# Patient Record
Sex: MALE | Race: WHITE | NOT HISPANIC OR LATINO | Employment: OTHER | URBAN - METROPOLITAN AREA
[De-identification: names, ages, dates, MRNs, and addresses within clinical notes are randomized per-mention and may not be internally consistent; named-entity substitution may affect disease eponyms.]

---

## 2017-06-13 ENCOUNTER — APPOINTMENT (OUTPATIENT)
Dept: CT IMAGING | Facility: CLINIC | Age: 77
DRG: 189 | End: 2017-06-13
Attending: EMERGENCY MEDICINE
Payer: COMMERCIAL

## 2017-06-13 ENCOUNTER — HOSPITAL ENCOUNTER (INPATIENT)
Facility: CLINIC | Age: 77
LOS: 4 days | Discharge: HOME HEALTH | DRG: 189 | End: 2017-06-17
Attending: EMERGENCY MEDICINE | Admitting: INTERNAL MEDICINE
Payer: COMMERCIAL

## 2017-06-13 DIAGNOSIS — J18.9 PNEUMONIA OF LEFT UPPER LOBE DUE TO INFECTIOUS ORGANISM: ICD-10-CM

## 2017-06-13 DIAGNOSIS — J44.1 COPD EXACERBATION (H): Primary | ICD-10-CM

## 2017-06-13 LAB
ANION GAP SERPL CALCULATED.3IONS-SCNC: 8 MMOL/L (ref 3–14)
BASE DEFICIT BLDA-SCNC: 0.6 MMOL/L
BASOPHILS # BLD AUTO: 0 10E9/L (ref 0–0.2)
BASOPHILS NFR BLD AUTO: 0.1 %
BUN SERPL-MCNC: 15 MG/DL (ref 7–30)
CALCIUM SERPL-MCNC: 8.9 MG/DL (ref 8.5–10.1)
CHLORIDE SERPL-SCNC: 100 MMOL/L (ref 94–109)
CO2 SERPL-SCNC: 29 MMOL/L (ref 20–32)
CREAT SERPL-MCNC: 0.82 MG/DL (ref 0.66–1.25)
DIFFERENTIAL METHOD BLD: ABNORMAL
EOSINOPHIL # BLD AUTO: 0.3 10E9/L (ref 0–0.7)
EOSINOPHIL NFR BLD AUTO: 3.2 %
ERYTHROCYTE [DISTWIDTH] IN BLOOD BY AUTOMATED COUNT: 14.6 % (ref 10–15)
GFR SERPL CREATININE-BSD FRML MDRD: ABNORMAL ML/MIN/1.7M2
GLUCOSE SERPL-MCNC: 129 MG/DL (ref 70–99)
HCO3 BLD-SCNC: 26 MMOL/L (ref 21–28)
HCT VFR BLD AUTO: 44.2 % (ref 40–53)
HGB BLD-MCNC: 14.2 G/DL (ref 13.3–17.7)
IMM GRANULOCYTES # BLD: 0 10E9/L (ref 0–0.4)
IMM GRANULOCYTES NFR BLD: 0.4 %
LACTATE BLD-SCNC: 1.8 MMOL/L (ref 0.7–2.1)
LYMPHOCYTES # BLD AUTO: 2.1 10E9/L (ref 0.8–5.3)
LYMPHOCYTES NFR BLD AUTO: 21 %
MCH RBC QN AUTO: 30.7 PG (ref 26.5–33)
MCHC RBC AUTO-ENTMCNC: 32.1 G/DL (ref 31.5–36.5)
MCV RBC AUTO: 96 FL (ref 78–100)
MONOCYTES # BLD AUTO: 1.4 10E9/L (ref 0–1.3)
MONOCYTES NFR BLD AUTO: 14.3 %
NEUTROPHILS # BLD AUTO: 6.1 10E9/L (ref 1.6–8.3)
NEUTROPHILS NFR BLD AUTO: 61 %
NRBC # BLD AUTO: 0 10*3/UL
NRBC BLD AUTO-RTO: 0 /100
OXYHGB MFR BLD: 96 % (ref 92–100)
PCO2 BLD: 49 MM HG (ref 35–45)
PH BLD: 7.33 PH (ref 7.35–7.45)
PLATELET # BLD AUTO: 159 10E9/L (ref 150–450)
PO2 BLD: 91 MM HG (ref 80–105)
POTASSIUM SERPL-SCNC: 4 MMOL/L (ref 3.4–5.3)
RBC # BLD AUTO: 4.63 10E12/L (ref 4.4–5.9)
SODIUM SERPL-SCNC: 137 MMOL/L (ref 133–144)
WBC # BLD AUTO: 10 10E9/L (ref 4–11)

## 2017-06-13 PROCEDURE — 25000125 ZZHC RX 250

## 2017-06-13 PROCEDURE — 82805 BLOOD GASES W/O2 SATURATION: CPT | Performed by: INTERNAL MEDICINE

## 2017-06-13 PROCEDURE — 36600 WITHDRAWAL OF ARTERIAL BLOOD: CPT

## 2017-06-13 PROCEDURE — 96365 THER/PROPH/DIAG IV INF INIT: CPT

## 2017-06-13 PROCEDURE — 25000125 ZZHC RX 250: Performed by: INTERNAL MEDICINE

## 2017-06-13 PROCEDURE — 80048 BASIC METABOLIC PNL TOTAL CA: CPT | Performed by: EMERGENCY MEDICINE

## 2017-06-13 PROCEDURE — 93005 ELECTROCARDIOGRAM TRACING: CPT

## 2017-06-13 PROCEDURE — 96375 TX/PRO/DX INJ NEW DRUG ADDON: CPT

## 2017-06-13 PROCEDURE — 83605 ASSAY OF LACTIC ACID: CPT | Performed by: EMERGENCY MEDICINE

## 2017-06-13 PROCEDURE — 99285 EMERGENCY DEPT VISIT HI MDM: CPT | Mod: 25

## 2017-06-13 PROCEDURE — 99223 1ST HOSP IP/OBS HIGH 75: CPT | Mod: AI | Performed by: INTERNAL MEDICINE

## 2017-06-13 PROCEDURE — 94640 AIRWAY INHALATION TREATMENT: CPT | Mod: 76

## 2017-06-13 PROCEDURE — 85025 COMPLETE CBC W/AUTO DIFF WBC: CPT | Performed by: EMERGENCY MEDICINE

## 2017-06-13 PROCEDURE — 40000275 ZZH STATISTIC RCP TIME EA 10 MIN

## 2017-06-13 PROCEDURE — 25000125 ZZHC RX 250: Performed by: EMERGENCY MEDICINE

## 2017-06-13 PROCEDURE — 25000128 H RX IP 250 OP 636: Performed by: EMERGENCY MEDICINE

## 2017-06-13 PROCEDURE — 25000128 H RX IP 250 OP 636: Performed by: INTERNAL MEDICINE

## 2017-06-13 PROCEDURE — 25000132 ZZH RX MED GY IP 250 OP 250 PS 637: Performed by: INTERNAL MEDICINE

## 2017-06-13 PROCEDURE — 12000000 ZZH R&B MED SURG/OB

## 2017-06-13 PROCEDURE — 99207 ZZC APP CREDIT; MD BILLING SHARED VISIT: CPT | Performed by: INTERNAL MEDICINE

## 2017-06-13 PROCEDURE — 74176 CT ABD & PELVIS W/O CONTRAST: CPT

## 2017-06-13 PROCEDURE — 87040 BLOOD CULTURE FOR BACTERIA: CPT | Performed by: EMERGENCY MEDICINE

## 2017-06-13 PROCEDURE — 94640 AIRWAY INHALATION TREATMENT: CPT

## 2017-06-13 RX ORDER — IPRATROPIUM BROMIDE AND ALBUTEROL SULFATE 2.5; .5 MG/3ML; MG/3ML
SOLUTION RESPIRATORY (INHALATION)
Status: COMPLETED
Start: 2017-06-13 | End: 2017-06-13

## 2017-06-13 RX ORDER — HYDROCODONE BITARTRATE AND ACETAMINOPHEN 5; 325 MG/1; MG/1
2 TABLET ORAL EVERY 4 HOURS PRN
Status: DISCONTINUED | OUTPATIENT
Start: 2017-06-13 | End: 2017-06-17 | Stop reason: HOSPADM

## 2017-06-13 RX ORDER — HYDROMORPHONE HYDROCHLORIDE 1 MG/ML
.3-.5 INJECTION, SOLUTION INTRAMUSCULAR; INTRAVENOUS; SUBCUTANEOUS
Status: DISCONTINUED | OUTPATIENT
Start: 2017-06-13 | End: 2017-06-17 | Stop reason: HOSPADM

## 2017-06-13 RX ORDER — CEFTRIAXONE 2 G/1
2 INJECTION, POWDER, FOR SOLUTION INTRAMUSCULAR; INTRAVENOUS EVERY 24 HOURS
Status: DISCONTINUED | OUTPATIENT
Start: 2017-06-14 | End: 2017-06-17 | Stop reason: HOSPADM

## 2017-06-13 RX ORDER — IPRATROPIUM BROMIDE AND ALBUTEROL SULFATE 2.5; .5 MG/3ML; MG/3ML
3 SOLUTION RESPIRATORY (INHALATION)
Status: DISCONTINUED | OUTPATIENT
Start: 2017-06-13 | End: 2017-06-17 | Stop reason: HOSPADM

## 2017-06-13 RX ORDER — NALOXONE HYDROCHLORIDE 0.4 MG/ML
.1-.4 INJECTION, SOLUTION INTRAMUSCULAR; INTRAVENOUS; SUBCUTANEOUS
Status: DISCONTINUED | OUTPATIENT
Start: 2017-06-13 | End: 2017-06-17 | Stop reason: HOSPADM

## 2017-06-13 RX ORDER — ALBUTEROL SULFATE 0.83 MG/ML
2.5 SOLUTION RESPIRATORY (INHALATION)
Status: DISCONTINUED | OUTPATIENT
Start: 2017-06-13 | End: 2017-06-17 | Stop reason: HOSPADM

## 2017-06-13 RX ORDER — AZITHROMYCIN 250 MG/1
250 TABLET, FILM COATED ORAL EVERY 24 HOURS
Status: COMPLETED | OUTPATIENT
Start: 2017-06-14 | End: 2017-06-17

## 2017-06-13 RX ORDER — IPRATROPIUM BROMIDE AND ALBUTEROL SULFATE 2.5; .5 MG/3ML; MG/3ML
3 SOLUTION RESPIRATORY (INHALATION) ONCE
Status: COMPLETED | OUTPATIENT
Start: 2017-06-13 | End: 2017-06-13

## 2017-06-13 RX ORDER — PAROXETINE 10 MG/1
10 TABLET, FILM COATED ORAL DAILY
Status: DISCONTINUED | OUTPATIENT
Start: 2017-06-13 | End: 2017-06-17 | Stop reason: HOSPADM

## 2017-06-13 RX ORDER — BISACODYL 10 MG
10 SUPPOSITORY, RECTAL RECTAL DAILY PRN
Status: DISCONTINUED | OUTPATIENT
Start: 2017-06-13 | End: 2017-06-17 | Stop reason: HOSPADM

## 2017-06-13 RX ORDER — TAMSULOSIN HYDROCHLORIDE 0.4 MG/1
0.4 CAPSULE ORAL 2 TIMES DAILY
Status: DISCONTINUED | OUTPATIENT
Start: 2017-06-13 | End: 2017-06-17 | Stop reason: HOSPADM

## 2017-06-13 RX ORDER — CEFTRIAXONE 2 G/1
2 INJECTION, POWDER, FOR SOLUTION INTRAMUSCULAR; INTRAVENOUS ONCE
Status: COMPLETED | OUTPATIENT
Start: 2017-06-13 | End: 2017-06-13

## 2017-06-13 RX ORDER — AMOXICILLIN 250 MG
1-2 CAPSULE ORAL 2 TIMES DAILY PRN
Status: DISCONTINUED | OUTPATIENT
Start: 2017-06-13 | End: 2017-06-17 | Stop reason: HOSPADM

## 2017-06-13 RX ORDER — METHYLPREDNISOLONE SODIUM SUCCINATE 125 MG/2ML
125 INJECTION, POWDER, LYOPHILIZED, FOR SOLUTION INTRAMUSCULAR; INTRAVENOUS ONCE
Status: COMPLETED | OUTPATIENT
Start: 2017-06-13 | End: 2017-06-13

## 2017-06-13 RX ORDER — ASPIRIN 81 MG/1
81 TABLET, CHEWABLE ORAL DAILY
Status: DISCONTINUED | OUTPATIENT
Start: 2017-06-13 | End: 2017-06-17 | Stop reason: HOSPADM

## 2017-06-13 RX ORDER — ERYTHROMYCIN 5 MG/G
OINTMENT OPHTHALMIC EVERY 8 HOURS SCHEDULED
Status: DISCONTINUED | OUTPATIENT
Start: 2017-06-13 | End: 2017-06-17 | Stop reason: HOSPADM

## 2017-06-13 RX ORDER — METOPROLOL TARTRATE 25 MG/1
25 TABLET, FILM COATED ORAL DAILY
Status: DISCONTINUED | OUTPATIENT
Start: 2017-06-13 | End: 2017-06-17 | Stop reason: HOSPADM

## 2017-06-13 RX ORDER — PREDNISONE 20 MG/1
60 TABLET ORAL DAILY
Status: DISCONTINUED | OUTPATIENT
Start: 2017-06-14 | End: 2017-06-16

## 2017-06-13 RX ORDER — ALLOPURINOL 300 MG/1
300 TABLET ORAL DAILY
Status: DISCONTINUED | OUTPATIENT
Start: 2017-06-13 | End: 2017-06-17 | Stop reason: HOSPADM

## 2017-06-13 RX ORDER — HYDROCODONE BITARTRATE AND ACETAMINOPHEN 5; 325 MG/1; MG/1
2 TABLET ORAL EVERY 4 HOURS PRN
Status: ON HOLD | COMMUNITY
End: 2017-06-24

## 2017-06-13 RX ORDER — SODIUM CHLORIDE 9 MG/ML
INJECTION, SOLUTION INTRAVENOUS CONTINUOUS
Status: DISCONTINUED | OUTPATIENT
Start: 2017-06-13 | End: 2017-06-15

## 2017-06-13 RX ADMIN — IPRATROPIUM BROMIDE AND ALBUTEROL SULFATE 3 ML: .5; 3 SOLUTION RESPIRATORY (INHALATION) at 16:02

## 2017-06-13 RX ADMIN — NALOXONE HYDROCHLORIDE 0.2 MG: 0.4 INJECTION, SOLUTION INTRAMUSCULAR; INTRAVENOUS; SUBCUTANEOUS at 13:17

## 2017-06-13 RX ADMIN — ERYTHROMYCIN: 5 OINTMENT OPHTHALMIC at 09:25

## 2017-06-13 RX ADMIN — IPRATROPIUM BROMIDE AND ALBUTEROL SULFATE 3 ML: .5; 3 SOLUTION RESPIRATORY (INHALATION) at 11:19

## 2017-06-13 RX ADMIN — IPRATROPIUM BROMIDE AND ALBUTEROL SULFATE 3 ML: 2.5; .5 SOLUTION RESPIRATORY (INHALATION) at 06:18

## 2017-06-13 RX ADMIN — ALLOPURINOL 300 MG: 300 TABLET ORAL at 09:31

## 2017-06-13 RX ADMIN — SODIUM CHLORIDE 1000 ML: 9 INJECTION, SOLUTION INTRAVENOUS at 04:10

## 2017-06-13 RX ADMIN — ASPIRIN 81 MG 81 MG: 81 TABLET ORAL at 09:31

## 2017-06-13 RX ADMIN — TAMSULOSIN HYDROCHLORIDE 0.4 MG: 0.4 CAPSULE ORAL at 09:31

## 2017-06-13 RX ADMIN — SODIUM CHLORIDE: 9 INJECTION, SOLUTION INTRAVENOUS at 08:37

## 2017-06-13 RX ADMIN — TAMSULOSIN HYDROCHLORIDE 0.4 MG: 0.4 CAPSULE ORAL at 21:39

## 2017-06-13 RX ADMIN — RANITIDINE 150 MG: 150 TABLET ORAL at 21:39

## 2017-06-13 RX ADMIN — HYDROCODONE BITARTRATE AND ACETAMINOPHEN 2 TABLET: 5; 325 TABLET ORAL at 08:38

## 2017-06-13 RX ADMIN — IPRATROPIUM BROMIDE AND ALBUTEROL SULFATE 3 ML: .5; 3 SOLUTION RESPIRATORY (INHALATION) at 19:15

## 2017-06-13 RX ADMIN — ERYTHROMYCIN: 5 OINTMENT OPHTHALMIC at 15:46

## 2017-06-13 RX ADMIN — METHYLPREDNISOLONE SODIUM SUCCINATE 125 MG: 125 INJECTION, POWDER, FOR SOLUTION INTRAMUSCULAR; INTRAVENOUS at 06:17

## 2017-06-13 RX ADMIN — RANITIDINE 150 MG: 150 TABLET ORAL at 09:31

## 2017-06-13 RX ADMIN — IPRATROPIUM BROMIDE AND ALBUTEROL SULFATE 3 ML: .5; 3 SOLUTION RESPIRATORY (INHALATION) at 07:57

## 2017-06-13 RX ADMIN — METOPROLOL TARTRATE 25 MG: 25 TABLET ORAL at 09:30

## 2017-06-13 RX ADMIN — AZITHROMYCIN MONOHYDRATE 500 MG: 500 INJECTION, POWDER, LYOPHILIZED, FOR SOLUTION INTRAVENOUS at 06:37

## 2017-06-13 RX ADMIN — IPRATROPIUM BROMIDE AND ALBUTEROL SULFATE 3 ML: .5; 3 SOLUTION RESPIRATORY (INHALATION) at 06:18

## 2017-06-13 RX ADMIN — CEFTRIAXONE 2 G: 2 INJECTION, POWDER, FOR SOLUTION INTRAMUSCULAR; INTRAVENOUS at 06:17

## 2017-06-13 RX ADMIN — IPRATROPIUM BROMIDE AND ALBUTEROL SULFATE 3 ML: .5; 3 SOLUTION RESPIRATORY (INHALATION) at 04:20

## 2017-06-13 ASSESSMENT — ENCOUNTER SYMPTOMS
NAUSEA: 0
FEVER: 1
SHORTNESS OF BREATH: 1
ABDOMINAL PAIN: 0
DIZZINESS: 0
DIARRHEA: 0
VOMITING: 0
MYALGIAS: 1
COUGH: 0
LIGHT-HEADEDNESS: 0

## 2017-06-13 NOTE — IP AVS SNAPSHOT
MRN:[id number]                      After Visit Summary   6/13/2017    Ron Gilmore    MRN: [id number]           Thank you!     Thank you for choosing [facility] for your care. Our goal is always to provide you with excellent care. Hearing back from our patients is one way we can continue to improve our services. Please take a few minutes to complete the written survey that you may receive in the mail after you visit with us. Thank you!        Patient Information     Date Of Birth          1940        Designated Caregiver       Most Recent Value    Caregiver    Will someone help with your care after discharge? yes    Name of designated caregiver Yuli     Phone number of caregiver [phone]    Caregiver address [address], [place] [address]      About your hospital stay     You were admitted on:  June 13, 2017 You last received care in the:  [facility] [address] [facility]    You were discharged on:  June 17, 2017        Reason for your hospital stay       COPD exacerbation/PNA.                  Who to Call     For medical emergencies, please call 911.  For non-urgent questions about your medical care, please call your primary care provider or clinic, [phone]          Attending Provider     Provider Specialty    Jailyn Abreu MD Emergency Medicine    Cho, Wali AMEZQUITA MD Internal Medicine       Primary Care Provider Office Phone # Fax #    Augustin Thomas [phone]      After Care Instructions     Activity       Your activity upon discharge: activity as tolerated            Diet       Follow this diet upon discharge: High consistent carbohydrate (5005-6083 sheldon / 4-7 CHO units per meal)            Oxygen Adult       [facility] Oxygen Order 2 liter(s) by nasal cannula continuously with use of portable tank. Expected treatment length is 1 month. Test on conserving device as applicable.    Patients who qualify for home O2 coverage under the CMS guidelines  require ABG tests or O2 sat readings obtained closest to, but no earlier than 2 days prior to the discharge, as evidence of the need for home oxygen therapy. Testing must be performed while patient is in the chronic stable state. See notes for O2 sats.    I certify that this patient, Ron Gilmore has been under my care and that I, or a nurse practitioner or physician's assistant working with me, had a face-to-face encounter that meets the face-to-face encounter requirements with this patient on 6/17/2017. The patient, Ron Gilmore was evaluated or treated in whole, or in part, for the following medical condition, which necessitates the use of the ordered oxygen. Treatment Diagnosis: COPD/Pneumonia      Attending Provider: Wali Ibrahim MD  Physician signature: See electronic signature associated with these discharge orders  Date of Order: June 17, 2017                  Follow-up Appointments     Follow-up and recommended labs and tests        Follow up with primary care provider, Augustin Thomas, within 7 days for hospital follow- up.                  Additional Services     Home Care OT Referral for Hospital Discharge       OT to eval and treat    Your provider has ordered home care - occupational therapy. If you have not been contacted within 2 days of your discharge please call the department phone number listed on the top of this document.            Home Care PT Referral for Hospital Discharge       PT to eval and treat    Your provider has ordered home care - physical therapy. If you have not been contacted within 2 days of your discharge please call the department phone number listed on the top of this document.            Home care nursing referral       RN skilled nursing visit. RN to assess respiratory and cardiac status.  Home health aide to assist with copd management, home care needs.     Your provider has ordered home care nursing services. If you have not been contacted within 2 days of your  "discharge please call the inpatient department phone number at [phone] .                  Further instructions from your care team       [facility] [phone], Fax: [phone]    [facility] [place] [phone], Fax: [phone]    You are scheduled for a post hospital follow up with Dr. Thomas on June 22nd at 2:45 p.m. Located at:  [facility] [address], [place]     Oxygen Provider:  Arranged through [facility], contact number [phone].  If you have any questions or concerns please call the oxygen company directly.        Pending Results     Date and Time Order Name Status Description    6/13/2017 0741 Sputum Culture Aerobic Bacterial In process     6/13/2017 0416 Blood culture Preliminary     6/13/2017 0416 Blood culture Preliminary             Statement of Approval     Ordered          06/17/17 1038  I have reviewed and agree with all the recommendations and orders detailed in this document.  EFFECTIVE NOW     Approved and electronically signed by:  Wlai Ibrahim MD             Admission Information     Date & Time Provider Department Dept. Phone    6/13/2017 Wali Ibrahim MD [place] [address] [facility] [phone]      Your Vitals Were     Blood Pressure Pulse Temperature Respirations Height Weight    117/77 (BP Location: Left arm) 74 96.1  F (35.6  C) (Axillary) 20 1.829 m (6') 140 kg (308 lb 10.3 oz)    Pulse Oximetry BMI (Body Mass Index)                94% 41.86 kg/m2          [employer] Information     [employer] lets you send messages to your doctor, view your test results, renew your prescriptions, schedule appointments and more. To sign up, go to [email or web address] . Click on \"Log in\" on the left side of the screen, which will take you to the Welcome page. Then click on \"Sign up Now\" on the right side of the page.     You will be asked to enter the access code listed below, as well as some personal information. " Please follow the directions to create your username and password.     Your access code is: [email or web address]  Expires: 9/15/2017 10:52 AM     Your access code will  in 90 days. If you need help or a new code, please call your [facility] clinic or [phone].        Care EveryWhere ID     This is your Care EveryWhere ID. This could be used by other organizations to access your [facility] medical records  [id number]           Review of your medicines      START taking        Dose / Directions    albuterol (2.5 MG/3ML) 0.083% neb solution   Used for:  COPD exacerbation (H)        Dose:  2.5 mg   Take 1 vial (2.5 mg) by nebulization every 2 hours as needed for shortness of breath / dyspnea or other (dyspnea)   Quantity:  360 mL   Refills:  0       ipratropium - albuterol 0.5 mg/2.5 mg/3 mL 0.5-2.5 (3) MG/3ML neb solution   Commonly known as:  DUONEB   Used for:  COPD exacerbation (H)        Dose:  3 mL   Take 1 vial (3 mLs) by nebulization 4 times daily   Quantity:  120 mL   Refills:  0       levofloxacin 750 MG tablet   Commonly known as:  LEVAQUIN   Used for:  Pneumonia of left upper lobe due to infectious organism        Dose:  750 mg   Take 1 tablet (750 mg) by mouth daily for 5 days   Quantity:  5 tablet   Refills:  0       order for DME   Used for:  COPD exacerbation (H)        Equipment being ordered: Other: Home nebulizer Treatment Diagnosis: COPD/Pneumonia   Quantity:  1 Device   Refills:  0       predniSONE 10 MG tablet   Commonly known as:  DELTASONE   Used for:  COPD exacerbation (H)        4 tabs daily for 3 days, then 3 tabs daily for 3 days, then 2 tabs daily for 3 days, then 1 tab daily for 3 days, then stop   Quantity:  30 tablet   Refills:  0         CONTINUE these medicines which have NOT CHANGED        Dose / Directions    ALLOPURINOL PO        Dose:  300 mg   Take 300 mg by mouth daily   Refills:  0       BABY ASPIRIN 81 MG chewable tablet   Generic drug:  aspirin        1 TABLET DAILY   Refills:   0       FLOMAX 0.4 MG capsule   Generic drug:  tamsulosin        Dose:  0.4 mg   Take 0.4 mg by mouth 2 times daily   Refills:  0       HYDROcodone-acetaminophen 5-325 MG per tablet   Commonly known as:  NORCO        Dose:  2 tablet   Take 2 tablets by mouth every 4 hours as needed for moderate to severe pain   Refills:  0       metoprolol 25 MG tablet   Commonly known as:  LOPRESSOR        Dose:  25 mg   Take 25 mg by mouth daily   Quantity:  180 tablet   Refills:  3       PAXIL PO        Dose:  10 mg   Take 10 mg by mouth daily   Refills:  0            Where to get your medicines      These medications were sent to [facility] - [address]  [address], [place] [address]     Phone:  [phone]     albuterol (2.5 MG/3ML) 0.083% neb solution    ipratropium - albuterol 0.5 mg/2.5 mg/3 mL 0.5-2.5 (3) MG/3ML neb solution    levofloxacin 750 MG tablet    predniSONE 10 MG tablet         Some of these will need a paper prescription and others can be bought over the counter. Ask your nurse if you have questions.     Bring a paper prescription for each of these medications     order for DME                Protect others around you: Learn how to safely use, store and throw away your medicines at www.disposemymeds.org.             Medication List: This is a list of all your medications and when to take them. Check marks below indicate your daily home schedule. Keep this list as a reference.      Medications           Morning Afternoon Evening Bedtime As Needed    albuterol (2.5 MG/3ML) 0.083% neb solution   Take 1 vial (2.5 mg) by nebulization every 2 hours as needed for shortness of breath / dyspnea or other (dyspnea)   Last time this was given:  2.5 mg on 6/17/2017  3:47 AM                                ALLOPURINOL PO   Take 300 mg by mouth daily   Last time this was given:  300 mg on 6/17/2017  8:03 AM                                BABY ASPIRIN 81 MG chewable tablet   1  TABLET DAILY   Last time this was given:  81 mg on 6/17/2017  8:03 AM   Generic drug:  aspirin                                FLOMAX 0.4 MG capsule   Take 0.4 mg by mouth 2 times daily   Last time this was given:  0.4 mg on 6/17/2017  8:03 AM   Generic drug:  tamsulosin                                HYDROcodone-acetaminophen 5-325 MG per tablet   Commonly known as:  NORCO   Take 2 tablets by mouth every 4 hours as needed for moderate to severe pain   Last time this was given:  2 tablets on 6/13/2017  8:38 AM                                ipratropium - albuterol 0.5 mg/2.5 mg/3 mL 0.5-2.5 (3) MG/3ML neb solution   Commonly known as:  DUONEB   Take 1 vial (3 mLs) by nebulization 4 times daily   Last time this was given:  3 mLs on 6/17/2017 11:10 AM                                levofloxacin 750 MG tablet   Commonly known as:  LEVAQUIN   Take 1 tablet (750 mg) by mouth daily for 5 days                                metoprolol 25 MG tablet   Commonly known as:  LOPRESSOR   Take 25 mg by mouth daily   Last time this was given:  25 mg on 6/17/2017  8:03 AM                                order for DME   Equipment being ordered: Other: Home nebulizer Treatment Diagnosis: COPD/Pneumonia                                PAXIL PO   Take 10 mg by mouth daily   Last time this was given:  10 mg on 6/17/2017  8:03 AM                                predniSONE 10 MG tablet   Commonly known as:  DELTASONE   4 tabs daily for 3 days, then 3 tabs daily for 3 days, then 2 tabs daily for 3 days, then 1 tab daily for 3 days, then stop   Last time this was given:  40 mg on 6/17/2017  8:03 AM

## 2017-06-13 NOTE — H&P
PRIMARY CARE PHYSICIAN:  None given.        ADMISSION DATE:  06/13/2017       CHIEF COMPLAINT:  Shortness of breath, cough.      HISTORY OF PRESENT ILLNESS:  Ron Gilmore is a pleasant 76-year-old gentleman with a known history of a prior stroke and hypertension who tells me that he underwent an eye surgery for his eyelid on Friday 06/09 and during that procedure per his report, he was told that he had some issues with low oxygenation.  Subsequent to that, he was doing okay over the weekend, but he started to have fevers and became progressively more short of breath fairly suddenly in the last 24 hours.  He therefore sought medical attention and was seen by Dr. Abreu in the emergency department and ultimately was found to have a pneumonia on imaging.  Dr. Abreu contacted me regarding this and we both agree that Mr. Gilmore needs to be hospitalized for IV antibiotics and further treatment of his acute respiratory failure and pneumonia.  On my questioning, he denies any other major complaints.  He has not been coughing up any blood.  He has been coughing up any phlegm, vomiting blood, having bloody stools or bloody urine.  He denies any personal history of kidney disease or liver disease.  He acknowledges a prior history of smoking but quit in 1989.  Aside from his prior stroke he considers himself a very healthy person.      PAST MEDICAL HISTORY:   1.  Prior CVA.   2.  Spinal stenosis.   3.  Obesity.     4.  Kidney stones.   5.  Hypertension.   6.  Hyperlipidemia.   7.  Type 2 diabetes.   8.  Depression.   9.  Chronic obstructive pulmonary disease.   10.  Cholelithiasis.   11.  History of cataract.      PAST SURGICAL HISTORY:   1.  Status post tonsillectomy.   2.  Status post laminectomy.     3.  Status post knee surgery.    4.  Status post cystoscopy.   5.  Status post cholecystectomy.   6.  Status post cataract surgery.     7.  Status post arthroscopy.   8.  Status post open appendectomy.     9.  Status post  eyelid surgery.      FAMILY HISTORY:  Significant for coronary artery disease.      SOCIAL HISTORY:  Quit smoking in 1989.  He does not drink alcohol.      ALLERGIES:  None known to date.      PRIOR TO ADMISSION MEDICATIONS:   1.  Norco 5/325 mg 1-2 tabs every 4 hours p.r.n. for moderate to severe pain.   2.  Allopurinol 300 mg p.o. daily.   3.  Paxil 10 mg p.o. daily.   4.  Flomax 0.4 mg p.o. twice daily.   5.  Metoprolol 25 mg p.o. daily.   6.  Baby aspirin 81 mg p.o. daily.      REVIEW OF SYSTEMS:  A 10-point system review was performed and was negative with the exception of those complaints noted in the HPI.      PHYSICAL EXAMINATION:   VITAL SIGNS:  Temperature is 100.2, heart rate 113, blood pressure 127/81, respiratory rate 22.   GENERAL:  This is a pleasant man who looks a little older than his stated age.  He is lying in a hospital bed.   HEENT:  Reveals no facial asymmetry other than the recent evidence of eyelid surgery.  His eyelid is swollen due to the surgery.  His left pupil is reactive to light.  Unable to do a proper pupil exam on his right due to the recent surgery.  Oropharynx reveals somewhat dry appearing mucous membranes.   RESPIRATORY:  Diminished breath sounds at the bases with occasional wheezes present.   HEART:  Distant heart sounds with tachycardia noted.  S1, S2 are identified.  No murmurs.   ABDOMEN:  Soft, obese, nontender, nondistended.  Bowel sounds are heard.   EXTREMITIES:  Lower extremity exam is significant for trace pedal edema bilaterally.  No calf tenderness is present.   NEUROLOGIC:  No gross focal deficits at this time.  A proper cranial nerve exam cannot be completed due to his recent eyelid surgery, however.      LABORATORY FINDINGS:  Sodium is 137, potassium 4.0, chloride 100, CO2 of 29, BUN is 15, creatinine 0.82, calcium 8.9, anion gap is 8, lactic acid 1.8, glucose is 129.  White blood cell count 10, hemoglobin 14.2, platelets of 159.  Blood cultures were obtained.   There was a CT scan of the chest, abdomen and pelvis without contrast that was performed.  I personally reviewed the images of this CT scan and I am in agreement with the radiologist's report, which suggests probable right upper lobe pneumonia as well as COPD and coronary artery calcifications.  He also has a single right renal stone and evidence of colonic diverticulosis without diverticulitis.      EKG:  I personally reviewed the EKG which shows a sinus tachycardia with evidence of a right bundle branch block.      ASSESSMENT AND PLAN:  This is a 76-year-old gentleman presenting with acute respiratory failure secondary to right upper lobe pneumonia and possible chronic obstructive pulmonary disease exacerbation.   1.  Acute respiratory failure.  He has a right upper lobe infiltrate for which he has been initiated on IV ceftriaxone and IV azithromycin.  We will continue this and provide aggressive respiratory support with oxygen and nebulizers.  He was also given Solu-Medrol in the emergency department and will convert that to prednisone with plans to taper as tolerated.   2.  Right upper lobe pneumonia as noted above, he will be treated with azithromycin and ceftriaxone.     3.  Chronic obstructive pulmonary disease with likely exacerbation.  He has been started on Solu-Medrol, will convert this to prednisone and provide DuoNebs and albuterol nebs.   4.  Recent eye surgery.  Will continue him on his erythromycin eye drops.   5.  Benign prostatic hypertrophy.  He will continue on his Flomax.   6.  History of stroke.  I will continue his metoprolol and aspirin.   7.  Type 2 diabetes.  This is diet-controlled.   8.  Deep venous thrombosis prophylaxis will be mechanical.    9.  CODE STATUS:  I discussed code status with him and he would like to be full code.   10.  Morbid obesity, BMI is greater than 40 and he may benefit from weight loss.      DISPOSITION:  I anticipate at least a 2 midnights' stay.  As such, he will  be admitted as an inpatient.         YARON GUADALUPE MD             D: 2017 07:32   T: 2017 08:17   MT: [email or web address]      Name:     SHERI THORPE   MRN:      [id number]        Account:      [id number]   :      1940           Admitted:     [id number]      Document: [id number]

## 2017-06-13 NOTE — PROVIDER NOTIFICATION
MD Notification    Notified Person:  MD    Notified Persons Name: Dr Ibrahim     Notification Date/Time: 6/13/2017 1430    Notification Interaction:  Physician came to assess pt    Purpose of Notification: change in mental status - increased confusion/lethergy     Orders Received: Narcan 0.2mg IV, blood gases, continue to monitor.    Comments: If O2 requirements increase, consider bipap. Continue to monitor confusion.

## 2017-06-13 NOTE — ED NOTES
[facility]  ED Nurse Handoff Report    ED Chief complaint: Shortness of Breath and Fever      ED Diagnosis:   Final diagnoses:   Pneumonia of left upper lobe due to infectious organism       Code Status: Full Code    Allergies: No Known Allergies    Activity level - Baseline/Home:  Stand with Assist of 2-pt uses [employer] scooter at home.    Activity Level - Current:   Total Care     Needed?: No    Isolation: No  Infection: Not Applicable    Bariatric?: No    Vital Signs:   Vitals:    06/13/17 0351 06/13/17 0434 06/13/17 0435   BP: 127/81 124/74    Pulse: 113     Resp: 22  20   Temp: 100.2  F (37.9  C)     TempSrc: Oral     SpO2:   94%   Weight: 136.1 kg (300 lb)     Height: 1.829 m (6')         Cardiac Rhythm: ,   Cardiac  Cardiac Rhythm: Sinus tachycardia    Pain level: 0-10 Pain Scale: 6    Is this patient confused?: No    Patient Report: Initial Complaint: pt comes in tonight via EMS after wife called concerned about pt breathing.  Pt had eye surgery last Thursday.  Pt had general anesthesia for this procedure.  Pt has smoking history but does not currently smoke.  Pt does not use oxygen or nebs or inhalers at home either.  Pt has had a stroke in past and has left arm residual weakness.  Pt uses brief for occasional incontinence.  Pt fell two days ago and has bruise on right knee-no other injuries but EMS was called to get pt off the floor.    Focused Assessment: pt has been expiratory wheezing on left upper chest > right.  Pt c/o stiffness and soreness in legs from fall.    Tests Performed: Labs, CT  Abnormal Results: Pneumonia right upper lobes.    Treatments provided: meds, nebsx2, fluid bolus    Family Comments: wife at home-will come today, step daughter was here but left.    OBS brochure/video discussed/provided to patient: N/A    ED Medications:   Medications   azithromycin (ZITHROMAX) 500 mg in NaCl 0.9 % 250 mL intermittent infusion (500 mg Intravenous New Bag 6/13/17 0637)    ipratropium - albuterol 0.5 mg/2.5 mg/3 mL (DUONEB) neb solution 3 mL (3 mLs Nebulization Given 6/13/17 0420)   0.9% sodium chloride BOLUS (1,000 mLs Intravenous New Bag 6/13/17 0410)   methylPREDNISolone sodium succinate (solu-MEDROL) injection 125 mg (125 mg Intravenous Given 6/13/17 0617)   cefTRIAXone (ROCEPHIN) 2 g vial to attach to  ml bag for ADULTS or NS 50 ml bag for PEDS (0 g Intravenous Stopped 6/13/17 0638)   ipratropium - albuterol 0.5 mg/2.5 mg/3 mL (DUONEB) neb solution 3 mL (3 mLs Nebulization Given 6/13/17 0618)       Drips infusing?:  Yes-Zithromax      ED NURSE PHONE NUMBER: [phone]

## 2017-06-13 NOTE — ED NOTES
Bed: ED19  Expected date: 6/13/17  Expected time: 3:30 AM  Means of arrival: Ambulance  Comments:  HE Amb. 76M fever

## 2017-06-13 NOTE — IP AVS SNAPSHOT
[address], [address]    [place] [address]    Phone:  [phone]                                       After Visit Summary   6/13/2017    Ron Gilmore    MRN: [id number]           After Visit Summary Signature Page     I have received my discharge instructions, and my questions have been answered. I have discussed any challenges I see with this plan with the nurse or doctor.    ..........................................................................................................................................  Patient/Patient Representative Signature      ..........................................................................................................................................  Patient Representative Print Name and Relationship to Patient    ..................................................               ................................................  Date                                            Time    ..........................................................................................................................................  Reviewed by Signature/Title    ...................................................              ..............................................  Date                                                            Time

## 2017-06-13 NOTE — ED PROVIDER NOTES
History     Chief Complaint:  Shortness of Breath and Fever    HPI   Ron Gilmore is a 76 year old male with a history of stroke, diabetes, COPD, hypertension and hyperlipidemia who presents with shortness of breath and fever. The patient ambulates using electric chair at baseline. Wife repots that the patient's oxygen saturations have been low for the past 2 weeks. He was seen by his primary 2 weeks ago for pre-op evaluation and reports his saturations were in the low 90s. He underwent eye surgery on Friday, 3 days ago, and wife reports they had trouble getting his saturations into the 90s. They tried having the patient sit up and take deep breaths. The patient fell in his home yesterday while trying to transfer into his chair. EMS came and helped him up from the fall yesterday but did not bring him in. The patient had a temperature of 100.2 last night and wife reports he seemed more short of breath this evening. She also states that the patient's mother thought the patient seemed more confused yesterday. Wife called 911 today and the patient was brought to the ED by EMS for evaluation. He was at 86% on room air and came up to 93% on 4L. The patient was given 1 neb by EMS. Temperature was 102 oral for EMS. On arrival to the ED, the patient reports he is not feeling more short of breath than normal. Wife reports he sounded crackly this evening.  He denies any new or worsening cough. He states he feels achy all over but denies any chest pain, vomiting, leg pain or swelling. The patient has never needed oxygen before. He has a remote smoking history.     PE/DVT RISK FACTORS:  Sex:    Male  Hormones:   No  Tobacco:   Former smoker, quit 20 years ago  Cancer:   No  Travel:   No  Surgery:   Eye surgery, no other surgery  Other immobilization: Sits in electric chair to ambulate   Personal history:  No  Family history:  No    Allergies:  No Known Allergies     Medications:    HYDROcodone-acetaminophen (NORCO) 5-325 MG  per tablet  ALLOPURINOL PO  PARoxetine HCl (PAXIL PO)  tamsulosin (FLOMAX) 0.4 MG 24 hr capsule  metoprolol (LOPRESSOR) 25 MG tablet  BABY ASPIRIN 81 MG OR CHEW    Past Medical History:    Cataract  Cholelithiasis  COPD  Depression  Diabetes mellitus  Hyperlipidemia  Hypertension  Kidney stone  Obesity  Spinal stenosis  Stroke     Past Surgical History:    Appendectomy  Arthroscopy shoulder  Cataracts  Cholecystectomy  Joint replacement  Knee surgery  Laminectomy lumbar   Tonsillectomy     Family History:    History reviewed. No pertinent family history.     Social History:  Smoking status: Former smoker, quit 20 years ago  Alcohol use: No  Presents to the ED with his wife  Marital Status:   [2]     Review of Systems   Constitutional: Positive for fever.   Respiratory: Positive for shortness of breath. Negative for cough.    Cardiovascular: Negative for chest pain and leg swelling.   Gastrointestinal: Negative for abdominal pain, diarrhea, nausea and vomiting.   Musculoskeletal: Positive for myalgias.   Neurological: Negative for dizziness and light-headedness.   10 point review of systems performed and is negative except as above and in HPI.      Physical Exam     Patient Vitals for the past 24 hrs:   BP Temp Temp src Pulse Heart Rate Resp SpO2 Height Weight   06/13/17 0435 - - - - 107 20 94 % - -   06/13/17 0434 124/74 - - - - - - - -   06/13/17 0351 127/81 100.2  F (37.9  C) Oral 113 - 22 - 1.829 m (6') 136.1 kg (300 lb)       Physical Exam  General: Resting on the gurney, appears somewhat uncomfortable  Head:  The scalp, face, and head appear normal  Eyes:  The right eye with ointment and some mattering secondary to recent procedure.   Mouth/Throat: Mucus membranes are moist  CV:  Regular rate    Normal S1 and S2  No pathological murmur   Resp:  Diminished breath sounds and wheezes throughout with coarseness heard on the left lower lung.     Non-labored, no retractions or accessory muscle use    No  coarseness  GI:  Abdomen is soft, no rigidity    No tenderness to palpation  MS:  Normal motor assessment of all extremities.    Good capillary refill noted.    No lower extremity edema.     Right great toe with some redness and bleeding around the nail  Neuro:   Speech is normal and fluent. No apparent deficit. Left sided weakness secondary to old stroke. No acute weakness.   Psych: Awake. Alert.  Normal affect.      Appropriate interactions.    Emergency Department Course   ECG (4:03:55):  Rate 104 bpm. NJ interval 168. QRS duration 128. QT/QTc 358/470. P-R-T axes 53 46 21. Sinus tachycardia with premature atrial complexes. Right bundle branch block. Abnormal ECG   Interpreted at 0404 by Jailyn Abreu MD.    Imaging:  Radiographic findings were communicated with the patient who voiced understanding of the findings.    CT-scan Chest/Abdomen/Pelvis w/o contrast:  1. Probable right upper lobe pneumonia.  2. Emphysema.  3. Coronary artery atherosclerotic calcifications.  4. No acute abdominal or pelvic abnormality.  5. Colonic diverticula without acute diverticulitis.  6. Single right renal stone. No ureteral stone or hydronephrosis.  Preliminary result per radiology.     Laboratory:  CBC: WNL (WBC 10.0, HGB 14.2, )   BMP: Glucose 129(H), o/w WNL (Creatinine 0.82)  Lactic acid: 1.8    Blood cultures: in process    Interventions:  0410: NS 1L IV Bolus  0420: Duoneb 3 mL  0618: Duoneb 3 mL  0637: Azithromycin 500 mg IV  0638: Rocephin 2 g IV    Emergency Department Course:  The patient arrived in the emergency department via EMS.  Past medical records, nursing notes, and vitals reviewed.  0404: I performed an exam of the patient and obtained history, as documented above.  IV inserted and blood drawn. The patient was placed on continuous cardiac monitoring and pulse oximetry.  ECG ordered, results above.   The patient was sent for a CT Chest Abdomen Pelvis while in the emergency department, findings  above.    0603: Pt placed on RA and O2 sats dropped to 87%.  Pt denied sob at this time and placed back on O2 at 3LNC    0606: I rechecked the patient. Explained findings to the patient.    0615: I spoke to Dr. Wang of the hospitalist service who accepts the patient for admission.     Findings and plan explained to the Patient who consents to admission.   Discussed the patient with Dr. Wang, who will admit the patient to a [facility] bed for further monitoring, evaluation, and treatment.     Impression & Plan      Medical Decision Making:  Ron Gilmore is a 76 year old male who presents for evaluation of shortness of breath.  History, physical exam and imaging studies are consistent with pneumonia. CT chest was ordered to rule out PE and is negative for PE.  First dose of antibiotics given in ED within 2 hours of diagnosis.  There are no signs of complications of pneumonia at this point such as septic shock, bacteremia, empyema, hypoxia, respiratory failure or compromise.    This seems to be community acquired pneumonia and there are no risk factors at this point for coverage of antibiotic-resistant strains.  I doubt PE, dissection, acute coronary syndrome, or other worrisome etiology for patients symptoms.  Would not cover for aspiration or antibiotic resistant strains of pneumonia at this point.  Given hypoxia, admit to medicine for further cares.      Diagnosis:    ICD-10-CM   1. Pneumonia of left upper lobe due to infectious organism J18.9     Disposition: Admitted    Bijal Herrmann  6/13/2017    EMERGENCY DEPARTMENT    I, Bijal Herrmann, am serving as a scribe at 4:04 AM on 6/13/2017 to document services personally performed by Jailyn Abreu MD based on my observations and the provider's statements to me.        Jailyn Abreu MD  06/14/17 [id number]

## 2017-06-13 NOTE — PLAN OF CARE
Problem: Goal Outcome Summary  Goal: Goal Outcome Summary  Outcome: No Change  Pt A/O 2-3. Intermittently confused and lethargic over day shift. VSS on 2L facemask (mouth breather). Pain in right eye d/t recent surgery. Turn/repo q2hr. Using urinal in bed. BC pending. Blood gases ordered for workup of intermittent confusion/lethargy. Continue to monitor.

## 2017-06-13 NOTE — PHARMACY-ADMISSION MEDICATION HISTORY
Admission medication history interview status for the 6/13/2017  admission is complete. See EPIC admission navigator for prior to admission medications     Medication history source reliability:Good    Actions taken by pharmacist (provider contacted, etc):None     Additional medication history information not noted on PTA med list :rn took med rec and confirmed with rx    Medication reconciliation/reorder completed by provider prior to medication history? Yes    Time spent in this activity: 15 min    Prior to Admission medications    Medication Sig Last Dose Taking? Auth Provider   HYDROcodone-acetaminophen (NORCO) 5-325 MG per tablet Take 2 tablets by mouth every 4 hours as needed for moderate to severe pain 6/12/2017 at Unknown time Yes Reported, Patient   ALLOPURINOL PO Take 300 mg by mouth daily 6/12/2017 at Unknown time Yes Unknown, Entered By History   PARoxetine HCl (PAXIL PO) Take 10 mg by mouth daily 6/12/2017 at Unknown time Yes Unknown, Entered By History   tamsulosin (FLOMAX) 0.4 MG 24 hr capsule Take 0.4 mg by mouth 2 times daily 6/12/2017 at Unknown time Yes Unknown, Entered By History   metoprolol (LOPRESSOR) 25 MG tablet Take 25 mg by mouth daily  6/12/2017 at Unknown time Yes    BABY ASPIRIN 81 MG OR CHEW 1 TABLET DAILY 6/12/2017 at Unknown time Yes Reported, Patient

## 2017-06-13 NOTE — PROGRESS NOTES
Asked to see Mr. Gilmore urgently for change in mental status. Patient was drowsy but alert enough to answer all my questions appropriately. Did try 0.2mg narcan once. No significant change noted. He stated he feels sleepy.     Reports he quit smoking on Nov 21, 1982. Does not use oxygen at home. ABG shows a mild respiratory acidosis with pH 7.33 pCo2 49 and Po2 91. Consider bipap if he becomes mores somnolent or requires more oxygen. Currently 3 liters facemask.     Please see detailed H+P earlier this morning from my colleague Dr. Wang for full plan of care.     Wali Ibrahim MD  [phone]

## 2017-06-13 NOTE — PROGRESS NOTES
Patient on 2L oxymask.  Started on scheduled nebs.  Lung sounds diminished, occasional exp. Wheezes.  ABG drawn :     Recent Labs  Lab 06/13/17  1347   PH 7.33*   PCO2 49*   PO2 91   HCO3 26     Will continue to follow.

## 2017-06-13 NOTE — ED NOTES
Pt placed on RA and O2 sats dropped to 87%.  Pt denied sob at this time and placed back on O2 at 3LNC.

## 2017-06-14 ENCOUNTER — APPOINTMENT (OUTPATIENT)
Dept: PHYSICAL THERAPY | Facility: CLINIC | Age: 77
DRG: 189 | End: 2017-06-14
Attending: INTERNAL MEDICINE
Payer: COMMERCIAL

## 2017-06-14 LAB
ERYTHROCYTE [DISTWIDTH] IN BLOOD BY AUTOMATED COUNT: 14.8 % (ref 10–15)
HCT VFR BLD AUTO: 40.1 % (ref 40–53)
HGB BLD-MCNC: 12.9 G/DL (ref 13.3–17.7)
MCH RBC QN AUTO: 30.9 PG (ref 26.5–33)
MCHC RBC AUTO-ENTMCNC: 32.2 G/DL (ref 31.5–36.5)
MCV RBC AUTO: 96 FL (ref 78–100)
PLATELET # BLD AUTO: 165 10E9/L (ref 150–450)
RBC # BLD AUTO: 4.18 10E12/L (ref 4.4–5.9)
WBC # BLD AUTO: 11.7 10E9/L (ref 4–11)

## 2017-06-14 PROCEDURE — 99232 SBSQ HOSP IP/OBS MODERATE 35: CPT | Performed by: INTERNAL MEDICINE

## 2017-06-14 PROCEDURE — 40000275 ZZH STATISTIC RCP TIME EA 10 MIN

## 2017-06-14 PROCEDURE — 97161 PT EVAL LOW COMPLEX 20 MIN: CPT | Mod: GP | Performed by: PHYSICAL THERAPIST

## 2017-06-14 PROCEDURE — 97530 THERAPEUTIC ACTIVITIES: CPT | Mod: GP | Performed by: PHYSICAL THERAPIST

## 2017-06-14 PROCEDURE — 85027 COMPLETE CBC AUTOMATED: CPT | Performed by: INTERNAL MEDICINE

## 2017-06-14 PROCEDURE — 40000193 ZZH STATISTIC PT WARD VISIT: Performed by: PHYSICAL THERAPIST

## 2017-06-14 PROCEDURE — 36415 COLL VENOUS BLD VENIPUNCTURE: CPT | Performed by: INTERNAL MEDICINE

## 2017-06-14 PROCEDURE — 94640 AIRWAY INHALATION TREATMENT: CPT

## 2017-06-14 PROCEDURE — 25000128 H RX IP 250 OP 636: Performed by: INTERNAL MEDICINE

## 2017-06-14 PROCEDURE — 25000132 ZZH RX MED GY IP 250 OP 250 PS 637: Performed by: INTERNAL MEDICINE

## 2017-06-14 PROCEDURE — 40000894 ZZH STATISTIC OT IP EVAL DEFER

## 2017-06-14 PROCEDURE — 12000000 ZZH R&B MED SURG/OB

## 2017-06-14 PROCEDURE — 25000125 ZZHC RX 250: Performed by: INTERNAL MEDICINE

## 2017-06-14 PROCEDURE — 94640 AIRWAY INHALATION TREATMENT: CPT | Mod: 76

## 2017-06-14 RX ADMIN — ERYTHROMYCIN: 5 OINTMENT OPHTHALMIC at 06:11

## 2017-06-14 RX ADMIN — IPRATROPIUM BROMIDE AND ALBUTEROL SULFATE 3 ML: .5; 3 SOLUTION RESPIRATORY (INHALATION) at 19:29

## 2017-06-14 RX ADMIN — TAMSULOSIN HYDROCHLORIDE 0.4 MG: 0.4 CAPSULE ORAL at 21:49

## 2017-06-14 RX ADMIN — RANITIDINE 150 MG: 150 TABLET ORAL at 21:49

## 2017-06-14 RX ADMIN — SODIUM CHLORIDE: 9 INJECTION, SOLUTION INTRAVENOUS at 13:08

## 2017-06-14 RX ADMIN — METOPROLOL TARTRATE 25 MG: 25 TABLET ORAL at 08:24

## 2017-06-14 RX ADMIN — IPRATROPIUM BROMIDE AND ALBUTEROL SULFATE 3 ML: .5; 3 SOLUTION RESPIRATORY (INHALATION) at 10:56

## 2017-06-14 RX ADMIN — ASPIRIN 81 MG 81 MG: 81 TABLET ORAL at 08:24

## 2017-06-14 RX ADMIN — ERYTHROMYCIN: 5 OINTMENT OPHTHALMIC at 13:09

## 2017-06-14 RX ADMIN — TAMSULOSIN HYDROCHLORIDE 0.4 MG: 0.4 CAPSULE ORAL at 08:23

## 2017-06-14 RX ADMIN — SODIUM CHLORIDE: 9 INJECTION, SOLUTION INTRAVENOUS at 02:59

## 2017-06-14 RX ADMIN — CEFTRIAXONE 2 G: 2 INJECTION, POWDER, FOR SOLUTION INTRAMUSCULAR; INTRAVENOUS at 06:10

## 2017-06-14 RX ADMIN — ALLOPURINOL 300 MG: 300 TABLET ORAL at 08:24

## 2017-06-14 RX ADMIN — PAROXETINE HYDROCHLORIDE 10 MG: 10 TABLET, FILM COATED ORAL at 08:24

## 2017-06-14 RX ADMIN — AZITHROMYCIN 250 MG: 250 TABLET, FILM COATED ORAL at 06:11

## 2017-06-14 RX ADMIN — PREDNISONE 60 MG: 20 TABLET ORAL at 08:23

## 2017-06-14 RX ADMIN — IPRATROPIUM BROMIDE AND ALBUTEROL SULFATE 3 ML: .5; 3 SOLUTION RESPIRATORY (INHALATION) at 15:47

## 2017-06-14 RX ADMIN — ERYTHROMYCIN: 5 OINTMENT OPHTHALMIC at 00:08

## 2017-06-14 RX ADMIN — RANITIDINE 150 MG: 150 TABLET ORAL at 08:23

## 2017-06-14 RX ADMIN — IPRATROPIUM BROMIDE AND ALBUTEROL SULFATE 3 ML: .5; 3 SOLUTION RESPIRATORY (INHALATION) at 07:50

## 2017-06-14 NOTE — PLAN OF CARE
Problem: Goal Outcome Summary  Goal: Goal Outcome Summary  OT: Order received and chart reviewed. Attempted OT eval; however, pt declined skilled OT intervention. Pt states he uses powered scooter primarily and receives A from wife for ADLs. Pt aware of OT role and declined OT services. Pt agreeable to PT for transfer training during hospitalization.

## 2017-06-14 NOTE — PLAN OF CARE
Problem: Goal Outcome Summary  Goal: Goal Outcome Summary  Outcome: No Change  Pt A/O. VSS on 2L facemask (mouth breather). Denies pain.  Turn/repo q2hr. Using urinal in bed. BC pending.  Continue to monitor.

## 2017-06-14 NOTE — PLAN OF CARE
Problem: Goal Outcome Summary  Goal: Goal Outcome Summary  Outcome: No Change  Pt A/O. VSS on 2L facemask (mouth breather). Denies pain. Turn/repo q2hr. Using urinal in bed. IVF infusing. BC pending. Still needs sputum culture to collect. Continue to monitor.

## 2017-06-14 NOTE — PLAN OF CARE
Problem: Goal Outcome Summary  Goal: Goal Outcome Summary  Outcome: Improving  Pt A/Ox3-4. Forgetful at times. VSS on 2L oxymask (mouth breather). Denies pain. Up A2. Pivots to chair well. Turn/repo q2hr while in bed - refused repos at times. Educated on importance of shifting weight. Voids per urinal. BC pending.  Continue to monitor

## 2017-06-14 NOTE — PROGRESS NOTES
06/14/17 1338   Quick Adds   Type of Visit Initial PT Evaluation   Living Environment   Lives With spouse   Living Arrangements condominium   Home Accessibility tub/shower is not walk in   Transportation Available van, wheelchair accessible  (pt does not drive)   Living Environment Comment Pt lives in an accessible condo with his wife, has a tub/shower with extended tub bench   Self-Care   Dominant Hand right   Usual Activity Tolerance moderate   Current Activity Tolerance poor   Regular Exercise no   Equipment Currently Used at Home power chair;bath bench;grab bar;raised toilet;walker, rolling   Activity/Exercise/Self-Care Comment Pt sleeps on hospital bed, has RTS and grab bars, extended tub bench, lift chair   Functional Level Prior   Ambulation 1-->assistive equipment  (needs to ambulate ~10 feet into bathroom)   Transferring 1-->assistive equipment   Toileting 1-->assistive equipment   Bathing 3-->assistive equipment and person   Dressing 1-->assistive equipment   Eating 0-->independent   Communication 0-->understands/communicates without difficulty   Swallowing 0-->swallows foods/liquids without difficulty   Cognition 0 - no cognition issues reported   Fall history within last six months yes   Number of times patient has fallen within last six months 3   Which of the above functional risks had a recent onset or change? ambulation;transferring;toileting;bathing   Prior Functional Level Comment Pt reports his wife assists with majority of ADLs, pt was otherwise modified independent with functional mobility for pivot transfers with FWW, uses power scooter for mobility but needs to be able to walk 10' into bathroom as pt's scooter does not fit into room.   General Information   Onset of Illness/Injury or Date of Surgery - Date 06/13/17   Referring Physician Wali Ibrahim MD   Patient/Family Goals Statement Not stated   Pertinent History of Current Problem (include personal factors and/or comorbidities that impact  "the POC) Pt is a 75yo male admitted for SOB, found to have RUL pneumonia. Significant PMH includes CVA, see EMR for further details regarding PMH.   Precautions/Limitations fall precautions;oxygen therapy device and L/min  (2L via oxymask (mouth breather))   Weight-Bearing Status - LLE full weight-bearing   Weight-Bearing Status - RLE full weight-bearing   General Observations Pt resting in bed upon arrival, pt with IV, oxymask on 2L, PCDs   Cognitive Status Examination   Orientation orientation to person, place and time   Level of Consciousness alert   Follows Commands and Answers Questions 100% of the time;able to follow multistep instructions   Personal Safety and Judgment at risk behaviors demonstrated   Memory intact   Pain Assessment   Patient Currently in Pain (\"a little bit\" in R great toe, R hip)   Integumentary/Edema   Integumentary/Edema Comments Bruised dorsum R foot from fall   Posture    Posture Forward head position;Protracted shoulders   Range of Motion (ROM)   ROM Comment RLE WNL with AROM, LLE WNL with AAROM - unable to assess L foot dorsiflexion d/t pain from fall   Strength   Strength Comments Pt appears generally weak, LLE slightly weaker at baseline and observed today. RLE strength grossly 4/5, LLE grossly 3+/5.   Bed Mobility   Bed Mobility Comments CGA with HOB 70 degrees and heavy use of rail   Transfer Skills   Transfer Comments Joel of 2 for sit>stand to FWW from elevated bed   Gait   Gait Comments Did not assess as pt only tolerating transfer to chair   Balance   Balance Comments Fairly good seated balance at EOB, poor standing balance   Sensory Examination   Sensory Perception Comments Noting pt to have intention tremor on LUE - pt states baseline since CVA   Coordination   Coordination Comments Gross motor coordination appears WNL   Muscle Tone   Muscle Tone no deficits were identified   General Therapy Interventions   Planned Therapy Interventions balance training;bed mobility " "training;gait training;neuromuscular re-education;ROM;strengthening;stretching;transfer training;progressive activity/exercise   Clinical Impression   Criteria for Skilled Therapeutic Intervention yes, treatment indicated   PT Diagnosis Difficulty with gait   Influenced by the following impairments Pain, weakness, deconditioning and decreased activity tolerance, impaired balance   Functional limitations due to impairments Decreased safety and independence with bed mobility, transfers, and short distance ambulation   Clinical Presentation Stable/Uncomplicated   Clinical Decision Making (Complexity) Low complexity   Therapy Frequency` daily   Predicted Duration of Therapy Intervention (days/wks) 5 days   Anticipated Discharge Disposition Transitional Care Facility   Risk & Benefits of therapy have been explained Yes   Patient, Family & other staff in agreement with plan of care Yes   [facility] [employer] \"6 Clicks\"   2016, Trustees of [facility], under license to [employer].  All rights reserved.   6 Clicks Short Forms Basic Mobility Inpatient Short Form   [facility] [employer]  \"6 Clicks\" V.2 Basic Mobility Inpatient Short Form   1. Turning from your back to your side while in a flat bed without using bedrails? 3 - A Little   2. Moving from lying on your back to sitting on the side of a flat bed without using bedrails? 3 - A Little   3. Moving to and from a bed to a chair (including a wheelchair)? 2 - A Lot   4. Standing up from a chair using your arms (e.g., wheelchair, or bedside chair)? 2 - A Lot   5. To walk in hospital room? 1 - Total   6. Climbing 3-5 steps with a railing? 1 - Total   Basic Mobility Raw Score (Score out of 24.Lower scores equate to lower levels of function) 12   Total Evaluation Time   Total Evaluation Time (Minutes) 10     "

## 2017-06-14 NOTE — PROGRESS NOTES
Patient on 2L oxymask. Pt has blanchable redness on the bridge of the nose. Mepilex in place. LS coarse with expiratory wheezes. SpO2 in the mid 90's. Neb tx given as ordered. Will continue to follow.    Jaycee ARAUJO

## 2017-06-14 NOTE — PLAN OF CARE
Problem: Goal Outcome Summary  Goal: Goal Outcome Summary  PT: PT orders received, evaluation completed, treatment initiated. Pt is a 75yo male admitted for SOB, found to have RUL pneumonia. Pt lives with his wife in an accessible condo, no stairs to navigate, tub/shower with extended tub bench, RTS. Pt reports his wife assists with majority of ADLs and performs all housekeeping tasks, pt was modified independent with bed mobility (with his hospital bed), pivot transfers with FWW, and use of electric scooter majority of the time except pt needs to be able to ambulate ~10 feet into bathroom where his scooter doesn't fit. Pt also has lift chair.      Discharge Planner PT   Patient plan for discharge: Home  Current status: Pt on 2L O2 via oxymask with SpO2 95% upon arrival. With HOB elevated and use of rail, pt able to transfer supine>sitting EOB with CGA. Pt transfers sit<>stand from elevated height of bed to FWW with Joel of 2. Pt pivot transfers bed>recliner with FWW and Joel of 2. Pt unsteady on feet, mild LLE buckling but no overt LOB, pt having difficulty clearing feet. Pt requried 3L O2 for activity as pt desaturates to 87% and reports SOB with activity on 2L O2. Updated RN, pt may need use of lift to return to bed as pt does not usually stand from low surface as he has a lift chair.   Barriers to return to prior living situation: Current level of assist, fall risk  Recommendations for discharge: TCU  Rationale for recommendations: Decreased functional activity tolerance, weakness, impaired balance, fall risk       Entered by: Leslie Lopes 06/14/2017 2:26 PM

## 2017-06-14 NOTE — PROGRESS NOTES
[facility]    Hospitalist Progress Note    Date of Service (when I saw the patient): 06/14/2017    Assessment & Plan   Ron Gilmore is a 76 year old male who was admitted on 6/13/2017. Presented with acute respiratory failure secondary to right upper lobe pneumonia and chronic obstructive pulmonary disease exacerbation.   1.  Acute respiratory failure.  He has a right upper lobe infiltrate for which he has been initiated on IV ceftriaxone and IV azithromycin. Continue aggressive respiratory support with oxygen and nebulizers. He was given Solu-Medrol in the emergency department. Continues prednisone 60 mg daily.    2.  Right upper lobe pneumonia as noted above. Continue azithromycin and ceftriaxone.     3.  Chronic obstructive pulmonary disease with likely exacerbation. Continue prednisone and duoNebs, albuterol nebs.   4.  Recent eye surgery. Continue erythromycin eye drops.   5.  Benign prostatic hypertrophy. Continue Flomax.   6.  History of stroke. Continue his metoprolol and aspirin.   7.  Type 2 diabetes.  This is diet-controlled.   8.  Deep venous thrombosis prophylaxis will be mechanical.    9.  CODE STATUS: Full code.   10.  Morbid obesity, BMI is greater than 40 and would benefit from weight loss.   Disposition: Inpatient status. Expected discharge in 2-3 days. TCU on discharge.     Wali Ibrahim MD  Pager:  [phone]  Text Page (7 am to 6 pm)      Interval History   More awake, alert.     -Data reviewed today: I reviewed all new labs and imaging results over the last 24 hours.      Physical Exam   Temp: 95.6  F (35.3  C) Temp src: Axillary BP: 126/64   Heart Rate: 82 Resp: 16 SpO2: 99 % O2 Device: Oxymask Oxygen Delivery: 2 LPM  Vitals:    06/13/17 0351 06/13/17 0757 06/14/17 0707   Weight: 136.1 kg (300 lb) (!) 138.7 kg (305 lb 12.5 oz) (!) 140 kg (308 lb 10.3 oz)     Vital Signs with Ranges  Temp:  [95.6  F (35.3  C)-97.2  F (36.2  C)] 95.6  F (35.3  C)  Heart Rate:  [64-98]  82  Resp:  [12-20] 16  BP: (126-154)/(64-80) 126/64  SpO2:  [93 %-99 %] 99 %  I/O last 3 completed shifts:  In: 860 [P.O.:60; I.V.:800]  Out: 1275 [Urine:1275]    GENERAL:  Pleasant, sitting up in chair. No acute distress  HEAD:  NC/AT  EYES: Right eye ecchymoses.   NECK:  Supple  CARDIAC: Regular rate and rhythm.  +S1/S2.  No murmurs, rubs, or gallops.  RESPIRATORY: Diminished bilaterally  GI:  Soft, nontender, nondistended, no rebound or guarding.  Active bowel sounds.  LYMPHATICS:  No cyanosis or edema.  Distal pulses palpable.  SKIN: No rashes.    NEURO: CN II-XII intact.     Medications     NaCl 100 mL/hr at 06/14/17 1308       allopurinol (ZYLOPRIM) tablet 300 mg  300 mg Oral Daily     metoprolol  25 mg Oral Daily     PARoxetine (PAXIL) tablet 10 mg  10 mg Oral Daily     tamsulosin  0.4 mg Oral BID     erythromycin   Right Eye Q8H FERNANDA     cefTRIAXone  2 g Intravenous Q24H     azithromycin  250 mg Oral Q24H     ranitidine  150 mg Oral BID     predniSONE  60 mg Oral Daily     aspirin  81 mg Oral Daily     ipratropium - albuterol 0.5 mg/2.5 mg/3 mL  3 mL Nebulization 4x daily       Data     Recent Labs  Lab 06/14/17  0717 06/13/17  0350   WBC 11.7* 10.0   HGB 12.9* 14.2   MCV 96 96    159   NA  --  137   POTASSIUM  --  4.0   CHLORIDE  --  100   CO2  --  29   BUN  --  15   CR  --  0.82   ANIONGAP  --  8   RAJ  --  8.9   GLC  --  129*       No results found for this or any previous visit (from the past 24 hour(s)).

## 2017-06-15 LAB
BASE EXCESS BLDA CALC-SCNC: 3.5 MMOL/L
HCO3 BLD-SCNC: 30 MMOL/L (ref 21–28)
OXYHGB MFR BLD: 87 % (ref 92–100)
PCO2 BLD: 54 MM HG (ref 35–45)
PH BLD: 7.35 PH (ref 7.35–7.45)
PO2 BLD: 54 MM HG (ref 80–105)

## 2017-06-15 PROCEDURE — 94640 AIRWAY INHALATION TREATMENT: CPT

## 2017-06-15 PROCEDURE — 94640 AIRWAY INHALATION TREATMENT: CPT | Mod: 76

## 2017-06-15 PROCEDURE — 40000275 ZZH STATISTIC RCP TIME EA 10 MIN

## 2017-06-15 PROCEDURE — 82805 BLOOD GASES W/O2 SATURATION: CPT | Performed by: INTERNAL MEDICINE

## 2017-06-15 PROCEDURE — 99232 SBSQ HOSP IP/OBS MODERATE 35: CPT | Performed by: INTERNAL MEDICINE

## 2017-06-15 PROCEDURE — 36600 WITHDRAWAL OF ARTERIAL BLOOD: CPT

## 2017-06-15 PROCEDURE — 99207 ZZC APP CREDIT; MD BILLING SHARED VISIT: CPT | Performed by: INTERNAL MEDICINE

## 2017-06-15 PROCEDURE — 25000125 ZZHC RX 250: Performed by: INTERNAL MEDICINE

## 2017-06-15 PROCEDURE — 25000128 H RX IP 250 OP 636: Performed by: INTERNAL MEDICINE

## 2017-06-15 PROCEDURE — 12000000 ZZH R&B MED SURG/OB

## 2017-06-15 PROCEDURE — 25000132 ZZH RX MED GY IP 250 OP 250 PS 637: Performed by: INTERNAL MEDICINE

## 2017-06-15 RX ADMIN — RANITIDINE 150 MG: 150 TABLET ORAL at 21:07

## 2017-06-15 RX ADMIN — PREDNISONE 60 MG: 20 TABLET ORAL at 08:01

## 2017-06-15 RX ADMIN — ERYTHROMYCIN: 5 OINTMENT OPHTHALMIC at 01:34

## 2017-06-15 RX ADMIN — RANITIDINE 150 MG: 150 TABLET ORAL at 08:01

## 2017-06-15 RX ADMIN — IPRATROPIUM BROMIDE AND ALBUTEROL SULFATE 3 ML: .5; 3 SOLUTION RESPIRATORY (INHALATION) at 11:05

## 2017-06-15 RX ADMIN — IPRATROPIUM BROMIDE AND ALBUTEROL SULFATE 3 ML: .5; 3 SOLUTION RESPIRATORY (INHALATION) at 15:21

## 2017-06-15 RX ADMIN — AZITHROMYCIN 250 MG: 250 TABLET, FILM COATED ORAL at 06:54

## 2017-06-15 RX ADMIN — ALLOPURINOL 300 MG: 300 TABLET ORAL at 08:01

## 2017-06-15 RX ADMIN — TAMSULOSIN HYDROCHLORIDE 0.4 MG: 0.4 CAPSULE ORAL at 08:01

## 2017-06-15 RX ADMIN — ERYTHROMYCIN: 5 OINTMENT OPHTHALMIC at 07:47

## 2017-06-15 RX ADMIN — IPRATROPIUM BROMIDE AND ALBUTEROL SULFATE 3 ML: .5; 3 SOLUTION RESPIRATORY (INHALATION) at 07:20

## 2017-06-15 RX ADMIN — TAMSULOSIN HYDROCHLORIDE 0.4 MG: 0.4 CAPSULE ORAL at 21:07

## 2017-06-15 RX ADMIN — ERYTHROMYCIN: 5 OINTMENT OPHTHALMIC at 16:39

## 2017-06-15 RX ADMIN — CEFTRIAXONE 2 G: 2 INJECTION, POWDER, FOR SOLUTION INTRAMUSCULAR; INTRAVENOUS at 05:30

## 2017-06-15 RX ADMIN — IPRATROPIUM BROMIDE AND ALBUTEROL SULFATE 3 ML: .5; 3 SOLUTION RESPIRATORY (INHALATION) at 19:08

## 2017-06-15 RX ADMIN — METOPROLOL TARTRATE 25 MG: 25 TABLET ORAL at 08:01

## 2017-06-15 RX ADMIN — PAROXETINE HYDROCHLORIDE 10 MG: 10 TABLET, FILM COATED ORAL at 08:00

## 2017-06-15 RX ADMIN — ASPIRIN 81 MG 81 MG: 81 TABLET ORAL at 08:01

## 2017-06-15 NOTE — PROGRESS NOTES
[facility]    Hospitalist Progress Note    Date of Service (when I saw the patient): 06/15/2017    Assessment & Plan   Ron Gilmore is a 76 year old male who was admitted on 6/13/2017. Presented with acute respiratory failure secondary to right upper lobe pneumonia and chronic obstructive pulmonary disease exacerbation.   1.  Acute hypoxic and hypercapnic respiratory failure.  He has a right upper lobe infiltrate for which he has been initiated on IV ceftriaxone and IV azithromycin. Continue aggressive respiratory support with oxygen and nebulizers. He was given Solu-Medrol in the emergency department. Continues prednisone 60 mg daily.  2. Apnea. Early am on 6/16 was more hypoxic and reportedly had periods of apnea. Patient reports he had a negative sleep study 8 years ago at [facility]. He likely has developed sleep apnea. Will need a sleep study as an outpatient.   3.  Right upper lobe pneumonia as noted above. Continue azithromycin and ceftriaxone.     4.  Chronic obstructive pulmonary disease with likely exacerbation. Continue prednisone and duoNebs, albuterol nebs.   5.  Recent eye surgery. Continue erythromycin eye drops.   6.  Benign prostatic hypertrophy. Continue Flomax.   7.  History of stroke. Continue his metoprolol and aspirin.   8.  Type 2 diabetes.  This is diet-controlled.   9.   Morbid obesity, BMI is greater than 40 and would benefit from weight loss.   10.  LE edema. Bilateral. Not painful. Likely from IV fluids. Will stop fluids now.   11.Deep venous thrombosis prophylaxis mechanical.     12.  CODE STATUS: Full code.   Disposition: Inpatient status. Expected discharge in 1-2 days. TCU on discharge. SW to talk to him as he is very worried about financial aspects of going to a tcu.      Wali Ibrahim MD  Pager:  [phone]  Text Page (7 am to 6 pm)    Interval History   Apneic event early this morning.     -Data reviewed today: I reviewed all new labs and imaging results over the  last 24 hours.      Physical Exam   Temp: 96.6  F (35.9  C) Temp src: Oral BP: 120/70   Heart Rate: 82 Resp: 18 SpO2: 94 % O2 Device: Nasal cannula Oxygen Delivery: 2 LPM  Vitals:    06/13/17 0351 06/13/17 0757 06/14/17 0707   Weight: 136.1 kg (300 lb) (!) 138.7 kg (305 lb 12.5 oz) (!) 140 kg (308 lb 10.3 oz)     Vital Signs with Ranges  Temp:  [96.5  F (35.8  C)-97.7  F (36.5  C)] 96.6  F (35.9  C)  Heart Rate:  [] 82  Resp:  [16-19] 18  BP: (120-155)/(70-82) 120/70  SpO2:  [82 %-99 %] 94 %  I/O last 3 completed shifts:  In: 2646 [P.O.:240; I.V.:2406]  Out: 1325 [Urine:1325]    GENERAL:  Pleasant, laying in bed. No acute distress  HEAD:  NC/AT  EYES: Right eye ecchymoses.   NECK:  Supple  CARDIAC: Regular rate and rhythm.  +S1/S2.  No murmurs, rubs, or gallops.  RESPIRATORY: Diminished bilaterally  GI:  Soft, nontender, nondistended, no rebound or guarding.  Active bowel sounds.  LYMPHATICS:  1+ edema.  Distal pulses palpable.  SKIN: No rashes.    NEURO: CN II-XII intact.     Medications        allopurinol (ZYLOPRIM) tablet 300 mg  300 mg Oral Daily     metoprolol  25 mg Oral Daily     PARoxetine (PAXIL) tablet 10 mg  10 mg Oral Daily     tamsulosin  0.4 mg Oral BID     erythromycin   Right Eye Q8H FERNANDA     cefTRIAXone  2 g Intravenous Q24H     azithromycin  250 mg Oral Q24H     ranitidine  150 mg Oral BID     predniSONE  60 mg Oral Daily     aspirin  81 mg Oral Daily     ipratropium - albuterol 0.5 mg/2.5 mg/3 mL  3 mL Nebulization 4x daily       Data     Recent Labs  Lab 06/14/17  0717 06/13/17  0350   WBC 11.7* 10.0   HGB 12.9* 14.2   MCV 96 96    159   NA  --  137   POTASSIUM  --  4.0   CHLORIDE  --  100   CO2  --  29   BUN  --  15   CR  --  0.82   ANIONGAP  --  8   RAJ  --  8.9   GLC  --  129*       No results found for this or any previous visit (from the past 24 hour(s)).

## 2017-06-15 NOTE — PROVIDER NOTIFICATION
Patient more somnolent and confused tonight. Requiring repeated stimulation to wake. On 2-3L oxy mask, patient having multiple epidodes of apnea and de-sating into low 80s often.  On call paged, STAT ABGs orderd per Dr Wang.

## 2017-06-15 NOTE — PROGRESS NOTES
"Care Transition Initial Assessment - SW  Reason For Consult: discharge planning  Met with: PATIENT   Active Problems:    Pneumonia         DATA  Lives With: spouse  Living Arrangements: condominium  Description of Support System: Supportive, Involved  Who is your support system?: Wife  Support Assessment: Adequate family and caregiver support.   Identified issues/concerns regarding health management: d/c needs (TCU vs HC)  Quality Of Family Relationships: supportive, involved  Transportation Available: van, wheelchair accessible    ASSESSMENT  Cognitive Status: Alert and oriented x4  Concerns to be addressed: Patient is a 76 year old male who was admitted to the hospital for pneumonia. Prior to hospitalization patient was living at home with spouse where he was w/c bound at baseline. SW discussed recommendations from therapy. Patient is adamantly refusing TCU. SW explained that insurance pays for TCU stay for first 20 days at 100% and then a copay from days . Patient refused to discuss TCU. Patient would like to d/c home with home care. Patient feels he has not been working as hard as he could at the hospital because he was being \"Lazy and doesn't need to\". SW discussed new O2. Patient understands he will more than likely d/c home with New O2. SW will send orders on day of d/c for O2. SW will continue to follow and assess for d/c needs. Patient is aware that main goal for d/c is safety.     PLAN  Financial costs for the patient includes   Patient given options and choices for discharge   Patient/family is agreeable to the plan?  YES  Patient Goals and Preferences: HC  Patient anticipates discharging to:  home  Discharge Planner   Discharge Plans in progress:   Barriers to discharge plan:   Follow up plan:        Entered by: Sonia Maciel 06/15/2017 11:37 AM           "

## 2017-06-15 NOTE — PLAN OF CARE
Problem: Goal Outcome Summary  Goal: Goal Outcome Summary  Outcome: No Change  Pt A/Ox3-4. No confusion episodes on day shift. VSS on 2L NC. Denies pain. Up A1+walker - pivots to chair. T/R q2hr while in bed.  Reg diet, good appetite for lunch.  Uses urinal at bedside - incontinent at times. Continue to monitor.

## 2017-06-15 NOTE — PLAN OF CARE
Problem: Goal Outcome Summary  Goal: Goal Outcome Summary  Outcome: No Change  Patient confused and somnolent the early part of the night. Several Apnea periods of 10-20 seconds throughout night,O2 sats would drop into low 80s. Patient would return to high 90s afterwards. Maintaining on 2LNC when awake. IV fluids continue along with IV antibiotics. Patient incontinent of urine at times, used urinal this morning. Patient was awake and oriented x 4 this morning. D/c pending.

## 2017-06-15 NOTE — PLAN OF CARE
Problem: Goal Outcome Summary  Goal: Goal Outcome Summary  Outcome: No Change  Pt A/Ox1-4. Provided pt lots of support and reassurance when anxious this evening-pt stated he thought he was going to die.  Reoriented pt and provided emotional support. Forgetful at times.   VSS on 2L oxymask (mouth breather). Denies pain. Up A2.  Voids per urinal. BC pending.  Continue to monitor.

## 2017-06-15 NOTE — PROGRESS NOTES
HOSPITALIST CROSS COVER NOTE    Called by RN about somolence, low O2 sats.     RN is concerned patient may be hypercapnic.    Will obtain ABG    YARON GUADALUPE MD

## 2017-06-15 NOTE — PLAN OF CARE
Problem: Discharge Planning  Goal: Discharge Planning (Adult, OB, Behavioral, Peds)  Patient's goal is for safe d/c

## 2017-06-15 NOTE — PROGRESS NOTES
Pt remains on 2 lpm NC, SpO2 96%, BBS diminished, neb were given as ordered, will continue to monitor the pt.     RT Evelin.

## 2017-06-15 NOTE — PLAN OF CARE
Problem: Goal Outcome Summary  Goal: Goal Outcome Summary  PT pt approached for PT, declining, stating he is too fatigued, just back into bed, states he can transfer himself bed to chair.  Reports his main concern is falling, he has to amb about 10' into bathroom at home as chair does not fit.  PT requesting knee brace for L knee as it has buckled on him leading to falls.  Rec trial here with KI for amb to increase stability and then f/u with ortho as OP.  PT in agreement.  Rec was for TCU, pt refusing would benefit from home PT safety eval.

## 2017-06-16 ENCOUNTER — APPOINTMENT (OUTPATIENT)
Dept: PHYSICAL THERAPY | Facility: CLINIC | Age: 77
DRG: 189 | End: 2017-06-16
Payer: COMMERCIAL

## 2017-06-16 LAB
GRAM STN SPEC: ABNORMAL
Lab: ABNORMAL
MICRO REPORT STATUS: ABNORMAL
SPECIMEN SOURCE: ABNORMAL

## 2017-06-16 PROCEDURE — 40000193 ZZH STATISTIC PT WARD VISIT

## 2017-06-16 PROCEDURE — 25000128 H RX IP 250 OP 636: Performed by: INTERNAL MEDICINE

## 2017-06-16 PROCEDURE — 87070 CULTURE OTHR SPECIMN AEROBIC: CPT | Performed by: INTERNAL MEDICINE

## 2017-06-16 PROCEDURE — 94640 AIRWAY INHALATION TREATMENT: CPT

## 2017-06-16 PROCEDURE — 97530 THERAPEUTIC ACTIVITIES: CPT | Mod: GP

## 2017-06-16 PROCEDURE — 25000125 ZZHC RX 250: Performed by: INTERNAL MEDICINE

## 2017-06-16 PROCEDURE — 99232 SBSQ HOSP IP/OBS MODERATE 35: CPT | Performed by: INTERNAL MEDICINE

## 2017-06-16 PROCEDURE — 12000000 ZZH R&B MED SURG/OB

## 2017-06-16 PROCEDURE — 25000132 ZZH RX MED GY IP 250 OP 250 PS 637: Performed by: INTERNAL MEDICINE

## 2017-06-16 PROCEDURE — 94640 AIRWAY INHALATION TREATMENT: CPT | Mod: 76

## 2017-06-16 PROCEDURE — 87205 SMEAR GRAM STAIN: CPT | Performed by: INTERNAL MEDICINE

## 2017-06-16 PROCEDURE — 40000275 ZZH STATISTIC RCP TIME EA 10 MIN

## 2017-06-16 RX ORDER — PREDNISONE 20 MG/1
40 TABLET ORAL DAILY
Status: DISCONTINUED | OUTPATIENT
Start: 2017-06-17 | End: 2017-06-17 | Stop reason: HOSPADM

## 2017-06-16 RX ADMIN — IPRATROPIUM BROMIDE AND ALBUTEROL SULFATE 3 ML: .5; 3 SOLUTION RESPIRATORY (INHALATION) at 14:56

## 2017-06-16 RX ADMIN — PAROXETINE HYDROCHLORIDE 10 MG: 10 TABLET, FILM COATED ORAL at 09:34

## 2017-06-16 RX ADMIN — TAMSULOSIN HYDROCHLORIDE 0.4 MG: 0.4 CAPSULE ORAL at 20:58

## 2017-06-16 RX ADMIN — ERYTHROMYCIN: 5 OINTMENT OPHTHALMIC at 09:34

## 2017-06-16 RX ADMIN — ERYTHROMYCIN: 5 OINTMENT OPHTHALMIC at 00:43

## 2017-06-16 RX ADMIN — AZITHROMYCIN 250 MG: 250 TABLET, FILM COATED ORAL at 07:06

## 2017-06-16 RX ADMIN — IPRATROPIUM BROMIDE AND ALBUTEROL SULFATE 3 ML: .5; 3 SOLUTION RESPIRATORY (INHALATION) at 19:11

## 2017-06-16 RX ADMIN — IPRATROPIUM BROMIDE AND ALBUTEROL SULFATE 3 ML: .5; 3 SOLUTION RESPIRATORY (INHALATION) at 10:46

## 2017-06-16 RX ADMIN — ASPIRIN 81 MG 81 MG: 81 TABLET ORAL at 09:34

## 2017-06-16 RX ADMIN — RANITIDINE 150 MG: 150 TABLET ORAL at 09:34

## 2017-06-16 RX ADMIN — TAMSULOSIN HYDROCHLORIDE 0.4 MG: 0.4 CAPSULE ORAL at 09:34

## 2017-06-16 RX ADMIN — CEFTRIAXONE 2 G: 2 INJECTION, POWDER, FOR SOLUTION INTRAMUSCULAR; INTRAVENOUS at 06:49

## 2017-06-16 RX ADMIN — ALLOPURINOL 300 MG: 300 TABLET ORAL at 09:34

## 2017-06-16 RX ADMIN — ERYTHROMYCIN: 5 OINTMENT OPHTHALMIC at 16:06

## 2017-06-16 RX ADMIN — IPRATROPIUM BROMIDE AND ALBUTEROL SULFATE 3 ML: .5; 3 SOLUTION RESPIRATORY (INHALATION) at 06:56

## 2017-06-16 RX ADMIN — PREDNISONE 60 MG: 20 TABLET ORAL at 09:34

## 2017-06-16 RX ADMIN — RANITIDINE 150 MG: 150 TABLET ORAL at 20:58

## 2017-06-16 RX ADMIN — METOPROLOL TARTRATE 25 MG: 25 TABLET ORAL at 09:34

## 2017-06-16 NOTE — DISCHARGE INSTRUCTIONS
[facility] [phone], Fax: [phone]    [facility] [place] [facility] [phone], Fax: [phone]    You are scheduled for a post hospital follow up with Dr. Thomas on June 22nd at 2:45 p.m. Located at:  [address] [facility] [address], [facility]     Oxygen Provider:  Arranged through [facility], contact number [phone].  If you have any questions or concerns please call the oxygen company directly.

## 2017-06-16 NOTE — PLAN OF CARE
Problem: Goal Outcome Summary  Goal: Goal Outcome Summary  Outcome: Improving  Pt a/o x4. Denies pain. Up to chair with 1-2 assist and pivot. Destats to 84% w/o O2. Stable on 3L. Good appetite on regular diet. Repositioning as pt allows. Planning to DC tomorrow to home with [facility]. Will continue to monitor.

## 2017-06-16 NOTE — PROGRESS NOTES
Patient on 2L NC.  Lung sounds diminished/ coarse.  Non productive cough.  Getting scheduled nebs.  Will continue to follow.

## 2017-06-16 NOTE — PROGRESS NOTES
JEROME  I: SW met with patient to discuss d/c planning needs. Patient is continuing to refuse TCU and is agreeable to HC. Patient would like to use [facility] for RN/PT/HHA services. Patient will need O2 at d/c. Patient was agreeable to using [facility] for O2. SW will need to send referral to both [facility] and [facility].  Patient's family will transport patient at d/c.    P: SW will continue to follow and assist as needed.      ADDENDUM  I: JEROME was updated that patient was now more agreeable to TCU and patient would like a referral sent to [facility], [facility], and [facility]. SW sent referral via [facility]. Patient will need [facility].       ADDENDUM  I: Patient was accepted at [facility] with a private room at no fee. [facility] would potentially be able to accept patient on Sunday. SW will update patient. SW updated patient and he was ok with information but has no agreed completely to go to TCU. SW will need to follow up in AM and update [place] if patient is ready for d/c Saturday.    Sonia Maciel, TIFFANY   *[phone]

## 2017-06-16 NOTE — PROGRESS NOTES
[facility]    Hospitalist Progress Note    Date of Service (when I saw the patient): 06/16/2017    Assessment & Plan   Ron Gilmore is a 76 year old male who was admitted on 6/13/2017. Presented with acute respiratory failure secondary to right upper lobe pneumonia and chronic obstructive pulmonary disease exacerbation.   1.  Acute hypoxic and hypercapnic respiratory failure. He has a right upper lobe infiltrate for which he has been initiated on IV ceftriaxone and IV azithromycin. Continue aggressive respiratory support with oxygen and nebulizers. He was given Solu-Medrol in the emergency department. Continues prednisone 60 mg daily. Respiratory status improving. Will start weaning steroids down to 40 mg daily.   2. Apnea. Early am on 6/16 was more hypoxic and reportedly had periods of apnea. Patient reports he had a negative sleep study 8 years ago at [facility]. He likely has developed sleep apnea. Will need a sleep study as an outpatient.   3.  Right upper lobe pneumonia as noted above. Continue azithromycin and ceftriaxone.     4.  Chronic obstructive pulmonary disease with likely exacerbation. Continue prednisone and duoNebs, albuterol nebs.   5.  Recent eye surgery. Continue erythromycin eye drops.   6.  Benign prostatic hypertrophy. Continue Flomax.   7.  History of stroke. Continue his metoprolol and aspirin.   8.  Type 2 diabetes.  This is diet-controlled.   9.   Morbid obesity, BMI is greater than 40 and would benefit from weight loss.   10.  LE edema. Bilateral. Not painful. Likely from IV fluids. Stopped IV fluids.   11.Deep venous thrombosis prophylaxis mechanical.     12.  CODE STATUS: Full code.   Disposition: Inpatient status. Expected discharge Likely in 24 hours. Therapy recs are for TCU. Patient wants to go home with home care. Likely RN/PT/OT/HHA.   May need oxygen on discharge.       Wali Ibrahim MD  Pager:  [phone]  Text Page (7 am to 6 pm)    Interval History   Trialing off  oxygen.     -Data reviewed today: I reviewed all new labs and imaging results over the last 24 hours.      Physical Exam   Temp: 96.4  F (35.8  C) Temp src: Oral BP: 97/50 Pulse: 74 Heart Rate: 99 Resp: 16 SpO2: (!) 84 % O2 Device: None (Room air) Oxygen Delivery: 3 LPM  Vitals:    06/13/17 0351 06/13/17 0757 06/14/17 0707   Weight: 136.1 kg (300 lb) (!) 138.7 kg (305 lb 12.5 oz) (!) 140 kg (308 lb 10.3 oz)     Vital Signs with Ranges  Temp:  [96.4  F (35.8  C)-97.2  F (36.2  C)] 96.4  F (35.8  C)  Pulse:  [74] 74  Heart Rate:  [] 99  Resp:  [16-20] 16  BP: ()/(48-90) 97/50  SpO2:  [84 %-95 %] 84 %  I/O last 3 completed shifts:  In: 240 [P.O.:240]  Out: 1800 [Urine:1800]    GENERAL:  Pleasant, sitting up in chair. No acute distress  HEAD:  NC/AT  EYES: Right eye ecchymoses.   NECK:  Supple  CARDIAC: Regular rate and rhythm.  +S1/S2.  No murmurs, rubs, or gallops.  RESPIRATORY: Diminished bilaterally  GI:  Soft, nontender, nondistended, no rebound or guarding.  Active bowel sounds.  LYMPHATICS:  1+ edema.  Distal pulses palpable.  SKIN: No rashes.    NEURO: CN II-XII intact.     Medications        allopurinol (ZYLOPRIM) tablet 300 mg  300 mg Oral Daily     metoprolol  25 mg Oral Daily     PARoxetine (PAXIL) tablet 10 mg  10 mg Oral Daily     tamsulosin  0.4 mg Oral BID     erythromycin   Right Eye Q8H FERNANDA     cefTRIAXone  2 g Intravenous Q24H     azithromycin  250 mg Oral Q24H     ranitidine  150 mg Oral BID     predniSONE  60 mg Oral Daily     aspirin  81 mg Oral Daily     ipratropium - albuterol 0.5 mg/2.5 mg/3 mL  3 mL Nebulization 4x daily       Data     Recent Labs  Lab 06/14/17  0717 06/13/17  0350   WBC 11.7* 10.0   HGB 12.9* 14.2   MCV 96 96    159   NA  --  137   POTASSIUM  --  4.0   CHLORIDE  --  100   CO2  --  29   BUN  --  15   CR  --  0.82   ANIONGAP  --  8   RAJ  --  8.9   GLC  --  129*       No results found for this or any previous visit (from the past 24 hour(s)).

## 2017-06-16 NOTE — PLAN OF CARE
Problem: Goal Outcome Summary  Goal: Goal Outcome Summary  Discharge Planner PT   Patient plan for discharge: home with wife, home therapy  Current status: Pt requires modA of 2 for sit<>stand from chair to FWW with L knee immobilizer donned. Srinivasa of 2 with FWW and L KI to transfers chair>bed. Srinivasa for sit>supine.   Barriers to return to prior living situation: level of assist required, fatigue and SOB with exertion  Recommendations for discharge: TCU  Rationale for recommendations: Pt is at high risk for falls, requires assist of 2 for some transfers and is unable to walk well enough to access his bathroom at home. Rec TCU to progress mobility before returning home.       Entered by: Stefanie Hartman 06/16/2017 4:30 PM

## 2017-06-16 NOTE — PLAN OF CARE
Problem: Goal Outcome Summary  Goal: Goal Outcome Summary  Outcome: No Change  Pt A/Ox4. VSS on 2L NC. Denies pain. Up A1+walker - pivots to chair. Pt declines repositioning.  Reg diet.  Uses urinal at bedside. Continue to monitor.

## 2017-06-16 NOTE — PLAN OF CARE
Problem: Goal Outcome Summary  Goal: Goal Outcome Summary  Outcome: No Change  Fall precautions maintained. A/O. Denies pain/nausea/vomiting. VSS on 3L NC. LS RLL crackles, LLL fine crackles. BS present, no BM overnight. Refused repositioning. Incontinent x1. Slept throughout night. W/C bound at baseline. Tolerating regular diet. IV SL.      Plan: Continue abx. Continue to monitor.

## 2017-06-16 NOTE — PROGRESS NOTES
Patient has been assessed for Home Oxygen needs. Oxygen readings:    *Pulse oximetry (SpO2) = 88% on room air at rest while awake.    *SpO2 improved to 94% on 3liters/minute at rest.    *SpO2 = 84% on room air during activity/with exercise.    *SpO2 improved to 94% on 3liters/minute during activity/with exercise.

## 2017-06-17 ENCOUNTER — APPOINTMENT (OUTPATIENT)
Dept: PHYSICAL THERAPY | Facility: CLINIC | Age: 77
DRG: 189 | End: 2017-06-17
Payer: COMMERCIAL

## 2017-06-17 ENCOUNTER — DOCUMENTATION ONLY (OUTPATIENT)
Dept: ONCOLOGY | Facility: CLINIC | Age: 77
End: 2017-06-17

## 2017-06-17 VITALS
TEMPERATURE: 96.1 F | BODY MASS INDEX: 41.8 KG/M2 | WEIGHT: 308.64 LBS | HEART RATE: 74 BPM | HEIGHT: 72 IN | OXYGEN SATURATION: 94 % | DIASTOLIC BLOOD PRESSURE: 77 MMHG | RESPIRATION RATE: 20 BRPM | SYSTOLIC BLOOD PRESSURE: 117 MMHG

## 2017-06-17 PROCEDURE — 94640 AIRWAY INHALATION TREATMENT: CPT

## 2017-06-17 PROCEDURE — 40000193 ZZH STATISTIC PT WARD VISIT: Performed by: PHYSICAL THERAPIST

## 2017-06-17 PROCEDURE — 25000125 ZZHC RX 250: Performed by: INTERNAL MEDICINE

## 2017-06-17 PROCEDURE — 94640 AIRWAY INHALATION TREATMENT: CPT | Mod: 76

## 2017-06-17 PROCEDURE — 25000128 H RX IP 250 OP 636: Performed by: INTERNAL MEDICINE

## 2017-06-17 PROCEDURE — 25000132 ZZH RX MED GY IP 250 OP 250 PS 637: Performed by: INTERNAL MEDICINE

## 2017-06-17 PROCEDURE — 99238 HOSP IP/OBS DSCHRG MGMT 30/<: CPT | Performed by: INTERNAL MEDICINE

## 2017-06-17 PROCEDURE — 97530 THERAPEUTIC ACTIVITIES: CPT | Mod: GP | Performed by: PHYSICAL THERAPIST

## 2017-06-17 PROCEDURE — 99207 ZZC CDG-CODE INCORRECT PER BILLING BASED ON TIME: CPT | Performed by: INTERNAL MEDICINE

## 2017-06-17 PROCEDURE — 40000275 ZZH STATISTIC RCP TIME EA 10 MIN

## 2017-06-17 RX ORDER — ALBUTEROL SULFATE 0.83 MG/ML
2.5 SOLUTION RESPIRATORY (INHALATION)
Qty: 360 ML | Refills: 0 | Status: SHIPPED | OUTPATIENT
Start: 2017-06-17 | End: 2020-03-03

## 2017-06-17 RX ORDER — LEVOFLOXACIN 750 MG/1
750 TABLET, FILM COATED ORAL DAILY
Qty: 5 TABLET | Refills: 0 | Status: SHIPPED | OUTPATIENT
Start: 2017-06-17 | End: 2017-06-23

## 2017-06-17 RX ORDER — PREDNISONE 10 MG/1
TABLET ORAL
Qty: 30 TABLET | Refills: 0 | Status: SHIPPED | OUTPATIENT
Start: 2017-06-17 | End: 2017-07-12

## 2017-06-17 RX ORDER — IPRATROPIUM BROMIDE AND ALBUTEROL SULFATE 2.5; .5 MG/3ML; MG/3ML
3 SOLUTION RESPIRATORY (INHALATION) 4 TIMES DAILY
Qty: 120 ML | Refills: 0 | Status: SHIPPED | OUTPATIENT
Start: 2017-06-17 | End: 2017-07-07

## 2017-06-17 RX ADMIN — ASPIRIN 81 MG 81 MG: 81 TABLET ORAL at 08:03

## 2017-06-17 RX ADMIN — PAROXETINE HYDROCHLORIDE 10 MG: 10 TABLET, FILM COATED ORAL at 08:03

## 2017-06-17 RX ADMIN — ALLOPURINOL 300 MG: 300 TABLET ORAL at 08:03

## 2017-06-17 RX ADMIN — PREDNISONE 40 MG: 20 TABLET ORAL at 08:03

## 2017-06-17 RX ADMIN — RANITIDINE 150 MG: 150 TABLET ORAL at 08:03

## 2017-06-17 RX ADMIN — ERYTHROMYCIN: 5 OINTMENT OPHTHALMIC at 08:08

## 2017-06-17 RX ADMIN — TAMSULOSIN HYDROCHLORIDE 0.4 MG: 0.4 CAPSULE ORAL at 08:03

## 2017-06-17 RX ADMIN — IPRATROPIUM BROMIDE AND ALBUTEROL SULFATE 3 ML: .5; 3 SOLUTION RESPIRATORY (INHALATION) at 11:10

## 2017-06-17 RX ADMIN — CEFTRIAXONE 2 G: 2 INJECTION, POWDER, FOR SOLUTION INTRAMUSCULAR; INTRAVENOUS at 07:59

## 2017-06-17 RX ADMIN — AZITHROMYCIN 250 MG: 250 TABLET, FILM COATED ORAL at 08:03

## 2017-06-17 RX ADMIN — ERYTHROMYCIN: 5 OINTMENT OPHTHALMIC at 00:57

## 2017-06-17 RX ADMIN — METOPROLOL TARTRATE 25 MG: 25 TABLET ORAL at 08:03

## 2017-06-17 RX ADMIN — ALBUTEROL SULFATE 2.5 MG: 2.5 SOLUTION RESPIRATORY (INHALATION) at 03:47

## 2017-06-17 NOTE — PROGRESS NOTES
Received intake call for home oxygen at 11:30AM. Reviewed patient's chart; Patient qualifies under Medicare guidelines and all documentation is in the chart including a good order.   11:50 AMCalled to offer choice and patient is okay with [facility] setting them up. Discussed equipment with patient and informed them that we would be to bedside with oxygen in the next 2 hours.   Spoke with care coordinator, Mateo, confirmed we received the order, and provided them with ETA of oxygen and nebulizer.

## 2017-06-17 NOTE — DISCHARGE SUMMARY
DATE OF ADMISSION: 06/13/2017   DATE OF DISCHARGE: 06/17/2017      PRIMARY CARE PROVIDER:  Not given.      DISCHARGE DIAGNOSES:   1.  Chronic obstructive pulmonary disease exacerbation.   2.  Community-acquired pneumonia.   3.  Benign prostatic hypertrophy.   4.  Type 2 diabetes.   5.  Morbid obesity.   6.  Acute hypoxic and hypercapnic respiratory failure due to COPD and community-acquired pneumonia.   7.  Suspect sleep apnea.   8.  Recent right eye surgery.      DISCHARGE MEDICATIONS:   1.  Albuterol 2.5 mg every 2 hours as needed for shortness of breath or dyspnea.   2.  DuoNeb 3 mL 4 times a day.   3.  Prednisone taper 40 mg for 3 days, then 30 mg for 3 days, then 20 mg for 3 days, then 10 mg for 3 days, then stop.   4.  Levaquin 750 mg daily for 5 days.   5.  Norco 2 tabs every 4 hours as needed for moderate to severe pain.   6.  Allopurinol 300 mg daily.   7.  Paroxetine 10 mg daily.   8.  Flomax 0.4 mg twice a day.   9.  Lopressor 25 mg daily.   10.  Aspirin 81 mg daily.      BRIEF HISTORY/HOSPITAL COURSE:  Mr. Ron Gilmore is a 76-year-old gentleman who had recently undergone right eyelid surgery on Friday 06/09.  The patient reported that during that procedure he was told that his oxygen levels were a bit low.       By 06/12, the patient began noticing fevers and worsening shortness of breath.  He presented to the Emergency Department at [facility] on 06/13 and the patient underwent imaging with CT of the chest that showed probable right upper lobe pneumonia and emphysema.  The patient was started on DuoNeb, albuterol and prednisone.  He was also started on ceftriaxone and azithromycin.  During his hospital course his respiratory status has improved in terms of being less short of breath requiring less oxygen.  However, he still does require 2 liters of oxygen by nasal cannula.  During his hospital stay, he did have an apneic episode in the middle of the night where he was noted to have  his oxygen saturations drop into the 88th percentile range.  I suspect the patient does have sleep apnea and would benefit from outpatient sleep study.  The patient reported that he did undergo a sleep study approximately 8 years ago at [facility] and was told he did not have sleep apnea at that time.   I recommend a repeat sleep study as an outpatient.      PHYSICAL EXAMINATION:   GENERAL:  On day discharge the patient is afebrile.   LUNGS:  Clear.   CARDIOVASCULAR:  Regular rate and rhythm, S1, S2.   ABDOMEN:  Bowel sounds positive, nontender, nondistended.   EXTREMITIES:  1+ edema bilaterally.        ASSESSMENT AND PLAN:  At this time, the patient wishes to discharge to home.  We have had multiple evaluations by therapy services and they all recommended TCU; however, after repeated discussions with Mr. Gilmore he has decided he is unwilling to go anywhere except home.  In fact, today he is asking for discharge to home as he does not want to stay another day in the hospital.       I recommended that he at least consider going to a TCU, but he is adamant that he will not do so.  The patient is being set up with home oxygen as well as home RN, PT, OT and home health aide services.  He wishes to discharge before noon today.  He will follow up with his own primary care physician.         EUFEMIA ORTEGA MD             D: 2017 10:46   T: 2017 12:44   MT: [id number]      Name:     SHERI GILMORE   MRN:      [id number]        Account:        [id number]   :      1940           Admit Date:                                       Discharge Date:       Document: [id number]

## 2017-06-17 NOTE — PLAN OF CARE
Problem: Goal Outcome Summary  Goal: Goal Outcome Summary  Outcome: Improving  A/O. Denies pain/nausea. VSS. LS crackles.  BS active and audible, passing gas. Large BM on bedpan, incontinent x 1 small BM. Voiding with good UOP per urinal.  W/C bound at baseline. Tolerating regular diet. IV SL.  Blood and sputum culture pending. Plan: Continue abx. Continue to monitor. Plan to DC with home care today (6/17/17).

## 2017-06-17 NOTE — PROGRESS NOTES
Social Work Progress Note  Pt chart reviewed. Pt discussed in interdisciplinary rounds.     Intervention: Per MD, pt is ready for d/c today. Pt needs new 02 at d/c. Jonathan contacted  DME ([phone]) and spoke with Ron who stated that she will connect with pt, and have 02 and nebulizer delivered within the next two hours. Jonathan also spoke with Yazmin at  Homecare and she confirmed receiving pt's referral for PT/OT/RN. Pt's daughter will transport him at d/c.     Team members notified: Bedside RN, CC, Charge RN, & HUC     Plan:  Anticipated Discharge Placement: Home with  HC   Barriers: None identified at this time.   Follow-up plan:  No further plans to follow. Jonathan available should a need arise.     ARMEN Escalera, [facility]  [phone]

## 2017-06-17 NOTE — PLAN OF CARE
Problem: Goal Outcome Summary  Goal: Goal Outcome Summary  Discharge Planner PT   Patient plan for discharge: home with wife, home therapy  Current status: Pt requires CGA for supine > sit with HOB maximally elevated (states has hospital bed at home); CGA sit <> Stand and to take a few steps from bed > recliner with FWW; no knee buckling noted.  Lengthy discussion regarding continued concerns about discharging home as wife does not seem she will be able to assist pt much (needs w/c for longer distances and has to sit to assist for vincent-cares as it is difficult for her to maintain static standing).  Pt inconsistently reported his feelings about feeling as he is at baseline vs. Weaker from being in hospital.  Did not seem overly receptive to education/concerns regarding discharge home   Barriers to return to prior living situation: level of assist required, fatigue and SOB with exertion  Recommendations for discharge: TCU  Rationale for recommendations: Pt is at high risk for falls, requires assist of 2 for some transfers and is unable to walk well enough to access his bathroom at home. Rec TCU to progress mobility before returning home.       Entered by: Sara Marvin 06/17/2017 1:21 PM       Physical Therapy Discharge Summary    Reason for therapy discharge:    Discharged to home with home therapy.    Progress towards therapy goal(s). See goals on Care Plan [facility] electronic health record for goal details.  Goals not met.  Barriers to achieving goals:   discharge from facility.    Therapy recommendation(s):    Continued therapy is recommended.  Rationale/Recommendations:  strengthening and progression of IND with funcitonal mobility to achieve baseline mobility.

## 2017-06-17 NOTE — PROGRESS NOTES
Pt left on wheelchair to home with wife and [facility]. On 2L O2. Rode home with step daughter. Medication and discharge instructions complete.

## 2017-06-17 NOTE — PLAN OF CARE
Problem: Goal Outcome Summary  Goal: Goal Outcome Summary  Outcome: No Change  VSS.  A/O. Denies pain/nausea. VSS. Lung sounds: crackles.  BS present, no BM.  Use of electric chair at home.  Pt able to ambulate. Tolerating regular diet. IV SL.  Pt on 2LPM O2.  Sputum sample collected and sent to lab.  Plan: Continue abx. Continue to monitor. Plan to DC with home care tomorrow (6/17/17).

## 2017-06-18 LAB
BACTERIA SPEC CULT: NORMAL
MICRO REPORT STATUS: NORMAL
SPECIMEN SOURCE: NORMAL

## 2017-06-19 ENCOUNTER — CARE COORDINATION (OUTPATIENT)
Dept: CASE MANAGEMENT | Facility: CLINIC | Age: 77
End: 2017-06-19

## 2017-06-19 LAB
BACTERIA SPEC CULT: NO GROWTH
BACTERIA SPEC CULT: NO GROWTH
Lab: NORMAL
Lab: NORMAL
MICRO REPORT STATUS: NORMAL
MICRO REPORT STATUS: NORMAL
SPECIMEN SOURCE: NORMAL
SPECIMEN SOURCE: NORMAL

## 2017-06-19 NOTE — PROGRESS NOTES
"Clinic Care Coordination Contact  OUTREACH    Referral Information:  Referral Source: IP/TCU Report  Reason for Contact:   Post hospitalization for COPD exacerbation.  DATE OF ADMISSION: 06/13/2017   DATE OF DISCHARGE:06/17/2017  Recommendation for pt to go to TCU following discharge though pt refused  Care Conference: No     Universal Utilization:   ED Visits in last year: 0  Hospital visits in last year: 1  Last PCP appointment: 05/30/17 (Pre Op for eye surgery)  Missed Appointments: 2  Concerns: Unable to care for own self help needs. Inadequate CG support.  Frequent falls.   Multiple Providers or Specialists:  Oncology    Clinical Concerns:  Current Medical Concerns:  Patient Active Problem List   Diagnosis     SIRS (systemic inflammatory response syndrome) (H)     Vasovagal episode     Pneumonia        Current Behavioral Concerns: Pt seems forgetful    Education Provided to patient: None   Clinical Pathway: None    Medication Management:  Per pt wife is managing and assisting him with his medications.  States he is understanding and adherent to regimen.  Refused med rec today. No side effects reported.    Functional Status:  Mobility Status: Independent w/Device  Equipment Currently Used at Home: power chair, bath bench, grab bar, raised toilet, walker, rolling, oxygen  Transportation: Wife provides all transportation.  ADL's:  Pt is dependant with dressing and bathing and states that his \"wife helps him with this daily\" Wife assists with meals.  Pt states \"wife has her own medical concerns\" as they have a hired  that comes in once weekly.    Hearing and Vision:  Hearing is WNL'S.  Vision is \"good since eye surgery\" Does not wear glasses.  DME:  Provides oxygen and respiratory supplies.       Psychosocial:  Current living arrangement:: I live in a private home with spouse.  Financial/Insurance: [employer].  No concerns per pt.  Safety:  Pt states he has had about \"six falls in the past year\"  " "     Resources and Interventions:  Current Resources: Home Care, DME;    Advanced Care Plans/Directives on file:: No.  Has Healthcare Power Of        Goals:   Patient/Caregiver understanding: Pt states he is currently living in his own home with his wife of nearly 28 years and they are doing \"just fine\"  States he is mainly chairfast and uses his electric scooter to get around.  He is able to ambulate short distances \"like into the bathroom where his scooter does not fit\" with a front wheeled rolling walker independently.  He currently is using 02 continually at 2 liters.  He admits he is SOB \"all the time and has been since he was 8 years old.\"  States he was a smoker but quit in \"1998\"  Wife is his primary caregiver though states stepdaughter provides care as well when she is able as she works full time. Pt denies pain today.  Pt states Home Care nurse was out yesterday and he\" thinks she is coming back on Tuesday\"  States he is being set up with PT/OT as well, adding \"I have had these issues for years I don't know how they think they can fix it now.  Pt states wife is home majority of the day with him but has a cell phone he carries with him 24/7 \"when she goes out\"  Pt has history of bilateral LE's  buckling and states he was \" given a leg brace at the hospital he is planning to use.  Pt denies concerns today.  Frequency of Care Coordination: As Needed  Upcoming appointment:  States \"wife will be making appointment soon\"     Plan:This nurse contacted [facility] to check on status.  Home Care states that they were out to the residence and  pt refused to sign on to services  Will update MD on current status.  Will have SW contact pt and assist with options for additional resources.  "

## 2017-06-19 NOTE — PROGRESS NOTES
Oxygen order received and set up per physician prescription.     Paperwork received, order and equipment (POC)  reviewed with patient on 6/17/201 and follow up call scheduled for 06/20/2017. Out of pocket expenses and potential copay discussed with patient/family.

## 2017-06-20 NOTE — PROGRESS NOTES
Clinic Care Coordination Contact  Care Team Conversations    Spoke with Teja at [facility].  He confirmed that pt is receiving Nurse and PT services.  Will f/u with pt when discharged from .  [facility] # [phone]

## 2017-06-20 NOTE — PROGRESS NOTES
Late Entry  [facility]  Due to a high volume of referrals [facility] has requested this patient's home care episode be transferred to their deferral partner [facility], [facility]. [phone].  Care team and patient notified.

## 2017-06-23 ENCOUNTER — HOSPITAL ENCOUNTER (OUTPATIENT)
Facility: CLINIC | Age: 77
Setting detail: OBSERVATION
Discharge: NURSING, REHAB OR LONG-TERM CARE | End: 2017-06-25
Attending: EMERGENCY MEDICINE | Admitting: INTERNAL MEDICINE
Payer: COMMERCIAL

## 2017-06-23 ENCOUNTER — APPOINTMENT (OUTPATIENT)
Dept: GENERAL RADIOLOGY | Facility: CLINIC | Age: 77
End: 2017-06-23
Attending: EMERGENCY MEDICINE
Payer: COMMERCIAL

## 2017-06-23 DIAGNOSIS — R19.7 DIARRHEA, UNSPECIFIED TYPE: ICD-10-CM

## 2017-06-23 DIAGNOSIS — S92.151A CLOSED DISPLACED AVULSION FRACTURE OF RIGHT TALUS, INITIAL ENCOUNTER: ICD-10-CM

## 2017-06-23 DIAGNOSIS — J44.9 CHRONIC OBSTRUCTIVE PULMONARY DISEASE, UNSPECIFIED COPD TYPE (H): Primary | ICD-10-CM

## 2017-06-23 DIAGNOSIS — S82.402A TIBIA/FIBULA FRACTURE, LEFT, CLOSED, INITIAL ENCOUNTER: ICD-10-CM

## 2017-06-23 DIAGNOSIS — S82.202A TIBIA/FIBULA FRACTURE, LEFT, CLOSED, INITIAL ENCOUNTER: ICD-10-CM
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
## 2017-06-23 PROBLEM — S82.209A TIBIA/FIBULA FRACTURE: Status: ACTIVE | Noted: 2017-06-23

## 2017-06-23 PROBLEM — S82.209A CLOSED TIBIA FRACTURE: Status: ACTIVE | Noted: 2017-06-23

## 2017-06-23 PROBLEM — S82.409A TIBIA/FIBULA FRACTURE: Status: ACTIVE | Noted: 2017-06-23

## 2017-06-23 LAB
ALBUMIN UR-MCNC: NEGATIVE MG/DL
ANION GAP SERPL CALCULATED.3IONS-SCNC: 4 MMOL/L (ref 3–14)
APPEARANCE UR: CLEAR
BILIRUB UR QL STRIP: NEGATIVE
BUN SERPL-MCNC: 24 MG/DL (ref 7–30)
CALCIUM SERPL-MCNC: 8.4 MG/DL (ref 8.5–10.1)
CHLORIDE SERPL-SCNC: 100 MMOL/L (ref 94–109)
CO2 SERPL-SCNC: 31 MMOL/L (ref 20–32)
COLOR UR AUTO: YELLOW
CREAT SERPL-MCNC: 0.92 MG/DL (ref 0.66–1.25)
GFR SERPL CREATININE-BSD FRML MDRD: 81 ML/MIN/1.7M2
GLUCOSE BLDC GLUCOMTR-MCNC: 121 MG/DL (ref 70–99)
GLUCOSE SERPL-MCNC: 152 MG/DL (ref 70–99)
GLUCOSE UR STRIP-MCNC: NEGATIVE MG/DL
HGB BLD-MCNC: 14.6 G/DL (ref 13.3–17.7)
HGB UR QL STRIP: ABNORMAL
KETONES UR STRIP-MCNC: NEGATIVE MG/DL
LEUKOCYTE ESTERASE UR QL STRIP: NEGATIVE
MUCOUS THREADS #/AREA URNS LPF: PRESENT /LPF
NITRATE UR QL: NEGATIVE
PH UR STRIP: 6.5 PH (ref 5–7)
POTASSIUM SERPL-SCNC: 4.4 MMOL/L (ref 3.4–5.3)
RBC #/AREA URNS AUTO: 92 /HPF (ref 0–2)
SODIUM SERPL-SCNC: 135 MMOL/L (ref 133–144)
SP GR UR STRIP: 1.01 (ref 1–1.03)
SQUAMOUS #/AREA URNS AUTO: <1 /HPF (ref 0–1)
URN SPEC COLLECT METH UR: ABNORMAL
UROBILINOGEN UR STRIP-MCNC: NORMAL MG/DL (ref 0–2)
WBC #/AREA URNS AUTO: 2 /HPF (ref 0–2)

## 2017-06-23 PROCEDURE — 25000125 ZZHC RX 250: Performed by: INTERNAL MEDICINE

## 2017-06-23 PROCEDURE — 73630 X-RAY EXAM OF FOOT: CPT | Mod: RT

## 2017-06-23 PROCEDURE — 99220 ZZC INITIAL OBSERVATION CARE,LEVL III: CPT | Performed by: INTERNAL MEDICINE

## 2017-06-23 PROCEDURE — 85018 HEMOGLOBIN: CPT | Performed by: INTERNAL MEDICINE

## 2017-06-23 PROCEDURE — 80048 BASIC METABOLIC PNL TOTAL CA: CPT | Performed by: INTERNAL MEDICINE

## 2017-06-23 PROCEDURE — 99285 EMERGENCY DEPT VISIT HI MDM: CPT | Mod: 25

## 2017-06-23 PROCEDURE — 36415 COLL VENOUS BLD VENIPUNCTURE: CPT | Performed by: INTERNAL MEDICINE

## 2017-06-23 PROCEDURE — 40000275 ZZH STATISTIC RCP TIME EA 10 MIN

## 2017-06-23 PROCEDURE — 00000146 ZZHCL STATISTIC GLUCOSE BY METER IP

## 2017-06-23 PROCEDURE — 73560 X-RAY EXAM OF KNEE 1 OR 2: CPT | Mod: RT

## 2017-06-23 PROCEDURE — 29505 APPLICATION LONG LEG SPLINT: CPT | Mod: LT

## 2017-06-23 PROCEDURE — 25000132 ZZH RX MED GY IP 250 OP 250 PS 637: Performed by: INTERNAL MEDICINE

## 2017-06-23 PROCEDURE — 94640 AIRWAY INHALATION TREATMENT: CPT

## 2017-06-23 PROCEDURE — G0378 HOSPITAL OBSERVATION PER HR: HCPCS

## 2017-06-23 PROCEDURE — 81001 URINALYSIS AUTO W/SCOPE: CPT | Performed by: INTERNAL MEDICINE

## 2017-06-23 RX ORDER — ASPIRIN 81 MG/1
81 TABLET ORAL DAILY
Status: DISCONTINUED | OUTPATIENT
Start: 2017-06-24 | End: 2017-06-25 | Stop reason: HOSPADM

## 2017-06-23 RX ORDER — IPRATROPIUM BROMIDE AND ALBUTEROL SULFATE 2.5; .5 MG/3ML; MG/3ML
3 SOLUTION RESPIRATORY (INHALATION) 4 TIMES DAILY
Status: DISCONTINUED | OUTPATIENT
Start: 2017-06-23 | End: 2017-06-25 | Stop reason: HOSPADM

## 2017-06-23 RX ORDER — TAMSULOSIN HYDROCHLORIDE 0.4 MG/1
0.4 CAPSULE ORAL 2 TIMES DAILY
Status: DISCONTINUED | OUTPATIENT
Start: 2017-06-23 | End: 2017-06-25 | Stop reason: HOSPADM

## 2017-06-23 RX ORDER — NEOMYCIN SULFATE, POLYMYXIN B SULFATE AND DEXAMETHASONE 3.5; 10000; 1 MG/ML; [USP'U]/ML; MG/ML
1 SUSPENSION/ DROPS OPHTHALMIC SEE ADMIN INSTRUCTIONS
COMMUNITY
End: 2017-07-07

## 2017-06-23 RX ORDER — PROCHLORPERAZINE MALEATE 5 MG
5 TABLET ORAL EVERY 6 HOURS PRN
Status: DISCONTINUED | OUTPATIENT
Start: 2017-06-23 | End: 2017-06-25 | Stop reason: HOSPADM

## 2017-06-23 RX ORDER — ONDANSETRON 2 MG/ML
4 INJECTION INTRAMUSCULAR; INTRAVENOUS EVERY 6 HOURS PRN
Status: DISCONTINUED | OUTPATIENT
Start: 2017-06-23 | End: 2017-06-25 | Stop reason: HOSPADM

## 2017-06-23 RX ORDER — BISACODYL 10 MG
10 SUPPOSITORY, RECTAL RECTAL DAILY PRN
Status: DISCONTINUED | OUTPATIENT
Start: 2017-06-23 | End: 2017-06-25 | Stop reason: HOSPADM

## 2017-06-23 RX ORDER — PAROXETINE 10 MG/1
10 TABLET, FILM COATED ORAL DAILY
Status: DISCONTINUED | OUTPATIENT
Start: 2017-06-24 | End: 2017-06-25 | Stop reason: HOSPADM

## 2017-06-23 RX ORDER — ALBUTEROL SULFATE 0.83 MG/ML
2.5 SOLUTION RESPIRATORY (INHALATION)
Status: DISCONTINUED | OUTPATIENT
Start: 2017-06-23 | End: 2017-06-25 | Stop reason: HOSPADM

## 2017-06-23 RX ORDER — ACETAMINOPHEN 325 MG/1
650 TABLET ORAL EVERY 4 HOURS PRN
Status: DISCONTINUED | OUTPATIENT
Start: 2017-06-23 | End: 2017-06-25 | Stop reason: HOSPADM

## 2017-06-23 RX ORDER — PROCHLORPERAZINE 25 MG
12.5 SUPPOSITORY, RECTAL RECTAL EVERY 12 HOURS PRN
Status: DISCONTINUED | OUTPATIENT
Start: 2017-06-23 | End: 2017-06-25 | Stop reason: HOSPADM

## 2017-06-23 RX ORDER — POLYETHYLENE GLYCOL 3350 17 G/17G
17 POWDER, FOR SOLUTION ORAL DAILY PRN
Status: DISCONTINUED | OUTPATIENT
Start: 2017-06-23 | End: 2017-06-25 | Stop reason: HOSPADM

## 2017-06-23 RX ORDER — OXYCODONE HYDROCHLORIDE 5 MG/1
5-10 TABLET ORAL
Status: DISCONTINUED | OUTPATIENT
Start: 2017-06-23 | End: 2017-06-25 | Stop reason: HOSPADM

## 2017-06-23 RX ORDER — ACETAMINOPHEN 650 MG/1
650 SUPPOSITORY RECTAL EVERY 4 HOURS PRN
Status: DISCONTINUED | OUTPATIENT
Start: 2017-06-23 | End: 2017-06-25 | Stop reason: HOSPADM

## 2017-06-23 RX ORDER — ONDANSETRON 4 MG/1
4 TABLET, ORALLY DISINTEGRATING ORAL EVERY 6 HOURS PRN
Status: DISCONTINUED | OUTPATIENT
Start: 2017-06-23 | End: 2017-06-25 | Stop reason: HOSPADM

## 2017-06-23 RX ORDER — NALOXONE HYDROCHLORIDE 0.4 MG/ML
.1-.4 INJECTION, SOLUTION INTRAMUSCULAR; INTRAVENOUS; SUBCUTANEOUS
Status: DISCONTINUED | OUTPATIENT
Start: 2017-06-23 | End: 2017-06-25 | Stop reason: HOSPADM

## 2017-06-23 RX ORDER — AMOXICILLIN 250 MG
1-2 CAPSULE ORAL 2 TIMES DAILY PRN
Status: DISCONTINUED | OUTPATIENT
Start: 2017-06-23 | End: 2017-06-25 | Stop reason: HOSPADM

## 2017-06-23 RX ORDER — HYDROCODONE BITARTRATE AND ACETAMINOPHEN 5; 325 MG/1; MG/1
2 TABLET ORAL EVERY 4 HOURS PRN
Status: DISCONTINUED | OUTPATIENT
Start: 2017-06-23 | End: 2017-06-24

## 2017-06-23 RX ORDER — PREDNISONE 20 MG/1
20 TABLET ORAL DAILY
Status: COMPLETED | OUTPATIENT
Start: 2017-06-24 | End: 2017-06-25

## 2017-06-23 RX ORDER — SULFAMETHOXAZOLE/TRIMETHOPRIM 800-160 MG
1 TABLET ORAL 2 TIMES DAILY
COMMUNITY
Start: 2017-06-22 | End: 2017-07-02

## 2017-06-23 RX ADMIN — IPRATROPIUM BROMIDE AND ALBUTEROL SULFATE 3 ML: .5; 3 SOLUTION RESPIRATORY (INHALATION) at 20:18

## 2017-06-23 RX ADMIN — TAMSULOSIN HYDROCHLORIDE 0.4 MG: 0.4 CAPSULE ORAL at 21:21

## 2017-06-23 ASSESSMENT — ENCOUNTER SYMPTOMS
ABDOMINAL PAIN: 0
DIARRHEA: 0
CHILLS: 0
LIGHT-HEADEDNESS: 0
DYSURIA: 0
NECK PAIN: 0
NUMBNESS: 0
SHORTNESS OF BREATH: 0
COUGH: 0
CONSTIPATION: 0
FEVER: 0
DIZZINESS: 0
ARTHRALGIAS: 1
BACK PAIN: 0
PALPITATIONS: 0
HEADACHES: 0
HEMATURIA: 0
VOMITING: 0
NAUSEA: 0

## 2017-06-23 NOTE — ED NOTES
[facility]  ED Nurse Handoff Report    ED Chief complaint: Knee Pain (missed his chair, legs buckled out to the side, fell down. knee crepitus per ems. c/o severe left knee pain ) and Fall      ED Diagnosis:   Final diagnoses:   Tibia/fibula fracture, left, closed, initial encounter   Closed displaced avulsion fracture of right talus, initial encounter   Fall, initial encounter       Code Status: see epic    Allergies: No Known Allergies    Activity level - Baseline/Home:  Stand with Assist  c walker or power wheelchair     Activity Level - Current:   Total care.  NON WEIGHT BEARING on bilateral LEs. Pt has not gotten up in ED since fall     Needed?: No    Isolation: No  Infection: Not Applicable    Bariatric?: No    Vital Signs:   Vitals:    06/23/17 1541 06/23/17 1734 06/23/17 1737   BP: 100/64 92/63 97/58   Pulse: 84 82 78   Resp: 20 20 20   Temp: 99.2  F (37.3  C)     TempSrc: Oral     SpO2: 90% 96% 95%   Weight: 136.1 kg (300 lb)     Height: 1.829 m (6')         Cardiac Rhythm: ,        Pain level: 0-10 Pain Scale: 0    Is this patient confused?: No    Patient Report: Initial Complaint: pt was attempting to sit down when his left knee buckled out and the pt fell down on top of leg. C/o left knee pain. Pt was recently d/c from [facility] with pneumonia. Uses 2L O2 at home, sating in the mid 90's. BP's running lower, wife states they typically lower in the 100's-110 systolically. Pt also reports falling last week and hurting his right foot.  Focused Assessment: left knee pain c some disformity noted, crepitus noted by EMS. Abrasion and bruising noted to left knee/shin.   Tests Performed: xrays  Abnormal Results: Fracture of the proximal left tibia and fibula; right foot xray: the dorsal distal talus on the lateral view appears to represent a mildly displaced fracture fragment    Treatments provided: splints placed to bilateral LEs    Family Comments: wife at bedside.     OBS brochure/video  discussed/provided to patient: Yes    ED Medications: Medications - No data to display    Drips infusing?:  No      ED NURSE PHONE NUMBER: [phone]

## 2017-06-23 NOTE — IP AVS SNAPSHOT
[address] [facility] [address]    [address] [place] [address]    Phone:  [phone]                                       After Visit Summary   6/23/2017    Ron Gilmore    MRN: [id number]           After Visit Summary Signature Page     I have received my discharge instructions, and my questions have been answered. I have discussed any challenges I see with this plan with the nurse or doctor.    ..........................................................................................................................................  Patient/Patient Representative Signature      ..........................................................................................................................................  Patient Representative Print Name and Relationship to Patient    ..................................................               ................................................  Date                                            Time    ..........................................................................................................................................  Reviewed by Signature/Title    ...................................................              ..............................................  Date                                                            Time

## 2017-06-23 NOTE — IP AVS SNAPSHOT
` `     [facility] ORTHO SPECIALTY UNIT: [phone]                 INTERAGENCY TRANSFER FORM - NOTES (H&P, Discharge Summary, Consults, Procedures, Therapies)   2017                    Hospital Admission Date: 2017  RON GILMORE   : 1940  Sex: Male        Patient PCP Information     Provider PCP Type    Augustin Thomas MD General      History & Physicals     No notes of this type exist for this encounter.         Discharge Summaries      Discharge Summaries by Shan Kilpatrick III, MD at 2017  3:25 PM     Author:  Shan Kilpatrick III, MD Service:  Hospitalist Author Type:  Physician    Filed:  2017  3:31 PM Date of Service:  2017  3:25 PM Creation Time:  2017  3:25 PM    Status:  Signed :  Shan Kilpatrick III, MD (Physician)         [facility]    Discharge Summary  Hospitalist    Date of Admission:  2017  Date of Discharge:[GH1.1]  2017[GH1.2]  Discharging Provider: Shan Kilpatrick III, MD    Code Status   Full Code       Primary Care Physician   Augustin Thomas    Consultations This Hospital Stay   SOCIAL WORK IP CONSULT  PHYSICAL THERAPY ADULT IP CONSULT  ORTHOPEDIC SURGERY IP CONSULT  PHYSICAL THERAPY ADULT IP CONSULT  ORTHOSIS EXTREMITY LOWER REFERRAL IP CONSULT  SOCIAL WORK IP CONSULT  CARE COORDINATOR IP CONSULT  PHYSICAL THERAPY ADULT IP CONSULT  OCCUPATIONAL THERAPY ADULT IP CONSULT    Discharge Disposition   Discharged to short-term care facility  Condition at discharge: Stable    Discharge Diagnoses   Active Problems:    Tibia/fibula fracture    Closed tibia fracture    Fall      History of Present Illness   Ron Gilmore is an 76 year old male who presented on 2017 after a fall. He has a PMH of Type 2 DM and COPD among others. He was recently discharged from this facility after treatment for a COPD exacerbation. At that time he apparently refused TCU placement and elected for  discharge home. He was at home ambulating with a walker when one of his legs gave out, a chronic problem since his stroke 9 years ago, and he fell.    He underwent x-ray of his right knee which demonstrated mildly displaced transverse fracture of the proximal left tibial shaft and proximal fibular shaft at the same level.  He was seen by Orthopedic Surgery who recommended nonsurgical management with a knee immobilizer and nonweightbearing status for a few days.     He also noted that he had been having some ongoing pain in his right foot since a previous fall and a right foot x-ray was obtained, which demonstrated likely a mildly displaced fracture fragment of the dorsal distal talus.  This was casted in the emergency department and given the patient's bilateral fractures, he was admitted for observation.    Finally, he noted a recent history of very dark urine. His PCP prescribed him Bactrim for this which he began taking prior to his arrival here    Hospital Course   Orthopedics followed up with him after admission and confirmed their previous recommendations. A knee immobilizer was placed and discharge arrangements made for him to go to TCU where he will work with PT and OT.    Physical Exam   Temp: 98.2  F (36.8  C) Temp src: Oral BP: 100/67 Pulse: 88 Heart Rate: 57 Resp: 16 SpO2: 95 % O2 Device: Nasal cannula Oxygen Delivery: 2 LPM  Vitals:    06/23/17 1541   Weight: 136.1 kg (300 lb)     Vital Signs with Ranges  Temp:  [98.1  F (36.7  C)-99.2  F (37.3  C)] 98.2  F (36.8  C)  Pulse:  [] 88  Heart Rate:  [] 57  Resp:  [16-20] 16  BP: ()/(58-74) 100/67  SpO2:  [90 %-98 %] 95 %  I/O last 3 completed shifts:  In: 780 [P.O.:780]  Out: 700 [Urine:700]    Constitutional: Comfortable, well-developed. Both right and left leg casted/immobilized  Eyes: Anicteric, no conjunctival injection  ENT: Atraumatic, membranes moist   Neck: Supple, trachea midline  Respiratory: No wheeze or crackles. Breathing  comfortably on room air  Cardiovascular: S1S2, no edema  GI: Abdomen soft, non-tender  Skin: Warm, pink, dry  Neurologic: Alert and oriented. CN II - XII grossly intact  Psychiatric: Pleasant, cooperative        Discharge Orders     POST-OP FOLLOW-UP VISIT   Re ck in  3  Weeks in  [place]  Office  Or  [facility]  office     General info for SNF   Length of Stay Estimate: Short Term Care:   Estimate  For  7  days  Condition at Discharge: Stable  Level of care:skilled   Rehabilitation Potential: Fair  Admission H&P remains valid and up-to-date: Yes  Recent Chemotherapy: N/A  Use Nursing Home Standing Orders: Yes     Activity - Up ad osmany     Activity - Up with assistive device     Activity - Ambulate in hallway   Every shift     Weight bearing status   wbat    No  rom  Of  Knee     General info for SNF   Length of Stay Estimate: Short Term Care: Estimated # of Days <30  Condition at Discharge: Stable  Level of care:skilled   Rehabilitation Potential: Excellent  Admission H&P remains valid and up-to-date: Yes  Recent Chemotherapy: N/A  Use Nursing Home Standing Orders: Yes     Mantoux instructions   Give two-step Mantoux (PPD) Per Facility Policy Yes     Weight bearing status   Non weight-bearing for 3 days, afterwards can progressively increase weight bearing as tolerated     Reason for your hospital stay   Mr. Gilmore came to us after a fall (See H+P). He suffered a mildly displaced proximal left tibial and fibular fracture, which orthopedics has recommended managing conservatively with a knee immobilizer. He was also found to have a mildly displaced fracture fragment of the dorsal distal talus from a prior fall, and this was casted in the emergency department. Given his bilateral leg immobilization at present, he will require skilled care and ongoing PT/OT at the TCU.     Full Code     Physical Therapy Adult Consult   Evaluate and treat as clinically indicated.    Reason:  wbat    No rom  Of the   Knee  On  fx  side      Physical Therapy Adult Consult   Evaluate and treat as clinically indicated.    Reason:  Fall, history of stroke     Occupational Therapy Adult Consult   Evaluate and treat as clinically indicated.    Reason:  History of stroke, already working with OT at home     Fall precautions       Discharge Medications   Current Discharge Medication List      START taking these medications    Details   oxyCODONE (ROXICODONE) 5 MG IR tablet Every  4  To  6  Hrs  5  -  10  mg  Qty: 80 tablet, Refills: 0    Associated Diagnoses: Tibia/fibula fracture, left, closed, initial encounter         CONTINUE these medications which have NOT CHANGED    Details   sulfamethoxazole-trimethoprim (BACTRIM DS/SEPTRA DS) 800-160 MG per tablet Take 1 tablet by mouth 2 times daily For 10 days      neomycin-polymyxin-dexamethasone (MAXITROL) 3.5-53052-5.1 SUSP ophthalmic susp Place 1 drop into the right eye See Admin Instructions Started 6/22/2017. 1 drop four times a day for 4 days, 1 drop three times a day for 3 days, 1 drop two times a day for 2 days, 1 drop once daily for 1 day and then stop      albuterol (2.5 MG/3ML) 0.083% neb solution Take 1 vial (2.5 mg) by nebulization every 2 hours as needed for shortness of breath / dyspnea or other (dyspnea)  Qty: 360 mL, Refills: 0    Associated Diagnoses: COPD exacerbation (H)      ipratropium - albuterol 0.5 mg/2.5 mg/3 mL (DUONEB) 0.5-2.5 (3) MG/3ML neb solution Take 1 vial (3 mLs) by nebulization 4 times daily  Qty: 120 mL, Refills: 0    Associated Diagnoses: COPD exacerbation (H)      predniSONE (DELTASONE) 10 MG tablet 4 tabs daily for 3 days, then 3 tabs daily for 3 days, then 2 tabs daily for 3 days, then 1 tab daily for 3 days, then stop  Qty: 30 tablet, Refills: 0    Associated Diagnoses: COPD exacerbation (H)      ALLOPURINOL PO Take 300 mg by mouth daily      PARoxetine HCl (PAXIL PO) Take 10 mg by mouth daily      tamsulosin (FLOMAX) 0.4 MG 24 hr capsule Take 0.4 mg by mouth 2 times  daily      metoprolol (LOPRESSOR) 25 MG tablet Take 12.5 mg by mouth 2 times daily 1/2 25mg tablet= 12.5mg  Qty: 180 tablet, Refills: 3      BABY ASPIRIN 81 MG OR CHEW 1 TABLET DAILY      order for DME Equipment being ordered: Other: Home nebulizer  Treatment Diagnosis: COPD/Pneumonia  Qty: 1 Device, Refills: 0    Associated Diagnoses: COPD exacerbation (H)         STOP taking these medications       HYDROcodone-acetaminophen (NORCO) 5-325 MG per tablet Comments:   Reason for Stopping:             Allergies   No Known Allergies      Data[GH1.1]   Results for orders placed or performed during the hospital encounter of 06/23/17   XR Knee Right 1/2 Views    Narrative    KNEE RIGHT ONE TO TWO VIEWS    6/23/2017 4:56 PM     HISTORY: Fall, pain    COMPARISON: None.    FINDINGS: Mildly displaced transverse fracture of the proximal left  tibial shaft and proximal fibular shaft at the same level.  Hypertrophic changes with medial compartmental narrowing of the left  knee. Arterial calcification posterior to the knee. Fluid in the  suprapatellar bursa.      Impression    IMPRESSION: Fracture of the proximal left tibia and fibula.    RUDDY LENNON MD   Foot  XR, G/E 3 views, right    Narrative    XR FOOT RT G/E 3 VW  6/23/2017 5:29 PM    HISTORY:  pain right foot    COMPARISON:  None.      Impression    IMPRESSION:  Diffuse osteopenia. Flexion at the IP joints of the  middle toe, very poorly visualized. Hallux valgus without significant  bunion formation. Degenerative changes at the first MTP joint. Osseous  density off the dorsal distal talus on the lateral view appears to  represent a mildly displaced fracture fragment, age indeterminate.  Correlate clinically.    KRYSTAL RAMÍREZ MD[GH1.3]       Time Spent on this Encounter   I, Shan Kilpatrick III, personally saw the patient today and spent greater than 30 minutes discharging this patient.    Shan Kilpatrick III, MD[GH1.1]     Revision History        User Key  Date/Time User Provider Type Action    > GH1.3 6/24/2017  3:31 PM Shan Kilpatrick III, MD Physician Sign     GH1.2 6/24/2017  3:26 PM Shan Kilpatrick III, MD Physician      GH1.1 6/24/2017  3:25 PM Shan Kilpatrick III, MD Physician                      Consult Notes      Consults by Gladys Oconnor RN at 6/24/2017  8:28 AM     Author:  Gladys Oconnor RN Service:  Care Coordinator Author Type:      Filed:  6/24/2017  8:38 AM Date of Service:  6/24/2017  8:28 AM Creation Time:  6/24/2017  8:24 AM    Status:  Signed :  Gladys Oconnor RN ()     Consult Orders:    1. Care Coordinator IP Consult [[id number]] ordered by Charan Verduzco MD at 06/24/17 0824                Care Transition Initial Assessment - RN        Met with: Patient.[JB1.1] Discussed observation status based on order. Observation Brochure was given to the patient. He stated he is ready to go to TCU today if cleared by medical MD.  He is likely still in his medicare window as he had a 3 day stay from 6/13-6/17, so TCU likely covered by his [facility].  He stated his first choice of TCU is [facility] as his wife can get there easier, but if unavailable, he would like to go to [place] on [facility].  He would like transportation set up if accepted at [facility], and understands that he will be billed for this and accepts. Will update social work.    Gladys Oconnor RN[JB1.2]      DATA   Active Problems:    Tibia/fibula fracture    Closed tibia fracture       Cognitive Status: awake, alert and oriented.        Contact information and PCP information verified: Yes                           Insurance concerns: No Insurance issues identified    ASSESSMENT  Patient currently receives the following services:  Home w/C        Identified issues/concerns regarding health management: ready to follow recommendations for TCU, so none current    PLAN  Financial costs for the patient include copay for hospital  adm according to pt .  Patient given options and choices for discharge yes, TCU .  Patient/family is agreeable to the plan?  Yes  Patient anticipates discharging today to [facility] if medical doctor agrees and if bed available at TCU .        Patient anticipates needs for home equipment: Does not know  Discharge Planner   Discharge Plans in progress: TCU  Barriers to discharge plan: await MD visit, TCU availability  Follow up plan: await medical clearance       Entered by: Gladys Oconnor 06/24/2017 8:24 AM           Plan/Disposition: TCU   Appointments:[JB1.1] TBD[JB1.2]    Care  (CTS) will continue to follow as needed.[JB1.1]       Revision History        User Key Date/Time User Provider Type Action    > JB1.2 6/24/2017  8:38 AM Gladys Oconnor RN Case Manager Sign     JB1.1 6/24/2017  8:24 AM Gladys Oconnor RN Case Manager             Consults signed by Charan Verduzco MD at 6/23/2017 10:46 PM      Author:  Charan Verduzco MD Service:  Surgery Author Type:  Physician    Filed:  6/23/2017 10:46 PM Date of Service:  6/23/2017  5:24 PM Creation Time:  6/23/2017  5:55 PM    Status:  Signed :  Charan Verduzco MD (Physician)         REQUESTING PHYSICIAN:  Nicole Ibrahim MD      HISTORY OF PRESENT ILLNESS:  Ron Gilmore had a mechanical fall by history.  Sustained an injury to his left lower extremity.  He asked me to look at his right foot.  He was in last week after a fall.  He was admitted for pneumonia.  His foot has been hurting since the fall.  Denies frequent falls.  He is extremely inactive and disheveled.  His personal history is positive for stroke, hyperlipidemia, diabetes, hypertension.  Today with acute left knee pain and crepitus.  He was utilizing his walker, fell trying to get to his power chair, fell with left leg under him onto his butt.  Occurred about 2:30 in the afternoon.  His OT person was there at that time.      In the Emergency Department, he  is having some right foot pain as well.  He related that to last week.  Reviewed the old records, being treated for pneumonia.  He says he is on oxygen from that and he does not require oxygen normally he states.      At this point in time, reviewed the 06/13 discharge medication list, etc.      PHYSICAL EXAMINATION:  A 2-3 cm longitudinal abrasion anterior aspect of the left knee area.  Quads, patellar tendon are intact.  He is tender at the proximal tibia area.  Peroneal nerve, deep and superficial, is intact, as are edges of tibial nerves.      Opposite knee for comparison is negative.  Left knee itself shows no bruising at this point.  Hip moves well without referred pain.  Left ankle nontender.  Dorsiflexion, plantarflexion are intact.  Does not have pain with passive flexion or extension of the toes.  Right foot ecchymotic, plantarflexed foot.  Pedal edema moderate.  Very tender MTP joints, 2, 3, 4.  There is no malrotation or severe angulation of the toes.  Everyone of the distal first 4 toes are very ecchymotic.  Plantar-wise, there is some as well.  Lisfranc joints are nontender.  Ankle joint nontender.      IMAGING:  X-rays are pending in the foot.  His tibia shows metaphyseal fracture, proximal third/middle third junction and fibula, as well other intact cortices.  No severe angulation, no displacement.      ASSESSMENT AND PLAN:  This patient is very disheveled appearing.  I suspect he is low demand, low activity.  I would recommend an x-ray of that right foot.  If there are some fractures there, Cam boot would be fine.  In regards to left leg, well-padded immobilizer.  He will be kept nonweightbearing.  If it gets to be unreliable or displaces it, then it may become a surgical problem, but right now, I think it is best treated nonsurgically.      My own gut feeling, I would bring him in the hospital, obs care, get him into a nursing facility, especially if there is an injury documented on the right foot.       The patient agrees with this approach.  I will be happy to see him in the morning if he is still in the hospital.         CHARAN ZAPATA MD             D: 2017 17:24   T: 2017 17:54   MT: TS      Name:     SHERI THORPE   MRN:      [id number]        Account:       [id number]   :      1940           Consult Date:  2017      Document: [id number]       cc: Nicole Thomas MD[JT1.1]        Revision History        User Key Date/Time User Provider Type Action    > JT1.1 2017 10:46 PM Charan Zapata MD Physician Sign                     Progress Notes - Physician (Notes from 17 through 17)      Progress Notes by Charan Zapata MD at 2017  7:47 AM     Author:  Charan Zapata MD Service:  Orthopedics Author Type:  Physician    Filed:  2017  7:47 AM Date of Service:  2017  7:47 AM Creation Time:  2017  7:47 AM    Status:  Signed :  Charan Zapata MD (Physician)         D/c  Today  planned[JT1.1]     Revision History        User Key Date/Time User Provider Type Action    > JT1.1 2017  7:47 AM Charan Zapata MD Physician Sign            ED Notes signed by Wilfrid Non-Provider at 2017  5:45 PM      Author:  Scan, Non-Provider Service:  (none) Author Type:  (none)    Filed:  2017  5:45 PM Date of Service:  2017 10:38 PM Creation Time:  2017  5:45 PM    Status:  Signed :  Wilfrid Non-Provider     Scan on 2017  5:45 PM by Scan Non-Provider : [facility]          Revision History        User Key Date/Time User Provider Type Action    > [N/A] 2017  5:45 PM Scan, Non-Provider (none) Sign            Progress Notes by Shan Kilpatrick III, MD at 2017  4:21 PM     Author:  Shan Kilpatrick III, MD Service:  Hospitalist Author Type:  Physician    Filed:  2017  4:26 PM Date of Service:  2017  4:21 PM Creation Time:  2017  4:21 PM     Status:  Signed :  Shan Kilpatrick III, MD (Physician)         [facility]    Hospitalist Progress Note    Date of Service:[GH1.1] 06/24/2017    Assessment & Plan[GH1.2]   Ron Gilmore is an 76 year old male who presented on 6/23/2017 after a fall. He has a PMH of Type 2 DM and COPD among others. He was recently discharged from this facility after treatment for a COPD exacerbation. At that time he apparently refused TCU placement and elected for discharge home. He was at home ambulating with a walker when one of his legs gave out, a chronic problem since his stroke 9 years ago, and he fell.     He underwent x-ray of his right knee which demonstrated mildly displaced transverse fracture of the proximal left tibial shaft and proximal fibular shaft at the same level.  He was seen by Orthopedic Surgery who recommended nonsurgical management with a knee immobilizer and nonweightbearing status for a few days.      He also noted that he had been having some ongoing pain in his right foot since a previous fall and a right foot x-ray was obtained, which demonstrated a mildly displaced fracture fragment of the dorsal distal talus.  This was casted in the emergency department and given the patient's bilateral fractures, he was admitted for observation.     Finally, he noted a recent history of very dark urine. His PCP prescribed him Bactrim for this which he began taking prior to his arrival here    1. Mildly displaced transverse fracture of the proximal left tibial shaft and proximal fibular shaft  - Maintain knee immobilizer  - Non-weight bearing for 3 days, then WBAT  - PT/OT  - Follow up in office with orthopedics    2. Mildly displaced fracture fragment of the dorsal distal talus  - Casted in ED  - WBAT    3. Hematuria  - Started on bactrim by PCP  - UA here shows RBCs but no WBCs, however was already on antibiotics  - Would complete the course of bactrim given by his PCP. If he still has  hematuria after that, however, he should see a urologist for o/p cysto    DVT Prophylaxis: Enoxaparin (Lovenox) SQ  Code Status:[GH1.1] Prior[GH1.2]    Disposition: Expected discharge in 1 days once bed available at TCU.[GH1.1]    Shan Kilpatrick III[GH1.2], MD  [phone] (C)  [phone] (P)  Text Page (7am to 6pm)  Please call or text with any questions or concerns.[GH1.1]    Interval History[GH1.2]   Feeling fine today. Was for discharge but delayed for placement issues. He has no complains except frustration about his immobility    -Data reviewed today: I reviewed all new labs and imaging results over the last 24 hours. I personally reviewed no images or EKG's today.[GH1.1]    Physical Exam   Temp: 98.8  F (37.1  C) Temp src: Oral BP: 99/63 Pulse: 91 Heart Rate: 57 Resp: 16 SpO2: 96 % O2 Device: Nasal cannula Oxygen Delivery: 2 LPM  Vitals:    06/23/17 1541   Weight: 136.1 kg (300 lb)[GH1.2]     Vital Signs with Ranges[GH1.1]  Temp:  [98.1  F (36.7  C)-99  F (37.2  C)] 98.8  F (37.1  C)  Pulse:  [] 91  Heart Rate:  [] 57  Resp:  [16-20] 16  BP: ()/(58-74) 99/63  SpO2:  [93 %-98 %] 96 %  I/O last 3 completed shifts:  In: 780 [P.O.:780]  Out: 700 [Urine:700][GH1.2]    Constitutional: Awake, alert, cooperative, no apparent distress  Respiratory: Clear to auscultation bilaterally, no crackles or wheezing  Cardiovascular: Regular rate and rhythm, normal S1 and S2, and no murmur noted  GI: Normal bowel sounds, soft, non-distended, non-tender  Skin/Integumentary: No rashes, no cyanosis, no edema  Other: Both legs immobilized[GH1.1]    Medications        neomycin-polymyxin-dexamethasone  1 drop Right Eye See Admin Instructions     ipratropium - albuterol 0.5 mg/2.5 mg/3 mL  3 mL Nebulization 4x Daily     metoprolol  12.5 mg Oral BID     PARoxetine (PAXIL) tablet 10 mg  10 mg Oral Daily     predniSONE  20 mg Oral Daily     tamsulosin  0.4 mg Oral BID     aspirin EC  81 mg Oral Daily       Data      Recent Labs  Lab 06/23/17  2100   HGB 14.6      POTASSIUM 4.4   CHLORIDE 100   CO2 31   BUN 24   CR 0.92   ANIONGAP 4   RAJ 8.4*   *[GH1.2]       Imaging:[GH1.1]  Recent Results (from the past 24 hour(s))   XR Knee Right 1/2 Views    Narrative    KNEE RIGHT ONE TO TWO VIEWS    6/23/2017 4:56 PM     HISTORY: Fall, pain    COMPARISON: None.    FINDINGS: Mildly displaced transverse fracture of the proximal left  tibial shaft and proximal fibular shaft at the same level.  Hypertrophic changes with medial compartmental narrowing of the left  knee. Arterial calcification posterior to the knee. Fluid in the  suprapatellar bursa.      Impression    IMPRESSION: Fracture of the proximal left tibia and fibula.    RUDDY LENNON MD   Foot  XR, G/E 3 views, right    Narrative    XR FOOT RT G/E 3 VW  6/23/2017 5:29 PM    HISTORY:  pain right foot    COMPARISON:  None.      Impression    IMPRESSION:  Diffuse osteopenia. Flexion at the IP joints of the  middle toe, very poorly visualized. Hallux valgus without significant  bunion formation. Degenerative changes at the first MTP joint. Osseous  density off the dorsal distal talus on the lateral view appears to  represent a mildly displaced fracture fragment, age indeterminate.  Correlate clinically.    KRYSTAL RAMÍREZ MD[GH1.2]        Revision History        User Key Date/Time User Provider Type Action    > GH1.2 6/24/2017  4:26 PM Shan Kilpatrick III, MD Physician Sign     GH1.1 6/24/2017  4:21 PM Shan Kilpatrick III, MD Physician             Progress Notes by Sandy Cramer MSW at 6/24/2017  2:10 PM     Author:  Sandy Cramer MSW Service:  (none) Author Type:      Filed:  6/24/2017  2:54 PM Date of Service:  6/24/2017  2:10 PM Creation Time:  6/24/2017  2:10 PM    Status:  Addendum :  Sandy Cramer MSW ()         SW    Spoke with [place] regarding the referral status,  [facility] had a few questions regarding the pt's weight bearing status. Notes indicate that pt should be NWB for a couple days and then can be WBAT. TCU would like a actual date that pt will be able to be WBAT. Also relayed the information that MD would like the facility to use a [facility] life with the pt.  Cecilia still assessing the pt.[JE1.1]    14:45  [facility] is able to take the pt for admission Sunday 6/25/17. Per RN will need to be NWB for 3 days then WBAT after that with the use of a [facility]. Updated RN regarding DC for Sunday.[JE1.2] Transport arranged via [facility] at 12:00. Pt will need portable O2 for transport and completed PAS[JE1.3]      ARMEN Tiwari, CRISTINASW[JE1.1]       Revision History        User Key Date/Time User Provider Type Action    > JE1.3 6/24/2017  2:54 PM Sandy Cramer, ARMEN  Addend     JE1.2 6/24/2017  2:46 PM Sandy Cramer, ARMEN  Addend     JE1.1 6/24/2017  2:12 PM Sandy Cramer, MSW  Sign            Progress Notes by Rg Jarquin at 6/24/2017 12:44 PM     Author:  Rg Jarquin Service:  (none) Author Type:  Orthotist    Filed:  6/24/2017 12:44 PM Date of Service:  6/24/2017 12:44 PM Creation Time:  6/24/2017 12:44 PM    Status:  Signed :  Rg Jarquin (Orthotist)         S: Pt seen bedside [facility] room 509 with reports of tibia Fx and pain when he tries to move his leg. O: I see the EPIC order for hinged KO due to Tib Fx Left. A: I applied the Breg T-scope hinged knee brace and instructed patient on locking and unlocking with the red slide button at the knee. He seemed to understand. He reports he will not be weight bearing on that leg. He reports going to a transitional care unit. I also left him the manufacturers instruction sheet in case of swelling or volume change in his limb. P: FU PRN.[EK1.1]      Revision History        User Key Date/Time User Provider Type Action    > EK1.1 6/24/2017 12:44  Rg Patino Orthotist Sign            Progress Notes by Gladys Oconnor RN at 6/24/2017  8:48 AM     Author:  Gladys Oconnor RN Service:  Care Coordinator Author Type:      Filed:  6/24/2017 10:51 AM Date of Service:  6/24/2017  8:48 AM Creation Time:  6/24/2017  8:48 AM    Status:  Addendum :  Gladys Oconnor RN ()         Calling orthotics to get the hinged brace ordered by , awaiting answer. Gladys Oconnor RN[JB1.1]    Addendum- late entry: orthotics said they will be by shortly for brace. (6/24/17 @ 9:30am)[JB1.2]     Revision History        User Key Date/Time User Provider Type Action    > JB1.2 6/24/2017 10:51 AM Gladys Oconnor RN Case Manager Addend     JB1.1 6/24/2017  8:49 AM Gladys Oconnor RN Case Manager Sign            Progress Notes by Rose Rizzo LICSW at 6/24/2017 10:28 AM     Author:  Rose Rizzo LICSW Service:  Social Work Author Type:      Filed:  6/24/2017 10:43 AM Date of Service:  6/24/2017 10:28 AM Creation Time:  6/24/2017 10:28 AM    Status:  Signed :  Rose Rizzo LICSW ()         Care Transition Initial Assessment -   Reason For Consult: discharge planning  Met with: Patient    Active Problems:    Tibia/fibula fracture    Closed tibia fracture         DATA  Lives With: spouse  Living Arrangements: condominium  Description of Support System: Supportive, Involved  Who is your support system?: Wife  Support Assessment: Adequate family and caregiver support.   Identified issues/concerns regarding health management:       Quality Of Family Relationships: supportive, involved     Per social service protocol for discharge planning.  Patient was admitted on 6-23-17 after a fall resulting in a tibia/fibula fracture.  The tentative date of discharge is 6-24-17 or 6-25-17.  Patient is admitted under observation status.  Patient has had a recent inpatient hospital stay from June 13-June 17.   Reviewed chart.  Patient lives with his wife in a condo  Patient uses a rolling walker or a power chair at baseline depending on the distance.  Patient has grab bars, and a raised toilet seat in the bathroom.  Patient also has a hospital bed and a lift chair.  Patient has home oxygen at 2 liters from [facility].  Patient has a new home nebulizer machine.  Patient was open to homecare services from RN/PT/OT from [facility].  Patient is in agreement to go to tcu on discharge and he would prefer to go to [facility].  The second choice would be [facility].  Call placed to [facility] to check bed availability.  Per Stefanie, they are asking for a referral to be sent.   Patient would like transport arranged when discharge is known.  Patient understands that this will be private pay.      ASSESSMENT  Cognitive Status:  awake and alert  Concerns to be addressed: discharge planning.     PLAN  Financial costs for the patient includes Transportation costs.  Patient has a recent inpatient hospital stay therefore tcu would be covered.  Patient given options and choices for discharge tcu choices.  Patient/family is agreeable to the plan?  Yes  Patient Goals and Preferences: tcu on discharge.  Patient anticipates discharging to:  TCU.  Discharge Planner   Discharge Plans in progress: yes  Barriers to discharge plan: none  Follow up plan: need to confirm a bed    Will confirm a bed, continue to follow, and assist with a safe discharge plan.[SJ1.1]    Rsoe Rizzo[SJ1.2], [facility], [facility]  [phone]       Entered by: Rose Rizzo 06/24/2017 10:28 AM[SJ1.1]            Revision History        User Key Date/Time User Provider Type Action    > SJ1.2 6/24/2017 10:43 AM Rose Rizzo LICSW  Sign     SJ1.1 6/24/2017 10:28 AM Rose Rizzo LICSW              Progress Notes by Charan Verduzco MD at 6/24/2017 10:09 AM     Author:  Charan Verduzco MD Service:  Orthopedics Author  Type:  Physician    Filed:  6/24/2017 10:14 AM Date of Service:  6/24/2017 10:09 AM Creation Time:  6/24/2017 10:09 AM    Status:  Addendum :  Charan Verduzco MD (Physician)         Right  Foot  2 week old  Contusions   No restrictions   Can  D/c  Aces and  Cotton  Padding  As  Needed  Left tibia  fx    Hinged  Knee brace  Today nohemy in [employer]   On  Full time  camn  Go  To tcu  Anytime[JT1.1]  ,[JT1.2]  Tomorrow  Seems  Reasonable   Will need  To  Go  Where there is aleks lift  Capacity  As he is  Very  obese[JT1.1]     Revision History        User Key Date/Time User Provider Type Action    > JT1.2 6/24/2017 10:14 AM Charan Verduzco MD Physician Addend     JT1.1 6/24/2017 10:11 AM Charan Verduzco MD Physician Sign            Progress Notes by Gladys Oconnor RN at 6/24/2017  9:05 AM     Author:  Gladys Oconnor RN Service:  Care Coordinator Author Type:      Filed:  6/24/2017  9:06 AM Date of Service:  6/24/2017  9:05 AM Creation Time:  6/24/2017  9:05 AM    Status:  Signed :  Gladys Oconnor RN ()         Spoke to Dr. Velez who says he is medically cleared to discharge to TCU today, await orders. Gladys Oconnor RN[JB1.1]     Revision History        User Key Date/Time User Provider Type Action    > JB1.1 6/24/2017  9:06 AM Gladys Oconnor RN Case Manager Sign            Progress Notes by Charan Verduzco MD at 6/24/2017  5:55 AM     Author:  Charan Verduzco MD Service:  Orthopedics Author Type:  Physician    Filed:  6/24/2017  5:56 AM Date of Service:  6/24/2017  5:55 AM Creation Time:  6/24/2017  5:55 AM    Status:  Signed :  Charan Verduzco MD (Physician)         Ortho    Re ck     Right  Foot  Films  Neg  For  fx     Right tibia  fx  Non  Displaced       jose  Hold  Off wt  Bearing  For  Few  Days  Then  wbat     Will have  A brace appilied       Can  Be  D/c  To  Tcu  Anytime[JT1.1]        Revision History        User Key Date/Time User Provider  Type Action    > JT1.1 6/24/2017  5:56 AM Charan Verduzco MD Physician Sign            ED Provider Notes by Nicole Ibrahim MD at 6/23/2017  3:33 PM     Author:  Nicole Ibrahim MD Service:  Emergency Medicine Author Type:  Physician    Filed:  6/24/2017  1:00 AM Date of Service:  6/23/2017  3:33 PM Creation Time:  6/23/2017  4:15 PM    Status:  Signed :  Nicole Ibrahim MD (Physician)           History   Chief Complaint:  Knee Pain    HPI   Ron Gilmore is a 76 year old male with a history of stroke, hypertension, hyperlipidemia and diabetes mellitus who presents[BR1.1] via EMS[BR1.2] with[BR1.1] lateral left knee pain. EMS reports knee crepitus. The patient reports he was using his walker and fell trying to get to his power chair, fell with left leg under him and fell on his buttocks, around 1430 and his occupational therapist was there for the first time. He reports his leg went out on him and he denies hitting his head. He reports some numbness around the left knee.[BR1.2] While in the ED the patient reports he is having some right foot pain to.[BR1.3] He denies nausea, vomiting, diarrhea, dysuria, hematuria, syncope or chest pain. He denies neck pain or shortness of breath. He denies tingling or numbness below the left knee or in the right leg. He[BR1.2] declines an offer of[AC1.1] pain medication in the ED. Of note, the patient reports his tetanus is up to date.     6/13/2017  DISCHARGE DIAGNOSES:   1.  Chronic obstructive pulmonary disease exacerbation.   2.  Community-acquired pneumonia.   3.  Benign prostatic hypertrophy.   4.  Type 2 diabetes.   5.  Morbid obesity.   6.  Acute hypoxic and hypercapnic respiratory failure due to COPD and community-acquired pneumonia.   7.  Suspect sleep apnea.   8.  Recent right eye surgery.       DISCHARGE MEDICATIONS:   1.  Albuterol 2.5 mg every 2 hours as needed for shortness of breath or dyspnea.   2.  DuoNeb 3 mL 4 times a day.   3.  Prednisone taper 40 mg for 3 days,  then 30 mg for 3 days, then 20 mg for 3 days, then 10 mg for 3 days, then stop.   4.  Levaquin 750 mg daily for 5 days.   5.  Norco 2 tabs every 4 hours as needed for moderate to severe pain.   6.  Allopurinol 300 mg daily.   7.  Paroxetine 10 mg daily.   8.  Flomax 0.4 mg twice a day.   9.  Lopressor 25 mg daily.   10.  Aspirin 81 mg daily.[BR1.2]     Allergies:  No known drug allergies    Medications:    albuterol (2.5 MG/3ML) 0.083% neb solution  ipratropium - albuterol 0.5 mg/2.5 mg/3 mL (DUONEB) 0.5-2.5 (3) MG/3ML neb solution  predniSONE (DELTASONE) 10 MG tablet  order for DME  HYDROcodone-acetaminophen (NORCO) 5-325 MG per tablet  ALLOPURINOL PO  PARoxetine HCl (PAXIL PO)  tamsulosin (FLOMAX) 0.4 MG 24 hr capsule  metoprolol (LOPRESSOR) 25 MG tablet  BABY ASPIRIN 81 MG OR CHEW  Past Medical History:    Pneumonia  Vasovagal episodes  SIRS  Cataract  Cholelithiasis  COPD  Depression  Diabetes mellitus  hyperlipidemia  Hypertension  Obesity  Kidney stones  Spinal stenosis  stroke    Past Surgical History:    appendectomy  Cholecystectomy  Eye surgery  Joint replacement  Knee surgery  laminectomy lumbar one level  tonsillectomy    Family History:    History reviewed. No pertinent family history.     Social History:  Marital Status:   [2]  Smoking status: former  Alcohol use: no    Review of Systems   Constitutional: Negative for[BR1.1] chills[BR1.2] and[BR1.1] fever[BR1.2].   Respiratory: Negative for[BR1.1] cough[BR1.2] and[BR1.1] shortness of breath[BR1.2].    Cardiovascular: Positive for[BR1.1] leg swelling (around left knee)[BR1.2]. Negative for[BR1.1] chest pain[BR1.2] and[BR1.1] palpitations[BR1.2].   Gastrointestinal: Negative for[BR1.1] abdominal pain[BR1.2],[BR1.1] constipation[BR1.2],[BR1.1] diarrhea[BR1.2],[BR1.1] nausea[BR1.2] and[BR1.1] vomiting[BR1.2].   Genitourinary: Negative for[BR1.1] dysuria[BR1.2],[BR1.1] enuresis[BR1.2] and[BR1.1] hematuria[BR1.2].   Musculoskeletal: Positive  for[BR1.1] arthralgias (left knee, right foot.)[BR1.3]. Negative for[BR1.1] back pain[BR1.2] and[BR1.1] neck pain[BR1.2].   Neurological: Negative for[BR1.1] dizziness[BR1.2],[BR1.1] syncope[BR1.2],[BR1.1] light-headedness[BR1.2],[BR1.1] numbness[BR1.2] and[BR1.1] headaches[BR1.2].[BR1.1]   All other systems reviewed and are negative[BR1.2].  missed his chair, legs buckled out to the side, fell down. knee crepitus per ems. c/o severe left knee pain   Physical Exam[BR1.1]   Patient Vitals for the past 24 hrs:   BP Temp Temp src Pulse Resp SpO2 Height Weight   06/23/17 1541 100/64 99.2  F (37.3  C) Oral 84 20 90 % 1.829 m (6') 136.1 kg (300 lb)[BR1.3]     Physical Exam[BR1.1]  General: Well-nourished, high BMI  Eyes: PERRL, conjunctivae pink no scleral icterus or conjunctival injection  ENT:  Moist mucus membranes, posterior oropharynx clear without erythema or exudates  Respiratory:  Lungs clear to auscultation bilaterally, no crackles/rubs/wheezes.  Good air movement  CV: Normal rate and rhythm, no murmurs/rubs/gallops  GI:  Abdomen soft and non-distended.  Normoactive BS.  No tenderness, guarding or rebound  Skin: Warm, dry.  No rashes or petechiae  Musculoskeletal: Left leg with tenderness over tibial tubercle area and fibular head.  Abrasion in same area.  +soft tissue swelling. Unable to actively extend and pain with passive ROM.  Normal distal pulse and sensation, cap refill.  Right foot with bruising and swelling throughout.  Mild dorsal tenderness and tenderness over MTP joints.  Normal distal temp/cap refill/sensation.   Neuro: Alert and oriented to person/place/time  Psychiatric: Normal affect[AC1.2]      Emergency Department Course   Imaging:[BR1.1]  Radiographic findings were communicated with the patient who voiced understanding of the findings.  Knee XR, 3 views, left:[BR1.2]  Fracture of the proximal left tibia and fibula.[BR1.4]  As read by Radiology.[BR1.2]    Foot XR, G/E 3 views,  right:[BR1.5]  Diffuse osteopenia. Flexion at the IP joints of the  middle toe, very poorly visualized. Hallux valgus without significant  bunion formation. Degenerative changes at the first MTP joint. Osseous  density off the dorsal distal talus on the lateral view appears to  represent a mildly displaced fracture fragment, age indeterminate.  Correlate clinically.[BR1.6]  As read by Radiology.[BR1.5]    Procedures:[BR1.1]  PROCEDURE: Splint Placement.  Custom Ortho glass long leg splint was applied to the left extremity[BR1.7] and a short leg splint was applied to the right leg[BR1.8] and after placement I checked and adjusted the fit to ensure proper positioning.  The patient was more comfortable with the splint in place.  Sensation and circulation are intact after splint placement.[BR1.7]    Emergency Department Course:  Past medical records, nursing notes, and vitals reviewed.[BR1.1]  I[BR1.2] performed an exam of the patient and obtained history, as documented above.[BR1.1]  The patient was sent for a left knee xray while in the emergency department, findings above.[BR1.2]  Findings and plan explained to the Patient who consents to admission.    Discussed the patient with Dr. Hoover, who will admit the patient to a[BR1.7] OBS[BR1.9] bed for further monitoring, evaluation, and treatment.[BR1.7]     Impression & Plan    Medical Decision Making:[BR1.1]  Ron Gilmore is a 76 year old gentleman who had an accidental mechanical fall and sustained a tibia and fibula fracture on the left leg. It is non-operative per Dr. Verduzco who is in the ED to see the patient. Of note, the patient fell several weeks ago and has had persistent right foot pain, so a foot xray was ordered and shows a likely[BR1.9] a[AC1.2]vulsion fracture[BR1.9] of the talus[AC1.2]. Both of these fractures were splinted. He was having a difficult time with getting around at home regardless before this happened and at this point he is non-weight  bearing on the left side and he also has a fracture on the right[BR1.9] foot.[AC1.2] He is going to need to go to transitional care unit for further nursing care until he is able to ambulate and get around at home. I spoke with Dr. Hoover who graciously accepted to place him in the OBS unit for pain control further as well as placement. The patient was agreeable with this plan.[BR1.9]     Diagnosis:[BR1.1]    ICD-10-CM   1. Tibia/fibula fracture, left, closed, initial encounter S82.202A    S82.402A   2. Closed displaced avulsion fracture of right talus, initial encounter S92.151A   3. Fall, initial encounter W19.XXXA[BR1.7]     Disposition:[BR1.1]  Admitted to[BR1.7] OBS[BR1.9]    Jeannie Rivas  6/23/2017    EMERGENCY DEPARTMENT    I, Jeannie Rivas, am serving as a scribe at 4:16 PM on 6/23/2017 to document services personally performed by Nicole Ibrahim MD based on my observations and the provider's statements to me.[BR1.1]        Nicole Ibrahim MD  06/24/17 0100  [AC1.3]     Revision History        User Key Date/Time User Provider Type Action    > AC1.3 6/24/2017  1:00 AM Nicole Ibrahim MD Physician Sign     AC1.1 6/24/2017 12:59 AM Nicole Ibrahim MD Physician      AC1.2 6/24/2017 12:51 AM Nicole Ibrahim MD Physician Share     BR1.9 6/23/2017 11:43 PM Jeannie Rivas Share     [N/A] 6/23/2017  7:08 PM Jeannie Rivas Scribe Share     BR1.8 6/23/2017  6:03 PM Jeannie Rivas Scribe Share     BR1.7 6/23/2017  6:02 PM Jeannie Rivasibe Share     BR1.6 6/23/2017  5:46 PM Jeannie Rivase Share     [N/A] 6/23/2017  5:12 PM Jeannie Rivas Scribe Share     BR1.3 6/23/2017  5:11 PM Jeannie Rivas     BR1.5 6/23/2017  5:10 PM Jeannie Rivas      BR1.4 6/23/2017  5:02 PM Jeannie Rivas     BR1.2 6/23/2017  4:30 PM Jeannie Rivas     BR1.1 6/23/2017  4:19 PM Jeannie Rivas            ED Notes by Shireen Cabral at  6/23/2017  7:40 PM     Author:  Shireen Cabral Service:  (none) Author Type:  Emergency Room Technician    Filed:  6/23/2017  7:41 PM Date of Service:  6/23/2017  7:40 PM Creation Time:  6/23/2017  7:40 PM    Status:  Signed :  Shireen Cabral (Emergency Room Technician)         Took pt to the 5th floor to room 509 bed 1 via tech[LC1.1]     Revision History        User Key Date/Time User Provider Type Action    > LC1.1 6/23/2017  7:41 PM Shireen Cabral Emergency Room Technician Sign            ED Notes by Yashira Amaya RN at 6/23/2017  6:27 PM     Author:  Yashira Amaya RN Service:  (none) Author Type:  Registered Nurse    Filed:  6/23/2017  6:44 PM Date of Service:  6/23/2017  6:27 PM Creation Time:  6/23/2017  6:39 PM    Status:  Addendum :  Yashira Amaya RN (Registered Nurse)         [facility]  ED Nurse Handoff Report    ED Chief complaint: Knee Pain (missed his chair, legs buckled out to the side, fell down. knee crepitus per ems. c/o severe left knee pain ) and Fall      ED Diagnosis:   Final diagnoses:   Tibia/fibula fracture, left, closed, initial encounter   Closed displaced avulsion fracture of right talus, initial encounter   Fall, initial encounter       Code Status: see epic    Allergies: No Known Allergies    Activity level - Baseline/Home:  Stand with Assist  c walker or power wheelchair     Activity Level - Current:[CR1.1]   Total care[CR1.2].  NON WEIGHT BEARING on[CR1.1] bilateral LEs[CR1.2]. Pt has not gotten up in ED since fall     Needed?: No    Isolation: No  Infection: Not Applicable    Bariatric?: No    Vital Signs:   Vitals:    06/23/17 1541 06/23/17 1734 06/23/17 1737   BP: 100/64 92/63 97/58   Pulse: 84 82 78   Resp: 20 20 20   Temp: 99.2  F (37.3  C)     TempSrc: Oral     SpO2: 90% 96% 95%   Weight: 136.1 kg (300 lb)     Height: 1.829 m (6')         Cardiac Rhythm: ,        Pain  level: 0-10 Pain Scale: 0    Is this patient confused?: No    Patient Report: Initial Complaint: pt was attempting to sit down when his left knee buckled out and the pt fell down on top of leg. C/o left knee pain. Pt was recently d/c from [facility] with pneumonia. Uses 2L O2 at home, sating in the mid 90's. BP's running lower, wife states they typically lower in the 100's-110 systolically. Pt also reports falling last week and hurting his right foot.  Focused Assessment: left knee pain c some disformity noted, crepitus noted by EMS. Abrasion and bruising noted to left knee/shin.   Tests Performed: xrays  Abnormal Results:[CR1.1] Fracture of the proximal left tibia and fibula;[CR1.3] right foot xray: the dorsal distal talus on the lateral view appears to represent a mildly displaced fracture fragment[CR1.4]    Treatments provided:[CR1.3] splints placed[CR1.1] to bilateral LEs[CR1.4]    Family Comments: wife at bedside.     OBS brochure/video discussed/provided to patient: Yes    ED Medications: Medications - No data to display    Drips infusing?:  No      ED NURSE PHONE NUMBER: [phone][CR1.1]            Revision History        User Key Date/Time User Provider Type Action    > [N/A] 6/23/2017  6:44 PM Yashira Amaya RN Registered Nurse Addend     CR1.2 6/23/2017  6:42 PM Yashira Amaya RN Registered Nurse Addend     CR1.4 6/23/2017  6:41 PM Yashira Amaya RN Registered Nurse      CR1.3 6/23/2017  6:40 PM Yashira Amaya RN Registered Nurse Addend     CR1.1 6/23/2017  6:39 PM Yashira Amaya RN Registered Nurse Sign            ED Notes by Imelda Salmeron RN at 6/23/2017  4:40 PM     Author:  Imelda Salmeron, RN Service:  (none) Author Type:  Registered Nurse    Filed:  6/23/2017  4:40 PM Date of Service:  6/23/2017  4:40 PM Creation Time:  6/23/2017  4:40 PM    Status:  Signed :  Imelda Salmeron, RN (Registered Nurse)         Pt to x-ray via cart.[AH1.1]      Revision History        User Key Date/Time User Provider Type Action    > AH1.1 6/23/2017  4:40 PM Imelda Salmeron RN Registered Nurse Sign            ED Notes by Ok Sawant RN at 6/23/2017  3:38 PM     Author:  Ok Sawant RN Service:  (none) Author Type:  Registered Nurse    Filed:  6/23/2017  3:38 PM Date of Service:  6/23/2017  3:38 PM Creation Time:  6/23/2017  3:38 PM    Status:  Signed :  Ok Sawant RN (Registered Nurse)         Bed: ED22  Expected date:   Expected time:   Means of arrival:   Comments:  [facility] 76[facility] fall/knee pain     Revision History        User Key Date/Time User Provider Type Action    > RH1.1 6/23/2017  3:38 PM Ok Sawant, RN Registered Nurse Sign                  Procedure Notes     No notes of this type exist for this encounter.      Progress Notes - Therapies (Notes from 06/22/17 through 06/25/17)     No notes of this type exist for this encounter.

## 2017-06-23 NOTE — IP AVS SNAPSHOT
` [place] [address] [facility]: [phone]            Medication Administration Report for Ron Gilmore as of 06/25/17 1330   Legend:    Given Hold Not Given Due Canceled Entry Other Actions    Time Time (Time) Time  Time-Action       Inactive    Active    Linked        Medications 06/19/17 06/20/17 06/21/17 06/22/17 06/23/17 06/24/17 06/25/17    acetaminophen (TYLENOL) Suppository 650 mg  Dose: 650 mg Freq: EVERY 4 HOURS PRN Route: RE  PRN Reason: mild pain  Start: 06/23/17 1954   Admin Instructions: Alternate ibuprofen (if ordered) with acetaminophen.  Maximum acetaminophen dose from all sources = 75 mg/kg/day not to exceed 4 grams/day.               acetaminophen (TYLENOL) tablet 650 mg  Dose: 650 mg Freq: EVERY 4 HOURS PRN Route: PO  PRN Reason: mild pain  Start: 06/23/17 1954   Admin Instructions: Alternate ibuprofen (if ordered) with acetaminophen.  Maximum acetaminophen dose from all sources = 75 mg/kg/day not to exceed 4 grams/day.               albuterol neb solution 2.5 mg  Dose: 2.5 mg Freq: EVERY 2 HOURS PRN Route: NEBULIZATION  PRN Reasons: shortness of breath / dyspnea,other  PRN Comment: dyspnea  Start: 06/23/17 1954              aspirin EC EC tablet 81 mg  Dose: 81 mg Freq: DAILY Route: PO  Start: 06/24/17 0900   Admin Instructions: DO NOT CRUSH.          0916 (81 mg)-Given        0835 (81 mg)-Given           bisacodyl (DULCOLAX) Suppository 10 mg  Dose: 10 mg Freq: DAILY PRN Route: RE  PRN Reason: constipation  Start: 06/23/17 1954   Admin Instructions: Hold for loose stools.  This is the third step of a three step constipation treatment protocol.               ipratropium - albuterol 0.5 mg/2.5 mg/3 mL (DUONEB) neb solution 3 mL  Dose: 3 mL Freq: 4 TIMES DAILY Route: NEBULIZATION  Start: 06/23/17 2015        2018 (3 mL)-Given        0756 (3 mL)-Given       1105 (3 mL)-Given       1604 (3 mL)-Given       1950 (3 mL)-Given        (0749)-Not Given       1131 (3 mL)-Given        [ ] 1500       [ ] 1900           metoprolol (LOPRESSOR) half-tab 12.5 mg  Dose: 12.5 mg Freq: 2 TIMES DAILY Route: PO  Start: 06/23/17 2100   Admin Instructions: Hold for SBP <100 or HR <55         (2124)-Not Given [C]        0916 (12.5 mg)-Given       (2154)-Not Given        0835 (12.5 mg)-Given       [ ] 2100           naloxone (NARCAN) injection 0.1-0.4 mg  Dose: 0.1-0.4 mg Freq: EVERY 2 MIN PRN Route: IV  PRN Reason: opioid reversal  Start: 06/23/17 1954   Admin Instructions: For respiratory rate LESS than or EQUAL to 8.  Partial reversal dose:  0.1 mg titrated q 2 minutes for Analgesia Side Effects Monitoring Sedation Level of 3 (frequently drowsy, arousable, drifts to sleep during conversation).Full reversal dose:  0.4 mg bolus for Analgesia Side Effects Monitoring Sedation Level of 4 (somnolent, minimal or no response to stimulation).               neomycin-polymyxin-dexamethasone (MAXITROL) ophthalmic susp 1 drop  Dose: 1 drop Freq: 4 TIMES DAILY Route: RIGHT EYE  Start: 06/24/17 1800   End: 06/26/17 0859   Admin Instructions:  1 drop four times a day 6/24-6/25, 1 drop three times a day 6/26-6/28, 1 drop two times a day 6/29-6/30, 1 drop once daily on 7/1 and then stop          1938 (1 drop)-Given       2155 (1 drop)-Given        0838 (1 drop)-Given       1257 (1 drop)-Given       [ ] 1800       [ ] 2200           ondansetron (ZOFRAN-ODT) ODT tab 4 mg  Dose: 4 mg Freq: EVERY 6 HOURS PRN Route: PO  PRN Reason: nausea  Start: 06/23/17 1954   Admin Instructions: This is Step 1 of nausea and vomiting management.  If nausea not resolved in 15 minutes, go to Step 2 prochlorperazine (COMPAZINE). Do not push through foil backing. Peel back foil and gently remove. Place on tongue immediately. Administration with liquid unnecessary              Or  ondansetron (ZOFRAN) injection 4 mg  Dose: 4 mg Freq: EVERY 6 HOURS PRN Route: IV  PRN Reasons: nausea,vomiting  Start: 06/23/17 1954   Admin Instructions: This is Step  1 of nausea and vomiting management.  If nausea not resolved in 15 minutes, go to Step 2 prochlorperazine (COMPAZINE).  Irritant.               oxyCODONE (ROXICODONE) IR tablet 5-10 mg  Dose: 5-10 mg Freq: EVERY 3 HOURS PRN Route: PO  PRN Reason: moderate to severe pain  Start: 06/23/17 1954   Admin Instructions: If age greater than 65 use lower dose for first time use.               PARoxetine (PAXIL) tablet 10 mg  Dose: 10 mg Freq: DAILY Route: PO  Start: 06/24/17 0900         0916 (10 mg)-Given        0835 (10 mg)-Given           polyethylene glycol (MIRALAX/GLYCOLAX) Packet 17 g  Dose: 17 g Freq: DAILY PRN Route: PO  PRN Reason: constipation  Start: 06/23/17 1954   Admin Instructions: Give in 8oz of  water, juice, or soda. Hold for loose stools.  This is the second step of a three step constipation treatment protocol.  1 Packet = 17 grams. Mixed prescribed dose in 8 ounces of water. Follow with 8 oz. of water.               prochlorperazine (COMPAZINE) injection 5 mg  Dose: 5 mg Freq: EVERY 6 HOURS PRN Route: IV  PRN Reasons: nausea,vomiting  Start: 06/23/17 1954   Admin Instructions: This is Step 2 of nausea and vomiting management.   If nausea not resolved in 15 minutes, give metoclopramide (REGLAN) if ordered (step 3 of nausea and vomiting management)              Or  prochlorperazine (COMPAZINE) tablet 5 mg  Dose: 5 mg Freq: EVERY 6 HOURS PRN Route: PO  PRN Reason: vomiting  Start: 06/23/17 1954   Admin Instructions: This is Step 2 of nausea and vomiting management.   If nausea not resolved in 15 minutes, give metoclopramide (REGLAN) if ordered (step 3 of nausea and vomiting management)              Or  prochlorperazine (COMPAZINE) Suppository 12.5 mg  Dose: 12.5 mg Freq: EVERY 12 HOURS PRN Route: RE  PRN Reasons: nausea,vomiting  Start: 06/23/17 1954   Admin Instructions: This is Step 2 of nausea and vomiting management.   If nausea not resolved in 15 minutes, give metoclopramide (REGLAN) if ordered (step 3  of nausea and vomiting management)               senna-docusate (SENOKOT-S;PERICOLACE) 8.6-50 MG per tablet 1-2 tablet  Dose: 1-2 tablet Freq: 2 TIMES DAILY PRN Route: PO  PRN Comment: constipation   Start: 06/23/17 1954   Admin Instructions: If no bowel movement in 24 hours, increase to 2 tablets PO BID.  Hold for loose stools.   This is the first step of a three step constipation treatment protocol.               tamsulosin (FLOMAX) capsule 0.4 mg  Dose: 0.4 mg Freq: 2 TIMES DAILY Route: PO  Start: 06/23/17 2100   Admin Instructions: Administer 30 minutes after the same meal each day.  Capsules should be swallowed whole; do not crush chew or open.         2121 (0.4 mg)-Given        0916 (0.4 mg)-Given       2155 (0.4 mg)-Given        0835 (0.4 mg)-Given       [ ] 2100          Future Medications  Medications 06/19/17 06/20/17 06/21/17 06/22/17 06/23/17 06/24/17 06/25/17       neomycin-polymyxin-dexamethasone (MAXITROL) ophthalmic susp 1 drop  Dose: 1 drop Freq: 3 TIMES DAILY Route: RIGHT EYE  Start: 06/26/17 0900   End: 06/29/17 0859   Admin Instructions:  1 drop four times a day 6/24-6/25, 1 drop three times a day 6/26-6/28, 1 drop two times a day 6/29-6/30, 1 drop once daily on 7/1 and then stop               neomycin-polymyxin-dexamethasone (MAXITROL) ophthalmic susp 1 drop  Dose: 1 drop Freq: 2 TIMES DAILY Route: RIGHT EYE  Start: 06/29/17 0900   End: 07/01/17 0859   Admin Instructions:  1 drop four times a day 6/24-6/25, 1 drop three times a day 6/26-6/28, 1 drop two times a day 6/29-6/30, 1 drop once daily on 7/1 and then stop              Completed Medications  Medications 06/19/17 06/20/17 06/21/17 06/22/17 06/23/17 06/24/17 06/25/17         Dose: 1,000 mL Freq: ONCE Route: IV  Last Dose: 1,000 mL (06/24/17 1036)  Start: 06/24/17 0930   End: 06/24/17 1436         1036 (1,000 mL)-New Bag              Dose: 1 drop Freq: ONCE Route: RIGHT EYE  Start: 07/01/17 0900   End: 06/24/17 1938   Admin Instructions:  1  drop four times a day 6/24-6/25, 1 drop three times a day 6/26-6/28, 1 drop two times a day 6/29-6/30, 1 drop once daily on 7/1 and then stop          1938 (1 drop)-Given              Dose: 20 mg Freq: DAILY Route: PO  Start: 06/24/17 0900   End: 06/25/17 0835         0916 (20 mg)-Given        0835 (20 mg)-Given          Discontinued Medications  Medications 06/19/17 06/20/17 06/21/17 06/22/17 06/23/17 06/24/17 06/25/17         Dose: 2 tablet Freq: EVERY 4 HOURS PRN Route: PO  PRN Reason: moderate to severe pain  Start: 06/23/17 1954   End: 06/24/17 0743   Admin Instructions: Maximum acetaminophen dose from all sources= 75 mg/kg/day not to exceed 4 grams          0743-Med Discontinued

## 2017-06-23 NOTE — PHARMACY-ADMISSION MEDICATION HISTORY
Admission medication history interview status for the 6/23/2017  admission is complete. See EPIC admission navigator for prior to admission medications     Medication history source reliability:Good    Actions taken by pharmacist (provider contacted, etc):Called Tammie for info on antibiotics (oral and ocular)   Additional medication history information not noted on PTA med list : Completed Levaquin on 6/21/17  Medication reconciliation/reorder completed by provider prior to medication history? No  Time spent in this activity: 20 minutes    Prior to Admission medications    Medication Sig Last Dose Taking? Auth Provider   sulfamethoxazole-trimethoprim (BACTRIM DS/SEPTRA DS) 800-160 MG per tablet Take 1 tablet by mouth 2 times daily For 10 days 6/23/2017 at am Yes Unknown, Entered By History   neomycin-polymyxin-dexamethasone (MAXITROL) 3.5-08434-4.1 SUSP ophthalmic susp Place 1 drop into the right eye See Admin Instructions Started 6/22/2017. 1 drop four times a day for 4 days, 1 drop three times a day for 3 days, 1 drop two times a day for 2 days, 1 drop once daily for 1 day and then stop 6/23/2017 at Unknown time Yes Unknown, Entered By History   albuterol (2.5 MG/3ML) 0.083% neb solution Take 1 vial (2.5 mg) by nebulization every 2 hours as needed for shortness of breath / dyspnea or other (dyspnea) prn Yes Wali Ibrahim MD   ipratropium - albuterol 0.5 mg/2.5 mg/3 mL (DUONEB) 0.5-2.5 (3) MG/3ML neb solution Take 1 vial (3 mLs) by nebulization 4 times daily 6/23/2017 at x1 Yes Wali Ibrahim MD   predniSONE (DELTASONE) 10 MG tablet 4 tabs daily for 3 days, then 3 tabs daily for 3 days, then 2 tabs daily for 3 days, then 1 tab daily for 3 days, then stop 6/23/2017 at am Yes Wali Ibrahim MD   HYDROcodone-acetaminophen (NORCO) 5-325 MG per tablet Take 2 tablets by mouth every 4 hours as needed for moderate to severe pain prn Yes Reported, Patient   ALLOPURINOL PO Take 300 mg by mouth daily 6/23/2017 at am Yes  Unknown, Entered By History   PARoxetine HCl (PAXIL PO) Take 10 mg by mouth daily 6/23/2017 at am Yes Unknown, Entered By History   tamsulosin (FLOMAX) 0.4 MG 24 hr capsule Take 0.4 mg by mouth 2 times daily 6/23/2017 at am Yes Unknown, Entered By History   metoprolol (LOPRESSOR) 25 MG tablet Take 12.5 mg by mouth 2 times daily  6/23/2017 at am Yes    BABY ASPIRIN 81 MG OR CHEW 1 TABLET DAILY 6/23/2017 at am Yes Reported, Patient

## 2017-06-23 NOTE — IP AVS SNAPSHOT
[place] [address] [facility]: [phone]                                              INTERAGENCY TRANSFER FORM - LAB / IMAGING / EKG / EMG RESULTS   2017                    Hospital Admission Date: 2017  SHERI THORPE   : 1940  Sex: Male        Attending Provider: Cristi Hoover MD     Allergies:  No Known Allergies    Infection:  None   Service:  HOSPITALIST    Ht:  1.829 m (6')   Wt:  136.1 kg (300 lb)   Admission Wt:  136.1 kg (300 lb)    BMI:  40.69 kg/m 2   BSA:  2.63 m 2            Patient PCP Information     Provider PCP Type    Augustin Thomas MD General         Lab Results - 3 Days      Glucose by meter [[id number]]  Resulted: 17 020, Result status: Final result    Ordering provider: Cristi Hoover MD  17 Resulting lab: POINT OF CARE TEST, GLUCOSE    Specimen Information    Type Source Collected On     17 013          Components       Value Reference Range Flag Lab   Glucose 95 70 - 99 mg/dL  170            Basic metabolic panel [[id number]] (Abnormal)  Resulted: 17, Result status: Final result    Ordering provider: Cristi Hoover MD  17 Resulting lab: [facility]    Specimen Information    Type Source Collected On   Blood  17 2100          Components       Value Reference Range Flag Lab   Sodium 135 133 - 144 mmol/L  FrStHsLb   Potassium 4.4 3.4 - 5.3 mmol/L  FrStHsLb   Chloride 100 94 - 109 mmol/L  FrStHsLb   Carbon Dioxide 31 20 - 32 mmol/L  FrStHsLb   Anion Gap 4 3 - 14 mmol/L  FrStHsLb   Glucose 152 70 - 99 mg/dL H FrStHsLb   Urea Nitrogen 24 7 - 30 mg/dL  FrStHsLb   Creatinine 0.92 0.66 - 1.25 mg/dL  FrStHsLb   GFR Estimate 81 >60 mL/min/1.7m2  FrStHsLb   Comment:  Non  GFR Calc   GFR Estimate If Black -- >60 mL/min/1.7m2  FrStHsLb   Result:         >90   GFR Calc     Calcium 8.4 8.5 - 10.1 mg/dL L FrStHsLb   Result:              UA with Microscopic  [[id number]] (Abnormal)  Resulted: 06/23/17 2127, Result status: Final result    Ordering provider: Cristi Hoover MD  06/23/17 1954 Resulting lab: [facility]    Specimen Information    Type Source Collected On   Urine Urine clean catch 06/23/17 2115          Components       Value Reference Range Flag Lab   Color Urine Yellow   FrStHsLb   Appearance Urine Clear   FrStHsLb   Glucose Urine Negative NEG mg/dL  FrStHsLb   Bilirubin Urine Negative NEG  FrStHsLb   Ketones Urine Negative NEG mg/dL  FrStHsLb   Specific [place] Urine 1.010 1.003 - 1.035  FrStHsLb   Blood Urine Moderate NEG A FrStHsLb   pH Urine 6.5 5.0 - 7.0 pH  FrStHsLb   Protein Albumin Urine Negative NEG mg/dL  FrStHsLb   Urobilinogen mg/dL Normal 0.0 - 2.0 mg/dL  FrStHsLb   Nitrite Urine Negative NEG  FrStHsLb   Leukocyte Esterase Urine Negative NEG  FrStHsLb   Source Clean catch urine   FrStHsLb   WBC Urine 2 0 - 2 /HPF  FrStHsLb   RBC Urine 92 0 - 2 /HPF H FrStHsLb   Squamous Epithelial /HPF Urine <1 0 - 1 /HPF  FrStHsLb   Mucous Urine Present NEG /LPF A FrStHsLb            Hemoglobin [[id number]]  Resulted: 06/23/17 2116, Result status: Final result    Ordering provider: Cristi Hoover MD  06/23/17 1954 Resulting lab: [facility]    Specimen Information    Type Source Collected On   Blood  06/23/17 2100          Components       Value Reference Range Flag Lab   Hemoglobin 14.6 13.3 - 17.7 g/dL  FrStHsLb            Glucose by meter [[id number]] (Abnormal)  Resulted: 06/23/17 2016, Result status: Final result    Ordering provider: Cristi Hoover MD  06/23/17 2009 Resulting lab: POINT OF CARE TEST, GLUCOSE    Specimen Information    Type Source Collected On     06/23/17 2009          Components       Value Reference Range Flag Lab   Glucose 121 70 - 99 mg/dL H 170            Testing Performed By     Lab - Abbreviation Name Director Address Valid Date Range    14 - FrStHsLb [facility] Unknown [address] [place] [address] 05/08/15 1057 - Present    170 - Unknown POINT OF CARE TEST, GLUCOSE Unknown Unknown 10/31/11 1114 - Present            Unresulted Labs     None         Imaging Results - 3 Days      Foot  XR, G/E 3 views, right [[id number]]  Resulted: 06/23/17 1734, Result status: Final result    Ordering provider: Nicole Ibrahim MD  06/23/17 1708 Resulted by: Krystal Rivera MD    Performed: 06/23/17 1718 - 06/23/17 1729 Resulting lab: RADIOLOGY RESULTS    Narrative:       XR FOOT RT G/E 3 VW  6/23/2017 5:29 PM    HISTORY:  pain right foot    COMPARISON:  None.      Impression:       IMPRESSION:  Diffuse osteopenia. Flexion at the IP joints of the  middle toe, very poorly visualized. Hallux valgus without significant  bunion formation. Degenerative changes at the first MTP joint. Osseous  density off the dorsal distal talus on the lateral view appears to  represent a mildly displaced fracture fragment, age indeterminate.  Correlate clinically.    KRYSTAL RIVERA MD      XR Knee Right 1/2 Views [[id number]]  Resulted: 06/23/17 1706, Result status: Final result    Ordering provider: Nicole Ibrahim MD  06/23/17 1622 Resulted by: Ruddy Lennon MD    Performed: 06/23/17 1637 - 06/23/17 1656 Resulting lab: RADIOLOGY RESULTS    Narrative:       KNEE RIGHT ONE TO TWO VIEWS    6/23/2017 4:56 PM     HISTORY: Fall, pain    COMPARISON: None.    FINDINGS: Mildly displaced transverse fracture of the proximal left  tibial shaft and proximal fibular shaft at the same level.  Hypertrophic changes with medial compartmental narrowing of the left  knee. Arterial calcification posterior to the knee. Fluid in the  suprapatellar bursa.      Impression:       IMPRESSION: Fracture of the proximal left tibia and fibula.    RUDDY LENNON MD      Testing Performed By     Lab - Abbreviation Name Director Address Valid Date Range    104 - Rad Rslts RADIOLOGY RESULTS Unknown Unknown 02/16/05 1553 - Present             Encounter-Level Documents:     There are no encounter-level documents.      Order-Level Documents:     There are no order-level documents.

## 2017-06-23 NOTE — IP AVS SNAPSHOT
` [place] [address] [facility]: [phone]                                              INTERAGENCY TRANSFER FORM - NURSING   2017                    Hospital Admission Date: 2017  SHERI THORPE   : 1940  Sex: Male        Attending Provider: Cristi Hoover MD     Allergies:  No Known Allergies    Infection:  None   Service:  HOSPITALIST    Ht:  1.829 m (6')   Wt:  136.1 kg (300 lb)   Admission Wt:  136.1 kg (300 lb)    BMI:  40.69 kg/m 2   BSA:  2.63 m 2            Patient PCP Information     Provider PCP Type    Augustin Thomas MD General      Current Code Status     Date Active Code Status Order ID Comments User Context       Prior      Code Status History     Date Active Date Inactive Code Status Order ID Comments User Context    2017  1:08 PM  Full Code [id number]  Ediwn Rodríguez MD Outpatient    2017  2:34 PM 2017  1:08 PM Full Code [id number]  Shan Kilpatrick III, MD Outpatient    2017  7:54 PM 2017  2:34 PM Full Code [id number]  Cristi Hoover MD Inpatient    2017 10:38 AM 2017  7:54 PM Full Code [id number]  Wali Ibrahim MD Outpatient    10/25/2015  9:03 AM 2017 10:38 AM Full Code [id number]  Thais Wilson MD Outpatient    10/25/2015  8:52 AM 10/25/2015  9:03 AM Full Code [id number]  Thais Wilson MD Outpatient    10/24/2015  2:41 PM 10/25/2015  8:52 AM Full Code [id number]  Kaerl Raman MD Inpatient    10/15/2013  3:59 AM 10/20/2013  6:27 PM Full Code [id number]  Pamela Lake, SOFIA Inpatient      Advance Directives        Does patient have a scanned Advance Directive/ACP document in EPIC?           Yes        Hospital Problems as of 2017              Priority Class Noted POA    Tibia/fibula fracture Medium  2017 Yes    Closed tibia fracture Medium  2017 Yes      Non-Hospital Problems as of 2017              Priority Class Noted    SIRS (systemic inflammatory response syndrome) (H) Medium   10/24/2015    Vasovagal episode Medium  10/25/2015    Pneumonia Medium  6/13/2017      Immunizations     None         END      ASSESSMENT     Discharge Profile Flowsheet     EXPECTED DISCHARGE     FINAL RESOURCES      Expected Discharge Date  06/24/17 (to 6/25 TCU) 06/24/17 0823   Resources List  Home Care;DME 06/20/17 1456    DISCHARGE NEEDS ASSESSMENT     PAS Number  [id number] 06/25/17 1115    Concerns To Be Addressed  adjustment to diagnosis/illness concerns;home safety concerns 06/20/17 1456   Senior Linkage Line Referral Placed  06/25/17 06/25/17 1115    Concerns Comments  Safety Concerns. 06/20/17 1456   F/U Appointment Brochure Provided  -- (N/A) 06/20/17 1456    Equipment Currently Used at Home  power chair;bath bench;grab bar;raised toilet;walker, rolling;oxygen 06/20/17 1456   Referrals Placed  Emergent Admission to SNF/TCU 06/25/17 1115    Transportation Available  van, wheelchair accessible 06/15/17 1137   SKIN      # of Referrals Placed by CTS  Post Acute Facilities 06/24/17 1028   Inspection  Full 06/25/17 0948    Equipment Used at Home  grab bar;hospital bed;lift device;power chair;walker, rolling;bath bench 10/26/15 1342   Skin WDL  ex 06/25/17 1238    GASTROINTESTINAL (ADULT,PEDIATRIC,OB)     Skin Temperature  warm 06/25/17 1238    GI WDL  WDL 06/25/17 0948   Skin Moisture  dry 06/25/17 1238    Last Bowel Movement  06/24/17 06/25/17 0131   Skin Integrity  bruise(s);scab(s) 06/25/17 1238    Passing flatus  yes 06/25/17 0131   Skin areas NOT inspected  Buttock, right;Buttock, left;Hip, right;Hip, left;Sacrum;Coccyx;Spine 06/24/17 2125    COMMUNICATION ASSESSMENT     SAFETY      Patient's communication style  spoken language (English or Bilingual) 06/23/17 1539   Safety WDL  WDL 06/25/17 1238                 Assessment WDL (Within Defined Limits) Definitions           Safety WDL     Effective: 09/28/15    Row Information: <b>WDL Definition:</b> Bed in low position, wheels locked; call light in reach;  "upper side rails up x 2; ID band on<br> <font color=\"gray\"><i>Item=AS safety wdl>>List=AS safety wdl>>Version=F14</i></font>      Skin WDL     Effective: 09/28/15    Row Information: <b>WDL Definition:</b> Warm; dry; intact; elastic; without discoloration; pressure points without redness<br> <font color=\"gray\"><i>Item=AS skin wdl>>List=AS skin wdl>>Version=F14</i></font>      Vitals     Vital Signs Flowsheet     VITAL SIGNS     Height  1.829 m (6') 06/23/17 1541    Temp  98.3  F (36.8  C) 06/25/17 0755   Height Method  Stated 06/23/17 1541    Temp src  Oral 06/25/17 0755   Weight  136.1 kg (300 lb) 06/23/17 1541    Resp  18 06/25/17 0755   BSA (Calculated - sq m)  2.63 06/23/17 1541    Pulse  99 06/25/17 0755   BMI (Calculated)  40.77 06/23/17 1541    Heart Rate  97 06/25/17 0755   CHEN COMA SCALE      Pulse/Heart Rate Source  Monitor 06/25/17 0755   Best Eye Response  4-->(E4) spontaneous 06/23/17 1545    BP  114/56 06/25/17 0755   Best Motor Response  6-->(M6) obeys commands 06/23/17 1545    BP Location  Right arm 06/25/17 0755   Best Verbal Response  5-->(V5) oriented 06/23/17 1545    OXYGEN THERAPY     [place] Coma Scale Score  15 06/23/17 1545    SpO2  95 % 06/25/17 1133   POSITIONING      O2 Device  Nasal cannula 06/25/17 1133   Body Position  supine, head elevated 06/25/17 0948    Oxygen Delivery  2 LPM 06/25/17 1133   Head of Bed (HOB)  HOB at 20-30 degrees 06/25/17 0948    PAIN/COMFORT     Positioning/Transfer Devices  mechanical lift utilized 06/25/17 1238    Patient Currently in Pain  denies 06/25/17 0944   DAILY CARE      0-10 Pain Scale  0 06/25/17 0944   Activity Type  up in chair 06/25/17 1238    Pain Management Interventions  medication offered but refused;no interventions per patient request 06/25/17 0948   Activity Level of Assistance  assistance, 2 people 06/25/17 1238    HEIGHT AND WEIGHT     Additional Documentation  -- 06/25/17 1238            Patient Lines/Drains/Airways Status    Active " LINES/DRAINS/AIRWAYS     Name: Placement date: Placement time: Site: Days: Last dressing change:    Peripheral IV 06/23/17 Left Hand 06/23/17   1858   Hand   1     Wound 10/24/15 Coccyx reddened coccyx, blanchable, skin intact 10/24/15   1617   Coccyx   609     Wound 06/13/17 Right Toe (Comment  which one) Other (comment) 06/13/17   0404   Toe (Comment  which one)   12             Patient Lines/Drains/Airways Status    Active PICC/CVC     None            Intake/Output Detail Report     Date Intake   Output Net    Shift P.O. IV Piggyback Total Urine Total       Noc 06/23/17 2300 - 06/24/17 0659 -- -- -- 100 100 -100    Day 06/24/17 0700 - 06/24/17 1459 540 -- 540 400 400 140    Jazmin 06/24/17 1500 - 06/24/17 2259 -- -- -- 100 100 -100    Noc 06/24/17 2300 - 06/25/17 0659 240 -- 240 500 500 -260    Day 06/25/17 0700 - 06/25/17 1459 360 -- 360 550 550 -190      Last Void/BM       Most Recent Value    Urine Occurrence 1 at 06/25/2017 1219    Stool Occurrence 1 at 06/25/2017 1219      Case Management/Discharge Planning     Case Management/Discharge Planning Flowsheet     REFERRAL INFORMATION     Patient Personal Strengths  able to adapt 06/15/17 1137    Did the Initial Social Work Assessment result in a Social Work Case?  Yes 06/24/17 1028   EXPECTED DISCHARGE      Admission Type  observation 06/24/17 1028   Expected Discharge Date  06/24/17 (to 6/25 TCU) 06/24/17 0823    Arrived From  home or self-care 06/24/17 1028   ASSESSMENT/CONCERNS TO BE ADDRESSED      Referral Source  physician 06/24/17 1028   Concerns To Be Addressed  adjustment to diagnosis/illness concerns;home safety concerns 06/20/17 1456    # of Referrals Placed by CTS  Post Acute Facilities 06/24/17 1028   Concerns Comments  Safety Concerns. 06/20/17 1456    Post Acute Facilities  TCU 06/25/17 1115   DISCHARGE PLANNING      Reason For Consult  discharge planning 06/25/17 1115   Transportation Available  van, wheelchair accessible 06/15/17 1137    Record  Reviewed  medical record 06/25/17 1115   Outpatient/Agency/Support Group Needs  homecare agency (specify level of care) 10/20/13 1444     Assigned to Case  Mateo Tabares 06/25/17 1115   Community Agency Name(S)  [facility] 10/20/13 1444    LIVING ENVIRONMENT     Equipment Used at Home  grab bar;hospital bed;lift device;power chair;walker, rolling;bath bench 10/26/15 1342    Lives With  spouse 06/24/17 1028   FINAL RESOURCES      Living Arrangements  condominium 06/24/17 1028   Equipment Currently Used at Home  power chair;bath bench;grab bar;raised toilet;walker, rolling;oxygen 06/20/17 1456    Provides Primary Care For  no one 06/24/17 1028   Resources List  Home Care;DME 06/20/17 1456    Quality Of Family Relationships  supportive;involved 06/24/17 1028   PAS Number  [id number] 06/25/17 1115    ASSESSMENT OF FAMILY/SOCIAL SUPPORT     Senior Linkage Line Referral Placed  06/25/17 06/25/17 1115    Marital Status   06/24/17 1028   F/U Appointment Brochure Provided  -- (N/A) 06/20/17 1456    Who is your support system?  Wife 06/24/17 1028   Referrals Placed  Emergent Admission to SNF/TCU 06/25/17 1115    Spouse's Name  Yuli 06/24/17 1028   ABUSE RISK SCREEN      Description of Support System  Supportive;Involved 06/24/17 1028   QUESTION TO PATIENT:  Has a member of your family or a partner(now or in the past) intimidated, hurt, manipulated, or controlled you in any way?  no 06/23/17 1542    Support Assessment  Adequate family and caregiver support 06/24/17 1028   QUESTION TO PATIENT: Do you feel safe going back to the place where you are living?  yes 06/23/17 1542    Quality of Family Relationships  supportive;involved 06/24/17 1028   OBSERVATION: Is there reason to believe there has been maltreatment of a vulnerable adult (ie. Physical/Sexual/Emotional abuse, self neglect, lack of adequate food, shelter, medical care, or financial exploitation)?  no 06/23/17 1542    EMPLOYMENT     (R)  MENTAL HEALTH SUICIDE RISK      Do you work full or part-time?  no 06/24/17 1028   Are you depressed or being treated for depression?  No 06/24/17 2131    COPING/STRESS     HOMICIDE RISK      Major Change/Loss/Stressor  none 06/24/17 2130   Homicidal Ideation  no 06/23/17 1549

## 2017-06-23 NOTE — ED NOTES
Bed: ED22  Expected date:   Expected time:   Means of arrival:   Comments:  [facility] 76m fall/knee pain

## 2017-06-23 NOTE — IP AVS SNAPSHOT
[place] [address] [facility]: [phone]                                              INTERAGENCY TRANSFER FORM - PHYSICIAN ORDERS   2017                    Hospital Admission Date: 2017  SHERI THORPE   : 1940  Sex: Male        Attending Provider: Cristi Hoover MD     Allergies:  No Known Allergies    Infection:  None   Service:  HOSPITALIST    Ht:  1.829 m (6')   Wt:  136.1 kg (300 lb)   Admission Wt:  136.1 kg (300 lb)    BMI:  40.69 kg/m 2   BSA:  2.63 m 2            Patient PCP Information     Provider PCP Type    Augustin Thomas MD General      ED Clinical Impression     Diagnosis Description Comment Added By Time Added    Tibia/fibula fracture, left, closed, initial encounter [S82.202A, S82.402A] Tibia/fibula fracture, left, closed, initial encounter [S82.202A, S82.402A]  Nicole Ibrahim MD 2017  5:44 PM    Closed displaced avulsion fracture of right talus, initial encounter [S92.151A] Closed displaced avulsion fracture of right talus, initial encounter [S92.151A]  Nicole Ibrahim MD 2017  5:44 PM    Fall, initial encounter [W19.XXXA] Fall, initial encounter [W19.XXXA]  Nicole Ibrahim MD 2017  5:45 PM      Hospital Problems as of 2017              Priority Class Noted POA    Tibia/fibula fracture Medium  2017 Yes    Closed tibia fracture Medium  2017 Yes      Non-Hospital Problems as of 2017              Priority Class Noted    SIRS (systemic inflammatory response syndrome) (H) Medium  10/24/2015    Vasovagal episode Medium  10/25/2015    Pneumonia Medium  2017      Code Status History     Date Active Date Inactive Code Status Order ID Comments User Context    2017  1:08 PM  Full Code [id number]  Edwin Rodríguez MD Outpatient    2017  2:34 PM 2017  1:08 PM Full Code [id number]  Shan Kilpatrick III, MD Outpatient    2017  7:54 PM 2017  2:34 PM Full Code [id number]  Cristi Hoover MD Inpatient     6/17/2017 10:38 AM 6/23/2017  7:54 PM Full Code [id number]  Wali Ibrahim MD Outpatient    10/25/2015  9:03 AM 6/17/2017 10:38 AM Full Code [id number]  Thais Wilson MD Outpatient    10/25/2015  8:52 AM 10/25/2015  9:03 AM Full Code [id number]  Thais Wilson MD Outpatient    10/24/2015  2:41 PM 10/25/2015  8:52 AM Full Code [id number]  Karel Raman MD Inpatient    10/15/2013  3:59 AM 10/20/2013  6:27 PM Full Code [id number]  Pamela Lake RN Inpatient         Medication Review      START taking        Dose / Directions Comments    Acidophilus Lactobacillus Caps   Used for:  Diarrhea, unspecified type        Dose:  1 packet   Take 1 packet by mouth 2 times daily   Quantity:  14 capsule   Refills:  1        oxyCODONE 5 MG IR tablet   Commonly known as:  ROXICODONE   Used for:  Tibia/fibula fracture, left, closed, initial encounter        Every  4  To  6  Hrs  5  -  10  mg   Quantity:  80 tablet   Refills:  0          CONTINUE these medications which have NOT CHANGED        Dose / Directions Comments    albuterol (2.5 MG/3ML) 0.083% neb solution   Used for:  COPD exacerbation (H)        Dose:  2.5 mg   Take 1 vial (2.5 mg) by nebulization every 2 hours as needed for shortness of breath / dyspnea or other (dyspnea)   Quantity:  360 mL   Refills:  0        ALLOPURINOL PO        Dose:  300 mg   Take 300 mg by mouth daily   Refills:  0        BABY ASPIRIN 81 MG chewable tablet   Generic drug:  aspirin        1 TABLET DAILY   Refills:  0        FLOMAX 0.4 MG capsule   Generic drug:  tamsulosin        Dose:  0.4 mg   Take 0.4 mg by mouth 2 times daily   Refills:  0        ipratropium - albuterol 0.5 mg/2.5 mg/3 mL 0.5-2.5 (3) MG/3ML neb solution   Commonly known as:  DUONEB   Used for:  COPD exacerbation (H)        Dose:  3 mL   Take 1 vial (3 mLs) by nebulization 4 times daily   Quantity:  120 mL   Refills:  0        metoprolol 25 MG tablet   Commonly known as:  LOPRESSOR        Dose:  12.5 mg   Take 12.5 mg by mouth 2  times daily 1/2 25mg tablet= 12.5mg   Quantity:  180 tablet   Refills:  3        neomycin-polymyxin-dexamethasone [id number] Susp ophthalmic susp   Commonly known as:  MAXITROL        Dose:  1 drop   Place 1 drop into the right eye See Admin Instructions Started 6/22/2017. 1 drop four times a day for 4 days, 1 drop three times a day for 3 days, 1 drop two times a day for 2 days, 1 drop once daily for 1 day and then stop   Refills:  0        order for DME   Used for:  COPD exacerbation (H)        Equipment being ordered: Other: Home nebulizer Treatment Diagnosis: COPD/Pneumonia   Quantity:  1 Device   Refills:  0        PAXIL PO        Dose:  10 mg   Take 10 mg by mouth daily   Refills:  0        predniSONE 10 MG tablet   Commonly known as:  DELTASONE   Used for:  COPD exacerbation (H)        4 tabs daily for 3 days, then 3 tabs daily for 3 days, then 2 tabs daily for 3 days, then 1 tab daily for 3 days, then stop   Quantity:  30 tablet   Refills:  0        sulfamethoxazole-trimethoprim 800-160 MG per tablet   Commonly known as:  BACTRIM DS/SEPTRA DS        Dose:  1 tablet   Take 1 tablet by mouth 2 times daily For 10 days   Refills:  0          STOP taking     HYDROcodone-acetaminophen 5-325 MG per tablet   Commonly known as:  NORCO                     Further instructions from your care team       Pt to d/c to [facility] (P: [phone]; F: [phone]) at 1530 via HE w/c.     Summary of Visit     Reason for your hospital stay       Mr. Gilmore came to us after a fall (See H+P). He suffered a mildly displaced proximal left tibial and fibular fracture, which orthopedics has recommended managing conservatively with a knee immobilizer. He was also found to have a mildly displaced fracture fragment of the dorsal distal talus from a prior fall, and this was casted in the emergency department. Given his bilateral leg immobilization at present, he will require skilled care and ongoing PT/OT at the TCU.              After Care     Activity - Ambulate in hallway       Every shift       Activity - Up ad osmany           Activity - Up with assistive device           Advance Diet as Tolerated       Follow this diet upon discharge: Orders Placed This Encounter      Regular Diet Adult --> Should be ADA/CHO-appropriate diet if pt. Complies...       Fall precautions           General info for SNF       Length of Stay Estimate: Short Term Care: Estimated # of Days <30  Condition at Discharge: Stable  Level of care:skilled   Rehabilitation Potential: Excellent  Admission H&P remains valid and up-to-date: Yes  Recent Chemotherapy: N/A  Use Nursing Home Standing Orders: Yes       General info for SNF       Length of Stay Estimate: Short Term Care: Estimated # of Days <30  Condition at Discharge: Improving  Level of care:skilled   Rehabilitation Potential: Good  Admission H&P remains valid and up-to-date: Yes  Recent Chemotherapy: N/A  Use Nursing Home Standing Orders: Yes       General info for SNF       Length of Stay Estimate: Short Term Care: Estimated # of Days <30  Condition at Discharge: Improving  Level of care:skilled   Rehabilitation Potential: Good  Admission H&P remains valid and up-to-date: Yes  Recent Chemotherapy: N/A  Use Nursing Home Standing Orders: Yes       Glucose monitor nursing POCT       Before meals and at bedtime; review glucose numbers w/ Rounding Provider/PCP in next several days; no specific treatment or Sliding scale at time of discharge.       Mantoux instructions       Give two-step Mantoux (PPD) Per Facility Policy Yes       Mantoux instructions       Give two-step Mantoux (PPD) Per Facility Policy Yes       Mantoux instructions       Give two-step Mantoux (PPD) Per Facility Policy Yes       Weight bearing status       wbat    No  rom  Of  Knee       Weight bearing status       Non weight-bearing for 3 days, afterwards can progressively increase weight bearing as tolerated       Weight bearing status       No  Weight-bearing on bilateral legs for several days; see Ortho. Surgery notes (from Dr. Verduzco) re. Transitioning to weight-bearing.             Procedures     Oxygen - Nasal cannula       1-2 Lpm by nasal cannula to keep O2 sats 90% or greater.             Referrals     Occupational Therapy Adult Consult       Evaluate and treat as clinically indicated.    Reason:  History of stroke, already working with OT at home       Occupational Therapy Adult Consult       Evaluate and treat as clinically indicated.    Reason:  ? Eval. For poss. Mild cognitive impairment.       Physical Therapy Adult Consult       Evaluate and treat as clinically indicated.    Reason:  wbat    No rom  Of the   Knee  On  fx  side       Physical Therapy Adult Consult       Evaluate and treat as clinically indicated.    Reason:  Fall, history of stroke       Physical Therapy Adult Consult       Evaluate and treat as clinically indicated.    Reason:  Recent R. Foot and L. Tib/fib. fractures             Other Orders     POST-OP FOLLOW-UP VISIT       Re ck in  3  Weeks in  [place]  Office  Or  [facility]  office             Follow-Up Appointment Instructions     Future Labs/Procedures    Follow Up and recommended labs and tests     Comments:    1. Follow up with Nursing home physician.  In next week: Check HgbA1C --> review w/ [facility] provider and/or pt's. PCP Dr. Thomas  2. Re. BRAD --> Needs overnight sleep study scheduled  3. Follow up with Dr. Verduzco/Ortho. Surg in next 2-3 weeks  4. Re-check UA in next 5-7 days re. Recent hematuria; to be reviewed w/ Dr. Thomas  5. Follow up with Dr. Thmoas re. Recent change in bowel movements/diarrhea w/u underway      Follow-Up Appointment Instructions     Follow Up and recommended labs and tests       1. Follow up with Nursing home physician.  In next week: Check HgbA1C --> review w/ [facility] provider and/or pt's. PCP Dr. Thomas  2. Re. BRAD --> Needs overnight sleep study scheduled  3. Follow up with Dr. Kumaror/Ortho. Surg  in next 2-3 weeks  4. Re-check UA in next 5-7 days re. Recent hematuria; to be reviewed w/ Dr. Thomas  5. Follow up with Dr. hTomas re. Recent change in bowel movements/diarrhea w/u underway             Statement of Approval     Ordered          06/25/17 1327  I have reviewed and agree with all the recommendations and orders detailed in this document.  EFFECTIVE NOW     Approved and electronically signed by:  Edwin Rodríguez MD           06/25/17 1255  I have reviewed and agree with all the recommendations and orders detailed in this document.  EFFECTIVE NOW     Approved and electronically signed by:  Edwin Rodríguez MD           06/25/17 1132  I have reviewed and agree with all the recommendations and orders detailed in this document.  EFFECTIVE NOW     Approved and electronically signed by:  Edwin Rodríguez MD           06/24/17 1436  I have reviewed and agree with all the recommendations and orders detailed in this document.  EFFECTIVE NOW     Approved and electronically signed by:  Shan Kilpatrick III, MD

## 2017-06-23 NOTE — IP AVS SNAPSHOT
MRN:[id number]                      After Visit Summary   6/23/2017    Ron Gilmore    MRN: [id number]           Thank you!     Thank you for choosing [facility] for your care. Our goal is always to provide you with excellent care. Hearing back from our patients is one way we can continue to improve our services. Please take a few minutes to complete the written survey that you may receive in the mail after you visit with us. Thank you!        Patient Information     Date Of Birth          1940        About your hospital stay     You were admitted on:  June 23, 2017 You last received care in the:  [place] [address] [facility]    You were discharged on:  June 25, 2017        Reason for your hospital stay       Mr. Gilmore came to us after a fall (See H+P). He suffered a mildly displaced proximal left tibial and fibular fracture, which orthopedics has recommended managing conservatively with a knee immobilizer. He was also found to have a mildly displaced fracture fragment of the dorsal distal talus from a prior fall, and this was casted in the emergency department. Given his bilateral leg immobilization at present, he will require skilled care and ongoing PT/OT at the TCU.                  Who to Call     For medical emergencies, please call 911.  For non-urgent questions about your medical care, please call your primary care provider or clinic, [phone]          Attending Provider     Provider Specialty    Nicole Ibrahim MD Emergency Medicine    Charan Verduzco MD Surgery    Cristi Hoover MD Internal Medicine       Primary Care Provider Office Phone # Fax #    Augustin Thomas [phone]      After Care Instructions     Activity - Ambulate in hallway       Every shift            Activity - Up ad osmany           Activity - Up with assistive device           Advance Diet as Tolerated       Follow this diet upon discharge: Orders Placed This Encounter       Regular Diet Adult --> Should be ADA/CHO-appropriate diet if pt. Complies...            Fall precautions           General info for SNF       Length of Stay Estimate: Short Term Care: Estimated # of Days <30  Condition at Discharge: Stable  Level of care:skilled   Rehabilitation Potential: Excellent  Admission H&P remains valid and up-to-date: Yes  Recent Chemotherapy: N/A  Use Nursing Home Standing Orders: Yes            General info for SNF       Length of Stay Estimate: Short Term Care: Estimated # of Days <30  Condition at Discharge: Improving  Level of care:skilled   Rehabilitation Potential: Good  Admission H&P remains valid and up-to-date: Yes  Recent Chemotherapy: N/A  Use Nursing Home Standing Orders: Yes            General info for SNF       Length of Stay Estimate: Short Term Care: Estimated # of Days <30  Condition at Discharge: Improving  Level of care:skilled   Rehabilitation Potential: Good  Admission H&P remains valid and up-to-date: Yes  Recent Chemotherapy: N/A  Use Nursing Home Standing Orders: Yes            Glucose monitor nursing POCT       Before meals and at bedtime; review glucose numbers w/ Rounding Provider/PCP in next several days; no specific treatment or Sliding scale at time of discharge.            Mantoux instructions       Give two-step Mantoux (PPD) Per Facility Policy Yes            Mantoux instructions       Give two-step Mantoux (PPD) Per Facility Policy Yes            Mantoux instructions       Give two-step Mantoux (PPD) Per Facility Policy Yes            Oxygen - Nasal cannula       1-2 Lpm by nasal cannula to keep O2 sats 90% or greater.            Weight bearing status       wbat    No  rom  Of  Knee            Weight bearing status       Non weight-bearing for 3 days, afterwards can progressively increase weight bearing as tolerated            Weight bearing status       No Weight-bearing on bilateral legs for several days; see Ortho. Surgery notes (from Dr. Verduzco) re.  Transitioning to weight-bearing.                  Follow-up Appointments     Follow Up and recommended labs and tests       1. Follow up with Nursing home physician.  In next week: Check HgbA1C --> review w/ [facility] provider and/or pt's. PCP Dr. Thomas  2. Re. BRAD --> Needs overnight sleep study scheduled  3. Follow up with Dr. Verduzco/Ortho. Surg in next 2-3 weeks  4. Re-check UA in next 5-7 days re. Recent hematuria; to be reviewed w/ Dr. Thomas  5. Follow up with Dr. Thomas re. Recent change in bowel movements/diarrhea w/u underway                  Additional Services     Occupational Therapy Adult Consult       Evaluate and treat as clinically indicated.    Reason:  History of stroke, already working with OT at home            Occupational Therapy Adult Consult       Evaluate and treat as clinically indicated.    Reason:  ? Eval. For poss. Mild cognitive impairment.            Physical Therapy Adult Consult       Evaluate and treat as clinically indicated.    Reason:  wbat    No rom  Of the   Knee  On  fx  side            Physical Therapy Adult Consult       Evaluate and treat as clinically indicated.    Reason:  Fall, history of stroke            Physical Therapy Adult Consult       Evaluate and treat as clinically indicated.    Reason:  Recent R. Foot and L. Tib/fib. fractures                  Future tests that were ordered for you     POST-OP FOLLOW-UP VISIT       Re ck in  3  Weeks in  phyllis  Office  Or  [facility]  office                  Further instructions from your care team       Pt to d/c to [facility] (P: [phone]; F: [phone]) at 1530 via HE w/c.     Pending Results     No orders found from 6/21/2017 to 6/24/2017.            Statement of Approval     Ordered          06/25/17 6668  I have reviewed and agree with all the recommendations and orders detailed in this document.  EFFECTIVE NOW     Approved and electronically signed by:  Edwin Rodríguez MD           06/25/17 7025  I have reviewed and  "agree with all the recommendations and orders detailed in this document.  EFFECTIVE NOW     Approved and electronically signed by:  Edwin Rodríguez MD           17 1132  I have reviewed and agree with all the recommendations and orders detailed in this document.  EFFECTIVE NOW     Approved and electronically signed by:  Edwin Rodríguez MD           17 1436  I have reviewed and agree with all the recommendations and orders detailed in this document.  EFFECTIVE NOW     Approved and electronically signed by:  Shan Kilpatrick III, MD             Admission Information     Date & Time Provider Department Dept. Phone    2017 Cristi Hoover MD [place] [address] [facility] [phone]      Your Vitals Were     Blood Pressure Pulse Temperature Respirations Height Weight    114/56 (BP Location: Right arm) 99 98.3  F (36.8  C) (Oral) 18 1.829 m (6') 136.1 kg (300 lb)    Pulse Oximetry BMI (Body Mass Index)                95% 40.69 kg/m2          MyChart Information     [employer] lets you send messages to your doctor, view your test results, renew your prescriptions, schedule appointments and more. To sign up, go to [email or web address] . Click on \"Log in\" on the left side of the screen, which will take you to the Welcome page. Then click on \"Sign up Now\" on the right side of the page.     You will be asked to enter the access code listed below, as well as some personal information. Please follow the directions to create your username and password.     Your access code is: [email or web address]  Expires: 9/15/2017 10:52 AM     Your access code will  in 90 days. If you need help or a new code, please call your [place] clinic or [phone].        Care EveryWhere ID     This is your Care EveryWhere ID. This could be used by other organizations to access your [place] medical records  [id number]        Equal Access to Services     MICHELLE MEIER AH: cory Ayala " brandinanamaria, alan herbertamber meyer, ryan hernandesaan ah. So [facility] [phone].    ATENCIÓN: Si raissa schneider, tiene a dover disposición servicios gratuitos de asistencia lingüística. Jonathon al [phone].    We comply with applicable federal civil rights laws and [place] laws. We do not discriminate on the basis of race, color, national origin, age, disability sex, sexual orientation or gender identity.               Review of your medicines      START taking        Dose / Directions    Acidophilus Lactobacillus Caps   Used for:  Diarrhea, unspecified type        Dose:  1 packet   Take 1 packet by mouth 2 times daily   Quantity:  14 capsule   Refills:  1       oxyCODONE 5 MG IR tablet   Commonly known as:  ROXICODONE   Used for:  Tibia/fibula fracture, left, closed, initial encounter        Every  4  To  6  Hrs  5  -  10  mg   Quantity:  80 tablet   Refills:  0         CONTINUE these medicines which have NOT CHANGED        Dose / Directions    albuterol (2.5 MG/3ML) 0.083% neb solution   Used for:  COPD exacerbation (H)        Dose:  2.5 mg   Take 1 vial (2.5 mg) by nebulization every 2 hours as needed for shortness of breath / dyspnea or other (dyspnea)   Quantity:  360 mL   Refills:  0       ALLOPURINOL PO        Dose:  300 mg   Take 300 mg by mouth daily   Refills:  0       BABY ASPIRIN 81 MG chewable tablet   Generic drug:  aspirin        1 TABLET DAILY   Refills:  0       FLOMAX 0.4 MG capsule   Generic drug:  tamsulosin        Dose:  0.4 mg   Take 0.4 mg by mouth 2 times daily   Refills:  0       ipratropium - albuterol 0.5 mg/2.5 mg/3 mL 0.5-2.5 (3) MG/3ML neb solution   Commonly known as:  DUONEB   Used for:  COPD exacerbation (H)        Dose:  3 mL   Take 1 vial (3 mLs) by nebulization 4 times daily   Quantity:  120 mL   Refills:  0       metoprolol 25 MG tablet   Commonly known as:  LOPRESSOR        Dose:  12.5 mg   Take 12.5 mg by mouth 2 times daily 1/2 25mg tablet= 12.5mg    Quantity:  180 tablet   Refills:  3       neomycin-polymyxin-dexamethasone 3.5-15736-8.1 Susp ophthalmic susp   Commonly known as:  MAXITROL        Dose:  1 drop   Place 1 drop into the right eye See Admin Instructions Started 6/22/2017. 1 drop four times a day for 4 days, 1 drop three times a day for 3 days, 1 drop two times a day for 2 days, 1 drop once daily for 1 day and then stop   Refills:  0       order for DME   Used for:  COPD exacerbation (H)        Equipment being ordered: Other: Home nebulizer Treatment Diagnosis: COPD/Pneumonia   Quantity:  1 Device   Refills:  0       PAXIL PO        Dose:  10 mg   Take 10 mg by mouth daily   Refills:  0       predniSONE 10 MG tablet   Commonly known as:  DELTASONE   Used for:  COPD exacerbation (H)        4 tabs daily for 3 days, then 3 tabs daily for 3 days, then 2 tabs daily for 3 days, then 1 tab daily for 3 days, then stop   Quantity:  30 tablet   Refills:  0       sulfamethoxazole-trimethoprim 800-160 MG per tablet   Commonly known as:  BACTRIM DS/SEPTRA DS        Dose:  1 tablet   Take 1 tablet by mouth 2 times daily For 10 days   Refills:  0         STOP taking     HYDROcodone-acetaminophen 5-325 MG per tablet   Commonly known as:  NORCO                Where to get your medicines      Some of these will need a paper prescription and others can be bought over the counter. Ask your nurse if you have questions.     Bring a paper prescription for each of these medications     oxyCODONE 5 MG IR tablet       You don't need a prescription for these medications     Acidophilus Lactobacillus Caps                Protect others around you: Learn how to safely use, store and throw away your medicines at www.disposemymeds.org.             Medication List: This is a list of all your medications and when to take them. Check marks below indicate your daily home schedule. Keep this list as a reference.      Medications           Morning Afternoon Evening Bedtime As Needed     Acidophilus Lactobacillus Caps   Take 1 packet by mouth 2 times daily                                albuterol (2.5 MG/3ML) 0.083% neb solution   Take 1 vial (2.5 mg) by nebulization every 2 hours as needed for shortness of breath / dyspnea or other (dyspnea)   Next Dose Due:  Continue on discharge                                 ALLOPURINOL PO   Take 300 mg by mouth daily   Next Dose Due:  Continue on discharge                                 BABY ASPIRIN 81 MG chewable tablet   1 TABLET DAILY   Generic drug:  aspirin   Next Dose Due:  Continue on discharge                                 FLOMAX 0.4 MG capsule   Take 0.4 mg by mouth 2 times daily   Last time this was given:  0.4 mg on 6/25/2017  8:35 AM   Generic drug:  tamsulosin   Next Dose Due:  6/25 6/26 6/25               ipratropium - albuterol 0.5 mg/2.5 mg/3 mL 0.5-2.5 (3) MG/3ML neb solution   Commonly known as:  DUONEB   Take 1 vial (3 mLs) by nebulization 4 times daily   Last time this was given:  3 mLs on 6/25/2017 11:31 AM                                metoprolol 25 MG tablet   Commonly known as:  LOPRESSOR   Take 12.5 mg by mouth 2 times daily 1/2 25mg tablet= 12.5mg   Last time this was given:  12.5 mg on 6/25/2017  8:35 AM   Next Dose Due:  6/25 6/26 6/25               neomycin-polymyxin-dexamethasone 3.5-64798-1.1 Susp ophthalmic susp   Commonly known as:  MAXITROL   Place 1 drop into the right eye See Admin Instructions Started 6/22/2017. 1 drop four times a day for 4 days, 1 drop three times a day for 3 days, 1 drop two times a day for 2 days, 1 drop once daily for 1 day and then stop   Last time this was given:  1 drop on 6/25/2017 12:57 PM                                order for DME   Equipment being ordered: Other: Home nebulizer Treatment Diagnosis: COPD/Pneumonia                                oxyCODONE 5 MG IR tablet   Commonly known as:  ROXICODONE   Every  4  To  6  Hrs  5  -  10  mg   Next Dose  Due:  Continue on discharge                                 PAXIL PO   Take 10 mg by mouth daily   Last time this was given:  10 mg on 6/25/2017  8:35 AM   Next Dose Due:  6/26                                   predniSONE 10 MG tablet   Commonly known as:  DELTASONE   4 tabs daily for 3 days, then 3 tabs daily for 3 days, then 2 tabs daily for 3 days, then 1 tab daily for 3 days, then stop   Last time this was given:  20 mg on 6/25/2017  8:35 AM                                sulfamethoxazole-trimethoprim 800-160 MG per tablet   Commonly known as:  BACTRIM DS/SEPTRA DS   Take 1 tablet by mouth 2 times daily For 10 days   Next Dose Due:  Continue on discharge

## 2017-06-23 NOTE — ED PROVIDER NOTES
History   Chief Complaint:  Knee Pain    HPI   Ron Gilmore is a 76 year old male with a history of stroke, hypertension, hyperlipidemia and diabetes mellitus who presents via EMS with lateral left knee pain. EMS reports knee crepitus. The patient reports he was using his walker and fell trying to get to his power chair, fell with left leg under him and fell on his buttocks, around 1430 and his occupational therapist was there for the first time. He reports his leg went out on him and he denies hitting his head. He reports some numbness around the left knee. While in the ED the patient reports he is having some right foot pain to. He denies nausea, vomiting, diarrhea, dysuria, hematuria, syncope or chest pain. He denies neck pain or shortness of breath. He denies tingling or numbness below the left knee or in the right leg. He declines an offer of pain medication in the ED. Of note, the patient reports his tetanus is up to date.     6/13/2017  DISCHARGE DIAGNOSES:   1.  Chronic obstructive pulmonary disease exacerbation.   2.  Community-acquired pneumonia.   3.  Benign prostatic hypertrophy.   4.  Type 2 diabetes.   5.  Morbid obesity.   6.  Acute hypoxic and hypercapnic respiratory failure due to COPD and community-acquired pneumonia.   7.  Suspect sleep apnea.   8.  Recent right eye surgery.       DISCHARGE MEDICATIONS:   1.  Albuterol 2.5 mg every 2 hours as needed for shortness of breath or dyspnea.   2.  DuoNeb 3 mL 4 times a day.   3.  Prednisone taper 40 mg for 3 days, then 30 mg for 3 days, then 20 mg for 3 days, then 10 mg for 3 days, then stop.   4.  Levaquin 750 mg daily for 5 days.   5.  Norco 2 tabs every 4 hours as needed for moderate to severe pain.   6.  Allopurinol 300 mg daily.   7.  Paroxetine 10 mg daily.   8.  Flomax 0.4 mg twice a day.   9.  Lopressor 25 mg daily.   10.  Aspirin 81 mg daily.     Allergies:  No known drug allergies    Medications:    albuterol (2.5 MG/3ML) 0.083% neb  solution  ipratropium - albuterol 0.5 mg/2.5 mg/3 mL (DUONEB) 0.5-2.5 (3) MG/3ML neb solution  predniSONE (DELTASONE) 10 MG tablet  order for DME  HYDROcodone-acetaminophen (NORCO) 5-325 MG per tablet  ALLOPURINOL PO  PARoxetine HCl (PAXIL PO)  tamsulosin (FLOMAX) 0.4 MG 24 hr capsule  metoprolol (LOPRESSOR) 25 MG tablet  BABY ASPIRIN 81 MG OR CHEW  Past Medical History:    Pneumonia  Vasovagal episodes  SIRS  Cataract  Cholelithiasis  COPD  Depression  Diabetes mellitus  hyperlipidemia  Hypertension  Obesity  Kidney stones  Spinal stenosis  stroke    Past Surgical History:    appendectomy  Cholecystectomy  Eye surgery  Joint replacement  Knee surgery  laminectomy lumbar one level  tonsillectomy    Family History:    History reviewed. No pertinent family history.     Social History:  Marital Status:   [2]  Smoking status: former  Alcohol use: no    Review of Systems   Constitutional: Negative for chills and fever.   Respiratory: Negative for cough and shortness of breath.    Cardiovascular: Positive for leg swelling (around left knee). Negative for chest pain and palpitations.   Gastrointestinal: Negative for abdominal pain, constipation, diarrhea, nausea and vomiting.   Genitourinary: Negative for dysuria, enuresis and hematuria.   Musculoskeletal: Positive for arthralgias (left knee, right foot.). Negative for back pain and neck pain.   Neurological: Negative for dizziness, syncope, light-headedness, numbness and headaches.   All other systems reviewed and are negative.  missed his chair, legs buckled out to the side, fell down. knee crepitus per ems. c/o severe left knee pain   Physical Exam   Patient Vitals for the past 24 hrs:   BP Temp Temp src Pulse Resp SpO2 Height Weight   06/23/17 1541 100/64 99.2  F (37.3  C) Oral 84 20 90 % 1.829 m (6') 136.1 kg (300 lb)     Physical Exam  General: Well-nourished, high BMI  Eyes: PERRL, conjunctivae pink no scleral icterus or conjunctival injection  ENT:  Moist  mucus membranes, posterior oropharynx clear without erythema or exudates  Respiratory:  Lungs clear to auscultation bilaterally, no crackles/rubs/wheezes.  Good air movement  CV: Normal rate and rhythm, no murmurs/rubs/gallops  GI:  Abdomen soft and non-distended.  Normoactive BS.  No tenderness, guarding or rebound  Skin: Warm, dry.  No rashes or petechiae  Musculoskeletal: Left leg with tenderness over tibial tubercle area and fibular head.  Abrasion in same area.  +soft tissue swelling. Unable to actively extend and pain with passive ROM.  Normal distal pulse and sensation, cap refill.  Right foot with bruising and swelling throughout.  Mild dorsal tenderness and tenderness over MTP joints.  Normal distal temp/cap refill/sensation.   Neuro: Alert and oriented to person/place/time  Psychiatric: Normal affect      Emergency Department Course   Imaging:  Radiographic findings were communicated with the patient who voiced understanding of the findings.  Knee XR, 3 views, left:  Fracture of the proximal left tibia and fibula.  As read by Radiology.    Foot XR, G/E 3 views, right:  Diffuse osteopenia. Flexion at the IP joints of the  middle toe, very poorly visualized. Hallux valgus without significant  bunion formation. Degenerative changes at the first MTP joint. Osseous  density off the dorsal distal talus on the lateral view appears to  represent a mildly displaced fracture fragment, age indeterminate.  Correlate clinically.  As read by Radiology.    Procedures:  PROCEDURE: Splint Placement.  Custom Ortho glass long leg splint was applied to the left extremity and a short leg splint was applied to the right leg and after placement I checked and adjusted the fit to ensure proper positioning.  The patient was more comfortable with the splint in place.  Sensation and circulation are intact after splint placement.    Emergency Department Course:  Past medical records, nursing notes, and vitals reviewed.  I performed an  exam of the patient and obtained history, as documented above.  The patient was sent for a left knee xray while in the emergency department, findings above.  Findings and plan explained to the Patient who consents to admission.    Discussed the patient with Dr. Hoover, who will admit the patient to a OBS bed for further monitoring, evaluation, and treatment.     Impression & Plan    Medical Decision Making:  Ron Gilmore is a 76 year old gentleman who had an accidental mechanical fall and sustained a tibia and fibula fracture on the left leg. It is non-operative per Dr. Verduzco who is in the ED to see the patient. Of note, the patient fell several weeks ago and has had persistent right foot pain, so a foot xray was ordered and shows a likely avulsion fracture of the talus. Both of these fractures were splinted. He was having a difficult time with getting around at home regardless before this happened and at this point he is non-weight bearing on the left side and he also has a fracture on the right foot. He is going to need to go to transitional care unit for further nursing care until he is able to ambulate and get around at home. I spoke with Dr. Hoover who graciously accepted to place him in the OBS unit for pain control further as well as placement. The patient was agreeable with this plan.     Diagnosis:    ICD-10-CM   1. Tibia/fibula fracture, left, closed, initial encounter S82.202A    S82.402A   2. Closed displaced avulsion fracture of right talus, initial encounter S92.151A   3. Fall, initial encounter W19.XXXA     Disposition:  Admitted to OBS    Jeannie Rivas  6/23/2017    EMERGENCY DEPARTMENT    Jeannie LAUREN, am serving as a scribe at 4:16 PM on 6/23/2017 to document services personally performed by Nicole Ibrahim MD based on my observations and the provider's statements to me.        Nicole Ibrahim MD  06/24/17 0100

## 2017-06-23 NOTE — CONSULTS
REQUESTING PHYSICIAN:  Nicole Ibrahim MD      HISTORY OF PRESENT ILLNESS:  Ron Gilmore had a mechanical fall by history.  Sustained an injury to his left lower extremity.  He asked me to look at his right foot.  He was in last week after a fall.  He was admitted for pneumonia.  His foot has been hurting since the fall.  Denies frequent falls.  He is extremely inactive and disheveled.  His personal history is positive for stroke, hyperlipidemia, diabetes, hypertension.  Today with acute left knee pain and crepitus.  He was utilizing his walker, fell trying to get to his power chair, fell with left leg under him onto his butt.  Occurred about 2:30 in the afternoon.  His OT person was there at that time.      In the Emergency Department, he is having some right foot pain as well.  He related that to last week.  Reviewed the old records, being treated for pneumonia.  He says he is on oxygen from that and he does not require oxygen normally he states.      At this point in time, reviewed the 06/13 discharge medication list, etc.      PHYSICAL EXAMINATION:  A 2-3 cm longitudinal abrasion anterior aspect of the left knee area.  Quads, patellar tendon are intact.  He is tender at the proximal tibia area.  Peroneal nerve, deep and superficial, is intact, as are edges of tibial nerves.      Opposite knee for comparison is negative.  Left knee itself shows no bruising at this point.  Hip moves well without referred pain.  Left ankle nontender.  Dorsiflexion, plantarflexion are intact.  Does not have pain with passive flexion or extension of the toes.  Right foot ecchymotic, plantarflexed foot.  Pedal edema moderate.  Very tender MTP joints, 2, 3, 4.  There is no malrotation or severe angulation of the toes.  Everyone of the distal first 4 toes are very ecchymotic.  Plantar-wise, there is some as well.  Lisfranc joints are nontender.  Ankle joint nontender.      IMAGING:  X-rays are pending in the foot.  His tibia shows metaphyseal  fracture, proximal third/middle third junction and fibula, as well other intact cortices.  No severe angulation, no displacement.      ASSESSMENT AND PLAN:  This patient is very disheveled appearing.  I suspect he is low demand, low activity.  I would recommend an x-ray of that right foot.  If there are some fractures there, Cam boot would be fine.  In regards to left leg, well-padded immobilizer.  He will be kept nonweightbearing.  If it gets to be unreliable or displaces it, then it may become a surgical problem, but right now, I think it is best treated nonsurgically.      My own gut feeling, I would bring him in the hospital, obs care, get him into a nursing facility, especially if there is an injury documented on the right foot.      The patient agrees with this approach.  I will be happy to see him in the morning if he is still in the hospital.         LAURA ZAPATA MD             D: 2017 17:24   T: 2017 17:54   MT: TS      Name:     SHERI THORPE   MRN:      [id number]        Account:       [id number]   :      1940           Consult Date:  2017      Document: [id number]       cc: Nicole Thomas MD

## 2017-06-24 PROCEDURE — 25000132 ZZH RX MED GY IP 250 OP 250 PS 637: Performed by: INTERNAL MEDICINE

## 2017-06-24 PROCEDURE — 94640 AIRWAY INHALATION TREATMENT: CPT | Mod: 76

## 2017-06-24 PROCEDURE — 25000125 ZZHC RX 250: Performed by: INTERNAL MEDICINE

## 2017-06-24 PROCEDURE — L1832 KO ADJ JNT POS R SUP PRE CST: HCPCS

## 2017-06-24 PROCEDURE — 94640 AIRWAY INHALATION TREATMENT: CPT

## 2017-06-24 PROCEDURE — 25000128 H RX IP 250 OP 636: Performed by: INTERNAL MEDICINE

## 2017-06-24 PROCEDURE — 99207 ZZC CDG-CODE CATEGORY CHANGED: CPT | Performed by: INTERNAL MEDICINE

## 2017-06-24 PROCEDURE — 99225 ZZC SUBSEQUENT OBSERVATION CARE,LEVEL II: CPT | Performed by: INTERNAL MEDICINE

## 2017-06-24 PROCEDURE — G0378 HOSPITAL OBSERVATION PER HR: HCPCS

## 2017-06-24 PROCEDURE — 40000275 ZZH STATISTIC RCP TIME EA 10 MIN

## 2017-06-24 RX ORDER — NEOMYCIN SULFATE, POLYMYXIN B SULFATE AND DEXAMETHASONE 3.5; 10000; 1 MG/ML; [USP'U]/ML; MG/ML
1 SUSPENSION/ DROPS OPHTHALMIC 3 TIMES DAILY
Status: DISCONTINUED | OUTPATIENT
Start: 2017-06-26 | End: 2017-06-25 | Stop reason: HOSPADM

## 2017-06-24 RX ORDER — NEOMYCIN SULFATE, POLYMYXIN B SULFATE AND DEXAMETHASONE 3.5; 10000; 1 MG/ML; [USP'U]/ML; MG/ML
1 SUSPENSION/ DROPS OPHTHALMIC 4 TIMES DAILY
Status: DISCONTINUED | OUTPATIENT
Start: 2017-06-24 | End: 2017-06-25 | Stop reason: HOSPADM

## 2017-06-24 RX ORDER — NEOMYCIN SULFATE, POLYMYXIN B SULFATE AND DEXAMETHASONE 3.5; 10000; 1 MG/ML; [USP'U]/ML; MG/ML
1 SUSPENSION/ DROPS OPHTHALMIC ONCE
Status: COMPLETED | OUTPATIENT
Start: 2017-07-01 | End: 2017-06-24

## 2017-06-24 RX ORDER — OXYCODONE HYDROCHLORIDE 5 MG/1
TABLET ORAL
Qty: 80 TABLET | Refills: 0 | Status: SHIPPED | OUTPATIENT
Start: 2017-06-24 | End: 2017-06-26

## 2017-06-24 RX ORDER — NEOMYCIN SULFATE, POLYMYXIN B SULFATE AND DEXAMETHASONE 3.5; 10000; 1 MG/ML; [USP'U]/ML; MG/ML
1 SUSPENSION/ DROPS OPHTHALMIC 2 TIMES DAILY
Status: DISCONTINUED | OUTPATIENT
Start: 2017-06-29 | End: 2017-06-25 | Stop reason: HOSPADM

## 2017-06-24 RX ADMIN — ASPIRIN 81 MG: 81 TABLET, COATED ORAL at 09:16

## 2017-06-24 RX ADMIN — PREDNISONE 20 MG: 20 TABLET ORAL at 09:16

## 2017-06-24 RX ADMIN — NEOMYCIN SULFATE, POLYMYXIN B SULFATE AND DEXAMETHASONE 1 DROP: 3.5; 10000; 1 SUSPENSION/ DROPS OPHTHALMIC at 19:38

## 2017-06-24 RX ADMIN — IPRATROPIUM BROMIDE AND ALBUTEROL SULFATE 3 ML: .5; 3 SOLUTION RESPIRATORY (INHALATION) at 07:56

## 2017-06-24 RX ADMIN — IPRATROPIUM BROMIDE AND ALBUTEROL SULFATE 3 ML: .5; 3 SOLUTION RESPIRATORY (INHALATION) at 16:04

## 2017-06-24 RX ADMIN — SODIUM CHLORIDE 1000 ML: 9 INJECTION, SOLUTION INTRAVENOUS at 10:36

## 2017-06-24 RX ADMIN — TAMSULOSIN HYDROCHLORIDE 0.4 MG: 0.4 CAPSULE ORAL at 09:16

## 2017-06-24 RX ADMIN — TAMSULOSIN HYDROCHLORIDE 0.4 MG: 0.4 CAPSULE ORAL at 21:55

## 2017-06-24 RX ADMIN — METOPROLOL TARTRATE 12.5 MG: 25 TABLET, FILM COATED ORAL at 09:16

## 2017-06-24 RX ADMIN — PAROXETINE HYDROCHLORIDE 10 MG: 10 TABLET, FILM COATED ORAL at 09:16

## 2017-06-24 RX ADMIN — IPRATROPIUM BROMIDE AND ALBUTEROL SULFATE 3 ML: .5; 3 SOLUTION RESPIRATORY (INHALATION) at 11:05

## 2017-06-24 RX ADMIN — NEOMYCIN SULFATE, POLYMYXIN B SULFATE AND DEXAMETHASONE 1 DROP: 3.5; 10000; 1 SUSPENSION/ DROPS OPHTHALMIC at 21:55

## 2017-06-24 RX ADMIN — IPRATROPIUM BROMIDE AND ALBUTEROL SULFATE 3 ML: .5; 3 SOLUTION RESPIRATORY (INHALATION) at 19:50

## 2017-06-24 NOTE — PLAN OF CARE
Problem: Goal Outcome Summary  Goal: Goal Outcome Summary  Outcome: No Change  Patient A&Ox4. VSS except Soft BP, on 2LPM Oxygen baseline. LS diminished . On scheduled Neb Rx. Denies pain @ rest . Alfredo LE splinted. & NWB LLE.dry skin. Iv saline locked. -ve for U/A. Void on urinal. BG checked per pt request. .On regular diet. No surgery per Ortho. Plan Pt consult & d/c to TCU once placement found. .

## 2017-06-24 NOTE — PLAN OF CARE
"Problem: Goal Outcome Summary  Goal: Goal Outcome Summary  PT: Orders received, chart reviewed, eval attempted. Pt present with RN, NWB L LE and orthotics consult placed for hinged L LE brace, per Ortho, \"Hold  Off wt  Bearing  For  Few  Days  Then  wbat\".  No orders regarding R LE though per ortho imaging negative for fractures at this time, RN unsure of status at this time as well. B LEs splinted. Per chart, pt medically stable for discharge to TCU at this time and pt agreeable to go. Pt reports at baseline ambulates 5-10' at a time with FWW, otherwise uses wheel chair, has difficulty with L LE (emily with mobility) and L UE with grasp 2/2 previous CVA. Pt also reports chronic back pain limiting mobility at baseline. Was receiving home therapies prior to current admission. Per discussion with pt, pt declining full assessment of sliding board transfer (to maintain NWB B LEs until clarified), declines up to recliner with use of lift, requesting to rest a this time. Will attempt back as able for transfer training prior to discharge as appropriate/if tolerated by pt, discussed with RN, CC, and SW. Pt is appropriate for TCU at discharge to progress functional strength, safety and IND with mobility prior to return home.      "

## 2017-06-24 NOTE — PROGRESS NOTES
SW    Spoke with [place] regarding the referral status, Health had a few questions regarding the pt's weight bearing status. Notes indicate that pt should be NWB for a couple days and then can be WBAT. TCU would like a actual date that pt will be able to be WBAT. Also relayed the information that MD would like the facility to use a Terrie life with the pt.  [facility] still assessing the pt.    14:45  [facility] is able to take the pt for admission Sunday 6/25/17. Per RN will need to be NWB for 3 days then WBAT after that with the use of a Terrie. Updated RN regarding DC for Sunday. Transport arranged via [facility] at 12:00. Pt will need portable O2 for transport and completed PAS      Génesis Cramer, MSW, LGSW

## 2017-06-24 NOTE — PROGRESS NOTES
Care Transition Initial Assessment -   Reason For Consult: discharge planning  Met with: Patient    Active Problems:    Tibia/fibula fracture    Closed tibia fracture         DATA  Lives With: spouse  Living Arrangements: condominium  Description of Support System: Supportive, Involved  Who is your support system?: Wife  Support Assessment: Adequate family and caregiver support.   Identified issues/concerns regarding health management:       Quality Of Family Relationships: supportive, involved     Per social service protocol for discharge planning.  Patient was admitted on 6-23-17 after a fall resulting in a tibia/fibula fracture.  The tentative date of discharge is 6-24-17 or 6-25-17.  Patient is admitted under observation status.  Patient has had a recent inpatient hospital stay from June 13-June 17.  Reviewed chart.  Patient lives with his wife in a condo  Patient uses a rolling walker or a power chair at baseline depending on the distance.  Patient has grab bars, and a raised toilet seat in the bathroom.  Patient also has a hospital bed and a lift chair.  Patient has home oxygen at 2 liters from [facility].  Patient has a new home nebulizer machine.  Patient was open to homecare services from RN/PT/OT from [facility].  Patient is in agreement to go to tcu on discharge and he would prefer to go to [facility].  The second choice would be [facility].  Call placed to [facility] to check bed availability.  Per Stefanie, they are asking for a referral to be sent.   Patient would like transport arranged when discharge is known.  Patient understands that this will be private pay.      ASSESSMENT  Cognitive Status:  awake and alert  Concerns to be addressed: discharge planning.     PLAN  Financial costs for the patient includes Transportation costs.  Patient has a recent inpatient hospital stay therefore tcu would be covered.  Patient given options and choices for discharge tcu  choices.  Patient/family is agreeable to the plan?  Yes  Patient Goals and Preferences: tcu on discharge.  Patient anticipates discharging to:  TCU.  Discharge Planner   Discharge Plans in progress: yes  Barriers to discharge plan: none  Follow up plan: need to confirm a bed    Will confirm a bed, continue to follow, and assist with a safe discharge plan.    ARMEN Javed, [facility]  [phone]       Entered by: Rose Rizzo 06/24/2017 10:28 AM

## 2017-06-24 NOTE — PROGRESS NOTES
[facility]    Hospitalist Progress Note    Date of Service: 06/24/2017    Assessment & Plan   Ron Gilmore is an 76 year old male who presented on 6/23/2017 after a fall. He has a PMH of Type 2 DM and COPD among others. He was recently discharged from this facility after treatment for a COPD exacerbation. At that time he apparently refused TCU placement and elected for discharge home. He was at home ambulating with a walker when one of his legs gave out, a chronic problem since his stroke 9 years ago, and he fell.     He underwent x-ray of his right knee which demonstrated mildly displaced transverse fracture of the proximal left tibial shaft and proximal fibular shaft at the same level.  He was seen by Orthopedic Surgery who recommended nonsurgical management with a knee immobilizer and nonweightbearing status for a few days.      He also noted that he had been having some ongoing pain in his right foot since a previous fall and a right foot x-ray was obtained, which demonstrated a mildly displaced fracture fragment of the dorsal distal talus.  This was casted in the emergency department and given the patient's bilateral fractures, he was admitted for observation.     Finally, he noted a recent history of very dark urine. His PCP prescribed him Bactrim for this which he began taking prior to his arrival here    1. Mildly displaced transverse fracture of the proximal left tibial shaft and proximal fibular shaft  - Maintain knee immobilizer  - Non-weight bearing for 3 days, then WBAT  - PT/OT  - Follow up in office with orthopedics    2. Mildly displaced fracture fragment of the dorsal distal talus  - Casted in ED  - WBAT    3. Hematuria  - Started on bactrim by PCP  - UA here shows RBCs but no WBCs, however was already on antibiotics  - Would complete the course of bactrim given by his PCP. If he still has hematuria after that, however, he should see a urologist for o/p cysto    DVT Prophylaxis:  Enoxaparin (Lovenox) SQ  Code Status: Prior    Disposition: Expected discharge in 1 days once bed available at TCU.    Shan Kilpatrick III, MD  [phone] (C)  [phone] (P)  Text Page (7am to 6pm)  Please call or text with any questions or concerns.    Interval History   Feeling fine today. Was for discharge but delayed for placement issues. He has no complains except frustration about his immobility    -Data reviewed today: I reviewed all new labs and imaging results over the last 24 hours. I personally reviewed no images or EKG's today.    Physical Exam   Temp: 98.8  F (37.1  C) Temp src: Oral BP: 99/63 Pulse: 91 Heart Rate: 57 Resp: 16 SpO2: 96 % O2 Device: Nasal cannula Oxygen Delivery: 2 LPM  Vitals:    06/23/17 1541   Weight: 136.1 kg (300 lb)     Vital Signs with Ranges  Temp:  [98.1  F (36.7  C)-99  F (37.2  C)] 98.8  F (37.1  C)  Pulse:  [] 91  Heart Rate:  [] 57  Resp:  [16-20] 16  BP: ()/(58-74) 99/63  SpO2:  [93 %-98 %] 96 %  I/O last 3 completed shifts:  In: 780 [P.O.:780]  Out: 700 [Urine:700]    Constitutional: Awake, alert, cooperative, no apparent distress  Respiratory: Clear to auscultation bilaterally, no crackles or wheezing  Cardiovascular: Regular rate and rhythm, normal S1 and S2, and no murmur noted  GI: Normal bowel sounds, soft, non-distended, non-tender  Skin/Integumentary: No rashes, no cyanosis, no edema  Other: Both legs immobilized    Medications        neomycin-polymyxin-dexamethasone  1 drop Right Eye See Admin Instructions     ipratropium - albuterol 0.5 mg/2.5 mg/3 mL  3 mL Nebulization 4x Daily     metoprolol  12.5 mg Oral BID     PARoxetine (PAXIL) tablet 10 mg  10 mg Oral Daily     predniSONE  20 mg Oral Daily     tamsulosin  0.4 mg Oral BID     aspirin EC  81 mg Oral Daily       Data     Recent Labs  Lab 06/23/17  2100   HGB 14.6      POTASSIUM 4.4   CHLORIDE 100   CO2 31   BUN 24   CR 0.92   ANIONGAP 4   RAJ 8.4*   *        Imaging:  Recent Results (from the past 24 hour(s))   XR Knee Right 1/2 Views    Narrative    KNEE RIGHT ONE TO TWO VIEWS    6/23/2017 4:56 PM     HISTORY: Fall, pain    COMPARISON: None.    FINDINGS: Mildly displaced transverse fracture of the proximal left  tibial shaft and proximal fibular shaft at the same level.  Hypertrophic changes with medial compartmental narrowing of the left  knee. Arterial calcification posterior to the knee. Fluid in the  suprapatellar bursa.      Impression    IMPRESSION: Fracture of the proximal left tibia and fibula.    RUDDY LENNON MD   Foot  XR, G/E 3 views, right    Narrative    XR FOOT RT G/E 3 VW  6/23/2017 5:29 PM    HISTORY:  pain right foot    COMPARISON:  None.      Impression    IMPRESSION:  Diffuse osteopenia. Flexion at the IP joints of the  middle toe, very poorly visualized. Hallux valgus without significant  bunion formation. Degenerative changes at the first MTP joint. Osseous  density off the dorsal distal talus on the lateral view appears to  represent a mildly displaced fracture fragment, age indeterminate.  Correlate clinically.    KRYSTAL RAMÍREZ MD

## 2017-06-24 NOTE — H&P
DATE OF ADMISSION:  06/23/2017.      CHIEF COMPLAINT:  Fall.      HISTORY OF PRESENT ILLNESS:  Ron Gilmore is a 76 year-old male with past medical history as detailed below who presents with the above chief complaint.      The patient was recently hospitalized here from 06/13 through 06/17/2017 for treatment of COPD exacerbation and community-acquired pneumonia.  TCU was recommended on discharge at that time; however, patient declined, opting to instead discharge home with home therapies.  He reports that from a respiratory standpoint, he had been improving since that discharge.  He was working today with occupational therapy home services and had used his walker to get to the bathroom.  After using the bathroom and while walking back to the area that he was receiving therapy with his walker, he reports his right leg gave out and caused him to fall, landing awkwardly on his leg.  He reports that it is not uncommon for this to happen since his previous stroke and has had multiple falls as a result.  He denied any preceding or subsequent lightheadedness, chest pain, shortness of breath, focal numbness, tingling or weakness that was new.  He did not lose consciousness or hit his head.  Due to this fall, he sought further cares in the Emergency Department.      In the Emergency Department, the patient was seen by Dr. Nicole Ibrahim.  Initial vital signs were a temperature of 99.2 degrees Fahrenheit, heart rate 84 beats per minute, blood pressure 100/64, respiratory rate of 20, SpO2 90% on 2 liters nasal cannula.  He underwent x-ray of his right knee which demonstrated mildly displaced transverse fracture of the proximal left tibial shaft and proximal fibular shaft, same level.  He was seen by Orthopedic Surgery who recommended nonsurgical management with a knee immobilizer and nonweightbearing status.  He had noted that he had been having some ongoing pain in his right foot since a previous fall and a right foot x-ray was  recommended, which demonstrated likely a mildly displaced fracture fragment of the dorsal distal talus.  This was casted in the emergency department and given the patient's bilateral fractures, observation admission was requested to facilitate transfer to TCU for ongoing cares.  At present, the patient denies any significant pain.  He also notes in his history that he recently was noted to have very dark, almost maroon-colored urine the day prior to admission and was started on Bactrim for this purportedly by his primary care provider, although no notes regarding this are available in the chart at this time.  He has denied any fevers or chills, pain with urination, burning with urination, only notes the change in his urine color which he has had previously and has been treated in a similar fashion.      PAST MEDICAL HISTORY:   1.  History of cerebrovascular accident.   2.  Spinal stenosis.   3.  Obesity.   4.  Nephrolithiasis.   5.  Hypertension.   6.  Hyperlipidemia.   7.  Diabetes mellitus type 2.   8.  Depression.   9.  Chronic obstructive pulmonary disease.   10.  Cholelithiasis.      PAST SURGICAL HISTORY:   1.  Tonsillectomy.   2.  Laminectomy.   3.  Previous knee surgery.   4.  Cholecystectomy.   5.  Cataract surgery.   6.  Appendectomy.   7.  Eyelid surgery.      FAMILY HISTORY:  The patient does not know his father's medical history.  His mother  at age 99.      SOCIAL HISTORY:  He is a former 2-3 pack per day smoker, quit in .  He consumed alcohol fairly heavily up until  and has not had a drink since.  He currently lives with his wife independently.      ALLERGIES:  No known drug allergies.      REVIEW OF SYSTEMS:  Complete 10-point review of systems assessed and negative except as noted above.      PRIOR TO ADMISSION MEDICATIONS:  Per pharmacy medication reconciliation 17:  Prior to Admission medications    Medication Sig Last Dose Taking? Auth Provider   sulfamethoxazole-trimethoprim  (BACTRIM DS/SEPTRA DS) 800-160 MG per tablet Take 1 tablet by mouth 2 times daily For 10 days 6/23/2017 at am Yes Unknown, Entered By History   neomycin-polymyxin-dexamethasone (MAXITROL) 3.5-42860-1.1 SUSP ophthalmic susp Place 1 drop into the right eye See Admin Instructions Started 6/22/2017. 1 drop four times a day for 4 days, 1 drop three times a day for 3 days, 1 drop two times a day for 2 days, 1 drop once daily for 1 day and then stop 6/23/2017 at Unknown time Yes Unknown, Entered By History   albuterol (2.5 MG/3ML) 0.083% neb solution Take 1 vial (2.5 mg) by nebulization every 2 hours as needed for shortness of breath / dyspnea or other (dyspnea) prn Yes Wali Ibrahim MD   ipratropium - albuterol 0.5 mg/2.5 mg/3 mL (DUONEB) 0.5-2.5 (3) MG/3ML neb solution Take 1 vial (3 mLs) by nebulization 4 times daily 6/23/2017 at x1 Yes Wali Ibrahim MD   predniSONE (DELTASONE) 10 MG tablet 4 tabs daily for 3 days, then 3 tabs daily for 3 days, then 2 tabs daily for 3 days, then 1 tab daily for 3 days, then stop 6/23/2017 at am Yes Wali Ibrahim MD   HYDROcodone-acetaminophen (NORCO) 5-325 MG per tablet Take 2 tablets by mouth every 4 hours as needed for moderate to severe pain prn Yes Reported, Patient   ALLOPURINOL PO Take 300 mg by mouth daily 6/23/2017 at am Yes Unknown, Entered By History   PARoxetine HCl (PAXIL PO) Take 10 mg by mouth daily 6/23/2017 at am Yes Unknown, Entered By History   tamsulosin (FLOMAX) 0.4 MG 24 hr capsule Take 0.4 mg by mouth 2 times daily 6/23/2017 at am Yes Unknown, Entered By History   metoprolol (LOPRESSOR) 25 MG tablet Take 12.5 mg by mouth 2 times daily  6/23/2017 at am Yes     BABY ASPIRIN 81 MG OR CHEW 1 TABLET DAILY 6/23/2017 at am Yes Reported, Patient          PHYSICAL EXAMINATION:   VITAL SIGNS:  Temperature 99.2 degrees Fahrenheit, heart rate 78, blood pressure 97/58, respiratory rate 20, SpO2 of 95% on 2 liters nasal cannula.     GENERAL:  Elderly male in no acute distress.    HEENT:  Pupils equal, round, reactive to light, extraocular movements intact.   ENT:  Moist mucous membranes.  Oropharynx clear.   NECK:  Supple, no rigidity.   RESPIRATORY:  Lungs clear to auscultation bilaterally, normal effort at rest, no wheezes.   CARDIOVASCULAR:  Regular rate and rhythm, no murmurs, rubs or gallops.  No peripheral edema.   SKIN:  Warm, dry.   GASTROINTESTINAL:  Soft, nontender, nondistended.  Bowel sounds normoactive.  No rebound tenderness or guarding.   MUSCULOSKELETAL:  Tenderness to palpation over the left leg just below the knee.  Right foot is currently casted up to the level of the knee and not further examined.   PSYCHIATRIC:  Normal affect, appropriate.   NEUROLOGIC:  Alert.  Responding to questions appropriately.  Following all commands.      LABORATORY DATA AND IMAGING:  Reviewed in Epic.  Pertinents included in the HPI and assessment and plan where appropriate.      ASSESSMENT AND PLAN:  Ron Gilmore is a 76-year-old male with past medical history significant for COPD, history of stroke with recurrent falls, hypertension, hyperlipidemia, diabetes mellitus type 2, depression, recent admission for COPD and pneumonia where a transitional care unit had been recommended and patient elected to discharge home, who presents after a mechanical fall, found to have a nonsurgical tibia-fibula fracture as well as a right dorsal distal talus fracture.  He is registered to observation on 06/23/2017 for further workup and treatment.     1.  Mildly displaced proximal left tibial and fibular fracture, closed, initial encounter:  This occurred after a clear mechanical fall at home while using his walker.  He has a history of similar falls occurring due to his right leg giving out from deficits of prior stroke.  He landed awkwardly resulting in his fracture.  He has been seen by Orthopedic Surgery who have recommended nonsurgical management with a left knee mobilizer and nonweightbearing status.  He  was also found to have a right foot fracture as below and given the bilateral lower extremity fractures will need transitional care unit placement for ongoing cares which the patient acknowledges at this point.  Thankfully, he is having minimal pain.  In fact denies any pain at present.  Will have acetaminophen, oxycodone available for him should his pain increase.  Will  consult physical therapy as anticipate he will need some assistance learning the ways that he can transfer prior to discharge to transitional care unit.  Orthopedic Surgery will be formally consulted.   2.  Right dorsal distal talus fracture, closed:  The patient has had ongoing right foot pain since a fall preceding his previous admission.  His external exam was noted to be abnormal per Orthopedic Surgery and right foot x-ray revealed the above findings concerning for fracture.  He has been casted in the Emergency Department.  Orthopedic Surgery has been consulted as above.  Pain control as above.   3.  Chronic obstructive pulmonary disease: With recent exacerbation including community-acquired pneumonia.  He has completed his previous course of levofloxacin.  He reports that his respiratory status has been steadily improving since discharge.  He has no new findings to suggest exacerbation at this time.  Will continue his prednisone taper as previously outlined, including 20 mg daily for the next 2 days, at which point he should decrease to 10 mg for 3 days and then discontinue.  Will continue prior to admission scheduled DuoNeb and as needed albuterol nebs.  The patient was discharged with 2 liters of nasal cannula to continue at home.  Will continue this at present, although, it can be checked while here to determine if he still needs this.     4.  Hypertension:  Blood pressure is on the lower side in the Emergency Department, although asymptomatic.  Will continue his prior to admission metoprolol with hold parameters.   5.  Gout:  Can hold  prior to admission allopurinol while observation status.   6.  Dark urine:  The patient and his wife note very dark urine occurring the day prior to admission for which he had been started on Bactrim therapy per his primary care provider.  No notes are available to reflect this and he has not had a urinalysis done yet as he was due to have his wife bring this in to clinic.  He otherwise does not have any focal infectious urinary symptoms and is afebrile.  Will hold antibiotics at present in order to obtain a urinalysis and help determine if further antibiotic therapy is needed.  Will obtain a hemoglobin to ensure stability in the event he had any significant hematuria, although it does not sound like he did.   7.  Benign prostatic hypertrophy:  Continue prior to admission tamsulosin.   8.  Depression:  Continue prior to admission paroxetine.   9.  Diabetes mellitus type 2:  Does not appear to be on any medications for this.  He has not had a hemoglobin A1c checked since .  As this is a historical diagnosis per his medical record with no clear confirmation by hemoglobin A1c, would defer any further management to the outpatient setting.   10.  Suspected sleep apnea:  Noted during recent admission where he had an apneic spell.  It was recommended that he have an outpatient sleep study, continue to recommend this on discharge.        DVT PROPHYLAXIS:  Observation patient, short stay anticipated.      CODE STATUS:  Discussed with the patient, full code.      DISPOSITION:  Registered observation status.  Anticipate he will be able to discharge to TCU once placement is found.         JUAN GA MD             D: 2017 19:37   T: 2017 20:45   MT:       Name:     SHERI THORPE   MRN:      [id number]        Account:      [id number]   :      1940           Admitted:     [id number]      Document: [id number]       cc: Augustin Thomas MD

## 2017-06-24 NOTE — PROGRESS NOTES
Calling orthotics to get the hinged brace ordered by , awaiting answer. Gladys Oconnor,RN    Addendum- late entry: orthotics said they will be by shortly for brace. (6/24/17 @ 9:30am)

## 2017-06-24 NOTE — PROGRESS NOTES
S: Pt seen bedside [facility] room 509 with reports of tibia Fx and pain when he tries to move his leg. O: I see the EPIC order for hinged KO due to Tib Fx Left. A: I applied the Breg T-scope hinged knee brace and instructed patient on locking and unlocking with the red slide button at the knee. He seemed to understand. He reports he will not be weight bearing on that leg. He reports going to a transitional care unit. I also left him the manufacturers instruction sheet in case of swelling or volume change in his limb. P: FU PRN.

## 2017-06-24 NOTE — PROGRESS NOTES
Ortho    Re ck     Right  Foot  Films  Neg  For  fx     Right tibia  fx  Non  Displaced       jose  Hold  Off wt  Bearing  For  Few  Days  Then  wbat     Will have  A brace appilied       Can  Be  D/c  To  Tcu  Anytime

## 2017-06-24 NOTE — PROVIDER NOTIFICATION
Hospitalist paged regarding order for weight-bearing status. TCU placement pending based on orders. He will clarify orders in pt's chart.

## 2017-06-24 NOTE — DISCHARGE SUMMARY
[facility]    Discharge Summary  Hospitalist    Date of Admission:  6/23/2017  Date of Discharge:  6/25/2017  Discharging Provider: LUCIANO Rodríguez MD, FACP    Code Status   Full Code       Primary Care Physician   Augustin Thomas    Consultations This Hospital Stay   SOCIAL WORK IP CONSULT  PHYSICAL THERAPY ADULT IP CONSULT  ORTHOPEDIC SURGERY IP CONSULT  PHYSICAL THERAPY ADULT IP CONSULT  ORTHOSIS EXTREMITY LOWER REFERRAL IP CONSULT  SOCIAL WORK IP CONSULT  CARE COORDINATOR IP CONSULT  PHYSICAL THERAPY ADULT IP CONSULT  OCCUPATIONAL THERAPY ADULT IP CONSULT    Discharge Disposition   Discharged to short-term care facility [facility]  Condition at discharge: Stable; improving    Discharge Diagnoses    1. Fall  2. Left tibia/fibular fractures  3. Right foot/talus fracture  4. Diabetes mellitus, Type II  5. Obesity  6. Possible BRAD  7. Diarrhea, intermittent      History of Present Illness   Ron Gilmore is an 76 year old male who presented on 6/23/2017 after a fall. He has a PMH of Type 2 DM and COPD among others. He was recently discharged from this facility after treatment for a COPD exacerbation. At that time he apparently refused TCU placement and elected for discharge home. He was at home ambulating with a walker when one of his legs gave out, a chronic problem since his stroke 9 years ago, and he fell.    He underwent x-ray of his right knee which demonstrated mildly displaced transverse fracture of the proximal left tibial shaft and proximal fibular shaft at the same level.  He was seen by Orthopedic Surgery who recommended nonsurgical management with a knee immobilizer and nonweightbearing status for a few days.     He also noted that he had been having some ongoing pain in his right foot since a previous fall and a right foot x-ray was obtained, which demonstrated likely a mildly displaced fracture fragment of the dorsal distal talus.  This was casted in the emergency department  and given the patient's bilateral fractures, he was admitted for observation.    Finally, he noted a recent history of very dark urine. His PCP prescribed him Bactrim for this which he began taking prior to his arrival here; this was on hold briefly, and is being restarted at time of discharge.  Ron reports having intermittent loose stools over the past 1 or more days; Culturelle has been added BID on the day of discharge.  This issue will need to be re-addressed when he sees his PCP Dr. Thomas in follow-up.    Hospital Course   Orthopedics followed up with him after admission and confirmed their previous recommendations. A knee immobilizer was placed and discharge arrangements made for him to go to TCU where he will work with PT and OT.    Physical Exam   Temp: 98.2  F (36.8  C) Temp src: Oral BP: 100/67 Pulse: 88 Heart Rate: 57 Resp: 16 SpO2: 95 % O2 Device: Nasal cannula Oxygen Delivery: 2 LPM  Vitals:    06/23/17 1541   Weight: 136.1 kg (300 lb)     Vital Signs with Ranges  Temp:  [98.1  F (36.7  C)-99.2  F (37.3  C)] 98.2  F (36.8  C)  Pulse:  [] 88  Heart Rate:  [] 57  Resp:  [16-20] 16  BP: ()/(58-74) 100/67  SpO2:  [90 %-98 %] 95 %  I/O last 3 completed shifts:  In: 780 [P.O.:780]  Out: 700 [Urine:700]    Constitutional: Comfortable, lying in bed; Left leg immobilized  ENT: Atraumatic, membranes moist   Neck: Supple, good ROM  Respiratory: good a/e bilaterally; No wheezing or rhonchi  Cardiovascular: RRR; S1, S2 noted; no m/r/g  GI: obese; + bowel sounds; soft, non-tender  Skin: no rash; diffuse ecchymoses over much of Right foot/ankle  Neurologic: awake, conversant; follows directions well; no focal deficits    Additional Discharge orders (as of 6/25/17) -  1. F/U with Dr. Thomas within 1-2 weeks; with Primary Provider at [facility] in next several days  2. Fall precautions  3. Further eval of Diabetes at f/u with Dr. Thomas  4. Follow up with Podiatry as scheduled, in next several weeks  5.  Schedule outpatient Polysomnography to pop. For BRAD    Discharge Orders     POST-OP FOLLOW-UP VISIT   Re ck in  3  Weeks in  [place]  Office  Or  [facility]  office     General info for SNF   Length of Stay Estimate: Short Term Care:   Estimate  For  7  days  Condition at Discharge: Stable  Level of care:skilled   Rehabilitation Potential: Fair  Admission H&P remains valid and up-to-date: Yes  Recent Chemotherapy: N/A  Use Nursing Home Standing Orders: Yes     Activity - Up ad osmany     Activity - Up with assistive device     Activity - Ambulate in hallway   Every shift     Weight bearing status   wbat    No  rom  Of  Knee     General info for SNF   Length of Stay Estimate: Short Term Care: Estimated # of Days <30  Condition at Discharge: Stable  Level of care:skilled   Rehabilitation Potential: Excellent  Admission H&P remains valid and up-to-date: Yes  Recent Chemotherapy: N/A  Use Nursing Home Standing Orders: Yes     Mantoux instructions   Give two-step Mantoux (PPD) Per Facility Policy Yes     Weight bearing status   Non weight-bearing for 3 days, afterwards can progressively increase weight bearing as tolerated     Reason for your hospital stay   Mr. Gilmore came to us after a fall (See H+P). He suffered a mildly displaced proximal left tibial and fibular fracture, which orthopedics has recommended managing conservatively with a knee immobilizer. He was also found to have a mildly displaced fracture fragment of the dorsal distal talus from a prior fall, and this was casted in the emergency department. Given his bilateral leg immobilization at present, he will require skilled care and ongoing PT/OT at the TCU.     Full Code     Physical Therapy Adult Consult   Evaluate and treat as clinically indicated.    Reason:  wbat    No rom  Of the   Knee  On  fx  side     Physical Therapy Adult Consult   Evaluate and treat as clinically indicated.    Reason:  Fall, history of stroke     Occupational Therapy Adult Consult    Evaluate and treat as clinically indicated.    Reason:  History of stroke, already working with OT at home     Fall precautions       Discharge Medications   Current Discharge Medication List      START taking these medications    Details   oxyCODONE (ROXICODONE) 5 MG IR tablet Every  4  To  6  Hrs  5  -  10  mg  Qty: 80 tablet, Refills: 0    Associated Diagnoses: Tibia/fibula fracture, left, closed, initial encounter         CONTINUE these medications which have NOT CHANGED    Details   sulfamethoxazole-trimethoprim (BACTRIM DS/SEPTRA DS) 800-160 MG per tablet Take 1 tablet by mouth 2 times daily For 10 days      neomycin-polymyxin-dexamethasone (MAXITROL) 3.5-54001-4.1 SUSP ophthalmic susp Place 1 drop into the right eye See Admin Instructions Started 6/22/2017. 1 drop four times a day for 4 days, 1 drop three times a day for 3 days, 1 drop two times a day for 2 days, 1 drop once daily for 1 day and then stop      albuterol (2.5 MG/3ML) 0.083% neb solution Take 1 vial (2.5 mg) by nebulization every 2 hours as needed for shortness of breath / dyspnea or other (dyspnea)  Qty: 360 mL, Refills: 0    Associated Diagnoses: COPD exacerbation (H)      ipratropium - albuterol 0.5 mg/2.5 mg/3 mL (DUONEB) 0.5-2.5 (3) MG/3ML neb solution Take 1 vial (3 mLs) by nebulization 4 times daily  Qty: 120 mL, Refills: 0    Associated Diagnoses: COPD exacerbation (H)      predniSONE (DELTASONE) 10 MG tablet 4 tabs daily for 3 days, then 3 tabs daily for 3 days, then 2 tabs daily for 3 days, then 1 tab daily for 3 days, then stop  Qty: 30 tablet, Refills: 0    Associated Diagnoses: COPD exacerbation (H)      ALLOPURINOL PO Take 300 mg by mouth daily      PARoxetine HCl (PAXIL PO) Take 10 mg by mouth daily      tamsulosin (FLOMAX) 0.4 MG 24 hr capsule Take 0.4 mg by mouth 2 times daily      metoprolol (LOPRESSOR) 25 MG tablet Take 12.5 mg by mouth 2 times daily 1/2 25mg tablet= 12.5mg  Qty: 180 tablet, Refills: 3      BABY ASPIRIN 81  MG OR CHEW 1 TABLET DAILY      order for DME Equipment being ordered: Other: Home nebulizer  Treatment Diagnosis: COPD/Pneumonia  Qty: 1 Device, Refills: 0    Associated Diagnoses: COPD exacerbation (H)         STOP taking these medications       HYDROcodone-acetaminophen (NORCO) 5-325 MG per tablet Comments:   Reason for Stopping:             Allergies   No Known Allergies      Data   Results for orders placed or performed during the hospital encounter of 06/23/17   XR Knee Right 1/2 Views    Narrative    KNEE RIGHT ONE TO TWO VIEWS    6/23/2017 4:56 PM     HISTORY: Fall, pain    COMPARISON: None.    FINDINGS: Mildly displaced transverse fracture of the proximal left  tibial shaft and proximal fibular shaft at the same level.  Hypertrophic changes with medial compartmental narrowing of the left  knee. Arterial calcification posterior to the knee. Fluid in the  suprapatellar bursa.      Impression    IMPRESSION: Fracture of the proximal left tibia and fibula.    RUDDY LENNON MD   Foot  XR, G/E 3 views, right    Narrative    XR FOOT RT G/E 3 VW  6/23/2017 5:29 PM    HISTORY:  pain right foot    COMPARISON:  None.      Impression    IMPRESSION:  Diffuse osteopenia. Flexion at the IP joints of the  middle toe, very poorly visualized. Hallux valgus without significant  bunion formation. Degenerative changes at the first MTP joint. Osseous  density off the dorsal distal talus on the lateral view appears to  represent a mildly displaced fracture fragment, age indeterminate.  Correlate clinically.    KRYSTAL RAMÍREZ MD       Time Spent on this Encounter   ILUCIANO MD, FACP, personally saw the patient today and spent greater than 30 minutes discharging this patient

## 2017-06-24 NOTE — PROGRESS NOTES
Spoke to Dr. Velez who says he is medically cleared to discharge to TCU today, await orders. Gladys Oconnor RN

## 2017-06-24 NOTE — PROGRESS NOTES
Right  Foot  2 week old  Contusions   No restrictions   Can  D/c  Aces and  Cotton  Padding  As  Needed  Left tibia  fx    Hinged  Knee brace  Today nohemy in [employer]   On  Full time  camn  Go  To tcu  Anytime  ,  Tomorrow  Seems  Reasonable   Will need  To  Go  Where there is aleks lift  Capacity  As he is  Very  obese

## 2017-06-24 NOTE — PLAN OF CARE
Problem: Goal Outcome Summary  Goal: Goal Outcome Summary  Outcome: Improving  VSS, A&Ox4. CMS intact. Non-weight bearing on bilateral extremities. Denies any pain or pain interventions. Orthotics fit pt for left leg brace. Discharge pending placement at TCU. Will continue to monitor.

## 2017-06-25 ENCOUNTER — DOCUMENTATION ONLY (OUTPATIENT)
Dept: SLEEP MEDICINE | Facility: CLINIC | Age: 77
End: 2017-06-25

## 2017-06-25 VITALS
OXYGEN SATURATION: 98 % | TEMPERATURE: 98.1 F | BODY MASS INDEX: 40.63 KG/M2 | WEIGHT: 300 LBS | RESPIRATION RATE: 18 BRPM | SYSTOLIC BLOOD PRESSURE: 101 MMHG | HEART RATE: 94 BPM | DIASTOLIC BLOOD PRESSURE: 62 MMHG | HEIGHT: 72 IN

## 2017-06-25 LAB — GLUCOSE BLDC GLUCOMTR-MCNC: 95 MG/DL (ref 70–99)

## 2017-06-25 PROCEDURE — 94640 AIRWAY INHALATION TREATMENT: CPT

## 2017-06-25 PROCEDURE — 00000146 ZZHCL STATISTIC GLUCOSE BY METER IP

## 2017-06-25 PROCEDURE — 25000125 ZZHC RX 250: Performed by: INTERNAL MEDICINE

## 2017-06-25 PROCEDURE — 99217 ZZC OBSERVATION CARE DISCHARGE: CPT | Performed by: INTERNAL MEDICINE

## 2017-06-25 PROCEDURE — 40000893 ZZH STATISTIC PT IP EVAL DEFER

## 2017-06-25 PROCEDURE — 40000275 ZZH STATISTIC RCP TIME EA 10 MIN

## 2017-06-25 PROCEDURE — 25000132 ZZH RX MED GY IP 250 OP 250 PS 637: Performed by: INTERNAL MEDICINE

## 2017-06-25 PROCEDURE — G0378 HOSPITAL OBSERVATION PER HR: HCPCS

## 2017-06-25 RX ORDER — LACTOBACILLUS ACIDOPHILUS 500MM CELL
1 CAPSULE ORAL 2 TIMES DAILY
Qty: 14 CAPSULE | Refills: 1 | Status: ON HOLD | DISCHARGE
Start: 2017-06-25 | End: 2018-02-21

## 2017-06-25 RX ADMIN — TAMSULOSIN HYDROCHLORIDE 0.4 MG: 0.4 CAPSULE ORAL at 08:35

## 2017-06-25 RX ADMIN — ASPIRIN 81 MG: 81 TABLET, COATED ORAL at 08:35

## 2017-06-25 RX ADMIN — NEOMYCIN SULFATE, POLYMYXIN B SULFATE AND DEXAMETHASONE 1 DROP: 3.5; 10000; 1 SUSPENSION/ DROPS OPHTHALMIC at 08:38

## 2017-06-25 RX ADMIN — PAROXETINE HYDROCHLORIDE 10 MG: 10 TABLET, FILM COATED ORAL at 08:35

## 2017-06-25 RX ADMIN — PREDNISONE 20 MG: 20 TABLET ORAL at 08:35

## 2017-06-25 RX ADMIN — IPRATROPIUM BROMIDE AND ALBUTEROL SULFATE 3 ML: .5; 3 SOLUTION RESPIRATORY (INHALATION) at 11:31

## 2017-06-25 RX ADMIN — NEOMYCIN SULFATE, POLYMYXIN B SULFATE AND DEXAMETHASONE 1 DROP: 3.5; 10000; 1 SUSPENSION/ DROPS OPHTHALMIC at 12:57

## 2017-06-25 RX ADMIN — METOPROLOL TARTRATE 12.5 MG: 25 TABLET, FILM COATED ORAL at 08:35

## 2017-06-25 NOTE — PLAN OF CARE
Problem: Goal Outcome Summary  Goal: Goal Outcome Summary  A&O. VSS on 2L O2. Pt declined to reposition in bed overnight. Discomfort with movement only, declines pain medication. CMS intact. Bilateral LE with ace wraps. Immobilizer in place LLE. NWB LLE. Voiding adequately. Slept between cares. Plan for TCU at discharge.

## 2017-06-25 NOTE — PLAN OF CARE
Problem: Goal Outcome Summary  Goal: Goal Outcome Summary  Outcome: Improving  Pt doing well tonight, no c/o pain.  Pt turning independently, eye drops given.  Will continue to monitor.

## 2017-06-25 NOTE — PROGRESS NOTES
Social Work Progress Note  Pt chart reviewed. Pt discussed in interdisciplinary rounds.     Intervention: Pt's transportation was rescheduled for 1530 due to 02 orders not being ready. Per bedside RN, pt is now weaned off 02. Writer completed PAS, and faxed it alongside D/C orders to [place]. Writer met with pt and updated him of the above. Pt verbalized that he has not been out of bed since admissions. Writer asked bedside RN to try and have pt sit up in a chair prior to w/c  transportation arriving.   PAS-RR    D: Per DHS regulation, SW completed and submitted PAS-RR to [place] [employer] Direct Connect via the [employer] Line.  PAS-RR confirmation # is : [id number]  P: Further questions may be directed to [employer] at #[phone], option #4 for PAS-RR staff.    Team members notified: Bedside RN, CC, & HUC    Plan:  Anticipated Discharge Placement: [facility] (P: [phone]; F: [phone])  Barriers: None identified at this time.   Follow-up plan: Jonathan to continue to follow.     Mateo Tabares  [id number]    ADDENDUM:   Per bedside RN, pt was put on 2L NC. Pt is open to home 02 with [facility]. Liv with [facility] confirmed that they will deliver a 02 tank for pt prior to his d/c at 1530.     Mateo Tabares, ARMEN, [facility]  [phone]

## 2017-06-25 NOTE — PLAN OF CARE
Problem: Goal Outcome Summary  Goal: Goal Outcome Summary  Outcome: Improving  Pt alert and oriented x 4. Pt denied pain. Bilateral LE with ace wraps. Hinge brace on left lower extremity. No weight bearing on LLE. Pt refused his doses of metoprolol. Will continue to monitor

## 2017-06-25 NOTE — PROGRESS NOTES
D/c  Today  Planned    Now  Accepts  Tcu    Brace  Was to have  Been locked in extension    The  Posterior  Splint  Was left on  And needs to be  Removed     wbat  In  Brace  With knee held in  Full extension

## 2017-06-25 NOTE — PLAN OF CARE
Problem: Goal Outcome Summary  Goal: Goal Outcome Summary  Outcome: Improving  VSS on 2L O2, A&Ox4. CMS intact. Splint removed from right foot. Pt up to chair A2 w/ lift. Denies pain and any pain interventions. Plan in place to transfer to [facility] at 1530. Will continue to monitor.

## 2017-06-25 NOTE — PLAN OF CARE
Problem: Goal Outcome Summary  Goal: Goal Outcome Summary  PT: PT eval not completed during current admission, pt declined 6/24/17 and safe discharge plan in place for TCU for today, 6/25/17, for participation in therapies prior to return home, no acute PT eval needed, RN used lift to transfer pt to assess sitting tolerance, Defered eval to TCU.

## 2017-06-25 NOTE — PROGRESS NOTES
Rec'd page from JEROME Galindo at 1:33p that patient needed an oxygen tank. Returned [facility]'s call at 1:39p and she stated that patient recently rec'd oxygen through [facility] and he needs a transport tank delivered to oxygen prior to 3:30p. Advised [facility] that transport tank would be delivered prior to 3:30p when his ride would be picking him up. Liliana will be delivering the transport tank.

## 2017-06-26 ENCOUNTER — NURSING HOME VISIT (OUTPATIENT)
Dept: GERIATRICS | Facility: CLINIC | Age: 77
End: 2017-06-26
Payer: COMMERCIAL

## 2017-06-26 VITALS
SYSTOLIC BLOOD PRESSURE: 123 MMHG | DIASTOLIC BLOOD PRESSURE: 84 MMHG | BODY MASS INDEX: 41.08 KG/M2 | WEIGHT: 303.3 LBS | HEART RATE: 78 BPM | OXYGEN SATURATION: 97 % | RESPIRATION RATE: 20 BRPM | HEIGHT: 72 IN | TEMPERATURE: 97.5 F

## 2017-06-26 DIAGNOSIS — R31.9 HEMATURIA: ICD-10-CM

## 2017-06-26 DIAGNOSIS — E66.01 MORBID OBESITY, UNSPECIFIED OBESITY TYPE (H): ICD-10-CM

## 2017-06-26 DIAGNOSIS — R53.81 PHYSICAL DECONDITIONING: ICD-10-CM

## 2017-06-26 DIAGNOSIS — S82.402D TIBIA/FIBULA FRACTURE, LEFT, CLOSED, WITH ROUTINE HEALING, SUBSEQUENT ENCOUNTER: Primary | ICD-10-CM

## 2017-06-26 DIAGNOSIS — S92.101D CLOSED DISPLACED FRACTURE OF RIGHT TALUS WITH ROUTINE HEALING, UNSPECIFIED PORTION OF TALUS, SUBSEQUENT ENCOUNTER: ICD-10-CM

## 2017-06-26 DIAGNOSIS — J44.9 CHRONIC OBSTRUCTIVE PULMONARY DISEASE, UNSPECIFIED COPD TYPE (H): ICD-10-CM

## 2017-06-26 DIAGNOSIS — S82.202D TIBIA/FIBULA FRACTURE, LEFT, CLOSED, WITH ROUTINE HEALING, SUBSEQUENT ENCOUNTER: Primary | ICD-10-CM

## 2017-06-26 PROCEDURE — 99310 SBSQ NF CARE HIGH MDM 45: CPT | Performed by: NURSE PRACTITIONER

## 2017-06-26 NOTE — PROGRESS NOTES
[place] [facility]  PRIMARY CARE PROVIDER AND CLINIC:  [facility] [address] / [place]*  Chief Complaint   Patient presents with     Hospital F/U       HPI:    Ron Gilmore is a 76 year old  (1940), PMH of COPD, recent exacerbation and hospitalization declined TCU placement went home and fell presented to ER with pain admitted to the [facility] from [facility].  Hospital stay 06/23/2017 through 06/25/2017.   Left tib/ fib Fx: proximal left tibial shaft and fibular shaft nonsurgical management, continue knee immobilizer in full extension WBAT f/u with ortho Dr. Verduzco in 2-3 weeks  Right foot dorsal distal talus Fx: casted in the ED but later cleared for WBAT no immobilization per Dr. Verduzco  Hematuria: started on bactrim as OP by PCP, UA shows blood but no WBC, finish coarse of Abx, f/u with urology if hematuria persists    Admitted to this facility for  rehab, medical management and nursing care.  Current issues are: On exam today patient is alert, pleasant, obese, he is on O2 and wants to know when he will get off, states he has ROSS but feels his breathing is at his baseline, he is coughing with productive yellow sputum, audible upper airway congestion, denies fever, chills, CP, SOB at rest, he is not on O2 at home or any inhalers for COPD, patient states pain in left lower leg is rated 2/10 well controlled at this time and patient denies pain in right foot, no other concerns at this time.        Last 3 BP's: 123/84, 127/78, 122/78  HR Range: 78-90 bpm  Admission Weight: 303.3 lbs (6/25)    CODE STATUS/ADVANCE DIRECTIVES DISCUSSION:   CPR/Full code   Patient's living condition: lives with spouse    ALLERGIES:Review of patient's allergies indicates no known allergies.  PAST MEDICAL HISTORY:  has a past medical history of Cataract; Cholelithiasis; COPD (chronic obstructive pulmonary disease) (H); Depression; Diabetes  mellitus; Hyperlipidemia; Hypertension; Kidney stones; Obesity; Spinal stenosis; and Stroke (H). He also has no past medical history of Difficult intubation; Malignant hyperthermia; PONV (postoperative nausea and vomiting); or Spinal headache.  PAST SURGICAL HISTORY:  has a past surgical history that includes Laminectomy lumbar one level; joint replacement; knee surgery; Appendectomy open; Tonsillectomy; Arthroscopy shoulder rotator cuff repair; cataracts; Cystoscopy (10/19/2011); Cholecystectomy; and Eye surgery.  FAMILY HISTORY: family history is not on file.  SOCIAL HISTORY:  reports that he has quit smoking. He has never used smokeless tobacco. He reports that he does not drink alcohol or use illicit drugs.    Post Discharge Medication Reconciliation Status: discharge medications reconciled, continue medications without change.  Current Outpatient Prescriptions   Medication Sig Dispense Refill     OXYCODONE HCL PO Take 5-10 mg by mouth Every 4-6 hours as needed.       Acidophilus Lactobacillus CAPS Take 1 packet by mouth 2 times daily 14 capsule 1     sulfamethoxazole-trimethoprim (BACTRIM DS/SEPTRA DS) 800-160 MG per tablet Take 1 tablet by mouth 2 times daily For 10 days       neomycin-polymyxin-dexamethasone (MAXITROL) 3.5-05370-5.1 SUSP ophthalmic susp Place 1 drop into the right eye See Admin Instructions Started 6/22/2017. 1 drop four times a day for 4 days, 1 drop three times a day for 3 days, 1 drop two times a day for 2 days, 1 drop once daily for 1 day and then stop       albuterol (2.5 MG/3ML) 0.083% neb solution Take 1 vial (2.5 mg) by nebulization every 2 hours as needed for shortness of breath / dyspnea or other (dyspnea) 360 mL 0     ipratropium - albuterol 0.5 mg/2.5 mg/3 mL (DUONEB) 0.5-2.5 (3) MG/3ML neb solution Take 1 vial (3 mLs) by nebulization 4 times daily 120 mL 0     predniSONE (DELTASONE) 10 MG tablet 4 tabs daily for 3 days, then 3 tabs daily for 3 days, then 2 tabs daily for 3 days,  then 1 tab daily for 3 days, then stop 30 tablet 0     order for DME Equipment being ordered: Other: Home nebulizer  Treatment Diagnosis: COPD/Pneumonia 1 Device 0     ALLOPURINOL PO Take 300 mg by mouth daily       PARoxetine HCl (PAXIL PO) Take 10 mg by mouth daily       tamsulosin (FLOMAX) 0.4 MG 24 hr capsule Take 0.4 mg by mouth 2 times daily       metoprolol (LOPRESSOR) 25 MG tablet Take 12.5 mg by mouth 2 times daily 1/2 25mg tablet= 12.5mg 180 tablet 3     BABY ASPIRIN 81 MG OR CHEW 1 TABLET DAILY         ROS:  10 point ROS of systems including Constitutional, Eyes, Respiratory, Cardiovascular, Gastroenterology, Genitourinary, Integumentary, Muscularskeletal, Psychiatric were all negative except for pertinent positives noted in my HPI.    Exam:  /84  Pulse 78  Temp 97.5  F (36.4  C)  Resp 20  Ht 6' (1.829 m)  Wt (!) 303 lb 4.8 oz (137.6 kg)  SpO2 97%  BMI 41.13 kg/m2  GENERAL APPEARANCE:  Alert, in no distress, morbidly obese  ENT:  Mouth and posterior oropharynx normal, moist mucous membranes, normal hearing acuity  EYES:  EOM, conjunctivae, lids, pupils and irises normal, PERRL  RESP:  respiratory effort and palpation of chest normal, no respiratory distress, diminished breath sounds bases bilaterally, rhonchi throughout fields, upper airway congestion audible  CV:  Palpation and auscultation of heart done , regular rate and rhythm, no murmur, rub, or gallop, peripheral edema trace+ in RLE  ABDOMEN:  normal bowel sounds, soft, nontender, no hepatosplenomegaly or other masses  M/S:   Examination of:   right upper extremity, left upper extremity and right lower extremity  Inspection, ROM, stability and muscle strength normal and left knee brace in full extension  SKIN:  Inspection of skin and subcutaneous tissue baseline  NEURO:   Cranial nerves 2-12 are normal tested and grossly at patient's baseline, speech WNL    Lab/Diagnostic data:  CBC RESULTS:   Recent Labs   Lab Test  06/23/17   2100   06/14/17   0717  06/13/17   0350   WBC   --   11.7*  10.0   RBC   --   4.18*  4.63   HGB  14.6  12.9*  14.2   HCT   --   40.1  44.2   MCV   --   96  96   MCH   --   30.9  30.7   MCHC   --   32.2  32.1   RDW   --   14.8  14.6   PLT   --   165  159       Last Basic Metabolic Panel:  Recent Labs   Lab Test  06/23/17   2100  06/13/17   0350   NA  135  137   POTASSIUM  4.4  4.0   CHLORIDE  100  100   RAJ  8.4*  8.9   CO2  31  29   BUN  24  15   CR  0.92  0.82   GLC  152*  129*       Liver Function Studies -   Recent Labs   Lab Test  10/25/15   1005  10/25/15   0817   PROTTOTAL  6.2*  Unsatisfactory specimen - hemolyzed  CALLED TO [facility] ON [address] AT 0845     ALBUMIN  2.9*  Unsatisfactory specimen - hemolyzed  CALLED TO [facility] [address] AT 0845     BILITOTAL  0.8  Unsatisfactory specimen - hemolyzed  CALLED TO [facility] [address] AT 0845     ALKPHOS  48  Unsatisfactory specimen - hemolyzed  CALLED TO [facility] [address] AT 0845     AST  18  Unsatisfactory specimen - hemolyzed  CALLED TO [facility] [address] AT 0845     ALT  31  Unsatisfactory specimen - hemolyzed  CALLED TO [facility] [address] AT 0845         Lab Results   Component Value Date    A1C 5.9 07/14/2006           ASSESSMENT/PLAN:  Tibia/fibula fracture, left, closed, with routine healing, subsequent encounter  Fibula Fx: WBAT in full extension knee brace, F/u with Dr. Verduzco in 2-3 weeks  Oxycodone 5-10mg q 4 hours prn, will start scheduled tylenol 650mg TID    Closed displaced fracture of right talus with routine healing, unspecified portion of talus, subsequent encounter  WBAT, f/u with Dr. Verduzco, pain medications as above    Physical deconditioning  Acute: PT and OT for strengthening, WBAT on both legs    Hematuria  Acute: finished coarse of bactrim in hospital, will continue to monitor, may need to f/u with urology if hematuria persists    Chronic obstructive pulmonary disease, unspecified COPD type (H)  Morbid obesity, unspecified obesity type (H)  Recent acute exacerbation  Monitor  SaO2 at rest and with activity, wean off O2 to keep SaO2 > 90%,  continue duonebs QID, albuterol neb q 2 hours prn, on prednisone taper  May want to start inhaled steroid and spiriva             Orders:  BMP and Hgb in AM  Tylenol 650mg TID scheduled  Wean off O2 keep SAO2 . 90%    Information reviewed:  Medications, vital signs, orders, nursing notes, problem list, hospital information. Total time spent with patient visit was 45 min including patient visit and review of past records. Greater than 50% of total time spent with counseling and coordinating care.    Electronically signed by:  Tonya Lynn Haase, APRN CNP

## 2017-06-27 ENCOUNTER — NURSING HOME VISIT (OUTPATIENT)
Dept: GERIATRICS | Facility: CLINIC | Age: 77
End: 2017-06-27
Payer: COMMERCIAL

## 2017-06-27 ENCOUNTER — TRANSFERRED RECORDS (OUTPATIENT)
Dept: HEALTH INFORMATION MANAGEMENT | Facility: CLINIC | Age: 77
End: 2017-06-27

## 2017-06-27 DIAGNOSIS — R31.9 HEMATURIA: ICD-10-CM

## 2017-06-27 DIAGNOSIS — S82.202D TIBIA/FIBULA FRACTURE, LEFT, CLOSED, WITH ROUTINE HEALING, SUBSEQUENT ENCOUNTER: Primary | ICD-10-CM

## 2017-06-27 DIAGNOSIS — J44.9 CHRONIC OBSTRUCTIVE PULMONARY DISEASE, UNSPECIFIED COPD TYPE (H): ICD-10-CM

## 2017-06-27 DIAGNOSIS — S82.402D TIBIA/FIBULA FRACTURE, LEFT, CLOSED, WITH ROUTINE HEALING, SUBSEQUENT ENCOUNTER: Primary | ICD-10-CM

## 2017-06-27 LAB
BUN SERPL-MCNC: 22 MG/DL (ref 9–26)
CALCIUM SERPL-MCNC: 8.5 MG/DL (ref 8.4–10.2)
CHLORIDE SERPLBLD-SCNC: 100 MMOL/L (ref 98–109)
CO2 SERPL-SCNC: 29 MMOL/L (ref 22–31)
CREAT SERPL-MCNC: 0.92 MG/DL (ref 0.73–1.18)
GFR SERPL CREATININE-BSD FRML MDRD: >60 ML/MIN/1.73M2
GLUCOSE SERPL-MCNC: 96 MG/DL (ref 70–100)
HEMOGLOBIN: 11.1 G/DL (ref 13.4–17.5)
POTASSIUM SERPL-SCNC: 4.4 MMOL/L (ref 3.5–5.2)
SODIUM SERPL-SCNC: 135 MMOL/L (ref 136–145)

## 2017-06-27 PROCEDURE — 99306 1ST NF CARE HIGH MDM 50: CPT | Performed by: INTERNAL MEDICINE

## 2017-06-27 PROCEDURE — 99207 ZZC CDG-CODE INCORRECT PER BILLING BASED ON TIME: CPT | Performed by: INTERNAL MEDICINE

## 2017-06-28 ENCOUNTER — CARE COORDINATION (OUTPATIENT)
Dept: CASE MANAGEMENT | Facility: CLINIC | Age: 77
End: 2017-06-28

## 2017-06-29 NOTE — PROGRESS NOTES
[place] [facility]  PRIMARY CARE PROVIDER AND CLINIC:  [facility] [address] / [place]*    Pt was seen by Dr Garland on 6/27/17 for an initial TCU visit    HPI:    Ron Gilmore is a 76 year old  (1940), PMH of COPD, with a recent exacerbation and hospitalization, who was hospitalized at [facility] from 6/23/17-6/25/17 for the management of a L tib/fib fracture and R distal talus fracture suffered in a fall at home.    Pt's injuries were treated non-surgically.    Hospital course reviewed by me, is as per the hospital d/c summary and NP note.    Pt was found to have microscopic hematuria, had already been started on Septra as outpt.  Unclear if UC was obtained prior to antibiotic tx.  CT Abd from 6/13/17 incidentally revealed small R kidney stone, no other abnormality     Admitted to this facility for  rehab, medical management and nursing care.    Pt reports minimal L LE pain  He has an occ non-productive cough  He states, ROSS is at baseline  He denies hematuria  He remains on 02 (not on 02 at baseline)       CODE STATUS/ADVANCE DIRECTIVES DISCUSSION:   CPR/Full code   Patient's living condition: lives with spouse    ALLERGIES:Review of patient's allergies indicates no known allergies.  PAST MEDICAL HISTORY:  has a past medical history of Cataract; Cholelithiasis; COPD (chronic obstructive pulmonary disease) (H); Depression; Diabetes mellitus; Hyperlipidemia; Hypertension; Kidney stones; Obesity; Spinal stenosis; and Stroke (H). He also has no past medical history of Difficult intubation; Malignant hyperthermia; PONV (postoperative nausea and vomiting); or Spinal headache.  PAST SURGICAL HISTORY:  has a past surgical history that includes Laminectomy lumbar one level; joint replacement; knee surgery; Appendectomy open; Tonsillectomy; Arthroscopy shoulder rotator cuff repair; cataracts; Cystoscopy (10/19/2011); Cholecystectomy; and Eye surgery.  FAMILY HISTORY:  family history is not on file.  SOCIAL HISTORY:  reports that he has quit smoking. He has never used smokeless tobacco. He reports that he does not drink alcohol or use illicit drugs.        Post Discharge Medication Reconciliation Status:   .  Current Outpatient Prescriptions   Medication Sig Dispense Refill     OXYCODONE HCL PO Take 5-10 mg by mouth Every 4-6 hours as needed.       Acidophilus Lactobacillus CAPS Take 1 packet by mouth 2 times daily 14 capsule 1     sulfamethoxazole-trimethoprim (BACTRIM DS/SEPTRA DS) 800-160 MG per tablet Take 1 tablet by mouth 2 times daily For 10 days       neomycin-polymyxin-dexamethasone (MAXITROL) 3.5-19393-1.1 SUSP ophthalmic susp Place 1 drop into the right eye See Admin Instructions Started 6/22/2017. 1 drop four times a day for 4 days, 1 drop three times a day for 3 days, 1 drop two times a day for 2 days, 1 drop once daily for 1 day and then stop       albuterol (2.5 MG/3ML) 0.083% neb solution Take 1 vial (2.5 mg) by nebulization every 2 hours as needed for shortness of breath / dyspnea or other (dyspnea) 360 mL 0     ipratropium - albuterol 0.5 mg/2.5 mg/3 mL (DUONEB) 0.5-2.5 (3) MG/3ML neb solution Take 1 vial (3 mLs) by nebulization 4 times daily 120 mL 0     predniSONE (DELTASONE) 10 MG tablet 4 tabs daily for 3 days, then 3 tabs daily for 3 days, then 2 tabs daily for 3 days, then 1 tab daily for 3 days, then stop 30 tablet 0     order for DME Equipment being ordered: Other: Home nebulizer  Treatment Diagnosis: COPD/Pneumonia 1 Device 0     ALLOPURINOL PO Take 300 mg by mouth daily       PARoxetine HCl (PAXIL PO) Take 10 mg by mouth daily       tamsulosin (FLOMAX) 0.4 MG 24 hr capsule Take 0.4 mg by mouth 2 times daily       metoprolol (LOPRESSOR) 25 MG tablet Take 12.5 mg by mouth 2 times daily 1/2 25mg tablet= 12.5mg 180 tablet 3     BABY ASPIRIN 81 MG OR CHEW 1 TABLET DAILY         ROS:  10 point ROS neg except as noted aboe.I.    Exam:    GENERAL APPEARANCE:   Alert, in no distress, morbidly obese, lying in bed  ENT:  Mouth and posterior oropharynx normal, moist mucous membranes, normal hearing acuity  EYES:  EOM, conjunctivae, lids, pupils and irises normal, PERRL  RESP:  RR 20, unlabored, lungs clear, BS diminished B  CV:  RRR no M  ABDOMEN:  Soft, obese  M/S:  L knee immobilizer in place, trace edema both feet.  NEURO:   Cranial nerves 2-12 are normal tested and grossly at patient's baseline, speech WNL    Lab/Diagnostic data:  CBC RESULTS:   Recent Labs   Lab Test  06/23/17 2100 06/14/17   0717  06/13/17   0350   WBC   --   11.7*  10.0   RBC   --   4.18*  4.63   HGB  14.6  12.9*  14.2   HCT   --   40.1  44.2   MCV   --   96  96   MCH   --   30.9  30.7   MCHC   --   32.2  32.1   RDW   --   14.8  14.6   PLT   --   165  159       Last Basic Metabolic Panel:  Recent Labs   Lab Test  06/23/17 2100 06/13/17   0350   NA  135  137   POTASSIUM  4.4  4.0   CHLORIDE  100  100   RAJ  8.4*  8.9   CO2  31  29   BUN  24  15   CR  0.92  0.82   GLC  152*  129*       Liver Function Studies -   Recent Labs   Lab Test  10/25/15   1005  10/25/15   0817   PROTTOTAL  6.2*  Unsatisfactory specimen - hemolyzed  CALLED TO [facility] ON [address] AT 0845     ALBUMIN  2.9*  Unsatisfactory specimen - hemolyzed  CALLED TO [facility] ON [address] AT 0845     BILITOTAL  0.8  Unsatisfactory specimen - hemolyzed  CALLED TO [facility] ON [address] AT 0845     ALKPHOS  48  Unsatisfactory specimen - hemolyzed  CALLED TO [facility] ON [address] AT 0845     AST  18  Unsatisfactory specimen - hemolyzed  CALLED TO [facility] ON [address] AT 0845     ALT  31  Unsatisfactory specimen - hemolyzed  CALLED TO [facility] ON [address] AT 0845         Lab Results   Component Value Date    A1C 5.9 07/14/2006           ASSESSMENT/PLAN:    Tibia/fibula fracture, left, closed, with routine healing, subsequent encounter  Fibula Fx: WBAT in full extension knee brace, F/u with Dr. Verduzco in 2-3 weeks  Oxycodone 5-10mg q 4 hours prn, will start scheduled tylenol 650mg  TID    Closed displaced fracture of right talus with routine healing, unspecified portion of talus, subsequent encounter  WBAT,    Physical deconditioning  Acute: PT and OT for strengthening, WBAT on both legs    Hematuria  Acute: finished coarse of bactrim in hospital, will continue to monitor, may need to f/u with urology if hematuria persists. May be related to renal stone (as noted on CT abd)    Chronic obstructive pulmonary disease, unspecified COPD type (H)  Morbid obesity, unspecified obesity type (H)  Recent acute exacerbation, improved  Monitor SaO2 at rest and with activity, wean off O2 to keep SaO2 > 90%, continue duonebs QID, albuterol neb q 2 hours prn, continue prednisone taper that was started during previous hospitalization.      Nic Garland MD               Orders:  BMP and Hgb in AM  Tylenol 650mg TID scheduled  Wean off O2 keep SAO2 . 90%    Information reviewed:  Medications, vital signs, orders, nursing notes, problem list, hospital information. Total time spent with patient visit was 45 min including patient visit and review of past records. Greater than 50% of total time spent with counseling and coordinating care.    Electronically signed by:  Nic Garland MD

## 2017-07-07 ENCOUNTER — NURSING HOME VISIT (OUTPATIENT)
Dept: GERIATRICS | Facility: CLINIC | Age: 77
End: 2017-07-07
Payer: COMMERCIAL

## 2017-07-07 VITALS
TEMPERATURE: 97.5 F | RESPIRATION RATE: 18 BRPM | DIASTOLIC BLOOD PRESSURE: 80 MMHG | OXYGEN SATURATION: 95 % | HEART RATE: 87 BPM | SYSTOLIC BLOOD PRESSURE: 135 MMHG

## 2017-07-07 DIAGNOSIS — E11.9 TYPE 2 DIABETES MELLITUS WITHOUT COMPLICATION, WITHOUT LONG-TERM CURRENT USE OF INSULIN (H): Primary | ICD-10-CM

## 2017-07-07 DIAGNOSIS — S82.402D TIBIA/FIBULA FRACTURE, LEFT, CLOSED, WITH ROUTINE HEALING, SUBSEQUENT ENCOUNTER: ICD-10-CM

## 2017-07-07 DIAGNOSIS — R06.00 DYSPNEA, UNSPECIFIED TYPE: ICD-10-CM

## 2017-07-07 DIAGNOSIS — Z87.01 HISTORY OF PNEUMONIA: ICD-10-CM

## 2017-07-07 DIAGNOSIS — S82.202D TIBIA/FIBULA FRACTURE, LEFT, CLOSED, WITH ROUTINE HEALING, SUBSEQUENT ENCOUNTER: ICD-10-CM

## 2017-07-07 PROCEDURE — 99309 SBSQ NF CARE MODERATE MDM 30: CPT | Performed by: NURSE PRACTITIONER

## 2017-07-07 RX ORDER — IPRATROPIUM BROMIDE AND ALBUTEROL SULFATE 2.5; .5 MG/3ML; MG/3ML
1 SOLUTION RESPIRATORY (INHALATION) 2 TIMES DAILY
COMMUNITY
End: 2017-07-07

## 2017-07-07 NOTE — MR AVS SNAPSHOT
"              After Visit Summary   7/7/2017    Ron Gilmore    MRN: [id number]           Patient Information     Date Of Birth          1940        Visit Information        Provider Department      7/7/2017 11:00 AM Katerina Brown APRN CNP [facility]        Today's Diagnoses     Type 2 diabetes mellitus without complication, without long-term current use of insulin (H)    -  1    Dyspnea, unspecified type        History of pneumonia        Tibia/fibula fracture, left, closed, with routine healing, subsequent encounter           Follow-ups after your visit        Who to contact     If you have questions or need follow up information about today's clinic visit or your schedule please contact G[facility] directly at [phone].  Normal or non-critical lab and imaging results will be communicated to you by [email or web address], letter or phone within 4 business days after the clinic has received the results. If you do not hear from us within 7 days, please contact the clinic through [email or web address] or phone. If you have a critical or abnormal lab result, we will notify you by phone as soon as possible.  Submit refill requests through [employer] or call your pharmacy and they will forward the refill request to us. Please allow 3 business days for your refill to be completed.          Additional Information About Your Visit        MyChart Information     [employer] lets you send messages to your doctor, view your test results, renew your prescriptions, schedule appointments and more. To sign up, go to [email or web address] . Click on \"Log in\" on the left side of the screen, which will take you to the Welcome page. Then click on \"Sign up Now\" on the right side of the page.     You will be asked to enter the access code listed below, as well as some personal information. Please follow the directions to create your username and password.     Your access code is: [email or web address]  Expires: 9/15/2017 10:52 " AM     Your access code will  in 90 days. If you need help or a new code, please call your [facility] clinic or [phone].        Care EveryWhere ID     This is your Care EveryWhere ID. This could be used by other organizations to access your [facility] medical records  [id number]        Your Vitals Were     Pulse Temperature Respirations Pulse Oximetry          87 97.5  F (36.4  C) 18 95%         Blood Pressure from Last 3 Encounters:   17 135/80   17 123/84   17 101/62    Weight from Last 3 Encounters:   17 (!) 303 lb 4.8 oz (137.6 kg)   17 300 lb (136.1 kg)   17 (!) 308 lb 10.3 oz (140 kg)              Today, you had the following     No orders found for display         Today's Medication Changes          These changes are accurate as of: 17 12:24 PM.  If you have any questions, ask your nurse or doctor.               Stop taking these medicines if you haven't already. Please contact your care team if you have questions.     ipratropium - albuterol 0.5 mg/2.5 mg/3 mL 0.5-2.5 (3) MG/3ML neb solution   Commonly known as:  DUONEB   Stopped by:  Katerina Brown APRN CNP                    Primary Care Provider Office Phone # Fax #    Augustin Thomas [phone]       [facility] [address]  [place] [address]        Equal Access to Services     [facility] HARDEEP AH: Hadii aad ku hadasho Soomaali, waaxda luqadaha, qaybta kaalmada adeegyada, ryan bowman. So [facility] [phone].    ATENCIÓN: Si habla español, tiene a dover disposición servicios gratuitos de asistencia lingüística. Llame al [phone].    We comply with applicable federal civil rights laws and [place] laws. We do not discriminate on the basis of race, color, national origin, age, disability sex, sexual orientation or gender identity.            Thank you!     Thank you for choosing [facility]  for your care. Our goal is  always to provide you with excellent care. Hearing back from our patients is one way we can continue to improve our services. Please take a few minutes to complete the written survey that you may receive in the mail after your visit with us. Thank you!             Your Updated Medication List - Protect others around you: Learn how to safely use, store and throw away your medicines at www.disposemymeds.org.          This list is accurate as of: 7/7/17 12:24 PM.  Always use your most recent med list.                   Brand Name Dispense Instructions for use Diagnosis    ACETAMINOPHEN PO      Take 650 mg by mouth 3 times daily        Acidophilus Lactobacillus Caps     14 capsule    Take 1 packet by mouth 2 times daily    Diarrhea, unspecified type       albuterol (2.5 MG/3ML) 0.083% neb solution     360 mL    Take 1 vial (2.5 mg) by nebulization every 2 hours as needed for shortness of breath / dyspnea or other (dyspnea)    COPD exacerbation (H)       ALLOPURINOL PO      Take 300 mg by mouth daily        BABY ASPIRIN 81 MG chewable tablet   Generic drug:  aspirin      1 TABLET DAILY        FLOMAX 0.4 MG capsule   Generic drug:  tamsulosin      Take 0.4 mg by mouth 2 times daily        metoprolol 25 MG tablet    LOPRESSOR    180 tablet    Take 12.5 mg by mouth 2 times daily 1/2 25mg tablet= 12.5mg        order for DME     1 Device    Equipment being ordered: Other: Home nebulizer Treatment Diagnosis: COPD/Pneumonia    COPD exacerbation (H)       OXYCODONE HCL PO      Take 5-10 mg by mouth every 4 hours as needed        PAXIL PO      Take 10 mg by mouth daily        predniSONE 10 MG tablet    DELTASONE    30 tablet    4 tabs daily for 3 days, then 3 tabs daily for 3 days, then 2 tabs daily for 3 days, then 1 tab daily for 3 days, then stop    COPD exacerbation (H)

## 2017-07-07 NOTE — PROGRESS NOTES
[facility]    Chief Complaint   Patient presents with     RECHECK       HPI:    Ron Gilmore is a 76 year old  (1940), who is being seen today for an episodic care visit at [facility].    HPI information obtained from: facility chart records, facility staff, patient report and [facility] chart review. Today's concern is:     Type 2 diabetes mellitus without complication, without long-term current use of insulin (H)  Patient diet controlled. Asks to have BG changed to once daily checks. Recent BG Range:   AM:   Noon: 104-177  PM:110-165  HS: 114-247    Dyspnea, unspecified type  History of pneumonia  Also mention of COPD. Now off oxygen, no dyspnea and sats 95% room air    Tibia/fibula fracture, left, closed, with routine healing, subsequent encounter   proximal left tibial shaft and fibular shaft nonsurgical management, continue knee immobilizer in full extension WBAT f/u with ortho Dr. Verduzco in 2-3 weeks. No pain.         REVIEW OF SYSTEMS:  4 point ROS including Respiratory, CV, GI and , other than that noted in the HPI,  is negative    /80  Pulse 87  Temp 97.5  F (36.4  C)  Resp 18  SpO2 95%  GENERAL APPEARANCE:  Alert, in no distress  Awake, on room air.   LSC, no cough.   No LE edema.   Abdomen soft and large, positive BS  Left leg in immobilizer, intact CMS    ASSESSMENT/PLAN:  Type 2 diabetes mellitus without complication, without long-term current use of insulin (H)  Chronic, diet controlled. Check BG once daily and F/U PRN    Dyspnea, unspecified type  History of pneumonia  On room air, no wheezing, no cough or fevers. DC scheduled DuoNebs continue PRN albuterol. Staff to update provider if not effective.     Tibia/fibula fracture, left, closed, with routine healing, subsequent encounter  Ongoing therapies, f/u with ortho next week.     Total time spent with patient visit at the skilled nursing facility was 25 min including patient visit and review of past  records. Greater than 50% of total time spent with counseling and coordinating care due to above health issues    Electronically signed by ELIZABETH Ayala, GNP

## 2017-07-12 ENCOUNTER — TELEPHONE (OUTPATIENT)
Dept: GERIATRICS | Facility: CLINIC | Age: 77
End: 2017-07-12

## 2017-07-12 ENCOUNTER — TRANSFERRED RECORDS (OUTPATIENT)
Dept: HEALTH INFORMATION MANAGEMENT | Facility: CLINIC | Age: 77
End: 2017-07-12

## 2017-07-12 ENCOUNTER — NURSING HOME VISIT (OUTPATIENT)
Dept: GERIATRICS | Facility: CLINIC | Age: 77
End: 2017-07-12
Payer: COMMERCIAL

## 2017-07-12 VITALS
SYSTOLIC BLOOD PRESSURE: 118 MMHG | OXYGEN SATURATION: 95 % | RESPIRATION RATE: 16 BRPM | TEMPERATURE: 97.1 F | DIASTOLIC BLOOD PRESSURE: 68 MMHG | HEART RATE: 86 BPM

## 2017-07-12 DIAGNOSIS — R30.0 DYSURIA: ICD-10-CM

## 2017-07-12 DIAGNOSIS — S82.402D TIBIA/FIBULA FRACTURE, LEFT, CLOSED, WITH ROUTINE HEALING, SUBSEQUENT ENCOUNTER: Primary | ICD-10-CM

## 2017-07-12 DIAGNOSIS — R31.9 HEMATURIA: ICD-10-CM

## 2017-07-12 DIAGNOSIS — R53.81 PHYSICAL DECONDITIONING: ICD-10-CM

## 2017-07-12 DIAGNOSIS — S82.202D TIBIA/FIBULA FRACTURE, LEFT, CLOSED, WITH ROUTINE HEALING, SUBSEQUENT ENCOUNTER: Primary | ICD-10-CM

## 2017-07-12 DIAGNOSIS — J44.9 CHRONIC OBSTRUCTIVE PULMONARY DISEASE, UNSPECIFIED COPD TYPE (H): ICD-10-CM

## 2017-07-12 PROCEDURE — 99309 SBSQ NF CARE MODERATE MDM 30: CPT | Performed by: NURSE PRACTITIONER

## 2017-07-12 NOTE — PROGRESS NOTES
"[facility]    Chief Complaint   Patient presents with     RECHECK       HPI:    Ron Gilmore is a 76 year old  (1940), who is being seen today for an episodic care visit at [facility].  HPI information obtained from: facility chart records, facility staff and patient report.Today's concern is:  Left tib/fib fx: patient denies pain or discomfort to Left leg/ankle states his leg \"never did hurt\" patient has f/u appt with ortho tomorrow, working on slid board transfers with therapy.   Dysuria/hematuria: patient states that he is having right groin pain that is intermittent but worse when he has to urinate, patient states he has had kidney stones before and is wondering if that is what this is, reports some blood in urine  But states his urine is starting to clear, denies fever, chills  COPD: denies cough, congestion, SOB,   Last 3 BP's: 118/68, 129/71, 125/69  HR Range: 68-91 bpm  Admission Weight: 303.3 lbs (6/25)    ALLERGIES: Review of patient's allergies indicates no known allergies.  Past Medical, Surgical, Family and Social History reviewed and updated in [facility].    Current Outpatient Prescriptions   Medication Sig Dispense Refill     ACETAMINOPHEN PO Take 650 mg by mouth 3 times daily       OXYCODONE HCL PO Take 5-10 mg by mouth every 4 hours as needed        Acidophilus Lactobacillus CAPS Take 1 packet by mouth 2 times daily 14 capsule 1     albuterol (2.5 MG/3ML) 0.083% neb solution Take 1 vial (2.5 mg) by nebulization every 2 hours as needed for shortness of breath / dyspnea or other (dyspnea) 360 mL 0     order for DME Equipment being ordered: Other: Home nebulizer  Treatment Diagnosis: COPD/Pneumonia 1 Device 0     ALLOPURINOL PO Take 300 mg by mouth daily       PARoxetine HCl (PAXIL PO) Take 10 mg by mouth daily       tamsulosin (FLOMAX) 0.4 MG 24 hr capsule Take 0.4 mg by mouth 2 times daily       metoprolol (LOPRESSOR) 25 MG tablet Take 12.5 mg by mouth 2 times daily 1/2 25mg " tablet= 12.5mg 180 tablet 3     BABY ASPIRIN 81 MG OR CHEW 1 TABLET DAILY       Medications reviewed:  Medications reconciled to facility chart and changes were made to reflect current medications as identified as above med list. Below are the changes that were made:   Medications stopped since last EPIC medication reconciliation:   Medications Discontinued During This Encounter   Medication Reason     predniSONE (DELTASONE) 10 MG tablet Medication Reconciliation Clean Up       Medications started since last [facility] medication reconciliation:  No orders of the defined types were placed in this encounter.    REVIEW OF SYSTEMS:  10 point ROS of systems including Constitutional, Eyes, Respiratory, Cardiovascular, Gastroenterology, Genitourinary, Integumentary, Muscularskeletal, Psychiatric were all negative except for pertinent positives noted in my HPI.    Physical Exam:  /68  Pulse 86  Temp 97.1  F (36.2  C)  Resp 16  SpO2 95%  GENERAL APPEARANCE:  Alert, in no distress, morbidly obese  ENT:  Mouth and posterior oropharynx normal, moist mucous membranes, normal hearing acuity  EYES:  EOM, conjunctivae, lids, pupils and irises normal, PERRL  RESP:  respiratory effort and palpation of chest normal, no respiratory distress, diminished breath sounds bases bilaterally, rhonchi throughout fields, upper airway congestion audible  CV:  Palpation and auscultation of heart done , regular rate and rhythm, no murmur, rub, or gallop, peripheral edema trace+ in RLE  ABDOMEN:  normal bowel sounds, soft, nontender, no hepatosplenomegaly or other masses  M/S:   Examination of:   right upper extremity, left upper extremity and right lower extremity  Inspection, ROM, stability and muscle strength normal and left knee brace in full extension  SKIN:  Inspection of skin and subcutaneous tissue baseline  NEURO:   Cranial nerves 2-12 are normal tested and grossly at patient's baseline, speech WNL    Recent Labs:    CBC RESULTS:    Recent Labs   Lab Test 06/27/17 06/23/17   2100  06/14/17   0717  06/13/17   0350   WBC   --    --   11.7*  10.0   RBC   --    --   4.18*  4.63   HGB  11.1*  14.6  12.9*  14.2   HCT   --    --   40.1  44.2   MCV   --    --   96  96   MCH   --    --   30.9  30.7   MCHC   --    --   32.2  32.1   RDW   --    --   14.8  14.6   PLT   --    --   165  159       Last Basic Metabolic Panel:  Recent Labs   Lab Test 06/27/17 06/23/17   2100   NA  135*  135   POTASSIUM  4.4  4.4   CHLORIDE  100  100   RAJ  8.5  8.4*   CO2  29  31   BUN  22  24   CR  0.92  0.92   GLC  96  152*       Liver Function Studies -   Recent Labs   Lab Test  10/25/15   1005  10/25/15   0817   PROTTOTAL  6.2*  Unsatisfactory specimen - hemolyzed  CALLED TO [facility] [address] AT 0845     ALBUMIN  2.9*  Unsatisfactory specimen - hemolyzed  CALLED TO [facility] [address] AT 0845     BILITOTAL  0.8  Unsatisfactory specimen - hemolyzed  CALLED TO [facility] [address] AT 0845     ALKPHOS  48  Unsatisfactory specimen - hemolyzed  CALLED TO [facility] [address] AT 0845     AST  18  Unsatisfactory specimen - hemolyzed  CALLED TO [facility] [address] AT 0845     ALT  31  Unsatisfactory specimen - hemolyzed  CALLED TO [facility] [address] AT 0845         Lab Results   Component Value Date    A1C 5.9 07/14/2006       Assessment/Plan:  Tibia/fibula fracture, left, closed, with routine healing, subsequent encounter  Physical conditioning  PT and OT for strengthening  Ongoing: f/u appt with ortho Teynor, continue oxycodone 5-10mg q 4 hours prn and scheduled tylenol 650mg TID    Hematuria  Dysuria  New problem: UA/UC, continue flomax 0.4mg BID, may need abdominal US and f/u with urology    Chronic obstructive pulmonary disease, unspecified COPD type (H)  Ongoing: stable at this time, SaO2 at rest and with activity, continue duonebs QID and albuterol nebs q 2 hours prn    Orders:  UA/UC for dysuria        Electronically signed by  Tonya Lynn Haase, APRN CNP

## 2017-07-13 NOTE — TELEPHONE ENCOUNTER
Called by nursing staff at [facility] about patient who is currently very symptomatic with dysuria.      UA + with WBC clumps, UC pending  PLAN: will empirically start Bactrim DS bid for 7 days, pending culture.    Loida Pearson MD

## 2017-07-28 ENCOUNTER — TRANSFERRED RECORDS (OUTPATIENT)
Dept: HEALTH INFORMATION MANAGEMENT | Facility: CLINIC | Age: 77
End: 2017-07-28

## 2017-08-01 ENCOUNTER — NURSING HOME VISIT (OUTPATIENT)
Dept: GERIATRICS | Facility: CLINIC | Age: 77
End: 2017-08-01
Payer: COMMERCIAL

## 2017-08-01 VITALS
HEART RATE: 79 BPM | DIASTOLIC BLOOD PRESSURE: 71 MMHG | SYSTOLIC BLOOD PRESSURE: 123 MMHG | RESPIRATION RATE: 18 BRPM | OXYGEN SATURATION: 91 % | TEMPERATURE: 97.7 F

## 2017-08-01 DIAGNOSIS — S82.202D TIBIA/FIBULA FRACTURE, LEFT, CLOSED, WITH ROUTINE HEALING, SUBSEQUENT ENCOUNTER: Primary | ICD-10-CM

## 2017-08-01 DIAGNOSIS — R30.0 DYSURIA: ICD-10-CM

## 2017-08-01 DIAGNOSIS — R53.81 PHYSICAL DECONDITIONING: ICD-10-CM

## 2017-08-01 DIAGNOSIS — J44.9 CHRONIC OBSTRUCTIVE PULMONARY DISEASE, UNSPECIFIED COPD TYPE (H): ICD-10-CM

## 2017-08-01 DIAGNOSIS — S82.402D TIBIA/FIBULA FRACTURE, LEFT, CLOSED, WITH ROUTINE HEALING, SUBSEQUENT ENCOUNTER: Primary | ICD-10-CM

## 2017-08-01 PROCEDURE — 99309 SBSQ NF CARE MODERATE MDM 30: CPT | Performed by: NURSE PRACTITIONER

## 2017-08-01 NOTE — PROGRESS NOTES
[facility]    Chief Complaint   Patient presents with     RECHECK       HPI:    Ron Gilmore is a 76 year old  (1940), who is being seen today for an episodic care visit at [facility].  HPI information obtained from: facility chart records, facility staff and patient report.Today's concern is:  Left tib/fib fx: no c/o pain left leg, working with therapy able to stand using a RW, WBAT LLE with straight leg brace, patient legs are very weak bilaterally, using electric scooter for mobility   Dysuria/hematuria:patient has ongoing pain with urination, denies hematuria, states pain is in his penis no shooting pain up into abdomen or back, denies fever, chills, or any other symptoms, patient has a Hx of nephrolithiasis  COPD: denies cough, congestion, SOB,   Last 3 BP's: 123/71, 158/90, 143/87  HR Range: 65-91 bpm  Admission Weight: 303.3 lbs (6/25)    ALLERGIES: Review of patient's allergies indicates no known allergies.  Past Medical, Surgical, Family and Social History reviewed and updated in [facility].    Current Outpatient Prescriptions   Medication Sig Dispense Refill     ACETAMINOPHEN PO Take 650 mg by mouth 3 times daily       OXYCODONE HCL PO Take 5-10 mg by mouth every 4 hours as needed        Acidophilus Lactobacillus CAPS Take 1 packet by mouth 2 times daily 14 capsule 1     albuterol (2.5 MG/3ML) 0.083% neb solution Take 1 vial (2.5 mg) by nebulization every 2 hours as needed for shortness of breath / dyspnea or other (dyspnea) 360 mL 0     order for DME Equipment being ordered: Other: Home nebulizer  Treatment Diagnosis: COPD/Pneumonia 1 Device 0     ALLOPURINOL PO Take 300 mg by mouth daily       PARoxetine HCl (PAXIL PO) Take 10 mg by mouth daily       tamsulosin (FLOMAX) 0.4 MG 24 hr capsule Take 0.4 mg by mouth 2 times daily       metoprolol (LOPRESSOR) 25 MG tablet Take 12.5 mg by mouth 2 times daily 1/2 25mg tablet= 12.5mg 180 tablet 3     BABY ASPIRIN 81 MG OR CHEW 1 TABLET  DAILY       Medications reviewed:  Medications reconciled to facility chart and changes were made to reflect current medications as identified as above med list. Below are the changes that were made:   Medications stopped since last EPIC medication reconciliation:   There are no discontinued medications.    Medications started since last [facility] medication reconciliation:  No orders of the defined types were placed in this encounter.      REVIEW OF SYSTEMS:  10 point ROS of systems including Constitutional, Eyes, Respiratory, Cardiovascular, Gastroenterology, Genitourinary, Integumentary, Muscularskeletal, Psychiatric were all negative except for pertinent positives noted in my HPI.    Physical Exam:  /71  Pulse 79  Temp 97.7  F (36.5  C)  Resp 18  SpO2 91%  GENERAL APPEARANCE:  Alert, in no distress, morbidly obese  ENT:  Mouth and posterior oropharynx normal, moist mucous membranes, normal hearing acuity  EYES:  EOM, conjunctivae, lids, pupils and irises normal, PERRL  RESP:  respiratory effort and palpation of chest normal, no respiratory distress, diminished breath sounds bases bilaterally, rhonchi throughout fields, upper airway congestion audible  CV:  Palpation and auscultation of heart done , regular rate and rhythm, no murmur, rub, or gallop, peripheral edema trace+ in RLE  ABDOMEN:  normal bowel sounds, soft, nontender, no hepatosplenomegaly or other masses  M/S:   Examination of:   right upper extremity, left upper extremity and right lower extremity  Inspection, ROM, stability and muscle strength normal and left knee brace in full extension  SKIN:  Inspection of skin and subcutaneous tissue baseline  NEURO:   Cranial nerves 2-12 are normal tested and grossly at patient's baseline, speech WNL    Recent Labs:    Recent UA ;negative  CBC RESULTS:   Recent Labs   Lab Test 06/27/17 06/23/17   2100  06/14/17   0717  06/13/17   0350   WBC   --    --   11.7*  10.0   RBC   --    --   4.18*  4.63   HGB   11.1*  14.6  12.9*  14.2   HCT   --    --   40.1  44.2   MCV   --    --   96  96   MCH   --    --   30.9  30.7   MCHC   --    --   32.2  32.1   RDW   --    --   14.8  14.6   PLT   --    --   165  159       Last Basic Metabolic Panel:  Recent Labs   Lab Test 06/27/17 06/23/17   2100   NA  135*  135   POTASSIUM  4.4  4.4   CHLORIDE  100  100   RAJ  8.5  8.4*   CO2  29  31   BUN  22  24   CR  0.92  0.92   GLC  96  152*       Liver Function Studies -   Recent Labs   Lab Test  10/25/15   1005  10/25/15   0817   PROTTOTAL  6.2*  Unsatisfactory specimen - hemolyzed  CALLED TO [facility] [address] AT 0845     ALBUMIN  2.9*  Unsatisfactory specimen - hemolyzed  CALLED TO [facility] [address] AT 0845     BILITOTAL  0.8  Unsatisfactory specimen - hemolyzed  CALLED TO [facility] [address] AT 0845     ALKPHOS  48  Unsatisfactory specimen - hemolyzed  CALLED TO [facility] [address] AT 0845     AST  18  Unsatisfactory specimen - hemolyzed  CALLED TO [facility] [address] AT 0845     ALT  31  Unsatisfactory specimen - hemolyzed  CALLED TO [facility] [address] AT 0845         Lab Results   Component Value Date    A1C 5.9 07/14/2006         Assessment/Plan:  Tibia/fibula fracture, left, closed, with routine healing, subsequent encounter  Physical conditioning  PT and OT for strengthening  Ongoing: f/u appt with ortho Teynor, continue oxycodone 5-10mg q 4 hours prn and scheduled tylenol 650mg TID      Dysuria  New problem: PVR q shift, straight cath for PVR > 350cc, continue flomax 0.4mg BID, make appt with urology for f/u    Chronic obstructive pulmonary disease, unspecified COPD type (H)  Ongoing: stable at this time, SaO2 at rest and with activity, continue duonebs QID and albuterol nebs q 2 hours prn    Orders:  PVR q shift  Straight cath for PVR > 350cc  Make appt with urology        Electronically signed by  Tonya Lynn Haase, APRN CNP

## 2017-08-02 NOTE — PROGRESS NOTES
Clinic Care Coordination Contact    Situation: Patient chart reviewed by care coordinator.    Background: Patient in TCU    Assessment: Patient is still admitted to TCU    Plan/Recommendations: This writer will check the chart in a few weeks to see if patient is discharged from TCU.    LIZZIE Mccabe  Care Navigator  [facility]  [phone]

## 2017-08-08 ENCOUNTER — DISCHARGE SUMMARY NURSING HOME (OUTPATIENT)
Dept: GERIATRICS | Facility: CLINIC | Age: 77
End: 2017-08-08
Payer: COMMERCIAL

## 2017-08-08 VITALS
HEART RATE: 79 BPM | OXYGEN SATURATION: 91 % | DIASTOLIC BLOOD PRESSURE: 85 MMHG | SYSTOLIC BLOOD PRESSURE: 150 MMHG | TEMPERATURE: 97 F | RESPIRATION RATE: 18 BRPM

## 2017-08-08 DIAGNOSIS — E66.01 MORBID OBESITY, UNSPECIFIED OBESITY TYPE (H): ICD-10-CM

## 2017-08-08 DIAGNOSIS — S82.202D TIBIA/FIBULA FRACTURE, LEFT, CLOSED, WITH ROUTINE HEALING, SUBSEQUENT ENCOUNTER: Primary | ICD-10-CM

## 2017-08-08 DIAGNOSIS — R53.81 PHYSICAL DECONDITIONING: ICD-10-CM

## 2017-08-08 DIAGNOSIS — R30.0 DYSURIA: ICD-10-CM

## 2017-08-08 DIAGNOSIS — J44.9 CHRONIC OBSTRUCTIVE PULMONARY DISEASE, UNSPECIFIED COPD TYPE (H): ICD-10-CM

## 2017-08-08 DIAGNOSIS — E11.9 TYPE 2 DIABETES MELLITUS WITHOUT COMPLICATION, WITHOUT LONG-TERM CURRENT USE OF INSULIN (H): ICD-10-CM

## 2017-08-08 DIAGNOSIS — S82.402D TIBIA/FIBULA FRACTURE, LEFT, CLOSED, WITH ROUTINE HEALING, SUBSEQUENT ENCOUNTER: Primary | ICD-10-CM

## 2017-08-08 PROCEDURE — 99316 NF DSCHRG MGMT 30 MIN+: CPT | Performed by: NURSE PRACTITIONER

## 2017-08-08 NOTE — PROGRESS NOTES
[facility] DISCHARGE SUMMARY    PATIENT'S NAME: Ron Gilmore  YOB: 1940  MEDICAL RECORD NUMBER:  [id number]    PRIMARY CARE PROVIDER AND CLINIC RESPONSIBLE AFTER TRANSFER: [facility] [address] / [place]    CODE STATUS/ADVANCE DIRECTIVES DISCUSSION:   CPR/Full code      No Known Allergies    TRANSFERRING PROVIDERS: Tonya Lynn Haase, APRN CNP, Nic Garland MD  DATE OF SNF ADMISSION:  June / 25 / 2017  DATE OF SNF (anticipated) DISCHARGE: August / 08 / 2017  DISCHARGE DISPOSITION: [facility] Provider   Nursing Facility: [facility] stay 06/23/2017 to 06/25/2017.     Condition on Discharge:  Stable.  Function:  Transfers indep with scoot pivot transfers to electric scooter, unable to transfer in and out of toilet so he is using urinal and bedpan, assist with dressing.  Cognitive Scores: BIMS 15/15 and Short blessed 0/28    Equipment: Electricscooter    DISCHARGE DIAGNOSIS:   1. Tibia/fibula fracture, left, closed, with routine healing, subsequent encounter    2. Physical deconditioning    3. Chronic obstructive pulmonary disease, unspecified COPD type (H)    4. Type 2 diabetes mellitus without complication, without long-term current use of insulin (H)    5. Morbid obesity, unspecified obesity type (H)    6. Dysuria        PAST MEDICAL HISTORY:  has a past medical history of Cataract; Cholelithiasis; COPD (chronic obstructive pulmonary disease) (H); Depression; Diabetes mellitus; Hyperlipidemia; Hypertension; Kidney stones; Obesity; Spinal stenosis; and Stroke (H). He also has no past medical history of Difficult intubation; Malignant hyperthermia; PONV (postoperative nausea and vomiting); or Spinal headache.    DISCHARGE MEDICATIONS:  Current Outpatient Prescriptions   Medication Sig Dispense Refill     ACETAMINOPHEN PO Take 650 mg by mouth 3 times daily       OXYCODONE HCL PO Take 5-10 mg by  mouth every 4 hours as needed        Acidophilus Lactobacillus CAPS Take 1 packet by mouth 2 times daily 14 capsule 1     albuterol (2.5 MG/3ML) 0.083% neb solution Take 1 vial (2.5 mg) by nebulization every 2 hours as needed for shortness of breath / dyspnea or other (dyspnea) 360 mL 0     order for DME Equipment being ordered: Other: Home nebulizer  Treatment Diagnosis: COPD/Pneumonia 1 Device 0     ALLOPURINOL PO Take 300 mg by mouth daily       PARoxetine HCl (PAXIL PO) Take 10 mg by mouth daily       tamsulosin (FLOMAX) 0.4 MG 24 hr capsule Take 0.4 mg by mouth 2 times daily       metoprolol (LOPRESSOR) 25 MG tablet Take 12.5 mg by mouth 2 times daily 1/2 25mg tablet= 12.5mg 180 tablet 3     BABY ASPIRIN 81 MG OR CHEW 1 TABLET DAILY         MEDICATION CHANGES/RATIONALE:   Tx for UTI started with septra 7/13 then switched to ampicillin 500mg TID for 7 days on 7/14/17  Dysuria is improved, patient is stable at Discharge, PVRs are WNL as well  Controlled medications sent with patient: none     ROS:    10 point ROS of systems including Constitutional, Eyes, Respiratory, Cardiovascular, Gastroenterology, Genitourinary, Integumentary, Muscularskeletal, Psychiatric were all negative except for pertinent positives noted in my HPI.    Physical Exam:   Vitals: /85  Pulse 79  Temp 97  F (36.1  C)  Resp 18  SpO2 91%  BMI= There is no height or weight on file to calculate BMI.    GENERAL APPEARANCE:  Alert, in no distress, morbidly obese  ENT:  Mouth and posterior oropharynx normal, moist mucous membranes, normal hearing acuity  EYES:  EOM, conjunctivae, lids, pupils and irises normal, PERRL  RESP:  respiratory effort and palpation of chest normal, no respiratory distress, diminished breath sounds bases bilaterally, rhonchi throughout fields, upper airway congestion audible  CV:  Palpation and auscultation of heart done , regular rate and rhythm, no murmur, rub, or gallop, peripheral edema trace+ in RLE and 1+  edema LLE  ABDOMEN:  normal bowel sounds, soft, nontender, no hepatosplenomegaly or other masses  M/S:   Examination of:   right upper extremity, left upper extremity and right lower extremity  Inspection, ROM, stability and muscle strength normal and left knee brace in full extension  SKIN:  Inspection of skin and subcutaneous tissue baseline, some flushing of LLE top of foot, patient denies fever, pain to left foot, patient states the redness comes and goes.   NEURO:   Cranial nerves 2-12 are normal tested and grossly at patient's baseline, speech WNL    HPI Nursing Facility Course: Patient progressed in therapy to transferring indep in and out of electric scooter using a scoot pivot method, unable to transfer in and out of toilet, using Urinal and bedpan, needing assist with ADL's, Cognitively intact, patient will DC home to live with wife. Therapy team is recommending 24 hour care at home, wife has hired in 12 hours of PCA help.   Last 3 BP's: 150/85, 131/77, 122/74  HR Range:  bpm  Admission Weight: 303.3 lbs (6/25)  HPI information obtained from: facility chart records, facility staff and patient report.    Assessment/Plan:  Tibia/fibula fracture, left, closed, with routine healing, subsequent encounter  Physical conditioning  PT and OT for strengthening  Ongoing: f/u with ortho Teynor as directed  DC home with home PT, OT, RN, HHA and , continue  tylenol 650mg prn, DC oxycodone at discharge      Dysuria  New problem: PVR WNL, continue flomax 0.4mg BID, make appt with urology for f/u if continues  Patient denies dysuria at time of discharge  Tx with ampicillin during TCU for UTI as above     Chronic obstructive pulmonary disease, unspecified COPD type (H)  Ongoing: continue duonebs QID and albuterol nebs q 2 hours prn    DISCHARGE PLAN:  Occupational Therapy, Physical Therapy, Registered Nurse, Home Health Aide and   Patient instructed to follow-up with:  PCP in 7 days       Current [place] scheduled appointments:  No future appointments    MTM referral needed and placed by this provider: No    Pending labs: none  SNF labs   CBC RESULTS:   Recent Labs   Lab Test 06/27/17 06/23/17   2100  06/14/17   0717  06/13/17   0350   WBC   --    --   11.7*  10.0   RBC   --    --   4.18*  4.63   HGB  11.1*  14.6  12.9*  14.2   HCT   --    --   40.1  44.2   MCV   --    --   96  96   MCH   --    --   30.9  30.7   MCHC   --    --   32.2  32.1   RDW   --    --   14.8  14.6   PLT   --    --   165  159       Last Basic Metabolic Panel:  7/17/17 na 139, K+ 4.5, BUN 14, creat 0.73  Recent Labs   Lab Test 06/27/17 06/23/17   2100   NA  135*  135   POTASSIUM  4.4  4.4   CHLORIDE  100  100   RAJ  8.5  8.4*   CO2  29  31   BUN  22  24   CR  0.92  0.92   GLC  96  152*       Discharge Treatments:none    TOTAL DISCHARGE TIME:   Greater than 30 minutes  Electronically signed by:  Tonya Lynn Haase, APRN CNP

## 2017-08-21 ENCOUNTER — CARE COORDINATION (OUTPATIENT)
Dept: CARE COORDINATION | Facility: CLINIC | Age: 77
End: 2017-08-21

## 2017-08-21 NOTE — PROGRESS NOTES
Clinic Care Coordination Contact  Care Coordination Communication    Referral Source: Care Team      Home Care Contact:              Home Care Agency: [facility]              Contact name () and phone number: [phone], Fax: [phone]              Care Coordination contacted home care: Yes              Anticipated start of care date: 8/20/17      Plan: RN/SW Care Coordinator will await notification from home care staff informing RN/SW Care Coordinator of patients discharge plans/needs. RN/SW Care Coordinator will review chart and outreach to home care every 4 weeks and as needed.      LIZZIE Mccabe  Care Navigator  [facility]  [phone]

## 2017-08-21 NOTE — PROGRESS NOTES
"Clinic Care Coordination Contact  OUTREACH    Referral Information:  Referral Source: Care Team TCU Discharge  DATE OF SNF ADMISSION:  June / 25 / 2017  DATE OF SNF (anticipated) DISCHARGE: August / 08 / 2017  DISCHARGE DISPOSITION: [facility] Provider   Nursing Facility: [facility] stay 06/23/2017 to 06/25/2017.[facility]  Reason for Contact: Initial  Care Conference: No     Universal Utilization:   ED Visits in last year: 0  Hospital visits in last year: 2  Last PCP appointment: 08/18/17  Missed Appointment: 0  Multiple Providers or Specialists: Oncology, MTM    Clinical Concerns:  Current Medical Concerns:   Patient Active Problem List   Diagnosis     SIRS (systemic inflammatory response syndrome) (H)     Vasovagal episode     Pneumonia     Tibia/fibula fracture     Closed tibia fracture       Current Behavioral Concerns: None   Education Provided to patient: To call clinic with all concerns.  Clinical Pathway: None    Medication Management:  Wife manages pt's medications.  Pt unable to do med rec.  No side effects reported.  No issues obtaining medications per pt report.     Functional Status:  Mobility Status: Needs assistance with transfers.  Has electric scooter.   Equipment Currently Used at Home: power chair, bath bench, grab bar, raised toilet, walker, rolling (hospital bed)  Transportation: [employer]  ADL'S Maximum assist needed with dressing and bathing  Toileting: Pt not able to transfer to the toilet so uses urinal and bedpan.  HHA's are providing assistance.     Psychosocial:  Current living arrangement:: I live in a private home with spouse  Financial/Insurance: [employer].  Denies financial concerns.    Resources and Interventions:  Current Resources: [facility]  Advanced Care Plans/Directives on file:: Yes  Patient/Caregiver understanding: Pt admits to being \"pretty good\" since returning home from the TCU.  He denies pain and is \"not needing any pain " "medication\"  He is eating well and denies  issues with sleep.   States he \"is able to bear some wt on left lower extremity now as it is improving every day since the fracture\"  He is wearing a brace that covers his ankle all the way up to his knee. He is trying to \"keep leg elevated as much as possible\"      Frequency of Care Coordination: Will f/u when pt is discharged from Home Care.  Upcoming appointment:  TBD     Plan: CC to follow up with pt when discharged from Home Care.  Pt has good support at this time.     Lucy Ortega RN  [facility]  Care Coordination  [phone]  "

## 2017-09-11 ENCOUNTER — RADIANT APPOINTMENT (OUTPATIENT)
Dept: GENERAL RADIOLOGY | Facility: CLINIC | Age: 77
End: 2017-09-11
Attending: ORTHOPAEDIC SURGERY
Payer: COMMERCIAL

## 2017-09-11 DIAGNOSIS — R52 PAIN: ICD-10-CM

## 2017-09-11 PROCEDURE — 73590 X-RAY EXAM OF LOWER LEG: CPT | Mod: TC

## 2017-10-04 ENCOUNTER — HOSPITAL ENCOUNTER (EMERGENCY)
Facility: CLINIC | Age: 77
Discharge: HOME | End: 2017-10-04
Attending: NURSE PRACTITIONER | Admitting: NURSE PRACTITIONER
Payer: COMMERCIAL

## 2017-10-04 ENCOUNTER — APPOINTMENT (OUTPATIENT)
Dept: CT IMAGING | Facility: CLINIC | Age: 77
End: 2017-10-04
Attending: NURSE PRACTITIONER
Payer: COMMERCIAL

## 2017-10-04 VITALS
OXYGEN SATURATION: 95 % | TEMPERATURE: 98.1 F | SYSTOLIC BLOOD PRESSURE: 137 MMHG | DIASTOLIC BLOOD PRESSURE: 75 MMHG | RESPIRATION RATE: 18 BRPM | HEART RATE: 77 BPM

## 2017-10-04 DIAGNOSIS — N39.0 ACUTE LOWER UTI: ICD-10-CM

## 2017-10-04 LAB
ALBUMIN SERPL-MCNC: 3.1 G/DL (ref 3.4–5)
ALBUMIN UR-MCNC: 30 MG/DL
ALP SERPL-CCNC: 83 U/L (ref 40–150)
ALT SERPL W P-5'-P-CCNC: 19 U/L (ref 0–70)
ANION GAP SERPL CALCULATED.3IONS-SCNC: 6 MMOL/L (ref 3–14)
APPEARANCE UR: ABNORMAL
AST SERPL W P-5'-P-CCNC: 11 U/L (ref 0–45)
BACTERIA #/AREA URNS HPF: ABNORMAL /HPF
BASOPHILS # BLD AUTO: 0 10E9/L (ref 0–0.2)
BASOPHILS NFR BLD AUTO: 0 %
BILIRUB SERPL-MCNC: 0.5 MG/DL (ref 0.2–1.3)
BILIRUB UR QL STRIP: NEGATIVE
BUN SERPL-MCNC: 18 MG/DL (ref 7–30)
CALCIUM SERPL-MCNC: 8.8 MG/DL (ref 8.5–10.1)
CHLORIDE SERPL-SCNC: 101 MMOL/L (ref 94–109)
CO2 SERPL-SCNC: 29 MMOL/L (ref 20–32)
COLOR UR AUTO: YELLOW
CREAT SERPL-MCNC: 0.99 MG/DL (ref 0.66–1.25)
DIFFERENTIAL METHOD BLD: ABNORMAL
EOSINOPHIL # BLD AUTO: 0.3 10E9/L (ref 0–0.7)
EOSINOPHIL NFR BLD AUTO: 3.7 %
ERYTHROCYTE [DISTWIDTH] IN BLOOD BY AUTOMATED COUNT: 14.4 % (ref 10–15)
GFR SERPL CREATININE-BSD FRML MDRD: 74 ML/MIN/1.7M2
GLUCOSE SERPL-MCNC: 103 MG/DL (ref 70–99)
GLUCOSE UR STRIP-MCNC: NEGATIVE MG/DL
HCT VFR BLD AUTO: 43.3 % (ref 40–53)
HGB BLD-MCNC: 13.8 G/DL (ref 13.3–17.7)
HGB UR QL STRIP: ABNORMAL
IMM GRANULOCYTES # BLD: 0 10E9/L (ref 0–0.4)
IMM GRANULOCYTES NFR BLD: 0.3 %
KETONES UR STRIP-MCNC: NEGATIVE MG/DL
LEUKOCYTE ESTERASE UR QL STRIP: ABNORMAL
LYMPHOCYTES # BLD AUTO: 1.2 10E9/L (ref 0.8–5.3)
LYMPHOCYTES NFR BLD AUTO: 16.6 %
MCH RBC QN AUTO: 29.6 PG (ref 26.5–33)
MCHC RBC AUTO-ENTMCNC: 31.9 G/DL (ref 31.5–36.5)
MCV RBC AUTO: 93 FL (ref 78–100)
MONOCYTES # BLD AUTO: 1.1 10E9/L (ref 0–1.3)
MONOCYTES NFR BLD AUTO: 14.5 %
NEUTROPHILS # BLD AUTO: 4.8 10E9/L (ref 1.6–8.3)
NEUTROPHILS NFR BLD AUTO: 64.9 %
NITRATE UR QL: NEGATIVE
NRBC # BLD AUTO: 0 10*3/UL
NRBC BLD AUTO-RTO: 0 /100
PH UR STRIP: 6.5 PH (ref 5–7)
PLATELET # BLD AUTO: 139 10E9/L (ref 150–450)
POTASSIUM SERPL-SCNC: 4.5 MMOL/L (ref 3.4–5.3)
PROT SERPL-MCNC: 7.2 G/DL (ref 6.8–8.8)
RBC # BLD AUTO: 4.66 10E12/L (ref 4.4–5.9)
RBC #/AREA URNS AUTO: 108 /HPF (ref 0–2)
SODIUM SERPL-SCNC: 136 MMOL/L (ref 133–144)
SOURCE: ABNORMAL
SP GR UR STRIP: 1.01 (ref 1–1.03)
UROBILINOGEN UR STRIP-MCNC: NORMAL MG/DL (ref 0–2)
WBC # BLD AUTO: 7.3 10E9/L (ref 4–11)
WBC #/AREA URNS AUTO: >182 /HPF (ref 0–2)
WBC CLUMPS #/AREA URNS HPF: PRESENT /HPF

## 2017-10-04 PROCEDURE — 87088 URINE BACTERIA CULTURE: CPT | Performed by: NURSE PRACTITIONER

## 2017-10-04 PROCEDURE — 99285 EMERGENCY DEPT VISIT HI MDM: CPT | Mod: 25

## 2017-10-04 PROCEDURE — 80053 COMPREHEN METABOLIC PANEL: CPT | Performed by: NURSE PRACTITIONER

## 2017-10-04 PROCEDURE — 96365 THER/PROPH/DIAG IV INF INIT: CPT

## 2017-10-04 PROCEDURE — 87086 URINE CULTURE/COLONY COUNT: CPT | Performed by: NURSE PRACTITIONER

## 2017-10-04 PROCEDURE — 74176 CT ABD & PELVIS W/O CONTRAST: CPT

## 2017-10-04 PROCEDURE — 85025 COMPLETE CBC W/AUTO DIFF WBC: CPT | Performed by: NURSE PRACTITIONER

## 2017-10-04 PROCEDURE — 25000128 H RX IP 250 OP 636: Performed by: NURSE PRACTITIONER

## 2017-10-04 PROCEDURE — 87186 SC STD MICRODIL/AGAR DIL: CPT | Performed by: NURSE PRACTITIONER

## 2017-10-04 PROCEDURE — 81001 URINALYSIS AUTO W/SCOPE: CPT | Performed by: NURSE PRACTITIONER

## 2017-10-04 RX ORDER — CIPROFLOXACIN 500 MG/1
500 TABLET, FILM COATED ORAL 2 TIMES DAILY
Qty: 14 TABLET | Refills: 0 | Status: SHIPPED | OUTPATIENT
Start: 2017-10-04 | End: 2017-10-04 | Stop reason: ALTCHOICE

## 2017-10-04 RX ORDER — CEPHALEXIN 500 MG/1
500 CAPSULE ORAL 4 TIMES DAILY
Qty: 56 CAPSULE | Refills: 0 | Status: SHIPPED | OUTPATIENT
Start: 2017-10-04 | End: 2017-10-18

## 2017-10-04 RX ORDER — CEFTRIAXONE 1 G/1
1 INJECTION, POWDER, FOR SOLUTION INTRAMUSCULAR; INTRAVENOUS ONCE
Status: COMPLETED | OUTPATIENT
Start: 2017-10-04 | End: 2017-10-04

## 2017-10-04 RX ORDER — CEFTRIAXONE 1 G/1
1 INJECTION, POWDER, FOR SOLUTION INTRAMUSCULAR; INTRAVENOUS ONCE
Status: DISCONTINUED | OUTPATIENT
Start: 2017-10-04 | End: 2017-10-04

## 2017-10-04 RX ADMIN — CEFTRIAXONE 1 G: 1 INJECTION, POWDER, FOR SOLUTION INTRAMUSCULAR; INTRAVENOUS at 14:24

## 2017-10-04 ASSESSMENT — ENCOUNTER SYMPTOMS
FLANK PAIN: 0
DIFFICULTY URINATING: 1
ABDOMINAL PAIN: 0
DYSURIA: 1
FREQUENCY: 1
FEVER: 0
HEMATURIA: 0
DIARRHEA: 0
CHILLS: 0
CONSTIPATION: 0

## 2017-10-04 NOTE — ED PROVIDER NOTES
History     Chief Complaint:  Dysuria    HPI   Ron Gilmore is a 77 year old male with a history of diabetes, recurrent UTIs and kidney stones who presents with dysuria, urinary frequency. The patient states he has a history of a kidney stone requiring lithotripsy under Dr. Victor 6 years ago. The patient states that approximately 2 days ago he began experiencing some dysuria with some decreased urinary output and increased frequency. His symptoms felt similar to previous kidney stones and he attempted to relieve symptoms at home but was unable to and comes here for evaluation with wife. He denies any hematuria or flank pain. He denies any known fevers, generalized weakness, testicular pain, penile pain or discharge, sores, constipation or diarrhea. He does not believe this is a UTI and says symptoms are more similar to kidney stones.    Allergies:  No known drug allergies     Medications:    Albuterol nebulizer  Allopurinol  Paxil  Flomax  Lopressor  Aspirin 81 mg      Past Medical History:    Pneumonia  Cholelithiasis  Diabetes  Depression  COPD  Cataract  Hyperlipidemia  Hypertension  Obesity  Spinal stenosis  Tib/fib fracture  Kidney stones  Recurrent UTI     Past Surgical History:    Appendectomy  Shoulder rotator cuff repair  Cholecystectomy  Cystoscopy  Right lid surgery  Joint replacement  Knee surgery  Laminectomy lumbar one level  Tonsillectomy    Family History:    History reviewed. No pertinent family history.      Social History:  Smoking status: Former smoker  Alcohol use: No   Marital Status:        Review of Systems   Constitutional: Negative for chills and fever.   Gastrointestinal: Negative for abdominal pain, constipation and diarrhea.   Genitourinary: Positive for decreased urine volume, difficulty urinating, dysuria, frequency and urgency. Negative for discharge, flank pain, hematuria, penile pain and testicular pain.   All other systems reviewed and are negative.    Physical Exam    Patient Vitals for the past 24 hrs:   BP Temp Temp src Pulse Resp SpO2   10/04/17 1522 - - - - - 95 %   10/04/17 1428 137/75 - - 77 - 94 %   10/04/17 1426 137/75 - - - - 91 %   10/04/17 1345 - - - - - 91 %   10/04/17 1344 - - - - - 91 %   10/04/17 1330 - - - - - 90 %   10/04/17 1315 - - - - - 91 %   10/04/17 1300 129/86 98.1  F (36.7  C) Oral 81 18 (!) 88 %      Physical Exam  Nursing notes reviewed. Vitals reviewed. Sitting in wheelchair.  General: Alert, laughing during exam. Well kept.  Eyes:  Conjunctiva non-injected, non-icteric.  Neck/Throat: Moist mucous membranes, oropharynx clear without erythema or exudate. No cervical lymphadenopathy.  Normal voice.  Cardiac: Regular rhythm. Normal heart sounds with no murmur/rubs/click. Normal distal pulses.  Pulmonary: Clear and equal breath sounds bilaterally. No crackles/rales. No wheezing  Abdomen: Soft. Non-distended. Non-tender to palpation. No masses. No guarding or rebound. No CVA tenderness.  Musculoskeletal: Normal gross range of motion of all 4 extremities.  Brace on left lower extremity.   Neurological: Alert and oriented x4.   Skin: Warm and dry without rashes or petechiae. Normal appearance of visualized exposed skin.  Psych: Affect normal. Good eye contact.    Emergency Department Course     Imaging:  Radiographic findings were communicated with the patient who voiced understanding of the findings.    CT-scan Abdomen/Pelvis w/o contrast:  1. No urinary tract stones or hydronephrosis. Previously seen stone in  the right kidney is no longer present.  2. Atherosclerotic changes of the aorta without evidence of aneurysm.  Preliminary result per radiology.      Laboratory:  CBC:  (L), o/w WNL (WBC 7.3, HGB 13.8)    CMP: Glucose 103 (H), Albumin 3.1 (L), o/w WNL (Creatinine 0.99)   UA: Moderate blood, Albumin 30, leukocyte esterase large, WBC >182 (H),  (H), WBC Clumps present, bacteria many, o/w negative    Interventions:  1424 - Rocephin 1g  IVPB    Emergency Department Course:  Past medical records, nursing notes, and vitals reviewed.  1225: I performed an exam of the patient and obtained history, as documented above.    IV inserted and blood drawn.     The patient was sent for a CT-scan while in the emergency department, findings above.     1518: I rechecked the patient. Findings and plan explained to the Patient. Patient discharged home with instructions regarding supportive care, medications, and reasons to return. The importance of close follow-up was reviewed.      Impression & Plan      Medical Decision Making:  Ron Gilmore is a 77 year old male who presents for evaluation of urinary frequency. Differential diagnosis includes kidney stone, UTI, bladder obstruction, abdominal causes such as diverticulitis, appendicitis. On examination the patient has no abdominal or CVA tenderness. He is normotensive with no tachycardia and is afebrile. Lab work today is unremarkable with only a mild decrease in his platelets of 139. There is no elevation in neutrophils or white cell count. Kidney function is without abnormality and his electrolytes are also normal. Urinalysis does reveal a large leukocyte esterase and many bacteria concerning for UTI. CT AP was obtained due to history of kidney stones and it shows no stones and also shows a normal appearance of the kidneys and bladder. There are no signs of sepsis or hemodynamic instability. The patient has been hospitalized in the past for sepsis and I discussed admission for monitoring secondary to known UTI and the patient is declining today stating  I feel perfectly fine and want to go home.  He was given a dose of IV Ceftriaxone in the ED. He was not sent home on Cipro as the last urine culture reviewed shows he was resistant to this but susceptible to a cephalosporin so he was sent home on Keflex, QID for 14 days. A urine culture is pending. The patient will follow up with primary care tomorrow and has  strict instructions to return with any fevers, abdominal pain, nausea, or vomiting, weakness.    Diagnosis:    ICD-10-CM    1. Acute lower UTI N39.0      Discharge Medications:   Details   cephALEXin (KEFLEX) 500 MG capsule Take 1 capsule (500 mg) by mouth 4 times daily for 14 days, Disp-56 capsule, R-0, Local Print          Tanner Rizzo  10/4/2017    EMERGENCY DEPARTMENT  I, Tanner Rizzo, am serving as a scribe at 12:25 PM on 10/4/2017 to document services personally performed by Fay Gonzales CNP based on my observations and the provider's statements to me.       Fay Gonzales CNP  10/04/17 1932

## 2017-10-04 NOTE — ED AVS SNAPSHOT
Emergency Department    [address] [facility] [address]    Phone:  [phone]    Fax:  [phone]                                       Ron Gilmoer   MRN: [id number]    Department:   Emergency Department   Date of Visit:  10/4/2017           After Visit Summary Signature Page     I have received my discharge instructions, and my questions have been answered. I have discussed any challenges I see with this plan with the nurse or doctor.    ..........................................................................................................................................  Patient/Patient Representative Signature      ..........................................................................................................................................  Patient Representative Print Name and Relationship to Patient    ..................................................               ................................................  Date                                            Time    ..........................................................................................................................................  Reviewed by Signature/Title    ...................................................              ..............................................  Date                                                            Time

## 2017-10-04 NOTE — DISCHARGE INSTRUCTIONS
Urinary Tract Infections in Men  Urinary tract infections (UTIs) are most often caused by bacteria (germs) that invade the urinary tract. The bacteria may come from outside the body. Or they may travel from the skin outside of rectum into the urethra. Pain in or around the urinary tract is a common symptom for most UTIs. But the only way to know for sure if you have a UTI is to have a urinalysis and urine culture.     Four Types of UTIs    Cystitis: A bladder infection, or cystitis, is often linked to a blockage from an enlarged prostate. You may have an urgent or frequent need to urinate, and bloody urine. Treatment includes antibiotics and medications to relax or shrink the prostate. In some cases, surgery is needed.    Urethritis: This is an infection of the urethra. You may have a discharge from the urethra or burning when you urinate.You may also have pain in the urethra or penis. Urethritis is treated with antibiotics.    Prostatitis: This is an inflammation or infection of the prostate. You may have an urgent or frequent need to urinate, fever, or burning when you urinate. Or you may have a tender prostate, or a vague feeling of pressure. Prostatitis is treated with a range of medications, depending on the cause.    Pyelonephritis: This is a kidney infection. If not treated, it can be serious and damage your kidneys. In severe cases you may be hospitalized. You may have a fever and upper back pain.  Treating a UTI    Medications: Most UTIs are treated with antibiotics. These kill the bacteria. The length of time you need to take them depends on the type of infection. Take antibiotics exactly as directed until all of the medication is gone. If you do not, the infection may not go away and may become harder to treat. For certain types of UTIs, you may be given other medications to help treat your symptoms.    Lifestyle changes: The lifestyle changes below will help get rid of your current infection. They may  also help prevent future UTIs.    Drink plenty of fluids such as water, juice, or other caffeine-free drinks. This helps flush bacteria out of your system.    Empty your bladder when you feel the urge to urinate and before going to sleep. Urine that stays in your bladder promotes infection.    Use condoms during sex. These help prevent UTIs caused by sexually transmitted bacteria.    Keep follow-up appointments with your health care provider. He or she can may do tests to make sure the infection has cleared. If necessary, additional treatment can be started.    Additional treatment: Most UTIs respond to medication. But sometimes a procedure or surgery is needed. This can treat an enlarged prostate, or remove a kidney stone or other blockage. Surgery may also treat problems caused by scarring or long-term infections.    2909-4984 [employer]. [address] [place] [address], [place] [address]. All rights reserved. This information is not intended as a substitute for professional medical care. Always follow your healthcare professional's instructions.

## 2017-10-04 NOTE — ED AVS SNAPSHOT
Emergency Department    [address] [facility] [address]    Phone:  [phone]    Fax:  [phone]                                       Ron Gilmore   MRN: [id number]    Department:   Emergency Department   Date of Visit:  10/4/2017           Patient Information     Date Of Birth          1940        Your diagnoses for this visit were:     Acute lower UTI        You were seen by Fay Gonzales, CNP.      Follow-up Information     Follow up with Augustin Thomas MD In 1 day.    Specialty:  Family Practice    Why:  As needed    Contact information:    [address] [address]  [phone]          Follow up with  Emergency Department.    Specialty:  EMERGENCY MEDICINE    Why:  As needed, If symptoms worsen    Contact information:    [address] [facility] [address] [phone]        Discharge Instructions         Urinary Tract Infections in Men  Urinary tract infections (UTIs) are most often caused by bacteria (germs) that invade the urinary tract. The bacteria may come from outside the body. Or they may travel from the skin outside of rectum into the urethra. Pain in or around the urinary tract is a common symptom for most UTIs. But the only way to know for sure if you have a UTI is to have a urinalysis and urine culture.     Four Types of UTIs    Cystitis: A bladder infection, or cystitis, is often linked to a blockage from an enlarged prostate. You may have an urgent or frequent need to urinate, and bloody urine. Treatment includes antibiotics and medications to relax or shrink the prostate. In some cases, surgery is needed.    Urethritis: This is an infection of the urethra. You may have a discharge from the urethra or burning when you urinate.You may also have pain in the urethra or penis. Urethritis is treated with antibiotics.    Prostatitis: This is an inflammation or infection of the prostate. You may have an urgent or frequent  need to urinate, fever, or burning when you urinate. Or you may have a tender prostate, or a vague feeling of pressure. Prostatitis is treated with a range of medications, depending on the cause.    Pyelonephritis: This is a kidney infection. If not treated, it can be serious and damage your kidneys. In severe cases you may be hospitalized. You may have a fever and upper back pain.  Treating a UTI    Medications: Most UTIs are treated with antibiotics. These kill the bacteria. The length of time you need to take them depends on the type of infection. Take antibiotics exactly as directed until all of the medication is gone. If you do not, the infection may not go away and may become harder to treat. For certain types of UTIs, you may be given other medications to help treat your symptoms.    Lifestyle changes: The lifestyle changes below will help get rid of your current infection. They may also help prevent future UTIs.    Drink plenty of fluids such as water, juice, or other caffeine-free drinks. This helps flush bacteria out of your system.    Empty your bladder when you feel the urge to urinate and before going to sleep. Urine that stays in your bladder promotes infection.    Use condoms during sex. These help prevent UTIs caused by sexually transmitted bacteria.    Keep follow-up appointments with your health care provider. He or she can may do tests to make sure the infection has cleared. If necessary, additional treatment can be started.    Additional treatment: Most UTIs respond to medication. But sometimes a procedure or surgery is needed. This can treat an enlarged prostate, or remove a kidney stone or other blockage. Surgery may also treat problems caused by scarring or long-term infections.    5999-1566 [employer]. [address] [place] [address], [place] [address]. All rights reserved. This information is not intended as a substitute for professional medical care. Always follow your healthcare  professional's instructions.          24 Hour Appointment Hotline       To make an appointment at any [facility], call [phone] ([phone]). If you don't have a family doctor or clinic, we will help you find one. [facility] clinics are conveniently located to serve the needs of you and your family.             Review of your medicines      START taking        Dose / Directions Last dose taken    cephALEXin 500 MG capsule   Commonly known as:  KEFLEX   Dose:  500 mg   Quantity:  56 capsule        Take 1 capsule (500 mg) by mouth 4 times daily for 14 days   Refills:  0          Our records show that you are taking the medicines listed below. If these are incorrect, please call your family doctor or clinic.        Dose / Directions Last dose taken    ACETAMINOPHEN PO   Dose:  650 mg        Take 650 mg by mouth 3 times daily   Refills:  0        Acidophilus Lactobacillus Caps   Dose:  1 packet   Quantity:  14 capsule        Take 1 packet by mouth 2 times daily   Refills:  1        albuterol (2.5 MG/3ML) 0.083% neb solution   Dose:  2.5 mg   Quantity:  360 mL        Take 1 vial (2.5 mg) by nebulization every 2 hours as needed for shortness of breath / dyspnea or other (dyspnea)   Refills:  0        ALLOPURINOL PO   Dose:  300 mg        Take 300 mg by mouth daily   Refills:  0        BABY ASPIRIN 81 MG chewable tablet   Generic drug:  aspirin        1 TABLET DAILY   Refills:  0        FLOMAX 0.4 MG capsule   Dose:  0.4 mg   Generic drug:  tamsulosin        Take 0.4 mg by mouth 2 times daily   Refills:  0        metoprolol 25 MG tablet   Commonly known as:  LOPRESSOR   Dose:  12.5 mg   Quantity:  180 tablet        Take 12.5 mg by mouth 2 times daily 1/2 25mg tablet= 12.5mg   Refills:  3        order for DME   Quantity:  1 Device        Equipment being ordered: Other: Home nebulizer Treatment Diagnosis: COPD/Pneumonia   Refills:  0        OXYCODONE HCL PO   Dose:  5-10 mg        Take 5-10 mg by mouth every 4  hours as needed   Refills:  0        PAXIL PO   Dose:  10 mg        Take 10 mg by mouth daily   Refills:  0                Prescriptions were sent or printed at these locations (1 Prescription)                   Other Prescriptions                Printed at Department/Unit printer (1 of 1)         cephALEXin (KEFLEX) 500 MG capsule                Procedures and tests performed during your visit     CBC with platelets differential    CT Abdomen Pelvis w/o Contrast    Comprehensive metabolic panel    Peripheral IV catheter    Pulse oximetry nursing    Strain urine    UA with Microscopic    Urine Culture      Orders Needing Specimen Collection     None      Pending Results     Date and Time Order Name Status Description    10/4/2017 1232 CT Abdomen Pelvis w/o Contrast Preliminary             Pending Culture Results     No orders found from 10/2/2017 to 10/5/2017.            Pending Results Instructions     If you had any lab results that were not finalized at the time of your Discharge, you can call the ED Lab Result RN at [phone]. You will be contacted by this team for any positive Lab results or changes in treatment. The nurses are available 7 days a week from 10A to 6:30P.  You can leave a message 24 hours per day and they will return your call.        Test Results From Your Hospital Stay        10/4/2017  2:07 PM      Component Results     Component Value Ref Range & Units Status    WBC 7.3 4.0 - 11.0 10e9/L Final    RBC Count 4.66 4.4 - 5.9 10e12/L Final    Hemoglobin 13.8 13.3 - 17.7 g/dL Final    Hematocrit 43.3 40.0 - 53.0 % Final    MCV 93 78 - 100 fl Final    MCH 29.6 26.5 - 33.0 pg Final    MCHC 31.9 31.5 - 36.5 g/dL Final    RDW 14.4 10.0 - 15.0 % Final    Platelet Count 139 (L) 150 - 450 10e9/L Final    Diff Method Automated Method  Final    % Neutrophils 64.9 % Final    % Lymphocytes 16.6 % Final    % Monocytes 14.5 % Final    % Eosinophils 3.7 % Final    % Basophils 0.0 % Final    % Immature  Granulocytes 0.3 % Final    Nucleated RBCs 0 0 /100 Final    Absolute Neutrophil 4.8 1.6 - 8.3 10e9/L Final    Absolute Lymphocytes 1.2 0.8 - 5.3 10e9/L Final    Absolute Monocytes 1.1 0.0 - 1.3 10e9/L Final    Absolute Eosinophils 0.3 0.0 - 0.7 10e9/L Final    Absolute Basophils 0.0 0.0 - 0.2 10e9/L Final    Abs Immature Granulocytes 0.0 0 - 0.4 10e9/L Final    Absolute Nucleated RBC 0.0  Final         10/4/2017  2:26 PM      Component Results     Component Value Ref Range & Units Status    Sodium 136 133 - 144 mmol/L Final    Potassium 4.5 3.4 - 5.3 mmol/L Final    Chloride 101 94 - 109 mmol/L Final    Carbon Dioxide 29 20 - 32 mmol/L Final    Anion Gap 6 3 - 14 mmol/L Final    Glucose 103 (H) 70 - 99 mg/dL Final    Urea Nitrogen 18 7 - 30 mg/dL Final    Creatinine 0.99 0.66 - 1.25 mg/dL Final    GFR Estimate 74 >60 mL/min/1.7m2 Final    Non  GFR Calc    GFR Estimate If Black 89 >60 mL/min/1.7m2 Final    African American GFR Calc    Calcium 8.8 8.5 - 10.1 mg/dL Final    Bilirubin Total 0.5 0.2 - 1.3 mg/dL Final    Albumin 3.1 (L) 3.4 - 5.0 g/dL Final    Protein Total 7.2 6.8 - 8.8 g/dL Final    Alkaline Phosphatase 83 40 - 150 U/L Final    ALT 19 0 - 70 U/L Final    AST 11 0 - 45 U/L Final         10/4/2017  1:28 PM      Component Results     Component Value Ref Range & Units Status    Color Urine Yellow  Final    Appearance Urine Cloudy  Final    Glucose Urine Negative NEG^Negative mg/dL Final    Bilirubin Urine Negative NEG^Negative Final    Ketones Urine Negative NEG^Negative mg/dL Final    Specific Gravity Urine 1.015 1.003 - 1.035 Final    Blood Urine Moderate (A) NEG^Negative Final    pH Urine 6.5 5.0 - 7.0 pH Final    Protein Albumin Urine 30 (A) NEG^Negative mg/dL Final    Urobilinogen mg/dL Normal 0.0 - 2.0 mg/dL Final    Nitrite Urine Negative NEG^Negative Final    Leukocyte Esterase Urine Large (A) NEG^Negative Final    Source Midstream Urine  Final    WBC Urine >182 (H) 0 - 2 /HPF Final     RBC Urine 108 (H) 0 - 2 /HPF Final    WBC Clumps Present (A) NEG^Negative /HPF Final    Bacteria Urine Many (A) NEG^Negative /HPF Final         10/4/2017  2:35 PM      Narrative     CT ABDOMEN AND PELVIS WITHOUT CONTRAST  10/4/2017 2:23 PM     HISTORY: Dysuria and frequency with similar symptoms to previous  kidney stone.    TECHNIQUE: No IV contrast as ordered. Radiation dose for this scan was  reduced using automated exposure control, adjustment of the mA and/or  kV according to patient size, or iterative reconstruction technique.    COMPARISON: CT scan from 6/13/2017.    FINDINGS: Scans through the lung bases are unremarkable.    The noncontrast appearance of the liver is normal. Previous  cholecystectomy. Spleen and pancreas are normal. No adrenal lesions.  Kidneys have a normal appearance. No kidney stones or hydronephrosis.  No ureteral stones.    There are atherosclerotic changes of the aorta without evidence of  aneurysm. No retroperitoneal adenopathy.    Bladder is unremarkable.    Bowel and mesentery are unremarkable. No thickened or inflamed bowel.  No dilated bowel. No free air or free fluid. Right total hip  arthroplasty.        Impression     IMPRESSION:  1. No urinary tract stones or hydronephrosis. Previously seen stone in  the right kidney is no longer present.  2. Atherosclerotic changes of the aorta without evidence of aneurysm.                Clinical Quality Measure: Blood Pressure Screening     Your blood pressure was checked while you were in the emergency department today. The last reading we obtained was  BP: 137/75 . Please read the guidelines below about what these numbers mean and what you should do about them.  If your systolic blood pressure (the top number) is less than 120 and your diastolic blood pressure (the bottom number) is less than 80, then your blood pressure is normal. There is nothing more that you need to do about it.  If your systolic blood pressure (the top number) is  "120-139 or your diastolic blood pressure (the bottom number) is 80-89, your blood pressure may be higher than it should be. You should have your blood pressure rechecked within a year by a primary care provider.  If your systolic blood pressure (the top number) is 140 or greater or your diastolic blood pressure (the bottom number) is 90 or greater, you may have high blood pressure. High blood pressure is treatable, but if left untreated over time it can put you at risk for heart attack, stroke, or kidney failure. You should have your blood pressure rechecked by a primary care provider within the next 4 weeks.  If your provider in the emergency department today gave you specific instructions to follow-up with your doctor or provider even sooner than that, you should follow that instruction and not wait for up to 4 weeks for your follow-up visit.        Thank you for choosing [facility]       Thank you for choosing [facility] for your care. Our goal is always to provide you with excellent care. Hearing back from our patients is one way we can continue to improve our services. Please take a few minutes to complete the written survey that you may receive in the mail after you visit with us. Thank you!        [employer] Information     [employer] lets you send messages to your doctor, view your test results, renew your prescriptions, schedule appointments and more. To sign up, go to [email or web address] . Click on \"Log in\" on the left side of the screen, which will take you to the Welcome page. Then click on \"Sign up Now\" on the right side of the page.     You will be asked to enter the access code listed below, as well as some personal information. Please follow the directions to create your username and password.     Your access code is: [email or web address]  Expires: 2018  3:28 PM     Your access code will  in 90 days. If you need help or a new code, please call your [facility] clinic or [phone].        Care EveryWhere " ID     This is your Care EveryWhere ID. This could be used by other organizations to access your [facility] medical records  [id number]        Equal Access to Services     MICHELLE MEEIR : Shelley Armenta, cory bruner, alan meyer, ryan bowman. So [facility] [phone].    ATENCIÓN: Si habla español, tiene a dover disposición servicios gratuitos de asistencia lingüística. Llame al [phone].    We comply with applicable federal civil rights laws and [place] laws. We do not discriminate on the basis of race, color, national origin, age, disability, sex, sexual orientation, or gender identity.            After Visit Summary       This is your record. Keep this with you and show to your community pharmacist(s) and doctor(s) at your next visit.

## 2017-10-05 ENCOUNTER — HOSPITAL ENCOUNTER (EMERGENCY)
Facility: CLINIC | Age: 77
Discharge: HOME | End: 2017-10-05
Attending: EMERGENCY MEDICINE | Admitting: EMERGENCY MEDICINE
Payer: COMMERCIAL

## 2017-10-05 ENCOUNTER — NURSE TRIAGE (OUTPATIENT)
Dept: NURSING | Facility: CLINIC | Age: 77
End: 2017-10-05

## 2017-10-05 VITALS
HEIGHT: 72 IN | BODY MASS INDEX: 42.66 KG/M2 | SYSTOLIC BLOOD PRESSURE: 126 MMHG | OXYGEN SATURATION: 98 % | TEMPERATURE: 99.5 F | RESPIRATION RATE: 22 BRPM | DIASTOLIC BLOOD PRESSURE: 79 MMHG | WEIGHT: 315 LBS

## 2017-10-05 DIAGNOSIS — N39.0 URINARY TRACT INFECTION IN MALE: ICD-10-CM

## 2017-10-05 DIAGNOSIS — R33.8 ACUTE URINARY RETENTION: ICD-10-CM

## 2017-10-05 LAB
ALBUMIN UR-MCNC: 30 MG/DL
ANION GAP SERPL CALCULATED.3IONS-SCNC: 8 MMOL/L (ref 3–14)
APPEARANCE UR: ABNORMAL
BASOPHILS # BLD AUTO: 0 10E9/L (ref 0–0.2)
BASOPHILS NFR BLD AUTO: 0.1 %
BILIRUB UR QL STRIP: NEGATIVE
BUN SERPL-MCNC: 17 MG/DL (ref 7–30)
CALCIUM SERPL-MCNC: 8.8 MG/DL (ref 8.5–10.1)
CHLORIDE SERPL-SCNC: 101 MMOL/L (ref 94–109)
CO2 SERPL-SCNC: 28 MMOL/L (ref 20–32)
COLOR UR AUTO: YELLOW
CREAT SERPL-MCNC: 0.92 MG/DL (ref 0.66–1.25)
DIFFERENTIAL METHOD BLD: NORMAL
EOSINOPHIL # BLD AUTO: 0.3 10E9/L (ref 0–0.7)
EOSINOPHIL NFR BLD AUTO: 3.3 %
ERYTHROCYTE [DISTWIDTH] IN BLOOD BY AUTOMATED COUNT: 14.6 % (ref 10–15)
GFR SERPL CREATININE-BSD FRML MDRD: 80 ML/MIN/1.7M2
GLUCOSE SERPL-MCNC: 114 MG/DL (ref 70–99)
GLUCOSE UR STRIP-MCNC: NEGATIVE MG/DL
HCT VFR BLD AUTO: 43.3 % (ref 40–53)
HGB BLD-MCNC: 13.9 G/DL (ref 13.3–17.7)
HGB UR QL STRIP: ABNORMAL
IMM GRANULOCYTES # BLD: 0 10E9/L (ref 0–0.4)
IMM GRANULOCYTES NFR BLD: 0.2 %
KETONES UR STRIP-MCNC: NEGATIVE MG/DL
LEUKOCYTE ESTERASE UR QL STRIP: ABNORMAL
LYMPHOCYTES # BLD AUTO: 1.1 10E9/L (ref 0.8–5.3)
LYMPHOCYTES NFR BLD AUTO: 11.6 %
MCH RBC QN AUTO: 29.8 PG (ref 26.5–33)
MCHC RBC AUTO-ENTMCNC: 32.1 G/DL (ref 31.5–36.5)
MCV RBC AUTO: 93 FL (ref 78–100)
MONOCYTES # BLD AUTO: 1 10E9/L (ref 0–1.3)
MONOCYTES NFR BLD AUTO: 10.2 %
MUCOUS THREADS #/AREA URNS LPF: PRESENT /LPF
NEUTROPHILS # BLD AUTO: 7.1 10E9/L (ref 1.6–8.3)
NEUTROPHILS NFR BLD AUTO: 74.6 %
NITRATE UR QL: NEGATIVE
NRBC # BLD AUTO: 0 10*3/UL
NRBC BLD AUTO-RTO: 0 /100
PH UR STRIP: 6.5 PH (ref 5–7)
PLATELET # BLD AUTO: 164 10E9/L (ref 150–450)
POTASSIUM SERPL-SCNC: 4.4 MMOL/L (ref 3.4–5.3)
RBC # BLD AUTO: 4.67 10E12/L (ref 4.4–5.9)
RBC #/AREA URNS AUTO: 12 /HPF (ref 0–2)
SODIUM SERPL-SCNC: 137 MMOL/L (ref 133–144)
SOURCE: ABNORMAL
SP GR UR STRIP: 1.01 (ref 1–1.03)
UROBILINOGEN UR STRIP-MCNC: NORMAL MG/DL (ref 0–2)
WBC # BLD AUTO: 9.5 10E9/L (ref 4–11)
WBC #/AREA URNS AUTO: 168 /HPF (ref 0–2)

## 2017-10-05 PROCEDURE — 96360 HYDRATION IV INFUSION INIT: CPT

## 2017-10-05 PROCEDURE — 87086 URINE CULTURE/COLONY COUNT: CPT | Performed by: EMERGENCY MEDICINE

## 2017-10-05 PROCEDURE — 80048 BASIC METABOLIC PNL TOTAL CA: CPT | Performed by: EMERGENCY MEDICINE

## 2017-10-05 PROCEDURE — 51702 INSERT TEMP BLADDER CATH: CPT

## 2017-10-05 PROCEDURE — 99284 EMERGENCY DEPT VISIT MOD MDM: CPT | Mod: 25

## 2017-10-05 PROCEDURE — 81001 URINALYSIS AUTO W/SCOPE: CPT | Performed by: EMERGENCY MEDICINE

## 2017-10-05 PROCEDURE — 25000128 H RX IP 250 OP 636: Performed by: EMERGENCY MEDICINE

## 2017-10-05 PROCEDURE — 25000125 ZZHC RX 250

## 2017-10-05 PROCEDURE — 85025 COMPLETE CBC W/AUTO DIFF WBC: CPT | Performed by: EMERGENCY MEDICINE

## 2017-10-05 PROCEDURE — 87088 URINE BACTERIA CULTURE: CPT | Performed by: EMERGENCY MEDICINE

## 2017-10-05 RX ORDER — CEFDINIR 300 MG/1
300 CAPSULE ORAL 2 TIMES DAILY
Qty: 20 CAPSULE | Refills: 0 | Status: SHIPPED | OUTPATIENT
Start: 2017-10-05 | End: 2017-10-09

## 2017-10-05 RX ADMIN — SODIUM CHLORIDE 2000 ML: 9 INJECTION, SOLUTION INTRAVENOUS at 04:26

## 2017-10-05 RX ADMIN — LIDOCAINE HYDROCHLORIDE 10 ML: 20 JELLY TOPICAL at 04:02

## 2017-10-05 ASSESSMENT — ENCOUNTER SYMPTOMS
DIFFICULTY URINATING: 1
VOMITING: 0
NAUSEA: 0
DYSURIA: 1
ABDOMINAL PAIN: 0
FLANK PAIN: 0
FEVER: 1
SHORTNESS OF BREATH: 0

## 2017-10-05 NOTE — ED AVS SNAPSHOT
Emergency Department    [address] [facility] [address]    Phone:  [phone]    Fax:  [phone]                                       Ron Gilmore   MRN: [id number]    Department:   Emergency Department   Date of Visit:  10/5/2017           Patient Information     Date Of Birth          1940        Your diagnoses for this visit were:     Acute urinary retention     Urinary tract infection in male        You were seen by Hi Workman MD.      Follow-up Information     Follow up with Charan Bass MD. Schedule an appointment as soon as possible for a visit in 1 week.    Specialty:  Urology    Why:  or follow up with your own Urologist for chirinos catheter removal and follow up of your infection    Contact information:    [address] [facility] [address]  [facility] [address]  [phone]          Follow up with Augustin Thomas MD. Schedule an appointment as soon as possible for a visit in 2 days.    Specialty:  Family Practice    Why:  For close follow up    Contact information:    [address] [address]  [phone]          Discharge Instructions         Urinary Retention (Male)  Urinary retention is the medical term for difficulty or inability to pass urine, even though your bladder is full.  Causes  The most common cause of urinary retention in men is the bladder outlet being blocked. This can be due to an enlarged prostate gland or a bladder infection. Certain medicines can also cause this problem. This condition is more likely to occur as men get older.    This condition is treated by insertion of a catheter into the bladder to drain the urine. This provides immediate relief. The catheter may need to remain in place for a few days to prevent a recurrence. The catheter has a balloon on the tip which was inflated after insertion. This prevents the catheter from falling out.  Symptoms  Common symptoms of urinary retention include:    Pain (not  experienced by everyone)    Frequent urination    Feeling that the bladder is still full after urinating    Incontinence (not being able to control the release of urine)    Swollen abdomen  Treatment  This condition is treated by inserting a tube (catheter) into the bladder to drain the urine. This provides immediate relief. The catheter may need to stay in place for a few days. The catheter has a balloon on the tip, which is inflated after insertion. This prevents the catheter from falling out.  Home care    If you were given antibiotics, take them until they are used up, or your healthcare provider tells you to stop. It is important to finish the antibiotics even though you feel better. This is to make sure your infection has cleared.    If a catheter was left in place, it is important to keep bacteria from getting into the collection bag. Do not disconnect the catheter from the collection bag.    Use a leg band to secure the drainage tube, so it does not pull on the catheter. Drain the collection bag when it becomes full using the drain spout at the bottom of the bag.    Do not pull on or try to remove your catheter. This will injure your urethra. The catheter must be removed by a healthcare provider.  Follow-up care  Follow up with your healthcare provider, or as advised.  If a catheter was left in place, it can usually be removed within 3 to 7 days. Some conditions require the catheter to stay in longer. Your healthcare provider will tell you when to return to have the catheter removed.  When to seek medical advice  Call your healthcare provider right away if any of these occur:    Fever of 100.4 F (38 C) or higher, or as directed by your healthcare provider    Bladder or lower-abdominal pain or fullness    Abdominal swelling, nausea, vomiting, or back pain    Blood or urine leakage around the catheter    Bloody urine coming from the catheter (if a new symptom)    Weakness, dizziness, or fainting    Confusion  or change in usual level of alertness    If a catheter was left in place, return if:    Catheter falls out    Catheter stops draining for 6 hours  Date Last Reviewed: 7/26/2015 2000-2017 [employer]. [address] [place] [address], [place] [address]. All rights reserved. This information is not intended as a substitute for professional medical care. Always follow your healthcare professional's instructions.      Cardona Catheter Care    A Cardona catheter is a rubber tube that is placed through the urethra (opening where urine comes out) and into the bladder. This helps drain urine from the bladder. There is a small balloon on the end of the tube that is inflated after insertion. This keeps the catheter from sliding out of the bladder.  A Cardona catheter is used to treat urinary retention (unable to pass urine). It is also used when there is incontinence (loss of bladder control).  Home care    Finish taking any prescribed antibiotic even if you are feeling better before then.    It is important to keep bacteria from getting into the collection bag. Do not disconnect the catheter from the collection bag.    Use a leg band to secure the drainage tube, so it does not pull on the catheter. Drain the collection bag when it becomes full using the drain spout at the bottom of the bag.    Do not try to pull or remove your catheter. This will injure your urethra. It must be removed by your healthcare provider or nurse.  Follow-up care  Follow up with your healthcare provider as advised for repeat urine testing and catheter removal or replacement.  When to seek medical advice  Call your healthcare provider right away if any of these occur:    Fever of 100.4 F (38 C) or higher, or as directed by your healthcare provider    Bladder pain or fullness    Abdominal swelling, nausea or vomiting, or back pain    Blood or urine leakage around the catheter    Bloody urine coming from the catheter (if a new symptom)    Catheter  falls out    Catheter stops draining for 6 hours    Weakness, dizziness, or fainting  Date Last Reviewed: 10/1/2016    9995-5169 [employer]. [address] [place] [address], [place] [address]. All rights reserved. This information is not intended as a substitute for professional medical care. Always follow your healthcare professional's instructions.    Discharge Instructions  Urinary Tract Infection  You or your child have been diagnosed with a urinary tract infection, or UTI. The urinary tract includes the kidneys (which make urine/pee), ureters (the tubes that carry urine/pee from the kidneys to the bladder), the bladder (which stores urine/pee), and urethra (the tube that carries urine/pee out of the bladder). Urinary tract infections occur when bacteria travel up the urethra into the bladder (bladder infection) and, in some cases, from there into the kidneys (kidney infection).  Generally, every Emergency Department visit should have a follow-up clinic visit with either a primary or a specialty clinic/provider. Please follow-up as instructed by your emergency provider today.  Return to the Emergency Department if:    You or your child have severe back pain.    You or your child are vomiting (throwing up) so that you cannot take your medicine.    You or your child have a new fever (had not previously had a fever) over 101 F.    You or your child have confusion or are very weak, or feel very ill.    Your child seems much more ill, will not wake up, will not respond right, or is crying for a long time and will not calm down.    You or your child are showing signs of dehydration. These signs may include decreased urination (pee), dry mouth/gums/tongue, or decreased activity.    Follow-up with your provider:     Children under 24 months need to be seen by their regular provider within one week after a diagnosis of a UTI. It may be necessary to do some more tests to look at the child s kidney or bladder.    You  should begin to feel better within 24 - 48 hours of starting your antibiotic; follow-up with your regular clinic/doctor/provider if this is not the case.    Treatment:     You will be treated with an antibiotic to kill the bacteria. We have to make an educated guess, based on what we know about common bacteria and antibiotics, as to which antibiotic will work for your infection. We will be correct most times but there will be some cases where the antibiotic chosen is not correct (see urine cultures below).    Take a pain medication such as acetaminophen (Tylenol ) or ibuprofen (Advil , Motrin , Nuprin ).    Phenazopyridine (Pyridium , Uristat ) is a prescription medication that numbs the bladder to reduce the burning pain of some UTIs.  The same medication is available in a non-prescription version (Azo-Standard , Urodol ). This medication will change the color of the urine and tears (usually blue or orange). If you wear contacts, do not wear them while taking this medication as they may be stained by the medication.    Urine Cultures:    If indicated, a urine culture may have been performed today. This test generally takes 24-48 hours to complete so the results are not known at this time. The results can confirm that an infection is present but also determine which antibiotic is effective for the specific bacteria that is causing the infection. If your urine culture shows that the antibiotic you were given today will not work to treat your infection, we will attempt to contact you to make arrangements to change the antibiotic. If the culture confirms that the antibiotic is effective for your infection, you will not be contacted. We often recommend follow-up with your regular physician/provider on the culture results regardless of this process.    Antibiotic Warning:     If you have been placed on antibiotics - watch for signs of allergic reaction.  These include rash, lip swelling, difficulty breathing, wheezing,  "and dizziness.  If you develop any of these symptoms, stop the antibiotic immediately and go to an emergency room or urgent care for evaluation.    Probiotics: If you have been given an antibiotic, you may want to also take a probiotic pill or eat yogurt with live cultures. Probiotics have \"good bacteria\" to help your intestines stay healthy. Studies have shown that probiotics help prevent diarrhea and other intestine problems (including C. diff infection) when you take antibiotics. You can buy these without a prescription in the pharmacy section of the store.   If you were given a prescription for medicine here today, be sure to read all of the information (including the package insert) that comes with your prescription.  This will include important information about the medicine, its side effects, and any warnings that you need to know about.  The pharmacist who fills the prescription can provide more information and answer questions you may have about the medicine.  If you have questions or concerns that the pharmacist cannot address, please call or return to the Emergency Department.   Remember that you can always come back to the Emergency Department if you are not able to see your regular provider in the amount of time listed above, if you get any new symptoms, or if there is anything that worries you.          24 Hour Appointment Hotline       To make an appointment at any [facility], call [phone] ([phone]). If you don't have a family doctor or clinic, we will help you find one. [facility] clinics are conveniently located to serve the needs of you and your family.             Review of your medicines      START taking        Dose / Directions Last dose taken    cefdinir 300 MG capsule   Commonly known as:  OMNICEF   Dose:  300 mg   Quantity:  20 capsule        Take 1 capsule (300 mg) by mouth 2 times daily   Refills:  0          Our records show that you are taking the medicines listed below. " If these are incorrect, please call your family doctor or clinic.        Dose / Directions Last dose taken    ACETAMINOPHEN PO   Dose:  650 mg        Take 650 mg by mouth 3 times daily   Refills:  0        Acidophilus Lactobacillus Caps   Dose:  1 packet   Quantity:  14 capsule        Take 1 packet by mouth 2 times daily   Refills:  1        albuterol (2.5 MG/3ML) 0.083% neb solution   Dose:  2.5 mg   Quantity:  360 mL        Take 1 vial (2.5 mg) by nebulization every 2 hours as needed for shortness of breath / dyspnea or other (dyspnea)   Refills:  0        ALLOPURINOL PO   Dose:  300 mg        Take 300 mg by mouth daily   Refills:  0        BABY ASPIRIN 81 MG chewable tablet   Generic drug:  aspirin        1 TABLET DAILY   Refills:  0        cephALEXin 500 MG capsule   Commonly known as:  KEFLEX   Dose:  500 mg   Quantity:  56 capsule        Take 1 capsule (500 mg) by mouth 4 times daily for 14 days   Refills:  0        FLOMAX 0.4 MG capsule   Dose:  0.4 mg   Generic drug:  tamsulosin        Take 0.4 mg by mouth 2 times daily   Refills:  0        metoprolol 25 MG tablet   Commonly known as:  LOPRESSOR   Dose:  12.5 mg   Quantity:  180 tablet        Take 12.5 mg by mouth 2 times daily 1/2 25mg tablet= 12.5mg   Refills:  3        order for DME   Quantity:  1 Device        Equipment being ordered: Other: Home nebulizer Treatment Diagnosis: COPD/Pneumonia   Refills:  0        OXYCODONE HCL PO   Dose:  5-10 mg        Take 5-10 mg by mouth every 4 hours as needed   Refills:  0        PAXIL PO   Dose:  10 mg        Take 10 mg by mouth daily   Refills:  0                Prescriptions were sent or printed at these locations (1 Prescription)                   Other Prescriptions                Printed at Department/Unit printer (1 of 1)         cefdinir (OMNICEF) 300 MG capsule                Procedures and tests performed during your visit     Basic metabolic panel    CBC with platelets differential    UA with Microscopic     Urine Culture      Orders Needing Specimen Collection     None      Pending Results     Date and Time Order Name Status Description    10/5/2017 0419 Urine Culture In process     10/4/2017 1517 Urine Culture In process             Pending Culture Results     Date and Time Order Name Status Description    10/5/2017 0419 Urine Culture In process     10/4/2017 1517 Urine Culture In process             Pending Results Instructions     If you had any lab results that were not finalized at the time of your Discharge, you can call the ED Lab Result RN at [phone]. You will be contacted by this team for any positive Lab results or changes in treatment. The nurses are available 7 days a week from 10A to 6:30P.  You can leave a message 24 hours per day and they will return your call.        Test Results From Your Hospital Stay        10/5/2017  4:42 AM      Component Results     Component Value Ref Range & Units Status    WBC 9.5 4.0 - 11.0 10e9/L Final    RBC Count 4.67 4.4 - 5.9 10e12/L Final    Hemoglobin 13.9 13.3 - 17.7 g/dL Final    Hematocrit 43.3 40.0 - 53.0 % Final    MCV 93 78 - 100 fl Final    MCH 29.8 26.5 - 33.0 pg Final    MCHC 32.1 31.5 - 36.5 g/dL Final    RDW 14.6 10.0 - 15.0 % Final    Platelet Count 164 150 - 450 10e9/L Final    Diff Method Automated Method  Final    % Neutrophils 74.6 % Final    % Lymphocytes 11.6 % Final    % Monocytes 10.2 % Final    % Eosinophils 3.3 % Final    % Basophils 0.1 % Final    % Immature Granulocytes 0.2 % Final    Nucleated RBCs 0 0 /100 Final    Absolute Neutrophil 7.1 1.6 - 8.3 10e9/L Final    Absolute Lymphocytes 1.1 0.8 - 5.3 10e9/L Final    Absolute Monocytes 1.0 0.0 - 1.3 10e9/L Final    Absolute Eosinophils 0.3 0.0 - 0.7 10e9/L Final    Absolute Basophils 0.0 0.0 - 0.2 10e9/L Final    Abs Immature Granulocytes 0.0 0 - 0.4 10e9/L Final    Absolute Nucleated RBC 0.0  Final         10/5/2017  4:57 AM      Component Results     Component Value Ref Range & Units  Status    Sodium 137 133 - 144 mmol/L Final    Potassium 4.4 3.4 - 5.3 mmol/L Final    Chloride 101 94 - 109 mmol/L Final    Carbon Dioxide 28 20 - 32 mmol/L Final    Anion Gap 8 3 - 14 mmol/L Final    Glucose 114 (H) 70 - 99 mg/dL Final    Urea Nitrogen 17 7 - 30 mg/dL Final    Creatinine 0.92 0.66 - 1.25 mg/dL Final    GFR Estimate 80 >60 mL/min/1.7m2 Final    Non  GFR Calc    GFR Estimate If Black >90 >60 mL/min/1.7m2 Final    African American GFR Calc    Calcium 8.8 8.5 - 10.1 mg/dL Final         10/5/2017  4:38 AM         10/5/2017  4:47 AM      Component Results     Component Value Ref Range & Units Status    Color Urine Yellow  Final    Appearance Urine Slightly Cloudy  Final    Glucose Urine Negative NEG^Negative mg/dL Final    Bilirubin Urine Negative NEG^Negative Final    Ketones Urine Negative NEG^Negative mg/dL Final    Specific Gravity Urine 1.010 1.003 - 1.035 Final    Blood Urine Trace (A) NEG^Negative Final    pH Urine 6.5 5.0 - 7.0 pH Final    Protein Albumin Urine 30 (A) NEG^Negative mg/dL Final    Urobilinogen mg/dL Normal 0.0 - 2.0 mg/dL Final    Nitrite Urine Negative NEG^Negative Final    Leukocyte Esterase Urine Large (A) NEG^Negative Final    Source Catheterized Urine  Final    WBC Urine 168 (H) 0 - 2 /HPF Final    RBC Urine 12 (H) 0 - 2 /HPF Final    Mucous Urine Present (A) NEG^Negative /LPF Final                Clinical Quality Measure: Blood Pressure Screening     Your blood pressure was checked while you were in the emergency department today. The last reading we obtained was  BP: 134/83 . Please read the guidelines below about what these numbers mean and what you should do about them.  If your systolic blood pressure (the top number) is less than 120 and your diastolic blood pressure (the bottom number) is less than 80, then your blood pressure is normal. There is nothing more that you need to do about it.  If your systolic blood pressure (the top number) is 120-139 or  "your diastolic blood pressure (the bottom number) is 80-89, your blood pressure may be higher than it should be. You should have your blood pressure rechecked within a year by a primary care provider.  If your systolic blood pressure (the top number) is 140 or greater or your diastolic blood pressure (the bottom number) is 90 or greater, you may have high blood pressure. High blood pressure is treatable, but if left untreated over time it can put you at risk for heart attack, stroke, or kidney failure. You should have your blood pressure rechecked by a primary care provider within the next 4 weeks.  If your provider in the emergency department today gave you specific instructions to follow-up with your doctor or provider even sooner than that, you should follow that instruction and not wait for up to 4 weeks for your follow-up visit.        Thank you for choosing [facility]       Thank you for choosing [facility] for your care. Our goal is always to provide you with excellent care. Hearing back from our patients is one way we can continue to improve our services. Please take a few minutes to complete the written survey that you may receive in the mail after you visit with us. Thank you!        [employer] Information     [employer] lets you send messages to your doctor, view your test results, renew your prescriptions, schedule appointments and more. To sign up, go to [email or web address] . Click on \"Log in\" on the left side of the screen, which will take you to the Welcome page. Then click on \"Sign up Now\" on the right side of the page.     You will be asked to enter the access code listed below, as well as some personal information. Please follow the directions to create your username and password.     Your access code is: [email or web address]  Expires: 2018  3:28 PM     Your access code will  in 90 days. If you need help or a new code, please call your [facility] clinic or [phone].        Care EveryWhere ID     This " is your Care EveryWhere ID. This could be used by other organizations to access your [facility] medical records  [id number]        Equal Access to Services     MICHELLE MEIER : Hadii shukri Armenta, cory bruner, alan meyer, ryan bowman. So [facility] [phone].    ATENCIÓN: Si habla español, tiene a dover disposición servicios gratuitos de asistencia lingüística. Llame al [phone].    We comply with applicable federal civil rights laws and [place] laws. We do not discriminate on the basis of race, color, national origin, age, disability, sex, sexual orientation, or gender identity.            After Visit Summary       This is your record. Keep this with you and show to your community pharmacist(s) and doctor(s) at your next visit.

## 2017-10-05 NOTE — ED PROVIDER NOTES
History     Chief Complaint:  Dysuria, Difficulty Urinating    HPI   Ron Gilmore is a 77 year old male who presents to the emergency department today for evaluation of dysuria and decreased urine output. The patient was seen in the emergency department on 10/4 for evaluation of urinary frequency. CT scan of abdomen and pelvis was negative for stones or hydronephrosis, while laboratory workup was significant for urinary tract infection. Urine culture has not resulted at this time. The patient was started on Keflex, and here, reports dysuria and inability to urinate since 1830 yesterday which causes an uncomfortable feeling in his genital area and bladder. He reports he has had prior episodes of urinary retention in the past. He has taken two doses of Keflex so far and has not had any improvement of symptoms. He denies chest pain, shortness of breath, abdominal pain, nausea, vomiting, back pain, or flank pain. At home, the patient had a temperature of 101.5, but did not take anything for it. He has had a catheter placed in the past and follows with urology due to frequent urinary tract infections and kidney stones. He reports he otherwise feels well.    Allergies:  No known drug allergies      Medications:    Keflex  Oxycodone  Albuterol nebulizer  Allopurinol  Paxil  Flomax  Lopressor  Aspirin 81 mg       Past Medical History:    Pneumonia  Cholelithiasis  Diabetes  Depression  COPD  Cataract  Hyperlipidemia  Hypertension  Obesity  Spinal stenosis  Tib/fib fracture  Kidney stones  Recurrent UTI      Past Surgical History:    Appendectomy  Shoulder rotator cuff repair  Cholecystectomy  Cystoscopy  Right lid surgery  Joint replacement  Knee surgery  Laminectomy lumbar one level  Tonsillectomy     Family History:    History reviewed. No pertinent family history.       Social History:  Smoking status: Former smoker  Alcohol use: No   Marital Status:        Review of Systems   Constitutional: Positive for  fever.   Respiratory: Negative for shortness of breath.    Cardiovascular: Negative for chest pain.   Gastrointestinal: Negative for abdominal pain, nausea and vomiting.   Genitourinary: Positive for difficulty urinating and dysuria. Negative for flank pain.   All other systems reviewed and are negative.    Physical Exam   Vitals:  Patient Vitals for the past 24 hrs:   BP Temp Temp src Heart Rate Resp SpO2 Height Weight   10/05/17 0415 - - - - - (!) 88 % - -   10/05/17 0406 115/87 99.5  F (37.5  C) Oral 96 22 90 % 1.829 m (6') (!) 145.2 kg (320 lb)   10/05/17 0400 115/87 - - - - - - -     Physical Exam  General: Nontoxic, well appearing, no distress.  Head:  Scalp, face, and head appear normal  Eyes:  Pupils are equal, round, and reactive to light    No nystagmus    Conjunctivae non-injected and sclerae white  ENT:    The external nose is normal    Pinnae are normal    The oropharynx is normal, mucous membranes dry    Uvula is in the midline  Neck:  Normal range of motion    There is no rigidity noted    Trachea is in the midline  CV:  Regular rate and rhythm     Normal S1/S2, no S3/S4    No murmur or rub  Resp:  Lungs are clear and equal bilaterally    There is no tachypnea    No increased work of breathing    No rales, wheezing, or rhonchi  GI:  Abdomen is soft, obese, no rigidity or guarding    No distension, or mass     + TTP over suprapubic area    No CVA tenderness  MS:  Normal muscular tone    Symmetric motor strength    No lower extremity edema  Skin:  No rash or acute skin lesions noted  Neuro: Awake and alert, oriented x3    Speech is normal and fluent    Moves all extremities spontaneously  Psych:  Normal affect.  Appropriate interactions.    Emergency Department Course     Laboratory:  Laboratory findings were communicated with the patient who voiced understanding of the findings.    CBC: AWNL. (WBC 9.5, HGB 13.9, )   BMP: Glucose 114 (H) o/w WNL (Creatinine 0.92)    UA with micro: Trace blood  (A), Protein albumin urine 30 (A), Leukocyte esterase urine large (A), WBC/ (H), RBC/HPF 12 (H), Mucous present (A) o/w negative  Urine culture: Pending    Interventions:  0402 Uro-jet jelly 10 mL Urethral  0426 ns 2 L IV    Emergency Department Course:  Nursing notes and vitals reviewed.  I performed an exam of the patient as documented above.   IV was inserted and blood was drawn for laboratory testing, results above.  The patient provided a urine sample here in the emergency department. This was sent for laboratory testing, findings above.  At 0509 the patient was rechecked and was updated on the results of laboratory studies.   I discussed the treatment plan with the patient. He expressed understanding of this plan and consented to discharge. He will be discharged home with instructions for care and follow up. In addition, the patient will return to the emergency department if their symptoms worsen, if new symptoms arise or if there is any concern.  All questions were answered.  I personally reviewed the laboratory results with the patient and answered all related questions prior to discharge.    Impression & Plan      Medical Decision Making:  Ron Gilmore is a 77 year old male who presented yesterday for evaluation, found to have a urinary tract infection, and was started on Keflex after dose of IV ceftriaxone in the emergency department. After discharge, the patient noted he was unable to urinate and had increasing abdominal discomfort. He was found to have acute urinary retention. Cardona was placed here in the emergency department. Greater than 800 mLs of cloudy purulent urine was drained with immediate improvement of the patient's symptoms. The patient does not appear to be systemically ill. He has no flank tenderness or fever that would indicate pyelonephritis, however given the fact he had urinary retention and history of incomplete emptying, this does represent a complicated urinary tract  infection and will require broader antibiotics. Therefore, the patient will be switched to cefdinir and discharged with chirinos catheter in place. Renal function normal. The patient was hydrated with 2L IVF prior to discharge. Patient reports that he does have a primary urologist that he sees and can follow up with. He will also be provided with the information of the on call urologist if needed. Leg bag was provided to the patient, chirinos care instructions were provided and close return precautions were given. Patient was agreeable to plan of care and discharged in improved condition. Return precautions were discussed with patient. The patient's questions were answered and the patient was agreeable with discharge.      Diagnosis:    ICD-10-CM    1. Acute urinary retention R33.8    2. Urinary tract infection in male N39.0      Disposition:   Home    Discharge Medications:  New Prescriptions    CEFDINIR (OMNICEF) 300 MG CAPSULE    Take 1 capsule (300 mg) by mouth 2 times daily     Scribe Disclosure:  Reuben LAUREN, am serving as a scribe at 4:09 AM on 10/5/2017 to document services personally performed by Hi Workman MD, based on my observations and the provider's statements to me.    10/5/2017    EMERGENCY DEPARTMENT       Hi Workman MD  10/05/17 0618

## 2017-10-05 NOTE — DISCHARGE INSTRUCTIONS
Urinary Retention (Male)  Urinary retention is the medical term for difficulty or inability to pass urine, even though your bladder is full.  Causes  The most common cause of urinary retention in men is the bladder outlet being blocked. This can be due to an enlarged prostate gland or a bladder infection. Certain medicines can also cause this problem. This condition is more likely to occur as men get older.    This condition is treated by insertion of a catheter into the bladder to drain the urine. This provides immediate relief. The catheter may need to remain in place for a few days to prevent a recurrence. The catheter has a balloon on the tip which was inflated after insertion. This prevents the catheter from falling out.  Symptoms  Common symptoms of urinary retention include:    Pain (not experienced by everyone)    Frequent urination    Feeling that the bladder is still full after urinating    Incontinence (not being able to control the release of urine)    Swollen abdomen  Treatment  This condition is treated by inserting a tube (catheter) into the bladder to drain the urine. This provides immediate relief. The catheter may need to stay in place for a few days. The catheter has a balloon on the tip, which is inflated after insertion. This prevents the catheter from falling out.  Home care    If you were given antibiotics, take them until they are used up, or your healthcare provider tells you to stop. It is important to finish the antibiotics even though you feel better. This is to make sure your infection has cleared.    If a catheter was left in place, it is important to keep bacteria from getting into the collection bag. Do not disconnect the catheter from the collection bag.    Use a leg band to secure the drainage tube, so it does not pull on the catheter. Drain the collection bag when it becomes full using the drain spout at the bottom of the bag.    Do not pull on or try to remove your catheter.  This will injure your urethra. The catheter must be removed by a healthcare provider.  Follow-up care  Follow up with your healthcare provider, or as advised.  If a catheter was left in place, it can usually be removed within 3 to 7 days. Some conditions require the catheter to stay in longer. Your healthcare provider will tell you when to return to have the catheter removed.  When to seek medical advice  Call your healthcare provider right away if any of these occur:    Fever of 100.4 F (38 C) or higher, or as directed by your healthcare provider    Bladder or lower-abdominal pain or fullness    Abdominal swelling, nausea, vomiting, or back pain    Blood or urine leakage around the catheter    Bloody urine coming from the catheter (if a new symptom)    Weakness, dizziness, or fainting    Confusion or change in usual level of alertness    If a catheter was left in place, return if:    Catheter falls out    Catheter stops draining for 6 hours  Date Last Reviewed: 7/26/2015 2000-2017 [employer]. [address] [place] [address]. All rights reserved. This information is not intended as a substitute for professional medical care. Always follow your healthcare professional's instructions.      Cardona Catheter Care    A Cardona catheter is a rubber tube that is placed through the urethra (opening where urine comes out) and into the bladder. This helps drain urine from the bladder. There is a small balloon on the end of the tube that is inflated after insertion. This keeps the catheter from sliding out of the bladder.  A Cardona catheter is used to treat urinary retention (unable to pass urine). It is also used when there is incontinence (loss of bladder control).  Home care    Finish taking any prescribed antibiotic even if you are feeling better before then.    It is important to keep bacteria from getting into the collection bag. Do not disconnect the catheter from the collection bag.    Use a leg  band to secure the drainage tube, so it does not pull on the catheter. Drain the collection bag when it becomes full using the drain spout at the bottom of the bag.    Do not try to pull or remove your catheter. This will injure your urethra. It must be removed by your healthcare provider or nurse.  Follow-up care  Follow up with your healthcare provider as advised for repeat urine testing and catheter removal or replacement.  When to seek medical advice  Call your healthcare provider right away if any of these occur:    Fever of 100.4 F (38 C) or higher, or as directed by your healthcare provider    Bladder pain or fullness    Abdominal swelling, nausea or vomiting, or back pain    Blood or urine leakage around the catheter    Bloody urine coming from the catheter (if a new symptom)    Catheter falls out    Catheter stops draining for 6 hours    Weakness, dizziness, or fainting  Date Last Reviewed: 10/1/2016    8509-6726 The [employer]. [address] [place] [address]. All rights reserved. This information is not intended as a substitute for professional medical care. Always follow your healthcare professional's instructions.    Discharge Instructions  Urinary Tract Infection  You or your child have been diagnosed with a urinary tract infection, or UTI. The urinary tract includes the kidneys (which make urine/pee), ureters (the tubes that carry urine/pee from the kidneys to the bladder), the bladder (which stores urine/pee), and urethra (the tube that carries urine/pee out of the bladder). Urinary tract infections occur when bacteria travel up the urethra into the bladder (bladder infection) and, in some cases, from there into the kidneys (kidney infection).  Generally, every Emergency Department visit should have a follow-up clinic visit with either a primary or a specialty clinic/provider. Please follow-up as instructed by your emergency provider today.  Return to the Emergency Department  if:    You or your child have severe back pain.    You or your child are vomiting (throwing up) so that you cannot take your medicine.    You or your child have a new fever (had not previously had a fever) over 101 F.    You or your child have confusion or are very weak, or feel very ill.    Your child seems much more ill, will not wake up, will not respond right, or is crying for a long time and will not calm down.    You or your child are showing signs of dehydration. These signs may include decreased urination (pee), dry mouth/gums/tongue, or decreased activity.    Follow-up with your provider:     Children under 24 months need to be seen by their regular provider within one week after a diagnosis of a UTI. It may be necessary to do some more tests to look at the child s kidney or bladder.    You should begin to feel better within 24 - 48 hours of starting your antibiotic; follow-up with your regular clinic/doctor/provider if this is not the case.    Treatment:     You will be treated with an antibiotic to kill the bacteria. We have to make an educated guess, based on what we know about common bacteria and antibiotics, as to which antibiotic will work for your infection. We will be correct most times but there will be some cases where the antibiotic chosen is not correct (see urine cultures below).    Take a pain medication such as acetaminophen (Tylenol ) or ibuprofen (Advil , Motrin , Nuprin ).    Phenazopyridine (Pyridium , Uristat ) is a prescription medication that numbs the bladder to reduce the burning pain of some UTIs.  The same medication is available in a non-prescription version (Azo-Standard , Urodol ). This medication will change the color of the urine and tears (usually blue or orange). If you wear contacts, do not wear them while taking this medication as they may be stained by the medication.    Urine Cultures:    If indicated, a urine culture may have been performed today. This test generally  "takes 24-48 hours to complete so the results are not known at this time. The results can confirm that an infection is present but also determine which antibiotic is effective for the specific bacteria that is causing the infection. If your urine culture shows that the antibiotic you were given today will not work to treat your infection, we will attempt to contact you to make arrangements to change the antibiotic. If the culture confirms that the antibiotic is effective for your infection, you will not be contacted. We often recommend follow-up with your regular physician/provider on the culture results regardless of this process.    Antibiotic Warning:     If you have been placed on antibiotics - watch for signs of allergic reaction.  These include rash, lip swelling, difficulty breathing, wheezing, and dizziness.  If you develop any of these symptoms, stop the antibiotic immediately and go to an emergency room or urgent care for evaluation.    Probiotics: If you have been given an antibiotic, you may want to also take a probiotic pill or eat yogurt with live cultures. Probiotics have \"good bacteria\" to help your intestines stay healthy. Studies have shown that probiotics help prevent diarrhea and other intestine problems (including C. diff infection) when you take antibiotics. You can buy these without a prescription in the pharmacy section of the store.   If you were given a prescription for medicine here today, be sure to read all of the information (including the package insert) that comes with your prescription.  This will include important information about the medicine, its side effects, and any warnings that you need to know about.  The pharmacist who fills the prescription can provide more information and answer questions you may have about the medicine.  If you have questions or concerns that the pharmacist cannot address, please call or return to the Emergency Department.   Remember that you can always " come back to the Emergency Department if you are not able to see your regular provider in the amount of time listed above, if you get any new symptoms, or if there is anything that worries you.

## 2017-10-05 NOTE — ED AVS SNAPSHOT
Emergency Department    [address] [facility] [address]    Phone:  [phone]    Fax:  [phone]                                       Ron Gilmore   MRN: [id number]    Department:   Emergency Department   Date of Visit:  10/5/2017           After Visit Summary Signature Page     I have received my discharge instructions, and my questions have been answered. I have discussed any challenges I see with this plan with the nurse or doctor.    ..........................................................................................................................................  Patient/Patient Representative Signature      ..........................................................................................................................................  Patient Representative Print Name and Relationship to Patient    ..................................................               ................................................  Date                                            Time    ..........................................................................................................................................  Reviewed by Signature/Title    ...................................................              ..............................................  Date                                                            Time

## 2017-10-05 NOTE — ED NOTES
Bed: ED04  Expected date: 10/5/17  Expected time:   Means of arrival:   Comments:  [facility]/pain with urination

## 2017-10-06 ENCOUNTER — CARE COORDINATION (OUTPATIENT)
Dept: CARE COORDINATION | Facility: CLINIC | Age: 77
End: 2017-10-06

## 2017-10-06 NOTE — LETTER
Complex Care Plan  About Me  Patient Name:  Ron Gilmore    YOB: 1940  Age:   77 year old   [place] MRN: [id number] Telephone Information:     Home Phone [phone]   Mobile [phone]       Address:    [address]   [place] [address] Email address:  No e-mail address on record      Emergency Contact(s)  Name Relationship Lgl Grd Work Phone Home Phone Mobile Phone   1. JONG GILMORE Spouse   [phone]   2. ADALBERTO CONNER Relative   [phone]           Primary language:  English     needed? No   [place] [facility]:  [phone] op. 1  Other communication barriers: No  Preferred Method of Communication:  Phone  Current living arrangement: I live in a private home with spouse  Mobility Status/ Medical Equipment: Independent w/Device  Other information to know about me:    Health Maintenance  Health Maintenance Reviewed: Due/Overdue Lipid panel, Pneumovax, Hgb A1c, Colon Screen)    My Access Plan  Medical Emergency 911   Primary Clinic Line [facility] - [phone]   24 Hour Appointment Line [phone] or  [phone]) (toll-free)   24 Hour Nurse Line [phone] (toll-free)   Preferred Urgent Care     Preferred Hospital [facility]  [phone]   Preferred Pharmacy [id number] - [place], [place] - [address] AT [facility]     Behavioral Health Crisis Line The National Suicide Prevention Lifeline at 1-560.817.5223 or 911     My Care Team Members  Patient Care Team       Relationship Specialty Notifications Start End    Augustin Thomas MD PCP - General Family Practice  3/31/15     Phone: [phone] Fax: [phone] [address] [place] [address] [place] [address]         My Care Plans  Self Management and Treatment Plan  Goals and (Comments)  Goal #1:  (I want to gain mobility and get rid of leg brace by 10/28)      30% of goal  reached    Goal #2:          of goal reached    Goal #3:          of goal reached    Goal #4:         of goal reached     Goal #5:          of goal reached  -  Goal #6:          of goal reached    Goal #7:          of goal reached    Goal #8:           of goal reached        Goal #9:          of goal reached    Goal #10:        of goal reached    Action Plans on File:    Advance Care Plans/Directives Type:   Type Advanced Care Plans/Directives: Advanced Directive - On File    My Medical and Care Information  Problem List   Patient Active Problem List   Diagnosis     SIRS (systemic inflammatory response syndrome) (H)     Vasovagal episode     Pneumonia     Tibia/fibula fracture     Closed tibia fracture      Current Medications and Allergies:  See printed Medication Report.    Care Coordination Start Date:     Frequency of Care Coordination: Every 2-3 weeks   Form Last Updated: 10/12/2017

## 2017-10-07 LAB
BACTERIA SPEC CULT: ABNORMAL
BACTERIA SPEC CULT: ABNORMAL
Lab: ABNORMAL
SPECIMEN SOURCE: ABNORMAL

## 2017-10-09 ENCOUNTER — TELEPHONE (OUTPATIENT)
Dept: EMERGENCY MEDICINE | Facility: CLINIC | Age: 77
End: 2017-10-09

## 2017-10-09 DIAGNOSIS — N39.0 URINARY TRACT INFECTION: ICD-10-CM

## 2017-10-09 LAB
BACTERIA SPEC CULT: ABNORMAL
Lab: ABNORMAL
SPECIMEN SOURCE: ABNORMAL

## 2017-10-10 NOTE — PROGRESS NOTES
"Clinic Care Coordination Contact  OUTREACH    Referral Information:  Referral Source: ED Follow-Up  Reason for Contact: Initial  Care Conference: No     Universal Utilization:   ED Visits in last year: 2  Hospital visits in last year: 2  Last PCP appointment: 10/06/17  Missed Appointments: 2     Multiple Providers or Specialists: Urology    Clinical Concerns:  Current Medical Concerns:   Patient Active Problem List   Diagnosis     SIRS (systemic inflammatory response syndrome) (H)     Vasovagal episode     Pneumonia     Tibia/fibula fracture     Closed tibia fracture       Current Behavioral Concerns: None   Education Provided to patient: To call clinic with all concerns.  To make appointment with urologist. To make appointment with PCP 7-10 days after completing antibiotic for UTI for repeat UA.  Pt states understanding.   Clinical Pathway Name: COPD  Clinical Pathway: Clinic Care Coordination COPD Assessment    Transportation concerns? No  Is there a referral for Pulmonary Rehab?     Symptom Review:  How have you been feeling now that you are home? \"I feel good\"  Are you having any increased shortness of breath? No  When you cough, are you coughing up anything? Yes  Color:  Not able to view  Consistency:  Thick  Frequency:  Throughout the day     Medications:  Were you started on any new medications?  No  Were any of your previous medications changed? No  Do you have all of your medications? Yes, Neb treatments (Albuterol and Duo)  Use is one time daily per pt report.  Have you had trouble filling your prescriptions? No  Are you medications effective in controlling your symptoms? Yes  Are you currently on Prednisone?  No  For patients with DM:     Are you monitoring your blood sugars, if so how are your blood sugars? No  Did you start on insulin in the hospital or has your Insulin dose changed? No  Medication reconciliation completed? No, due to wife managing medications  Was MTM or Diabetic Education referral " "placed ? No    Oxygen/DME:  Are you currently on oxygen? No   Is this new for you? N/A   Is it set up at home? N/A   Review with patient how to use/maintain nebulizers and inhalers: Yes    Activity:  Is able to stand for short periods of time with assist of 1 and walker.   How much activity can you do before you are SOB? Minimal  Have you had to reduce your activities because of dyspnea or other symptoms? No     Diet:  Do you weigh yourself daily? No    Has there been a recent change in your weight? No.  Pt is morbidly obese.  Pt states he weighs 300 lbs.  Are there any current diet restrictions or changes per discharge instructions? States following a low sugar diet.  Last Hbg A1C 6.0    Emotions/Lifestyle:  Do you smoke? No.  Quit    Medication Management:  Wife manages medications.  Wife sets up meds in mediplanner and pt takes independently.  States he is adherent to schedule.  Reporting some loose stools probably d/t antibiotic.         Functional Status:  Mobility Status: Independent w/Device  Equipment Currently Used at Home: cane, straight, grab bar, hospital bed, raised toilet, shower chair, walker, standard, wheelchair, manual, wheelchair, power  Transportation: Wife provides  ADL's: Assist of 1  Hearing and Vision: Hearing WNL no appliances worn.  Vision: History of Cataract surgery. Wears reading glasses for small print only.   Safety; Falls:  Denies falls since one that occurred in June 2017 where he \"had broke both legs\"  Still recovering from      Psychosocial:  Current living arrangement:: I live in a private home with spouse  [facility]  Is receiving some assistance from [facility] to assist with health costs for equipment, PCA coverage and therapy.     Resources and Interventions:  Current Resources:  [facility]    Advanced Care Plans/Directives on file:: Yes    Goals:   Goal 1 Statement:  I want to gain mobility so I can  get rid of leg brace by 10/28  Goal 1 Progression " "Percent: 30%  Barriers: Obesity, weakness  Strengths: Good support by OT/PT and wife.  Patient/Caregiver understanding:Pt states he is \"doing pretty good since returning home from ED\"  He states his chirinos is patent and is draining urine.  States his wife is managing emptying chirinos and changing over from leg bag to night bag. He has a PCA that is paid by the [facility] assisting with ADL's  daily.  His wife will call today to make an f/u appointment with urology to address retention issues. Pt denies pain and SOB.   He is not able to walk at this time but uses electric wheelchair to maneuver his home.  Pt does have loose sounding cough today. Denies open areas to skin.   Frequency of Care Coordination: Every 2-3 weeks  Upcoming appointment: TBD     Plan: CC to f/u with pt in 2-3 weeks.  Pt to make appointment with urologist ASAP and f/u appointment with PCP 7-10 days after completion of antibiotic.  Pt states understanding.     Lucy Ortega RN  [facility]  Care Coordination  [phone]  "

## 2017-10-13 ENCOUNTER — OFFICE VISIT (OUTPATIENT)
Dept: UROLOGY | Facility: CLINIC | Age: 77
End: 2017-10-13
Payer: COMMERCIAL

## 2017-10-13 VITALS — WEIGHT: 302 LBS | HEART RATE: 96 BPM | OXYGEN SATURATION: 93 % | BODY MASS INDEX: 40.9 KG/M2 | HEIGHT: 72 IN

## 2017-10-13 DIAGNOSIS — R33.9 URINARY RETENTION: Primary | ICD-10-CM

## 2017-10-13 PROCEDURE — 51700 IRRIGATION OF BLADDER: CPT | Performed by: PHYSICIAN ASSISTANT

## 2017-10-13 PROCEDURE — 99203 OFFICE O/P NEW LOW 30 MIN: CPT | Mod: 25 | Performed by: PHYSICIAN ASSISTANT

## 2017-10-13 ASSESSMENT — PAIN SCALES - GENERAL: PAINLEVEL: NO PAIN (1)

## 2017-10-13 NOTE — PROGRESS NOTES
CC: Urinary retention.    HPI: It is a pleasure to see . Ron Gilmore, a 77 year old male seen today in the urology clinic in consultation via self referral for evaluation of urinary retention. This developed acutely and was diagnosed with UTI. Cardona catheter was placed on 10/5/17 in [facility] ED. This has been draining well with pale, yellow urine. The patient is tolerating the catheter without issue. No clots of hematuria noted.    The patient has no prior history of AUR or similar symptoms. At baseline, patient does note some BPH symptoms, pt of Dr. Victor at .  Has been on Flomax for years. Currently denies fevers, chills, N/V, abdominal/back pain.    Past Medical History:   Diagnosis Date     Cataract      Cholelithiasis      COPD (chronic obstructive pulmonary disease) (H)      Depression      Diabetes mellitus      Hyperlipidemia      Hypertension      Kidney stones      Obesity      Spinal stenosis      Stroke (H)      Past Surgical History:   Procedure Laterality Date     APPENDECTOMY OPEN       ARTHROSCOPY SHOULDER ROTATOR CUFF REPAIR       cataracts       CHOLECYSTECTOMY       CYSTOSCOPY  10/19/2011    Procedure:CYSTOSCOPY; CYSTOSCOPY, BLADDER STONE REMOVAL; Surgeon:IRVIN SAWYER; Location: OR     EYE SURGERY      right lid surgery      JOINT REPLACEMENT       KNEE SURGERY       LAMINECTOMY LUMBAR ONE LEVEL       TONSILLECTOMY       Current Outpatient Prescriptions   Medication Sig Dispense Refill     amoxicillin-clavulanate (AUGMENTIN) 875-125 MG per tablet Take 1 tablet by mouth 2 times daily for 14 days 28 tablet 0     ACETAMINOPHEN PO Take 650 mg by mouth 3 times daily       Acidophilus Lactobacillus CAPS Take 1 packet by mouth 2 times daily 14 capsule 1     albuterol (2.5 MG/3ML) 0.083% neb solution Take 1 vial (2.5 mg) by nebulization every 2 hours as needed for shortness of breath / dyspnea or other (dyspnea) 360 mL 0     order for DME Equipment being ordered: Other: Home nebulizer  Treatment  Diagnosis: COPD/Pneumonia 1 Device 0     ALLOPURINOL PO Take 300 mg by mouth daily       PARoxetine HCl (PAXIL PO) Take 10 mg by mouth daily       tamsulosin (FLOMAX) 0.4 MG 24 hr capsule Take 0.4 mg by mouth 2 times daily       metoprolol (LOPRESSOR) 25 MG tablet Take 12.5 mg by mouth 2 times daily 1/2 25mg tablet= 12.5mg 180 tablet 3     BABY ASPIRIN 81 MG OR CHEW 1 TABLET DAILY       cephALEXin (KEFLEX) 500 MG capsule Take 1 capsule (500 mg) by mouth 4 times daily for 14 days 56 capsule 0     OXYCODONE HCL PO Take 5-10 mg by mouth every 4 hours as needed        No Known Allergies  Family History: There is no h/o  malignancy.  There is no h/o urolithiasis.     Social History: The patient does not smoke cigarettes.  Denies EtOH and illicit drug use.      ROS: A comprehensive 14 point ROS was obtained and was  otherwise negative except for that outlined above in the HPI.    PHYSICAL EXAM:   Vitals:    10/13/17 1338   Pulse: 96   SpO2: 93%   Weight: (!) 137 kg (302 lb)   Height: 1.829 m (6')     GENERAL: Well groomed/well developed/well nourished male in NAD.  HEENT: EOMI, AT, NC.  SKIN: Warm to touch, dry.  No visible rashes or lesions.  RESP: No increased respiratory effort.  LYMPH: No LE edema.  ABD: Soft, NT, ND.  No CVAT.  MS: Full ROM in extremities.  : Cardona catheter indwelling draining yellow urine.  NEURO: Alert and oriented x 3.  PSYCH: Normal mood and affect, pleasant and agreeable during interview and exam.    PROCEDURE: A trial of void was performed by nursing staff today in the clinic.  The patient's Cardona leg bag was disconnected and 300 cc of sterile water were infused into the patient's bladder via gravity drainage, which the patient tolerated fine.  The Cardona balloon was decompressed and the catheter was then removed.  After a few minutes Mr. Gilmore could not void.  The catheter did need to be replaced.      REVIEW OF OUTSIDE RECORDS: 15 minutes spent reviewing previous/outside  records.    IMAGING:   CT ABDOMEN AND PELVIS WITHOUT CONTRAST  10/4/2017 2:23 PM      HISTORY: Dysuria and frequency with similar symptoms to previous  kidney stone.     TECHNIQUE: No IV contrast as ordered. Radiation dose for this scan was  reduced using automated exposure control, adjustment of the mA and/or  kV according to patient size, or iterative reconstruction technique.     COMPARISON: CT scan from 6/13/2017.     FINDINGS: Scans through the lung bases are unremarkable.     The noncontrast appearance of the liver is normal. Previous  cholecystectomy. Spleen and pancreas are normal. No adrenal lesions.  Kidneys have a normal appearance. No kidney stones or hydronephrosis.  No ureteral stones.     There are atherosclerotic changes of the aorta without evidence of  aneurysm. No retroperitoneal adenopathy.     Bladder is unremarkable.     Bowel and mesentery are unremarkable. No thickened or inflamed bowel.  No dilated bowel. No free air or free fluid. Right total hip  arthroplasty.         IMPRESSION:  1. No urinary tract stones or hydronephrosis. Previously seen stone in  the right kidney is no longer present.  2. Atherosclerotic changes of the aorta without evidence of aneurysm.       ASSESSMENT/PLAN:  77 year old male who developed acute urinary retention following UTI requiring Cardona catheter placement. Has been taking tamsulosin daily.     Failed TOV, may need TURP pending eval by Dr. Victor.    F/U with Dr. Victor 1-2 weeks.     Carlyn Quintero PA-C  [facility] Urology    30 min spent with the patient, >50% of this time was spent in a face-to-face manner and on coordination of care.

## 2017-10-13 NOTE — MR AVS SNAPSHOT
"              After Visit Summary   10/13/2017    Ron Gilmore    MRN: [id number]           Patient Information     Date Of Birth          1940        Visit Information        Provider Department      10/13/2017 1:30 PM Carlyn Quintero PA-C [facility] [place]         Follow-ups after your visit        Who to contact     If you have questions or need follow up information about today's clinic visit or your schedule please contact [facility] [place] directly at [phone].  Normal or non-critical lab and imaging results will be communicated to you by [employer], letter or phone within 4 business days after the clinic has received the results. If you do not hear from us within 7 days, please contact the clinic through [employer] or phone. If you have a critical or abnormal lab result, we will notify you by phone as soon as possible.  Submit refill requests through [employer] or call your pharmacy and they will forward the refill request to us. Please allow 3 business days for your refill to be completed.          Additional Information About Your Visit        MyChart Information     [employer] lets you send messages to your doctor, view your test results, renew your prescriptions, schedule appointments and more. To sign up, go to [email or web address] . Click on \"Log in\" on the left side of the screen, which will take you to the Welcome page. Then click on \"Sign up Now\" on the right side of the page.     You will be asked to enter the access code listed below, as well as some personal information. Please follow the directions to create your username and password.     Your access code is: [email or web address]  Expires: 2018  3:28 PM     Your access code will  in 90 days. If you need help or a new code, please call your [facility] or [phone].        Care EveryWhere ID     This is your Care EveryWhere ID. This could be used by other " organizations to access your [facility] medical records  [id number]        Your Vitals Were     Pulse Height Pulse Oximetry BMI (Body Mass Index)          96 1.829 m (6') 93% 40.96 kg/m2         Blood Pressure from Last 3 Encounters:   10/05/17 126/79   10/04/17 137/75   08/08/17 150/85    Weight from Last 3 Encounters:   10/13/17 (!) 137 kg (302 lb)   10/05/17 (!) 145.2 kg (320 lb)   06/26/17 (!) 137.6 kg (303 lb 4.8 oz)              Today, you had the following     No orders found for display       Primary Care Provider Office Phone # Fax #    Augustin Thomas [phone] [address] [address]        Equal Access to Services     TEDDY MEIER : Hadii shukri patino hadasho Sonicole, waaxda luqadaha, qaybta kaalmada adeegyada, ryan courtney . So [facility] [phone].    ATENCIÓN: Si habla español, tiene a dover disposición servicios gratuitos de asistencia lingüística. Jonathon al [phone].    We comply with applicable federal civil rights laws and [place] laws. We do not discriminate on the basis of race, color, national origin, age, disability, sex, sexual orientation, or gender identity.            Thank you!     Thank you for choosing [facility] [place]  for your care. Our goal is always to provide you with excellent care. Hearing back from our patients is one way we can continue to improve our services. Please take a few minutes to complete the written survey that you may receive in the mail after your visit with us. Thank you!             Your Updated Medication List - Protect others around you: Learn how to safely use, store and throw away your medicines at www.disposemymeds.org.          This list is accurate as of: 10/13/17  2:31 PM.  Always use your most recent med list.                   Brand Name Dispense Instructions for use Diagnosis    ACETAMINOPHEN PO      Take 650 mg by mouth 3 times daily         Acidophilus Lactobacillus Caps     14 capsule    Take 1 packet by mouth 2 times daily    Diarrhea, unspecified type       albuterol (2.5 MG/3ML) 0.083% neb solution     360 mL    Take 1 vial (2.5 mg) by nebulization every 2 hours as needed for shortness of breath / dyspnea or other (dyspnea)    COPD exacerbation (H)       ALLOPURINOL PO      Take 300 mg by mouth daily        amoxicillin-clavulanate 875-125 MG per tablet    AUGMENTIN    28 tablet    Take 1 tablet by mouth 2 times daily for 14 days    Urinary tract infection       BABY ASPIRIN 81 MG chewable tablet   Generic drug:  aspirin      1 TABLET DAILY        cephALEXin 500 MG capsule    KEFLEX    56 capsule    Take 1 capsule (500 mg) by mouth 4 times daily for 14 days        FLOMAX 0.4 MG capsule   Generic drug:  tamsulosin      Take 0.4 mg by mouth 2 times daily        metoprolol 25 MG tablet    LOPRESSOR    180 tablet    Take 12.5 mg by mouth 2 times daily 1/2 25mg tablet= 12.5mg        order for DME     1 Device    Equipment being ordered: Other: Home nebulizer Treatment Diagnosis: COPD/Pneumonia    COPD exacerbation (H)       OXYCODONE HCL PO      Take 5-10 mg by mouth every 4 hours as needed        PAXIL PO      Take 10 mg by mouth daily

## 2017-10-13 NOTE — LETTER
10/13/2017       RE: Ron Gilmore  [address]   [place] [address]     Dear Colleague,    Thank you for referring your patient, Ron Gilmore, to the [facility] [place] at [facility]. Please see a copy of my visit note below.    CC: Urinary retention.    HPI: It is a pleasure to see . Ron Gilmore, a 77 year old male seen today in the urology clinic in consultation via self referral for evaluation of urinary retention. This developed acutely and was diagnosed with UTI. Cardona catheter was placed on 10/5/17 in [facility] ED. This has been draining well with pale, yellow urine. The patient is tolerating the catheter without issue. No clots of hematuria noted.    The patient has no prior history of AUR or similar symptoms. At baseline, patient does note some BPH symptoms, pt of Dr. Victor at .  Has been on Flomax for years. Currently denies fevers, chills, N/V, abdominal/back pain.    Past Medical History:   Diagnosis Date     Cataract      Cholelithiasis      COPD (chronic obstructive pulmonary disease) (H)      Depression      Diabetes mellitus      Hyperlipidemia      Hypertension      Kidney stones      Obesity      Spinal stenosis      Stroke (H)      Past Surgical History:   Procedure Laterality Date     APPENDECTOMY OPEN       ARTHROSCOPY SHOULDER ROTATOR CUFF REPAIR       cataracts       CHOLECYSTECTOMY       CYSTOSCOPY  10/19/2011    Procedure:CYSTOSCOPY; CYSTOSCOPY, BLADDER STONE REMOVAL; Surgeon:IRVIN SAWYER; Location:[facility] OR     EYE SURGERY      right lid surgery      JOINT REPLACEMENT       KNEE SURGERY       LAMINECTOMY LUMBAR ONE LEVEL       TONSILLECTOMY       Current Outpatient Prescriptions   Medication Sig Dispense Refill     amoxicillin-clavulanate (AUGMENTIN) 875-125 MG per tablet Take 1 tablet by mouth 2 times daily for 14 days 28 tablet 0     ACETAMINOPHEN PO Take 650 mg by mouth 3 times daily        Acidophilus Lactobacillus CAPS Take 1 packet by mouth 2 times daily 14 capsule 1     albuterol (2.5 MG/3ML) 0.083% neb solution Take 1 vial (2.5 mg) by nebulization every 2 hours as needed for shortness of breath / dyspnea or other (dyspnea) 360 mL 0     order for DME Equipment being ordered: Other: Home nebulizer  Treatment Diagnosis: COPD/Pneumonia 1 Device 0     ALLOPURINOL PO Take 300 mg by mouth daily       PARoxetine HCl (PAXIL PO) Take 10 mg by mouth daily       tamsulosin (FLOMAX) 0.4 MG 24 hr capsule Take 0.4 mg by mouth 2 times daily       metoprolol (LOPRESSOR) 25 MG tablet Take 12.5 mg by mouth 2 times daily 1/2 25mg tablet= 12.5mg 180 tablet 3     BABY ASPIRIN 81 MG OR CHEW 1 TABLET DAILY       cephALEXin (KEFLEX) 500 MG capsule Take 1 capsule (500 mg) by mouth 4 times daily for 14 days 56 capsule 0     OXYCODONE HCL PO Take 5-10 mg by mouth every 4 hours as needed        No Known Allergies  Family History: There is no h/o  malignancy.  There is no h/o urolithiasis.     Social History: The patient does not smoke cigarettes.  Denies EtOH and illicit drug use.      ROS: A comprehensive 14 point ROS was obtained and was  otherwise negative except for that outlined above in the HPI.    PHYSICAL EXAM:   Vitals:    10/13/17 1338   Pulse: 96   SpO2: 93%   Weight: (!) 137 kg (302 lb)   Height: 1.829 m (6')     GENERAL: Well groomed/well developed/well nourished male in NAD.  HEENT: EOMI, AT, NC.  SKIN: Warm to touch, dry.  No visible rashes or lesions.  RESP: No increased respiratory effort.  LYMPH: No LE edema.  ABD: Soft, NT, ND.  No CVAT.  MS: Full ROM in extremities.  : Cardona catheter indwelling draining yellow urine.  NEURO: Alert and oriented x 3.  PSYCH: Normal mood and affect, pleasant and agreeable during interview and exam.    PROCEDURE: A trial of void was performed by nursing staff today in the clinic.  The patient's Cardona leg bag was disconnected and 300 cc of sterile water were infused into  the patient's bladder via gravity drainage, which the patient tolerated fine.  The Cardona balloon was decompressed and the catheter was then removed.  After a few minutes Mr. Gilmore could not void.  The catheter did need to be replaced.      REVIEW OF OUTSIDE RECORDS: 15 minutes spent reviewing previous/outside records.    IMAGING:   CT ABDOMEN AND PELVIS WITHOUT CONTRAST  10/4/2017 2:23 PM      HISTORY: Dysuria and frequency with similar symptoms to previous  kidney stone.     TECHNIQUE: No IV contrast as ordered. Radiation dose for this scan was  reduced using automated exposure control, adjustment of the mA and/or  kV according to patient size, or iterative reconstruction technique.     COMPARISON: CT scan from 6/13/2017.     FINDINGS: Scans through the lung bases are unremarkable.     The noncontrast appearance of the liver is normal. Previous  cholecystectomy. Spleen and pancreas are normal. No adrenal lesions.  Kidneys have a normal appearance. No kidney stones or hydronephrosis.  No ureteral stones.     There are atherosclerotic changes of the aorta without evidence of  aneurysm. No retroperitoneal adenopathy.     Bladder is unremarkable.     Bowel and mesentery are unremarkable. No thickened or inflamed bowel.  No dilated bowel. No free air or free fluid. Right total hip  arthroplasty.         IMPRESSION:  1. No urinary tract stones or hydronephrosis. Previously seen stone in  the right kidney is no longer present.  2. Atherosclerotic changes of the aorta without evidence of aneurysm.       ASSESSMENT/PLAN:  77 year old male who developed acute urinary retention following UTI requiring Cardona catheter placement. Has been taking tamsulosin daily.     Failed TOV, may need TURP pending eval by Dr. Victor.    F/U with Dr. Victor 1-2 weeks.     Carlyn Quintero PA-C  [facility]rology    30 min spent with the patient, >50% of this time was spent in a face-to-face manner and on coordination of care.              Again, thank you for allowing me to participate in the care of your patient.      Sincerely,    Carlyn Quintero PA-C, [facility]

## 2017-10-13 NOTE — NURSING NOTE
Pt has had a cath in for about a week.  Urine in bag is clear per pt.  Pt broke you leg in June.  Pt got the cath last fri at ED.  Pt just stopped being able to go.  Pt had CT done.  Pt on abx for about 4 days.  Pt has diarrhea from abx.  LUCIANO Shah, CMA

## 2017-10-18 ENCOUNTER — CARE COORDINATION (OUTPATIENT)
Dept: CARE COORDINATION | Facility: CLINIC | Age: 77
End: 2017-10-18

## 2017-10-18 NOTE — PROGRESS NOTES
Clinic Care Coordination Contact  Care Team Conversations  Received update from Urology.  Please see below.    77 year old male who developed acute urinary retention following UTI requiring Cardona catheter placement. Has been taking tamsulosin daily.      Failed TOV, may need TURP pending eval by Dr. Victor.    F/U with Dr. Victor 1-2 weeks.      Carlyn Quintero PA-C  [facility] Urology

## 2017-11-13 ENCOUNTER — MEDICAL CORRESPONDENCE (OUTPATIENT)
Dept: HEALTH INFORMATION MANAGEMENT | Facility: CLINIC | Age: 77
End: 2017-11-13

## 2017-11-13 ENCOUNTER — RADIANT APPOINTMENT (OUTPATIENT)
Dept: GENERAL RADIOLOGY | Facility: CLINIC | Age: 77
End: 2017-11-13
Attending: ORTHOPAEDIC SURGERY
Payer: COMMERCIAL

## 2017-11-13 DIAGNOSIS — R52 PAIN: ICD-10-CM

## 2017-11-13 PROCEDURE — 73590 X-RAY EXAM OF LOWER LEG: CPT | Mod: TC

## 2017-12-13 ENCOUNTER — CARE COORDINATION (OUTPATIENT)
Dept: CARE COORDINATION | Facility: CLINIC | Age: 77
End: 2017-12-13

## 2017-12-15 ENCOUNTER — NURSE TRIAGE (OUTPATIENT)
Dept: NURSING | Facility: CLINIC | Age: 77
End: 2017-12-15

## 2017-12-15 NOTE — TELEPHONE ENCOUNTER
His wife stated the bag came off the chirinos catheter so he is leaking urine. Wondering if urgent care has those types of bags to fix the situation. Advised it's an ER visit. She stated he's incopacitated so it's hard to get him anywhere. I asked her if she's tried to contact his urology group. She said she did but hasn't heard back from them. I again advised it's an ER visit. She's going to wait for urology to call her back.  Destini Menjivar RN-[facility] Nurse Advisors

## 2017-12-19 NOTE — PROGRESS NOTES
Clinic Care Coordination Contact  OUTREACH    Referral Information:  Referral Source: ED Follow-Up  Reason for Contact: Acute urinary retention/UTI.  Spoke with pt via telephone today.  Care Conference: No     Universal Utilization:   ED Visits in last year: 2  Hospital visits in last year: 2  Last PCP appointment: 11/29/17  Missed Appointments: 2     Multiple Providers or Specialists: Urology,    Clinical Concerns:  Current Medical Concerns: Acute urinary retention/UTI.  Left upper and lower extremity weakness   Current Behavioral Concerns: None    Education Provided to patient: To call clinic with all medical concerns.  Pt states understanding.   Clinical Pathway Name: COPD  Clinical Pathway: Clinic Care Coordination COPD Follow up Assessment  Symptom Review:    Are you having any increased shortness of breath? No  Medications:   Were you started on any new medications since the last time we talked?  No  Do you have all of your medications? Yes  Have you had trouble filling your prescriptions? No  Are you medications effective in controlling your symptoms? Yes    For patients with DM:     Are you monitoring your blood sugars, if so how are your blood sugars? 11/29/17 Hgb A1C 6.3.  Checks BG's 1x/daily.  Today BG is 106.    Diet:  Do you weigh yourself daily? No    Has there been a recent change in your weight? No     Emotions/Lifestyle:    Do you smoke (if not asked upon initial assessment)? No.  Quit 10 years ago.      Medication Management:  Wife sets up pts medications in mediplanner and pt takes independently.  No side effects reported.  No issues with obtaining medications.     Functional Status:  Mobility Status: Dependent/Assisted by Another (Pt is unable to walk but can pivot transfer with walker.)  Pt is wheelchair bound.  Equipment Currently Used at Home: grab bar, shower chair, walker, standard, wheelchair, manual, other (see comments) (lift chair)  Transportation: Dependant.  No concerns.    "  Psychosocial:  Current living arrangement:: I live in a private home with spouse  Financial/Insurance: alex.  Is receiving some assistance from [facility] to assist with health costs for equipment, PCA coverage and therapy.     Resources and Interventions:  Current Resources:  Has assistance daily from paid PCA   Advanced Care Plans/Directives on file:: Yes    Goals:  Goal 1 Statement:  I want to gain mobility so I can  get rid of leg brace by 10/28  Goal 1 Progression Percent: 30%  Barriers: Obesity, weakness  Strengths: Good support by OT/PT and wife.  Patient/Caregiver understanding: Pt states he is doing \"good\" at home.  He continues to be frustrated in regards to his mobility issues.  He was hoping to be able to ambulate with his walker again though d/t weakness of his left upper and lower extremity d/t stroke he is only able to pivot transfer.  He is not longer wearing his brace though is chairfast.  States his chirinos catheter is draining well at this time. States his wife is managing emptying hcirinos and changing over from leg bag to night bag.  Pt states his skin is dry and is applying lotion to his feet daily.  He has a oximeter at home and checking 02 sats daily and they are remaining above 94% he states. States he is eating well and \"tries to watch what he eats\"  No issues with sleep reported.     Frequency of Care Coordination: Every 3 weeks  Upcoming appointment:  TBD     Plan: Pt to continue working with PT as scheduled.  Pt to attend his f/u appointments with urology to manage his chirinos catheter.     Lucy Ortega RN  [facility]  Care Coordination  [phone]  "

## 2018-02-19 ENCOUNTER — CARE COORDINATION (OUTPATIENT)
Dept: CARE COORDINATION | Facility: CLINIC | Age: 78
End: 2018-02-19

## 2018-02-19 NOTE — PROGRESS NOTES
"  Clinic Care Coordination Contact    Situation: Spoke to pt via telephone today. Pt is scheduled for TURP surgery on 2/21/18    Background:  Pt has history of Urinary retention and UTI's.     Assessment: Pt states he is \"doing ok\" today.  He states he is anxious for his surgery so he can \"get rid of this catheter\"  He states he is eating well and trying to lose wt. He states he usually only eats a breakfast bar and then will eat a full meal at dinner time.  He is drinking water and Gatorade throughout the day.   He states he has lost 20 lbs recently and is trying to lose more but it is difficult because he is chairfast. He continues to check his BG's daily and today was 106. He states he has finished PT now and is able to walk a few steps and transfers into his van independently.  He states he is able to ambulate about 6 ft up and down his hallway with assistance from his PCA.  He denies recent falls stating that the last fall was in December 2017.  He does not have an Emergency pendant but instead has a cell phone he carries throughout the day. He continues with a PCA for assistance with ADL's in his home 2 hours a day.  His wife continues to manage his medications and offers no concerns.  He is using his nebulizer for his COPD about 1x/day.  He denies SOB and is checking 02 sats daily and is running 95% on RA.  He does not use oxygen. He denies pain.        Plan/Recommendations: No further outreach by this CC.  Pt has a [facility] managing needs at this time.     Lucy Ortega RN  [facility]  Care Coordination  [phone]      "

## 2018-02-20 RX ORDER — CEFDINIR 300 MG/1
300 CAPSULE ORAL 2 TIMES DAILY
Status: ON HOLD | COMMUNITY
End: 2018-02-21

## 2018-02-20 RX ORDER — IPRATROPIUM BROMIDE AND ALBUTEROL SULFATE 2.5; .5 MG/3ML; MG/3ML
1 SOLUTION RESPIRATORY (INHALATION) EVERY 6 HOURS PRN
Status: ON HOLD | COMMUNITY
End: 2018-02-21

## 2018-02-21 ENCOUNTER — HOSPITAL ENCOUNTER (OUTPATIENT)
Facility: CLINIC | Age: 78
Discharge: HOME | End: 2018-02-22
Attending: UROLOGY | Admitting: UROLOGY
Payer: COMMERCIAL

## 2018-02-21 ENCOUNTER — ANESTHESIA (OUTPATIENT)
Dept: SURGERY | Facility: CLINIC | Age: 78
End: 2018-02-21
Payer: COMMERCIAL

## 2018-02-21 ENCOUNTER — ANESTHESIA EVENT (OUTPATIENT)
Dept: SURGERY | Facility: CLINIC | Age: 78
End: 2018-02-21
Payer: COMMERCIAL

## 2018-02-21 DIAGNOSIS — Z98.890 POST-OPERATIVE STATE: Primary | ICD-10-CM

## 2018-02-21 LAB
ABO + RH BLD: NORMAL
ABO + RH BLD: NORMAL
BLD GP AB SCN SERPL QL: NORMAL
BLOOD BANK CMNT PATIENT-IMP: NORMAL
CREAT SERPL-MCNC: 0.78 MG/DL (ref 0.66–1.25)
GFR SERPL CREATININE-BSD FRML MDRD: >90 ML/MIN/1.7M2
GLUCOSE SERPL-MCNC: 128 MG/DL (ref 70–99)
HGB BLD-MCNC: 16.3 G/DL (ref 13.3–17.7)
POTASSIUM SERPL-SCNC: 4.4 MMOL/L (ref 3.4–5.3)
SPECIMEN EXP DATE BLD: NORMAL

## 2018-02-21 PROCEDURE — 25000128 H RX IP 250 OP 636: Performed by: UROLOGY

## 2018-02-21 PROCEDURE — 88305 TISSUE EXAM BY PATHOLOGIST: CPT | Mod: 26 | Performed by: UROLOGY

## 2018-02-21 PROCEDURE — 36000056 ZZH SURGERY LEVEL 3 1ST 30 MIN: Performed by: UROLOGY

## 2018-02-21 PROCEDURE — 25000128 H RX IP 250 OP 636: Performed by: NURSE ANESTHETIST, CERTIFIED REGISTERED

## 2018-02-21 PROCEDURE — 37000009 ZZH ANESTHESIA TECHNICAL FEE, EACH ADDTL 15 MIN: Performed by: UROLOGY

## 2018-02-21 PROCEDURE — 27210995 ZZH RX 272: Performed by: UROLOGY

## 2018-02-21 PROCEDURE — 25800025 ZZH RX 258: Performed by: UROLOGY

## 2018-02-21 PROCEDURE — 27210794 ZZH OR GENERAL SUPPLY STERILE: Performed by: UROLOGY

## 2018-02-21 PROCEDURE — 85018 HEMOGLOBIN: CPT | Performed by: ANESTHESIOLOGY

## 2018-02-21 PROCEDURE — 37000008 ZZH ANESTHESIA TECHNICAL FEE, 1ST 30 MIN: Performed by: UROLOGY

## 2018-02-21 PROCEDURE — 86900 BLOOD TYPING SEROLOGIC ABO: CPT | Performed by: UROLOGY

## 2018-02-21 PROCEDURE — 82947 ASSAY GLUCOSE BLOOD QUANT: CPT | Performed by: ANESTHESIOLOGY

## 2018-02-21 PROCEDURE — 25000125 ZZHC RX 250: Performed by: NURSE ANESTHETIST, CERTIFIED REGISTERED

## 2018-02-21 PROCEDURE — 25000125 ZZHC RX 250: Performed by: ANESTHESIOLOGY

## 2018-02-21 PROCEDURE — 86850 RBC ANTIBODY SCREEN: CPT | Performed by: UROLOGY

## 2018-02-21 PROCEDURE — 71000012 ZZH RECOVERY PHASE 1 LEVEL 1 FIRST HR: Performed by: UROLOGY

## 2018-02-21 PROCEDURE — 88305 TISSUE EXAM BY PATHOLOGIST: CPT | Performed by: UROLOGY

## 2018-02-21 PROCEDURE — 25000128 H RX IP 250 OP 636: Performed by: ANESTHESIOLOGY

## 2018-02-21 PROCEDURE — 25000132 ZZH RX MED GY IP 250 OP 250 PS 637: Performed by: UROLOGY

## 2018-02-21 PROCEDURE — 40000169 ZZH STATISTIC PRE-PROCEDURE ASSESSMENT I: Performed by: UROLOGY

## 2018-02-21 PROCEDURE — 82565 ASSAY OF CREATININE: CPT | Performed by: ANESTHESIOLOGY

## 2018-02-21 PROCEDURE — 86901 BLOOD TYPING SEROLOGIC RH(D): CPT | Performed by: UROLOGY

## 2018-02-21 PROCEDURE — 36415 COLL VENOUS BLD VENIPUNCTURE: CPT | Performed by: ANESTHESIOLOGY

## 2018-02-21 PROCEDURE — 84132 ASSAY OF SERUM POTASSIUM: CPT | Performed by: ANESTHESIOLOGY

## 2018-02-21 PROCEDURE — 36000058 ZZH SURGERY LEVEL 3 EA 15 ADDTL MIN: Performed by: UROLOGY

## 2018-02-21 PROCEDURE — 25000566 ZZH SEVOFLURANE, EA 15 MIN: Performed by: UROLOGY

## 2018-02-21 RX ORDER — NALOXONE HYDROCHLORIDE 0.4 MG/ML
.1-.4 INJECTION, SOLUTION INTRAMUSCULAR; INTRAVENOUS; SUBCUTANEOUS
Status: CANCELLED | OUTPATIENT
Start: 2018-02-21 | End: 2018-02-22

## 2018-02-21 RX ORDER — SODIUM CHLORIDE, SODIUM LACTATE, POTASSIUM CHLORIDE, CALCIUM CHLORIDE 600; 310; 30; 20 MG/100ML; MG/100ML; MG/100ML; MG/100ML
INJECTION, SOLUTION INTRAVENOUS CONTINUOUS
Status: DISCONTINUED | OUTPATIENT
Start: 2018-02-21 | End: 2018-02-21 | Stop reason: HOSPADM

## 2018-02-21 RX ORDER — MAGNESIUM HYDROXIDE 1200 MG/15ML
LIQUID ORAL PRN
Status: DISCONTINUED | OUTPATIENT
Start: 2018-02-21 | End: 2018-02-21 | Stop reason: HOSPADM

## 2018-02-21 RX ORDER — SODIUM CHLORIDE, SODIUM LACTATE, POTASSIUM CHLORIDE, CALCIUM CHLORIDE 600; 310; 30; 20 MG/100ML; MG/100ML; MG/100ML; MG/100ML
INJECTION, SOLUTION INTRAVENOUS CONTINUOUS
Status: DISCONTINUED | OUTPATIENT
Start: 2018-02-21 | End: 2018-02-22 | Stop reason: HOSPADM

## 2018-02-21 RX ORDER — OXYCODONE HYDROCHLORIDE 5 MG/1
5-10 TABLET ORAL
Status: DISCONTINUED | OUTPATIENT
Start: 2018-02-21 | End: 2018-02-22 | Stop reason: HOSPADM

## 2018-02-21 RX ORDER — DEXAMETHASONE SODIUM PHOSPHATE 4 MG/ML
INJECTION, SOLUTION INTRA-ARTICULAR; INTRALESIONAL; INTRAMUSCULAR; INTRAVENOUS; SOFT TISSUE PRN
Status: DISCONTINUED | OUTPATIENT
Start: 2018-02-21 | End: 2018-02-21

## 2018-02-21 RX ORDER — NALOXONE HYDROCHLORIDE 0.4 MG/ML
.1-.4 INJECTION, SOLUTION INTRAMUSCULAR; INTRAVENOUS; SUBCUTANEOUS
Status: DISCONTINUED | OUTPATIENT
Start: 2018-02-21 | End: 2018-02-22 | Stop reason: HOSPADM

## 2018-02-21 RX ORDER — ONDANSETRON 4 MG/1
4 TABLET, ORALLY DISINTEGRATING ORAL EVERY 6 HOURS PRN
Status: DISCONTINUED | OUTPATIENT
Start: 2018-02-21 | End: 2018-02-22 | Stop reason: HOSPADM

## 2018-02-21 RX ORDER — TAMSULOSIN HYDROCHLORIDE 0.4 MG/1
0.4 CAPSULE ORAL 2 TIMES DAILY
Status: DISCONTINUED | OUTPATIENT
Start: 2018-02-21 | End: 2018-02-22 | Stop reason: HOSPADM

## 2018-02-21 RX ORDER — MEPERIDINE HYDROCHLORIDE 25 MG/ML
12.5 INJECTION INTRAMUSCULAR; INTRAVENOUS; SUBCUTANEOUS
Status: DISCONTINUED | OUTPATIENT
Start: 2018-02-21 | End: 2018-02-21 | Stop reason: HOSPADM

## 2018-02-21 RX ORDER — HYDROMORPHONE HYDROCHLORIDE 1 MG/ML
.3-.5 INJECTION, SOLUTION INTRAMUSCULAR; INTRAVENOUS; SUBCUTANEOUS EVERY 5 MIN PRN
Status: DISCONTINUED | OUTPATIENT
Start: 2018-02-21 | End: 2018-02-21 | Stop reason: HOSPADM

## 2018-02-21 RX ORDER — HYDROMORPHONE HYDROCHLORIDE 1 MG/ML
.3-.5 INJECTION, SOLUTION INTRAMUSCULAR; INTRAVENOUS; SUBCUTANEOUS EVERY 10 MIN PRN
Status: DISCONTINUED | OUTPATIENT
Start: 2018-02-21 | End: 2018-02-21 | Stop reason: HOSPADM

## 2018-02-21 RX ORDER — ONDANSETRON 2 MG/ML
4 INJECTION INTRAMUSCULAR; INTRAVENOUS EVERY 6 HOURS PRN
Status: DISCONTINUED | OUTPATIENT
Start: 2018-02-21 | End: 2018-02-22 | Stop reason: HOSPADM

## 2018-02-21 RX ORDER — ONDANSETRON 2 MG/ML
INJECTION INTRAMUSCULAR; INTRAVENOUS PRN
Status: DISCONTINUED | OUTPATIENT
Start: 2018-02-21 | End: 2018-02-21

## 2018-02-21 RX ORDER — FENTANYL CITRATE 50 UG/ML
25-50 INJECTION, SOLUTION INTRAMUSCULAR; INTRAVENOUS
Status: DISCONTINUED | OUTPATIENT
Start: 2018-02-21 | End: 2018-02-21 | Stop reason: HOSPADM

## 2018-02-21 RX ORDER — ONDANSETRON 4 MG/1
4 TABLET, ORALLY DISINTEGRATING ORAL EVERY 30 MIN PRN
Status: DISCONTINUED | OUTPATIENT
Start: 2018-02-21 | End: 2018-02-21 | Stop reason: HOSPADM

## 2018-02-21 RX ORDER — NALOXONE HYDROCHLORIDE 0.4 MG/ML
.1-.4 INJECTION, SOLUTION INTRAMUSCULAR; INTRAVENOUS; SUBCUTANEOUS
Status: DISCONTINUED | OUTPATIENT
Start: 2018-02-21 | End: 2018-02-21 | Stop reason: HOSPADM

## 2018-02-21 RX ORDER — FENTANYL CITRATE 50 UG/ML
INJECTION, SOLUTION INTRAMUSCULAR; INTRAVENOUS PRN
Status: DISCONTINUED | OUTPATIENT
Start: 2018-02-21 | End: 2018-02-21

## 2018-02-21 RX ORDER — LIDOCAINE HYDROCHLORIDE 20 MG/ML
INJECTION, SOLUTION INFILTRATION; PERINEURAL PRN
Status: DISCONTINUED | OUTPATIENT
Start: 2018-02-21 | End: 2018-02-21

## 2018-02-21 RX ORDER — ONDANSETRON 2 MG/ML
4 INJECTION INTRAMUSCULAR; INTRAVENOUS EVERY 30 MIN PRN
Status: DISCONTINUED | OUTPATIENT
Start: 2018-02-21 | End: 2018-02-21 | Stop reason: HOSPADM

## 2018-02-21 RX ORDER — FENTANYL CITRATE 50 UG/ML
50-100 INJECTION, SOLUTION INTRAMUSCULAR; INTRAVENOUS
Status: DISCONTINUED | OUTPATIENT
Start: 2018-02-21 | End: 2018-02-21

## 2018-02-21 RX ORDER — ALLOPURINOL 300 MG/1
300 TABLET ORAL DAILY
Status: DISCONTINUED | OUTPATIENT
Start: 2018-02-22 | End: 2018-02-22 | Stop reason: HOSPADM

## 2018-02-21 RX ORDER — NEOSTIGMINE METHYLSULFATE 1 MG/ML
VIAL (ML) INJECTION PRN
Status: DISCONTINUED | OUTPATIENT
Start: 2018-02-21 | End: 2018-02-21

## 2018-02-21 RX ORDER — ALBUTEROL SULFATE 0.83 MG/ML
2.5 SOLUTION RESPIRATORY (INHALATION)
Status: DISCONTINUED | OUTPATIENT
Start: 2018-02-21 | End: 2018-02-22 | Stop reason: HOSPADM

## 2018-02-21 RX ORDER — GLYCOPYRROLATE 0.2 MG/ML
INJECTION, SOLUTION INTRAMUSCULAR; INTRAVENOUS PRN
Status: DISCONTINUED | OUTPATIENT
Start: 2018-02-21 | End: 2018-02-21

## 2018-02-21 RX ORDER — PAROXETINE 10 MG/1
10 TABLET, FILM COATED ORAL DAILY
Status: DISCONTINUED | OUTPATIENT
Start: 2018-02-22 | End: 2018-02-22 | Stop reason: HOSPADM

## 2018-02-21 RX ORDER — CEFAZOLIN SODIUM 1 G/50ML
3 SOLUTION INTRAVENOUS
Status: COMPLETED | OUTPATIENT
Start: 2018-02-21 | End: 2018-02-21

## 2018-02-21 RX ORDER — SODIUM CHLORIDE, SODIUM LACTATE, POTASSIUM CHLORIDE, CALCIUM CHLORIDE 600; 310; 30; 20 MG/100ML; MG/100ML; MG/100ML; MG/100ML
INJECTION, SOLUTION INTRAVENOUS CONTINUOUS
Status: DISCONTINUED | OUTPATIENT
Start: 2018-02-21 | End: 2018-02-21

## 2018-02-21 RX ORDER — FENTANYL CITRATE 50 UG/ML
25-50 INJECTION, SOLUTION INTRAMUSCULAR; INTRAVENOUS
Status: CANCELLED | OUTPATIENT
Start: 2018-02-21

## 2018-02-21 RX ORDER — LIDOCAINE 40 MG/G
CREAM TOPICAL
Status: DISCONTINUED | OUTPATIENT
Start: 2018-02-21 | End: 2018-02-22 | Stop reason: HOSPADM

## 2018-02-21 RX ORDER — PROPOFOL 10 MG/ML
INJECTION, EMULSION INTRAVENOUS PRN
Status: DISCONTINUED | OUTPATIENT
Start: 2018-02-21 | End: 2018-02-21

## 2018-02-21 RX ORDER — HYDROMORPHONE HYDROCHLORIDE 1 MG/ML
0.2 INJECTION, SOLUTION INTRAMUSCULAR; INTRAVENOUS; SUBCUTANEOUS
Status: DISCONTINUED | OUTPATIENT
Start: 2018-02-21 | End: 2018-02-22 | Stop reason: HOSPADM

## 2018-02-21 RX ADMIN — PROPOFOL 50 MG: 10 INJECTION, EMULSION INTRAVENOUS at 15:32

## 2018-02-21 RX ADMIN — PHENYLEPHRINE HYDROCHLORIDE 100 MCG: 10 INJECTION INTRAVENOUS at 15:10

## 2018-02-21 RX ADMIN — SODIUM CHLORIDE 3000 ML: 900 IRRIGANT IRRIGATION at 16:12

## 2018-02-21 RX ADMIN — PROPOFOL 250 MG: 10 INJECTION, EMULSION INTRAVENOUS at 14:40

## 2018-02-21 RX ADMIN — CEPHALEXIN 500 MG: 250 CAPSULE ORAL at 21:07

## 2018-02-21 RX ADMIN — METOPROLOL TARTRATE 12.5 MG: 25 TABLET ORAL at 21:07

## 2018-02-21 RX ADMIN — PHENYLEPHRINE HYDROCHLORIDE 100 MCG: 10 INJECTION INTRAVENOUS at 14:50

## 2018-02-21 RX ADMIN — LIDOCAINE HYDROCHLORIDE 100 MG: 20 INJECTION, SOLUTION INFILTRATION; PERINEURAL at 14:40

## 2018-02-21 RX ADMIN — Medication 3 G: at 14:44

## 2018-02-21 RX ADMIN — GLYCOPYRROLATE 0.8 MG: 0.2 INJECTION, SOLUTION INTRAMUSCULAR; INTRAVENOUS at 15:31

## 2018-02-21 RX ADMIN — ONDANSETRON 4 MG: 2 INJECTION INTRAMUSCULAR; INTRAVENOUS at 15:00

## 2018-02-21 RX ADMIN — FENTANYL CITRATE 100 MCG: 50 INJECTION, SOLUTION INTRAMUSCULAR; INTRAVENOUS at 14:40

## 2018-02-21 RX ADMIN — PHENYLEPHRINE HYDROCHLORIDE 100 MCG: 10 INJECTION INTRAVENOUS at 14:45

## 2018-02-21 RX ADMIN — SODIUM CHLORIDE, POTASSIUM CHLORIDE, SODIUM LACTATE AND CALCIUM CHLORIDE: 600; 310; 30; 20 INJECTION, SOLUTION INTRAVENOUS at 15:01

## 2018-02-21 RX ADMIN — PHENYLEPHRINE HYDROCHLORIDE 200 MCG: 10 INJECTION INTRAVENOUS at 14:55

## 2018-02-21 RX ADMIN — NEOSTIGMINE METHYLSULFATE 5 MG: 1 INJECTION INTRAMUSCULAR; INTRAVENOUS; SUBCUTANEOUS at 15:31

## 2018-02-21 RX ADMIN — SODIUM CHLORIDE, POTASSIUM CHLORIDE, SODIUM LACTATE AND CALCIUM CHLORIDE: 600; 310; 30; 20 INJECTION, SOLUTION INTRAVENOUS at 17:47

## 2018-02-21 RX ADMIN — PHENYLEPHRINE HYDROCHLORIDE 100 MCG: 10 INJECTION INTRAVENOUS at 14:47

## 2018-02-21 RX ADMIN — PHENYLEPHRINE HYDROCHLORIDE 100 MCG: 10 INJECTION INTRAVENOUS at 14:59

## 2018-02-21 RX ADMIN — LIDOCAINE HYDROCHLORIDE 1 ML: 10 INJECTION, SOLUTION EPIDURAL; INFILTRATION; INTRACAUDAL; PERINEURAL at 11:57

## 2018-02-21 RX ADMIN — TAMSULOSIN HYDROCHLORIDE 0.4 MG: 0.4 CAPSULE ORAL at 21:07

## 2018-02-21 RX ADMIN — PHENYLEPHRINE HYDROCHLORIDE 100 MCG: 10 INJECTION INTRAVENOUS at 14:58

## 2018-02-21 RX ADMIN — SODIUM CHLORIDE, POTASSIUM CHLORIDE, SODIUM LACTATE AND CALCIUM CHLORIDE: 600; 310; 30; 20 INJECTION, SOLUTION INTRAVENOUS at 11:57

## 2018-02-21 RX ADMIN — SODIUM CHLORIDE, POTASSIUM CHLORIDE, SODIUM LACTATE AND CALCIUM CHLORIDE: 600; 310; 30; 20 INJECTION, SOLUTION INTRAVENOUS at 22:47

## 2018-02-21 RX ADMIN — DEXAMETHASONE SODIUM PHOSPHATE 4 MG: 4 INJECTION, SOLUTION INTRA-ARTICULAR; INTRALESIONAL; INTRAMUSCULAR; INTRAVENOUS; SOFT TISSUE at 15:00

## 2018-02-21 ASSESSMENT — LIFESTYLE VARIABLES: TOBACCO_USE: 1

## 2018-02-21 ASSESSMENT — COPD QUESTIONNAIRES
COPD: 1
CAT_SEVERITY: MODERATE

## 2018-02-21 NOTE — OR NURSING
Pt came into OR with a chirinos in place attached to a leg bag. Nicole Mendoza RN discontinued chirinos after patient was intubated.    Maddy Guerra RN

## 2018-02-21 NOTE — BRIEF OP NOTE
[facility] Brief Operative Note    Pre-operative diagnosis: ACUTE URINARY RETENTION    Post-operative diagnosis: Same   Procedure: Procedure(s):  COMBINED CYSTOSCOPY, TRANSURETHRAL RESECTION (TUR) PROSTATE   Surgeon: Rob Sullivan MD, MD   Assistant(s): none   Anesthesia: General endotracheal anesthesia   Estimated blood loss: Less than 50 ml   Total IV fluids: (See anesthesia record)   Blood transfusion: No transfusion was given during surgery   Total urine output: (See anesthesia record)  Not measured   Drains: Cardona catheter   Specimens: Prostate tissue   Implants: None   Findings: See dictation.   Complications: None   Condition: Stable   Comments: See dictated operative report for full details.    Rob Sullivan MD

## 2018-02-21 NOTE — ANESTHESIA CARE TRANSFER NOTE
Patient: Ron Gilmore    Procedure(s):  COMBINED CYSTOSCOPY, TRANSURETHRAL RESECTION (TUR) PROSTATE  - Wound Class: II-Clean Contaminated    Diagnosis: ACUTE URINARY RETENTION   Diagnosis Additional Information: No value filed.    Anesthesia Type:   General, ETT     Note:  Airway :Face Mask  Patient transferred to:PACU  Comments: Extubation / Transfer of Care Note:    Ron Gilmore    Spontaneous respirations. Strong and purposeful movement. Suctioned. Extubated awake. O2 via FM.    In PACU. Monitors on. VSS. Report to RN.    2/21/2018    Preethi Duke CRNA    Handoff Report: Identifed the Patient, Identified the Reponsible Provider, Reviewed the pertinent medical history, Discussed the surgical course, Reviewed Intra-OP anesthesia mangement and issues during anesthesia, Set expectations for post-procedure period and Allowed opportunity for questions and acknowledgement of understanding      Vitals: (Last set prior to Anesthesia Care Transfer)    CRNA VITALS  2/21/2018 1523 - 2/21/2018 1557      2/21/2018             Pulse: 98    SpO2: 100 %    Resp Rate (set): 10                Electronically Signed By: ELIZABETH Cavazos CRNA  February 21, 2018  3:57 PM

## 2018-02-21 NOTE — OP NOTE
Procedure Date: 02/21/2018      PROCEDURE:  Conventional transurethral resection of prostate.      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       OPERATIVE NOTE:  Informed consent was obtained from the patient.  He was brought to the operating room, given endotracheal anesthesia, placed in lithotomy position and sterile prep and drape were applied after removal of his existing Cardona catheter.  Surgical timeout was taken.  Continuous flow resectoscope was inserted into the bladder and inspection was performed and revealed a somewhat high median bar of the prostate and obstructing lateral lobe, although no significant intravesical median lobe was seen.  There were several small bladder stones.  The bladder neck was then resected first and extending this out to and just lateral to the verumontanum.  Lateral lobe tissue was then resected from 12 o'clock to 6 o'clock position in a stepwise fashion.  Arterial bleeders were cauterized as they were encountered.        Once the resection was complete, tissue chips and the bladder stones were irrigated out from the bladder.  Final inspection revealed no evidence of ongoing arterial bleeding, no residual tissue chips in the bladder.  There were several tiny stones left in the bladder, but these should be able to get through the Cardona catheter without difficulty.  After the resectoscope was removed, a 22 Kyr[place] 3-way catheter was placed without difficulty and the balloon was inflated with 30 mL sterile water.  Continuous irrigation was begun.  He tolerated the procedure well and there were no complications.  Estimated blood loss 50 mL.  Tissue which was resected was collected and sent for permanent pathology.  He will be outpatient overnight with continuous irrigation going.  We will try to wean this in the morning and send him home with catheter.   Because the deep cut at the bladder neck, we will need to leave this catheter in for about a week.  He will return to clinic office in 1 week's time for trial voiding.  He tolerated the procedure well and there were no complications.         IRVIN SAWYER MD             D: 2018   T: 2018   MT: MICHAEL      Name:     SHERI THORPE   MRN:      [id number]        Account:        [id number]   :      1940           Procedure Date: 2018      Document: [id number]

## 2018-02-21 NOTE — IP AVS SNAPSHOT
MRN:[id number]                      After Visit Summary   2/21/2018    Ron Gilmore    MRN: [id number]           Thank you!     Thank you for choosing [facility] for your care. Our goal is always to provide you with excellent care. Hearing back from our patients is one way we can continue to improve our services. Please take a few minutes to complete the written survey that you may receive in the mail after you visit with us. Thank you!        Patient Information     Date Of Birth          1940        Designated Caregiver       Most Recent Value    Caregiver    Will someone help with your care after discharge? yes    Name of designated caregiver -- [Yuli spouse]    Phone number of caregiver     Caregiver address -- [[place]]      About your hospital stay     You were admitted on:  February 21, 2018 You last received care in the:  [place] [address] [facility]    You were discharged on:  February 22, 2018        Reason for your hospital stay       Prostate surgery                  Who to Call     For medical emergencies, please call 911.  For non-urgent questions about your medical care, please call your primary care provider or clinic, [phone]  For questions related to your surgery, please call your surgery clinic        Attending Provider     Provider Specialty    Rob Sullivan MD Urology       Primary Care Provider Office Phone # Fax #    Augustin Thomas [phone]      After Care Instructions     Activity       1. LIMIT ACTIVITY TO WALKING AND LIGHT EXERCISE 1ST 2 WEEKS. MAY USE STAIRS.  2. INCREASED ACTIVITY WITH UP TO 30-40 LBS FOR EXERCISE IF FEELING WELL BUT NO HEAVY STRAINING / LIFTING.SLOWLY INCREASE FROM WEEK 3-6.  3. CAN RESUME FULL EXERCISE AND ACTIVITY AT 6-8 WEEKS IF FEELING WELL.  4. NO BIKING 3-6 MONTHS.            Diet       Follow this diet upon discharge: Regular            Diet Instructions       Resume pre-procedure diet       "      Discharge Instructions       Patient to follow up Urology clinic Nurse next week for voiding trial.            No lifting        No lifting over 15 lbs and no strenuous physical activity for 3 weeks            Tubes and drains       You are going home with the following tubes or drains: chirinos catheter.  Tube cares per hospital or home care instructions                  Follow-up Appointments     Follow-up and recommended labs and tests        Follow up in urology clinic in 1 week for catheter removal.   Follow up with Dr. Sullivan in approx 4-6 weeks.   [facility]  [facility] ([facility])  [address], [address]  [place]. [address]  You may call [phone] with any questions or concerns.   Central Appointment #: [phone]                  Pending Results     Date and Time Order Name Status Description    2/21/2018 1530 Surgical pathology exam In process             Statement of Approval     Ordered          02/22/18 0832  I have reviewed and agree with all the recommendations and orders detailed in this document.  EFFECTIVE NOW     Approved and electronically signed by:  Carly Sofia PA-C             Admission Information     Date & Time Provider Department Dept. Phone    2/21/2018 Rob Sullivan MD [place] [address] [facility] [phone]      Your Vitals Were     Blood Pressure Pulse Temperature Respirations Height Weight    135/66 (BP Location: Right arm) 100 97.7  F (36.5  C) (Oral) 16 1.829 m (6') 136.1 kg (300 lb)    Pulse Oximetry BMI (Body Mass Index)                94% 40.69 kg/m2          MyChart Information     [facility] lets you send messages to your doctor, view your test results, renew your prescriptions, schedule appointments and more. To sign up, go to [email or web address] . Click on \"Log in\" on the left side of the screen, which will take you to the Welcome page. Then click on \"Sign up Now\" on the right side of the " page.     You will be asked to enter the access code listed below, as well as some personal information. Please follow the directions to create your username and password.     Your access code is: [email or web address]  Expires: 2018  8:46 AM     Your access code will  in 90 days. If you need help or a new code, please call your [place] clinic or [phone].        Care EveryWhere ID     This is your Care EveryWhere ID. This could be used by other organizations to access your [place] medical records  [id number]        Equal Access to Services     [facility]: Hadmiladis Armenta, cory bruner, alan meyer, ryan courtney . So [facility] [phone].    ATENCIÓN: Si habla español, tiene a dover disposición servicios gratuitos de asistencia lingüística. Llame al [phone].    We comply with applicable federal civil rights laws and [place] laws. We do not discriminate on the basis of race, color, national origin, age, disability, sex, sexual orientation, or gender identity.               Review of your medicines      START taking        Dose / Directions    cephALEXin 500 MG capsule   Commonly known as:  KEFLEX   Indication:  Preventative Medication Therapy Used Around Surgery        Dose:  500 mg   Take 1 capsule (500 mg) by mouth every 8 hours for 5 days   Quantity:  15 capsule   Refills:  0         CONTINUE these medicines which may have CHANGED, or have new prescriptions. If we are uncertain of the size of tablets/capsules you have at home, strength may be listed as something that might have changed.        Dose / Directions    albuterol (2.5 MG/3ML) 0.083% neb solution   This may have changed:  when to take this   Used for:  COPD exacerbation (H)        Dose:  2.5 mg   Take 1 vial (2.5 mg) by nebulization every 2 hours as needed for shortness of breath / dyspnea or other (dyspnea)   Quantity:  360 mL   Refills:  0         CONTINUE these medicines which  have NOT CHANGED        Dose / Directions    ALLOPURINOL PO        Dose:  300 mg   Take 300 mg by mouth daily   Refills:  0       AMLACTIN ULTRA EX        Externally apply topically daily APPLY TO FEET   Refills:  0       ASPIRIN PO        Dose:  81 mg   Take 81 mg by mouth daily   Refills:  0       FLOMAX 0.4 MG capsule   Generic drug:  tamsulosin        Dose:  0.4 mg   Take 0.4 mg by mouth 2 times daily   Refills:  0       fluocinonide-emollient 0.05 % cream   Commonly known as:  LIDEX-E        Apply topically 2 times daily as needed   Refills:  0       LOPRESSOR PO        Dose:  12.5 mg   Take 12.5 mg by mouth 2 times daily (0.5 x 25 mg tablet = 12.5 mg dose)   Refills:  0       order for DME   Used for:  COPD exacerbation (H)        Equipment being ordered: Other: Home nebulizer Treatment Diagnosis: COPD/Pneumonia   Quantity:  1 Device   Refills:  0       PAXIL PO        Dose:  10 mg   Take 10 mg by mouth daily   Refills:  0            Where to get your medicines      These medications were sent to [id number] - [place] - [address]  AT [address] & [address] [place] [address]     Phone:  [phone]     cephALEXin 500 MG capsule                Protect others around you: Learn how to safely use, store and throw away your medicines at www.disposemymeds.org.        ANTIBIOTIC INSTRUCTION     You've Been Prescribed an Antibiotic - Now What?  Your healthcare team thinks that you or your loved one might have an infection. Some infections can be treated with antibiotics, which are powerful, life-saving drugs. Like all medications, antibiotics have side effects and should only be used when necessary. There are some important things you should know about your antibiotic treatment.      Your healthcare team may run tests before you start taking an antibiotic.    Your team may take samples (e.g., from your blood, urine or other areas) to run tests to look for  bacteria. These test can be important to determine if you need an antibiotic at all and, if you do, which antibiotic will work best.      Within a few days, your healthcare team might change or even stop your antibiotic.    Your team may start you on an antibiotic while they are working to find out what is making you sick.    Your team might change your antibiotic because test results show that a different antibiotic would be better to treat your infection.    In some cases, once your team has more information, they learn that you do not need an antibiotic at all. They may find out that you don't have an infection, or that the antibiotic you're taking won't work against your infection. For example, an infection caused by a virus can't be treated with antibiotics. Staying on an antibiotic when you don't need it is more likely to be harmful than helpful.      You may experience side effects from your antibiotic.    Like all medications, antibiotics have side effects. Some of these can be serious.    Let you healthcare team know if you have any known allergies when you are admitted to the hospital.    One significant side effect of nearly all antibiotics is the risk of severe and sometimes deadly diarrhea caused by Clostridium difficile (C. Difficile). This occurs when a person takes antibiotics because some good germs are destroyed. Antibiotic use allows C. diificile to take over, putting patients at high risk for this serious infection.    As a patient or caregiver, it is important to understand your or your loved one's antibiotic treatment. It is especially important for caregivers to speak up when patients can't speak for themselves. Here are some important questions to ask your healthcare team.    What infection is this antibiotic treating and how do you know I have that infection?    What side effects might occur from this antibiotic?    How long will I need to take this antibiotic?    Is it safe to take this  antibiotic with other medications or supplements (e.g., vitamins) that I am taking?     Are there any special directions I need to know about taking this antibiotic? For example, should I take it with food?    How will I be monitored to know whether my infection is responding to the antibiotic?    What tests may help to make sure the right antibiotic is prescribed for me?      Information provided by:  www.cdc.gov/getsmart  U.S. Department of Health and Human Services  Centers for disease Control and Prevention  National Center for Emerging and Zoonotic Infectious Diseases  Division of Healthcare Quality Promotion             Medication List: This is a list of all your medications and when to take them. Check marks below indicate your daily home schedule. Keep this list as a reference.      Medications           Morning Afternoon Evening Bedtime As Needed    albuterol (2.5 MG/3ML) 0.083% neb solution   Take 1 vial (2.5 mg) by nebulization every 2 hours as needed for shortness of breath / dyspnea or other (dyspnea)   Next Dose Due:  As needed every 2 hours                                   ALLOPURINOL PO   Take 300 mg by mouth daily   Last time this was given:  300 mg on 2/22/2018 10:10 AM   Next Dose Due:  2/23/18 morning                                   AMLACTIN ULTRA EX   Externally apply topically daily APPLY TO FEET   Next Dose Due:  2/22/18 evening                                   ASPIRIN PO   Take 81 mg by mouth daily   Next Dose Due:  2/23/18 morning                                   cephALEXin 500 MG capsule   Commonly known as:  KEFLEX   Take 1 capsule (500 mg) by mouth every 8 hours for 5 days   Last time this was given:  500 mg on 2/22/2018  6:04 AM   Next Dose Due:  2/22/18 2pm                                         FLOMAX 0.4 MG capsule   Take 0.4 mg by mouth 2 times daily   Last time this was given:  0.4 mg on 2/22/2018 10:10 AM   Generic drug:  tamsulosin   Next Dose Due:  2/22/18 evening                                       fluocinonide-emollient 0.05 % cream   Commonly known as:  LIDEX-E   Apply topically 2 times daily as needed   Next Dose Due:  As needed                                   LOPRESSOR PO   Take 12.5 mg by mouth 2 times daily (0.5 x 25 mg tablet = 12.5 mg dose)   Last time this was given:  12.5 mg on 2/22/2018 10:10 AM   Next Dose Due:  2/22/18 evening                                      order for DME   Equipment being ordered: Other: Home nebulizer Treatment Diagnosis: COPD/Pneumonia                                PAXIL PO   Take 10 mg by mouth daily   Last time this was given:  10 mg on 2/22/2018 10:11 AM   Next Dose Due:  2/23/18 morning

## 2018-02-21 NOTE — PROGRESS NOTES
Admission medication history interview status for the 2/21/2018  admission is complete. See EPIC admission navigator for prior to admission medications     Medication history source reliability:Good    Medication history interview source(s):Patient    Medication history resources (including written lists, pill bottles, clinic record):Patient mailed in his medication list prior to surgery    Primary pharmacy.[email or web address]    Additional medication history information not noted on PTA med list :None    Time spent in this activity: 40 minutes    Prior to Admission medications    Medication Sig Last Dose Taking? Auth Provider   Metoprolol Tartrate (LOPRESSOR PO) Take 12.5 mg by mouth 2 times daily (0.5 x 25 mg tablet = 12.5 mg dose) 2/21/2018 at 0900 Yes Reported, Patient   Emollient (AMLACTIN ULTRA EX) Externally apply topically daily APPLY TO FEET  2/20/2018 at AM Yes Reported, Patient   fluocinonide-emollient (LIDEX-E) 0.05 % cream Apply topically 2 times daily as needed 2/19/2018 at PRN Yes Reported, Patient   albuterol (2.5 MG/3ML) 0.083% neb solution Take 1 vial (2.5 mg) by nebulization every 2 hours as needed for shortness of breath / dyspnea or other (dyspnea)  Patient taking differently: Take 2.5 mg by nebulization daily  2/20/2018 at PM Yes Wali Ibrhaim MD   ALLOPURINOL PO Take 300 mg by mouth daily 2/21/2018 at 0900 Yes Unknown, Entered By History   PARoxetine HCl (PAXIL PO) Take 10 mg by mouth daily 2/21/2018 at 0900 Yes Unknown, Entered By History   tamsulosin (FLOMAX) 0.4 MG 24 hr capsule Take 0.4 mg by mouth 2 times daily  2/21/2018 at 0900 Yes Unknown, Entered By History   ASPIRIN PO Take 81 mg by mouth daily 2/7/2018 at AM  Reported, Patient   order for DME Equipment being ordered: Other: Home nebulizer  Treatment Diagnosis: COPD/Pneumonia   Wali Ibrahim MD

## 2018-02-21 NOTE — IP AVS SNAPSHOT
[address]    [place] [address]    Phone:  [phone]                                       After Visit Summary   2/21/2018    Ron Gilmore    MRN: [id number]           After Visit Summary Signature Page     I have received my discharge instructions, and my questions have been answered. I have discussed any challenges I see with this plan with the nurse or doctor.    ..........................................................................................................................................  Patient/Patient Representative Signature      ..........................................................................................................................................  Patient Representative Print Name and Relationship to Patient    ..................................................               ................................................  Date                                            Time    ..........................................................................................................................................  Reviewed by Signature/Title    ...................................................              ..............................................  Date                                                            Time

## 2018-02-21 NOTE — ANESTHESIA PREPROCEDURE EVALUATION
Anesthesia Evaluation     . Pt has had prior anesthetic.     No history of anesthetic complications          ROS/MED HX    ENT/Pulmonary:     (+)BRAD risk factors hypertension, obese, tobacco use, Past use moderate COPD, , . .   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 : . . . :. .      (-) CAD and CHF   METS/Exercise Tolerance:     Hematologic:         Musculoskeletal: Comment: Lumbar surgery        GI/Hepatic:        (-) GERD and liver disease   Renal/Genitourinary:     (+) Nephrolithiasis , BPH,    (-) renal disease   Endo:     (+) type II DM Not using insulin - not using insulin pump Obesity, .      Psychiatric:     (+) psychiatric history depression      Infectious Disease:         Malignancy:         Other:                     Physical Exam      Airway   Mallampati: III  TM distance: >3 FB  Neck ROM: full    Dental   (+) upper dentures and lower dentures    Cardiovascular   Rhythm and rate: regular and normal      Pulmonary (+) decreased breath sounds                       Anesthesia Plan      History & Physical Review  History and physical reviewed and following examination; no interval change.    ASA Status:  3 .    NPO Status:  > 8 hours    Plan for General and ETT with Intravenous induction. Maintenance will be Inhalation.    PONV prophylaxis:  Ondansetron (or other 5HT-3) and Dexamethasone or Solumedrol       Postoperative Care  Postoperative pain management:  IV analgesics.      Consents  Anesthetic plan, risks, benefits and alternatives discussed with:  Patient..                          .

## 2018-02-21 NOTE — ANESTHESIA POSTPROCEDURE EVALUATION
Patient: Ron Gilmore    Procedure(s):  COMBINED CYSTOSCOPY, TRANSURETHRAL RESECTION (TUR) PROSTATE  - Wound Class: II-Clean Contaminated    Diagnosis:ACUTE URINARY RETENTION   Diagnosis Additional Information: No value filed.    Anesthesia Type:  General, ETT    Note:  Anesthesia Post Evaluation    Patient location during evaluation: PACU  Patient participation: Able to fully participate in evaluation  Level of consciousness: awake and alert  Pain management: satisfactory to patient  Airway patency: patent  Cardiovascular status: hemodynamically stable  Respiratory status: acceptable and unassisted  Hydration status: balanced  PONV: none     Anesthetic complications: None          Last vitals:  Vitals:    02/21/18 1620 02/21/18 1630 02/21/18 1640   BP: (!) 139/91 145/83 157/87   Pulse:      Resp: 17 19 14   Temp:      SpO2: 95% 98% 96%         Electronically Signed By: Edward Martin MD  February 21, 2018  4:54 PM

## 2018-02-22 VITALS
RESPIRATION RATE: 16 BRPM | BODY MASS INDEX: 40.63 KG/M2 | DIASTOLIC BLOOD PRESSURE: 66 MMHG | SYSTOLIC BLOOD PRESSURE: 135 MMHG | HEART RATE: 100 BPM | WEIGHT: 300 LBS | HEIGHT: 72 IN | TEMPERATURE: 97.7 F | OXYGEN SATURATION: 94 %

## 2018-02-22 LAB — COPATH REPORT: NORMAL

## 2018-02-22 PROCEDURE — 25000132 ZZH RX MED GY IP 250 OP 250 PS 637: Performed by: UROLOGY

## 2018-02-22 RX ORDER — CEPHALEXIN 500 MG/1
500 CAPSULE ORAL EVERY 8 HOURS
Qty: 15 CAPSULE | Refills: 0 | Status: SHIPPED | OUTPATIENT
Start: 2018-02-22 | End: 2018-02-27

## 2018-02-22 RX ADMIN — CEPHALEXIN 500 MG: 250 CAPSULE ORAL at 06:04

## 2018-02-22 RX ADMIN — PAROXETINE HYDROCHLORIDE 10 MG: 10 TABLET, FILM COATED ORAL at 10:11

## 2018-02-22 RX ADMIN — METOPROLOL TARTRATE 12.5 MG: 25 TABLET ORAL at 10:10

## 2018-02-22 RX ADMIN — ALLOPURINOL 300 MG: 300 TABLET ORAL at 10:10

## 2018-02-22 RX ADMIN — TAMSULOSIN HYDROCHLORIDE 0.4 MG: 0.4 CAPSULE ORAL at 10:10

## 2018-02-22 NOTE — PROGRESS NOTES
Cath care was done with the patient, chirinos bag switched to leg bag. Patient said that he had a cath at home in the past and both himself and his wife are aware how to care of it.

## 2018-02-22 NOTE — PLAN OF CARE
Problem: Patient Care Overview  Goal: Plan of Care/Patient Progress Review  Outcome: No Change  POD#0 from combined cystoscopy and TURP. A/ox4, drowsy arriving to floor but improved later in evening. VSS on 3L O2 via NC. CAPNO in place - WNL. LS diminished w/ frequent, dry, nonproductive cough - CPAP ordered but pt refused at bedtime. Skin intact with pink/blanchable coccyx. Full liquid diet, advance as tolerated - tolerated diet well for supper - denies nausea and vomiting. Passing flautus. BS normoactive. Cardona patent with watermelon/peach output - CBI running a slow rate. L PIV infusing LR @ 100mL/hr. Discharge pending. Continue to monitor.

## 2018-02-22 NOTE — PROGRESS NOTES
Patient discharged at 1:25 PM to home.  IV was discontinued. Pain at time of discharge was 0/10. Belongings returned to patient.  Discharge instructions and medications reviewed with patient.  Patient verbalized understanding and all questions were answered.  At time of discharge, patient condition was stable and left the unit in wc escorted by MICHAEL. Son-in-law transporting. Patient to follow-up with urology. Prescriptions faxed to [facility].

## 2018-02-22 NOTE — PROGRESS NOTES
Pt capno alarming as soon as he falls asleep, sats dip and CO2 rises. May need consult for home CPAP.

## 2018-02-22 NOTE — DISCHARGE SUMMARY
[facility]    Discharge Summary  Urology    Date of Admission:  2/21/2018  Date of Discharge:  2/22/2018  Discharging Provider: Carly Sofia PA-C    Discharge Diagnoses   Active Problems:    Post-operative state  Bladder outlet obstruction and chronic urinary retention     History of Present Illness   Ron Gilmore is an 77 year old male who is POD #1 s/p Conventional transurethral resection of prostate for Bladder outlet obstruction and chronic urinary retention.     Hospital Course   Ron Gilmore was admitted on 2/21/2018.  The following problems were addressed during his hospitalization: bladder outlet obstruction and urinary retention. Perioperative and hospital course were relatively unremarkable. Diet was advanced without issue. Vitals remained stable. Passing flatus prior to discharge. CBI was weaned to pink urine. Tolerating chirinos without concern.     Plan: Home today with chirinos and 5 day course of antibiotic. Tylenol for pain.   -Follow up in clinic late next week for TOV.   -Follow up with Dr. Sullivan in 4-6wks.     LIMIT ACTIVITY TO WALKING AND LIGHT EXERCISE 1ST 2 WEEKS. MAY USE STAIRS. INCREASED ACTIVITY WITH UP TO 30-40 LBS FOR EXERCISE IF FEELING WELL BUT NO HEAVY STRAINING / LIFTING.SLOWLY INCREASE FROM WEEK 3-6. CAN RESUME FULL EXERCISE AND ACTIVITY AT 6-8 WEEKS IF FEELING WELL.    Carly Sofia PA-C  [facility], [address] [address]  [phone]  [email or web address]  Text Page (7am to 5pm)    Significant Results and Procedures   Conventional transurethral resection of prostate for Bladder outlet obstruction and chronic urinary retention.     Pending Results   These results will be followed up by Dr. Sullivan   Unresulted Labs Ordered in the Past 30 Days of this Admission     Date and Time Order Name Status Description    2/21/2018 1530 Surgical pathology exam In process           Code Status   Full  Code    Primary Care Physician   Augustin Thomas    Physical Exam   Temp: 98.4  F (36.9  C) Temp src: Oral BP: 141/66 Pulse: 100 Heart Rate: 70 Resp: 16 SpO2: 95 % O2 Device: Nasal cannula Oxygen Delivery: 3 LPM  Vitals:    02/21/18 1129   Weight: 136.1 kg (300 lb)     Vital Signs with Ranges  Temp:  [96.8  F (36  C)-98.4  F (36.9  C)] 98.4  F (36.9  C)  Pulse:  [100] 100  Heart Rate:  [] 70  Resp:  [13-24] 16  BP: (113-159)/(64-91) 141/66  SpO2:  [88 %-99 %] 95 %  I/O last 3 completed shifts:  In: 2097 [P.O.:480; I.V.:1617]  Out: 50 [Blood:50]    Constitutional: Sitting up in bed, NAD  Eyes: no icterus  ENT: normocephalic  Respiratory: breathing without difficulty, on nasal oxygen   Cardiovascular: chest wall symmetric   GI: mildly distended, non tender  Lymph/Hematologic: no LE edema   Genitourinary: chirinos in place, draining clear watermelon urine. CBI clamped.   Skin: well perfused  Neuropsychiatric: A&Ox3    Time Spent on this Encounter   I, Carly Sofia, personally saw the patient today and spent less than or equal to 30 minutes discharging this patient.    Discharge Disposition   Discharged to home  Condition at discharge: Stable    Consultations This Hospital Stay   None    Discharge Orders     No lifting    No lifting over 15 lbs and no strenuous physical activity for 3 weeks     Diet Instructions   Resume pre-procedure diet     Discharge Instructions   Patient to follow up Urology clinic Nurse next week for voiding trial.     Reason for your hospital stay   Prostate surgery     Follow-up and recommended labs and tests    Follow up in urology clinic in 1 week for catheter removal.   Follow up with Dr. Sullivan in approx 4-6 weeks.   [facility]  [facility] ([facility])  [address] [place] [address], [address]  [place]. [address]  You may call [phone] with any questions or concerns.   Central Appointment #: [phone]     Activity   1. LIMIT ACTIVITY TO  WALKING AND LIGHT EXERCISE 1ST 2 WEEKS. MAY USE STAIRS.  2. INCREASED ACTIVITY WITH UP TO 30-40 LBS FOR EXERCISE IF FEELING WELL BUT NO HEAVY STRAINING / LIFTING.SLOWLY INCREASE FROM WEEK 3-6.  3. CAN RESUME FULL EXERCISE AND ACTIVITY AT 6-8 WEEKS IF FEELING WELL.  4. NO BIKING 3-6 MONTHS.     Tubes and drains   You are going home with the following tubes or drains: chirinos catheter.  Tube cares per hospital or home care instructions     Full Code     Diet   Follow this diet upon discharge: Regular       Discharge Medications   Current Discharge Medication List      CONTINUE these medications which have NOT CHANGED    Details   Metoprolol Tartrate (LOPRESSOR PO) Take 12.5 mg by mouth 2 times daily (0.5 x 25 mg tablet = 12.5 mg dose)      Emollient (AMLACTIN ULTRA EX) Externally apply topically daily APPLY TO FEET       fluocinonide-emollient (LIDEX-E) 0.05 % cream Apply topically 2 times daily as needed      albuterol (2.5 MG/3ML) 0.083% neb solution Take 1 vial (2.5 mg) by nebulization every 2 hours as needed for shortness of breath / dyspnea or other (dyspnea)  Qty: 360 mL, Refills: 0    Associated Diagnoses: COPD exacerbation (H)      ALLOPURINOL PO Take 300 mg by mouth daily      PARoxetine HCl (PAXIL PO) Take 10 mg by mouth daily      tamsulosin (FLOMAX) 0.4 MG 24 hr capsule Take 0.4 mg by mouth 2 times daily       ASPIRIN PO Take 81 mg by mouth daily      order for DME Equipment being ordered: Other: Home nebulizer  Treatment Diagnosis: COPD/Pneumonia  Qty: 1 Device, Refills: 0    Associated Diagnoses: COPD exacerbation (H)           Allergies   No Known Allergies  Data   Most Recent 3 CBC's:  Recent Labs   Lab Test  02/21/18   1116  10/05/17   0431  10/04/17   1400   06/14/17   0717   WBC   --   9.5  7.3   --   11.7*   HGB  16.3  13.9  13.8   < >  12.9*   MCV   --   93  93   --   96   PLT   --   164  139*   --   165    < > = values in this interval not displayed.      Most Recent 3 BMP's:  Recent Labs   Lab  Test  02/21/18   1116  10/05/17   0431  10/04/17   1400 06/27/17 06/23/17   2100   NA   --   137  136  135*  135   POTASSIUM  4.4  4.4  4.5  4.4  4.4   CHLORIDE   --   101  101  100  100   CO2   --   28  29  29  31   BUN   --   17  18  22  24   CR  0.78  0.92  0.99  0.92  0.92   ANIONGAP   --   8  6   --   4   RAJ   --   8.8  8.8  8.5  8.4*   GLC  128*  114*  103*  96  152*     Most Recent 2 LFT's:  Recent Labs   Lab Test  10/04/17   1400  10/25/15   1005   AST  11  18   ALT  19  31   ALKPHOS  83  48   BILITOTAL  0.5  0.8     Most Recent INR's and Anticoagulation Dosing History:  Anticoagulation Dose History     Recent Dosing and Labs Latest Ref Rng & Units 7/13/2006    INR 0.86 - 1.14 1.01        Most Recent 3 Troponin's:No lab results found.  Most Recent Cholesterol Panel:No lab results found.  Most Recent 6 Bacteria Isolates From Any Culture (See EPIC Reports for Culture Details):  Recent Labs   Lab Test  10/05/17   0358  10/04/17   1312  06/16/17   2104  06/13/17   0425  06/13/17   0350  10/25/15   0800   CULT  >100,000 colonies/mL  Enterococcus faecalis  *  Susceptibility testing done on previous specimen  >100,000 colonies/mL  Enterococcus faecalis  *  Moderate growth Normal patty  No growth  No growth  No growth     Most Recent TSH, T4 and A1c Labs:No lab results found.

## 2018-02-22 NOTE — PLAN OF CARE
Problem: Patient Care Overview  Goal: Plan of Care/Patient Progress Review  POD#1 from combined cystoscopy and TURP. A/ox4VSS on 3L O2 via NC. CAPNO in place - WNL while awake but desats when asleep. LS diminished.CPAP ordered on evenings but pt refused, may need to be consulted on one. Skin intact with pink/blanchable coccyx. Passing flautus. BS normoactive. Cardona patent with watermelon/peach output - CBI running a slow rate. L PIV infusing LR @ 100mL/hr. Discharge pending. Continue to monitor.

## 2018-02-22 NOTE — PROGRESS NOTES
Pt offered CPAP for sleep, but refused. Does not wear one at home and states he's never been diagnosed with sleep apnea.    2/22/2018  Nicole Monreal RRT

## 2018-03-14 ENCOUNTER — HOSPITAL ENCOUNTER (EMERGENCY)
Facility: CLINIC | Age: 78
Discharge: HOME | End: 2018-03-15
Attending: EMERGENCY MEDICINE | Admitting: EMERGENCY MEDICINE
Payer: COMMERCIAL

## 2018-03-14 DIAGNOSIS — R30.0 DYSURIA: ICD-10-CM

## 2018-03-14 DIAGNOSIS — R33.9 URINARY RETENTION: ICD-10-CM

## 2018-03-14 PROCEDURE — 99284 EMERGENCY DEPT VISIT MOD MDM: CPT | Mod: 25

## 2018-03-14 PROCEDURE — 87186 SC STD MICRODIL/AGAR DIL: CPT | Performed by: EMERGENCY MEDICINE

## 2018-03-14 PROCEDURE — 81001 URINALYSIS AUTO W/SCOPE: CPT | Performed by: EMERGENCY MEDICINE

## 2018-03-14 PROCEDURE — 87088 URINE BACTERIA CULTURE: CPT | Performed by: EMERGENCY MEDICINE

## 2018-03-14 PROCEDURE — 51798 US URINE CAPACITY MEASURE: CPT

## 2018-03-14 PROCEDURE — 51702 INSERT TEMP BLADDER CATH: CPT

## 2018-03-14 PROCEDURE — 87086 URINE CULTURE/COLONY COUNT: CPT | Performed by: EMERGENCY MEDICINE

## 2018-03-14 NOTE — ED AVS SNAPSHOT
Emergency Department    [address] [place] [address]    Phone:  [phone]    Fax:  [phone]                                       Ron Gilmore   MRN: [id number]    Department:   Emergency Department   Date of Visit:  3/14/2018           Patient Information     Date Of Birth          1940        Your diagnoses for this visit were:     Dysuria     Urinary retention        You were seen by Jostin Vázquez MD.      Follow-up Information     Follow up with Rob Sullivan MD In 1 week.    Specialty:  Urology    Why:  As needed    Contact information:    [facility]  [address] [facility] [address]  [place] [address] [phone]          Discharge Instructions         Cardona Catheter Care    A Cardona catheter is a rubber tube that is placed through the urethra (opening where urine comes out) and into the bladder. This helps drain urine from the bladder. There is a small balloon on the end of the tube that is inflated after insertion. This keeps the catheter from sliding out of the bladder.  A Cardona catheter is used to treat urinary retention (unable to pass urine). It is also used when there is incontinence (loss of bladder control).  Home care    Finish taking any prescribed antibiotic even if you are feeling better before then.    It is important to keep bacteria from getting into the collection bag. Do not disconnect the catheter from the collection bag.    Use a leg band to secure the drainage tube, so it does not pull on the catheter. Drain the collection bag when it becomes full using the drain spout at the bottom of the bag.    Do not try to pull or remove your catheter. This will injure your urethra. It must be removed by your healthcare provider or nurse.  Follow-up care  Follow up with your healthcare provider as advised for repeat urine testing and catheter removal or replacement.  When to seek medical advice  Call your healthcare provider right away if  any of these occur:    Fever of 100.4 F (38 C) or higher, or as directed by your healthcare provider    Bladder pain or fullness    Abdominal swelling, nausea or vomiting, or back pain    Blood or urine leakage around the catheter    Bloody urine coming from the catheter (if a new symptom)    Catheter falls out    Catheter stops draining for 6 hours    Weakness, dizziness, or fainting  Date Last Reviewed: 10/1/2016    6649-4034 [employer] [address] [place] [address], [place] [address]. All rights reserved. This information is not intended as a substitute for professional medical care. Always follow your healthcare professional's instructions.          24 Hour Appointment Hotline       To make an appointment at any [facility], call [phone] ([phone]). If you don't have a family doctor or clinic, we will help you find one. [facility] clinics are conveniently located to serve the needs of you and your family.             Review of your medicines      START taking        Dose / Directions Last dose taken    cephALEXin 500 MG capsule   Commonly known as:  KEFLEX   Dose:  500 mg   Quantity:  15 capsule        Take 1 capsule (500 mg) by mouth 3 times daily for 5 days   Refills:  0          Our records show that you are taking the medicines listed below. If these are incorrect, please call your family doctor or clinic.        Dose / Directions Last dose taken    albuterol (2.5 MG/3ML) 0.083% neb solution   Dose:  2.5 mg   Quantity:  360 mL        Take 1 vial (2.5 mg) by nebulization every 2 hours as needed for shortness of breath / dyspnea or other (dyspnea)   Refills:  0        ALLOPURINOL PO   Dose:  300 mg        Take 300 mg by mouth daily   Refills:  0        AMLACTIN ULTRA EX        Externally apply topically daily APPLY TO FEET   Refills:  0        ASPIRIN PO   Dose:  81 mg        Take 81 mg by mouth daily   Refills:  0        FLOMAX 0.4 MG capsule   Dose:  0.4 mg   Generic drug:  tamsulosin         Take 0.4 mg by mouth 2 times daily   Refills:  0        fluocinonide-emollient 0.05 % cream   Commonly known as:  LIDEX-E        Apply topically 2 times daily as needed   Refills:  0        LOPRESSOR PO   Dose:  12.5 mg        Take 12.5 mg by mouth 2 times daily (0.5 x 25 mg tablet = 12.5 mg dose)   Refills:  0        order for DME   Quantity:  1 Device        Equipment being ordered: Other: Home nebulizer Treatment Diagnosis: COPD/Pneumonia   Refills:  0        PAXIL PO   Dose:  10 mg        Take 10 mg by mouth daily   Refills:  0                Prescriptions were sent or printed at these locations (1 Prescription)                   Other Prescriptions                Printed at Department/Unit printer (1 of 1)         cephALEXin (KEFLEX) 500 MG capsule                Procedures and tests performed during your visit     Leg bag    UA with Microscopic    Urine Culture      Orders Needing Specimen Collection     None      Pending Results     Date and Time Order Name Status Description    3/14/2018 2354 Urine Culture In process             Pending Culture Results     Date and Time Order Name Status Description    3/14/2018 2354 Urine Culture In process             Pending Results Instructions     If you had any lab results that were not finalized at the time of your Discharge, you can call the ED Lab Result RN at [phone]. You will be contacted by this team for any positive Lab results or changes in treatment. The nurses are available 7 days a week from 10A to 6:30P.  You can leave a message 24 hours per day and they will return your call.        Test Results From Your Hospital Stay        3/15/2018 12:09 AM      Component Results     Component Value Ref Range & Units Status    Color Urine Yellow  Final    Appearance Urine Slightly Cloudy  Final    Glucose Urine Negative NEG^Negative mg/dL Final    Bilirubin Urine Negative NEG^Negative Final    Ketones Urine Negative NEG^Negative mg/dL Final    Specific  Gravity Urine 1.012 1.003 - 1.035 Final    Blood Urine Moderate (A) NEG^Negative Final    pH Urine 6.0 5.0 - 7.0 pH Final    Protein Albumin Urine 30 (A) NEG^Negative mg/dL Final    Urobilinogen mg/dL Normal 0.0 - 2.0 mg/dL Final    Nitrite Urine Positive (A) NEG^Negative Final    Leukocyte Esterase Urine Large (A) NEG^Negative Final    Source Catheterized Urine  Final    WBC Urine >182 (H) 0 - 5 /HPF Final    RBC Urine >182 (H) 0 - 2 /HPF Final    WBC Clumps Present (A) NEG^Negative /HPF Final    Mucous Urine Present (A) NEG^Negative /LPF Final         3/15/2018 12:00 AM                Clinical Quality Measure: Blood Pressure Screening     Your blood pressure was checked while you were in the emergency department today. The last reading we obtained was  BP: 120/75 . Please read the guidelines below about what these numbers mean and what you should do about them.  If your systolic blood pressure (the top number) is less than 120 and your diastolic blood pressure (the bottom number) is less than 80, then your blood pressure is normal. There is nothing more that you need to do about it.  If your systolic blood pressure (the top number) is 120-139 or your diastolic blood pressure (the bottom number) is 80-89, your blood pressure may be higher than it should be. You should have your blood pressure rechecked within a year by a primary care provider.  If your systolic blood pressure (the top number) is 140 or greater or your diastolic blood pressure (the bottom number) is 90 or greater, you may have high blood pressure. High blood pressure is treatable, but if left untreated over time it can put you at risk for heart attack, stroke, or kidney failure. You should have your blood pressure rechecked by a primary care provider within the next 4 weeks.  If your provider in the emergency department today gave you specific instructions to follow-up with your doctor or provider even sooner than that, you should follow that  "instruction and not wait for up to 4 weeks for your follow-up visit.        Thank you for choosing [facility]       Thank you for choosing [facility] for your care. Our goal is always to provide you with excellent care. Hearing back from our patients is one way we can continue to improve our services. Please take a few minutes to complete the written survey that you may receive in the mail after you visit with us. Thank you!        [employer] Information     [employer] lets you send messages to your doctor, view your test results, renew your prescriptions, schedule appointments and more. To sign up, go to [email or web address] . Click on \"Log in\" on the left side of the screen, which will take you to the Welcome page. Then click on \"Sign up Now\" on the right side of the page.     You will be asked to enter the access code listed below, as well as some personal information. Please follow the directions to create your username and password.     Your access code is: [email or web address]  Expires: 2018  9:46 AM     Your access code will  in 90 days. If you need help or a new code, please call your [facility] clinic or [phone].        Care EveryWhere ID     This is your Care EveryWhere ID. This could be used by other organizations to access your [facility] medical records  [id number]        Equal Access to Services     MICHELLE MEIER : Shelley Armenta, waaxda luqadaha, qaybta kaalmada betsy, ryan courtney . So [facility] [phone].    ATENCIÓN: Si habla español, tiene a dover disposición servicios gratuitos de asistencia lingüística. Llame al [phone].    We comply with applicable federal civil rights laws and [place] laws. We do not discriminate on the basis of race, color, national origin, age, disability, sex, sexual orientation, or gender identity.            After Visit Summary       This is your record. Keep this with you and show to your community pharmacist(s) and " doctor(s) at your next visit.

## 2018-03-14 NOTE — ED AVS SNAPSHOT
Emergency Department    [address] [facility] [address]    Phone:  [phone]    Fax:  [phone]                                       Ron Gilmore   MRN: [id number]    Department:   Emergency Department   Date of Visit:  3/14/2018           After Visit Summary Signature Page     I have received my discharge instructions, and my questions have been answered. I have discussed any challenges I see with this plan with the nurse or doctor.    ..........................................................................................................................................  Patient/Patient Representative Signature      ..........................................................................................................................................  Patient Representative Print Name and Relationship to Patient    ..................................................               ................................................  Date                                            Time    ..........................................................................................................................................  Reviewed by Signature/Title    ...................................................              ..............................................  Date                                                            Time

## 2018-03-15 VITALS
BODY MASS INDEX: 40.63 KG/M2 | SYSTOLIC BLOOD PRESSURE: 122 MMHG | WEIGHT: 300 LBS | OXYGEN SATURATION: 94 % | HEIGHT: 72 IN | DIASTOLIC BLOOD PRESSURE: 74 MMHG | TEMPERATURE: 98.4 F | RESPIRATION RATE: 18 BRPM | HEART RATE: 86 BPM

## 2018-03-15 LAB
ALBUMIN UR-MCNC: 30 MG/DL
APPEARANCE UR: ABNORMAL
BILIRUB UR QL STRIP: NEGATIVE
COLOR UR AUTO: YELLOW
GLUCOSE UR STRIP-MCNC: NEGATIVE MG/DL
HGB UR QL STRIP: ABNORMAL
KETONES UR STRIP-MCNC: NEGATIVE MG/DL
LEUKOCYTE ESTERASE UR QL STRIP: ABNORMAL
MUCOUS THREADS #/AREA URNS LPF: PRESENT /LPF
NITRATE UR QL: POSITIVE
PH UR STRIP: 6 PH (ref 5–7)
RBC #/AREA URNS AUTO: >182 /HPF (ref 0–2)
SOURCE: ABNORMAL
SP GR UR STRIP: 1.01 (ref 1–1.03)
UROBILINOGEN UR STRIP-MCNC: NORMAL MG/DL (ref 0–2)
WBC #/AREA URNS AUTO: >182 /HPF (ref 0–5)
WBC CLUMPS #/AREA URNS HPF: PRESENT /HPF

## 2018-03-15 PROCEDURE — 25000132 ZZH RX MED GY IP 250 OP 250 PS 637: Performed by: EMERGENCY MEDICINE

## 2018-03-15 RX ORDER — CEPHALEXIN 500 MG/1
500 CAPSULE ORAL 3 TIMES DAILY
Qty: 15 CAPSULE | Refills: 0 | Status: SHIPPED | OUTPATIENT
Start: 2018-03-15 | End: 2018-03-17 | Stop reason: ALTCHOICE

## 2018-03-15 RX ORDER — CEPHALEXIN 500 MG/1
1000 CAPSULE ORAL ONCE
Status: COMPLETED | OUTPATIENT
Start: 2018-03-15 | End: 2018-03-15

## 2018-03-15 RX ADMIN — CEPHALEXIN 1000 MG: 500 CAPSULE ORAL at 00:32

## 2018-03-15 ASSESSMENT — ENCOUNTER SYMPTOMS
FEVER: 0
DIFFICULTY URINATING: 1

## 2018-03-15 NOTE — DISCHARGE INSTRUCTIONS
Cardona Catheter Care    A Cardona catheter is a rubber tube that is placed through the urethra (opening where urine comes out) and into the bladder. This helps drain urine from the bladder. There is a small balloon on the end of the tube that is inflated after insertion. This keeps the catheter from sliding out of the bladder.  A Cardona catheter is used to treat urinary retention (unable to pass urine). It is also used when there is incontinence (loss of bladder control).  Home care    Finish taking any prescribed antibiotic even if you are feeling better before then.    It is important to keep bacteria from getting into the collection bag. Do not disconnect the catheter from the collection bag.    Use a leg band to secure the drainage tube, so it does not pull on the catheter. Drain the collection bag when it becomes full using the drain spout at the bottom of the bag.    Do not try to pull or remove your catheter. This will injure your urethra. It must be removed by your healthcare provider or nurse.  Follow-up care  Follow up with your healthcare provider as advised for repeat urine testing and catheter removal or replacement.  When to seek medical advice  Call your healthcare provider right away if any of these occur:    Fever of 100.4 F (38 C) or higher, or as directed by your healthcare provider    Bladder pain or fullness    Abdominal swelling, nausea or vomiting, or back pain    Blood or urine leakage around the catheter    Bloody urine coming from the catheter (if a new symptom)    Catheter falls out    Catheter stops draining for 6 hours    Weakness, dizziness, or fainting  Date Last Reviewed: 10/1/2016    7978-9002 [employer]. [address] [place] [address], [place] [address]. All rights reserved. This information is not intended as a substitute for professional medical care. Always follow your healthcare professional's instructions.

## 2018-03-15 NOTE — ED PROVIDER NOTES
History     Chief Complaint:  Urinary Retention     HPI   Ron Gilmore is a 77-year-old male 3 weeks status post TURP who presents for evaluation of urinary retention.  The patient's catheter was taken out this afternoon after several months of catheter placement.  He states that he has not been able to void his bladder since this time.  He is following with Dr. Sullivan of urology.    Allergies:  No known drug allergies.      Medications:    Metoprolol  Allopurinol  Paxil  Flomax    Past Medical History:    BPH   Cholelithiasis   COPD   Depression   Diabetes   Gout   Hyperlipidemia   Hypertension   Kidney stones   Lumbago   Neuropathy   Obesity   Spinal stenosis   Stroke unsteady gait   Urinary retention     Past Surgical History:    Appendectomy  Cholecystectomy  TURP--2/21/2018  Right eyelid surgery  BETSEY, right  Bilateral knee surgery  Lumbar laminectomy  Tonsillectomy  Cataract surgery    Family History:    Prostate cancer--father    Social History:  Marital Status:    Presents to the ED with family   Tobacco Use: Former smoker   Alcohol Use: No   PCP: Augustin Thomas      Review of Systems   Constitutional: Negative for fever.   Genitourinary: Positive for difficulty urinating.   All other systems reviewed and are negative.    Physical Exam     Patient Vitals for the past 24 hrs:   BP Temp Temp src Pulse Resp SpO2 Height Weight   03/14/18 2247 120/75 98.4  F (36.9  C) Oral 119 18 92 % 1.829 m (6') 136.1 kg (300 lb)        Physical Exam   Constitutional: He appears well-developed.   Orbit obesity wheelchair bound   Cardiovascular: Normal rate.    Pulmonary/Chest: Effort normal.   Abdominal: Soft. He exhibits distension.   Nursing note and vitals reviewed.      Emergency Department Course   Laboratory:  Urine:  UA: Slightly cloudy yellow urine, moderate blood, RBC >182, large leukocyte esterase, WBC >182, positive nitrate, WBC clumps present, mucous present, albumin 30, otherwise WNL   Urine  Culture: Pending.      Interventions:  Keflex, 1000 mg, PO     Emergency Department Course:  Nursing notes and vitals reviewed.    (5627) I entered the room with my scribe, obtained the history, and performed an exam of the patient as documented above.    The patient had a Cardona catheter placed without difficulty. Urine sample was obtained and sent for laboratory analysis, findings above.     (0018) I personally reviewed the laboratory results with the patient and answered all related questions prior to discharge.      Findings and plan explained to the patient. Patient discharged home with instructions regarding supportive care, medications, and reasons to return. The importance of close follow-up was reviewed. The patient was prescribed Keflex.       Impression & Plan    Medical Decision Making:  Patient's bladder scan demonstrated greater than 500 cc of urine in the bladder.  Due to history of urine retention recent catheter removal catheter was replaced with complete resolution in symptoms there are greater than 100 white blood cells in the urine current culture is pending Keflex was initiated and patient was encouraged to follow-up with urology.    Diagnosis:    ICD-10-CM   1. Dysuria R30.0   2. Urinary retention R33.9       Disposition:  discharged to home    Discharge Medications:  New Prescriptions    CEPHALEXIN (KEFLEX) 500 MG CAPSULE    Take 1 capsule (500 mg) by mouth 3 times daily for 5 days         [facility] EMERGENCY DEPARTMENT      Scribe disclosure:  I, Charles Jaramillo, am serving as a scribe on 3/14/2018 at 11:27 PM to personally document services performed by Dr. Vázquez based on my observations and the provider's statements to me.        Jostin Vázquez MD  03/16/18 0214

## 2018-03-17 ENCOUNTER — TELEPHONE (OUTPATIENT)
Dept: EMERGENCY MEDICINE | Facility: CLINIC | Age: 78
End: 2018-03-17

## 2018-03-17 DIAGNOSIS — R30.0 DYSURIA: Primary | ICD-10-CM

## 2018-03-17 LAB
BACTERIA SPEC CULT: ABNORMAL
Lab: ABNORMAL
SPECIMEN SOURCE: ABNORMAL

## 2018-03-17 RX ORDER — CIPROFLOXACIN 500 MG/1
500 TABLET, FILM COATED ORAL 2 TIMES DAILY
Qty: 14 TABLET | Refills: 0 | Status: SHIPPED | OUTPATIENT
Start: 2018-03-17 | End: 2018-03-24

## 2018-03-17 NOTE — TELEPHONE ENCOUNTER
[facility] Emergency Department Lab result notification [Adult-Male]    [facility] ED lab result protocol used  Urine Culture    Reason for call  Notify of lab results, assess symptoms,  review ED providers recommendations/discharge instructions (if necessary) and advise per ED lab result f/u protocol    Lab Result (including Rx patient on, if applicable)  Final Urine Culture Report on 3/17/18  [facility] ED discharge antibiotic: Cephalexin (Keflex) 500 mg capsule, 1 capsule (500 mg) by mouth 3 times daily for 5 days  #1. Bacteria, >100,000 colonies/mLCitrobacter freundii,  is [RESISTANT] to ED discharge antibiotic.   Change in treatment as per [facility] ED Lab result protocol.    Information table from ED Provider visit on 3/14/18  ED diagnosis: Dysuria   ED provider Dr. Jostin Vázquez   Symptoms reported at ED visit (Chief complaint, HPI) Ron Gilmore is a 77-year-old male 3 weeks status post TURP who presents for evaluation of urinary retention.  The patient's catheter was taken out this afternoon after several months of catheter placement.  He states that he has not been able to void his bladder since this time.  He is following with Dr. Sullivan of urology.      ED providers Impression and Plan (applicable information) Patient's bladder scan demonstrated greater than 500 cc of urine in the bladder.  Due to history of urine retention recent catheter removal catheter was replaced with complete resolution in symptoms there are greater than 100 white blood cells in the urine current culture is pending Keflex was initiated and patient was encouraged to follow-up with urology.   Significant Medical hx, if applicable BPH, s/p TURP, HTN, kidney stones, DM, urinary retention   Coumadin/Warfarin [Yes or No] No   Creatinine Level (mg/dl) 0.92 (October 2017)   Creatinine clearance (ml/min), if applicable 132 (based on October 2017 creatinine level)   Allergies NKA   Weight, if applicable 300 #      RN Assessment  "(Patient s current Symptoms), include time called.  [Insert Left message here if message left]  Ron \"feeling fine\".  Catheter flowing well.  No questions or concerns.    RN Recommendations/Instructions per [facility] ED lab result protocol  Patient notified of lab result and treatment recommendations.  Rx for Cipro sent to [Pharmacy - [facility]].  RN reviewed information about stopping Keflex once Cipro obtained.    Please Contact your PCP clinic or return to the Emergency department if your:    Symptoms return.    Symptoms do not resolve after completing antibiotic.    Symptoms worsen or other concerning symptom's.    PCP follow-up Questions asked: YES       Shahla Lee RN    [facility] RN  Lung Nodule and ED Lab Results F/U RN  Epic pool (ED late result f/u RN) : P [id number]   # [phone]     Copy of Lab result   Order   Urine Culture [[id number]] (Order [id number])   Exam Information   Exam Date Exam Time Accession # Results    3/14/18 11:54 PM [id number]    Component Results   Component Collected Lab   Specimen Description 03/14/2018 11:54    Catheterized Urine   Special Requests 03/14/2018 11:54 PM 77   Specimen received in preservative   Culture Micro (Abnormal) 03/14/2018 11:54    >100,000 colonies/mL   Citrobacter freundii      Culture & Susceptibility   CITROBACTER FREUNDII   Antibiotic Interpretation Sensitivity Unit Method Status   AMPICILLIN Resistant  ug/mL ZION Final   AMPICILLIN/SULBACTAM Resistant  ug/mL ZION Final   CEFAZOLIN Resistant >=64 ug/mL ZION Final   Comment: Cefazolin ZION breakpoints are for the treatment of uncomplicated urinary tract   infections.  For the treatment of systemic infections, please contact the   laboratory for additional testing.   CEFEPIME Sensitive <=1 ug/mL ZION Final   CEFOXITIN Resistant >=64 ug/mL ZION Final   CEFTAZIDIME Sensitive <=1 ug/mL ZION Final   CEFTRIAXONE Sensitive <=1 ug/mL ZION Final   CIPROFLOXACIN Sensitive <=0.25 ug/mL ZION " Final   GENTAMICIN Sensitive <=1 ug/mL ZION Final   LEVOFLOXACIN Sensitive <=0.12 ug/mL ZION Final   NITROFURANTOIN Sensitive <=16 ug/mL ZION Final   Piperacillin/Tazo Sensitive <=4 ug/mL ZION Final   TOBRAMYCIN Sensitive <=1 ug/mL ZION Final   Trimethoprim/Sulfa Sensitive <=1/19 ug/mL ZION Final

## 2018-09-15 ENCOUNTER — HOSPITAL ENCOUNTER (EMERGENCY)
Facility: CLINIC | Age: 78
Discharge: HOME | End: 2018-09-15
Attending: EMERGENCY MEDICINE | Admitting: EMERGENCY MEDICINE
Payer: COMMERCIAL

## 2018-09-15 VITALS
HEIGHT: 72 IN | SYSTOLIC BLOOD PRESSURE: 108 MMHG | BODY MASS INDEX: 40.63 KG/M2 | DIASTOLIC BLOOD PRESSURE: 75 MMHG | OXYGEN SATURATION: 94 % | TEMPERATURE: 98 F | HEART RATE: 93 BPM | WEIGHT: 300 LBS | RESPIRATION RATE: 16 BRPM

## 2018-09-15 DIAGNOSIS — R33.9 URINARY RETENTION: ICD-10-CM

## 2018-09-15 DIAGNOSIS — Z98.890 HISTORY OF UROLOGIC SURGERY: ICD-10-CM

## 2018-09-15 DIAGNOSIS — N30.00 ACUTE CYSTITIS WITHOUT HEMATURIA: ICD-10-CM

## 2018-09-15 LAB
ALBUMIN UR-MCNC: 100 MG/DL
APPEARANCE UR: ABNORMAL
BACTERIA #/AREA URNS HPF: ABNORMAL /HPF
BILIRUB UR QL STRIP: NEGATIVE
COLOR UR AUTO: YELLOW
GLUCOSE UR STRIP-MCNC: NEGATIVE MG/DL
HGB UR QL STRIP: ABNORMAL
KETONES UR STRIP-MCNC: NEGATIVE MG/DL
LEUKOCYTE ESTERASE UR QL STRIP: ABNORMAL
MUCOUS THREADS #/AREA URNS LPF: PRESENT /LPF
NITRATE UR QL: NEGATIVE
PH UR STRIP: 6 PH (ref 5–7)
RBC #/AREA URNS AUTO: 162 /HPF (ref 0–2)
SOURCE: ABNORMAL
SP GR UR STRIP: >1.035 (ref 1–1.03)
UROBILINOGEN UR STRIP-MCNC: NORMAL MG/DL (ref 0–2)
WBC #/AREA URNS AUTO: >182 /HPF (ref 0–5)
WBC CLUMPS #/AREA URNS HPF: PRESENT /HPF

## 2018-09-15 PROCEDURE — 25000132 ZZH RX MED GY IP 250 OP 250 PS 637: Performed by: EMERGENCY MEDICINE

## 2018-09-15 PROCEDURE — 81001 URINALYSIS AUTO W/SCOPE: CPT | Performed by: EMERGENCY MEDICINE

## 2018-09-15 PROCEDURE — 87106 FUNGI IDENTIFICATION YEAST: CPT | Performed by: EMERGENCY MEDICINE

## 2018-09-15 PROCEDURE — 99283 EMERGENCY DEPT VISIT LOW MDM: CPT

## 2018-09-15 PROCEDURE — 87086 URINE CULTURE/COLONY COUNT: CPT | Performed by: EMERGENCY MEDICINE

## 2018-09-15 PROCEDURE — 87186 SC STD MICRODIL/AGAR DIL: CPT | Performed by: EMERGENCY MEDICINE

## 2018-09-15 PROCEDURE — 51702 INSERT TEMP BLADDER CATH: CPT

## 2018-09-15 PROCEDURE — 87088 URINE BACTERIA CULTURE: CPT | Performed by: EMERGENCY MEDICINE

## 2018-09-15 RX ORDER — SULFAMETHOXAZOLE/TRIMETHOPRIM 800-160 MG
1 TABLET ORAL ONCE
Status: COMPLETED | OUTPATIENT
Start: 2018-09-15 | End: 2018-09-15

## 2018-09-15 RX ORDER — SULFAMETHOXAZOLE/TRIMETHOPRIM 800-160 MG
1 TABLET ORAL 2 TIMES DAILY
Qty: 14 TABLET | Refills: 0 | Status: SHIPPED | OUTPATIENT
Start: 2018-09-15 | End: 2018-09-18

## 2018-09-15 RX ADMIN — SULFAMETHOXAZOLE AND TRIMETHOPRIM 1 TABLET: 800; 160 TABLET ORAL at 15:34

## 2018-09-15 NOTE — ED AVS SNAPSHOT
"  Emergency Department    [address] [facility] [address]    Phone:  [phone]    Fax:  [phone]                                       Ron Gilmore   MRN: [id number]    Department:   Emergency Department   Date of Visit:  9/15/2018           Patient Information     Date Of Birth          1940        Your diagnoses for this visit were:     Acute cystitis without hematuria     Urinary retention     History of urologic surgery        You were seen by Zane Denton MD.      Follow-up Information     Follow up with Rob Sullivan MD. Call in 2 days.    Specialty:  Urology    Contact information:    [facility]  [address] [address] [phone]          Follow up with  Emergency Department.    Specialty:  EMERGENCY MEDICINE    Why:  As needed, If symptoms worsen    Contact information:    [address] [employer] [address] [address] [phone]        Discharge Instructions          * BLADDER INFECTION: Male (Adult)    A bladder infection (\"cystitis\" or \"UTI\") usually causes a constant urge to urinate, and a burning when passing urine. Urine may be cloudy, smelly or dark. There may be also be pain in the lower abdomen.  Cystitis in males is not common. It may be caused by a partial blockage in the urinary system that keeps the bladder from emptying completely. This is most often related to an enlarged prostate gland.  HOME CARE:  1. Drink lots of fluids (at least 6-8 glasses a day). This will flush the bacteria out of your bladder. Cranberry juice has been shown to help clear out the bacteria.  2. Avoid sexual intercourse until your symptoms are gone.  3. A bladder infection is treated with antibiotics. You may also be given Pyridium (generic - phenazopyridine) to reduce burning with urination. This will cause urine to become a bright orange color, which can stain clothing.  FOLLOW UP with your doctor or this facility if " ALL symptoms have not cleared within five days. It is important to keep your follow up appointment to discuss with your doctor the need for further tests of the urinary tract.  GET PROMPT MEDICAL ATTENTION if any of the following occur:    Fever over 101  F (38.3  C)    No improvement by the third day of treatment    Increasing back or abdominal pain    Repeated vomiting; unable to keep medicine down    Weakness, dizziness or fainting    0987-4123 [employer]. [address] [place] [address], [place] [address]. All rights reserved. This information is not intended as a substitute for professional medical care. Always follow your healthcare professional's instructions.  This information has been modified by your health care provider with permission from the publisher.      Cardona Catheter Care    A Cardona catheter is a rubber tube that is placed through the urethra (opening where urine comes out) and into the bladder. This helps drain urine from the bladder. There is a small balloon on the end of the tube that is inflated after insertion. This keeps the catheter from sliding out of the bladder.  A Cardona catheter is used to treat urinary retention (unable to pass urine). It is also used when there is incontinence (loss of bladder control).  Home care    Finish taking any prescribed antibiotic even if you are feeling better before then.    It is important to keep bacteria from getting into the collection bag. Do not disconnect the catheter from the collection bag.    Use a leg band to secure the drainage tube, so it does not pull on the catheter. Drain the collection bag when it becomes full using the drain spout at the bottom of the bag.    Do not try to pull or remove your catheter. This will injure your urethra. It must be removed by your healthcare provider or nurse.  Follow-up care  Follow up with your healthcare provider as advised for repeat urine testing and catheter removal or replacement.  When to seek  medical advice  Call your healthcare provider right away if any of these occur:    Fever of 100.4 F (38 C) or higher, or as directed by your healthcare provider    Bladder pain or fullness    Abdominal swelling, nausea or vomiting, or back pain    Blood or urine leakage around the catheter    Bloody urine coming from the catheter (if a new symptom)    Catheter falls out    Catheter stops draining for 6 hours    Weakness, dizziness, or fainting  Date Last Reviewed: 10/1/2016    3343-6467 [employer]. [address] [place] [address]. All rights reserved. This information is not intended as a substitute for professional medical care. Always follow your healthcare professional's instructions.          24 Hour Appointment Hotline       To make an appointment at any [facility], call [phone] ([phone]). If you don't have a family doctor or clinic, we will help you find one. [place] clinics are conveniently located to serve the needs of you and your family.             Review of your medicines      START taking        Dose / Directions Last dose taken    sulfamethoxazole-trimethoprim 800-160 MG per tablet   Commonly known as:  BACTRIM DS   Dose:  1 tablet   Quantity:  14 tablet        Take 1 tablet by mouth 2 times daily for 7 days   Refills:  0          Our records show that you are taking the medicines listed below. If these are incorrect, please call your family doctor or clinic.        Dose / Directions Last dose taken    albuterol (2.5 MG/3ML) 0.083% neb solution   Dose:  2.5 mg   Quantity:  360 mL        Take 1 vial (2.5 mg) by nebulization every 2 hours as needed for shortness of breath / dyspnea or other (dyspnea)   Refills:  0        ALLOPURINOL PO   Dose:  300 mg        Take 300 mg by mouth daily   Refills:  0        AMLACTIN ULTRA EX        Externally apply topically daily APPLY TO FEET   Refills:  0        ASPIRIN PO   Dose:  81 mg        Take 81 mg by mouth daily    Refills:  0        FLOMAX 0.4 MG capsule   Dose:  0.4 mg   Generic drug:  tamsulosin        Take 0.4 mg by mouth 2 times daily   Refills:  0        fluocinonide-emollient 0.05 % cream   Commonly known as:  LIDEX-E        Apply topically 2 times daily as needed   Refills:  0        LOPRESSOR PO   Dose:  12.5 mg        Take 12.5 mg by mouth 2 times daily (0.5 x 25 mg tablet = 12.5 mg dose)   Refills:  0        order for DME   Quantity:  1 Device        Equipment being ordered: Other: Home nebulizer Treatment Diagnosis: COPD/Pneumonia   Refills:  0        PAXIL PO   Dose:  10 mg        Take 10 mg by mouth daily   Refills:  0                Prescriptions were sent or printed at these locations (1 Prescription)                   Other Prescriptions                Printed at Department/Unit printer (1 of 1)         sulfamethoxazole-trimethoprim (BACTRIM DS) 800-160 MG per tablet                Procedures and tests performed during your visit     UA with Microscopic    Urine Culture Aerobic Bacterial      Orders Needing Specimen Collection     None      Pending Results     Date and Time Order Name Status Description    9/15/2018 1214 Urine Culture Aerobic Bacterial In process             Pending Culture Results     Date and Time Order Name Status Description    9/15/2018 1214 Urine Culture Aerobic Bacterial In process             Pending Results Instructions     If you had any lab results that were not finalized at the time of your Discharge, you can call the ED Lab Result RN at [phone]. You will be contacted by this team for any positive Lab results or changes in treatment. The nurses are available 7 days a week from 10A to 6:30P.  You can leave a message 24 hours per day and they will return your call.        Test Results From Your Hospital Stay        9/15/2018 12:56 PM         9/15/2018  1:20 PM      Component Results     Component Value Ref Range & Units Status    Color Urine Yellow  Final    Appearance Urine  Cloudy  Final    Glucose Urine Negative NEG^Negative mg/dL Final    Bilirubin Urine Negative NEG^Negative Final    Ketones Urine Negative NEG^Negative mg/dL Final    Specific Gravity Urine >1.035 (H) 1.003 - 1.035 Final    Blood Urine Trace (A) NEG^Negative Final    pH Urine 6.0 5.0 - 7.0 pH Final    Protein Albumin Urine 100 (A) NEG^Negative mg/dL Final    Urobilinogen mg/dL Normal 0.0 - 2.0 mg/dL Final    Nitrite Urine Negative NEG^Negative Final    Leukocyte Esterase Urine Large (A) NEG^Negative Final    Source Catheterized Urine  Final    WBC Urine >182 (H) 0 - 5 /HPF Final    RBC Urine 162 (H) 0 - 2 /HPF Final    WBC Clumps Present (A) NEG^Negative /HPF Final    Bacteria Urine Many (A) NEG^Negative /HPF Final    Mucous Urine Present (A) NEG^Negative /LPF Final                Clinical Quality Measure: Blood Pressure Screening     Your blood pressure was checked while you were in the emergency department today. The last reading we obtained was  BP: 101/66 . Please read the guidelines below about what these numbers mean and what you should do about them.  If your systolic blood pressure (the top number) is less than 120 and your diastolic blood pressure (the bottom number) is less than 80, then your blood pressure is normal. There is nothing more that you need to do about it.  If your systolic blood pressure (the top number) is 120-139 or your diastolic blood pressure (the bottom number) is 80-89, your blood pressure may be higher than it should be. You should have your blood pressure rechecked within a year by a primary care provider.  If your systolic blood pressure (the top number) is 140 or greater or your diastolic blood pressure (the bottom number) is 90 or greater, you may have high blood pressure. High blood pressure is treatable, but if left untreated over time it can put you at risk for heart attack, stroke, or kidney failure. You should have your blood pressure rechecked by a primary care provider  "within the next 4 weeks.  If your provider in the emergency department today gave you specific instructions to follow-up with your doctor or provider even sooner than that, you should follow that instruction and not wait for up to 4 weeks for your follow-up visit.        Thank you for choosing [facility]       Thank you for choosing [facility] for your care. Our goal is always to provide you with excellent care. Hearing back from our patients is one way we can continue to improve our services. Please take a few minutes to complete the written survey that you may receive in the mail after you visit with us. Thank you!        copygram Information     copygram lets you send messages to your doctor, view your test results, renew your prescriptions, schedule appointments and more. To sign up, go to [email or web address] . Click on \"Log in\" on the left side of the screen, which will take you to the Welcome page. Then click on \"Sign up Now\" on the right side of the page.     You will be asked to enter the access code listed below, as well as some personal information. Please follow the directions to create your username and password.     Your access code is: [email or web address]  Expires: 2018  3:42 PM     Your access code will  in 90 days. If you need help or a new code, please call your [facility] clinic or [phone].        Care EveryWhere ID     This is your Care EveryWhere ID. This could be used by other organizations to access your [facility] medical records  [id number]        Equal Access to Services     MICHELLE MEIER AH: Hadii shukir carson Sonicole, waaxda lujames, qaybta kaalmaryan jarrett . So [facility] [phone].    ATENCIÓN: Si habla español, tiene a dover disposición servicios gratuitos de asistencia lingüística. Llame al [phone].    We comply with applicable federal civil rights laws and [place] laws. We do not discriminate on the basis of race, color, national " origin, age, disability, sex, sexual orientation, or gender identity.            After Visit Summary       This is your record. Keep this with you and show to your community pharmacist(s) and doctor(s) at your next visit.

## 2018-09-15 NOTE — ED PROVIDER NOTES
"  History     Chief Complaint:  \"I can't pee\"       HPI   Ron Gilmore is a 78 year old male with a history of urinary retention and prior prostate resection in February of this year by Dr. Sullivan who presents emergency department with his stepdaughter for evaluation of urinary retention.  Just recently he felt like he was unable to fully empty his bladder, and he has some discomfort when he does actually urinate.  No vomiting or fevers.  No upper abdominal pain.  No urethral discharge.  He has not had an indwelling catheter for several months, though has had one on a number of occasions in the past.  He feels that it is hard to have a good bowel movement because of the urinary retention.  No rectal bleeding.    Later in his ED course, he reported having been on antibiotics number of weeks ago, initially on Cipro and then later on amoxicillin based on culture results through [facility] family physicians.  He felt that these antibiotics helped and he was asymptomatic thereafter until a few days ago.    Allergies:      See EPIC    Medications:      sulfamethoxazole-trimethoprim (BACTRIM DS) 800-160 MG per tablet   albuterol (2.5 MG/3ML) 0.083% neb solution   ALLOPURINOL PO   ASPIRIN PO   Emollient (AMLACTIN ULTRA EX)   fluocinonide-emollient (LIDEX-E) 0.05 % cream   Metoprolol Tartrate (LOPRESSOR PO)   order for DME   PARoxetine HCl (PAXIL PO)   tamsulosin (FLOMAX) 0.4 MG 24 hr capsule       Past Medical History:    Past Medical History:   Diagnosis Date     BPH (benign prostatic hyperplasia)      Cataract      Cholelithiasis      COPD (chronic obstructive pulmonary disease) (H)      Depression      Diabetes mellitus      Dyshidrotic foot dermatitis      Edema      Gout      Hyperlipidemia      Hypertension      Kidney stones      Lumbago      Lumbar disc displacement without myelopathy      Muscle weakness      Neuropathy, diabetic (H)      Obesity      Spinal stenosis      Stroke (H)      Unsteady gait      " Urinary retention with incomplete bladder emptying      UTI (urinary tract infection)        Patient Active Problem List    Diagnosis Date Noted     Post-operative state 02/21/2018     Priority: Medium     Tibia/fibula fracture 06/23/2017     Priority: Medium     Closed tibia fracture 06/23/2017     Priority: Medium     Pneumonia 06/13/2017     Priority: Medium     Vasovagal episode 10/25/2015     Priority: Medium     SIRS (systemic inflammatory response syndrome) (H) 10/24/2015     Priority: Medium        Past Surgical History:    Past Surgical History:   Procedure Laterality Date     APPENDECTOMY OPEN       ARTHROSCOPY SHOULDER ROTATOR CUFF REPAIR       cataracts Bilateral      CHOLECYSTECTOMY       COLONOSCOPY       CYSTOSCOPY  10/19/2011    Procedure:CYSTOSCOPY; CYSTOSCOPY, BLADDER STONE REMOVAL; Surgeon:ROB SAWYER; Location: OR     CYSTOSCOPY, TRANSURETHRAL RESECTION (TUR) PROSTATE, COMBINED N/A 2/21/2018    Procedure: COMBINED CYSTOSCOPY, TRANSURETHRAL RESECTION (TUR) PROSTATE;  COMBINED CYSTOSCOPY, TRANSURETHRAL RESECTION (TUR) PROSTATE ;  Surgeon: Rob Sawyer MD;  Location:  OR     EYE SURGERY      right lid surgery      JOINT REPLACEMENT Right     HIP     KNEE SURGERY Bilateral      LAMINECTOMY LUMBAR ONE LEVEL       TONSILLECTOMY          Family History:    family history includes Prostate Cancer in his father.    Social History:   reports that he has quit smoking. He has never used smokeless tobacco. He reports that he does not drink alcohol or use illicit drugs.    PCP: Augustin Thomas     Review of Systems   All other systems reviewed and are negative.      Physical Exam     Patient Vitals for the past 24 hrs:   BP Temp Temp src Pulse Heart Rate Resp SpO2 Height Weight   09/15/18 1559 - - - - - 16 - - -   09/15/18 1551 - - - - - - 94 % - -   09/15/18 1550 108/75 - - - - - - - -   09/15/18 1059 101/66 98  F (36.7  C) Oral 93 91 14 92 % 1.829 m (6') 136.1 kg (300 lb)        Physical  Exam  General: nontoxic appearing male semi-recumbent in room 22, step-dtr at bedside  HENT: mucous membranes moist  CV: regular rate  Resp: normal effort  GI: abdomen soft and with mild suprapubic tenderness, protuberant abdomen, no guarding, no palpable masses  MSK:   Thoracic spine: nontender, no CVAT  Lumbar spine: nontender  Pelvis stable.  : circumcised penis, nontender external genitalia, no urethral discharge  Rectal: no fecal impaction, no abnormal mass or tenderness, no stool in rectal vault  Skin: appropriately warm and dry, no perineal rash  Neuro: alert, clear speech, oriented  Psych: normal mood and affect        Emergency Department Course     Laboratory:    Labs Ordered and Resulted from Time of ED Arrival Up to the Time of Departure from the ED   ROUTINE UA WITH MICROSCOPIC - Abnormal; Notable for the following:        Result Value    Specific Gravity Urine >1.035 (*)     Blood Urine Trace (*)     Protein Albumin Urine 100 (*)     Leukocyte Esterase Urine Large (*)     WBC Urine >182 (*)     RBC Urine 162 (*)     WBC Clumps Present (*)     Bacteria Urine Many (*)     Mucous Urine Present (*)     All other components within normal limits   URINE CULTURE AEROBIC BACTERIAL        Interventions:  Medications   sulfamethoxazole-trimethoprim (BACTRIM DS/SEPTRA DS) 800-160 MG per tablet 1 tablet (1 tablet Oral Given 9/15/18 1534)        Emergency Department Course:  Past medical records, nursing notes, and vitals reviewed.  I performed an exam of the patient and obtained history, as documented above.    I performed electronic chart review in [employer].    ED nurse placed a Cardona catheter, draining over 300 cc of urine from his bladder.    I spoke with the [facility], and extensive efforts were made to obtain recent urine culture results through [facility] family physicians, though unfortunately these efforts were unsuccessful on the Saturday afternoon.    I rechecked the patient. Findings and plan explained to  "the Patient and family. Patient was discharged.    Impression & Plan      Medical Decision Making:  This presentation can be explained by a combination of urinary retention and acute cystitis which is very likely bacterial so urine culture was sent.  The retention was addressed with placement of a Cardona catheter and this will remain indwelling until follow-up with his urologist Dr. Sullivan.  He is familiar with this process and the leg bag was provided.  He does not have new flank pain vomiting fevers or other symptoms to suggest a systemic infection or pyelonephritis so I do not think he needs IV antibiotics or hospitalization.  He agrees with this.  He is very comfortable being discharged.  I do not think he needs advanced imaging or serum laboratory studies.  His emergency department course was fairly prolonged as I felt it would be very beneficial to obtain his most recent urine culture results which were done in a different health system, though eventually when it became clear we would not obtain these on this Saturday afternoon, I chose Bactrim based on the EPPA UTI clinical guideline, explained the culture follow-up process to him, apologized for the delay while we were trying to optimize his antibiotic selection, and he was subsequently discharged.      Diagnosis:    ICD-10-CM    1. Acute cystitis without hematuria N30.00 Urine Culture Aerobic Bacterial   2. Urinary retention R33.9    3. History of urologic surgery Z98.890         Discharge Medications:  Discharge Medication List as of 9/15/2018  3:43 PM      START taking these medications    Details   sulfamethoxazole-trimethoprim (BACTRIM DS) 800-160 MG per tablet Take 1 tablet by mouth 2 times daily for 7 days, Disp-14 tablet, R-0, Local Print              This record was created at least in part using electronic voice recognition software, so please excuse any \"typos.\"      9/15/2018   Zane Denton, *        Zane Denton MD  09/16/18 " 8903

## 2018-09-15 NOTE — DISCHARGE INSTRUCTIONS
"   * BLADDER INFECTION: Male (Adult)    A bladder infection (\"cystitis\" or \"UTI\") usually causes a constant urge to urinate, and a burning when passing urine. Urine may be cloudy, smelly or dark. There may be also be pain in the lower abdomen.  Cystitis in males is not common. It may be caused by a partial blockage in the urinary system that keeps the bladder from emptying completely. This is most often related to an enlarged prostate gland.  HOME CARE:  1. Drink lots of fluids (at least 6-8 glasses a day). This will flush the bacteria out of your bladder. Cranberry juice has been shown to help clear out the bacteria.  2. Avoid sexual intercourse until your symptoms are gone.  3. A bladder infection is treated with antibiotics. You may also be given Pyridium (generic - phenazopyridine) to reduce burning with urination. This will cause urine to become a bright orange color, which can stain clothing.  FOLLOW UP with your doctor or this facility if ALL symptoms have not cleared within five days. It is important to keep your follow up appointment to discuss with your doctor the need for further tests of the urinary tract.  GET PROMPT MEDICAL ATTENTION if any of the following occur:    Fever over 101  F (38.3  C)    No improvement by the third day of treatment    Increasing back or abdominal pain    Repeated vomiting; unable to keep medicine down    Weakness, dizziness or fainting    8393-7314 [employer]. [address] [place] [address]. All rights reserved. This information is not intended as a substitute for professional medical care. Always follow your healthcare professional's instructions.  This information has been modified by your health care provider with permission from the publisher.      Cardona Catheter Care    A Cardona catheter is a rubber tube that is placed through the urethra (opening where urine comes out) and into the bladder. This helps drain urine from the bladder. There is a " small balloon on the end of the tube that is inflated after insertion. This keeps the catheter from sliding out of the bladder.  A Cardona catheter is used to treat urinary retention (unable to pass urine). It is also used when there is incontinence (loss of bladder control).  Home care    Finish taking any prescribed antibiotic even if you are feeling better before then.    It is important to keep bacteria from getting into the collection bag. Do not disconnect the catheter from the collection bag.    Use a leg band to secure the drainage tube, so it does not pull on the catheter. Drain the collection bag when it becomes full using the drain spout at the bottom of the bag.    Do not try to pull or remove your catheter. This will injure your urethra. It must be removed by your healthcare provider or nurse.  Follow-up care  Follow up with your healthcare provider as advised for repeat urine testing and catheter removal or replacement.  When to seek medical advice  Call your healthcare provider right away if any of these occur:    Fever of 100.4 F (38 C) or higher, or as directed by your healthcare provider    Bladder pain or fullness    Abdominal swelling, nausea or vomiting, or back pain    Blood or urine leakage around the catheter    Bloody urine coming from the catheter (if a new symptom)    Catheter falls out    Catheter stops draining for 6 hours    Weakness, dizziness, or fainting  Date Last Reviewed: 10/1/2016    0042-9720 [employer]. [address] [place] [address], [place] [address]. All rights reserved. This information is not intended as a substitute for professional medical care. Always follow your healthcare professional's instructions.

## 2018-09-15 NOTE — ED AVS SNAPSHOT
Emergency Department    [address] [facility] [address]    Phone:  [phone]    Fax:  [phone]                                       Ron Gilmore   MRN: [id number]    Department:   Emergency Department   Date of Visit:  9/15/2018           After Visit Summary Signature Page     I have received my discharge instructions, and my questions have been answered. I have discussed any challenges I see with this plan with the nurse or doctor.    ..........................................................................................................................................  Patient/Patient Representative Signature      ..........................................................................................................................................  Patient Representative Print Name and Relationship to Patient    ..................................................               ................................................  Date                                   Time    ..........................................................................................................................................  Reviewed by Signature/Title    ...................................................              ..............................................  Date                                               Time          22EPIC Rev 08/18

## 2018-09-18 ENCOUNTER — TELEPHONE (OUTPATIENT)
Dept: EMERGENCY MEDICINE | Facility: CLINIC | Age: 78
End: 2018-09-18

## 2018-09-18 DIAGNOSIS — N39.0 UTI (URINARY TRACT INFECTION): ICD-10-CM

## 2018-09-18 LAB
BACTERIA SPEC CULT: ABNORMAL
BACTERIA SPEC CULT: ABNORMAL
Lab: ABNORMAL
SPECIMEN SOURCE: ABNORMAL

## 2018-09-18 RX ORDER — LINEZOLID 600 MG/1
600 TABLET, FILM COATED ORAL 2 TIMES DAILY
Qty: 28 TABLET | Refills: 0 | Status: SHIPPED | OUTPATIENT
Start: 2018-09-18 | End: 2018-10-02

## 2018-09-18 NOTE — TELEPHONE ENCOUNTER
"[facility] Emergency Department Lab result notification [Adult-Male]    [facility] ED lab result protocol used  Urine culture    Reason for call  Notify of lab results, assess symptoms,  review ED providers recommendations/discharge instructions (if necessary) and advise per ED lab result f/u protocol    Lab Result (including Rx patient on, if applicable)  Final Urine Culture Report on 9/18/18  Emergency Dept discharge antibiotic prescribed: Sulfamethoxazole-Trimethoprim (Bactrim DS, Septra DS) 800-160 mg PO tablet,  1 tablet by mouth 2 times daily for 7 days.  #1. Bacteria, >100,000 colonies/mL Enterococcus faecium,  is [RESISTANT] to antibiotic.   Change in treatment as per [place] ED Lab result protocol.    Information table from ED Provider visit on 9/15/18  Symptoms reported at ED visit (Chief complaint, HPI) Chief Complaint:  \"I can't pee\"       HPI   Ron Gilmore is a 78 year old male with a history of urinary retention and prior prostate resection in February of this year by Dr. Sullivan who presents emergency department with his stepdaughter for evaluation of urinary retention.  Just recently he felt like he was unable to fully empty his bladder, and he has some discomfort when he does actually urinate.  No vomiting or fevers.  No upper abdominal pain.  No urethral discharge.  He has not had an indwelling catheter for several months, though has had one on a number of occasions in the past.  He feels that it is hard to have a good bowel movement because of the urinary retention.  No rectal bleeding.     Later in his ED course, he reported having been on antibiotics number of weeks ago, initially on Cipro and then later on amoxicillin based on culture results through [facility] family physicians.  He felt that these antibiotics helped and he was asymptomatic thereafter until a few days ago.   Significant Medical hx, if applicable (i.e. CKD, diabetes) diabetes   Allergies No Known Allergies " "  Weight, if applicable Wt Readings from Last 2 Encounters:   09/15/18 136.1 kg (300 lb)   03/14/18 136.1 kg (300 lb)      Coumadin/Warfarin [Yes /No] No   Creatinine Level (mg/dl) Creatinine   Date Value Ref Range Status   02/21/2018 0.78 0.66 - 1.25 mg/dL Final      Creatinine clearance (ml/min), if applicable Creatinine clearance cannot be calculated (Patient's most recent sCr result is older than the maximum 90 days allowed.)   ED providers Impression and Plan (applicable information) This presentation can be explained by a combination of urinary retention and acute cystitis which is very likely bacterial so urine culture was sent.  The retention was addressed with placement of a Cardona catheter and this will remain indwelling until follow-up with his urologist Dr. Sullivan.  He is familiar with this process and the leg bag was provided.  He does not have new flank pain vomiting fevers or other symptoms to suggest a systemic infection or pyelonephritis so I do not think he needs IV antibiotics or hospitalization.  He agrees with this.  He is very comfortable being discharged.  I do not think he needs advanced imaging or serum laboratory studies.  His emergency department course was fairly prolonged as I felt it would be very beneficial to obtain his most recent urine culture results which were done in a different health system, though eventually when it became clear we would not obtain these on this Saturday afternoon, I chose Bactrim based on the EPPA UTI clinical guideline, explained the culture follow-up process to him, apologized for the delay while we were trying to optimize his antibiotic selection, and he was subsequently discharged.   ED diagnosis Acute cystitis  Urinary retention  Hx of urologic surgery   ED provider Zane Denton MD      RN Assessment (Patient s current Symptoms), include time called.  [Insert Left message here if message left]  At 12:35P, per Ron's wife, \"he is feeling well. " " No complaints.\"  He has appt with Dr Sullivan next week.     RN Recommendations/Instructions per [facility] ED lab result protocol  Patient notified of lab result and treatment recommendations.  RN to consult with Emergency Dept provider and then call back.    [facility] Emergency Department Provider Name & Recommendations (included time consulted)  At 12:40P, Consulted with Dr Trierweiler, [facility] Emergency Dept provider, and he advised to switch him to Linezolid 600 mg PO BID for 14 days.  (Stop the Bactrim DS).  High risk warning via Epic when entering Linezolid Rx.  Interaction with Paxil.  Discussed interaction with Pharmacist at [facility] pharmacy and she recommeneded one option is to make sure 4-6 hours between Paxil dose and Linezolid.  Reviewed interaction with Dr Trierweiler and he advised to follow the pharmacist recommendation with 4-6 hours dosing between Paxil and Linezolid.  If Rx too expensive for Ron, then he would need to return to the Emergency Dept.    At 1:10P, Rx for Linezolid 600 mg PO BID for 14 days sent to [Pharmacy - [facility] pharmacy].  Ron will  the Rx today.       Please Contact your PCP clinic or return to the Emergency department if your:    Symptoms worsen or other concerning symptom's.    PCP follow-up Questions asked: YES       [RN Name]  Alejandro Snow RN  [facility] RN  Lung Nodule and ED Lab Result RN  Epic pool (ED late result f/u RN): [id number]  FV INCIDENTAL RADIOLOGY F/U NURSES: P [id number]  # [phone]      Copy of Lab result  Urine Culture Aerobic Bacterial [MVS131] (Order [id number])   Exam Information   Exam Date Exam Time Accession #   9/15/18 12:05 PM [id number]   Component Results   Component   Specimen Description   Collected: 09/15/2018 12:05 PM   Lab: 225   Catheterized Urine   Special Requests   Collected: 09/15/2018 12:05 PM   Lab: 75   Specimen received in preservative   Culture Micro (Abnormal)   Collected: 09/15/2018 12:05 PM   Lab: 225 "   >100,000 colonies/mL   Enterococcus faecium (VRE)      Culture Micro (Abnormal)   Collected: 09/15/2018 12:05 PM   Lab: 225   10,000 to 50,000 colonies/mL   Candida tropicalis   Susceptibility testing not routinely done      Culture & Susceptibility   ENTEROCOCCUS FAECIUM (VRE)   Antibiotic Interpretation Sensitivity Unit Method Status   AMPICILLIN Resistant >256.0 ug/mL ZION Final   LINEZOLID Sensitive 2 ug/mL ZION Final   NITROFURANTOIN Intermediate 64 ug/mL ZION Final   PENICILLIN Resistant >=64 ug/mL ZION Final   VANCOMYCIN Resistant >256.0 ug/mL ZION Final   Comment: VRE- Requires Contact Precautions

## 2018-10-12 ENCOUNTER — HOSPITAL ENCOUNTER (INPATIENT)
Facility: CLINIC | Age: 78
LOS: 5 days | Discharge: HOME HEALTH | DRG: 193 | End: 2018-10-17
Attending: EMERGENCY MEDICINE | Admitting: STUDENT IN AN ORGANIZED HEALTH CARE EDUCATION/TRAINING PROGRAM
Payer: COMMERCIAL

## 2018-10-12 ENCOUNTER — APPOINTMENT (OUTPATIENT)
Dept: GENERAL RADIOLOGY | Facility: CLINIC | Age: 78
DRG: 193 | End: 2018-10-12
Attending: EMERGENCY MEDICINE
Payer: COMMERCIAL

## 2018-10-12 DIAGNOSIS — J18.9 COMMUNITY ACQUIRED PNEUMONIA, UNSPECIFIED LATERALITY: Primary | ICD-10-CM

## 2018-10-12 DIAGNOSIS — R06.02 SHORTNESS OF BREATH: ICD-10-CM

## 2018-10-12 DIAGNOSIS — R13.10 DYSPHAGIA, UNSPECIFIED TYPE: ICD-10-CM

## 2018-10-12 DIAGNOSIS — R09.02 HYPOXIA: ICD-10-CM

## 2018-10-12 LAB
ALBUMIN SERPL-MCNC: 2.3 G/DL (ref 3.4–5)
ALBUMIN UR-MCNC: 30 MG/DL
ALP SERPL-CCNC: 57 U/L (ref 40–150)
ALT SERPL W P-5'-P-CCNC: 34 U/L (ref 0–70)
ANION GAP SERPL CALCULATED.3IONS-SCNC: 5 MMOL/L (ref 3–14)
APPEARANCE UR: ABNORMAL
AST SERPL W P-5'-P-CCNC: 22 U/L (ref 0–45)
BASOPHILS # BLD AUTO: 0 10E9/L (ref 0–0.2)
BASOPHILS NFR BLD AUTO: 0 %
BILIRUB SERPL-MCNC: 0.5 MG/DL (ref 0.2–1.3)
BILIRUB UR QL STRIP: NEGATIVE
BUN SERPL-MCNC: 18 MG/DL (ref 7–30)
CALCIUM SERPL-MCNC: 7.2 MG/DL (ref 8.5–10.1)
CHLORIDE SERPL-SCNC: 107 MMOL/L (ref 94–109)
CO2 SERPL-SCNC: 29 MMOL/L (ref 20–32)
COLOR UR AUTO: YELLOW
CREAT SERPL-MCNC: 0.74 MG/DL (ref 0.66–1.25)
DIFFERENTIAL METHOD BLD: ABNORMAL
EOSINOPHIL # BLD AUTO: 0.2 10E9/L (ref 0–0.7)
EOSINOPHIL NFR BLD AUTO: 2.3 %
ERYTHROCYTE [DISTWIDTH] IN BLOOD BY AUTOMATED COUNT: 15.4 % (ref 10–15)
GFR SERPL CREATININE-BSD FRML MDRD: >90 ML/MIN/1.7M2
GLUCOSE SERPL-MCNC: 127 MG/DL (ref 70–99)
GLUCOSE UR STRIP-MCNC: NEGATIVE MG/DL
HCT VFR BLD AUTO: 34.5 % (ref 40–53)
HGB BLD-MCNC: 10.9 G/DL (ref 13.3–17.7)
HGB UR QL STRIP: ABNORMAL
IMM GRANULOCYTES # BLD: 0.1 10E9/L (ref 0–0.4)
IMM GRANULOCYTES NFR BLD: 1.3 %
KETONES UR STRIP-MCNC: NEGATIVE MG/DL
LACTATE BLD-SCNC: 1.2 MMOL/L (ref 0.7–2)
LEUKOCYTE ESTERASE UR QL STRIP: ABNORMAL
LYMPHOCYTES # BLD AUTO: 1.1 10E9/L (ref 0.8–5.3)
LYMPHOCYTES NFR BLD AUTO: 14 %
MCH RBC QN AUTO: 29 PG (ref 26.5–33)
MCHC RBC AUTO-ENTMCNC: 31.6 G/DL (ref 31.5–36.5)
MCV RBC AUTO: 92 FL (ref 78–100)
MONOCYTES # BLD AUTO: 1 10E9/L (ref 0–1.3)
MONOCYTES NFR BLD AUTO: 12.4 %
MUCOUS THREADS #/AREA URNS LPF: PRESENT /LPF
NEUTROPHILS # BLD AUTO: 5.7 10E9/L (ref 1.6–8.3)
NEUTROPHILS NFR BLD AUTO: 70 %
NITRATE UR QL: NEGATIVE
NRBC # BLD AUTO: 0 10*3/UL
NRBC BLD AUTO-RTO: 0 /100
NT-PROBNP SERPL-MCNC: 601 PG/ML (ref 0–1800)
PH UR STRIP: 6 PH (ref 5–7)
PLATELET # BLD AUTO: 190 10E9/L (ref 150–450)
POTASSIUM SERPL-SCNC: 3.8 MMOL/L (ref 3.4–5.3)
PROT SERPL-MCNC: 6.1 G/DL (ref 6.8–8.8)
RBC # BLD AUTO: 3.76 10E12/L (ref 4.4–5.9)
RBC #/AREA URNS AUTO: 9 /HPF (ref 0–2)
SODIUM SERPL-SCNC: 141 MMOL/L (ref 133–144)
SOURCE: ABNORMAL
SP GR UR STRIP: 1.02 (ref 1–1.03)
SQUAMOUS #/AREA URNS AUTO: 5 /HPF (ref 0–1)
TROPONIN I SERPL-MCNC: <0.015 UG/L (ref 0–0.04)
UROBILINOGEN UR STRIP-MCNC: 2 MG/DL (ref 0–2)
WBC # BLD AUTO: 8.2 10E9/L (ref 4–11)
WBC #/AREA URNS AUTO: >182 /HPF (ref 0–5)
WBC CLUMPS #/AREA URNS HPF: PRESENT /HPF

## 2018-10-12 PROCEDURE — 87186 SC STD MICRODIL/AGAR DIL: CPT | Performed by: EMERGENCY MEDICINE

## 2018-10-12 PROCEDURE — 96375 TX/PRO/DX INJ NEW DRUG ADDON: CPT

## 2018-10-12 PROCEDURE — 87800 DETECT AGNT MULT DNA DIREC: CPT | Performed by: EMERGENCY MEDICINE

## 2018-10-12 PROCEDURE — 81001 URINALYSIS AUTO W/SCOPE: CPT | Performed by: EMERGENCY MEDICINE

## 2018-10-12 PROCEDURE — 99223 1ST HOSP IP/OBS HIGH 75: CPT | Mod: AI | Performed by: STUDENT IN AN ORGANIZED HEALTH CARE EDUCATION/TRAINING PROGRAM

## 2018-10-12 PROCEDURE — 96374 THER/PROPH/DIAG INJ IV PUSH: CPT

## 2018-10-12 PROCEDURE — 12000000 ZZH R&B MED SURG/OB

## 2018-10-12 PROCEDURE — 36415 COLL VENOUS BLD VENIPUNCTURE: CPT | Performed by: STUDENT IN AN ORGANIZED HEALTH CARE EDUCATION/TRAINING PROGRAM

## 2018-10-12 PROCEDURE — 94640 AIRWAY INHALATION TREATMENT: CPT

## 2018-10-12 PROCEDURE — 87040 BLOOD CULTURE FOR BACTERIA: CPT | Performed by: EMERGENCY MEDICINE

## 2018-10-12 PROCEDURE — 71046 X-RAY EXAM CHEST 2 VIEWS: CPT

## 2018-10-12 PROCEDURE — 85049 AUTOMATED PLATELET COUNT: CPT | Performed by: STUDENT IN AN ORGANIZED HEALTH CARE EDUCATION/TRAINING PROGRAM

## 2018-10-12 PROCEDURE — 80053 COMPREHEN METABOLIC PANEL: CPT | Performed by: EMERGENCY MEDICINE

## 2018-10-12 PROCEDURE — 25000125 ZZHC RX 250: Performed by: EMERGENCY MEDICINE

## 2018-10-12 PROCEDURE — 25000128 H RX IP 250 OP 636: Performed by: EMERGENCY MEDICINE

## 2018-10-12 PROCEDURE — 93005 ELECTROCARDIOGRAM TRACING: CPT

## 2018-10-12 PROCEDURE — 83605 ASSAY OF LACTIC ACID: CPT | Performed by: EMERGENCY MEDICINE

## 2018-10-12 PROCEDURE — 87086 URINE CULTURE/COLONY COUNT: CPT | Performed by: EMERGENCY MEDICINE

## 2018-10-12 PROCEDURE — 84484 ASSAY OF TROPONIN QUANT: CPT | Performed by: EMERGENCY MEDICINE

## 2018-10-12 PROCEDURE — 87077 CULTURE AEROBIC IDENTIFY: CPT | Performed by: EMERGENCY MEDICINE

## 2018-10-12 PROCEDURE — 83880 ASSAY OF NATRIURETIC PEPTIDE: CPT | Performed by: EMERGENCY MEDICINE

## 2018-10-12 PROCEDURE — 85025 COMPLETE CBC W/AUTO DIFF WBC: CPT | Performed by: EMERGENCY MEDICINE

## 2018-10-12 PROCEDURE — 99285 EMERGENCY DEPT VISIT HI MDM: CPT | Mod: 25

## 2018-10-12 PROCEDURE — 87088 URINE BACTERIA CULTURE: CPT | Performed by: EMERGENCY MEDICINE

## 2018-10-12 RX ORDER — PREDNISONE 20 MG/1
40 TABLET ORAL DAILY
Status: DISCONTINUED | OUTPATIENT
Start: 2018-10-13 | End: 2018-10-17 | Stop reason: HOSPADM

## 2018-10-12 RX ORDER — NALOXONE HYDROCHLORIDE 0.4 MG/ML
.1-.4 INJECTION, SOLUTION INTRAMUSCULAR; INTRAVENOUS; SUBCUTANEOUS
Status: DISCONTINUED | OUTPATIENT
Start: 2018-10-12 | End: 2018-10-17 | Stop reason: HOSPADM

## 2018-10-12 RX ORDER — ASPIRIN 81 MG/1
81 TABLET, CHEWABLE ORAL DAILY
Status: DISCONTINUED | OUTPATIENT
Start: 2018-10-13 | End: 2018-10-17 | Stop reason: HOSPADM

## 2018-10-12 RX ORDER — TAMSULOSIN HYDROCHLORIDE 0.4 MG/1
0.4 CAPSULE ORAL 2 TIMES DAILY
Status: DISCONTINUED | OUTPATIENT
Start: 2018-10-13 | End: 2018-10-17 | Stop reason: HOSPADM

## 2018-10-12 RX ORDER — CEFTRIAXONE 1 G/1
1 INJECTION, POWDER, FOR SOLUTION INTRAMUSCULAR; INTRAVENOUS EVERY 24 HOURS
Status: DISCONTINUED | OUTPATIENT
Start: 2018-10-13 | End: 2018-10-14

## 2018-10-12 RX ORDER — HEPARIN SODIUM 5000 [USP'U]/.5ML
5000 INJECTION, SOLUTION INTRAVENOUS; SUBCUTANEOUS EVERY 12 HOURS
Status: DISCONTINUED | OUTPATIENT
Start: 2018-10-13 | End: 2018-10-17 | Stop reason: HOSPADM

## 2018-10-12 RX ORDER — IPRATROPIUM BROMIDE AND ALBUTEROL SULFATE 2.5; .5 MG/3ML; MG/3ML
3 SOLUTION RESPIRATORY (INHALATION) EVERY 4 HOURS PRN
Status: DISCONTINUED | OUTPATIENT
Start: 2018-10-12 | End: 2018-10-17 | Stop reason: HOSPADM

## 2018-10-12 RX ORDER — PAROXETINE 10 MG/1
10 TABLET, FILM COATED ORAL DAILY
Status: DISCONTINUED | OUTPATIENT
Start: 2018-10-13 | End: 2018-10-17 | Stop reason: HOSPADM

## 2018-10-12 RX ORDER — IPRATROPIUM BROMIDE AND ALBUTEROL SULFATE 2.5; .5 MG/3ML; MG/3ML
3 SOLUTION RESPIRATORY (INHALATION) ONCE
Status: COMPLETED | OUTPATIENT
Start: 2018-10-12 | End: 2018-10-12

## 2018-10-12 RX ORDER — CEFTRIAXONE 2 G/1
2 INJECTION, POWDER, FOR SOLUTION INTRAMUSCULAR; INTRAVENOUS ONCE
Status: COMPLETED | OUTPATIENT
Start: 2018-10-12 | End: 2018-10-12

## 2018-10-12 RX ADMIN — AZITHROMYCIN MONOHYDRATE 500 MG: 500 INJECTION, POWDER, LYOPHILIZED, FOR SOLUTION INTRAVENOUS at 22:24

## 2018-10-12 RX ADMIN — SODIUM CHLORIDE, POTASSIUM CHLORIDE, SODIUM LACTATE AND CALCIUM CHLORIDE 1000 ML: 600; 310; 30; 20 INJECTION, SOLUTION INTRAVENOUS at 21:37

## 2018-10-12 RX ADMIN — CEFTRIAXONE SODIUM 2 G: 2 INJECTION, POWDER, FOR SOLUTION INTRAMUSCULAR; INTRAVENOUS at 22:23

## 2018-10-12 RX ADMIN — IPRATROPIUM BROMIDE AND ALBUTEROL SULFATE 3 ML: 2.5; .5 SOLUTION RESPIRATORY (INHALATION) at 21:19

## 2018-10-12 ASSESSMENT — ENCOUNTER SYMPTOMS
NAUSEA: 0
ABDOMINAL PAIN: 0
COUGH: 0
FEVER: 1
VOMITING: 0
CHILLS: 0
SHORTNESS OF BREATH: 1

## 2018-10-12 NOTE — IP AVS SNAPSHOT
MRN:[id number]                      After Visit Summary   10/12/2018    Ron Gilmore    MRN: [id number]           Thank you!     Thank you for choosing [facility] for your care. Our goal is always to provide you with excellent care. Hearing back from our patients is one way we can continue to improve our services. Please take a few minutes to complete the written survey that you may receive in the mail after you visit with us. Thank you!        Patient Information     Date Of Birth          1940        Designated Caregiver       Most Recent Value    Caregiver    Will someone help with your care after discharge? yes [pt has PCA who comes daily to assist with cares]    Name of designated caregiver PCA [privately hired]    Phone number of caregiver n/a    Caregiver address unknown [visits daily to pts house]      About your hospital stay     You were admitted on:  October 12, 2018 You last received care in the:  [place] [address] [facility]    You were discharged on:  October 17, 2018        Reason for your hospital stay       This is a 78 year old male admitted with a COPD exacerbation and bibasilar pneumonia.                  Who to Call     For medical emergencies, please call 911.  For non-urgent questions about your medical care, please call your primary care provider or clinic, [phone]          Attending Provider     Provider Specialty    Adriano Villasenor MD Emergency Medicine    Sandro Maradiaga MD Internal Medicine       Primary Care Provider Office Phone # Fax #    Augustni Thomas [phone]      After Care Instructions     Activity       Your activity upon discharge: activity as tolerated            Diet       Follow this diet upon discharge: Orders Placed This Encounter      Room Service      Combination Diet Dysphagia Diet Level 3: Advanced; Honey Thickened Liquids (pre-thickened or use instant food thickener)                  Follow-up Appointments      Follow-up and recommended labs and tests        Follow up with primary care provider, Augustin Thomas, within 7 days for hospital follow- up.  No follow up labs or test are needed.          -You have an appointment with [facility]. [facility] at [facility] on Tuesday, October 23, 2018 at 11:00 am.                  Your next 10 appointments already scheduled     Oct 17, 2018 12:00 PM CDT   Inpatient Meal Follow Up Therapy with Saritha Castro, CHETNA   [facility] Speech Therapy ([facility])    [address]  [facility] [address] [phone]            Oct 18, 2018  1:00 AM CDT   Inpatient Meal Follow Up Therapy with CHETNA Bird   [facility] Therapy ([facility])    [address]  [facility] [address]   [phone]              Additional Services     Home Care SLP Referral for Hospital Discharge       SLP to eval and treat    Your provider has ordered home care - speech therapy. If you have not been contacted within 2 days of your discharge please call the department phone number listed on the top of this document.            Home care nursing referral       RN skilled nursing visit. RN to assess respiratory and cardiac status and hydration, nutrition and bowel status.    Your provider has ordered home care nursing services. If you have not been contacted within 2 days of your discharge please call the inpatient department phone number at [phone] .                  Pending Results     Date and Time Order Name Status Description    10/12/2018 2049 Blood culture Preliminary             Statement of Approval     Ordered          10/17/18 1009  I have reviewed and agree with all the recommendations and orders detailed in this document.  EFFECTIVE NOW     Approved and electronically signed by:  Hitesh Davies MD             Admission Information     Date & Time Provider Department Dept. Phone    10/12/2018 Ashwini  "MD [facility] [place] [address] [facility] [phone]      Your Vitals Were     Blood Pressure Pulse Temperature Respirations Height Weight    120/80 (BP Location: Left arm) 70 97  F (36.1  C) (Oral) 16 1.829 m (6') 126.7 kg (279 lb 4.8 oz)    Pulse Oximetry BMI (Body Mass Index)                91% 37.88 kg/m2          [employer] Information     [employer] lets you send messages to your doctor, view your test results, renew your prescriptions, schedule appointments and more. To sign up, go to [email or web address] . Click on \"Log in\" on the left side of the screen, which will take you to the Welcome page. Then click on \"Sign up Now\" on the right side of the page.     You will be asked to enter the access code listed below, as well as some personal information. Please follow the directions to create your username and password.     Your access code is: [email or web address]  Expires: 2018  3:42 PM     Your access code will  in 90 days. If you need help or a new code, please call your [facility] clinic or [phone].        Care EveryWhere ID     This is your Care EveryWhere ID. This could be used by other organizations to access your [facility] medical records  [id number]        Equal Access to Services     MICHELLE MEIER : Hadii aad ku hadasho Solakshmiali, waaxda luqadaha, qaybta kaalmada betsy, ryan bowman. So [facility] [phone].    ATENCIÓN: Si habla español, tiene a dover disposición servicios gratuitos de asistencia lingüística. Jonathon al [phone].    We comply with applicable federal civil rights laws and [place] laws. We do not discriminate on the basis of race, color, national origin, age, disability, sex, sexual orientation, or gender identity.               Review of your medicines      START taking        Dose / Directions    Cefixime 400 MG Caps capsule   Commonly known as:  SUPRAX   Indication:  Complicated Urinary Tract Infection   Used for:  Community acquired pneumonia, " unspecified laterality        Dose:  400 mg   Start taking on:  10/18/2018   Take 1 capsule (400 mg) by mouth daily for 14 days   Quantity:  14 capsule   Refills:  0         CONTINUE these medicines which have NOT CHANGED        Dose / Directions    albuterol (2.5 MG/3ML) 0.083% neb solution   Used for:  COPD exacerbation (H)        Dose:  2.5 mg   Take 1 vial (2.5 mg) by nebulization every 2 hours as needed for shortness of breath / dyspnea or other (dyspnea)   Quantity:  360 mL   Refills:  0       ALLOPURINOL PO        Dose:  300 mg   Take 300 mg by mouth daily   Refills:  0       AMLACTIN ULTRA EX        Externally apply topically daily APPLY TO FEET   Refills:  0       ASPIRIN PO        Dose:  81 mg   Take 81 mg by mouth daily   Refills:  0       FLOMAX 0.4 MG capsule   Generic drug:  tamsulosin        Dose:  0.4 mg   Take 0.4 mg by mouth 2 times daily   Refills:  0       fluocinonide-emollient 0.05 % cream   Commonly known as:  LIDEX-E        Apply topically 2 times daily as needed   Refills:  0       LOPRESSOR PO        Dose:  12.5 mg   Take 12.5 mg by mouth 2 times daily (0.5 x 25 mg tablet = 12.5 mg dose)   Refills:  0       order for DME   Used for:  COPD exacerbation (H)        Equipment being ordered: Other: Home nebulizer Treatment Diagnosis: COPD/Pneumonia   Quantity:  1 Device   Refills:  0       PAXIL PO        Dose:  10 mg   Take 10 mg by mouth daily   Refills:  0            Where to get your medicines      These medications were sent to [facility] - [address]  [address] [facility] [address], [place] [address]     Phone:  [phone]     Cefixime 400 MG Caps capsule                Protect others around you: Learn how to safely use, store and throw away your medicines at www.disposemymeds.org.        ANTIBIOTIC INSTRUCTION     You've Been Prescribed an Antibiotic - Now What?  Your healthcare team thinks that you or your loved one might have an infection. Some infections  can be treated with antibiotics, which are powerful, life-saving drugs. Like all medications, antibiotics have side effects and should only be used when necessary. There are some important things you should know about your antibiotic treatment.      Your healthcare team may run tests before you start taking an antibiotic.    Your team may take samples (e.g., from your blood, urine or other areas) to run tests to look for bacteria. These test can be important to determine if you need an antibiotic at all and, if you do, which antibiotic will work best.      Within a few days, your healthcare team might change or even stop your antibiotic.    Your team may start you on an antibiotic while they are working to find out what is making you sick.    Your team might change your antibiotic because test results show that a different antibiotic would be better to treat your infection.    In some cases, once your team has more information, they learn that you do not need an antibiotic at all. They may find out that you don't have an infection, or that the antibiotic you're taking won't work against your infection. For example, an infection caused by a virus can't be treated with antibiotics. Staying on an antibiotic when you don't need it is more likely to be harmful than helpful.      You may experience side effects from your antibiotic.    Like all medications, antibiotics have side effects. Some of these can be serious.    Let you healthcare team know if you have any known allergies when you are admitted to the hospital.    One significant side effect of nearly all antibiotics is the risk of severe and sometimes deadly diarrhea caused by Clostridium difficile (C. Difficile). This occurs when a person takes antibiotics because some good germs are destroyed. Antibiotic use allows C. diificile to take over, putting patients at high risk for this serious infection.    As a patient or caregiver, it is important to understand your  or your loved one's antibiotic treatment. It is especially important for caregivers to speak up when patients can't speak for themselves. Here are some important questions to ask your healthcare team.    What infection is this antibiotic treating and how do you know I have that infection?    What side effects might occur from this antibiotic?    How long will I need to take this antibiotic?    Is it safe to take this antibiotic with other medications or supplements (e.g., vitamins) that I am taking?     Are there any special directions I need to know about taking this antibiotic? For example, should I take it with food?    How will I be monitored to know whether my infection is responding to the antibiotic?    What tests may help to make sure the right antibiotic is prescribed for me?      Information provided by:  www.cdc.gov/getsmart  U.S. Department of Health and Human Services  Centers for disease Control and Prevention  National Center for Emerging and Zoonotic Infectious Diseases  Division of Healthcare Quality Promotion             Medication List: This is a list of all your medications and when to take them. Check marks below indicate your daily home schedule. Keep this list as a reference.      Medications           Morning Afternoon Evening Bedtime As Needed    albuterol (2.5 MG/3ML) 0.083% neb solution   Take 1 vial (2.5 mg) by nebulization every 2 hours as needed for shortness of breath / dyspnea or other (dyspnea)                                ALLOPURINOL PO   Take 300 mg by mouth daily   Next Dose Due:  Begin pre-hospitalization regimen                                  AMLACTIN ULTRA EX   Externally apply topically daily APPLY TO FEET   Last time this was given:  Begin pre-hospitalization regimen                                  ASPIRIN PO   Take 81 mg by mouth daily   Last time this was given:  81 mg on 10/17/2018  8:26 AM            Take 81mg starting tomorrow 10/18, in the morning at 8am                        Cefixime 400 MG Caps capsule   Commonly known as:  SUPRAX   Take 1 capsule (400 mg) by mouth daily for 14 days   Start taking on:  10/18/2018   Last time this was given:  400 mg on 10/17/2018  8:25 AM            Take 1 capsule, starting tomorrow, 10/18, at 8am                       FLOMAX 0.4 MG capsule   Take 0.4 mg by mouth 2 times daily   Last time this was given:  0.4 mg on 10/17/2018  8:26 AM   Generic drug:  tamsulosin                    Take 1 capsule, tonight, 10/17 at 9pm                fluocinonide-emollient 0.05 % cream   Commonly known as:  LIDEX-E   Apply topically 2 times daily as needed   Last time this was given:  Begin pre-hospitalization regimen                                LOPRESSOR PO   Take 12.5 mg by mouth 2 times daily (0.5 x 25 mg tablet = 12.5 mg dose)                    Take 12.5mg tonight, 10/17, at 9pm               order for DME   Equipment being ordered: Other: Home nebulizer Treatment Diagnosis: COPD/Pneumonia                                PAXIL PO   Take 10 mg by mouth daily   Last time this was given:  10 mg on 10/17/2018  8:26 AM            Take 10mg starting tomorrow, 10/18

## 2018-10-12 NOTE — IP AVS SNAPSHOT
[address]    [place] [address]    Phone:  [phone]                                       After Visit Summary   10/12/2018    Ron Gilmore    MRN: [id number]           After Visit Summary Signature Page     I have received my discharge instructions, and my questions have been answered. I have discussed any challenges I see with this plan with the nurse or doctor.    ..........................................................................................................................................  Patient/Patient Representative Signature      ..........................................................................................................................................  Patient Representative Print Name and Relationship to Patient    ..................................................               ................................................  Date                                   Time    ..........................................................................................................................................  Reviewed by Signature/Title    ...................................................              ..............................................  Date                                               Time          22EPIC Rev 08/18

## 2018-10-13 LAB
ANION GAP SERPL CALCULATED.3IONS-SCNC: 2 MMOL/L (ref 3–14)
BUN SERPL-MCNC: 18 MG/DL (ref 7–30)
CALCIUM SERPL-MCNC: 7.8 MG/DL (ref 8.5–10.1)
CHLORIDE SERPL-SCNC: 107 MMOL/L (ref 94–109)
CO2 SERPL-SCNC: 31 MMOL/L (ref 20–32)
CREAT SERPL-MCNC: 0.83 MG/DL (ref 0.66–1.25)
ERYTHROCYTE [DISTWIDTH] IN BLOOD BY AUTOMATED COUNT: 15.5 % (ref 10–15)
GFR SERPL CREATININE-BSD FRML MDRD: >90 ML/MIN/1.7M2
GLUCOSE SERPL-MCNC: 104 MG/DL (ref 70–99)
HCT VFR BLD AUTO: 34.9 % (ref 40–53)
HGB BLD-MCNC: 11 G/DL (ref 13.3–17.7)
INTERPRETATION ECG - MUSE: NORMAL
MCH RBC QN AUTO: 29.1 PG (ref 26.5–33)
MCHC RBC AUTO-ENTMCNC: 31.5 G/DL (ref 31.5–36.5)
MCV RBC AUTO: 92 FL (ref 78–100)
PLATELET # BLD AUTO: 181 10E9/L (ref 150–450)
PLATELET # BLD AUTO: 181 10E9/L (ref 150–450)
POTASSIUM SERPL-SCNC: 4.3 MMOL/L (ref 3.4–5.3)
RBC # BLD AUTO: 3.78 10E12/L (ref 4.4–5.9)
SODIUM SERPL-SCNC: 140 MMOL/L (ref 133–144)
WBC # BLD AUTO: 7.6 10E9/L (ref 4–11)

## 2018-10-13 PROCEDURE — 99232 SBSQ HOSP IP/OBS MODERATE 35: CPT | Performed by: STUDENT IN AN ORGANIZED HEALTH CARE EDUCATION/TRAINING PROGRAM

## 2018-10-13 PROCEDURE — 25000128 H RX IP 250 OP 636: Performed by: STUDENT IN AN ORGANIZED HEALTH CARE EDUCATION/TRAINING PROGRAM

## 2018-10-13 PROCEDURE — 36415 COLL VENOUS BLD VENIPUNCTURE: CPT | Performed by: STUDENT IN AN ORGANIZED HEALTH CARE EDUCATION/TRAINING PROGRAM

## 2018-10-13 PROCEDURE — 80048 BASIC METABOLIC PNL TOTAL CA: CPT | Performed by: STUDENT IN AN ORGANIZED HEALTH CARE EDUCATION/TRAINING PROGRAM

## 2018-10-13 PROCEDURE — 25000125 ZZHC RX 250: Performed by: STUDENT IN AN ORGANIZED HEALTH CARE EDUCATION/TRAINING PROGRAM

## 2018-10-13 PROCEDURE — 85027 COMPLETE CBC AUTOMATED: CPT | Performed by: STUDENT IN AN ORGANIZED HEALTH CARE EDUCATION/TRAINING PROGRAM

## 2018-10-13 PROCEDURE — 25000132 ZZH RX MED GY IP 250 OP 250 PS 637: Performed by: STUDENT IN AN ORGANIZED HEALTH CARE EDUCATION/TRAINING PROGRAM

## 2018-10-13 PROCEDURE — 12000000 ZZH R&B MED SURG/OB

## 2018-10-13 RX ADMIN — TAMSULOSIN HYDROCHLORIDE 0.4 MG: 0.4 CAPSULE ORAL at 09:14

## 2018-10-13 RX ADMIN — PAROXETINE HYDROCHLORIDE 10 MG: 10 TABLET, FILM COATED ORAL at 09:14

## 2018-10-13 RX ADMIN — GUAIFENESIN 200 MG: 200 SOLUTION ORAL at 10:04

## 2018-10-13 RX ADMIN — PREDNISONE 40 MG: 20 TABLET ORAL at 09:12

## 2018-10-13 RX ADMIN — Medication 12.5 MG: at 09:15

## 2018-10-13 RX ADMIN — Medication 12.5 MG: at 20:29

## 2018-10-13 RX ADMIN — CEFTRIAXONE 1 G: 1 INJECTION, POWDER, FOR SOLUTION INTRAMUSCULAR; INTRAVENOUS at 20:27

## 2018-10-13 RX ADMIN — HEPARIN SODIUM 5000 UNITS: 5000 INJECTION, SOLUTION INTRAVENOUS; SUBCUTANEOUS at 20:27

## 2018-10-13 RX ADMIN — AZITHROMYCIN MONOHYDRATE 500 MG: 500 INJECTION, POWDER, LYOPHILIZED, FOR SOLUTION INTRAVENOUS at 21:34

## 2018-10-13 RX ADMIN — HEPARIN SODIUM 5000 UNITS: 5000 INJECTION, SOLUTION INTRAVENOUS; SUBCUTANEOUS at 09:15

## 2018-10-13 RX ADMIN — TAMSULOSIN HYDROCHLORIDE 0.4 MG: 0.4 CAPSULE ORAL at 20:27

## 2018-10-13 RX ADMIN — ASPIRIN 81 MG 81 MG: 81 TABLET ORAL at 09:14

## 2018-10-13 ASSESSMENT — ACTIVITIES OF DAILY LIVING (ADL)
ADLS_ACUITY_SCORE: 26
ADLS_ACUITY_SCORE: 16
ADLS_ACUITY_SCORE: 26
ADLS_ACUITY_SCORE: 24
ADLS_ACUITY_SCORE: 26
ADLS_ACUITY_SCORE: 24

## 2018-10-13 NOTE — PLAN OF CARE
Problem: Patient Care Overview  Goal: Plan of Care/Patient Progress Review  OT Plan:Orders received, chart reviewed. Pt. admitted w/ SOB. Attempted evaluation, pt. sleepy, difficulty keeping eyes open, wanting to rest. Will reschedule.

## 2018-10-13 NOTE — PHARMACY-ADMISSION MEDICATION HISTORY
Admission medication history interview status for the 10/12/2018  admission is complete. See EPIC admission navigator for prior to admission medications     Medication history source reliability:Good, interview with pt and family member, pt list    Actions taken by pharmacist (provider contacted, etc):modified albuterol to prn     Additional medication history information not noted on PTA med list :None    Medication reconciliation/reorder completed by provider prior to medication history? No    Time spent in this activity: 10 minutes    Prior to Admission medications    Medication Sig Last Dose Taking? Auth Provider   albuterol (2.5 MG/3ML) 0.083% neb solution Take 1 vial (2.5 mg) by nebulization every 2 hours as needed for shortness of breath / dyspnea or other (dyspnea) prn Yes Wali Ibrahim MD   ALLOPURINOL PO Take 300 mg by mouth daily 10/12/2018 at am Yes Unknown, Entered By History   ASPIRIN PO Take 81 mg by mouth daily 10/12/2018 at am Yes Reported, Patient   Emollient (AMLACTIN ULTRA EX) Externally apply topically daily APPLY TO FEET  Past Week at Unknown time Yes Reported, Patient   fluocinonide-emollient (LIDEX-E) 0.05 % cream Apply topically 2 times daily as needed prn Yes Reported, Patient   Metoprolol Tartrate (LOPRESSOR PO) Take 12.5 mg by mouth 2 times daily (0.5 x 25 mg tablet = 12.5 mg dose) 10/12/2018 at am Yes Reported, Patient   PARoxetine HCl (PAXIL PO) Take 10 mg by mouth daily 10/12/2018 at am Yes Unknown, Entered By History   tamsulosin (FLOMAX) 0.4 MG 24 hr capsule Take 0.4 mg by mouth 2 times daily  10/12/2018 at am Yes Unknown, Entered By History

## 2018-10-13 NOTE — ED NOTES
[facility]  ED Nurse Handoff Report    ED Chief complaint: Shortness of Breath      ED Diagnosis:   Final diagnoses:   Hypoxia   Shortness of breath   Community acquired pneumonia, unspecified laterality       Code Status: see admitting MD    Allergies: No Known Allergies    Activity level - Baseline/Home:  wheel chair    Activity Level - Current:   wheel chair     Needed?: No    Isolation: No  Infection: Not Applicable  Bariatric?: No    Vital Signs:   Vitals:    10/12/18 2015 10/12/18 2016   BP: 110/69 110/69   Temp:  99.9  F (37.7  C)   TempSrc:  Oral   SpO2:  94%   Weight:  136.1 kg (300 lb)   Height:  1.829 m (6')       Cardiac Rhythm: ,        Pain level: 0-10 Pain Scale: 0    Is this patient confused?: No   [place] - Suicide Severity Rating Scale Completed?  Yes  If yes, what color did the patient score?  White    Patient Report: Initial Complaint: pt arrived via ambulance with SOB. Pts O2 sat was 87 on room air. Pt is currently on 2l/min via NC. Pt also had a urinary catheter in that was placed a month ago. Catheter was removed by this author.  Focused Assessment: see above  Tests Performed: UA, Labs, xray, EKG  Abnormal Results:   Labs Ordered and Resulted from Time of ED Arrival Up to the Time of Departure from the ED   CBC WITH PLATELETS DIFFERENTIAL - Abnormal; Notable for the following:        Result Value    RBC Count 3.76 (*)     Hemoglobin 10.9 (*)     Hematocrit 34.5 (*)     RDW 15.4 (*)     All other components within normal limits   COMPREHENSIVE METABOLIC PANEL - Abnormal; Notable for the following:     Glucose 127 (*)     Calcium 7.2 (*)     Albumin 2.3 (*)     Protein Total 6.1 (*)     All other components within normal limits   ROUTINE UA WITH MICROSCOPIC - Abnormal; Notable for the following:     Blood Urine Small (*)     Protein Albumin Urine 30 (*)     Leukocyte Esterase Urine Large (*)     WBC Urine >182 (*)     RBC Urine 9 (*)     WBC Clumps Present (*)      Squamous Epithelial /HPF Urine 5 (*)     Mucous Urine Present (*)     All other components within normal limits   NT PROBNP INPATIENT   LACTIC ACID WHOLE BLOOD   TROPONIN I   PULSE OXIMETRY NURSING   CARDIAC CONTINUOUS MONITORING   MEASURE URINE OUTPUT   PATIENT CARE ORDER   NURSING DRAW AND HOLD   BLOOD CULTURE   BLOOD CULTURE   URINE CULTURE AEROBIC BACTERIAL     Treatments provided: rocephen 2g, zithromax 500 mg, 1L LR, NEB tx, O2    Family Comments: step daughter just went home and she will be the one to pick him up when he is ready for D/C    OBS brochure/video discussed/provided to patient/family: N/A              Name of person given brochure if not patient: na              Relationship to patient: na    ED Medications:   Medications   cefTRIAXone (ROCEPHIN) 2 g vial to attach to  ml bag for ADULTS or NS 50 ml bag for PEDS (2 g Intravenous New Bag 10/12/18 2223)   azithromycin (ZITHROMAX) 500 mg in sodium chloride 0.9 % 250 mL intermittent infusion (500 mg Intravenous New Bag 10/12/18 2224)   lactated ringers BOLUS 1,000 mL (1,000 mLs Intravenous New Bag 10/12/18 2137)   ipratropium - albuterol 0.5 mg/2.5 mg/3 mL (DUONEB) neb solution 3 mL (3 mLs Nebulization Given 10/12/18 2119)       Drips infusing?:  Yes    For the majority of the shift this patient was Green.   Interventions performed were comfort and encouragement.    Severe Sepsis OR Septic Shock Diagnosis Present: No    To be done/followed up on inpatient unit:  nothing at this time    ED NURSE PHONE NUMBER: [phone]

## 2018-10-13 NOTE — ED NOTES
Bed: ED09  Expected date: 10/12/18  Expected time: 7:57 PM  Means of arrival:   Comments:  [facility] 76M  Weakness

## 2018-10-13 NOTE — PROGRESS NOTES
Hospitalist Progress Note    Date of Service: 10/13/2018         Assessment and Plan:       Ron Gilmore is a 78 year old male who was admitted to [facility] on 10/12/18 for further treatment of shortness of breath.    Acute hypoxic and hypercapnic respiratory failure  Community-acquired pneumonia  Chronic obstructive pulmonary disease  Assessment: presenting with two day hx of SOB following carpet at his residence being cleaned. CXR showed patchy bilateral perihilar and basilar opacities. Likely has CAP. No wheezing appreciated. Likely has component of COPD exacerbation as well given SOB occurred after carpet cleaned. Was hypoxic with sats in 80s in ED prior to supplemental O2. Troponin wnl, no chest pain present thus doubt ACS, Pro BNP wnl. WBC wnl. Patient clinically improving with O2 and antibiotics. Do have some concern of aspiration, will have SLP evaluate.  Plan:  - IV Ceftriaxone and Azithromycin  - Supplemental O2 as needed, goal > 88%, wean as tolerates  - Follow vitals/temp  - PT/OT/SW  - Duonebs as needed  - Prednisone 40 mg daily, 5 day burst  - SLP consult     Suspected sleep apnea  Assessment: Noted in past admissions that he had an apneic spell.  It was recommended that he have an outpatient sleep study  Plan:   -  continue to recommend sleep study at discharge     Type 2 diabetes  Assessment: diet controlled. Has not had a hemoglobin A1c checked since 2006.   Plan:  - noted     Urinary Retention  Benign prostatic hypertrophy  Assessment: Chirinos catheter in place and PTA on Tamsulosin. UA showed Large LETs and > 182 WBC. Doubt this is true UTI given his chronic chirinos and patient is already on ceftriaxone  Plan:  - Continue prior to admission tamsulosin.      Depression  Assessment/Plan: Continue prior to admission paroxetine.      Morbid obesity  Assessment: encouraged weight loss. BMI 40.69      Active Diet Order      Combination Diet Regular Diet Adult    DVT Prophylaxis:  Pneumatic Compression Devices  Code Status: Full Code    Disposition: Expected discharge in 1-2 days    Attestation:   I have reviewed today's relevant vital signs, notes, medications, labs and imaging.I personally reviewed no images or EKG's today.    Sandro Maradiaga MD  [facility]  Text Page  (7am - 6pm)        Interval History:        SOB improving  No CP, 100 F this am  Still feels congested  No new physical complaints         Physical Exam:        Physical Exam   Temp: 99.8  F (37.7  C) Temp src: Oral BP: 159/76   Heart Rate: 92 Resp: 18 SpO2: 92 % O2 Device: Nasal cannula Oxygen Delivery: 3 LPM  Vitals:    10/12/18 2016 10/13/18 0713   Weight: 136.1 kg (300 lb) 125.2 kg (276 lb)     Vital Signs with Ranges  Temp:  [98.7  F (37.1  C)-100  F (37.8  C)] 100  F (37.8  C)  Heart Rate:  [] 92  Resp:  [18] 18  BP: (110-159)/(67-76) 159/76  SpO2:  [92 %-96 %] 92 %       Constitutional:Awake, alert, cooperative, no apparent distress  Respiratory: bibasilar crackles, no wheezing, normal respiratory effort  Cardiovascular: Regular rate and rhythm, normal S1 and S2, and no murmur noted  GI: Normal bowel sounds, soft, non-distended, non-tender  Skin/Integumen: No rashes, no cyanosis, no edema  Other: normal mood and affect  Neuro: A/Ox3. Moving all extremities, speech fluent    # Pain Assessment:  Current Pain Score 10/12/2018   Patient currently in pain? denies   Pain score (0-10) 0   Pain location -   Pain descriptors -   Ron s pain level was assessed and he currently denies pain.           Data:     CBC RESULTS:    Recent Labs  Lab 10/13/18  0705 10/12/18  2032 10/12/18  0035   WBC 7.6 8.2  --    RBC 3.78* 3.76*  --    HGB 11.0* 10.9*  --    HCT 34.9* 34.5*  --     190 181       BASIC METABOLIC PANEL:    Recent Labs  Lab 10/13/18  0705 10/12/18  2032    141   POTASSIUM 4.3 3.8   CHLORIDE 107 107   CO2 31 29   BUN 18 18   CR 0.83 0.74   * 127*   RAJ 7.8* 7.2*       HEPATIC  FUNCTION PANEL    Recent Labs  Lab 10/12/18  2032   AST 22   ALT 34   ALKPHOS 57   BILITOTAL 0.5     INR  No results for input(s): INR in the last 168 hours.    OTHER LABS    Recent Labs  Lab 10/13/18  0705 10/12/18  2032 10/12/18  0035   WBC 7.6 8.2  --    HGB 11.0* 10.9*  --    HCT 34.9* 34.5*  --    MCV 92 92  --     190 181       Recent Labs  Lab 10/13/18  0705 10/12/18  2032    141   POTASSIUM 4.3 3.8   CHLORIDE 107 107   CO2 31 29   ANIONGAP 2* 5   * 127*   BUN 18 18   CR 0.83 0.74   GFRESTIMATED >90 >90   GFRESTBLACK >90 >90   RAJ 7.8* 7.2*       Medications       aspirin chewable tablet 81 mg  81 mg Oral Daily     azithromycin  500 mg Intravenous Q24H     cefTRIAXone  1 g Intravenous Q24H     heparin  5,000 Units Subcutaneous Q12H     metoprolol tartrate (LOPRESSOR) half-tab 12.5 mg  12.5 mg Oral BID     PARoxetine (PAXIL) tablet 10 mg  10 mg Oral Daily     predniSONE  40 mg Oral Daily     tamsulosin  0.4 mg Oral BID         Recent Results (from the past 24 hour(s))   XR Chest 2 Views    Narrative    CHEST TWO VIEWS 10/12/2018 9:13 PM     HISTORY: Short of breath. Fever.     COMPARISON: Chest CT 6/13/2017      Impression    IMPRESSION: Lungs are hypoinflated with patchy bilateral perihilar and  basilar opacities, suspicious for infection or aspiration. No evidence  of pleural effusion. Heart size is unchanged.    NADYA BONILLA MD

## 2018-10-13 NOTE — H&P
Hospitalist  History and Physical    Date of Admission:  10/12/2018    Assessment & Plan      Ron Gilmore is a 78 year old male who is being admitted to [facility] on 10/12/18 for further treatment of shortness of breath.    Acute hypoxic and hypercapnic respiratory failure  Community-acquired pneumonia  Chronic obstructive pulmonary disease  Assessment: presenting with two day hx of SOB following carpet at his residence being cleaned. CXR showed patchy bilateral perihilar and basilar opacities. Likely has CAP. No wheezing appreciated. Likely has component of COPD exacerbation as well given SOB occurred after carpet cleaned. Troponin wnl, no chest pain present thus doubt ACS, Pro BNP wnl. WBC wnl. Patient improved with O2 and antibiotics in ED.  Plan:  - Admit to inpatient  - IV Ceftriaxone and Azithromycin  - Supplemental O2 as needed, goal > 88%  - Follow vitals/temp  - PT/OT/SW  - Duonebs as needed  - Prednisone 40 mg daily    Suspected sleep apnea  Assessment: Noted in past admissions that he had an apneic spell.  It was recommended that he have an outpatient sleep study  Plan:   -  continue to recommend sleep study at discharge    Type 2 diabetes  Assessment: diet controlled. Has not had a hemoglobin A1c checked since 2006.   Plan:  - noted    Urinary Retention  Benign prostatic hypertrophy  Assessment: Chirinos catheter in place and PTA on Tamsulosin. UA showed Large LETs and > 182 WBC. Doubt this is true UTI given his chronic chirinos and patient is already on ceftriaxone  Plan:  - Continue prior to admission tamsulosin.     Depression  Assessment/Plan: Continue prior to admission paroxetine.     Morbid obesity  Assessment: encouraged weight loss. BMI 40.69    # Pain Assessment:  Current Pain Score 10/12/2018   Patient currently in pain? -   Pain score (0-10) 0   Pain location -   Pain descriptors -   Ron s pain level was assessed and he currently denies pain.      DVT Prophylaxis:  Heparin SQ  Code Status: Full Code    Disposition: Expected discharge in 1-2 days    Sandro Maradiaga MD    Primary Care Physician   Augustin Thomas    Chief Complaint     Shortness of Breath    History is obtained from the patient    History of Present Illness     Ron Gilmore is a 78 year old male, with a history of COPD, BPH, depression who presents for further evaluation of shortness of breath.     The patient reports he developed shortness of breath 2 nights ago after the carpet at his residence was cleaned. At baseline, patient is not on home supplemental O2. Patient reports having a fever of 100.4.     The patient notes his indwelling chirinos catheter has not been changed since last month, placed for urinary retention. He does not have an appointment with urology until 10/23/18. At baseline patient ambulates with a motorized wheelchair. Patient otherwise denies any chills, cough, chest pain, nausea, vomiting, or abdominal pain. He has no other complaints at this time.     Past Medical History    I have reviewed this patient's medical history and updated it with pertinent information if needed.   Past Medical History:   Diagnosis Date     BPH (benign prostatic hyperplasia)      Cataract      Cholelithiasis      COPD (chronic obstructive pulmonary disease) (H)      Depression      Diabetes mellitus     Type 2     Dyshidrotic foot dermatitis      Edema      Gout      Hyperlipidemia      Hypertension      Kidney stones     Bladder Stones     Lumbago      Lumbar disc displacement without myelopathy      Muscle weakness      Neuropathy, diabetic (H)      Obesity      Spinal stenosis      Stroke (H)     with residual effects- weakness LUE> LLE     Unsteady gait      Urinary retention with incomplete bladder emptying      UTI (urinary tract infection)        Past Surgical History   I have reviewed this patient's surgical history and updated it with pertinent information if needed.  Past Surgical History:   Procedure  Laterality Date     APPENDECTOMY OPEN       ARTHROSCOPY SHOULDER ROTATOR CUFF REPAIR       cataracts Bilateral      CHOLECYSTECTOMY       COLONOSCOPY       CYSTOSCOPY  10/19/2011    Procedure:CYSTOSCOPY; CYSTOSCOPY, BLADDER STONE REMOVAL; Surgeon:ROB SAWYER; Location: OR     CYSTOSCOPY, TRANSURETHRAL RESECTION (TUR) PROSTATE, COMBINED N/A 2/21/2018    Procedure: COMBINED CYSTOSCOPY, TRANSURETHRAL RESECTION (TUR) PROSTATE;  COMBINED CYSTOSCOPY, TRANSURETHRAL RESECTION (TUR) PROSTATE ;  Surgeon: Rob Sawyer MD;  Location:  OR     EYE SURGERY      right lid surgery      JOINT REPLACEMENT Right     HIP     KNEE SURGERY Bilateral      LAMINECTOMY LUMBAR ONE LEVEL       TONSILLECTOMY         Prior to Admission Medications   Prior to Admission Medications   Prescriptions Last Dose Informant Patient Reported? Taking?   ALLOPURINOL PO 10/12/2018 at am Self Yes Yes   Sig: Take 300 mg by mouth daily   ASPIRIN PO 10/12/2018 at am Self Yes Yes   Sig: Take 81 mg by mouth daily   Emollient (AMLACTIN ULTRA EX) Past Week at Unknown time Self Yes Yes   Sig: Externally apply topically daily APPLY TO FEET    Metoprolol Tartrate (LOPRESSOR PO) 10/12/2018 at am Self Yes Yes   Sig: Take 12.5 mg by mouth 2 times daily (0.5 x 25 mg tablet = 12.5 mg dose)   PARoxetine HCl (PAXIL PO) 10/12/2018 at am Self Yes Yes   Sig: Take 10 mg by mouth daily   albuterol (2.5 MG/3ML) 0.083% neb solution prn Self No Yes   Sig: Take 1 vial (2.5 mg) by nebulization every 2 hours as needed for shortness of breath / dyspnea or other (dyspnea)   fluocinonide-emollient (LIDEX-E) 0.05 % cream prn Self Yes Yes   Sig: Apply topically 2 times daily as needed   order for DME  Self No No   Sig: Equipment being ordered: Other: Home nebulizer  Treatment Diagnosis: COPD/Pneumonia   tamsulosin (FLOMAX) 0.4 MG 24 hr capsule 10/12/2018 at am Self Yes Yes   Sig: Take 0.4 mg by mouth 2 times daily       Facility-Administered Medications: None     Allergies    No Known Allergies    Social History   I have reviewed this patient's social history and updated it with pertinent information if needed. Ron Gilmore  reports that he has quit smoking. He has never used smokeless tobacco. He reports that he does not drink alcohol or use illicit drugs.    Family History   Family history reviewed with patient and is noncontributory.    Review of Systems   The 10 point Review of Systems is negative other than noted in the HPI or here.     Physical Exam   Temp: 99.9  F (37.7  C) Temp src: Oral BP: 110/69   Heart Rate: 102   SpO2: 94 % O2 Device: Nasal cannula Oxygen Delivery: 3 LPM  Vital Signs with Ranges  Temp:  [99.9  F (37.7  C)] 99.9  F (37.7  C)  Heart Rate:  [102] 102  BP: (110)/(69) 110/69  SpO2:  [94 %] 94 %  300 lbs 0 oz    Constitutional: Awake, alert, cooperative, no apparent distress.  Eyes: Conjunctiva and pupils examined and normal.  HEENT: Moist mucous membranes, normal dentition.  Respiratory: Bibasilar crackles with no wheezing.  Cardiovascular: Regular rate and rhythm, normal S1 and S2, and no murmur noted.  GI: Soft, non-distended, non-tender, normal bowel sounds.  Lymph/Hematologic: No anterior cervical or supraclavicular adenopathy.  Skin: No rashes, no cyanosis, 1+ edema.  Musculoskeletal: No joint swelling, erythema or tenderness.  Neurologic: Cranial nerves 2-12 intact, normal strength and sensation.  Psychiatric: Alert, oriented to person, place and time, no obvious anxiety or depression.    Data   Data reviewed today:  I personally reviewed the chest x-ray image(s) showing see below.    CXR  Lungs are hypoinflated with patchy bilateral perihilar and  basilar opacities, suspicious for infection or aspiration. No evidence  of pleural effusion. Heart size is unchanged.    LABS      Recent Labs  Lab 10/12/18  2032   WBC 8.2   HGB 10.9*   HCT 34.5*   MCV 92          Recent Labs  Lab 10/12/18  2032      POTASSIUM 3.8   CHLORIDE 107   CO2 29    ANIONGAP 5   *   BUN 18   CR 0.74   GFRESTIMATED >90   GFRESTBLACK >90   RAJ 7.2*       Recent Labs  Lab 10/12/18  2032   NTBNPI 601       Recent Labs  Lab 10/12/18  2032   LACT 1.2       Recent Labs  Lab 10/12/18  2032   TROPI <0.015     Recent Results (from the past 24 hour(s))   XR Chest 2 Views    Narrative    CHEST TWO VIEWS 10/12/2018 9:13 PM     HISTORY: Short of breath. Fever.     COMPARISON: Chest CT 6/13/2017      Impression    IMPRESSION: Lungs are hypoinflated with patchy bilateral perihilar and  basilar opacities, suspicious for infection or aspiration. No evidence  of pleural effusion. Heart size is unchanged.    NADYA BONILLA MD

## 2018-10-13 NOTE — ED PROVIDER NOTES
History     Chief Complaint:  Shortness of Breath    HPI   Ron Gilmore is a 78 year old male, with a history of COPD and stroke, who presents to the ED for evaluation of shortness of breath. The patient reports he developed shortness of breath 2 nights ago after the carpet at his residence was cleaned. He does not use home supplemental O2. He has a rattle in his chest which is similar to when he had pneumonia last year. He also had a fever of 100.4. The patient notes his indwelling Cardona catheter has not been changed since last month. He does not have an appointment with urology until 10/23/18. He does use a motorized wheelchair. The patient denies any chills, cough, chest pain, nausea, vomiting, or abdominal pain.     Allergies:  No known drug allergies    Medications:    Albuterol neb  Metoprolol  Allopurinol  Emollient  Paxil  Flomax  Aspirin 81mg     Past Medical History:    BPH   Cholelithiasis   Cataract  COPD   Depression   Type 2 DM   Dyshidrotic foot dermatitis   Edema   Gout   HLD  HTN  Kidney stones   Lumbago   Lumbar disc displacement   Diabetic neuropathy   Spinal stenosis   Stroke   UTI    Past Surgical History:    Appendectomy  Bilateral cataract surgery  Rotator cuff repair   Cholecystectomy  Bladder stone removal  TURP  Right eyelid surgery  Right hip joint replacement   Bilateral knee surgery  Lumbar laminectomy, 1 level   Tonsillectomy     Family History:    Prostate cancer    Social History:  Smoking status: Former smoker   Alcohol use: No  Presents to ED with step-daughter    Marital Status:   [2]     Review of Systems   Constitutional: Positive for fever. Negative for chills.   Respiratory: Positive for shortness of breath. Negative for cough.    Cardiovascular: Negative for chest pain.   Gastrointestinal: Negative for abdominal pain, nausea and vomiting.   All other systems reviewed and are negative.    Physical Exam     Patient Vitals for the past 24 hrs:   BP Temp Temp src Heart  Rate SpO2 Height Weight   10/12/18 2016 110/69 99.9  F (37.7  C) Oral 102 94 % 1.829 m (6') 136.1 kg (300 lb)   10/12/18 2015 110/69 - - - - - -     Physical Exam  General: Alert, appears well-developed and well-nourished. Cooperative.     In moderate respiratory distress  HEENT:  Head:  Atraumatic  Ears:  External ears are normal  Mouth/Throat:  Oropharynx is without erythema or exudate and mucous membranes are dry.   Eyes:   Conjunctivae normal and EOM are normal. No scleral icterus.    Pupils are equal, round, and reactive to light.   CV:  Tachycardic rate, regular rhythm, normal heart sounds and radial pulses are 2+ and symmetric.  No murmur.  Resp:  Breath sounds are coarse bilaterally with bibasilar rales.     Mild laboring, no retractions.  85-86% on room air.   GI:  Obese.  Abdomen is soft, no distension, no tenderness. No rebound or guarding.  No CVA tenderness bilaterally  MS:  Normal range of motion. Trace edema.    Normal strength in all 4 extremities.     Back atraumatic.    No midline cervical, thoracic, or lumbar tenderness  Skin:  Warm and dry.  No rash or lesions noted.  Neuro: Alert. Normal strength.  GCS: 15  Psych:  Normal mood and affect.    Emergency Department Course     ECG (21:54:20):  Rate 92 bpm. NH interval 166. QRS duration 90. QT/QTc 370/457. P-R-T axes 56 1 54. Sinus rhythm with marked sinus arrhythmia. Nonspecific ST abnormality. Abnormal ECG. No significant change compared to EKG dated 2/13/18. Interpreted at 2210 by Adriano Villasenor MD.    Imaging:  Radiographic findings were communicated with the patient and family who voiced understanding of the findings.    XR Chest 2 Views  IMPRESSION: Lungs are hypoinflated with patchy bilateral perihilar and  basilar opacities, suspicious for infection or aspiration. No evidence  of pleural effusion. Heart size is unchanged. As read by Radiology.     Laboratory:  UA: Blood small, Protein albumin 30, Leukocyte esterase large, RBC 9(H), WBC  >182(H), WBC clumps present, Squamous epithelial 5(H), Mucous present, o/w Negative     Urine culture: In process     Nt Pro BNP: 601  Troponin I (2032): <0.015  Lactic acid (2122): 1.2    CBC: HGB 10.9(L), o/w WNL (WBC 8.2, )  CMP: Glucose 127(H), Calcium 7.2(L), Albumin 2.3(L), Protein total 6.1(L), o/w WNL (Creatinine 0.74)     Blood cultures x2: In process     Interventions:  2119: Duoneb 3mLs Neb   2137: Lactated ringers 1L Bolus IV   2223: Rocephin 2g IV  2224: Azithromycin 500mg IV    Emergency Department Course:  Patient arrived by EMS.     Past medical records, nursing notes, and vitals reviewed.  2043: I performed an exam of the patient and obtained history, as documented above.    IV inserted and blood drawn.    The patient was sent for a chest x-ray while in the emergency department, findings above.    Patient's Cardona catheter was removed.     2213: I rechecked the patient. Explained findings to patient and step-daughter.    2220: I spoke to Dr. Maradiaga of the hospitalist service who accepts the patient for admission.     Findings and plan explained to the Patient and step-daughter who consents to admission. Discussed the patient with Dr. Maradiaga, who will admit the patient to a [facility] bed for further monitoring, evaluation, and treatment.     Impression & Plan      Medical Decision Making:  Patient is a 78-year-old male with a complex past medical history pertinent for COPD, diabetes, hypertension, and obesity who presents with shortness of breath since Wednesday.  Patient appears to have a bilateral perihilar pneumonia on chest x-ray here today.  His symptoms are consistent with a pneumonia as he has had a cough, shortness of breath, and difficulty breathing since Wednesday.  He was admitted for pneumonia over 1 year ago and required hospitalization at that time due to hypoxia.  Patient is hypoxic to 86% on room air here in the emergency department.  He is requiring 2L of oxygen by nasal cannula.  He  does not normally use oxygen at home.  Reassuringly lactate is not elevated, low concern for severe sepsis or septic shock.  BNP is within normal limits. No clinical concerns for heart failure today.  Patient's lab work is relatively reassuring except for what is suspicious for a urinary tract infection.  Of note, patient has chronic urinary retention and ultimately had a Cardona catheter that was placed within the last several weeks.  He has not yet followed up with urology and thus still has a retained Cardona catheter.  The Cardona catheter was removed during the emergency visit today.  He was able to void while here in the emergency department and a repeat Cardona was not placed at this time.  Patient started on Ceftriaxone and Azithromycin for suspected community acquired pneumonia.  Discussed case with Dr. Maradiaga who agreed to inpatient admission at this time.    Diagnosis:    ICD-10-CM   1. Community acquired pneumonia, unspecified laterality J18.9   2. Hypoxia R09.02   3. Shortness of breath R06.02     Disposition: Patient admitted to a [facility] bed by Dr. Ashwini Carvajal  10/12/2018    EMERGENCY DEPARTMENT    Scribe Disclosure:  IMonserrat, am serving as a scribe at 8:43 PM on 10/12/2018 to document services personally performed by Adriano Villasenor MD based on my observations and the provider's statements to me.        Adriano Villasenor MD  10/13/18 0056

## 2018-10-13 NOTE — PROGRESS NOTES
RECEIVING UNIT ED HANDOFF REVIEW    ED Nurse Handoff Report was reviewed by: Nikki Esparza on October 12, 2018 at 11:02 PM

## 2018-10-13 NOTE — PLAN OF CARE
Problem: Patient Care Overview  Goal: Plan of Care/Patient Progress Review  Speech therapy orders received, chart reviewed. MD with concern for aspiration, pt currently on regular diet, no prior speech hx per chart. Pt currently soundly sleeping, difficult to arouse and requesting to continue sleeping. Will re-attempt evaluation tomorrow AM. Hold PO if any overt concerns noted.

## 2018-10-13 NOTE — PLAN OF CARE
Problem: Patient Care Overview  Goal: Plan of Care/Patient Progress Review  Outcome: No Change  Pt A&Ox4. VSS exp Qlew=560.0F. On 1.5L O2 with occasional desats, recovers quickly. Lung sounds dim/congested, pt states unable to cough up, refusing IS, c/o SOB, given robitussin x1. No c/o of pain. Regular diet, good appetite. Cardona removed in ED, voiding per urinal, no retention noted. Blanchable pink coccyx, frequent repositioning encouraged. Left sided weakness d/t previous stroke. A2/lift, wheel chair bound at baseline, independently repositions. L PIV SL. Contact precautions in place for VRE.

## 2018-10-13 NOTE — PLAN OF CARE
Problem: Patient Care Overview  Goal: Plan of Care/Patient Progress Review  Pt arrived to unit around 1200 am, pt is A&Ox4. VSS on 3L O2. No c/o of pain overnight. Lung sounds coarse, sounded congested but pt states he could not cough to clear it. Regular diet. Voided in urinal overnight. A2/lift. Independently repositions. L PIV SL. Rested well overnight.

## 2018-10-14 ENCOUNTER — APPOINTMENT (OUTPATIENT)
Dept: SPEECH THERAPY | Facility: CLINIC | Age: 78
DRG: 193 | End: 2018-10-14
Attending: STUDENT IN AN ORGANIZED HEALTH CARE EDUCATION/TRAINING PROGRAM
Payer: COMMERCIAL

## 2018-10-14 PROCEDURE — 99232 SBSQ HOSP IP/OBS MODERATE 35: CPT | Performed by: STUDENT IN AN ORGANIZED HEALTH CARE EDUCATION/TRAINING PROGRAM

## 2018-10-14 PROCEDURE — 40000894 ZZH STATISTIC OT IP EVAL DEFER: Performed by: OCCUPATIONAL THERAPIST

## 2018-10-14 PROCEDURE — 25000125 ZZHC RX 250: Performed by: STUDENT IN AN ORGANIZED HEALTH CARE EDUCATION/TRAINING PROGRAM

## 2018-10-14 PROCEDURE — 12000000 ZZH R&B MED SURG/OB

## 2018-10-14 PROCEDURE — 92610 EVALUATE SWALLOWING FUNCTION: CPT | Mod: GN

## 2018-10-14 PROCEDURE — 40000225 ZZH STATISTIC SLP WARD VISIT

## 2018-10-14 PROCEDURE — 25000128 H RX IP 250 OP 636: Performed by: STUDENT IN AN ORGANIZED HEALTH CARE EDUCATION/TRAINING PROGRAM

## 2018-10-14 PROCEDURE — 25000132 ZZH RX MED GY IP 250 OP 250 PS 637: Performed by: STUDENT IN AN ORGANIZED HEALTH CARE EDUCATION/TRAINING PROGRAM

## 2018-10-14 RX ORDER — PIPERACILLIN SODIUM, TAZOBACTAM SODIUM 3; .375 G/15ML; G/15ML
3.38 INJECTION, POWDER, LYOPHILIZED, FOR SOLUTION INTRAVENOUS EVERY 6 HOURS
Status: DISCONTINUED | OUTPATIENT
Start: 2018-10-14 | End: 2018-10-16

## 2018-10-14 RX ORDER — CEFTRIAXONE 2 G/1
2 INJECTION, POWDER, FOR SOLUTION INTRAMUSCULAR; INTRAVENOUS EVERY 24 HOURS
Status: DISCONTINUED | OUTPATIENT
Start: 2018-10-14 | End: 2018-10-14

## 2018-10-14 RX ADMIN — PAROXETINE HYDROCHLORIDE 10 MG: 10 TABLET, FILM COATED ORAL at 08:41

## 2018-10-14 RX ADMIN — PIPERACILLIN SODIUM,TAZOBACTAM SODIUM 3.38 G: 3; .375 INJECTION, POWDER, FOR SOLUTION INTRAVENOUS at 21:42

## 2018-10-14 RX ADMIN — HEPARIN SODIUM 5000 UNITS: 5000 INJECTION, SOLUTION INTRAVENOUS; SUBCUTANEOUS at 21:41

## 2018-10-14 RX ADMIN — Medication 12.5 MG: at 08:42

## 2018-10-14 RX ADMIN — TAMSULOSIN HYDROCHLORIDE 0.4 MG: 0.4 CAPSULE ORAL at 21:42

## 2018-10-14 RX ADMIN — PREDNISONE 40 MG: 20 TABLET ORAL at 08:41

## 2018-10-14 RX ADMIN — HEPARIN SODIUM 5000 UNITS: 5000 INJECTION, SOLUTION INTRAVENOUS; SUBCUTANEOUS at 08:43

## 2018-10-14 RX ADMIN — PIPERACILLIN SODIUM,TAZOBACTAM SODIUM 3.38 G: 3; .375 INJECTION, POWDER, FOR SOLUTION INTRAVENOUS at 15:13

## 2018-10-14 RX ADMIN — TAMSULOSIN HYDROCHLORIDE 0.4 MG: 0.4 CAPSULE ORAL at 08:41

## 2018-10-14 RX ADMIN — ASPIRIN 81 MG 81 MG: 81 TABLET ORAL at 08:41

## 2018-10-14 RX ADMIN — Medication 12.5 MG: at 21:52

## 2018-10-14 ASSESSMENT — ACTIVITIES OF DAILY LIVING (ADL)
ADLS_ACUITY_SCORE: 26

## 2018-10-14 NOTE — PROGRESS NOTES
Hospitalist Progress Note    Date of Service: 10/14/2018         Assessment and Plan:       Ron Gilmore is a 78 year old male who was admitted to [facility] on 10/12/18 for further treatment of shortness of breath.    Acute hypoxic and hypercapnic respiratory failure  Community-acquired pneumonia  Chronic obstructive pulmonary disease  Assessment: presenting with two day hx of SOB following carpet at his residence being cleaned. CXR showed patchy bilateral perihilar and basilar opacities. Likely has CAP. No wheezing appreciated. Likely has component of COPD exacerbation as well given SOB occurred after carpet cleaned. Was hypoxic with sats in 80s in ED prior to supplemental O2. Troponin wnl, no chest pain present thus doubt ACS, Pro BNP wnl. WBC wnl. Patient clinically improving with O2 and antibiotics. Do have some concern of aspiration. BC and UC positive for GPC and GNR respectively.  Plan:  - Switch to Zosyn for anaerobic coverage  - Supplemental O2 as needed, goal > 88%, wean as tolerates  - Follow vitals/temp  - PT/OT/SW  - Duonebs as needed  - Prednisone 40 mg daily, 5 day burst    Dysphagia  Assessment: patient reports coughing with food intake. SLP evaluated, concern for aspiration  Plan:  - Video swallow study tomorrow  - Dysphagia diet     Suspected sleep apnea  Assessment: Noted in past admissions that he had an apneic spell.  It was recommended that he have an outpatient sleep study  Plan:   -  continue to recommend sleep study at discharge     Type 2 diabetes  Assessment: diet controlled. Has not had a hemoglobin A1c checked since 2006.   Plan:  - noted     Urinary Retention  Benign prostatic hypertrophy  Assessment: Chirinos catheter in place and PTA on Tamsulosin. UA showed Large LETs and > 182 WBC. Doubt this is true UTI given his chronic chirinos and patient is already on ceftriaxone  Plan:  - Continue prior to admission tamsulosin.      Depression  Assessment/Plan: Continue prior  to admission paroxetine.      Morbid obesity  Assessment: encouraged weight loss. BMI 40.69      Active Diet Order      Combination Diet Dysphagia Diet Level 3: Advanced; Nectar Thickened Liquids (pre-thickened or use instant food thickener)    DVT Prophylaxis: Pneumatic Compression Devices  Code Status: Full Code    Disposition: Expected discharge in 1-2 days    Attestation:   I have reviewed today's relevant vital signs, notes, medications, labs and imaging.I personally reviewed no images or EKG's today.    Sandro Maradiaga MD  [facility]  Text Page  (7am - 6pm)        Interval History:        Patient seen and examined  Weaned off O2  No fevers overnight  No CP/SOB  UC and BC positive         Physical Exam:        Physical Exam   Temp: 97.2  F (36.2  C) Temp src: Oral BP: 125/72   Heart Rate: 65 Resp: 18 SpO2: 97 % O2 Device: Nasal cannula Oxygen Delivery: 1.5 LPM  Vitals:    10/12/18 2016 10/13/18 0713 10/14/18 0700   Weight: 136.1 kg (300 lb) 125.2 kg (276 lb) 124.3 kg (274 lb)     Vital Signs with Ranges  Temp:  [96.1  F (35.6  C)-97.3  F (36.3  C)] 97.2  F (36.2  C)  Heart Rate:  [62-70] 65  Resp:  [18] 18  BP: (107-166)/(64-79) 125/72  SpO2:  [93 %-97 %] 97 %  I/O last 3 completed shifts:  In: 120 [P.O.:120]  Out: 425 [Urine:425]    Constitutional:Awake, alert, cooperative, no apparent distress  Respiratory: bibasilar crackles, no wheezing, normal respiratory effort  Cardiovascular: Regular rate and rhythm, normal S1 and S2, and no murmur noted  GI: Normal bowel sounds, soft, non-distended, non-tender  Skin/Integumen: No rashes, no cyanosis, no edema  Other: normal mood and affect  Neuro: A/Ox3. Moving all extremities, speech fluent    # Pain Assessment:  Current Pain Score 10/14/2018   Patient currently in pain? denies   Pain score (0-10) -   Pain location -   Pain descriptors -   Ron s pain level was assessed and he currently denies pain.           Data:     CBC RESULTS:    Recent  Labs  Lab 10/13/18  0705 10/12/18  2032 10/12/18  0035   WBC 7.6 8.2  --    RBC 3.78* 3.76*  --    HGB 11.0* 10.9*  --    HCT 34.9* 34.5*  --     190 181       BASIC METABOLIC PANEL:    Recent Labs  Lab 10/13/18  0705 10/12/18  2032    141   POTASSIUM 4.3 3.8   CHLORIDE 107 107   CO2 31 29   BUN 18 18   CR 0.83 0.74   * 127*   RJA 7.8* 7.2*       HEPATIC FUNCTION PANEL    Recent Labs  Lab 10/12/18  2032   AST 22   ALT 34   ALKPHOS 57   BILITOTAL 0.5     INR  No results for input(s): INR in the last 168 hours.    OTHER LABS    Recent Labs  Lab 10/13/18  0705 10/12/18  2032 10/12/18  0035   WBC 7.6 8.2  --    HGB 11.0* 10.9*  --    HCT 34.9* 34.5*  --    MCV 92 92  --     190 181       Recent Labs  Lab 10/13/18  0705 10/12/18  2032    141   POTASSIUM 4.3 3.8   CHLORIDE 107 107   CO2 31 29   ANIONGAP 2* 5   * 127*   BUN 18 18   CR 0.83 0.74   GFRESTIMATED >90 >90   GFRESTBLACK >90 >90   RAJ 7.8* 7.2*       Medications       aspirin chewable tablet 81 mg  81 mg Oral Daily     azithromycin  500 mg Intravenous Q24H     cefTRIAXone  2 g Intravenous Q24H     heparin  5,000 Units Subcutaneous Q12H     metoprolol tartrate (LOPRESSOR) half-tab 12.5 mg  12.5 mg Oral BID     PARoxetine (PAXIL) tablet 10 mg  10 mg Oral Daily     predniSONE  40 mg Oral Daily     sodium chloride (PF)  3 mL Intracatheter Q8H     tamsulosin  0.4 mg Oral BID         No results found for this or any previous visit (from the past 24 hour(s)).

## 2018-10-14 NOTE — PROGRESS NOTES
"Clinical Swallow Evaluation:      10/14/18 0835   General Information   Onset Date 10/12/18   Start of Care Date 10/14/18   Referring Physician Dr. Sandro Maradiaga   Patient/Family Goals Statement To swallow better   Swallowing Evaluation Bedside swallow evaluation   Behaviorial Observations WFL (within functional limits)   Mode of current nutrition Oral diet   Type of oral diet Regular;Thin liquid   Respiratory Status O2 Supply   Type of O2 supply Nasal cannula   Comments Pt admitted with SOB. Chest x-ray reveals Lungs are hypoinflated with patchy bilateral perihilar and basilar opacities, suspicious for infection or aspiration. Speech therapy ordered review d/t concern for aspiration. Pt does endorse swallowing difficulties including coughing with liquids/liquids \"slipping down,\" harder foods sticking in throat, scatter foods going \"down the wrong pipe.\" No prior speech hx per EPIC/pt report. PMHx includes CVA, spinal stenosis, COPD.    Clinical Swallow Evaluation   Oral Musculature generally intact   Structural Abnormalities none present   Dentition upper dentures  (lower dentures not  present, dentures somewhat ill-fitting)   Mandibular Strength and Mobility intact   Oral Labial Strength and Mobility WFL   Lingual Strength and Mobility WFL   Velar Elevation intact   Buccal Strength and Mobility intact   Laryngeal Function Cough;Throat clear;Swallow;Voicing initiated;Dry swallow palpated   Additional Documentation Yes   Clinical Swallow Eval: Thin Liquid Texture Trial   Mode of Presentation, Thin Liquids cup;straw;self-fed   Volume of Liquid or Food Presented 2 oz   Oral Phase of Swallow Premature pharyngeal entry   Pharyngeal Phase of Swallow coughing/choking;reduction in laryngeal movement   Diagnostic Statement overt coughing after all trials, pt endorses feeling of it going down wrong   Clinical Swallow Eval: Nectar Thick Liquid Texture Trial   Mode of Presentation, Nectar cup;straw;self-fed   Volume of Nectar " "Presented 4 oz   Oral Phase, Nectar Premature pharyngeal entry   Pharyngeal Phase, Nectar reduction in laryngeal movement   Diagnostic Statement no overt signs/sx aspiration noted, pt reporting it goes down \"smoother.\"   Clinical Swallow Eval: Solid Food Texture Trial   Mode of Presentation, Solid self-fed   Volume of Solid Food Presented shawna cracker   Oral Phase, Solid (slow mastication and oral transit time)   Oral Residue, Solid (mild diffuse/lingual residuals)   Pharyngeal Phase, Solid reduction in laryngeal movement   Diagnostic Statement no overt signs/sx aspiration noted this date, but pt endorses issues with general consistency solids at baseline   General Therapy Interventions   Planned Therapy Interventions Dysphagia Treatment  (video swallow study monday)   Dysphagia treatment Modified diet education;Instruction of safe swallow strategies   Swallow Eval: Clinical Impressions   Skilled Criteria for Therapy Intervention Skilled criteria met.  Treatment indicated.   Functional Assessment Scale (FAS) 3   Treatment Diagnosis moderate dysphagia   Diet texture recommendations Dysphagia diet level 3;Nectar thick liquids   Recommended Feeding/Eating Techniques alternate between small bites and sips of food/liquid;maintain upright posture during/after eating for 30 mins;small sips/bites   Therapy Frequency 2 times/day   Predicted Duration of Therapy Intervention (days/wks) 1 week   Anticipated Discharge Disposition home w/ home health  (may benefit from post-acute speech pending video swallow)   Risks and Benefits of Treatment have been explained. Yes   Patient, family and/or staff in agreement with Plan of Care Yes   Clinical Impression Comments Clincial swallow evaluation completed with thin liquids, nectar thick liquids and general solids. Suspect significant premature spillage and delayed swallow initiation with thin liquids, resulting in overt coughing on all trials. Oral control and timliness of  swallow " "more functional with nectar thick liquids and no overt signs/sx aspiration noted with same, pt reporting it goes down \"smoother.\" Mastication, oral transit time are mild-mod prolonged with general solids, likely secondary to ill-fitting and no lower dentures present. Oral clearance complete with additional time/liquid wash. No overt signs/sx aspiration noted with general  solids but pt does endorse significant difficulty/coughing with general solid items (e.g., chips) at home. Recommend: DD3, nectar thick liquids with slow pace, small bites/sips, liquid wash. Educated on video swallow study as this diet is below baseline diet and highly suspect aspiration with thin liquids. Pt agreeable to evaluation. He is agreeable to short-term nectar thick liquid diet modification, but would like to know it is 100% needed before actually implementing same  at home. Video swallow to be ordered.    Total Evaluation Time   Total Evaluation Time (Minutes) 45     "

## 2018-10-14 NOTE — PLAN OF CARE
Problem: Patient Care Overview  Goal: Plan of Care/Patient Progress Review  Outcome: No Change  A&Ox4, sleepy. VSS on 1.5L. Denies pain. Infrequent congested nonproductive cough. Voiding in the urinal, bladder scanning post void. Regular diet, didn't eat much this evening. Took pills without difficulty. PIV SL, intermittent abx. Wheelchair bound at baseline, up with a lift. Independently positioning with frequent reminders. Will continue to monitor.

## 2018-10-14 NOTE — PLAN OF CARE
"Problem: Patient Care Overview  Goal: Plan of Care/Patient Progress Review  Discharge Planner SLP   Patient plan for discharge: none stated  Current status: Swallow evaluation orders received as MD with concern from aspiration. Clincial swallow evaluation completed with thin liquids, nectar thick liquids and general solids. Suspect significant premature spillage and delayed swallow initiation with thin liquids, resulting in overt coughing on all trials. Oral control and timliness of  swallow more functional with nectar thick liquids and no overt signs/sx aspiration noted with same, pt reporting it goes down \"smoother.\" Mastication, oral transit time are mild-mod prolonged with general solids, likely secondary to ill-fitting and no lower dentures present. Oral clearance complete with additional time/liquid wash. No overt signs/sx aspiration noted with general  solids but pt does endorse significant difficulty/coughing with general solid items (e.g., chips) at home. Recommend: DD3, nectar thick liquids with slow pace, small bites/sips, liquid wash. Educated on video swallow study as highly suspect aspiration with thin liquids. Pt agreeable to evaluation. He is agreeable to short-term nectar thick liquid diet modification, but would like to know it is 100% needed before actually implementing same at home. Video swallow to be ordered.   Barriers to return to prior living situation: video swallow study to be completed Monday  Recommendations for discharge: TBD pending video swallow study, likely home with either home or OP services  Rationale for recommendations: Objective evaluation indicated to assess oropharyngeal swallow function, determine least restrictive safest diet, determine if swallow strategies are needed       Entered by: Carlyn Aguilar 10/14/2018 9:39 AM         "

## 2018-10-14 NOTE — PLAN OF CARE
Problem: Patient Care Overview  Goal: Plan of Care/Patient Progress Review  Pt is A&Ox4. Lethargic. VSS on 1.5L O2. No c/o pain overnight. Infrequent congested cough. Refusing IS and to cough - lung sounds coarse/congested. +1 BLE edema. Blanchable pink coccyx. Reminded pt to reposition - able to independently reposition. Voids in urinal. Regular diet. L PIV SL. Rested well overnight.

## 2018-10-14 NOTE — PROVIDER NOTIFICATION
MD Notification    Notified Person: MD    Notified Person Name: Dr Hookynh    Notification Date/Time: 10/14/2018 10:21 AM    Notification Interaction: Web-paged MD    Purpose of Notification: Lab reporting blood culture with gram pos cocci clusters    Orders Received: MD placed Infectious Disease consult and changed antibiotic coverage    Comments:

## 2018-10-14 NOTE — CONSULTS
Care Transition Initial Assessment - SW  Reason For Consult: discharge planning  Met with: PATIENT    Active Problems:    CAP (community acquired pneumonia)    Major depressive disorder, recurrent episode (H)    Urinary retention    COPD (chronic obstructive pulmonary disease) (H)       DATA  Lives With: spouse  Living Arrangements: condominium  Description of Support System: Supportive, Involved  Who is your support system?: Wife  Support Assessment: Adequate family and caregiver support.   Identified issues/concerns regarding health management: SW following for d.c needs   Quality Of Family Relationships: supportive, involved     ASSESSMENT  Cognitive Status: Alert and oriented  Concerns to be addressed: Patient is a 78 year old male who was admitted to the hospital for CAP. Prior to hospitalization patient was living at home in a condo with spouse where he was managing well. Patient is w/c bound at baseline and receives PCA services (15 hours a week). Patient's spouse does all other cares for patient during the week. Patient's children assist with his cares on the weekend. Patient is to be assessed by SLP/OT/PT. SLP  Assessed and changed diet to DD3 diet. Patient is aware of this. SW will continue to follow and assist with d.c needs once assessments are complete.      PLAN  Financial costs for the patient includes   Patient given options and choices for discharge   Patient/family is agreeable to the plan?  YES  Patient Goals and Preferences: TBD.  Patient anticipates discharging to: TBD.    TIFFANY Lara   *[phone]

## 2018-10-14 NOTE — PROVIDER NOTIFICATION
MD Notification    Notified Person: MD    Notified Person Name: Dr Maradiaga    Notification Date/Time: 10/14/2018 9:52 AM    Notification Interaction: Web-paged MD    Purpose of Notification: Urine pos for lactose fermenting gram neg rods    Orders Received: No new orders received    Comments:

## 2018-10-14 NOTE — PLAN OF CARE
Problem: Patient Care Overview  Goal: Plan of Care/Patient Progress Review  OT attempted.  Per nursing patient on bedpan, asked to return later.    Second attempt:  Pt still on bedpan, and declining to work with therapy.  He reports normally completing a SPT to his power wheelchair with PCA to assist; he uses a sliding board if necessary but not normally.  Patient reports PCA assists with all ADLs, meals, running errands. Rescheduling PT and OT for tomorrow.    Due to patient receiving assist with all ADLs at home, and main therapy need is for transfers, will defer therapy to PT and complete orders for OT to avoid duplication of services.  Please refer back if needs change.

## 2018-10-14 NOTE — PLAN OF CARE
Problem: Patient Care Overview  Goal: Plan of Care/Patient Progress Review  Outcome: No Change  Pt A&Ox4. VSS on RA, SpO2=89-91%. Lung sounds dim, refusing IS. No c/o of pain. DD3 diet per speech, good appetite. Blanchable pink coccyx, frequent repositioning encouraged, refusing at times. Left sided weakness/tremors d/t previous stroke. Voiding adequately. Last stool 10/12. A2/lift, wheel chair bound at baseline, independently repositions. +blood and urine cultures, MD notified (see notes). L PIV SL. Contact precautions in place for VRE.

## 2018-10-15 ENCOUNTER — APPOINTMENT (OUTPATIENT)
Dept: GENERAL RADIOLOGY | Facility: CLINIC | Age: 78
DRG: 193 | End: 2018-10-15
Attending: STUDENT IN AN ORGANIZED HEALTH CARE EDUCATION/TRAINING PROGRAM
Payer: COMMERCIAL

## 2018-10-15 ENCOUNTER — APPOINTMENT (OUTPATIENT)
Dept: SPEECH THERAPY | Facility: CLINIC | Age: 78
DRG: 193 | End: 2018-10-15
Payer: COMMERCIAL

## 2018-10-15 ENCOUNTER — APPOINTMENT (OUTPATIENT)
Dept: PHYSICAL THERAPY | Facility: CLINIC | Age: 78
DRG: 193 | End: 2018-10-15
Attending: STUDENT IN AN ORGANIZED HEALTH CARE EDUCATION/TRAINING PROGRAM
Payer: COMMERCIAL

## 2018-10-15 LAB — PLATELET # BLD AUTO: 193 10E9/L (ref 150–450)

## 2018-10-15 PROCEDURE — 99232 SBSQ HOSP IP/OBS MODERATE 35: CPT | Performed by: STUDENT IN AN ORGANIZED HEALTH CARE EDUCATION/TRAINING PROGRAM

## 2018-10-15 PROCEDURE — 40000225 ZZH STATISTIC SLP WARD VISIT: Performed by: SPEECH-LANGUAGE PATHOLOGIST

## 2018-10-15 PROCEDURE — 25000132 ZZH RX MED GY IP 250 OP 250 PS 637: Performed by: STUDENT IN AN ORGANIZED HEALTH CARE EDUCATION/TRAINING PROGRAM

## 2018-10-15 PROCEDURE — 25000128 H RX IP 250 OP 636: Performed by: STUDENT IN AN ORGANIZED HEALTH CARE EDUCATION/TRAINING PROGRAM

## 2018-10-15 PROCEDURE — 74230 X-RAY XM SWLNG FUNCJ C+: CPT

## 2018-10-15 PROCEDURE — 92611 MOTION FLUOROSCOPY/SWALLOW: CPT | Mod: GN | Performed by: SPEECH-LANGUAGE PATHOLOGIST

## 2018-10-15 PROCEDURE — 97161 PT EVAL LOW COMPLEX 20 MIN: CPT | Mod: GP | Performed by: PHYSICAL THERAPIST

## 2018-10-15 PROCEDURE — 12000000 ZZH R&B MED SURG/OB

## 2018-10-15 PROCEDURE — 92526 ORAL FUNCTION THERAPY: CPT | Mod: GN | Performed by: SPEECH-LANGUAGE PATHOLOGIST

## 2018-10-15 PROCEDURE — 85049 AUTOMATED PLATELET COUNT: CPT | Performed by: STUDENT IN AN ORGANIZED HEALTH CARE EDUCATION/TRAINING PROGRAM

## 2018-10-15 PROCEDURE — 36415 COLL VENOUS BLD VENIPUNCTURE: CPT | Performed by: STUDENT IN AN ORGANIZED HEALTH CARE EDUCATION/TRAINING PROGRAM

## 2018-10-15 PROCEDURE — 40000193 ZZH STATISTIC PT WARD VISIT: Performed by: PHYSICAL THERAPIST

## 2018-10-15 PROCEDURE — 25000125 ZZHC RX 250: Performed by: STUDENT IN AN ORGANIZED HEALTH CARE EDUCATION/TRAINING PROGRAM

## 2018-10-15 RX ORDER — POLYETHYLENE GLYCOL 3350 17 G/17G
17 POWDER, FOR SOLUTION ORAL 2 TIMES DAILY PRN
Status: DISCONTINUED | OUTPATIENT
Start: 2018-10-15 | End: 2018-10-17 | Stop reason: HOSPADM

## 2018-10-15 RX ORDER — BARIUM SULFATE 400 MG/ML
30 SUSPENSION ORAL ONCE
Status: COMPLETED | OUTPATIENT
Start: 2018-10-15 | End: 2018-10-15

## 2018-10-15 RX ORDER — SENNOSIDES 8.6 MG
2 TABLET ORAL 2 TIMES DAILY PRN
Status: DISCONTINUED | OUTPATIENT
Start: 2018-10-15 | End: 2018-10-17 | Stop reason: HOSPADM

## 2018-10-15 RX ADMIN — HEPARIN SODIUM 5000 UNITS: 5000 INJECTION, SOLUTION INTRAVENOUS; SUBCUTANEOUS at 08:59

## 2018-10-15 RX ADMIN — PREDNISONE 40 MG: 20 TABLET ORAL at 08:58

## 2018-10-15 RX ADMIN — ASPIRIN 81 MG 81 MG: 81 TABLET ORAL at 08:58

## 2018-10-15 RX ADMIN — PAROXETINE HYDROCHLORIDE 10 MG: 10 TABLET, FILM COATED ORAL at 08:58

## 2018-10-15 RX ADMIN — BARIUM SULFATE 10 ML: 400 SUSPENSION ORAL at 10:32

## 2018-10-15 RX ADMIN — HEPARIN SODIUM 5000 UNITS: 5000 INJECTION, SOLUTION INTRAVENOUS; SUBCUTANEOUS at 20:06

## 2018-10-15 RX ADMIN — TAMSULOSIN HYDROCHLORIDE 0.4 MG: 0.4 CAPSULE ORAL at 20:06

## 2018-10-15 RX ADMIN — Medication 12.5 MG: at 20:06

## 2018-10-15 RX ADMIN — PIPERACILLIN SODIUM,TAZOBACTAM SODIUM 3.38 G: 3; .375 INJECTION, POWDER, FOR SOLUTION INTRAVENOUS at 13:59

## 2018-10-15 RX ADMIN — PIPERACILLIN SODIUM,TAZOBACTAM SODIUM 3.38 G: 3; .375 INJECTION, POWDER, FOR SOLUTION INTRAVENOUS at 02:03

## 2018-10-15 RX ADMIN — BARIUM SULFATE 50 ML: 400 SUSPENSION ORAL at 10:31

## 2018-10-15 RX ADMIN — PIPERACILLIN SODIUM,TAZOBACTAM SODIUM 3.38 G: 3; .375 INJECTION, POWDER, FOR SOLUTION INTRAVENOUS at 20:06

## 2018-10-15 RX ADMIN — Medication 12.5 MG: at 08:58

## 2018-10-15 RX ADMIN — PIPERACILLIN SODIUM,TAZOBACTAM SODIUM 3.38 G: 3; .375 INJECTION, POWDER, FOR SOLUTION INTRAVENOUS at 08:58

## 2018-10-15 RX ADMIN — TAMSULOSIN HYDROCHLORIDE 0.4 MG: 0.4 CAPSULE ORAL at 08:58

## 2018-10-15 ASSESSMENT — ACTIVITIES OF DAILY LIVING (ADL)
ADLS_ACUITY_SCORE: 26
ADLS_ACUITY_SCORE: 30
ADLS_ACUITY_SCORE: 26
ADLS_ACUITY_SCORE: 26

## 2018-10-15 NOTE — PLAN OF CARE
Problem: Patient Care Overview  Goal: Plan of Care/Patient Progress Review  Outcome: Improving    Pt is A+OX4. VSS on RA. No BM since 10/12, has occasional urges to go but no results as of yet. Voiding in urinal. Independently repositions self in bed. Up with assist of 2 using mechanical lift.  NDD3 diet d/t aspiration. Swallow study scheduled for today (10/15). Discharge plan pending respiratory/infection improvements.

## 2018-10-15 NOTE — PLAN OF CARE
Problem: Patient Care Overview  Goal: Plan of Care/Patient Progress Review  Discharge Planner PT   Patient plan for discharge: Return home with spouse and PCA services.   Current status: Orders received and evaluation completed. Patient is a 77 y/o male admitted with SOB. At baseline, patient is wheelchair bound and is able to complete pivot transfers to/from power chair independently. Patient performed supine <> sit with SBA. Pt reports set up at home makes it much easier for him to transfer to/from power chair. Patient demonstrated ability to complete squat pivot transfer to/from wheelchair with SBA. Pt returned to supine at end of session. O2 sats on RA after activity: 84% quickly returning to 92% with rest break and cues for PLB. Encouraged pt to sit up in chair with assist from nursing either utilizing squat pivot technique or ceiling lift.   Barriers to return to prior living situation: None indicated.   Recommendations for discharge: Return home with assist from spouse and PCA.   Rationale for recommendations: Pt is at baseline in regards to mobility, main limitation is decreased activity tolerance. Nursing to continue to assist patient up to chair as able. No further IP PT needs indicated.        Entered by: Danielle Okeefe 10/15/2018 4:51 PM

## 2018-10-15 NOTE — PLAN OF CARE
Problem: Patient Care Overview  Goal: Plan of Care/Patient Progress Review  Outcome: No Change  Pt is A+OX4. VSS on RA, SpO2=90-92% with occasional desats. No BM since 10/12. Voiding in urinal, incontinent at times. Independently repositions self in bed. Up with lift, wheelchair bound at baseline. Changed to DD3/honey thickened liquids per swallow study, good appetite, continued diet education. Discharge pending blood/urine cultures.

## 2018-10-15 NOTE — PLAN OF CARE
Problem: Patient Care Overview  Goal: Plan of Care/Patient Progress Review  Outcome: Improving  A&Ox4. VSS on RA. Infrequent congested nonproductive cough. Denies pain. PIV SL, intermittent abx. DD3 diet, ate well this evening. Voiding adequately in urinal. Up with a lift, wheel chair bound at baseline. Independently repositions in bed. Will continue to monitor.

## 2018-10-15 NOTE — PROGRESS NOTES
10/15/18 1509   Quick Adds   Type of Visit Initial PT Evaluation   Living Environment   Lives With spouse   Living Arrangements condominium  (1st floor)   Number of Stairs to Enter Home 0   Number of Stairs Within Home 0   Transportation Available family or friend will provide  (Pt's spouse provides transportation. )   Living Environment Comment PCA services 15 hours/week.    Self-Care   Usual Activity Tolerance moderate   Current Activity Tolerance moderate   Regular Exercise no   Equipment Currently Used at Home wheelchair, power;hospital bed   Functional Level Prior   Ambulation 4-->completely dependent   Transferring 1-->assistive equipment   Toileting 1-->assistive equipment  (Bedpan and urinal)   Bathing 2-->assistive person  (Bed baths)   Dressing 2-->assistive person   Fall history within last six months no   General Information   Onset of Illness/Injury or Date of Surgery - Date 10/12/18   Referring Physician Sandro Maradiaga MD   Patient/Family Goals Statement Return home.   Pertinent History of Current Problem (include personal factors and/or comorbidities that impact the POC) 79 y/o male admitted with SOB, found to have acute hypoxia/hypercapnic respiratory failure, PNA.    Precautions/Limitations fall precautions   General Observations Pt in supine upon arrival of therapist.    General Info Comments Up with assist.    Cognitive Status Examination   Orientation orientation to person, place and time   Level of Consciousness alert   Follows Commands and Answers Questions 100% of the time   Personal Safety and Judgment intact   Pain Assessment   Patient Currently in Pain No   Integumentary/Edema   Integumentary/Edema Comments No deficits noted.    Posture    Posture Comments Noted forward head and shoulder posture upon sitting EOB.    Range of Motion (ROM)   ROM Comment B LEs WFL.    Strength   Strength Comments B LEs not formally assessed. Noted L LE weakness, per pt report baseline. Pt demonstrates at  "least 3/5 grossly in B LEs with transfers.    Bed Mobility   Bed Mobility Comments Supine <> sit, SBA with HOB elevated.    Transfer Skills   Transfer Comments Pivot transfer to/from wheelchair with SBA.    Gait   Gait Comments NT. Pt doesn't ambulate at baseline.    Balance   Balance Comments Noted good sitting balance.    Sensory Examination   Sensory Perception Comments Pt denies numbness/tingling in B LEs and UEs.    General Therapy Interventions   Planned Therapy Interventions bed mobility training;gait training;ROM;strengthening;transfer training   Clinical Impression   Criteria for Skilled Therapeutic Intervention evaluation only   Clinical Presentation Stable/Uncomplicated   Clinical Presentation Rationale Based on PMH, current presentation, and social support.    Clinical Decision Making (Complexity) Low complexity   Anticipated Discharge Disposition Home with Assist;Other (see comments)  (Continued PCA services)   Risk & Benefits of therapy have been explained Yes   Patient, Family & other staff in agreement with plan of care Yes   [facility] TM \"6 Clicks\"   2016, Trustees of [facility], under license to [employer].  All rights reserved.   6 Clicks Short Forms Basic Mobility Inpatient Short Form   [facility]  \"6 Clicks\" V.2 Basic Mobility Inpatient Short Form   1. Turning from your back to your side while in a flat bed without using bedrails? 3 - A Little   2. Moving from lying on your back to sitting on the side of a flat bed without using bedrails? 3 - A Little   3. Moving to and from a bed to a chair (including a wheelchair)? 3 - A Little   4. Standing up from a chair using your arms (e.g., wheelchair, or bedside chair)? 2 - A Lot   5. To walk in hospital room? 1 - Total   6. Climbing 3-5 steps with a railing? 1 - Total   Basic Mobility Raw Score (Score out of 24.Lower scores equate to lower levels of function) 13   Total Evaluation Time   Total Evaluation Time " (Minutes) 26

## 2018-10-15 NOTE — PROGRESS NOTES
" 10/15/18 1113   General Information   Onset Date 10/12/18   Start of Care Date 10/15/18   Referring Physician Sandro Hernandes   Patient Profile Review/OT: Additional Occupational Profile Info See Profile for full history and prior level of function   Patient/Family Goals Statement He did not state.   Swallowing Evaluation Videofluoroscopic evaluation   Behaviorial Observations Alert   Mode of current nutrition Oral diet   Type of oral diet Dysphagia diet level 3;Nectar - thick liquid   Respiratory Status Room air   Comments Pt admitted with SOB. Chest x-ray reveals Lungs are hypoinflated with patchy bilateral perihilar and basilar opacities, suspicious for infection or aspiration. Speech therapy ordered review d/t concern for aspiration. Pt does endorse swallowing difficulties including coughing with liquids/liquids \"slipping down,\" harder foods sticking in throat, scatter foods going \"down the wrong pipe.\" No prior speech hx per EPIC/pt report. PMHx includes CVA, spinal stenosis, COPD.    VFSS Evaluation   VFSS Additional Documentation Yes   VFSS Eval: Radiology   Radiologist Dr. Crisostomo   Views Taken left lateral   Physical Location of Procedure [facility]   VFSS Eval: Thin Liquid Texture Trial   Mode of Presentation, Thin Liquid cup;spoon;self-fed;fed by clinician   Order of Presentation 1 2 3   Preparatory Phase Poor bolus control   Oral Phase, Thin Liquid Premature pharyngeal entry   Pharyngeal Phase, Thin Liquid Delayed swallow reflex   Rosenbek's Penetration Aspiration Scale: Thin Liquid Trial Results 7 - contrast passes glottis, visible subglottic residue remains despite patient's response (aspiration)   Response to Aspiration unproductive reflexive involuntary cough/throat clear   Successful Strategies Trialed During Procedure, Thin Liquid chin tuck  (Not effective)   Diagnostic Statement Sawyer aspiration via the cup with a cough. Chin tuck not effective. Mild flash penetration by the spoon.    VFSS Eval: Nectar " Thick Liquid Texture Trial   Mode of Presentation, Nectar cup;spoon;self-fed;fed by clinician   Order of Presentation 4 5 6 7 10   Preparatory Phase Poor bolus control   Oral Phase, Nectar Premature pharyngeal entry   Pharyngeal Phase, Nectar Delayed swallow reflex   Rosenbek's Penetration Aspiration Scale: Nectar-Thick Liquid Trial Results 7 - contrast passes glottis, visible subglottic residue remains despite patient's response (aspiration)   Response to Aspiration, Nectar unproductive reflexive involuntary cough/throat clear   Successful Strategies Trialed During Procedure, Nectar chin tuck  (Not effective.)   Diagnostic Statement No penetration by the spoon but deep laryngeal penetration to the bottom of the cords with eventual aspiration with a cough.   VFSS Eval: Honey Thick Texture Trial   Mode of Presentation, Honey cup;self-fed   Order of Presentation 12   Preparatory Phase WFL   Oral Phase, Honey Premature pharyngeal entry   Pharyngeal Phase, Honey Delayed swallow reflex   Rosenbek's Penetration Aspiration Scale: Honey Trial Results 1 - no aspiration, contrast does not enter airway   Diagnostic Statement Delayed to the valleculae without penetration.    VFSS Eval: Puree Solid Texture Trial   Mode of Presentation, Puree spoon;fed by clinician   Order of Presentation 8   Preparatory Phase WFL   Oral Phase, Puree Poor AP movement;Premature pharyngeal entry   Pharyngeal Phase, Puree Delayed swallow reflex   Rosenbek's Penetration Aspiration Scale: Puree Food Trial Results 1 - no aspiration, contrast does not enter airway   Diagnostic Statement Mild delay to the valleculae.    VFSS Eval: Semisolid Texture Trial   Mode of Presentation, Semisolid spoon;fed by clinician   Order of Presentation 9   Preparatory Phase WFL   Oral Phase, Semisolid Poor AP movement;Premature pharyngeal entry;Residue in oral cavity   Pharyngeal Phase, Semisolid Delayed swallow reflex   Rosenbek's Penetration Aspiration Scale: Semisolid  Food Trial Results 1 - no aspiration, contrast does not enter airway   Diagnostic Statement Minimal to mild BOT residue.    VFSS Eval: Solid Food Texture Trial   Mode of Presentation, Solid spoon;fed by clinician   Order of Presentation 11   Preparatory Phase Insufficient mastication  (Increased time due to  dentition. )   Oral Phase, Solid Poor AP movement;Premature pharyngeal entry;Residue in oral cavity   Pharyngeal Phase, Solid Delayed swallow reflex   Rosenbek's Penetration Aspiration Scale: Solid Food Trial Results 1 - no aspiration, contrast does not enter airway   Diagnostic Statement Minimal BOT residue.    General Therapy Interventions   Planned Therapy Interventions Dysphagia Treatment   Dysphagia treatment Modified diet education;Instruction of safe swallow strategies   Swallow Eval: Clinical Impressions   Skilled Criteria for Therapy Intervention Skilled criteria met.  Treatment indicated.   Functional Assessment Scale (FAS) 3   Dysphagia Outcome Severity Scale (CHARY) Level 3 - CHARY   Diet texture recommendations Dysphagia diet level 3;Honey thick liquids   Recommended Feeding/Eating Techniques alternate between small bites and sips of food/liquid;maintain upright posture during/after eating for 30 mins;check mouth frequently for oral residue/pocketing;no straws;small sips/bites   Therapy Frequency 5 times/wk   Predicted Duration of Therapy Intervention (days/wks) 1 week   Anticipated Discharge Disposition home w/ home health   Risks and Benefits of Treatment have been explained. Yes   Patient, family and/or staff in agreement with Plan of Care Yes   Clinical Impression Comments Patient presents with moderate oral and pharyngeal dysphagia characterized by reduced bolus control, premature spillage to the pyriform sinuses with jean aspiration of thin liquids via the cup with an immediate cough response. Mild flash penetration by the spoon. Deep laryngeal penetration of nectar thick liquids by the cup to  the bottom of the cords and then aspirated, with an immediate cough response. No  penertationoraspiration via the spoon. Prolonged mastication of semi-solids and solids with a delay to the valleculae and mild BOT residue, otherwise normal. Patient is at a risk for aspiration with thin and nectar thick liquds via the cup. Recommend: 1. DDL 3 with honey thick liquids. 2. Up in a chair for meals, small bites/sips, double swallow and or alternate liquids/solids. If he does not like the honey thick liquids can back to nectar but must use a spoon only.    Total Evaluation Time   Total Evaluation Time (Minutes) 15

## 2018-10-15 NOTE — PROGRESS NOTES
Hospitalist Progress Note    Date of Service: 10/15/2018         Assessment and Plan:       Ron Gilmore is a 78 year old male who was admitted to [facility] on 10/12/18 for further treatment of shortness of breath.    Acute hypoxic and hypercapnic respiratory failure  Community-acquired pneumonia  Chronic obstructive pulmonary disease  Assessment: presenting with two day hx of SOB following carpet at his residence being cleaned. CXR showed patchy bilateral perihilar and basilar opacities. Likely has CAP. No wheezing appreciated. Likely has component of COPD exacerbation as well given SOB occurred after carpet cleaned. Was hypoxic with sats in 80s in ED prior to supplemental O2. Troponin wnl, no chest pain present thus doubt ACS, Pro BNP wnl. WBC wnl. Patient clinically improving with O2 and antibiotics. Do have some concern of aspiration. BC and UC positive for GPC and GNR (E coli) respectively.  Plan:  - Switch to Zosyn for anaerobic coverage  - ID has been consulted  - Supplemental O2 as needed, goal > 88%, wean as tolerates  - Follow vitals/temp  - PT/OT/SW  - Duonebs as needed  - Prednisone 40 mg daily, 5 day burst    Dysphagia  Assessment: patient reports coughing with food intake. SLP evaluated, concern for aspiration. Video swallow showed moderate oral and pharyngeal dysphagia characterized by reduced bolus control, premature spillage to the pyriform sinuses with jean aspiration of thin liquids via the cup with an immediate cough response.   Plan:  - Dysphagia diet per SLP  - Speech therapy as outpatient with home health SLP     Suspected sleep apnea  Assessment: Noted in past admissions that he had an apneic spell.  It was recommended that he have an outpatient sleep study  Plan:   - continue to recommend sleep study at discharge     Type 2 diabetes  Assessment: diet controlled. Has not had a hemoglobin A1c checked since 2006.   Plan:  - noted     Urinary Retention  Benign prostatic  hypertrophy  Assessment: Chirinos catheter in place and PTA on Tamsulosin. UA showed Large LETs and > 182 WBC. Doubt this is true UTI given his chronic chirinos and patient is already on ceftriaxone  Plan:  - Continue prior to admission tamsulosin.      Depression  Assessment/Plan: Continue prior to admission paroxetine.      Morbid obesity  Assessment: encouraged weight loss. BMI 40.69      Active Diet Order      Combination Diet Dysphagia Diet Level 3: Advanced; Honey Thickened Liquids (pre-thickened or use instant food thickener)    DVT Prophylaxis: Pneumatic Compression Devices  Code Status: Full Code    Disposition: Expected discharge in 1-2 days    Attestation:   I have reviewed today's relevant vital signs, notes, medications, labs and imaging.I personally reviewed no images or EKG's today.    Sandro Maradiaga MD  [facility]  Text Page  (7am - 6pm)        Interval History:        Patient seen and examined  Video swallow showed aspiration  No CP/SOB  No fevers or chills  No new complaints today, reports that he will be compliant with diet instructions         Physical Exam:        Physical Exam   Temp: 96  F (35.6  C) Temp src: Oral BP: 140/75   Heart Rate: 68 Resp: 18 SpO2: 93 % O2 Device: None (Room air)    Vitals:    10/14/18 1000 10/15/18 0545 10/15/18 0900   Weight: 127.3 kg (280 lb 9.6 oz) 126.1 kg (278 lb 1.6 oz) 126.7 kg (279 lb 4.8 oz)     Vital Signs with Ranges  Temp:  [96  F (35.6  C)-98.2  F (36.8  C)] 96  F (35.6  C)  Heart Rate:  [67-72] 68  Resp:  [18] 18  BP: (130-154)/(69-83) 140/75  SpO2:  [90 %-94 %] 93 %  I/O last 3 completed shifts:  In: -   Out: 1150 [Urine:1150]    Constitutional:Awake, alert, cooperative, no apparent distress  Respiratory: bibasilar crackles, no wheezing, normal respiratory effort  Cardiovascular: Regular rate and rhythm, normal S1 and S2, and no murmur noted  GI: Normal bowel sounds, soft, non-distended, non-tender  Skin/Integumen: No rashes, no cyanosis, no  edema  Other: normal mood and affect  Neuro: A/Ox3. Moving all extremities, speech fluent    # Pain Assessment:  Current Pain Score 10/15/2018   Patient currently in pain? denies   Pain score (0-10) -   Pain location -   Pain descriptors -   Ron castro pain level was assessed and he currently denies pain.           Data:     CBC RESULTS:    Recent Labs  Lab 10/15/18  0738 10/13/18  0705 10/12/18  2032 10/12/18  0035   WBC  --  7.6 8.2  --    RBC  --  3.78* 3.76*  --    HGB  --  11.0* 10.9*  --    HCT  --  34.9* 34.5*  --     181 190 181       BASIC METABOLIC PANEL:    Recent Labs  Lab 10/13/18  0705 10/12/18  2032    141   POTASSIUM 4.3 3.8   CHLORIDE 107 107   CO2 31 29   BUN 18 18   CR 0.83 0.74   * 127*   RAJ 7.8* 7.2*       HEPATIC FUNCTION PANEL    Recent Labs  Lab 10/12/18  2032   AST 22   ALT 34   ALKPHOS 57   BILITOTAL 0.5     INR  No results for input(s): INR in the last 168 hours.    OTHER LABS    Recent Labs  Lab 10/15/18  0738 10/13/18  0705 10/12/18  2032   WBC  --  7.6 8.2   HGB  --  11.0* 10.9*   HCT  --  34.9* 34.5*   MCV  --  92 92    181 190       Recent Labs  Lab 10/13/18  0705 10/12/18  2032    141   POTASSIUM 4.3 3.8   CHLORIDE 107 107   CO2 31 29   ANIONGAP 2* 5   * 127*   BUN 18 18   CR 0.83 0.74   GFRESTIMATED >90 >90   GFRESTBLACK >90 >90   RAJ 7.8* 7.2*       Medications       aspirin chewable tablet 81 mg  81 mg Oral Daily     heparin  5,000 Units Subcutaneous Q12H     metoprolol tartrate (LOPRESSOR) half-tab 12.5 mg  12.5 mg Oral BID     PARoxetine (PAXIL) tablet 10 mg  10 mg Oral Daily     piperacillin-tazobactam  3.375 g Intravenous Q6H     predniSONE  40 mg Oral Daily     sodium chloride (PF)  3 mL Intracatheter Q8H     tamsulosin  0.4 mg Oral BID         Recent Results (from the past 24 hour(s))   XR Video Swallow w/o Esophagram    Narrative    VIDEO SWALLOWING EVALUATION   10/15/2018 10:44 AM     HISTORY:  Suspect aspiration.     COMPARISON:  None.    FLUOROSCOPY TIME: 1.4 minutes.  SPOT IMAGES OR CINE RUNS: 10    FINDINGS:    Thin: Aspiration.    Nectar: Aspiration.    Honey: No penetration or aspiration.    Pudding: No penetration or aspiration.    Semisolid: No penetration or aspiration.    Solid: No penetration or aspiration.    This study only includes the cervical esophagus.    NADYA BONILLA MD

## 2018-10-15 NOTE — PLAN OF CARE
Problem: Patient Care Overview  Goal: Plan of Care/Patient Progress Review  Discharge Planner SLP   Patient plan for discharge: Wants to go home when stable.   Current status: Video swallow study completed. Patient presents with moderate oral and pharyngeal dysphagia characterized by reduced bolus control, premature spillage to the pyriform sinuses with jean aspiration of thin liquids via the cup with an immediate cough response. Mild flash penetration by the spoon. Deep laryngeal penetration of nectar thick liquids by the cup to the bottom of the cords and then aspirated, with an immediate cough response. No  penertationoraspiration via the spoon. Prolonged mastication of semi-solids and solids with a delay to the valleculae and mild BOT residue, otherwise normal. Patient is at a risk for aspiration with thin and nectar thick liquds via the cup. Recommend: 1. DDL 3 with honey thick liquids. 2. Up in a chair for meals, small bites/sips, double swallow and or alternate liquids/solids. If he does not like the honey thick liquids can back to nectar but must use a spoon only.    Barriers to return to prior living situation: None per speech perspective.   Recommendations for discharge: Home with home health ST for swallowing.   Rationale for recommendations: Patient is not at his baseline diet and would benefit from ST at home for free water protocol and advancement as tolerated.        Entered by: Saritha Castro 10/15/2018 11:35 AM

## 2018-10-15 NOTE — CONSULTS
[facility]    Infectious Disease Consultation     Date of Admission:  10/12/2018  Date of Consult (When I saw the patient): 10/15/18    Assessment & Plan   Ron Gilmore is a 78 year old male who was admitted on 10/12/2018.     Impression:  1. 78 y.o male admitted with dysuria.   2. E coli in the urine culture, with grossly abnormal urine analysis.   3. GPC in the blood most likely a contaminant.   4. Currently on zosyn.     Recommendations:   Continue on zosyn   Follow up on the ZION           Elliott Clemons MD    Reason for Consult   Reason for consult: I was asked by Dr. Maradiaga to evaluate this patient for UTI.    Primary Care Physician   Augustin Thomas    Chief Complaint   Dysuria  Fever     History is obtained from the patient and medical records    History of Present Illness    [facility] [place]  Ron Gilmore is a 78 year old male who presented with pain with urination. The patient notes his indwelling chirinos catheter has not been changed since last month, placed for urinary retention. Since then the catheter has been taken out.   At baseline patient ambulates with a motorized wheelchair. Patient otherwise denies any chills, cough, chest pain, nausea, vomiting, or abdominal pain. He has no other complaints at this time.        Past Medical History   I have reviewed this patient's medical history and updated it with pertinent information if needed.   Past Medical History:   Diagnosis Date     BPH (benign prostatic hyperplasia)      Cataract      Cholelithiasis      COPD (chronic obstructive pulmonary disease) (H)      Depression      Diabetes mellitus     Type 2     Dyshidrotic foot dermatitis      Edema      Gout      Hyperlipidemia      Hypertension      Kidney stones     Bladder Stones     Lumbago      Lumbar disc displacement without myelopathy      Muscle weakness      Neuropathy, diabetic (H)      Obesity      Spinal stenosis      Stroke (H)     with residual effects- weakness LUE> LLE      Unsteady gait      Urinary retention with incomplete bladder emptying      UTI (urinary tract infection)        Past Surgical History   I have reviewed this patient's surgical history and updated it with pertinent information if needed.  Past Surgical History:   Procedure Laterality Date     APPENDECTOMY OPEN       ARTHROSCOPY SHOULDER ROTATOR CUFF REPAIR       cataracts Bilateral      CHOLECYSTECTOMY       COLONOSCOPY       CYSTOSCOPY  10/19/2011    Procedure:CYSTOSCOPY; CYSTOSCOPY, BLADDER STONE REMOVAL; Surgeon:ROB SAWYER; Location: OR     CYSTOSCOPY, TRANSURETHRAL RESECTION (TUR) PROSTATE, COMBINED N/A 2/21/2018    Procedure: COMBINED CYSTOSCOPY, TRANSURETHRAL RESECTION (TUR) PROSTATE;  COMBINED CYSTOSCOPY, TRANSURETHRAL RESECTION (TUR) PROSTATE ;  Surgeon: Rob Sawyer MD;  Location:  OR     EYE SURGERY      right lid surgery      JOINT REPLACEMENT Right     HIP     KNEE SURGERY Bilateral      LAMINECTOMY LUMBAR ONE LEVEL       TONSILLECTOMY         Prior to Admission Medications   Prior to Admission Medications   Prescriptions Last Dose Informant Patient Reported? Taking?   ALLOPURINOL PO 10/12/2018 at am Self Yes Yes   Sig: Take 300 mg by mouth daily   ASPIRIN PO 10/12/2018 at am Self Yes Yes   Sig: Take 81 mg by mouth daily   Emollient (AMLACTIN ULTRA EX) Past Week at Unknown time Self Yes Yes   Sig: Externally apply topically daily APPLY TO FEET    Metoprolol Tartrate (LOPRESSOR PO) 10/12/2018 at am Self Yes Yes   Sig: Take 12.5 mg by mouth 2 times daily (0.5 x 25 mg tablet = 12.5 mg dose)   PARoxetine HCl (PAXIL PO) 10/12/2018 at am Self Yes Yes   Sig: Take 10 mg by mouth daily   albuterol (2.5 MG/3ML) 0.083% neb solution prn Self No Yes   Sig: Take 1 vial (2.5 mg) by nebulization every 2 hours as needed for shortness of breath / dyspnea or other (dyspnea)   fluocinonide-emollient (LIDEX-E) 0.05 % cream prn Self Yes Yes   Sig: Apply topically 2 times daily as needed   order for DME  Self No  No   Sig: Equipment being ordered: Other: Home nebulizer  Treatment Diagnosis: COPD/Pneumonia   tamsulosin (FLOMAX) 0.4 MG 24 hr capsule 10/12/2018 at am Self Yes Yes   Sig: Take 0.4 mg by mouth 2 times daily       Facility-Administered Medications: None     Allergies   No Known Allergies    Immunization History     There is no immunization history on file for this patient.    Social History   I have reviewed this patient's social history and updated it with pertinent information if needed. Ron Gilmore  reports that he has quit smoking. He has never used smokeless tobacco. He reports that he does not drink alcohol or use illicit drugs.    Family History   I have reviewed this patient's family history and updated it with pertinent information if needed.   Family History   Problem Relation Age of Onset     Prostate Cancer Father        Review of Systems   The 10 point Review of Systems is negative other than noted in the HPI or here.     Physical Exam   Temp: 96.7  F (35.9  C) Temp src: Oral BP: 154/83   Heart Rate: 69 Resp: 18 SpO2: 90 % O2 Device: None (Room air) Oxygen Delivery: 1 LPM  Vital Signs with Ranges  Temp:  [96.5  F (35.8  C)-98.2  F (36.8  C)] 96.7  F (35.9  C)  Heart Rate:  [67-72] 69  Resp:  [18] 18  BP: (130-154)/(69-83) 154/83  SpO2:  [90 %-97 %] 90 %  279 lbs 4.8 oz  Body mass index is 37.88 kg/(m^2).    GENERAL APPEARANCE:  In NAD   EYES: Eyes grossly normal to inspection, PERRL and conjunctivae and sclerae normal  HENT: ear canals and TM's normal and nose and mouth without ulcers or lesions  NECK: no adenopathy, no asymmetry, masses, or scars and thyroid normal to palpation  RESP: lungs clear to auscultation - no rales, rhonchi or wheezes  CV: regular rates and rhythm, normal S1 S2, no S3 or S4 and no murmur, click or rub  LYMPHATICS: normal ant/post cervical and supraclavicular nodes  ABDOMEN: soft, nontender, without hepatosplenomegaly or masses and bowel sounds normal  MS: extremities  normal- no gross deformities noted  SKIN: no suspicious lesions or rashes      Data   Lab Results   Component Value Date    WBC 7.6 10/13/2018    HGB 11.0 (L) 10/13/2018    HCT 34.9 (L) 10/13/2018     10/15/2018     10/13/2018    POTASSIUM 4.3 10/13/2018    CHLORIDE 107 10/13/2018    CO2 31 10/13/2018    BUN 18 10/13/2018    CR 0.83 10/13/2018     (H) 10/13/2018    SED 5 07/13/2006    NTBNPI 601 10/12/2018    TROPI <0.015 10/12/2018    AST 22 10/12/2018    ALT 34 10/12/2018    ALKPHOS 57 10/12/2018    BILITOTAL 0.5 10/12/2018    INR 1.01 07/13/2006       Recent Labs  Lab 10/12/18  2203 10/12/18  2120 10/12/18  2057   CULT >100,000 colonies/mLEscherichia coliSusceptibility testing in progress*  <10,000 colonies/mLurogenital floraSusceptibility testing not routinely done Cultured on the 1st day of incubation:Gram positive cocci in clusters*  Critical Value/Significant Value, preliminary result only, called to and read back byJennifer Blanchard RN on 10/14/18 at 1008 ac.  (Note)POSITIVE for Staphylococci other than S.aureus, S.epidermidis andS.lugdunensis, by Verigene multiplex nucleic acid test.Coagulase-negative staphylococci are the most common venipuncture orcollection associated skin CONTAMINANTS grown in blood cultures.Final identification and antimicrobial susceptibility testing will beverified by standard methods.Specimen tested with Verigene multiplex, gram-positive blood culturenucleic acid test for the following targets: Staph aureus, Staphepidermidis, Staph lugdunensis, other Staph species, Enterococcusfaecalis, Enterococcus faecium, Streptococcus species, S. agalactiae,S. anginosus grp., S. pneumoniae, S. pyogenes, Listeria sp., mecA(methicillin resistance) and Roosevelt/B (vancomycin resistance).Critical Value/Significant Value called to and read back by Rafal BLACK ([email or web address]) on 10.14.18 @13:11PM by DW. No growth after 2 days     Recent Labs   Lab Test  10/12/18   2203  10/12/18   2120  10/12/18    2057  09/15/18   1205  03/14/18   2354  10/05/17   0358  10/04/17   1312  06/16/17   2104  06/13/17   0425   CULT  >100,000 colonies/mL  Escherichia coli  Susceptibility testing in progress  *  <10,000 colonies/mL  urogenital patty  Susceptibility testing not routinely done    Cultured on the 1st day of incubation:  Gram positive cocci in clusters  *  Critical Value/Significant Value, preliminary result only, called to and read back by  Jennifer Blanchard RN on 10/14/18 at 1008 ac.    (Note)  POSITIVE for Staphylococci other than S.aureus, S.epidermidis and  S.lugdunensis, by Toroleoigene multiplex nucleic acid test.  Coagulase-negative staphylococci are the most common venipuncture or  collection associated skin CONTAMINANTS grown in blood cultures.  Final identification and antimicrobial susceptibility testing will be  verified by standard methods.    Specimen tested with Verigene multiplex, gram-positive blood culture  nucleic acid test for the following targets: Staph aureus, Staph  epidermidis, Staph lugdunensis, other Staph species, Enterococcus  faecalis, Enterococcus faecium, Streptococcus species, S. agalactiae,  S. anginosus grp., S. pneumoniae, S. pyogenes, Listeria sp., mecA  (methicillin resistance) and Roosevelt/B (vancomycin resistance).    Critical Value/Significant Value called to and read back by Jennifer Blanchard RN ([id number]) on 10.14.18 @13:11PM by DW.              No growth after 2 days  >100,000 colonies/mL  Enterococcus faecium (VRE)  *  10,000 to 50,000 colonies/mL  Candida tropicalis  Susceptibility testing not routinely done  *  >100,000 colonies/mL  Citrobacter freundii  *  >100,000 colonies/mL  Enterococcus faecalis  *  Susceptibility testing done on previous specimen  >100,000 colonies/mL  Enterococcus faecalis  *  Moderate growth Normal patty  No growth

## 2018-10-16 ENCOUNTER — APPOINTMENT (OUTPATIENT)
Dept: SPEECH THERAPY | Facility: CLINIC | Age: 78
DRG: 193 | End: 2018-10-16
Payer: COMMERCIAL

## 2018-10-16 LAB
BACTERIA SPEC CULT: ABNORMAL
BACTERIA SPEC CULT: ABNORMAL
Lab: ABNORMAL
SPECIMEN SOURCE: ABNORMAL

## 2018-10-16 PROCEDURE — 25000128 H RX IP 250 OP 636: Performed by: STUDENT IN AN ORGANIZED HEALTH CARE EDUCATION/TRAINING PROGRAM

## 2018-10-16 PROCEDURE — 25000132 ZZH RX MED GY IP 250 OP 250 PS 637: Performed by: INTERNAL MEDICINE

## 2018-10-16 PROCEDURE — 92526 ORAL FUNCTION THERAPY: CPT | Mod: GN | Performed by: SPEECH-LANGUAGE PATHOLOGIST

## 2018-10-16 PROCEDURE — 25000132 ZZH RX MED GY IP 250 OP 250 PS 637: Performed by: STUDENT IN AN ORGANIZED HEALTH CARE EDUCATION/TRAINING PROGRAM

## 2018-10-16 PROCEDURE — 25000125 ZZHC RX 250: Performed by: STUDENT IN AN ORGANIZED HEALTH CARE EDUCATION/TRAINING PROGRAM

## 2018-10-16 PROCEDURE — 12000000 ZZH R&B MED SURG/OB

## 2018-10-16 PROCEDURE — 40000225 ZZH STATISTIC SLP WARD VISIT: Performed by: SPEECH-LANGUAGE PATHOLOGIST

## 2018-10-16 PROCEDURE — 99232 SBSQ HOSP IP/OBS MODERATE 35: CPT | Performed by: STUDENT IN AN ORGANIZED HEALTH CARE EDUCATION/TRAINING PROGRAM

## 2018-10-16 RX ORDER — NYSTATIN 100000/ML
500000 SUSPENSION, ORAL (FINAL DOSE FORM) ORAL 4 TIMES DAILY
Status: DISCONTINUED | OUTPATIENT
Start: 2018-10-16 | End: 2018-10-17 | Stop reason: HOSPADM

## 2018-10-16 RX ORDER — CEFIXIME 400 MG/1
400 CAPSULE ORAL DAILY
Status: DISCONTINUED | OUTPATIENT
Start: 2018-10-16 | End: 2018-10-17 | Stop reason: HOSPADM

## 2018-10-16 RX ADMIN — TAMSULOSIN HYDROCHLORIDE 0.4 MG: 0.4 CAPSULE ORAL at 08:14

## 2018-10-16 RX ADMIN — Medication 12.5 MG: at 20:34

## 2018-10-16 RX ADMIN — ASPIRIN 81 MG 81 MG: 81 TABLET ORAL at 08:14

## 2018-10-16 RX ADMIN — NYSTATIN 500000 UNITS: 100000 SUSPENSION ORAL at 12:08

## 2018-10-16 RX ADMIN — TAMSULOSIN HYDROCHLORIDE 0.4 MG: 0.4 CAPSULE ORAL at 20:35

## 2018-10-16 RX ADMIN — NYSTATIN 500000 UNITS: 100000 SUSPENSION ORAL at 18:23

## 2018-10-16 RX ADMIN — PAROXETINE HYDROCHLORIDE 10 MG: 10 TABLET, FILM COATED ORAL at 08:14

## 2018-10-16 RX ADMIN — PIPERACILLIN SODIUM,TAZOBACTAM SODIUM 3.38 G: 3; .375 INJECTION, POWDER, FOR SOLUTION INTRAVENOUS at 01:46

## 2018-10-16 RX ADMIN — PREDNISONE 40 MG: 20 TABLET ORAL at 08:14

## 2018-10-16 RX ADMIN — CEFIXIME 400 MG: 400 CAPSULE ORAL at 12:08

## 2018-10-16 RX ADMIN — HEPARIN SODIUM 5000 UNITS: 5000 INJECTION, SOLUTION INTRAVENOUS; SUBCUTANEOUS at 08:14

## 2018-10-16 RX ADMIN — Medication 12.5 MG: at 08:14

## 2018-10-16 RX ADMIN — HEPARIN SODIUM 5000 UNITS: 5000 INJECTION, SOLUTION INTRAVENOUS; SUBCUTANEOUS at 20:35

## 2018-10-16 RX ADMIN — PIPERACILLIN SODIUM,TAZOBACTAM SODIUM 3.38 G: 3; .375 INJECTION, POWDER, FOR SOLUTION INTRAVENOUS at 08:10

## 2018-10-16 ASSESSMENT — ACTIVITIES OF DAILY LIVING (ADL)
ADLS_ACUITY_SCORE: 26
ADLS_ACUITY_SCORE: 28
ADLS_ACUITY_SCORE: 26
ADLS_ACUITY_SCORE: 26
ADLS_ACUITY_SCORE: 28
ADLS_ACUITY_SCORE: 28

## 2018-10-16 NOTE — PLAN OF CARE
Problem: Pneumonia (Adult)  Goal: Signs and Symptoms of Listed Potential Problems Will be Absent, Minimized or Managed (Pneumonia)  Signs and symptoms of listed potential problems will be absent, minimized or managed by discharge/transition of care (reference Pneumonia (Adult) CPG).   Outcome: No Change  VSS, desats w/ activity. A&O. Up w/ pivot to chair or lift. DD3 diet w/ nectar thickened liquids, good appetite. Voiding per urinal, large BM this evening. Continuing IV abx, dc pending BC/UC sensitivities. Will continue to monitor.

## 2018-10-16 NOTE — PROGRESS NOTES
Hospitalist Progress Note    Date of Service: 10/16/2018         Assessment and Plan:       Ron Gilmore is a 78 year old male who was admitted to [facility] on 10/12/18 for further treatment of shortness of breath.    Acute hypoxic and hypercapnic respiratory failure  Community-acquired pneumonia  Chronic obstructive pulmonary disease  Assessment: presenting with two day hx of SOB following carpet at his residence being cleaned. CXR showed patchy bilateral perihilar and basilar opacities. Likely has CAP. No wheezing appreciated. Likely has component of COPD exacerbation as well given SOB occurred after carpet cleaned. Was hypoxic with sats in 80s in ED prior to supplemental O2. Troponin wnl, no chest pain present thus doubt ACS, Pro BNP wnl. WBC wnl. Patient clinically improving with O2 and antibiotics. Do have some concern of aspiration, thus switched from Ceftriaxone and Azithromycin to Zosyn. BC positive for GPC. ID consulted, GPC in blood culture is likely contaminant.  Plan:  - Supplemental O2 as needed, goal > 88%, wean as tolerates  - Follow vitals/temp  - PT/OT/SW  - Duonebs as needed  - Prednisone 40 mg daily, 5 day burst (day 4/5), last dose 10/17    UTI  Assessment: UA showed Large LETs and > 182 WBC. UC positive for E coli intermediate with sensitivity to Zosyn, switched to Suprax.  Plan:  - Suprax for 14 days per ID  - ID following consulted    Dysphagia  Assessment: patient reports coughing with food intake. SLP evaluated, concern for aspiration. Video swallow showed moderate oral and pharyngeal dysphagia characterized by reduced bolus control, premature spillage to the pyriform sinuses with jean aspiration of thin liquids via the cup with an immediate cough response.   Plan:  - Dysphagia diet per SLP  - Speech therapy as outpatient with home health SLP     Suspected sleep apnea  Assessment: Noted in past admissions that he had an apneic spell.  It was recommended that he have  an outpatient sleep study  Plan:   - continue to recommend sleep study at discharge     Type 2 diabetes  Assessment: diet controlled. Has not had a hemoglobin A1c checked since 2006.   Plan:  - noted     Urinary Retention  Benign prostatic hypertrophy  Assessment: Cardona catheter in place and PTA on Tamsulosin. Cardona catheter was pulled while in ED.  Plan:  - Continue prior to admission tamsulosin.      Depression  Assessment/Plan: Continue prior to admission paroxetine.      Morbid obesity  Assessment: encouraged weight loss. BMI 40.69      Active Diet Order      Combination Diet Dysphagia Diet Level 3: Advanced; Honey Thickened Liquids (pre-thickened or use instant food thickener)    DVT Prophylaxis: Pneumatic Compression Devices  Code Status: Full Code    Disposition: Expected discharge in 1-2 days    Attestation:   I have reviewed today's relevant vital signs, notes, medications, labs and imaging.I personally reviewed no images or EKG's today.    Sandro Maradiaga MD  [facility]  Text Page  (7am - 6pm)        Interval History:        Patient seen and examined  No aspiration events  No SOB/cough/fever, remains off oxygen  No new complaints         Physical Exam:        Physical Exam   Temp: 97  F (36.1  C) Temp src: Oral BP: 154/85 Pulse: 70 Heart Rate: 65 Resp: 18 SpO2: 100 % O2 Device: None (Room air)    Vitals:    10/14/18 1000 10/15/18 0545 10/15/18 0900   Weight: 127.3 kg (280 lb 9.6 oz) 126.1 kg (278 lb 1.6 oz) 126.7 kg (279 lb 4.8 oz)     Vital Signs with Ranges  Temp:  [96.3  F (35.7  C)-97.4  F (36.3  C)] 97  F (36.1  C)  Pulse:  [70] 70  Heart Rate:  [] 65  Resp:  [18-20] 18  BP: (141-155)/(69-85) 154/85  SpO2:  [84 %-100 %] 100 %  I/O last 3 completed shifts:  In: 528 [P.O.:120; I.V.:408]  Out: 1075 [Urine:1075]    Constitutional:Awake, alert, cooperative, no apparent distress  Respiratory: bibasilar crackles, no wheezing, normal respiratory effort  Cardiovascular: Regular rate and  rhythm, normal S1 and S2, and no murmur noted  GI: Normal bowel sounds, soft, non-distended, non-tender  Skin/Integumen: No rashes, no cyanosis, Trace pedal edema  Other: normal mood and affect  Neuro: A/Ox3. Moving all extremities, speech fluent    # Pain Assessment:  Current Pain Score 10/16/2018   Patient currently in pain? denies   Pain score (0-10) -   Pain location -   Pain descriptors -   Ron castro pain level was assessed and he currently denies pain.           Data:     CBC RESULTS:    Recent Labs  Lab 10/15/18  0738 10/13/18  0705 10/12/18  2032 10/12/18  0035   WBC  --  7.6 8.2  --    RBC  --  3.78* 3.76*  --    HGB  --  11.0* 10.9*  --    HCT  --  34.9* 34.5*  --     181 190 181       BASIC METABOLIC PANEL:    Recent Labs  Lab 10/13/18  0705 10/12/18  2032    141   POTASSIUM 4.3 3.8   CHLORIDE 107 107   CO2 31 29   BUN 18 18   CR 0.83 0.74   * 127*   RAJ 7.8* 7.2*       HEPATIC FUNCTION PANEL    Recent Labs  Lab 10/12/18  2032   AST 22   ALT 34   ALKPHOS 57   BILITOTAL 0.5     INR  No results for input(s): INR in the last 168 hours.    OTHER LABS    Recent Labs  Lab 10/15/18  0738 10/13/18  0705 10/12/18  2032   WBC  --  7.6 8.2   HGB  --  11.0* 10.9*   HCT  --  34.9* 34.5*   MCV  --  92 92    181 190       Recent Labs  Lab 10/13/18  0705 10/12/18  2032    141   POTASSIUM 4.3 3.8   CHLORIDE 107 107   CO2 31 29   ANIONGAP 2* 5   * 127*   BUN 18 18   CR 0.83 0.74   GFRESTIMATED >90 >90   GFRESTBLACK >90 >90   RAJ 7.8* 7.2*       Medications       aspirin chewable tablet 81 mg  81 mg Oral Daily     Cefixime  400 mg Oral Daily     heparin  5,000 Units Subcutaneous Q12H     metoprolol tartrate (LOPRESSOR) half-tab 12.5 mg  12.5 mg Oral BID     nystatin  500,000 Units Oral 4x Daily     PARoxetine (PAXIL) tablet 10 mg  10 mg Oral Daily     predniSONE  40 mg Oral Daily     sodium chloride (PF)  3 mL Intracatheter Q8H     tamsulosin  0.4 mg Oral BID         No results found  for this or any previous visit (from the past 24 hour(s)).

## 2018-10-16 NOTE — PLAN OF CARE
Problem: Pneumonia (Adult)  Goal: Signs and Symptoms of Listed Potential Problems Will be Absent, Minimized or Managed (Pneumonia)  Signs and symptoms of listed potential problems will be absent, minimized or managed by discharge/transition of care (reference Pneumonia (Adult) CPG).   Outcome: No Change  A&Ox4. Denies pain. VSS on RA. Infrequent congested/nonproductive cough. Blanchable pink coccyx, frequent repositioning. DD3/honey thick diet per speech. Voiding per urinal, UOP adequate. Stool x 2, incontinent once. L PIV SL. Up w/ lift, L-sided weakness baseline.

## 2018-10-16 NOTE — PROGRESS NOTES
[facility]    Infectious Disease Progress Note    Date of Service (when I saw the patient): 10/16/2018     Assessment & Plan   Ron Gilmore is a 78 year old male who was admitted on 10/12/2018.     Impression:  1. 78 y.o male admitted with dysuria.   2. E coli in the urine culture, with grossly abnormal urine analysis.   3. GPC in the blood most likely a contaminant.   4. Currently on zosyn.   5. Oral thrush.      Recommendations:   E coli in the urine only intermediate sensitive to zosyn, but patient clinically improved, catheter is now out.   Will switch to suprax oral for 14 days.   Nystatin for oral thrush.             Elliott Clemons MD    Interval History   Feels better overall even though had a rough night yesterday, was woken up many times    Physical Exam   Temp: 96.9  F (36.1  C) Temp src: Oral BP: 152/73 Pulse: 70 Heart Rate: 55 Resp: 20 SpO2: 91 % O2 Device: Nasal cannula    Vitals:    10/14/18 1000 10/15/18 0545 10/15/18 0900   Weight: 127.3 kg (280 lb 9.6 oz) 126.1 kg (278 lb 1.6 oz) 126.7 kg (279 lb 4.8 oz)     Vital Signs with Ranges  Temp:  [96  F (35.6  C)-97.4  F (36.3  C)] 96.9  F (36.1  C)  Pulse:  [70] 70  Heart Rate:  [] 55  Resp:  [18-20] 20  BP: (140-155)/(69-82) 152/73  SpO2:  [84 %-97 %] 91 %    Constitutional: Awake, alert, cooperative, no apparent distress  Lungs: Clear to auscultation bilaterally, no crackles or wheezing  Cardiovascular: Regular rate and rhythm, normal S1 and S2, and no murmur noted  Abdomen: Normal bowel sounds, soft, non-distended, non-tender  Skin: No rashes, no cyanosis, no edema  Other:    Medications       aspirin chewable tablet 81 mg  81 mg Oral Daily     Cefixime  400 mg Oral Daily     heparin  5,000 Units Subcutaneous Q12H     metoprolol tartrate (LOPRESSOR) half-tab 12.5 mg  12.5 mg Oral BID     PARoxetine (PAXIL) tablet 10 mg  10 mg Oral Daily     predniSONE  40 mg Oral Daily     sodium chloride (PF)  3 mL Intracatheter Q8H      tamsulosin  0.4 mg Oral BID       Data   All microbiology laboratory data reviewed.  Recent Labs   Lab Test  10/15/18   0738  10/13/18   0705  10/12/18   2032   02/21/18   1116  10/05/17   0431   WBC   --   7.6  8.2   --    --   9.5   HGB   --   11.0*  10.9*   --   16.3  13.9   HCT   --   34.9*  34.5*   --    --   43.3   MCV   --   92  92   --    --   93   PLT  193  181  190   < >   --   164    < > = values in this interval not displayed.     Recent Labs   Lab Test  10/13/18   0705  10/12/18   2032  02/21/18   1116   CR  0.83  0.74  0.78     No lab results found.  Recent Labs   Lab Test  10/12/18   2203  10/12/18   2120  10/12/18   2057  09/15/18   1205  03/14/18   2354  10/05/17   0358  10/04/17   1312  06/16/17   2104  06/13/17   0425   CULT  >100,000 colonies/mL  Escherichia coli  *  <10,000 colonies/mL  urogenital patty  Susceptibility testing not routinely done    Cultured on the 1st day of incubation:  Coagulase negative Staphylococcus  Speciation in progress  *  Critical Value/Significant Value, preliminary result only, called to and read back by  Jennifer Blanchard RN on 10/14/18 at 1008 ac.    (Note)  POSITIVE for Staphylococci other than S.aureus, S.epidermidis and  S.lugdunensis, by Verigene multiplex nucleic acid test.  Coagulase-negative staphylococci are the most common venipuncture or  collection associated skin CONTAMINANTS grown in blood cultures.  Final identification and antimicrobial susceptibility testing will be  verified by standard methods.    Specimen tested with Verigene multiplex, gram-positive blood culture  nucleic acid test for the following targets: Staph aureus, Staph  epidermidis, Staph lugdunensis, other Staph species, Enterococcus  faecalis, Enterococcus faecium, Streptococcus species, S. agalactiae,  S. anginosus grp., S. pneumoniae, S. pyogenes, Listeria sp., mecA  (methicillin resistance) and Roosevelt/B (vancomycin resistance).    Critical Value/Significant Value called to and read back  by Jennifer Blanchard RN ([email or web address]) on 10.14.18 @13:11PM by TOMEKA.              No growth after 3 days  >100,000 colonies/mL  Enterococcus faecium (VRE)  *  10,000 to 50,000 colonies/mL  Candida tropicalis  Susceptibility testing not routinely done  *  >100,000 colonies/mL  Citrobacter freundii  *  >100,000 colonies/mL  Enterococcus faecalis  *  Susceptibility testing done on previous specimen  >100,000 colonies/mL  Enterococcus faecalis  *  Moderate growth Normal patty  No growth

## 2018-10-16 NOTE — PLAN OF CARE
Problem: Patient Care Overview  Goal: Plan of Care/Patient Progress Review  Discharge Planner SLP   Patient plan for discharge: Home  Current status: SLP: Patient seen for swallow treatment at noon meal. Patient demonstrated prolonged mastication of the meats with mild oral residue. Coughed x2 with meat secondary to talking while chewing. He did state that the meat was tough( No lower dentures). He was able to tolerate the honey thick liquids by the cup with small single sips without overt Sx of aspiration. Trials of nectar thick liquids resulted in coughing immediately after the swallow. Patient continues to be at risk for aspiration and should be monitored closely. Recommend: Continue on the DDL 3 diet and honey thick liquids. Upright, small sips/bites, double swallow and alternate liquids/solids. SLP will f/u for swallow strategies, pharyngeal strengthening and education to wife if present.   Barriers to return to prior living situation: None  Recommendations for discharge: Home with home health ST for swallowing.   Rationale for recommendations: ST needs at discharge for swallowing to provide education to spouse/patient, swallow strategies, diet modification, advancement possible need to repeat a video swallow study as an OP.        Entered by: Saritha Castro 10/16/2018 3:31 PM

## 2018-10-17 ENCOUNTER — APPOINTMENT (OUTPATIENT)
Dept: SPEECH THERAPY | Facility: CLINIC | Age: 78
DRG: 193 | End: 2018-10-17
Payer: COMMERCIAL

## 2018-10-17 VITALS
TEMPERATURE: 97 F | DIASTOLIC BLOOD PRESSURE: 80 MMHG | WEIGHT: 279.3 LBS | OXYGEN SATURATION: 91 % | SYSTOLIC BLOOD PRESSURE: 120 MMHG | HEART RATE: 70 BPM | HEIGHT: 72 IN | RESPIRATION RATE: 16 BRPM | BODY MASS INDEX: 37.83 KG/M2

## 2018-10-17 LAB
BACTERIA SPEC CULT: ABNORMAL
Lab: ABNORMAL
SPECIMEN SOURCE: ABNORMAL

## 2018-10-17 PROCEDURE — 25000132 ZZH RX MED GY IP 250 OP 250 PS 637: Performed by: STUDENT IN AN ORGANIZED HEALTH CARE EDUCATION/TRAINING PROGRAM

## 2018-10-17 PROCEDURE — 25000125 ZZHC RX 250: Performed by: STUDENT IN AN ORGANIZED HEALTH CARE EDUCATION/TRAINING PROGRAM

## 2018-10-17 PROCEDURE — 40000225 ZZH STATISTIC SLP WARD VISIT: Performed by: SPEECH-LANGUAGE PATHOLOGIST

## 2018-10-17 PROCEDURE — 25000132 ZZH RX MED GY IP 250 OP 250 PS 637: Performed by: INTERNAL MEDICINE

## 2018-10-17 PROCEDURE — 92526 ORAL FUNCTION THERAPY: CPT | Mod: GN | Performed by: SPEECH-LANGUAGE PATHOLOGIST

## 2018-10-17 PROCEDURE — 99239 HOSP IP/OBS DSCHRG MGMT >30: CPT | Performed by: INTERNAL MEDICINE

## 2018-10-17 PROCEDURE — 25000128 H RX IP 250 OP 636: Performed by: STUDENT IN AN ORGANIZED HEALTH CARE EDUCATION/TRAINING PROGRAM

## 2018-10-17 RX ORDER — CEFIXIME 400 MG/1
400 CAPSULE ORAL DAILY
Qty: 14 CAPSULE | Refills: 0 | Status: SHIPPED | OUTPATIENT
Start: 2018-10-18 | End: 2018-11-01

## 2018-10-17 RX ADMIN — NYSTATIN 500000 UNITS: 100000 SUSPENSION ORAL at 12:38

## 2018-10-17 RX ADMIN — PAROXETINE HYDROCHLORIDE 10 MG: 10 TABLET, FILM COATED ORAL at 08:26

## 2018-10-17 RX ADMIN — PREDNISONE 40 MG: 20 TABLET ORAL at 08:26

## 2018-10-17 RX ADMIN — ASPIRIN 81 MG 81 MG: 81 TABLET ORAL at 08:26

## 2018-10-17 RX ADMIN — CEFIXIME 400 MG: 400 CAPSULE ORAL at 08:25

## 2018-10-17 RX ADMIN — Medication 12.5 MG: at 08:26

## 2018-10-17 RX ADMIN — NYSTATIN 500000 UNITS: 100000 SUSPENSION ORAL at 08:26

## 2018-10-17 RX ADMIN — HEPARIN SODIUM 5000 UNITS: 5000 INJECTION, SOLUTION INTRAVENOUS; SUBCUTANEOUS at 08:26

## 2018-10-17 RX ADMIN — TAMSULOSIN HYDROCHLORIDE 0.4 MG: 0.4 CAPSULE ORAL at 08:26

## 2018-10-17 RX ADMIN — NYSTATIN 500000 UNITS: 100000 SUSPENSION ORAL at 00:28

## 2018-10-17 ASSESSMENT — ACTIVITIES OF DAILY LIVING (ADL)
ADLS_ACUITY_SCORE: 24

## 2018-10-17 NOTE — PROGRESS NOTES
Writer text paged SP to have them bring dysphagia handouts for patient to go home with, per day shift nurse from yesterday patients wife wanting information on patients new diet. SP currently at bedside reviewing handouts with patient (liquid consistency and where to buy thickner, foods to avoid and are able to have, etc)

## 2018-10-17 NOTE — DISCHARGE SUMMARY
Discharge Summary    Ron Gilmore MRN# [id number]   YOB: 1940 Age: 78 year old     Date of Admission:  10/12/2018  Date of Discharge:  10/17/2018  Admitting Physician:  Sandro Maradiaga MD  Discharge Physician:  Hitesh Davies MD  Discharging Service:  Hospitalist     Primary Provider: Augustin Thomas          Admission Diagnoses:   Shortness of breath [R06.02]  Hypoxia [R09.02]  Community acquired pneumonia, unspecified laterality [J18.9]          Discharge Diagnosis:   Patient Active Problem List   Diagnosis     SIRS (systemic inflammatory response syndrome) (H)     Vasovagal episode     CAP (community acquired pneumonia)     Tibia/fibula fracture     Closed tibia fracture     Post-operative state     Major depressive disorder, recurrent episode (H)     Urinary retention     COPD (chronic obstructive pulmonary disease) (H)             Condition on Discharge:       Discharge vitals: Blood pressure 120/80, pulse 70, temperature 97  F (36.1  C), temperature source Oral, resp. rate 16, height 1.829 m (6'), weight 126.7 kg (279 lb 4.8 oz), SpO2 91 %.         Gen: Well nourished, well developed, alert and oriented x 3, no acute distressed  HEENT: Atraumatic, normocephalic  Lungs: Clear to ausculation without wheezes, rhonchi, or rales  Heart: Regular rate and rhythm, no murmurs, gallops, or rubs  GI: Bowel sound normal, no hepatosplenomegaly or masses  Lymph: No lymphadenopathy or edema  Skin: No rashes            Procedures / Labs / Imaging:   Most Recent 3 CBC's:  Recent Labs   Lab Test  10/15/18   0738  10/13/18   0705  10/12/18   2032   02/21/18   1116  10/05/17   0431   WBC   --   7.6  8.2   --    --   9.5   HGB   --   11.0*  10.9*   --   16.3  13.9   MCV   --   92  92   --    --   93   PLT  193  181  190   < >   --   164    < > = values in this interval not displayed.      Most Recent 3 BMP's:  Recent Labs   Lab Test  10/13/18   0705  10/12/18   2032  02/21/18   1116  10/05/17   0431   NA   140  141   --   137   POTASSIUM  4.3  3.8  4.4  4.4   CHLORIDE  107  107   --   101   CO2  31  29   --   28   BUN  18  18   --   17   CR  0.83  0.74  0.78  0.92   ANIONGAP  2*  5   --   8   RAJ  7.8*  7.2*   --   8.8   GLC  104*  127*  128*  114*     Most Recent 3 Troponin's:  Recent Labs   Lab Test  10/12/18   2032   TROPI  <0.015     Most Recent 3 INR's:No lab results found.  Most Recent 2 LFT's:  Recent Labs   Lab Test  10/12/18   2032  10/04/17   1400   AST  22  11   ALT  34  19   ALKPHOS  57  83   BILITOTAL  0.5  0.5     Most Recent Cholesterol Panel:No lab results found.  Most Recent 6 Bacteria Isolates From Any Culture (See EPIC Reports for Culture Details):  Recent Labs   Lab Test  10/12/18   2203  10/12/18   2120  10/12/18   2057  09/15/18   1205  03/14/18   2354  10/05/17   0358   CULT  >100,000 colonies/mL  Escherichia coli  *  <10,000 colonies/mL  urogenital patty  Susceptibility testing not routinely done    Cultured on the 1st day of incubation:  Staphylococcus capitis  *  Critical Value/Significant Value, preliminary result only, called to and read back by  Jennifer Blanchard RN on 10/14/18 at 1008 ac.    (Note)  POSITIVE for Staphylococci other than S.aureus, S.epidermidis and  S.lugdunensis, by "[employer]igene multiplex nucleic acid test.  Coagulase-negative staphylococci are the most common venipuncture or  collection associated skin CONTAMINANTS grown in blood cultures.  Final identification and antimicrobial susceptibility testing will be  verified by standard methods.    Specimen tested with Verigene multiplex, gram-positive blood culture  nucleic acid test for the following targets: Staph aureus, Staph  epidermidis, Staph lugdunensis, other Staph species, Enterococcus  faecalis, Enterococcus faecium, Streptococcus species, S. agalactiae,  S. anginosus grp., S. pneumoniae, S. pyogenes, Listeria sp., mecA  (methicillin resistance) and Roosevelt/B (vancomycin resistance).    Critical Value/Significant Value called  to and read back by Jennifer Blanchard RN ([id number]) on 10.14.18 @13:11PM by DW.              No growth after 4 days  >100,000 colonies/mL  Enterococcus faecium (VRE)  *  10,000 to 50,000 colonies/mL  Candida tropicalis  Susceptibility testing not routinely done  *  >100,000 colonies/mL  Citrobacter freundii  *  >100,000 colonies/mL  Enterococcus faecalis  *  Susceptibility testing done on previous specimen     Most Recent TSH, T4 and HgbA1c: No lab results found.  Results for orders placed or performed during the hospital encounter of 10/12/18   XR Chest 2 Views    Narrative    CHEST TWO VIEWS 10/12/2018 9:13 PM     HISTORY: Short of breath. Fever.     COMPARISON: Chest CT 6/13/2017      Impression    IMPRESSION: Lungs are hypoinflated with patchy bilateral perihilar and  basilar opacities, suspicious for infection or aspiration. No evidence  of pleural effusion. Heart size is unchanged.    NADYA BONILLA MD   XR Video Swallow w/o Esophagram    Narrative    VIDEO SWALLOWING EVALUATION   10/15/2018 10:44 AM     HISTORY:  Suspect aspiration.     COMPARISON: None.    FLUOROSCOPY TIME: 1.4 minutes.  SPOT IMAGES OR CINE RUNS: 10    FINDINGS:    Thin: Aspiration.    Nectar: Aspiration.    Honey: No penetration or aspiration.    Pudding: No penetration or aspiration.    Semisolid: No penetration or aspiration.    Solid: No penetration or aspiration.    This study only includes the cervical esophagus.    NADYA BONILLA MD             Medications Prior to Admission:     No prescriptions prior to admission.             Discharge Medications:     Discharge Medication List as of 10/17/2018 11:48 AM      START taking these medications    Details   Cefixime (SUPRAX) 400 MG CAPS capsule Take 1 capsule (400 mg) by mouth daily for 14 days, Disp-14 capsule, R-0, E-Prescribe         CONTINUE these medications which have NOT CHANGED    Details   albuterol (2.5 MG/3ML) 0.083% neb solution Take 1 vial (2.5 mg) by nebulization every 2 hours as  needed for shortness of breath / dyspnea or other (dyspnea), Disp-360 mL, R-0, E-Prescribe      ALLOPURINOL PO Take 300 mg by mouth daily, Historical      ASPIRIN PO Take 81 mg by mouth daily, Historical      Emollient (AMLACTIN ULTRA EX) Externally apply topically daily APPLY TO FEET , Historical      fluocinonide-emollient (LIDEX-E) 0.05 % cream Apply topically 2 times daily as neededHistorical      Metoprolol Tartrate (LOPRESSOR PO) Take 12.5 mg by mouth 2 times daily (0.5 x 25 mg tablet = 12.5 mg dose), Historical      PARoxetine HCl (PAXIL PO) Take 10 mg by mouth daily, Historical      tamsulosin (FLOMAX) 0.4 MG 24 hr capsule Take 0.4 mg by mouth 2 times daily , Historical      order for DME Equipment being ordered: Other: Home nebulizer  Treatment Diagnosis: COPD/PneumoniaDisp-1 Device, R-0, Local Print                   Brief History of Illness:   Ron Gilmore is a 78 year old male who was admitted for dyspnea.          Hospital Course:   This is 78-year-old male admitted with bibasilar community-acquired pneumonia and a COPD exacerbation.  He was treated with ceftriaxone and azithromycin for pneumonia.  He was given supplemental oxygen.  He was treated with prednisone and duo nebs for COPD exacerbation.  The patient was evaluated by speech therapy who recommended a dysphagia diet 3 with honey thick liquids.  Urine culture in the emergency room grew E. coli intermediate sensitivity to Zosyn.  Infectious disease was consulted and recommended that the patient be discharged on 14 days of Suprax.  On day of discharge patient been weaned off of oxygen and was eating and drinking well.  He will be discharged to home with home speech therapy and home RN.  During hospitalization, he was noted that patient had several apneic spells while sleeping.  It is recommended that he have a sleep study as an outpatient.    Greater than 35 minutes were spent in discharge planning.             Pending Results:   Unresulted Labs  Ordered in the Past 30 Days of this Admission     Date and Time Order Name Status Description    10/12/2018 2049 Blood culture Preliminary
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
## 2018-10-17 NOTE — PLAN OF CARE
Problem: Pneumonia (Adult)  Goal: Signs and Symptoms of Listed Potential Problems Will be Absent, Minimized or Managed (Pneumonia)  Signs and symptoms of listed potential problems will be absent, minimized or managed by discharge/transition of care (reference Pneumonia (Adult) CPG).   Outcome: Improving  VSS, A/O, uses a wheelchair at baseline.  Contact precautions for VRE.  Thrush infection identified in mouth today, oral nystatin started.  Incontinent of stool x2 this shift, uses urinal to void.  Plan is to discharge home tomorrow, wife will need some diet instructions and education in AM.

## 2018-10-17 NOTE — PLAN OF CARE
Problem: Patient Care Overview  Goal: Plan of Care/Patient Progress Review  Outcome: No Change  Pt A&Ox4, VSS on RA. WC bound baseline, repositioning self in bed. Left sided weakness r/t previous stroke. L PIV SL'd. PO abx. Tolerating DD3/ honey thick diet. +1 edema in BLE. Contact precautions for VRE in urine. LS diminished, infrequent nonproductive cough. Thrush in mouth improving, Nystatin scheduled. Incontinent of stool x1, voiding in urinal. Discharge today, pt family will come to pick him up.

## 2018-10-17 NOTE — PLAN OF CARE
Problem: Patient Care Overview  Goal: Plan of Care/Patient Progress Review  Discharge Planner SLP   Patient plan for discharge: Home with home health.  Current status: Patient was seen for swallow treatment with time spent on education prior to discharge. Education on how to thicken liquids, DDL 3 and honey thick liquids, modifications, swallow strategies, exercises to start with home care and free water protocol to start when seen by home SLP. Patient verbalized understanding and reviewed with his nurse. Recommend: 1. Continue on the DDL 3 with honey thick liquids. 2. Upright, no straws, double swallow, small bites/sips.   Barriers to return to prior living situation: None  Recommendations for discharge: Home with home health SLP needs.   Rationale for recommendations: ST needs for on going swallowing difficulties. Education, exercises, swallow strategies, diet modifications and free water protocol. May benefit from a repeat OP video swallow study in 4-8 weeks.     Speech Language Therapy Discharge Summary    Reason for therapy discharge:    Discharged to home with home therapy.    Progress towards therapy goal(s). See goals on Care Plan in [facility] health record for goal details.  Goals partially met.  Barriers to achieving goals:   discharge from facility.    Therapy recommendation(s):    Continued therapy is recommended.  Rationale/Recommendations:  Continue ST for diet tolerance, advancement as able, free water protocol and efrain- pharyngeal exercises. .           Entered by: Saritha Castro 10/17/2018 9:30 AM

## 2018-10-17 NOTE — PROGRESS NOTES
[facility]  Met with pt to discuss plans for HC.  Pt to be discharged home today and has agreed to have [facility] follow with services of SN and SLP. Patient care support center processing referral.  Pt verbalized understanding that initial visit is scheduled for 10/18/18 or 10/19/18.  Pt has 24 hour phone number for [facility] for any questions or concerns.

## 2018-10-19 LAB
BACTERIA SPEC CULT: NO GROWTH
Lab: NORMAL
SPECIMEN SOURCE: NORMAL

## 2019-06-27 ENCOUNTER — HOSPITAL ENCOUNTER (EMERGENCY)
Facility: CLINIC | Age: 79
Discharge: HOME | End: 2019-06-28
Attending: EMERGENCY MEDICINE | Admitting: EMERGENCY MEDICINE
Payer: COMMERCIAL

## 2019-06-27 VITALS
DIASTOLIC BLOOD PRESSURE: 86 MMHG | HEART RATE: 106 BPM | OXYGEN SATURATION: 93 % | SYSTOLIC BLOOD PRESSURE: 144 MMHG | TEMPERATURE: 98.4 F

## 2019-06-27 DIAGNOSIS — R33.9 URINARY RETENTION: ICD-10-CM

## 2019-06-27 DIAGNOSIS — Z86.19 HISTORY OF INFECTION DUE TO MULTIPLE DRUG RESISTANT BACTERIUM: ICD-10-CM

## 2019-06-27 DIAGNOSIS — N30.01 ACUTE CYSTITIS WITH HEMATURIA: ICD-10-CM

## 2019-06-27 LAB
ALBUMIN UR-MCNC: 30 MG/DL
APPEARANCE UR: ABNORMAL
BILIRUB UR QL STRIP: NEGATIVE
COLOR UR AUTO: YELLOW
GLUCOSE UR STRIP-MCNC: NEGATIVE MG/DL
HGB UR QL STRIP: ABNORMAL
KETONES UR STRIP-MCNC: NEGATIVE MG/DL
LEUKOCYTE ESTERASE UR QL STRIP: ABNORMAL
MUCOUS THREADS #/AREA URNS LPF: PRESENT /LPF
NITRATE UR QL: NEGATIVE
PH UR STRIP: 6 PH (ref 5–7)
RBC #/AREA URNS AUTO: 93 /HPF (ref 0–2)
SOURCE: ABNORMAL
SP GR UR STRIP: 1.02 (ref 1–1.03)
UROBILINOGEN UR STRIP-MCNC: NORMAL MG/DL (ref 0–2)
WBC #/AREA URNS AUTO: >182 /HPF (ref 0–5)
WBC CLUMPS #/AREA URNS HPF: PRESENT /HPF
YEAST #/AREA URNS HPF: ABNORMAL /HPF

## 2019-06-27 PROCEDURE — 81001 URINALYSIS AUTO W/SCOPE: CPT | Performed by: EMERGENCY MEDICINE

## 2019-06-27 PROCEDURE — 99283 EMERGENCY DEPT VISIT LOW MDM: CPT

## 2019-06-27 PROCEDURE — 87106 FUNGI IDENTIFICATION YEAST: CPT | Performed by: EMERGENCY MEDICINE

## 2019-06-27 PROCEDURE — 87086 URINE CULTURE/COLONY COUNT: CPT | Performed by: EMERGENCY MEDICINE

## 2019-06-27 RX ORDER — LIDOCAINE HYDROCHLORIDE 20 MG/ML
10 JELLY TOPICAL ONCE
Status: DISCONTINUED | OUTPATIENT
Start: 2019-06-27 | End: 2019-06-28 | Stop reason: HOSPADM

## 2019-06-27 RX ORDER — LIDOCAINE HYDROCHLORIDE 20 MG/ML
10 JELLY TOPICAL ONCE
Status: DISCONTINUED | OUTPATIENT
Start: 2019-06-27 | End: 2019-06-27

## 2019-06-27 ASSESSMENT — ENCOUNTER SYMPTOMS
DYSURIA: 1
FREQUENCY: 1
DIFFICULTY URINATING: 1
ABDOMINAL PAIN: 0
HEMATURIA: 0
FEVER: 0

## 2019-06-28 PROCEDURE — 25000132 ZZH RX MED GY IP 250 OP 250 PS 637: Performed by: EMERGENCY MEDICINE

## 2019-06-28 RX ORDER — TAMSULOSIN HYDROCHLORIDE 0.4 MG/1
0.4 CAPSULE ORAL EVERY MORNING
Qty: 14 CAPSULE | Refills: 0 | Status: ON HOLD | OUTPATIENT
Start: 2019-06-28 | End: 2020-01-20

## 2019-06-28 RX ORDER — CEFIXIME 400 MG/1
400 CAPSULE ORAL ONCE
Status: COMPLETED | OUTPATIENT
Start: 2019-06-28 | End: 2019-06-28

## 2019-06-28 RX ORDER — CEFIXIME 400 MG/1
400 CAPSULE ORAL DAILY
Status: DISCONTINUED | OUTPATIENT
Start: 2019-06-28 | End: 2019-06-28

## 2019-06-28 RX ORDER — CEFIXIME 400 MG/1
400 CAPSULE ORAL DAILY
Qty: 9 CAPSULE | Refills: 0 | Status: SHIPPED | OUTPATIENT
Start: 2019-06-28 | End: 2020-01-15

## 2019-06-28 RX ADMIN — CEFIXIME 400 MG: 400 CAPSULE ORAL at 00:27

## 2019-06-28 NOTE — DISCHARGE INSTRUCTIONS
Discharge Instructions  Urinary Tract Infection  You or your child have been diagnosed with a urinary tract infection, or UTI. The urinary tract includes the kidneys (which make urine/pee), ureters (the tubes that carry urine/pee from the kidneys to the bladder), the bladder (which stores urine/pee), and urethra (the tube that carries urine/pee out of the bladder). Urinary tract infections occur when bacteria travel up the urethra into the bladder (bladder infection) and, in some cases, from there into the kidneys (kidney infection).  Generally, every Emergency Department visit should have a follow-up clinic visit with either a primary or a specialty clinic/provider. Please follow-up as instructed by your emergency provider today.  Return to the Emergency Department if:  You or your child have severe back pain.  You or your child are vomiting (throwing up) so that you cannot take your medicine.  You or your child have a new fever (had not previously had a fever) over 101 F.  You or your child have confusion or are very weak, or feel very ill.  Your child seems much more ill, will not wake up, will not respond right, or is crying for a long time and will not calm down.  You or your child are showing signs of dehydration. These signs may include decreased urination (pee), dry mouth/gums/tongue, or decreased activity.    Follow-up with your provider:   Children under 24 months need to be seen by their regular provider within one week after a diagnosis of a UTI. It may be necessary to do some more tests to look at the child s kidney or bladder.  You should begin to feel better within 24 - 48 hours of starting your antibiotic; follow-up with your regular clinic/doctor/provider if this is not the case.    Treatment:   You will be treated with an antibiotic to kill the bacteria. We have to make an educated guess, based on what we know about common bacteria and antibiotics, as to which antibiotic will work for your  "infection. We will be correct most times but there will be some cases where the antibiotic chosen is not correct (see urine cultures below).  Take a pain medication such as acetaminophen (Tylenol ) or ibuprofen (Advil , Motrin , Nuprin ).  Phenazopyridine (Pyridium , Uristat ) is a prescription medication that numbs the bladder to reduce the burning pain of some UTIs.  The same medication is available in a non-prescription version (Azo-Standard , Urodol ). This medication will change the color of the urine and tears (usually blue or orange). If you wear contacts, do not wear them while taking this medication as they may be stained by the medication.    Urine Cultures:  If indicated, a urine culture may have been performed today. This test generally takes 24-48 hours to complete so the results are not known at this time. The results can confirm that an infection is present but also determine which antibiotic is effective for the specific bacteria that is causing the infection. If your urine culture shows that the antibiotic you were given today will not work to treat your infection, we will attempt to contact you to make arrangements to change the antibiotic. If the culture confirms that the antibiotic is effective for your infection, you will not be contacted. We often recommend follow-up with your regular physician/provider on the culture results regardless of this process.    Antibiotic Warning:   If you have been placed on antibiotics - watch for signs of allergic reaction.  These include rash, lip swelling, difficulty breathing, wheezing, and dizziness.  If you develop any of these symptoms, stop the antibiotic immediately and go to an emergency room or urgent care for evaluation.    Probiotics: If you have been given an antibiotic, you may want to also take a probiotic pill or eat yogurt with live cultures. Probiotics have \"good bacteria\" to help your intestines stay healthy. Studies have shown that probiotics " help prevent diarrhea and other intestine problems (including C. diff infection) when you take antibiotics. You can buy these without a prescription in the pharmacy section of the store.   If you were given a prescription for medicine here today, be sure to read all of the information (including the package insert) that comes with your prescription.  This will include important information about the medicine, its side effects, and any warnings that you need to know about.  The pharmacist who fills the prescription can provide more information and answer questions you may have about the medicine.  If you have questions or concerns that the pharmacist cannot address, please call or return to the Emergency Department.   Remember that you can always come back to the Emergency Department if you are not able to see your regular provider in the amount of time listed above, if you get any new symptoms, or if there is anything that worries you.

## 2019-06-28 NOTE — ED PROVIDER NOTES
"    History     Chief Complaint:  Urinary Retention      HPI   Ron Gilmore is a 78 year old male who presents with urinary retention. The patient reports that at night, he is unable to urinate despite feeling like he needs to. During the day, he notes he is able to urinate but still has difficulty. He also notes dysuria for the last week. He denies history of prostate issues but then later describes being \"rotorooted\" in the past. He states that the last time he urinated was 1600 today.  H/o multi drug resistant urinary tract infections.  Denies fevers, abdominal pain, back pain.  Mildly tachycardic here, denies chest pain, palpitations, diaphoresis, shortness of breath.    Allergies:  No known drug allergies    Medications:    Albuterol inhaler  Allopurinol  Aspirin  Emollient  Lopressor  Paxil    Past Medical History:    Benign prostatic hyperplasia  Cataract  Cholelithiasis  COPD  Depression  Diabetes mellitus  Dyshidrotic foot dermatitis  Edema  Gout  Hyperlipidemia  Hypertension  Kidney stones  Lumbago  Lumbar disc displacement without myelopathy  Muscle weakness  Diabetic neuropathy  Obesity  Spinal stenosis  Stroke  UTI    Past Surgical History:    Appendectomy  Shoulder rotator cuff repair   Cataracts (Bilateral)  Cholecystectomy  Cystoscopy, transurethral resection   Eye surgery, lid surgery (R)  Hip replacement (R)  Knee surgery (Bilateral)  Laminectomy lumbar one level  Tonsillectomy    Family History:    Prostate cancer    Social History:  Smoking status: Former  Alcohol use: No  Drug use: No  PCP: Augustin Thomas  Presents to the ED with his children  Marital Status:   [2]    Review of Systems   Constitutional: Negative for fever.   Gastrointestinal: Negative for abdominal pain.   Genitourinary: Positive for difficulty urinating, dysuria and frequency. Negative for hematuria.   All other systems reviewed and are negative.    Physical Exam     Patient Vitals for the past 24 hrs:   BP Temp " Temp src Pulse SpO2   06/27/19 2246 144/86 98.4  F (36.9  C) Oral 106 93 %     Physical Exam  Eyes:               Sclera white; Pupils are equal and round  ENT:                External ears and nares normal  CV:                  Rate as above with regular rhythm   Resp:               Non-labored, no retractions or accessory muscle use  GI:                   Abdomen is soft, non-tender, non-distended                          No rebound tenderness or peritoneal features  MS:                  Moves all extremities  Skin:                Warm and dry  Neuro:             Speech is normal and fluent. No apparent deficit.    Emergency Department Course   Laboratory:  UA: Small blood, protein albumin 30, large leukocyte esterase, WBC/HPT >182 (H), RBC/HPF 93 (H), WBC Clumps present, few yeast , mucous present, o/w Negative  Urine culture: Pending    Interventions:  0027: Suprax 400 mg PO    Emergency Department Course:  Past medical records, nursing notes, and vitals reviewed.  2304: I performed an exam of the patient and obtained history, as documented above.    0000: I rechecked the patient. Explained findings to the patient.    0005: I rechecked the patient. Explained findings to the patient.    0420: I rechecked the patient. Findings and plan explained to the patient. Patient discharged home with instructions regarding supportive care, medications, and reasons to return. The importance of close follow-up was reviewed.     Impression & Plan    Medical Decision Making:  Ron Gilmore is a 78 year old male who presents to the emergency department today for evaluation of difficulty urinating tonight. This is a frequent problem at night but not during the day. Bladder scan was considered to evaluate for retention, but given the need for UA to evaluate for infection, catheter was placed instead. Total volume removed was 300 mLs; not a PVR. This is suggestive of retention, but patient does not want the catheter left in place.  I think with this volume, it's reasonable to do a trial of Flomax and have patient return if he has recurrent retention issues. UA is consistent with infection. He has history of multidrug resistant e.coli. He will be on Cefixime and first dose was given in the ED due to the difficulty he will have trying to fill this in the middle of the night. He was noted to be a little tachycardic here but denies any issues with shortness of breath, chest pain, dizziness, or abdominal pain or flank pain. Offered evaluation which he declined.    Diagnosis:    ICD-10-CM    1. Acute cystitis with microscopic hematuria N30.01 Urine Culture   2. 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 Urinary retention R33.9      Disposition:  discharged to home    Discharge Medications:  Medication List   Started    cefixime 400 MG capsule  Commonly known as:  SUPRAX  400 mg, Oral, DAILY     Modified    tamsulosin 0.4 MG capsule  Commonly known as:  FLOMAX  0.4 mg, Oral, EVERY MORNING  What changed:  when to take this       Patrice Christie  6/27/2019    EMERGENCY DEPARTMENT  I, Patrice Christie, am serving as a scribe at 11:04 PM on 6/27/2019 to document services personally performed by Maria D Sanchez MD based on my observations and the provider's statements to me.       Maria D Sanchez MD  06/28/19 7332

## 2019-06-28 NOTE — ED TRIAGE NOTES
Pt states unable to pee at night only. Pt states urinated fine during the day but at night time the pt has trouble urinating

## 2019-06-29 LAB
BACTERIA SPEC CULT: ABNORMAL
BACTERIA SPEC CULT: ABNORMAL
Lab: ABNORMAL
SPECIMEN SOURCE: ABNORMAL

## 2020-01-14 ENCOUNTER — HOSPITAL ENCOUNTER (INPATIENT)
Facility: CLINIC | Age: 80
LOS: 8 days | Discharge: NURSING, REHAB OR LONG-TERM CARE | DRG: 040 | End: 2020-01-23
Attending: EMERGENCY MEDICINE | Admitting: HOSPITALIST
Payer: COMMERCIAL

## 2020-01-14 DIAGNOSIS — I63.9 CEREBROVASCULAR ACCIDENT (CVA), UNSPECIFIED MECHANISM (H): ICD-10-CM

## 2020-01-14 DIAGNOSIS — J18.9 COMMUNITY ACQUIRED PNEUMONIA, UNSPECIFIED LATERALITY: ICD-10-CM

## 2020-01-14 DIAGNOSIS — R29.898 LEFT ARM WEAKNESS: ICD-10-CM

## 2020-01-14 DIAGNOSIS — I63.9 CEREBROVASCULAR ACCIDENT (CVA), UNSPECIFIED MECHANISM (H): Primary | ICD-10-CM

## 2020-01-14 DIAGNOSIS — J81.0 ACUTE PULMONARY EDEMA (H): ICD-10-CM

## 2020-01-14 PROCEDURE — 99285 EMERGENCY DEPT VISIT HI MDM: CPT | Mod: 25

## 2020-01-14 PROCEDURE — 80048 BASIC METABOLIC PNL TOTAL CA: CPT | Performed by: EMERGENCY MEDICINE

## 2020-01-14 PROCEDURE — 85730 THROMBOPLASTIN TIME PARTIAL: CPT | Performed by: EMERGENCY MEDICINE

## 2020-01-14 PROCEDURE — 85025 COMPLETE CBC W/AUTO DIFF WBC: CPT | Performed by: EMERGENCY MEDICINE

## 2020-01-14 PROCEDURE — 83036 HEMOGLOBIN GLYCOSYLATED A1C: CPT | Performed by: EMERGENCY MEDICINE

## 2020-01-14 PROCEDURE — 84484 ASSAY OF TROPONIN QUANT: CPT | Performed by: EMERGENCY MEDICINE

## 2020-01-14 PROCEDURE — 25000128 H RX IP 250 OP 636: Performed by: EMERGENCY MEDICINE

## 2020-01-14 PROCEDURE — 85610 PROTHROMBIN TIME: CPT | Performed by: EMERGENCY MEDICINE

## 2020-01-14 PROCEDURE — 96365 THER/PROPH/DIAG IV INF INIT: CPT

## 2020-01-14 PROCEDURE — 93005 ELECTROCARDIOGRAM TRACING: CPT

## 2020-01-14 RX ORDER — IOPAMIDOL 755 MG/ML
120 INJECTION, SOLUTION INTRAVASCULAR ONCE
Status: COMPLETED | OUTPATIENT
Start: 2020-01-14 | End: 2020-01-14

## 2020-01-14 RX ADMIN — IOPAMIDOL 120 ML: 755 INJECTION, SOLUTION INTRAVENOUS at 23:59

## 2020-01-15 ENCOUNTER — APPOINTMENT (OUTPATIENT)
Dept: CT IMAGING | Facility: CLINIC | Age: 80
DRG: 040 | End: 2020-01-15
Attending: EMERGENCY MEDICINE
Payer: COMMERCIAL

## 2020-01-15 ENCOUNTER — APPOINTMENT (OUTPATIENT)
Dept: SPEECH THERAPY | Facility: CLINIC | Age: 80
DRG: 040 | End: 2020-01-15
Payer: COMMERCIAL

## 2020-01-15 ENCOUNTER — APPOINTMENT (OUTPATIENT)
Dept: MRI IMAGING | Facility: CLINIC | Age: 80
DRG: 040 | End: 2020-01-15
Attending: HOSPITALIST
Payer: COMMERCIAL

## 2020-01-15 ENCOUNTER — APPOINTMENT (OUTPATIENT)
Dept: GENERAL RADIOLOGY | Facility: CLINIC | Age: 80
DRG: 040 | End: 2020-01-15
Attending: EMERGENCY MEDICINE
Payer: COMMERCIAL

## 2020-01-15 PROBLEM — R53.1 LEFT-SIDED WEAKNESS: Status: ACTIVE | Noted: 2020-01-15

## 2020-01-15 PROBLEM — R29.898 WEAKNESS OF LEFT UPPER EXTREMITY: Status: ACTIVE | Noted: 2020-01-15

## 2020-01-15 LAB
ALBUMIN UR-MCNC: 10 MG/DL
ANION GAP SERPL CALCULATED.3IONS-SCNC: 2 MMOL/L (ref 3–14)
APPEARANCE UR: CLEAR
APTT PPP: 29 SEC (ref 22–37)
BASOPHILS # BLD AUTO: 0 10E9/L (ref 0–0.2)
BASOPHILS NFR BLD AUTO: 0.1 %
BILIRUB UR QL STRIP: NEGATIVE
BUN SERPL-MCNC: 24 MG/DL (ref 7–30)
CALCIUM SERPL-MCNC: 7.9 MG/DL (ref 8.5–10.1)
CHLORIDE SERPL-SCNC: 103 MMOL/L (ref 94–109)
CO2 SERPL-SCNC: 33 MMOL/L (ref 20–32)
COLOR UR AUTO: YELLOW
CREAT SERPL-MCNC: 0.86 MG/DL (ref 0.66–1.25)
DIFFERENTIAL METHOD BLD: ABNORMAL
EOSINOPHIL # BLD AUTO: 0.4 10E9/L (ref 0–0.7)
EOSINOPHIL NFR BLD AUTO: 4.1 %
ERYTHROCYTE [DISTWIDTH] IN BLOOD BY AUTOMATED COUNT: 15.3 % (ref 10–15)
GFR SERPL CREATININE-BSD FRML MDRD: 83 ML/MIN/{1.73_M2}
GLUCOSE BLDC GLUCOMTR-MCNC: 117 MG/DL (ref 70–99)
GLUCOSE SERPL-MCNC: 103 MG/DL (ref 70–99)
GLUCOSE UR STRIP-MCNC: NEGATIVE MG/DL
HBA1C MFR BLD: 6 % (ref 0–5.6)
HCT VFR BLD AUTO: 44.8 % (ref 40–53)
HGB BLD-MCNC: 13.4 G/DL (ref 13.3–17.7)
HGB UR QL STRIP: NEGATIVE
IMM GRANULOCYTES # BLD: 0 10E9/L (ref 0–0.4)
IMM GRANULOCYTES NFR BLD: 0.2 %
INR PPP: 1.13 (ref 0.86–1.14)
INTERPRETATION ECG - MUSE: NORMAL
KETONES UR STRIP-MCNC: NEGATIVE MG/DL
LEUKOCYTE ESTERASE UR QL STRIP: ABNORMAL
LYMPHOCYTES # BLD AUTO: 2.2 10E9/L (ref 0.8–5.3)
LYMPHOCYTES NFR BLD AUTO: 24.8 %
MCH RBC QN AUTO: 27.7 PG (ref 26.5–33)
MCHC RBC AUTO-ENTMCNC: 29.9 G/DL (ref 31.5–36.5)
MCV RBC AUTO: 93 FL (ref 78–100)
MONOCYTES # BLD AUTO: 1 10E9/L (ref 0–1.3)
MONOCYTES NFR BLD AUTO: 11.2 %
MUCOUS THREADS #/AREA URNS LPF: PRESENT /LPF
NEUTROPHILS # BLD AUTO: 5.2 10E9/L (ref 1.6–8.3)
NEUTROPHILS NFR BLD AUTO: 59.6 %
NITRATE UR QL: NEGATIVE
NRBC # BLD AUTO: 0 10*3/UL
NRBC BLD AUTO-RTO: 0 /100
PH UR STRIP: 6 PH (ref 5–7)
PLATELET # BLD AUTO: 184 10E9/L (ref 150–450)
POTASSIUM SERPL-SCNC: 4.2 MMOL/L (ref 3.4–5.3)
RBC # BLD AUTO: 4.83 10E12/L (ref 4.4–5.9)
RBC #/AREA URNS AUTO: 3 /HPF (ref 0–2)
SODIUM SERPL-SCNC: 138 MMOL/L (ref 133–144)
SOURCE: ABNORMAL
SP GR UR STRIP: 1.01 (ref 1–1.03)
TROPONIN I SERPL-MCNC: <0.015 UG/L (ref 0–0.04)
UROBILINOGEN UR STRIP-MCNC: 2 MG/DL (ref 0–2)
WBC # BLD AUTO: 8.7 10E9/L (ref 4–11)
WBC #/AREA URNS AUTO: 72 /HPF (ref 0–5)
YEAST #/AREA URNS HPF: ABNORMAL /HPF

## 2020-01-15 PROCEDURE — 71045 X-RAY EXAM CHEST 1 VIEW: CPT

## 2020-01-15 PROCEDURE — 25800030 ZZH RX IP 258 OP 636: Performed by: HOSPITALIST

## 2020-01-15 PROCEDURE — 0042T CT HEAD PERFUSION WITH CONTRAST: CPT

## 2020-01-15 PROCEDURE — 12000000 ZZH R&B MED SURG/OB

## 2020-01-15 PROCEDURE — 25000132 ZZH RX MED GY IP 250 OP 250 PS 637: Performed by: HOSPITALIST

## 2020-01-15 PROCEDURE — 25000128 H RX IP 250 OP 636: Performed by: HOSPITALIST

## 2020-01-15 PROCEDURE — 70496 CT ANGIOGRAPHY HEAD: CPT

## 2020-01-15 PROCEDURE — 25000132 ZZH RX MED GY IP 250 OP 250 PS 637: Performed by: EMERGENCY MEDICINE

## 2020-01-15 PROCEDURE — 25500064 ZZH RX 255 OP 636: Performed by: HOSPITALIST

## 2020-01-15 PROCEDURE — 81001 URINALYSIS AUTO W/SCOPE: CPT | Performed by: EMERGENCY MEDICINE

## 2020-01-15 PROCEDURE — 25800030 ZZH RX IP 258 OP 636: Performed by: EMERGENCY MEDICINE

## 2020-01-15 PROCEDURE — A9585 GADOBUTROL INJECTION: HCPCS | Performed by: HOSPITALIST

## 2020-01-15 PROCEDURE — 25800030 ZZH RX IP 258 OP 636: Performed by: INTERNAL MEDICINE

## 2020-01-15 PROCEDURE — 99223 1ST HOSP IP/OBS HIGH 75: CPT | Mod: AI | Performed by: HOSPITALIST

## 2020-01-15 PROCEDURE — 70450 CT HEAD/BRAIN W/O DYE: CPT

## 2020-01-15 PROCEDURE — 92526 ORAL FUNCTION THERAPY: CPT | Mod: GN | Performed by: SPEECH-LANGUAGE PATHOLOGIST

## 2020-01-15 PROCEDURE — 70553 MRI BRAIN STEM W/O & W/DYE: CPT

## 2020-01-15 PROCEDURE — 00000146 ZZHCL STATISTIC GLUCOSE BY METER IP

## 2020-01-15 PROCEDURE — 96361 HYDRATE IV INFUSION ADD-ON: CPT

## 2020-01-15 PROCEDURE — 92610 EVALUATE SWALLOWING FUNCTION: CPT | Mod: GN | Performed by: SPEECH-LANGUAGE PATHOLOGIST

## 2020-01-15 RX ORDER — NALOXONE HYDROCHLORIDE 0.4 MG/ML
.1-.4 INJECTION, SOLUTION INTRAMUSCULAR; INTRAVENOUS; SUBCUTANEOUS
Status: DISCONTINUED | OUTPATIENT
Start: 2020-01-15 | End: 2020-01-23 | Stop reason: HOSPADM

## 2020-01-15 RX ORDER — SODIUM CHLORIDE 9 MG/ML
INJECTION, SOLUTION INTRAVENOUS CONTINUOUS
Status: DISCONTINUED | OUTPATIENT
Start: 2020-01-15 | End: 2020-01-19

## 2020-01-15 RX ORDER — ATORVASTATIN CALCIUM 40 MG/1
40 TABLET, FILM COATED ORAL
Status: DISCONTINUED | OUTPATIENT
Start: 2020-01-15 | End: 2020-01-16

## 2020-01-15 RX ORDER — AMOXICILLIN 250 MG
2 CAPSULE ORAL 2 TIMES DAILY PRN
Status: DISCONTINUED | OUTPATIENT
Start: 2020-01-15 | End: 2020-01-23 | Stop reason: HOSPADM

## 2020-01-15 RX ORDER — LABETALOL HYDROCHLORIDE 5 MG/ML
10-40 INJECTION, SOLUTION INTRAVENOUS EVERY 10 MIN PRN
Status: DISCONTINUED | OUTPATIENT
Start: 2020-01-15 | End: 2020-01-23 | Stop reason: HOSPADM

## 2020-01-15 RX ORDER — AMOXICILLIN 250 MG
1 CAPSULE ORAL 2 TIMES DAILY PRN
Status: DISCONTINUED | OUTPATIENT
Start: 2020-01-15 | End: 2020-01-23 | Stop reason: HOSPADM

## 2020-01-15 RX ORDER — ASPIRIN 300 MG/1
300 SUPPOSITORY RECTAL DAILY
Status: DISCONTINUED | OUTPATIENT
Start: 2020-01-15 | End: 2020-01-18

## 2020-01-15 RX ORDER — BISACODYL 10 MG
10 SUPPOSITORY, RECTAL RECTAL DAILY PRN
Status: DISCONTINUED | OUTPATIENT
Start: 2020-01-15 | End: 2020-01-23 | Stop reason: HOSPADM

## 2020-01-15 RX ORDER — ASPIRIN 325 MG
325 TABLET ORAL ONCE
Status: COMPLETED | OUTPATIENT
Start: 2020-01-15 | End: 2020-01-15

## 2020-01-15 RX ORDER — ONDANSETRON 4 MG/1
4 TABLET, ORALLY DISINTEGRATING ORAL EVERY 6 HOURS PRN
Status: DISCONTINUED | OUTPATIENT
Start: 2020-01-15 | End: 2020-01-23 | Stop reason: HOSPADM

## 2020-01-15 RX ORDER — GADOBUTROL 604.72 MG/ML
12 INJECTION INTRAVENOUS ONCE
Status: COMPLETED | OUTPATIENT
Start: 2020-01-15 | End: 2020-01-15

## 2020-01-15 RX ORDER — ONDANSETRON 2 MG/ML
4 INJECTION INTRAMUSCULAR; INTRAVENOUS EVERY 6 HOURS PRN
Status: DISCONTINUED | OUTPATIENT
Start: 2020-01-15 | End: 2020-01-23 | Stop reason: HOSPADM

## 2020-01-15 RX ORDER — PROCHLORPERAZINE 25 MG
12.5 SUPPOSITORY, RECTAL RECTAL EVERY 12 HOURS PRN
Status: DISCONTINUED | OUTPATIENT
Start: 2020-01-15 | End: 2020-01-23 | Stop reason: HOSPADM

## 2020-01-15 RX ORDER — PROCHLORPERAZINE MALEATE 5 MG
5 TABLET ORAL EVERY 6 HOURS PRN
Status: DISCONTINUED | OUTPATIENT
Start: 2020-01-15 | End: 2020-01-23 | Stop reason: HOSPADM

## 2020-01-15 RX ORDER — LIDOCAINE 40 MG/G
CREAM TOPICAL
Status: DISCONTINUED | OUTPATIENT
Start: 2020-01-15 | End: 2020-01-23 | Stop reason: HOSPADM

## 2020-01-15 RX ORDER — METOPROLOL TARTRATE 25 MG/1
12.5 TABLET, FILM COATED ORAL 2 TIMES DAILY
Status: ON HOLD | COMMUNITY
End: 2020-01-23

## 2020-01-15 RX ORDER — ACETAMINOPHEN 325 MG/1
650 TABLET ORAL EVERY 4 HOURS PRN
Status: DISCONTINUED | OUTPATIENT
Start: 2020-01-15 | End: 2020-01-23 | Stop reason: HOSPADM

## 2020-01-15 RX ORDER — LORAZEPAM 2 MG/ML
0.5 INJECTION INTRAMUSCULAR
Status: COMPLETED | OUTPATIENT
Start: 2020-01-15 | End: 2020-01-15

## 2020-01-15 RX ORDER — CLOPIDOGREL 300 MG/1
300 TABLET, FILM COATED ORAL ONCE
Status: COMPLETED | OUTPATIENT
Start: 2020-01-15 | End: 2020-01-15

## 2020-01-15 RX ADMIN — LORAZEPAM 0.5 MG: 2 INJECTION INTRAMUSCULAR; INTRAVENOUS at 20:24

## 2020-01-15 RX ADMIN — SODIUM CHLORIDE 1000 ML: 9 INJECTION, SOLUTION INTRAVENOUS at 01:33

## 2020-01-15 RX ADMIN — SODIUM CHLORIDE 1000 ML: 9 INJECTION, SOLUTION INTRAVENOUS at 10:19

## 2020-01-15 RX ADMIN — GADOBUTROL 12 ML: 604.72 INJECTION INTRAVENOUS at 21:20

## 2020-01-15 RX ADMIN — CLOPIDOGREL BISULFATE 300 MG: 300 TABLET, FILM COATED ORAL at 01:33

## 2020-01-15 RX ADMIN — ASPIRIN 325 MG ORAL TABLET 325 MG: 325 PILL ORAL at 01:33

## 2020-01-15 RX ADMIN — SODIUM CHLORIDE: 9 INJECTION, SOLUTION INTRAVENOUS at 16:46

## 2020-01-15 RX ADMIN — ATORVASTATIN CALCIUM 40 MG: 40 TABLET, FILM COATED ORAL at 22:21

## 2020-01-15 ASSESSMENT — ENCOUNTER SYMPTOMS: WEAKNESS: 1

## 2020-01-15 ASSESSMENT — ACTIVITIES OF DAILY LIVING (ADL)
ADLS_ACUITY_SCORE: 19
ADLS_ACUITY_SCORE: 18

## 2020-01-15 ASSESSMENT — MIFFLIN-ST. JEOR: SCORE: 1989.53

## 2020-01-15 NOTE — PROGRESS NOTES
Update note:     Patient seen and examined after arrival to the floor.  No new neurologic symptoms. No complaints    Plan:   Continued stroke evaluation     Hernando Blanca DO   [facility]

## 2020-01-15 NOTE — PROGRESS NOTES
"   01/15/20 1700   General Information   Onset Date 01/15/20   Start of Care Date 01/15/20   Referring Physician David Rodríguez MD   Patient Profile Review/OT: Additional Occupational Profile Info See Profile for full history and prior level of function   Patient/Family Goals Statement orange juice   Swallowing Evaluation Bedside swallow evaluation   Behaviorial Observations WFL (within functional limits)   Mode of current nutrition NPO   Respiratory Status O2 Supply   Type of O2 supply Nasal cannula   Comments \"Mr. Ron Gilmore is a 79-year-old male who presented to the ER with left upper extremity weakness.  He does have prior history of stroke with some residual left upper extremity weakness and has mostly been bedbound.  He is able to maneuver himself from bed to power wheelchair, but tonight while going to bed, he noticed that his left arm was significantly weaker and was unable to maneuver himself from the power chair to the bed and slid to the ground.  EMS was called who brought the patient to the ER for further evaluation.  The family also notes that his speech has been slightly difficult to understand.\" MRI pending.    Clinical Swallow Evaluation   Oral Musculature anomalies present   Dentition upper dentures;lower dentures  (ill fitting)   Mucosal Quality dry;sticky  (thick secretion on palate)   Oral Labial Strength and Mobility impaired seal   Lingual Strength and Mobility impaired left lateral movement   Laryngeal Function Cough;Swallow;Voicing initiated   Oral Musculature Comments left droop   General Therapy Interventions   Planned Therapy Interventions Dysphagia Treatment   Dysphagia treatment Instruction of safe swallow strategies;Modified diet education   Swallow Eval: Clinical Impressions   Skilled Criteria for Therapy Intervention Skilled criteria met.  Treatment indicated.   Functional Assessment Scale (FAS) 3   Treatment Diagnosis dysphagia   Diet texture recommendations Dysphagia diet " level 2;Honey thick liquids   Recommended Feeding/Eating Techniques alternate between small bites and sips of food/liquid;check mouth frequently for oral residue/pocketing;hard swallow w/ each bite or sip;maintain upright posture during/after eating for 30 mins;small sips/bites   Demonstrates Need for Referral to Another Service dietitian   Therapy Frequency Daily   Predicted Duration of Therapy Intervention (days/wks) 1 week   Anticipated Discharge Disposition home w/ home health   Risks and Benefits of Treatment have been explained. Yes   Patient, family and/or staff in agreement with Plan of Care Yes   Clinical Impression Comments SLP: Baseline dysphagia with latest VFSS in 2018 finding silent aspiration of thin liquids, deep penetration and aspiration with cough on nectars. Pt reported strict compliance with honey thick liquids at home and regular diet, though admits to coughing with peanuts, corn, seasame seeds, and beans. Oral motor exam significant for dysarthria and new left sided droop. Poor fitting dentures. Honey thick liquids by teaspoon and puree solids trialed with good timing, no overt s/sx of aspiration and no oral residue. Difficulty with liquids by cup and pt prefers to use nosey cup at home. Trialed regular textures with oral residue and suspect significant pharyngeal residue with multiple coughing episodes. Pt reported cough was not related to swallow, however, RN reported no baseline cough all day. Education provided to pt and family. They verblized agreement with recommended: dysphagia diet level 2 and honey thick liquids with assist feeding, slow rate of intake, small bites and sips, alternate solids and liquids. Hold if overt s/sx of aspiration. Consider repeat VFSS if pt would like to advance to a regular diet.    Total Evaluation Time   Total Evaluation Time (Minutes) 30

## 2020-01-15 NOTE — ED PROVIDER NOTES
History     Chief Complaint:  Extremity Weakness and Slurred Speech       HPI   Ron Gilmore is a 79 year old male who presents with left upper extremity weakness that started between 4 PM and 7 PM tonight.  He states that he had a stroke 5 years ago, on aspirin only, and his residual weakness from that stroke is left upper extremity weakness.  However he is normally able to use this arm still with a functional degree of strength, however sometime shortly after dinnertime, he became very weak in this area, almost flaccid.  911 was called, they tell me that his left upper extremity is actually improving throughout there 30-minute assessment and drive to this hospital.  Patient is mentating appropriately, does also endorse a little bit of slurred speech.    Allergies:  No Known Allergies     Medications:    albuterol (2.5 MG/3ML) 0.083% neb solution  ALLOPURINOL PO  ASPIRIN PO  cefixime (SUPRAX) 400 MG capsule  Emollient (AMLACTIN ULTRA EX)  fluocinonide-emollient (LIDEX-E) 0.05 % cream  Metoprolol Tartrate (LOPRESSOR PO)  order for DME  PARoxetine HCl (PAXIL PO)  tamsulosin (FLOMAX) 0.4 MG capsule        Past Medical History:    Past Medical History:   Diagnosis Date     BPH (benign prostatic hyperplasia)      Cataract      Cholelithiasis      COPD (chronic obstructive pulmonary disease) (H)      Depression      Diabetes mellitus      Dyshidrotic foot dermatitis      Edema      Gout      Hyperlipidemia      Hypertension      Kidney stones      Lumbago      Lumbar disc displacement without myelopathy      Muscle weakness      Neuropathy, diabetic (H)      Obesity      Spinal stenosis      Stroke (H)      Unsteady gait      Urinary retention with incomplete bladder emptying      UTI (urinary tract infection)        Patient Active Problem List    Diagnosis Date Noted     Weakness of left upper extremity 01/15/2020     Priority: Medium     Urinary retention 10/12/2018     Priority: Medium     COPD (chronic  obstructive pulmonary disease) (H) 10/12/2018     Priority: Medium     Post-operative state 02/21/2018     Priority: Medium     Tibia/fibula fracture 06/23/2017     Priority: Medium     Closed tibia fracture 06/23/2017     Priority: Medium     CAP (community acquired pneumonia) 06/13/2017     Priority: Medium     Vasovagal episode 10/25/2015     Priority: Medium     SIRS (systemic inflammatory response syndrome) (H) 10/24/2015     Priority: Medium     Major depressive disorder, recurrent episode (H) 03/04/2010     Priority: Medium        Past Surgical History:    Past Surgical History:   Procedure Laterality Date     APPENDECTOMY OPEN       ARTHROSCOPY SHOULDER ROTATOR CUFF REPAIR       cataracts Bilateral      CHOLECYSTECTOMY       COLONOSCOPY       CYSTOSCOPY  10/19/2011    Procedure:CYSTOSCOPY; CYSTOSCOPY, BLADDER STONE REMOVAL; Surgeon:ROB SAWYER; Location: OR     CYSTOSCOPY, TRANSURETHRAL RESECTION (TUR) PROSTATE, COMBINED N/A 2/21/2018    Procedure: COMBINED CYSTOSCOPY, TRANSURETHRAL RESECTION (TUR) PROSTATE;  COMBINED CYSTOSCOPY, TRANSURETHRAL RESECTION (TUR) PROSTATE ;  Surgeon: Rob Sawyer MD;  Location:  OR     EYE SURGERY      right lid surgery      JOINT REPLACEMENT Right     HIP     KNEE SURGERY Bilateral      LAMINECTOMY LUMBAR ONE LEVEL       TONSILLECTOMY          Family History:    family history includes Prostate Cancer in his father.    Social History:   reports that he has quit smoking. He has never used smokeless tobacco. He reports that he does not drink alcohol or use drugs.    PCP: Augustin Thomas     Review of Systems   Neurological: Positive for weakness.   All other systems reviewed and are negative.        Physical Exam     Patient Vitals for the past 24 hrs:   BP Temp Temp src Pulse Heart Rate Resp SpO2   01/15/20 0031 -- -- -- -- 87 13 --   01/15/20 0030 127/79 -- -- 87 -- -- --   01/15/20 0029 -- -- -- -- 87 20 --   01/15/20 0022 132/85 -- -- 85 87 12 94 %    01/15/20 0005 127/82 -- -- 88 -- -- 91 %   01/14/20 2358 -- 97.5  F (36.4  C) Oral -- -- -- --   01/14/20 2354 -- -- -- -- -- 18 90 %   01/14/20 2353 (!) 142/108 -- -- 90 -- -- --        Physical Exam  Vitals: reviewed by me  General: Pt seen on [facility], cooperative, and alert to conversation  Eyes: Tracking well, clear conjunctiva BL  ENT: MMM, midline trachea.   Lungs: No tachypnea, no accessory muscle use. No respiratory distress.   CV: Rate as above, regular rhythm.    Abd: Soft, non tender, no guarding, no rebound. Non distended  MSK: no peripheral edema or joint effusion.  No evidence of trauma  Skin: No rash, normal turgor and temperature  Neuro: Clear speech and no facial droop.  LUE with 2/5 motor  RUE and BLE with SILT and 5/5 motor (LLE limited 2/2 pain pt states)  Psych: Not RIS, no e/o AH/VH      Emergency Department Course     ECG sinus rhythm with a rate of 88, normal intervals apart from a right bundle branch block, no ectopy, no ST changes.    Imaging:  XR Chest Port 1 View   Final Result   IMPRESSION: No acute abnormality.      CT Head Perfusion w Contrast   Final Result   IMPRESSION:    HEAD CT:   1.  No CT finding of a mass, hemorrhage or focal area suggestive of acute infarct.   2.  Diffuse age related changes along with old lacunar infarcts basal ganglia bilaterally and right side of the [place].      HEAD CTA:    1.  No discrete aneurysm, vascular malformation, vessel occlusion or significant stenosis involving arteries of the [place].      NECK CTA:   1.  Bovine arch configuration of great vessels off aortic arch with no significant stenosis of their origins.   2.  No significant stenosis or irregularity involving the arteries of the neck by NASCET criteria.   3. No radiographic evidence of dissection.      CT PERFUSION:   1.  Relatively symmetric cerebral perfusion.         Head CT findings were communicated to Dr. Helm at 12:15 AM. CTA results were  communicated to Dr. Helm at 12:45 AM on 01/15/2020.      CTA Head Neck with Contrast   Final Result   IMPRESSION:    HEAD CT:   1.  No CT finding of a mass, hemorrhage or focal area suggestive of acute infarct.   2.  Diffuse age related changes along with old lacunar infarcts basal ganglia bilaterally and right side of the [place].      HEAD CTA:    1.  No discrete aneurysm, vascular malformation, vessel occlusion or significant stenosis involving arteries of the [place].      NECK CTA:   1.  Bovine arch configuration of great vessels off aortic arch with no significant stenosis of their origins.   2.  No significant stenosis or irregularity involving the arteries of the neck by NASCET criteria.   3. No radiographic evidence of dissection.      CT PERFUSION:   1.  Relatively symmetric cerebral perfusion.         Head CT findings were communicated to Dr. Helm at 12:15 AM. CTA results were communicated to Dr. Helm at 12:45 AM on 01/15/2020.      CT Head w/o Contrast   Final Result   IMPRESSION:    HEAD CT:   1.  No CT finding of a mass, hemorrhage or focal area suggestive of acute infarct.   2.  Diffuse age related changes along with old lacunar infarcts basal ganglia bilaterally and right side of the [place].      HEAD CTA:    1.  No discrete aneurysm, vascular malformation, vessel occlusion or significant stenosis involving arteries of the [place].      NECK CTA:   1.  Bovine arch configuration of great vessels off aortic arch with no significant stenosis of their origins.   2.  No significant stenosis or irregularity involving the arteries of the neck by NASCET criteria.   3. No radiographic evidence of dissection.      CT PERFUSION:   1.  Relatively symmetric cerebral perfusion.         Head CT findings were communicated to Dr. Helm at 12:15 AM. CTA results were communicated to Dr. Helm at 12:45 AM on 01/15/2020.           Laboratory:  Labs Ordered and Resulted from Time  of ED Arrival Up to the Time of Departure from the ED   BASIC METABOLIC PANEL - Abnormal; Notable for the following components:       Result Value    Carbon Dioxide 33 (*)     Anion Gap 2 (*)     Glucose 103 (*)     Calcium 7.9 (*)     All other components within normal limits   CBC WITH PLATELETS DIFFERENTIAL - Abnormal; Notable for the following components:    MCHC 29.9 (*)     RDW 15.3 (*)     All other components within normal limits   INR   PARTIAL THROMBOPLASTIN TIME   TROPONIN I   ROUTINE UA WITH MICROSCOPIC        Interventions:  Medications   0.9% sodium chloride BOLUS (1,000 mLs Intravenous New Bag 1/15/20 0133)   100mL Saline flush ( Intravenous Not Given 1/15/20 0133)   iopamidol (ISOVUE-370) solution 120 mL (120 mLs Intravenous Given 1/14/20 2359)   clopidogrel (PLAVIX) tablet 300 mg (300 mg Oral Given 1/15/20 0133)   aspirin (ASA) tablet 325 mg (325 mg Oral Given 1/15/20 0133)        Emergency Department Course:  Past medical records, nursing notes, and vitals reviewed.  I performed an exam of the patient and obtained history, as documented above.    Impression & Plan      Medical Decision Making:  This is an unfortunate 79-year-old male who presents the emergency room with appears to be left upper extremity weakness.  I do not think that he is an ideal TPA candidate, given his window over 4 hours, and improving left upper extremity weakness here.  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  Patient is mentating fine, protecting his airway, CT scan imaging as above, radiology tells me he has no evidence of actionable cause for his stroke on the imaging.  I spoke to Dr. Chan of the critical stroke team, and we have agreed to admit the patient for continued work-up and MRI.  I spoke to  who kindly agreed accept care of the patient.  Patient is also being worked up for metabolic causes, but does certainly need to be admitted for MRI  and continued cares.  I also spoke to patient's daughter who is okay with this plan.      Diagnosis:    ICD-10-CM    1. Left arm weakness R29.898 Troponin I          1/15/2020   Augustin Helm*        Augustin Helm MD  01/15/20 0210

## 2020-01-15 NOTE — H&P
Admitted:     01/14/2020      PRIMARY CARE PHYSICIAN:  Augustin Thomas MD      CHIEF COMPLAINT:  Left upper extremity weakness.      HISTORY OF PRESENT ILLNESS:  History was reviewed from the patient along with his daughter present by the bedside.  Mr. Ron Gilmore is a 79-year-old male who presented to the ER with left upper extremity weakness.  He does have prior history of stroke with some residual left upper extremity weakness and has mostly been bedbound.  He is able to maneuver himself from bed to power wheelchair, but tonight while going to bed, he noticed that his left arm was significantly weaker and was unable to maneuver himself from the power chair to the bed and slid to the ground.  EMS was called who brought the patient to the ER for further evaluation.  The family also notes that his speech has been slightly difficult to understand.  He denies any headache, no nausea or vomiting, no chest pain or shortness of breath.  Denies pain in abdomen.  Reports normal bowel and bladder habit.  He does modify his diet at baseline and takes thickened liquids.  In the ER, he was seen by Dr. Helm.  A code stroke was called.  Initial imagings were unremarkable.  Neurology was contacted who recommended aspirin and Plavix and Hospitalist was requested admission for further evaluation.      REVIEW OF SYSTEMS:  A 10-point review of system was done and was negative apart from those mentioned in the History of Present Illness.      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   2.  Hypertension.   3.  Depression.   4.  History of recurrent urinary tract infection.   5.  BPH.   6.  Chronic obstructive pulmonary disease.      MEDICATIONS PRIOR TO ADMISSION:  Medications Prior to Admission   Medication Sig Dispense Refill Last Dose     albuterol (2.5 MG/3ML) 0.083% neb solution Take 1 vial (2.5 mg) by nebulization every 2 hours as needed for shortness of breath / dyspnea or other (dyspnea) 360 mL 0 prn med      ALLOPURINOL PO Take 300 mg by mouth daily   1/14/2020 at am     ASPIRIN PO Take 81 mg by mouth daily   1/14/2020 at am     Emollient (AMLACTIN ULTRA EX) Externally apply topically daily APPLY TO FEET    1/14/2020 at am     fluocinonide-emollient (LIDEX-E) 0.05 % cream Apply topically 2 times daily as needed   prn med     metoprolol tartrate (LOPRESSOR) 25 MG tablet Take 12.5 mg by mouth 2 times daily   1/14/2020 at pm     PARoxetine HCl (PAXIL PO) Take 20 mg by mouth daily    1/14/2020 at am     tamsulosin (FLOMAX) 0.4 MG capsule Take 1 capsule (0.4 mg) by mouth every morning 14 capsule 0 1/14/2020 at am     order for DME Equipment being ordered: Other: Home nebulizer  Treatment Diagnosis: COPD/Pneumonia 1 Device 0 Taking        ALLERGIES:  NO KNOWN DRUG ALLERGIES.      SOCIAL HISTORY:  He denies smoking, alcohol or illicit drug use.  Ambulates with the help of a powered wheelchair.      FAMILY HISTORY:  Reviewed and not pertinent to current presentation.      PHYSICAL EXAMINATION:   GENERAL:  The patient is conscious, alert, awake, oriented x 3, has difficult to understand speech, lying comfortably in bed in no apparent distress.   VITAL SIGNS:  Temperature 97.5, heart rate of 87, blood pressure 127/79, saturation 94% on 2 liters was 98% on room air.   HEENT:  Pupils are equal and reactive to light and accommodation.  Extraocular movements are intact.  Oral mucosa is slightly dry.   NECK:  Supple, no raised JVD.   RESPIRATORY:  Lung sounds bilaterally clear to auscultation, no wheezes or crepitation.   CARDIOVASCULAR:  Normal S1, S2, regular rate and rhythm, no murmur.   ABDOMEN:  Soft, nontender, nondistended, no guarding, rigidity or rebound tenderness.   LOWER EXTREMITIES:  With no edema.   NEUROLOGIC:  He does have left arm weakness, 3-4/5.  He also has left lower extremity weakness, which he states is chronic.  Power in left lower extremity is around 3/5.  There is 5/5 in the right side.  Cranial nerves  II-XII otherwise grossly intact.   PSYCHIATRIC:  Normal mood and affect.      LABORATORY DATA:  Reviewed in Epic.     1.  CBC, BMP are mostly unremarkable except for calcium of 7.9.  Troponin negative.  Blood glucose 103.  2.  Chest x-ray reviewed shows no acute infiltrate, effusion or pneumothorax.     3.  CT head with and without contrast and CT angiogram of head and neck was mostly unremarkable, no acute findings of mass, hemorrhage or focal area suggestive of acute infarct.  No significant stenosis.   4.  EKG is pending.      ASSESSMENT AND PLAN:  Mr. Ron Gilmore is a 79-year-old male with history of a stroke with residual left upper extremity weakness, hypertension, history of VRE, depression, BPH, COPD, who presented to the ER with worsening left upper extremity weakness.     1.  Acute worsening of chronic left upper extremity weakness, likely cerebrovascular accident.   2.  Remote history of cerebrovascular accident with residual left upper extremity weakness.   3.  Hypertension.    - We will admit him as inpatient for likely stroke.  Initial CT and CTA are unremarkable.  We will monitor him on telemetry.  Getting aspirin and Plavix in the ER.  Will formally consult Neurology.  Will have neuro checks q.4 hours.  Obtain an MRI, echo with bubble studies.  Continue aspirin, start Lipitor.  We will check a lipid panel, A1c.  We will get PT, OT evaluation and  for disposition planning.  We will also get a Speech-Language Pathology evaluation.  IV fluids with normal saline at 100 mL per hour.  He is mostly bedbound at baseline and ambulates with the help of a motorized scooter and will allow permissive hypertension, labetalol p.r.n.  Will hold off on his PTA BP meds while allowing permissive hypertension.     4.  Chronic obstructive pulmonary disease.  Respiratory status is stable.  We will continue albuterol nebs p.r.n.   5.  Depression.  Resume Paxil when verified.   6.  Benign prostatic  hypertrophy:  Continue Flomax.   7.  Deep venous thrombosis prophylaxis:  Mechanical with PCD boots.      CODE STATUS:  Full code.         TIFFANY ARRIOLA MD             D: 01/15/2020   T: 01/15/2020   MT: KELSEA      Name:     SHERI THORPE   MRN:      [id number]        Account:      [id number]   :      1940        Admitted:     2020                   Document: [id number]       cc: Augustin Thomas MD

## 2020-01-15 NOTE — PLAN OF CARE
Discharge Planner SLP   Patient plan for discharge: Home with wife and PCA; pt does not wish to go to TCU  Current status: SLP: Baseline dysphagia with latest VFSS in 2018 finding silent aspiration of thin liquids, deep penetration and aspiration with cough on nectars. Pt reported strict compliance with honey thick liquids at home and regular diet, though admits to coughing with peanuts, corn, seasame seeds, and beans.     Oral motor exam significant for dysarthria and new left sided droop. Poor fitting dentures. Honey thick liquids by teaspoon and puree solids trialed with good timing, no overt s/sx of aspiration and no oral residue. Difficulty with liquids by cup and pt prefers to use nosey cup at home. Trialed regular textures with oral residue and suspect significant pharyngeal residue with multiple coughing episodes. Pt reported cough was not related to swallow, however, RN reported no baseline cough all day. Education provided to pt and family.     They verblized agreement with recommended: dysphagia diet level 2 and honey thick liquids with assist feeding, slow rate of intake, small bites and sips, alternate solids and liquids. Hold if overt s/sx of aspiration. Consider repeat VFSS if pt would like to advance to a regular diet.     Barriers to return to prior living situation: Dysphagia  Recommendations for discharge: Home with HH SLP  Rationale for recommendations: Pt below baseline. SLP plan above       Entered by: [place] May 01/15/2020 5:04 PM

## 2020-01-15 NOTE — ED TRIAGE NOTES
7pm left side weakness.  Fell, called EMS.  EMS stated he had slurred speech without ability to move left side.  90/75.  Prior stroke with residual shakiness on left arm.

## 2020-01-15 NOTE — ED NOTES
[facility]  ED Nurse Handoff Report    ED Chief complaint: Extremity Weakness and Slurred Speech      ED Diagnosis:   Final diagnoses:   Left arm weakness       Code Status:  MD to address    Allergies: No Known Allergies    Patient Story:  Left weakness, fell.  Onset at 7pm.  Slurred speech.   Focused Assessment:   Left arm weakness.  Baseline weakness to left arm for previous stroke.  Speech is clear.  NPO at this time.  Daughter states they normally thicken his liquids at home.  CT negative.  Admit & follow with neuro & plan for MRI.      Treatments and/or interventions provided:  Aspirin & Plavix given  Following this he is now NPO.    Patient's response to treatments and/or interventions:     To be done/followed up on inpatient unit:  [facility].      Does this patient have any cognitive concerns?: None at this time.     Activity level - Baseline/Home:  Stand with Assist  Activity Level - Current:   Not assessed    Patient's Preferred language: English   Needed?: No    Isolation: None and Contact   Infection: Not Applicable  VRE  Bariatric?: Yes    Vital Signs:   Vitals:    01/15/20 0215 01/15/20 0230 01/15/20 0245 01/15/20 0300   BP:    118/67   Pulse:    77   Resp: 13 16 14 11   Temp:       TempSrc:       SpO2:           Cardiac Rhythm:     Was the PSS-3 completed:   Yes  What interventions are required if any?               Family Comments:  Daughter with patient.   OBS brochure/video discussed/provided to patient/family: N/A              Name of person given brochure if not patient:               Relationship to patient:     For the majority of the shift this patient's behavior was Green.   Behavioral interventions performed were     ED NURSE PHONE NUMBER:  [phone]

## 2020-01-15 NOTE — PROGRESS NOTES
RECEIVING UNIT ED HANDOFF REVIEW    ED Nurse Handoff Report was reviewed by: Crystal Osorio RN on January 15, 2020 at 2:29 PM

## 2020-01-16 ENCOUNTER — APPOINTMENT (OUTPATIENT)
Dept: SPEECH THERAPY | Facility: CLINIC | Age: 80
DRG: 040 | End: 2020-01-16
Attending: HOSPITALIST
Payer: COMMERCIAL

## 2020-01-16 ENCOUNTER — APPOINTMENT (OUTPATIENT)
Dept: CARDIOLOGY | Facility: CLINIC | Age: 80
DRG: 040 | End: 2020-01-16
Attending: HOSPITALIST
Payer: COMMERCIAL

## 2020-01-16 ENCOUNTER — APPOINTMENT (OUTPATIENT)
Dept: SPEECH THERAPY | Facility: CLINIC | Age: 80
DRG: 040 | End: 2020-01-16
Payer: COMMERCIAL

## 2020-01-16 ENCOUNTER — APPOINTMENT (OUTPATIENT)
Dept: OCCUPATIONAL THERAPY | Facility: CLINIC | Age: 80
DRG: 040 | End: 2020-01-16
Attending: HOSPITALIST
Payer: COMMERCIAL

## 2020-01-16 LAB
CHOLEST SERPL-MCNC: 87 MG/DL
GLUCOSE BLDC GLUCOMTR-MCNC: 84 MG/DL (ref 70–99)
GLUCOSE BLDC GLUCOMTR-MCNC: 86 MG/DL (ref 70–99)
HDLC SERPL-MCNC: 30 MG/DL
LDLC SERPL CALC-MCNC: 40 MG/DL
NONHDLC SERPL-MCNC: 57 MG/DL
TRIGL SERPL-MCNC: 83 MG/DL

## 2020-01-16 PROCEDURE — 00000146 ZZHCL STATISTIC GLUCOSE BY METER IP

## 2020-01-16 PROCEDURE — 92507 TX SP LANG VOICE COMM INDIV: CPT | Mod: GN | Performed by: SPEECH-LANGUAGE PATHOLOGIST

## 2020-01-16 PROCEDURE — 87086 URINE CULTURE/COLONY COUNT: CPT | Performed by: INTERNAL MEDICINE

## 2020-01-16 PROCEDURE — 97166 OT EVAL MOD COMPLEX 45 MIN: CPT | Mod: GO | Performed by: OCCUPATIONAL THERAPIST

## 2020-01-16 PROCEDURE — 99207 ZZC CDG-MDM COMPONENT: MEETS MODERATE - UP CODED: CPT | Performed by: INTERNAL MEDICINE

## 2020-01-16 PROCEDURE — 36415 COLL VENOUS BLD VENIPUNCTURE: CPT | Performed by: HOSPITALIST

## 2020-01-16 PROCEDURE — 92522 EVALUATE SPEECH PRODUCTION: CPT | Mod: GN | Performed by: SPEECH-LANGUAGE PATHOLOGIST

## 2020-01-16 PROCEDURE — 92526 ORAL FUNCTION THERAPY: CPT | Mod: GN | Performed by: SPEECH-LANGUAGE PATHOLOGIST

## 2020-01-16 PROCEDURE — 97530 THERAPEUTIC ACTIVITIES: CPT | Mod: GO | Performed by: OCCUPATIONAL THERAPIST

## 2020-01-16 PROCEDURE — 25800030 ZZH RX IP 258 OP 636: Performed by: HOSPITALIST

## 2020-01-16 PROCEDURE — 12000000 ZZH R&B MED SURG/OB

## 2020-01-16 PROCEDURE — 25000132 ZZH RX MED GY IP 250 OP 250 PS 637: Performed by: HOSPITALIST

## 2020-01-16 PROCEDURE — 93306 TTE W/DOPPLER COMPLETE: CPT

## 2020-01-16 PROCEDURE — 80061 LIPID PANEL: CPT | Performed by: HOSPITALIST

## 2020-01-16 PROCEDURE — 99233 SBSQ HOSP IP/OBS HIGH 50: CPT | Performed by: INTERNAL MEDICINE

## 2020-01-16 PROCEDURE — 25000132 ZZH RX MED GY IP 250 OP 250 PS 637: Performed by: PSYCHIATRY & NEUROLOGY

## 2020-01-16 PROCEDURE — 99222 1ST HOSP IP/OBS MODERATE 55: CPT | Performed by: PSYCHIATRY & NEUROLOGY

## 2020-01-16 PROCEDURE — 25500064 ZZH RX 255 OP 636: Performed by: HOSPITALIST

## 2020-01-16 PROCEDURE — 93306 TTE W/DOPPLER COMPLETE: CPT | Mod: 26 | Performed by: INTERNAL MEDICINE

## 2020-01-16 RX ORDER — ATORVASTATIN CALCIUM 10 MG/1
10 TABLET, FILM COATED ORAL EVERY EVENING
Status: DISCONTINUED | OUTPATIENT
Start: 2020-01-16 | End: 2020-01-23 | Stop reason: HOSPADM

## 2020-01-16 RX ORDER — CLOPIDOGREL BISULFATE 75 MG/1
75 TABLET ORAL DAILY
Status: DISCONTINUED | OUTPATIENT
Start: 2020-01-16 | End: 2020-01-23 | Stop reason: HOSPADM

## 2020-01-16 RX ORDER — CLOPIDOGREL BISULFATE 75 MG/1
300 TABLET ORAL ONCE
Status: DISCONTINUED | OUTPATIENT
Start: 2020-01-16 | End: 2020-01-16

## 2020-01-16 RX ADMIN — ASPIRIN 325 MG: 325 TABLET, DELAYED RELEASE ORAL at 10:13

## 2020-01-16 RX ADMIN — SODIUM CHLORIDE: 9 INJECTION, SOLUTION INTRAVENOUS at 20:33

## 2020-01-16 RX ADMIN — SODIUM CHLORIDE: 9 INJECTION, SOLUTION INTRAVENOUS at 05:41

## 2020-01-16 RX ADMIN — HUMAN ALBUMIN MICROSPHERES AND PERFLUTREN 9 ML: 10; .22 INJECTION, SOLUTION INTRAVENOUS at 14:20

## 2020-01-16 RX ADMIN — CLOPIDOGREL BISULFATE 75 MG: 75 TABLET ORAL at 10:13

## 2020-01-16 RX ADMIN — ATORVASTATIN CALCIUM 10 MG: 10 TABLET, FILM COATED ORAL at 20:28

## 2020-01-16 ASSESSMENT — ACTIVITIES OF DAILY LIVING (ADL)
ADLS_ACUITY_SCORE: 27
ADLS_ACUITY_SCORE: 20
ADLS_ACUITY_SCORE: 18
ADLS_ACUITY_SCORE: 20
ADLS_ACUITY_SCORE: 27
ADLS_ACUITY_SCORE: 25

## 2020-01-16 NOTE — PLAN OF CARE
Discharge Planner PT   Patient plan for discharge: unsure  Current status: PT orders received and chart reviewed. Per chart review and discussion with OT, patient is non-ambulatory at baseline, only slide transfers to an electric wheelchair.   Barriers to return to prior living situation: defer to OT Eval  Recommendations for discharge: defer to OT  Rationale for recommendations: Acute therapy needs for functional transfers being met by OT. PT orders completed.        Entered by: Sasha Yin 01/16/2020 1:13 PM

## 2020-01-16 NOTE — PROGRESS NOTES
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 Nonambulatory 2/2 back surgery and knee problems. Largely bedbound but able to transfer to power wheelchair, evening of presentation he noted L arm significantly weaker. Per family also garbled speech. Code CVA called in the ED. MRI with acute/ subacute infarct in the posterior R basal ganglia/ corona radiata region.    - neurology consult  - plavix loaded and continue 75 mg daily  -  mg daily  - q4 neuro checks  - telemetry  - LDL 40/ HDL 30/ TG 83, troponin negative, A1C at 6.0%  - echocardiogram very difficult study, unable to assess WMA. Bubble negative but poor quality.   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 Afebrile with no leukocytosis  - await cultures  - hold abx for now    BPH  PTA on tamsulosin 0.4 mg daily, continue    COPD  PTA on prn albuterol nebs, continue    FEN (fluids, electrolytes and nutrition): diet as per slp, IV fluids until taking adequate PO  Discussed with nursing.  DVT Prophylaxis: Pneumatic Compression Devices  Code Status: Full Code    Disposition: Expected discharge in 1- days pending neuro consult and therapies    Rg Veloz MD  [phone] (P)  Text Page    Interval History   Overnight events reviewed. Denies cp/sob. Still with dense paresis of L arm.     -Data reviewed today: I reviewed all new labs and imaging results over the last 24 hours. I personally reviewed no images or EKG's today.    Physical Exam   Temp: 97.3  F (36.3  C) Temp src: Oral BP: (!) 143/67  Pulse: 76 Heart Rate: 85 Resp: 16 SpO2: 93 % O2 Device: Nasal cannula Oxygen Delivery: 2 LPM  Vitals:    01/15/20 1810   Weight: 122.7 kg (270 lb 6.4 oz)     Vital Signs with Ranges  Temp:  [96.8  F (36  C)-98.1  F (36.7  C)] 97.3  F (36.3  C)  Pulse:  [76] 76  Heart Rate:  [77-92] 85  Resp:  [12-16] 16  BP: (115-162)/(65-85) 143/67  SpO2:  [92 %-97 %] 93 %  No intake/output data recorded.    Constitutional: Alert, oriented, no acute distress  Respiratory: Lungs clear to auscultation bilaterally, no wheezes, no crackles  Cardiovascular: Regular rate and rhythm, no murmurs  GI: Soft, non-tender, non-disteneded, good bowel sounds  Skin/Integumen: No erythema, cyanosis or edema  Other: L facial droop, paretic L arm, slight motion in 4th and 5th digits    Medications     - MEDICATION INSTRUCTIONS -       sodium chloride 100 mL/hr at 01/16/20 1014       aspirin  325 mg Oral Daily    Or     aspirin  300 mg Rectal Daily     atorvastatin  10 mg Oral QPM     clopidogrel  75 mg Oral Daily     sodium chloride (PF)  3 mL Intracatheter Q8H       Data   Recent Labs   Lab 01/14/20  2356   WBC 8.7   HGB 13.4   MCV 93      INR 1.13      POTASSIUM 4.2   CHLORIDE 103   CO2 33*   BUN 24   CR 0.86   ANIONGAP 2*   RAJ 7.9*   *   TROPI <0.015       Recent Results (from the past 24 hour(s))   MRI Brain w & w/o contrast    Narrative    MRI BRAIN WITHOUT AND WITH CONTRAST  1/15/2020 9:22 PM     HISTORY:  Stroke, follow up.     TECHNIQUE:  Multiplanar, multisequence MRI of the brain without and  with 12 mL Gadavist.     COMPARISON: Head CT 1/14/2020. Brain MR 7/13/2006.     FINDINGS: Images are degraded by motion artifact.    Area of restricted diffusion in the posterior right basal  ganglia/corona radiata region compatible with acute infarct. No  associated susceptibility hypointensity to suggest hemorrhagic  transformation. No significant mass effect or midline shift. No  evidence of acute intracranial hemorrhage.  Moderate diffuse  parenchymal volume loss. Patchy periventricular white matter T2  hyperintensities are nonspecific, but likely related to chronic  microvascular ischemic disease. Ventricular size is within normal  limits without evidence of hydrocephalus. Presumed prominent  perivascular spaces in the basal ganglia bilaterally.    Foci of susceptibility hypodensity in the left thalamus and right  cerebellum probably representing chronic microhemorrhages.    There is no abnormal intracranial enhancement.     The facial structures appear normal.       Impression    IMPRESSION:    1. Acute/subacute infarct in the posterior right basal ganglia/corona  radiata region. No evidence of hemorrhagic transformation. No  significant mass effect or midline shift.   2. Moderate diffuse parenchymal volume loss and white matter changes  likely due to chronic microvascular ischemic disease.  3. Probable chronic microimages in the left thalamus and right  cerebellum.      ASHTYN ARCHER MD

## 2020-01-16 NOTE — PROGRESS NOTES
01/16/20 1000   Quick Adds   Type of Visit Initial Occupational Therapy Evaluation   Living Environment   Lives With spouse   Living Environment Comment Pt lives with wife, with PCA 3 hours/day in AM for getting into chair, getting breakfast. Pt uses power wheelchair at baseline. Wife has bad shoulder, and cancer.   Self-Care   Usual Activity Tolerance fair   Current Activity Tolerance fair   Equipment Currently Used at Home wheelchair, power;hospital bed   Activity/Exercise/Self-Care Comment Pt normally transfers self wheelchair to bed, using BUE to boost self over.     Functional Level   Ambulation 1-->assistive equipment   Transferring 1-->assistive equipment   Toileting 2-->assistive person   Bathing 2-->assistive person   Dressing 2-->assistive person   Eating 0-->independent   Cognition 0 - no cognition issues reported   Fall history within last six months yes   Number of times patient has fallen within last six months 1   Which of the above functional risks had a recent onset or change? ambulation;transferring;toileting;bathing;dressing   Prior Functional Level Comment Pt has PCA to assist with dressing in AM.  Pt uses urinal and bedpan, does not use toilet.  Pt can use bedpan independently at baseline, or wife assists   General Information   Onset of Illness/Injury or Date of Surgery - Date 01/15/20   Referring Physician David Rodríguez   Patient/Family Goals Statement prefers home   Additional Occupational Profile Info/Pertinent History of Current Problem 79-year-old male who presented to the ER with left upper extremity weakness   Precautions/Limitations fall precautions   Weight-Bearing Status - LUE full weight-bearing   Weight-Bearing Status - RUE full weight-bearing   Weight-Bearing Status - LLE full weight-bearing   Weight-Bearing Status - RLE full weight-bearing   Heart Disease Risk Factors Lack of physical activity;Overweight;Medical history;Gender;Age   General Observations Pt seated in bed upon  arrival; pt agreeable to OT session, with encouragement.   General Info Comments Activity order: up with assist if neurologically stable   Cognitive Status Examination   Orientation person;place;time   Level of Consciousness alert   Follows Commands (Cognition) follows one step commands   Cognitive Comment Pt can be difficult to understand, but is conversational and appropriate   Sensory Examination   Sensory Quick Adds No deficits were identified   Integumentary/Edema   Integumentary/Edema Comments Mild edema LUE; still able to swing ring easily   Posture   Posture forward head position;protracted shoulders   Range of Motion (ROM)   ROM Comment RUE WFL; LUE WFL PROM   Strength   Strength Comments RUE generally 4/5; LUE 1/5, min movement shoulder shrug   Hand Strength   Left hand  (pounds) 0 pounds   Right hand  (pounds) 0 pounds   Coordination   Upper Extremity Coordination Left UE impaired   Functional Limitations Impaired ability to perform bilateral tasks   Mobility   Bed Mobility Bed mobility skill: Scooting/Bridging;Bed mobility skill: Sit to supine;Bed mobility skill: Supine to sit   Bed Mobility Skill: Scooting/Bridging   Level of [place]: Scooting/Bridging moderate assist (50% patients effort)   Physical Assist/Nonphysical Assist: Scooting/Bridging 1 person assist;verbal cues;supervision;set-up required   Assistive Device: Scooting/Bridging bedrail   Bed Mobility Skill: Sit to Supine   Level of [place]: Sit/Supine moderate assist (50% patients effort)   Physical Assist/Nonphysical Assist: Sit/Supine 2 persons;verbal cues;supervision;set-up required   Assistive Device: Sit/Supine bedrail   Bed Mobility Skill: Supine to Sit   Level of [place]: Supine/Sit minimum assist (75% patients effort)   Physical Assist/Nonphysical Assist: Supine/Sit 2 persons;verbal cues;supervision;set-up required   Assistive Device: Supine/Sit bedrail   Transfer Skills   Transfer Comments Pt transfers self  wheelchair <> bed at baseline, using BUE to support self and boost over.   Transfer Skill: Toilet Transfer   Toilet Transfer Skill Comments Pt uses urinal and bedpan at baseline   Tub/Shower Transfer   Tub/Shower Transfer Comments Pt sponge bathes   Balance   Balance Comments Pt able to sit EOB a few minutes, but needing significant RUE support   Bathing   Level of [place] maximum assist (25% patients effort)   Physical Assist/Nonphysical Assist 1 person assist   Upper Body Dressing   Level of [place]: Dress Upper Body maximum assist (25% patients effort)   Physical Assist/Nonphysical Assist: Dress Upper Body 1 person assist   Lower Body Dressing   Level of [place]: Dress Lower Body maximum assist (25% patients effort)   Physical Assist/Nonphysical Assist: Dress Lower Body 1 person assist   Toileting   Level of [place]: Toilet moderate assist (50% patients effort)   Physical Assist/Nonphysical Assist: Toilet 1 person assist   Instrumental Activities of Daily Living (IADL)   IADL Comments Spouse and PCA complete   Activities of Daily Living Analysis   Impairments Contributing to Impaired Activities of Daily Living balance impaired;coordination impaired;ROM decreased;strength decreased;muscle tone abnormal   General Therapy Interventions   Planned Therapy Interventions ADL retraining;ROM;strengthening;transfer training   Clinical Impression   Criteria for Skilled Therapeutic Interventions Met yes, treatment indicated   OT Diagnosis impaired ADLs   Influenced by the following impairments weakness, decreased balance, decreased ROM   Assessment of Occupational Performance 3-5 Performance Deficits   Identified Performance Deficits dressing, toileting, bathing   Clinical Decision Making (Complexity) Moderate complexity   Therapy Frequency Daily   Predicted Duration of Therapy Intervention (days/wks) 3 days   Anticipated Discharge Disposition Transitional Care Facility   Risks and Benefits of Treatment  "have been explained. Yes   Patient, Family & other staff in agreement with plan of care Yes   [facility] AM-PAC  \"6 Clicks\" Daily Activity Inpatient Short Form   1. Putting on and taking off regular lower body clothing? 2 - A Lot   2. Bathing (including washing, rinsing, drying)? 2 - A Lot   3. Toileting, which includes using toilet, bedpan or urinal? 2 - A Lot   4. Putting on and taking off regular upper body clothing? 2 - A Lot   5. Taking care of personal grooming such as brushing teeth? 2 - A Lot   6. Eating meals? 3 - A Little   Daily Activity Raw Score (Score out of 24.Lower scores equate to lower levels of function) 13   Total Evaluation Time   Total Evaluation Time (Minutes) 8     "

## 2020-01-16 NOTE — CONSULTS
"  [facility]    Stroke Consult Note    Reason for Consult:  Left sided weakness     Chief Complaint: Extremity Weakness and Slurred Speech       HPI  Ron Gilmore is a 79 year old male PMHx HTN presents with 36 hours of left sided weakness.   Has history of previous stroke with some residual LUE weakness, he has been wheel chair bound since then. Pt developed new onset left sided weakness 2 days ago and arrived to ED 36 hours post sxs onset. Out of tPA time window    Impression  MRI shows R posterior limb of IC infarct. Embolic vs. Small vessel.  Etiology ESUS.        Recommendations  [] Cont Asa 81 and plavix 75 mg for 3 weeks, then  mg qday monotherapy  [] Discuss [place]A tomorrow   [] LDL is 40, A1c of 6  [] Start lipitor 10 mg qday   [] PT/OT/SLP  [] possible loop recorder placement with cardiology      Keyla Valencia MD  Vascular Neurology Fellow  01/16/2020 4:11 PM    To page stroke neurology, click here: [facility]  Choose \"On Call\" tab at top, then search dropdown box for \"Neurology Adult\" & press Enter, look for Neuro ICU/Stroke    Physician Attestation   I, Tavares Matthews, saw Ron Gilmore with the fellow and agree with the fellow's findings and plan of care as documented in the fellow's note.      I personally reviewed vital signs, medications, labs, and imaging.    Key findings: Patient with left sided weakness and RIGHT anterior choroidal artery infarction. Can be small vessel vs. Embolic. Needs further rhythm monitoring. Does not wear CPAP though has been advised to do this in the past. Will likely need rehab for OT and SLP. Wheelchair bound at baseline and not going to start walking again.     Tavares Matthews MD  Vascular Neurology/Neurocritical Care  Text Page - 0722    Date of Service (when I saw the patient): 01/16/20      ____________    Past Medical History   Past Medical History:   Diagnosis Date     BPH (benign prostatic hyperplasia)      Cataract      Cholelithiasis  "     COPD (chronic obstructive pulmonary disease) (H)      Depression      Diabetes mellitus     Type 2     Dyshidrotic foot dermatitis      Edema      Gout      Hyperlipidemia      Hypertension      Kidney stones     Bladder Stones     Lumbago      Lumbar disc displacement without myelopathy      Muscle weakness      Neuropathy, diabetic (H)      Obesity      Spinal stenosis      Stroke (H)     with residual effects- weakness LUE> LLE     Unsteady gait      Urinary retention with incomplete bladder emptying      UTI (urinary tract infection)      Past Surgical History   Past Surgical History:   Procedure Laterality Date     APPENDECTOMY OPEN       ARTHROSCOPY SHOULDER ROTATOR CUFF REPAIR       cataracts Bilateral      CHOLECYSTECTOMY       COLONOSCOPY       CYSTOSCOPY  10/19/2011    Procedure:CYSTOSCOPY; CYSTOSCOPY, BLADDER STONE REMOVAL; Surgeon:ROB SAWYER; Location: OR     CYSTOSCOPY, TRANSURETHRAL RESECTION (TUR) PROSTATE, COMBINED N/A 2/21/2018    Procedure: COMBINED CYSTOSCOPY, TRANSURETHRAL RESECTION (TUR) PROSTATE;  COMBINED CYSTOSCOPY, TRANSURETHRAL RESECTION (TUR) PROSTATE ;  Surgeon: Rob Sawyer MD;  Location:  OR     EYE SURGERY      right lid surgery      JOINT REPLACEMENT Right     HIP     KNEE SURGERY Bilateral      LAMINECTOMY LUMBAR ONE LEVEL       TONSILLECTOMY       Medications   Home Meds  Prior to Admission medications    Medication Sig Start Date End Date Taking? Authorizing Provider   albuterol (2.5 MG/3ML) 0.083% neb solution Take 1 vial (2.5 mg) by nebulization every 2 hours as needed for shortness of breath / dyspnea or other (dyspnea) 6/17/17  Yes Wali Ibrahim MD   ALLOPURINOL PO Take 300 mg by mouth daily   Yes Unknown, Entered By History   ASPIRIN PO Take 81 mg by mouth daily   Yes Reported, Patient   Emollient (AMLACTIN ULTRA EX) Externally apply topically daily APPLY TO FEET    Yes Reported, Patient   fluocinonide-emollient (LIDEX-E) 0.05 % cream Apply topically 2  times daily as needed   Yes Reported, Patient   metoprolol tartrate (LOPRESSOR) 25 MG tablet Take 12.5 mg by mouth 2 times daily   Yes Unknown, Entered By History   PARoxetine HCl (PAXIL PO) Take 20 mg by mouth daily    Yes Unknown, Entered By History   tamsulosin (FLOMAX) 0.4 MG capsule Take 1 capsule (0.4 mg) by mouth every morning 6/28/19  Yes Maria D Sanchez MD   order for DME Equipment being ordered: Other: Home nebulizer  Treatment Diagnosis: COPD/Pneumonia 6/17/17   Wali Ibrahim MD       Scheduled Meds    aspirin  325 mg Oral Daily    Or     aspirin  300 mg Rectal Daily     atorvastatin  10 mg Oral QPM     clopidogrel  75 mg Oral Daily     sodium chloride (PF)  3 mL Intracatheter Q8H       Infusion Meds    - MEDICATION INSTRUCTIONS -       sodium chloride 100 mL/hr at 01/16/20 1014       PRN Meds  acetaminophen, bisacodyl, labetalol, lidocaine 4%, lidocaine (buffered or not buffered), magnesium hydroxide, - MEDICATION INSTRUCTIONS -, melatonin, naloxone, ondansetron **OR** ondansetron, prochlorperazine **OR** prochlorperazine **OR** prochlorperazine, senna-docusate **OR** senna-docusate, sodium chloride (PF)    Allergies   No Known Allergies  Family History   Family History   Problem Relation Age of Onset     Prostate Cancer Father      Social History   Social History     Tobacco Use     Smoking status: Former Smoker     Smokeless tobacco: Never Used   Substance Use Topics     Alcohol use: No     Comment: none for 29 yrs     Drug use: No       Review of Systems   The 10 point Review of Systems is negative other than noted in the HPI or here.        PHYSICAL EXAMINATION   Temp:  [96.8  F (36  C)-98.1  F (36.7  C)] 97.3  F (36.3  C)  Heart Rate:  [85-92] 85  Resp:  [16] 16  BP: (115-162)/(65-85) 143/67  SpO2:  [92 %-97 %] 93 %    General:  patient lying in bed without any acute distress    HEENT:  normocephalic/atraumatic  Cardio:  RRR  Pulmonary:  no respiratory distress  Abdomen: soft non  tender  Extremities: pulses intact  Skin:  no lesions     Neurologic  Mental Status:  alert, oriented x 3, follows commands, speech clear and fluent, naming and repetition normal  Cranial Nerves:  visual fields intact, EOMI with normal smooth pursuit, hearing not formally tested but intact to conversation, no dysarthria, shoulder shrug equal bilaterally, tongue protrusion midline, no facial droop. Sensory equal bilaterally in v/1/2/3 distribution  Motor:  antigravity in bilateral lower extremities.   Dysphagia Screen  Per Nursing     Imaging  I personally reviewed all imaging; relevant findings per HPI.    Labs Data   CBC  Recent Labs   Lab 01/14/20  2356   WBC 8.7   RBC 4.83   HGB 13.4   HCT 44.8        Basic Metabolic Panel   Recent Labs   Lab 01/14/20  2356      POTASSIUM 4.2   CHLORIDE 103   CO2 33*   BUN 24   CR 0.86   *   RAJ 7.9*     Liver Panel  Recent Labs   Lab Test 10/12/18  2032 10/04/17  1400 10/25/15  1005   PROTTOTAL 6.1* 7.2 6.2*   ALBUMIN 2.3* 3.1* 2.9*   BILITOTAL 0.5 0.5 0.8   ALKPHOS 57 83 48   AST 22 11 18   ALT 34 19 31     INR  Recent Labs   Lab Test 01/14/20  2356   INR 1.13      Lipid Profile  Recent Labs   Lab Test 01/16/20  0735   CHOL 87   HDL 30*   LDL 40   TRIG 83     A1C  Recent Labs   Lab Test 01/14/20  2356   A1C 6.0*     Troponin I  Recent Labs   Lab 01/14/20  2356   TROPI <0.015

## 2020-01-16 NOTE — PLAN OF CARE
Discharge Planner SLP   Patient plan for discharge: Wants to return home, but has an excellent understanding of new deficits and concerns with ADLs  Current status: Swallow: Delayed swallow and cough x1. Pt reported cough was again not related to swallow, however, given education, pt did agree that swallow function may be worse than normal and did agree to repeat Video Swallow Study.     Cautiously continue dysphagia diet level 2 and honey thick liquids with assist feeding, slow rate of intake, small bites and sips, alternate solids and liquids. Meds in puree. Hold if overt s/sx of aspiration. VFSS orders placed.     Speech eval: SLP: Pt presents with moderate dysarthria in informal speech evaluation. Excellent recall of medical hx and pt denied any changes in language/cognitive abilities. Left droop on exam. Subjectively, pt approximately 95% intelligible in conversation with frequent self-corrections to improve intelligibility. Pt responsive to cues to slow rate and over articulate. Will continue to follow for swallow and speech goals.     Barriers to return to prior living situation: Dysphagia; dysarthria  Recommendations for discharge: TCU  Rationale for recommendations: Plan VFSS and speech evaluation       Entered by: Socorro May 01/16/2020 10:42 AM

## 2020-01-16 NOTE — PLAN OF CARE
VSS, BP elevated.  A&O x4. Pt has residual hemiparesis from previous stroke. Now has increase hemiparesis on left side, slight contraction. No effort against gravity on left side. Left facial droop. Slight slurred speak. Tele SR with PVC. NS infusing @ 100 ml/hr. DD2 with honey thicken diet. Abrasion on right knee, foam dressing in place. MRI and echo ordered. Will continue to monitor.

## 2020-01-16 NOTE — PROVIDER NOTIFICATION
MD Notification    Notified Person: MD    Notified Person Name:Petar    Notification Date/Time: 1/16/20 0330    Notification Interaction: text page    Purpose of Notification:informing MD of MRI confirming stroke    Orders Received: no changes    Comments:

## 2020-01-16 NOTE — PLAN OF CARE
Discharge Planner OT   Patient plan for discharge: strongly prefers home with spouse and resumed PCA  Current status: evaluation completed, treatment initiated.  Patient lives with his wife and has PCA 3 hours/day for getting into his chair, dressing, and getting breakfast.  Pt reports he uses urinal and bedpan at baseline, generally independently.  Pt reports transferring self between bed and wheelchair, using BUE for boosting self over.  Pt reports wife has poor shoulder/needs surgery and will be unable to assist pt as much as he currently needs.    Today patient MinAx1 and ModAx1 supine <> sit.  Pt sat EOB for a few minutes, with significant RUE support to maintain midline, and generally needing assist to maintain midline.  Pt able to swing BLE back into bed SBA by supporting LLE with RLE.  Pt able to reposition self in bed to midline with multiple VC and using bedrails; pt able to boost to HOB with ModA for L side.  Pt able to shrug L shoulder minimally, otherwise LUE flaccid.  Pt able to adduct LLE over to EOB for transfer. Pt with previous poor experience with rehab (broke leg while in therapy), but participated with encouragement and pleasant/conversational throughout session.    Barriers to return to prior living situation: weakness, current level of assist, limited support at home  Recommendations for discharge: TCU  Rationale for recommendations: patient below baseline, and needing more assist than currently available at home. Pt would benefit from continued therapy in hospital and rehab to increase safety and independence with ADLs/transfers prior to returning home.       Entered by: ANNETTE BECKWITH 01/16/2020 11:27 AM

## 2020-01-16 NOTE — PLAN OF CARE
A&O to self only. VSS. Up with A1-2 and GB/walker. Tolerating regular diet now NPO for LP later this morning. IV SL.  Pt denies pain. Plan for LP today. Continue to monitor.

## 2020-01-16 NOTE — PLAN OF CARE
Pt here with acute stroke. A&O. Neuros - L sided hemiparesis, LUE 1/5 and LLE 2/5, L sided facial droop, slurred speech. VSS. Tele NSR. DD2 diet, honey thickened liquids. Takes pills whole. Up with A2/lift. Denies pain. Pt scoring green on the Aggression Stop Light Tool. Plan for neuro, PT/OT, and speech consult . Discharge pending.

## 2020-01-16 NOTE — PROGRESS NOTES
01/16/20 1603   General Information, SLP   Type of Evaluation Dysarthria   Type of Visit Initial   Start of Care Date 01/15/20   Onset of Illness/Injury or Date of Surgery - Date 01/15/20   Patient/Family Goals Statement improve speech intelligibility   Pertinent History of Current Problem CVA; hx of CVA   Precautions/Limitations swallowing precautions   Oral Motor Sensory Function   Completed on Swallow Evaluation Completed Clinical Bedside Swallow Evaluation   Speech   Deficits in Speech Respiration None   Deficits in Phonation Harsh quality;Low pitch   Deficits in Articulation Flaccid dysarthria   Speech Comments 95% intelligible in conversation   Cognitive Status Examination   Attention intact   Clinical Impression, SLP Eval   Criteria for Skilled Therapeutic Interventions Met Yes   SLP Diagnosis dysarthria   Rehab Potential Good, to achieve stated therapy goals   Therapy Frequency Daily   Predicted Duration of Therapy Intervention (days/wks) 1 week   Anticipated Discharge Disposition Transitional Care Facility   Risks and Benefits of Treatment have been explained. Yes   Patient, Family & other staff in agreement with plan of care Yes   Clinical Impression Comments SLP: Pt presents with moderate dysarthria in informal speech evaluation. Excellent recall of medical hx and pt denied any changes in language/cognitive abilities. Left droop on exam. Subjectively, pt approximately 95% intelligible in conversation with frequent self-corrections to improve intelligibility. Pt responsive to cues to slow rate and over articulate. Will continue to follow for swallow and speech goals.    Total Evaluation Time      Total Evaluation Time (Minutes) 8

## 2020-01-17 ENCOUNTER — APPOINTMENT (OUTPATIENT)
Dept: SPEECH THERAPY | Facility: CLINIC | Age: 80
DRG: 040 | End: 2020-01-17
Payer: COMMERCIAL

## 2020-01-17 ENCOUNTER — APPOINTMENT (OUTPATIENT)
Dept: EDUCATION SERVICES | Facility: CLINIC | Age: 80
End: 2020-01-17
Payer: COMMERCIAL

## 2020-01-17 PROBLEM — I63.9 CEREBROVASCULAR ACCIDENT (CVA), UNSPECIFIED MECHANISM (H): Status: ACTIVE | Noted: 2020-01-14

## 2020-01-17 LAB
BACTERIA SPEC CULT: NORMAL
BASE EXCESS BLDA CALC-SCNC: 6.4 MMOL/L
HCO3 BLD-SCNC: 34 MMOL/L (ref 21–28)
Lab: NORMAL
O2/TOTAL GAS SETTING VFR VENT: ABNORMAL %
OXYHGB MFR BLD: 90 % (ref 92–100)
PCO2 BLD: 59 MM HG (ref 35–45)
PH BLD: 7.36 PH (ref 7.35–7.45)
PO2 BLD: 66 MM HG (ref 80–105)
SPECIMEN SOURCE: NORMAL

## 2020-01-17 PROCEDURE — 92526 ORAL FUNCTION THERAPY: CPT | Mod: GN | Performed by: SPEECH-LANGUAGE PATHOLOGIST

## 2020-01-17 PROCEDURE — 99233 SBSQ HOSP IP/OBS HIGH 50: CPT | Performed by: INTERNAL MEDICINE

## 2020-01-17 PROCEDURE — 12000000 ZZH R&B MED SURG/OB

## 2020-01-17 PROCEDURE — 92507 TX SP LANG VOICE COMM INDIV: CPT | Mod: GN | Performed by: SPEECH-LANGUAGE PATHOLOGIST

## 2020-01-17 PROCEDURE — 36600 WITHDRAWAL OF ARTERIAL BLOOD: CPT

## 2020-01-17 PROCEDURE — 25000132 ZZH RX MED GY IP 250 OP 250 PS 637: Performed by: PSYCHIATRY & NEUROLOGY

## 2020-01-17 PROCEDURE — 25000132 ZZH RX MED GY IP 250 OP 250 PS 637: Performed by: HOSPITALIST

## 2020-01-17 PROCEDURE — 82805 BLOOD GASES W/O2 SATURATION: CPT | Performed by: INTERNAL MEDICINE

## 2020-01-17 PROCEDURE — 99232 SBSQ HOSP IP/OBS MODERATE 35: CPT | Mod: GC | Performed by: PSYCHIATRY & NEUROLOGY

## 2020-01-17 PROCEDURE — 25800030 ZZH RX IP 258 OP 636: Performed by: HOSPITALIST

## 2020-01-17 PROCEDURE — 40000275 ZZH STATISTIC RCP TIME EA 10 MIN

## 2020-01-17 PROCEDURE — 25000132 ZZH RX MED GY IP 250 OP 250 PS 637: Performed by: INTERNAL MEDICINE

## 2020-01-17 RX ORDER — ALLOPURINOL 300 MG/1
300 TABLET ORAL DAILY
Status: DISCONTINUED | OUTPATIENT
Start: 2020-01-17 | End: 2020-01-23 | Stop reason: HOSPADM

## 2020-01-17 RX ORDER — TAMSULOSIN HYDROCHLORIDE 0.4 MG/1
0.4 CAPSULE ORAL EVERY MORNING
Status: DISCONTINUED | OUTPATIENT
Start: 2020-01-17 | End: 2020-01-23 | Stop reason: HOSPADM

## 2020-01-17 RX ORDER — ALBUTEROL SULFATE 0.83 MG/ML
2.5 SOLUTION RESPIRATORY (INHALATION)
Status: DISCONTINUED | OUTPATIENT
Start: 2020-01-17 | End: 2020-01-23 | Stop reason: HOSPADM

## 2020-01-17 RX ORDER — PAROXETINE 20 MG/1
20 TABLET, FILM COATED ORAL DAILY
Status: DISCONTINUED | OUTPATIENT
Start: 2020-01-17 | End: 2020-01-23 | Stop reason: HOSPADM

## 2020-01-17 RX ADMIN — TAMSULOSIN HYDROCHLORIDE 0.4 MG: 0.4 CAPSULE ORAL at 08:55

## 2020-01-17 RX ADMIN — ACETAMINOPHEN 650 MG: 325 TABLET, FILM COATED ORAL at 11:20

## 2020-01-17 RX ADMIN — ALLOPURINOL 300 MG: 300 TABLET ORAL at 08:55

## 2020-01-17 RX ADMIN — ATORVASTATIN CALCIUM 10 MG: 10 TABLET, FILM COATED ORAL at 21:09

## 2020-01-17 RX ADMIN — SODIUM CHLORIDE: 9 INJECTION, SOLUTION INTRAVENOUS at 17:52

## 2020-01-17 RX ADMIN — ACETAMINOPHEN 650 MG: 325 TABLET, FILM COATED ORAL at 21:09

## 2020-01-17 RX ADMIN — ASPIRIN 325 MG: 325 TABLET, DELAYED RELEASE ORAL at 08:55

## 2020-01-17 RX ADMIN — CLOPIDOGREL BISULFATE 75 MG: 75 TABLET ORAL at 08:55

## 2020-01-17 RX ADMIN — PAROXETINE HYDROCHLORIDE 20 MG: 20 TABLET, FILM COATED ORAL at 08:55

## 2020-01-17 ASSESSMENT — ACTIVITIES OF DAILY LIVING (ADL)
ADLS_ACUITY_SCORE: 24
ADLS_ACUITY_SCORE: 27
ADLS_ACUITY_SCORE: 27
ADLS_ACUITY_SCORE: 24
RETIRED_COMMUNICATION: 0-->UNDERSTANDS/COMMUNICATES WITHOUT DIFFICULTY
ADLS_ACUITY_SCORE: 27
SWALLOWING: 0-->SWALLOWS FOODS/LIQUIDS WITHOUT DIFFICULTY
ADLS_ACUITY_SCORE: 27

## 2020-01-17 NOTE — PROGRESS NOTES
Joon's test performed prior to right radial ABG draw. Collateral circulation confirmed.     Carlos Farmer, RT on 1/17/2020 at 9:36 AM

## 2020-01-17 NOTE — PLAN OF CARE
Primary Diagnosis: CVA - acute/subacute infarct in posterior R basal ganglia/ corona radiata region  Orientation: A&Ox4, forgetful  Aggression Stop Light: green  Mobility: A2, lift; T/R q2h  Pain Management: denies  Diet: DD2, honey thick; video swallow today  Bowel/Bladder: incontinent at times  Abnormal Lab/Assessments: HbA1c 6.0  Drain/Device/Wound: PIV SL  Consults: neurology following  D/C Day/Goals/Place: discharge pending    Shift Note:   A/Ox 4. VSS on 2L NC. Tele NSR. Speech garbled. Neuros intact ex L sided facial droop and L sided weakness. Assist of 2 with lift; T/R q2h. DD2 diet, with honey thick liquids. Denies pain. Lung sounds clear. Incontinent B/B, uses urinal w good output. BS active, +  flatus, + BM. Skin WDL ex bruising. PIV SL. Tests/procedures: video swallow today. Therapies recommending TCU. Pt scoring green on the aggression stoplight tool. Plan for discharge tbd. Continue to monitor.

## 2020-01-17 NOTE — CONSULTS
Stroke Education Note    The following information has been reviewed with the patient and family:    1. Warning signs of stroke    2. Calling 911 if having warning signs of stroke    3. All modifiable risk factors: hypertension, CAD, atrial fib, diabetes, hypercholesterolemia, smoking, substance abuse, diet, physical inactivity, obesity, sleep apnea.    4. Patient's risk factors for stroke which include: HTN, pre-diabetic, physical inactivity, unhealthy diet, sleep apnea    5. Follow-up plan for after discharge    6. Discharge medications which include: ASA, Plavix, Lipitor, and metoprolol     In addition, the [facility] Stroke Class Handout has been given to the patient and family.    Learner's response to risk factors / lifestyle modification education: Taking steps     JHONY Calhoun RN

## 2020-01-17 NOTE — PLAN OF CARE
SLP: Video Swallow Study planned at 10:30. Contacted by Radiology that pt is not appropriate at this time. Will plan to follow at bedside today.

## 2020-01-17 NOTE — PROGRESS NOTES
"  [facility]    Stroke Consult Note    Reason for Consult:  Left sided weakness     Chief Complaint: Extremity Weakness and Slurred Speech       Overnight:  Sleepy overnight, ABG shows retaining bicarb    Impression  MRI shows R posterior limb of IC infarct. Embolic vs. Small vessel.  Etiology ESUS. However given mRS is 4, cannot consent for BECCA.  Should be evaluated for loop recorder replacement.         Recommendations  [] Cont Asa 81 and plavix 75 mg for 3 weeks, then  mg qday monotherapy  [] Discuss ARCADRIA tomorrow   [] LDL is 40, A1c of 6  [] Start lipitor 10 mg qday   [] possible loop recorder placement with cardiology, consulted cardiology and discussed with Dr Magdiel Valencia MD  Vascular Neurology Fellow  01/17/2020 2:37 PM    To page stroke neurology, click here: [facility]  Choose \"On Call\" tab at top, then search dropdown box for \"Neurology Adult\" & press Enter, look for Neuro ICU/Stroke      ____________    Past Medical History   Past Medical History:   Diagnosis Date     BPH (benign prostatic hyperplasia)      Cataract      Cholelithiasis      COPD (chronic obstructive pulmonary disease) (H)      Depression      Diabetes mellitus     Type 2     Dyshidrotic foot dermatitis      Edema      Gout      Hyperlipidemia      Hypertension      Kidney stones     Bladder Stones     Lumbago      Lumbar disc displacement without myelopathy      Muscle weakness      Neuropathy, diabetic (H)      Obesity      Spinal stenosis      Stroke (H)     with residual effects- weakness LUE> LLE     Unsteady gait      Urinary retention with incomplete bladder emptying      UTI (urinary tract infection)      Past Surgical History   Past Surgical History:   Procedure Laterality Date     APPENDECTOMY OPEN       ARTHROSCOPY SHOULDER ROTATOR CUFF REPAIR       cataracts Bilateral      CHOLECYSTECTOMY       COLONOSCOPY       CYSTOSCOPY  10/19/2011    Procedure:CYSTOSCOPY; CYSTOSCOPY, BLADDER STONE " REMOVAL; Surgeon:ROB SAWYER; Location: OR     CYSTOSCOPY, TRANSURETHRAL RESECTION (TUR) PROSTATE, COMBINED N/A 2/21/2018    Procedure: COMBINED CYSTOSCOPY, TRANSURETHRAL RESECTION (TUR) PROSTATE;  COMBINED CYSTOSCOPY, TRANSURETHRAL RESECTION (TUR) PROSTATE ;  Surgeon: Rob Sawyer MD;  Location:  OR     EYE SURGERY      right lid surgery      JOINT REPLACEMENT Right     HIP     KNEE SURGERY Bilateral      LAMINECTOMY LUMBAR ONE LEVEL       TONSILLECTOMY       Medications   Home Meds  Prior to Admission medications    Medication Sig Start Date End Date Taking? Authorizing Provider   albuterol (2.5 MG/3ML) 0.083% neb solution Take 1 vial (2.5 mg) by nebulization every 2 hours as needed for shortness of breath / dyspnea or other (dyspnea) 6/17/17  Yes Wali Ibrahim MD   ALLOPURINOL PO Take 300 mg by mouth daily   Yes Unknown, Entered By History   ASPIRIN PO Take 81 mg by mouth daily   Yes Reported, Patient   Emollient (AMLACTIN ULTRA EX) Externally apply topically daily APPLY TO FEET    Yes Reported, Patient   fluocinonide-emollient (LIDEX-E) 0.05 % cream Apply topically 2 times daily as needed   Yes Reported, Patient   metoprolol tartrate (LOPRESSOR) 25 MG tablet Take 12.5 mg by mouth 2 times daily   Yes Unknown, Entered By History   PARoxetine HCl (PAXIL PO) Take 20 mg by mouth daily    Yes Unknown, Entered By History   tamsulosin (FLOMAX) 0.4 MG capsule Take 1 capsule (0.4 mg) by mouth every morning 6/28/19  Yes Maria D Sanchez MD   order for DME Equipment being ordered: Other: Home nebulizer  Treatment Diagnosis: COPD/Pneumonia 6/17/17   Wali Ibrahim MD       Scheduled Meds    allopurinol  300 mg Oral Daily     aspirin  325 mg Oral Daily    Or     aspirin  300 mg Rectal Daily     atorvastatin  10 mg Oral QPM     clopidogrel  75 mg Oral Daily     PARoxetine  20 mg Oral Daily     sodium chloride (PF)  3 mL Intracatheter Q8H     tamsulosin  0.4 mg Oral QAM       Infusion Meds    - MEDICATION  INSTRUCTIONS -       sodium chloride 100 mL/hr at 01/16/20 2033       PRN Meds  acetaminophen, albuterol, bisacodyl, labetalol, lidocaine 4%, lidocaine (buffered or not buffered), magnesium hydroxide, - MEDICATION INSTRUCTIONS -, melatonin, naloxone, ondansetron **OR** ondansetron, prochlorperazine **OR** prochlorperazine **OR** prochlorperazine, senna-docusate **OR** senna-docusate, sodium chloride (PF)    Allergies   No Known Allergies  Family History   Family History   Problem Relation Age of Onset     Prostate Cancer Father      Social History   Social History     Tobacco Use     Smoking status: Former Smoker     Smokeless tobacco: Never Used   Substance Use Topics     Alcohol use: No     Comment: none for 29 yrs     Drug use: No       Review of Systems   The 10 point Review of Systems is negative other than noted in the HPI or here.        PHYSICAL EXAMINATION   Temp:  [95.6  F (35.3  C)-99.1  F (37.3  C)] 99.1  F (37.3  C)  Pulse:  [92] 92  Heart Rate:  [] 105  Resp:  [16] 16  BP: (144-182)/(73-89) 144/73  SpO2:  [90 %-96 %] 90 %    General:  patient lying in bed without any acute distress    HEENT:  normocephalic/atraumatic  Cardio:  RRR  Pulmonary:  no respiratory distress  Abdomen: soft non tender  Extremities: pulses intact  Skin:  no lesions     Neurologic  Mental Status:  alert, oriented x 3, follows commands, speech clear and fluent, naming and repetition normal  Cranial Nerves:  visual fields intact, EOMI with normal smooth pursuit, hearing not formally tested but intact to conversation, no dysarthria, shoulder shrug equal bilaterally, tongue protrusion midline, no facial droop. Sensory equal bilaterally in v/1/2/3 distribution  Motor:  antigravity in bilateral lower extremities.   Dysphagia Screen  Per Nursing     Imaging  I personally reviewed all imaging; relevant findings per HPI.    Labs Data   CBC  Recent Labs   Lab 01/14/20  2356   WBC 8.7   RBC 4.83   HGB 13.4   HCT 44.8         Basic Metabolic Panel   Recent Labs   Lab 01/14/20  2356      POTASSIUM 4.2   CHLORIDE 103   CO2 33*   BUN 24   CR 0.86   *   RAJ 7.9*     Liver Panel  Recent Labs   Lab Test 10/12/18  2032 10/04/17  1400 10/25/15  1005   PROTTOTAL 6.1* 7.2 6.2*   ALBUMIN 2.3* 3.1* 2.9*   BILITOTAL 0.5 0.5 0.8   ALKPHOS 57 83 48   AST 22 11 18   ALT 34 19 31     INR  Recent Labs   Lab Test 01/14/20  2356   INR 1.13      Lipid Profile  Recent Labs   Lab Test 01/16/20  0735   CHOL 87   HDL 30*   LDL 40   TRIG 83     A1C  Recent Labs   Lab Test 01/14/20  2356   A1C 6.0*     Troponin I  Recent Labs   Lab 01/14/20  2356   TROPI <0.015

## 2020-01-17 NOTE — CONSULTS
SW Consult Note:    Care Transition Initial Assessment - SW     Met with: Patient and Family    Active Problems:    Weakness of left upper extremity    Left-sided weakness       DATA  Lives With: spouse      Quality of Family Relationships: helpful, involved, supportive  Description of Support System: Supportive, Involved  Who is your support system?: Wife, Children  Support Assessment: Adequate family and caregiver support, Adequate social supports.   Identified issues/concerns regarding health management: Patient is a 79 year old male that was admitted to the hospital on 1/14/20 with a primary diagnosis of weakness of left upper extremity.  The tentative date for discharge is 1/18 or 1/19.  Reviewed chart and spoke with patient and step daughter López regarding discharge planning.  Patient and step daughter report that patient resides in a condo with his wife.  Patient's spouse provides care for patient but it is getting more difficult for her as she has had several rounds of treatment for cancer and is due to have surgery.  Discussed discharge recommendations of TCU with patient and López.  Patient stated that he has been in TCU before at [facility] for one month.  SW provided SNF list with Medicare.gov ratings and they would like to talk about facility options.      Pt/family was given the Medicare Compare list for SNF, with associated star ratings to assist with choice for referrals/discharge planning Yes    Education was given to pt/family that star ratings are updated/maintained by Medicare and can be reviewed by visiting www.medicare.gov Yes            Quality of Family Relationships: helpful, involved, supportive       ASSESSMENT  Cognitive Status:  awake, alert and oriented  Concerns to be addressed: Discharge planning.     PLAN  Financial costs for the patient includes: TBD  Patient given options and choices for discharge: Provided SNF List.   Patient/family is agreeable to the plan?   Yes  Transportation/person available to transport on day of discharge: TBD  Patient Goals and Preferences: Patient would like to go home, but is understanding that he will need to go to TCU.   Patient anticipates discharging to: TCU    Addendum:  Patient's step Daughter López approached SW to provide TCU choices.  They would like referrals sent to [facility] and [facility].  López requesting SW call to update her and she will call to update her mother.     ARMEN Bell, LGSW  [phone]  [facility]

## 2020-01-17 NOTE — PLAN OF CARE
OT: upon arrival, pt stating he can't move his neck and can barely move his R arm now (admitted w/ L sided weakness but R side weakness is new). RN informed and present w/ patient.

## 2020-01-17 NOTE — PROGRESS NOTES
[facility]    Hospitalist Progress Note    Date of Service (when I saw the patient): 01/17/2020    Assessment & Plan   Mr Gilmore is a 78 y/o male who presented to the ER with L upper extremity weakness    CVA  H/o CVA in 2006 with L facial, arm and leg weakness  With h/o underlying CVA 2006 with minimal residual deficits. Nonambulatory 2/2 back surgery and knee problems. Largely bedbound but able to transfer to power wheelchair, evening of presentation he noted L arm significantly weaker. Per family also garbled speech. Code CVA called in the ED. MRI with acute/ subacute infarct in the posterior R basal ganglia/ corona radiata region.    - neurology following and appreciate assistance  - plavix loaded and continue 75 mg daily x 3 weeks  - ASA 81 mg x 3 weeks then 325 mg daily  - q4 neuro checks  - telemetry  - LDL 40/ HDL 30/ TG 83, troponin negative, A1C at 6.0%  - echocardiogram very difficult study, unable to assess WMA. Bubble negative but poor quality.   - OT/PT/SLP  - atorvastatin 10 mg daily starated  - EP consult for loop recorder placement (per neurology, d/w Dr. Valencia)  - with new c/o R sided weakness in arms/ legs as well as neck stiffness. Very somnolent, difficult to arouse as well. Exam with weakness on R but appears more volitional than true weakness (able to resist fairly strongly in his R upper and lower extremity). Discussed with stroke neuro, they will assess soon.     BRAD  1/17 difficult to arouse but is arousable and able to answer questions. No obvious sedating medications given. Body habitus would be very consistent with BRAD  - ABG 7.36/59/66/34. with elevated bicarb appears to largely represent chronic sleep disordered breathing. 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 Afebrile with no leukocytosis  - await cultures  - hold abx for now    BPH  PTA on tamsulosin 0.4 mg daily, continue    COPD  PTA on prn albuterol nebs, continue    FEN (fluids, electrolytes and nutrition): diet as per slp, IV fluids until taking adequate PO  Discussed with nursing.  DVT Prophylaxis: Pneumatic Compression Devices  Code Status: Full Code    Disposition: Expected discharge in 1-2 days pending ongoing CVA evaluation, ongoing somnolence evaluation    Rg Veloz MD  [phone] (P)  Text Page    Interval History   Overnight events reviewed. Contacted this am re: new R sided upper and lower extremity weakness. Very somnolent and difficult to arouse, needed sternal rub. Was appropriately answering questions once aroused. C/o shoulder stiffness\". Denies cp/sob. No vision changes.     -Data reviewed today: I reviewed all new labs and imaging results over the last 24 hours. I personally reviewed no images or EKG's today.    Physical Exam   Temp: 95.6  F (35.3  C) Temp src: Axillary BP: (!) 176/75 Pulse: 92 Heart Rate: 86 Resp: 16 SpO2: 90 % O2 Device: Nasal cannula Oxygen Delivery: 2 LPM  Vitals:    01/15/20 1810   Weight: 122.7 kg (270 lb 6.4 oz)     Vital Signs with Ranges  Temp:  [95.6  F (35.3  C)-96.7  F (35.9  C)] 95.6  F (35.3  C)  Pulse:  [92] 92  Heart Rate:  [81-86] 86  Resp:  [16] 16  BP: (143-182)/(67-89) 176/75  SpO2:  [90 %-96 %] 90 %  I/O last 3 completed shifts:  In: -   Out: 200 [Urine:200]    Constitutional: Very somnolent but arousable, no acute distress  Respiratory: Lungs clear to auscultation bilaterally anteriorly   Cardiovascular: Regular rate and rhythm, no murmurs  GI: Soft, obese, nontender  Skin/Integumen: No erythema, cyanosis or edema  Other: R side with good strength once able to awake. Appears to have strength in RLE    Medications     - MEDICATION INSTRUCTIONS -       sodium chloride 100 mL/hr at 01/16/20 2033       aspirin "  325 mg Oral Daily    Or     aspirin  300 mg Rectal Daily     atorvastatin  10 mg Oral QPM     clopidogrel  75 mg Oral Daily     sodium chloride (PF)  3 mL Intracatheter Q8H       Data   Recent Labs   Lab 20  2356   WBC 8.7   HGB 13.4   MCV 93      INR 1.13      POTASSIUM 4.2   CHLORIDE 103   CO2 33*   BUN 24   CR 0.86   ANIONGAP 2*   RAJ 7.9*   *   TROPI <0.015       Recent Results (from the past 24 hour(s))   Echocardiogram Complete - Bubble study    Narrative    [id number]  [id number]  [id number]  [id number]^ZEINAB^TIFFANY^FAITH           [facility]  Echocardiography Laboratory  [address] [place] [id number]        Name: SHERI THORPE  MRN: [id number]  : 1940  Study Date: 2020 01:40 PM  Age: 79 yrs  Gender: Male  Patient Location: [facility]  Reason For Study: CVA  Ordering Physician: TIFFANY ARRIOLA  Referring Physician: Augustin Thomas  Performed By: Monserrat Ken     BSA: 2.4 m2  Height: 72 in  Weight: 271 lb  HR: 95  BP: 115/65 mmHg  _____________________________________________________________________________  __        Procedure  Complete Portable Bubble Echo Adult. [employer] (NDC #[id number]) given  intravenously.  _____________________________________________________________________________  __        Interpretation Summary     Technically VERY difficult study despite contrast use. Study is inadequate for  accurate wall motion analysis.  The visual ejection fraction is estimated at 60-65%.  While bubble study is negative, it is very poor quality.  If there is a concern for cardiac source of CVA, alternative imaging such as  VICKEY could be considered if clinically appropriate/ indicated.  _____________________________________________________________________________  __        Left Ventricle  The left ventricle is normal in size. The visual ejection fraction is  estimated at 60-65%. Diastolic Doppler findings (E/E' ratio and/or other  parameters)  suggest left ventricular filling pressures are indeterminate.     Right Ventricle  The right ventricle is mildly dilated. The right ventricular systolic function  is normal.     Atria  Normal left atrial size. Right atrial size is normal.     Mitral Valve  Evaluation of regurgitation is inadequate. There is trace to mild mitral  regurgitation.        Tricuspid Valve  Right ventricular systolic pressure could not be approximated due to  inadequate tricuspid regurgitation. There is trace tricuspid regurgitation.     Aortic Valve  The aortic valve is not well visualized. No hemodynamically significant  valvular aortic stenosis.     Pulmonic Valve  The pulmonic valve is not well visualized.     Vessels  The aortic root is normal size. The aortic root is not well visualized.     Pericardium  There is no pericardial effusion.     _____________________________________________________________________________  __  MMode/2D Measurements & Calculations  IVSd: 1.0 cm  LVIDd: 4.1 cm  LVIDs: 2.5 cm  LVPWd: 1.2 cm  FS: 38.5 %  LV mass(C)d: 154.7 grams  LV mass(C)dI: 63.8 grams/m2     Ao root diam: 3.9 cm  asc Aorta Diam: 3.8 cm  LVOT diam: 2.0 cm  LVOT area: 3.2 cm2  RWT: 0.59        Doppler Measurements & Calculations  MV E max ten: 56.6 cm/sec  MV A max ten: 95.9 cm/sec  MV E/A: 0.59  MV dec time: 0.22 sec  LV V1 max P.6 mmHg  LV V1 max: 80.2 cm/sec  LV V1 VTI: 15.9 cm  SV(LVOT): 50.4 ml  SI(LVOT): 20.8 ml/m2  PA acc time: 0.11 sec  E/E' av.2  Lateral E/e': 8.1  Medial E/e': 10.4              _____________________________________________________________________________  __        Report approved by: Maynor Jasso 2020 03:01 PM

## 2020-01-17 NOTE — PLAN OF CARE
Nursing note  Pt is 79 y.o male with h/o underlying CVA back in 2006 with minimal residual deficits then.Nonambulatory 2/2 back surgery and knee problems,bed bound ,but able to transfer to his power wheelchair. Pt a/o x 4, but forgetful at times. Neuro wise pt has left sided hemiparesis,slurred speech, and left sided facial droop. PT/oT/SLP following. Will be having video swallowing eval tomorrow.  BP slightly elevated 152/73. Tele NSR. On contact isolation for hx of VRE. BLE's edema 2+. Iv infiltrated on the right elbow. Restarted new one on the left hand. Need some help with urinating, uses urinal. Denies any pain. Will continue to monitor.

## 2020-01-17 NOTE — PLAN OF CARE
Discharge Planner SLP   Patient plan for discharge: Pt open to TCU with hopeful return home  Current status: Swallow: Pt finishing lunch as SLP arrived. Pt/family reported difficulty with omelet this morning and intemittent wet/gurling vocal quality during session noted. Family reported component of distractions/talking while eating with new deficits from latest CVA. No overt s/sx of aspiration noted. Anterior spillage and ?pharyngeal residue with DD2 textures. Education provided to pt and family.     They verbalized agreement with recommended: clear liquid, honey thick liquids with strict aspiration precautions and avoid distractions while drinking. Plan to complete VFSS 1/18 with pt requesting after 1000.       Speech: Continued dysarthria in conversation with 95% intelligibility to unfamiliar listener.Cue pt as needed to speech strategies.    Barriers to return to prior living situation: Dysphagia; dysarthria  Recommendations for discharge: TCU  Rationale for recommendations: Continue ST daily for swallow and speech       Entered by: Socorro May 01/17/2020 2:19 PM

## 2020-01-17 NOTE — PLAN OF CARE
"Alert and oriented x4. VSS. Neuros: Left side weakness, left droop and garbled speech, did have and episode this morning of new complaint of right side weakness, this is improved now as pt is more awake and alert, neuro aware \"keep an eye on it today.\" up with lift. Mostly continent but does have urge incontinence. Turn and repo. Tolerating DD2 with honey thick. Swallow study delayed to tomorrow due to pt being very lethargic this morning. Plan to transfer to station 73. Will continue to monitor.   "

## 2020-01-18 ENCOUNTER — APPOINTMENT (OUTPATIENT)
Dept: OCCUPATIONAL THERAPY | Facility: CLINIC | Age: 80
DRG: 040 | End: 2020-01-18
Payer: COMMERCIAL

## 2020-01-18 ENCOUNTER — APPOINTMENT (OUTPATIENT)
Dept: GENERAL RADIOLOGY | Facility: CLINIC | Age: 80
DRG: 040 | End: 2020-01-18
Attending: HOSPITALIST
Payer: COMMERCIAL

## 2020-01-18 ENCOUNTER — APPOINTMENT (OUTPATIENT)
Dept: SPEECH THERAPY | Facility: CLINIC | Age: 80
DRG: 040 | End: 2020-01-18
Payer: COMMERCIAL

## 2020-01-18 PROCEDURE — 25800030 ZZH RX IP 258 OP 636: Performed by: HOSPITALIST

## 2020-01-18 PROCEDURE — 92611 MOTION FLUOROSCOPY/SWALLOW: CPT | Mod: GN

## 2020-01-18 PROCEDURE — 12000000 ZZH R&B MED SURG/OB

## 2020-01-18 PROCEDURE — 74230 X-RAY XM SWLNG FUNCJ C+: CPT

## 2020-01-18 PROCEDURE — 25000132 ZZH RX MED GY IP 250 OP 250 PS 637: Performed by: HOSPITALIST

## 2020-01-18 PROCEDURE — 25000132 ZZH RX MED GY IP 250 OP 250 PS 637: Performed by: PSYCHIATRY & NEUROLOGY

## 2020-01-18 PROCEDURE — 25000132 ZZH RX MED GY IP 250 OP 250 PS 637: Performed by: INTERNAL MEDICINE

## 2020-01-18 PROCEDURE — 97535 SELF CARE MNGMENT TRAINING: CPT | Mod: GO | Performed by: OCCUPATIONAL THERAPIST

## 2020-01-18 PROCEDURE — 99221 1ST HOSP IP/OBS SF/LOW 40: CPT | Performed by: INTERNAL MEDICINE

## 2020-01-18 PROCEDURE — 92526 ORAL FUNCTION THERAPY: CPT | Mod: GN

## 2020-01-18 PROCEDURE — 99232 SBSQ HOSP IP/OBS MODERATE 35: CPT | Performed by: STUDENT IN AN ORGANIZED HEALTH CARE EDUCATION/TRAINING PROGRAM

## 2020-01-18 RX ORDER — BARIUM SULFATE 400 MG/ML
SUSPENSION ORAL ONCE
Status: COMPLETED | OUTPATIENT
Start: 2020-01-18 | End: 2020-01-18

## 2020-01-18 RX ORDER — ASPIRIN 81 MG/1
81 TABLET ORAL DAILY
Status: DISCONTINUED | OUTPATIENT
Start: 2020-01-19 | End: 2020-01-23 | Stop reason: HOSPADM

## 2020-01-18 RX ADMIN — TAMSULOSIN HYDROCHLORIDE 0.4 MG: 0.4 CAPSULE ORAL at 08:55

## 2020-01-18 RX ADMIN — BARIUM SULFATE 30 ML: 400 SUSPENSION ORAL at 09:41

## 2020-01-18 RX ADMIN — ATORVASTATIN CALCIUM 10 MG: 10 TABLET, FILM COATED ORAL at 20:36

## 2020-01-18 RX ADMIN — CLOPIDOGREL BISULFATE 75 MG: 75 TABLET ORAL at 08:55

## 2020-01-18 RX ADMIN — PAROXETINE HYDROCHLORIDE 20 MG: 20 TABLET, FILM COATED ORAL at 08:55

## 2020-01-18 RX ADMIN — SODIUM CHLORIDE: 9 INJECTION, SOLUTION INTRAVENOUS at 03:58

## 2020-01-18 RX ADMIN — ALLOPURINOL 300 MG: 300 TABLET ORAL at 08:56

## 2020-01-18 RX ADMIN — ASPIRIN 325 MG: 325 TABLET, DELAYED RELEASE ORAL at 08:55

## 2020-01-18 RX ADMIN — SODIUM CHLORIDE: 9 INJECTION, SOLUTION INTRAVENOUS at 15:49

## 2020-01-18 ASSESSMENT — ACTIVITIES OF DAILY LIVING (ADL)
ADLS_ACUITY_SCORE: 30
ADLS_ACUITY_SCORE: 24

## 2020-01-18 NOTE — PLAN OF CARE
Alert and oriented x4. VSS.Denies pain. Neuros left side weakness/hemiparesis, L droop, garbled speech. Up with lift. Advanced to DD1 with nectar liquids. Tele SR with BBB. Plan to continue to monitor likely discharge to TCU.

## 2020-01-18 NOTE — PROGRESS NOTES
"  [facility]    Stroke Consult Note    Reason for Consult:  Left sided weakness     Chief Complaint: Extremity Weakness and Slurred Speech       Overnight:  Unchanged neuro exam     Impression  MRI shows R posterior limb of IC infarct. Embolic vs. Small vessel.  Etiology ESUS. However given mRS is 4, cannot consent for BECCA.  Should be evaluated for loop recorder replacement.         Recommendations  [] Cont Asa 81 and plavix 75 mg for 3 weeks until 2/4/2020, then  mg qday monotherapy  [] LDL is 40, A1c of 6  [] Cont lipitor 10 mg qday   [] possible loop recorder placement with cardiology, consulted cardiology and discussed with Dr Veloz   [] Patient should have N neurology follow up 4-6 weeks post discharge    Stroke team will sign off       Keyla Valencia MD  Vascular Neurology Fellow  01/18/2020 9:15 AM    To page stroke neurology, click here: [facility]  Choose \"On Call\" tab at top, then search dropdown box for \"Neurology Adult\" & press Enter, look for Neuro ICU/Stroke      ____________    Past Medical History   Past Medical History:   Diagnosis Date     BPH (benign prostatic hyperplasia)      Cataract      Cholelithiasis      COPD (chronic obstructive pulmonary disease) (H)      Depression      Diabetes mellitus     Type 2     Dyshidrotic foot dermatitis      Edema      Gout      Hyperlipidemia      Hypertension      Kidney stones     Bladder Stones     Lumbago      Lumbar disc displacement without myelopathy      Muscle weakness      Neuropathy, diabetic (H)      Obesity      Spinal stenosis      Stroke (H)     with residual effects- weakness LUE> LLE     Unsteady gait      Urinary retention with incomplete bladder emptying      UTI (urinary tract infection)      Past Surgical History   Past Surgical History:   Procedure Laterality Date     APPENDECTOMY OPEN       ARTHROSCOPY SHOULDER ROTATOR CUFF REPAIR       cataracts Bilateral      CHOLECYSTECTOMY       COLONOSCOPY       CYSTOSCOPY  " 10/19/2011    Procedure:CYSTOSCOPY; CYSTOSCOPY, BLADDER STONE REMOVAL; Surgeon:ROB SAWYER; Location: OR     CYSTOSCOPY, TRANSURETHRAL RESECTION (TUR) PROSTATE, COMBINED N/A 2/21/2018    Procedure: COMBINED CYSTOSCOPY, TRANSURETHRAL RESECTION (TUR) PROSTATE;  COMBINED CYSTOSCOPY, TRANSURETHRAL RESECTION (TUR) PROSTATE ;  Surgeon: Rob Sawyer MD;  Location:  OR     EYE SURGERY      right lid surgery      JOINT REPLACEMENT Right     HIP     KNEE SURGERY Bilateral      LAMINECTOMY LUMBAR ONE LEVEL       TONSILLECTOMY       Medications   Home Meds  Prior to Admission medications    Medication Sig Start Date End Date Taking? Authorizing Provider   albuterol (2.5 MG/3ML) 0.083% neb solution Take 1 vial (2.5 mg) by nebulization every 2 hours as needed for shortness of breath / dyspnea or other (dyspnea) 6/17/17  Yes Wali Ibrahim MD   ALLOPURINOL PO Take 300 mg by mouth daily   Yes Unknown, Entered By History   ASPIRIN PO Take 81 mg by mouth daily   Yes Reported, Patient   Emollient (AMLACTIN ULTRA EX) Externally apply topically daily APPLY TO FEET    Yes Reported, Patient   fluocinonide-emollient (LIDEX-E) 0.05 % cream Apply topically 2 times daily as needed   Yes Reported, Patient   metoprolol tartrate (LOPRESSOR) 25 MG tablet Take 12.5 mg by mouth 2 times daily   Yes Unknown, Entered By History   PARoxetine HCl (PAXIL PO) Take 20 mg by mouth daily    Yes Unknown, Entered By History   tamsulosin (FLOMAX) 0.4 MG capsule Take 1 capsule (0.4 mg) by mouth every morning 6/28/19  Yes Maria D Sanchez MD   order for DME Equipment being ordered: Other: Home nebulizer  Treatment Diagnosis: COPD/Pneumonia 6/17/17   Wali Ibrahim MD       Scheduled Meds    allopurinol  300 mg Oral Daily     aspirin  325 mg Oral Daily    Or     aspirin  300 mg Rectal Daily     atorvastatin  10 mg Oral QPM     clopidogrel  75 mg Oral Daily     PARoxetine  20 mg Oral Daily     sodium chloride (PF)  3 mL Intracatheter Q8H      tamsulosin  0.4 mg Oral QAM       Infusion Meds    - MEDICATION INSTRUCTIONS -       sodium chloride 100 mL/hr at 01/18/20 0358       PRN Meds  acetaminophen, albuterol, bisacodyl, labetalol, lidocaine 4%, lidocaine (buffered or not buffered), magnesium hydroxide, - MEDICATION INSTRUCTIONS -, melatonin, naloxone, ondansetron **OR** ondansetron, prochlorperazine **OR** prochlorperazine **OR** prochlorperazine, senna-docusate **OR** senna-docusate, sodium chloride (PF)    Allergies   No Known Allergies  Family History   Family History   Problem Relation Age of Onset     Prostate Cancer Father      Social History   Social History     Tobacco Use     Smoking status: Former Smoker     Smokeless tobacco: Never Used   Substance Use Topics     Alcohol use: No     Comment: none for 29 yrs     Drug use: No       Review of Systems   The 10 point Review of Systems is negative other than noted in the HPI or here.        PHYSICAL EXAMINATION   Temp:  [97.3  F (36.3  C)-100.8  F (38.2  C)] 98.5  F (36.9  C)  Heart Rate:  [] 95  Resp:  [16-18] 18  BP: (115-154)/(63-76) 127/69  SpO2:  [88 %-95 %] 95 %    General:  patient lying in bed without any acute distress    HEENT:  normocephalic/atraumatic  Cardio:  RRR  Pulmonary:  no respiratory distress  Abdomen: soft non tender  Extremities: pulses intact  Skin:  no lesions     Neurologic  Mental Status:  alert, oriented x 3, follows commands, speech clear and fluent, naming and repetition normal  Cranial Nerves:  visual fields intact, EOMI with normal smooth pursuit, hearing not formally tested but intact to conversation, no dysarthria, shoulder shrug equal bilaterally, tongue protrusion midline, no facial droop. Sensory equal bilaterally in v/1/2/3 distribution  Motor:  antigravity in bilateral lower extremities.   Dysphagia Screen  Per Nursing     Imaging  I personally reviewed all imaging; relevant findings per HPI.    Labs Data   CBC  Recent Labs   Lab 01/14/20  2356   WBC 8.7    RBC 4.83   HGB 13.4   HCT 44.8        Basic Metabolic Panel   Recent Labs   Lab 01/14/20  2356      POTASSIUM 4.2   CHLORIDE 103   CO2 33*   BUN 24   CR 0.86   *   RAJ 7.9*     Liver Panel  Recent Labs   Lab Test 10/12/18  2032 10/04/17  1400 10/25/15  1005   PROTTOTAL 6.1* 7.2 6.2*   ALBUMIN 2.3* 3.1* 2.9*   BILITOTAL 0.5 0.5 0.8   ALKPHOS 57 83 48   AST 22 11 18   ALT 34 19 31     INR  Recent Labs   Lab Test 01/14/20  2356   INR 1.13      Lipid Profile  Recent Labs   Lab Test 01/16/20  0735   CHOL 87   HDL 30*   LDL 40   TRIG 83     A1C  Recent Labs   Lab Test 01/14/20  2356   A1C 6.0*     Troponin I  Recent Labs   Lab 01/14/20  2356   TROPI <0.015

## 2020-01-18 NOTE — CONSULTS
[facility]    Cardiac Electrophysiology Consultation     Date of Admission:  1/14/2020  Date of Consult (When I saw the patient): 01/18/20    Assessment & Plan   Ron Gilmore is a 79 year old male who was admitted on 1/14/2020. I was asked to see the patient for for consideration of an implantable loop recorder (ILR). Pt with h/o remote CVA with residual left UE weakness and mostly non-ambulatory presented with worsening LUE weakness and slurred speech. Code Stroke activated in ED. MRI shows acute/subacute infarct in the posterior R basal ganglia/corona radiata region. No lytics given. ECG shows sinus rhythm with rbbb. Tele no afib. Echo normal LVEF. Neuro deficits improving. Concern for possible embolic cause. Agree with cardiac event monitor but pt has to be here until Monday to have it done and moreover certain INS would not cover ILR until 30 days event monitor is done. Best is to have 30 days event and ILR after if uneventful. No medical reason to wait for Monday to have ILR in order to be discharged. Pt can be discharged to home from my perspective.    Evgeny Rose    Code Status    Full Code    Primary Care Physician   Augustin Thomas    History is obtained from the patient    Past Medical History   I have reviewed this patient's medical history and updated it with pertinent information if needed.   Past Medical History:   Diagnosis Date     BPH (benign prostatic hyperplasia)      Cataract      Cholelithiasis      COPD (chronic obstructive pulmonary disease) (H)      Depression      Diabetes mellitus     Type 2     Dyshidrotic foot dermatitis      Edema      Gout      Hyperlipidemia      Hypertension      Kidney stones     Bladder Stones     Lumbago      Lumbar disc displacement without myelopathy      Muscle weakness      Neuropathy, diabetic (H)      Obesity      Spinal stenosis      Stroke (H)     with residual effects- weakness LUE> LLE     Unsteady gait      Urinary retention with  incomplete bladder emptying      UTI (urinary tract infection)        Past Surgical History   I have reviewed this patient's surgical history and updated it with pertinent information if needed.  Past Surgical History:   Procedure Laterality Date     APPENDECTOMY OPEN       ARTHROSCOPY SHOULDER ROTATOR CUFF REPAIR       cataracts Bilateral      CHOLECYSTECTOMY       COLONOSCOPY       CYSTOSCOPY  10/19/2011    Procedure:CYSTOSCOPY; CYSTOSCOPY, BLADDER STONE REMOVAL; Surgeon:ROB SAWYER; Location: OR     CYSTOSCOPY, TRANSURETHRAL RESECTION (TUR) PROSTATE, COMBINED N/A 2/21/2018    Procedure: COMBINED CYSTOSCOPY, TRANSURETHRAL RESECTION (TUR) PROSTATE;  COMBINED CYSTOSCOPY, TRANSURETHRAL RESECTION (TUR) PROSTATE ;  Surgeon: Rob Sawyer MD;  Location:  OR     EYE SURGERY      right lid surgery      JOINT REPLACEMENT Right     HIP     KNEE SURGERY Bilateral      LAMINECTOMY LUMBAR ONE LEVEL       TONSILLECTOMY         Prior to Admission Medications   Prior to Admission Medications   Prescriptions Last Dose Informant Patient Reported? Taking?   ALLOPURINOL PO 1/14/2020 at am Pharmacy Yes Yes   Sig: Take 300 mg by mouth daily   ASPIRIN PO 1/14/2020 at am Self Yes Yes   Sig: Take 81 mg by mouth daily   Emollient (AMLACTIN ULTRA EX) 1/14/2020 at am Self Yes Yes   Sig: Externally apply topically daily APPLY TO FEET    PARoxetine HCl (PAXIL PO) 1/14/2020 at am Pharmacy Yes Yes   Sig: Take 20 mg by mouth daily    albuterol (2.5 MG/3ML) 0.083% neb solution prn med Self No Yes   Sig: Take 1 vial (2.5 mg) by nebulization every 2 hours as needed for shortness of breath / dyspnea or other (dyspnea)   fluocinonide-emollient (LIDEX-E) 0.05 % cream prn med Self Yes Yes   Sig: Apply topically 2 times daily as needed   metoprolol tartrate (LOPRESSOR) 25 MG tablet 1/14/2020 at pm Pharmacy Yes Yes   Sig: Take 12.5 mg by mouth 2 times daily   order for DME  Self No No   Sig: Equipment being ordered: Other: Home  nebulizer  Treatment Diagnosis: COPD/Pneumonia   tamsulosin (FLOMAX) 0.4 MG capsule 1/14/2020 at am Pharmacy No Yes   Sig: Take 1 capsule (0.4 mg) by mouth every morning      Facility-Administered Medications: None     Allergies   No Known Allergies    Social History   I have reviewed this patient's social history and updated it with pertinent information if needed. Ron Gilmore  reports that he has quit smoking. He has never used smokeless tobacco. He reports that he does not drink alcohol or use drugs.    Family History   I have reviewed this patient's family history and updated it with pertinent information if needed.   Family History   Problem Relation Age of Onset     Prostate Cancer Father        Review of Systems   Comprehensive review of systems was performed with pertinent positives and negatives listed in assessment and plan section.    Physical Exam   Temp: 97.5  F (36.4  C) Temp src: Oral BP: 91/53   Heart Rate: 106 Resp: 18 SpO2: 94 % O2 Device: Nasal cannula Oxygen Delivery: 3 LPM  Vital Signs with Ranges  Temp:  [97.3  F (36.3  C)-100.8  F (38.2  C)] 97.5  F (36.4  C)  Heart Rate:  [] 106  Resp:  [16-18] 18  BP: ()/(53-76) 91/53  SpO2:  [88 %-95 %] 94 %  270 lbs 6.4 oz    Constitutional: awake, alert, cooperative, no apparent distress, and appears stated age  Eyes: Lids and lashes normal, pupils equal, round, extra ocular muscles intact, sclera clear, conjunctiva normal  ENT: Normocephalic, without obvious abnormality, atraumatic, sinuses nontender on palpation, external ears without lesions, oral pharynx with moist mucous membranes, tonsils without erythema or exudates, gums normal and good dentition.  Hematologic / Lymphatic: no cervical lymphadenopathy  Respiratory: No increased work of breathing, good air exchange, clear to auscultation bilaterally, no crackles or wheezing  Cardiovascular: Normal apical impulse, regular rate and rhythm, normal S1 and S2, no S3 or S4, and no murmur  noted  GI: No scars, normal bowel sounds, soft, non-distended, non-tender, no masses palpated, no hepatosplenomegally  Skin: no bruising or bleeding  Musculoskeletal: There is no redness, warmth, or swelling of the joints.    Neurologic: Awake, alert,   Neuropsychiatric: General: normal, calm and normal eye contact    Data   I personally reviewed all recent ECGs and images.  Results for orders placed or performed during the hospital encounter of 01/14/20 (from the past 24 hour(s))   XR Video Swallow w/o Esophagram w/SLP or OT    Narrative    VIDEO SWALLOWING EVALUATION   1/18/2020 9:44 AM     HISTORY: Assess pharyngeal phase.    COMPARISON: None.    FLUOROSCOPY TIME: 1.4 minutes     Number of cine runs: 12    FINDINGS:    Thin: Premature spill to the piriforms. Minimal flash penetration on  most swallows. Sawyer aspiration with spontaneous cough by straw only.  Otherwise normal.    Nectar: Premature spill to the piriforms. Mild flash penetration with  most swallows. Otherwise normal.    Honey: Premature spill to the vallecula. Otherwise normal.    Pudding: Premature spill to the vallecula. Otherwise normal.    Semisolid: Normal.    Solid: Normal.    This study only includes the cervical esophagus.    RUBY CARBONE MD

## 2020-01-18 NOTE — PROGRESS NOTES
01/18/20 1030   General Information   Swallowing Evaluation Videofluoroscopic evaluation   VFSS Evaluation   VFSS Additional Documentation Yes   VFSS Eval: Radiology   Radiologist Dr. Sanchez   Views Taken left lateral   Physical Location of Procedure [facility]   VFSS Eval: Thin Liquid Texture Trial   Mode of Presentation, Thin Liquid spoon;cup;straw   Order of Presentation 6, 7, 8, 12   Preparatory Phase Poor bolus control   Oral Phase, Thin Liquid Poor AP movement;Residue in oral cavity;Premature pharyngeal entry   Pharyngeal Phase, Thin Liquid Delayed swallow reflex   Rosenbek's Penetration Aspiration Scale: Thin Liquid Trial Results 7 - contrast passes glottis, visible subglottic residue remains despite patient's response (aspiration)   Response to Aspiration unproductive reflexive involuntary cough/throat clear   Diagnostic Statement Given premature spillage to pyriform sinuses, jean aspiration x1 with strong cough response with thin via straw, flash penetration with thin via tsp/cup   VFSS Eval: Nectar Thick Liquid Texture Trial   Mode of Presentation, Nectar spoon;cup   Order of Presentation 3, 4, 5   Preparatory Phase Poor bolus control   Oral Phase, [place] Poor AP movement;Residue in oral cavity;Premature pharyngeal entry   Pharyngeal Phase, Nectar Delayed swallow reflex   Rosenbek's Penetration Aspiration Scale: Nectar-Thick Liquid Trial Results 2 - contrast enters airway, remains above the vocal cords, no residue remains (penetration)   Diagnostic Statement Flash penetration; smaller sips with decreased spillage/decrease chance of penetration   VFSS Eval: Honey Thick Texture Trial   Mode of Presentation, Honey spoon;cup   Order of Presentation 1, 2   Preparatory Phase Poor bolus control   Oral Phase, Honey Poor AP movement;Residue in oral cavity;Premature pharyngeal entry   Pharyngeal Phase, Honey Delayed swallow reflex   Rosenbek's Penetration Aspiration Scale: Honey Trial Results 1 - no aspiration, contrast  does not enter airway   VFSS Eval: Puree Solid Texture Trial   Mode of Presentation, Puree spoon   Order of Presentation 9   Preparatory Phase Poor bolus control   Oral Phase, Puree Poor AP movement;Residue in oral cavity;Premature pharyngeal entry   Pharyngeal Phase, Puree Delayed swallow reflex   Rosenbek's Penetration Aspiration Scale: Puree Food Trial Results 1 - no aspiration, contrast does not enter airway   Diagnostic Statement No pharyngeal residuals   VFSS Eval: Semisolid Texture Trial   Mode of Presentation, Semisolid spoon   Order of Presentation 10   Preparatory Phase Poor bolus control   Oral Phase, Semisolid Poor AP movement;Residue in oral cavity;Premature pharyngeal entry   Pharyngeal Phase, Semisolid Delayed swallow reflex   Rosenbek's Penetration Aspiration Scale: Semisolid Food Trial Results 1 - no aspiration, contrast does not enter airway   Diagnostic Statement No pharyngeal residuals   VFSS Eval: Solid Food Texture Trial   Mode of Presentation, Solid self-fed   Order of Presentation 11   Preparatory Phase Poor bolus control  (prolonged mastication)   Oral Phase, Solid Poor AP movement;Residue in oral cavity;Premature pharyngeal entry   Pharyngeal Phase, Solid Delayed swallow reflex   Rosenbek's Penetration Aspiration Scale: Solid Food Trial Results 1 - no aspiration, contrast does not enter airway   Diagnostic Statement No pharyngeal residuals   Esophageal Phase of Swallow   Esophageal comments good clearance of bolus through upper esophagus   General Therapy Interventions   Planned Therapy Interventions Dysphagia Treatment   Dysphagia treatment Oropharyngeal exercise training;Modified diet education;Instruction of safe swallow strategies   Swallow Eval: Clinical Impressions   Skilled Criteria for Therapy Intervention Skilled criteria met.  Treatment indicated.   Functional Assessment Scale (FAS) 4   Dysphagia Outcome Severity Scale (CHARY) Level 4 - CHARY   Treatment Diagnosis mild-moderate  oropharyngeal dysphagia   Diet texture recommendations Dysphagia diet level 1;Nectar thick liquids   Recommended Feeding/Eating Techniques alternate between small bites and sips of food/liquid;check mouth frequently for oral residue/pocketing;hard swallow w/ each bite or sip;maintain upright posture during/after eating for 30 mins;no straws;small sips/bites   Demonstrates Need for Referral to Another Service dietitian   Therapy Frequency Daily   Predicted Duration of Therapy Intervention (days/wks) 1 week   Anticipated Discharge Disposition   (TCU)   Risks and Benefits of Treatment have been explained. Yes   Patient, family and/or staff in agreement with Plan of Care Yes   Clinical Impression Comments Video fluoroscopic swallow study completed in conjunction with radiology. Pt assessed with thin liquids, nectar thick liquids, honey thick liquids, puree solids, semi-solids and regular solids. He currently presents with mild-moderate oropharyngeal dysphagia. Oral phase notable for reduced lingual strength, tongue pumping, prolonged mastication, mild buccal pocketing across all, mild reduced base of tongue retraction.mild base of tongue residuals and reduced oral control resulting in premature spillage of bolus into pharynx (to valleculae with solids, just over tip of epiglottis with honey, pyriform sinuses with nectar/thin). Hyolaryngeal elevation mod-severely reduced, excursion mildly reduced, epiglottic inversion complete. Given premature spillage, jean aspiration x1 with strong cough response with thin via straw, flash penetration with thin via tsp/cup and nectar via tsp/cup. No pen/asp with honey, puree, solids.    Total Evaluation Time   Total Evaluation Time (Minutes) 41

## 2020-01-18 NOTE — PLAN OF CARE
Pt here with right posterior limb of IC infarct. A&O x 4. Forgetful. Slurred/garbled speech.  LUE and LLE weakness with strength of 1/5 both. Temp of 100.1 decreased to 98. 5 with tylenol. HR tachy. Tele ST. Clear liquid diet, honey thickened liquids. Takes pills crush in applesauce. Up with lift. Turned and repositioned q 2hrs. Using urinal to void but incontinent sometimes. Denies pain. mepilex placed on coccyx for protection due to purple bruise on coccyx. Pt scoring green on the Aggression Stop Light Tool. Plan video swallow sturdy tomorrow. Discharge to TCU pending.

## 2020-01-18 NOTE — PLAN OF CARE
Discharge Planner OT   Patient plan for discharge: TCU  Current status: pt agreeable to activity.  Transferred to EOB with ModA to move LLE, and Srinivasa of 2 supine > sit, with HOB at 45.  Pt sat EOB x15 minutes, supporting self with RUE, and needing multiple VC to sit upright, otherwise tends to lean to L side.  Pt participated in TRACYE MAT, with active shoulder shrug today (2 days ago was just a muscle twitch).  ModA of 2 sit > supine, and to reposition in bed, pt actively assisting with RUE and RLE.  Daughter present for session, confirming that pt's wife is not able to assist at patient's current level of need; dtr hoping to move her parents into longterm setting eventually.  Barriers to return to prior living situation: current level of assist, weakness, limited support at home  Recommendations for discharge: TCU  Rationale for recommendations: pt below baseline, and would benefit from continued therapy to increase safety and independence with ADLs/transfers.       Entered by: ANNETTE BECKWITH 01/18/2020 3:41 PM

## 2020-01-18 NOTE — PROGRESS NOTES
Jonathan Progress Note  Chart Reviewed, Pt discussed in Interdisciplinary Rounds.   Pt scheduled for video swallow today and and EP consult.  Pt will not be ready for discharge today.    Intervention:   JONATHAN updated Aurelia at [facility] that pt is not clinically ready to discharge today.  JONATHAN will call [facility] admissions in am after rounding to determine if pt will discharge Sunday 1/19/2020.    Anticipated discharge placement: [facility] TCU  TIFFANY Rodriguez  [facility]  [phone]    :

## 2020-01-18 NOTE — PLAN OF CARE
Discharge Planner SLP   Patient plan for discharge: TCU  Current status: Video fluoroscopic swallow study completed in conjunction with radiology. Pt assessed with thin liquids, nectar thick liquids, honey thick liquids, puree solids, semi-solids and regular solids. He currently presents with mild-moderate oropharyngeal dysphagia. Oral phase notable for reduced lingual strength, tongue pumping, prolonged mastication, mild buccal pocketing across all, mild reduced base of tongue retraction.mild base of tongue residuals and reduced oral control resulting in premature spillage of bolus into pharynx (to valleculae with solids, just over tip of epiglottis with honey, pyriform sinuses with nectar/thin). Hyolaryngeal elevation mod-severely reduced, excursion mildly reduced, epiglottic inversion complete. Given premature spillage, jean aspiration x1 with strong cough response with thin via straw, flash penetration with thin via tsp/cup and nectar via tsp/cup. No pen/asp with honey, puree, solids.     Swallow tx completed in room following evaluation. Education re: results including video review. Trials of 4 oz nectar thick liquids, 4 oz puree solids and bite of semi-solids. Mastication prolonged/? Insufficient with semi-solids resulting in overt cough. No other signs/sx with puree/nectar with small single sips. Pt required cues to not talk with bolus in oral cavity.     Recommendations: upgrade to dysphagia diet level 1, continue nectar thick liquids (tsp or small single cup sip) when fully alert and upright. 1:1 assist and cueing for strategy. Encourage pt to feed self as able.     Barriers to return to prior living situation: dysphagia/aspiration risk, below baseline re: speech  Recommendations for discharge: TCU  Rationale for recommendations: ongoing SLP for both dysphagia/dysarthria as pt below baseline 2/2 acute CVA       Entered by: Carlyn Aguilar 01/18/2020 10:24 AM

## 2020-01-18 NOTE — PLAN OF CARE
SLP: Attempted dysarthria session x2 this afternoon however pt busy with other caregivers at time of both attempts. Will continue per POC.

## 2020-01-18 NOTE — PROGRESS NOTES
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 Nonambulatory 2/2 back surgery and knee problems. Largely bedbound but able to transfer to power wheelchair, evening of presentation he noted L arm significantly weaker. Per family also garbled speech. Code CVA called in the ED. MRI with acute/ subacute infarct in the posterior R basal ganglia/ corona radiata region.  Echocardiogram very difficult study, unable to assess WMA. Bubble negative but poor quality. LDL 40/ HDL 30/ TG 83, troponin negative, A1C at 6.0%  Plan:  - neurology following and appreciate assistance  - plavix loaded and continue 75 mg daily x 3 weeks  - ASA 81 mg x 3 weeks then 325 mg daily  - q4 neuro checks  - telemetry  - OT/PT/SLP  - atorvastatin 10 mg daily   - EP consult for loop recorder placement (per neurology)    BRAD  1/17 difficult to arouse but is arousable and able to answer questions. No obvious sedating medications given. Body habitus would be very consistent with BRAD.  ABG 7.36/59/66/34. with elevated bicarb appears to largely represent chronic sleep disordered breathing. Wife reports that he is supposed to use CPAP at night at home but refuses. Somnolence much improved overnight.  Plan:  - monitor for now     HTN  PTA on metoprolol 21.5 mg BID, continued when okay with neurology. Currently held for permissive htn.     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 Afebrile with no leukocytosis. UC with candida tropicalis. Overall asymptomatic from urinary standpoint.   Plan:  - hold abx for now    BPH  PTA on tamsulosin 0.4 mg daily,  continue    COPD  PTA on prn albuterol nebs, continue    FEN (fluids, electrolytes and nutrition): diet as per slp, IV fluids until taking adequate PO  Discussed with nursing.  DVT Prophylaxis: Pneumatic Compression Devices  Code Status: Full Code    Disposition: Expected discharge in 1-2 days pending placement.     Sandro Maradiaga MD  Text Page    Interval History     No issues with somnolence today, patient was appropriately answering questions once aroused. Denies cp/sob. No vision changes. No new weakness or numbness today. No nausea/vomiting. Video swallow study planned for today.     -Data reviewed today: I reviewed all new labs and imaging results over the last 24 hours. I personally reviewed no images or EKG's today.    Physical Exam   Temp: 98.5  F (36.9  C) Temp src: Oral BP: 127/69   Heart Rate: 95 Resp: 18 SpO2: 95 % O2 Device: Nasal cannula Oxygen Delivery: 3 LPM  Vitals:    01/15/20 1810   Weight: 122.7 kg (270 lb 6.4 oz)     Vital Signs with Ranges  Temp:  [97.3  F (36.3  C)-100.8  F (38.2  C)] 98.5  F (36.9  C)  Heart Rate:  [] 95  Resp:  [16-18] 18  BP: (115-154)/(63-76) 127/69  SpO2:  [88 %-95 %] 95 %  I/O last 3 completed shifts:  In: 240 [P.O.:240]  Out: 300 [Urine:300]    Constitutional: Alert and awake with no acute distress  Respiratory: Lungs clear to auscultation bilaterally anteriorly   Cardiovascular: Regular rate and rhythm, no murmurs  GI: Soft, obese, nontender  Skin/Integumen: No erythema, cyanosis or edema  Other: R side with good strength. Appears to have normal strength in RLE. Left hemiparesis present.     Medications     - MEDICATION INSTRUCTIONS -       sodium chloride 100 mL/hr at 01/18/20 0358       allopurinol  300 mg Oral Daily     [START ON 1/19/2020] aspirin  81 mg Oral Daily     atorvastatin  10 mg Oral QPM     clopidogrel  75 mg Oral Daily     PARoxetine  20 mg Oral Daily     sodium chloride (PF)  3 mL Intracatheter Q8H     tamsulosin  0.4 mg Oral QAM       Data    Recent Labs   Lab 01/14/20  2356   WBC 8.7   HGB 13.4   MCV 93      INR 1.13      POTASSIUM 4.2   CHLORIDE 103   CO2 33*   BUN 24   CR 0.86   ANIONGAP 2*   RAJ 7.9*   *   TROPI <0.015       Recent Results (from the past 24 hour(s))   XR Video Swallow w/o Esophagram w/SLP or OT    Narrative    VIDEO SWALLOWING EVALUATION   1/18/2020 9:44 AM     HISTORY: Assess pharyngeal phase.    COMPARISON: None.    FLUOROSCOPY TIME: 1.4 minutes     Number of cine runs: 12    FINDINGS:    Thin: Premature spill to the piriforms. Minimal flash penetration on  most swallows. Sawyer aspiration with spontaneous cough by straw only.  Otherwise normal.    Nectar: Premature spill to the piriforms. Mild flash penetration with  most swallows. Otherwise normal.    Honey: Premature spill to the vallecula. Otherwise normal.    Pudding: Premature spill to the vallecula. Otherwise normal.    Semisolid: Normal.    Solid: Normal.    This study only includes the cervical esophagus.

## 2020-01-19 ENCOUNTER — APPOINTMENT (OUTPATIENT)
Dept: SPEECH THERAPY | Facility: CLINIC | Age: 80
DRG: 040 | End: 2020-01-19
Payer: COMMERCIAL

## 2020-01-19 ENCOUNTER — APPOINTMENT (OUTPATIENT)
Dept: OCCUPATIONAL THERAPY | Facility: CLINIC | Age: 80
DRG: 040 | End: 2020-01-19
Payer: COMMERCIAL

## 2020-01-19 PROCEDURE — 25000132 ZZH RX MED GY IP 250 OP 250 PS 637: Performed by: STUDENT IN AN ORGANIZED HEALTH CARE EDUCATION/TRAINING PROGRAM

## 2020-01-19 PROCEDURE — 92507 TX SP LANG VOICE COMM INDIV: CPT | Mod: GN

## 2020-01-19 PROCEDURE — 92526 ORAL FUNCTION THERAPY: CPT | Mod: GN

## 2020-01-19 PROCEDURE — 25000128 H RX IP 250 OP 636: Performed by: INTERNAL MEDICINE

## 2020-01-19 PROCEDURE — 25000132 ZZH RX MED GY IP 250 OP 250 PS 637: Performed by: INTERNAL MEDICINE

## 2020-01-19 PROCEDURE — 25000132 ZZH RX MED GY IP 250 OP 250 PS 637: Performed by: PSYCHIATRY & NEUROLOGY

## 2020-01-19 PROCEDURE — 99232 SBSQ HOSP IP/OBS MODERATE 35: CPT | Performed by: INTERNAL MEDICINE

## 2020-01-19 PROCEDURE — 12000000 ZZH R&B MED SURG/OB

## 2020-01-19 PROCEDURE — 25800030 ZZH RX IP 258 OP 636: Performed by: HOSPITALIST

## 2020-01-19 PROCEDURE — 25000132 ZZH RX MED GY IP 250 OP 250 PS 637: Performed by: HOSPITALIST

## 2020-01-19 PROCEDURE — 97110 THERAPEUTIC EXERCISES: CPT | Mod: GO

## 2020-01-19 RX ORDER — FUROSEMIDE 10 MG/ML
20 INJECTION INTRAMUSCULAR; INTRAVENOUS ONCE
Status: DISCONTINUED | OUTPATIENT
Start: 2020-01-19 | End: 2020-01-19

## 2020-01-19 RX ORDER — FUROSEMIDE 10 MG/ML
40 INJECTION INTRAMUSCULAR; INTRAVENOUS EVERY 12 HOURS
Status: DISCONTINUED | OUTPATIENT
Start: 2020-01-19 | End: 2020-01-20

## 2020-01-19 RX ADMIN — ALLOPURINOL 300 MG: 300 TABLET ORAL at 08:40

## 2020-01-19 RX ADMIN — TAMSULOSIN HYDROCHLORIDE 0.4 MG: 0.4 CAPSULE ORAL at 08:40

## 2020-01-19 RX ADMIN — ATORVASTATIN CALCIUM 10 MG: 10 TABLET, FILM COATED ORAL at 20:57

## 2020-01-19 RX ADMIN — CLOPIDOGREL BISULFATE 75 MG: 75 TABLET ORAL at 08:40

## 2020-01-19 RX ADMIN — MAGNESIUM HYDROXIDE 30 ML: 400 SUSPENSION ORAL at 17:07

## 2020-01-19 RX ADMIN — SODIUM CHLORIDE: 9 INJECTION, SOLUTION INTRAVENOUS at 01:38

## 2020-01-19 RX ADMIN — FUROSEMIDE 40 MG: 10 INJECTION, SOLUTION INTRAMUSCULAR; INTRAVENOUS at 12:13

## 2020-01-19 RX ADMIN — PAROXETINE HYDROCHLORIDE 20 MG: 20 TABLET, FILM COATED ORAL at 08:40

## 2020-01-19 RX ADMIN — ASPIRIN 81 MG: 81 TABLET, DELAYED RELEASE ORAL at 08:40

## 2020-01-19 ASSESSMENT — ACTIVITIES OF DAILY LIVING (ADL)
ADLS_ACUITY_SCORE: 23
ADLS_ACUITY_SCORE: 28
ADLS_ACUITY_SCORE: 25
ADLS_ACUITY_SCORE: 28
ADLS_ACUITY_SCORE: 25
ADLS_ACUITY_SCORE: 30

## 2020-01-19 NOTE — PLAN OF CARE
Patient here with MRI with infarct in the posterior right basal ganglia. A&0x4, forgetful at times. CMS with left arm and BLE edema, denies numbness or tingling. Neuros with garbled speech, left facial droop, left hemiparesis (LUE 1/5, RLE 2/5), patient can almost get left leg off the bed, reports feeling hopeful with small improvements he's seen today. Bedrest, ceiling lift for transfers and repositioning. Denies pain. Incontinent of bladder. Tolerating DD1/nectar liquid diet, good appetite. Occasional loose but non productive cough. Tele ST/BBB. Tentative discharge plans to [facility] tomorrow if medically stable. Patient will have a 30 day cardiac event monitor at discharge.

## 2020-01-19 NOTE — PROGRESS NOTES
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 Nonambulatory 2/2 back surgery and knee problems. Largely bedbound but able to transfer to power wheelchair, evening of presentation he noted L arm significantly weaker. Per family also garbled speech. Code CVA called in the ED. MRI with acute/ subacute infarct in the posterior R basal ganglia/ corona radiata region.  Echocardiogram very difficult study, unable to assess WMA. Bubble negative but poor quality. LDL 40/ HDL 30/ TG 83, troponin negative, A1C at 6.0%  Plan:  - neurology following and appreciate assistance  - plavix loaded and continue 75 mg daily x 3 weeks  - ASA 81 mg x 3 weeks then 325 mg daily  - q4 neuro checks  - telemetry  - OT/PT/SLP  - atorvastatin 10 mg daily   - EP consult requested by neurology for implantable loop recorder.  EP recommended 30-day event monitor if thoughts negative then consider implantable loop recorder  Pt can not discharge to TCU with event monitor so will plan ziopatch for 2weeks on discharge to TCU     Hypoxia in the setting of obstructive sleep apnea and possible fluid overload;  Patient is needing 3 L of oxygen by nasal cannula  He is not on oxygen prior to hospitalization   has been getting IV fluids since admission  Started on Lasix 40 mg IV twice daily for gentle diuresis today  Wean oxygen as tolerated with diuresis and reassess fluid status in a.m.  IV fluids discontinued today    BRAD  1/17 difficult to arouse but is arousable and able to answer questions. No obvious sedating medications given. Body habitus would be very consistent with BRAD.  ABG 7.36/59/66/34. with elevated bicarb appears to largely represent chronic  sleep disordered breathing. Wife reports that he is supposed to use CPAP at night at home but refuses. Somnolence much improved overnight.  Plan:  - monitor for now     HTN  PTA on metoprolol 21.5 mg BID, continued when okay with neurology. Currently held for permissive htn.     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 Afebrile with no leukocytosis. UC with candida tropicalis. Overall asymptomatic from urinary standpoint.   Plan:  - hold abx for now    BPH  PTA on tamsulosin 0.4 mg daily, continue    COPD  PTA on prn albuterol nebs, continue    FEN (fluids, electrolytes and nutrition): diet as per slp  Discussed with nursing.  DVT Prophylaxis: Pneumatic Compression Devices  Code Status: Full Code    Disposition: Expected discharge in 1-2 days  to TCU after diuresis    Thais Wilson MD      Interval History     No issues with somnolence today, patient was appropriately answering questions . Denies cp/sob. No vision changes. No new weakness or numbness today. No nausea/vomiting.  He is needing 3 L of oxygen by nasal cannula     -Data reviewed today: I reviewed all new labs and imaging results over the last 24 hours. I personally reviewed no images or EKG's today.    Physical Exam   Temp: 98  F (36.7  C) Temp src: Oral BP: (!) 140/75   Heart Rate: 109 Resp: 18 SpO2: 94 % O2 Device: Nasal cannula Oxygen Delivery: 3 LPM  Vitals:    01/15/20 1810   Weight: 122.7 kg (270 lb 6.4 oz)     Vital Signs with Ranges  Temp:  [97.4  F (36.3  C)-98.6  F (37  C)] 98  F (36.7  C)  Heart Rate:  [] 109  Resp:  [16-18] 18  BP: (110-144)/(66-81) 140/75  SpO2:  [94 %-95 %] 94 %  I/O last 3 completed shifts:  In: 720 [P.O.:720]  Out: -     Constitutional: Alert and awake with no acute distress  Respiratory: Bibasilar crackles heard on auscultation  Cardiovascular: Regular rate and rhythm, no murmurs  GI: Soft, obese, nontender  Skin/Integumen: No  erythema, cyanosis or edema  Other: R side with good strength. Appears to have normal strength in RLE. Left hemiparesis present.     Medications     - MEDICATION INSTRUCTIONS -         allopurinol  300 mg Oral Daily     aspirin  81 mg Oral Daily     atorvastatin  10 mg Oral QPM     clopidogrel  75 mg Oral Daily     furosemide  40 mg Intravenous Q12H     PARoxetine  20 mg Oral Daily     sodium chloride (PF)  3 mL Intracatheter Q8H     tamsulosin  0.4 mg Oral QAM       Data   Recent Labs   Lab 01/14/20  2356   WBC 8.7   HGB 13.4   MCV 93      INR 1.13      POTASSIUM 4.2   CHLORIDE 103   CO2 33*   BUN 24   CR 0.86   ANIONGAP 2*   RAJ 7.9*   *   TROPI <0.015       No results found for this or any previous visit (from the past 24 hour(s)).

## 2020-01-19 NOTE — PLAN OF CARE
Discharge Planner SLP   Patient plan for discharge: wants to go home  Current status: Pt seen for both swallow and communication tx during this session.     Swallow: PO trials limited given significant abdominal pain/ constipated feeling. He was agreeable to 4 oz of nectar thick prune juice. With tsp no signs/sx aspiration noted. With cup pt more impulsive today taking larger sips resulting in immediate strong coughing, highly suspect aspiration. Recommend: continue dysphagia diet level 1 with nectar thick liquids via tsp only. If any increased difficulty with nectar even via tsp only change back to honey thick liquids.     Communication: Pt able to recall 50% of dysarthria reduction strategies but required mod cues to utilize them through informal conversation and confrontation naming tasks. Will require reinforcement.     Barriers to return to prior living situation: dysphagia/aspiration risk, below baseline re: speech  Recommendations for discharge: TCU  Rationale for recommendations: ongoing SLP for both dysphagia/dysarthria as pt below baseline 2/2 acute CVA       Entered by: Carlyn Aguilar 01/19/2020 2:10 PM

## 2020-01-19 NOTE — PLAN OF CARE
Discharge Planner OT   Patient plan for discharge: TCU  Current status: pt seen briefly this PM. He declined transfer to EOB citing abdominal pain from constipation and was lying on a bedpan which he reports he had been doing much of day. Instructed him in SROM of LUE however his RUE is now pretty weak and he was unable to lift the left arm. Therefore, OT provided AAROM to LUE.   Barriers to return to prior living situation: current level of A  Recommendations for discharge: TCU  Rationale for recommendations:  pt unable to return home given current level of function -- will benefit from daily therapies in TCU setting to increase safety and independence with ADLs and mobilities prior to returning home.       Entered by: Gualberto Ashton 01/19/2020 3:48 PM

## 2020-01-19 NOTE — PROGRESS NOTES
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 Nonambulatory 2/2 back surgery and knee problems. Largely bedbound but able to transfer to power wheelchair, evening of presentation he noted L arm significantly weaker. Per family also garbled speech. Code CVA called in the ED. MRI with acute/ subacute infarct in the posterior R basal ganglia/ corona radiata region.  Echocardiogram very difficult study, unable to assess WMA. Bubble negative but poor quality. LDL 40/ HDL 30/ TG 83, troponin negative, A1C at 6.0%  Plan:  - neurology following and appreciate assistance  - plavix loaded and continue 75 mg daily x 3 weeks  - ASA 81 mg x 3 weeks then 325 mg daily  - q4 neuro checks  - telemetry  - OT/PT/SLP  - atorvastatin 10 mg daily       BRAD  1/17 difficult to arouse but is arousable and able to answer questions. No obvious sedating medications given. Body habitus would be very consistent with BRAD.  ABG 7.36/59/66/34. with elevated bicarb appears to largely represent chronic sleep disordered breathing. Wife reports that he is supposed to use CPAP at night at home but refuses. Somnolence much improved overnight.  Plan:  - monitor for now     HTN  PTA on metoprolol 21.5 mg BID, continued when okay with neurology. Currently held for permissive htn.     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 Afebrile with no leukocytosis. UC with candida tropicalis. Overall asymptomatic from urinary standpoint.   Plan:  - hold abx for now    BPH  PTA on tamsulosin 0.4 mg daily, continue    COPD  PTA on prn albuterol nebs, continue    FEN (fluids,  electrolytes and nutrition): diet as per slp, IV fluids until taking adequate PO  Discussed with nursing.  DVT Prophylaxis: Pneumatic Compression Devices  Code Status: Full Code    Disposition: Expected discharge in 1-2 days pending placement.     Thais Wilson MD  Text Page    Interval History     No issues with somnolence today, patient was appropriately answering questions once aroused. Denies cp/sob. No vision changes. No new weakness or numbness today. No nausea/vomiting. Video swallow study planned for today.     -Data reviewed today: I reviewed all new labs and imaging results over the last 24 hours. I personally reviewed no images or EKG's today.    Physical Exam   Temp: 98  F (36.7  C) Temp src: Oral BP: (!) 140/75   Heart Rate: 109 Resp: 18 SpO2: 94 % O2 Device: Nasal cannula Oxygen Delivery: 3 LPM  Vitals:    01/15/20 1810   Weight: 122.7 kg (270 lb 6.4 oz)     Vital Signs with Ranges  Temp:  [97.4  F (36.3  C)-98.6  F (37  C)] 98  F (36.7  C)  Heart Rate:  [] 109  Resp:  [16-18] 18  BP: (110-144)/(66-81) 140/75  SpO2:  [94 %-95 %] 94 %  I/O last 3 completed shifts:  In: 720 [P.O.:720]  Out: -     Constitutional: Alert and awake with no acute distress  Respiratory: Lungs clear to auscultation bilaterally anteriorly   Cardiovascular: Regular rate and rhythm, no murmurs  GI: Soft, obese, nontender  Skin/Integumen: No erythema, cyanosis or edema  Other: R side with good strength. Appears to have normal strength in RLE. Left hemiparesis present.     Medications     - MEDICATION INSTRUCTIONS -         allopurinol  300 mg Oral Daily     aspirin  81 mg Oral Daily     atorvastatin  10 mg Oral QPM     clopidogrel  75 mg Oral Daily     furosemide  40 mg Intravenous Q12H     PARoxetine  20 mg Oral Daily     sodium chloride (PF)  3 mL Intracatheter Q8H     tamsulosin  0.4 mg Oral QAM       Data   Recent Labs   Lab 01/14/20  2356   WBC 8.7   HGB 13.4   MCV 93      INR 1.13      POTASSIUM 4.2    CHLORIDE 103   CO2 33*   BUN 24   CR 0.86   ANIONGAP 2*   RAJ 7.9*   *   TROPI <0.015       No results found for this or any previous visit (from the past 24 hour(s)).

## 2020-01-19 NOTE — PLAN OF CARE
VSS on 2LPM via nasal cannula, ex tachy. A/Ox4, neuros intact, CMS intact, except left sided weakness. Scab on right knee, blanchable erythema on coccyx with mepilex in place. Denies pain. Repositioned q2h with lift and asstx2.  DD1 with nectar thickened liquid diet. BS active, Flatus +, BM to bedpan today. Voiding adequately with purewik in place for incontinence. LS with fine crackles in bases, diuresing with lasix initiated today. Plan for discharge to TCU tomorrow pending Cardiac Event Monitor placement to be placed tomorrow.

## 2020-01-19 NOTE — PLAN OF CARE
AVSS On NC. Denies pain. Left sided weakness; No motor response in LUE, limited response in LLE. Up with lift. LS dim with. Diet DD1 with nectar thick liquids. Up with assist of 1. BS active and audible. INC of urine; pure wick in place.

## 2020-01-19 NOTE — PROGRESS NOTES
[facility] Progress Note  Chart Reviewed, Pt discussed in Interdisciplinary Rounds.   Pt anticipated to discharge to [facility].    Intervention:   JEROME confirmed with Aurelia at [facility] that they have a bed for pt and is able to accept pt for admission today.  JEROME updated pt's wife of possible discharge this afternoon and she states that pt will need HE transport via w/c at discharge.  Pt seen by hospitalist and determined to be fluid overloaded and wants to hold discharge for today.  JEROME updated Keyla at [facility]. JEROME advised to call in am and check with admissions regarding bed availability.  JEROME contacted pt's wife and left voice message on cell phone regarding delay in discharge.     Team Members notified:   JONATHANRN    Plan: Discharge to TCU; potentially [facility]    Barriers: medical clearance  Follow up plan: JEROME will check with M following rounding on Monday.    Shawnee Cast [facility]  [phone]

## 2020-01-19 NOTE — PROGRESS NOTES
"SPIRITUAL HEALTH SERVICES Progress Note  [facility] 0705     visit per pt request. Pt said he was having \"one of his better days\" and described his condition as \"having a gimp in his arm\" and that \"he usually doesn't talk this bad\". 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 SH validated feelings of accomplishment for sobriety as well as having been  so long. Pt noted wife has been battling cancer for 7 months and that chemo has been hard for her, but it was nice that they could facetime and see her smile. SH offered prayer and emotional/spiritual support. SH remains available for f/u if requested.     Joseline Jones   Intern  "

## 2020-01-20 ENCOUNTER — SURGERY (OUTPATIENT)
Age: 80
End: 2020-01-20
Payer: COMMERCIAL

## 2020-01-20 LAB
ANION GAP SERPL CALCULATED.3IONS-SCNC: 2 MMOL/L (ref 3–14)
BUN SERPL-MCNC: 8 MG/DL (ref 7–30)
CALCIUM SERPL-MCNC: 8.6 MG/DL (ref 8.5–10.1)
CHLORIDE SERPL-SCNC: 96 MMOL/L (ref 94–109)
CO2 SERPL-SCNC: 36 MMOL/L (ref 20–32)
CREAT SERPL-MCNC: 0.71 MG/DL (ref 0.66–1.25)
GFR SERPL CREATININE-BSD FRML MDRD: >90 ML/MIN/{1.73_M2}
GLUCOSE SERPL-MCNC: 110 MG/DL (ref 70–99)
POTASSIUM SERPL-SCNC: 3.6 MMOL/L (ref 3.4–5.3)
SODIUM SERPL-SCNC: 134 MMOL/L (ref 133–144)

## 2020-01-20 PROCEDURE — 25000128 H RX IP 250 OP 636: Performed by: INTERNAL MEDICINE

## 2020-01-20 PROCEDURE — 99239 HOSP IP/OBS DSCHRG MGMT >30: CPT | Performed by: STUDENT IN AN ORGANIZED HEALTH CARE EDUCATION/TRAINING PROGRAM

## 2020-01-20 PROCEDURE — 4A12X45 MONITORING OF CARDIAC ELECTRICAL ACTIVITY, AMBULATORY, EXTERNAL APPROACH: ICD-10-PCS | Performed by: INTERNAL MEDICINE

## 2020-01-20 PROCEDURE — 25000132 ZZH RX MED GY IP 250 OP 250 PS 637: Performed by: INTERNAL MEDICINE

## 2020-01-20 PROCEDURE — 25000125 ZZHC RX 250: Performed by: INTERNAL MEDICINE

## 2020-01-20 PROCEDURE — 27210794 ZZH OR GENERAL SUPPLY STERILE: Performed by: INTERNAL MEDICINE

## 2020-01-20 PROCEDURE — 25800030 ZZH RX IP 258 OP 636: Performed by: STUDENT IN AN ORGANIZED HEALTH CARE EDUCATION/TRAINING PROGRAM

## 2020-01-20 PROCEDURE — 33285 INSJ SUBQ CAR RHYTHM MNTR: CPT | Performed by: INTERNAL MEDICINE

## 2020-01-20 PROCEDURE — 93010 ELECTROCARDIOGRAM REPORT: CPT | Performed by: INTERNAL MEDICINE

## 2020-01-20 PROCEDURE — 0JH602Z INSERTION OF MONITORING DEVICE INTO CHEST SUBCUTANEOUS TISSUE AND FASCIA, OPEN APPROACH: ICD-10-PCS | Performed by: INTERNAL MEDICINE

## 2020-01-20 PROCEDURE — 25000132 ZZH RX MED GY IP 250 OP 250 PS 637: Performed by: HOSPITALIST

## 2020-01-20 PROCEDURE — 80048 BASIC METABOLIC PNL TOTAL CA: CPT | Performed by: INTERNAL MEDICINE

## 2020-01-20 PROCEDURE — 40000852 ZZH STATISTIC HEART CATH LAB OR EP LAB

## 2020-01-20 PROCEDURE — C1764 EVENT RECORDER, CARDIAC: HCPCS | Performed by: INTERNAL MEDICINE

## 2020-01-20 PROCEDURE — 93005 ELECTROCARDIOGRAM TRACING: CPT

## 2020-01-20 PROCEDURE — 25000125 ZZHC RX 250: Performed by: STUDENT IN AN ORGANIZED HEALTH CARE EDUCATION/TRAINING PROGRAM

## 2020-01-20 PROCEDURE — 12000000 ZZH R&B MED SURG/OB

## 2020-01-20 PROCEDURE — 36415 COLL VENOUS BLD VENIPUNCTURE: CPT | Performed by: INTERNAL MEDICINE

## 2020-01-20 DEVICE — SYS INS CARD MNTR REVEAL LINQ: Type: IMPLANTABLE DEVICE | Status: FUNCTIONAL

## 2020-01-20 RX ORDER — NALOXONE HYDROCHLORIDE 0.4 MG/ML
.1-.4 INJECTION, SOLUTION INTRAMUSCULAR; INTRAVENOUS; SUBCUTANEOUS
Status: DISCONTINUED | OUTPATIENT
Start: 2020-01-20 | End: 2020-01-20

## 2020-01-20 RX ORDER — ATORVASTATIN CALCIUM 10 MG/1
10 TABLET, FILM COATED ORAL EVERY EVENING
DISCHARGE
Start: 2020-01-20 | End: 2021-02-08

## 2020-01-20 RX ORDER — CLOPIDOGREL BISULFATE 75 MG/1
75 TABLET ORAL DAILY
Qty: 17 TABLET | Refills: 0 | DISCHARGE
Start: 2020-01-21 | End: 2020-02-12

## 2020-01-20 RX ORDER — METOPROLOL TARTRATE 25 MG/1
25 TABLET, FILM COATED ORAL 2 TIMES DAILY
Status: DISCONTINUED | OUTPATIENT
Start: 2020-01-20 | End: 2020-01-23 | Stop reason: HOSPADM

## 2020-01-20 RX ORDER — ASPIRIN 325 MG
325 TABLET, DELAYED RELEASE (ENTERIC COATED) ORAL DAILY
Status: ON HOLD | DISCHARGE
Start: 2020-02-07 | End: 2021-06-03

## 2020-01-20 RX ORDER — FUROSEMIDE 20 MG
20 TABLET ORAL DAILY
Qty: 3 TABLET | Refills: 0 | DISCHARGE
Start: 2020-01-20 | End: 2020-01-24

## 2020-01-20 RX ORDER — OXYCODONE AND ACETAMINOPHEN 5; 325 MG/1; MG/1
1 TABLET ORAL EVERY 4 HOURS PRN
Status: DISCONTINUED | OUTPATIENT
Start: 2020-01-20 | End: 2020-01-23 | Stop reason: HOSPADM

## 2020-01-20 RX ORDER — METOPROLOL TARTRATE 1 MG/ML
5 INJECTION, SOLUTION INTRAVENOUS EVERY 8 HOURS PRN
Status: DISCONTINUED | OUTPATIENT
Start: 2020-01-20 | End: 2020-01-23 | Stop reason: HOSPADM

## 2020-01-20 RX ORDER — METOPROLOL TARTRATE 1 MG/ML
5 INJECTION, SOLUTION INTRAVENOUS EVERY 8 HOURS
Status: DISCONTINUED | OUTPATIENT
Start: 2020-01-20 | End: 2020-01-20

## 2020-01-20 RX ORDER — TAMSULOSIN HYDROCHLORIDE 0.4 MG/1
0.4 CAPSULE ORAL EVERY MORNING
Qty: 14 CAPSULE | Refills: 0 | DISCHARGE
Start: 2020-01-20 | End: 2020-02-05

## 2020-01-20 RX ADMIN — METOPROLOL TARTRATE 5 MG: 5 INJECTION INTRAVENOUS at 17:44

## 2020-01-20 RX ADMIN — ATORVASTATIN CALCIUM 10 MG: 10 TABLET, FILM COATED ORAL at 20:04

## 2020-01-20 RX ADMIN — ALLOPURINOL 300 MG: 300 TABLET ORAL at 12:15

## 2020-01-20 RX ADMIN — PAROXETINE HYDROCHLORIDE 20 MG: 20 TABLET, FILM COATED ORAL at 12:18

## 2020-01-20 RX ADMIN — TAMSULOSIN HYDROCHLORIDE 0.4 MG: 0.4 CAPSULE ORAL at 09:53

## 2020-01-20 RX ADMIN — CLOPIDOGREL BISULFATE 75 MG: 75 TABLET ORAL at 12:17

## 2020-01-20 RX ADMIN — ASPIRIN 81 MG: 81 TABLET, DELAYED RELEASE ORAL at 12:16

## 2020-01-20 RX ADMIN — Medication 1 LOZENGE: at 20:52

## 2020-01-20 RX ADMIN — FUROSEMIDE 40 MG: 10 INJECTION, SOLUTION INTRAMUSCULAR; INTRAVENOUS at 00:03

## 2020-01-20 RX ADMIN — SODIUM CHLORIDE 500 ML: 9 INJECTION, SOLUTION INTRAVENOUS at 17:58

## 2020-01-20 RX ADMIN — FUROSEMIDE 40 MG: 10 INJECTION, SOLUTION INTRAMUSCULAR; INTRAVENOUS at 12:19

## 2020-01-20 RX ADMIN — LIDOCAINE HYDROCHLORIDE 10 ML: 10 INJECTION, SOLUTION EPIDURAL; INFILTRATION; INTRACAUDAL; PERINEURAL at 11:26

## 2020-01-20 ASSESSMENT — ACTIVITIES OF DAILY LIVING (ADL)
ADLS_ACUITY_SCORE: 26
ADLS_ACUITY_SCORE: 26
ADLS_ACUITY_SCORE: 25
ADLS_ACUITY_SCORE: 23

## 2020-01-20 ASSESSMENT — MIFFLIN-ST. JEOR: SCORE: 1958

## 2020-01-20 NOTE — PLAN OF CARE
Patient here with a right basil ganglia stroke. VSS - tachycardic with activity/repositioning. Alert and oriented x4, forgetful at times. Denies pain or headache. Denies numbness or tingling. Neuros with left facial droop, garbled speech, left weakness (LUE 1/5, LLE 2/5) and swallowing difficulty. BLE edema and LUE edema. Incontinent of urine, purewick in place. One small BM this evening after MOM and prune juice. Bedrest, lift and A2 for turns and repositioning. Tolerating DD1/nectar diet, appetite is fair. Tele SR/BBB. Plan is for discharge to [facility] tomorrow.

## 2020-01-20 NOTE — PROVIDER NOTIFICATION
MD Notification    Notified Person: MD    Notified Person Name: Dr. Maradiaga    Notification Date/Time: 01/20/2020    Notification Interaction: Web Based Paging and phone call    Purpose of Notification:  Pt discharging today to TCU. What would you like to do about IV Lasix? Please advise. Thank you. Pt also having urinary retention this morning.    Orders Received: Will change to oral lasix, MD aware of bladder discomfort.    Comments:

## 2020-01-20 NOTE — PROGRESS NOTES
Spoke to patient's wife Yuli about recommended Neurology follow up.  Information for MAIRA Chávez put on discharge instructions per her request.    Monserrat Aguilar RN, BSN, PHN  Inpatient Care Coordination  [facility]  Phone: [phone]

## 2020-01-20 NOTE — DISCHARGE SUMMARY
[facility]  Hospitalist Discharge Summary       Date of Admission:  1/14/2020  Date of Discharge:  1/20/2020  Discharging Provider: Sandro Maradiaga MD      Discharge Diagnoses     CVA    Follow-ups Needed After Discharge   Follow-up Appointments     Follow Up and recommended labs and tests      Follow up with longterm physician.  The following labs/tests are   recommended: None.    Please follow up with neurology in 4-6 weeks. You may call  the following   neurology clinic for an appointment or a clinic of your choice. Tell them   you were recently hospitalized and need follow up as recommended at   discharge.    [facility]  [address], [address]  [place] [address]  [phone]           Unresulted Labs Ordered in the Past 30 Days of this Admission     No orders found from 12/15/2019 to 1/15/2020.        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 Nonambulatory 2/2 back surgery and knee problems. Largely bedbound but able to transfer to power wheelchair, evening of presentation he noted L arm significantly weaker. Per family also garbled speech. Code CVA called in the ED. MRI with acute/ subacute infarct in the posterior R basal ganglia/ corona radiata region.  Echocardiogram very difficult study, unable to assess WMA. Bubble negative but poor quality. LDL 40/ HDL 30/ TG 83, troponin negative, A1C at 6.0%  Plan:  - plavix loaded and continue 75 mg daily for total of x 3 weeks  - ASA 81 mg for total x 3 weeks then 325 mg daily  - OT/PT/SLP  - atorvastatin 10 mg daily   - EP consult requested by neurology for implantable loop recorder, placed uneventfully 01/20.    Hypoxia in the setting of  obstructive sleep apnea and possible fluid overload;  Patient is needing 3 L of oxygen by nasal cannula  He is not on oxygen prior to hospitalization   has been getting IV fluids since admission. Oxygenation improved with IV lasix  Plan:  - Short course of PO lasix    BRAD  1/17 difficult to arouse but is arousable and able to answer questions. No obvious sedating medications given. Body habitus would be very consistent with BRAD.  ABG 7.36/59/66/34. with elevated bicarb appears to largely represent chronic sleep disordered breathing. Wife reports that he is supposed to use CPAP at night at home but refuses. Somnolence much improved, mental status at baseline.     HTN  PTA on metoprolol 12.5 mg BID, this was resumed at discharge.     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 Afebrile with no leukocytosis. UC with candida tropicalis. Overall asymptomatic from urinary standpoint.   Plan:  - Cardona catheter with voiding trial as outpatient  - Continue flomax  - hold abx for now    BPH  PTA on tamsulosin 0.4 mg daily, continue    COPD  PTA on prn albuterol nebs, continue    Consultations This Hospital Stay   NEUROLOGY IP CONSULT  PHYSICAL THERAPY ADULT IP CONSULT  OCCUPATIONAL THERAPY ADULT IP CONSULT  SPEECH LANGUAGE PATH ADULT IP CONSULT  SWALLOW EVAL SPEECH PATH AT BEDSIDE IP CONSULT  SMOKING CESSATION PROGRAM IP CONSULT  PATIENT LEARNING CENTER IP CONSULT  CARE TRANSITION RN/SW IP CONSULT  CARDIOLOGY IP CONSULT  ELECTROPHYSIOLOGY IP CONSULT  PHYSICAL THERAPY ADULT IP CONSULT  OCCUPATIONAL THERAPY ADULT IP CONSULT  SPEECH LANGUAGE PATH ADULT IP CONSULT    Code Status   Full Code    Time Spent on this Encounter   I, Sandro Maradiaga MD, personally saw the patient today and spent greater than 30 minutes discharging this patient.       Sandro Maradiaga MD  [facility]  Hospital  ______________________________________________________________________    Physical Exam   Vital Signs: Temp: 97.8  F (36.6  C) Temp src: Oral BP: 127/77 Pulse: 110 Heart Rate: 102 Resp: 16 SpO2: 95 % O2 Device: Nasal cannula Oxygen Delivery: 3 LPM  Weight: 263 lbs 7.2 oz       Primary Care Physician   Augustin Thomas    Discharge Orders      Discharge Instructions - Keep incision dry for 72 hours    Keep incision dry for 72 hours (unless Derma Darling was applied)     Discharge Instructions - Follow up with Device Check RN     Follow up with Device Check RN in 7-10 days.     General info for SNF    Length of Stay Estimate: Short Term Care: Estimated # of Days <30  Condition at Discharge: Stable  Level of care:skilled   Rehabilitation Potential: Fair  Admission H&P remains valid and up-to-date: Yes  Recent Chemotherapy: N/A  Use Nursing Home Standing Orders: Yes     Mantoux instructions    Give two-step Mantoux (PPD) Per Facility Policy Yes     Reason for your hospital stay    You had a stroke and needed neurology evaluation and therapies, discharged to rehab facility.     Intake and output    Every shift     Daily weights    Call Provider for weight gain of more than 2 pounds per day or 5 pounds per week.     Follow Up and recommended labs and tests    Follow up with [facility] physician.  The following labs/tests are recommended: None.    Please follow up with neurology in 4-6 weeks. You may call  the following neurology clinic for an appointment or a clinic of your choice. Tell them you were recently hospitalized and need follow up as recommended at discharge.    [facility]  [address], [address]  [place] [address]  [phone]     Activity - Up with assistive device     Cardnoa catheter    To straight gravity drainage. Change catheter every 2 weeks and PRN for leaking or decreased uring output with signs of bladder distention. DO NOT change catheter without a specific MD order  IF diagnosis of benign prostatic hypertrophy (BPH), neurogenic bladder, or other urological conditions     Physical Therapy Adult Consult    Evaluate and treat as clinically indicated.    Reason:  cva     Occupational Therapy Adult Consult    Evaluate and treat as clinically indicated.    Reason:  CVA     Speech Language Path Adult Consult    Evaluate and treat as clinically indicated.    Reason:  CVA - dysphagia     Oxygen - Nasal cannula    2 Lpm by nasal cannula to keep O2 sats 92% or greater.     Fall precautions     Advance Diet as Tolerated    Follow this diet upon discharge: Orders Placed This Encounter      Room Service      Advance Diet as Tolerated: dysphagia diet level 1 with nectar thick liquids via tsp only       Significant Results and Procedures   Most Recent 3 CBC's:  Recent Labs   Lab Test 01/14/20  2356 10/15/18  0738 10/13/18  0705 10/12/18  2032   WBC 8.7  --  7.6 8.2   HGB 13.4  --  11.0* 10.9*   MCV 93  --  92 92    193 181 190     Most Recent 3 BMP's:  Recent Labs   Lab Test 01/20/20  0845 01/14/20  2356 10/13/18  0705    138 140   POTASSIUM 3.6 4.2 4.3   CHLORIDE 96 103 107   CO2 36* 33* 31   BUN 8 24 18   CR 0.71 0.86 0.83   ANIONGAP 2* 2* 2*   RAJ 8.6 7.9* 7.8*   * 103* 104*     Most Recent 2 LFT's:  Recent Labs   Lab Test 10/12/18  2032 10/04/17  1400   AST 22 11   ALT 34 19   ALKPHOS 57 83   BILITOTAL 0.5 0.5     Most Recent 3 INR's:  Recent Labs   Lab Test 01/14/20 2356   INR 1.13     Most Recent 3 Troponin's:  Recent Labs   Lab Test 01/14/20  2356 10/12/18  2032   TROPI <0.015 <0.015     Most Recent 3 BNP's:  Recent Labs   Lab Test 10/12/18  2032   NTBNPI 601   ,   Results for orders placed or performed during the hospital encounter of 01/14/20   CT Head w/o Contrast    Narrative    EXAM: CT HEAD W/O CONTRAST, CTA  HEAD NECK WITH CONTRAST, CT HEAD PERFUSION WITH CONTRAST EXAM: CT HEAD W/O CONTRAST, CTA  HEAD NECK WITH CONTRAST, CT HEAD PERFUSION WITH  CONTRAST  LOCATION: [facility]  DATE/TIME: 1/15/2020 12:21 AM    INDICATION: Left-sided weakness and slurred speech.  COMPARISON: None.  TECHNIQUE: Head and neck CT angiogram with IV contrast. Noncontrast head CT followed by axial helical CT images of the head and neck vessels obtained during the arterial phase of intravenous contrast administration. Axial 2D reconstructed images and   multiplanar 3D MIP reconstructed images of the head and neck vessels were performed by the technologist. Additional CT cerebral perfusion was performed utilizing a second contrast bolus. Perfusion data were post processed with generation of standard   perfusion maps and estimation of ischemic/infarcted volumes utilizing standard threshold values. Dose reduction techniques were used.  All stenosis measurements made according to NASCET criteria unless otherwise specified.  CONTRAST: 70mL Isovue-370 (accession [id number]), 50mL Isovue-370 (accession [id number]), 70mL Isovue-370 (accession [id number])    FINDINGS:   NONCONTRAST HEAD CT:   INTRACRANIAL CONTENTS: No intracranial hemorrhage, extraaxial collection, or mass effect.  No CT evidence of acute infarct. There is diffuse scattered low-attenuation within the periventricular and subcortical white matter consistent with diffuse small   vessel ischemic disease. There are also probable multiple old lacunar infarcts involving the basal ganglia bilaterally and the right side of the julio césar. Mild to moderate generalized volume loss. No hydrocephalus. Ventricular system, basal cisterns and the   cortical sulci are consistent with mild volume loss.     VISUALIZED ORBITS/SINUSES/MASTOIDS: No intraorbital abnormality. No paranasal sinus mucosal disease. No middle ear or mastoid effusion.    BONES/SOFT TISSUES: No acute abnormality.    HEAD CTA:  ANTERIOR CIRCULATION: No stenosis/occlusion, aneurysm, or high flow vascular malformation. There is a patent anterior communicating  artery.    POSTERIOR CIRCULATION: No stenosis/occlusion, aneurysm, or high flow vascular malformation. Balanced vertebral arteries supply a normal basilar artery.     DURAL VENOUS SINUSES: Expected enhancement of the major dural venous sinuses.    NECK CTA:  RIGHT CAROTID: Atherosclerotic plaque results in less than 50% stenosis in the right ICA. No dissection.    LEFT CAROTID: No measurable stenosis or dissection.    VERTEBRAL ARTERIES: No focal stenosis or dissection. Balanced vertebral arteries.    AORTIC ARCH: There is a bovine arch configuration of the great vessels off the aortic arch with no significant stenosis of their origins.    NONVASCULAR STRUCTURES: There is airspace disease involving the right lung apex. There are diffuse degenerative changes of the cervical spine visualized.    CT PERFUSION:  PERFUSION MAPS: Relatively symmetrical cerebral perfusion. No focal deficits in cerebral blood flow or volume to suggest ischemia/oligemia.    RAPID ANALYSIS:  CBF<30%: 0 ml.  Tmax>6sec: 3 ml.  Mismatch volume: 3 ml.  Mismatch ratio: infinite.        Impression    IMPRESSION:   HEAD CT:  1.  No CT finding of a mass, hemorrhage or focal area suggestive of acute infarct.  2.  Diffuse age related changes along with old lacunar infarcts basal ganglia bilaterally and right side of the julio césar.    HEAD CTA:   1.  No discrete aneurysm, vascular malformation, vessel occlusion or significant stenosis involving arteries of the [place].    NECK CTA:  1.  Bovine arch configuration of great vessels off aortic arch with no significant stenosis of their origins.  2.  No significant stenosis or irregularity involving the arteries of the neck by NASCET criteria.  3. No radiographic evidence of dissection.    CT PERFUSION:  1.  Relatively symmetric cerebral perfusion.      Head CT findings were communicated to Dr. Helm at 12:15 AM. CTA results were communicated to Dr. Helm at 12:45 AM on 01/15/2020.   CTA Head  Neck with Contrast    Narrative    EXAM: CT HEAD W/O CONTRAST, CTA  HEAD NECK WITH CONTRAST, CT HEAD PERFUSION WITH CONTRAST EXAM: CT HEAD W/O CONTRAST, CTA  HEAD NECK WITH CONTRAST, CT HEAD PERFUSION WITH CONTRAST  LOCATION: [facility]  DATE/TIME: 1/15/2020 12:21 AM    INDICATION: Left-sided weakness and slurred speech.  COMPARISON: None.  TECHNIQUE: Head and neck CT angiogram with IV contrast. Noncontrast head CT followed by axial helical CT images of the head and neck vessels obtained during the arterial phase of intravenous contrast administration. Axial 2D reconstructed images and   multiplanar 3D MIP reconstructed images of the head and neck vessels were performed by the technologist. Additional CT cerebral perfusion was performed utilizing a second contrast bolus. Perfusion data were post processed with generation of standard   perfusion maps and estimation of ischemic/infarcted volumes utilizing standard threshold values. Dose reduction techniques were used.  All stenosis measurements made according to NASCET criteria unless otherwise specified.  CONTRAST: 70mL Isovue-370 (accession [id number]), 50mL Isovue-370 (accession [id number]), 70mL Isovue-370 (accession [id number])    FINDINGS:   NONCONTRAST HEAD CT:   INTRACRANIAL CONTENTS: No intracranial hemorrhage, extraaxial collection, or mass effect.  No CT evidence of acute infarct. There is diffuse scattered low-attenuation within the periventricular and subcortical white matter consistent with diffuse small   vessel ischemic disease. There are also probable multiple old lacunar infarcts involving the basal ganglia bilaterally and the right side of the julio césar. Mild to moderate generalized volume loss. No hydrocephalus. Ventricular system, basal cisterns and the   cortical sulci are consistent with mild volume loss.     VISUALIZED ORBITS/SINUSES/MASTOIDS: No intraorbital abnormality. No paranasal sinus mucosal disease. No middle ear or mastoid  effusion.    BONES/SOFT TISSUES: No acute abnormality.    HEAD CTA:  ANTERIOR CIRCULATION: No stenosis/occlusion, aneurysm, or high flow vascular malformation. There is a patent anterior communicating artery.    POSTERIOR CIRCULATION: No stenosis/occlusion, aneurysm, or high flow vascular malformation. Balanced vertebral arteries supply a normal basilar artery.     DURAL VENOUS SINUSES: Expected enhancement of the major dural venous sinuses.    NECK CTA:  RIGHT CAROTID: Atherosclerotic plaque results in less than 50% stenosis in the right ICA. No dissection.    LEFT CAROTID: No measurable stenosis or dissection.    VERTEBRAL ARTERIES: No focal stenosis or dissection. Balanced vertebral arteries.    AORTIC ARCH: There is a bovine arch configuration of the great vessels off the aortic arch with no significant stenosis of their origins.    NONVASCULAR STRUCTURES: There is airspace disease involving the right lung apex. There are diffuse degenerative changes of the cervical spine visualized.    CT PERFUSION:  PERFUSION MAPS: Relatively symmetrical cerebral perfusion. No focal deficits in cerebral blood flow or volume to suggest ischemia/oligemia.    RAPID ANALYSIS:  CBF<30%: 0 ml.  Tmax>6sec: 3 ml.  Mismatch volume: 3 ml.  Mismatch ratio: infinite.        Impression    IMPRESSION:   HEAD CT:  1.  No CT finding of a mass, hemorrhage or focal area suggestive of acute infarct.  2.  Diffuse age related changes along with old lacunar infarcts basal ganglia bilaterally and right side of the julio césar.    HEAD CTA:   1.  No discrete aneurysm, vascular malformation, vessel occlusion or significant stenosis involving arteries of the [place].    NECK CTA:  1.  Bovine arch configuration of great vessels off aortic arch with no significant stenosis of their origins.  2.  No significant stenosis or irregularity involving the arteries of the neck by NASCET criteria.  3. No radiographic evidence of dissection.    CT PERFUSION:  1.   Relatively symmetric cerebral perfusion.      Head CT findings were communicated to Dr. Helm at 12:15 AM. CTA results were communicated to Dr. Helm at 12:45 AM on 01/15/2020.   CT Head Perfusion w Contrast    Narrative    EXAM: CT HEAD W/O CONTRAST, CTA  HEAD NECK WITH CONTRAST, CT HEAD PERFUSION WITH CONTRAST EXAM: CT HEAD W/O CONTRAST, CTA  HEAD NECK WITH CONTRAST, CT HEAD PERFUSION WITH CONTRAST  LOCATION: [facility]  DATE/TIME: 1/15/2020 12:21 AM    INDICATION: Left-sided weakness and slurred speech.  COMPARISON: None.  TECHNIQUE: Head and neck CT angiogram with IV contrast. Noncontrast head CT followed by axial helical CT images of the head and neck vessels obtained during the arterial phase of intravenous contrast administration. Axial 2D reconstructed images and   multiplanar 3D MIP reconstructed images of the head and neck vessels were performed by the technologist. Additional CT cerebral perfusion was performed utilizing a second contrast bolus. Perfusion data were post processed with generation of standard   perfusion maps and estimation of ischemic/infarcted volumes utilizing standard threshold values. Dose reduction techniques were used.  All stenosis measurements made according to NASCET criteria unless otherwise specified.  CONTRAST: 70mL Isovue-370 (accession [id number]), 50mL Isovue-370 (accession [id number]), 70mL Isovue-370 (accession [id number])    FINDINGS:   NONCONTRAST HEAD CT:   INTRACRANIAL CONTENTS: No intracranial hemorrhage, extraaxial collection, or mass effect.  No CT evidence of acute infarct. There is diffuse scattered low-attenuation within the periventricular and subcortical white matter consistent with diffuse small   vessel ischemic disease. There are also probable multiple old lacunar infarcts involving the basal ganglia bilaterally and the right side of the julio césar. Mild to moderate generalized volume loss. No hydrocephalus. Ventricular system, basal cisterns  and the   cortical sulci are consistent with mild volume loss.     VISUALIZED ORBITS/SINUSES/MASTOIDS: No intraorbital abnormality. No paranasal sinus mucosal disease. No middle ear or mastoid effusion.    BONES/SOFT TISSUES: No acute abnormality.    HEAD CTA:  ANTERIOR CIRCULATION: No stenosis/occlusion, aneurysm, or high flow vascular malformation. There is a patent anterior communicating artery.    POSTERIOR CIRCULATION: No stenosis/occlusion, aneurysm, or high flow vascular malformation. Balanced vertebral arteries supply a normal basilar artery.     DURAL VENOUS SINUSES: Expected enhancement of the major dural venous sinuses.    NECK CTA:  RIGHT CAROTID: Atherosclerotic plaque results in less than 50% stenosis in the right ICA. No dissection.    LEFT CAROTID: No measurable stenosis or dissection.    VERTEBRAL ARTERIES: No focal stenosis or dissection. Balanced vertebral arteries.    AORTIC ARCH: There is a bovine arch configuration of the great vessels off the aortic arch with no significant stenosis of their origins.    NONVASCULAR STRUCTURES: There is airspace disease involving the right lung apex. There are diffuse degenerative changes of the cervical spine visualized.    CT PERFUSION:  PERFUSION MAPS: Relatively symmetrical cerebral perfusion. No focal deficits in cerebral blood flow or volume to suggest ischemia/oligemia.    RAPID ANALYSIS:  CBF<30%: 0 ml.  Tmax>6sec: 3 ml.  Mismatch volume: 3 ml.  Mismatch ratio: infinite.        Impression    IMPRESSION:   HEAD CT:  1.  No CT finding of a mass, hemorrhage or focal area suggestive of acute infarct.  2.  Diffuse age related changes along with old lacunar infarcts basal ganglia bilaterally and right side of the julio césar.    HEAD CTA:   1.  No discrete aneurysm, vascular malformation, vessel occlusion or significant stenosis involving arteries of the [place].    NECK CTA:  1.  Bovine arch configuration of great vessels off aortic arch with no  significant stenosis of their origins.  2.  No significant stenosis or irregularity involving the arteries of the neck by NASCET criteria.  3. No radiographic evidence of dissection.    CT PERFUSION:  1.  Relatively symmetric cerebral perfusion.      Head CT findings were communicated to Dr. Helm at 12:15 AM. CTA results were communicated to Dr. Helm at 12:45 AM on 01/15/2020.   XR Chest Port 1 View    Narrative    EXAM: XR CHEST PORT 1 VW  LOCATION: [facility]  DATE/TIME: 1/15/2020 12:31 AM    INDICATION: Code stroke.    COMPARISON: None.    FINDINGS: Upright portable chest. The heart size is normal. The thoracic aorta is tortuous. The lungs are clear. No pneumothorax.      Impression    IMPRESSION: No acute abnormality.   MRI Brain w & w/o contrast    Narrative    MRI BRAIN WITHOUT AND WITH CONTRAST  1/15/2020 9:22 PM     HISTORY:  Stroke, follow up.     TECHNIQUE:  Multiplanar, multisequence MRI of the brain without and  with 12 mL Gadavist.     COMPARISON: Head CT 1/14/2020. Brain MR 7/13/2006.     FINDINGS: Images are degraded by motion artifact.    Area of restricted diffusion in the posterior right basal  ganglia/corona radiata region compatible with acute infarct. No  associated susceptibility hypointensity to suggest hemorrhagic  transformation. No significant mass effect or midline shift. No  evidence of acute intracranial hemorrhage. Moderate diffuse  parenchymal volume loss. Patchy periventricular white matter T2  hyperintensities are nonspecific, but likely related to chronic  microvascular ischemic disease. Ventricular size is within normal  limits without evidence of hydrocephalus. Presumed prominent  perivascular spaces in the basal ganglia bilaterally.    Foci of susceptibility hypodensity in the left thalamus and right  cerebellum probably representing chronic microhemorrhages.    There is no abnormal intracranial enhancement.     The facial structures appear normal.        Impression    IMPRESSION:    1. Acute/subacute infarct in the posterior right basal ganglia/corona  radiata region. No evidence of hemorrhagic transformation. No  significant mass effect or midline shift.   2. Moderate diffuse parenchymal volume loss and white matter changes  likely due to chronic microvascular ischemic disease.  3. Probable chronic microimages in the left thalamus and right  cerebellum.      ASHTYN ARCHER MD   XR Video Swallow w/o Esophagram w/SLP or OT    Narrative    VIDEO SWALLOWING EVALUATION   1/18/2020 9:44 AM     HISTORY: Assess pharyngeal phase.    COMPARISON: None.    FLUOROSCOPY TIME: 1.4 minutes     Number of cine runs: 12    FINDINGS:    Thin: Premature spill to the piriforms. Minimal flash penetration on  most swallows. Sawyer aspiration with spontaneous cough by straw only.  Otherwise normal.    Nectar: Premature spill to the piriforms. Mild flash penetration with  most swallows. Otherwise normal.    Honey: Premature spill to the vallecula. Otherwise normal.    Pudding: Premature spill to the vallecula. Otherwise normal.    Semisolid: Normal.    Solid: Normal.    This study only includes the cervical esophagus.    RUBY CARBONE MD   EP Device    Narrative    PROCEDURES PERFORMED:   1. Implantation of an implantable loop recorder (ILR)   2. Conscious sedation.     INDICATION: Cryptogenic CVA    HISTORY OF PRESENT ILLNESS: This is a 79 year old year-old patient with a   history of CVA suspicious for embolic source. Patient is referred for an   ILR.    METHOD: The patient was prepped and draped in the usual manner. . 1%   lidocaine was infiltrated into the area located below the mid left   clavicle. An incision was made with a special blade. The device was then   injected into the subcutaneous tissue. Steri-Strips and an OpSite dressing   were then placed over the incision. The patient was then transferred back   to the neurology floor in stable condition.     COMPLICATIONS: None.      YASMINE, [id number]. [id number]    CONCLUSION: Uneventful implantation of an implantable loop recorder     Evgeny Parisi MD     Echocardiogram Complete - Bubble study    Narrative    [id number]  [id number]  [id number]  [id number]^ZEINAB^TIFFANY^FAITH           [facility]  Echocardiography Laboratory  [address], [place] [address]        Name: SHERI THORPE  MRN: [id number]  : 1940  Study Date: 2020 01:40 PM  Age: 79 yrs  Gender: Male  Patient Location: [facility]  Reason For Study: CVA  Ordering Physician: TIFFANY ARRIOLA  Referring Physician: Augustin Thomas  Performed By: Monserrat Ken     BSA: 2.4 m2  Height: 72 in  Weight: 271 lb  HR: 95  BP: 115/65 mmHg  _____________________________________________________________________________  __        Procedure  Complete Portable Bubble Echo Adult. [employer] ([facility]C #[id number]) given  intravenously.  _____________________________________________________________________________  __        Interpretation Summary     Technically VERY difficult study despite contrast use. Study is inadequate for  accurate wall motion analysis.  The visual ejection fraction is estimated at 60-65%.  While bubble study is negative, it is very poor quality.  If there is a concern for cardiac source of CVA, alternative imaging such as  VICKEY could be considered if clinically appropriate/ indicated.  _____________________________________________________________________________  __        Left Ventricle  The left ventricle is normal in size. The visual ejection fraction is  estimated at 60-65%. Diastolic Doppler findings (E/E' ratio and/or other  parameters) suggest left ventricular filling pressures are indeterminate.     Right Ventricle  The right ventricle is mildly dilated. The right ventricular systolic function  is normal.     Atria  Normal left atrial size. Right atrial size is normal.     Mitral Valve  Evaluation of regurgitation is inadequate. There is trace to mild  mitral  regurgitation.        Tricuspid Valve  Right ventricular systolic pressure could not be approximated due to  inadequate tricuspid regurgitation. There is trace tricuspid regurgitation.     Aortic Valve  The aortic valve is not well visualized. No hemodynamically significant  valvular aortic stenosis.     Pulmonic Valve  The pulmonic valve is not well visualized.     Vessels  The aortic root is normal size. The aortic root is not well visualized.     Pericardium  There is no pericardial effusion.     _____________________________________________________________________________  __  MMode/2D Measurements & Calculations  IVSd: 1.0 cm  LVIDd: 4.1 cm  LVIDs: 2.5 cm  LVPWd: 1.2 cm  FS: 38.5 %  LV mass(C)d: 154.7 grams  LV mass(C)dI: 63.8 grams/m2     Ao root diam: 3.9 cm  asc Aorta Diam: 3.8 cm  LVOT diam: 2.0 cm  LVOT area: 3.2 cm2  RWT: 0.59        Doppler Measurements & Calculations  MV E max ten: 56.6 cm/sec  MV A max ten: 95.9 cm/sec  MV E/A: 0.59  MV dec time: 0.22 sec  LV V1 max P.6 mmHg  LV V1 max: 80.2 cm/sec  LV V1 VTI: 15.9 cm  SV(LVOT): 50.4 ml  SI(LVOT): 20.8 ml/m2  PA acc time: 0.11 sec  E/E' av.2  Lateral E/e': 8.1  Medial E/e': 10.4              _____________________________________________________________________________  __        Report approved by: Maynor Jasso 2020 03:01 PM            Discharge Medications   Current Discharge Medication List      START taking these medications    Details   atorvastatin (LIPITOR) 10 MG tablet Take 1 tablet (10 mg) by mouth every evening    Associated Diagnoses: Cerebrovascular accident (CVA), unspecified mechanism (H)      clopidogrel (PLAVIX) 75 MG tablet Take 1 tablet (75 mg) by mouth daily for 17 days  Qty: 17 tablet, Refills: 0    Associated Diagnoses: Left arm weakness; Cerebrovascular accident (CVA), unspecified mechanism (H)      furosemide (LASIX) 20 MG tablet Take 1 tablet (20 mg) by mouth daily for 3 days  Qty: 3 tablet, Refills: 0     Associated Diagnoses: Acute pulmonary edema (H)         CONTINUE these medications which have CHANGED    Details   !! aspirin (ASA) 325 MG EC tablet Take 1 tablet (325 mg) by mouth daily    Associated Diagnoses: Left arm weakness; Cerebrovascular accident (CVA), unspecified mechanism (H)      !! aspirin (ASA) 81 MG EC tablet Take 1 tablet (81 mg) by mouth daily for 17 days  Qty: 17 tablet, Refills: 0    Associated Diagnoses: Left arm weakness; Cerebrovascular accident (CVA), unspecified mechanism (H)      tamsulosin (FLOMAX) 0.4 MG capsule Take 1 capsule (0.4 mg) by mouth every morning  Qty: 14 capsule, Refills: 0    Associated Diagnoses: Cerebrovascular accident (CVA), unspecified mechanism (H)       !! - Potential duplicate medications found. Please discuss with provider.      CONTINUE these medications which have NOT CHANGED    Details   albuterol (2.5 MG/3ML) 0.083% neb solution Take 1 vial (2.5 mg) by nebulization every 2 hours as needed for shortness of breath / dyspnea or other (dyspnea)  Qty: 360 mL, Refills: 0    Associated Diagnoses: COPD exacerbation (H)      ALLOPURINOL PO Take 300 mg by mouth daily      Emollient (AMLACTIN ULTRA EX) Externally apply topically daily APPLY TO FEET       fluocinonide-emollient (LIDEX-E) 0.05 % cream Apply topically 2 times daily as needed      metoprolol tartrate (LOPRESSOR) 25 MG tablet Take 12.5 mg by mouth 2 times daily      PARoxetine HCl (PAXIL PO) Take 20 mg by mouth daily       order for DME Equipment being ordered: Other: Home nebulizer  Treatment Diagnosis: COPD/Pneumonia  Qty: 1 Device, Refills: 0    Associated Diagnoses: COPD exacerbation (H)           Allergies   No Known Allergies

## 2020-01-20 NOTE — PLAN OF CARE
Patient alert, oriented x4. VSS on 2L o2 via nasal cannula ex tachycardic 110's. Tele Sinus tach w/ BBB. Denies pain. Up w/ lift. Neuro's with left facial droop and tongue deviation, garbled speech, and 1/4 strength to LUE/LLE. Denies numbness or tingling. Lung sounds diminished, +1-2 generalized edema. Receiving IV Lasix, purewick in place. BM x2. Plan for discharge to [facility] with ziopatch when able.

## 2020-01-20 NOTE — PLAN OF CARE
AO X4. Speech garbled. Left facial droop. Left extremity weakness. VSS ex tachy at times, on 2L of O2. Clear liquid diet. Patient refused to eat due to poor appetite after procedure. NPO this AM for loop monitor procedure. Bleeding and called cath to come change dressing. Turn and repo Q2h, assist of 2 and mechanical lift. Pt C/O bladder discomfort and unable to urinate. Bladder scanned for 480. MD aware. Patient C/O bladder discomfort later in afternoon and chirinos was put in for output of 1400. Plan to discharge to [facility] at 1630 today with transportation on stretch. Will leave on O2 and oral lasix.

## 2020-01-20 NOTE — PROGRESS NOTES
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 Nonambulatory 2/2 back surgery and knee problems. Largely bedbound but able to transfer to power wheelchair, evening of presentation he noted L arm significantly weaker. Per family also garbled speech. Code CVA called in the ED. MRI with acute/ subacute infarct in the posterior R basal ganglia/ corona radiata region.  Echocardiogram very difficult study, unable to assess WMA. Bubble negative but poor quality. LDL 40/ HDL 30/ TG 83, troponin negative, A1C at 6.0%  Plan:  - neurology following and appreciate assistance  - plavix loaded and continue 75 mg daily x 3 weeks  - ASA 81 mg x 3 weeks then 325 mg daily  - q4 neuro checks  - telemetry  - OT/PT/SLP  - atorvastatin 10 mg daily   - EP consult requested by neurology for implantable loop recorder, was placed 01/20    Hypoxia in the setting of obstructive sleep apnea and possible fluid overload;  Patient is needing 3 L of oxygen by nasal cannula  He is not on oxygen prior to hospitalization   has been getting IV fluids since admission  Started on Lasix 40 mg IV twice daily for gentle diuresis with improvement in oxygenation  Plan:  Wean oxygen as tolerated  Start PO Lasix    BRAD  1/17 difficult to arouse but is arousable and able to answer questions. No obvious sedating medications given. Body habitus would be very consistent with BRAD.  ABG 7.36/59/66/34. with elevated bicarb appears to largely represent chronic sleep disordered breathing. Wife reports that he is supposed to use CPAP at night at home but refuses. Somnolence much improved overnight.  Plan:  - monitor for now     HTN  PTA on metoprolol 12.5 mg BID,  continued when okay with neurology. This will be resumed at increased dose of 25 mg BID on 01/20 due to significant tachycardia.     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 Afebrile with no leukocytosis. UC with candida tropicalis. Overall asymptomatic from urinary standpoint.   Plan:  - hold abx for now    BPH  PTA on tamsulosin 0.4 mg daily, continue    COPD  PTA on prn albuterol nebs, continue    FEN (fluids, electrolytes and nutrition): diet as per slp  Discussed with nursing.  DVT Prophylaxis: Pneumatic Compression Devices  Code Status: Full Code    Disposition: Expected discharge in 1-2 days  to TCU after diuresis    Sandro Maradiaga MD      Interval History     No issues with somnolence today, patient was appropriately answering questions . Denies cp/sob. No vision changes. No new weakness or numbness today. No nausea/vomiting. Oxygen needs declining. Had some urinary rentention and needed chirinos placed.     -Data reviewed today: I reviewed all new labs and imaging results over the last 24 hours. I personally reviewed no images or EKG's today.    Physical Exam   Temp: 97.8  F (36.6  C) Temp src: Oral BP: 127/77 Pulse: 130 Heart Rate: 102 Resp: 16 SpO2: 95 % O2 Device: Nasal cannula Oxygen Delivery: 3 LPM  Vitals:    01/15/20 1810 01/20/20 0626   Weight: 122.7 kg (270 lb 6.4 oz) 119.5 kg (263 lb 7.2 oz)     Vital Signs with Ranges  Temp:  [97.6  F (36.4  C)-99.4  F (37.4  C)] 97.8  F (36.6  C)  Pulse:  [110-130] 130  Heart Rate:  [102-115] 102  Resp:  [16-18] 16  BP: (104-132)/(77-91) 127/77  SpO2:  [92 %-95 %] 95 %  I/O last 3 completed shifts:  In: 0   Out: 2775 [Urine:2775]    Constitutional: Alert and awake with no acute distress  Respiratory: Bibasilar crackles heard on auscultation  Cardiovascular: Regular rate and rhythm, no murmurs  GI: Soft, obese, nontender  Skin/Integumen: No erythema, cyanosis or edema  Other: R  side with good strength. Appears to have normal strength in RLE. Left hemiparesis present.     Medications     - MEDICATION INSTRUCTIONS -         allopurinol  300 mg Oral Daily     aspirin  81 mg Oral Daily     atorvastatin  10 mg Oral QPM     clopidogrel  75 mg Oral Daily     metoprolol  5 mg Intravenous Q8H     metoprolol tartrate  25 mg Oral BID     PARoxetine  20 mg Oral Daily     sodium chloride (PF)  3 mL Intracatheter Q8H     tamsulosin  0.4 mg Oral QAM       Data   Recent Labs   Lab 01/20/20  0845 01/14/20  2356   WBC  --  8.7   HGB  --  13.4   MCV  --  93   PLT  --  184   INR  --  1.13    138   POTASSIUM 3.6 4.2   CHLORIDE 96 103   CO2 36* 33*   BUN 8 24   CR 0.71 0.86   ANIONGAP 2* 2*   RAJ 8.6 7.9*   * 103*   TROPI  --  <0.015       Recent Results (from the past 24 hour(s))   EP Device    Narrative    PROCEDURES PERFORMED:   1. Implantation of an implantable loop recorder (ILR)   2. Conscious sedation.     INDICATION: Cryptogenic CVA    HISTORY OF PRESENT ILLNESS: This is a 79 year old year-old patient with a   history of CVA suspicious for embolic source. Patient is referred for an   ILR.    METHOD: The patient was prepped and draped in the usual manner. . 1%   lidocaine was infiltrated into the area located below the mid left   clavicle. An incision was made with a special blade. The device was then   injected into the subcutaneous tissue. Steri-Strips and an OpSite dressing   were then placed over the incision. The patient was then transferred back   to the neurology floor in stable condition.     COMPLICATIONS: None.     YASMINE, LNQ11. [id number]    CONCLUSION: Uneventful implantation of an implantable loop recorder     Evgeny Parisi MD

## 2020-01-20 NOTE — PROGRESS NOTES
[facility] Progress Note  Chart Reviewed, Pt discussed in Interdisciplinary Rounds.   Pt cleared for discharge later today following loop recorder placement.    Intervention:   Pt will be on oral lasix at discharge but has been using O2 and is unable to self regulate.  JEROME updated that pt has a bed available at [facility] for anytime today.  JEROME contacted [facility] and scheduled a stretcher transport for pt at 4:30 today. Pt currently down having recorder inserted.  JEROME completed PCS and placed on pt chart for time of transport.  JEROME contacted wife, Yuli, to provide update of plan to discharge today at 4:30pm   ..PAS-RR    D: Per [facility] regulation, JEROME completed and submitted PAS-RR to [place] [employer] Direct Connect via the Senior LinkAge Line.   PAS-RR confirmation # is :[id number]  P: Further questions may be directed to [facility] LinkAge Line at #[phone], option #4 for PAS-RR staff.      Team Members notified:   JONATHAN,RN,HUC,BB      Plan: Discharge to [facility] via [facility] stretcher at 4:30pm.    TIFFANY Rodriguez  [facility]  [phone]

## 2020-01-21 ENCOUNTER — APPOINTMENT (OUTPATIENT)
Dept: GENERAL RADIOLOGY | Facility: CLINIC | Age: 80
DRG: 040 | End: 2020-01-21
Attending: STUDENT IN AN ORGANIZED HEALTH CARE EDUCATION/TRAINING PROGRAM
Payer: COMMERCIAL

## 2020-01-21 ENCOUNTER — APPOINTMENT (OUTPATIENT)
Dept: SPEECH THERAPY | Facility: CLINIC | Age: 80
DRG: 040 | End: 2020-01-21
Payer: COMMERCIAL

## 2020-01-21 ENCOUNTER — APPOINTMENT (OUTPATIENT)
Dept: OCCUPATIONAL THERAPY | Facility: CLINIC | Age: 80
DRG: 040 | End: 2020-01-21
Payer: COMMERCIAL

## 2020-01-21 DIAGNOSIS — I63.9 CEREBROVASCULAR ACCIDENT (CVA), UNSPECIFIED MECHANISM (H): Primary | ICD-10-CM

## 2020-01-21 LAB
ALBUMIN UR-MCNC: 300 MG/DL
ANION GAP SERPL CALCULATED.3IONS-SCNC: 1 MMOL/L (ref 3–14)
APPEARANCE UR: ABNORMAL
BILIRUB UR QL STRIP: ABNORMAL
BUN SERPL-MCNC: 16 MG/DL (ref 7–30)
CALCIUM SERPL-MCNC: 8.6 MG/DL (ref 8.5–10.1)
CHLORIDE SERPL-SCNC: 97 MMOL/L (ref 94–109)
CO2 SERPL-SCNC: 40 MMOL/L (ref 20–32)
COLOR UR AUTO: YELLOW
CREAT SERPL-MCNC: 0.76 MG/DL (ref 0.66–1.25)
ERYTHROCYTE [DISTWIDTH] IN BLOOD BY AUTOMATED COUNT: 15.3 % (ref 10–15)
GFR SERPL CREATININE-BSD FRML MDRD: 88 ML/MIN/{1.73_M2}
GLUCOSE SERPL-MCNC: 122 MG/DL (ref 70–99)
GLUCOSE UR STRIP-MCNC: NEGATIVE MG/DL
HCT VFR BLD AUTO: 44 % (ref 40–53)
HGB BLD-MCNC: 13.6 G/DL (ref 13.3–17.7)
HGB UR QL STRIP: ABNORMAL
KETONES UR STRIP-MCNC: 5 MG/DL
LEUKOCYTE ESTERASE UR QL STRIP: ABNORMAL
MCH RBC QN AUTO: 28.6 PG (ref 26.5–33)
MCHC RBC AUTO-ENTMCNC: 30.9 G/DL (ref 31.5–36.5)
MCV RBC AUTO: 93 FL (ref 78–100)
MUCOUS THREADS #/AREA URNS LPF: PRESENT /LPF
NITRATE UR QL: NEGATIVE
PH UR STRIP: 6 PH (ref 5–7)
PLATELET # BLD AUTO: 176 10E9/L (ref 150–450)
POTASSIUM SERPL-SCNC: 3.6 MMOL/L (ref 3.4–5.3)
RBC # BLD AUTO: 4.75 10E12/L (ref 4.4–5.9)
RBC #/AREA URNS AUTO: 148 /HPF (ref 0–2)
SODIUM SERPL-SCNC: 138 MMOL/L (ref 133–144)
SOURCE: ABNORMAL
SP GR UR STRIP: 1.03 (ref 1–1.03)
UROBILINOGEN UR STRIP-MCNC: 4 MG/DL (ref 0–2)
WBC # BLD AUTO: 11.6 10E9/L (ref 4–11)
WBC #/AREA URNS AUTO: >182 /HPF (ref 0–5)
WBC CLUMPS #/AREA URNS HPF: PRESENT /HPF
YEAST #/AREA URNS HPF: ABNORMAL /HPF

## 2020-01-21 PROCEDURE — 25000132 ZZH RX MED GY IP 250 OP 250 PS 637: Performed by: INTERNAL MEDICINE

## 2020-01-21 PROCEDURE — 99233 SBSQ HOSP IP/OBS HIGH 50: CPT | Performed by: STUDENT IN AN ORGANIZED HEALTH CARE EDUCATION/TRAINING PROGRAM

## 2020-01-21 PROCEDURE — 92526 ORAL FUNCTION THERAPY: CPT | Mod: GN

## 2020-01-21 PROCEDURE — 36415 COLL VENOUS BLD VENIPUNCTURE: CPT | Performed by: STUDENT IN AN ORGANIZED HEALTH CARE EDUCATION/TRAINING PROGRAM

## 2020-01-21 PROCEDURE — 25000128 H RX IP 250 OP 636: Performed by: STUDENT IN AN ORGANIZED HEALTH CARE EDUCATION/TRAINING PROGRAM

## 2020-01-21 PROCEDURE — 80048 BASIC METABOLIC PNL TOTAL CA: CPT | Performed by: STUDENT IN AN ORGANIZED HEALTH CARE EDUCATION/TRAINING PROGRAM

## 2020-01-21 PROCEDURE — 87106 FUNGI IDENTIFICATION YEAST: CPT | Performed by: HOSPITALIST

## 2020-01-21 PROCEDURE — 97535 SELF CARE MNGMENT TRAINING: CPT | Mod: GO

## 2020-01-21 PROCEDURE — 85027 COMPLETE CBC AUTOMATED: CPT | Performed by: STUDENT IN AN ORGANIZED HEALTH CARE EDUCATION/TRAINING PROGRAM

## 2020-01-21 PROCEDURE — 97112 NEUROMUSCULAR REEDUCATION: CPT | Mod: GO

## 2020-01-21 PROCEDURE — 87086 URINE CULTURE/COLONY COUNT: CPT | Performed by: HOSPITALIST

## 2020-01-21 PROCEDURE — 92507 TX SP LANG VOICE COMM INDIV: CPT | Mod: GN

## 2020-01-21 PROCEDURE — 25000132 ZZH RX MED GY IP 250 OP 250 PS 637: Performed by: STUDENT IN AN ORGANIZED HEALTH CARE EDUCATION/TRAINING PROGRAM

## 2020-01-21 PROCEDURE — 71046 X-RAY EXAM CHEST 2 VIEWS: CPT

## 2020-01-21 PROCEDURE — 25800030 ZZH RX IP 258 OP 636: Performed by: HOSPITALIST

## 2020-01-21 PROCEDURE — 81001 URINALYSIS AUTO W/SCOPE: CPT | Performed by: STUDENT IN AN ORGANIZED HEALTH CARE EDUCATION/TRAINING PROGRAM

## 2020-01-21 PROCEDURE — 12000000 ZZH R&B MED SURG/OB

## 2020-01-21 RX ORDER — PIPERACILLIN SODIUM, TAZOBACTAM SODIUM 3; .375 G/15ML; G/15ML
3.38 INJECTION, POWDER, LYOPHILIZED, FOR SOLUTION INTRAVENOUS EVERY 6 HOURS
Status: DISCONTINUED | OUTPATIENT
Start: 2020-01-21 | End: 2020-01-21

## 2020-01-21 RX ORDER — PIPERACILLIN SODIUM, TAZOBACTAM SODIUM 3; .375 G/15ML; G/15ML
3.38 INJECTION, POWDER, LYOPHILIZED, FOR SOLUTION INTRAVENOUS EVERY 6 HOURS
Status: DISCONTINUED | OUTPATIENT
Start: 2020-01-21 | End: 2020-01-22

## 2020-01-21 RX ADMIN — ASPIRIN 81 MG: 81 TABLET, DELAYED RELEASE ORAL at 09:40

## 2020-01-21 RX ADMIN — PIPERACILLIN AND TAZOBACTAM 3.38 G: 3; .375 INJECTION, POWDER, FOR SOLUTION INTRAVENOUS at 16:43

## 2020-01-21 RX ADMIN — ACETAMINOPHEN 650 MG: 325 TABLET, FILM COATED ORAL at 00:02

## 2020-01-21 RX ADMIN — TAMSULOSIN HYDROCHLORIDE 0.4 MG: 0.4 CAPSULE ORAL at 09:43

## 2020-01-21 RX ADMIN — ATORVASTATIN CALCIUM 10 MG: 10 TABLET, FILM COATED ORAL at 21:24

## 2020-01-21 RX ADMIN — ALLOPURINOL 300 MG: 300 TABLET ORAL at 09:43

## 2020-01-21 RX ADMIN — ACETAMINOPHEN 650 MG: 325 TABLET, FILM COATED ORAL at 16:43

## 2020-01-21 RX ADMIN — PIPERACILLIN AND TAZOBACTAM 3.38 G: 3; .375 INJECTION, POWDER, FOR SOLUTION INTRAVENOUS at 21:25

## 2020-01-21 RX ADMIN — CLOPIDOGREL BISULFATE 75 MG: 75 TABLET ORAL at 09:44

## 2020-01-21 RX ADMIN — METOPROLOL TARTRATE 25 MG: 25 TABLET ORAL at 09:44

## 2020-01-21 RX ADMIN — PAROXETINE HYDROCHLORIDE 20 MG: 20 TABLET, FILM COATED ORAL at 09:44

## 2020-01-21 RX ADMIN — SODIUM CHLORIDE 500 ML: 9 INJECTION, SOLUTION INTRAVENOUS at 22:15

## 2020-01-21 ASSESSMENT — ACTIVITIES OF DAILY LIVING (ADL)
ADLS_ACUITY_SCORE: 26

## 2020-01-21 NOTE — PLAN OF CARE
Discharge Planner OT   Patient plan for discharge: TCU    Current status: Pt complete hygiene task independently while seated in bed following set up. OT performed L UE PROM and provided neuro education during session. O2 sats dropped to 86% on room air during activity. Reapplied 3L.     Barriers to return to prior living situation: Current level of A required; Supplemental O2 needs    Recommendations for discharge: TCU    Rationale for recommendations: Pt in agreement with TCU plan. OT will continue with 5x/week POC to address L UE weakness and incoordination, decreased balance and activity tolerance, and impaired safety with ADL participation.        Entered by: Vickie Puetne 01/21/2020 9:54 AM

## 2020-01-21 NOTE — PROGRESS NOTES
"BRIEF NUTRITION ASSESSMENT      REASON FOR ASSESSMENT:  Ron Gilmore is a 79 year old male seen by Registered Dietitian for [facility]    NUTRITION HISTORY:  - Unable to obtain nutrition history, patient off floor at chest xray    CURRENT DIET AND INTAKE:  Diet: DD1 with Nectar thick liquids             Flow sheets indicate 100% meal consumption from 1/18-1/19. Unable to determine intake further. Will follow up again this week.     ANTHROPOMETRICS:  Height: 6' 0\"  Weight:  263 lbs 7.2 oz (119.5 kg)  Body mass index is 35.73 kg/m .   Weight Status: Obesity Grade II BMI 35-39.9  IBW:  77 kg  %IBW: 155%  Weight History: no weight trending from 2019 to evaluate  Wt Readings from Last 10 Encounters:   01/20/20 119.5 kg (263 lb 7.2 oz)   10/15/18 126.7 kg (279 lb 4.8 oz)   09/15/18 136.1 kg (300 lb)   03/14/18 136.1 kg (300 lb)   02/21/18 136.1 kg (300 lb)   10/13/17 (!) 137 kg (302 lb)   10/05/17 (!) 145.2 kg (320 lb)   06/26/17 (!) 137.6 kg (303 lb 4.8 oz)   06/23/17 136.1 kg (300 lb)   06/14/17 (!) 140 kg (308 lb 10.3 oz)       LABS:  Labs noted  Recent Labs   Lab 01/21/20  0751 01/20/20  0845 01/16/20  0906 01/16/20  0256 01/15/20  2136 01/14/20  2356   * 110*  --   --   --  103*   BGM  --   --  86 84 117*  --        MALNUTRITION:  Unable to determine if patient meets two of the following criteria necessary for diagnosing malnutrition: significant weight loss, reduced intake, subcutaneous fat loss, muscle loss or fluid retention.     NUTRITION INTERVENTION:  Nutrition Diagnosis:  Unable to determine at this time    Implementation:  Nutrition Education:  Per Provider order if indicated    FOLLOW UP/MONITORING:   Will re-evaluate in the next 1-2 days        Kaya Judge RD, LD  Clinical Dietitian     "

## 2020-01-21 NOTE — PROGRESS NOTES
CM    I: SW received an update patient's discharge is now anticipated in 1-2 days. SW left  for [facility]  admissions if they will have a bed for patient.    P: Continue to assist as needed.    TIFFANY Sprague   [facility]  [phone]    UPDATE@1034: [facility] stated they may potentially have a bed for patient tomorrow. SW to call [facility] admissions 1/22 w/pt discharge/pt update.

## 2020-01-21 NOTE — PROVIDER NOTIFICATION
Paged Hospitalist re: patient update.  urine output low, vitals recorded, urine sample sent, patient heading off floor to chest xray shortly. Will await any additional orders.

## 2020-01-21 NOTE — PROGRESS NOTES
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 Nonambulatory 2/2 back surgery and knee problems. Largely bedbound but able to transfer to power wheelchair, evening of presentation he noted L arm significantly weaker. Per family also garbled speech. Code CVA called in the ED. MRI with acute/ subacute infarct in the posterior R basal ganglia/ corona radiata region.  Echocardiogram very difficult study, unable to assess WMA. Bubble negative but poor quality. LDL 40/ HDL 30/ TG 83, troponin negative, A1C at 6.0%  Plan:  - neurology following and appreciate assistance  - plavix loaded and continue 75 mg daily x 3 weeks  - ASA 81 mg x 3 weeks then 325 mg daily  - q4 neuro checks  - telemetry  - OT/PT/SLP  - atorvastatin 10 mg daily   - EP consult by neurology for implantable loop recorder, was placed 01/20    Hypoxia in the setting of obstructive sleep apnea and possible fluid overload;  Patient is needing 3 L of oxygen by nasal cannula  Fever  He is not on oxygen prior to hospitalization   has been getting IV fluids since admission  Started on Lasix 40 mg IV twice daily for gentle diuresis with improvement in oxygenation. Now having low grade fever, WBC mildly elevated and tachycardic but non-toxic appearing overall.  Plan:  - Wean oxygen as tolerated  - CXR  - Check UA    ADDENDUM:   CXR shows  Lung volumes are low with new infiltrate or atelectasis in the left lower lobe. Likely aspiration pna vs HCAP. Started IV Zosyn, monitor closely. Aspiration precautions.    BRAD  1/17 difficult to arouse but is arousable and able to answer questions. No obvious sedating medications given. Body habitus would be very consistent with  BRAD.  ABG 7.36/59/66/34. with elevated bicarb appears to largely represent chronic sleep disordered breathing. Wife reports that he is supposed to use CPAP at night at home but refuses. Somnolence much improved overnight.  Plan:  - monitor for now     HTN  PTA on metoprolol 12.5 mg BID, continued when okay with neurology. This will be resumed at increased dose of 25 mg BID on 01/20 due to significant tachycardia.     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 Afebrile with no leukocytosis. UC with candida tropicalis. Overall asymptomatic from urinary standpoint.   Plan:  - hold abx for now    BPH  PTA on tamsulosin 0.4 mg daily, continue    COPD  PTA on prn albuterol nebs, continue    FEN (fluids, electrolytes and nutrition): diet as per slp  Discussed with nursing.  DVT Prophylaxis: Pneumatic Compression Devices  Code Status: Full Code    Disposition: Expected discharge in 1-2 days  to TCU once afebrile for 24 hours.     Sandro Maradiaga MD      Interval History     Tachcycardic prior to discharge yesterday, thus canceled. Patient was appropriately answering questions today, no somnolence, he overall feels well. . Denies cp/sob, no new cough. No vision changes. No new weakness or numbness today. No nausea/vomiting. Max temp 100.5F last evening. Still tachycardic this AM.    -Data reviewed today: I reviewed all new labs and imaging results over the last 24 hours. I personally reviewed no images or EKG's today.    Physical Exam   Temp: 97.2  F (36.2  C) Temp src: Axillary BP: 120/67 Pulse: 110 Heart Rate: 104 Resp: 16 SpO2: 95 % O2 Device: Nasal cannula Oxygen Delivery: 3 LPM  Vitals:    01/15/20 1810 01/20/20 0626   Weight: 122.7 kg (270 lb 6.4 oz) 119.5 kg (263 lb 7.2 oz)     Vital Signs with Ranges  Temp:  [97.2  F (36.2  C)-100.5  F (38.1  C)] 97.2  F (36.2  C)  Pulse:  [] 110  Heart Rate:  [] 104  Resp:  [16-20]  16  BP: ()/(45-84) 120/67  SpO2:  [91 %-95 %] 95 %  I/O last 3 completed shifts:  In: 800 [P.O.:300; IV Piggyback:500]  Out: 1575 [Urine:1575]    Constitutional: Alert and awake with no acute distress  Respiratory: Bibasilar crackles heard on auscultation  Cardiovascular: Regular rate and rhythm, no murmurs  GI: Soft, obese, nontender  Skin/Integumen: No erythema, cyanosis or edema  Other: R side with good strength. Appears to have normal strength in RLE. Left hemiparesis present.     Medications     - MEDICATION INSTRUCTIONS -         allopurinol  300 mg Oral Daily     aspirin  81 mg Oral Daily     atorvastatin  10 mg Oral QPM     clopidogrel  75 mg Oral Daily     metoprolol tartrate  25 mg Oral BID     PARoxetine  20 mg Oral Daily     sodium chloride (PF)  3 mL Intravenous Q8H     sodium chloride (PF)  3 mL Intracatheter Q8H     tamsulosin  0.4 mg Oral QAM       Data   Recent Labs   Lab 01/21/20  0751 01/20/20  0845 01/14/20  2356   WBC 11.6*  --  8.7   HGB 13.6  --  13.4   MCV 93  --  93     --  184   INR  --   --  1.13    134 138   POTASSIUM 3.6 3.6 4.2   CHLORIDE 97 96 103   CO2 40* 36* 33*   BUN 16 8 24   CR 0.76 0.71 0.86   ANIONGAP 1* 2* 2*   RAJ 8.6 8.6 7.9*   * 110* 103*   TROPI  --   --  <0.015       Recent Results (from the past 24 hour(s))   EP Device    Narrative    PROCEDURES PERFORMED:   1. Implantation of an implantable loop recorder (ILR)   2. Conscious sedation.     INDICATION: Cryptogenic CVA    HISTORY OF PRESENT ILLNESS: This is a 79 year old year-old patient with a   history of CVA suspicious for embolic source. Patient is referred for an   ILR.    METHOD: The patient was prepped and draped in the usual manner. . 1%   lidocaine was infiltrated into the area located below the mid left   clavicle. An incision was made with a special blade. The device was then   injected into the subcutaneous tissue. Steri-Strips and an OpSite dressing   were then placed over the  incision. The patient was then transferred back   to the neurology floor in stable condition.     COMPLICATIONS: None.     MDT, LNQ11. [id number]    CONCLUSION: Uneventful implantation of an implantable loop recorder     Evgeny Parisi MD

## 2020-01-21 NOTE — PLAN OF CARE
Discharge Planner SLP   Patient plan for discharge: home  Current status: Pt seen for both swallowing and communication tx during sessions.    Swallow: Noted fever, ongoing 02 needs, pending CxR today. Pt seen with nectar thick liquids, honey thick liquids, puree solids. More lethargic today and required increased cues for strategies. X3 strong coughs with nectar via both tsp and cup despite cues - ? Aspiration. With honey and puree no signs/sx noted. Recommendations: continue dysphagia diet level 1 with HONEY thick liquids via tsp or small single cup sip with cue to hold in oral cavity for 1-2 seconds prior to swallowing. Pt must be fully upright for PO, slow pacing, alternate between solids/liquids every 1-2 bites. Hold if any increased difficulty or too lethargic. 1:1 supervision/assist required given inconsistencies with strategies which are imperative to safety with PO/reduced aspiration risk.     Communication: Dysarthria more prominent currently given lethargy. Pt able to recall all dysarthria reduction strategies but required mod-max cues to implement them this date.     Barriers to return to prior living situation: dysphagia/aspiration risk, below baseline re: speech  Recommendations for discharge: TCU  Rationale for recommendations: ongoing SLP for both dysphagia/dysarthria as pt below baseline 2/2 acute CVA       Entered by: Carlyn Aguilar 01/21/2020 12:02 PM

## 2020-01-21 NOTE — PROGRESS NOTES
Loop Recorder Discharge  Dressing:  Minimal dried drainage present under bandage.  No swelling, fresh drainage or hematoma present.    Education Provided:  Do not shower until outer occlusive dressing has been removed.  Remove outer dressing in 3 - 4 days.  Leave steri-strips in place, these can be removed in 1 week.  Call Device Clinic with increased swelling, drainage, or fever > 101 degrees.   Follow-up with the Device Clinic as scheduled.     SOFIA Lakhani

## 2020-01-21 NOTE — DISCHARGE INSTRUCTIONS
Loop Reorder placed on left chest. Keep dry for 72 hours.   If site bleeds put pressure on site and redress.      Your risk factors for stroke or TIA (transient ischemic attack):    Your Risk Factors Your Results Normal Ranges   High blood pressure BP Readings from Last 1 Encounters:   01/20/20 127/77    Less than 120/80   Cholesterol              Total Lab Results   Component Value Date    CHOL 87 01/16/2020      Less than 150    Triglycerides   Lab Results   Component Value Date    TRIG 83 01/16/2020    Less than 150   LDL Lab Results   Component Value Date    LDL 40 01/16/2020       Less than 70   HDL Lab Results   Component Value Date    HDL 30 01/16/2020            Greater than 40 (men)  Greater than 50 (women)   Diabetes Recent Labs   Lab 01/20/20  0845   *    Fasting blood glucose    Smoking/tobacco use  Quit smoking and tobacco   Overweight  Lose 1-2 pounds a week   Lack of exercise  30 minutes moderate activity each day   Other risk factors include carotid (neck) artery disease, atrial fibrillation and stress. You may be on new medicine to treat high blood pressure, cholesterol, diabetes or atrial fibrillation.    Understanding Stroke Booklet given to patient. Please refer to booklet for further information.    Stroke warning signs and symptoms - CALL 911 right away for:  - Sudden numbness or weakness in the face, arm or leg (often on one side of the body).  - Sudden confusion or trouble understanding what is going on.  - Sudden blurred or decreased vision in one or both eyes.  - Sudden trouble speaking, loss of balance, dizziness or problems with coordination.  - Sudden, severe headache for no reason.  - Fainting or seizures.  - Symptoms may go away then come back suddenly      Discharge Instructions for Loop Recorder Implantation  You have had a procedure to insert a loop recorder. Once inside your body, this small electronic device will monitor your heart rhythm for abnormal rhythms. A loop  recorder can t fix existing heart problems. But it can give us more information to diagnose existing conditions. As you recover, follow all of the instructions you are given, including those below.  Precautions  - Follow your doctor's directions carefully for wound care. You may remove the outer dressing in 3 - 4 days (on Thursday night or Friday morning). Leave the steri-strips in place; these can be removed in 1 week, on 1/28/20. Never put any creams, lotions, or products like peroxide on an incision unless your doctor tells you to.   - You may shower once the outer dressing has been removed.   - Before you receive any treatment, tell all health care providers that you have a loop recorder.  Follow-Up  - Plug in your remote monitor within 5-10 feet of your bedside as soon as you get to the TCU/home.  Your remote monitor will notify the clinic if there are any irregular heart rhythms that occur.  - Follow up in the device clinic as scheduled. You have an appointment scheduled for March 3, 2020 at 1:00pm. Your appointment is at [facility] in [address], [place] [address]  - Make regular follow-up appointments with your device clinic. They will check the Loop Recorder to make sure it s working properly.  When to Call Your Device Clinic [phone]  Call your doctor if you have any of the following:  - Dizziness/Lightheadedness  - Fainting spells  - Rapid pulse or pounding heartbeat  - Pain around your loop recorder incision  - Fever above 100.4 F (38 C) or other signs of infection (redness, swelling, drainage, or warmth at the incision site)     [id number] [email or web address]. [address] [place] [id number]. All rights reserved. This information is not intended as a substitute for professional medical care. Always follow your healthcare professional's instructions.    Main Heart Clinic Number: [phone]  Device Clinic: [phone]  Appointment Line:  [phone]    You are discharging to:  [facility]  [address]  [address]  [phone]

## 2020-01-21 NOTE — PLAN OF CARE
Pt here with R CVA. A&O x4. Neuros with garbled speech, L droop. L sided weakness- can slightly grasp with L hand and can wiggle L toes on command. Loop monitor dressing on chest with scant amt dried drainage. Max temp 100.5, on 3-4L humidified O2. Held PM metoprolol for SBP in 90's. Tachy between 100-120 but does not sustain above 120. Tele SR/ST with BBB. On DD1 with nectar thick diet. Takes pills 1 at a time whole in applesauce. Cardona in place. Up with lift, T/R. Given tylenol for R shoulder pain. Pt scoring green on the Aggression Stop Light Tool. Discharge pending.

## 2020-01-21 NOTE — PLAN OF CARE
Acute/subacute infarct in the posterior right basal ganglia/corona. Neuros/CMS - left upper extremity contracted/weak grasp/unable to lift off bed; moving right upper spontaneously, denying any decreased sensation, toe wiggle on left foot, right foot drop noted (baseline since a back surgery 8 plus years ago). VSS, no prns given. Tele - sinus tach 90s-120s. Oxygen needs 2-3 liters nasal cannula with humidification.  DD1 diet with honey thickened liquids/takes pills whole in applesauce/set up & assist as needed.  Attempted to set up 2 meals today/meal got cold & then called to xray - patient had little intake.  Bedrest with 2 assist for turning/ceiling lift utilized to boost & get on cart for xray.  Cardona patent/dark allan urine/low urine output (md notified), brown smears of stool. Denies pain. Pt scoring green on the Aggression Stop Light Tool. Contact precautions maintained.

## 2020-01-22 ENCOUNTER — APPOINTMENT (OUTPATIENT)
Dept: OCCUPATIONAL THERAPY | Facility: CLINIC | Age: 80
DRG: 040 | End: 2020-01-22
Payer: COMMERCIAL

## 2020-01-22 ENCOUNTER — APPOINTMENT (OUTPATIENT)
Dept: SPEECH THERAPY | Facility: CLINIC | Age: 80
DRG: 040 | End: 2020-01-22
Payer: COMMERCIAL

## 2020-01-22 LAB
ANION GAP SERPL CALCULATED.3IONS-SCNC: 2 MMOL/L (ref 3–14)
BUN SERPL-MCNC: 24 MG/DL (ref 7–30)
CALCIUM SERPL-MCNC: 8.2 MG/DL (ref 8.5–10.1)
CHLORIDE SERPL-SCNC: 98 MMOL/L (ref 94–109)
CO2 SERPL-SCNC: 35 MMOL/L (ref 20–32)
CREAT SERPL-MCNC: 0.79 MG/DL (ref 0.66–1.25)
ERYTHROCYTE [DISTWIDTH] IN BLOOD BY AUTOMATED COUNT: 15.2 % (ref 10–15)
GFR SERPL CREATININE-BSD FRML MDRD: 86 ML/MIN/{1.73_M2}
GLUCOSE BLDC GLUCOMTR-MCNC: 100 MG/DL (ref 70–99)
GLUCOSE SERPL-MCNC: 107 MG/DL (ref 70–99)
HCT VFR BLD AUTO: 42.2 % (ref 40–53)
HGB BLD-MCNC: 12.4 G/DL (ref 13.3–17.7)
INTERPRETATION ECG - MUSE: NORMAL
MCH RBC QN AUTO: 27.7 PG (ref 26.5–33)
MCHC RBC AUTO-ENTMCNC: 29.4 G/DL (ref 31.5–36.5)
MCV RBC AUTO: 94 FL (ref 78–100)
PLATELET # BLD AUTO: 157 10E9/L (ref 150–450)
POTASSIUM SERPL-SCNC: 3.4 MMOL/L (ref 3.4–5.3)
RBC # BLD AUTO: 4.47 10E12/L (ref 4.4–5.9)
SODIUM SERPL-SCNC: 135 MMOL/L (ref 133–144)
WBC # BLD AUTO: 5.8 10E9/L (ref 4–11)

## 2020-01-22 PROCEDURE — 92507 TX SP LANG VOICE COMM INDIV: CPT | Mod: GN | Performed by: SPEECH-LANGUAGE PATHOLOGIST

## 2020-01-22 PROCEDURE — 12000000 ZZH R&B MED SURG/OB

## 2020-01-22 PROCEDURE — 00000146 ZZHCL STATISTIC GLUCOSE BY METER IP

## 2020-01-22 PROCEDURE — 97530 THERAPEUTIC ACTIVITIES: CPT | Mod: GO | Performed by: OCCUPATIONAL THERAPY ASSISTANT

## 2020-01-22 PROCEDURE — 97110 THERAPEUTIC EXERCISES: CPT | Mod: GO | Performed by: OCCUPATIONAL THERAPY ASSISTANT

## 2020-01-22 PROCEDURE — 80048 BASIC METABOLIC PNL TOTAL CA: CPT | Performed by: STUDENT IN AN ORGANIZED HEALTH CARE EDUCATION/TRAINING PROGRAM

## 2020-01-22 PROCEDURE — 99233 SBSQ HOSP IP/OBS HIGH 50: CPT | Performed by: INTERNAL MEDICINE

## 2020-01-22 PROCEDURE — 25000132 ZZH RX MED GY IP 250 OP 250 PS 637: Performed by: INTERNAL MEDICINE

## 2020-01-22 PROCEDURE — 25000128 H RX IP 250 OP 636: Performed by: HOSPITALIST

## 2020-01-22 PROCEDURE — 36415 COLL VENOUS BLD VENIPUNCTURE: CPT | Performed by: STUDENT IN AN ORGANIZED HEALTH CARE EDUCATION/TRAINING PROGRAM

## 2020-01-22 PROCEDURE — 92526 ORAL FUNCTION THERAPY: CPT | Mod: GN | Performed by: SPEECH-LANGUAGE PATHOLOGIST

## 2020-01-22 PROCEDURE — 25000128 H RX IP 250 OP 636: Performed by: STUDENT IN AN ORGANIZED HEALTH CARE EDUCATION/TRAINING PROGRAM

## 2020-01-22 PROCEDURE — 85027 COMPLETE CBC AUTOMATED: CPT | Performed by: STUDENT IN AN ORGANIZED HEALTH CARE EDUCATION/TRAINING PROGRAM

## 2020-01-22 PROCEDURE — 25000132 ZZH RX MED GY IP 250 OP 250 PS 637: Performed by: STUDENT IN AN ORGANIZED HEALTH CARE EDUCATION/TRAINING PROGRAM

## 2020-01-22 RX ORDER — PIPERACILLIN SODIUM, TAZOBACTAM SODIUM 4; .5 G/20ML; G/20ML
4.5 INJECTION, POWDER, LYOPHILIZED, FOR SOLUTION INTRAVENOUS EVERY 6 HOURS
Status: DISCONTINUED | OUTPATIENT
Start: 2020-01-22 | End: 2020-01-23 | Stop reason: HOSPADM

## 2020-01-22 RX ADMIN — TAMSULOSIN HYDROCHLORIDE 0.4 MG: 0.4 CAPSULE ORAL at 09:09

## 2020-01-22 RX ADMIN — METOPROLOL TARTRATE 25 MG: 25 TABLET ORAL at 09:09

## 2020-01-22 RX ADMIN — PIPERACILLIN AND TAZOBACTAM 3.38 G: 3; .375 INJECTION, POWDER, FOR SOLUTION INTRAVENOUS at 02:26

## 2020-01-22 RX ADMIN — PIPERACILLIN AND TAZOBACTAM 4.5 G: 4; .5 INJECTION, POWDER, FOR SOLUTION INTRAVENOUS at 15:24

## 2020-01-22 RX ADMIN — PAROXETINE HYDROCHLORIDE 20 MG: 20 TABLET, FILM COATED ORAL at 09:09

## 2020-01-22 RX ADMIN — METOPROLOL TARTRATE 25 MG: 25 TABLET ORAL at 21:05

## 2020-01-22 RX ADMIN — CLOPIDOGREL BISULFATE 75 MG: 75 TABLET ORAL at 09:08

## 2020-01-22 RX ADMIN — ALLOPURINOL 300 MG: 300 TABLET ORAL at 09:08

## 2020-01-22 RX ADMIN — PIPERACILLIN AND TAZOBACTAM 4.5 G: 4; .5 INJECTION, POWDER, FOR SOLUTION INTRAVENOUS at 21:05

## 2020-01-22 RX ADMIN — ATORVASTATIN CALCIUM 10 MG: 10 TABLET, FILM COATED ORAL at 21:05

## 2020-01-22 RX ADMIN — ASPIRIN 81 MG: 81 TABLET, DELAYED RELEASE ORAL at 09:08

## 2020-01-22 RX ADMIN — PIPERACILLIN AND TAZOBACTAM 4.5 G: 4; .5 INJECTION, POWDER, FOR SOLUTION INTRAVENOUS at 09:10

## 2020-01-22 ASSESSMENT — ACTIVITIES OF DAILY LIVING (ADL)
ADLS_ACUITY_SCORE: 26

## 2020-01-22 NOTE — PLAN OF CARE
Pt here with L sided weakness, CVA. Oriented x 3. Neuros intact besides; L sided weakness and garbled speech. Pt reported it has gotten better since being here but is still not at baseline. Speech is garbled; Pt reported that his speech has not improved . VSS. Tele SR w/ 1 degree AVB. Dd1 diet with honey thick liquids. Takes pills whole in apple sauce. Up with lift. incontinent of bowel and bladder. Pt has chirinos in place. + flatus, + BS, - BM. Denies pain. Pt scoring Green on the Aggression Stop Light Tool. Plan pending placement. Discharge to TCU once bed is available.

## 2020-01-22 NOTE — PLAN OF CARE
Vital signs stable on 2 L nasal cannula. Alert and oriented. Lung sounds diminished. Bowel sounds active, flatus present. Left chest dressing clean dry and intact. Patient denies pain. Up with lift and two. Tolerating thickened liquids. CMS/neuros intact except left sided deficits from previous strokes. Borderline chirinos output.

## 2020-01-22 NOTE — PROGRESS NOTES
"Stopped by to check in with pt this morning    Diet: DD1, HTL  Currently working with SLP and eating breakfast  Pt notes that he isn't really a breakfast eater  Typically is a good eater  Encouraged pt to eat protein foods - \"I would love a T-bone steak!\"  Pt states that last night he tolerated the potatoes and pudding well - \"coughed a bit with the turkey\"  He is agreeable to trying thickened supplements with meals - \"haven't had much of an appetite since I got here and I think I am leaving for a TCU in the next day\"    Vitals:    01/15/20 1810 01/20/20 0626   Weight: 122.7 kg (270 lb 6.4 oz) 119.5 kg (263 lb 7.2 oz)     Wt is down 7# (2%) past week    MALNUTRITION:  % Weight Loss:  Up to 1-2% in 1 week (non-severe malnutrition)  % Intake:  <75% for > 7 days (non-severe malnutrition)  Subcutaneous Fat Loss:  None observed  Muscle Loss:  None observed  Fluid Retention:  None noted    Malnutrition Diagnosis: Non-Severe malnutrition  In Context of:  Acute illness or injury      PLAN:  Will send a pineapple Gelatein PLUS and peach smoothie with lunch meal; chocolate magic cup and berry smoothie with dinner meal    Hillary Avila RD, LD  Clinical Dietitian - [facility]   Pager - [phone]                        "

## 2020-01-22 NOTE — PLAN OF CARE
Pt is VSS on 2LPM NC, BPs soft. C/o L shoulder pain, tylenol given and effective. Neuros: Pupils 1mm, reactive. L facial droop. LUE slight contraction, LLE no movement. +CMS. LS exp wheezes/crackles on LLL and TORRI. IS instruction given. AO2 T&R, up with lift. Encouraged fluids- increased PO intake on shift. Cardona patent, low urine output. See provider notification, 500cc NS bolus given. Pt is 1:1 honey thick liquid feed- needs reminders not to speak when drinking or eating. Received bed bath, shampoo.    Problem: Sensorimotor Impairment (Stroke, Hemorrhagic)  Goal: Improved Sensorimotor Function  1/21/2020 2355 by Blanca Mcneil RN  Outcome: No Change     Problem: Adjustment to Illness (Stroke, Hemorrhagic)  Goal: Optimal Coping  1/21/2020 2355 by Blanca Mcneil RN  Outcome: Improving     Problem: Bowel Elimination Impaired (Stroke, Hemorrhagic)  Goal: Effective Bowel Elimination  1/21/2020 2355 by Blanca Mcneil RN  Outcome: Improving     Problem: Cerebral Tissue Perfusion Risk (Stroke, Hemorrhagic)  Goal: Optimal Cerebral Tissue Perfusion  1/21/2020 2355 by Blanca Mcneil RN  Outcome: Improving     Problem: Eating/Swallowing Impairment (Stroke, Hemorrhagic)  Goal: Oral Intake without Aspiration  1/21/2020 2355 by Blanca Mcneil RN  Outcome: Improving     Problem: Functional Ability Impaired (Stroke, Hemorrhagic)  Goal: Optimal Functional Ability  1/21/2020 2355 by Blanca Mcneil RN  Outcome: Improving     Problem: Hemodynamic Instability (Stroke, Hemorrhagic)  Goal: Vital Signs Remain in Desired Range  1/21/2020 2355 by Blanca Mcneil RN  Outcome: Improving     Problem: Pain (Stroke, Hemorrhagic)  Goal: Acceptable Pain Control  1/21/2020 2355 by Blanca Mcneil RN  Outcome: Improving     Problem: Urinary Elimination Impaired (Stroke, Hemorrhagic)  Goal: Effective Urinary Elimination  1/21/2020 2355 by Blanca Mcneil RN  Outcome: Declining     Problem: Communication Impairment (Stroke, Hemorrhagic)  Goal:  Improved Communication Skills and Cognitive Function  1/21/2020 2355 by Blanca Mcneil, RN  Outcome: Completed

## 2020-01-22 NOTE — PROVIDER NOTIFICATION
Writer saw Dr. Hernandez in passing and asked if pt was covering hospital at the time.  Verbally spoke with Dr. Hernandez regarding pt's decreased urine output. Requested Dr. Hernandez to review pt chart and consider giving IV bolus.    500cc NS IV bolus ordered.

## 2020-01-22 NOTE — PLAN OF CARE
Discharge Planner SLP   Patient plan for discharge: Rehab  Current status: Swallow: One cough noted that pt reported was due to distraction/talking. Attempted honey thick liquids by cup, however, pt reported being too difficult to control in oral cavity. No difficulty with meds whole in puree. Pt acknowledged not being ready to advance diet and RD present to provide supplement options.     Recommendations: continue dysphagia diet level 1 with HONEY thick liquids via tsp or small single cup sip with cue to hold in oral cavity for 1-2 seconds prior to swallowing. Pt must be fully upright for PO, slow pacing, alternate between solids/liquids every 1-2 bites. Hold if any increased difficulty or too lethargic.     Speech: Improved speech intelligiblity with 100% in conversation. Pt recalled strategies and further reviewed dysarthria treatment and practice activities.      Barriers to return to prior living situation: Dysphagia; dysarthria  Recommendations for discharge: TCU  Rationale for recommendations: Continue ST daily for swallowing and speech goals       Entered by: Socorro May 01/22/2020 9:37 AM

## 2020-01-22 NOTE — PROGRESS NOTES
AINSLEY    I: SW received update from physician; anticipated pt's discharge to TCU tomorrow, 1/23. JEROME emailed [facility] admissions regarding bed availabililty.    P: Continue to assist as needed.      TIFFANY Sprague   [facility]  [phone]    UPDATE@4144: [facility] expects they will have a bed for patient tomorrow, per admissions. JEROME updated patient, who will require HE stretcher.

## 2020-01-22 NOTE — PROGRESS NOTES
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 Nonambulatory 2/2 back surgery and knee problems. Largely bedbound but able to transfer to power wheelchair, evening of presentation he noted L arm significantly weaker. Per family also garbled speech. Code CVA called in the ED. MRI with acute/ subacute infarct in the posterior R basal ganglia/ corona radiata region.  Echocardiogram very difficult study, unable to assess WMA. Bubble negative but poor quality. LDL 40/ HDL 30/ TG 83, troponin negative, A1C at 6.0%  - neurology following and appreciate assistance  - plavix loaded and continue 75 mg daily x 3 weeks  - ASA 81 mg x 3 weeks then 325 mg daily  - telemetry  - OT/PT/SLP  - atorvastatin 10 mg daily   -  implantable loop recorder, was placed 01/20, plan to discharge to TCU tomorrow     left lower lobe pneumonia  He is not on oxygen prior to hospitalization   -Continue Zosyn, speech evaluation noted, can transition to oral antibiotics tomorrow, afebrile for 24 hours, wean oxygen down as possible.  He did receive a few doses of IV Lasix.        BRAD  1/17 difficult to arouse but is arousable and able to answer questions. No obvious sedating medications given. Body habitus would be very consistent with BRAD.  ABG 7.36/59/66/34. with elevated bicarb appears to largely represent chronic sleep disordered breathing. Wife reports that he is supposed to use CPAP at night at home but refuses. Somnolence much improved overnight.      HTN  PTA on metoprolol 12.5 mg BID, continued when okay with neurology. This will be resumed at increased dose of 25 mg BID on 01/20 due to significant tachycardia.     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 Afebrile with no leukocytosis. UC with candida tropicalis. Overall asymptomatic from urinary standpoint.  His antibiotics were on hold, repeat UA has been obtained, culture pending.  He was started on Zosyn due to worry about aspiration pneumonia.      BPH  PTA on tamsulosin 0.4 mg daily, continue    COPD  PTA on prn albuterol nebs, continue    FEN (fluids, electrolytes and nutrition): diet as per slp  Discussed with nursing.  DVT Prophylaxis: Pneumatic Compression Devices  Code Status: Full Code    Disposition: Expected discharge  1/23 to TCU    Iris Mcmanus MD  Total time spend 35 min >50% spend on coordination of care including question with the nursing staff, patient, social work regarding discharge planning, continued antibiotic use, swallow issues.      Interval History     And afebrile for last 24 hours, he underwent swallow evaluation, did well, no nausea, eager to go to rehab, no new issues noted overnight, he is currently on Zosyn for antibiotics, his urine culture is pending, we discussed possible Cardona exchange with the nursing.    -Data reviewed today: I reviewed all new labs and imaging results over the last 24 hours. I personally reviewed no images or EKG's today.    Physical Exam   Temp: 98.3  F (36.8  C) Temp src: Oral BP: 111/67 Pulse: 65 Heart Rate: 68 Resp: 16 SpO2: 98 % O2 Device: Nasal cannula Oxygen Delivery: 1 LPM  Vitals:    01/15/20 1810 01/20/20 0626   Weight: 122.7 kg (270 lb 6.4 oz) 119.5 kg (263 lb 7.2 oz)     Vital Signs with Ranges  Temp:  [97.4  F (36.3  C)-98.9  F (37.2  C)] 98.3  F (36.8  C)  Pulse:  [65-80] 65  Heart Rate:  [68-97] 68  Resp:  [16] 16  BP: ()/(55-69) 111/67  SpO2:  [92 %-98 %] 98 %  I/O last 3 completed shifts:  In: 925 [P.O.:725; I.V.:200]  Out: 445 [Urine:445]    Constitutional: Alert and awake with no acute distress  Respiratory: Bibasilar crackles heard  on auscultation  Cardiovascular: Regular rate and rhythm, no murmurs  GI: Soft, obese, nontender  Skin/Integumen: No erythema, cyanosis or edema  Other: Left hemiparesis noted, left-sided facial paresis noted    Medications     - MEDICATION INSTRUCTIONS -         allopurinol  300 mg Oral Daily     aspirin  81 mg Oral Daily     atorvastatin  10 mg Oral QPM     clopidogrel  75 mg Oral Daily     metoprolol tartrate  25 mg Oral BID     PARoxetine  20 mg Oral Daily     piperacillin-tazobactam  4.5 g Intravenous Q6H     sodium chloride (PF)  3 mL Intravenous Q8H     sodium chloride (PF)  3 mL Intracatheter Q8H     tamsulosin  0.4 mg Oral QAM       Data   Recent Labs   Lab 01/22/20  0838 01/21/20  0751 01/20/20  0845   WBC 5.8 11.6*  --    HGB 12.4* 13.6  --    MCV 94 93  --     176  --     138 134   POTASSIUM 3.4 3.6 3.6   CHLORIDE 98 97 96   CO2 35* 40* 36*   BUN 24 16 8   CR 0.79 0.76 0.71   ANIONGAP 2* 1* 2*   RAJ 8.2* 8.6 8.6   * 122* 110*       No results found for this or any previous visit (from the past 24 hour(s)).

## 2020-01-22 NOTE — PLAN OF CARE
Discharge Planner OT   Patient plan for discharge: TCU     Current status:  pt completed supine to sit EOB with use of bed rail and max A, pt sat EOB with cues to right self back to midline and sitting balance SBA to mod A as pt leans to left. Completed LUE PROM, pt has trace movement in LUE.   Barriers to return to prior living situation: Current level of A required; Supplemental O2 needs     Recommendations for discharge: TCU per plan established by the Occupational Therapist     Rationale for recommendations: pt will benefit from TCU to maximize safe and independence with completing ADLS.        Entered by: Wendy Tripathi 01/22/2020 2:59 PM

## 2020-01-23 VITALS
HEIGHT: 72 IN | DIASTOLIC BLOOD PRESSURE: 62 MMHG | HEART RATE: 65 BPM | SYSTOLIC BLOOD PRESSURE: 111 MMHG | OXYGEN SATURATION: 93 % | WEIGHT: 272 LBS | TEMPERATURE: 97.9 F | RESPIRATION RATE: 16 BRPM | BODY MASS INDEX: 36.84 KG/M2

## 2020-01-23 LAB
BACTERIA SPEC CULT: ABNORMAL
GLUCOSE BLDC GLUCOMTR-MCNC: 92 MG/DL (ref 70–99)
Lab: ABNORMAL
SPECIMEN SOURCE: ABNORMAL

## 2020-01-23 PROCEDURE — 25000132 ZZH RX MED GY IP 250 OP 250 PS 637: Performed by: INTERNAL MEDICINE

## 2020-01-23 PROCEDURE — 25000128 H RX IP 250 OP 636: Performed by: HOSPITALIST

## 2020-01-23 PROCEDURE — 99232 SBSQ HOSP IP/OBS MODERATE 35: CPT | Performed by: INTERNAL MEDICINE

## 2020-01-23 PROCEDURE — 25800030 ZZH RX IP 258 OP 636: Performed by: HOSPITALIST

## 2020-01-23 PROCEDURE — 00000146 ZZHCL STATISTIC GLUCOSE BY METER IP

## 2020-01-23 PROCEDURE — 25000132 ZZH RX MED GY IP 250 OP 250 PS 637: Performed by: STUDENT IN AN ORGANIZED HEALTH CARE EDUCATION/TRAINING PROGRAM

## 2020-01-23 RX ORDER — METOPROLOL TARTRATE 25 MG/1
25 TABLET, FILM COATED ORAL 2 TIMES DAILY
Status: ON HOLD | DISCHARGE
Start: 2020-01-23 | End: 2021-03-22

## 2020-01-23 RX ADMIN — PIPERACILLIN AND TAZOBACTAM 4.5 G: 4; .5 INJECTION, POWDER, FOR SOLUTION INTRAVENOUS at 08:26

## 2020-01-23 RX ADMIN — PIPERACILLIN AND TAZOBACTAM 4.5 G: 4; .5 INJECTION, POWDER, FOR SOLUTION INTRAVENOUS at 14:03

## 2020-01-23 RX ADMIN — TAMSULOSIN HYDROCHLORIDE 0.4 MG: 0.4 CAPSULE ORAL at 08:32

## 2020-01-23 RX ADMIN — PAROXETINE HYDROCHLORIDE 20 MG: 20 TABLET, FILM COATED ORAL at 08:32

## 2020-01-23 RX ADMIN — ASPIRIN 81 MG: 81 TABLET, DELAYED RELEASE ORAL at 08:31

## 2020-01-23 RX ADMIN — PIPERACILLIN AND TAZOBACTAM 4.5 G: 4; .5 INJECTION, POWDER, FOR SOLUTION INTRAVENOUS at 02:23

## 2020-01-23 RX ADMIN — ALLOPURINOL 300 MG: 300 TABLET ORAL at 08:31

## 2020-01-23 RX ADMIN — SODIUM CHLORIDE 500 ML: 9 INJECTION, SOLUTION INTRAVENOUS at 00:15

## 2020-01-23 RX ADMIN — SENNOSIDES AND DOCUSATE SODIUM 2 TABLET: 8.6; 5 TABLET ORAL at 08:55

## 2020-01-23 RX ADMIN — METOPROLOL TARTRATE 25 MG: 25 TABLET ORAL at 08:32

## 2020-01-23 RX ADMIN — CLOPIDOGREL BISULFATE 75 MG: 75 TABLET ORAL at 08:31

## 2020-01-23 ASSESSMENT — ACTIVITIES OF DAILY LIVING (ADL)
ADLS_ACUITY_SCORE: 24
ADLS_ACUITY_SCORE: 24
ADLS_ACUITY_SCORE: 26
ADLS_ACUITY_SCORE: 24

## 2020-01-23 ASSESSMENT — MIFFLIN-ST. JEOR: SCORE: 1996.78

## 2020-01-23 NOTE — PLAN OF CARE
Here with stroke.  Neuros stable.  LUE/LLE hemiparesis 1/5.  Sensation intact.  Alert, orientated x 4.  Garbled speech but understandable.  Denies pain.  Senna to aid BM this am, refused suppository.  Poor appetite.  Up with lift.  Will discharge to TCU, [facility] at 1530.

## 2020-01-23 NOTE — PLAN OF CARE
A/O x4. L sided weakness, LUE >LLE. Garbled speech. Cardona with low urine output, MD notified. Tele SR w/ BBB. Weaned to 1 L nasal cannula. Up with lift. Tolerating DD1 honey-thick diet. Lungs dim. Denies pain. Plan for discharge to TCU tomorrow.

## 2020-01-23 NOTE — DISCHARGE SUMMARY
[facility]    Discharge Summary  Hospitalist    Date of Admission:  1/14/2020  Date of Discharge:  1/23/2020  Discharging Provider: Iris Mcmanus MD    Discharge Diagnoses   cva    History of Present Illness   Please refer to admission H&P    Hospital Course   Ron Gilmore was admitted on 1/14/2020.  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 Nonambulatory 2/2 back surgery and knee problems. Largely bedbound but able to transfer to power wheelchair, evening of presentation he noted L arm significantly weaker. Per family also garbled speech. Code CVA called in the ED. MRI with acute/ subacute infarct in the posterior R basal ganglia/ corona radiata region.  Echocardiogram very difficult study, unable to assess WMA. Bubble negative but poor quality. LDL 40/ HDL 30/ TG 83, troponin negative, A1C at 6.0%.  Patient was seen by neurology, he was loaded with Plavix 75 mg to continue for 3 weeks with aspirin 81 mg 3 weeks, stop both and then continue with aspirin 325 mg daily.  OT/PT/speech did follow the patient, plan is to discharge patient to TCU, and implantable loop recorder was placed on 1/20.        left lower lobe pneumonia  He is not on oxygen prior to hospitalization   Patient was started on Zosyn, later on a changed into p.o., we need to finish the course, speech evaluation was obtained which did adjust his diet.           BRAD  1/17 difficult to arouse but is arousable and able to answer questions. No obvious sedating medications given. Body habitus would be very consistent with BRAD.  ABG  7.36/59/66/34. with elevated bicarb appears to largely represent chronic sleep disordered breathing. Wife reports that he is supposed to use CPAP at night at home but refuses.        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 Afebrile with no leukocytosis. UC with candida tropicalis. Overall asymptomatic from urinary standpoint. .  Culture report does indicate Candida, since is less than 50,000 will possibly treated once Cardona is removed.        BPH  PTA on tamsulosin 0.4 mg daily, continue, patient had urinary retention while he was here, he has a Cardona catheter present, since we did not have the opportunity to voiding trials will discharge patient to TCU with catheter, please do voiding trials in 1 to 2 days.    COPD  PTA on prn albuterol nebs, continue      Iris Mcmanus MD    Significant Results and Procedures       Pending Results   Culture results noted  Unresulted Labs Ordered in the Past 30 Days of this Admission     Date and Time Order Name Status Description    1/21/2020 1220 Urine Culture Aerobic Bacterial Preliminary           Code Status   Full Code       Primary Care Physician   Augustin Thomas    Physical Exam   Temp: 97.9  F (36.6  C) Temp src: Oral BP: 111/62   Heart Rate: 58 Resp: 16 SpO2: 93 % O2 Device: Nasal cannula Oxygen Delivery: 1 LPM  Vitals:    01/15/20 1810 01/20/20 0626 01/23/20 0622   Weight: 122.7 kg (270 lb 6.4 oz) 119.5 kg (263 lb 7.2 oz) 123.4 kg (272 lb)     Vital Signs with Ranges  Temp:  [97.4  F (36.3  C)-98.5  F (36.9  C)] 97.9  F (36.6  C)  Heart Rate:  [53-83] 58  Resp:  [16] 16  BP: ()/(57-75) 111/62  SpO2:  [91 %-93 %] 93 %  I/O last 3 completed shifts:  In: 990 [P.O.:990]  Out: 640 [Urine:640]    The patient was examined on the day of discharge.    Discharge Disposition   Discharged to nursing home  Condition  at discharge: Stable    Consultations This Hospital Stay   NEUROLOGY IP CONSULT  PHYSICAL THERAPY ADULT IP CONSULT  OCCUPATIONAL THERAPY ADULT IP CONSULT  SPEECH LANGUAGE PATH ADULT IP CONSULT  SWALLOW EVAL SPEECH PATH AT BEDSIDE IP CONSULT  SMOKING CESSATION PROGRAM IP CONSULT  PATIENT LEARNING CENTER IP CONSULT  CARE TRANSITION RN/SW IP CONSULT  CARDIOLOGY IP CONSULT  ELECTROPHYSIOLOGY IP CONSULT  PHYSICAL THERAPY ADULT IP CONSULT  OCCUPATIONAL THERAPY ADULT IP CONSULT  SPEECH LANGUAGE PATH ADULT IP CONSULT    Time Spent on this Encounter   I, Iris Mcmanus MD, personally saw the patient today and spent greater than 30 minutes discharging this patient.    Discharge Orders      Discharge Instructions - Keep incision dry for 72 hours    Keep incision dry for 72 hours (unless Derma Darling was applied)     Discharge Instructions - Follow up with Device Check RN     - Follow up in the device clinic as scheduled. You have an appointment scheduled for March 3, 2020 at 1:00pm. Your appointment is at [facility] in [address], [place] [address]     General info for SNF    Length of Stay Estimate: Short Term Care: Estimated # of Days <30  Condition at Discharge: Stable  Level of care:skilled   Rehabilitation Potential: Fair  Admission H&P remains valid and up-to-date: Yes  Recent Chemotherapy: N/A  Use Nursing Home Standing Orders: Yes     Mantoux instructions    Give two-step Mantoux (PPD) Per Facility Policy Yes     Reason for your hospital stay    You had a stroke and needed neurology evaluation and therapies, discharged to rehab facility.     Intake and output    Every shift     Daily weights    Call Provider for weight gain of more than 2 pounds per day or 5 pounds per week.     Follow Up and recommended labs and tests    Follow up with [facility] physician.  The following labs/tests are recommended: None.    Please follow up with neurology in 4-6 weeks. You may call  the  following neurology clinic for an appointment or a clinic of your choice. Tell them you were recently hospitalized and need follow up as recommended at discharge.    [facility]  [address], [address]  [place] [address]  [phone]     Activity - Up with assistive device     Cardona catheter    To straight gravity drainage. Change catheter every 2 weeks and PRN for leaking or decreased uring output with signs of bladder distention. DO NOT change catheter without a specific MD order IF diagnosis of benign prostatic hypertrophy (BPH), neurogenic bladder, or other urological conditions     Physical Therapy Adult Consult    Evaluate and treat as clinically indicated.    Reason:  cva     Occupational Therapy Adult Consult    Evaluate and treat as clinically indicated.    Reason:  CVA     Speech Language Path Adult Consult    Evaluate and treat as clinically indicated.    Reason:  CVA - dysphagia     Oxygen - Nasal cannula    2 Lpm by nasal cannula to keep O2 sats 92% or greater.     Fall precautions     Advance Diet as Tolerated    Follow this diet upon discharge: Orders Placed This Encounter      Room Service      Advance Diet as Tolerated: dysphagia diet level 1 with nectar thick liquids via tsp only     Discharge Medications   Current Discharge Medication List      START taking these medications    Details   amoxicillin-clavulanate (AUGMENTIN) 875-125 MG tablet Take 1 tablet by mouth 2 times daily for 7 days    Associated Diagnoses: Community acquired pneumonia, unspecified laterality      atorvastatin (LIPITOR) 10 MG tablet Take 1 tablet (10 mg) by mouth every evening    Associated Diagnoses: Cerebrovascular accident (CVA), unspecified mechanism (H)      benzocaine-menthol (CHLORASEPTIC) 6-10 MG lozenge Place 1 lozenge inside cheek every hour as needed for sore throat (dry/sore throat without fever)    Associated Diagnoses: Community acquired pneumonia, unspecified laterality      clopidogrel  (PLAVIX) 75 MG tablet Take 1 tablet (75 mg) by mouth daily for 17 days  Qty: 17 tablet, Refills: 0    Associated Diagnoses: Left arm weakness; Cerebrovascular accident (CVA), unspecified mechanism (H)      furosemide (LASIX) 20 MG tablet Take 1 tablet (20 mg) by mouth daily for 3 days  Qty: 3 tablet, Refills: 0    Associated Diagnoses: Acute pulmonary edema (H)         CONTINUE these medications which have CHANGED    Details   !! aspirin (ASA) 325 MG EC tablet Take 1 tablet (325 mg) by mouth daily    Associated Diagnoses: Left arm weakness; Cerebrovascular accident (CVA), unspecified mechanism (H)      !! aspirin (ASA) 81 MG EC tablet Take 1 tablet (81 mg) by mouth daily for 17 days  Qty: 17 tablet, Refills: 0    Associated Diagnoses: Left arm weakness; Cerebrovascular accident (CVA), unspecified mechanism (H)      metoprolol tartrate (LOPRESSOR) 25 MG tablet Take 1 tablet (25 mg) by mouth 2 times daily    Associated Diagnoses: Cerebrovascular accident (CVA), unspecified mechanism (H)      tamsulosin (FLOMAX) 0.4 MG capsule Take 1 capsule (0.4 mg) by mouth every morning  Qty: 14 capsule, Refills: 0    Associated Diagnoses: Cerebrovascular accident (CVA), unspecified mechanism (H)       !! - Potential duplicate medications found. Please discuss with provider.      CONTINUE these medications which have NOT CHANGED    Details   albuterol (2.5 MG/3ML) 0.083% neb solution Take 1 vial (2.5 mg) by nebulization every 2 hours as needed for shortness of breath / dyspnea or other (dyspnea)  Qty: 360 mL, Refills: 0    Associated Diagnoses: COPD exacerbation (H)      ALLOPURINOL PO Take 300 mg by mouth daily      Emollient (AMLACTIN ULTRA EX) Externally apply topically daily APPLY TO FEET       fluocinonide-emollient (LIDEX-E) 0.05 % cream Apply topically 2 times daily as needed      PARoxetine HCl (PAXIL PO) Take 20 mg by mouth daily       order for DME Equipment being ordered: Other: Home nebulizer  Treatment Diagnosis:  COPD/Pneumonia  Qty: 1 Device, Refills: 0    Associated Diagnoses: COPD exacerbation (H)           Allergies   No Known Allergies  Data   Most Recent 3 CBC's:  Recent Labs   Lab Test 01/22/20  0838 01/21/20  0751 01/14/20  2356   WBC 5.8 11.6* 8.7   HGB 12.4* 13.6 13.4   MCV 94 93 93    176 184      Most Recent 3 BMP's:  Recent Labs   Lab Test 01/22/20  0838 01/21/20  0751 01/20/20  0845    138 134   POTASSIUM 3.4 3.6 3.6   CHLORIDE 98 97 96   CO2 35* 40* 36*   BUN 24 16 8   CR 0.79 0.76 0.71   ANIONGAP 2* 1* 2*   RAJ 8.2* 8.6 8.6   * 122* 110*     Most Recent 2 LFT's:  Recent Labs   Lab Test 10/12/18  2032 10/04/17  1400   AST 22 11   ALT 34 19   ALKPHOS 57 83   BILITOTAL 0.5 0.5     Most Recent INR's and Anticoagulation Dosing History:  Anticoagulation Dose History     Recent Dosing and Labs Latest Ref Rng & Units 7/13/2006 1/14/2020    INR 0.86 - 1.14 1.01 1.13        Most Recent 3 Troponin's:  Recent Labs   Lab Test 01/14/20  2356 10/12/18  2032   TROPI <0.015 <0.015     Most Recent Cholesterol Panel:  Recent Labs   Lab Test 01/16/20  0735   CHOL 87   LDL 40   HDL 30*   TRIG 83     Most Recent 6 Bacteria Isolates From Any Culture (See EPIC Reports for Culture Details):  Recent Labs   Lab Test 01/21/20  1220 01/16/20  1800 06/27/19  2335 10/12/18  2203 10/12/18  2120 10/12/18  2057   CULT 50,000 to 100,000 colonies/mL  Yeast  * 10,000 to 50,000 colonies/mL  mixed urogenital patty  Susceptibility testing not routinely done   50,000 to 100,000 colonies/mL  Candida tropicalis  *  Susceptibility testing not routinely done >100,000 colonies/mL  Escherichia coli  *  <10,000 colonies/mL  urogenital patty  Susceptibility testing not routinely done   Cultured on the 1st day of incubation:  Staphylococcus capitis  *  Critical Value/Significant Value, preliminary result only, called to and read back by  Jennifer Blanchard RN on 10/14/18 at 1008 ac.    (Note)  POSITIVE for Staphylococci other than S.aureus,  S.epidermidis and  S.lugdunensis, by Verigene multiplex nucleic acid test.  Coagulase-negative staphylococci are the most common venipuncture or  collection associated skin CONTAMINANTS grown in blood cultures.  Final identification and antimicrobial susceptibility testing will be  verified by standard methods.    Specimen tested with Verigene multiplex, gram-positive blood culture  nucleic acid test for the following targets: Staph aureus, Staph  epidermidis, Staph lugdunensis, other Staph species, Enterococcus  faecalis, Enterococcus faecium, Streptococcus species, S. agalactiae,  S. anginosus grp., S. pneumoniae, S. pyogenes, Listeria sp., mecA  (methicillin resistance) and Roosevelt/B (vancomycin resistance).    Critical Value/Significant Value called to and read back by Jennifer Blanchard RN ([id number]) on 10.14.18 @13:11PM by TOMEKA.             No growth     Most Recent TSH, T4 and A1c Labs:  Recent Labs   Lab Test 01/14/20  2356   A1C 6.0*     Results for orders placed or performed during the hospital encounter of 01/14/20   CT Head w/o Contrast    Narrative    EXAM: CT HEAD W/O CONTRAST, CTA  HEAD NECK WITH CONTRAST, CT HEAD PERFUSION WITH CONTRAST EXAM: CT HEAD W/O CONTRAST, CTA  HEAD NECK WITH CONTRAST, CT HEAD PERFUSION WITH CONTRAST  LOCATION: [facility]  DATE/TIME: 1/15/2020 12:21 AM    INDICATION: Left-sided weakness and slurred speech.  COMPARISON: None.  TECHNIQUE: Head and neck CT angiogram with IV contrast. Noncontrast head CT followed by axial helical CT images of the head and neck vessels obtained during the arterial phase of intravenous contrast administration. Axial 2D reconstructed images and   multiplanar 3D MIP reconstructed images of the head and neck vessels were performed by the technologist. Additional CT cerebral perfusion was performed utilizing a second contrast bolus. Perfusion data were post processed with generation of standard   perfusion maps and estimation of ischemic/infarcted  volumes utilizing standard threshold values. Dose reduction techniques were used.  All stenosis measurements made according to NASCET criteria unless otherwise specified.  CONTRAST: 70mL Isovue-370 (accession [id number]), 50mL Isovue-370 (accession [id number]), 70mL Isovue-370 (accession [id number])    FINDINGS:   NONCONTRAST HEAD CT:   INTRACRANIAL CONTENTS: No intracranial hemorrhage, extraaxial collection, or mass effect.  No CT evidence of acute infarct. There is diffuse scattered low-attenuation within the periventricular and subcortical white matter consistent with diffuse small   vessel ischemic disease. There are also probable multiple old lacunar infarcts involving the basal ganglia bilaterally and the right side of the julio césar. Mild to moderate generalized volume loss. No hydrocephalus. Ventricular system, basal cisterns and the   cortical sulci are consistent with mild volume loss.     VISUALIZED ORBITS/SINUSES/MASTOIDS: No intraorbital abnormality. No paranasal sinus mucosal disease. No middle ear or mastoid effusion.    BONES/SOFT TISSUES: No acute abnormality.    HEAD CTA:  ANTERIOR CIRCULATION: No stenosis/occlusion, aneurysm, or high flow vascular malformation. There is a patent anterior communicating artery.    POSTERIOR CIRCULATION: No stenosis/occlusion, aneurysm, or high flow vascular malformation. Balanced vertebral arteries supply a normal basilar artery.     DURAL VENOUS SINUSES: Expected enhancement of the major dural venous sinuses.    NECK CTA:  RIGHT CAROTID: Atherosclerotic plaque results in less than 50% stenosis in the right ICA. No dissection.    LEFT CAROTID: No measurable stenosis or dissection.    VERTEBRAL ARTERIES: No focal stenosis or dissection. Balanced vertebral arteries.    AORTIC ARCH: There is a bovine arch configuration of the great vessels off the aortic arch with no significant stenosis of their origins.    NONVASCULAR STRUCTURES: There is airspace disease involving the right  lung apex. There are diffuse degenerative changes of the cervical spine visualized.    CT PERFUSION:  PERFUSION MAPS: Relatively symmetrical cerebral perfusion. No focal deficits in cerebral blood flow or volume to suggest ischemia/oligemia.    RAPID ANALYSIS:  CBF<30%: 0 ml.  Tmax>6sec: 3 ml.  Mismatch volume: 3 ml.  Mismatch ratio: infinite.        Impression    IMPRESSION:   HEAD CT:  1.  No CT finding of a mass, hemorrhage or focal area suggestive of acute infarct.  2.  Diffuse age related changes along with old lacunar infarcts basal ganglia bilaterally and right side of the julio césar.    HEAD CTA:   1.  No discrete aneurysm, vascular malformation, vessel occlusion or significant stenosis involving arteries of the [place].    NECK CTA:  1.  Bovine arch configuration of great vessels off aortic arch with no significant stenosis of their origins.  2.  No significant stenosis or irregularity involving the arteries of the neck by NASCET criteria.  3. No radiographic evidence of dissection.    CT PERFUSION:  1.  Relatively symmetric cerebral perfusion.      Head CT findings were communicated to Dr. Helm at 12:15 AM. CTA results were communicated to Dr. Helm at 12:45 AM on 01/15/2020.   CTA Head Neck with Contrast    Narrative    EXAM: CT HEAD W/O CONTRAST, CTA  HEAD NECK WITH CONTRAST, CT HEAD PERFUSION WITH CONTRAST EXAM: CT HEAD W/O CONTRAST, CTA  HEAD NECK WITH CONTRAST, CT HEAD PERFUSION WITH CONTRAST  LOCATION: [facility]  DATE/TIME: 1/15/2020 12:21 AM    INDICATION: Left-sided weakness and slurred speech.  COMPARISON: None.  TECHNIQUE: Head and neck CT angiogram with IV contrast. Noncontrast head CT followed by axial helical CT images of the head and neck vessels obtained during the arterial phase of intravenous contrast administration. Axial 2D reconstructed images and   multiplanar 3D MIP reconstructed images of the head and neck vessels were performed by the technologist.  Additional CT cerebral perfusion was performed utilizing a second contrast bolus. Perfusion data were post processed with generation of standard   perfusion maps and estimation of ischemic/infarcted volumes utilizing standard threshold values. Dose reduction techniques were used.  All stenosis measurements made according to NASCET criteria unless otherwise specified.  CONTRAST: 70mL Isovue-370 (accession [id number]), 50mL Isovue-370 (accession [id number]), 70mL Isovue-370 (accession [id number])    FINDINGS:   NONCONTRAST HEAD CT:   INTRACRANIAL CONTENTS: No intracranial hemorrhage, extraaxial collection, or mass effect.  No CT evidence of acute infarct. There is diffuse scattered low-attenuation within the periventricular and subcortical white matter consistent with diffuse small   vessel ischemic disease. There are also probable multiple old lacunar infarcts involving the basal ganglia bilaterally and the right side of the julio césar. Mild to moderate generalized volume loss. No hydrocephalus. Ventricular system, basal cisterns and the   cortical sulci are consistent with mild volume loss.     VISUALIZED ORBITS/SINUSES/MASTOIDS: No intraorbital abnormality. No paranasal sinus mucosal disease. No middle ear or mastoid effusion.    BONES/SOFT TISSUES: No acute abnormality.    HEAD CTA:  ANTERIOR CIRCULATION: No stenosis/occlusion, aneurysm, or high flow vascular malformation. There is a patent anterior communicating artery.    POSTERIOR CIRCULATION: No stenosis/occlusion, aneurysm, or high flow vascular malformation. Balanced vertebral arteries supply a normal basilar artery.     DURAL VENOUS SINUSES: Expected enhancement of the major dural venous sinuses.    NECK CTA:  RIGHT CAROTID: Atherosclerotic plaque results in less than 50% stenosis in the right ICA. No dissection.    LEFT CAROTID: No measurable stenosis or dissection.    VERTEBRAL ARTERIES: No focal stenosis or dissection. Balanced vertebral arteries.    AORTIC ARCH:  There is a bovine arch configuration of the great vessels off the aortic arch with no significant stenosis of their origins.    NONVASCULAR STRUCTURES: There is airspace disease involving the right lung apex. There are diffuse degenerative changes of the cervical spine visualized.    CT PERFUSION:  PERFUSION MAPS: Relatively symmetrical cerebral perfusion. No focal deficits in cerebral blood flow or volume to suggest ischemia/oligemia.    RAPID ANALYSIS:  CBF<30%: 0 ml.  Tmax>6sec: 3 ml.  Mismatch volume: 3 ml.  Mismatch ratio: infinite.        Impression    IMPRESSION:   HEAD CT:  1.  No CT finding of a mass, hemorrhage or focal area suggestive of acute infarct.  2.  Diffuse age related changes along with old lacunar infarcts basal ganglia bilaterally and right side of the julio césar.    HEAD CTA:   1.  No discrete aneurysm, vascular malformation, vessel occlusion or significant stenosis involving arteries of the [place].    NECK CTA:  1.  Bovine arch configuration of great vessels off aortic arch with no significant stenosis of their origins.  2.  No significant stenosis or irregularity involving the arteries of the neck by NASCET criteria.  3. No radiographic evidence of dissection.    CT PERFUSION:  1.  Relatively symmetric cerebral perfusion.      Head CT findings were communicated to Dr. Helm at 12:15 AM. CTA results were communicated to Dr. Helm at 12:45 AM on 01/15/2020.   CT Head Perfusion w Contrast    Narrative    EXAM: CT HEAD W/O CONTRAST, CTA  HEAD NECK WITH CONTRAST, CT HEAD PERFUSION WITH CONTRAST EXAM: CT HEAD W/O CONTRAST, CTA  HEAD NECK WITH CONTRAST, CT HEAD PERFUSION WITH CONTRAST  LOCATION: [facility]  DATE/TIME: 1/15/2020 12:21 AM    INDICATION: Left-sided weakness and slurred speech.  COMPARISON: None.  TECHNIQUE: Head and neck CT angiogram with IV contrast. Noncontrast head CT followed by axial helical CT images of the head and neck vessels obtained during the  arterial phase of intravenous contrast administration. Axial 2D reconstructed images and   multiplanar 3D MIP reconstructed images of the head and neck vessels were performed by the technologist. Additional CT cerebral perfusion was performed utilizing a second contrast bolus. Perfusion data were post processed with generation of standard   perfusion maps and estimation of ischemic/infarcted volumes utilizing standard threshold values. Dose reduction techniques were used.  All stenosis measurements made according to NASCET criteria unless otherwise specified.  CONTRAST: 70mL Isovue-370 (accession [id number]), 50mL Isovue-370 (accession [id number]), 70mL Isovue-370 (accession [id number])    FINDINGS:   NONCONTRAST HEAD CT:   INTRACRANIAL CONTENTS: No intracranial hemorrhage, extraaxial collection, or mass effect.  No CT evidence of acute infarct. There is diffuse scattered low-attenuation within the periventricular and subcortical white matter consistent with diffuse small   vessel ischemic disease. There are also probable multiple old lacunar infarcts involving the basal ganglia bilaterally and the right side of the julio césar. Mild to moderate generalized volume loss. No hydrocephalus. Ventricular system, basal cisterns and the   cortical sulci are consistent with mild volume loss.     VISUALIZED ORBITS/SINUSES/MASTOIDS: No intraorbital abnormality. No paranasal sinus mucosal disease. No middle ear or mastoid effusion.    BONES/SOFT TISSUES: No acute abnormality.    HEAD CTA:  ANTERIOR CIRCULATION: No stenosis/occlusion, aneurysm, or high flow vascular malformation. There is a patent anterior communicating artery.    POSTERIOR CIRCULATION: No stenosis/occlusion, aneurysm, or high flow vascular malformation. Balanced vertebral arteries supply a normal basilar artery.     DURAL VENOUS SINUSES: Expected enhancement of the major dural venous sinuses.    NECK CTA:  RIGHT CAROTID: Atherosclerotic plaque results in less than 50%  stenosis in the right ICA. No dissection.    LEFT CAROTID: No measurable stenosis or dissection.    VERTEBRAL ARTERIES: No focal stenosis or dissection. Balanced vertebral arteries.    AORTIC ARCH: There is a bovine arch configuration of the great vessels off the aortic arch with no significant stenosis of their origins.    NONVASCULAR STRUCTURES: There is airspace disease involving the right lung apex. There are diffuse degenerative changes of the cervical spine visualized.    CT PERFUSION:  PERFUSION MAPS: Relatively symmetrical cerebral perfusion. No focal deficits in cerebral blood flow or volume to suggest ischemia/oligemia.    RAPID ANALYSIS:  CBF<30%: 0 ml.  Tmax>6sec: 3 ml.  Mismatch volume: 3 ml.  Mismatch ratio: infinite.        Impression    IMPRESSION:   HEAD CT:  1.  No CT finding of a mass, hemorrhage or focal area suggestive of acute infarct.  2.  Diffuse age related changes along with old lacunar infarcts basal ganglia bilaterally and right side of the julio césar.    HEAD CTA:   1.  No discrete aneurysm, vascular malformation, vessel occlusion or significant stenosis involving arteries of the [place].    NECK CTA:  1.  Bovine arch configuration of great vessels off aortic arch with no significant stenosis of their origins.  2.  No significant stenosis or irregularity involving the arteries of the neck by NASCET criteria.  3. No radiographic evidence of dissection.    CT PERFUSION:  1.  Relatively symmetric cerebral perfusion.      Head CT findings were communicated to Dr. Helm at 12:15 AM. CTA results were communicated to Dr. Helm at 12:45 AM on 01/15/2020.   XR Chest Port 1 View    Narrative    EXAM: XR CHEST PORT 1 VW  LOCATION: [facility]  DATE/TIME: 1/15/2020 12:31 AM    INDICATION: Code stroke.    COMPARISON: None.    FINDINGS: Upright portable chest. The heart size is normal. The thoracic aorta is tortuous. The lungs are clear. No pneumothorax.      Impression     IMPRESSION: No acute abnormality.   MRI Brain w & w/o contrast    Narrative    MRI BRAIN WITHOUT AND WITH CONTRAST  1/15/2020 9:22 PM     HISTORY:  Stroke, follow up.     TECHNIQUE:  Multiplanar, multisequence MRI of the brain without and  with 12 mL Gadavist.     COMPARISON: Head CT 1/14/2020. Brain MR 7/13/2006.     FINDINGS: Images are degraded by motion artifact.    Area of restricted diffusion in the posterior right basal  ganglia/corona radiata region compatible with acute infarct. No  associated susceptibility hypointensity to suggest hemorrhagic  transformation. No significant mass effect or midline shift. No  evidence of acute intracranial hemorrhage. Moderate diffuse  parenchymal volume loss. Patchy periventricular white matter T2  hyperintensities are nonspecific, but likely related to chronic  microvascular ischemic disease. Ventricular size is within normal  limits without evidence of hydrocephalus. Presumed prominent  perivascular spaces in the basal ganglia bilaterally.    Foci of susceptibility hypodensity in the left thalamus and right  cerebellum probably representing chronic microhemorrhages.    There is no abnormal intracranial enhancement.     The facial structures appear normal.       Impression    IMPRESSION:    1. Acute/subacute infarct in the posterior right basal ganglia/corona  radiata region. No evidence of hemorrhagic transformation. No  significant mass effect or midline shift.   2. Moderate diffuse parenchymal volume loss and white matter changes  likely due to chronic microvascular ischemic disease.  3. Probable chronic microimages in the left thalamus and right  cerebellum.      ASTHYN ARCHER MD   XR Video Swallow w/o Esophagram w/SLP or OT    Narrative    VIDEO SWALLOWING EVALUATION   1/18/2020 9:44 AM     HISTORY: Assess pharyngeal phase.    COMPARISON: None.    FLUOROSCOPY TIME: 1.4 minutes     Number of cine runs: 12    FINDINGS:    Thin: Premature spill to the piriforms. Minimal  flash penetration on  most swallows. Sawyer aspiration with spontaneous cough by straw only.  Otherwise normal.    Nectar: Premature spill to the piriforms. Mild flash penetration with  most swallows. Otherwise normal.    Honey: Premature spill to the vallecula. Otherwise normal.    Pudding: Premature spill to the vallecula. Otherwise normal.    Semisolid: Normal.    Solid: Normal.    This study only includes the cervical esophagus.    RUBY CARBONE MD   XR Chest 2 Views    Narrative    XR CHEST 2 VW  2020 1:58 PM       INDICATION: Fever and hypoxia  COMPARISON: 1/15/2020      Impression    IMPRESSION: Implanted cardiac monitor is new. Lung volumes are low  with new infiltrate or atelectasis in the left lower lobe. Lungs are  otherwise clear.    ISAK TIJERINA MD   EP Device    Narrative    PROCEDURES PERFORMED:   1. Implantation of an implantable loop recorder (ILR)   2. Conscious sedation.     INDICATION: Cryptogenic CVA    HISTORY OF PRESENT ILLNESS: This is a 79 year old year-old patient with a   history of CVA suspicious for embolic source. Patient is referred for an   ILR.    METHOD: The patient was prepped and draped in the usual manner. . 1%   lidocaine was infiltrated into the area located below the mid left   clavicle. An incision was made with a special blade. The device was then   injected into the subcutaneous tissue. Steri-Strips and an OpSite dressing   were then placed over the incision. The patient was then transferred back   to the neurology floor in stable condition.     COMPLICATIONS: None.     MDT, [id number]. [id number]    CONCLUSION: Uneventful implantation of an implantable loop recorder     Evgeny Parisi MD     Echocardiogram Complete - Bubble study    Narrative    [id number]  [id number]  [id number]  [id number]^ZEINAB^TIFFANY^FAITH           [facility]  Echocardiography Laboratory  [address] [place] [address]        Name: SHERI THORPE  MRN: [id number]  :  1940  Study Date: 01/16/2020 01:40 PM  Age: 79 yrs  Gender: Male  Patient Location: [facility]  Reason For Study: CVA  Ordering Physician: TIFFANY ARRIOLA  Referring Physician: Augustin Thomas  Performed By: Monserrat Ken     BSA: 2.4 m2  Height: 72 in  Weight: 271 lb  HR: 95  BP: 115/65 mmHg  _____________________________________________________________________________  __        Procedure  Complete Portable Bubble Echo Adult. Optison (NDC #[id number]) given  intravenously.  _____________________________________________________________________________  __        Interpretation Summary     Technically VERY difficult study despite contrast use. Study is inadequate for  accurate wall motion analysis.  The visual ejection fraction is estimated at 60-65%.  While bubble study is negative, it is very poor quality.  If there is a concern for cardiac source of CVA, alternative imaging such as  VICKEY could be considered if clinically appropriate/ indicated.  _____________________________________________________________________________  __        Left Ventricle  The left ventricle is normal in size. The visual ejection fraction is  estimated at 60-65%. Diastolic Doppler findings (E/E' ratio and/or other  parameters) suggest left ventricular filling pressures are indeterminate.     Right Ventricle  The right ventricle is mildly dilated. The right ventricular systolic function  is normal.     Atria  Normal left atrial size. Right atrial size is normal.     Mitral Valve  Evaluation of regurgitation is inadequate. There is trace to mild mitral  regurgitation.        Tricuspid Valve  Right ventricular systolic pressure could not be approximated due to  inadequate tricuspid regurgitation. There is trace tricuspid regurgitation.     Aortic Valve  The aortic valve is not well visualized. No hemodynamically significant  valvular aortic stenosis.     Pulmonic Valve  The pulmonic valve is not well visualized.     Vessels  The aortic  root is normal size. The aortic root is not well visualized.     Pericardium  There is no pericardial effusion.     _____________________________________________________________________________  __  MMode/2D Measurements & Calculations  IVSd: 1.0 cm  LVIDd: 4.1 cm  LVIDs: 2.5 cm  LVPWd: 1.2 cm  FS: 38.5 %  LV mass(C)d: 154.7 grams  LV mass(C)dI: 63.8 grams/m2     Ao root diam: 3.9 cm  asc Aorta Diam: 3.8 cm  LVOT diam: 2.0 cm  LVOT area: 3.2 cm2  RWT: 0.59        Doppler Measurements & Calculations  MV E max ten: 56.6 cm/sec  MV A max ten: 95.9 cm/sec  MV E/A: 0.59  MV dec time: 0.22 sec  LV V1 max P.6 mmHg  LV V1 max: 80.2 cm/sec  LV V1 VTI: 15.9 cm  SV(LVOT): 50.4 ml  SI(LVOT): 20.8 ml/m2  PA acc time: 0.11 sec  E/E' av.2  Lateral E/e': 8.1  Medial E/e': 10.4              _____________________________________________________________________________  __        Report approved by: Maynor Jasso 2020 03:01 PM

## 2020-01-23 NOTE — PLAN OF CARE
Speech Language Therapy Discharge Summary    Reason for therapy discharge:    Discharged to transitional care facility.    Progress towards therapy goal(s). See goals on Care Plan in [facility] electronic health record for goal details.  Goals partially met.  Barriers to achieving goals:   discharge from facility.    Therapy recommendation(s):    Continued therapy is recommended.  Rationale/Recommendations:  SLP at next level of care for management of dysphagia given swallow function is below baseline.      At time of discharge -     Recommendations: continue dysphagia diet level 1 with HONEY thick liquids via tsp or small single cup sip with cue to hold in oral cavity for 1-2 seconds prior to swallowing. Pt must be fully upright for PO, slow pacing, alternate between solids/liquids every 1-2 bites. Hold if any increased difficulty or too lethargic.      Speech: Improved speech intelligiblity with 100% in conversation. Pt recalled strategies and further reviewed dysarthria treatment and practice activities.

## 2020-01-23 NOTE — PROVIDER NOTIFICATION
Page out to Dr. Hernandez for low urine output. 190 ml over 8 hours.     500 ml NS bolus order obtainted.

## 2020-01-23 NOTE — PLAN OF CARE
Pt here with stroke. A&Ox4. Neuros with garbled speech, slight left facial droop, 1/5 LUE, weak slow grasp, 2-3/5 LLE. VSS on 1L O2d. Tele SR. DD1 diet, honey thick liquids, total feed. Takes pills whole in applesauce. Up with lift and 2 assist. Turn and reposition q2. Denies pain. Urine output increased after 500cc bolus. Lung sounds coarse, productive cough. Pt scoring green on the Aggression Stop Light Tool. Plan to go to [facility], hopefully today.

## 2020-01-23 NOTE — PROGRESS NOTES
AINSLEY    I: SW received an update patient may be ready for discharge to [facility] today. JEROME spoke w/admissions who stated pt can come anytime. [facility] stretcher was arranged for:  1530 (3:30) PCS form was completed, faxed to [facility] and placed on chart. SW updated staff. Awaiting orders.     P: Continue to assist as needed.    TIFFANY Sprague   [facility]  [phone]    UPDATE@1424: Orders and PAS were faxed to facility. JEROME updated spouse of discharge plan. No further SW interventions anticipated at this time.  PAS-RR    D: Per DHS regulation, SW completed and submitted PAS-RR to [place] [employer] Direct Connect via the Senior LinkAge Line.  PAS-RR confirmation # is : [id number]    P: Further questions may be directed to Senior LinkAge Line at #[phone], option #4 for PAS-RR staff.

## 2020-01-24 ENCOUNTER — NURSING HOME VISIT (OUTPATIENT)
Dept: GERIATRICS | Facility: CLINIC | Age: 80
End: 2020-01-24
Payer: COMMERCIAL

## 2020-01-24 VITALS
SYSTOLIC BLOOD PRESSURE: 128 MMHG | HEART RATE: 63 BPM | WEIGHT: 272 LBS | HEIGHT: 72 IN | RESPIRATION RATE: 18 BRPM | OXYGEN SATURATION: 95 % | TEMPERATURE: 98.3 F | BODY MASS INDEX: 36.84 KG/M2 | DIASTOLIC BLOOD PRESSURE: 65 MMHG

## 2020-01-24 DIAGNOSIS — J44.9 CHRONIC OBSTRUCTIVE PULMONARY DISEASE, UNSPECIFIED COPD TYPE (H): ICD-10-CM

## 2020-01-24 DIAGNOSIS — R53.1 LEFT-SIDED WEAKNESS: ICD-10-CM

## 2020-01-24 DIAGNOSIS — F32.A DEPRESSION, UNSPECIFIED DEPRESSION TYPE: ICD-10-CM

## 2020-01-24 DIAGNOSIS — R33.9 URINARY RETENTION: ICD-10-CM

## 2020-01-24 DIAGNOSIS — I63.9 CEREBROVASCULAR ACCIDENT (CVA), UNSPECIFIED MECHANISM (H): Primary | ICD-10-CM

## 2020-01-24 PROCEDURE — 99207 ZZC CDG-MDM COMPONENT: MEETS LOW - DOWN CODED: CPT | Performed by: NURSE PRACTITIONER

## 2020-01-24 PROCEDURE — 99309 SBSQ NF CARE MODERATE MDM 30: CPT | Performed by: NURSE PRACTITIONER

## 2020-01-24 RX ORDER — FUROSEMIDE 20 MG
20 TABLET ORAL DAILY
COMMUNITY
Start: 2020-01-24 | End: 2020-01-30

## 2020-01-24 ASSESSMENT — MIFFLIN-ST. JEOR: SCORE: 1996.78

## 2020-01-24 NOTE — PROGRESS NOTES
[facility]  PRIMARY CARE PROVIDER AND CLINIC:  Augustin Thomas MD, [address] / [place] [address]  Chief Complaint   Patient presents with     Hospital F/U     [place] Medical Record Number:  [id number]  Place of Service where encounter took place:  [facility]) [[id number]]    Ron Gilmore  is a 79 year old  (1940), admitted to the above facility from  [facility]. Hospital stay 1/14/20 through 1/23/20..  Admitted to this facility for  rehab, medical management and nursing care.    HPI:    HPI information obtained from: facility chart records, facility staff and patient report.   Brief Summary of Hospital Course:   Updates on Status Since Skilled nursing Admission:     Ron Gilmore is a 79 yr old male admitted to [facility] for rehabilitation s/p hospitalization [facility] 1/14/20-1/23/20 for CVA with left sided weakness (left upper arm weakness > left lower leg), and garbled speech (speech improve) & left lower lobe pneumonia. Patient discharged on urinary catheter due to retention, history BPH, with plan void trial in couple days. Patient discharged on 3 day course of lasix    PMHx prior CVA 2006 with minimal residual deficits with some weakness in left leg and dysphagia, non-ambulatory 2/2 back surgery and knee problems and largely bedbound, however, able to transfer to power wheelchair.  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 Reports has gained some minor improvement in left hand movement. Has history of dysphagia  States he had bowel movement yesterday, had been constipated prior for a week  Denies nausea        CODE STATUS/ADVANCE DIRECTIVES DISCUSSION:   CPR/Full code   Patient's living condition: lives with spouse  ALLERGIES: Patient has no  known allergies.  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 He also has no past medical history of Asymptomatic human immunodeficiency virus (HIV) infection status (H), Blood in semen, Cerebral palsy (H), Complication of anesthesia, Congenital renal agenesis and dysgenesis, Difficult intubation, Epididymitis, bilateral, Epididymitis, left, Epididymitis, right, Goiter, Hernia, abdominal, History of spinal cord injury, History of thrombophlebitis, Horseshoe kidney, Hydrocephalus (H), Malignant hyperthermia, Mumps, Orchitis, epididymitis, and epididymo-orchitis, with abscess, Palpitations, Parkinsons disease (H), Penile discharge, PONV (postoperative nausea and vomiting), Prostate infection, Spider veins, Spina bifida (H), Spinal headache, STD (sexually transmitted disease), Swelling of testicle, Tethered cord (H), or Tuberculosis.  PAST SURGICAL HISTORY:   has a past surgical history that includes Laminectomy lumbar one level; joint replacement (Right); knee surgery (Bilateral); Appendectomy open; Tonsillectomy; Arthroscopy shoulder rotator cuff repair; cataracts (Bilateral); Cystoscopy (10/19/2011); Cholecystectomy; Eye surgery; colonoscopy; Cystoscopy, transurethral resection (TUR) prostate, combined (N/A, 2/21/2018); and Electrophysiology Loop Recorder Implant (N/A, 1/20/2020).  FAMILY HISTORY: family history includes Prostate Cancer in his father.  SOCIAL HISTORY:   reports that he has quit smoking. He has never used smokeless tobacco. He  reports that he does not drink alcohol or use drugs.    Post Discharge Medication Reconciliation Status: discharge medications reconciled and changed, per note/orders (see AVS)    Current Outpatient Medications   Medication Sig Dispense Refill     albuterol (2.5 MG/3ML) 0.083% neb solution Take 1 vial (2.5 mg) by nebulization every 2 hours as needed for shortness of breath / dyspnea or other (dyspnea) 360 mL 0     ALLOPURINOL PO Take 300 mg by mouth daily       amoxicillin-clavulanate (AUGMENTIN) 875-125 MG tablet Take 1 tablet by mouth 2 times daily for 7 days       [START ON 2/7/2020] aspirin (ASA) 325 MG EC tablet Take 1 tablet (325 mg) by mouth daily       aspirin (ASA) 81 MG EC tablet Take 1 tablet (81 mg) by mouth daily for 17 days 17 tablet 0     atorvastatin (LIPITOR) 10 MG tablet Take 1 tablet (10 mg) by mouth every evening       benzocaine-menthol (CHLORASEPTIC) 6-10 MG lozenge Place 1 lozenge inside cheek every hour as needed for sore throat (dry/sore throat without fever)       clopidogrel (PLAVIX) 75 MG tablet Take 1 tablet (75 mg) by mouth daily for 17 days 17 tablet 0     Emollient (AMLACTIN ULTRA EX) Externally apply topically daily APPLY TO FEET        fluocinonide-emollient (LIDEX-E) 0.05 % cream Apply topically 2 times daily        furosemide (LASIX) 20 MG tablet Take 20 mg by mouth daily       metoprolol tartrate (LOPRESSOR) 25 MG tablet Take 1 tablet (25 mg) by mouth 2 times daily       PARoxetine HCl (PAXIL PO) Take 20 mg by mouth daily        tamsulosin (FLOMAX) 0.4 MG capsule Take 1 capsule (0.4 mg) by mouth every morning 14 capsule 0       ROS:  10 point ROS of systems including Constitutional, Eyes, Respiratory, Cardiovascular, Gastroenterology, Genitourinary, Integumentary, Musculoskeletal, Psychiatric were all negative except for pertinent positives noted in my HPI.    Vitals:  /65   Pulse 63   Temp 98.3  F (36.8  C)   Resp 18   Ht 1.829 m (6')   Wt 123.4 kg (272 lb)   SpO2  95%   BMI 36.89 kg/m    Exam:  GENERAL APPEARANCE:  Alert, in no distress  ENT:  Mouth and posterior oropharynx normal, moist mucous membranes  EYES:  EOM, conjunctivae, lids, pupils and irises normal  NECK:  No adenopathy,masses or thyromegaly  RESP:  respiratory effort and palpation of chest normal, lungs clear to auscultation , no respiratory distress  CV:  Palpation and auscultation of heart done , regular rate and rhythm, no murmur, rub, or gallop  ABDOMEN:  normal bowel sounds, soft, nontender, no hepatosplenomegaly or other masses  M/S:   lying in bed  SKIN:  Inspection of skin and subcutaneous tissue baseline, pressure dressing on left chest  NEURO:   Cranial nerves 2-12 are normal tested and grossly at patient's baseline  PSYCH:  oriented X 3    Lab/Diagnostic data:  Labs done in SNF are in [facility]. Please refer to them using SmartCare system/Care Everywhere.    ASSESSMENT/PLAN:    CVA  H/o CVA in 2006 with L facial, arm and leg weakness  MRI with acute/ subacute infarct in the posterior R basal ganglia/ corona radiata region.    Plavix 75 mg to continue for 3 weeks with aspirin 81 mg 3 weeks, stop both and then continue with aspirin 325 mg daily.    Continue physical therapy, occupational therapy, speech therapy   implantable loop recorder was placed on 1/20, follow up cardiologist. Remote monitor plug in 5-10 ft of bedside  Pressure dressing can remove in 2 days  Follow up [facility] 3/3/20 at 1PM in [place]  Heart clinic # [phone]; device clinic       left lower lobe pneumonia  Received Zosyn then discharge on Augmentin and plan to continue course  Remains on oxygen 1L, with goal to wean off   Vitals stable & patient denies shortness of breath, (states he typically is 89% at home on room air)      BRAD  Supposed to use CPAP, however, dose not use  Noted to have elevated bicarb and difficult arousal at times in AM  Obese man with short neck     hypertension  chronic managed    Metoprolol dose was increased to 25 mg twice daily in hospital    Depression  History mental breakdown per patient, ECT with some short term memory loss & chemical dependency   Patient report mood fairly stable at this time  continue his PTA paroxetine 20 mg a day for depression  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 2 live in [place]. Wife supportive, however, physically decline due to health issues     BPH  PTA on tamsulosin 0.4 mg daily, continue, patient had urinary retention while he was here, he has a Cardona catheter present, since we did not have the opportunity to voiding trials will discharge patient to TCU with catheter, please do voiding trials in 1 to 2 days.    COPD  PTA on prn albuterol nebs, continue         Orders written by provider at facility      Total time spent with patient visit at the skilled nursing facility was 42 min including patient visit and review of past records. Greater than 50% of total time spent with counseling and coordinating care due to coordinating care with nurse on admission orders, coordinating care with follow up labs and appointments and counseling patient on the reason for their hospitalization and what the treatment is, the plan of SNF stay and projected length of stay, current medications (treatments) reconciled from the hospital, recent past lab and imaging results and subsequent treatment plan and follow up appointments    Electronically signed by:  ELIZABETH Howe CNP

## 2020-01-24 NOTE — LETTER
1/24/2020        RE: Ron Gilmore  [address]  [place] [address]        [facility]  PRIMARY CARE PROVIDER AND CLINIC:  Augustin Thomas MD, [address] / [place] [address]  Chief Complaint   Patient presents with     Hospital F/U     [facility] Record Number:  [id number]  Place of Service where encounter took place:  [facility]) [[id number]]    Ron Gilmore  is a 79 year old  (1940), admitted to the above facility from  [facility]. Hospital stay 1/14/20 through 1/23/20..  Admitted to this facility for  rehab, medical management and nursing care.    HPI:    HPI information obtained from: facility chart records, facility staff and patient report.   Brief Summary of Hospital Course:   Updates on Status Since Skilled nursing Admission:     Ron Gilmore is a 79 yr old male admitted to [facility] for rehabilitation s/p hospitalization [facility] 1/14/20-1/23/20 for CVA with left sided weakness (left upper arm weakness > left lower leg), and garbled speech (speech improve) & left lower lobe pneumonia. Patient discharged on urinary catheter due to retention, history BPH, with plan void trial in couple days. Patient discharged on 3 day course of lasix    PMHx prior CVA 2006 with minimal residual deficits with some weakness in left leg and dysphagia, non-ambulatory 2/2 back surgery and knee problems and largely bedbound, however, able to transfer to power wheelchair.  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 Reports has gained some minor improvement in left hand movement. Has history of dysphagia  States he had bowel movement yesterday, had been constipated prior for a week  Denies nausea        CODE STATUS/ADVANCE DIRECTIVES  DISCUSSION:   CPR/Full code   Patient's living condition: lives with spouse  ALLERGIES: Patient has no known allergies.  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 He also has no past medical history of Asymptomatic human immunodeficiency virus (HIV) infection status (H), Blood in semen, Cerebral palsy (H), Complication of anesthesia, Congenital renal agenesis and dysgenesis, Difficult intubation, Epididymitis, bilateral, Epididymitis, left, Epididymitis, right, Goiter, Hernia, abdominal, History of spinal cord injury, History of thrombophlebitis, Horseshoe kidney, Hydrocephalus (H), Malignant hyperthermia, Mumps, Orchitis, epididymitis, and epididymo-orchitis, with abscess, Palpitations, Parkinsons disease (H), Penile discharge, PONV (postoperative nausea and vomiting), Prostate infection, Spider veins, Spina bifida (H), Spinal headache, STD (sexually transmitted disease), Swelling of testicle, Tethered cord (H), or Tuberculosis.  PAST SURGICAL HISTORY:   has a past surgical history that includes Laminectomy lumbar one level; joint replacement (Right); knee surgery (Bilateral); Appendectomy open; Tonsillectomy; Arthroscopy shoulder rotator cuff repair; cataracts (Bilateral); Cystoscopy (10/19/2011); Cholecystectomy; Eye surgery; colonoscopy; Cystoscopy, transurethral resection (TUR) prostate, combined (N/A, 2/21/2018); and Electrophysiology Loop Recorder Implant (N/A, 1/20/2020).  FAMILY HISTORY: family history includes Prostate Cancer in his  father.  SOCIAL HISTORY:   reports that he has quit smoking. He has never used smokeless tobacco. He reports that he does not drink alcohol or use drugs.    Post Discharge Medication Reconciliation Status: discharge medications reconciled and changed, per note/orders (see AVS)    Current Outpatient Medications   Medication Sig Dispense Refill     albuterol (2.5 MG/3ML) 0.083% neb solution Take 1 vial (2.5 mg) by nebulization every 2 hours as needed for shortness of breath / dyspnea or other (dyspnea) 360 mL 0     ALLOPURINOL PO Take 300 mg by mouth daily       amoxicillin-clavulanate (AUGMENTIN) 875-125 MG tablet Take 1 tablet by mouth 2 times daily for 7 days       [START ON 2/7/2020] aspirin (ASA) 325 MG EC tablet Take 1 tablet (325 mg) by mouth daily       aspirin (ASA) 81 MG EC tablet Take 1 tablet (81 mg) by mouth daily for 17 days 17 tablet 0     atorvastatin (LIPITOR) 10 MG tablet Take 1 tablet (10 mg) by mouth every evening       benzocaine-menthol (CHLORASEPTIC) 6-10 MG lozenge Place 1 lozenge inside cheek every hour as needed for sore throat (dry/sore throat without fever)       clopidogrel (PLAVIX) 75 MG tablet Take 1 tablet (75 mg) by mouth daily for 17 days 17 tablet 0     Emollient (AMLACTIN ULTRA EX) Externally apply topically daily APPLY TO FEET        fluocinonide-emollient (LIDEX-E) 0.05 % cream Apply topically 2 times daily        furosemide (LASIX) 20 MG tablet Take 20 mg by mouth daily       metoprolol tartrate (LOPRESSOR) 25 MG tablet Take 1 tablet (25 mg) by mouth 2 times daily       PARoxetine HCl (PAXIL PO) Take 20 mg by mouth daily        tamsulosin (FLOMAX) 0.4 MG capsule Take 1 capsule (0.4 mg) by mouth every morning 14 capsule 0       ROS:  10 point ROS of systems including Constitutional, Eyes, Respiratory, Cardiovascular, Gastroenterology, Genitourinary, Integumentary, Musculoskeletal, Psychiatric were all negative except for pertinent positives noted in my HPI.    Vitals:  /65    Pulse 63   Temp 98.3  F (36.8  C)   Resp 18   Ht 1.829 m (6')   Wt 123.4 kg (272 lb)   SpO2 95%   BMI 36.89 kg/m     Exam:  GENERAL APPEARANCE:  Alert, in no distress  ENT:  Mouth and posterior oropharynx normal, moist mucous membranes  EYES:  EOM, conjunctivae, lids, pupils and irises normal  NECK:  No adenopathy,masses or thyromegaly  RESP:  respiratory effort and palpation of chest normal, lungs clear to auscultation , no respiratory distress  CV:  Palpation and auscultation of heart done , regular rate and rhythm, no murmur, rub, or gallop  ABDOMEN:  normal bowel sounds, soft, nontender, no hepatosplenomegaly or other masses  M/S:   lying in bed  SKIN:  Inspection of skin and subcutaneous tissue baseline, pressure dressing on left chest  NEURO:   Cranial nerves 2-12 are normal tested and grossly at patient's baseline  PSYCH:  oriented X 3    Lab/Diagnostic data:  Labs done in SNF are in [facility]. Please refer to them using [employer]/[facility] Everywhere.    ASSESSMENT/PLAN:    CVA  H/o CVA in 2006 with L facial, arm and leg weakness  MRI with acute/ subacute infarct in the posterior R basal ganglia/ corona radiata region.    Plavix 75 mg to continue for 3 weeks with aspirin 81 mg 3 weeks, stop both and then continue with aspirin 325 mg daily.    Continue physical therapy, occupational therapy, speech therapy   implantable loop recorder was placed on 1/20, follow up cardiologist. Remote monitor plug in 5-10 ft of bedside  Pressure dressing can remove in 2 days  Follow up [facility] 3/3/20 at 1PM in [place]  Heart clinic # [phone]; device clinic       left lower lobe pneumonia  Received Zosyn then discharge on Augmentin and plan to continue course  Remains on oxygen 1L, with goal to wean off   Vitals stable & patient denies shortness of breath, (states he typically is 89% at home on room air)      BRAD  Supposed to use CPAP, however, dose not use  Noted to have elevated bicarb and  difficult arousal at times in AM  Obese man with short neck     hypertension  chronic managed   Metoprolol dose was increased to 25 mg twice daily in hospital    Depression  History mental breakdown per patient, ECT with some short term memory loss & chemical dependency   Patient report mood fairly stable at this time  continue his PTA paroxetine 20 mg a day for depression  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 2 live in [place]. Wife supportive, however, physically decline due to health issues     BPH  PTA on tamsulosin 0.4 mg daily, continue, patient had urinary retention while he was here, he has a Cardona catheter present, since we did not have the opportunity to voiding trials will discharge patient to TCU with catheter, please do voiding trials in 1 to 2 days.    COPD  PTA on prn albuterol nebs, continue         Orders written by provider at facility      Total time spent with patient visit at the skilled nursing facility was 42 min including patient visit and review of past records. Greater than 50% of total time spent with counseling and coordinating care due to coordinating care with nurse on admission orders, coordinating care with follow up labs and appointments and counseling patient on the reason for their hospitalization and what the treatment is, the plan of SNF stay and projected length of stay, current medications (treatments) reconciled from the hospital, recent past lab and imaging results and subsequent treatment plan and follow up appointments    Electronically signed by:  ELIZABETH Howe CNP                         Sincerely,        ELIZABETH Howe CNP

## 2020-01-27 ENCOUNTER — HOSPITAL LABORATORY (OUTPATIENT)
Dept: OTHER | Facility: CLINIC | Age: 80
End: 2020-01-27

## 2020-01-27 ENCOUNTER — DOCUMENTATION ONLY (OUTPATIENT)
Dept: OTHER | Facility: CLINIC | Age: 80
End: 2020-01-27

## 2020-01-29 ENCOUNTER — NURSING HOME VISIT (OUTPATIENT)
Dept: GERIATRICS | Facility: CLINIC | Age: 80
End: 2020-01-29

## 2020-01-29 DIAGNOSIS — R53.1 LEFT-SIDED WEAKNESS: ICD-10-CM

## 2020-01-29 DIAGNOSIS — I63.9 CEREBROVASCULAR ACCIDENT (CVA), UNSPECIFIED MECHANISM (H): Primary | ICD-10-CM

## 2020-01-29 DIAGNOSIS — R33.9 URINARY RETENTION: ICD-10-CM

## 2020-01-29 DIAGNOSIS — J44.9 CHRONIC OBSTRUCTIVE PULMONARY DISEASE, UNSPECIFIED COPD TYPE (H): ICD-10-CM

## 2020-01-29 PROCEDURE — 99305 1ST NF CARE MODERATE MDM 35: CPT | Performed by: INTERNAL MEDICINE

## 2020-01-30 ENCOUNTER — NURSING HOME VISIT (OUTPATIENT)
Dept: GERIATRICS | Facility: CLINIC | Age: 80
End: 2020-01-30
Payer: COMMERCIAL

## 2020-01-30 VITALS
OXYGEN SATURATION: 95 % | TEMPERATURE: 98.5 F | DIASTOLIC BLOOD PRESSURE: 72 MMHG | WEIGHT: 255.3 LBS | SYSTOLIC BLOOD PRESSURE: 114 MMHG | BODY MASS INDEX: 34.58 KG/M2 | HEART RATE: 79 BPM | RESPIRATION RATE: 16 BRPM | HEIGHT: 72 IN

## 2020-01-30 DIAGNOSIS — J18.9 PNEUMONIA OF LEFT LOWER LOBE DUE TO INFECTIOUS ORGANISM: ICD-10-CM

## 2020-01-30 DIAGNOSIS — I63.9 CEREBROVASCULAR ACCIDENT (CVA), UNSPECIFIED MECHANISM (H): Primary | ICD-10-CM

## 2020-01-30 DIAGNOSIS — I10 ESSENTIAL HYPERTENSION: ICD-10-CM

## 2020-01-30 DIAGNOSIS — N40.1 BENIGN PROSTATIC HYPERPLASIA WITH INCOMPLETE BLADDER EMPTYING: ICD-10-CM

## 2020-01-30 DIAGNOSIS — E66.01 MORBID OBESITY, UNSPECIFIED OBESITY TYPE (H): ICD-10-CM

## 2020-01-30 DIAGNOSIS — R53.81 PHYSICAL DECONDITIONING: ICD-10-CM

## 2020-01-30 DIAGNOSIS — R53.1 LEFT-SIDED WEAKNESS: ICD-10-CM

## 2020-01-30 DIAGNOSIS — G47.33 OSA (OBSTRUCTIVE SLEEP APNEA): ICD-10-CM

## 2020-01-30 DIAGNOSIS — F32.A DEPRESSION, UNSPECIFIED DEPRESSION TYPE: ICD-10-CM

## 2020-01-30 DIAGNOSIS — R39.14 BENIGN PROSTATIC HYPERPLASIA WITH INCOMPLETE BLADDER EMPTYING: ICD-10-CM

## 2020-01-30 DIAGNOSIS — E11.9 TYPE 2 DIABETES MELLITUS WITHOUT COMPLICATION, WITHOUT LONG-TERM CURRENT USE OF INSULIN (H): ICD-10-CM

## 2020-01-30 DIAGNOSIS — J44.9 CHRONIC OBSTRUCTIVE PULMONARY DISEASE, UNSPECIFIED COPD TYPE (H): ICD-10-CM

## 2020-01-30 PROCEDURE — 99310 SBSQ NF CARE HIGH MDM 45: CPT | Performed by: NURSE PRACTITIONER

## 2020-01-30 ASSESSMENT — MIFFLIN-ST. JEOR: SCORE: 1921.03

## 2020-01-30 NOTE — LETTER
1/30/2020        RE: Ron Gilmore  [address]  [place] [address]        [facility]  [facility] Record Number:  [id number]  Place of Service where encounter took place:  [facility] ([facility]) [[id number]]  Chief Complaint   Patient presents with     Nursing Home Acute       HPI:    Ron Gilmore  is a 79 year old (1940), who is being seen today for an episodic care visit.  HPI information obtained from: facility chart records, facility staff, patient report and [facility] chart review.     Per recent TCU provider progress notes:  79 yr old male hospitalization [facility] 1/14/20-1/23/20 for CVA with left sided weakness (left upper arm weakness > left lower leg), and garbled speech (now improved) & left lower lobe pneumonia. Patient discharged with urinary catheter due to retention, history BPH (on Flomax 0.4 mg daily) and short course of lasix. PMHx prior CVA 2006 with minimal residual deficits with some weakness in left leg and dysphagia, non-ambulatory 2/2 back surgery and knee problems and largely bedbound or up in power wc. PMHx HTN, BRAD (noncompliant with CPAP), COPD (on PRN albuterol), recurrent UTIs & VRE in urine (note Candida <50,000 on UC, not treated due to asymptomatic), obesity. At discharge orders to take Plavix 75 mg to continue for 3 weeks with aspirin 81 mg 3 weeks, stop both and then continue with aspirin 325 mg daily and to f/u cards (implantable loop recorder placed 1/20). Completing Augmentin course and remains on o2, attempting to wean. HTN managed with metoprolol 25 mg BID. Depression with hx of ECT use: on paroxetine and referred to house psych.      Today's concern is:  Patient seen for episodic TCU visit. Reports tired, little change in left side weakness. Does have intact sensation in left arm and leg. Denies headaches, no dizziness. Remains congested and on o2 - agreeable with short term nebs to help clear congestion,  incentive spirometry. Needs assist with transfers from bed to power chair. Denies bowel issues. Reports occasional urinary retention - has been getting straight cath once daily or so, does not wish to replace Cardona - will increase Flomax dose at this time and monitor. No other acute concerns. Note SBP range 106-138 and weight down 17 lbs since admission with Body mass index is 34.62 kg/m . He has sats of 95% on 2 lpm of o2.     Past Medical and Surgical History reviewed in Epic today.    MEDICATIONS:  Current Outpatient Medications   Medication Sig Dispense Refill     albuterol (2.5 MG/3ML) 0.083% neb solution Take 1 vial (2.5 mg) by nebulization every 2 hours as needed for shortness of breath / dyspnea or other (dyspnea) 360 mL 0     ALLOPURINOL PO Take 300 mg by mouth daily       amoxicillin-clavulanate (AUGMENTIN) 875-125 MG tablet Take 1 tablet by mouth 2 times daily for 7 days       [START ON 2/7/2020] aspirin (ASA) 325 MG EC tablet Take 1 tablet (325 mg) by mouth daily       aspirin (ASA) 81 MG EC tablet Take 1 tablet (81 mg) by mouth daily for 17 days 17 tablet 0     atorvastatin (LIPITOR) 10 MG tablet Take 1 tablet (10 mg) by mouth every evening       benzocaine-menthol (CHLORASEPTIC) 6-10 MG lozenge Place 1 lozenge inside cheek every hour as needed for sore throat (dry/sore throat without fever)       clopidogrel (PLAVIX) 75 MG tablet Take 1 tablet (75 mg) by mouth daily for 17 days 17 tablet 0     Emollient (AMLACTIN ULTRA EX) Externally apply topically daily APPLY TO FEET        metoprolol tartrate (LOPRESSOR) 25 MG tablet Take 1 tablet (25 mg) by mouth 2 times daily       PARoxetine HCl (PAXIL PO) Take 20 mg by mouth daily        tamsulosin (FLOMAX) 0.4 MG capsule Take 1 capsule (0.4 mg) by mouth every morning (Patient taking differently: Take 0.8 mg by mouth every morning ) 14 capsule 0       REVIEW OF SYSTEMS:  10 point ROS of systems including Constitutional, Eyes, Respiratory, Cardiovascular,  Gastroenterology, Genitourinary, Integumentary, Musculoskeletal, Psychiatric were all negative except for pertinent positives noted in my HPI.    Objective:  /72   Pulse 79   Temp 98.5  F (36.9  C)   Resp 16   Ht 1.829 m (6')   Wt 115.8 kg (255 lb 4.8 oz)   SpO2 95%   BMI 34.62 kg/m     Exam:  GENERAL APPEARANCE:  Alert, in no distress, pleasant, cooperative, oriented x 4, obese  EYES:  EOM, lids, pupils and irises normal, sclera clear and conjunctiva normal, no discharge or mattering on lids or lashes noted  ENT:  Mouth normal, moist mucous membranes, nose normal without drainage or crusting, external ears without lesions, hearing acuity intact  RESP:  respiratory effort normal, no chest wall tenderness, no respiratory distress, Lung sounds diminished bases and congested upper airways, patient is on o2  CV:  Auscultation of heart done, rate and rhythm normal and controlled, no murmur, no rub or gallop. Edema none bilateral lower extremities. VASCULAR: no open areas lower legs  ABDOMEN:  normal bowel sounds, soft, nontender, no palpable masses.  M/S:   Gait and station bedbound and wheelchair bound, no tenderness or swelling of the joints; generalized weakness,  digits long yellow toenails  SKIN:  Inspection and palpation of skin and subcutaneous tissue: skin on extremities warm, dry and intact without rashes  NEURO: cranial nerves 2-12 grossly intact, no facial asymmetry, no speech deficits. Pronounced weakness left extremities, sensation intact  PSYCH:  insight and judgement and memory intact, affect and mood flat    Labs:     Most Recent 3 CBC's:  Recent Labs   Lab Test 01/22/20  0838 01/21/20  0751 01/14/20  2356   WBC 5.8 11.6* 8.7   HGB 12.4* 13.6 13.4   MCV 94 93 93    176 184     Most Recent 3 BMP's:  Recent Labs   Lab Test 01/27/20  0602 01/22/20  0838 01/21/20  0751    135 138   POTASSIUM 3.9 3.4 3.6   CHLORIDE 97 98 97   CO2 35* 35* 40*   BUN 12 24 16   CR 0.78 0.79 0.76    ANIONGAP 4 2* 1*   RAJ 8.8 8.2* 8.6   GLC 86 107* 122*       ASSESSMENT/PLAN:  Cerebrovascular accident (CVA), unspecified mechanism (H)  Left-sided weakness  Physical deconditioning  Acute on chronic issues, little improvement in weakness. Continues therapies - f/u with progress next week. Meds and cards f/u as above.     Pneumonia of left lower lobe due to infectious organism (H)  BRAD (obstructive sleep apnea)  Chronic obstructive pulmonary disease, unspecified COPD type (H)  Acute on chronic, remains congested and on o2. Add DuoNebs, incentive spirometry and attempt to wean off oxygen. Monitor. Does not use CPAP per self report.     Essential hypertension  Chronic, well managed. Vs, wt and meds as ordered. F/U next visit.     Depression, unspecified depression type  By history, meds as PTA and in house psych f/u as recommended.     Benign prostatic hyperplasia with incomplete bladder emptying  Ongoing issue, occasional retention. Increase Flomax dose, continue PRN straight catheter and f/u next week     Morbid obesity, unspecified obesity type (H)  Type 2 diabetes mellitus without complication, without long-term current use of insulin (H)  Chronic issues, weight down and BG not routinely checked (diet controlled). Encourage healthy dietary choices, monitor weight per routine.     Orders written by provider at facility  1. IS ten reps QID  2. Wean off o2 as able  3. Trim toenails or add to podiatry list  4. DuoNebs 1 neb QID x 5 days diagnosis pneumonia  5. Increase Flomax to 0.8 mg PO daily diagnosis urinary retention.     Spent total unit/floor time of 37 minutes consisted of the following: Examination of patient, reviewing the record including pertinent labs and imaging, placing orders, and completing documentation.  More than 50% of this time (20 minutes) was spent in coordination of care with the patient, nursing staff and other healthcare providers.   This time was spent on discussing the care plan  including review of current status and hospital course and discharge recommendations, medications, discharge/safe placement, specialty follow up need.      Time with patient/family included: Discussion of diagnostic results, diagnosis and prognosis, overall goal and disposition plan.      Medical decision making and discussions included:   Risks/benefits of treating urinary retention with catheterization vs meds, needed monitoring.   Risks/benefits of pneumonia and COPD with nebs, IS, wean off o2  PT/OT progress and safe mobility in current state  Diabetes management plan to include dietary management, no meds  HTN management plan to include medications and monitoring   COPD management plan to include nebs, oxygen as needed.   Anticoagulation ordered for recent CVA treatment    Rehab management plan discussion included-  Follow up needed with card, neurology and ?urology if retention persists  Medication changes to include Flomax increase and duo neb addition    Time on communication of care included:  Updated RN on new orders     Electronically signed by:  ELIZABETH Ayala CNP             Sincerely,        ELIZABETH Ayala CNP

## 2020-01-30 NOTE — PROGRESS NOTES
[facility]  [place] Medical Record Number:  [id number]  Place of Service where encounter took place:  [facility] [[id number]]  Chief Complaint   Patient presents with     Nursing Home Acute       HPI:    Ron Gilmore  is a 79 year old (1940), who is being seen today for an episodic care visit.  HPI information obtained from: facility chart records, facility staff, patient report and [facility] chart review.     Per recent TCU provider progress notes:  79 yr old male hospitalization [facility] 1/14/20-1/23/20 for CVA with left sided weakness (left upper arm weakness > left lower leg), and garbled speech (now improved) & left lower lobe pneumonia. Patient discharged with urinary catheter due to retention, history BPH (on Flomax 0.4 mg daily) and short course of lasix. PMHx prior CVA 2006 with minimal residual deficits with some weakness in left leg and dysphagia, non-ambulatory 2/2 back surgery and knee problems and largely bedbound or up in power wc. PMHx HTN, BRAD (noncompliant with CPAP), COPD (on PRN albuterol), recurrent UTIs & VRE in urine (note Candida <50,000 on UC, not treated due to asymptomatic), obesity. At discharge orders to take Plavix 75 mg to continue for 3 weeks with aspirin 81 mg 3 weeks, stop both and then continue with aspirin 325 mg daily and to f/u cards (implantable loop recorder placed 1/20). Completing Augmentin course and remains on o2, attempting to wean. HTN managed with metoprolol 25 mg BID. Depression with hx of ECT use: on paroxetine and referred to house psych.      Today's concern is:  Patient seen for episodic TCU visit. Reports tired, little change in left side weakness. Does have intact sensation in left arm and leg. Denies headaches, no dizziness. Remains congested and on o2 - agreeable with short term nebs to help clear congestion, incentive spirometry. Needs assist with transfers from bed to power chair. Denies bowel issues.  Reports occasional urinary retention - has been getting straight cath once daily or so, does not wish to replace Cardona - will increase Flomax dose at this time and monitor. No other acute concerns. Note SBP range 106-138 and weight down 17 lbs since admission with Body mass index is 34.62 kg/m . He has sats of 95% on 2 lpm of o2.     Past Medical and Surgical History reviewed in Epic today.    MEDICATIONS:  Current Outpatient Medications   Medication Sig Dispense Refill     albuterol (2.5 MG/3ML) 0.083% neb solution Take 1 vial (2.5 mg) by nebulization every 2 hours as needed for shortness of breath / dyspnea or other (dyspnea) 360 mL 0     ALLOPURINOL PO Take 300 mg by mouth daily       amoxicillin-clavulanate (AUGMENTIN) 875-125 MG tablet Take 1 tablet by mouth 2 times daily for 7 days       [START ON 2/7/2020] aspirin (ASA) 325 MG EC tablet Take 1 tablet (325 mg) by mouth daily       aspirin (ASA) 81 MG EC tablet Take 1 tablet (81 mg) by mouth daily for 17 days 17 tablet 0     atorvastatin (LIPITOR) 10 MG tablet Take 1 tablet (10 mg) by mouth every evening       benzocaine-menthol (CHLORASEPTIC) 6-10 MG lozenge Place 1 lozenge inside cheek every hour as needed for sore throat (dry/sore throat without fever)       clopidogrel (PLAVIX) 75 MG tablet Take 1 tablet (75 mg) by mouth daily for 17 days 17 tablet 0     Emollient (AMLACTIN ULTRA EX) Externally apply topically daily APPLY TO FEET        metoprolol tartrate (LOPRESSOR) 25 MG tablet Take 1 tablet (25 mg) by mouth 2 times daily       PARoxetine HCl (PAXIL PO) Take 20 mg by mouth daily        tamsulosin (FLOMAX) 0.4 MG capsule Take 1 capsule (0.4 mg) by mouth every morning (Patient taking differently: Take 0.8 mg by mouth every morning ) 14 capsule 0       REVIEW OF SYSTEMS:  10 point ROS of systems including Constitutional, Eyes, Respiratory, Cardiovascular, Gastroenterology, Genitourinary, Integumentary, Musculoskeletal, Psychiatric were all negative except  for pertinent positives noted in my HPI.    Objective:  /72   Pulse 79   Temp 98.5  F (36.9  C)   Resp 16   Ht 1.829 m (6')   Wt 115.8 kg (255 lb 4.8 oz)   SpO2 95%   BMI 34.62 kg/m    Exam:  GENERAL APPEARANCE:  Alert, in no distress, pleasant, cooperative, oriented x 4, obese  EYES:  EOM, lids, pupils and irises normal, sclera clear and conjunctiva normal, no discharge or mattering on lids or lashes noted  ENT:  Mouth normal, moist mucous membranes, nose normal without drainage or crusting, external ears without lesions, hearing acuity intact  RESP:  respiratory effort normal, no chest wall tenderness, no respiratory distress, Lung sounds diminished bases and congested upper airways, patient is on o2  CV:  Auscultation of heart done, rate and rhythm normal and controlled, no murmur, no rub or gallop. Edema none bilateral lower extremities. VASCULAR: no open areas lower legs  ABDOMEN:  normal bowel sounds, soft, nontender, no palpable masses.  M/S:   Gait and station bedbound and wheelchair bound, no tenderness or swelling of the joints; generalized weakness,  digits long yellow toenails  SKIN:  Inspection and palpation of skin and subcutaneous tissue: skin on extremities warm, dry and intact without rashes  NEURO: cranial nerves 2-12 grossly intact, no facial asymmetry, no speech deficits. Pronounced weakness left extremities, sensation intact  PSYCH:  insight and judgement and memory intact, affect and mood flat    Labs:     Most Recent 3 CBC's:  Recent Labs   Lab Test 01/22/20  0838 01/21/20  0751 01/14/20  2356   WBC 5.8 11.6* 8.7   HGB 12.4* 13.6 13.4   MCV 94 93 93    176 184     Most Recent 3 BMP's:  Recent Labs   Lab Test 01/27/20  0602 01/22/20  0838 01/21/20  0751    135 138   POTASSIUM 3.9 3.4 3.6   CHLORIDE 97 98 97   CO2 35* 35* 40*   BUN 12 24 16   CR 0.78 0.79 0.76   ANIONGAP 4 2* 1*   RAJ 8.8 8.2* 8.6   GLC 86 107* 122*       ASSESSMENT/PLAN:  Cerebrovascular accident  (CVA), unspecified mechanism (H)  Left-sided weakness  Physical deconditioning  Acute on chronic issues, little improvement in weakness. Continues therapies - f/u with progress next week. Meds and cards f/u as above.     Pneumonia of left lower lobe due to infectious organism (H)  BRAD (obstructive sleep apnea)  Chronic obstructive pulmonary disease, unspecified COPD type (H)  Acute on chronic, remains congested and on o2. Add DuoNebs, incentive spirometry and attempt to wean off oxygen. Monitor. Does not use CPAP per self report.     Essential hypertension  Chronic, well managed. Vs, wt and meds as ordered. F/U next visit.     Depression, unspecified depression type  By history, meds as PTA and in house psych f/u as recommended.     Benign prostatic hyperplasia with incomplete bladder emptying  Ongoing issue, occasional retention. Increase Flomax dose, continue PRN straight catheter and f/u next week     Morbid obesity, unspecified obesity type (H)  Type 2 diabetes mellitus without complication, without long-term current use of insulin (H)  Chronic issues, weight down and BG not routinely checked (diet controlled). Encourage healthy dietary choices, monitor weight per routine.     Orders written by provider at facility  1. IS ten reps QID  2. Wean off o2 as able  3. Trim toenails or add to podiatry list  4. DuoNebs 1 neb QID x 5 days diagnosis pneumonia  5. Increase Flomax to 0.8 mg PO daily diagnosis urinary retention.     Spent total unit/floor time of 37 minutes consisted of the following: Examination of patient, reviewing the record including pertinent labs and imaging, placing orders, and completing documentation.  More than 50% of this time (20 minutes) was spent in coordination of care with the patient, nursing staff and other healthcare providers.   This time was spent on discussing the care plan including review of current status and hospital course and discharge recommendations, medications, discharge/safe  placement, specialty follow up need.      Time with patient/family included: Discussion of diagnostic results, diagnosis and prognosis, overall goal and disposition plan.      Medical decision making and discussions included:   Risks/benefits of treating urinary retention with catheterization vs meds, needed monitoring.   Risks/benefits of pneumonia and COPD with nebs, IS, wean off o2  PT/OT progress and safe mobility in current state  Diabetes management plan to include dietary management, no meds  HTN management plan to include medications and monitoring   COPD management plan to include nebs, oxygen as needed.   Anticoagulation ordered for recent CVA treatment    Rehab management plan discussion included-  Follow up needed with card, neurology and ?urology if retention persists  Medication changes to include Flomax increase and duo neb addition    Time on communication of care included:  Updated RN on new orders     Electronically signed by:  ELIZABETH Ayala CNP

## 2020-01-31 RX ORDER — MECLIZINE HYDROCHLORIDE 25 MG/1
25 TABLET ORAL EVERY 6 HOURS PRN
COMMUNITY
End: 2020-03-12

## 2020-01-31 NOTE — PROGRESS NOTES
[place] [facility]  PRIMARY CARE PROVIDER AND CLINIC:  Augustin Thomas MD, [address] / [place] [address]    Pt was seen by Dr Garland on 1/29/20 at the [facility] for an initial TCU visit    Pt  is a 79 year old  (1940), admitted to the above facility from  [facility]. Hospital stay 1/14/20 through 1/23/20..  Admitted to this facility for  rehab, medical management and nursing care.    Hospital course was reviewed by me, is as per the hospital discharge summary and NP note.    Pt is a 79 yr old male admitted to [facility] for rehabilitation s/p hospitalization [facility] 1/14/20-1/23/20 for the management of a posterior R basal ganglia.    Pt had presented to the hospital with L sided weakness and garbled speech. Was treated with Plavix for 3 weeks, higher dose ASA for 3 weeks then  mg daily.    No arrhythmia noted. Echo grossly unremarkable  History of past CVA with minimal residual, however, at baseline, Pt is non-ambulatory secondary to chronic back and knee pain.    Hospital course complicated by L LL pneumonia, urinary retention with Cardona placed during hospitalization.    History of COPD, not normally on 02, BRAD, intolerant of CPAP.      Pt notes continued L sided weakness.  He notes an occ non-productive cough, continues to use 02  Cardona is out, he is requiring daily st cath  He denies chest pain, cough, HA, SOB, abd pain, dysuria        CODE STATUS/ADVANCE DIRECTIVES DISCUSSION:   CPR/Full code   Patient's living condition: lives with spouse  ALLERGIES: Patient has no known allergies.  PAST MEDICAL HISTORY:  has a past medical history of BPH (benign prostatic hyperplasia), Cataract, Cholelithiasis, COPD (chronic obstructive pulmonary disease) (H), Depression, Diabetes mellitus, Dyshidrotic foot dermatitis, Edema, Gout, Hyperlipidemia, Hypertension, Kidney stones, Lumbago, Lumbar disc displacement without myelopathy, Muscle  weakness, Neuropathy, diabetic (H), Obesity, Spinal stenosis, Stroke (H), Unsteady gait, Urinary retention with incomplete bladder emptying, and UTI (urinary tract infection). He also has no past medical history of Asymptomatic human immunodeficiency virus (HIV) infection status (H), Blood in semen, Cerebral palsy (H), Complication of anesthesia, Congenital renal agenesis and dysgenesis, Difficult intubation, Epididymitis, bilateral, Epididymitis, left, Epididymitis, right, Goiter, Hernia, abdominal, History of spinal cord injury, History of thrombophlebitis, Horseshoe kidney, Hydrocephalus (H), Malignant hyperthermia, Mumps, Orchitis, epididymitis, and epididymo-orchitis, with abscess, Palpitations, Parkinsons disease (H), Penile discharge, PONV (postoperative nausea and vomiting), Prostate infection, Spider veins, Spina bifida (H), Spinal headache, STD (sexually transmitted disease), Swelling of testicle, Tethered cord (H), or Tuberculosis.  PAST SURGICAL HISTORY:   has a past surgical history that includes Laminectomy lumbar one level; joint replacement (Right); knee surgery (Bilateral); Appendectomy open; Tonsillectomy; Arthroscopy shoulder rotator cuff repair; cataracts (Bilateral); Cystoscopy (10/19/2011); Cholecystectomy; Eye surgery; colonoscopy; Cystoscopy, transurethral resection (TUR) prostate, combined (N/A, 2/21/2018); and Electrophysiology Loop Recorder Implant (N/A, 1/20/2020).  FAMILY HISTORY: family history includes Prostate Cancer in his father.  SOCIAL HISTORY:   reports that he has quit smoking. He has never used smokeless tobacco. He reports that he does not drink alcohol or use drugs.      Current Outpatient Medications   Medication Sig Dispense Refill     albuterol (2.5 MG/3ML) 0.083% neb solution Take 1 vial (2.5 mg) by nebulization every 2 hours as needed for shortness of breath / dyspnea or other (dyspnea) 360 mL 0     ALLOPURINOL PO Take 300 mg by mouth daily       amoxicillin-clavulanate  (AUGMENTIN) 875-125 MG tablet Take 1 tablet by mouth 2 times daily for 7 days       [START ON 2/7/2020] aspirin (ASA) 325 MG EC tablet Take 1 tablet (325 mg) by mouth daily       aspirin (ASA) 81 MG EC tablet Take 1 tablet (81 mg) by mouth daily for 17 days 17 tablet 0     atorvastatin (LIPITOR) 10 MG tablet Take 1 tablet (10 mg) by mouth every evening       benzocaine-menthol (CHLORASEPTIC) 6-10 MG lozenge Place 1 lozenge inside cheek every hour as needed for sore throat (dry/sore throat without fever)       clopidogrel (PLAVIX) 75 MG tablet Take 1 tablet (75 mg) by mouth daily for 17 days 17 tablet 0     Emollient (AMLACTIN ULTRA EX) Externally apply topically daily APPLY TO FEET        metoprolol tartrate (LOPRESSOR) 25 MG tablet Take 1 tablet (25 mg) by mouth 2 times daily       PARoxetine HCl (PAXIL PO) Take 20 mg by mouth daily        tamsulosin (FLOMAX) 0.4 MG capsule Take 1 capsule (0.4 mg) by mouth every morning (Patient taking differently: Take 0.8 mg by mouth every morning ) 14 capsule 0       ROS:  10 point ROS neg except as noted above.        Exam:  GENERAL APPEARANCE:  Alert, in no distress, lying in bed, wearing 02  ENT: oral mucosa moist  EYES: no eye redness or drainage  NECK:  supple  RESP: Lungs clear  CV: RRR, no M  ABDOMEN: soft, protuberant, non-tender  M/S:  No edema  SKIN:  No rash  NEURO:  Alert, fully oriented, pleasant. Face symmetric  Minimal movement L side. Speech is fluent          ASSESSMENT/PLAN:    Acute CVA, with residual L sided weakness  History of past CVA, with no previous residual  Plan 3 week course of Plavix, low dose ASA, then full dose ASA. Therapies. Loop monitor  Neurology f/u      Left lower lobe pneumonia  COPD  Clinically stable  Pt is completing course of Augmentin  Plan monitor resp status, nebs, wean off 02 as possible    BRAD  Intolerant of CPAP    Hypertension  Controlled  Plan monitor BP on Metoprolol    Depression  Stable on paroxetine  Plan monitor mental  status, house psychologist       BPH  Pt required Cardona cath during hospitalization  Now with need for intermittent st cath  Plan monitor bladder function, continue Flomax, assure adequate bowel function.        Nic Garland MD

## 2020-02-05 ENCOUNTER — NURSING HOME VISIT (OUTPATIENT)
Dept: GERIATRICS | Facility: CLINIC | Age: 80
End: 2020-02-05
Payer: COMMERCIAL

## 2020-02-05 VITALS
OXYGEN SATURATION: 95 % | WEIGHT: 264.7 LBS | SYSTOLIC BLOOD PRESSURE: 141 MMHG | DIASTOLIC BLOOD PRESSURE: 77 MMHG | HEART RATE: 70 BPM | TEMPERATURE: 98.1 F | HEIGHT: 72 IN | RESPIRATION RATE: 18 BRPM | BODY MASS INDEX: 35.85 KG/M2

## 2020-02-05 DIAGNOSIS — R53.81 PHYSICAL DECONDITIONING: ICD-10-CM

## 2020-02-05 DIAGNOSIS — G47.33 OSA (OBSTRUCTIVE SLEEP APNEA): ICD-10-CM

## 2020-02-05 DIAGNOSIS — R39.14 BENIGN PROSTATIC HYPERPLASIA WITH INCOMPLETE BLADDER EMPTYING: ICD-10-CM

## 2020-02-05 DIAGNOSIS — I69.391 DYSPHAGIA DUE TO RECENT CEREBROVASCULAR ACCIDENT (CVA): ICD-10-CM

## 2020-02-05 DIAGNOSIS — J44.9 CHRONIC OBSTRUCTIVE PULMONARY DISEASE, UNSPECIFIED COPD TYPE (H): ICD-10-CM

## 2020-02-05 DIAGNOSIS — N40.1 BENIGN PROSTATIC HYPERPLASIA WITH INCOMPLETE BLADDER EMPTYING: ICD-10-CM

## 2020-02-05 DIAGNOSIS — J96.01 ACUTE RESPIRATORY FAILURE WITH HYPOXIA (H): ICD-10-CM

## 2020-02-05 DIAGNOSIS — I69.354 HEMIPARESIS AFFECTING LEFT SIDE AS LATE EFFECT OF CEREBROVASCULAR ACCIDENT (CVA) (H): Primary | ICD-10-CM

## 2020-02-05 PROCEDURE — 99309 SBSQ NF CARE MODERATE MDM 30: CPT | Performed by: NURSE PRACTITIONER

## 2020-02-05 RX ORDER — TAMSULOSIN HYDROCHLORIDE 0.4 MG/1
0.8 CAPSULE ORAL DAILY
COMMUNITY
End: 2021-02-08

## 2020-02-05 ASSESSMENT — MIFFLIN-ST. JEOR: SCORE: 1963.67

## 2020-02-05 NOTE — PROGRESS NOTES
[place] [facility]    [place] Medical Record Number:  [id number]  Place of Service where encounter took place:  [facility] [place] [facility] [[id number]]  Chief Complaint   Patient presents with     Nursing Home Acute       HPI:    Ron Gilmore  is a 79 year old (1940), who is being seen today for an episodic care visit.  HPI information obtained from: facility chart records, facility staff, patient report and [facility] chart review.     Patient admitted to [facility] 1/14-1/23 due to LUE weakness, garbled speech. Was diagnosed with CVA, started on plavix x 3 weeks w/ASA x 3 weeks; followed by ASA 325mg every day. Implantable loop recorder placed on 1/20. Hx prior CVA 2006 with minimal residual deficits including LLE weakness and dysphagia. PTA nonambulatory, uses power wheelchair secondary to back surgery and knee problems.   Also noted to have LLL pneumonia, completed course of Augmentin.   Patient then [facility].       Today's concern is:    During exam, patient seen resting in bed. Patient reports ongoing left-sided weakness w/limited mobility. Has been participating in therapy, reports using a slide board for transfers. States PTA lives w/spouse in condo, reports no stairs at home. Admits to good appetite. Does endorse intermittent constipation, has recently used senna-s prn. Denies issues voiding or bladder pressure, reports RN staff have been checking PVR. Continues to use supplemental O2, on 1L currently. Denies cough, wheezing or fevers. Denies chest pain, SOB, headache, syncope.           Past Medical and Surgical History reviewed in Epic today.    MEDICATIONS:  Current Outpatient Medications   Medication Sig Dispense Refill     albuterol (2.5 MG/3ML) 0.083% neb solution Take 1 vial (2.5 mg) by nebulization every 2 hours as needed for shortness of breath / dyspnea or other (dyspnea) 360 mL 0     ALLOPURINOL PO Take 300 mg by mouth daily       [START ON  2/7/2020] aspirin (ASA) 325 MG EC tablet Take 1 tablet (325 mg) by mouth daily (Patient taking differently: Take 325 mg by mouth daily Begins 2/11)       aspirin (ASA) 81 MG EC tablet Take 1 tablet (81 mg) by mouth daily for 17 days 17 tablet 0     atorvastatin (LIPITOR) 10 MG tablet Take 1 tablet (10 mg) by mouth every evening       benzocaine-menthol (CHLORASEPTIC) 6-10 MG lozenge Place 1 lozenge inside cheek every hour as needed for sore throat (dry/sore throat without fever)       clopidogrel (PLAVIX) 75 MG tablet Take 1 tablet (75 mg) by mouth daily for 17 days 17 tablet 0     Emollient (AMLACTIN ULTRA EX) Externally apply topically daily APPLY TO FEET        meclizine (ANTIVERT) 25 MG tablet Take 25 mg by mouth every 6 hours as needed for dizziness       metoprolol tartrate (LOPRESSOR) 25 MG tablet Take 1 tablet (25 mg) by mouth 2 times daily       PARoxetine HCl (PAXIL PO) Take 20 mg by mouth daily        tamsulosin (FLOMAX) 0.4 MG capsule Take 0.8 mg by mouth daily             REVIEW OF SYSTEMS:  4 point ROS including Respiratory, CV, GI and , other than that noted in the HPI,  is negative      Objective:  BP (!) 141/77   Pulse 70   Temp 98.1  F (36.7  C)   Resp 18   Ht 1.829 m (6')   Wt 120.1 kg (264 lb 11.2 oz)   SpO2 95%   BMI 35.90 kg/m    Exam:  GENERAL APPEARANCE:  Alert, in no distress, appears healthy, oriented, cooperative  RESP:  respiratory effort and palpation of chest normal, lungs clear to auscultation , no respiratory distress, diminished breath sounds at bases  CV:  Palpation and auscultation of heart done , regular rate and rhythm, no murmur, rub, or gallop, no edema, +2 pedal pulses  ABDOMEN:  normal bowel sounds, soft, nontender,  no guarding or rebound  M/S:   Gait and station abnormal, primarily wheelchair bound. Digits and nails normal  SKIN:  Inspection of skin and subcutaneous tissue baseline, Palpation of skin and subcutaneous tissue baseline  NEURO:   Cranial nerves 2-12  are normal tested and grossly at patient's baseline, Examination of sensation by touch normal  PSYCH:  oriented X 3, memory impaired , affect and mood normal      Labs:   Labs done in SNF are in [place] EPIC. Please refer to them using EPIC/Care Everywhere., Recent labs in EPIC reviewed by me today.  and   Most Recent 3 CBC's:  Recent Labs   Lab Test 01/22/20  0838 01/21/20  0751 01/14/20  2356   WBC 5.8 11.6* 8.7   HGB 12.4* 13.6 13.4   MCV 94 93 93    176 184     Most Recent 3 BMP's:  Recent Labs   Lab Test 01/27/20  0602 01/22/20  0838 01/21/20  0751    135 138   POTASSIUM 3.9 3.4 3.6   CHLORIDE 97 98 97   CO2 35* 35* 40*   BUN 12 24 16   CR 0.78 0.79 0.76   ANIONGAP 4 2* 1*   RAJ 8.8 8.2* 8.6   GLC 86 107* 122*             ASSESSMENT/PLAN:    (I69.354) Hemiparesis affecting left side as late effect of cerebrovascular accident (CVA) (H)  (primary encounter diagnosis)  (I69.391) Dysphagia due to recent cerebrovascular accident (CVA)  Comment: Left sided hemiparesis and dysphagia r/t CVA. Implantable loop recorder placed on 1/20. Hx prior CVA 2006 with minimal residual deficits including LLE weakness and dysphagia.  Plan:   - Continue ASA 81mg every day and plavix through 2/9, then start ASA 325mg every day on 2/10  - Recently was upgraded to DD3 diet, monitor respiratory status  - Check [facility] 2/6  - Patient to follow-up with Neurologist as directed  - ST following for dysphagia  - [facility] with  [facility]    (J44.9) Chronic obstructive pulmonary disease, unspecified COPD type (H)  (J96.01) Acute respiratory failure with hypoxia (H)  (G47.33) BRAD (obstructive sleep apnea)  Comment: Acute respiratory failure r/t recent hx pneumonia and chronic COPD. Chronic BRAD. PTA not on supplemental O2, since hospitalization has required 1-2L O2.   Plan:   - Continue to wean O2 as tolerated, keep O2 > 90%  - Continue CPAP at night  - Restart duonebs TID x 7 days    (N40.1,  R39.14) Benign  prostatic hyperplasia with incomplete bladder emptying  Comment: Hx BPH w/hx recurrent UTI. Required chirinos during recent hospital stay, dc'd last week. RN reports PVR checks < 300cc.   Plan:   - Continue PVR checks  - Continue flomax  - Patient to follow-up with Urologist as directed    (R53.81) Physical deconditioning  Comment: Ongoing physical deconditioning r/t left-sided hemiparesis associated with CVA  Plan:   - Encourage active participation in therapy session to increase strength and promote independence in activities and ADLs.   - Cognitive testing to be done in therapy.   - SW following for discharge planning. Patient states goal is to discharge home w/spouse.               Electronically signed by:  ELIZABETH Newsome CNP

## 2020-02-06 ENCOUNTER — HOSPITAL LABORATORY (OUTPATIENT)
Dept: OTHER | Facility: CLINIC | Age: 80
End: 2020-02-06

## 2020-02-06 LAB
ALBUMIN UR-MCNC: >600 MG/DL
ANION GAP SERPL CALCULATED.3IONS-SCNC: 2 MMOL/L (ref 3–14)
ANION GAP SERPL CALCULATED.3IONS-SCNC: 4 MMOL/L (ref 3–14)
APPEARANCE UR: ABNORMAL
BILIRUB UR QL STRIP: NEGATIVE
BUN SERPL-MCNC: 12 MG/DL (ref 7–30)
BUN SERPL-MCNC: 23 MG/DL (ref 7–30)
CALCIUM SERPL-MCNC: 8.5 MG/DL (ref 8.5–10.1)
CALCIUM SERPL-MCNC: 8.8 MG/DL (ref 8.5–10.1)
CHLORIDE SERPL-SCNC: 105 MMOL/L (ref 94–109)
CHLORIDE SERPL-SCNC: 97 MMOL/L (ref 94–109)
CO2 SERPL-SCNC: 31 MMOL/L (ref 20–32)
CO2 SERPL-SCNC: 35 MMOL/L (ref 20–32)
COLOR UR AUTO: YELLOW
CREAT SERPL-MCNC: 0.69 MG/DL (ref 0.66–1.25)
CREAT SERPL-MCNC: 0.78 MG/DL (ref 0.66–1.25)
ERYTHROCYTE [DISTWIDTH] IN BLOOD BY AUTOMATED COUNT: 15.4 % (ref 10–15)
GFR SERPL CREATININE-BSD FRML MDRD: 73 ML/MIN/{1.73_M2}
GFR SERPL CREATININE-BSD FRML MDRD: >90 ML/MIN/{1.73_M2}
GLUCOSE SERPL-MCNC: 86 MG/DL (ref 70–99)
GLUCOSE SERPL-MCNC: 93 MG/DL (ref 70–99)
GLUCOSE UR STRIP-MCNC: NEGATIVE MG/DL
HCT VFR BLD AUTO: 43.7 % (ref 40–53)
HGB BLD-MCNC: 13.1 G/DL (ref 13.3–17.7)
HGB UR QL STRIP: NEGATIVE
KETONES UR STRIP-MCNC: NEGATIVE MG/DL
LEUKOCYTE ESTERASE UR QL STRIP: ABNORMAL
MCH RBC QN AUTO: 28.2 PG (ref 26.5–33)
MCHC RBC AUTO-ENTMCNC: 30 G/DL (ref 31.5–36.5)
MCV RBC AUTO: 94 FL (ref 78–100)
MUCOUS THREADS #/AREA URNS LPF: PRESENT /LPF
NITRATE UR QL: POSITIVE
PH UR STRIP: 8 PH (ref 5–7)
PLATELET # BLD AUTO: 182 10E9/L (ref 150–450)
POTASSIUM SERPL-SCNC: 3.9 MMOL/L (ref 3.4–5.3)
POTASSIUM SERPL-SCNC: 4.4 MMOL/L (ref 3.4–5.3)
RBC # BLD AUTO: 4.65 10E12/L (ref 4.4–5.9)
RBC #/AREA URNS AUTO: 9 /HPF (ref 0–2)
SODIUM SERPL-SCNC: 136 MMOL/L (ref 133–144)
SODIUM SERPL-SCNC: 138 MMOL/L (ref 133–144)
SOURCE: ABNORMAL
SP GR UR STRIP: 1.03 (ref 1–1.03)
SQUAMOUS #/AREA URNS AUTO: 1 /HPF (ref 0–1)
UROBILINOGEN UR STRIP-MCNC: NORMAL MG/DL (ref 0–2)
WBC # BLD AUTO: 9.3 10E9/L (ref 4–11)
WBC #/AREA URNS AUTO: 34 /HPF (ref 0–5)

## 2020-02-07 ENCOUNTER — NURSING HOME VISIT (OUTPATIENT)
Dept: GERIATRICS | Facility: CLINIC | Age: 80
End: 2020-02-07
Payer: COMMERCIAL

## 2020-02-07 VITALS
RESPIRATION RATE: 18 BRPM | OXYGEN SATURATION: 92 % | WEIGHT: 264.8 LBS | BODY MASS INDEX: 35.87 KG/M2 | HEART RATE: 100 BPM | HEIGHT: 72 IN | SYSTOLIC BLOOD PRESSURE: 103 MMHG | DIASTOLIC BLOOD PRESSURE: 75 MMHG | TEMPERATURE: 98.4 F

## 2020-02-07 DIAGNOSIS — R39.14 BENIGN PROSTATIC HYPERPLASIA WITH INCOMPLETE BLADDER EMPTYING: ICD-10-CM

## 2020-02-07 DIAGNOSIS — N40.1 BENIGN PROSTATIC HYPERPLASIA WITH INCOMPLETE BLADDER EMPTYING: ICD-10-CM

## 2020-02-07 DIAGNOSIS — N30.00 ACUTE CYSTITIS WITHOUT HEMATURIA: Primary | ICD-10-CM

## 2020-02-07 PROCEDURE — 99309 SBSQ NF CARE MODERATE MDM 30: CPT | Performed by: NURSE PRACTITIONER

## 2020-02-07 RX ORDER — IPRATROPIUM BROMIDE AND ALBUTEROL SULFATE 2.5; .5 MG/3ML; MG/3ML
1 SOLUTION RESPIRATORY (INHALATION) 3 TIMES DAILY
COMMUNITY
Start: 2020-02-05 | End: 2020-02-12

## 2020-02-07 ASSESSMENT — MIFFLIN-ST. JEOR: SCORE: 1964.12

## 2020-02-07 NOTE — PROGRESS NOTES
[place] [facility]    [place] Medical Record Number:  [id number]  Place of Service where encounter took place:  [facility] ([facility]) [[id number]]  Chief Complaint   Patient presents with     RECHECK       HPI:    Ron Gilmore  is a 79 year old (1940), who is being seen today for an episodic care visit.  HPI information obtained from: facility chart records, facility staff, patient report and [facility] chart review.      Patient admitted to [facility] 1/14-1/23 due to LUE weakness, garbled speech. Was diagnosed with CVA, started on plavix x 3 weeks w/ASA x 3 weeks; followed by ASA 325mg every day. Implantable loop recorder placed on 1/20. Hx prior CVA 2006 with minimal residual deficits including LLE weakness and dysphagia. PTA nonambulatory, uses power wheelchair secondary to back surgery and knee problems.   Also noted to have LLL pneumonia, completed course of Augmentin.   Patient then transferred to [facility] on 1/23.       Today's concern is:    During exam, patient seen resting in bed. Does report dysuria, urinary frequency. Denies abdominal pain, fevers, chills.           Past Medical and Surgical History reviewed in Epic today.    MEDICATIONS:  Current Outpatient Medications   Medication Sig Dispense Refill     albuterol (2.5 MG/3ML) 0.083% neb solution Take 1 vial (2.5 mg) by nebulization every 2 hours as needed for shortness of breath / dyspnea or other (dyspnea) 360 mL 0     ALLOPURINOL PO Take 300 mg by mouth daily       aspirin (ASA) 325 MG EC tablet Take 1 tablet (325 mg) by mouth daily (Patient taking differently: Take 325 mg by mouth daily Begins 2/11)       aspirin (ASA) 81 MG EC tablet Take 1 tablet (81 mg) by mouth daily for 17 days 17 tablet 0     atorvastatin (LIPITOR) 10 MG tablet Take 1 tablet (10 mg) by mouth every evening       benzocaine-menthol (CHLORASEPTIC) 6-10 MG lozenge Place 1 lozenge inside cheek every hour as needed for sore throat (dry/sore throat  without fever)       clopidogrel (PLAVIX) 75 MG tablet Take 1 tablet (75 mg) by mouth daily for 17 days 17 tablet 0     Emollient (AMLACTIN ULTRA EX) Externally apply topically daily APPLY TO FEET        ipratropium - albuterol 0.5 mg/2.5 mg/3 mL (DUONEB) 0.5-2.5 (3) MG/3ML neb solution Take 1 vial by nebulization 3 times daily       meclizine (ANTIVERT) 25 MG tablet Take 25 mg by mouth every 6 hours as needed for dizziness       metoprolol tartrate (LOPRESSOR) 25 MG tablet Take 1 tablet (25 mg) by mouth 2 times daily       PARoxetine HCl (PAXIL PO) Take 20 mg by mouth daily        tamsulosin (FLOMAX) 0.4 MG capsule Take 0.8 mg by mouth daily             REVIEW OF SYSTEMS:  4 point ROS including Respiratory, CV, GI and , other than that noted in the HPI,  is negative      Objective:  /75   Pulse 100   Temp 98.4  F (36.9  C)   Resp 18   Ht 1.829 m (6')   Wt 120.1 kg (264 lb 12.8 oz)   SpO2 92%   BMI 35.91 kg/m    Exam:   GENERAL APPEARANCE:  Alert, in no distress, appears healthy, oriented, cooperative  RESP:  respiratory effort and palpation of chest normal, lungs clear to auscultation , no respiratory distress, diminished breath sounds at bases  CV:  Palpation and auscultation of heart done , regular rate and rhythm, no murmur, rub, or gallop, no edema, +2 pedal pulses  ABDOMEN:  normal bowel sounds, soft, nontender,  no guarding or rebound  M/S:   Gait and station abnormal, primarily wheelchair bound. Digits and nails normal  SKIN:  Inspection of skin and subcutaneous tissue baseline, Palpation of skin and subcutaneous tissue baseline  NEURO:   Cranial nerves 2-12 are normal tested and grossly at patient's baseline, Examination of sensation by touch normal  PSYCH:  oriented X 3, memory impaired , affect and mood normal      Labs:   Labs done in SNF are in [place] EPIC. Please refer to them using EPIC/Care Everywhere., Recent labs in EPIC reviewed by me today.  and Most Recent 3 CBC's:  Recent Labs    Lab Test 01/27/20  0602 01/22/20  0838 01/21/20  0751   WBC 9.3 5.8 11.6*   HGB 13.1* 12.4* 13.6   MCV 94 94 93    157 176     Most Recent 3 BMP's:  Recent Labs   Lab Test 02/06/20  0530 01/27/20  0602 01/22/20  0838    136 135   POTASSIUM 4.4 3.9 3.4   CHLORIDE 105 97 98   CO2 31 35* 35*   BUN 23 12 24   CR 0.69 0.78 0.79   ANIONGAP 2* 4 2*   RAJ 8.5 8.8 8.2*   GLC 93 86 107*           ASSESSMENT/PLAN:    (N30.00) Acute cystitis without hematuria  (primary encounter diagnosis)  (N40.1,  R39.14) Benign prostatic hyperplasia with incomplete bladder emptying  Comment: Acute UTI. Chronic BPH w/hx recurrent UTI. Required chirinos during recent hospital stay, dc'd last week. RN reports PVR checks < 300cc.   Plan:   - Add ceftin 250mg BID x 7 days   - Check BMP 2/12  - Monitor PVR checks  - Continue flomax  - Patient to follow-up with Urologist as directed            Electronically signed by:  ELIZABETH Newsome CNP

## 2020-02-08 LAB
BACTERIA SPEC CULT: ABNORMAL
BACTERIA SPEC CULT: ABNORMAL
Lab: ABNORMAL
SPECIMEN SOURCE: ABNORMAL

## 2020-02-11 ENCOUNTER — AMBULATORY - HEALTHEAST (OUTPATIENT)
Dept: OTHER | Facility: CLINIC | Age: 80
End: 2020-02-11

## 2020-02-11 ENCOUNTER — DOCUMENTATION ONLY (OUTPATIENT)
Dept: OTHER | Facility: CLINIC | Age: 80
End: 2020-02-11

## 2020-02-12 ENCOUNTER — HOSPITAL LABORATORY (OUTPATIENT)
Dept: OTHER | Facility: CLINIC | Age: 80
End: 2020-02-12

## 2020-02-12 ENCOUNTER — NURSING HOME VISIT (OUTPATIENT)
Dept: GERIATRICS | Facility: CLINIC | Age: 80
End: 2020-02-12
Payer: COMMERCIAL

## 2020-02-12 VITALS
RESPIRATION RATE: 18 BRPM | TEMPERATURE: 97.4 F | BODY MASS INDEX: 35.78 KG/M2 | HEART RATE: 76 BPM | SYSTOLIC BLOOD PRESSURE: 143 MMHG | DIASTOLIC BLOOD PRESSURE: 80 MMHG | OXYGEN SATURATION: 92 % | WEIGHT: 264.2 LBS | HEIGHT: 72 IN

## 2020-02-12 DIAGNOSIS — J96.01 ACUTE RESPIRATORY FAILURE WITH HYPOXIA (H): ICD-10-CM

## 2020-02-12 DIAGNOSIS — I69.391 DYSPHAGIA DUE TO RECENT CEREBROVASCULAR ACCIDENT (CVA): ICD-10-CM

## 2020-02-12 DIAGNOSIS — R39.14 BENIGN PROSTATIC HYPERPLASIA WITH INCOMPLETE BLADDER EMPTYING: ICD-10-CM

## 2020-02-12 DIAGNOSIS — J44.9 CHRONIC OBSTRUCTIVE PULMONARY DISEASE, UNSPECIFIED COPD TYPE (H): ICD-10-CM

## 2020-02-12 DIAGNOSIS — N30.00 ACUTE CYSTITIS WITHOUT HEMATURIA: Primary | ICD-10-CM

## 2020-02-12 DIAGNOSIS — R53.81 PHYSICAL DECONDITIONING: ICD-10-CM

## 2020-02-12 DIAGNOSIS — I69.354 HEMIPARESIS AFFECTING LEFT SIDE AS LATE EFFECT OF CEREBROVASCULAR ACCIDENT (CVA) (H): ICD-10-CM

## 2020-02-12 DIAGNOSIS — N40.1 BENIGN PROSTATIC HYPERPLASIA WITH INCOMPLETE BLADDER EMPTYING: ICD-10-CM

## 2020-02-12 LAB
ANION GAP SERPL CALCULATED.3IONS-SCNC: 4 MMOL/L (ref 3–14)
BUN SERPL-MCNC: 17 MG/DL (ref 7–30)
CALCIUM SERPL-MCNC: 8.8 MG/DL (ref 8.5–10.1)
CHLORIDE SERPL-SCNC: 101 MMOL/L (ref 94–109)
CO2 SERPL-SCNC: 30 MMOL/L (ref 20–32)
CREAT SERPL-MCNC: 0.69 MG/DL (ref 0.66–1.25)
GFR SERPL CREATININE-BSD FRML MDRD: >90 ML/MIN/{1.73_M2}
GLUCOSE SERPL-MCNC: 100 MG/DL (ref 70–99)
POTASSIUM SERPL-SCNC: 3.9 MMOL/L (ref 3.4–5.3)
SODIUM SERPL-SCNC: 135 MMOL/L (ref 133–144)

## 2020-02-12 PROCEDURE — 99309 SBSQ NF CARE MODERATE MDM 30: CPT | Performed by: NURSE PRACTITIONER

## 2020-02-12 RX ORDER — CEFUROXIME AXETIL 250 MG/1
250 TABLET ORAL 2 TIMES DAILY
COMMUNITY
Start: 2020-02-07 | End: 2020-02-19

## 2020-02-12 ASSESSMENT — MIFFLIN-ST. JEOR: SCORE: 1961.4

## 2020-02-12 NOTE — PROGRESS NOTES
[place] [facility]    [place] Medical Record Number:  [id number]  Place of Service where encounter took place:  [facility] ([facility]) [[id number]]  Chief Complaint   Patient presents with     Nursing Home Acute       HPI:    Ron Gilmore  is a 79 year old (1940), who is being seen today for an episodic care visit.  HPI information obtained from: facility chart records, facility staff, patient report and [facility] chart review.     Patient admitted to [facility] 1/14-1/23 due to LUE weakness, garbled speech. Was diagnosed with CVA, started on plavix x 3 weeks w/ASA x 3 weeks; followed by ASA 325mg every day. Implantable loop recorder placed on 1/20. Hx prior CVA 2006 with minimal residual deficits including LLE weakness and dysphagia. PTA nonambulatory, uses power wheelchair secondary to back surgery and knee problems.   Also noted to have LLL pneumonia, completed course of Augmentin.   Patient then [facility] on 1/23.        Today's concern is:    During exam, patient seen resting in bed. Patient reports ongoing left-sided weakness w/limited mobility. Has been participating in therapy, reports using a slide board for transfers. States PTA lives w/spouse in condo, reports no stairs at home. States goal is to discharge home w/spouse. States since started antibiotic reports improvement in symptoms; but yesterday, did require chirinos placement due to urinary retention with PVR > 300cc. Also continues to require continuous O2 at 1-2L. Reports good appetite, continues to require DD3 diet w/honey thick liquids. ST working on swallow study. Denies cough, wheezing or fevers. Denies chest pain, SOB, headache, syncope.         Past Medical and Surgical History reviewed in Epic today.    MEDICATIONS:  Current Outpatient Medications   Medication Sig Dispense Refill     albuterol (2.5 MG/3ML) 0.083% neb solution Take 1 vial (2.5 mg) by nebulization every 2 hours as needed for shortness  of breath / dyspnea or other (dyspnea) 360 mL 0     ALLOPURINOL PO Take 300 mg by mouth daily       aspirin (ASA) 325 MG EC tablet Take 1 tablet (325 mg) by mouth daily       atorvastatin (LIPITOR) 10 MG tablet Take 1 tablet (10 mg) by mouth every evening       benzocaine-menthol (CHLORASEPTIC) 6-10 MG lozenge Place 1 lozenge inside cheek every hour as needed for sore throat (dry/sore throat without fever)       cefuroxime (CEFTIN) 250 MG tablet Take 250 mg by mouth 2 times daily       Emollient (AMLACTIN ULTRA EX) Externally apply topically daily APPLY TO FEET        meclizine (ANTIVERT) 25 MG tablet Take 25 mg by mouth every 6 hours as needed for dizziness       metoprolol tartrate (LOPRESSOR) 25 MG tablet Take 1 tablet (25 mg) by mouth 2 times daily       PARoxetine HCl (PAXIL PO) Take 20 mg by mouth daily        tamsulosin (FLOMAX) 0.4 MG capsule Take 0.8 mg by mouth daily       clopidogrel (PLAVIX) 75 MG tablet Take 1 tablet (75 mg) by mouth daily for 17 days 17 tablet 0     ipratropium - albuterol 0.5 mg/2.5 mg/3 mL (DUONEB) 0.5-2.5 (3) MG/3ML neb solution Take 1 vial by nebulization 3 times daily           REVIEW OF SYSTEMS:  4 point ROS including Respiratory, CV, GI and , other than that noted in the HPI,  is negative      Objective:  BP (!) 143/80   Pulse 76   Temp 97.4  F (36.3  C)   Resp 18   Ht 1.829 m (6')   Wt 119.8 kg (264 lb 3.2 oz)   SpO2 92%   BMI 35.83 kg/m    Exam:  GENERAL APPEARANCE:  Alert, in no distress, appears healthy, oriented, cooperative  RESP:  respiratory effort and palpation of chest normal, lungs clear to auscultation , no respiratory distress, diminished breath sounds at bases  CV:  Palpation and auscultation of heart done , regular rate and rhythm, no murmur, rub, or gallop, no edema, +2 pedal pulses  ABDOMEN:  normal bowel sounds, soft, nontender,  no guarding or rebound  : Cardona intact w/adequate output.   M/S:  L sided hemiparesis. Gait and station abnormal,  primarily wheelchair bound. Digits and nails normal  SKIN:  Inspection of skin and subcutaneous tissue baseline, Palpation of skin and subcutaneous tissue baseline  NEURO:   Cranial nerves 2-12 are normal tested and grossly at patient's baseline, Examination of sensation by touch normal  PSYCH:  oriented X 3, memory impaired , affect and mood normal      Labs:   Labs done in SNF are in [facility]. Please refer to them using EPIC/Care Everywhere., Recent labs in EPIC reviewed by me today.  and   Most Recent 3 CBC's:  Recent Labs   Lab Test 01/27/20  0602 01/22/20  0838 01/21/20  0751   WBC 9.3 5.8 11.6*   HGB 13.1* 12.4* 13.6   MCV 94 94 93    157 176     Most Recent 3 BMP's:  Recent Labs   Lab Test 02/12/20  0536 02/06/20  0530 01/27/20  0602    138 136   POTASSIUM 3.9 4.4 3.9   CHLORIDE 101 105 97   CO2 30 31 35*   BUN 17 23 12   CR 0.69 0.69 0.78   ANIONGAP 4 2* 4   RAJ 8.8 8.5 8.8   * 93 86         ASSESSMENT/PLAN:     (N30.00) Acute cystitis without hematuria  (primary encounter diagnosis)  (N40.1,  R39.14) Benign prostatic hyperplasia with incomplete bladder emptying  Comment: Acute UTI. Chronic BPH w/hx recurrent UTI. Required chirinos during recent hospital stay, failed voiding trial at TCU. UC 2/6+ 10,000 to 50,000 colonies/mL Enterococcus faecalis and >100,000 colonies/mL Proteus mirabilis. Requires chirinos due to recurrent urinary retention.   Plan:   - Course of Ceftin to be completed on 2/14  - Continue flomax  - Patient to follow-up with Urologist as directed    (I69.354) Hemiparesis affecting left side as late effect of cerebrovascular accident (CVA) (H)  (primary encounter diagnosis)  (I69.391) Dysphagia due to recent cerebrovascular accident (CVA)  Comment: Left sided hemiparesis and dysphagia r/t CVA. Implantable loop recorder placed on 1/20. Hx prior CVA 2006 with minimal residual deficits including LLE weakness and dysphagia. Requires DD3 diet w/honey thick liquids.  Plan:   -  Continue ASA 325mg every day   - Continue DD3 diet w/honey thick liquids  -  following for dysphagia, plan for swallow study  - Patient to follow-up with Neurologist as directed  - [facility]    (J44.9) Chronic obstructive pulmonary disease, unspecified COPD type (H)  (J96.01) Acute respiratory failure with hypoxia (H)  (G47.33) BRAD (obstructive sleep apnea)  Comment: Acute respiratory failure r/t recent hx pneumonia and chronic COPD. Chronic BRAD. PTA not on supplemental O2, since hospitalization has required 1-2L O2.   Plan:   - Continue to wean O2 as tolerated, keep O2 > 90%  - Continue CPAP at night  - RN to completed O2 testing for discharge planning, likely would need home O2    (R53.81) Physical deconditioning  Comment: Ongoing physical deconditioning r/t left-sided hemiparesis associated with CVA. Nonambulatory. Requires mod assist x 2 w/transfers, bed mobility, dressing, dependent w/toileting. SLUMS 24/30.   Plan:   - Encourage active participation in therapy session to increase strength and promote independence in activities and ADLs.   -  following for discharge planning. Patient states goal is to discharge home w/spouse.             Electronically signed by:  ELIZABETH Newsome CNP

## 2020-02-19 ENCOUNTER — NURSING HOME VISIT (OUTPATIENT)
Dept: GERIATRICS | Facility: CLINIC | Age: 80
End: 2020-02-19
Payer: COMMERCIAL

## 2020-02-19 VITALS
WEIGHT: 267.4 LBS | HEIGHT: 72 IN | RESPIRATION RATE: 18 BRPM | OXYGEN SATURATION: 93 % | SYSTOLIC BLOOD PRESSURE: 125 MMHG | TEMPERATURE: 97.8 F | DIASTOLIC BLOOD PRESSURE: 74 MMHG | BODY MASS INDEX: 36.22 KG/M2 | HEART RATE: 72 BPM

## 2020-02-19 DIAGNOSIS — F32.A DEPRESSION, UNSPECIFIED DEPRESSION TYPE: ICD-10-CM

## 2020-02-19 DIAGNOSIS — J96.01 ACUTE RESPIRATORY FAILURE WITH HYPOXIA (H): ICD-10-CM

## 2020-02-19 DIAGNOSIS — I69.354 HEMIPARESIS AFFECTING LEFT SIDE AS LATE EFFECT OF CEREBROVASCULAR ACCIDENT (CVA) (H): Primary | ICD-10-CM

## 2020-02-19 DIAGNOSIS — I69.391 DYSPHAGIA DUE TO RECENT CEREBROVASCULAR ACCIDENT (CVA): ICD-10-CM

## 2020-02-19 DIAGNOSIS — N40.1 BENIGN PROSTATIC HYPERPLASIA WITH INCOMPLETE BLADDER EMPTYING: ICD-10-CM

## 2020-02-19 DIAGNOSIS — J44.9 CHRONIC OBSTRUCTIVE PULMONARY DISEASE, UNSPECIFIED COPD TYPE (H): ICD-10-CM

## 2020-02-19 DIAGNOSIS — R39.14 BENIGN PROSTATIC HYPERPLASIA WITH INCOMPLETE BLADDER EMPTYING: ICD-10-CM

## 2020-02-19 DIAGNOSIS — R53.81 PHYSICAL DECONDITIONING: ICD-10-CM

## 2020-02-19 PROCEDURE — 99310 SBSQ NF CARE HIGH MDM 45: CPT | Performed by: NURSE PRACTITIONER

## 2020-02-19 RX ORDER — IPRATROPIUM BROMIDE AND ALBUTEROL SULFATE 2.5; .5 MG/3ML; MG/3ML
1 SOLUTION RESPIRATORY (INHALATION) EVERY 4 HOURS PRN
COMMUNITY
Start: 2020-02-19 | End: 2021-04-13

## 2020-02-19 ASSESSMENT — MIFFLIN-ST. JEOR: SCORE: 1975.92

## 2020-02-19 NOTE — PROGRESS NOTES
[place] [facility]    [place] Medical Record Number:  [id number]  Place of Service where encounter took place:  [facility] ([facility] [[id number]]  Chief Complaint   Patient presents with     Nursing Home Acute       HPI:    Ron Gilmore  is a 79 year old (1940), who is being seen today for an episodic care visit.  HPI information obtained from: facility chart records, facility staff, patient report and [facility] chart review.     Patient admitted to [facility] 1/14-1/23 due to LUE weakness, garbled speech. Was diagnosed with CVA, started on plavix x 3 weeks w/ASA x 3 weeks; followed by ASA 325mg every day. Implantable loop recorder placed on 1/20. Hx prior CVA 2006 with minimal residual deficits including LLE weakness and dysphagia. PTA nonambulatory, uses power wheelchair secondary to back surgery and knee problems.   Also noted to have LLL pneumonia, completed course of Augmentin.   Patient then transferred to [facility] on 1/23.       Today's concern is:    During exam, patient seen resting in bed. ST also at bedside, reports able to advance diet to regular w/nectar thick liquids following video swallow study on 2/14. Patient reports since diet advanced, has had no difficulty swallowing foods or liquids. Does endorse intermittent cough, states using duoneb prn w/relief. Continues to require 1-2L O2 continuously. Also endorses ongoing left-sided hemiparesis, has limited movement to LLE and no ability to move LUE. Patient reports feeling depressed mood since CVA and limited progress to left-sided hemiparesis; was hopeful he would gain more strength and movement to LLE/LUE at this time. Does have chirinos catheter and recently completed course of abx for UTI, reports suprapubic discomfort from chirinos. Noted to have tubing block urine flow which when adjusted patient had relief of sx. Denies wheezing, fevers, chest pain, SOB, headache, syncope. SW following for discharge planning. States  goal is to discharge home w/spouse.          Past Medical and Surgical History reviewed in Epic today.    MEDICATIONS:  Current Outpatient Medications   Medication Sig Dispense Refill     albuterol (2.5 MG/3ML) 0.083% neb solution Take 1 vial (2.5 mg) by nebulization every 2 hours as needed for shortness of breath / dyspnea or other (dyspnea) 360 mL 0     ALLOPURINOL PO Take 300 mg by mouth daily       aspirin (ASA) 325 MG EC tablet Take 1 tablet (325 mg) by mouth daily       atorvastatin (LIPITOR) 10 MG tablet Take 1 tablet (10 mg) by mouth every evening       benzocaine-menthol (CHLORASEPTIC) 6-10 MG lozenge Place 1 lozenge inside cheek every hour as needed for sore throat (dry/sore throat without fever)       Emollient (AMLACTIN ULTRA EX) Externally apply topically daily APPLY TO FEET        meclizine (ANTIVERT) 25 MG tablet Take 25 mg by mouth every 6 hours as needed for dizziness       metoprolol tartrate (LOPRESSOR) 25 MG tablet Take 1 tablet (25 mg) by mouth 2 times daily       PARoxetine HCl (PAXIL PO) Take 20 mg by mouth daily        tamsulosin (FLOMAX) 0.4 MG capsule Take 0.8 mg by mouth daily           REVIEW OF SYSTEMS:  4 point ROS including Respiratory, CV, GI and , other than that noted in the HPI,  is negative      Objective:  /74   Pulse 72   Temp 97.8  F (36.6  C)   Resp 18   Ht 1.829 m (6')   Wt 121.3 kg (267 lb 6.4 oz)   SpO2 93%   BMI 36.27 kg/m    Exam:  GENERAL APPEARANCE:  Alert, in no distress, appears healthy, oriented, cooperative  RESP:  respiratory effort and palpation of chest normal, lungs clear to auscultation , no respiratory distress, diminished breath sounds at bases  CV:  Palpation and auscultation of heart done , regular rate and rhythm, no murmur, rub, or gallop, no edema, +2 pedal pulses  ABDOMEN:  normal bowel sounds, soft, nontender,  no guarding or rebound  : Cardona intact w/adequate output, moderate amount of sediment.   M/S:   Gait and station abnormal,  primarily wheelchair bound. Digits and nails normal  SKIN:  Inspection of skin and subcutaneous tissue baseline, Palpation of skin and subcutaneous tissue baseline  NEURO:   Cranial nerves 2-12 are normal tested and grossly at patient's baseline, Examination of sensation by touch normal  PSYCH:  oriented X 3, memory impaired , affect and mood normal      Labs:   Labs done in SNF are in [facility]. Please refer to them using EPIC/Care Everywhere., Recent labs in EPIC reviewed by me today.  and   Most Recent 3 CBC's:  Recent Labs   Lab Test 01/27/20  0602 01/22/20  0838 01/21/20  0751   WBC 9.3 5.8 11.6*   HGB 13.1* 12.4* 13.6   MCV 94 94 93    157 176     Most Recent 3 BMP's:  Recent Labs   Lab Test 02/12/20  0536 02/06/20  0530 01/27/20  0602    138 136   POTASSIUM 3.9 4.4 3.9   CHLORIDE 101 105 97   CO2 30 31 35*   BUN 17 23 12   CR 0.69 0.69 0.78   ANIONGAP 4 2* 4   RAJ 8.8 8.5 8.8   * 93 86         ASSESSMENT/PLAN:    (I69.354) Hemiparesis affecting left side as late effect of cerebrovascular accident (CVA) (H)  (primary encounter diagnosis)  (I69.391) Dysphagia due to recent cerebrovascular accident (CVA)  Comment: Left sided hemiparesis and dysphagia r/t CVA. Implantable loop recorder placed on 1/20. Hx prior CVA 2006 with minimal residual deficits including LLE weakness and dysphagia. Diet advanced to regular w/nectar thick liquids following video swallow study on 2/14.  Plan:   - Continue ASA 325mg every day; continue lipitor, metoprolol  - ST following, recently increased diet to regular w/nectar thick liquids  - Patient to follow-up with Neurologist as directed  - Patient to follow-up with [facility] on 3/3  - Patient verbalizes understanding and agrees w/treatment plan     (N40.1,  R39.14) Benign prostatic hyperplasia with incomplete bladder emptying  Comment: Chronic BPH w/hx recurrent UTI. Required chirinos during recent hospital stay, failed voiding trial at TCU.  2/6+ 10,000  to 50,000 colonies/mL Enterococcus faecalis and >100,000 colonies/mL Proteus mirabilis; completed course of Ceftin on 2/14. Requires chirinos due to recurrent urinary retention.   Plan:   - RN to replace stat lock and tubing due to concern for tubing blockage and increased sediment   - Continue flomax  - Patient to follow-up with Urologist as directed  - Patient verbalizes understanding and agrees w/treatment plan      (J44.9) Chronic obstructive pulmonary disease, unspecified COPD type (H)  (J96.01) Acute respiratory failure with hypoxia (H)  (G47.33) BRAD (obstructive sleep apnea)  Comment: Acute respiratory failure r/t recent hx pneumonia and chronic COPD. Chronic BRAD. PTA not on supplemental O2, since hospitalization has required 1-2L O2.   Plan:   - Add scheduled duoneb TID, continue duoneb prn use  - Discontinue albuterol nebulizer   - Continue to wean O2 as tolerated, keep O2 > 90%. Patient will likely require home O2, discussed w/RN to complete O2 testing.   - Continue CPAP at night  - Patient verbalizes understanding and agrees w/treatment plan     (F32.9) Depression, unspecified depression type  Comment: Uncontrolled, patient reported depressed mood since CVA  Plan:   - House psychologist following  - Patient declined visit with  at this time  - Discussed paxil, patient states this has been helpful in the past but has been experiencing depressed mood lately. Plan to increase to paxil to 30mg every day   - Monitor changes in isma or behaviors  - Patient verbalizes understanding and agrees w/treatment plan    (R53.81) Physical deconditioning  Comment: Ongoing physical deconditioning r/t left-sided hemiparesis associated with CVA. Nonambulatory. Requires slide board transfers, bed mobility, dressing, dependent w/toileting. SLUMS 24/30.   Plan:   - Encourage active participation in therapy session to increase strength and promote independence in activities and ADLs.   - SW following for discharge  planning. Patient states goal is to discharge home w/spouse. Discussed with SW recommendations for home would be requiring different hospital bed/electric vs manual. Also would need home O2. Per SW, patient would qualify for increased PCA services at home to assist with ADLs.   - Patient verbalizes understanding and agrees w/treatment plan             Total time spent with patient visit at the [facility] nursing facility was 36 minutes including patient visit, review of past records and coordinating care w/RN, ST, and therapy. Greater than 50% of total time spent (21 minutes) with counseling patient regarding treatment plan, medication management and recommended increase to paxil and addition of scheduled duonebs; also reviewed recommendations for follow-up appointments and discharge planning. Patient verbalizes understanding and agrees with treatment plan, all questions and concerns were addressed. Coordinating care with RN regarding chirinos cares and O2 testing. Coordinating care with ST regarding changes to patient's diet. Coordinating care with SW regarding recommendations for discharge planning.    Electronically signed by:  ELIZABETH Newsome CNP

## 2020-02-26 ENCOUNTER — HOSPITAL LABORATORY (OUTPATIENT)
Dept: OTHER | Facility: CLINIC | Age: 80
End: 2020-02-26

## 2020-02-26 ENCOUNTER — NURSING HOME VISIT (OUTPATIENT)
Dept: GERIATRICS | Facility: CLINIC | Age: 80
End: 2020-02-26
Payer: COMMERCIAL

## 2020-02-26 VITALS
OXYGEN SATURATION: 94 % | RESPIRATION RATE: 18 BRPM | DIASTOLIC BLOOD PRESSURE: 78 MMHG | TEMPERATURE: 98.8 F | HEART RATE: 98 BPM | SYSTOLIC BLOOD PRESSURE: 122 MMHG

## 2020-02-26 DIAGNOSIS — A08.11 GASTROENTERITIS DUE TO NOROVIRUS: Primary | ICD-10-CM

## 2020-02-26 DIAGNOSIS — G47.33 OSA (OBSTRUCTIVE SLEEP APNEA): ICD-10-CM

## 2020-02-26 DIAGNOSIS — I69.391 DYSPHAGIA DUE TO RECENT CEREBROVASCULAR ACCIDENT (CVA): ICD-10-CM

## 2020-02-26 DIAGNOSIS — J44.9 CHRONIC OBSTRUCTIVE PULMONARY DISEASE, UNSPECIFIED COPD TYPE (H): ICD-10-CM

## 2020-02-26 DIAGNOSIS — R53.81 PHYSICAL DECONDITIONING: ICD-10-CM

## 2020-02-26 DIAGNOSIS — R39.14 BENIGN PROSTATIC HYPERPLASIA WITH INCOMPLETE BLADDER EMPTYING: ICD-10-CM

## 2020-02-26 DIAGNOSIS — N40.1 BENIGN PROSTATIC HYPERPLASIA WITH INCOMPLETE BLADDER EMPTYING: ICD-10-CM

## 2020-02-26 DIAGNOSIS — I69.354 HEMIPARESIS AFFECTING LEFT SIDE AS LATE EFFECT OF CEREBROVASCULAR ACCIDENT (CVA) (H): ICD-10-CM

## 2020-02-26 DIAGNOSIS — J96.01 ACUTE RESPIRATORY FAILURE WITH HYPOXIA (H): ICD-10-CM

## 2020-02-26 LAB
C DIFF TOX B STL QL: NEGATIVE
SPECIMEN SOURCE: NORMAL

## 2020-02-26 PROCEDURE — 99309 SBSQ NF CARE MODERATE MDM 30: CPT | Performed by: NURSE PRACTITIONER

## 2020-02-26 RX ORDER — ONDANSETRON 4 MG/1
4 TABLET, ORALLY DISINTEGRATING ORAL 3 TIMES DAILY PRN
COMMUNITY
Start: 2020-02-25 | End: 2020-03-09

## 2020-02-26 RX ORDER — ACETAMINOPHEN 325 MG/1
650 TABLET ORAL EVERY 4 HOURS PRN
COMMUNITY
Start: 2020-02-25

## 2020-02-26 NOTE — PROGRESS NOTES
[place] [facility]    [place] Medical Record Number:  [id number]  Place of Service where encounter took place:  [facility] ([facility]) [[id number]]  Chief Complaint   Patient presents with     RECHECK       HPI:    Ron Gilmore  is a 79 year old (1940), who is being seen today for an episodic care visit.  HPI information obtained from: facility chart records, facility staff, patient report and [facility] chart review.     Patient admitted to [facility] 1/14-1/23 due to LUE weakness, garbled speech. Was diagnosed with CVA, started on plavix x 3 weeks w/ASA x 3 weeks; followed by ASA 325mg every day. Implantable loop recorder placed on 1/20. Hx prior CVA 2006 with minimal residual deficits including LLE weakness and dysphagia. PTA nonambulatory, uses power wheelchair secondary to back surgery and knee problems.   Also noted to have LLL pneumonia, completed course of Augmentin.   Patient then transferred to [facility] on 1/23.       Today's concern is:    During exam, patient seen resting in bed. Reports feeling fatigued today. States a few days ago had two episodes of emesis, since then has not reported additional recurrences of emesis. Denies diarrhea, abdominal pain, fevers, nausea. Admits to good appetite. Denies coughing, continues to require continuous O2 (1-2L). Continues to have left-sided hemiparesis, reports no changes since last week. Has been participating in therapy. Denies chest pain, SOB, headache, syncope.  following for discharge planning. States goal is to discharge home w/spouse.         Past Medical and Surgical History reviewed in Epic today.    MEDICATIONS:    Current Outpatient Medications   Medication Sig Dispense Refill     acetaminophen (TYLENOL) 325 MG tablet Take 650 mg by mouth every 4 hours as needed for mild pain as needed for pain/fever Max dose 3000mg/24hr       albuterol (2.5 MG/3ML) 0.083% neb solution Take 1 vial (2.5 mg) by nebulization every 2 hours as  needed for shortness of breath / dyspnea or other (dyspnea) 360 mL 0     ALLOPURINOL PO Take 300 mg by mouth daily       aspirin (ASA) 325 MG EC tablet Take 1 tablet (325 mg) by mouth daily       atorvastatin (LIPITOR) 10 MG tablet Take 1 tablet (10 mg) by mouth every evening       benzocaine-menthol (CHLORASEPTIC) 6-10 MG lozenge Place 1 lozenge inside cheek every hour as needed for sore throat (dry/sore throat without fever)       Emollient (AMLACTIN ULTRA EX) Externally apply topically daily APPLY TO FEET        ipratropium - albuterol 0.5 mg/2.5 mg/3 mL 0.5-2.5 (3) MG/3ML IN neb solution Take 1 vial by nebulization 3 times daily And q4hrs prn       meclizine (ANTIVERT) 25 MG tablet Take 25 mg by mouth every 6 hours as needed for dizziness       metoprolol tartrate (LOPRESSOR) 25 MG tablet Take 1 tablet (25 mg) by mouth 2 times daily       ondansetron (ZOFRAN-ODT) 4 MG ODT tab Take 4 mg by mouth 3 times daily as needed for nausea       PARoxetine HCl (PAXIL PO) Take 30 mg by mouth daily        tamsulosin (FLOMAX) 0.4 MG capsule Take 0.8 mg by mouth daily             REVIEW OF SYSTEMS:  4 point ROS including Respiratory, CV, GI and , other than that noted in the HPI,  is negative      Objective:  /78   Pulse 98   Temp 98.8  F (37.1  C)   Resp 18   SpO2 94%   Exam:GENERAL APPEARANCE:  Alert, in no distress, oriented, cooperative  RESP:  respiratory effort and palpation of chest normal, lungs clear to auscultation , no respiratory distress, diminished breath sounds at bases  CV:  Palpation and auscultation of heart done , regular rate and rhythm, no murmur, rub, or gallop, no edema, +2 pedal pulses  ABDOMEN:  normal bowel sounds, soft, nontender,  no guarding or rebound  : Cardona intact w/adequate output, moderate amount of sediment.   M/S:   Gait and station abnormal, primarily wheelchair bound. Digits and nails normal  SKIN:  Inspection of skin and subcutaneous tissue baseline, Palpation of skin and  subcutaneous tissue baseline  NEURO:   Cranial nerves 2-12 are normal tested and grossly at patient's baseline, Examination of sensation by touch normal  PSYCH:  oriented X 3, memory impaired , affect and mood normal      Labs:   Labs done in SNF are in [facility]. Please refer to them using EPIC/Care Everywhere., Recent labs in EPIC reviewed by me today.  and Most Recent 3 CBC's:  Recent Labs   Lab Test 01/27/20  0602 01/22/20  0838 01/21/20  0751   WBC 9.3 5.8 11.6*   HGB 13.1* 12.4* 13.6   MCV 94 94 93    157 176     Most Recent 3 BMP's:  Recent Labs   Lab Test 02/12/20  0536 02/06/20  0530 01/27/20  0602    138 136   POTASSIUM 3.9 4.4 3.9   CHLORIDE 101 105 97   CO2 30 31 35*   BUN 17 23 12   CR 0.69 0.69 0.78   ANIONGAP 4 2* 4   RAJ 8.8 8.5 8.8   * 93 86         ASSESSMENT/PLAN:    (A08.11) Gastroenteritis due to norovirus  Comment: Known exposure to norovirus on the TCU. Emesis on 2/24 and episodes of loose stools on 2/25.   Plan:   - Check CBC & BMP 2/27  - Obtain stool sample for norovirus, C diff  - Continue precautions  - Monitor BM, intake, weights, vital signs     (I69.354) Hemiparesis affecting left side as late effect of cerebrovascular accident (CVA) (H)  (primary encounter diagnosis)  (I69.391) Dysphagia due to recent cerebrovascular accident (CVA)  Comment: Left sided hemiparesis and dysphagia r/t CVA. Implantable loop recorder placed on 1/20. Hx prior CVA 2006 with minimal residual deficits including LLE weakness and dysphagia. Diet advanced to regular w/nectar thick liquids following video swallow study on 2/14.  Plan:   - Continue ASA 325mg every day; continue lipitor, metoprolol  - ST following, recently increased diet to regular w/nectar thick liquids  - Patient to follow-up with Neurologist as directed  - Patient [facility] with  [facility]  - Patient verbalizes understanding and agrees w/treatment plan      (N40.1,  R39.14) Benign prostatic hyperplasia with  incomplete bladder emptying  Comment: Chronic BPH w/hx recurrent UTI. Required chirinos during recent hospital stay, failed voiding trial at TCU. UC 2/6+ 10,000 to 50,000 colonies/mL Enterococcus faecalis and >100,000 colonies/mL Proteus mirabilis; completed course of Ceftin on 2/14. Requires chirinos due to recurrent urinary retention.   Plan:   - RN to replace stat lock and tubing due to concern for tubing blockage and increased sediment   - Continue flomax  - Patient to follow-up with Urologist as directed  - Patient verbalizes understanding and agrees w/treatment plan      (J44.9) Chronic obstructive pulmonary disease, unspecified COPD type (H)  (J96.01) Acute respiratory failure with hypoxia (H)  (G47.33) BRAD (obstructive sleep apnea)  Comment: Acute respiratory failure r/t recent hx pneumonia and chronic COPD. Chronic BRAD. PTA not on supplemental O2, since hospitalization has required 1-2L O2.   Plan:   - Add scheduled duoneb TID, continue duoneb prn use  - Discontinue albuterol nebulizer   - Continue to wean O2 as tolerated, keep O2 > 90%. Patient will likely require home O2, discussed w/RN to complete O2 testing.   - Continue CPAP at night    (R53.81) Physical deconditioning  Comment: Ongoing physical deconditioning r/t left-sided hemiparesis associated with CVA. Nonambulatory. Requires slide board transfers, bed mobility, dressing, dependent w/toileting. SLUMS 24/30.   Plan:   - Encourage active participation in therapy session to increase strength and promote independence in activities and ADLs.   - SW following for discharge planning. Patient states goal is to discharge home w/spouse.              Electronically signed by:  ELIZABETH Newsome CNP

## 2020-02-27 ENCOUNTER — TELEPHONE (OUTPATIENT)
Dept: GERIATRICS | Facility: CLINIC | Age: 80
End: 2020-02-27

## 2020-02-27 ENCOUNTER — HOSPITAL LABORATORY (OUTPATIENT)
Dept: OTHER | Facility: CLINIC | Age: 80
End: 2020-02-27

## 2020-02-27 LAB
ANION GAP SERPL CALCULATED.3IONS-SCNC: 2 MMOL/L (ref 3–14)
BUN SERPL-MCNC: 35 MG/DL (ref 7–30)
CALCIUM SERPL-MCNC: 8.6 MG/DL (ref 8.5–10.1)
CHLORIDE SERPL-SCNC: 99 MMOL/L (ref 94–109)
CO2 SERPL-SCNC: 34 MMOL/L (ref 20–32)
CREAT SERPL-MCNC: 0.86 MG/DL (ref 0.66–1.25)
ENTERIC PATHOGEN COMMENT: ABNORMAL
ERYTHROCYTE [DISTWIDTH] IN BLOOD BY AUTOMATED COUNT: 15.9 % (ref 10–15)
GFR SERPL CREATININE-BSD FRML MDRD: 83 ML/MIN/{1.73_M2}
GLUCOSE SERPL-MCNC: 99 MG/DL (ref 70–99)
HCT VFR BLD AUTO: 46.7 % (ref 40–53)
HGB BLD-MCNC: 13.8 G/DL (ref 13.3–17.7)
MCH RBC QN AUTO: 28.2 PG (ref 26.5–33)
MCHC RBC AUTO-ENTMCNC: 29.6 G/DL (ref 31.5–36.5)
MCV RBC AUTO: 95 FL (ref 78–100)
NOROV GI+II ORF1-ORF2 JNC STL QL NAA+PR: ABNORMAL
PLATELET # BLD AUTO: 206 10E9/L (ref 150–450)
POTASSIUM SERPL-SCNC: 4.2 MMOL/L (ref 3.4–5.3)
RBC # BLD AUTO: 4.9 10E12/L (ref 4.4–5.9)
SODIUM SERPL-SCNC: 135 MMOL/L (ref 133–144)
WBC # BLD AUTO: 9.6 10E9/L (ref 4–11)

## 2020-03-02 ENCOUNTER — NURSING HOME VISIT (OUTPATIENT)
Dept: GERIATRICS | Facility: CLINIC | Age: 80
End: 2020-03-02
Payer: COMMERCIAL

## 2020-03-02 ENCOUNTER — TELEPHONE (OUTPATIENT)
Dept: GERIATRICS | Facility: CLINIC | Age: 80
End: 2020-03-02

## 2020-03-02 VITALS
TEMPERATURE: 99.6 F | RESPIRATION RATE: 22 BRPM | DIASTOLIC BLOOD PRESSURE: 82 MMHG | OXYGEN SATURATION: 95 % | BODY MASS INDEX: 35.62 KG/M2 | SYSTOLIC BLOOD PRESSURE: 149 MMHG | HEART RATE: 85 BPM | HEIGHT: 72 IN | WEIGHT: 263 LBS

## 2020-03-02 DIAGNOSIS — J96.01 ACUTE RESPIRATORY FAILURE WITH HYPOXIA (H): ICD-10-CM

## 2020-03-02 DIAGNOSIS — G47.33 OSA (OBSTRUCTIVE SLEEP APNEA): ICD-10-CM

## 2020-03-02 DIAGNOSIS — J44.9 CHRONIC OBSTRUCTIVE PULMONARY DISEASE, UNSPECIFIED COPD TYPE (H): Primary | ICD-10-CM

## 2020-03-02 DIAGNOSIS — A08.11 GASTROENTERITIS DUE TO NOROVIRUS: ICD-10-CM

## 2020-03-02 PROCEDURE — 99309 SBSQ NF CARE MODERATE MDM 30: CPT | Performed by: NURSE PRACTITIONER

## 2020-03-02 ASSESSMENT — MIFFLIN-ST. JEOR: SCORE: 1955.96

## 2020-03-02 NOTE — PROGRESS NOTES
[place] [facility]    [place] Medical Record Number:  [id number]  Place of Service where encounter took place:  [facility] ([facility] [[id number]]  Chief Complaint   Patient presents with     Nursing Home Acute       HPI:    Ron Gilmore  is a 79 year old (1940), who is being seen today for an episodic care visit.  HPI information obtained from: facility chart records, facility staff, patient report and [facility] chart review.     Patient admitted to [facility] 1/14-1/23 due to LUE weakness, garbled speech. Was diagnosed with CVA, started on plavix x 3 weeks w/ASA x 3 weeks; followed by ASA 325mg every day. Implantable loop recorder placed on 1/20. Hx prior CVA 2006 with minimal residual deficits including LLE weakness and dysphagia. PTA nonambulatory, uses power wheelchair secondary to back surgery and knee problems.   Also noted to have LLL pneumonia, completed course of Augmentin.   Patient then transferred to [facility] on 1/23.        Today's concern is:    During exam, patient seen resting in bed. Reports ongoing fatigue associated with recent norovirus. Patient developed GI sx (emesis, diarrhea) last week with known exposure on TCU. Today, patient denies having additional episodes of emesis. States diarrhea stopped and starting to have more formed stools. Does endorse new cough. Wears continuous O2 since admitted to TCU. Denies SOB. Continues to have left-sided hemiparesis. Also admits to ongoing dysphagia, currently on regular diet w/nectar thick liquids. Has been working with ST and dietary. Reports appetite improving since last week. Afebrile.         Past Medical and Surgical History reviewed in Epic today.    MEDICATIONS:    Current Outpatient Medications   Medication Sig Dispense Refill     acetaminophen (TYLENOL) 325 MG tablet Take 650 mg by mouth every 4 hours as needed for mild pain as needed for pain/fever Max dose 3000mg/24hr       ALLOPURINOL PO Take 300 mg by mouth  daily       aspirin (ASA) 325 MG EC tablet Take 1 tablet (325 mg) by mouth daily       atorvastatin (LIPITOR) 10 MG tablet Take 1 tablet (10 mg) by mouth every evening       benzocaine-menthol (CHLORASEPTIC) 6-10 MG lozenge Place 1 lozenge inside cheek every hour as needed for sore throat (dry/sore throat without fever)       Emollient (AMLACTIN ULTRA EX) Externally apply topically daily APPLY TO FEET        ipratropium - albuterol 0.5 mg/2.5 mg/3 mL 0.5-2.5 (3) MG/3ML IN neb solution Take 1 vial by nebulization 3 times daily And q4hrs prn       meclizine (ANTIVERT) 25 MG tablet Take 25 mg by mouth every 6 hours as needed for dizziness       metoprolol tartrate (LOPRESSOR) 25 MG tablet Take 1 tablet (25 mg) by mouth 2 times daily       ondansetron (ZOFRAN-ODT) 4 MG ODT tab Take 4 mg by mouth 3 times daily as needed for nausea       PARoxetine HCl (PAXIL PO) Take 30 mg by mouth daily        tamsulosin (FLOMAX) 0.4 MG capsule Take 0.8 mg by mouth daily             REVIEW OF SYSTEMS:  4 point ROS including Respiratory, CV, GI and , other than that noted in the HPI,  is negative      Objective:  BP (!) 149/82   Pulse 85   Temp 99.6  F (37.6  C)   Resp 22   Ht 1.829 m (6')   Wt 119.3 kg (263 lb)   SpO2 95%   BMI 35.67 kg/m    Exam:  GENERAL APPEARANCE:  Alert, in no distress, oriented, cooperative  RESP:  respiratory effort and palpation of chest normal, lungs clear to auscultation , no respiratory distress, diminished breath sounds at bases  CV:  Palpation and auscultation of heart done , regular rate and rhythm, no murmur, rub, or gallop, no edema, +2 pedal pulses  ABDOMEN:  normal bowel sounds, soft, nontender,  no guarding or rebound  : Cardona intact w/adequate output, moderate amount of sediment.   M/S:   Gait and station abnormal, primarily wheelchair bound. Digits and nails normal  SKIN:  Inspection of skin and subcutaneous tissue baseline, Palpation of skin and subcutaneous tissue  baseline  NEURO:   Cranial nerves 2-12 are normal tested and grossly at patient's baseline, Examination of sensation by touch normal  PSYCH:  oriented X 3, memory impaired , affect and mood normal      Labs:   Labs done in SNF are in [facility]. Please refer to them using EPIC/Care Everywhere., Recent labs in EPIC reviewed by me today.  and Most Recent 3 CBC's:  Recent Labs   Lab Test 03/03/20  0829 02/27/20  0631 01/27/20  0602   WBC 8.5 9.6 9.3   HGB 12.9* 13.8 13.1*   MCV 94 95 94    206 182     Most Recent 3 BMP's:  Recent Labs   Lab Test 03/03/20  0829 02/27/20  0631 02/12/20  0536    135 135   POTASSIUM 3.8 4.2 3.9   CHLORIDE 103 99 101   CO2 33* 34* 30   BUN 23 35* 17   CR 0.72 0.86 0.69   ANIONGAP 3 2* 4   RAJ 8.8 8.6 8.8   * 99 100*         ASSESSMENT/PLAN:    (J44.9) Chronic obstructive pulmonary disease, unspecified COPD type (H)  (J96.01) Acute respiratory failure with hypoxia (H)  (G47.33) BRAD (obstructive sleep apnea)  Comment: Acute respiratory failure r/t recent hx pneumonia and chronic COPD. Chronic BRAD. PTA not on supplemental O2, since hospitalization has required 1-2L O2. +cough.   Plan:   - CXR 3/2  - Check CBC & BMP 3/3  - Continue scheduled duoneb TID, and prn use  - Continue to wean O2 as tolerated, keep O2 > 90%. Patient will likely require home O2, discussed w/RN to complete O2 testing.   - Continue CPAP at night    (A08.11) Gastroenteritis due to norovirus  Comment: Acute norovirus likely r/t known exposure to norovirus on the TCU. Emesis on 2/24 and episodes of loose stools on 2/25. No reports of GI sx today. Afebrile.   Plan:   - Encourage fluids  - Continue precautions  - Monitor BM, intake, weights, vital signs              Electronically signed by:  Marina Salcido, ELIZABETH CNP

## 2020-03-03 ENCOUNTER — HOSPITAL LABORATORY (OUTPATIENT)
Dept: OTHER | Facility: CLINIC | Age: 80
End: 2020-03-03

## 2020-03-03 LAB
ANION GAP SERPL CALCULATED.3IONS-SCNC: 3 MMOL/L (ref 3–14)
BUN SERPL-MCNC: 23 MG/DL (ref 7–30)
CALCIUM SERPL-MCNC: 8.8 MG/DL (ref 8.5–10.1)
CHLORIDE SERPL-SCNC: 103 MMOL/L (ref 94–109)
CO2 SERPL-SCNC: 33 MMOL/L (ref 20–32)
CREAT SERPL-MCNC: 0.72 MG/DL (ref 0.66–1.25)
ERYTHROCYTE [DISTWIDTH] IN BLOOD BY AUTOMATED COUNT: 16 % (ref 10–15)
GFR SERPL CREATININE-BSD FRML MDRD: 90 ML/MIN/{1.73_M2}
GLUCOSE SERPL-MCNC: 103 MG/DL (ref 70–99)
HCT VFR BLD AUTO: 42.7 % (ref 40–53)
HGB BLD-MCNC: 12.9 G/DL (ref 13.3–17.7)
MCH RBC QN AUTO: 28.4 PG (ref 26.5–33)
MCHC RBC AUTO-ENTMCNC: 30.2 G/DL (ref 31.5–36.5)
MCV RBC AUTO: 94 FL (ref 78–100)
PLATELET # BLD AUTO: 178 10E9/L (ref 150–450)
POTASSIUM SERPL-SCNC: 3.8 MMOL/L (ref 3.4–5.3)
RBC # BLD AUTO: 4.55 10E12/L (ref 4.4–5.9)
SODIUM SERPL-SCNC: 139 MMOL/L (ref 133–144)
WBC # BLD AUTO: 8.5 10E9/L (ref 4–11)

## 2020-03-03 NOTE — TELEPHONE ENCOUNTER
"[facility] TELEPHONE ENCOUNTER  Chief Complaint   Patient presents with     X-ray Results   Ron Gilmore is a 79 year old  (1940),Nurse called today to report CXR results which show:    \"Impression: No vascular congestion, no consolidation, no pleural effusion, no acute findings. Consistent w/ bronchitis.\" VS are stable and labs are scheduled for tomorrow.      ASSESSMENT/PLAN  Acute Bronchitis. Most likely viral. We note his underlying COPD w/ nebs and cough lozenges already ordered. Will add Mucinex and have nursing update provider team when next on site.     Orders:  1. Mucinex 400mg PO Q4h PRN x 3 days. Dx: Cough.    Electronically signed by:  Dr. Maylin Vazquez, APRN CNP DNP    "

## 2020-03-06 ENCOUNTER — TELEPHONE (OUTPATIENT)
Dept: GERIATRICS | Facility: CLINIC | Age: 80
End: 2020-03-06

## 2020-03-06 ENCOUNTER — NURSING HOME VISIT (OUTPATIENT)
Dept: GERIATRICS | Facility: CLINIC | Age: 80
End: 2020-03-06
Payer: COMMERCIAL

## 2020-03-06 VITALS
BODY MASS INDEX: 35.08 KG/M2 | WEIGHT: 259 LBS | HEIGHT: 72 IN | SYSTOLIC BLOOD PRESSURE: 117 MMHG | RESPIRATION RATE: 18 BRPM | HEART RATE: 75 BPM | TEMPERATURE: 97.7 F | DIASTOLIC BLOOD PRESSURE: 75 MMHG | OXYGEN SATURATION: 93 %

## 2020-03-06 DIAGNOSIS — Z97.8 COMPLAINT ABOUT URINARY CATHETER: ICD-10-CM

## 2020-03-06 DIAGNOSIS — R39.14 BENIGN PROSTATIC HYPERPLASIA WITH INCOMPLETE BLADDER EMPTYING: Primary | ICD-10-CM

## 2020-03-06 DIAGNOSIS — N40.1 BENIGN PROSTATIC HYPERPLASIA WITH INCOMPLETE BLADDER EMPTYING: Primary | ICD-10-CM

## 2020-03-06 DIAGNOSIS — R39.9 COMPLAINT ABOUT URINARY CATHETER: ICD-10-CM

## 2020-03-06 PROCEDURE — 99309 SBSQ NF CARE MODERATE MDM 30: CPT | Performed by: NURSE PRACTITIONER

## 2020-03-06 ASSESSMENT — MIFFLIN-ST. JEOR: SCORE: 1937.82

## 2020-03-06 NOTE — PROGRESS NOTES
[facility] [facility]    [place] Medical Record Number:  [id number]  Place of Service where encounter took place:  [facility] ([facility] [[id number]]  Chief Complaint   Patient presents with     RECHECK       HPI:    Ron Gilmore  is a 79 year old (1940), who is being seen today for an episodic care visit.  HPI information obtained from: facility chart records, facility staff, patient report and [facility] chart review.     Patient admitted to [facility] 1/14-1/23 due to LUE weakness, garbled speech. Was diagnosed with CVA, started on plavix x 3 weeks w/ASA x 3 weeks; followed by ASA 325mg every day. Implantable loop recorder placed on 1/20. Hx prior CVA 2006 with minimal residual deficits including LLE weakness and dysphagia. PTA nonambulatory, uses power wheelchair secondary to back surgery and knee problems.   Also noted to have LLL pneumonia, completed course of Augmentin.   Patient then transferred to [facility] on 1/23.        Today's concern is:    During exam, patient seen resting in bed. Patient c/o urine has been bypassing catheter, patient does not know when catheter was last changed but thinks it was a few weeks ago. Denies suprapubic pain, hematuria. Afebrile. Patient reports all recommended follow-up appointments were cancelled by wife due to spouse unable to be at appointments.         Past Medical and Surgical History reviewed in Epic today.    MEDICATIONS:    Current Outpatient Medications   Medication Sig Dispense Refill     acetaminophen (TYLENOL) 325 MG tablet Take 650 mg by mouth every 4 hours as needed for mild pain as needed for pain/fever Max dose 3000mg/24hr       ALLOPURINOL PO Take 300 mg by mouth daily       aspirin (ASA) 325 MG EC tablet Take 1 tablet (325 mg) by mouth daily       atorvastatin (LIPITOR) 10 MG tablet Take 1 tablet (10 mg) by mouth every evening       benzocaine-menthol (CHLORASEPTIC) 6-10 MG lozenge Place 1 lozenge inside cheek every hour as  needed for sore throat (dry/sore throat without fever)       Emollient (AMLACTIN ULTRA EX) Externally apply topically daily APPLY TO FEET        ipratropium - albuterol 0.5 mg/2.5 mg/3 mL 0.5-2.5 (3) MG/3ML IN neb solution Take 1 vial by nebulization 3 times daily And q4hrs prn       meclizine (ANTIVERT) 25 MG tablet Take 25 mg by mouth every 6 hours as needed for dizziness       metoprolol tartrate (LOPRESSOR) 25 MG tablet Take 1 tablet (25 mg) by mouth 2 times daily       ondansetron (ZOFRAN-ODT) 4 MG ODT tab Take 4 mg by mouth 3 times daily as needed for nausea       PARoxetine HCl (PAXIL PO) Take 30 mg by mouth daily        tamsulosin (FLOMAX) 0.4 MG capsule Take 0.8 mg by mouth daily             REVIEW OF SYSTEMS:  4 point ROS including Respiratory, CV, GI and , other than that noted in the HPI,  is negative      Objective:  /75   Pulse 75   Temp 97.7  F (36.5  C)   Resp 18   Ht 1.829 m (6')   Wt 117.5 kg (259 lb)   SpO2 93%   BMI 35.13 kg/m    Exam:  GENERAL APPEARANCE:  Alert, in no distress, oriented, cooperative  RESP:  respiratory effort and palpation of chest normal, lungs clear to auscultation , no respiratory distress, diminished breath sounds at bases  CV:  Palpation and auscultation of heart done , regular rate and rhythm, no murmur, rub, or gallop, no edema, +2 pedal pulses  ABDOMEN:  normal bowel sounds, soft, nontender,  no guarding or rebound  : Cardona intact w/adequate output, moderate amount of sediment.   M/S:   Gait and station abnormal, primarily wheelchair bound. Digits and nails normal  SKIN:  Inspection of skin and subcutaneous tissue baseline, Palpation of skin and subcutaneous tissue baseline  NEURO:   Cranial nerves 2-12 are normal tested and grossly at patient's baseline, Examination of sensation by touch normal  PSYCH:  oriented X 3, memory impaired , affect and mood normal    Labs:   Labs done in SNF are in [place] EPIC. Please refer to them using [employer]/Care  Everywhere., Recent labs in EPIC reviewed by me today.  and   Most Recent 3 CBC's:  Recent Labs   Lab Test 03/03/20  0829 02/27/20  0631 01/27/20  0602   WBC 8.5 9.6 9.3   HGB 12.9* 13.8 13.1*   MCV 94 95 94    206 182     Most Recent 3 BMP's:  Recent Labs   Lab Test 03/03/20  0829 02/27/20  0631 02/12/20  0536    135 135   POTASSIUM 3.8 4.2 3.9   CHLORIDE 103 99 101   CO2 33* 34* 30   BUN 23 35* 17   CR 0.72 0.86 0.69   ANIONGAP 3 2* 4   RAJ 8.8 8.6 8.8   * 99 100*         ASSESSMENT/PLAN:    (N40.1,  R39.14) Benign prostatic hyperplasia with incomplete bladder emptying  (primary encounter diagnosis)  (R39.9,  Z97.8) Complaint about urinary catheter  Comment: Chronic BPH, requires chirinos catheter due to urinary retention. Acute complaint regarding bypassing urine.   Plan:   - Change catheter today d/t concern for bypassing urine, then change [id number]  - Continue flomax  - Patient to follow-up with Urologist as directed    - Per [facility], patient's spouse cancelled follow-up appointments.   Needs follow-up with Cardiologist, Neurologist, and Urologist          Electronically signed by:  ELIZABETH Newsome CNP

## 2020-03-07 ENCOUNTER — HOSPITAL LABORATORY (OUTPATIENT)
Dept: OTHER | Facility: CLINIC | Age: 80
End: 2020-03-07

## 2020-03-07 LAB
ALBUMIN UR-MCNC: >600 MG/DL
APPEARANCE UR: ABNORMAL
BACTERIA #/AREA URNS HPF: ABNORMAL /HPF
BASOPHILS # BLD AUTO: 0 10E9/L (ref 0–0.2)
BASOPHILS NFR BLD AUTO: 0 %
BILIRUB UR QL STRIP: NEGATIVE
COLOR UR AUTO: YELLOW
DIFFERENTIAL METHOD BLD: ABNORMAL
EOSINOPHIL # BLD AUTO: 0.3 10E9/L (ref 0–0.7)
EOSINOPHIL NFR BLD AUTO: 5 %
ERYTHROCYTE [DISTWIDTH] IN BLOOD BY AUTOMATED COUNT: 16.1 % (ref 10–15)
GLUCOSE UR STRIP-MCNC: NEGATIVE MG/DL
HCT VFR BLD AUTO: 42.1 % (ref 40–53)
HGB BLD-MCNC: 12.6 G/DL (ref 13.3–17.7)
HGB UR QL STRIP: ABNORMAL
IMM GRANULOCYTES # BLD: 0 10E9/L (ref 0–0.4)
IMM GRANULOCYTES NFR BLD: 0.3 %
KETONES UR STRIP-MCNC: NEGATIVE MG/DL
LEUKOCYTE ESTERASE UR QL STRIP: ABNORMAL
LYMPHOCYTES # BLD AUTO: 0.7 10E9/L (ref 0.8–5.3)
LYMPHOCYTES NFR BLD AUTO: 10 %
MCH RBC QN AUTO: 28.4 PG (ref 26.5–33)
MCHC RBC AUTO-ENTMCNC: 29.9 G/DL (ref 31.5–36.5)
MCV RBC AUTO: 95 FL (ref 78–100)
MONOCYTES # BLD AUTO: 1 10E9/L (ref 0–1.3)
MONOCYTES NFR BLD AUTO: 15.8 %
MUCOUS THREADS #/AREA URNS LPF: PRESENT /LPF
NEUTROPHILS # BLD AUTO: 4.5 10E9/L (ref 1.6–8.3)
NEUTROPHILS NFR BLD AUTO: 68.9 %
NITRATE UR QL: POSITIVE
NRBC # BLD AUTO: 0 10*3/UL
NRBC BLD AUTO-RTO: 0 /100
PH UR STRIP: 8 PH (ref 5–7)
PLATELET # BLD AUTO: 180 10E9/L (ref 150–450)
RBC # BLD AUTO: 4.44 10E12/L (ref 4.4–5.9)
RBC #/AREA URNS AUTO: 19 /HPF (ref 0–2)
SOURCE: ABNORMAL
SP GR UR STRIP: 1.03 (ref 1–1.03)
TRI-PHOS CRY #/AREA URNS HPF: ABNORMAL /HPF
UROBILINOGEN UR STRIP-MCNC: NORMAL MG/DL (ref 0–2)
WBC # BLD AUTO: 6.6 10E9/L (ref 4–11)
WBC #/AREA URNS AUTO: 99 /HPF (ref 0–5)

## 2020-03-07 NOTE — TELEPHONE ENCOUNTER
Resident continues to run a fever despite Tylenol given.  Slightly over 100 degrees  Staff thought he had a nasal swab done already for influenza    No ABX right now.  CXR on the 2nd showed Bronchitis.  UA/UC was done a month ago.    Orders:  CBC with diff tomorrow  UA/UC for fever    Electronically signed by Deb Barroso RN, CNP

## 2020-03-09 ENCOUNTER — NURSING HOME VISIT (OUTPATIENT)
Dept: GERIATRICS | Facility: CLINIC | Age: 80
End: 2020-03-09
Payer: COMMERCIAL

## 2020-03-09 VITALS
DIASTOLIC BLOOD PRESSURE: 66 MMHG | TEMPERATURE: 98.9 F | HEART RATE: 77 BPM | RESPIRATION RATE: 20 BRPM | WEIGHT: 260 LBS | HEIGHT: 72 IN | OXYGEN SATURATION: 90 % | SYSTOLIC BLOOD PRESSURE: 107 MMHG | BODY MASS INDEX: 35.21 KG/M2

## 2020-03-09 DIAGNOSIS — R39.14 BENIGN PROSTATIC HYPERPLASIA WITH INCOMPLETE BLADDER EMPTYING: ICD-10-CM

## 2020-03-09 DIAGNOSIS — B96.4 URINARY TRACT INFECTION DUE TO PROTEUS: Primary | ICD-10-CM

## 2020-03-09 DIAGNOSIS — N39.0 URINARY TRACT INFECTION ASSOCIATED WITH INDWELLING URETHRAL CATHETER, SUBSEQUENT ENCOUNTER: ICD-10-CM

## 2020-03-09 DIAGNOSIS — T83.511D URINARY TRACT INFECTION ASSOCIATED WITH INDWELLING URETHRAL CATHETER, SUBSEQUENT ENCOUNTER: ICD-10-CM

## 2020-03-09 DIAGNOSIS — N39.0 URINARY TRACT INFECTION DUE TO PROTEUS: Primary | ICD-10-CM

## 2020-03-09 DIAGNOSIS — N40.1 BENIGN PROSTATIC HYPERPLASIA WITH INCOMPLETE BLADDER EMPTYING: ICD-10-CM

## 2020-03-09 LAB
BACTERIA SPEC CULT: ABNORMAL
SPECIMEN SOURCE: ABNORMAL

## 2020-03-09 PROCEDURE — 99309 SBSQ NF CARE MODERATE MDM 30: CPT | Performed by: NURSE PRACTITIONER

## 2020-03-09 RX ORDER — CEFPODOXIME PROXETIL 100 MG/1
200 TABLET, FILM COATED ORAL 2 TIMES DAILY
COMMUNITY
Start: 2020-03-09 | End: 2020-03-23

## 2020-03-09 RX ORDER — CEFUROXIME AXETIL 250 MG/1
250 TABLET ORAL 2 TIMES DAILY
COMMUNITY
Start: 2020-03-07 | End: 2020-03-09 | Stop reason: ALTCHOICE

## 2020-03-09 RX ORDER — NYSTATIN 100000 [USP'U]/G
POWDER TOPICAL 2 TIMES DAILY
COMMUNITY
Start: 2020-03-07 | End: 2020-03-20

## 2020-03-09 ASSESSMENT — MIFFLIN-ST. JEOR: SCORE: 1942.35

## 2020-03-09 NOTE — PROGRESS NOTES
[facility]    [place] Medical Record Number:  [id number]  Place of Service where encounter took place:  [facility]) [[id number]]  Chief Complaint   Patient presents with     Nursing Home Acute       HPI:    Ron Gilmore  is a 79 year old (1940), who is being seen today for an episodic care visit.  HPI information obtained from: facility chart records, facility staff, patient report and [facility] chart review.     Patient admitted to [facility] 1/14-1/23 due to LUE weakness, garbled speech. Was diagnosed with CVA, started on plavix x 3 weeks w/ASA x 3 weeks; followed by ASA 325mg every day. Implantable loop recorder placed on 1/20. Hx prior CVA 2006 with minimal residual deficits including LLE weakness and dysphagia. PTA nonambulatory, uses power wheelchair secondary to back surgery and knee problems.   Also noted to have LLL pneumonia, completed course of Augmentin.   Patient then transferred to [facility] on 1/23.       Today's concern is:    Per RN, UA/UC was collected over the weekend due to fevers and increased sediment in catheter. States was started on ceftin and UC results are back to review.    During exam, patient seen resting in bed. Does endorse ongoing suprapubic discomfort that started this weekend. States feels increased fatigue. Has been participating in therapy, progress has been variable. Per therapy staff, requires slide board transfers with moderate assist x 2 some day, and other days uses aleks lift.         Past Medical and Surgical History reviewed in Epic today.    MEDICATIONS:    Current Outpatient Medications   Medication Sig Dispense Refill     acetaminophen (TYLENOL) 325 MG tablet Take 650 mg by mouth every 4 hours as needed for mild pain as needed for pain/fever Max dose 3000mg/24hr       ALLOPURINOL PO Take 300 mg by mouth daily       aspirin (ASA) 325 MG EC tablet Take 1 tablet (325 mg) by mouth daily       atorvastatin (LIPITOR) 10 MG  tablet Take 1 tablet (10 mg) by mouth every evening       benzocaine-menthol (CHLORASEPTIC) 6-10 MG lozenge Place 1 lozenge inside cheek every hour as needed for sore throat (dry/sore throat without fever)       cefpodoxime (VANTIN) 100 MG tablet Take 200 mg by mouth 2 times daily       Emollient (AMLACTIN ULTRA EX) Externally apply topically daily APPLY TO FEET        ipratropium - albuterol 0.5 mg/2.5 mg/3 mL 0.5-2.5 (3) MG/3ML IN neb solution Take 1 vial by nebulization 3 times daily And q4hrs prn       meclizine (ANTIVERT) 25 MG tablet Take 25 mg by mouth every 6 hours as needed for dizziness       metoprolol tartrate (LOPRESSOR) 25 MG tablet Take 1 tablet (25 mg) by mouth 2 times daily       nystatin (MYCOSTATIN) 913035 UNIT/GM external powder Apply topically 2 times daily Apply to scrotum       PARoxetine HCl (PAXIL PO) Take 30 mg by mouth daily        tamsulosin (FLOMAX) 0.4 MG capsule Take 0.8 mg by mouth daily             REVIEW OF SYSTEMS:  4 point ROS including Respiratory, CV, GI and , other than that noted in the HPI,  is negative      Objective:  /66   Pulse 77   Temp 98.9  F (37.2  C)   Resp 20   Ht 1.829 m (6')   Wt 117.9 kg (260 lb)   SpO2 90%   BMI 35.26 kg/m    Exam:  GENERAL APPEARANCE:  Alert, in no distress, oriented, cooperative  RESP:  respiratory effort and palpation of chest normal, lungs clear to auscultation , no respiratory distress, diminished breath sounds at bases  CV:  Palpation and auscultation of heart done , regular rate and rhythm, no murmur, rub, or gallop, no edema, +2 pedal pulses  ABDOMEN:  normal bowel sounds, soft, nontender,  no guarding or rebound  : Cardona intact w/adequate output.  M/S:   Gait and station abnormal, primarily wheelchair bound. Digits and nails normal  SKIN:  Inspection of skin and subcutaneous tissue baseline, Palpation of skin and subcutaneous tissue baseline  NEURO:   Cranial nerves 2-12 are normal tested and grossly at patient's  baseline, Examination of sensation by touch normal  PSYCH:  oriented X 3, memory impaired , affect and mood normal      Labs:   Labs done in SNF are in [place] EPIC. Please refer to them using EPIC/Care Everywhere., Recent labs in EPIC reviewed by me today.  and   Most Recent 3 CBC's:  Recent Labs   Lab Test 03/07/20  0900 03/03/20  0829 02/27/20  0631   WBC 6.6 8.5 9.6   HGB 12.6* 12.9* 13.8   MCV 95 94 95    178 206     Most Recent 3 BMP's:  Recent Labs   Lab Test 03/03/20  0829 02/27/20  0631 02/12/20  0536    135 135   POTASSIUM 3.8 4.2 3.9   CHLORIDE 103 99 101   CO2 33* 34* 30   BUN 23 35* 17   CR 0.72 0.86 0.69   ANIONGAP 3 2* 4   RAJ 8.8 8.6 8.8   * 99 100*           ASSESSMENT/PLAN:    (N39.0,  B96.4) Urinary tract infection due to Proteus  (primary encounter diagnosis)  (T83.511D,  N39.0) Urinary tract infection associated with indwelling urethral catheter, subsequent encounter  Comment: UC+ > 100K colonies of Proteus mirabilis, sensitive to ceftin. Per uptodate, ceftin to be used for uncomplicated UTI. Since patient has complex medical hx and male w/catheter, will treat for complicated UTI  Plan:   - Discontinue ceftin  - Add cefpodoxime 200mg BID x 14 days    (N40.1,  R39.14) Benign prostatic hyperplasia with incomplete bladder emptying  (primary encounter diagnosis)  Comment: Chronic BPH, requires chirinos catheter due to urinary retention.  Plan:   - Change catheter [id number]  - Monitor urine output  - Continue flomax  - Patient to follow-up with Urologist as directed          Electronically signed by:  ELIZABETH Newsome CNP

## 2020-03-10 ENCOUNTER — NURSING HOME VISIT (OUTPATIENT)
Dept: GERIATRICS | Facility: CLINIC | Age: 80
End: 2020-03-10
Payer: COMMERCIAL

## 2020-03-10 VITALS
SYSTOLIC BLOOD PRESSURE: 125 MMHG | OXYGEN SATURATION: 90 % | BODY MASS INDEX: 35.27 KG/M2 | TEMPERATURE: 97.7 F | HEART RATE: 83 BPM | RESPIRATION RATE: 16 BRPM | HEIGHT: 72 IN | WEIGHT: 260.4 LBS | DIASTOLIC BLOOD PRESSURE: 81 MMHG

## 2020-03-10 DIAGNOSIS — J96.11 CHRONIC RESPIRATORY FAILURE WITH HYPOXIA (H): ICD-10-CM

## 2020-03-10 DIAGNOSIS — J44.9 CHRONIC OBSTRUCTIVE PULMONARY DISEASE, UNSPECIFIED COPD TYPE (H): Primary | ICD-10-CM

## 2020-03-10 DIAGNOSIS — G47.33 OSA (OBSTRUCTIVE SLEEP APNEA): ICD-10-CM

## 2020-03-10 DIAGNOSIS — I69.354 HEMIPARESIS AFFECTING LEFT SIDE AS LATE EFFECT OF CEREBROVASCULAR ACCIDENT (CVA) (H): ICD-10-CM

## 2020-03-10 PROCEDURE — 99309 SBSQ NF CARE MODERATE MDM 30: CPT | Performed by: NURSE PRACTITIONER

## 2020-03-10 ASSESSMENT — MIFFLIN-ST. JEOR: SCORE: 1944.17

## 2020-03-10 NOTE — PROGRESS NOTES
Face to Face and Medical Necessity Statement for DME Provider visit    Demographic Information on Ron Gilmore:  Gender: male  : 1940  [address]   [place] [id number] [phone] (home) none (work)    Medical Record: [id number]  Social Security Number: [id number]  Primary Care Provider: Augustin Thomas  Insurance: Payor: [facility] / Plan: [facility] MEDICARE [employer] / Product Type: HMO /     HPI:   Ron Gilmore is a 79 year old  (1940), who is being seen today for a face to face provider visit at [facility]; medical necessity statement for DME included. This patient requires the following:    DME Ordered and Medical Necessity Statement:     Oxygen: 1-3 L/min via nasal cannual continuous 24hrs per day;   Clinical Documentation: (Obtain documented RA sat with in 2 days of discharge in acute setting or within 30 days of provider visit):  Method 1: Room air at rest: Qualifing sat level is < 88% or below on room air at rest on March 10, 2020. O2 sats 84% on 1L, O2 sats increased to 95% on 2L on 3/10/20.      Terrie lift for transfers due to left sided hemiparesis associated with hx CVA        Pt needing above DME with expected length of need of 99 years due to medical necessity associated with following diagnosis:     Chronic obstructive pulmonary disease, unspecified COPD type (H)  Chronic respiratory failure with hypoxia (H)  BRAD (obstructive sleep apnea)  Hemiparesis affecting left side as late effect of cerebrovascular accident (CVA) (H)      PMH   has a past medical history of BPH (benign prostatic hyperplasia), Cataract, Cholelithiasis, COPD (chronic obstructive pulmonary disease) (H), Depression, Diabetes mellitus, Dyshidrotic foot dermatitis, Edema, Gout, Hyperlipidemia, Hypertension, Kidney stones, Lumbago, Lumbar disc displacement without myelopathy, Muscle weakness, Neuropathy, diabetic (H), Obesity, Spinal stenosis, Stroke (H), Unsteady gait, Urinary  retention with incomplete bladder emptying, and UTI (urinary tract infection). He also has no past medical history of Asymptomatic human immunodeficiency virus (HIV) infection status (H), Blood in semen, Cerebral palsy (H), Complication of anesthesia, Congenital renal agenesis and dysgenesis, Difficult intubation, Epididymitis, bilateral, Epididymitis, left, Epididymitis, right, Goiter, Hernia, abdominal, History of spinal cord injury, History of thrombophlebitis, Horseshoe kidney, Hydrocephalus (H), Malignant hyperthermia, Mumps, Orchitis, epididymitis, and epididymo-orchitis, with abscess, Palpitations, Parkinsons disease (H), Penile discharge, PONV (postoperative nausea and vomiting), Prostate infection, Spider veins, Spina bifida (H), Spinal headache, STD (sexually transmitted disease), Swelling of testicle, Tethered cord (H), or Tuberculosis.      ROS:4 point ROS including Respiratory, CV, GI and , other than that noted in the HPI,  is negative      EXAM  Vitals: /81   Pulse 83   Temp 97.7  F (36.5  C)   Resp 16   Ht 1.829 m (6')   Wt 118.1 kg (260 lb 6.4 oz)   SpO2 90%   BMI 35.32 kg/m  ;BMI= Body mass index is 35.32 kg/m .  GENERAL APPEARANCE:  Alert, in no distress, oriented, cooperative  RESP:  respiratory effort and palpation of chest normal, lungs clear to auscultation , no respiratory distress, diminished breath sounds at bases  CV:  Palpation and auscultation of heart done , regular rate and rhythm, no murmur, rub, or gallop, no edema, +2 pedal pulses  ABDOMEN:  normal bowel sounds, soft, nontender,  no guarding or rebound  : Cardona intact w/adequate output, moderate amount of sediment.   M/S:   Gait and station abnormal, primarily wheelchair bound. Digits and nails normal  SKIN:  Inspection of skin and subcutaneous tissue baseline, Palpation of skin and subcutaneous tissue baseline  NEURO:   Cranial nerves 2-12 are normal tested and grossly at patient's baseline, Examination of  sensation by touch normal  PSYCH:  oriented X 3, memory impaired , affect and mood normal        ASSESSMENT/PLAN:  1. Chronic obstructive pulmonary disease, unspecified COPD type (H)    2. Chronic respiratory failure with hypoxia (H)    3. BRAD (obstructive sleep apnea)    4. Hemiparesis affecting left side as late effect of cerebrovascular accident (CVA) (H)          Orders:  1. Facility staff/TC to contact DME company to get their order form for provider to fill out      ELECTRONICALLY SIGNED BY TEE CERTIFIED PROVIDER:  ELIZABETH Newsome CNP   NPI: [id number]  [facility]  [address] [place] [address], [address]  [place] [address]

## 2020-03-10 NOTE — PROGRESS NOTES
[facility]    [place] Medical Record Number:  [id number]  Place of Service where encounter took place:  [facility] [[id number]]  Chief Complaint   Patient presents with     Nursing Home Acute       HPI:    Ron Gilmore  is a 79 year old (1940), who is being seen today for an episodic care visit.  HPI information obtained from: facility chart records, facility staff, patient report and [facility] chart review.     Patient admitted to [facility] 1/14-1/23 due to LUE weakness, garbled speech. Was diagnosed with CVA, started on plavix x 3 weeks w/ASA x 3 weeks; followed by ASA 325mg every day. Implantable loop recorder placed on 1/20. Hx prior CVA 2006 with minimal residual deficits including LLE weakness and dysphagia. PTA nonambulatory, uses power wheelchair secondary to back surgery and knee problems.   Also noted to have LLL pneumonia, completed course of Augmentin.     Patient then transferred to [facility] on 1/23.       Today's concern is:    During exam, patient seen resting in bed. Reports feeling better since being treated for UTI. Patient reports uncertainty if needing O2 at home. Discussed with patient has been using O2 continuously since at TCU, every attempt RN staff have made to wean off O2 has failed, O2 sats drop < 88%. Does endorse SOB w/increased activity that resolves w/rest. Does report having a nebulizer machine at home. States cough has improved, reports mainly dry cough. Does endorse ongoing hemiparesis r/t CVA, has been using slide board for transfers. States he doesn't think a aleks lift could fit in his home. SW following for discharge planning.             Past Medical and Surgical History reviewed in Epic today.    MEDICATIONS:    Current Outpatient Medications   Medication Sig Dispense Refill     acetaminophen (TYLENOL) 325 MG tablet Take 650 mg by mouth every 4 hours as needed for mild pain as needed for pain/fever Max dose 3000mg/24hr        ALLOPURINOL PO Take 300 mg by mouth daily       aspirin (ASA) 325 MG EC tablet Take 1 tablet (325 mg) by mouth daily       atorvastatin (LIPITOR) 10 MG tablet Take 1 tablet (10 mg) by mouth every evening       cefpodoxime (VANTIN) 100 MG tablet Take 200 mg by mouth 2 times daily       Emollient (AMLACTIN ULTRA EX) Externally apply topically daily APPLY TO FEET        ipratropium - albuterol 0.5 mg/2.5 mg/3 mL 0.5-2.5 (3) MG/3ML IN neb solution Take 1 vial by nebulization 3 times daily And q4hrs prn       metoprolol tartrate (LOPRESSOR) 25 MG tablet Take 1 tablet (25 mg) by mouth 2 times daily       nystatin (MYCOSTATIN) 097015 UNIT/GM external powder Apply topically 2 times daily Apply to scrotum       PARoxetine HCl (PAXIL PO) Take 30 mg by mouth daily        tamsulosin (FLOMAX) 0.4 MG capsule Take 0.8 mg by mouth daily             REVIEW OF SYSTEMS:  4 point ROS including Respiratory, CV, GI and , other than that noted in the HPI,  is negative      Objective:  /81   Pulse 83   Temp 97.7  F (36.5  C)   Resp 16   Ht 1.829 m (6')   Wt 118.1 kg (260 lb 6.4 oz)   SpO2 90%   BMI 35.32 kg/m    Exam:  GENERAL APPEARANCE:  Alert, in no distress, oriented, cooperative  RESP:  respiratory effort and palpation of chest normal, lungs clear to auscultation , no respiratory distress, diminished breath sounds at bases  CV:  Palpation and auscultation of heart done , regular rate and rhythm, no murmur, rub, or gallop, no edema, +2 pedal pulses  ABDOMEN:  normal bowel sounds, soft, nontender,  no guarding or rebound  : Cardona intact w/adequate output, moderate amount of sediment.   M/S: Left sided hemiparesis. Gait and station abnormal, primarily wheelchair bound. Digits and nails normal  SKIN:  Inspection of skin and subcutaneous tissue baseline, Palpation of skin and subcutaneous tissue baseline  NEURO:   Cranial nerves 2-12 are normal tested and grossly at patient's baseline, Examination of sensation by touch  normal  PSYCH:  oriented X 3, memory impaired , affect and mood normal      Labs:   Recent labs in [facility] reviewed by me today.       ASSESSMENT/PLAN:    (J44.9) Chronic obstructive pulmonary disease, unspecified COPD type (H)  (primary encounter diagnosis)  (J96.11) Chronic respiratory failure with hypoxia (H)  (G47.33) BRAD (obstructive sleep apnea)  Comment: Chronic COPD and chronic respiratory failure with hypoxia, requires continuous O2. Chronic BRAD.   Plan:   - Home O2 ordered  - Continue scheduled duonebs and prn use  - Continue CPAP at night  - Monitor O2 sats and respiratory status    (I69.354) Hemiparesis affecting left side as late effect of cerebrovascular accident (CVA) (H)  Comment: Chronic left-sided hemiparesis related to CVA. Primarily wheelchair bound  Plan:   - Requires assistance with ADLs, transfers; RN to provide family w/education needed to care for patient at home   - SW following for discharge planning.           Electronically signed by:  ELIZABETH Newsome CNP

## 2020-03-11 ENCOUNTER — HOSPITAL LABORATORY (OUTPATIENT)
Dept: OTHER | Facility: CLINIC | Age: 80
End: 2020-03-11

## 2020-03-11 LAB
ANION GAP SERPL CALCULATED.3IONS-SCNC: 1 MMOL/L (ref 3–14)
BUN SERPL-MCNC: 24 MG/DL (ref 7–30)
CALCIUM SERPL-MCNC: 8.7 MG/DL (ref 8.5–10.1)
CHLORIDE SERPL-SCNC: 105 MMOL/L (ref 94–109)
CO2 SERPL-SCNC: 33 MMOL/L (ref 20–32)
CREAT SERPL-MCNC: 0.6 MG/DL (ref 0.66–1.25)
GFR SERPL CREATININE-BSD FRML MDRD: >90 ML/MIN/{1.73_M2}
GLUCOSE SERPL-MCNC: 102 MG/DL (ref 70–99)
POTASSIUM SERPL-SCNC: 4.4 MMOL/L (ref 3.4–5.3)
SODIUM SERPL-SCNC: 139 MMOL/L (ref 133–144)

## 2020-03-12 ENCOUNTER — DISCHARGE SUMMARY NURSING HOME (OUTPATIENT)
Dept: GERIATRICS | Facility: CLINIC | Age: 80
End: 2020-03-12
Payer: COMMERCIAL

## 2020-03-12 VITALS
RESPIRATION RATE: 18 BRPM | BODY MASS INDEX: 35.72 KG/M2 | HEIGHT: 72 IN | TEMPERATURE: 97.7 F | OXYGEN SATURATION: 94 % | DIASTOLIC BLOOD PRESSURE: 82 MMHG | SYSTOLIC BLOOD PRESSURE: 147 MMHG | WEIGHT: 263.7 LBS | HEART RATE: 72 BPM

## 2020-03-12 DIAGNOSIS — N39.0 URINARY TRACT INFECTION DUE TO PROTEUS: ICD-10-CM

## 2020-03-12 DIAGNOSIS — B96.4 URINARY TRACT INFECTION DUE TO PROTEUS: ICD-10-CM

## 2020-03-12 DIAGNOSIS — I69.391 DYSPHAGIA DUE TO RECENT CEREBROVASCULAR ACCIDENT (CVA): ICD-10-CM

## 2020-03-12 DIAGNOSIS — J96.11 CHRONIC RESPIRATORY FAILURE WITH HYPOXIA (H): ICD-10-CM

## 2020-03-12 DIAGNOSIS — N39.0 URINARY TRACT INFECTION ASSOCIATED WITH INDWELLING URETHRAL CATHETER, SUBSEQUENT ENCOUNTER: ICD-10-CM

## 2020-03-12 DIAGNOSIS — J44.9 CHRONIC OBSTRUCTIVE PULMONARY DISEASE, UNSPECIFIED COPD TYPE (H): Primary | ICD-10-CM

## 2020-03-12 DIAGNOSIS — R53.81 PHYSICAL DECONDITIONING: ICD-10-CM

## 2020-03-12 DIAGNOSIS — I69.354 HEMIPARESIS AFFECTING LEFT SIDE AS LATE EFFECT OF CEREBROVASCULAR ACCIDENT (CVA) (H): ICD-10-CM

## 2020-03-12 DIAGNOSIS — N40.1 BENIGN PROSTATIC HYPERPLASIA WITH INCOMPLETE BLADDER EMPTYING: ICD-10-CM

## 2020-03-12 DIAGNOSIS — R39.14 BENIGN PROSTATIC HYPERPLASIA WITH INCOMPLETE BLADDER EMPTYING: ICD-10-CM

## 2020-03-12 DIAGNOSIS — T83.511D URINARY TRACT INFECTION ASSOCIATED WITH INDWELLING URETHRAL CATHETER, SUBSEQUENT ENCOUNTER: ICD-10-CM

## 2020-03-12 DIAGNOSIS — G47.33 OSA (OBSTRUCTIVE SLEEP APNEA): ICD-10-CM

## 2020-03-12 PROCEDURE — 99316 NF DSCHRG MGMT 30 MIN+: CPT | Performed by: NURSE PRACTITIONER

## 2020-03-12 ASSESSMENT — MIFFLIN-ST. JEOR: SCORE: 1959.14

## 2020-03-12 NOTE — LETTER
3/12/2020        RE: Ron Gilmore  [address]  [place] [address]        [facility] DISCHARGE SUMMARY    PATIENT'S NAME: Ron Gilmore  YOB: 1940  MEDICAL RECORD NUMBER:  [id number]  Place of Service where encounter took place:  [facility] [[id number]]    PRIMARY CARE PROVIDER AND CLINIC RESPONSIBLE AFTER TRANSFER:   Augustin Thomas MD, [address] / [place] [address]    Non-[facility] Provider     Transferring providers: ELIZABETH Newsome CNP; Nic Garland MD  Recent Hospitalization/ED:  [facility] stay 1/14/20 to 1/23/20.  Date of SNF Admission: January / 23 / 2020  Date of SNF (anticipated) Discharge: March / 14 / 2020  Discharged to: previous independent home  Cognitive Scores: SLUMS: 9/30  Physical Function: Primarily wheelchair bound. Requires slide board for transfers.  DME: Wheelchair, Hospital Bed, Home Nebulizer, Home Oxygen and DME F2F done for home O2    CODE STATUS/ADVANCE DIRECTIVES DISCUSSION:  Full Code   ALLERGIES: Patient has no known allergies.      DISCHARGE DIAGNOSIS/NURSING FACILITY COURSE:     Patient admitted to [facility] 1/14-1/23 due to LUE weakness, garbled speech. Was diagnosed with CVA, started on plavix x 3 weeks w/ASA x 3 weeks; followed by ASA 325mg every day. Implantable loop recorder placed on 1/20. Hx prior CVA 2006 with minimal residual deficits including LLE weakness and dysphagia. PTA nonambulatory, uses power wheelchair secondary to back surgery and knee problems.   Also noted to have LLL pneumonia, completed course of Augmentin.      Patient then transferred to [facility] on 1/23.        Chronic obstructive pulmonary disease, unspecified COPD type (H)  Chronic respiratory failure with hypoxia (H)  BRAD (obstructive sleep apnea)  Chronic COPD and chronic respiratory failure with hypoxia, requires continuous O2. Chronic BRAD, wears CPAP at night. Home O2  orders placed. Patient reports having nebulizer at home.     Hemiparesis affecting left side as late effect of cerebrovascular accident (CVA) (H)  Dysphagia due to recent cerebrovascular accident (CVA)  Chronic left-sided hemiparesis related to CVA. Implantable loop recorder placed on 1/20. Hx prior CVA 2006 with minimal residual deficits including LLE weakness and dysphagia. Primarily wheelchair bound. Also with chronic dysphagia associated with recent CVA. Currently on regular diet with nectar thick liquids. Currently taking ASA.   - Patient to follow-up with Neurologist as directed  - Patient to follow-up with Cardiologist as directed     UTI due to Proteus and indwelling catheter  Benign prostatic hyperplasia with incomplete bladder emptying  UC+ > 100K colonies of Proteus mirabilis. Course of Cefpodoxime to be completed on 3/23. Has chronic chirinos due to urinary retention. Currently taking flomax due to BPH. Required chirinos during hospital stay and failed voiding trial at TCU.   - Patient to follow-up with Urologist as directed    Physical deconditioning  PTA lives w/spouse. Primarily wheelchair bound due to left sided hemiparesis. SW following for discharge planning. Patient discharging home w/spouse and family support, as well as [facility] home care services, including home PT, OT, RN, HHA, SW. Per patient's family, able to provide 24hr supervision and care as needed. Family completed training w/RN staff prior to discharge.              Past Medical History:  has a past medical history of BPH (benign prostatic hyperplasia), Cataract, Cholelithiasis, COPD (chronic obstructive pulmonary disease) (H), Depression, Diabetes mellitus, Dyshidrotic foot dermatitis, Edema, Gout, Hyperlipidemia, Hypertension, Kidney stones, Lumbago, Lumbar disc displacement without myelopathy, Muscle weakness, Neuropathy, diabetic (H), Obesity, Spinal stenosis, Stroke (H), Unsteady gait, Urinary retention with incomplete bladder emptying, and  UTI (urinary tract infection). He also has no past medical history of Asymptomatic human immunodeficiency virus (HIV) infection status (H), Blood in semen, Cerebral palsy (H), Complication of anesthesia, Congenital renal agenesis and dysgenesis, Difficult intubation, Epididymitis, bilateral, Epididymitis, left, Epididymitis, right, Goiter, Hernia, abdominal, History of spinal cord injury, History of thrombophlebitis, Horseshoe kidney, Hydrocephalus (H), Malignant hyperthermia, Mumps, Orchitis, epididymitis, and epididymo-orchitis, with abscess, Palpitations, Parkinsons disease (H), Penile discharge, PONV (postoperative nausea and vomiting), Prostate infection, Spider veins, Spina bifida (H), Spinal headache, STD (sexually transmitted disease), Swelling of testicle, Tethered cord (H), or Tuberculosis.    Discharge Medications:    Current Outpatient Medications   Medication Sig Dispense Refill     acetaminophen (TYLENOL) 325 MG tablet Take 650 mg by mouth every 4 hours as needed for mild pain as needed for pain/fever Max dose 3000mg/24hr       ALLOPURINOL PO Take 300 mg by mouth daily       aspirin (ASA) 325 MG EC tablet Take 1 tablet (325 mg) by mouth daily       atorvastatin (LIPITOR) 10 MG tablet Take 1 tablet (10 mg) by mouth every evening       cefpodoxime (VANTIN) 100 MG tablet Take 200 mg by mouth 2 times daily       Emollient (AMLACTIN ULTRA EX) Externally apply topically daily APPLY TO FEET        ipratropium - albuterol 0.5 mg/2.5 mg/3 mL 0.5-2.5 (3) MG/3ML IN neb solution Take 1 vial by nebulization 3 times daily And q4hrs prn       metoprolol tartrate (LOPRESSOR) 25 MG tablet Take 1 tablet (25 mg) by mouth 2 times daily       nystatin (MYCOSTATIN) 974032 UNIT/GM external powder Apply topically 2 times daily Apply to scrotum       PARoxetine HCl (PAXIL PO) Take 30 mg by mouth daily        tamsulosin (FLOMAX) 0.4 MG capsule Take 0.8 mg by mouth daily           Medication Changes/Rationale:   - Course of  Cefpodoxime to be completed on 3/23 dx UTI  - Added scheduled duonebs TID and prn use    Controlled medications sent with patient:   not applicable/none         ROS:   10 point ROS of systems including Constitutional, Eyes, Respiratory, Cardiovascular, Gastroenterology, Genitourinary, Integumentary, Musculoskeletal, Psychiatric were all negative except for pertinent positives noted in my HPI.      Physical Exam:   Vitals: BP (!) 147/82   Pulse 72   Temp 97.7  F (36.5  C)   Resp 18   Ht 1.829 m (6')   Wt 119.6 kg (263 lb 11.2 oz)   SpO2 94%   BMI 35.76 kg/m    BMI= Body mass index is 35.76 kg/m .  GENERAL APPEARANCE:  Alert, in no distress, appears healthy, oriented, cooperative  ENT:  Mouth and posterior oropharynx normal, moist mucous membranes, normal hearing acuity  EYES:  EOM, conjunctivae, lids, pupils and irises normal, PERRL  NECK:  No adenopathy,masses or thyromegaly   RESP:  respiratory effort and palpation of chest normal, lungs clear to auscultation , no respiratory distress, diminished breath sounds at bases  CV:  Palpation and auscultation of heart done , regular rate and rhythm, no murmur, rub, or gallop, no edema, +2 pedal pulses  ABDOMEN:  normal bowel sounds, soft, nontender,  no guarding or rebound  : Cardona intact w/adequate output  M/S:   Gait and station abnormal, primarily wheelchair bound. Digits and nails normal  SKIN:  Inspection of skin and subcutaneous tissue baseline, Palpation of skin and subcutaneous tissue baseline  NEURO:   Cranial nerves 2-12 are normal tested and grossly at patient's baseline, Examination of sensation by touch normal  PSYCH:  oriented X 3, memory impaired , affect and mood normal    Wt Readings from Last 4 Encounters:   03/12/20 119.6 kg (263 lb 11.2 oz)   03/10/20 118.1 kg (260 lb 6.4 oz)   03/09/20 117.9 kg (260 lb)   03/06/20 117.5 kg (259 lb)         SNF labs: Labs done in SNF are in [facility]. Please refer to them using [employer]/Care Everywhere., Recent  labs in EPIC reviewed by me today.  and Most Recent 3 CBC's:  Recent Labs   Lab Test 03/07/20  0900 03/03/20  0829 02/27/20  0631   WBC 6.6 8.5 9.6   HGB 12.6* 12.9* 13.8   MCV 95 94 95    178 206     Most Recent 3 BMP's:  Recent Labs   Lab Test 03/11/20  0738 03/03/20  0829 02/27/20  0631    139 135   POTASSIUM 4.4 3.8 4.2   CHLORIDE 105 103 99   CO2 33* 33* 34*   BUN 24 23 35*   CR 0.60* 0.72 0.86   ANIONGAP 1* 3 2*   RAJ 8.7 8.8 8.6   * 103* 99           DISCHARGE PLAN:    Follow up labs: No labs orders/due    MTM referral needed and placed by this provider: No    Current [facility] scheduled appointments:  1. Patient to follow-up with PCP within 7 days of discharge from TCU.  2. Patient to follow-up with Urologist dx urinary retention  3. Patient to follow-up with Neurologist as directed dx recent CVA w/hemiparesis      Discharge Services: Home Care:  Occupational Therapy, Physical Therapy, Registered Nurse, Home Health Aide,  and From:  [facility]      Discharge Instructions Verbalized to Patient at Discharge:     Continue to follow your diet:  Regular with nectar thick liquids    Notify PCP if you have a fever greater than 100.5 degrees.     Oxygen at 1-3 liters/minute via nasal cannula.     24-hour supervision is recommended for safety.               TOTAL DISCHARGE TIME:   Greater than 30 minutes    Electronically signed by:  ELIZABETH Newsome CNP                         Sincerely,        ELIZABETH Newsome CNP

## 2020-03-12 NOTE — PROGRESS NOTES
[facility] DISCHARGE SUMMARY    PATIENT'S NAME: Ron Gilmore  YOB: 1940  MEDICAL RECORD NUMBER:  [id number]  Place of Service where encounter took place:  [facility] [[id number]]    PRIMARY CARE PROVIDER AND CLINIC RESPONSIBLE AFTER TRANSFER:   Augustin Thomas MD, [address] / [place] [address]    Non-G Provider     Transferring providers: ELIZABETH Newsome CNP; Nic Garland MD  Recent Hospitalization/ED:  [facility] Hospital stay 20 to 20.  Date of SNF Admission:   Date of SNF (anticipated) Discharge:   Discharged to: previous independent home  Cognitive Scores: {fgscog1:037770}  Physical Function: {fgsphysicalfunction:428813}  DME: {fgsdmedc:865197}    CODE STATUS/ADVANCE DIRECTIVES DISCUSSION:  Full Code   ALLERGIES: Patient has no known allergies.      DISCHARGE DIAGNOSIS/NURSING FACILITY COURSE:     {fgsdia}            Past Medical History:  has a past medical history of BPH (benign prostatic hyperplasia), Cataract, Cholelithiasis, COPD (chronic obstructive pulmonary disease) (H), Depression, Diabetes mellitus, Dyshidrotic foot dermatitis, Edema, Gout, Hyperlipidemia, Hypertension, Kidney stones, Lumbago, Lumbar disc displacement without myelopathy, Muscle weakness, Neuropathy, diabetic (H), Obesity, Spinal stenosis, Stroke (H), Unsteady gait, Urinary retention with incomplete bladder emptying, and UTI (urinary tract infection). He also has no past medical history of Asymptomatic human immunodeficiency virus (HIV) infection status (H), Blood in semen, Cerebral palsy (H), Complication of anesthesia, Congenital renal agenesis and dysgenesis, Difficult intubation, Epididymitis, bilateral, Epididymitis, left, Epididymitis, right, Goiter, Hernia, abdominal, History of spinal cord injury, History of thrombophlebitis, Horseshoe kidney, Hydrocephalus (H),  Malignant hyperthermia, Mumps, Orchitis, epididymitis, and epididymo-orchitis, with abscess, Palpitations, Parkinsons disease (H), Penile discharge, PONV (postoperative nausea and vomiting), Prostate infection, Spider veins, Spina bifida (H), Spinal headache, STD (sexually transmitted disease), Swelling of testicle, Tethered cord (H), or Tuberculosis.    Discharge Medications:    Current Outpatient Medications   Medication Sig Dispense Refill     acetaminophen (TYLENOL) 325 MG tablet Take 650 mg by mouth every 4 hours as needed for mild pain as needed for pain/fever Max dose 3000mg/24hr       ALLOPURINOL PO Take 300 mg by mouth daily       aspirin (ASA) 325 MG EC tablet Take 1 tablet (325 mg) by mouth daily       atorvastatin (LIPITOR) 10 MG tablet Take 1 tablet (10 mg) by mouth every evening       cefpodoxime (VANTIN) 100 MG tablet Take 200 mg by mouth 2 times daily       Emollient (AMLACTIN ULTRA EX) Externally apply topically daily APPLY TO FEET        ipratropium - albuterol 0.5 mg/2.5 mg/3 mL 0.5-2.5 (3) MG/3ML IN neb solution Take 1 vial by nebulization 3 times daily And q4hrs prn       metoprolol tartrate (LOPRESSOR) 25 MG tablet Take 1 tablet (25 mg) by mouth 2 times daily       nystatin (MYCOSTATIN) 201143 UNIT/GM external powder Apply topically 2 times daily Apply to scrotum       PARoxetine HCl (PAXIL PO) Take 30 mg by mouth daily        tamsulosin (FLOMAX) 0.4 MG capsule Take 0.8 mg by mouth daily           Medication Changes/Rationale:   - Course of Cefpodoxime to be completed on 3/23 dx UTI  - Added scheduled duonebs TID and prn use    Controlled medications sent with patient:   not applicable/none         ROS:   10 point ROS of systems including Constitutional, Eyes, Respiratory, Cardiovascular, Gastroenterology, Genitourinary, Integumentary, Musculoskeletal, Psychiatric were all negative except for pertinent positives noted in my HPI.      Physical Exam:   Vitals: BP (!) 147/82   Pulse 72   Temp  97.7  F (36.5  C)   Resp 18   Ht 1.829 m (6')   Wt 119.6 kg (263 lb 11.2 oz)   SpO2 94%   BMI 35.76 kg/m    BMI= Body mass index is 35.76 kg/m .  {NURSING HOME PHYSICAL EXAM:558875}     Wt Readings from Last 4 Encounters:   03/12/20 119.6 kg (263 lb 11.2 oz)   03/10/20 118.1 kg (260 lb 6.4 oz)   03/09/20 117.9 kg (260 lb)   03/06/20 117.5 kg (259 lb)         SNF labs: Labs done in SNF are in [facility]. Please refer to them using [employer]/Care Everywhere., Recent labs in EPIC reviewed by me today.  and Most Recent 3 CBC's:  Recent Labs   Lab Test 03/07/20  0900 03/03/20  0829 02/27/20  0631   WBC 6.6 8.5 9.6   HGB 12.6* 12.9* 13.8   MCV 95 94 95    178 206     Most Recent 3 BMP's:  Recent Labs   Lab Test 03/11/20  0738 03/03/20  0829 02/27/20  0631    139 135   POTASSIUM 4.4 3.8 4.2   CHLORIDE 105 103 99   CO2 33* 33* 34*   BUN 24 23 35*   CR 0.60* 0.72 0.86   ANIONGAP 1* 3 2*   RAJ 8.7 8.8 8.6   * 103* 99           DISCHARGE PLAN:    Follow up labs: No labs orders/due    MTM referral needed and placed by this provider: No    Current [facility] scheduled appointments:  1. Patient to follow-up with PCP within 7 days of discharge from TCU.  2. Patient to follow-up with Urologist dx urinary retention  3. Patient to follow-up with Neurologist as directed dx recent CVA w/hemiparesis      Discharge Services: Home Care:  Occupational Therapy, Physical Therapy, Registered Nurse, Home Health Aide,  and From:  [facility]      Discharge Instructions Verbalized to Patient at Discharge:     Continue to follow your diet:  Regular with nectar thick liquids    Notify PCP if you have a fever greater than 100.5 degrees.     Oxygen at 1-3 liters/minute via nasal cannula.     24-hour supervision is recommended for safety.               TOTAL DISCHARGE TIME:   Greater than 30 minutes    Electronically signed by:  ELIZABETH Newsome CNP

## 2020-03-13 NOTE — PATIENT INSTRUCTIONS
[facility] Discharge Orders    Name: Ron Gilmore  : 1940  Planned Discharge Date: 3/14/20  Discharged to: previous independent home with family to provide 24hr support at home      MEDICAL FOLLOW UP  1. Patient to follow-up with PCP within 7 days of discharge from TCU.  2. Patient to follow-up with Urologist dx urinary retention  3. Patient to follow-up with Neurologist as directed dx recent CVA w/hemiparesis      FUTURE LABS: No labs orders/due      ORDER CHANGES:  - Course of Cefpodoxime to be completed on 3/23 dx UTI  - Added scheduled duonebs TID and prn use    DISCHARGE MEDICATIONS:  The patient s pharmacy is authorized to dispense a 30-day supply of medications. Refill requests should be directed to the primary provider, Augustin Thomas.   No narcotics are prescribed at time of discharge.     Current Outpatient Medications   Medication Sig Dispense Refill     acetaminophen (TYLENOL) 325 MG tablet Take 650 mg by mouth every 4 hours as needed for mild pain as needed for pain/fever Max dose 3000mg/24hr       ALLOPURINOL PO Take 300 mg by mouth daily       aspirin (ASA) 325 MG EC tablet Take 1 tablet (325 mg) by mouth daily       atorvastatin (LIPITOR) 10 MG tablet Take 1 tablet (10 mg) by mouth every evening       cefpodoxime (VANTIN) 100 MG tablet Take 200 mg by mouth 2 times daily       Emollient (AMLACTIN ULTRA EX) Externally apply topically daily APPLY TO FEET        ipratropium - albuterol 0.5 mg/2.5 mg/3 mL 0.5-2.5 (3) MG/3ML IN neb solution Take 1 vial by nebulization 3 times daily And q4hrs prn       metoprolol tartrate (LOPRESSOR) 25 MG tablet Take 1 tablet (25 mg) by mouth 2 times daily       nystatin (MYCOSTATIN) 403347 UNIT/GM external powder Apply topically 2 times daily Apply to scrotum       PARoxetine HCl (PAXIL PO) Take 30 mg by mouth daily        tamsulosin (FLOMAX) 0.4 MG capsule Take 0.8 mg by mouth daily         SERVICES:   Home Care:  Occupational Therapy,  Physical Therapy, Registered Nurse, Home Health Aide,  and From:  [facility]    ADDITIONAL INSTRUCTIONS:    Continue to follow your diet:  Regular with nectar thick liquids    Notify PCP if you have a fever greater than 100.5 degrees.     Oxygen at 1-3 liters/minute via nasal cannula.     24-hour supervision is recommended for safety.         ELIZABETH Newsome CNP  This document was electronically signed on March 13, 2020

## 2020-03-14 ENCOUNTER — ANCILLARY PROCEDURE (OUTPATIENT)
Dept: CARDIOLOGY | Facility: CLINIC | Age: 80
End: 2020-03-14
Attending: INTERNAL MEDICINE
Payer: COMMERCIAL

## 2020-03-14 DIAGNOSIS — R55 SYNCOPE: ICD-10-CM

## 2020-03-14 PROCEDURE — G2066 INTER DEVC REMOTE 30D: HCPCS | Performed by: INTERNAL MEDICINE

## 2020-03-14 PROCEDURE — 93297 REM INTERROG DEV EVAL ICPMS: CPT | Performed by: INTERNAL MEDICINE

## 2020-03-16 DIAGNOSIS — R55 SYNCOPE: Primary | ICD-10-CM

## 2020-03-16 NOTE — PROGRESS NOTES
[facility] DISCHARGE SUMMARY    PATIENT'S NAME: Ron Gilmore  YOB: 1940  MEDICAL RECORD NUMBER:  [id number]  Place of Service where encounter took place:  [facility] [[id number]]    PRIMARY CARE PROVIDER AND CLINIC RESPONSIBLE AFTER TRANSFER:   Augustin Thomas MD, [address] / [place] [address]    Non-[facility] Provider     Transferring providers: ELIZABETH Newsome CNP; Nic Garland MD  Recent Hospitalization/ED:  [facility] stay 1/14/20 to 1/23/20.  Date of SNF Admission: January / 23 / 2020  Date of SNF (anticipated) Discharge: March / 14 / 2020  Discharged to: previous independent home  Cognitive Scores: SLUMS: 9/30  Physical Function: Primarily wheelchair bound. Requires slide board for transfers.  DME: Wheelchair, Hospital Bed, Home Nebulizer, Home Oxygen and DME F2F done for home O2    CODE STATUS/ADVANCE DIRECTIVES DISCUSSION:  Full Code   ALLERGIES: Patient has no known allergies.      DISCHARGE DIAGNOSIS/NURSING FACILITY COURSE:     Patient admitted to [facility] 1/14-1/23 due to LUE weakness, garbled speech. Was diagnosed with CVA, started on plavix x 3 weeks w/ASA x 3 weeks; followed by ASA 325mg every day. Implantable loop recorder placed on 1/20. Hx prior CVA 2006 with minimal residual deficits including LLE weakness and dysphagia. PTA nonambulatory, uses power wheelchair secondary to back surgery and knee problems.   Also noted to have LLL pneumonia, completed course of Augmentin.      Patient then transferred to [facility] on 1/23.        Chronic obstructive pulmonary disease, unspecified COPD type (H)  Chronic respiratory failure with hypoxia (H)  BRAD (obstructive sleep apnea)  Chronic COPD and chronic respiratory failure with hypoxia, requires continuous O2. Chronic BRAD, wears CPAP at night. Home O2 orders placed. Patient reports having nebulizer at home.     Hemiparesis affecting left side as late  effect of cerebrovascular accident (CVA) (H)  Dysphagia due to recent cerebrovascular accident (CVA)  Chronic left-sided hemiparesis related to CVA. Implantable loop recorder placed on 1/20. Hx prior CVA 2006 with minimal residual deficits including LLE weakness and dysphagia. Primarily wheelchair bound. Also with chronic dysphagia associated with recent CVA. Currently on regular diet with nectar thick liquids. Currently taking ASA.   - Patient to follow-up with Neurologist as directed  - Patient to follow-up with Cardiologist as directed     UTI due to Proteus and indwelling catheter  Benign prostatic hyperplasia with incomplete bladder emptying  UC+ > 100K colonies of Proteus mirabilis. Course of Cefpodoxime to be completed on 3/23. Has chronic chirinos due to urinary retention. Currently taking flomax due to BPH. Required chirinos during hospital stay and failed voiding trial at TCU.   - Patient to follow-up with Urologist as directed    Physical deconditioning  PTA lives w/spouse. Primarily wheelchair bound due to left sided hemiparesis. SW following for discharge planning. Patient discharging home w/spouse and family support, as well as  home care services, including home PT, OT, RN, HHA, SW. Per patient's family, able to provide 24hr supervision and care as needed. Family completed training w/RN staff prior to discharge.              Past Medical History:  has a past medical history of BPH (benign prostatic hyperplasia), Cataract, Cholelithiasis, COPD (chronic obstructive pulmonary disease) (H), Depression, Diabetes mellitus, Dyshidrotic foot dermatitis, Edema, Gout, Hyperlipidemia, Hypertension, Kidney stones, Lumbago, Lumbar disc displacement without myelopathy, Muscle weakness, Neuropathy, diabetic (H), Obesity, Spinal stenosis, Stroke (H), Unsteady gait, Urinary retention with incomplete bladder emptying, and UTI (urinary tract infection). He also has no past medical history of Asymptomatic human  immunodeficiency virus (HIV) infection status (H), Blood in semen, Cerebral palsy (H), Complication of anesthesia, Congenital renal agenesis and dysgenesis, Difficult intubation, Epididymitis, bilateral, Epididymitis, left, Epididymitis, right, Goiter, Hernia, abdominal, History of spinal cord injury, History of thrombophlebitis, Horseshoe kidney, Hydrocephalus (H), Malignant hyperthermia, Mumps, Orchitis, epididymitis, and epididymo-orchitis, with abscess, Palpitations, Parkinsons disease (H), Penile discharge, PONV (postoperative nausea and vomiting), Prostate infection, Spider veins, Spina bifida (H), Spinal headache, STD (sexually transmitted disease), Swelling of testicle, Tethered cord (H), or Tuberculosis.    Discharge Medications:    Current Outpatient Medications   Medication Sig Dispense Refill     acetaminophen (TYLENOL) 325 MG tablet Take 650 mg by mouth every 4 hours as needed for mild pain as needed for pain/fever Max dose 3000mg/24hr       ALLOPURINOL PO Take 300 mg by mouth daily       aspirin (ASA) 325 MG EC tablet Take 1 tablet (325 mg) by mouth daily       atorvastatin (LIPITOR) 10 MG tablet Take 1 tablet (10 mg) by mouth every evening       cefpodoxime (VANTIN) 100 MG tablet Take 200 mg by mouth 2 times daily       Emollient (AMLACTIN ULTRA EX) Externally apply topically daily APPLY TO FEET        ipratropium - albuterol 0.5 mg/2.5 mg/3 mL 0.5-2.5 (3) MG/3ML IN neb solution Take 1 vial by nebulization 3 times daily And q4hrs prn       metoprolol tartrate (LOPRESSOR) 25 MG tablet Take 1 tablet (25 mg) by mouth 2 times daily       nystatin (MYCOSTATIN) 857396 UNIT/GM external powder Apply topically 2 times daily Apply to scrotum       PARoxetine HCl (PAXIL PO) Take 30 mg by mouth daily        tamsulosin (FLOMAX) 0.4 MG capsule Take 0.8 mg by mouth daily           Medication Changes/Rationale:   - Course of Cefpodoxime to be completed on 3/23 dx UTI  - Added scheduled duonebs TID and prn  use    Controlled medications sent with patient:   not applicable/none         ROS:   10 point ROS of systems including Constitutional, Eyes, Respiratory, Cardiovascular, Gastroenterology, Genitourinary, Integumentary, Musculoskeletal, Psychiatric were all negative except for pertinent positives noted in my HPI.      Physical Exam:   Vitals: BP (!) 147/82   Pulse 72   Temp 97.7  F (36.5  C)   Resp 18   Ht 1.829 m (6')   Wt 119.6 kg (263 lb 11.2 oz)   SpO2 94%   BMI 35.76 kg/m    BMI= Body mass index is 35.76 kg/m .  GENERAL APPEARANCE:  Alert, in no distress, appears healthy, oriented, cooperative  ENT:  Mouth and posterior oropharynx normal, moist mucous membranes, normal hearing acuity  EYES:  EOM, conjunctivae, lids, pupils and irises normal, PERRL  NECK:  No adenopathy,masses or thyromegaly   RESP:  respiratory effort and palpation of chest normal, lungs clear to auscultation , no respiratory distress, diminished breath sounds at bases  CV:  Palpation and auscultation of heart done , regular rate and rhythm, no murmur, rub, or gallop, no edema, +2 pedal pulses  ABDOMEN:  normal bowel sounds, soft, nontender,  no guarding or rebound  : Cardona intact w/adequate output  M/S:   Gait and station abnormal, primarily wheelchair bound. Digits and nails normal  SKIN:  Inspection of skin and subcutaneous tissue baseline, Palpation of skin and subcutaneous tissue baseline  NEURO:   Cranial nerves 2-12 are normal tested and grossly at patient's baseline, Examination of sensation by touch normal  PSYCH:  oriented X 3, memory impaired , affect and mood normal    Wt Readings from Last 4 Encounters:   03/12/20 119.6 kg (263 lb 11.2 oz)   03/10/20 118.1 kg (260 lb 6.4 oz)   03/09/20 117.9 kg (260 lb)   03/06/20 117.5 kg (259 lb)         SNF labs: Labs done in SNF are in [facility]. Please refer to them using [facility] Everywhere., Recent labs in EPIC reviewed by me today.  and Most Recent 3 CBC's:  Recent Labs   Lab  Test 03/07/20  0900 03/03/20  0829 02/27/20  0631   WBC 6.6 8.5 9.6   HGB 12.6* 12.9* 13.8   MCV 95 94 95    178 206     Most Recent 3 BMP's:  Recent Labs   Lab Test 03/11/20  0738 03/03/20  0829 02/27/20  0631    139 135   POTASSIUM 4.4 3.8 4.2   CHLORIDE 105 103 99   CO2 33* 33* 34*   BUN 24 23 35*   CR 0.60* 0.72 0.86   ANIONGAP 1* 3 2*   RAJ 8.7 8.8 8.6   * 103* 99           DISCHARGE PLAN:    Follow up labs: No labs orders/due    MTM referral needed and placed by this provider: No    Current [facility] scheduled appointments:  1. Patient to follow-up with PCP within 7 days of discharge from TCU.  2. Patient to follow-up with Urologist dx urinary retention  3. Patient to follow-up with Neurologist as directed dx recent CVA w/hemiparesis      Discharge Services: Home Care:  Occupational Therapy, Physical Therapy, Registered Nurse, Home Health Aide,  and From:  [facility]      Discharge Instructions Verbalized to Patient at Discharge:     Continue to follow your diet:  Regular with nectar thick liquids    Notify PCP if you have a fever greater than 100.5 degrees.     Oxygen at 1-3 liters/minute via nasal cannula.     24-hour supervision is recommended for safety.               TOTAL DISCHARGE TIME:   Greater than 30 minutes    Electronically signed by:  ELIZABETH Newsome CNP

## 2020-03-17 LAB
MDC_IDC_MSMT_BATTERY_STATUS: NORMAL
MDC_IDC_PG_IMPLANT_DTM: NORMAL
MDC_IDC_PG_MFG: NORMAL
MDC_IDC_PG_MODEL: NORMAL
MDC_IDC_PG_SERIAL: NORMAL
MDC_IDC_PG_TYPE: NORMAL
MDC_IDC_SESS_CLINIC_NAME: NORMAL
MDC_IDC_SESS_DTM: NORMAL
MDC_IDC_SESS_TYPE: NORMAL
MDC_IDC_SET_ZONE_DETECTION_INTERVAL: 2000 MS
MDC_IDC_SET_ZONE_DETECTION_INTERVAL: 3000 MS
MDC_IDC_SET_ZONE_DETECTION_INTERVAL: 390 MS
MDC_IDC_SET_ZONE_TYPE: NORMAL
MDC_IDC_STAT_AT_BURDEN_PERCENT: 0 %
MDC_IDC_STAT_AT_DTM_END: NORMAL
MDC_IDC_STAT_AT_DTM_START: NORMAL
MDC_IDC_STAT_EPISODE_RECENT_COUNT: 0
MDC_IDC_STAT_EPISODE_RECENT_COUNT_DTM_END: NORMAL
MDC_IDC_STAT_EPISODE_RECENT_COUNT_DTM_START: NORMAL
MDC_IDC_STAT_EPISODE_TOTAL_COUNT: 0
MDC_IDC_STAT_EPISODE_TOTAL_COUNT_DTM_END: NORMAL
MDC_IDC_STAT_EPISODE_TOTAL_COUNT_DTM_START: NORMAL
MDC_IDC_STAT_EPISODE_TYPE: NORMAL

## 2020-06-17 ENCOUNTER — ANCILLARY PROCEDURE (OUTPATIENT)
Dept: CARDIOLOGY | Facility: CLINIC | Age: 80
End: 2020-06-17
Attending: INTERNAL MEDICINE
Payer: COMMERCIAL

## 2020-06-17 DIAGNOSIS — R55 SYNCOPE: ICD-10-CM

## 2020-06-17 PROCEDURE — G2066 INTER DEVC REMOTE 30D: HCPCS | Performed by: INTERNAL MEDICINE

## 2020-06-17 PROCEDURE — 93297 REM INTERROG DEV EVAL ICPMS: CPT | Performed by: INTERNAL MEDICINE

## 2020-06-24 LAB
ALBUMIN UR-MCNC: 600 MG/DL
APPEARANCE UR: ABNORMAL
BACTERIA #/AREA URNS HPF: ABNORMAL /HPF
BILIRUB UR QL STRIP: NEGATIVE
COLOR UR AUTO: YELLOW
GLUCOSE UR STRIP-MCNC: NEGATIVE MG/DL
HGB UR QL STRIP: NEGATIVE
KETONES UR STRIP-MCNC: NEGATIVE MG/DL
LEUKOCYTE ESTERASE UR QL STRIP: ABNORMAL
MDC_IDC_MSMT_BATTERY_STATUS: NORMAL
MDC_IDC_PG_IMPLANT_DTM: NORMAL
MDC_IDC_PG_MFG: NORMAL
MDC_IDC_PG_MODEL: NORMAL
MDC_IDC_PG_SERIAL: NORMAL
MDC_IDC_PG_TYPE: NORMAL
MDC_IDC_SESS_CLINIC_NAME: NORMAL
MDC_IDC_SESS_DTM: NORMAL
MDC_IDC_SESS_TYPE: NORMAL
MDC_IDC_SET_ZONE_DETECTION_INTERVAL: 2000 MS
MDC_IDC_SET_ZONE_DETECTION_INTERVAL: 3000 MS
MDC_IDC_SET_ZONE_DETECTION_INTERVAL: 390 MS
MDC_IDC_SET_ZONE_TYPE: NORMAL
MDC_IDC_STAT_AT_BURDEN_PERCENT: 0 %
MDC_IDC_STAT_AT_DTM_END: NORMAL
MDC_IDC_STAT_AT_DTM_START: NORMAL
MDC_IDC_STAT_EPISODE_RECENT_COUNT: 0
MDC_IDC_STAT_EPISODE_RECENT_COUNT_DTM_END: NORMAL
MDC_IDC_STAT_EPISODE_RECENT_COUNT_DTM_START: NORMAL
MDC_IDC_STAT_EPISODE_TOTAL_COUNT: 0
MDC_IDC_STAT_EPISODE_TOTAL_COUNT: 2
MDC_IDC_STAT_EPISODE_TOTAL_COUNT_DTM_END: NORMAL
MDC_IDC_STAT_EPISODE_TOTAL_COUNT_DTM_START: NORMAL
MDC_IDC_STAT_EPISODE_TYPE: NORMAL
MUCOUS THREADS #/AREA URNS LPF: PRESENT /LPF
NITRATE UR QL: POSITIVE
PH UR STRIP: 8 PH (ref 5–7)
RBC #/AREA URNS AUTO: 0 /HPF (ref 0–2)
SOURCE: ABNORMAL
SP GR UR STRIP: 1.02 (ref 1–1.03)
TRI-PHOS CRY #/AREA URNS HPF: ABNORMAL /HPF
UROBILINOGEN UR STRIP-MCNC: NORMAL MG/DL (ref 0–2)
WBC #/AREA URNS AUTO: 27 /HPF (ref 0–5)

## 2020-06-24 PROCEDURE — 87186 SC STD MICRODIL/AGAR DIL: CPT | Performed by: FAMILY MEDICINE

## 2020-06-24 PROCEDURE — 87088 URINE BACTERIA CULTURE: CPT | Mod: GZ | Performed by: FAMILY MEDICINE

## 2020-06-24 PROCEDURE — 87086 URINE CULTURE/COLONY COUNT: CPT | Performed by: FAMILY MEDICINE

## 2020-06-24 PROCEDURE — 81001 URINALYSIS AUTO W/SCOPE: CPT | Performed by: FAMILY MEDICINE

## 2020-06-26 LAB
BACTERIA SPEC CULT: ABNORMAL
BACTERIA SPEC CULT: ABNORMAL
Lab: ABNORMAL
SPECIMEN SOURCE: ABNORMAL

## 2020-07-14 ENCOUNTER — TELEPHONE (OUTPATIENT)
Dept: MEDSURG UNIT | Facility: CLINIC | Age: 80
End: 2020-07-14

## 2020-09-17 ENCOUNTER — ANCILLARY PROCEDURE (OUTPATIENT)
Dept: CARDIOLOGY | Facility: CLINIC | Age: 80
End: 2020-09-17
Attending: INTERNAL MEDICINE
Payer: COMMERCIAL

## 2020-09-17 DIAGNOSIS — Z45.09 ENCOUNTER FOR LOOP RECORDER CHECK: ICD-10-CM

## 2020-09-17 DIAGNOSIS — R55 SYNCOPE: Primary | ICD-10-CM

## 2020-09-17 LAB
MDC_IDC_EPISODE_DTM: NORMAL
MDC_IDC_EPISODE_DURATION: 120 S
MDC_IDC_EPISODE_ID: 8
MDC_IDC_EPISODE_TYPE: NORMAL
MDC_IDC_MSMT_BATTERY_STATUS: NORMAL
MDC_IDC_PG_IMPLANT_DTM: NORMAL
MDC_IDC_PG_MFG: NORMAL
MDC_IDC_PG_MODEL: NORMAL
MDC_IDC_PG_SERIAL: NORMAL
MDC_IDC_PG_TYPE: NORMAL
MDC_IDC_SESS_CLINIC_NAME: NORMAL
MDC_IDC_SESS_DTM: NORMAL
MDC_IDC_SESS_TYPE: NORMAL
MDC_IDC_SET_ZONE_TYPE: NORMAL
MDC_IDC_STAT_AT_BURDEN_PERCENT: 0.06
MDC_IDC_STAT_AT_DTM_END: NORMAL
MDC_IDC_STAT_AT_DTM_START: NORMAL
MDC_IDC_STAT_EPISODE_RECENT_COUNT: 0
MDC_IDC_STAT_EPISODE_RECENT_COUNT: 4
MDC_IDC_STAT_EPISODE_RECENT_COUNT_DTM_END: NORMAL
MDC_IDC_STAT_EPISODE_RECENT_COUNT_DTM_START: NORMAL
MDC_IDC_STAT_EPISODE_TOTAL_COUNT: 0
MDC_IDC_STAT_EPISODE_TOTAL_COUNT: 8
MDC_IDC_STAT_EPISODE_TOTAL_COUNT_DTM_END: NORMAL
MDC_IDC_STAT_EPISODE_TOTAL_COUNT_DTM_START: NORMAL
MDC_IDC_STAT_EPISODE_TYPE: NORMAL

## 2020-09-17 PROCEDURE — 93298 REM INTERROG DEV EVAL SCRMS: CPT | Performed by: INTERNAL MEDICINE

## 2020-09-17 PROCEDURE — G2066 INTER DEVC REMOTE 30D: HCPCS | Performed by: INTERNAL MEDICINE

## 2020-11-09 ENCOUNTER — TELEPHONE (OUTPATIENT)
Dept: CARE COORDINATION | Facility: CLINIC | Age: 80
End: 2020-11-09

## 2020-12-17 ENCOUNTER — ANCILLARY PROCEDURE (OUTPATIENT)
Dept: CARDIOLOGY | Facility: CLINIC | Age: 80
End: 2020-12-17
Attending: INTERNAL MEDICINE
Payer: COMMERCIAL

## 2020-12-17 DIAGNOSIS — Z45.09 ENCOUNTER FOR LOOP RECORDER CHECK: ICD-10-CM

## 2020-12-17 PROCEDURE — 93297 REM INTERROG DEV EVAL ICPMS: CPT | Performed by: INTERNAL MEDICINE

## 2020-12-17 PROCEDURE — G2066 INTER DEVC REMOTE 30D: HCPCS | Performed by: INTERNAL MEDICINE

## 2020-12-21 LAB
MDC_IDC_MSMT_BATTERY_STATUS: NORMAL
MDC_IDC_PG_IMPLANT_DTM: NORMAL
MDC_IDC_PG_MFG: NORMAL
MDC_IDC_PG_MODEL: NORMAL
MDC_IDC_PG_SERIAL: NORMAL
MDC_IDC_PG_TYPE: NORMAL
MDC_IDC_SESS_CLINIC_NAME: NORMAL
MDC_IDC_SESS_DTM: NORMAL
MDC_IDC_SESS_TYPE: NORMAL
MDC_IDC_SET_ZONE_DETECTION_INTERVAL: 2000 MS
MDC_IDC_SET_ZONE_DETECTION_INTERVAL: 3000 MS
MDC_IDC_SET_ZONE_DETECTION_INTERVAL: 390 MS
MDC_IDC_SET_ZONE_TYPE: NORMAL
MDC_IDC_STAT_AT_BURDEN_PERCENT: 0 %
MDC_IDC_STAT_AT_DTM_END: NORMAL
MDC_IDC_STAT_AT_DTM_START: NORMAL
MDC_IDC_STAT_EPISODE_RECENT_COUNT: 0
MDC_IDC_STAT_EPISODE_RECENT_COUNT_DTM_END: NORMAL
MDC_IDC_STAT_EPISODE_RECENT_COUNT_DTM_START: NORMAL
MDC_IDC_STAT_EPISODE_TOTAL_COUNT: 0
MDC_IDC_STAT_EPISODE_TOTAL_COUNT: 9
MDC_IDC_STAT_EPISODE_TOTAL_COUNT_DTM_END: NORMAL
MDC_IDC_STAT_EPISODE_TOTAL_COUNT_DTM_START: NORMAL
MDC_IDC_STAT_EPISODE_TYPE: NORMAL

## 2021-01-12 ENCOUNTER — TELEPHONE (OUTPATIENT)
Dept: CARE COORDINATION | Facility: CLINIC | Age: 81
End: 2021-01-12

## 2021-02-08 ENCOUNTER — APPOINTMENT (OUTPATIENT)
Dept: GENERAL RADIOLOGY | Facility: CLINIC | Age: 81
DRG: 698 | End: 2021-02-08
Attending: EMERGENCY MEDICINE
Payer: COMMERCIAL

## 2021-02-08 ENCOUNTER — APPOINTMENT (OUTPATIENT)
Dept: CT IMAGING | Facility: CLINIC | Age: 81
DRG: 698 | End: 2021-02-08
Attending: HOSPITALIST
Payer: COMMERCIAL

## 2021-02-08 ENCOUNTER — HOSPITAL ENCOUNTER (INPATIENT)
Facility: CLINIC | Age: 81
LOS: 2 days | Discharge: HOME HEALTH | DRG: 698 | End: 2021-02-10
Attending: EMERGENCY MEDICINE | Admitting: HOSPITALIST
Payer: COMMERCIAL

## 2021-02-08 DIAGNOSIS — N39.0 URINARY TRACT INFECTION WITH HEMATURIA, SITE UNSPECIFIED: ICD-10-CM

## 2021-02-08 DIAGNOSIS — J44.9 CHRONIC OBSTRUCTIVE PULMONARY DISEASE, UNSPECIFIED COPD TYPE (H): Primary | ICD-10-CM

## 2021-02-08 DIAGNOSIS — R31.9 URINARY TRACT INFECTION WITH HEMATURIA, SITE UNSPECIFIED: ICD-10-CM

## 2021-02-08 DIAGNOSIS — G93.41 METABOLIC ENCEPHALOPATHY: ICD-10-CM

## 2021-02-08 DIAGNOSIS — I63.9 CEREBROVASCULAR ACCIDENT (CVA), UNSPECIFIED MECHANISM (H): ICD-10-CM

## 2021-02-08 LAB
ALBUMIN SERPL-MCNC: 2.3 G/DL (ref 3.4–5)
ALBUMIN UR-MCNC: 300 MG/DL
ALP SERPL-CCNC: 62 U/L (ref 40–150)
ALT SERPL W P-5'-P-CCNC: 12 U/L (ref 0–70)
ANION GAP SERPL CALCULATED.3IONS-SCNC: 4 MMOL/L (ref 3–14)
APPEARANCE UR: ABNORMAL
AST SERPL W P-5'-P-CCNC: 9 U/L (ref 0–45)
BACTERIA #/AREA URNS HPF: ABNORMAL /HPF
BASE EXCESS BLDV CALC-SCNC: 3 MMOL/L
BASOPHILS # BLD AUTO: 0 10E9/L (ref 0–0.2)
BASOPHILS NFR BLD AUTO: 0.2 %
BILIRUB DIRECT SERPL-MCNC: 0.3 MG/DL (ref 0–0.2)
BILIRUB SERPL-MCNC: 0.8 MG/DL (ref 0.2–1.3)
BILIRUB UR QL STRIP: NEGATIVE
BUN SERPL-MCNC: 29 MG/DL (ref 7–30)
CALCIUM SERPL-MCNC: 8.9 MG/DL (ref 8.5–10.1)
CHLORIDE SERPL-SCNC: 105 MMOL/L (ref 94–109)
CO2 SERPL-SCNC: 29 MMOL/L (ref 20–32)
COLOR UR AUTO: YELLOW
CREAT SERPL-MCNC: 1 MG/DL (ref 0.66–1.25)
DIFFERENTIAL METHOD BLD: ABNORMAL
EOSINOPHIL # BLD AUTO: 0 10E9/L (ref 0–0.7)
EOSINOPHIL NFR BLD AUTO: 0 %
ERYTHROCYTE [DISTWIDTH] IN BLOOD BY AUTOMATED COUNT: 14.4 % (ref 10–15)
GFR SERPL CREATININE-BSD FRML MDRD: 72 ML/MIN/{1.73_M2}
GLUCOSE BLDC GLUCOMTR-MCNC: 118 MG/DL (ref 70–99)
GLUCOSE SERPL-MCNC: 146 MG/DL (ref 70–99)
GLUCOSE UR STRIP-MCNC: NEGATIVE MG/DL
HBA1C MFR BLD: 6 % (ref 0–5.6)
HCO3 BLDV-SCNC: 29 MMOL/L (ref 21–28)
HCT VFR BLD AUTO: 46.6 % (ref 40–53)
HGB BLD-MCNC: 14.1 G/DL (ref 13.3–17.7)
HGB UR QL STRIP: ABNORMAL
IMM GRANULOCYTES # BLD: 0.1 10E9/L (ref 0–0.4)
IMM GRANULOCYTES NFR BLD: 0.5 %
KETONES UR STRIP-MCNC: NEGATIVE MG/DL
LABORATORY COMMENT REPORT: NORMAL
LACTATE BLD-SCNC: 1.1 MMOL/L (ref 0.7–2)
LACTATE BLD-SCNC: 2.3 MMOL/L (ref 0.7–2)
LEUKOCYTE ESTERASE UR QL STRIP: ABNORMAL
LYMPHOCYTES # BLD AUTO: 0.8 10E9/L (ref 0.8–5.3)
LYMPHOCYTES NFR BLD AUTO: 6 %
MCH RBC QN AUTO: 28 PG (ref 26.5–33)
MCHC RBC AUTO-ENTMCNC: 30.3 G/DL (ref 31.5–36.5)
MCV RBC AUTO: 93 FL (ref 78–100)
MONOCYTES # BLD AUTO: 1.6 10E9/L (ref 0–1.3)
MONOCYTES NFR BLD AUTO: 12.4 %
NEUTROPHILS # BLD AUTO: 10.2 10E9/L (ref 1.6–8.3)
NEUTROPHILS NFR BLD AUTO: 80.9 %
NITRATE UR QL: POSITIVE
NRBC # BLD AUTO: 0 10*3/UL
NRBC BLD AUTO-RTO: 0 /100
O2/TOTAL GAS SETTING VFR VENT: ABNORMAL %
OXYHGB MFR BLDV: 95 %
PCO2 BLDV: 49 MM HG (ref 40–50)
PH BLDV: 7.38 PH (ref 7.32–7.43)
PH UR STRIP: 7.5 PH (ref 5–7)
PLATELET # BLD AUTO: 207 10E9/L (ref 150–450)
PO2 BLDV: 100 MM HG (ref 25–47)
POTASSIUM SERPL-SCNC: 3.9 MMOL/L (ref 3.4–5.3)
PROT SERPL-MCNC: 7.7 G/DL (ref 6.8–8.8)
RBC # BLD AUTO: 5.03 10E12/L (ref 4.4–5.9)
RBC #/AREA URNS AUTO: >182 /HPF (ref 0–2)
SARS-COV-2 RNA RESP QL NAA+PROBE: NEGATIVE
SODIUM SERPL-SCNC: 138 MMOL/L (ref 133–144)
SOURCE: ABNORMAL
SP GR UR STRIP: 1.02 (ref 1–1.03)
SPECIMEN SOURCE: NORMAL
UROBILINOGEN UR STRIP-MCNC: 0 MG/DL (ref 0–2)
WBC # BLD AUTO: 12.6 10E9/L (ref 4–11)
WBC #/AREA URNS AUTO: >182 /HPF (ref 0–5)
WBC CLUMPS #/AREA URNS HPF: PRESENT /HPF

## 2021-02-08 PROCEDURE — C9803 HOPD COVID-19 SPEC COLLECT: HCPCS

## 2021-02-08 PROCEDURE — 87086 URINE CULTURE/COLONY COUNT: CPT | Performed by: EMERGENCY MEDICINE

## 2021-02-08 PROCEDURE — 80048 BASIC METABOLIC PNL TOTAL CA: CPT | Performed by: EMERGENCY MEDICINE

## 2021-02-08 PROCEDURE — 36415 COLL VENOUS BLD VENIPUNCTURE: CPT | Performed by: HOSPITALIST

## 2021-02-08 PROCEDURE — 70450 CT HEAD/BRAIN W/O DYE: CPT

## 2021-02-08 PROCEDURE — 85025 COMPLETE CBC W/AUTO DIFF WBC: CPT | Performed by: EMERGENCY MEDICINE

## 2021-02-08 PROCEDURE — 96365 THER/PROPH/DIAG IV INF INIT: CPT

## 2021-02-08 PROCEDURE — 83605 ASSAY OF LACTIC ACID: CPT | Performed by: HOSPITALIST

## 2021-02-08 PROCEDURE — 258N000003 HC RX IP 258 OP 636: Performed by: EMERGENCY MEDICINE

## 2021-02-08 PROCEDURE — 87635 SARS-COV-2 COVID-19 AMP PRB: CPT | Performed by: EMERGENCY MEDICINE

## 2021-02-08 PROCEDURE — 999N001017 HC STATISTIC GLUCOSE BY METER IP

## 2021-02-08 PROCEDURE — 87186 SC STD MICRODIL/AGAR DIL: CPT | Performed by: EMERGENCY MEDICINE

## 2021-02-08 PROCEDURE — 81001 URINALYSIS AUTO W/SCOPE: CPT | Performed by: EMERGENCY MEDICINE

## 2021-02-08 PROCEDURE — 96361 HYDRATE IV INFUSION ADD-ON: CPT

## 2021-02-08 PROCEDURE — 87040 BLOOD CULTURE FOR BACTERIA: CPT | Performed by: EMERGENCY MEDICINE

## 2021-02-08 PROCEDURE — 36415 COLL VENOUS BLD VENIPUNCTURE: CPT

## 2021-02-08 PROCEDURE — 250N000011 HC RX IP 250 OP 636: Performed by: HOSPITALIST

## 2021-02-08 PROCEDURE — 83605 ASSAY OF LACTIC ACID: CPT | Performed by: EMERGENCY MEDICINE

## 2021-02-08 PROCEDURE — 120N000001 HC R&B MED SURG/OB

## 2021-02-08 PROCEDURE — 99223 1ST HOSP IP/OBS HIGH 75: CPT | Mod: AI | Performed by: HOSPITALIST

## 2021-02-08 PROCEDURE — 999N000157 HC STATISTIC RCP TIME EA 10 MIN

## 2021-02-08 PROCEDURE — 82805 BLOOD GASES W/O2 SATURATION: CPT | Performed by: HOSPITALIST

## 2021-02-08 PROCEDURE — 83036 HEMOGLOBIN GLYCOSYLATED A1C: CPT | Performed by: EMERGENCY MEDICINE

## 2021-02-08 PROCEDURE — 250N000013 HC RX MED GY IP 250 OP 250 PS 637: Performed by: HOSPITALIST

## 2021-02-08 PROCEDURE — 71045 X-RAY EXAM CHEST 1 VIEW: CPT

## 2021-02-08 PROCEDURE — 258N000003 HC RX IP 258 OP 636: Performed by: HOSPITALIST

## 2021-02-08 PROCEDURE — 87088 URINE BACTERIA CULTURE: CPT | Performed by: EMERGENCY MEDICINE

## 2021-02-08 PROCEDURE — 99285 EMERGENCY DEPT VISIT HI MDM: CPT | Mod: 25

## 2021-02-08 PROCEDURE — 250N000011 HC RX IP 250 OP 636: Performed by: EMERGENCY MEDICINE

## 2021-02-08 PROCEDURE — 80076 HEPATIC FUNCTION PANEL: CPT | Performed by: EMERGENCY MEDICINE

## 2021-02-08 RX ORDER — DEXTROSE MONOHYDRATE 25 G/50ML
25-50 INJECTION, SOLUTION INTRAVENOUS
Status: DISCONTINUED | OUTPATIENT
Start: 2021-02-08 | End: 2021-02-10 | Stop reason: HOSPADM

## 2021-02-08 RX ORDER — ATORVASTATIN CALCIUM 10 MG/1
10 TABLET, FILM COATED ORAL DAILY
COMMUNITY
End: 2021-04-13

## 2021-02-08 RX ORDER — ONDANSETRON 2 MG/ML
4 INJECTION INTRAMUSCULAR; INTRAVENOUS EVERY 6 HOURS PRN
Status: DISCONTINUED | OUTPATIENT
Start: 2021-02-08 | End: 2021-02-10 | Stop reason: HOSPADM

## 2021-02-08 RX ORDER — PAROXETINE 20 MG/1
30 TABLET, FILM COATED ORAL DAILY
COMMUNITY
End: 2021-11-08

## 2021-02-08 RX ORDER — NICOTINE POLACRILEX 4 MG
15-30 LOZENGE BUCCAL
Status: DISCONTINUED | OUTPATIENT
Start: 2021-02-08 | End: 2021-02-10 | Stop reason: HOSPADM

## 2021-02-08 RX ORDER — ACETAMINOPHEN 650 MG/1
650 SUPPOSITORY RECTAL EVERY 4 HOURS PRN
Status: DISCONTINUED | OUTPATIENT
Start: 2021-02-08 | End: 2021-02-10 | Stop reason: HOSPADM

## 2021-02-08 RX ORDER — PIPERACILLIN SODIUM, TAZOBACTAM SODIUM 4; .5 G/20ML; G/20ML
4.5 INJECTION, POWDER, LYOPHILIZED, FOR SOLUTION INTRAVENOUS EVERY 8 HOURS
Status: DISCONTINUED | OUTPATIENT
Start: 2021-02-08 | End: 2021-02-08

## 2021-02-08 RX ORDER — IPRATROPIUM BROMIDE AND ALBUTEROL SULFATE 2.5; .5 MG/3ML; MG/3ML
1 SOLUTION RESPIRATORY (INHALATION) EVERY 4 HOURS PRN
Status: DISCONTINUED | OUTPATIENT
Start: 2021-02-08 | End: 2021-02-10 | Stop reason: HOSPADM

## 2021-02-08 RX ORDER — ALLOPURINOL 300 MG/1
300 TABLET ORAL DAILY
Status: DISCONTINUED | OUTPATIENT
Start: 2021-02-09 | End: 2021-02-10 | Stop reason: HOSPADM

## 2021-02-08 RX ORDER — PIPERACILLIN SODIUM, TAZOBACTAM SODIUM 4; .5 G/20ML; G/20ML
4.5 INJECTION, POWDER, LYOPHILIZED, FOR SOLUTION INTRAVENOUS EVERY 6 HOURS
Status: DISCONTINUED | OUTPATIENT
Start: 2021-02-08 | End: 2021-02-10

## 2021-02-08 RX ORDER — METOPROLOL TARTRATE 25 MG/1
25 TABLET, FILM COATED ORAL 2 TIMES DAILY
Status: DISCONTINUED | OUTPATIENT
Start: 2021-02-08 | End: 2021-02-10 | Stop reason: HOSPADM

## 2021-02-08 RX ORDER — LIDOCAINE 40 MG/G
CREAM TOPICAL
Status: DISCONTINUED | OUTPATIENT
Start: 2021-02-08 | End: 2021-02-10 | Stop reason: HOSPADM

## 2021-02-08 RX ORDER — ACETAMINOPHEN 325 MG/1
650 TABLET ORAL EVERY 4 HOURS PRN
Status: DISCONTINUED | OUTPATIENT
Start: 2021-02-08 | End: 2021-02-10 | Stop reason: HOSPADM

## 2021-02-08 RX ORDER — ATORVASTATIN CALCIUM 10 MG/1
10 TABLET, FILM COATED ORAL DAILY
Status: DISCONTINUED | OUTPATIENT
Start: 2021-02-09 | End: 2021-02-10 | Stop reason: HOSPADM

## 2021-02-08 RX ORDER — ALLOPURINOL 300 MG/1
300 TABLET ORAL EVERY MORNING
COMMUNITY

## 2021-02-08 RX ORDER — PAROXETINE 20 MG/1
20 TABLET, FILM COATED ORAL DAILY
Status: DISCONTINUED | OUTPATIENT
Start: 2021-02-09 | End: 2021-02-10 | Stop reason: HOSPADM

## 2021-02-08 RX ORDER — POLYETHYLENE GLYCOL 3350 17 G/17G
17 POWDER, FOR SOLUTION ORAL DAILY PRN
Status: DISCONTINUED | OUTPATIENT
Start: 2021-02-08 | End: 2021-02-10 | Stop reason: HOSPADM

## 2021-02-08 RX ORDER — AMOXICILLIN 250 MG
1 CAPSULE ORAL 2 TIMES DAILY PRN
Status: DISCONTINUED | OUTPATIENT
Start: 2021-02-08 | End: 2021-02-10 | Stop reason: HOSPADM

## 2021-02-08 RX ORDER — AMOXICILLIN 250 MG
2 CAPSULE ORAL 2 TIMES DAILY PRN
Status: DISCONTINUED | OUTPATIENT
Start: 2021-02-08 | End: 2021-02-10 | Stop reason: HOSPADM

## 2021-02-08 RX ORDER — ONDANSETRON 4 MG/1
4 TABLET, ORALLY DISINTEGRATING ORAL EVERY 6 HOURS PRN
Status: DISCONTINUED | OUTPATIENT
Start: 2021-02-08 | End: 2021-02-10 | Stop reason: HOSPADM

## 2021-02-08 RX ORDER — SODIUM CHLORIDE 9 MG/ML
INJECTION, SOLUTION INTRAVENOUS CONTINUOUS
Status: DISCONTINUED | OUTPATIENT
Start: 2021-02-08 | End: 2021-02-08

## 2021-02-08 RX ORDER — ACETAMINOPHEN 325 MG/1
650 TABLET ORAL EVERY 4 HOURS PRN
Status: DISCONTINUED | OUTPATIENT
Start: 2021-02-08 | End: 2021-02-08

## 2021-02-08 RX ORDER — SODIUM CHLORIDE 9 MG/ML
INJECTION, SOLUTION INTRAVENOUS CONTINUOUS
Status: DISCONTINUED | OUTPATIENT
Start: 2021-02-08 | End: 2021-02-10

## 2021-02-08 RX ORDER — CEFTRIAXONE 2 G/1
2 INJECTION, POWDER, FOR SOLUTION INTRAMUSCULAR; INTRAVENOUS ONCE
Status: COMPLETED | OUTPATIENT
Start: 2021-02-08 | End: 2021-02-08

## 2021-02-08 RX ORDER — HYDRALAZINE HYDROCHLORIDE 20 MG/ML
10 INJECTION INTRAMUSCULAR; INTRAVENOUS EVERY 4 HOURS PRN
Status: DISCONTINUED | OUTPATIENT
Start: 2021-02-08 | End: 2021-02-10 | Stop reason: HOSPADM

## 2021-02-08 RX ADMIN — PIPERACILLIN SODIUM AND TAZOBACTAM SODIUM 4.5 G: 4; .5 INJECTION, POWDER, LYOPHILIZED, FOR SOLUTION INTRAVENOUS at 20:13

## 2021-02-08 RX ADMIN — SODIUM CHLORIDE 1000 ML: 9 INJECTION, SOLUTION INTRAVENOUS at 15:33

## 2021-02-08 RX ADMIN — SODIUM CHLORIDE: 9 INJECTION, SOLUTION INTRAVENOUS at 20:15

## 2021-02-08 RX ADMIN — METOPROLOL TARTRATE 25 MG: 25 TABLET, FILM COATED ORAL at 20:11

## 2021-02-08 RX ADMIN — ACETAMINOPHEN 650 MG: 325 TABLET, FILM COATED ORAL at 20:11

## 2021-02-08 RX ADMIN — CEFTRIAXONE SODIUM 2 G: 2 INJECTION, POWDER, FOR SOLUTION INTRAMUSCULAR; INTRAVENOUS at 16:45

## 2021-02-08 RX ADMIN — SODIUM CHLORIDE: 9 INJECTION, SOLUTION INTRAVENOUS at 18:49

## 2021-02-08 ASSESSMENT — ACTIVITIES OF DAILY LIVING (ADL): ADLS_ACUITY_SCORE: 26

## 2021-02-08 ASSESSMENT — MIFFLIN-ST. JEOR: SCORE: 1982.84

## 2021-02-08 NOTE — ED NOTES
Bed: ED20  Expected date:   Expected time:   Means of arrival:   Comments:  [facility] Confusion  Possible  M   HX Severe Anxiety and depression  - pt w/labile moods throughout admission but on 11/10 endorsed SI (stated she wanted to sign out AMA and overdose on Klonopin); constant observation ordered,  As per psych today am; constant obersation can be removed; however pt  with SI again as per nurse; will reconsult psych again; f/u  - c/w home citalopram 20mg daily, trazodone 50mg QHS  - c/w Klonopin 1mg BID increased to TID PRN (home dose)

## 2021-02-08 NOTE — ED NOTES
[facility]  ED Nurse Handoff Report    ED Chief complaint: Confusion      ED Diagnosis:   Final diagnoses:   Urinary tract infection with hematuria, site unspecified   Metabolic encephalopathy       Code Status: Full Code    Allergies: No Known Allergies    Patient Story: confusion  Focused Assessment:  Patient presents to ED via EMS for increased confusion and lethargy over last few days. Patient with chronic chirinos catheter since last stroke and urine in drainage bag was cloudy upon ED arrival. Chirinos catheter was replaced in ED. Family reports left sided residual from stroke.     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               Family Comments: na  OBS brochure/video discussed/provided to patient/family: Yes              Name of person given brochure if not patient: na              Relationship to patient: na    For the majority of the shift this patient's behavior was Green.   Behavioral interventions performed were none.    ED NURSE PHONE NUMBER: *[phone]

## 2021-02-08 NOTE — ED NOTES
Patient changed into gown and cleaned up. Patient noted to be incontinent. Assisted RN with chirinos catheter replacement. Patient tolerated well. Medical student at bedside now.

## 2021-02-08 NOTE — H&P
[facility]    History and Physical - Hospitalist Service       Date of Admission:  2/8/2021    Assessment & Plan   Ron Gilmore is a 80 year old male admitted on 2/8/2021.     Sepsis most likely secondary to catheter associated urinary tract infection  Metabolic encephalopathy  Lactic acidosis  Patient acutely confused with nonfocal exam outside of his usual left hemiplegia after his stroke  Found to have fever  Chest x-ray is clear, patient has no urinary complaints  UA from a catheterized specimen in the setting of chronic Cardona was grossly abnormal  Ceftriaxone ordered in the emergency department  Most likely is septic from a urinary source with his fever of 100.1, and leukocytosis of 12.4.  He appears systemically toxic and ill.  He has a history of encephalopathy secondary to carbon dioxide narcosis in the setting of untreated sleep apnea  Plan  -Admit to inpatient  -Zosyn  -Add-on urine culture and follow-up on the blood cultures  -VBG for baseline CO2 levels  -He will need his urinary catheter exchanged      Hypoxia in the setting of obstructive sleep apnea  Patient is on chronic 2 L of oxygen  He is not on oxygen prior to hospitalization   has been getting IV fluids since admission. Oxygenation improved with IV lasix  Plan:  -Resume CPAP machine at night  -Chest x-ray noted to be clear     HTN  Resume home meds once verified     Depression  Resume home meds once verified       BPH  Resume home meds once verified     COPD/BRAD  - Albuterol as needed  - Resume meds once verified  - continue CPAP at night             Diet:   npo pending SLP  DVT Prophylaxis: Pneumatic Compression Devices  Cardona Catheter: in place, indication:    Code Status:   full code per wife         Disposition Plan   Expected discharge: 2 - 3 days, recommended to prior living arrangement once SIRS/Sepsis treated.  Entered: Rg Tobin DO 02/08/2021, 5:44 PM     The patient's care was discussed with the  Patient and Patient's Family.    Rg Tobin [facility]  Contact information available via [facility] Paging/Directory      ______________________________________________________________________    Chief Complaint   confusion    History is obtained from the patient's wife    History of Present Illness   Ron Gilmore is a 80 year old male with past medical history of left-sided hemiplegia after a stroke in 1/2020, BRAD, COPD on 2 L chronically, and chronic urinary retention with chronic Cardona who comes in from home with his wife with 1 day of worsening confusion.  He also notes high fevers up to about 101.  The Cardona was last exchanged 2 weeks ago.  At baseline the patient does not walk secondary to his severe hemiplegia.  She notes that he has been strong and has avoided hospitalization largely in the last year after his large stroke.    In the emergency department he was found to have likely urinary tract infection and was started on ceftriaxone.    Review of Systems    Review of systems not obtained due to patient factors - confusion    Past Medical History    I have reviewed this patient's medical history and updated it with pertinent information if needed.   Past Medical History:   Diagnosis Date     BPH (benign prostatic hyperplasia)      Cataract      Cholelithiasis      COPD (chronic obstructive pulmonary disease) (H)      Depression      Diabetes mellitus     Type 2     Dyshidrotic foot dermatitis      Edema      Gout      Hyperlipidemia      Hypertension      Kidney stones     Bladder Stones     Lumbago      Lumbar disc displacement without myelopathy      Muscle weakness      Neuropathy, diabetic (H)      Obesity      Spinal stenosis      Stroke (H)     with residual effects- weakness LUE> LLE     Unsteady gait      Urinary retention with incomplete bladder emptying      UTI (urinary tract infection)        Past Surgical History   I have reviewed this patient's surgical  history and updated it with pertinent information if needed.  Past Surgical History:   Procedure Laterality Date     APPENDECTOMY OPEN       ARTHROSCOPY SHOULDER ROTATOR CUFF REPAIR       cataracts Bilateral      CHOLECYSTECTOMY       COLONOSCOPY       CYSTOSCOPY  10/19/2011    Procedure:CYSTOSCOPY; CYSTOSCOPY, BLADDER STONE REMOVAL; Surgeon:ROB SAWYER; Location: OR     CYSTOSCOPY, TRANSURETHRAL RESECTION (TUR) PROSTATE, COMBINED N/A 2/21/2018    Procedure: COMBINED CYSTOSCOPY, TRANSURETHRAL RESECTION (TUR) PROSTATE;  COMBINED CYSTOSCOPY, TRANSURETHRAL RESECTION (TUR) PROSTATE ;  Surgeon: Rob Sawyer MD;  Location:  OR     EP LOOP RECORDER IMPLANT N/A 1/20/2020    Procedure: EP Loop Recorder Implant;  Surgeon: Evgeny Parisi MD;  Location:  HEART CARDIAC CATH LAB     EYE SURGERY      right lid surgery      JOINT REPLACEMENT Right     HIP     KNEE SURGERY Bilateral      LAMINECTOMY LUMBAR ONE LEVEL       TONSILLECTOMY         Social History   I have reviewed this patient's social history and updated it with pertinent information if needed.  Social History     Tobacco Use     Smoking status: Former Smoker     Smokeless tobacco: Never Used   Substance Use Topics     Alcohol use: No     Comment: none for 29 yrs     Drug use: No       Family History   I have reviewed this patient's family history and updated it with pertinent information if needed.  Family History   Problem Relation Age of Onset     Prostate Cancer Father        Prior to Admission Medications   Prior to Admission Medications   Prescriptions Last Dose Informant Patient Reported? Taking?   ALLOPURINOL PO  Pharmacy Yes No   Sig: Take 300 mg by mouth daily   Emollient (AMLACTIN ULTRA EX)  Self Yes No   Sig: Externally apply topically daily APPLY TO FEET    PARoxetine HCl (PAXIL PO)  Pharmacy Yes No   Sig: Take 30 mg by mouth daily    acetaminophen (TYLENOL) 325 MG tablet   Yes No   Sig: Take 650 mg by mouth every 4 hours as needed for  mild pain as needed for pain/fever Max dose 3000mg/24hr   aspirin (ASA) 325 MG EC tablet   No No   Sig: Take 1 tablet (325 mg) by mouth daily   atorvastatin (LIPITOR) 10 MG tablet   No No   Sig: Take 1 tablet (10 mg) by mouth every evening   ipratropium - albuterol 0.5 mg/2.5 mg/3 mL 0.5-2.5 (3) MG/3ML IN neb solution   Yes No   Sig: Take 1 vial by nebulization 3 times daily And q4hrs prn   metoprolol tartrate (LOPRESSOR) 25 MG tablet   No No   Sig: Take 1 tablet (25 mg) by mouth 2 times daily   tamsulosin (FLOMAX) 0.4 MG capsule   Yes No   Sig: Take 0.8 mg by mouth daily      Facility-Administered Medications: None     Allergies   No Known Allergies    Physical Exam   Vital Signs: Temp: 100.1  F (37.8  C) Temp src: Oral BP: (!) 154/99 Pulse: 115   Resp: 16 SpO2: 94 % O2 Device: Nasal cannula Oxygen Delivery: 2 LPM  Weight: 270 lbs 0 oz    General:  Lying down in the hospital bed the patient looks like he is in mild distress.  He appears confused  Eyes: Tracking well, clear conjunctiva BL  ENT: MMM, midline trachea.   Lungs:   No tachypnea, no accessory muscle use. No respiratory distress.   CV: Rate as above, regular rhythm.    Abd: Soft, non tender, no guarding, no rebound. Non distended  MSK: no peripheral edema or joint effusion.  No evidence of trauma  Skin: No rash, normal turgor and temperature  Neuro: Clear speech and no facial droop.  Left-sided hemiplegia noted in baseline.  Oriented to person only  Psych: Not RIS, no e/o AH/    Data   Data reviewed today: I reviewed all medications, new labs and imaging results over the last 24 hours. I personally reviewed the chest x-ray image(s) showing Clear lungs.    Recent Labs   Lab 02/08/21  1523   WBC 12.6*   HGB 14.1   MCV 93         POTASSIUM 3.9   CHLORIDE 105   CO2 29   BUN 29   CR 1.00   ANIONGAP 4   RAJ 8.9   *   ALBUMIN 2.3*   PROTTOTAL 7.7   BILITOTAL 0.8   ALKPHOS 62   ALT 12   AST 9     Most Recent 3 CBC's:  Recent Labs   Lab Test  02/08/21  1523 03/07/20  0900 03/03/20  0829   WBC 12.6* 6.6 8.5   HGB 14.1 12.6* 12.9*   MCV 93 95 94    180 178     Most Recent 3 BMP's:  Recent Labs   Lab Test 02/08/21  1523 03/11/20  0738 03/03/20  0829    139 139   POTASSIUM 3.9 4.4 3.8   CHLORIDE 105 105 103   CO2 29 33* 33*   BUN 29 24 23   CR 1.00 0.60* 0.72   ANIONGAP 4 1* 3   RAJ 8.9 8.7 8.8   * 102* 103*     Most Recent 2 LFT's:  Recent Labs   Lab Test 02/08/21  1523 10/12/18  2032   AST 9 22   ALT 12 34   ALKPHOS 62 57   BILITOTAL 0.8 0.5     Most Recent 3 INR's:  Recent Labs   Lab Test 01/14/20  2356   INR 1.13     Recent Results (from the past 24 hour(s))   XR Chest Port 1 View    Narrative    CHEST ONE VIEW PORTABLE   2/8/2021 4:10 PM     HISTORY: Altered mental status and fever.    COMPARISON: 1/21/2020      Impression    IMPRESSION: Unremarkable single view of the chest.    VENANCIO MUÑOZ MD

## 2021-02-08 NOTE — ED PROVIDER NOTES
History     Chief Complaint:  Confusion       The history is provided by the patient and the spouse. The history is limited by the condition of the patient.     Ron Gilmore is a 80 year old male with a history of COPD, UTI, CVA, and hypertension among others who presents for evaluation of confusion. The patient's wife reports that the patient has had increasing confusion over the past few days. He has a Cardona catheter in at home which was last replaced two weeks ago and he is also on 2L O2 at baseline. He currently lives at home with his wife.       Review of Systems   Unable to perform ROS: Mental status change         Allergies:  No Known Drug Allergies      Medications:   Allopurinol   Aspirin 325 mg   Atorvastatin  Emollient  Ipratropium-albuterol nebulizer solution  Paroxetine  Tamsulosin  Metoprolol Tartrate     Medical History:   BPH  Cataracts  Depression   COPD  Diabetes mellitus - type 2   Edema  Gout  Hyperlipidemia   Hypertension   Nephrolithiasis   Muscle weakness   Lumbago   Obesity   Spinal stenosis   Unsteady gate  Stroke  Obesity   UTI  Urinary retention   Pneumonia  SIRS  Left sided weakness     Surgical History:  Appendectomy   Shoulder rotator cuff repair  Cataracts - bilateral   Cholecystectomy  Colonoscopy   Cystoscopy, bladder stone removal  Cystoscopy, TURP combined  Loop recorder implantation   Right eyelid surgery   Right hip replacement  Knee surgery - bilateral   Laminectomy lumbar one level  Tonsillectomy   Wound exploration with I&D    Family History:   Father -  Prostate cancer     Social History:  The patient was accompanied to the ED by EMS.  The patient currently lives at home with his wife.   Marital Status:   PCP: Augustin Thomas        Physical Exam     Patient Vitals for the past 24 hrs:   BP Temp Temp src Pulse Resp SpO2 Weight   02/08/21 1517 135/89 100.1  F (37.8  C) Oral 125 16 95 % 122.5 kg (270 lb)        Physical Exam  Vitals: reviewed by me  General: Pt  "seen on [facility], pleasant, cooperative, and alert to conversation,, slightly confused however.  Keeps saying \"the Super Bowl\" when asked what month it is.  Eyes: Tracking well, clear conjunctiva BL  ENT: MMM, midline trachea.   Lungs:   No tachypnea, no accessory muscle use. No respiratory distress.   CV: Rate as above, regular rhythm.    Abd: Soft, non tender, no guarding, no rebound. Non distended  MSK: no peripheral edema or joint effusion.  No evidence of trauma  Skin: No rash, normal turgor and temperature  Neuro: Clear speech and no facial droop.  Psych: Not RIS, no e/o AH/VH      Emergency Department Course     Imaging:    XR Chest Port 1 view:   IMPRESSION: Unremarkable single view of the chest.  Reading per radiology.     Laboratory:    CBC: WBC 12.6 (H), HGB 14.1,   BMP: Glucose 146 (H), (Creatinine 1.00) o/w WNL   Lactic Acid (Resulted 1537): 2.3 (H)   Hepatic Panel: Bilirubin Direct 0.3 (H), Albumin 2.3 (L), o/w WNL  Blood Culture x2: Pending     UA with Microscopic: Blood Large (A), pH 7.5 (H), Protein Albumin 300 (A), Nitrite Positive (A), Leukocyte Esterase Large (A), WBC/HPF > 182 (H), RBC/HPF >182 (H), WBC Clumps Present (A), Bacteria Moderate (A), o/w WNL     Emergency Department Course:    Reviewed:  I reviewed the patient's nursing notes, vitals, past medical records, Care Everywhere.     Assessments:   I performed an exam of the patient, as documented above.   1622 I spoke with the patient's wife to obtain additional history and to update her on the patient's presentation, findings, and plan of care including admission.     Consults:   1658 I spoke with Dr. Tobin of the hospitalist service regarding patient's presentation, findings, and plan of care. They are in agreement to accept the patient for admission to a bed for further monitoring, care, and treatment.      Interventions:  1533 Normal Saline 1000 mL IV   1645 Rocephin 2 g IV    Disposition:  The patient was admitted " to the hospital under the care of Dr. Tobin.     Impression & Plan     Medical Decision Making:  This is a very pleasant 80 year old male who presents to the emergency room with what appears to be confusion due to a urinary tract infection. His wife tells me that the patient has had a Cardona in place since his stroke a year ago, and I do think that this is the reason for his confusion today. He has a fever, a positive urinalysis, an elevated lactate, and a slight elevation in his white count. I do think he needs to be admitted, he will be treated for UTI and metabolic encephalopathy. He is otherwise able to answer yes and no to appropriate questions, can tell me that it is the day after the Super Bowl, and since he is significantly confused and his wife does not feel comfortable with him going home like this, he will be admitted to the hospital under the care of Dr. Tobin.     Covid-19  Ron Gilmore was evaluated during a global COVID-19 pandemic, which necessitated consideration that the patient might be at risk for infection with the SARS-CoV-2 virus that causes COVID-19.   Applicable protocols for evaluation were followed during the patient's care.   COVID-19 was considered as part of the patient's evaluation. The plan for testing is:  a test was obtained during this visit.      Diagnosis:     ICD-10-CM    1. Urinary tract infection with hematuria, site unspecified  N39.0     R31.9    2. Metabolic encephalopathy  G93.41         Scribe Disclosure:  I, Kim Levi, am serving as a scribe at 3:30 PM on 2/8/2021 to document services personally performed by Augustin Helm MD based on my observations and the provider's statements to me.      Augustin Helm MD  02/08/21 2420

## 2021-02-08 NOTE — ED TRIAGE NOTES
Increased confusion last few days. Cardona catheter was replaced 2 weeks ago. On 2 L home oxygen.

## 2021-02-09 ENCOUNTER — APPOINTMENT (OUTPATIENT)
Dept: SPEECH THERAPY | Facility: CLINIC | Age: 81
DRG: 698 | End: 2021-02-09
Attending: HOSPITALIST
Payer: COMMERCIAL

## 2021-02-09 LAB
ALBUMIN SERPL-MCNC: 2 G/DL (ref 3.4–5)
ALP SERPL-CCNC: 55 U/L (ref 40–150)
ALT SERPL W P-5'-P-CCNC: 13 U/L (ref 0–70)
ANION GAP SERPL CALCULATED.3IONS-SCNC: 2 MMOL/L (ref 3–14)
AST SERPL W P-5'-P-CCNC: 24 U/L (ref 0–45)
BASOPHILS # BLD AUTO: 0 10E9/L (ref 0–0.2)
BASOPHILS NFR BLD AUTO: 0.2 %
BILIRUB SERPL-MCNC: 0.6 MG/DL (ref 0.2–1.3)
BUN SERPL-MCNC: 30 MG/DL (ref 7–30)
CALCIUM SERPL-MCNC: 8.7 MG/DL (ref 8.5–10.1)
CHLORIDE SERPL-SCNC: 109 MMOL/L (ref 94–109)
CO2 SERPL-SCNC: 30 MMOL/L (ref 20–32)
CREAT SERPL-MCNC: 1.02 MG/DL (ref 0.66–1.25)
DIFFERENTIAL METHOD BLD: ABNORMAL
EOSINOPHIL # BLD AUTO: 0 10E9/L (ref 0–0.7)
EOSINOPHIL NFR BLD AUTO: 0 %
ERYTHROCYTE [DISTWIDTH] IN BLOOD BY AUTOMATED COUNT: 14.6 % (ref 10–15)
GFR SERPL CREATININE-BSD FRML MDRD: 70 ML/MIN/{1.73_M2}
GLUCOSE BLDC GLUCOMTR-MCNC: 144 MG/DL (ref 70–99)
GLUCOSE BLDC GLUCOMTR-MCNC: 76 MG/DL (ref 70–99)
GLUCOSE BLDC GLUCOMTR-MCNC: 91 MG/DL (ref 70–99)
GLUCOSE BLDC GLUCOMTR-MCNC: 98 MG/DL (ref 70–99)
GLUCOSE SERPL-MCNC: 90 MG/DL (ref 70–99)
HCT VFR BLD AUTO: 46.5 % (ref 40–53)
HGB BLD-MCNC: 13.7 G/DL (ref 13.3–17.7)
IMM GRANULOCYTES # BLD: 0 10E9/L (ref 0–0.4)
IMM GRANULOCYTES NFR BLD: 0.3 %
LACTATE BLD-SCNC: 1.2 MMOL/L (ref 0.7–2)
LYMPHOCYTES # BLD AUTO: 1.1 10E9/L (ref 0.8–5.3)
LYMPHOCYTES NFR BLD AUTO: 9.3 %
MCH RBC QN AUTO: 28 PG (ref 26.5–33)
MCHC RBC AUTO-ENTMCNC: 29.5 G/DL (ref 31.5–36.5)
MCV RBC AUTO: 95 FL (ref 78–100)
MONOCYTES # BLD AUTO: 1.5 10E9/L (ref 0–1.3)
MONOCYTES NFR BLD AUTO: 12.9 %
NEUTROPHILS # BLD AUTO: 8.9 10E9/L (ref 1.6–8.3)
NEUTROPHILS NFR BLD AUTO: 77.3 %
NRBC # BLD AUTO: 0 10*3/UL
NRBC BLD AUTO-RTO: 0 /100
PLATELET # BLD AUTO: 177 10E9/L (ref 150–450)
POTASSIUM SERPL-SCNC: 4.9 MMOL/L (ref 3.4–5.3)
PROT SERPL-MCNC: 7 G/DL (ref 6.8–8.8)
RBC # BLD AUTO: 4.9 10E12/L (ref 4.4–5.9)
SODIUM SERPL-SCNC: 141 MMOL/L (ref 133–144)
WBC # BLD AUTO: 11.5 10E9/L (ref 4–11)

## 2021-02-09 PROCEDURE — 999N001017 HC STATISTIC GLUCOSE BY METER IP

## 2021-02-09 PROCEDURE — 92610 EVALUATE SWALLOWING FUNCTION: CPT | Mod: GN | Performed by: SPEECH-LANGUAGE PATHOLOGIST

## 2021-02-09 PROCEDURE — G0463 HOSPITAL OUTPT CLINIC VISIT: HCPCS

## 2021-02-09 PROCEDURE — 250N000013 HC RX MED GY IP 250 OP 250 PS 637: Performed by: HOSPITALIST

## 2021-02-09 PROCEDURE — 80053 COMPREHEN METABOLIC PANEL: CPT | Performed by: HOSPITALIST

## 2021-02-09 PROCEDURE — 92526 ORAL FUNCTION THERAPY: CPT | Mod: GN | Performed by: SPEECH-LANGUAGE PATHOLOGIST

## 2021-02-09 PROCEDURE — 99232 SBSQ HOSP IP/OBS MODERATE 35: CPT | Performed by: HOSPITALIST

## 2021-02-09 PROCEDURE — 83605 ASSAY OF LACTIC ACID: CPT | Performed by: HOSPITALIST

## 2021-02-09 PROCEDURE — 250N000011 HC RX IP 250 OP 636: Performed by: HOSPITALIST

## 2021-02-09 PROCEDURE — 36415 COLL VENOUS BLD VENIPUNCTURE: CPT | Performed by: HOSPITALIST

## 2021-02-09 PROCEDURE — 120N000001 HC R&B MED SURG/OB

## 2021-02-09 PROCEDURE — 85025 COMPLETE CBC W/AUTO DIFF WBC: CPT | Performed by: HOSPITALIST

## 2021-02-09 RX ADMIN — ATORVASTATIN CALCIUM 10 MG: 10 TABLET, FILM COATED ORAL at 09:21

## 2021-02-09 RX ADMIN — METOPROLOL TARTRATE 25 MG: 25 TABLET, FILM COATED ORAL at 21:44

## 2021-02-09 RX ADMIN — METOPROLOL TARTRATE 25 MG: 25 TABLET, FILM COATED ORAL at 09:00

## 2021-02-09 RX ADMIN — PIPERACILLIN SODIUM AND TAZOBACTAM SODIUM 4.5 G: 4; .5 INJECTION, POWDER, LYOPHILIZED, FOR SOLUTION INTRAVENOUS at 02:02

## 2021-02-09 RX ADMIN — PAROXETINE HYDROCHLORIDE 20 MG: 20 TABLET, FILM COATED ORAL at 09:21

## 2021-02-09 RX ADMIN — PIPERACILLIN SODIUM AND TAZOBACTAM SODIUM 4.5 G: 4; .5 INJECTION, POWDER, LYOPHILIZED, FOR SOLUTION INTRAVENOUS at 09:18

## 2021-02-09 RX ADMIN — ASPIRIN 325 MG: 325 TABLET, COATED ORAL at 09:21

## 2021-02-09 RX ADMIN — ALLOPURINOL 300 MG: 300 TABLET ORAL at 09:21

## 2021-02-09 RX ADMIN — PIPERACILLIN SODIUM AND TAZOBACTAM SODIUM 4.5 G: 4; .5 INJECTION, POWDER, LYOPHILIZED, FOR SOLUTION INTRAVENOUS at 17:56

## 2021-02-09 ASSESSMENT — ACTIVITIES OF DAILY LIVING (ADL)
ADLS_ACUITY_SCORE: 24
ADLS_ACUITY_SCORE: 22
ADLS_ACUITY_SCORE: 24
ADLS_ACUITY_SCORE: 24
ADLS_ACUITY_SCORE: 22
ADLS_ACUITY_SCORE: 24

## 2021-02-09 NOTE — PLAN OF CARE
Alert to self, very lethargic. VSS on 4L nc. Denies pain and N/V. NPO. Cardona with cloudy/mucus OP. Nonblanchable redness and open fissure to coccyx, mepilex applied. No BM this shift. Skin clammy/diaphoretic. R PIV infusing NS @ 75ml/hr, int abx. Speech consult today.

## 2021-02-09 NOTE — PROGRESS NOTES
Pt was ordered on cpap at night. Pt did not bring home machine,brought up hospital machine. According to prior notes pt is non compliant with wearing machine at home. RN aware and will call if pt is having issues.

## 2021-02-09 NOTE — PROGRESS NOTES
[facility]  Patient is currently open to home care services with [facility]. The patient is currently receiving RN services.  and home health team have been notified of patient admission. [facility] liaison will continue to follow patient during stay. If appropriate provide orders to resume home care at time of discharge.

## 2021-02-09 NOTE — PHARMACY-ADMISSION MEDICATION HISTORY
Pharmacy Medication History  Admission medication history interview status for the 2/8/2021  admission is complete. See EPIC admission navigator for prior to admission medications     Location of Interview: Phone  Medication history sources: Patient's family/friend (wife Yuli)    Significant changes made to the medication list:  -Removed tamsulosin as no longer taking.  -Changed paroxetine from 30mg to 20mg daily.    Additional medication history information:   -He took Imodium AD once couple days ago.    Medication reconciliation completed by provider prior to medication history? No    Time spent in this activity: 15 min    Prior to Admission medications    Medication Sig Last Dose Taking? Auth Provider   acetaminophen (TYLENOL) 325 MG tablet Take 650 mg by mouth every 4 hours as needed for mild pain as needed for pain/fever Max dose 3000mg/24hr prn Yes Reported, Patient   allopurinol (ZYLOPRIM) 300 MG tablet Take 300 mg by mouth daily 2/7/2021 at am Yes Unknown, Entered By History   aspirin (ASA) 325 MG EC tablet Take 1 tablet (325 mg) by mouth daily 2/7/2021 at am Yes Sandro Maradiaga MD   atorvastatin (LIPITOR) 10 MG tablet Take 1 tablet (10 mg) by mouth every day 2/7/2021 Yes Sandro Maradiaga MD   Emollient (AMLACTIN ULTRA EX) Externally apply topically daily as needed APPLY TO FEET  Past Month at Unknown time Yes Reported, Patient   ipratropium - albuterol 0.5 mg/2.5 mg/3 mL 0.5-2.5 (3) MG/3ML IN neb solution Take 1 vial by nebulization every 4 hours as needed  Past Month at Unknown time Yes Reported, Patient   metoprolol tartrate (LOPRESSOR) 25 MG tablet Take 1 tablet (25 mg) by mouth 2 times daily 2/7/2021 Yes Iris Mcmanus MD   PARoxetine (PAXIL) 20 MG tablet Take 20 mg by mouth daily 2/7/2021 Yes Unknown, Entered By History       The information provided in this note is only as accurate as the sources available at the time of update(s)

## 2021-02-09 NOTE — PROGRESS NOTES
02/09/21 0945   General Information   Onset of Illness/Injury or Date of Surgery 02/08/21   Referring Physician Dr. Tobin   Patient/Family Therapy Goal Statement (SLP) Patient is hungry.   Pertinent History of Current Problem Ron Gilmore is a 80 year old male admitted on 2/8/2021.  Past history of BPH with urinary retention and chronic chirinos, oxygen-dependent COPD, BRAD, DM II, HLD, HTN, CVA with left sided weakness who presents with confusion and fever, found to have probable CAUTI.   General Observations Pleasant and cooperative.   Past History of Dysphagia Seen in 2019 follow stroke and discharged on a DDL 1 with honey thick liquids. Video in 2020 he had aspiration of thin liquids and penetration of nectar thick liquids.    Type of Evaluation   Type of Evaluation Swallow Evaluation   Oral Motor   Oral Musculature anomalies present   Structural Abnormalities none present   Mucosal Quality dry;sticky   Dentition (Oral Motor)   Dentition (Oral Motor) dental appliance/dentures   Comment, Dentition (Oral Motor) Lower not present.    Dental Appliance/Denture (Oral Motor) upper and lower   Facial Symmetry (Oral Motor)   Facial Symmetry (Oral Motor) right side impairment   Right Side Facial Asymmetry minimal impairment   Lip Function (Oral Motor)   Lip Range of Motion (Oral Motor) protrusion impairment;retraction impairment   Protrusion, Lip Range of Motion minimal impairment;right side   Retraction, Lip Range of Motion minimal impairment;right side   Lip Strength (Oral Motor) WFL   Tongue Function (Oral Motor)   Tongue ROM (Oral Motor) protrusion is impaired;retraction is impaired;lateralization is impaired   Protrusion, Tongue ROM Impairment (Oral Motor) moderate impairment;left side   Retraction, Tongue ROM Impairment (Oral Motor) minimal impairment;left side   Lateralization, Tongue ROM Impairment (Oral Motor) minimal impairment;left side   Tongue Strength (Oral Motor) minimal impairment   Tongue  Coordination/Speed (Oral Motor) reduced rate   Jaw Function (Oral Motor)   Jaw Function (Oral Motor) WNL   Cough/Swallow/Gag Reflex (Oral Motor)   Soft Palate/Velum (Oral Motor) WNL   Volitional Throat Clear/Cough (Oral Motor) impaired;reduced strength   Volitional Swallow (Oral Motor) mildly delayed   Vocal Quality/Secretion Management (Oral Motor)   Vocal Quality (Oral Motor) WFL   Secretion Management (Oral Motor) awareness of wet vocal quality   General Swallowing Observations   Current Diet/Method of Nutritional Intake (General Swallowing Observations, NIS) NPO   Respiratory Support (General Swallowing Observations) none   Clinical Swallow Evaluation   Feeding Assistance frequent cues/help required   Clinical Swallow Evaluation Textures Trialed Nectar-Thick Liquids;Honey-Thick Liquids;Puree Textures;Solid Foods   Clinical Swallow Evaluation: Nectar-Thick Liquid Texture Trial   Mode of Presentation, Nectar cup;spoon;fed by clinician;self-fed   Volume of Nectar Presented 2 oz of water   Oral Phase, Nectar Premature pharyngeal entry   Pharyngeal Phase, Nectar impaired;coughing/choking;repeated swallows;wet vocal quality after swallow   Diagnostic Statement Delayed wet vocal quality and cough.    Clinical Swallow Evaluation: Honey-Thick Liquid Texture Trial   Mode of Presentation, Honey cup;spoon;self-fed;fed by clinician   Volume of Honey Presented 2 oz of juice   Oral Phase, Honey Premature pharyngeal entry   Pharyngeal Phase, Honey impaired;coughing/choking;reduction in laryngeal movement   Diagnostic Statement Delayed cough not sure if directly related due to baseline cough.   Clinical Swallow Evaluation: Puree Solid Texture Trial   Mode of Presentation, Puree spoon;fed by clinician   Volume of Puree Presented 2 oz of apple sauce   Oral Phase, Puree Poor AP movement;Premature pharyngeal entry   Pharyngeal Phase, Puree impaired;reduction in laryngeal movement;repeated swallows   Clinical Swallow Evaluation:  Solid Food Texture Trial   Mode of Presentation, Solid spoon;fed by clinician   Volume of Solid Food Presented 4 swallows   Oral Phase, Solid Poor AP movement;Residue in oral cavity;Premature pharyngeal entry   Oral Residue, Solid left anterior lateral sulci;soft palate;mid posterior tongue   Pharyngeal Phase, Solid impaired;coughing/choking;repeated swallows;wet vocal quality after swallow   Diagnostic Statement Delayed cough with slight vocal changes and minimal to mild oral residue.    Swallowing Recommendations   Diet Consistency Recommendations dysphagia level 2 (mechanically altered);honey-thick liquids   Supervision Level for Intake 1:1 supervision needed   Mode of Delivery Recommendations bolus size, small;food moistened;no straws   Postural Recommendations none   Swallowing Maneuver Recommendations alternate food and liquid intake;effortful (hard) swallow   Monitoring/Assistance Required (Eating/Swallowing) check mouth frequently for oral residue/pocketing;stop eating activities when fatigue is present;monitor for cough or change in vocal quality with intake   Recommended Feeding/Eating Techniques (Swallow Eval) maintain upright sitting position for eating;maintain upright posture during/after eating for 30 minutes;minimize distractions during oral intake;set-up and prepare tray;provide assist with feeding   Medication Administration Recommendations, Swallowing (SLP) Whole with thickened liquids.    General Therapy Interventions   Planned Therapy Interventions Dysphagia Treatment   Dysphagia treatment Modified diet education;Instruction of safe swallow strategies   SLP Therapy Assessment/Plan   Criteria for Skilled Therapeutic Interventions Met (SLP Eval) yes   SLP Diagnosis Mild to moderate oral and pharyngeal dysphagia   Rehab Potential (SLP Eval) good, to achieve stated therapy goals   Therapy Frequency (SLP Eval) 5 times/wk   Predicted Duration of Therapy Intervention (SLP Eval) 1 week   Comment,  Therapy Assessment/Plan (SLP) Patient presents with mild to moderate oral and pharyngeal dysphagia secondary to an old stroke in the setting of an infection. Patient with mild lingual impairment, baseline cough and congestion. He demonstrated premature entry of nectar thick liquids with a delayed cough response after trials via the cup and drop in O2 levels in the low 80's. Improved tolerance of honey thick liquids via the cup, but did have a delayed cough x2. Tolerated pureed textures without difficulty. Missing his lower dentures but was able to suffciently masticate soft solids with mild oral residue after the swallow that was cleared with a liquid rinse. Patient is consdered a risk for aspiration and shoul be monitored closely with PO intake.    Therapy Plan Review/Discharge Plan (SLP)   Therapy Plan Review (SLP) evaluation/treatment results reviewed;care plan/treatment goals reviewed;risks/benefits reviewed;current/potential barriers reviewed;participants voiced agreement with care plan;participants included;patient   SLP Discharge Planning    SLP Discharge Recommendation (DC Rec) home with home care speech therapy   SLP Rationale for DC Rec Patient may need continued ST needs pending progress.    SLP Brief overview of current status  Patient presents with mild to moderate dysphagia. recommend: DDL 2 with honey thick liquids. Upright, small bites/sips, alternate liquids/solids and check for oral residue.     Total Evaluation Time   Total Evaluation Time (Minutes) 20

## 2021-02-09 NOTE — PLAN OF CARE
Arrived from ED @19:30. A&Ox2/3, lethargic. VS tachy, HTN, 4L NC, 100.6- tylenol given. Ax2, T/R Q 2 hrs. Inspiratory and exp wheezing noted. Non-blanchable redness to coccyx. NPO. SLP consult, had a hard time swallowing meds with thin liquids. Per pt, thickens liquids d/t dysphagia. CT of head, see results. New IV placed- NS @ 75 with int abx. Skin clammy/diaphoretic, reinforced IV dressing. Denies pain, N/V. UC and BC pending. Uses CPAP at night, see RT note. Cardona in place, will need to be exchanged in AM. Continue to monitor.

## 2021-02-09 NOTE — PROGRESS NOTES
RECEIVING UNIT ED HANDOFF REVIEW    ED Nurse Handoff Report was reviewed by: Yoli Stoner on February 8, 2021 at 6:38 PM

## 2021-02-09 NOTE — DISCHARGE INSTRUCTIONS
generalized skin care  plan of care: effective now   Position pt side to side only when in bed, positing as far to the side as you can.  Turning way on his side is good for his skin but ALSO for the lungs!  It will mobilize secretions in the lungs and get the lungs working better, get turning!    - at home, when there is help, position pt on his side then a few hours later you can resume what positioning you can do    - keep heels elevated at all times- this means at least one pillow under each calf (length wise along the leg), checking to make sure the heels are floating. Sometimes you may need a couple of pillows because pillows are more flat than others.  Hygiene-   -clean to the base of all skin folds, dry thoroughly, dust with baby powder  - goal is to keep skin clean and dry. The baby powder acts as a desiccant and will help dry out the skin, especially in those folds and on the backside  - if you keep him turned to his side the back/ butt has a chance to dry out  - apply lotion to arms and legs    Pt's wife has done a really wonderful job keeping skin healthy!

## 2021-02-09 NOTE — PROGRESS NOTES
[facility]    Medicine Progress Note - Hospitalist Service       Date of Admission:  2/8/2021  Assessment & Plan       Ron Gilmore is a 80 year old male admitted on 2/8/2021.  Past history of BPH with urinary retention and chronic chirinos, oxygen-dependent COPD, BRAD, DM II, HLD, HTN, CVA with left sided weakness who presents with confusion and fever, found to have probable CAUTI.      Sepsis most likely secondary to catheter associated urinary tract infection  Acute metabolic encephalopathy  * Patient acutely confused with nonfocal exam outside of his usual left hemiplegia after his stroke  * Most likely is septic from a urinary source with fever of 100.1, and leukocytosis of 12.4.  He appeared systemically toxic and ill at admission.  * Chest x-ray is clear, LFT's and VBG unremarkable, COVID-19 negative  * UA from a catheterized specimen in the setting of chronic Chirinos was grossly abnormal  * Head CT with low attenuation change of posterior right centrum semiovale which corresponds to CVA from 1/2020; no acute pathology    - lactate has normalized, fever curve and leukocytosis improved overnight  - continue zosyn pending urine culture  - urine and blood cultures ngtd  - will need catheter exchanged today  - currently oriented x3 and to situation     Chronic hypoxic respiratory failure due to COPD/BRAD   Reportedly non-compliant with CPAP at home.  Reports he has oxygen at home, but only uses it as needed and not continuously.  Admission CXR clear.  - prn Duonebs  - down to 2L oxygen this AM, wean as able    Coccygeal pressure injury  Present at admission.   - WOC consult    CVA with left hemiparesis  Does not ambulate at baseline.   - SLP consult for swallow eval  - continue PTA aspirin    HLD  - continue PTA atorvastatin     HTN  - continue PTA metoprolol    DM II  A1C 6.0% this admission. Diet controlled.   - medium ssi     Depression  - continue PTA Paxil    Gout  - continue PTA  allopurinol       Diet: NPO for Medical/Clinical Reasons Except for: Meds, Ice Chips    DVT Prophylaxis: Pneumatic Compression Devices  Cardona Catheter: in place, indication: Retention  Code Status: Full Code           Disposition Plan   Expected discharge: 1-2 days, recommended to prior living arrangement once antibiotic plan established and SIRS/Sepsis treated.  Entered: Steve Hernandez MD 02/09/2021, 8:48 AM       The patient's care was discussed with the Bedside Nurse and Patient.    Steve Hernandez MD  Hospitalist Service  [facility]  Contact information available via [facility] Paging/Directory    ______________________________________________________________________    Interval History   Reports feeling generally improved with resolution of fever overnight.  Denies chills, sweats, confusion.  Reports recent diarrhea but no abdominal pain or nausea.  Has had chronic cough since summer but this is unchanged and denies any dyspnea.      Data reviewed today: I reviewed all medications, new labs and imaging results over the last 24 hours. I personally reviewed no images or EKG's today.    Physical Exam   Vital Signs: Temp: 98.2  F (36.8  C) Temp src: Oral BP: (!) 172/87 Pulse: 84   Resp: 18 SpO2: 94 % O2 Device: Nasal cannula Oxygen Delivery: 4 LPM  Weight: 270 lbs 0 oz  General Appearance: Well nourished male in NAD  Respiratory: lungs CTAB, no wheezes or crackles, no tachypnea  Cardiovascular: RRR, normal s1/s2 without murmur  GI: abdomen soft, nontender, normal bowel sounds  Skin: coccyx not examined as poor bed mobility  Other: Alert and oriented x3 and to situation, cranial nerves grossly intact, mumbled speech but reports this is chronic     Data   Recent Labs   Lab 02/09/21  0742 02/08/21  1523   WBC 11.5* 12.6*   HGB 13.7 14.1   MCV 95 93    207    138   POTASSIUM 4.9 3.9   CHLORIDE 109 105   CO2 30 29   BUN 30 29   CR 1.02 1.00   ANIONGAP 2* 4   RAJ 8.7 8.9   GLC 90 146*    ALBUMIN 2.0* 2.3*   PROTTOTAL 7.0 7.7   BILITOTAL 0.6 0.8   ALKPHOS 55 62   ALT 13 12   AST 24 9

## 2021-02-10 VITALS
BODY MASS INDEX: 36.57 KG/M2 | HEIGHT: 72 IN | TEMPERATURE: 95.6 F | OXYGEN SATURATION: 92 % | HEART RATE: 82 BPM | DIASTOLIC BLOOD PRESSURE: 80 MMHG | WEIGHT: 270 LBS | SYSTOLIC BLOOD PRESSURE: 161 MMHG | RESPIRATION RATE: 16 BRPM

## 2021-02-10 LAB
BACTERIA SPEC CULT: ABNORMAL
BACTERIA SPEC CULT: ABNORMAL
ERYTHROCYTE [DISTWIDTH] IN BLOOD BY AUTOMATED COUNT: 14.6 % (ref 10–15)
GLUCOSE BLDC GLUCOMTR-MCNC: 103 MG/DL (ref 70–99)
GLUCOSE BLDC GLUCOMTR-MCNC: 105 MG/DL (ref 70–99)
GLUCOSE BLDC GLUCOMTR-MCNC: 158 MG/DL (ref 70–99)
HCT VFR BLD AUTO: 43.5 % (ref 40–53)
HGB BLD-MCNC: 13.2 G/DL (ref 13.3–17.7)
Lab: ABNORMAL
MCH RBC QN AUTO: 28.5 PG (ref 26.5–33)
MCHC RBC AUTO-ENTMCNC: 30.3 G/DL (ref 31.5–36.5)
MCV RBC AUTO: 94 FL (ref 78–100)
PLATELET # BLD AUTO: 174 10E9/L (ref 150–450)
RBC # BLD AUTO: 4.63 10E12/L (ref 4.4–5.9)
SPECIMEN SOURCE: ABNORMAL
WBC # BLD AUTO: 8.6 10E9/L (ref 4–11)

## 2021-02-10 PROCEDURE — 85027 COMPLETE CBC AUTOMATED: CPT | Performed by: HOSPITALIST

## 2021-02-10 PROCEDURE — 258N000003 HC RX IP 258 OP 636: Performed by: HOSPITALIST

## 2021-02-10 PROCEDURE — 250N000013 HC RX MED GY IP 250 OP 250 PS 637: Performed by: HOSPITALIST

## 2021-02-10 PROCEDURE — 250N000011 HC RX IP 250 OP 636: Performed by: HOSPITALIST

## 2021-02-10 PROCEDURE — 999N001017 HC STATISTIC GLUCOSE BY METER IP

## 2021-02-10 PROCEDURE — 36415 COLL VENOUS BLD VENIPUNCTURE: CPT | Performed by: HOSPITALIST

## 2021-02-10 PROCEDURE — 99239 HOSP IP/OBS DSCHRG MGMT >30: CPT | Performed by: HOSPITALIST

## 2021-02-10 RX ADMIN — ALLOPURINOL 300 MG: 300 TABLET ORAL at 10:10

## 2021-02-10 RX ADMIN — ATORVASTATIN CALCIUM 10 MG: 10 TABLET, FILM COATED ORAL at 10:11

## 2021-02-10 RX ADMIN — ACETAMINOPHEN 650 MG: 325 TABLET, FILM COATED ORAL at 10:11

## 2021-02-10 RX ADMIN — METOPROLOL TARTRATE 25 MG: 25 TABLET, FILM COATED ORAL at 10:11

## 2021-02-10 RX ADMIN — PIPERACILLIN SODIUM AND TAZOBACTAM SODIUM 4.5 G: 4; .5 INJECTION, POWDER, LYOPHILIZED, FOR SOLUTION INTRAVENOUS at 00:01

## 2021-02-10 RX ADMIN — SODIUM CHLORIDE: 9 INJECTION, SOLUTION INTRAVENOUS at 00:46

## 2021-02-10 RX ADMIN — PIPERACILLIN SODIUM AND TAZOBACTAM SODIUM 4.5 G: 4; .5 INJECTION, POWDER, LYOPHILIZED, FOR SOLUTION INTRAVENOUS at 06:02

## 2021-02-10 RX ADMIN — PAROXETINE HYDROCHLORIDE 20 MG: 20 TABLET, FILM COATED ORAL at 10:10

## 2021-02-10 RX ADMIN — AMOXICILLIN AND CLAVULANATE POTASSIUM 1 TABLET: 875; 125 TABLET, FILM COATED ORAL at 10:10

## 2021-02-10 RX ADMIN — ASPIRIN 325 MG: 325 TABLET, COATED ORAL at 10:11

## 2021-02-10 ASSESSMENT — ACTIVITIES OF DAILY LIVING (ADL)
ADLS_ACUITY_SCORE: 23
ADLS_ACUITY_SCORE: 25
ADLS_ACUITY_SCORE: 23
ADLS_ACUITY_SCORE: 23

## 2021-02-10 NOTE — CONSULTS
Care Management Initial Consult    General Information  Assessment completed with: Care Team Member, Children, VM-chart review, Jacob Farrell       Primary Care Provider verified and updated as needed: Yes   Readmission within the last 30 days:        Reason for Consult: discharge planning  Advance Care Planning:            Communication Assessment  Patient's communication style: spoken language (English or Bilingual)    Hearing Difficulty or Deaf: no   Wear Glasses or Blind: no    Cognitive  Cognitive/Neuro/Behavioral: WDL  Level of Consciousness: alert  Arousal Level: opens eyes spontaneously  Orientation: disoriented to, time, situation, place  Mood/Behavior: calm, cooperative  Best Language: 0 - No aphasia  Speech: slow, garbled    Living Environment:   People in home: child(cyrus), adult, spouse     Current living Arrangements:        Able to return to prior arrangements:         Family/Social Support:  Care provided by: homecare agency, other (see comments)(PCA's)  Provides care for:    Marital Status:              Description of Support System:           Current Resources:   Patient receiving home care services: Yes  Skilled Home Care Services: Skilled Nursing  Community Resources: PCA  Equipment currently used at home: transfer board  Supplies currently used at home:      Employment/Financial:  Employment Status: retired        Financial Concerns: No concerns identified           Lifestyle & Psychosocial Needs:        Socioeconomic History     Marital status:      Spouse name: Not on file     Number of children: Not on file     Years of education: Not on file     Highest education level: Not on file     Tobacco Use     Smoking status: Former Smoker     Smokeless tobacco: Never Used   Substance and Sexual Activity     Alcohol use: No     Comment: none for 29 yrs     Drug use: No       Functional Status:  Prior to admission patient needed assistance:              Mental Health Status:          Chemical  Dependency Status:                Values/Beliefs:  Spiritual, Cultural Beliefs, Holiness Practices, Values that affect care: no               Additional Information:  Talked with Dtr Jami ( [phone]) regarding discharge plans.  Pt open to HC RN. WIll add on SLP.  Pt/family in agreement. Email sent to [facility] to confirm discharge today.  Information added to AVS.  Reviewed with Jami that meds will be filled here.   Care Management Discharge Note    Discharge Date: 02/11/21       Discharge Disposition: Home, Home Care    Discharge Services: Transportation Services    Discharge DME: None    Discharge Transportation: family or friend will provide    Private pay costs discussed: transportation costs with Dtr Jami    PAS Confirmation Code:    Patient/family educated on Medicare website which has current facility and service quality ratings: (NA)    Education Provided on the Discharge Plan:    Persons Notified of Discharge Plans: Jami/Pt/Nurse  Patient/Family in Agreement with the Plan: yes    Handoff Referral Completed: No    Additional Information:  Pt needs a ride arranged at discharge.  health w/c ride arranged for 2pm.  No oxygen needed at discharge. Family updated on time and aware of potential costs.   Dtr will set up follow up appointment with PCP.   No other discharge needs.        Flora Maurice, RN

## 2021-02-10 NOTE — PLAN OF CARE
Speech Language Therapy Discharge Summary    Reason for therapy discharge:    Discharged to home with home therapy.    Progress towards therapy goal(s). See goals on Care Plan in [facility] electronic health record for goal details.  Goals not met.  Barriers to achieving goals:   discharge from facility.    Therapy recommendation(s):    Continued therapy is recommended.  Rationale/Recommendations:  Continue ST needs to return to previous diet. Discharged on a DDL 2 with honey thick liquids.

## 2021-02-10 NOTE — PLAN OF CARE
VSS ex HTN. A&O. Slept comfortably throughout the night. IVF and ABX. Cardona w/ good UOP. Declined CPAP. On pulsate mattress, up w/ lift. Discharge pending improvement.

## 2021-02-10 NOTE — DISCHARGE SUMMARY
[facility]  Hospitalist Discharge Summary      Date of Admission:  2/8/2021  Date of Discharge:  2/10/2021  Discharging Provider: Steve Hernandez MD      Discharge Diagnoses   Sepsis likely due to CAUTI  Acute metabolic encephalopathy  Chronic hypoxic respiratory failure due to COPD/BRAD  Hx CVA with left hemiparesis and dysphagia  HLD  HTN  DM II  Depression  Gout    Follow-ups Needed After Discharge   Follow-up Appointments     Follow-up and recommended labs and tests       Follow up with primary care provider, Augustin Thomas, within 7 days   for hospital follow- up.  No follow up labs or test are needed.             Unresulted Labs Ordered in the Past 30 Days of this Admission     Date and Time Order Name Status Description    2/8/2021 1529 Blood culture Preliminary     2/8/2021 1529 Blood culture Preliminary       These results will be followed up by: Hospitalist service    Discharge Disposition   Discharged to home  Condition at discharge: Stable    Hospital Course         Ron Gilmore is a 80 year old male admitted on 2/8/2021.  Past history of BPH with urinary retention and chronic chirinos, oxygen-dependent COPD, BRAD, DM II, HLD, HTN, CVA with left sided weakness who presents with confusion and fever, found to have probable CAUTI.      Sepsis most likely secondary to catheter associated urinary tract infection  Acute metabolic encephalopathy due to above  Patient acutely confused with nonfocal exam outside of his usual left hemiplegia after his stroke.  Suspect due to CAUTI with fever of 100.1, and leukocytosis of 12.4, abnormal UA (though in setting of chronic indwelling chirinos).  He appeared systemically toxic and ill at admission.  Other work-up including head CT, CXR, LFT's and VBG unremarkable, COVID-19 negative.  Urine culture growing >100K Proteus mirabilis resistant to nitrofurantoin.  Sepsis resolved and mental status cleared with zosyn, will transition to Augmentin for  additional 5 days to complete 1-week antibiotic course (chirinos catheter exchanged in ED).     Chronic hypoxic respiratory failure due to COPD/BRAD   Reportedly non-compliant with CPAP at home.  Reports he has oxygen at home, but only uses it as needed and not continuously.  Stable on room air at discharge.     Coccygeal pressure injury, ruled out  Present at admission. WOC felt this represented congested tissue with no injury present.     CVA with left hemiparesis and dysphagia  Does not ambulate at baseline.  SLP recommended home SLP.  Continue PTA aspirin.    All other medical issues remain stable at baseline.       Consultations This Hospital Stay   SPEECH LANGUAGE PATH ADULT IP CONSULT  WOUND OSTOMY CONTINENCE NURSE  IP CONSULT    Code Status   Full Code    Time Spent on this Encounter   I, Steve Hernandez MD, personally saw the patient today and spent greater than 30 minutes discharging this patient.       Steve Hernandez MD  [place] [id number] [facility]  [address]  [facility] [address]  Phone: [phone]  ______________________________________________________________________    Physical Exam   Vital Signs: Temp: 95.6  F (35.3  C) Temp src: Axillary BP: (!) 161/80 Pulse: 82   Resp: 16 SpO2: 92 % O2 Device: None (Room air)    Weight: 270 lbs 0 oz  General Appearance: Overweight male in NAD  Respiratory: lungs CTAB, no wheezes or crackles no tachypnea  Cardiovascular: RRR, normal s1/s2 without murmur  GI: abdomen soft, normal bowel sounds, nontender  Skin: no peripheral edema  Other: Alert and oriented x3, cranial nerves grossly intact, left hemiparesis        Primary Care Physician   Augustin Thomas    Discharge Orders      Home care nursing referral      Home Care SLP Referral for Hospital Discharge      Reason for your hospital stay    You were admitted for confusion due to urinary tract infection.     Follow-up and recommended labs and tests     Follow up with primary care  provider, Augustin Thomas, within 7 days for hospital follow- up.  No follow up labs or test are needed.     Activity    Your activity upon discharge: activity as tolerated     Tubes and drains    You are going home with the following tubes or drains: chirinos catheter.  Tube cares per hospital or home care instructions     MD face to face encounter    Documentation of Face to Face and Certification for Home Health Services    I certify that patient: Ron Gilmore is under my care and that I, or a nurse practitioner or physician's assistant working with me, had a face-to-face encounter that meets the physician face-to-face encounter requirements with this patient on: 2/10/2021.    This encounter with the patient was in whole, or in part, for the following medical condition, which is the primary reason for home health care: CVA with left hemiparesis and dysphagia.    I certify that, based on my findings, the following services are medically necessary home health services: Nursing and Speech Language Therapy.    My clinical findings support the need for the above services because: Nurse is needed: For complex aftercare of surgical procedures because the patient needs instruction and cannot perform care on their own due to: left hemiparesis.. and Speech Therapy Services are needed to assess and treat impairments in language and/or swallow functions due to dysphagia.    Further, I certify that my clinical findings support that this patient is homebound (i.e. absences from home require considerable and taxing effort and are for medical reasons or Mu-ism services or infrequently or of short duration when for other reasons) because: Patient is bedbound due to: left hemiparesis..    Based on the above findings. I certify that this patient is confined to the home and needs intermittent skilled nursing care, physical therapy and/or speech therapy.  The patient is under my care, and I have initiated the establishment of  the plan of care.  This patient will be followed by a physician who will periodically review the plan of care.  Physician/Provider to provide follow up care: Augustin Thomas    Attending hospital physician (the Medicare certified [place] provider): Steve Hernandez MD  Physician Signature: See electronic signature associated with these discharge orders.  Date: 2/10/2021     Full Code     Oxygen Adult/Peds    Oxygen Documentation:   I certify that this patient, Ron Gilmore has been under my care (or a nurse practitioner or physican's assistant working with me). This is the face-to-face encounter for oxygen medical necessity.      Ron Gilmore is now in a chronic stable state and continues to require supplemental oxygen. Patient has continued oxygen desaturation due to COPD J44.9.    Alternative treatment(s) tried or considered and deemed clinically infective for treatment of COPD J44.9 include nebulizers and inhalers.  If portability is ordered, is the patient mobile within the home? No    **Patients who qualify for home O2 coverage under the CMS guidelines require ABG tests or O2 sat readings obtained closest to, but no earlier than 2 days prior to the discharge, as evidence of the need for home oxygen therapy. Testing must be performed while patient is in the chronic stable state. See notes for O2 sats.**     Diet    Follow this diet upon discharge:       Combination Diet Dysphagia Diet Level 2: Mechan Altered; Honey Thickened Liquids (pre-thickened or use instant food thickener) (no straws)       Significant Results and Procedures   Most Recent 3 CBC's:  Recent Labs   Lab Test 02/10/21  0740 02/09/21  0742 02/08/21  1523   WBC 8.6 11.5* 12.6*   HGB 13.2* 13.7 14.1   MCV 94 95 93    177 207     Most Recent 3 BMP's:  Recent Labs   Lab Test 02/09/21  0742 02/08/21  1523 03/11/20  0738    138 139   POTASSIUM 4.9 3.9 4.4   CHLORIDE 109 105 105   CO2 30 29 33*   BUN 30 29 24   CR 1.02 1.00 0.60*    ANIONGAP 2* 4 1*   RAJ 8.7 8.9 8.7   GLC 90 146* 102*     Most Recent 6 Bacteria Isolates From Any Culture (See EPIC Reports for Culture Details):  Recent Labs   Lab Test 02/08/21  1554 02/08/21  1553 06/24/20  0830 03/07/20  0332 02/06/20  1940 01/21/20  1220   CULT No growth after 2 days >100,000 colonies/mL  Proteus mirabilis  *  <1000 colonies/mL  urogenital patty  Susceptibility testing not routinely done    No growth after 2 days >100,000 colonies/mL  Escherichia coli  *  >100,000 colonies/mL  Proteus mirabilis  * >100,000 colonies/mL  Proteus mirabilis  * >100,000 colonies/mL  Proteus mirabilis  *  10,000 to 50,000 colonies/mL  Enterococcus faecalis  * 50,000 to 100,000 colonies/mL  Candida tropicalis  Susceptibility testing not routinely done  *   ,   Results for orders placed or performed during the hospital encounter of 02/08/21   XR Chest Port 1 View    Narrative    CHEST ONE VIEW PORTABLE   2/8/2021 4:10 PM     HISTORY: Altered mental status and fever.    COMPARISON: 1/21/2020      Impression    IMPRESSION: Unremarkable single view of the chest.    VENANCIO MUÑOZ MD   CT Head w/o Contrast    Narrative    EXAM: CT HEAD W/O CONTRAST  LOCATION: [facility]  DATE/TIME: 2/8/2021 9:46 PM    INDICATION: Left hemiplegia  COMPARISON: CT head 01/14/2020 head MRI 01/15/2020.  TECHNIQUE: Routine CT Head without IV contrast. Multiplanar reformats. Dose reduction techniques were used.    FINDINGS:  INTRACRANIAL CONTENTS: Increasing low-attenuation change within the posterior right centrum semiovale white matter extending towards the posterior aspect of the right putamen and internal capsule. This corresponds to diffusion abnormality seen on the   previous MRI. No hemorrhagic transformation or significant associated mass effect. No large territorial infarct identified elsewhere. Mild to moderate presumed chronic small vessel ischemic changes. Suspected chronic lacunar infarcts of the bilateral    basal ganglia similar to the prior exam. Mild generalized volume loss. No hydrocephalus.     VISUALIZED ORBITS/SINUSES/MASTOIDS: Prior bilateral cataract surgery. Visualized portions of the orbits are otherwise unremarkable. No paranasal sinus mucosal disease. No middle ear or mastoid effusion.    BONES/SOFT TISSUES: No acute abnormality.      Impression    IMPRESSION:  1.  New focus of low-attenuation change within the posterior right centrum semiovale extending towards the posterior right putamen and internal capsule consistent with subacute infarct. This corresponds to diffusion abnormality seen on the previous MRI.  2.  No hemorrhagic transformation or associated mass effect.  3.  Generalized brain atrophy and multifocal chronic ischemic changes elsewhere similar to the previous exam.       Discharge Medications   Current Discharge Medication List      START taking these medications    Details   amoxicillin-clavulanate (AUGMENTIN) 875-125 MG tablet Take 1 tablet by mouth every 12 hours for 5 days  Qty: 10 tablet, Refills: 0    Associated Diagnoses: Urinary tract infection with hematuria, site unspecified         CONTINUE these medications which have NOT CHANGED    Details   acetaminophen (TYLENOL) 325 MG tablet Take 650 mg by mouth every 4 hours as needed for mild pain as needed for pain/fever Max dose 3000mg/24hr      allopurinol (ZYLOPRIM) 300 MG tablet Take 300 mg by mouth daily      aspirin (ASA) 325 MG EC tablet Take 1 tablet (325 mg) by mouth daily    Associated Diagnoses: Left arm weakness; Cerebrovascular accident (CVA), unspecified mechanism (H)      atorvastatin (LIPITOR) 10 MG tablet Take 10 mg by mouth daily      Emollient (AMLACTIN ULTRA EX) Externally apply topically daily as needed APPLY TO FEET       ipratropium - albuterol 0.5 mg/2.5 mg/3 mL 0.5-2.5 (3) MG/3ML IN neb solution Take 1 vial by nebulization every 4 hours as needed       metoprolol tartrate (LOPRESSOR) 25 MG tablet Take 1 tablet  (25 mg) by mouth 2 times daily    Associated Diagnoses: Cerebrovascular accident (CVA), unspecified mechanism (H)      PARoxetine (PAXIL) 20 MG tablet Take 20 mg by mouth daily           Allergies   No Known Allergies

## 2021-02-10 NOTE — PLAN OF CARE
VSS, no fevers today, a/o, endorses feeling much better. Bathed, good breakfast, changed into own clothes, AVS reviewed and signed and he was discharged by wheelchair to home.

## 2021-02-10 NOTE — PLAN OF CARE
"Shift Note: HTN, other VSS, no fever, mentation has varied. Was very A/O this AM, spend few hours in chair, fatigued and was in [place]. Pleasant, WOC assessed, BGM are good, no insulin today. LS coarge at times , clear with coughing. SLP cleared DD2 and honey thick. IVF and ABX. Cardona w-good UOP. Calls for needs, assist to order/eat. CPaP at night, no O2 at home unless he \"feel like I need it\".  "

## 2021-02-10 NOTE — PROGRESS NOTES
Pt has cpap order for overnight. Pt declined to use the machine. Machine left at the bedside. RT will continue to follow.

## 2021-02-14 LAB
BACTERIA SPEC CULT: NO GROWTH
BACTERIA SPEC CULT: NO GROWTH
SPECIMEN SOURCE: NORMAL
SPECIMEN SOURCE: NORMAL

## 2021-03-09 ENCOUNTER — APPOINTMENT (OUTPATIENT)
Dept: INTERVENTIONAL RADIOLOGY/VASCULAR | Facility: CLINIC | Age: 81
DRG: 871 | End: 2021-03-09
Attending: PHYSICIAN ASSISTANT
Payer: COMMERCIAL

## 2021-03-09 ENCOUNTER — HOSPITAL ENCOUNTER (INPATIENT)
Facility: CLINIC | Age: 81
LOS: 13 days | Discharge: HOME HEALTH | DRG: 871 | End: 2021-03-22
Attending: EMERGENCY MEDICINE | Admitting: INTERNAL MEDICINE
Payer: COMMERCIAL

## 2021-03-09 ENCOUNTER — APPOINTMENT (OUTPATIENT)
Dept: GENERAL RADIOLOGY | Facility: CLINIC | Age: 81
DRG: 871 | End: 2021-03-09
Attending: EMERGENCY MEDICINE
Payer: COMMERCIAL

## 2021-03-09 ENCOUNTER — APPOINTMENT (OUTPATIENT)
Dept: GENERAL RADIOLOGY | Facility: CLINIC | Age: 81
DRG: 871 | End: 2021-03-09
Payer: COMMERCIAL

## 2021-03-09 ENCOUNTER — APPOINTMENT (OUTPATIENT)
Dept: CT IMAGING | Facility: CLINIC | Age: 81
DRG: 871 | End: 2021-03-09
Attending: INTERNAL MEDICINE
Payer: COMMERCIAL

## 2021-03-09 ENCOUNTER — APPOINTMENT (OUTPATIENT)
Dept: SPEECH THERAPY | Facility: CLINIC | Age: 81
DRG: 871 | End: 2021-03-09
Attending: INTERNAL MEDICINE
Payer: COMMERCIAL

## 2021-03-09 DIAGNOSIS — K59.00 CONSTIPATION, UNSPECIFIED CONSTIPATION TYPE: Primary | ICD-10-CM

## 2021-03-09 DIAGNOSIS — N39.0 URINARY TRACT INFECTION ASSOCIATED WITH INDWELLING URETHRAL CATHETER, INITIAL ENCOUNTER (H): ICD-10-CM

## 2021-03-09 DIAGNOSIS — E87.20 LACTIC ACIDOSIS: ICD-10-CM

## 2021-03-09 DIAGNOSIS — R65.20 SEVERE SEPSIS (H): ICD-10-CM

## 2021-03-09 DIAGNOSIS — I63.9 CEREBROVASCULAR ACCIDENT (CVA), UNSPECIFIED MECHANISM (H): ICD-10-CM

## 2021-03-09 DIAGNOSIS — R13.10 DYSPHAGIA, UNSPECIFIED TYPE: ICD-10-CM

## 2021-03-09 DIAGNOSIS — A41.9 SEVERE SEPSIS (H): ICD-10-CM

## 2021-03-09 DIAGNOSIS — R50.9 FEBRILE ILLNESS: ICD-10-CM

## 2021-03-09 DIAGNOSIS — T83.511A URINARY TRACT INFECTION ASSOCIATED WITH INDWELLING URETHRAL CATHETER, INITIAL ENCOUNTER (H): ICD-10-CM

## 2021-03-09 PROBLEM — N13.1 HYDRONEPHROSIS DUE TO OBSTRUCTION OF URETER: Status: ACTIVE | Noted: 2021-03-09

## 2021-03-09 LAB
ALBUMIN SERPL-MCNC: 2.3 G/DL (ref 3.4–5)
ALBUMIN UR-MCNC: 30 MG/DL
ALP SERPL-CCNC: 68 U/L (ref 40–150)
ALT SERPL W P-5'-P-CCNC: 13 U/L (ref 0–70)
ANION GAP SERPL CALCULATED.3IONS-SCNC: 4 MMOL/L (ref 3–14)
ANION GAP SERPL CALCULATED.3IONS-SCNC: 8 MMOL/L (ref 3–14)
APPEARANCE UR: ABNORMAL
AST SERPL W P-5'-P-CCNC: 14 U/L (ref 0–45)
BACTERIA #/AREA URNS HPF: ABNORMAL /HPF
BASE DEFICIT BLDV-SCNC: 8.7 MMOL/L
BASE DEFICIT BLDV-SCNC: 9.4 MMOL/L
BASOPHILS # BLD AUTO: 0 10E9/L (ref 0–0.2)
BASOPHILS NFR BLD AUTO: 0.2 %
BILIRUB DIRECT SERPL-MCNC: 0.4 MG/DL (ref 0–0.2)
BILIRUB SERPL-MCNC: 1.1 MG/DL (ref 0.2–1.3)
BILIRUB UR QL STRIP: NEGATIVE
BUN SERPL-MCNC: 18 MG/DL (ref 7–30)
BUN SERPL-MCNC: 20 MG/DL (ref 7–30)
CALCIUM SERPL-MCNC: 7.9 MG/DL (ref 8.5–10.1)
CALCIUM SERPL-MCNC: 8.1 MG/DL (ref 8.5–10.1)
CHLORIDE SERPL-SCNC: 102 MMOL/L (ref 94–109)
CHLORIDE SERPL-SCNC: 113 MMOL/L (ref 94–109)
CO2 SERPL-SCNC: 23 MMOL/L (ref 20–32)
CO2 SERPL-SCNC: 30 MMOL/L (ref 20–32)
COLOR UR AUTO: ABNORMAL
CREAT SERPL-MCNC: 0.98 MG/DL (ref 0.66–1.25)
CREAT SERPL-MCNC: 1.05 MG/DL (ref 0.66–1.25)
DIFFERENTIAL METHOD BLD: ABNORMAL
EOSINOPHIL # BLD AUTO: 0 10E9/L (ref 0–0.7)
EOSINOPHIL NFR BLD AUTO: 0.1 %
ERYTHROCYTE [DISTWIDTH] IN BLOOD BY AUTOMATED COUNT: 15.1 % (ref 10–15)
ERYTHROCYTE [DISTWIDTH] IN BLOOD BY AUTOMATED COUNT: 15.5 % (ref 10–15)
FLUAV RNA RESP QL NAA+PROBE: NEGATIVE
FLUBV RNA RESP QL NAA+PROBE: NEGATIVE
GFR SERPL CREATININE-BSD FRML MDRD: 67 ML/MIN/{1.73_M2}
GFR SERPL CREATININE-BSD FRML MDRD: 74 ML/MIN/{1.73_M2}
GLUCOSE BLDC GLUCOMTR-MCNC: 128 MG/DL (ref 70–99)
GLUCOSE BLDC GLUCOMTR-MCNC: 88 MG/DL (ref 70–99)
GLUCOSE SERPL-MCNC: 145 MG/DL (ref 70–99)
GLUCOSE SERPL-MCNC: 72 MG/DL (ref 70–99)
GLUCOSE UR STRIP-MCNC: NEGATIVE MG/DL
GRAM STN SPEC: ABNORMAL
GRAM STN SPEC: ABNORMAL
HCO3 BLDV-SCNC: 19 MMOL/L (ref 21–28)
HCO3 BLDV-SCNC: 22 MMOL/L (ref 21–28)
HCT VFR BLD AUTO: 45.2 % (ref 40–53)
HCT VFR BLD AUTO: 46.1 % (ref 40–53)
HGB BLD-MCNC: 11.8 G/DL (ref 13.3–17.7)
HGB BLD-MCNC: 13.3 G/DL (ref 13.3–17.7)
HGB BLD-MCNC: 13.4 G/DL (ref 13.3–17.7)
HGB UR QL STRIP: ABNORMAL
IMM GRANULOCYTES # BLD: 0.1 10E9/L (ref 0–0.4)
IMM GRANULOCYTES NFR BLD: 0.6 %
INTERPRETATION ECG - MUSE: NORMAL
KETONES UR STRIP-MCNC: NEGATIVE MG/DL
LABORATORY COMMENT REPORT: NORMAL
LACTATE BLD-SCNC: 2.5 MMOL/L (ref 0.7–2)
LACTATE BLD-SCNC: 2.7 MMOL/L (ref 0.7–2)
LACTATE BLD-SCNC: 4.4 MMOL/L (ref 0.7–2)
LACTATE BLD-SCNC: 6.6 MMOL/L (ref 0.7–2)
LACTATE BLD-SCNC: 7.8 MMOL/L (ref 0.7–2)
LEUKOCYTE ESTERASE UR QL STRIP: ABNORMAL
LYMPHOCYTES # BLD AUTO: 0.4 10E9/L (ref 0.8–5.3)
LYMPHOCYTES NFR BLD AUTO: 3.6 %
MAGNESIUM SERPL-MCNC: 1.9 MG/DL (ref 1.6–2.3)
MCH RBC QN AUTO: 27.8 PG (ref 26.5–33)
MCH RBC QN AUTO: 27.9 PG (ref 26.5–33)
MCHC RBC AUTO-ENTMCNC: 28.9 G/DL (ref 31.5–36.5)
MCHC RBC AUTO-ENTMCNC: 29.6 G/DL (ref 31.5–36.5)
MCV RBC AUTO: 94 FL (ref 78–100)
MCV RBC AUTO: 97 FL (ref 78–100)
MONOCYTES # BLD AUTO: 0.2 10E9/L (ref 0–1.3)
MONOCYTES NFR BLD AUTO: 2.1 %
NEUTROPHILS # BLD AUTO: 9.1 10E9/L (ref 1.6–8.3)
NEUTROPHILS NFR BLD AUTO: 93.4 %
NITRATE UR QL: NEGATIVE
NRBC # BLD AUTO: 0 10*3/UL
NRBC BLD AUTO-RTO: 0 /100
O2/TOTAL GAS SETTING VFR VENT: ABNORMAL %
OXYHGB MFR BLDV: 30 %
OXYHGB MFR BLDV: 72 %
PCO2 BLDV: 50 MM HG (ref 40–50)
PCO2 BLDV: 73 MM HG (ref 40–50)
PH BLDV: 7.1 PH (ref 7.32–7.43)
PH BLDV: 7.19 PH (ref 7.32–7.43)
PH UR STRIP: 5.5 PH (ref 5–7)
PLATELET # BLD AUTO: 125 10E9/L (ref 150–450)
PLATELET # BLD AUTO: 196 10E9/L (ref 150–450)
PO2 BLDV: 25 MM HG (ref 25–47)
PO2 BLDV: 44 MM HG (ref 25–47)
POTASSIUM SERPL-SCNC: 4.4 MMOL/L (ref 3.4–5.3)
POTASSIUM SERPL-SCNC: 4.5 MMOL/L (ref 3.4–5.3)
POTASSIUM SERPL-SCNC: 4.6 MMOL/L (ref 3.4–5.3)
PROT SERPL-MCNC: 7.5 G/DL (ref 6.8–8.8)
RBC # BLD AUTO: 4.76 10E12/L (ref 4.4–5.9)
RBC # BLD AUTO: 4.82 10E12/L (ref 4.4–5.9)
RBC #/AREA URNS AUTO: 17 /HPF (ref 0–2)
RSV RNA SPEC QL NAA+PROBE: NORMAL
SARS-COV-2 RNA RESP QL NAA+PROBE: NEGATIVE
SODIUM SERPL-SCNC: 136 MMOL/L (ref 133–144)
SODIUM SERPL-SCNC: 144 MMOL/L (ref 133–144)
SOURCE: ABNORMAL
SP GR UR STRIP: 1.01 (ref 1–1.03)
SPECIMEN SOURCE: ABNORMAL
SPECIMEN SOURCE: NORMAL
SQUAMOUS #/AREA URNS AUTO: 0 /HPF (ref 0–1)
UROBILINOGEN UR STRIP-MCNC: 0 MG/DL (ref 0–2)
WBC # BLD AUTO: 3.6 10E9/L (ref 4–11)
WBC # BLD AUTO: 9.7 10E9/L (ref 4–11)
WBC #/AREA URNS AUTO: >182 /HPF (ref 0–5)
WBC CLUMPS #/AREA URNS HPF: PRESENT /HPF

## 2021-03-09 PROCEDURE — 272N000147 HC KIT CR7

## 2021-03-09 PROCEDURE — 94660 CPAP INITIATION&MGMT: CPT

## 2021-03-09 PROCEDURE — 99291 CRITICAL CARE FIRST HOUR: CPT | Mod: 24 | Performed by: INTERNAL MEDICINE

## 2021-03-09 PROCEDURE — 82805 BLOOD GASES W/O2 SATURATION: CPT | Performed by: INTERNAL MEDICINE

## 2021-03-09 PROCEDURE — 85027 COMPLETE CBC AUTOMATED: CPT | Performed by: INTERNAL MEDICINE

## 2021-03-09 PROCEDURE — 96375 TX/PRO/DX INJ NEW DRUG ADDON: CPT

## 2021-03-09 PROCEDURE — 87086 URINE CULTURE/COLONY COUNT: CPT | Performed by: EMERGENCY MEDICINE

## 2021-03-09 PROCEDURE — 80048 BASIC METABOLIC PNL TOTAL CA: CPT | Performed by: EMERGENCY MEDICINE

## 2021-03-09 PROCEDURE — 96361 HYDRATE IV INFUSION ADD-ON: CPT

## 2021-03-09 PROCEDURE — 83605 ASSAY OF LACTIC ACID: CPT | Performed by: EMERGENCY MEDICINE

## 2021-03-09 PROCEDURE — C1769 GUIDE WIRE: HCPCS

## 2021-03-09 PROCEDURE — 87088 URINE BACTERIA CULTURE: CPT | Performed by: EMERGENCY MEDICINE

## 2021-03-09 PROCEDURE — 87077 CULTURE AEROBIC IDENTIFY: CPT | Performed by: EMERGENCY MEDICINE

## 2021-03-09 PROCEDURE — 258N000001 HC RX 258

## 2021-03-09 PROCEDURE — 999N000157 HC STATISTIC RCP TIME EA 10 MIN

## 2021-03-09 PROCEDURE — 93005 ELECTROCARDIOGRAM TRACING: CPT

## 2021-03-09 PROCEDURE — 200N000001 HC R&B ICU

## 2021-03-09 PROCEDURE — C9803 HOPD COVID-19 SPEC COLLECT: HCPCS

## 2021-03-09 PROCEDURE — 258N000003 HC RX IP 258 OP 636

## 2021-03-09 PROCEDURE — 85018 HEMOGLOBIN: CPT | Performed by: INTERNAL MEDICINE

## 2021-03-09 PROCEDURE — 250N000011 HC RX IP 250 OP 636

## 2021-03-09 PROCEDURE — 0T903ZZ DRAINAGE OF RIGHT KIDNEY, PERCUTANEOUS APPROACH: ICD-10-PCS | Performed by: RADIOLOGY

## 2021-03-09 PROCEDURE — 3E043XZ INTRODUCTION OF VASOPRESSOR INTO CENTRAL VEIN, PERCUTANEOUS APPROACH: ICD-10-PCS | Performed by: INTERNAL MEDICINE

## 2021-03-09 PROCEDURE — 87086 URINE CULTURE/COLONY COUNT: CPT | Performed by: INTERNAL MEDICINE

## 2021-03-09 PROCEDURE — 999N000065 XR CHEST PORT 1 VW

## 2021-03-09 PROCEDURE — 87636 SARSCOV2 & INF A&B AMP PRB: CPT | Performed by: EMERGENCY MEDICINE

## 2021-03-09 PROCEDURE — 87088 URINE BACTERIA CULTURE: CPT | Performed by: INTERNAL MEDICINE

## 2021-03-09 PROCEDURE — 250N000009 HC RX 250: Performed by: RADIOLOGY

## 2021-03-09 PROCEDURE — 71045 X-RAY EXAM CHEST 1 VIEW: CPT

## 2021-03-09 PROCEDURE — 36620 INSERTION CATHETER ARTERY: CPT | Mod: GC | Performed by: INTERNAL MEDICINE

## 2021-03-09 PROCEDURE — C1729 CATH, DRAINAGE: HCPCS

## 2021-03-09 PROCEDURE — 258N000003 HC RX IP 258 OP 636: Performed by: INTERNAL MEDICINE

## 2021-03-09 PROCEDURE — 80048 BASIC METABOLIC PNL TOTAL CA: CPT | Performed by: INTERNAL MEDICINE

## 2021-03-09 PROCEDURE — 250N000013 HC RX MED GY IP 250 OP 250 PS 637: Performed by: EMERGENCY MEDICINE

## 2021-03-09 PROCEDURE — 81001 URINALYSIS AUTO W/SCOPE: CPT | Performed by: EMERGENCY MEDICINE

## 2021-03-09 PROCEDURE — 36556 INSERT NON-TUNNEL CV CATH: CPT | Mod: GC | Performed by: INTERNAL MEDICINE

## 2021-03-09 PROCEDURE — 36415 COLL VENOUS BLD VENIPUNCTURE: CPT

## 2021-03-09 PROCEDURE — 99291 CRITICAL CARE FIRST HOUR: CPT | Mod: 25

## 2021-03-09 PROCEDURE — 83605 ASSAY OF LACTIC ACID: CPT

## 2021-03-09 PROCEDURE — 87205 SMEAR GRAM STAIN: CPT | Performed by: INTERNAL MEDICINE

## 2021-03-09 PROCEDURE — 87186 SC STD MICRODIL/AGAR DIL: CPT | Performed by: INTERNAL MEDICINE

## 2021-03-09 PROCEDURE — 80076 HEPATIC FUNCTION PANEL: CPT | Performed by: EMERGENCY MEDICINE

## 2021-03-09 PROCEDURE — 250N000009 HC RX 250

## 2021-03-09 PROCEDURE — 87186 SC STD MICRODIL/AGAR DIL: CPT | Performed by: EMERGENCY MEDICINE

## 2021-03-09 PROCEDURE — 93010 ELECTROCARDIOGRAM REPORT: CPT | Performed by: INTERNAL MEDICINE

## 2021-03-09 PROCEDURE — 92610 EVALUATE SWALLOWING FUNCTION: CPT | Mod: GN

## 2021-03-09 PROCEDURE — 84132 ASSAY OF SERUM POTASSIUM: CPT | Performed by: INTERNAL MEDICINE

## 2021-03-09 PROCEDURE — 74176 CT ABD & PELVIS W/O CONTRAST: CPT

## 2021-03-09 PROCEDURE — 83605 ASSAY OF LACTIC ACID: CPT | Performed by: INTERNAL MEDICINE

## 2021-03-09 PROCEDURE — 96365 THER/PROPH/DIAG IV INF INIT: CPT

## 2021-03-09 PROCEDURE — 250N000011 HC RX IP 250 OP 636: Performed by: INTERNAL MEDICINE

## 2021-03-09 PROCEDURE — 36415 COLL VENOUS BLD VENIPUNCTURE: CPT | Performed by: INTERNAL MEDICINE

## 2021-03-09 PROCEDURE — 250N000013 HC RX MED GY IP 250 OP 250 PS 637: Performed by: INTERNAL MEDICINE

## 2021-03-09 PROCEDURE — 87800 DETECT AGNT MULT DNA DIREC: CPT | Performed by: EMERGENCY MEDICINE

## 2021-03-09 PROCEDURE — 5A09357 ASSISTANCE WITH RESPIRATORY VENTILATION, LESS THAN 24 CONSECUTIVE HOURS, CONTINUOUS POSITIVE AIRWAY PRESSURE: ICD-10-PCS | Performed by: INTERNAL MEDICINE

## 2021-03-09 PROCEDURE — 85025 COMPLETE CBC W/AUTO DIFF WBC: CPT | Performed by: EMERGENCY MEDICINE

## 2021-03-09 PROCEDURE — 87040 BLOOD CULTURE FOR BACTERIA: CPT | Performed by: EMERGENCY MEDICINE

## 2021-03-09 PROCEDURE — 99223 1ST HOSP IP/OBS HIGH 75: CPT | Mod: AI | Performed by: INTERNAL MEDICINE

## 2021-03-09 PROCEDURE — 258N000003 HC RX IP 258 OP 636: Performed by: EMERGENCY MEDICINE

## 2021-03-09 PROCEDURE — 83735 ASSAY OF MAGNESIUM: CPT | Performed by: INTERNAL MEDICINE

## 2021-03-09 PROCEDURE — 999N001017 HC STATISTIC GLUCOSE BY METER IP

## 2021-03-09 PROCEDURE — 250N000011 HC RX IP 250 OP 636: Performed by: EMERGENCY MEDICINE

## 2021-03-09 PROCEDURE — 50432 PLMT NEPHROSTOMY CATHETER: CPT

## 2021-03-09 RX ORDER — POLYETHYLENE GLYCOL 3350 17 G/17G
17 POWDER, FOR SOLUTION ORAL DAILY PRN
Status: DISCONTINUED | OUTPATIENT
Start: 2021-03-09 | End: 2021-03-22 | Stop reason: HOSPADM

## 2021-03-09 RX ORDER — NALOXONE HYDROCHLORIDE 0.4 MG/ML
0.2 INJECTION, SOLUTION INTRAMUSCULAR; INTRAVENOUS; SUBCUTANEOUS
Status: DISCONTINUED | OUTPATIENT
Start: 2021-03-09 | End: 2021-03-09 | Stop reason: HOSPADM

## 2021-03-09 RX ORDER — NALOXONE HYDROCHLORIDE 0.4 MG/ML
0.4 INJECTION, SOLUTION INTRAMUSCULAR; INTRAVENOUS; SUBCUTANEOUS
Status: DISCONTINUED | OUTPATIENT
Start: 2021-03-09 | End: 2021-03-22 | Stop reason: HOSPADM

## 2021-03-09 RX ORDER — LIDOCAINE 40 MG/G
CREAM TOPICAL
Status: DISCONTINUED | OUTPATIENT
Start: 2021-03-09 | End: 2021-03-22 | Stop reason: HOSPADM

## 2021-03-09 RX ORDER — METOPROLOL TARTRATE 25 MG/1
25 TABLET, FILM COATED ORAL 2 TIMES DAILY
Status: DISCONTINUED | OUTPATIENT
Start: 2021-03-09 | End: 2021-03-12 | Stop reason: DRUGHIGH

## 2021-03-09 RX ORDER — LANOLIN ALCOHOL/MO/W.PET/CERES
3 CREAM (GRAM) TOPICAL
Status: DISCONTINUED | OUTPATIENT
Start: 2021-03-09 | End: 2021-03-22 | Stop reason: HOSPADM

## 2021-03-09 RX ORDER — NOREPINEPHRINE BITARTRATE 0.06 MG/ML
0.03-0.4 INJECTION, SOLUTION INTRAVENOUS CONTINUOUS
Status: DISCONTINUED | OUTPATIENT
Start: 2021-03-09 | End: 2021-03-12

## 2021-03-09 RX ORDER — PROCHLORPERAZINE MALEATE 5 MG
5 TABLET ORAL EVERY 6 HOURS PRN
Status: DISCONTINUED | OUTPATIENT
Start: 2021-03-09 | End: 2021-03-09

## 2021-03-09 RX ORDER — PIPERACILLIN SODIUM, TAZOBACTAM SODIUM 4; .5 G/20ML; G/20ML
4.5 INJECTION, POWDER, LYOPHILIZED, FOR SOLUTION INTRAVENOUS ONCE
Status: COMPLETED | OUTPATIENT
Start: 2021-03-09 | End: 2021-03-09

## 2021-03-09 RX ORDER — PIPERACILLIN SODIUM, TAZOBACTAM SODIUM 4; .5 G/20ML; G/20ML
4.5 INJECTION, POWDER, LYOPHILIZED, FOR SOLUTION INTRAVENOUS EVERY 6 HOURS
Status: DISCONTINUED | OUTPATIENT
Start: 2021-03-09 | End: 2021-03-09

## 2021-03-09 RX ORDER — AMOXICILLIN 250 MG
2 CAPSULE ORAL 2 TIMES DAILY PRN
Status: DISCONTINUED | OUTPATIENT
Start: 2021-03-09 | End: 2021-03-10

## 2021-03-09 RX ORDER — IPRATROPIUM BROMIDE AND ALBUTEROL SULFATE 2.5; .5 MG/3ML; MG/3ML
1 SOLUTION RESPIRATORY (INHALATION) EVERY 4 HOURS PRN
Status: DISCONTINUED | OUTPATIENT
Start: 2021-03-09 | End: 2021-03-22 | Stop reason: HOSPADM

## 2021-03-09 RX ORDER — ACETAMINOPHEN 325 MG/1
650 TABLET ORAL EVERY 4 HOURS PRN
Status: DISCONTINUED | OUTPATIENT
Start: 2021-03-09 | End: 2021-03-10

## 2021-03-09 RX ORDER — NICOTINE POLACRILEX 4 MG
15-30 LOZENGE BUCCAL
Status: DISCONTINUED | OUTPATIENT
Start: 2021-03-09 | End: 2021-03-22 | Stop reason: HOSPADM

## 2021-03-09 RX ORDER — ASPIRIN 300 MG/1
300 SUPPOSITORY RECTAL ONCE
Status: DISCONTINUED | OUTPATIENT
Start: 2021-03-09 | End: 2021-03-10

## 2021-03-09 RX ORDER — NALOXONE HYDROCHLORIDE 0.4 MG/ML
0.2 INJECTION, SOLUTION INTRAMUSCULAR; INTRAVENOUS; SUBCUTANEOUS
Status: DISCONTINUED | OUTPATIENT
Start: 2021-03-09 | End: 2021-03-22 | Stop reason: HOSPADM

## 2021-03-09 RX ORDER — KETOROLAC TROMETHAMINE 15 MG/ML
15 INJECTION, SOLUTION INTRAMUSCULAR; INTRAVENOUS ONCE
Status: COMPLETED | OUTPATIENT
Start: 2021-03-09 | End: 2021-03-09

## 2021-03-09 RX ORDER — PROCHLORPERAZINE 25 MG
12.5 SUPPOSITORY, RECTAL RECTAL EVERY 12 HOURS PRN
Status: DISCONTINUED | OUTPATIENT
Start: 2021-03-09 | End: 2021-03-09

## 2021-03-09 RX ORDER — HEPARIN SODIUM 5000 [USP'U]/.5ML
5000 INJECTION, SOLUTION INTRAVENOUS; SUBCUTANEOUS EVERY 8 HOURS
Status: DISCONTINUED | OUTPATIENT
Start: 2021-03-09 | End: 2021-03-12

## 2021-03-09 RX ORDER — MEROPENEM 1 G/1
1000 INJECTION, POWDER, FOR SOLUTION INTRAVENOUS EVERY 8 HOURS
Status: DISCONTINUED | OUTPATIENT
Start: 2021-03-09 | End: 2021-03-11 | Stop reason: ALTCHOICE

## 2021-03-09 RX ORDER — AMOXICILLIN 250 MG
1 CAPSULE ORAL AT BEDTIME
Status: DISCONTINUED | OUTPATIENT
Start: 2021-03-09 | End: 2021-03-22 | Stop reason: HOSPADM

## 2021-03-09 RX ORDER — FENTANYL CITRATE 50 UG/ML
25-50 INJECTION, SOLUTION INTRAMUSCULAR; INTRAVENOUS EVERY 5 MIN PRN
Status: DISCONTINUED | OUTPATIENT
Start: 2021-03-09 | End: 2021-03-09 | Stop reason: HOSPADM

## 2021-03-09 RX ORDER — METOPROLOL TARTRATE 1 MG/ML
5 INJECTION, SOLUTION INTRAVENOUS ONCE
Status: DISCONTINUED | OUTPATIENT
Start: 2021-03-09 | End: 2021-03-09

## 2021-03-09 RX ORDER — ACETAMINOPHEN 650 MG/1
650 SUPPOSITORY RECTAL EVERY 4 HOURS PRN
Status: DISCONTINUED | OUTPATIENT
Start: 2021-03-09 | End: 2021-03-10

## 2021-03-09 RX ORDER — HYDROMORPHONE HCL IN WATER/PF 6 MG/30 ML
0.2 PATIENT CONTROLLED ANALGESIA SYRINGE INTRAVENOUS
Status: DISCONTINUED | OUTPATIENT
Start: 2021-03-09 | End: 2021-03-09

## 2021-03-09 RX ORDER — OXYCODONE HYDROCHLORIDE 5 MG/1
5 TABLET ORAL
Status: DISCONTINUED | OUTPATIENT
Start: 2021-03-09 | End: 2021-03-09

## 2021-03-09 RX ORDER — SODIUM CHLORIDE AND POTASSIUM CHLORIDE 150; 900 MG/100ML; MG/100ML
INJECTION, SOLUTION INTRAVENOUS CONTINUOUS
Status: DISCONTINUED | OUTPATIENT
Start: 2021-03-09 | End: 2021-03-09

## 2021-03-09 RX ORDER — LIDOCAINE 40 MG/G
CREAM TOPICAL
Status: DISCONTINUED | OUTPATIENT
Start: 2021-03-09 | End: 2021-03-10

## 2021-03-09 RX ORDER — PAROXETINE 20 MG/1
20 TABLET, FILM COATED ORAL EVERY EVENING
Status: DISCONTINUED | OUTPATIENT
Start: 2021-03-09 | End: 2021-03-22 | Stop reason: HOSPADM

## 2021-03-09 RX ORDER — ACETAMINOPHEN 325 MG/1
650 TABLET ORAL ONCE
Status: COMPLETED | OUTPATIENT
Start: 2021-03-09 | End: 2021-03-09

## 2021-03-09 RX ORDER — BISACODYL 10 MG
10 SUPPOSITORY, RECTAL RECTAL DAILY PRN
Status: DISCONTINUED | OUTPATIENT
Start: 2021-03-09 | End: 2021-03-22 | Stop reason: HOSPADM

## 2021-03-09 RX ORDER — DEXTROSE MONOHYDRATE 25 G/50ML
25-50 INJECTION, SOLUTION INTRAVENOUS
Status: DISCONTINUED | OUTPATIENT
Start: 2021-03-09 | End: 2021-03-22 | Stop reason: HOSPADM

## 2021-03-09 RX ORDER — NALOXONE HYDROCHLORIDE 0.4 MG/ML
0.4 INJECTION, SOLUTION INTRAMUSCULAR; INTRAVENOUS; SUBCUTANEOUS
Status: DISCONTINUED | OUTPATIENT
Start: 2021-03-09 | End: 2021-03-09 | Stop reason: HOSPADM

## 2021-03-09 RX ORDER — NITROGLYCERIN 0.4 MG/1
0.4 TABLET SUBLINGUAL EVERY 5 MIN PRN
Status: DISCONTINUED | OUTPATIENT
Start: 2021-03-09 | End: 2021-03-22 | Stop reason: HOSPADM

## 2021-03-09 RX ORDER — METOPROLOL TARTRATE 25 MG/1
25 TABLET, FILM COATED ORAL 2 TIMES DAILY
Status: DISCONTINUED | OUTPATIENT
Start: 2021-03-09 | End: 2021-03-09

## 2021-03-09 RX ORDER — FLUMAZENIL 0.1 MG/ML
0.2 INJECTION, SOLUTION INTRAVENOUS
Status: DISCONTINUED | OUTPATIENT
Start: 2021-03-09 | End: 2021-03-09 | Stop reason: HOSPADM

## 2021-03-09 RX ORDER — LINEZOLID 2 MG/ML
600 INJECTION, SOLUTION INTRAVENOUS EVERY 12 HOURS
Status: DISCONTINUED | OUTPATIENT
Start: 2021-03-09 | End: 2021-03-09 | Stop reason: CLARIF

## 2021-03-09 RX ORDER — ATORVASTATIN CALCIUM 10 MG/1
10 TABLET, FILM COATED ORAL EVERY EVENING
Status: DISCONTINUED | OUTPATIENT
Start: 2021-03-09 | End: 2021-03-22 | Stop reason: HOSPADM

## 2021-03-09 RX ORDER — NOREPINEPHRINE BITARTRATE 0.06 MG/ML
INJECTION, SOLUTION INTRAVENOUS
Status: COMPLETED
Start: 2021-03-09 | End: 2021-03-09

## 2021-03-09 RX ORDER — ACETAMINOPHEN 650 MG/1
650 SUPPOSITORY RECTAL EVERY 4 HOURS PRN
Status: DISCONTINUED | OUTPATIENT
Start: 2021-03-09 | End: 2021-03-09

## 2021-03-09 RX ORDER — ONDANSETRON 2 MG/ML
4 INJECTION INTRAMUSCULAR; INTRAVENOUS EVERY 6 HOURS PRN
Status: DISCONTINUED | OUTPATIENT
Start: 2021-03-09 | End: 2021-03-22 | Stop reason: HOSPADM

## 2021-03-09 RX ORDER — ALLOPURINOL 300 MG/1
300 TABLET ORAL EVERY EVENING
Status: DISCONTINUED | OUTPATIENT
Start: 2021-03-09 | End: 2021-03-22 | Stop reason: HOSPADM

## 2021-03-09 RX ORDER — METOPROLOL TARTRATE 1 MG/ML
5 INJECTION, SOLUTION INTRAVENOUS EVERY 5 MIN PRN
Status: DISCONTINUED | OUTPATIENT
Start: 2021-03-09 | End: 2021-03-10

## 2021-03-09 RX ORDER — AMOXICILLIN 250 MG
1 CAPSULE ORAL 2 TIMES DAILY PRN
Status: DISCONTINUED | OUTPATIENT
Start: 2021-03-09 | End: 2021-03-10

## 2021-03-09 RX ORDER — ONDANSETRON 4 MG/1
4 TABLET, ORALLY DISINTEGRATING ORAL EVERY 6 HOURS PRN
Status: DISCONTINUED | OUTPATIENT
Start: 2021-03-09 | End: 2021-03-22 | Stop reason: HOSPADM

## 2021-03-09 RX ORDER — ACETAMINOPHEN 325 MG/1
650 TABLET ORAL EVERY 4 HOURS PRN
Status: DISCONTINUED | OUTPATIENT
Start: 2021-03-09 | End: 2021-03-09

## 2021-03-09 RX ADMIN — VASOPRESSIN 2.4 UNITS/HR: 20 INJECTION INTRAVENOUS at 18:29

## 2021-03-09 RX ADMIN — METOPROLOL TARTRATE 5 MG: 1 INJECTION, SOLUTION INTRAVENOUS at 13:55

## 2021-03-09 RX ADMIN — DEXTROSE AND SODIUM CHLORIDE: 5; 450 INJECTION, SOLUTION INTRAVENOUS at 17:19

## 2021-03-09 RX ADMIN — HEPARIN SODIUM 5000 UNITS: 5000 INJECTION, SOLUTION INTRAVENOUS; SUBCUTANEOUS at 21:35

## 2021-03-09 RX ADMIN — PIPERACILLIN SODIUM AND TAZOBACTAM SODIUM 4.5 G: 4; .5 INJECTION, POWDER, LYOPHILIZED, FOR SOLUTION INTRAVENOUS at 11:53

## 2021-03-09 RX ADMIN — MEROPENEM 1000 MG: 1 INJECTION, POWDER, FOR SOLUTION INTRAVENOUS at 18:48

## 2021-03-09 RX ADMIN — SODIUM CHLORIDE 1000 ML: 9 INJECTION, SOLUTION INTRAVENOUS at 03:52

## 2021-03-09 RX ADMIN — SODIUM CHLORIDE 1000 ML: 9 INJECTION, SOLUTION INTRAVENOUS at 07:24

## 2021-03-09 RX ADMIN — PIPERACILLIN SODIUM AND TAZOBACTAM SODIUM 4.5 G: 4; .5 INJECTION, POWDER, LYOPHILIZED, FOR SOLUTION INTRAVENOUS at 04:18

## 2021-03-09 RX ADMIN — ACETAMINOPHEN 650 MG: 325 TABLET ORAL at 03:52

## 2021-03-09 RX ADMIN — POTASSIUM CHLORIDE AND SODIUM CHLORIDE: 900; 150 INJECTION, SOLUTION INTRAVENOUS at 11:47

## 2021-03-09 RX ADMIN — KETOROLAC TROMETHAMINE 15 MG: 15 INJECTION, SOLUTION INTRAMUSCULAR; INTRAVENOUS at 05:27

## 2021-03-09 RX ADMIN — ACETAMINOPHEN 650 MG: 650 SUPPOSITORY RECTAL at 15:44

## 2021-03-09 RX ADMIN — ONDANSETRON 4 MG: 2 INJECTION INTRAMUSCULAR; INTRAVENOUS at 14:22

## 2021-03-09 RX ADMIN — SODIUM CHLORIDE 1000 ML: 9 INJECTION, SOLUTION INTRAVENOUS at 14:53

## 2021-03-09 RX ADMIN — SODIUM CHLORIDE 1000 ML: 9 INJECTION, SOLUTION INTRAVENOUS at 04:18

## 2021-03-09 RX ADMIN — Medication 0.06 MCG/KG/MIN: at 16:59

## 2021-03-09 RX ADMIN — METOPROLOL TARTRATE 5 MG: 1 INJECTION, SOLUTION INTRAVENOUS at 14:30

## 2021-03-09 RX ADMIN — TOBRAMYCIN SULFATE 200 MG: 40 INJECTION, SOLUTION INTRAMUSCULAR; INTRAVENOUS at 20:53

## 2021-03-09 RX ADMIN — LIDOCAINE HYDROCHLORIDE 20 ML: 10 INJECTION, SOLUTION INFILTRATION; PERINEURAL at 10:15

## 2021-03-09 RX ADMIN — HEPARIN SODIUM 5000 UNITS: 5000 INJECTION, SOLUTION INTRAVENOUS; SUBCUTANEOUS at 14:02

## 2021-03-09 RX ADMIN — LINEZOLID 600 MG: 600 INJECTION, SOLUTION INTRAVENOUS at 17:11

## 2021-03-09 RX ADMIN — SODIUM CHLORIDE 1000 ML: 9 INJECTION, SOLUTION INTRAVENOUS at 04:32

## 2021-03-09 RX ADMIN — ACETAMINOPHEN 650 MG: 650 SUPPOSITORY RECTAL at 20:49

## 2021-03-09 ASSESSMENT — ENCOUNTER SYMPTOMS
FEVER: 1
COUGH: 0
VOMITING: 0
APPETITE CHANGE: 1
MYALGIAS: 1
ABDOMINAL PAIN: 0
DIARRHEA: 0

## 2021-03-09 ASSESSMENT — ACTIVITIES OF DAILY LIVING (ADL)
ADLS_ACUITY_SCORE: 26

## 2021-03-09 ASSESSMENT — MIFFLIN-ST. JEOR: SCORE: 2005.39

## 2021-03-09 NOTE — CONSULTS
[facility] Inpatient Consultation Note    Ron Gilmore MRN# [id number]   Age: 80 year old YOB: 1940     Date of Admission:  3/9/2021    Reason for consult: Urosepsis, obstructing calculus        Requesting physician: ER Provider                 History of Present Illness:   Pt is a 79yo male who presents to the ER with with fevers of 103F, sob, diaphoresis, chills. He was found to have an elevated lactate, CT reveals a right mid-distal ureteral calculus and urine appears grossly positive for infection. He is hypotensive and tachycardic. Follows with Dr. Sullivan for BPH with outlet obstruction, hx of TURP in 2018; currently has chronic chirinos.     From admission H&P: Ron Gilmore is a 80 year old male who presents to the emergency department after his wife noted him to be febrile to 103 with increased work of breathing, diaphoresis, shaking chills.  Hospitalized 2/8/2021 for acute on chronic hypoxic respiratory failure as well as catheter related urinary tract infection.  Admission at that time actually appears quite similar to current admission.  Patient was noted to be hypoxic to 88% at home, corrected with nasal cannula oxygen.  There was some crackles noted in the left base as well as a question of pneumonia on chest x-ray.  His lactic acid was elevated at 2.2, and despite 2 L of IV volume resuscitation, his lactic acid increased to 4.4.  His heart rate is in the 140s, and his blood pressure is in the 70 systolic range, worsening during his stay in the emergency department.  Patient has a history of chronic indwelling Chirinos catheter associated with his prior CVA as well as some BPH; uncertain if primary contributing factor is neurogenic bladder versus obstructive uropathy.  Patient received IV Zosyn for his sepsis from urinary tract infection in the emergency department, and admission was requested.  Met with patient as well as his wife at the bedside.  Patient has actually become more  tachycardic during his stay in the emergency department, and his blood pressures remain tenuous.  Requested IMC status after his lactic acid improved to the 2 range, though also ordered a noncontrast CT of his abdomen and pelvis given history of nephrolithiasis and now recurrent urinary tract infections.  If patient has obstructive nephrolithiasis, this would actually change plan of care, as patient would require urology intervention and likely ICU stay.  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  Urology was consulted by Dr. Abreu in the emergency department.  Patient continued to decline over the next hours in the emergency department with systolic blood pressures in the 70s range.  I discussed with Dr. Salinas of ICU, as did Dr. Abreu, and patient will be admitted to the ICU.     In discussion with patient's wife, he has some chronic rattling upper airway sounds.  She tells me that he, even at home and when well will have intermittent hypoxia where his oxygen saturations will dip into the 80s before improving.  Has likely obesity hypoventilation, some COPD, and BRAD nonadherent with CPAP.  He tells me that his breathing feels like shit and he generally feels unwell     Earlier today, when he was attempting to eat, he only ate a few bites, developed nausea and retching.  No emesis.  He had some loose stools over the past few days, these are currently normalizing per family bedside.     Note that I did discuss with patient his CODE STATUS.  Historically he has been full code, though currently he is DNR/DNI.  Wife at bedside during this discussion.  He tells me that if his lungs were to fail, he would not want intubation, rather he would prefer supportive cares and a focus on comfort.          Past Medical History:     Past Medical History:   Diagnosis Date     BPH (benign prostatic hyperplasia)      Cataract      Cholelithiasis      COPD (chronic obstructive pulmonary disease) (H)       Depression      Diabetes mellitus     Type 2     Dyshidrotic foot dermatitis      Edema      Gout      Hyperlipidemia      Hypertension      Kidney stones     Bladder Stones     Lumbago      Lumbar disc displacement without myelopathy      Muscle weakness      Neuropathy, diabetic (H)      Obesity      Spinal stenosis      Stroke (H)     with residual effects- weakness LUE> LLE     Unsteady gait      Urinary retention with incomplete bladder emptying      UTI (urinary tract infection)              Past Surgical History:     Past Surgical History:   Procedure Laterality Date     APPENDECTOMY OPEN       ARTHROSCOPY SHOULDER ROTATOR CUFF REPAIR       cataracts Bilateral      CHOLECYSTECTOMY       COLONOSCOPY       CYSTOSCOPY  10/19/2011    Procedure:CYSTOSCOPY; CYSTOSCOPY, BLADDER STONE REMOVAL; Surgeon:ROB SAWYER; Location: OR     CYSTOSCOPY, TRANSURETHRAL RESECTION (TUR) PROSTATE, COMBINED N/A 2/21/2018    Procedure: COMBINED CYSTOSCOPY, TRANSURETHRAL RESECTION (TUR) PROSTATE;  COMBINED CYSTOSCOPY, TRANSURETHRAL RESECTION (TUR) PROSTATE ;  Surgeon: Rob Sawyer MD;  Location:  OR     EP LOOP RECORDER IMPLANT N/A 1/20/2020    Procedure: EP Loop Recorder Implant;  Surgeon: Evgeny Parisi MD;  Location:  [facility]     EYE SURGERY      right lid surgery      JOINT REPLACEMENT Right     HIP     KNEE SURGERY Bilateral      LAMINECTOMY LUMBAR ONE LEVEL       TONSILLECTOMY               Social History:     Social History     Socioeconomic History     Marital status:      Spouse name: Not on file     Number of children: Not on file     Years of education: Not on file     Highest education level: Not on file   Occupational History     Not on file   Social Needs     Financial resource strain: Not on file     Food insecurity     Worry: Not on file     Inability: Not on file     Transportation needs     Medical: Not on file     Non-medical: Not on file   Tobacco Use     Smoking status:  Former Smoker     Smokeless tobacco: Never Used   Substance and Sexual Activity     Alcohol use: No     Comment: none for 29 yrs     Drug use: No     Sexual activity: Not on file   Lifestyle     Physical activity     Days per week: Not on file     Minutes per session: Not on file     Stress: Not on file   Relationships     Social connections     Talks on phone: Not on file     Gets together: Not on file     Attends Sabianist service: Not on file     Active member of club or organization: Not on file     Attends meetings of clubs or organizations: Not on file     Relationship status: Not on file     Intimate partner violence     Fear of current or ex partner: Not on file     Emotionally abused: Not on file     Physically abused: Not on file     Forced sexual activity: Not on file   Other Topics Concern     Parent/sibling w/ CABG, MI or angioplasty before 65F 55M? Not Asked   Social History Narrative     Not on file             Family History:     Family History   Problem Relation Age of Onset     Prostate Cancer Father              Immunizations:     There is no immunization history on file for this patient.          Allergies:   No Known Allergies          Medications:     No current facility-administered medications for this encounter.      Current Outpatient Medications   Medication Sig     acetaminophen (TYLENOL) 325 MG tablet Take 650 mg by mouth every 4 hours as needed for mild pain as needed for pain/fever Max dose 3000mg/24hr     allopurinol (ZYLOPRIM) 300 MG tablet Take 300 mg by mouth daily     aspirin (ASA) 325 MG EC tablet Take 1 tablet (325 mg) by mouth daily     atorvastatin (LIPITOR) 10 MG tablet Take 10 mg by mouth daily     Emollient (AMLACTIN ULTRA EX) Externally apply topically daily as needed APPLY TO FEET      ipratropium - albuterol 0.5 mg/2.5 mg/3 mL 0.5-2.5 (3) MG/3ML IN neb solution Take 1 vial by nebulization every 4 hours as needed      metoprolol tartrate (LOPRESSOR) 25 MG tablet Take 1  tablet (25 mg) by mouth 2 times daily     PARoxetine (PAXIL) 20 MG tablet Take 20 mg by mouth daily             Review of Systems:   Unable to obtain comprehensive review of systems given pt's current state     Examination:  BP 93/58   Pulse 82   Temp 97.7  F (36.5  C) (Rectal)   Resp 16   Ht 1.829 m (6')   Wt 124.7 kg (275 lb)   SpO2 98%   BMI 37.30 kg/m    General: awake, lethargic   HEENT: Face symmetric, mucous membranes moist and pink  Eyes: No scleral icterus  Neck: Symmetric  Chest wall: Symmetric  Respiratory: oxygen mask   Cardiac: Extremities warm and well perfused  Abdomen: obese and soft, non tender, non distended  Back: right CVAT  : chirinos draining clear yellow urine   Extremities: No evidence of deformities or trauma  Neuro:Grossly non focal  Pysch: Normal mood and affect  Skin: No evident rashes or lesions            Data:     Lab Results   Component Value Date    WBC 9.7 03/09/2021     Lab Results   Component Value Date    RBC 4.82 03/09/2021     Lab Results   Component Value Date    HGB 13.4 03/09/2021     Lab Results   Component Value Date    HCT 45.2 03/09/2021     Lab Results   Component Value Date     03/09/2021     Creatinine   Date Value Ref Range Status   03/09/2021 0.98 0.66 - 1.25 mg/dL Final   ]  Lab Results   Component Value Date    BUN 18 03/09/2021       Imaging:  CT ABDOMEN/PELVIS WITHOUT CONTRAST March 9, 2021 7:19 AM     CLINICAL HISTORY: Abdominal pain, fever. Hematuria, unknown cause.     TECHNIQUE: CT scan of the abdomen and pelvis was performed without IV  contrast. Multiplanar reformats were obtained. Dose reduction  techniques were used.  CONTRAST: None.     COMPARISON: CT abdomen and pelvis 10/4/2017.     FINDINGS:   LOWER CHEST: Peribronchial wall thickening and patchy opacity at the  left lung base. Emphysematous changes. Mild atelectasis at the right  base. Diffuse coronary artery calcifications.     HEPATOBILIARY: Cholecystectomy. No acute liver abnormality  at  unenhanced scanning.     PANCREAS: Normal.     SPLEEN: Normal.     ADRENAL GLANDS: Normal.     KIDNEYS/BLADDER: Distal right ureter stone at the pelvic inlet  measures 0.5 cm series 3 image 190. Two adjacent stones within the  bladder just at the right ureterovesical junction. One of these is 0.6  cm while the adjacent stone is 0.5 cm. There are four additional  bladder stones, one of these measuring up to 0.7 cm image 227. There  is moderate right hydronephrosis and right renal edema. Intrarenal  stones on the right as well. Largest stone at the lower right kidney  is 1.3 cm series 3 image 119.     BOWEL: There is an indeterminate wall thickening at distal rectal  level measuring an approximate craniocaudal length of 3.1 cm series 4  image 74, also see series 3 image 226. No obstruction. Remainder of  the bowel shows no acute abnormality.     LYMPH NODES: No enlarged lymph nodes are seen.     VASCULATURE: Calcifications noted. No aneurysm.     PELVIC ORGANS: Normal.     OTHER: No free fluid or free air.     MUSCULOSKELETAL: Right hip arthroplasty. Left hip DJD. Spine  degenerative changes. No destructive process.                                                                      IMPRESSION:   1.  Distal right ureter stone at the pelvic inlet. Two right  ureterovesical junction stones just in the bladder lumen. Associated  moderate right hydronephrosis.  2.  Multiple stones within the bladder and within the right kidney.  3.  Indeterminate wall thickening and decompression of the distal  rectum is identified. A rectal neoplasm could have this appearance and  further workup is recommended with direct visualization.  4.  Patchy airspace opacities at the left lung base. Correlate with  pneumonia.  5.  Coronary artery calcifications.    Impression:  79yo male with a 5mm distal right ureteral calculus and urosepsis     Plan:  To IR emergently for right nephrostomy tube placement   IV abx, await cultures   Likely  couple of days in ICU  Cont chirinos    Likely eventual antegrade stent placement prior to discharge and ureteroscopy for definitive stone management in 2-3wks once infection cleared       Carly Sofia PA-C  [facility]   [email or web address]  Text Page (7:30am to 4:30pm)

## 2021-03-09 NOTE — PRE-PROCEDURE
GENERAL PRE-PROCEDURE:   Procedure:  Right nephrostomy tube placement with intravenous moderate sedation   Date/Time:  3/9/2021 9:40 AM    Written consent obtained?: Yes    Risks and benefits: Risks, benefits and alternatives were discussed    Consent given by:  Patient  Patient states understanding of procedure being performed: Yes    Patient's understanding of procedure matches consent: Yes    Procedure consent matches procedure scheduled: Yes    Expected level of sedation:  Moderate  Appropriately NPO:  Yes  ASA Class:  Class 3- Severe systemic disease, definite functional limitations  Mallampati  :  Grade 1- soft palate, uvula, tonsillar pillars, and posterior pharyngeal wall visible (Unable to follow command)  Lungs:  Lungs clear with good breath sounds bilaterally  Heart:  Normal heart sounds and rate  History & Physical reviewed:  History and physical reviewed and no updates needed  Statement of review:  I have reviewed the lab findings, diagnostic data, medications, and the plan for sedation    Phone consent was obtained from wife Yuli and daughter López after explaining the procedure, risks and benefits and consent is in IR.     Please contact the IR department at [phone] for procedural related questions.     Thanks Lisa Leitschuh CNP Interventional Radiology ([phone])

## 2021-03-09 NOTE — IR NOTE
Interventional Radiology Intra-procedural Nursing Note    Patient Name: Ron Gilmore  Medical Record Number: [id number]  Today's Date: March 9, 2021    Start Time: 1018  End of procedure time: 1029  Procedure: Right Nephrostomy tube placement  Report given to: Suzy TORRES  ICU RN  Time pt departs:  1045    Other Notes: Pt into IR suite 1 via cart. Pt awake and alert. To table in Prone position prepped and drapped with 2%. VSS. Tele SR. Dr. Alonso in room. Time out and procedure started. Pt tolerated procedure well. Debrief with Dr. Alonso. Nephrostomy tube placed. . No complications. Pt transferred to ICU. Report given Suzy Rizzo.    Medications:    Versed 0 mg  Fentanyl 0 mcg  Lidocaine 1%  20ml    Reinaldo Powers RN

## 2021-03-09 NOTE — H&P
[facility]    History and Physical - Hospitalist Service       Date of Admission:  3/9/2021    Assessment & Plan   Ron Gilmore is a 80 year old male admitted on 3/9/2021. He presents the emergency department for evaluation of fevers, shaking chills, hypoxia found to have obstructive nephrolithiasis with associated sepsis.    Severe sepsis: Suspected gram-negative bacteremia associated with urinary tract infection.  Patient with hypotension, tachycardia to the 140s range, fever to 103, shaking chills, tachypnea and hypoxia, lactic acid in the 4 range.  At this time, does not actually have a leukocytosis, however.  Suspected septic encephalopathy:   Catheter associated urinary tract infection: Chronic indwelling Cardona catheter for neurogenic bladder associated with prior CVA, also with a history of BPH.  History of recurrent urinary tract infections with recent admission for the same 2/8/2021.  Note also history of nephrolithiasis and hematuria present with prior urinary tract infections.   -CT noncontrast study requested from the emergency department given history of nephrolithiasis and severe sepsis with recurrent urinary tract infections as well as microscopic hematuria.  Found to have obstructive right renal stones at the UVJ with associated moderate hydronephrosis  -[place] [facility] consulted; will likely need urgent operative intervention  -N.p.o.  -IV Zosyn every 6 hours  -Initial plan had been for IMC status, though with obstructive stone disease and further decompensation while in the emergency department, will go to ICU.  -Blood cultures x2, urine culture pending  -Note that Cardona catheter was exchanged afternoon prior to arrival    Possible left lower lobe pneumonia: Patient with some patchy opacity on chest x-ray as well as CT involving left lung base.  Seems less likely to be etiology of patient's current septic presentation given fevers with suspected bacteremia  -Continue IV  Zosyn    Rectal wall thickening: Distal rectum on CT with indeterminate wall thickening and decompression concerning for neoplasm  -Gastroenterology follow-up for flex sig/colonoscopy.  Might be beneficial to perform this in the inpatient setting given patient's multiple comorbidities and homebound status if and when he recovers from severe sepsis associated with obstructive stone disease.    History of CVA: Baseline left sided hemiparesis  -Resume prior to admission aspirin daily  -Speech pathology consulted given difficulty clearing oral secretions.  Currently on a dysphagia diet with liquid thickened between nectar and honey per family bedside.  -Continue indwelling Cardona catheter, exchanged afternoon prior to arrival    Morbid obesity: BMI greater than 37.  Increased risk of all cause mortality  Obstructive sleep apnea: Patient with some chronic hypoxic respiratory failure secondary to obesity hypoventilation, COPD, and obstructive sleep apnea.  Nonadherent with CPAP at home.  Did have some hypoxia today, though his wife at bedside tells me that he will occasionally have on and off hypoxia at home as well.  -As needed duo nebs as per home regimen  -Oximetry, oxygen as needed.  CPAP at at bedtime if tolerates, though hesitant to do so currently given report of nausea prior to admission and decreased level of alertness in the setting of sepsis  -Suspect some degree of patient's acute on chronic hypoxic respiratory failure with oxygen saturations in the 88% range might have been related to septic vasodilation associated with his likely gram-negative bacteremia.       Diet:      DVT Prophylaxis: Pneumatic Compression Devices  Cardona Catheter: in place, indication:    Code Status:  DNR/DNI.  This was confirmed with patient on admission, wife at bedside.  Note that this is a change from previously documented CODE STATUS         Disposition Plan   Expected discharge: 4 - 7 days, recommended to TCU versus home with  assist of family once SIRS/Sepsis treated.  Entered: Deric Rivera MD 03/09/2021, 7:06 AM     The patient's care was discussed with the Patient and Patient's wife at bedside, Dr. Grant in the emergency department, Dr. Abreu (covering ER provider following CT results), Dr Murrell of ICU,.    Deric Rivera MD  [facility]  Contact information available via [facility] Paging/Directory      ______________________________________________________________________    Chief Complaint   Shaking chills, fevers, malaise    History is obtained from the patient, chart review, discussion with ER providers, patient's wife at bedside, brief review of outside records noting Dr. Sullivan as outpatient urologist    History of Present Illness   Ron Gilmore is a 80 year old male who presents to the emergency department after his wife noted him to be febrile to 103 with increased work of breathing, diaphoresis, shaking chills.  Hospitalized 2/8/2021 for acute on chronic hypoxic respiratory failure as well as catheter related urinary tract infection.  Admission at that time actually appears quite similar to current admission.  Patient was noted to be hypoxic to 88% at home, corrected with nasal cannula oxygen.  There was some crackles noted in the left base as well as a question of pneumonia on chest x-ray.  His lactic acid was elevated at 2.2, and despite 2 L of IV volume resuscitation, his lactic acid increased to 4.4.  His heart rate is in the 140s, and his blood pressure is in the 70 systolic range, worsening during his stay in the emergency department.  Patient has a history of chronic indwelling Cardona catheter associated with his prior CVA as well as some BPH; uncertain if primary contributing factor is neurogenic bladder versus obstructive uropathy.  Patient received IV Zosyn for his sepsis from urinary tract infection in the emergency department, and admission was requested.  Met with patient as well  as his wife at the bedside.  Patient has actually become more tachycardic during his stay in the emergency department, and his blood pressures remain tenuous.  Requested IMC status after his lactic acid improved to the 2 range, though also ordered a noncontrast CT of his abdomen and pelvis given history of nephrolithiasis and now recurrent urinary tract infections.  If patient has obstructive nephrolithiasis, this would actually change plan of care, as patient would require urology intervention and likely ICU stay.  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  Urology was consulted by Dr. Abreu in the emergency department.  Patient continued to decline over the next hours in the emergency department with systolic blood pressures in the 70s range.  I discussed with Dr. Salinas of ICU, as did Dr. Abreu, and patient will be admitted to the ICU.    In discussion with patient's wife, he has some chronic rattling upper airway sounds.  She tells me that he, even at home and when well will have intermittent hypoxia where his oxygen saturations will dip into the 80s before improving.  Has likely obesity hypoventilation, some COPD, and BRAD nonadherent with CPAP.  He tells me that his breathing feels like shit and he generally feels unwell    Earlier today, when he was attempting to eat, he only ate a few bites, developed nausea and retching.  No emesis.  He had some loose stools over the past few days, these are currently normalizing per family bedside.    Note that I did discuss with patient his CODE STATUS.  Historically he has been full code, though currently he is DNR/DNI.  Wife at bedside during this discussion.  He tells me that if his lungs were to fail, he would not want intubation, rather he would prefer supportive cares and a focus on comfort.    Review of Systems    The 10 point Review of Systems is negative other than noted in the HPI or here.  Nausea with retching with oral intake  earlier today  Anorexia    Past Medical History    I have reviewed this patient's medical history and updated it with pertinent information if needed.   Past Medical History:   Diagnosis Date     BPH (benign prostatic hyperplasia)      Cataract      Cholelithiasis      COPD (chronic obstructive pulmonary disease) (H)      Depression      Diabetes mellitus     Type 2     Dyshidrotic foot dermatitis      Edema      Gout      Hyperlipidemia      Hypertension      Kidney stones     Bladder Stones     Lumbago      Lumbar disc displacement without myelopathy      Muscle weakness      Neuropathy, diabetic (H)      Obesity      Spinal stenosis      Stroke (H)     with residual effects- weakness LUE> LLE     Unsteady gait      Urinary retention with incomplete bladder emptying      UTI (urinary tract infection)        Past Surgical History   I have reviewed this patient's surgical history and updated it with pertinent information if needed.  Past Surgical History:   Procedure Laterality Date     APPENDECTOMY OPEN       ARTHROSCOPY SHOULDER ROTATOR CUFF REPAIR       cataracts Bilateral      CHOLECYSTECTOMY       COLONOSCOPY       CYSTOSCOPY  10/19/2011    Procedure:CYSTOSCOPY; CYSTOSCOPY, BLADDER STONE REMOVAL; Surgeon:ROB SAWYER; Location: OR     CYSTOSCOPY, TRANSURETHRAL RESECTION (TUR) PROSTATE, COMBINED N/A 2/21/2018    Procedure: COMBINED CYSTOSCOPY, TRANSURETHRAL RESECTION (TUR) PROSTATE;  COMBINED CYSTOSCOPY, TRANSURETHRAL RESECTION (TUR) PROSTATE ;  Surgeon: Rob Sawyer MD;  Location:  OR     EP LOOP RECORDER IMPLANT N/A 1/20/2020    Procedure: EP Loop Recorder Implant;  Surgeon: Evgeny Parisi MD;  Location:  HEART CARDI[facility] CATH LAB     EYE SURGERY      right lid surgery      JOINT REPLACEMENT Right     HIP     KNEE SURGERY Bilateral      LAMINECTOMY LUMBAR ONE LEVEL       TONSILLECTOMY         Social History   I have reviewed this patient's social history and updated it with pertinent information  if needed.  Social History     Tobacco Use     Smoking status: Former Smoker     Smokeless tobacco: Never Used   Substance Use Topics     Alcohol use: No     Comment: none for 29 yrs     Drug use: No       Family History   I have reviewed this patient's family history and updated it with pertinent information if needed.  Family History   Problem Relation Age of Onset     Prostate Cancer Father        Prior to Admission Medications   Prior to Admission Medications   Prescriptions Last Dose Informant Patient Reported? Taking?   Emollient (AMLACTIN ULTRA EX)  Self Yes No   Sig: Externally apply topically daily as needed APPLY TO FEET    PARoxetine (PAXIL) 20 MG tablet   Yes No   Sig: Take 20 mg by mouth daily   acetaminophen (TYLENOL) 325 MG tablet   Yes No   Sig: Take 650 mg by mouth every 4 hours as needed for mild pain as needed for pain/fever Max dose 3000mg/24hr   allopurinol (ZYLOPRIM) 300 MG tablet   Yes No   Sig: Take 300 mg by mouth daily   aspirin (ASA) 325 MG EC tablet   No No   Sig: Take 1 tablet (325 mg) by mouth daily   atorvastatin (LIPITOR) 10 MG tablet   Yes No   Sig: Take 10 mg by mouth daily   ipratropium - albuterol 0.5 mg/2.5 mg/3 mL 0.5-2.5 (3) MG/3ML IN neb solution   Yes No   Sig: Take 1 vial by nebulization every 4 hours as needed    metoprolol tartrate (LOPRESSOR) 25 MG tablet   No No   Sig: Take 1 tablet (25 mg) by mouth 2 times daily      Facility-Administered Medications: None     Allergies   No Known Allergies    Physical Exam   Vital Signs: Temp: 99.4  F (37.4  C) Temp src: Oral BP: 95/58 Pulse: 126   Resp: 24 SpO2: 96 % O2 Device: Oxymizer cannula Oxygen Delivery: 3 LPM  Weight: 275 lbs 0 oz    General Appearance: Obese, toxic appearing 80-year-old male  Eyes: No scleral icterus or injection  HEENT: Normocephalic, atraumatic   Respiratory: breath sounds are clear bilateral to auscultation.  I do not appreciate any crackles.Poor effort  Cardiovascular: Tachycardia to the 140 range.   Appreciable murmur.  Sinus tachycardia by telemetry at bedside  GI: Abdomen obese.  Suprapubic tenderness to palpation is present.  No other palpable mass or tenderness.  Patient initially with some right-sided tenderness laterally, though on repeat assessment, this is not present  Lymph/Hematologic: Chronic lymphedema bilateral lower extremities  Genitourinary: Indwelling Cardona catheter with straw yellow urine, some sediment versus purulence present  Skin: No rash, no jaundice, though patient is somewhat flushed  Neurologic: Patient is sleepy, alert.  Interacts appropriately provider, though defers much of the history to his family member at bedside.  Psychiatric: Blunted affect.  Pleasant    Data   Data reviewed today: I reviewed all medications, new labs and imaging results over the last 24 hours. I personally reviewed the CT abdomen/pelvis image(s) showing Nephrolithiasis, right-sided hydronephrosis.    Recent Labs   Lab 03/09/21  0346   WBC 9.7   HGB 13.4   MCV 94         POTASSIUM 4.6   CHLORIDE 102   CO2 30   BUN 18   CR 0.98   ANIONGAP 4   RAJ 8.1*   *   ALBUMIN 2.3*   PROTTOTAL 7.5   BILITOTAL 1.1   ALKPHOS 68   ALT 13   AST 14

## 2021-03-09 NOTE — PROGRESS NOTES
"   03/09/21 1400   General Information   Onset of Illness/Injury or Date of Surgery 03/09/21   Referring Physician Carlos Gamboa DO   Patient/Family Therapy Goal Statement (SLP) None stated    Pertinent History of Current Problem Ron Gilmore is a 80 year old male admitted on 3/9/2021. He presents the emergency department for evaluation of fevers, shaking chills, hypoxia found to have obstructive nephrolithiasis with associated sepsis.   General Observations Pt is well known to SLP department from previous admissions. He has completed a video swallow in the past and most recently he was discharged with recommendations for dysphagia diet 2 and honey thick liquids. Per family (as stated in MD note) at home pt consumes \"between nectar and honey thick\" liquids.    Past History of Dysphagia DD2 and honey thick are most recent baseline diet recs   Type of Evaluation   Type of Evaluation Swallow Evaluation   Oral Motor   Oral Musculature anomalies present;unable to assess due to poor participation/comprehension   Structural Abnormalities none present   Mucosal Quality dry   Dentition (Oral Motor)   Dentition (Oral Motor) dental appliance/dentures   Comment, Dentition (Oral Motor) Not placed during evaluation    Vocal Quality/Secretion Management (Oral Motor)   Vocal Quality (Oral Motor) WFL;hoarse   Secretion Management (Oral Motor)   (very wet breath sounds )   General Swallowing Observations   Current Diet/Method of Nutritional Intake (General Swallowing Observations, NIS) NPO   Respiratory Support (General Swallowing Observations) oxygen mask  (15 lpm was recently on CPAP earlier today)   Swallowing Evaluation Clinical swallow evaluation   Clinical Swallow Evaluation   Feeding Assistance dependent   Clinical Swallow Evaluation Textures Trialed Thin Liquids;Honey-Thick Liquids   Clinical Swallow Eval: Thin Liquid Texture Trial   Mode of Presentation, Thin Liquids spoon;fed by clinician   Volume of Liquid or Food " Presented ice chips x 2   Oral Phase of Swallow WFL   Pharyngeal Phase of Swallow impaired;reduction in laryngeal movement  (known aspiration risk with thin )   Clinical Swallow Evaluation: Honey-Thick Liquid Texture Trial   Mode of Presentation, Honey spoon;self-fed   Volume of Honey Presented 3 oz    Oral Phase, Honey Premature pharyngeal entry   Pharyngeal Phase, Honey intact  (appears intact but wet cough prior to and during PO)   Clinical Swallow Evaluation: Puree Solid Texture Trial   Diagnostic Statement None given d/t respiratory status   Swallowing Recommendations   Diet Consistency Recommendations NPO   Supervision Level for Intake 1:1 supervision needed   Mode of Delivery Recommendations bolus size, small;liquids via spoon only;no cups;no straws;slow rate of intake   Monitoring/Assistance Required (Eating/Swallowing) check mouth frequently for oral residue/pocketing;cue for finger/lingual sweep if oral pocketing present;stop eating activities when fatigue is present;monitor for cough or change in vocal quality with intake   Medication Administration Recommendations, Swallowing (SLP) In thickened liquids    General Therapy Interventions   Planned Therapy Interventions Dysphagia Treatment   Dysphagia treatment Oropharyngeal exercise training;Modified diet education;Instruction of safe swallow strategies   SLP Therapy Assessment/Plan   Criteria for Skilled Therapeutic Interventions Met (SLP Eval) yes;treatment indicated   SLP Diagnosis Moderate-severe oropharyngeal dysphagia    Rehab Potential (SLP Eval) good, to achieve stated therapy goals   Therapy Frequency (SLP Eval) 5 times/wk   Predicted Duration of Therapy Intervention (SLP Eval) 2 weeks   Comment, Therapy Assessment/Plan (SLP) Pt seen for dysphagia evaluation.  He is alert and agreeable. He is on 15 lpm and has very wet breath sounds. He consumed ice chips x 2 and 3 oz of honey thick liquids by spoon without overt, obvious indications of aspiration.  Pt has an intermittent congested cough regardless of PO intake so it is difficult to determine risk for aspiration with PO at this time. No further trials given d/t respiratory status and suspect impaired airway protection. Recommend pt remain NPO allowing teaspoons of honey thick liquids for comfort only and only as tolerated. Initiate SLP for dysphagia 5x/week.   Therapy Plan Review/Discharge Plan (SLP)   Therapy Plan Review (SLP) evaluation/treatment results reviewed;care plan/treatment goals reviewed;patient   SLP Discharge Planning    SLP Discharge Recommendation (DC Rec) Transitional Care Facility   SLP Rationale for DC Rec Pt is below baseline    SLP Brief overview of current status  Recommend pt remain NPO allowing teaspoons of honey thick liquids for comfort only and only as tolerated. Initiate SLP for dysphagia 5x/week.    Total Evaluation Time   Total Evaluation Time (Minutes) 18

## 2021-03-09 NOTE — PROGRESS NOTES
[facility]  Patient is currently open to home care services with [facility]. The patient is currently receiving RN services.  and home health team have been notified of patient admission. [facility] liaison will continue to follow patient during stay. If appropriate provide orders to resume home care at time of discharge.    Colleen Perrin RN   [facility] Home Care Liaison   [phone]

## 2021-03-09 NOTE — ED PROVIDER NOTES
Pt signed out by Dr. Garcia, pending CT and admission to IMC for fever and UTI.  Initially elevated lactate, improved after IVF.  Pt now with lower pressures.  CT obtained nad shows obstructing stone with hydronephrosis in the setting of UTI and sepsis.  Urology paged at 07:50 for anticipated surgical intervention.  Multiple pages placed and unable to reach urology.  Urology office able to reach PA on cell with prompt call back 8:51 and then a call back by the on call urologist at 9:02.  Was able to verify pager error with PA, as repeated text and numerical pages were not received in the department.  She will contact the  for pager assistance.  Urology recommended IR for urostomy - arranged by urology.  Pt promptly to IR at 9:40 for intervention, then to be admitted to ICU - Dr. Salinas.    MD Brady Aguilera Karah M, MD  03/09/21 0945

## 2021-03-09 NOTE — ED NOTES
Bed: ED04  Expected date: 3/9/21  Expected time: 3:15 AM  Means of arrival: Ambulance  Comments:  [facility] 80M fever/sob/tachycardia

## 2021-03-09 NOTE — PROGRESS NOTES
Followed up on patient seen by my colleague this AM in the ED.  He worsened with sepsis in the ED and went to IR for a nephrostomy tube that was successfully placed.  He was admitted to the ICU.  He was noted to be cool and warmed hugger.  His hypotension have about 4 liters of fluid + warming + procedure has improved.  He is on zosyn currently for his UTI/sepsis picture.  Looking back at his last few Urine cultures, it appears this would cover his common recent organisms.  His latest SBP is 130 systolic.  He was reportedly on BiPAP but now on CPAP.  He is more awake while earlier he was very somnolent.     He currently is asking if he can come off the CPAP and if he can drink something.    Exam:  Answered all my questions, AO x 3.  Appears ill but comfortable.  Heart regular  Lungs clear though diminished towards bases.  No wheezes/retractions.  ABD non-tender, mildly distended, neph tube in place.  Ext with trace edema.  Neuro:  Chronic left weakness noted.  No new focal deficits noted right now.    Updated plan today:  -  Continue Zosyn  -  Obtain updated labs  -  Try to wean off CPAP, possible diet/swallow eval if can come off  -  Continue ICU care/close monitoring  -  ICU service consult, appreciate their assistance.  I discussed patient with them.

## 2021-03-09 NOTE — ED NOTES
Patient turned and changed. Large soft BM. Patient diaphoretic. Mepilex placed on coccyx, dime size open area on coccyx.

## 2021-03-09 NOTE — PROCEDURES
[facility]    Procedure: Right nephrostomy tube placement.     Date/Time: 3/9/2021 10:30 AM  Performed by: Sandy Alonso DO  Authorized by: Sandy Alonso DO     UNIVERSAL PROTOCOL   Site Marked: Yes  Prior Images Obtained and Reviewed:  Yes  Required items: Required blood products, implants, devices and special equipment available    Patient identity confirmed:  Verbally with patient, arm band, provided demographic data and hospital-assigned identification number  Patient was reevaluated immediately before administering moderate or deep sedation or anesthesia  Confirmation Checklist:  Patient's identity using two indicators, relevant allergies, procedure was appropriate and matched the consent or emergent situation and correct equipment/implants were available  Time out: Immediately prior to the procedure a time out was called    Universal Protocol: the Joint Commission Universal Protocol was followed    Preparation: Patient was prepped and draped in usual sterile fashion           ANESTHESIA    Anesthesia: Local infiltration  Local Anesthetic:  Lidocaine 1% without epinephrine      SEDATION    Patient Sedated: No    Vital signs: Vital signs monitored during sedation    See dictated procedure note for full details.  Findings: Right 10.2 Italian nephrostomy tube placement. 5 mL purulent fluid from the right renal pelvis sent for cultures.     Specimens: none and fluid and/or tissue for gram stain and culture    Complications: None    Condition: Stable    PROCEDURE   Patient Tolerance:  Patient tolerated the procedure well with no immediate complications    Length of time physician/provider present for 1:1 monitoring during sedation: 0

## 2021-03-09 NOTE — ED TRIAGE NOTES
EMS reports being called for fever of 103 at home, by wife and daughter who are caregivers for pt after he had a stroke with chronic left sided deficits. O2SAT on arrival was 88% on RA. Blood pressure has trended down for EMS with the last being 93/56.

## 2021-03-09 NOTE — PHARMACY-ADMISSION MEDICATION HISTORY
Pharmacy Medication History  Admission medication history interview status for the 3/9/2021  admission is complete. See EPIC admission navigator for prior to admission medications     Location of Interview: Phone  Medication history sources: Patient's family/friend (wife Yuli)    Significant changes made to the medication list:  None    In the past week, patient estimated taking medication this percent of the time: greater than 90%    Additional medication history information:   None    Medication reconciliation completed by provider prior to medication history? No    Time spent in this activity: 15 minutes    Prior to Admission medications    Medication Sig Last Dose Taking? Auth Provider   acetaminophen (TYLENOL) 325 MG tablet Take 650 mg by mouth every 4 hours as needed for mild pain as needed for pain/fever Max dose 3000mg/24hr prn Yes Reported, Patient   allopurinol (ZYLOPRIM) 300 MG tablet Take 300 mg by mouth daily 3/8/2021 at pm Yes Unknown, Entered By History   aspirin (ASA) 325 MG EC tablet Take 1 tablet (325 mg) by mouth daily 3/8/2021 at noon Yes Sandro Maradiaga MD   atorvastatin (LIPITOR) 10 MG tablet Take 10 mg by mouth daily 3/8/2021 at pm Yes Unknown, Entered By History   Emollient (AMLACTIN ULTRA EX) Externally apply topically daily as needed APPLY TO FEET  prn Yes Reported, Patient   ipratropium - albuterol 0.5 mg/2.5 mg/3 mL 0.5-2.5 (3) MG/3ML IN neb solution Take 1 vial by nebulization every 4 hours as needed  prn Yes Reported, Patient   metoprolol tartrate (LOPRESSOR) 25 MG tablet Take 1 tablet (25 mg) by mouth 2 times daily 3/8/2021 at pm Yes Iris Mcmanus MD   PARoxetine (PAXIL) 20 MG tablet Take 20 mg by mouth daily 3/8/2021 at pm Yes Unknown, Entered By History       The information provided in this note is only as accurate as the sources available at the time of update(s)     Victoria Quintero, PharmD Student

## 2021-03-09 NOTE — ED NOTES
[facility]  ED Nurse Handoff Report    ED Chief complaint: Fever (EMS reports being called for fever of 103 at home, by wife and daughter who are caregivers for pt after he had a stroke with chronic left sided deficits. O2SAT on arrival was 88% on RA. Blood pressure has trended down for EMS with the last being 93/56.)      ED Diagnosis:   Final diagnoses:   None       Code Status: DNR per admitting provider    Allergies: No Known Allergies    Patient Story: See ED Chief Complaint  Focused Assessment:  Pt initially relaxed and cooperative. Started having shaking chills after arrival. Temp had increased to 102.6 from 99.6 on arrival.     Treatments and/or interventions provided: Tylenol, IV fluids, and oxygen via oxymizer cannula.  Patient's response to treatments and/or interventions: Shaking chills resolved. Pt continues to report general feeling of unwell.    To be done/followed up on inpatient unit:  See admission orders.    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 COVID negative today.  Patient tested for COVID 19 prior to admission: YES  Bariatric?: No    Vital Signs:   Vitals:    03/09/21 0330 03/09/21 0358 03/09/21 0440 03/09/21 0445   BP: 103/50  (!) 175/85    Pulse: 122  61    Resp: 22 30 26 27   Temp: 99.6  F (37.6  C) 102.6  F (39.2  C)     TempSrc: Oral Oral     SpO2: 97% 96%  95%   Weight: 124.7 kg (275 lb)      Height: 1.829 m (6')          Cardiac Rhythm: See EKG    Was the PSS-3 completed:   Yes  What interventions are required if any?  See admission orders.  Family Comments: Family at bedside.  OBS brochure/video discussed/provided to patient/family: N/A  For the majority of the shift this patient's behavior was Green.   Behavioral  interventions performed were None.    ED NURSE PHONE NUMBER: [phone]

## 2021-03-09 NOTE — ED PROVIDER NOTES
"  History   Chief Complaint:  Fever     The history is provided by the patient and the EMS personnel.      Ron Gilmore is a 80 year old male with history of CVA with residual left-sided weakness and chronic indwelling Cardona and hypertension who presents EMS for a fever. EMS report they were called after Natalya's family found he had a fever tonight up to 103F. He was tachycardic in the 140s and had an oxygen saturation of 88% on room air. They administered 750 mL of fluid.    Ron tells me he has felt unwell for the last 2 days, \"like I got beat up.\" He is not surprised he had a fever because he felt very hot. He also mentions that he has been losing his sense of taste slowing over the past week, but did eat a little bit yesterday. Ron otherwise denies any abdominal pain, cough, vomiting or diarrhea.     Of note, Ron was recently admitted for a UTI in February.    Review of Systems   Constitutional: Positive for appetite change (ate only small amounts yesterday) and fever.   Respiratory: Negative for cough.    Gastrointestinal: Negative for abdominal pain, diarrhea and vomiting.   Musculoskeletal: Positive for myalgias.   All other systems reviewed and are negative.    Allergies:  The patient has no known allergies.     Medications:  Zyloprim  Aspirin 325 mg  Emollient  Ipratropium-albuterol neb  Lopressor  Paxil    Past Medical History:    Benign prostatic hyperplasia  Cataract  Cholelithiasis  COPD  Depression  Diabetes mellitus  Dyshidrotic foot dermatitis  Edema  Gout  Hyperlipidemia  Hypertension  Kidney stone  Lumbago  Lumbar disc displacement  Muscle weakness  Diabetic neuropathy  Obesity  Spinal stenosis  Stroke  Unsteady gait  Urinary retention  UTI  Pneumonia  SIRS    Past Surgical History:    Appendectomy  Shoulder arthroscopy  Cataract surgery  Cholecystectomy  Colonoscopy  Cystoscopy  Cystoscopy with TURP  Loop recorder implant  Right eyelid surgery  Hip replacement  Knee surgery " bilateral  Lumbar laminectomy  Tonsillectomy      Family History:    Father: prostate cancer    Social History:  Ron lives at home with his family.  He presents today with EMS. Daughter later at bedside.    Physical Exam     Patient Vitals for the past 24 hrs:   BP Temp Temp src Pulse Resp SpO2 Height Weight   03/09/21 0610 113/69 -- -- 135 -- 96 % -- --   03/09/21 0554 -- 100.3  F (37.9  C) Oral 141 28 96 % -- --   03/09/21 0513 (!) 149/93 102.9  F (39.4  C) Oral 134 28 94 % -- --   03/09/21 0500 -- -- -- 128 28 97 % -- --   03/09/21 0445 -- -- -- -- 27 95 % -- --   03/09/21 0440 (!) 175/85 -- -- 61 26 -- -- --   03/09/21 0358 -- 102.6  F (39.2  C) Oral -- 30 96 % -- --   03/09/21 0330 103/50 99.6  F (37.6  C) Oral 122 22 97 % 1.829 m (6') 124.7 kg (275 lb)       Physical Exam  General: Well-developed and well-nourished. Chronically ill appearing elderly  man. Cooperative.  Head:  Atraumatic.  Eyes:  Conjunctivae, lids, and sclerae are normal.  Neck:  Supple. Normal range of motion.  CV:  Tachycardic rate and regular rhythm. Normal heart sounds with no murmurs, rubs, or gallops detected.  Resp:  No respiratory distress. Clear to auscultation bilaterally without decreased breath sounds, wheezing, rales, or rhonchi.  GI:  Soft. Non-distended. Non-tender.  :  Cardona catheter in place with purulent appearing urine in the catheter tubing.  Very dark yellow urine in the catheter bag.    MS:  Normal ROM.   Skin:  Warm. Non-diaphoretic. No pallor.  Neuro:  Awake. A&Ox3.  Left hemiparesis.  Psych: Normal mood and affect. Normal speech.  Vitals reviewed.    Emergency Department Course   EKG  Indication: Weakness and fever   Time: 0332  Rate 122 bpm. IA interval 162. QRS duration 128. QT/QTc 320/456.   Sinus tachycardia with occasional premature ventricular complexes and fusion complexes  Right bundle branch block   No acute ST changes.  No significant changes as compared to prior, dated 01/20/20.    Imaging:  XR  Chest Port 1 View  Heart size is normal for technique. Thoracic aorta is tortuous. Recording device overlies the left chest. Lung volumes have diminished, with increasing hypoventilatory change. There is some asymmetric increased interstitial density in the   right lung, which could represent early infiltrate.  Reading per radiology.    CT Abdomen Pelvis WO Contrast  Pending    Laboratory:  CBC: WBC 9.7, HGB 13.4,    BMP: Glucose 145 (H), Calcium 8.1 (L) o/w WNL (Creatinine 0.98)     Lactic acid (result time 0401) 2.5 (H)   Lactic acid (result time 0454) 4.4 (H)    Lactic acid (result time 0540) 2.7 (H)      UA with microscopic: Blood moderate, Protein Albumin 30, Leukocyte Esterase large, WBC >182 (H), RBC 17 (H), WBC Clumps present, Bacteria few o/w WNL     Hepatic Panel: Bilirubin Direct 0.4 (H), Albumin 2.3 (L) o/w WNL  Symptomatic Influenza A/B & SARS-CoV2 (COVID19) Virus PCR Multiplex: Negative      Blood cultures pending x2  Urine Culture: Pending     Emergency Department Course:    Reviewed:  I reviewed nursing notes, vitals, past medical history, and Care Everywhere    Assessments:  0337 I obtained history and examined the patient as noted above.     Consults:   0500 I spoke with Dr. Rivera, hospitalist, who agreed to admit the patient.     Interventions:  0352 NS, 1 L, IV bolus  0352 Tylenol 650 mg PO  0418 Zosyn 4.5 g IV   0418 NS, 1 L, IV bolus   0432 NS, 1 L, IV bolus   0527 Toradol 15 mg IV     Disposition:  The patient was admitted to the hospital under the care of Dr. Rivera.  Impression & Plan   CMS Diagnoses:   The patient has signs of Severe Sepsis     The patient has signs of Severe Sepsis as evidenced by:    1. 2 SIRS criteria, AND  2. Suspected infection, AND   3. Organ dysfunction: Lactic Acidosis with value >2.0    Time severe sepsis diagnosis confirmed: 0401 03/09/21 as this was the time when Lactate resulted, and the level was > 2.0    3 Hour Severe Sepsis Bundle Completion:    1.  "Initial Lactic Acid Result:   Recent Labs   Lab Test 03/09/21  0526 03/09/21  0443 03/09/21  0346   LACT 2.7* 4.4* 2.5*     2. Blood Cultures before Antibiotics: Yes  3. Broad Spectrum Antibiotics Administered:  yes       Anti-infectives (From admission through now)    Start     Dose/Rate Route Frequency Ordered Stop    03/09/21 0405  piperacillin-tazobactam (ZOSYN) 4.5 g vial to attach to  mL bag      4.5 g  over 30 Minutes Intravenous ONCE 03/09/21 0403 03/09/21 0443          4. Fluid volume administered in ED:  3,000 mL (30 mL/kg by IBW)    BMI Readings from Last 1 Encounters:   03/09/21 37.30 kg/m      30 mL/kg fluids based on weight: 3,740 mL  30 mL/kg fluids based on IBW (must be >= 60 inches tall): 2,330 mL                Severe Sepsis reassessment:  1. Repeat Lactic Acid Level: 1) 4.4,  2) 2.7  2. MAP>65 after initial IVF bolus, will continue to monitor fluid status and vital signs    I attest to having performed a repeat sepsis exam and assessment of perfusion at 0454 and the results demonstrate no change.    Medical Decision Making:  Ron is a 80-year-old man with a history of left hemiplegia related to CVA who presents feeling generally unwell like he got \"beat up\".  Reportedly his family found him to be febrile to 103  F and paramedics noted hypoxia to 88%.  He denies cough or any other focal symptoms although it should be noted that he was admitted last month for catheter associated Proteus UTI.  He has indwelling Cardona catheter which appears to have purulent urine in the tubing on exam.  He is febrile and tachycardic.  Cultures were obtained and he was empirically treated with Zosyn for suspected catheter associated UTI with greater than 182 white blood cells per high-powered field and present in clumps as well as few bacteria.  He has no leukocytosis but initial lactic acid is elevated at 2.5.  After 2 L of IV fluids his lactic acid actually increased to 4.4.  After the third liter of IV " fluids lactic acid was 2.7.  He remained tachycardic.  He had no kidney injury or electrolyte derangements.  COVID-19 is negative and chest x-ray is generally reassuring.  There is asymmetric increased interstitial density in the right lung which could represent early infiltrate.  This is not impressive on my review of the film and he does not describe cough.  Zosyn would cover lung but I think the primary culprit here is urine.  He was given Tylenol and Toradol with appropriate defervescence.  He was updated on plan for admission and all of his questions were answered.  He verbalized understanding and is amenable.  I discussed the patient's case with Dr. Rivera, hospitalist, who accepts admission.  He requests non-contrast enhanced CT of the abdomen and pelvis which is pending at time of dictation.     Covid-19  Ron Gilmore was evaluated during a global COVID-19 pandemic, which necessitated consideration that the patient might be at risk for infection with the SARS-CoV-2 virus that causes COVID-19.   Applicable protocols for evaluation were followed during the patient's care.   COVID-19 was considered as part of the patient's evaluation. The plan for testing is:  a test was obtained during this visit.    Diagnosis:    ICD-10-CM    1. Severe sepsis (H)  A41.9     R65.20    2. Febrile illness  R50.9    3. Urinary tract infection associated with indwelling urethral catheter, initial encounter (H)  T83.511A     N39.0    4. Lactic acidosis  E87.2        Scribe Disclosure:  I, Benja Fuentes, am serving as a scribe at 3:25 AM on 3/9/2021 to document services personally performed by Lissy Grant MD based on my observations and the provider's statements to me.              Lissy Grant MD  03/09/21 [id number]

## 2021-03-09 NOTE — PROGRESS NOTES
Patient was placed on CPAP 12 40% in the ED. SpO2 94%. Gel pad in place. Alarm volume set at 10.  Will cont to monitor.  3/9/2021  Saritha Harvey RT

## 2021-03-09 NOTE — H&P
Critical Care  Note      03/09/2021    Name: Ron Gilmore MRN#: [id number]   Age: 80 year old YOB: 1940     Hsptl Day# 0  ICU DAY # 0    MV DAY # n/a           Problem List:   Active Problems:    Severe sepsis (H)    Lactic acidosis    Febrile illness    Urinary tract infection associated with indwelling urethral catheter, initial encounter (H)         Summary/Hospital Course:   Mr. Ron Gilmore is a 80 year old male with a past medical history of renal lithiasis, BPH, urinary retension with chronic indwelling Cardona, CVA with residual left-sided weakness, and COPD admitted from the Emergency Department on 3/9/2021 with sepsis secondary to pyelonephritis 2/2 ureterolithiasis on CT abdomen now s/p nephrostomy tube placement by IR the same day.    ED Course:  - 0.9% NaCl 4,000mL  - Tylenol 650 mg PO, Toradol 15 mg IV  - UCx, Bcx x2 -> Zosyn 4.5 g IV  - CXR, CT Head, CT A&P  - CBC, BMP, UA, Lactate      Assessment and plan :     Ron Gilmore IS a 80 year old male admitted on 3/9/2021 for sepsis secondary to pyleonephritis 2/2 ureterolithiasis now s/p right nephrostomy tube placement by IR.  I have reviewed the daily labs, imaging studies, cultures and discussed the case with referring physician and consulting physicians.   My assessment and plan by system for this patient is as follows:    Neurology/Psychiatry:   Awake and alert. Hx of CVA with residual L sided weakness.  Plan  - DISCONTINUE Oxycodone 2.5 mg q6h PRN  - DISCONTINUE Dilaudid 0.2 q3h PRN  - acetaminophen 650 q4h PRN  - melatonin 3 mg PO QHS    Cardiovascular:   CHADSVASC Score: 6 (9.7% annual stroke risk). No ischemic changes on ECG, no significant changes compared to prior on 1/20/20.  1. Afib w/RVR  2. Hypertension  3. Hyperlipidemia  Plan  1. Metoprolol 5mg IV push PRN; if BP permits, start metoprolol 12.5 PO BID  2. PTA Atorvastatin  3. PTA Aspirin    Pulmonary/Ventilator Management:  On 2 L chronic home oxygen. Patient has a  DNR/DNI. Will initiate BIPAP to see if this might help improve his ventilation and acidosis as well as help clear his AMS. If there is improvement, keep BiPAP in place, if there is no improvement over the next few days, can discontinue.  1. COPD  2. BRAD  Plan  - Initiate BIPAP  - DuoNeb q6h PRN     GI and Nutrition:   1. NPO  2. Nausea  Plan  - Await SLP eval  - Start diet pending SLP eval   - Zofran PRN  - Senna BID    Renal/Fluids/Electrolytes:   1. POD #0 IR percutaneous nephrostomy tube, right  2. Lactic acidosis - most recent VBG pH 7.1 @14:15 3/9/21  3. Gout  Plan:  - Recheck + serial lactates q4h for today 3/9/21  - DISCONTINUE: NS + 20 mEq KCl  @ 100 mL/hr  - START: D5 0.45% NS @ 100 mL/hr  - NS 1,000 mL bolus IV now  - Follow CBC, BMP, Lactate  - PTA Allopurinol  - monitor function and electrolytes as needed with replacement per ICU protocols. - generally avoid nephrotoxic agents such as NSAID, IV contrast unless specifically required  - adjust medications as needed for renal clearance  - follow I/O's as appropriate.    Infectious Disease:  1. Urosepsis 2/2 ureterolithiasis,   2. Hydronephrosis  3. Pyuria  4. Hx of VRE UTI  5. Hx of kidney stones  Plan  - IV Zosyn 4.5 q6h  - Linezolid 600 mg q12h  - Follow up cultures    Endocrine:   Monitor for stress induced hyperglycemia.  Plan  - ICU insulin protocol, goal sugar <180    Hematology/Oncology:   1. Hgb 13.4  Plan  - Trend Hgb  - Trend WBCs    IV/Access:   1. Venous access - two large bore PIVs  2. Arterial access - not at this time    ICU Prophylaxis:   1. DVT: Hep Subq  2. Stress Ulcer: PPI/H2 blocker  3. Restraints: Not indicated  4. Wound care  - Not indicated  5. Feeding - pending swallow study/speech eval  6. Family Update: Pending  7. Disposition - ICU with possible transfer tomorrow to the floor    Key goals for next 24 hours:   1. Resolution of sepsis  2. MAP >65 mmHg  3. Rate control of atrial fibrillation         Medical History:     Past Medical  History:   Diagnosis Date     BPH (benign prostatic hyperplasia)      Cataract      Cholelithiasis      COPD (chronic obstructive pulmonary disease) (H)      Depression      Diabetes mellitus     Type 2     Dyshidrotic foot dermatitis      Edema      Gout      Hyperlipidemia      Hypertension      Kidney stones     Bladder Stones     Lumbago      Lumbar disc displacement without myelopathy      Muscle weakness      Neuropathy, diabetic (H)      Obesity      Spinal stenosis      Stroke (H)     with residual effects- weakness LUE> LLE     Unsteady gait      Urinary retention with incomplete bladder emptying      UTI (urinary tract infection)      Past Surgical History:   Procedure Laterality Date     APPENDECTOMY OPEN       ARTHROSCOPY SHOULDER ROTATOR CUFF REPAIR       cataracts Bilateral      CHOLECYSTECTOMY       COLONOSCOPY       CYSTOSCOPY  10/19/2011    Procedure:CYSTOSCOPY; CYSTOSCOPY, BLADDER STONE REMOVAL; Surgeon:ROB SAWYER; Location: OR     CYSTOSCOPY, TRANSURETHRAL RESECTION (TUR) PROSTATE, COMBINED N/A 2/21/2018    Procedure: COMBINED CYSTOSCOPY, TRANSURETHRAL RESECTION (TUR) PROSTATE;  COMBINED CYSTOSCOPY, TRANSURETHRAL RESECTION (TUR) PROSTATE ;  Surgeon: Rob Sawyer MD;  Location:  OR     EP LOOP RECORDER IMPLANT N/A 1/20/2020    Procedure: EP Loop Recorder Implant;  Surgeon: Evgeny Parisi MD;  Location:  [facility]     EYE SURGERY      right lid surgery      JOINT REPLACEMENT Right     HIP     KNEE SURGERY Bilateral      LAMINECTOMY LUMBAR ONE LEVEL       TONSILLECTOMY       Social History     Socioeconomic History     Marital status:      Spouse name: Not on file     Number of children: Not on file     Years of education: Not on file     Highest education level: Not on file   Occupational History     Not on file   Social Needs     Financial resource strain: Not on file     Food insecurity     Worry: Not on file     Inability: Not on file     Transportation  needs     Medical: Not on file     Non-medical: Not on file   Tobacco Use     Smoking status: Former Smoker     Smokeless tobacco: Never Used   Substance and Sexual Activity     Alcohol use: No     Comment: none for 29 yrs     Drug use: No     Sexual activity: Not on file   Lifestyle     Physical activity     Days per week: Not on file     Minutes per session: Not on file     Stress: Not on file   Relationships     Social connections     Talks on phone: Not on file     Gets together: Not on file     Attends [employer] service: Not on file     Active member of club or organization: Not on file     Attends meetings of clubs or organizations: Not on file     Relationship status: Not on file     Intimate partner violence     Fear of current or ex partner: Not on file     Emotionally abused: Not on file     Physically abused: Not on file     Forced sexual activity: Not on file   Other Topics Concern     Parent/sibling w/ CABG, MI or angioplasty before 65F 55M? Not Asked   Social History Narrative     Not on file      No Known Allergies           Key Medications:     Home Meds  No current facility-administered medications on file prior to encounter.   acetaminophen (TYLENOL) 325 MG tablet, Take 650 mg by mouth every 4 hours as needed for mild pain as needed for pain/fever Max dose 3000mg/24hr  allopurinol (ZYLOPRIM) 300 MG tablet, Take 300 mg by mouth daily  aspirin (ASA) 325 MG EC tablet, Take 1 tablet (325 mg) by mouth daily  atorvastatin (LIPITOR) 10 MG tablet, Take 10 mg by mouth daily  Emollient (AMLACTIN ULTRA EX), Externally apply topically daily as needed APPLY TO FEET   ipratropium - albuterol 0.5 mg/2.5 mg/3 mL 0.5-2.5 (3) MG/3ML IN neb solution, Take 1 vial by nebulization every 4 hours as needed   metoprolol tartrate (LOPRESSOR) 25 MG tablet, Take 1 tablet (25 mg) by mouth 2 times daily  PARoxetine (PAXIL) 20 MG tablet, Take 20 mg by mouth daily           Physical Examination:   Temp:  [97.7  F (36.5  C)-102.9   F (39.4  C)] 97.7  F (36.5  C)  Pulse:  [] 85  Resp:  [15-30] 15  BP: ()/(40-93) 90/62  FiO2 (%):  [40 %] 40 %  SpO2:  [93 %-97 %] 95 %    Intake/Output Summary (Last 24 hours) at 3/9/2021 0914  Last data filed at 3/9/2021 0800  Gross per 24 hour   Intake 4000 ml   Output --   Net 4000 ml     Wt Readings from Last 4 Encounters:   03/09/21 124.7 kg (275 lb)   02/08/21 122.5 kg (270 lb)   03/12/20 119.6 kg (263 lb 11.2 oz)   03/10/20 118.1 kg (260 lb 6.4 oz)     BP - Mean:  [] 73  FiO2 (%): 40 %  Resp: 15    No lab results found in last 7 days.    GEN: Minimal distress.  HEENT: head ncat, sclera anicteric, OP patent, trachea midline   PULM: Mildly labored breathing, eased on OxyMask. Bilateral rhonchi with expiratory wheezes anteriorly.  CV/COR: Tachycardic rate, irregular rhythm. S1S2 no gallop,  No rub, no murmur  ABD: soft nontender, hypoactive bowel sounds, no mass.  EXT:  Edema warm  NEURO: grossly intact  SKIN: no obvious rash  LINES: clean, dry intact         Data:   All data and imaging reviewed     ROUTINE ICU LABS (Last four results)  CMP  Recent Labs   Lab 03/09/21  0346      POTASSIUM 4.6   CHLORIDE 102   CO2 30   ANIONGAP 4   *   BUN 18   CR 0.98   GFRESTIMATED 74   GFRESTBLACK 85   RAJ 8.1*   PROTTOTAL 7.5   ALBUMIN 2.3*   BILITOTAL 1.1   ALKPHOS 68   AST 14   ALT 13     CBC  Recent Labs   Lab 03/09/21  0346   WBC 9.7   RBC 4.82   HGB 13.4   HCT 45.2   MCV 94   MCH 27.8   MCHC 29.6*   RDW 15.1*        INRNo lab results found in last 7 days.  Arterial Blood GasNo lab results found in last 7 days.    All cultures:  Recent Labs   Lab 03/09/21  0416 03/09/21  0346   CULT No growth after 2 hours  PENDING No growth after 1 hour     Recent Results (from the past 24 hour(s))   XR Chest Port 1 View    Narrative    EXAM: XR CHEST PORT 1 VW  LOCATION: [facility]  DATE/TIME: 3/9/2021 4:13 AM    INDICATION: Fever  COMPARISON: 02/08/2021      Impression     IMPRESSION: Heart size is normal for technique. Thoracic aorta is tortuous. Recording device overlies the left chest. Lung volumes have diminished, with increasing hypoventilatory change. There is some asymmetric increased interstitial density in the   right lung, which could represent early infiltrate.   CT Abdomen Pelvis w/o Contrast    Narrative    CT ABDOMEN/PELVIS WITHOUT CONTRAST March 9, 2021 7:19 AM    CLINICAL HISTORY: Abdominal pain, fever. Hematuria, unknown cause.    TECHNIQUE: CT scan of the abdomen and pelvis was performed without IV  contrast. Multiplanar reformats were obtained. Dose reduction  techniques were used.  CONTRAST: None.    COMPARISON: CT abdomen and pelvis 10/4/2017.    FINDINGS:   LOWER CHEST: Peribronchial wall thickening and patchy opacity at the  left lung base. Emphysematous changes. Mild atelectasis at the right  base. Diffuse coronary artery calcifications.    HEPATOBILIARY: Cholecystectomy. No acute liver abnormality at  unenhanced scanning.    PANCREAS: Normal.    SPLEEN: Normal.    ADRENAL GLANDS: Normal.    KIDNEYS/BLADDER: Distal right ureter stone at the pelvic inlet  measures 0.5 cm series 3 image 190. Two adjacent stones within the  bladder just at the right ureterovesical junction. One of these is 0.6  cm while the adjacent stone is 0.5 cm. There are four additional  bladder stones, one of these measuring up to 0.7 cm image 227. There  is moderate right hydronephrosis and right renal edema. Intrarenal  stones on the right as well. Largest stone at the lower right kidney  is 1.3 cm series 3 image 119.    BOWEL: There is an indeterminate wall thickening at distal rectal  level measuring an approximate craniocaudal length of 3.1 cm series 4  image 74, also see series 3 image 226. No obstruction. Remainder of  the bowel shows no acute abnormality.    LYMPH NODES: No enlarged lymph nodes are seen.    VASCULATURE: Calcifications noted. No aneurysm.    PELVIC ORGANS:  Normal.    OTHER: No free fluid or free air.    MUSCULOSKELETAL: Right hip arthroplasty. Left hip DJD. Spine  degenerative changes. No destructive process.      Impression    IMPRESSION:   1.  Distal right ureter stone at the pelvic inlet. Two right  ureterovesical junction stones just in the bladder lumen. Associated  moderate right hydronephrosis.  2.  Multiple stones within the bladder and within the right kidney.  3.  Indeterminate wall thickening and decompression of the distal  rectum is identified. A rectal neoplasm could have this appearance and  further workup is recommended with direct visualization.  4.  Patchy airspace opacities at the left lung base. Correlate with  pneumonia.  5.  Coronary artery calcifications.       Billing: This patient is critically ill: Yes. Total critical care time today 30 min.    I, Delvin Newell, MS4, am serving as a medical scribe in the documentation of care for this patient on behaf of Juli Blevins MD.            Physician Attestation   I, Juli Blevins, was present with the medical/YASMEEN student who participated in the service and in the documentation of the note.  I have verified the history and personally performed the physical exam and medical decision making.  I agree with the assessment and plan of care as documented in the note.      I personally reviewed vital signs, medications, labs, imaging and other providers notes.    Sepsis and now progressing to septic shock related to UTI from obstructing stone. Unfortunately has deteriorated throughout the day with decreasing blood pressure, AFib and lactic acidosis, hypercapnia.   Code status is DNR/DNI. Also nearly anuric with no response to IVF.  - Meropenem while awaiting sensitivities of blood cultures  - Vasopressors  - Short trial of Bipap  - if tachycardic again will transition to Amiodarone.     Prognosis extremely guarded.    CC time: 50 minutes not including time spent on procedures  Juli Blevins,  MD  Date of Service (when I saw the patient): 03/09/21

## 2021-03-10 ENCOUNTER — APPOINTMENT (OUTPATIENT)
Dept: SPEECH THERAPY | Facility: CLINIC | Age: 81
DRG: 871 | End: 2021-03-10
Attending: INTERNAL MEDICINE
Payer: COMMERCIAL

## 2021-03-10 ENCOUNTER — APPOINTMENT (OUTPATIENT)
Dept: GENERAL RADIOLOGY | Facility: CLINIC | Age: 81
DRG: 871 | End: 2021-03-10
Payer: COMMERCIAL

## 2021-03-10 LAB
ALBUMIN SERPL-MCNC: 1.7 G/DL (ref 3.4–5)
ALP SERPL-CCNC: 67 U/L (ref 40–150)
ALT SERPL W P-5'-P-CCNC: 25 U/L (ref 0–70)
ANION GAP SERPL CALCULATED.3IONS-SCNC: 13 MMOL/L (ref 3–14)
ANISOCYTOSIS BLD QL SMEAR: SLIGHT
AST SERPL W P-5'-P-CCNC: 51 U/L (ref 0–45)
BACTERIA SPEC CULT: ABNORMAL
BASE DEFICIT BLDA-SCNC: 9.3 MMOL/L
BASOPHILS # BLD AUTO: 0 10E9/L (ref 0–0.2)
BASOPHILS NFR BLD AUTO: 0 %
BILIRUB SERPL-MCNC: 1.2 MG/DL (ref 0.2–1.3)
BUN SERPL-MCNC: 26 MG/DL (ref 7–30)
BURR CELLS BLD QL SMEAR: SLIGHT
CALCIUM SERPL-MCNC: 7.4 MG/DL (ref 8.5–10.1)
CHLORIDE SERPL-SCNC: 113 MMOL/L (ref 94–109)
CO2 SERPL-SCNC: 17 MMOL/L (ref 20–32)
CREAT SERPL-MCNC: 1.24 MG/DL (ref 0.66–1.25)
DIFFERENTIAL METHOD BLD: ABNORMAL
EOSINOPHIL # BLD AUTO: 0 10E9/L (ref 0–0.7)
EOSINOPHIL NFR BLD AUTO: 0 %
ERYTHROCYTE [DISTWIDTH] IN BLOOD BY AUTOMATED COUNT: 15.7 % (ref 10–15)
GFR SERPL CREATININE-BSD FRML MDRD: 55 ML/MIN/{1.73_M2}
GLUCOSE BLDC GLUCOMTR-MCNC: 101 MG/DL (ref 70–99)
GLUCOSE BLDC GLUCOMTR-MCNC: 105 MG/DL (ref 70–99)
GLUCOSE BLDC GLUCOMTR-MCNC: 106 MG/DL (ref 70–99)
GLUCOSE BLDC GLUCOMTR-MCNC: 88 MG/DL (ref 70–99)
GLUCOSE BLDC GLUCOMTR-MCNC: 92 MG/DL (ref 70–99)
GLUCOSE SERPL-MCNC: 126 MG/DL (ref 70–99)
HCO3 BLD-SCNC: 18 MMOL/L (ref 21–28)
HCT VFR BLD AUTO: 41.7 % (ref 40–53)
HGB BLD-MCNC: 12.4 G/DL (ref 13.3–17.7)
INR PPP: 1.58 (ref 0.86–1.14)
LACTATE BLD-SCNC: 3.4 MMOL/L (ref 0.7–2)
LACTATE BLD-SCNC: 5.6 MMOL/L (ref 0.7–2)
LACTATE BLD-SCNC: 5.6 MMOL/L (ref 0.7–2)
LYMPHOCYTES # BLD AUTO: 0.6 10E9/L (ref 0.8–5.3)
LYMPHOCYTES NFR BLD AUTO: 2 %
MAGNESIUM SERPL-MCNC: 1.7 MG/DL (ref 1.6–2.3)
MCH RBC QN AUTO: 27.9 PG (ref 26.5–33)
MCHC RBC AUTO-ENTMCNC: 29.7 G/DL (ref 31.5–36.5)
MCV RBC AUTO: 94 FL (ref 78–100)
METAMYELOCYTES # BLD: 6.1 10E9/L
METAMYELOCYTES NFR BLD MANUAL: 20 %
MONOCYTES # BLD AUTO: 0 10E9/L (ref 0–1.3)
MONOCYTES NFR BLD AUTO: 0 %
NEUTROPHILS # BLD AUTO: 23.9 10E9/L (ref 1.6–8.3)
NEUTROPHILS NFR BLD AUTO: 78 %
O2/TOTAL GAS SETTING VFR VENT: ABNORMAL %
OXYHGB MFR BLD: 98 % (ref 92–100)
PCO2 BLD: 45 MM HG (ref 35–45)
PH BLD: 7.22 PH (ref 7.35–7.45)
PHOSPHATE SERPL-MCNC: 4.5 MG/DL (ref 2.5–4.5)
PLATELET # BLD AUTO: 88 10E9/L (ref 150–450)
PLATELET # BLD EST: ABNORMAL 10*3/UL
PO2 BLD: 184 MM HG (ref 80–105)
POTASSIUM SERPL-SCNC: 4.1 MMOL/L (ref 3.4–5.3)
PROT SERPL-MCNC: 6.4 G/DL (ref 6.8–8.8)
RBC # BLD AUTO: 4.45 10E12/L (ref 4.4–5.9)
SODIUM SERPL-SCNC: 143 MMOL/L (ref 133–144)
SPECIMEN SOURCE: ABNORMAL
WBC # BLD AUTO: 30.6 10E9/L (ref 4–11)

## 2021-03-10 PROCEDURE — 250N000009 HC RX 250: Performed by: OBSTETRICS & GYNECOLOGY

## 2021-03-10 PROCEDURE — 258N000001 HC RX 258: Performed by: INTERNAL MEDICINE

## 2021-03-10 PROCEDURE — 250N000013 HC RX MED GY IP 250 OP 250 PS 637: Performed by: INTERNAL MEDICINE

## 2021-03-10 PROCEDURE — 999N000157 HC STATISTIC RCP TIME EA 10 MIN

## 2021-03-10 PROCEDURE — 99291 CRITICAL CARE FIRST HOUR: CPT | Mod: 24 | Performed by: INTERNAL MEDICINE

## 2021-03-10 PROCEDURE — 84100 ASSAY OF PHOSPHORUS: CPT | Performed by: INTERNAL MEDICINE

## 2021-03-10 PROCEDURE — 80053 COMPREHEN METABOLIC PANEL: CPT | Performed by: INTERNAL MEDICINE

## 2021-03-10 PROCEDURE — 82805 BLOOD GASES W/O2 SATURATION: CPT | Performed by: INTERNAL MEDICINE

## 2021-03-10 PROCEDURE — 94660 CPAP INITIATION&MGMT: CPT

## 2021-03-10 PROCEDURE — 85025 COMPLETE CBC W/AUTO DIFF WBC: CPT | Performed by: INTERNAL MEDICINE

## 2021-03-10 PROCEDURE — 200N000001 HC R&B ICU

## 2021-03-10 PROCEDURE — 92526 ORAL FUNCTION THERAPY: CPT | Mod: GN | Performed by: SPEECH-LANGUAGE PATHOLOGIST

## 2021-03-10 PROCEDURE — 250N000011 HC RX IP 250 OP 636

## 2021-03-10 PROCEDURE — 999N001017 HC STATISTIC GLUCOSE BY METER IP

## 2021-03-10 PROCEDURE — 83605 ASSAY OF LACTIC ACID: CPT | Performed by: INTERNAL MEDICINE

## 2021-03-10 PROCEDURE — 258N000003 HC RX IP 258 OP 636

## 2021-03-10 PROCEDURE — 83735 ASSAY OF MAGNESIUM: CPT | Performed by: INTERNAL MEDICINE

## 2021-03-10 PROCEDURE — 85610 PROTHROMBIN TIME: CPT | Performed by: INTERNAL MEDICINE

## 2021-03-10 PROCEDURE — 999N000065 XR ABDOMEN PORT 1 VW

## 2021-03-10 PROCEDURE — 250N000011 HC RX IP 250 OP 636: Performed by: INTERNAL MEDICINE

## 2021-03-10 PROCEDURE — 250N000009 HC RX 250

## 2021-03-10 RX ORDER — METOCLOPRAMIDE HYDROCHLORIDE 5 MG/ML
10 INJECTION INTRAMUSCULAR; INTRAVENOUS ONCE
Status: COMPLETED | OUTPATIENT
Start: 2021-03-10 | End: 2021-03-10

## 2021-03-10 RX ORDER — LIDOCAINE HYDROCHLORIDE 20 MG/ML
JELLY TOPICAL ONCE
Status: COMPLETED | OUTPATIENT
Start: 2021-03-10 | End: 2021-03-10

## 2021-03-10 RX ORDER — MAGNESIUM SULFATE HEPTAHYDRATE 40 MG/ML
2 INJECTION, SOLUTION INTRAVENOUS ONCE
Status: COMPLETED | OUTPATIENT
Start: 2021-03-10 | End: 2021-03-10

## 2021-03-10 RX ORDER — METOPROLOL TARTRATE 1 MG/ML
2.5 INJECTION, SOLUTION INTRAVENOUS EVERY 6 HOURS
Status: DISCONTINUED | OUTPATIENT
Start: 2021-03-10 | End: 2021-03-12

## 2021-03-10 RX ORDER — HEPARIN SODIUM 5000 [USP'U]/.5ML
5000 INJECTION, SOLUTION INTRAVENOUS; SUBCUTANEOUS EVERY 8 HOURS
Status: CANCELLED | OUTPATIENT
Start: 2021-03-10

## 2021-03-10 RX ORDER — ACETAMINOPHEN 325 MG/1
650 TABLET ORAL EVERY 6 HOURS
Status: DISCONTINUED | OUTPATIENT
Start: 2021-03-10 | End: 2021-03-22 | Stop reason: HOSPADM

## 2021-03-10 RX ADMIN — MEROPENEM 1000 MG: 1 INJECTION, POWDER, FOR SOLUTION INTRAVENOUS at 09:42

## 2021-03-10 RX ADMIN — ALLOPURINOL 300 MG: 300 TABLET ORAL at 19:48

## 2021-03-10 RX ADMIN — HEPARIN SODIUM 5000 UNITS: 5000 INJECTION, SOLUTION INTRAVENOUS; SUBCUTANEOUS at 06:02

## 2021-03-10 RX ADMIN — ACETAMINOPHEN 650 MG: 325 TABLET, FILM COATED ORAL at 19:47

## 2021-03-10 RX ADMIN — MAGNESIUM SULFATE HEPTAHYDRATE 2 G: 40 INJECTION, SOLUTION INTRAVENOUS at 08:27

## 2021-03-10 RX ADMIN — ATORVASTATIN CALCIUM 10 MG: 10 TABLET, FILM COATED ORAL at 19:45

## 2021-03-10 RX ADMIN — VASOPRESSIN 2.4 UNITS/HR: 20 INJECTION INTRAVENOUS at 07:56

## 2021-03-10 RX ADMIN — METOPROLOL TARTRATE 2.5 MG: 1 INJECTION, SOLUTION INTRAVENOUS at 22:31

## 2021-03-10 RX ADMIN — ACETAMINOPHEN 650 MG: 325 TABLET, FILM COATED ORAL at 14:13

## 2021-03-10 RX ADMIN — Medication 0.08 MCG/KG/MIN: at 08:17

## 2021-03-10 RX ADMIN — HEPARIN SODIUM 5000 UNITS: 5000 INJECTION, SOLUTION INTRAVENOUS; SUBCUTANEOUS at 14:17

## 2021-03-10 RX ADMIN — METOCLOPRAMIDE 10 MG: 5 INJECTION, SOLUTION INTRAMUSCULAR; INTRAVENOUS at 18:14

## 2021-03-10 RX ADMIN — MEROPENEM 1000 MG: 1 INJECTION, POWDER, FOR SOLUTION INTRAVENOUS at 18:38

## 2021-03-10 RX ADMIN — PAROXETINE HYDROCHLORIDE 20 MG: 20 TABLET, FILM COATED ORAL at 19:48

## 2021-03-10 RX ADMIN — MEROPENEM 1000 MG: 1 INJECTION, POWDER, FOR SOLUTION INTRAVENOUS at 02:27

## 2021-03-10 RX ADMIN — HEPARIN SODIUM 5000 UNITS: 5000 INJECTION, SOLUTION INTRAVENOUS; SUBCUTANEOUS at 22:18

## 2021-03-10 RX ADMIN — DEXTROSE AND SODIUM CHLORIDE: 5; 450 INJECTION, SOLUTION INTRAVENOUS at 09:35

## 2021-03-10 RX ADMIN — LIDOCAINE HYDROCHLORIDE: 20 JELLY TOPICAL at 18:15

## 2021-03-10 ASSESSMENT — ACTIVITIES OF DAILY LIVING (ADL)
ADLS_ACUITY_SCORE: 22

## 2021-03-10 ASSESSMENT — MIFFLIN-ST. JEOR: SCORE: 1943

## 2021-03-10 NOTE — PLAN OF CARE
Neuro: Left hemiparesis. Numbness in left side. Perrl. Right Eye lids droops. Per patient this is baseline. Alertness waxes and wanes.   Cardio: Hypotensive. Internal jugular placed. Levo infused. Rapid a-fib. Metoprolol given. HR reduced.  GI: Large BM. BS active. Feels nauseous at times. Zofran administered.   : Catheter changed with temp probe. Low UOP MD aware  Resp: Wet cough. On Was on oxymask without issue until PT started to sleep. Bipap restarted.   Skin: Breakdown on coccyx area  Pain: States he generally does not feel well  T-max 103.5 tylenol administered rectally. Ice packs placed.

## 2021-03-10 NOTE — PROGRESS NOTES
MICU Attending Note:  March 10, 2021    I saw and examined the patient with the residents. I reviewed the labs, imaging studies and cultures.    Please see resident/fellow Dr. Salinas's, note from today (March 10, 2021) for details of physical exam, history, medications and labs.  I agree with assessment and plan as documented in said note, with main points listed below.       Ron Gilmore is a 80 year old male admitted on 3/9/2021 for sepsis secondary to pyelonephritis from obstructing renal stones. Nephrostomy tube placed in IR and transferred to ICU for ongoing management. Deteriorated over the afternoon and had central line placed and was started on vasopressors. Also on NIV. Taken off bipap this morning.  Lactic acid has improved somewhat. Pressor needs are stable to decreasing. UOP has increased. E. Coli in blood. Significant thrombocytopenia and anemia. Review of last admission patient was not on heparin. Blood gas shows improvement of hypercapnia. Patient with very weak and wet cough. Having pain when moved but otherwise seems relatively comfortable.   - Hold off on further NIV given risk of aspiration and improvement in pCO2. If patient deteriorates to the point that this is necessary would readdress goals of care  - Vasopressors to maintain MAP. Decrease NE first.  Although lactic acid is slow to clear has good pulses and UOP is increasing.   - Continue Meropenem while awaiting sensitivities.   -Continue heparin for DVT prophylaxis given no recent exposure to heparin (previous encounter reviewed and patient was only on pneumoboots). Hold if thrombocytopenia becomes severe. Check INR.   - Schedule Acetaminophen      Critical Care Time: 40 min.  I spent this time (excluding procedures) personally providing and directing critical care services at the bedside and on the critical care unit.      Juli Blevins MD  [phone]

## 2021-03-10 NOTE — PROGRESS NOTES
Followed up on patient this AM.  Last evening he worsened with declining BP's.  He had lines placed and overnight required norepi and vasopressin to maintain BP's.  He also has had persistent significant respiratory issues with hypoxia.  I discussed patient with Dr. Salinas this AM.  I will have the intensivist service assume care and hospitalist service will sign off until patient improves/ready to transfer out.  Intensivist service to please contact our service when patient improved.

## 2021-03-10 NOTE — PROVIDER NOTIFICATION
Provider Notification    Notified Person Name:  Lukasz     Notification Date/Time:  3/9/21 2000    Notification Interaction:  verbal    Purpose of Notification: Blood culture from right arm taken at 0346 came back with gram neg rods of E-coli

## 2021-03-10 NOTE — PLAN OF CARE
End of Shift Nursing Summary    Neuro:  Lf sided hemiparesis and Rt eye droop which is baseline. Orientated x2. Following commands  Pain: denies pain.  CV:  Sinus rhythm and pressures stable with levo and jeff infusing.  Pulm: On bipap. Lung sounds clear but diminished   GI/: Cardona with marginal to good output. Nephrostomy tube with bloody to cloudy output.  Skin: Scattered bruising. Red blanchable coccyx   Fever: Fever of 102 treated with rectal tylenol and ice packs.  Lines/drips: remains on levo and jeff.

## 2021-03-10 NOTE — PROGRESS NOTES
"Interventional Radiology Progress Note:  Inpatient at [facility]  Date: March 10, 2021   Patient name: Ron Gilmore  MRN:[id number]  :  1940    History: Ron Gilmore is a 80 year old male with a history significant for CVA with left sided weakness, BPH with outlet obstruction, TURP in 2018; with chronic chirinos per urology and nephrolithiasis. He was admitted 3/9 with fevers, chills,  Hypoxemia and was found to be bacteremic upon further testing. CT showed a \"Distal right ureter stone at the pelvic inlet. Two right ureterovesical junction stones just in the bladder lumen. Associated moderate right hydronephrosis and Multiple stones within the bladder and within the right kidney.\"     A right nephrostomy tube was requested for decompression/drainage which was placed on 3/9. Initial urine sampled in IR appeared purulent and grew out gram negative rods. He was transferred to ICU and had issues overnight with septicemia- rising lactate acid level, leukocytosis, febrile, requiring 2 drips to support his BP.     He is being seen in IR follow up today.     Interval History: Answers questions, awake, cooperative, sore    Physical Exam:   Vitals:Temp:  [90.5  F (32.5  C)-103.3  F (39.6  C)] 98.7  F (37.1  C)  Pulse:  [] 114  Resp:  [0-32] 24  BP: ()/() 113/72  MAP:  [63 mmHg-111 mmHg] 82 mmHg  Arterial Line BP: ()/(44-92) 132/55  FiO2 (%):  [50 %] 50 %  SpO2:  [70 %-100 %] 91 %    General/Neuro: Appears Alert, oriented, In no acute distress except with turning Moves all extremities.  Skin: appears edematous, some ecchymosis, dry skin .    Drain:   -Right nephrostomy tube dressing is C/D/I.   -Tube was flushed and aspirated what was flushed in easily with 10 mL saline. Good immediate drainage.   -Tube is draining serous to serosanguinous fluid with sediment     Labs:  ROUTINE ICU LABS (Last four results)  CMP  Recent Labs   Lab 03/10/21  0621 21  1415 21  1146 " 03/09/21  0346    144  --  136   POTASSIUM 4.1 4.5 4.4 4.6   CHLORIDE 113* 113*  --  102   CO2 17* 23  --  30   ANIONGAP 13 8  --  4   * 72  --  145*   BUN 26 20  --  18   CR 1.24 1.05  --  0.98   GFRESTIMATED 55* 67  --  74   GFRESTBLACK 64 78  --  85   RAJ 7.4* 7.9*  --  8.1*   MAG 1.7  --  1.9  --    PHOS 4.5  --   --   --    PROTTOTAL 6.4*  --   --  7.5   ALBUMIN 1.7*  --   --  2.3*   BILITOTAL 1.2  --   --  1.1   ALKPHOS 67  --   --  68   AST 51*  --   --  14   ALT 25  --   --  13     CBC  Recent Labs   Lab 03/10/21  0621 03/09/21  2141 03/09/21  1415 03/09/21  0346   WBC 30.6*  --  3.6* 9.7   RBC 4.45  --  4.76 4.82   HGB 12.4* 11.8* 13.3 13.4   HCT 41.7  --  46.1 45.2   MCV 94  --  97 94   MCH 27.9  --  27.9 27.8   MCHC 29.7*  --  28.9* 29.6*   RDW 15.7*  --  15.5* 15.1*   PLT 88*  --  125* 196     INRNo lab results found in last 7 days.  Arterial Blood Gas  Recent Labs   Lab 03/10/21  0740 03/09/21  1415   PH 7.22*  --    PCO2 45  --    PO2 184*  --    HCO3 18*  --    O2PER 15 L Rikki      Cultures:  Recent Labs   Lab 03/09/21  1023 03/09/21  0416 03/09/21  0346   CULT >100,000 colonies/mL  Escherichia coli  Susceptibility testing in progress  * Cultured on the 1st day of incubation:  Gram negative rods  *  Critical Value/Significant Value, preliminary result only, called to and read back by   Levi Meza RN @5525 3.9.21 LM    Culture in progress  >100,000 colonies/mL  Lactose fermenting gram negative rods  *  Culture in progress Cultured on the 1st day of incubation:  Gram negative rods  *  Critical Value/Significant Value, preliminary result only, called to and read back by  Suzy Rizzo RN at 1739 3.9.21 KZ    Culture in progress  (Note)  POSITIVE for E.COLI by [employer] multiplex nucleic acid test. Final  identification and antimicrobial susceptibility testing will be  verified by standard methods. Verigene test will not distinguish  E.coli from Shigella species including S.dysenteriae,  S.flexneri,  S.boydii, and S.sonnei. Specimens containing Shigella species or  E.coli will be reported as Positive for E.coli.    Specimen tested with [employer] multiplex, gram-negative blood culture  nucleic acid test for the following targets: Acinetobacter sp.,  Citrobacter sp., Enterobacter sp., Proteus sp., E. coli, K.  pneumoniae/oxytoca, P. aeruginosa, and the following resistance  markers: CTXM, KPC, NDM, VIM, IMP and OXA.    Critical Value/Significant Value called to and read back by Levi Castañeda RN at 1954 on 3.9.21 CW           Assessment: Bacteremic from infected renal stones and urine. Increased septic response post nephrostomy tube placement in part from translocation of bacteria with puncture into the kidney and also manipulation of drain which will settle down with VS support, fluids and antibiotics.   -Flushing of the nephrostomy tube helped increase drainage, may also temporarily cause a mild septic response.   -The right ureter is not totally obstructed from the kidney stones so there will be some antegrade urine drainage into the bladder besides into the leg bag.     Plan: Will follow, Will consider a stopcock and flushing of the R nephrostomy tube depending on outputs today    15 minutes were spent with patient during today's visit with greater than 50% of the time spent face to face with the patient, in reviewing medical record and images and in counseling and coordinating patient's care.    Thanks [facility] Interventional Radiology CNP ([phone]) (phone [phone])

## 2021-03-10 NOTE — PROCEDURES
[facility]    Central line    Date/Time: 3/9/2021 7:21 PM  Performed by: Zayda Salinas MD  Authorized by: Zayda Salinas MD   Indications: vascular access    UNIVERSAL PROTOCOL   Site Marked: NA  Prior Images Obtained and Reviewed:  NA  Required items: Required blood products, implants, devices and special equipment available    Patient identity confirmed:  Verbally with patient, arm band and provided demographic data  NA - No sedation, light sedation, or local anesthesia  Confirmation Checklist:  Patient's identity using two indicators, procedure was appropriate and matched the consent or emergent situation and correct equipment/implants were available  Time out: Immediately prior to the procedure a time out was called    Universal Protocol: the Joint Commission Universal Protocol was followed    Preparation: Patient was prepped and draped in usual sterile fashion    ESBL (mL):  2        SEDATION    Patient Sedated: No      Preparation: skin prepped with ChloraPrep  Skin prep agent dried: skin prep agent completely dried prior to procedure  Sterile barriers: all five maximum sterile barriers used - cap, mask, sterile gown, sterile gloves, and large sterile sheet  Hand hygiene: hand hygiene performed prior to central venous catheter insertion  Patient position: Trendelenburg  Catheter type: triple lumen  Catheter size: 7 Fr  Ultrasound guidance: yes  Number of attempts: 2  Successful placement: yes  Post-procedure: line sutured  Assessment: blood return through all ports,  free fluid flow,  placement verified by x-ray and no pneumothorax on x-ray    PROCEDURE   Patient Tolerance:  Patient tolerated the procedure well with no immediate complications    Length of time physician/provider present for 1:1 monitoring during sedation: 20

## 2021-03-10 NOTE — PLAN OF CARE
Neuro: Baseline left hemiparesis. Baseline right eyelid droop. PERRL. Oriented when awake needs reorientation after napping.  Cardio: Off Vasopressin. 0.04 Levophed to maintain MAP >65  Resp: LS diminished. Coarse on Left. 15L Oxymask  GI: BS active. Okayed for pudding and honey thick liquids when awake and at 90 degrees. NJ placed. Currently NOT post pyloric  : Adequate UOP  Complained of generalized pain. Scheduled tylenol.   Skin: breakdown on scrotum and coccyx. WOC ordered.   Tmax 100.2

## 2021-03-10 NOTE — PHARMACY-AMINOGLYCOSIDE DOSING SERVICE
Pharmacy Aminoglycoside Initial Note  Date of Service 2021  Patient's  1940  80 year old, male    Weight (Adjusted):  96.5 kg    Indication: Bacteremia    Current estimated CrCl = Estimated Creatinine Clearance: 79.1 mL/min (based on SCr of 1.05 mg/dL).    Creatinine for last 3 days  3/9/2021:  3:46 AM Creatinine 0.98 mg/dL;  2:15 PM Creatinine 1.05 mg/dL     Nephrotoxins and other renal medications (From now, onward)    Start     Dose/Rate Route Frequency Ordered Stop    21 2030  tobramycin (NEBCIN) 200 mg in sodium chloride 0.9 % intermittent infusion      2 mg/kg × 96.4 kg (Adjusted)  over 60 Minutes Intravenous ONCE 21 1956      21 1800  vasopressin 40 units in NS 40 mL (PITRESSIN) infusion      2.4 Units/hr  2.4 mL/hr  Intravenous CONTINUOUS 21 1736      21 1700  norepinephrine (LEVOPHED) 16 mg in  mL infusion CENTRAL LINE      0.03-0.4 mcg/kg/min × 124.7 kg  3.5-46.8 mL/hr  Intravenous CONTINUOUS 21 1637            Contrast Orders - past 72 hours (72h ago, onward)    Start     Dose/Rate Route Frequency Ordered Stop    21 1030  iopamidol (ISOVUE-300) solution 61% 50 mL      50 mL NEPHROSTOMY TUBE ONCE 21 1003 21 1033          Aminoglycoside Levels - past 2 days  No results found for requested labs within last 48 hours.    Aminoglycosides IV Administrations (past 72 hours)      No aminoglycosides orders with administrations in past 72 hours.                    Plan:  1.   Tobramycin 200 mg (2 mg/kg) IV once per consult for one time order. 2 mg/kg conventional dose for decrease urine & nephrostomy tube output.  2.  Target goals based on conventional dosing  3.  Goal peak level: 6-10 mg/L  4.  Goal trough level: <1 mg/L  5.  Pharmacy will continue to follow and order further doses/levels as directed by provider.    [facility], located within [facility]

## 2021-03-10 NOTE — PROGRESS NOTES
UROLOGY PROGRESS NOTE    March 10, 2021    SUBJECTIVE:  Patient feeling weak.  Had hypotension, hypoxia overnight.  Requiring norepi and vasopressin.  Also requiring 15L O2 via oxymask.  Urine and blood cultures both with E coli.     Lactic acid 5.6  WBC 3.6 --> 30.6  BCx E coli  UCx E coli    OBJECTIVE:  Temp:  [90.5  F (32.5  C)-103.3  F (39.6  C)] 98.7  F (37.1  C)  Pulse:  [] 114  Resp:  [0-32] 24  BP: ()/() 113/72  MAP:  [63 mmHg-111 mmHg] 82 mmHg  Arterial Line BP: ()/(44-92) 132/55  FiO2 (%):  [50 %] 50 %  SpO2:  [70 %-100 %] 91 %    Intake/Output Summary (Last 24 hours) at 3/10/2021 1108  Last data filed at 3/10/2021 1000  Gross per 24 hour   Intake 2334.9 ml   Output 1315 ml   Net 1019.9 ml       GENERAL:  Awake, alert, resting in bed, oxymask in place  HEAD: Normocephalic atraumatic  SCLERA: Anicteric  EXTREMITIES: Warm and well perfused  : Right PCN red tinged urine; Cardona with allan output    LABS:  Lab Results   Component Value Date    WBC 30.6 03/10/2021     Lab Results   Component Value Date    HGB 12.4 03/10/2021     Lab Results   Component Value Date    HCT 41.7 03/10/2021     Lab Results   Component Value Date    PLT 88 03/10/2021     Last Basic Metabolic Panel:  Lab Results   Component Value Date     03/10/2021      Lab Results   Component Value Date    POTASSIUM 4.1 03/10/2021     Lab Results   Component Value Date    CHLORIDE 113 03/10/2021     Lab Results   Component Value Date    RAJ 7.4 03/10/2021     Lab Results   Component Value Date    CO2 17 03/10/2021     Lab Results   Component Value Date    BUN 26 03/10/2021     Lab Results   Component Value Date    CR 1.24 03/10/2021     Lab Results   Component Value Date     03/10/2021       ASSESSMENT/PLAN: 80 y.o man with distal right ureteral stone, urosepsis.    1. Distal right ureteral stone  - S/p right PCN on 3/9 with IR  - Continue right PCN  - Continue Cardona for now  - Likely eventual antegrade stent  placement prior to discharge once patient more stable then definitive stone management with ureteroscopy, lithotripsy 2-3 weeks once infection adequately treated    2. Urosepsis  - UCx and BCx with E coli  - Abx per IM/ICU team, sensitivities still pending      Rufina Neville PA-C  [facility]  Office Phone: [phone]  After 4pm and on weekends, please call [phone]

## 2021-03-10 NOTE — PLAN OF CARE
SLP - Swallow: Pt was alert and cooperative, asking for po, RN stated pt stable for eval on oxymask at 15L, ok to change to oxymizer, and asked SLP to see pt for po trials.  Pt had sat dip briefly x 2 to 85-86% (RN felt monitor not reading correctly), no overt aspiration signs with mod-max cues/assist to swallow hard, use strategies with honey by spoon x 5, pudding by spoon x 3.      Sat decrease reported briefly after session by RN.  Educated on POC, need to hold po if respiratory status declines.  RN verbalized understanding    Recommend pt remain NPO except for single teaspoons of honey thick liquids/puree for comfort with 1:1 supervision/assist, slow rate, sit at 90 degrees, verify/cue swallows, by spoon only, ONLY WITH STABLE RESPIRATORY STATUS/saturation levels 90% and above.  Hold po if respiratory status declines.  RN education provided.

## 2021-03-10 NOTE — PROVIDER NOTIFICATION
Provider Notification    Notified Person Name:  Med student     Notification Date/Time:  3/9/21 3978    Notification Interaction:  Verbal     Purpose of Notification: critical labs: lactic acid 6.6  Venous pH 7.16

## 2021-03-10 NOTE — PROCEDURES
[facility]    Arterial line placement    Date/Time: 3/9/2021 7:25 PM  Performed by: Zayda Salinas MD  Authorized by: Zayda Salinas MD     UNIVERSAL PROTOCOL   Site Marked: NA  Prior Images Obtained and Reviewed:  NA  Required items: Required blood products, implants, devices and special equipment available    Patient identity confirmed:  Verbally with patient, provided demographic data and arm band  NA - No sedation, light sedation, or local anesthesia  Confirmation Checklist:  Patient's identity using two indicators, correct equipment/implants were available and procedure was appropriate and matched the consent or emergent situation  Time out: Immediately prior to the procedure a time out was called    Universal Protocol: the Joint Commission Universal Protocol was followed    Preparation: Patient was prepped and draped in usual sterile fashion    ESBL (mL):  3      Indication: multiple ABGs hemodynamic monitoring  Location: left femoral      SEDATION    Patient Sedated: No      PROCEDURE DETAILS    Needle Gauge:  20  Seldinger technique: Seldinger technique used    Number of Attempts:  1  Post-procedure:  Line sutured    PROCEDURE   Patient Tolerance:  Patient tolerated the procedure well with no immediate complications  Describe Procedure: 2 attempts at left radial arterial line unsucessful prior to femoral line placement.  Length of time physician/provider present for 1:1 monitoring during sedation: 0

## 2021-03-10 NOTE — PROVIDER NOTIFICATION
Provider Notification    Notified Person Name:  Med student     Notification Date/Time:  3/10/21 0652    Notification Interaction:  verbal    Purpose of Notification: Lactic Acid level: 5.6

## 2021-03-11 ENCOUNTER — APPOINTMENT (OUTPATIENT)
Dept: SPEECH THERAPY | Facility: CLINIC | Age: 81
DRG: 871 | End: 2021-03-11
Payer: COMMERCIAL

## 2021-03-11 ENCOUNTER — APPOINTMENT (OUTPATIENT)
Dept: GENERAL RADIOLOGY | Facility: CLINIC | Age: 81
DRG: 871 | End: 2021-03-11
Attending: INTERNAL MEDICINE
Payer: COMMERCIAL

## 2021-03-11 LAB
ANION GAP SERPL CALCULATED.3IONS-SCNC: 7 MMOL/L (ref 3–14)
BACTERIA SPEC CULT: ABNORMAL
BASE DEFICIT BLDA-SCNC: 1.1 MMOL/L
BASE EXCESS BLDA CALC-SCNC: 1.1 MMOL/L
BASOPHILS # BLD AUTO: 0 10E9/L (ref 0–0.2)
BASOPHILS NFR BLD AUTO: 0 %
BUN SERPL-MCNC: 26 MG/DL (ref 7–30)
BURR CELLS BLD QL SMEAR: SLIGHT
CALCIUM SERPL-MCNC: 7.8 MG/DL (ref 8.5–10.1)
CHLORIDE SERPL-SCNC: 113 MMOL/L (ref 94–109)
CO2 SERPL-SCNC: 24 MMOL/L (ref 20–32)
CORTIS SERPL-MCNC: 49.9 UG/DL (ref 4–22)
CREAT SERPL-MCNC: 0.95 MG/DL (ref 0.66–1.25)
DIFFERENTIAL METHOD BLD: ABNORMAL
EOSINOPHIL # BLD AUTO: 0 10E9/L (ref 0–0.7)
EOSINOPHIL NFR BLD AUTO: 0 %
ERYTHROCYTE [DISTWIDTH] IN BLOOD BY AUTOMATED COUNT: 15.9 % (ref 10–15)
FIBRINOGEN PPP-MCNC: 343 MG/DL (ref 200–420)
GFR SERPL CREATININE-BSD FRML MDRD: 76 ML/MIN/{1.73_M2}
GLUCOSE BLDC GLUCOMTR-MCNC: 112 MG/DL (ref 70–99)
GLUCOSE BLDC GLUCOMTR-MCNC: 78 MG/DL (ref 70–99)
GLUCOSE BLDC GLUCOMTR-MCNC: 90 MG/DL (ref 70–99)
GLUCOSE BLDC GLUCOMTR-MCNC: 97 MG/DL (ref 70–99)
GLUCOSE SERPL-MCNC: 109 MG/DL (ref 70–99)
HCO3 BLD-SCNC: 26 MMOL/L (ref 21–28)
HCO3 BLD-SCNC: 29 MMOL/L (ref 21–28)
HCT VFR BLD AUTO: 38.6 % (ref 40–53)
HGB BLD-MCNC: 11.6 G/DL (ref 13.3–17.7)
LACTATE BLD-SCNC: 1.3 MMOL/L (ref 0.7–2)
LACTATE BLD-SCNC: 1.5 MMOL/L (ref 0.7–2)
LACTATE BLD-SCNC: 2 MMOL/L (ref 0.7–2)
LACTATE BLD-SCNC: 2.3 MMOL/L (ref 0.7–2)
LYMPHOCYTES # BLD AUTO: 0.3 10E9/L (ref 0.8–5.3)
LYMPHOCYTES NFR BLD AUTO: 1 %
Lab: ABNORMAL
MAGNESIUM SERPL-MCNC: 2.1 MG/DL (ref 1.6–2.3)
MCH RBC QN AUTO: 27.5 PG (ref 26.5–33)
MCHC RBC AUTO-ENTMCNC: 30.1 G/DL (ref 31.5–36.5)
MCV RBC AUTO: 92 FL (ref 78–100)
METAMYELOCYTES # BLD: 3.4 10E9/L
METAMYELOCYTES NFR BLD MANUAL: 13.5 %
MONOCYTES # BLD AUTO: 0.5 10E9/L (ref 0–1.3)
MONOCYTES NFR BLD AUTO: 2 %
MYELOCYTES NFR BLD MANUAL: 3 %
NEUTROPHILS # BLD AUTO: 20.1 10E9/L (ref 1.6–8.3)
NEUTROPHILS NFR BLD AUTO: 80.5 %
O2/TOTAL GAS SETTING VFR VENT: ABNORMAL %
O2/TOTAL GAS SETTING VFR VENT: ABNORMAL %
OXYHGB MFR BLD: 97 % (ref 92–100)
OXYHGB MFR BLD: 98 % (ref 92–100)
PCO2 BLD: 57 MM HG (ref 35–45)
PCO2 BLD: 59 MM HG (ref 35–45)
PH BLD: 7.28 PH (ref 7.35–7.45)
PH BLD: 7.29 PH (ref 7.35–7.45)
PHOSPHATE SERPL-MCNC: 3.3 MG/DL (ref 2.5–4.5)
PLATELET # BLD AUTO: 40 10E9/L (ref 150–450)
PLATELET # BLD EST: ABNORMAL 10*3/UL
PO2 BLD: 130 MM HG (ref 80–105)
PO2 BLD: 161 MM HG (ref 80–105)
POTASSIUM SERPL-SCNC: 4 MMOL/L (ref 3.4–5.3)
RBC # BLD AUTO: 4.22 10E12/L (ref 4.4–5.9)
SODIUM SERPL-SCNC: 144 MMOL/L (ref 133–144)
SPECIMEN SOURCE: ABNORMAL
WBC # BLD AUTO: 25 10E9/L (ref 4–11)

## 2021-03-11 PROCEDURE — 250N000009 HC RX 250: Performed by: OBSTETRICS & GYNECOLOGY

## 2021-03-11 PROCEDURE — 999N001017 HC STATISTIC GLUCOSE BY METER IP

## 2021-03-11 PROCEDURE — 82805 BLOOD GASES W/O2 SATURATION: CPT | Performed by: INTERNAL MEDICINE

## 2021-03-11 PROCEDURE — G0463 HOSPITAL OUTPT CLINIC VISIT: HCPCS

## 2021-03-11 PROCEDURE — 83735 ASSAY OF MAGNESIUM: CPT

## 2021-03-11 PROCEDURE — 92526 ORAL FUNCTION THERAPY: CPT | Mod: GN | Performed by: SPEECH-LANGUAGE PATHOLOGIST

## 2021-03-11 PROCEDURE — 250N000011 HC RX IP 250 OP 636: Performed by: INTERNAL MEDICINE

## 2021-03-11 PROCEDURE — 85384 FIBRINOGEN ACTIVITY: CPT | Performed by: OBSTETRICS & GYNECOLOGY

## 2021-03-11 PROCEDURE — 94799 UNLISTED PULMONARY SVC/PX: CPT

## 2021-03-11 PROCEDURE — 83605 ASSAY OF LACTIC ACID: CPT

## 2021-03-11 PROCEDURE — 80048 BASIC METABOLIC PNL TOTAL CA: CPT

## 2021-03-11 PROCEDURE — 82533 TOTAL CORTISOL: CPT | Performed by: INTERNAL MEDICINE

## 2021-03-11 PROCEDURE — 85025 COMPLETE CBC W/AUTO DIFF WBC: CPT

## 2021-03-11 PROCEDURE — 258N000003 HC RX IP 258 OP 636

## 2021-03-11 PROCEDURE — 84100 ASSAY OF PHOSPHORUS: CPT

## 2021-03-11 PROCEDURE — 258N000001 HC RX 258: Performed by: INTERNAL MEDICINE

## 2021-03-11 PROCEDURE — 250N000011 HC RX IP 250 OP 636

## 2021-03-11 PROCEDURE — 999N000157 HC STATISTIC RCP TIME EA 10 MIN

## 2021-03-11 PROCEDURE — 999N000065 XR ABDOMEN PORT 1 VW

## 2021-03-11 PROCEDURE — 83605 ASSAY OF LACTIC ACID: CPT | Performed by: INTERNAL MEDICINE

## 2021-03-11 PROCEDURE — 200N000001 HC R&B ICU

## 2021-03-11 PROCEDURE — 250N000013 HC RX MED GY IP 250 OP 250 PS 637: Performed by: INTERNAL MEDICINE

## 2021-03-11 PROCEDURE — 272N000078 HC NUTRITION PRODUCT INTERMEDIATE LITER

## 2021-03-11 PROCEDURE — 82805 BLOOD GASES W/O2 SATURATION: CPT | Performed by: OBSTETRICS & GYNECOLOGY

## 2021-03-11 RX ORDER — DEXTROSE MONOHYDRATE 100 MG/ML
INJECTION, SOLUTION INTRAVENOUS CONTINUOUS PRN
Status: DISCONTINUED | OUTPATIENT
Start: 2021-03-11 | End: 2021-03-22 | Stop reason: HOSPADM

## 2021-03-11 RX ORDER — CEFTRIAXONE 2 G/1
2 INJECTION, POWDER, FOR SOLUTION INTRAMUSCULAR; INTRAVENOUS EVERY 24 HOURS
Status: DISCONTINUED | OUTPATIENT
Start: 2021-03-11 | End: 2021-03-17

## 2021-03-11 RX ADMIN — CEFTRIAXONE SODIUM 2 G: 2 INJECTION, POWDER, FOR SOLUTION INTRAMUSCULAR; INTRAVENOUS at 12:07

## 2021-03-11 RX ADMIN — DEXTROSE AND SODIUM CHLORIDE: 5; 450 INJECTION, SOLUTION INTRAVENOUS at 09:16

## 2021-03-11 RX ADMIN — ATORVASTATIN CALCIUM 10 MG: 10 TABLET, FILM COATED ORAL at 20:21

## 2021-03-11 RX ADMIN — HEPARIN SODIUM 5000 UNITS: 5000 INJECTION, SOLUTION INTRAVENOUS; SUBCUTANEOUS at 06:30

## 2021-03-11 RX ADMIN — METOPROLOL TARTRATE 2.5 MG: 1 INJECTION, SOLUTION INTRAVENOUS at 09:59

## 2021-03-11 RX ADMIN — MEROPENEM 1000 MG: 1 INJECTION, POWDER, FOR SOLUTION INTRAVENOUS at 02:14

## 2021-03-11 RX ADMIN — SENNOSIDES AND DOCUSATE SODIUM 1 TABLET: 8.6; 5 TABLET ORAL at 22:19

## 2021-03-11 RX ADMIN — VASOPRESSIN 2.4 UNITS/HR: 20 INJECTION INTRAVENOUS at 03:42

## 2021-03-11 RX ADMIN — ALLOPURINOL 300 MG: 300 TABLET ORAL at 20:21

## 2021-03-11 RX ADMIN — MEROPENEM 1000 MG: 1 INJECTION, POWDER, FOR SOLUTION INTRAVENOUS at 09:58

## 2021-03-11 RX ADMIN — METOPROLOL TARTRATE 2.5 MG: 1 INJECTION, SOLUTION INTRAVENOUS at 16:55

## 2021-03-11 RX ADMIN — ACETAMINOPHEN 650 MG: 325 TABLET, FILM COATED ORAL at 09:03

## 2021-03-11 RX ADMIN — ACETAMINOPHEN 650 MG: 325 TABLET, FILM COATED ORAL at 22:19

## 2021-03-11 RX ADMIN — ACETAMINOPHEN 650 MG: 325 TABLET, FILM COATED ORAL at 16:55

## 2021-03-11 RX ADMIN — ACETAMINOPHEN 650 MG: 325 TABLET, FILM COATED ORAL at 02:14

## 2021-03-11 RX ADMIN — METOPROLOL TARTRATE 2.5 MG: 1 INJECTION, SOLUTION INTRAVENOUS at 22:20

## 2021-03-11 RX ADMIN — METOPROLOL TARTRATE 2.5 MG: 1 INJECTION, SOLUTION INTRAVENOUS at 04:18

## 2021-03-11 RX ADMIN — PAROXETINE HYDROCHLORIDE 20 MG: 20 TABLET, FILM COATED ORAL at 20:21

## 2021-03-11 ASSESSMENT — ACTIVITIES OF DAILY LIVING (ADL)
ADLS_ACUITY_SCORE: 22
ADLS_ACUITY_SCORE: 22
ADLS_ACUITY_SCORE: 24
ADLS_ACUITY_SCORE: 22
ADLS_ACUITY_SCORE: 24
ADLS_ACUITY_SCORE: 24

## 2021-03-11 ASSESSMENT — MIFFLIN-ST. JEOR: SCORE: 1935

## 2021-03-11 NOTE — CONSULTS
"CLINICAL NUTRITION SERVICES  -  ASSESSMENT NOTE      RECOMMENDATIONS FOR MD/PROVIDER TO ORDER: Recommend discontinue the D5 IVF once TF starts   Recommendations Ordered by Registered Dietitian (RD): Isosource 1.5 at 70 mL/hr to provide:  2520 kcal (28 kcal/kg), 114 g protein (1.3 g/kg), 296 g CHO, 25 g fiber, 1277 mL H2O  Flushes 60 mL every 4 hours    Malnutrition: % Weight Loss:  Weight loss does not meet criteria for malnutrition  % Intake:  Unable to determine without a nutrition history   Subcutaneous Fat Loss:  None observed  Muscle Loss:  None observed  Fluid Retention:  Mild as above     Malnutrition Diagnosis: Unable to determine due to inability to obtain a nutrition history         REASON FOR ASSESSMENT  Ron Gilmore is a 80 year old male seen by Registered Dietitian for Provider Order - Registered Dietitian to Assess and Order TF per Medical Nutrition Therapy Protocol      NUTRITION HISTORY  - Unable to obtain nutrition history as patient resting in ICU with oxymask in place.       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  IBW: 80.9 kg   % IBW: 145%  Weight History:   Wt Readings from Last 10 Encounters:   03/11/21 117.7 kg (259 lb 7.7 oz)   02/08/21 122.5 kg (270 lb)   03/12/20 119.6 kg (263 lb 11.2 oz)   03/10/20 118.1 kg (260 lb 6.4 oz)   03/09/20 117.9 kg (260 lb)   03/06/20 117.5 kg (259 lb)   03/02/20 119.3 kg (263 lb)   02/19/20 121.3 kg (267 lb 6.4 oz)   02/12/20 119.8 kg (264 lb 3.2 oz)   02/07/20 120.1 kg (264 lb 12.8 oz)     Appears that weight is " down 4# over the last year   Weight from ~ 1 month ago appears to be reported and therefore not taken into account     LABS  Labs reviewed    MEDICATIONS  IVF D5 at 75 mL/hr= 90 g CHO, 306 kcal       ASSESSED NUTRITION NEEDS PER APPROVED PRACTICE GUIDELINES:    Dosing Weight 90 kg (adjusted)  Estimated Energy Needs: 4619-4076 kcals (25-30 Kcal/Kg)  Justification: obese  Estimated Protein Needs: 110-135 grams protein (1.2-1.5 g pro/Kg)  Justification: hypercatabolism with acute illness  Estimated Fluid Needs: 5533-3624 mL (1 mL/Kcal)  Justification: maintenance    MALNUTRITION:  % Weight Loss:  Weight loss does not meet criteria for malnutrition  % Intake:  Unable to determine without a nutrition history   Subcutaneous Fat Loss:  None observed  Muscle Loss:  None observed  Fluid Retention:  Mild as above     Malnutrition Diagnosis: Unable to determine due to inability to obtain a nutrition history     NUTRITION DIAGNOSIS:  Inadequate protein-energy intake related to NPO with plans to start TF today as evidenced by meeting 0% protein and 11% energy needs from D5 IVF       NUTRITION INTERVENTIONS  Recommendations / Nutrition Prescription  Isosource 1.5 at 70 mL/hr to provide:  2520 kcal (28 kcal/kg), 114 g protein (1.3 g/kg), 296 g CHO, 25 g fiber, 1277 mL H2O  Flushes 60 mL every 4 hours     Total with D5 IVF = 2825 kcal (31 kcal/kg and 105% needs)  Recommend discontinue the D5 IVF once TF starts     Implementation  Nutrition education: Not appropriate at this time due to patient condition  EN Composition, EN Schedule, Feeding Tube Flush:  Orders written to begin TF at 10 mL/hr and increase every 8 hours by 20 mL to goal.  Flushes as above.  Collaboration and Referral of Nutrition care:  Patient discussed today during interdisciplinary bedside rounds.     Nutrition Goals  Isosource 1.5 at 70 mL/hr will meet % needs     MONITORING AND EVALUATION:  Progress towards goals will be monitored and evaluated per  protocol and Practice Guidelines    Nancy Reis RD, LD, CNSC   Clinical Dietitian - [facility]

## 2021-03-11 NOTE — PROGRESS NOTES
Patient was seen and assessed by RT, completed Areobika 5 rounds on a resistance of 1. Patient is currently on 12 L/min oxy mask.   Continue to treat and assess patient as needed.   Danielle Martinez, RT

## 2021-03-11 NOTE — PROGRESS NOTES
Attempted to call listed POC to ensure family is updated regarding patients status this evening.  Missed daughter at  hospital today.  Yuli Mando (spouse)  Both home & mobile numbers called with no answer.

## 2021-03-11 NOTE — PROGRESS NOTES
UROLOGY     Called to eval patient for scrotal skin changes. Non tender on exam. Areas of erythema and bruising on scrotum, no induration or palpable lesions concerning for abscess or infection, no drainage. WOCN also evaluating. Would hold off on imaging for now, can obtain scrotal US if further concern. Cardona and PCN with clear urine output. Will reeval tomorrow AM.     Carly Sofia PA-C  [facility]   [email or web address]  Text Page (7:30am to 4:30pm)

## 2021-03-11 NOTE — PROGRESS NOTES
Critical Care Progress Note      03/11/2021    Name: Ron Gilmore MRN#: [id number]   Age: 80 year old YOB: 1940     Hsptl Day# 2  ICU DAY #    MV DAY #           Problem List:   Principal Problem:    Severe sepsis (H)  Active Problems:    Lactic acidosis    Febrile illness    Urinary tract infection associated with indwelling urethral catheter, initial encounter (H)    Obstructive right sided ureteral stone resulting in hydronephrosis           Summary/Hospital Course:     Mr. Ron Gilmore is a 80 year old male with a past medical history of renal lithiasis, BPH, urinary retension with chronic indwelling Cardona, CVA with residual left-sided weakness, and COPD admitted from the Emergency Department on 3/9/2021 urosepsis 2/2 ureterolithiasis. Nephrostomy tube placement by IR on 3/9, subsequently became hemodynamically unstable, requiring central venous and arterial access with vasopressors and BiPAP. Found to have pan-sensitive E. coli bacteriuria and bacteremia, on ceftriaxone.      Assessment and plan :     Ron Gilmore IS a 80 year old male admitted on 3/9/2021 for sepsis secondary to pyleonephritis 2/2 ureterolithiasis now s/p right nephrostomy tube placement by IR.    I have reviewed the daily labs, imaging studies, cultures and discussed the case with referring physician and consulting physicians.    My assessment and plan by system for this patient is as follows:     Neurology/Psychiatry:   Awake and alert. Hx of CVA with residual L sided weakness.    Plan  - acetaminophen 650 q4h scheduled  - melatonin 3 mg PO QHS     Cardiovascular:   CHADSVASC Score: 6 (9.7% annual stroke risk). No ischemic changes on ECG, no significant changes compared to prior on 1/20/20. Hemodynamically labile, requiring pressor support. Wean pressors, keeping MAP <65  1. Hemodynamic instability  2. Afib w/RVR; elevated HR overnight to 140s, received metoprolol.  3. Hypertension  4. Hyperlipidemia    Plan  1. Continue  Pressor support; wean as sergio. (add vaso to help wean NE)  2. If tachycardia continues, then consider amiodarone 150 followed by gtt  3. PTA Atorvastatin  4. HOLD Aspirin     Pulmonary/Ventilator Management:  On 2 L chronic home oxygen. Patient has a DNR/DNI. Removed BiPAP this morning that showed resolved hypercapnia. Will continue on trial of OxyMask. If requiring more aggressive respiratory support (biPAP), consider further discussion RE goals of care, given the patient's clear wishes to be DNR/DNI. Transitioning back to biPAP may signify BIPAP dependence and may signal a bridge to intubation. O/w add flutter valve to encourage movement of mucus.  1. Hypercapnia, moderate, likely 2/2 somnolence  2. COPD  3. BRAD    Plan  - ABG showing mild increase in hypercapnia  - Add flutter valve to encourage coughing  - OxyMask 10 L/min wean as tolerated  - DuoNeb q6h PRN     GI and Nutrition:   1. Dysphagia/Aspiration Risk  2. Nausea  Plan  - Diet: Per SLP - NPO with spoons of honey thick liquids for comfort.  - Will place NJ tube & nutrition consult for TF's.   - Zofran PRN for nausea  - Senna BID     Renal/Fluids/Electrolytes:   1. Scrotal pain/skin changes, ischemic more likely given pattern of skin changes and less likely infectious (e.g. saulo) given that it's focal, not global and is not warm to touch and has no subQ emphysema. Less likely hematoma as it is soft, less likely edema as well. Paged Urology who will send their PA to bedside to evaluate.  2. POD #3 IR percutaneous nephrostomy tube, right. Draining appropirately.  3. Lactic acidosis, resolved  4. Making adequate urine.  5. Gout    Plan:  - Await Urology recs RE: scrotal changes  - DISCONTINUE Serial lactates q6h  - DISCONTINUE D5 0.45% NS @ 75mL/hr  - Follow CBC, BMP  - PTA Allopurinol  - Monitor function and electrolytes as needed with replacement per ICU protocols.  - Generally avoid nephrotoxic agents such as NSAID, IV contrast unless specifically  required  - Adjust medications as needed for renal clearance   - Follow I/O's as appropriate.     Infectious Disease:  1. Urosepsis 2/2 ureterolithiasis  2. Hydronephrosis  3. E. Coli bacteriuria  4. E. Coli bacteremia, pan-sensitive  4. Hx of VRE UTI (2018)  5. Hx of kidney stones    Plan  - MODIFY: Narrow to Ceftriaxone 2g q24h from Meropenem     Endocrine:   1. Difficulty in weaning off NE, consider CIRCI as a cause, no steroids indicated at this time  2. Monitor for stress induced hyperglycemia.    Plan  - Cortisol level  - ICU insulin protocol, goal sugar <180     Hematology/Oncology:   1. Hgb 11.6  2. Leukocytosis to 25, reflecting infection, stress, and critical illness. Up from 3.6 on 3/9  3. Thrombocytopenia to 40. 4Ts Score: <5% risk of HIT, no recent exposure to heparin. Likely consumptive, 2/2 to sepsis and microvascular thrombosis, now the third day of downtrending plts. Fibrinogen is wnl.    Plan   - HOLD Heparin  - Trend Hgb  - Trend WBCs  - Transfuse for <10 or if a procedure is needed    IV/Access:   1. Venous access - R internal jugular CVC, PIV's x 2  2. Arterial access -  Left Femoral Arterial Line.     ICU Prophylaxis:   1. DVT: Heparin subcutaneous - HOLD  2. Stress Ulcer: PPI/H2 blocker  3. Restraints: Not indicated  4. Wound care  - Not indicated  5. Feeding - SLP recommending teaspoons of honey-thick liquids for comfort only  6. Family Update: Pending  7. Disposition - ICU with possible transfer tomorrow to the floor    Key goals for next 24 hours:   1. Resolution of sepsis  2. Wean vasopressors, evaluate for CIRCI  3. If possible, avoid BiPAP and maintain on OxyMask, monitor blood gases.         Interim History:     3/11/21: Three days of down trending platelets (196 -> 88 -> 40). Low 4Ts Score (<5% risk of HIT). Aspirin and heparin held. Received metoprolol for tachycardia to 140s overnight. Today with scrotal changes above to be evaluated by Urology.         Key Medications:        acetaminophen  650 mg Oral Q6H     allopurinol  300 mg Oral QPM     atorvastatin  10 mg Oral QPM     [Held by provider] heparin ANTICOAGULANT  5,000 Units Subcutaneous Q8H     meropenem  1,000 mg Intravenous Q8H     metoprolol  2.5 mg Intravenous Q6H     [Held by provider] metoprolol tartrate  25 mg Oral BID     PARoxetine  20 mg Oral QPM     senna-docusate  1 tablet Oral At Bedtime     sodium chloride (PF)  10 mL Irrigation Q24H       dextrose 5% and 0.45% NaCl 75 mL/hr at 03/10/21 0935     norepinephrine 0.03 mcg/kg/min (03/11/21 0755)     - MEDICATION INSTRUCTIONS -       - MEDICATION INSTRUCTIONS -       vasopressin 2.4 Units/hr (03/11/21 0342)             Physical Examination:   Temp:  [97.3  F (36.3  C)-100.2  F (37.9  C)] 97.7  F (36.5  C)  Pulse:  [] 90  Resp:  [11-29] 12  BP: ()/(46-61) 78/46  MAP:  [60 mmHg-154 mmHg] 98 mmHg  Arterial Line BP: ()/() 154/66  SpO2:  [55 %-100 %] 97 %    Intake/Output Summary (Last 24 hours) at 3/10/2021 0721  Last data filed at 3/10/2021 0600  Gross per 24 hour   Intake 2896.28 ml   Output 1125 ml   Net 1771.28 ml     Wt Readings from Last 4 Encounters:   03/11/21 117.7 kg (259 lb 7.7 oz)   02/08/21 122.5 kg (270 lb)   03/12/20 119.6 kg (263 lb 11.2 oz)   03/10/20 118.1 kg (260 lb 6.4 oz)     Arterial Line BP: ()/() 154/66  MAP:  [60 mmHg-154 mmHg] 98 mmHg  BP - Mean:  [71-78] 71  FiO2 (%): 50 %  Resp: 12    Recent Labs   Lab 03/10/21  0740 03/09/21  1415   PH 7.22*  --    PCO2 45  --    PO2 184*  --    HCO3 18*  --    O2PER 15 L Rikki        GEN: no acute distress,sleeping.  HEENT: head ncat, sclera anicteric, OP patent, trachea midline   PULM: unlabored, clear anteriorly. Some upper airway gurgling.  CV/COR: Regular rate, regular rhythm. S1S2 no gallop,  No rub, no murmur.  ABD: soft nontender, hypoactive bowel sounds, no mass  MSK/EXT: L sided weakness and limited movement.    NEURO: Moves head freely. Less conversive compared to  yesterday.  SKIN: See skin changes in wound care note. Scrotum with 2-3 areas of blackened skin with surrounding erythema, not diffuse. No edema, firmness, or subQ emphysema. No warmth. Tender to touch in areas of erythema.  LINES: clean, dry intact         Data:   All data and imaging reviewed     ROUTINE ICU LABS (Last four results)  CMP  Recent Labs   Lab 03/11/21  0628 03/10/21  0621 03/09/21  1415 03/09/21  1146 03/09/21  0346    143 144  --  136   POTASSIUM 4.0 4.1 4.5 4.4 4.6   CHLORIDE 113* 113* 113*  --  102   CO2 24 17* 23  --  30   ANIONGAP 7 13 8  --  4   * 126* 72  --  145*   BUN 26 26 20  --  18   CR 0.95 1.24 1.05  --  0.98   GFRESTIMATED 76 55* 67  --  74   GFRESTBLACK 89 64 78  --  85   RAJ 7.8* 7.4* 7.9*  --  8.1*   MAG 2.1 1.7  --  1.9  --    PHOS 3.3 4.5  --   --   --    PROTTOTAL  --  6.4*  --   --  7.5   ALBUMIN  --  1.7*  --   --  2.3*   BILITOTAL  --  1.2  --   --  1.1   ALKPHOS  --  67  --   --  68   AST  --  51*  --   --  14   ALT  --  25  --   --  13     CBC  Recent Labs   Lab 03/11/21  0628 03/10/21  0621 03/09/21  2141 03/09/21  1415 03/09/21  0346   WBC 25.0* 30.6*  --  3.6* 9.7   RBC 4.22* 4.45  --  4.76 4.82   HGB 11.6* 12.4* 11.8* 13.3 13.4   HCT 38.6* 41.7  --  46.1 45.2   MCV 92 94  --  97 94   MCH 27.5 27.9  --  27.9 27.8   MCHC 30.1* 29.7*  --  28.9* 29.6*   RDW 15.9* 15.7*  --  15.5* 15.1*   PLT 40* 88*  --  125* 196     INR  Recent Labs   Lab 03/10/21  1420   INR 1.58*     Arterial Blood Gas  Recent Labs   Lab 03/10/21  0740 03/09/21  1415   PH 7.22*  --    PCO2 45  --    PO2 184*  --    HCO3 18*  --    O2PER 15 L Rikki        All cultures:  Recent Labs   Lab 03/09/21  1023 03/09/21  0416 03/09/21  0346   CULT >100,000 colonies/mL  Escherichia coli  * Cultured on the 1st day of incubation:  Escherichia coli  Susceptibility testing done on previous specimen  *  Critical Value/Significant Value, preliminary result only, called to and read back by   Levi Meza RN  @2124 3.9.21 LM    >100,000 colonies/mL  Escherichia coli  Susceptibility testing in progress  * Cultured on the 1st day of incubation:  Escherichia coli  *  Critical Value/Significant Value, preliminary result only, called to and read back by  Suzy Rizzo RN at 1739 3.9.21 KZ    (Note)  POSITIVE for E.COLI by Verigene multiplex nucleic acid test. Final  identification and antimicrobial susceptibility testing will be  verified by standard methods. Verigene test will not distinguish  E.coli from Shigella species including S.dysenteriae, S.flexneri,  S.boydii, and S.sonnei. Specimens containing Shigella species or  E.coli will be reported as Positive for E.coli.    Specimen tested with Verigene multiplex, gram-negative blood culture  nucleic acid test for the following targets: Acinetobacter sp.,  Citrobacter sp., Enterobacter sp., Proteus sp., E. coli, K.  pneumoniae/oxytoca, P. aeruginosa, and the following resistance  markers: CTXM, KPC, NDM, VIM, IMP and OXA.    Critical Value/Significant Value called to and read back by Levi Castañeda RN at 1954 on 3.9.21 CW         Recent Results (from the past 24 hour(s))   XR Abdomen Port 1 View    Narrative    ABDOMEN ONE VIEW PORTABLE  3/10/2021 7:08 PM     HISTORY: NJ tube placement.    COMPARISON: None.      Impression    IMPRESSION: An enteric tube is likely coiled in the stomach, with tip  projected over the fundal region. Visualized bowel gas pattern is  otherwise within normal limits. A pigtail drain is projected over the  right midabdomen. Prior cholecystectomy.    BRUNO PAYAN MD       Billing: This patient is critically ill: Yes. Total critical care time today 45 min.    I, Delvin Newell, MS4 am serving as a scribe in the creation of this medical document on behalf of Juli Blevins MD.

## 2021-03-11 NOTE — CONSULTS
"[facility] Nurse Inpatient Wound Assessment   Reason for consultation: Evaluate and treat scrotal bruising and intact blisters    Assessment  Scrotal wounds due to unknown etiology discussed concerns for blood flow, possible clots with Medical student and Hospitalist   Status: initial assessment and evolving  Bilateral hands and fingers cold and with gray coloration. On vasopressors, had procedure in IR for stent placement on right flank   Treatment Plan  Scrotal wounds: Every shift   Monitor for worsening s/s or draining, darkening coloration spreading, edema, redness  Contact WO team for evolution or worsening     Sacrum:  1. Clean wound with saline or MicroKlenz Spray, pat dry  2. Wipe / \"clean\" the surrounding periwound tissue with several skin preps to help with dressing sticking  3. Press a Mepilex  Sacral Dressing (PS#[id number])  to the area, making sure to conform nicely to skin curvatures. Make sure not to trap skin under foam dressing.  4. Time and date dressing change  -REPOSITION ALL PATIENTS SIDE TO SIDE ONLY WHEN IN BED, EVERY 2 HOURS,   -HEELS MUST BE KEPT ELEVATED AT ALL TIMES, USING AT LEAST 2 PILLOWS UNDER EACH CALF, ASSURING HEELS ARE FLOATING  -WHEN UP TO THE CHAIR PT NEEDS TO FULLY OFF LOAD EVERY 2 HOURS    Skin care plan to perineal/ coccyx: TID and prn  1. Clean with Koki Cleanse and Protect, wipe dry  2. Dust with baby powder  - keep pt positioned side to side only, when in bed, repositioning every 2 hours  - keep heels elevated at all times, at least one pillow under each leg from knees to heels, assuring heels are floating  - when up the chair pt should fully offload every 2 hours and be encouraged to shift weight every 15 minutes     Orders Written  Recommended provider order: concerned that this is not pressure on his scrotum and that this looks like hypoxia of the pelvis or from the vasopressors?   WO Nurse follow-up plan:weekly  Nursing to notify the Provider(s) and re-consult the [facility] Nurse if " wound(s) deteriorates or new skin concern.    Patient History  According to provider note(s): Mr. Ron Gilmore is a 80 year old male with a past medical history of renal lithiasis, BPH, urinary retension with chronic indwelling Cardona, CVA with residual left-sided weakness, and COPD admitted from the Emergency Department on 3/9/2021 with sepsis secondary to pyelonephritis 2/2 ureterolithiasis on CT abdomen now s/p nephrostomy tube placement by IR the same day.       Objective Data  Containment of urine/stool: Incontinence Protocol and Incontinent pad in bed    Active Diet Order  Orders Placed This Encounter      NPO per Anesthesia Guidelines for Procedure/Surgery Except for: Meds, Other; Specify: Single tsps of honey thick/puree with direct 1:1 supervision, cues to swallow, sit at 90 degrees, ONLY WHEN STABLE RESPIRATORY STATUS      Output:   I/O last 3 completed shifts:  In: 2193.95 [I.V.:2193.95]  Out: 2255 [Urine:2255]    Risk Assessment:   Sensory Perception: 3-->slightly limited  Moisture: 3-->occasionally moist  Activity: 1-->bedfast  Mobility: 2-->very limited  Nutrition: 2-->probably inadequate  Friction and Shear: 1-->problem  Lon Score: 12                          Labs:   Recent Labs   Lab 03/11/21  0628 03/10/21  1420 03/10/21  0621   ALBUMIN  --   --  1.7*   HGB 11.6*  --  12.4*   INR  --  1.58*  --    WBC 25.0*  --  30.6*       Physical Exam  Areas of skin assessed: focused scrotum, coccyx, bilateral fingers and thumbs        Wound Location:  SCROTUM  Date of last photo today  Wound History: new onset as of last night, had an IR procedure, concern for vascular occlusive disease in pelvis vs pressure injury from possible proning no evidence of facial or penile bruising to support pressure at this time   Wound Base: 100 % maroon, purple, blister and intact      Palpation of the wound bed: normal      Drainage: none     Description of drainage: none     Measurements (length x width x depth, in cm) 11.0   x 10.0  x  0.0 cm      Periwound skin: intact, ecchymosis and nonblanchable redness       Color: red      Temperature: normal   Odor: none  Pain: mild, tender            Wound Location:  LEFT BUTTTOCK  Date of last photo 2/9  Wound History: venous congestion noted from chronic pressure, history of pressure injury on left IT.   Wound Base: 100% intact U shaped ecchymosis      Palpation of the wound bed: normal      Drainage: none     Description of drainage: none     LEFT BUTTOCK Measurements (length x width x depth, in cm) 3.0  x 2.0  x  0.0 cm      RIGHT BUTTOCK Measurements (length x width x depth, in cm) 0.5 x 0.3  x  0.0 cm     Periwound skin: venous congestion      Color: brown, dark red chronic discoloratratrion      Temperature: normal   Odor: none  Pain: absent, none    PICTURE OF FINGERS ON LEFT HANDS                LEFT THUMB ABOVE      RIGHT THUMB     Interventions  Visual inspection and assessment completed hands are new due to vasopressors sacrum on admission   Wound Care Rationale Provide protection   Wound Care: done per plan of care  Supplies: placed at the bedside, at bedside, not necessary, floor stock, discussed with RN and ICU provider   Current off-loading measures: Pillows under calves and Wedge positioning system  Current support surface: Bariatric Airloss with pulsation  Education provided to: importance of repositioning, plan of care and Off-loading pressure  Discussed plan of care with Family, Nurse and Physician    Sharri Badillo RN BS CWON

## 2021-03-11 NOTE — PROGRESS NOTES
Notified by bedside RN about scrotal changes:    Scrotum has several dark areas in non-dependent regions of the scrotum with surrounding tender erythema. Scrotum itself is soft and is not swollen. No global tenderness or infectious signs. No subcutaneous emphysema, no warmth. See Wound Care note for images.    Paged and spoke to Rufina BRITO of Urology. Her or another PA from their service will come to bedside to evaluate the patient and offer their recs.    Delvin Newell, MS4  Intensive Care

## 2021-03-11 NOTE — PLAN OF CARE
End of Shift Nursing Summary    Neuro:  alert and orientated to person, place and time. When waking up he sometimes needs to be reminded that he is in the hospital. Follows all commands, has left sided weakness and rt eye droop  Pain: complained of pain in his back.  CV:  Blood pressure stable with levo and vaso drips on.  Pulm: clear but diminished throughout. Oxy mask on 15L  GI/: chirinos with good urine output.  Fever: no  fever throughout the night.

## 2021-03-11 NOTE — PROGRESS NOTES
Patient arrived in ICU at 0330. Alert and orientated. Stable blood pressure with vaso running at 1.2. increased heart rate. Does not show any signs of bleeding at this time. Provider in to see patient explain plan of care.

## 2021-03-11 NOTE — PROGRESS NOTES
Metoprolol 2.5 mg IV Q6H ordered for A fib with Rates in 140s-150s  Thuy Vázquez MD 3/10/2021 10:27 PM

## 2021-03-11 NOTE — PLAN OF CARE
SLP: Attempted to see patient for swallow treatment but was with other medical staff and nurse stated will be seeing MD shortly.

## 2021-03-11 NOTE — PROGRESS NOTES
ICU Tele Cross Cover    Notified of platelet count of 88 and asked whether or not to continue heparin.  - Baseline platelet count approximately 170, decreased to 88 now. Hemoglobin stable at 12.4. Calculated 4T score given concern for potential HIT. 4T score was 1 (for 30-50% platelet drop) indicating low probability of HIT (< 5%). Also concern for bleeding, however no bloody output from nephrostomy tube and hemoglobin stable making this less likely. Given low likelihood HIT and bleeding with increased risk for thrombosis will plan to continue heparin, monitor for bleeding.    Cnonor Orlando, MS4    Agree with above note. Continue heparin, trend platelets    Thuy Vázquez MD 3/10/2021 11:37 PM

## 2021-03-11 NOTE — PROGRESS NOTES
"Interventional Radiology Progress Note:  Inpatient at [facility]  Date: 2021   Patient name: Ron Gilmore  MRN:[id number]  :  1940    History: Ron Gilmore is a 80 year old male with a history significant for CVA with left sided weakness, BPH with outlet obstruction, TURP in 2018; with chronic chirinos per urology and nephrolithiasis. He was admitted 3/9 with fevers, chills,  Hypoxemia and was found to be bacteremic upon further testing. CT showed a \"Distal right ureter stone at the pelvic inlet. Two right ureterovesical junction stones just in the bladder lumen. Associated moderate right hydronephrosis and Multiple stones within the bladder and within the right kidney.\"      A right nephrostomy tube was requested for decompression/drainage which was placed on 3/9. Initial urine sampled in IR appeared purulent and grew out gram negative rods. He was transferred to ICU and had issues overnight with septicemia- rising lactate acid level, leukocytosis, febrile, requiring 2 drips to support his BP.      He is being seen in IR follow up today.     Interval History: weaned off vasopressin drip, and nor epi being weaned down, Afebrile overnight, Clear urine with good output from right nephrostomy tube, Wbc down to 25 from 30 but still elevated. Renal function improved    Physical Exam:   Vitals: Temp:  [97.3  F (36.3  C)-100.2  F (37.9  C)] 97.5  F (36.4  C)  Pulse:  [] 84  Resp:  [10-29] 10  BP: ()/(46-79) 133/69  MAP:  [60 mmHg-154 mmHg] 79 mmHg  Arterial Line BP: ()/() 112/55  SpO2:  [55 %-100 %] 98 %    General: Ill appearing male In no acute distress lying in bed with oxygen mask in place.   Neuro: Not evaluated, patient sleeping when in room.    Right nephrostomy tube:   -Dressing not evaluated, patient lying on his back.    -Tube is draining clear serous urine    Output   3/9 130 mL; 3/10 565 mL; 3/11 755 so far today     Labs:  ROUTINE ICU LABS (Last four " results)  CMP  Recent Labs   Lab 03/11/21  0628 03/10/21  0621 03/09/21  1415 03/09/21  1146 03/09/21  0346    143 144  --  136   POTASSIUM 4.0 4.1 4.5 4.4 4.6   CHLORIDE 113* 113* 113*  --  102   CO2 24 17* 23  --  30   ANIONGAP 7 13 8  --  4   * 126* 72  --  145*   BUN 26 26 20  --  18   CR 0.95 1.24 1.05  --  0.98   GFRESTIMATED 76 55* 67  --  74   GFRESTBLACK 89 64 78  --  85   RAJ 7.8* 7.4* 7.9*  --  8.1*   MAG 2.1 1.7  --  1.9  --    PHOS 3.3 4.5  --   --   --    PROTTOTAL  --  6.4*  --   --  7.5   ALBUMIN  --  1.7*  --   --  2.3*   BILITOTAL  --  1.2  --   --  1.1   ALKPHOS  --  67  --   --  68   AST  --  51*  --   --  14   ALT  --  25  --   --  13     CBC  Recent Labs   Lab 03/11/21  0628 03/10/21  0621 03/09/21  2141 03/09/21  1415 03/09/21  0346   WBC 25.0* 30.6*  --  3.6* 9.7   RBC 4.22* 4.45  --  4.76 4.82   HGB 11.6* 12.4* 11.8* 13.3 13.4   HCT 38.6* 41.7  --  46.1 45.2   MCV 92 94  --  97 94   MCH 27.5 27.9  --  27.9 27.8   MCHC 30.1* 29.7*  --  28.9* 29.6*   RDW 15.9* 15.7*  --  15.5* 15.1*   PLT 40* 88*  --  125* 196     INR  Recent Labs   Lab 03/10/21  1420   INR 1.58*     Arterial Blood Gas  Recent Labs   Lab 03/10/21  0740 03/09/21  1415   PH 7.22*  --    PCO2 45  --    PO2 184*  --    HCO3 18*  --    O2PER 15 L Rikki          Cultures:  Recent Labs   Lab 03/09/21  1023 03/09/21  0416 03/09/21  0346   CULT >100,000 colonies/mL  Escherichia coli  * Cultured on the 1st day of incubation:  Escherichia coli  Susceptibility testing done on previous specimen  *  Critical Value/Significant Value, preliminary result only, called to and read back by   Levi Meza RN @2225 3.9.21 LM    >100,000 colonies/mL  Escherichia coli  Susceptibility testing in progress  * Cultured on the 1st day of incubation:  Escherichia coli  *  Critical Value/Significant Value, preliminary result only, called to and read back by  Suzy Rizzo RN at 4149 3.9.21 KZ    (Note)  POSITIVE for E.COLI by [employer] multiplex  nucleic acid test. Final  identification and antimicrobial susceptibility testing will be  verified by standard methods. Verigene test will not distinguish  E.coli from Shigella species including S.dysenteriae, S.flexneri,  S.boydii, and S.sonnei. Specimens containing Shigella species or  E.coli will be reported as Positive for E.coli.    Specimen tested with Verigene multiplex, gram-negative blood culture  nucleic acid test for the following targets: Acinetobacter sp.,  Citrobacter sp., Enterobacter sp., Proteus sp., E. coli, K.  pneumoniae/oxytoca, P. aeruginosa, and the following resistance  markers: CTXM, KPC, NDM, VIM, IMP and OXA.    Critical Value/Significant Value called to and read back by Levi Castañeda RN at 1954 on 3.9.21 CW           Assessment: Vital signs and lab values starting to normalize, Urine output from nephrostomy tube has increased after flushing out sediment yesterday and appears to be clear today.     Plan: Continue current cares, monitor output, change dressing every 2-3 days,     10 minutes were spent with patient during today's visit with greater than 50% of the time spent face to face with the patient, in reviewing medical record and images and in counseling and coordinating patient's care.    Thanks [facility] Interventional Radiology CNP ([phone]) (phone [phone])

## 2021-03-11 NOTE — PROGRESS NOTES
UROLOGY PROGRESS NOTE    March 11, 2021    SUBJECTIVE:  Remains in ICU.  Sleeping soundly during rounds this AM.  Still requiring 12-15L via oxymask.  Some back pain per nursing notes.  Good UOP and no hematuria from PCN.    Cr 1.24 -> 0.95  WBC 30.6 -> 25  Plt 88 -> 40    OBJECTIVE:  Temp:  [97.3  F (36.3  C)-100.2  F (37.9  C)] 97.5  F (36.4  C)  Pulse:  [] 91  Resp:  [11-29] 13  BP: ()/(46-79) 133/69  MAP:  [60 mmHg-154 mmHg] 94 mmHg  Arterial Line BP: ()/() 133/66  SpO2:  [55 %-100 %] 99 %    Intake/Output Summary (Last 24 hours) at 3/11/2021 0841  Last data filed at 3/11/2021 0800  Gross per 24 hour   Intake 2168.51 ml   Output 2297 ml   Net -128.49 ml       GENERAL:  Asleep, oxymask in place  HEAD: Normocephalic atraumatic  SCLERA: Anicteric  EXTREMITIES: Warm and well perfused  RESP: Oxymask in place  : Cardona with allan output, right PCN with light straw colored output, no hematuria from either urinary drain    LABS:  Lab Results   Component Value Date    WBC 25.0 03/11/2021     Lab Results   Component Value Date    HGB 11.6 03/11/2021     Lab Results   Component Value Date    HCT 38.6 03/11/2021     Lab Results   Component Value Date    PLT 40 03/11/2021     Last Basic Metabolic Panel:  Lab Results   Component Value Date     03/11/2021      Lab Results   Component Value Date    POTASSIUM 4.0 03/11/2021     Lab Results   Component Value Date    CHLORIDE 113 03/11/2021     Lab Results   Component Value Date    RAJ 7.8 03/11/2021     Lab Results   Component Value Date    CO2 24 03/11/2021     Lab Results   Component Value Date    BUN 26 03/11/2021     Lab Results   Component Value Date    CR 0.95 03/11/2021     Lab Results   Component Value Date     03/11/2021       ASSESSMENT/PLAN: 80 y.o man with distal right ureteral stone, urosepsis.     1. Distal right ureteral stone  - S/p right PCN on 3/9 with IR  - Continue right PCN  - Continue Cardona for now  - No hematuria from  Cardona or PCN  - Likely eventual antegrade stent placement prior to discharge once patient more stable then definitive stone management with ureteroscopy, lithotripsy 2-3 weeks once infection adequately treated     2. Urosepsis  - UCx and BCx with E coli  - Abx per IM/ICU team (currently on meropenem), sensitivities still pending as of this morning 3/11      Addm: Paged regarding new findings of scrotal skin changes this afternoon and discussed via phone with Dr. Newell, also reviewed photos of scrotal skin in [facility] note with Dr. Barr.  Carly Sofia PA-C or I will be by to evaluate later today.      Rufina Neville PA-C  [facility]  Office Phone: [phone]  After 4pm and on weekends, please call [phone]

## 2021-03-12 ENCOUNTER — APPOINTMENT (OUTPATIENT)
Dept: SPEECH THERAPY | Facility: CLINIC | Age: 81
DRG: 871 | End: 2021-03-12
Payer: COMMERCIAL

## 2021-03-12 LAB
ANION GAP SERPL CALCULATED.3IONS-SCNC: 3 MMOL/L (ref 3–14)
ANISOCYTOSIS BLD QL SMEAR: SLIGHT
APTT PPP: 39 SEC (ref 22–37)
APTT PPP: 77 SEC (ref 22–37)
BASOPHILS # BLD AUTO: 0 10E9/L (ref 0–0.2)
BASOPHILS NFR BLD AUTO: 0 %
BUN SERPL-MCNC: 29 MG/DL (ref 7–30)
CALCIUM SERPL-MCNC: 7.9 MG/DL (ref 8.5–10.1)
CHLORIDE SERPL-SCNC: 113 MMOL/L (ref 94–109)
CO2 SERPL-SCNC: 29 MMOL/L (ref 20–32)
CREAT SERPL-MCNC: 0.81 MG/DL (ref 0.66–1.25)
DIFFERENTIAL METHOD BLD: ABNORMAL
EOSINOPHIL # BLD AUTO: 0 10E9/L (ref 0–0.7)
EOSINOPHIL NFR BLD AUTO: 0 %
ERYTHROCYTE [DISTWIDTH] IN BLOOD BY AUTOMATED COUNT: 15.9 % (ref 10–15)
ERYTHROCYTE [DISTWIDTH] IN BLOOD BY AUTOMATED COUNT: 15.9 % (ref 10–15)
FERRITIN SERPL-MCNC: 130 NG/ML (ref 26–388)
GFR SERPL CREATININE-BSD FRML MDRD: 85 ML/MIN/{1.73_M2}
GLUCOSE BLDC GLUCOMTR-MCNC: 82 MG/DL (ref 70–99)
GLUCOSE BLDC GLUCOMTR-MCNC: 85 MG/DL (ref 70–99)
GLUCOSE BLDC GLUCOMTR-MCNC: 86 MG/DL (ref 70–99)
GLUCOSE SERPL-MCNC: 93 MG/DL (ref 70–99)
HCT VFR BLD AUTO: 35.5 % (ref 40–53)
HCT VFR BLD AUTO: 38.5 % (ref 40–53)
HGB BLD-MCNC: 10.7 G/DL (ref 13.3–17.7)
HGB BLD-MCNC: 11.5 G/DL (ref 13.3–17.7)
INR PPP: 1.2 (ref 0.86–1.14)
INTERPRETATION ECG - MUSE: NORMAL
IRON SATN MFR SERPL: 28 % (ref 15–46)
IRON SERPL-MCNC: 51 UG/DL (ref 35–180)
LACTATE BLD-SCNC: 1.6 MMOL/L (ref 0.7–2)
LYMPHOCYTES # BLD AUTO: 0.3 10E9/L (ref 0.8–5.3)
LYMPHOCYTES NFR BLD AUTO: 2 %
MAGNESIUM SERPL-MCNC: 2 MG/DL (ref 1.6–2.3)
MCH RBC QN AUTO: 27.5 PG (ref 26.5–33)
MCH RBC QN AUTO: 28.1 PG (ref 26.5–33)
MCHC RBC AUTO-ENTMCNC: 29.9 G/DL (ref 31.5–36.5)
MCHC RBC AUTO-ENTMCNC: 30.1 G/DL (ref 31.5–36.5)
MCV RBC AUTO: 92 FL (ref 78–100)
MCV RBC AUTO: 93 FL (ref 78–100)
METAMYELOCYTES # BLD: 0.1 10E9/L
METAMYELOCYTES NFR BLD MANUAL: 1 %
MONOCYTES # BLD AUTO: 0.3 10E9/L (ref 0–1.3)
MONOCYTES NFR BLD AUTO: 2 %
NEUTROPHILS # BLD AUTO: 13.4 10E9/L (ref 1.6–8.3)
NEUTROPHILS NFR BLD AUTO: 95 %
PLATELET # BLD AUTO: 21 10E9/L (ref 150–450)
PLATELET # BLD AUTO: 23 10E9/L (ref 150–450)
PLATELET # BLD EST: ABNORMAL 10*3/UL
POTASSIUM SERPL-SCNC: 3.3 MMOL/L (ref 3.4–5.3)
POTASSIUM SERPL-SCNC: 3.7 MMOL/L (ref 3.4–5.3)
RBC # BLD AUTO: 3.81 10E12/L (ref 4.4–5.9)
RBC # BLD AUTO: 4.18 10E12/L (ref 4.4–5.9)
RETICS # AUTO: 28.1 10E9/L (ref 25–95)
RETICS/RBC NFR AUTO: 0.7 % (ref 0.5–2)
SMUDGE CELLS BLD QL SMEAR: PRESENT
SODIUM SERPL-SCNC: 145 MMOL/L (ref 133–144)
TIBC SERPL-MCNC: 179 UG/DL (ref 240–430)
WBC # BLD AUTO: 13.9 10E9/L (ref 4–11)
WBC # BLD AUTO: 14.1 10E9/L (ref 4–11)

## 2021-03-12 PROCEDURE — 82728 ASSAY OF FERRITIN: CPT | Performed by: INTERNAL MEDICINE

## 2021-03-12 PROCEDURE — 84132 ASSAY OF SERUM POTASSIUM: CPT | Performed by: INTERNAL MEDICINE

## 2021-03-12 PROCEDURE — 250N000011 HC RX IP 250 OP 636: Performed by: INTERNAL MEDICINE

## 2021-03-12 PROCEDURE — 999N001109 HC STATISTIC MORPHOLOGY W/INTERP HISTOLOGY TC 85060: Performed by: INTERNAL MEDICINE

## 2021-03-12 PROCEDURE — 85730 THROMBOPLASTIN TIME PARTIAL: CPT | Performed by: INTERNAL MEDICINE

## 2021-03-12 PROCEDURE — 86022 PLATELET ANTIBODIES: CPT | Performed by: INTERNAL MEDICINE

## 2021-03-12 PROCEDURE — 250N000013 HC RX MED GY IP 250 OP 250 PS 637

## 2021-03-12 PROCEDURE — 999N001017 HC STATISTIC GLUCOSE BY METER IP

## 2021-03-12 PROCEDURE — 83605 ASSAY OF LACTIC ACID: CPT | Performed by: INTERNAL MEDICINE

## 2021-03-12 PROCEDURE — 250N000011 HC RX IP 250 OP 636: Performed by: OBSTETRICS & GYNECOLOGY

## 2021-03-12 PROCEDURE — 250N000013 HC RX MED GY IP 250 OP 250 PS 637: Performed by: INTERNAL MEDICINE

## 2021-03-12 PROCEDURE — 80048 BASIC METABOLIC PNL TOTAL CA: CPT | Performed by: OBSTETRICS & GYNECOLOGY

## 2021-03-12 PROCEDURE — 82607 VITAMIN B-12: CPT | Performed by: INTERNAL MEDICINE

## 2021-03-12 PROCEDURE — 85027 COMPLETE CBC AUTOMATED: CPT | Performed by: OBSTETRICS & GYNECOLOGY

## 2021-03-12 PROCEDURE — 250N000009 HC RX 250: Performed by: OBSTETRICS & GYNECOLOGY

## 2021-03-12 PROCEDURE — 120N000001 HC R&B MED SURG/OB

## 2021-03-12 PROCEDURE — 85610 PROTHROMBIN TIME: CPT | Performed by: INTERNAL MEDICINE

## 2021-03-12 PROCEDURE — 272N000078 HC NUTRITION PRODUCT INTERMEDIATE LITER

## 2021-03-12 PROCEDURE — 83540 ASSAY OF IRON: CPT | Performed by: INTERNAL MEDICINE

## 2021-03-12 PROCEDURE — 99207 PR CONSULT E&M CHANGED TO SUBSEQUENT LEVEL: CPT | Performed by: INTERNAL MEDICINE

## 2021-03-12 PROCEDURE — 99233 SBSQ HOSP IP/OBS HIGH 50: CPT | Performed by: INTERNAL MEDICINE

## 2021-03-12 PROCEDURE — 92526 ORAL FUNCTION THERAPY: CPT | Mod: GN | Performed by: SPEECH-LANGUAGE PATHOLOGIST

## 2021-03-12 PROCEDURE — 85045 AUTOMATED RETICULOCYTE COUNT: CPT | Performed by: INTERNAL MEDICINE

## 2021-03-12 PROCEDURE — 85060 BLOOD SMEAR INTERPRETATION: CPT | Mod: 26 | Performed by: PATHOLOGY

## 2021-03-12 PROCEDURE — 250N000013 HC RX MED GY IP 250 OP 250 PS 637: Performed by: OBSTETRICS & GYNECOLOGY

## 2021-03-12 PROCEDURE — 99221 1ST HOSP IP/OBS SF/LOW 40: CPT | Performed by: INTERNAL MEDICINE

## 2021-03-12 PROCEDURE — 83735 ASSAY OF MAGNESIUM: CPT | Performed by: OBSTETRICS & GYNECOLOGY

## 2021-03-12 PROCEDURE — 83550 IRON BINDING TEST: CPT | Performed by: INTERNAL MEDICINE

## 2021-03-12 PROCEDURE — 85025 COMPLETE CBC W/AUTO DIFF WBC: CPT | Performed by: INTERNAL MEDICINE

## 2021-03-12 RX ORDER — POTASSIUM CHLORIDE 20MEQ/15ML
40 LIQUID (ML) ORAL ONCE
Status: COMPLETED | OUTPATIENT
Start: 2021-03-12 | End: 2021-03-12

## 2021-03-12 RX ORDER — FUROSEMIDE 10 MG/ML
20 INJECTION INTRAMUSCULAR; INTRAVENOUS ONCE
Status: COMPLETED | OUTPATIENT
Start: 2021-03-12 | End: 2021-03-12

## 2021-03-12 RX ADMIN — ARGATROBAN 0.5 MCG/KG/MIN: 50 INJECTION INTRAVENOUS at 14:54

## 2021-03-12 RX ADMIN — ATORVASTATIN CALCIUM 10 MG: 10 TABLET, FILM COATED ORAL at 20:05

## 2021-03-12 RX ADMIN — Medication 12.5 MG: at 20:05

## 2021-03-12 RX ADMIN — CEFTRIAXONE SODIUM 2 G: 2 INJECTION, POWDER, FOR SOLUTION INTRAMUSCULAR; INTRAVENOUS at 11:33

## 2021-03-12 RX ADMIN — ACETAMINOPHEN 650 MG: 325 TABLET, FILM COATED ORAL at 04:02

## 2021-03-12 RX ADMIN — ACETAMINOPHEN 650 MG: 325 TABLET, FILM COATED ORAL at 11:06

## 2021-03-12 RX ADMIN — POTASSIUM CHLORIDE 40 MEQ: 20 SOLUTION ORAL at 07:04

## 2021-03-12 RX ADMIN — ACETAMINOPHEN 650 MG: 325 TABLET, FILM COATED ORAL at 15:39

## 2021-03-12 RX ADMIN — ALLOPURINOL 300 MG: 300 TABLET ORAL at 20:05

## 2021-03-12 RX ADMIN — METOPROLOL TARTRATE 2.5 MG: 1 INJECTION, SOLUTION INTRAVENOUS at 04:02

## 2021-03-12 RX ADMIN — PAROXETINE HYDROCHLORIDE 20 MG: 20 TABLET, FILM COATED ORAL at 20:05

## 2021-03-12 RX ADMIN — FUROSEMIDE 20 MG: 10 INJECTION, SOLUTION INTRAVENOUS at 06:17

## 2021-03-12 RX ADMIN — Medication 12.5 MG: at 11:06

## 2021-03-12 ASSESSMENT — ACTIVITIES OF DAILY LIVING (ADL)
ADLS_ACUITY_SCORE: 24
ADLS_ACUITY_SCORE: 23
ADLS_ACUITY_SCORE: 23
ADLS_ACUITY_SCORE: 24
ADLS_ACUITY_SCORE: 25
ADLS_ACUITY_SCORE: 24

## 2021-03-12 ASSESSMENT — MIFFLIN-ST. JEOR: SCORE: 1913

## 2021-03-12 NOTE — PROVIDER NOTIFICATION
MD Notification    Notified Person Name: Dr. Gurpreet MD    Notification Date/Time: 3/12 0500    Purpose of Notification: notified of critical platelet level and low UO throughout the night    Orders Received: awaiting orders, monitor

## 2021-03-12 NOTE — PROGRESS NOTES
[facility]    Urology Progress Note     Assessment & Plan   Ron Gilmore is a 80 year old male who was admitted on 3/9/2021. Urology following for distal right ureteral calculus, urosepsis; s/p right PCN placement by IR on 3/9; scrotal ecchymosis with thrombocytopenia     Plan:   1. Distal right ureteral calculus and urosepsis   -cont right PCN for now, possible antegrade stent placement with IR once out of ICU and prior to discharge   -cont culture specific abx   -chronic chirinos   -eventual cystoscopy with ureteroscopy and lithotripsy for stone removal in 2-3wks as outpatient     2. Scrotal ecchymosis  -no concerns for abscess or infection  -likely related to thrombocytopenia, plt 23 this AM   -cont to monitor     Carly Sofia PA-C  [facility]   [email or web address]  Text Page (7:30am to 3:30pm)    Interval History   More alert this AM and reports he is feeling somewhat improved  Right PCN and chirinos with clear urine outputs   Scrotal tenderness on exam but no apparent changes from yesterday     AVSS  Creat 0.81  WBC 13.9  Hb 10.7  plt 23    Physical Exam   Temp: 96.3  F (35.7  C) Temp src: Bladder BP: 110/69 Pulse: 88   Resp: 12 SpO2: 94 % O2 Device: Nasal cannula Oxygen Delivery: 4 LPM  Vitals:    03/10/21 0300 03/11/21 0043 03/12/21 0600   Weight: 118.5 kg (261 lb 3.9 oz) 117.7 kg (259 lb 7.7 oz) 115.5 kg (254 lb 10.1 oz)     Vital Signs with Ranges  Temp:  [95.9  F (35.5  C)-97.7  F (36.5  C)] 96.3  F (35.7  C)  Pulse:  [82-98] 88  Resp:  [8-14] 12  BP: ()/(47-74) 110/69  MAP:  [74 mmHg-221 mmHg] 74 mmHg  Arterial Line BP: (-)/(-) -33/-33  SpO2:  [81 %-99 %] 94 %  I/O last 3 completed shifts:  In: 1598.53 [P.O.:350; I.V.:798.53; NG/GT:330]  Out: 1278 [Urine:1278]    GENERAL:  Alert and oriented   HEAD: Normocephalic, atraumatic  SCLERA: Anicteric  EXTREMITIES: Warm and well perfused  RESP: nasal o2   : Chirinos with allan output, right  PCN with light straw colored output, no hematuria from either urinary drain; scrotal bruising and tenderness on exam, no skin thickening or erythema    Medications     dextrose       norepinephrine Stopped (03/11/21 1232)     - MEDICATION INSTRUCTIONS -       - MEDICATION INSTRUCTIONS -       vasopressin Stopped (03/11/21 1613)       acetaminophen  650 mg Oral Q6H     allopurinol  300 mg Oral QPM     atorvastatin  10 mg Oral QPM     cefTRIAXone  2 g Intravenous Q24H     [Held by provider] heparin ANTICOAGULANT  5,000 Units Subcutaneous Q8H     metoprolol  2.5 mg Intravenous Q6H     [Held by provider] metoprolol tartrate  25 mg Oral BID     PARoxetine  20 mg Oral QPM     senna-docusate  1 tablet Oral At Bedtime     sodium chloride (PF)  10 mL Irrigation Q24H       Data   Results for orders placed or performed during the hospital encounter of 03/09/21 (from the past 24 hour(s))   Lactic acid whole blood   Result Value Ref Range    Lactic Acid 1.5 0.7 - 2.0 mmol/L   Cortisol   Result Value Ref Range    Cortisol Serum 49.9 (H) 4 - 22 ug/dL   Glucose by meter   Result Value Ref Range    Glucose 112 (H) 70 - 99 mg/dL   Blood gas arterial with oxyhemoglobin   Result Value Ref Range    pH Arterial 7.28 (L) 7.35 - 7.45 pH    pCO2 Arterial 57 (H) 35 - 45 mm Hg    pO2 Arterial 130 (H) 80 - 105 mm Hg    Bicarbonate Arterial 26 21 - 28 mmol/L    FIO2 12L     Oxyhemoglobin Arterial 97 92 - 100 %    Base Deficit Art 1.1 mmol/L   Glucose by meter   Result Value Ref Range    Glucose 97 70 - 99 mg/dL   XR Abdomen Port 1 View    Narrative    ABDOMEN ONE VIEW PORTABLE  3/11/2021 6:40 PM     HISTORY: Feeding tube positioning.    COMPARISON: 3/10/2021.      Impression    IMPRESSION: Enteric tube is again coiled in the stomach, with the tip  near the fundus. Pigtail catheter is again projected over the right  midabdomen. Paucity of gas is noted in the visualized bowel. Surgical  clips RUQ.    BRUNO PAYAN MD   Lactic acid whole blood    Result Value Ref Range    Lactic Acid 1.3 0.7 - 2.0 mmol/L   Blood gas arterial with oxyhemoglobin   Result Value Ref Range    pH Arterial 7.29 (L) 7.35 - 7.45 pH    pCO2 Arterial 59 (H) 35 - 45 mm Hg    pO2 Arterial 161 (H) 80 - 105 mm Hg    Bicarbonate Arterial 29 (H) 21 - 28 mmol/L    FIO2 12L     Oxyhemoglobin Arterial 98 92 - 100 %    Base Excess Art 1.1 mmol/L   Glucose by meter   Result Value Ref Range    Glucose 85 70 - 99 mg/dL   Glucose by meter   Result Value Ref Range    Glucose 86 70 - 99 mg/dL   Basic metabolic panel   Result Value Ref Range    Sodium 145 (H) 133 - 144 mmol/L    Potassium 3.3 (L) 3.4 - 5.3 mmol/L    Chloride 113 (H) 94 - 109 mmol/L    Carbon Dioxide 29 20 - 32 mmol/L    Anion Gap 3 3 - 14 mmol/L    Glucose 93 70 - 99 mg/dL    Urea Nitrogen 29 7 - 30 mg/dL    Creatinine 0.81 0.66 - 1.25 mg/dL    GFR Estimate 85 >60 mL/min/[1.73_m2]    GFR Estimate If Black >90 >60 mL/min/[1.73_m2]    Calcium 7.9 (L) 8.5 - 10.1 mg/dL   CBC with platelets   Result Value Ref Range    WBC 13.9 (H) 4.0 - 11.0 10e9/L    RBC Count 3.81 (L) 4.4 - 5.9 10e12/L    Hemoglobin 10.7 (L) 13.3 - 17.7 g/dL    Hematocrit 35.5 (L) 40.0 - 53.0 %    MCV 93 78 - 100 fl    MCH 28.1 26.5 - 33.0 pg    MCHC 30.1 (L) 31.5 - 36.5 g/dL    RDW 15.9 (H) 10.0 - 15.0 %    Platelet Count 23 (LL) 150 - 450 10e9/L   Magnesium   Result Value Ref Range    Magnesium 2.0 1.6 - 2.3 mg/dL

## 2021-03-12 NOTE — CONSULTS
[facility]    Hematology/Oncology Consult Note      Date of Admission:  3/9/2021  Date of Consult:  03/12/21  Reason for Consult: thrombocytopenia      ASSESSMENT/PLAN:  Ron Gilmore is a 80 year old male with:    1) Thrombocytopenia: Maybe due to severe sepsis, antibiotics.  Platelet count on admission was 196K, trended down to 21K today.  He did get subcutaneous heparin before it was stopped today, so HIT Ab was sent and pending.  He was started on argatroban trip.  Fibrinogen was normal.  Creatinine is normal.  TTP or DIC are unlikely.  Peripheral smear was sent and pending.    -follow-up on HIT Ab screen  -on argabroban drip  -monitor  -if HIT is negative, and platelet count shows no improvement despite clinical improvement of his infection, may consider bone marrow biopsy  -we will continue to follow    2) Anemia: mild, likely due to his acute illness and dilutional.  Iron, ferritin, vitamin B12, folate, peripheral smear sent.  -monitor    3) Severe sepsis secondary to right-sided UVJ junction stone with ureterolithiasis with hydronephrosis s/p right sided percutaneous nephrostomy tube placement by IR, E.coli bacteremia.  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  His cultures are growing E. Coli.  Urology saw patient, and he underwent right-sided percutaneous nephrostomy tube placement.  He was hypotensive and so was transferred to the ICU.  He has since been transferred back hospitalist service today.  He was started on Zosyn  and linezolid for history of VRE.  He was switched to ceftriaxone based on the cultures.  He did receive subcutaneous heparin, until it was stopped today due to worsening platelet counts.  A HIT panel was sent.  Hematology was consulted.      REVIEW OF SYSTEMS:   14 point ROS was reviewed and is negative other than as noted above in HPI.       MEDICATIONS:  Current Facility-Administered Medications   Medication     acetaminophen (TYLENOL) tablet 650 mg     allopurinol (ZYLOPRIM) tablet 300 mg     argatroban (ACOVA) 1 mg/mL ANTICOAGULANT infusion     artificial saliva (BIOTENE MT) spray 1 spray     atorvastatin (LIPITOR) tablet 10 mg     bisacodyl (DULCOLAX) Suppository 10 mg     cefTRIAXone (ROCEPHIN) 2 g vial to attach to  ml bag for ADULTS or NS 50 ml bag for PEDS     dextrose 10% infusion     glucose gel 15-30 g    Or     dextrose 50 % injection 25-50 mL    Or     glucagon injection 1 mg     ipratropium - albuterol 0.5 mg/2.5 mg/3 mL (DUONEB) neb solution 3 mL     lidocaine (LMX4) cream     melatonin tablet 3 mg     metoprolol (LOPRESSOR) suspension 12.5 mg     naloxone (NARCAN) injection 0.2 mg    Or     naloxone (NARCAN) injection 0.4 mg    Or     naloxone (NARCAN) injection 0.2 mg    Or     naloxone (NARCAN) injection 0.4 mg     nitroGLYcerin (NITROSTAT) sublingual tablet 0.4 mg     ondansetron (ZOFRAN-ODT) ODT tab 4 mg    Or     ondansetron (ZOFRAN) injection 4 mg     PARoxetine (PAXIL) tablet 20 mg     Patient is already receiving anticoagulation with heparin, enoxaparin (LOVENOX), warfarin (COUMADIN)  or other anticoagulant medication     Patient is already receiving mechanical prophylaxis     polyethylene glycol (MIRALAX) Packet 17 g     senna-docusate (SENOKOT-S/PERICOLACE) 8.6-50 MG per tablet 1 tablet     sodium chloride (PF) 0.9% PF flush 10 mL     sodium chloride (PF) 0.9% PF flush 3 mL     sodium chloride (PF) 0.9% PF flush 3 mL         ALLERGIES:  No Known Allergies      PAST MEDICAL  HISTORY:  Past Medical History:   Diagnosis Date     BPH (benign prostatic hyperplasia)      Cataract      Cholelithiasis      COPD (chronic obstructive pulmonary disease) (H)      Depression      Diabetes mellitus     Type 2     Dyshidrotic foot dermatitis      Edema      Gout      Hyperlipidemia      Hypertension      Kidney stones     Bladder Stones     Lumbago      Lumbar disc displacement without myelopathy      Muscle weakness      Neuropathy, diabetic (H)      Obesity      Spinal stenosis      Stroke (H)     with residual effects- weakness LUE> LLE     Unsteady gait      Urinary retention with incomplete bladder emptying      UTI (urinary tract infection)          PAST SURGICAL HISTORY:  Past Surgical History:   Procedure Laterality Date     APPENDECTOMY OPEN       ARTHROSCOPY SHOULDER ROTATOR CUFF REPAIR       cataracts Bilateral      CHOLECYSTECTOMY       COLONOSCOPY       CYSTOSCOPY  10/19/2011    Procedure:CYSTOSCOPY; CYSTOSCOPY, BLADDER STONE REMOVAL; Surgeon:ROB SAWYER; Location: OR     CYSTOSCOPY, TRANSURETHRAL RESECTION (TUR) PROSTATE, COMBINED N/A 2/21/2018    Procedure: COMBINED CYSTOSCOPY, TRANSURETHRAL RESECTION (TUR) PROSTATE;  COMBINED CYSTOSCOPY, TRANSURETHRAL RESECTION (TUR) PROSTATE ;  Surgeon: Rob Sawyer MD;  Location:  OR     EP LOOP RECORDER IMPLANT N/A 1/20/2020    Procedure: EP Loop Recorder Implant;  Surgeon: Evgeny Parisi MD;  Location:  [facility]     EYE SURGERY      right lid surgery      IR NEPHROSTOMY TUBE PLACEMENT RIGHT  3/9/2021     JOINT REPLACEMENT Right     HIP     KNEE SURGERY Bilateral      LAMINECTOMY LUMBAR ONE LEVEL       TONSILLECTOMY           SOCIAL HISTORY:  Social History     Socioeconomic History     Marital status:      Spouse name: Not on file     Number of children: Not on file     Years of education: Not on file     Highest education level: Not on file   Occupational History     Not on file   Social Needs     Financial  resource strain: Not on file     Food insecurity     Worry: Not on file     Inability: Not on file     Transportation needs     Medical: Not on file     Non-medical: Not on file   Tobacco Use     Smoking status: Former Smoker     Smokeless tobacco: Never Used   Substance and Sexual Activity     Alcohol use: No     Comment: none for 29 yrs     Drug use: No     Sexual activity: Not on file   Lifestyle     Physical activity     Days per week: Not on file     Minutes per session: Not on file     Stress: Not on file   Relationships     Social connections     Talks on phone: Not on file     Gets together: Not on file     Attends Christianity service: Not on file     Active member of club or organization: Not on file     Attends meetings of clubs or organizations: Not on file     Relationship status: Not on file     Intimate partner violence     Fear of current or ex partner: Not on file     Emotionally abused: Not on file     Physically abused: Not on file     Forced sexual activity: Not on file   Other Topics Concern     Parent/sibling w/ CABG, MI or angioplasty before 65F 55M? Not Asked   Social History Narrative     Not on file         FAMILY HISTORY:  Family History   Problem Relation Age of Onset     Prostate Cancer Father          PHYSICAL EXAM:  Vital signs:  Temp: 97.7  F (36.5  C) Temp src: Oral BP: 113/60 Pulse: 88   Resp: 16 SpO2: 98 % O2 Device: Nasal cannula Oxygen Delivery: 4 LPM Height: 182.9 cm (6') Weight: 115.5 kg (254 lb 10.1 oz)  Estimated body mass index is 34.53 kg/m  as calculated from the following:    Height as of this encounter: 1.829 m (6').    Weight as of this encounter: 115.5 kg (254 lb 10.1 oz).    GENERAL/CONSTITUTIONAL: No acute distress.  EYES: No scleral icterus.  CARDIOVASCULAR: Regular rate and rhythm.  RESPIRATORY: Clear to auscultation bilaterally.  GASTROINTESTINAL: Non-distended  MUSCULOSKELETAL: No edema, warm and well-perfused.  NEUROLOGIC: Alert, oriented, answers questions  appropriately.  Chronic left-sided weakness.  INTEGUMENTARY: No jaundice.    LABS:  CBC RESULTS:   Recent Labs   Lab Test 03/12/21  1128   WBC 14.1*   RBC 4.18*   HGB 11.5*   HCT 38.5*   MCV 92   MCH 27.5   MCHC 29.9*   RDW 15.9*   PLT 21*       Recent Labs   Lab Test 03/12/21  1115 03/12/21  0418 03/11/21  0628   NA  --  145* 144   POTASSIUM 3.7 3.3* 4.0   CHLORIDE  --  113* 113*   CO2  --  29 24   ANIONGAP  --  3 7   GLC  --  93 109*   BUN  --  29 26   CR  --  0.81 0.95   RAJ  --  7.9* 7.8*       IMAGING:  CT abdomen/pelvis 3/9/21:  1.  Distal right ureter stone at the pelvic inlet. Two right  ureterovesical junction stones just in the bladder lumen. Associated  moderate right hydronephrosis.  2.  Multiple stones within the bladder and within the right kidney.  3.  Indeterminate wall thickening and decompression of the distal  rectum is identified. A rectal neoplasm could have this appearance and  further workup is recommended with direct visualization.  4.  Patchy airspace opacities at the left lung base. Correlate with  pneumonia.  5.  Coronary artery calcifications.          Thank you for the opportunity to participate in this patient's care.  Please call with any questions.    Shadia Bee MD  Hematology/Oncology  [facility]

## 2021-03-12 NOTE — CONSULTS
[facility]  Consult note   Hospitalist  Thais Wilson MD       Ron Gilmore MRN# [id number]   YOB: 1940 Age: 80 year old      Date of Admission:  3/9/2021         Assessment and Plan:   Ron Gilmore is a 80 year old male with history of diet-controlled diabetes mellitus, history of CVA with chronic left-sided weakness, chronic urinary retention needing chronic indwelling Cardona catheter, history of nephrolithiasis, history of COPD on 3 to 4 L of oxygen by nasal cannula presented to the hospital with shaking chills and severe sepsis secondary to right UVJ ureterolithiasis with obstruction and hydronephrosis his cultures 2 out of 2 pansensitive E. Coli.  Urology consultation requested patient underwent right sided percutaneous nephrostomy tube placement.  Patient was hypotensive and transferred to ICU was started on pressors.  He was initially on Zosyn and linezolid for history of VRE and with the E. coli being pansensitive was switched to IV ceftriaxone antibiotic currently.  He was also on heparin subcu for DVT prophylaxis.  And with the starting of heparin he is platelets gradually trended down.  Platelet count was 196 on admission on 3/9/2021 came down to 21K today.  HIT panel sent today.  Last dose of subcu heparin he received was yesterday morning at 630 since then it has been held and stopped today.    .  Severe sepsis secondary to right-sided UVJ junction stone with ureterolithiasis with hydronephrosis  Status post right-sided percutaneous nephrostomy tube placement by IR  E. coli bacteremia secondary to above;  A. fib RVR  Continue IV ceftriaxone for now  Patient is able to go off of pressors  Monitor hemodynamic status closely  Continue IV antibiotics while he is in the hospital with plan on discharging him on Ceftin twice daily until cystoscopy and ureteroscopy and stent placement in 2 to 3 weeks by urology  Lactic acidosis secondary to above normalized now  Atrial  fibrillation in the setting of sepsis.  Currently converted to normal sinus rhythm and heart rate well controlled    Thrombocytopenia in the setting of severe sepsis  Patient was also on subcu heparin with which might have contributed to thrombocytopenia;  HIT panel sent  Patient is at high risk for thrombosis if he has HIT so he was started on argatroban drip for anticoagulation while his HIT panel is pending  Hematology consultation requested regarding further evaluation of pancytopenia as well  Continue to hold a regular aspirin while he is on argatroban drip      History of COPD on 3 to 4 L of oxygen by nasal cannula;  Currently stable  Continue duo nebs as needed for wheezing or shortness of breath    Hyperlipidemia;  Continue statin    Dysphagia;  Continue tube feeds via NG for now  Speech pathology had seen him and start him on puréed diet with honey thickened liquids diet to be advanced by speech path in the next 1 to 2 days    History of depression;  Continue paroxetine    History of CVA with chronic left-sided weakness;  Aspirin on hold as patient is on argatroban and with thrombocytopenia  PT OT consultations requested    CODE STATUS is DNR/DNI    Cardona catheter in place for chronic urinary retention issues with BPH and history of CVA    DVT prophylaxis with PCD's and ambulation.  Hold off on any heparin products due to thrombocytopenia with possible HIT panel pending      Thank you for the consultation we will continue to follow him during this hospitalization and will transfer him out of the ICU    Thais Wilson MD   Page [phone](8AM-3PM)                   Code Status:   DNR / DNI         Primary Care Physician:   Augustin Thomas [phone]         Chief Complaint:   Reason for consult transfer of care consult is requested by Dr. Marino intensivist    History is obtained from the patient and review of medical records and is in discussion with the intensivist         History of Present  Illness:   Ron Gilmore is a 80 year old male with history of diet-controlled diabetes mellitus, history of CVA with chronic left-sided weakness, chronic urinary retention needing chronic indwelling Cardona catheter, history of nephrolithiasis, history of COPD on 3 to 4 L of oxygen by nasal cannula presented to the hospital with shaking chills and severe sepsis secondary to right UVJ ureterolithiasis with obstruction and hydronephrosis his cultures 2 out of 2 pansensitive E. Coli.  Urology consultation requested patient underwent right sided percutaneous nephrostomy tube placement.  Patient was hypotensive and transferred to ICU was started on pressors.  He was initially on Zosyn and linezolid for history of VRE and with the E. coli being pansensitive was switched to IV ceftriaxone antibiotic currently.  He was also on heparin subcu for DVT prophylaxis.  And with the starting of heparin he is platelets gradually trended down.  Platelet count was 196 on admission on 3/9/2021 came down to 21K today.  HIT panel sent today.  Last dose of subcu heparin he received was yesterday morning at 630 since then it has been held and stopped today.  He is being fed through NG tube.  Seen by speech pathology today and recommended honey thickened puréed diet.  Speech path to revisit with him again today as patient is more alert and awake today.  He denies any shortness of breath currently.  With the low platelets he developed some rectal examination was evaluated by urology thought no concerns for abscess or infection likely related to thrombocytopenia continue to monitor recommended.  Urology is following on eventual cystoscopy with ureteroscopy and lithotripsy for stone removal in 2 to 3 weeks as outpatient.  Currently has a right IJ in place.  Patient was also on BiPAP during his decline with severe sepsis currently taken off of BiPAP and is doing well on 4 L of oxygen by nasal cannula.  He also had some issues with A. fib RVR  with sepsis during his ICU stay.  His heart rate is well controlled in the 80s currently.  He has been off of pressors for the last 3 days and is doing very well respiratory wise and is stable transfer transfer out of the ICU so we were requested to consult and to resume care and transfer him out of the ICU           Past Medical History:     Past Medical History:   Diagnosis Date     BPH (benign prostatic hyperplasia)      Cataract      Cholelithiasis      COPD (chronic obstructive pulmonary disease) (H)      Depression      Diabetes mellitus     Type 2     Dyshidrotic foot dermatitis      Edema      Gout      Hyperlipidemia      Hypertension      Kidney stones     Bladder Stones     Lumbago      Lumbar disc displacement without myelopathy      Muscle weakness      Neuropathy, diabetic (H)      Obesity      Spinal stenosis      Stroke (H)     with residual effects- weakness LUE> LLE     Unsteady gait      Urinary retention with incomplete bladder emptying      UTI (urinary tract infection)                Past Surgical History:     Past Surgical History:   Procedure Laterality Date     APPENDECTOMY OPEN       ARTHROSCOPY SHOULDER ROTATOR CUFF REPAIR       cataracts Bilateral      CHOLECYSTECTOMY       COLONOSCOPY       CYSTOSCOPY  10/19/2011    Procedure:CYSTOSCOPY; CYSTOSCOPY, BLADDER STONE REMOVAL; Surgeon:ROB SAWYER; Location: OR     CYSTOSCOPY, TRANSURETHRAL RESECTION (TUR) PROSTATE, COMBINED N/A 2/21/2018    Procedure: COMBINED CYSTOSCOPY, TRANSURETHRAL RESECTION (TUR) PROSTATE;  COMBINED CYSTOSCOPY, TRANSURETHRAL RESECTION (TUR) PROSTATE ;  Surgeon: Rob Sawyer MD;  Location:  OR     EP LOOP RECORDER IMPLANT N/A 1/20/2020    Procedure: EP Loop Recorder Implant;  Surgeon: Evgeny Parisi MD;  Location:  [facility]     EYE SURGERY      right lid surgery      IR NEPHROSTOMY TUBE PLACEMENT RIGHT  3/9/2021     JOINT REPLACEMENT Right     HIP     KNEE SURGERY Bilateral      LAMINECTOMY  LUMBAR ONE LEVEL       TONSILLECTOMY                Home Medications:     Prior to Admission medications    Medication Sig Last Dose Taking? Auth Provider   acetaminophen (TYLENOL) 325 MG tablet Take 650 mg by mouth every 4 hours as needed for mild pain as needed for pain/fever Max dose 3000mg/24hr prn Yes Reported, Patient   allopurinol (ZYLOPRIM) 300 MG tablet Take 300 mg by mouth daily 3/8/2021 at pm Yes Unknown, Entered By History   aspirin (ASA) 325 MG EC tablet Take 1 tablet (325 mg) by mouth daily 3/8/2021 at noon Yes Sandro Maradiaga MD   atorvastatin (LIPITOR) 10 MG tablet Take 10 mg by mouth daily 3/8/2021 at pm Yes Unknown, Entered By History   Emollient (AMLACTIN ULTRA EX) Externally apply topically daily as needed APPLY TO FEET  prn Yes Reported, Patient   ipratropium - albuterol 0.5 mg/2.5 mg/3 mL 0.5-2.5 (3) MG/3ML IN neb solution Take 1 vial by nebulization every 4 hours as needed  prn Yes Reported, Patient   metoprolol tartrate (LOPRESSOR) 25 MG tablet Take 1 tablet (25 mg) by mouth 2 times daily 3/8/2021 at pm Yes Iris Mcmanus MD   PARoxetine (PAXIL) 20 MG tablet Take 20 mg by mouth daily 3/8/2021 at pm Yes Unknown, Entered By History            Allergies:   No Known Allergies         Social History:     Social History     Tobacco Use     Smoking status: Former Smoker     Smokeless tobacco: Never Used   Substance Use Topics     Alcohol use: No     Comment: none for 29 yrs                 Family History:     Family History   Problem Relation Age of Onset     Prostate Cancer Father                 Review of Systems:       The 10 point Review of Systems is negative other than noted in the HPI           Physical Exam:   Blood pressure 113/71, pulse 90, temperature 96.4  F (35.8  C), resp. rate 10, height 1.829 m (6'), weight 115.5 kg (254 lb 10.1 oz), SpO2 97 %.  254 lbs 10.1 oz    Constitutional: Alert, awake not in any distress, obese male lying comfortably in bed not in acute distress,  NG tube is  in place   Psych: Mood and affect are normal    Neuro: Cranial nerves 2-12 are intact, left-sided chronic weakness present   Eyes: No conjunctival injection, No scleral icterus, PERRLA   ENT: Ear, nose , throat are normal. Mucous membranes moist         Cardiovascular:  Regular rate rhythm, no murmurs rubs or gallops   Lungs:  Clear to auscultation anteriorly, no rales rhonchi or wheezing   Abdomen:  Soft, nontender, nondistended, bowel sounds positive no guarding rigidity or rebound   Skin:  Warm and dry, scrotal ecchymosis present   Musculoskeletal:  No other joint swelling or tenderness   Other:           Data:   All new lab and imaging data was reviewed.   Recent Labs   Lab Test 03/12/21  1128 03/12/21  0418 03/10/21  1420 03/10/21  1420   WBC 14.1* 13.9*   < >  --    HGB 11.5* 10.7*   < >  --    MCV 92 93   < >  --    PLT 21* 23*   < >  --    INR 1.20*  --   --  1.58*    < > = values in this interval not displayed.      Last Comprehensive Metabolic Panel:  Sodium   Date Value Ref Range Status   03/12/2021 145 (H) 133 - 144 mmol/L Final     Potassium   Date Value Ref Range Status   03/12/2021 3.7 3.4 - 5.3 mmol/L Final     Chloride   Date Value Ref Range Status   03/12/2021 113 (H) 94 - 109 mmol/L Final     Carbon Dioxide   Date Value Ref Range Status   03/12/2021 29 20 - 32 mmol/L Final     Anion Gap   Date Value Ref Range Status   03/12/2021 3 3 - 14 mmol/L Final     Glucose   Date Value Ref Range Status   03/12/2021 93 70 - 99 mg/dL Final     Urea Nitrogen   Date Value Ref Range Status   03/12/2021 29 7 - 30 mg/dL Final     Creatinine   Date Value Ref Range Status   03/12/2021 0.81 0.66 - 1.25 mg/dL Final     GFR Estimate   Date Value Ref Range Status   03/12/2021 85 >60 mL/min/[1.73_m2] Final     Comment:     Non  GFR Calc  Starting 12/18/2018, serum creatinine based estimated GFR (eGFR) will be   calculated using the Chronic Kidney Disease Epidemiology Collaboration   (CKD-EPI) equation.        Calcium   Date Value Ref Range Status   03/12/2021 7.9 (L) 8.5 - 10.1 mg/dL Final     Bilirubin Total   Date Value Ref Range Status   03/10/2021 1.2 0.2 - 1.3 mg/dL Final     Alkaline Phosphatase   Date Value Ref Range Status   03/10/2021 67 40 - 150 U/L Final     ALT   Date Value Ref Range Status   03/10/2021 25 0 - 70 U/L Final     AST   Date Value Ref Range Status   03/10/2021 51 (H) 0 - 45 U/L Final       Results for orders placed or performed during the hospital encounter of 03/09/21   XR Chest Port 1 View    Narrative    EXAM: XR CHEST PORT 1 VW  LOCATION: [facility]  DATE/TIME: 3/9/2021 4:13 AM    INDICATION: Fever  COMPARISON: 02/08/2021      Impression    IMPRESSION: Heart size is normal for technique. Thoracic aorta is tortuous. Recording device overlies the left chest. Lung volumes have diminished, with increasing hypoventilatory change. There is some asymmetric increased interstitial density in the   right lung, which could represent early infiltrate.   CT Abdomen Pelvis w/o Contrast    Narrative    CT ABDOMEN/PELVIS WITHOUT CONTRAST March 9, 2021 7:19 AM    CLINICAL HISTORY: Abdominal pain, fever. Hematuria, unknown cause.    TECHNIQUE: CT scan of the abdomen and pelvis was performed without IV  contrast. Multiplanar reformats were obtained. Dose reduction  techniques were used.  CONTRAST: None.    COMPARISON: CT abdomen and pelvis 10/4/2017.    FINDINGS:   LOWER CHEST: Peribronchial wall thickening and patchy opacity at the  left lung base. Emphysematous changes. Mild atelectasis at the right  base. Diffuse coronary artery calcifications.    HEPATOBILIARY: Cholecystectomy. No acute liver abnormality at  unenhanced scanning.    PANCREAS: Normal.    SPLEEN: Normal.    ADRENAL GLANDS: Normal.    KIDNEYS/BLADDER: Distal right ureter stone at the pelvic inlet  measures 0.5 cm series 3 image 190. Two adjacent stones within the  bladder just at the right ureterovesical junction. One of these  is 0.6  cm while the adjacent stone is 0.5 cm. There are four additional  bladder stones, one of these measuring up to 0.7 cm image 227. There  is moderate right hydronephrosis and right renal edema. Intrarenal  stones on the right as well. Largest stone at the lower right kidney  is 1.3 cm series 3 image 119.    BOWEL: There is an indeterminate wall thickening at distal rectal  level measuring an approximate craniocaudal length of 3.1 cm series 4  image 74, also see series 3 image 226. No obstruction. Remainder of  the bowel shows no acute abnormality.    LYMPH NODES: No enlarged lymph nodes are seen.    VASCULATURE: Calcifications noted. No aneurysm.    PELVIC ORGANS: Normal.    OTHER: No free fluid or free air.    MUSCULOSKELETAL: Right hip arthroplasty. Left hip DJD. Spine  degenerative changes. No destructive process.      Impression    IMPRESSION:   1.  Distal right ureter stone at the pelvic inlet. Two right  ureterovesical junction stones just in the bladder lumen. Associated  moderate right hydronephrosis.  2.  Multiple stones within the bladder and within the right kidney.  3.  Indeterminate wall thickening and decompression of the distal  rectum is identified. A rectal neoplasm could have this appearance and  further workup is recommended with direct visualization.  4.  Patchy airspace opacities at the left lung base. Correlate with  pneumonia.  5.  Coronary artery calcifications.    TODD GALICIA MD   IR Nephrostomy Tube Placement Right    Narrative    PROCEDURE:  1. Right antegrade nephrostogram  2. Placement of 10.2 Kittitian percutaneous nephrostomy tube    DATE OF PROCEDURE: 3/8/2021.    MEDICATIONS: 1% lidocaine.    CONTRAST: 15 mL Isovue 300 into the renal collecting system.    FLUOROSCOPY TIME: 0.7 minutes.    AIR KERMA: 12.92 mGy.    COMPLICATIONS: None.    CLINICAL HISTORY/INDICATION: Distal right obstructing renal stone with  urosepsis.    PROCEDURES AND FINDINGS: Following a discussion of the  "risks,  benefits, indications and alternatives to treatment, appropriate  informed consent was obtained. The patient was brought to the  interventional radiology suite and placed on the table. The patient's  right flank was prepped and draped in a sterile fashion. A time out  was performed per universal protocol policy to ensure correct patient,  site and procedure to be performed.     A preliminary ultrasound of the right flank was performed which  demonstrates moderate. Ultrasound images were archived. Then, under  ultrasound guidance an appropriate site was marked for needle  placement and this region was subsequently infiltrated with 1%  Lidocaine for local anesthesia. Under direct ultrasound guidance, a  21-gauge needle was inserted into the lower pole of the dilated calyx  until purulent urine return was noted. A small amount of contrast was  then injected which demonstrates access into the renal collecting  system. 20 mL of purulent fluid was aspirated. Some of the fluid was  sent to lab for culture. A 0.018\" wire was then advanced through the  needle and coiled within the renal pelvis. The needle was then  exchanged for a coaxial Angel Luis set, which was advanced over the wire  through the calyx into the renal pelvis. The 0.018\" wire was placed to  the side as a safety wire and a 0.035\" guidewire was advanced into the  renal pelvis. The tract was serially dilated with final placement of a  10.2 Lebanese nephrostomy tube with the tip coiled in the renal pelvis.  Injection of contrast demonstrates the pigtail is centered in the  renal pelvis. The contrast was aspirated and the tube was placed to  gravity bag drainage. The catheter was secured to the skin using a 2-0  silk and a sterile dressing was applied.      Throughout the procedure, the patient was monitored by a radiology  nurse for cardiac rhythm and oxygen saturation which remained stable.  The patient tolerated the procedure well and left " interventional  radiology in stable condition.      Impression    IMPRESSION:  1.  Antegrade nephrostogram demonstrating mild to moderate  hydronephrosis. Contrast flows easily down the ureter to the level of  the distal obstructing stone. No contrast is seen flowing distal to  the obstructing ureteral stone.  2. Purulent fluid was aspirated from the right renal collecting system  and sent to lab for cultures.  3. Successful placement of a right 10.2 Cuban percutaneous  nephrostomy tube, as detailed above.      SCOTTIE BARRY DO   XR Chest Port 1 View    Narrative    CHEST PORTABLE ONE VIEW   3/9/2021 5:46 PM     HISTORY: IJ placement.    COMPARISON: Chest x-ray on same day earlier.      Impression    IMPRESSION: Single portable AP view of the chest was obtained.  Interval placement of right IJ central catheter with the tip projected  over mid SVC. Stable cardiomediastinal silhouette. Mild bibasilar  pulmonary opacities, likely atelectasis. No significant pleural  effusion or pneumothorax.    LING STOUT MD   XR Abdomen Port 1 View    Narrative    ABDOMEN ONE VIEW PORTABLE  3/10/2021 7:08 PM     HISTORY: NJ tube placement.    COMPARISON: None.      Impression    IMPRESSION: An enteric tube is likely coiled in the stomach, with tip  projected over the fundal region. Visualized bowel gas pattern is  otherwise within normal limits. A pigtail drain is projected over the  right midabdomen. Prior cholecystectomy.    BRUNO PAYAN MD   XR Abdomen Port 1 View    Narrative    ABDOMEN ONE VIEW PORTABLE  3/11/2021 6:40 PM     HISTORY: Feeding tube positioning.    COMPARISON: 3/10/2021.      Impression    IMPRESSION: Enteric tube is again coiled in the stomach, with the tip  near the fundus. Pigtail catheter is again projected over the right  midabdomen. Paucity of gas is noted in the visualized bowel. Surgical  clips RUQ.    BRUNO PAYAN MD           Recent Labs   Lab Test 03/12/21  1115 03/12/21  8435  03/11/21  0628   NA  --  145* 144   POTASSIUM 3.7 3.3* 4.0   CHLORIDE  --  113* 113*   CO2  --  29 24   BUN  --  29 26   CR  --  0.81 0.95   ANIONGAP  --  3 7   RAJ  --  7.9* 7.8*   GLC  --  93 109*     Recent Labs   Lab Test 01/14/20  4776 10/12/18  2032   TROPI <0.015 <0.015

## 2021-03-12 NOTE — PROGRESS NOTES
ICU [facility] Cross Cover    Asked if NG tube placement was okay to start tube feeding. Reviewed abdominal xray from 3/11/2021 at 1817 for tube placement. Tube appears coiled and located in the stomach. Does not appear to have any contraindications to gastric feeding. Gave okay to start tube feeding.    Connor Orlando, MS4    Agree with above note.     Low UOP noted this am. Trial lasix to aid In fluid mobilization.     Thuy Vázquez MD 3/12/2021 5:09 AM

## 2021-03-12 NOTE — PLAN OF CARE
Neuro: Oriented, PERRLA, follows commands. R sided deficits and R sided facial droop. Purposeful x4.   CV: tele NSR c PVCs  Resp: LS coarse, 4L NC. Loose, congested cough  GI: BS active. Tube feeds @ 30/hr, 60ml q4 flush.  : chirinos, urostomy. Low UO  Skin: PI to coccyx, purple/bruised scrotum  Lines: na  Access: RIJ, L PIV  Gtts: na  Other: heparin held

## 2021-03-12 NOTE — PROVIDER NOTIFICATION
MD Notification    Notified Person Name: Dr. Gurpreet MD    Notification Date/Time: 3/12 0500    Purpose of Notification: notified of critical platelet level and low UO throughout the night    Orders Received: one time dose lasix, monitor

## 2021-03-12 NOTE — PROGRESS NOTES
"Interventional Radiology Progress Note:  Inpatient at [facility]  Date: 2021   Patient name: Ron Gilmore  MRN:[id number]  :  1940    History: Ron Gilmore is a 80 year old male with a history significant for CVA with left sided weakness, BPH with outlet obstruction, TURP in 2018; with chronic chirinos per urology and nephrolithiasis. He was admitted 3/9 with fevers, chills,  Hypoxemia and was found to be bacteremic upon further testing. CT showed a \"Distal right ureter stone at the pelvic inlet. Two right ureterovesical junction stones just in the bladder lumen. Associated moderate right hydronephrosis and Multiple stones within the bladder and within the right kidney.\" A right nephrostomy tube was requested for decompression/drainage which was placed on 3/9.      Interval History: Patient sitting up in bed watching TV. Says \"I didn't even know I had a tube in my back\". Denies pain at neph tube site. Feeling better overall      Physical Exam:   Vitals: Temp:  [95.9  F (35.5  C)-97.5  F (36.4  C)] 96.6  F (35.9  C)  Pulse:  [78-98] 78  Resp:  [8-14] 10  BP: ()/(47-76) 94/76  MAP:  [74 mmHg-102 mmHg] 74 mmHg  Arterial Line BP: (-)/(-33-74) -33/-33  SpO2:  [81 %-99 %] 96 %  In no acute distress sitting up in bed, appears comfortable. Converses easily.      Right nephrostomy tube:   -Bag was emptied recently, tube draining clear yellow urine     Output   3/9    130 mL  3/10  565 mL   3/11  1245 mL  3/12  1060 mL so far today    Labs:  ROUTINE ICU LABS (Last four results)  CMP  Recent Labs   Lab 21  1115 21  0418 21  0628 03/10/21  0621 21  1415 21  1146 21  0346   NA  --  145* 144 143 144  --  136   POTASSIUM 3.7 3.3* 4.0 4.1 4.5 4.4 4.6   CHLORIDE  --  113* 113* 113* 113*  --  102   CO2  --  29 24 17* 23  --  30   ANIONGAP  --  3 7 13 8  --  4   GLC  --  93 109* 126* 72  --  145*   BUN  --  29 26 26 20  --  18   CR  --  0.81 0.95 1.24 " 1.05  --  0.98   GFRESTIMATED  --  85 76 55* 67  --  74   GFRESTBLACK  --  >90 89 64 78  --  85   RAJ  --  7.9* 7.8* 7.4* 7.9*  --  8.1*   MAG  --  2.0 2.1 1.7  --  1.9  --    PHOS  --   --  3.3 4.5  --   --   --    PROTTOTAL  --   --   --  6.4*  --   --  7.5   ALBUMIN  --   --   --  1.7*  --   --  2.3*   BILITOTAL  --   --   --  1.2  --   --  1.1   ALKPHOS  --   --   --  67  --   --  68   AST  --   --   --  51*  --   --  14   ALT  --   --   --  25  --   --  13     CBC  Recent Labs   Lab 03/12/21  1128 03/12/21  0418 03/11/21  0628 03/10/21  0621   WBC 14.1* 13.9* 25.0* 30.6*   RBC 4.18* 3.81* 4.22* 4.45   HGB 11.5* 10.7* 11.6* 12.4*   HCT 38.5* 35.5* 38.6* 41.7   MCV 92 93 92 94   MCH 27.5 28.1 27.5 27.9   MCHC 29.9* 30.1* 30.1* 29.7*   RDW 15.9* 15.9* 15.9* 15.7*   PLT 21* 23* 40* 88*     Cultures: E Coli    Assessment: s/p right nephrostomy tube placement on 3/9/21 in the setting of distal right ureteral calculus and urosepsis    Plan: Monitor labs, vital signs and output. Nephrostomy tube functioning well, change dressing every 2-3 days. Continue abx. Urology plan noted for possible antegrade stent placement with IR once out of ICU and prior to discharge, then eventual cystoscopy with ureteroscopy and lithotripsy for stone removal in 2-3wks as outpatient.        10 minutes were spent with patient during today's visit with greater than 50% of the time spent face to face with the patient, in reviewing medical record and images and in counseling and coordinating patient's care.    Ramya Vishal PA-C   Interventional Radiology

## 2021-03-12 NOTE — PLAN OF CARE
Pt MAP>66 maintained with vaso gtt, levo gtt weaned off. HR=90s, SR. SpO2 stable on 12L O2 per oxymask. Afebrile. Moderate clear yellow output from R nephrostomy tube, darker cloudy urine with sediment per chronic chirinos. Scrotum noted to be bruised/scabbing, MD notified. Heparin held d/t low platelets.

## 2021-03-12 NOTE — PROGRESS NOTES
Critical Care Progress Note      03/12/2021    Name: Ron Gilmore MRN#: [id number]   Age: 80 year old YOB: 1940     Hsptl Day# 3  ICU DAY #    MV DAY #           Problem List:   Principal Problem:    Severe sepsis (H)  Active Problems:    Lactic acidosis    Febrile illness    Urinary tract infection associated with indwelling urethral catheter, initial encounter (H)    Obstructive right sided ureteral stone resulting in hydronephrosis           Summary/Hospital Course:     Mr. Ron Gilmore is a 80 year old male with a past medical history of renal lithiasis, BPH, urinary retension with chronic indwelling Cardona, CVA with residual left-sided weakness, and COPD admitted from the Emergency Department on 3/9/2021 urosepsis 2/2 ureterolithiasis. Nephrostomy tube placement by IR on 3/9, subsequently became hemodynamically unstable, requiring central venous and arterial access with vasopressors and BiPAP. Found to have pan-sensitive E. coli bacteriuria and bacteremia, on ceftriaxone. Now weaned off pressure support and supplemental oxygen.      Assessment and plan :     Ron Gilmore IS a 80 year old male admitted on 3/9/2021 for sepsis secondary to pyleonephritis 2/2 ureterolithiasis now s/p right nephrostomy tube placement by IR now weaned off pressure support and supplemental oxygen.    I have reviewed the daily labs, imaging studies, cultures and discussed the case with referring physician and consulting physicians.    My assessment and plan by system for this patient is as follows:     Neurology/Psychiatry:   Awake and alert. Hx of CVA with residual L sided weakness.  Plan  - acetaminophen 650 q4h scheduled  - melatonin 3 mg PO QHS     Cardiovascular:   CHADSVASC Score: 6 (9.7% annual stroke risk). No ischemic changes on ECG, no significant changes compared to prior on 1/20/20. Hemodynamically stable off pressors. Thrombocytopenia to 20. Had ~2 episodes of afib with RVR requiring beta blockade, no  further episodes.  1. Hypertension  2. Hyperlipidemia    Plan  1. PTA Atorvastatin  2. HOLD Aspirin     Pulmonary/Ventilator Management:  On 2 L chronic home oxygen. Patient has a DNR/DNI. Off supplemental oxygen today 3/12.  1. COPD  2. BRAD     Plan  - DuoNeb q6h PRN  - 4L O2 via NC     GI and Nutrition:   1. Dysphagia/Aspiration Risk  2. Nausea  Plan  - Diet: Per SLP - NPO with spoons of honey thick liquids for comfort.   - Will place NJ tube & nutrition consult for TF's, SLP see again today 3/12/21 to approve diet, hold diet for now  - Zofran PRN for nausea  - Senna BID     Renal/Fluids/Electrolytes:   1. Scrotal pain/skin changes, seen by Urology who thought this is likely just ecchymosis 2/2 thrombocytopenia  2. POD #3 IR percutaneous nephrostomy tube, right. Draining well, clear fluid.  3. Lactic acidosis, resolved  4. Making adequate urine.  5. Gout    Plan:  - Follow CBC, BMP  - PTA Allopurinol  - Follow I/O's as appropriate.     Infectious Disease:  1. Urosepsis 2/2 ureterolithiasis  2. Hydronephrosis  3. Pan-sensitive E. Coli bacteriuria/bacteremia  4. Hx of VRE UTI (2018)  5. Hx of kidney stones    Plan  - Ceftriaxone 2g q24h     Endocrine:   1. Monitor for stress induced hyperglycemia.  2. Evaluated for CIRCI, cortisol returned elevated, reflecting appropriate adrenal response to illness    Plan  - ICU insulin protocol, goal sugar <180     Hematology/Oncology:   1. Hgb 13.9,  2. Leukocytosis to 13.9, down from 25 yesterday 3/11  3. Thrombocytopenia to 20. 4Ts Score: <5% risk of HIT, no recent prior exposure to heparin. Likely consumptive, 2/2 to sepsis and microvascular thrombosis, but now the fourth day of downtrending plts.  Decreasing by 50% daily. Fibrinogen is wnl. Unknown reason for thrombocytopenia, possibly medication (e.g. antibiotics). If medication, likely Pip-Tazo, less likely linezolid given short course (dose-dependent thrombocytopenia), less likely ceftriaxone given that downtrend preceded  initiation. This could be an auto-immune phenomena or could still be HIT.     Plan   - Keep internal jugular line in place for labs  - Hematology consult  - Peripheral blood smear  - HIT test  - HOLD Heparin  - Trend Hgb  - Trend WBCs  - Transfuse for <10 or if a procedure is needed    IV/Access:   1. Venous access - R internal jugular CVC, PIV's x 2  2. Arterial access -  None     ICU Prophylaxis:   1. DVT: Heparin subcutaneous - HOLD  2. Stress Ulcer: PPI/H2 blocker  3. Restraints: Not indicated  4. Wound care  - Not indicated  5. Feeding - SLP recommending teaspoons of honey-thick liquids for comfort only, appreciate their recs for today 3/12/21  6. Family Update: Pending  7. Disposition - Transfer to the floor, hospitalist accepted    Key goals for next 24 hours:   1. Transfer to the floor, hospitalist accepted.         Interim History:     3/12/21: Fourdays of down trending platelets (196 -> 88 -> 40 -> 24). Low 4Ts Score (<5% risk of HIT). Aspirin and heparin held. Scrotal changes thought to be ecchymotic 2/2 thrombocytopenia. Hematology consulted and further workup of thrombocytopenia initiated, hospitalist accepted the patient for transfer to the floor.         Key Medications:       acetaminophen  650 mg Oral Q6H     allopurinol  300 mg Oral QPM     atorvastatin  10 mg Oral QPM     cefTRIAXone  2 g Intravenous Q24H     [Held by provider] heparin ANTICOAGULANT  5,000 Units Subcutaneous Q8H     metoprolol  2.5 mg Intravenous Q6H     [Held by provider] metoprolol tartrate  25 mg Oral BID     PARoxetine  20 mg Oral QPM     senna-docusate  1 tablet Oral At Bedtime     sodium chloride (PF)  10 mL Irrigation Q24H       dextrose       norepinephrine Stopped (03/11/21 1232)     - MEDICATION INSTRUCTIONS -       - MEDICATION INSTRUCTIONS -       vasopressin Stopped (03/11/21 1613)             Physical Examination:   Temp:  [95.9  F (35.5  C)-97.7  F (36.5  C)] 95.9  F (35.5  C)  Pulse:  [82-98] 82  Resp:  [8-14]  10  BP: ()/(47-74) 94/66  MAP:  [74 mmHg-221 mmHg] 74 mmHg  Arterial Line BP: (-)/(-) -33/-33  SpO2:  [81 %-99 %] 96 %    Intake/Output Summary (Last 24 hours) at 3/10/2021 0721  Last data filed at 3/10/2021 0600  Gross per 24 hour   Intake 2896.28 ml   Output 1125 ml   Net 1771.28 ml     Wt Readings from Last 4 Encounters:   03/12/21 115.5 kg (254 lb 10.1 oz)   02/08/21 122.5 kg (270 lb)   03/12/20 119.6 kg (263 lb 11.2 oz)   03/10/20 118.1 kg (260 lb 6.4 oz)     Arterial Line BP: (-)/(-) -33/-33  MAP:  [74 mmHg-221 mmHg] 74 mmHg  BP - Mean:  [] 77  Resp: 10    Recent Labs   Lab 03/11/21  1935 03/11/21  1535 03/10/21  0740 03/09/21  1415   PH 7.29* 7.28* 7.22*  --    PCO2 59* 57* 45  --    PO2 161* 130* 184*  --    HCO3 29* 26 18*  --    O2PER 12L 12L 15 L Rikki        GEN: no acute distress, sleeping.  HEENT: head ncat, sclera anicteric, OP patent, trachea midline. Right internal jugular line in place  PULM: unlabored, clear anteriorly. Some upper airway gurgling.  CV/COR: Regular rate, regular rhythm. S1S2 no gallop,  No rub, no murmur.  ABD: soft nontender, hypoactive bowel sounds, no mass  MSK/EXT: L sided weakness and limited movement.    NEURO: Alert and oriented. Moves head freely. Smiles and makes jokes. Comments on how confused he was yesterday compared to today.  SKIN: See skin changes in wound care note. Scrotum with 2-3 areas of blackened skin with surrounding erythema, not diffuse. No edema, firmness, or subQ emphysema. No warmth. Tender to touch in areas of erythema.  LINES: clean, dry intact         Data:   All data and imaging reviewed     ROUTINE ICU LABS (Last four results)  CMP  Recent Labs   Lab 03/12/21  0418 03/11/21  0628 03/10/21  0621 03/09/21  1415 03/09/21  1146 03/09/21  0346   * 144 143 144  --  136   POTASSIUM 3.3* 4.0 4.1 4.5 4.4 4.6   CHLORIDE 113* 113* 113* 113*  --  102   CO2 29 24 17* 23  --  30   ANIONGAP 3 7 13 8  --  4   GLC 93 109* 126*  72  --  145*   BUN 29 26 26 20  --  18   CR 0.81 0.95 1.24 1.05  --  0.98   GFRESTIMATED 85 76 55* 67  --  74   GFRESTBLACK >90 89 64 78  --  85   RAJ 7.9* 7.8* 7.4* 7.9*  --  8.1*   MAG 2.0 2.1 1.7  --  1.9  --    PHOS  --  3.3 4.5  --   --   --    PROTTOTAL  --   --  6.4*  --   --  7.5   ALBUMIN  --   --  1.7*  --   --  2.3*   BILITOTAL  --   --  1.2  --   --  1.1   ALKPHOS  --   --  67  --   --  68   AST  --   --  51*  --   --  14   ALT  --   --  25  --   --  13     CBC  Recent Labs   Lab 03/12/21  0418 03/11/21  0628 03/10/21  0621 03/09/21  2141 03/09/21  1415   WBC 13.9* 25.0* 30.6*  --  3.6*   RBC 3.81* 4.22* 4.45  --  4.76   HGB 10.7* 11.6* 12.4* 11.8* 13.3   HCT 35.5* 38.6* 41.7  --  46.1   MCV 93 92 94  --  97   MCH 28.1 27.5 27.9  --  27.9   MCHC 30.1* 30.1* 29.7*  --  28.9*   RDW 15.9* 15.9* 15.7*  --  15.5*   PLT 23* 40* 88*  --  125*     INR  Recent Labs   Lab 03/10/21  1420   INR 1.58*     Arterial Blood Gas  Recent Labs   Lab 03/11/21  1935 03/11/21  1535 03/10/21  0740 03/09/21  1415   PH 7.29* 7.28* 7.22*  --    PCO2 59* 57* 45  --    PO2 161* 130* 184*  --    HCO3 29* 26 18*  --    O2PER 12L 12L 15 L Rikki        All cultures:  Recent Labs   Lab 03/09/21  1023 03/09/21  0416 03/09/21  0346   CULT >100,000 colonies/mL  Escherichia coli  * >100,000 colonies/mL  Escherichia coli  *  Cultured on the 1st day of incubation:  Escherichia coli  Susceptibility testing done on previous specimen  *  Critical Value/Significant Value, preliminary result only, called to and read back by   Levi Meza RN @6388 3.9.21 LM   Cultured on the 1st day of incubation:  Escherichia coli  *  Critical Value/Significant Value, preliminary result only, called to and read back by  Suzy Rizzo RN at 1232 3.9.21 KZ    (Note)  POSITIVE for E.COLI by [employer]igene multiplex nucleic acid test. Final  identification and antimicrobial susceptibility testing will be  verified by standard methods. Verigene test will not  distinguish  E.coli from Shigella species including S.dysenteriae, S.flexneri,  S.boydii, and S.sonnei. Specimens containing Shigella species or  E.coli will be reported as Positive for E.coli.    Specimen tested with Verigene multiplex, gram-negative blood culture  nucleic acid test for the following targets: Acinetobacter sp.,  Citrobacter sp., Enterobacter sp., Proteus sp., E. coli, K.  pneumoniae/oxytoca, P. aeruginosa, and the following resistance  markers: CTXM, KPC, NDM, VIM, IMP and OXA.    Critical Value/Significant Value called to and read back by Levi Castañeda RN at 1954 on 3.9.21 CW         Recent Results (from the past 24 hour(s))   XR Abdomen Port 1 View    Narrative    ABDOMEN ONE VIEW PORTABLE  3/11/2021 6:40 PM     HISTORY: Feeding tube positioning.    COMPARISON: 3/10/2021.      Impression    IMPRESSION: Enteric tube is again coiled in the stomach, with the tip  near the fundus. Pigtail catheter is again projected over the right  midabdomen. Paucity of gas is noted in the visualized bowel. Surgical  clips RUQ.    BRUNO PAYAN MD       Billing: This patient is critically ill: Yes. Total critical care time today 45 min***.    I, Delvin Newell, MS4 am serving as a scribe in the creation of this medical document on behalf of Juli Blevins MD.

## 2021-03-13 ENCOUNTER — APPOINTMENT (OUTPATIENT)
Dept: CT IMAGING | Facility: CLINIC | Age: 81
DRG: 871 | End: 2021-03-13
Attending: INTERNAL MEDICINE
Payer: COMMERCIAL

## 2021-03-13 ENCOUNTER — APPOINTMENT (OUTPATIENT)
Dept: SPEECH THERAPY | Facility: CLINIC | Age: 81
DRG: 871 | End: 2021-03-13
Payer: COMMERCIAL

## 2021-03-13 LAB
APTT PPP: 85 SEC (ref 22–37)
ERYTHROCYTE [DISTWIDTH] IN BLOOD BY AUTOMATED COUNT: 15.9 % (ref 10–15)
FOLATE SERPL-MCNC: 3.4 NG/ML
GLUCOSE BLDC GLUCOMTR-MCNC: 125 MG/DL (ref 70–99)
GLUCOSE BLDC GLUCOMTR-MCNC: 132 MG/DL (ref 70–99)
GLUCOSE BLDC GLUCOMTR-MCNC: 134 MG/DL (ref 70–99)
HCT VFR BLD AUTO: 35.9 % (ref 40–53)
HGB BLD-MCNC: 10.8 G/DL (ref 13.3–17.7)
LACTATE BLD-SCNC: 1.1 MMOL/L (ref 0.7–2)
MCH RBC QN AUTO: 27.8 PG (ref 26.5–33)
MCHC RBC AUTO-ENTMCNC: 30.1 G/DL (ref 31.5–36.5)
MCV RBC AUTO: 92 FL (ref 78–100)
PF4 HEPARIN CMPLX AB SER QL: NEGATIVE
PLATELET # BLD AUTO: 24 10E9/L (ref 150–450)
RBC # BLD AUTO: 3.89 10E12/L (ref 4.4–5.9)
VIT B12 SERPL-MCNC: 413 PG/ML (ref 193–986)
WBC # BLD AUTO: 9.8 10E9/L (ref 4–11)

## 2021-03-13 PROCEDURE — 999N001017 HC STATISTIC GLUCOSE BY METER IP

## 2021-03-13 PROCEDURE — 250N000013 HC RX MED GY IP 250 OP 250 PS 637: Performed by: INTERNAL MEDICINE

## 2021-03-13 PROCEDURE — 99232 SBSQ HOSP IP/OBS MODERATE 35: CPT | Performed by: INTERNAL MEDICINE

## 2021-03-13 PROCEDURE — 999N000147 HC STATISTIC PT IP EVAL DEFER

## 2021-03-13 PROCEDURE — 250N000011 HC RX IP 250 OP 636: Performed by: INTERNAL MEDICINE

## 2021-03-13 PROCEDURE — 70450 CT HEAD/BRAIN W/O DYE: CPT

## 2021-03-13 PROCEDURE — 83605 ASSAY OF LACTIC ACID: CPT | Performed by: INTERNAL MEDICINE

## 2021-03-13 PROCEDURE — 120N000001 HC R&B MED SURG/OB

## 2021-03-13 PROCEDURE — 99233 SBSQ HOSP IP/OBS HIGH 50: CPT | Performed by: INTERNAL MEDICINE

## 2021-03-13 PROCEDURE — 92526 ORAL FUNCTION THERAPY: CPT | Mod: GN | Performed by: SPEECH-LANGUAGE PATHOLOGIST

## 2021-03-13 PROCEDURE — 36415 COLL VENOUS BLD VENIPUNCTURE: CPT | Performed by: INTERNAL MEDICINE

## 2021-03-13 PROCEDURE — 85730 THROMBOPLASTIN TIME PARTIAL: CPT | Performed by: INTERNAL MEDICINE

## 2021-03-13 PROCEDURE — 82746 ASSAY OF FOLIC ACID SERUM: CPT | Performed by: INTERNAL MEDICINE

## 2021-03-13 PROCEDURE — 85027 COMPLETE CBC AUTOMATED: CPT | Performed by: INTERNAL MEDICINE

## 2021-03-13 RX ORDER — LANOLIN ALCOHOL/MO/W.PET/CERES
3 CREAM (GRAM) TOPICAL AT BEDTIME
Status: DISCONTINUED | OUTPATIENT
Start: 2021-03-13 | End: 2021-03-14

## 2021-03-13 RX ADMIN — ACETAMINOPHEN 650 MG: 325 TABLET, FILM COATED ORAL at 21:54

## 2021-03-13 RX ADMIN — Medication 1 SPRAY: at 09:28

## 2021-03-13 RX ADMIN — PAROXETINE HYDROCHLORIDE 20 MG: 20 TABLET, FILM COATED ORAL at 21:49

## 2021-03-13 RX ADMIN — ATORVASTATIN CALCIUM 10 MG: 10 TABLET, FILM COATED ORAL at 21:49

## 2021-03-13 RX ADMIN — Medication 12.5 MG: at 09:28

## 2021-03-13 RX ADMIN — MELATONIN TAB 3 MG 3 MG: 3 TAB at 00:28

## 2021-03-13 RX ADMIN — Medication 12.5 MG: at 21:51

## 2021-03-13 RX ADMIN — ARGATROBAN 0.5 MCG/KG/MIN: 50 INJECTION INTRAVENOUS at 06:48

## 2021-03-13 RX ADMIN — CEFTRIAXONE SODIUM 2 G: 2 INJECTION, POWDER, FOR SOLUTION INTRAMUSCULAR; INTRAVENOUS at 11:32

## 2021-03-13 RX ADMIN — MELATONIN TAB 3 MG 3 MG: 3 TAB at 21:51

## 2021-03-13 RX ADMIN — ACETAMINOPHEN 650 MG: 325 TABLET, FILM COATED ORAL at 17:15

## 2021-03-13 RX ADMIN — ALLOPURINOL 300 MG: 300 TABLET ORAL at 21:49

## 2021-03-13 RX ADMIN — ACETAMINOPHEN 650 MG: 325 TABLET, FILM COATED ORAL at 09:21

## 2021-03-13 ASSESSMENT — ACTIVITIES OF DAILY LIVING (ADL)
ADLS_ACUITY_SCORE: 26
ADLS_ACUITY_SCORE: 25
ADLS_ACUITY_SCORE: 26

## 2021-03-13 NOTE — PROGRESS NOTES
UROLOGY PROGRESS NOTE: ANW    March 13, 2021    INTERVAL HISTORY:    Alert & oriented.   Right PCN and chirinos with clear urine outputs   Scrotal tenderness on exam but no apparent changes from yesterday     OBJECTIVE:  Temp:  [97.4  F (36.3  C)-100.2  F (37.9  C)] 98.8  F (37.1  C)  Pulse:  [] 104  Resp:  [16-20] 20  BP: ()/(57-64) 110/64  SpO2:  [94 %-100 %] 95 %    Intake/Output Summary (Last 24 hours) at 3/13/2021 1558  Last data filed at 3/13/2021 1400  Gross per 24 hour   Intake 1423 ml   Output 2090 ml   Net -667 ml       GENERAL:  Awake, alert and oriented  HEAD: Normocephalic atraumatic  SCLERA: Anicteric  Right PCN and chirinos patient with clear to allan UOP  Scrotal tenderness on exam with echymosis    LABS:  Lab Results   Component Value Date    WBC 9.8 03/13/2021     Lab Results   Component Value Date    HGB 10.8 03/13/2021     Lab Results   Component Value Date    HCT 35.9 03/13/2021     Lab Results   Component Value Date    PLT 24 03/13/2021     Last Basic Metabolic Panel:  Lab Results   Component Value Date     03/12/2021      Lab Results   Component Value Date    POTASSIUM 3.7 03/12/2021     Lab Results   Component Value Date    CHLORIDE 113 03/12/2021     Lab Results   Component Value Date    RAJ 7.9 03/12/2021     Lab Results   Component Value Date    CO2 29 03/12/2021     Lab Results   Component Value Date    BUN 29 03/12/2021     Lab Results   Component Value Date    CR 0.81 03/12/2021     Lab Results   Component Value Date    GLC 93 03/12/2021       Assessment & Plan     Ron Gilmore is a 80 year old male who was admitted on 3/9/2021. Urology following for distal right ureteral calculus, urosepsis; s/p right PCN placement by IR on 3/9; scrotal ecchymosis with thrombocytopenia. Right PCN draining allan UOP.       Plan: no interval changes for urology at this time    1. Distal right ureteral calculus and urosepsis   -cont right PCN for now, possible antegrade stent placement with  IR once out of ICU and prior to discharge   -cont culture specific abx   -chronic chirinos   -eventual cystoscopy with ureteroscopy and lithotripsy for stone removal in 2-3wks as outpatient      2. Scrotal ecchymosis  -no concerns for abscess or infection  -likely related to thrombocytopenia    Kate Atwood PA-C  [facility], a [facility]  Office Phone: [phone]  After 4pm and on weekends, please call [phone]

## 2021-03-13 NOTE — PLAN OF CARE
DATE & TIME: 3/12/2021 4124-0363  Cognitive Concerns/ Orientation : A&O x4 in evening and AM, oriented to self only overnight. Calm & cooperative. Speech garbed, slow. Pulling at CVC in AM, mitt applied to R hand  BEHAVIOR & AGGRESSION TOOL COLOR: Green  ABNL VS/O2: VSS on 4L NC ex soft BP  MOBILITY: Total, T&R Q2h (refuses at times). Baseline aleks lift at home. L sided hemiparesis, R eye droop (baseline)  PAIN MANAGMENT: Denies ex occasional R nostril discomfort due to NC and NJ tube   DIET: DD1 with honey thickened liquids. Needs assistance. Meds crushed in apple sauce. Speech and nutrition following. TF running at goal rate of 70mL/hr with q4h 60mL water flush  BOWEL/BLADDER: R neph tube patent, chirinos cath. 1 large BM in AM  ABNL LAB/BG: Plt 24, PTT 85, WBC 14.1. HIT drawn  DRAIN/DEVICES: R CVC infusing argatroban (redressed), L arm PIV SL, R nare NJ with TF, R neph tube (pink/red output), chirinos cath  TELEMETRY RHYTHM: NSR  SKIN: Scattered bruising/scabs, left groin stick scab intact, bruised and excoriated scrotum, coccyx blanchable (mepilex applied)  TESTS/PROCEDURES: None scheduled  D/C DAY/GOALS/PLACE: Pending improvement and consults. Pt lives at home with wife   OTHER IMPORTANT INFO: Q24h Rocephin. Loose, congested, nonproductive cough. LS diminished with BLL coarse crackles at times. Hematology and urology following. PRN bedtime melatonin given. Contact precautions maintained.

## 2021-03-13 NOTE — PROVIDER NOTIFICATION
"MD Notification    Notified Person: MD    Notified Person Name: Saad Wilson    Notification Date/Time: 3/13/2021 6203    Notification Interaction: web page    Purpose of Notification: Hem/Onc note in and says \"HIT Ab is negative, so okay to stop argatroban drip.\" Are you able to discontinue the med? Thanks!     Orders Received: discontinued argatroban    Comments:    "

## 2021-03-13 NOTE — CONSULTS
Care Management Initial Consult    General Information  Assessment completed with: Family, wife Yuli ([phone]) who was with daughter (designated visitor) López  Type of CM/SW Visit: Initial Assessment    Primary Care Provider verified and updated as needed: Yes  + PCP is Dr. [facility] [phone]  Readmission within the last 30 days:        Reason for Consult: discharge planning  Advance Care Planning:      + has POLST dated 1-24-20 on file;   + had a HCD provided but was deemed invalid due to missing pages--updated pt spouse & daughter to bring in full HCD document   General Information Comments:   + Urologist is Dr. Sullivan [phone],   + Cardiology Dr. Torres/Dr. Parisi [phone]    Communication Assessment  Patient's communication style: spoken language (English or Bilingual)    Hearing Difficulty or Deaf: no   Wear Glasses or Blind: no    Cognitive  Cognitive/Neuro/Behavioral: .WDL except  Level of Consciousness: alert, confused  Arousal Level: opens eyes spontaneously  Orientation: disoriented to, place, time, situation  Mood/Behavior: calm, cooperative  Best Language: 1 - Mild to moderate  Speech: garbled    Living Environment:   People in home: spouse, child(cyrus), adult, grandchild(cyrus)  Wife-Yuli, and adult children/grandchildren help out   Current living Arrangements: apartment(condo)      Able to return to prior arrangements: (TBD)     Family/Social Support:  Care provided by: spouse/significant other, other (see comments)   Comments: PCAs via [facility], one is a grandchild  Provides care for: (n/a)  Marital Status:    Support system: Wife (Yuli), Children, Grandchildren, PCA    Description of Support System: Supportive, Involved    Support Assessment: Adequate family and caregiver support    Current Resources:   Patient receiving home care services: Yes  Skilled Home Care Services: Skilled Nursing(via [facility]) [phone]  [facility]  Resources: PCA(via [facility] - 2 PCA's (one is grandson) )  Equipment currently used at home: hospital bed, wheelchair, manual, wheelchair, power(Overhead triangle for positioning; Lift chair )  Supplies currently used at home: Incontinence Supplies, Oxygen Tubing/Supplies ([facility], rarely needs O2 during activity), Other([employer] supplies)    Employment/Financial:  Employment Status: retired     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  Financial Concerns: No concerns identified   Finance Comments: active UCARE/UCARE MEDICARE NON [employer] insurance    Lifestyle & Psychosocial Needs:  Socioeconomic History     Marital status:      Spouse name: Not on file     Number of children: Not on file     Years of education: Not on file     Highest education level: Not on file     Tobacco Use     Smoking status: Former Smoker     Smokeless tobacco: Never Used   Substance and Sexual Activity     Alcohol use: No     Comment: none for 29 yrs     Drug use: No       Functional Status:  Prior to admission patient needed assistance:  At baseline pt does have a hospital bed in his home, wife provides cares and is supported by pt's two PCA's, one is the grandson who does exercises with pt on the left extremities and gets pt out of bed to chair; the other is a gal who takes care of bathing and brief changes; both are via [facility] and daughter assists with the paperwork for this.      Mental Health Status:  Mental Health Status: No Current Concerns       Chemical Dependency Status:  Chemical Dependency Status: No Current Concerns        Values/Beliefs:  Spiritual, Cultural Beliefs, Alevism Practices, Values that affect care: no          Values/Beliefs Comment: Confucianist    Additional Information:  Spoke with pt's spouse Yuli (daughter López was with pt's wife) via phone.  Confirmed the above.  Also at baseline pt eats a fairly regular diet with softer food choices and thickened  liquids--pt not at baseline with PO intake yet.  Disposition will depend on pt status closer to discharge.  They would resume [facility] if returning home.  If they felt they could not provide care at home they would require TCU list.  CM staff will need to monitor for diet advancement and antibiotic recommendations as part of planning for discharge.      Keyla Harper RN, BSN, PHN  [facility]  Inpatient Care Management - FLOAT  Mobile: [phone] 03/13/21 until 4pm  (after today's date, please call the patient's unit)

## 2021-03-13 NOTE — PROGRESS NOTES
DATE & TIME: 3/12/2021 2271-1306  Cognitive Concerns/ Orientation : A&O x4 in the AM,now the pt is sleepy. Calm & cooperative. Speech garbed, slow.  BEHAVIOR & AGGRESSION TOOL COLOR: Green  ABNL VS/O2: VSS on 4L NC ex soft BP  MOBILITY: Total, T&R Q2h (refuses at times). Baseline aleks lift at home. L sided hemiparesis, R eye droop (baseline)  PAIN MANAGMENT: Pain all over the body, Tylenol given.  DIET: DD1 with honey thickened liquids. Needs assistance. Meds crushed in apple sauce. Speech and nutrition following. TF running at goal rate of 70mL/hr with q4h 60mL water flush  BOWEL/BLADDER: R neph tube patent, chirinos cath. BM today.  ABNL LAB/BG: Plt 24, PTT 85, WBC 14.1, Folate 3.4  DRAIN/DEVICES: R CVC infusing argatroban,L arm PIV SL, R nare NJ with TF, R neph tube (pink/red output), chirinos cath  TELEMETRY RHYTHM: NSR  SKIN: Scattered bruising/scabs, left groin stick scab intact, bruised and excoriated scrotum, coccyx blanchable mepilex in place  TESTS/PROCEDURES: None scheduled  D/C DAY/GOALS/PLACE: Pending improvement and consults. Pt lives at home with wife   OTHER IMPORTANT INFO: Q24h Rocephin. Hematology and urology following. Contact precautions maintained.

## 2021-03-13 NOTE — PLAN OF CARE
PT/OT: Eval orders received, chart reviewed. Pt is total assist at baseline, use of aleks lift at home. No skilled PT/OT needs identified. Will complete orders

## 2021-03-13 NOTE — PROGRESS NOTES
[facility]    Hematology/Oncology Follow-up Note      Today's Date: 03/13/21  Date of Admission:  3/9/2021  Reason for Consult: thrombocytopenia      ASSESSMENT/PLAN:  Ron Gilmore is a 80 year old male with:    1) Thrombocytopenia: Maybe due to severe sepsis, antibiotics.  Platelet count on admission was 196K, trended down to 21K.  He did get subcutaneous heparin before it was stopped, so HIT Ab was sent, and he was started on argatroban trip.  Peripheral smear sent and pending.    -HIT Ab is negative, so okay to stop argatroban drip.      Platelet count improved slightly to 24K today.      -Continue to monitor daily CBC.  If there is no improvement or worsening of platelet count despite clinical improvement of sepsis/infection, then may consider bone marrow biopsy.     2) Anemia: mild, likely due to his acute illness and dilutional.    -monitor     3) Severe sepsis secondary to right-sided UVJ junction stone with ureterolithiasis with hydronephrosis s/p right sided percutaneous nephrostomy tube placement by IR, E.coli bacteremia.  -on ceftriaxone  -as per hospitalist and urology services     4) COPD on supplemental oxygen  -as per hospitalist        INTERIM HISTORY: Patient was seen in his room.  He says that he feels okay.       MEDICATIONS:  Current Facility-Administered Medications   Medication     acetaminophen (TYLENOL) tablet 650 mg     allopurinol (ZYLOPRIM) tablet 300 mg     argatroban (ACOVA) 1 mg/mL ANTICOAGULANT infusion     artificial saliva (BIOTENE MT) spray 1 spray     atorvastatin (LIPITOR) tablet 10 mg     bisacodyl (DULCOLAX) Suppository 10 mg     cefTRIAXone (ROCEPHIN) 2 g vial to attach to  ml bag for ADULTS or NS 50 ml bag for PEDS     dextrose 10% infusion     glucose gel 15-30 g    Or     dextrose 50 % injection 25-50 mL    Or     glucagon injection 1 mg     ipratropium - albuterol 0.5 mg/2.5 mg/3 mL (DUONEB) neb solution 3 mL     lidocaine (LMX4) cream      melatonin tablet 3 mg     metoprolol (LOPRESSOR) suspension 12.5 mg     naloxone (NARCAN) injection 0.2 mg    Or     naloxone (NARCAN) injection 0.4 mg    Or     naloxone (NARCAN) injection 0.2 mg    Or     naloxone (NARCAN) injection 0.4 mg     nitroGLYcerin (NITROSTAT) sublingual tablet 0.4 mg     ondansetron (ZOFRAN-ODT) ODT tab 4 mg    Or     ondansetron (ZOFRAN) injection 4 mg     PARoxetine (PAXIL) tablet 20 mg     Patient is already receiving anticoagulation with heparin, enoxaparin (LOVENOX), warfarin (COUMADIN)  or other anticoagulant medication     Patient is already receiving mechanical prophylaxis     polyethylene glycol (MIRALAX) Packet 17 g     senna-docusate (SENOKOT-S/PERICOLACE) 8.6-50 MG per tablet 1 tablet     sodium chloride (PF) 0.9% PF flush 10 mL     sodium chloride (PF) 0.9% PF flush 3 mL     sodium chloride (PF) 0.9% PF flush 3 mL           ALLERGIES:  No Known Allergies      PHYSICAL EXAM:  Vital signs:  Temp: 98.8  F (37.1  C) Temp src: Oral BP: 110/64 Pulse: 104   Resp: 20 SpO2: 95 % O2 Device: Nasal cannula Oxygen Delivery: 4 LPM Height: 182.9 cm (6') Weight: 115.5 kg (254 lb 10.1 oz)  Estimated body mass index is 34.53 kg/m  as calculated from the following:    Height as of this encounter: 1.829 m (6').    Weight as of this encounter: 115.5 kg (254 lb 10.1 oz).    GENERAL/CONSTITUTIONAL: No acute distress.  NEUROLOGIC: Alert, answers questions appropriately.  INTEGUMENTARY: No jaundice.      LABS:  CBC RESULTS:   Recent Labs   Lab Test 03/13/21  0550   WBC 9.8   RBC 3.89*   HGB 10.8*   HCT 35.9*   MCV 92   MCH 27.8   MCHC 30.1*   RDW 15.9*   PLT 24*       Recent Labs   Lab Test 03/12/21  1115 03/12/21  0418 03/11/21  0628   NA  --  145* 144   POTASSIUM 3.7 3.3* 4.0   CHLORIDE  --  113* 113*   CO2  --  29 24   ANIONGAP  --  3 7   GLC  --  93 109*   BUN  --  29 26   CR  --  0.81 0.95   RAJ  --  7.9* 7.8*         Shadia Bee MD  Hematology/Oncology  [facility]

## 2021-03-13 NOTE — CONSULTS
"Completing/clearing obsolete \"Social Work IP Consult\" and replacing with \"Care Management / Social Work IP Consult\" so that patient is seen by CM-RN / CM-SW team.   "

## 2021-03-13 NOTE — PROGRESS NOTES
[facility]    Hospitalist Progress Note    Date of Service (when I saw the patient): 03/13/2021    Assessment & Plan   Ron Gilmore is a 80 year old male who was admitted on 3/9/2021.  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 Coli.  Urology consultation requested patient underwent right sided percutaneous nephrostomy tube placement.  Patient was hypotensive and transferred to ICU was started on pressors.  He was initially on Zosyn and linezolid for history of VRE and with the E. coli being pansensitive was switched to IV ceftriaxone antibiotic currently.  He was also on heparin subcu for DVT prophylaxis.  And with the starting of heparin he is platelets gradually trended down.  Platelet count was 196 on admission on 3/9/2021 came down to 21K today.  HIT panel sent today.  Last dose of subcu heparin he received was yesterday morning at 630 since then it has been held and stopped today.     .  Severe sepsis with septic shock  secondary to right-sided UVJ stone with   hydronephrosis  Status post right-sided percutaneous nephrostomy tube placement by IR  E. coli bacteremia secondary to above;  A. fib RVR  Continue IV ceftriaxone for now  Patient is able to go off of pressors  Monitor hemodynamic status closely  Continue IV antibiotics while he is in the hospital with plan on discharging him on Ceftin twice daily   Per Urology cont right PCN for now, possible antegrade stent placement with IR once out of ICU and prior to discharge   -cont culture specific abx   -chronic  chirinos   -eventual cystoscopy with ureteroscopy and lithotripsy for stone removal in 2-3wks as outpatient   Lactic acidosis secondary to above normalized now  Atrial fibrillation in the setting of sepsis.  Currently converted to normal sinus rhythm and heart rate well controlled     Thrombocytopenia in the setting of severe sepsis  Patient was also on subcu heparin with which might have contributed to thrombocytopenia;  HIT panel sent  Patient is at high risk for thrombosis if he has HIT so he was started on argatroban drip for anticoagulation while his HIT panel is pending  Hematology consultation requested regarding further evaluation of pancytopenia as well  Continue to hold a regular aspirin while he is on argatroban drip  Platelets at 23-21-24K today         History of COPD on 3 to 4 L of oxygen by nasal cannula;  Currently stable  Continue duo nebs as needed for wheezing or shortness of breath     Hyperlipidemia;  Continue statin     Dysphagia;  Speech pathology had seen him and start him on puréed diet with honey thickened liquids diet to be advanced by speech path in the next 1 to 2 days  Stop tube feeds today and let him eat oral diet as per speech pathology     History of depression;  Continue paroxetine     History of CVA with chronic left-sided weakness;  Aspirin on hold as patient is on argatroban and with thrombocytopenia  PT OT consultations requested     CODE STATUS is DNR/DNI     Chirinos catheter in place for chronic urinary retention issues with BPH and history of CVA     DVT prophylaxis with PCD's and ambulation.  Hold off on any heparin products due to thrombocytopenia with possible HIT panel pending          Disposition: Expected discharge in  3-4days when OK with Urology and hematology if platelets are stable     Thais Wilson MD  [phone] (P)      Interval History   Resting comfortably in bed denies any shortness of breath or chest pain.  Hemodynamically stable.  Platelets stable at 24K  today.  HIT panel is still pending    -Data reviewed today: I reviewed all new labs and imaging results over the last 24 hours. I personally reviewed no images or EKG's today.    Physical Exam   Temp: 98.8  F (37.1  C) Temp src: Oral BP: 110/64 Pulse: 104   Resp: 20 SpO2: 95 % O2 Device: Nasal cannula Oxygen Delivery: 4 LPM  Vitals:    03/10/21 0300 03/11/21 0043 03/12/21 0600   Weight: 118.5 kg (261 lb 3.9 oz) 117.7 kg (259 lb 7.7 oz) 115.5 kg (254 lb 10.1 oz)     Vital Signs with Ranges  Temp:  [97.4  F (36.3  C)-100.2  F (37.9  C)] 98.8  F (37.1  C)  Pulse:  [] 104  Resp:  [11-20] 20  BP: ()/(57-68) 110/64  SpO2:  [93 %-100 %] 95 %  I/O last 3 completed shifts:  In: 1803 [P.O.:220; I.V.:53; NG/GT:1040]  Out: 2827 [Urine:2827]    Constitutional: Awake, alert, cooperative, no apparent distress  Respiratory: Clear to auscultation bilaterally, no crackles or wheezing  Cardiovascular: Regular rate and rhythm, normal S1 and S2, and no murmur noted  GI: Normal bowel sounds, soft, non-distended, non-tender.  Right-sided percutaneous nephrostomy tube and chronic indwelling catheter are in place  Skin/Integumen: No rashes, no cyanosis, no edema  Other:     Medications     argatroban 0.5 mcg/kg/min (03/13/21 1018)     dextrose       - MEDICATION INSTRUCTIONS -       - MEDICATION INSTRUCTIONS -         acetaminophen  650 mg Oral Q6H     allopurinol  300 mg Oral QPM     atorvastatin  10 mg Oral QPM     cefTRIAXone  2 g Intravenous Q24H     metoprolol  12.5 mg Oral or Feeding Tube BID     PARoxetine  20 mg Oral QPM     senna-docusate  1 tablet Oral At Bedtime     sodium chloride (PF)  10 mL Irrigation Q24H       Data   Recent Labs   Lab 03/13/21  0550 03/12/21  1128 03/12/21  1115 03/12/21  0418 03/11/21  0628 03/10/21  1420 03/10/21  0621 03/09/21  0346 03/09/21  0346   WBC 9.8 14.1*  --  13.9* 25.0*  --  30.6*   < > 9.7   HGB 10.8* 11.5*  --  10.7* 11.6*  --  12.4*   < > 13.4   MCV 92 92  --  93 92  --  94   < >  94   PLT 24* 21*  --  23* 40*  --  88*   < > 196   INR  --  1.20*  --   --   --  1.58*  --   --   --    NA  --   --   --  145* 144  --  143   < > 136   POTASSIUM  --   --  3.7 3.3* 4.0  --  4.1   < > 4.6   CHLORIDE  --   --   --  113* 113*  --  113*   < > 102   CO2  --   --   --  29 24  --  17*   < > 30   BUN  --   --   --  29 26  --  26   < > 18   CR  --   --   --  0.81 0.95  --  1.24   < > 0.98   ANIONGAP  --   --   --  3 7  --  13   < > 4   RAJ  --   --   --  7.9* 7.8*  --  7.4*   < > 8.1*   GLC  --   --   --  93 109*  --  126*   < > 145*   ALBUMIN  --   --   --   --   --   --  1.7*  --  2.3*   PROTTOTAL  --   --   --   --   --   --  6.4*  --  7.5   BILITOTAL  --   --   --   --   --   --  1.2  --  1.1   ALKPHOS  --   --   --   --   --   --  67  --  68   ALT  --   --   --   --   --   --  25  --  13   AST  --   --   --   --   --   --  51*  --  14    < > = values in this interval not displayed.       No results found for this or any previous visit (from the past 24 hour(s)).

## 2021-03-13 NOTE — ED NOTES
"I performed an assessment with Liv BLACK, the off going nurse. Pt noted to have a left facial droop but able to puff cheeks. His speech is slurred and pt noted his speech to be thicker than his usual. This is unchanged from night shift. Pt also stated his face felt \"fat\" on the left side. Otherwise, sensation to extremities is WNL.   " Abdominal Pain, N/V/D

## 2021-03-13 NOTE — PLAN OF CARE
Summary: Urosepsis  DATE & TIME: 3/12/2021  Cognitive Concerns/ Orientation : A&Ox3,    BEHAVIOR & AGGRESSION TOOL COLOR: Green  CIWA SCORE: NA   ABNL VS/O2: VSS on 4L O2 via NC  MOBILITY: Total care, turn/repos in bed. Baseline aleks lift at home.   PAIN MANAGMENT: Occ discomfort at NJ feeding tube site.   DIET: DD1 with honey thick liq, requires assist to eat, diet started today.   BOWEL/BLADDER: R neph tube patent, chronic chirinos cath. No BM.   ABNL LAB/BG: Plt 21, WBC 14.1. Lactic acid 1.6.  DRAIN/DEVICES: R neck central line, L arm PIV, R nare NJ FT, R neph tube, chirinos cath.   TELEMETRY RHYTHM: NSR  SKIN: Scattered bruising/scabs, left groin stick scab, bruised scrotum, dry skin.  TESTS/PROCEDURES: NA  D/C DAY/GOALS/PLACE: Pend progress.   OTHER IMPORTANT INFO: Transfer from ICU, started on Argatroban gtt, keep at current dose, next PTT in AM, Heme following. Rocephin for UTI, s/p R neph tube placement on 3/9, patent, good output, Urology following. FT at 50cc/hr, increase at 10pm to 70 ml/hr (goal rate). Step-dtr at bedside assisting with cares, reports pt lives at home with wife.

## 2021-03-14 ENCOUNTER — APPOINTMENT (OUTPATIENT)
Dept: SPEECH THERAPY | Facility: CLINIC | Age: 81
DRG: 871 | End: 2021-03-14
Payer: COMMERCIAL

## 2021-03-14 LAB
ANION GAP SERPL CALCULATED.3IONS-SCNC: <1 MMOL/L (ref 3–14)
APTT PPP: 37 SEC (ref 22–37)
BUN SERPL-MCNC: 32 MG/DL (ref 7–30)
CALCIUM SERPL-MCNC: 7.9 MG/DL (ref 8.5–10.1)
CHLORIDE SERPL-SCNC: 114 MMOL/L (ref 94–109)
CO2 SERPL-SCNC: 36 MMOL/L (ref 20–32)
CREAT SERPL-MCNC: 0.68 MG/DL (ref 0.66–1.25)
ERYTHROCYTE [DISTWIDTH] IN BLOOD BY AUTOMATED COUNT: 16.1 % (ref 10–15)
GFR SERPL CREATININE-BSD FRML MDRD: >90 ML/MIN/{1.73_M2}
GLUCOSE SERPL-MCNC: 103 MG/DL (ref 70–99)
HCT VFR BLD AUTO: 36.2 % (ref 40–53)
HGB BLD-MCNC: 10.6 G/DL (ref 13.3–17.7)
MCH RBC QN AUTO: 26.8 PG (ref 26.5–33)
MCHC RBC AUTO-ENTMCNC: 29.3 G/DL (ref 31.5–36.5)
MCV RBC AUTO: 92 FL (ref 78–100)
PLATELET # BLD AUTO: 41 10E9/L (ref 150–450)
POTASSIUM SERPL-SCNC: 3.7 MMOL/L (ref 3.4–5.3)
RBC # BLD AUTO: 3.95 10E12/L (ref 4.4–5.9)
SODIUM SERPL-SCNC: 148 MMOL/L (ref 133–144)
WBC # BLD AUTO: 6.4 10E9/L (ref 4–11)

## 2021-03-14 PROCEDURE — 92526 ORAL FUNCTION THERAPY: CPT | Mod: GN | Performed by: SPEECH-LANGUAGE PATHOLOGIST

## 2021-03-14 PROCEDURE — 85027 COMPLETE CBC AUTOMATED: CPT | Performed by: INTERNAL MEDICINE

## 2021-03-14 PROCEDURE — 80048 BASIC METABOLIC PNL TOTAL CA: CPT | Performed by: INTERNAL MEDICINE

## 2021-03-14 PROCEDURE — 250N000011 HC RX IP 250 OP 636: Performed by: INTERNAL MEDICINE

## 2021-03-14 PROCEDURE — 258N000003 HC RX IP 258 OP 636: Performed by: INTERNAL MEDICINE

## 2021-03-14 PROCEDURE — 250N000013 HC RX MED GY IP 250 OP 250 PS 637: Performed by: INTERNAL MEDICINE

## 2021-03-14 PROCEDURE — 99233 SBSQ HOSP IP/OBS HIGH 50: CPT | Performed by: INTERNAL MEDICINE

## 2021-03-14 PROCEDURE — 85730 THROMBOPLASTIN TIME PARTIAL: CPT | Performed by: INTERNAL MEDICINE

## 2021-03-14 PROCEDURE — 272N000078 HC NUTRITION PRODUCT INTERMEDIATE LITER

## 2021-03-14 PROCEDURE — 120N000001 HC R&B MED SURG/OB

## 2021-03-14 PROCEDURE — 99232 SBSQ HOSP IP/OBS MODERATE 35: CPT | Performed by: INTERNAL MEDICINE

## 2021-03-14 RX ORDER — DEXTROSE MONOHYDRATE 50 MG/ML
INJECTION, SOLUTION INTRAVENOUS CONTINUOUS
Status: ACTIVE | OUTPATIENT
Start: 2021-03-14 | End: 2021-03-14

## 2021-03-14 RX ORDER — DEXTROSE MONOHYDRATE, SODIUM CHLORIDE, AND POTASSIUM CHLORIDE 50; 1.49; 4.5 G/1000ML; G/1000ML; G/1000ML
INJECTION, SOLUTION INTRAVENOUS CONTINUOUS
Status: DISCONTINUED | OUTPATIENT
Start: 2021-03-14 | End: 2021-03-15

## 2021-03-14 RX ADMIN — DEXTROSE MONOHYDRATE: 50 INJECTION, SOLUTION INTRAVENOUS at 10:06

## 2021-03-14 RX ADMIN — Medication 12.5 MG: at 11:03

## 2021-03-14 RX ADMIN — ACETAMINOPHEN 650 MG: 325 TABLET, FILM COATED ORAL at 16:10

## 2021-03-14 RX ADMIN — CEFTRIAXONE SODIUM 2 G: 2 INJECTION, POWDER, FOR SOLUTION INTRAMUSCULAR; INTRAVENOUS at 12:35

## 2021-03-14 RX ADMIN — ACETAMINOPHEN 650 MG: 325 TABLET, FILM COATED ORAL at 09:51

## 2021-03-14 RX ADMIN — POTASSIUM CHLORIDE, DEXTROSE MONOHYDRATE AND SODIUM CHLORIDE: 150; 5; 450 INJECTION, SOLUTION INTRAVENOUS at 18:19

## 2021-03-14 RX ADMIN — ACETAMINOPHEN 650 MG: 325 TABLET, FILM COATED ORAL at 04:55

## 2021-03-14 ASSESSMENT — ACTIVITIES OF DAILY LIVING (ADL)
ADLS_ACUITY_SCORE: 26

## 2021-03-14 ASSESSMENT — MIFFLIN-ST. JEOR: SCORE: 1917

## 2021-03-14 NOTE — PLAN OF CARE
SLP - Swallow Tx update:  Pt demonstrated poor KRISSY despite mod-max cues during today's session.  RN reports po intake has been poor.  Very weak cough was observed on command.  Recommend return to NPO due to pt status today; if increased alertness and stable respiratory status observed, RN may give 1-2 tsps of honey thick liquids with precautions (see order).  Consider resuming TF given poor po today.  SLP to re-assess safety for return to a po diet on 3/15.  MD was text paged (with SLP personal phone reference) regarding above. Overall POC discussion may be indicated if safety for po intake and/or amount of intake does not improve.

## 2021-03-14 NOTE — PROGRESS NOTES
[facility]    Hospitalist Progress Note    Date of Service (when I saw the patient): 03/14/2021    Assessment & Plan   Ron Gilmore is a 80 year old male who was admitted on 3/9/2021.  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 Coli.  Urology consultation requested patient underwent right sided percutaneous nephrostomy tube placement.  Patient was hypotensive and transferred to ICU was started on pressors.  He was initially on Zosyn and linezolid for history of VRE and with the E. coli being pansensitive was switched to IV ceftriaxone antibiotic currently.  He was also on heparin subcu for DVT prophylaxis.  And with the starting of heparin he is platelets gradually trended down.  Platelet count was 196 on admission on 3/9/2021 came down to 21K today.  HIT panel sent today.  Last dose of subcu heparin he received was yesterday morning at 630 since then it has been held and stopped today.     .  Severe sepsis with septic shock  secondary to right-sided UVJ stone with   hydronephrosis  Status post right-sided percutaneous nephrostomy tube placement by IR  E. coli bacteremia secondary to above;  A. fib RVR  Continue IV ceftriaxone for now  Patient is able to go off of pressors  Monitor hemodynamic status closely  Continue IV antibiotics while he is in the hospital with plan on discharging him on Ceftin twice daily   Per Urology cont right PCN for now, possible antegrade stent placement with IR once out of ICU and prior to discharge   -cont culture specific abx   -chronic  chirinos   -eventual cystoscopy with ureteroscopy and lithotripsy for stone removal in 2-3wks as outpatient   Lactic acidosis secondary to above normalized now  Atrial fibrillation in the setting of sepsis.  Currently converted to normal sinus rhythm and heart rate well controlled     Thrombocytopenia in the setting of severe sepsis  Patient was also on subcu heparin with which might have contributed to thrombocytopenia;  HIT panel sent and returned negative . Stopped argatroban drip   Hematology consultation requested regarding further evaluation of pancytopenia as well  Continue to hold a regular aspirin for now with risk of bleeding   Platelets at 23-21-24K -41K today     Acute encephalopathy most likely metabolic and infectious causes secondary to sepsis and ICU stay and hypernatremia contributing:  Patient more lethargic and somnolent since 3/13/21  Head without contrast was done yesterday on 3/13/2021 showed no acute findings.  Chronic lacunar infarcts in the right periventricular parenchyma in the basal ganglia bilaterally.  Superimposed moderate dilatation chronic small vessel ischemic change and generalized mild volume loss    Hypernatremia;  Most likely due to free water deficit with him being on honey thickened liquids  Sodium went up from 1 45-1 48 today  Start him on D5W at 75 mill per hour for a total of 500 mL  Encourage oral fluid intake discussed with bedside RN          History of COPD on 3 to 4 L of oxygen by nasal cannula;  Currently stable  Continue duo nebs as needed for wheezing or shortness of breath     Hyperlipidemia;  Continue statin     Dysphagia;  Speech pathology had seen him and start him on puréed diet with honey thickened liquids diet to be advanced by speech path in the next 1 to 2 days  Stopped tube feeds on 3/13/2021 and let him eat oral diet as per speech pathology  Getting tube water flushes 60 mL every 4 hours while NG tube in place     History of depression;  Continue  paroxetine     History of CVA with chronic left-sided weakness;  Aspirin on hold as patient is on argatroban and with thrombocytopenia  PT OT consultations requested     CODE STATUS is DNR/DNI     Cardona catheter in place for chronic urinary retention issues with BPH and history of CVA     DVT prophylaxis with PCD's and ambulation.  Hold off on any heparin products due to thrombocytopenia with possible HIT panel pending          Disposition: Expected discharge in  3-4days when OK with Urology and hematology if platelets are stable     Greater than 30 minutes were spent in taking care of him today including reviewing the chart, collaboration of care  Discussed with bedside RN and patient today      Thais Wilson MD  [phone] (P)      Interval History   Resting comfortably in bed denies any shortness of breath or chest pain.  Hemodynamically stable.  Platelets stable at 41K today.  Intermittent somnolence and lethargy.  Head CT repeat on 3/13/2021 showed no acute findings.  The patient is alert oriented x3 was able to tell me that he is in the hospital eighth March and 2021    -Data reviewed today: I reviewed all new labs and imaging results over the last 24 hours. I personally reviewed no images or EKG's today.    Physical Exam   Temp: 97.7  F (36.5  C) Temp src: Oral BP: (!) 144/86 Pulse: 89   Resp: 18 SpO2: 97 % O2 Device: Nasal cannula Oxygen Delivery: 2 LPM  Vitals:    03/11/21 0043 03/12/21 0600 03/14/21 0630   Weight: 117.7 kg (259 lb 7.7 oz) 115.5 kg (254 lb 10.1 oz) 115.9 kg (255 lb 8.2 oz)     Vital Signs with Ranges  Temp:  [97.7  F (36.5  C)-98.1  F (36.7  C)] 97.7  F (36.5  C)  Pulse:  [] 89  Resp:  [16-18] 18  BP: ()/(56-86) 144/86  SpO2:  [95 %-98 %] 97 %  I/O last 3 completed shifts:  In: 420 [P.O.:240; NG/GT:180]  Out: 1575 [Urine:1575]    Constitutional: Intermittent somnolence, responds to verbal stimuli.  Oriented x3 on questioning  Respiratory: Clear to auscultation bilaterally, no  crackles or wheezing  Cardiovascular: Regular rate and rhythm, normal S1 and S2, and no murmur noted  GI: Normal bowel sounds, soft, non-distended, non-tender.  Right-sided percutaneous nephrostomy tube and chronic indwelling catheter are in place  Skin/Integumen: No rashes, no cyanosis, no edema  Other:     Medications     dextrose       D5W 75 mL/hr at 03/14/21 1006     - MEDICATION INSTRUCTIONS -       - MEDICATION INSTRUCTIONS -         acetaminophen  650 mg Oral Q6H     allopurinol  300 mg Oral QPM     atorvastatin  10 mg Oral QPM     cefTRIAXone  2 g Intravenous Q24H     metoprolol  12.5 mg Oral or Feeding Tube BID     PARoxetine  20 mg Oral QPM     senna-docusate  1 tablet Oral At Bedtime     sodium chloride (PF)  10 mL Irrigation Q24H       Data   Recent Labs   Lab 03/14/21  0634 03/13/21  0550 03/12/21  1128 03/12/21  1115 03/12/21  0418 03/11/21  0628 03/10/21  1420 03/10/21  0621 03/09/21  0346 03/09/21  0346   WBC 6.4 9.8 14.1*  --  13.9* 25.0*  --  30.6*   < > 9.7   HGB 10.6* 10.8* 11.5*  --  10.7* 11.6*  --  12.4*   < > 13.4   MCV 92 92 92  --  93 92  --  94   < > 94   PLT 41* 24* 21*  --  23* 40*  --  88*   < > 196   INR  --   --  1.20*  --   --   --  1.58*  --   --   --    *  --   --   --  145* 144  --  143   < > 136   POTASSIUM 3.7  --   --  3.7 3.3* 4.0  --  4.1   < > 4.6   CHLORIDE 114*  --   --   --  113* 113*  --  113*   < > 102   CO2 36*  --   --   --  29 24  --  17*   < > 30   BUN 32*  --   --   --  29 26  --  26   < > 18   CR 0.68  --   --   --  0.81 0.95  --  1.24   < > 0.98   ANIONGAP <1*  --   --   --  3 7  --  13   < > 4   RAJ 7.9*  --   --   --  7.9* 7.8*  --  7.4*   < > 8.1*   *  --   --   --  93 109*  --  126*   < > 145*   ALBUMIN  --   --   --   --   --   --   --  1.7*  --  2.3*   PROTTOTAL  --   --   --   --   --   --   --  6.4*  --  7.5   BILITOTAL  --   --   --   --   --   --   --  1.2  --  1.1   ALKPHOS  --   --   --   --   --   --   --  67  --  68   ALT  --   --   --    --   --   --   --  25  --  13   AST  --   --   --   --   --   --   --  51*  --  14    < > = values in this interval not displayed.       Recent Results (from the past 24 hour(s))   CT Head w/o Contrast    Narrative    CT OF THE HEAD WITHOUT CONTRAST   3/13/2021 5:46 PM     COMPARISON: Head CT 2/8/2021    HISTORY:  Somnolence and lethargy. Low platelet count.     TECHNIQUE:  Axial CT images of the head from the skull base to the  vertex were acquired without IV contrast.    FINDINGS:   INTRACRANIAL CONTENTS: No intracranial hemorrhage, extraaxial  collection, or mass effect.  No CT evidence of acute infarct. Chronic  infarcts in the right periventricular parenchyma and in the basal  ganglia bilaterally unchanged compared to the prior exam. Moderate  presumed chronic small vessel ischemic change and generalized volume  loss.    VISUALIZED ORBITS/SINUSES/MASTOIDS: No significant orbital  abnormality.  No significant paranasal sinus mucosal disease. No  significant middle ear or mastoid effusion.    OSSEOUS STRUCTURES/SOFT TISSUES: No significant abnormality.      Impression    IMPRESSION:  1.  No acute abnormality. No intracranial hemorrhage.  2.  Chronic lacunar infarcts in the right periventricular parenchyma  in the basal ganglia bilaterally.  3.  Superimposed moderate position chronic small vessel ischemic  change and generalized volume loss.    Radiation dose for this scan was reduced using automated exposure  control, adjustment of the mA and/or kV according to patient size, or  iterative reconstruction technique    TYRONE GUTIÉRREZ MD

## 2021-03-14 NOTE — PLAN OF CARE
Summary: Urosepsis secondary to R sided kidney stone  DATE & TIME: 3/14/2021 5493-5766  Cognitive Concerns/ Orientation : A&O x3, lethargic and cooperative. Speech garbed, slow. Pulls at lines at times.  BEHAVIOR & AGGRESSION TOOL COLOR: Green  ABNL VS/O2: VSS on 2L NC ex hypertension (144/86).  MOBILITY: Total, T&R Q2h. Baseline aleks lift at home. L sided hemiparesis, R eye droop (baseline)  PAIN MANAGMENT: Scheduled Tylenol  DIET:  NPO. Tube feed. If awake and alert, DD1 with honey thickened liquids with RN supervision. Speech and nutrition following. TF clamped per MD note. TF q4h 60mL water flush.  BOWEL/BLADDER: R neph tube patent, chirinos cath. 1 medium BM this shift  ABNL LAB/BG: Plt: 41, Na: 148, PTT 85, HgB: 10.6  DRAIN/DEVICES: R CVC infusing D5 75mL/h until 500mL, L arm PIV SL, R nare NJ with TF, R neph tube (pink/red output), chirinos cath  TELEMETRY RHYTHM: NSR  SKIN: Scattered bruising/scabs, left groin stick scab intact, bruised and excoriated scrotum, coccyx blanchable (mepilex applied). Wound cares completed this shift.  TESTS/PROCEDURES: CT completed for increased lethargy & low platelets yesterday, negative.   D/C DAY/GOALS/PLACE: Pending improvement and consults. Pt lives at home with wife   OTHER IMPORTANT INFO: Q24h Rocephin. Loose, congested, nonproductive cough. LS diminished with BLL coarse crackles at times. Hematology and urology following. Contact precautions maintained.

## 2021-03-14 NOTE — PROVIDER NOTIFICATION
Patient is on tube feeding but his Nj tube is clogged. Paged Dr Wilson. Dr Wilson called back and said to hold Tube feeding and that she will consult IR to unclog tomorrow.

## 2021-03-14 NOTE — PLAN OF CARE
"Summary: Urosepsis  DATE & TIME: 3/13/2021 1292-7146  Cognitive Concerns/ Orientation : A&O x4, lethargic this shift but is easily awaken. Calm & cooperative. Speech garbed, slow. Pulls at lines at times.   BEHAVIOR & AGGRESSION TOOL COLOR: Green  ABNL VS/O2: VSS on 4L NC, tachy at times ().  MOBILITY: Total, T&R Q2h (refuses at times). Baseline aleks lift at home. L sided hemiparesis, R eye droop (baseline)  PAIN MANAGMENT: 3-6/10 pain, \"all over my body,\" scheduled tylenol helpful.  DIET: DD1 with honey thickened liquids. Needs assistance. Meds crushed in apple sauce. Speech and nutrition following. TF clamped per MD note, encourage PO intake, tolerating. TF q4h 60mL water flush.  BOWEL/BLADDER: R neph tube patent, chirinos cath. 1 large BM this shift  ABNL LAB/BG: Plt 24, PTT 85, WBC 14.1. HIT negative. Lactic fired, 1.1.  DRAIN/DEVICES: R CVC SL, L arm PIV SL, R nare NJ with TF, R neph tube (pink/red output), chirinos cath  TELEMETRY RHYTHM: SR w/ BBB  SKIN: Scattered bruising/scabs, left groin stick scab intact, bruised and excoriated scrotum, coccyx blanchable (mepilex applied). Wound cares completed this shift.  TESTS/PROCEDURES: CT completed for increased lethargy & low platelets this shift, negative.   D/C DAY/GOALS/PLACE: Pending improvement and consults. Pt lives at home with wife   OTHER IMPORTANT INFO: Q24h Rocephin. Loose, congested, nonproductive cough. LS diminished with BLL coarse crackles at times. Hematology and urology following. HIT negative, argatroban discontinued this shift. Contact precautions maintained.  "

## 2021-03-14 NOTE — CONSULTS
CLINICAL NUTRITION SERVICES - REASSESSMENT NOTE    Consult received - Registered Dietitian to order TF per Medical Nutrition Therapy Guidelines    Recommendations Ordered by Registered Dietitian (RD):   Isosource 1.5 at 70 mL/hr to provide:  2520 kcal (28 kcal/kg), 114 g protein (1.3 g/kg), 296 g CHO, 25 g fiber, 1277 mL H2O  Flushes 200 mL every 4 hours (turn down to 60 ml q4 if IVF running)    Malnutrition: (3/11)   % Weight Loss:  Weight loss does not meet criteria for malnutrition  % Intake:  Unable to determine without a nutrition history   Subcutaneous Fat Loss:  None observed  Muscle Loss:  None observed  Fluid Retention:  Mild as above      Malnutrition Diagnosis: Unable to determine due to inability to obtain a nutrition history      EVALUATION OF PROGRESS TOWARD GOALS   Diet: NPO - per SLP  Noted diet advancement on 3/12, now regressed due to dysphagia.     Nutrition Support: TF started on 3/11, reached goal on 3/12. Discontinued on 3/13 with diet advancement.     Intake/Tolerance:   - No TF currently running, NPO since . Noted pt did tolerate % of several meals from 3/12-3/14.   - Na 148 (H) - likely r/t free water deficit.   - BM x1 today, x3 yesterday. Senokot scheduled.   - Weight trendin.9 kg today. Down 2.6 kg from 3/10.   - Medications reviewed: D5 IVF off this afternoon.     ASSESSED NUTRITION NEEDS:  Dosing Weight 90 kg (adjusted)  Estimated Energy Needs: 7913-3519 kcals (25-30 Kcal/Kg)  Justification: obese  Estimated Protein Needs: 110-135 grams protein (1.2-1.5 g pro/Kg)  Justification: hypercatabolism with acute illness  Estimated Fluid Needs: 3727-7504 mL (1 mL/Kcal)  Justification: maintenance    NEW FINDINGS:   - Pt c/o pain all over body    Previous Goals:   Isosource 1.5 at 70 mL/hr will meet % needs   Evaluation: Not met - off since yesterday, though patient was tolerating PO    Previous Nutrition Diagnosis:   Inadequate protein-energy intake related to NPO with  plans to start TF today as evidenced by meeting 0% protein and 11% energy needs from D5 IVF   Evaluation: Improving but continues- see below    CURRENT NUTRITION DIAGNOSIS  Inadequate protein-energy intake related to difficulty swallowing as evidenced by diet regression to NPO and no TF running since yesterday, D5 IVF off as of this afternoon and request to restart TF.     INTERVENTIONS  Recommendations / Nutrition Prescription  Isosource 1.5 at 70 mL/hr to provide:  2520 kcal (28 kcal/kg), 114 g protein (1.3 g/kg), 296 g CHO, 25 g fiber, 1277 mL H2O  Flushes 200 mL every 4 hours     Implementation  EN Composition, EN Schedule, Feeding Tube Flush: as above  Collaboration and Referral of Nutrition care: discusses TF restart with bedside RN.     Goals  TF @ goal to provide % estimated needs while NPO.       MONITORING AND EVALUATION:  Progress towards goals will be monitored and evaluated per protocol and Practice Guidelines    Debbie Harper RD, LD  [facility] [place], [address],    Pager: [phone]  Weekend Pager: [phone]

## 2021-03-14 NOTE — PROVIDER NOTIFICATION
Notified Person: Dietician     Notified Person Name: On-Call    Notification Date/Time: 3/14/21 1346    Notification Interaction: SLP and MD request tube feeding for patient and ordered NPO. Please advise.    Purpose of Notification:    Orders Received:    Comments

## 2021-03-14 NOTE — PLAN OF CARE
"Summary: Urosepsis  DATE & TIME: 3/14/2021 7600-1041  Cognitive Concerns/ Orientation : A&O x2-3, restless, & cooperative. Speech garbed, slow. Pulls at lines at times.   BEHAVIOR & AGGRESSION TOOL COLOR: Green  ABNL VS/O2: VSS on 2L NC, tachy at times ().  MOBILITY: Total, T&R Q2h (refuses at times). Baseline aleks lift at home. L sided hemiparesis, R eye droop (baseline)  PAIN MANAGMENT: 3-6/10 pain, \"all over my body,\" scheduled tylenol helpful.  DIET: DD1 with honey thickened liquids. Needs assistance. Meds crushed in apple sauce. Speech and nutrition following. TF clamped per MD note, encourage PO intake, tolerating. TF q4h 60mL water flush.  BOWEL/BLADDER: R neph tube patent, chirinos cath. 1 large BM this shift  ABNL LAB/BG: Plt 24, PTT 85, WBC 14.1. HIT negative.   DRAIN/DEVICES: R CVC SL, L arm PIV SL, R nare NJ with TF, R neph tube (pink/red output), chirinos cath  TELEMETRY RHYTHM: SR w/ BBB & occasional PVCs  SKIN: Scattered bruising/scabs, left groin stick scab intact, bruised and excoriated scrotum, coccyx blanchable (mepilex applied). Wound cares completed this shift.  TESTS/PROCEDURES: CT completed for increased lethargy & low platelets yesterday, negative.   D/C DAY/GOALS/PLACE: Pending improvement and consults. Pt lives at home with wife   OTHER IMPORTANT INFO: Q24h Rocephin. Loose, congested, nonproductive cough. LS diminished with BLL coarse crackles at times. Hematology and urology following. HIT negative, argatroban discontinued this shift. Contact precautions maintained.    "

## 2021-03-14 NOTE — PROGRESS NOTES
[facility]    Hematology/Oncology Follow-up Note      Today's Date: 03/14/21  Date of Admission:  3/9/2021  Reason for Consult: thrombocytopenia      ASSESSMENT/PLAN:  Ron Gilmore is a 80 year old male with:    1) Thrombocytopenia: Maybe due to severe sepsis, antibiotics.  Platelet count on admission was 196K, trended down to 21K.  He did get subcutaneous heparin before it was stopped, so HIT Ab was sent, and he was started on argatroban trip.  Peripheral smear sent and pending.     -HIT Ab is negative, so argatroban drip stopped yesterday.     Platelet count has improved to 41K today.      -Continue to monitor daily CBC.  If there is no improvement or worsening of platelet count despite clinical improvement of sepsis/infection, then may consider bone marrow biopsy.  Platelet count improving, so will continue to monitor for now.    -when platelet count is >50K, can resume DVT prophylaxis with heparin, as he is high risk for DVT.      2) Anemia: mild, likely due to his acute illness and dilutional.  Stable.  -monitor     3) Severe sepsis secondary to right-sided UVJ junction stone with ureterolithiasis with hydronephrosis s/p right sided percutaneous nephrostomy tube placement by IR, E.coli bacteremia.  -on ceftriaxone  -as per hospitalist and urology services     4) COPD on supplemental oxygen  -as per hospitalist        INTERIM HISTORY: Patient was seen in his room.  He says that he is doing okay.       MEDICATIONS:  Current Facility-Administered Medications   Medication     acetaminophen (TYLENOL) tablet 650 mg     allopurinol (ZYLOPRIM) tablet 300 mg     artificial saliva (BIOTENE MT) spray 1 spray     atorvastatin (LIPITOR) tablet 10 mg     bisacodyl (DULCOLAX) Suppository 10 mg     cefTRIAXone (ROCEPHIN) 2 g vial to attach to  ml bag for ADULTS or NS 50 ml bag for PEDS     dextrose 10% infusion     dextrose 5% infusion     glucose gel 15-30 g    Or     dextrose 50 % injection  25-50 mL    Or     glucagon injection 1 mg     ipratropium - albuterol 0.5 mg/2.5 mg/3 mL (DUONEB) neb solution 3 mL     lidocaine (LMX4) cream     melatonin tablet 3 mg     metoprolol (LOPRESSOR) suspension 12.5 mg     naloxone (NARCAN) injection 0.2 mg    Or     naloxone (NARCAN) injection 0.4 mg    Or     naloxone (NARCAN) injection 0.2 mg    Or     naloxone (NARCAN) injection 0.4 mg     nitroGLYcerin (NITROSTAT) sublingual tablet 0.4 mg     ondansetron (ZOFRAN-ODT) ODT tab 4 mg    Or     ondansetron (ZOFRAN) injection 4 mg     PARoxetine (PAXIL) tablet 20 mg     Patient is already receiving anticoagulation with heparin, enoxaparin (LOVENOX), warfarin (COUMADIN)  or other anticoagulant medication     Patient is already receiving mechanical prophylaxis     polyethylene glycol (MIRALAX) Packet 17 g     senna-docusate (SENOKOT-S/PERICOLACE) 8.6-50 MG per tablet 1 tablet     sodium chloride (PF) 0.9% PF flush 10 mL     sodium chloride (PF) 0.9% PF flush 3 mL     sodium chloride (PF) 0.9% PF flush 3 mL           ALLERGIES:  No Known Allergies      PHYSICAL EXAM:  Vital signs:  Temp: 97.7  F (36.5  C) Temp src: Oral BP: (!) 144/86 Pulse: 89   Resp: 18 SpO2: 97 % O2 Device: Nasal cannula Oxygen Delivery: 2 LPM Height: 182.9 cm (6') Weight: 115.9 kg (255 lb 8.2 oz)  Estimated body mass index is 34.65 kg/m  as calculated from the following:    Height as of this encounter: 1.829 m (6').    Weight as of this encounter: 115.9 kg (255 lb 8.2 oz).    GENERAL/CONSTITUTIONAL: No acute distress.      LABS:  CBC RESULTS:   Recent Labs   Lab Test 03/14/21  0634   WBC 6.4   RBC 3.95*   HGB 10.6*   HCT 36.2*   MCV 92   MCH 26.8   MCHC 29.3*   RDW 16.1*   PLT 41*       Recent Labs   Lab Test 03/14/21  0634 03/12/21  1115 03/12/21  0418   *  --  145*   POTASSIUM 3.7 3.7 3.3*   CHLORIDE 114*  --  113*   CO2 36*  --  29   ANIONGAP <1*  --  3   *  --  93   BUN 32*  --  29   CR 0.68  --  0.81   RAJ 7.9*  --  7.9*          Shadia Bee MD  Hematology/Oncology  [facility]

## 2021-03-15 ENCOUNTER — APPOINTMENT (OUTPATIENT)
Dept: GENERAL RADIOLOGY | Facility: CLINIC | Age: 81
DRG: 871 | End: 2021-03-15
Attending: INTERNAL MEDICINE
Payer: COMMERCIAL

## 2021-03-15 ENCOUNTER — APPOINTMENT (OUTPATIENT)
Dept: SPEECH THERAPY | Facility: CLINIC | Age: 81
DRG: 871 | End: 2021-03-15
Payer: COMMERCIAL

## 2021-03-15 LAB
ANION GAP SERPL CALCULATED.3IONS-SCNC: <1 MMOL/L (ref 3–14)
APTT PPP: 32 SEC (ref 22–37)
BUN SERPL-MCNC: 23 MG/DL (ref 7–30)
CALCIUM SERPL-MCNC: 7.6 MG/DL (ref 8.5–10.1)
CHLORIDE SERPL-SCNC: 109 MMOL/L (ref 94–109)
CO2 SERPL-SCNC: 37 MMOL/L (ref 20–32)
CREAT SERPL-MCNC: 0.65 MG/DL (ref 0.66–1.25)
ERYTHROCYTE [DISTWIDTH] IN BLOOD BY AUTOMATED COUNT: 15.9 % (ref 10–15)
GFR SERPL CREATININE-BSD FRML MDRD: >90 ML/MIN/{1.73_M2}
GLUCOSE BLDC GLUCOMTR-MCNC: 120 MG/DL (ref 70–99)
GLUCOSE BLDC GLUCOMTR-MCNC: 121 MG/DL (ref 70–99)
GLUCOSE SERPL-MCNC: 107 MG/DL (ref 70–99)
HCT VFR BLD AUTO: 35.9 % (ref 40–53)
HGB BLD-MCNC: 10.7 G/DL (ref 13.3–17.7)
MAGNESIUM SERPL-MCNC: 1.9 MG/DL (ref 1.6–2.3)
MCH RBC QN AUTO: 27.1 PG (ref 26.5–33)
MCHC RBC AUTO-ENTMCNC: 29.8 G/DL (ref 31.5–36.5)
MCV RBC AUTO: 91 FL (ref 78–100)
PHOSPHATE SERPL-MCNC: 1.6 MG/DL (ref 2.5–4.5)
PLATELET # BLD AUTO: 73 10E9/L (ref 150–450)
POTASSIUM SERPL-SCNC: 3.6 MMOL/L (ref 3.4–5.3)
RBC # BLD AUTO: 3.95 10E12/L (ref 4.4–5.9)
SODIUM SERPL-SCNC: 145 MMOL/L (ref 133–144)
WBC # BLD AUTO: 5.2 10E9/L (ref 4–11)

## 2021-03-15 PROCEDURE — 250N000013 HC RX MED GY IP 250 OP 250 PS 637: Performed by: INTERNAL MEDICINE

## 2021-03-15 PROCEDURE — 85027 COMPLETE CBC AUTOMATED: CPT | Performed by: INTERNAL MEDICINE

## 2021-03-15 PROCEDURE — 83735 ASSAY OF MAGNESIUM: CPT | Performed by: INTERNAL MEDICINE

## 2021-03-15 PROCEDURE — 250N000011 HC RX IP 250 OP 636: Performed by: INTERNAL MEDICINE

## 2021-03-15 PROCEDURE — 120N000001 HC R&B MED SURG/OB

## 2021-03-15 PROCEDURE — 272N000078 HC NUTRITION PRODUCT INTERMEDIATE LITER

## 2021-03-15 PROCEDURE — 80048 BASIC METABOLIC PNL TOTAL CA: CPT | Performed by: INTERNAL MEDICINE

## 2021-03-15 PROCEDURE — 84100 ASSAY OF PHOSPHORUS: CPT | Performed by: INTERNAL MEDICINE

## 2021-03-15 PROCEDURE — 85730 THROMBOPLASTIN TIME PARTIAL: CPT | Performed by: INTERNAL MEDICINE

## 2021-03-15 PROCEDURE — 44500 INTRO GASTROINTESTINAL TUBE: CPT

## 2021-03-15 PROCEDURE — 92526 ORAL FUNCTION THERAPY: CPT | Mod: GN

## 2021-03-15 PROCEDURE — 99233 SBSQ HOSP IP/OBS HIGH 50: CPT | Performed by: INTERNAL MEDICINE

## 2021-03-15 PROCEDURE — 272N000470 XR FEEDING TUBE PLACEMENT

## 2021-03-15 PROCEDURE — 999N001017 HC STATISTIC GLUCOSE BY METER IP

## 2021-03-15 PROCEDURE — 99232 SBSQ HOSP IP/OBS MODERATE 35: CPT | Performed by: INTERNAL MEDICINE

## 2021-03-15 PROCEDURE — 250N000009 HC RX 250: Performed by: INTERNAL MEDICINE

## 2021-03-15 RX ORDER — NYSTATIN 100000/ML
500000 SUSPENSION, ORAL (FINAL DOSE FORM) ORAL 4 TIMES DAILY
Status: DISCONTINUED | OUTPATIENT
Start: 2021-03-15 | End: 2021-03-22 | Stop reason: HOSPADM

## 2021-03-15 RX ORDER — LIDOCAINE HYDROCHLORIDE 20 MG/ML
JELLY TOPICAL ONCE
Status: COMPLETED | OUTPATIENT
Start: 2021-03-15 | End: 2021-03-15

## 2021-03-15 RX ORDER — FUROSEMIDE 10 MG/ML
20 INJECTION INTRAMUSCULAR; INTRAVENOUS ONCE
Status: COMPLETED | OUTPATIENT
Start: 2021-03-15 | End: 2021-03-15

## 2021-03-15 RX ADMIN — NYSTATIN 500000 UNITS: 100000 SUSPENSION ORAL at 18:12

## 2021-03-15 RX ADMIN — ACETAMINOPHEN 650 MG: 325 TABLET, FILM COATED ORAL at 21:20

## 2021-03-15 RX ADMIN — LIDOCAINE HYDROCHLORIDE 6 ML: 20 JELLY TOPICAL at 09:16

## 2021-03-15 RX ADMIN — ATORVASTATIN CALCIUM 10 MG: 10 TABLET, FILM COATED ORAL at 21:21

## 2021-03-15 RX ADMIN — POTASSIUM & SODIUM PHOSPHATES POWDER PACK 280-160-250 MG 2 PACKET: 280-160-250 PACK at 21:20

## 2021-03-15 RX ADMIN — Medication 12.5 MG: at 09:35

## 2021-03-15 RX ADMIN — POTASSIUM & SODIUM PHOSPHATES POWDER PACK 280-160-250 MG 2 PACKET: 280-160-250 PACK at 16:14

## 2021-03-15 RX ADMIN — ACETAMINOPHEN 650 MG: 325 TABLET, FILM COATED ORAL at 09:35

## 2021-03-15 RX ADMIN — FUROSEMIDE 20 MG: 10 INJECTION, SOLUTION INTRAVENOUS at 18:12

## 2021-03-15 RX ADMIN — SENNOSIDES AND DOCUSATE SODIUM 1 TABLET: 8.6; 5 TABLET ORAL at 21:21

## 2021-03-15 RX ADMIN — NYSTATIN 500000 UNITS: 100000 SUSPENSION ORAL at 21:20

## 2021-03-15 RX ADMIN — ACETAMINOPHEN 650 MG: 325 TABLET, FILM COATED ORAL at 16:14

## 2021-03-15 RX ADMIN — CEFTRIAXONE SODIUM 2 G: 2 INJECTION, POWDER, FOR SOLUTION INTRAMUSCULAR; INTRAVENOUS at 13:01

## 2021-03-15 RX ADMIN — ALLOPURINOL 300 MG: 300 TABLET ORAL at 21:21

## 2021-03-15 RX ADMIN — PAROXETINE HYDROCHLORIDE 20 MG: 20 TABLET, FILM COATED ORAL at 21:20

## 2021-03-15 ASSESSMENT — ACTIVITIES OF DAILY LIVING (ADL)
ADLS_ACUITY_SCORE: 25
ADLS_ACUITY_SCORE: 25
ADLS_ACUITY_SCORE: 26
ADLS_ACUITY_SCORE: 26
ADLS_ACUITY_SCORE: 24
ADLS_ACUITY_SCORE: 26

## 2021-03-15 NOTE — PROGRESS NOTES
Chart reviewed    Good output per nephrostomy tube, Sepsis appears to be resolved, labs normalizing, Platelets still low but appear to be improving.     Will wait for urology request to internalize the stent. Agree with waiting until platelets have normalized more.     Discussed with Valery RN care coordinator today.     Temp:  [97.8  F (36.6  C)-98  F (36.7  C)] 97.9  F (36.6  C)  Pulse:  [85-90] 90  Resp:  [18-20] 18  BP: (126-156)/(74-84) 156/84  SpO2:  [95 %-97 %] 97 %    Labs, notes, imaging reviewed    ROUTINE ICU LABS (Last four results)  CMP  Recent Labs   Lab 03/15/21  0605 03/14/21  0634 03/12/21  1115 03/12/21  0418 03/11/21  0628 03/10/21  0621 03/09/21  0346 03/09/21  0346   * 148*  --  145* 144 143   < > 136   POTASSIUM 3.6 3.7 3.7 3.3* 4.0 4.1   < > 4.6   CHLORIDE 109 114*  --  113* 113* 113*   < > 102   CO2 37* 36*  --  29 24 17*   < > 30   ANIONGAP <1* <1*  --  3 7 13   < > 4   * 103*  --  93 109* 126*   < > 145*   BUN 23 32*  --  29 26 26   < > 18   CR 0.65* 0.68  --  0.81 0.95 1.24   < > 0.98   GFRESTIMATED >90 >90  --  85 76 55*   < > 74   GFRESTBLACK >90 >90  --  >90 89 64   < > 85   RAJ 7.6* 7.9*  --  7.9* 7.8* 7.4*   < > 8.1*   MAG 1.9  --   --  2.0 2.1 1.7   < >  --    PHOS 1.6*  --   --   --  3.3 4.5  --   --    PROTTOTAL  --   --   --   --   --  6.4*  --  7.5   ALBUMIN  --   --   --   --   --  1.7*  --  2.3*   BILITOTAL  --   --   --   --   --  1.2  --  1.1   ALKPHOS  --   --   --   --   --  67  --  68   AST  --   --   --   --   --  51*  --  14   ALT  --   --   --   --   --  25  --  13    < > = values in this interval not displayed.     CBC  Recent Labs   Lab 03/15/21  0605 03/14/21  0634 03/13/21  0550 03/12/21  1128   WBC 5.2 6.4 9.8 14.1*   RBC 3.95* 3.95* 3.89* 4.18*   HGB 10.7* 10.6* 10.8* 11.5*   HCT 35.9* 36.2* 35.9* 38.5*   MCV 91 92 92 92   MCH 27.1 26.8 27.8 27.5   MCHC 29.8* 29.3* 30.1* 29.9*   RDW 15.9* 16.1* 15.9* 15.9*   PLT 73* 41* 24* 21*     INR  Recent Labs    Lab 03/12/21  1128 03/10/21  1420   INR 1.20* 1.58*     Arterial Blood Gas  Recent Labs   Lab 03/11/21  1935 03/11/21  1535 03/10/21  0740 03/09/21  1415   PH 7.29* 7.28* 7.22*  --    PCO2 59* 57* 45  --    PO2 161* 130* 184*  --    HCO3 29* 26 18*  --    O2PER 12L 12L 15 L Rikki

## 2021-03-15 NOTE — PROVIDER NOTIFICATION
Paged Dr Wilson regarding pts oral thrust and sores in mouth.  Clarifying whether she wanted lasix given per earlier discussion.    Order received for lasix and nystatin.

## 2021-03-15 NOTE — PROGRESS NOTES
"[facility]    Urology Progress Note     Assessment & Plan     Ron Gilmore is a 80 year old male who was admitted on 3/9/2021. Urology following for distal right ureteral calculus, urosepsis; s/p right PCN placement by IR on 3/9; scrotal ecchymosis with thrombocytopenia. Right PCN draining allan UOP.       1. Distal right ureteral calculus and urosepsis   -cont right PCN for now, possible antegrade stent placement with IR once thrombocytopenia has been addressed   -cont culture specific abx per IM  - continue chronic chirinos   -eventual cystoscopy with ureteroscopy and lithotripsy for stone removal 2-3wks after antegrade stent placement as outpatient (has seen Dr. Sullivan in the past)     2. Scrotal ecchymosis  -no concerns for abscess or infection  -likely related to thrombocytopenia  -observe    Michael Rizzo PA-C 3/15/2021 10:22 AM  [facility]  Mon-Fri, 7am - 4pm  Office: [phone]      Interval History   \"no idea what i'm doing here\". No flank pain. No groin pain. No complaints regarding catheter/or voiding.    UOP has been clear allan in the chirinos. Same with nephrostomy tube.    Platelets have improved slightly.     Physical Exam   Temp: 97.9  F (36.6  C) Temp src: Oral BP: (!) 156/84 Pulse: 90   Resp: 18 SpO2: 97 % O2 Device: Nasal cannula Oxygen Delivery: 2 LPM  Vitals:    21 0043 21 0600 21 0630   Weight: 117.7 kg (259 lb 7.7 oz) 115.5 kg (254 lb 10.1 oz) 115.9 kg (255 lb 8.2 oz)     Vital Signs with Ranges  Temp:  [97.8  F (36.6  C)-98  F (36.7  C)] 97.9  F (36.6  C)  Pulse:  [85-90] 90  Resp:  [18-20] 18  BP: (126-156)/(74-84) 156/84  SpO2:  [95 %-97 %] 97 %  I/O last 3 completed shifts:  In: 438 [P.O.:238; NG/GT:200]  Out:  [Urine:2025]    Temp (24hrs), Av.9  F (36.6  C), Min:97.8  F (36.6  C), Max:98  F (36.7  C)    GENERAL: Awake, alert, NAD. Sitting up in bed  NEURO: No facial asymmetry.  EYES: No icterus  HEAD, EARS, NOSE, MOUTH, " AND THROAT: Atraumatic, normocephalic  NECK: Symmetric  CARDIAC: Skin well perfused  RESPIRATORY: Breathing unlabored  ABDOMEN: Obese. NGT in place.  BACK/FLANKS: right PCNT draining clear allan urine  : Cardona in place draining clear allan urine. Mild scrotal ecchymosis but no edema or fluctuance  SKIN/HAIR/NAILS: No visible rashes  EXTREMITIES: No calf pain or LE edema  PSYCHIATRIC: Speech: normal Mood: normal Affect: normal        Medications     dextrose       dextrose 5% and 0.45% NaCl + KCl 20 mEq/L 75 mL/hr at 03/14/21 1819     - MEDICATION INSTRUCTIONS -       - MEDICATION INSTRUCTIONS -         acetaminophen  650 mg Oral Q6H     allopurinol  300 mg Oral QPM     atorvastatin  10 mg Oral QPM     cefTRIAXone  2 g Intravenous Q24H     metoprolol  12.5 mg Oral or Feeding Tube BID     PARoxetine  20 mg Oral QPM     senna-docusate  1 tablet Oral At Bedtime     sodium chloride (PF)  10 mL Irrigation Q24H       Data   Results for orders placed or performed during the hospital encounter of 03/09/21 (from the past 24 hour(s))   Nutrition Services Adult IP Consult    Narrative    Debbie Harper RD, LD     3/14/2021  2:41 PM  CLINICAL NUTRITION SERVICES - REASSESSMENT NOTE    Consult received - Registered Dietitian to order TF per Medical   Nutrition Therapy Guidelines    Recommendations Ordered by Registered Dietitian (RD):   Isosource 1.5 at 70 mL/hr to provide:  2520 kcal (28 kcal/kg),   114 g protein (1.3 g/kg), 296 g CHO, 25 g fiber, 1277 mL H2O  Flushes 200 mL every 4 hours (turn down to 60 ml q4 if IVF   running)    Malnutrition: (3/11)   % Weight Loss:  Weight loss does not meet criteria for   malnutrition  % Intake:  Unable to determine without a nutrition history   Subcutaneous Fat Loss:  None observed  Muscle Loss:  None observed  Fluid Retention:  Mild as above      Malnutrition Diagnosis: Unable to determine due to inability to   obtain a nutrition history      EVALUATION OF PROGRESS TOWARD GOALS   Diet:  NPO - per SLP  Noted diet advancement on 3/12, now regressed due to dysphagia.     Nutrition Support: TF started on 3/11, reached goal on 3/12.   Discontinued on 3/13 with diet advancement.     Intake/Tolerance:   - No TF currently running, NPO since . Noted pt did tolerate   % of several meals from 3/12-3/14.   - Na 148 (H) - likely r/t free water deficit.   - BM x1 today, x3 yesterday. Senokot scheduled.   - Weight trendin.9 kg today. Down 2.6 kg from 3/10.   - Medications reviewed: D5 IVF off this afternoon.     ASSESSED NUTRITION NEEDS:  Dosing Weight 90 kg (adjusted)  Estimated Energy Needs: 0421-6667 kcals (25-30 Kcal/Kg)  Justification: obese  Estimated Protein Needs: 110-135 grams protein (1.2-1.5 g pro/Kg)  Justification: hypercatabolism with acute illness  Estimated Fluid Needs: 9903-0851 mL (1 mL/Kcal)  Justification: maintenance    NEW FINDINGS:   - Pt c/o pain all over body    Previous Goals:   Isosource 1.5 at 70 mL/hr will meet % needs   Evaluation: Not met - off since yesterday, though patient was   tolerating PO    Previous Nutrition Diagnosis:   Inadequate protein-energy intake related to NPO with plans to   start TF today as evidenced by meeting 0% protein and 11% energy   needs from D5 IVF   Evaluation: Improving but continues- see below    CURRENT NUTRITION DIAGNOSIS  Inadequate protein-energy intake related to difficulty swallowing   as evidenced by diet regression to NPO and no TF running since   yesterday, D5 IVF off as of this afternoon and request to restart   TF.     INTERVENTIONS  Recommendations / Nutrition Prescription  Isosource 1.5 at 70 mL/hr to provide:  2520 kcal (28 kcal/kg),   114 g protein (1.3 g/kg), 296 g CHO, 25 g fiber, 1277 mL H2O  Flushes 200 mL every 4 hours     Implementation  EN Composition, EN Schedule, Feeding Tube Flush: as above  Collaboration and Referral of Nutrition care: discusses TF   restart with bedside RN.     Goals  TF @ goal to  provide % estimated needs while NPO.       MONITORING AND EVALUATION:  Progress towards goals will be monitored and evaluated per   protocol and Practice Guidelines    Debbie Harper RD, LD  [facility], [facility], [facility], [facility]   Pager: [phone]  Weekend Pager: [phone]       Magnesium   Result Value Ref Range    Magnesium 1.9 1.6 - 2.3 mg/dL   Phosphorus   Result Value Ref Range    Phosphorus 1.6 (L) 2.5 - 4.5 mg/dL   Partial thromboplastin time   Result Value Ref Range    PTT 32 22 - 37 sec   CBC with platelets   Result Value Ref Range    WBC 5.2 4.0 - 11.0 10e9/L    RBC Count 3.95 (L) 4.4 - 5.9 10e12/L    Hemoglobin 10.7 (L) 13.3 - 17.7 g/dL    Hematocrit 35.9 (L) 40.0 - 53.0 %    MCV 91 78 - 100 fl    MCH 27.1 26.5 - 33.0 pg    MCHC 29.8 (L) 31.5 - 36.5 g/dL    RDW 15.9 (H) 10.0 - 15.0 %    Platelet Count 73 (L) 150 - 450 10e9/L   Basic metabolic panel   Result Value Ref Range    Sodium 145 (H) 133 - 144 mmol/L    Potassium 3.6 3.4 - 5.3 mmol/L    Chloride 109 94 - 109 mmol/L    Carbon Dioxide 37 (H) 20 - 32 mmol/L    Anion Gap <1 (L) 3 - 14 mmol/L    Glucose 107 (H) 70 - 99 mg/dL    Urea Nitrogen 23 7 - 30 mg/dL    Creatinine 0.65 (L) 0.66 - 1.25 mg/dL    GFR Estimate >90 >60 mL/min/[1.73_m2]    GFR Estimate If Black >90 >60 mL/min/[1.73_m2]    Calcium 7.6 (L) 8.5 - 10.1 mg/dL

## 2021-03-15 NOTE — PLAN OF CARE
Summary: Urosepsis secondary to R sided kidney stone  DATE & TIME: 3/15/2021 8570-0373  Cognitive Concerns/ Orientation : Lethargic and arousal to voice. Oriented x 4. Speech garbled. 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  BEHAVIOR & AGGRESSION TOOL COLOR: Green  ABNL VS/O2: VSS on 2L NC ex hypertension (144/86).  MOBILITY: Total, T&R Q2h. Up with lift.  PAIN MANAGMENT: Scheduled Tylenol  DIET:  NPO. Tube feeding clamped/held per MD due to NJ tube clogged. IR to unclog tube tomorrow per MD. If fully alert and respiratory status stable, 1-2 tsp of honey thickened liquids by spoon with RN supervision/assist. Speech and nutrition following.   BOWEL/BLADDER: R neph tube patent with 575cc pink tinged output, chronic chirinos cath for retention, 500ml.   ABNL LAB/BG: Plt: 41, Na: 148, PTT 85, HgB: 10.6  DRAIN/DEVICES: R CVC infusing D5 with 1/2 NS@ 75ml/hr.  TELEMETRY RHYTHM: NSR  SKIN: Scattered bruising/scabs, left groin stick scab intact, bruised and excoriated scrotum, coccyx blanchable (mepilex applied). Wound cares completed this shift.  TESTS/PROCEDURES: CT completed 3/13 for increased lethargy & low platelets, negative.   D/C DAY/GOALS/PLACE: Pending improvement and consults. Pt lives at home with wife   OTHER IMPORTANT INFO: Q24h Rocephin. Loose, congested, nonproductive cough. LS coarse. Hematology and urology following. Contact precautions maintained.

## 2021-03-15 NOTE — PLAN OF CARE
Cognitive Concerns/ Orientation: Alert and oriented x4. Speech garbled. 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  BEHAVIOR & AGGRESSION TOOL COLOR: Green  ABNL VS/O2: VSS on 2L NC   MOBILITY: Total cares, T&R every 2 hours Up with lift.  PAIN MANAGMENT: Scheduled Tylenol  DIET:  DD 1 diet-refused to eat, requesting ice cream-unable to get him ice cream on DD1. Offered honey thick apple juice with crushed pills. Feeding assistance provided  BOWEL/BLADDER: R neph tube, chronic chirinos cath for retention. No BM  ABNL LAB/BG: Plt: 73, Na 145  DRAIN/DEVICES: R CVC saline locked  TELEMETRY RHYTHM: NSR  SKIN: Scattered bruising/scabs, left groin incision bruised and swollen.  Scabbed area on scrotum.  Coccyx wound unchanged-new dressing applied. Right neph tube dressing changed.  TESTS/PROCEDURES: none  D/C DAY/GOALS/PLACE: Pending improvement  OTHER IMPORTANT INFO: Loose, congested cough. Unable to cough up phlegm. Coarse lung sounds. Unable to use IS or breathing device. Receiving IV abx. Sleeping between cares

## 2021-03-15 NOTE — PROGRESS NOTES
[facility]    Hospitalist Progress Note    Date of Service (when I saw the patient): 03/15/2021    Assessment & Plan   Ron Gilmore is a 80 year old male who was admitted on 3/9/2021.  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 Coli.  Urology consultation requested patient underwent right sided percutaneous nephrostomy tube placement.  Patient was hypotensive and transferred to ICU was started on pressors.  He was initially on Zosyn and linezolid for history of VRE and with the E. coli being pansensitive was switched to IV ceftriaxone antibiotic currently.  He was also on heparin subcu for DVT prophylaxis.  And with the starting of heparin he is platelets gradually trended down.  Platelet count was 196 on admission on 3/9/2021 came down to 21K today.  HIT panel sent today.  Last dose of subcu heparin he received was yesterday morning at 630 since then it has been held and stopped today.     .  Severe sepsis with septic shock  secondary to right-sided UVJ stone with   hydronephrosis  Status post right-sided percutaneous nephrostomy tube placement by IR  E. coli bacteremia secondary to above;  A. fib RVR  Continue IV ceftriaxone for now  Patient is able to go off of pressors  Monitor hemodynamic status closely  Continue IV antibiotics while he is in the hospital with plan on discharging him on Ceftin twice daily   Per Urology cont right PCN for now, possible antegrade stent placement with IR once out of ICU and prior to discharge once platelets normalized   -cont culture  specific abx   -chronic chirinos   -eventual cystoscopy with ureteroscopy and lithotripsy for stone removal in 2-3wks as outpatient   Lactic acidosis secondary to above normalized now  Atrial fibrillation in the setting of sepsis.  Currently converted to normal sinus rhythm and heart rate well controlled     Thrombocytopenia in the setting of severe sepsis  Patient was also on subcu heparin with which might have contributed to thrombocytopenia;  HIT panel sent and returned negative . Stopped argatroban drip   Hematology consultation requested regarding further evaluation of pancytopenia as well  Continue to hold a regular aspirin for now with risk of bleeding   Platelets at 23-21-24K -41K-73K  today     Acute encephalopathy most likely metabolic and infectious causes secondary to sepsis and ICU stay and hypernatremia contributing:  Patient more lethargic and somnolent since 3/13/21  Head without contrast was done yesterday on 3/13/2021 showed no acute findings.  Chronic lacunar infarcts in the right periventricular parenchyma in the basal ganglia bilaterally.  Superimposed moderate dilatation chronic small vessel ischemic change and generalized mild volume loss  Mental status little better today     Hypernatremia;  Fluid overload   Most likely due to free water deficit with him being on honey thickened liquids  Sodium  1 45-1   today    Pt with coarse crackles so lasix 20mg IV once today      History of COPD on 3 to 4 L of oxygen by nasal cannula;  Currently stable  Continue duo nebs as needed for wheezing or shortness of breath     Hyperlipidemia;  Continue statin     Dysphagia;  Speech pathology had seen him and staredt him on puréed diet with honey thickened liquids diet to be advanced by speech path in the next 1 to 2 days  Continue  tube feeds for now until better PO intake     History of depression;  Continue paroxetine     History of CVA with chronic left-sided weakness;  Aspirin on hold as patient is on  argatroban and with thrombocytopenia  PT OT consultations requested     CODE STATUS is DNR/DNI     Cardona catheter in place for chronic urinary retention issues with BPH and history of CVA     DVT prophylaxis with PCD's and ambulation.  Hold off on any heparin products due to thrombocytopenia with possible HIT panel pending          Disposition: Expected discharge in  3-4days when OK with Urology and hematology if platelets are stable     Greater than 30 minutes were spent in taking care of him today including reviewing the chart, collaboration of care  Discussed with bedside RN and patient and his daughter today      Thais Wilson MD  [phone] (P)      Interval History      Resting comfortably in bed denies any shortness of breath or chest pain.  Hemodynamically stable.  .  Intermittent somnolence and lethargy.  Head CT repeat on 3/13/2021 showed no acute findings.  The patient is alert oriented x3 more awake today . NG tube was clogged and de clogged by radiology today . Tube feeds restarted today     -Data reviewed today: I reviewed all new labs and imaging results over the last 24 hours. I personally reviewed no images or EKG's today.    Physical Exam   Temp: 97.9  F (36.6  C) Temp src: Oral BP: (!) 156/84 Pulse: 90   Resp: 18 SpO2: 97 % O2 Device: Nasal cannula Oxygen Delivery: 2 LPM  Vitals:    03/11/21 0043 03/12/21 0600 03/14/21 0630   Weight: 117.7 kg (259 lb 7.7 oz) 115.5 kg (254 lb 10.1 oz) 115.9 kg (255 lb 8.2 oz)     Vital Signs with Ranges  Temp:  [97.8  F (36.6  C)-98  F (36.7  C)] 97.9  F (36.6  C)  Pulse:  [85-90] 90  Resp:  [18-20] 18  BP: (126-156)/(74-84) 156/84  SpO2:  [95 %-97 %] 97 %  I/O last 3 completed shifts:  In: 438 [P.O.:238; NG/GT:200]  Out: 2025 [Urine:2025]    Constitutional: More alert and awake , responds to verbal stimuli. Alert,  Oriented x3 on questioning  Respiratory: coarse crackles b/l   Cardiovascular: Regular rate and rhythm, normal S1 and S2, and no murmur noted  GI:  Normal bowel sounds, soft, non-distended, non-tender.  Right-sided percutaneous nephrostomy tube and chronic indwelling catheter are in place  Skin/Integumen: No rashes, no cyanosis, no edema  Other:     Medications     dextrose       - MEDICATION INSTRUCTIONS -       - MEDICATION INSTRUCTIONS -         acetaminophen  650 mg Oral Q6H     allopurinol  300 mg Oral QPM     atorvastatin  10 mg Oral QPM     cefTRIAXone  2 g Intravenous Q24H     metoprolol  12.5 mg Oral or Feeding Tube BID     PARoxetine  20 mg Oral QPM     potassium & sodium phosphates  2 packet Oral or Feeding Tube TID     senna-docusate  1 tablet Oral At Bedtime     sodium chloride (PF)  10 mL Irrigation Q24H       Data   Recent Labs   Lab 03/15/21  0605 03/14/21  0634 03/13/21  0550 03/12/21  1128 03/12/21  1115 03/12/21  0418 03/10/21  1420 03/10/21  1420 03/10/21  0621 03/09/21  0346 03/09/21  0346   WBC 5.2 6.4 9.8 14.1*  --  13.9*   < >  --  30.6*   < > 9.7   HGB 10.7* 10.6* 10.8* 11.5*  --  10.7*   < >  --  12.4*   < > 13.4   MCV 91 92 92 92  --  93   < >  --  94   < > 94   PLT 73* 41* 24* 21*  --  23*   < >  --  88*   < > 196   INR  --   --   --  1.20*  --   --   --  1.58*  --   --   --    * 148*  --   --   --  145*   < >  --  143   < > 136   POTASSIUM 3.6 3.7  --   --  3.7 3.3*   < >  --  4.1   < > 4.6   CHLORIDE 109 114*  --   --   --  113*   < >  --  113*   < > 102   CO2 37* 36*  --   --   --  29   < >  --  17*   < > 30   BUN 23 32*  --   --   --  29   < >  --  26   < > 18   CR 0.65* 0.68  --   --   --  0.81   < >  --  1.24   < > 0.98   ANIONGAP <1* <1*  --   --   --  3   < >  --  13   < > 4   RAJ 7.6* 7.9*  --   --   --  7.9*   < >  --  7.4*   < > 8.1*   * 103*  --   --   --  93   < >  --  126*   < > 145*   ALBUMIN  --   --   --   --   --   --   --   --  1.7*  --  2.3*   PROTTOTAL  --   --   --   --   --   --   --   --  6.4*  --  7.5   BILITOTAL  --   --   --   --   --   --   --   --  1.2  --  1.1   ALKPHOS  --   --   --   --    --   --   --   --  67  --  68   ALT  --   --   --   --   --   --   --   --  25  --  13   AST  --   --   --   --   --   --   --   --  51*  --  14    < > = values in this interval not displayed.       Recent Results (from the past 24 hour(s))   XR Feeding Tube Placement    Narrative    FEEDING TUBE PLACEMENT   3/15/2021 9:23 AM    HISTORY: Nutritional needs. Existing feeding tube coiled in the  stomach.    COMPARISON: None    FINDINGS: 1.9 minutes of fluoroscopy were utilized for placement of a  feeding tube. Existing feeding tube was retracted until there was no  coiling in the stomach. A wire was placed down the tube and the tube  was advanced to the ligament of Treitz.  25 mL of contrast was  injected to confirm placement.  The tube was marked at the level of  the nose at the 109 cm length.  Estimated blood loss during the  procedure was less than 5 mL. No specimens collected. There were no  complications of the procedure.    Medications used: red cuba 109cm, 6mL Lidocaine gel, 25mL Omnipaque  240    Images Obtained: 1      Impression    IMPRESSION: Successful feeding tube placement. Ready for use.         ERNIE FITZPATRICK PA-C

## 2021-03-15 NOTE — PLAN OF CARE
Summary: Urosepsis secondary to R sided kidney stone  DATE & TIME: 3/14/2021 3-11pm  Cognitive Concerns/ Orientation : Lethargic and arousal to voice. Oriented x 4. Speech garbled. 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  BEHAVIOR & AGGRESSION TOOL COLOR: Green  ABNL VS/O2: VSS on 2L NC ex hypertension (144/86).  MOBILITY: Total, T&R Q2h. Up with lift.  PAIN MANAGMENT: Scheduled Tylenol  DIET:  NPO. Tube feeding clamped/held per MD due to NJ tube clogged. IR to unclog tube tomorrow per MD. If fully alert and respiratory status stable, 1-2 tsp of honey thickened liquids by spoon with RN supervision/assist. Speech and nutrition following.   BOWEL/BLADDER: R neph tube patent with 300cc clear output, chronic chirinos cath for retention.   ABNL LAB/BG: Plt: 41, Na: 148, PTT 85, HgB: 10.6  DRAIN/DEVICES: R CVC infusing D5 with 1/2 NS@ 75ml/hr.  TELEMETRY RHYTHM: NSR  SKIN: Scattered bruising/scabs, left groin stick scab intact, bruised and excoriated scrotum, coccyx blanchable (mepilex applied). Wound cares completed this shift.  TESTS/PROCEDURES: CT completed for increased lethargy & low platelets yesterday, negative.   D/C DAY/GOALS/PLACE: Pending improvement and consults. Pt lives at home with wife   OTHER IMPORTANT INFO: Q24h Rocephin. Loose, congested, nonproductive cough. LS coarse. Hematology and urology following. Contact precautions maintained.

## 2021-03-16 ENCOUNTER — APPOINTMENT (OUTPATIENT)
Dept: INTERVENTIONAL RADIOLOGY/VASCULAR | Facility: CLINIC | Age: 81
DRG: 871 | End: 2021-03-16
Attending: PHYSICIAN ASSISTANT
Payer: COMMERCIAL

## 2021-03-16 ENCOUNTER — APPOINTMENT (OUTPATIENT)
Dept: SPEECH THERAPY | Facility: CLINIC | Age: 81
DRG: 871 | End: 2021-03-16
Payer: COMMERCIAL

## 2021-03-16 LAB
ANION GAP SERPL CALCULATED.3IONS-SCNC: <1 MMOL/L (ref 3–14)
APTT PPP: 31 SEC (ref 22–37)
BUN SERPL-MCNC: 21 MG/DL (ref 7–30)
CALCIUM SERPL-MCNC: 7.4 MG/DL (ref 8.5–10.1)
CHLORIDE SERPL-SCNC: 107 MMOL/L (ref 94–109)
CO2 SERPL-SCNC: 38 MMOL/L (ref 20–32)
COPATH REPORT: NORMAL
CREAT SERPL-MCNC: 0.62 MG/DL (ref 0.66–1.25)
ERYTHROCYTE [DISTWIDTH] IN BLOOD BY AUTOMATED COUNT: 16.2 % (ref 10–15)
GFR SERPL CREATININE-BSD FRML MDRD: >90 ML/MIN/{1.73_M2}
GLUCOSE BLDC GLUCOMTR-MCNC: 108 MG/DL (ref 70–99)
GLUCOSE SERPL-MCNC: 119 MG/DL (ref 70–99)
HCT VFR BLD AUTO: 33.8 % (ref 40–53)
HGB BLD-MCNC: 10.3 G/DL (ref 13.3–17.7)
MCH RBC QN AUTO: 28 PG (ref 26.5–33)
MCHC RBC AUTO-ENTMCNC: 30.5 G/DL (ref 31.5–36.5)
MCV RBC AUTO: 92 FL (ref 78–100)
PHOSPHATE SERPL-MCNC: 2.4 MG/DL (ref 2.5–4.5)
PHOSPHATE SERPL-MCNC: 2.9 MG/DL (ref 2.5–4.5)
PLATELET # BLD AUTO: 100 10E9/L (ref 150–450)
POTASSIUM SERPL-SCNC: 3.5 MMOL/L (ref 3.4–5.3)
RBC # BLD AUTO: 3.68 10E12/L (ref 4.4–5.9)
SODIUM SERPL-SCNC: 145 MMOL/L (ref 133–144)
WBC # BLD AUTO: 7.6 10E9/L (ref 4–11)

## 2021-03-16 PROCEDURE — 250N000013 HC RX MED GY IP 250 OP 250 PS 637: Performed by: INTERNAL MEDICINE

## 2021-03-16 PROCEDURE — 250N000011 HC RX IP 250 OP 636: Performed by: NURSE PRACTITIONER

## 2021-03-16 PROCEDURE — 99233 SBSQ HOSP IP/OBS HIGH 50: CPT | Performed by: INTERNAL MEDICINE

## 2021-03-16 PROCEDURE — 85027 COMPLETE CBC AUTOMATED: CPT | Performed by: INTERNAL MEDICINE

## 2021-03-16 PROCEDURE — C1769 GUIDE WIRE: HCPCS

## 2021-03-16 PROCEDURE — 250N000011 HC RX IP 250 OP 636: Performed by: INTERNAL MEDICINE

## 2021-03-16 PROCEDURE — C1758 CATHETER, URETERAL: HCPCS

## 2021-03-16 PROCEDURE — 80048 BASIC METABOLIC PNL TOTAL CA: CPT | Performed by: INTERNAL MEDICINE

## 2021-03-16 PROCEDURE — 272N000116 HC CATH CR1

## 2021-03-16 PROCEDURE — 99152 MOD SED SAME PHYS/QHP 5/>YRS: CPT

## 2021-03-16 PROCEDURE — 85730 THROMBOPLASTIN TIME PARTIAL: CPT | Performed by: INTERNAL MEDICINE

## 2021-03-16 PROCEDURE — 999N001017 HC STATISTIC GLUCOSE BY METER IP

## 2021-03-16 PROCEDURE — 120N000001 HC R&B MED SURG/OB

## 2021-03-16 PROCEDURE — C1729 CATH, DRAINAGE: HCPCS

## 2021-03-16 PROCEDURE — 250N000009 HC RX 250: Performed by: INTERNAL MEDICINE

## 2021-03-16 PROCEDURE — G0463 HOSPITAL OUTPT CLINIC VISIT: HCPCS

## 2021-03-16 PROCEDURE — 272N000567 HC SHEATH CR4

## 2021-03-16 PROCEDURE — 92526 ORAL FUNCTION THERAPY: CPT | Mod: GN | Performed by: SPEECH-LANGUAGE PATHOLOGIST

## 2021-03-16 PROCEDURE — 250N000009 HC RX 250: Performed by: NURSE PRACTITIONER

## 2021-03-16 PROCEDURE — 50693 PLMT URETERAL STENT PRQ: CPT

## 2021-03-16 PROCEDURE — 272N000078 HC NUTRITION PRODUCT INTERMEDIATE LITER

## 2021-03-16 PROCEDURE — 84100 ASSAY OF PHOSPHORUS: CPT | Performed by: INTERNAL MEDICINE

## 2021-03-16 RX ORDER — ASPIRIN 325 MG
325 TABLET ORAL DAILY
Status: DISCONTINUED | OUTPATIENT
Start: 2021-03-16 | End: 2021-03-16

## 2021-03-16 RX ORDER — FLUMAZENIL 0.1 MG/ML
0.2 INJECTION, SOLUTION INTRAVENOUS
Status: DISCONTINUED | OUTPATIENT
Start: 2021-03-16 | End: 2021-03-16

## 2021-03-16 RX ORDER — FENTANYL CITRATE 50 UG/ML
25-50 INJECTION, SOLUTION INTRAMUSCULAR; INTRAVENOUS EVERY 5 MIN PRN
Status: DISCONTINUED | OUTPATIENT
Start: 2021-03-16 | End: 2021-03-16

## 2021-03-16 RX ORDER — ASPIRIN 325 MG
325 TABLET ORAL DAILY
Status: DISCONTINUED | OUTPATIENT
Start: 2021-03-16 | End: 2021-03-22 | Stop reason: HOSPADM

## 2021-03-16 RX ORDER — NALOXONE HYDROCHLORIDE 0.4 MG/ML
0.2 INJECTION, SOLUTION INTRAMUSCULAR; INTRAVENOUS; SUBCUTANEOUS
Status: DISCONTINUED | OUTPATIENT
Start: 2021-03-16 | End: 2021-03-16

## 2021-03-16 RX ORDER — NALOXONE HYDROCHLORIDE 0.4 MG/ML
0.4 INJECTION, SOLUTION INTRAMUSCULAR; INTRAVENOUS; SUBCUTANEOUS
Status: DISCONTINUED | OUTPATIENT
Start: 2021-03-16 | End: 2021-03-16

## 2021-03-16 RX ADMIN — NYSTATIN 500000 UNITS: 100000 SUSPENSION ORAL at 08:42

## 2021-03-16 RX ADMIN — MIDAZOLAM HYDROCHLORIDE 1 MG: 1 INJECTION, SOLUTION INTRAMUSCULAR; INTRAVENOUS at 13:19

## 2021-03-16 RX ADMIN — ACETAMINOPHEN 650 MG: 325 TABLET, FILM COATED ORAL at 03:54

## 2021-03-16 RX ADMIN — Medication 12.5 MG: at 08:42

## 2021-03-16 RX ADMIN — PAROXETINE HYDROCHLORIDE 20 MG: 20 TABLET, FILM COATED ORAL at 21:07

## 2021-03-16 RX ADMIN — ALLOPURINOL 300 MG: 300 TABLET ORAL at 21:07

## 2021-03-16 RX ADMIN — Medication 12.5 MG: at 21:07

## 2021-03-16 RX ADMIN — ANTICOAGULANT CITRATE DEXTROSE SOLUTION FORMULA A 5 ML: 12.25; 11; 3.65 SOLUTION INTRAVENOUS at 17:16

## 2021-03-16 RX ADMIN — SENNOSIDES AND DOCUSATE SODIUM 1 TABLET: 8.6; 5 TABLET ORAL at 21:07

## 2021-03-16 RX ADMIN — ATORVASTATIN CALCIUM 10 MG: 10 TABLET, FILM COATED ORAL at 21:07

## 2021-03-16 RX ADMIN — LIDOCAINE HYDROCHLORIDE 6 ML: 10 INJECTION, SOLUTION INFILTRATION; PERINEURAL at 13:29

## 2021-03-16 RX ADMIN — FENTANYL CITRATE 50 MCG: 50 INJECTION, SOLUTION INTRAMUSCULAR; INTRAVENOUS at 13:19

## 2021-03-16 RX ADMIN — CEFTRIAXONE SODIUM 2 G: 2 INJECTION, POWDER, FOR SOLUTION INTRAMUSCULAR; INTRAVENOUS at 12:13

## 2021-03-16 RX ADMIN — NYSTATIN 500000 UNITS: 100000 SUSPENSION ORAL at 17:15

## 2021-03-16 RX ADMIN — ASPIRIN 325 MG ORAL TABLET 325 MG: 325 PILL ORAL at 14:04

## 2021-03-16 RX ADMIN — NYSTATIN 500000 UNITS: 100000 SUSPENSION ORAL at 21:07

## 2021-03-16 RX ADMIN — NYSTATIN 500000 UNITS: 100000 SUSPENSION ORAL at 12:13

## 2021-03-16 RX ADMIN — ANTICOAGULANT CITRATE DEXTROSE SOLUTION FORMULA A 10 ML: 12.25; 11; 3.65 SOLUTION INTRAVENOUS at 17:15

## 2021-03-16 RX ADMIN — POTASSIUM & SODIUM PHOSPHATES POWDER PACK 280-160-250 MG 2 PACKET: 280-160-250 PACK at 08:42

## 2021-03-16 ASSESSMENT — ACTIVITIES OF DAILY LIVING (ADL)
CONCENTRATING,_REMEMBERING_OR_MAKING_DECISIONS_DIFFICULTY: YES
WALKING_OR_CLIMBING_STAIRS: AMBULATION DIFFICULTY, DEPENDENT
ADLS_ACUITY_SCORE: 26
WALKING_OR_CLIMBING_STAIRS_DIFFICULTY: YES
ADLS_ACUITY_SCORE: 26
WEAR_GLASSES_OR_BLIND: NO
ADLS_ACUITY_SCORE: 26
TOILETING_ISSUES: YES
DIFFICULTY_COMMUNICATING: YES
ADLS_ACUITY_SCORE: 26
DRESSING/BATHING: BATHING DIFFICULTY, DEPENDENT
ADLS_ACUITY_SCORE: 24
DIFFICULTY_EATING/SWALLOWING: YES
EATING/SWALLOWING: SWALLOWING SOLID FOOD;SWALLOWING LIQUIDS;EATING
COMMUNICATION: DIFFICULTY SPEAKING
ADLS_ACUITY_SCORE: 26
DRESSING/BATHING_DIFFICULTY: YES

## 2021-03-16 ASSESSMENT — MIFFLIN-ST. JEOR: SCORE: 1945

## 2021-03-16 NOTE — PLAN OF CARE
Summary: Urosepsis secondary to R sided kidney stone  DATE & TIME: 3/16/2021 0810-7143  Cognitive Concerns/ Orientation: A&Ox3-4, disoriented to full situation, forgetful. Speech is garbled. 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  BEHAVIOR & AGGRESSION TOOL COLOR: Green  ABNL VS/O2: VSS on 2L NC (baseline)  MOBILITY: Total cares, T&R every 2 hours. Up with lift.   PAIN MANAGMENT: Scheduled Tylenol- declined this afternoon, denies pain.  DIET:  NPO until speech eval/video swallow 3/17 (speech did bedside eval and said ok for very little amounts of thickened liquid to keep mouth moist). Meds given through feeding tube. TF running at goal 70 ml/hr with 200 ml manual flushes q4hrs (last flush around 1600)  BOWEL/BLADDER: Chronic chirinos for retention. No BM this shift  ABNL LAB/BG: Platlets 100 (improving), Na 145, Phos recheck 2.9. Hgb 10.3  DRAIN/DEVICES: R CVC citrate locked  TELEMETRY RHYTHM: NSR  SKIN: sores on lips (bleeding occasionally, dry), frequent oral cares. Scattered bruising/scabs, left groin incision bruised. Scabbed area and ecchymosis on scrotum- WOC RN put a mepilex dressing on this, however no orders for this and their note does not reflect this intervention/change in plan of care... Coccyx wound- dressing changed, seems to be improving based on photos. +1 BLE edema/ +2 RUE edema; offloading boots on  TESTS/PROCEDURES: IR removed R. Neph tube today and place a ureteral stent, dressing CDI  D/C DAY/GOALS/PLACE: 3-4 days to TCU pending swallow eval and need for further nutrition. Urology signed off. Hem/onc following/monitoring but no new recommendations.   OTHER IMPORTANT INFO: Loose, congested cough. Coughs up thick yellow phlegm, frequent oral cares. Coarse lung sounds. Unable to use IS or breathing device without assistance. Continue IV abx.

## 2021-03-16 NOTE — PROGRESS NOTES
[facility] Progress Note  [facility]    Purpose of Visit: Length of Stay    I attempted 3x to visit patient: once they were absent of room, 2nd they were receiving cares and the 3rd, patient was sleeping.    Plan: [facility] remains available as needed and requested.      Juli Ontiveros   Intern

## 2021-03-16 NOTE — PROGRESS NOTES
[facility]    Urology Brief Note     Chart Reviewed.    Assessment & Plan  Ron Gilmore is a 80 year old male who was admitted on 3/9/2021. Urology following for distal right ureteral calculus, e. Coli urosepsis; s/p right PCN placement by IR on 3/9; scrotal ecchymosis with thrombocytopenia (improving).     1. Distal right ureteral calculus and urosepsis   - Will ask IR to place antegrade stent now that thrombocytopenia has improved somewhat. This is not urgent and can be done any time they feel appropriate but would prefer it done before patient discharges.   - cont culture specific abx per IM  - continue chronic chirinos   - eventual cystoscopy with ureteroscopy and lithotripsy for stone removal 2-3wks after antegrade stent placement as outpatient (has seen Dr. Sullivan in the past)     2. Scrotal ecchymosis  -no concerns for abscess or infection  -likely related to thrombocytopenia  -observe    Michael Rizzo PA-C 3/16/2021 7:06 AM  [facility]  Mon-Fri, 7am - 4pm  Office: [phone]    Medications     dextrose       - MEDICATION INSTRUCTIONS -       - MEDICATION INSTRUCTIONS -         acetaminophen  650 mg Oral Q6H     allopurinol  300 mg Oral QPM     atorvastatin  10 mg Oral QPM     cefTRIAXone  2 g Intravenous Q24H     metoprolol  12.5 mg Oral or Feeding Tube BID     nystatin  500,000 Units Swish & Spit 4x Daily     PARoxetine  20 mg Oral QPM     potassium & sodium phosphates  2 packet Oral or Feeding Tube TID     senna-docusate  1 tablet Oral At Bedtime     sodium chloride (PF)  10 mL Irrigation Q24H       Data   Results for orders placed or performed during the hospital encounter of 03/09/21 (from the past 24 hour(s))   XR Feeding Tube Placement    Narrative    FEEDING TUBE PLACEMENT   3/15/2021 9:23 AM    HISTORY: Nutritional needs. Existing feeding tube coiled in the  stomach.    COMPARISON: None    FINDINGS: 1.9 minutes of fluoroscopy were utilized for placement of  a  feeding tube. Existing feeding tube was retracted until there was no  coiling in the stomach. A wire was placed down the tube and the tube  was advanced to the ligament of Treitz.  25 mL of contrast was  injected to confirm placement.  The tube was marked at the level of  the nose at the 109 cm length.  Estimated blood loss during the  procedure was less than 5 mL. No specimens collected. There were no  complications of the procedure.    Medications used: red cuba 109cm, 6mL Lidocaine gel, 25mL Omnipaque  240    Images Obtained: 1      Impression    IMPRESSION: Successful feeding tube placement. Ready for use.         ERNIE FITZPATRICK PA-C   Glucose by meter   Result Value Ref Range    Glucose 120 (H) 70 - 99 mg/dL   Glucose by meter   Result Value Ref Range    Glucose 121 (H) 70 - 99 mg/dL   Glucose by meter   Result Value Ref Range    Glucose 108 (H) 70 - 99 mg/dL   Phosphorus   Result Value Ref Range    Phosphorus 2.4 (L) 2.5 - 4.5 mg/dL   Partial thromboplastin time   Result Value Ref Range    PTT 31 22 - 37 sec   CBC with platelets   Result Value Ref Range    WBC 7.6 4.0 - 11.0 10e9/L    RBC Count 3.68 (L) 4.4 - 5.9 10e12/L    Hemoglobin 10.3 (L) 13.3 - 17.7 g/dL    Hematocrit 33.8 (L) 40.0 - 53.0 %    MCV 92 78 - 100 fl    MCH 28.0 26.5 - 33.0 pg    MCHC 30.5 (L) 31.5 - 36.5 g/dL    RDW 16.2 (H) 10.0 - 15.0 %    Platelet Count 100 (L) 150 - 450 10e9/L   Basic metabolic panel   Result Value Ref Range    Sodium 145 (H) 133 - 144 mmol/L    Potassium 3.5 3.4 - 5.3 mmol/L    Chloride 107 94 - 109 mmol/L    Carbon Dioxide 38 (H) 20 - 32 mmol/L    Anion Gap <1 (L) 3 - 14 mmol/L    Glucose 119 (H) 70 - 99 mg/dL    Urea Nitrogen 21 7 - 30 mg/dL    Creatinine 0.62 (L) 0.66 - 1.25 mg/dL    GFR Estimate >90 >60 mL/min/[1.73_m2]    GFR Estimate If Black >90 >60 mL/min/[1.73_m2]    Calcium 7.4 (L) 8.5 - 10.1 mg/dL

## 2021-03-16 NOTE — PRE-PROCEDURE
GENERAL PRE-PROCEDURE:   Procedure:  Right nephrostogram, possible right ureteral stent placement, possible right nephrostomy tube exchange or removal with intravenous moderate sedation   Date/Time:  3/16/2021 11:00 AM    Written consent obtained?: Yes    Risks and benefits: Risks, benefits and alternatives were discussed    Consent given by:  Patient  Patient states understanding of procedure being performed: Yes    Patient's understanding of procedure matches consent: Yes    Procedure consent matches procedure scheduled: Yes    Expected level of sedation:  Moderate  Appropriately NPO:  Yes  ASA Class:  Class 3- Severe systemic disease, definite functional limitations  Mallampati  :  Grade 3- soft palate visible, posterior pharyngeal wall not visible  Lungs:  Lungs clear with good breath sounds bilaterally and other (comment)  Lung exam comment:  Few crackles right base, decreased left base  Heart:  Normal heart sounds and rate  History & Physical reviewed:  History and physical reviewed and no updates needed  Statement of review:  I have reviewed the lab findings, diagnostic data, medications, and the plan for sedation

## 2021-03-16 NOTE — PROGRESS NOTES
"[facility] Nurse Inpatient Wound Assessment   Reason for consultation: Evaluate and treat scrotal bruising and intact blisters    Assessment  Scrotal wounds due to unknown etiology discussed concerns for blood flow, possible clots with Medical student and Hospitalist   Status: healing  Bilateral hands and fingers cold and with gray coloration. On vasopressors, had procedure in IR for stent placement on right flank   Treatment Plan  Scrotal wounds: Every shift   Monitor for worsening s/s or draining, darkening coloration spreading, edema, redness  Contact WOC team for evolution or worsening     Sacrum:  1. Clean wound with saline or MicroKlenz Spray, pat dry  2. Wipe / \"clean\" the surrounding periwound tissue with several skin preps to help with dressing sticking  3. Press a Mepilex  Sacral Dressing ([id number])  to the area, making sure to conform nicely to skin curvatures. Make sure not to trap skin under foam dressing.  4. Time and date dressing change  -REPOSITION ALL PATIENTS SIDE TO SIDE ONLY WHEN IN BED, EVERY 2 HOURS,   -HEELS MUST BE KEPT ELEVATED AT ALL TIMES, USING AT LEAST 2 PILLOWS UNDER EACH CALF, ASSURING HEELS ARE FLOATING  -WHEN UP TO THE CHAIR PT NEEDS TO FULLY OFF LOAD EVERY 2 HOURS    Skin care plan to perineal/ coccyx: TID and prn  1. Clean with Koki Cleanse and Protect, wipe dry  2. Dust with baby powder  - keep pt positioned side to side only, when in bed, repositioning every 2 hours  - keep heels elevated at all times, at least one pillow under each leg from knees to heels, assuring heels are floating  - when up the chair pt should fully offload every 2 hours and be encouraged to shift weight every 15 minutes     Orders Reviewed  Recommended provider order: nothing at this time   WOC Nurse follow-up plan:weekly  Nursing to notify the Provider(s) and re-consult the WOC Nurse if wound(s) deteriorates or new skin concern.    Patient History  According to provider note(s): Mr. Ron Gilmore is a 80 year " old male with a past medical history of renal lithiasis, BPH, urinary retension with chronic indwelling Cardona, CVA with residual left-sided weakness, and COPD admitted from the Emergency Department on 3/9/2021 with sepsis secondary to pyelonephritis 2/2 ureterolithiasis on CT abdomen now s/p nephrostomy tube placement by IR the same day.       Objective Data  Containment of urine/stool: Incontinence Protocol and Incontinent pad in bed    Active Diet Order  Orders Placed This Encounter      NPO for Medical/Clinical Reasons Except for: Other, Meds; Specify: NPO for 4 hours for all procedures with IV sedation including Nephrostomy Tube and Stent exchange      Output:   I/O last 3 completed shifts:  In: 1300 [NG/GT:1300]  Out: 2855 [Urine:2855]    Risk Assessment:   Sensory Perception: 3-->slightly limited  Moisture: 3-->occasionally moist  Activity: 1-->bedfast  Mobility: 2-->very limited  Nutrition: 3-->adequate  Friction and Shear: 2-->potential problem  Lon Score: 14                          Labs:   Recent Labs   Lab 03/16/21  0600 03/12/21  1128 03/12/21  1128 03/10/21  0621 03/10/21  0621   ALBUMIN  --   --   --   --  1.7*   HGB 10.3*   < > 11.5*   < > 12.4*   INR  --   --  1.20*   < >  --    WBC 7.6   < > 14.1*   < > 30.6*    < > = values in this interval not displayed.       Physical Exam  Areas of skin assessed: focused scrotum, coccyx    Wound Location:  SCROTUM  Date of last photo 3/11  Wound History: etiology unclear  Wound Base: 100 % granular significant improvement noted since previous assessment     Palpation of the wound bed: normal      Drainage: none     Description of drainage: none     Measurements (length x width x depth, in cm) 2 open areas superior 1 x 0.8cm, inferior 0.8 x 0.6cm     Periwound skin: intact and ecchymosis      Color: red      Temperature: normal   Odor: none  Pain: mild, tender    Wound Location:  LEFT BUTTTOCK  Date of last photo 3/11  Wound History: venous congestion noted  from chronic pressure, history of pressure injury on left IT.   Wound Base: 100% intact U shaped ecchymosis      Palpation of the wound bed: normal      Drainage: none     Description of drainage: none    Scattered pinpoint denudement, improved    Periwound skin: venous congestion      Color: brown, dark red chronic discoloratratrion      Temperature: normal   Odor: none  Pain: absent, none      Interventions  Visual inspection and assessment completed   Wound Care Rationale Provide protection   Wound Care: done per plan of care  Supplies: at bedside and floor stock  Current off-loading measures: Pillows under calves and Wedge positioning system  Current support surface: Bariatric Airloss with pulsation  Education provided to: importance of repositioning, plan of care and Off-loading pressure  Discussed plan of care with Patient and Nurse    Zuleika Rowland, RN BS CWOCN

## 2021-03-16 NOTE — PROGRESS NOTES
[facility]    Hospitalist Progress Note    Date of Service (when I saw the patient): 03/16/2021    Assessment & Plan   Ron Gilmore is a 80 year old male who was admitted on 3/9/2021.  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 Coli.  Urology consultation requested patient underwent right sided percutaneous nephrostomy tube placement.  Patient was hypotensive and transferred to ICU was started on pressors.  He was initially on Zosyn and linezolid for history of VRE and with the E. coli being pansensitive was switched to IV ceftriaxone antibiotic currently.  He was also on heparin subcu for DVT prophylaxis.  And with the starting of heparin he is platelets gradually trended down.  Platelet count was 196 on admission on 3/9/2021 came down to 21K today.  HIT panel sent today.  Last dose of subcu heparin he received was yesterday morning at 630 since then it has been held and stopped today.     .  Severe sepsis with septic shock  secondary to right-sided UVJ stone with   hydronephrosis  Status post right-sided percutaneous nephrostomy tube placement by IR  E. coli bacteremia secondary to above;  A. fib RVR  Continue IV ceftriaxone for now  Patient is able to go off of pressors  Monitor hemodynamic status closely  Continue IV antibiotics while he is in the hospital with plan on discharging him on Ceftin twice daily   Per Urology IR is going to remove the percutaneous nephrostomy tube today and transition to intraureteral stent  -cont culture specific abx   -chronic chirinos    -eventual cystoscopy with ureteroscopy and lithotripsy for stone removal in 2-3wks as outpatient   Lactic acidosis secondary to above normalized now  Atrial fibrillation in the setting of sepsis.  Currently converted to normal sinus rhythm and heart rate well controlled       Thrombocytopenia in the setting of severe sepsis  Patient was also on subcu heparin with which might have contributed to thrombocytopenia;  HIT panel sent and returned negative . Stopped argatroban drip   Hematology consultation requested regarding further evaluation of pancytopenia as well  Platelets at 23-21-24K -41K-73K -100k  today   Will restart aspirin today as patient is at high risk of stroke with history of stroke discussed with hematology and they are okay with it    Acute encephalopathy most likely metabolic and infectious causes secondary to sepsis and ICU stay and hypernatremia contributing:  Patient more lethargic and somnolent since 3/13/21  Head without contrast was done yesterday on 3/13/2021 showed no acute findings.  Chronic lacunar infarcts in the right periventricular parenchyma in the basal ganglia bilaterally.  Superimposed moderate dilatation chronic small vessel ischemic change and generalized mild volume loss  Mental status much improved today  Patient to have video swallow study tomorrow as per speech pathology    Hypernatremia;  Fluid overload   Most likely due to free water deficit with him being on honey thickened liquids  Sodium  145-148-145  today  Appears euvolemic today     History of COPD on 3 to 4 L of oxygen by nasal cannula;  Currently stable  Continue duo nebs as needed for wheezing or shortness of breath     Hyperlipidemia;  Continue statin     Dysphagia;  Speech pathology had seen him and staredt him on puréed diet with honey thickened liquids diet to be advanced by speech path in the next 1 to 2 days  Continue  tube feeds for now until better PO intake   Speech pathology to do video swallow study  tomorrow resumption of oral diet    History of depression;  Continue paroxetine     History of CVA with chronic left-sided weakness;  Aspirin on hold as patient is on argatroban and with thrombocytopenia  Restart aspirin today as platelet count improved to 100 K  Patient to discharge back to his long-term care facility as he is at baseline wheelchair-bound and total cares     CODE STATUS is DNR/DNI     Cardona catheter in place for chronic urinary retention issues with BPH and history of CVA     DVT prophylaxis with PCD's and ambulation.         Disposition: Expected discharge in  3-4days when OK with Urology     Greater than 30 minutes were spent in taking care of him today including reviewing the chart, collaboration of care  Discussed with bedside RN and patient today      Thais Wilson MD  [phone] (P)      Interval History      Resting comfortably in bed denies any shortness of breath or chest pain.  Hemodynamically stable.  .   mental status much improved today.  Patient to have percutaneous nephrostomy tube removal and a intraureteral stent placement today per IR    -Data reviewed today: I reviewed all new labs and imaging results over the last 24 hours. I personally reviewed no images or EKG's today.    Physical Exam   Temp: 98.7  F (37.1  C) Temp src: Oral BP: (!) 149/85 Pulse: 93   Resp: 19 SpO2: 93 % O2 Device: Nasal cannula Oxygen Delivery: 2 LPM  Vitals:    03/12/21 0600 03/14/21 0630 03/16/21 0700   Weight: 115.5 kg (254 lb 10.1 oz) 115.9 kg (255 lb 8.2 oz) 118.7 kg (261 lb 11 oz)     Vital Signs with Ranges  Temp:  [97.9  F (36.6  C)-98.7  F (37.1  C)] 98.7  F (37.1  C)  Pulse:  [76-93] 93  Resp:  [12-19] 19  BP: (109-168)/(58-96) 149/85  SpO2:  [93 %-98 %] 93 %  I/O last 3 completed shifts:  In: 1170 [P.O.:100; NG/GT:1070]  Out: 2360 [Urine:2360]    Constitutional: More alert and awake , Alert,  Oriented x3 on questioning  Respiratory: Clear to auscultation, diminished in bases bilaterally  today  Cardiovascular: Regular rate and rhythm, normal S1 and S2, and no murmur noted  GI: Normal bowel sounds, soft, non-distended, non-tender.  Right-sided percutaneous nephrostomy tube and chronic indwelling catheter are in place  Skin/Integumen: No rashes, no cyanosis, no edema  Other:     Medications     dextrose       - MEDICATION INSTRUCTIONS -       - MEDICATION INSTRUCTIONS -       sodium chloride 0.9%         acetaminophen  650 mg Oral Q6H     allopurinol  300 mg Oral QPM     aspirin  325 mg Oral or Feeding Tube Daily     atorvastatin  10 mg Oral QPM     cefTRIAXone  2 g Intravenous Q24H     metoprolol  12.5 mg Oral or Feeding Tube BID     nystatin  500,000 Units Swish & Spit 4x Daily     PARoxetine  20 mg Oral QPM     senna-docusate  1 tablet Oral At Bedtime     sodium chloride (PF)  10 mL Irrigation Q24H       Data   Recent Labs   Lab 03/16/21  0600 03/15/21  0605 03/14/21  0634 03/12/21  1128 03/12/21  1128 03/10/21  1420 03/10/21  1420 03/10/21  0621   WBC 7.6 5.2 6.4   < > 14.1*   < >  --  30.6*   HGB 10.3* 10.7* 10.6*   < > 11.5*   < >  --  12.4*   MCV 92 91 92   < > 92   < >  --  94   * 73* 41*   < > 21*   < >  --  88*   INR  --   --   --   --  1.20*  --  1.58*  --    * 145* 148*  --   --    < >  --  143   POTASSIUM 3.5 3.6 3.7  --   --    < >  --  4.1   CHLORIDE 107 109 114*  --   --    < >  --  113*   CO2 38* 37* 36*  --   --    < >  --  17*   BUN 21 23 32*  --   --    < >  --  26   CR 0.62* 0.65* 0.68  --   --    < >  --  1.24   ANIONGAP <1* <1* <1*  --   --    < >  --  13   RAJ 7.4* 7.6* 7.9*  --   --    < >  --  7.4*   * 107* 103*  --   --    < >  --  126*   ALBUMIN  --   --   --   --   --   --   --  1.7*   PROTTOTAL  --   --   --   --   --   --   --  6.4*   BILITOTAL  --   --   --   --   --   --   --  1.2   ALKPHOS  --   --   --   --   --   --   --  67   ALT  --   --   --   --   --   --   --  25   AST  --   --   --   --   --   --   --  51*    < > = values in this interval  not displayed.       No results found for this or any previous visit (from the past 24 hour(s)).

## 2021-03-16 NOTE — IR NOTE
Patient Name: Ron Gilmore  Medical Record Number: [id number]  Today's Date: March 16, 2021    Start Time: 1321  End of procedure time: 1326  Procedure: right nephrostogram, possible ureteral stent placement, possible right nephrostomy tube removal or exchange with intravenous moderate sedation  Report given to: 66 RN  Time pt departs:  1345    Other Notes: Pt into IR suite 2 via cart IVF infusing. Pt awake and alert. To table in prone position prepped and draped with 2%. VSS. Tele NSR. Dr. Miner in room. Time out and procedure started. Pt tolerated procedure well. Debrief with Dr. Miner. Stent placed, drain removed. Dressing CDI. No complications. Pt transferred back to . Report given to 66 RN.    Medications: Last sedation given at 1319    Versed 1mg  Fentanyl 50mcg  Lidocaine 1% 6ml    Amara Carver RN

## 2021-03-16 NOTE — PROGRESS NOTES
[facility]    Urology Progress Note     Assessment & Plan  Ron Gilmore is a 80 year old male who was admitted on 3/9/2021. Urology following for distal right ureteral calculus, e. Coli urosepsis; s/p right PCN placement by IR on 3/9; scrotal ecchymosis with thrombocytopenia (improving). S/p antegrade ureteral stent placement and PCNT removal 3/16.     1. Distal right ureteral calculus and urosepsis: s/p antegrade ureteral stent placement 3/16.  - cont culture specific abx per IM  - continue chronic chirinos   - eventual cystoscopy with ureteroscopy and lithotripsy for stone removal in 2-3wks a as outpatient. Our office will coordinate this. (AVS updated)     2. Scrotal ecchymosis  -no concerns for abscess or infection  -likely related to thrombocytopenia  -Wound cares    Urology will sign off for now      Michael Rizzo PA-C 3/16/2021 4:11 PM  [facility]  Mon-Fri, 7am - 4pm  Office: [phone]      Interval History   Right nephrostomy tube removed and antegrade ureteral stent placed in IR today. No flank pain. Urine draining without issue.     Physical Exam   Temp: 97.8  F (36.6  C) Temp src: Oral BP: 117/63 Pulse: 94   Resp: 18 SpO2: 94 % O2 Device: Nasal cannula Oxygen Delivery: 2 LPM  Vitals:    21 0600 21 0630 21 0700   Weight: 115.5 kg (254 lb 10.1 oz) 115.9 kg (255 lb 8.2 oz) 118.7 kg (261 lb 11 oz)     Vital Signs with Ranges  Temp:  [97.8  F (36.6  C)-98.7  F (37.1  C)] 97.8  F (36.6  C)  Pulse:  [76-94] 94  Resp:  [12-19] 18  BP: (109-168)/(57-96) 117/63  SpO2:  [93 %-99 %] 94 %  I/O last 3 completed shifts:  In: 1300 [NG/GT:1300]  Out: 2855 [Urine:2855]    Temp (24hrs), Av.2  F (36.8  C), Min:97.8  F (36.6  C), Max:98.7  F (37.1  C)    GENERAL: Awake, alert, NAD. Lying in bed  NEURO: No facial asymmetry.  EYES: No icterus  HEAD, EARS, NOSE, MOUTH, AND THROAT: Atraumatic, normocephalic  NECK: Symmetric  CARDIAC: Skin well perfused  RESPIRATORY:  Breathing unlabored  ABDOMEN: Soft, non tender, non distended. No SP tenderness.  BACK/FLANKS: Right flank dressing in place and dry.  : Cardona in place draining clear allan urine. Mepilex on scrotum.  SKIN/HAIR/NAILS: No visible rashes  PSYCHIATRIC: Speech: normal Mood: normal Affect: normal        Medications     dextrose       - MEDICATION INSTRUCTIONS -       - MEDICATION INSTRUCTIONS -       sodium chloride 0.9%         acetaminophen  650 mg Oral Q6H     allopurinol  300 mg Oral QPM     anticoagulant citrate  5-10 mL Intracatheter Q24H     aspirin  325 mg Oral or Feeding Tube Daily     atorvastatin  10 mg Oral QPM     cefTRIAXone  2 g Intravenous Q24H     metoprolol  12.5 mg Oral or Feeding Tube BID     nystatin  500,000 Units Swish & Spit 4x Daily     PARoxetine  20 mg Oral QPM     senna-docusate  1 tablet Oral At Bedtime     sodium chloride (PF)  10 mL Irrigation Q24H       Data   Results for orders placed or performed during the hospital encounter of 03/09/21 (from the past 24 hour(s))   Glucose by meter   Result Value Ref Range    Glucose 120 (H) 70 - 99 mg/dL   Glucose by meter   Result Value Ref Range    Glucose 121 (H) 70 - 99 mg/dL   Glucose by meter   Result Value Ref Range    Glucose 108 (H) 70 - 99 mg/dL   Phosphorus   Result Value Ref Range    Phosphorus 2.4 (L) 2.5 - 4.5 mg/dL   Partial thromboplastin time   Result Value Ref Range    PTT 31 22 - 37 sec   CBC with platelets   Result Value Ref Range    WBC 7.6 4.0 - 11.0 10e9/L    RBC Count 3.68 (L) 4.4 - 5.9 10e12/L    Hemoglobin 10.3 (L) 13.3 - 17.7 g/dL    Hematocrit 33.8 (L) 40.0 - 53.0 %    MCV 92 78 - 100 fl    MCH 28.0 26.5 - 33.0 pg    MCHC 30.5 (L) 31.5 - 36.5 g/dL    RDW 16.2 (H) 10.0 - 15.0 %    Platelet Count 100 (L) 150 - 450 10e9/L   Basic metabolic panel   Result Value Ref Range    Sodium 145 (H) 133 - 144 mmol/L    Potassium 3.5 3.4 - 5.3 mmol/L    Chloride 107 94 - 109 mmol/L    Carbon Dioxide 38 (H) 20 - 32 mmol/L    Anion Gap <1  (L) 3 - 14 mmol/L    Glucose 119 (H) 70 - 99 mg/dL    Urea Nitrogen 21 7 - 30 mg/dL    Creatinine 0.62 (L) 0.66 - 1.25 mg/dL    GFR Estimate >90 >60 mL/min/[1.73_m2]    GFR Estimate If Black >90 >60 mL/min/[1.73_m2]    Calcium 7.4 (L) 8.5 - 10.1 mg/dL

## 2021-03-16 NOTE — PLAN OF CARE
Cognitive Concerns/ Orientation: A&Ox3-4. Forgetful at times. Speech is garbled. 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  BEHAVIOR & AGGRESSION TOOL COLOR: Green  ABNL VS/O2: VSS on 2L NC   MOBILITY: Total cares, T&R every 2 hours Up with lift.   PAIN MANAGMENT: Scheduled Tylenol  DIET:  DD 1 diet-attempted magic cup-unsure if he swallowing correctly. Needs to be reassessed by speech in am. Keep NPO. Meds given through Feeding tube. Tube feeding rate is at 70 ml/hr   BOWEL/BLADDER: R neph tube, chronic chirinos cath for retention. No BM  ABNL LAB/BG: Plt: 73, Na 145  DRAIN/DEVICES: R CVC saline locked-dressing changed  TELEMETRY RHYTHM: NSR  SKIN: upper and lower lips have sores on them-vasoline applied multiple times. Lower lip is becoming more swollen throughout the shift. Scattered bruising/scabs, left groin incision bruised and swollen.  Scabbed area on scrotum.  Coccyx wound unchanged-dressing c/d/i. Right neph tube dressing c/d/i  TESTS/PROCEDURES: none  D/C DAY/GOALS/PLACE: Pending improvement  OTHER IMPORTANT INFO: Loose, congested cough. Occasionally able to cough up thick yellow phlegm. Coarse lung sounds. Unable to use IS or breathing device. Receiving IV abx. More awake this evening. Step daughter visited.

## 2021-03-16 NOTE — PROGRESS NOTES
Service Date: 03/15/2021      SUBJECTIVE:  Mr. Gilmore is a 80-year-old gentleman who is admitted with sepsis secondary to right UVJ stone causing hydronephrosis. Blood culture is positive for E. coli.  The patient is on IV antibiotics.  Patient had a right nephrostomy tube placed.     Hematology is seeing the patient for thrombocytopenia.  On admission the platelet was normal at 196.  The patient's platelets had decreased to 21.  Multiple workups done for thrombocytopenia.  HIT is negative.  No vitamin B12 or folate deficiency.  The patient's thrombocytopenia is secondary to sepsis.  Platelet count is now improving and today it is 73.      I met with the patient.  The patient is still very weak.  He is slowly improving.  He is not bleeding from any site.      PHYSICAL EXAMINATION:   GENERAL:  He was alert but looked very weak. Not in any acute distress.   The rest of systems not examined.      LABORATORY DATA:  Labs reviewed.      ASSESSMENT:   1.  A 80-year-old gentleman with thrombocytopenia.  Thrombocytopenia is secondary to sepsis.  It is improving.   2.  Normocytic anemia.   3.  Escherichia coli sepsis.      PLAN:   1.  CBC reviewed with the patient.  I explained to him that platelets are improving.  I am expecting it to go back to normal as his overall condition improves.     The patient is anemic.  On admission hemoglobin was normal.  Since then it has decreased and now it is remaining stable around 10.5.  Anemia is related to sepsis and medication.  It should improve as the patient's overall condition improves.   At this time we will simply monitor CBC.         2.  Hematology/Oncology will continue to follow him intermittently.  Please call us with any questions. Discussed with Dr. Wilson.     Total time spent, 25 minutes.         KELSEA CUEVAS MD             D: 03/15/2021   T: 03/15/2021   MT: BASILIO      Name:     SHERI GILMORE   MRN:      [id number]        Account:      [id number]   :      1940            Service Date: 03/15/2021      Document: [id number]

## 2021-03-16 NOTE — PLAN OF CARE
Summary: Urosepsis secondary to R sided kidney stone  DATE & TIME: 3/15 nights  Cognitive Concerns/ Orientation: A&Ox3-4. Speech is garbled. 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  BEHAVIOR & AGGRESSION TOOL COLOR: Green  ABNL VS/O2: VSS on 2L NC   MOBILITY: Total cares, T&R every 2 hours Up with lift.   PAIN MANAGMENT: Scheduled Tylenol  DIET:  DD 1 diet-attempted magic cup-unsure if he swallowing correctly. Needs to be reassessed by speech in am. Keep NPO. Meds given through Feeding tube. Tube feeding rate is at 70 ml/hr   BOWEL/BLADDER: R neph tube, chronic chirinos cath for retention. No BM  ABNL LAB/BG: Plt: 73, Na 145  DRAIN/DEVICES: R CVC saline locked  TELEMETRY RHYTHM: SR with BBB  SKIN: upper and lower lips have sores on them-vasoline applied multiple times. Lower lip is swollen. Scattered bruising/scabs, left groin incision bruised and swollen.  Scabbed area on scrotum.  Coccyx wound unchanged-dressing c/d/i. Right neph tube dressing c/d/i  TESTS/PROCEDURES: none  D/C DAY/GOALS/PLACE: Pending improvement  OTHER IMPORTANT INFO: Loose, congested cough. Occasionally able to cough up thick yellow phlegm. Coarse lung sounds. Unable to use IS or breathing device. Receiving IV abx.

## 2021-03-16 NOTE — PLAN OF CARE
SLP Patient is NPO for a procedure and video swallow study rescheduled for 3/17/21. Nursing with concerns of diet tolerance and was made NPO. Will re-attempt later today if alert following procedure.

## 2021-03-16 NOTE — PROGRESS NOTES
Care Management Follow Up    Pt had nephrostogram and right ureteral stent placement with nephro tube removal today and currently resting.  Planning video swallow tomorrow.  Conversed with his Spouse Yuli.  She noted pt told her he felt like he was swallowing golf or tennis balls when he was eating the DD1 diet and she knew he was not happy with the foods he had to eat after his stroke.  Discussed having video swallow tomorrow.  She noted she thought he had mentioned this in his HCD that he would not want a feeding tube placed.  Will await findings of swallow exam.  Meanwhile, she will have discussion with her daughters tonight.    If pt did have a FT placed, he will most likely require a TCU.  Yuli noted she will need to line up extra care at home prior to discharge, so will need 24-48 advance notice of discharge.      Length of Stay (days): 7    Expected Discharge Date: 3/19/21 pending     Concerns to be Addressed: discharge planning     Patient plan of care discussed at interdisciplinary rounds: Yes    Anticipated Discharge Disposition: Home vs TCU     Anticipated Discharge Services: Pending video swallow findings and treatment plan  Anticipated Discharge DME: Oxygen, Bed, Wheelchair(has these things at home already)    Patient/family educated on Medicare website which has current facility and service quality ratings: will address if TCU is needed       Private pay costs discussed: Not applicable        Valery Lima, RN   Inpatient Care Management  [phone]

## 2021-03-17 ENCOUNTER — APPOINTMENT (OUTPATIENT)
Dept: GENERAL RADIOLOGY | Facility: CLINIC | Age: 81
DRG: 871 | End: 2021-03-17
Attending: INTERNAL MEDICINE
Payer: COMMERCIAL

## 2021-03-17 ENCOUNTER — APPOINTMENT (OUTPATIENT)
Dept: SPEECH THERAPY | Facility: CLINIC | Age: 81
DRG: 871 | End: 2021-03-17
Payer: COMMERCIAL

## 2021-03-17 LAB
APTT PPP: 31 SEC (ref 22–37)
ERYTHROCYTE [DISTWIDTH] IN BLOOD BY AUTOMATED COUNT: 16.3 % (ref 10–15)
HCT VFR BLD AUTO: 34.9 % (ref 40–53)
HGB BLD-MCNC: 10.4 G/DL (ref 13.3–17.7)
MAGNESIUM SERPL-MCNC: 1.9 MG/DL (ref 1.6–2.3)
MCH RBC QN AUTO: 27.2 PG (ref 26.5–33)
MCHC RBC AUTO-ENTMCNC: 29.8 G/DL (ref 31.5–36.5)
MCV RBC AUTO: 91 FL (ref 78–100)
PHOSPHATE SERPL-MCNC: 3 MG/DL (ref 2.5–4.5)
PLATELET # BLD AUTO: 151 10E9/L (ref 150–450)
POTASSIUM SERPL-SCNC: 4.1 MMOL/L (ref 3.4–5.3)
RBC # BLD AUTO: 3.82 10E12/L (ref 4.4–5.9)
WBC # BLD AUTO: 7.8 10E9/L (ref 4–11)

## 2021-03-17 PROCEDURE — 74230 X-RAY XM SWLNG FUNCJ C+: CPT

## 2021-03-17 PROCEDURE — 85027 COMPLETE CBC AUTOMATED: CPT | Performed by: INTERNAL MEDICINE

## 2021-03-17 PROCEDURE — 84132 ASSAY OF SERUM POTASSIUM: CPT | Performed by: INTERNAL MEDICINE

## 2021-03-17 PROCEDURE — 99233 SBSQ HOSP IP/OBS HIGH 50: CPT | Performed by: INTERNAL MEDICINE

## 2021-03-17 PROCEDURE — 250N000013 HC RX MED GY IP 250 OP 250 PS 637: Performed by: INTERNAL MEDICINE

## 2021-03-17 PROCEDURE — 84100 ASSAY OF PHOSPHORUS: CPT | Performed by: INTERNAL MEDICINE

## 2021-03-17 PROCEDURE — 92611 MOTION FLUOROSCOPY/SWALLOW: CPT | Mod: GN | Performed by: SPEECH-LANGUAGE PATHOLOGIST

## 2021-03-17 PROCEDURE — 85730 THROMBOPLASTIN TIME PARTIAL: CPT | Performed by: INTERNAL MEDICINE

## 2021-03-17 PROCEDURE — 92526 ORAL FUNCTION THERAPY: CPT | Mod: GN | Performed by: SPEECH-LANGUAGE PATHOLOGIST

## 2021-03-17 PROCEDURE — 250N000009 HC RX 250: Performed by: INTERNAL MEDICINE

## 2021-03-17 PROCEDURE — 83735 ASSAY OF MAGNESIUM: CPT | Performed by: INTERNAL MEDICINE

## 2021-03-17 PROCEDURE — 120N000001 HC R&B MED SURG/OB

## 2021-03-17 PROCEDURE — 272N000078 HC NUTRITION PRODUCT INTERMEDIATE LITER

## 2021-03-17 RX ORDER — BARIUM SULFATE 400 MG/ML
SUSPENSION ORAL ONCE
Status: COMPLETED | OUTPATIENT
Start: 2021-03-17 | End: 2021-03-17

## 2021-03-17 RX ORDER — CEFUROXIME AXETIL 500 MG/1
500 TABLET ORAL EVERY 12 HOURS SCHEDULED
Status: DISCONTINUED | OUTPATIENT
Start: 2021-03-17 | End: 2021-03-22

## 2021-03-17 RX ORDER — CEFUROXIME AXETIL 250 MG/1
250 TABLET ORAL EVERY 12 HOURS SCHEDULED
Status: DISCONTINUED | OUTPATIENT
Start: 2021-03-17 | End: 2021-03-17

## 2021-03-17 RX ADMIN — ANTICOAGULANT CITRATE DEXTROSE SOLUTION FORMULA A 10 ML: 12.25; 11; 3.65 SOLUTION INTRAVENOUS at 16:51

## 2021-03-17 RX ADMIN — SENNOSIDES AND DOCUSATE SODIUM 1 TABLET: 8.6; 5 TABLET ORAL at 22:01

## 2021-03-17 RX ADMIN — Medication 12.5 MG: at 08:37

## 2021-03-17 RX ADMIN — ASPIRIN 325 MG ORAL TABLET 325 MG: 325 PILL ORAL at 08:37

## 2021-03-17 RX ADMIN — ACETAMINOPHEN 650 MG: 325 TABLET, FILM COATED ORAL at 08:36

## 2021-03-17 RX ADMIN — ACETAMINOPHEN 650 MG: 325 TABLET, FILM COATED ORAL at 22:01

## 2021-03-17 RX ADMIN — PAROXETINE HYDROCHLORIDE 20 MG: 20 TABLET, FILM COATED ORAL at 19:59

## 2021-03-17 RX ADMIN — Medication 12.5 MG: at 22:01

## 2021-03-17 RX ADMIN — ALLOPURINOL 300 MG: 300 TABLET ORAL at 19:59

## 2021-03-17 RX ADMIN — BARIUM SULFATE 25 ML: 400 SUSPENSION ORAL at 09:35

## 2021-03-17 RX ADMIN — CEFUROXIME AXETIL 500 MG: 500 TABLET ORAL at 19:59

## 2021-03-17 RX ADMIN — ATORVASTATIN CALCIUM 10 MG: 10 TABLET, FILM COATED ORAL at 19:59

## 2021-03-17 RX ADMIN — NYSTATIN 500000 UNITS: 100000 SUSPENSION ORAL at 12:43

## 2021-03-17 RX ADMIN — NYSTATIN 500000 UNITS: 100000 SUSPENSION ORAL at 08:36

## 2021-03-17 RX ADMIN — BARIUM SULFATE 20 ML: 400 SUSPENSION ORAL at 09:35

## 2021-03-17 RX ADMIN — CEFUROXIME AXETIL 500 MG: 500 TABLET ORAL at 16:51

## 2021-03-17 RX ADMIN — NYSTATIN 500000 UNITS: 100000 SUSPENSION ORAL at 22:02

## 2021-03-17 ASSESSMENT — ACTIVITIES OF DAILY LIVING (ADL)
ADLS_ACUITY_SCORE: 26

## 2021-03-17 ASSESSMENT — MIFFLIN-ST. JEOR: SCORE: 1875

## 2021-03-17 NOTE — PROGRESS NOTES
CLINICAL NUTRITION SERVICES - REASSESSMENT NOTE        Recommendations Ordered by Registered Dietitian (RD):   Isosource 1.5 at 70 mL/hr x 12 hr noc cycle to provide:  1260 kcal (14kcal/kg), 57 g protein (0.6 g/kg),148 g CHO, 12 g fiber, 957 mL H2O  Flushes 200 mL every 4 hours (turn down to 60 ml q4 if IVF running)    Malnutrition: (3/11)   % Weight Loss:  Weight loss does not meet criteria for malnutrition  % Intake:  Unable to determine without a nutrition history   Subcutaneous Fat Loss:  None observed  Muscle Loss:  None observed  Fluid Retention:  Mild as above      Malnutrition Diagnosis: Unable to determine due to inability to obtain a nutrition history      EVALUATION OF PROGRESS TOWARD GOALS   Diet: NDD1 with Honey thick liquids by spoon - Ordered today by SLP  Calorie Count ordered by MD    Nutrition Support: TF started on 3/11, reached goal on 3/12. Discontinued on 3/13 with diet advancement. Restarted 3/14 with change back to NPO    Intake/Tolerance:   - Diet start today.  Noted pt did tolerate % of several meals from 3/12-3/14.   Pt drowsy and not eager to eat but is motivated to get his NJ out.  - Last BM x 3/13  - Weight trendin.7 kg today. Decreased 7 kg - likey d/t weight with out air pump  - Medications reviewed    ASSESSED NUTRITION NEEDS:  Dosing Weight 90 kg (adjusted)  Estimated Energy Needs: 9080-3509 kcals (25-30 Kcal/Kg)  Justification: obese  Estimated Protein Needs: 110-135 grams protein (1.2-1.5 g pro/Kg)  Justification: hypercatabolism with acute illness  Estimated Fluid Needs: 4797-6529 mL (1 mL/Kcal)  Justification: maintenance    NEW FINDINGS:   Forgetful, lethargic at times. Left sided weakness from CVA    Previous Goals:   Isosource 1.5 at 70 mL/hr will meet % needs   Evaluation: met - but now turned down d/t diet initiation    Previous Nutrition Diagnosis:   Inadequate protein-energy intake related to NPO with plans to start TF today as evidenced by meeting 0%  protein and 11% energy needs from D5 IVF   Evaluation: met    CURRENT NUTRITION DIAGNOSIS  Inadequate protein-energy intake related to difficulty swallowing as evidenced by previous NPO and now initiation of dysphagia diet  Evaluation - improving    INTERVENTIONS  Recommendations / Nutrition Prescription  Isosource 1.5 at 70 mL/hr x 12 hr cycle to promote intake to provide: to provide:  1260 kcal (14kcal/kg), 57 g protein (0.6 g/kg),148 g CHO, 12 g fiber, 957 mL H2O, meeting 50% of estimated needs  Flushes 200 mL every 4 hours     Add magic cup between meals.     Implementation  EN Composition, EN Schedule, Feeding Tube Flush: as above  Collaboration and Referral of Nutrition care: discusses TF change to cycle with assigned RN-TF to be turned off now  ED: Discussed intake goals to discontinue FT    Goals  P.o intake > 75% of estimated needs and discontinue TF/NJ tube  Tolerate p.o diet      MONITORING AND EVALUATION:  Progress towards goals will be monitored and evaluated per protocol and Practice Guidelines      Shadia Kelley, BHAVIK, LD

## 2021-03-17 NOTE — PLAN OF CARE
Cognitive Concerns/ Orientation: A&Ox3-4, disoriented to full situation, forgetful. Lethargic at times. Speech garbled. Left sided weakness from previous CVA. BEHAVIOR & AGGRESSION TOOL COLOR: Green  ABNL VS/O2: VSS on r/a    MOBILITY: Total cares, T&R every 2 hours (refuses at times), frequent ROM exercises preformed when awake with staff assist. Up with lift.     PAIN MANAGMENT: Denies pain. Scheduled Tylenol (refuses at times)    DIET: DD1 with HTL's. Meds given through feeding tube. TF changed to nocturnal to entice appetite.    BOWEL/BLADDER: Chronic chirinos for retention.   ABNL LAB/BG: phos 3.0, Mg 1.9, K 4.1, Na 145 yesterday.  DRAIN/DEVICES: R CVC   TELEMETRY RHYTHM: NSR    SKIN:   --scabs on lips.   --Scattered bruising/scabs, left groin incision bruised. Scabbed area on scrotum   --1+ generalized edema, 2+ BUE edema    TESTS/PROCEDURES:   --IR removed R. Neph tube 3/16 and placed a ureteral stent.   --Video swallow study done, result is DD1 with HTL's. Anticipate TF for approximately 2 more days.    D/C DAY/GOALS/PLACE: 1-2 days pending diet. Pt to follow up with urology outpatient per note     OTHER IMPORTANT INFO:   --Infrequent, loose/congested cough. --Frequent oral cares provided.   --Refused to use IS, educated pt.   --Contact precautions maintained for VRE.

## 2021-03-17 NOTE — PLAN OF CARE
DATE & TIME: 3/16/2021 4143-6644  Cognitive Concerns/ Orientation: A&Ox3-4, disoriented to full situation, forgetful. Lethargic at times. Speech garbled. Left sided weakness from previous CVA. Very appreciative off staff   BEHAVIOR & AGGRESSION TOOL COLOR: Green  ABNL VS/O2: VSS on 2L NC (baseline)  MOBILITY: Total cares, T&R every 2 hours (refuses at times), frequent ROM exercises preformed when awake with staff assist. Up with lift.   PAIN MANAGMENT: Denies pain. Scheduled Tylenol (refuses at times)  DIET: NPO until speech eval/video swallow 3/17. Meds given through feeding tube. TF running at goal 70 mL/hr with 200 ml manual flushes q4hrs  BOWEL/BLADDER: Chronic chirinos for retention. No BM this shift, incontinent of bowel  ABNL LAB/BG: Platlets 100, Na 145, Hgb 10.3  DRAIN/DEVICES: R CVC saline locked with Q24h Rocephin  TELEMETRY RHYTHM: NSR  SKIN: Sores on lips (bleeding occasionally, dry). Scattered bruising/scabs, left groin incision bruised. Scabbed area on scrotum- WOC RN placed Mepilex on this 3/16 however there are no WOC orders. Coccyx wound, dressing CDI. 1+ generalized edema, 2+ BUE edema  TESTS/PROCEDURES: IR removed R. Neph tube 3/16 and placed a ureteral stent. Video swallow study in AM  D/C DAY/GOALS/PLACE: 1-2 days pending diet. Pt to follow up with urology outpatient per note   OTHER IMPORTANT INFO: Infrequent, loose/congested cough. Frequent oral cares provided. LS coarse at times. Refused to use IS, educated pt. Contact precautions maintained for VRE.

## 2021-03-17 NOTE — PROGRESS NOTES
[facility]    Hospitalist Progress Note    Date of Service (when I saw the patient): 03/17/2021    Assessment & Plan   Ron Gilmore is a 80 year old male who was admitted on 3/9/2021.  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 Coli.  Urology consultation requested patient underwent right sided percutaneous nephrostomy tube placement.  Patient was hypotensive and transferred to ICU was started on pressors.  He was initially on Zosyn and linezolid for history of VRE and with the E. coli being pansensitive was switched to IV ceftriaxone antibiotic currently.  He was also on heparin subcu for DVT prophylaxis.  And with the starting of heparin he is platelets gradually trended down.  Platelet count was 196 on admission on 3/9/2021 came down to 21K today.  HIT panel sent today. Heparin stopped. HIT panel returned negative     .  Severe sepsis with septic shock  secondary to right-sided UVJ stone with   hydronephrosis  Status post right-sided percutaneous nephrostomy tube placement by IR  E. coli bacteremia secondary to above;  A.fib RVR  Patient is able to go off of pressors   Was on  IV ceftriaxone 6/21 days of antibiotics   Switch to oral ceftin BID for 2more weeks   S/p Right percutanenous tube removal and IR placement of an antegrade R ureteral stent on 3/16/21  -chronic chirinos in place   -eventual cystoscopy with ureteroscopy and lithotripsy for stone removal in 2-3wks as outpatient per URology   Lactic acidosis secondary to above normalized now  Atrial fibrillation in  the setting of sepsis.  Currently converted to normal sinus rhythm and heart rate well controlled       Thrombocytopenia in the setting of severe sepsis  Patient was also on subcu heparin with which might have contributed to thrombocytopenia;  HIT panel sent and returned negative . Stopped argatroban drip   Hematology consultation requested regarding further evaluation of pancytopenia as well  Platelets at 23-21-24K -41K-73K -100k-151K  today    restarted aspirin on 3/16/21. as patient is at high risk of stroke with history of stroke discussed with hematology and they are okay with it    Acute encephalopathy most likely metabolic and infectious causes secondary to sepsis and ICU stay and hypernatremia contributing:  Dysphagia   Patient more lethargic and somnolent since 3/13/21  Head without contrast was done yesterday on 3/13/2021 showed no acute findings.  Chronic lacunar infarcts in the right periventricular parenchyma in the basal ganglia bilaterally.  Superimposed moderate dilatation chronic small vessel ischemic change and generalized mild volume loss  Mental status much improved with intermittent somnolence   Followed by speech pathology. Currently getting tube feeds via NJ tube   Video swallow study done and started on purred diet with honey thickened liquids   Continue tube feeds via NJ for now until good oral intake     Hypernatremia;  Fluid overload   Most likely due to free water deficit with him being on honey thickened liquids  Sodium  145-148-145  today  Appears euvolemic today     History of COPD on 3 to 4 L of oxygen by nasal cannula;  Currently stable  Continue duo nebs as needed for wheezing or shortness of breath     Hyperlipidemia;  Continue statin     Dysphagia;  Speech pathology had seen him and staredt him on puréed diet with honey thickened liquids diet to be advanced by speech path in the next 1 to 2 days  Continue  tube feeds for now until better PO intake   Followed by speech pathology.  Currently getting tube feeds via NJ tube   Video swallow study done and started on purred diet with honey thickened liquids   Continue tube feeds via NJ for now until good oral intake     History of depression;  Continue paroxetine     History of CVA with chronic left-sided weakness;  Aspirin on hold as patient is on argatroban and with thrombocytopenia  Restart aspirin today as platelet count improved to 100 K  Patient to discharge back to his long-term care facility as he is at baseline wheelchair-bound and total cares     CODE STATUS is DNR/DNI     Cardona catheter in place for chronic urinary retention issues with BPH and history of CVA     DVT prophylaxis with PCD's and ambulation.         Disposition: Expected discharge in 2-3days when oral intake and tube feeds can be stopped or continued decision is made     Greater than 30 minutes were spent in taking care of him today including reviewing the chart, collaboration of care  Discussed with bedside RN and patient and his wife Yuli   today      Thais Wilson MD  [phone] (P)      Interval History      Resting comfortably in bed denies any shortness of breath or chest pain.  .   mental status much improved today. Video swallow done today . Getting tube feeds via NJ     -Data reviewed today: I reviewed all new labs and imaging results over the last 24 hours. I personally reviewed no images or EKG's today.    Physical Exam   Temp: 98.9  F (37.2  C) Temp src: Oral BP: 136/70 Pulse: 91   Resp: 18 SpO2: 92 % O2 Device: Nasal cannula Oxygen Delivery: 2 LPM  Vitals:    03/14/21 0630 03/16/21 0700 03/17/21 0620   Weight: 115.9 kg (255 lb 8.2 oz) 118.7 kg (261 lb 11 oz) 111.7 kg (246 lb 4.1 oz)     Vital Signs with Ranges  Temp:  [97.8  F (36.6  C)-99.2  F (37.3  C)] 98.9  F (37.2  C)  Pulse:  [62-95] 91  Resp:  [12-19] 18  BP: (117-168)/(57-96) 136/70  SpO2:  [92 %-99 %] 92 %  I/O last 3 completed shifts:  In: 2744 [NG/GT:2744]  Out: 7495  [Urine:2455]    Constitutional: More alert and awake ,  Oriented x3 on questioning  Respiratory: Clear to auscultation, diminished in bases bilaterally today  Cardiovascular: Regular rate and rhythm, normal S1 and S2, and no murmur noted  GI: Normal bowel sounds, soft, non-distended, non-tender.   chronic indwelling catheter are in place  Skin/Integumen: No rashes, no cyanosis, no edema  Other:     Medications     dextrose       - MEDICATION INSTRUCTIONS -       - MEDICATION INSTRUCTIONS -         acetaminophen  650 mg Oral Q6H     allopurinol  300 mg Oral QPM     anticoagulant citrate  5-10 mL Intracatheter Q24H     aspirin  325 mg Oral or Feeding Tube Daily     atorvastatin  10 mg Oral QPM     cefuroxime  500 mg Oral Q12H FERNANDA     metoprolol  12.5 mg Oral or Feeding Tube BID     nystatin  500,000 Units Swish & Spit 4x Daily     PARoxetine  20 mg Oral QPM     senna-docusate  1 tablet Oral At Bedtime     sodium chloride (PF)  10 mL Irrigation Q24H       Data   Recent Labs   Lab 03/17/21  0615 03/16/21  0600 03/15/21  0605 03/14/21  0634 03/12/21  1128 03/12/21  1128 03/10/21  1420 03/10/21  1420   WBC 7.8 7.6 5.2 6.4   < > 14.1*   < >  --    HGB 10.4* 10.3* 10.7* 10.6*   < > 11.5*   < >  --    MCV 91 92 91 92   < > 92   < >  --     100* 73* 41*   < > 21*   < >  --    INR  --   --   --   --   --  1.20*  --  1.58*   NA  --  145* 145* 148*  --   --    < >  --    POTASSIUM 4.1 3.5 3.6 3.7  --   --    < >  --    CHLORIDE  --  107 109 114*  --   --    < >  --    CO2  --  38* 37* 36*  --   --    < >  --    BUN  --  21 23 32*  --   --    < >  --    CR  --  0.62* 0.65* 0.68  --   --    < >  --    ANIONGAP  --  <1* <1* <1*  --   --    < >  --    RAJ  --  7.4* 7.6* 7.9*  --   --    < >  --    GLC  --  119* 107* 103*  --   --    < >  --     < > = values in this interval not displayed.       Recent Results (from the past 24 hour(s))   IR Ureteral Stent Placement Right    Narrative    [facility]  LOCATION: [facility]  DATE: 3/16/2021    PROCEDURE: ANTEGRADE NEPHROSTOGRAM WITH ANTEGRADE URETERAL STENT  PLACEMENT AND REMOVAL OF THE PRE-EXISTING PERCUTANEOUS NEPHROSTOMY.    INTERVENTIONAL RADIOLOGIST: Filiberto Miner MD.    INDICATION: 80-year-old male with an obstructing right ureteral  calculus. Conversion of the nephrostomy tube and antegrade ureteral  stent is requested.    MODERATE SEDATION: Versed 1 mg IV; Fentanyl 50 mcg IV.  Under  physician supervision, Versed and fentanyl were administered for  moderate sedation. Pulse oximetry, heart rate and blood pressure were  continuously monitored by an independent trained observer. The  physician spent 15 minutes of face-to-face sedation time with the  patient.    CONTRAST: 50 mL Isovue-300.  ANTIBIOTICS: None.  ADDITIONAL MEDICATIONS: None.    FLUOROSCOPIC TIME: 1.7 minutes.  RADIATION DOSE: Air Kerma: 12 mGy.    COMPLICATIONS: No immediate complications.    STERILE BARRIER TECHNIQUE: Maximum sterile barrier technique was used.  Cutaneous antisepsis was performed at the operative site with  application of 2% chlorhexidine and large sterile drape. Prior to the  procedure, the  and assistant performed hand hygiene and wore  hat, mask, sterile gown, and sterile gloves during the entire  procedure.    PROCEDURE:    A  image was obtained. An antegrade nephrostogram was performed  through the existing right producing his nephrostomy. This was removed  over wire and exchange was made for a 6 [place]ic vascular sheath through  which a second safety wire was advanced into the upper right ureter. A  Kumpe catheter and Glidewire were then manipulated beyond the  obstructing ureteral calculus and into the urinary bladder.  Over-the-wire exchange was then made for an 8 Green[place]ic by 24 cm double-J  ureteral stent. The distal loop was formed within the urinary bladder  and the proximal loop within the right renal pelvis. The safety wire  was removed.    FINDINGS:  Initial   image shows the right percutaneous nephrostomy. An  injection of contrast reveals that the nephrostomy is patent. Again  seen is a mid to distal right ureteral calculus resulting in  obstruction.    The completion images the newly placed ureteral stent in excellent  position.      Impression    IMPRESSION:    1.  Successful utilization of the existing right percutaneous  nephrostomy access for placement of an antegrade ureteral stent as  detailed above.  2.  Removal of the pre-existing nephrostomy.    VENANCIO GOLDSTEIN MD   XR Video Swallow with SLP or OT    Narrative    VIDEO SWALLOWING EVALUATION  March 17, 2021 9:38 AM     HISTORY: Dysphagia.    COMPARISON: None.    FLUOROSCOPY TIME: 1.6 minutes.     Number of cine runs: Eight.    FINDINGS:    Thin: Not administered.    Nectar: Premature spill to the piriforms. Deep penetration with one  episode of questionable aspiration.    Honey: Premature spillage past the epiglottis. No penetration.    Pudding: Mild residue. No penetration.    Semisolid: Not administered.    Solid: Not administered.    This study only includes the cervical esophagus.    RUBY CARBONE MD

## 2021-03-17 NOTE — PROGRESS NOTES
03/17/21 0958   General Information   Onset of Illness/Injury or Date of Surgery 03/02/21   Referring Physician Carlos Gamboa DO   Patient/Family Therapy Goal Statement (SLP) Patient wants ice cream.    Pertinent History of Current Problem Ron Gilmore is a 80 year old male admitted on 2/8/2021.  Past history of BPH with urinary retention and chronic chirinos, oxygen-dependent COPD, BRAD, DM II, HLD, HTN, CVA with left sided weakness who presents with confusion and fever, found to have probable CAUTI.   Type of Evaluation   Type of Evaluation Swallow Evaluation   General Swallowing Observations   Swallowing Evaluation Videofluoroscopic swallow study (VFSS)   VFSS Evaluation   Radiologist Dr. Sanchez   Views Taken left lateral   Physical Location of Procedure [facility]   VFSS Textures Trialed Nectar-Thick Liquids;Honey-Thick Liquids;Purees   VFSS Evaluation: Nectar Thick Liquid Texture Trial   Mode of Presentation, Nectar spoon;cup;fed by clinician   Order of Presentation 1 5 9   Preparatory Phase Poor bolus control   Oral Phase, [place] Poor AP movement;Residue in oral cavity;Premature pharyngeal entry   Pharyngeal Phase, Nectar Delayed swallow reflex;Residue in valleculae   Rosenbek's Penetration Aspiration Scale: Nectar-Thick Liquid Trial Results 5 - contrast contacts vocal cords, visible residue remains (penetration)   Response to Aspiration, Nectar unproductive reflexive involuntary cough/throat clear   Diagnostic Statement Moderate to deep pentration to the cords with a cough response via the spoon. Delayed to the pyriform sinuses via the cup without penetration.    VFSS Evaluation: Honey Thick Liquid Texture Trial   Mode of Presentation, Honey cup;spoon;fed by clinician   Order of Presentation 2 3 4   Preparatory Phase Poor bolus control   Oral Phase, Honey Poor AP movement;Residue in oral cavity;Premature pharyngeal entry   Pharyngeal Phase, Honey Delayed swallow reflex;Residue in valleculae   Rosenbek's  Penetration Aspiration Scale: Honey Trial Results 1 - no aspiration, contrast does not enter airway   Diagnostic Statement Delayed past the epiglottis without penetration.    VFSS Evaluation: Puree Solid Texture Trial   Mode of Presentation, Puree spoon;fed by clinician   Order of Presentation 7 8   Preparatory Phase Poor bolus control   Oral Phase, Puree Poor AP movement;Residue in oral cavity;Premature pharyngeal entry   Pharyngeal Phase, Puree Delayed swallow reflex;Residue in valleculae   Rosenbek's Penetration Aspiration Scale: Puree Food Trial Results 1 - no aspiration, contrast does not enter airway   Diagnostic Statement Delay to the valleculae with mild to moderate oral and vallecular residue.    Swallowing Recommendations   Diet Consistency Recommendations dysphagia level 1 (pureed);honey-thick liquids   Supervision Level for Intake 1:1 supervision needed   Mode of Delivery Recommendations bolus size, small;liquids via spoon only;no straws;slow rate of intake   Postural Recommendations none   Swallowing Maneuver Recommendations alternate food and liquid intake;effortful (hard) swallow;double dry swallow   Monitoring/Assistance Required (Eating/Swallowing) check mouth frequently for oral residue/pocketing;monitor for cough or change in vocal quality with intake;stop eating activities when fatigue is present   Recommended Feeding/Eating Techniques (Swallow Eval) maintain upright posture during/after eating for 30 minutes;maintain upright sitting position for eating;provide assist with feeding;set-up and prepare tray   Medication Administration Recommendations, Swallowing (SLP) Crushed medications or continue in the TF.    General Therapy Interventions   Planned Therapy Interventions Dysphagia Treatment   Dysphagia treatment Modified diet education;Instruction of safe swallow strategies   SLP Therapy Assessment/Plan   Criteria for Skilled Therapeutic Interventions Met (SLP Eval) yes   SLP Diagnosis Moderate  dysphagia   Rehab Potential (SLP Eval) good, to achieve stated therapy goals   Therapy Frequency (SLP Eval) daily   Predicted Duration of Therapy Intervention (SLP Eval) 1 week   Comment, Therapy Assessment/Plan (SLP) Patient presents with moderate oral and pharyngeal dysphagia on today's study. Deficits include; reduced bolus coordination/control during AP movement, premature entry, reduced BOT retraction, delay and incomplete epiglottic closure. These deficits resulted in premature spillage and delay to the pyriform sinuses with nectar thick liquids with one episode of  penetration to the cords with a cough response via the spoon. Delayed to past the epiglottis with honey thick liquids via the spoon and cup without penetration. Oral and vallecular residue with pudding and delayed to the valleculae. Patient continue to be at risk for aspiration given delayed weak swallow response. Recommend: 1. DDL 1 with honey thick liquids via the spoon. 2. 1:1 supervision/assistance, small bites, swallow hard x2 and alternate liquids/solids.    SLP Discharge Planning    SLP Discharge Recommendation (DC Rec) Transitional Care Facility   SLP Rationale for DC Rec Pt below baseline for swallow function. Will need ongoing SLP services to return to least restrictive diet.    SLP Brief overview of current status  Patient with moderate dysphagia on today's study. Patient continue to be at risk for aspiration given delayed weak swallow response. Recommend: 1. DDL 1 with honey thick liquids via the spoon. 2. 1:1 supervision/assistance, small bites, swallow hard x2 and alternate liquids/solids. Hold diet if increased coughing or changes in his respiratory status.     Total Evaluation Time   Total Evaluation Time (Minutes) 20

## 2021-03-18 ENCOUNTER — APPOINTMENT (OUTPATIENT)
Dept: SPEECH THERAPY | Facility: CLINIC | Age: 81
DRG: 871 | End: 2021-03-18
Payer: COMMERCIAL

## 2021-03-18 LAB
ANION GAP SERPL CALCULATED.3IONS-SCNC: <1 MMOL/L (ref 3–14)
APTT PPP: 32 SEC (ref 22–37)
BUN SERPL-MCNC: 24 MG/DL (ref 7–30)
CALCIUM SERPL-MCNC: 7.6 MG/DL (ref 8.5–10.1)
CHLORIDE SERPL-SCNC: 104 MMOL/L (ref 94–109)
CO2 SERPL-SCNC: 36 MMOL/L (ref 20–32)
CREAT SERPL-MCNC: 0.71 MG/DL (ref 0.66–1.25)
ERYTHROCYTE [DISTWIDTH] IN BLOOD BY AUTOMATED COUNT: 16.5 % (ref 10–15)
GFR SERPL CREATININE-BSD FRML MDRD: 89 ML/MIN/{1.73_M2}
GLUCOSE SERPL-MCNC: 126 MG/DL (ref 70–99)
HCT VFR BLD AUTO: 32.6 % (ref 40–53)
HGB BLD-MCNC: 9.8 G/DL (ref 13.3–17.7)
LABORATORY COMMENT REPORT: NORMAL
MCH RBC QN AUTO: 27.8 PG (ref 26.5–33)
MCHC RBC AUTO-ENTMCNC: 30.1 G/DL (ref 31.5–36.5)
MCV RBC AUTO: 93 FL (ref 78–100)
PLATELET # BLD AUTO: 172 10E9/L (ref 150–450)
POTASSIUM SERPL-SCNC: 4.2 MMOL/L (ref 3.4–5.3)
RBC # BLD AUTO: 3.52 10E12/L (ref 4.4–5.9)
SARS-COV-2 RNA RESP QL NAA+PROBE: NEGATIVE
SODIUM SERPL-SCNC: 139 MMOL/L (ref 133–144)
SPECIMEN SOURCE: NORMAL
WBC # BLD AUTO: 8.3 10E9/L (ref 4–11)

## 2021-03-18 PROCEDURE — 85027 COMPLETE CBC AUTOMATED: CPT | Performed by: INTERNAL MEDICINE

## 2021-03-18 PROCEDURE — 250N000013 HC RX MED GY IP 250 OP 250 PS 637: Performed by: INTERNAL MEDICINE

## 2021-03-18 PROCEDURE — 92526 ORAL FUNCTION THERAPY: CPT | Mod: GN | Performed by: SPEECH-LANGUAGE PATHOLOGIST

## 2021-03-18 PROCEDURE — 99232 SBSQ HOSP IP/OBS MODERATE 35: CPT | Performed by: INTERNAL MEDICINE

## 2021-03-18 PROCEDURE — 99231 SBSQ HOSP IP/OBS SF/LOW 25: CPT | Performed by: INTERNAL MEDICINE

## 2021-03-18 PROCEDURE — 120N000001 HC R&B MED SURG/OB

## 2021-03-18 PROCEDURE — U0003 INFECTIOUS AGENT DETECTION BY NUCLEIC ACID (DNA OR RNA); SEVERE ACUTE RESPIRATORY SYNDROME CORONAVIRUS 2 (SARS-COV-2) (CORONAVIRUS DISEASE [COVID-19]), AMPLIFIED PROBE TECHNIQUE, MAKING USE OF HIGH THROUGHPUT TECHNOLOGIES AS DESCRIBED BY CMS-2020-01-R: HCPCS | Performed by: INTERNAL MEDICINE

## 2021-03-18 PROCEDURE — 85730 THROMBOPLASTIN TIME PARTIAL: CPT | Performed by: INTERNAL MEDICINE

## 2021-03-18 PROCEDURE — 80048 BASIC METABOLIC PNL TOTAL CA: CPT | Performed by: INTERNAL MEDICINE

## 2021-03-18 PROCEDURE — 272N000078 HC NUTRITION PRODUCT INTERMEDIATE LITER

## 2021-03-18 PROCEDURE — U0005 INFEC AGEN DETEC AMPLI PROBE: HCPCS | Performed by: INTERNAL MEDICINE

## 2021-03-18 PROCEDURE — 250N000009 HC RX 250: Performed by: INTERNAL MEDICINE

## 2021-03-18 RX ADMIN — NYSTATIN 500000 UNITS: 100000 SUSPENSION ORAL at 22:34

## 2021-03-18 RX ADMIN — Medication 12.5 MG: at 20:51

## 2021-03-18 RX ADMIN — ATORVASTATIN CALCIUM 10 MG: 10 TABLET, FILM COATED ORAL at 20:47

## 2021-03-18 RX ADMIN — ALLOPURINOL 300 MG: 300 TABLET ORAL at 20:47

## 2021-03-18 RX ADMIN — ANTICOAGULANT CITRATE DEXTROSE SOLUTION FORMULA A 5 ML: 12.25; 11; 3.65 SOLUTION INTRAVENOUS at 06:24

## 2021-03-18 RX ADMIN — CEFUROXIME AXETIL 500 MG: 500 TABLET ORAL at 08:25

## 2021-03-18 RX ADMIN — ANTICOAGULANT CITRATE DEXTROSE SOLUTION FORMULA A 10 ML: 12.25; 11; 3.65 SOLUTION INTRAVENOUS at 17:42

## 2021-03-18 RX ADMIN — ACETAMINOPHEN 650 MG: 325 TABLET, FILM COATED ORAL at 04:45

## 2021-03-18 RX ADMIN — PAROXETINE HYDROCHLORIDE 20 MG: 20 TABLET, FILM COATED ORAL at 20:47

## 2021-03-18 RX ADMIN — NYSTATIN 500000 UNITS: 100000 SUSPENSION ORAL at 08:25

## 2021-03-18 RX ADMIN — CEFUROXIME AXETIL 500 MG: 500 TABLET ORAL at 20:47

## 2021-03-18 RX ADMIN — Medication 12.5 MG: at 08:27

## 2021-03-18 RX ADMIN — NYSTATIN 500000 UNITS: 100000 SUSPENSION ORAL at 17:42

## 2021-03-18 RX ADMIN — ASPIRIN 325 MG ORAL TABLET 325 MG: 325 PILL ORAL at 08:25

## 2021-03-18 ASSESSMENT — ACTIVITIES OF DAILY LIVING (ADL)
ADLS_ACUITY_SCORE: 26

## 2021-03-18 ASSESSMENT — MIFFLIN-ST. JEOR: SCORE: 1906

## 2021-03-18 NOTE — PROGRESS NOTES
Service Date: 2021      SUBJECTIVE:  Mr. Gilmore is a 80-year-old gentleman with multiple medical problems including stroke.  The patient was admitted with sepsis secondary to E. coli bacteremia.  Hematology was consulted for thrombocytopenia.  On admission, platelet was normal.  It had decreased to 21.  Multiple workup was done. The patient's thrombocytopenia is secondary to sepsis.  The patient's sepsis has been improving.  Platelet has been improving.  For last 2 days, platelets are normal and today, it is 172.      I met with the patient.  He is still very weak.  He is not having bleeding from any site.  He is not having any fever.      PHYSICAL EXAMINATION:   GENERAL:  He is alert.  He is very weak.  He is not in acute distress.   The rest of the systems not examined.      LABORATORY DATA:  Reviewed.  Platelets of 172.      ASSESSMENT:    1.  A 80-year-old gentleman with acute thrombocytopenia secondary to sepsis.  Thrombocytopenia has resolved.   2.  Normocytic anemia due to anemia of chronic disease and also sepsis.      PLAN:  CBC was reviewed with the patient.  I explained to him that platelet has been normal for the last 2 days.  He was happy to know that.  His hemoglobin is low at 9.8.  This should also improve as his overall condition improves.  No further workup needed from Hematology/Oncology side.  We will sign off.  Please call us with any questions.         KELSEA CUEVAS MD             D: 2021   T: 2021   MT: MARY CARMEN      Name:     SHERI GILMORE   MRN:      [id number]        Account:      [id number]   :      1940           Service Date: 2021      Document: [id number]

## 2021-03-18 NOTE — PLAN OF CARE
Cognitive Concerns/ Orientation: A&Ox4 forgetful. Garbled. Left sided weakness from previous CVA.   BEHAVIOR & AGGRESSION TOOL COLOR: Green  ABNL VS/O2: VSS on 2L NC   MOBILITY: Total cares, T&R every 2 hours. Up with lift.   PAIN MANAGMENT: Denies pain. Scheduled Tylenol  DIET: DD1 with HTL's. Meds given through feeding tube. TF @70ml/hr started 8:10pm-8:10am  BOWEL/BLADDER: Chronic chirinos for retention.   ABNL LAB/BG: phos 3.0, Mg 1.9, K 4.1, Na 145 yesterday.  DRAIN/DEVICES: R CVC   TELEMETRY RHYTHM: NSR  SKIN: scabs on lower lip. Scattered bruising/scabs, left groin incision bruised. Scabbed area on scrotum.1+ generalized edema, 2+ BUE edema  TESTS/PROCEDURES:   D/C DAY/GOALS/PLACE: 2-3 days pending diet. Pt to follow up with urology outpatient per note   OTHER IMPORTANT INFO: Frequent productive cough. Contact precautions maintained for VRE.

## 2021-03-18 NOTE — PLAN OF CARE
Cognitive Concerns/ Orientation: A&Ox4 forgetful. Garbled. Left sided weakness from previous CVA. BEHAVIOR & AGGRESSION TOOL COLOR: Green  ABNL VS/O2: VSS on 1L NC  MOBILITY: Total cares, T&R every 2 hours. Up with lift.   PAIN MANAGMENT: Denies pain. Scheduled Tylenol  DIET: DD1 with HTL's. Meds given through feeding tube. TF stopped this morning at beginning of shift (nocturnal).  BOWEL/BLADDER: Chronic chirinos for retention.   ABNL LAB/BG: unremarkable labs  DRAIN/DEVICES: R CVC, reviewed with MD    TELEMETRY RHYTHM: NSR, discontinued  SKIN: scabs on lower lip. Scattered bruising/scabs, left groin incision bruised. Scabbed area on scrotum.1+ generalized edema, 2+ BUE edema  TESTS/PROCEDURES: none pending. Assessing for dietary needs to decide on G-tube placement.  D/C DAY/GOALS/PLACE: 1-2 days pending diet. Pt to follow up with urology outpatient per note   OTHER IMPORTANT INFO: Contact precautions maintained for VRE.

## 2021-03-18 NOTE — PROGRESS NOTES
CALORIE COUNT    Current Diet Order:   DD1 + Honey thick liquids    Supplement Order:   Magic cup w/ meals    Approximate Oral Intake for 3/17:   Calories: 530 kcal  Protein: 9 g     Number of Meals Recorded: 2  Number of Snacks Recorded: 0    Enteral NS Order:   Isosource 1.5 at 70 mL/hr x 12 hr noc cycle to provide:  1260 kcal (14kcal/kg), 57 g protein (0.6 g/kg),148 g CHO, 12 g fiber, 957 mL H2O  Flushes 200 mL every 4 hours (turn down to 60 ml q4 if IVF running)     ASSESSED NUTRITION NEEDS:  Dosing Weight 90 kg (adjusted)  Estimated Energy Needs: 8692-6189 kcals (25-30 Kcal/Kg)  Justification: obese  Estimated Protein Needs: 110-135 grams protein (1.2-1.5 g pro/Kg)  Justification: hypercatabolism with acute illness  Estimated Fluid Needs: 5486-6325 mL (1 mL/Kcal)  Justification: maintenance    Summary:    - Met 24% estimated energy needs, 8% estimated protein needs via oral intake yesterday.   - PO + TF = 80% estimated energy needs, 60% estimated protein needs.     Continue cyclic TF until meeting >/=60% estimated needs orally.   Calorie counts will continue through tomorrow.     Debbie Harper RD, LD  [facility] [facility], [facility]   Pager: [phone]  Weekend Pager: [phone]

## 2021-03-18 NOTE — PROGRESS NOTES
[facility]    Medicine Progress Note - Hospitalist Service        Date of Admission:  3/9/2021  3:23 AM    Assessment & Plan:   Ron Gilmore is a 80 year old male with history of diet-controlled diabetes mellitus, history of CVA with chronic left-sided weakness, chronic urinary retention needing chronic indwelling Chirinos catheter,  COPD on 3 to 4 L of oxygen by nasal cannula presented to the hospital with shaking chills and severe sepsis secondary to right UVJ ureterolithiasis with obstruction and hydronephrosis and subsequently was found to be bacteremic with E. Coli.     Septic shock secondary to E. coli bacteremia.  Right-sided UVJ stone with hydronephrosis status post right-sided percutaneous nephrostomy tube placement by IR  -Initially admitted to the ICU in septic shock  -Altogether has received 8 days of IV antibiotics, and 2 days of oral antibiotic, will plan on 4 more days of oral antibiotics to complete a 2-week course  -S/p Right percutanenous tube removal and IR placement of an antegrade R ureteral stent on 3/16/21  -chronic chirinos in place   -eventual cystoscopy with ureteroscopy and lithotripsy for stone removal in 2-3 wks as outpatient per URology     Paroxysmal atrial fibrillation.  -Converted to normal sinus rhythm, this was transient in the setting of sepsis.     Thrombocytopenia in the setting of severe sepsis  Patient was also on subcu heparin with which might have contributed to thrombocytopenia;  -HIT panel- negative .  -Hematology following; platelet counts improved to normal  -Aspirin restarted 3/16     Acute encephalopathy most likely metabolic and infectious causes secondary to sepsis.  Dysphagia.  -head without contrast on 3/13/2021 showed no acute findings.   -Followed by speech pathology.  Currently on a dysphagia 1 diet.  -Currently getting tube feeds via [place] tube    -On calorie count, if caloric requirement is inadequate may have to consider G-tube for feeding, will  discuss with family today    Hypernatremia.  Fluid overload   -Hypernatremia resolved     History of COPD on 3 to 4 L of oxygen by nasal cannula;  -Currently stable  -Continue duo nebs     Hyperlipidemia  -Continue statin     Dysphagia;  -Speech pathology following.  -Dysphagia 1 diet along with tube feeds for now as described above.        History of depression  Continue paroxetine     History of CVA with chronic left-sided weakness;  -Patient to discharge back to his long-term care facility as he is at baseline wheelchair-bound and total cares       Diet: Combination Diet Dysphagia Diet Level 1: Pureed; Honey Thickened Liquids (pre-thickened or use instant food thickener) (By spoon)  Adult Formula Drip Feeding: Continuous Isosource 1.5; Nasogastric tube; Goal Rate: 70; mL/hr; From: 7:00 PM; 7:00 AM; Medication - Feeding Tube Flush Frequency: At least 15-30 mL water before and after medication administration and with tube clogg...  Calorie Counts  Snacks/Supplements Adult: Magic Cup; With Meals     DVT Prophylaxis: Pneumatic Compression Devices   Cardona Catheter: in place, indication: Retention  Code Status: No CPR- Do NOT Intubate     Disposition Plan    Expected discharge: 2 - 3 days, recommended to prior living arrangement  Entered: Romulo Cavanaugh MD 03/18/2021, 1:02 PM        The patient's care was discussed with the Bedside Nurse and Patient.    Romulo Cavanaugh MD  Hospitalist Service  [facility]    ______________________________________________________________________    Interval History   No acute events.  Diet upgraded to dysphagia 1 and currently on a calorie count.  Afebrile.  No reported nausea or vomiting.  Oxygen requirement stable    Data reviewed today: I reviewed all medications, new labs and imaging results over the last 24 hours. I personally reviewed no images or EKG's today.    Physical Exam   Vital signs:  Temp: 98.4  F (36.9  C) Temp src: Oral BP: 123/61 Pulse: 91   Resp:  18 SpO2: 94 % O2 Device: Nasal cannula Oxygen Delivery: 3 LPM Height: 182.9 cm (6') Weight: 114.8 kg (253 lb 1.4 oz)  Estimated body mass index is 34.32 kg/m  as calculated from the following:    Height as of this encounter: 1.829 m (6').    Weight as of this encounter: 114.8 kg (253 lb 1.4 oz).      Wt Readings from Last 2 Encounters:   03/18/21 114.8 kg (253 lb 1.4 oz)   02/08/21 122.5 kg (270 lb)       Gen: AAOX3, NAD  HEENT: Supple neck, moist oral mucosa, no pallor  Resp: CTA B/L, normal WOB, no crackles, no wheezes  CVS: RRR, no murmur  Abd/GI: Soft, non-tender. BS- normoactive.   Skin: Warm, dry no rashes  MSK:  no pedal edema  Neuro- CN- intact.  Chronic left-sided weakness      Data   Recent Labs   Lab 03/18/21  0627 03/17/21  0615 03/16/21  0600 03/15/21  0605 03/12/21  1128 03/12/21  1128   WBC 8.3 7.8 7.6 5.2   < > 14.1*   HGB 9.8* 10.4* 10.3* 10.7*   < > 11.5*   MCV 93 91 92 91   < > 92    151 100* 73*   < > 21*   INR  --   --   --   --   --  1.20*     --  145* 145*   < >  --    POTASSIUM 4.2 4.1 3.5 3.6   < >  --    CHLORIDE 104  --  107 109   < >  --    CO2 36*  --  38* 37*   < >  --    BUN 24  --  21 23   < >  --    CR 0.71  --  0.62* 0.65*   < >  --    ANIONGAP <1*  --  <1* <1*   < >  --    RAJ 7.6*  --  7.4* 7.6*   < >  --    *  --  119* 107*   < >  --     < > = values in this interval not displayed.       No results found for this or any previous visit (from the past 24 hour(s)).  Medications     dextrose       - MEDICATION INSTRUCTIONS -       - MEDICATION INSTRUCTIONS -         acetaminophen  650 mg Oral Q6H     allopurinol  300 mg Oral QPM     anticoagulant citrate  5-10 mL Intracatheter Q24H     aspirin  325 mg Oral or Feeding Tube Daily     atorvastatin  10 mg Oral QPM     cefuroxime  500 mg Oral Q12H FERNANDA     metoprolol  12.5 mg Oral or Feeding Tube BID     nystatin  500,000 Units Swish & Spit 4x Daily     PARoxetine  20 mg Oral QPM     senna-docusate  1 tablet Oral At  Bedtime

## 2021-03-19 LAB
APTT PPP: 29 SEC (ref 22–37)
ERYTHROCYTE [DISTWIDTH] IN BLOOD BY AUTOMATED COUNT: 16.6 % (ref 10–15)
HCT VFR BLD AUTO: 32.9 % (ref 40–53)
HGB BLD-MCNC: 9.9 G/DL (ref 13.3–17.7)
MCH RBC QN AUTO: 28 PG (ref 26.5–33)
MCHC RBC AUTO-ENTMCNC: 30.1 G/DL (ref 31.5–36.5)
MCV RBC AUTO: 93 FL (ref 78–100)
PLATELET # BLD AUTO: 218 10E9/L (ref 150–450)
RBC # BLD AUTO: 3.54 10E12/L (ref 4.4–5.9)
WBC # BLD AUTO: 9.8 10E9/L (ref 4–11)

## 2021-03-19 PROCEDURE — 120N000001 HC R&B MED SURG/OB

## 2021-03-19 PROCEDURE — 99232 SBSQ HOSP IP/OBS MODERATE 35: CPT | Performed by: INTERNAL MEDICINE

## 2021-03-19 PROCEDURE — 250N000013 HC RX MED GY IP 250 OP 250 PS 637: Performed by: INTERNAL MEDICINE

## 2021-03-19 PROCEDURE — 85730 THROMBOPLASTIN TIME PARTIAL: CPT | Performed by: INTERNAL MEDICINE

## 2021-03-19 PROCEDURE — 272N000078 HC NUTRITION PRODUCT INTERMEDIATE LITER

## 2021-03-19 PROCEDURE — 85027 COMPLETE CBC AUTOMATED: CPT | Performed by: INTERNAL MEDICINE

## 2021-03-19 PROCEDURE — 250N000009 HC RX 250: Performed by: INTERNAL MEDICINE

## 2021-03-19 RX ADMIN — ATORVASTATIN CALCIUM 10 MG: 10 TABLET, FILM COATED ORAL at 19:35

## 2021-03-19 RX ADMIN — CEFUROXIME AXETIL 500 MG: 500 TABLET ORAL at 19:35

## 2021-03-19 RX ADMIN — NYSTATIN 500000 UNITS: 100000 SUSPENSION ORAL at 12:26

## 2021-03-19 RX ADMIN — Medication 12.5 MG: at 08:21

## 2021-03-19 RX ADMIN — ANTICOAGULANT CITRATE DEXTROSE SOLUTION FORMULA A 5 ML: 12.25; 11; 3.65 SOLUTION INTRAVENOUS at 16:31

## 2021-03-19 RX ADMIN — NYSTATIN 500000 UNITS: 100000 SUSPENSION ORAL at 08:21

## 2021-03-19 RX ADMIN — ASPIRIN 325 MG ORAL TABLET 325 MG: 325 PILL ORAL at 08:21

## 2021-03-19 RX ADMIN — CEFUROXIME AXETIL 500 MG: 500 TABLET ORAL at 08:21

## 2021-03-19 RX ADMIN — ANTICOAGULANT CITRATE DEXTROSE SOLUTION FORMULA A 5 ML: 12.25; 11; 3.65 SOLUTION INTRAVENOUS at 06:37

## 2021-03-19 RX ADMIN — NYSTATIN 500000 UNITS: 100000 SUSPENSION ORAL at 21:23

## 2021-03-19 RX ADMIN — PAROXETINE HYDROCHLORIDE 20 MG: 20 TABLET, FILM COATED ORAL at 19:35

## 2021-03-19 RX ADMIN — Medication 12.5 MG: at 21:24

## 2021-03-19 RX ADMIN — ALLOPURINOL 300 MG: 300 TABLET ORAL at 19:35

## 2021-03-19 RX ADMIN — NYSTATIN 500000 UNITS: 100000 SUSPENSION ORAL at 17:17

## 2021-03-19 RX ADMIN — ACETAMINOPHEN 650 MG: 325 TABLET, FILM COATED ORAL at 21:24

## 2021-03-19 ASSESSMENT — ACTIVITIES OF DAILY LIVING (ADL)
ADLS_ACUITY_SCORE: 24
ADLS_ACUITY_SCORE: 24
ADLS_ACUITY_SCORE: 26
ADLS_ACUITY_SCORE: 26
ADLS_ACUITY_SCORE: 24
ADLS_ACUITY_SCORE: 26

## 2021-03-19 NOTE — PROGRESS NOTES
[facility]    Medicine Progress Note - Hospitalist Service        Date of Admission:  3/9/2021  3:23 AM    Assessment & Plan:   Ron Gilmore is a 80 year old male with history of diet-controlled diabetes mellitus, history of CVA with chronic left-sided weakness, chronic urinary retention needing chronic indwelling Chirinos catheter,  COPD on 3 to 4 L of oxygen by nasal cannula presented to the hospital with shaking chills and severe sepsis secondary to right UVJ ureterolithiasis with obstruction and hydronephrosis and subsequently was found to be bacteremic with E. Coli.     Septic shock secondary to E. coli bacteremia.  Right-sided UVJ stone with hydronephrosis status post right-sided percutaneous nephrostomy tube placement by IR  -Initially admitted to the ICU in septic shock  -Altogether has received 8 days of IV antibiotics and was transitioned to oral Ceftin  -S/p Right percutanenous tube removal and IR placement of an antegrade R ureteral stent on 3/16/21  -chronic chirinos in place   -eventual cystoscopy with ureteroscopy and lithotripsy for stone removal in 2-3 wks as outpatient per Urology   -Continue Ceftin for 3 more days to complete a total 2-week course.  Discussed with infectious disease and urology PA, no need for antibiotics beyond 2 weeks.    Paroxysmal atrial fibrillation.  -Converted to normal sinus rhythm, this was transient in the setting of sepsis.     Thrombocytopenia in the setting of severe sepsis  Patient was also on subcu heparin with which might have contributed to thrombocytopenia.  -HIT panel- negative.  -Hematology following; platelet counts improved to normal  -Aspirin restarted 3/16     Acute encephalopathy most likely metabolic and infectious causes secondary to sepsis.  Dysphagia.  -head without contrast on 3/13/2021 showed no acute findings.   -Followed by speech pathology.  Currently on a dysphagia 1 diet.  -Also on tube feeds and on caloric counts.  Patient met his  caloric requirements in the last 24 hours, continue to monitor for another 24 hours, if adequate will discontinue tube feeds.  However if he is not meeting his caloric needs then will have to consider G-tube although patient and his spouse Yuli are both very hesitant about the idea of a G-tube.    Hypernatremia.  Fluid overload   -Hypernatremia resolved     History of COPD   -Currently stable  -Continue duo nebs     Hyperlipidemia  -Continue statin     Dysphagia;  -Speech pathology following.  -Dysphagia 1 diet along with tube feeds for now as described above.        History of depression  Continue paroxetine     History of CVA with chronic left-sided weakness;  -Patient to discharge back to his long-term care facility as he is at baseline wheelchair-bound and total cares       Diet: Combination Diet Dysphagia Diet Level 1: Pureed; Honey Thickened Liquids (pre-thickened or use instant food thickener) (By spoon)  Adult Formula Drip Feeding: Continuous Isosource 1.5; Nasogastric tube; Goal Rate: 70; mL/hr; From: 7:00 PM; 7:00 AM; Medication - Feeding Tube Flush Frequency: At least 15-30 mL water before and after medication administration and with tube clogg...  Calorie Counts  Snacks/Supplements Adult: Magic Cup; With Meals     DVT Prophylaxis: Pneumatic Compression Devices   Cardona Catheter: in place, indication: Retention  Code Status: No CPR- Do NOT Intubate     Disposition Plan    Expected discharge: 1-2 days, to prior living situation if able to maintain adequate caloric intake  Entered: Romulo Cavanaugh MD 03/19/2021, 10:02 AM        The patient's care was discussed with the Bedside Nurse and Patient.  And spouse Yuli on the phone    Romulo Cavanaugh MD  Hospitalist Service  [facility]    ______________________________________________________________________    Interval History   Patient denies new complaints.  He appears to be keeping up with his daily caloric needs.  Afebrile.  Denies  nausea or vomiting.    Data reviewed today: I reviewed all medications, new labs and imaging results over the last 24 hours. I personally reviewed no images or EKG's today.    Physical Exam   Vital signs:  Temp: 98.2  F (36.8  C) Temp src: Axillary BP: (!) 142/76 Pulse: 92   Resp: 18 SpO2: 96 % O2 Device: Nasal cannula Oxygen Delivery: 1 LPM Height: 182.9 cm (6') Weight: 114.8 kg (253 lb 1.4 oz)  Estimated body mass index is 34.32 kg/m  as calculated from the following:    Height as of this encounter: 1.829 m (6').    Weight as of this encounter: 114.8 kg (253 lb 1.4 oz).      Wt Readings from Last 2 Encounters:   03/18/21 114.8 kg (253 lb 1.4 oz)   02/08/21 122.5 kg (270 lb)       Gen: AAOX3, NAD  HEENT:  no pallor  Resp: CTA B/L, normal WOB  CVS: RRR, no murmur  Abd/GI: Soft, non-tender. BS- normoactive.   Skin: Warm, dry no rashes  MSK: Trace pedal edema  Neuro- CN- intact.  Chronic left-sided weakness      Data   Recent Labs   Lab 03/19/21  0636 03/18/21  0627 03/17/21  0615 03/16/21  0600 03/15/21  0605 03/12/21  1128 03/12/21  1128   WBC 9.8 8.3 7.8 7.6 5.2   < > 14.1*   HGB 9.9* 9.8* 10.4* 10.3* 10.7*   < > 11.5*   MCV 93 93 91 92 91   < > 92    172 151 100* 73*   < > 21*   INR  --   --   --   --   --   --  1.20*   NA  --  139  --  145* 145*   < >  --    POTASSIUM  --  4.2 4.1 3.5 3.6   < >  --    CHLORIDE  --  104  --  107 109   < >  --    CO2  --  36*  --  38* 37*   < >  --    BUN  --  24  --  21 23   < >  --    CR  --  0.71  --  0.62* 0.65*   < >  --    ANIONGAP  --  <1*  --  <1* <1*   < >  --    RAJ  --  7.6*  --  7.4* 7.6*   < >  --    GLC  --  126*  --  119* 107*   < >  --     < > = values in this interval not displayed.       No results found for this or any previous visit (from the past 24 hour(s)).  Medications     dextrose       - MEDICATION INSTRUCTIONS -       - MEDICATION INSTRUCTIONS -         acetaminophen  650 mg Oral Q6H     allopurinol  300 mg Oral QPM     anticoagulant citrate  5-10  mL Intracatheter Q24H     aspirin  325 mg Oral or Feeding Tube Daily     atorvastatin  10 mg Oral QPM     cefuroxime  500 mg Oral Q12H FERNANDA     metoprolol  12.5 mg Oral or Feeding Tube BID     nystatin  500,000 Units Swish & Spit 4x Daily     PARoxetine  20 mg Oral QPM     senna-docusate  1 tablet Oral At Bedtime

## 2021-03-19 NOTE — PLAN OF CARE
ummary: Urosepsis secondary to R sided kidney stone  DATE & TIME: 3/19/2021 9456-9206  forgetful. Garbled speech. Left sided weakness from previous CVA.   BEHAVIOR & AGGRESSION TOOL COLOR: Green  ABNL VS/O2: VSS on RA  MOBILITY: Total cares, T&R every 2 hours. Up with lift.   PAIN MANAGMENT: Denies pain. Scheduled Tylenol  DIET: DD1 with HTL's. Meds given through feeding tube. TF 7pm -7am  BOWEL/BLADDER: Chronic chirinos for retention patent, good output.   ABNL LAB/BG:   DRAIN/DEVICES: R CVC  TELEMETRY RHYTHM: NA  SKIN: scabs on lower lip. Scattered bruising/scabs, left groin bruised. Scabbed area on scrotum.1+ generalized edema, 2+ BUE edema  TESTS/PROCEDURES: none  D/C DAY/GOALS/PLACE: Discharge pending 2-3 days  Pt to follow up with urology outpatient per note   OTHER IMPORTANT INFO: Contact precautions maintained for VRE. On calorie count, total feed, up in chair at breakfast time.good appetite.

## 2021-03-19 NOTE — PROGRESS NOTES
CALORIE COUNT    Current Diet Order:   DD1 + Honey thick liquids     Supplement Order:   Magic cup w/ meals     Approximate Oral Intake for 3/18:   Calories: 2373 kcal  Protein: 72 g pro    Number of Meals Recorded: 3  Number of Snacks Recorded: 4    Enteral NS Order:   Isosource 1.5 at 70 mL/hr x 12 hr noc cycle to provide:  1260 kcal (14kcal/kg), 57 g protein (0.6 g/kg),148 g CHO, 12 g fiber, 957 mL H2O  Flushes 200 mL every 4 hours (turn down to 60 ml q4 if IVF running)     ASSESSED NUTRITION NEEDS:  Dosing Weight 90 kg (adjusted)  Estimated Energy Needs: 2658-8902 kcals (25-30 Kcal/Kg)  Justification: obese  Estimated Protein Needs: 110-135 grams protein (1.2-1.5 g pro/Kg)  Justification: hypercatabolism with acute illness  Estimated Fluid Needs: 1865-3237 mL (1 mL/Kcal)  Justification: maintenance    Summary:   - Met 100% estimated energy needs and 65% estimated protein needs yesterday via oral intake.   - TF + PO = >100% energy needs, 100% protein needs.     - Calorie counts to continue through today. If intakes remain consistent would recommend removing nasoenteric FT and allowing pt to take in 100% of nutrition orally.     Debbie Harper RD, LD  [facility], 70, 20, MH   Pager: [phone]  Weekend Pager: [phone]

## 2021-03-19 NOTE — PLAN OF CARE
Cognitive Concerns/ Orientation: A&Ox4 forgetful. Garbled speech. Left sided weakness from previous CVA. BEHAVIOR & AGGRESSION TOOL COLOR: Green  ABNL VS/O2: VSS on 1L NC  MOBILITY: Total cares, T&R every 2 hours. Up with lift.   PAIN MANAGMENT: Denies pain. Scheduled Tylenol  DIET: DD1 with HTL's. Meds given through feeding tube. TF started at 9pm-9am  BOWEL/BLADDER: Chronic chirinos for retention patent, good output.   ABNL LAB/BG:   DRAIN/DEVICES: R CVC  TELEMETRY RHYTHM: NSR  SKIN: scabs on lower lip. Scattered bruising/scabs, left groin incision bruised. Scabbed area on scrotum.1+ generalized edema, 2+ BUE edema  TESTS/PROCEDURES:   D/C DAY/GOALS/PLACE: Discharge pending 2-3 days  Pt to follow up with urology outpatient per note   OTHER IMPORTANT INFO: Contact precautions maintained for VRE.

## 2021-03-20 ENCOUNTER — APPOINTMENT (OUTPATIENT)
Dept: SPEECH THERAPY | Facility: CLINIC | Age: 81
DRG: 871 | End: 2021-03-20
Payer: COMMERCIAL

## 2021-03-20 LAB
APTT PPP: 27 SEC (ref 22–37)
ERYTHROCYTE [DISTWIDTH] IN BLOOD BY AUTOMATED COUNT: 16.6 % (ref 10–15)
HCT VFR BLD AUTO: 33.5 % (ref 40–53)
HGB BLD-MCNC: 10.1 G/DL (ref 13.3–17.7)
MCH RBC QN AUTO: 28 PG (ref 26.5–33)
MCHC RBC AUTO-ENTMCNC: 30.1 G/DL (ref 31.5–36.5)
MCV RBC AUTO: 93 FL (ref 78–100)
PLATELET # BLD AUTO: 248 10E9/L (ref 150–450)
RBC # BLD AUTO: 3.61 10E12/L (ref 4.4–5.9)
WBC # BLD AUTO: 11 10E9/L (ref 4–11)

## 2021-03-20 PROCEDURE — 120N000001 HC R&B MED SURG/OB

## 2021-03-20 PROCEDURE — 85730 THROMBOPLASTIN TIME PARTIAL: CPT | Performed by: INTERNAL MEDICINE

## 2021-03-20 PROCEDURE — 250N000009 HC RX 250: Performed by: INTERNAL MEDICINE

## 2021-03-20 PROCEDURE — 250N000013 HC RX MED GY IP 250 OP 250 PS 637: Performed by: INTERNAL MEDICINE

## 2021-03-20 PROCEDURE — 99232 SBSQ HOSP IP/OBS MODERATE 35: CPT | Performed by: INTERNAL MEDICINE

## 2021-03-20 PROCEDURE — 85027 COMPLETE CBC AUTOMATED: CPT | Performed by: INTERNAL MEDICINE

## 2021-03-20 PROCEDURE — 92526 ORAL FUNCTION THERAPY: CPT | Mod: GN | Performed by: SPEECH-LANGUAGE PATHOLOGIST

## 2021-03-20 RX ADMIN — PAROXETINE HYDROCHLORIDE 20 MG: 20 TABLET, FILM COATED ORAL at 21:43

## 2021-03-20 RX ADMIN — ALLOPURINOL 300 MG: 300 TABLET ORAL at 21:42

## 2021-03-20 RX ADMIN — ATORVASTATIN CALCIUM 10 MG: 10 TABLET, FILM COATED ORAL at 21:43

## 2021-03-20 RX ADMIN — ANTICOAGULANT CITRATE DEXTROSE SOLUTION FORMULA A 5 ML: 12.25; 11; 3.65 SOLUTION INTRAVENOUS at 07:00

## 2021-03-20 RX ADMIN — ASPIRIN 325 MG ORAL TABLET 325 MG: 325 PILL ORAL at 08:31

## 2021-03-20 RX ADMIN — NYSTATIN 500000 UNITS: 100000 SUSPENSION ORAL at 21:44

## 2021-03-20 RX ADMIN — ACETAMINOPHEN 650 MG: 325 TABLET, FILM COATED ORAL at 21:43

## 2021-03-20 RX ADMIN — CEFUROXIME AXETIL 500 MG: 500 TABLET ORAL at 08:31

## 2021-03-20 RX ADMIN — NYSTATIN 500000 UNITS: 100000 SUSPENSION ORAL at 08:31

## 2021-03-20 RX ADMIN — Medication 12.5 MG: at 21:42

## 2021-03-20 RX ADMIN — Medication 12.5 MG: at 08:32

## 2021-03-20 RX ADMIN — NYSTATIN 500000 UNITS: 100000 SUSPENSION ORAL at 13:01

## 2021-03-20 RX ADMIN — NYSTATIN 500000 UNITS: 100000 SUSPENSION ORAL at 18:34

## 2021-03-20 RX ADMIN — CEFUROXIME AXETIL 500 MG: 500 TABLET ORAL at 21:43

## 2021-03-20 RX ADMIN — SENNOSIDES AND DOCUSATE SODIUM 1 TABLET: 8.6; 5 TABLET ORAL at 21:44

## 2021-03-20 ASSESSMENT — ACTIVITIES OF DAILY LIVING (ADL)
ADLS_ACUITY_SCORE: 22
ADLS_ACUITY_SCORE: 24
ADLS_ACUITY_SCORE: 22
ADLS_ACUITY_SCORE: 24
ADLS_ACUITY_SCORE: 22
ADLS_ACUITY_SCORE: 22

## 2021-03-20 ASSESSMENT — MIFFLIN-ST. JEOR: SCORE: 1828

## 2021-03-20 NOTE — PROGRESS NOTES
CLINICAL NUTRITION SERVICES - REASSESSMENT NOTE      Future/Additional Recommendations: Oral intake yesterday per Calorie Count:  945 kcal and 48 g protein  Oral intake has been quite variable over the last 3 days...  Three day average for oral intake --> 1263 kcal and 43 g protein (Patient is meeting ~50% energy and ~40% protein needs orally)  Total with TF = 2523 kcal (29 kcal/kg), 100 g protein   Patient meeting 100% energy and 91% protein needs between TF + oral intake    Based on this, would recommend continuing the nocturnal TF over 12 hours to meet ~1/2 needs to make up for what he is lacking orally.    Malnutrition: % Weight Loss:  Weight loss does not meet criteria for malnutrition (fluids)  % Intake:  No decreased intake noted (meeting needs from TF + oral intake)  Subcutaneous Fat Loss:  None observed (3/11)  Muscle Loss:  None observed (3/11)  Fluid Retention:  Mild 1-2+ (generalized + trace)    Malnutrition Diagnosis: Patient does not meet two of the above criteria necessary for diagnosing malnutrition       EVALUATION OF PROGRESS TOWARD GOALS   Diet:  DD1, honey thick liquids + Magic Cup with meals     Nutrition Support:  Patient continues on nocturnal TF regimen as follows ~    Nutrition Support Enteral:  Type of Feeding Tube: Nasoduodenal   Enteral Frequency:  Cyclic over 12 hours   Enteral Regimen: Isosource 1.5 at 70 mL/hr from 7 pm to 7 am   Total Enteral Provisions: 1260 kcal (14 kcal/kg), 57 g protein (0.7 g/kg), 148 g CHO, 12 g fiber, 957 mL H2O  Free Water Flush: 60 mL every 4 hours     Intake/Tolerance:    Oral intake yesterday per Calorie Count:  945 kcal and 48 g protein  Oral intake has been quite variable over the last 3 days...  Three day average for oral intake --> 1263 kcal and 43 g protein (Patient is meeting ~50% energy and ~40% protein needs orally)  Total with TF = 2523 kcal (29 kcal/kg), 100 g protein   Patient meeting 100% energy and 91% protein needs between TF + oral  intake    Based on this, would recommend continuing the nocturnal TF over 12 hours to meet ~1/2 needs to make up for what he is lacking orally.       ASSESSED NUTRITION NEEDS: (Re-estimated 3/20 based on lower adjusted weight)  Dosing Weight::  87.4 kg   Estimated Energy Needs: 0497-3671 kcals (25-30 Kcal/Kg)  Justification: obese  Estimated Protein Needs: 105-130 grams protein (1.2-1.5 g pro/Kg)  Justification: hypercatabolism with acute illness      NEW FINDINGS:   Weight today 107 kg (down 10.7 kg from 3/7) --> Noted patient has been on Lasix for diuresis x 2 since admit, I/O 2595/2610    BM x 1 today and x 1 yest  No labs since 3/8     Previous Goals (3/17):   P.o intake > 75% of estimated needs and discontinue TF/NJ tube - Not met.   Tolerate p.o diet - Met, however meeting 50% of needs orally at best    Previous Nutrition Diagnosis (3/17):   Inadequate protein-energy intake related to difficulty swallowing as evidenced by previous NPO and now initiation of dysphagia diet  Evaluation: Improving      MALNUTRITION  % Weight Loss:  Weight loss does not meet criteria for malnutrition (fluids)  % Intake:  No decreased intake noted (meeting needs from TF + oral intake)  Subcutaneous Fat Loss:  None observed (3/11)  Muscle Loss:  None observed (3/11)  Fluid Retention:  Mild 1-2+ (generalized + trace)    Malnutrition Diagnosis: Patient does not meet two of the above criteria necessary for diagnosing malnutrition    CURRENT NUTRITION DIAGNOSIS  Inadequate oral intake related to dysphagia diet, need for feeding assistance as evidenced by meeting ~50% needs orally, need for supplemental TF     INTERVENTIONS  Recommendations / Nutrition Prescription  Continue DD1, honey thick liquids + Magic Cup with meals as above   Continue Isosource 1.5 at 70 mL/hr x 12 hours as above    Implementation  None     Goals  Patient will continue to meet needs from combination of TF + oral intake     MONITORING AND EVALUATION:  Progress  towards goals will be monitored and evaluated per protocol and Practice Guidelines

## 2021-03-20 NOTE — PROGRESS NOTES
Spoke with Dr. Cavanaugh via phone.  Patient is not in favor of G-tube, intakes have been variable.  Questioning if nocturnal TF may be suppressing appetite during day time.    Plan to pull FT and continue calorie count in hopes that oral intake will improve once TF off.    Nancy Reis, RD, LD, CNSC   Clinical Dietitian - [facility]

## 2021-03-20 NOTE — PLAN OF CARE
Summary: Urosepsis secondary to R sided kidney stone  DATE & TIME: 3/20/2021 9481-3132  COGNITIVE CONCERNS/ORIENTATION: A&o x3, disoriented to situation, forgetful. Garbled speech.    BEHAVIOR & AGGRESSION TOOL COLOR: Green  ABNL VS/O2: VSS on RA  MOBILITY: Total cares, T&R every 2 hours. Up with lift. Left sided weakness from previous CVA.  PAIN MANAGMENT: Denies pain. Refused scheduled Tylenol.  DIET: DD1 with honey-thickened liquids. Meds given through feeding tube. TF at goal rate 70 mL/hr with 60 mL flushes Q 4 hrs from 7pm -7am.  BOWEL/BLADDER: Chronic chirinos for retention patent, good output.   ABNL LAB/BG: None new.  DRAIN/DEVICES: R CVC, chirinos, NJ tube.  TELEMETRY RHYTHM: NA  SKIN: scabs on lower lip. Scattered bruising/scabs, left groin bruised. Scabbed area on scrotum.1+ generalized edema, 2+ BUE edema.  Wound coccyx, mepilex changed.  TESTS/PROCEDURES: none  D/C DAY/GOALS/PLACE: Discharge pending 2-3 days  Pt to follow up with urology outpatient per note.   OTHER IMPORTANT INFO: Contact precautions maintained for VRE. On calorie count, total feed.

## 2021-03-20 NOTE — PLAN OF CARE
Summary: Urosepsis secondary to R sided kidney stone  DATE & TIME: 3/20/2021 0700 1930  COGNITIVE CONCERNS/ORIENTATION: A&o x3, disoriented to situation, forgetful. Garbled speech.    BEHAVIOR & AGGRESSION TOOL COLOR: Green  ABNL VS/O2: VSS on RA  MOBILITY: Total cares, T&R every 2 hours. Up with lift. Left sided weakness from previous CVA.  PAIN MANAGMENT: Denies pain. Refused scheduled Tylenol.  DIET: DD1 with honey-thickened liquids. Meds  given crushed with pudding.  BOWEL/BLADDER: Chronic chirinos for retention patent, good output.   ABNL LAB/BG: None new.  DRAIN/DEVICES: R CVC  TELEMETRY RHYTHM: NA  SKIN: scabs on lower lip. Scattered bruising/scabs, left groin bruised. Scabbed area on scrotum.1+ generalized edema, 2+ BUE edema.  Wound coccyx, mepilex changed.  TESTS/PROCEDURES: none  D/C DAY/GOALS/PLACE: Discharge pending 2-3 days   OTHER IMPORTANT INFO: Contact precautions maintained for VRE. calorie count,Discontinued NG tube and tube feedings, feeder, appetite fair.speech following

## 2021-03-20 NOTE — PROGRESS NOTES
[facility]    Medicine Progress Note - Hospitalist Service        Date of Admission:  3/9/2021  3:23 AM    Assessment & Plan:   Ron Gilmore is a 80 year old male with history of diet-controlled diabetes mellitus, history of CVA with chronic left-sided weakness, chronic urinary retention needing chronic indwelling Chirinos catheter,  COPD on 3 to 4 L of oxygen by nasal cannula presented to the hospital with shaking chills and severe sepsis secondary to right UVJ ureterolithiasis with obstruction and hydronephrosis and subsequently was found to be bacteremic with E. Coli.     Septic shock secondary to E. coli bacteremia.  Right-sided UVJ stone with hydronephrosis status post right-sided percutaneous nephrostomy tube placement by IR  -Initially admitted to the ICU in septic shock  -Altogether has received 8 days of IV antibiotics and was transitioned to oral Ceftin  -S/p Right percutanenous tube removal and IR placement of an antegrade R ureteral stent on 3/16/21  -chronic chirinos in place   -eventual cystoscopy with ureteroscopy and lithotripsy for stone removal in 2-3 wks as outpatient per Urology   -Continue Ceftin for 2 more days to complete a total 2-week course.  Discussed with infectious disease and urology PA, no need for antibiotics beyond 2 weeks.    Paroxysmal atrial fibrillation.  -Converted to normal sinus rhythm, this was transient in the setting of sepsis.     Thrombocytopenia in the setting of severe sepsis  Patient was also on subcu heparin with which might have contributed to thrombocytopenia.  -HIT panel- negative.  -Hematology following; platelet counts improved to normal  -Aspirin restarted 3/16     Acute encephalopathy most likely metabolic and infectious causes secondary to sepsis.  Dysphagia.  -head without contrast on 3/13/2021 showed no acute findings.   -Followed by speech pathology.  Currently on a dysphagia 1 diet.  -Currently on calorie count. Discussed with RD, patient  meeting about 50% of his caloric needs in the past 3 days(on average). Patient is very opposed to G-tube. We will therefore proceed with discontinuing tube feedings and continue to encourage oral intake while continuing calorie counts.    Hypernatremia.  Fluid overload   -Hypernatremia resolved     History of COPD   -Currently stable  -Continue duo nebs     Hyperlipidemia  -Continue statin     Dysphagia;  -Speech pathology following.  -Dysphagia 1 diet along with tube feeds for now as described above.        History of depression  Continue paroxetine     History of CVA with chronic left-sided weakness;  -Patient to discharge back to his long-term care facility as he is at baseline wheelchair-bound and total cares       Diet: Combination Diet Dysphagia Diet Level 1: Pureed; Honey Thickened Liquids (pre-thickened or use instant food thickener) (By spoon)  Adult Formula Drip Feeding: Continuous Isosource 1.5; Nasogastric tube; Goal Rate: 70; mL/hr; From: 7:00 PM; 7:00 AM; Medication - Feeding Tube Flush Frequency: At least 15-30 mL water before and after medication administration and with tube clogg...  Calorie Counts  Snacks/Supplements Adult: Magic Cup; With Meals     DVT Prophylaxis: Pneumatic Compression Devices   Cardona Catheter: in place, indication: Retention  Code Status: No CPR- Do NOT Intubate     Disposition Plan    Expected discharge: 1-2 days, to prior living situation if able to maintain adequate caloric intake  Entered: Romulo Cavanaugh MD 03/20/2021, 11:16 AM        The patient's care was discussed with the Bedside Nurse and Patient.  And spouse Yuli on the phone    Romulo Cavanaugh MD  Hospitalist Service  [facility]    ______________________________________________________________________    Interval History   Patient overall feels much better. He thinks he is eating better than last few days. No nausea vomiting    Data reviewed today: I reviewed all medications, new labs and  imaging results over the last 24 hours. I personally reviewed no images or EKG's today.    Physical Exam   Vital signs:  Temp: 98.8  F (37.1  C) Temp src: Oral BP: 138/80 Pulse: 95   Resp: 18 SpO2: 93 % O2 Device: None (Room air) Oxygen Delivery: 1 LPM Height: 182.9 cm (6') Weight: 107 kg (235 lb 14.3 oz)  Estimated body mass index is 31.99 kg/m  as calculated from the following:    Height as of this encounter: 1.829 m (6').    Weight as of this encounter: 107 kg (235 lb 14.3 oz).      Wt Readings from Last 2 Encounters:   03/20/21 107 kg (235 lb 14.3 oz)   02/08/21 122.5 kg (270 lb)       Gen: AAOX3, NAD  HEENT:  no pallor  Resp: CTA B/L, normal WOB  CVS: RRR, no murmur  Abd/GI: Soft, non-tender. BS- normoactive.   Skin: Warm, dry no rashes  MSK: Trace pedal edema  Neuro- CN- intact.  Chronic left-sided weakness      Data   Recent Labs   Lab 03/20/21  0705 03/19/21  0636 03/18/21  0627 03/17/21  0615 03/16/21  0600 03/15/21  0605   WBC 11.0 9.8 8.3 7.8 7.6 5.2   HGB 10.1* 9.9* 9.8* 10.4* 10.3* 10.7*   MCV 93 93 93 91 92 91    218 172 151 100* 73*   NA  --   --  139  --  145* 145*   POTASSIUM  --   --  4.2 4.1 3.5 3.6   CHLORIDE  --   --  104  --  107 109   CO2  --   --  36*  --  38* 37*   BUN  --   --  24  --  21 23   CR  --   --  0.71  --  0.62* 0.65*   ANIONGAP  --   --  <1*  --  <1* <1*   RAJ  --   --  7.6*  --  7.4* 7.6*   GLC  --   --  126*  --  119* 107*       No results found for this or any previous visit (from the past 24 hour(s)).  Medications     dextrose       - MEDICATION INSTRUCTIONS -       - MEDICATION INSTRUCTIONS -         acetaminophen  650 mg Oral Q6H     allopurinol  300 mg Oral QPM     anticoagulant citrate  5-10 mL Intracatheter Q24H     aspirin  325 mg Oral or Feeding Tube Daily     atorvastatin  10 mg Oral QPM     cefuroxime  500 mg Oral Q12H FERNANDA     metoprolol  12.5 mg Oral or Feeding Tube BID     nystatin  500,000 Units Swish & Spit 4x Daily     PARoxetine  20 mg Oral QPM      senna-docusate  1 tablet Oral At Bedtime

## 2021-03-20 NOTE — PLAN OF CARE
forgetful. Garbled speech. Left sided weakness from previous CVA.   BEHAVIOR & AGGRESSION TOOL COLOR: Green  ABNL VS/O2: VSS on RA  MOBILITY: Total cares, T&R every 2 hours. Up with lift.   PAIN MANAGMENT: Denies pain. Scheduled Tylenol  DIET: DD1 with HTL's. Meds given through feeding tube. TF 7pm -7am  BOWEL/BLADDER: Chronic chirinos for retention patent, good output.   ABNL LAB/BG:   DRAIN/DEVICES: R CVC  TELEMETRY RHYTHM: NA  SKIN: scabs on lower lip. Scattered bruising/scabs, left groin bruised. Scabbed area on scrotum.1+ generalized edema, 2+ BUE edema  TESTS/PROCEDURES: none  D/C DAY/GOALS/PLACE: Discharge pending 2-3 days  Pt to follow up with urology outpatient per note   OTHER IMPORTANT INFO: Contact precautions maintained for VRE. On calorie count, total feed,

## 2021-03-21 LAB
APTT PPP: 26 SEC (ref 22–37)
ERYTHROCYTE [DISTWIDTH] IN BLOOD BY AUTOMATED COUNT: 17.1 % (ref 10–15)
HCT VFR BLD AUTO: 32.9 % (ref 40–53)
HGB BLD-MCNC: 9.9 G/DL (ref 13.3–17.7)
MCH RBC QN AUTO: 27.7 PG (ref 26.5–33)
MCHC RBC AUTO-ENTMCNC: 30.1 G/DL (ref 31.5–36.5)
MCV RBC AUTO: 92 FL (ref 78–100)
PLATELET # BLD AUTO: 273 10E9/L (ref 150–450)
RBC # BLD AUTO: 3.57 10E12/L (ref 4.4–5.9)
WBC # BLD AUTO: 9 10E9/L (ref 4–11)

## 2021-03-21 PROCEDURE — 85027 COMPLETE CBC AUTOMATED: CPT | Performed by: INTERNAL MEDICINE

## 2021-03-21 PROCEDURE — 99232 SBSQ HOSP IP/OBS MODERATE 35: CPT | Performed by: INTERNAL MEDICINE

## 2021-03-21 PROCEDURE — 250N000009 HC RX 250: Performed by: INTERNAL MEDICINE

## 2021-03-21 PROCEDURE — 85730 THROMBOPLASTIN TIME PARTIAL: CPT | Performed by: INTERNAL MEDICINE

## 2021-03-21 PROCEDURE — 250N000013 HC RX MED GY IP 250 OP 250 PS 637: Performed by: INTERNAL MEDICINE

## 2021-03-21 PROCEDURE — 120N000001 HC R&B MED SURG/OB

## 2021-03-21 RX ADMIN — ATORVASTATIN CALCIUM 10 MG: 10 TABLET, FILM COATED ORAL at 20:27

## 2021-03-21 RX ADMIN — NYSTATIN 500000 UNITS: 100000 SUSPENSION ORAL at 20:27

## 2021-03-21 RX ADMIN — ANTICOAGULANT CITRATE DEXTROSE SOLUTION FORMULA A 5 ML: 12.25; 11; 3.65 SOLUTION INTRAVENOUS at 06:15

## 2021-03-21 RX ADMIN — CEFUROXIME AXETIL 500 MG: 500 TABLET ORAL at 20:27

## 2021-03-21 RX ADMIN — Medication 12.5 MG: at 20:27

## 2021-03-21 RX ADMIN — ALLOPURINOL 300 MG: 300 TABLET ORAL at 20:27

## 2021-03-21 RX ADMIN — NYSTATIN 500000 UNITS: 100000 SUSPENSION ORAL at 08:40

## 2021-03-21 RX ADMIN — ASPIRIN 325 MG ORAL TABLET 325 MG: 325 PILL ORAL at 08:40

## 2021-03-21 RX ADMIN — Medication 12.5 MG: at 08:42

## 2021-03-21 RX ADMIN — PAROXETINE HYDROCHLORIDE 20 MG: 20 TABLET, FILM COATED ORAL at 20:27

## 2021-03-21 RX ADMIN — NYSTATIN 500000 UNITS: 100000 SUSPENSION ORAL at 12:31

## 2021-03-21 RX ADMIN — NYSTATIN 500000 UNITS: 100000 SUSPENSION ORAL at 17:10

## 2021-03-21 RX ADMIN — CEFUROXIME AXETIL 500 MG: 500 TABLET ORAL at 08:40

## 2021-03-21 RX ADMIN — SENNOSIDES AND DOCUSATE SODIUM 1 TABLET: 8.6; 5 TABLET ORAL at 20:27

## 2021-03-21 ASSESSMENT — ACTIVITIES OF DAILY LIVING (ADL)
ADLS_ACUITY_SCORE: 25
ADLS_ACUITY_SCORE: 25
ADLS_ACUITY_SCORE: 22
ADLS_ACUITY_SCORE: 25

## 2021-03-21 ASSESSMENT — MIFFLIN-ST. JEOR: SCORE: 1825

## 2021-03-21 NOTE — PLAN OF CARE
Summary: Urosepsis secondary to R sided kidney stone  DATE & TIME: 3/21/2021 9007-7415  COGNITIVE CONCERNS/ORIENTATION: A/O, forgetful, Garbled speech at times.    BEHAVIOR & AGGRESSION TOOL COLOR: Green  ABNL VS/O2: VSS on RA  MOBILITY: Total cares, T&R every 2 hours. Up with lift. Left sided weakness from previous CVA.  PAIN MANAGMENT: Denies pain  DIET: DD1 with honey-thickened liquids. Meds  given crushed with pudding.  BOWEL/BLADDER: Chronic chirinos for retention patent, good output.   ABNL LAB/BG: None new.  DRAIN/DEVICES: R CVC and chirinos  TELEMETRY RHYTHM: NA  SKIN: scabs on lower lip. Scattered bruising/scabs, left groin bruised. Scabbed area on scrotum.1+ generalized edema, Mepilex on coccyx,  back and right leg changed  TESTS/PROCEDURES: none  D/C DAY/GOALS/PLACE: possible discharge tomorrow, Home with HC  OTHER IMPORTANT INFO: Contact precautions maintained for VRE. Continues on  calorie count, feeder, appetite fair.speech following

## 2021-03-21 NOTE — PLAN OF CARE
Summary: Urosepsis secondary to R sided kidney stone  DATE & TIME: 3/20/2021 1900 - 0700  COGNITIVE CONCERNS/ORIENTATION: A/O, forgetful, Garbled speech at times.    BEHAVIOR & AGGRESSION TOOL COLOR: Green  ABNL VS/O2: VSS on RA  MOBILITY: Total cares, T&R every 2 hours. Up with lift. Left sided weakness from previous CVA.  PAIN MANAGMENT: Denies pain  DIET: DD1 with honey-thickened liquids. Meds  given crushed with pudding.  BOWEL/BLADDER: Chronic chirinos for retention patent, good output.   ABNL LAB/BG: None new.  DRAIN/DEVICES: R CVC  TELEMETRY RHYTHM: NA  SKIN: scabs on lower lip. Scattered bruising/scabs, left groin bruised. Scabbed area on scrotum.1+ generalized edema, 2+ BUE edema. Mepilex intact  TESTS/PROCEDURES: none  D/C DAY/GOALS/PLACE: Discharge pending 2-3 days   OTHER IMPORTANT INFO: Contact precautions maintained for VRE. calorie count,Discontinued Ng tube and tube feedings, feeder, appetite fair.speech following

## 2021-03-21 NOTE — PROGRESS NOTES
CALORIE COUNT      Approximate Oral Intake for:    3/20/21  Calories:  800 kcal   Protein:  20 grams       Intake from TF/PN:       FT was pulled yesterday (seen 3/20/21 RD note for details)      Estimated Needs:    Dosing Weight:  87.4 kg   Estimated Energy Needs: 2059-9186 kcals (25-30 Kcal/Kg)  Justification: obese  Estimated Protein Needs: 105-130 grams protein (1.2-1.5 g pro/Kg)  Justification: hypercatabolism with acute illness    Nancy Reis, RD, LD, CNSC   Clinical Dietitian - [facility]

## 2021-03-21 NOTE — PROGRESS NOTES
[facility]    Medicine Progress Note - Hospitalist Service        Date of Admission:  3/9/2021  3:23 AM    Assessment & Plan:   Ron Gilmore is a 80 year old male with history of diet-controlled diabetes mellitus, history of CVA with chronic left-sided weakness, chronic urinary retention needing chronic indwelling Chirinos catheter,  COPD on 3 to 4 L of oxygen by nasal cannula presented to the hospital with shaking chills and severe sepsis secondary to right UVJ ureterolithiasis with obstruction and hydronephrosis and subsequently was found to be bacteremic with E. Coli.     Septic shock secondary to E. coli bacteremia.  Right-sided UVJ stone with hydronephrosis status post right-sided percutaneous nephrostomy tube placement by IR  -Initially admitted to the ICU in septic shock  -Altogether has received 8 days of IV antibiotics and was transitioned to oral Ceftin  -S/p Right percutanenous tube removal and IR placement of an antegrade R ureteral stent on 3/16/21  -chronic chirinos in place   -eventual cystoscopy with ureteroscopy and lithotripsy for stone removal in 2-3 wks as outpatient per Urology   -Continue Ceftin for 1 more days to complete a total 2-week course.  Discussed with infectious disease and urology PA, no need for antibiotics beyond 2 weeks.    Paroxysmal atrial fibrillation.  -Converted to normal sinus rhythm, this was transient in the setting of sepsis.     Thrombocytopenia in the setting of severe sepsis  Patient was also on subcu heparin with which might have contributed to thrombocytopenia.  -HIT panel- negative.  -Hematology following; platelet counts improved to normal  -Aspirin restarted 3/16     Acute encephalopathy most likely metabolic and infectious causes secondary to sepsis.  Dysphagia.  -head without contrast on 3/13/2021 showed no acute findings.   -Followed by speech pathology. Currently on a dysphagia 1 diet.  -NG tube removed yesterday.  Appears to have adequate oral  intake at the moment.  Continue to monitor    Hypernatremia.  Fluid overload   -Hypernatremia resolved     History of COPD   -Currently stable  -Continue duo nebs     Hyperlipidemia  -Continue statin     Dysphagia;  -Speech pathology following.  -Dysphagia 1 diet along with tube feeds for now as described above.        History of depression  Continue paroxetine     History of CVA with chronic left-sided weakness;  -Patient to discharge back to his long-term care facility as he is at baseline wheelchair-bound and total cares       Diet: Combination Diet Dysphagia Diet Level 1: Pureed; Honey Thickened Liquids (pre-thickened or use instant food thickener) (By spoon)  Snacks/Supplements Adult: Magic Cup; With Meals     DVT Prophylaxis: Pneumatic Compression Devices   Cardona Catheter: in place, indication: Retention  Code Status: No CPR- Do NOT Intubate     Disposition Plan    Expected discharge: Probably 3/22, to home  Entered: Romulo Cavanaugh MD 03/21/2021, 12:21 PM        The patient's care was discussed with the Bedside Nurse and Patient.  And spouse Yuli on the phone    Romulo Cavanaugh MD  Hospitalist Service  [facility]    ______________________________________________________________________    Interval History   NG tube removed yesterday.  Patient appears to be eating satisfactorily.  No fever.  No nausea or vomiting.    Data reviewed today: I reviewed all medications, new labs and imaging results over the last 24 hours. I personally reviewed no images or EKG's today.    Physical Exam   Vital signs:  Temp: 97.8  F (36.6  C) Temp src: Oral BP: (!) 149/75 Pulse: 82   Resp: 18 SpO2: 95 % O2 Device: None (Room air) Oxygen Delivery: 1 LPM Height: 182.9 cm (6') Weight: 106.7 kg (235 lb 3.7 oz)  Estimated body mass index is 31.9 kg/m  as calculated from the following:    Height as of this encounter: 1.829 m (6').    Weight as of this encounter: 106.7 kg (235 lb 3.7 oz).      Wt Readings from Last 2  Encounters:   03/21/21 106.7 kg (235 lb 3.7 oz)   02/08/21 122.5 kg (270 lb)       Gen: AAOX3, NAD  Resp: CTA B/L, normal WOB  CVS: RRR, no murmur  Abd/GI: Soft, non-tender. BS- normoactive.   Skin: Warm, dry no rashes  MSK: Trace pedal edema  Neuro- CN- intact.  Chronic left-sided weakness      Data   Recent Labs   Lab 03/21/21  0615 03/20/21  0705 03/19/21  0636 03/18/21  0627 03/17/21  0615 03/16/21  0600 03/15/21  0605   WBC 9.0 11.0 9.8 8.3 7.8 7.6 5.2   HGB 9.9* 10.1* 9.9* 9.8* 10.4* 10.3* 10.7*   MCV 92 93 93 93 91 92 91    248 218 172 151 100* 73*   NA  --   --   --  139  --  145* 145*   POTASSIUM  --   --   --  4.2 4.1 3.5 3.6   CHLORIDE  --   --   --  104  --  107 109   CO2  --   --   --  36*  --  38* 37*   BUN  --   --   --  24  --  21 23   CR  --   --   --  0.71  --  0.62* 0.65*   ANIONGAP  --   --   --  <1*  --  <1* <1*   RAJ  --   --   --  7.6*  --  7.4* 7.6*   GLC  --   --   --  126*  --  119* 107*       No results found for this or any previous visit (from the past 24 hour(s)).  Medications     dextrose       - MEDICATION INSTRUCTIONS -       - MEDICATION INSTRUCTIONS -         acetaminophen  650 mg Oral Q6H     allopurinol  300 mg Oral QPM     anticoagulant citrate  5-10 mL Intracatheter Q24H     aspirin  325 mg Oral or Feeding Tube Daily     atorvastatin  10 mg Oral QPM     cefuroxime  500 mg Oral Q12H FERNANDA     metoprolol  12.5 mg Oral or Feeding Tube BID     nystatin  500,000 Units Swish & Spit 4x Daily     PARoxetine  20 mg Oral QPM     senna-docusate  1 tablet Oral At Bedtime

## 2021-03-22 VITALS
HEART RATE: 88 BPM | SYSTOLIC BLOOD PRESSURE: 119 MMHG | HEIGHT: 72 IN | OXYGEN SATURATION: 90 % | WEIGHT: 235.9 LBS | BODY MASS INDEX: 31.95 KG/M2 | DIASTOLIC BLOOD PRESSURE: 60 MMHG | RESPIRATION RATE: 18 BRPM | TEMPERATURE: 98.2 F

## 2021-03-22 LAB
ANION GAP SERPL CALCULATED.3IONS-SCNC: 2 MMOL/L (ref 3–14)
APTT PPP: 28 SEC (ref 22–37)
BUN SERPL-MCNC: 27 MG/DL (ref 7–30)
CALCIUM SERPL-MCNC: 8.6 MG/DL (ref 8.5–10.1)
CHLORIDE SERPL-SCNC: 102 MMOL/L (ref 94–109)
CO2 SERPL-SCNC: 32 MMOL/L (ref 20–32)
CREAT SERPL-MCNC: 0.69 MG/DL (ref 0.66–1.25)
ERYTHROCYTE [DISTWIDTH] IN BLOOD BY AUTOMATED COUNT: 17.1 % (ref 10–15)
GFR SERPL CREATININE-BSD FRML MDRD: >90 ML/MIN/{1.73_M2}
GLUCOSE SERPL-MCNC: 107 MG/DL (ref 70–99)
HCT VFR BLD AUTO: 33 % (ref 40–53)
HGB BLD-MCNC: 9.9 G/DL (ref 13.3–17.7)
MAGNESIUM SERPL-MCNC: 2.2 MG/DL (ref 1.6–2.3)
MCH RBC QN AUTO: 27.5 PG (ref 26.5–33)
MCHC RBC AUTO-ENTMCNC: 30 G/DL (ref 31.5–36.5)
MCV RBC AUTO: 92 FL (ref 78–100)
PHOSPHATE SERPL-MCNC: 3.4 MG/DL (ref 2.5–4.5)
PLATELET # BLD AUTO: 335 10E9/L (ref 150–450)
POTASSIUM SERPL-SCNC: 4.5 MMOL/L (ref 3.4–5.3)
RBC # BLD AUTO: 3.6 10E12/L (ref 4.4–5.9)
SODIUM SERPL-SCNC: 136 MMOL/L (ref 133–144)
WBC # BLD AUTO: 9.6 10E9/L (ref 4–11)

## 2021-03-22 PROCEDURE — 250N000013 HC RX MED GY IP 250 OP 250 PS 637: Performed by: INTERNAL MEDICINE

## 2021-03-22 PROCEDURE — 84100 ASSAY OF PHOSPHORUS: CPT | Performed by: INTERNAL MEDICINE

## 2021-03-22 PROCEDURE — 80048 BASIC METABOLIC PNL TOTAL CA: CPT | Performed by: INTERNAL MEDICINE

## 2021-03-22 PROCEDURE — 250N000009 HC RX 250: Performed by: INTERNAL MEDICINE

## 2021-03-22 PROCEDURE — 99239 HOSP IP/OBS DSCHRG MGMT >30: CPT | Performed by: INTERNAL MEDICINE

## 2021-03-22 PROCEDURE — 85730 THROMBOPLASTIN TIME PARTIAL: CPT | Performed by: INTERNAL MEDICINE

## 2021-03-22 PROCEDURE — 999N000040 HC STATISTIC CONSULT NO CHARGE VASC ACCESS

## 2021-03-22 PROCEDURE — 85027 COMPLETE CBC AUTOMATED: CPT | Performed by: INTERNAL MEDICINE

## 2021-03-22 PROCEDURE — 83735 ASSAY OF MAGNESIUM: CPT | Performed by: INTERNAL MEDICINE

## 2021-03-22 RX ORDER — POLYETHYLENE GLYCOL 3350 17 G/17G
17 POWDER, FOR SOLUTION ORAL DAILY
Qty: 510 G | COMMUNITY
Start: 2021-03-22 | End: 2023-09-20

## 2021-03-22 RX ORDER — METOPROLOL TARTRATE 25 MG/1
12.5 TABLET, FILM COATED ORAL 2 TIMES DAILY
Start: 2021-03-22

## 2021-03-22 RX ADMIN — Medication 12.5 MG: at 09:07

## 2021-03-22 RX ADMIN — ANTICOAGULANT CITRATE DEXTROSE SOLUTION FORMULA A 5 ML: 12.25; 11; 3.65 SOLUTION INTRAVENOUS at 06:50

## 2021-03-22 RX ADMIN — NYSTATIN 500000 UNITS: 100000 SUSPENSION ORAL at 09:10

## 2021-03-22 RX ADMIN — CEFUROXIME AXETIL 500 MG: 500 TABLET ORAL at 09:06

## 2021-03-22 RX ADMIN — ASPIRIN 325 MG ORAL TABLET 325 MG: 325 PILL ORAL at 09:06

## 2021-03-22 ASSESSMENT — ACTIVITIES OF DAILY LIVING (ADL)
ADLS_ACUITY_SCORE: 21
ADLS_ACUITY_SCORE: 24

## 2021-03-22 ASSESSMENT — MIFFLIN-ST. JEOR: SCORE: 1828.04

## 2021-03-22 NOTE — PROGRESS NOTES
Care Management Discharge Note    Discharge Date: 03/22/21       Discharge Disposition: Home    Discharge Services:[facility] RN    Discharge DME: Bed, Wheelchair(has these things at home already)    Discharge Transportation: Stretcher thru [facility] set-up for 3:00 pm today    Private pay costs discussed: transportation costs    PAS Confirmation Code: NA    Patient/family educated on Medicare website which has current facility and service quality ratings: NA     Education Provided on the Discharge Plan: yes   Persons Notified of Discharge Plans: Pt, spouse, NSG  Patient/Family in Agreement with the Plan:  yes    Handoff Referral Completed: Yes, pending    Additional Information:  Pt is discharging today to his home.  Conversed with his Spouse Yuli and she is ready for pt to return.  Transportation has been set for 3:00 pm.    Pt will need new home care orders for home RN.  New meds to be filled at [facility] at [facility], [place]  Yuli prefers to schedule follow up appointments.        Valery Lima RN   Inpatient Care Management  [phone]

## 2021-03-22 NOTE — PLAN OF CARE
Discharge    Patient discharged to home via stretcher with transport.    COGNITIVE CONCERNS/ORIENTATION: A/O, forgetful at times.  BEHAVIOR & AGGRESSION TOOL COLOR: Green  ABNL VS/O2: VSS on RA  MOBILITY: Total cares, T&R every 2 hours. Up with lift. Left sided weakness from previous CVA.  PAIN MANAGMENT: Denies pain  DIET: DD1 with honey-thickened liquids. Meds given crushed with pudding.  BOWEL/BLADDER: Chronic chirinos for retention patent, good output.   ABNL LAB/BG: None new.  DRAIN/DEVICES: R CVC removed for discharge, and chirinos is chronic.  TELEMETRY RHYTHM: NA  SKIN: scabs on lower lip. Scattered bruising/scabs, left groin bruised. Scabbed area on scrotum. Mepilex on coccyx, back and right leg clean/intact  TESTS/PROCEDURES: none  D/C DAY/GOALS/PLACE:discharge today, Home with HC  OTHER IMPORTANT INFO: Contact precautions maintained for VRE. Continues on  calorie count, feeder, appetite fair.speech following    Reviewed AVS and medications with daughter via telephone. Wife placed daughter on the phone when writer explained intentions for after care instructions. Daughter understands that diet is DD1 with honey-thick liquids and that any changes to this diet will be dictated by patient's PCP going forward. Dietary is sending a packaged meal at discharge, due to limited resources described by family for this evening's transition.     Family understands that Urology will follow up and that a contact number for the clinic is attached to the AVS. An EKG monitor will be placed prior to discharge and family is also aware of this.  Wound care instructions have been copied into discharge instructions for continuity of care.Teach back method utilized for review all of after care plans described above    Listed belongings gathered and returned to patient. Yes  Care Plan and Patient education resolved: Yes  Prescriptions if needed, hard copies sent with patient  NA  Home and hospital acquired medications returned to  patient: NA  Medication Bin checked and emptied on discharge Yes  Follow up appointment made for patient: Yes

## 2021-03-22 NOTE — PLAN OF CARE
Speech Language Therapy Discharge Summary    Reason for therapy discharge:    Discharged to home with home therapy.    Progress towards therapy goal(s). See goals on Care Plan in [facility] electronic health record for goal details.  Goals not met.  Barriers to achieving goals:   discharge from facility.    Therapy recommendation(s):    Continued therapy is recommended.  Rationale/Recommendations:  patient discharged on Recommend: 1. DDL 1 with honey thick liquids 2. 1:1 supervision/assistance, small bites, swallow hard x2 and alternate liquids/solids. HH SLP recommended for ongoing diet management, least restrictive diet, and education.

## 2021-03-22 NOTE — PROGRESS NOTES
[facility]  Spoke with patient to discuss plans for HC. Patient to be discharged home today and has agreed to have [facility] follow with RN and [facility] support [place] processing referral. Patient verbalized understanding that initial visit is scheduled for 3/25/21. Patient has 24 hour phone number for [facility] for any questions or concerns.    Colleen Perrin RN   [facility] Care Liaison   [phone]

## 2021-03-22 NOTE — PROGRESS NOTES
CALORIE COUNT    Current Diet Order:   DD1 + Honey thick liquids    Supplement Order:   Magic cup w/ meals     Approximate Oral Intake for 3/21:   Calories: 2036 kcal  Protein: 74 g pro    Number of Meals Recorded: 3  Number of Snacks Recorded: 3    Enteral NS Order:   Off since 3/20 w/ FT removal     ASSESSED NUTRITION NEEDS:  Dosing Weight:  87.4 kg   Estimated Energy Needs: 0418-9833 kcals (25-30 Kcal/Kg)  Justification: obese  Estimated Protein Needs: 105-130 grams protein (1.2-1.5 g pro/Kg)  Justification: hypercatabolism with acute illness    Summary:     - Met 93% energy needs and 70% protein needs yesterday in 3 meals, 3 snacks.   - on 3/20 pt only met 36% energy and 19% protein needs.   - Calorie counts will continue through this evening.     Debbie Harper RD, LD  [facility] [place], 68, 57, [facility]   Pager: [phone]  Weekend Pager: [phone]

## 2021-03-22 NOTE — DISCHARGE SUMMARY
[facility]    Discharge Summary  Hospitalist    Date of Admission:  3/9/2021  Date of Discharge:  3/22/2021  Discharging Provider: Romulo Cavanaugh MD    Discharge Diagnoses      Septic shock secondary to E. coli bacteremia.  Right-sided UVJ stone with hydronephrosis status post right-sided percutaneous nephrostomy tube placement with subsequent antegrade ureteral stent placement.  Paroxysmal atrial fibrillation.  Thrombocytopenia in the setting of severe sepsis  Acute encephalopathy most likely metabolic and infectious causes secondary to sepsis.  Dysphagia.  Hypernatremia.  Fluid overload   History of COPD   Hyperlipidemia  Dysphagia  History of depression  History of CVA with chronic left-sided weakness      Hospital Course:    Ron Gilmore is a 80 year old male with history of diet-controlled diabetes mellitus, history of CVA with chronic left-sided weakness, chronic urinary retention needing chronic indwelling Chirinos catheter,  COPD reportedly on 3 to 4 L of oxygen by nasal cannula presented to the hospital with shaking chills and severe sepsis secondary to right UVJ ureterolithiasis with obstruction and hydronephrosis and subsequently was found to be bacteremic with E. Coli.     Septic shock secondary to E. coli bacteremia.  Right-sided UVJ stone with hydronephrosis status post right-sided percutaneous nephrostomy tube placement by IR  -Initially admitted to the ICU in septic shock  -Blood culture positive for E. coli which was pansensitive  -S/p Right percutanenous tube removal and IR placement of an antegrade R ureteral stent on 3/16/21  -Continue chronic chirinos    -eventual cystoscopy with ureteroscopy and lithotripsy for stone removal in 1-2 wks as outpatient per Urology   -Initially admitted to the ICU in septic shock, over the course of the hospitalization improved significantly.  Completed 2-weeks of antibiotics(8 days of IV followed by oral Ceftin for 6 days) on 3/22 in the  hospital.  No further antibiotics at discharge.  Please see my previous note for antibiotic plan.     Paroxysmal atrial fibrillation.  -This was in the ICU in the setting of septic shock.  It was transient and he converted to normal sinus rhythm and maintained that for the course of his hospitalization.   -Patient already has a loop recorder in place and has a device check coming up.  He can be evaluated at that time for potential burden of A. Fib(if found) and accordingly intervened.     Thrombocytopenia in the setting of severe sepsis  Patient was also on subcu heparin with which might have contributed to thrombocytopenia.  -HIT panel- negative.  -Hematology followed, platelet counts improved to normal  -Aspirin restarted 3/16     Acute encephalopathy most likely metabolic and infectious causes secondary to sepsis.  Dysphagia.  -head without contrast on 3/13/2021 showed no acute findings.   -Ongoing issues with dysphagia during the hospital stay made worse by encephalopathic from septic process.  -Followed by speech pathology.  There was concerns about him able to meet caloric needs, he was on a calorie count, subsequently diet advanced to dysphagia 1 diet.  -Initially was on NG feeding, however he was able to maintain his caloric needs therefore tube feeding was discontinued prior to discharge.       Hypernatremia.  Fluid overload   -Hypernatremia resolved  -Euvolemic at the moment.     History of COPD   -Currently stable, not requiring any supplemental oxygen.  -Continue duo nebs     Hyperlipidemia  -Continue statin     Dysphagia;  -Dysphagia 1 diet         History of depression  Continue paroxetine     History of CVA with chronic left-sided weakness  -pt will discharge home.  We will arrange home RN and speech therapy    Romulo Cavanaugh MD    Significant Results and Procedures   See below    Pending Results     Unresulted Labs Ordered in the Past 30 Days of this Admission     No orders found from 2/7/2021 to  3/10/2021.          Code Status   DNR / DNI       Primary Care Physician   Augustin Thomas    Physical Exam   Temp: 98.2  F (36.8  C) Temp src: Oral BP: 119/60 Pulse: 88   Resp: 18 SpO2: 90 % O2 Device: None (Room air)      Constitutional: AAOX3, NAD  Respiratory: CTA B/L, Normal WOB  Cardiovascular: RRR, No murmur  GI: Soft, Non- tender, BS- normoactive  Neuro: CN- grossly intact    Discharge Disposition   Discharged to home  Condition at discharge: Stable    Consultations This Hospital Stay   SWALLOW EVAL SPEECH PATH AT BEDSIDE IP CONSULT  UROLOGY IP CONSULT  SWALLOW EVAL SPEECH PATH AT BEDSIDE IP CONSULT  INTENSIVIST IP CONSULT  PHARMACY TO DOSE TOBRAMYCIN  WOUND OSTOMY CONTINENCE NURSE  IP CONSULT  NUTRITION SERVICES ADULT IP CONSULT  PHARMACY IP CONSULT  HEMATOLOGY IP CONSULT  PHARMACY IP CONSULT  PHYSICAL THERAPY ADULT IP CONSULT  OCCUPATIONAL THERAPY ADULT IP CONSULT  SOCIAL WORK IP CONSULT  PHARMACY IP CONSULT  PHARMACY TO DOSE ARGATROBAN  CARE MANAGEMENT / SOCIAL WORK IP CONSULT  NUTRITION SERVICES ADULT IP CONSULT  SOCIAL WORK IP CONSULT  CARE MANAGEMENT / SOCIAL WORK IP CONSULT    Time Spent on this Encounter      IRomulo MD, personally saw the patient today and spent greater than 30 minutes discharging this patient.    Discharge Orders      Home care nursing referral      Tubes and drains    You have a stent in your ureter. This stent is not permanent. It is very important that you return to your urologist to have your stent removed.     While the stent is in place you may expect some urinary urgency, frequency, and blood in your urine. You may also have some pain in your back or side while urinating. Avoid activities that make these symptoms worse. If these symptoms become severe or unbearable please contact our office.     Follow-up and recommended labs and tests     You will have a follow up appointment with Dr. Sullivan in 1 week to remove your kidney stone. Our office will call you  specific day and time.    [facility], a [facility]  [address] [place] [address]. [phone]  You may call [phone] with any questions or concerns.   Central Appointment #: [phone]     Follow-up and recommended labs and tests    Follow up with primary care provider, Augustin Thomas, within 7 days for hospital follow- up.     Activity    Your activity upon discharge: activity as tolerated     MD face to face encounter    Documentation of Face to Face and Certification for Home Health Services    I certify that patient: Ron Gilmore is under my care and that I, or a nurse practitioner or physician's assistant working with me, had a face-to-face encounter that meets the physician face-to-face encounter requirements with this patient on: 3/22/2021.    This encounter with the patient was in whole, or in part, for the following medical condition, which is the primary reason for home health care: Deconditioning.    I certify that, based on my findings, the following services are medically necessary home health services: Nursing.    My clinical findings support the need for the above services because: Nurse is needed: To provide assessment and oversight required in the home to assure adherence to the medical plan due to: Deconditioning.    Further, I certify that my clinical findings support that this patient is homebound (i.e. absences from home require considerable and taxing effort and are for medical reasons or [employer] services or infrequently or of short duration when for other reasons) because: Requires assistance of another person or specialized equipment to access medical services because patient:  Deconditioning.    Based on the above findings. I certify that this patient is confined to the home and needs intermittent skilled nursing care, physical therapy and/or speech therapy.  The patient is under my care, and I have initiated the establishment of the plan of care.   This patient will be followed by a physician who will periodically review the plan of care.  Physician/Provider to provide follow up care: Augustin Thomas    Attending hospital physician (the Medicare certified [facility] provider): Romulo Cavanaugh MD  Physician Signature: See electronic signature associated with these discharge orders.  Date: 3/22/2021     No CPR- Do NOT Intubate     Diet    Follow this diet upon discharge: Orders Placed This Encounter      Snacks/Supplements Adult: Magic Cup; With Meals      Calorie Counts      Combination Diet Dysphagia Diet Level 1: Pureed; Honey Thickened Liquids (pre-thickened or use instant food thickener) (By spoon)       Discharge Medications   Current Discharge Medication List      START taking these medications    Details   polyethylene glycol (MIRALAX) 17 GM/Dose powder Take 17 g by mouth daily  Qty: 510 g    Associated Diagnoses: Constipation, unspecified constipation type         CONTINUE these medications which have CHANGED    Details   metoprolol tartrate (LOPRESSOR) 25 MG tablet Take 0.5 tablets (12.5 mg) by mouth 2 times daily    Associated Diagnoses: Cerebrovascular accident (CVA), unspecified mechanism (H)         CONTINUE these medications which have NOT CHANGED    Details   acetaminophen (TYLENOL) 325 MG tablet Take 650 mg by mouth every 4 hours as needed for mild pain as needed for pain/fever Max dose 3000mg/24hr      allopurinol (ZYLOPRIM) 300 MG tablet Take 300 mg by mouth daily      aspirin (ASA) 325 MG EC tablet Take 1 tablet (325 mg) by mouth daily    Associated Diagnoses: Left arm weakness; Cerebrovascular accident (CVA), unspecified mechanism (H)      atorvastatin (LIPITOR) 10 MG tablet Take 10 mg by mouth daily      Emollient (AMLACTIN ULTRA EX) Externally apply topically daily as needed APPLY TO FEET       ipratropium - albuterol 0.5 mg/2.5 mg/3 mL 0.5-2.5 (3) MG/3ML IN neb solution Take 1 vial by nebulization every 4 hours as needed        PARoxetine (PAXIL) 20 MG tablet Take 20 mg by mouth daily           Allergies   No Known Allergies  Data   Most Recent 3 CBC's:  Recent Labs   Lab Test 03/22/21  0720 03/21/21  0615 03/20/21  0705   WBC 9.6 9.0 11.0   HGB 9.9* 9.9* 10.1*   MCV 92 92 93    273 248      Most Recent 3 BMP's:  Recent Labs   Lab Test 03/22/21  0720 03/18/21  0627 03/17/21  0615 03/16/21  0600    139  --  145*   POTASSIUM 4.5 4.2 4.1 3.5   CHLORIDE 102 104  --  107   CO2 32 36*  --  38*   BUN 27 24  --  21   CR 0.69 0.71  --  0.62*   ANIONGAP 2* <1*  --  <1*   RAJ 8.6 7.6*  --  7.4*   * 126*  --  119*     Most Recent 2 LFT's:  Recent Labs   Lab Test 03/10/21  0621 03/09/21  0346   AST 51* 14   ALT 25 13   ALKPHOS 67 68   BILITOTAL 1.2 1.1     Most Recent INR's and Anticoagulation Dosing History:  Anticoagulation Dose History     Recent Dosing and Labs Latest Ref Rng & Units 7/13/2006 1/14/2020 3/10/2021 3/12/2021    INR 0.86 - 1.14 1.01 1.13 1.58(H) 1.20(H)        Most Recent 3 Troponin's:  Recent Labs   Lab Test 01/14/20  2356 10/12/18  2032   TROPI <0.015 <0.015     Most Recent Cholesterol Panel:  Recent Labs   Lab Test 01/16/20  0735   CHOL 87   LDL 40   HDL 30*   TRIG 83     Most Recent 6 Bacteria Isolates From Any Culture (See EPIC Reports for Culture Details):  Recent Labs   Lab Test 03/09/21  1023 03/09/21  0416 03/09/21  0346 02/08/21  1554 02/08/21  1553 06/24/20  0830   CULT >100,000 colonies/mL  Escherichia coli  * >100,000 colonies/mL  Escherichia coli  *  Cultured on the 1st day of incubation:  Escherichia coli  Susceptibility testing done on previous specimen  *  Critical Value/Significant Value, preliminary result only, called to and read back by   Levi Meza RN @6745 3.9.21 LM   Cultured on the 1st day of incubation:  Escherichia coli  *  Critical Value/Significant Value, preliminary result only, called to and read back by  Suzy Rizzo RN at 8379 3.9.21 KZ    (Note)  POSITIVE for E.COLI by  Verigene multiplex nucleic acid test. Final  identification and antimicrobial susceptibility testing will be  verified by standard methods. Verigene test will not distinguish  E.coli from Shigella species including S.dysenteriae, S.flexneri,  S.boydii, and S.sonnei. Specimens containing Shigella species or  E.coli will be reported as Positive for E.coli.    Specimen tested with Verigene multiplex, gram-negative blood culture  nucleic acid test for the following targets: Acinetobacter sp.,  Citrobacter sp., Enterobacter sp., Proteus sp., E. coli, K.  pneumoniae/oxytoca, P. aeruginosa, and the following resistance  markers: CTXM, KPC, NDM, VIM, IMP and OXA.    Critical Value/Significant Value called to and read back by Levi Castañeda RN at 1954 on 3.9.21 CW     No growth No growth  >100,000 colonies/mL  Proteus mirabilis  *  <1000 colonies/mL  urogenital patty  Susceptibility testing not routinely done   >100,000 colonies/mL  Escherichia coli  *  >100,000 colonies/mL  Proteus mirabilis  *     Most Recent TSH, T4 and A1c Labs:  Recent Labs   Lab Test 02/08/21  1523   A1C 6.0*       Results for orders placed or performed during the hospital encounter of 03/09/21   XR Chest Port 1 View    Narrative    EXAM: XR CHEST PORT 1 VW  LOCATION: [facility]  DATE/TIME: 3/9/2021 4:13 AM    INDICATION: Fever  COMPARISON: 02/08/2021      Impression    IMPRESSION: Heart size is normal for technique. Thoracic aorta is tortuous. Recording device overlies the left chest. Lung volumes have diminished, with increasing hypoventilatory change. There is some asymmetric increased interstitial density in the   right lung, which could represent early infiltrate.   CT Abdomen Pelvis w/o Contrast    Narrative    CT ABDOMEN/PELVIS WITHOUT CONTRAST March 9, 2021 7:19 AM    CLINICAL HISTORY: Abdominal pain, fever. Hematuria, unknown cause.    TECHNIQUE: CT scan of the abdomen and pelvis was performed without IV  contrast. Multiplanar  reformats were obtained. Dose reduction  techniques were used.  CONTRAST: None.    COMPARISON: CT abdomen and pelvis 10/4/2017.    FINDINGS:   LOWER CHEST: Peribronchial wall thickening and patchy opacity at the  left lung base. Emphysematous changes. Mild atelectasis at the right  base. Diffuse coronary artery calcifications.    HEPATOBILIARY: Cholecystectomy. No acute liver abnormality at  unenhanced scanning.    PANCREAS: Normal.    SPLEEN: Normal.    ADRENAL GLANDS: Normal.    KIDNEYS/BLADDER: Distal right ureter stone at the pelvic inlet  measures 0.5 cm series 3 image 190. Two adjacent stones within the  bladder just at the right ureterovesical junction. One of these is 0.6  cm while the adjacent stone is 0.5 cm. There are four additional  bladder stones, one of these measuring up to 0.7 cm image 227. There  is moderate right hydronephrosis and right renal edema. Intrarenal  stones on the right as well. Largest stone at the lower right kidney  is 1.3 cm series 3 image 119.    BOWEL: There is an indeterminate wall thickening at distal rectal  level measuring an approximate craniocaudal length of 3.1 cm series 4  image 74, also see series 3 image 226. No obstruction. Remainder of  the bowel shows no acute abnormality.    LYMPH NODES: No enlarged lymph nodes are seen.    VASCULATURE: Calcifications noted. No aneurysm.    PELVIC ORGANS: Normal.    OTHER: No free fluid or free air.    MUSCULOSKELETAL: Right hip arthroplasty. Left hip DJD. Spine  degenerative changes. No destructive process.      Impression    IMPRESSION:   1.  Distal right ureter stone at the pelvic inlet. Two right  ureterovesical junction stones just in the bladder lumen. Associated  moderate right hydronephrosis.  2.  Multiple stones within the bladder and within the right kidney.  3.  Indeterminate wall thickening and decompression of the distal  rectum is identified. A rectal neoplasm could have this appearance and  further workup is  "recommended with direct visualization.  4.  Patchy airspace opacities at the left lung base. Correlate with  pneumonia.  5.  Coronary artery calcifications.    TODD GALICAI MD   IR Nephrostomy Tube Placement Right    Narrative    PROCEDURE:  1. Right antegrade nephrostogram  2. Placement of 10.2 Azeri percutaneous nephrostomy tube    DATE OF PROCEDURE: 3/8/2021.    MEDICATIONS: 1% lidocaine.    CONTRAST: 15 mL Isovue 300 into the renal collecting system.    FLUOROSCOPY TIME: 0.7 minutes.    AIR KERMA: 12.92 mGy.    COMPLICATIONS: None.    CLINICAL HISTORY/INDICATION: Distal right obstructing renal stone with  urosepsis.    PROCEDURES AND FINDINGS: Following a discussion of the risks,  benefits, indications and alternatives to treatment, appropriate  informed consent was obtained. The patient was brought to the  interventional radiology suite and placed on the table. The patient's  right flank was prepped and draped in a sterile fashion. A time out  was performed per universal protocol policy to ensure correct patient,  site and procedure to be performed.     A preliminary ultrasound of the right flank was performed which  demonstrates moderate. Ultrasound images were archived. Then, under  ultrasound guidance an appropriate site was marked for needle  placement and this region was subsequently infiltrated with 1%  Lidocaine for local anesthesia. Under direct ultrasound guidance, a  21-gauge needle was inserted into the lower pole of the dilated calyx  until purulent urine return was noted. A small amount of contrast was  then injected which demonstrates access into the renal collecting  system. 20 mL of purulent fluid was aspirated. Some of the fluid was  sent to lab for culture. A 0.018\" wire was then advanced through the  needle and coiled within the renal pelvis. The needle was then  exchanged for a coaxial Angel Luis set, which was advanced over the wire  through the calyx into the renal pelvis. The 0.018\" wire was " "placed to  the side as a safety wire and a 0.035\" guidewire was advanced into the  renal pelvis. The tract was serially dilated with final placement of a  10.2 Vietnamese nephrostomy tube with the tip coiled in the renal pelvis.  Injection of contrast demonstrates the pigtail is centered in the  renal pelvis. The contrast was aspirated and the tube was placed to  gravity bag drainage. The catheter was secured to the skin using a 2-0  silk and a sterile dressing was applied.      Throughout the procedure, the patient was monitored by a radiology  nurse for cardiac rhythm and oxygen saturation which remained stable.  The patient tolerated the procedure well and left interventional  radiology in stable condition.      Impression    IMPRESSION:  1.  Antegrade nephrostogram demonstrating mild to moderate  hydronephrosis. Contrast flows easily down the ureter to the level of  the distal obstructing stone. No contrast is seen flowing distal to  the obstructing ureteral stone.  2. Purulent fluid was aspirated from the right renal collecting system  and sent to lab for cultures.  3. Successful placement of a right 10.2 Vietnamese percutaneous  nephrostomy tube, as detailed above.      SCOTTIE BARRY DO   XR Chest Port 1 View    Narrative    CHEST PORTABLE ONE VIEW   3/9/2021 5:46 PM     HISTORY: IJ placement.    COMPARISON: Chest x-ray on same day earlier.      Impression    IMPRESSION: Single portable AP view of the chest was obtained.  Interval placement of right IJ central catheter with the tip projected  over mid SVC. Stable cardiomediastinal silhouette. Mild bibasilar  pulmonary opacities, likely atelectasis. No significant pleural  effusion or pneumothorax.    LING STOUT MD   XR Abdomen Port 1 View    Narrative    ABDOMEN ONE VIEW PORTABLE  3/10/2021 7:08 PM     HISTORY: NJ tube placement.    COMPARISON: None.      Impression    IMPRESSION: An enteric tube is likely coiled in the stomach, with tip  projected over " the fundal region. Visualized bowel gas pattern is  otherwise within normal limits. A pigtail drain is projected over the  right midabdomen. Prior cholecystectomy.    BRUNO PAYAN MD   XR Abdomen Port 1 View    Narrative    ABDOMEN ONE VIEW PORTABLE  3/11/2021 6:40 PM     HISTORY: Feeding tube positioning.    COMPARISON: 3/10/2021.      Impression    IMPRESSION: Enteric tube is again coiled in the stomach, with the tip  near the fundus. Pigtail catheter is again projected over the right  midabdomen. Paucity of gas is noted in the visualized bowel. Surgical  clips RUQ.    BRUNO PAYAN MD   CT Head w/o Contrast    Narrative    CT OF THE HEAD WITHOUT CONTRAST   3/13/2021 5:46 PM     COMPARISON: Head CT 2/8/2021    HISTORY:  Somnolence and lethargy. Low platelet count.     TECHNIQUE:  Axial CT images of the head from the skull base to the  vertex were acquired without IV contrast.    FINDINGS:   INTRACRANIAL CONTENTS: No intracranial hemorrhage, extraaxial  collection, or mass effect.  No CT evidence of acute infarct. Chronic  infarcts in the right periventricular parenchyma and in the basal  ganglia bilaterally unchanged compared to the prior exam. Moderate  presumed chronic small vessel ischemic change and generalized volume  loss.    VISUALIZED ORBITS/SINUSES/MASTOIDS: No significant orbital  abnormality.  No significant paranasal sinus mucosal disease. No  significant middle ear or mastoid effusion.    OSSEOUS STRUCTURES/SOFT TISSUES: No significant abnormality.      Impression    IMPRESSION:  1.  No acute abnormality. No intracranial hemorrhage.  2.  Chronic lacunar infarcts in the right periventricular parenchyma  in the basal ganglia bilaterally.  3.  Superimposed moderate position chronic small vessel ischemic  change and generalized volume loss.    Radiation dose for this scan was reduced using automated exposure  control, adjustment of the mA and/or kV according to patient size, or  iterative  reconstruction technique    TYRONE GUTIÉRREZ MD   XR Feeding Tube Placement    Narrative    FEEDING TUBE PLACEMENT   3/15/2021 9:23 AM    HISTORY: Nutritional needs. Existing feeding tube coiled in the  stomach.    COMPARISON: None    FINDINGS: 1.9 minutes of fluoroscopy were utilized for placement of a  feeding tube. Existing feeding tube was retracted until there was no  coiling in the stomach. A wire was placed down the tube and the tube  was advanced to the ligament of Treitz.  25 mL of contrast was  injected to confirm placement.  The tube was marked at the level of  the nose at the 109 cm length.  Estimated blood loss during the  procedure was less than 5 mL. No specimens collected. There were no  complications of the procedure.    Medications used: red cuba 109cm, 6mL Lidocaine gel, 25mL Omnipaque  240    Images Obtained: 1      Impression    IMPRESSION: Successful feeding tube placement. Ready for use.         ERNIE FITZPATRICK PA-C   XR Video Swallow with SLP or OT    Narrative    VIDEO SWALLOWING EVALUATION  March 17, 2021 9:38 AM     HISTORY: Dysphagia.    COMPARISON: None.    FLUOROSCOPY TIME: 1.6 minutes.     Number of cine runs: Eight.    FINDINGS:    Thin: Not administered.    Nectar: Premature spill to the piriforms. Deep penetration with one  episode of questionable aspiration.    Honey: Premature spillage past the epiglottis. No penetration.    Pudding: Mild residue. No penetration.    Semisolid: Not administered.    Solid: Not administered.    This study only includes the cervical esophagus.    RUBY CARBONE MD   IR Ureteral Stent Placement Right    Narrative    [facility]  LOCATION: [facility]  DATE: 3/16/2021    PROCEDURE: ANTEGRADE NEPHROSTOGRAM WITH ANTEGRADE URETERAL STENT  PLACEMENT AND REMOVAL OF THE PRE-EXISTING PERCUTANEOUS NEPHROSTOMY.    INTERVENTIONAL RADIOLOGIST: Filiberto Miner MD.    INDICATION: 80-year-old male with an obstructing right ureteral  calculus. Conversion of the  nephrostomy tube and antegrade ureteral  stent is requested.    MODERATE SEDATION: Versed 1 mg IV; Fentanyl 50 mcg IV.  Under  physician supervision, Versed and fentanyl were administered for  moderate sedation. Pulse oximetry, heart rate and blood pressure were  continuously monitored by an independent trained observer. The  physician spent 15 minutes of face-to-face sedation time with the  patient.    CONTRAST: 50 mL Isovue-300.  ANTIBIOTICS: None.  ADDITIONAL MEDICATIONS: None.    FLUOROSCOPIC TIME: 1.7 minutes.  RADIATION DOSE: Air Kerma: 12 mGy.    COMPLICATIONS: No immediate complications.    STERILE BARRIER TECHNIQUE: Maximum sterile barrier technique was used.  Cutaneous antisepsis was performed at the operative site with  application of 2% chlorhexidine and large sterile drape. Prior to the  procedure, the  and assistant performed hand hygiene and wore  hat, mask, sterile gown, and sterile gloves during the entire  procedure.    PROCEDURE:    A  image was obtained. An antegrade nephrostogram was performed  through the existing right producing his nephrostomy. This was removed  over wire and exchange was made for a 6 Gambian vascular sheath through  which a second safety wire was advanced into the upper right ureter. A  Kumpe catheter and Glidewire were then manipulated beyond the  obstructing ureteral calculus and into the urinary bladder.  Over-the-wire exchange was then made for an 8 Gambian by 24 cm double-J  ureteral stent. The distal loop was formed within the urinary bladder  and the proximal loop within the right renal pelvis. The safety wire  was removed.    FINDINGS:  Initial  image shows the right percutaneous nephrostomy. An  injection of contrast reveals that the nephrostomy is patent. Again  seen is a mid to distal right ureteral calculus resulting in  obstruction.    The completion images the newly placed ureteral stent in excellent  position.      Impression    IMPRESSION:     1.  Successful utilization of the existing right percutaneous  nephrostomy access for placement of an antegrade ureteral stent as  detailed above.  2.  Removal of the pre-existing nephrostomy.    VENANCIO GOLDSTEIN MD

## 2021-03-22 NOTE — DISCHARGE INSTRUCTIONS
"*Follow up kidney stone removal:  [facility]   [address] [place] [address]. [id number]   You may call [phone] with any questions or concerns.   Central Appointment #: [phone]     *A referral was sent to [facility] to continue home care services.  Their phone number is [phone].    *An after hospital primary care follow up visit has been scheduled for you.  The visit will be virtual on Friday April 26, 2021 at 2:00 pm. They will text a visit link to your wife's cell phone, as that what was requested by family upon review of discharge instructions.    Sacrum:   1. Clean wound with saline or MicroKlenz Spray, pat dry   2. Wipe / \"clean\" the surrounding periwound tissue with several skin preps to help with dressing sticking   3. Press a Mepilex  Sacral Dressing (PS#[id number])  to the area, making sure to conform nicely to skin curvatures. Make sure not to trap skin under foam dressing.   4. Time and date dressing change   -REPOSITION ALL PATIENTS SIDE TO SIDE ONLY WHEN IN BED, EVERY 2 HOURS,   -HEELS MUST BE KEPT ELEVATED AT ALL TIMES, USING AT LEAST 2 PILLOWS UNDER EACH CALF, ASSURING HEELS ARE FLOATING   -WHEN UP TO THE CHAIR PT NEEDS TO FULLY OFF LOAD EVERY 2 HOURS  "

## 2021-03-22 NOTE — PLAN OF CARE
Summary: Urosepsis secondary to R sided kidney stone  DATE & TIME: 3/21/2021 1900 - 0700  COGNITIVE CONCERNS/ORIENTATION: A/O, forgetful at times   BEHAVIOR & AGGRESSION TOOL COLOR: Green  ABNL VS/O2: VSS on RA  MOBILITY: Total cares, T&R every 2 hours. Up with lift. Left sided weakness from previous CVA.  PAIN MANAGMENT: Denies pain  DIET: DD1 with honey-thickened liquids. Meds  given crushed with pudding.  BOWEL/BLADDER: Chronic chirinos for retention patent, good output.   ABNL LAB/BG: None new.  DRAIN/DEVICES: R CVC and chirinos  TELEMETRY RHYTHM: NA  SKIN: scabs on lower lip. Scattered bruising/scabs, left groin bruised. Scabbed area on scrotum, Mepilex on coccyx,  back and right leg clean/intact  TESTS/PROCEDURES: none  D/C DAY/GOALS/PLACE: possible discharge today, Home with HC  OTHER IMPORTANT INFO: Contact precautions maintained for VRE. Continues on  calorie count, feeder, appetite fair.speech following

## 2021-03-23 ENCOUNTER — PATIENT OUTREACH (OUTPATIENT)
Dept: CARE COORDINATION | Facility: CLINIC | Age: 81
End: 2021-03-23

## 2021-03-23 DIAGNOSIS — R65.21 SEPTIC SHOCK (H): Primary | ICD-10-CM

## 2021-03-23 DIAGNOSIS — A41.9 SEPTIC SHOCK (H): Primary | ICD-10-CM

## 2021-03-23 NOTE — PROGRESS NOTES
Clinic Care Coordination Contact    Clinic Care Coordination Contact  OUTREACH    Referral Information:  Referral Source: IP Report         Chief Complaint   Patient presents with     Clinic Care Coordination - Post Hospital        Universal Utilization: No concerns     Utilization    Last refreshed: 3/23/2021  6:34 AM: Hospital Admissions 2           Last refreshed: 3/23/2021  6:34 AM: ED Visits 2           Last refreshed: 3/23/2021  6:34 AM: No Show Count (past year) 0              Current as of: 3/23/2021  6:34 AM              Clinical Concerns:  Current Medical Concerns:  Septic shock secondary to E. coli bacteremia.  Right-sided UVJ stone with hydronephrosis status post right-sided percutaneous nephrostomy tube placement with subsequent antegrade ureteral stent placement.  Paroxysmal atrial fibrillation.  Thrombocytopenia in the setting of severe sepsis  Acute encephalopathy most likely metabolic and infectious causes secondary to sepsis.  Dysphagia.  Hypernatremia.  Fluid overload   History of COPD   Hyperlipidemia  Dysphagia  History of depression  History of CVA with chronic left-sided weakness     Current Behavioral Concerns: None  Education Provided to patient: None     Health Maintenance Reviewed:    Clinical Pathway: None    Medication Management:  Polyethylene gylcol 17g po, Lopressor 25mg, acetaminophen 325 mg, zyloprim 300 mg, aspirin 325 mg. Lipitor 10mg, Amlactin ultra ex applied to feet, albuterol 0.5, Paxil 20mg  Functional Status:       Living Situation:  Current living arrangement:: I live in a private home with family  Type of residence:: Private home - no stairs    Lifestyle & Psychosocial Needs:                     Socioeconomic History     Marital status:      Spouse name: Not on file     Number of children: Not on file     Years of education: Not on file     Highest education level: Not on file     Tobacco Use     Smoking status: Former Smoker     Smokeless tobacco: Never Used    Substance and Sexual Activity     Alcohol use: No     Comment: none for 29 yrs     Drug use: No               Resources and Interventions:  Current Resources:   Skilled Home Care Services: Skilled Nursing(via [facility])  Community Resources: PCA(via [facility] - 2 PCA's (one is grandson) )  Supplies used at home:: Incontinence Supplies, Oxygen Tubing/Supplies, Other([employer] supplies)  Equipment Currently Used at Home: hospital bed, wheelchair, manual, wheelchair, power(Overhead triangle for positioning; Lift chair )  Employment Status: retired)   )               Goals:       Patient/Caregiver understanding: na    Outreach Frequency: weekly      Plan: Pt to be followed by Home care nursing , Pt has appt with PCP in 3 days. [facility] will outreach in one week to assess for needs.

## 2021-03-25 ENCOUNTER — ANCILLARY PROCEDURE (OUTPATIENT)
Dept: CARDIOLOGY | Facility: CLINIC | Age: 81
End: 2021-03-25
Attending: INTERNAL MEDICINE
Payer: COMMERCIAL

## 2021-03-25 DIAGNOSIS — I63.9 CEREBROVASCULAR ACCIDENT (CVA), UNSPECIFIED MECHANISM (H): ICD-10-CM

## 2021-03-25 DIAGNOSIS — Z45.09 ENCOUNTER FOR LOOP RECORDER CHECK: Primary | ICD-10-CM

## 2021-03-25 PROCEDURE — G2066 INTER DEVC REMOTE 30D: HCPCS | Performed by: INTERNAL MEDICINE

## 2021-03-25 PROCEDURE — 93298 REM INTERROG DEV EVAL SCRMS: CPT | Performed by: INTERNAL MEDICINE

## 2021-03-26 ENCOUNTER — TELEPHONE (OUTPATIENT)
Dept: CARDIOLOGY | Facility: CLINIC | Age: 81
End: 2021-03-26

## 2021-03-26 DIAGNOSIS — I63.9 CEREBROVASCULAR ACCIDENT (CVA), UNSPECIFIED MECHANISM (H): ICD-10-CM

## 2021-03-26 DIAGNOSIS — Z45.09 ENCOUNTER FOR LOOP RECORDER CHECK: Primary | ICD-10-CM

## 2021-03-26 LAB
MDC_IDC_MSMT_BATTERY_STATUS: NORMAL
MDC_IDC_PG_IMPLANT_DTM: NORMAL
MDC_IDC_PG_MFG: NORMAL
MDC_IDC_PG_MODEL: NORMAL
MDC_IDC_PG_SERIAL: NORMAL
MDC_IDC_PG_TYPE: NORMAL
MDC_IDC_SESS_CLINIC_NAME: NORMAL
MDC_IDC_SESS_DTM: NORMAL
MDC_IDC_SESS_TYPE: NORMAL
MDC_IDC_SET_ZONE_DETECTION_INTERVAL: 2000 MS
MDC_IDC_SET_ZONE_DETECTION_INTERVAL: 3000 MS
MDC_IDC_SET_ZONE_DETECTION_INTERVAL: 390 MS
MDC_IDC_SET_ZONE_TYPE: NORMAL
MDC_IDC_STAT_AT_BURDEN_PERCENT: 0.3 %
MDC_IDC_STAT_AT_DTM_END: NORMAL
MDC_IDC_STAT_AT_DTM_START: NORMAL
MDC_IDC_STAT_EPISODE_RECENT_COUNT: 0
MDC_IDC_STAT_EPISODE_RECENT_COUNT: 7
MDC_IDC_STAT_EPISODE_RECENT_COUNT_DTM_END: NORMAL
MDC_IDC_STAT_EPISODE_RECENT_COUNT_DTM_START: NORMAL
MDC_IDC_STAT_EPISODE_TOTAL_COUNT: 0
MDC_IDC_STAT_EPISODE_TOTAL_COUNT: 16
MDC_IDC_STAT_EPISODE_TOTAL_COUNT_DTM_END: NORMAL
MDC_IDC_STAT_EPISODE_TOTAL_COUNT_DTM_START: NORMAL
MDC_IDC_STAT_EPISODE_TYPE: NORMAL

## 2021-03-26 NOTE — TELEPHONE ENCOUNTER
This is a tough call in a sense that AF is commonly in this setting. I think it would be best for him to sit down with me in office to go over it. See me when he is recovered from recent illness. qp

## 2021-03-26 NOTE — TELEPHONE ENCOUNTER
Called patient and left voicemail requesting call back to discuss findings and Dr. Parisi's recommendations.

## 2021-03-26 NOTE — TELEPHONE ENCOUNTER
"Patient had a routine loop recorder check today which showed 7 AF episodes logged. 5 of the EGMs show SR with frequent PVCs. 2 EGM's show AF with median V rates in 110-130's lasted 4 min to 6h8m. These occurred during hospitalization for septic shock. He is not on anticoagulation. 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   Per patient's hospital discharge note,   \"Paroxysmal atrial fibrillation.  -This was in the ICU in the setting of septic shock.  It was transient and he converted to normal sinus rhythm and maintained that for the course of his hospitalization.   -Patient already has a loop recorder in place and has a device check coming up.  He can be evaluated at that time for potential burden of A. Fib(if found) and accordingly intervened.\"  Will route to to Dr. Parisi.         "

## 2021-03-31 DIAGNOSIS — Z11.59 ENCOUNTER FOR SCREENING FOR OTHER VIRAL DISEASES: ICD-10-CM

## 2021-04-01 ENCOUNTER — PATIENT OUTREACH (OUTPATIENT)
Dept: CARE COORDINATION | Facility: CLINIC | Age: 81
End: 2021-04-01

## 2021-04-01 NOTE — PROGRESS NOTES
Clinic Care Coordination Contact    Follow Up Progress Note      Assessment: PC to pt's wife as pt was busy at the moment. SWCC introduced self and discussed possible needs of pt. Wife stated that he has a county  and that she helps them with everything they need and declined CC support.    Goals addressed this encounter:   Goals Addressed    None          Intervention/Education provided during outreach: na          Plan:   SWCC will close out ml.  Care Coordinator will not follow up

## 2021-04-08 ENCOUNTER — NURSE TRIAGE (OUTPATIENT)
Dept: NURSING | Facility: CLINIC | Age: 81
End: 2021-04-08

## 2021-04-08 NOTE — TELEPHONE ENCOUNTER
Clinic Action Needed: Yes, follow up requested from Urology care team. Please call renay Farrell at [phone]  FNA Triage Call  Presenting Problem:    Caller: renay Farrell  Concern:  Pre-procedure COVID testing    Patient has surgery on 4/15 with Dr. Sullivan.  Needs pre-procedure COVID test but states that patient is bedridden and will be difficult to transport him.  Tried [facility] in home test but result will not make it in time for the procedure due to shipping concerns.    She states that to bring him for COVID test would require a Terrie lift and have patient transferred to a power chair, then special transport required to be scheduled.  Unfortunately, FNA advised that this may have to be done so he can do pre-procedure test.    Patient has a pre-op on 4/12 with Dr. Moreno/[facility] but they do not do a COVID testing at that site. FNA also advised that if COVID test is done on 4/12, may not make it in time for 4/14 surgery.    Asks for further advise from surgery team.    Mayelin Redmond RN/Rhoith Nurse Advisor      Routed to: Rob Sullivan MD [[id number]]  [facility] [[id number]]    Mayelin Redmond RN/Rohith Nurse Advisor                  Additional Information    Nursing judgment    Protocols used: INFORMATION ONLY CALL - NO TRIAGE-A-OH

## 2021-04-12 ENCOUNTER — TRANSFERRED RECORDS (OUTPATIENT)
Dept: HEALTH INFORMATION MANAGEMENT | Facility: CLINIC | Age: 81
End: 2021-04-12

## 2021-04-12 LAB
SARS-COV-2 RNA RESP QL NAA+PROBE: NORMAL
SPECIMEN SOURCE: NORMAL

## 2021-04-12 PROCEDURE — U0005 INFEC AGEN DETEC AMPLI PROBE: HCPCS | Performed by: UROLOGY

## 2021-04-12 PROCEDURE — U0003 INFECTIOUS AGENT DETECTION BY NUCLEIC ACID (DNA OR RNA); SEVERE ACUTE RESPIRATORY SYNDROME CORONAVIRUS 2 (SARS-COV-2) (CORONAVIRUS DISEASE [COVID-19]), AMPLIFIED PROBE TECHNIQUE, MAKING USE OF HIGH THROUGHPUT TECHNOLOGIES AS DESCRIBED BY CMS-2020-01-R: HCPCS | Performed by: UROLOGY

## 2021-04-13 ENCOUNTER — TRANSFERRED RECORDS (OUTPATIENT)
Dept: HEALTH INFORMATION MANAGEMENT | Facility: CLINIC | Age: 81
End: 2021-04-13

## 2021-04-13 LAB
CREAT SERPL-MCNC: 0.6 MG/DL (ref 0.76–1.27)
GFR SERPL CREATININE-BSD FRML MDRD: 96 ML/MIN/1.73M2
GLUCOSE SERPL-MCNC: 117 MG/DL (ref 65–99)
LABORATORY COMMENT REPORT: NORMAL
POTASSIUM SERPL-SCNC: 4.5 MMOL/L (ref 3.5–5.2)
SARS-COV-2 RNA RESP QL NAA+PROBE: NEGATIVE
SPECIMEN SOURCE: NORMAL

## 2021-04-13 RX ORDER — TAMSULOSIN HYDROCHLORIDE 0.4 MG/1
0.8 CAPSULE ORAL DAILY
Status: ON HOLD | COMMUNITY
End: 2021-04-14

## 2021-04-13 NOTE — PROGRESS NOTES
PTA medications updated by Medication Scribe prior to surgery via phone call with patient        Comments:    Medication history sources: Patient's family/friend (SPOUSE), Surescripts and H&P  In the past week, patient estimated taking medication this percent of the time: Greater than 90%  Adherence assessment: N/A Not Observed    Significant changes made to the medication list:  Patient reports no longer taking the following meds (med scribe removed from PTA med list): LIPITOR & IPRATROPIUM/ALBUTEROL NEB SOL      Additional medication history information:   None    The information provided in this note is only as accurate as the sources available at the time of update(s)      Prior to Admission medications    Medication Sig Last Dose Taking? Auth Provider   acetaminophen (TYLENOL) 325 MG tablet Take 650 mg by mouth every 4 hours as needed for mild pain as needed for pain/fever Max dose 3000mg/24hr  at PRN Yes Reported, Patient   allopurinol (ZYLOPRIM) 300 MG tablet Take 300 mg by mouth daily  at AM Yes Unknown, Entered By History   Emollient (AMLACTIN ULTRA EX) Apply topically daily as needed TO FEET  at PM Yes Reported, Patient   metoprolol tartrate (LOPRESSOR) 25 MG tablet Take 0.5 tablets (12.5 mg) by mouth 2 times daily  at PM Yes Romulo Cavanaugh MD   PARoxetine (PAXIL) 20 MG tablet Take 20 mg by mouth daily  at AM Yes Unknown, Entered By History   polyethylene glycol (MIRALAX) 17 GM/Dose powder Take 17 g by mouth daily  at AM Yes Romulo Cavanaugh MD   tamsulosin (FLOMAX) 0.4 MG capsule Take 0.8 mg by mouth daily (0.4MG X 2 = 0.8MG)  at AM Yes Reported, Patient   aspirin (ASA) 325 MG EC tablet Take 1 tablet (325 mg) by mouth daily  at AM  Sandro Maradiaga MD

## 2021-04-14 ENCOUNTER — ANESTHESIA (OUTPATIENT)
Dept: SURGERY | Facility: CLINIC | Age: 81
End: 2021-04-14
Payer: COMMERCIAL

## 2021-04-14 ENCOUNTER — APPOINTMENT (OUTPATIENT)
Dept: GENERAL RADIOLOGY | Facility: CLINIC | Age: 81
End: 2021-04-14
Attending: UROLOGY
Payer: COMMERCIAL

## 2021-04-14 ENCOUNTER — HOSPITAL ENCOUNTER (OUTPATIENT)
Facility: CLINIC | Age: 81
Setting detail: OBSERVATION
Discharge: HOME | End: 2021-04-15
Attending: UROLOGY | Admitting: UROLOGY
Payer: COMMERCIAL

## 2021-04-14 ENCOUNTER — ANESTHESIA EVENT (OUTPATIENT)
Dept: SURGERY | Facility: CLINIC | Age: 81
End: 2021-04-14
Payer: COMMERCIAL

## 2021-04-14 DIAGNOSIS — N13.1 HYDRONEPHROSIS DUE TO OBSTRUCTION OF URETER: Primary | ICD-10-CM

## 2021-04-14 LAB — GLUCOSE BLDC GLUCOMTR-MCNC: 118 MG/DL (ref 70–99)

## 2021-04-14 PROCEDURE — 250N000011 HC RX IP 250 OP 636: Performed by: PHYSICIAN ASSISTANT

## 2021-04-14 PROCEDURE — 255N000002 HC RX 255 OP 636: Performed by: UROLOGY

## 2021-04-14 PROCEDURE — 250N000011 HC RX IP 250 OP 636

## 2021-04-14 PROCEDURE — 999N001017 HC STATISTIC GLUCOSE BY METER IP

## 2021-04-14 PROCEDURE — G0378 HOSPITAL OBSERVATION PER HR: HCPCS

## 2021-04-14 PROCEDURE — 258N000001 HC RX 258: Performed by: UROLOGY

## 2021-04-14 PROCEDURE — 250N000009 HC RX 250

## 2021-04-14 PROCEDURE — 360N000076 HC SURGERY LEVEL 3, PER MIN: Performed by: UROLOGY

## 2021-04-14 PROCEDURE — 93005 ELECTROCARDIOGRAM TRACING: CPT | Mod: 59

## 2021-04-14 PROCEDURE — 250N000025 HC SEVOFLURANE, PER MIN: Performed by: UROLOGY

## 2021-04-14 PROCEDURE — 82365 CALCULUS SPECTROSCOPY: CPT | Performed by: UROLOGY

## 2021-04-14 PROCEDURE — 88300 SURGICAL PATH GROSS: CPT | Mod: TC | Performed by: UROLOGY

## 2021-04-14 PROCEDURE — 88300 SURGICAL PATH GROSS: CPT | Mod: 26 | Performed by: PATHOLOGY

## 2021-04-14 PROCEDURE — 999N000141 HC STATISTIC PRE-PROCEDURE NURSING ASSESSMENT: Performed by: UROLOGY

## 2021-04-14 PROCEDURE — 250N000013 HC RX MED GY IP 250 OP 250 PS 637: Performed by: UROLOGY

## 2021-04-14 PROCEDURE — C1769 GUIDE WIRE: HCPCS | Performed by: UROLOGY

## 2021-04-14 PROCEDURE — 258N000003 HC RX IP 258 OP 636: Performed by: UROLOGY

## 2021-04-14 PROCEDURE — 710N000009 HC RECOVERY PHASE 1, LEVEL 1, PER MIN: Performed by: UROLOGY

## 2021-04-14 PROCEDURE — 272N000001 HC OR GENERAL SUPPLY STERILE: Performed by: UROLOGY

## 2021-04-14 PROCEDURE — 258N000003 HC RX IP 258 OP 636

## 2021-04-14 PROCEDURE — 370N000017 HC ANESTHESIA TECHNICAL FEE, PER MIN: Performed by: UROLOGY

## 2021-04-14 PROCEDURE — 999N000179 XR SURGERY CARM FLUORO LESS THAN 5 MIN W STILLS

## 2021-04-14 PROCEDURE — 250N000011 HC RX IP 250 OP 636: Performed by: UROLOGY

## 2021-04-14 RX ORDER — LIDOCAINE HYDROCHLORIDE 20 MG/ML
INJECTION, SOLUTION INFILTRATION; PERINEURAL PRN
Status: DISCONTINUED | OUTPATIENT
Start: 2021-04-14 | End: 2021-04-14

## 2021-04-14 RX ORDER — NALOXONE HYDROCHLORIDE 0.4 MG/ML
0.4 INJECTION, SOLUTION INTRAMUSCULAR; INTRAVENOUS; SUBCUTANEOUS
Status: ACTIVE | OUTPATIENT
Start: 2021-04-14 | End: 2021-04-15

## 2021-04-14 RX ORDER — HYDROMORPHONE HYDROCHLORIDE 1 MG/ML
.3-.5 INJECTION, SOLUTION INTRAMUSCULAR; INTRAVENOUS; SUBCUTANEOUS EVERY 10 MIN PRN
Status: DISCONTINUED | OUTPATIENT
Start: 2021-04-14 | End: 2021-04-14

## 2021-04-14 RX ORDER — PROPOFOL 10 MG/ML
INJECTION, EMULSION INTRAVENOUS CONTINUOUS PRN
Status: DISCONTINUED | OUTPATIENT
Start: 2021-04-14 | End: 2021-04-14

## 2021-04-14 RX ORDER — FENTANYL CITRATE 50 UG/ML
25-50 INJECTION, SOLUTION INTRAMUSCULAR; INTRAVENOUS
Status: DISCONTINUED | OUTPATIENT
Start: 2021-04-14 | End: 2021-04-14

## 2021-04-14 RX ORDER — NALOXONE HYDROCHLORIDE 0.4 MG/ML
0.2 INJECTION, SOLUTION INTRAMUSCULAR; INTRAVENOUS; SUBCUTANEOUS
Status: ACTIVE | OUTPATIENT
Start: 2021-04-14 | End: 2021-04-15

## 2021-04-14 RX ORDER — LIDOCAINE 40 MG/G
CREAM TOPICAL
Status: DISCONTINUED | OUTPATIENT
Start: 2021-04-14 | End: 2021-04-15 | Stop reason: HOSPADM

## 2021-04-14 RX ORDER — MEPERIDINE HYDROCHLORIDE 25 MG/ML
12.5 INJECTION INTRAMUSCULAR; INTRAVENOUS; SUBCUTANEOUS
Status: DISCONTINUED | OUTPATIENT
Start: 2021-04-14 | End: 2021-04-14

## 2021-04-14 RX ORDER — ACETAMINOPHEN 325 MG/1
650 TABLET ORAL EVERY 4 HOURS PRN
Status: DISCONTINUED | OUTPATIENT
Start: 2021-04-14 | End: 2021-04-15 | Stop reason: HOSPADM

## 2021-04-14 RX ORDER — IOPAMIDOL 612 MG/ML
INJECTION, SOLUTION INTRAVASCULAR PRN
Status: DISCONTINUED | OUTPATIENT
Start: 2021-04-14 | End: 2021-04-14 | Stop reason: HOSPADM

## 2021-04-14 RX ORDER — CEFAZOLIN SODIUM 1 G/3ML
1 INJECTION, POWDER, FOR SOLUTION INTRAMUSCULAR; INTRAVENOUS EVERY 12 HOURS
Status: COMPLETED | OUTPATIENT
Start: 2021-04-14 | End: 2021-04-15

## 2021-04-14 RX ORDER — ONDANSETRON 4 MG/1
4 TABLET, ORALLY DISINTEGRATING ORAL EVERY 30 MIN PRN
Status: DISCONTINUED | OUTPATIENT
Start: 2021-04-14 | End: 2021-04-14

## 2021-04-14 RX ORDER — CEFAZOLIN SODIUM 2 G/100ML
2 INJECTION, SOLUTION INTRAVENOUS SEE ADMIN INSTRUCTIONS
Status: DISCONTINUED | OUTPATIENT
Start: 2021-04-14 | End: 2021-04-14 | Stop reason: HOSPADM

## 2021-04-14 RX ORDER — SODIUM CHLORIDE, SODIUM LACTATE, POTASSIUM CHLORIDE, CALCIUM CHLORIDE 600; 310; 30; 20 MG/100ML; MG/100ML; MG/100ML; MG/100ML
INJECTION, SOLUTION INTRAVENOUS CONTINUOUS PRN
Status: DISCONTINUED | OUTPATIENT
Start: 2021-04-14 | End: 2021-04-14

## 2021-04-14 RX ORDER — ONDANSETRON 2 MG/ML
4 INJECTION INTRAMUSCULAR; INTRAVENOUS EVERY 30 MIN PRN
Status: DISCONTINUED | OUTPATIENT
Start: 2021-04-14 | End: 2021-04-14

## 2021-04-14 RX ORDER — SODIUM CHLORIDE, SODIUM LACTATE, POTASSIUM CHLORIDE, CALCIUM CHLORIDE 600; 310; 30; 20 MG/100ML; MG/100ML; MG/100ML; MG/100ML
INJECTION, SOLUTION INTRAVENOUS CONTINUOUS
Status: DISCONTINUED | OUTPATIENT
Start: 2021-04-14 | End: 2021-04-15 | Stop reason: HOSPADM

## 2021-04-14 RX ORDER — ALLOPURINOL 300 MG/1
300 TABLET ORAL DAILY
Status: DISCONTINUED | OUTPATIENT
Start: 2021-04-14 | End: 2021-04-15 | Stop reason: HOSPADM

## 2021-04-14 RX ORDER — PAROXETINE 20 MG/1
20 TABLET, FILM COATED ORAL DAILY
Status: DISCONTINUED | OUTPATIENT
Start: 2021-04-14 | End: 2021-04-15 | Stop reason: HOSPADM

## 2021-04-14 RX ORDER — ACETAMINOPHEN 325 MG/1
975 TABLET ORAL ONCE
Status: DISCONTINUED | OUTPATIENT
Start: 2021-04-14 | End: 2021-04-14 | Stop reason: HOSPADM

## 2021-04-14 RX ORDER — FENTANYL CITRATE 50 UG/ML
INJECTION, SOLUTION INTRAMUSCULAR; INTRAVENOUS PRN
Status: DISCONTINUED | OUTPATIENT
Start: 2021-04-14 | End: 2021-04-14

## 2021-04-14 RX ORDER — CEFAZOLIN SODIUM 2 G/100ML
2 INJECTION, SOLUTION INTRAVENOUS
Status: COMPLETED | OUTPATIENT
Start: 2021-04-14 | End: 2021-04-14

## 2021-04-14 RX ORDER — PROPOFOL 10 MG/ML
INJECTION, EMULSION INTRAVENOUS PRN
Status: DISCONTINUED | OUTPATIENT
Start: 2021-04-14 | End: 2021-04-14

## 2021-04-14 RX ORDER — ONDANSETRON 2 MG/ML
INJECTION INTRAMUSCULAR; INTRAVENOUS PRN
Status: DISCONTINUED | OUTPATIENT
Start: 2021-04-14 | End: 2021-04-14

## 2021-04-14 RX ADMIN — CEFAZOLIN 1 G: 1 INJECTION, POWDER, FOR SOLUTION INTRAMUSCULAR; INTRAVENOUS at 21:13

## 2021-04-14 RX ADMIN — PHENYLEPHRINE HYDROCHLORIDE 200 MCG: 10 INJECTION INTRAVENOUS at 10:59

## 2021-04-14 RX ADMIN — LIDOCAINE HYDROCHLORIDE 100 MG: 20 INJECTION, SOLUTION INFILTRATION; PERINEURAL at 10:57

## 2021-04-14 RX ADMIN — PHENYLEPHRINE HYDROCHLORIDE 100 MCG: 10 INJECTION INTRAVENOUS at 11:08

## 2021-04-14 RX ADMIN — SODIUM CHLORIDE, POTASSIUM CHLORIDE, SODIUM LACTATE AND CALCIUM CHLORIDE: 600; 310; 30; 20 INJECTION, SOLUTION INTRAVENOUS at 10:52

## 2021-04-14 RX ADMIN — ONDANSETRON 4 MG: 2 INJECTION INTRAMUSCULAR; INTRAVENOUS at 11:45

## 2021-04-14 RX ADMIN — PROPOFOL 25 MCG/KG/MIN: 10 INJECTION, EMULSION INTRAVENOUS at 11:20

## 2021-04-14 RX ADMIN — CEFAZOLIN SODIUM 2 G: 2 INJECTION, SOLUTION INTRAVENOUS at 11:07

## 2021-04-14 RX ADMIN — METOPROLOL TARTRATE 12.5 MG: 25 TABLET, FILM COATED ORAL at 22:00

## 2021-04-14 RX ADMIN — ACETAMINOPHEN 650 MG: 325 TABLET, FILM COATED ORAL at 22:07

## 2021-04-14 RX ADMIN — PROPOFOL 200 MG: 10 INJECTION, EMULSION INTRAVENOUS at 10:57

## 2021-04-14 RX ADMIN — ALLOPURINOL 300 MG: 300 TABLET ORAL at 15:27

## 2021-04-14 RX ADMIN — PHENYLEPHRINE HYDROCHLORIDE 150 MCG: 10 INJECTION INTRAVENOUS at 11:29

## 2021-04-14 RX ADMIN — ROCURONIUM BROMIDE 50 MG: 10 INJECTION INTRAVENOUS at 10:57

## 2021-04-14 RX ADMIN — PHENYLEPHRINE HYDROCHLORIDE 150 MCG: 10 INJECTION INTRAVENOUS at 11:26

## 2021-04-14 RX ADMIN — SODIUM CHLORIDE, POTASSIUM CHLORIDE, SODIUM LACTATE AND CALCIUM CHLORIDE: 600; 310; 30; 20 INJECTION, SOLUTION INTRAVENOUS at 13:45

## 2021-04-14 RX ADMIN — ASPIRIN 325 MG: 325 TABLET, COATED ORAL at 15:27

## 2021-04-14 RX ADMIN — PHENYLEPHRINE HYDROCHLORIDE 100 MCG: 10 INJECTION INTRAVENOUS at 11:05

## 2021-04-14 RX ADMIN — SODIUM CHLORIDE, POTASSIUM CHLORIDE, SODIUM LACTATE AND CALCIUM CHLORIDE: 600; 310; 30; 20 INJECTION, SOLUTION INTRAVENOUS at 21:12

## 2021-04-14 RX ADMIN — SUGAMMADEX 200 MG: 100 INJECTION, SOLUTION INTRAVENOUS at 11:46

## 2021-04-14 RX ADMIN — PAROXETINE HYDROCHLORIDE 20 MG: 20 TABLET, FILM COATED ORAL at 15:28

## 2021-04-14 RX ADMIN — PHENYLEPHRINE HYDROCHLORIDE 100 MCG: 10 INJECTION INTRAVENOUS at 11:02

## 2021-04-14 RX ADMIN — PHENYLEPHRINE HYDROCHLORIDE 100 MCG: 10 INJECTION INTRAVENOUS at 11:01

## 2021-04-14 RX ADMIN — FENTANYL CITRATE 100 MCG: 50 INJECTION, SOLUTION INTRAMUSCULAR; INTRAVENOUS at 10:57

## 2021-04-14 ASSESSMENT — COPD QUESTIONNAIRES
COPD: 1
CAT_SEVERITY: MODERATE

## 2021-04-14 ASSESSMENT — MIFFLIN-ST. JEOR: SCORE: 1848.9

## 2021-04-14 ASSESSMENT — LIFESTYLE VARIABLES: TOBACCO_USE: 0

## 2021-04-14 NOTE — PROGRESS NOTES
Med scribe made updates on 4/14:    Pt's spouse reports no longer taking Tamsulosin       Comments:    Medication history sources: Patient's family/friend (SPOUSE), Surescripts and H&P  In the past week, patient estimated taking medication this percent of the time: Greater than 90%  Adherence assessment: N/A Not Observed    Significant changes made to the medication list:  Patient reports no longer taking the following meds (med scribe removed from PTA med list): LIPITOR & IPRATROPIUM/ALBUTEROL NEB SOL      Additional medication history information:   None    The information provided in this note is only as accurate as the sources available at the time of update(s)      Prior to Admission medications    Medication Sig Last Dose Taking? Auth Provider   acetaminophen (TYLENOL) 325 MG tablet Take 650 mg by mouth every 4 hours as needed for mild pain as needed for pain/fever Max dose 3000mg/24hr  at PRN Yes Reported, Patient   allopurinol (ZYLOPRIM) 300 MG tablet Take 300 mg by mouth daily  at AM Yes Unknown, Entered By History   Emollient (AMLACTIN ULTRA EX) Apply topically daily as needed TO FEET  at PM Yes Reported, Patient   metoprolol tartrate (LOPRESSOR) 25 MG tablet Take 0.5 tablets (12.5 mg) by mouth 2 times daily  at PM Yes Romulo Cavanaugh MD   PARoxetine (PAXIL) 20 MG tablet Take 20 mg by mouth daily  at AM Yes Unknown, Entered By History   polyethylene glycol (MIRALAX) 17 GM/Dose powder Take 17 g by mouth daily  at AM Yes Romulo Cavanaugh MD   tamsulosin (FLOMAX) 0.4 MG capsule Take 0.8 mg by mouth daily (0.4MG X 2 = 0.8MG)  at AM Yes Reported, Patient   aspirin (ASA) 325 MG EC tablet Take 1 tablet (325 mg) by mouth daily  at AM  Sandro Maradiaga MD

## 2021-04-14 NOTE — OR NURSING
"Patient states he \"failed (his) last swallow test\" and had been on a pureed diet at home. BS  checked at bedside, 117@1030am. EKG ordered-NSR RBBB.  Life alert bracelet placed in denture cup in belongings, belongings and power w/c in SDS recovery.   "

## 2021-04-14 NOTE — PROVIDER NOTIFICATION
MD Notification    Notified Person: MD    Notified Person Name: Kari via OR RN    Notification Date/Time: 4/14/2021 7557    Notification Interaction: Text paged/ discussed with RN over telephone    Purpose of Notification: Regular diet ordered but wondering if we can switch to DD1 pureed with honey thick liquids. Also, patient has order for aspirin. Confirming it's okay to give aspirin post procedure.    Orders Received:OK for writer to give aspirin and change diet     Comments:

## 2021-04-14 NOTE — ANESTHESIA PROCEDURE NOTES
Airway       Patient location during procedure: OR       Procedure Start/Stop Times: 4/14/2021 11:08 AM  Staff -        Anesthesiologist:  Edward Martin MD       CRNA: Gardenia Olivares APRN CRNA       Other Anesthesia Staff: Redd Jaimes       Performed By: DENISE  Consent for Airway        Urgency: elective  Indications and Patient Condition       Indications for airway management: vincent-procedural       Induction type:RSI       Mask difficulty assessment: 0 - not attempted    Final Airway Details       Final airway type: endotracheal airway       Successful airway: ETT - single  Endotracheal Airway Details        ETT size (mm): 8.0       Cuffed: yes       Cuff volume (mL): 8       Successful intubation technique: video laryngoscopy       VL Blade Size: Glidescope 4       Grade View of Cords: 1       Adjucts: stylet       Position: Right       Measured from: lips       Secured at (cm): 24       Bite block used: None    Post intubation assessment        Placement verified by: capnometry, equal breath sounds and chest rise        Number of attempts at approach: 1       Number of other approaches attempted: 0       Secured with: pink tape       Ease of procedure: easy       Dentition: Unchanged (edentulous)    Medication(s) Administered   Medication Administration Time: 4/14/2021 10:58 AM

## 2021-04-14 NOTE — DISCHARGE INSTRUCTIONS
Same Day Surgery Discharge Instructions for  Sedation and General Anesthesia       It's not unusual to feel dizzy, light-headed or faint for up to 24 hours after surgery or while taking pain medication.  If you have these symptoms: sit for a few minutes before standing and have someone assist you when you get up to walk or use the bathroom.      You should rest and relax for the next 24 hours. We recommend you make arrangements to have an adult stay with you for at least 24 hours after your discharge.  Avoid hazardous and strenuous activity.      DO NOT DRIVE any vehicle or operate mechanical equipment for 24 hours following the end of your surgery.  Even though you may feel normal, your reactions may be affected by the medication you have received.      Do not drink alcoholic beverages for 24 hours following surgery.       Slowly progress to your regular diet as you feel able. It's not unusual to feel nauseated and/or vomit after receiving anesthesia.  If you develop these symptoms, drink clear liquids (apple juice, ginger ale, broth, 7-up, etc. ) until you feel better.  If your nausea and vomiting persists for 24 hours, please notify your surgeon.        All narcotic pain medications, along with inactivity and anesthesia, can cause constipation. Drinking plenty of liquids and increasing fiber intake will help.      For any questions of a medical nature, call your surgeon.      Do not make important decisions for 24 hours.      If you had general anesthesia, you may have a sore throat for a couple of days related to the breathing tube used during surgery.  You may use Cepacol lozenges to help with this discomfort.  If it worsens or if you develop a fever, contact your surgeon.       If you feel your pain is not well managed with the pain medications prescribed by your surgeon, please contact your surgeon's office to let them know so they can address your concerns.       CoVid 19 Information    We want to give you  information regarding Covid. Please consult your primary care provider with any questions you might have.     Patient who have symptoms (cough, fever, or shortness of breath), need to isolate for 7 days from when symptoms started OR 72 hours after fever resolves (without fever reducing medications) AND improvement of respiratory symptoms (whichever is longer).      Isolate yourself at home (in own room/own bathroom if possible)    Do Not allow any visitors    Do Not go to work or school    Do Not go to Rastafarian,  centers, shopping, or other public places.    Do Not shake hands.    Avoid close and intimate contact with others (hugging, kissing).    Follow CDC recommendations for household cleaning of frequently touched services.     After the initial 7 days, continue to isolate yourself from household members as much as possible. To continue decrease the risk of community spread and exposure, you and any members of your household should limit activities in public for 14 days after starting home isolation.     You can reference the following CDC link for helpful home isolation/care tips:  https://www.cdc.gov/coronavirus/2019-ncov/downloads/10Things.pdf    Protect Others:    Cover Your Mouth and Nose with a mask, disposable tissue or wash cloth to avoid spreading germs to others.    Wash your hands and face frequently with soap and water    Call Your Primary Doctor If: Breathing difficulty develops or you become worse.    For more information about COVID19 and options for caring for yourself at home, please visit the CDC website at https://www.cdc.gov/coronavirus/2019-ncov/about/steps-when-sick.html  For more options for care at [facility], please visit our website at [email or web address]      Cystoscopy and Stent Placement Discharge Instructions    During surgery, a stent was placed in the ureter.  The ureter is the tube that drains urine from the kidney to the bladder.  The  stent is placed to dilate (open) the ureter so the stone fragments can pass easily through the ureter or to decrease ureteral swelling after surgery, or to relieve an obstruction. The stent is made of rubber. The upper end of the stent curls in the kidney while the lower end rests in the bladder      Diet:    Return to the diet that you were on before the procedure, unless you are given specific diet instructions.    It is important to drink 6-8 glasses of fluids per day at home - at least 3-4 glasses should be water.    Activity:    Walk short distances and increase as your strength allows.    You may climb stairs.    Do not do strenuous exercise or heavy lifting until approved by surgeon.    Do not drive while taking narcotic pain medications.    Bathing:    You may take a shower.    While the stent is in place you may experience the following symptoms:    Blood and/or small blood clots in urine.    Bladder spasm (frequency and urgency of urination).    Discomfort or aching in the back or side where the stent is.    Burning or discomfort at the end of urine stream.    To decrease these symptoms you should:    Take pain medication as prescribed.    Drink plenty of fluids.    If you experience pain at the end of urination try not emptying your bladder completely.    If having discomfort in back or side, decrease activity.    Call your physician if these signs/symptoms are present:    Pain that is not relieved by a short rest or ordered pain medications.    Temperature at or above 101.0 F or chills.    Inability or difficulty urinating.     Excessive blood in urine.    Any questions or concerns.      DISCHARGE INSTRUCTIONS FOR   CATHETER CARE AT HOME      .      Basic Catheter Care  1. Always wash hands before and after handling your catheter.  2. Use soap and water to wash the area around your catheter.  3. Do this procedure twice a day.  4. Proper cleansing will help keep the area from becoming irritated or  infected.    Leg Bag  This is a small plastic bag that collects urine draining from your catheter and then strapped around your thigh. It will need to be emptied when the bag is 1/2 to 3/4 full.     Large Drainage Bag  1. This bag is larger than the leg bag and holds more urine.  It is to be used while at home, especially at night.    2. Before you go to bed, change the leg bag to the large drainage bag.  3. Pinch off the catheter with your fingers and swab the connection between the catheter and leg bag with alcohol sponge.  4. Disconnect the leg bag and connect the large drainage bag to your catheter.  5. When you get into bed, arrange the drainage tubing so that it doesn t kink.  6. Be sure to keep the bag below the level of your bladder and allow enough slack for turning.    Cleaning Your Drainage Bags    1. Wash hands.  2. Using funnel or syringe, fill the bag half full with a solution of 1/2  vinegar and 1/2 water.  3. Shake bag, allowing mixture to cleanse inside of bag.  4. Empty out all vinegar and water mixture from your bag.  5. Hang bag to dry when not in use.  6. Clean your bags anytime you change them.      Helpful Hints  1. Always keep drainage bags below bladder level to insure adequate drainage.  2. Drink 4-6 glasses of water daily along with other fluids you normally drink to keep urine free of infection and / or clots.  3. If you notice no urine in your bag for 2 to 4 hours or you develop extreme discomfort in bladder area, your catheter maybe plugged.  Notify your doctor.  4. If you notice your urine becomes foul smelling and cloudy, notify your doctor.  Also notify your doctor if you develop fever or chills.  5. If you notice urine leaking around the outside of the catheter, check to be sure catheter or tubing is not kinked.  6. Don t use leg bag while in bed.              **If you have questions or concerns about your procedure,   call Dr. Sullivan at [phone]**

## 2021-04-14 NOTE — ANESTHESIA CARE TRANSFER NOTE
Patient: Ron Gilmore    Procedure(s):  CYSTOSCOPY, BLADDER STONE REMOVAL, RIGHT URETEROSCOPY, HOLMIUM LASER LITHOTRIPSY, AND RIGHT STENT REMOVAL, RIGHT RETROGRADE    Diagnosis: Right ureteral stone [N20.1]  Diagnosis Additional Information: No value filed.    Anesthesia Type:   General     Note:    Oropharynx: oropharynx clear of all foreign objects  Level of Consciousness: awake  Oxygen Supplementation: face mask    Independent Airway: airway patency satisfactory and stable  Dentition: dentition unchanged  Vital Signs Stable: post-procedure vital signs reviewed and stable  Report to RN Given: handoff report given  Patient transferred to: PACU    Handoff Report: Identifed the Patient, Identified the Reponsible Provider, Reviewed the pertinent medical history, Discussed the surgical course, Reviewed Intra-OP anesthesia mangement and issues during anesthesia, Set expectations for post-procedure period and Allowed opportunity for questions and acknowledgement of understanding      Vitals: (Last set prior to Anesthesia Care Transfer)  CRNA VITALS  4/14/2021 1126 - 4/14/2021 1159      4/14/2021             SpO2:  99 %    Resp Rate (set):  10        Electronically Signed By: ELIZABETH Poon CRNA  April 14, 2021  11:59 AM

## 2021-04-14 NOTE — ANESTHESIA PREPROCEDURE EVALUATION
Anesthesia Pre-Procedure Evaluation    Patient: Ron Gilmore   MRN: [id number] : 1940        Preoperative Diagnosis: Right ureteral stone [N20.1]   Procedure : Procedure(s):  CYSTOSCOPY, RIGHT URETEROSCOPY, HOLMIUM LASER LITHOTRIPSY, AND RIGHT STENT REMOVAL OR EXCHANGE     Past Medical History:   Diagnosis Date     BPH (benign prostatic hyperplasia)      Cataract      Cholelithiasis      COPD (chronic obstructive pulmonary disease) (H)      Depression      Diabetes mellitus     Type 2     Dyshidrotic foot dermatitis      Edema      Gout      Hyperlipidemia      Hypertension      Kidney stones     Bladder Stones     Lumbago      Lumbar disc displacement without myelopathy      Muscle weakness      Neuropathy, diabetic (H)      Obesity      Spinal stenosis      Stroke (H)     with residual effects- weakness LUE> LLE     Unsteady gait      Urinary retention with incomplete bladder emptying      UTI (urinary tract infection)       Past Surgical History:   Procedure Laterality Date     APPENDECTOMY OPEN       ARTHROSCOPY SHOULDER ROTATOR CUFF REPAIR       cataracts Bilateral      CHOLECYSTECTOMY       COLONOSCOPY       CYSTOSCOPY  10/19/2011    Procedure:CYSTOSCOPY; CYSTOSCOPY, BLADDER STONE REMOVAL; Surgeon:ROB SAWYER; Location: OR     CYSTOSCOPY, TRANSURETHRAL RESECTION (TUR) PROSTATE, COMBINED N/A 2018    Procedure: COMBINED CYSTOSCOPY, TRANSURETHRAL RESECTION (TUR) PROSTATE;  COMBINED CYSTOSCOPY, TRANSURETHRAL RESECTION (TUR) PROSTATE ;  Surgeon: Rob Sawyer MD;  Location:  OR     EP LOOP RECORDER IMPLANT N/A 2020    Procedure: EP Loop Recorder Implant;  Surgeon: Evgeny Parisi MD;  Location:  [facility]     EYE SURGERY      right lid surgery      IR NEPHROSTOMY TUBE PLACEMENT RIGHT  3/9/2021     IR URETERAL STENT PLACEMENT RIGHT  3/16/2021     JOINT REPLACEMENT Right     HIP     KNEE SURGERY Bilateral      LAMINECTOMY LUMBAR ONE LEVEL       TONSILLECTOMY        No  Known Allergies   Social History     Tobacco Use     Smoking status: Former Smoker     Smokeless tobacco: Never Used   Substance Use Topics     Alcohol use: No     Comment: none for 29 yrs      Wt Readings from Last 1 Encounters:   03/22/21 107 kg (235 lb 14.4 oz)        Anesthesia Evaluation   Pt has had prior anesthetic.     No history of anesthetic complications       ROS/MED HX  ENT/Pulmonary:     (+) BRAD risk factors, hypertension, obese, observed stopped breathing, moderate,  COPD,  (-) tobacco use   Neurologic: Comment: Dysphagia     (+) CVA, date: 1/2020, with deficits, - left side weakness.     Cardiovascular:     (+) Dyslipidemia hypertension-----    METS/Exercise Tolerance:     Hematologic:     (+) anemia,     Musculoskeletal:       GI/Hepatic:    (-) GERD and liver disease   Renal/Genitourinary:     (+) renal disease, type: CRI, BPH,     Endo:     (+) type II DM, Not using insulin, Obesity,     Psychiatric/Substance Use:     (+) psychiatric history depression     Infectious Disease:       Malignancy:       Other:            Physical Exam    Airway        Mallampati: III   TM distance: > 3 FB   Neck ROM: full   Mouth opening: > 3 cm    Respiratory Devices and Support         Dental       (+) missing, lower dentures and upper dentures      Cardiovascular          Rhythm and rate: regular     Pulmonary           (+) decreased breath sounds           OUTSIDE LABS:  CBC:   Lab Results   Component Value Date    WBC 9.6 03/22/2021    WBC 9.0 03/21/2021    HGB 9.9 (L) 03/22/2021    HGB 9.9 (L) 03/21/2021    HCT 33.0 (L) 03/22/2021    HCT 32.9 (L) 03/21/2021     03/22/2021     03/21/2021     BMP:   Lab Results   Component Value Date     03/22/2021     03/18/2021    POTASSIUM 4.5 03/22/2021    POTASSIUM 4.2 03/18/2021    CHLORIDE 102 03/22/2021    CHLORIDE 104 03/18/2021    CO2 32 03/22/2021    CO2 36 (H) 03/18/2021    BUN 27 03/22/2021    BUN 24 03/18/2021    CR 0.69 03/22/2021    CR  0.71 03/18/2021     (H) 03/22/2021     (H) 03/18/2021     COAGS:   Lab Results   Component Value Date    PTT 28 03/22/2021    INR 1.20 (H) 03/12/2021    FIBR 343 03/11/2021     POC:   Lab Results   Component Value Date     (H) 03/16/2021     HEPATIC:   Lab Results   Component Value Date    ALBUMIN 1.7 (L) 03/10/2021    PROTTOTAL 6.4 (L) 03/10/2021    ALT 25 03/10/2021    AST 51 (H) 03/10/2021    ALKPHOS 67 03/10/2021    BILITOTAL 1.2 03/10/2021     OTHER:   Lab Results   Component Value Date    PH 7.29 (L) 03/11/2021    LACT 1.3 03/11/2021    A1C 6.0 (H) 02/08/2021    RAJ 8.6 03/22/2021    PHOS 3.4 03/22/2021    MAG 2.2 03/22/2021    SED 5 07/13/2006       Anesthesia Plan    ASA Status:  3   NPO Status:  NPO Appropriate    Anesthesia Type: General.     - Airway: ETT   Induction: RSI, Propofol, Intravenous.   Maintenance: Balanced.   Techniques and Equipment:     - Airway: Video-Laryngoscope         Consents    Anesthesia Plan(s) and associated risks, benefits, and realistic alternatives discussed. Questions answered and patient/representative(s) expressed understanding.     - Discussed with:  Patient         Postoperative Care    Pain management: Multi-modal analgesia.   PONV prophylaxis: Ondansetron (or other 5HT-3), Background Propofol Infusion     Comments:                Edward Martin MD

## 2021-04-14 NOTE — OP NOTE
[facility] Urology Brief Operative Note    Pre-operative diagnosis: Right ureteral stone [N20.1]   Post-operative diagnosis: Same   Procedure: Procedure(s):  CYSTOSCOPY, BLADDER STONE REMOVAL, RIGHT URETEROSCOPY, HOLMIUM LASER LITHOTRIPSY, AND RIGHT STENT REMOVAL, RIGHT RETROGRADE   Surgeon: Rob Sullivan MD, MD   Assistant(s): none   Anesthesia: General endotracheal anesthesia   Estimated blood loss: None   Total IV fluids: (See anesthesia record)   Blood transfusion: No transfusion was given during surgery   Total urine output: Not measured   Drains: Cardona catheter   Specimens: Bladder stone   Implants: None   Findings: Stones from bladder removed.  Stones in right kidney treated with laser, stent removed.   Complications: None   Condition: Stable   Comments: See dictated operative report for full details

## 2021-04-14 NOTE — ANESTHESIA POSTPROCEDURE EVALUATION
Patient: Ron Gilmore    Procedure(s):  CYSTOSCOPY, BLADDER STONE REMOVAL, RIGHT URETEROSCOPY, HOLMIUM LASER LITHOTRIPSY, AND RIGHT STENT REMOVAL, RIGHT RETROGRADE    Diagnosis:Right ureteral stone [N20.1]  Diagnosis Additional Information: No value filed.    Anesthesia Type:  General    Note:  Disposition: Outpatient   Postop Pain Control: Uneventful            Sign Out: Well controlled pain   PONV: No   Neuro/Psych: Uneventful            Sign Out: Acceptable/Baseline neuro status   Airway/Respiratory: Uneventful            Sign Out: Acceptable/Baseline resp. status   CV/Hemodynamics: Uneventful            Sign Out: Acceptable CV status   Other NRE: NONE   DID A NON-ROUTINE EVENT OCCUR? No         Last vitals:  Vitals:    04/14/21 1200 04/14/21 1215 04/14/21 1230   BP: 115/77 123/74 121/72   Pulse: 99 104 99   Resp: 12 17 15   Temp: 36.3  C (97.3  F) 36.2  C (97.2  F) 36.3  C (97.3  F)   SpO2: 100% 98% 94%       Last vitals prior to Anesthesia Care Transfer:  CRNA VITALS  4/14/2021 1126 - 4/14/2021 1226      4/14/2021             NIBP:  105/76    Pulse:  99    SpO2:  100 %          Electronically Signed By: Edward Martin MD  April 14, 2021  12:42 PM

## 2021-04-14 NOTE — OP NOTE
Procedure Date: 04/14/2021      PROCEDURE:  Cystoscopy, removal of bladder stones, right ureteroscopy with holmium laser lithotripsy, right retrograde, right stent removal.      PREOPERATIVE DIAGNOSIS:  Bladder stones and right urolithiasis.      POSTOPERATIVE DIAGNOSIS:  Bladder stones and right urolithiasis.      SURGEON:  Rob Sullivan MD      OPERATIVE NOTE:  Informed consent was obtained from the patient.  He was brought to the operating room, given endotracheal anesthesia, placed in lithotomy position and his existing Cardona catheter was removed and sterile prep and drape were applied.  Cystoscope was introduced into the bladder and revealed a normal-appearing urethra, previous TUR prostate and several small bladder stones in the bladder.  These were flushed out through the cystoscope, collected and sent for stone analysis.  His existing stent on the right side was grasped and brought out partially through the urethra.  I advanced a sensor guidewire through this up to the kidney under fluoroscopy.  The stent was removed, leaving the guidewire in place.  I then passed a long rigid ureteroscope up the right ureter alongside the guidewire and did not visualize any stones contained within the ureter.  The ureteroscope was then removed.  An 11/13 x 46 cm ureteral access sheath was then placed over the guidewire with ease up to the right renal pelvis.  Contrast was injected to clarify the position and the guidewire and stylet were removed.  Flexible ureteroscope was then passed through the access sheath up into the right kidney and inspection was performed.  This revealed a cluster of stones in the lower portion of the kidney.  A 200 nanometer laser fiber was placed; laser timeout was taken and the stones were fragmented using a stone dusting setting of 0.4 joules x 50 Hz.  Stones fragmented very easily.  Contrast was injected through the scope again and then a thorough calicoscopy was performed in renal calices.  No  other significant stones or stone fragments were seen.  The scope was then withdrawn through the ureter and the ureter appeared unperturbed by the procedure, so I elected not to replace the stent.  Once the scope and access sheath had been removed an 18-Kinyarwanda Cardona catheter was placed into the bladder, balloon was inflated with 10 mL sterile water and the procedure was terminated at that point.  He tolerated the procedure well with no complications.  No measured blood loss.  Because of transportation issues with him he will stay overnight and be discharged in the morning.         IRVIN SAWYER MD             D: 2021   T: 2021   MT:       Name:     SHERI THORPE   MRN:      [id number]        Account:        [id number]   :      1940           Procedure Date: 2021      Document: [id number]

## 2021-04-15 VITALS
HEART RATE: 76 BPM | HEIGHT: 72 IN | SYSTOLIC BLOOD PRESSURE: 104 MMHG | RESPIRATION RATE: 18 BRPM | WEIGHT: 240.5 LBS | DIASTOLIC BLOOD PRESSURE: 59 MMHG | OXYGEN SATURATION: 90 % | BODY MASS INDEX: 32.57 KG/M2 | TEMPERATURE: 96 F

## 2021-04-15 LAB
COPATH REPORT: NORMAL
GLUCOSE BLDC GLUCOMTR-MCNC: 87 MG/DL (ref 70–99)

## 2021-04-15 PROCEDURE — G0378 HOSPITAL OBSERVATION PER HR: HCPCS

## 2021-04-15 PROCEDURE — 258N000003 HC RX IP 258 OP 636: Performed by: UROLOGY

## 2021-04-15 PROCEDURE — 250N000013 HC RX MED GY IP 250 OP 250 PS 637: Performed by: UROLOGY

## 2021-04-15 PROCEDURE — 250N000011 HC RX IP 250 OP 636: Performed by: UROLOGY

## 2021-04-15 PROCEDURE — 999N001017 HC STATISTIC GLUCOSE BY METER IP

## 2021-04-15 RX ORDER — SULFAMETHOXAZOLE/TRIMETHOPRIM 800-160 MG
1 TABLET ORAL 2 TIMES DAILY
Qty: 14 TABLET | Refills: 0 | Status: SHIPPED | OUTPATIENT
Start: 2021-04-15 | End: 2021-04-22

## 2021-04-15 RX ADMIN — ALLOPURINOL 300 MG: 300 TABLET ORAL at 08:42

## 2021-04-15 RX ADMIN — CEFAZOLIN 1 G: 1 INJECTION, POWDER, FOR SOLUTION INTRAMUSCULAR; INTRAVENOUS at 08:45

## 2021-04-15 RX ADMIN — METOPROLOL TARTRATE 12.5 MG: 25 TABLET, FILM COATED ORAL at 08:42

## 2021-04-15 RX ADMIN — ASPIRIN 325 MG: 325 TABLET, COATED ORAL at 08:42

## 2021-04-15 RX ADMIN — SODIUM CHLORIDE, POTASSIUM CHLORIDE, SODIUM LACTATE AND CALCIUM CHLORIDE: 600; 310; 30; 20 INJECTION, SOLUTION INTRAVENOUS at 06:23

## 2021-04-15 RX ADMIN — PAROXETINE HYDROCHLORIDE 20 MG: 20 TABLET, FILM COATED ORAL at 08:42

## 2021-04-15 NOTE — PLAN OF CARE
A&Ox4. Tmax 100.2 & tachycardic - tylenol given w/ success. All other VSS on 2L O2. Denies pain. DD1, honey thick liquids - tolerating well. Cardona patent w/ adequate UOP. Plan for discharge 4/15 @ 1600 w/ [employer] transport @ hospital door 2 - can be adjusted if discharge is delayed.

## 2021-04-15 NOTE — PLAN OF CARE
Patient discharged at 3:30 PM to Discharged to home  IV was discontinued by RN. Pain at time of discharge was 0/10. Belongings returned to patient.  Discharge instructions and medications reviewed with patient.  Patient verbalized understanding and all questions were answered.  Prescriptions given to patient.  At time of discharge, patient condition was stable and left the unit via personal WC escorted by NA and transported home by personal scheduled ride.

## 2021-04-15 NOTE — DISCHARGE SUMMARY
[facility]    Urology Progress Note     Assessment & Plan  Ron Gilmore is a 80 year old male POD#1 right URS with HLL for stones in the kidney and bladder. Stent removed at the end of procedure. Has chronic chirinos that was changed yd.     Plan:    Cont chirinos with changes q4-6 wk    Bactrim x7 days    Discharge home later today    RTC if fevers, chills    Michael Rizzo PA-C 4/15/2021 9:23 AM  [facility]  Mon-Fri, 7am - 4pm  Office: [phone]      Interval History   No pain overnight. Had a very short duration of temp of 100 last night. No chills. No catheter complaints. Plans to go home later today when transportation arrives.    Physical Exam   Temp: 96  F (35.6  C) Temp src: Oral BP: 104/59 Pulse: 76   Resp: 18 SpO2: 90 % O2 Device: None (Room air) Oxygen Delivery: 2 LPM  Vitals:    21 1000   Weight: 109.1 kg (240 lb 8 oz)     Vital Signs with Ranges  Temp:  [96  F (35.6  C)-100.2  F (37.9  C)] 96  F (35.6  C)  Pulse:  [] 76  Resp:  [12-22] 18  BP: (102-134)/(46-77) 104/59  SpO2:  [90 %-100 %] 90 %  I/O last 3 completed shifts:  In: 920 [P.O.:720; I.V.:200]  Out: 700 [Urine:700]    Temp (24hrs), Av.8  F (36.6  C), Min:96  F (35.6  C), Max:100.2  F (37.9  C)    GENERAL: Awake, alert, NAD. Lying in bed  NEURO: No facial asymmetry.  EYES: No icterus  HEAD, EARS, NOSE, MOUTH, AND THROAT: Atraumatic, normocephalic  NECK: Symmetric  CARDIAC: Skin well perfused  RESPIRATORY: Breathing unlabored  ABDOMEN: Obese, No SP tenderness.  BACK/FLANKS: No CVAT  : Chirinos in place draining clear allan urine.   SKIN/HAIR/NAILS:  No visible rashes  PSYCHIATRIC: Speech: normal Mood: normal Affect: normal        Medications     lactated ringers 100 mL/hr at 04/15/21 0623       allopurinol  300 mg Oral Daily     aspirin  325 mg Oral Daily     metoprolol tartrate  12.5 mg Oral BID     PARoxetine  20 mg Oral Daily     sodium chloride (PF)  3 mL Intracatheter Q8H       Data    Results for orders placed or performed during the hospital encounter of 04/14/21 (from the past 24 hour(s))   Glucose by meter   Result Value Ref Range    Glucose 118 (H) 70 - 99 mg/dL   EKG 12-lead, tracing only   Result Value Ref Range    Interpretation ECG Click View Image link to view waveform and result    XR Surgery TETO L/T 5 Min Fluoro w Stills    Narrative    SURGERY C-ARM FLUORO LESS THAN 5 MINUTES WITH STILLS  4/14/2021 11:45  AM     HISTORY: Right stent removal, retrograde and stone lasering.    COMPARISON: None.      Impression    IMPRESSION: Four spot fluoroscopic images obtained during retrograde  pyelogram. Ureteral stent is identified as well as contrast in a  nondilated collecting system. Total fluoroscopic time is 0.5 minutes.    KIRSTIE CARR MD   Glucose by meter   Result Value Ref Range    Glucose 87 70 - 99 mg/dL

## 2021-04-15 NOTE — PROGRESS NOTES
[facility]  Patient is currently open to home care services with [facility]. The patient is currently receiving RN/PT/OT services.  Patient's  and home health team have been notified that patient is under OBSERVATION STATUS. [facility] Liaison will continue to follow patient during stay. If patient is admitted to inpatient status please provide orders to resume home care at time of discharge if appropriate.

## 2021-04-15 NOTE — PLAN OF CARE
Patient arrived to station 88 at about 1330. POD#0 cysto/lithotripsy with Dr. Sullivan. A&Ox4. HR mildly tachy. All other VSS on 2L O2 via NC. Later weaned to room air. CAPNO removed/refused by patient. Denies pain. Tolerating DD1 diet with honey thick liquids. Cardona catheter patent, good output. L sided hemiplegia from previous stroke. Patient does not ambulate. Personal electric wheelchair at bedside. Turn and repo while in bed or up with lift/blower. Plan for discharge home tomorrow. Patient's spouse set up [employer] transport for 1600 tomorrow at hospital door 2. Adjust time if discharge is delayed.

## 2021-04-16 LAB — INTERPRETATION ECG - MUSE: NORMAL

## 2021-04-18 LAB
APPEARANCE STONE: NORMAL
COMPN STONE: NORMAL
NUMBER STONE: 3
SIZE STONE: NORMAL MM
WT STONE: 34 MG

## 2021-05-01 ENCOUNTER — HOSPITAL ENCOUNTER (INPATIENT)
Facility: CLINIC | Age: 81
LOS: 3 days | Discharge: NURSING, REHAB OR LONG-TERM CARE | DRG: 177 | End: 2021-05-04
Attending: EMERGENCY MEDICINE | Admitting: INTERNAL MEDICINE
Payer: COMMERCIAL

## 2021-05-01 ENCOUNTER — APPOINTMENT (OUTPATIENT)
Dept: GENERAL RADIOLOGY | Facility: CLINIC | Age: 81
DRG: 177 | End: 2021-05-01
Attending: EMERGENCY MEDICINE
Payer: COMMERCIAL

## 2021-05-01 DIAGNOSIS — T83.511A URINARY TRACT INFECTION ASSOCIATED WITH INDWELLING URETHRAL CATHETER, INITIAL ENCOUNTER (H): ICD-10-CM

## 2021-05-01 DIAGNOSIS — R11.10 VOMITING, INTRACTABILITY OF VOMITING NOT SPECIFIED, PRESENCE OF NAUSEA NOT SPECIFIED, UNSPECIFIED VOMITING TYPE: ICD-10-CM

## 2021-05-01 DIAGNOSIS — N39.0 URINARY TRACT INFECTION ASSOCIATED WITH INDWELLING URETHRAL CATHETER, INITIAL ENCOUNTER (H): ICD-10-CM

## 2021-05-01 DIAGNOSIS — U07.1 INFECTION DUE TO 2019 NOVEL CORONAVIRUS: Primary | ICD-10-CM

## 2021-05-01 DIAGNOSIS — J96.01 ACUTE RESPIRATORY FAILURE WITH HYPOXIA (H): ICD-10-CM

## 2021-05-01 LAB
ABO + RH BLD: NORMAL
ABO + RH BLD: NORMAL
ALBUMIN SERPL-MCNC: 2.5 G/DL (ref 3.4–5)
ALBUMIN UR-MCNC: 50 MG/DL
ALP SERPL-CCNC: 60 U/L (ref 40–150)
ALT SERPL W P-5'-P-CCNC: 24 U/L (ref 0–70)
ANION GAP SERPL CALCULATED.3IONS-SCNC: 4 MMOL/L (ref 3–14)
ANISOCYTOSIS BLD QL SMEAR: SLIGHT
APPEARANCE UR: ABNORMAL
AST SERPL W P-5'-P-CCNC: 29 U/L (ref 0–45)
BACTERIA #/AREA URNS HPF: ABNORMAL /HPF
BASOPHILS # BLD AUTO: 0 10E9/L (ref 0–0.2)
BASOPHILS NFR BLD AUTO: 0 %
BILIRUB SERPL-MCNC: 0.3 MG/DL (ref 0.2–1.3)
BILIRUB UR QL STRIP: NEGATIVE
BUN SERPL-MCNC: 19 MG/DL (ref 7–30)
CALCIUM SERPL-MCNC: 8.1 MG/DL (ref 8.5–10.1)
CAOX CRY #/AREA URNS HPF: ABNORMAL /HPF
CHLORIDE SERPL-SCNC: 104 MMOL/L (ref 94–109)
CO2 SERPL-SCNC: 28 MMOL/L (ref 20–32)
COLOR UR AUTO: YELLOW
CREAT SERPL-MCNC: 0.71 MG/DL (ref 0.66–1.25)
CRP SERPL-MCNC: 83.2 MG/L (ref 0–8)
DIFFERENTIAL METHOD BLD: ABNORMAL
EOSINOPHIL # BLD AUTO: 0 10E9/L (ref 0–0.7)
EOSINOPHIL NFR BLD AUTO: 0 %
ERYTHROCYTE [DISTWIDTH] IN BLOOD BY AUTOMATED COUNT: 16.4 % (ref 10–15)
FLUAV RNA RESP QL NAA+PROBE: NEGATIVE
FLUBV RNA RESP QL NAA+PROBE: NEGATIVE
GFR SERPL CREATININE-BSD FRML MDRD: 89 ML/MIN/{1.73_M2}
GLUCOSE SERPL-MCNC: 117 MG/DL (ref 70–99)
GLUCOSE UR STRIP-MCNC: NEGATIVE MG/DL
HCT VFR BLD AUTO: 35.8 % (ref 40–53)
HGB BLD-MCNC: 10.7 G/DL (ref 13.3–17.7)
HGB UR QL STRIP: ABNORMAL
HYALINE CASTS #/AREA URNS LPF: 1 /LPF (ref 0–2)
HYPOCHROMIA BLD QL: PRESENT
KETONES UR STRIP-MCNC: NEGATIVE MG/DL
LABORATORY COMMENT REPORT: ABNORMAL
LACTATE BLD-SCNC: 1.6 MMOL/L (ref 0.7–2)
LEUKOCYTE ESTERASE UR QL STRIP: ABNORMAL
LYMPHOCYTES # BLD AUTO: 2.4 10E9/L (ref 0.8–5.3)
LYMPHOCYTES NFR BLD AUTO: 29 %
MCH RBC QN AUTO: 26.6 PG (ref 26.5–33)
MCHC RBC AUTO-ENTMCNC: 29.9 G/DL (ref 31.5–36.5)
MCV RBC AUTO: 89 FL (ref 78–100)
MICROCYTES BLD QL SMEAR: PRESENT
MONOCYTES # BLD AUTO: 0.2 10E9/L (ref 0–1.3)
MONOCYTES NFR BLD AUTO: 2 %
MUCOUS THREADS #/AREA URNS LPF: PRESENT /LPF
NEUTROPHILS # BLD AUTO: 5.8 10E9/L (ref 1.6–8.3)
NEUTROPHILS NFR BLD AUTO: 69 %
NITRATE UR QL: NEGATIVE
NT-PROBNP SERPL-MCNC: 664 PG/ML (ref 0–1800)
PH UR STRIP: 5.5 PH (ref 5–7)
PLATELET # BLD AUTO: 201 10E9/L (ref 150–450)
PLATELET # BLD EST: ABNORMAL 10*3/UL
POTASSIUM SERPL-SCNC: 3.9 MMOL/L (ref 3.4–5.3)
PROCALCITONIN SERPL-MCNC: <0.05 NG/ML
PROT SERPL-MCNC: 7.2 G/DL (ref 6.8–8.8)
RBC # BLD AUTO: 4.02 10E12/L (ref 4.4–5.9)
RBC #/AREA URNS AUTO: 12 /HPF (ref 0–2)
RSV RNA SPEC QL NAA+PROBE: ABNORMAL
SARS-COV-2 RNA RESP QL NAA+PROBE: POSITIVE
SMUDGE CELLS BLD QL SMEAR: PRESENT
SODIUM SERPL-SCNC: 136 MMOL/L (ref 133–144)
SOURCE: ABNORMAL
SP GR UR STRIP: 1.02 (ref 1–1.03)
SPECIMEN EXP DATE BLD: NORMAL
SPECIMEN SOURCE: ABNORMAL
SQUAMOUS #/AREA URNS AUTO: <1 /HPF (ref 0–1)
TROPONIN I SERPL-MCNC: <0.015 UG/L (ref 0–0.04)
UROBILINOGEN UR STRIP-MCNC: 0 MG/DL (ref 0–2)
VARIANT LYMPHS BLD QL SMEAR: PRESENT
WBC # BLD AUTO: 8.4 10E9/L (ref 4–11)
WBC #/AREA URNS AUTO: 62 /HPF (ref 0–5)
YEAST #/AREA URNS HPF: ABNORMAL /HPF

## 2021-05-01 PROCEDURE — 80053 COMPREHEN METABOLIC PANEL: CPT | Performed by: EMERGENCY MEDICINE

## 2021-05-01 PROCEDURE — 250N000009 HC RX 250: Performed by: INTERNAL MEDICINE

## 2021-05-01 PROCEDURE — 84484 ASSAY OF TROPONIN QUANT: CPT | Performed by: INTERNAL MEDICINE

## 2021-05-01 PROCEDURE — 36415 COLL VENOUS BLD VENIPUNCTURE: CPT | Performed by: INTERNAL MEDICINE

## 2021-05-01 PROCEDURE — 87086 URINE CULTURE/COLONY COUNT: CPT | Performed by: EMERGENCY MEDICINE

## 2021-05-01 PROCEDURE — C9803 HOPD COVID-19 SPEC COLLECT: HCPCS

## 2021-05-01 PROCEDURE — 258N000003 HC RX IP 258 OP 636: Performed by: EMERGENCY MEDICINE

## 2021-05-01 PROCEDURE — 96375 TX/PRO/DX INJ NEW DRUG ADDON: CPT

## 2021-05-01 PROCEDURE — 86900 BLOOD TYPING SEROLOGIC ABO: CPT | Performed by: INTERNAL MEDICINE

## 2021-05-01 PROCEDURE — 84145 PROCALCITONIN (PCT): CPT | Performed by: EMERGENCY MEDICINE

## 2021-05-01 PROCEDURE — 87186 SC STD MICRODIL/AGAR DIL: CPT | Performed by: EMERGENCY MEDICINE

## 2021-05-01 PROCEDURE — 96361 HYDRATE IV INFUSION ADD-ON: CPT

## 2021-05-01 PROCEDURE — 250N000011 HC RX IP 250 OP 636: Performed by: INTERNAL MEDICINE

## 2021-05-01 PROCEDURE — 83880 ASSAY OF NATRIURETIC PEPTIDE: CPT | Performed by: EMERGENCY MEDICINE

## 2021-05-01 PROCEDURE — 85025 COMPLETE CBC W/AUTO DIFF WBC: CPT | Performed by: EMERGENCY MEDICINE

## 2021-05-01 PROCEDURE — 81001 URINALYSIS AUTO W/SCOPE: CPT | Performed by: EMERGENCY MEDICINE

## 2021-05-01 PROCEDURE — 86901 BLOOD TYPING SEROLOGIC RH(D): CPT | Performed by: INTERNAL MEDICINE

## 2021-05-01 PROCEDURE — 36415 COLL VENOUS BLD VENIPUNCTURE: CPT

## 2021-05-01 PROCEDURE — 87088 URINE BACTERIA CULTURE: CPT | Performed by: EMERGENCY MEDICINE

## 2021-05-01 PROCEDURE — 87106 FUNGI IDENTIFICATION YEAST: CPT | Performed by: EMERGENCY MEDICINE

## 2021-05-01 PROCEDURE — 71045 X-RAY EXAM CHEST 1 VIEW: CPT

## 2021-05-01 PROCEDURE — 250N000011 HC RX IP 250 OP 636: Performed by: EMERGENCY MEDICINE

## 2021-05-01 PROCEDURE — 96365 THER/PROPH/DIAG IV INF INIT: CPT

## 2021-05-01 PROCEDURE — 87636 SARSCOV2 & INF A&B AMP PRB: CPT | Performed by: EMERGENCY MEDICINE

## 2021-05-01 PROCEDURE — 258N000003 HC RX IP 258 OP 636: Performed by: INTERNAL MEDICINE

## 2021-05-01 PROCEDURE — 99285 EMERGENCY DEPT VISIT HI MDM: CPT | Mod: 25

## 2021-05-01 PROCEDURE — 87040 BLOOD CULTURE FOR BACTERIA: CPT | Performed by: EMERGENCY MEDICINE

## 2021-05-01 PROCEDURE — 86140 C-REACTIVE PROTEIN: CPT | Performed by: EMERGENCY MEDICINE

## 2021-05-01 PROCEDURE — 83605 ASSAY OF LACTIC ACID: CPT | Performed by: EMERGENCY MEDICINE

## 2021-05-01 PROCEDURE — 120N000001 HC R&B MED SURG/OB

## 2021-05-01 RX ORDER — POLYETHYLENE GLYCOL 3350 17 G/17G
17 POWDER, FOR SOLUTION ORAL DAILY
Status: DISCONTINUED | OUTPATIENT
Start: 2021-05-02 | End: 2021-05-04 | Stop reason: HOSPADM

## 2021-05-01 RX ORDER — ATORVASTATIN CALCIUM 10 MG/1
10 TABLET, FILM COATED ORAL DAILY
Status: DISCONTINUED | OUTPATIENT
Start: 2021-05-02 | End: 2021-05-04 | Stop reason: HOSPADM

## 2021-05-01 RX ORDER — CEFTRIAXONE 1 G/1
1 INJECTION, POWDER, FOR SOLUTION INTRAMUSCULAR; INTRAVENOUS EVERY 24 HOURS
Status: DISCONTINUED | OUTPATIENT
Start: 2021-05-02 | End: 2021-05-04 | Stop reason: HOSPADM

## 2021-05-01 RX ORDER — PAROXETINE 20 MG/1
20 TABLET, FILM COATED ORAL DAILY
Status: DISCONTINUED | OUTPATIENT
Start: 2021-05-02 | End: 2021-05-04 | Stop reason: HOSPADM

## 2021-05-01 RX ORDER — ONDANSETRON 2 MG/ML
4 INJECTION INTRAMUSCULAR; INTRAVENOUS ONCE
Status: COMPLETED | OUTPATIENT
Start: 2021-05-01 | End: 2021-05-01

## 2021-05-01 RX ORDER — AMOXICILLIN 250 MG
2 CAPSULE ORAL 2 TIMES DAILY PRN
Status: DISCONTINUED | OUTPATIENT
Start: 2021-05-01 | End: 2021-05-04 | Stop reason: HOSPADM

## 2021-05-01 RX ORDER — LIDOCAINE 40 MG/G
CREAM TOPICAL
Status: DISCONTINUED | OUTPATIENT
Start: 2021-05-01 | End: 2021-05-04 | Stop reason: HOSPADM

## 2021-05-01 RX ORDER — GUAIFENESIN 600 MG/1
600 TABLET, EXTENDED RELEASE ORAL 2 TIMES DAILY
Status: DISCONTINUED | OUTPATIENT
Start: 2021-05-01 | End: 2021-05-04 | Stop reason: HOSPADM

## 2021-05-01 RX ORDER — ACETAMINOPHEN 325 MG/1
650 TABLET ORAL EVERY 4 HOURS PRN
Status: DISCONTINUED | OUTPATIENT
Start: 2021-05-01 | End: 2021-05-04 | Stop reason: HOSPADM

## 2021-05-01 RX ORDER — AMOXICILLIN 250 MG
1 CAPSULE ORAL 2 TIMES DAILY PRN
Status: DISCONTINUED | OUTPATIENT
Start: 2021-05-01 | End: 2021-05-04 | Stop reason: HOSPADM

## 2021-05-01 RX ORDER — ATORVASTATIN CALCIUM 10 MG/1
10 TABLET, FILM COATED ORAL EVERY MORNING
COMMUNITY

## 2021-05-01 RX ORDER — ALLOPURINOL 300 MG/1
300 TABLET ORAL DAILY
Status: DISCONTINUED | OUTPATIENT
Start: 2021-05-02 | End: 2021-05-04 | Stop reason: HOSPADM

## 2021-05-01 RX ORDER — ACETAMINOPHEN 650 MG/1
650 SUPPOSITORY RECTAL EVERY 4 HOURS PRN
Status: DISCONTINUED | OUTPATIENT
Start: 2021-05-01 | End: 2021-05-04 | Stop reason: HOSPADM

## 2021-05-01 RX ORDER — ONDANSETRON 2 MG/ML
4 INJECTION INTRAMUSCULAR; INTRAVENOUS EVERY 6 HOURS PRN
Status: DISCONTINUED | OUTPATIENT
Start: 2021-05-01 | End: 2021-05-04 | Stop reason: HOSPADM

## 2021-05-01 RX ORDER — ONDANSETRON 4 MG/1
4 TABLET, ORALLY DISINTEGRATING ORAL EVERY 6 HOURS PRN
Status: DISCONTINUED | OUTPATIENT
Start: 2021-05-01 | End: 2021-05-04 | Stop reason: HOSPADM

## 2021-05-01 RX ORDER — CEFTRIAXONE 2 G/1
2 INJECTION, POWDER, FOR SOLUTION INTRAMUSCULAR; INTRAVENOUS ONCE
Status: COMPLETED | OUTPATIENT
Start: 2021-05-01 | End: 2021-05-01

## 2021-05-01 RX ORDER — DEXAMETHASONE SODIUM PHOSPHATE 10 MG/ML
6 INJECTION, SOLUTION INTRAMUSCULAR; INTRAVENOUS ONCE
Status: COMPLETED | OUTPATIENT
Start: 2021-05-01 | End: 2021-05-01

## 2021-05-01 RX ADMIN — DEXAMETHASONE SODIUM PHOSPHATE 6 MG: 10 INJECTION, SOLUTION INTRAMUSCULAR; INTRAVENOUS at 19:01

## 2021-05-01 RX ADMIN — ONDANSETRON 4 MG: 2 INJECTION INTRAMUSCULAR; INTRAVENOUS at 16:04

## 2021-05-01 RX ADMIN — CEFTRIAXONE SODIUM 2 G: 2 INJECTION, POWDER, FOR SOLUTION INTRAMUSCULAR; INTRAVENOUS at 18:26

## 2021-05-01 RX ADMIN — REMDESIVIR 200 MG: 100 INJECTION, POWDER, LYOPHILIZED, FOR SOLUTION INTRAVENOUS at 22:32

## 2021-05-01 RX ADMIN — SODIUM CHLORIDE 1000 ML: 9 INJECTION, SOLUTION INTRAVENOUS at 16:02

## 2021-05-01 RX ADMIN — ENOXAPARIN SODIUM 40 MG: 40 INJECTION SUBCUTANEOUS at 22:34

## 2021-05-01 RX ADMIN — SODIUM CHLORIDE 50 ML: 9 INJECTION, SOLUTION INTRAVENOUS at 23:28

## 2021-05-01 ASSESSMENT — MIFFLIN-ST. JEOR
SCORE: 1846.63
SCORE: 1827

## 2021-05-01 NOTE — ED NOTES
Bed: ED04  Expected date:   Expected time:   Means of arrival:   Comments:  [facility]: 80M, fever 102, 87% on R/A, history sepsis. ETA 1534

## 2021-05-01 NOTE — ED TRIAGE NOTES
"Pt here from home by EMS for vomiting and Low oxygen sats. Per EMs report pt lives with his daughter and started vomiting today. Upon arrival of EMs pt's sats were reported to be \" low 80% on RA\". Pt was placed on 4L NC which corrected this and improved his sats to 95%. Pt has a hx of strokes and has L sided deficits. Upon arrival to ED pt is actively vomiting. Pt denies CP, SOB, or pain. His sats are true to be 85% on RA and improves with oxygen. Pt received 4Mg Iv Zofran in the field and a repeat dose from ED on his arrival. Total of 8Mg Iv zofran. Pt is Alert, follows commands, and answers questions approprietly. Pt states he has had his first COVID vaccine shot but missed his 2nd shot due to being hospitalized in April. Pt has indwelling cath, which has yellow urine in chirinos bag. Pt mildly tachycardic at 106. IV NS bolus initiated. See mar.  "

## 2021-05-01 NOTE — ED NOTES
[facility]  ED Nurse Handoff Report    ED Chief complaint: Vomiting and Shortness of Breath      ED Diagnosis:   Final diagnoses:   None       Code Status: see prior    Allergies: No Known Allergies    Patient Story: Pt here from home for hypoxia and vomiting. Pt developed N/V today and was found by EMS to hypoxic oat 85% on RA. Upon arrival pt vomiting and requiring oxygen to maintain sats. Hx of copd and stroke. Was admitted a month ago for Sepsis.  Focused Assessment:  Pt Aox3. Baseline left side deficits. Hypoxic on RA. 4L NC maintain sats. Mildly tachycardic at 109. No longer vomiting after 8MG Zofran total. WBC wnl. Lactate WNL. Congested sounding upper LS. Covid +.   Labs Ordered and Resulted from Time of ED Arrival Up to the Time of Departure from the ED   COMPREHENSIVE METABOLIC PANEL - Abnormal; Notable for the following components:       Result Value    Glucose 117 (*)     Calcium 8.1 (*)     Albumin 2.5 (*)     All other components within normal limits   CBC WITH PLATELETS DIFFERENTIAL - Abnormal; Notable for the following components:    RBC Count 4.02 (*)     Hemoglobin 10.7 (*)     Hematocrit 35.8 (*)     MCHC 29.9 (*)     RDW 16.4 (*)     All other components within normal limits   ROUTINE UA WITH MICROSCOPIC - Abnormal; Notable for the following components:    Blood Urine Trace (*)     Protein Albumin Urine 50 (*)     Leukocyte Esterase Urine Large (*)     WBC Urine 62 (*)     RBC Urine 12 (*)     Bacteria Urine Few (*)     Yeast Urine Few (*)     Mucous Urine Present (*)     Calcium Oxalate Few (*)     All other components within normal limits   INFLUENZA A/B & SARS-COV2 PCR MULTIPLEX - Abnormal; Notable for the following components:    SARS-CoV-2 PCR Result POSITIVE (*)     All other components within normal limits   LACTIC ACID WHOLE BLOOD   PROCALCITONIN   NT PROBNP INPATIENT   BLOOD CULTURE   BLOOD CULTURE   URINE CULTURE AEROBIC BACTERIAL         Treatments and/or interventions  provided: See mar  Patient's response to treatments and/or interventions: No longer vomiting. Iv fluids helped minimally to reduce HR.    To be done/followed up on inpatient unit:  n/a    Does this patient have any cognitive concerns?: n/a    Activity level - Baseline/Home:  Wheelchair  Activity Level - Current:   Unknown    Patient's Preferred language: English   Needed?: No    Isolation: None and COVID r/o and special precautions  Infection: Not Applicable  COVID r/o and special precautions  Patient tested for COVID 19 prior to admission: YES  Bariatric?: No    Vital Signs:   Vitals:    05/01/21 1552 05/01/21 1553 05/01/21 1814   BP:  139/81 128/75   Pulse:  108 95   Resp: 22 16    Temp:  99  F (37.2  C)    TempSrc:  Oral    SpO2: (!) 84% 94% 96%   Weight:  108.9 kg (240 lb)    Height:  1.829 m (6')        Cardiac Rhythm:     Was the PSS-3 completed:   Yes  What interventions are required if any?               Family Comments: Daughter López marcus. Her number is in nursing note.  OBS brochure/video discussed/provided to patient/family: N/A              Name of person given brochure if not patient: na              Relationship to patient: na    For the majority of the shift this patient's behavior was Green.   Behavioral interventions performed were     ED NURSE PHONE NUMBER:[id number]

## 2021-05-01 NOTE — ED PROVIDER NOTES
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  The patient currently lives at home with his wife, but has frequent home care visits.  The home care RN today was concerned because his breathing was \"raspy.\"  The patient has been more short of breath yesterday and today, and had fever to 102F yesterday and 101.7F today.  He has been coughing, and vomiting after coughing.  He has not vomited otherwise.  He found it difficult to sleep last night secondary to difficulty breathing.  He denies chest pain, abdominal pain, headache, myalgias, dysuria, and diarrhea.  He currently has an indwelling chirinos catheter which has been functioning well.  He has received the first dose of the COVID vaccine, but was hospitalized when he was supposed to receive his second dose.  His wife has not been ill.    Review of Systems   Constitutional: Positive for fatigue and fever.   Respiratory: Positive for cough and shortness of breath. Negative for chest tightness.    Cardiovascular: Negative for chest pain and leg swelling.   Gastrointestinal: Positive for vomiting. Negative for abdominal pain and diarrhea.   Genitourinary: Negative for decreased urine volume, flank pain and penile pain.   Neurological: Negative for light-headedness.   Psychiatric/Behavioral: Negative for confusion.   All other systems reviewed and are negative.      Allergies:  The patient has no known allergies.     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  Muscle weakness  Neuropathy, diabetic  Obesity  Spinal stenosis  Stroke  Unsteady gait  UTI  Major depressive disorder  SIRS  Vasovagal episode  CAP  Closed tibia fracture  CVA  Left-sided weakness  Type 2 diabetes  Morbid obesity  Obstructive right sided ureteral stone resulting in hydronephrosis  Metabolic encephalopathy  Lactic acidosis  Severe sepsis    Past Surgical History:    Tonsillectomy  Laser holmium lithotripsy ureters, insert stent, combined  Laminectomy lumbar one level  Knee surgery, bilateral  Hip replacement, right  Ureteral stent placement, right   Nephrostomy tube placement, right  Right eye lid surgery  Loop recorder implant  Cystoscopy, transurethral resection prostate, combined  Cystoscopy  Colonoscopy  Cholecystectomy  Cataracts  Arthroscopy shoulder rotator cuff repair  Appendectomy    Family History:    Father: Prostate cancer    Social History:  Patient presents to the ED via EMS.  Patient lives with his daughter.     Physical Exam     Patient Vitals for the past 24 hrs:   BP Temp Temp src Pulse Resp SpO2 Height Weight   05/01/21 1814 128/75 -- -- 95 -- 96 % -- --   05/01/21 1553 139/81 99  F (37.2  C) Oral 108 16 94 % 1.829 m (6') 108.9 kg (240 lb)   05/01/21 1552 -- -- -- -- 22 (!) 84 % -- --       Physical Exam  Gen:  Alert, pleasant, appears chronically ill    Eye:   Sclera non-injected.    Cardiac:     Normal rate and regular rhythm.    No murmurs, gallops, or rubs.    Pulmonary:     Coarse breath sounds bilaterally.    Slightly tachypneic.    Abdomen:     Normal active bowel sounds.     Abdomen is soft and non-distended, without focal tenderness.    Musculoskeletal:     Normal movement of all extremities without evidence for deficit.    Extremities:    No edema.    Skin:   Warm and dry.    Neurologic:    Non-focal exam without asymmetric weakness or numbness.    Normal tone   Normal speech and cognition.   Chronic left sided weakness.    Psychiatric:     Normal affect with  appropriate interaction with examiner.    Emergency Department Course     Imaging:  XR Chest Port 1 View  Shallow inspiration accentuates heart size and pulmonary  vascularity. Lungs clear. No pneumothorax. Previously noted right IJ  central venous catheter has been removed.  Reading per radiology.     Laboratory:  UA with Microscopic: Blood Trace (A), Protein Albumin 50 (A), Leukocyte Esterase Large (A), WBC 62 (H), RBC 12 (H), Bacteria Few (A), Yeast Few (A), Mucous Urine Present (A), Calcium oxalate Few (A) o/w Negative    Urine Culture x2: Pening    CBC: WBC 8.4, HGB 10.7 (L),     CMP: Glucose 117 (H), Calcium 8.1 (L), Albumin 2.5 (L) o/w WNL (Creatinine 0.71)    Lactic acid (Collected 1558): 1.6    BNP: 664    Procalcitonin: <0.05    Blood Cultures x2: No growth    Symptomatic influenza A/B COVID: Positive      Emergency Department Course:  Reviewed:  I reviewed nursing notes, vitals, past medical history and care everywhere    Assessments:  1638 I obtained history and examined the patient as noted above.     1935 I rechecked and updated the patient regarding admission.    Consults:   1854 I consulted with Dr. Samson, hospitalist, regarding the patient's history and presentation in the ED.     Interventions:  1602 NS 1L IV  1604 Zofran 4 mg IV  1826 Rocephin 2 g IV  1901 Decadron 6 mg IV    Disposition:  The patient was admitted to the hospital under the care of Dr. Samson.     Impression & Plan     CMS Diagnoses: None    Medical Decision Making:  Ron Gilmore is a 80 year old male with recent history of urosepsis secondary to obstructive uropathy from a kidney stone, CVA, type 2 diabetes mellitus, pneumonia who presents today with hypoxia and increased work of breathing, as well as fever and vomiting. On exam, the patient was hypoxic to the mid 80s on room air, mid 90s on 4 L. His coarse breath sounds bilaterally, but no signs of fluid overflow. Labs demonstrate decreased albumin, and normal renal  function. White blood cell count is normal, though he is slightly anemic. This appears to be baseline.  Chest radiograph does not demonstrate pulmonary edema or bacterial pneumonia.  Before COVID testing returned, he was started on antibiotics given recent history of urosepsis and fever. COVID-19 testing returned positive. This is clinically consistent with his presentation.  Although UA demonstrates inflammation, this is likely chronic, and current symptoms are more likely to be related to COVID 19.  Unfortunately, the patient has only received one dose of the Pfizer vaccine because of his intervening hospitalization. He will be admitted for continued respiratory support, IV dexamethasone, possibly remdisivir. At this point, he is stable for med surg floor. There are not currently any beds available at [facility].      Diagnosis:    ICD-10-CM    1. Infection due to 2019 novel coronavirus  U07.1 Urine Culture Aerobic Bacterial   2. Acute respiratory failure with hypoxia (H)  J96.01    3. Vomiting, intractability of vomiting not specified, presence of nausea not specified, unspecified vomiting type  R11.10          Scribe Disclosure:  I, Mamta Gaston, am serving as a scribe at 4:12 PM on 5/1/2021 to document services personally performed by No att. providers found based on my observations and the provider's statements to me.      Corinna Powers MD  05/02/21 1205       Corinna Powers MD  05/02/21 1210

## 2021-05-02 ENCOUNTER — APPOINTMENT (OUTPATIENT)
Dept: SPEECH THERAPY | Facility: CLINIC | Age: 81
DRG: 177 | End: 2021-05-02
Attending: INTERNAL MEDICINE
Payer: COMMERCIAL

## 2021-05-02 LAB
ANION GAP SERPL CALCULATED.3IONS-SCNC: <1 MMOL/L (ref 3–14)
BUN SERPL-MCNC: 18 MG/DL (ref 7–30)
CALCIUM SERPL-MCNC: 8.8 MG/DL (ref 8.5–10.1)
CHLORIDE SERPL-SCNC: 104 MMOL/L (ref 94–109)
CO2 SERPL-SCNC: 34 MMOL/L (ref 20–32)
CREAT SERPL-MCNC: 0.7 MG/DL (ref 0.66–1.25)
CRP SERPL-MCNC: 82.9 MG/L (ref 0–8)
D DIMER PPP FEU-MCNC: 1.1 UG/ML FEU (ref 0–0.5)
ERYTHROCYTE [DISTWIDTH] IN BLOOD BY AUTOMATED COUNT: 16.3 % (ref 10–15)
FIBRINOGEN PPP-MCNC: 610 MG/DL (ref 200–420)
GFR SERPL CREATININE-BSD FRML MDRD: 90 ML/MIN/{1.73_M2}
GLUCOSE BLDC GLUCOMTR-MCNC: 124 MG/DL (ref 70–99)
GLUCOSE BLDC GLUCOMTR-MCNC: 127 MG/DL (ref 70–99)
GLUCOSE BLDC GLUCOMTR-MCNC: 141 MG/DL (ref 70–99)
GLUCOSE BLDC GLUCOMTR-MCNC: 162 MG/DL (ref 70–99)
GLUCOSE BLDC GLUCOMTR-MCNC: 177 MG/DL (ref 70–99)
GLUCOSE BLDC GLUCOMTR-MCNC: 191 MG/DL (ref 70–99)
GLUCOSE SERPL-MCNC: 139 MG/DL (ref 70–99)
HCT VFR BLD AUTO: 37.7 % (ref 40–53)
HGB BLD-MCNC: 10.9 G/DL (ref 13.3–17.7)
MCH RBC QN AUTO: 26.7 PG (ref 26.5–33)
MCHC RBC AUTO-ENTMCNC: 28.9 G/DL (ref 31.5–36.5)
MCV RBC AUTO: 92 FL (ref 78–100)
PLATELET # BLD AUTO: 190 10E9/L (ref 150–450)
POTASSIUM SERPL-SCNC: 4.8 MMOL/L (ref 3.4–5.3)
RBC # BLD AUTO: 4.09 10E12/L (ref 4.4–5.9)
SODIUM SERPL-SCNC: 136 MMOL/L (ref 133–144)
WBC # BLD AUTO: 5.6 10E9/L (ref 4–11)

## 2021-05-02 PROCEDURE — XW033E5 INTRODUCTION OF REMDESIVIR ANTI-INFECTIVE INTO PERIPHERAL VEIN, PERCUTANEOUS APPROACH, NEW TECHNOLOGY GROUP 5: ICD-10-PCS | Performed by: INTERNAL MEDICINE

## 2021-05-02 PROCEDURE — 80048 BASIC METABOLIC PNL TOTAL CA: CPT | Performed by: INTERNAL MEDICINE

## 2021-05-02 PROCEDURE — 250N000011 HC RX IP 250 OP 636: Performed by: INTERNAL MEDICINE

## 2021-05-02 PROCEDURE — 99223 1ST HOSP IP/OBS HIGH 75: CPT | Mod: AI | Performed by: INTERNAL MEDICINE

## 2021-05-02 PROCEDURE — 258N000003 HC RX IP 258 OP 636: Performed by: INTERNAL MEDICINE

## 2021-05-02 PROCEDURE — 250N000013 HC RX MED GY IP 250 OP 250 PS 637: Performed by: INTERNAL MEDICINE

## 2021-05-02 PROCEDURE — 99207 PR APP CREDIT; MD BILLING SHARED VISIT: CPT | Performed by: INTERNAL MEDICINE

## 2021-05-02 PROCEDURE — 86140 C-REACTIVE PROTEIN: CPT | Performed by: INTERNAL MEDICINE

## 2021-05-02 PROCEDURE — 92610 EVALUATE SWALLOWING FUNCTION: CPT | Mod: GN | Performed by: SPEECH-LANGUAGE PATHOLOGIST

## 2021-05-02 PROCEDURE — 250N000009 HC RX 250: Performed by: INTERNAL MEDICINE

## 2021-05-02 PROCEDURE — 85379 FIBRIN DEGRADATION QUANT: CPT | Performed by: INTERNAL MEDICINE

## 2021-05-02 PROCEDURE — 120N000001 HC R&B MED SURG/OB

## 2021-05-02 PROCEDURE — 36415 COLL VENOUS BLD VENIPUNCTURE: CPT | Performed by: INTERNAL MEDICINE

## 2021-05-02 PROCEDURE — 85384 FIBRINOGEN ACTIVITY: CPT | Performed by: INTERNAL MEDICINE

## 2021-05-02 PROCEDURE — 85027 COMPLETE CBC AUTOMATED: CPT | Performed by: INTERNAL MEDICINE

## 2021-05-02 PROCEDURE — 999N001017 HC STATISTIC GLUCOSE BY METER IP

## 2021-05-02 PROCEDURE — 250N000012 HC RX MED GY IP 250 OP 636 PS 637: Performed by: INTERNAL MEDICINE

## 2021-05-02 RX ORDER — ALBUTEROL SULFATE 90 UG/1
2 AEROSOL, METERED RESPIRATORY (INHALATION) EVERY 6 HOURS PRN
Status: DISCONTINUED | OUTPATIENT
Start: 2021-05-02 | End: 2021-05-04 | Stop reason: HOSPADM

## 2021-05-02 RX ORDER — DEXTROSE MONOHYDRATE 25 G/50ML
25-50 INJECTION, SOLUTION INTRAVENOUS
Status: DISCONTINUED | OUTPATIENT
Start: 2021-05-02 | End: 2021-05-04 | Stop reason: HOSPADM

## 2021-05-02 RX ORDER — NICOTINE POLACRILEX 4 MG
15-30 LOZENGE BUCCAL
Status: DISCONTINUED | OUTPATIENT
Start: 2021-05-02 | End: 2021-05-04 | Stop reason: HOSPADM

## 2021-05-02 RX ADMIN — ALLOPURINOL 300 MG: 300 TABLET ORAL at 10:45

## 2021-05-02 RX ADMIN — GUAIFENESIN 600 MG: 600 TABLET, EXTENDED RELEASE ORAL at 10:52

## 2021-05-02 RX ADMIN — IPRATROPIUM BROMIDE AND ALBUTEROL 2 PUFF: 20; 100 SPRAY, METERED RESPIRATORY (INHALATION) at 22:40

## 2021-05-02 RX ADMIN — METOPROLOL TARTRATE 12.5 MG: 25 TABLET, FILM COATED ORAL at 20:12

## 2021-05-02 RX ADMIN — PAROXETINE HYDROCHLORIDE 20 MG: 20 TABLET, FILM COATED ORAL at 10:44

## 2021-05-02 RX ADMIN — IPRATROPIUM BROMIDE AND ALBUTEROL 2 PUFF: 20; 100 SPRAY, METERED RESPIRATORY (INHALATION) at 10:52

## 2021-05-02 RX ADMIN — SODIUM CHLORIDE 50 ML: 900 INJECTION INTRAVENOUS at 18:09

## 2021-05-02 RX ADMIN — ASPIRIN 325 MG: 325 TABLET, COATED ORAL at 10:44

## 2021-05-02 RX ADMIN — ATORVASTATIN CALCIUM 10 MG: 10 TABLET, FILM COATED ORAL at 10:44

## 2021-05-02 RX ADMIN — CEFTRIAXONE SODIUM 1 G: 1 INJECTION, POWDER, FOR SOLUTION INTRAMUSCULAR; INTRAVENOUS at 20:05

## 2021-05-02 RX ADMIN — GUAIFENESIN 600 MG: 600 TABLET, EXTENDED RELEASE ORAL at 20:12

## 2021-05-02 RX ADMIN — METOPROLOL TARTRATE 12.5 MG: 25 TABLET, FILM COATED ORAL at 10:44

## 2021-05-02 RX ADMIN — DEXAMETHASONE 6 MG: 2 TABLET ORAL at 13:35

## 2021-05-02 RX ADMIN — POLYETHYLENE GLYCOL 3350 17 G: 17 POWDER, FOR SOLUTION ORAL at 10:39

## 2021-05-02 RX ADMIN — GUAIFENESIN 600 MG: 600 TABLET, EXTENDED RELEASE ORAL at 10:44

## 2021-05-02 RX ADMIN — REMDESIVIR 100 MG: 100 INJECTION, POWDER, LYOPHILIZED, FOR SOLUTION INTRAVENOUS at 18:10

## 2021-05-02 RX ADMIN — ENOXAPARIN SODIUM 40 MG: 40 INJECTION SUBCUTANEOUS at 20:14

## 2021-05-02 RX ADMIN — IPRATROPIUM BROMIDE AND ALBUTEROL 2 PUFF: 20; 100 SPRAY, METERED RESPIRATORY (INHALATION) at 18:12

## 2021-05-02 ASSESSMENT — ACTIVITIES OF DAILY LIVING (ADL)
ADLS_ACUITY_SCORE: 26

## 2021-05-02 ASSESSMENT — ENCOUNTER SYMPTOMS
VOMITING: 1
SHORTNESS OF BREATH: 1
CHEST TIGHTNESS: 0
COUGH: 1
LIGHT-HEADEDNESS: 0
FLANK PAIN: 0
DIARRHEA: 0
FATIGUE: 1
FEVER: 1
ABDOMINAL PAIN: 0
CONFUSION: 0

## 2021-05-02 ASSESSMENT — MIFFLIN-ST. JEOR: SCORE: 1843.46

## 2021-05-02 NOTE — PROGRESS NOTES
Admission    Patient arrives to room 610 via cart from ED.  Care plan note: A&Ox4, somnolent. Total cares, up with lift. Covid precautions.     Inpatient nursing criteria listed below were met:    PCD's Documented: No  Skin issues/needs documented :Yes  Isolation education started/completed Yes  Patient allergies verified with patient: Yes  Verified completion of [place] Risk Assessment Tool:  Yes  Verified completion of Guardianship screening tool: Yes  Fall Prevention: Care plan updated, Education given and documented Yes  Care Plan initiated: Yes  Home medications documented in belongings flowsheet: NA  Patient belongings documented in belongings flowsheet: Yes  Reminder note (belongings/ medications) placed in discharge instructions:Yes  Admission profile/ required documentation complete: Yes  Bedside Report Letter given and explained to patient Yes  Visitor Designated? NA  If patient is a 72 hour hold/Commitment are belongings removed from room and locked up? NA

## 2021-05-02 NOTE — PROGRESS NOTES
05/02/21 1400   General Information   Onset of Illness/Injury or Date of Surgery 05/01/21   Referring Physician Dr. Blanca   Patient/Family Therapy Goal Statement (SLP) to eat   Pertinent History of Current Problem Mr. Ron Gilmore is a 80-year-old gentleman with complex past medical history of CVA with chronic left-sided weakness, chronic urinary retention with chronic indwelling Cardona catheter, COPD, dyslipidemia, depression and diabetes mellitus type 2, diet controlled, who was brought in for evaluation of fever, shortness of breath and vomiting.  He was found to have COVID 19.  The patient had a recent complicated hospitalization at [facility] from 03/09-03/22/2021 for septic shock secondary to E. coli bacteremia due to an obstructive right-sided UV junction stone with hydronephrosis, status post right-sided percutaneous nephrostomy tube placement by IR followed by percutaneous tube removal and right ureteral stent placement.  He was managed in ICU.  He had been on antibiotics and discharged home on oral Ceftin.  At that time, he also had a transient episode of paroxysmal atrial fibrillation, but then he converted back to sinus rhythm.  Apparently, he also had problems with ongoing dysphagia during the hospital stay.  He was seen by Speech Evaluation and placed on dysphagia diet-1 with honey-thickened liquids.  He was discharged home with Home Care Services.  A vfss was completed on 3/17/2021 which found moderate oropharyngeal dysphagia due to delayed weak swallow response.  1:1 supervision with DD1/honey texture liquids recommendation.   Past History of Dysphagia yes   Type of Evaluation   Type of Evaluation Swallow Evaluation   Oral Motor   Oral Musculature generally intact   Dentition (Oral Motor)   Dentition (Oral Motor) dental appliance/dentures;other (see comments)  (Upper denture moves around, he doesn't have bottoms with him)   Dental Appliance/Denture (Oral Motor) upper;dentures,  full;poor fit   Facial Symmetry (Oral Motor)   Facial Symmetry (Oral Motor) WNL   Lip Function (Oral Motor)   Lip Range of Motion (Oral Motor) WNL   Tongue Function (Oral Motor)   Tongue ROM (Oral Motor) WNL   Tongue Strength (Oral Motor) strength decreased   Tongue Coordination/Speed (Oral Motor) reduced rate   Comment, Tongue Function (Oral Motor) delay in A-P transit   Jaw Function (Oral Motor)   Jaw Function (Oral Motor) WNL   Vocal Quality/Secretion Management (Oral Motor)   Vocal Quality (Oral Motor) WNL   Secretion Management (Oral Motor) WNL   General Swallowing Observations   Current Diet/Method of Nutritional Intake (General Swallowing Observations, NIS) honey-thick liquids;dysphagia level 1 (pureed)   Respiratory Support (General Swallowing Observations) nasal cannula   Comment, General Swallowing Observations vfss completed 3/17/2021   Swallowing Evaluation Clinical swallow evaluation   Clinical Swallow Evaluation   Feeding Assistance no assistance needed   Clinical Swallow Evaluation Textures Trialed Honey-Thick Liquids;Puree Textures   Clinical Swallow Evaluation: Honey-Thick Liquid Texture Trial   Mode of Presentation, Honey spoon;fed by clinician   Volume of Honey Presented several spoonfuls   Oral Phase, Honey other (see comments)  (mild delay A-P movement)   Pharyngeal Phase, Honey intact   Diagnostic Statement Pt demonstrates a mild delay in initiation of swallow on honey texture liquid.  It does not affect swallow safety as he is able to orally manage bolus as it transitions to pharynx.   Clinical Swallow Evaluation: Puree Solid Texture Trial   Mode of Presentation, Puree spoon;fed by clinician   Volume of Puree Presented bites   Oral Phase, Puree other (see comments)  (mild delay in A-P movement of bolus)   Pharyngeal Phase, Puree intact   Diagnostic Statement Pt demonstrates a mild delay in A-P initation of boluses but it does not appear to affect oral bolus management and transition to  pharynx.   Swallowing Recommendations   Diet Consistency Recommendations honey-thick liquids;dysphagia level 1 (pureed)   Supervision Level for Intake patient independent   Mode of Delivery Recommendations bolus size, small;liquids via spoon only;no straws   Monitoring/Assistance Required (Eating/Swallowing) stop eating activities when fatigue is present;monitor for cough or change in vocal quality with intake   Recommended Feeding/Eating Techniques (Swallow Eval) patient is independent, no specific recommendations;maintain upright sitting position for eating;maintain upright posture during/after eating for 30 minutes   Medication Administration Recommendations, Swallowing (SLP) [facility] at a time   Instrumental Assessment Recommendations instrumental evaluation not recommended at this time   Comment, Swallowing Recommendations Pt seen for clinical swallow evaluation.  He has COVID-19.  He was a patient at [facility] for prolonged period in March 2021 during which a vfss was completed on 3/17/2021.  Moderate oropharyngeal dysphagia was diagnosed and DD1/honey texture liquids were recommended.  Pt was positioned upright in bed.  Oral mech demonstrated functional strength and ROM.  He has upper denture which moves when he talks or eats.  He left his bottom denture at home.  He tolerated presentations of honey texture liquids and DD1 trials with delay in the initiation of the oral phase of the swallow.  He was able to orally manage all boluses successfully and pharyngeal phase of swallow on all textures was WFL.  No overt s/s aspiration on any texture during evaluation.  He presented with mild oral weaknesses and it is recommended that continue with DD1/honey texture liquids while sitting upright in bed.  He should remain upright for 30 minutes as reflux precaution.     General Therapy Interventions   Planned Therapy Interventions Dysphagia Treatment   Dysphagia treatment Modified diet education;Instruction of  safe swallow strategies   SLP Therapy Assessment/Plan   Criteria for Skilled Therapeutic Interventions Met (SLP Eval) yes   SLP Diagnosis mild oral phase dysphagia   Rehab Potential (SLP Eval) good, to achieve stated therapy goals   Therapy Frequency (SLP Eval) 5 times/wk   Predicted Duration of Therapy Intervention (SLP Eval) 1 week   Therapy Plan Review/Discharge Plan (SLP)   Therapy Plan Review (SLP) evaluation/treatment results reviewed;care plan/treatment goals reviewed;risks/benefits reviewed;current/potential barriers reviewed;participants voiced agreement with care plan;participants included;patient   SLP Discharge Planning    SLP Discharge Recommendation (DC Rec) home with home care speech therapy   SLP Rationale for DC Rec Pt below baseline for oral intake.  May need services at home d/t COVID 19.   SLP Brief overview of current status  DD1/honey thick liquid    Total Evaluation Time   Total Evaluation Time (Minutes) 13   Coping Strategies   Trust Relationship/Rapport care explained;empathic listening provided;questions answered

## 2021-05-02 NOTE — PROGRESS NOTES
[facility]    Medicine Progress Note - Hospitalist Service       Date of Admission:  5/1/2021  Assessment & Plan       Ron Gilmore is a pleasant 80-year-old gentleman with a complex past medical history cerebrovascular accident with residual left-sided weakness, chronic obstructive pulmonary disease, depression, dysphagia and a recent admission to [facility] in the beginning of 03/2021 for septic shock due to Escherichia coli bacteremia and a right-sided obstructive ureterovesical junction stone with hydronephrosis, status post nephrostomy tube placement and removal and ureteral stent placement, who was brought in for evaluation of fever, shortness of breath and vomiting.  He was found to have COVID-19 infection.     COVID 19   Acute hypoxic respiratory failure  History of COPD  Patient presented to the ED with fever, shortness of breath and vomiting.   PCR positive for COVID 19.  He was hypoxic at 84% on RA.  He was placed on 4 L of oxygen and the oxygen saturation improved to 96%.  CXR showed only some shallow inspiration, no clear infiltrates.  His procalcitonin is less than 0.05 and proBNP is low at 664.  Apparently, he had been on oxygen at home in the past but stated that he had been weaned off oxygen more recently.  He also did receive first immunization shot for COVID-19 a while ago, but unfortunately, he missed his second dose because of his hospitalization at [facility] in 03/2021.    - Titrate O2 as tolerated.  Still on 3 L   - He was given IV Dexamethasone in the ED and this is continud  - IV Remdisivir per protocol   - Lovenox for DVT prophylaxis  - COVID labs   - Combivent inhaler 4 times daily, albuterol inhaler p.r.n. available  - Mucinex BID   - Encouraged IS     Suspected UTI in setting of chronic indwelling Cardona catheter  UA in the ED suggestive of UTI.  Patient had a recent hospitalization for septic shock secondary to E. coli  bacteremia related to an infected obstructive right-sided UVJ stone with hydronephrosis.  He underwent right-sided percutaneous nephrostomy tube by IR that was subsequently removed, and he had a stent placed at that time.  He did follow up with Urology on 04/15 and he had the stent removed.  He was put on Bactrim for 7 days.    - Follow urine cultures  - Continue IV Ceftriaxone     HTN   Blood pressures controlled  - PTA Lopressor 12.5 mg BID     H/o CVA   Residual left-sided hemiparesis  - PTA aspirin, Lipitor  - Will have PT evaluate closer to discharge     H/o Hout  No issues currently   - PTA allopurinol    Depression  - PTA Paxil    Dysphagia  During last hospitalization he was seen by speech and was placed on dysphagia diet-1 with honey-thickened liquids  - Continue dysphagia diet for now   - Speech consulted     Diet controlled DM type II   Last hemoglobin A1c was 6 in 02/2021  - SSI         Diet: Combination Diet Dysphagia Diet Level 1: Pureed; Honey Thickened Liquids (pre-thickened or use instant food thickener)    DVT Prophylaxis: Enoxaparin (Lovenox) SQ  Cardona Catheter: in place, indication:    Code Status: Full Code           Disposition Plan   Expected discharge: 2-5 days, once hypoxia improved.  Will need therapy to evaluate prior to discharge   Entered: Hernando Blanca DO 05/02/2021, 12:29 PM       The patient's care was discussed with the Patient.    Time Spent on this Encounter   Over 35 minutes was spent examining and discussing the patient with greater than 50% time spent in counseling and/or coordination of care.     Hernando Blanca DO  Hospitalist Service  [facility]  Contact information available via [facility] Paging/Directory    ______________________________________________________________________    Interval History   Patient seen and evaluated.  No acute events over night.  Breathing is improving per patient.  No complaints of pain currently.  No further nausea  or vomiting.  No chest pain.    Data reviewed today: I reviewed all medications, new labs and imaging results over the last 24 hours. I personally reviewed no images or EKG's today.    Physical Exam   Vital Signs: Temp: 97.7  F (36.5  C) Temp src: Oral BP: 129/79 Pulse: 77   Resp: 20 SpO2: 97 % O2 Device: Nasal cannula Oxygen Delivery: 3 LPM  Weight: 239 lbs 4.8 oz  General Appearance: Resting comfortably. NAD   Respiratory: No respiratory distress.  Speaking in full sentences  Cardiovascular: Regular rate and rhythm.  Appears well profused  GI: Soft.  Non-distended  Skin: No obvious rashes or cyanosis to exposed skin  Other: Alert.  Answering questions appropriately     Data   Recent Labs   Lab 05/01/21  2152 05/01/21  1558   WBC  --  8.4   HGB  --  10.7*   MCV  --  89   PLT  --  201   NA  --  136   POTASSIUM  --  3.9   CHLORIDE  --  104   CO2  --  28   BUN  --  19   CR  --  0.71   ANIONGAP  --  4   RAJ  --  8.1*   GLC  --  117*   ALBUMIN  --  2.5*   PROTTOTAL  --  7.2   BILITOTAL  --  0.3   ALKPHOS  --  60   ALT  --  24   AST  --  29   TROPI <0.015  --      Recent Results (from the past 24 hour(s))   XR Chest Port 1 View    Narrative    CHEST ONE VIEW PORTABLE   5/1/2021 4:32 PM     HISTORY:  Low O2 saturation. Shortness of breath.    COMPARISON: 3/9/2021.      Impression    IMPRESSION: Shallow inspiration accentuates heart size and pulmonary  vascularity. Lungs clear. No pneumothorax. Previously noted right IJ  central venous catheter has been removed.    BRUNO PAYAN MD

## 2021-05-02 NOTE — PROVIDER NOTIFICATION
Dr. Blanca updated via web-page regarding pt's urine output from day shift being 175 ml.      Addendum 1835  Dr. Blanca updated via web-page about pt being bladderscanned for 0ml.

## 2021-05-02 NOTE — PLAN OF CARE
DATE & TIME: Admission 5/1/21 [id number]  Cognitive Concerns/ Orientation : A&Ox4. Somnolent. Speech garbled; Pt daughter stated that when pt is tired and/or not wearing dentures, speech sounds very garbled/slurred.   BEHAVIOR & AGGRESSION TOOL COLOR: Green   CIWA SCORE: n/a  ABNL VS/O2: VSS on 3L NC.   MOBILITY: Total care, up with lift. Hx stroke, Left hemiparesis. Baseline aleks lift at home and electric scooter.   PAIN MANAGMENT: Denies  DIET: Regular  BOWEL/BLADDER: Chronic chirinos for retention. No BM this shift.    ABNL LAB/BG: CRP 83.2, Hgb 10.7. , 177. Covid positive.   DRAIN/DEVICES: PIV SL  TELEMETRY RHYTHM: n/a  SKIN: Blanchable redness and peeled skin to coccyx, mepilex applied. Abrasion and scab to RLE. +1 BLE edema. Scrotal erythema.   TESTS/PROCEDURES: Chest xray completed.   D/C DAY/GOALS/PLACE: Pending   OTHER IMPORTANT INFO: Baseline R eyelid droop. Pt denied nausea this shift. Special precautions maintained. Spoke with pt daughter on the phone and updated her.

## 2021-05-02 NOTE — PROGRESS NOTES
RECEIVING UNIT ED HANDOFF REVIEW    ED Nurse Handoff Report was reviewed by: Kaitlin Maurice RN on May 1, 2021 at 8:26 PM

## 2021-05-02 NOTE — H&P
"Admitted: 05/01/2021    DATE OF SERVICE: 05/01/2021.    CHIEF COMPLAINT:  Fever, shortness of breath and vomiting.    HISTORY OF PRESENT ILLNESS:  Had been obtained partially from the patient.  Also, he is a poor historian.  I did discuss with the ER attending, Dr. Powers, and I reviewed his chart as well.    Mr. Ron Gilmore is a 80-year-old gentleman with complex past medical history of CVA with chronic left-sided weakness, chronic urinary retention with chronic indwelling Cardona catheter, COPD, dyslipidemia, depression and diabetes mellitus type 2, diet controlled, who was brought in for evaluation of fever, shortness of breath and vomiting.    The patient had a recent complicated hospitalization at [facility] from 03/09-03/22/2021 for septic shock secondary to E. coli bacteremia due to an obstructive right-sided UV junction stone with hydronephrosis, status post right-sided percutaneous nephrostomy tube placement by IR followed by percutaneous tube removal and right ureteral stent placement.  He was managed in ICU.  He had been on antibiotics and discharged home on oral Ceftin.  At that time, he also had a transient episode of paroxysmal atrial fibrillation, but then he converted back to sinus rhythm.  Apparently, he also had problems with ongoing dysphagia during the hospital stay.  He was seen by Speech Evaluation and placed on dysphagia diet-1 with honey-thickened liquids.  He was discharged home with [facility].  He did follow up with Urology on 04/15 and had the stent removed.  He also has his Cardona exchanged.  He was recommended to take Bactrim for 7 days.    The patient states that he started having fever for the last couple of days.  He also reports that today he felt more nauseated and had 3 episodes of emesis.  Apparently, his home RN noted him to be short of breath and advised him to come to ER.  He does have wet breath sounds, and he states that the \"rattling sounds\" just " started today.  He does report having some nonproductive cough.  He denies any headache, he denies chest pain, and he denies shortness of breath.  He is not aware of being in contact with any COVID-positive persons.  Of note, he did receive the first dose of the COVID-19 immunization, but unfortunately, he did not receive his second shot because he was in the hospital around that time.  The patient denies abdominal pain.  He denies diarrhea, constipation.  There is no leg swelling.  He does have chronic Cardona catheter.    In the ER, his vitals showed a blood pressure of 139/81.  He was initially tachycardic, heart rate 108, later on improved to 72.  T max in the ER was 99 degrees Fahrenheit, respiratory rate 22.  He was saturating 84% on room air, improved to 94% to 96% on 4 liters via nasal cannula.  He did have basic blood work, which showed an unremarkable BMP.  His sodium was 136, potassium 3.9, chloride 104, bicarbonate 28, BUN 19, creatinine 0.71.  His calcium is 8.1, anion gap of 4, albumin 2.5, total protein 7.2, total bilirubin 0.3, alkaline phosphatase 60, ALT 24, AST 29.  His proBNP was 664.  Lactic acid 1.6.  Procalcitonin less than 0.05.  Troponin negative.  CRP elevated at 83.2.  Glucose 117.  White blood cells 8.4, hemoglobin 10.7, hematocrit 35.8 and platelet count 201.  His UA is abnormal with white blood cells 62, large leukocyte esterase, trace blood, few bacteria.  He was tested positive for COVID-19 PCR.  He was tested negative for influenza A and B.  His chest x-ray shows shallow inspiration, accentuated heart size, and pulmonary vascularity.  No evidence of infiltrates.    In ER, he was given a bolus of normal saline, 1 dose of Zofran, 1 dose of IV ceftriaxone for suspected UTI and 1 dose of Decadron 6 mg IV, and Hospitalist Service was called regarding admission.    PAST MEDICAL HISTORY:    1.  History of cerebrovascular accident with left-sided weakness.  2.  History of COPD.  3.   Obstructive right-sided ureteral stone with hydronephrosis, status post percutaneous nephrostomy tube placement with subsequent removal and ureteral stent placement that was removed as well.    4.  History of septic shock secondary to E. coli bacteremia.  5.  Paroxysmal A-fib.  6.  Dysphagia.  7.  Depression.    PAST SURGICAL HISTORY:   Past Surgical History:   Procedure Laterality Date     APPENDECTOMY OPEN       ARTHROSCOPY SHOULDER ROTATOR CUFF REPAIR       cataracts Bilateral      CHOLECYSTECTOMY       COLONOSCOPY       CYSTOSCOPY  10/19/2011    Procedure:CYSTOSCOPY; CYSTOSCOPY, BLADDER STONE REMOVAL; Surgeon:ROB SAWYER; Location: OR     CYSTOSCOPY, TRANSURETHRAL RESECTION (TUR) PROSTATE, COMBINED N/A 2/21/2018    Procedure: COMBINED CYSTOSCOPY, TRANSURETHRAL RESECTION (TUR) PROSTATE;  COMBINED CYSTOSCOPY, TRANSURETHRAL RESECTION (TUR) PROSTATE ;  Surgeon: Rob Sawyer MD;  Location:  OR     EP LOOP RECORDER IMPLANT N/A 1/20/2020    Procedure: EP Loop Recorder Implant;  Surgeon: Evgeny Parisi MD;  Location:  HEART CARDIAC CATH LAB     EYE SURGERY      right lid surgery      IR NEPHROSTOMY TUBE PLACEMENT RIGHT  3/9/2021     IR URETERAL STENT PLACEMENT RIGHT  3/16/2021     JOINT REPLACEMENT Right     HIP     KNEE SURGERY Bilateral      LAMINECTOMY LUMBAR ONE LEVEL       LASER HOLMIUM LITHOTRIPSY URETER(S), INSERT STENT, COMBINED Right 4/14/2021    Procedure: CYSTOSCOPY, BLADDER STONE REMOVAL, RIGHT URETEROSCOPY, HOLMIUM LASER LITHOTRIPSY, AND RIGHT STENT REMOVAL, RIGHT RETROGRADE;  Surgeon: Rob Sawyer MD;  Location:  OR     TONSILLECTOMY         SOCIAL HISTORY:  He used to smoke.  He quit smoking many years ago.  He denies alcohol drinking.  He denies illicit drug abuse.    FAMILY HISTORY:   Family History     Problem (# of Occurrences) Relation (Name,Age of Onset)    Prostate Cancer (1) Father          PRIOR TO ADMISSION MEDICATIONS: Needs to be verified by the pharmacist    acetaminophen (TYLENOL) 325 MG tablet Take 650 mg by mouth every 4 hours as needed for mild pain as needed for pain/fever Max dose 3000mg/24hr  at PRN Yes Reported, Patient   allopurinol (ZYLOPRIM) 300 MG tablet Take 300 mg by mouth daily 4/30/2021 at Unknown time Yes Unknown, Entered By History   aspirin (ASA) 325 MG EC tablet Take 1 tablet (325 mg) by mouth daily 4/30/2021 at Unknown time Yes Sandro Maradiaga MD   atorvastatin (LIPITOR) 10 MG tablet Take 10 mg by mouth daily 4/30/2021 at Unknown time Yes Unknown, Entered By History   Emollient (AMLACTIN ULTRA EX) Apply topically daily as needed TO FEET  at PRN Yes Reported, Patient   metoprolol tartrate (LOPRESSOR) 25 MG tablet Take 0.5 tablets (12.5 mg) by mouth 2 times daily 4/30/2021 at Unknown time Yes Romulo Cavanaugh MD   PARoxetine (PAXIL) 20 MG tablet Take 20 mg by mouth daily 4/30/2021 at Unknown time Yes Unknown, Entered By History   polyethylene glycol (MIRALAX) 17 GM/Dose powder Take 17 g by mouth daily 4/30/2021 at Unknown time Yes Romulo Cavanaugh MD          ALLERGIES:  NO KNOWN DRUG ALLERGIES.    REVIEW OF SYSTEMS:  A 10-point review of systems was conducted and was negative, except for pertinent positives mentioned in history of present illness.    PHYSICAL EXAMINATION:    VITAL SIGNS:  Blood pressure is 128/75, heart rate 100, respiratory rate 16, oxygen saturation 96% on 4 liters via nasal cannula, temperature in the ER 99 degrees Fahrenheit.  GENERAL:  The patient is awake, alert, no acute distress at the time of my examination.  HEENT:  Head is normocephalic, atraumatic.  Pupils are equally round and reactive to light.  Oral mucosa is moist.  NECK:  Supple.  No cervical lymphadenopathy, no thyromegaly.  CHEST: There is bilateral air entry with coarse breath sounds bilateral and wet breath sounds. No wheezing.  CARDIOVASCULAR:  There is normal S1 and S2, regular rate.  There are no murmurs, no rubs.  ABDOMEN:  Soft, obese, nontender,  nondistended.  Bowel sounds are present.  EXTREMITIES:  There is no leg swelling, no calf tenderness. 2+ peripheral pulses are palpable.    SKIN:  Intact.  No rash.  No cyanosis.  NEUROLOGIC:  The patient is awake, alert, oriented to self, place and time.  He has residual left-sided weakness.  No new focal neurological deficits.    PSYCHIATRIC:  Normal mood, normal affect.  MUSCULOSKELETAL:  He does not have any obvious joint deformities.    LABORATORY DATA:  Reviewed and dictated above.    IMPRESSION:  Mr. Ron Gilmore is a pleasant 80-year-old gentleman with a complex past medical history cerebrovascular accident with residual left-sided weakness, chronic obstructive pulmonary disease, depression, dysphagia and a recent admission to [facility] in the beginning of 03/2021 for septic shock due to Escherichia coli bacteremia and a right-sided obstructive ureterovesical junction stone with hydronephrosis, status post nephrostomy tube placement and removal and ureteral stent placement, who was brought in for evaluation of fever, shortness of breath and vomiting.  He was found to have COVID-19 infection.    PLAN:     1.  Acute hypoxic respiratory failure.  2.  COVID-19 infection.  3.  History of COPD.   -- Apparently, he had been on oxygen at home, but he states that he had been weaned off oxygen more recently.  He apparently did receive first immunization shot for COVID-19 a while ago, but unfortunately, he missed his second dose because of his hospitalization at [facility] in 03/2021.  Apparently, he started having some fevers for the last couple of days and his Home Care nurse was worried about his breathing status.  He does have a rattling, wet sound in his chest.  He was hypoxic, 84% on room air.  Upon arrival in ER, he was mildly tachycardic.  T max in the ER was 99 degrees Fahrenheit.  He was placed on 4 L of oxygen and the oxygen saturation improved to 96%.  His chest x-ray  showed only some shallow inspiration, no clear infiltrates.  His procalcitonin is less than 0.05 and proBNP is low at 664.  He was given 1 dose of dexamethasone 6 mg IV in the ER.  The patient is admitted to a medical floor.  We will continue with IV Decadron for now.  We will start him on remdesivir.  We will provide supplemental oxygen.  We will start him on Combivent inhaler 4 times daily, albuterol inhaler p.r.n. available.  We will start him on Mucinex b.i.d.  Incentive spirometry ordered. Also started on Lovenox for DVT prophylaxis.  We will follow up inflammatory markers.  We will try to wean him off oxygen as able.    4.  Possible UTI.  5.  Chronic indwelling Cardona catheter.  -- He had a recent hospitalization for septic shock secondary to E. coli bacteremia related to an infected obstructive right-sided UVJ stone with hydronephrosis.  He underwent right-sided percutaneous nephrostomy tube by IR that was subsequently removed, and he had a stent placed at that time.  He did follow up with Urology on 04/15.  He had the stent removed.  He was put on Bactrim for 7 days.  His UA is abnormal at this time with white blood cells of 62, large leukocyte esterase.  He was given 1 dose of ceftriaxone in ER.  We will continue with IV ceftriaxone at this time and follow up urine cultures and adjust antibiotics as needed.     6.  History of cerebrovascular accident with residual left-sided hemiparesis:  We will continue his prior to admission aspirin, Lipitor.  Eventually, he will need to be seen by PT Evaluation.    7.  History of gout: We will resume his prior to admission allopurinol.    8.  Hypertension: His blood pressure is reasonably controlled.  We will resume his prior to admission Lopressor 12.5 mg p.o. twice daily.    9.  History of depression: We will resume his prior to admission Paxil.    10.  Dysphagia: Last time he was seen by Speech Evaluation and was placed on dysphagia diet-1 with honey-thickened  liquids, which will be ordered at this time.    11.  History of diabetes mellitus type 2, diet controlled: Last hemoglobin A1c was 6 in 2021.  Insulin sliding scale had been ordered at this time.    12.  Deep venous thrombosis prophylaxis:  He had been started on Lovenox.  13.  Code status was discussed with the patient.  The patient wishes to be FULL CODE at this time.  I do note that he had been DNR/DNI during his most recent hospitalization.  14.  Disposition:  Admit to inpatient.  I anticipate a couple of days of hospitalization.    Tiffani Samson MD        D: 2021   T: 2021   MT: ANTONINA    Name:     SHERI THORPE  MRN:      [id number]        Account:     [id number]   :      1940           Admitted:    2021       Document: [id number]

## 2021-05-02 NOTE — PHARMACY-ADMISSION MEDICATION HISTORY
Pharmacy Medication History  Admission medication history interview status for the 5/1/2021  admission is complete. See EPIC admission navigator for prior to admission medications     Location of Interview: Phone  Medication history sources: Patient's family/friend (Daughter López) and [employer]    Significant changes made to the medication list:  None    In the past week, patient estimated taking medication this percent of the time: greater than 90%    Additional medication history information:   N/A    Medication reconciliation completed by provider prior to medication history? No    Time spent in this activity: 20 minutes    Prior to Admission medications    Medication Sig Last Dose Taking? Auth Provider   acetaminophen (TYLENOL) 325 MG tablet Take 650 mg by mouth every 4 hours as needed for mild pain as needed for pain/fever Max dose 3000mg/24hr  at PRN Yes Reported, Patient   allopurinol (ZYLOPRIM) 300 MG tablet Take 300 mg by mouth daily 4/30/2021 at Unknown time Yes Unknown, Entered By History   aspirin (ASA) 325 MG EC tablet Take 1 tablet (325 mg) by mouth daily 4/30/2021 at Unknown time Yes Sandro Maradiaga MD   atorvastatin (LIPITOR) 10 MG tablet Take 10 mg by mouth daily 4/30/2021 at Unknown time Yes Unknown, Entered By History   Emollient (AMLACTIN ULTRA EX) Apply topically daily as needed TO FEET  at PRN Yes Reported, Patient   metoprolol tartrate (LOPRESSOR) 25 MG tablet Take 0.5 tablets (12.5 mg) by mouth 2 times daily 4/30/2021 at Unknown time Yes Romulo Cavanaugh MD   PARoxetine (PAXIL) 20 MG tablet Take 20 mg by mouth daily 4/30/2021 at Unknown time Yes Unknown, Entered By History   polyethylene glycol (MIRALAX) 17 GM/Dose powder Take 17 g by mouth daily 4/30/2021 at Unknown time Yes Romulo Cavanaugh MD       The information provided in this note is only as accurate as the sources available at the time of update(s)

## 2021-05-03 ENCOUNTER — APPOINTMENT (OUTPATIENT)
Dept: SPEECH THERAPY | Facility: CLINIC | Age: 81
DRG: 177 | End: 2021-05-03
Attending: INTERNAL MEDICINE
Payer: COMMERCIAL

## 2021-05-03 LAB
ANION GAP SERPL CALCULATED.3IONS-SCNC: 2 MMOL/L (ref 3–14)
BUN SERPL-MCNC: 18 MG/DL (ref 7–30)
CALCIUM SERPL-MCNC: 8.5 MG/DL (ref 8.5–10.1)
CHLORIDE SERPL-SCNC: 104 MMOL/L (ref 94–109)
CO2 SERPL-SCNC: 31 MMOL/L (ref 20–32)
CREAT SERPL-MCNC: 0.6 MG/DL (ref 0.66–1.25)
CRP SERPL-MCNC: 41 MG/L (ref 0–8)
D DIMER PPP FEU-MCNC: 0.9 UG/ML FEU (ref 0–0.5)
ERYTHROCYTE [DISTWIDTH] IN BLOOD BY AUTOMATED COUNT: 16.1 % (ref 10–15)
FIBRINOGEN PPP-MCNC: 403 MG/DL (ref 200–420)
GFR SERPL CREATININE-BSD FRML MDRD: >90 ML/MIN/{1.73_M2}
GLUCOSE BLDC GLUCOMTR-MCNC: 118 MG/DL (ref 70–99)
GLUCOSE BLDC GLUCOMTR-MCNC: 169 MG/DL (ref 70–99)
GLUCOSE BLDC GLUCOMTR-MCNC: 179 MG/DL (ref 70–99)
GLUCOSE SERPL-MCNC: 120 MG/DL (ref 70–99)
HCT VFR BLD AUTO: 33.8 % (ref 40–53)
HGB BLD-MCNC: 9.9 G/DL (ref 13.3–17.7)
MCH RBC QN AUTO: 26.8 PG (ref 26.5–33)
MCHC RBC AUTO-ENTMCNC: 29.3 G/DL (ref 31.5–36.5)
MCV RBC AUTO: 91 FL (ref 78–100)
PLATELET # BLD AUTO: 180 10E9/L (ref 150–450)
POTASSIUM SERPL-SCNC: 4.9 MMOL/L (ref 3.4–5.3)
RBC # BLD AUTO: 3.7 10E12/L (ref 4.4–5.9)
SODIUM SERPL-SCNC: 137 MMOL/L (ref 133–144)
WBC # BLD AUTO: 6.8 10E9/L (ref 4–11)

## 2021-05-03 PROCEDURE — 999N001017 HC STATISTIC GLUCOSE BY METER IP

## 2021-05-03 PROCEDURE — 99233 SBSQ HOSP IP/OBS HIGH 50: CPT | Performed by: INTERNAL MEDICINE

## 2021-05-03 PROCEDURE — 250N000011 HC RX IP 250 OP 636: Performed by: INTERNAL MEDICINE

## 2021-05-03 PROCEDURE — 36415 COLL VENOUS BLD VENIPUNCTURE: CPT | Performed by: INTERNAL MEDICINE

## 2021-05-03 PROCEDURE — 250N000009 HC RX 250: Performed by: INTERNAL MEDICINE

## 2021-05-03 PROCEDURE — 258N000003 HC RX IP 258 OP 636: Performed by: INTERNAL MEDICINE

## 2021-05-03 PROCEDURE — 86140 C-REACTIVE PROTEIN: CPT | Performed by: INTERNAL MEDICINE

## 2021-05-03 PROCEDURE — 250N000013 HC RX MED GY IP 250 OP 250 PS 637: Performed by: INTERNAL MEDICINE

## 2021-05-03 PROCEDURE — 92526 ORAL FUNCTION THERAPY: CPT | Mod: GN | Performed by: SPEECH-LANGUAGE PATHOLOGIST

## 2021-05-03 PROCEDURE — 85027 COMPLETE CBC AUTOMATED: CPT | Performed by: INTERNAL MEDICINE

## 2021-05-03 PROCEDURE — 85384 FIBRINOGEN ACTIVITY: CPT | Performed by: INTERNAL MEDICINE

## 2021-05-03 PROCEDURE — 80048 BASIC METABOLIC PNL TOTAL CA: CPT | Performed by: INTERNAL MEDICINE

## 2021-05-03 PROCEDURE — 120N000001 HC R&B MED SURG/OB

## 2021-05-03 PROCEDURE — 250N000012 HC RX MED GY IP 250 OP 636 PS 637: Performed by: INTERNAL MEDICINE

## 2021-05-03 PROCEDURE — 85379 FIBRIN DEGRADATION QUANT: CPT | Performed by: INTERNAL MEDICINE

## 2021-05-03 RX ADMIN — ATORVASTATIN CALCIUM 10 MG: 10 TABLET, FILM COATED ORAL at 08:46

## 2021-05-03 RX ADMIN — MICONAZOLE NITRATE: 20 POWDER TOPICAL at 22:07

## 2021-05-03 RX ADMIN — ALLOPURINOL 300 MG: 300 TABLET ORAL at 08:46

## 2021-05-03 RX ADMIN — IPRATROPIUM BROMIDE AND ALBUTEROL 2 PUFF: 20; 100 SPRAY, METERED RESPIRATORY (INHALATION) at 22:11

## 2021-05-03 RX ADMIN — METOPROLOL TARTRATE 12.5 MG: 25 TABLET, FILM COATED ORAL at 22:06

## 2021-05-03 RX ADMIN — CEFTRIAXONE SODIUM 1 G: 1 INJECTION, POWDER, FOR SOLUTION INTRAMUSCULAR; INTRAVENOUS at 19:27

## 2021-05-03 RX ADMIN — ASPIRIN 325 MG: 325 TABLET, COATED ORAL at 08:46

## 2021-05-03 RX ADMIN — SODIUM CHLORIDE 50 ML: 900 INJECTION INTRAVENOUS at 19:28

## 2021-05-03 RX ADMIN — ENOXAPARIN SODIUM 40 MG: 40 INJECTION SUBCUTANEOUS at 19:28

## 2021-05-03 RX ADMIN — GUAIFENESIN 600 MG: 600 TABLET, EXTENDED RELEASE ORAL at 08:46

## 2021-05-03 RX ADMIN — INSULIN ASPART 1 UNITS: 100 INJECTION, SOLUTION INTRAVENOUS; SUBCUTANEOUS at 14:01

## 2021-05-03 RX ADMIN — DEXAMETHASONE 6 MG: 2 TABLET ORAL at 14:00

## 2021-05-03 RX ADMIN — IPRATROPIUM BROMIDE AND ALBUTEROL 2 PUFF: 20; 100 SPRAY, METERED RESPIRATORY (INHALATION) at 08:53

## 2021-05-03 RX ADMIN — GUAIFENESIN 600 MG: 600 TABLET, EXTENDED RELEASE ORAL at 22:06

## 2021-05-03 RX ADMIN — METOPROLOL TARTRATE 12.5 MG: 25 TABLET, FILM COATED ORAL at 08:46

## 2021-05-03 RX ADMIN — POLYETHYLENE GLYCOL 3350 17 G: 17 POWDER, FOR SOLUTION ORAL at 08:52

## 2021-05-03 RX ADMIN — PAROXETINE HYDROCHLORIDE 20 MG: 20 TABLET, FILM COATED ORAL at 08:46

## 2021-05-03 RX ADMIN — REMDESIVIR 100 MG: 100 INJECTION, POWDER, LYOPHILIZED, FOR SOLUTION INTRAVENOUS at 16:58

## 2021-05-03 ASSESSMENT — ACTIVITIES OF DAILY LIVING (ADL)
ADLS_ACUITY_SCORE: 26

## 2021-05-03 ASSESSMENT — MIFFLIN-ST. JEOR: SCORE: 1847

## 2021-05-03 NOTE — PROGRESS NOTES
[facility]    Hospitalist Progress Note    Interval History   - Down to 1L O2. Endorses a worse cough today. Still remains tired. Reports he uses a Terrie lift at home    Assessment & Plan   Summary: Ron Gilmore is a 80 year old male with complex past medical history cerebrovascular accident with residual left-sided weakness, chronic obstructive pulmonary disease, depression, dysphagia and a recent admission to [facility] in the beginning of 03/2021 for septic shock due to Escherichia coli bacteremia and a right-sided obstructive ureterovesical junction stone with hydronephrosis, status post nephrostomy tube placement and removal and ureteral stent placement, who was admitted on 5/1/2021 with COVID-19.     Severe COVID 19  Acute hypoxic respiratory failure  History of COPD  Patient presented to the ED with fever, shortness of breath and vomiting.  PCR positive for COVID 19.  He was hypoxic at 84% on RA.  He was placed on 4 L of oxygen and the oxygen saturation improved to 96%.  CXR showed only some shallow inspiration, no clear infiltrates.  His procalcitonin is less than 0.05 and proBNP is low at 664.   Apparently, he had been on oxygen at home in the past but stated that he had been weaned off oxygen more recently.  He also did receive first immunization shot for COVID-19 a while ago, but unfortunately, he missed his second dose because of his hospitalization at [facility] in 03/2021.  - Titrate O2 as tolerated. Down to 1L on 5/3/2021  - Continue oral dexamethasone  - IV Remdisivir per protocol  - Lovenox 40mg daily for DVT prophylaxis  - COVID labs   - Combivent inhaler 4 times daily, albuterol inhaler p.r.n. available  - Mucinex BID   - Encouraged IS     Suspected UTI in setting of chronic indwelling Cardona catheter  UA in the ED suggestive of UTI.  Patient had a recent hospitalization for septic shock secondary to E. coli bacteremia related to an  infected obstructive right-sided UVJ stone with hydronephrosis.  He underwent right-sided percutaneous nephrostomy tube by IR that was subsequently removed, and he had a stent placed at that time.  He did follow up with Urology on 04/15 and he had the stent removed.  He was put on Bactrim for 7 days.    - Follow urine cultures--still pending  - Continue IV Ceftriaxone     HTN   Blood pressures controlled  - PTA Lopressor 12.5 mg BID     H/o CVA   Residual left-sided hemiparesis  - PTA aspirin, Lipitor  - Will have PT evaluate closer to discharge     H/o Hout  No issues currently   - PTA allopurinol    Depression  - PTA Paxil    Dysphagia  During last hospitalization he was seen by speech and was placed on dysphagia diet-1 with honey-thickened liquids  - Continue dysphagia diet for now   - Speech consulted     Prediabetes. Patient has not had any known A1c > 6.5%, and patient himself denies hx of DM2.    DVT Prophylaxis: Lovenox  Code Status: Full Code  PT/OT: ordered  Diet: Snacks/Supplements Adult: Boost Shake; Between Meals  Combination Diet Dysphagia Diet Level 1: Pureed; Nectar Thickened Liquids (pre-thickened or use instant food thickener)      Disposition: Expected discharge 1-2 days, try and wean off O2 first    Agustin Siu MD  Text Page  (7am to 6pm)  -Data reviewed today: I reviewed all new labs and imaging results over the last 24 hours.    Physical Exam   Temp: 97.6  F (36.4  C) Temp src: Oral BP: 109/67 Pulse: 81   Resp: 18 SpO2: 90 % O2 Device: Nasal cannula Oxygen Delivery: 1 LPM  Vitals:    05/01/21 2118 05/02/21 0644 05/03/21 0636   Weight: 106.9 kg (235 lb 10.8 oz) 108.5 kg (239 lb 4.8 oz) 108.9 kg (240 lb 1.3 oz)     Vital Signs with Ranges  Temp:  [97.6  F (36.4  C)-98  F (36.7  C)] 97.6  F (36.4  C)  Pulse:  [62-88] 81  Resp:  [18] 18  BP: (104-150)/(55-81) 109/67  SpO2:  [90 %-95 %] 90 %  I/O last 3 completed shifts:  In: 860 [P.O.:860]  Out: 700 [Urine:700]  O2 requirements:  1L    Constitutional: morbidly obese male in NAD  HEENT: Eyes nonicteric, oral mucosa moist  Cardiovascular: RRR, normal S1/2, no m/r/g  Respiratory: CTAB, no wheezing or crackles  Vascular: No LE pitting edema  GI: Normoactive bowel sounds, nontender  Skin/Integumen: No rashes  Neuro/Psych: Appropriate affect and mood. A&Ox3, L weakness    Medications     - MEDICATION INSTRUCTIONS -       - MEDICATION INSTRUCTIONS -         remdesivir  100 mg Intravenous Q24H    And     sodium chloride 0.9%  50 mL Intravenous Q24H     allopurinol  300 mg Oral Daily     aspirin  325 mg Oral Daily     atorvastatin  10 mg Oral Daily     cefTRIAXone  1 g Intravenous Q24H     dexamethasone  6 mg Oral Daily     enoxaparin ANTICOAGULANT  40 mg Subcutaneous QPM     guaiFENesin  600 mg Oral BID     ipratropium-albuterol  2 puff Inhalation 4x Daily     metoprolol tartrate  12.5 mg Oral BID     PARoxetine  20 mg Oral Daily     polyethylene glycol  17 g Oral Daily     sodium chloride (PF)  3 mL Intracatheter Q8H       Data   Recent Labs   Lab 05/03/21  0810 05/02/21  1154 05/01/21  2152 05/01/21  1558   WBC 6.8 5.6  --  8.4   HGB 9.9* 10.9*  --  10.7*   MCV 91 92  --  89    190  --  201    136  --  136   POTASSIUM 4.9 4.8  --  3.9   CHLORIDE 104 104  --  104   CO2 31 34*  --  28   BUN 18 18  --  19   CR 0.60* 0.70  --  0.71   ANIONGAP 2* <1*  --  4   RAJ 8.5 8.8  --  8.1*   * 139*  --  117*   ALBUMIN  --   --   --  2.5*   PROTTOTAL  --   --   --  7.2   BILITOTAL  --   --   --  0.3   ALKPHOS  --   --   --  60   ALT  --   --   --  24   AST  --   --   --  29   TROPI  --   --  <0.015  --        Imaging:   No results found for this or any previous visit (from the past 24 hour(s)).

## 2021-05-03 NOTE — PLAN OF CARE
DATE & TIME: Admission 5/2/21 9928-6470  Cognitive Concerns/ Orientation : A&Ox4. More awake today.  Cracking jokes.  Speech slightly garbled.  BEHAVIOR & AGGRESSION TOOL COLOR: Green   CIWA SCORE: n/a  ABNL VS/O2: VSS on 2L NC.   MOBILITY: Total care, up with lift. Hx stroke, Left hemiparesis. Baseline aleks lift at home and electric scooter.   PAIN MANAGMENT: Denies  DIET: Regular.  Ensure ordered per pt request.    BOWEL/BLADDER: Chronic chirinos for retention. Urine output-175 ml and bladderscanned for 0 (Hospitalist notified).  No BM this shift.    ABNL LAB/BG: CRP 82.9, Hgb 10.9,  , 139 & 127. Covid positive.   DRAIN/DEVICES: PIV SL  TELEMETRY RHYTHM: n/a  SKIN: Blanchable redness and peeled skin to coccyx, mepilex applied. Abrasion and scab to RLE. +1 BLE edema. Scrotal erythema. Barrier powder on.  TESTS/PROCEDURES: Chest xray completed.   D/C DAY/GOALS/PLACE: Pending   OTHER IMPORTANT INFO: Baseline R eyelid droop. Pt denied pain/nausea/vomiting this shift. Special precautions maintained. On IV Remdesivir, Rocephin, PO Decadron.

## 2021-05-03 NOTE — PLAN OF CARE
DATE & TIME: 5/2/21 1900-0730  Cognitive Concerns/ Orientation : A&Ox4. Calm and cooperative. Speech garbled; Pt daughter stated that when pt is tired and/or not wearing dentures, speech sounds very garbled/slurred.   BEHAVIOR & AGGRESSION TOOL COLOR: Green   CIWA SCORE: n/a  ABNL VS/O2: VSS on 1L NC, sats 92-95%. O2 sats 88-89% on RA.   MOBILITY: Total care, up with lift. Hx stroke, Left hemiparesis. Baseline aleks lift at home and electric scooter.   PAIN MANAGMENT: Denies  DIET: Regular. Tray set up for meals, pt can feed self with R hand.   BOWEL/BLADDER: Chronic chirinos for retention, low urine output, MD aware. No BM this shift.    ABNL LAB/BG: D-dimer 1.1, CRP 82.9, Fibrinogen 610. Hgb 10.9. , 179. Covid positive.   DRAIN/DEVICES: PIV SL  TELEMETRY RHYTHM: n/a  SKIN: Blanchable redness to coccyx, mepilex CDI. Abrasion and scab to RLE. +1 BLE edema. Scrotal erythema.   TESTS/PROCEDURES: Chest xray completed.   D/C DAY/GOALS/PLACE: 2-5 days once hypoxia has improved per MD note.   OTHER IMPORTANT INFO: Baseline R eyelid droop. Speech following. Denies nausea. Special precautions maintained.

## 2021-05-03 NOTE — PROGRESS NOTES
[facility]  Patient is currently open to home care services with [facility]. The patient is currently receiving RN services.  and home health team have been notified of patient admission. [facility] liaison will continue to follow patient during stay. If appropriate provide orders to resume home care at time of discharge.    Colleen Perrin RN   [facility] Home Care Liaison   [phone]

## 2021-05-03 NOTE — PLAN OF CARE
"DATE & TIME: 5/3/21 7-3 pm  Cognitive Concerns/ Orientation : A & O x3. Forgetful. Pleasant.   BEHAVIOR & AGGRESSION TOOL COLOR: Green   CIWA SCORE: N/A  ABNL VS/O2: VSS on 1L NC, sats 92-95%. O2 sats 88-89% on RA.   MOBILITY: Total care, up with lift. Hx stroke, Left hemiparesis. Baseline aleks lift at home and electric scooter. Refusing to get in recliner. Turning and repositioning in bed all day.   PAIN MANAGMENT: Denies  DIET: DD1 was on honey thick just got advanced to [place]. Tray set up/some assistance with for meals, pt can drink with R hand. Good oral liquid intake.   BOWEL/BLADDER: Chronic chirinos for retention. No BM.   ABNL LAB/BG: Cr 0.60, CRP 41, and Hgb 9.9. /169.   DRAIN/DEVICES: PIV SL  TELEMETRY RHYTHM: N/A  SKIN: Pale. Non-Blanchable redness/discoloration to coccyx, mepilex changed CDI. Abrasion and scab to RLE. Generalized +1 edema. [place] area Scrotal erythema, cleansed and powdered.   TESTS/PROCEDURES: None scheduled.   D/C DAY/GOALS/PLACE: Per MD note \"2-5 days, once hypoxia improved.  Will need therapy to evaluate prior to discharge\".    OTHER IMPORTANT INFO: Baseline R eyelid droop. Speech following. LS coarse, infrequent congested cough. Special precautions maintained.   "

## 2021-05-04 ENCOUNTER — APPOINTMENT (OUTPATIENT)
Dept: SPEECH THERAPY | Facility: CLINIC | Age: 81
DRG: 177 | End: 2021-05-04
Payer: COMMERCIAL

## 2021-05-04 VITALS
SYSTOLIC BLOOD PRESSURE: 113 MMHG | TEMPERATURE: 98.5 F | DIASTOLIC BLOOD PRESSURE: 61 MMHG | HEIGHT: 72 IN | OXYGEN SATURATION: 92 % | RESPIRATION RATE: 18 BRPM | HEART RATE: 65 BPM | WEIGHT: 240.08 LBS | BODY MASS INDEX: 32.52 KG/M2

## 2021-05-04 LAB
ANION GAP SERPL CALCULATED.3IONS-SCNC: <1 MMOL/L (ref 3–14)
BACTERIA SPEC CULT: ABNORMAL
BACTERIA SPEC CULT: ABNORMAL
BUN SERPL-MCNC: 22 MG/DL (ref 7–30)
CALCIUM SERPL-MCNC: 8.9 MG/DL (ref 8.5–10.1)
CHLORIDE SERPL-SCNC: 104 MMOL/L (ref 94–109)
CO2 SERPL-SCNC: 35 MMOL/L (ref 20–32)
CREAT SERPL-MCNC: 0.62 MG/DL (ref 0.66–1.25)
CRP SERPL-MCNC: 20.3 MG/L (ref 0–8)
D DIMER PPP FEU-MCNC: 0.6 UG/ML FEU (ref 0–0.5)
ERYTHROCYTE [DISTWIDTH] IN BLOOD BY AUTOMATED COUNT: 16 % (ref 10–15)
FIBRINOGEN PPP-MCNC: 394 MG/DL (ref 200–420)
GFR SERPL CREATININE-BSD FRML MDRD: >90 ML/MIN/{1.73_M2}
GLUCOSE SERPL-MCNC: 136 MG/DL (ref 70–99)
HCT VFR BLD AUTO: 35 % (ref 40–53)
HGB BLD-MCNC: 10.2 G/DL (ref 13.3–17.7)
Lab: ABNORMAL
MCH RBC QN AUTO: 26.1 PG (ref 26.5–33)
MCHC RBC AUTO-ENTMCNC: 29.1 G/DL (ref 31.5–36.5)
MCV RBC AUTO: 90 FL (ref 78–100)
PLATELET # BLD AUTO: 158 10E9/L (ref 150–450)
POTASSIUM SERPL-SCNC: 5.3 MMOL/L (ref 3.4–5.3)
RBC # BLD AUTO: 3.91 10E12/L (ref 4.4–5.9)
SODIUM SERPL-SCNC: 137 MMOL/L (ref 133–144)
SPECIMEN SOURCE: ABNORMAL
WBC # BLD AUTO: 7 10E9/L (ref 4–11)

## 2021-05-04 PROCEDURE — 85027 COMPLETE CBC AUTOMATED: CPT | Performed by: INTERNAL MEDICINE

## 2021-05-04 PROCEDURE — 85384 FIBRINOGEN ACTIVITY: CPT | Performed by: INTERNAL MEDICINE

## 2021-05-04 PROCEDURE — 36415 COLL VENOUS BLD VENIPUNCTURE: CPT | Performed by: INTERNAL MEDICINE

## 2021-05-04 PROCEDURE — 250N000009 HC RX 250: Performed by: INTERNAL MEDICINE

## 2021-05-04 PROCEDURE — 250N000011 HC RX IP 250 OP 636: Performed by: INTERNAL MEDICINE

## 2021-05-04 PROCEDURE — 99239 HOSP IP/OBS DSCHRG MGMT >30: CPT | Performed by: INTERNAL MEDICINE

## 2021-05-04 PROCEDURE — 80048 BASIC METABOLIC PNL TOTAL CA: CPT | Performed by: INTERNAL MEDICINE

## 2021-05-04 PROCEDURE — 250N000013 HC RX MED GY IP 250 OP 250 PS 637: Performed by: INTERNAL MEDICINE

## 2021-05-04 PROCEDURE — 250N000012 HC RX MED GY IP 250 OP 636 PS 637: Performed by: INTERNAL MEDICINE

## 2021-05-04 PROCEDURE — 85379 FIBRIN DEGRADATION QUANT: CPT | Performed by: INTERNAL MEDICINE

## 2021-05-04 PROCEDURE — 92526 ORAL FUNCTION THERAPY: CPT | Mod: GN | Performed by: SPEECH-LANGUAGE PATHOLOGIST

## 2021-05-04 PROCEDURE — 258N000003 HC RX IP 258 OP 636: Performed by: INTERNAL MEDICINE

## 2021-05-04 PROCEDURE — 86140 C-REACTIVE PROTEIN: CPT | Performed by: INTERNAL MEDICINE

## 2021-05-04 RX ORDER — CIPROFLOXACIN 500 MG/1
500 TABLET, FILM COATED ORAL 2 TIMES DAILY
Qty: 8 TABLET | Refills: 0 | Status: SHIPPED | OUTPATIENT
Start: 2021-05-04 | End: 2021-05-06

## 2021-05-04 RX ADMIN — ALLOPURINOL 300 MG: 300 TABLET ORAL at 09:00

## 2021-05-04 RX ADMIN — IPRATROPIUM BROMIDE AND ALBUTEROL 2 PUFF: 20; 100 SPRAY, METERED RESPIRATORY (INHALATION) at 17:15

## 2021-05-04 RX ADMIN — IPRATROPIUM BROMIDE AND ALBUTEROL 2 PUFF: 20; 100 SPRAY, METERED RESPIRATORY (INHALATION) at 12:14

## 2021-05-04 RX ADMIN — PAROXETINE HYDROCHLORIDE 20 MG: 20 TABLET, FILM COATED ORAL at 09:00

## 2021-05-04 RX ADMIN — MICONAZOLE NITRATE: 20 POWDER TOPICAL at 08:51

## 2021-05-04 RX ADMIN — IPRATROPIUM BROMIDE AND ALBUTEROL 2 PUFF: 20; 100 SPRAY, METERED RESPIRATORY (INHALATION) at 08:40

## 2021-05-04 RX ADMIN — CEFTRIAXONE SODIUM 1 G: 1 INJECTION, POWDER, FOR SOLUTION INTRAMUSCULAR; INTRAVENOUS at 17:18

## 2021-05-04 RX ADMIN — ATORVASTATIN CALCIUM 10 MG: 10 TABLET, FILM COATED ORAL at 09:00

## 2021-05-04 RX ADMIN — METOPROLOL TARTRATE 12.5 MG: 25 TABLET, FILM COATED ORAL at 09:00

## 2021-05-04 RX ADMIN — SODIUM CHLORIDE 50 ML: 900 INJECTION INTRAVENOUS at 17:13

## 2021-05-04 RX ADMIN — POLYETHYLENE GLYCOL 3350 17 G: 17 POWDER, FOR SOLUTION ORAL at 08:55

## 2021-05-04 RX ADMIN — ASPIRIN 325 MG: 325 TABLET, COATED ORAL at 09:01

## 2021-05-04 RX ADMIN — REMDESIVIR 100 MG: 100 INJECTION, POWDER, LYOPHILIZED, FOR SOLUTION INTRAVENOUS at 16:06

## 2021-05-04 RX ADMIN — GUAIFENESIN 600 MG: 600 TABLET, EXTENDED RELEASE ORAL at 09:01

## 2021-05-04 RX ADMIN — DEXAMETHASONE 6 MG: 2 TABLET ORAL at 12:13

## 2021-05-04 ASSESSMENT — ACTIVITIES OF DAILY LIVING (ADL)
ADLS_ACUITY_SCORE: 26

## 2021-05-04 NOTE — PLAN OF CARE
"DATE & TIME: 5/3/21 3-11p  Cognitive Concerns/ Orientation :O/A x3 forgetful  BEHAVIOR & AGGRESSION TOOL COLOR: Green, calm//cooperative  CIWA SCORE: n/a  ABNL VS/O2: VSS on 1L NC, Sats 92-95%. De-sats to 88-89% on RA.   MOBILITY: Total care, up with lift. Hx stroke, Left hemiparesis. Baseline aleks lift at home and electric scooter, up for supper in chair  PAIN MANAGMENT: Denies  DIET: DD1 was on honey thick just got advanced to [place]. Tray set up/some assistance with for meals, pt can drink with R hand, meds whole with pudding  BOWEL/BLADDER: Chronic chirinos for retention, no BM tonight  ABNL LAB/BG: Cr 0.60, CRP 41, and Hgb 9.9. BGM d/c'd  DRAIN/DEVICES: PIV SL  TELEMETRY RHYTHM: N/A  SKIN: Pale. Non-Blanchable redness/discoloration to coccyx, mepilex changed CDI. Abrasion and scab to RLE. Generalized +1 edema. Scrotal erythema, cleansed and Micatin applied  TESTS/PROCEDURES: None scheduled.   D/C DAY/GOALS/PLACE: Per MD note \"2-5 days, once hypoxia improved  OTHER IMPORTANT INFO: Baseline R eyelid droop. Speech following. LS coarse, infrequent congested cough. Continues on Dex/Remdesivir/Rocephin. Special precautions maintained.         "

## 2021-05-04 NOTE — DISCHARGE SUMMARY
[facility]    Hospitalist Discharge Summary       Date of Admission:  5/1/2021  Date of Discharge:  5/4/2021  Discharging Provider: Agustin Siu MD      Discharge Diagnoses   COVID-19  Acute hypoxic respiratory failure, improved  Catheter associated UTI    Follow-ups Needed After Discharge   Follow-up Appointments     Follow-up and recommended labs and tests       Follow up with nursing home provider. Follow up urine cultures.           Unresulted Labs Ordered in the Past 30 Days of this Admission     Date and Time Order Name Status Description    5/1/2021 1646 Blood culture Preliminary     5/1/2021 1646 Blood culture Preliminary       These results will be followed up by nursing home physician    Hospital Course   Ron Gilmore is a 80 year old male with complex past medical history cerebrovascular accident with residual left-sided weakness, chronic obstructive pulmonary disease, depression, dysphagia and a recent admission to 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   Patient presented to the ED with fever, shortness of breath and vomiting, PCR positive for COVID 19. He was placed on 4 L of oxygen and the oxygen saturation improved to 96%. CXR showed only some shallow inspiration, no clear infiltrates.  His procalcitonin is less than 0.05 and proBNP is low at 664.    He received first immunization shot for COVID-19 a while ago, but unfortunately, he missed his second dose because of his hospitalization at [facility] in 03/2021.   Patient down to 2L O2 on 5/4/2021. Given his rapidly improving inflammatory markers (CRP 80s-->20), partial immunization, he is felt to be a low infectious risk and so  is recommended to quarantine for the standard 10 days. He also has a low d-dimer of 0.6, and does not need short term thrombosis prophylaxis. Discharged to TCU for rehab.    Suspected UTI in setting of chronic indwelling Cardona catheter  UA in the ED suggestive of UTI.  Patient had a recent hospitalization for septic shock secondary to E. coli bacteremia related to an infected obstructive right-sided UVJ stone with hydronephrosis.  He underwent right-sided percutaneous nephrostomy tube by IR that was subsequently removed, and he had a stent placed at that time.  He did follow up with Urology on 04/15 and he had the stent removed.  He was put on Bactrim for 7 days.    - Follow urine cultures--growing E faecalis, urine culture sensitivities pending. Patient transitioned empirically to ciprofloxacin for 7 day course at discharge. Follow up results with nursing home provider    Prediabetes. Patient has not had any known A1c > 6.5%, and patient himself denies hx of DM2.    Consultations This Hospital Stay   SPEECH LANGUAGE PATH ADULT IP CONSULT  CARE MANAGEMENT / SOCIAL WORK IP CONSULT    Code Status   Full Code    Time Spent on this Encounter   I, Agustin Siu, personally saw the patient today and spent approximately 35 minutes discharging this patient.       Agustin Siu MD  [facility]  ______________________________________________________________________    Physical Exam   Vital Signs: Temp: 97.9  F (36.6  C) Temp src: Oral BP: (!) 144/77 Pulse: 69   Resp: 18 SpO2: 94 % O2 Device: Nasal cannula Oxygen Delivery: 2 LPM  Weight: 240 lbs 1.3 oz    Constitutional: Morbidly obese male in NAD  HEENT: Eyes nonicteric, oral mucosa moist  Cardiovascular: RRR, normal S1/2, no m/r/g  Respiratory: CTAB, no wheezing or crackles  Vascular: No LE pitting edema  GI: Normoactive bowel sounds, nontender  Skin/Integumen: No rashes  Neuro/Psych: Appropriate affect and mood. A&Ox3, chronic L weakness  noted       Primary Care Physician   Bruno Thomas    Discharge Disposition   Discharged to home  Condition at discharge: Stable    Significant Results and Procedures   Most Recent 3 CBC's:  Recent Labs   Lab Test 05/04/21  0834 05/03/21  0810 05/02/21  1154   WBC 7.0 6.8 5.6   HGB 10.2* 9.9* 10.9*   MCV 90 91 92    180 190     Most Recent 3 BMP's:  Recent Labs   Lab Test 05/04/21  0834 05/03/21  0810 05/02/21  1154    137 136   POTASSIUM 5.3 4.9 4.8   CHLORIDE 104 104 104   CO2 35* 31 34*   BUN 22 18 18   CR 0.62* 0.60* 0.70   ANIONGAP <1* 2* <1*   RAJ 8.9 8.5 8.8   * 120* 139*     Most Recent 2 LFT's:  Recent Labs   Lab Test 05/01/21  1558 03/10/21  0621   AST 29 51*   ALT 24 25   ALKPHOS 60 67   BILITOTAL 0.3 1.2   ,   Results for orders placed or performed during the hospital encounter of 05/01/21   XR Chest Port 1 View    Narrative    CHEST ONE VIEW PORTABLE   5/1/2021 4:32 PM     HISTORY:  Low O2 saturation. Shortness of breath.    COMPARISON: 3/9/2021.      Impression    IMPRESSION: Shallow inspiration accentuates heart size and pulmonary  vascularity. Lungs clear. No pneumothorax. Previously noted right IJ  central venous catheter has been removed.    BRUNO PAYAN MD       Discharge Orders      Discharge Instructions    1. You are recommended to quarantine for total 10 days, until May 10, 2021.  2. You are recommended to get the second dose of the Pfizer COVID-19 vaccine around August 1, 2021.     General info for SNF    Length of Stay Estimate: Short Term Care: Estimated # of Days <30  Condition at Discharge: Improving  Level of care:skilled   Rehabilitation Potential: Good  Admission H&P remains valid and up-to-date: Yes  Recent Chemotherapy: N/A  Use Nursing Home Standing Orders: Yes     Mantoux instructions    Give two-step Mantoux (PPD) Per Facility Policy Yes     Activity - Up with nursing assistance     Reason for your hospital stay    You were hospitalized for COVID-19.  You are also being treated for a UTI.     Follow-up and recommended labs and tests     Follow up with nursing home provider. Follow up urine cultures.     Oxygen Adult/Peds    Oxygen Documentation:   I certify that this patient, Ron Gilmore has been under my care (or a nurse practitioner or physican's assistant working with me). This is the face-to-face encounter for oxygen medical necessity.      Ron Gilmore is now in a chronic stable state and continues to require supplemental oxygen. Patient has continued oxygen desaturation due to COVID-19    Alternative treatment(s) tried or considered and deemed clinically infective for treatment of COVID-19 include steroids.  If portability is ordered, is the patient mobile within the home? no    **Patients who qualify for home O2 coverage under the CMS guidelines require ABG tests or O2 sat readings obtained closest to, but no earlier than 2 days prior to the discharge, as evidence of the need for home oxygen therapy. Testing must be performed while patient is in the chronic stable state. See notes for O2 sats.**     Diet    Follow this diet upon discharge:      Snacks/Supplements Adult: Boost Shake; Between Meals      Combination Diet Dysphagia Diet Level 1: Pureed; Nectar Thickened Liquids (pre-thickened or use instant food thickener)     Discharge Medications   Current Discharge Medication List      START taking these medications    Details   ciprofloxacin (CIPRO) 500 MG tablet Take 1 tablet (500 mg) by mouth 2 times daily for 4 days  Qty: 8 tablet, Refills: 0    Associated Diagnoses: Urinary tract infection associated with indwelling urethral catheter, initial encounter (H)         CONTINUE these medications which have NOT CHANGED    Details   acetaminophen (TYLENOL) 325 MG tablet Take 650 mg by mouth every 4 hours as needed for mild pain as needed for pain/fever Max dose 3000mg/24hr      allopurinol (ZYLOPRIM) 300 MG tablet Take 300 mg by mouth daily      aspirin  (ASA) 325 MG EC tablet Take 1 tablet (325 mg) by mouth daily    Associated Diagnoses: Left arm weakness; Cerebrovascular accident (CVA), unspecified mechanism (H)      atorvastatin (LIPITOR) 10 MG tablet Take 10 mg by mouth daily      Emollient (AMLACTIN ULTRA EX) Apply topically daily as needed TO FEET      metoprolol tartrate (LOPRESSOR) 25 MG tablet Take 0.5 tablets (12.5 mg) by mouth 2 times daily    Associated Diagnoses: Cerebrovascular accident (CVA), unspecified mechanism (H)      PARoxetine (PAXIL) 20 MG tablet Take 20 mg by mouth daily      polyethylene glycol (MIRALAX) 17 GM/Dose powder Take 17 g by mouth daily  Qty: 510 g    Associated Diagnoses: Constipation, unspecified constipation type           Allergies   No Known Allergies

## 2021-05-04 NOTE — CONSULTS
Care Management Initial Consult    General Information  Assessment completed with: Spouse , Daughter Jami and Step Daughter López  Type of CM/SW Visit: Offer D/C Planning    Primary Care Provider verified and updated as needed: Yes   Readmission within the last 30 days: no previous admission in last 30 days      Reason for Consult: discharge planning, other (see comments)    Advance Care Planning:       General Information Comments: High readmission risk    Communication Assessment  Patient's communication style: spoken language (English or Bilingual)             Cognitive  Cognitive/Neuro/Behavioral: .WDL except  Level of Consciousness: alert  Arousal Level: opens eyes spontaneously  Orientation: disoriented to, situation  Mood/Behavior: behavior appropriate to situation, calm, cooperative  Best Language: 0 - No aphasia  Speech: clear, logical, spontaneous    Living Environment:   People in home: spouse     Current living Arrangements: condo     Able to return to prior arrangements: no  Living Arrangement Comments: Caregivers are unable to provide care due to covid    Family/Social Support:  Care provided by:2 PCAs  Provides care for: no one    Marital Status:   Wife, Children          Description of Support System: Once Dr Siu called pt's spouse concerning pt was medically ready for discharge, numerous family members were alerted with outcome being calls from Daughter Jami and Daughter in Law López with huge concern of how they could possibly care for him and family needing to be in quarantine.           Current Resources:   Patient receiving home care services: Yes  Skilled Home Care Services: Skilled Nursing  Community Resources: County Programs, County Worker, DME, Home Care, PCA,   Equipment currently used at home: hospital bed, lift device, wheelchair, manual, wheelchair, power  Supplies currently used at home:      Employment/Financial:  Employment Status:          Financial Concerns: No  concerns identified           Lifestyle & Psychosocial Needs:        Socioeconomic History     Marital status:      Spouse name: Not on file     Number of children: Not on file     Years of education: Not on file     Highest education level: Not on file     Tobacco Use     Smoking status: Former Smoker     Smokeless tobacco: Never Used   Substance and Sexual Activity     Alcohol use: No     Comment: none for 29 yrs     Drug use: No       Functional Status:  Prior to admission patient needed assistance: yes, at baseline, [facility] and [facility] also helps.  Pt spouse is unable to physically help.  One of the PCAs is their grandson.        Mental Health Status:  Mental Health Status: No Current Concerns       Chemical Dependency Status:  Chemical Dependency Status: No Current Concerns             Values/Beliefs:  Spiritual, Cultural Beliefs, Church Practices, Values that affect care:  No               Additional Information:  Pt is medically ready for hospital discharge per Dr Siu. His O2 had been weaned and now back on 2L.  Pt currently does not have caregiver support he requires in able to return home due to unvaccinated PCAs that are now in quarantine.  Discussed TCU with family and with SW.  Pt's family felt pt would not like this idea, but knows they do not have any other options, as it may be another week before quarantine is completed for his PCAs.  Pt's RYANN Dawn is pt's HCA and POA, per her report.  She will discuss this with pt.  His family has agreed for referral to [facility] that is able to accept Covid + pts.  Pt has been to this facility in the past.  SW was updated and will take over the TCU process.  SW questioned skilled need.  In talking to pt's family, it was reported pt has continued increased weakness since his hospitalization in March.  He was able to use a slider board and help with transfers prior to that.  They have now needed to use a aleks lift  for transfers.  Pt will be getting a new power wheelchair delivered on 5/14.    Valery Lima RN   [facility]  [phone]

## 2021-05-04 NOTE — PROGRESS NOTES
Care Management Discharge Note    Discharge Date: 05/04/21([facility] TCU)  Expected Time of Departure: 1830    Discharge Disposition: Transitional Care    Discharge Services: None    Discharge DME: None    Discharge Transportation: (BLS by [facility])    Private pay costs discussed: Not applicable    PAS Confirmation Code:    Patient/family educated on Medicare website which has current facility and service quality ratings:      Education Provided on the Discharge Plan:  yes  Persons Notified of Discharge Plans: patient and dtr López  Patient/Family in Agreement with the Plan: yes    Handoff Referral Completed: Yes    Additional Information:  Patient has been accepted at [facility] into their COVID unit. Keyona Dawn shares patient has been a patient at [facility] before so she is comfortable with the plan.  Patient requires BLS for COVID precautions and because he is on oxygen and unable to regulate.    López did speak with her father and explain the reason for admission to TCU as family and his caregivers cannot afford exposure to him with his COVID status.  Patient's bedside RN reports patient is disappointed he is not going home but understands.  Orders have been sent to [facility] and the PCS and face sheet have been faxed to [facility].  PA approved.  Effective date: 5/4/21  PA reference #: [id number]  Pt. notified:  BAILEE Aguilar

## 2021-05-04 NOTE — PLAN OF CARE
"DATE & TIME: 5/3/21 4099-6577  Cognitive Concerns/ Orientation :O/A x3 forgetful  BEHAVIOR & AGGRESSION TOOL COLOR: Green, calm//cooperative  CIWA SCORE: n/a  ABNL VS/O2: VSS on 1L NC, Sats 92-95%. De-sats to 88-89% on RA.   MOBILITY: Total care, up with lift. Hx stroke, Left hemiparesis. Baseline aleks lift at home and electric scooter, up for meals in chair  PAIN MANAGMENT: Denies  DIET: DD1 was on honey thick got advanced to [place]. Tray set up/some assistance with for meals, pt can drink with R hand, meds whole with pudding  BOWEL/BLADDER: Chronic chirinos for retention, no BM tonight  ABNL LAB/BG: Cr 0.60, CRP 41, and Hgb 9.9. BGM d/c'd  DRAIN/DEVICES: PIV SL  TELEMETRY RHYTHM: N/A  SKIN: Pale. Non-Blanchable redness/discoloration to coccyx, mepilex changed CDI. Abrasion and scab to RLE. Generalized +1 edema. Scrotal erythema, cleansed and Micatin applied  TESTS/PROCEDURES: None scheduled.   D/C DAY/GOALS/PLACE: Per MD note \"2-5 days, once hypoxia improved  OTHER IMPORTANT INFO: Baseline R eyelid droop. Speech following. LS coarse, infrequent congested cough. Continues on Dex/Remdesivir/Rocephin. Special precautions maintained.         "

## 2021-05-04 NOTE — PLAN OF CARE
DATE & TIME: 5/4/21 6379-4476   Cognitive Concerns/ Orientation : A & O x3, forgetful. Pleasant.   BEHAVIOR & AGGRESSION TOOL COLOR: Green  CIWA SCORE: N/A  ABNL VS/O2: VSS were stable on RA until afternoon then was dropping to high 80s (87-89%). Placed back on 2L and oxygen saturation running 94%.   MOBILITY: Total care, up with lift. Hx stroke, Left hemiparesis. Baseline aleks lift at home and electric scooter, up for meals in chair x1.   PAIN MANAGMENT: Denies  DIET: DD1 w/ [place]. Tray set up/some assistance with for meals, pt can drink with R hand, meds whole with pudding/apple sauce. Good appetite.   BOWEL/BLADDER: Chronic chirinos for retention, no BM. Per pt passing a lot of flatus.   ABNL LAB/BG: Carbon dioxide 35, Cr 0.62, Anion gap 1, CRP down to 20.3, and Hgb 10.2.   DRAIN/DEVICES: PIV SL  TELEMETRY RHYTHM: N/A  SKIN: Pale. Non-Blanchable redness/discoloration to coccyx, mepilex changed CDI. Abrasion and scab to RLE. Generalized +1 edema. Scrotal/vincent area erythema, cleansed and Micatin applied x2.   TESTS/PROCEDURES: None scheduled.   D/C DAY/GOALS/PLACE: Today but unable to go back home, awaiting TCU placement.   OTHER IMPORTANT INFO: Special precautions in place. Speech following. LS diminished, infrequent dry cough today. Continues on Dex/Remdesivir/Rocephin.

## 2021-05-05 ENCOUNTER — PATIENT OUTREACH (OUTPATIENT)
Dept: CARE COORDINATION | Facility: CLINIC | Age: 81
End: 2021-05-05

## 2021-05-05 ENCOUNTER — HOSPITAL LABORATORY (OUTPATIENT)
Dept: OTHER | Facility: CLINIC | Age: 81
End: 2021-05-05

## 2021-05-05 DIAGNOSIS — U07.1 CLINICAL DIAGNOSIS OF COVID-19: Primary | ICD-10-CM

## 2021-05-05 NOTE — PLAN OF CARE
Discharge    Patient discharged to [facility] TCU into their COVID unit via Stretcher with BLS by [facility]    Care plan note  3-7 pm   Stable, no changes. VSS on 2L nasal cannula. Report given to TCU. Cipro sent with pt. Was able to administer One more dose of remdesivir and Rocephin prior to discharge.     Listed belongings gathered and returned to patient. Yes  Belongings returned to patient from security/pharmacy Yes  Care Plan and Patient education resolved: Yes  Prescriptions if needed, hard copies sent with patient  NA  Home and hospital acquired medications returned to patient: NA  Medication Bin checked and emptied on discharge No  Follow up appointment made for patient: No

## 2021-05-05 NOTE — PROGRESS NOTES
Clinic Care Coordination Contact    Follow Up Progress Note      Assessment: Pt was seen in ED for shortness of breath and vomiting. Pt. Tested positive for Covid.CC spoke with his wife Yuli who said they moved him to [facility] now as wife is unable to care for him at home and their two usual caregivers have to get Covid tested after having cared for him. Pt. Is okay to return home when care giving at home is secured. [facility] will connect with kristin gould re: discharge plan and will follow up with pt. To assess for needs.    Goals addressed this encounter:   Goals Addressed    None          Intervention/Education provided during outreach: na     Outreach Frequency: weekly    Plan:   Follow up with Kristin Gould discharge   Care Coordinator will follow up in 1 week.

## 2021-05-05 NOTE — PLAN OF CARE
Speech Language Therapy Discharge Summary    Reason for therapy discharge:    Discharged to transitional care facility.    Progress towards therapy goal(s). See goals on Care Plan in [facility] electronic health record for goal details.  Goals not met.  Barriers to achieving goals:   discharge from facility.    Therapy recommendation(s):    Continued therapy is recommended.  Rationale/Recommendations:  Continue ST needs for dysphagia management and diet advancement as tolerated. Discharged on a DDL 2 with nectar thick liquids.

## 2021-05-06 ENCOUNTER — VIRTUAL VISIT (OUTPATIENT)
Dept: GERIATRICS | Facility: CLINIC | Age: 81
End: 2021-05-06
Payer: COMMERCIAL

## 2021-05-06 VITALS
HEART RATE: 57 BPM | HEIGHT: 72 IN | BODY MASS INDEX: 35.19 KG/M2 | OXYGEN SATURATION: 97 % | WEIGHT: 259.8 LBS | RESPIRATION RATE: 18 BRPM | TEMPERATURE: 96.9 F | SYSTOLIC BLOOD PRESSURE: 148 MMHG | DIASTOLIC BLOOD PRESSURE: 69 MMHG

## 2021-05-06 DIAGNOSIS — I10 ESSENTIAL HYPERTENSION: ICD-10-CM

## 2021-05-06 DIAGNOSIS — R39.14 BENIGN PROSTATIC HYPERPLASIA WITH INCOMPLETE BLADDER EMPTYING: ICD-10-CM

## 2021-05-06 DIAGNOSIS — G47.33 OSA (OBSTRUCTIVE SLEEP APNEA): ICD-10-CM

## 2021-05-06 DIAGNOSIS — E66.01 MORBID OBESITY, UNSPECIFIED OBESITY TYPE (H): ICD-10-CM

## 2021-05-06 DIAGNOSIS — I69.391 DYSPHAGIA DUE TO RECENT CEREBROVASCULAR ACCIDENT (CVA): ICD-10-CM

## 2021-05-06 DIAGNOSIS — N40.1 BENIGN PROSTATIC HYPERPLASIA WITH INCOMPLETE BLADDER EMPTYING: ICD-10-CM

## 2021-05-06 DIAGNOSIS — E11.9 TYPE 2 DIABETES MELLITUS WITHOUT COMPLICATION, WITHOUT LONG-TERM CURRENT USE OF INSULIN (H): ICD-10-CM

## 2021-05-06 DIAGNOSIS — N39.0 URINARY TRACT INFECTION ASSOCIATED WITH INDWELLING URETHRAL CATHETER, SUBSEQUENT ENCOUNTER: ICD-10-CM

## 2021-05-06 DIAGNOSIS — R53.81 PHYSICAL DECONDITIONING: ICD-10-CM

## 2021-05-06 DIAGNOSIS — J44.9 CHRONIC OBSTRUCTIVE PULMONARY DISEASE, UNSPECIFIED COPD TYPE (H): ICD-10-CM

## 2021-05-06 DIAGNOSIS — I69.354 HEMIPARESIS AFFECTING LEFT SIDE AS LATE EFFECT OF CEREBROVASCULAR ACCIDENT (CVA) (H): ICD-10-CM

## 2021-05-06 DIAGNOSIS — U07.1 ACUTE RESPIRATORY FAILURE DUE TO COVID-19 (H): Primary | ICD-10-CM

## 2021-05-06 DIAGNOSIS — F33.9 EPISODE OF RECURRENT MAJOR DEPRESSIVE DISORDER, UNSPECIFIED DEPRESSION EPISODE SEVERITY (H): ICD-10-CM

## 2021-05-06 DIAGNOSIS — T83.511D URINARY TRACT INFECTION ASSOCIATED WITH INDWELLING URETHRAL CATHETER, SUBSEQUENT ENCOUNTER: ICD-10-CM

## 2021-05-06 DIAGNOSIS — J96.00 ACUTE RESPIRATORY FAILURE DUE TO COVID-19 (H): Primary | ICD-10-CM

## 2021-05-06 LAB
GAMMA INTERFERON BACKGROUND BLD IA-ACNC: 0 IU/ML
M TB IFN-G CD4+ BCKGRND COR BLD-ACNC: 5.1 IU/ML
M TB TUBERC IFN-G BLD QL: NEGATIVE
MITOGEN IGNF BCKGRD COR BLD-ACNC: 0 IU/ML
MITOGEN IGNF BCKGRD COR BLD-ACNC: 0 IU/ML

## 2021-05-06 PROCEDURE — 99310 SBSQ NF CARE HIGH MDM 45: CPT | Performed by: NURSE PRACTITIONER

## 2021-05-06 RX ORDER — NITROFURANTOIN 25; 75 MG/1; MG/1
100 CAPSULE ORAL 2 TIMES DAILY
Status: ON HOLD | COMMUNITY
Start: 2021-05-06 | End: 2021-05-17

## 2021-05-06 RX ORDER — IPRATROPIUM BROMIDE AND ALBUTEROL 20; 100 UG/1; UG/1
1 SPRAY, METERED RESPIRATORY (INHALATION) 4 TIMES DAILY
COMMUNITY

## 2021-05-06 ASSESSMENT — MIFFLIN-ST. JEOR: SCORE: 1936.45

## 2021-05-06 NOTE — LETTER
"    5/6/2021        RE: Ron Gilmore  [address]  [place] [address]         [facility]    Ron Gilmore is being evaluated via a billable video.   The patient has been notified of following:  \"This video visit will be conducted via a call between you and your provider. We have found that certain health care needs can be provided without the need for an in-person physical exam.  This service lets us provide the care you need with a video conversation. If during the course of the call the provider feels a video visit is not appropriate, you will not be charged for this service.\"   The provider has received verbal consent for a Video Visit from the patient and or first contact? Yes  Patient/facility staff would like the video invitation sent by: N/A   Video Start Time: ***  Which Facility the Patient is at during the time of visit: [facility]     PRIMARY CARE PROVIDER AND CLINIC:  Augustin Thomas MD, [address] / [place] [address]  No chief complaint on file.    [place] Medical Record Number:  [id number]  Ron Gilmore  is a 80 year old  (1940), admitted to the above facility from  [facility]. Hospital stay 5/1 through 5/4/21. Admitted to this facility for  rehab, medical management and nursing care.    HPI:    HPI information obtained from: facility chart records, facility staff, patient report and [facility] chart review.     Brief Summary of Hospital Course:     PMH: hx CVA with residual left-sided weakness, COPD, depression, dysphagia     Patient admitted to [facility] 5/1-5/4/21 due to SOB and vomiting. Tested + COVID on 5/1. Was placed on 4L O2. CXR + shallow inspiration, no clear infiltrates. Received 1st     Transferred to [facility] on 5/4/21.       Updates on Status Since Skilled nursing Admission:               CODE STATUS/ADVANCE DIRECTIVES DISCUSSION:  {CODE STATUS:974596}  Patient's living condition: lives with " spouse  ALLERGIES: Patient has no known allergies.  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 He also has no past medical history of Asymptomatic human immunodeficiency virus (HIV) infection status (H), Blood in semen, Cerebral palsy (H), Complication of anesthesia, Congenital renal agenesis and dysgenesis, Difficult intubation, Epididymitis, bilateral, Epididymitis, left, Epididymitis, right, Goiter, Hernia, abdominal, History of spinal cord injury, History of thrombophlebitis, Horseshoe kidney, Hydrocephalus (H), Malignant hyperthermia, Mumps, Orchitis, epididymitis, and epididymo-orchitis, with abscess, Palpitations, Parkinsons disease (H), Penile discharge, PONV (postoperative nausea and vomiting), Prostate infection, Spider veins, Spina bifida (H), Spinal headache, STD (sexually transmitted disease), Swelling of testicle, Tethered cord (H), or Tuberculosis.  PAST SURGICAL HISTORY:   has a past surgical history that includes Laminectomy lumbar one level; joint replacement (Right); knee surgery (Bilateral); Appendectomy open; Tonsillectomy; Arthroscopy shoulder rotator cuff repair; cataracts (Bilateral); Cystoscopy (10/19/2011); Cholecystectomy; Eye surgery; colonoscopy; Cystoscopy, transurethral resection (TUR) prostate, combined (N/A, 2/21/2018); Electrophysiology Loop Recorder Implant (N/A, 1/20/2020); IR Nephrostomy Tube Placement Right (3/9/2021); IR Ureteral Stent Placement Right (3/16/2021); and Laser holmium lithotripsy ureter(s),  insert stent, combined (Right, 4/14/2021).  FAMILY HISTORY: family history includes Prostate Cancer in his father.  SOCIAL HISTORY:   reports that he has quit smoking. He has never used smokeless tobacco. He reports that he does not drink alcohol or use drugs.  Current Outpatient Medications   Medication Sig Dispense Refill     acetaminophen (TYLENOL) 325 MG tablet Take 650 mg by mouth every 4 hours as needed for mild pain as needed for pain/fever Max dose 3000mg/24hr       allopurinol (ZYLOPRIM) 300 MG tablet Take 300 mg by mouth daily       aspirin (ASA) 325 MG EC tablet Take 1 tablet (325 mg) by mouth daily       atorvastatin (LIPITOR) 10 MG tablet Take 10 mg by mouth daily       ciprofloxacin (CIPRO) 500 MG tablet Take 1 tablet (500 mg) by mouth 2 times daily for 4 days 8 tablet 0     Emollient (AMLACTIN ULTRA EX) Apply topically daily as needed TO FEET       metoprolol tartrate (LOPRESSOR) 25 MG tablet Take 0.5 tablets (12.5 mg) by mouth 2 times daily       PARoxetine (PAXIL) 20 MG tablet Take 20 mg by mouth daily       polyethylene glycol (MIRALAX) 17 GM/Dose powder Take 17 g by mouth daily 510 g         discharge medications reconciled and changed, per note/orders      ROS: {ROS FGS:406151}      Vitals:There were no vitals taken for this visit.   Limited Visit Exam done given COVID-19 precautions:  {Nursing home physical exam :923816}      Lab/Diagnostic data:  {fgslab:495244}      ASSESSMENT/PLAN:    {FGS DX INITIAL:856409}            Electronically signed by:  ELIZABETH Newsome CNP     Video-Visit Details  Type of service:  {fgsetype:161767}  Video End Time (time video stopped): ***  Distant Location (provider location):  [place] [facility]                   Sincerely,        ELIZABETH Newsome CNP

## 2021-05-06 NOTE — PROGRESS NOTES
" [place] [facility]    Ron Gilmore is being evaluated via a billable video.   The patient has been notified of following:  \"This video visit will be conducted via a call between you and your provider. We have found that certain health care needs can be provided without the need for an in-person physical exam.  This service lets us provide the care you need with a video conversation. If during the course of the call the provider feels a video visit is not appropriate, you will not be charged for this service.\"   The provider has received verbal consent for a Video Visit from the patient and or first contact? Yes  Patient/facility staff would like the video invitation sent by: N/A   Video Start Time: 1044  Which Facility the Patient is at during the time of visit: [facility]     PRIMARY CARE PROVIDER AND CLINIC:  Augustin Thomas MD, [address] / [place] [address]  Chief Complaint   Patient presents with     [facility] Care     [place] [facility] Record Number:  [id number]  Ron Gilmore  is a 80 year old  (1940), admitted to the above facility from  [facility]. Hospital stay 5/1 through 5/4/21. Admitted to this facility for  rehab, medical management and nursing care.    HPI:    HPI information obtained from: facility chart records, facility staff, patient report and [facility] chart review.     Brief Summary of Hospital Course:     PMH: hx CVA with residual left-sided weakness, COPD, depression, dysphagia     Patient admitted to [facility] 5/1-5/4/21 due to SOB and vomiting. Tested + COVID on 5/1. Was placed on 4L O2. CXR + shallow inspiration, no clear infiltrates. Received 1st COVID vaccine previously, missed 2nd dose due to hospitalization in 03/2021. Was treated with dexamethasone and remdesevir. Per hospital notes, had low d-dimer 0.6 and short term anticoagulation prophylaxis was not indicated. Also noted to have UTI associated with chronic " indwelling catheter, UC + E faecalis and was started on IV ceftriaxone then transitioned to course of ciprofloxacin upon discharge.     Transferred to [facility] on 5/4/21.       Updates on Status Since Skilled nursing Admission:     During exam, patient seen resting in bed. Reports doing well, does have fatigue today. Endorses fair appetite. PTA did not require O2, now requires supplemental O2 since COVID diagnosis. Reports does not want to use incentive spirometer. Admits to mild SOB at baseline. Also endorses intermittent dry cough. States at home is primarily wheelchair bound, uses powerchair and aleks lift. States grandson lives close by and assists with transfers in aleks lift to powerchair. Denies constipation, diarrhea. Denies chest pain, SOB, headache, syncope. SW following for discharge planning. Goal is to discharge home w/spouse.         CODE STATUS/ADVANCE DIRECTIVES DISCUSSION:  CPR/Full code   Patient's living condition: lives with spouse  ALLERGIES: Patient has no known allergies.  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 He also has no past medical history of Asymptomatic human immunodeficiency virus (HIV) infection status (H), Blood in semen, Cerebral palsy (H), Complication of anesthesia, Congenital renal agenesis and dysgenesis, Difficult intubation, Epididymitis, bilateral, Epididymitis, left, Epididymitis, right, Goiter, Hernia, abdominal, History of spinal cord injury, History of thrombophlebitis, Horseshoe kidney,  Hydrocephalus (H), Malignant hyperthermia, Mumps, Orchitis, epididymitis, and epididymo-orchitis, with abscess, Palpitations, Parkinsons disease (H), Penile discharge, PONV (postoperative nausea and vomiting), Prostate infection, Spider veins, Spina bifida (H), Spinal headache, STD (sexually transmitted disease), Swelling of testicle, Tethered cord (H), or Tuberculosis.  PAST SURGICAL HISTORY:   has a past surgical history that includes Laminectomy lumbar one level; joint replacement (Right); knee surgery (Bilateral); Appendectomy open; Tonsillectomy; Arthroscopy shoulder rotator cuff repair; cataracts (Bilateral); Cystoscopy (10/19/2011); Cholecystectomy; Eye surgery; colonoscopy; Cystoscopy, transurethral resection (TUR) prostate, combined (N/A, 2/21/2018); Electrophysiology Loop Recorder Implant (N/A, 1/20/2020); IR Nephrostomy Tube Placement Right (3/9/2021); IR Ureteral Stent Placement Right (3/16/2021); and Laser holmium lithotripsy ureter(s), insert stent, combined (Right, 4/14/2021).  FAMILY HISTORY: family history includes Prostate Cancer in his father.  SOCIAL HISTORY:   reports that he has quit smoking. He has never used smokeless tobacco. He reports that he does not drink alcohol or use drugs.  Current Outpatient Medications   Medication Sig Dispense Refill     acetaminophen (TYLENOL) 325 MG tablet Take 650 mg by mouth every 4 hours as needed for mild pain as needed for pain/fever Max dose 3000mg/24hr       allopurinol (ZYLOPRIM) 300 MG tablet Take 300 mg by mouth daily       aspirin (ASA) 325 MG EC tablet Take 1 tablet (325 mg) by mouth daily       atorvastatin (LIPITOR) 10 MG tablet Take 10 mg by mouth daily       ciprofloxacin (CIPRO) 500 MG tablet Take 1 tablet (500 mg) by mouth 2 times daily for 4 days 8 tablet 0     Emollient (AMLACTIN ULTRA EX) Apply topically daily as needed TO FEET       metoprolol tartrate (LOPRESSOR) 25 MG tablet Take 0.5 tablets (12.5 mg) by mouth 2 times daily        PARoxetine (PAXIL) 20 MG tablet Take 20 mg by mouth daily       polyethylene glycol (MIRALAX) 17 GM/Dose powder Take 17 g by mouth daily 510 g         discharge medications reconciled and changed, per note/orders      ROS: 10 point ROS of systems including Constitutional, Eyes, Respiratory, Cardiovascular, Gastroenterology, Genitourinary, Integumentary, Musculoskeletal, Psychiatric were all negative except for pertinent positives noted in my HPI.      Vitals:BP (!) 148/69   Pulse 57   Temp 96.9  F (36.1  C)   Resp 18   Ht 1.829 m (6')   Wt 117.8 kg (259 lb 12.8 oz)   SpO2 97%   BMI 35.24 kg/m     Limited Visit Exam done given COVID-19 precautions:  GENERAL APPEARANCE:  Alert, in no distress, oriented, morbidly obese, cooperative  ENT:  Mouth and posterior oropharynx normal, moist mucous membranes, normal hearing acuity  EYES:  EOM, conjunctivae, lids, pupils and irises normal, PERRL  RESP:  no respiratory distress  CV:  Unable to assess.  M/S:   Gait and station abnormal, wheelchair bound. Uses aleks lift for transfers.  SKIN:  Inspection of skin and subcutaneous tissue baseline  NEURO:   Cranial nerves 2-12 are normal tested and grossly at patient's baseline  PSYCH:  oriented X 3, affect and mood normal      Lab/Diagnostic data:  Recent labs in [facility] reviewed by me today.  and   Most Recent 3 CBC's:  Recent Labs   Lab Test 05/04/21  0834 05/03/21  0810 05/02/21  1154   WBC 7.0 6.8 5.6   HGB 10.2* 9.9* 10.9*   MCV 90 91 92    180 190     Most Recent 3 BMP's:  Recent Labs   Lab Test 05/04/21  0834 05/03/21  0810 05/02/21  1154    137 136   POTASSIUM 5.3 4.9 4.8   CHLORIDE 104 104 104   CO2 35* 31 34*   BUN 22 18 18   CR 0.62* 0.60* 0.70   ANIONGAP <1* 2* <1*   RAJ 8.9 8.5 8.8   * 120* 139*     Lab Results   Component Value Date    A1C 6.0 02/08/2021    A1C 6.0 01/14/2020    A1C 5.9 07/14/2006           ASSESSMENT/PLAN:    (U07.1,  J96.00) Acute respiratory failure due to COVID-19 (H)   (primary encounter diagnosis)  (J44.9) Chronic obstructive pulmonary disease, unspecified COPD type (H)  (G47.33) BRAD (obstructive sleep apnea)  Comment: Acute respiratory failure due to COVID and COPD. Tested + COVID on 5/1/21. Chronic BRAD, wears CPAP. Requires 1L O2.   Plan:   - Check CBC & BMP 5/10 dx COVID, HTN  - Check TSH 5/10 dx decreased appetite, fatigue  - Add combivent inhaler 1 puff QID dx acute respiratory failure  - Continue ASA 325mg every day for DVT prophylaxis  - Change O2 to 1-2L PRN to keep O2 > 90%. Monitor O2 sats qshift and w/therapy, wean as tolerated.  - Continue CPAP qHS  - Patient verbalizes understanding and agrees with treatment plan.     (F33.9) Episode of recurrent major depressive disorder, unspecified depression episode severity (H)  Comment: Chronic  Plan:   - Continue paxil  - Monitor for changes in mood or behaviors    (E11.9) Type 2 diabetes mellitus without complication, without long-term current use of insulin (H)  Comment: Controlled w/diet, last A1C 6% in 02/2021  Plan:   - Check A1C every 3-4 months    (I69.391) Dysphagia due to recent cerebrovascular accident (CVA)  Comment: Chronic r/t prior CVA  Plan:   - ST following, recently upgraded die to DD3 w/nectar thick liquids   -Patient verbalizes understanding and agrees with treatment plan.     (I69.354) Hemiparesis affecting left side as late effect of cerebrovascular accident (CVA) (H)  (E66.01) Morbid obesity, unspecified obesity type (H)  (R53.81) Physical deconditioning  Comment: Chronic left-sided hemiparesis r/t prior CVA. Chronic morbid obesity w/chronic physical deconditioning. PTA lives w/spouse. Nonambulatory, wheelchair bound. Uses aleks lift for transfers. Requires assistance with ADLs. Body mass index is 35.24 kg/m .  Plan:   - Continue ASA, lipitor  - Encourage active participation in therapy session to increase strength and promote independence in activities and ADLs.   - Cognitive testing to be done in  therapy.   - SW following for discharge planning.   - Patient verbalizes understanding and agrees with treatment plan.     (T83.511D,  N39.0) Urinary tract infection associated with indwelling urethral catheter, subsequent encounter  (N40.1,  R39.14) Benign prostatic hyperplasia with incomplete bladder emptying  Comment: Acute UTI, UC+ 5/4 50-100K Enterococcus faecalis and 10-50K Candida tropicalis. Recent hospitalization for septic shock secondary to E coli bacteremia related to an infected obstructive right-sided UVJ stone with hydronephrosis. S/p right-sided percutaneous nephrostomy tube by IR that was removed w/stent placement. S/p stent removal on 4/14.   Plan:   - Discontinue ciprofloxacin  - Add macrobid 100mg BID x 7 days dx UTI  - Patient to follow-up with Dr. Sullivan as directed  - Patient verbalizes understanding and agrees with treatment plan.     (I10) Essential hypertension  Comment: Controlled  Plan:   - Continue metoprolol  - Monitor BP/HR          Total time spent with patient visit at the [facility] was 37 mins including patient visit and review of past records. Greater than 50% of total time (22 minutes) spent with counseling patient regarding treatment plan, medication management, follow-up labs, and follow-up appointments. Patient verbalizes understanding and agrees with treatment plan. Coordinating care with SW regarding discharge planning.       Electronically signed by:  ELIZABETH Newsome CNP     Video-Visit Details  Type of service:  Video Visit  Video End Time (time video stopped): 1055  Distant Location (provider location):  [facility]

## 2021-05-09 ENCOUNTER — HOSPITAL LABORATORY (OUTPATIENT)
Dept: OTHER | Facility: CLINIC | Age: 81
End: 2021-05-09

## 2021-05-10 ENCOUNTER — TRANSFERRED RECORDS (OUTPATIENT)
Dept: HEALTH INFORMATION MANAGEMENT | Facility: CLINIC | Age: 81
End: 2021-05-10

## 2021-05-10 ENCOUNTER — HOSPITAL ENCOUNTER (INPATIENT)
Facility: CLINIC | Age: 81
LOS: 6 days | Discharge: NURSING, REHAB OR LONG-TERM CARE | DRG: 177 | End: 2021-05-17
Attending: PHYSICIAN ASSISTANT | Admitting: INTERNAL MEDICINE
Payer: COMMERCIAL

## 2021-05-10 ENCOUNTER — TELEPHONE (OUTPATIENT)
Dept: GERIATRICS | Facility: CLINIC | Age: 81
End: 2021-05-10

## 2021-05-10 ENCOUNTER — APPOINTMENT (OUTPATIENT)
Dept: GENERAL RADIOLOGY | Facility: CLINIC | Age: 81
DRG: 177 | End: 2021-05-10
Attending: PHYSICIAN ASSISTANT
Payer: COMMERCIAL

## 2021-05-10 ENCOUNTER — NURSING HOME VISIT (OUTPATIENT)
Dept: GERIATRICS | Facility: CLINIC | Age: 81
End: 2021-05-10
Payer: COMMERCIAL

## 2021-05-10 VITALS
HEIGHT: 72 IN | HEART RATE: 104 BPM | OXYGEN SATURATION: 91 % | DIASTOLIC BLOOD PRESSURE: 78 MMHG | BODY MASS INDEX: 31.65 KG/M2 | TEMPERATURE: 97.3 F | RESPIRATION RATE: 20 BRPM | SYSTOLIC BLOOD PRESSURE: 126 MMHG | WEIGHT: 233.7 LBS

## 2021-05-10 DIAGNOSIS — R53.81 PHYSICAL DECONDITIONING: ICD-10-CM

## 2021-05-10 DIAGNOSIS — J02.9 PHARYNGITIS, UNSPECIFIED ETIOLOGY: ICD-10-CM

## 2021-05-10 DIAGNOSIS — I50.31 ACUTE DIASTOLIC CONGESTIVE HEART FAILURE (H): ICD-10-CM

## 2021-05-10 DIAGNOSIS — J96.00 ACUTE RESPIRATORY FAILURE DUE TO COVID-19 (H): Primary | ICD-10-CM

## 2021-05-10 DIAGNOSIS — I69.354 HEMIPARESIS AFFECTING LEFT SIDE AS LATE EFFECT OF CEREBROVASCULAR ACCIDENT (CVA) (H): ICD-10-CM

## 2021-05-10 DIAGNOSIS — E53.8 VITAMIN B12 DEFICIENCY (NON ANEMIC): Primary | ICD-10-CM

## 2021-05-10 DIAGNOSIS — N39.0 URINARY TRACT INFECTION ASSOCIATED WITH INDWELLING URETHRAL CATHETER, SUBSEQUENT ENCOUNTER: ICD-10-CM

## 2021-05-10 DIAGNOSIS — I69.391 DYSPHAGIA DUE TO RECENT CEREBROVASCULAR ACCIDENT (CVA): ICD-10-CM

## 2021-05-10 DIAGNOSIS — U07.1 ACUTE RESPIRATORY FAILURE DUE TO COVID-19 (H): Primary | ICD-10-CM

## 2021-05-10 DIAGNOSIS — T83.511D URINARY TRACT INFECTION ASSOCIATED WITH INDWELLING URETHRAL CATHETER, SUBSEQUENT ENCOUNTER: ICD-10-CM

## 2021-05-10 DIAGNOSIS — J96.01 ACUTE RESPIRATORY FAILURE WITH HYPOXIA (H): ICD-10-CM

## 2021-05-10 DIAGNOSIS — E66.01 MORBID OBESITY, UNSPECIFIED OBESITY TYPE (H): ICD-10-CM

## 2021-05-10 DIAGNOSIS — J18.9 PNEUMONIA OF BOTH LUNGS DUE TO INFECTIOUS ORGANISM, UNSPECIFIED PART OF LUNG: ICD-10-CM

## 2021-05-10 DIAGNOSIS — R63.0 DECREASED APPETITE: ICD-10-CM

## 2021-05-10 DIAGNOSIS — J44.9 CHRONIC OBSTRUCTIVE PULMONARY DISEASE, UNSPECIFIED COPD TYPE (H): ICD-10-CM

## 2021-05-10 LAB
ALBUMIN SERPL-MCNC: 2 G/DL (ref 3.4–5)
ALP SERPL-CCNC: 88 U/L (ref 40–150)
ALT SERPL W P-5'-P-CCNC: 37 U/L (ref 0–70)
ANION GAP SERPL CALCULATED.3IONS-SCNC: 3 MMOL/L (ref 3–14)
ANION GAP SERPL CALCULATED.3IONS-SCNC: 4 MMOL/L (ref 3–14)
AST SERPL W P-5'-P-CCNC: 35 U/L (ref 0–45)
BASOPHILS # BLD AUTO: 0 10E9/L (ref 0–0.2)
BASOPHILS NFR BLD AUTO: 0.4 %
BILIRUB SERPL-MCNC: 0.5 MG/DL (ref 0.2–1.3)
BUN SERPL-MCNC: 21 MG/DL (ref 7–30)
BUN SERPL-MCNC: 27 MG/DL (ref 7–30)
CALCIUM SERPL-MCNC: 8.2 MG/DL (ref 8.5–10.1)
CALCIUM SERPL-MCNC: 8.3 MG/DL (ref 8.5–10.1)
CHLORIDE SERPL-SCNC: 101 MMOL/L (ref 94–109)
CHLORIDE SERPL-SCNC: 99 MMOL/L (ref 94–109)
CO2 SERPL-SCNC: 31 MMOL/L (ref 20–32)
CO2 SERPL-SCNC: 32 MMOL/L (ref 20–32)
CREAT SERPL-MCNC: 0.65 MG/DL (ref 0.66–1.25)
CREAT SERPL-MCNC: 0.7 MG/DL (ref 0.66–1.25)
D DIMER PPP FEU-MCNC: 1.4 UG/ML FEU (ref 0–0.5)
DIFFERENTIAL METHOD BLD: ABNORMAL
EOSINOPHIL # BLD AUTO: 0 10E9/L (ref 0–0.7)
EOSINOPHIL NFR BLD AUTO: 0.5 %
ERYTHROCYTE [DISTWIDTH] IN BLOOD BY AUTOMATED COUNT: 16.4 % (ref 10–15)
ERYTHROCYTE [DISTWIDTH] IN BLOOD BY AUTOMATED COUNT: 16.5 % (ref 10–15)
GFR SERPL CREATININE-BSD FRML MDRD: 90 ML/MIN/{1.73_M2}
GFR SERPL CREATININE-BSD FRML MDRD: >90 ML/MIN/{1.73_M2}
GLUCOSE SERPL-MCNC: 135 MG/DL (ref 70–99)
GLUCOSE SERPL-MCNC: 90 MG/DL (ref 70–99)
HCT VFR BLD AUTO: 37.1 % (ref 40–53)
HCT VFR BLD AUTO: 37.5 % (ref 40–53)
HGB BLD-MCNC: 10.9 G/DL (ref 13.3–17.7)
HGB BLD-MCNC: 11.3 G/DL (ref 13.3–17.7)
IMM GRANULOCYTES # BLD: 0.2 10E9/L (ref 0–0.4)
IMM GRANULOCYTES NFR BLD: 2.2 %
INTERPRETATION ECG - MUSE: NORMAL
LACTATE BLD-SCNC: 1.7 MMOL/L (ref 0.7–2)
LYMPHOCYTES # BLD AUTO: 0.8 10E9/L (ref 0.8–5.3)
LYMPHOCYTES NFR BLD AUTO: 9.6 %
MCH RBC QN AUTO: 25.6 PG (ref 26.5–33)
MCH RBC QN AUTO: 26.2 PG (ref 26.5–33)
MCHC RBC AUTO-ENTMCNC: 29.4 G/DL (ref 31.5–36.5)
MCHC RBC AUTO-ENTMCNC: 30.1 G/DL (ref 31.5–36.5)
MCV RBC AUTO: 87 FL (ref 78–100)
MCV RBC AUTO: 87 FL (ref 78–100)
MONOCYTES # BLD AUTO: 1.1 10E9/L (ref 0–1.3)
MONOCYTES NFR BLD AUTO: 13 %
NEUTROPHILS # BLD AUTO: 6.2 10E9/L (ref 1.6–8.3)
NEUTROPHILS NFR BLD AUTO: 74.3 %
NRBC # BLD AUTO: 0 10*3/UL
NRBC BLD AUTO-RTO: 0 /100
PLATELET # BLD AUTO: 173 10E9/L (ref 150–450)
PLATELET # BLD AUTO: 173 10E9/L (ref 150–450)
POTASSIUM SERPL-SCNC: 4.1 MMOL/L (ref 3.4–5.3)
POTASSIUM SERPL-SCNC: 4.1 MMOL/L (ref 3.4–5.3)
PROT SERPL-MCNC: 7.2 G/DL (ref 6.8–8.8)
RBC # BLD AUTO: 4.26 10E12/L (ref 4.4–5.9)
RBC # BLD AUTO: 4.31 10E12/L (ref 4.4–5.9)
SODIUM SERPL-SCNC: 134 MMOL/L (ref 133–144)
SODIUM SERPL-SCNC: 136 MMOL/L (ref 133–144)
TROPONIN I SERPL-MCNC: <0.015 UG/L (ref 0–0.04)
TSH SERPL DL<=0.005 MIU/L-ACNC: 0.6 MU/L (ref 0.4–4)
WBC # BLD AUTO: 7.7 10E9/L (ref 4–11)
WBC # BLD AUTO: 8.3 10E9/L (ref 4–11)

## 2021-05-10 PROCEDURE — 96365 THER/PROPH/DIAG IV INF INIT: CPT

## 2021-05-10 PROCEDURE — 99285 EMERGENCY DEPT VISIT HI MDM: CPT | Mod: 25

## 2021-05-10 PROCEDURE — 80053 COMPREHEN METABOLIC PANEL: CPT | Performed by: PHYSICIAN ASSISTANT

## 2021-05-10 PROCEDURE — 96366 THER/PROPH/DIAG IV INF ADDON: CPT

## 2021-05-10 PROCEDURE — 36415 COLL VENOUS BLD VENIPUNCTURE: CPT

## 2021-05-10 PROCEDURE — 85379 FIBRIN DEGRADATION QUANT: CPT | Performed by: PHYSICIAN ASSISTANT

## 2021-05-10 PROCEDURE — 99310 SBSQ NF CARE HIGH MDM 45: CPT | Performed by: NURSE PRACTITIONER

## 2021-05-10 PROCEDURE — 83605 ASSAY OF LACTIC ACID: CPT | Performed by: PHYSICIAN ASSISTANT

## 2021-05-10 PROCEDURE — 84484 ASSAY OF TROPONIN QUANT: CPT | Performed by: PHYSICIAN ASSISTANT

## 2021-05-10 PROCEDURE — 93005 ELECTROCARDIOGRAM TRACING: CPT

## 2021-05-10 PROCEDURE — 250N000011 HC RX IP 250 OP 636: Performed by: PHYSICIAN ASSISTANT

## 2021-05-10 PROCEDURE — 85025 COMPLETE CBC W/AUTO DIFF WBC: CPT | Performed by: PHYSICIAN ASSISTANT

## 2021-05-10 PROCEDURE — 71045 X-RAY EXAM CHEST 1 VIEW: CPT

## 2021-05-10 PROCEDURE — 87040 BLOOD CULTURE FOR BACTERIA: CPT | Performed by: PHYSICIAN ASSISTANT

## 2021-05-10 RX ORDER — PIPERACILLIN SODIUM, TAZOBACTAM SODIUM 4; .5 G/20ML; G/20ML
4.5 INJECTION, POWDER, LYOPHILIZED, FOR SOLUTION INTRAVENOUS ONCE
Status: COMPLETED | OUTPATIENT
Start: 2021-05-10 | End: 2021-05-11

## 2021-05-10 RX ORDER — LEVOFLOXACIN 750 MG/1
750 TABLET, FILM COATED ORAL DAILY
Status: ON HOLD | COMMUNITY
End: 2021-05-17

## 2021-05-10 RX ADMIN — PIPERACILLIN SODIUM AND TAZOBACTAM SODIUM 4.5 G: 4; .5 INJECTION, POWDER, LYOPHILIZED, FOR SOLUTION INTRAVENOUS at 23:30

## 2021-05-10 ASSESSMENT — ENCOUNTER SYMPTOMS
SORE THROAT: 1
SHORTNESS OF BREATH: 1
FEVER: 0
VOMITING: 0
CHILLS: 0
HEADACHES: 0
NAUSEA: 0

## 2021-05-10 ASSESSMENT — MIFFLIN-ST. JEOR: SCORE: 1818.06

## 2021-05-10 NOTE — PROGRESS NOTES
[facility]    [place] [facility] Record Number:  [id number]  Place of Service where encounter took place:  [facility] ([facility] [[id number]]  Chief Complaint   Patient presents with     RECHECK       HPI:    Ron Gilmore  is a 80 year old (1940), who is being seen today for an episodic care visit.  HPI information obtained from: facility chart records, facility staff, patient report and [facility] chart review.     PMH: hx CVA with residual left-sided weakness, COPD, depression, dysphagia      Patient admitted to [facility] 5/1-5/4/21 due to SOB and vomiting. Tested + COVID on 5/1. Was placed on 4L O2. CXR + shallow inspiration, no clear infiltrates. Received 1st COVID vaccine previously, missed 2nd dose due to hospitalization in 03/2021. Was treated with dexamethasone and remdesevir. Per hospital notes, had low d-dimer 0.6 and short term anticoagulation prophylaxis was not indicated. Also noted to have UTI associated with chronic indwelling catheter, UC + E faecalis and was started on IV ceftriaxone then transitioned to course of ciprofloxacin upon discharge.      [facility] [place] [facility]21.       Today's concern is:    RN reports concern in patient status today, states patient has low-grade fever and requires 3L O2 (previous O2 sats stable on 2L O2). Also reports patient is no longer followed by therapy team due to patient refusing to participate.     During exam, patient seen resting in bed. Reports feeling fatigued today. States lost voice a few days ago d/t coughing. Reports dry cough. Endorses SOB at rest, has been taking inhaler w/relief. Admits to poor intake, reports decreased intake over the past 4 days due to lack of appetite. Denies constipation, diarrhea. Denies chest pain, SOB, headache, syncope. SW following for discharge planning.        Past Medical and Surgical History reviewed in Epic today.    MEDICATIONS:    Current Outpatient Medications   Medication  Sig Dispense Refill     acetaminophen (TYLENOL) 325 MG tablet Take 650 mg by mouth every 4 hours as needed for mild pain as needed for pain/fever Max dose 3000mg/24hr       allopurinol (ZYLOPRIM) 300 MG tablet Take 300 mg by mouth daily       aspirin (ASA) 325 MG EC tablet Take 1 tablet (325 mg) by mouth daily       atorvastatin (LIPITOR) 10 MG tablet Take 10 mg by mouth daily       Emollient (AMLACTIN ULTRA EX) Apply topically daily as needed TO FEET       ipratropium-albuterol (COMBIVENT RESPIMAT)  MCG/ACT inhaler Inhale 1 puff into the lungs 4 times daily       metoprolol tartrate (LOPRESSOR) 25 MG tablet Take 0.5 tablets (12.5 mg) by mouth 2 times daily       nitroFURantoin macrocrystal-monohydrate (MACROBID) 100 MG capsule Take 100 mg by mouth 2 times daily       PARoxetine (PAXIL) 20 MG tablet Take 20 mg by mouth daily       polyethylene glycol (MIRALAX) 17 GM/Dose powder Take 17 g by mouth daily 510 g      levofloxacin (LEVAQUIN) 750 MG tablet Take 750 mg by mouth daily           REVIEW OF SYSTEMS:  4 point ROS including Respiratory, CV, GI and , other than that noted in the HPI,  is negative      Objective:  /78   Pulse 104   Temp 97.3  F (36.3  C)   Resp 20   Ht 1.829 m (6')   Wt 106 kg (233 lb 11.2 oz)   SpO2 91%   BMI 31.70 kg/m    Exam:  Limited observational exam due to COVID19 precautions  GENERAL APPEARANCE:  Alert, oriented, cooperative. Hoarse, soft voice.   ENT:  Mouth and posterior oropharynx normal, moist mucous membranes, [place]  EYES:  EOM, conjunctivae, lids, pupils and irises normal, PERRL  RESP:  Poor respiratory effort and palpation of chest normal, diminished breath sounds throughout  CV:  Palpation and auscultation of heart done , regular rate and rhythm, no murmur, rub, or gallop, no edema  M/S:   Gait and station abnormal, wheelchair bound. Uses aleks lift for transfers. Digits and nails normal  SKIN:  Inspection of skin and subcutaneous tissue baseline  NEURO:    Cranial nerves 2-12 are normal tested and grossly at patient's baseline  PSYCH:  Oriented X 3, affect and mood normal    Wt Readings from Last 4 Encounters:   05/10/21 106 kg (233 lb 11.2 oz)   05/06/21 117.8 kg (259 lb 12.8 oz)   05/03/21 108.9 kg (240 lb 1.3 oz)   04/14/21 109.1 kg (240 lb 8 oz)       Labs:   Labs done in SNF are in [facility]. Please refer to them using [employer] Everywhere., Recent labs in EPIC reviewed by me today.  and   Most Recent 3 CBC's:  Recent Labs   Lab Test 05/10/21  2110 05/10/21  0543 05/04/21  0834   WBC 8.3 7.7 7.0   HGB 11.3* 10.9* 10.2*   MCV 87 87 90    173 158     Most Recent 3 BMP's:  Recent Labs   Lab Test 05/10/21  2110 05/10/21  0543 05/04/21  0834    134 137   POTASSIUM 4.1 4.1 5.3   CHLORIDE 101 99 104   CO2 32 31 35*   BUN 27 21 22   CR 0.70 0.65* 0.62*   ANIONGAP 3 4 <1*   RAJ 8.3* 8.2* 8.9   * 90 136*     TSH   Date Value Ref Range Status   05/10/2021 0.60 0.40 - 4.00 mU/L Final     Lab Results   Component Value Date    A1C 6.0 02/08/2021    A1C 6.0 01/14/2020    A1C 5.9 07/14/2006         ASSESSMENT/PLAN:    (U07.1,  J96.00) Acute respiratory failure due to COVID-19 (H)  (primary encounter diagnosis)  (J44.9) Chronic obstructive pulmonary disease, unspecified COPD type (H)  (I69.391) Dysphagia due to recent cerebrovascular accident (CVA)  (J02.9) Pharyngitis  Comment: Concern for aspiration pneumonia vs COPD exacerbation given T100.9F, worsening dysphagia (DD1 w/nectar thick liquids) and increased O2 requirements to 3L. Acute pharyngitis. Noted to have chronic dysphagia r/t prior CVA, was on DD3 w/nectar thick liquid diet last week. Tested + COVID on 5/1/21.   Plan:   - CXR AP&L 5/10  - Check CBC & BMP 5/12   - Start levofloxacin 750mg every day x 7 days dx aspiration pneumonia  - Check vital signs q4hrs x 24hrs   - ST following, downgraded diet to DD1 due to SOB and coughing   - Encourage fluids  - Patient verbalizes understanding and agrees  with treatment plan.     (T83.511D,  N39.0) Urinary tract infection associated with indwelling urethral catheter, subsequent encounter  Comment: Acute UTI, UC+ 5/4 50-100K Enterococcus faecalis and 10-50K Candida tropicalis. Recent hospitalization for septic shock secondary to E coli bacteremia related to an infected obstructive right-sided UVJ stone with hydronephrosis. S/p right-sided percutaneous nephrostomy tube by IR that was removed w/stent placement. S/p stent removal on 4/14.   Plan:   - Course of macrobid to be completed on 5/13   - Patient to follow-up with Dr. Sullivan as directed  - Patient verbalizes understanding and agrees with treatment plan.      (R63.0) Decreased appetite   Comment: Weight loss r/t decreased appetite.  Plan:   - Dietary and ST following  - Monitor intake, weights  - Patient verbalizes understanding and agrees with treatment plan.     (I69.354) Hemiparesis affecting left side as late effect of cerebrovascular accident (CVA) (H)  (E66.01) Morbid obesity, unspecified obesity type (H)  (R53.81) Physical deconditioning  Comment: Chronic left-sided hemiparesis r/t prior CVA. Chronic morbid obesity w/chronic physical deconditioning. PTA lives w/spouse. Nonambulatory, wheelchair bound. Uses aleks lift for transfers. Requires assistance with ADLs. Body mass index is 31.7 kg/m . No longer on therapy d/t patient refusal  Plan:   - Continue ASA, lipitor  - Cognitive testing to be done in therapy.   - SW following for discharge planning.   - Patient verbalizes understanding and agrees with treatment plan.             Total time spent with patient visit at the skilled nursing facility was 39 mins including patient visit and review of past records. Greater than 50% of total time (21 minutes) spent with counseling patient regarding treatment plan, medication management, follow-up labs, and follow-up appointments. Patient verbalizes understanding and agrees with treatment plan. Coordinating care  with SW regarding discharge planning. Coordinating care with RN regarding changes to patient's treatment plan.     Electronically signed by:  ELIZABETH Newsome CNP

## 2021-05-11 ENCOUNTER — APPOINTMENT (OUTPATIENT)
Dept: SPEECH THERAPY | Facility: CLINIC | Age: 81
DRG: 177 | End: 2021-05-11
Attending: INTERNAL MEDICINE
Payer: COMMERCIAL

## 2021-05-11 ENCOUNTER — APPOINTMENT (OUTPATIENT)
Dept: CT IMAGING | Facility: CLINIC | Age: 81
DRG: 177 | End: 2021-05-11
Attending: PHYSICIAN ASSISTANT
Payer: COMMERCIAL

## 2021-05-11 PROBLEM — J18.9 PNEUMONIA OF BOTH LUNGS DUE TO INFECTIOUS ORGANISM, UNSPECIFIED PART OF LUNG: Status: ACTIVE | Noted: 2021-05-11

## 2021-05-11 LAB
ALBUMIN UR-MCNC: 30 MG/DL
APPEARANCE UR: CLEAR
BASE EXCESS BLDV CALC-SCNC: 6.8 MMOL/L
BILIRUB UR QL STRIP: NEGATIVE
COLOR UR AUTO: YELLOW
CREAT SERPL-MCNC: 0.78 MG/DL (ref 0.66–1.25)
GFR SERPL CREATININE-BSD FRML MDRD: 86 ML/MIN/{1.73_M2}
GLUCOSE BLDC GLUCOMTR-MCNC: 102 MG/DL (ref 70–99)
GLUCOSE BLDC GLUCOMTR-MCNC: 107 MG/DL (ref 70–99)
GLUCOSE BLDC GLUCOMTR-MCNC: 146 MG/DL (ref 70–99)
GLUCOSE BLDC GLUCOMTR-MCNC: 150 MG/DL (ref 70–99)
GLUCOSE BLDC GLUCOMTR-MCNC: 92 MG/DL (ref 70–99)
GLUCOSE UR STRIP-MCNC: NEGATIVE MG/DL
HBA1C MFR BLD: 6 % (ref 0–5.6)
HCO3 BLDV-SCNC: 34 MMOL/L (ref 21–28)
HGB UR QL STRIP: NEGATIVE
HYALINE CASTS #/AREA URNS LPF: 1 /LPF (ref 0–2)
KETONES UR STRIP-MCNC: 5 MG/DL
LEUKOCYTE ESTERASE UR QL STRIP: ABNORMAL
MUCOUS THREADS #/AREA URNS LPF: PRESENT /LPF
NITRATE UR QL: NEGATIVE
O2/TOTAL GAS SETTING VFR VENT: 4 %
PCO2 BLDV: 61 MM HG (ref 40–50)
PH BLDV: 7.36 PH (ref 7.32–7.43)
PH UR STRIP: 6 PH (ref 5–7)
PO2 BLDV: 14 MM HG (ref 25–47)
POTASSIUM SERPL-SCNC: 4.2 MMOL/L (ref 3.4–5.3)
RBC #/AREA URNS AUTO: 13 /HPF (ref 0–2)
SOURCE: ABNORMAL
SP GR UR STRIP: 1.02 (ref 1–1.03)
SQUAMOUS #/AREA URNS AUTO: <1 /HPF (ref 0–1)
UROBILINOGEN UR STRIP-MCNC: 0 MG/DL (ref 0–2)
WBC #/AREA URNS AUTO: 124 /HPF (ref 0–5)
YEAST #/AREA URNS HPF: ABNORMAL /HPF

## 2021-05-11 PROCEDURE — 99223 1ST HOSP IP/OBS HIGH 75: CPT | Mod: AI | Performed by: INTERNAL MEDICINE

## 2021-05-11 PROCEDURE — 87106 FUNGI IDENTIFICATION YEAST: CPT | Performed by: INTERNAL MEDICINE

## 2021-05-11 PROCEDURE — 87086 URINE CULTURE/COLONY COUNT: CPT | Performed by: INTERNAL MEDICINE

## 2021-05-11 PROCEDURE — 250N000011 HC RX IP 250 OP 636: Performed by: PHYSICIAN ASSISTANT

## 2021-05-11 PROCEDURE — 250N000009 HC RX 250: Performed by: PHYSICIAN ASSISTANT

## 2021-05-11 PROCEDURE — 87186 SC STD MICRODIL/AGAR DIL: CPT | Performed by: INTERNAL MEDICINE

## 2021-05-11 PROCEDURE — 84132 ASSAY OF SERUM POTASSIUM: CPT | Performed by: INTERNAL MEDICINE

## 2021-05-11 PROCEDURE — 36415 COLL VENOUS BLD VENIPUNCTURE: CPT | Performed by: INTERNAL MEDICINE

## 2021-05-11 PROCEDURE — 92526 ORAL FUNCTION THERAPY: CPT | Mod: GN | Performed by: SPEECH-LANGUAGE PATHOLOGIST

## 2021-05-11 PROCEDURE — 250N000012 HC RX MED GY IP 250 OP 636 PS 637: Performed by: INTERNAL MEDICINE

## 2021-05-11 PROCEDURE — 250N000011 HC RX IP 250 OP 636: Performed by: INTERNAL MEDICINE

## 2021-05-11 PROCEDURE — 71275 CT ANGIOGRAPHY CHEST: CPT

## 2021-05-11 PROCEDURE — 250N000009 HC RX 250: Performed by: INTERNAL MEDICINE

## 2021-05-11 PROCEDURE — 81001 URINALYSIS AUTO W/SCOPE: CPT | Performed by: PHYSICIAN ASSISTANT

## 2021-05-11 PROCEDURE — 87088 URINE BACTERIA CULTURE: CPT | Performed by: INTERNAL MEDICINE

## 2021-05-11 PROCEDURE — 999N001017 HC STATISTIC GLUCOSE BY METER IP

## 2021-05-11 PROCEDURE — 250N000013 HC RX MED GY IP 250 OP 250 PS 637: Performed by: INTERNAL MEDICINE

## 2021-05-11 PROCEDURE — 83036 HEMOGLOBIN GLYCOSYLATED A1C: CPT | Performed by: INTERNAL MEDICINE

## 2021-05-11 PROCEDURE — 92610 EVALUATE SWALLOWING FUNCTION: CPT | Mod: GN | Performed by: SPEECH-LANGUAGE PATHOLOGIST

## 2021-05-11 PROCEDURE — 120N000001 HC R&B MED SURG/OB

## 2021-05-11 PROCEDURE — 258N000003 HC RX IP 258 OP 636: Performed by: INTERNAL MEDICINE

## 2021-05-11 PROCEDURE — 82803 BLOOD GASES ANY COMBINATION: CPT | Performed by: PHYSICIAN ASSISTANT

## 2021-05-11 PROCEDURE — 82565 ASSAY OF CREATININE: CPT | Performed by: INTERNAL MEDICINE

## 2021-05-11 RX ORDER — DEXTROSE MONOHYDRATE 25 G/50ML
25-50 INJECTION, SOLUTION INTRAVENOUS
Status: DISCONTINUED | OUTPATIENT
Start: 2021-05-11 | End: 2021-05-17 | Stop reason: HOSPADM

## 2021-05-11 RX ORDER — AMMONIUM LACTATE 12 G/100G
LOTION TOPICAL DAILY PRN
Status: DISCONTINUED | OUTPATIENT
Start: 2021-05-11 | End: 2021-05-17 | Stop reason: HOSPADM

## 2021-05-11 RX ORDER — AMOXICILLIN 250 MG
2 CAPSULE ORAL 2 TIMES DAILY PRN
Status: DISCONTINUED | OUTPATIENT
Start: 2021-05-11 | End: 2021-05-17 | Stop reason: HOSPADM

## 2021-05-11 RX ORDER — ONDANSETRON 2 MG/ML
4 INJECTION INTRAMUSCULAR; INTRAVENOUS EVERY 6 HOURS PRN
Status: DISCONTINUED | OUTPATIENT
Start: 2021-05-11 | End: 2021-05-17 | Stop reason: HOSPADM

## 2021-05-11 RX ORDER — POLYETHYLENE GLYCOL 3350 17 G/17G
17 POWDER, FOR SOLUTION ORAL DAILY
Status: DISCONTINUED | OUTPATIENT
Start: 2021-05-11 | End: 2021-05-17 | Stop reason: HOSPADM

## 2021-05-11 RX ORDER — LIDOCAINE 40 MG/G
CREAM TOPICAL
Status: DISCONTINUED | OUTPATIENT
Start: 2021-05-11 | End: 2021-05-17 | Stop reason: HOSPADM

## 2021-05-11 RX ORDER — SODIUM CHLORIDE 9 MG/ML
INJECTION, SOLUTION INTRAVENOUS CONTINUOUS
Status: DISCONTINUED | OUTPATIENT
Start: 2021-05-11 | End: 2021-05-15

## 2021-05-11 RX ORDER — PAROXETINE 20 MG/1
20 TABLET, FILM COATED ORAL DAILY
Status: DISCONTINUED | OUTPATIENT
Start: 2021-05-11 | End: 2021-05-17 | Stop reason: HOSPADM

## 2021-05-11 RX ORDER — ACETAMINOPHEN 650 MG/1
650 SUPPOSITORY RECTAL EVERY 4 HOURS PRN
Status: DISCONTINUED | OUTPATIENT
Start: 2021-05-11 | End: 2021-05-17 | Stop reason: HOSPADM

## 2021-05-11 RX ORDER — NICOTINE POLACRILEX 4 MG
15-30 LOZENGE BUCCAL
Status: DISCONTINUED | OUTPATIENT
Start: 2021-05-11 | End: 2021-05-17 | Stop reason: HOSPADM

## 2021-05-11 RX ORDER — ATORVASTATIN CALCIUM 10 MG/1
10 TABLET, FILM COATED ORAL DAILY
Status: DISCONTINUED | OUTPATIENT
Start: 2021-05-11 | End: 2021-05-17 | Stop reason: HOSPADM

## 2021-05-11 RX ORDER — PIPERACILLIN SODIUM, TAZOBACTAM SODIUM 3; .375 G/15ML; G/15ML
3.38 INJECTION, POWDER, LYOPHILIZED, FOR SOLUTION INTRAVENOUS EVERY 6 HOURS
Status: DISCONTINUED | OUTPATIENT
Start: 2021-05-11 | End: 2021-05-14 | Stop reason: ALTCHOICE

## 2021-05-11 RX ORDER — ACETAMINOPHEN 325 MG/1
650 TABLET ORAL EVERY 4 HOURS PRN
Status: DISCONTINUED | OUTPATIENT
Start: 2021-05-11 | End: 2021-05-17 | Stop reason: HOSPADM

## 2021-05-11 RX ORDER — ALBUTEROL SULFATE 90 UG/1
2 AEROSOL, METERED RESPIRATORY (INHALATION) EVERY 4 HOURS PRN
Status: DISCONTINUED | OUTPATIENT
Start: 2021-05-11 | End: 2021-05-17 | Stop reason: HOSPADM

## 2021-05-11 RX ORDER — ONDANSETRON 4 MG/1
4 TABLET, ORALLY DISINTEGRATING ORAL EVERY 6 HOURS PRN
Status: DISCONTINUED | OUTPATIENT
Start: 2021-05-11 | End: 2021-05-17 | Stop reason: HOSPADM

## 2021-05-11 RX ORDER — ALLOPURINOL 300 MG/1
300 TABLET ORAL DAILY
Status: DISCONTINUED | OUTPATIENT
Start: 2021-05-11 | End: 2021-05-17 | Stop reason: HOSPADM

## 2021-05-11 RX ORDER — IOPAMIDOL 755 MG/ML
117 INJECTION, SOLUTION INTRAVASCULAR ONCE
Status: COMPLETED | OUTPATIENT
Start: 2021-05-11 | End: 2021-05-11

## 2021-05-11 RX ORDER — AMOXICILLIN 250 MG
1 CAPSULE ORAL 2 TIMES DAILY PRN
Status: DISCONTINUED | OUTPATIENT
Start: 2021-05-11 | End: 2021-05-17 | Stop reason: HOSPADM

## 2021-05-11 RX ADMIN — PAROXETINE HYDROCHLORIDE 20 MG: 20 TABLET, FILM COATED ORAL at 10:33

## 2021-05-11 RX ADMIN — SODIUM CHLORIDE 100 ML: 9 INJECTION, SOLUTION INTRAVENOUS at 01:02

## 2021-05-11 RX ADMIN — VANCOMYCIN HYDROCHLORIDE 2000 MG: 5 INJECTION, POWDER, LYOPHILIZED, FOR SOLUTION INTRAVENOUS at 16:24

## 2021-05-11 RX ADMIN — PIPERACILLIN SODIUM AND TAZOBACTAM SODIUM 3.38 G: 3; .375 INJECTION, POWDER, LYOPHILIZED, FOR SOLUTION INTRAVENOUS at 19:31

## 2021-05-11 RX ADMIN — ATORVASTATIN CALCIUM 10 MG: 10 TABLET, FILM COATED ORAL at 10:33

## 2021-05-11 RX ADMIN — METOPROLOL TARTRATE 12.5 MG: 25 TABLET, FILM COATED ORAL at 20:32

## 2021-05-11 RX ADMIN — PIPERACILLIN SODIUM AND TAZOBACTAM SODIUM 3.38 G: 3; .375 INJECTION, POWDER, LYOPHILIZED, FOR SOLUTION INTRAVENOUS at 12:27

## 2021-05-11 RX ADMIN — ENOXAPARIN SODIUM 40 MG: 40 INJECTION SUBCUTANEOUS at 10:32

## 2021-05-11 RX ADMIN — IOPAMIDOL 117 ML: 755 INJECTION, SOLUTION INTRAVENOUS at 01:02

## 2021-05-11 RX ADMIN — METOPROLOL TARTRATE 12.5 MG: 25 TABLET, FILM COATED ORAL at 10:33

## 2021-05-11 RX ADMIN — VANCOMYCIN HYDROCHLORIDE 2000 MG: 5 INJECTION, POWDER, LYOPHILIZED, FOR SOLUTION INTRAVENOUS at 04:11

## 2021-05-11 RX ADMIN — INSULIN ASPART 1 UNITS: 100 INJECTION, SOLUTION INTRAVENOUS; SUBCUTANEOUS at 12:28

## 2021-05-11 RX ADMIN — ACETAMINOPHEN 650 MG: 325 TABLET, FILM COATED ORAL at 16:18

## 2021-05-11 RX ADMIN — PIPERACILLIN SODIUM AND TAZOBACTAM SODIUM 3.38 G: 3; .375 INJECTION, POWDER, LYOPHILIZED, FOR SOLUTION INTRAVENOUS at 06:26

## 2021-05-11 RX ADMIN — SODIUM CHLORIDE, PRESERVATIVE FREE: 5 INJECTION INTRAVENOUS at 03:10

## 2021-05-11 RX ADMIN — SODIUM CHLORIDE, PRESERVATIVE FREE: 5 INJECTION INTRAVENOUS at 21:41

## 2021-05-11 RX ADMIN — IPRATROPIUM BROMIDE AND ALBUTEROL 1 PUFF: 20; 100 SPRAY, METERED RESPIRATORY (INHALATION) at 10:32

## 2021-05-11 RX ADMIN — IPRATROPIUM BROMIDE AND ALBUTEROL 1 PUFF: 20; 100 SPRAY, METERED RESPIRATORY (INHALATION) at 18:25

## 2021-05-11 RX ADMIN — ALLOPURINOL 300 MG: 300 TABLET ORAL at 10:33

## 2021-05-11 RX ADMIN — IPRATROPIUM BROMIDE AND ALBUTEROL 1 PUFF: 20; 100 SPRAY, METERED RESPIRATORY (INHALATION) at 21:56

## 2021-05-11 ASSESSMENT — ACTIVITIES OF DAILY LIVING (ADL)
ADLS_ACUITY_SCORE: 27

## 2021-05-11 ASSESSMENT — ENCOUNTER SYMPTOMS: COUGH: 1

## 2021-05-11 NOTE — PHARMACY-VANCOMYCIN DOSING SERVICE
Pharmacy Vancomycin Initial Note  Date of Service May 11, 2021  Patient's  1940  80 year old, male    Indication: [facility]ated Pneumonia    Current estimated CrCl = Estimated Creatinine Clearance: 109.5 mL/min (based on SCr of 0.7 mg/dL).    Creatinine for last 3 days  5/10/2021:  5:43 AM Creatinine 0.65 mg/dL;  9:10 PM Creatinine 0.70 mg/dL    Recent Vancomycin Level(s) for last 3 days  No results found for requested labs within last 72 hours.      Vancomycin IV Administrations (past 72 hours)      No vancomycin orders with administrations in past 72 hours.                Nephrotoxins and other renal medications (From now, onward)    Start     Dose/Rate Route Frequency Ordered Stop    21 0600  piperacillin-tazobactam (ZOSYN) 3.375 g vial to attach to  mL bag      3.375 g  over 30 Minutes Intravenous EVERY 6 HOURS 21 0257      21 0400  vancomycin 2000 mg in 0.9% NaCl 500 ml intermittent infusion 2,000 mg      2,000 mg  over 2 Hours Intravenous EVERY 12 HOURS 21 0311            Contrast Orders - past 72 hours (72h ago, onward)    Start     Dose/Rate Route Frequency Ordered Stop    21 0050  iopamidol (ISOVUE-370) solution 117 mL      117 mL Intravenous ONCE 21 0047 21 0102          1. Vancomycin therapeutic monitoring goal: 15-20 mg/L  2. Pharmacy will check vancomycin levels as appropriate in 1-3 Days.    3. Serum creatinine levels will be ordered daily for the first week of therapy and at least twice weekly for subsequent weeks.      Tristin Bernal [facility]

## 2021-05-11 NOTE — PHARMACY-ADMISSION MEDICATION HISTORY
Pharmacy Medication History  Admission medication history interview status for the 5/10/2021  admission is complete. See EPIC admission navigator for prior to admission medications     Location of Interview: Phone  Medication history sources: MAR (Ozzy Ulloa)    Significant changes made to the medication list:  Added Levaquin    In the past week, patient estimated taking medication this percent of the time: greater than 90%    Additional medication history information:   Patient was on Ciprofloxacin 500mg BID from 5/4/21 - 5/6/21.    Medication reconciliation completed by provider prior to medication history? No    Time spent in this activity: 20 min    Prior to Admission medications    Medication Sig Last Dose Taking? Auth Provider   acetaminophen (TYLENOL) 325 MG tablet Take 650 mg by mouth every 4 hours as needed for mild pain as needed for pain/fever Max dose 3000mg/24hr  Yes Reported, Patient   allopurinol (ZYLOPRIM) 300 MG tablet Take 300 mg by mouth daily 5/10/2021 at Unknown time Yes Unknown, Entered By History   aspirin (ASA) 325 MG EC tablet Take 1 tablet (325 mg) by mouth daily 5/10/2021 at Unknown time Yes Sandro Maradiaga MD   atorvastatin (LIPITOR) 10 MG tablet Take 10 mg by mouth daily 5/10/2021 at Unknown time Yes Unknown, Entered By History   Emollient (AMLACTIN ULTRA EX) Apply topically daily as needed TO FEET  Yes Reported, Patient   ipratropium-albuterol (COMBIVENT RESPIMAT)  MCG/ACT inhaler Inhale 1 puff into the lungs 4 times daily 5/10/2021 at 2000 Yes Reported, Patient   levofloxacin (LEVAQUIN) 750 MG tablet Take 750 mg by mouth daily 5/10/2021 at Unknown time Yes Unknown, Entered By History   metoprolol tartrate (LOPRESSOR) 25 MG tablet Take 0.5 tablets (12.5 mg) by mouth 2 times daily 5/10/2021 at x2 Yes Romulo Cavanaugh MD   nitroFURantoin macrocrystal-monohydrate (MACROBID) 100 MG capsule Take 100 mg by mouth 2 times daily 5/10/2021 at x2 Yes Reported, Patient   PARoxetine (PAXIL)  20 MG tablet Take 20 mg by mouth daily 5/10/2021 at Unknown time Yes Unknown, Entered By History   polyethylene glycol (MIRALAX) 17 GM/Dose powder Take 17 g by mouth daily 5/10/2021 at Unknown time Yes Romulo Cavanaugh MD       The information provided in this note is only as accurate as the sources available at the time of update(s)

## 2021-05-11 NOTE — H&P
DATE OF SERVICE: 05/11/2021    CHIEF COMPLAINT: Shortness of breath and hypoxia.    HISTORY OF PRESENT ILLNESS:  Has been obtained partially from the patient.  He seemed to be a poor historian.  I do know him from his most recent hospitalization at [facility]. I did discuss with Marine Morrell PA-C in the ER and I also reviewed his chart extensively.    Mr. Ron Gilmore is a 80-year-old gentleman with complex past medical history of CVA with residual left-sided weakness, COPD, chronic urinary retention with chronic indwelling Cardona catheter, history of septic shock secondary to E. 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  Also history of dyslipidemia, depression, diabetes mellitus type 2 diet-controlled and dysphagia and recent COVID-19 infection who was sent from TCU for evaluation of shortness of breath and hypoxia.     The patient had a few recent hospitalizations at [facility].  One hospitalization from 03/09-03/22/2021 for septic shock secondary to E. coli bacteremia due to an obstructive right-sided UV junction stone with hydronephrosis for which he underwent right-sided percutaneous nephrostomy tube placement followed by removal and right ureteral stent placement.   He did followup with Urology on 04/15 and had the stent removed.  He has chronic indwelling Cardona catheter.  He had another admission from 05/01-05/04/2021 when he presented acute hypoxic respiratory failure.  He was found to have COVID-19 infection.  It seems that he did receive his first immunization shot for COVID-19 a while ago, but unfortunately he missed his second dose.  While in the hospital, he was started on remdesivir and dexamethasone as per protocol.  He was also found to have abnormal UA.  He was on IV ceftriaxone in the hospital.  His urine culture was  pending at the time of discharge.  He was discharged on p.o. ciprofloxacin.  His urine culture came back positive for enterococcus faecalis, pansensitive.  He was discharged to transitional care unit.  Based on his urine culture, ciprofloxacin was discontinued and he was started on Macrobid.    It seems that while in TCU his dysphagia diet was initially upgraded from DD1 to DD3 with nectar-thickened liquids.  Apparently, as per the notes, he started having some shortness of breath and cough, was noted to have increased O2 needs.  Apparently a chest x-ray done in TCU showed new patchy basilar and right upper lobe pulmonary opacities suspicious for pneumonic infiltrates.  There is suspected mild pulmonary edema as well.  He was started on Levaquin in the TCU and because of concern for further deterioration he was sent to the ER for further evaluation.    I saw the patient in the ER.  His voice was very hoarse.  This is different from last hospitalization when I admitted him as well.  He reported that his breathing status was close to baseline.  He denies any chest pain.  He denies having fevers, but did report feeling cold.  He denies any headache, no nausea, no vomiting.  He denies abdominal pain, no diarrhea.  He does report feeling mildly constipated.     In the ER, he was seen by Marine Morrell PA-C.  His vitals in the ER showed blood pressure 104/74, his O2 saturation dropped to 89% on room air, it did improve to 97% on 4 L nasal cannula.  His heart rate was 90.  T-max in ER 99.4.  He did have VBG done in the ER, which showed pH 7.36, pCO2 of 6, pO2 of 14.  His BMP showed sodium 136, potassium 4.1, chloride 101, bicarb 32, BUN 27.  His calcium was 8.3, anion gap 3, albumin 2, total protein 7.2, total bilirubin 0.5, alkaline phosphatase 88, ALT 37, AST 35, lactic acid 1.7. Troponin less than 0.015.  CBC shows white blood cells 8.3, hemoglobin 11.3, hematocrit 37.5 and platelet count 273.  D-dimer was 1.4.  He had  CT of the chest with IV contrast, which did not show any evidence of pulmonary embolism.  It did show some hazy, ill-defined nodular granular infiltrates with interstitial infiltrates in both lungs, greater in the lower lobe, most compatible with pneumonia, no pleural effusion.  There is moderate to marked centric lobular emphysema.  There is no significant urinary collecting system dilation or calculi, but there is minimal periurethral edema on the right extending towards the right hilum which can be seen with infectious or inflammatory etiologies associated with urinary collecting system.    In the ER, he was given 1 dose of IV Zosyn and hospitalist service was called regarding admission.    PAST MEDICAL HISTORY:  1.  History of CVA with residual left-sided weakness.  2.  History of paroxysmal atrial fibrillation.  3.  COPD.  4.  History of obstructive right-sided ureteral stone with hydronephrosis status post percutaneous nephrostomy tube placement with subsequent removal and ureteral stent placement that was removed as well.  5.  Chronic urinary retention with chronic indwelling catheter.  6.  History of septic shock secondary to E. coli bacteremia in 03/2021.  7.  Chronic dysphagia.  8.  Depression.  9.  Recent COVID-19 infection.  He was admitted to [facility] from 05/01-05/04/2021.    PAST SURGICAL HISTORY:    Past Surgical History:   Procedure Laterality Date     APPENDECTOMY OPEN       ARTHROSCOPY SHOULDER ROTATOR CUFF REPAIR       cataracts Bilateral      CHOLECYSTECTOMY       COLONOSCOPY       CYSTOSCOPY  10/19/2011    Procedure:CYSTOSCOPY; CYSTOSCOPY, BLADDER STONE REMOVAL; Surgeon:ROB SAWYER; Location: OR     CYSTOSCOPY, TRANSURETHRAL RESECTION (TUR) PROSTATE, COMBINED N/A 2/21/2018    Procedure: COMBINED CYSTOSCOPY, TRANSURETHRAL RESECTION (TUR) PROSTATE;  COMBINED CYSTOSCOPY, TRANSURETHRAL RESECTION (TUR) PROSTATE ;  Surgeon: Rob Sawyer MD;  Location:  OR     EP LOOP  RECORDER IMPLANT N/A 1/20/2020    Procedure: EP Loop Recorder Implant;  Surgeon: Evgeny Parisi MD;  Location:  HEART CARDIAC CATH LAB     EYE SURGERY      right lid surgery      IR NEPHROSTOMY TUBE PLACEMENT RIGHT  3/9/2021     IR URETERAL STENT PLACEMENT RIGHT  3/16/2021     JOINT REPLACEMENT Right     HIP     KNEE SURGERY Bilateral      LAMINECTOMY LUMBAR ONE LEVEL       LASER HOLMIUM LITHOTRIPSY URETER(S), INSERT STENT, COMBINED Right 4/14/2021    Procedure: CYSTOSCOPY, BLADDER STONE REMOVAL, RIGHT URETEROSCOPY, HOLMIUM LASER LITHOTRIPSY, AND RIGHT STENT REMOVAL, RIGHT RETROGRADE;  Surgeon: Rob Sullivan MD;  Location:  OR     TONSILLECTOMY         SOCIAL HISTORY:  He used to smoke, he quit smoking many years ago.  He denies alcohol drinking.  He denies recent drug abuse.    FAMILY HISTORY:    Family History     Problem (# of Occurrences) Relation (Name,Age of Onset)    Prostate Cancer (1) Father          PRIOR TO ADMISSION MEDICATIONS:    acetaminophen (TYLENOL) 325 MG tablet Take 650 mg by mouth every 4 hours as needed for mild pain as needed for pain/fever Max dose 3000mg/24hr   Yes Reported, Patient   allopurinol (ZYLOPRIM) 300 MG tablet Take 300 mg by mouth daily 5/10/2021 at Unknown time Yes Unknown, Entered By History   aspirin (ASA) 325 MG EC tablet Take 1 tablet (325 mg) by mouth daily 5/10/2021 at Unknown time Yes Sandro Maradiaga MD   atorvastatin (LIPITOR) 10 MG tablet Take 10 mg by mouth daily 5/10/2021 at Unknown time Yes Unknown, Entered By History   Emollient (AMLACTIN ULTRA EX) Apply topically daily as needed TO FEET   Yes Reported, Patient   ipratropium-albuterol (COMBIVENT RESPIMAT)  MCG/ACT inhaler Inhale 1 puff into the lungs 4 times daily 5/10/2021 at 2000 Yes Reported, Patient   levofloxacin (LEVAQUIN) 750 MG tablet Take 750 mg by mouth daily 5/10/2021 at Unknown time Yes Unknown, Entered By History   metoprolol tartrate (LOPRESSOR) 25 MG tablet Take 0.5 tablets (12.5 mg) by  mouth 2 times daily 5/10/2021 at x2 Yes Romulo Cavanaugh MD   nitroFURantoin macrocrystal-monohydrate (MACROBID) 100 MG capsule Take 100 mg by mouth 2 times daily 5/10/2021 at x2 Yes Reported, Patient   PARoxetine (PAXIL) 20 MG tablet Take 20 mg by mouth daily 5/10/2021 at Unknown time Yes Unknown, Entered By History   polyethylene glycol (MIRALAX) 17 GM/Dose powder Take 17 g by mouth daily 5/10/2021 at Unknown time Yes Romulo Cavanaugh MD          ALLERGIES:  NO KNOWN DRUG ALLERGIES.    REVIEW OF SYSTEMS:  A 10-point review of systems was conducted and it was negative except for pertinent positives mentioned in history of present illness.    PHYSICAL EXAMINATION:  VITAL SIGNS:   Blood pressure 122/72, heart rate 90, respiratory rate 20, oxygen saturation 95% on 4L via nasal cannula, temperature 99.4.  GENERAL:  The patient is awake, alert.  He lies flat on the gurney, in no acute distress.  His voice is very soft/hoarse.  HEENT:  Head is normocephalic, atraumatic.  Pupils are equally round and reactive to light.  Oral mucosa is mildly dry.  NECK:  Supple.  No cervical lymphadenopathy. No thyromegaly.  CHEST:  There is bilateral air entry.  No wheezing, no rales, no crackles.  CARDIOVASCULAR:  Normal S1, S2, regular rate and rhythm.  No murmurs, no rubs.  ABDOMEN: Soft, obese, nontender.  Bowel sounds are present.  EXTREMITIES:  No leg swelling, calf tenderness.  Peripheral pulses are palpable.  SKIN:  Intact, no rash, no cyanosis.  NEUROLOGIC:  The patient is awake, alert, oriented to self, place and time.  There is residual left-sided weakness but no new focal neurologic deficits.  PSYCHIATRIC EVALUATION:  Normal mood, normal affect.  MUSCULOSKELETAL:  He does not have any obvious strength deformities.     LABORATORY DATA:   Labs were reviewed and dictated above.    ASSESSMENT AND PLAN:  Mr. Ron Gilmore is a pleasant 80-year-old gentleman with complex past medical history of cerebrovascular accident with  residual left-sided weakness, chronic urinary retention and chronic indwelling Cardona catheter, recent admission in 03/2021 for septic shock secondary to Escherichia coli bacteremia due to an obstructive right-sided ureterovesical junction stone status post nephrostomy tube placement and stent placement followed by removal, history of chronic obstructive pulmonary disease, depression, history of anemia, diabetes mellitus type 2 diet-controlled and recent COVID-19 infection who was sent from transitional care unit for evaluation of shortness of breath and worsening hypoxia.    PLAN:  1.  Acute on chronic hypoxic respiratory failure.  2.  Suspected aspiration pneumonia.   3.  Rule out healthcare-associated pneumonia.  4.  History of chronic obstructive pulmonary disease.  It seems that he had been on oxygen at home in the past.  Eventually he was weaned off oxygen.  He was recently admitted to [facility] in the beginning of May with COVID-19 infection.  He required supplemental oxygen at 4 liters at that time.  It seems that he was weaned down to 2 liters.  On 05/04/2021 he was discharged to TCU.  Apparently he was noted to have worsening shortness of breath and cough in the last few days.  He does have history of chronic dysphagia.  He was discharged to TCU on dysphagia diet 1 with nectar-thick liquids but apparently that was upgraded to dysphagia diet 3 with nectar-thick liquids.  With his worsening shortness of breath and cough the last few days there was a concern for aspiration pneumonia.  He had a chest x-ray done in TCU which showed new patchy left basilar and right upper lobe pulmonary opacities suspicious for pneumonic infiltrates.  He was started on Levaquin in TCU and next day he was sent to the ER.  In the ER he had CT of the chest with contrast, which was negative for PE, but it did confirm hazy, ill-defined nodular granular infiltrates with interstitial infiltrates in both lungs, greater  in the lower lobes, most compatible with pneumonia.  He was saturating 88% in the ER.  His oxygen saturation improved to 96% on 4 liters.  T-max in the ER was 99.4.  He does not have leukocytosis.  He was given 1 dose of Zosyn in the ER to cover for aspiration pneumonia.  Will plan to continue Zosyn at this time.  Given his multiple recent hospitalizations, will start him on vancomycin as well to broaden antibiotic coverage for possible healthcare-associated pneumonia.  Will keep him n.p.o. for now until speech evaluation done in the morning.  Will monitor fever curve and white blood cell trend.  Tylenol and Robitussin p.r.n. have been ordered.  Incentive spirometer has been ordered.  Will continue his prior to admission Combivent inhaler 4 times daily.     5.  Suspected urinary tract infection in the setting of chronic indwelling Cardona catheter.  The patient had recent hospitalization for septic shock secondary to E. coli bacteremia related to infected obstructive right-sided UVJ stone.  He underwent right-sided percutaneous nephrostomy by IR that was subsequently removed and then he had a stent placed that was removed as well by Urology.  During his prior hospitalization his UA done in the ER was suggestive of UTI.  He was started on IV ceftriaxone upon admission and he was discharged empirically on ciprofloxacin.  Urine culture was pending at the time of discharge.  His urine culture at that time eventually came back positive for Enterococcus faecalis which was pan sensitive.  This was noticed by TCU nurse practitioner.  His antibiotic was switched to nitrofurantoin.  He had his Cardona catheter changed in the ER.  Urine sample from new catheter is again abnormal.  White blood cells 124, large leukocyte esterase.  Currently he is on Zosyn and vancomycin, which will cover for both Enterococcus species and Gram-negative bacteria.  CT of the abdomen and pelvis showed minimal periureteral edema on the right extending  toward the right renal hilum, which can be seen with infectious or inflammatory etiologies.  We will continue broad spectrum antibiotic and followup urine cultures and adjust antibiotics as needed.    6.  Recent COVID-19 infection. He was admitted to [facility] from 05/01-05/04/2021.  He was tested positive for COVID-19.  He did receive his first immunization shot in the past, but unfortunately he missed his second dose.  While he was in the hospital, he was treated with IV remdesivir and Decadron.  As per discharge summary, it seems that given partial immunization it is felt that he is low infectious risk and it was recommended quarantine standard for 10 days.  Today would have been his last day of quarantine.  We will keep him on special precautions for now and will check in the morning with Infection Control about isolation status.    7.  History of hypertension.  8.  Dyslipidemia.  9.  History of CVA with residual left-sided hemiparesis.    Will resume his prior to admission aspirin, Lopressor and Lipitor.    10.  Dysphagia.  It seems that he was discharged on dysphagia diet 1 with nectar-thickened liquids, but this was upgraded to dysphagia diet 3 with nectar-thickened liquids as per TCU notes.  There is a concern for aspiration pneumonia.  The patient is placed n.p.o. at this time until Speech and Language Pathology will be consulted.    6.  History of depression.  Resume prior to admission Paxil.    7.  Diabetes.  His last hemoglobin A1c was 6 in 02/2021.  Apparently, he is not aware of diagnosis of diabetes mellitus.  His blood sugars now are 135.  We can check blood sugars every 4 hours while he is n.p.o. and if blood sugars remain reasonably controlled consider discontinuing Accu-Cheks.    8.  CODE STATUS:  Discussed with the patient.  The patient wishes to be FULL CODE.  9.  DVT prophylaxis:  I have started him on Lovenox 40 mg every 24 hours.  10.  DISPOSITION:  Admitting the patient,  anticipate a couple of days of hospitalization.      Tiffani Samson MD        D: 2021   T: 2021   MT: [id number]    Name:     SHERI THORPE  MRN:      [id number]        Account:     [id number]   :      1940           Admitted:    05/10/2021       Document: [id number]

## 2021-05-11 NOTE — PROGRESS NOTES
"   05/11/21 1044   General Information   Onset of Illness/Injury or Date of Surgery 05/10/21   Referring Physician Tiffani Samson MD   Patient/Family Therapy Goal Statement (SLP) eat and orange magic cup   Pertinent History of Current Problem \"Ron Gilmore is a 80 year old male with history of septic shock secondary to urosepsis in March 2021, paroxysmal atrial fibrillation, COPD, and CVA who presents with shortness of breath. Per chart review, the patient was admitted to the hospital from 5/1-5/4 due to shortness of breath and vomiting and tested positive for Covid-19 on 5/1. He was treated with dexamethasone and remdesevir. In addition, he was noted to have a UTI and was started on ciprofloxacin upon discharge. Today, a Nurse Practicioner at the [facility] stated that he was \"declining rapidly,\" experiencing increased shortness of breath, cough, and pharyngitis. She reports that he required 3 L of oxygen to keep sats between 90-91%. In addition, she reports that an x-ray obtained today showed infiltrates and that there was some concern for potential aspiration, prompting her to alert EMS.\"   General Observations Pt alert and talking to family on phone   Past History of Dysphagia Pt well known to this dept since 2018 with chronic dysphagia related to CVAs. Recent repeat Video Swallow in 03/2021 finding moderate dysphagia with DD1 and honey thick liquids by spoon and progressed to discharge on DD2 and nectar thick liquids. Pt report (consistent with notes from TCU) that pt was upgraded to DD3, nectar thick liquids and ice chips. Pt did acknowledge occasional coughing and 'choking' with solids.    Type of Evaluation   Type of Evaluation Swallow Evaluation   Oral Motor   Oral Musculature generally intact   Mucosal Quality dry   Vocal Quality/Secretion Management (Oral Motor)   Vocal Quality (Oral Motor) harsh;hoarse   Comment, Vocal Quality/Secretion Management (Oral Motor) voice improved " after PO trials   General Swallowing Observations   Current Diet/Method of Nutritional Intake (General Swallowing Observations, NIS) NPO   Respiratory Support (General Swallowing Observations) nasal cannula   Swallowing Recommendations   Diet Consistency Recommendations dysphagia level 1 (pureed);nectar-thick liquids   Supervision Level for Intake close supervision needed   Mode of Delivery Recommendations bolus size, small;liquids via spoon only;no straws   Swallowing Maneuver Recommendations alternate food and liquid intake;double dry swallow   Monitoring/Assistance Required (Eating/Swallowing) stop eating activities when fatigue is present;monitor for cough or change in vocal quality with intake   Recommended Feeding/Eating Techniques (Swallow Eval) maintain upright sitting position for eating;maintain upright posture during/after eating for 30 minutes;provide assist with feeding;provide oral hygiene prior to intake   Medication Administration Recommendations, Swallowing (SLP) whole in applesauce   Comment, Swallowing Recommendations Pt alert and agreeable to sit fully upright for evaluation. 10oz of nectar thick liquids by spoon trialed with baseline delayed initiation and one instance of throat clearing. Similar with applesauce trial with increaed tongue residue. Pt not appropriate for semi-solids at this time.Extensive education provided to pt and stressed need to increase swallow strategies and precautions including sitting upright for intake (in a chair if pt is agreeable). Pt acknowledges and family has reported in the past that pt frequently eats/drinking while laying flat. Would also discontinue ice chips at this time d/t recurrent pneumonia, no concerns for dehydration with good intake of thickened liquids, and pt not indendent with free water protocol. Pt initially requested DD3 options, however, given additional education, pt reported that he is more weak and has been having issues with DD3 and TCU.  Repeat Video Swallow Study not indicated at this time and will increase use of swallow strategies and modified diet. Recommend: Dysphagia diet level 1 and nectar thick liquids by spoon (pt typically feeds himself, but is requesting assist at time_ meds whole in applesauce. Close supervision and encouragement to sit upright, take small bites and sips, double swallows with solids and alternate solids and liquids. Continue ST at TCU to further train swallow strategies and return to baseline when appropriate.    General Therapy Interventions   Planned Therapy Interventions Dysphagia Treatment   Dysphagia treatment Modified diet education;Instruction of safe swallow strategies   SLP Therapy Assessment/Plan   Criteria for Skilled Therapeutic Interventions Met (SLP Eval) yes   SLP Diagnosis Moderate dysphagia   Rehab Potential (SLP Eval) good, to achieve stated therapy goals   Therapy Frequency (SLP Eval) 5 times/wk   Therapy Plan Review/Discharge Plan (SLP)   Therapy Plan Review (SLP) evaluation/treatment results reviewed;care plan/treatment goals reviewed;risks/benefits reviewed;current/potential barriers reviewed;participants voiced agreement with care plan;participants included;patient;caregiver   SLP Discharge Planning    SLP Discharge Recommendation (DC Rec) home with home care speech therapy   SLP Rationale for DC Rec Pt below baseline swallow function. Continue SLP with Home Health vs return to TCU pending family's ability to care for pt and manage dysphagia strategies   SLP Brief overview of current status  Dysphagia diet level 1 and nectar thick liquids by spoon (pt typically feeds himself, but is requesting assist at time_ meds whole in applesauce. Close supervision and encouragement to sit upright, take small bites and sips, double swallows with solids and alternate solids and liquids.     Total Evaluation Time   Total Evaluation Time (Minutes) 25

## 2021-05-11 NOTE — PROGRESS NOTES
Patient alert and oriented x4. Repeated expressed desires to go home - instructed pt about what interventions need completion before he can go, pt agreeable.  VSS. 2L NC. LS coarse. SLP cleared for nectar thick liquids. Tolerated magic cup and boost.   Chronic chirinos. Up to chair with mechanical lift assistance.     Daughter and wife updated x3 today.   COVID cleared by ID.

## 2021-05-11 NOTE — PLAN OF CARE
[id number] A&Ox4, denies pain. Febrile at 100.1 with relief from tylenol. Tele SR/ST. Lungs diminished with expiratory wheezes. Assist back to bed using lift, turned. Plan to continue antibiotics, wean O2.

## 2021-05-11 NOTE — TELEPHONE ENCOUNTER
"Staff nurse called to report COVID + patient with change in condition today. She feels he is declining \"quite rapidly\". CXR ordered by primary NP today indicates bilat LL & upper RL infiltrates. Patient requiring 3 L O2 to keep sats at 90-91%. He is tachycardic, BP hovering on lower end: 93/53. Afebrile however given Tylenol ~3 hrs ago. He was started on Levaquin & received 1st dose 1 hr ago. Staff nurse wondering if he should be sent back to hospital.    Orders:  1. Ok to send to ED    LAUREN Suh  5/10/21      "

## 2021-05-11 NOTE — ED TRIAGE NOTES
Pt coming from rehab facility recovering from covid. Pt. Has a decrease in sats today and xrays show infiltrates. Pt. Has a hoarse voice, alert asking for ice chips.

## 2021-05-11 NOTE — PROGRESS NOTES
[facility]    Hospitalist Interim Progress Note      Please see admission H&P per Dr. Samson from earlier this morning.    Since that time cleared by ST to start p.o.'s, DD 1/nectar thick due to high aspiration risk.  Continues on Zosyn, serial decrease in oxygen requirements from 4 L on admission to 2 L nasal cannula.  UA with moderate pyuria, on Zosyn as above, follow-up UC/S.  Patient was diagnosed with Covid 5/1/2021, completed dexamethasone and remdesivir, had been compliant to quarantine, nursing discussed with ID prevention and is clear of isolation.    RONALDO Saavedra MD       Internal Medicine   [facility]  [phone]

## 2021-05-11 NOTE — PROGRESS NOTES
RECEIVING UNIT ED HANDOFF REVIEW    ED Nurse Handoff Report was reviewed by: Graham Diaz RN on May 11, 2021 at 2:27 AM

## 2021-05-11 NOTE — PLAN OF CARE
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 Tongue deviation to L  Cardiac: SR  Resp: on 3L NC  GI: Bowel sounds active, no BM during shift  : Cardona with adequate output  CMS: Numbness BLE, generalized trace edema  Mobility: A2lift  Pain: Denies  Diet: NPO besides meds   Plan: Continue IV abx    /72 (BP Location: Left arm)   Pulse 93   Temp 99.4  F (37.4  C) (Oral)   Resp 20   SpO2 95%

## 2021-05-11 NOTE — ED NOTES
[facility]  ED Nurse Handoff Report    ED Chief complaint: Covid Concern, Shortness of Breath, and Pharyngitis      ED Diagnosis:   Final diagnoses:   Acute respiratory failure with hypoxia (H)   Pneumonia of both lungs due to infectious organism, unspecified part of lung       Code Status: Full Code    Allergies: No Known Allergies    Patient Story: Pt coming from rehab facility recovering from covid. Pt. Has a decrease in sats today and xrays show infiltrates. Pt. Has a hoarse voice, alert asking for ice chips  Focused Assessment:  Shortness of breath. Hx covid. O2 at 4 liters per NC    Treatments and/or interventions provided: see MAR  Patient's response to treatments and/or interventions:     To be done/followed up on inpatient unit:      Does this patient have any cognitive concerns?: alert and oriented    Activity level - Baseline/Home:  Stand with assist x2  Activity Level - Current:   Stand with assist x2    Patient's Preferred language: English   Needed?: No    Isolation: None and COVID r/o and special precautions  Infection: Not Applicable  COVID r/o and special precautions  Patient tested for COVID 19 prior to admission: YES  Bariatric?: No    Vital Signs:   Vitals:    05/10/21 2116 05/10/21 2117 05/10/21 2125 05/10/21 2130   BP:       Pulse:       Resp:       Temp:       TempSrc:       SpO2: (!) 89% (!) 88% 97% 95%       Cardiac Rhythm:     Was the PSS-3 completed:   Yes  What interventions are required if any?               Family Comments:   OBS brochure/video discussed/provided to patient/family: Yes              Name of person given brochure if not patient:               Relationship to patient:     For the majority of the shift this patient's behavior was Green.   Behavioral interventions performed were .    ED NURSE PHONE NUMBER: [phone]

## 2021-05-11 NOTE — PLAN OF CARE
SLP: Clinical Swallow Evaluation: Pt well known to this dept since 2018 with chronic dysphagia related to CVAs. Recent repeat Video Swallow in 03/2021 finding moderate dysphagia with DD1 and honey thick liquids by spoon and progressed to discharge on DD2 and nectar thick liquids. Pt report (consistent with notes from TCU) that pt was upgraded to DD3, nectar thick liquids and ice chips. Pt did acknowledge occasional coughing and 'choking' with solids.     Pt alert and agreeable to sit fully upright for evaluation. 10oz of nectar thick liquids by spoon trialed with baseline delayed initiation and one instance of throat clearing. Similar with applesauce trial with increaed tongue residue. Pt not appropriate for semi-solids at this time.     Extensive education provided to pt and stressed need to increase swallow strategies and precautions including sitting upright for intake (in a chair if pt is agreeable). Pt acknowledges and family has reported in the past that pt frequently eats/drinking while laying flat. Would also discontinue ice chips at this time d/t recurrent pneumonia, no concerns for dehydration with good intake of thickened liquids, and pt not indendent with free water protocol. Pt initially requested DD3 options, however, given additional education, pt reported that he is more weak and has been having issues with DD3 and TCU. Repeat Video Swallow Study not indicated at this time and will increase use of swallow strategies and modified diet.     Recommend: Dysphagia diet level 1 and nectar thick liquids by spoon (pt typically feeds himself, but is requesting assist at time_ meds whole in applesauce. Close supervision and encouragement to sit upright, take small bites and sips, double swallows with solids and alternate solids and liquids. Continue ST at TCU to further train swallow strategies and return to baseline when appropriate.

## 2021-05-11 NOTE — ED NOTES
Bed: ED13  Expected date: 5/10/21  Expected time: 8:35 PM  Means of arrival: Ambulance  Comments:  [facility] 535 pneumonia; COVID+

## 2021-05-11 NOTE — ED PROVIDER NOTES
Emergency Department Attending Supervision Note  5/11/2021  12:04 AM      I evaluated this patient in conjunction with Marine Morrell PA-C      Briefly, Ron Gilmore is a 80 year old male with history of septic shock secondary to urosepsis in March 2021, paroxysmal atrial fibrillation, COPD, CVA, and COVID-19 on 5/1 that required hospital admission.Today, a home care nurse was concerned that he had increased hypoxia as well as increased work of breathing.  There is also concern on chest radiograph for aspiration.      On my exam, patient is afebrile, low-grade hypoxia 4 L via nasal cannula.  In general, he appears chronically ill, generally weak and short of breath.  He was sleeping, but awakened easily, and was alert and able to answer questions.  He has distant lung sounds.  It is normal rate regular rhythm no murmurs rubs or gallops.  Abdomen is soft, but he has a left lower quadrant abdominal tenderness.  Calves are nontender.    Results:  Labs Ordered and Resulted from Time of ED Arrival Up to the Time of Departure from the ED   CBC WITH PLATELETS DIFFERENTIAL - Abnormal; Notable for the following components:       Result Value    RBC Count 4.31 (*)     Hemoglobin 11.3 (*)     Hematocrit 37.5 (*)     MCH 26.2 (*)     MCHC 30.1 (*)     RDW 16.4 (*)     All other components within normal limits   BLOOD GAS VENOUS - Abnormal; Notable for the following components:    PCO2 Venous 61 (*)     PO2 Venous 14 (*)     Bicarbonate Venous 34 (*)     All other components within normal limits   COMPREHENSIVE METABOLIC PANEL - Abnormal; Notable for the following components:    Glucose 135 (*)     Calcium 8.3 (*)     Albumin 2.0 (*)     All other components within normal limits   D DIMER QUANTITATIVE - Abnormal; Notable for the following components:    D Dimer 1.4 (*)     All other components within normal limits   LACTIC ACID WHOLE BLOOD   TROPONIN I   UA MACROSCOPIC WITH REFLEX TO MICRO AND CULTURE   PERIPHERAL IV CATHETER    BLOOD CULTURE   BLOOD CULTURE     CT Chest (PE) Abdomen Pelvis w Contrast   Final Result   IMPRESSION:   1.  Allowing for respiratory motion artifact, there is no definitive evidence for pulmonary embolism.      2.  Hazy ill-defined nodular alveolar infiltrates with interstitial infiltrates in both lungs greater in the lower lobe most compatible with pneumonia. No pleural fluid.      3.  Moderate to marked centrilobular emphysema.      4.  No significant urinary collecting system dilatation or calculi. Minimal periureteral edema on the right extending toward the right renal hilum which can be seen with infectious or inflammatory etiologies associated with the urinary collecting system.    Symmetric contrast enhancement to both kidneys without evidence for renal abscess.      5.  Remainder of findings as detailed above.      XR Chest Port 1 View   Final Result   IMPRESSION: Stable left-sided implanted device. Low lung volumes. New   patchy left basilar and right upper lobe pulmonary opacities   suspicious for pneumonic infiltrates. No definite pleural effusion.   Stable heart size and central vascular prominence. Suspected mild   pulmonary edema. Stable upper mediastinal soft tissue prominence   likely related to prominent upper mediastinal fat as seen on   comparison CT from 6/13/2017.      RUDY GALEANA MD          ED course:  The patient was treated for pneumonia with Zosyn.  This will also cover potential UTI.  My impression is 80-year-old male with multiple medical problems including CVA, recurrent UTI, recent COVID-19 infection, who presents with increased hypoxia and increased work of breathing.  Imaging studies demonstrate pneumonia versus aspiration.  He will be admitted for continued respiratory support and IV antibiotics pending cultures.      Diagnosis    ICD-10-CM    1. Acute respiratory failure with hypoxia (H)  J96.01 D dimer quantitative   2. Pneumonia of both lungs due to infectious organism,  unspecified part of lung  J18.9        MD Priya Chicas, Corinna Barndon MD  05/11/21 0619

## 2021-05-12 LAB
ANION GAP SERPL CALCULATED.3IONS-SCNC: 3 MMOL/L (ref 3–14)
BUN SERPL-MCNC: 26 MG/DL (ref 7–30)
CALCIUM SERPL-MCNC: 8.2 MG/DL (ref 8.5–10.1)
CHLORIDE SERPL-SCNC: 108 MMOL/L (ref 94–109)
CO2 SERPL-SCNC: 30 MMOL/L (ref 20–32)
CREAT SERPL-MCNC: 0.78 MG/DL (ref 0.66–1.25)
ERYTHROCYTE [DISTWIDTH] IN BLOOD BY AUTOMATED COUNT: 16.6 % (ref 10–15)
GFR SERPL CREATININE-BSD FRML MDRD: 86 ML/MIN/{1.73_M2}
GLUCOSE BLDC GLUCOMTR-MCNC: 107 MG/DL (ref 70–99)
GLUCOSE BLDC GLUCOMTR-MCNC: 132 MG/DL (ref 70–99)
GLUCOSE BLDC GLUCOMTR-MCNC: 148 MG/DL (ref 70–99)
GLUCOSE BLDC GLUCOMTR-MCNC: 85 MG/DL (ref 70–99)
GLUCOSE BLDC GLUCOMTR-MCNC: 85 MG/DL (ref 70–99)
GLUCOSE BLDC GLUCOMTR-MCNC: 98 MG/DL (ref 70–99)
GLUCOSE SERPL-MCNC: 99 MG/DL (ref 70–99)
HCT VFR BLD AUTO: 34.2 % (ref 40–53)
HGB BLD-MCNC: 10 G/DL (ref 13.3–17.7)
MCH RBC QN AUTO: 26 PG (ref 26.5–33)
MCHC RBC AUTO-ENTMCNC: 29.2 G/DL (ref 31.5–36.5)
MCV RBC AUTO: 89 FL (ref 78–100)
MRSA DNA SPEC QL NAA+PROBE: NEGATIVE
PLATELET # BLD AUTO: 161 10E9/L (ref 150–450)
POTASSIUM SERPL-SCNC: 3.8 MMOL/L (ref 3.4–5.3)
RBC # BLD AUTO: 3.85 10E12/L (ref 4.4–5.9)
SODIUM SERPL-SCNC: 141 MMOL/L (ref 133–144)
SPECIMEN SOURCE: NORMAL
WBC # BLD AUTO: 6.5 10E9/L (ref 4–11)

## 2021-05-12 PROCEDURE — 87640 STAPH A DNA AMP PROBE: CPT | Performed by: HOSPITALIST

## 2021-05-12 PROCEDURE — 87641 MR-STAPH DNA AMP PROBE: CPT | Performed by: HOSPITALIST

## 2021-05-12 PROCEDURE — 999N001017 HC STATISTIC GLUCOSE BY METER IP

## 2021-05-12 PROCEDURE — 120N000001 HC R&B MED SURG/OB

## 2021-05-12 PROCEDURE — 258N000003 HC RX IP 258 OP 636: Performed by: INTERNAL MEDICINE

## 2021-05-12 PROCEDURE — 250N000011 HC RX IP 250 OP 636: Performed by: INTERNAL MEDICINE

## 2021-05-12 PROCEDURE — 80048 BASIC METABOLIC PNL TOTAL CA: CPT | Performed by: INTERNAL MEDICINE

## 2021-05-12 PROCEDURE — 99233 SBSQ HOSP IP/OBS HIGH 50: CPT | Performed by: HOSPITALIST

## 2021-05-12 PROCEDURE — 250N000013 HC RX MED GY IP 250 OP 250 PS 637: Performed by: INTERNAL MEDICINE

## 2021-05-12 PROCEDURE — 85027 COMPLETE CBC AUTOMATED: CPT | Performed by: INTERNAL MEDICINE

## 2021-05-12 PROCEDURE — 36415 COLL VENOUS BLD VENIPUNCTURE: CPT | Performed by: INTERNAL MEDICINE

## 2021-05-12 RX ADMIN — PIPERACILLIN SODIUM AND TAZOBACTAM SODIUM 3.38 G: 3; .375 INJECTION, POWDER, LYOPHILIZED, FOR SOLUTION INTRAVENOUS at 12:32

## 2021-05-12 RX ADMIN — SODIUM CHLORIDE, PRESERVATIVE FREE: 5 INJECTION INTRAVENOUS at 17:13

## 2021-05-12 RX ADMIN — VANCOMYCIN HYDROCHLORIDE 2000 MG: 5 INJECTION, POWDER, LYOPHILIZED, FOR SOLUTION INTRAVENOUS at 16:58

## 2021-05-12 RX ADMIN — ALLOPURINOL 300 MG: 300 TABLET ORAL at 09:17

## 2021-05-12 RX ADMIN — POLYETHYLENE GLYCOL 3350 17 G: 17 POWDER, FOR SOLUTION ORAL at 09:17

## 2021-05-12 RX ADMIN — PIPERACILLIN SODIUM AND TAZOBACTAM SODIUM 3.38 G: 3; .375 INJECTION, POWDER, LYOPHILIZED, FOR SOLUTION INTRAVENOUS at 22:01

## 2021-05-12 RX ADMIN — METOPROLOL TARTRATE 12.5 MG: 25 TABLET, FILM COATED ORAL at 09:17

## 2021-05-12 RX ADMIN — ENOXAPARIN SODIUM 40 MG: 40 INJECTION SUBCUTANEOUS at 09:17

## 2021-05-12 RX ADMIN — VANCOMYCIN HYDROCHLORIDE 2000 MG: 5 INJECTION, POWDER, LYOPHILIZED, FOR SOLUTION INTRAVENOUS at 04:27

## 2021-05-12 RX ADMIN — IPRATROPIUM BROMIDE AND ALBUTEROL 1 PUFF: 20; 100 SPRAY, METERED RESPIRATORY (INHALATION) at 12:35

## 2021-05-12 RX ADMIN — PIPERACILLIN SODIUM AND TAZOBACTAM SODIUM 3.38 G: 3; .375 INJECTION, POWDER, LYOPHILIZED, FOR SOLUTION INTRAVENOUS at 06:56

## 2021-05-12 RX ADMIN — PIPERACILLIN SODIUM AND TAZOBACTAM SODIUM 3.38 G: 3; .375 INJECTION, POWDER, LYOPHILIZED, FOR SOLUTION INTRAVENOUS at 00:38

## 2021-05-12 RX ADMIN — IPRATROPIUM BROMIDE AND ALBUTEROL 1 PUFF: 20; 100 SPRAY, METERED RESPIRATORY (INHALATION) at 09:34

## 2021-05-12 RX ADMIN — IPRATROPIUM BROMIDE AND ALBUTEROL 1 PUFF: 20; 100 SPRAY, METERED RESPIRATORY (INHALATION) at 18:09

## 2021-05-12 RX ADMIN — METOPROLOL TARTRATE 12.5 MG: 25 TABLET, FILM COATED ORAL at 20:57

## 2021-05-12 RX ADMIN — ATORVASTATIN CALCIUM 10 MG: 10 TABLET, FILM COATED ORAL at 09:17

## 2021-05-12 RX ADMIN — PAROXETINE HYDROCHLORIDE 20 MG: 20 TABLET, FILM COATED ORAL at 09:17

## 2021-05-12 RX ADMIN — IPRATROPIUM BROMIDE AND ALBUTEROL 1 PUFF: 20; 100 SPRAY, METERED RESPIRATORY (INHALATION) at 22:01

## 2021-05-12 RX ADMIN — IPRATROPIUM BROMIDE AND ALBUTEROL 1 PUFF: 20; 100 SPRAY, METERED RESPIRATORY (INHALATION) at 09:00

## 2021-05-12 RX ADMIN — ASPIRIN 325 MG: 325 TABLET, COATED ORAL at 09:17

## 2021-05-12 ASSESSMENT — ACTIVITIES OF DAILY LIVING (ADL)
ADLS_ACUITY_SCORE: 26
ADLS_ACUITY_SCORE: 26
ADLS_ACUITY_SCORE: 27
ADLS_ACUITY_SCORE: 26

## 2021-05-12 NOTE — PLAN OF CARE
Pt A&Ox4, very hoarse voice and slurred speech, tends to whisper. VSS on 2L NC ex htn. Denies pain. LS coarse with intermittent dry cough. BS active. Chronic chirinos. Left side hemiplegia from previous CVA. T/Rq2h. Tele SR. On Dd1 [place] thick diet. Takes pills whole in apple sauce. Covid recovered, on contact iso for VRE in urine. X2 PIV infusing with intermittent abx. BS active. CMS intact. [facility] and patient transferred up to  at 2130.

## 2021-05-12 NOTE — PROGRESS NOTES
Vitals stable. Oxygen on at 2 liters. Alert and oriented. Some forgetfulness apparent. Tolerating DD1 with nectar thick liquids. Denies pain. Cardona draining dark allan urine. Left sided weakness-arm and leg elevated on pillows.No cough noted. Turned and repositioned. Stable-no respiratory distress.

## 2021-05-12 NOTE — PROGRESS NOTES
[facility]    Hospitalist Progress Note      Assessment & Plan   Ron Gilmore is a 80 year old male was admitted on 5/10/2021 planning with shortness of breath and hypoxia from TCU.    . Acute on chronic hypoxic respiratory failure.  Suspected aspiration pneumonia.   Rule out healthcare-associated pneumonia.  History of chronic obstructive pulmonary disease.  It seems that he had been on oxygen at home in the past.  Eventually he was weaned off oxygen.  He was recently admitted to [facility] in the beginning of May with COVID-19 infection.  He required supplemental oxygen at 4 liters at that time weaned down to 2 liters.  On 05/04/2021 he was discharged to TCU on dysphagia diet 1 with nectar-thick liquids but apparently that was upgraded to dysphagia diet 3 with nectar-thick liquids.  With his worsening shortness of breath and cough the last few days there was a concern for aspiration pneumonia.  He had a chest x-ray done in TCU which showed new patchy left basilar and right upper lobe pulmonary opacities suspicious for pneumonic infiltrates.  He was started on Levaquin in TCU and next day he was sent to the ER.  In the ER he had CT of the chest with contrast, which was negative for PE, but it did confirm hazy, ill-defined nodular granular infiltrates with interstitial infiltrates in both lungs, greater in the lower lobes, most compatible with pneumonia.   In  ER was 99.4.   -18 hospitalist started also on Vanco for healthcare associated pneumonia, continue Zosyn.  Will check nares for MRSA if negative DC Vanco  -NPO, ST restarted DD 1, nectar thick.  Tolerating, needs assistance.  Poor appetite.  Continue IVF until adequate p.o.'s.    Continue his prior to admission Combivent inhaler 4 times daily.     Suspected urinary tract infection in the setting of chronic indwelling Cardona catheter.  The patient had recent hospitalization for septic shock secondary to E. coli  bacteremia related to infected obstructive right-sided UVJ stone.  He underwent right-sided percutaneous nephrostomy by IR that was subsequently removed and then he had a stent placed that was removed as well by Urology.    Hospitalization UC Enterococcus faecalis which was pan sensitive.    In TCU switched to nitrofurantoin.  He had his Cardona catheter changed in the ER.  Urine sample from new catheter is again abnormal.  White blood cells 124, large leukocyte esterase.  Currently he is on Zosyn and vancomycin, which will cover for both Enterococcus species and Gram-negative bacteria.  CT of the abdomen and pelvis showed minimal periureteral edema on the right extending toward the right renal hilum, which can be seen with infectious or inflammatory etiologies.   -Continue IV antibiotics, monitor UC/S.     Recent COVID-19 infection.  Diagnosed 5/1/2021, received remdesivir and Decadron.  Quarantine for 10 days.  Per ID prevention okay now to DC isolation     History of hypertension.  Dyslipidemia.  History of CVA with residual left-sided hemiparesis.   resume his prior to admission aspirin, Lopressor and Lipitor.    Dysphagia.  Discharged on dysphagia diet 1 with nectar-thickened liquids, but this was upgraded to dysphagia diet 3 with nectar-thickened liquids as per TCU notes.  There is a concern for aspiration pneumonia.  -Pain by ST, restarted DD 1, nectar thick.  Needs assistance with feeds/monitor.     History of depression.  Resume prior to admission Paxil.    Diabetes.  His last hemoglobin A1c was 6 in 02/2021.  Patient unaware of diagnosis.  Glucoses WNL.    DVT Prophylaxis: Enoxaparin (Lovenox) subcutaneous q 24 hours  Code Status: Full Code  Expected discharge 2+ days pending clinical improvement aspiration pneumonia, adequate p.o. intake, safe disposition  Chandrika Saavedra MD  Text Page (7am - 6pm, M-F)    Interval History   Patient states breathing improved, poor appetite.  No nausea, emesis, abdominal  pain.  No cough, sputum, afebrile.      -Data reviewed today: I reviewed all new labs and imaging results over the last 24 hours.    Physical Exam   Temp: 97.7  F (36.5  C) Temp src: Oral BP: 110/67 Pulse: 104   Resp: 18 SpO2: 93 % O2 Device: Nasal cannula Oxygen Delivery: 2 LPM  Vitals:    05/11/21 0500 05/12/21 0634   Weight: 104.3 kg (230 lb) 111.4 kg (245 lb 11.2 oz)     Vital Signs with Ranges  Temp:  [97.7  F (36.5  C)-100.1  F (37.8  C)] 97.7  F (36.5  C)  Pulse:  [] 104  Resp:  [16-18] 18  BP: (110-154)/(67-91) 110/67  SpO2:  [92 %-94 %] 93 %  I/O last 3 completed shifts:  In: 738 [P.O.:738]  Out: 425 [Urine:425]    General/Constitutional:  No acute distress, alert, calm, cooperative  HEENT/Head Exam:  atraumatic  Eyes:  PERRL, no conjunctivits  Mouth/Oral Pharynx:  Buccal mucosa WNL  Chest/Respiratory: On 2 L O2 per nasal cannula air exchange bilateral lung fields; no rales or wheeze. Respiration nonlabored.  Cardiovascular: No murmur appreciated.  LE edema trace  Gastrointestinal/Abdomen: Obese, soft, nontender no rebound, guarding or other peritoneal signs.  Musculoskeletal:  extremities warm, dry, noncyanotic; no acitve synovitis.  Neurologic:  Gross motor testing nonfocal.  Sensation grossly tested intact.  Psychiatric:  Mental status:  Alert, oriented, affect calm  Skin:  No rash noted on gross exam    Medications     sodium chloride 75 mL/hr at 05/11/21 2141       allopurinol  300 mg Oral Daily     aspirin  325 mg Oral Daily     atorvastatin  10 mg Oral Daily     enoxaparin ANTICOAGULANT  40 mg Subcutaneous Q24H     insulin aspart  1-7 Units Subcutaneous TID AC     ipratropium-albuterol  1 puff Inhalation 4x Daily     metoprolol tartrate  12.5 mg Oral BID     PARoxetine  20 mg Oral Daily     piperacillin-tazobactam  3.375 g Intravenous Q6H     polyethylene glycol  17 g Oral Daily     sodium chloride (PF)  3 mL Intracatheter Q8H     vancomycin (VANCOCIN) IV  2,000 mg Intravenous Q12H         I  spent >35 minutes on the unit/floor in management of care today reviewing labs, medications, interdisciplinary notes; and completing documentation of encounter and orders with over 50% of my time was spent Counseling the Patient regarding patient needing for restricted diet, presenting with aspiration pneumonia/hypoxia and Coordinating Care and plan with Nursing re: dysphagia, aspiration ammonia, need for direct feed/surveillance; and need for nasal swab for MRSA to narrow antibiotics, management and surveillance.       Data   Recent Labs   Lab 05/12/21  0740 05/11/21  0545 05/11/21  0330 05/10/21  2110 05/10/21  0543   WBC 6.5  --   --  8.3 7.7   HGB 10.0*  --   --  11.3* 10.9*   MCV 89  --   --  87 87     --   --  173 173     --   --  136 134   POTASSIUM 3.8  --  4.2 4.1 4.1   CHLORIDE 108  --   --  101 99   CO2 30  --   --  32 31   BUN 26  --   --  27 21   CR 0.78 0.78  --  0.70 0.65*   ANIONGAP 3  --   --  3 4   RAJ 8.2*  --   --  8.3* 8.2*   GLC 99  --   --  135* 90   ALBUMIN  --   --   --  2.0*  --    PROTTOTAL  --   --   --  7.2  --    BILITOTAL  --   --   --  0.5  --    ALKPHOS  --   --   --  88  --    ALT  --   --   --  37  --    AST  --   --   --  35  --    TROPI  --   --   --  <0.015  --

## 2021-05-12 NOTE — PLAN OF CARE
Pt A/O x4, CMS intact, VSS on 2.5 LPM, unable to wean down through night. Denies pain, pt has a coarse voice, with intermittent dry cough. L. Sided hemiplegia from previous CVA. Tele SR. NS running 75 ml/hr. Pt has vanco and Zosyn for antibiotics. Dd1 nectar diet, tolerating well. Chronic cath in place, patent. Mepilex placed on coccyx for some scabbed skin. Pt hopes to continue home health care pending discharge. Continue to monitor.

## 2021-05-13 ENCOUNTER — APPOINTMENT (OUTPATIENT)
Dept: SPEECH THERAPY | Facility: CLINIC | Age: 81
DRG: 177 | End: 2021-05-13
Payer: COMMERCIAL

## 2021-05-13 LAB
ANION GAP SERPL CALCULATED.3IONS-SCNC: 5 MMOL/L (ref 3–14)
BACTERIA SPEC CULT: ABNORMAL
BACTERIA SPEC CULT: ABNORMAL
BUN SERPL-MCNC: 26 MG/DL (ref 7–30)
CALCIUM SERPL-MCNC: 8.1 MG/DL (ref 8.5–10.1)
CHLORIDE SERPL-SCNC: 111 MMOL/L (ref 94–109)
CO2 SERPL-SCNC: 27 MMOL/L (ref 20–32)
CREAT SERPL-MCNC: 1.32 MG/DL (ref 0.66–1.25)
GFR SERPL CREATININE-BSD FRML MDRD: 51 ML/MIN/{1.73_M2}
GLUCOSE BLDC GLUCOMTR-MCNC: 108 MG/DL (ref 70–99)
GLUCOSE BLDC GLUCOMTR-MCNC: 111 MG/DL (ref 70–99)
GLUCOSE BLDC GLUCOMTR-MCNC: 118 MG/DL (ref 70–99)
GLUCOSE BLDC GLUCOMTR-MCNC: 159 MG/DL (ref 70–99)
GLUCOSE BLDC GLUCOMTR-MCNC: 86 MG/DL (ref 70–99)
GLUCOSE SERPL-MCNC: 87 MG/DL (ref 70–99)
Lab: ABNORMAL
POTASSIUM SERPL-SCNC: 3.4 MMOL/L (ref 3.4–5.3)
SODIUM SERPL-SCNC: 143 MMOL/L (ref 133–144)
SPECIMEN SOURCE: ABNORMAL
VANCOMYCIN SERPL-MCNC: 37.6 MG/L

## 2021-05-13 PROCEDURE — 80048 BASIC METABOLIC PNL TOTAL CA: CPT | Performed by: HOSPITALIST

## 2021-05-13 PROCEDURE — 999N001017 HC STATISTIC GLUCOSE BY METER IP

## 2021-05-13 PROCEDURE — 80202 ASSAY OF VANCOMYCIN: CPT | Performed by: INTERNAL MEDICINE

## 2021-05-13 PROCEDURE — 92526 ORAL FUNCTION THERAPY: CPT | Mod: GN | Performed by: SPEECH-LANGUAGE PATHOLOGIST

## 2021-05-13 PROCEDURE — 250N000013 HC RX MED GY IP 250 OP 250 PS 637: Performed by: INTERNAL MEDICINE

## 2021-05-13 PROCEDURE — 250N000011 HC RX IP 250 OP 636: Performed by: INTERNAL MEDICINE

## 2021-05-13 PROCEDURE — 99232 SBSQ HOSP IP/OBS MODERATE 35: CPT | Performed by: HOSPITALIST

## 2021-05-13 PROCEDURE — 120N000001 HC R&B MED SURG/OB

## 2021-05-13 PROCEDURE — 258N000003 HC RX IP 258 OP 636: Performed by: INTERNAL MEDICINE

## 2021-05-13 PROCEDURE — 36415 COLL VENOUS BLD VENIPUNCTURE: CPT | Performed by: INTERNAL MEDICINE

## 2021-05-13 PROCEDURE — 36415 COLL VENOUS BLD VENIPUNCTURE: CPT | Performed by: HOSPITALIST

## 2021-05-13 RX ORDER — POTASSIUM CHLORIDE 1.5 G/1.58G
40 POWDER, FOR SOLUTION ORAL ONCE
Status: CANCELLED | OUTPATIENT
Start: 2021-05-13 | End: 2021-05-13

## 2021-05-13 RX ADMIN — ATORVASTATIN CALCIUM 10 MG: 10 TABLET, FILM COATED ORAL at 09:30

## 2021-05-13 RX ADMIN — SODIUM CHLORIDE, PRESERVATIVE FREE: 5 INJECTION INTRAVENOUS at 15:27

## 2021-05-13 RX ADMIN — ALLOPURINOL 300 MG: 300 TABLET ORAL at 09:30

## 2021-05-13 RX ADMIN — PIPERACILLIN SODIUM AND TAZOBACTAM SODIUM 3.38 G: 3; .375 INJECTION, POWDER, LYOPHILIZED, FOR SOLUTION INTRAVENOUS at 16:16

## 2021-05-13 RX ADMIN — IPRATROPIUM BROMIDE AND ALBUTEROL 1 PUFF: 20; 100 SPRAY, METERED RESPIRATORY (INHALATION) at 19:29

## 2021-05-13 RX ADMIN — IPRATROPIUM BROMIDE AND ALBUTEROL 1 PUFF: 20; 100 SPRAY, METERED RESPIRATORY (INHALATION) at 13:30

## 2021-05-13 RX ADMIN — PIPERACILLIN SODIUM AND TAZOBACTAM SODIUM 3.38 G: 3; .375 INJECTION, POWDER, LYOPHILIZED, FOR SOLUTION INTRAVENOUS at 21:13

## 2021-05-13 RX ADMIN — PAROXETINE HYDROCHLORIDE 20 MG: 20 TABLET, FILM COATED ORAL at 09:30

## 2021-05-13 RX ADMIN — PIPERACILLIN SODIUM AND TAZOBACTAM SODIUM 3.38 G: 3; .375 INJECTION, POWDER, LYOPHILIZED, FOR SOLUTION INTRAVENOUS at 03:57

## 2021-05-13 RX ADMIN — PIPERACILLIN SODIUM AND TAZOBACTAM SODIUM 3.38 G: 3; .375 INJECTION, POWDER, LYOPHILIZED, FOR SOLUTION INTRAVENOUS at 09:29

## 2021-05-13 RX ADMIN — METOPROLOL TARTRATE 12.5 MG: 25 TABLET, FILM COATED ORAL at 21:13

## 2021-05-13 RX ADMIN — ASPIRIN 325 MG: 325 TABLET, COATED ORAL at 09:30

## 2021-05-13 RX ADMIN — METOPROLOL TARTRATE 12.5 MG: 25 TABLET, FILM COATED ORAL at 09:30

## 2021-05-13 RX ADMIN — ENOXAPARIN SODIUM 40 MG: 40 INJECTION SUBCUTANEOUS at 09:30

## 2021-05-13 RX ADMIN — IPRATROPIUM BROMIDE AND ALBUTEROL 1 PUFF: 20; 100 SPRAY, METERED RESPIRATORY (INHALATION) at 09:31

## 2021-05-13 RX ADMIN — POLYETHYLENE GLYCOL 3350 17 G: 17 POWDER, FOR SOLUTION ORAL at 09:31

## 2021-05-13 RX ADMIN — SODIUM CHLORIDE, PRESERVATIVE FREE: 5 INJECTION INTRAVENOUS at 09:29

## 2021-05-13 RX ADMIN — IPRATROPIUM BROMIDE AND ALBUTEROL 1 PUFF: 20; 100 SPRAY, METERED RESPIRATORY (INHALATION) at 21:00

## 2021-05-13 ASSESSMENT — ACTIVITIES OF DAILY LIVING (ADL)
ADLS_ACUITY_SCORE: 26

## 2021-05-13 NOTE — PROGRESS NOTES
[facility]    Hospitalist Progress Note      Assessment & Plan   Ron Gilmore is a 80 year old male was admitted on 5/10/2021 planning with shortness of breath and hypoxia from TCU.    . Acute on chronic hypoxic respiratory failure.  Suspected aspiration pneumonia.   Rule out healthcare-associated pneumonia.  History of chronic obstructive pulmonary disease.  It seems that he had been on oxygen at home in the past.  Eventually he was weaned off oxygen.  He was recently admitted to [facility] in the beginning of May with COVID-19 infection.  He required supplemental oxygen at 4 liters at that time weaned down to 2 liters.  On 05/04/2021 he was discharged to TCU on dysphagia diet 1 with nectar-thick liquids but apparently that was upgraded to dysphagia diet 3 with nectar-thick liquids.  With his worsening shortness of breath and cough the last few days there was a concern for aspiration pneumonia.  He had a chest x-ray done in TCU which showed new patchy left basilar and right upper lobe pulmonary opacities suspicious for pneumonic infiltrates.  He was started on Levaquin in TCU and next day he was sent to the ER.  In the ER he had CT of the chest with contrast, which was negative for PE, but it did confirm hazy, ill-defined nodular granular infiltrates with interstitial infiltrates in both lungs, greater in the lower lobes, most compatible with pneumonia.   In  ER was 99.4.   -Prior hospitalist started also on Vanco for healthcare associated pneumonia, continue Zosyn.  Will check nares for MRSA if negative DC Vanco.  - ST restarted DD 1, nectar thick.  Tolerating, needs assistance.  Appetite improved, likes Magic cup and boost.  Continue IVF until adequate p.o.'s.    Continue his prior to admission Combivent inhaler 4 times daily.   -CR increased to 1.32 today, continues with IVF, increasing p.o.'s.  On Vanco, MRSA screen negative, will DC Vanco.  Encourage p.o.'s.   Recheck BMP in AM.  -Patient came from [facility] TCU at this time patient states he wants to go home.  Needs updated PT assessment for discharge planning.    Suspected urinary tract infection in the setting of chronic indwelling Cardona catheter.  The patient had recent hospitalization for septic shock secondary to E. coli bacteremia related to infected obstructive right-sided UVJ stone.  He underwent right-sided percutaneous nephrostomy by IR that was subsequently removed and then he had a stent placed that was removed as well by Urology.    Hospitalization UC Enterococcus faecalis which was pan sensitive.    In TCU switched to nitrofurantoin.  He had his Cardona catheter changed in the ER.  Urine sample from new catheter is again abnormal.  White blood cells 124, large leukocyte esterase.  Currently he is on Zosyn and vancomycin, which will cover for both Enterococcus species and Gram-negative bacteria.  CT of the abdomen and pelvis showed minimal periureteral edema on the right extending toward the right renal hilum, which can be seen with infectious or inflammatory etiologies.   -Continue IV antibiotics, UC returned 10-50 K Enterococcus faecalis, awaiting sensitivities.  History of VRE, in isolation..  Candida noted.     Recent COVID-19 infection.  Diagnosed 5/1/2021, received remdesivir and Decadron.  Quarantine for 10 days.  Per ID prevention okay now to DC isolation     History of hypertension.  Dyslipidemia.  History of CVA with residual left-sided hemiparesis.   resume his prior to admission aspirin, Lopressor and Lipitor.    Dysphagia.  Discharged on dysphagia diet 1 with nectar-thickened liquids, but this was upgraded to dysphagia diet 3 with nectar-thickened liquids as per TCU notes.  There is a concern for aspiration pneumonia.  -Pain by ST, restarted DD 1, nectar thick.  Needs surveillance with feeds/monitor.     History of depression.  Resume prior to admission Paxil.    Diabetes.  His last hemoglobin  A1c was 6 in 02/2021.  Patient unaware of diagnosis.  Glucoses WNL.    DVT Prophylaxis: Enoxaparin (Lovenox) subcutaneous q 24 hours  Code Status: Full Code  Expected discharge 2+ days pending clinical improvement aspiration pneumonia, adequate p.o. intake, safe disposition  Chandrika Saavedra MD  Text Page (7am - 6pm, M-F)    Interval History   Patient states breathing improved, poor appetite.  No nausea, emesis, abdominal pain.  No cough, sputum, afebrile.      -Data reviewed today: I reviewed all new labs and imaging results over the last 24 hours.    Physical Exam   Temp: 98.2  F (36.8  C) Temp src: Oral BP: 127/70 Pulse: 80   Resp: 18 SpO2: (!) 85 % O2 Device: None (Room air) Oxygen Delivery: 2 LPM  Vitals:    05/11/21 0500 05/12/21 0634   Weight: 104.3 kg (230 lb) 111.4 kg (245 lb 11.2 oz)     Vital Signs with Ranges  Temp:  [97.7  F (36.5  C)-98.2  F (36.8  C)] 98.2  F (36.8  C)  Pulse:  [] 80  Resp:  [18] 18  BP: (110-127)/(67-70) 127/70  SpO2:  [85 %-91 %] 85 %  I/O last 3 completed shifts:  In: 178 [P.O.:178]  Out: 1000 [Urine:1000]    General/Constitutional:  No acute distress, more alert, talkative, calm.  HEENT/Head Exam:  atraumatic  Eyes:  PERRL, no conjunctivits  Mouth/Oral Pharynx:  Buccal mucosa WNL  Chest/Respiratory: On 2 L O2 per nasal cannula air exchange bilateral lung fields; no rales or wheeze. Respiration nonlabored.  Cardiovascular: No murmur appreciated.  LE edema trace  Gastrointestinal/Abdomen: Obese, soft, nontender no rebound, guarding or other peritoneal signs.  Musculoskeletal:  extremities warm, dry, noncyanotic; no acitve synovitis.  Neurologic:  Gross motor testing nonfocal.  Sensation grossly tested intact.  Psychiatric:  Mental status:  Alert, oriented, affect calm.  Skin:  No rash noted on gross exam    Medications     sodium chloride 75 mL/hr at 05/13/21 0929       allopurinol  300 mg Oral Daily     aspirin  325 mg Oral Daily     atorvastatin  10 mg Oral Daily      enoxaparin ANTICOAGULANT  40 mg Subcutaneous Q24H     insulin aspart  1-7 Units Subcutaneous TID AC     ipratropium-albuterol  1 puff Inhalation 4x Daily     metoprolol tartrate  12.5 mg Oral BID     PARoxetine  20 mg Oral Daily     piperacillin-tazobactam  3.375 g Intravenous Q6H     polyethylene glycol  17 g Oral Daily     sodium chloride (PF)  3 mL Intracatheter Q8H           Data   Recent Labs   Lab 05/13/21  0814 05/12/21  0740 05/11/21  0545 05/11/21  0330 05/10/21  2110 05/10/21  0543   WBC  --  6.5  --   --  8.3 7.7   HGB  --  10.0*  --   --  11.3* 10.9*   MCV  --  89  --   --  87 87   PLT  --  161  --   --  173 173    141  --   --  136 134   POTASSIUM 3.4 3.8  --  4.2 4.1 4.1   CHLORIDE 111* 108  --   --  101 99   CO2 27 30  --   --  32 31   BUN 26 26  --   --  27 21   CR 1.32* 0.78 0.78  --  0.70 0.65*   ANIONGAP 5 3  --   --  3 4   RAJ 8.1* 8.2*  --   --  8.3* 8.2*   GLC 87 99  --   --  135* 90   ALBUMIN  --   --   --   --  2.0*  --    PROTTOTAL  --   --   --   --  7.2  --    BILITOTAL  --   --   --   --  0.5  --    ALKPHOS  --   --   --   --  88  --    ALT  --   --   --   --  37  --    AST  --   --   --   --  35  --    TROPI  --   --   --   --  <0.015  --

## 2021-05-13 NOTE — PLAN OF CARE
A&Ox4, calm & cooperative. VSS on 2 L O2 NC. Denies any pain or N/V. A2 w/ lift device, q 2 hr turn & repo. Tolerating Dd1 diet w/ nectar thick fluids. Chronic Cardona in place. Incontinent of BM x1 on shift.  CMS intact. PIV w/ NS @ 75 mL/hr. Mepilex to coccyx. Generalized +2 edema. Plan pending.

## 2021-05-13 NOTE — PLAN OF CARE
SLP: ST session this am with pt requesting boost/ensure shakes. Similar to previous admissions, pt drinking liquids, but primarily drinking protein shakes or magic cups. Pt able to cough up moderate amount of thick phlegm prior to PO intake.     Pt accepted trials of semi-solids with slow, but adequate oral phase, no overt s/sx of aspiration and oral residue clearing with liquid wash. Pt verbalized agreement with upgrade to dysphagia diet level 2 and nectar thick liquids by spoon only when pt is alert and sitting fully upright. 1:1 assist needed. **Likes all liquids very cold and cold foods (ice cream). This is pt's baseline diet and pt appears to be at his baseline, chronic dysphagia function. Similar to previous admissions, intake has been limited by reported lack of appetite and being down about being away from home/foods made by family. Encourage pt to have family bring in foods (though they typically puree all foods - pizza, pasta etc.)    Lengthy discussion with pt having several questions re: dispo. Okay for SLP to discharge back home as his family has expressed in previous admissions being independent in managing thickened liquids, modified diet and use of swallow strategies. Per report, his family is unable to care for him until after 5/15. Encouraged pt to discuss with his MD and family the discharge plan. Okay to discharge home from SLP perspective.

## 2021-05-13 NOTE — PROGRESS NOTES
Care Management Follow Up    Length of Stay (days): 2    Expected Discharge Date: 05/15/21     Concerns to be Addressed:       Patient plan of care discussed at interdisciplinary rounds: Yes    Anticipated Discharge Disposition:       Anticipated Discharge Services:    Anticipated Discharge DME:      Patient/family educated on Medicare website which has current facility and service quality ratings:    Education Provided on the Discharge Plan:    Patient/Family in Agreement with the Plan:      Referrals Placed by CM/SW:    Private pay costs discussed: Not applicable    Additional Information:  [facility]. [facility] confirmed that patient does have a bed hold.       TIFFANY Bartholomew

## 2021-05-13 NOTE — PROGRESS NOTES
Vitals stable. Denies pain. Offered to use lift and to sit in chair but declined. Turned and repositioned. Diet advanced tp DD2 with nectar thickened .Appetite fair. Total feed. Cardona with allan urine. Small amount stool x 1. Stable- no breathing distress.

## 2021-05-13 NOTE — CONSULTS
Care Management Initial Consult    General Information  Assessment completed with: VM-chart review, Other,    Type of CM/SW Visit: Initial Assessment    Primary Care Provider verified and updated as needed: Yes   Readmission within the last 30 days:        Reason for Consult: discharge planning  Advance Care Planning:            Communication Assessment  Patient's communication style: spoken language (English or Bilingual)    Hearing Difficulty or Deaf: no   Wear Glasses or Blind: yes    Cognitive  Cognitive/Neuro/Behavioral: WDL  Level of Consciousness: alert  Arousal Level: opens eyes spontaneously  Orientation: oriented x 4  Mood/Behavior: calm, cooperative  Best Language: 0 - No aphasia  Speech: clear, logical    Living Environment:   People in home: spouse  Yuli  Current living Arrangements: apartment      Able to return to prior arrangements:         Family/Social Support:  Care provided by: self, spouse/significant other  Provides care for: no one  Marital Status:   Wife  Yuli       Description of Support System: Supportive         Current Resources:   Patient receiving home care services:       Community Resources:    Equipment currently used at home: wheelchair, power  Supplies currently used at home:      Employment/Financial:  Employment Status: retired        Financial Concerns: No concerns identified           Lifestyle & Psychosocial Needs:        Socioeconomic History     Marital status:      Spouse name: Not on file     Number of children: Not on file     Years of education: Not on file     Highest education level: Not on file     Tobacco Use     Smoking status: Former Smoker     Smokeless tobacco: Never Used   Substance and Sexual Activity     Alcohol use: No     Comment: none for 29 yrs     Drug use: No       Functional Status:  Prior to admission patient needed assistance:              Mental Health Status:          Chemical Dependency Status:                 Values/Beliefs:  Spiritual, Cultural Beliefs, Restorationism Practices, Values that affect care:                 Additional Information:  Consult for discharge planning. Writer reviewed chart. Patient from [facility]. Call placed and confirmed that patient has a bed hold at [facility]. Will continue to follow for discharge planning.    TIFFANY Bartholomew

## 2021-05-13 NOTE — PHARMACY-VANCOMYCIN DOSING SERVICE
Pharmacy Vancomycin Initial Note  Date of Service May 13, 2021  Patient's  1940  80 year old, male    Indication: Healthcare-Associated Pneumonia    Current estimated CrCl = Estimated Creatinine Clearance: 100.6 mL/min (based on SCr of 0.78 mg/dL).    Creatinine for last 3 days  5/10/2021:  5:43 AM Creatinine 0.65 mg/dL;  9:10 PM Creatinine 0.70 mg/dL  2021:  5:45 AM Creatinine 0.78 mg/dL  2021:  7:40 AM Creatinine 0.78 mg/dL    Recent Vancomycin Level(s) for last 3 days  2021:  3:02 AM Vancomycin Level 37.6 mg/L      Vancomycin IV Administrations (past 72 hours)                   vancomycin 2000 mg in 0.9% NaCl 500 ml intermittent infusion 2,000 mg (mg) 2,000 mg New Bag 21 1658     2,000 mg New Bag  0427     2,000 mg New Bag 21 1624     2,000 mg New Bag  0411                Nephrotoxins and other renal medications (From now, onward)    Start     Dose/Rate Route Frequency Ordered Stop    21 1700  vancomycin 2000 mg in 0.9% NaCl 500 ml intermittent infusion 2,000 mg      2,000 mg  over 2 Hours Intravenous EVERY 24 HOURS 21 0334      21 0600  piperacillin-tazobactam (ZOSYN) 3.375 g vial to attach to  mL bag      3.375 g  over 30 Minutes Intravenous EVERY 6 HOURS 21 0257            Contrast Orders - past 72 hours (72h ago, onward)    Start     Dose/Rate Route Frequency Ordered Stop    21 0050  iopamidol (ISOVUE-370) solution 117 mL      117 mL Intravenous ONCE 21 0047 21 0102                Plan:  1. Start vancomycin  2000 mg IV q24h.   2. Vancomycin monitoring method: Trough (Method 1 = dosing nomogram)  3. Vancomycin therapeutic monitoring goal: 15-20 mg/L  4. Pharmacy will check vancomycin levels as appropriate in 1-3 Days.    5. Serum creatinine levels will be ordered daily for the first week of therapy and at least twice weekly for subsequent weeks.      Tristin Bernal [facility]

## 2021-05-14 LAB
ANION GAP SERPL CALCULATED.3IONS-SCNC: 4 MMOL/L (ref 3–14)
BUN SERPL-MCNC: 23 MG/DL (ref 7–30)
CALCIUM SERPL-MCNC: 8.1 MG/DL (ref 8.5–10.1)
CHLORIDE SERPL-SCNC: 113 MMOL/L (ref 94–109)
CO2 SERPL-SCNC: 27 MMOL/L (ref 20–32)
CREAT SERPL-MCNC: 1.26 MG/DL (ref 0.66–1.25)
GFR SERPL CREATININE-BSD FRML MDRD: 54 ML/MIN/{1.73_M2}
GLUCOSE BLDC GLUCOMTR-MCNC: 115 MG/DL (ref 70–99)
GLUCOSE BLDC GLUCOMTR-MCNC: 128 MG/DL (ref 70–99)
GLUCOSE BLDC GLUCOMTR-MCNC: 153 MG/DL (ref 70–99)
GLUCOSE BLDC GLUCOMTR-MCNC: 95 MG/DL (ref 70–99)
GLUCOSE BLDC GLUCOMTR-MCNC: 97 MG/DL (ref 70–99)
GLUCOSE SERPL-MCNC: 98 MG/DL (ref 70–99)
PLATELET # BLD AUTO: 176 10E9/L (ref 150–450)
POTASSIUM SERPL-SCNC: 3.3 MMOL/L (ref 3.4–5.3)
SODIUM SERPL-SCNC: 144 MMOL/L (ref 133–144)

## 2021-05-14 PROCEDURE — 99233 SBSQ HOSP IP/OBS HIGH 50: CPT | Performed by: HOSPITALIST

## 2021-05-14 PROCEDURE — 258N000003 HC RX IP 258 OP 636: Performed by: INTERNAL MEDICINE

## 2021-05-14 PROCEDURE — 85049 AUTOMATED PLATELET COUNT: CPT | Performed by: INTERNAL MEDICINE

## 2021-05-14 PROCEDURE — 250N000013 HC RX MED GY IP 250 OP 250 PS 637: Performed by: HOSPITALIST

## 2021-05-14 PROCEDURE — 250N000013 HC RX MED GY IP 250 OP 250 PS 637: Performed by: INTERNAL MEDICINE

## 2021-05-14 PROCEDURE — 120N000001 HC R&B MED SURG/OB

## 2021-05-14 PROCEDURE — 250N000011 HC RX IP 250 OP 636: Performed by: INTERNAL MEDICINE

## 2021-05-14 PROCEDURE — 80048 BASIC METABOLIC PNL TOTAL CA: CPT | Performed by: INTERNAL MEDICINE

## 2021-05-14 PROCEDURE — 999N001017 HC STATISTIC GLUCOSE BY METER IP

## 2021-05-14 PROCEDURE — 999N000147 HC STATISTIC PT IP EVAL DEFER: Performed by: PHYSICAL THERAPIST

## 2021-05-14 PROCEDURE — 36415 COLL VENOUS BLD VENIPUNCTURE: CPT | Performed by: INTERNAL MEDICINE

## 2021-05-14 RX ORDER — NITROFURANTOIN 25; 75 MG/1; MG/1
100 CAPSULE ORAL 2 TIMES DAILY
Status: DISCONTINUED | OUTPATIENT
Start: 2021-05-14 | End: 2021-05-15

## 2021-05-14 RX ORDER — POTASSIUM CHLORIDE 1.5 G/1.58G
40 POWDER, FOR SOLUTION ORAL ONCE
Status: COMPLETED | OUTPATIENT
Start: 2021-05-14 | End: 2021-05-14

## 2021-05-14 RX ORDER — POTASSIUM CHLORIDE 1.5 G/1.58G
20 POWDER, FOR SOLUTION ORAL 2 TIMES DAILY
Status: COMPLETED | OUTPATIENT
Start: 2021-05-14 | End: 2021-05-14

## 2021-05-14 RX ADMIN — ASPIRIN 325 MG: 325 TABLET, COATED ORAL at 09:02

## 2021-05-14 RX ADMIN — PIPERACILLIN SODIUM AND TAZOBACTAM SODIUM 3.38 G: 3; .375 INJECTION, POWDER, LYOPHILIZED, FOR SOLUTION INTRAVENOUS at 11:18

## 2021-05-14 RX ADMIN — POTASSIUM CHLORIDE 20 MEQ: 1.5 POWDER, FOR SOLUTION ORAL at 20:36

## 2021-05-14 RX ADMIN — POTASSIUM CHLORIDE 40 MEQ: 1.5 POWDER, FOR SOLUTION ORAL at 11:18

## 2021-05-14 RX ADMIN — IPRATROPIUM BROMIDE AND ALBUTEROL 1 PUFF: 20; 100 SPRAY, METERED RESPIRATORY (INHALATION) at 09:03

## 2021-05-14 RX ADMIN — SODIUM CHLORIDE, PRESERVATIVE FREE: 5 INJECTION INTRAVENOUS at 22:01

## 2021-05-14 RX ADMIN — POLYETHYLENE GLYCOL 3350 17 G: 17 POWDER, FOR SOLUTION ORAL at 09:02

## 2021-05-14 RX ADMIN — PAROXETINE HYDROCHLORIDE 20 MG: 20 TABLET, FILM COATED ORAL at 09:03

## 2021-05-14 RX ADMIN — IPRATROPIUM BROMIDE AND ALBUTEROL 1 PUFF: 20; 100 SPRAY, METERED RESPIRATORY (INHALATION) at 17:31

## 2021-05-14 RX ADMIN — METOPROLOL TARTRATE 12.5 MG: 25 TABLET, FILM COATED ORAL at 09:02

## 2021-05-14 RX ADMIN — ATORVASTATIN CALCIUM 10 MG: 10 TABLET, FILM COATED ORAL at 09:03

## 2021-05-14 RX ADMIN — METOPROLOL TARTRATE 12.5 MG: 25 TABLET, FILM COATED ORAL at 20:39

## 2021-05-14 RX ADMIN — POTASSIUM CHLORIDE 20 MEQ: 1.5 POWDER, FOR SOLUTION ORAL at 09:03

## 2021-05-14 RX ADMIN — PIPERACILLIN SODIUM AND TAZOBACTAM SODIUM 3.38 G: 3; .375 INJECTION, POWDER, LYOPHILIZED, FOR SOLUTION INTRAVENOUS at 04:57

## 2021-05-14 RX ADMIN — IPRATROPIUM BROMIDE AND ALBUTEROL 1 PUFF: 20; 100 SPRAY, METERED RESPIRATORY (INHALATION) at 12:53

## 2021-05-14 RX ADMIN — IPRATROPIUM BROMIDE AND ALBUTEROL 1 PUFF: 20; 100 SPRAY, METERED RESPIRATORY (INHALATION) at 22:21

## 2021-05-14 RX ADMIN — ALLOPURINOL 300 MG: 300 TABLET ORAL at 09:02

## 2021-05-14 RX ADMIN — ENOXAPARIN SODIUM 40 MG: 40 INJECTION SUBCUTANEOUS at 09:02

## 2021-05-14 RX ADMIN — NITROFURANTOIN MONOHYDRATE/MACROCRYSTALLINE 100 MG: 25; 75 CAPSULE ORAL at 20:37

## 2021-05-14 ASSESSMENT — ACTIVITIES OF DAILY LIVING (ADL)
ADLS_ACUITY_SCORE: 24

## 2021-05-14 NOTE — PLAN OF CARE
Pt is A/O. VSS on 2L O2 NC. Repo q2hrs. L-sided weakness. Cardona patent. Denies pain. Mepilex on coccyx. PT, OT and dietary consult. Will continue to monitor.

## 2021-05-14 NOTE — PLAN OF CARE
PT: Orders received and chart reviewed. Per discussion with patient, report of requiring aleks lift at baseline for transfers to/from wheelchair and is nonambulatory. Patient reports receiving assist for all ADLs. At baseline, pt lives with spouse and has assist from PCA 3-4 hours/day. No IP PT needs indicated at this time. Encouraged patient to transfer up to chair with assist of nursing and use of ceiling lift. Per discussion with CC, patient has bed hold at TCU. Plan for CC/SW to contact family to discuss discharge recommendations of returning to TCU vs home. Will complete PT orders.

## 2021-05-14 NOTE — CONSULTS
"CLINICAL NUTRITION SERVICES  -  ASSESSMENT NOTE    Recommendations Ordered by Registered Dietitian (RD):   - Reviewed diet order and highlighted protein-dense options on DD2 menu  - Listed appropriate supplement options   - Start Vital 500 daily @ 10 am; Ensure daily @ 2 pm (nectar thickened)  - Continue Magic Cup, change to with meals TID   Malnutrition:   % Weight Loss:  Weight loss does not meet criteria for malnutrition   % Intake:  </= 50% for >/= 1 month (severe malnutrition)  Subcutaneous Fat Loss:  Upper arm region mild depletion  Muscle Loss:  Clavicle bone region mild depletion, Acromion bone region mild depletion and Dorsal hand region mild depletion  Fluid Retention:  None noted    Malnutrition Diagnosis: Non-Severe malnutrition  In Context of:  Acute illness or injury  Chronic illness or disease     REASON FOR ASSESSMENT  Ron Gilmore is a 80 year old male seen by Registered Dietitian for Provider Order - patient request; NEEDS to stay DD1/thickened lliquids    NUTRITION HISTORY  - Information obtained from patient and chart review.   - Ron provides recent history of bouncing around from facility to facility (mostly here [facility] and [facility]). Hasn't been home for most of this year. Has been reliant on these facilities for his food. Because his appetite is poor has only been eating an estimate of 1/2 of one meal each day. He likes Magic Cup and Boost/Ensure.   - His appetite has been poor \"for years\". He clarified that ever since his stroke in 2020 he has not been eating well. Since his COVID diagnosis he has been eating even less.    - Recently admitted with sepsis r/t urosepsis in March 2021, also with history of COVID diagnosed on May 1, COPD, among others.       CURRENT NUTRITION ORDERS  Diet Order:     Dysphagia Diet Level 2 + Nectar Thick liquids  Magic cup BID between meals     Current Intake/Tolerance:  Consuming small amounts at a time.   Tolerating pears, yogurt, pudding. Dislikes meat per RN " documentation.     NUTRITION FOCUSED PHYSICAL ASSESSMENT FOR DIAGNOSING MALNUTRITION)  Yes            Observed:    Muscle wasting (refer to documentation in Malnutrition section) and Subcutaneous fat loss (refer to documentation in Malnutrition section)    Obtained from Chart/Interdisciplinary Team:  Last BM 5/14 (x3)    ANTHROPOMETRICS  Height: 6'  Weight: 104.3 kg (230 lb)   Body mass index is 31.2 kg/m .  Weight Status:  Obesity Grade I BMI 30-34.9  IBW: 80.9 kg   % IBW: 130%  Weight History: Patient suspects that his wt measured today is inaccurate. Recalls weighing in at 233# on admission and believes that he's likely even less than that now. He started at 280#.     In the past year or so pt is down at least 11%. Recent weight trending is difficult to assess, but suspect ongoing wt loss in the setting of poor oral intake.   Wt Readings from Last 20 Encounters:   05/14/21 111.6 kg (246 lb)   05/10/21 106 kg (233 lb 11.2 oz)   05/06/21 117.8 kg (259 lb 12.8 oz)   05/03/21 108.9 kg (240 lb 1.3 oz)   04/14/21 109.1 kg (240 lb 8 oz)   03/22/21 107 kg (235 lb 14.4 oz)   02/08/21 122.5 kg (270 lb)   03/12/20 119.6 kg (263 lb 11.2 oz)   03/10/20 118.1 kg (260 lb 6.4 oz)   03/09/20 117.9 kg (260 lb)   03/06/20 117.5 kg (259 lb)   03/02/20 119.3 kg (263 lb)   02/19/20 121.3 kg (267 lb 6.4 oz)   02/12/20 119.8 kg (264 lb 3.2 oz)   02/07/20 120.1 kg (264 lb 12.8 oz)   02/05/20 120.1 kg (264 lb 11.2 oz)   01/30/20 115.8 kg (255 lb 4.8 oz)   01/24/20 123.4 kg (272 lb)   01/23/20 123.4 kg (272 lb)   10/15/18 126.7 kg (279 lb 4.8 oz)       LABS  Labs reviewed  K 3.3 (L)  Cr 1.26 (H)    MEDICATIONS  Medications reviewed  Medium sliding scale insulin   Miralax scheduled - given today    ASSESSED NUTRITION NEEDS PER APPROVED PRACTICE GUIDELINES:  Dosing Weight 87 kg - adjusted   Estimated Energy Needs: 1670-4392 kcals (25-30 Kcal/Kg)  Justification: maintenance  Estimated Protein Needs: 104-130 grams protein (1.2-1.5 g  "pro/Kg)  Justification: preservation of lean body mass  Estimated Fluid Needs: 1 mL/kcal +  Justification: maintenance    MALNUTRITION:  % Weight Loss:  Weight loss does not meet criteria for malnutrition   % Intake:  </= 50% for >/= 1 month (severe malnutrition)  Subcutaneous Fat Loss:  Upper arm region mild depletion  Muscle Loss:  Clavicle bone region mild depletion, Acromion bone region mild depletion and Dorsal hand region mild depletion  Fluid Retention:  None noted    Malnutrition Diagnosis: Non-Severe malnutrition  In Context of:  Acute illness or injury  Chronic illness or disease    NUTRITION DIAGNOSIS:  Inadequate protein-energy intake related to poor appetite as evidenced by patient reports eating \"1/2 of 1 meal/day\", since admission x4 days tolerating <25% of meals received.       NUTRITION INTERVENTIONS  Recommendations / Nutrition Prescription  - Reviewed diet order and highlighted protein-dense options on DD2 menu  - Listed appropriate supplement options   - Start Vital 500 daily @ 10 am; Ensure daily @ 2 pm (nectar thickened)  - Continue Magic Cup, change to with meals TID      Implementation  Nutrition education: Provided education on see above  Medical Food Supplement: as above      Nutrition Goals  Intake of >25% of meals TID (or equivalent) + 2-3 supplements daily.       MONITORING AND EVALUATION:  Progress towards goals will be monitored and evaluated per protocol and Practice Guidelines    Debbie Harper RD, LD  [facility] [place], [address], 57, [facility]   Pager: [phone]  Weekend Pager: [phone]    "

## 2021-05-14 NOTE — PROGRESS NOTES
Care Management Follow Up    Length of Stay (days): 3    Expected Discharge Date: 05/15/21     Concerns to be Addressed: discharge planning     Patient plan of care discussed at interdisciplinary rounds: Yes    Anticipated Discharge Disposition: Transitional Care     Anticipated Discharge Services:    Anticipated Discharge DME:      Patient/family educated on Medicare website which has current facility and service quality ratings: yes  Education Provided on the Discharge Plan:    Patient/Family in Agreement with the Plan:      Referrals Placed by CM/SW: Post Acute Facilities  Private pay costs discussed: transportation costs    Additional Information:  Per MD Pt and Dtr. López is now wanting to return home. Prior to TCU stay Pt has been with family, had PCA services, home oxygen, lift and hospital bed.  MD wants to watch Pt's fluid intake, labs, and nutrition.  Pt's family is out of town for wedding and not returning till late Sunday night.     CC called and spoke with Pt's Dtr López.  She asked that I speak with Pt's spouse Yuli to discuss discharge plan.     Yuli voiced that they have thought of bringing Pt home.  They would need oxygen back in the home (had used [facility] before) but would be fine with any agency.    Would like [facility] RN/PT/OT  Would need ride arranged at discharge. Yuli voices they would need help getting him into bed as only grandson knows how to use lift and he may not be able to come.    Yuli then called back shortly after CC hung up and stated she had talked with other family and they  feel Pt should go back to TCU prior to coming home.  They are concerned with monitoring oxygen status and would prefer he had more time at TCU.    Aware that he may discharge prior to Monday back to TCU and they are ok with this plan.  MD updated by pawel Maurice, RN

## 2021-05-14 NOTE — PLAN OF CARE
Patient vital signs are at baseline: Yes  Patient able to ambulate as they were prior to admission or with assist devices provided by therapies during their stay:  Yes  Patient MUST void prior to discharge:  Yes  Patient able to tolerate oral intake:  Yes  Pain has adequate pain control using Oral analgesics:  Yes      AOx4, VSS -- on 2 Lpm oxygen, bed rest, denies pain at rest, turn and repo w/ assit of 2, unable to perform dorsiflexion and plantar. Nectar thick diet, apple juice x2, on IV abx. Tele NSR w/ BBB. Small BM x1, chirinos patent & draining clear yellow urine.

## 2021-05-14 NOTE — PLAN OF CARE
Pt refusing to get out of bed.  Pt alert and oriented x 4.  Cardona intact.  Pt on dysphagia diet 2 with nectar think.  Pt would like to see dietitan tomorrow.  [facility] but [facility].  Pt has hemiparesis on (L) LE & (L) UE.  Pt in contact iso for VRE.  On O2 @2L.  Lungs are diminished. K+ is 3.4

## 2021-05-14 NOTE — PROGRESS NOTES
[facility]    Hospitalist Progress Note      Assessment & Plan   Ron Gilmore is a 80 year old male was admitted on 5/10/2021 planning with shortness of breath and hypoxia from TCU.    . Acute on chronic hypoxic respiratory failure.  Suspected aspiration pneumonia.   Rule out healthcare-associated pneumonia.  History of chronic obstructive pulmonary disease.  Patient has been on oxygen at home in the past.  Eventually he was weaned off oxygen.  He was recently admitted to [facility] in the beginning of May with COVID-19 infection.  He required supplemental oxygen at 4 liters at that time weaned down to 2 liters.  On 05/04/2021 he was discharged to TCU on dysphagia diet 1 with nectar-thick liquids but apparently that was upgraded to dysphagia diet 3 with nectar-thick liquids.  With his worsening shortness of breath and cough the last few days there was a concern for aspiration pneumonia.  He had a chest x-ray done in TCU which showed new patchy left basilar and right upper lobe pulmonary opacities suspicious for pneumonic infiltrates.  He was started on Levaquin in TCU and next day he was sent to the ER.  In the ER he had CT of the chest with contrast, which was negative for PE, but it did confirm hazy, ill-defined nodular granular infiltrates with interstitial infiltrates in both lungs, greater in the lower lobes, most compatible with pneumonia.   In  ER was 99.4.   -Prior hospitalist started also on Vanco for healthcare associated pneumonia, continue Zosyn. nares for MRSA  negative DC Vanco.  - ST restarted DD 1, nectar thick.  Tolerating, needs assistance.  Appetite remains poor eating less than 25% of his meal tray calories and fluids.  Needs continued IVF.  Tell adequate p.o.'s.  No associated GI symptoms, he no nausea, emesis, abdominal pain; abdominal exam benign.   Continue his prior to admission Combivent inhaler 4 times daily.   -CR increased to 1.32 5/13/2021,  today 1.26.  Etiology unclear, no nephrotoxin, receiving IVF.  Be related to Vanco, discontinued. Encourage p.o.'s.  Recheck BMP in AM.      Suspected urinary tract infection in the setting of chronic indwelling Cardona catheter.  The patient had recent hospitalization for septic shock secondary to E. coli bacteremia related to infected obstructive right-sided UVJ stone.  He underwent right-sided percutaneous nephrostomy by IR that was subsequently removed and then he had a stent placed that was removed as well by Urology.    Hospitalization UC Enterococcus faecalis which was pan sensitive.    In TCU switched to nitrofurantoin.  He had his Cardona catheter changed in the ER.  Urine sample from new catheter is again abnormal.  White blood cells 124, large leukocyte esterase.  Currently he is on Zosyn and vancomycin, which will cover for both Enterococcus species and Gram-negative bacteria.  CT of the abdomen and pelvis showed minimal periureteral edema on the right extending toward the right renal hilum, which can be seen with infectious or inflammatory etiologies.   -Continue IV antibiotics, UC returned 10-50 K Enterococcus faecalis, Candida noted. History of VRE, in isolation.  Final sensitivities including nitrofurantoin, will transition off Zosyn..     Recent COVID-19 infection.  Diagnosed 5/1/2021, received remdesivir and Decadron.  Quarantine for 10 days.  Per ID prevention okay now to DC isolation     History of hypertension.  Dyslipidemia.  History of CVA with residual left-sided hemiparesis.   resume his prior to admission aspirin, Lopressor and Lipitor.    Dysphagia.  Discharged on dysphagia diet 1 with nectar-thickened liquids, but this was upgraded to dysphagia diet 3 with nectar-thickened liquids as per TCU notes.  Concern for recurrent aspiration with presentation aspiration pneumonia.  - ST, restarted DD 1, nectar thick.  Needs surveillance with feeds/monitor.  See #1     History of depression.  Resume  prior to admission Paxil.    Diabetes.  His last hemoglobin A1c was 6 in 02/2021.  Patient unaware of diagnosis.  Glucoses WNL.    Care plan.  Patient with total cares, PTA Terrie lift.  Admitted from [facility] TCU.  Patient has bed hold at [facility].  Discussed with Daughter López today who states that daughters including her self and wife will be out of town for a wedding this weekend.  She states niece would be available to care for patient however not 24/7.  Prior PCA not available since diagnosis of Covid.  At this time medical issues, as above.  Discharge plan planning discussed with .    DVT Prophylaxis: Enoxaparin (Lovenox) subcutaneous q 24 hours  Code Status: Full Code  Expected discharge 2+ days pending clinical improvement including adequate p.o.'s without IV support, renal function improvement off Vanco, safe disposition  Chandrika Saavedra MD  Text Page (7am - 6pm, M-F)    Interval History   Patient sleepy on morning encounters however arousable.  He reports no appetite.  Eating less than 25% of meal tray.  Continues IVF.  Denies abdominal pain, nausea, emesis.      -Data reviewed today: I reviewed all new labs and imaging results over the last 24 hours.    Physical Exam   Temp: 98.3  F (36.8  C) Temp src: Oral BP: (!) 149/87 Pulse: 86   Resp: 18 SpO2: 93 % O2 Device: Nasal cannula Oxygen Delivery: 2 LPM  Vitals:    05/11/21 0500 05/12/21 0634 05/14/21 0624   Weight: 104.3 kg (230 lb) 111.4 kg (245 lb 11.2 oz) 111.6 kg (246 lb)     Vital Signs with Ranges  Temp:  [97.9  F (36.6  C)-98.5  F (36.9  C)] 98.3  F (36.8  C)  Pulse:  [79-98] 86  Resp:  [16-18] 18  BP: (120-158)/(69-87) 149/87  SpO2:  [90 %-93 %] 93 %  I/O last 3 completed shifts:  In: 585 [P.O.:585]  Out: 1450 [Urine:1450]    General/Constitutional:  No acute distress, somnolent on morning encounter yet arousable.  Calm, cooperative.  HEENT/Head Exam:  atraumatic  Eyes:  PERRL, no conjunctivits  Mouth/Oral Pharynx:  Buccal  mucosa WNL  Chest/Respiratory: On 2 L O2 per nasal cannula air exchange bilateral lung fields; no rales or wheeze. Respiration nonlabored.  Cardiovascular: No murmur appreciated.  LE edema trace  Gastrointestinal/Abdomen: Obese, soft, nontender.  No rebound, guarding or other peritoneal signs.  Musculoskeletal:  extremities warm, dry, noncyanotic; no acitve synovitis.  Neurologic:  Gross motor testing nonfocal.  Sensation grossly tested intact.  Psychiatric:  Mental status:  Alert, oriented, affect sleepy  Skin:  No rash noted on gross exam    Medications     sodium chloride 75 mL/hr at 05/13/21 1527       allopurinol  300 mg Oral Daily     aspirin  325 mg Oral Daily     atorvastatin  10 mg Oral Daily     enoxaparin ANTICOAGULANT  40 mg Subcutaneous Q24H     insulin aspart  1-7 Units Subcutaneous TID AC     ipratropium-albuterol  1 puff Inhalation 4x Daily     metoprolol tartrate  12.5 mg Oral BID     PARoxetine  20 mg Oral Daily     piperacillin-tazobactam  3.375 g Intravenous Q6H     polyethylene glycol  17 g Oral Daily     potassium chloride  20 mEq Oral BID     sodium chloride (PF)  3 mL Intracatheter Q8H           Data   Recent Labs   Lab 05/14/21  0710 05/13/21  0814 05/12/21  0740 05/10/21  2110 05/10/21  2110 05/10/21  0543   WBC  --   --  6.5  --  8.3 7.7   HGB  --   --  10.0*  --  11.3* 10.9*   MCV  --   --  89  --  87 87     --  161  --  173 173    143 141  --  136 134   POTASSIUM 3.3* 3.4 3.8   < > 4.1 4.1   CHLORIDE 113* 111* 108  --  101 99   CO2 27 27 30  --  32 31   BUN 23 26 26  --  27 21   CR 1.26* 1.32* 0.78   < > 0.70 0.65*   ANIONGAP 4 5 3  --  3 4   RAJ 8.1* 8.1* 8.2*  --  8.3* 8.2*   GLC 98 87 99  --  135* 90   ALBUMIN  --   --   --   --  2.0*  --    PROTTOTAL  --   --   --   --  7.2  --    BILITOTAL  --   --   --   --  0.5  --    ALKPHOS  --   --   --   --  88  --    ALT  --   --   --   --  37  --    AST  --   --   --   --  35  --    TROPI  --   --   --   --  <0.015  --     < > =  values in this interval not displayed.

## 2021-05-15 ENCOUNTER — APPOINTMENT (OUTPATIENT)
Dept: GENERAL RADIOLOGY | Facility: CLINIC | Age: 81
DRG: 177 | End: 2021-05-15
Attending: HOSPITALIST
Payer: COMMERCIAL

## 2021-05-15 LAB
ANION GAP SERPL CALCULATED.3IONS-SCNC: 4 MMOL/L (ref 3–14)
BASOPHILS # BLD AUTO: 0 10E9/L (ref 0–0.2)
BASOPHILS NFR BLD AUTO: 0.3 %
BUN SERPL-MCNC: 19 MG/DL (ref 7–30)
CALCIUM SERPL-MCNC: 8.1 MG/DL (ref 8.5–10.1)
CHLORIDE SERPL-SCNC: 113 MMOL/L (ref 94–109)
CO2 SERPL-SCNC: 28 MMOL/L (ref 20–32)
CREAT SERPL-MCNC: 1.05 MG/DL (ref 0.66–1.25)
CRP SERPL-MCNC: 83.6 MG/L (ref 0–8)
DIFFERENTIAL METHOD BLD: ABNORMAL
EOSINOPHIL # BLD AUTO: 0.2 10E9/L (ref 0–0.7)
EOSINOPHIL NFR BLD AUTO: 2.2 %
ERYTHROCYTE [DISTWIDTH] IN BLOOD BY AUTOMATED COUNT: 16.7 % (ref 10–15)
FERRITIN SERPL-MCNC: 92 NG/ML (ref 26–388)
FOLATE SERPL-MCNC: 7.6 NG/ML
GFR SERPL CREATININE-BSD FRML MDRD: 67 ML/MIN/{1.73_M2}
GLUCOSE BLDC GLUCOMTR-MCNC: 103 MG/DL (ref 70–99)
GLUCOSE BLDC GLUCOMTR-MCNC: 115 MG/DL (ref 70–99)
GLUCOSE BLDC GLUCOMTR-MCNC: 125 MG/DL (ref 70–99)
GLUCOSE BLDC GLUCOMTR-MCNC: 94 MG/DL (ref 70–99)
GLUCOSE SERPL-MCNC: 88 MG/DL (ref 70–99)
HCT VFR BLD AUTO: 31.9 % (ref 40–53)
HGB BLD-MCNC: 9.4 G/DL (ref 13.3–17.7)
IMM GRANULOCYTES # BLD: 0 10E9/L (ref 0–0.4)
IMM GRANULOCYTES NFR BLD: 0.6 %
IRON SATN MFR SERPL: 9 % (ref 15–46)
IRON SERPL-MCNC: 24 UG/DL (ref 35–180)
LYMPHOCYTES # BLD AUTO: 1.2 10E9/L (ref 0.8–5.3)
LYMPHOCYTES NFR BLD AUTO: 16.3 %
MCH RBC QN AUTO: 26.2 PG (ref 26.5–33)
MCHC RBC AUTO-ENTMCNC: 29.5 G/DL (ref 31.5–36.5)
MCV RBC AUTO: 89 FL (ref 78–100)
MONOCYTES # BLD AUTO: 0.5 10E9/L (ref 0–1.3)
MONOCYTES NFR BLD AUTO: 6.7 %
NEUTROPHILS # BLD AUTO: 5.3 10E9/L (ref 1.6–8.3)
NEUTROPHILS NFR BLD AUTO: 73.9 %
NRBC # BLD AUTO: 0 10*3/UL
NRBC BLD AUTO-RTO: 0 /100
NT-PROBNP SERPL-MCNC: 4482 PG/ML (ref 0–1800)
PLATELET # BLD AUTO: 198 10E9/L (ref 150–450)
POTASSIUM SERPL-SCNC: 4 MMOL/L (ref 3.4–5.3)
PROCALCITONIN SERPL-MCNC: 0.05 NG/ML
RBC # BLD AUTO: 3.59 10E12/L (ref 4.4–5.9)
RETICS # AUTO: 44.3 10E9/L (ref 25–95)
RETICS/RBC NFR AUTO: 1.2 % (ref 0.5–2)
SODIUM SERPL-SCNC: 145 MMOL/L (ref 133–144)
TIBC SERPL-MCNC: 276 UG/DL (ref 240–430)
VIT B12 SERPL-MCNC: 208 PG/ML (ref 193–986)
WBC # BLD AUTO: 7.2 10E9/L (ref 4–11)

## 2021-05-15 PROCEDURE — 82607 VITAMIN B-12: CPT | Performed by: HOSPITALIST

## 2021-05-15 PROCEDURE — 83880 ASSAY OF NATRIURETIC PEPTIDE: CPT | Performed by: HOSPITALIST

## 2021-05-15 PROCEDURE — 250N000011 HC RX IP 250 OP 636: Performed by: INTERNAL MEDICINE

## 2021-05-15 PROCEDURE — 86140 C-REACTIVE PROTEIN: CPT | Performed by: HOSPITALIST

## 2021-05-15 PROCEDURE — 82746 ASSAY OF FOLIC ACID SERUM: CPT | Performed by: HOSPITALIST

## 2021-05-15 PROCEDURE — 80048 BASIC METABOLIC PNL TOTAL CA: CPT | Performed by: HOSPITALIST

## 2021-05-15 PROCEDURE — 36415 COLL VENOUS BLD VENIPUNCTURE: CPT | Performed by: HOSPITALIST

## 2021-05-15 PROCEDURE — 250N000013 HC RX MED GY IP 250 OP 250 PS 637: Performed by: INTERNAL MEDICINE

## 2021-05-15 PROCEDURE — 83540 ASSAY OF IRON: CPT | Performed by: HOSPITALIST

## 2021-05-15 PROCEDURE — 258N000003 HC RX IP 258 OP 636: Performed by: INTERNAL MEDICINE

## 2021-05-15 PROCEDURE — 99233 SBSQ HOSP IP/OBS HIGH 50: CPT | Performed by: HOSPITALIST

## 2021-05-15 PROCEDURE — 84145 PROCALCITONIN (PCT): CPT | Performed by: HOSPITALIST

## 2021-05-15 PROCEDURE — 85025 COMPLETE CBC W/AUTO DIFF WBC: CPT | Performed by: HOSPITALIST

## 2021-05-15 PROCEDURE — 85045 AUTOMATED RETICULOCYTE COUNT: CPT | Performed by: HOSPITALIST

## 2021-05-15 PROCEDURE — 83550 IRON BINDING TEST: CPT | Performed by: HOSPITALIST

## 2021-05-15 PROCEDURE — 999N001017 HC STATISTIC GLUCOSE BY METER IP

## 2021-05-15 PROCEDURE — 82728 ASSAY OF FERRITIN: CPT | Performed by: HOSPITALIST

## 2021-05-15 PROCEDURE — 250N000013 HC RX MED GY IP 250 OP 250 PS 637: Performed by: HOSPITALIST

## 2021-05-15 PROCEDURE — 120N000001 HC R&B MED SURG/OB

## 2021-05-15 PROCEDURE — 71045 X-RAY EXAM CHEST 1 VIEW: CPT

## 2021-05-15 RX ADMIN — NITROFURANTOIN MONOHYDRATE/MACROCRYSTALLINE 100 MG: 25; 75 CAPSULE ORAL at 09:13

## 2021-05-15 RX ADMIN — ASPIRIN 325 MG: 325 TABLET, COATED ORAL at 09:12

## 2021-05-15 RX ADMIN — ATORVASTATIN CALCIUM 10 MG: 10 TABLET, FILM COATED ORAL at 09:13

## 2021-05-15 RX ADMIN — IPRATROPIUM BROMIDE AND ALBUTEROL 1 PUFF: 20; 100 SPRAY, METERED RESPIRATORY (INHALATION) at 09:13

## 2021-05-15 RX ADMIN — ALLOPURINOL 300 MG: 300 TABLET ORAL at 09:13

## 2021-05-15 RX ADMIN — METOPROLOL TARTRATE 12.5 MG: 25 TABLET, FILM COATED ORAL at 21:25

## 2021-05-15 RX ADMIN — ENOXAPARIN SODIUM 40 MG: 40 INJECTION SUBCUTANEOUS at 09:11

## 2021-05-15 RX ADMIN — IPRATROPIUM BROMIDE AND ALBUTEROL 1 PUFF: 20; 100 SPRAY, METERED RESPIRATORY (INHALATION) at 13:52

## 2021-05-15 RX ADMIN — IPRATROPIUM BROMIDE AND ALBUTEROL 1 PUFF: 20; 100 SPRAY, METERED RESPIRATORY (INHALATION) at 21:25

## 2021-05-15 RX ADMIN — METOPROLOL TARTRATE 12.5 MG: 25 TABLET, FILM COATED ORAL at 09:13

## 2021-05-15 RX ADMIN — SODIUM CHLORIDE, PRESERVATIVE FREE: 5 INJECTION INTRAVENOUS at 14:48

## 2021-05-15 RX ADMIN — PAROXETINE HYDROCHLORIDE 20 MG: 20 TABLET, FILM COATED ORAL at 09:13

## 2021-05-15 RX ADMIN — IPRATROPIUM BROMIDE AND ALBUTEROL 1 PUFF: 20; 100 SPRAY, METERED RESPIRATORY (INHALATION) at 18:14

## 2021-05-15 ASSESSMENT — ACTIVITIES OF DAILY LIVING (ADL)
ADLS_ACUITY_SCORE: 24
ADLS_ACUITY_SCORE: 26

## 2021-05-15 NOTE — PROGRESS NOTES
[facility]    Medicine Progress Note - Hospitalist Service       Date of Admission:  5/10/2021  Assessment & Plan        Hypoxic acute respiratory failure +/- chronic respiratory failure   Bilateral interstitial infiltrates on chest X-ray   History of chronic obstructive lung disease   Sleep disordered breathing, non compliant with continuous positive airway pressure   Recent COVID-19 infection 5/1/2021  Chest X-ray is abnormal but he only had a couple of temperatures around 100 on 5/11.  Normal WBC.  He has received 5 days of Zosyn and also 1 day of nitrofurantoin for presumptive pneumonia and a urinary tract infection.  He is afebrile and on 3L nasal canula.  Apparently he had been on home oxygen in the past but not clear what he was on when he was admitted this time.  Chest X-ray is still abnormal.  C-reactive protein 80, pro-calcitonin 0.05 and BNP 4500.    Continue inhaled bronchodilators     Continue supplemental oxygen     Cardiac evaluation     Chronic urinary catheter   History of kidney stones   Enterococcus catheter related urinary tract infection present on admission    Completed Zosyn    Catheter changed     Stop nitrofurantoin     Elevated BNP  Hypertension   Intermittent atrial fibrillation - not on anticoagulation     Transthoracic echocardiogram     Stop intravenous fluid     Continue metoprolol     Unless contraindicated he should be on anticoagulation     Hyperlipidemia     Continue statin     Dysphagia, ?chronic     Continue modified diet     Depression     Continue paroxetine    Type 2 diabetes mellitus   Hemoglobin A1C   Date Value Ref Range Status   05/11/2021 6.0 (H) 0 - 5.6 % Final     Comment:     Normal <5.7% Prediabetes 5.7-6.4%  Diabetes 6.5% or higher - adopted from ADA   consensus guidelines.       Recent Labs   Lab 05/15/21  1336 05/15/21  0703 05/15/21  0156 05/14/21  2134 05/14/21  1718 05/14/21  1141 05/14/21  0710 05/14/21  0620 05/13/21  0814  05/13/21  0814 05/12/21  0740 05/12/21  0740 05/10/21  2110 05/10/21  2110 05/10/21  0543   GLC  --  88  --   --   --   --  98  --   --  87  --  99  --  135* 90   *  --  103* 128* 153* 115*  --  97   < >  --    < >  --    < >  --   --     < > = values in this interval not displayed.     Continue dietary control     History of stroke in 2006 and 2020    Continue aspirin and statin     Consider anticoagulation for atrial fibrillation     Acute on chronic anemia   Recent low folate level     Recheck iron, B12 and folate     Fecal occult blood     Non severe malnutrition   % Weight Loss:  Weight loss does not meet criteria for malnutrition   % Intake:  </= 50% for >/= 1 month (severe malnutrition)  Subcutaneous Fat Loss:  Upper arm region mild depletion  Muscle Loss:  Clavicle bone region mild depletion, Acromion bone region mild depletion and Dorsal hand region mild depletion  Fluid Retention:  None noted     Diet: Combination Diet Dysphagia Diet Level 2: Mechan Altered; Nectar Thickened Liquids (pre-thickened or use instant food thickener) (by spoon)  Snacks/Supplements Adult: Other; 10 am Vital 500; 2 pm Ensure (nectar thick); Between Meals  Snacks/Supplements Adult: Magic Cup; With Meals    DVT Prophylaxis: Enoxaparin (Lovenox) SQ  Cardona Catheter: in place, indication: Neurogenic Bladder  Code Status: Full Code         Disposition Plan   Expected discharge: Tomorrow, recommended to transitional care unit once resp status is stable .  Entered: Barron Duque MD 05/15/2021, 1:41 PM       The patient's care was discussed with the Bedside Nurse and Patient.    Barron Duque MD  Hospitalist Service  [facility]  Contact information available via [facility] Paging/Directory    ______________________________________________________________________    Interval History   Stable overnight but still 90% oxygen saturation on 3L nasal canula.  No complaints.      Data reviewed today: I  reviewed all medications, new labs and imaging results over the last 24 hours. I personally reviewed no images or EKG's today.    Physical Exam   Vital Signs: Temp: 98.2  F (36.8  C) Temp src: Axillary BP: (!) 151/89 Pulse: 90   Resp: 18 SpO2: 91 % O2 Device: Nasal cannula Oxygen Delivery: 3 LPM  Weight: 252 lbs 3.2 oz  Constitutional: awake, alert, cooperative, no apparent distress  Respiratory: No increased work of breathing, no wheezing, rales or rhonchi heard  Cardiovascular: regular rate and rhythm, normal S1 and S2, no S3 or S4, and no murmur noted  GI:normal bowel sounds, soft, non-distended, non-tender  Skin: no rashes  Neurologic: Awake, alert, oriented to name, place and time.  Cranial nerves II-XII are grossly intact.  Motor is 5 out of 5 bilaterally.  Neuropsychiatric: General: normal, calm and normal eye contact    Data   Recent Labs   Lab 05/15/21  1155 05/15/21  0703 05/14/21  0710 05/13/21  0814 05/12/21  0740 05/10/21  2110 05/10/21  2110   WBC 7.2  --   --   --  6.5  --  8.3   HGB 9.4*  --   --   --  10.0*  --  11.3*   MCV 89  --   --   --  89  --  87     --  176  --  161  --  173   NA  --  145* 144 143 141  --  136   POTASSIUM  --  4.0 3.3* 3.4 3.8   < > 4.1   CHLORIDE  --  113* 113* 111* 108  --  101   CO2  --  28 27 27 30  --  32   BUN  --  19 23 26 26  --  27   CR  --  1.05 1.26* 1.32* 0.78   < > 0.70   ANIONGAP  --  4 4 5 3  --  3   RAJ  --  8.1* 8.1* 8.1* 8.2*  --  8.3*   GLC  --  88 98 87 99  --  135*   ALBUMIN  --   --   --   --   --   --  2.0*   PROTTOTAL  --   --   --   --   --   --  7.2   BILITOTAL  --   --   --   --   --   --  0.5   ALKPHOS  --   --   --   --   --   --  88   ALT  --   --   --   --   --   --  37   AST  --   --   --   --   --   --  35   TROPI  --   --   --   --   --   --  <0.015    < > = values in this interval not displayed.     Recent Results (from the past 24 hour(s))   XR Chest Port 1 View    Narrative    CHEST ONE VIEW PORTABLE May 15, 2021 11:47 AM      HISTORY: Respiratory failure.    COMPARISON: 5/10/2021.      Impression    IMPRESSION: Shallow inspiration accentuates heart size and pulmonary  vascularity. Interstitial and airspace infiltrates bilaterally have  overall increased slightly since the previous exam. No pneumothorax.  Aortic calcification. Implantable device on the left is unchanged.    BRUNO PAYAN MD     Medications     sodium chloride 75 mL/hr at 05/14/21 2201       allopurinol  300 mg Oral Daily     aspirin  325 mg Oral Daily     atorvastatin  10 mg Oral Daily     enoxaparin ANTICOAGULANT  40 mg Subcutaneous Q24H     insulin aspart  1-7 Units Subcutaneous TID AC     ipratropium-albuterol  1 puff Inhalation 4x Daily     metoprolol tartrate  12.5 mg Oral BID     PARoxetine  20 mg Oral Daily     polyethylene glycol  17 g Oral Daily     sodium chloride (PF)  3 mL Intracatheter Q8H

## 2021-05-15 NOTE — PLAN OF CARE
Slept well at intervals thru evening. Po ok. Turns well with 1-2 assist. O2 3l keeps him at 92-93%. Mepilex removed from coccyx - was soiled from stool. Small scabbed area to coccyx. Refuses IS. Cardona intact. Lab unable to draw blood for potassium level- will try again in am.

## 2021-05-15 NOTE — PLAN OF CARE
Pt A&O X4, garbled and slow speech, baseline. VSS on 3L O2. Not OOB this shift, turn/repo. Cardona intact. 1 BM this shift. Denies pain.

## 2021-05-16 ENCOUNTER — APPOINTMENT (OUTPATIENT)
Dept: CARDIOLOGY | Facility: CLINIC | Age: 81
DRG: 177 | End: 2021-05-16
Attending: HOSPITALIST
Payer: COMMERCIAL

## 2021-05-16 ENCOUNTER — APPOINTMENT (OUTPATIENT)
Dept: SPEECH THERAPY | Facility: CLINIC | Age: 81
DRG: 177 | End: 2021-05-16
Payer: COMMERCIAL

## 2021-05-16 LAB
BACTERIA SPEC CULT: NO GROWTH
GLUCOSE BLDC GLUCOMTR-MCNC: 118 MG/DL (ref 70–99)
GLUCOSE BLDC GLUCOMTR-MCNC: 133 MG/DL (ref 70–99)
GLUCOSE BLDC GLUCOMTR-MCNC: 141 MG/DL (ref 70–99)
GLUCOSE BLDC GLUCOMTR-MCNC: 73 MG/DL (ref 70–99)
GLUCOSE BLDC GLUCOMTR-MCNC: 82 MG/DL (ref 70–99)
GLUCOSE BLDC GLUCOMTR-MCNC: 83 MG/DL (ref 70–99)
SPECIMEN SOURCE: NORMAL

## 2021-05-16 PROCEDURE — 92526 ORAL FUNCTION THERAPY: CPT | Mod: GN | Performed by: SPEECH-LANGUAGE PATHOLOGIST

## 2021-05-16 PROCEDURE — 99233 SBSQ HOSP IP/OBS HIGH 50: CPT | Performed by: HOSPITALIST

## 2021-05-16 PROCEDURE — 93306 TTE W/DOPPLER COMPLETE: CPT | Mod: 26 | Performed by: INTERNAL MEDICINE

## 2021-05-16 PROCEDURE — 999N000208 ECHOCARDIOGRAM COMPLETE

## 2021-05-16 PROCEDURE — 258N000003 HC RX IP 258 OP 636: Performed by: HOSPITALIST

## 2021-05-16 PROCEDURE — 120N000001 HC R&B MED SURG/OB

## 2021-05-16 PROCEDURE — 255N000002 HC RX 255 OP 636: Performed by: INTERNAL MEDICINE

## 2021-05-16 PROCEDURE — 999N001017 HC STATISTIC GLUCOSE BY METER IP

## 2021-05-16 PROCEDURE — 250N000013 HC RX MED GY IP 250 OP 250 PS 637: Performed by: INTERNAL MEDICINE

## 2021-05-16 PROCEDURE — 250N000011 HC RX IP 250 OP 636: Performed by: HOSPITALIST

## 2021-05-16 PROCEDURE — 250N000011 HC RX IP 250 OP 636: Performed by: INTERNAL MEDICINE

## 2021-05-16 RX ORDER — FUROSEMIDE 10 MG/ML
20 INJECTION INTRAMUSCULAR; INTRAVENOUS EVERY 12 HOURS
Status: DISCONTINUED | OUTPATIENT
Start: 2021-05-16 | End: 2021-05-17 | Stop reason: HOSPADM

## 2021-05-16 RX ORDER — CYANOCOBALAMIN 1000 UG/ML
1000 INJECTION, SOLUTION INTRAMUSCULAR; SUBCUTANEOUS ONCE
Status: COMPLETED | OUTPATIENT
Start: 2021-05-16 | End: 2021-05-16

## 2021-05-16 RX ADMIN — PAROXETINE HYDROCHLORIDE 20 MG: 20 TABLET, FILM COATED ORAL at 08:54

## 2021-05-16 RX ADMIN — FUROSEMIDE 20 MG: 10 INJECTION, SOLUTION INTRAVENOUS at 15:09

## 2021-05-16 RX ADMIN — CYANOCOBALAMIN 1000 MCG: 1000 INJECTION, SOLUTION INTRAMUSCULAR at 17:07

## 2021-05-16 RX ADMIN — IPRATROPIUM BROMIDE AND ALBUTEROL 1 PUFF: 20; 100 SPRAY, METERED RESPIRATORY (INHALATION) at 21:56

## 2021-05-16 RX ADMIN — IPRATROPIUM BROMIDE AND ALBUTEROL 1 PUFF: 20; 100 SPRAY, METERED RESPIRATORY (INHALATION) at 17:12

## 2021-05-16 RX ADMIN — IPRATROPIUM BROMIDE AND ALBUTEROL 1 PUFF: 20; 100 SPRAY, METERED RESPIRATORY (INHALATION) at 08:54

## 2021-05-16 RX ADMIN — POLYETHYLENE GLYCOL 3350 17 G: 17 POWDER, FOR SOLUTION ORAL at 08:54

## 2021-05-16 RX ADMIN — ATORVASTATIN CALCIUM 10 MG: 10 TABLET, FILM COATED ORAL at 08:54

## 2021-05-16 RX ADMIN — IRON SUCROSE 300 MG: 20 INJECTION, SOLUTION INTRAVENOUS at 15:09

## 2021-05-16 RX ADMIN — ENOXAPARIN SODIUM 40 MG: 40 INJECTION SUBCUTANEOUS at 08:54

## 2021-05-16 RX ADMIN — ASPIRIN 325 MG: 325 TABLET, COATED ORAL at 08:54

## 2021-05-16 RX ADMIN — IPRATROPIUM BROMIDE AND ALBUTEROL 1 PUFF: 20; 100 SPRAY, METERED RESPIRATORY (INHALATION) at 14:17

## 2021-05-16 RX ADMIN — ALLOPURINOL 300 MG: 300 TABLET ORAL at 08:53

## 2021-05-16 RX ADMIN — HUMAN ALBUMIN MICROSPHERES AND PERFLUTREN 9 ML: 10; .22 INJECTION, SOLUTION INTRAVENOUS at 13:53

## 2021-05-16 RX ADMIN — METOPROLOL TARTRATE 12.5 MG: 25 TABLET, FILM COATED ORAL at 21:56

## 2021-05-16 RX ADMIN — METOPROLOL TARTRATE 12.5 MG: 25 TABLET, FILM COATED ORAL at 08:53

## 2021-05-16 ASSESSMENT — ACTIVITIES OF DAILY LIVING (ADL)
ADLS_ACUITY_SCORE: 26

## 2021-05-16 NOTE — PLAN OF CARE
5/15/21 3248-2127  Pt A&Ox4, very hoarse voice and tends to whisper. VSS on 3L NC. Denies pain. LS coarse. BS active. Chronic chirinos. Ax2-3 w/ lift. Left side hemiplegia from previous CVA. T&R q2h. Tele SR w/ intermittent Afib and BBB's. Diet: Dd2, Nectar thick. Takes pills whole in apple sauce. Covid recovered, on contact iso for VRE in urine. x2 PIV SL. BS active. CMS intact. IMPORTANT: still needs a stool sample.

## 2021-05-16 NOTE — PROGRESS NOTES
Care Management Follow Up    Length of Stay (days): 5    Expected Discharge Date: 05/16/21(tcu)     Concerns to be Addressed: discharge planning     Patient plan of care discussed at interdisciplinary rounds: Yes    Anticipated Discharge Disposition: Transitional Care     Anticipated Discharge Services:    Anticipated Discharge DME:      Patient/family educated on Medicare website which has current facility and service quality ratings: yes  Education Provided on the Discharge Plan:    Patient/Family in Agreement with the Plan:      Referrals Placed by CM/SW: Post Acute Facilities  Private pay costs discussed: Not applicable    Additional Information:    Per rounds pt is likely ready for discharge today.  Jonathan contacted [facility] and updated them on pt's likely arrival today with a 1600 stretcher transport ride.  Jonathan scheduled stretcher ride with [facility] for pt (initially at 1300 but rescheduled to 1600 due to not having orders in) b/c pt has history of CVA, left sided weakness, is nonambulatory and a aleks lift at baseline, and requires a ceiling life with nursing assistance for all transfers.  Pt cannot sit up in w/c independently.  Jonathan will fax orders once received.    Update:  Pt is not discharging.  Jonathan rescheduled stretcher transport ride with [facility] for tomorrow at 12pm with anticipation that pt will likely discharge. Jonathan updated [facility] that pt will likely discharge tomorrow with updated stretcher transport time.       Sally Desai, FREDDIESW

## 2021-05-16 NOTE — PLAN OF CARE
Pt is A/O. VSS,O2 NC 3L. Tele SR BBB . Cardona intact, draining. Poor appetite. Awaiting discharge to TCU today, Echo pending.

## 2021-05-16 NOTE — PROGRESS NOTES
[facility]    Medicine Progress Note - Hospitalist Service       Date of Admission:  5/10/2021  Assessment & Plan        Hypoxic acute respiratory failure +/- chronic respiratory failure   Bilateral interstitial infiltrates on chest X-ray- ? Aspiration pneumonia    History of chronic obstructive lung disease   Sleep disordered breathing, non compliant with continuous positive airway pressure   Recent COVID-19 infection 5/1/2021  Chest X-ray is abnormal but he only had a couple of temperatures around 100 on 5/11.  Normal WBC.  He has received 5 days of Zosyn and also 1 day of nitrofurantoin for presumptive pneumonia and a urinary tract infection.  He is afebrile and on 3L nasal canula.  Apparently he had been on home oxygen in the past but not clear what he was on when he was admitted this time.  Chest X-ray is still abnormal.  C-reactive protein 80, pro-calcitonin 0.05 and BNP 4500.    Continue inhaled bronchodilators     Continue supplemental oxygen     Cardiac evaluation - transthoracic echocardiogram not done     ADDENDUM  Transthoracic echocardiogram shows diastolic dysfunction. Will give intravenous Lasix today and observe response.    Chronic urinary catheter   History of kidney stones   Enterococcus catheter related urinary tract infection present on admission    Completed Zosyn    Catheter changed     Stopped nitrofurantoin     Elevated BNP  Hypertension   Intermittent atrial fibrillation - not on anticoagulation     Transthoracic echocardiogram not done yey    Stopped intravenous fluid     Continue metoprolol     Unless contraindicated he should be on anticoagulation     Hyperlipidemia     Continue statin     Dysphagia, ?chronic     Continue modified diet     Depression     Continue paroxetine    Type 2 diabetes mellitus   Hemoglobin A1C   Date Value Ref Range Status   05/11/2021 6.0 (H) 0 - 5.6 % Final     Comment:     Normal <5.7% Prediabetes 5.7-6.4%  Diabetes 6.5% or higher -  adopted from ADA   consensus guidelines.         Continue dietary control     History of stroke in 2006 and 2020    Continue aspirin and statin     Consider anticoagulation for atrial fibrillation     Acute on chronic anemia   Recent low folate level   Hemoglobin   Date Value Ref Range Status   05/15/2021 9.4 (L) 13.3 - 17.7 g/dL Final   05/12/2021 10.0 (L) 13.3 - 17.7 g/dL Final   Folate ok but B12 borderline low and iron studies consistent with deficiency.    Give intravenous iron and IM B12    Fecal occult blood not done     Non severe malnutrition   % Weight Loss:  Weight loss does not meet criteria for malnutrition   % Intake:  </= 50% for >/= 1 month (severe malnutrition)  Subcutaneous Fat Loss:  Upper arm region mild depletion  Muscle Loss:  Clavicle bone region mild depletion, Acromion bone region mild depletion and Dorsal hand region mild depletion  Fluid Retention:  None noted     Diet: Combination Diet Dysphagia Diet Level 2: Mechan Altered; Nectar Thickened Liquids (pre-thickened or use instant food thickener) (by spoon)  Snacks/Supplements Adult: Other; 10 am Vital 500; 2 pm Ensure (nectar thick); Between Meals  Snacks/Supplements Adult: Magic Cup; With Meals    DVT Prophylaxis: Enoxaparin (Lovenox) SQ  Cardona Catheter: in place, indication: Neurogenic Bladder  Code Status: Full Code         Disposition Plan   Expected discharge: Tomorrow, recommended to transitional care unit once resp status is stable .  Entered: Barron Duque MD 05/16/2021, 1:24 PM       The patient's care was discussed with the Bedside Nurse and Patient.    Barron Duque MD  Hospitalist Service  [facility]  Contact information available via [facility] Paging/Directory    ______________________________________________________________________    Interval History   No new complaints- still on 3L nasal canula with oxygen saturation  90-92% at rest.  He was on supplemental oxygen after his stroke but  has not been on any recently.    Data reviewed today: I reviewed all medications, new labs and imaging results over the last 24 hours. I personally reviewed no images or EKG's today.    Physical Exam   Vital Signs: Temp: 98.5  F (36.9  C) Temp src: Oral BP: (!) 151/88 Pulse: 83   Resp: 16 SpO2: 94 % O2 Device: Nasal cannula Oxygen Delivery: 3 LPM  Weight: 253 lbs 1.6 oz  Constitutional: awake, alert, cooperative, no apparent distress  Respiratory: No increased work of breathing, no wheezing, rales or rhonchi heard  Cardiovascular: regular rate and rhythm, normal S1 and S2, no S3 or S4, and no murmur noted  GI:normal bowel sounds, soft, non-distended, non-tender  Skin: no rashes  Neurologic: Awake, alert, oriented to name, place and time.  Cranial nerves II-XII are grossly intact.  Motor is 5 out of 5 bilaterally.  Neuropsychiatric: General: normal, calm and normal eye contact    Data   Recent Labs   Lab 05/15/21  1155 05/15/21  0703 05/14/21  0710 05/13/21  0814 05/12/21  0740 05/10/21  2110 05/10/21  2110   WBC 7.2  --   --   --  6.5  --  8.3   HGB 9.4*  --   --   --  10.0*  --  11.3*   MCV 89  --   --   --  89  --  87     --  176  --  161  --  173   NA  --  145* 144 143 141  --  136   POTASSIUM  --  4.0 3.3* 3.4 3.8   < > 4.1   CHLORIDE  --  113* 113* 111* 108  --  101   CO2  --  28 27 27 30  --  32   BUN  --  19 23 26 26  --  27   CR  --  1.05 1.26* 1.32* 0.78   < > 0.70   ANIONGAP  --  4 4 5 3  --  3   RAJ  --  8.1* 8.1* 8.1* 8.2*  --  8.3*   GLC  --  88 98 87 99  --  135*   ALBUMIN  --   --   --   --   --   --  2.0*   PROTTOTAL  --   --   --   --   --   --  7.2   BILITOTAL  --   --   --   --   --   --  0.5   ALKPHOS  --   --   --   --   --   --  88   ALT  --   --   --   --   --   --  37   AST  --   --   --   --   --   --  35   TROPI  --   --   --   --   --   --  <0.015    < > = values in this interval not displayed.     No results found for this or any previous visit (from the past 24  hour(s)).  Medications       allopurinol  300 mg Oral Daily     aspirin  325 mg Oral Daily     atorvastatin  10 mg Oral Daily     enoxaparin ANTICOAGULANT  40 mg Subcutaneous Q24H     insulin aspart  1-7 Units Subcutaneous TID AC     ipratropium-albuterol  1 puff Inhalation 4x Daily     metoprolol tartrate  12.5 mg Oral BID     PARoxetine  20 mg Oral Daily     polyethylene glycol  17 g Oral Daily     sodium chloride (PF)  3 mL Intracatheter Q8H

## 2021-05-16 NOTE — PLAN OF CARE
Sleeps between cares. Bm x1. Declined dinner but requests apple juice frequently. Glucose checks do not require insulin coverage. O2 3l. Anticipating discharge tomorrow.

## 2021-05-16 NOTE — PROGRESS NOTES
Pt is A/O. VSS on 3L O2 NC. Repo q2hrs. L-sided weakness. Brendan patent. Denies pain. Ate moderately today. Stool sample collection pending. Will continue to monitor.

## 2021-05-17 VITALS
RESPIRATION RATE: 18 BRPM | DIASTOLIC BLOOD PRESSURE: 70 MMHG | HEART RATE: 89 BPM | BODY MASS INDEX: 33.49 KG/M2 | TEMPERATURE: 98 F | OXYGEN SATURATION: 92 % | WEIGHT: 246.9 LBS | SYSTOLIC BLOOD PRESSURE: 124 MMHG

## 2021-05-17 LAB
BACTERIA SPEC CULT: NO GROWTH
CREAT SERPL-MCNC: 1.07 MG/DL (ref 0.66–1.25)
GFR SERPL CREATININE-BSD FRML MDRD: 66 ML/MIN/{1.73_M2}
GLUCOSE BLDC GLUCOMTR-MCNC: 105 MG/DL (ref 70–99)
GLUCOSE BLDC GLUCOMTR-MCNC: 93 MG/DL (ref 70–99)
GLUCOSE BLDC GLUCOMTR-MCNC: 95 MG/DL (ref 70–99)
HGB BLD-MCNC: 9.6 G/DL (ref 13.3–17.7)
PLATELET # BLD AUTO: 251 10E9/L (ref 150–450)
SPECIMEN SOURCE: NORMAL

## 2021-05-17 PROCEDURE — 82565 ASSAY OF CREATININE: CPT | Performed by: INTERNAL MEDICINE

## 2021-05-17 PROCEDURE — 99239 HOSP IP/OBS DSCHRG MGMT >30: CPT | Performed by: HOSPITALIST

## 2021-05-17 PROCEDURE — 36415 COLL VENOUS BLD VENIPUNCTURE: CPT | Performed by: INTERNAL MEDICINE

## 2021-05-17 PROCEDURE — 85049 AUTOMATED PLATELET COUNT: CPT | Performed by: INTERNAL MEDICINE

## 2021-05-17 PROCEDURE — 250N000011 HC RX IP 250 OP 636: Performed by: INTERNAL MEDICINE

## 2021-05-17 PROCEDURE — 999N001017 HC STATISTIC GLUCOSE BY METER IP

## 2021-05-17 PROCEDURE — 250N000011 HC RX IP 250 OP 636: Performed by: HOSPITALIST

## 2021-05-17 PROCEDURE — 250N000013 HC RX MED GY IP 250 OP 250 PS 637: Performed by: HOSPITALIST

## 2021-05-17 PROCEDURE — 250N000013 HC RX MED GY IP 250 OP 250 PS 637: Performed by: INTERNAL MEDICINE

## 2021-05-17 PROCEDURE — 85018 HEMOGLOBIN: CPT | Performed by: INTERNAL MEDICINE

## 2021-05-17 RX ORDER — FUROSEMIDE 40 MG
40 TABLET ORAL DAILY
Status: ON HOLD | DISCHARGE
Start: 2021-05-17 | End: 2023-04-09

## 2021-05-17 RX ADMIN — IPRATROPIUM BROMIDE AND ALBUTEROL 1 PUFF: 20; 100 SPRAY, METERED RESPIRATORY (INHALATION) at 08:31

## 2021-05-17 RX ADMIN — Medication 500 MCG: at 08:30

## 2021-05-17 RX ADMIN — ENOXAPARIN SODIUM 40 MG: 40 INJECTION SUBCUTANEOUS at 08:29

## 2021-05-17 RX ADMIN — ATORVASTATIN CALCIUM 10 MG: 10 TABLET, FILM COATED ORAL at 08:30

## 2021-05-17 RX ADMIN — FUROSEMIDE 20 MG: 10 INJECTION, SOLUTION INTRAVENOUS at 03:00

## 2021-05-17 RX ADMIN — METOPROLOL TARTRATE 12.5 MG: 25 TABLET, FILM COATED ORAL at 08:30

## 2021-05-17 RX ADMIN — ASPIRIN 325 MG: 325 TABLET, COATED ORAL at 08:30

## 2021-05-17 RX ADMIN — PAROXETINE HYDROCHLORIDE 20 MG: 20 TABLET, FILM COATED ORAL at 08:30

## 2021-05-17 RX ADMIN — ALLOPURINOL 300 MG: 300 TABLET ORAL at 08:30

## 2021-05-17 ASSESSMENT — ACTIVITIES OF DAILY LIVING (ADL)
ADLS_ACUITY_SCORE: 26
ADLS_ACUITY_SCORE: 26
ADLS_ACUITY_SCORE: 24
ADLS_ACUITY_SCORE: 26

## 2021-05-17 NOTE — PLAN OF CARE
Pt A&O. VSS using 3.5 L O2. Cardona in place, Tele SR BBB. Turn and repo, denied pain. Continue to monitor.

## 2021-05-17 NOTE — PLAN OF CARE
AVS given to pt.  Pt to discharge to [facility] via stretcher, 3 L NC. All belongings sent with pt.

## 2021-05-17 NOTE — DISCHARGE SUMMARY
"[facility]  Hospitalist Discharge Summary      Date of Admission:  5/10/2021  Date of Discharge:  5/17/2021  Discharging Provider: Barron Duque MD  Discharge Diagnoses    1. Acute diastolic congestive heart failure   2. Hypoxic acute respiratory failure  3. Possible aspiration pneumonia  4. Acute on chronic anemia   5. Vitamin B12 deficiency   6. Non severe malnutrition     History of    Chronic obstructive lung disease     Sleep disordered breathing, non compliant with continuous positive airway pressure     Recent COVID-19 infection 5/1/2021    Chronic urinary catheter     History of kidney stones     Enterococcus catheter related urinary tract infection present on admission    Hypertension     Intermittent atrial fibrillation - not on anticoagulation     Hyperlipidemia    Dysphagia, chronic     Depression     Type 2 diabetes mellitus     Stroke in 2006 and 2020    Anemia     Follow-ups Needed After Discharge   Follow-up Appointments     Follow Up and recommended labs and tests      Follow up with long-term physician.  The following labs/tests are   recommended: CMP and BMP in 5 days.  Chest X-ray in 2 weeks.           Unresulted Labs Ordered in the Past 30 Days of this Admission     No orders found from 4/10/2021 to 5/11/2021.        Discharge Disposition   Discharged to short-term care facility  Condition at discharge: Stable    Hospital Course    HPI:  \"Has been obtained partially from the patient.  He seemed to be a poor historian.  I do know him from his most recent hospitalization at [facility]. I did discuss with Marine Morrell PA-C in the ER and I also reviewed his chart extensively.  Mr. Ron Gilmore is a 80-year-old gentleman with complex past medical history of CVA with residual left-sided weakness, COPD, chronic urinary retention with chronic indwelling Cardona catheter, history of septic shock secondary to E. coli bacteremia due to an obstructive " lumbosacral UV junction stone with hydronephrosis status post right-sided percutaneous tube placement and removal and right-sided stent placement.  Also history of dyslipidemia, depression, diabetes mellitus type 2 diet-controlled and dysphagia and recent COVID-19 infection who was sent from TCU for evaluation of shortness of breath and hypoxia.   The patient had a few recent hospitalizations at [facility].  One hospitalization from 03/09-03/22/2021 for septic shock secondary to E. coli bacteremia due to an obstructive right-sided UV junction stone with hydronephrosis for which he underwent right-sided percutaneous nephrostomy tube placement followed by removal and right ureteral stent placement.   He did followup with Urology on 04/15 and had the stent removed.  He has chronic indwelling Cardona catheter.  He had another admission from 05/01-05/04/2021 when he presented acute hypoxic respiratory failure.  He was found to have COVID-19 infection.  It seems that he did receive his first immunization shot for COVID-19 a while ago, but unfortunately he missed his second dose.  While in the hospital, he was started on remdesivir and dexamethasone as per protocol.  He was also found to have abnormal UA.  He was on IV ceftriaxone in the hospital.  His urine culture was pending at the time of discharge.  He was discharged on p.o. ciprofloxacin.  His urine culture came back positive for enterococcus faecalis, pansensitive.  He was discharged to transitional care unit.  Based on his urine culture, ciprofloxacin was discontinued and he was started on Macrobid.  It seems that while in TCU his dysphagia diet was initially upgraded from DD1 to DD3 with nectar-thickened liquids.  Apparently, as per the notes, he started having some shortness of breath and cough, was noted to have increased O2 needs.  Apparently a chest x-ray done in TCU showed new patchy basilar and right upper lobe pulmonary opacities suspicious  for pneumonic infiltrates.  There is suspected mild pulmonary edema as well.  He was started on Levaquin in the TCU and because of concern for further deterioration he was sent to the ER for further evaluation.  I saw the patient in the ER.  His voice was very hoarse.  This is different from last hospitalization when I admitted him as well.  He reported that his breathing status was close to baseline.  He denies any chest pain.  He denies having fevers, but did report feeling cold.  He denies any headache, no nausea, no vomiting.  He denies abdominal pain, no diarrhea.  He does report feeling mildly constipated.   In the ER, he was seen by Marine Morrell PA-C.  His vitals in the ER showed blood pressure 104/74, his O2 saturation dropped to 89% on room air, it did improve to 97% on 4 L nasal cannula.  His heart rate was 90.  T-max in ER 99.4.  He did have VBG done in the ER, which showed pH 7.36, pCO2 of 6, pO2 of 14.  His BMP showed sodium 136, potassium 4.1, chloride 101, bicarb 32, BUN 27.  His calcium was 8.3, anion gap 3, albumin 2, total protein 7.2, total bilirubin 0.5, alkaline phosphatase 88, ALT 37, AST 35, lactic acid 1.7. Troponin less than 0.015.  CBC shows white blood cells 8.3, hemoglobin 11.3, hematocrit 37.5 and platelet count 273.  D-dimer was 1.4.  He had CT of the chest with IV contrast, which did not show any evidence of pulmonary embolism.  It did show some hazy, ill-defined nodular granular infiltrates with interstitial infiltrates in both lungs, greater in the lower lobe, most compatible with pneumonia, no pleural effusion.  There is moderate to marked centric lobular emphysema.  There is no significant urinary collecting system dilation or calculi, but there is minimal periurethral edema on the right extending towards the right hilum which can be seen with infectious or inflammatory etiologies associated with urinary collecting system.  In the ER, he was given 1 dose of IV Zosyn and  "hospitalist service was called regarding admission.\"       Hypoxic acute respiratory failure +/- chronic respiratory failure   Bilateral interstitial infiltrates on chest X-ray, pneumonia(?aspiration) +/- diastolic congestive heart failure   History of chronic obstructive lung disease   Sleep disordered breathing, non compliant with continuous positive airway pressure   Recent COVID-19 infection 5/1/2021  Chest X-ray is abnormal but he only had a couple of temperatures around 100 on 5/11.  Normal WBC.  He has received 5 days of Zosyn and also 1 day of nitrofurantoin for presumptive pneumonia and a urinary tract infection.  He is afebrile and on 3L nasal canula.  Apparently he had been on home oxygen in the past but not clear what he was on when he was admitted this time.  Chest X-ray is still abnormal.  C-reactive protein 80, pro-calcitonin 0.05 and BNP 4500.  Transthoracic echocardiogram showed LVH and diastolic dysfunction   He has been started on furosemide.    Continue inhaled bronchodilators     Continue supplemental oxygen     Discharge on oral furosemide     Chronic urinary catheter   History of kidney stones   Enterococcus catheter related urinary tract infection present on admission    Completed Zosyn    Catheter changed     Stopped nitrofurantoin     Elevated BNP- probable diastolic congestive heart failure   Hypertension   Intermittent atrial fibrillation - not on anticoagulation   Transthoracic echocardiogram showed LVH and diastolic dysfunction.    Continue metoprolol     Discharge on furosemide    Unless contraindicated he should be on anticoagulation - can discussed with primary care provider     Hyperlipidemia     Continue statin     Dysphagia, ?chronic     Continue modified diet     Depression     Continue paroxetine    Type 2 diabetes mellitus   Hemoglobin A1C   Date Value Ref Range Status   05/11/2021 6.0 (H) 0 - 5.6 % Final     Comment:     Normal <5.7% Prediabetes 5.7-6.4%  Diabetes 6.5% or " higher - adopted from ADA   consensus guidelines.         Continue dietary control     History of stroke in 2006 and 2020    Continue aspirin and statin     Consider anticoagulation for atrial fibrillation     Acute on chronic anemia   Low B12 level  Hemoglobin   Date Value Ref Range Status   05/15/2021 9.4 (L) 13.3 - 17.7 g/dL Final   05/12/2021 10.0 (L) 13.3 - 17.7 g/dL Final   Folate ok but B12 borderline low and iron studies consistent with deficiency. Received parenteral B12    Continue B12    Fecal occult blood not done     Follow up with primary care provider     Non severe malnutrition   % Weight Loss:  Weight loss does not meet criteria for malnutrition   % Intake:  </= 50% for >/= 1 month (severe malnutrition)  Subcutaneous Fat Loss:  Upper arm region mild depletion  Muscle Loss:  Clavicle bone region mild depletion, Acromion bone region mild depletion and Dorsal hand region mild depletion  Fluid Retention:  None noted    Consultations This Hospital Stay   SWALLOW EVAL SPEECH PATH AT BEDSIDE IP CONSULT  PHARMACY TO DOSE VANCO  CARE MANAGEMENT / SOCIAL WORK IP CONSULT  PHYSICAL THERAPY ADULT IP CONSULT  NUTRITION SERVICES ADULT IP CONSULT  OCCUPATIONAL THERAPY ADULT IP CONSULT  PHYSICAL THERAPY ADULT IP CONSULT  SPEECH LANGUAGE PATH ADULT IP CONSULT    Code Status   Full Code    Time Spent on this Encounter   I, Barron Duque MD, personally saw the patient today and spent greater than 30 minutes discharging this patient.       Barron Duque MD  [place] [address] [facility]  [facility] [place] [address]  Phone: [phone]  ______________________________________________________________________    Physical Exam   Vital Signs: Temp: 98  F (36.7  C) Temp src: Oral BP: 124/70 Pulse: 89   Resp: 18 SpO2: 92 % O2 Device: Nasal cannula Oxygen Delivery: Others (comment)(3.5)  Weight: 246 lbs 14.4 oz  Constitutional: awake, alert, cooperative, no apparent  distress  Respiratory: No increased work of breathing, good air exchange, clear to auscultation bilaterally, no crackles or wheezing  Cardiovascular: Normal apical impulse, regular rate and rhythm, normal S1 and S2, no S3 or S4, and no murmur noted  Neuropsychiatric: General: normal, calm and normal eye contact       Primary Care Physician   Augustin Thomas    Discharge Orders      Medication Therapy Management Referral      General info for SNF    Length of Stay Estimate: Short Term Care: Estimated # of Days <30  Condition at Discharge: Improving  Level of care:skilled   Rehabilitation Potential: Good  Admission H&P remains valid and up-to-date: Yes  Recent Chemotherapy: N/A  Use Nursing Home Standing Orders: Yes     Mantoux instructions    Give two-step Mantoux (PPD) Per Facility Policy Yes     Reason for your hospital stay    Respiratory failure     Follow Up and recommended labs and tests    Follow up with halfway physician.  The following labs/tests are recommended: CMP and BMP in 5 days.  Chest X-ray in 2 weeks.     Activity - Up with nursing assistance     Additional Discharge Instructions    Oxygen by nasal canula 3-4 L/min     Full Code     Occupational Therapy Adult Consult    Evaluate and treat as clinically indicated.    Reason:  deconditioning     Physical Therapy Adult Consult    Evaluate and treat as clinically indicated.    Reason:  deconditioning     Speech Language Path Adult Consult    Evaluate and treat as clinically indicated.    Reason:  Dysphagia     Fall precautions     Advance Diet as Tolerated    Follow this diet upon discharge: Orders Placed This Encounter      Snacks/Supplements Adult: Other; 10 am Vital 500; 2 pm Ensure (nectar thick); Between Meals      Snacks/Supplements Adult: Magic Cup; With Meals      Combination Diet Dysphagia Diet Level 2: Mechan Altered; Nectar Thickened Liquids (pre-thickened or use instant food thickener) (by spoon)       Significant Results and  Procedures   Most Recent 3 CBC's:  Recent Labs   Lab Test 05/17/21  0657 05/15/21  1155 05/14/21  0710 05/12/21  0740 05/10/21  2110   WBC  --  7.2  --  6.5 8.3   HGB 9.6* 9.4*  --  10.0* 11.3*   MCV  --  89  --  89 87    198 176 161 173     Most Recent 3 BMP's:  Recent Labs   Lab Test 05/17/21  0657 05/15/21  0703 05/14/21  0710 05/13/21  0814   NA  --  145* 144 143   POTASSIUM  --  4.0 3.3* 3.4   CHLORIDE  --  113* 113* 111*   CO2  --  28 27 27   BUN  --  19 23 26   CR 1.07 1.05 1.26* 1.32*   ANIONGAP  --  4 4 5   RAJ  --  8.1* 8.1* 8.1*   GLC  --  88 98 87     Most Recent 3 BNP's:  Recent Labs   Lab Test 05/15/21  1155 05/01/21  1558 10/12/18  2032   NTBNPI 4,482* 664 601   ,   Results for orders placed or performed during the hospital encounter of 05/10/21   XR Chest Port 1 View    Narrative    XR CHEST PORT 1 VIEW 5/10/2021 9:36 PM    HISTORY: Cough    COMPARISON: Chest x-ray 5/1/2021      Impression    IMPRESSION: Stable left-sided implanted device. Low lung volumes. New  patchy left basilar and right upper lobe pulmonary opacities  suspicious for pneumonic infiltrates. No definite pleural effusion.  Stable heart size and central vascular prominence. Suspected mild  pulmonary edema. Stable upper mediastinal soft tissue prominence  likely related to prominent upper mediastinal fat as seen on  comparison CT from 6/13/2017.    RUDY GALEANA MD   CT Chest (PE) Abdomen Pelvis w Contrast    Narrative    EXAM: CT CHEST PE ABDOMEN PELVIS W CONTRAST  LOCATION: [facility]  DATE/TIME: 5/11/2021 12:44 AM    INDICATION: New fevers and hypoxia. Prior history of renal stone with urinary stent. Assess for obstruction and pneumonia versus pulmonary embolism.  COMPARISON: CT 03/09/2021, 06/13/2017  TECHNIQUE: CT chest pulmonary angiogram and routine CT abdomen pelvis with IV contrast. Arterial phase through the chest and venous phase through the abdomen and pelvis. Multiplanar reformats and MIP  reconstructions were performed. Dose reduction   techniques were used.   CONTRAST: 117mL [id number]-370    FINDINGS:  ANGIOGRAM CHEST: Respiratory motion artifact limits evaluation of the peripheral pulmonary arteries. Allowing for this, there is no evidence for pulmonary embolism. Normal caliber thoracic aorta. Phase of contrast opacification does not allow for   evaluation of dissection. Diffuse moderate aortic calcification.    LUNGS AND PLEURA: Respiratory motion artifact limits evaluation for subtle findings. Allowing for the respiratory motion artifact, there are hazy interstitial and alveolar infiltrates involving both lower lobes in the posterior aspect of the left upper   lobe and right upper lobe most compatible with pneumonia. Moderate to marked centrilobular emphysema. No pleural fluid.    MEDIASTINUM/AXILLAE: Normal heart size. No pericardial fluid. No lymphadenopathy.    CORONARY ARTERY CALCIFICATION: Severe.    HEPATOBILIARY: Cholecystectomy. No significant biliary dilatation. Liver unremarkable.    PANCREAS: Normal.    SPLEEN: Normal.    ADRENAL GLANDS: Normal.    KIDNEYS/BLADDER: No urinary collecting system dilatation or calculi. Mild periureteral and perinephric edema surrounds right renal pelvis which can be seen with infectious or inflammatory etiologies. Symmetric contrast enhancement to both kidneys. Cardona   catheter decompressing the bladder.     BOWEL: No bowel dilatation or inflammatory change. Large amount of stool in the colon and rectal vault. Appendix unremarkable.    LYMPH NODES: No lymphadenopathy.    VASCULATURE: Normal caliber aorta. Marked atherosclerotic vascular calcification.    PELVIC ORGANS: Unremarkable. No free fluid.    MUSCULOSKELETAL: Right BETSEY. Marked degenerative changes left hip. Moderate degenerative disc changes thoracic and lumbar spine.      Impression    IMPRESSION:  1.  Allowing for respiratory motion artifact, there is no definitive evidence for pulmonary  embolism.    2.  Hazy ill-defined nodular alveolar infiltrates with interstitial infiltrates in both lungs greater in the lower lobe most compatible with pneumonia. No pleural fluid.    3.  Moderate to marked centrilobular emphysema.    4.  No significant urinary collecting system dilatation or calculi. Minimal periureteral edema on the right extending toward the right renal hilum which can be seen with infectious or inflammatory etiologies associated with the urinary collecting system.   Symmetric contrast enhancement to both kidneys without evidence for renal abscess.    5.  Remainder of findings as detailed above.   XR Chest Port 1 View    Narrative    CHEST ONE VIEW PORTABLE May 15, 2021 11:47 AM     HISTORY: Respiratory failure.    COMPARISON: 5/10/2021.      Impression    IMPRESSION: Shallow inspiration accentuates heart size and pulmonary  vascularity. Interstitial and airspace infiltrates bilaterally have  overall increased slightly since the previous exam. No pneumothorax.  Aortic calcification. Implantable device on the left is unchanged.    AUGUSTIN PAYAN MD   Echocardiogram Complete    Narrative    [id number]  [id number]  [id number]  [id number]^JAGDEEP^DANII^HUGO     [facility]  Echocardiography Laboratory  [address] [place] [address]     Name: SHERI THORPE  MRN: [id number]  : 1940  Study Date: 2021 01:07 PM  Age: 80 yrs  Gender: Male  Patient Location: [facility]  Reason For Study: CHF  Ordering Physician: DANII GANNON  Referring Physician: Augustin Thomas  Performed By: Joseph Dergoot     BSA: 2.4 m2  Height: 72 in  Weight: 252 lb  HR: 86  BP: 151/89 mmHg  ______________________________________________________________________________  Procedure  Complete Portable Echo Adult. Optison (NDC #0862-1113) given intravenously.  ______________________________________________________________________________  Interpretation Summary     The left ventricle is normal in  size.  There is moderate to severe concentric left ventricular hypertrophy.  The visual ejection fraction is estimated at 60-65%.  Grade I or early diastolic dysfunction.  No regional wall motion abnormalities noted.  Mildly decreased right ventricular systolic function  The right ventricle is moderately dilated.  Mild (35-45mmHg) pulmonary hypertension is present.  No significant valvular heart disease.  ______________________________________________________________________________  Left Ventricle  The left ventricle is normal in size. There is moderate to severe concentric  left ventricular hypertrophy. The visual ejection fraction is estimated at 60-  65%. Grade I or early diastolic dysfunction. No regional wall motion  abnormalities noted.     Right Ventricle  The right ventricle is moderately dilated. Mildly decreased right ventricular  systolic function.     Atria  Normal left atrial size. Right atrial size is normal. There is no color  Doppler evidence of an atrial shunt.     Mitral Valve  The mitral valve leaflets are mildly thickened. There is mild mitral annular  calcification. There is trace mitral regurgitation.     Tricuspid Valve  There is trace tricuspid regurgitation. The right ventricular systolic  pressure is approximated at 37.3 mmHg plus the right atrial pressure. Mild  (35-45mmHg) pulmonary hypertension is present. IVC diameter and respiratory  changes fall into an intermediate range suggesting an RA pressure of 8 mmHg.     Aortic Valve  There is trivial trileaflet aortic sclerosis. No aortic regurgitation is  present.     Pulmonic Valve  There is trace pulmonic valvular regurgitation.     Vessels  Normal size aorta. Mild aortic root dilatation.     Pericardium  There is no pericardial effusion.     Rhythm  Sinus rhythm was noted.  ______________________________________________________________________________  MMode/2D Measurements & Calculations  IVSd: 1.5 cm     LVIDd: 3.9 cm  LVIDs: 3.1  cm  LVPWd: 1.7 cm  FS: 19.6 %  LV mass(C)d: 259.2 grams  LV mass(C)dI: 110.3 grams/m2  Ao root diam: 3.9 cm  LA dimension: 3.3 cm  asc Aorta Diam: 3.5 cm  LA/Ao: 0.82  LVOT diam: 2.5 cm  LVOT area: 4.7 cm2  RWT: 0.89     Doppler Measurements & Calculations  MV E max kaushik: 57.7 cm/sec  MV A max kaushik: 91.7 cm/sec  MV E/A: 0.63  MV dec slope: 199.9 cm/sec2  MV dec time: 0.29 sec  PA acc time: 0.14 sec  TR max kaushik: 305.3 cm/sec  TR max P.3 mmHg  Pulm Sys Kaushik: 46.4 cm/sec  Pulm Andrews Kaushik: 31.7 cm/sec  Pulm S/D: 1.5  E/E' av.3  Lateral E/e': 7.1     Medial E/e': 9.5     ______________________________________________________________________________  Report approved by: Maynor Gardiner 2021 02:44 PM               Discharge Medications   Current Discharge Medication List      START taking these medications    Details   cyanocobalamin (VITAMIN B-12) 500 MCG SUBL sublingual tablet Place 1 tablet (500 mcg) under the tongue daily  Qty:      Associated Diagnoses: Vitamin B12 deficiency (non anemic)      furosemide (LASIX) 40 MG tablet Take 1 tablet (40 mg) by mouth daily    Associated Diagnoses: Acute diastolic congestive heart failure (H)         CONTINUE these medications which have NOT CHANGED    Details   acetaminophen (TYLENOL) 325 MG tablet Take 650 mg by mouth every 4 hours as needed for mild pain as needed for pain/fever Max dose 3000mg/24hr      allopurinol (ZYLOPRIM) 300 MG tablet Take 300 mg by mouth daily      aspirin (ASA) 325 MG EC tablet Take 1 tablet (325 mg) by mouth daily    Associated Diagnoses: Left arm weakness; Cerebrovascular accident (CVA), unspecified mechanism (H)      atorvastatin (LIPITOR) 10 MG tablet Take 10 mg by mouth daily      Emollient (AMLACTIN ULTRA EX) Apply topically daily as needed TO FEET      ipratropium-albuterol (COMBIVENT RESPIMAT)  MCG/ACT inhaler Inhale 1 puff into the lungs 4 times daily      metoprolol tartrate (LOPRESSOR) 25 MG tablet Take 0.5 tablets (12.5 mg)  by mouth 2 times daily    Associated Diagnoses: Cerebrovascular accident (CVA), unspecified mechanism (H)      PARoxetine (PAXIL) 20 MG tablet Take 20 mg by mouth daily      polyethylene glycol (MIRALAX) 17 GM/Dose powder Take 17 g by mouth daily  Qty: 510 g    Associated Diagnoses: Constipation, unspecified constipation type         STOP taking these medications       levofloxacin (LEVAQUIN) 750 MG tablet Comments:   Reason for Stopping:         nitroFURantoin macrocrystal-monohydrate (MACROBID) 100 MG capsule Comments:   Reason for Stopping:             Allergies   No Known Allergies

## 2021-05-17 NOTE — PROGRESS NOTES
Care Management Discharge Note    Discharge Date: 05/17/21       Discharge Disposition: Transitional Care    Discharge Services:      Discharge DME:      Discharge Transportation: health plan transportation    Private pay costs discussed: transportation costs    PAS Confirmation Code:    Patient/family educated on Medicare website which has current facility and service quality ratings: yes    Education Provided on the Discharge Plan:    Persons Notified of Discharge Plans: yes  Patient/Family in Agreement with the Plan:      Handoff Referral Completed: No    Additional Information:  Call to [facility] to confirm that patient has a ride today at 1200. Page to Dr. Duque to update discharge plan and request orders. Orders received and faxed to facility. Call to [facility] to confirm ride time and they are in agreement. No PAS needed.        TIFFANY Bartholomew

## 2021-05-18 ENCOUNTER — APPOINTMENT (OUTPATIENT)
Dept: CT IMAGING | Facility: CLINIC | Age: 81
DRG: 871 | End: 2021-05-18
Attending: EMERGENCY MEDICINE
Payer: COMMERCIAL

## 2021-05-18 ENCOUNTER — PATIENT OUTREACH (OUTPATIENT)
Dept: CARE COORDINATION | Facility: CLINIC | Age: 81
End: 2021-05-18

## 2021-05-18 ENCOUNTER — HOSPITAL ENCOUNTER (INPATIENT)
Facility: CLINIC | Age: 81
LOS: 16 days | Discharge: NURSING, REHAB OR LONG-TERM CARE | DRG: 871 | End: 2021-06-03
Attending: EMERGENCY MEDICINE | Admitting: INTERNAL MEDICINE
Payer: COMMERCIAL

## 2021-05-18 ENCOUNTER — NURSING HOME VISIT (OUTPATIENT)
Dept: GERIATRICS | Facility: CLINIC | Age: 81
End: 2021-05-18
Payer: COMMERCIAL

## 2021-05-18 ENCOUNTER — APPOINTMENT (OUTPATIENT)
Dept: GENERAL RADIOLOGY | Facility: CLINIC | Age: 81
DRG: 871 | End: 2021-05-18
Attending: EMERGENCY MEDICINE
Payer: COMMERCIAL

## 2021-05-18 ENCOUNTER — DOCUMENTATION ONLY (OUTPATIENT)
Dept: OTHER | Facility: CLINIC | Age: 81
End: 2021-05-18

## 2021-05-18 VITALS
SYSTOLIC BLOOD PRESSURE: 99 MMHG | WEIGHT: 233.7 LBS | HEART RATE: 78 BPM | HEIGHT: 72 IN | BODY MASS INDEX: 31.65 KG/M2 | TEMPERATURE: 98.2 F | RESPIRATION RATE: 16 BRPM | DIASTOLIC BLOOD PRESSURE: 72 MMHG | OXYGEN SATURATION: 96 %

## 2021-05-18 DIAGNOSIS — E66.01 MORBID OBESITY, UNSPECIFIED OBESITY TYPE (H): ICD-10-CM

## 2021-05-18 DIAGNOSIS — R39.14 BENIGN PROSTATIC HYPERPLASIA WITH INCOMPLETE BLADDER EMPTYING: ICD-10-CM

## 2021-05-18 DIAGNOSIS — I48.0 PAROXYSMAL ATRIAL FIBRILLATION (H): ICD-10-CM

## 2021-05-18 DIAGNOSIS — J44.9 CHRONIC OBSTRUCTIVE PULMONARY DISEASE, UNSPECIFIED COPD TYPE (H): ICD-10-CM

## 2021-05-18 DIAGNOSIS — N40.1 BENIGN PROSTATIC HYPERPLASIA WITH INCOMPLETE BLADDER EMPTYING: ICD-10-CM

## 2021-05-18 DIAGNOSIS — J96.21 ACUTE AND CHRONIC RESPIRATORY FAILURE WITH HYPOXIA (H): ICD-10-CM

## 2021-05-18 DIAGNOSIS — I69.354 HEMIPARESIS AFFECTING LEFT SIDE AS LATE EFFECT OF CEREBROVASCULAR ACCIDENT (CVA) (H): ICD-10-CM

## 2021-05-18 DIAGNOSIS — J96.21 ACUTE ON CHRONIC RESPIRATORY FAILURE WITH HYPOXIA (H): Primary | ICD-10-CM

## 2021-05-18 DIAGNOSIS — R53.81 PHYSICAL DECONDITIONING: ICD-10-CM

## 2021-05-18 DIAGNOSIS — I69.391 DYSPHAGIA DUE TO RECENT CEREBROVASCULAR ACCIDENT (CVA): ICD-10-CM

## 2021-05-18 LAB
ALBUMIN UR-MCNC: 10 MG/DL
ANION GAP SERPL CALCULATED.3IONS-SCNC: 4 MMOL/L (ref 3–14)
APPEARANCE UR: ABNORMAL
BASOPHILS # BLD AUTO: 0 10E9/L (ref 0–0.2)
BASOPHILS NFR BLD AUTO: 0.2 %
BILIRUB UR QL STRIP: NEGATIVE
BUN SERPL-MCNC: 24 MG/DL (ref 7–30)
CALCIUM SERPL-MCNC: 8.8 MG/DL (ref 8.5–10.1)
CHLORIDE SERPL-SCNC: 105 MMOL/L (ref 94–109)
CO2 BLDCOV-SCNC: 35 MMOL/L (ref 21–28)
CO2 SERPL-SCNC: 34 MMOL/L (ref 20–32)
COLOR UR AUTO: ABNORMAL
CREAT SERPL-MCNC: 1.17 MG/DL (ref 0.66–1.25)
DIFFERENTIAL METHOD BLD: ABNORMAL
EOSINOPHIL # BLD AUTO: 0.2 10E9/L (ref 0–0.7)
EOSINOPHIL NFR BLD AUTO: 1.9 %
ERYTHROCYTE [DISTWIDTH] IN BLOOD BY AUTOMATED COUNT: 16.9 % (ref 10–15)
GFR SERPL CREATININE-BSD FRML MDRD: 59 ML/MIN/{1.73_M2}
GLUCOSE SERPL-MCNC: 147 MG/DL (ref 70–99)
GLUCOSE UR STRIP-MCNC: NEGATIVE MG/DL
HCT VFR BLD AUTO: 35.3 % (ref 40–53)
HGB BLD-MCNC: 10.5 G/DL (ref 13.3–17.7)
HGB UR QL STRIP: NEGATIVE
IMM GRANULOCYTES # BLD: 0.1 10E9/L (ref 0–0.4)
IMM GRANULOCYTES NFR BLD: 0.6 %
INTERPRETATION ECG - MUSE: NORMAL
KETONES UR STRIP-MCNC: NEGATIVE MG/DL
LABORATORY COMMENT REPORT: ABNORMAL
LACTATE BLD-SCNC: 1.7 MMOL/L (ref 0.7–2.1)
LEUKOCYTE ESTERASE UR QL STRIP: ABNORMAL
LYMPHOCYTES # BLD AUTO: 1.6 10E9/L (ref 0.8–5.3)
LYMPHOCYTES NFR BLD AUTO: 18.9 %
MCH RBC QN AUTO: 26.3 PG (ref 26.5–33)
MCHC RBC AUTO-ENTMCNC: 29.7 G/DL (ref 31.5–36.5)
MCV RBC AUTO: 88 FL (ref 78–100)
MONOCYTES # BLD AUTO: 0.5 10E9/L (ref 0–1.3)
MONOCYTES NFR BLD AUTO: 6 %
MUCOUS THREADS #/AREA URNS LPF: PRESENT /LPF
NEUTROPHILS # BLD AUTO: 6.1 10E9/L (ref 1.6–8.3)
NEUTROPHILS NFR BLD AUTO: 72.4 %
NITRATE UR QL: NEGATIVE
NRBC # BLD AUTO: 0 10*3/UL
NRBC BLD AUTO-RTO: 0 /100
NT-PROBNP SERPL-MCNC: 795 PG/ML (ref 0–1800)
PCO2 BLDV: 52 MM HG (ref 40–50)
PH BLDV: 7.44 PH (ref 7.32–7.43)
PH UR STRIP: 6.5 PH (ref 5–7)
PLATELET # BLD AUTO: 286 10E9/L (ref 150–450)
PO2 BLDV: 29 MM HG (ref 25–47)
POTASSIUM SERPL-SCNC: 3.4 MMOL/L (ref 3.4–5.3)
PROCALCITONIN SERPL-MCNC: <0.05 NG/ML
RBC # BLD AUTO: 4 10E12/L (ref 4.4–5.9)
RBC #/AREA URNS AUTO: 3 /HPF (ref 0–2)
SAO2 % BLDV FROM PO2: 56 %
SARS-COV-2 RNA RESP QL NAA+PROBE: POSITIVE
SODIUM SERPL-SCNC: 143 MMOL/L (ref 133–144)
SOURCE: ABNORMAL
SP GR UR STRIP: 1.01 (ref 1–1.03)
SPECIMEN SOURCE: ABNORMAL
SQUAMOUS #/AREA URNS AUTO: <1 /HPF (ref 0–1)
TROPONIN I SERPL-MCNC: <0.015 UG/L (ref 0–0.04)
UROBILINOGEN UR STRIP-MCNC: 0 MG/DL (ref 0–2)
WBC # BLD AUTO: 8.5 10E9/L (ref 4–11)
WBC #/AREA URNS AUTO: 78 /HPF (ref 0–5)
WBC CLUMPS #/AREA URNS HPF: PRESENT /HPF
YEAST #/AREA URNS HPF: ABNORMAL /HPF

## 2021-05-18 PROCEDURE — 87106 FUNGI IDENTIFICATION YEAST: CPT | Performed by: EMERGENCY MEDICINE

## 2021-05-18 PROCEDURE — 96361 HYDRATE IV INFUSION ADD-ON: CPT

## 2021-05-18 PROCEDURE — 36415 COLL VENOUS BLD VENIPUNCTURE: CPT

## 2021-05-18 PROCEDURE — 84145 PROCALCITONIN (PCT): CPT | Performed by: EMERGENCY MEDICINE

## 2021-05-18 PROCEDURE — 80048 BASIC METABOLIC PNL TOTAL CA: CPT | Performed by: EMERGENCY MEDICINE

## 2021-05-18 PROCEDURE — 82803 BLOOD GASES ANY COMBINATION: CPT

## 2021-05-18 PROCEDURE — 94660 CPAP INITIATION&MGMT: CPT

## 2021-05-18 PROCEDURE — 81001 URINALYSIS AUTO W/SCOPE: CPT | Performed by: EMERGENCY MEDICINE

## 2021-05-18 PROCEDURE — 87040 BLOOD CULTURE FOR BACTERIA: CPT | Performed by: EMERGENCY MEDICINE

## 2021-05-18 PROCEDURE — 120N000001 HC R&B MED SURG/OB

## 2021-05-18 PROCEDURE — 250N000009 HC RX 250: Performed by: EMERGENCY MEDICINE

## 2021-05-18 PROCEDURE — 71045 X-RAY EXAM CHEST 1 VIEW: CPT

## 2021-05-18 PROCEDURE — 84484 ASSAY OF TROPONIN QUANT: CPT | Performed by: EMERGENCY MEDICINE

## 2021-05-18 PROCEDURE — 5A09357 ASSISTANCE WITH RESPIRATORY VENTILATION, LESS THAN 24 CONSECUTIVE HOURS, CONTINUOUS POSITIVE AIRWAY PRESSURE: ICD-10-PCS | Performed by: INTERNAL MEDICINE

## 2021-05-18 PROCEDURE — 96365 THER/PROPH/DIAG IV INF INIT: CPT

## 2021-05-18 PROCEDURE — 272N000064 HC CIRCUIT HUMIDITY W/CPAP BIPAP

## 2021-05-18 PROCEDURE — 258N000003 HC RX IP 258 OP 636: Performed by: EMERGENCY MEDICINE

## 2021-05-18 PROCEDURE — 250N000011 HC RX IP 250 OP 636: Performed by: EMERGENCY MEDICINE

## 2021-05-18 PROCEDURE — 87086 URINE CULTURE/COLONY COUNT: CPT | Performed by: EMERGENCY MEDICINE

## 2021-05-18 PROCEDURE — 84443 ASSAY THYROID STIM HORMONE: CPT | Performed by: EMERGENCY MEDICINE

## 2021-05-18 PROCEDURE — 999N000185 HC STATISTIC TRANSPORT TIME EA 15 MIN

## 2021-05-18 PROCEDURE — 85025 COMPLETE CBC W/AUTO DIFF WBC: CPT | Performed by: EMERGENCY MEDICINE

## 2021-05-18 PROCEDURE — 250N000013 HC RX MED GY IP 250 OP 250 PS 637: Performed by: EMERGENCY MEDICINE

## 2021-05-18 PROCEDURE — 87635 SARS-COV-2 COVID-19 AMP PRB: CPT | Performed by: EMERGENCY MEDICINE

## 2021-05-18 PROCEDURE — 99310 SBSQ NF CARE HIGH MDM 45: CPT | Performed by: NURSE PRACTITIONER

## 2021-05-18 PROCEDURE — 93005 ELECTROCARDIOGRAM TRACING: CPT

## 2021-05-18 PROCEDURE — 83880 ASSAY OF NATRIURETIC PEPTIDE: CPT | Performed by: EMERGENCY MEDICINE

## 2021-05-18 PROCEDURE — 96366 THER/PROPH/DIAG IV INF ADDON: CPT

## 2021-05-18 PROCEDURE — 71275 CT ANGIOGRAPHY CHEST: CPT

## 2021-05-18 PROCEDURE — 06H03DZ INSERTION OF INTRALUMINAL DEVICE INTO INFERIOR VENA CAVA, PERCUTANEOUS APPROACH: ICD-10-PCS | Performed by: RADIOLOGY

## 2021-05-18 PROCEDURE — 999N000157 HC STATISTIC RCP TIME EA 10 MIN

## 2021-05-18 PROCEDURE — 99285 EMERGENCY DEPT VISIT HI MDM: CPT | Mod: 25

## 2021-05-18 PROCEDURE — C9803 HOPD COVID-19 SPEC COLLECT: HCPCS

## 2021-05-18 PROCEDURE — 83605 ASSAY OF LACTIC ACID: CPT

## 2021-05-18 RX ORDER — IOPAMIDOL 755 MG/ML
79 INJECTION, SOLUTION INTRAVASCULAR ONCE
Status: COMPLETED | OUTPATIENT
Start: 2021-05-18 | End: 2021-05-18

## 2021-05-18 RX ORDER — PIPERACILLIN SODIUM, TAZOBACTAM SODIUM 4; .5 G/20ML; G/20ML
4.5 INJECTION, POWDER, LYOPHILIZED, FOR SOLUTION INTRAVENOUS ONCE
Status: COMPLETED | OUTPATIENT
Start: 2021-05-18 | End: 2021-05-19

## 2021-05-18 RX ORDER — ACETAMINOPHEN 500 MG
500 TABLET ORAL ONCE
Status: COMPLETED | OUTPATIENT
Start: 2021-05-18 | End: 2021-05-18

## 2021-05-18 RX ADMIN — ACETAMINOPHEN 500 MG: 500 TABLET, FILM COATED ORAL at 20:50

## 2021-05-18 RX ADMIN — SODIUM CHLORIDE 500 ML: 9 INJECTION, SOLUTION INTRAVENOUS at 20:49

## 2021-05-18 RX ADMIN — SODIUM CHLORIDE 100 ML: 9 INJECTION, SOLUTION INTRAVENOUS at 21:56

## 2021-05-18 RX ADMIN — PIPERACILLIN SODIUM AND TAZOBACTAM SODIUM 4.5 G: 4; .5 INJECTION, POWDER, LYOPHILIZED, FOR SOLUTION INTRAVENOUS at 22:13

## 2021-05-18 RX ADMIN — IOPAMIDOL 79 ML: 755 INJECTION, SOLUTION INTRAVENOUS at 21:56

## 2021-05-18 ASSESSMENT — ENCOUNTER SYMPTOMS
FEVER: 1
COUGH: 0
DIZZINESS: 0
NAUSEA: 0
SHORTNESS OF BREATH: 0

## 2021-05-18 ASSESSMENT — MIFFLIN-ST. JEOR: SCORE: 1818.06

## 2021-05-18 NOTE — PROGRESS NOTES
Clinic Care Coordination Contact    Situation: Patient chart reviewed by care coordinator.  Mr. Ron Gilmore is a 80-year-old gentleman with complex past medical history of CVA with residual left-sided weakness, COPD, chronic urinary retention with chronic indwelling Cardona catheter, history of septic shock secondary to E. 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  Also history of dyslipidemia, depression, diabetes mellitus type 2 diet-controlled and dysphagia and recent COVID-19 infection who was sent from TCU for evaluation of shortness of breath and hypoxia.   Background:     Assessment:     Plan/Recommendations: Patient sent to [facility], [facility] to follow up in one week.

## 2021-05-18 NOTE — LETTER
Transition Communication Hand-off for Care Transitions to Next Level of Care Provider    Name: Ron Gilmore  : 1940  MRN #: [id number]  Primary Care Provider: Augustin Thomas     Primary Clinic: [address]  [place] [address]     Reason for Hospitalization:  Acute and chronic respiratory failure with hypoxia (H) [J96.21]  Admit Date/Time: 2021  7:26 PM  Discharge Date: 21  Payor Source: Payor: [facility] / Plan: ARE [facility]IC[facility] NON [facility] / Product Type: HMO /     Readmission Assessment Measure (RAJ) Risk Score/category: 37%    Reason for Communication Hand-off Referral: Other Pt to d/c TCU at [facility] 6/3    Discharge Plan:  Discharge Plan:      Most Recent Value   Disposition Comments  Pt to discharge back to [facility] today via [facility] stretcher transport. Pt is in need of a stretcher due to no trunk support, coccyx wound, and new oxygen he is unsure on how to manage.           Concern for non-adherence with plan of care: No     Discharge Needs Assessment:  Needs      Most Recent Value   Equipment Currently Used at Home  wheelchair, power, lift device, hospital bed   # of Referrals Placed by [facility]  Internal Clinic Care Coordination, Post Acute Facilities, Transportation, Communication hand-offs to next level of Care Providers          Already enrolled in Tele-monitoring program and name of program:  No  Follow-up specialty is recommended: Yes    Follow-up plan:    Future Appointments   Date Time Provider Department Center   2021 12:00 AM [facility]       Any outstanding tests or procedures:              Key Recommendations:      TIFFANY Ruiz    AVS/Discharge Summary is the source of truth; this is a helpful guide for improved communication of patient story

## 2021-05-18 NOTE — PROGRESS NOTES
[facility]    PRIMARY CARE PROVIDER AND CLINIC:  Augustin Thomas MD, [address] / [place] [address]  Chief Complaint   Patient presents with     Hospital F/U     [facility] Medical Record Number:  [id number]  Place of Service where encounter took place:  [facility] [[id number]]    Ron Gilmore  is a 80 year old  (1940), returned to the above facility from  [facility]. Hospital stay 5/10/21 - 5/17/21. .  Admitted to this facility for  rehab, medical management and nursing care.    HPI:    HPI information obtained from: facility chart records, facility staff, patient report and [facility] chart review.     Brief Summary of Hospital Course:     PMH: hx CVA with residual left-sided weakness, COPD, depression, dysphagia, dyslipidemia, depression, diabetes mellitus type 2 diet-controlled     Patient admitted to [facility] 5/1-5/4/21 due to SOB and vomiting. Tested + COVID on 5/1. Was placed on 4L O2. CXR + shallow inspiration, no clear infiltrates. Received 1st COVID vaccine previously, missed 2nd dose due to hospitalization in 03/2021. Was treated with dexamethasone and remdesevir. Per hospital notes, had low d-dimer 0.6 and short term anticoagulation prophylaxis was not indicated. Also noted to have UTI associated with chronic indwelling catheter, UC + E faecalis and was started on IV ceftriaxone then transitioned to course of ciprofloxacin upon discharge. [facility]     Readmitted to [facility] 5/10-5/17/21 due to acute respiratory failure due to dysphagia. CT chest negative for PE. CXR + bilateral infiltrates, was started on zosyn and macrobid for pneumonia and also noted to have catheter associated UTI. [facility]     [facility].       Updates on Status Since Skilled nursing Admission:     During exam, patient seen resting in bed. Reports doing well. Endorses ready to go home,  doesn't know why he is here. States he was primarily wheelchair bound at baseline and wants to be home w/spouse. Admits to good appetite. Sleeping well at night. Denies constipation, diarrhea. Denies chest pain, SOB, headache, syncope.SW following for discharge planning.         CODE STATUS/ADVANCE DIRECTIVES DISCUSSION:   CPR/Full code   Patient's living condition: lives with spouse  ALLERGIES: Patient has no known allergies.  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 He also has no past medical history of Asymptomatic human immunodeficiency virus (HIV) infection status (H), Blood in semen, Cerebral palsy (H), Complication of anesthesia, Congenital renal agenesis and dysgenesis, Difficult intubation, Epididymitis, bilateral, Epididymitis, left, Epididymitis, right, Goiter, Hernia, abdominal, History of spinal cord injury, History of thrombophlebitis, Horseshoe kidney, Hydrocephalus (H), Malignant hyperthermia, Mumps, Orchitis, epididymitis, and epididymo-orchitis, with abscess, Palpitations, Parkinsons disease (H), Penile discharge, PONV (postoperative nausea and vomiting), Prostate infection, Spider veins, Spina bifida (H), Spinal headache, STD (sexually transmitted disease), Swelling of testicle, Tethered cord (H), or Tuberculosis.  PAST SURGICAL HISTORY:   has a past surgical history that includes Laminectomy lumbar one level; joint replacement (Right); knee surgery (Bilateral); Appendectomy open; Tonsillectomy; Arthroscopy shoulder rotator  cuff repair; cataracts (Bilateral); Cystoscopy (10/19/2011); Cholecystectomy; Eye surgery; colonoscopy; Cystoscopy, transurethral resection (TUR) prostate, combined (N/A, 2/21/2018); Electrophysiology Loop Recorder Implant (N/A, 1/20/2020); IR Nephrostomy Tube Placement Right (3/9/2021); IR Ureteral Stent Placement Right (3/16/2021); and Laser holmium lithotripsy ureter(s), insert stent, combined (Right, 4/14/2021).  FAMILY HISTORY: family history includes Prostate Cancer in his father.  SOCIAL HISTORY:   reports that he has quit smoking. He has never used smokeless tobacco. He reports that he does not drink alcohol or use drugs.    Post Discharge Medication Reconciliation Status: discharge medications reconciled and changed, per note/orders    Current Outpatient Medications   Medication Sig Dispense Refill     acetaminophen (TYLENOL) 325 MG tablet Take 650 mg by mouth every 4 hours as needed for mild pain as needed for pain/fever Max dose 3000mg/24hr       allopurinol (ZYLOPRIM) 300 MG tablet Take 300 mg by mouth daily       aspirin (ASA) 325 MG EC tablet Take 1 tablet (325 mg) by mouth daily       atorvastatin (LIPITOR) 10 MG tablet Take 10 mg by mouth daily       cyanocobalamin (VITAMIN B-12) 500 MCG SUBL sublingual tablet Place 1 tablet (500 mcg) under the tongue daily       Emollient (AMLACTIN ULTRA EX) Apply topically daily as needed TO FEET       furosemide (LASIX) 40 MG tablet Take 1 tablet (40 mg) by mouth daily       ipratropium-albuterol (COMBIVENT RESPIMAT)  MCG/ACT inhaler Inhale 1 puff into the lungs 4 times daily       metoprolol tartrate (LOPRESSOR) 25 MG tablet Take 0.5 tablets (12.5 mg) by mouth 2 times daily       PARoxetine (PAXIL) 20 MG tablet Take 20 mg by mouth daily       polyethylene glycol (MIRALAX) 17 GM/Dose powder Take 17 g by mouth daily 510 g          ROS:  10 point ROS of systems including Constitutional, Eyes, Respiratory, Cardiovascular, Gastroenterology, Genitourinary,  Integumentary, Musculoskeletal, Psychiatric were all negative except for pertinent positives noted in my HPI.      Vitals:  BP 99/72   Pulse 78   Temp 98.2  F (36.8  C)   Resp 16   Ht 1.829 m (6')   Wt 106 kg (233 lb 11.2 oz)   SpO2 96%   BMI 31.70 kg/m    Exam:  Limited observational exam due to COVID19 precautions  GENERAL APPEARANCE:  Alert, oriented, cooperative. Hoarse, soft voice.   ENT:  Mouth and posterior oropharynx normal, moist mucous membranes, [place]  EYES:  EOM, conjunctivae, lids, pupils and irises normal, PERRL  RESP:  Poor respiratory effort and palpation of chest normal, diminished breath sounds throughout  CV:  Palpation and auscultation of heart done , regular rate and rhythm, no murmur, rub, or gallop, no edema  M/S:   Gait and station abnormal, wheelchair bound. Uses aleks lift for transfers. Digits and nails normal  SKIN:  Inspection of skin and subcutaneous tissue baseline  NEURO:   Cranial nerves 2-12 are normal tested and grossly at patient's baseline  PSYCH:  Oriented X 3, affect and mood normal    Wt Readings from Last 4 Encounters:   05/18/21 106 kg (233 lb 11.2 oz)   05/17/21 112 kg (246 lb 14.4 oz)   05/10/21 106 kg (233 lb 11.2 oz)   05/06/21 117.8 kg (259 lb 12.8 oz)       Lab/Diagnostic data:  Recent labs in [facility] reviewed by me today.  and   Most Recent 3 CBC's:  Recent Labs   Lab Test 05/17/21  0657 05/15/21  1155 05/14/21  0710 05/12/21  0740 05/10/21  2110   WBC  --  7.2  --  6.5 8.3   HGB 9.6* 9.4*  --  10.0* 11.3*   MCV  --  89  --  89 87    198 176 161 173     Most Recent 3 BMP's:  Recent Labs   Lab Test 05/17/21  0657 05/15/21  0703 05/14/21  0710 05/13/21  0814   NA  --  145* 144 143   POTASSIUM  --  4.0 3.3* 3.4   CHLORIDE  --  113* 113* 111*   CO2  --  28 27 27   BUN  --  19 23 26   CR 1.07 1.05 1.26* 1.32*   ANIONGAP  --  4 4 5   RAJ  --  8.1* 8.1* 8.1*   GLC  --  88 98 87       Lab Results   Component Value Date    A1C 6.0 05/11/2021    A1C 6.0 02/08/2021     "A1C 6.0 01/14/2020    A1C 5.9 07/14/2006       TSH   Date Value Ref Range Status   05/10/2021 0.60 0.40 - 4.00 mU/L Final       Last EKG 5/10/21:           ASSESSMENT/PLAN:    (J96.21) Acute on chronic respiratory failure with hypoxia (H)  (primary encounter diagnosis)  (J44.9) Chronic obstructive pulmonary disease, unspecified COPD type (H)  Comment: Chronic respiratory failure. Recent CXR w/bialteral infiltrates, concerning for pneumonia vs CHF. Completed course of zosyn and macrobid during hospital stay. Also hx sleep apnea, noncompliant with CPAP. Hx COVID 5/1/21.   Plan:   - Check CBC & CMP 5/20/21   - Repeat CXR in 2 weeks  - Requires 2L O2 continuously  - Monitor respiratory status and O2 sats, keep O2 > 90%  - Patient verbalizes understanding and agrees with treatment plan.     (I69.391) Dysphagia due to recent cerebrovascular accident (CVA)  Comment: Chronic dysphagia r/t prior CVA  Plan:   - ST upgraded diet to DD2 w/nectar thick liquids (states baseline diet regular w/nectar thick liquids)   - Monitor intake and respiratory status due to high risk for aspiration  - Patient verbalizes understanding and agrees with treatment plan.     (I48.0) Paroxysmal atrial fibrillation (H)  Comment: Paroxysmal atrial fibrillation. Per chart review, noted to have atrial fibrillation in setting of sepsis in 03/2021 and converted back to NSR. States patient has loop recorder in place and has device check coming up, recommended at that time to follow-up with Cardiologist. Noted to have loop recorder check on 3/26/21, which found \"7 AF episodes logged. 5 of the EGMs show SR with frequent PVCs. 2 EGM's show AF with median V rates in 110-130's lasted 4 min to 6h8m. These occurred during hospitalization for septic shock. He is not on anticoagulation. His loop recorder was implanted for cryptogenic stroke.\"  Plan:   - Continue ASA 325mg every day   - Patient to follow-up with Dr. Parisi (Cardiologist) as directed to review " treatment plan for atrial fibrillation    (E66.01) Morbid obesity, unspecified obesity type (H)  (I69.354) Hemiparesis affecting left side as late effect of cerebrovascular accident (CVA) (H)  (R53.81) Physical deconditioning  Comment: Chronic left-sided hemiparesis r/t prior CVA. Chronic morbid obesity w/chronic physical deconditioning. PTA lives w/spouse. Nonambulatory, wheelchair bound. Uses aleks lift for transfers. Requires assistance with ADLs.  Plan:   - Encourage active participation in therapy session to increase strength and promote independence in activities and ADLs.   - Cognitive testing to be done in therapy.   - SW following for discharge planning. Goal is to discharge home.  - Patient verbalizes understanding and agrees with treatment plan.     (N40.1,  R39.14) Benign prostatic hyperplasia with incomplete bladder emptying  Comment: Chronic chirinos catheter due to BPH  Plan:   - Continue catheter care and monitor urine ouptut  - Patient to follow-up with Dr. Sullivan as directed            Total time spent with patient visit at the skilled nursing facility was 39 mins including patient visit and review of past records. Greater than 50% of total time (24 minutes) spent with counseling patient regarding treatment plan, medication management, follow-up labs, and follow-up appointments. Patient verbalizes understanding and agrees with treatment plan. Coordinating care with SW regarding discharge planning.     Electronically signed by:  ELIZABETH Newsome CNP

## 2021-05-18 NOTE — PLAN OF CARE
Speech Language Therapy Discharge Summary    Reason for therapy discharge:    Discharged to transitional care facility.    Progress towards therapy goal(s). See goals on Care Plan in [facility] electronic health record for goal details.  Goals not met.  Barriers to achieving goals:   discharge from facility.    Therapy recommendation(s):    Continued therapy is recommended.  Rationale/Recommendations:  Continue ST needs for least restrictive diet. Discharged on a DDL 2 with nectar thick liquids.

## 2021-05-19 ENCOUNTER — APPOINTMENT (OUTPATIENT)
Dept: SPEECH THERAPY | Facility: CLINIC | Age: 81
DRG: 871 | End: 2021-05-19
Attending: HOSPITALIST
Payer: COMMERCIAL

## 2021-05-19 ENCOUNTER — APPOINTMENT (OUTPATIENT)
Dept: ULTRASOUND IMAGING | Facility: CLINIC | Age: 81
DRG: 871 | End: 2021-05-19
Attending: HOSPITALIST
Payer: COMMERCIAL

## 2021-05-19 LAB
CRP SERPL-MCNC: 67.2 MG/L (ref 0–8)
D DIMER PPP FEU-MCNC: 1.7 UG/ML FEU (ref 0–0.5)
GLUCOSE BLDC GLUCOMTR-MCNC: 112 MG/DL (ref 70–99)
LACTATE BLD-SCNC: 1.3 MMOL/L (ref 0.7–2)
PROCALCITONIN SERPL-MCNC: <0.05 NG/ML
TSH SERPL DL<=0.005 MIU/L-ACNC: 1.95 MU/L (ref 0.4–4)

## 2021-05-19 PROCEDURE — 999N000157 HC STATISTIC RCP TIME EA 10 MIN

## 2021-05-19 PROCEDURE — 120N000013 HC R&B IMCU

## 2021-05-19 PROCEDURE — 85379 FIBRIN DEGRADATION QUANT: CPT | Performed by: HOSPITALIST

## 2021-05-19 PROCEDURE — 83605 ASSAY OF LACTIC ACID: CPT | Performed by: INTERNAL MEDICINE

## 2021-05-19 PROCEDURE — 250N000011 HC RX IP 250 OP 636: Performed by: HOSPITALIST

## 2021-05-19 PROCEDURE — 250N000013 HC RX MED GY IP 250 OP 250 PS 637: Performed by: INTERNAL MEDICINE

## 2021-05-19 PROCEDURE — 92526 ORAL FUNCTION THERAPY: CPT | Mod: GN | Performed by: SPEECH-LANGUAGE PATHOLOGIST

## 2021-05-19 PROCEDURE — 250N000009 HC RX 250: Performed by: INTERNAL MEDICINE

## 2021-05-19 PROCEDURE — 36415 COLL VENOUS BLD VENIPUNCTURE: CPT | Performed by: HOSPITALIST

## 2021-05-19 PROCEDURE — 250N000011 HC RX IP 250 OP 636: Performed by: INTERNAL MEDICINE

## 2021-05-19 PROCEDURE — 999N001017 HC STATISTIC GLUCOSE BY METER IP

## 2021-05-19 PROCEDURE — 258N000003 HC RX IP 258 OP 636: Performed by: EMERGENCY MEDICINE

## 2021-05-19 PROCEDURE — 84145 PROCALCITONIN (PCT): CPT | Performed by: HOSPITALIST

## 2021-05-19 PROCEDURE — 94660 CPAP INITIATION&MGMT: CPT

## 2021-05-19 PROCEDURE — 36415 COLL VENOUS BLD VENIPUNCTURE: CPT | Performed by: INTERNAL MEDICINE

## 2021-05-19 PROCEDURE — 92610 EVALUATE SWALLOWING FUNCTION: CPT | Mod: GN | Performed by: SPEECH-LANGUAGE PATHOLOGIST

## 2021-05-19 PROCEDURE — 86140 C-REACTIVE PROTEIN: CPT | Performed by: HOSPITALIST

## 2021-05-19 PROCEDURE — 93970 EXTREMITY STUDY: CPT

## 2021-05-19 PROCEDURE — 99223 1ST HOSP IP/OBS HIGH 75: CPT | Mod: AI | Performed by: INTERNAL MEDICINE

## 2021-05-19 RX ORDER — ATORVASTATIN CALCIUM 10 MG/1
10 TABLET, FILM COATED ORAL DAILY
Status: DISCONTINUED | OUTPATIENT
Start: 2021-05-19 | End: 2021-06-03 | Stop reason: HOSPADM

## 2021-05-19 RX ORDER — NITROGLYCERIN 0.4 MG/1
0.4 TABLET SUBLINGUAL EVERY 5 MIN PRN
Status: DISCONTINUED | OUTPATIENT
Start: 2021-05-19 | End: 2021-05-25

## 2021-05-19 RX ORDER — FUROSEMIDE 40 MG
40 TABLET ORAL DAILY
Status: DISCONTINUED | OUTPATIENT
Start: 2021-05-19 | End: 2021-06-03 | Stop reason: HOSPADM

## 2021-05-19 RX ORDER — ALLOPURINOL 300 MG/1
300 TABLET ORAL DAILY
Status: DISCONTINUED | OUTPATIENT
Start: 2021-05-19 | End: 2021-06-03 | Stop reason: HOSPADM

## 2021-05-19 RX ORDER — ACETAMINOPHEN 325 MG/1
650 TABLET ORAL EVERY 4 HOURS PRN
Status: DISCONTINUED | OUTPATIENT
Start: 2021-05-19 | End: 2021-06-03 | Stop reason: HOSPADM

## 2021-05-19 RX ORDER — PAROXETINE 20 MG/1
20 TABLET, FILM COATED ORAL DAILY
Status: DISCONTINUED | OUTPATIENT
Start: 2021-05-19 | End: 2021-06-03 | Stop reason: HOSPADM

## 2021-05-19 RX ORDER — ASPIRIN 81 MG/1
81 TABLET ORAL DAILY
Status: DISCONTINUED | OUTPATIENT
Start: 2021-05-20 | End: 2021-06-03 | Stop reason: HOSPADM

## 2021-05-19 RX ORDER — PIPERACILLIN SODIUM, TAZOBACTAM SODIUM 3; .375 G/15ML; G/15ML
3.38 INJECTION, POWDER, LYOPHILIZED, FOR SOLUTION INTRAVENOUS EVERY 6 HOURS
Status: DISCONTINUED | OUTPATIENT
Start: 2021-05-19 | End: 2021-05-20

## 2021-05-19 RX ORDER — ONDANSETRON 4 MG/1
4 TABLET, ORALLY DISINTEGRATING ORAL EVERY 6 HOURS PRN
Status: DISCONTINUED | OUTPATIENT
Start: 2021-05-19 | End: 2021-06-03 | Stop reason: HOSPADM

## 2021-05-19 RX ORDER — ONDANSETRON 2 MG/ML
4 INJECTION INTRAMUSCULAR; INTRAVENOUS EVERY 6 HOURS PRN
Status: DISCONTINUED | OUTPATIENT
Start: 2021-05-19 | End: 2021-06-03 | Stop reason: HOSPADM

## 2021-05-19 RX ORDER — LIDOCAINE 40 MG/G
CREAM TOPICAL
Status: DISCONTINUED | OUTPATIENT
Start: 2021-05-19 | End: 2021-05-19

## 2021-05-19 RX ORDER — LIDOCAINE 40 MG/G
CREAM TOPICAL
Status: DISCONTINUED | OUTPATIENT
Start: 2021-05-19 | End: 2021-06-03 | Stop reason: HOSPADM

## 2021-05-19 RX ORDER — POLYETHYLENE GLYCOL 3350 17 G/17G
17 POWDER, FOR SOLUTION ORAL DAILY
Status: DISCONTINUED | OUTPATIENT
Start: 2021-05-19 | End: 2021-06-03 | Stop reason: HOSPADM

## 2021-05-19 RX ADMIN — PIPERACILLIN SODIUM AND TAZOBACTAM SODIUM 3.38 G: 3; .375 INJECTION, POWDER, LYOPHILIZED, FOR SOLUTION INTRAVENOUS at 04:48

## 2021-05-19 RX ADMIN — ALLOPURINOL 300 MG: 300 TABLET ORAL at 09:16

## 2021-05-19 RX ADMIN — ATORVASTATIN CALCIUM 10 MG: 10 TABLET, FILM COATED ORAL at 09:16

## 2021-05-19 RX ADMIN — FUROSEMIDE 40 MG: 40 TABLET ORAL at 09:16

## 2021-05-19 RX ADMIN — IPRATROPIUM BROMIDE AND ALBUTEROL 1 PUFF: 20; 100 SPRAY, METERED RESPIRATORY (INHALATION) at 17:19

## 2021-05-19 RX ADMIN — IPRATROPIUM BROMIDE AND ALBUTEROL 1 PUFF: 20; 100 SPRAY, METERED RESPIRATORY (INHALATION) at 12:55

## 2021-05-19 RX ADMIN — METOPROLOL TARTRATE 12.5 MG: 25 TABLET, FILM COATED ORAL at 09:16

## 2021-05-19 RX ADMIN — ASPIRIN 325 MG: 325 TABLET, COATED ORAL at 09:16

## 2021-05-19 RX ADMIN — SODIUM CHLORIDE 500 ML: 9 INJECTION, SOLUTION INTRAVENOUS at 00:14

## 2021-05-19 RX ADMIN — PIPERACILLIN SODIUM AND TAZOBACTAM SODIUM 3.38 G: 3; .375 INJECTION, POWDER, LYOPHILIZED, FOR SOLUTION INTRAVENOUS at 21:54

## 2021-05-19 RX ADMIN — Medication 500 MCG: at 09:16

## 2021-05-19 RX ADMIN — PAROXETINE HYDROCHLORIDE 20 MG: 20 TABLET, FILM COATED ORAL at 09:16

## 2021-05-19 RX ADMIN — ENOXAPARIN SODIUM 105 MG: 120 INJECTION SUBCUTANEOUS at 17:14

## 2021-05-19 RX ADMIN — ENOXAPARIN SODIUM 40 MG: 40 INJECTION SUBCUTANEOUS at 04:48

## 2021-05-19 RX ADMIN — IPRATROPIUM BROMIDE AND ALBUTEROL 1 PUFF: 20; 100 SPRAY, METERED RESPIRATORY (INHALATION) at 09:17

## 2021-05-19 RX ADMIN — PIPERACILLIN SODIUM AND TAZOBACTAM SODIUM 3.38 G: 3; .375 INJECTION, POWDER, LYOPHILIZED, FOR SOLUTION INTRAVENOUS at 10:26

## 2021-05-19 RX ADMIN — PIPERACILLIN SODIUM AND TAZOBACTAM SODIUM 3.38 G: 3; .375 INJECTION, POWDER, LYOPHILIZED, FOR SOLUTION INTRAVENOUS at 16:47

## 2021-05-19 RX ADMIN — METOPROLOL TARTRATE 12.5 MG: 25 TABLET, FILM COATED ORAL at 21:54

## 2021-05-19 ASSESSMENT — ACTIVITIES OF DAILY LIVING (ADL)
WALKING_OR_CLIMBING_STAIRS: TRANSFERRING DIFFICULTY, DEPENDENT
DRESSING/BATHING: DRESSING DIFFICULTY, DEPENDENT
CONCENTRATING,_REMEMBERING_OR_MAKING_DECISIONS_DIFFICULTY: NO
EQUIPMENT_CURRENTLY_USED_AT_HOME: WHEELCHAIR, POWER
WEAR_GLASSES_OR_BLIND: YES
HEARING_DIFFICULTY_OR_DEAF: NO
TOILETING_ISSUES: YES
ADLS_ACUITY_SCORE: 24
EATING/SWALLOWING: SWALLOWING LIQUIDS;SWALLOWING SOLID FOOD
ADLS_ACUITY_SCORE: 24
DIFFICULTY_COMMUNICATING: NO
WALKING_OR_CLIMBING_STAIRS_DIFFICULTY: YES
DRESSING/BATHING_DIFFICULTY: YES
DIFFICULTY_EATING/SWALLOWING: YES
FALL_HISTORY_WITHIN_LAST_SIX_MONTHS: NO
ADLS_ACUITY_SCORE: 27
ADLS_ACUITY_SCORE: 24
DOING_ERRANDS_INDEPENDENTLY_DIFFICULTY: YES
ADLS_ACUITY_SCORE: 27

## 2021-05-19 NOTE — ED NOTES
Bed: ST03  Expected date:   Expected time:   Means of arrival:   Comments:  531  80M SOB/Wet LS/Fever/CPAP  1927

## 2021-05-19 NOTE — PLAN OF CARE
VSS on high flow at 30LPM at 40% FiO2. Tele NSR. A/Ox4. Neurologically intact except LUE and LLE weakness and paralysis from prior CVA. Pressure injury present . Denies pain. Repositioned q2h, pulsate mattress .  DD2 with nectar thickened liquid diet, pills with applesauce, speech consulted for choking but okayed for diet. BS active, Flatus +. Voiding adequately to chirinos catheter. LS diminished. BLE US done today showed DVT in right calf, now on lovenox, will monitor.

## 2021-05-19 NOTE — PROGRESS NOTES
Patient was taken off of BiPAP this morning and was trialed on a 6L NC, but SpO2 was only 89%, so pt was placed back on BiPAP 12/6 40% with SpO2 in the mid 90's.  Patient was then placed on HFNC 35 LPM FiO2 55% with SpO2 in the mid 90's. Mepilex and opti foam in place. Skin intact.  Will cont to follow.  5/19/2021  Saritha Harvey, RT

## 2021-05-19 NOTE — ED NOTES
"[facility]  ED Nurse Handoff Report    ED Chief complaint: Respiratory Distress      ED Diagnosis:   Final diagnoses:   None       Code Status: as per hospitalist    Allergies: No Known Allergies    Patient Story: recently discharged to [facility] after a 2 week hospitalization of COVID pneumonia. Per EMS pt hypoxic (88% on 4L NC), fever and with \"wet\" lung sounds. Pt has hx. Of COPD, Stroke, HTN, DM  Focused Assessment:  Alert, Ox3. Respirations fast, labored, rales. Placed on a CPap by EMS. Skin hot to touch, diaphoretic at arrival. Has a chirinos.  Results for orders placed or performed during the hospital encounter of 05/18/21   XR Chest Port 1 View     Status: None    Narrative    CHEST ONE VIEW  5/18/2021 7:53 PM     HISTORY: Respiratory distress.    COMPARISON: May 15, 2021      Impression    IMPRESSION: Similar interstitial change, previously seen infiltrates  appear improved.    BRISSA MENESES MD   CT Chest Pulmonary Embolism w Contrast     Status: None (Preliminary result)    Narrative    EXAM: CT CHEST WITH CONTRAST - PULMONARY EMBOLISM PROTOCOL   LOCATION: [facility]  DATE/TIME: 5/18/2021 10:01 PM    INDICATION: Respiratory distress. Positive D-dimer.  COMPARISON: 05/11/2021-CT chest, abdomen and pelvis.    TECHNIQUE: CT angiogram chest during pulmonary arterial phase injection IV contrast. Dose reduction techniques were used.   CONTRAST: 79mL Isovue-370    FINDINGS:    ANGIOGRAM CHEST: Note the detection of segmental and subsegmental pulmonary emboli is limited due to motion artifact. No visualized embolus within the main or lobar pulmonary arteries.. The thoracic aorta is normal in caliber. Atherosclerotic   calcification in the thoracic aorta.    LUNGS AND PLEURA: Moderate emphysematous changes in the lungs. Several irregular curvilinear opacities in the posterior aspects of the upper and midlungs bilaterally are again noted and most likely related to " scarring. A few patchy opacities are present   in the dependent aspects of bilateral lower lobes.    MEDIASTINUM/AXILLAE: A few nonspecific borderline enlarged mediastinal lymph nodes, not convincingly changed. For example, there is a 2.0 x 0.9 cm lower right paratracheal lymphadenopathy (series 4 image 41).    CORONARY ARTERY CALCIFICATION: Present.    MUSCULOSKELETAL: No acute findings.    UPPER ABDOMEN: Prior cholecystectomy.      Impression    IMPRESSION:   1. A few patchy opacities in the dependent aspects of bilateral lower lobes could represent atelectasis or pneumonia.  2. No visualized embolus in the main or lobar pulmonary arteries. Detection of segmental and subsegmental pulmonary emboli is limited due to motion artifact.  3. Moderate emphysematous changes in the lungs.   CBC with platelets differential     Status: Abnormal   Result Value Ref Range    WBC 8.5 4.0 - 11.0 10e9/L    RBC Count 4.00 (L) 4.4 - 5.9 10e12/L    Hemoglobin 10.5 (L) 13.3 - 17.7 g/dL    Hematocrit 35.3 (L) 40.0 - 53.0 %    MCV 88 78 - 100 fl    MCH 26.3 (L) 26.5 - 33.0 pg    MCHC 29.7 (L) 31.5 - 36.5 g/dL    RDW 16.9 (H) 10.0 - 15.0 %    Platelet Count 286 150 - 450 10e9/L    Diff Method Automated Method     % Neutrophils 72.4 %    % Lymphocytes 18.9 %    % Monocytes 6.0 %    % Eosinophils 1.9 %    % Basophils 0.2 %    % Immature Granulocytes 0.6 %    Nucleated RBCs 0 0 /100    Absolute Neutrophil 6.1 1.6 - 8.3 10e9/L    Absolute Lymphocytes 1.6 0.8 - 5.3 10e9/L    Absolute Monocytes 0.5 0.0 - 1.3 10e9/L    Absolute Eosinophils 0.2 0.0 - 0.7 10e9/L    Absolute Basophils 0.0 0.0 - 0.2 10e9/L    Abs Immature Granulocytes 0.1 0 - 0.4 10e9/L    Absolute Nucleated RBC 0.0    Basic metabolic panel     Status: Abnormal   Result Value Ref Range    Sodium 143 133 - 144 mmol/L    Potassium 3.4 3.4 - 5.3 mmol/L    Chloride 105 94 - 109 mmol/L    Carbon Dioxide 34 (H) 20 - 32 mmol/L    Anion Gap 4 3 - 14 mmol/L    Glucose 147 (H) 70 - 99 mg/dL     Urea Nitrogen 24 7 - 30 mg/dL    Creatinine 1.17 0.66 - 1.25 mg/dL    GFR Estimate 59 (L) >60 mL/min/[1.73_m2]    GFR Estimate If Black 68 >60 mL/min/[1.73_m2]    Calcium 8.8 8.5 - 10.1 mg/dL   Troponin I     Status: None   Result Value Ref Range    Troponin I ES <0.015 0.000 - 0.045 ug/L   Nt probnp inpatient     Status: None   Result Value Ref Range    N-Terminal Pro BNP Inpatient 795 0 - 1,800 pg/mL   Symptomatic SARS-CoV-2 COVID-19 Virus (Coronavirus) by PCR     Status: Abnormal    Specimen: Nasopharyngeal   Result Value Ref Range    SARS-CoV-2 Virus Specimen Source Nasopharyngeal     SARS-CoV-2 PCR Result POSITIVE (AA)     SARS-CoV-2 PCR Comment (Note)    Procalcitonin     Status: None   Result Value Ref Range    Procalcitonin <0.05 ng/ml   UA reflex to Microscopic and Culture     Status: Abnormal    Specimen: Catheterized Urine   Result Value Ref Range    Color Urine Light Yellow     Appearance Urine Slightly Cloudy     Glucose Urine Negative NEG^Negative mg/dL    Bilirubin Urine Negative NEG^Negative    Ketones Urine Negative NEG^Negative mg/dL    Specific Gravity Urine 1.012 1.003 - 1.035    Blood Urine Negative NEG^Negative    pH Urine 6.5 5.0 - 7.0 pH    Protein Albumin Urine 10 (A) NEG^Negative mg/dL    Urobilinogen mg/dL 0.0 0.0 - 2.0 mg/dL    Nitrite Urine Negative NEG^Negative    Leukocyte Esterase Urine Large (A) NEG^Negative    Source Catheterized Urine     RBC Urine 3 (H) 0 - 2 /HPF    WBC Urine 78 (H) 0 - 5 /HPF    WBC Clumps Present (A) NEG^Negative /HPF    Yeast Urine Moderate (A) NEG^Negative /HPF    Squamous Epithelial /HPF Urine <1 0 - 1 /HPF    Mucous Urine Present (A) NEG^Negative /LPF   EKG 12-lead, tracing only     Status: None   Result Value Ref Range    Interpretation ECG Click View Image link to view waveform and result    ISTAT gases lactate mine POCT     Status: Abnormal   Result Value Ref Range    Ph Venous 7.44 (H) 7.32 - 7.43 pH    PCO2 Venous 52 (H) 40 - 50 mm Hg    PO2 Venous  29 25 - 47 mm Hg    Bicarbonate Venous 35 (H) 21 - 28 mmol/L    O2 Sat Venous 56 %    Lactic Acid 1.7 0.7 - 2.1 mmol/L       Treatments and/or interventions provided: full cardiac monitor, sepsis work up.   Patient's response to treatments and/or interventions: afebrile, VSS, respirations unlabored, maintaining sats, pt comfortably resting in bed.    To be done/followed up on inpatient unit:  continue with POC    Does this patient have any cognitive concerns?: n/a    Activity level - Baseline/Home:  Total Care  Activity Level - Current:   Total Care    Patient's Preferred language: English   Needed?: No    Isolation: Contact  and COVID r/o and special precautions  Infection: VRE  COVID r/o and special precautions  Patient tested for COVID 19 prior to admission: YES  Bariatric?: No    Vital Signs:   Vitals:    05/18/21 1930 05/18/21 2000   BP: 123/61 103/63   Pulse: 130 128   Resp: 25 20   Temp: 99.5  F (37.5  C)    TempSrc: Axillary    SpO2: 95% 97%   Weight:  110 kg (242 lb 8.1 oz)       Cardiac Rhythm:Cardiac Rhythm: Sinus tachycardia    Was the PSS-3 completed:   Yes  What interventions are required if any?               Family Comments: pt by himself in ED  OBS brochure/video discussed/provided to patient/family: N/A           For the majority of the shift this patient's behavior was Green.   Behavioral interventions performed were n/a.    ED NURSE PHONE NUMBER: [phone]

## 2021-05-19 NOTE — PROGRESS NOTES
[facility]    Medicine Progress Note - Hospitalist Service       Date of Admission:  5/18/2021  Assessment & Plan       Hypoxic acute respiratory failure on HFNC  SIRS  Chronic obstructive lung disease   Sleep disordered breathing   Recent COVID-19 infection   Chronic indwelling urinary catheter   Hypertension   Intermittent atrial fibrillation not on anticoagulation   Chronic diastolic congestive heart failure   Type 2 diabetes mellitus   Depression   Stroke history     CT negative for pulmonary embolism in main arteries but segmental or smaller not ruled out due to motion artifact.  D-dimer 1.7.  Lungs showed emphysema and patchy lower lobe infiltrates but small.  No pulmonary edema.  Pulmonary embolism is still a concern.  He has another reason for anticoagulation- atrial fibrillation    Lower extremity ultrasound     Start full dose enoxaparin     Continue oxygen and other treatment per H&P     Diet: Combination Diet Regular Diet Adult; Dysphagia Diet Level 2: Mechan Altered; Nectar Thickened Liquids (pre-thickened or use instant food thickener)    DVT Prophylaxis: enoxaparin   Cardona Catheter: in place, indication: Wound Healing  Code Status: Full Code           Disposition Plan   Expected discharge: 2 - 3 days, recommended to transitional care unit once SIRS/Sepsis treated.  Entered: Barron Duque MD 05/19/2021, 2:58 PM       The patient's care was discussed with the Patient.    Barron Duque MD  Hospitalist Service  [facility]  Contact information available via [facility] Paging/Directory    ______________________________________________________________________    Interval History   Feels fine- no chest pain or shortness of breath or other specific complaint.      Data reviewed today: I reviewed all medications, new labs and imaging results over the last 24 hours. I personally reviewed no images or EKG's today.    Physical Exam   Vital Signs:  Temp: 98.4  F (36.9  C) Temp src: Oral BP: 104/75 Pulse: 93   Resp: 16 SpO2: 93 % O2 Device: High Flow Nasal Cannula (HFNC) Oxygen Delivery: 30 LPM  Weight: 242 lbs 8.1 oz  Constitutional: awake, alert, cooperative, no apparent distress  Respiratory: No increased work of breathing, good air exchange, clear to auscultation bilaterally, no crackles or wheezing  Cardiovascular: regular rate and rhythm, normal S1 and S2, no S3 or S4, and no murmur noted  Musculoskeletal: no significant edema, no calf pain    Data   Recent Labs   Lab 05/18/21  1938 05/17/21  0657 05/15/21  1155 05/15/21  0703 05/14/21  0710   WBC 8.5  --  7.2  --   --    HGB 10.5* 9.6* 9.4*  --   --    MCV 88  --  89  --   --     251 198  --  176     --   --  145* 144   POTASSIUM 3.4  --   --  4.0 3.3*   CHLORIDE 105  --   --  113* 113*   CO2 34*  --   --  28 27   BUN 24  --   --  19 23   CR 1.17 1.07  --  1.05 1.26*   ANIONGAP 4  --   --  4 4   RAJ 8.8  --   --  8.1* 8.1*   *  --   --  88 98   TROPI <0.015  --   --   --   --      Recent Results (from the past 24 hour(s))   XR Chest Port 1 View    Narrative    CHEST ONE VIEW  5/18/2021 7:53 PM     HISTORY: Respiratory distress.    COMPARISON: May 15, 2021      Impression    IMPRESSION: Similar interstitial change, previously seen infiltrates  appear improved.    BRISSA MENESES MD   CT Chest Pulmonary Embolism w Contrast    Narrative    EXAM: CT CHEST WITH CONTRAST - PULMONARY EMBOLISM PROTOCOL   LOCATION: [facility]  DATE/TIME: 5/18/2021 10:01 PM    INDICATION: Respiratory distress. Positive D-dimer.  COMPARISON: 05/11/2021 - CT chest, abdomen and pelvis.    TECHNIQUE: CT angiogram chest during pulmonary arterial phase injection IV contrast. Dose reduction techniques were used.   CONTRAST: 79mL Isovue-370    FINDINGS:    ANGIOGRAM CHEST: Note the detection of segmental and subsegmental pulmonary emboli is limited due to motion artifact. No visualized embolus within the  main or lobar pulmonary arteries. The thoracic aorta is normal in caliber. Atherosclerotic   calcification in the thoracic aorta.    LUNGS AND PLEURA: Moderate emphysematous changes in the lungs. Several irregular curvilinear opacities in the posterior aspects of the upper and mid lungs bilaterally are again noted and most likely represent scarring. A few patchy opacities are present   in the dependent aspects of bilateral lower lobes.    MEDIASTINUM/AXILLAE: A few nonspecific borderline enlarged mediastinal lymph nodes, not convincingly changed. For example, there is a 2.0 x 0.9 cm lower right paratracheal lymph node (series 4 image 41).    CORONARY ARTERY CALCIFICATION: Present.    MUSCULOSKELETAL: No acute findings.    UPPER ABDOMEN: Prior cholecystectomy.      Impression    IMPRESSION:   1. A few patchy opacities in the dependent aspects of bilateral lower lobes could represent atelectasis or pneumonia.  2. No visualized embolus in the main or lobar pulmonary arteries. Detection of segmental and subsegmental pulmonary emboli is limited on this study due to motion artifact.  3. Moderate emphysematous changes in the lungs.     Medications       allopurinol  300 mg Oral Daily     aspirin  325 mg Oral Daily     atorvastatin  10 mg Oral Daily     cyanocobalamin  500 mcg Sublingual Daily     enoxaparin ANTICOAGULANT  40 mg Subcutaneous Q24H     furosemide  40 mg Oral Daily     ipratropium-albuterol  1 puff Inhalation 4x Daily     metoprolol tartrate  12.5 mg Oral BID     PARoxetine  20 mg Oral Daily     piperacillin-tazobactam  3.375 g Intravenous Q6H     polyethylene glycol  17 g Oral Daily     sodium chloride (PF)  3 mL Intracatheter Q8H

## 2021-05-19 NOTE — PROGRESS NOTES
Pt was taken to and from CT on BIPAP on 60% with no complications.His sats was in the mid 90's.    Rochelle Dc, RT

## 2021-05-19 NOTE — ED PROVIDER NOTES
History     Chief Complaint:  Respiratory Distress     HPI   Ron Gilmore is a 80 year old male with history of COPD, hypertension, hyperlipidemia, CVA, COVID-19, who presents for evaluation of respiratory distress via EMS. Per EMS report and chart review, the patient was admitted to the hospital on May 1 after being diagnosed with COVID-19, experiencing shortness of breath, fevers, and a cough. He was found to have pneumonia and placed on Levaquin, which he finished a few days ago. The patient was ultimately discharged a few days ago on 1-2 L NC at baseline. However, 2 days ago this was increased to 3-4 L. The patient has had increasing fevers and decreasing oxygen with rales and wet breath sounds. He was noted to be 88% with 5 L NC and this improved to 95% on CPAP by EMS. The patient received fluids en route with a Duoneb but the patient denies symptoms of shortness of breath, cough, dizziness, or nausea. He states that he does not feel any different other than being hot.     Review of Systems   Constitutional: Positive for fever.   Respiratory: Negative for cough and shortness of breath.    Gastrointestinal: Negative for nausea.   Neurological: Negative for dizziness.   All other systems reviewed and are negative.    Allergies:  No Known Allergies    Medications:  allopurinol  aspirin   atorvastatin  cyanocobalamin   Emollient   furosemide  ipratropium-albuterol inhaler  metoprolol tartrate   Paxil    Past Medical History:    BPH   Cataract   Cholelithiasis   COPD   Depression   Diabetes mellitus   Dyshidrotic foot dermatitis   Edema   Gout   Hyperlipidemia   Hypertension   Kidney stones   Lumbago   Lumbar disc displacement without myelopathy   Muscle weakness   Neuropathy, diabetic    Obesity   Spinal stenosis   Stroke    Unsteady gait   Urinary retention with incomplete bladder emptying   UTI  Pneumonia  COVID-19  Acute respiratory failure with hypoxia   Severe sepsis   Metabolic encephalopathy    Past  Surgical History:    Appendectomy  Arthroscopy shoulder rotator cuff repair   Cataracts  choleystectomy  Colonoscopy  Cystoscopy   Cystoscopy, transurethral resection prostate  EP loop recorder implant   Right eye lid surgery  IR nephrostomy tube placement   IR ureteral stent placement   Right hip replacement   Knee surgery  Laminectomy, lumbar 1 level  Lithotripsy ureters, insert stent  Tonsillectomy    Family History:    Father: prostate cancer    Social History:  The patient was accompanied to the ED by EMS.  Smoking Status: Former     Physical Exam     Patient Vitals for the past 24 hrs:   BP Temp Temp src Pulse Resp SpO2 Weight   05/18/21 2300 104/68 -- -- 124 16 95 % --   05/18/21 2230 104/57 -- -- 122 15 94 % --   05/18/21 2145 109/51 -- -- 129 22 94 % --   05/18/21 2130 94/43 101.9  F (38.8  C) Axillary 133 23 94 % --   05/18/21 2115 136/58 -- -- 122 21 99 % --   05/18/21 2100 136/81 -- -- 125 22 100 % --   05/18/21 2045 127/79 -- -- 122 21 100 % --   05/18/21 2030 121/63 101.2  F (38.4  C) Rectal 125 20 98 % --   05/18/21 2015 113/52 -- -- 126 20 98 % --   05/18/21 2000 103/63 -- -- 128 20 97 % 110 kg (242 lb 8.1 oz)   05/18/21 1930 123/61 99.5  F (37.5  C) Axillary 130 25 95 % --     Physical Exam  General: on CPAP, chronically ill appearing  CV: tachycardic rate, regular rhythm  Resp: normal effort, rales in bilateral lung bases  GI: abdomen soft and nontender, no guarding  MSK: no bony tenderness, deconditioned.  Skin: appropriately warm and dry.  Reddened buttocks underneath dressing over the sacrum  Neuro: follows commands, moving all extremities equally.  Psych: fatigued, cooperative    Emergency Department Course     ECG:  ECG taken at 1931, ECG read at 1936  Wide QRS tachycardia with occasional premature ventricular complexes   Right bundle branch block   T wave abnormality, consider inferior ischemia   Abnorma ECG  No significant change compared to EKG dated 5/10/21  Rate 127 bpm. MD interval * ms.  QRS duration 120 ms. QT/QTc 326/473 ms. P-R-T axes * 31 9.    Imaging:    CT Chest Pulmonary Embolism w Contrast  1. A few patchy opacities in the dependent aspects of bilateral lower lobes could represent atelectasis or pneumonia.  2. No visualized embolus in the main or lobar pulmonary arteries. Detection of segmental and subsegmental pulmonary emboli is limited on this study due to motion artifact.  3. Moderate emphysematous changes in the lungs.  Reading per radiology    XR Chest Port 1 View  Similar interstitial change, previously seen infiltrates  appear improved.  BRISSA MENESES MD  Reading per radiology     Laboratory:    Symptomatic SARS-CoV2 (COVID-19) Virus PCR Multiplex: Positive (A)     UA: Protein Albumin 10 (A), Leukocyte Esterase large (A), RBC 3 (H), WBC 78 (H), WBC Clumps present (A), Yeast moderate (A), Mucous present (A), o/w WNL.   Urine Culture: Pending     CBC: WBC 8.5, HGB 10.5 (L),   BMP: Carbon Dioxide 34 (H), Glucose 147 (H), GFR 59 (L), o/w WNL (Creatinine 1.17)    Troponin (Collected 1938): <0.015    Nt probnp inpatient: 795    Procalcitonin: <0.05     ISTAT gases lactate mine POCT: pH: 7.44 (H), PCO2: 52 (H), PO2: 29, Bicarbonate: 35 (H), O2 Sat: 56, Lactic acid: 1.7     Blood Culture x2: Pending     Emergency Department Course:    Reviewed:    I reviewed the patient's nursing notes, vitals, past medical records, Care Everywhere.     Assessments:    1923 I performed an exam of the patient as documented above.     1948 Patient rechecked.  He is stable on the BiPAP.    2145 Patient rechecked.  Vitals stable.     2345 trialed off BiPAP, given that work of breathing is minimal.  Oxygen saturation was 91% on 10 L, therefore BiPAP was restarted.    Consults:     2330 I spoke with Dr. Puri of the hospitalist service from [facility] regarding patient's presentation, findings, and plan of care.     0015 I spoke with Dr. Puri of the hospitalist service from [facility]  regarding patient's presentation, findings, and plan of care.     Interventions:  2049  mL IV  2050 Tylenol 500 mg PO  2213 Zosyn 4.5 g IV    Disposition:  The patient was admitted to the hospital under the care of Dr. Puri.     Impression & Plan      CMS Diagnoses:       Covid-19  Ron Gilmore was evaluated during a global COVID-19 pandemic, which necessitated consideration that the patient might be at risk for infection with the SARS-CoV-2 virus that causes COVID-19.   Applicable protocols for evaluation were followed during the patient's care.   COVID-19 was considered as part of the patient's evaluation. The plan for testing is:  a test was obtained during this visit.    Medical Decision Making:  Ron Gilmore is a 80 year old male who has history of CVA with left sided weakness, COPD, chronic chirinos catheter with recent history of septic shock from E.Colli, recent COVID-19 infection, recent admission for respiratory failure, who presents to the emergency department today for evaluation of worsening hypoxia and respiratory distress. On exam, the patient appears comfortable. He is requiring support with BiPAP to maintain oxygen saturations in the mid 90's. He is febrile to 101.9F. history of complicated by BiPAP, but he denies any complaints. Work up in the ED is notable for negative troponin, normal BNP, this is improved to prior hospitalization. Procalcitonin is also negative. VBG demonstrates elevated PCO2 but no acidosis. He does appear to have a chronic anemia which is not worse compared to baseline. UA shows persistent inflammation, but is unclear if this is the cause of his persistent symptoms. COVID-19 testing was positive, although this is difficult to interpret in light of recent COVID-19 testing. Chest xray demonstrates improvement in interstitial infiltrates. CT of the chest was obtained given that there is not clear cause of the patient's fever, as well as persistent tachycardia and hypoxia.  This is negative for PE, but does show persistent patchy infiltrates in the lung bases. At this point, the patient remains dependant on BiPAP for respiratory support. The etiology of his respiratory failure is somewhat unclear. He does have fever, although source is also unclear. He was given a dose of Zosyn here in the ED. He will need to be admitted for continued BiPAP and continued work up respiratory work up and fever.     Critical Care time was 45 minutes for this patient excluding procedures.     Diagnosis:    ICD-10-CM    1. Acute and chronic respiratory failure with hypoxia (H)  J96.21 Blood culture     Blood culture     Urine Culture Aerobic Bacterial     TSH with free T4 reflex     TSH with free T4 reflex     CANCELED: Urine Culture Aerobic Bacterial     CANCELED: Urine Culture Aerobic Bacterial     CANCELED: TSH with free T4 reflex     Scribe Disclosure:  I, Orla Severson, am serving as a scribe at 7:31 PM on 5/18/2021 to document services personally performed by Corinna Powers MD based on my observations and the provider's statements to me.     [facility] EMERGENCY DEPT         Corinna Powers MD  05/19/21 0200

## 2021-05-19 NOTE — PROGRESS NOTES
Attempted to call ED for report at 0106. Was transferred back, no one answered the phone. Will call again.     Report received. 011

## 2021-05-19 NOTE — PROGRESS NOTES
CPAP/BiPAP Settings  IPAP:12  EPAP: 6  Rate: 14  FiO2: 60  Timed Inspiration (sec): 0.8    CPAP/BiPAP/AVAPS/AVAPS AE Alarms  High Pressure: 25  Low Pressure: 5  Low Pressure Delay: 20  High Rate (breaths/min): 45  Low Rate (breaths/min): 5  Alarm Volume Level: 10    CPAP/BiPAP/AVAPS/AVAPS AE Patient Assessment  Skin Assessment: intact  Barriers Applied: mepilex on.    Plan: Will continue to monitor the pt on BIPAP.  Rochelle Dc, RT

## 2021-05-19 NOTE — ED TRIAGE NOTES
AURELIA from [facility] for respiratory distress. Recently discharged for PNA, COVID. Per EMS pt sats 88% on 4L NC, fever of 100.9, rales lung sounds.    Hx. Of DM, HTN, COPD, Stroke    Pt arrives on a CPAP, has a chirinos

## 2021-05-19 NOTE — PROGRESS NOTES
Page from Interventional radiologist confirming DVT in right calf. Page to [place] hospitalist, to inform.

## 2021-05-19 NOTE — H&P
[facility]    History and Physical - Hospitalist Service       Date of Admission:  5/18/2021    Assessment & Plan   79 y/o male with complex past medical history of CVA with residual left-sided weakness, COPD, chronic urinary retention with chronic indwelling Cardona catheter, history of septic shock secondary to E. 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  Also history of dyslipidemia, depression, diabetes mellitus type 2 diet-controlled and dysphagia and recent COVID-19 infection and recent admission to [facility] from 05/10 to 05/17 with acute diastolic congestive heart failure, hypoxic respiratory failure, possible pneumonia.  He is re admitted on 5/18/2021 with fever and possible worsening of oxygenation.    #SIRS: Fever 101.9 and tachycardia 122 (normal WBC of 8.5, procalcitonin less than 0.05)   -Unclear source of fever : D/D being UTI with chronic indwelling catheter (has large LE but negative nitrite and recent completion of 5 days of Zosyn), pneumonia versus atelectasis (normal procalcitonin) , sacral wound /pressure injury (could not be examined in  ED)  -Follow-up panculture  -Continue empiric Zosyn for now    #Concern for acute on chronic hypoxemic respiratory failure: (He was discharged on 2 L nasal cannula but EMS found him to be 88% with 5 L so was placed on CPAP)  # History of chronic obstructive lung disease   # Sleep disordered breathing, non compliant with continuous positive airway pressure   # Recent COVID-19 infection 5/1/2021  -His VBG on presentation shows pH of 7.44, CO2 52, PO2 29 And serum bicarb of 34.(It appears adequately compensated along with elevated bicarb which could be from contraction alkalosis considering recent aggressive diuresis)   -Will monitor from on nasal cannula to titrate sats greater  than 93%  -As needed BiPAP if required  - ct special precautions for now as no clear source of infection (may need ID review in the morning)    #Chronic urinary catheter  -Continue catheter cares    #Hypertension  #Intermittent atrial fibrillation-not on anticoagulation  #Chronic diastolic congestive heart failure: Appears stable at present with normal BNP and no clinical evidence of fluid overload      #Type 2 diabetes mellitus  -Last HbA1c of 6.0 on 05/11.  Continue dietary control and monitor    #Depression  - ct Paroxetine     #Stroke in 2016 2020  - Ct ASA , statin   - review in am for need for chronic anticoagulation     # Dysphagia (? Chronic) as per previous discharge summary  - SLP review.   - Combination Diet Dysphagia Diet Level 2: Mechan Altered; Nectar Thickened Liquids (pre-thickened or use instant food thickener) (by spoon)      Diet:   Combination Diet Dysphagia Diet Level 2: Mechan Altered; Nectar Thickened Liquids (pre-thickened or use instant food thickener) (by spoon)  DVT Prophylaxis: Enoxaparin (Lovenox) SQ  Cardona Catheter: in place, indication:    Code Status:   Full         Disposition Plan   Expected discharge: 2 - 3 days, recommended to prior living arrangement once antibiotic plan established.  Entered: Low Puri MD, MD 05/19/2021, 12:28 AM     The patient's care was discussed with the Bedside Nurse, Patient and ED Team.    Low Puri MD, MD  [facility]  Contact information available via [facility] Paging/Directory      ______________________________________________________________________    Chief Complaint   Fever, ? Shortness of breath     History is obtained from the patient  D/W ED MD  Review of EMR     History of Present Illness   Ron Gilmore is a 81 y/o male with complex past medical history of CVA with residual left-sided weakness, COPD, chronic urinary retention with chronic indwelling Cardona catheter, history of septic shock secondary to E. coli  bacteremia due to an obstructive lumbosacral UV junction stone with hydronephrosis status post right-sided percutaneous tube placement and removal and right-sided stent placement.  Also history of dyslipidemia, depression, diabetes mellitus type 2 diet-controlled and dysphagia and recent COVID-19 infection and recent admission to [facility] from 05/10 to 05/17 with acute diastolic congestive heart failure, hypoxic respiratory failure, possible pneumonia.  He is re admitted on 5/18/2021 with fever and possible worsening of oxygenation.    As per him was discharged a day before and he noticed to have fever so called EMS  Denies Chest pain/ SOB/ cough (although as per EMS decreased sats to 88% and was placed on CPAP), Denies any N&V/ bowel problems  Denies urinary problems , Denies fever/chills/rigors     Review of Systems    The 10 point Review of Systems is negative other than noted in the HPI or here.     Past Medical History    I have reviewed this patient's medical history and updated it with pertinent information if needed.   Past Medical History:   Diagnosis Date     BPH (benign prostatic hyperplasia)      Cataract      Cholelithiasis      COPD (chronic obstructive pulmonary disease) (H)      Depression      Diabetes mellitus     Type 2     Dyshidrotic foot dermatitis      Edema      Gout      Hyperlipidemia      Hypertension      Kidney stones     Bladder Stones     Lumbago      Lumbar disc displacement without myelopathy      Muscle weakness      Neuropathy, diabetic (H)      Obesity      Spinal stenosis      Stroke (H)     with residual effects- weakness LUE> LLE     Unsteady gait      Urinary retention with incomplete bladder emptying      UTI (urinary tract infection)        Past Surgical History   I have reviewed this patient's surgical history and updated it with pertinent information if needed.  Past Surgical History:   Procedure Laterality Date     APPENDECTOMY OPEN       ARTHROSCOPY  SHOULDER ROTATOR CUFF REPAIR       cataracts Bilateral      CHOLECYSTECTOMY       COLONOSCOPY       CYSTOSCOPY  10/19/2011    Procedure:CYSTOSCOPY; CYSTOSCOPY, BLADDER STONE REMOVAL; Surgeon:ROB SAWYER; Location: OR     CYSTOSCOPY, TRANSURETHRAL RESECTION (TUR) PROSTATE, COMBINED N/A 2/21/2018    Procedure: COMBINED CYSTOSCOPY, TRANSURETHRAL RESECTION (TUR) PROSTATE;  COMBINED CYSTOSCOPY, TRANSURETHRAL RESECTION (TUR) PROSTATE ;  Surgeon: Rob Sawyer MD;  Location:  OR     EP LOOP RECORDER IMPLANT N/A 1/20/2020    Procedure: EP Loop Recorder Implant;  Surgeon: Evgeny Parisi MD;  Location:  HEART CARDIAC CATH LAB     EYE SURGERY      right lid surgery      IR NEPHROSTOMY TUBE PLACEMENT RIGHT  3/9/2021     IR URETERAL STENT PLACEMENT RIGHT  3/16/2021     JOINT REPLACEMENT Right     HIP     KNEE SURGERY Bilateral      LAMINECTOMY LUMBAR ONE LEVEL       LASER HOLMIUM LITHOTRIPSY URETER(S), INSERT STENT, COMBINED Right 4/14/2021    Procedure: CYSTOSCOPY, BLADDER STONE REMOVAL, RIGHT URETEROSCOPY, HOLMIUM LASER LITHOTRIPSY, AND RIGHT STENT REMOVAL, RIGHT RETROGRADE;  Surgeon: Rob Sawyer MD;  Location:  OR     TONSILLECTOMY         Social History   I have reviewed this patient's social history and updated it with pertinent information if needed.  Social History     Tobacco Use     Smoking status: Former Smoker     Smokeless tobacco: Never Used   Substance Use Topics     Alcohol use: No     Comment: none for 29 yrs     Drug use: No       Family History   I have reviewed this patient's family history and updated it with pertinent information if needed.  Family History   Problem Relation Age of Onset     Prostate Cancer Father        Prior to Admission Medications   Prior to Admission Medications   Prescriptions Last Dose Informant Patient Reported? Taking?   Emollient (AMLACTIN ULTRA EX) prn Nursing Home Yes No   Sig: Apply topically daily as needed TO FEET   PARoxetine (PAXIL) 20 MG tablet  5/18/2021 at am Nursing Home Yes Yes   Sig: Take 20 mg by mouth daily   acetaminophen (TYLENOL) 325 MG tablet 5/10/2021 at 1632 Nursing Home Yes No   Sig: Take 650 mg by mouth every 4 hours as needed for mild pain as needed for pain/fever Max dose 3000mg/24hr   allopurinol (ZYLOPRIM) 300 MG tablet 5/18/2021 at am Nursing Home Yes Yes   Sig: Take 300 mg by mouth daily   aspirin (ASA) 325 MG EC tablet 5/18/2021 at am Nursing Home No Yes   Sig: Take 1 tablet (325 mg) by mouth daily   atorvastatin (LIPITOR) 10 MG tablet 5/18/2021 at am Nursing Home Yes Yes   Sig: Take 10 mg by mouth daily   cyanocobalamin (VITAMIN B-12) 500 MCG SUBL sublingual tablet 5/18/2021 at am Nursing Home No Yes   Sig: Place 1 tablet (500 mcg) under the tongue daily   furosemide (LASIX) 40 MG tablet 5/18/2021 at am Nursing Home No Yes   Sig: Take 1 tablet (40 mg) by mouth daily   ipratropium-albuterol (COMBIVENT RESPIMAT)  MCG/ACT inhaler 5/18/2021 at 1600 Nursing Home Yes Yes   Sig: Inhale 1 puff into the lungs 4 times daily 0800, 1200 ,1600 ,2000   metoprolol tartrate (LOPRESSOR) 25 MG tablet 5/18/2021 at am Nursing Home No Yes   Sig: Take 0.5 tablets (12.5 mg) by mouth 2 times daily   polyethylene glycol (MIRALAX) 17 GM/Dose powder 5/18/2021 at am Nursing Home No Yes   Sig: Take 17 g by mouth daily      Facility-Administered Medications: None     Allergies   No Known Allergies    Physical Exam   /66   Pulse 122   Temp 99.7  F (37.6  C) (Rectal)   Resp 10   Wt 110 kg (242 lb 8.1 oz)   SpO2 94%   BMI 32.89 kg/m    Gen- pleasant lying in bed  HEENT- NAD, SILVINO,MMM  Neck- supple, no JVD elevation, no thyromegaly  CVS- I+II+ no m/r/g, tachy  RS- CTAB, decreased at base   Abdo- soft, no tenderness . No g/r/r  Ext- no edema   CNS- oreinted to person/ place and time    Data   Data reviewed today: I reviewed all medications, new labs and imaging results over the last 24 hours. I personally reviewed the chest CT image(s) showing as  below.  BMP  Recent Labs   Lab 05/18/21 1938 05/17/21  0657 05/15/21  0703 05/14/21  0710 05/13/21  0814     --  145* 144 143   POTASSIUM 3.4  --  4.0 3.3* 3.4   CHLORIDE 105  --  113* 113* 111*   RAJ 8.8  --  8.1* 8.1* 8.1*   CO2 34*  --  28 27 27   BUN 24  --  19 23 26   CR 1.17 1.07 1.05 1.26* 1.32*   *  --  88 98 87     CBC  Recent Labs   Lab 05/18/21 1938 05/17/21  0657 05/15/21  1155 05/14/21  0710 05/12/21  0740   WBC 8.5  --  7.2  --  6.5   RBC 4.00*  --  3.59*  --  3.85*   HGB 10.5* 9.6* 9.4*  --  10.0*   HCT 35.3*  --  31.9*  --  34.2*   MCV 88  --  89  --  89   MCH 26.3*  --  26.2*  --  26.0*   MCHC 29.7*  --  29.5*  --  29.2*   RDW 16.9*  --  16.7*  --  16.6*    251 198 176 161     Inflammatory Markers    Recent Labs   Lab Test 05/15/21  1155 05/04/21  0834 05/03/21  0810 05/02/21  1154 05/01/21  1558   CRP 83.6* 20.3* 41.0* 82.9* 83.2*     Procal <0.05    Results for SHERI THORPE (MRN [id number]) as of 5/19/2021 00:35   Ref. Range 10/12/2018 20:32 5/1/2021 15:58 5/15/2021 11:55 5/18/2021 19:38   N-Terminal Pro BNP Inpatient Latest Ref Range: 0 - 1,800 pg/mL 601 664 4,482 (H) 795       Urine Studies    Recent Labs   Lab Test 05/18/21  2137 05/11/21  0117 05/01/21  1715 03/09/21  0416 02/08/21  1553   URINEPH 6.5 6.0 5.5 5.5 7.5*   NITRITE Negative Negative Negative Negative Positive*   LEUKEST Large* Large* Large* Large* Large*   WBCU 78* 124* 62* >182* >182*       Recent Results (from the past 24 hour(s))   XR Chest Port 1 View    Narrative    CHEST ONE VIEW  5/18/2021 7:53 PM     HISTORY: Respiratory distress.    COMPARISON: May 15, 2021      Impression    IMPRESSION: Similar interstitial change, previously seen infiltrates  appear improved.    BRISSA MENESES MD   CT Chest Pulmonary Embolism w Contrast    Narrative    EXAM: CT CHEST WITH CONTRAST - PULMONARY EMBOLISM PROTOCOL   LOCATION: [facility]  DATE/TIME: 5/18/2021 10:01 PM    INDICATION: Respiratory distress.  Positive D-dimer.  COMPARISON: 05/11/2021 - CT chest, abdomen and pelvis.    TECHNIQUE: CT angiogram chest during pulmonary arterial phase injection IV contrast. Dose reduction techniques were used.   CONTRAST: 79mL [id number]    FINDINGS:    ANGIOGRAM CHEST: Note the detection of segmental and subsegmental pulmonary emboli is limited due to motion artifact. No visualized embolus within the main or lobar pulmonary arteries. The thoracic aorta is normal in caliber. Atherosclerotic   calcification in the thoracic aorta.    LUNGS AND PLEURA: Moderate emphysematous changes in the lungs. Several irregular curvilinear opacities in the posterior aspects of the upper and mid lungs bilaterally are again noted and most likely represent scarring. A few patchy opacities are present   in the dependent aspects of bilateral lower lobes.    MEDIASTINUM/AXILLAE: A few nonspecific borderline enlarged mediastinal lymph nodes, not convincingly changed. For example, there is a 2.0 x 0.9 cm lower right paratracheal lymph node (series 4 image 41).    CORONARY ARTERY CALCIFICATION: Present.    MUSCULOSKELETAL: No acute findings.    UPPER ABDOMEN: Prior cholecystectomy.      Impression    IMPRESSION:   1. A few patchy opacities in the dependent aspects of bilateral lower lobes could represent atelectasis or pneumonia.  2. No visualized embolus in the main or lobar pulmonary arteries. Detection of segmental and subsegmental pulmonary emboli is limited on this study due to motion artifact.  3. Moderate emphysematous changes in the lungs.

## 2021-05-19 NOTE — PHARMACY-ADMISSION MEDICATION HISTORY
"Admission medication history interview status for the 5/18/2021  admission is complete. See EPIC admission navigator for prior to admission medications     Medication history source reliability:Good    Actions taken by pharmacist (provider contacted, etc):CHEYENNE from [facility]     Additional medication history information not noted on PTA med list :None    Medication reconciliation/reorder completed by provider prior to medication history? No    Time spent in this activity: 30\"    Prior to Admission medications    Medication Sig Last Dose Taking? Auth Provider   allopurinol (ZYLOPRIM) 300 MG tablet Take 300 mg by mouth daily 5/18/2021 at am Yes Unknown, Entered By History   aspirin (ASA) 325 MG EC tablet Take 1 tablet (325 mg) by mouth daily 5/18/2021 at am Yes Sandro Maradiaga MD   atorvastatin (LIPITOR) 10 MG tablet Take 10 mg by mouth daily 5/18/2021 at am Yes Unknown, Entered By History   cyanocobalamin (VITAMIN B-12) 500 MCG SUBL sublingual tablet Place 1 tablet (500 mcg) under the tongue daily 5/18/2021 at am Yes Barron Duque MD   furosemide (LASIX) 40 MG tablet Take 1 tablet (40 mg) by mouth daily 5/18/2021 at am Yes Barron Duque MD   ipratropium-albuterol (COMBIVENT RESPIMAT)  MCG/ACT inhaler Inhale 1 puff into the lungs 4 times daily 0800, 1200 ,1600 ,2000 5/18/2021 at 1600 Yes Reported, Patient   metoprolol tartrate (LOPRESSOR) 25 MG tablet Take 0.5 tablets (12.5 mg) by mouth 2 times daily 5/18/2021 at am Yes Romulo Cavanaugh MD   PARoxetine (PAXIL) 20 MG tablet Take 20 mg by mouth daily 5/18/2021 at am Yes Unknown, Entered By History   polyethylene glycol (MIRALAX) 17 GM/Dose powder Take 17 g by mouth daily 5/18/2021 at am Yes Romulo Cavanaugh MD   acetaminophen (TYLENOL) 325 MG tablet Take 650 mg by mouth every 4 hours as needed for mild pain as needed for pain/fever Max dose 3000mg/24hr 5/10/2021 at 1632  Reported, Patient   Emollient (AMLACTIN ULTRA EX) Apply " topically daily as needed TO FEET prn  Reported, Patient

## 2021-05-19 NOTE — PLAN OF CARE
Pt is A+Ox4, VSS on continuous BiPAP. Tolerating well. Diminished lung sounds. Feet are peeling on soles. Non-blanchable redness, red purple in color, skin intact - on coccyx. Mepilix found. Unable to move left upper and lower extremity due to history of CVA. NPO due to BiPAP. Turned and repositioned. Pulsate mattress ordered.

## 2021-05-19 NOTE — PROGRESS NOTES
"CLINICAL SWALLOW EVALUATION       05/19/21 1200   General Information   Onset of Illness/Injury or Date of Surgery 05/18/21   Referring Physician Dr. Duque   Patient/Family Therapy Goal Statement (SLP) To return home, continue oral diet.    Pertinent History of Current Problem \"Ron Gilmore is a 80 year old male with history of septic shock secondary to urosepsis in March 2021, paroxysmal atrial fibrillation, COPD, and CVA who presents with shortness of breath. Per chart review, the patient was admitted to the hospital from 5/1-5/4 due to shortness of breath and vomiting and tested positive for Covid-19 on 5/1. He was treated with dexamethasone and remdesevir. In addition, he was noted to have a UTI and was started on ciprofloxacin upon discharge. Today, a Nurse Practicioner at the [facility] stated that he was \"declining rapidly,\" experiencing increased shortness of breath, cough, and pharyngitis during admission earlier this month.  He was readmitted with fever and hypoxia.  He reports his diet had been advanced to dysphagia diet level 3 yesterday at TCU.  He denies noticing difficulty with eating and swallowing after diet was upgraded.    General Observations Patient is alert, pleasant, and cooperative.  He is requiring 35L HFNC at this time.    Past History of Dysphagia Chronic dysphagia following right CVA.    Type of Evaluation   Type of Evaluation Swallow Evaluation   Oral Motor   Oral Musculature generally intact   Mucosal Quality dry   Dentition (Oral Motor)   Dentition (Oral Motor) dental appliance/dentures;other (see comments)   Facial Symmetry (Oral Motor)   Facial Symmetry (Oral Motor) WNL   Lip Function (Oral Motor)   Lip Range of Motion (Oral Motor) WNL   Tongue Function (Oral Motor)   Tongue ROM (Oral Motor) WNL   Vocal Quality/Secretion Management (Oral Motor)   Vocal Quality (Oral Motor) WNL   Secretion Management (Oral Motor) WNL   General Swallowing Observations   Current " Diet/Method of Nutritional Intake (General Swallowing Observations, NIS) nectar-thick liquids;dysphagia level 2 (mechanically altered)   Respiratory Support (General Swallowing Observations) nasal cannula;other (see comments)  (HFNC 35 L)   Swallowing Evaluation Clinical swallow evaluation   Clinical Swallow Evaluation   Feeding Assistance set up only required   Additional evaluation(s) completed today No   Clinical Swallow Eval: Thin Liquid Texture Trial   Mode of Presentation, Thin Liquids spoon;fed by clinician   Volume of Liquid or Food Presented 1 tsp single ice chips   Oral Phase of Swallow WFL   Pharyngeal Phase of Swallow impaired   Diagnostic Statement Patient reported to cough with single ice chip trials.    Clinical Swallow Evaluation: Nectar-Thick Liquid Texture Trial   Mode of Presentation, Nectar spoon;cup;self-fed;fed by clinician   Volume of Nectar Presented 5 oz   Oral Phase, [place] WFL   Pharyngeal Phase, [place] intact   Diagnostic Statement No overt Sx of aspiration   Clinical Swallow Evaluation: Puree Solid Texture Trial   Mode of Presentation, Puree spoon;self-fed;fed by clinician   Volume of Puree Presented 3 oz   Oral Phase, Puree WFL   Pharyngeal Phase, Puree intact   Diagnostic Statement No overt Sx of aspiration   Clinical Swallow Evaluation: Semisolid Texture Trial   Mode of Presentation, Semisolid spoon;self-fed   Volume of Semisolid Food Presented 1 tsp   Oral Phase, Semisolid WFL   Pharyngeal Phase, Semisolid intact   Diagnostic Statement Slow and careful oral prep phase, no overt Sx of aspiration   Swallowing Recommendations   Diet Consistency Recommendations nectar-thick liquids;dysphagia level 2 (mechanically altered)   Supervision Level for Intake distant supervision needed   Mode of Delivery Recommendations bolus size, small;no straws   Swallowing Maneuver Recommendations alternate food and liquid intake;double dry swallow   Monitoring/Assistance Required (Eating/Swallowing) stop  eating activities when fatigue is present;monitor for cough or change in vocal quality with intake   Recommended Feeding/Eating Techniques (Swallow Eval) maintain upright sitting position for eating;maintain upright posture during/after eating for 30 minutes;provide assist with feeding;provide oral hygiene prior to intake   Medication Administration Recommendations, Swallowing (SLP) As preferred with puree or nectar thick liquids   General Therapy Interventions   Planned Therapy Interventions Dysphagia Treatment   Dysphagia treatment Modified diet education;Instruction of safe swallow strategies   SLP Therapy Assessment/Plan   Criteria for Skilled Therapeutic Interventions Met (SLP Eval) yes   SLP Diagnosis Mild to moderate oropharyngeal dysphagia   Rehab Potential (SLP Eval) good, to achieve stated therapy goals   Therapy Frequency (SLP Eval) 5 times/wk   Predicted Duration of Therapy Intervention (SLP Eval) 1 week   Therapy Plan Review/Discharge Plan (SLP)   Therapy Plan Review (SLP) evaluation/treatment results reviewed;care plan/treatment goals reviewed;risks/benefits reviewed;current/potential barriers reviewed;participants voiced agreement with care plan;participants included;patient;caregiver   SLP Discharge Planning    SLP Discharge Recommendation (DC Rec) home with home care speech therapy   SLP Rationale for DC Rec Recommend SLP follow up for safe return to least restrictive oral intake, patient wishes to discharge home   SLP Brief overview of current status  Clinical swallow evaluation completed and treatment initiated: recommend continue dysphagia diet level 2 with nectar thick liquids. by cup or spoon are okay when patient is fully alert and upright, taking small sips and breathing breaks.     Total Evaluation Time   Total Evaluation Time (Minutes) 30

## 2021-05-19 NOTE — ED NOTES
"Pt taken off bipap per Hospitalist request. Started on 5L NC and O2 sat only 88%. Steadily increased O2 up to 10L via oxymask. O2 sat only up to 91%. Pt asks \"what happened? I can't breath again.\" Pt placed back onto bipap.  "

## 2021-05-20 LAB
ANION GAP SERPL CALCULATED.3IONS-SCNC: 3 MMOL/L (ref 3–14)
BASE EXCESS BLDV CALC-SCNC: 12.6 MMOL/L
BUN SERPL-MCNC: 28 MG/DL (ref 7–30)
CALCIUM SERPL-MCNC: 8.5 MG/DL (ref 8.5–10.1)
CHLORIDE SERPL-SCNC: 104 MMOL/L (ref 94–109)
CO2 SERPL-SCNC: 35 MMOL/L (ref 20–32)
CREAT SERPL-MCNC: 1.17 MG/DL (ref 0.66–1.25)
ERYTHROCYTE [DISTWIDTH] IN BLOOD BY AUTOMATED COUNT: 17.2 % (ref 10–15)
GFR SERPL CREATININE-BSD FRML MDRD: 59 ML/MIN/{1.73_M2}
GLUCOSE SERPL-MCNC: 107 MG/DL (ref 70–99)
HCO3 BLDV-SCNC: 39 MMOL/L (ref 21–28)
HCT VFR BLD AUTO: 29.6 % (ref 40–53)
HGB BLD-MCNC: 8.7 G/DL (ref 13.3–17.7)
MCH RBC QN AUTO: 26.3 PG (ref 26.5–33)
MCHC RBC AUTO-ENTMCNC: 29.4 G/DL (ref 31.5–36.5)
MCV RBC AUTO: 89 FL (ref 78–100)
O2/TOTAL GAS SETTING VFR VENT: 0 %
OXYHGB MFR BLDV: 69 %
PCO2 BLDV: 59 MM HG (ref 40–50)
PH BLDV: 7.43 PH (ref 7.32–7.43)
PLATELET # BLD AUTO: 190 10E9/L (ref 150–450)
PO2 BLDV: 40 MM HG (ref 25–47)
POTASSIUM SERPL-SCNC: 3.7 MMOL/L (ref 3.4–5.3)
RBC # BLD AUTO: 3.31 10E12/L (ref 4.4–5.9)
SODIUM SERPL-SCNC: 142 MMOL/L (ref 133–144)
WBC # BLD AUTO: 8 10E9/L (ref 4–11)

## 2021-05-20 PROCEDURE — 250N000011 HC RX IP 250 OP 636: Performed by: INTERNAL MEDICINE

## 2021-05-20 PROCEDURE — 85027 COMPLETE CBC AUTOMATED: CPT | Performed by: INTERNAL MEDICINE

## 2021-05-20 PROCEDURE — 82805 BLOOD GASES W/O2 SATURATION: CPT | Performed by: INTERNAL MEDICINE

## 2021-05-20 PROCEDURE — 120N000013 HC R&B IMCU

## 2021-05-20 PROCEDURE — 80048 BASIC METABOLIC PNL TOTAL CA: CPT | Performed by: INTERNAL MEDICINE

## 2021-05-20 PROCEDURE — 36415 COLL VENOUS BLD VENIPUNCTURE: CPT | Performed by: INTERNAL MEDICINE

## 2021-05-20 PROCEDURE — 99233 SBSQ HOSP IP/OBS HIGH 50: CPT | Performed by: HOSPITALIST

## 2021-05-20 PROCEDURE — 250N000013 HC RX MED GY IP 250 OP 250 PS 637: Performed by: INTERNAL MEDICINE

## 2021-05-20 PROCEDURE — 250N000011 HC RX IP 250 OP 636: Performed by: HOSPITALIST

## 2021-05-20 PROCEDURE — 250N000013 HC RX MED GY IP 250 OP 250 PS 637: Performed by: HOSPITALIST

## 2021-05-20 RX ORDER — PIPERACILLIN SODIUM, TAZOBACTAM SODIUM 4; .5 G/20ML; G/20ML
4.5 INJECTION, POWDER, LYOPHILIZED, FOR SOLUTION INTRAVENOUS EVERY 6 HOURS
Status: DISCONTINUED | OUTPATIENT
Start: 2021-05-20 | End: 2021-05-28

## 2021-05-20 RX ADMIN — IPRATROPIUM BROMIDE AND ALBUTEROL 1 PUFF: 20; 100 SPRAY, METERED RESPIRATORY (INHALATION) at 21:52

## 2021-05-20 RX ADMIN — ENOXAPARIN SODIUM 105 MG: 120 INJECTION SUBCUTANEOUS at 05:00

## 2021-05-20 RX ADMIN — PAROXETINE HYDROCHLORIDE 20 MG: 20 TABLET, FILM COATED ORAL at 08:36

## 2021-05-20 RX ADMIN — ASPIRIN 81 MG: 81 TABLET, COATED ORAL at 08:36

## 2021-05-20 RX ADMIN — Medication 500 MCG: at 08:37

## 2021-05-20 RX ADMIN — PIPERACILLIN SODIUM AND TAZOBACTAM SODIUM 4.5 G: 4; .5 INJECTION, POWDER, LYOPHILIZED, FOR SOLUTION INTRAVENOUS at 23:45

## 2021-05-20 RX ADMIN — METOPROLOL TARTRATE 12.5 MG: 25 TABLET, FILM COATED ORAL at 21:51

## 2021-05-20 RX ADMIN — IPRATROPIUM BROMIDE AND ALBUTEROL 1 PUFF: 20; 100 SPRAY, METERED RESPIRATORY (INHALATION) at 08:38

## 2021-05-20 RX ADMIN — PIPERACILLIN SODIUM AND TAZOBACTAM SODIUM 3.38 G: 3; .375 INJECTION, POWDER, LYOPHILIZED, FOR SOLUTION INTRAVENOUS at 05:00

## 2021-05-20 RX ADMIN — ATORVASTATIN CALCIUM 10 MG: 10 TABLET, FILM COATED ORAL at 08:37

## 2021-05-20 RX ADMIN — FUROSEMIDE 40 MG: 40 TABLET ORAL at 08:36

## 2021-05-20 RX ADMIN — IPRATROPIUM BROMIDE AND ALBUTEROL 1 PUFF: 20; 100 SPRAY, METERED RESPIRATORY (INHALATION) at 18:07

## 2021-05-20 RX ADMIN — IPRATROPIUM BROMIDE AND ALBUTEROL 1 PUFF: 20; 100 SPRAY, METERED RESPIRATORY (INHALATION) at 12:45

## 2021-05-20 RX ADMIN — IPRATROPIUM BROMIDE AND ALBUTEROL 1 PUFF: 20; 100 SPRAY, METERED RESPIRATORY (INHALATION) at 00:09

## 2021-05-20 RX ADMIN — ALLOPURINOL 300 MG: 300 TABLET ORAL at 08:37

## 2021-05-20 RX ADMIN — PIPERACILLIN SODIUM AND TAZOBACTAM SODIUM 4.5 G: 4; .5 INJECTION, POWDER, LYOPHILIZED, FOR SOLUTION INTRAVENOUS at 16:42

## 2021-05-20 RX ADMIN — PIPERACILLIN SODIUM AND TAZOBACTAM SODIUM 4.5 G: 4; .5 INJECTION, POWDER, LYOPHILIZED, FOR SOLUTION INTRAVENOUS at 10:41

## 2021-05-20 RX ADMIN — ENOXAPARIN SODIUM 105 MG: 120 INJECTION SUBCUTANEOUS at 16:42

## 2021-05-20 ASSESSMENT — ACTIVITIES OF DAILY LIVING (ADL)
ADLS_ACUITY_SCORE: 24
ADLS_ACUITY_SCORE: 23
ADLS_ACUITY_SCORE: 25
ADLS_ACUITY_SCORE: 24
ADLS_ACUITY_SCORE: 24
ADLS_ACUITY_SCORE: 25

## 2021-05-20 NOTE — PLAN OF CARE
Pt is A+Ox4, VSS on high flow oxygen. Currently 60% FiO2, 40 LPM. Lung sounds diminished. Coccyx has suspected pressure injury. Mepilix on. Left sided deficits from previous CVA, severely impaired. Turned and repositioned every two hours. Nectar thickened liquids provided. Special precautions maintained for COVID 19. Lung sounds diminished.

## 2021-05-20 NOTE — PROGRESS NOTES
Patient remains on HFNC 35 LPM 60%. Skin assessed no issues. Mepilex and Optifoam in place.       RT will continue to follow and monitor.  Shawnee Luu, RT

## 2021-05-20 NOTE — PLAN OF CARE
IMC status. Patient AOX4, slightly forgetful. Denies pain. On high low nasal cannula 60% FiO2, 35LPM. LS diminished. Soft blood pressures, AM metoprolol held. BM+, incontinent. Voiding adequately with chronic chirinos. Poor appetite, only wants apple juice, apple sauce and boost shakes. Repo'd Q2, on pulsate mattress. Mepilex replaced on coccyx. Tele- NSR, tachycardiac at times.

## 2021-05-20 NOTE — PROGRESS NOTES
Patient remains on high flow nasal cannula at 36% and       20 LPM. Skin assessed and issues found. Mepilex/opti foam remains in place. Breath sounds diminished in the bases.

## 2021-05-21 ENCOUNTER — APPOINTMENT (OUTPATIENT)
Dept: SPEECH THERAPY | Facility: CLINIC | Age: 81
DRG: 871 | End: 2021-05-21
Payer: COMMERCIAL

## 2021-05-21 LAB
ANION GAP SERPL CALCULATED.3IONS-SCNC: 2 MMOL/L (ref 3–14)
BACTERIA SPEC CULT: ABNORMAL
BUN SERPL-MCNC: 36 MG/DL (ref 7–30)
CALCIUM SERPL-MCNC: 8.9 MG/DL (ref 8.5–10.1)
CHLORIDE SERPL-SCNC: 102 MMOL/L (ref 94–109)
CO2 SERPL-SCNC: 35 MMOL/L (ref 20–32)
CREAT SERPL-MCNC: 1.18 MG/DL (ref 0.66–1.25)
ERYTHROCYTE [DISTWIDTH] IN BLOOD BY AUTOMATED COUNT: 17.7 % (ref 10–15)
GFR SERPL CREATININE-BSD FRML MDRD: 58 ML/MIN/{1.73_M2}
GLUCOSE BLDC GLUCOMTR-MCNC: 123 MG/DL (ref 70–99)
GLUCOSE SERPL-MCNC: 114 MG/DL (ref 70–99)
HCT VFR BLD AUTO: 29.6 % (ref 40–53)
HGB BLD-MCNC: 8.5 G/DL (ref 13.3–17.7)
Lab: ABNORMAL
MCH RBC QN AUTO: 25.8 PG (ref 26.5–33)
MCHC RBC AUTO-ENTMCNC: 28.7 G/DL (ref 31.5–36.5)
MCV RBC AUTO: 90 FL (ref 78–100)
PLATELET # BLD AUTO: 173 10E9/L (ref 150–450)
POTASSIUM SERPL-SCNC: 5.2 MMOL/L (ref 3.4–5.3)
RBC # BLD AUTO: 3.29 10E12/L (ref 4.4–5.9)
SODIUM SERPL-SCNC: 139 MMOL/L (ref 133–144)
SPECIMEN SOURCE: ABNORMAL
WBC # BLD AUTO: 6.9 10E9/L (ref 4–11)

## 2021-05-21 PROCEDURE — 92526 ORAL FUNCTION THERAPY: CPT | Mod: GN | Performed by: SPEECH-LANGUAGE PATHOLOGIST

## 2021-05-21 PROCEDURE — 85027 COMPLETE CBC AUTOMATED: CPT | Performed by: HOSPITALIST

## 2021-05-21 PROCEDURE — 99232 SBSQ HOSP IP/OBS MODERATE 35: CPT | Performed by: HOSPITALIST

## 2021-05-21 PROCEDURE — 36415 COLL VENOUS BLD VENIPUNCTURE: CPT | Performed by: HOSPITALIST

## 2021-05-21 PROCEDURE — 250N000013 HC RX MED GY IP 250 OP 250 PS 637: Performed by: HOSPITALIST

## 2021-05-21 PROCEDURE — 80048 BASIC METABOLIC PNL TOTAL CA: CPT | Performed by: HOSPITALIST

## 2021-05-21 PROCEDURE — 250N000013 HC RX MED GY IP 250 OP 250 PS 637: Performed by: INTERNAL MEDICINE

## 2021-05-21 PROCEDURE — 120N000013 HC R&B IMCU

## 2021-05-21 PROCEDURE — 250N000011 HC RX IP 250 OP 636: Performed by: INTERNAL MEDICINE

## 2021-05-21 PROCEDURE — 250N000011 HC RX IP 250 OP 636: Performed by: HOSPITALIST

## 2021-05-21 PROCEDURE — 999N001017 HC STATISTIC GLUCOSE BY METER IP

## 2021-05-21 RX ADMIN — IPRATROPIUM BROMIDE AND ALBUTEROL 1 PUFF: 20; 100 SPRAY, METERED RESPIRATORY (INHALATION) at 18:16

## 2021-05-21 RX ADMIN — PIPERACILLIN SODIUM AND TAZOBACTAM SODIUM 4.5 G: 4; .5 INJECTION, POWDER, LYOPHILIZED, FOR SOLUTION INTRAVENOUS at 10:50

## 2021-05-21 RX ADMIN — Medication 500 MCG: at 09:00

## 2021-05-21 RX ADMIN — ATORVASTATIN CALCIUM 10 MG: 10 TABLET, FILM COATED ORAL at 09:00

## 2021-05-21 RX ADMIN — PIPERACILLIN SODIUM AND TAZOBACTAM SODIUM 4.5 G: 4; .5 INJECTION, POWDER, LYOPHILIZED, FOR SOLUTION INTRAVENOUS at 23:16

## 2021-05-21 RX ADMIN — ASPIRIN 81 MG: 81 TABLET, COATED ORAL at 08:59

## 2021-05-21 RX ADMIN — ALLOPURINOL 300 MG: 300 TABLET ORAL at 09:00

## 2021-05-21 RX ADMIN — ENOXAPARIN SODIUM 105 MG: 120 INJECTION SUBCUTANEOUS at 18:12

## 2021-05-21 RX ADMIN — METOPROLOL TARTRATE 12.5 MG: 25 TABLET, FILM COATED ORAL at 09:00

## 2021-05-21 RX ADMIN — IPRATROPIUM BROMIDE AND ALBUTEROL 1 PUFF: 20; 100 SPRAY, METERED RESPIRATORY (INHALATION) at 23:16

## 2021-05-21 RX ADMIN — IPRATROPIUM BROMIDE AND ALBUTEROL 1 PUFF: 20; 100 SPRAY, METERED RESPIRATORY (INHALATION) at 14:33

## 2021-05-21 RX ADMIN — FUROSEMIDE 40 MG: 40 TABLET ORAL at 08:59

## 2021-05-21 RX ADMIN — PIPERACILLIN SODIUM AND TAZOBACTAM SODIUM 4.5 G: 4; .5 INJECTION, POWDER, LYOPHILIZED, FOR SOLUTION INTRAVENOUS at 04:07

## 2021-05-21 RX ADMIN — PAROXETINE HYDROCHLORIDE 20 MG: 20 TABLET, FILM COATED ORAL at 09:00

## 2021-05-21 RX ADMIN — ENOXAPARIN SODIUM 105 MG: 120 INJECTION SUBCUTANEOUS at 04:07

## 2021-05-21 RX ADMIN — IPRATROPIUM BROMIDE AND ALBUTEROL 1 PUFF: 20; 100 SPRAY, METERED RESPIRATORY (INHALATION) at 09:01

## 2021-05-21 RX ADMIN — METOPROLOL TARTRATE 12.5 MG: 25 TABLET, FILM COATED ORAL at 20:47

## 2021-05-21 RX ADMIN — PIPERACILLIN SODIUM AND TAZOBACTAM SODIUM 4.5 G: 4; .5 INJECTION, POWDER, LYOPHILIZED, FOR SOLUTION INTRAVENOUS at 18:15

## 2021-05-21 ASSESSMENT — ACTIVITIES OF DAILY LIVING (ADL)
ADLS_ACUITY_SCORE: 23
ADLS_ACUITY_SCORE: 23
ADLS_ACUITY_SCORE: 22
ADLS_ACUITY_SCORE: 22
ADLS_ACUITY_SCORE: 23
ADLS_ACUITY_SCORE: 22

## 2021-05-21 NOTE — PLAN OF CARE
IMC status: Alert and oriented x4. Vitals stable on HFNC ex tachycardic. Hospitalist aware, monitor. LS diminished. Bowels sounds active. Incontinent of bowel. Cardona in place with adequate urine output. Turn/reposition q2h. L. Side weakness from previous CVAs. Dd2 nectar thick liquids. Continue to monitor.

## 2021-05-21 NOTE — PROVIDER NOTIFICATION
MD Notification    Notified Person: MD    Notified Person Name:  Sandro Maradiaga     Notification Date/Time:    Notification Interaction:     Purpose of Notification: Patient maintaining tachycardia (125), pt asymptomatic.     Orders Received:    Comments:

## 2021-05-21 NOTE — PLAN OF CARE
IMC. Recovered COVID, on special precautions. VSS. Tele NSR. On 30 LPM via high flow. LS diminished. Loose productive cough. AX2 with lift. Left sided weakness from previous CVA's. Turn and repo Q 2 hrs. Refuses at times. Pulsate mattress. Chronic chirinos patent with good output. Incontinent of stool. One soft BM today. Mepilex on coccyx changed. Skin dusky underneath.

## 2021-05-21 NOTE — PROGRESS NOTES
[facility]    Medicine Progress Note - Hospitalist Service       Date of Admission:  5/18/2021  Assessment & Plan       Ron Gilmore is a 80 year old male admitted on 5/18/2021.  Complex past medical history of CVA with residual left-sided weakness, COPD, chronic urinary retention with chronic indwelling Cardona catheter, history of septic shock secondary to E. 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  Also history of dyslipidemia, depression, diabetes mellitus type 2 diet-controlled and dysphagia and recent COVID-19 infection and recent admission to [facility] from 05/10 to 05/17 with acute diastolic congestive heart failure, hypoxic respiratory failure, possible pneumonia.  He is re admitted on 5/18/2021 with fever and possible worsening of oxygenation.       Hypoxic acute respiratory failure on HFNC  SIRS  Suspected pulmonary embolism   Pulmonary infiltrates   Chronic obstructive lung disease/emphysema  Sleep disordered breathing   Recent COVID-19 infection   Right peroneal vein DVT  CT negative for pulmonary embolism in main arteries but segmental or smaller not ruled out due to motion artifact.  D-dimer 1.7.  Lungs showed emphysema and patchy lower lobe infiltrates but small.  No pulmonary edema.  Pulmonary embolism is still a concern especially with the right calf deep venous thrombosis. CT does show emphysema and bilateral lower lobe infiltrates.  He was initially febrile but after starting antibiotics, he has no more fever.  No leukocytosis and pro-calcitonin is undetectable.    Continue enoxaparin     Continue intravenous Zosyn     Continue HFNC, will ask RT to try weaning today and if demonstrating no improvement will ask Pulmonology to consult    Continue oral furosemide     Continue inhaled bronchodilators      Chronic  indwelling urinary catheter  Urine culture growing Candida, as currently afebrile with negative blood cultures will not treat.     Continue catheter      Hypertension   Intermittent atrial fibrillation not on anticoagulation   Chronic diastolic congestive heart failure   Appears compensated.  BNP down from 4500 at recent discharge to 800.  No pulmonary edema on CT scan.    Continue aspirin, furosemide    Now on anticoagulation as above      Type 2 diabetes mellitus     Stable, continue dietary control      Depression     Continue paroxetine      Stroke history   He was not on anticoagulation for atrial fibrillation but is currently being anticoagulated for the deep venous thrombosis.  - Continue aspirin and statin    Non-severe malnutrition  Has had <50% oral intake for >1 month with mild subcutaneous fat and muscle loss on exam.   - nutrition following         Diet: Combination Diet Regular Diet Adult; Dysphagia Diet Level 2: Mechan Altered; Nectar Thickened Liquids (pre-thickened or use instant food thickener)    DVT Prophylaxis: therapeutic lovenox  Cardona Catheter: in place, indication: Retention(chronic)  Code Status: Full Code           Disposition Plan   Expected discharge: 4 - 7 days, recommended to transitional care unit once O2 use less than 3 liters/minute.  Entered: Steve Hernandez MD 05/21/2021, 10:15 AM       The patient's care was discussed with the Bedside Nurse, Patient and Patient's Family.    Steve Hernandez MD  Hospitalist Service  [facility]  Contact information available via [facility] Paging/Directory    ______________________________________________________________________    Interval History   He reports subjective fever overnight, no change in respiratory status since arrival.  Non-productive cough.  No orthopnea, no chest pain/pressure, no wheezing.  Mild nausea overnight, no abdominal pain, distension or diarrhea.  No other complaints.     Data reviewed today: I reviewed  all medications, new labs and imaging results over the last 24 hours. I personally reviewed no images or EKG's today.    Physical Exam   Vital Signs: Temp: 97.8  F (36.6  C) Temp src: Oral BP: 113/80 Pulse: 97   Resp: 13 SpO2: 94 % O2 Device: High Flow Nasal Cannula (HFNC) Oxygen Delivery: 35 LPM  Weight: 225 lbs 8.49 oz  General Appearance: Elderly male in NAD  Respiratory: bibasilar crackles, diminished lung sounds, slight expiratory wheezing, no tachypnea on HFNC  Cardiovascular: RRR, normal s1/s2 without murmur  GI: abdomen soft, normal bowel sounds  Skin: no peripheral edema  Other: Alert and appropriate, cranial nerves grossly intact     Data   Recent Labs   Lab 05/20/21  0705 05/18/21  1938 05/17/21  0657 05/15/21  1155 05/15/21  0703 05/15/21  0703   WBC 8.0 8.5  --  7.2  --   --    HGB 8.7* 10.5* 9.6* 9.4*   < >  --    MCV 89 88  --  89  --   --     286 251 198   < >  --     143  --   --   --  145*   POTASSIUM 3.7 3.4  --   --   --  4.0   CHLORIDE 104 105  --   --   --  113*   CO2 35* 34*  --   --   --  28   BUN 28 24  --   --   --  19   CR 1.17 1.17 1.07  --   --  1.05   ANIONGAP 3 4  --   --   --  4   RAJ 8.5 8.8  --   --   --  8.1*   * 147*  --   --   --  88   TROPI  --  <0.015  --   --   --   --     < > = values in this interval not displayed.

## 2021-05-21 NOTE — PROGRESS NOTES
Care Management Follow Up    Length of Stay (days): 3    Expected Discharge Date: 05/24/21     Concerns to be Addressed:       Patient plan of care discussed at interdisciplinary rounds: Yes    Anticipated Discharge Disposition:       Anticipated Discharge Services:    Anticipated Discharge DME:      Patient/family educated on Medicare website which has current facility and service quality ratings:    Education Provided on the Discharge Plan:    Patient/Family in Agreement with the Plan:      Referrals Placed by CM/SW:    Private pay costs discussed: Not applicable    Additional Information:  [place] [facility] [place] bed hold.       TIFFANY Bartholomew

## 2021-05-22 LAB
ANION GAP SERPL CALCULATED.3IONS-SCNC: 5 MMOL/L (ref 3–14)
BUN SERPL-MCNC: 38 MG/DL (ref 7–30)
CALCIUM SERPL-MCNC: 8.6 MG/DL (ref 8.5–10.1)
CHLORIDE SERPL-SCNC: 104 MMOL/L (ref 94–109)
CO2 SERPL-SCNC: 34 MMOL/L (ref 20–32)
CREAT SERPL-MCNC: 1.26 MG/DL (ref 0.66–1.25)
CRP SERPL-MCNC: 49.5 MG/L (ref 0–8)
ERYTHROCYTE [DISTWIDTH] IN BLOOD BY AUTOMATED COUNT: 17.6 % (ref 10–15)
GFR SERPL CREATININE-BSD FRML MDRD: 54 ML/MIN/{1.73_M2}
GLUCOSE SERPL-MCNC: 85 MG/DL (ref 70–99)
HCT VFR BLD AUTO: 28.4 % (ref 40–53)
HGB BLD-MCNC: 8.1 G/DL (ref 13.3–17.7)
MCH RBC QN AUTO: 25.9 PG (ref 26.5–33)
MCHC RBC AUTO-ENTMCNC: 28.5 G/DL (ref 31.5–36.5)
MCV RBC AUTO: 91 FL (ref 78–100)
PLATELET # BLD AUTO: 166 10E9/L (ref 150–450)
POTASSIUM SERPL-SCNC: 3.9 MMOL/L (ref 3.4–5.3)
RBC # BLD AUTO: 3.13 10E12/L (ref 4.4–5.9)
SODIUM SERPL-SCNC: 143 MMOL/L (ref 133–144)
WBC # BLD AUTO: 6.1 10E9/L (ref 4–11)

## 2021-05-22 PROCEDURE — 258N000003 HC RX IP 258 OP 636: Performed by: HOSPITALIST

## 2021-05-22 PROCEDURE — 99232 SBSQ HOSP IP/OBS MODERATE 35: CPT | Performed by: HOSPITALIST

## 2021-05-22 PROCEDURE — 999N000157 HC STATISTIC RCP TIME EA 10 MIN

## 2021-05-22 PROCEDURE — 86140 C-REACTIVE PROTEIN: CPT | Performed by: HOSPITALIST

## 2021-05-22 PROCEDURE — 250N000013 HC RX MED GY IP 250 OP 250 PS 637: Performed by: HOSPITALIST

## 2021-05-22 PROCEDURE — 85027 COMPLETE CBC AUTOMATED: CPT | Performed by: INTERNAL MEDICINE

## 2021-05-22 PROCEDURE — 120N000013 HC R&B IMCU

## 2021-05-22 PROCEDURE — 250N000013 HC RX MED GY IP 250 OP 250 PS 637: Performed by: INTERNAL MEDICINE

## 2021-05-22 PROCEDURE — 80048 BASIC METABOLIC PNL TOTAL CA: CPT | Performed by: HOSPITALIST

## 2021-05-22 PROCEDURE — 250N000011 HC RX IP 250 OP 636: Performed by: INTERNAL MEDICINE

## 2021-05-22 PROCEDURE — 36415 COLL VENOUS BLD VENIPUNCTURE: CPT | Performed by: HOSPITALIST

## 2021-05-22 PROCEDURE — 250N000011 HC RX IP 250 OP 636: Performed by: HOSPITALIST

## 2021-05-22 RX ADMIN — IPRATROPIUM BROMIDE AND ALBUTEROL 1 PUFF: 20; 100 SPRAY, METERED RESPIRATORY (INHALATION) at 13:34

## 2021-05-22 RX ADMIN — SODIUM CHLORIDE 500 ML: 9 INJECTION, SOLUTION INTRAVENOUS at 21:54

## 2021-05-22 RX ADMIN — METOPROLOL TARTRATE 12.5 MG: 25 TABLET, FILM COATED ORAL at 09:02

## 2021-05-22 RX ADMIN — ASPIRIN 81 MG: 81 TABLET, COATED ORAL at 09:02

## 2021-05-22 RX ADMIN — ATORVASTATIN CALCIUM 10 MG: 10 TABLET, FILM COATED ORAL at 09:02

## 2021-05-22 RX ADMIN — IPRATROPIUM BROMIDE AND ALBUTEROL 1 PUFF: 20; 100 SPRAY, METERED RESPIRATORY (INHALATION) at 18:47

## 2021-05-22 RX ADMIN — PIPERACILLIN SODIUM AND TAZOBACTAM SODIUM 4.5 G: 4; .5 INJECTION, POWDER, LYOPHILIZED, FOR SOLUTION INTRAVENOUS at 05:38

## 2021-05-22 RX ADMIN — IPRATROPIUM BROMIDE AND ALBUTEROL 1 PUFF: 20; 100 SPRAY, METERED RESPIRATORY (INHALATION) at 21:54

## 2021-05-22 RX ADMIN — Medication 500 MCG: at 09:01

## 2021-05-22 RX ADMIN — PAROXETINE HYDROCHLORIDE 20 MG: 20 TABLET, FILM COATED ORAL at 09:02

## 2021-05-22 RX ADMIN — IPRATROPIUM BROMIDE AND ALBUTEROL 1 PUFF: 20; 100 SPRAY, METERED RESPIRATORY (INHALATION) at 10:14

## 2021-05-22 RX ADMIN — PIPERACILLIN SODIUM AND TAZOBACTAM SODIUM 4.5 G: 4; .5 INJECTION, POWDER, LYOPHILIZED, FOR SOLUTION INTRAVENOUS at 16:25

## 2021-05-22 RX ADMIN — POLYETHYLENE GLYCOL 3350 17 G: 17 POWDER, FOR SOLUTION ORAL at 09:03

## 2021-05-22 RX ADMIN — PIPERACILLIN SODIUM AND TAZOBACTAM SODIUM 4.5 G: 4; .5 INJECTION, POWDER, LYOPHILIZED, FOR SOLUTION INTRAVENOUS at 22:59

## 2021-05-22 RX ADMIN — ALLOPURINOL 300 MG: 300 TABLET ORAL at 09:01

## 2021-05-22 RX ADMIN — ENOXAPARIN SODIUM 105 MG: 120 INJECTION SUBCUTANEOUS at 05:38

## 2021-05-22 RX ADMIN — FUROSEMIDE 40 MG: 40 TABLET ORAL at 09:01

## 2021-05-22 RX ADMIN — ENOXAPARIN SODIUM 105 MG: 120 INJECTION SUBCUTANEOUS at 18:45

## 2021-05-22 RX ADMIN — PIPERACILLIN SODIUM AND TAZOBACTAM SODIUM 4.5 G: 4; .5 INJECTION, POWDER, LYOPHILIZED, FOR SOLUTION INTRAVENOUS at 11:42

## 2021-05-22 NOTE — PROGRESS NOTES
[facility]    Medicine Progress Note - Hospitalist Service       Date of Admission:  5/18/2021  Assessment & Plan       Ron Gilmore is a 80 year old male admitted on 5/18/2021.  Complex past medical history of CVA with residual left-sided weakness, COPD, chronic urinary retention with chronic indwelling Cardona catheter, history of septic shock secondary to E. 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  Also history of dyslipidemia, depression, diabetes mellitus type 2 diet-controlled and dysphagia and recent COVID-19 infection and recent admission to [facility] from 05/10 to 05/17 with acute diastolic congestive heart failure, hypoxic respiratory failure, possible pneumonia.  He is re admitted on 5/18/2021 with fever and possible worsening of oxygenation.       Hypoxic acute respiratory failure on HFNC  SIRS  Suspected pulmonary embolism   Pulmonary infiltrates   Chronic obstructive lung disease/emphysema  Sleep disordered breathing   Recent COVID-19 infection   Right peroneal vein DVT  CT negative for pulmonary embolism in main arteries but segmental or smaller not ruled out due to motion artifact.  D-dimer 1.7.  Lungs showed emphysema and patchy lower lobe infiltrates but small.  No pulmonary edema.  Pulmonary embolism is still a concern especially with the right calf deep venous thrombosis. CT does show emphysema and bilateral lower lobe infiltrates.  He was initially febrile but after starting antibiotics, he has no more fever.  No leukocytosis and pro-calcitonin is undetectable.    Continue enoxaparin     Continue intravenous Zosyn     Wean HFNC as able    Continue oral furosemide; note bump in Cr 5/22 will repeat BMP tomorrow    Continue inhaled bronchodilators     Counseled on importance of compliance with IS, will add  flutter valve as well     Chronic indwelling urinary catheter  Urine culture growing Candida, as currently afebrile with negative blood cultures will not treat.     Continue catheter      Hypertension   Intermittent atrial fibrillation not on anticoagulation   Chronic diastolic congestive heart failure   Appears compensated.  BNP down from 4500 at recent discharge to 800.  No pulmonary edema on CT scan.    Continue aspirin, furosemide    Now on anticoagulation as above      Type 2 diabetes mellitus     Stable, continue dietary control      Depression     Continue paroxetine      Stroke history   He was not on anticoagulation for atrial fibrillation but is currently being anticoagulated for the deep venous thrombosis.  - Continue aspirin and statin    Non-severe malnutrition  Has had <50% oral intake for >1 month with mild subcutaneous fat and muscle loss on exam.   - nutrition following         Diet: Combination Diet Regular Diet Adult; Dysphagia Diet Level 2: Mechan Altered; Nectar Thickened Liquids (pre-thickened or use instant food thickener)    DVT Prophylaxis: therapeutic lovenox  Cardona Catheter: in place, indication: Retention  Code Status: Full Code           Disposition Plan   Expected discharge: 2 - 3 days, recommended to transitional care unit once O2 use less than 3 liters/minute.  Entered: Steve Hernandez MD 05/22/2021, 10:15 AM       The patient's care was discussed with the Bedside Nurse, Patient and Patient's Family.    Steve Hernandez MD  Hospitalist Service  [facility]  Contact information available via [facility] Paging/Directory    ______________________________________________________________________    Interval History   Reports no change in respiratory status.  No subjective fever overnight.  Denies chest pain/pressure, wheezing, has a minimal cough.  Has not been participating with IS.    Data reviewed today: I reviewed all medications, new labs and imaging results over the  last 24 hours. I personally reviewed no images or EKG's today.    Physical Exam   Vital Signs: Temp: 97.4  F (36.3  C) Temp src: Oral BP: 127/69 Pulse: 78   Resp: 15 SpO2: 98 % O2 Device: High Flow Nasal Cannula (HFNC) Oxygen Delivery: 30 LPM  Weight: 211 lbs 13.79 oz  General Appearance: Elderly male in NAD, lying in bed  Respiratory: bibasilar crackles, diminished lung sounds, no wheezing, no tachypnea on HFNC  Cardiovascular: RRR, normal s1/s2 without murmur  GI: abdomen soft, normal bowel sounds  Skin: no peripheral edema  Other: Alert and appropriate, cranial nerves grossly intact     Data   Recent Labs   Lab 05/22/21  0611 05/21/21  1047 05/20/21  0705 05/18/21  1938   WBC 6.1 6.9 8.0 8.5   HGB 8.1* 8.5* 8.7* 10.5*   MCV 91 90 89 88    173 190 286    139 142 143   POTASSIUM 3.9 5.2 3.7 3.4   CHLORIDE 104 102 104 105   CO2 34* 35* 35* 34*   BUN 38* 36* 28 24   CR 1.26* 1.18 1.17 1.17   ANIONGAP 5 2* 3 4   RAJ 8.6 8.9 8.5 8.8   GLC 85 114* 107* 147*   TROPI  --   --   --  <0.015

## 2021-05-22 NOTE — PLAN OF CARE
[facility]. Recovered covid, on special precautions. A&Ox4. VSS, tachy at times. HFNC 30L, 60%. Assist of 2, turn/repo, refuses at times. Tolerating Dd2, nectar thick diet. Lung sounds diminished, ROSS. Infrequent, productive cough. Bowel sounds active. L sided weakness from previous CVA. Adequate urine output via chirinos. Mepilex on coccyx, dusky. Denies pain.

## 2021-05-23 ENCOUNTER — APPOINTMENT (OUTPATIENT)
Dept: SPEECH THERAPY | Facility: CLINIC | Age: 81
DRG: 871 | End: 2021-05-23
Payer: COMMERCIAL

## 2021-05-23 LAB
ANION GAP SERPL CALCULATED.3IONS-SCNC: 3 MMOL/L (ref 3–14)
BUN SERPL-MCNC: 46 MG/DL (ref 7–30)
CALCIUM SERPL-MCNC: 8.7 MG/DL (ref 8.5–10.1)
CHLORIDE SERPL-SCNC: 107 MMOL/L (ref 94–109)
CO2 SERPL-SCNC: 37 MMOL/L (ref 20–32)
CREAT SERPL-MCNC: 1.32 MG/DL (ref 0.66–1.25)
ERYTHROCYTE [DISTWIDTH] IN BLOOD BY AUTOMATED COUNT: 18 % (ref 10–15)
GFR SERPL CREATININE-BSD FRML MDRD: 51 ML/MIN/{1.73_M2}
GLUCOSE SERPL-MCNC: 115 MG/DL (ref 70–99)
HCT VFR BLD AUTO: 26.9 % (ref 40–53)
HGB BLD-MCNC: 7.6 G/DL (ref 13.3–17.7)
HGB BLD-MCNC: 7.8 G/DL (ref 13.3–17.7)
HGB BLD-MCNC: 8.1 G/DL (ref 13.3–17.7)
MCH RBC QN AUTO: 26.7 PG (ref 26.5–33)
MCHC RBC AUTO-ENTMCNC: 29 G/DL (ref 31.5–36.5)
MCV RBC AUTO: 92 FL (ref 78–100)
PLATELET # BLD AUTO: 212 10E9/L (ref 150–450)
POTASSIUM SERPL-SCNC: 3.4 MMOL/L (ref 3.4–5.3)
RBC # BLD AUTO: 2.92 10E12/L (ref 4.4–5.9)
SODIUM SERPL-SCNC: 147 MMOL/L (ref 133–144)
WBC # BLD AUTO: 5.7 10E9/L (ref 4–11)

## 2021-05-23 PROCEDURE — 86923 COMPATIBILITY TEST ELECTRIC: CPT | Performed by: HOSPITALIST

## 2021-05-23 PROCEDURE — C9113 INJ PANTOPRAZOLE SODIUM, VIA: HCPCS | Performed by: HOSPITALIST

## 2021-05-23 PROCEDURE — 86901 BLOOD TYPING SEROLOGIC RH(D): CPT | Performed by: HOSPITALIST

## 2021-05-23 PROCEDURE — 85027 COMPLETE CBC AUTOMATED: CPT | Performed by: HOSPITALIST

## 2021-05-23 PROCEDURE — 86850 RBC ANTIBODY SCREEN: CPT | Performed by: HOSPITALIST

## 2021-05-23 PROCEDURE — 36415 COLL VENOUS BLD VENIPUNCTURE: CPT | Performed by: HOSPITALIST

## 2021-05-23 PROCEDURE — 120N000013 HC R&B IMCU

## 2021-05-23 PROCEDURE — 80048 BASIC METABOLIC PNL TOTAL CA: CPT | Performed by: HOSPITALIST

## 2021-05-23 PROCEDURE — 92526 ORAL FUNCTION THERAPY: CPT | Mod: GN | Performed by: SPEECH-LANGUAGE PATHOLOGIST

## 2021-05-23 PROCEDURE — 250N000011 HC RX IP 250 OP 636: Performed by: HOSPITALIST

## 2021-05-23 PROCEDURE — 99233 SBSQ HOSP IP/OBS HIGH 50: CPT | Performed by: HOSPITALIST

## 2021-05-23 PROCEDURE — 250N000013 HC RX MED GY IP 250 OP 250 PS 637: Performed by: INTERNAL MEDICINE

## 2021-05-23 PROCEDURE — 85018 HEMOGLOBIN: CPT | Performed by: HOSPITALIST

## 2021-05-23 PROCEDURE — 250N000011 HC RX IP 250 OP 636: Performed by: INTERNAL MEDICINE

## 2021-05-23 PROCEDURE — 86900 BLOOD TYPING SEROLOGIC ABO: CPT | Performed by: HOSPITALIST

## 2021-05-23 RX ADMIN — ENOXAPARIN SODIUM 105 MG: 120 INJECTION SUBCUTANEOUS at 05:11

## 2021-05-23 RX ADMIN — METOPROLOL TARTRATE 12.5 MG: 25 TABLET, FILM COATED ORAL at 21:06

## 2021-05-23 RX ADMIN — ONDANSETRON 4 MG: 2 INJECTION INTRAMUSCULAR; INTRAVENOUS at 14:01

## 2021-05-23 RX ADMIN — PIPERACILLIN SODIUM AND TAZOBACTAM SODIUM 4.5 G: 4; .5 INJECTION, POWDER, LYOPHILIZED, FOR SOLUTION INTRAVENOUS at 23:20

## 2021-05-23 RX ADMIN — ATORVASTATIN CALCIUM 10 MG: 10 TABLET, FILM COATED ORAL at 09:46

## 2021-05-23 RX ADMIN — ALLOPURINOL 300 MG: 300 TABLET ORAL at 09:46

## 2021-05-23 RX ADMIN — IPRATROPIUM BROMIDE AND ALBUTEROL 1 PUFF: 20; 100 SPRAY, METERED RESPIRATORY (INHALATION) at 21:12

## 2021-05-23 RX ADMIN — PIPERACILLIN SODIUM AND TAZOBACTAM SODIUM 4.5 G: 4; .5 INJECTION, POWDER, LYOPHILIZED, FOR SOLUTION INTRAVENOUS at 05:11

## 2021-05-23 RX ADMIN — IPRATROPIUM BROMIDE AND ALBUTEROL 1 PUFF: 20; 100 SPRAY, METERED RESPIRATORY (INHALATION) at 09:46

## 2021-05-23 RX ADMIN — Medication 500 MCG: at 09:46

## 2021-05-23 RX ADMIN — METOPROLOL TARTRATE 12.5 MG: 25 TABLET, FILM COATED ORAL at 09:46

## 2021-05-23 RX ADMIN — PAROXETINE HYDROCHLORIDE 20 MG: 20 TABLET, FILM COATED ORAL at 09:46

## 2021-05-23 RX ADMIN — PIPERACILLIN SODIUM AND TAZOBACTAM SODIUM 4.5 G: 4; .5 INJECTION, POWDER, LYOPHILIZED, FOR SOLUTION INTRAVENOUS at 11:16

## 2021-05-23 RX ADMIN — IPRATROPIUM BROMIDE AND ALBUTEROL 1 PUFF: 20; 100 SPRAY, METERED RESPIRATORY (INHALATION) at 13:56

## 2021-05-23 RX ADMIN — PANTOPRAZOLE SODIUM 40 MG: 40 INJECTION, POWDER, FOR SOLUTION INTRAVENOUS at 21:07

## 2021-05-23 RX ADMIN — PANTOPRAZOLE SODIUM 40 MG: 40 INJECTION, POWDER, FOR SOLUTION INTRAVENOUS at 09:46

## 2021-05-23 RX ADMIN — PIPERACILLIN SODIUM AND TAZOBACTAM SODIUM 4.5 G: 4; .5 INJECTION, POWDER, LYOPHILIZED, FOR SOLUTION INTRAVENOUS at 16:38

## 2021-05-23 ASSESSMENT — ACTIVITIES OF DAILY LIVING (ADL)
ADLS_ACUITY_SCORE: 24
ADLS_ACUITY_SCORE: 25
ADLS_ACUITY_SCORE: 24
ADLS_ACUITY_SCORE: 25

## 2021-05-23 NOTE — PROGRESS NOTES
Brief hospitalist note - cross cover      Paged by bedside RN with concern of softer blood pressures with SBP in the upper 80s recently.  No new symptoms or concern reported.  I do note patient has history of diastolic heart failure and has been treated for essentially multifactorial hypoxic respiratory failure.  Per RN report, patient has not had much intake and I see that his daily weights are drastically down.  Weights are of questionable reliability however BUN to creatinine ratio has also increased indicating he is likely on the slightly dry side.  With that said we will cautiously give half liter normal saline over 3 to 4 hours and monitor for fluid overload.  Orders placed and discussed with RN.

## 2021-05-23 NOTE — PROGRESS NOTES
[facility]    Medicine Progress Note - Hospitalist Service       Date of Admission:  5/18/2021  Assessment & Plan       Ron Gilmore is a 80 year old male admitted on 5/18/2021.  Complex past medical history of CVA with residual left-sided weakness, COPD, chronic urinary retention with chronic indwelling Cardona catheter, history of septic shock secondary to E. 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  Also history of dyslipidemia, depression, diabetes mellitus type 2 diet-controlled and dysphagia and recent COVID-19 infection and recent admission to [facility] from 05/10 to 05/17 with acute diastolic congestive heart failure, hypoxic respiratory failure, possible pneumonia.  He is re admitted on 5/18/2021 with fever and possible worsening of oxygenation.       Hypoxic acute respiratory failure on HFNC  SIRS  Suspected pulmonary embolism   Pulmonary infiltrates   Chronic obstructive lung disease/emphysema  Sleep disordered breathing   Recent COVID-19 infection   Right peroneal vein DVT  CT negative for pulmonary embolism in main arteries but segmental or smaller not ruled out due to motion artifact.  D-dimer 1.7.  Lungs showed emphysema and patchy lower lobe infiltrates but small.  No pulmonary edema.  Pulmonary embolism is still a concern especially with the right calf deep venous thrombosis. CT does show emphysema and bilateral lower lobe infiltrates.  He was initially febrile but after starting antibiotics, he has no more fever.  No leukocytosis and pro-calcitonin is undetectable.  * weaned from HFNC 5/22    Continue enoxaparin     Continue intravenous Zosyn (day 5)    Wean oxygen as able    Soft pressures last evening and fluid bolus given, will hold lasix this AM pending BMP    Continue inhaled bronchodilators     Aggressive  pulm toilet with IS and flutter valve    Acute on chronic anemia  Hgb 3/2021 wnl but fell to about 10 g/dL following hospitalization that month and stable at admission.    - hgb 10 > 7.8 g/dL over past 5 days  - no signs of blood loss; chart review shows colonoscopy in 2006 with 3 polyps that were resected and he reports subsequent colonoscopy with no polyps and denies hx of GIB  - start PPI IV bid  - with soft pressures last evening and mild tachycardia will monitor hgb q8h  - hold aspirin but continue lovenox for now  - he is agreeable to transfusion if necessary, consented and form in chart    ADDENDUM:  Notified by RN of large black stool later this AM.  Clinical picture would suggest upper GIB, will hold lovenox this evening.  Ask GI to follow though current respiratory status precludes EGD.     Chronic indwelling urinary catheter  Urine culture growing Candida, as currently afebrile with negative blood cultures will not treat.     Continue catheter      Hypertension   Intermittent atrial fibrillation not on anticoagulation   Chronic diastolic congestive heart failure   Appears compensated.  BNP down from 4500 at recent discharge to 800.  No pulmonary edema on CT scan.    Hold aspirin and lasix as above    Now on anticoagulation as above      Type 2 diabetes mellitus     Stable, continue dietary control      Depression     Continue paroxetine      Stroke history   He was not on anticoagulation for atrial fibrillation but is currently being anticoagulated for the deep venous thrombosis.  - Continue aspirin and statin    Non-severe malnutrition  Has had <50% oral intake for >1 month with mild subcutaneous fat and muscle loss on exam.   - nutrition following         Diet: Combination Diet Regular Diet Adult; Dysphagia Diet Level 2: Mechan Altered; Nectar Thickened Liquids (pre-thickened or use instant food thickener)  Snacks/Supplements Adult: Gelatein Plus; Between Meals    DVT Prophylaxis: therapeutic  lovenox  Cardona Catheter: in place, indication: Retention  Code Status: Full Code           Disposition Plan   Expected discharge: 4 - 7 days, recommended to transitional care unit once O2 use less than 3 liters/minute.  Entered: Steve Hernandez MD 05/23/2021, 8:04 AM       The patient's care was discussed with the Bedside Nurse, Patient and Patient's Family.    Steve Hernandez MD  Hospitalist Service  [facility]  Contact information available via [facility] Paging/Directory    ______________________________________________________________________    Interval History   Reports respiratory status is stable, no change.  No fever/chills.  Reports mild lightheadedness but this has been ongoing and is unchanged.  Denies chest discomfort or abdominal pain or flank pain.     RN reports no signs of melena or hematochezia.    Data reviewed today: I reviewed all medications, new labs and imaging results over the last 24 hours. I personally reviewed no images or EKG's today.    Physical Exam   Vital Signs: Temp: 97.9  F (36.6  C) Temp src: Oral BP: 115/67 Pulse: 109   Resp: 12 SpO2: 96 % O2 Device: Oxymizer cannula Oxygen Delivery: 10 LPM  Weight: 212 lbs 15.43 oz     General Appearance: Elderly male in NAD, lying in bed  Respiratory: mildly improved basilar aeration but persisting crackles, no wheezing or tachypnea on oxymizer  Cardiovascular: Regular rhythm, mild tachycardia, normal s1/s2 without murmur  GI: abdomen soft, normal bowel sounds, nontender, nondistended  Skin: trace ankle edema  Other: Alert and appropriate, cranial nerves grossly intact     Data   Recent Labs   Lab 05/23/21  0745 05/22/21  0611 05/21/21  1047 05/20/21  0705 05/18/21  1938   WBC 5.7 6.1 6.9 8.0 8.5   HGB 7.8* 8.1* 8.5* 8.7* 10.5*   MCV 92 91 90 89 88    166 173 190 286   NA  --  143 139 142 143   POTASSIUM  --  3.9 5.2 3.7 3.4   CHLORIDE  --  104 102 104 105   CO2  --  34* 35* 35* 34*   BUN  --  38* 36* 28 24   CR  --   1.26* 1.18 1.17 1.17   ANIONGAP  --  5 2* 3 4   RAJ  --  8.6 8.9 8.5 8.8   GLC  --  85 114* 107* 147*   TROPI  --   --   --   --  <0.015

## 2021-05-23 NOTE — PLAN OF CARE
[facility]. Recovered covid, on special precautions. A&Ox4. VSS, tachy at times. 10L oxymizer cannula. Assist of 2, turn/repo, refuses at times. Tolerating Dd2, nectar thick diet. Lung sounds diminished, ROSS. Infrequent, productive cough. Bowel sounds active, large BM, incontinent. L sided weakness from previous CVA. Adequate urine output via chirinos. Mepilex on coccyx, dusky. Denies pain.

## 2021-05-23 NOTE — PLAN OF CARE
[facility]. Recovered COVID precautions maintained. Oriented x4 but sleepy between cares today. Tele: Sinus tach. BP'S soft at times. LS diminished throughout and coarse in the bases. Working on acapella with encouragement. On 10LPM via oxymizer tolerating well. AX2 and lift turn and repo Q2 hrs. Patient refuses at times. Hemoglobin checks Q8hrs. 7.8 this morning and 8.1 this evening. 2 large black stools today. MD notified, GI consult placed, Transfuse if below 7. DD2 with nectar thick liquids, fair appetite. zofran x1 for nausea. Cardona with adequate output. mepilex on coccyx changed x2.     Addendum: GI consulted and would like to keep patient NPO overnight and reevaluate tomorrow.

## 2021-05-23 NOTE — CONSULTS
GI aware of the consult and will see pt tmr am.     In summary:   80 year old male admitted on 5/18/2021.  Complex past medical history of CVA with residual left-sided weakness, COPD, chronic urinary retention with chronic indwelling Cardona catheter, history of septic shock secondary to E. 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  Also history of dyslipidemia, depression, diabetes mellitus type 2 diet-controlled and dysphagia and recent COVID-19 infection and recent admission to [facility] from 05/10 to 05/17 with acute diastolic congestive heart failure, hypoxic respiratory failure, possible pneumonia.  He is re admitted on 5/18/2021 with fever and possible worsening of oxygenation.  -Hgb has been coming down slowly thru out the week, now reported to have melena  -will keep pt NPO  -IV PPI BID after 80 mg loading   -if EGD needed, pt may need to be intubated hence will hold until absolutely necessary, if acute decompensated overnight then will need to have the procedure done in ICU    Aurora Waterman MD (Richard)  Provider's Cell: [phone]     [facility] GI consultant [place]  [phone]

## 2021-05-23 NOTE — PLAN OF CARE
[facility]. Recovered COVID, on special precautions. VSS. Tele NSR.Weaned from 30 LPM via high flow to 8LPM via oxymiezer. LS diminished with fine crackles in bases. Loose productive cough. AX2 with lift. Left sided weakness from previous CVA's. Turn and repo Q 2 hrs. Refuses at times. Pulsate mattress. Chronic chirinos patent with good output. Incontinent of stool. One BM today. Mepilex on coccyx changed. Skin dusky underneath. BP's soft at end of shift. On call MD paged and ordered a fluid bolus.

## 2021-05-23 NOTE — PROGRESS NOTES
.MD Notification    Notified Person: MD    Notified Person Name: Karen    Notification Date/Time: 5/23/21 1123    Notification Interaction: web based paging    Purpose of Notification: patient had large black BM which was new from yesterday     Orders Received: GI consult placed    Comments:

## 2021-05-24 ENCOUNTER — APPOINTMENT (OUTPATIENT)
Dept: INTERVENTIONAL RADIOLOGY/VASCULAR | Facility: CLINIC | Age: 81
DRG: 871 | End: 2021-05-24
Attending: HOSPITALIST
Payer: COMMERCIAL

## 2021-05-24 LAB
ABO + RH BLD: NORMAL
ABO + RH BLD: NORMAL
ANION GAP SERPL CALCULATED.3IONS-SCNC: 2 MMOL/L (ref 3–14)
BLD GP AB SCN SERPL QL: NORMAL
BLD PROD TYP BPU: NORMAL
BLD PROD TYP BPU: NORMAL
BLD UNIT ID BPU: 0
BLOOD BANK CMNT PATIENT-IMP: NORMAL
BLOOD PRODUCT CODE: NORMAL
BPU ID: NORMAL
BUN SERPL-MCNC: 48 MG/DL (ref 7–30)
CALCIUM SERPL-MCNC: 8.7 MG/DL (ref 8.5–10.1)
CHLORIDE SERPL-SCNC: 112 MMOL/L (ref 94–109)
CO2 SERPL-SCNC: 36 MMOL/L (ref 20–32)
CREAT SERPL-MCNC: 1.36 MG/DL (ref 0.66–1.25)
GFR SERPL CREATININE-BSD FRML MDRD: 49 ML/MIN/{1.73_M2}
GLUCOSE SERPL-MCNC: 110 MG/DL (ref 70–99)
HGB BLD-MCNC: 6.6 G/DL (ref 13.3–17.7)
HGB BLD-MCNC: 7.2 G/DL (ref 13.3–17.7)
HGB BLD-MCNC: 7.9 G/DL (ref 13.3–17.7)
NUM BPU REQUESTED: 1
POTASSIUM SERPL-SCNC: 3.7 MMOL/L (ref 3.4–5.3)
SODIUM SERPL-SCNC: 150 MMOL/L (ref 133–144)
SPECIMEN EXP DATE BLD: NORMAL
TRANSFUSION STATUS PATIENT QL: NORMAL
TRANSFUSION STATUS PATIENT QL: NORMAL

## 2021-05-24 PROCEDURE — C1880 VENA CAVA FILTER: HCPCS

## 2021-05-24 PROCEDURE — 255N000002 HC RX 255 OP 636: Performed by: RADIOLOGY

## 2021-05-24 PROCEDURE — 80048 BASIC METABOLIC PNL TOTAL CA: CPT | Performed by: HOSPITALIST

## 2021-05-24 PROCEDURE — P9016 RBC LEUKOCYTES REDUCED: HCPCS | Performed by: HOSPITALIST

## 2021-05-24 PROCEDURE — 272N000196 HC ACCESSORY CR5

## 2021-05-24 PROCEDURE — 120N000013 HC R&B IMCU

## 2021-05-24 PROCEDURE — 36415 COLL VENOUS BLD VENIPUNCTURE: CPT | Performed by: HOSPITALIST

## 2021-05-24 PROCEDURE — C9113 INJ PANTOPRAZOLE SODIUM, VIA: HCPCS | Performed by: HOSPITALIST

## 2021-05-24 PROCEDURE — 250N000009 HC RX 250: Performed by: NURSE PRACTITIONER

## 2021-05-24 PROCEDURE — 99233 SBSQ HOSP IP/OBS HIGH 50: CPT | Performed by: HOSPITALIST

## 2021-05-24 PROCEDURE — 250N000011 HC RX IP 250 OP 636: Performed by: HOSPITALIST

## 2021-05-24 PROCEDURE — 85018 HEMOGLOBIN: CPT | Performed by: HOSPITALIST

## 2021-05-24 PROCEDURE — G0463 HOSPITAL OUTPT CLINIC VISIT: HCPCS

## 2021-05-24 PROCEDURE — 250N000011 HC RX IP 250 OP 636: Performed by: NURSE PRACTITIONER

## 2021-05-24 PROCEDURE — 250N000011 HC RX IP 250 OP 636: Performed by: INTERNAL MEDICINE

## 2021-05-24 PROCEDURE — C1769 GUIDE WIRE: HCPCS

## 2021-05-24 PROCEDURE — 37191 INS ENDOVAS VENA CAVA FILTR: CPT

## 2021-05-24 PROCEDURE — 999N000127 HC STATISTIC PERIPHERAL IV START W US GUIDANCE

## 2021-05-24 PROCEDURE — 36430 TRANSFUSION BLD/BLD COMPNT: CPT

## 2021-05-24 PROCEDURE — 250N000013 HC RX MED GY IP 250 OP 250 PS 637: Performed by: INTERNAL MEDICINE

## 2021-05-24 RX ORDER — NALOXONE HYDROCHLORIDE 0.4 MG/ML
0.4 INJECTION, SOLUTION INTRAMUSCULAR; INTRAVENOUS; SUBCUTANEOUS
Status: DISCONTINUED | OUTPATIENT
Start: 2021-05-24 | End: 2021-05-24

## 2021-05-24 RX ORDER — NALOXONE HYDROCHLORIDE 0.4 MG/ML
0.2 INJECTION, SOLUTION INTRAMUSCULAR; INTRAVENOUS; SUBCUTANEOUS
Status: DISCONTINUED | OUTPATIENT
Start: 2021-05-24 | End: 2021-05-24

## 2021-05-24 RX ORDER — IOPAMIDOL 612 MG/ML
100 INJECTION, SOLUTION INTRAVASCULAR ONCE
Status: COMPLETED | OUTPATIENT
Start: 2021-05-24 | End: 2021-05-24

## 2021-05-24 RX ORDER — FENTANYL CITRATE 50 UG/ML
25-50 INJECTION, SOLUTION INTRAMUSCULAR; INTRAVENOUS EVERY 5 MIN PRN
Status: DISCONTINUED | OUTPATIENT
Start: 2021-05-24 | End: 2021-05-24

## 2021-05-24 RX ORDER — HEPARIN SODIUM 200 [USP'U]/100ML
1 INJECTION, SOLUTION INTRAVENOUS CONTINUOUS PRN
Status: DISCONTINUED | OUTPATIENT
Start: 2021-05-24 | End: 2021-05-24

## 2021-05-24 RX ORDER — FLUMAZENIL 0.1 MG/ML
0.2 INJECTION, SOLUTION INTRAVENOUS
Status: DISCONTINUED | OUTPATIENT
Start: 2021-05-24 | End: 2021-05-24

## 2021-05-24 RX ADMIN — LIDOCAINE HYDROCHLORIDE 6 ML: 10 INJECTION, SOLUTION INFILTRATION; PERINEURAL at 14:18

## 2021-05-24 RX ADMIN — METOPROLOL TARTRATE 12.5 MG: 25 TABLET, FILM COATED ORAL at 20:36

## 2021-05-24 RX ADMIN — PIPERACILLIN SODIUM AND TAZOBACTAM SODIUM 4.5 G: 4; .5 INJECTION, POWDER, LYOPHILIZED, FOR SOLUTION INTRAVENOUS at 05:09

## 2021-05-24 RX ADMIN — ATORVASTATIN CALCIUM 10 MG: 10 TABLET, FILM COATED ORAL at 09:22

## 2021-05-24 RX ADMIN — PAROXETINE HYDROCHLORIDE 20 MG: 20 TABLET, FILM COATED ORAL at 09:22

## 2021-05-24 RX ADMIN — PIPERACILLIN SODIUM AND TAZOBACTAM SODIUM 4.5 G: 4; .5 INJECTION, POWDER, LYOPHILIZED, FOR SOLUTION INTRAVENOUS at 10:38

## 2021-05-24 RX ADMIN — PANTOPRAZOLE SODIUM 40 MG: 40 INJECTION, POWDER, FOR SOLUTION INTRAVENOUS at 20:36

## 2021-05-24 RX ADMIN — Medication 500 MCG: at 09:22

## 2021-05-24 RX ADMIN — PANTOPRAZOLE SODIUM 40 MG: 40 INJECTION, POWDER, FOR SOLUTION INTRAVENOUS at 09:22

## 2021-05-24 RX ADMIN — METOPROLOL TARTRATE 12.5 MG: 25 TABLET, FILM COATED ORAL at 09:22

## 2021-05-24 RX ADMIN — FENTANYL CITRATE 50 MCG: 50 INJECTION, SOLUTION INTRAMUSCULAR; INTRAVENOUS at 14:10

## 2021-05-24 RX ADMIN — IPRATROPIUM BROMIDE AND ALBUTEROL 1 PUFF: 20; 100 SPRAY, METERED RESPIRATORY (INHALATION) at 09:28

## 2021-05-24 RX ADMIN — ALLOPURINOL 300 MG: 300 TABLET ORAL at 09:22

## 2021-05-24 RX ADMIN — HEPARIN SODIUM 1 BAG: 200 INJECTION, SOLUTION INTRAVENOUS at 14:08

## 2021-05-24 RX ADMIN — PIPERACILLIN SODIUM AND TAZOBACTAM SODIUM 4.5 G: 4; .5 INJECTION, POWDER, LYOPHILIZED, FOR SOLUTION INTRAVENOUS at 19:16

## 2021-05-24 RX ADMIN — MIDAZOLAM HYDROCHLORIDE 1 MG: 1 INJECTION, SOLUTION INTRAMUSCULAR; INTRAVENOUS at 14:11

## 2021-05-24 RX ADMIN — IOPAMIDOL 20 ML: 612 INJECTION, SOLUTION INTRAVENOUS at 14:28

## 2021-05-24 ASSESSMENT — ACTIVITIES OF DAILY LIVING (ADL)
ADLS_ACUITY_SCORE: 24

## 2021-05-24 NOTE — CONSULTS
"[facility]  Gastroenterology Consultation         Ron Gilmore  [address]   [place] [address]  80 year old male    Admission Date/Time: 5/18/2021  Primary Care Provider: Augustin Thomas  Referring / Attending Physician:  Dr. Steve Hernandez    We were asked to see the patient in consultation by Dr. Steve Hernandez for evaluation of probable upper GI bleed.      CC: fever and respiratory failure    HPI:  Ron Gilmore is a 80 year old male who has complete PMHx of CVA with residual left-sided weakness, COPD, chronic urinary retention with chronic indwelling Cardona catheter, history of septic shock secondary to E. coli bacteremia due to an obstructive lumbosacral UV junction stone with hydronephrosis status post right-sided percutaneous tube placement and removal and right-sided stent placement, diabetes mellitus type 2, dysphagia and recent COVID-19 infection with admission to [facility] from 05/10 to 05/17 with acute diastolic congestive heart failure, hypoxic respiratory failure, and possible pneumonia.  He is re admitted on 5/18/2021 with fever and possible worsening of oxygenation with slow drop in hemoglobin and reported melena.    Patient has no h/o PUD, dysphagia, choking until recent hospitalization for COVID. He developed loss of taste, difficulty with swallowing and pain with swallowing. He has had dark melenotic stools. Denies diarrhea, abdominal pain, chest pain, headache or heartburn. He states \"you can't make me better, my lungs won't get better, no one can help me\".    He has h/o COPD, recent COVID 19 infection and on 13 LPM nasal cannula. BP  systolic.     Labs noted hemoglobin drifting down from baseline of 10-11 to 7.2. Sodium quite elevated at 150. Creatinine 1.36 and baseline 1.1-1.2.    ROS: A comprehensive ten point review of systems was negative aside from those in mentioned in the HPI.      PAST MED HX:  I have reviewed this " patient's medical history and updated it with pertinent information if needed.   Past Medical History:   Diagnosis Date     BPH (benign prostatic hyperplasia)      Cataract      Cholelithiasis      COPD (chronic obstructive pulmonary disease) (H)      Depression      Diabetes mellitus     Type 2     Dyshidrotic foot dermatitis      Edema      Gout      Hyperlipidemia      Hypertension      Kidney stones     Bladder Stones     Lumbago      Lumbar disc displacement without myelopathy      Muscle weakness      Neuropathy, diabetic (H)      Obesity      Spinal stenosis      Stroke (H)     with residual effects- weakness LUE> LLE     Unsteady gait      Urinary retention with incomplete bladder emptying      UTI (urinary tract infection)        MEDICATIONS:   Prior to Admission Medications   Prescriptions Last Dose Informant Patient Reported? Taking?   Emollient (AMLACTIN ULTRA EX) prn Nursing Home Yes No   Sig: Apply topically daily as needed TO FEET   PARoxetine (PAXIL) 20 MG tablet 5/18/2021 at am Nursing Home Yes Yes   Sig: Take 20 mg by mouth daily   acetaminophen (TYLENOL) 325 MG tablet 5/10/2021 at 1632 Nursing Home Yes No   Sig: Take 650 mg by mouth every 4 hours as needed for mild pain as needed for pain/fever Max dose 3000mg/24hr   allopurinol (ZYLOPRIM) 300 MG tablet 5/18/2021 at am Nursing Home Yes Yes   Sig: Take 300 mg by mouth daily   aspirin (ASA) 325 MG EC tablet 5/18/2021 at am Nursing Home No Yes   Sig: Take 1 tablet (325 mg) by mouth daily   atorvastatin (LIPITOR) 10 MG tablet 5/18/2021 at am Nursing Home Yes Yes   Sig: Take 10 mg by mouth daily   cyanocobalamin (VITAMIN B-12) 500 MCG SUBL sublingual tablet 5/18/2021 at am Nursing Home No Yes   Sig: Place 1 tablet (500 mcg) under the tongue daily   furosemide (LASIX) 40 MG tablet 5/18/2021 at am Nursing Home No Yes   Sig: Take 1 tablet (40 mg) by mouth daily   ipratropium-albuterol (COMBIVENT RESPIMAT)  MCG/ACT inhaler 5/18/2021 at [address] [facility]  Home Yes Yes   Sig: Inhale 1 puff into the lungs 4 times daily 0800, 1200 ,1600 ,2000   metoprolol tartrate (LOPRESSOR) 25 MG tablet 5/18/2021 at am Nursing Home No Yes   Sig: Take 0.5 tablets (12.5 mg) by mouth 2 times daily   polyethylene glycol (MIRALAX) 17 GM/Dose powder 5/18/2021 at am Nursing Home No Yes   Sig: Take 17 g by mouth daily      Facility-Administered Medications: None       ALLERGIES: No Known Allergies    SOCIAL HISTORY:  Social History     Tobacco Use     Smoking status: Former Smoker     Smokeless tobacco: Never Used   Substance Use Topics     Alcohol use: No     Comment: none for 29 yrs     Drug use: No       FAMILY HISTORY:  Family History   Problem Relation Age of Onset     Prostate Cancer Father        PHYSICAL EXAM:   General  Alert, oriented and comfortable  Vital Signs with Ranges  Temp: 97.4  F (36.3  C) Temp src: Oral BP: 115/82 Pulse: 75   Resp: 21 SpO2: 92 % O2 Device: Oxymizer cannula Oxygen Delivery: 13 LPM  I/O last 3 completed shifts:  In: -   Out: 1200 [Urine:1200]    Constitutional: alert, moderate distress, cooperative and pale   Cardiovascular: negative, PMI normal. No lifts, heaves, or thrills. RRR. No murmurs, clicks gallops or rub  Respiratory: negative, Percussion normal. Good diaphragmatic excursion. Lungs clear  Head: Normocephalic. No masses, lesions, tenderness or abnormalities  Neck: Neck supple. No adenopathy. Thyroid symmetric, normal size,, Carotids without bruits.  Abdomen: Abdomen soft, non-tender. BS normal. No masses, organomegaly          ADDITIONAL COMMENTS:   I reviewed the patient's new clinical lab test results.   Recent Labs   Lab Test 05/24/21  0451 05/23/21  2141 05/23/21  1436 05/23/21  0745 05/22/21  0611 05/21/21  1047 03/12/21  1128 03/12/21  1128 03/10/21  1420 03/10/21  1420 01/14/20  2356 01/14/20  2356   WBC  --   --   --  5.7 6.1 6.9   < > 14.1*   < >  --    < > 8.7   HGB 7.2* 7.6* 8.1* 7.8* 8.1* 8.5*   < > 11.5*   < >  --    < > 13.4   MCV  --    --   --  92 91 90   < > 92   < >  --    < > 93   PLT  --   --   --  212 166 173   < > 21*   < >  --    < > 184   INR  --   --   --   --   --   --   --  1.20*  --  1.58*  --  1.13    < > = values in this interval not displayed.     Recent Labs   Lab Test 05/24/21  0451 05/23/21  0745 05/22/21  0611   POTASSIUM 3.7 3.4 3.9   CHLORIDE 112* 107 104   CO2 36* 37* 34*   BUN 48* 46* 38*   ANIONGAP 2* 3 5     Recent Labs   Lab Test 05/18/21  2137 05/11/21  0117 05/10/21  2110 05/01/21  1715 05/01/21  1558 03/10/21  0621   ALBUMIN  --   --  2.0*  --  2.5* 1.7*   BILITOTAL  --   --  0.5  --  0.3 1.2   ALT  --   --  37  --  24 25   AST  --   --  35  --  29 51*   PROTEIN 10* 30*  --  50*  --   --        I reviewed the patient's new imaging results.        CONSULTATION ASSESSMENT AND PLAN:    Ron Gilmore is a 80 year old male with respirator failure due to COPD and COVID 19 amongst other problems that is noted to have melena and anemia. GI consulted with concerns for GI bleed.    Active Problems:    Acute and chronic respiratory failure with hypoxia (H)    Anemia    Melena  Patient has had dark stools and hemoglobin decreased from 11->7.2. He has significant oxygen demands. He has multiple complaints with eating, including lack of taste, difficulty and pain. He likely has had some reflux being bed bound and lack of taste due to COVID.    Anemia likely due to upper GI bleed from PUD vs neoplastic cause vs other   An EGD would help determine cause of symptoms and possible source of blood loss. However his respiratory status is poor and if EGD done would ultimately require intubation.     -- no plans for endoscopy at this time would benefit once respiratory status improves  -- check serial hemoglobin and transfuse with hemoglobin of 7 or less  -- IV pantoprazole 40 mg BID  -- NO need to remain NPO may be restart prior diet        [facility].  Office: [phone]  Cell : [phone]  (Dr. Davis)  Cell: [phone] (Ramya Evangelista PA-C)

## 2021-05-24 NOTE — PROCEDURES
[facility]    Procedure: IVC Filter    Date/Time: 5/24/2021 2:44 PM  Performed by: Romeo Szymanski  Authorized by: Romeo Szymanski     UNIVERSAL PROTOCOL   Site Marked: NA  Prior Images Obtained and Reviewed:  Yes  Required items: Required blood products, implants, devices and special equipment available    Patient identity confirmed:  Verbally with patient, arm band, provided demographic data and hospital-assigned identification number  Patient was reevaluated immediately before administering moderate or deep sedation or anesthesia  Confirmation Checklist:  Patient's identity using two indicators, relevant allergies, procedure was appropriate and matched the consent or emergent situation and correct equipment/implants were available  Time out: Immediately prior to the procedure a time out was called    Universal Protocol: the Joint Commission Universal Protocol was followed    Preparation: Patient was prepped and draped in usual sterile fashion           ANESTHESIA    Anesthesia: See MAR for details  Local Anesthetic:  Lidocaine 1% without epinephrine      SEDATION    Patient Sedated: Yes    Sedation Type:  Moderate (conscious) sedation  Vital signs: Vital signs monitored during sedation    See dictated procedure note for full details.  Findings: Lo IVC Filter    Specimens: none    Complications: None    Condition: Stable    PROCEDURE   Patient Tolerance:  Patient tolerated the procedure well with no immediate complications    Length of time physician/provider present for 1:1 monitoring during sedation: 10

## 2021-05-24 NOTE — PRE-PROCEDURE
GENERAL PRE-PROCEDURE:   Procedure:  Venogram, Temporary inferior vena cava filter placement with intravenous moderate sedation   Date/Time:  5/24/2021 12:00 PM    Written consent obtained?: Yes    Risks and benefits: Risks, benefits and alternatives were discussed    Consent given by:  Patient  Patient states understanding of procedure being performed: Yes    Patient's understanding of procedure matches consent: Yes    Procedure consent matches procedure scheduled: Yes    Expected level of sedation:  Moderate  Appropriately NPO:  Yes  ASA Class:  Class 3- Severe systemic disease, definite functional limitations  Mallampati  :  Grade 3- soft palate visible, posterior pharyngeal wall not visible  Lungs:  Lungs clear with good breath sounds bilaterally  Heart:  Normal heart sounds and rate  History & Physical reviewed:  History and physical reviewed and no updates needed  Statement of review:  I have reviewed the lab findings, diagnostic data, medications, and the plan for sedation    Patient is on IR schedule today Monday 5/24/21 for the above procedure.     -Labs WNL for procedure.    -Orders for NPO have been entered. He may have ice chips.   -Consent was obtained from patient Ron after explaining the procedure, risks and benefits and consent is in IR.     [place] contact the IR department at [phone] for procedural related questions.     Discussed with Amara BLACK today.    Thanks Lisa Leitschuh CNP Interventional Radiology ([phone])

## 2021-05-24 NOTE — PROGRESS NOTES
[facility]    Medicine Progress Note - Hospitalist Service       Date of Admission:  5/18/2021  Assessment & Plan       Ron Gilmore is a 80 year old male admitted on 5/18/2021.  Complex past medical history of CVA with residual left-sided weakness, COPD, chronic urinary retention with chronic indwelling Cardona catheter, history of septic shock secondary to E. 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  Also history of dyslipidemia, depression, diabetes mellitus type 2 diet-controlled and dysphagia and recent COVID-19 infection and recent admission to [facility] from 05/10 to 05/17 with acute diastolic congestive heart failure, hypoxic respiratory failure, possible pneumonia.  He is re admitted on 5/18/2021 with fever and possible worsening of oxygenation.       Hypoxic acute respiratory failure  SIRS  Suspected pulmonary embolism   Pulmonary infiltrates   Chronic obstructive lung disease/emphysema  Sleep disordered breathing   Recent COVID-19 infection   Right peroneal vein DVT  CT negative for pulmonary embolism in main arteries but segmental or smaller not ruled out due to motion artifact.  D-dimer 1.7.  Lungs showed emphysema and patchy lower lobe infiltrates but small.  No pulmonary edema.  Pulmonary embolism is still a concern especially with the right calf deep venous thrombosis. CT does show emphysema and bilateral lower lobe infiltrates.  He was initially febrile but after starting antibiotics, he has no more fever.  No leukocytosis and pro-calcitonin is undetectable.  * weaned from HFNC 5/22    lovenox held 5/23 due to probable GIB; will place IVC filter today - notified IR of recent lovenox dosing    Continue intravenous Zosyn (day 6/7)    Wean oxygen as able    Holding lasix with bump in Cr    Continue inhaled  bronchodilators     Aggressive pulm toilet with IS and flutter valve    Now >20 days from initial positive COVID test, discussed with infection prevention and will discontinue isolation    Acute on chronic anemia due to probable upper GIB  Hgb 3/2021 wnl but fell to about 10 g/dL following hospitalization that month and stable at admission, but trended down over several days with RN noting melanic stool 5/23.  Hx colon polyps on colonoscopy in 2006, no prior hx GIB.     - hgb 7.2 this AM; monitor q8h with conditional transfusion for hgb <7 d/gL  - continue PPI IV bid (initiated 5/23)  - hold aspirin and lovenox  - discussed with GI, will hold on EGD as hemodynamically stable and would require intubation     Chronic indwelling urinary catheter  Urine culture growing Candida, as currently afebrile with negative blood cultures will not treat.     Continue catheter      Hypertension   Intermittent atrial fibrillation not on anticoagulation   Chronic diastolic congestive heart failure   Appears compensated.  BNP down from 4500 at recent discharge to 800.  No pulmonary edema on CT scan.  Was not previously on anticoagulation for A-fib.     Hold aspirin and lasix as above      Type 2 diabetes mellitus     Stable, continue dietary control      Depression     Continue paroxetine      Stroke history   He was not on anticoagulation for atrial fibrillation but is currently being anticoagulated for the deep venous thrombosis.  - Continue statin; aspirin held    Non-severe malnutrition  Has had <50% oral intake for >1 month with mild subcutaneous fat and muscle loss on exam.   - nutrition following         Diet: Snacks/Supplements Adult: Gelatein Plus; Between Meals  Moderate Consistent CHO Diet    DVT Prophylaxis: PCD  Cardona Catheter: in place, indication: Retention  Code Status: Full Code           Disposition Plan   Expected discharge: 4 - 7 days, recommended to transitional care unit once O2 use less than 3  "liters/minute.  Entered: Steve Hernandez MD 05/24/2021, 8:40 AM       The patient's care was discussed with the Bedside Nurse, Patient and Patient's Family.    Steve Hernandez MD  Hospitalist Service  [facility]  Contact information available via [facility] Paging/Directory    ______________________________________________________________________    Interval History   Very frustrated reporting he is \"going to die in here\" and wants to \"go home to die,\" feeling that he isn't getting better.  Upset that he can't eat/drink and is very thirsty, feels all we are doing for him is IS.  After long discussion reviewing his medical problems and what we are doing to address them, he states that he is just very frustrated with his prolonged hospitalizations and having been unable to see any family and ultimately apologizes for \"unloading.\"  He is agreeable to proceeding with IVC filter placement and ongoing medical treatment.     Data reviewed today: I reviewed all medications, new labs and imaging results over the last 24 hours. I personally reviewed no images or EKG's today.    Physical Exam   Vital Signs: Temp: 97.4  F (36.3  C) Temp src: Oral BP: 119/75 Pulse: 89   Resp: 14 SpO2: 97 % O2 Device: Oxymizer cannula Oxygen Delivery: 13 LPM  Weight: 211 lbs 10.27 oz     General Appearance: Elderly male in NAD, lying in bed  Respiratory: mild bibasilar crackles, lung sounds otherwise diminished throughout, no wheezes or tachypnea  Cardiovascular: RRR, normal s1/s2 without murmur  GI: abdomen soft, normal bowel sounds, nontender, nondistended  Skin: trace ankle edema  Other: Alert and appropriate, cranial nerves grossly intact     Data   Recent Labs   Lab 05/24/21  0451 05/23/21  2141 05/23/21  1436 05/23/21  0745 05/22/21  0611 05/21/21  1047 05/18/21  1938 05/18/21 1938   WBC  --   --   --  5.7 6.1 6.9   < > 8.5   HGB 7.2* 7.6* 8.1* 7.8* 8.1* 8.5*   < > 10.5*   MCV  --   --   --  92 91 90   < > 88   PLT  --   " --   --  212 166 173   < > 286   *  --   --  147* 143 139   < > 143   POTASSIUM 3.7  --   --  3.4 3.9 5.2   < > 3.4   CHLORIDE 112*  --   --  107 104 102   < > 105   CO2 36*  --   --  37* 34* 35*   < > 34*   BUN 48*  --   --  46* 38* 36*   < > 24   CR 1.36*  --   --  1.32* 1.26* 1.18   < > 1.17   ANIONGAP 2*  --   --  3 5 2*   < > 4   RAJ 8.7  --   --  8.7 8.6 8.9   < > 8.8   *  --   --  115* 85 114*   < > 147*   TROPI  --   --   --   --   --   --   --  <0.015    < > = values in this interval not displayed.

## 2021-05-24 NOTE — PLAN OF CARE
[facility]. Recovered covid, on special precautions. A&Ox4. VSS, tachy at times. 12-13L oxymizer cannula. Assist of 2, turn/repo, refuses at times. NPO. Lung sounds diminished, ROSS. Infrequent, productive cough. Bowel sounds active, large black BMx3, incontinent. L sided weakness from previous CVA. Adequate urine output via chirinos. Mepilex on coccyx, dusky. Denies pain. Hgb checks 7.6, 7.2.

## 2021-05-25 ENCOUNTER — APPOINTMENT (OUTPATIENT)
Dept: SPEECH THERAPY | Facility: CLINIC | Age: 81
DRG: 871 | End: 2021-05-25
Payer: COMMERCIAL

## 2021-05-25 LAB
ANION GAP SERPL CALCULATED.3IONS-SCNC: 3 MMOL/L (ref 3–14)
BACTERIA SPEC CULT: NO GROWTH
BACTERIA SPEC CULT: NO GROWTH
BUN SERPL-MCNC: 40 MG/DL (ref 7–30)
CALCIUM SERPL-MCNC: 9 MG/DL (ref 8.5–10.1)
CHLORIDE SERPL-SCNC: 114 MMOL/L (ref 94–109)
CO2 SERPL-SCNC: 36 MMOL/L (ref 20–32)
CREAT SERPL-MCNC: 1.46 MG/DL (ref 0.66–1.25)
ERYTHROCYTE [DISTWIDTH] IN BLOOD BY AUTOMATED COUNT: 18.2 % (ref 10–15)
GFR SERPL CREATININE-BSD FRML MDRD: 45 ML/MIN/{1.73_M2}
GLUCOSE BLDC GLUCOMTR-MCNC: 97 MG/DL (ref 70–99)
GLUCOSE SERPL-MCNC: 115 MG/DL (ref 70–99)
HCT VFR BLD AUTO: 26.7 % (ref 40–53)
HGB BLD-MCNC: 7.7 G/DL (ref 13.3–17.7)
HGB BLD-MCNC: 7.8 G/DL (ref 13.3–17.7)
HGB BLD-MCNC: 8.1 G/DL (ref 13.3–17.7)
MCH RBC QN AUTO: 28 PG (ref 26.5–33)
MCHC RBC AUTO-ENTMCNC: 29.2 G/DL (ref 31.5–36.5)
MCV RBC AUTO: 96 FL (ref 78–100)
PLATELET # BLD AUTO: 269 10E9/L (ref 150–450)
POTASSIUM SERPL-SCNC: 3.5 MMOL/L (ref 3.4–5.3)
RBC # BLD AUTO: 2.79 10E12/L (ref 4.4–5.9)
SODIUM SERPL-SCNC: 153 MMOL/L (ref 133–144)
SPECIMEN SOURCE: NORMAL
SPECIMEN SOURCE: NORMAL
WBC # BLD AUTO: 7.9 10E9/L (ref 4–11)

## 2021-05-25 PROCEDURE — 250N000011 HC RX IP 250 OP 636: Performed by: INTERNAL MEDICINE

## 2021-05-25 PROCEDURE — 999N001017 HC STATISTIC GLUCOSE BY METER IP

## 2021-05-25 PROCEDURE — 250N000013 HC RX MED GY IP 250 OP 250 PS 637: Performed by: HOSPITALIST

## 2021-05-25 PROCEDURE — 36415 COLL VENOUS BLD VENIPUNCTURE: CPT | Performed by: HOSPITALIST

## 2021-05-25 PROCEDURE — 85027 COMPLETE CBC AUTOMATED: CPT | Performed by: HOSPITALIST

## 2021-05-25 PROCEDURE — 250N000011 HC RX IP 250 OP 636: Performed by: HOSPITALIST

## 2021-05-25 PROCEDURE — C9113 INJ PANTOPRAZOLE SODIUM, VIA: HCPCS | Performed by: HOSPITALIST

## 2021-05-25 PROCEDURE — 99233 SBSQ HOSP IP/OBS HIGH 50: CPT | Performed by: HOSPITALIST

## 2021-05-25 PROCEDURE — 85018 HEMOGLOBIN: CPT | Performed by: HOSPITALIST

## 2021-05-25 PROCEDURE — 258N000002 HC RX IP 258 OP 250: Performed by: HOSPITALIST

## 2021-05-25 PROCEDURE — 120N000013 HC R&B IMCU

## 2021-05-25 PROCEDURE — 250N000013 HC RX MED GY IP 250 OP 250 PS 637: Performed by: PHYSICIAN ASSISTANT

## 2021-05-25 PROCEDURE — 80048 BASIC METABOLIC PNL TOTAL CA: CPT | Performed by: HOSPITALIST

## 2021-05-25 PROCEDURE — 92526 ORAL FUNCTION THERAPY: CPT | Mod: GN | Performed by: SPEECH-LANGUAGE PATHOLOGIST

## 2021-05-25 PROCEDURE — 250N000013 HC RX MED GY IP 250 OP 250 PS 637: Performed by: INTERNAL MEDICINE

## 2021-05-25 RX ORDER — PANTOPRAZOLE SODIUM 40 MG/1
40 TABLET, DELAYED RELEASE ORAL
Status: DISCONTINUED | OUTPATIENT
Start: 2021-05-25 | End: 2021-06-03 | Stop reason: HOSPADM

## 2021-05-25 RX ORDER — MULTIPLE VITAMINS W/ MINERALS TAB 9MG-400MCG
1 TAB ORAL DAILY
Status: DISCONTINUED | OUTPATIENT
Start: 2021-05-25 | End: 2021-06-03 | Stop reason: HOSPADM

## 2021-05-25 RX ORDER — SODIUM CHLORIDE 450 MG/100ML
INJECTION, SOLUTION INTRAVENOUS CONTINUOUS
Status: ACTIVE | OUTPATIENT
Start: 2021-05-25 | End: 2021-05-25

## 2021-05-25 RX ADMIN — METOPROLOL TARTRATE 12.5 MG: 25 TABLET, FILM COATED ORAL at 08:21

## 2021-05-25 RX ADMIN — Medication 500 MCG: at 08:20

## 2021-05-25 RX ADMIN — PIPERACILLIN SODIUM AND TAZOBACTAM SODIUM 4.5 G: 4; .5 INJECTION, POWDER, LYOPHILIZED, FOR SOLUTION INTRAVENOUS at 08:33

## 2021-05-25 RX ADMIN — ALLOPURINOL 300 MG: 300 TABLET ORAL at 08:20

## 2021-05-25 RX ADMIN — PANTOPRAZOLE SODIUM 40 MG: 40 INJECTION, POWDER, FOR SOLUTION INTRAVENOUS at 08:27

## 2021-05-25 RX ADMIN — MULTIPLE VITAMINS W/ MINERALS TAB 1 TABLET: TAB at 11:50

## 2021-05-25 RX ADMIN — PIPERACILLIN SODIUM AND TAZOBACTAM SODIUM 4.5 G: 4; .5 INJECTION, POWDER, LYOPHILIZED, FOR SOLUTION INTRAVENOUS at 01:53

## 2021-05-25 RX ADMIN — PIPERACILLIN SODIUM AND TAZOBACTAM SODIUM 4.5 G: 4; .5 INJECTION, POWDER, LYOPHILIZED, FOR SOLUTION INTRAVENOUS at 19:43

## 2021-05-25 RX ADMIN — IPRATROPIUM BROMIDE AND ALBUTEROL 1 PUFF: 20; 100 SPRAY, METERED RESPIRATORY (INHALATION) at 08:28

## 2021-05-25 RX ADMIN — PANTOPRAZOLE SODIUM 40 MG: 40 TABLET, DELAYED RELEASE ORAL at 18:47

## 2021-05-25 RX ADMIN — IPRATROPIUM BROMIDE AND ALBUTEROL 1 PUFF: 20; 100 SPRAY, METERED RESPIRATORY (INHALATION) at 22:36

## 2021-05-25 RX ADMIN — SODIUM CHLORIDE: 4.5 INJECTION, SOLUTION INTRAVENOUS at 11:46

## 2021-05-25 RX ADMIN — PIPERACILLIN SODIUM AND TAZOBACTAM SODIUM 4.5 G: 4; .5 INJECTION, POWDER, LYOPHILIZED, FOR SOLUTION INTRAVENOUS at 13:50

## 2021-05-25 RX ADMIN — ATORVASTATIN CALCIUM 10 MG: 10 TABLET, FILM COATED ORAL at 08:21

## 2021-05-25 RX ADMIN — IPRATROPIUM BROMIDE AND ALBUTEROL 1 PUFF: 20; 100 SPRAY, METERED RESPIRATORY (INHALATION) at 13:57

## 2021-05-25 RX ADMIN — IPRATROPIUM BROMIDE AND ALBUTEROL 1 PUFF: 20; 100 SPRAY, METERED RESPIRATORY (INHALATION) at 18:48

## 2021-05-25 RX ADMIN — METOPROLOL TARTRATE 12.5 MG: 25 TABLET, FILM COATED ORAL at 20:54

## 2021-05-25 RX ADMIN — PAROXETINE HYDROCHLORIDE 20 MG: 20 TABLET, FILM COATED ORAL at 08:21

## 2021-05-25 ASSESSMENT — ACTIVITIES OF DAILY LIVING (ADL)
ADLS_ACUITY_SCORE: 28
ADLS_ACUITY_SCORE: 24
ADLS_ACUITY_SCORE: 28
ADLS_ACUITY_SCORE: 24

## 2021-05-25 NOTE — PLAN OF CARE
[id number]: [facility] [place] A&O. VSS on 8L oxymizer or oxymask when sleeping. Lung sounds coarse in the bases bilaterally, occasional loose cough breaths shallow, refusing IS. Incontinent of stool, several loose bloody BM. Adequate output to chirinos. Groin site WNL. Denies pain or nausea. Blood transfusion completed, hgb recheck 7.8. Turned and pericares q2h and prn. Baseline L side weakness.

## 2021-05-25 NOTE — PLAN OF CARE
[id number]: [facility] [place]. POD 0 IVC filter placement by IR. Covid-19 precautions discontinued today, contact precautions remain in place for VRE. A/O x4. VSS on 8L via oxymizer NC, tachy at times. Assist of 2x w/ lift. T/R Q 2hrs as allowed. Hx of CVA, L sided residual weakness. DD2 nectar thick, 2g Na diet. Lung sounds dim and coarse, ROSS, IS use encouraged however pt strongly dislikes when anyone even mentions the IS. Nonproductive infrequent cough. Bowel sounds active. Passing flatus. Incontinent of several large watery black stools - MD aware. Adequate urine output via chirinso catheter. Mepilex to coccyx. IR groin site WDL. Denies pain. Denies nausea. Tele: . New PIV placed by IV team today. SLP will see pt tomorrow. Hgb: 6.6, 1 unit of blood transfusion started.

## 2021-05-25 NOTE — PROGRESS NOTES
JEROME  D: Informed by Mamta in admissions at [facility] [place], that [place] is from their TCU. Pt has a bedhold. JEROME informed pt should be ready for discharge on Friday.     P: Would continue to follow     TIFFANY Benavidez     [facility]

## 2021-05-25 NOTE — PROGRESS NOTES
[facility]    Medicine Progress Note - Hospitalist Service       Date of Admission:  5/18/2021  Assessment & Plan       Ron Gilmore is a 80 year old male admitted on 5/18/2021.  Complex past medical history of CVA with residual left-sided weakness, COPD, chronic urinary retention with chronic indwelling Cardona catheter, history of septic shock secondary to E. 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  Also history of dyslipidemia, depression, diabetes mellitus type 2 diet-controlled and dysphagia and recent COVID-19 infection and recent admission to [facility] from 05/10 to 05/17 with acute diastolic congestive heart failure, hypoxic respiratory failure, possible pneumonia.  He is re admitted on 5/18/2021 with fever and possible worsening of oxygenation.       Hypoxic acute respiratory failure  SIRS  Suspected pulmonary embolism s/p IVC filter 5/24  Pulmonary infiltrates   Chronic obstructive lung disease/emphysema  Sleep disordered breathing   Recent COVID-19 infection/COVID-recovered  Right peroneal vein DVT  CT negative for pulmonary embolism in main arteries but segmental or smaller not ruled out due to motion artifact.  D-dimer 1.7.  Lungs showed emphysema and patchy lower lobe infiltrates but small.  No pulmonary edema.  Pulmonary embolism is still a concern especially with the right calf deep venous thrombosis. CT does show emphysema and bilateral lower lobe infiltrates.  He was initially febrile but after starting antibiotics, he has no more fever.  No leukocytosis and pro-calcitonin is undetectable.  * weaned from HFNC 5/22  * lovenox held 5/23 due to probable GIB and underwent IVC filter placement 5/24    Continue intravenous Zosyn (day 7/7)    Wean oxygen as able; down to 6L oxymizer today    Holding lasix with probable  GIB and AIDA    Resume anticoagulation once safe from GI perspective    Continue inhaled bronchodilators     Aggressive pulm toilet with IS and flutter valve; frequently refusing    Discontinue IMC status 5/25    Acute on chronic anemia due to probable upper GIB  Hgb 3/2021 wnl but fell to about 10 g/dL following hospitalization that month and stable at admission, but trended down over several days with RN noting melanic stool 5/23.  Hx colon polyps on colonoscopy in 2006, no prior hx GIB.     - received 1unit PRBC 5/24 for hgb 6.6 with hgb 7.8 today  - monitor q8h with conditional transfusion for hgb <7 d/gL  - continue PPI IV bid (initiated 5/23)  - hold aspirin and lovenox  - GI following; holding on EGD due to resp status and hemodynamically stable    AIDA  Likely combination of diuresis for resp failure and GIB.  Baseline Cr 0.7.  Admission Cr 1.3 initially improved to 1.0 but subsequently trended up again.  - Cr 1.4 on 5/25; monitor daily  - will give 1L 1/2NS today  - avoid nephrotoxins    Hypernatremia  Due to poor oral intake.   - encourage PO fluids  - IVF as above     Chronic indwelling urinary catheter  Urine culture growing Candida, as currently afebrile with negative blood cultures will not treat.     Continue catheter      Hypertension   Intermittent atrial fibrillation not on anticoagulation   Chronic diastolic congestive heart failure   Appears compensated.  BNP down from 4500 at recent discharge to 800.  No pulmonary edema on CT scan.  Was not previously on anticoagulation for A-fib.     Hold aspirin and lasix as above      Type 2 diabetes mellitus     Stable, continue dietary control      Depression     Continue paroxetine      Stroke history   He was not on anticoagulation for atrial fibrillation but is currently being anticoagulated for the deep venous thrombosis.  Wheelchair bound at baseline.  - Continue statin; aspirin held    Non-severe malnutrition  Has had <50% oral intake for >1 month with  mild subcutaneous fat and muscle loss on exam.   - nutrition following         Diet: Snacks/Supplements Adult: Gelatein Plus; Between Meals  Combination Diet Dysphagia Diet Level 2: Mechan Altered; Nectar Thickened Liquids (pre-thickened or use instant food thickener) (by tsp); 2 gm NA Diet    DVT Prophylaxis: PCD  Cardona Catheter: in place, indication: Retention  Code Status: Full Code           Disposition Plan   Expected discharge: 2 - 3 days, recommended to transitional care unit once O2 use less than 3 liters/minute and AIDA resolved.  Entered: Steve Hernandez MD 05/25/2021, 8:21 AM       The patient's care was discussed with the Bedside Nurse, Patient and Patient's Family.    Steve Hernandez MD  Hospitalist Service  [facility]  Contact information available via [facility] Paging/Directory    ______________________________________________________________________    Interval History   Denies any dyspnea, fever/chills.  No nausea or abdominal pain.  Continues to feel thirsty, trying to take in plenty of fluids.     Data reviewed today: I reviewed all medications, new labs and imaging results over the last 24 hours. I personally reviewed no images or EKG's today.    Physical Exam   Vital Signs: Temp: 97.7  F (36.5  C) Temp src: Oral BP: 124/79 Pulse: 84   Resp: 17 SpO2: 100 % O2 Device: Oxymizer cannula Oxygen Delivery: 8 LPM  Weight: 213 lbs 6.48 oz     General Appearance: Elderly male in NAD, lying in bed  Respiratory: bibasilar crackles somewhat improved, no wheezing or tachypnea  Cardiovascular: RRR, normal s1/s2 without murmur  GI: abdomen soft, normal bowel sounds, nontender, nondistended  Skin: trace ankle edema  Other: Alert and appropriate, cranial nerves grossly intact     Data   Recent Labs   Lab 05/25/21  0459 05/24/21  2137 05/24/21  1601 05/24/21  0451 05/23/21  0745 05/23/21  0745 05/22/21  0611 05/18/21 1938 05/18/21 1938   WBC 7.9  --   --   --   --  5.7 6.1   < > 8.5   HGB 7.8*  6.6* 7.9* 7.2*   < > 7.8* 8.1*   < > 10.5*   MCV 96  --   --   --   --  92 91   < > 88     --   --   --   --  212 166   < > 286   *  --   --  150*  --  147* 143   < > 143   POTASSIUM 3.5  --   --  3.7  --  3.4 3.9   < > 3.4   CHLORIDE 114*  --   --  112*  --  107 104   < > 105   CO2 36*  --   --  36*  --  37* 34*   < > 34*   BUN 40*  --   --  48*  --  46* 38*   < > 24   CR 1.46*  --   --  1.36*  --  1.32* 1.26*   < > 1.17   ANIONGAP 3  --   --  2*  --  3 5   < > 4   RAJ 9.0  --   --  8.7  --  8.7 8.6   < > 8.8   *  --   --  110*  --  115* 85   < > 147*   TROPI  --   --   --   --   --   --   --   --  <0.015    < > = values in this interval not displayed.

## 2021-05-25 NOTE — PROGRESS NOTES
"[facility] Nurse Inpatient Wound Assessment   Reason for consultation: Suspected PI coccyx / Lt buttock    Assessment:  Generally improved from previous hospitalization. Coccyx: NO PI noted. Lt buttock: non-blanchable venous congestion with    small Stage 2 PI in area of scarring      Wound care to bilateral gluteal cleft.   1. Clean wound with saline or MicroKlenz Spray, pat dry  2. Wipe / \"clean\" the surrounding periwound tissue with several skin preps to help with dressing sticking  3. Press a Mepilex  Sacral Dressing ([id number])  to the area, making sure to conform nicely to skin curvatures.   4. Time and date dressing change  PIP:   -REPOSITION ALL PATIENTS SIDE TO SIDE ONLY WHEN IN BED, EVERY 2 HOURS,   -HEELS MUST BE KEPT ELEVATED AT ALL TIMES, USING AT LEAST 2 PILLOWS UNDER EACH CALF, ASSURING HEELS ARE FLOATING  -WHEN UP TO THE CHAIR PT NEEDS TO FULLY OFF LOAD EVERY 2 HOURS  -Continue Pulsate mattress    Orders Written  WO Nurse follow-up plan:weekly  Nursing to notify the Provider(s) and re-consult the WO Nurse if wound(s) deteriorates or new skin concern.    Patient History  According to provider note(s):   Ron Gilmore is a 80 year old male admitted on 5/18/2021.  Complex past medical history of CVA with residual left-sided weakness, COPD, chronic urinary retention with chronic indwelling Cardona catheter, history of septic shock secondary to E. 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  Also history of dyslipidemia, depression, diabetes mellitus type 2 diet-controlled and dysphagia and recent COVID-19 infection and recent admission to [facility] from 05/10 to 05/17 with acute diastolic congestive heart failure, hypoxic respiratory failure, possible pneumonia.  He is re admitted on 5/18/2021 with fever and possible worsening of " oxygenation.    Objective Data  Containment of urine/stool: Cardona for UIC and incontinence protocol for FIC    Active Diet Order  Orders Placed This Encounter      Combination Diet Dysphagia Diet Level 2: Mechan Altered; Nectar Thickened Liquids (pre-thickened or use instant food thickener) (by tsp); 2 gm NA Diet      Output:   I/O last 3 completed shifts:  In: 0   Out: 750 [Urine:750]    Risk Assessment:   Sensory Perception: 3-->slightly limited  Moisture: 3-->occasionally moist  Activity: 1-->bedfast  Mobility: 3-->slightly limited  Nutrition: 3-->adequate  Friction and Shear: 2-->potential problem  Lon Score: 15                          Labs:   Recent Labs   Lab 05/24/21  2137 05/23/21  0745 05/23/21  0745 05/22/21  0611   HGB 6.6*   < > 7.8* 8.1*   WBC  --   --  5.7 6.1   CRP  --   --   --  49.5*    < > = values in this interval not displayed.       Physical Exam  Areas of skin assessed: Coccyx and buttocks   WO photo  Buttocks:  2-9-21                            WO Photo: buttocks 5-24-21            Wound Location:  LEFT BUTTTOCK    Wound History: venous congestion that is blanchable ,   Wound Base: small slit-like wound with pink wound base        Palpation of the wound bed: normal      Drainage: none     Description of drainage: none     LEFT BUTTOCK Measurements (length x width x depth, in cm) 3.0  x 2.0  x  0.0 cm       Periwound skin: venous congestion      Color: brown, dark red chronic discoloratratrion      Temperature: normal   Odor: none  Pain: absent, none    Interventions  Visual inspection and assessment completed hands are new due to vasopressors sacrum on admission   Wound Care Rationale Provide protection   Wound Care: done per plan of care  Supplies: In floor supply room   Current off-loading measures: Pillows under calves and Wedge positioning system  Current support surface:Pulsate air, pt turns with assist x 1  Education provided to: importance of repositioning, plan of care and  Off-loading pressure  Discussed plan of care with patient /Nurse     Kylah Dent RN BSN CWOCN

## 2021-05-25 NOTE — PROGRESS NOTES
[facility]  Gastroenterology Progress Note     Ron Gilmore MRN# [id number]   YOB: 1940 Age: 80 year old          Assessment and Plan:   Ron Gilmore is a 80 year old male with respirator failure due to COPD and COVID 19 amongst other problems that is noted to have melena and anemia. GI consulted with concerns for GI bleed.     Active Problems:    Acute and chronic respiratory failure with hypoxia (H)    Anemia    Melena  Patient has had dark stools and hemoglobin decreased from 11->6.6 and received a unit of PRBC now at 7.8.. He has significant oxygen demands but improving to 8 LPM. He has multiple complaints with eating, including lack of taste, difficulty and pain. He likely has had some reflux being bed bound and lack of taste due to COVID.    Anemia likely due to upper GI bleed from PUD vs neoplastic cause vs other   An EGD would help determine cause of symptoms and possible source of blood loss. However, his respiratory status is poor and if EGD done would ultimately require intubation.      -- no plans for endoscopy at this time would benefit once respiratory status improves  -- check serial hemoglobin and transfuse with hemoglobin of 7 or less  -- IV pantoprazole 40 mg BID              Interval History:   no new complaints, doing well, denies chest pain and doing well; no cp, sob, n/v/d, or abd pain.              Review of Systems:   C: NEGATIVE for fever, chills, change in weight  E/M: NEGATIVE for ear, mouth and throat problems  R: NEGATIVE for significant cough or SOB  CV: NEGATIVE for chest pain, palpitations or peripheral edema             Medications:   I have reviewed this patient's current medications    allopurinol  300 mg Oral Daily     [Held by provider] aspirin  81 mg Oral Daily     atorvastatin  10 mg Oral Daily     cyanocobalamin  500 mcg Sublingual Daily     [Held by provider] enoxaparin ANTICOAGULANT  1 mg/kg Subcutaneous Q12H     [Held by provider]  furosemide  40 mg Oral Daily     ipratropium-albuterol  1 puff Inhalation 4x Daily     metoprolol tartrate  12.5 mg Oral BID     multivitamin w/minerals  1 tablet Oral Daily     pantoprazole (PROTONIX) IV  40 mg Intravenous BID     PARoxetine  20 mg Oral Daily     piperacillin-tazobactam  4.5 g Intravenous Q6H     polyethylene glycol  17 g Oral Daily     sodium chloride (PF)  3 mL Intracatheter Q8H                  Physical Exam:   Vitals were reviewed  Vital Signs with Ranges  Temp:  [97.2  F (36.2  C)-97.9  F (36.6  C)] 97.7  F (36.5  C)  Pulse:  [] 92  Resp:  [9-22] 17  BP: (102-146)/() 132/78  SpO2:  [82 %-100 %] 100 %  I/O last 3 completed shifts:  In: 781.67 [P.O.:480]  Out: 800 [Urine:800]  Constitutional: healthy, alert and no distress   Cardiovascular: negative, PMI normal. No lifts, heaves, or thrills. RRR. No murmurs, clicks gallops or rub  Respiratory: negative, Percussion normal. Good diaphragmatic excursion. Lungs clear  Abdomen: Abdomen soft, non-tender. BS normal. No masses, organomegaly             Data:   I reviewed the patient's new clinical lab test results.   Recent Labs   Lab Test 05/25/21  0459 05/24/21  2137 05/24/21  1601 05/23/21  0745 05/23/21  0745 05/22/21  0611 03/12/21  1128 03/12/21  1128 03/10/21  1420 03/10/21  1420 01/14/20  2356 01/14/20  2356   WBC 7.9  --   --   --  5.7 6.1   < > 14.1*   < >  --    < > 8.7   HGB 7.8* 6.6* 7.9*   < > 7.8* 8.1*   < > 11.5*   < >  --    < > 13.4   MCV 96  --   --   --  92 91   < > 92   < >  --    < > 93     --   --   --  212 166   < > 21*   < >  --    < > 184   INR  --   --   --   --   --   --   --  1.20*  --  1.58*  --  1.13    < > = values in this interval not displayed.     Recent Labs   Lab Test 05/25/21 0459 05/24/21 0451 05/23/21  0745   POTASSIUM 3.5 3.7 3.4   CHLORIDE 114* 112* 107   CO2 36* 36* 37*   BUN 40* 48* 46*   ANIONGAP 3 2* 3     Recent Labs   Lab Test 05/18/21 2137 05/11/21  0117 05/10/21  2110 05/01/21  1715  05/01/21  1558 03/10/21  0621   ALBUMIN  --   --  2.0*  --  2.5* 1.7*   BILITOTAL  --   --  0.5  --  0.3 1.2   ALT  --   --  37  --  24 25   AST  --   --  35  --  29 51*   PROTEIN 10* 30*  --  50*  --   --        I reviewed the patient's new imaging results.    All laboratory data reviewed  All imaging studies reviewed by me.    Ramya Evangelista PA-C,  5/25/2021  [facility]  Office : [phone]  Cell: [phone] (Dr. Davis)  Cell: [phone] (Ramya Evangelista PA-C)

## 2021-05-25 NOTE — PROGRESS NOTES
"CLINICAL NUTRITION SERVICES  -  ASSESSMENT NOTE      Recommendations Ordered by Registered Dietitian (RD): Change Gelatein to Magic Cup between meals; Add MVI-M daily per PI Protocol    Future/Additional Recommendations: If oral intake does not improve, may benefit from a FT   Malnutrition: % Weight Loss:  > 5% in 1 month (severe malnutrition)  % Intake:  </= 50% for >/= 5 days (severe malnutrition)  Subcutaneous Fat Loss:  Upper arm region moderate depletion  Muscle Loss:  Clavicle bone region mild depletion, Acromion bone region mild depletion and Dorsal hand region mild depletion  Fluid Retention:  Mild as above     Malnutrition Diagnosis: Severe malnutrition  In Context of:  Acute illness or injury  Chronic illness or disease        REASON FOR ASSESSMENT  Ron Gilmore is a 80 year old male seen by Registered Dietitian for LOS    Admitted with the following -->  Acute on chronic respiratory failure   Recently discharged after a two week hospitalization for COVID pneumonia   Acute on chronic anemia due to probable upper GIB   AIDA       NUTRITION HISTORY  - Information obtained from patient.  He is also very well know to our services due to multiple past admissions.  Patient states that ever since his COVID diagnosis (5/1/21) he is not able taste any foods and all and his appetite continues to be extremely poor.  Over the last month he has been eating applesauce, pudding, apple juice, Ensure, and Gelatein.    He notes that the COVID has taken so much out of him.  \"I don't even have any muscle left.  I can't even raise my right arm and that's my good arm\".      CURRENT NUTRITION ORDERS  Diet Order:     2000 mg Sodium and Dysphagia Level 2 with Nectar Thick Liquids    Also receiving Gelatein BID between meals     Current Intake/Tolerance:  Patient has not ordered a meal for > 3 days  Last meal ordered was 5/22 (Gelatein) at lunch     Patient states that he is not even taking the Gelatein anymore --> \"I can't even " "swallow it.  I put a spoonful in my mouth and feel like I'm going to throw up\".    Overall, he has been ordering Ensure Enlive (but stopped because he can't tolerate the texture of the thickened version), apple juice, applesauce, and Gelatein.     Inadequate intake since admit (day #8)      NUTRITION FOCUSED PHYSICAL ASSESSMENT FOR DIAGNOSING MALNUTRITION)  Yes             Observed:    Muscle wasting (refer to documentation in Malnutrition section) and Subcutaneous fat loss (refer to documentation in Malnutrition section)    Obtained from Chart/Interdisciplinary Team:  Edema 1-2+ in extremities  Pressure Injury --> 5/24:  WOCN =  Generally improved from previous hospitalization. Coccyx: NO PI noted. Lt buttock: non-blanchable venous congestion with    small Stage 2 PI in area of scarring    ANTHROPOMETRICS  Height: 6'0\"  Weight: 96.8 kg (213#)(5/25)  Body mass index is 28.94 kg/m   Weight Status:  Overweight BMI 25-29.9  IBW: 80.9 kg   % IBW: 120%  Weight History:   Wt Readings from Last 10 Encounters:   05/25/21 96.8 kg (213 lb 6.5 oz)   05/18/21 106 kg (233 lb 11.2 oz)   05/17/21 112 kg (246 lb 14.4 oz)   05/10/21 106 kg (233 lb 11.2 oz)   05/06/21 117.8 kg (259 lb 12.8 oz)   05/03/21 108.9 kg (240 lb 1.3 oz)   04/14/21 109.1 kg (240 lb 8 oz)   03/22/21 107 kg (235 lb 14.4 oz)   02/08/21 122.5 kg (270 lb)   03/12/20 119.6 kg (263 lb 11.2 oz)     Weight has dropped significantly over the last month --> down 27# or 11% over the last 3 weeks - suspecting a lot of the loss is fluid asa patient has been diuresing on Lasix, however he has also likely lost more fat and muscle due to limited intake     LABS  BUN 40 (H), Cr 1.46 (H) - CKD   Na 153 (H) - Has been diuresing on Lasix     MEDICATIONS  Lasix on hold       ASSESSED NUTRITION NEEDS PER APPROVED PRACTICE GUIDELINES:    Dosing Weight 85 kg (adjusted)  Estimated Energy Needs: 6404-1824 kcals (25-30 Kcal/Kg)  Justification: overweight  Estimated Protein Needs: " 100-125 grams protein (1.2-1.5 g pro/Kg)  Justification: hypercatabolism with acute illness  Estimated Fluid Needs: 7887-4381 mL (1 mL/Kcal)  Justification: maintenance    MALNUTRITION:  % Weight Loss:  > 5% in 1 month (severe malnutrition)  % Intake:  </= 50% for >/= 5 days (severe malnutrition)  Subcutaneous Fat Loss:  Upper arm region moderate depletion  Muscle Loss:  Clavicle bone region mild depletion, Acromion bone region mild depletion and Dorsal hand region mild depletion  Fluid Retention:  Mild as above     Malnutrition Diagnosis: Severe malnutrition  In Context of:  Acute illness or injury  Chronic illness or disease    NUTRITION DIAGNOSIS:  Inadequate oral intake related to poor appetite and loss of taste with COVID as evidenced by minimal intake since admit       NUTRITION INTERVENTIONS  Recommendations / Nutrition Prescription  DD2, Nectar Thick Liquids as tolerated  Change Gelatein to Magic Cup between meals   MVI with minerals daily per PI Protocol     If oral intake does not improve, may benefit from a FT    Implementation  Nutrition education: Not appropriate at this time due to patient condition  Medical Food Supplement:  Orders changed as above   Multivitamin/mineral supplement therapy:  Ordered as above     Nutrition Goals  Patient will order at least 2 meals per day (3-4 items per meal) and consume at least 50% meals and supplements     MONITORING AND EVALUATION:  Progress towards goals will be monitored and evaluated per protocol and Practice Guidelines    Nancy Reis RD, LD, CNSC   Clinical Dietitian - [facility]

## 2021-05-26 ENCOUNTER — PATIENT OUTREACH (OUTPATIENT)
Dept: CARE COORDINATION | Facility: CLINIC | Age: 81
End: 2021-05-26

## 2021-05-26 ENCOUNTER — APPOINTMENT (OUTPATIENT)
Dept: SPEECH THERAPY | Facility: CLINIC | Age: 81
DRG: 871 | End: 2021-05-26
Payer: COMMERCIAL

## 2021-05-26 LAB
ANION GAP SERPL CALCULATED.3IONS-SCNC: 3 MMOL/L (ref 3–14)
BUN SERPL-MCNC: 33 MG/DL (ref 7–30)
CALCIUM SERPL-MCNC: 8.9 MG/DL (ref 8.5–10.1)
CHLORIDE SERPL-SCNC: 119 MMOL/L (ref 94–109)
CO2 SERPL-SCNC: 36 MMOL/L (ref 20–32)
CREAT SERPL-MCNC: 1.4 MG/DL (ref 0.66–1.25)
ERYTHROCYTE [DISTWIDTH] IN BLOOD BY AUTOMATED COUNT: 19.9 % (ref 10–15)
GFR SERPL CREATININE-BSD FRML MDRD: 48 ML/MIN/{1.73_M2}
GLUCOSE SERPL-MCNC: 107 MG/DL (ref 70–99)
HCT VFR BLD AUTO: 28.8 % (ref 40–53)
HGB BLD-MCNC: 8 G/DL (ref 13.3–17.7)
MCH RBC QN AUTO: 27.3 PG (ref 26.5–33)
MCHC RBC AUTO-ENTMCNC: 27.8 G/DL (ref 31.5–36.5)
MCV RBC AUTO: 98 FL (ref 78–100)
PLATELET # BLD AUTO: 258 10E9/L (ref 150–450)
POTASSIUM SERPL-SCNC: 3.1 MMOL/L (ref 3.4–5.3)
POTASSIUM SERPL-SCNC: 3.3 MMOL/L (ref 3.4–5.3)
RBC # BLD AUTO: 2.93 10E12/L (ref 4.4–5.9)
SODIUM SERPL-SCNC: 154 MMOL/L (ref 133–144)
SODIUM SERPL-SCNC: 157 MMOL/L (ref 133–144)
SODIUM SERPL-SCNC: 158 MMOL/L (ref 133–144)
WBC # BLD AUTO: 7 10E9/L (ref 4–11)

## 2021-05-26 PROCEDURE — 250N000013 HC RX MED GY IP 250 OP 250 PS 637: Performed by: PHYSICIAN ASSISTANT

## 2021-05-26 PROCEDURE — 250N000013 HC RX MED GY IP 250 OP 250 PS 637: Performed by: INTERNAL MEDICINE

## 2021-05-26 PROCEDURE — 250N000011 HC RX IP 250 OP 636: Performed by: INTERNAL MEDICINE

## 2021-05-26 PROCEDURE — 36415 COLL VENOUS BLD VENIPUNCTURE: CPT | Performed by: HOSPITALIST

## 2021-05-26 PROCEDURE — 84295 ASSAY OF SERUM SODIUM: CPT | Performed by: INTERNAL MEDICINE

## 2021-05-26 PROCEDURE — 258N000003 HC RX IP 258 OP 636: Performed by: INTERNAL MEDICINE

## 2021-05-26 PROCEDURE — 85027 COMPLETE CBC AUTOMATED: CPT | Performed by: HOSPITALIST

## 2021-05-26 PROCEDURE — 120N000013 HC R&B IMCU

## 2021-05-26 PROCEDURE — 80048 BASIC METABOLIC PNL TOTAL CA: CPT | Performed by: HOSPITALIST

## 2021-05-26 PROCEDURE — 84132 ASSAY OF SERUM POTASSIUM: CPT | Performed by: INTERNAL MEDICINE

## 2021-05-26 PROCEDURE — 99233 SBSQ HOSP IP/OBS HIGH 50: CPT | Performed by: INTERNAL MEDICINE

## 2021-05-26 PROCEDURE — 250N000013 HC RX MED GY IP 250 OP 250 PS 637: Performed by: HOSPITALIST

## 2021-05-26 PROCEDURE — 36415 COLL VENOUS BLD VENIPUNCTURE: CPT | Performed by: INTERNAL MEDICINE

## 2021-05-26 PROCEDURE — 92526 ORAL FUNCTION THERAPY: CPT | Mod: GN

## 2021-05-26 RX ORDER — DEXTROSE MONOHYDRATE 50 MG/ML
INJECTION, SOLUTION INTRAVENOUS CONTINUOUS
Status: DISCONTINUED | OUTPATIENT
Start: 2021-05-26 | End: 2021-05-27

## 2021-05-26 RX ORDER — POTASSIUM CHLORIDE 1.5 G/1.58G
40 POWDER, FOR SOLUTION ORAL ONCE
Status: COMPLETED | OUTPATIENT
Start: 2021-05-26 | End: 2021-05-26

## 2021-05-26 RX ORDER — POTASSIUM CHLORIDE 20MEQ/15ML
20 LIQUID (ML) ORAL ONCE
Status: DISCONTINUED | OUTPATIENT
Start: 2021-05-26 | End: 2021-05-26 | Stop reason: ALTCHOICE

## 2021-05-26 RX ADMIN — IPRATROPIUM BROMIDE AND ALBUTEROL 1 PUFF: 20; 100 SPRAY, METERED RESPIRATORY (INHALATION) at 19:08

## 2021-05-26 RX ADMIN — Medication 500 MCG: at 08:59

## 2021-05-26 RX ADMIN — POTASSIUM CHLORIDE 40 MEQ: 1.5 POWDER, FOR SOLUTION ORAL at 11:53

## 2021-05-26 RX ADMIN — DEXTROSE MONOHYDRATE: 50 INJECTION, SOLUTION INTRAVENOUS at 09:55

## 2021-05-26 RX ADMIN — PIPERACILLIN SODIUM AND TAZOBACTAM SODIUM 4.5 G: 4; .5 INJECTION, POWDER, LYOPHILIZED, FOR SOLUTION INTRAVENOUS at 15:19

## 2021-05-26 RX ADMIN — PANTOPRAZOLE SODIUM 40 MG: 40 TABLET, DELAYED RELEASE ORAL at 08:59

## 2021-05-26 RX ADMIN — ALLOPURINOL 300 MG: 300 TABLET ORAL at 08:59

## 2021-05-26 RX ADMIN — METOPROLOL TARTRATE 12.5 MG: 25 TABLET, FILM COATED ORAL at 20:40

## 2021-05-26 RX ADMIN — DEXTROSE MONOHYDRATE: 50 INJECTION, SOLUTION INTRAVENOUS at 20:39

## 2021-05-26 RX ADMIN — IPRATROPIUM BROMIDE AND ALBUTEROL 1 PUFF: 20; 100 SPRAY, METERED RESPIRATORY (INHALATION) at 15:22

## 2021-05-26 RX ADMIN — PIPERACILLIN SODIUM AND TAZOBACTAM SODIUM 4.5 G: 4; .5 INJECTION, POWDER, LYOPHILIZED, FOR SOLUTION INTRAVENOUS at 08:58

## 2021-05-26 RX ADMIN — MULTIPLE VITAMINS W/ MINERALS TAB 1 TABLET: TAB at 08:59

## 2021-05-26 RX ADMIN — PANTOPRAZOLE SODIUM 40 MG: 40 TABLET, DELAYED RELEASE ORAL at 17:07

## 2021-05-26 RX ADMIN — IPRATROPIUM BROMIDE AND ALBUTEROL 1 PUFF: 20; 100 SPRAY, METERED RESPIRATORY (INHALATION) at 21:00

## 2021-05-26 RX ADMIN — POTASSIUM CHLORIDE 40 MEQ: 1.5 POWDER, FOR SOLUTION ORAL at 20:38

## 2021-05-26 RX ADMIN — PAROXETINE HYDROCHLORIDE 20 MG: 20 TABLET, FILM COATED ORAL at 08:59

## 2021-05-26 RX ADMIN — PIPERACILLIN SODIUM AND TAZOBACTAM SODIUM 4.5 G: 4; .5 INJECTION, POWDER, LYOPHILIZED, FOR SOLUTION INTRAVENOUS at 02:56

## 2021-05-26 RX ADMIN — METOPROLOL TARTRATE 12.5 MG: 25 TABLET, FILM COATED ORAL at 08:59

## 2021-05-26 RX ADMIN — IPRATROPIUM BROMIDE AND ALBUTEROL 1 PUFF: 20; 100 SPRAY, METERED RESPIRATORY (INHALATION) at 09:20

## 2021-05-26 RX ADMIN — PIPERACILLIN SODIUM AND TAZOBACTAM SODIUM 4.5 G: 4; .5 INJECTION, POWDER, LYOPHILIZED, FOR SOLUTION INTRAVENOUS at 20:39

## 2021-05-26 RX ADMIN — ATORVASTATIN CALCIUM 10 MG: 10 TABLET, FILM COATED ORAL at 08:59

## 2021-05-26 ASSESSMENT — ACTIVITIES OF DAILY LIVING (ADL)
ADLS_ACUITY_SCORE: 28
ADLS_ACUITY_SCORE: 24
ADLS_ACUITY_SCORE: 24
ADLS_ACUITY_SCORE: 28
ADLS_ACUITY_SCORE: 24
ADLS_ACUITY_SCORE: 24

## 2021-05-26 NOTE — PROGRESS NOTES
[facility]    Medicine Progress Note - Hospitalist Service       Date of Admission:  5/18/2021  Assessment & Plan       Ron Gilmore is a 80 year old male admitted on 5/18/2021.  Complex past medical history of CVA with residual left-sided weakness, COPD, chronic urinary retention with chronic indwelling Cardona catheter, history of septic shock secondary to E. 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  Also history of dyslipidemia, depression, diabetes mellitus type 2 diet-controlled and dysphagia and recent COVID-19 infection and recent admission to [facility] from 05/10 to 05/17 with acute diastolic congestive heart failure, hypoxic respiratory failure, possible pneumonia.  He is re admitted on 5/18/2021 with fever and possible worsening of oxygenation.        Hypoxic acute respiratory failure  SIRS  Suspected pulmonary embolism s/p IVC filter 5/24  Pulmonary infiltrates   Chronic obstructive lung disease/emphysema  Sleep disordered breathing   Recent COVID-19 infection/COVID-recovered  Right peroneal vein DVT  CT negative for pulmonary embolism in main arteries but segmental or smaller not ruled out due to motion artifact.  D-dimer 1.7.  Lungs showed emphysema and patchy lower lobe infiltrates but small.  No pulmonary edema.  Pulmonary embolism is still a concern especially with the right calf deep venous thrombosis. CT does show emphysema and bilateral lower lobe infiltrates.  He was initially febrile but after starting antibiotics, he has no more fever.  No leukocytosis and pro-calcitonin is undetectable.  * weaned from HFNC 5/22  * lovenox held 5/23 due to probable GIB and underwent IVC filter placement 5/24    Continue intravenous Zosyn x 10 days    Wean oxygen as able; down to 5L oxymizer this morning    Holding lasix at  this time-hypernatremia, poor oral intake    Resume anticoagulation once safe from GI perspective    Continue inhaled bronchodilators     Aggressive pulm toilet with IS and flutter valve    Up in a chair       Acute on chronic anemia due to probable upper GIB  Hgb 3/2021 wnl but fell to about 10 g/dL following hospitalization that month and stable at admission, but trended down over several days with RN noting melanic stool 5/23.  Hx colon polyps on colonoscopy in 2006, no prior hx GIB.      - received 1unit PRBC 5/24 for hgb 6.6   - hgb 7.7-8.0 g/dL in the last 24 hrs  - change hgb check to q12 hrs  - continue PPI now PO BID  - hold aspirin and lovenox  - GI following; holding on EGD due to resp status and hemodynamically stable, GI signed off on 5/26 and patient declined EGD     AIDA  Likely combination of diuresis for resp failure and GIB.  Baseline Cr 0.7.  Admission Cr 1.3 initially improved to 1.0 but subsequently trended up again.  - Cr currently stable ~1.4  - has received 0.45NS x1L on 5/25  - avoid nephrotoxins     Hypernatremia  Due to poor oral intake and possible component of IVF  - encourage PO fluids and patient in agreement  - Na 158 today  - start D5W at 100cc/hr, check Na at noon    Addendum--Na recheck 157, continue d5w.    --check Na q6hrs-- goal is not to reduce no more than 6-8mEq in 24 hrs     Hypokalemia  - K replacement protocol, especially with D5W fluid on board    Chronic indwelling urinary catheter  Urine culture growing Candida, as currently afebrile with negative blood cultures will not treat.     Continue catheter      Hypertension   Intermittent atrial fibrillation not on anticoagulation   Chronic diastolic congestive heart failure   Appears compensated.  BNP down from 4500 at recent discharge to 800.  No pulmonary edema on CT scan.  Was not previously on anticoagulation for A-fib.     Hold aspirin and lasix as above      Type 2 diabetes mellitus     Stable, continue dietary  control      Depression     Continue paroxetine      Stroke history   He was not on anticoagulation for atrial fibrillation but is currently being anticoagulated for the deep venous thrombosis.  Wheelchair bound at baseline.  - Continue statin; aspirin held     Severe malnutrition  In the context of acute illness.Chronic illness or disease.   - nutrition following       Diet: Combination Diet Dysphagia Diet Level 2: Mechan Altered; Nectar Thickened Liquids (pre-thickened or use instant food thickener) (by tsp); 2 gm NA Diet  Snacks/Supplements Adult: Magic Cup; Between Meals    DVT Prophylaxis: Pneumatic Compression Devices  Cardona Catheter: in place, indication: Retention  Code Status: Full Code           Disposition Plan   Expected discharge: 2 - 3 days, recommended to transitional care unit once pending ongoing improvement in respiratory status, ability to wean down oxygen, stable hgb and lytes.  Entered: Tien Gimenez MD 05/26/2021, 11:25 AM       The patient's care was discussed with the Bedside Nurse, Patient and Patient's Family.    Tien Gimenez MD  Hospitalist Service  [facility]  Contact information available via [facility] Paging/Directory    ______________________________________________________________________    Interval History   Patient sleeping, easily arouses. States breathing is better. He wants to sit up in a chair. Denies n/v or abdominal pain. Reports poor oral intake.     Data reviewed today: I reviewed all medications, new labs and imaging results over the last 24 hours. I personally reviewed no images or EKG's today.    Physical Exam   Vital Signs: Temp: 97.4  F (36.3  C) Temp src: Oral BP: 114/78 Pulse: 89   Resp: 18 SpO2: 99 % O2 Device: Oxymask Oxygen Delivery: 5 LPM  Weight: 206 lbs 5.61 oz  General Appearance: Sleeping, easily arouses, oriented and appropriate  Respiratory: diminished breath sounds with some crackles at the bases, no  wheezing  Cardiovascular: regular rate and rhythm  GI: soft and non-tender  Skin: warm and dry      Data   Recent Labs   Lab 05/26/21  0637 05/25/21  2242 05/25/21  1459 05/25/21  0459 05/24/21  0451 05/24/21  0451 05/23/21  0745 05/23/21  0745   WBC 7.0  --   --  7.9  --   --   --  5.7   HGB 8.0* 7.7* 8.1* 7.8*   < > 7.2*   < > 7.8*   MCV 98  --   --  96  --   --   --  92     --   --  269  --   --   --  212   *  --   --  153*  --  150*  --  147*   POTASSIUM 3.1*  --   --  3.5  --  3.7  --  3.4   CHLORIDE 119*  --   --  114*  --  112*  --  107   CO2 36*  --   --  36*  --  36*  --  37*   BUN 33*  --   --  40*  --  48*  --  46*   CR 1.40*  --   --  1.46*  --  1.36*  --  1.32*   ANIONGAP 3  --   --  3  --  2*  --  3   RAJ 8.9  --   --  9.0  --  8.7  --  8.7   *  --   --  115*  --  110*  --  115*    < > = values in this interval not displayed.     No results found for this or any previous visit (from the past 24 hour(s)).  Medications     D5W 100 mL/hr at 05/26/21 0955       allopurinol  300 mg Oral Daily     [Held by provider] aspirin  81 mg Oral Daily     atorvastatin  10 mg Oral Daily     cyanocobalamin  500 mcg Sublingual Daily     [Held by provider] enoxaparin ANTICOAGULANT  1 mg/kg Subcutaneous Q12H     [Held by provider] furosemide  40 mg Oral Daily     ipratropium-albuterol  1 puff Inhalation 4x Daily     metoprolol tartrate  12.5 mg Oral BID     multivitamin w/minerals  1 tablet Oral Daily     pantoprazole  40 mg Oral BID AC     PARoxetine  20 mg Oral Daily     piperacillin-tazobactam  4.5 g Intravenous Q6H     polyethylene glycol  17 g Oral Daily     potassium chloride  20 mEq Oral Once     potassium chloride  40 mEq Oral or Feeding Tube Once     sodium chloride (PF)  3 mL Intracatheter Q8H

## 2021-05-26 NOTE — PLAN OF CARE
A&OX4. Able to wean down to 4L O2 NC, satting at 92-94%. Dyspnea with exertion, turning and repositioning frequently, O2 can drop down to mid-80s. Refused IS, but willing to do acapella. Up in chair for several hours this shift with lift. Chronic chirinos patent. Very poor appetite, declined food when offer to order food for patient multiple times. Per pt he does not eat much at home. Pt on DD2, nectar thick diet, endorsed infrequent cough after drinking water. Gluteal dressing change this shift. Continue to monitor.

## 2021-05-26 NOTE — PROGRESS NOTES
[facility]  Gastroenterology Progress Note     Ron Gilmore MRN# [id number]   YOB: 1940 Age: 80 year old          Assessment and Plan:   Ron Gilmore is a 80 year old male with respirator failure due to COPD and COVID 19 amongst other problems that is noted to have melena and anemia. GI consulted with concerns for GI bleed.     Active Problems:    Acute and chronic respiratory failure with hypoxia (H)    Anemia    Melena  Patient has had dark stools and hemoglobin decreased from 11->6.6 and received a unit of PRBC now at 8.0.. He has significant oxygen demands but improving to 5 LPM.   e had multiple complaints with eating, including lack of taste, difficulty and pain. He likely has had some reflux being bed bound and lack of taste due to COVID.  States now these symptoms are resolved.  Anemia likely due to upper GI bleed from PUD vs neoplastic cause vs other   An EGD would help determine cause of symptoms and possible source of blood loss. I discussed with patient that we could plan an endoscopy tomorrow. Patient states he is not interested in an endoscopy and does not want.     -- no plans for endoscopy at this time as patient has declined  -- check serial hemoglobin and transfuse with hemoglobin of 7 or less  -- oral pantoprazole 40 mg BID  -- GI will sign off and chart check as patient does not desire endsocopy       Acute and chronic respiratory failure with hypoxia (H)      Interval History:   no new complaints, doing well, denies chest pain, denies abdominal pain, alert, oriented to person, place and time, has had a bowel movement in the last 24 hours and doing well; no cp, sob, n/v/d, or abd pain.              Review of Systems:   C: NEGATIVE for fever, chills, change in weight  E/M: NEGATIVE for ear, mouth and throat problems  R: NEGATIVE for significant cough or SOB  CV: NEGATIVE for chest pain, palpitations or peripheral edema             Medications:   I have  reviewed this patient's current medications    allopurinol  300 mg Oral Daily     [Held by provider] aspirin  81 mg Oral Daily     atorvastatin  10 mg Oral Daily     cyanocobalamin  500 mcg Sublingual Daily     [Held by provider] enoxaparin ANTICOAGULANT  1 mg/kg Subcutaneous Q12H     [Held by provider] furosemide  40 mg Oral Daily     ipratropium-albuterol  1 puff Inhalation 4x Daily     metoprolol tartrate  12.5 mg Oral BID     multivitamin w/minerals  1 tablet Oral Daily     pantoprazole  40 mg Oral BID AC     PARoxetine  20 mg Oral Daily     piperacillin-tazobactam  4.5 g Intravenous Q6H     polyethylene glycol  17 g Oral Daily     sodium chloride (PF)  3 mL Intracatheter Q8H                  Physical Exam:   Vitals were reviewed  Vital Signs with Ranges  Temp:  [97.4  F (36.3  C)-98  F (36.7  C)] 97.4  F (36.3  C)  Pulse:  [73-89] 89  Resp:  [12-18] 18  BP: (114-149)/(72-88) 114/78  SpO2:  [94 %-100 %] 99 %  I/O last 3 completed shifts:  In: -   Out: 500 [Urine:500]  Constitutional: healthy, alert and no distress   Cardiovascular: negative, PMI normal. No lifts, heaves, or thrills. RRR. No murmurs, clicks gallops or rub  Respiratory: negative, Percussion normal. Good diaphragmatic excursion. Lungs clear  Abdomen: Abdomen soft, non-tender. BS normal. No masses, organomegaly           Data:   I reviewed the patient's new clinical lab test results.   Recent Labs   Lab Test 05/26/21  0637 05/25/21  2242 05/25/21  1459 05/25/21  0459 05/23/21  0745 05/23/21  0745 03/12/21  1128 03/12/21  1128 03/10/21  1420 03/10/21  1420 01/14/20  2356 01/14/20  2356   WBC 7.0  --   --  7.9  --  5.7   < > 14.1*   < >  --    < > 8.7   HGB 8.0* 7.7* 8.1* 7.8*   < > 7.8*   < > 11.5*   < >  --    < > 13.4   MCV 98  --   --  96  --  92   < > 92   < >  --    < > 93     --   --  269  --  212   < > 21*   < >  --    < > 184   INR  --   --   --   --   --   --   --  1.20*  --  1.58*  --  1.13    < > = values in this interval not  displayed.     Recent Labs   Lab Test 05/26/21  0637 05/25/21  0459 05/24/21  0451   POTASSIUM 3.1* 3.5 3.7   CHLORIDE 119* 114* 112*   CO2 36* 36* 36*   BUN 33* 40* 48*   ANIONGAP 3 3 2*     Recent Labs   Lab Test 05/18/21  2137 05/11/21  0117 05/10/21  2110 05/01/21  1715 05/01/21  1558 03/10/21  0621   ALBUMIN  --   --  2.0*  --  2.5* 1.7*   BILITOTAL  --   --  0.5  --  0.3 1.2   ALT  --   --  37  --  24 25   AST  --   --  35  --  29 51*   PROTEIN 10* 30*  --  50*  --   --        I reviewed the patient's new imaging results.    All laboratory data reviewed  All imaging studies reviewed by me.    Ramya Evangelista PA-C,  5/26/2021  [facility]  Office : [phone]  Cell: [phone] (Dr. Davis)  Cell: [phone] (Ramya Evangelista PA-C)

## 2021-05-26 NOTE — PLAN OF CARE
Alert and oriented x4, forgetful at times. Vital signs stable ex. Hypertensive at times on 7-8L O@ via Oxymask. Desats with repo. Assist of 2 with c-lift. Repo Q2. Tolerating diet. Lungs sounds coarse and diminished.BS+. BM+. Cardona in place with adequate UOP. Left sided weakness baseline. Right groin site CDI. Denies pain. Denies nausea.

## 2021-05-26 NOTE — PLAN OF CARE
Pt taken off of IMC. A&O x4. VSS on 6-8LOxymask. Lungs diminished. BS+, BM+, flatus+. Tolerating  Diet. Able to take his medications whole in applesauce. - N/V. Left side weakness. Mepilex on coccyx. IR groin site intact. HGB trending up most recent was 8.1.

## 2021-05-26 NOTE — PLAN OF CARE
Pt taken off of IMC today. A&Ox4, forgetful at times. VSS ex HTN at times on 6-8 L oxymask. Does not c/o any pain or N/V. A2 Q 2 hr turn & repo. Lungs diminished. BS+, BM+, flatus+. Tolerating  Dd2 Diet w/ nectar thickened liquids, takes meds whole in applesauce. Left sided weakness. Mepilex on coccyx. IR groin site intact. HGB 8.1 & 7.7 today. Creat 1.46. WOC, respiratory, speech, & CM/SW following. Plan pending.

## 2021-05-27 ENCOUNTER — APPOINTMENT (OUTPATIENT)
Dept: SPEECH THERAPY | Facility: CLINIC | Age: 81
DRG: 871 | End: 2021-05-27
Payer: COMMERCIAL

## 2021-05-27 LAB
ANION GAP SERPL CALCULATED.3IONS-SCNC: <1 MMOL/L (ref 3–14)
BUN SERPL-MCNC: 26 MG/DL (ref 7–30)
CALCIUM SERPL-MCNC: 8.3 MG/DL (ref 8.5–10.1)
CHLORIDE SERPL-SCNC: 116 MMOL/L (ref 94–109)
CO2 SERPL-SCNC: 35 MMOL/L (ref 20–32)
CREAT SERPL-MCNC: 1.3 MG/DL (ref 0.66–1.25)
ERYTHROCYTE [DISTWIDTH] IN BLOOD BY AUTOMATED COUNT: 20.7 % (ref 10–15)
GFR SERPL CREATININE-BSD FRML MDRD: 52 ML/MIN/{1.73_M2}
GLUCOSE SERPL-MCNC: 120 MG/DL (ref 70–99)
HCT VFR BLD AUTO: 25.7 % (ref 40–53)
HGB BLD-MCNC: 7.3 G/DL (ref 13.3–17.7)
HGB BLD-MCNC: 7.7 G/DL (ref 13.3–17.7)
MCH RBC QN AUTO: 27.8 PG (ref 26.5–33)
MCHC RBC AUTO-ENTMCNC: 28.4 G/DL (ref 31.5–36.5)
MCV RBC AUTO: 98 FL (ref 78–100)
PLATELET # BLD AUTO: 223 10E9/L (ref 150–450)
POTASSIUM SERPL-SCNC: 3.4 MMOL/L (ref 3.4–5.3)
POTASSIUM SERPL-SCNC: 3.7 MMOL/L (ref 3.4–5.3)
RBC # BLD AUTO: 2.63 10E12/L (ref 4.4–5.9)
SODIUM SERPL-SCNC: 150 MMOL/L (ref 133–144)
SODIUM SERPL-SCNC: 151 MMOL/L (ref 133–144)
SODIUM SERPL-SCNC: 151 MMOL/L (ref 133–144)
WBC # BLD AUTO: 8.3 10E9/L (ref 4–11)

## 2021-05-27 PROCEDURE — 258N000003 HC RX IP 258 OP 636: Performed by: INTERNAL MEDICINE

## 2021-05-27 PROCEDURE — 250N000011 HC RX IP 250 OP 636: Performed by: INTERNAL MEDICINE

## 2021-05-27 PROCEDURE — 84132 ASSAY OF SERUM POTASSIUM: CPT | Performed by: INTERNAL MEDICINE

## 2021-05-27 PROCEDURE — 36415 COLL VENOUS BLD VENIPUNCTURE: CPT | Performed by: INTERNAL MEDICINE

## 2021-05-27 PROCEDURE — 84295 ASSAY OF SERUM SODIUM: CPT | Performed by: INTERNAL MEDICINE

## 2021-05-27 PROCEDURE — 85027 COMPLETE CBC AUTOMATED: CPT | Performed by: INTERNAL MEDICINE

## 2021-05-27 PROCEDURE — 250N000013 HC RX MED GY IP 250 OP 250 PS 637: Performed by: PHYSICIAN ASSISTANT

## 2021-05-27 PROCEDURE — 92526 ORAL FUNCTION THERAPY: CPT | Mod: GN

## 2021-05-27 PROCEDURE — 250N000013 HC RX MED GY IP 250 OP 250 PS 637: Performed by: INTERNAL MEDICINE

## 2021-05-27 PROCEDURE — 85018 HEMOGLOBIN: CPT | Performed by: INTERNAL MEDICINE

## 2021-05-27 PROCEDURE — 80048 BASIC METABOLIC PNL TOTAL CA: CPT | Performed by: INTERNAL MEDICINE

## 2021-05-27 PROCEDURE — 250N000013 HC RX MED GY IP 250 OP 250 PS 637: Performed by: HOSPITALIST

## 2021-05-27 PROCEDURE — 120N000013 HC R&B IMCU

## 2021-05-27 PROCEDURE — 99233 SBSQ HOSP IP/OBS HIGH 50: CPT | Performed by: INTERNAL MEDICINE

## 2021-05-27 RX ORDER — POTASSIUM CHLORIDE 1.5 G/1.58G
40 POWDER, FOR SOLUTION ORAL ONCE
Status: DISCONTINUED | OUTPATIENT
Start: 2021-05-27 | End: 2021-05-27

## 2021-05-27 RX ORDER — METHYLPREDNISOLONE SODIUM SUCCINATE 125 MG/2ML
125 INJECTION, POWDER, LYOPHILIZED, FOR SOLUTION INTRAMUSCULAR; INTRAVENOUS
Status: DISCONTINUED | OUTPATIENT
Start: 2021-05-27 | End: 2021-06-02

## 2021-05-27 RX ORDER — DIPHENHYDRAMINE HYDROCHLORIDE 50 MG/ML
50 INJECTION INTRAMUSCULAR; INTRAVENOUS
Status: DISCONTINUED | OUTPATIENT
Start: 2021-05-27 | End: 2021-06-02

## 2021-05-27 RX ORDER — POTASSIUM CHLORIDE 1500 MG/1
40 TABLET, EXTENDED RELEASE ORAL ONCE
Status: COMPLETED | OUTPATIENT
Start: 2021-05-27 | End: 2021-05-27

## 2021-05-27 RX ADMIN — MULTIPLE VITAMINS W/ MINERALS TAB 1 TABLET: TAB at 09:08

## 2021-05-27 RX ADMIN — IPRATROPIUM BROMIDE AND ALBUTEROL 1 PUFF: 20; 100 SPRAY, METERED RESPIRATORY (INHALATION) at 14:18

## 2021-05-27 RX ADMIN — METOPROLOL TARTRATE 12.5 MG: 25 TABLET, FILM COATED ORAL at 09:08

## 2021-05-27 RX ADMIN — METOPROLOL TARTRATE 12.5 MG: 25 TABLET, FILM COATED ORAL at 21:27

## 2021-05-27 RX ADMIN — PIPERACILLIN SODIUM AND TAZOBACTAM SODIUM 4.5 G: 4; .5 INJECTION, POWDER, LYOPHILIZED, FOR SOLUTION INTRAVENOUS at 08:53

## 2021-05-27 RX ADMIN — POTASSIUM CHLORIDE 40 MEQ: 1500 TABLET, EXTENDED RELEASE ORAL at 09:30

## 2021-05-27 RX ADMIN — ATORVASTATIN CALCIUM 10 MG: 10 TABLET, FILM COATED ORAL at 09:08

## 2021-05-27 RX ADMIN — PIPERACILLIN SODIUM AND TAZOBACTAM SODIUM 4.5 G: 4; .5 INJECTION, POWDER, LYOPHILIZED, FOR SOLUTION INTRAVENOUS at 03:48

## 2021-05-27 RX ADMIN — IRON SUCROSE 200 MG: 20 INJECTION, SOLUTION INTRAVENOUS at 10:55

## 2021-05-27 RX ADMIN — PANTOPRAZOLE SODIUM 40 MG: 40 TABLET, DELAYED RELEASE ORAL at 17:11

## 2021-05-27 RX ADMIN — PIPERACILLIN SODIUM AND TAZOBACTAM SODIUM 4.5 G: 4; .5 INJECTION, POWDER, LYOPHILIZED, FOR SOLUTION INTRAVENOUS at 14:30

## 2021-05-27 RX ADMIN — PANTOPRAZOLE SODIUM 40 MG: 40 TABLET, DELAYED RELEASE ORAL at 06:59

## 2021-05-27 RX ADMIN — PIPERACILLIN SODIUM AND TAZOBACTAM SODIUM 4.5 G: 4; .5 INJECTION, POWDER, LYOPHILIZED, FOR SOLUTION INTRAVENOUS at 21:27

## 2021-05-27 RX ADMIN — IPRATROPIUM BROMIDE AND ALBUTEROL 1 PUFF: 20; 100 SPRAY, METERED RESPIRATORY (INHALATION) at 09:10

## 2021-05-27 RX ADMIN — IPRATROPIUM BROMIDE AND ALBUTEROL 1 PUFF: 20; 100 SPRAY, METERED RESPIRATORY (INHALATION) at 21:40

## 2021-05-27 RX ADMIN — ALLOPURINOL 300 MG: 300 TABLET ORAL at 09:08

## 2021-05-27 RX ADMIN — IPRATROPIUM BROMIDE AND ALBUTEROL 1 PUFF: 20; 100 SPRAY, METERED RESPIRATORY (INHALATION) at 18:02

## 2021-05-27 RX ADMIN — PAROXETINE HYDROCHLORIDE 20 MG: 20 TABLET, FILM COATED ORAL at 09:08

## 2021-05-27 RX ADMIN — Medication 500 MCG: at 09:08

## 2021-05-27 ASSESSMENT — ACTIVITIES OF DAILY LIVING (ADL)
ADLS_ACUITY_SCORE: 26
ADLS_ACUITY_SCORE: 24
ADLS_ACUITY_SCORE: 24
ADLS_ACUITY_SCORE: 26
ADLS_ACUITY_SCORE: 24
ADLS_ACUITY_SCORE: 24

## 2021-05-27 NOTE — PLAN OF CARE
A&OX4. VSS. Denies pain.  Oxygen saturations 90-91% on 4 L NC, unable to wean any lower today.   Refusing to use IS despite much encouragement and education on importance of use.  Does use acapella with much encouragement.  Frequent dry, nonproductive cough. Lung sounds diminished.  Left sided weakness, other neuros intact.  Gluteal dressings changed per order at 1700. Pulsate mattress, turn q2 hours. Cardona catheter draining allan urine.

## 2021-05-27 NOTE — PROGRESS NOTES
[facility]    Medicine Progress Note - Hospitalist Service       Date of Admission:  5/18/2021  Assessment & Plan         Ron Gilmore is a 80 year old male admitted on 5/18/2021.  Complex past medical history of CVA with residual left-sided weakness, COPD, chronic urinary retention with chronic indwelling Cardona catheter, history of septic shock secondary to E. 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  Also history of dyslipidemia, depression, diabetes mellitus type 2 diet-controlled and dysphagia and recent COVID-19 infection and recent admission to [facility] from 05/10 to 05/17 with acute diastolic congestive heart failure, hypoxic respiratory failure, possible pneumonia.  He is re admitted on 5/18/2021 with fever and possible worsening of oxygenation.        Acute on chronic hypoxic respiratory failure  SIRS  Suspected pulmonary embolism s/p IVC filter 5/24  Pulmonary infiltrates   Chronic obstructive lung disease/emphysema  Sleep disordered breathing   Recent COVID-19 infection/COVID-recovered  Right peroneal vein DVT  CT negative for pulmonary embolism in main arteries but segmental or smaller not ruled out due to motion artifact.  D-dimer 1.7.  Lungs showed emphysema and patchy lower lobe infiltrates but small.  No pulmonary edema.  Pulmonary embolism is still a concern especially with the right calf deep venous thrombosis. CT does show emphysema and bilateral lower lobe infiltrates.  He was initially febrile but after starting antibiotics, he has no more fever.  No leukocytosis and pro-calcitonin is undetectable.  * weaned from HFNC 5/22, now down to 4L NC which appears to be level he was discharged from the hospital during his recent admission.   * lovenox held 5/23 due to probable GIB and underwent IVC filter placement  5/24    Continue intravenous Zosyn x 10 days-day 9    Wean oxygen as able; down to 4L NC on 5/27    Holding lasix at this time-hypernatremia, poor oral intake    Resume anticoagulation once safe from GI perspective--holding at this time-see below    Continue inhaled bronchodilators     Aggressive pulm toilet with flutter valve, refuses IS use    Up in a chair       Acute on chronic anemia due to probable upper GIB  Hgb 3/2021 wnl but fell to about 10 g/dL following hospitalization that month and stable at admission, but trended down over several days with RN noting melanic stool 5/23.  Hx colon polyps on colonoscopy in 2006, no prior hx GIB.      - received 1unit PRBC 5/24 for hgb 6.6   - hgb down to 7.3 from 8 yesterday, had BM yesterday, unclear if melena, however BUN down trended on labs and down trend could be component of dilutional from IVF  - will give Venofer today and tomorrow given suspected GI bleed  - recheck hemoglobin later this afternoon at 1400  - continue PPI now PO BID  - hold aspirin and lovenox  - GI followed; holding on EGD due to resp status initially and hemodynamically stable. Patient declined EGD and GI signed off     AIDA  Likely combination of diuresis for resp failure and GIB.  Baseline Cr 0.7.  Admission Cr 1.3 initially improved to 1.0 but subsequently trended up again.  - Cr 1.3 onn 5/27  - avoid nephrotoxins   - monitor off IVF    Hypernatremia--> improving  Due to poor oral intake and possible component of IVF  - Na peaked at 158 on 5/26, improved to 151 after D5W  - stop D5W  - encourage oral hydration  - f/u Na level in AM       Hypokalemia  - K replacement protocol    Chronic indwelling urinary catheter  Urine culture growing Candida, as currently afebrile with negative blood cultures will not treat.     Continue catheter      Hypertension   Intermittent atrial fibrillation not on anticoagulation   Chronic diastolic congestive heart failure   Appears compensated.  BNP down from  4500 at recent discharge to 800.  No pulmonary edema on CT scan.  Was not previously on anticoagulation for A-fib.     Hold aspirin and lasix as above      Type 2 diabetes mellitus     Stable, continue dietary control      Depression     Continue paroxetine      Stroke history   He was not on anticoagulation for atrial fibrillation but is currently being anticoagulated for the deep venous thrombosis.  Wheelchair bound at baseline.  - Continue statin; aspirin held     Severe malnutrition  In the context of acute illness.Chronic illness or disease.   - nutrition following       Diet: Combination Diet Dysphagia Diet Level 2: Mechan Altered; Nectar Thickened Liquids (pre-thickened or use instant food thickener) (by tsp); 2 gm NA Diet  Snacks/Supplements Adult: Magic Cup; Between Meals    DVT Prophylaxis: Pneumatic Compression Devices  Cardona Catheter: in place, indication: Retention  Code Status: Full Code           Disposition Plan   Expected discharge: 2 - 3 days, recommended to transitional care unit once pending stable Na and hemoglobin levels, resp status improving at this time.  Entered: Tien Gimenez MD 05/27/2021, 8:26 AM       The patient's care was discussed with the Bedside Nurse, Patient and Patient's Family.    Tien Gimenez MD  Hospitalist Service  [facility]  Contact information available via [facility] Paging/Directory    ______________________________________________________________________    Interval History   Patient reports breathing is improved. Oxygen down to 4L but noted destats with repositioning. He was up to chair yesterday and plans to do the same today. Refuses to use IS despite encouragement.  tmax 98.5.  1BM yesterday, but unclear if melena or that resolved.        Data reviewed today: I reviewed all medications, new labs and imaging results over the last 24 hours. I personally reviewed no images or EKG's today.    Physical Exam   Vital Signs: Temp: 98.5   F (36.9  C) Temp src: Oral BP: 125/66 Pulse: 87   Resp: 18 SpO2: 95 % O2 Device: Nasal cannula Oxygen Delivery: 4 LPM  Weight: 210 lbs 15.68 oz  General Appearance: Alert, awake and appropriate  Respiratory: bibasilar crackles, upper lung fields are clear to auscultation  Cardiovascular: regular rate and rhythm  GI: soft and non-tender  Skin: warm and dry      Data   Recent Labs   Lab 05/27/21  0611 05/27/21  0009 05/26/21  1811 05/26/21  0637 05/26/21  0637 05/25/21  2242 05/25/21  0459 05/25/21  0459   WBC 8.3  --   --   --  7.0  --   --  7.9   HGB 7.3*  --   --   --  8.0* 7.7*   < > 7.8*   MCV 98  --   --   --  98  --   --  96     --   --   --  258  --   --  269   * 151* 154*   < > 158*  --   --  153*   POTASSIUM 3.4  --  3.3*  --  3.1*  --   --  3.5   CHLORIDE 116*  --   --   --  119*  --   --  114*   CO2 35*  --   --   --  36*  --   --  36*   BUN 26  --   --   --  33*  --   --  40*   CR 1.30*  --   --   --  1.40*  --   --  1.46*   ANIONGAP <1*  --   --   --  3  --   --  3   RAJ 8.3*  --   --   --  8.9  --   --  9.0   *  --   --   --  107*  --   --  115*    < > = values in this interval not displayed.     No results found for this or any previous visit (from the past 24 hour(s)).  Medications       allopurinol  300 mg Oral Daily     [Held by provider] aspirin  81 mg Oral Daily     atorvastatin  10 mg Oral Daily     cyanocobalamin  500 mcg Sublingual Daily     [Held by provider] enoxaparin ANTICOAGULANT  1 mg/kg Subcutaneous Q12H     [Held by provider] furosemide  40 mg Oral Daily     ipratropium-albuterol  1 puff Inhalation 4x Daily     iron sucrose (VENOFER) intermittent infusion (200 mg)  200 mg Intravenous Q24H     metoprolol tartrate  12.5 mg Oral BID     multivitamin w/minerals  1 tablet Oral Daily     pantoprazole  40 mg Oral BID AC     PARoxetine  20 mg Oral Daily     piperacillin-tazobactam  4.5 g Intravenous Q6H     polyethylene glycol  17 g Oral Daily     potassium chloride  40 mEq  Oral or Feeding Tube Once     sodium chloride (PF)  3 mL Intracatheter Q8H

## 2021-05-28 LAB
ABO + RH BLD: NORMAL
ABO + RH BLD: NORMAL
ANION GAP SERPL CALCULATED.3IONS-SCNC: 2 MMOL/L (ref 3–14)
BLD GP AB SCN SERPL QL: NORMAL
BLD PROD TYP BPU: NORMAL
BLOOD BANK CMNT PATIENT-IMP: NORMAL
BUN SERPL-MCNC: 18 MG/DL (ref 7–30)
CALCIUM SERPL-MCNC: 8.3 MG/DL (ref 8.5–10.1)
CHLORIDE SERPL-SCNC: 116 MMOL/L (ref 94–109)
CO2 SERPL-SCNC: 32 MMOL/L (ref 20–32)
CREAT SERPL-MCNC: 1.35 MG/DL (ref 0.66–1.25)
GFR SERPL CREATININE-BSD FRML MDRD: 50 ML/MIN/{1.73_M2}
GLUCOSE SERPL-MCNC: 97 MG/DL (ref 70–99)
HGB BLD-MCNC: 6.9 G/DL (ref 13.3–17.7)
HGB BLD-MCNC: 8.4 G/DL (ref 13.3–17.7)
NUM BPU REQUESTED: 1
PLATELET # BLD AUTO: 211 10E9/L (ref 150–450)
POTASSIUM SERPL-SCNC: 3.3 MMOL/L (ref 3.4–5.3)
POTASSIUM SERPL-SCNC: 3.8 MMOL/L (ref 3.4–5.3)
SODIUM SERPL-SCNC: 150 MMOL/L (ref 133–144)
SPECIMEN EXP DATE BLD: NORMAL
UPPER GI ENDOSCOPY: NORMAL

## 2021-05-28 PROCEDURE — 43235 EGD DIAGNOSTIC BRUSH WASH: CPT | Performed by: INTERNAL MEDICINE

## 2021-05-28 PROCEDURE — 84132 ASSAY OF SERUM POTASSIUM: CPT | Performed by: INTERNAL MEDICINE

## 2021-05-28 PROCEDURE — 250N000013 HC RX MED GY IP 250 OP 250 PS 637: Performed by: INTERNAL MEDICINE

## 2021-05-28 PROCEDURE — 999N000099 HC STATISTIC MODERATE SEDATION < 10 MIN: Performed by: INTERNAL MEDICINE

## 2021-05-28 PROCEDURE — 250N000011 HC RX IP 250 OP 636: Performed by: INTERNAL MEDICINE

## 2021-05-28 PROCEDURE — 86850 RBC ANTIBODY SCREEN: CPT | Performed by: INTERNAL MEDICINE

## 2021-05-28 PROCEDURE — 86923 COMPATIBILITY TEST ELECTRIC: CPT | Performed by: INTERNAL MEDICINE

## 2021-05-28 PROCEDURE — 86900 BLOOD TYPING SEROLOGIC ABO: CPT | Performed by: INTERNAL MEDICINE

## 2021-05-28 PROCEDURE — 0DJ08ZZ INSPECTION OF UPPER INTESTINAL TRACT, VIA NATURAL OR ARTIFICIAL OPENING ENDOSCOPIC: ICD-10-PCS | Performed by: INTERNAL MEDICINE

## 2021-05-28 PROCEDURE — P9016 RBC LEUKOCYTES REDUCED: HCPCS | Performed by: INTERNAL MEDICINE

## 2021-05-28 PROCEDURE — 258N000003 HC RX IP 258 OP 636: Performed by: INTERNAL MEDICINE

## 2021-05-28 PROCEDURE — 250N000013 HC RX MED GY IP 250 OP 250 PS 637: Performed by: PHYSICIAN ASSISTANT

## 2021-05-28 PROCEDURE — 85049 AUTOMATED PLATELET COUNT: CPT | Performed by: INTERNAL MEDICINE

## 2021-05-28 PROCEDURE — 120N000013 HC R&B IMCU

## 2021-05-28 PROCEDURE — 80048 BASIC METABOLIC PNL TOTAL CA: CPT | Performed by: INTERNAL MEDICINE

## 2021-05-28 PROCEDURE — 250N000009 HC RX 250: Performed by: INTERNAL MEDICINE

## 2021-05-28 PROCEDURE — 86901 BLOOD TYPING SEROLOGIC RH(D): CPT | Performed by: INTERNAL MEDICINE

## 2021-05-28 PROCEDURE — 99233 SBSQ HOSP IP/OBS HIGH 50: CPT | Performed by: INTERNAL MEDICINE

## 2021-05-28 PROCEDURE — 250N000013 HC RX MED GY IP 250 OP 250 PS 637: Performed by: HOSPITALIST

## 2021-05-28 PROCEDURE — 85018 HEMOGLOBIN: CPT | Performed by: INTERNAL MEDICINE

## 2021-05-28 PROCEDURE — 36415 COLL VENOUS BLD VENIPUNCTURE: CPT | Performed by: INTERNAL MEDICINE

## 2021-05-28 RX ORDER — PIPERACILLIN SODIUM, TAZOBACTAM SODIUM 4; .5 G/20ML; G/20ML
4.5 INJECTION, POWDER, LYOPHILIZED, FOR SOLUTION INTRAVENOUS EVERY 6 HOURS
Status: DISCONTINUED | OUTPATIENT
Start: 2021-05-28 | End: 2021-05-28

## 2021-05-28 RX ORDER — PIPERACILLIN SODIUM, TAZOBACTAM SODIUM 4; .5 G/20ML; G/20ML
4.5 INJECTION, POWDER, LYOPHILIZED, FOR SOLUTION INTRAVENOUS ONCE
Status: DISCONTINUED | OUTPATIENT
Start: 2021-05-28 | End: 2021-05-28

## 2021-05-28 RX ORDER — POTASSIUM CHLORIDE 1.5 G/1.58G
40 POWDER, FOR SOLUTION ORAL ONCE
Status: COMPLETED | OUTPATIENT
Start: 2021-05-28 | End: 2021-05-28

## 2021-05-28 RX ORDER — BISACODYL 5 MG
20 TABLET, DELAYED RELEASE (ENTERIC COATED) ORAL ONCE
Status: COMPLETED | OUTPATIENT
Start: 2021-05-28 | End: 2021-05-28

## 2021-05-28 RX ORDER — POLYETHYLENE GLYCOL 3350 17 G/17G
238 POWDER, FOR SOLUTION ORAL ONCE
Status: COMPLETED | OUTPATIENT
Start: 2021-05-28 | End: 2021-05-28

## 2021-05-28 RX ORDER — LIDOCAINE 40 MG/G
CREAM TOPICAL
Status: DISCONTINUED | OUTPATIENT
Start: 2021-05-28 | End: 2021-05-28 | Stop reason: HOSPADM

## 2021-05-28 RX ADMIN — BISACODYL 20 MG: 5 TABLET, COATED ORAL at 16:57

## 2021-05-28 RX ADMIN — ATORVASTATIN CALCIUM 10 MG: 10 TABLET, FILM COATED ORAL at 13:17

## 2021-05-28 RX ADMIN — POLYETHYLENE GLYCOL 3350 238 G: 17 POWDER, FOR SOLUTION ORAL at 18:10

## 2021-05-28 RX ADMIN — PIPERACILLIN SODIUM AND TAZOBACTAM SODIUM 4.5 G: 4; .5 INJECTION, POWDER, LYOPHILIZED, FOR SOLUTION INTRAVENOUS at 02:21

## 2021-05-28 RX ADMIN — PANTOPRAZOLE SODIUM 40 MG: 40 TABLET, DELAYED RELEASE ORAL at 16:57

## 2021-05-28 RX ADMIN — IPRATROPIUM BROMIDE AND ALBUTEROL 1 PUFF: 20; 100 SPRAY, METERED RESPIRATORY (INHALATION) at 23:48

## 2021-05-28 RX ADMIN — IPRATROPIUM BROMIDE AND ALBUTEROL 1 PUFF: 20; 100 SPRAY, METERED RESPIRATORY (INHALATION) at 18:30

## 2021-05-28 RX ADMIN — IPRATROPIUM BROMIDE AND ALBUTEROL 1 PUFF: 20; 100 SPRAY, METERED RESPIRATORY (INHALATION) at 13:44

## 2021-05-28 RX ADMIN — MIDAZOLAM 2 MG: 1 INJECTION INTRAMUSCULAR; INTRAVENOUS at 11:23

## 2021-05-28 RX ADMIN — ALLOPURINOL 300 MG: 300 TABLET ORAL at 13:18

## 2021-05-28 RX ADMIN — IRON SUCROSE 200 MG: 20 INJECTION, SOLUTION INTRAVENOUS at 16:57

## 2021-05-28 RX ADMIN — METOPROLOL TARTRATE 12.5 MG: 25 TABLET, FILM COATED ORAL at 13:18

## 2021-05-28 RX ADMIN — Medication 500 MCG: at 13:17

## 2021-05-28 RX ADMIN — PANTOPRAZOLE SODIUM 40 MG: 40 TABLET, DELAYED RELEASE ORAL at 07:41

## 2021-05-28 RX ADMIN — METOPROLOL TARTRATE 12.5 MG: 25 TABLET, FILM COATED ORAL at 20:29

## 2021-05-28 RX ADMIN — PAROXETINE HYDROCHLORIDE 20 MG: 20 TABLET, FILM COATED ORAL at 13:17

## 2021-05-28 RX ADMIN — PIPERACILLIN SODIUM AND TAZOBACTAM SODIUM 4.5 G: 4; .5 INJECTION, POWDER, LYOPHILIZED, FOR SOLUTION INTRAVENOUS at 13:31

## 2021-05-28 RX ADMIN — POTASSIUM CHLORIDE 40 MEQ: 1.5 POWDER, FOR SOLUTION ORAL at 18:10

## 2021-05-28 RX ADMIN — MULTIPLE VITAMINS W/ MINERALS TAB 1 TABLET: TAB at 13:18

## 2021-05-28 RX ADMIN — TOPICAL ANESTHETIC 1 SPRAY: 200 SPRAY DENTAL; PERIODONTAL at 11:20

## 2021-05-28 ASSESSMENT — ACTIVITIES OF DAILY LIVING (ADL)
ADLS_ACUITY_SCORE: 24
ADLS_ACUITY_SCORE: 26
ADLS_ACUITY_SCORE: 24
ADLS_ACUITY_SCORE: 26
ADLS_ACUITY_SCORE: 24
ADLS_ACUITY_SCORE: 26

## 2021-05-28 NOTE — PROGRESS NOTES
"SPIRITUAL HEALTH SERVICES Progress Note  [facility]    Visited pt due to length of stay. I introduced myself and SH services and pt shared that he does not have any SH needs at this time.    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 He is \"spiritual but not Christian\". He also shared that he has been in the hospital for most of this year and looks forward to being able to go home to his condo in [place], with his wife. I offered words of our care and support for him as he continues his recovery and healing.    SHS remains available.      Carly Petty  Chaplain Resident    "

## 2021-05-28 NOTE — PROGRESS NOTES
"CLINICAL NUTRITION SERVICES - REASSESSMENT NOTE      Recommendations Ordered by Registered Dietitian (RD):     Discontinue the magic cup supplements  Will send a chocolate Vital Cuisine left in the bottle with meals (NTL supplement, 520 cals/22 gm pro each)  Encouraged pt to push po intake for recovery     Malnutrition: (5/25)  % Weight Loss:  > 5% in 1 month (severe malnutrition)  % Intake:  </= 50% for >/= 5 days (severe malnutrition)  Subcutaneous Fat Loss:  Upper arm region moderate depletion  Muscle Loss:  Clavicle bone region mild depletion, Acromion bone region mild depletion and Dorsal hand region mild depletion  Fluid Retention:  Mild as above      Malnutrition Diagnosis: Severe malnutrition  In Context of:  Acute illness or injury  Chronic illness or disease       EVALUATION OF PROGRESS TOWARD GOALS   Diet:    DD2, NTL  Magic Cup at 10am and 2pm    Intake/Tolerance:    Chart reviewed  Pt NPO today for EGD  Visited with pt this morning  Notes that appetite is in the pits - \"can't taste anything\"  Doesn't really like the magic cup - \"comes room temp\"  Likes Ensure, but dislikes that it is poured into a cup and thickened - \"need to drink it right out of the bottle, otherwise it goes all over my face\"    Daily multivitamin     ASSESSED NUTRITION NEEDS PER APPROVED PRACTICE GUIDELINES:     Dosing Weight 85 kg (adjusted)  Estimated Energy Needs: 1042-1857 kcals (25-30 Kcal/Kg)  Justification: overweight  Estimated Protein Needs: 100-125 grams protein (1.2-1.5 g pro/Kg)  Justification: hypercatabolism with acute illness    NEW FINDINGS:   Anemia - EGD today  5/28: Na 150 (H)           K 3.3 (L)    Previous Goals (5/25):   Patient will order at least 2 meals per day (3-4 items per meal) and consume at least 50% meals and supplements   Evaluation: Not met    Previous Nutrition Diagnosis (5/25):   Inadequate oral intake related to poor appetite and loss of taste with COVID as evidenced by minimal intake since admit "   Evaluation: No change        CURRENT NUTRITION DIAGNOSIS  Inadequate oral intake related to poor appetite and no taste as evidenced by pt report minimal intake at meals    INTERVENTIONS  Recommendations / Nutrition Prescription  DD2, NTL  Nutrition supplement      Implementation  Discontinue the magic cup supplements  Will send a chocolate Vital Cuisine left in the bottle with meals (NTL supplement, 520 cals/22 gm pro each)  Encouraged pt to push po intake for recovery    Goals  Pt to consume 50% meals and 2 supplements per day      MONITORING AND EVALUATION:  Progress towards goals will be monitored and evaluated per protocol and Practice Guidelines

## 2021-05-28 NOTE — CONSULTS
[facility]  Gastroenterology Consultation         Ron Gilmore  [address]   [place] [address]  80 year old male    Admission Date/Time: 5/18/2021  Primary Care Provider: Augustin Thomas  Referring / Attending Physician:  Dr. Tien Gimenez    We were asked to see the patient in consultation by Dr. Tien Gimenez for evaluation of anemia.      CC: anemia    HPI:  Ron Gilmore is a 80 year old male who was seen by our office earlier in this hospitalization for the same reason. He had refused any further endoscopic procedures, therefore, signed off. Continues to drop hemoglobin and has had darker stools and mild dysphagia. Hemoglobin baseline 11-12 and now at 6.9.  On 4 LPM and respiratory status improving. He is re admitted on 5/18/2021 with fever and possible worsening of oxygenation with slow drop in hemoglobin and reported melena.    ROS: A comprehensive ten point review of systems was negative aside from those in mentioned in the HPI.      PAST MED HX:  I have reviewed this patient's medical history and updated it with pertinent information if needed.   Past Medical History:   Diagnosis Date     BPH (benign prostatic hyperplasia)      Cataract      Cholelithiasis      COPD (chronic obstructive pulmonary disease) (H)      Depression      Diabetes mellitus     Type 2     Dyshidrotic foot dermatitis      Edema      Gout      Hyperlipidemia      Hypertension      Kidney stones     Bladder Stones     Lumbago      Lumbar disc displacement without myelopathy      Muscle weakness      Neuropathy, diabetic (H)      Obesity      Spinal stenosis      Stroke (H)     with residual effects- weakness LUE> LLE     Unsteady gait      Urinary retention with incomplete bladder emptying      UTI (urinary tract infection)        MEDICATIONS:   Prior to Admission Medications   Prescriptions Last Dose Informant Patient Reported? Taking?   Emollient (AMLACTIN ULTRA EX) prn Nursing Home  Yes No   Sig: Apply topically daily as needed TO FEET   PARoxetine (PAXIL) 20 MG tablet 5/18/2021 at am Nursing Home Yes Yes   Sig: Take 20 mg by mouth daily   acetaminophen (TYLENOL) 325 MG tablet 5/10/2021 at 1632 Nursing Home Yes No   Sig: Take 650 mg by mouth every 4 hours as needed for mild pain as needed for pain/fever Max dose 3000mg/24hr   allopurinol (ZYLOPRIM) 300 MG tablet 5/18/2021 at am Nursing Home Yes Yes   Sig: Take 300 mg by mouth daily   aspirin (ASA) 325 MG EC tablet 5/18/2021 at am Nursing Home No Yes   Sig: Take 1 tablet (325 mg) by mouth daily   atorvastatin (LIPITOR) 10 MG tablet 5/18/2021 at am Nursing Home Yes Yes   Sig: Take 10 mg by mouth daily   cyanocobalamin (VITAMIN B-12) 500 MCG SUBL sublingual tablet 5/18/2021 at am Nursing Home No Yes   Sig: Place 1 tablet (500 mcg) under the tongue daily   furosemide (LASIX) 40 MG tablet 5/18/2021 at am Nursing Home No Yes   Sig: Take 1 tablet (40 mg) by mouth daily   ipratropium-albuterol (COMBIVENT RESPIMAT)  MCG/ACT inhaler 5/18/2021 at [facility] Home Yes Yes   Sig: Inhale 1 puff into the lungs 4 times daily 0800, 1200 ,1600 ,2000   metoprolol tartrate (LOPRESSOR) 25 MG tablet 5/18/2021 at am Nursing Home No Yes   Sig: Take 0.5 tablets (12.5 mg) by mouth 2 times daily   polyethylene glycol (MIRALAX) 17 GM/Dose powder 5/18/2021 at am Nursing Home No Yes   Sig: Take 17 g by mouth daily      Facility-Administered Medications: None       ALLERGIES: No Known Allergies    SOCIAL HISTORY:  Social History     Tobacco Use     Smoking status: Former Smoker     Smokeless tobacco: Never Used   Substance Use Topics     Alcohol use: No     Comment: none for 29 yrs     Drug use: No       FAMILY HISTORY:  Family History   Problem Relation Age of Onset     Prostate Cancer Father        PHYSICAL EXAM:   General  Alert, oriented and comfortable  Vital Signs with Ranges  Temp: 98  F (36.7  C) Temp src: Oral BP: 112/68 Pulse: 84   Resp: 18 SpO2: 93 % O2  Device: Nasal cannula Oxygen Delivery: 4 LPM  I/O last 3 completed shifts:  In: 600 [P.O.:600]  Out: 1335 [Urine:1335]    Constitutional: healthy, alert and no distress   Cardiovascular: negative, PMI normal. No lifts, heaves, or thrills. RRR. No murmurs, clicks gallops or rub  Respiratory: negative, Percussion normal. Good diaphragmatic excursion. Lungs clear  Abdomen: Abdomen soft, non-tender. BS normal. No masses, organomegaly          ADDITIONAL COMMENTS:   I reviewed the patient's new clinical lab test results.   Recent Labs   Lab Test 05/28/21  0655 05/27/21  1450 05/27/21  0611 05/26/21  0637 05/25/21  0459 05/25/21  0459 03/12/21  1128 03/12/21  1128 03/10/21  1420 03/10/21  1420 01/14/20  2356 01/14/20  2356   WBC  --   --  8.3 7.0  --  7.9   < > 14.1*   < >  --    < > 8.7   HGB 6.9* 7.7* 7.3* 8.0*   < > 7.8*   < > 11.5*   < >  --    < > 13.4   MCV  --   --  98 98  --  96   < > 92   < >  --    < > 93     --  223 258  --  269   < > 21*   < >  --    < > 184   INR  --   --   --   --   --   --   --  1.20*  --  1.58*  --  1.13    < > = values in this interval not displayed.     Recent Labs   Lab Test 05/28/21  0655 05/27/21  1450 05/27/21  0611 05/26/21  0637 05/26/21  0637   POTASSIUM 3.3* 3.7 3.4   < > 3.1*   CHLORIDE 116*  --  116*  --  119*   CO2 32  --  35*  --  36*   BUN 18  --  26  --  33*   ANIONGAP 2*  --  <1*  --  3    < > = values in this interval not displayed.     Recent Labs   Lab Test 05/18/21 2137 05/11/21  0117 05/10/21  2110 05/01/21  1715 05/01/21  1558 03/10/21  0621   ALBUMIN  --   --  2.0*  --  2.5* 1.7*   BILITOTAL  --   --  0.5  --  0.3 1.2   ALT  --   --  37  --  24 25   AST  --   --  35  --  29 51*   PROTEIN 10* 30*  --  50*  --   --        I reviewed the patient's new imaging results.        CONSULTATION ASSESSMENT AND PLAN:    Ron Gilmore is a 80 year old male with respirator failure due to COPD and COVID 19 amongst other problems that is noted to have melena and anemia. GI  consulted with concerns for GI bleed.     Active Problems:    Acute and chronic respiratory failure with hypoxia (H)    Anemia    Melena  Patient has had dark stools and hemoglobin decreased from 11->6.6 and received a unit of PRBC now at 8.0.. He has significant oxygen demands but improving to 5 LPM.   He had multiple complaints with eating, including lack of taste, difficulty and pain. He likely has had some reflux being bed bound and lack of taste due to COVID.    Anemia likely due to upper GI bleed from PUD vs neoplastic cause vs other   An EGD would help determine cause of symptoms and possible source of blood loss. I discussed with patient that we could plan an endoscopy today- he is now agreeable.     -- EGD today  --NPO  -- check serial hemoglobin and transfuse with hemoglobin of 7 or less  -- oral pantoprazole 40 mg BID      [facility].  Office: [phone]  Cell : [phone] (Dr. Davis)  Cell: [phone] (Ramya Evangelista PA-C)

## 2021-05-28 NOTE — PROVIDER NOTIFICATION
Spoke to Ramya Evangelista PA-C with Gastroenterology:    Bowel prep meds ordered for now, colonoscopy not until tomorrow do you want them given now?  -No, can wait until this afternoon    Would rectal tube be appropriate for use with bowel prep as pt is incontinent of stool and also has gluteal wound?  -Verbal phone order taken for rectal tube placement.

## 2021-05-28 NOTE — PROGRESS NOTES
Blood started at 75ml/hr and reached patient at 1023, VSS on 4 L O2 NC    Endo ready for patient, and are aware that blood was just initiated and frequent vitals constant monitoring not completed on floor.    1035: Resource SOFIA Lan transporting pt on a cart down to endo and will increase blood rate down there.

## 2021-05-28 NOTE — PROGRESS NOTES
[facility]    Medicine Progress Note - Hospitalist Service       Date of Admission:  5/18/2021  Assessment & Plan         Ron Gilmore is a 80 year old male admitted on 5/18/2021.  Complex past medical history of CVA with residual left-sided weakness, COPD, chronic urinary retention with chronic indwelling Cardona catheter, history of septic shock secondary to E. 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  Also history of dyslipidemia, depression, diabetes mellitus type 2 diet-controlled and dysphagia and recent COVID-19 infection and recent admission to [facility] from 05/10 to 05/17 with acute diastolic congestive heart failure, hypoxic respiratory failure, possible pneumonia.  He is re admitted on 5/18/2021 with fever and possible worsening of oxygenation.        Acute on chronic hypoxic respiratory failure  SIRS  Suspected pulmonary embolism s/p IVC filter 5/24  Pulmonary infiltrates   Chronic obstructive lung disease/emphysema  Sleep disordered breathing   Recent COVID-19 infection/COVID-recovered  Right peroneal vein DVT  CT negative for pulmonary embolism in main arteries but segmental or smaller not ruled out due to motion artifact.  D-dimer 1.7.  Lungs showed emphysema and patchy lower lobe infiltrates but small.  No pulmonary edema.  Pulmonary embolism is still a concern especially with the right calf deep venous thrombosis. CT does show emphysema and bilateral lower lobe infiltrates.  He was initially febrile but after starting antibiotics, he has no more fever.  No leukocytosis and pro-calcitonin is undetectable.  * weaned from HFNC 5/22, now down to 4L NC which appears to be level he was discharged from the hospital during his recent admission.   * lovenox held 5/23 due to probable GIB and underwent IVC filter placement  5/24    Continue intravenous Zosyn x 10 days- discontinue after 1 more dose    Wean oxygen as able; down to 4L NC on 5/27 which appears to be baseline    Holding lasix at this time-hypernatremia, poor oral intake    Resume anticoagulation once safe from GI perspective--holding at this time-see below    Continue inhaled bronchodilators     Aggressive pulm toilet with flutter valve, refuses IS use    Up in a chair       Acute on chronic anemia due to probable upper GIB  Hgb 3/2021 wnl but fell to about 10 g/dL following hospitalization that month and stable at admission, but trended down over several days with RN noting melanic stool 5/23.  Hx colon polyps on colonoscopy in 2006, no prior hx GIB.      - received 1unit PRBC 5/24 for hgb 6.6   - hgb down to 7.3-->7.7 yesterday, down to 6.9 this am  - transfuse 1unit pRBC  - continue with venofer x 2 doses  - recheck hemoglobin later this today 1600  - continue PPI now PO BID  - hold aspirin and lovenox  - GI followed; initially EGD held due to resp status, then he declined EGD. Due to decreasing hgb, GI recosult on 5/28 and plan for EGD today which patient is in agreement     AIDA  Likely combination of diuresis for resp failure and GIB.  Baseline Cr 0.7.  Admission Cr 1.3 initially improved to 1.0 but subsequently trended up again.  - Cr 1.3-1.4 stable in last few days, ? If this will be new baseline  - avoid nephrotoxins   - monitor off IVF    Hypernatremia--> improving  Due to poor oral intake and possible component of IVF  - Na peaked at 158 on 5/26, improved to 151 after D5W  - stop D5W on 5/27  - Na remains at 150, change zosyn fluid to D5W, discussed with pharmd  - encourage PO intake after EGD  - f/u Na level in AM       Hypokalemia  - K replacement protocol    Chronic indwelling urinary catheter  Urine culture growing Candida, as currently afebrile with negative blood cultures will not treat.     Continue catheter      Hypertension   Intermittent atrial  fibrillation not on anticoagulation   Chronic diastolic congestive heart failure   Appears compensated.  BNP down from 4500 at recent discharge to 800.  No pulmonary edema on CT scan.  Was not previously on anticoagulation for A-fib.   - continue with Metoprolol BID  - Hold aspirin and lasix as above      Type 2 diabetes mellitus     Stable, continue dietary control      Depression     Continue paroxetine      Stroke history   He was not on anticoagulation for atrial fibrillation but is currently being anticoagulated for the deep venous thrombosis.  Wheelchair bound at baseline.  - Continue statin; aspirin held     Severe malnutrition  In the context of acute illness.Chronic illness or disease.   - nutrition following       Diet: Snacks/Supplements Adult: Magic Cup; Between Meals  NPO per Anesthesia Guidelines for Procedure/Surgery Except for: Meds    DVT Prophylaxis: Pneumatic Compression Devices  Cardona Catheter: in place, indication: Retention  Code Status: Full Code           Disposition Plan   Expected discharge: 1-2 days, recommended to transitional care unit once pending stable Na and hemoglobin levels, plan for EGD today. Resp status currently stable.  Entered: Tien Gimenez MD 05/28/2021, 8:41 AM       The patient's care was discussed with the Bedside Nurse and Patient.    Attempted to call [facility] for updated, but went to . Did not leave a message. Will try to call later as able.     Addendum-dtr updated at 1048am    Tien Gimenez MD  Hospitalist Service  [facility]  Contact information available via [facility] Paging/Directory    ______________________________________________________________________    Interval History   Patient states that his breathing is now his normal and currently on 4L. States minimal cough. Afebrile.  Hemoglobin down to 6.9 this am. Per discussion with RN, had 1BM this morning which appeared loose dark stool. He denies abdominal pain, nausea or  vomiting.   GI reconsulted and he agreed for EGD    Data reviewed today: I reviewed all medications, new labs and imaging results over the last 24 hours. I personally reviewed no images or EKG's today.    Physical Exam   Vital Signs: Temp: 98  F (36.7  C) Temp src: Oral BP: 112/68 Pulse: 84   Resp: 18 SpO2: 93 % O2 Device: Nasal cannula Oxygen Delivery: 4 LPM  Weight: 216 lbs 4.34 oz  General Appearance: Alert, awake and appropriate  Respiratory: diminished breath sounds at the bases,  upper lung fields are clear to auscultation  Cardiovascular: regular rate and rhythm  GI: soft and non-tender  Skin: warm and dry      Data   Recent Labs   Lab 05/28/21  0655 05/27/21  1450 05/27/21  1146 05/27/21  0611 05/26/21  0637 05/26/21  0637 05/25/21  0459 05/25/21  0459   WBC  --   --   --  8.3  --  7.0  --  7.9   HGB 6.9* 7.7*  --  7.3*  --  8.0*   < > 7.8*   MCV  --   --   --  98  --  98  --  96     --   --  223  --  258  --  269   *  --  150* 151*   < > 158*  --  153*   POTASSIUM 3.3* 3.7  --  3.4   < > 3.1*  --  3.5   CHLORIDE 116*  --   --  116*  --  119*  --  114*   CO2 32  --   --  35*  --  36*  --  36*   BUN 18  --   --  26  --  33*  --  40*   CR 1.35*  --   --  1.30*  --  1.40*  --  1.46*   ANIONGAP 2*  --   --  <1*  --  3  --  3   RAJ 8.3*  --   --  8.3*  --  8.9  --  9.0   GLC 97  --   --  120*  --  107*  --  115*    < > = values in this interval not displayed.     No results found for this or any previous visit (from the past 24 hour(s)).  Medications       allopurinol  300 mg Oral Daily     [Held by provider] aspirin  81 mg Oral Daily     atorvastatin  10 mg Oral Daily     cyanocobalamin  500 mcg Sublingual Daily     [Held by provider] enoxaparin ANTICOAGULANT  1 mg/kg Subcutaneous Q12H     [Held by provider] furosemide  40 mg Oral Daily     ipratropium-albuterol  1 puff Inhalation 4x Daily     iron sucrose (VENOFER) intermittent infusion (200 mg)  200 mg Intravenous Q24H     metoprolol tartrate   12.5 mg Oral BID     multivitamin w/minerals  1 tablet Oral Daily     pantoprazole  40 mg Oral BID AC     PARoxetine  20 mg Oral Daily     piperacillin-tazobactam  4.5 g Intravenous Q6H     polyethylene glycol  17 g Oral Daily     potassium chloride  40 mEq Oral or Feeding Tube Once     sodium chloride (PF)  3 mL Intracatheter Q8H

## 2021-05-29 ENCOUNTER — APPOINTMENT (OUTPATIENT)
Dept: GENERAL RADIOLOGY | Facility: CLINIC | Age: 81
DRG: 871 | End: 2021-05-29
Attending: HOSPITALIST
Payer: COMMERCIAL

## 2021-05-29 LAB
ANION GAP SERPL CALCULATED.3IONS-SCNC: 5 MMOL/L (ref 3–14)
BASE EXCESS BLDA CALC-SCNC: 4.8 MMOL/L
BLD PROD TYP BPU: NORMAL
BLD UNIT ID BPU: 0
BLOOD PRODUCT CODE: NORMAL
BPU ID: NORMAL
BUN SERPL-MCNC: 15 MG/DL (ref 7–30)
CALCIUM SERPL-MCNC: 8.4 MG/DL (ref 8.5–10.1)
CHLORIDE SERPL-SCNC: 115 MMOL/L (ref 94–109)
CO2 SERPL-SCNC: 30 MMOL/L (ref 20–32)
COLONOSCOPY: NORMAL
CREAT SERPL-MCNC: 1.25 MG/DL (ref 0.66–1.25)
ERYTHROCYTE [DISTWIDTH] IN BLOOD BY AUTOMATED COUNT: 21.1 % (ref 10–15)
GFR SERPL CREATININE-BSD FRML MDRD: 55 ML/MIN/{1.73_M2}
GLUCOSE SERPL-MCNC: 95 MG/DL (ref 70–99)
HCO3 BLD-SCNC: 28 MMOL/L (ref 21–28)
HCT VFR BLD AUTO: 31.7 % (ref 40–53)
HGB BLD-MCNC: 8.9 G/DL (ref 13.3–17.7)
HGB BLD-MCNC: 9.3 G/DL (ref 13.3–17.7)
MCH RBC QN AUTO: 28.1 PG (ref 26.5–33)
MCHC RBC AUTO-ENTMCNC: 29.3 G/DL (ref 31.5–36.5)
MCV RBC AUTO: 96 FL (ref 78–100)
OXYHGB MFR BLD: 87 % (ref 92–100)
PCO2 BLD: 37 MM HG (ref 35–45)
PH BLD: 7.49 PH (ref 7.35–7.45)
PLATELET # BLD AUTO: 226 10E9/L (ref 150–450)
PO2 BLD: 50 MM HG (ref 80–105)
POTASSIUM SERPL-SCNC: 3.7 MMOL/L (ref 3.4–5.3)
RBC # BLD AUTO: 3.31 10E12/L (ref 4.4–5.9)
SODIUM SERPL-SCNC: 150 MMOL/L (ref 133–144)
TRANSFUSION STATUS PATIENT QL: NORMAL
TRANSFUSION STATUS PATIENT QL: NORMAL
WBC # BLD AUTO: 10.9 10E9/L (ref 4–11)

## 2021-05-29 PROCEDURE — 250N000011 HC RX IP 250 OP 636: Performed by: INTERNAL MEDICINE

## 2021-05-29 PROCEDURE — 0DJD8ZZ INSPECTION OF LOWER INTESTINAL TRACT, VIA NATURAL OR ARTIFICIAL OPENING ENDOSCOPIC: ICD-10-PCS | Performed by: INTERNAL MEDICINE

## 2021-05-29 PROCEDURE — 85027 COMPLETE CBC AUTOMATED: CPT | Performed by: HOSPITALIST

## 2021-05-29 PROCEDURE — G0500 MOD SEDAT ENDO SERVICE >5YRS: HCPCS | Performed by: INTERNAL MEDICINE

## 2021-05-29 PROCEDURE — 36415 COLL VENOUS BLD VENIPUNCTURE: CPT | Performed by: HOSPITALIST

## 2021-05-29 PROCEDURE — 80048 BASIC METABOLIC PNL TOTAL CA: CPT | Performed by: INTERNAL MEDICINE

## 2021-05-29 PROCEDURE — 36600 WITHDRAWAL OF ARTERIAL BLOOD: CPT

## 2021-05-29 PROCEDURE — 82805 BLOOD GASES W/O2 SATURATION: CPT | Performed by: HOSPITALIST

## 2021-05-29 PROCEDURE — 99232 SBSQ HOSP IP/OBS MODERATE 35: CPT | Performed by: INTERNAL MEDICINE

## 2021-05-29 PROCEDURE — 120N000001 HC R&B MED SURG/OB

## 2021-05-29 PROCEDURE — 36415 COLL VENOUS BLD VENIPUNCTURE: CPT | Performed by: INTERNAL MEDICINE

## 2021-05-29 PROCEDURE — 71045 X-RAY EXAM CHEST 1 VIEW: CPT

## 2021-05-29 PROCEDURE — 80048 BASIC METABOLIC PNL TOTAL CA: CPT | Performed by: HOSPITALIST

## 2021-05-29 PROCEDURE — 250N000013 HC RX MED GY IP 250 OP 250 PS 637: Performed by: HOSPITALIST

## 2021-05-29 PROCEDURE — 85018 HEMOGLOBIN: CPT | Performed by: INTERNAL MEDICINE

## 2021-05-29 PROCEDURE — 45378 DIAGNOSTIC COLONOSCOPY: CPT | Performed by: INTERNAL MEDICINE

## 2021-05-29 PROCEDURE — 120N000013 HC R&B IMCU

## 2021-05-29 PROCEDURE — 250N000013 HC RX MED GY IP 250 OP 250 PS 637: Performed by: PHYSICIAN ASSISTANT

## 2021-05-29 PROCEDURE — 250N000013 HC RX MED GY IP 250 OP 250 PS 637: Performed by: INTERNAL MEDICINE

## 2021-05-29 RX ORDER — NALOXONE HYDROCHLORIDE 0.4 MG/ML
0.4 INJECTION, SOLUTION INTRAMUSCULAR; INTRAVENOUS; SUBCUTANEOUS
Status: DISCONTINUED | OUTPATIENT
Start: 2021-05-29 | End: 2021-06-03 | Stop reason: HOSPADM

## 2021-05-29 RX ORDER — NALOXONE HYDROCHLORIDE 0.4 MG/ML
0.2 INJECTION, SOLUTION INTRAMUSCULAR; INTRAVENOUS; SUBCUTANEOUS
Status: DISCONTINUED | OUTPATIENT
Start: 2021-05-29 | End: 2021-06-03 | Stop reason: HOSPADM

## 2021-05-29 RX ORDER — FLUMAZENIL 0.1 MG/ML
0.2 INJECTION, SOLUTION INTRAVENOUS
Status: ACTIVE | OUTPATIENT
Start: 2021-05-29 | End: 2021-05-29

## 2021-05-29 RX ORDER — FUROSEMIDE 10 MG/ML
20 INJECTION INTRAMUSCULAR; INTRAVENOUS ONCE
Status: COMPLETED | OUTPATIENT
Start: 2021-05-29 | End: 2021-05-29

## 2021-05-29 RX ADMIN — PANTOPRAZOLE SODIUM 40 MG: 40 TABLET, DELAYED RELEASE ORAL at 11:20

## 2021-05-29 RX ADMIN — ATORVASTATIN CALCIUM 10 MG: 10 TABLET, FILM COATED ORAL at 11:20

## 2021-05-29 RX ADMIN — Medication 500 MCG: at 11:20

## 2021-05-29 RX ADMIN — METOPROLOL TARTRATE 12.5 MG: 25 TABLET, FILM COATED ORAL at 08:24

## 2021-05-29 RX ADMIN — IPRATROPIUM BROMIDE AND ALBUTEROL 1 PUFF: 20; 100 SPRAY, METERED RESPIRATORY (INHALATION) at 08:22

## 2021-05-29 RX ADMIN — MIDAZOLAM 1 MG: 1 INJECTION INTRAMUSCULAR; INTRAVENOUS at 09:19

## 2021-05-29 RX ADMIN — FUROSEMIDE 20 MG: 10 INJECTION, SOLUTION INTRAVENOUS at 17:16

## 2021-05-29 RX ADMIN — IPRATROPIUM BROMIDE AND ALBUTEROL 1 PUFF: 20; 100 SPRAY, METERED RESPIRATORY (INHALATION) at 22:27

## 2021-05-29 RX ADMIN — MULTIPLE VITAMINS W/ MINERALS TAB 1 TABLET: TAB at 11:20

## 2021-05-29 RX ADMIN — PAROXETINE HYDROCHLORIDE 20 MG: 20 TABLET, FILM COATED ORAL at 11:20

## 2021-05-29 RX ADMIN — METOPROLOL TARTRATE 12.5 MG: 25 TABLET, FILM COATED ORAL at 20:08

## 2021-05-29 RX ADMIN — IPRATROPIUM BROMIDE AND ALBUTEROL 1 PUFF: 20; 100 SPRAY, METERED RESPIRATORY (INHALATION) at 13:41

## 2021-05-29 RX ADMIN — ALLOPURINOL 300 MG: 300 TABLET ORAL at 11:20

## 2021-05-29 RX ADMIN — PANTOPRAZOLE SODIUM 40 MG: 40 TABLET, DELAYED RELEASE ORAL at 17:16

## 2021-05-29 RX ADMIN — IPRATROPIUM BROMIDE AND ALBUTEROL 1 PUFF: 20; 100 SPRAY, METERED RESPIRATORY (INHALATION) at 17:35

## 2021-05-29 ASSESSMENT — ACTIVITIES OF DAILY LIVING (ADL)
ADLS_ACUITY_SCORE: 25

## 2021-05-29 NOTE — PLAN OF CARE
A&O x. VSS on 4L O2 NC. Tele NSR. Up with lift, L sided weakness- baseline. LS diminished, IS encouraged. BS+.   Incontinent of bowel, one loose very dark stool. Rectal tube placed for bowel prep, which he is working on- nectar thickened.   Cardona catheter in place  R groin puncture site CDI.      1 unit blood transfused for Hgb 6.9, recheck at 1632 was increased to 8.4  K+ replaced, recheck scheduled tonight.

## 2021-05-29 NOTE — PROGRESS NOTES
[facility]  Gastroenterology Progress Note     Ron Gilmore MRN# [id number]   YOB: 1940 Age: 80 year old          Assessment and Plan:     Acute and chronic respiratory failure with hypoxia (H)  Patient did well overnight patient continues to require 4 to 6 L of nasal cannula oxygen patient sats are in the low 90s patient has COPD and respiratory failure patient also with recent diagnosis of Covid.  Patient dropped hemoglobin upper GI endoscopy was fairly unremarkable yesterday.  Colonoscopy done this morning was also unremarkable 2 small polyps noticed 1 in cecum one in transverse colon each about 3 to 4 mm those were not removed.  Sigmoid diverticulosis and internal hemorrhoids also seen no signs of active bleeding.  I will recommend to start him on regular diet.  Continue on current treatment plan.         Acute and chronic respiratory failure with hypoxia (H)      Interval History:   doing well; no cp, sob, n/v/d, or abd pain.              Review of Systems:   C: NEGATIVE for fever, chills, change in weight  E/M: NEGATIVE for ear, mouth and throat problems  R: NEGATIVE for significant cough or SOB  CV: NEGATIVE for chest pain, palpitations or peripheral edema             Medications:   I have reviewed this patient's current medications    allopurinol  300 mg Oral Daily     [Held by provider] aspirin  81 mg Oral Daily     atorvastatin  10 mg Oral Daily     cyanocobalamin  500 mcg Sublingual Daily     [Held by provider] enoxaparin ANTICOAGULANT  1 mg/kg Subcutaneous Q12H     [Held by provider] furosemide  40 mg Oral Daily     ipratropium-albuterol  1 puff Inhalation 4x Daily     metoprolol tartrate  12.5 mg Oral BID     multivitamin w/minerals  1 tablet Oral Daily     pantoprazole  40 mg Oral BID AC     PARoxetine  20 mg Oral Daily     polyethylene glycol  17 g Oral Daily     sodium chloride (PF)  3 mL Intracatheter Q8H                  Physical Exam:   Vitals were  reviewed  Vital Signs with Ranges  Temp:  [97.7  F (36.5  C)-98.4  F (36.9  C)] 97.8  F (36.6  C)  Pulse:  [73-94] 94  Resp:  [16-20] 16  BP: ()/() 136/84  SpO2:  [91 %-100 %] 94 %  I/O last 3 completed shifts:  In: 2500 [P.O.:1500]  Out: 850 [Urine:850]  Constitutional: healthy, alert and no distress   Cardiovascular: negative, PMI normal. No lifts, heaves, or thrills. RRR. No murmurs, clicks gallops or rub  Respiratory: negative, Percussion normal. Good diaphragmatic excursion. Lungs clear  Head: Normocephalic. No masses, lesions, tenderness or abnormalities  Neck: Neck supple. No adenopathy. Thyroid symmetric, normal size,, Carotids without bruits.  Abdomen: Abdomen soft, non-tender. BS normal. No masses, organomegaly  SKIN: no suspicious lesions or rashes             Data:   I reviewed the patient's new clinical lab test results.   Recent Labs   Lab Test 05/29/21  0647 05/28/21  1632 05/28/21  0655 05/27/21  0611 05/27/21  0611 05/26/21  0637 05/25/21  0459 05/25/21  0459 03/12/21  1128 03/12/21  1128 03/10/21  1420 03/10/21  1420 01/14/20  2356 01/14/20  2356   WBC  --   --   --   --  8.3 7.0  --  7.9   < > 14.1*   < >  --    < > 8.7   HGB 8.9* 8.4* 6.9*   < > 7.3* 8.0*   < > 7.8*   < > 11.5*   < >  --    < > 13.4   MCV  --   --   --   --  98 98  --  96   < > 92   < >  --    < > 93   PLT  --   --  211  --  223 258  --  269   < > 21*   < >  --    < > 184   INR  --   --   --   --   --   --   --   --   --  1.20*  --  1.58*  --  1.13    < > = values in this interval not displayed.     Recent Labs   Lab Test 05/29/21  0647 05/28/21  2243 05/28/21  0655 05/27/21  0611 05/27/21  0611   POTASSIUM 3.7 3.8 3.3*   < > 3.4   CHLORIDE 115*  --  116*  --  116*   CO2 30  --  32  --  35*   BUN 15  --  18  --  26   ANIONGAP 5  --  2*  --  <1*    < > = values in this interval not displayed.     Recent Labs   Lab Test 05/18/21  2137 05/11/21  0117 05/10/21  2110 05/01/21  1715 05/01/21  1558 03/10/21  0621   ALBUMIN   --   --  2.0*  --  2.5* 1.7*   BILITOTAL  --   --  0.5  --  0.3 1.2   ALT  --   --  37  --  24 25   AST  --   --  35  --  29 51*   PROTEIN 10* 30*  --  50*  --   --        I reviewed the patient's new imaging results.    All laboratory data reviewed  All imaging studies reviewed by me.    Kofi Davis MD,  5/29/2021  [facility]  Office : [phone]  Cell: [phone]

## 2021-05-29 NOTE — PLAN OF CARE
Vital signs stable on 5 L nasal cannula. Alert and oriented. Lung sounds diminished with expiratory wheezes. Bowel sounds active, flatus present. Rectal tube in place due to incontinence/bowel prep (completed). Patient denies pain. Up with lift. Tolerating nectar thick liquids. CMS/neuros intact except out on left side form previous CVA. Turn and repo q2h. Mepilex on coccyx for reddened area.

## 2021-05-29 NOTE — PROGRESS NOTES
[facility]    Medicine Progress Note - Hospitalist Service       Date of Admission:  5/18/2021  Assessment & Plan         Ron Gilmore is a 80 year old male admitted on 5/18/2021.  Complex past medical history of CVA with residual left-sided weakness, COPD, chronic urinary retention with chronic indwelling Cardona catheter, history of septic shock secondary to E. 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  Also history of dyslipidemia, depression, diabetes mellitus type 2 diet-controlled and dysphagia and recent COVID-19 infection and recent admission to [facility] from 05/10 to 05/17 with acute diastolic congestive heart failure, hypoxic respiratory failure, possible pneumonia.  He is re admitted on 5/18/2021 with fever and possible worsening of oxygenation.     Acute on chronic hypoxic respiratory failure  SIRS  Suspected pulmonary embolism s/p IVC filter 5/24  Pulmonary infiltrates   Chronic obstructive lung disease/emphysema  Sleep disordered breathing   Recent COVID-19 infection/COVID-recovered  Right peroneal vein DVT  CT negative for pulmonary embolism in main arteries but segmental or smaller not ruled out due to motion artifact.  D-dimer 1.7. Lungs showed emphysema and patchy lower lobe infiltrates but small.  No pulmonary edema.  Pulmonary embolism is still a concern especially with the right calf deep venous thrombosis. CT does show emphysema and bilateral lower lobe infiltrates.  He was initially febrile but after starting antibiotics, he has no more fever.  No leukocytosis and pro-calcitonin is undetectable.  * weaned from HFNC 5/22, now down to 4L NC which appears to be level he was discharged from the hospital during his recent admission.   * lovenox held 5/23 due to probable GIB and underwent IVC filter placement  5/24  - Completed 10 days of zosyn on 5/28  - oxygen weaned down to 4L NC on 5/27 which appears to be baseline (O2 increased to 6Lovernight, although no new complaints), per report O2 decreased to high 80s. Wean oxygen today  - Holding lasix at this time-hypernatremia, poor oral intake, plan to resume once Colonoscopy completed and able to eat  - Resume anticoagulation once safe from GI perspective--holding at this time-see below  - Continue inhaled bronchodilators   - Aggressive pulm toilet with flutter valve, refuses IS use  - Up in a chair as much as possible    Acute on chronic anemia due to probable upper GIB  Hgb 3/2021 wnl but fell to about 10 g/dL following hospitalization that month and stable at admission, but trended down over several days with RN noting melanic stool 5/23.  Hx colon polyps on colonoscopy in 2006, no prior hx GIB.   *GI followed; initially EGD held due to resp status, then he declined EGD. Due to decreasing hgb, GI recosult on 5/28 and EGD was completed which was normal finding.     - plan for colonoscopy today  - received 1unit PRBC 5/24 for hgb 6.6 and another unit on 5/28 for hgb of 6.9  - venofer x 2 doses  - hgb 8.9 ttoday  - continue PPI PO BID  - hold aspirin and lovenox  -  anticipate will be able to resume ASA once above work up completed      AIDA  Likely combination of diuresis for resp failure and GIB.  Baseline Cr 0.7.  Admission Cr 1.3 initially improved to 1.0 but subsequently trended up again.  - Cr 1.25 today  - avoid nephrotoxins  - monitor off IVF    Hypernatremia  Due to poor oral intake and possible component of IVF  - Na peaked at 158 on 5/26, improved to 151 after D5W  - stop D5W on 5/27  - Na remains at 150  - encourage PO intake after procedure  - f/u Na level in AM    Hypokalemia  - K replacement protocol    Chronic indwelling urinary catheter  Urine culture growing Candida, as currently afebrile with negative blood cultures will not treat.     Continue  catheter      Hypertension   Intermittent atrial fibrillation not on anticoagulation   Chronic diastolic congestive heart failure   Appears compensated.  BNP down from 4500 at recent discharge to 800.  No pulmonary edema on CT scan.  Was not previously on anticoagulation for A-fib.   - continue with Metoprolol BID  - Hold aspirin and lasix as above      Type 2 diabetes mellitus     Stable, continue dietary control      Depression     Continue paroxetine      Stroke history   He was not on anticoagulation for atrial fibrillation but is currently being anticoagulated for the deep venous thrombosis.  Wheelchair bound at baseline.  - Continue statin; aspirin held     Severe malnutrition  In the context of acute illness.Chronic illness or disease.   - nutrition following    ADDENDUM-412PM  Discussed with RN, needing about 6L oxygen today although he does not complain of increasing shortness of breath.   -- will give Lasix 20mg IV x 1   -- discussed with Dr. Davis earlier, colonoscopy did not reveal source of bleed, possible OP capsule endoscopy  -- updated DaughterLópez, by phone.     Diet: Snacks/Supplements Adult: Other; Chocolate Vital Cuisine (UNOPENED - COLD); With Meals  Clear Liquid Diet Nectar Thickened Liquids (pre-thickened or use instant food thickener)    DVT Prophylaxis: Pneumatic Compression Devices  Cardona Catheter: in place, indication: Retention  Code Status: Full Code           Disposition Plan   Expected discharge: 1-2 days, recommended to transitional care unit once pending stable Na level, plan for colonscopy today. Resp status currently stable.  Entered: Tien Gimenez MD 05/29/2021, 7:59 AM       The patient's care was discussed with the Bedside Nurse and Patient.    Attempted calling [employer], but no answer,went to . Did not leave a message, will try to call back as able.    Tien Gimenez MD  Hospitalist Service  [facility]  Contact information  available via [facility] Paging/Directory    ______________________________________________________________________    Interval History   Patient states his breathing is good and his normal. Denies feeling shortness of breath. Cough is much improved and minimal.  States he felt some palpitation overnight around 3am which resolved. No arrhythmia noted on tele.   Plan for colonoscopy today.  hgb 8.9.      Data reviewed today: I reviewed all medications, new labs and imaging results over the last 24 hours. I personally reviewed no images or EKG's today.    Physical Exam   Vital Signs: Temp: 97.8  F (36.6  C) Temp src: Oral BP: 113/73 Pulse: 89   Resp: 18 SpO2: 92 % O2 Device: Nasal cannula Oxygen Delivery: 6 LPM  Weight: 218 lbs 0 oz  General Appearance: Alert, awake and appropriate  Respiratory: diminished breath sounds at the bases,  upper lung fields are clear to auscultation  Cardiovascular: regular rate and rhythm  GI: soft and non-tender  Skin: warm and dry      Data   Recent Labs   Lab 05/29/21  0647 05/28/21  2243 05/28/21  1632 05/28/21  0655 05/27/21  1146 05/27/21  1146 05/27/21  0611 05/26/21  0637 05/26/21  0637 05/25/21  0459 05/25/21  0459   WBC  --   --   --   --   --   --  8.3  --  7.0  --  7.9   HGB 8.9*  --  8.4* 6.9*   < >  --  7.3*  --  8.0*   < > 7.8*   MCV  --   --   --   --   --   --  98  --  98  --  96   PLT  --   --   --  211  --   --  223  --  258  --  269   *  --   --  150*  --  150* 151*   < > 158*  --  153*   POTASSIUM 3.7 3.8  --  3.3*   < >  --  3.4   < > 3.1*  --  3.5   CHLORIDE 115*  --   --  116*  --   --  116*  --  119*  --  114*   CO2 30  --   --  32  --   --  35*  --  36*  --  36*   BUN 15  --   --  18  --   --  26  --  33*  --  40*   CR 1.25  --   --  1.35*  --   --  1.30*  --  1.40*  --  1.46*   ANIONGAP 5  --   --  2*  --   --  <1*  --  3  --  3   RAJ 8.4*  --   --  8.3*  --   --  8.3*  --  8.9  --  9.0   GLC 95  --   --  97  --   --  120*  --  107*  --  115*    < > = values in  this interval not displayed.     No results found for this or any previous visit (from the past 24 hour(s)).  Medications       allopurinol  300 mg Oral Daily     [Held by provider] aspirin  81 mg Oral Daily     atorvastatin  10 mg Oral Daily     cyanocobalamin  500 mcg Sublingual Daily     [Held by provider] enoxaparin ANTICOAGULANT  1 mg/kg Subcutaneous Q12H     [Held by provider] furosemide  40 mg Oral Daily     ipratropium-albuterol  1 puff Inhalation 4x Daily     metoprolol tartrate  12.5 mg Oral BID     multivitamin w/minerals  1 tablet Oral Daily     pantoprazole  40 mg Oral BID AC     PARoxetine  20 mg Oral Daily     polyethylene glycol  17 g Oral Daily     sodium chloride (PF)  3 mL Intracatheter Q8H

## 2021-05-30 LAB
ALBUMIN SERPL-MCNC: 1.8 G/DL (ref 3.4–5)
ALBUMIN UR-MCNC: 30 MG/DL
ALP SERPL-CCNC: 67 U/L (ref 40–150)
ALT SERPL W P-5'-P-CCNC: 12 U/L (ref 0–70)
ANION GAP SERPL CALCULATED.3IONS-SCNC: 3 MMOL/L (ref 3–14)
ANION GAP SERPL CALCULATED.3IONS-SCNC: 6 MMOL/L (ref 3–14)
APPEARANCE UR: ABNORMAL
AST SERPL W P-5'-P-CCNC: 19 U/L (ref 0–45)
BASE EXCESS BLDA CALC-SCNC: 4.4 MMOL/L
BASE EXCESS BLDA CALC-SCNC: 4.8 MMOL/L
BILIRUB DIRECT SERPL-MCNC: 0.2 MG/DL (ref 0–0.2)
BILIRUB SERPL-MCNC: 1.7 MG/DL (ref 0.2–1.3)
BILIRUB UR QL STRIP: NEGATIVE
BUN SERPL-MCNC: 15 MG/DL (ref 7–30)
BUN SERPL-MCNC: 16 MG/DL (ref 7–30)
CALCIUM SERPL-MCNC: 8.3 MG/DL (ref 8.5–10.1)
CALCIUM SERPL-MCNC: 8.4 MG/DL (ref 8.5–10.1)
CHLORIDE SERPL-SCNC: 114 MMOL/L (ref 94–109)
CHLORIDE SERPL-SCNC: 114 MMOL/L (ref 94–109)
CO2 SERPL-SCNC: 28 MMOL/L (ref 20–32)
CO2 SERPL-SCNC: 31 MMOL/L (ref 20–32)
COLOR UR AUTO: ABNORMAL
CREAT SERPL-MCNC: 1.32 MG/DL (ref 0.66–1.25)
CREAT SERPL-MCNC: 1.51 MG/DL (ref 0.66–1.25)
GFR SERPL CREATININE-BSD FRML MDRD: 43 ML/MIN/{1.73_M2}
GFR SERPL CREATININE-BSD FRML MDRD: 51 ML/MIN/{1.73_M2}
GLUCOSE SERPL-MCNC: 100 MG/DL (ref 70–99)
GLUCOSE SERPL-MCNC: 104 MG/DL (ref 70–99)
GLUCOSE UR STRIP-MCNC: NEGATIVE MG/DL
HCO3 BLD-SCNC: 30 MMOL/L (ref 21–28)
HCO3 BLD-SCNC: 30 MMOL/L (ref 21–28)
HGB BLD-MCNC: 8.7 G/DL (ref 13.3–17.7)
HGB UR QL STRIP: ABNORMAL
KETONES UR STRIP-MCNC: NEGATIVE MG/DL
LACTATE BLD-SCNC: 1.2 MMOL/L (ref 0.7–2)
LEUKOCYTE ESTERASE UR QL STRIP: ABNORMAL
MUCOUS THREADS #/AREA URNS LPF: PRESENT /LPF
NITRATE UR QL: NEGATIVE
O2/TOTAL GAS SETTING VFR VENT: ABNORMAL %
OXYHGB MFR BLD: 92 % (ref 92–100)
OXYHGB MFR BLD: 98 % (ref 92–100)
PCO2 BLD: 48 MM HG (ref 35–45)
PCO2 BLD: 49 MM HG (ref 35–45)
PH BLD: 7.39 PH (ref 7.35–7.45)
PH BLD: 7.41 PH (ref 7.35–7.45)
PH UR STRIP: 6 PH (ref 5–7)
PO2 BLD: 132 MM HG (ref 80–105)
PO2 BLD: 67 MM HG (ref 80–105)
POTASSIUM SERPL-SCNC: 3.2 MMOL/L (ref 3.4–5.3)
POTASSIUM SERPL-SCNC: 3.3 MMOL/L (ref 3.4–5.3)
POTASSIUM SERPL-SCNC: 4.8 MMOL/L (ref 3.4–5.3)
PROT SERPL-MCNC: 6.2 G/DL (ref 6.8–8.8)
RBC #/AREA URNS AUTO: 91 /HPF (ref 0–2)
SODIUM SERPL-SCNC: 148 MMOL/L (ref 133–144)
SODIUM SERPL-SCNC: 148 MMOL/L (ref 133–144)
SOURCE: ABNORMAL
SP GR UR STRIP: 1.02 (ref 1–1.03)
SQUAMOUS #/AREA URNS AUTO: 0 /HPF (ref 0–1)
UROBILINOGEN UR STRIP-MCNC: 0 MG/DL (ref 0–2)
WBC #/AREA URNS AUTO: >182 /HPF (ref 0–5)
WBC CLUMPS #/AREA URNS HPF: PRESENT /HPF
YEAST #/AREA URNS HPF: ABNORMAL /HPF

## 2021-05-30 PROCEDURE — 250N000011 HC RX IP 250 OP 636: Performed by: INTERNAL MEDICINE

## 2021-05-30 PROCEDURE — 83605 ASSAY OF LACTIC ACID: CPT | Performed by: INTERNAL MEDICINE

## 2021-05-30 PROCEDURE — 84132 ASSAY OF SERUM POTASSIUM: CPT | Performed by: INTERNAL MEDICINE

## 2021-05-30 PROCEDURE — 80048 BASIC METABOLIC PNL TOTAL CA: CPT | Performed by: INTERNAL MEDICINE

## 2021-05-30 PROCEDURE — 85018 HEMOGLOBIN: CPT | Performed by: INTERNAL MEDICINE

## 2021-05-30 PROCEDURE — 36415 COLL VENOUS BLD VENIPUNCTURE: CPT | Performed by: INTERNAL MEDICINE

## 2021-05-30 PROCEDURE — 999N000157 HC STATISTIC RCP TIME EA 10 MIN

## 2021-05-30 PROCEDURE — 81001 URINALYSIS AUTO W/SCOPE: CPT | Performed by: INTERNAL MEDICINE

## 2021-05-30 PROCEDURE — 250N000013 HC RX MED GY IP 250 OP 250 PS 637: Performed by: INTERNAL MEDICINE

## 2021-05-30 PROCEDURE — 82805 BLOOD GASES W/O2 SATURATION: CPT | Performed by: INTERNAL MEDICINE

## 2021-05-30 PROCEDURE — 250N000013 HC RX MED GY IP 250 OP 250 PS 637: Performed by: HOSPITALIST

## 2021-05-30 PROCEDURE — 250N000013 HC RX MED GY IP 250 OP 250 PS 637: Performed by: PHYSICIAN ASSISTANT

## 2021-05-30 PROCEDURE — 80076 HEPATIC FUNCTION PANEL: CPT | Performed by: INTERNAL MEDICINE

## 2021-05-30 PROCEDURE — 120N000001 HC R&B MED SURG/OB

## 2021-05-30 PROCEDURE — 87106 FUNGI IDENTIFICATION YEAST: CPT | Performed by: INTERNAL MEDICINE

## 2021-05-30 PROCEDURE — 258N000002 HC RX IP 258 OP 250: Performed by: INTERNAL MEDICINE

## 2021-05-30 PROCEDURE — 87086 URINE CULTURE/COLONY COUNT: CPT | Performed by: INTERNAL MEDICINE

## 2021-05-30 PROCEDURE — 120N000013 HC R&B IMCU

## 2021-05-30 PROCEDURE — 99233 SBSQ HOSP IP/OBS HIGH 50: CPT | Performed by: INTERNAL MEDICINE

## 2021-05-30 RX ORDER — POTASSIUM CHLORIDE 1.5 G/1.58G
20 POWDER, FOR SOLUTION ORAL ONCE
Status: COMPLETED | OUTPATIENT
Start: 2021-05-30 | End: 2021-05-30

## 2021-05-30 RX ORDER — POTASSIUM CHLORIDE 1.5 G/1.58G
40 POWDER, FOR SOLUTION ORAL ONCE
Status: COMPLETED | OUTPATIENT
Start: 2021-05-30 | End: 2021-05-30

## 2021-05-30 RX ORDER — CEFTRIAXONE 1 G/1
1 INJECTION, POWDER, FOR SOLUTION INTRAMUSCULAR; INTRAVENOUS EVERY 24 HOURS
Status: DISCONTINUED | OUTPATIENT
Start: 2021-05-30 | End: 2021-06-02

## 2021-05-30 RX ORDER — SODIUM CHLORIDE 450 MG/100ML
INJECTION, SOLUTION INTRAVENOUS CONTINUOUS
Status: ACTIVE | OUTPATIENT
Start: 2021-05-30 | End: 2021-05-30

## 2021-05-30 RX ORDER — FUROSEMIDE 10 MG/ML
20 INJECTION INTRAMUSCULAR; INTRAVENOUS ONCE
Status: DISCONTINUED | OUTPATIENT
Start: 2021-05-30 | End: 2021-05-30

## 2021-05-30 RX ADMIN — CEFTRIAXONE SODIUM 1 G: 1 INJECTION, POWDER, FOR SOLUTION INTRAMUSCULAR; INTRAVENOUS at 14:28

## 2021-05-30 RX ADMIN — METOPROLOL TARTRATE 12.5 MG: 25 TABLET, FILM COATED ORAL at 11:05

## 2021-05-30 RX ADMIN — Medication 500 MCG: at 11:05

## 2021-05-30 RX ADMIN — SODIUM CHLORIDE: 4.5 INJECTION, SOLUTION INTRAVENOUS at 11:42

## 2021-05-30 RX ADMIN — METOPROLOL TARTRATE 12.5 MG: 25 TABLET, FILM COATED ORAL at 20:56

## 2021-05-30 RX ADMIN — PAROXETINE HYDROCHLORIDE 20 MG: 20 TABLET, FILM COATED ORAL at 11:06

## 2021-05-30 RX ADMIN — MULTIPLE VITAMINS W/ MINERALS TAB 1 TABLET: TAB at 11:05

## 2021-05-30 RX ADMIN — ACETAMINOPHEN 650 MG: 325 TABLET, FILM COATED ORAL at 01:12

## 2021-05-30 RX ADMIN — IPRATROPIUM BROMIDE AND ALBUTEROL 1 PUFF: 20; 100 SPRAY, METERED RESPIRATORY (INHALATION) at 21:01

## 2021-05-30 RX ADMIN — IPRATROPIUM BROMIDE AND ALBUTEROL 1 PUFF: 20; 100 SPRAY, METERED RESPIRATORY (INHALATION) at 14:28

## 2021-05-30 RX ADMIN — IPRATROPIUM BROMIDE AND ALBUTEROL 1 PUFF: 20; 100 SPRAY, METERED RESPIRATORY (INHALATION) at 11:11

## 2021-05-30 RX ADMIN — POTASSIUM CHLORIDE 40 MEQ: 1.5 POWDER, FOR SOLUTION ORAL at 11:06

## 2021-05-30 RX ADMIN — POLYETHYLENE GLYCOL 3350 17 G: 17 POWDER, FOR SOLUTION ORAL at 11:06

## 2021-05-30 RX ADMIN — PANTOPRAZOLE SODIUM 40 MG: 40 TABLET, DELAYED RELEASE ORAL at 17:23

## 2021-05-30 RX ADMIN — POTASSIUM CHLORIDE 20 MEQ: 1.5 POWDER, FOR SOLUTION ORAL at 01:05

## 2021-05-30 RX ADMIN — PANTOPRAZOLE SODIUM 40 MG: 40 TABLET, DELAYED RELEASE ORAL at 11:05

## 2021-05-30 RX ADMIN — ATORVASTATIN CALCIUM 10 MG: 10 TABLET, FILM COATED ORAL at 11:05

## 2021-05-30 RX ADMIN — IPRATROPIUM BROMIDE AND ALBUTEROL 1 PUFF: 20; 100 SPRAY, METERED RESPIRATORY (INHALATION) at 17:32

## 2021-05-30 RX ADMIN — ASPIRIN 81 MG: 81 TABLET, COATED ORAL at 11:05

## 2021-05-30 RX ADMIN — ALLOPURINOL 300 MG: 300 TABLET ORAL at 11:05

## 2021-05-30 ASSESSMENT — ACTIVITIES OF DAILY LIVING (ADL)
ADLS_ACUITY_SCORE: 25

## 2021-05-30 NOTE — PROGRESS NOTES
Dr. Davey paged regarding increased confusion/disorientation of patient. Lab work and CT ordered. Dr. Davey updated of results, MD will come assess.

## 2021-05-30 NOTE — PROGRESS NOTES
I was paged earlier this evening for confusion that seemed to start this evening. I ordered repeat CBC and BMP which seemed relatively unchanged and ABG which was notable for pH 7.49 and pCO2 37. CXR showed no clearly acute pathology. I assessed and examined him at the bedside and he appears a and o times 3 but nursing staff report occasional confusion and need for re-direction. He is breathing comfortably. He could have acute mild multi-factorial delirium. There are no sign currently of acute infection but we must continue to monitor him very closely overnight.

## 2021-05-30 NOTE — PLAN OF CARE
A&O x2, disoriented to situation and place. VSS on 6L ex temp of 100.9, improved w/ Tylenol. Tele SR w/ BBB and occasional PVCs. Lung sounds course. Nectar thin clear liquid diet. Bowel sounds active + flatus. Adequate urine output per Cardona. Denies pain. Up with lift. CMS intact.

## 2021-05-30 NOTE — PLAN OF CARE
"Alert, was oriented this morning and partially oriented this afternoon... knows where he is, the year etc but this afternoon increasingly stating illogical things, started getting upset saying he wanted to go home, and that he was \"kidnapped\". Reassurance and reorientation provided. Started getting frustrated and refused pulse ox, refused to get up in the chair.   Did sit up in the chair once today. Turn/repo  Per report- colonoscopy had no significant findings.  VSS EX requiring 6L O2 NC or oxymas, sats still bounce around 85%-91%, IS, coughing encouraged. Performed IS once today otherwise refused.   Denies SOB. Lungs course and diminished.  BS+  Cardona catheter in place. Denies pain.  "

## 2021-05-30 NOTE — PROGRESS NOTES
[facility]    Medicine Progress Note - Hospitalist Service       Date of Admission:  5/18/2021  Assessment & Plan         Ron Gilmore is a 80 year old male admitted on 5/18/2021.  Complex past medical history of CVA with residual left-sided weakness, COPD, chronic urinary retention with chronic indwelling Cardona catheter, history of septic shock secondary to E. 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  Also history of dyslipidemia, depression, diabetes mellitus type 2 diet-controlled and dysphagia and recent COVID-19 infection and recent admission to [facility] from 05/10 to 05/17 with acute diastolic congestive heart failure, hypoxic respiratory failure, possible pneumonia.  He is re admitted on 5/18/2021 with fever and possible worsening of oxygenation.     Acute on chronic hypoxic respiratory failure  SIRS  Suspected pulmonary embolism s/p IVC filter 5/24  Pulmonary infiltrates   Chronic obstructive lung disease/emphysema  Sleep disordered breathing   Recent COVID-19 infection/COVID-recovered  Right peroneal vein DVT  CT negative for pulmonary embolism in main arteries but segmental or smaller not ruled out due to motion artifact.  D-dimer 1.7. Lungs showed emphysema and patchy lower lobe infiltrates but small.  No pulmonary edema.  Pulmonary embolism is still a concern especially with the right calf deep venous thrombosis. CT does show emphysema and bilateral lower lobe infiltrates.  He was initially febrile but after starting antibiotics, he has no more fever.  No leukocytosis and pro-calcitonin is undetectable.  * weaned from HFNC 5/22  * lovenox held 5/23 due to probable GIB and underwent IVC filter placement 5/24  *oxygen weaned down to 4L NC on 5/27 which appears to be level he was discharged from the hospital during his  recent admission.   * Completed 10 days of zosyn on 5/28 for probable pneumonia  * Oxygen needs have increased as of 5/29 upto 6L following colonscopyprocedure and confusion noted.    - s/p IV Lasix 20mgIV x 1 on 5/29  * ABG 5/29 evening pO2 50, pCO2 37.  Recheck on 5/30am, pH 7.41, pCO2 48, pO2 67 on 6L NC  - CXR last night   - CXR-low lung volumes, mild interstitial prominence   - discussed with respiratory therapy, will do Oxymizer vs HFNC today, then repeat ABG in 2-3 hours  - continue to hold Lovenox for now, resume Lovenox daily  - Continue inhaled bronchodilators   - Aggressive pulm toilet with flutter valve  - Up in a chair as much as possible    Acute metabolic encephalopathy  Patient has been confused since late afternoon of 5/29.  Seems to be oriented to place and time at times, then talks something noon-sense. This could be due to hypoxia as above vs delirium vs other  - managing hypoxia as above  - has chronic indwelling chirinos, will check urinalysis, of note he had temp of 100.9 last evening  - up in a chair, frequent orientation    ADDENDUM:  UA is grossly abnormal. Obtained after chirinos cathter changed. UCx on admission only +for candidia  - start Ceftriaxone pending culture  - reviewed ABG post HFNC and improved hypoxemia, RT titrating O2    Acute on chronic anemia due to probable upper GIB  Hgb 3/2021 wnl but fell to about 10 g/dL following hospitalization that month and stable at admission, but trended down over several days with RN noting melanic stool 5/23.  Hx colon polyps on colonoscopy in 2006, no prior hx GIB.   *GI followed; initially EGD held due to resp status, then he declined EGD once resp status improved. Due to decreasing hgb, GI recosult on 5/28 and EGD was completed which was normal finding.   - Colonoscopy 5/29-- normal mucosa of the colon, diverticulosis. Discussed with Dr. Davis, possible capsule endoscopy as outpatient  - received 1unit PRBC 5/24 for hgb 6.6 and another unit on  5/28 for hgb of 6.9  - received venofer x 2 doses  - hgb 8.9--9.3--8.7 in last 24 hrs  - continue PPI PO BID  - resumed ASA as above. Holding lovenox at this time    AIDA  Likely combination of diuresis for resp failure and GIB.  Baseline Cr 0.7.  Admission Cr 1.3 initially improved to 1.0 but subsequently trended up again, improved with fluids and holding lasis to 1.25. again trended up to 1.5 today after administration of Lasix 20mg IV on 5/30.    - will give 0.45 NS 250cc over the next 5 hours  - encourage PO intake  - avoid nephrotoxins  - f/u BMP in AM    Hypernatremia  Due to poor oral intake and possible component of IVF  - Na peaked at 158 on 5/26, improved to 151 after D5W. Stop D5W on 5/27.  - Na 148 on 5/30  - f/u Na level in AM  - encourage oral hydration     Hypokalemia  - K replacement protocol    Chronic indwelling urinary catheter  Urine culture growing Candida, as currently afebrile with negative blood cultures will not treat.     Continue catheter      Hypertension   Intermittent atrial fibrillation not on anticoagulation   Chronic diastolic congestive heart failure   Appears compensated.  BNP down from 4500 at recent discharge to 800.  No pulmonary edema on CT scan.  Was not previously on anticoagulation for A-fib.   - continue with Metoprolol BID  - ASA resumed as above  - Hold  lasix as above      Type 2 diabetes mellitus     Stable, continue dietary control      Depression     Continue paroxetine      Stroke history   He was not on anticoagulation for atrial fibrillation but is currently being anticoagulated for the deep venous thrombosis.  Wheelchair bound at baseline.  - Continue statin  - ASA resumed on 5/30     Severe malnutrition  In the context of acute illness.Chronic illness or disease.   - nutrition following       Diet: Snacks/Supplements Adult: Other; Chocolate Vital Cuisine (UNOPENED - COLD); With Meals  Combination Diet Dysphagia Diet Level 2: Mechan Altered; Nectar Thickened  Liquids (pre-thickened or use instant food thickener) (by tsp); 2 gm NA Diet    DVT Prophylaxis: Pneumatic Compression Devices  Cardona Catheter: in place, indication: Retention  Code Status: Full Code           Disposition Plan   Expected discharge: 2 - 3 days, recommended to transitional care unit once patient is currently with new confusion and also hypoxia. needs to improve prior to discharge. .      Entered: Tien Gimenez MD 05/30/2021, 8:37 AM       The patient's care was discussed with the Bedside Nurse and Patient.    Will update daughter as able later--> addendum--dtr updated by phone at 10am    Tien Gimenez MD  Hospitalist Service  [facility]  Contact information available via [facility] Paging/Directory    ______________________________________________________________________    Interval History   Overnight events noted, more confusion. Seems to be oriented at times, then says non-sense things.    This morning. Easily arousal. He thinks he is at his mother's house, then when told he is in hospital. He asks how long he has been in the hospital. Denies pain or feeling short of breath. He went back to sleep.    Data reviewed today: I reviewed all medications, new labs and imaging results over the last 24 hours. I personally reviewed no images or EKG's today.    Physical Exam   Vital Signs: Temp: 98  F (36.7  C) Temp src: Oral BP: 109/63 Pulse: 84   Resp: 16 SpO2: 98 % O2 Device: Nasal cannula Oxygen Delivery: 6 LPM  Weight: 237 lbs 10.49 oz  General Appearance: Alert, awake and disoriented  Respiratory: diminished breath sounds at the bases,  upper lung fields are clear to auscultation  Cardiovascular: regular rate and rhythm  GI: soft, active tenderness, states hurts when palpating but not consistent as at times he says it does not hurt  Skin: warm and dry      Data   Recent Labs   Lab 05/30/21  0628 05/29/21  2339 05/29/21  0647 05/28/21  0655 05/28/21  0655 05/27/21  0611  05/27/21  0611 05/26/21  0637 05/26/21  0637   WBC  --  10.9  --   --   --   --  8.3  --  7.0   HGB 8.7* 9.3* 8.9*   < > 6.9*   < > 7.3*  --  8.0*   MCV  --  96  --   --   --   --  98  --  98   PLT  --  226  --   --  211  --  223  --  258   * 148* 150*  --  150*   < > 151*   < > 158*   POTASSIUM 3.3* 3.2* 3.7   < > 3.3*   < > 3.4   < > 3.1*   CHLORIDE 114* 114* 115*  --  116*  --  116*  --  119*   CO2 28 31 30  --  32  --  35*  --  36*   BUN 16 15 15  --  18  --  26  --  33*   CR 1.51* 1.32* 1.25  --  1.35*  --  1.30*  --  1.40*   ANIONGAP 6 3 5  --  2*  --  <1*  --  3   RAJ 8.3* 8.4* 8.4*  --  8.3*  --  8.3*  --  8.9   * 104* 95  --  97  --  120*  --  107*    < > = values in this interval not displayed.     Recent Results (from the past 24 hour(s))   XR Chest Port 1 View    Narrative    EXAM: XR CHEST PORT 1 VIEW  LOCATION: [facility]  DATE/TIME: 5/29/2021 11:23 PM    INDICATION: Difficulty breathing.  COMPARISON: CTA chest 05/18/2021. Chest radiograph 05/18/2021.      Impression    IMPRESSION: Low lung volumes. Mild interstitial prominence. Mild bibasilar atelectasis. No significant pleural fluid. No pneumothorax. Stable mild cardiomegaly. Cardiac rhythm monitoring device implanted in the left chest wall.     Medications     - MEDICATION INSTRUCTIONS -         allopurinol  300 mg Oral Daily     aspirin  81 mg Oral Daily     atorvastatin  10 mg Oral Daily     cyanocobalamin  500 mcg Sublingual Daily     [Held by provider] enoxaparin ANTICOAGULANT  1 mg/kg Subcutaneous Q12H     [Held by provider] furosemide  40 mg Oral Daily     ipratropium-albuterol  1 puff Inhalation 4x Daily     metoprolol tartrate  12.5 mg Oral BID     multivitamin w/minerals  1 tablet Oral Daily     pantoprazole  40 mg Oral BID AC     PARoxetine  20 mg Oral Daily     polyethylene glycol  17 g Oral Daily     potassium chloride  40 mEq Oral or Feeding Tube Once     sodium chloride (PF)  3 mL Intracatheter Q8H

## 2021-05-30 NOTE — PLAN OF CARE
Vital signs stable on 5 L nasal cannula. Alert and oriented to time and self but more lethargic/forgetful (MD paged). Lung sounds diminished with expiratory wheezes. Bowel sounds active, flatus present. Patient denies pain. Up with lift. Tolerating nectar thick liquids. CMS/neuros intact except out on left side form previous CVA. Turn and repo q2h. Mepilex on coccyx for reddened area. Cardona in place with good output.

## 2021-05-31 ENCOUNTER — APPOINTMENT (OUTPATIENT)
Dept: CT IMAGING | Facility: CLINIC | Age: 81
DRG: 871 | End: 2021-05-31
Attending: HOSPITALIST
Payer: COMMERCIAL

## 2021-05-31 LAB
ANION GAP SERPL CALCULATED.3IONS-SCNC: 3 MMOL/L (ref 3–14)
BASOPHILS # BLD AUTO: 0 10E9/L (ref 0–0.2)
BASOPHILS NFR BLD AUTO: 0.3 %
BUN SERPL-MCNC: 14 MG/DL (ref 7–30)
CALCIUM SERPL-MCNC: 8.1 MG/DL (ref 8.5–10.1)
CHLORIDE SERPL-SCNC: 111 MMOL/L (ref 94–109)
CO2 SERPL-SCNC: 29 MMOL/L (ref 20–32)
CREAT SERPL-MCNC: 1.22 MG/DL (ref 0.66–1.25)
DIFFERENTIAL METHOD BLD: ABNORMAL
EOSINOPHIL # BLD AUTO: 0.4 10E9/L (ref 0–0.7)
EOSINOPHIL NFR BLD AUTO: 5.3 %
ERYTHROCYTE [DISTWIDTH] IN BLOOD BY AUTOMATED COUNT: 21.1 % (ref 10–15)
GFR SERPL CREATININE-BSD FRML MDRD: 56 ML/MIN/{1.73_M2}
GLUCOSE SERPL-MCNC: 94 MG/DL (ref 70–99)
HCT VFR BLD AUTO: 27.7 % (ref 40–53)
HGB BLD-MCNC: 8.2 G/DL (ref 13.3–17.7)
IMM GRANULOCYTES # BLD: 0.1 10E9/L (ref 0–0.4)
IMM GRANULOCYTES NFR BLD: 1.6 %
LYMPHOCYTES # BLD AUTO: 1.8 10E9/L (ref 0.8–5.3)
LYMPHOCYTES NFR BLD AUTO: 23.8 %
MCH RBC QN AUTO: 28.9 PG (ref 26.5–33)
MCHC RBC AUTO-ENTMCNC: 29.6 G/DL (ref 31.5–36.5)
MCV RBC AUTO: 98 FL (ref 78–100)
MONOCYTES # BLD AUTO: 0.7 10E9/L (ref 0–1.3)
MONOCYTES NFR BLD AUTO: 9.8 %
NEUTROPHILS # BLD AUTO: 4.5 10E9/L (ref 1.6–8.3)
NEUTROPHILS NFR BLD AUTO: 59.2 %
NRBC # BLD AUTO: 0 10*3/UL
NRBC BLD AUTO-RTO: 0 /100
PLATELET # BLD AUTO: 170 10E9/L (ref 150–450)
POTASSIUM SERPL-SCNC: 3.6 MMOL/L (ref 3.4–5.3)
PROCALCITONIN SERPL-MCNC: <0.05 NG/ML
RBC # BLD AUTO: 2.84 10E12/L (ref 4.4–5.9)
SODIUM SERPL-SCNC: 143 MMOL/L (ref 133–144)
WBC # BLD AUTO: 7.5 10E9/L (ref 4–11)

## 2021-05-31 PROCEDURE — 71250 CT THORAX DX C-: CPT

## 2021-05-31 PROCEDURE — 999N000157 HC STATISTIC RCP TIME EA 10 MIN

## 2021-05-31 PROCEDURE — 85025 COMPLETE CBC W/AUTO DIFF WBC: CPT | Performed by: INTERNAL MEDICINE

## 2021-05-31 PROCEDURE — 99233 SBSQ HOSP IP/OBS HIGH 50: CPT | Performed by: HOSPITALIST

## 2021-05-31 PROCEDURE — 250N000013 HC RX MED GY IP 250 OP 250 PS 637: Performed by: HOSPITALIST

## 2021-05-31 PROCEDURE — 84145 PROCALCITONIN (PCT): CPT | Performed by: HOSPITALIST

## 2021-05-31 PROCEDURE — 36415 COLL VENOUS BLD VENIPUNCTURE: CPT | Performed by: INTERNAL MEDICINE

## 2021-05-31 PROCEDURE — 250N000011 HC RX IP 250 OP 636: Performed by: INTERNAL MEDICINE

## 2021-05-31 PROCEDURE — 120N000001 HC R&B MED SURG/OB

## 2021-05-31 PROCEDURE — 80048 BASIC METABOLIC PNL TOTAL CA: CPT | Performed by: INTERNAL MEDICINE

## 2021-05-31 PROCEDURE — 250N000013 HC RX MED GY IP 250 OP 250 PS 637: Performed by: INTERNAL MEDICINE

## 2021-05-31 PROCEDURE — 250N000013 HC RX MED GY IP 250 OP 250 PS 637: Performed by: PHYSICIAN ASSISTANT

## 2021-05-31 PROCEDURE — 93005 ELECTROCARDIOGRAM TRACING: CPT

## 2021-05-31 PROCEDURE — 36415 COLL VENOUS BLD VENIPUNCTURE: CPT | Performed by: HOSPITALIST

## 2021-05-31 RX ADMIN — IPRATROPIUM BROMIDE AND ALBUTEROL 1 PUFF: 20; 100 SPRAY, METERED RESPIRATORY (INHALATION) at 21:13

## 2021-05-31 RX ADMIN — IPRATROPIUM BROMIDE AND ALBUTEROL 1 PUFF: 20; 100 SPRAY, METERED RESPIRATORY (INHALATION) at 14:11

## 2021-05-31 RX ADMIN — ASPIRIN 81 MG: 81 TABLET, COATED ORAL at 11:53

## 2021-05-31 RX ADMIN — ALLOPURINOL 300 MG: 300 TABLET ORAL at 11:53

## 2021-05-31 RX ADMIN — PANTOPRAZOLE SODIUM 40 MG: 40 TABLET, DELAYED RELEASE ORAL at 11:53

## 2021-05-31 RX ADMIN — Medication 500 MCG: at 11:53

## 2021-05-31 RX ADMIN — IPRATROPIUM BROMIDE AND ALBUTEROL 1 PUFF: 20; 100 SPRAY, METERED RESPIRATORY (INHALATION) at 11:54

## 2021-05-31 RX ADMIN — METOPROLOL TARTRATE 12.5 MG: 25 TABLET, FILM COATED ORAL at 11:52

## 2021-05-31 RX ADMIN — PANTOPRAZOLE SODIUM 40 MG: 40 TABLET, DELAYED RELEASE ORAL at 17:28

## 2021-05-31 RX ADMIN — PAROXETINE HYDROCHLORIDE 20 MG: 20 TABLET, FILM COATED ORAL at 11:53

## 2021-05-31 RX ADMIN — CEFTRIAXONE SODIUM 1 G: 1 INJECTION, POWDER, FOR SOLUTION INTRAMUSCULAR; INTRAVENOUS at 14:03

## 2021-05-31 RX ADMIN — MULTIPLE VITAMINS W/ MINERALS TAB 1 TABLET: TAB at 11:52

## 2021-05-31 RX ADMIN — ATORVASTATIN CALCIUM 10 MG: 10 TABLET, FILM COATED ORAL at 11:52

## 2021-05-31 ASSESSMENT — ACTIVITIES OF DAILY LIVING (ADL)
ADLS_ACUITY_SCORE: 25
ADLS_ACUITY_SCORE: 25
ADLS_ACUITY_SCORE: 27
ADLS_ACUITY_SCORE: 25
ADLS_ACUITY_SCORE: 25
ADLS_ACUITY_SCORE: 27

## 2021-05-31 NOTE — PLAN OF CARE
Oxygen weaned down to 2 liters NC, lung sounds diminished, pt has remained afebrile, pulmonary hygiene encouraged. Appetite fair, urine output adequate, clear allan. PIP measures maintained, pt on pulsate mattress.

## 2021-05-31 NOTE — PLAN OF CARE
A+Ox4 but has slow, slurred speech. VSS on HFNC 45% at 35 lpm. LS coarse. Tele SR w/ BBB. Bowels active, passing flatus, no BM. Cardona w/ adequate UOP. Denies pain. Turn and repo. Mepilex to coccyx CDI. 2+ edema in BLE. Denies pain. Tolerating DD2 w/ nectar thick liquids. Up w/ lift.

## 2021-05-31 NOTE — PLAN OF CARE
Lethargic and disoriented to situation and time this morning, later this afternoon pt started getting more alert, aware he is in the hospital but doesn't remember why or being confused, not saying such illogical things anymore.   VSS EX sats bounce around 87-92% on high flow O2 per RT  . Tele SR with BBB.   Up with lift, refused getting up to the chair today. Frequent turn and repo.  Coccyx wound cleaned and new dressing placed.  LS course/diminished with congested weak cough.  IS and ac roni encouraged- participated more with this today than previously. BS+. No BM since bowel prep yesterday.  Cardona catheter exchanged today- minimal output.  PO encouraged- pt now eating and drinking more as he starts to be more alert.

## 2021-05-31 NOTE — PROGRESS NOTES
[facility]    Medicine Progress Note - Hospitalist Service       Date of Admission:  5/18/2021  Assessment & Plan         Ron Gilmore is a 80 year old male admitted on 5/18/2021.  Complex past medical history of CVA with residual left-sided weakness, COPD, chronic urinary retention with chronic indwelling Cardona catheter, history of septic shock secondary to E. 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  Also history of dyslipidemia, depression, diabetes mellitus type 2 diet-controlled and dysphagia and recent COVID-19 infection and recent admission to [facility] from 05/10 to 05/17 with acute diastolic congestive heart failure, hypoxic respiratory failure, possible pneumonia.  He is re admitted on 5/18/2021 with fever and possible worsening of oxygenation.     Acute on chronic hypoxic respiratory failure  SIRS  Suspected pulmonary embolism s/p IVC filter 5/24  Pulmonary infiltrates   Chronic obstructive lung disease/emphysema  Sleep disordered breathing   Recent COVID-19 infection/COVID-recovered  Right peroneal vein DVT  CT negative for pulmonary embolism in main arteries but segmental or smaller not ruled out due to motion artifact.  D-dimer 1.7. Lungs showed emphysema and patchy lower lobe infiltrates but small.  No pulmonary edema.  Pulmonary embolism is still a concern especially with the right calf deep venous thrombosis. CT does show emphysema and bilateral lower lobe infiltrates.  He was initially febrile but after starting antibiotics, he has no more fever.  No leukocytosis and pro-calcitonin is undetectable.  weaned from HFNC 5/22  Lovenox held 5/23 due to probable GIB and underwent IVC filter placement 5/24  *oxygen weaned down to 4L NC on 5/27 which appears to be level he was discharged from the hospital during his  recent admission.   * Completed 10 days of zosyn on 5/28 for probable pneumonia  * Oxygen needs have increased as of 5/29 upto 6L and to 50%FiO2 on high flow  ABG notes worsening hypoxemia  With his new-carlos hypoxemic deterioration on 5/28-5/30. Concern for new process  PE less likely with IVC filter in place  UA very possible, but I think does not explain the recurrent hypoxemia  No wheezes to suggest COOPD, and CXR very low lung volumes  Plan  - currently holding lovenox and lasix  - continue high flow oxygen  - CT CAP without contrast  - Continue inhaled bronchodilators   - Aggressive pulm toilet with flutter valve  - Up in a chair as much as possible    Likely new UTI in the setting of chronic chirinos catheterization  UA on 5/30 with large amount of WBC, had been changed previously this admission  Plan  - continue ceftriaxone  - follow-up on the culture results  - check PCT  - repeat abdominal imaging, given recent stenting, can do without contrast given CKD    Acute metabolic encephalopathy  hypernatremia  Patient has been confused since late afternoon of 5/29.  Seems to be oriented to place and time at times, then talks something noon-sense. This could be due to hypoxia as above vs delirium vs other  - managing hypoxia as above  - treat UTI with ceftriaxone  - up in a chair, frequent orientation    Acute on chronic anemia due to probable upper GIB  Hgb 3/2021 wnl but fell to about 10 g/dL following hospitalization that month and stable at admission, but trended down over several days with RN noting melanic stool 5/23.  Hx colon polyps on colonoscopy in 2006, no prior hx GIB.   *GI followed; initially EGD held due to resp status, then he declined EGD once resp status improved. Due to decreasing hgb, GI recosult on 5/28 and EGD was completed which was normal finding.   - Colonoscopy 5/29-- normal mucosa of the colon, diverticulosis. Discussed with Dr. Davis, possible capsule endoscopy as outpatient  - received  1unit PRBC 5/24 for hgb 6.6 and another unit on 5/28 for hgb of 6.9  - received venofer x 2 doses  - hgb 8.9--9.3--8.7 in last 24 hrs  - continue PPI PO BID  - resumed ASA as above. Holding lovenox at this time, could trial heparin at some point    AIDA  Likely combination of diuresis for resp failure and GIB.  Baseline Cr 0.7.   - holding IVF  - encourage PO intake  - avoid nephrotoxins  - f/u BMP in AM    Hypernatremia  Due to poor oral intake and possible component of IVF  - Na peaked at 158 on 5/26, improved to 151 after D5W. Stop D5W on 5/27.  - Na 148 on 5/30  - f/u Na level in AM  - encourage oral hydration     Hypokalemia  - K replacement protocol     Chronic indwelling urinary catheter  Urine culture growing Candida, as currently afebrile with negative blood cultures will not treat.     Continue catheter      Hypertension   Intermittent atrial fibrillation not on anticoagulation   Chronic diastolic congestive heart failure   Appears compensated.  BNP down from 4500 at recent discharge to 800.  No pulmonary edema on CT scan.  Was not previously on anticoagulation for A-fib.   - continue with Metoprolol BID  - ASA resumed as above  - Hold  lasix as above      Type 2 diabetes mellitus     Stable, continue dietary control      Depression     Continue paroxetine      Stroke history   He was not on anticoagulation for atrial fibrillation but is currently being anticoagulated for the deep venous thrombosis.  Wheelchair bound at baseline.  - Continue statin  - ASA resumed on 5/30     Severe malnutrition  In the context of acute illness.Chronic illness or disease.   - nutrition following       Diet: Snacks/Supplements Adult: Other; Chocolate Vital Cuisine (UNOPENED - COLD); With Meals  Combination Diet Dysphagia Diet Level 2: Mechan Altered; Nectar Thickened Liquids (pre-thickened or use instant food thickener) (by tsp); 2 gm NA Diet    DVT Prophylaxis: Pneumatic Compression Devices  Cardona Catheter: in place,  indication: Retention, Retention  Code Status: Full Code           Disposition Plan   Expected discharge: 2 - 3 days, recommended to transitional care unit once patient is currently with new confusion and also hypoxia. needs to improve prior to discharge. .      Entered: Rg Tobin DO 05/31/2021, 8:52 AM       The patient's care was discussed with the Bedside Nurse and Patient.      Rg Tobin DO  [facility]  Contact information available via [facility] Paging/Directory    ______________________________________________________________________    Interval History   Records reviewed  Awakes to voice, wants to be left alone  Endorses shortness of breath, and back pain from being in bed. No chest pain. Carine through rationale for CT scan but he does not know if he wants to do it.     Data reviewed today: I reviewed all medications, new labs and imaging results over the last 24 hours. I personally reviewed no images or EKG's today.    Physical Exam   Vital Signs: Temp: 98  F (36.7  C) Temp src: Oral BP: (!) 142/75 Pulse: 86   Resp: 16 SpO2: 96 % O2 Device: High Flow Nasal Cannula (HFNC) Oxygen Delivery: (P) 35 LPM  Weight: 212 lbs 8.38 oz  General Appearance: Alert, awake and disoriented  Respiratory: diminished breath sounds at the bases,  upper lung fields are clear to auscultation  Cardiovascular: regular rate and rhythm  GI: soft, active tenderness, no pain with palpation.   Skin: warm and dry      Data   Recent Labs   Lab 05/30/21  1731 05/30/21  0628 05/29/21  2339 05/29/21  0647 05/28/21  0655 05/28/21  0655 05/27/21  0611 05/27/21  0611 05/26/21  0637 05/26/21  0637   WBC  --   --  10.9  --   --   --   --  8.3  --  7.0   HGB  --  8.7* 9.3* 8.9*   < > 6.9*   < > 7.3*  --  8.0*   MCV  --   --  96  --   --   --   --  98  --  98   PLT  --   --  226  --   --  211  --  223  --  258   NA  --  148* 148* 150*  --  150*   < > 151*   < > 158*   POTASSIUM 4.8 3.3*  3.2* 3.7   < > 3.3*   < > 3.4   < > 3.1*   CHLORIDE  --  114* 114* 115*  --  116*  --  116*  --  119*   CO2  --  28 31 30  --  32  --  35*  --  36*   BUN  --  16 15 15  --  18  --  26  --  33*   CR  --  1.51* 1.32* 1.25  --  1.35*  --  1.30*  --  1.40*   ANIONGAP  --  6 3 5  --  2*  --  <1*  --  3   RAJ  --  8.3* 8.4* 8.4*  --  8.3*  --  8.3*  --  8.9   GLC  --  100* 104* 95  --  97  --  120*  --  107*   ALBUMIN  --  1.8*  --   --   --   --   --   --   --   --    PROTTOTAL  --  6.2*  --   --   --   --   --   --   --   --    BILITOTAL  --  1.7*  --   --   --   --   --   --   --   --    ALKPHOS  --  67  --   --   --   --   --   --   --   --    ALT  --  12  --   --   --   --   --   --   --   --    AST  --  19  --   --   --   --   --   --   --   --     < > = values in this interval not displayed.     No results found for this or any previous visit (from the past 24 hour(s)).  Medications     - MEDICATION INSTRUCTIONS -         allopurinol  300 mg Oral Daily     aspirin  81 mg Oral Daily     atorvastatin  10 mg Oral Daily     cefTRIAXone  1 g Intravenous Q24H     cyanocobalamin  500 mcg Sublingual Daily     [Held by provider] enoxaparin ANTICOAGULANT  1 mg/kg Subcutaneous Q12H     [Held by provider] furosemide  40 mg Oral Daily     ipratropium-albuterol  1 puff Inhalation 4x Daily     metoprolol tartrate  12.5 mg Oral BID     multivitamin w/minerals  1 tablet Oral Daily     pantoprazole  40 mg Oral BID AC     PARoxetine  20 mg Oral Daily     polyethylene glycol  17 g Oral Daily     sodium chloride (PF)  3 mL Intracatheter Q8H

## 2021-06-01 ENCOUNTER — APPOINTMENT (OUTPATIENT)
Dept: SPEECH THERAPY | Facility: CLINIC | Age: 81
DRG: 871 | End: 2021-06-01
Attending: HOSPITALIST
Payer: COMMERCIAL

## 2021-06-01 LAB
ANION GAP SERPL CALCULATED.3IONS-SCNC: 4 MMOL/L (ref 3–14)
BACTERIA SPEC CULT: ABNORMAL
BUN SERPL-MCNC: 11 MG/DL (ref 7–30)
CALCIUM SERPL-MCNC: 8.1 MG/DL (ref 8.5–10.1)
CHLORIDE SERPL-SCNC: 109 MMOL/L (ref 94–109)
CO2 SERPL-SCNC: 28 MMOL/L (ref 20–32)
CREAT SERPL-MCNC: 1.1 MG/DL (ref 0.66–1.25)
ERYTHROCYTE [DISTWIDTH] IN BLOOD BY AUTOMATED COUNT: 21.1 % (ref 10–15)
GFR SERPL CREATININE-BSD FRML MDRD: 64 ML/MIN/{1.73_M2}
GLUCOSE BLDC GLUCOMTR-MCNC: 91 MG/DL (ref 70–99)
GLUCOSE BLDC GLUCOMTR-MCNC: 94 MG/DL (ref 70–99)
GLUCOSE SERPL-MCNC: 90 MG/DL (ref 70–99)
HCT VFR BLD AUTO: 28.2 % (ref 40–53)
HGB BLD-MCNC: 8.3 G/DL (ref 13.3–17.7)
Lab: ABNORMAL
MCH RBC QN AUTO: 28.3 PG (ref 26.5–33)
MCHC RBC AUTO-ENTMCNC: 29.4 G/DL (ref 31.5–36.5)
MCV RBC AUTO: 96 FL (ref 78–100)
PLATELET # BLD AUTO: 157 10E9/L (ref 150–450)
POTASSIUM SERPL-SCNC: 3.4 MMOL/L (ref 3.4–5.3)
RBC # BLD AUTO: 2.93 10E12/L (ref 4.4–5.9)
SODIUM SERPL-SCNC: 141 MMOL/L (ref 133–144)
SPECIMEN SOURCE: ABNORMAL
WBC # BLD AUTO: 7.6 10E9/L (ref 4–11)

## 2021-06-01 PROCEDURE — 250N000013 HC RX MED GY IP 250 OP 250 PS 637: Performed by: HOSPITALIST

## 2021-06-01 PROCEDURE — 120N000001 HC R&B MED SURG/OB

## 2021-06-01 PROCEDURE — 999N000157 HC STATISTIC RCP TIME EA 10 MIN

## 2021-06-01 PROCEDURE — 85027 COMPLETE CBC AUTOMATED: CPT | Performed by: HOSPITALIST

## 2021-06-01 PROCEDURE — 999N001017 HC STATISTIC GLUCOSE BY METER IP

## 2021-06-01 PROCEDURE — 250N000011 HC RX IP 250 OP 636: Performed by: INTERNAL MEDICINE

## 2021-06-01 PROCEDURE — 80048 BASIC METABOLIC PNL TOTAL CA: CPT | Performed by: HOSPITALIST

## 2021-06-01 PROCEDURE — 36415 COLL VENOUS BLD VENIPUNCTURE: CPT | Performed by: HOSPITALIST

## 2021-06-01 PROCEDURE — 250N000013 HC RX MED GY IP 250 OP 250 PS 637: Performed by: PHYSICIAN ASSISTANT

## 2021-06-01 PROCEDURE — 250N000013 HC RX MED GY IP 250 OP 250 PS 637: Performed by: INTERNAL MEDICINE

## 2021-06-01 PROCEDURE — 92526 ORAL FUNCTION THERAPY: CPT | Mod: GN | Performed by: SPEECH-LANGUAGE PATHOLOGIST

## 2021-06-01 PROCEDURE — 99232 SBSQ HOSP IP/OBS MODERATE 35: CPT | Performed by: HOSPITALIST

## 2021-06-01 RX ADMIN — IPRATROPIUM BROMIDE AND ALBUTEROL 1 PUFF: 20; 100 SPRAY, METERED RESPIRATORY (INHALATION) at 13:00

## 2021-06-01 RX ADMIN — PAROXETINE HYDROCHLORIDE 20 MG: 20 TABLET, FILM COATED ORAL at 09:17

## 2021-06-01 RX ADMIN — ATORVASTATIN CALCIUM 10 MG: 10 TABLET, FILM COATED ORAL at 09:20

## 2021-06-01 RX ADMIN — IPRATROPIUM BROMIDE AND ALBUTEROL 1 PUFF: 20; 100 SPRAY, METERED RESPIRATORY (INHALATION) at 17:58

## 2021-06-01 RX ADMIN — ALLOPURINOL 300 MG: 300 TABLET ORAL at 09:20

## 2021-06-01 RX ADMIN — Medication 500 MCG: at 09:17

## 2021-06-01 RX ADMIN — PANTOPRAZOLE SODIUM 40 MG: 40 TABLET, DELAYED RELEASE ORAL at 09:17

## 2021-06-01 RX ADMIN — IPRATROPIUM BROMIDE AND ALBUTEROL 1 PUFF: 20; 100 SPRAY, METERED RESPIRATORY (INHALATION) at 21:50

## 2021-06-01 RX ADMIN — CEFTRIAXONE SODIUM 1 G: 1 INJECTION, POWDER, FOR SOLUTION INTRAMUSCULAR; INTRAVENOUS at 13:47

## 2021-06-01 RX ADMIN — PANTOPRAZOLE SODIUM 40 MG: 40 TABLET, DELAYED RELEASE ORAL at 17:59

## 2021-06-01 RX ADMIN — METOPROLOL TARTRATE 12.5 MG: 25 TABLET, FILM COATED ORAL at 21:51

## 2021-06-01 RX ADMIN — ASPIRIN 81 MG: 81 TABLET, COATED ORAL at 09:20

## 2021-06-01 RX ADMIN — IPRATROPIUM BROMIDE AND ALBUTEROL 1 PUFF: 20; 100 SPRAY, METERED RESPIRATORY (INHALATION) at 09:25

## 2021-06-01 RX ADMIN — POLYETHYLENE GLYCOL 3350 17 G: 17 POWDER, FOR SOLUTION ORAL at 09:20

## 2021-06-01 RX ADMIN — MULTIPLE VITAMINS W/ MINERALS TAB 1 TABLET: TAB at 09:20

## 2021-06-01 RX ADMIN — METOPROLOL TARTRATE 12.5 MG: 25 TABLET, FILM COATED ORAL at 09:20

## 2021-06-01 ASSESSMENT — ACTIVITIES OF DAILY LIVING (ADL)
ADLS_ACUITY_SCORE: 27

## 2021-06-01 NOTE — PLAN OF CARE
A+Ox4 but has slow, slurred speech. VSS on 2L O2, attempted to wean. LS coarse. Tele SR w/ BBB. Bowels active, passing flatus, no BM. Cardona w/ adequate UOP. Denies pain. Turn and repo. Mepilex to coccyx CDI. 2+ edema in BLE. Denies pain. Tolerating DD2 w/ nectar thick liquids. Up w/ lift.

## 2021-06-01 NOTE — PROGRESS NOTES
[facility]    Medicine Progress Note - Hospitalist Service       Date of Admission:  5/18/2021  Assessment & Plan         Ron Gilmore is a 80 year old male admitted on 5/18/2021.  Complex past medical history of CVA with residual left-sided weakness, COPD, chronic urinary retention with chronic indwelling Cardona catheter, history of septic shock secondary to E. 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  Also history of dyslipidemia, depression, diabetes mellitus type 2 diet-controlled and dysphagia and recent COVID-19 infection and recent admission to [facility] from 05/10 to 05/17 with acute diastolic congestive heart failure, hypoxic respiratory failure, possible pneumonia.  He is re admitted on 5/18/2021 with fever and possible worsening of oxygenation.     Acute on chronic hypoxic respiratory failure  SIRS  Suspected pulmonary embolism s/p IVC filter 5/24  Pulmonary infiltrates   Chronic obstructive lung disease/emphysema  Sleep disordered breathing   Recent COVID-19 infection/COVID-recovered  Right peroneal vein DVT  CT negative for pulmonary embolism in main arteries but segmental or smaller not ruled out due to motion artifact.  D-dimer 1.7. Lungs showed emphysema and patchy lower lobe infiltrates but small.  No pulmonary edema.  Pulmonary embolism is still a concern especially with the right calf deep venous thrombosis. CT does show emphysema and bilateral lower lobe infiltrates.  He was initially febrile but after starting antibiotics, he has no more fever.  No leukocytosis and pro-calcitonin is undetectable.  weaned from HFNC 5/22  Lovenox held 5/23 due to probable GIB and underwent IVC filter placement 5/24  *oxygen weaned down to 4L NC on 5/27 which appears to be level he was discharged from the hospital during his  recent admission.   * Completed 10 days of zosyn on 5/28 for probable pneumonia  * Oxygen needs have increased as of 5/29 upto 6L and to 50%FiO2 on high flow  ABG notes worsening hypoxemia  CT CAP with extensive bilateral infiltrates  Plan  - currently holding lovenox and lasix  - continue high flow oxygen  - pulmonary consult and slp consultation. These infiltrates may just be residual covid. He is already status post 1 week of zosyn and now on ceftriaxone His hypoxemia is already improving, so perhaps transient aspiration, mucus plugging, or other more transient etiology could have led to worsening last weekend.  - Continue inhaled bronchodilators   - Aggressive pulm toilet with flutter valve  - Up in a chair as much as possible  - follow-up with vascular medicine as outpatient for further anticoagulation/IVC filter management    Likely new UTI in the setting of chronic chirinos catheterization  UA on 5/30 with large amount of WBC, had been changed previously this admission  Plan  - culture with yeast no definite treatment  - continue ceftriaxone for now    Acute metabolic encephalopathy  hypernatremia  Patient has been confused since late afternoon of 5/29.  Seems to be oriented to place and time at times, then talks something noon-sense. This could be due to hypoxia as above vs delirium vs other  - managing hypoxia as above  - up in a chair, frequent orientation    Acute on chronic anemia due to probable upper GIB  Hgb 3/2021 wnl but fell to about 10 g/dL following hospitalization that month and stable at admission, but trended down over several days with RN noting melanic stool 5/23.  Hx colon polyps on colonoscopy in 2006, no prior hx GIB.   *GI followed; initially EGD held due to resp status, then he declined EGD once resp status improved. Due to decreasing hgb, GI recosult on 5/28 and EGD was completed which was normal finding.   - Colonoscopy 5/29-- normal mucosa of the colon, diverticulosis. Discussed with  Dr. Davis, possible capsule endoscopy as outpatient  - received 1unit PRBC 5/24 for hgb 6.6 and another unit on 5/28 for hgb of 6.9  - received venofer x 2 doses  - hgb 8.9--9.3--8.7 in last 24 hrs  - continue PPI PO BID  - resumed ASA as above. Holding lovenox at this time,     AIDA  Likely combination of diuresis for resp failure and GIB.  Baseline Cr 0.7.   - holding IVF and lasix    Hypernatremia (resolved)  Due to poor oral intake and possible component of IVF  - repeat daily  - encourage oral hydration     Hypokalemia  - K replacement protocol     Chronic indwelling urinary catheter  Urine culture growing Candida, as currently afebrile with negative blood cultures will not treat.     Continue catheter      Hypertension   Intermittent atrial fibrillation not on anticoagulation   Chronic diastolic congestive heart failure   Appears compensated.  BNP down from 4500 at recent discharge to 800.  No pulmonary edema on CT scan.  Was not previously on anticoagulation for A-fib.   - continue with Metoprolol BID  - ASA resumed as above  - Hold  lasix as above      Type 2 diabetes mellitus     Stable, continue dietary control      Depression     Continue paroxetine      Stroke history   He was not on anticoagulation for atrial fibrillation but is currently being anticoagulated for the deep venous thrombosis.  Wheelchair bound at baseline.  - Continue statin  - ASA resumed on 5/30     Severe malnutrition  In the context of acute illness.Chronic illness or disease.   - nutrition following       Diet: Snacks/Supplements Adult: Other; Chocolate Vital Cuisine (UNOPENED - COLD); With Meals  Combination Diet Dysphagia Diet Level 2: Mechan Altered; Nectar Thickened Liquids (pre-thickened or use instant food thickener) (by tsp); 2 gm NA Diet    DVT Prophylaxis: Pneumatic Compression Devices  Cardona Catheter: in place, indication: Retention, Retention  Code Status: Full Code           Disposition Plan   Expected discharge: 2 - 3  days, recommended to transitional care unit once patient is currently with new confusion and also hypoxia. needs to improve prior to discharge. .      Entered: Rg Tobin DO 06/01/2021, 1:14 PM       The patient's care was discussed with the Bedside Nurse and Patient.      Rg Tobin DO  Hospitalist Service  [facility]  Contact information available via [facility] Paging/Directory    ______________________________________________________________________    Interval History   Records reviewed  O2 improving  Updated the patient and daughter.  Probably close to baseline functional state    Data reviewed today: I reviewed all medications, new labs and imaging results over the last 24 hours. I personally reviewed no images or EKG's today.    Physical Exam   Vital Signs: Temp: 98.2  F (36.8  C) Temp src: Oral BP: 123/76 Pulse: 96   Resp: 20 SpO2: 99 % O2 Device: Nasal cannula Oxygen Delivery: 2 LPM  Weight: 214 lbs 1.07 oz  General Appearance: Alert, awake and disoriented  Respiratory: diminished breath sounds at the bases,  upper lung fields are clear to auscultation  Cardiovascular: regular rate and rhythm  GI: soft, active tenderness, no pain with palpation.   Skin: warm and dry      Data   Recent Labs   Lab 06/01/21  0643 05/31/21  1133 05/30/21  1731 05/30/21  0628 05/29/21  2339   WBC 7.6 7.5  --   --  10.9   HGB 8.3* 8.2*  --  8.7* 9.3*   MCV 96 98  --   --  96    170  --   --  226    143  --  148* 148*   POTASSIUM 3.4 3.6 4.8 3.3* 3.2*   CHLORIDE 109 111*  --  114* 114*   CO2 28 29  --  28 31   BUN 11 14  --  16 15   CR 1.10 1.22  --  1.51* 1.32*   ANIONGAP 4 3  --  6 3   RAJ 8.1* 8.1*  --  8.3* 8.4*   GLC 90 94  --  100* 104*   ALBUMIN  --   --   --  1.8*  --    PROTTOTAL  --   --   --  6.2*  --    BILITOTAL  --   --   --  1.7*  --    ALKPHOS  --   --   --  67  --    ALT  --   --   --  12  --    AST  --   --   --  19  --      No results found for this or any  previous visit (from the past 24 hour(s)).  Medications     - MEDICATION INSTRUCTIONS -         allopurinol  300 mg Oral Daily     aspirin  81 mg Oral Daily     atorvastatin  10 mg Oral Daily     cefTRIAXone  1 g Intravenous Q24H     cyanocobalamin  500 mcg Sublingual Daily     [Held by provider] enoxaparin ANTICOAGULANT  1 mg/kg Subcutaneous Q12H     [Held by provider] furosemide  40 mg Oral Daily     ipratropium-albuterol  1 puff Inhalation 4x Daily     metoprolol tartrate  12.5 mg Oral BID     multivitamin w/minerals  1 tablet Oral Daily     pantoprazole  40 mg Oral BID AC     PARoxetine  20 mg Oral Daily     polyethylene glycol  17 g Oral Daily     sodium chloride (PF)  3 mL Intracatheter Q8H

## 2021-06-01 NOTE — PLAN OF CARE
SLP - Swallow Tx Update  (Please see IP Rehab Daily Documentation flowsheet and/or summary/discharge planner for Tx notes) Coughing observed after popsicle trials completed.  Pt also demonstrated decreased oral awareness, delayed swallows, and oral residue with regular dry solid trials.  A diet/liquid upgrade is not recommended at this time.  Pt appears to be close to/at baseline level of dysphagia.  Note that diet was upgraded to DDL3 at care center prior to admit with increased respiratory deficits.  Recommend a continued DDL2 and nectar thick liquids with precautions.  Will provide 1-2 additional IP swallow Tx visits.  OP VFSS may be appropriate in 4-8 weeks as indicated/if requested by pt to assess safety for diet/liquid upgrade.

## 2021-06-01 NOTE — PLAN OF CARE
: A&O x4, on 2L/NC, sat in the 90s, Slight ROSS, denies SOB at rest. Encourage CDB & IS use. Turned/ repositioned q 2hrs. Adequate urine via chirinos cath. Medium stool x1- coccyx mepilex soiled, replaced. Up with 2 assist & lift device. LE edema-elevated. Denies pain. Continue to monitor.

## 2021-06-02 ENCOUNTER — APPOINTMENT (OUTPATIENT)
Dept: OCCUPATIONAL THERAPY | Facility: CLINIC | Age: 81
DRG: 871 | End: 2021-06-02
Attending: HOSPITALIST
Payer: COMMERCIAL

## 2021-06-02 ENCOUNTER — APPOINTMENT (OUTPATIENT)
Dept: SPEECH THERAPY | Facility: CLINIC | Age: 81
DRG: 871 | End: 2021-06-02
Payer: COMMERCIAL

## 2021-06-02 LAB
ANION GAP SERPL CALCULATED.3IONS-SCNC: 3 MMOL/L (ref 3–14)
BUN SERPL-MCNC: 10 MG/DL (ref 7–30)
CALCIUM SERPL-MCNC: 8.2 MG/DL (ref 8.5–10.1)
CHLORIDE SERPL-SCNC: 108 MMOL/L (ref 94–109)
CO2 SERPL-SCNC: 30 MMOL/L (ref 20–32)
CREAT SERPL-MCNC: 1.06 MG/DL (ref 0.66–1.25)
ERYTHROCYTE [DISTWIDTH] IN BLOOD BY AUTOMATED COUNT: 20.6 % (ref 10–15)
GFR SERPL CREATININE-BSD FRML MDRD: 67 ML/MIN/{1.73_M2}
GLUCOSE BLDC GLUCOMTR-MCNC: 89 MG/DL (ref 70–99)
GLUCOSE BLDC GLUCOMTR-MCNC: 91 MG/DL (ref 70–99)
GLUCOSE SERPL-MCNC: 80 MG/DL (ref 70–99)
HCT VFR BLD AUTO: 28.7 % (ref 40–53)
HGB BLD-MCNC: 8.5 G/DL (ref 13.3–17.7)
MCH RBC QN AUTO: 28.3 PG (ref 26.5–33)
MCHC RBC AUTO-ENTMCNC: 29.6 G/DL (ref 31.5–36.5)
MCV RBC AUTO: 96 FL (ref 78–100)
PLATELET # BLD AUTO: 187 10E9/L (ref 150–450)
POTASSIUM SERPL-SCNC: 3.4 MMOL/L (ref 3.4–5.3)
RBC # BLD AUTO: 3 10E12/L (ref 4.4–5.9)
SODIUM SERPL-SCNC: 141 MMOL/L (ref 133–144)
WBC # BLD AUTO: 6.9 10E9/L (ref 4–11)

## 2021-06-02 PROCEDURE — 120N000001 HC R&B MED SURG/OB

## 2021-06-02 PROCEDURE — 85027 COMPLETE CBC AUTOMATED: CPT | Performed by: HOSPITALIST

## 2021-06-02 PROCEDURE — 250N000013 HC RX MED GY IP 250 OP 250 PS 637: Performed by: PHYSICIAN ASSISTANT

## 2021-06-02 PROCEDURE — 80048 BASIC METABOLIC PNL TOTAL CA: CPT | Performed by: HOSPITALIST

## 2021-06-02 PROCEDURE — 999N000147 HC STATISTIC PT IP EVAL DEFER: Performed by: PHYSICAL THERAPIST

## 2021-06-02 PROCEDURE — 250N000013 HC RX MED GY IP 250 OP 250 PS 637: Performed by: INTERNAL MEDICINE

## 2021-06-02 PROCEDURE — G0463 HOSPITAL OUTPT CLINIC VISIT: HCPCS

## 2021-06-02 PROCEDURE — 250N000013 HC RX MED GY IP 250 OP 250 PS 637: Performed by: HOSPITALIST

## 2021-06-02 PROCEDURE — 999N001017 HC STATISTIC GLUCOSE BY METER IP

## 2021-06-02 PROCEDURE — 97530 THERAPEUTIC ACTIVITIES: CPT | Mod: GO | Performed by: OCCUPATIONAL THERAPIST

## 2021-06-02 PROCEDURE — 92526 ORAL FUNCTION THERAPY: CPT | Mod: GN | Performed by: SPEECH-LANGUAGE PATHOLOGIST

## 2021-06-02 PROCEDURE — 99232 SBSQ HOSP IP/OBS MODERATE 35: CPT | Performed by: HOSPITALIST

## 2021-06-02 PROCEDURE — 97165 OT EVAL LOW COMPLEX 30 MIN: CPT | Mod: GO | Performed by: OCCUPATIONAL THERAPIST

## 2021-06-02 PROCEDURE — 36415 COLL VENOUS BLD VENIPUNCTURE: CPT | Performed by: HOSPITALIST

## 2021-06-02 RX ADMIN — PANTOPRAZOLE SODIUM 40 MG: 40 TABLET, DELAYED RELEASE ORAL at 09:25

## 2021-06-02 RX ADMIN — METOPROLOL TARTRATE 12.5 MG: 25 TABLET, FILM COATED ORAL at 21:38

## 2021-06-02 RX ADMIN — AMOXICILLIN AND CLAVULANATE POTASSIUM 1 TABLET: 875; 125 TABLET, FILM COATED ORAL at 21:38

## 2021-06-02 RX ADMIN — AMOXICILLIN AND CLAVULANATE POTASSIUM 1 TABLET: 875; 125 TABLET, FILM COATED ORAL at 12:08

## 2021-06-02 RX ADMIN — METOPROLOL TARTRATE 12.5 MG: 25 TABLET, FILM COATED ORAL at 09:24

## 2021-06-02 RX ADMIN — IPRATROPIUM BROMIDE AND ALBUTEROL 1 PUFF: 20; 100 SPRAY, METERED RESPIRATORY (INHALATION) at 17:02

## 2021-06-02 RX ADMIN — ATORVASTATIN CALCIUM 10 MG: 10 TABLET, FILM COATED ORAL at 09:25

## 2021-06-02 RX ADMIN — IPRATROPIUM BROMIDE AND ALBUTEROL 1 PUFF: 20; 100 SPRAY, METERED RESPIRATORY (INHALATION) at 23:27

## 2021-06-02 RX ADMIN — Medication 500 MCG: at 09:24

## 2021-06-02 RX ADMIN — ASPIRIN 81 MG: 81 TABLET, COATED ORAL at 09:27

## 2021-06-02 RX ADMIN — IPRATROPIUM BROMIDE AND ALBUTEROL 1 PUFF: 20; 100 SPRAY, METERED RESPIRATORY (INHALATION) at 09:30

## 2021-06-02 RX ADMIN — PAROXETINE HYDROCHLORIDE 20 MG: 20 TABLET, FILM COATED ORAL at 09:24

## 2021-06-02 RX ADMIN — PANTOPRAZOLE SODIUM 40 MG: 40 TABLET, DELAYED RELEASE ORAL at 17:00

## 2021-06-02 RX ADMIN — MULTIPLE VITAMINS W/ MINERALS TAB 1 TABLET: TAB at 09:25

## 2021-06-02 RX ADMIN — IPRATROPIUM BROMIDE AND ALBUTEROL 1 PUFF: 20; 100 SPRAY, METERED RESPIRATORY (INHALATION) at 13:30

## 2021-06-02 RX ADMIN — ALLOPURINOL 300 MG: 300 TABLET ORAL at 09:24

## 2021-06-02 ASSESSMENT — ACTIVITIES OF DAILY LIVING (ADL)
ADLS_ACUITY_SCORE: 26

## 2021-06-02 NOTE — PLAN OF CARE
Speech Language Therapy Discharge Summary    Reason for therapy discharge:    No further expectations of functional progress.    Progress towards therapy goal(s). See goals on Care Plan in [facility] electronic health record for goal details.  Goals partially met.  Barriers to achieving goals:   IP swallow Tx needs addressed; not appropriate for further upgrades at this time.    Therapy recommendation(s):    No further IP swallow Tx recommended at this time.   Pt appears close to baseline; however, further diet upgrade to DDL3 is not recommended given admit with  increased respiratory deficits after diet upgrade at pt's care center.  Recommend a continued DDL2 and nectar thick liquids with supervision/reminders to use safe swallow strategies/precautions.  Ok for minimal 1-10 ice chips/chips of popsicle 1x per day with direct RN supervision.  Hold if aspiration signs noted.  Consider a repeat OP VFSS in 4-8 weeks as indicated/if requested by pt to assess safety for diet/liquid upgrade.

## 2021-06-02 NOTE — PLAN OF CARE
PT:  Discharge Planner PT   Patient plan for discharge: return to TCU  Current status: Orders received and chart reviewed, pt admitted from TCU, non-ambulatory from baseline, discussed with OT, pt dependent for transfers, no IPPT needs at this point, OT following will defer further PT intervention back to TCU, PT orders completed  Barriers to return to prior living situation: see OT eval  Recommendations for discharge: defer to OT  Rationale for recommendations: defer to OT       Entered by: Ashley Dupree 06/02/2021 10:57 AM

## 2021-06-02 NOTE — PROGRESS NOTES
06/02/21 0800   Quick Adds   Type of Visit Initial Occupational Therapy Evaluation   Living Environment   People in home spouse   Current Living Arrangements condominium   Home Accessibility no concerns   Transportation Anticipated family or friend will provide  (pt has WC van)   Living Environment Comments Pt lives in [facility],  accessible, has hospital bed, aleks lift, power chair.    Self-Care   Usual Activity Tolerance fair   Current Activity Tolerance poor   Equipment Currently Used at Home wheelchair, power;lift device;hospital bed   Activity/Exercise/Self-Care Comment Pt is non-ambulatory at baseline, has PCA assist 5hrs, 5 days a week, his wife assists him otherwise. Uses urinal and bedpan for toileting. Pt states he has been in hospitals/TCUs since January   Disability/Function   Hearing Difficulty or Deaf no   Wear Glasses or Blind yes   Vision Management reading glasses   Concentrating, Remembering or Making Decisions Difficulty no   Difficulty Communicating no   Walking or Climbing Stairs Difficulty yes   Walking or Climbing Stairs transferring difficulty, assistance 1 person;ambulation difficulty, dependent;transferring difficulty, requires equipment   Dressing/Bathing Difficulty yes   Dressing/Bathing dressing difficulty, assistance 1 person;bathing difficulty, assistance 1 person   Toileting issues yes   Toileting Management Bedpan, urinal   Doing Errands Independently Difficulty (such as shopping) yes   Fall history within last six months no   General Information   Onset of Illness/Injury or Date of Surgery 05/18/21   Referring Physician Rg Tobin, DO   Additional Occupational Profile Info/Pertinent History of Current Problem Ron Gilmore is a 80 year old male admitted on 5/18/2021.  Complex past medical history of CVA with residual left-sided weakness, COPD, chronic urinary retention with chronic indwelling Cardona catheter, history of septic shock secondary to E. coli bacteremia  due to an obstructive lumbosacral UV junction stone with hydronephrosis status post right-sided percutaneous tube placement and removal and right-sided stent placement.  Also history of dyslipidemia, depression, diabetes mellitus type 2 diet-controlled and dysphagia and recent COVID-19 infection and recent admission to [facility] from 05/10 to 05/17 with acute diastolic congestive heart failure, hypoxic respiratory failure, possible pneumonia.  He is re admitted on 5/18/2021 with fever and possible worsening of oxygenation.   Existing Precautions/Restrictions fall;oxygen therapy device and L/min   Cognitive Status Examination   Orientation Status orientation to person, place and time   Affect/Mental Status (Cognitive) WFL   Follows Commands WFL   Cognitive Status Comments Pt pleasant and cooperative, responds appropriately, able to provide information about prior living situation   Visual Perception   Visual Impairment/Limitations corrective lenses for reading   Sensory   Sensory Quick Adds No deficits were identified   Pain Assessment   Patient Currently in Pain No   Integumentary/Edema   Integumentary/Edema Comments Mild LE edema   Posture   Posture protracted shoulders   Range of Motion Comprehensive   Comment, General Range of Motion No AROM in L UE, R UE shoulder flexion limited to ~80 degrees, pt stating he has arthritis. Elbow flex/ext WFL, pt with weak  but able to lift cup to his mouth to drink   Strength Comprehensive (MMT)   Comment, General Manual Muscle Testing (MMT) Assessment Not formally assessed, generalized weakness. Pt with 0/5 strength in L UE and LE   Muscle Tone Assessment   Muscle Tone Comments L UE biceps tone noted, able to stretch passively to full range   Coordination   Upper Extremity Coordination Left UE impaired   Bed Mobility   Bed Mobility rolling left;rolling right   Rolling Left [place] (Bed Mobility) moderate assist (50% patient effort)   Rolling Right  [place] (Bed Mobility) moderate assist (50% patient effort)   Assistive Device (Bed Mobility) draw sheet;lift device   Comment (Bed Mobility) Dependent lift to chair   Transfers   Transfers bed-chair transfer   Transfer Skill: Bed to Chair/Chair to Bed   Bed-Chair [place] (Transfers) dependent (less than 25% patient effort);2 person assist   Transfer Comments Ceiling lift transfer to chair   Activities of Daily Living   BADL Assessment/Intervention grooming   Grooming Assessment/Training   [place] Level (Grooming) minimum assist (75% patient effort);set up   Clinical Impression   Criteria for Skilled Therapeutic Interventions Met (OT) yes;meets criteria;skilled treatment is necessary   OT Diagnosis Impaired functional mobility and I/ADLs   OT Problem List-Impairments impacting ADL activity tolerance impaired;balance;mobility;range of motion (ROM);strength;postural control   Assessment of Occupational Performance 5 or more Performance Deficits   Identified Performance Deficits functional mobility, bathing, dressing, toileting, higher level I/ADLs   Planned Therapy Interventions (OT) ADL retraining;bed mobility training;transfer training   Clinical Decision Making Complexity (OT) low complexity   Therapy Frequency (OT) 3x/week   Predicted Duration of Therapy one week   Anticipated Equipment Needs Upon Discharge (OT)   (defer to TCU)   Risk & Benefits of therapy have been explained evaluation/treatment results reviewed;care plan/treatment goals reviewed;risks/benefits reviewed;current/potential barriers reviewed;participants voiced agreement with care plan;participants included;patient   OT Discharge Planning    OT Discharge Recommendation (DC Rec) Transitional Care Facility   OT Rationale for DC Rec Per chart, pt admitted from TCU. Pt is wheelchair bound at baseline, has power chair, aleks lift, and hospital bed at home. Pt has PCA and assist from his wife but reports he was looking at assisted living  facilities prior to his lengthy hospital/TCU admissions. Currently, pt is requiring A of 2 for rolling bed mobility, dependent lift transfer to chair. Recommend TCU to progress activity tolerance towards baseline   Total Evaluation Time (Minutes)   Total Evaluation Time (Minutes) 15   Skin WDL   Skin Color redness blanchable   Skin Temperature warm   Skin Moisture dry,flaky   Skin Elasticity quick return to original state   Skin Integrity/Characteristics bruised

## 2021-06-02 NOTE — PLAN OF CARE
A+Ox4 this shift, pleasant/cooperative. Makes needs known. VSS.   Tolerating thickened liquids without objective signs of aspiration. LS clear to dim this shift. Afebrile.    Denies pain. Chronic catheter QS; 1 x BM incontinent - calls for assistance. Turn/repo.    Plan for TCU pending 1-2 days per most recent note.

## 2021-06-02 NOTE — PROGRESS NOTES
CLINICAL NUTRITION SERVICES - REASSESSMENT NOTE      RECOMMENDATIONS FOR MD/PROVIDER TO ORDER:   Patient has not eaten well for the majority of this hospitalization (15 days). Need to consider more aggressive nutrition support interventions (TF) as pt likely unable to improve intakes on a modified texture diet    Recommendations Ordered by Registered Dietitian (RD):   Discontinue Vital Cuisine supplements  Will send Gelatein Plus (sugar free as out of regular) fruit punch and orange flavor BID with lunch and dinner daily   Assisted patient in meal order today   Please encourage at least 2 meals ordered per day    Malnutrition: (5/25)  % Weight Loss:  > 5% in 1 month (severe malnutrition)  % Intake:  </= 50% for >/= 5 days (severe malnutrition)  Subcutaneous Fat Loss:  Upper arm region moderate depletion  Muscle Loss:  Clavicle bone region mild depletion, Acromion bone region mild depletion and Dorsal hand region mild depletion  Fluid Retention:  Mild as above      Malnutrition Diagnosis: Severe malnutrition  In Context of:  Acute illness or injury  Chronic illness or disease       EVALUATION OF PROGRESS TOWARD GOALS   Diet:  DD2, NTL; 2 gm Na   Vital Cuisine thickened supplement TID with all meals    Intake/Tolerance:    Visited with patient today  Reports appetite is still poor and nothing tastes good, although he feels PO has gone slightly better over the past 2 days   He dislikes the modified texture diet and would like a salad  Pt has been able to eat % of 1 small meal/day over the past 5 days   - Examples of meals ordered include: cottage cheese with chopped pizza (350 kcal, 17 gm protein), Khmer toast with pudding (620 kcal, 7 gm protein), or mac and cheese with squash (360 kcal, 15 gm protein)  He dislikes the new supplements and declines to receive them  Reviewed the remainder of our thickened supplements as pt has tried all available diet-appropriate options and he decided to trial the other flavors  "of the Gelatein Plus (fruit punch and orange in sugar-free)  Overall, patient has consumed sub-optimal nutrition throughout his hospitalization (15 days)      ASSESSED NUTRITION NEEDS:  Dosing Weight 85 kg (adjusted)  Estimated Energy Needs: 6918-1311 kcals (25-30 Kcal/Kg)  Justification: overweight  Estimated Protein Needs: 100-125 grams protein (1.2-1.5 g pro/Kg)  Justification: hypercatabolism with acute illness      NEW FINDINGS:   SLP discharge summary today --> \"no further expectations of functional progress,\" \"Consider a repeat OP VFSS in 4-8 weeks as indicated/if requested by pt to assess safety for diet/liquid upgrade.\"    Previous Goals:   Pt to consume 50% meals and 2 supplements per day  Evaluation: Not met    Previous Nutrition Diagnosis:   Inadequate oral intake related to poor appetite and no taste as evidenced by pt report minimal intake at meals  Evaluation: No change      CURRENT NUTRITION DIAGNOSIS  Inadequate oral intake related to poor appetite and no taste as evidenced by 1 small meal consumed per day     INTERVENTIONS  Recommendations / Nutrition Prescription  Continue diet per SLP  Need to consider more aggressive nutrition interventions     Implementation  Medical Food Supplement: discontinue Vital Cuisine, trial SF Gelatein     Goals  Pt will consume 75% of 2 meals and 2 supplements per day       MONITORING AND EVALUATION:  Progress towards goals will be monitored and evaluated per protocol and Practice Guidelines      Kaya Judge RD, LD  Clinical Dietitian     "

## 2021-06-02 NOTE — CONSULTS
Consult Date: 06/02/2021    REASON FOR CONSULTATION:  Acute on chronic hypoxemic respiratory failure.    HISTORY OF PRESENT ILLNESS:  The patient is a 80-year-old obese male, former smoker with multiple medical problems including COPD, obstructive sleep apnea, noncompliant with CPAP, prior CVA, complicated by residual left sided weakness and dysphagia, recent diagnosis of COVID-19 viral infection on 05/01/2021 and recent hospitalization at [facility] from 5/10/2021-05/17/2021 for acute hypoxemic respiratory failure secondary to diastolic CHF and possible aspiration pneumonia, who is readmitted 1 day after discharge for fever and worsening hypoxemia.  Chest x-ray on admission showed similar interstitial changes with some improvement in the previously seen infiltrates.  CT scan of the chest on admission was negative for PE, but showed a few patchy opacities in the dependent portions of the lower lobes, superimposed on moderate emphysematous changes.  Venous Doppler ultrasound of the lower extremities showed a right calf DVT for which the patient underwent IVC filter placement.  His hospital course was complicated by ongoing hypoxemia.  Repeat CT scan of the chest 05/31/2021 showed bilateral pulmonary infiltrates, most prominent in the left lower lobe with consolidation.  His fever has resolved with empiric antibiotics and oxygenation has improved.  We are now consulted regarding further evaluation and management.    MEDICATIONS:    1.  Allopurinol.  2.  Aspirin.  3.  Lipitor.   4.  Rocephin.   5.  Vitamin B12.   6.  Lovenox.  7.  Lasix.   8.  Combivent.  9.  Respimat.  10.  Lopressor.   11.  Multivitamin.   12.  Protonix.   13.  Paxil.  14.  MiraLax.    PAST MEDICAL HISTORY:     1.  COPD.  2.  Obstructive sleep apnea.  3.  Prior CVA.  4.  DVT.  5.  Obesity.  6.  Hypertension.  7.  Spinal stenosis.  8.  Diabetes mellitus.    ALLERGIES:  No known medical allergies.    SOCIAL HISTORY:  The patient is a former  smoker.    FAMILY HISTORY:  Noncontributory.    REVIEW OF SYSTEMS:  Noncontributory.    PHYSICAL EXAMINATION:    GENERAL:  Reveals a pleasant older gentleman sitting up in a chair in no respiratory distress.  VITAL SIGNS:  He is afebrile, respiratory rate is 22, pulse 98, blood pressure 120/68, and oxygen saturation is 94% on 2 liters per nasal cannula.  HEENT:  Grossly unremarkable.  NECK:  Obese and difficult to examine.  LUNGS:  Revealed decreased breath sounds with prolonged expiratory phase and a few basilar crackles.   CARDIOVASCULAR:  Revealed regular rate and rhythm.  ABDOMEN:  Obese, soft and nontender.    EXTREMITIES:  Notable for left arm and leg weakness, but no cyanosis or clubbing.     LABORATORY TESTING:  Electrolytes are normal, renal function is normal.    LABORATORY DATA:  White count 6.9, hemoglobin 8.5, hematocrit 28.7, platelet count is normal and chest imaging studies are as summarized above.    IMPRESSION:  A 80-year-old obese male, former smoker with COPD, obstructive sleep apnea and prior CVA, complicated by weakness and dysphagia and recent episode of COVID-19 is readmitted for fever and worsening hypoxemia.  Fever has now resolved with empiric antibiotic therapy and his oxygenation has improved.  I suspect the residual pulmonary infiltrates and hypoxemia, most likely are due to chronic aspiration with perhaps some residual from his recent COVID-19 viral infection.  He is currently on appropriate bronchodilators, pulmonary toilet therapy.  I agree with reevaluation of his swallow status and ongoing use of a dysphagia diet and aspiration precautions.  Respiratory status is currently stable on low flow oxygen.    PLAN:     1.  Titrate oxygen to keep SpO2 greater than 90%.  2.  Bronchodilators:  Combivent, Respimat.  3.  Antibiotics: Rocephin.  4.  Steroids:  Consider a course of prednisone if hypoxemia worsens.  5.  Diuresis: Lasix is ordered.  6.  Anticoagulation:  Lovenox.  7.  Dysphagia  diet, aspiration precautions.    I appreciate the opportunity to participate in his care.     Giovani Kelly MD        D: 2021   T: 2021   MT: DELFINA    Name:     SHERI THORPE  MRN:      [id number]        Account:      [id number]   :      1940           Consult Date: 2021     Document: [id number]    cc:  MD Rg Porter,

## 2021-06-02 NOTE — PLAN OF CARE
A&O x4. VSS on 2L. Tele SR, BBB. Lungs diminished. BS+, BM-, flatus-. Tolerating NDD2 nectar thick liquids diet. - N/V. Voiding good UOP. LE edema elevated legs on pillows. Turn repo.

## 2021-06-02 NOTE — PROGRESS NOTES
[facility]    Medicine Progress Note - Hospitalist Service       Date of Admission:  5/18/2021  Assessment & Plan         Ron Gilmore is a 80 year old male admitted on 5/18/2021.  Complex past medical history of CVA with residual left-sided weakness, COPD, chronic urinary retention with chronic indwelling Cardona catheter, history of septic shock secondary to E. 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  Also history of dyslipidemia, depression, diabetes mellitus type 2 diet-controlled and dysphagia and recent COVID-19 infection and recent admission to [facility] from 05/10 to 05/17 with acute diastolic congestive heart failure, hypoxic respiratory failure, possible pneumonia.  He is re admitted on 5/18/2021 with fever and possible worsening of oxygenation.     Acute on chronic hypoxic respiratory failure  SIRS  Suspected pulmonary embolism s/p IVC filter 5/24  Pulmonary infiltrates   Chronic obstructive lung disease/emphysema  Sleep disordered breathing   Recent COVID-19 infection/COVID-recovered  Right peroneal vein DVT  CT negative for pulmonary embolism in main arteries but segmental or smaller not ruled out due to motion artifact.  D-dimer 1.7. Lungs showed emphysema and patchy lower lobe infiltrates but small.  No pulmonary edema.  Pulmonary embolism is still a concern especially with the right calf deep venous thrombosis. CT does show emphysema and bilateral lower lobe infiltrates.  He was initially febrile but after starting antibiotics, he has no more fever.  No leukocytosis and pro-calcitonin is undetectable.  weaned from HFNC 5/22  Lovenox held 5/23 due to probable GIB and underwent IVC filter placement 5/24  * Completed 10 days of zosyn on 5/28 for probable pneumonia  * Oxygen needs have increased as of 5/29 upto 6L and  to 50%FiO2 on high flow  ABG notes worsening hypoxemia  CT CAP with extensive bilateral infiltrates  O2 improving to 2 liters and stable  Most likely acute worsening is due to possible aspiration on top of prior infiltrates  Plan  - currently holding lovenox and lasix  - continue high flow oxygen  - pulmonary consult appreciated, continue plan of care   - SLP reconsulted: continue plan of care  - Continue inhaled bronchodilators   - finish empiric course of antibiotics for aspiration pneumonia with augmentin  - follow-up with vascular medicine as outpatient for further anticoagulation/IVC filter management    Likely new UTI in the setting of chronic chirinos catheterization  UA on 5/30 with large amount of WBC, had been changed previously this admission  No anatomic abnormalities on ct  Plan  - culture with yeast no definite treatment      Acute metabolic encephalopathy  hypernatremia  Patient has been confused since late afternoon of 5/29.  Seems to be oriented to place and time at times, then talks something noon-sense. This could be due to hypoxia as above vs delirium vs other  - managing hypoxia as above  - up in a chair, frequent orientation    Acute on chronic anemia due to probable upper GIB  Hgb 3/2021 wnl but fell to about 10 g/dL following hospitalization that month and stable at admission, but trended down over several days with RN noting melanic stool 5/23.  Hx colon polyps on colonoscopy in 2006, no prior hx GIB.   *GI followed; initially EGD held due to resp status, then he declined EGD once resp status improved. Due to decreasing hgb, GI recosult on 5/28 and EGD was completed which was normal finding.   - Colonoscopy 5/29-- normal mucosa of the colon, diverticulosis. Discussed with Dr. Davis, possible capsule endoscopy as outpatient  - received 1unit PRBC 5/24 for hgb 6.6 and another unit on 5/28 for hgb of 6.9  - received venofer x 2 doses  - hgb 8.9--9.3--8.7 in last 24 hrs  - continue PPI PO BID  -  resumed ASA as above. Holding lovenox at this time,     AIDA  Likely combination of diuresis for resp failure and GIB.  Baseline Cr 0.7.   - holding IVF and lasix    Hypernatremia (resolved)  Due to poor oral intake and possible component of IVF  - repeat daily  - encourage oral hydration     Hypokalemia  - K replacement protocol     Chronic indwelling urinary catheter  Urine culture growing Candida, as currently afebrile with negative blood cultures will not treat.     Continue catheter      Hypertension   Intermittent atrial fibrillation not on anticoagulation   Chronic diastolic congestive heart failure   Appears compensated.  BNP down from 4500 at recent discharge to 800.  No pulmonary edema on CT scan.  Was not previously on anticoagulation for A-fib.   - continue with Metoprolol BID  - ASA resumed as above  - Hold  lasix as above, resume on discharge     Type 2 diabetes mellitus     Stable, continue dietary control      Depression     Continue paroxetine      Stroke history   He was not on anticoagulation for atrial fibrillation but is currently being anticoagulated for the deep venous thrombosis.  Wheelchair bound at baseline.  - Continue statin  - ASA resumed on 5/30     Severe malnutrition  In the context of acute illness.Chronic illness or disease.   - nutrition following       Diet: Snacks/Supplements Adult: Other; Chocolate Vital Cuisine (UNOPENED - COLD); With Meals  Combination Diet Dysphagia Diet Level 2: Mechan Altered; Nectar Thickened Liquids (pre-thickened or use instant food thickener) (by tsp); 2 gm NA Diet    DVT Prophylaxis: Pneumatic Compression Devices  Cardona Catheter: in place, indication: Retention, Retention  Code Status: Full Code           Disposition Plan   Expected discharge: Tomorrow, recommended to transitional care unit once safe facility in in place, and remains stable in terms of hypoxemia      Entered: Rg Tobin DO 06/02/2021, 10:11 AM       The patient's care was  discussed with the Bedside Nurse and Patient.      Rg Tobin, DO  Hospitalist Service  [facility]  Contact information available via [facility] Paging/Directory    ______________________________________________________________________    Interval History   Records reviewed  O2 improving  Updated the patient and daughter.  Probably close to baseline functional state    Data reviewed today: I reviewed all medications, new labs and imaging results over the last 24 hours. I personally reviewed no images or EKG's today.    Physical Exam   Vital Signs: Temp: 97.8  F (36.6  C) Temp src: Oral BP: 121/68 Pulse: 98   Resp: 22 SpO2: 94 % O2 Device: Nasal cannula with humidification Oxygen Delivery: 2 LPM  Weight: 215 lbs 2.7 oz  General Appearance: Alert, awake and disoriented  Respiratory: diminished breath sounds at the bases,  upper lung fields are clear to auscultation  Cardiovascular: regular rate and rhythm  GI: soft, active tenderness, no pain with palpation.   Skin: warm and dry      Data   Recent Labs   Lab 06/02/21  0627 06/01/21  0643 05/31/21  1133 05/30/21  0628 05/30/21  0628   WBC 6.9 7.6 7.5  --   --    HGB 8.5* 8.3* 8.2*  --  8.7*   MCV 96 96 98  --   --     157 170  --   --     141 143  --  148*   POTASSIUM 3.4 3.4 3.6   < > 3.3*   CHLORIDE 108 109 111*  --  114*   CO2 30 28 29  --  28   BUN 10 11 14  --  16   CR 1.06 1.10 1.22  --  1.51*   ANIONGAP 3 4 3  --  6   RAJ 8.2* 8.1* 8.1*  --  8.3*   GLC 80 90 94  --  100*   ALBUMIN  --   --   --   --  1.8*   PROTTOTAL  --   --   --   --  6.2*   BILITOTAL  --   --   --   --  1.7*   ALKPHOS  --   --   --   --  67   ALT  --   --   --   --  12   AST  --   --   --   --  19    < > = values in this interval not displayed.     No results found for this or any previous visit (from the past 24 hour(s)).  Medications     - MEDICATION INSTRUCTIONS -         allopurinol  300 mg Oral Daily     amoxicillin-clavulanate  1 tablet Oral  Q12H [facility]     aspirin  81 mg Oral Daily     atorvastatin  10 mg Oral Daily     cyanocobalamin  500 mcg Sublingual Daily     [Held by provider] enoxaparin ANTICOAGULANT  1 mg/kg Subcutaneous Q12H     [Held by provider] furosemide  40 mg Oral Daily     ipratropium-albuterol  1 puff Inhalation 4x Daily     metoprolol tartrate  12.5 mg Oral BID     multivitamin w/minerals  1 tablet Oral Daily     pantoprazole  40 mg Oral BID AC     PARoxetine  20 mg Oral Daily     polyethylene glycol  17 g Oral Daily     sodium chloride (PF)  3 mL Intracatheter Q8H

## 2021-06-02 NOTE — PROGRESS NOTES
[facility] Nurse Inpatient Wound Assessment   Reason for consultation: Suspected PI coccyx / Lt buttock      ASSESSMENT    Area of very partial thickness tissue denudement across midline gluteal cleft to right lateral buttock, lightly moist, lightly bloody, due to friction and shearing vs actual pressure.  Plan to keep pt on his sides and tissue open to the air at this point.     WOUND CARE PLAN   Wound care to bilateral gluteal cleft.- EFFECTIVE NOW  - keep tissue clean and dry- clean with Allyn wipes and Koki or bath wipes, heavily dust with baby powder   - no Mepilex necessary, if pt is not getting turn then resume use of Mepilex  - when in bed pt must be positioned side to side only, every 1-2 hours  - do not sit in chair longer than 2 hours and upon return to bed must be positioned on his sides  -briefs off or loose in bed   pulsate mattress    Orders Updated  WO Nurse follow-up plan:weekly  Nursing to notify the Provider(s) and re-consult the WO Nurse if wound(s) deteriorates or new skin concern.    Patient History  According to provider note(s):   Ron Gilmore is a 80 year old male admitted on 5/18/2021.  Complex past medical history of CVA with residual left-sided weakness, COPD, chronic urinary retention with chronic indwelling Cardona catheter, history of septic shock secondary to E. 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  Also history of dyslipidemia, depression, diabetes mellitus type 2 diet-controlled and dysphagia and recent COVID-19 infection and recent admission to [facility] from 05/10 to 05/17 with acute diastolic congestive heart failure, hypoxic respiratory failure, possible pneumonia.  He is re admitted on 5/18/2021 with fever and possible worsening of oxygenation.    Objective Data  Containment of urine/stool: Cardona for UIC and  incontinence protocol for FIC    Active Diet Order  Orders Placed This Encounter      Combination Diet Dysphagia Diet Level 2: Mechan Altered; Nectar Thickened Liquids (pre-thickened or use instant food thickener) (by tsp); 2 gm NA Diet      Output:   I/O last 3 completed shifts:  In: 220 [P.O.:220]  Out: 1075 [Urine:1075]    Risk Assessment:   Sensory Perception: (P) 3-->slightly limited  Moisture: (P) 2-->very moist  Activity: (P) 1-->bedfast  Mobility: (P) 2-->very limited  Nutrition: (P) 2-->probably inadequate  Friction and Shear: (P) 1-->problem  Lon Score: (P) 11                          Labs:   Recent Labs   Lab 06/02/21  0627 05/30/21  0628 05/30/21  0628   ALBUMIN  --   --  1.8*   HGB 8.5*   < > 8.7*   WBC 6.9   < >  --     < > = values in this interval not displayed.       Physical Exam  Areas of skin assessed: Coccyx and buttocks   [facility] photo June 2, 2021      [facility] photo  Buttocks:  2-9-21                            [facility] Photo: buttocks 5-24-21            Midline to right buttocks is a large are of very partial thickness epidermal denudement, moist red base, lightly bloody tissue and a bit macerated looking.  Greater area of scattering of scar tissue and darker, permanent skin changes that is all blanchable.     Interventions  Visual inspection and assessment completed hands are new due to vasopressors sacrum on admission   Wound Care: done per plan of care  Supplies: In floor supply room   Current off-loading measures: pillows  Current support surface:Pulsate air, pt turns with assist x 1  Education provided to: importance of repositioning, plan of care and Off-loading pressure  Discussed plan of care with patient /Nurse and pt    Leonidas Jimenez RN CWOCN

## 2021-06-02 NOTE — PROGRESS NOTES
PULMONARY CONSULT NOTE    Full consult dictated.    Active Problems:    Acute and chronic respiratory failure with hypoxia (H)           Assessment and Plan:      80-year-old obese male former smoker with multiple medical problems including COPD, obstructive sleep apnea noncompliant with CPAP, prior CVA complicated by residual left-sided weakness and dysphagia, recent diagnosis of COVID-19 5/1/2021 and recent hospitalization at [facility] 5/10-5/17/2021 for acute hypoxemic respiratory failure secondary to diastolic CHF and possible aspiration pneumonia is readmitted 1 day after discharge for fever and worsening hypoxemia.  Chest x-ray on admission showed similar interstitial changes with some improvement in the previously seen infiltrates.  CT chest on admission negative for PE, showed a few patchy opacities in the dependent portions of the lower lobes superimposed on moderate emphysematous changes.  Venous Doppler ultrasound of the lower extremities showed a right calf DVT for which the patient underwent IVC filter placement.  Hospital course complicated by ongoing hypoxemia.  Repeat CT chest 5/31 showed bilateral pulmonary infiltrates most prominent in the left lower lobe with consolidation.  Fever has resolved with empiric antibiotics and oxygenation has improved.  Suspect residual pulmonary infiltrates and hypoxemia most likely secondary to chronic aspiration with perhaps some residual from recent COVID-19 viral infection.  Agree with current bronchodilators, pulmonary toilet and reevaluation of swallow status.  Respiratory status is currently stable on low-flow oxygen.    Diagnoses  Abnl CT/CXR  R91.8  COPD   J44.9  Hypoxemia  R09.02  Narayan depend history Z87.891  Pneum aspiration J69.0  Resp fail acute J96.00  Sleep apnea obstr G47.33  SOB   R06.02    PLAN:  1. Adjust oxygen, keep SaO2 > 90%  2. Bronchodilators - Combivent Respimat  3. Antibiotics - Rocephin.  4. Steroids - consider course of Prednisone  if hypoxemia worsens.  5. Diuresis - Lasix as ordered.  6. Anticoagulation - Lovenox  7. Dysphagia diet, aspiration precautions.    Thanks, will follow.      Giovani Kelly MD    [facility]  [phone] (pager)  [phone] (office)

## 2021-06-03 ENCOUNTER — TELEPHONE (OUTPATIENT)
Dept: OTHER | Facility: CLINIC | Age: 81
End: 2021-06-03

## 2021-06-03 VITALS
RESPIRATION RATE: 16 BRPM | OXYGEN SATURATION: 91 % | BODY MASS INDEX: 29.09 KG/M2 | HEART RATE: 75 BPM | DIASTOLIC BLOOD PRESSURE: 62 MMHG | SYSTOLIC BLOOD PRESSURE: 109 MMHG | TEMPERATURE: 97.9 F | WEIGHT: 214.51 LBS

## 2021-06-03 LAB
ANION GAP SERPL CALCULATED.3IONS-SCNC: 4 MMOL/L (ref 3–14)
BUN SERPL-MCNC: 9 MG/DL (ref 7–30)
CALCIUM SERPL-MCNC: 8.1 MG/DL (ref 8.5–10.1)
CHLORIDE SERPL-SCNC: 108 MMOL/L (ref 94–109)
CO2 SERPL-SCNC: 30 MMOL/L (ref 20–32)
CREAT SERPL-MCNC: 1 MG/DL (ref 0.66–1.25)
ERYTHROCYTE [DISTWIDTH] IN BLOOD BY AUTOMATED COUNT: 20.8 % (ref 10–15)
GFR SERPL CREATININE-BSD FRML MDRD: 71 ML/MIN/{1.73_M2}
GLUCOSE SERPL-MCNC: 83 MG/DL (ref 70–99)
HCT VFR BLD AUTO: 28.5 % (ref 40–53)
HGB BLD-MCNC: 8.2 G/DL (ref 13.3–17.7)
INTERPRETATION ECG - MUSE: NORMAL
MCH RBC QN AUTO: 27.7 PG (ref 26.5–33)
MCHC RBC AUTO-ENTMCNC: 28.8 G/DL (ref 31.5–36.5)
MCV RBC AUTO: 96 FL (ref 78–100)
PLATELET # BLD AUTO: 191 10E9/L (ref 150–450)
POTASSIUM SERPL-SCNC: 3.4 MMOL/L (ref 3.4–5.3)
RBC # BLD AUTO: 2.96 10E12/L (ref 4.4–5.9)
SODIUM SERPL-SCNC: 142 MMOL/L (ref 133–144)
WBC # BLD AUTO: 6.3 10E9/L (ref 4–11)

## 2021-06-03 PROCEDURE — 250N000013 HC RX MED GY IP 250 OP 250 PS 637: Performed by: INTERNAL MEDICINE

## 2021-06-03 PROCEDURE — 250N000013 HC RX MED GY IP 250 OP 250 PS 637: Performed by: HOSPITALIST

## 2021-06-03 PROCEDURE — 99239 HOSP IP/OBS DSCHRG MGMT >30: CPT | Performed by: HOSPITALIST

## 2021-06-03 PROCEDURE — 80048 BASIC METABOLIC PNL TOTAL CA: CPT | Performed by: HOSPITALIST

## 2021-06-03 PROCEDURE — 85027 COMPLETE CBC AUTOMATED: CPT | Performed by: HOSPITALIST

## 2021-06-03 PROCEDURE — 36415 COLL VENOUS BLD VENIPUNCTURE: CPT | Performed by: HOSPITALIST

## 2021-06-03 PROCEDURE — 250N000013 HC RX MED GY IP 250 OP 250 PS 637: Performed by: PHYSICIAN ASSISTANT

## 2021-06-03 RX ORDER — POTASSIUM CHLORIDE 1500 MG/1
40 TABLET, EXTENDED RELEASE ORAL ONCE
Status: COMPLETED | OUTPATIENT
Start: 2021-06-03 | End: 2021-06-03

## 2021-06-03 RX ORDER — PANTOPRAZOLE SODIUM 40 MG/1
40 TABLET, DELAYED RELEASE ORAL
DISCHARGE
Start: 2021-06-03 | End: 2023-01-19

## 2021-06-03 RX ADMIN — ALLOPURINOL 300 MG: 300 TABLET ORAL at 09:25

## 2021-06-03 RX ADMIN — METOPROLOL TARTRATE 12.5 MG: 25 TABLET, FILM COATED ORAL at 09:24

## 2021-06-03 RX ADMIN — PANTOPRAZOLE SODIUM 40 MG: 40 TABLET, DELAYED RELEASE ORAL at 09:23

## 2021-06-03 RX ADMIN — MULTIPLE VITAMINS W/ MINERALS TAB 1 TABLET: TAB at 09:24

## 2021-06-03 RX ADMIN — IPRATROPIUM BROMIDE AND ALBUTEROL 1 PUFF: 20; 100 SPRAY, METERED RESPIRATORY (INHALATION) at 12:48

## 2021-06-03 RX ADMIN — POLYETHYLENE GLYCOL 3350 17 G: 17 POWDER, FOR SOLUTION ORAL at 09:23

## 2021-06-03 RX ADMIN — AMOXICILLIN AND CLAVULANATE POTASSIUM 1 TABLET: 875; 125 TABLET, FILM COATED ORAL at 09:24

## 2021-06-03 RX ADMIN — POTASSIUM CHLORIDE 40 MEQ: 1500 TABLET, EXTENDED RELEASE ORAL at 12:33

## 2021-06-03 RX ADMIN — PAROXETINE HYDROCHLORIDE 20 MG: 20 TABLET, FILM COATED ORAL at 09:24

## 2021-06-03 RX ADMIN — ATORVASTATIN CALCIUM 10 MG: 10 TABLET, FILM COATED ORAL at 09:24

## 2021-06-03 RX ADMIN — ASPIRIN 81 MG: 81 TABLET, COATED ORAL at 09:27

## 2021-06-03 RX ADMIN — IPRATROPIUM BROMIDE AND ALBUTEROL 1 PUFF: 20; 100 SPRAY, METERED RESPIRATORY (INHALATION) at 09:27

## 2021-06-03 RX ADMIN — Medication 500 MCG: at 09:24

## 2021-06-03 ASSESSMENT — ACTIVITIES OF DAILY LIVING (ADL)
ADLS_ACUITY_SCORE: 26

## 2021-06-03 NOTE — CONSULTS
Care Management Discharge Note    Discharge Date: 06/03/21  Expected Time of Departure: 1400    Discharge Disposition: Transitional Care(Pt from [facility] and agreeable to returning)    Discharge Services: None    Discharge DME: None    Discharge Transportation: agency([facility] stretcher transport)    Private pay costs discussed: transportation costs    PAS Confirmation Code:  NA, not needed due to return to TCU facility  Patient/family educated on Medicare website which has current facility and service quality ratings: yes    Education Provided on the Discharge Plan:  Yes  Persons Notified of Discharge Plans: pt, pt's daughter, hospitalist, [facility] , Mamta in admissions at [facility]  Patient/Family in Agreement with the Plan: yes    Handoff Referral Completed: Yes    Additional Information:  JEROME spoke with pt's daughter López and confirmed a plan of pt to return to [facility], so pt can have visits from his wife. López reports it has put mental strain on pt since he has not seen his wife in a month. López reports she would like to visit at [facility] as well as her mother. JEROME informed López [facility] is allowing two visitors at a time as long as they have the proper PPE. [facility] can provide face nair for visits. López had concerns about pt's wife getting around in her wheelchair. López reports she would most likely visit pt at the same time, so she would push pt around. JEROME informed Mamta in [facility] admissions of the pt's wish to return and family's want to visit. Mamta confirmed their visitation policy is the same in regards to visitors and PPE. Mamta reports if pt's wife comes to visit without daughter they have staff who can wheel her to pt's room. Mamta also reports they have a visitor w/c that pt's wife may use. JEROME placed call to [facility] [employer] and scheduled a discharge stretcher transport. Pt in need of stretcher transport due to no trunk support, oxygen he cannot manage, and a coccyx wound the he should not sit directly on. JEROME completed  PCS form, faxed it, and placed it on chart. JEROME followed up with López regarding transport time. JEROME called Mamta with transport time. JEROME left a VM for Roma regarding the discharge plan and LTC plan for future. JEROME updated hospitalist on discharge time. Pt would not need a PAS since he is returning to [facility]. Pt is on a bed hold.     Pt to discharge back to [facility] today at 1400 via [facility] stretcher transport. Pt is in need of a stretcher due to no trunk support, coccyx wound, and new oxygen he is unsure on how to manage.      TIFFANY Ruiz

## 2021-06-03 NOTE — TELEPHONE ENCOUNTER
Called to schedule patient's Consult Appointment. Patient's wife (Yuli) stated that at 2:00pm Ron was being transferred from the hospital to [facility]. She did not have any contact information or numbers at this time and is fine with us calling her back tomorrow. (06/04/21)

## 2021-06-03 NOTE — PLAN OF CARE
Occupational Therapy Discharge Summary    Reason for therapy discharge:    Discharged to transitional care facility.    Progress towards therapy goal(s). See goals on Care Plan in [facility] electronic health record for goal details.  Goals not met.  Barriers to achieving goals:   discharge from facility.    Therapy recommendation(s):    Continued therapy is recommended.  Rationale/Recommendations:  maximize safety and independence with ADLs.

## 2021-06-03 NOTE — PROGRESS NOTES
JEROME  D: Call from pt's [facility]  Roma Zachery. Roma can be reached at [phone]. Roma reports she is working towards placement for a LTC unit for pt and his wiofe. Roma reports she has been working with pt's daughter, López. Roma reports López would like her parents placed in the  [facility], and [place] area. JEROME and Roma discussed [facility] as a potential option for placement if they have beds. Pt would apply for MA soon. Roma would like to see pt attend TCU and then transfer to that facility's LTC if possible. SW unsure where has LTC openings. SW was unsure of placements that had both openings.     JEROME spoke with pt's daughter,López regarding placement. López reports the plan is LTC for pt and his wife. López reports the fact that pt has not been able to see his wife in over a month has really put some mental strain on

## 2021-06-03 NOTE — PLAN OF CARE
A&O x4, VSS on 3.5L O2. Pain denied. Tele: NSR. DD2 diet with nectar thick liquids well tolerated. Turn and repo q 2hr. Coccyx wound LUIS FERNANDO as per orders. LS: coarse/diminished. BS: audible. Cardona patent with adequate output. +flatus, +BM. Up A-2 and lift.

## 2021-06-03 NOTE — PLAN OF CARE
A&O x4. VSS. Lungs diminished. BS+, BM-, flatus-. Tolerating NDD2 nectar thick liquids diet. - N/V. Voiding good UOP. Turn / repo.

## 2021-06-03 NOTE — DISCHARGE SUMMARY
[facility]  Hospitalist Discharge Summary      Date of Admission:  5/18/2021  Date of Discharge:  6/3/2021  Discharging Provider: Rg Tobin DO      Discharge Diagnoses   Acute on chronic hypoxic respiratory failure  Sepsis from HCAP  Suspected pulmonary embolism s/p IVC filter 5/24  Chronic obstructive lung disease/emphysema  Sleep disordered breathing   Recent COVID-19 infection/COVID-recovered  Right peroneal vein DVT  Acute metabolic encephalopathy  hypernatremia    Follow-ups Needed After Discharge   Follow-up Appointments     Follow Up and recommended labs and tests      Follow up with group home physician.  The following labs/tests are   recommended: bmp and cbc in 1 week.    Follow up with [facility] in 2-4 weeks for consideration of capsule   endoscopy    See vascular medicine in 2-4 weeks for continued evaluation of the deep   vein thrombosis and filter             Unresulted Labs Ordered in the Past 30 Days of this Admission     No orders found from 4/18/2021 to 5/19/2021.      These results will be followed up by PCP    Discharge Disposition   Discharged to rehabilitation facility  Condition at discharge: Stable    Hospital Course   Acute on chronic hypoxic respiratory failure  Sepsis from HCAP  Suspected pulmonary embolism s/p IVC filter 5/24  Chronic obstructive lung disease/emphysema  Sleep disordered breathing   Recent COVID-19 infection/COVID-recovered  Right peroneal vein DVT  CT negative for pulmonary embolism in main arteries but segmental or smaller not ruled out due to motion artifact.  D-dimer 1.7. Lungs showed emphysema and patchy lower lobe infiltrates but small.  No pulmonary edema.  Pulmonary embolism is still a concern especially with the right calf deep venous thrombosis. CT does show emphysema and bilateral lower lobe infiltrates.  He was initially febrile but after starting antibiotics, he has no more fever.  No leukocytosis and pro-calcitonin is  undetectable.  weaned from HFNC 5/22  Lovenox held 5/23 due to probable GIB and underwent IVC filter placement 5/24  * Completed 10 days of zosyn on 5/28 for probable pneumonia  * Oxygen needs have increased as of 5/29 upto 6L and to 50%FiO2 on high flow  ABG notes worsening hypoxemia  CT CAP with extensive bilateral infiltrates  O2 improving to 2 liters and stable  Most likely acute worsening is due to possible aspiration on top of prior infiltrates  Plan  - resume lasix  - O2 stable at 2-3 liters  - pulmonary consult appreciated, continue plan of care   - SLP reconsulted: continue plan of care  - Continue inhaled bronchodilators   - finish empiric course of antibiotics for aspiration pneumonia with augmentin  - follow-up with vascular medicine as outpatient for further anticoagulation/IVC filter management     chronic chirinos catheterization  UA on 5/30 with large amount of WBC, had been changed previously this admission  No anatomic abnormalities on ct  Plan  - culture with yeast no definite treatment        Acute metabolic encephalopathy  hypernatremia  Patient has been confused since late afternoon of 5/29.  Seems to be oriented to place and time at times, then talks something noon-sense. This could be due to hypoxia as above vs delirium vs other  - managing hypoxia as above  - up in a chair, frequent orientation     Acute on chronic anemia due to probable upper GIB  Hgb 3/2021 wnl but fell to about 10 g/dL following hospitalization that month and stable at admission, but trended down over several days with RN noting melanic stool 5/23.  Hx colon polyps on colonoscopy in 2006, no prior hx GIB.   *GI followed; initially EGD held due to resp status, then he declined EGD once resp status improved. Due to decreasing hgb, GI recosult on 5/28 and EGD was completed which was normal finding.   Colonoscopy 5/29-- normal mucosa of the colon, diverticulosis. Discussed with Dr. Davis, possible capsule endoscopy as  outpatient  plan  - received 1unit PRBC 5/24 for hgb 6.6 and another unit on 5/28 for hgb of 6.9  - received venofer x 2 doses  - follow-up with [facility] as outaptient for consideration of pillcam  - continue PPI PO BID  - resumed ASA as above     AIDA  Likely combination of diuresis for resp failure and GIB.  Baseline Cr 0.7.   - resolving. Repeat bmp in 1 week  - encourage oral hydration     Hypernatremia (resolved)  Due to poor oral intake and possible component of IVF  - repeat daily  - encourage oral hydration      Hypokalemia  - recheck bmp in 1 week     Chronic indwelling urinary catheter  Urine culture growing Candida, as currently afebrile with negative blood cultures will not treat.     Continue catheter      Hypertension   Intermittent atrial fibrillation not on anticoagulation   Chronic diastolic congestive heart failure   Appears compensated.  BNP down from 4500 at recent discharge to 800.  No pulmonary edema on CT scan.  Was not previously on anticoagulation for A-fib.   - continue with Metoprolol BID  - ASA resumed as above     Type 2 diabetes mellitus     Stable, continue dietary control      Depression     Continue paroxetine      Stroke history   He was not on anticoagulation for atrial fibrillation but is currently being anticoagulated for the deep venous thrombosis.  Wheelchair bound at baseline.  - Continue statin  - ASA resumed on 5/30     Severe malnutrition  In the context of acute illness.Chronic illness or disease.   - nutrition following          Consultations This Hospital Stay   RESPIRATORY CARE IP CONSULT  CARE MANAGEMENT / SOCIAL WORK IP CONSULT  SPEECH LANGUAGE PATH ADULT IP CONSULT  GASTROENTEROLOGY IP CONSULT  WOUND OSTOMY CONTINENCE NURSE  IP CONSULT  GASTROENTEROLOGY IP CONSULT  PULMONARY IP CONSULT  PHYSICAL THERAPY ADULT IP CONSULT  OCCUPATIONAL THERAPY ADULT IP CONSULT  SPEECH LANGUAGE PATH ADULT IP CONSULT  CARE MANAGEMENT / SOCIAL WORK IP CONSULT  PHYSICAL THERAPY ADULT IP  CONSULT  OCCUPATIONAL THERAPY ADULT IP CONSULT  SPEECH LANGUAGE PATH ADULT IP CONSULT    Code Status   Full Code    Time Spent on this Encounter   I, Rg Tobin DO, personally saw the patient today and spent greater than 30 minutes discharging this patient.       Rg Tobin DO  [place] [address] [facility]  [address] [place] [address]  Phone: [phone]  ______________________________________________________________________    Physical Exam   Vital Signs: Temp: 98  F (36.7  C) Temp src: Oral BP: 116/72 Pulse: 87   Resp: 18 SpO2: 95 % O2 Device: Nasal cannula Oxygen Delivery: 3 LPM  Weight: 214 lbs 8.12 oz  General Appearance:  Alert, awake and disoriented  Respiratory: diminished breath sounds at the bases,  upper lung fields are clear to auscultation  Cardiovascular: regular rate and rhythm  GI: soft, active tenderness, no pain with palpation.   Skin: warm and dry             Primary Care Physician   Augustin Thomas    Discharge Orders      Vascular Medicine Referral      General info for SNF    Length of Stay Estimate: Long Term Care  Condition at Discharge: Stable  Level of care:skilled then to board and care  Rehabilitation Potential: Poor  Admission H&P remains valid and up-to-date: Yes  Recent Chemotherapy: N/A  Use Nursing Home Standing Orders: N/A     Mantoux instructions    Give two-step Mantoux (PPD) Per Facility Policy Yes     Reason for your hospital stay    pneumonia     Glucose monitor nursing POCT    Before meals and at bedtime     Follow Up and recommended labs and tests    Follow up with [facility] physician.  The following labs/tests are recommended: bmp and cbc in 1 week.    Follow up with [facility] in 2-4 weeks for consideration of capsule endoscopy    See vascular medicine in 2-4 weeks for continued evaluation of the deep vein thrombosis and filter     Activity - Up ad osmany     Physical Therapy Adult Consult    Evaluate and treat as  clinically indicated.    Reason:  stroke     Occupational Therapy Adult Consult    Evaluate and treat as clinically indicated.    Reason:  stroke     Speech Language Path Adult Consult    Evaluate and treat as clinically indicated.    Reason:  stroke     Oxygen Adult/Peds    Oxygen Documentation:   I certify that this patient, Ron Gilmore has been under my care (or a nurse practitioner or physican's assistant working with me). This is the face-to-face encounter for oxygen medical necessity.      Ron Gilmore is now in a chronic stable state and continues to require supplemental oxygen. Patient has continued oxygen desaturation due to Chronic Respiratory Failure with Hypoxia J93.11.    Alternative treatment(s) tried or considered and deemed clinically infective for treatment of Chronic Respiratory Failure with Hypoxia J93.11 include nebulizers, inhalers and steroids.  If portability is ordered, is the patient mobile within the home? yes    **Patients who qualify for home O2 coverage under the CMS guidelines require ABG tests or O2 sat readings obtained closest to, but no earlier than 2 days prior to the discharge, as evidence of the need for home oxygen therapy. Testing must be performed while patient is in the chronic stable state. See notes for O2 sats.**     Advance Diet as Tolerated    Follow this diet upon discharge: Orders Placed This Encounter      Snacks/Supplements Adult: Gelatein sugar-free; With Meals      Combination Diet Dysphagia Diet Level 2: Mechan Altered; Nectar Thickened Liquids (pre-thickened or use instant food thickener) (by tsp); 2 gm NA Diet       Significant Results and Procedures   Most Recent 3 CBC's:  Recent Labs   Lab Test 06/03/21  0603 06/02/21  0627 06/01/21  0643   WBC 6.3 6.9 7.6   HGB 8.2* 8.5* 8.3*   MCV 96 96 96    187 157     Most Recent 3 BMP's:  Recent Labs   Lab Test 06/03/21  0603 06/02/21  0627 06/01/21  0643    141 141   POTASSIUM 3.4 3.4 3.4   CHLORIDE  108 108 109   CO2 30 30 28   BUN 9 10 11   CR 1.00 1.06 1.10   ANIONGAP 4 3 4   RAJ 8.1* 8.2* 8.1*   GLC 83 80 90     Most Recent 2 LFT's:  Recent Labs   Lab Test 05/30/21  0628 05/10/21  2110   AST 19 35   ALT 12 37   ALKPHOS 67 88   BILITOTAL 1.7* 0.5     Most Recent 3 INR's:  Recent Labs   Lab Test 03/12/21  1128 03/10/21  1420 01/14/20  2356   INR 1.20* 1.58* 1.13   ,   Results for orders placed or performed during the hospital encounter of 05/18/21   XR Chest Port 1 View    Narrative    CHEST ONE VIEW  5/18/2021 7:53 PM     HISTORY: Respiratory distress.    COMPARISON: May 15, 2021      Impression    IMPRESSION: Similar interstitial change, previously seen infiltrates  appear improved.    BRISSA MENESES MD   CT Chest Pulmonary Embolism w Contrast    Narrative    EXAM: CT CHEST WITH CONTRAST - PULMONARY EMBOLISM PROTOCOL   LOCATION: [facility]  DATE/TIME: 5/18/2021 10:01 PM    INDICATION: Respiratory distress. Positive D-dimer.  COMPARISON: 05/11/2021 - CT chest, abdomen and pelvis.    TECHNIQUE: CT angiogram chest during pulmonary arterial phase injection IV contrast. Dose reduction techniques were used.   CONTRAST: 79mL [id number]    FINDINGS:    ANGIOGRAM CHEST: Note the detection of segmental and subsegmental pulmonary emboli is limited due to motion artifact. No visualized embolus within the main or lobar pulmonary arteries. The thoracic aorta is normal in caliber. Atherosclerotic   calcification in the thoracic aorta.    LUNGS AND PLEURA: Moderate emphysematous changes in the lungs. Several irregular curvilinear opacities in the posterior aspects of the upper and mid lungs bilaterally are again noted and most likely represent scarring. A few patchy opacities are present   in the dependent aspects of bilateral lower lobes.    MEDIASTINUM/AXILLAE: A few nonspecific borderline enlarged mediastinal lymph nodes, not convincingly changed. For example, there is a 2.0 x 0.9 cm lower right paratracheal  lymph node (series 4 image 41).    CORONARY ARTERY CALCIFICATION: Present.    MUSCULOSKELETAL: No acute findings.    UPPER ABDOMEN: Prior cholecystectomy.      Impression    IMPRESSION:   1. A few patchy opacities in the dependent aspects of bilateral lower lobes could represent atelectasis or pneumonia.  2. No visualized embolus in the main or lobar pulmonary arteries. Detection of segmental and subsegmental pulmonary emboli is limited on this study due to motion artifact.  3. Moderate emphysematous changes in the lungs.   US Lower Extremity Venous Duplex Bilateral    Narrative    ULTRASOUND VENOUS LOWER EXTREMITY BILATERAL   5/19/2021 3:49 PM     HISTORY: Positive d-dimer. COVID  +    COMPARISON: None.    TECHNIQUE: Ultrasound gray scale, Color Doppler flow, and spectral  Doppler waveform analysis performed.    FINDINGS: The right common femoral, deep profunda femoral, right  femoral and right popliteal veins are compressible with no evidence of  DVT. The right peroneal vein is noncompressible thrombus is  identified. Findings consistent with calf DVT.    The left common femoral, superficial femoral, popliteal and  segmentally visualized calf veins are patent and fully compressible  and demonstrate normal venous Doppler flow     The visualized greater saphenous veins are negative for thrombus.       Impression    IMPRESSION:   Thrombus identified right peroneal vein, findings consistent with  right calf DVT.      The findings were called to the patient's nurse Juli 5/19/2031 at  4:15 PM.    ABRAM PIZANO MD   IR IVC Filter Placement    Narrative    LOCATION: [facility]  DATE: 5/24/2021    PROCEDURES:  1.  INFERIOR VENACAVOGRAM  2.  RETRIEVABLE INFERIOR VENA CAVA FILTER PLACEMENT  3.  MODERATE SEDATION    INDICATION: Deep vein thrombosis with contraindication to  anticoagulation.    SEDATION: During the time-out, immediately prior to administration of  medications, the patient was reassessed for  adequacy to receive  conscious sedation. Versed 1 mg and fentanyl 50 mcg were administered  intravenously with continuous vital signs monitoring by the radiology  nurse under my direct supervision. Total face to face moderate  sedation time: 9 minutes.     ADDITIONAL MEDICATIONS: None.  FLUOROSCOPIC TIME: 1.3 mins.  DOSE: Air Kerma: 32 mGy.  CONTRAST: 20 cc.    PROCEDURE:   After obtaining informed written and oral consent the patient was  prepped and draped in a sterile fashion. Prior to the procedure  patency of the right femoral  vein was confirmed with ultrasound and  images recorded.   The right groin  was prepped and draped in the usual fashion and  anesthetized with 1% lidocaine.  Using real-time ultrasound guidance,  the right femoral  vein was then punctured and a sheath placed. A 9  [place] sheath was advanced into the inferior vena cava..  An IVC gram  was  performed . The [place] retrievable IVC filter was  deployed in  the infrarenal IVC.  A follow up IVC gram was performed through  to  document  position of the filter.  The patient tolerated the procedure  well without immediate complication.     FINDINGS:   The IVC-gram shows the IVC to be normal in caliber and free of filling  defects.  The renal veins are visualized bilaterally.  Following  placement of the IVC filter, the follow-up IVC gram performed shows  the filter to be in good position in the infrarenal IVC.      Impression    IMPRESSION:   1.  NORMAL IVC WITH NO FILLING DEFECTS.    2.  PLACEMENT OF  LUNA RETRIEVABLE INFRARENAL IVC FILTER.    SCHEDULE REMOVAL OF THE IVC FILTER BY CONTACTING INTERVENTIONAL  RADIOLOGY.      ABRAM PIZANO MD   XR Chest Port 1 View    Narrative    EXAM: XR CHEST PORT 1 VIEW  LOCATION: [facility]  DATE/TIME: 5/29/2021 11:23 PM    INDICATION: Difficulty breathing.  COMPARISON: CTA chest 05/18/2021. Chest radiograph 05/18/2021.      Impression    IMPRESSION: Low lung volumes. Mild interstitial  prominence. Mild bibasilar atelectasis. No significant pleural fluid. No pneumothorax. Stable mild cardiomegaly. Cardiac rhythm monitoring device implanted in the left chest wall.   CT Chest Abdomen Pelvis w/o Contrast    Narrative    CT CHEST/ABDOMEN/PELVIS WITHOUT CONTRAST 5/31/2021 1:11 PM    CLINICAL HISTORY: Respiratory distress, recurrent acute injury,  history of obstructing urinary stones.    TECHNIQUE: CT scan of the chest, abdomen, and pelvis was performed  without IV contrast. Multiplanar reformats were obtained. Dose  reduction techniques were used.   CONTRAST: None    COMPARISON: CT of the chest from 5/18/2021, chest/abdomen/pelvis CT  from 5/11/2021.    FINDINGS:   LUNGS AND PLEURA: Extensive left lower lobe infiltrate/consolidation.  Patchy moderate right lower lobe infiltrate. Mild-moderate bilateral  upper lobe infiltrates dependently. Moderate emphysema. No effusions.    MEDIASTINUM/AXILLAE: No lymphadenopathy. No thoracic aortic aneurysms.  There are extensive coronary vascular calcifications and/or stents  consistent with coronary artery disease.    HEPATOBILIARY: Cholecystectomy. Unremarkable liver.    PANCREAS: No significant mass, duct dilatation, or inflammatory  change.    SPLEEN: Normal size.    ADRENAL GLANDS: No significant nodules.    KIDNEYS/BLADDER: 2 mm nonobstructing stone in the mid right kidney. No  other renal, ureteral, or bladder stones. No hydronephrosis.    BOWEL: No obstruction or inflammatory change.    PELVIC ORGANS: No pelvic masses.    ADDITIONAL FINDINGS: No ascites.    MUSCULOSKELETAL: Unremarkable.      Impression    IMPRESSION:    1. Extensive bilateral pulmonary infiltrates.  2. 2 mm nonobstructing stone on the right, no other renal, ureteral,  or bladder stones. No hydronephrosis to suggest obstruction.    BRISSA MENESES MD       Discharge Medications   Current Discharge Medication List      START taking these medications    Details   amoxicillin-clavulanate  (AUGMENTIN) 875-125 MG tablet Take 1 tablet by mouth every 12 hours  Qty:      Associated Diagnoses: Acute and chronic respiratory failure with hypoxia (H)      pantoprazole (PROTONIX) 40 MG EC tablet Take 1 tablet (40 mg) by mouth 2 times daily (before meals)  Qty:      Associated Diagnoses: Acute and chronic respiratory failure with hypoxia (H)         CONTINUE these medications which have CHANGED    Details   aspirin (ASA) 81 MG EC tablet Take 1 tablet (81 mg) by mouth daily  Qty:      Associated Diagnoses: Acute and chronic respiratory failure with hypoxia (H)         CONTINUE these medications which have NOT CHANGED    Details   allopurinol (ZYLOPRIM) 300 MG tablet Take 300 mg by mouth daily      atorvastatin (LIPITOR) 10 MG tablet Take 10 mg by mouth daily      cyanocobalamin (VITAMIN B-12) 500 MCG SUBL sublingual tablet Place 1 tablet (500 mcg) under the tongue daily  Qty:      Associated Diagnoses: Vitamin B12 deficiency (non anemic)      furosemide (LASIX) 40 MG tablet Take 1 tablet (40 mg) by mouth daily    Associated Diagnoses: Acute diastolic congestive heart failure (H)      ipratropium-albuterol (COMBIVENT RESPIMAT)  MCG/ACT inhaler Inhale 1 puff into the lungs 4 times daily 0800, 1200 ,1600 ,2000      metoprolol tartrate (LOPRESSOR) 25 MG tablet Take 0.5 tablets (12.5 mg) by mouth 2 times daily    Associated Diagnoses: Cerebrovascular accident (CVA), unspecified mechanism (H)      PARoxetine (PAXIL) 20 MG tablet Take 20 mg by mouth daily      polyethylene glycol (MIRALAX) 17 GM/Dose powder Take 17 g by mouth daily  Qty: 510 g    Associated Diagnoses: Constipation, unspecified constipation type      acetaminophen (TYLENOL) 325 MG tablet Take 650 mg by mouth every 4 hours as needed for mild pain as needed for pain/fever Max dose 3000mg/24hr      Emollient (AMLACTIN ULTRA EX) Apply topically daily as needed TO FEET           Allergies   No Known Allergies

## 2021-06-03 NOTE — PLAN OF CARE
: A&O x4, on 2L/NC, sat in the 90s. Tele SR w/ BBB. Encourage CDB & IS use. Turned/ repositioned q 2hrs. Adequate urine via chirinos cath. Non blanchable coccyx LUIS FERNANDO. Up with 2 assist & lift device. LE edema-elevated. Denies pain. Plans for possible discharge to TCU tomorrow.

## 2021-06-03 NOTE — PLAN OF CARE
[id number]:   A&O. VSS. DD2 diet, nectar thick liquids.  Up with lift, left sided weakness from hx of stroke. Denies pain. Pt scoring green on the Aggression Stop Light Tool.   Discharge    Patient discharged to [facility] via stretcher    Listed belongings gathered and returned to patient. Yes, including his cell phone  Belongings returned to patient from security/pharmacy Yes  Care Plan and Patient education resolved: Yes  Prescriptions if needed, hard copies sent with patient  NA  Home and hospital acquired medications returned to patient: NA  Medication Bin checked and emptied on discharge Yes  F

## 2021-06-03 NOTE — TELEPHONE ENCOUNTER
Note:  Referred to [facility] by Rg Tobin DO for DVT, GI bleeding, status post IVC filter, anticoagulation management     IP @ [facility] 5/18/21 to 6/3/21  Per Dr. Tobin's note: follow-up with vascular medicine as outpatient for further anticoagulation/IVC filter management.    Discharged on ASA 81 mg daily.  Lovenox was discontinued 5/23/21 d/t GI bleed.      Pt needs to be scheduled for new patient consult with Vascular Medicine.  Will route to scheduling to coordinate an appointment at next available.    Ramya Cid, LOANN, RN  [facility]

## 2021-06-03 NOTE — PROGRESS NOTES
PULMONOLOGY PROGRESS NOTE    Date of Admission: 5/18/2021    CC/Reason for Hospital visit: COPD, BRAD, respiratory failure  SUBJECTIVE      Events of last night reviewed. Stable night. No new respiratory problems. States breathing is about the same today.    ROS: A Problem Pertinent review of systems was negative except for items noted in HPI.  Past Medical, Family, and Social/Substance History has been reviewed: No interval changes.    OBJECTIVE   Vital signs:  Temp: 98  F (36.7  C) Temp src: Oral BP: 116/72 Pulse: 87   Resp: 18 SpO2: 95 % O2 Device: Nasal cannula Oxygen Delivery: 3 LPM   Weight: 97.3 kg (214 lb 8.1 oz)  Estimated body mass index is 29.09 kg/m  as calculated from the following:    Height as of an earlier encounter on 5/18/21: 1.829 m (6').    Weight as of this encounter: 97.3 kg (214 lb 8.1 oz).        I/O last 3 completed shifts:  In: 60 [P.O.:60]  Out: 1275 [Urine:1275]      CONSTITUTIONAL/GENERAL APPEARANCE: Alert male. No Apparent Distress.  PSYCHIATRIC: Pleasant and appropriate mood and affect. Oriented x 3.  EARS, NOSE,THROAT,MOUTH: External ears and nose overall normal. Normal oral mucosa.   NECK: Neck appearance normal. No neck masses and the thyroid is not enlarged.   RESPIRATORY: Non-labored effort. Decreased BS anteriorly.  CARDIOVASCULAR: S1, S2, regular rate and rhythm.      LABORATORY ASSESSMENT    Arterial Blood Gas  Recent Labs   Lab 05/30/21  1118 05/30/21  0740 05/29/21  2330   PH 7.39 7.41 7.49*   PCO2 49* 48* 37   PO2 132* 67* 50*   HCO3 30* 30* 28   O2PER  --  6L  --      CBC  Recent Labs   Lab 06/03/21  0603 06/02/21  0627 06/01/21  0643 05/31/21  1133   WBC 6.3 6.9 7.6 7.5   RBC 2.96* 3.00* 2.93* 2.84*   HGB 8.2* 8.5* 8.3* 8.2*   HCT 28.5* 28.7* 28.2* 27.7*   MCV 96 96 96 98   MCH 27.7 28.3 28.3 28.9   MCHC 28.8* 29.6* 29.4* 29.6*   RDW 20.8* 20.6* 21.1* 21.1*    187 157 170     BMP  Recent Labs   Lab 06/03/21  0603 06/02/21  0627 06/01/21  0643 05/31/21  1133     141 141 143   POTASSIUM 3.4 3.4 3.4 3.6   CHLORIDE 108 108 109 111*   RAJ 8.1* 8.2* 8.1* 8.1*   CO2 30 30 28 29   BUN 9 10 11 14   CR 1.00 1.06 1.10 1.22   GLC 83 80 90 94     INRNo lab results found in last 7 days.   BNPNo lab results found in last 7 days.  VENOUS BLOOD GASESNo lab results found in last 7 days.    Additional labs and/or comments:    IMAGING      CT C/A/P 5/31 -  IMPRESSION:  1. Extensive bilateral pulmonary infiltrates.  2. 2 mm nonobstructing stone on the right, no other renal, ureteral,  or bladder stones. No hydronephrosis to suggest obstruction.    CXR 5/29 -  IMPRESSION: Low lung volumes. Mild interstitial prominence. Mild bibasilar atelectasis. No significant pleural fluid. No pneumothorax. Stable mild cardiomegaly. Cardiac rhythm monitoring device implanted in the left chest wall.    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  Chest x-ray on admission showed similar interstitial changes with some improvement in the previously seen infiltrates.  CT chest on admission negative for PE, showed a few patchy opacities in the dependent portions of the lower lobes superimposed on moderate emphysematous changes.  Venous Doppler  ultrasound of the lower extremities showed a right calf DVT for which the patient underwent IVC filter placement.  Hospital course complicated by ongoing hypoxemia.  Repeat CT chest 5/31 showed bilateral pulmonary infiltrates most prominent in the left lower lobe with consolidation.  Fever has resolved with empiric antibiotics and oxygenation has improved.  Suspect residual pulmonary infiltrates and hypoxemia most likely secondary to chronic aspiration with perhaps some residual from recent COVID-19 viral infection.  Respiratory status slowly improving on current bronchodilators and pulmonary toilet. Would continue present rx for now.    PLAN:  1. Adjust oxygen, keep SaO2 > 90%  2. Bronchodilators - Combivent Respimat  3. Antibiotics - Augmentin.  4. Diuresis - Lasix as ordered.  5. Anticoagulation - Lovenox  6. Dysphagia diet, aspiration precautions.  7. OK for discharge anytime from Pulmonary standpoint.    Case discussed with Dr. Tobin.      Giovani Kelly MD    [facility] / [facility]  [phone] (pager)  [phone] (office)

## 2021-06-04 ENCOUNTER — NURSING HOME VISIT (OUTPATIENT)
Dept: GERIATRICS | Facility: CLINIC | Age: 81
End: 2021-06-04
Payer: COMMERCIAL

## 2021-06-04 VITALS
DIASTOLIC BLOOD PRESSURE: 72 MMHG | RESPIRATION RATE: 18 BRPM | WEIGHT: 233.7 LBS | OXYGEN SATURATION: 91 % | HEIGHT: 72 IN | HEART RATE: 86 BPM | SYSTOLIC BLOOD PRESSURE: 123 MMHG | TEMPERATURE: 98.4 F | BODY MASS INDEX: 31.65 KG/M2

## 2021-06-04 DIAGNOSIS — I69.391 DYSPHAGIA DUE TO RECENT CEREBROVASCULAR ACCIDENT (CVA): ICD-10-CM

## 2021-06-04 DIAGNOSIS — I10 BENIGN ESSENTIAL HYPERTENSION: ICD-10-CM

## 2021-06-04 DIAGNOSIS — J96.21 ACUTE ON CHRONIC RESPIRATORY FAILURE WITH HYPOXIA (H): ICD-10-CM

## 2021-06-04 DIAGNOSIS — I48.0 PAROXYSMAL ATRIAL FIBRILLATION (H): ICD-10-CM

## 2021-06-04 DIAGNOSIS — I69.354 HEMIPARESIS AFFECTING LEFT SIDE AS LATE EFFECT OF CEREBROVASCULAR ACCIDENT (CVA) (H): ICD-10-CM

## 2021-06-04 DIAGNOSIS — N40.1 BENIGN PROSTATIC HYPERPLASIA WITH INCOMPLETE BLADDER EMPTYING: ICD-10-CM

## 2021-06-04 DIAGNOSIS — I82.451 ACUTE DEEP VEIN THROMBOSIS (DVT) OF RIGHT PERONEAL VEIN (H): ICD-10-CM

## 2021-06-04 DIAGNOSIS — I50.9 CHRONIC CONGESTIVE HEART FAILURE, UNSPECIFIED HEART FAILURE TYPE (H): ICD-10-CM

## 2021-06-04 DIAGNOSIS — J44.9 CHRONIC OBSTRUCTIVE PULMONARY DISEASE, UNSPECIFIED COPD TYPE (H): ICD-10-CM

## 2021-06-04 DIAGNOSIS — E66.01 MORBID OBESITY, UNSPECIFIED OBESITY TYPE (H): ICD-10-CM

## 2021-06-04 DIAGNOSIS — R53.81 PHYSICAL DECONDITIONING: ICD-10-CM

## 2021-06-04 DIAGNOSIS — R39.14 BENIGN PROSTATIC HYPERPLASIA WITH INCOMPLETE BLADDER EMPTYING: ICD-10-CM

## 2021-06-04 DIAGNOSIS — D64.9 ACUTE ON CHRONIC ANEMIA: ICD-10-CM

## 2021-06-04 DIAGNOSIS — J18.9 HCAP (HEALTHCARE-ASSOCIATED PNEUMONIA): Primary | ICD-10-CM

## 2021-06-04 PROCEDURE — 99310 SBSQ NF CARE HIGH MDM 45: CPT | Performed by: NURSE PRACTITIONER

## 2021-06-04 ASSESSMENT — MIFFLIN-ST. JEOR: SCORE: 1818.06

## 2021-06-04 NOTE — PROGRESS NOTES
[facility]    PRIMARY CARE PROVIDER AND CLINIC:  Augustin Thomas MD, [address] / [place] [address]  Chief Complaint   Patient presents with     Hospital F/U     [place] [facility] Record Number:  [id number]  Place of Service where encounter took place:  [facility] [[id number]]    Ron Gilmore  is a 80 year old  (1940), returned to the above facility from  [facility]. Hospital stay 5/18/21 - 6/3/21. .  Admitted to this facility for  rehab, medical management and nursing care.    HPI:    HPI information obtained from: facility chart records, facility staff, patient report and [facility] chart review.     Brief Summary of Hospital Course:     PMH: hx CVA with residual left-sided weakness, COPD, depression, dysphagia, dyslipidemia, depression, diabetes mellitus type 2 diet-controlled    Patient admitted to [facility] 5/1-5/4/21 due to SOB and vomiting. Tested + COVID on 5/1. Was placed on 4L O2. CXR + shallow inspiration, no clear infiltrates. Received 1st COVID vaccine previously, missed 2nd dose due to hospitalization in 03/2021. Was treated with dexamethasone and remdesevir. Per hospital notes, had low d-dimer 0.6 and short term anticoagulation prophylaxis was not indicated. Also noted to have UTI associated with chronic indwelling catheter, UC + E faecalis and was started on IV ceftriaxone then transitioned to course of ciprofloxacin upon discharge. Transferred to [facility] on 5/4.      Readmitted to [facility] 5/10-5/17/21 due to acute respiratory failure due to dysphagia. CT chest negative for PE. CXR + bilateral infiltrates, was started on zosyn and macrobid for pneumonia and also noted to have catheter associated UTI. Was discharged back to [facility].    Patient admitted to [facility] 5/18-6/3/21 due to acute on chronic respiratory failure, sepsis associated with HCAP. CT chest negative for PE, but segmental or smaller not ruled out due to  motion artifact. Lung showed emphysema and patchy lower lobe infiltrates but small, no pulmonary edema. BLE venous US 5/19/21 + R peroneal vein DVT. Was started on lovenox, then held on 5/23 due to probable GI bleed. S/p IVC filter on 5/24. Completed 10-days of zosyn on 5/28 for HCAP. O2 requirements increased on 5/29 up to 6L, CT chest with extensive bilateral infiltrates. Pulmonology consulted, concern for aspiration associated with worsening infiltrates, was started on course of augmentin. Recommending to follow-up with Vascular for anticoagulation recommendations/IVC filter management. Acute anemia w/melanic stool 5/23, GI consulted, patient initially declined EGD then was completed on 5/28 w/normal findings. Colonoscopy 5/29 found normal mucosa of the colon, diverticulosis. GI recommending outpatient capsule endoscopy. Received 1unit PRBC 5/24, 5/28; also received IV venofer x 2 doses. Noted to have intermittent atrial fibrillation, not on anticoagulation; continued ASA on 5/30.     Transferred back to [facility] on 6/3/21.       Updates on Status Since Skilled nursing Admission:     During exam, patient seen resting in bed. Reports doing well, has been participating in therapy. Admits to good appetite. Sleeping well at night. Denies coughing, wheezing, fevers. Denies constipation, diarrhea. Denies chest pain, SOB, headache, syncope. PTA lives w/spouse, goal is to discharge home.        CODE STATUS/ADVANCE DIRECTIVES DISCUSSION:   CPR/Full code   Patient's living condition: lives with spouse  ALLERGIES: Patient has no known allergies.  PAST MEDICAL HISTORY:  has a past medical history of BPH (benign prostatic hyperplasia), Cataract, Cholelithiasis, COPD (chronic obstructive pulmonary disease) (H), Depression, Diabetes mellitus, Dyshidrotic foot dermatitis, Edema, Gout, Hyperlipidemia, Hypertension, Kidney stones, Lumbago, Lumbar disc displacement without myelopathy, Muscle weakness, Neuropathy, diabetic (H),  Obesity, Spinal stenosis, Stroke (H), Unsteady gait, Urinary retention with incomplete bladder emptying, and UTI (urinary tract infection). He also has no past medical history of Asymptomatic human immunodeficiency virus (HIV) infection status (H), Blood in semen, Cerebral palsy (H), Complication of anesthesia, Congenital renal agenesis and dysgenesis, Difficult intubation, Epididymitis, bilateral, Epididymitis, left, Epididymitis, right, Goiter, Hernia, abdominal, History of spinal cord injury, History of thrombophlebitis, Horseshoe kidney, Hydrocephalus (H), Malignant hyperthermia, Mumps, Orchitis, epididymitis, and epididymo-orchitis, with abscess, Palpitations, Parkinsons disease (H), Penile discharge, PONV (postoperative nausea and vomiting), Prostate infection, Spider veins, Spina bifida (H), Spinal headache, STD (sexually transmitted disease), Swelling of testicle, Tethered cord (H), or Tuberculosis.  PAST SURGICAL HISTORY:   has a past surgical history that includes Laminectomy lumbar one level; joint replacement (Right); knee surgery (Bilateral); Appendectomy open; Tonsillectomy; Arthroscopy shoulder rotator cuff repair; cataracts (Bilateral); Cystoscopy (10/19/2011); Cholecystectomy; Eye surgery; Cystoscopy, transurethral resection (TUR) prostate, combined (N/A, 2/21/2018); Electrophysiology Loop Recorder Implant (N/A, 1/20/2020); IR Nephrostomy Tube Placement Right (3/9/2021); IR Ureteral Stent Placement Right (3/16/2021); Laser holmium lithotripsy ureter(s), insert stent, combined (Right, 4/14/2021); IR IVC Filter Placement (5/24/2021); colonoscopy (1986); and Esophagoscopy, gastroscopy, duodenoscopy (EGD), combined (N/A, 5/28/2021).  FAMILY HISTORY: family history includes Prostate Cancer in his father.  SOCIAL HISTORY:   reports that he has quit smoking. He has never used smokeless tobacco. He reports that he does not drink alcohol or use drugs.    Post Discharge Medication Reconciliation Status:  discharge medications reconciled and changed, per note/orders    Current Outpatient Medications   Medication Sig Dispense Refill     acetaminophen (TYLENOL) 325 MG tablet Take 650 mg by mouth every 4 hours as needed for mild pain as needed for pain/fever Max dose 3000mg/24hr       allopurinol (ZYLOPRIM) 300 MG tablet Take 300 mg by mouth daily       amoxicillin-clavulanate (AUGMENTIN) 875-125 MG tablet Take 1 tablet by mouth every 12 hours       aspirin (ASA) 81 MG EC tablet Take 1 tablet (81 mg) by mouth daily       atorvastatin (LIPITOR) 10 MG tablet Take 10 mg by mouth daily       cyanocobalamin (VITAMIN B-12) 500 MCG SUBL sublingual tablet Place 1 tablet (500 mcg) under the tongue daily       Emollient (AMLACTIN ULTRA EX) Apply topically daily as needed TO FEET       furosemide (LASIX) 40 MG tablet Take 1 tablet (40 mg) by mouth daily       ipratropium-albuterol (COMBIVENT RESPIMAT)  MCG/ACT inhaler Inhale 1 puff into the lungs 4 times daily 0800, 1200 ,1600 ,2000       metoprolol tartrate (LOPRESSOR) 25 MG tablet Take 0.5 tablets (12.5 mg) by mouth 2 times daily       pantoprazole (PROTONIX) 40 MG EC tablet Take 1 tablet (40 mg) by mouth 2 times daily (before meals)       PARoxetine (PAXIL) 20 MG tablet Take 20 mg by mouth daily       polyethylene glycol (MIRALAX) 17 GM/Dose powder Take 17 g by mouth daily 510 g          ROS:  10 point ROS of systems including Constitutional, Eyes, Respiratory, Cardiovascular, Gastroenterology, Genitourinary, Integumentary, Musculoskeletal, Psychiatric were all negative except for pertinent positives noted in my HPI.      Vitals:  /72   Pulse 86   Temp 98.4  F (36.9  C)   Resp 18   Ht 1.829 m (6')   Wt 106 kg (233 lb 11.2 oz)   SpO2 91%   BMI 31.70 kg/m    Exam:  Limited observational exam due to COVID19 precautions  GENERAL APPEARANCE:  Alert, oriented, cooperative. Hoarse, soft voice.   ENT:  Mouth and posterior oropharynx normal, moist mucous membranes,  [place]  EYES:  EOM, conjunctivae, lids, pupils and irises normal, PERRL  RESP:  Poor respiratory effort and palpation of chest normal, diminished breath sounds throughout  CV:  Palpation and auscultation of heart done , regular rate and rhythm, no murmur, rub, or gallop, no edema  M/S:   Gait and station abnormal, wheelchair bound. Uses aleks lift for transfers. Digits and nails normal  SKIN:  Inspection of skin and subcutaneous tissue baseline  NEURO:   Cranial nerves 2-12 are normal tested and grossly at patient's baseline  PSYCH:  Oriented X 3, affect and mood normal    Wt Readings from Last 4 Encounters:   06/04/21 106 kg (233 lb 11.2 oz)   06/03/21 97.3 kg (214 lb 8.1 oz)   05/18/21 106 kg (233 lb 11.2 oz)   05/17/21 112 kg (246 lb 14.4 oz)       Lab/Diagnostic data:  Recent labs in [facility] reviewed by me today.  and   Most Recent 3 CBC's:  Recent Labs   Lab Test 06/03/21  0603 06/02/21  0627 06/01/21  0643   WBC 6.3 6.9 7.6   HGB 8.2* 8.5* 8.3*   MCV 96 96 96    187 157     Most Recent 3 BMP's:  Recent Labs   Lab Test 06/03/21  0603 06/02/21  0627 06/01/21  0643    141 141   POTASSIUM 3.4 3.4 3.4   CHLORIDE 108 108 109   CO2 30 30 28   BUN 9 10 11   CR 1.00 1.06 1.10   ANIONGAP 4 3 4   RAJ 8.1* 8.2* 8.1*   GLC 83 80 90     Most Recent 2 LFT's:  Recent Labs   Lab Test 05/30/21  0628 05/10/21  2110   AST 19 35   ALT 12 37   ALKPHOS 67 88   BILITOTAL 1.7* 0.5     Most Recent 3 Hemoglobins:  Recent Labs   Lab Test 06/03/21  0603 06/02/21  0627 06/01/21  0643   HGB 8.2* 8.5* 8.3*     Most Recent TSH and T4:  Recent Labs   Lab Test 05/18/21  1938   TSH 1.95     Most Recent Hemoglobin A1c:  Recent Labs   Lab Test 05/11/21  0545   A1C 6.0*     XR Chest Port 1 View     Narrative     CHEST ONE VIEW  5/18/2021 7:53 PM      HISTORY: Respiratory distress.     COMPARISON: May 15, 2021        Impression     IMPRESSION: Similar interstitial change, previously seen infiltrates  appear improved.     BRISSA MENESES MD   CT  Chest Pulmonary Embolism w Contrast     Narrative     EXAM: CT CHEST WITH CONTRAST - PULMONARY EMBOLISM PROTOCOL   LOCATION: [facility]  DATE/TIME: 5/18/2021 10:01 PM     INDICATION: Respiratory distress. Positive D-dimer.  COMPARISON: 05/11/2021 - CT chest, abdomen and pelvis.     TECHNIQUE: CT angiogram chest during pulmonary arterial phase injection IV contrast. Dose reduction techniques were used.   CONTRAST: 79mL [id number]     FINDINGS:     ANGIOGRAM CHEST: Note the detection of segmental and subsegmental pulmonary emboli is limited due to motion artifact. No visualized embolus within the main or lobar pulmonary arteries. The thoracic aorta is normal in caliber. Atherosclerotic   calcification in the thoracic aorta.     LUNGS AND PLEURA: Moderate emphysematous changes in the lungs. Several irregular curvilinear opacities in the posterior aspects of the upper and mid lungs bilaterally are again noted and most likely represent scarring. A few patchy opacities are present   in the dependent aspects of bilateral lower lobes.     MEDIASTINUM/AXILLAE: A few nonspecific borderline enlarged mediastinal lymph nodes, not convincingly changed. For example, there is a 2.0 x 0.9 cm lower right paratracheal lymph node (series 4 image 41).     CORONARY ARTERY CALCIFICATION: Present.     MUSCULOSKELETAL: No acute findings.     UPPER ABDOMEN: Prior cholecystectomy.        Impression     IMPRESSION:   1. A few patchy opacities in the dependent aspects of bilateral lower lobes could represent atelectasis or pneumonia.  2. No visualized embolus in the main or lobar pulmonary arteries. Detection of segmental and subsegmental pulmonary emboli is limited on this study due to motion artifact.  3. Moderate emphysematous changes in the lungs.   US Lower Extremity Venous Duplex Bilateral     Narrative     ULTRASOUND VENOUS LOWER EXTREMITY BILATERAL   5/19/2021 3:49 PM      HISTORY: Positive d-dimer. COVID   +     COMPARISON: None.     TECHNIQUE: Ultrasound gray scale, Color Doppler flow, and spectral  Doppler waveform analysis performed.     FINDINGS: The right common femoral, deep profunda femoral, right  femoral and right popliteal veins are compressible with no evidence of  DVT. The right peroneal vein is noncompressible thrombus is  identified. Findings consistent with calf DVT.     The left common femoral, superficial femoral, popliteal and  segmentally visualized calf veins are patent and fully compressible  and demonstrate normal venous Doppler flow      The visualized greater saphenous veins are negative for thrombus.         Impression     IMPRESSION:   Thrombus identified right peroneal vein, findings consistent with  right calf DVT.        The findings were called to the patient's nurse Juli 5/19/2031 at  4:15 PM.     ABRAM PIZANO MD   IR IVC Filter Placement     Narrative     LOCATION: [facility]  DATE: 5/24/2021     PROCEDURES:  1.  INFERIOR VENACAVOGRAM  2.  RETRIEVABLE INFERIOR VENA CAVA FILTER PLACEMENT  3.  MODERATE SEDATION     INDICATION: Deep vein thrombosis with contraindication to  anticoagulation.     SEDATION: During the time-out, immediately prior to administration of  medications, the patient was reassessed for adequacy to receive  conscious sedation. Versed 1 mg and fentanyl 50 mcg were administered  intravenously with continuous vital signs monitoring by the radiology  nurse under my direct supervision. Total face to face moderate  sedation time: 9 minutes.      ADDITIONAL MEDICATIONS: None.  FLUOROSCOPIC TIME: 1.3 mins.  DOSE: Air Kerma: 32 mGy.  CONTRAST: 20 cc.     PROCEDURE:   After obtaining informed written and oral consent the patient was  prepped and draped in a sterile fashion. Prior to the procedure  patency of the right femoral  vein was confirmed with ultrasound and  images recorded.   The right groin  was prepped and draped in the usual fashion and  anesthetized  with 1% lidocaine.  Using real-time ultrasound guidance,  the right femoral  vein was then punctured and a sheath placed. A 9  Tamazight sheath was advanced into the inferior vena cava..  An IVC gram  was  performed . The [place] retrievable IVC filter was  deployed in  the infrarenal IVC.  A follow up IVC gram was performed through  to  document  position of the filter.  The patient tolerated the procedure  well without immediate complication.      FINDINGS:   The IVC-gram shows the IVC to be normal in caliber and free of filling  defects.  The renal veins are visualized bilaterally.  Following  placement of the IVC filter, the follow-up IVC gram performed shows  the filter to be in good position in the infrarenal IVC.        Impression     IMPRESSION:   1.  NORMAL IVC WITH NO FILLING DEFECTS.    2.  PLACEMENT OF  LUNA RETRIEVABLE INFRARENAL IVC FILTER.     SCHEDULE REMOVAL OF THE IVC FILTER BY CONTACTING INTERVENTIONAL  RADIOLOGY.        ABRAM PIZANO MD   XR Chest Port 1 View     Narrative     EXAM: XR CHEST PORT 1 VIEW  LOCATION: [facility]  DATE/TIME: 5/29/2021 11:23 PM     INDICATION: Difficulty breathing.  COMPARISON: CTA chest 05/18/2021. Chest radiograph 05/18/2021.        Impression     IMPRESSION: Low lung volumes. Mild interstitial prominence. Mild bibasilar atelectasis. No significant pleural fluid. No pneumothorax. Stable mild cardiomegaly. Cardiac rhythm monitoring device implanted in the left chest wall.   CT Chest Abdomen Pelvis w/o Contrast     Narrative     CT CHEST/ABDOMEN/PELVIS WITHOUT CONTRAST 5/31/2021 1:11 PM     CLINICAL HISTORY: Respiratory distress, recurrent acute injury,  history of obstructing urinary stones.     TECHNIQUE: CT scan of the chest, abdomen, and pelvis was performed  without IV contrast. Multiplanar reformats were obtained. Dose  reduction techniques were used.   CONTRAST: None     COMPARISON: CT of the chest from 5/18/2021, chest/abdomen/pelvis CT  from  5/11/2021.     FINDINGS:   LUNGS AND PLEURA: Extensive left lower lobe infiltrate/consolidation.  Patchy moderate right lower lobe infiltrate. Mild-moderate bilateral  upper lobe infiltrates dependently. Moderate emphysema. No effusions.     MEDIASTINUM/AXILLAE: No lymphadenopathy. No thoracic aortic aneurysms.  There are extensive coronary vascular calcifications and/or stents  consistent with coronary artery disease.     HEPATOBILIARY: Cholecystectomy. Unremarkable liver.     PANCREAS: No significant mass, duct dilatation, or inflammatory  change.     SPLEEN: Normal size.     ADRENAL GLANDS: No significant nodules.     KIDNEYS/BLADDER: 2 mm nonobstructing stone in the mid right kidney. No  other renal, ureteral, or bladder stones. No hydronephrosis.     BOWEL: No obstruction or inflammatory change.     PELVIC ORGANS: No pelvic masses.     ADDITIONAL FINDINGS: No ascites.     MUSCULOSKELETAL: Unremarkable.        Impression     IMPRESSION:     1. Extensive bilateral pulmonary infiltrates.  2. 2 mm nonobstructing stone on the right, no other renal, ureteral,  or bladder stones. No hydronephrosis to suggest obstruction.     BRISSA MENESES MD         ASSESSMENT/PLAN:    (J18.9) HCAP (healthcare-associated pneumonia)  (primary encounter diagnosis)  (J96.21) Acute on chronic respiratory failure with hypoxia (H)  (J44.9) Chronic obstructive pulmonary disease, unspecified COPD type (H)  Comment: Acute on chronic respiratory failure due to HCAP vs aspiration pneumonia secondary to dysphagia. COPD appears stable. Also hx sleep apnea, noncompliant with CPAP. Hx COVID 5/1/21.   Plan:   - Check CBC & BMP 6/7  - Course of augmentin to be completed on 6/8  - Continue combivent inhaler QID  - Requires 2L O2 continuously  - Monitor respiratory status and O2 sats, keep O2 > 90%  - Patient verbalizes understanding and agrees with treatment plan.     (I69.449) Dysphagia due to recent cerebrovascular accident (CVA)  Comment: Chronic  dysphagia r/t prior CVA  Plan:   - ST following, on DD2 w/nectar thick liquids  - Monitor intake and respiratory status due to high risk for aspiration  - Patient verbalizes understanding and agrees with treatment plan.     (I82.451) Acute deep vein thrombosis (DVT) of right peroneal vein (H)  (I48.0) Paroxysmal atrial fibrillation (H)  Comment: Acute R peroneal vein DVT 5/19/21, s/p IVC filter placement on 5/24. Hx PAF.   Plan:   - Continue ASA (restarted on 5/30), metoprolol  - Patient to follow-up with Vascular in 2-4 weeks for evaluation DVT, anticoagulation, and IVC filter management  - Patient to follow-up with Dr. Parisi (Cardiologist)   - Patient verbalizes understanding and agrees with treatment plan.     (D64.9) Acute on chronic anemia  Comment: Acute on chronic anemia due to probably upper GI bleed. Colonoscopy 5/29 found normal mucosa of colon, diverticulosis. S/p 1 unit PRBC 5/24 & 5/28, also received IV venofer x 2 doses.   Plan:   - Continue protonix  - Patient to follow-up with Dr. Davis (GI) in 2-4 weeks for capsule endoscopy consideration    (E66.01) Morbid obesity, unspecified obesity type (H)  (I69.354) Hemiparesis affecting left side as late effect of cerebrovascular accident (CVA) (H)  (R53.81) Physical deconditioning  Comment: Chronic left-sided hemiparesis r/t prior CVA. Chronic morbid obesity w/chronic physical deconditioning. PTA lives w/spouse. Nonambulatory, wheelchair bound. Uses aleks lift for transfers. Requires assistance with ADLs.  Plan:   - Encourage active participation in therapy session to increase strength and promote independence in activities and ADLs.   - Cognitive testing to be done in therapy.   - SW following for discharge planning. Goal is to discharge home.  - Patient verbalizes understanding and agrees with treatment plan.     (N40.1,  R39.14) Benign prostatic hyperplasia with incomplete bladder emptying  Comment: Chronic chirinos catheter due to BPH  Plan:   - Continue  catheter care and monitor urine ouptut  - Patient to follow-up with Dr. Sullivan as directed    (I50.9) Chronic congestive heart failure, unspecified heart failure type (H)  (I10) Benign essential hypertension  Comment: Controlled CHF, HTN.   Plan:   - Continue ASA, metoprolol, lasix, lipitor  - Monitor weights, BP/HR  - Patient to follow-up with Cardiologist as directed  - Patient verbalizes understanding and agrees with treatment plan.             Total time spent with patient visit at the [facility] was 37 mins including patient visit and review of past records. Greater than 50% of total time (23 minutes) spent with counseling patient regarding treatment plan, medication management, follow-up labs, and follow-up appointments. Patient verbalizes understanding and agrees with treatment plan. Coordinating care with  regarding discharge planning.     Electronically signed by:  ELIZABETH Newsome CNP

## 2021-06-04 NOTE — TELEPHONE ENCOUNTER
LM to sched NEW CONSULT with RYLAN.     Apt Note:   Referred to [facility] by Rg Tobin DO for DVT, GI bleeding, status post IVC filter, anticoagulation management      IP @ [facility] 5/18/21 to 6/3/21  Per Dr. Tobin's note: follow-up with vascular medicine as outpatient for further anticoagulation/IVC filter management.     Discharged on ASA 81 mg daily.  Lovenox was discontinued 5/23/21 d/t GI bleed.    Zully DIOP

## 2021-06-07 ENCOUNTER — DOCUMENTATION ONLY (OUTPATIENT)
Dept: OTHER | Facility: CLINIC | Age: 81
End: 2021-06-07

## 2021-06-07 ENCOUNTER — HOSPITAL LABORATORY (OUTPATIENT)
Dept: OTHER | Facility: CLINIC | Age: 81
End: 2021-06-07

## 2021-06-07 LAB
ANION GAP SERPL CALCULATED.3IONS-SCNC: 6 MMOL/L (ref 3–14)
BUN SERPL-MCNC: 14 MG/DL (ref 7–30)
CALCIUM SERPL-MCNC: 8.7 MG/DL (ref 8.5–10.1)
CHLORIDE SERPL-SCNC: 99 MMOL/L (ref 94–109)
CO2 SERPL-SCNC: 32 MMOL/L (ref 20–32)
CREAT SERPL-MCNC: 1.04 MG/DL (ref 0.66–1.25)
ERYTHROCYTE [DISTWIDTH] IN BLOOD BY AUTOMATED COUNT: 20.3 % (ref 10–15)
GFR SERPL CREATININE-BSD FRML MDRD: 68 ML/MIN/{1.73_M2}
GLUCOSE SERPL-MCNC: 95 MG/DL (ref 70–99)
HCT VFR BLD AUTO: 34.2 % (ref 40–53)
HGB BLD-MCNC: 10.1 G/DL (ref 13.3–17.7)
MCH RBC QN AUTO: 29.1 PG (ref 26.5–33)
MCHC RBC AUTO-ENTMCNC: 29.5 G/DL (ref 31.5–36.5)
MCV RBC AUTO: 99 FL (ref 78–100)
PLATELET # BLD AUTO: 198 10E9/L (ref 150–450)
POTASSIUM SERPL-SCNC: 3.7 MMOL/L (ref 3.4–5.3)
RBC # BLD AUTO: 3.47 10E12/L (ref 4.4–5.9)
SODIUM SERPL-SCNC: 137 MMOL/L (ref 133–144)
WBC # BLD AUTO: 7.8 10E9/L (ref 4–11)

## 2021-06-08 ENCOUNTER — NURSING HOME VISIT (OUTPATIENT)
Dept: GERIATRICS | Facility: CLINIC | Age: 81
End: 2021-06-08
Payer: COMMERCIAL

## 2021-06-08 VITALS
TEMPERATURE: 98 F | HEIGHT: 72 IN | WEIGHT: 234.6 LBS | HEART RATE: 97 BPM | DIASTOLIC BLOOD PRESSURE: 73 MMHG | SYSTOLIC BLOOD PRESSURE: 127 MMHG | OXYGEN SATURATION: 94 % | BODY MASS INDEX: 31.77 KG/M2 | RESPIRATION RATE: 18 BRPM

## 2021-06-08 DIAGNOSIS — I69.391 DYSPHAGIA DUE TO RECENT CEREBROVASCULAR ACCIDENT (CVA): ICD-10-CM

## 2021-06-08 DIAGNOSIS — I69.354 HEMIPARESIS AFFECTING LEFT SIDE AS LATE EFFECT OF CEREBROVASCULAR ACCIDENT (CVA) (H): ICD-10-CM

## 2021-06-08 DIAGNOSIS — D64.9 ACUTE ON CHRONIC ANEMIA: ICD-10-CM

## 2021-06-08 DIAGNOSIS — R53.81 PHYSICAL DECONDITIONING: ICD-10-CM

## 2021-06-08 DIAGNOSIS — J96.11 CHRONIC RESPIRATORY FAILURE WITH HYPOXIA (H): ICD-10-CM

## 2021-06-08 DIAGNOSIS — J44.9 CHRONIC OBSTRUCTIVE PULMONARY DISEASE, UNSPECIFIED COPD TYPE (H): Primary | ICD-10-CM

## 2021-06-08 PROCEDURE — 99309 SBSQ NF CARE MODERATE MDM 30: CPT | Performed by: NURSE PRACTITIONER

## 2021-06-08 ASSESSMENT — MIFFLIN-ST. JEOR: SCORE: 1822.14

## 2021-06-08 NOTE — PROGRESS NOTES
[place] [facility]    [place] Medical Record Number:  [id number]  Place of Service where encounter took place:  [facility] (TCU) [[id number]]  Chief Complaint   Patient presents with     RECHECK       HPI:    Ron Gilmore  is a 80 year old (1940), who is being seen today for an episodic care visit.  HPI information obtained from: facility chart records, facility staff, patient report and [facility] chart review.     PMH: hx CVA with residual left-sided weakness, COPD, depression, dysphagia, dyslipidemia, depression, diabetes mellitus type 2 diet-controlled     Patient admitted to [facility] 5/1-5/4/21 due to SOB and vomiting. Tested + COVID on 5/1. Was placed on 4L O2. CXR + shallow inspiration, no clear infiltrates. Received 1st COVID vaccine previously, missed 2nd dose due to hospitalization in 03/2021. Was treated with dexamethasone and remdesevir. Per hospital notes, had low d-dimer 0.6 and short term anticoagulation prophylaxis was not indicated. Also noted to have UTI associated with chronic indwelling catheter, UC + E faecalis and was started on IV ceftriaxone then transitioned to course of ciprofloxacin upon discharge. Transferred to [facility] on 5/4.      Readmitted to [facility] 5/10-5/17/21 due to acute respiratory failure due to dysphagia. CT chest negative for PE. CXR + bilateral infiltrates, was started on zosyn and macrobid for pneumonia and also noted to have catheter associated UTI. Was discharged back to [facility].     Patient admitted to [facility] 5/18-6/3/21 due to acute on chronic respiratory failure, sepsis associated with HCAP. CT chest negative for PE, but segmental or smaller not ruled out due to motion artifact. Lung showed emphysema and patchy lower lobe infiltrates but small, no pulmonary edema. BLE venous US 5/19/21 + R peroneal vein DVT. Was started on lovenox, then held on 5/23 due to probable GI bleed. S/p IVC filter on 5/24. Completed 10-days of zosyn on 5/28  for HCAP. O2 requirements increased on 5/29 up to 6L, CT chest with extensive bilateral infiltrates. Pulmonology consulted, concern for aspiration associated with worsening infiltrates, was started on course of augmentin. Recommending to follow-up with Vascular for anticoagulation recommendations/IVC filter management. Acute anemia w/melanic stool 5/23, GI consulted, patient initially declined EGD then was completed on 5/28 w/normal findings. Colonoscopy 5/29 found normal mucosa of the colon, diverticulosis. GI recommending outpatient capsule endoscopy. Received 1unit PRBC 5/24, 5/28; also received IV venofer x 2 doses. Noted to have intermittent atrial fibrillation, not on anticoagulation; continued ASA on 5/30.      Transferred back to [facility] on 6/3/21.       Today's concern is:    During exam, patient seen resting in bed. Reports doing well, states does not want to participate in therapy given his baseline is primarily wheelchair bound. Has not been out of bed w/meals, states at home eats most meals in bed. Has been working with ST, continues to be on dysphagia diet. Noted to be coughing on liquids, states that happens often. Admits to improvement in appetite. Sleeping well at night. Denies constipation, diarrhea. Denies chest pain, SOB, headache, syncope.        Past Medical and Surgical History reviewed in Epic today.    MEDICATIONS:    Current Outpatient Medications   Medication Sig Dispense Refill     acetaminophen (TYLENOL) 325 MG tablet Take 650 mg by mouth every 4 hours as needed for mild pain as needed for pain/fever Max dose 3000mg/24hr       allopurinol (ZYLOPRIM) 300 MG tablet Take 300 mg by mouth daily       aspirin (ASA) 81 MG EC tablet Take 1 tablet (81 mg) by mouth daily       atorvastatin (LIPITOR) 10 MG tablet Take 10 mg by mouth daily       cyanocobalamin (VITAMIN B-12) 500 MCG SUBL sublingual tablet Place 1 tablet (500 mcg) under the tongue daily       Emollient (AMLACTIN ULTRA EX) Apply  topically daily as needed TO FEET       furosemide (LASIX) 40 MG tablet Take 1 tablet (40 mg) by mouth daily       ipratropium-albuterol (COMBIVENT RESPIMAT)  MCG/ACT inhaler Inhale 1 puff into the lungs 4 times daily 0800, 1200 ,1600 ,2000       metoprolol tartrate (LOPRESSOR) 25 MG tablet Take 0.5 tablets (12.5 mg) by mouth 2 times daily       pantoprazole (PROTONIX) 40 MG EC tablet Take 1 tablet (40 mg) by mouth 2 times daily (before meals)       PARoxetine (PAXIL) 20 MG tablet Take 20 mg by mouth daily       polyethylene glycol (MIRALAX) 17 GM/Dose powder Take 17 g by mouth daily 510 g      amoxicillin-clavulanate (AUGMENTIN) 875-125 MG tablet Take 1 tablet by mouth every 12 hours           REVIEW OF SYSTEMS:  4 point ROS including Respiratory, CV, GI and , other than that noted in the HPI,  is negative      Objective:  /73   Pulse 97   Temp 98  F (36.7  C)   Resp 18   Ht 1.829 m (6')   Wt 106.4 kg (234 lb 9.6 oz)   SpO2 94%   BMI 31.82 kg/m    Exam:  GENERAL APPEARANCE:  Alert, oriented, cooperative.  ENT:  Mouth and posterior oropharynx normal, moist mucous membranes, [place]  EYES:  EOM, conjunctivae, lids, pupils and irises normal, PERRL  RESP:  respiratory effort and palpation of chest normal, lungs clear to auscultation , no respiratory distress, diminished breath sounds at bases  CV:  Palpation and auscultation of heart done , regular rate and rhythm, no murmur, rub, or gallop, no edema  M/S:   Gait and station abnormal, wheelchair bound. Uses aleks lift for transfers. L sided hemiparesis. Digits and nails normal  SKIN:  Inspection of skin and subcutaneous tissue baseline  NEURO:   Cranial nerves 2-12 are normal tested and grossly at patient's baseline  PSYCH:  Oriented X 3, affect and mood normal    Wt Readings from Last 4 Encounters:   06/08/21 106.4 kg (234 lb 9.6 oz)   06/04/21 106 kg (233 lb 11.2 oz)   06/03/21 97.3 kg (214 lb 8.1 oz)   05/18/21 106 kg (233 lb 11.2 oz)       Labs:    Labs done in SNF are in [facility]. Please refer to them using EPIC/Care Everywhere., Recent labs in EPIC reviewed by me today.  and   Most Recent 3 CBC's:  Recent Labs   Lab Test 06/07/21  1150 06/03/21  0603 06/02/21  0627   WBC 7.8 6.3 6.9   HGB 10.1* 8.2* 8.5*   MCV 99 96 96    191 187     Most Recent 3 BMP's:  Recent Labs   Lab Test 06/07/21  1150 06/03/21  0603 06/02/21  0627    142 141   POTASSIUM 3.7 3.4 3.4   CHLORIDE 99 108 108   CO2 32 30 30   BUN 14 9 10   CR 1.04 1.00 1.06   ANIONGAP 6 4 3   RAJ 8.7 8.1* 8.2*   GLC 95 83 80         ASSESSMENT/PLAN:    (J44.9) Chronic obstructive pulmonary disease, unspecified COPD type (H)  (primary encounter diagnosis)  (J96.11) Chronic respiratory failure with hypoxia (H)  Comment: HCAP resolved, completed course of augmentin on 6/8. Controlled COPD w/chronic respiratory failure. Also hx sleep apnea, noncompliant with CPAP. Hx COVID 5/1/21. O2 sats stable on 2-4L.   Plan:   - Continue combivent inhaler QID  - Requires 2-4L O2 continuously  - Monitor respiratory status and O2 sats, keep O2 > 90%    (I69.391) Dysphagia due to recent cerebrovascular accident (CVA)  Comment: Chronic dysphagia r/t prior CVA. Currently on DD2 w/nectar thick liquids.   Plan:   - Check BMP 6/14  - ST following. Reviewed concern regarding coughing following liquids during visit today with ST, plan to follow-up with RN staff regarding consistently using nectar thick liquids w/patient and encouraging to be upright when eating or drinking.     (D64.9) Acute on chronic anemia  Comment: Acute on chronic anemia due to probably upper GI bleed. Colonoscopy 5/29 found normal mucosa of colon, diverticulosis. S/p 1 unit PRBC 5/24 & 5/28, also received IV venofer x 2 doses. Hgb improving, last Hgb 10.1 on 6/7, no active sx of bleeding.  Plan:  - Check Hgb 6/14   - Continue protonix  - Patient to follow-up with Dr. Davis (GI) as directed for capsule endoscopy consideration    (I69.700)  Hemiparesis affecting left side as late effect of cerebrovascular accident (CVA) (H)  (R53.81) Physical deconditioning  Comment: Chronic left-sided hemiparesis r/t prior CVA. Chronic morbid obesity w/chronic physical deconditioning. PTA lives w/spouse. Nonambulatory, wheelchair bound. Uses aleks lift for transfers. Requires assistance with ADLs.  Plan:   - Encourage active participation in therapy session to increase strength and promote independence in activities and ADLs. Participation currently has been variable.   - Encourage patient to be up in chair w/meals  - SW following for discharge planning. SW reports family looking into alternative placement options.           Electronically signed by:  ELIZABETH Newsome CNP

## 2021-06-08 NOTE — CONSULTS
Care Transition Initial Assessment - RN        Met with: Patient. Discussed observation status based on order. Observation Brochure was given to the patient. He stated he is ready to go to TCU today if cleared by medical MD.  He is likely still in his medicare window as he had a 3 day stay from 6/13-6/17, so TCU likely covered by his are for [employer].  He stated his first choice of TCU is [facility] as his wife can get there easier, but if unavailable, he would like to go to [facility].  He would like transportation set up if accepted at [facility], and understands that he will be billed for this and accepts. Will update social work.    Gladys Oconnor, RN      DATA   Active Problems:    Tibia/fibula fracture    Closed tibia fracture       Cognitive Status: awake, alert and oriented.        Contact information and PCP information verified: Yes                           Insurance concerns: No Insurance issues identified    ASSESSMENT  Patient currently receives the following services:  Home w/C        Identified issues/concerns regarding health management: ready to follow recommendations for TCU, so none current    PLAN  Financial costs for the patient include copay for hospital adm according to pt .  Patient given options and choices for discharge yes, TCU .  Patient/family is agreeable to the plan?  Yes  Patient anticipates discharging today to [facility] or [facility] if medical doctor agrees and if bed available at TCU .        Patient anticipates needs for home equipment: Does not know  Discharge Planner   Discharge Plans in progress: TCU  Barriers to discharge plan: await MD visit, TCU availability  Follow up plan: await medical clearance       Entered by: Gladys Oconnor 06/24/2017 8:24 AM           Plan/Disposition: TCU   Appointments: TBD    Care  (CTS) will continue to follow as needed.     Minocycline Counseling: Patient advised regarding possible photosensitivity and discoloration of the teeth, skin, lips, tongue and gums.  Patient instructed to avoid sunlight, if possible.  When exposed to sunlight, patients should wear protective clothing, sunglasses, and sunscreen.  The patient was instructed to call the office immediately if the following severe adverse effects occur:  hearing changes, easy bruising/bleeding, severe headache, or vision changes.  The patient verbalized understanding of the proper use and possible adverse effects of minocycline.  All of the patient's questions and concerns were addressed.

## 2021-06-08 NOTE — TELEPHONE ENCOUNTER
Called and talked to patient's wife. (Yuli) Patient is still at [facility] and will probably not be coming home. Getting transportation set up (wife has to line it up) is a challenge. Yuli would like to wait a week or two and revisit scheduling this Consult appointment.

## 2021-06-14 ENCOUNTER — HOSPITAL LABORATORY (OUTPATIENT)
Dept: OTHER | Facility: CLINIC | Age: 81
End: 2021-06-14

## 2021-06-14 LAB
ANION GAP SERPL CALCULATED.3IONS-SCNC: 2 MMOL/L (ref 3–14)
BUN SERPL-MCNC: 16 MG/DL (ref 7–30)
CALCIUM SERPL-MCNC: 9.1 MG/DL (ref 8.5–10.1)
CHLORIDE SERPL-SCNC: 97 MMOL/L (ref 94–109)
CO2 SERPL-SCNC: 39 MMOL/L (ref 20–32)
CREAT SERPL-MCNC: 1.03 MG/DL (ref 0.66–1.25)
GFR SERPL CREATININE-BSD FRML MDRD: 69 ML/MIN/{1.73_M2}
GLUCOSE SERPL-MCNC: 61 MG/DL (ref 70–99)
HGB BLD-MCNC: 10 G/DL (ref 13.3–17.7)
POTASSIUM SERPL-SCNC: 3.9 MMOL/L (ref 3.4–5.3)
SODIUM SERPL-SCNC: 138 MMOL/L (ref 133–144)

## 2021-06-21 NOTE — TELEPHONE ENCOUNTER
2nd ATTEMPT  LM to sched NEW CONSULT with RYLAN.      Apt Note:   Referred to [facility] by Rg Tobin DO for DVT, GI bleeding, status post IVC filter, anticoagulation management      IP @ [facility] 5/18/21 to 6/3/21  Per Dr. Tobin's note: follow-up with vascular medicine as outpatient for further anticoagulation/IVC filter management.     Discharged on ASA 81 mg daily.  Lovenox was discontinued 5/23/21 d/t GI bleed.     Zully DIOP

## 2021-06-23 ENCOUNTER — NURSING HOME VISIT (OUTPATIENT)
Dept: GERIATRICS | Facility: CLINIC | Age: 81
End: 2021-06-23
Payer: COMMERCIAL

## 2021-06-23 VITALS
HEIGHT: 72 IN | WEIGHT: 235 LBS | RESPIRATION RATE: 18 BRPM | HEART RATE: 82 BPM | BODY MASS INDEX: 31.83 KG/M2 | DIASTOLIC BLOOD PRESSURE: 76 MMHG | TEMPERATURE: 98.7 F | SYSTOLIC BLOOD PRESSURE: 124 MMHG | OXYGEN SATURATION: 93 %

## 2021-06-23 DIAGNOSIS — R53.81 PHYSICAL DECONDITIONING: ICD-10-CM

## 2021-06-23 DIAGNOSIS — I48.0 PAROXYSMAL ATRIAL FIBRILLATION (H): ICD-10-CM

## 2021-06-23 DIAGNOSIS — D50.9 IRON DEFICIENCY ANEMIA, UNSPECIFIED IRON DEFICIENCY ANEMIA TYPE: ICD-10-CM

## 2021-06-23 DIAGNOSIS — I50.9 CHRONIC CONGESTIVE HEART FAILURE, UNSPECIFIED HEART FAILURE TYPE (H): Primary | ICD-10-CM

## 2021-06-23 DIAGNOSIS — I69.354 HEMIPARESIS AFFECTING LEFT SIDE AS LATE EFFECT OF CEREBROVASCULAR ACCIDENT (CVA) (H): ICD-10-CM

## 2021-06-23 PROCEDURE — 99310 SBSQ NF CARE HIGH MDM 45: CPT | Performed by: NURSE PRACTITIONER

## 2021-06-23 ASSESSMENT — MIFFLIN-ST. JEOR: SCORE: 1823.95

## 2021-06-23 NOTE — PROGRESS NOTES
[place] [facility]    [place] Medical Record Number:  [id number]  Place of Service where encounter took place:  [facility]) [[id number]]  Chief Complaint   Patient presents with     RECHECK       HPI:    Ron Gilmore  is a 80 year old (1940), who is being seen today for an episodic care visit.  HPI information obtained from: facility chart records, facility staff, patient report and [facility] chart review.     PMH: hx CVA with residual left-sided weakness, COPD, depression, dysphagia, dyslipidemia, depression, diabetes mellitus type 2 diet-controlled. Hx COVID (5/1/21).       Today's concern is:    During exam, patient seen resting in bed. Reports doing well. States staff have offered to get out of bed in wheelchair, but reports does not have a wheelchair in his room. Does have a power wheelchair at home, unsure how he can get it sent to facility. States at home would primarily spend most of the day in bed. Admits to good appetite. Sleeping well at night. Reports wants to go home, SW following for discharge planning. Denies chest pain, SOB, headache, syncope.        Past Medical and Surgical History reviewed in Epic today.    MEDICATIONS:    Current Outpatient Medications   Medication Sig Dispense Refill     acetaminophen (TYLENOL) 325 MG tablet Take 650 mg by mouth every 4 hours as needed for mild pain as needed for pain/fever Max dose 3000mg/24hr       allopurinol (ZYLOPRIM) 300 MG tablet Take 300 mg by mouth daily       aspirin (ASA) 81 MG EC tablet Take 1 tablet (81 mg) by mouth daily       atorvastatin (LIPITOR) 10 MG tablet Take 10 mg by mouth daily       cyanocobalamin (VITAMIN B-12) 500 MCG SUBL sublingual tablet Place 1 tablet (500 mcg) under the tongue daily       Emollient (AMLACTIN ULTRA EX) Apply topically daily as needed TO FEET       furosemide (LASIX) 40 MG tablet Take 1 tablet (40 mg) by mouth daily       ipratropium-albuterol (COMBIVENT RESPIMAT)   MCG/ACT inhaler Inhale 1 puff into the lungs 4 times daily 0800, 1200 ,1600 ,2000       metoprolol tartrate (LOPRESSOR) 25 MG tablet Take 0.5 tablets (12.5 mg) by mouth 2 times daily       pantoprazole (PROTONIX) 40 MG EC tablet Take 1 tablet (40 mg) by mouth 2 times daily (before meals)       PARoxetine (PAXIL) 20 MG tablet Take 20 mg by mouth daily       polyethylene glycol (MIRALAX) 17 GM/Dose powder Take 17 g by mouth daily 510 g          REVIEW OF SYSTEMS:  4 point ROS including Respiratory, CV, GI and , other than that noted in the HPI,  is negative      Objective:  /76   Pulse 82   Temp 98.7  F (37.1  C)   Resp 18   Ht 1.829 m (6')   Wt 106.6 kg (235 lb)   SpO2 93%   BMI 31.87 kg/m    Exam:  GENERAL APPEARANCE:  Alert, oriented, cooperative.  ENT:  Mouth and posterior oropharynx normal, moist mucous membranes, [place]  EYES:  EOM, conjunctivae, lids, pupils and irises normal, PERRL  RESP:  respiratory effort and palpation of chest normal, lungs clear to auscultation , no respiratory distress, diminished breath sounds at bases  CV:  Palpation and auscultation of heart done , regular rate and rhythm, no murmur, rub, or gallop, no edema  M/S:   Gait and station abnormal, wheelchair bound. Uses aleks lift for transfers. L sided hemiparesis. Digits and nails normal  SKIN:  Inspection of skin and subcutaneous tissue baseline  NEURO:   Cranial nerves 2-12 are normal tested and grossly at patient's baseline  PSYCH:  Oriented X 3, affect and mood normal    Wt Readings from Last 4 Encounters:   06/23/21 106.6 kg (235 lb)   06/08/21 106.4 kg (234 lb 9.6 oz)   06/04/21 106 kg (233 lb 11.2 oz)   06/03/21 97.3 kg (214 lb 8.1 oz)       Labs:   Labs done in SNF are in [facility]. Please refer to them using [place]/Care Everywhere., Recent labs in EPIC reviewed by me today.  and Most Recent 3 CBC's:  Recent Labs   Lab Test 06/14/21  0800 06/07/21  1150 06/03/21  0603 06/02/21  0627   WBC  --  7.8 6.3 6.9    HGB 10.0* 10.1* 8.2* 8.5*   MCV  --  99 96 96   PLT  --  198 191 187     Most Recent 3 BMP's:  Recent Labs   Lab Test 06/14/21  0800 06/07/21  1150 06/03/21  0603    137 142   POTASSIUM 3.9 3.7 3.4   CHLORIDE 97 99 108   CO2 39* 32 30   BUN 16 14 9   CR 1.03 1.04 1.00   ANIONGAP 2* 6 4   RAJ 9.1 8.7 8.1*   GLC 61* 95 83     Most Recent TSH and T4:  Recent Labs   Lab Test 05/18/21  1938   TSH 1.95         ASSESSMENT/PLAN:    (I50.9) Chronic congestive heart failure, unspecified heart failure type (H)  (primary encounter diagnosis)  (I48.0) Paroxysmal atrial fibrillation (H)  Comment: Chronic CHF, paroxysmal atrial fibrillation.   Plan:   - Check BMP 6/30   - Continue ASA, metoprolol, lasix, lipitor  - Patient has remote device check scheduled on 6/28  - Patient to follow-up with Cardiologist as directed  - Patient verbalizes understanding and agrees with treatment plan.     (D50.9) Iron deficiency anemia, unspecified iron deficiency anemia type  Comment: Chronic anemia w/iron deficiency. Recent concern regarding upper GI bleed during hospital stay, s/p 1 unit PRBC 5/24 & 5/28, also received IV venofer x 2 doses. Hgb improving, no active sx of bleeding.  Plan:   - Add ferrous sulfate 325mg every day   - Continue protonix  - Check Hgb 6/30  - Patient to follow-up with Dr. Davis (GI) as directed for capsule endoscopy consideration  - Patient verbalizes understanding and agrees with treatment plan.     (I69.354) Hemiparesis affecting left side as late effect of cerebrovascular accident (CVA) (H)  (R53.81) Physical deconditioning  Comment: Chronic left-sided hemiparesis r/t prior CVA. Ongoing physical deconditioning. PTA lives w/spouse. Nonambulatory, wheelchair bound. Uses aleks lift for transfers. Requires assistance with ADLs.  Plan:   - Encourage patient to be up in chair w/meals  - Discussed with charge RN to see if powerchair from home can be delivered to facility  - Discussed with SW discharge planning,  states needs MA application to open in order to transfer to new facility w/spouse  - Patient verbalizes understanding and agrees with treatment plan.           Total time spent with patient visit at the skilled nursing facility was 38 mins including patient visit and review of past records. Greater than 50% of total time (24 minutes) spent with counseling patient regarding treatment plan, medication management, follow-up labs, and follow-up appointments. Patient verbalizes understanding and agrees with treatment plan. Coordinating care with SW regarding discharge planning.     Electronically signed by:  ELIZABETH Newsome CNP

## 2021-06-29 ENCOUNTER — NURSING HOME VISIT (OUTPATIENT)
Dept: GERIATRICS | Facility: CLINIC | Age: 81
End: 2021-06-29
Payer: COMMERCIAL

## 2021-06-29 VITALS
HEART RATE: 87 BPM | BODY MASS INDEX: 31.83 KG/M2 | WEIGHT: 235 LBS | HEIGHT: 72 IN | OXYGEN SATURATION: 94 % | RESPIRATION RATE: 16 BRPM | TEMPERATURE: 97.3 F | DIASTOLIC BLOOD PRESSURE: 84 MMHG | SYSTOLIC BLOOD PRESSURE: 138 MMHG

## 2021-06-29 DIAGNOSIS — I69.391 DYSPHAGIA DUE TO RECENT CEREBROVASCULAR ACCIDENT (CVA): Primary | ICD-10-CM

## 2021-06-29 DIAGNOSIS — J96.11 CHRONIC RESPIRATORY FAILURE WITH HYPOXIA (H): ICD-10-CM

## 2021-06-29 PROCEDURE — 99309 SBSQ NF CARE MODERATE MDM 30: CPT | Performed by: NURSE PRACTITIONER

## 2021-06-29 ASSESSMENT — MIFFLIN-ST. JEOR: SCORE: 1823.95

## 2021-06-29 NOTE — PROGRESS NOTES
[place] [facility]    [place] Medical Record Number:  [id number]  Place of Service where encounter took place:  [facility]) [[id number]]  Chief Complaint   Patient presents with     RECHECK       HPI:    Ron Gilmore  is a 80 year old (1940), who is being seen today for an episodic care visit.  HPI information obtained from: facility chart records, facility staff, patient report and [facility] chart review.     PMH: hx CVA with residual left-sided weakness, COPD, depression, dysphagia, dyslipidemia, depression, diabetes mellitus type 2 diet-controlled. Hx COVID (5/1/21).       Today's concern is:     RN reported patient had episode of choking on food this morning around 0846, states abdominal thrusts were performed to dislodge food in throat. States O2 briefly dropped to 84%, increased O2 to 3L and O2 sats improved to 95%. Reports after episode, patient's lung sounds are clear and no longer coughing. Reports plan is to obtain wheelchair for patient to be up in chair w/meals and have patient eat meals in dining room for increased supervision.     During exam, patient seen siting in wheelchair. Endorses episode of choking on food in room while laying in bed. Reports since episode occurred, feels better. Has had intermittent coughing since episode, but denies productive cough, nausea or emesis. States willing to go to dining room for increased supervision. Afebrile.      Past Medical and Surgical History reviewed in Epic today.    MEDICATIONS:    Current Outpatient Medications   Medication Sig Dispense Refill     acetaminophen (TYLENOL) 325 MG tablet Take 650 mg by mouth every 4 hours as needed for mild pain as needed for pain/fever Max dose 3000mg/24hr       allopurinol (ZYLOPRIM) 300 MG tablet Take 300 mg by mouth daily       aspirin (ASA) 81 MG EC tablet Take 1 tablet (81 mg) by mouth daily       atorvastatin (LIPITOR) 10 MG tablet Take 10 mg by mouth daily        cyanocobalamin (VITAMIN B-12) 500 MCG SUBL sublingual tablet Place 1 tablet (500 mcg) under the tongue daily       Emollient (AMLACTIN ULTRA EX) Apply topically daily as needed TO FEET       furosemide (LASIX) 40 MG tablet Take 1 tablet (40 mg) by mouth daily       ipratropium-albuterol (COMBIVENT RESPIMAT)  MCG/ACT inhaler Inhale 1 puff into the lungs 4 times daily 0800, 1200 ,1600 ,2000       metoprolol tartrate (LOPRESSOR) 25 MG tablet Take 0.5 tablets (12.5 mg) by mouth 2 times daily       pantoprazole (PROTONIX) 40 MG EC tablet Take 1 tablet (40 mg) by mouth 2 times daily (before meals)       PARoxetine (PAXIL) 20 MG tablet Take 20 mg by mouth daily       polyethylene glycol (MIRALAX) 17 GM/Dose powder Take 17 g by mouth daily 510 g          REVIEW OF SYSTEMS:  4 point ROS including Respiratory, CV, GI and , other than that noted in the HPI,  is negative      Objective:  /84   Pulse 87   Temp 97.3  F (36.3  C)   Resp 16   Ht 1.829 m (6')   Wt 106.6 kg (235 lb)   SpO2 94%   BMI 31.87 kg/m    Exam:  GENERAL APPEARANCE:  Alert, oriented, cooperative.  ENT:  Mouth and posterior oropharynx normal, moist mucous membranes, [place]  EYES:  EOM, conjunctivae, lids, pupils and irises normal, PERRL  RESP:  respiratory effort and palpation of chest normal, lungs clear to auscultation , no respiratory distress, diminished breath sounds at bases  CV:  Palpation and auscultation of heart done , regular rate and rhythm, no murmur, rub, or gallop, no edema  M/S:   Gait and station abnormal, wheelchair bound. Uses aleks lift for transfers. L sided hemiparesis. Digits and nails normal  SKIN:  Inspection of skin and subcutaneous tissue baseline  NEURO:   Cranial nerves 2-12 are normal tested and grossly at patient's baseline  PSYCH:  Oriented X 3, affect and mood normal      Labs:   Labs done in SNF are in [place] [facility]. Please refer to them using [employer]Care Everywhere., Recent labs in EPIC reviewed by me today.   and Most Recent 3 CBC's:  Recent Labs   Lab Test 06/14/21  0800 06/07/21  1150 06/03/21  0603 06/02/21  0627   WBC  --  7.8 6.3 6.9   HGB 10.0* 10.1* 8.2* 8.5*   MCV  --  99 96 96   PLT  --  198 191 187     Most Recent 3 BMP's:  Recent Labs   Lab Test 06/14/21  0800 06/07/21  1150 06/03/21  0603    137 142   POTASSIUM 3.9 3.7 3.4   CHLORIDE 97 99 108   CO2 39* 32 30   BUN 16 14 9   CR 1.03 1.04 1.00   ANIONGAP 2* 6 4   RAJ 9.1 8.7 8.1*   GLC 61* 95 83         ASSESSMENT/PLAN:    (I69.391) Dysphagia due to recent cerebrovascular accident (CVA)  (primary encounter diagnosis)  (J96.11) Chronic respiratory failure with hypoxia (H)  Comment: Acute episode of choking on food, resolves w/coughing. Chronic dysphagia r/t prior CVA, currently on regular diet w/nectar thick liquids. Chronic respiratory failure, O2 sats stable on 2L O2. Hx recurrent aspiration pneumonia.  Plan:   - Check CBC & BMP 6/30   - Assess lung sounds TID, high risk for aspiration given episode of choking  - Continue regular diet w/nectar thick liquids  - Monitor O2 sats qshift and with therapy, keep O2 > 90%.   - Up in wheelchair w/meals in dining room for increased supervision         Electronically signed by:  ELIZABETH Newsome CNP

## 2021-06-30 ENCOUNTER — HOSPITAL LABORATORY (OUTPATIENT)
Dept: OTHER | Facility: CLINIC | Age: 81
End: 2021-06-30

## 2021-06-30 LAB
ANION GAP SERPL CALCULATED.3IONS-SCNC: 3 MMOL/L (ref 3–14)
BUN SERPL-MCNC: 18 MG/DL (ref 7–30)
CALCIUM SERPL-MCNC: 9.1 MG/DL (ref 8.5–10.1)
CHLORIDE SERPL-SCNC: 100 MMOL/L (ref 94–109)
CO2 SERPL-SCNC: 34 MMOL/L (ref 20–32)
CREAT SERPL-MCNC: 0.98 MG/DL (ref 0.66–1.25)
ERYTHROCYTE [DISTWIDTH] IN BLOOD BY AUTOMATED COUNT: 17.8 % (ref 10–15)
GFR SERPL CREATININE-BSD FRML MDRD: 73 ML/MIN/{1.73_M2}
GLUCOSE SERPL-MCNC: 88 MG/DL (ref 70–99)
HCT VFR BLD AUTO: 32.8 % (ref 40–53)
HGB BLD-MCNC: 9.6 G/DL (ref 13.3–17.7)
MCH RBC QN AUTO: 28.8 PG (ref 26.5–33)
MCHC RBC AUTO-ENTMCNC: 29.3 G/DL (ref 31.5–36.5)
MCV RBC AUTO: 99 FL (ref 78–100)
PLATELET # BLD AUTO: 227 10E9/L (ref 150–450)
POTASSIUM SERPL-SCNC: 3.8 MMOL/L (ref 3.4–5.3)
RBC # BLD AUTO: 3.33 10E12/L (ref 4.4–5.9)
SODIUM SERPL-SCNC: 137 MMOL/L (ref 133–144)
WBC # BLD AUTO: 7.7 10E9/L (ref 4–11)

## 2021-07-08 ENCOUNTER — NURSING HOME VISIT (OUTPATIENT)
Dept: GERIATRICS | Facility: CLINIC | Age: 81
End: 2021-07-08
Payer: COMMERCIAL

## 2021-07-08 VITALS
HEART RATE: 91 BPM | TEMPERATURE: 97.9 F | DIASTOLIC BLOOD PRESSURE: 60 MMHG | SYSTOLIC BLOOD PRESSURE: 102 MMHG | RESPIRATION RATE: 18 BRPM | WEIGHT: 234.8 LBS | HEIGHT: 72 IN | BODY MASS INDEX: 31.8 KG/M2 | OXYGEN SATURATION: 92 %

## 2021-07-08 DIAGNOSIS — I82.451 ACUTE DEEP VEIN THROMBOSIS (DVT) OF RIGHT PERONEAL VEIN (H): ICD-10-CM

## 2021-07-08 DIAGNOSIS — D50.9 IRON DEFICIENCY ANEMIA, UNSPECIFIED IRON DEFICIENCY ANEMIA TYPE: ICD-10-CM

## 2021-07-08 DIAGNOSIS — I69.354 HEMIPARESIS AFFECTING LEFT SIDE AS LATE EFFECT OF CEREBROVASCULAR ACCIDENT (CVA) (H): ICD-10-CM

## 2021-07-08 DIAGNOSIS — I69.391 DYSPHAGIA DUE TO RECENT CEREBROVASCULAR ACCIDENT (CVA): ICD-10-CM

## 2021-07-08 DIAGNOSIS — R53.81 PHYSICAL DECONDITIONING: ICD-10-CM

## 2021-07-08 DIAGNOSIS — J96.11 CHRONIC RESPIRATORY FAILURE WITH HYPOXIA (H): Primary | ICD-10-CM

## 2021-07-08 DIAGNOSIS — J44.9 CHRONIC OBSTRUCTIVE PULMONARY DISEASE, UNSPECIFIED COPD TYPE (H): ICD-10-CM

## 2021-07-08 PROCEDURE — 99309 SBSQ NF CARE MODERATE MDM 30: CPT | Performed by: NURSE PRACTITIONER

## 2021-07-08 RX ORDER — FERROUS SULFATE 325(65) MG
325 TABLET ORAL 2 TIMES DAILY
COMMUNITY
End: 2023-04-03

## 2021-07-08 ASSESSMENT — MIFFLIN-ST. JEOR: SCORE: 1823.05

## 2021-07-08 NOTE — PROGRESS NOTES
[facility]    [place] [facility] Record Number:  [id number]  Place of Service where encounter took place:  [facility] [[id number]]  Chief Complaint   Patient presents with     RECHECK       HPI:    Ron Gilmore  is a 80 year old (1940), who is being seen today for an episodic care visit.  HPI information obtained from: facility chart records, facility staff, patient report and [facility] chart review.     PMH: hx CVA with residual left-sided weakness, COPD, depression, dysphagia, dyslipidemia, depression, diabetes mellitus type 2 diet-controlled. Hx COVID (5/1/21).       Today's concern is:    During exam, patient seen resting in bed. Reports doing well. Has been getting up in wheelchair w/meals in dining room. States when up in wheelchair for longer than 1hr, does notice increased back pain. Has been working with therapy team on wheelchair adjustments. Denies recurrent episodes of choking since last week, denies coughing or SOB. Afebrile. Admits to good appetite. Sleeping well at night. Denies constipation, diarrhea. Denies chest pain, SOB, headache, syncope. SW following for discharge planning.       Past Medical and Surgical History reviewed in Epic today.    MEDICATIONS:    Current Outpatient Medications   Medication Sig Dispense Refill     acetaminophen (TYLENOL) 325 MG tablet Take 650 mg by mouth every 4 hours as needed for mild pain as needed for pain/fever Max dose 3000mg/24hr       allopurinol (ZYLOPRIM) 300 MG tablet Take 300 mg by mouth daily       aspirin (ASA) 81 MG EC tablet Take 1 tablet (81 mg) by mouth daily       atorvastatin (LIPITOR) 10 MG tablet Take 10 mg by mouth daily       cyanocobalamin (VITAMIN B-12) 500 MCG SUBL sublingual tablet Place 1 tablet (500 mcg) under the tongue daily       Emollient (AMLACTIN ULTRA EX) Apply topically daily as needed TO FEET       ferrous sulfate (FEROSUL) 325 (65 Fe) MG tablet Take 325 mg by mouth daily        furosemide (LASIX) 40 MG tablet Take 1 tablet (40 mg) by mouth daily       ipratropium-albuterol (COMBIVENT RESPIMAT)  MCG/ACT inhaler Inhale 1 puff into the lungs 4 times daily 0800, 1200 ,1600 ,2000       metoprolol tartrate (LOPRESSOR) 25 MG tablet Take 0.5 tablets (12.5 mg) by mouth 2 times daily       pantoprazole (PROTONIX) 40 MG EC tablet Take 1 tablet (40 mg) by mouth 2 times daily (before meals)       PARoxetine (PAXIL) 20 MG tablet Take 20 mg by mouth daily       polyethylene glycol (MIRALAX) 17 GM/Dose powder Take 17 g by mouth daily 510 g          REVIEW OF SYSTEMS:  4 point ROS including Respiratory, CV, GI and , other than that noted in the HPI,  is negative      Objective:  /60   Pulse 91   Temp 97.9  F (36.6  C)   Resp 18   Ht 1.829 m (6')   Wt 106.5 kg (234 lb 12.8 oz)   SpO2 92%   BMI 31.84 kg/m    Exam:  GENERAL APPEARANCE:  Alert, oriented, cooperative.  ENT:  Mouth and posterior oropharynx normal, moist mucous membranes, [place]  EYES:  EOM, conjunctivae, lids, pupils and irises normal, PERRL  RESP:  respiratory effort and palpation of chest normal, lungs clear to auscultation , no respiratory distress, diminished breath sounds at bases  CV:  Palpation and auscultation of heart done , regular rate and rhythm, no murmur, rub, or gallop, no edema  M/S:   Gait and station abnormal, wheelchair bound. Uses aleks lift for transfers. L sided hemiparesis. Digits and nails normal  SKIN:  Inspection of skin and subcutaneous tissue baseline  NEURO:   Cranial nerves 2-12 are normal tested and grossly at patient's baseline  PSYCH:  Oriented X 3, affect and mood normal    Wt Readings from Last 4 Encounters:   07/08/21 106.5 kg (234 lb 12.8 oz)   06/29/21 106.6 kg (235 lb)   06/23/21 106.6 kg (235 lb)   06/08/21 106.4 kg (234 lb 9.6 oz)     BP Readings from Last 6 Encounters:   07/08/21 102/60   06/29/21 138/84   06/23/21 124/76   06/08/21 127/73   06/04/21 123/72   06/03/21 109/62          Labs:   Labs done in SNF are in [facility]. Please refer to them using EPIC/Care Everywhere., Recent labs in EPIC reviewed by me today.  and Most Recent 3 CBC's:  Recent Labs   Lab Test 06/30/21  0840 06/14/21  0800 06/07/21  1150 06/03/21  0603   WBC 7.7  --  7.8 6.3   HGB 9.6* 10.0* 10.1* 8.2*   MCV 99  --  99 96     --  198 191     Most Recent 3 BMP's:  Recent Labs   Lab Test 06/30/21  0840 06/14/21  0800 06/07/21  1150    138 137   POTASSIUM 3.8 3.9 3.7   CHLORIDE 100 97 99   CO2 34* 39* 32   BUN 18 16 14   CR 0.98 1.03 1.04   ANIONGAP 3 2* 6   RAJ 9.1 9.1 8.7   GLC 88 61* 95     Most Recent TSH and T4:  Recent Labs   Lab Test 05/18/21  1938   TSH 1.95         ASSESSMENT/PLAN:     (J96.11) Chronic respiratory failure with hypoxia (H)  (primary encounter diagnosis)  (J44.9) Chronic obstructive pulmonary disease, unspecified COPD type (H)  Comment: Chronic respiratory failure, O2 sats stable on 2L O2. Chronic COPD. Hx sleep apnea, noncompliant with CPAP.   Plan:   - Continue O2 2L  - Monitor O2 sats qshift and with therapy, keep O2 > 90%.     (I69.391) Dysphagia due to recent cerebrovascular accident (CVA)  Comment: Chronic dysphagia r/t prior CVA  Plan:   - Check BMP 7/14  - Continue regular diet w/nectar thick liquids  - Continue to encourage patient to be up in chair w/meals in dining room for increased supervision  - Monitor intake, weights    (I82.451) Acute deep vein thrombosis (DVT) of right peroneal vein (H)  Comment: Acute R peroneal vein DVT 5/19/21, s/p IVC filter placement on 5/24.  Plan:   - Continue ASA (restarted on 5/30), metoprolol  - Patient to follow-up with Vascular Medicine to review anticoagulation and IVC filter management    (I69.354) Hemiparesis affecting left side as late effect of cerebrovascular accident (CVA) (H)  (R53.81) Physical deconditioning  Comment: Chronic left-sided hemiparesis r/t prior CVA with chronic physical deconditioning. PTA lives w/spouse.  Nonambulatory, wheelchair bound. Uses aleks lift for transfers. Requires assistance with ADLs.  Plan:   - Up in chair w/meals, APM due to immobility  -  following for discharge planning. Goal to discharge to [facility] w/spouse.    (D50.9) Iron deficiency anemia, unspecified iron deficiency anemia type  Comment: Hgb stable. Hx recent upper GI bleed, colonoscopy 5/29 found normal mucosa of colon, diverticulosis. Required s/p 1 unit PRBC 5/24 & 5/28, also received IV venofer x 2 doses in 5/2021.   Plan:   - Check Hgb on 7/14   - Continue ferrous sulfate, protonix  - Patient to follow-up with Dr. Davis (GI) in 2-4 weeks for capsule endoscopy consideration        Electronically signed by:  ELIZABETH Newsome CNP

## 2021-07-13 ENCOUNTER — LAB REQUISITION (OUTPATIENT)
Dept: LAB | Facility: CLINIC | Age: 81
End: 2021-07-13
Payer: COMMERCIAL

## 2021-07-13 DIAGNOSIS — I50.9 HEART FAILURE, UNSPECIFIED (H): ICD-10-CM

## 2021-07-13 DIAGNOSIS — D64.9 ANEMIA, UNSPECIFIED: ICD-10-CM

## 2021-07-14 LAB
ANION GAP SERPL CALCULATED.3IONS-SCNC: 3 MMOL/L (ref 3–14)
BUN SERPL-MCNC: 22 MG/DL (ref 7–30)
CALCIUM SERPL-MCNC: 9 MG/DL (ref 8.5–10.1)
CHLORIDE BLD-SCNC: 102 MMOL/L (ref 94–109)
CO2 SERPL-SCNC: 36 MMOL/L (ref 20–32)
CREAT SERPL-MCNC: 0.97 MG/DL (ref 0.66–1.25)
GFR SERPL CREATININE-BSD FRML MDRD: 74 ML/MIN/1.73M2
GLUCOSE BLD-MCNC: 70 MG/DL (ref 70–99)
HGB BLD-MCNC: 10 G/DL (ref 13.3–17.7)
POTASSIUM BLD-SCNC: 3.9 MMOL/L (ref 3.4–5.3)
SODIUM SERPL-SCNC: 141 MMOL/L (ref 133–144)

## 2021-07-14 PROCEDURE — P9604 ONE-WAY ALLOW PRORATED TRIP: HCPCS | Mod: ORL | Performed by: NURSE PRACTITIONER

## 2021-07-14 PROCEDURE — 85018 HEMOGLOBIN: CPT | Mod: ORL | Performed by: NURSE PRACTITIONER

## 2021-07-14 PROCEDURE — 36415 COLL VENOUS BLD VENIPUNCTURE: CPT | Mod: ORL | Performed by: NURSE PRACTITIONER

## 2021-07-14 PROCEDURE — 80048 BASIC METABOLIC PNL TOTAL CA: CPT | Mod: ORL | Performed by: NURSE PRACTITIONER

## 2021-07-14 NOTE — PROGRESS NOTES
Face to Face and Medical Necessity Statement for DME Provider visit    Demographic Information on Ron Gilmore:  Gender: male  : 1940  [address]   [place] [address] [phone] (home)     Medical Record: [id number]  Social Security Number: [id number]  Primary Care Provider: Augustin Thomas  Insurance: Payor: [facility] / Plan: [facility] MEDICARE NON [place] PART[employer]ERS / Product Type: HMO /     HPI:   Ron Gilmore is a 80 year old  (1940), who is being seen today for a face to face provider visit at [facility]; medical necessity statement for DME included. This patient requires the following:    DME Ordered and Medical Necessity Statement   Group I support mattress  Patient requires APM due to immobility secondary to hemiparesis affecting left side due to prior CVA.   Requires chirinos catheter secondary to BPH with incomplete bladder emptying.      Pt needing above DME with expected length of need of 99 years due to medical necessity associated with following diagnosis:     Hemiparesis affecting left side as late effect of cerebrovascular accident (CVA) (H)  Physical deconditioning  Benign prostatic hyperplasia with incomplete bladder emptying      PMH   has a past medical history of BPH (benign prostatic hyperplasia), Cataract, Cholelithiasis, COPD (chronic obstructive pulmonary disease) (H), Depression, Diabetes mellitus, Dyshidrotic foot dermatitis, Edema, Gout, Hyperlipidemia, Hypertension, Kidney stones, Lumbago, Lumbar disc displacement without myelopathy, Muscle weakness, Neuropathy, diabetic (H), Obesity, Spinal stenosis, Stroke (H), Unsteady gait, Urinary retention with incomplete bladder emptying, and UTI (urinary tract infection). He also has no past medical history of Asymptomatic human immunodeficiency virus (HIV) infection status (H), Blood in semen, Cerebral palsy (H), Complication of anesthesia, Congenital renal agenesis and dysgenesis, Difficult  intubation, Epididymitis, bilateral, Epididymitis, left, Epididymitis, right, Goiter, Hernia, abdominal, History of spinal cord injury, History of thrombophlebitis, Horseshoe kidney, Hydrocephalus (H), Malignant hyperthermia, Mumps, Orchitis, epididymitis, and epididymo-orchitis, with abscess, Palpitations, Parkinsons disease (H), Penile discharge, PONV (postoperative nausea and vomiting), Prostate infection, Spider veins, Spina bifida (H), Spinal headache, STD (sexually transmitted disease), Swelling of testicle, Tethered cord (H), or Tuberculosis.      ROS:10 point ROS of systems including Constitutional, Eyes, Respiratory, Cardiovascular, Gastroenterology, Genitourinary, Integumentary, Musculoskeletal, Psychiatric were all negative except for pertinent positives noted in my HPI.      EXAM  Vitals: /68   Pulse 86   Temp 98.9  F (37.2  C)   Resp 18   Ht 1.829 m (6')   Wt 105.9 kg (233 lb 8 oz)   SpO2 95%   BMI 31.67 kg/m  ;BMI= Body mass index is 31.67 kg/m .  GENERAL APPEARANCE:  Alert, oriented, cooperative.  ENT:  Mouth and posterior oropharynx normal, moist mucous membranes, [place]  EYES:  EOM, conjunctivae, lids, pupils and irises normal, PERRL  RESP:  respiratory effort and palpation of chest normal, lungs clear to auscultation , no respiratory distress, diminished breath sounds at bases  CV:  Palpation and auscultation of heart done , regular rate and rhythm, no murmur, rub, or gallop, no edema  M/S:   Gait and station abnormal, wheelchair bound. Uses aleks lift for transfers. L sided hemiparesis. Digits and nails normal  SKIN:  Inspection of skin and subcutaneous tissue baseline  NEURO:   Cranial nerves 2-12 are normal tested and grossly at patient's baseline  PSYCH:  Oriented X 3, affect and mood normal       ASSESSMENT/PLAN:  1. Hemiparesis affecting left side as late effect of cerebrovascular accident (CVA) (H)    2. Physical deconditioning    3. Benign prostatic hyperplasia with incomplete  bladder emptying        Orders:  1. Facility staff/TC to contact DME company to get their order form for provider to fill out    ELECTRONICALLY SIGNED BY TEE CERTIFIED PROVIDER:  ELIZABETH Newsome CNP   NPI: [id number]  [facility]  [address] [place] [address], [address]  [place] [address]

## 2021-07-15 ENCOUNTER — TRANSITIONAL CARE UNIT VISIT (OUTPATIENT)
Dept: GERIATRICS | Facility: CLINIC | Age: 81
End: 2021-07-15
Payer: COMMERCIAL

## 2021-07-15 VITALS
WEIGHT: 233.5 LBS | BODY MASS INDEX: 31.63 KG/M2 | SYSTOLIC BLOOD PRESSURE: 117 MMHG | HEART RATE: 86 BPM | OXYGEN SATURATION: 95 % | RESPIRATION RATE: 18 BRPM | TEMPERATURE: 98.9 F | DIASTOLIC BLOOD PRESSURE: 68 MMHG | HEIGHT: 72 IN

## 2021-07-15 DIAGNOSIS — R39.14 BENIGN PROSTATIC HYPERPLASIA WITH INCOMPLETE BLADDER EMPTYING: ICD-10-CM

## 2021-07-15 DIAGNOSIS — R53.81 PHYSICAL DECONDITIONING: ICD-10-CM

## 2021-07-15 DIAGNOSIS — I69.354 HEMIPARESIS AFFECTING LEFT SIDE AS LATE EFFECT OF CEREBROVASCULAR ACCIDENT (CVA) (H): Primary | ICD-10-CM

## 2021-07-15 DIAGNOSIS — N40.1 BENIGN PROSTATIC HYPERPLASIA WITH INCOMPLETE BLADDER EMPTYING: ICD-10-CM

## 2021-07-15 PROCEDURE — 99309 SBSQ NF CARE MODERATE MDM 30: CPT | Performed by: NURSE PRACTITIONER

## 2021-07-15 ASSESSMENT — MIFFLIN-ST. JEOR: SCORE: 1817.15

## 2021-07-16 NOTE — PROGRESS NOTES
[facility]    [place] Medical Record Number:  [id number]  Place of Service where encounter took place:  [facility] [[id number]]  Chief Complaint   Patient presents with     RECHECK     DME       HPI:    Ron Gilmore  is a 80 year old (1940), who is being seen today for an episodic care visit.  HPI information obtained from: facility chart records, facility staff, patient report and [facility] chart review.     PMH: hx CVA with residual left-sided weakness, COPD, depression, dysphagia, dyslipidemia, depression, diabetes mellitus type 2 diet-controlled. Hx COVID (5/1/21).       Today's concern is:    During exam, patient seen resting in bed. Reports doing well. States family is brining PTA equipment to [facility] this weekend. Goal is to discharge to [facility] w/spouse on 7/21. States working with SW on discharge planning. Has hospital bed, but does not have an air pressure mattress.         Past Medical and Surgical History reviewed in Epic today.    MEDICATIONS:    Current Outpatient Medications   Medication Sig Dispense Refill     acetaminophen (TYLENOL) 325 MG tablet Take 650 mg by mouth every 4 hours as needed for mild pain as needed for pain/fever Max dose 3000mg/24hr       allopurinol (ZYLOPRIM) 300 MG tablet Take 300 mg by mouth daily       aspirin (ASA) 81 MG EC tablet Take 1 tablet (81 mg) by mouth daily       atorvastatin (LIPITOR) 10 MG tablet Take 10 mg by mouth daily       cyanocobalamin (VITAMIN B-12) 500 MCG SUBL sublingual tablet Place 1 tablet (500 mcg) under the tongue daily       Emollient (AMLACTIN ULTRA EX) Apply topically daily as needed TO FEET       ferrous sulfate (FEROSUL) 325 (65 Fe) MG tablet Take 325 mg by mouth daily       furosemide (LASIX) 40 MG tablet Take 1 tablet (40 mg) by mouth daily       ipratropium-albuterol (COMBIVENT RESPIMAT)  MCG/ACT inhaler Inhale 1 puff into the lungs 4 times daily 0800, 1200 ,1600 ,2000       metoprolol  tartrate (LOPRESSOR) 25 MG tablet Take 0.5 tablets (12.5 mg) by mouth 2 times daily       pantoprazole (PROTONIX) 40 MG EC tablet Take 1 tablet (40 mg) by mouth 2 times daily (before meals)       PARoxetine (PAXIL) 20 MG tablet Take 20 mg by mouth daily       polyethylene glycol (MIRALAX) 17 GM/Dose powder Take 17 g by mouth daily 510 g          REVIEW OF SYSTEMS:  4 point ROS including Respiratory, CV, GI and , other than that noted in the HPI,  is negative      Objective:  /68   Pulse 86   Temp 98.9  F (37.2  C)   Resp 18   Ht 1.829 m (6')   Wt 105.9 kg (233 lb 8 oz)   SpO2 95%   BMI 31.67 kg/m    Exam:  GENERAL APPEARANCE:  Alert, oriented, cooperative.  ENT:  Mouth and posterior oropharynx normal, moist mucous membranes, [place]  EYES:  EOM, conjunctivae, lids, pupils and irises normal, PERRL  RESP:  respiratory effort and palpation of chest normal, lungs clear to auscultation , no respiratory distress, diminished breath sounds at bases  CV:  Palpation and auscultation of heart done , regular rate and rhythm, no murmur, rub, or gallop, no edema  M/S:   Gait and station abnormal, wheelchair bound. Uses aleks lift for transfers. L sided hemiparesis. Digits and nails normal  : Cardona intact w/adequate output  SKIN:  Inspection of skin and subcutaneous tissue baseline  NEURO:   Cranial nerves 2-12 are normal tested and grossly at patient's baseline  PSYCH:  Oriented X 3, affect and mood normal      Labs:   Labs done in SNF are in [facility]. Please refer to them using [employer]/Care Everywhere., Recent labs in EPIC reviewed by me today.  and   Most Recent 3 CBC's:Recent Labs   Lab Test 07/14/21  0600 06/30/21  0840 06/14/21  0800 06/07/21  1150 06/03/21  0603   WBC  --  7.7  --  7.8 6.3   HGB 10.0* 9.6* 10.0* 10.1* 8.2*   MCV  --  99  --  99 96   PLT  --  227  --  198 191     Most Recent 3 BMP's:Recent Labs   Lab Test 07/14/21  0600 06/30/21  0840 06/14/21  0800    137 138   POTASSIUM 3.9 3.8 3.9    CHLORIDE 102 100 97   CO2 36* 34* 39*   BUN 22 18 16   CR 0.97 0.98 1.03   ANIONGAP 3 3 2*   RAJ 9.0 9.1 9.1   GLC 70 88 61*         ASSESSMENT/PLAN:    (I69.354) Hemiparesis affecting left side as late effect of cerebrovascular accident (CVA) (H)  (primary encounter diagnosis)  (R53.81) Physical deconditioning  (N40.1,  R39.14) Benign prostatic hyperplasia with incomplete bladder emptying  Comment: Chronic left-sided hemiparesis r/t prior CVA with chronic physical deconditioning. PTA lives w/spouse. Nonambulatory, wheelchair bound. Uses aleks lift for transfers. Requires assistance with ADLs.  Plan:   - Ordering DME: group I support mattress due to immobility secondary to prior CVA and physical deconditioning  - Up in chair w/meals  - Requires chirinos due to incomplete bladder emptying secondary to BPH  - SW following for discharge planning. States MA pending, likely will discharge to [facility] on 7/21 if paperwork approved        Electronically signed by:  ELIZABETH Newsome CNP

## 2021-07-18 ASSESSMENT — MIFFLIN-ST. JEOR: SCORE: 1595.34

## 2021-07-19 ENCOUNTER — DISCHARGE SUMMARY NURSING HOME (OUTPATIENT)
Dept: GERIATRICS | Facility: CLINIC | Age: 81
End: 2021-07-19
Payer: COMMERCIAL

## 2021-07-19 VITALS
SYSTOLIC BLOOD PRESSURE: 112 MMHG | BODY MASS INDEX: 25 KG/M2 | DIASTOLIC BLOOD PRESSURE: 72 MMHG | HEIGHT: 72 IN | RESPIRATION RATE: 18 BRPM | WEIGHT: 184.6 LBS | HEART RATE: 68 BPM | TEMPERATURE: 97 F | OXYGEN SATURATION: 94 %

## 2021-07-19 DIAGNOSIS — I48.0 PAROXYSMAL ATRIAL FIBRILLATION (H): ICD-10-CM

## 2021-07-19 DIAGNOSIS — R53.81 PHYSICAL DECONDITIONING: ICD-10-CM

## 2021-07-19 DIAGNOSIS — R39.14 BENIGN PROSTATIC HYPERPLASIA WITH INCOMPLETE BLADDER EMPTYING: ICD-10-CM

## 2021-07-19 DIAGNOSIS — D50.9 IRON DEFICIENCY ANEMIA, UNSPECIFIED IRON DEFICIENCY ANEMIA TYPE: ICD-10-CM

## 2021-07-19 DIAGNOSIS — I69.391 DYSPHAGIA DUE TO RECENT CEREBROVASCULAR ACCIDENT (CVA): ICD-10-CM

## 2021-07-19 DIAGNOSIS — J96.11 CHRONIC RESPIRATORY FAILURE WITH HYPOXIA (H): Primary | ICD-10-CM

## 2021-07-19 DIAGNOSIS — F33.9 EPISODE OF RECURRENT MAJOR DEPRESSIVE DISORDER, UNSPECIFIED DEPRESSION EPISODE SEVERITY (H): ICD-10-CM

## 2021-07-19 DIAGNOSIS — E66.01 MORBID OBESITY, UNSPECIFIED OBESITY TYPE (H): ICD-10-CM

## 2021-07-19 DIAGNOSIS — I69.354 HEMIPARESIS AFFECTING LEFT SIDE AS LATE EFFECT OF CEREBROVASCULAR ACCIDENT (CVA) (H): ICD-10-CM

## 2021-07-19 DIAGNOSIS — I50.9 CHRONIC CONGESTIVE HEART FAILURE, UNSPECIFIED HEART FAILURE TYPE (H): ICD-10-CM

## 2021-07-19 DIAGNOSIS — N40.1 BENIGN PROSTATIC HYPERPLASIA WITH INCOMPLETE BLADDER EMPTYING: ICD-10-CM

## 2021-07-19 DIAGNOSIS — I82.451 ACUTE DEEP VEIN THROMBOSIS (DVT) OF RIGHT PERONEAL VEIN (H): ICD-10-CM

## 2021-07-19 DIAGNOSIS — J44.9 CHRONIC OBSTRUCTIVE PULMONARY DISEASE, UNSPECIFIED COPD TYPE (H): ICD-10-CM

## 2021-07-19 PROCEDURE — 99316 NF DSCHRG MGMT 30 MIN+: CPT | Performed by: NURSE PRACTITIONER

## 2021-07-19 RX ORDER — CALAMINE, MENTHOL, WHITE PETROLATUM, ZINC OXIDE .5; .2; 69; 19.5 G/100G; G/100G; G/100G; G/100G
PASTE TOPICAL 2 TIMES DAILY
COMMUNITY
End: 2023-04-03

## 2021-07-19 NOTE — PROGRESS NOTES
[facility] DISCHARGE SUMMARY    PATIENT'S NAME: Ron Gilmore  YOB: 1940  MEDICAL RECORD NUMBER:  [id number]  Place of Service where encounter took place:  [facility] ([facility]) [[id number]]    PRIMARY CARE PROVIDER AND CLINIC RESPONSIBLE AFTER TRANSFER:   Augustin Thomas MD, [address] / [place] [address]    [facility] Provider     Transferring providers: ELIZABETH Newsome CNP, Dr. Nic Garland MD  Recent Hospitalization/ED:  [facility] stay 5/18/21 to 6/3/21.  Date of SNF Admission: June 03, 2021  Date of SNF (anticipated) Discharge: July 22, 2021  Discharged to: [facility]  Cognitive Scores: BIMS: 11/15  Physical Function: Nonambulatory. Assist x 2 with aleks lift.   DME: Wheelchair, Hospital Bed and Home Oxygen    CODE STATUS/ADVANCE DIRECTIVES DISCUSSION:  Full Code   ALLERGIES: Patient has no known allergies.      DISCHARGE DIAGNOSIS/NURSING FACILITY COURSE:     PMH: hx CVA with residual left-sided weakness, COPD, depression, dysphagia, dyslipidemia, depression, diabetes mellitus type 2 diet-controlled     Patient admitted to [facility] 5/1-5/4/21 due to SOB and vomiting. Tested + COVID on 5/1. Was placed on 4L O2. CXR + shallow inspiration, no clear infiltrates. Received 1st COVID vaccine previously, missed 2nd dose due to hospitalization in 03/2021. Was treated with dexamethasone and remdesevir. Per hospital notes, had low d-dimer 0.6 and short term anticoagulation prophylaxis was not indicated. Also noted to have UTI associated with chronic indwelling catheter, UC + E faecalis and was started on IV ceftriaxone then transitioned to course of ciprofloxacin upon discharge. Transferred to [facility] on 5/4.      Readmitted to [facility] 5/10-5/17/21 due to acute respiratory failure due to dysphagia. CT chest negative for PE. CXR + bilateral infiltrates, was started on zosyn and macrobid for pneumonia and also noted to  have catheter associated UTI. Was discharged back to [facility].     Patient admitted to [facility] 5/18-6/3/21 due to acute on chronic respiratory failure, sepsis associated with HCAP. CT chest negative for PE, but segmental or smaller not ruled out due to motion artifact. Lung showed emphysema and patchy lower lobe infiltrates but small, no pulmonary edema. BLE venous US 5/19/21 + R peroneal vein DVT. Was started on lovenox, then held on 5/23 due to probable GI bleed. S/p IVC filter on 5/24. Completed 10-days of zosyn on 5/28 for HCAP. O2 requirements increased on 5/29 up to 6L, CT chest with extensive bilateral infiltrates. Pulmonology consulted, concern for aspiration associated with worsening infiltrates, was started on course of augmentin. Recommending to follow-up with Vascular for anticoagulation recommendations/IVC filter management. Acute anemia w/melanic stool 5/23, GI consulted, patient initially declined EGD then was completed on 5/28 w/normal findings. Colonoscopy 5/29 found normal mucosa of the colon, diverticulosis. GI recommending outpatient capsule endoscopy. Received 1unit PRBC 5/24, 5/28; also received IV venofer x 2 doses. Noted to have intermittent atrial fibrillation, not on anticoagulation; continued ASA on 5/30.      Transferred back to [facility] on 6/3/21.        Chronic respiratory failure with hypoxia (H)  Chronic obstructive pulmonary disease, unspecified COPD type (H)  Chronic respiratory failure, O2 sats stable on 2L O2 (baseline). Chronic COPD. Hx sleep apnea, noncompliant with CPAP. Hx COVID 5/1/21. Recently treated for HCAP, completed course of antibiotics on 6/8. Currently taking combivent inhaler QID. Denies SOB, coughing, wheezing, fevers.    Acute deep vein thrombosis (DVT) of right peroneal vein (H)  Paroxysmal atrial fibrillation (H)  Acute R peroneal vein DVT 5/19/21, s/p IVC filter placement on 5/24. Chronic A fib. Was restarted on ASA 5/20. Also taking metoprolol. Patient to  follow-up with Vascular Medicine (Dr. Jurado) on 8/11 to review anticoagulation and IVC filter management.    Chronic congestive heart failure, unspecified heart failure type (H)  Continue ASA, metoprolol, lasix, lipitor. Patient to follow-up with Dr. Parisi (Cardiologist).    Dysphagia due to recent cerebrovascular accident (CVA)  Chronic dysphagia r/t prior CVA. Requires regular diet w/nectar thick liquids. Recommending patient be up in wheelchair w/meals with staff supervision. High risk for aspiration.    Benign prostatic hyperplasia with incomplete bladder emptying  Chronic chirinos catheter due to BPH. Patient to follow-up with Dr. Sullivan (Urologist) as directed.    Iron deficiency anemia, unspecified iron deficiency anemia type  Hgb stable. Hx recent upper GI bleed, colonoscopy 5/29 found normal mucosa of colon, diverticulosis. Required s/p 1 unit PRBC 5/24 & 5/28, also received IV venofer x 2 doses in 5/2021. Continue ferrous sulfate, protonix. Recommending to follow-up with Dr. Davis on 8/3 for capsule endoscopy consideration.    Hemoglobin   Date Value Ref Range Status   07/14/2021 10.0 (L) 13.3 - 17.7 g/dL Final   06/30/2021 9.6 (L) 13.3 - 17.7 g/dL Final       Hemiparesis affecting left side as late effect of cerebrovascular accident (CVA) (H)  Morbid obesity, unspecified obesity type (H)  Physical deconditioning  Chronic left-sided hemiparesis r/t prior CVA with chronic physical deconditioning. PTA lives w/spouse. Nonambulatory, wheelchair bound. Uses aleks lift for transfers. Requires assistance with ADLs. Ordered APM for discharge. Patient discharging to [facility], as well as  home care services, including home PT, OT, RN, HHA.     Episode of recurrent major depressive disorder, unspecified depression episode severity (H)  Continue paxil. No changes in mood or behavior.         Past Medical History:  has a past medical history of BPH (benign prostatic hyperplasia), Cataract, Cholelithiasis, COPD (chronic  obstructive pulmonary disease) (H), Depression, Diabetes mellitus, Dyshidrotic foot dermatitis, Edema, Gout, Hyperlipidemia, Hypertension, Kidney stones, Lumbago, Lumbar disc displacement without myelopathy, Muscle weakness, Neuropathy, diabetic (H), Obesity, Spinal stenosis, Stroke (H), Unsteady gait, Urinary retention with incomplete bladder emptying, and UTI (urinary tract infection). He also has no past medical history of Asymptomatic human immunodeficiency virus (HIV) infection status (H), Blood in semen, Cerebral palsy (H), Complication of anesthesia, Congenital renal agenesis and dysgenesis, Difficult intubation, Epididymitis, bilateral, Epididymitis, left, Epididymitis, right, Goiter, Hernia, abdominal, History of spinal cord injury, History of thrombophlebitis, Horseshoe kidney, Hydrocephalus (H), Malignant hyperthermia, Mumps, Orchitis, epididymitis, and epididymo-orchitis, with abscess, Palpitations, Parkinsons disease (H), Penile discharge, PONV (postoperative nausea and vomiting), Prostate infection, Spider veins, Spina bifida (H), Spinal headache, STD (sexually transmitted disease), Swelling of testicle, Tethered cord (H), or Tuberculosis.    Discharge Medications:    Current Outpatient Medications   Medication Sig Dispense Refill     acetaminophen (TYLENOL) 325 MG tablet Take 650 mg by mouth every 4 hours as needed for mild pain as needed for pain/fever Max dose 3000mg/24hr       allopurinol (ZYLOPRIM) 300 MG tablet Take 300 mg by mouth daily       aspirin (ASA) 81 MG EC tablet Take 1 tablet (81 mg) by mouth daily       atorvastatin (LIPITOR) 10 MG tablet Take 10 mg by mouth daily       cyanocobalamin (VITAMIN B-12) 500 MCG SUBL sublingual tablet Place 1 tablet (500 mcg) under the tongue daily       Emollient (AMLACTIN ULTRA EX) Apply topically daily as needed TO FEET       ferrous sulfate (FEROSUL) 325 (65 Fe) MG tablet Take 325 mg by mouth daily       furosemide (LASIX) 40 MG tablet Take 1 tablet  (40 mg) by mouth daily       ipratropium-albuterol (COMBIVENT RESPIMAT)  MCG/ACT inhaler Inhale 1 puff into the lungs 4 times daily 0800, 1200 ,1600 ,2000       metoprolol tartrate (LOPRESSOR) 25 MG tablet Take 0.5 tablets (12.5 mg) by mouth 2 times daily       pantoprazole (PROTONIX) 40 MG EC tablet Take 1 tablet (40 mg) by mouth 2 times daily (before meals)       PARoxetine (PAXIL) 20 MG tablet Take 20 mg by mouth daily       polyethylene glycol (MIRALAX) 17 GM/Dose powder Take 17 g by mouth daily 510 g      Skin Protectants, Misc. (CALAZIME SKIN PROTECTANT) PSTE Externally apply topically 2 times daily Apply to buttocks topically for Excoriation to buttocks/unstageable Apply thin layer to buttocks  Also taking Apply to buttocks topically as needed for excoriation to butocks unstageable with incontinence episode         Medication Changes/Rationale:     Started on ferrous sulfate due to anemia    Controlled medications sent with patient:   not applicable/none         ROS:   10 point ROS of systems including Constitutional, Eyes, Respiratory, Cardiovascular, Gastroenterology, Genitourinary, Integumentary, Musculoskeletal, Psychiatric were all negative except for pertinent positives noted in my HPI.      Physical Exam:   Vitals: /72   Pulse 68   Temp 97  F (36.1  C)   Resp 18   Ht 1.829 m (6')   Wt 83.7 kg (184 lb 9.6 oz)   SpO2 94%   BMI 25.04 kg/m    BMI= Body mass index is 25.04 kg/m .  GENERAL APPEARANCE:  Alert, oriented, cooperative.  ENT:  Mouth and posterior oropharynx normal, moist mucous membranes, [place]  EYES:  EOM, conjunctivae, lids, pupils and irises normal, PERRL  RESP:  respiratory effort and palpation of chest normal, lungs clear to auscultation , no respiratory distress, diminished breath sounds at bases  CV:  Palpation and auscultation of heart done , regular rate and rhythm, no murmur, rub, or gallop, no edema  M/S:   Gait and station abnormal, wheelchair bound. Uses aleks lift  for transfers. L sided hemiparesis. Digits and nails normal  : Cardona intact w/adequate output  SKIN:  Inspection of skin and subcutaneous tissue baseline  NEURO:   Cranial nerves 2-12 are normal tested and grossly at patient's baseline  PSYCH:  Oriented X 3, affect and mood normal      SNF labs: Labs done in SNF are in [facility] EPIC. Please refer to them using EPIC/Care Everywhere., Recent labs in EPIC reviewed by me today.  and   Most Recent 3 CBC's:  Recent Labs   Lab Test 07/14/21  0600 06/30/21  0840 06/14/21  0800 06/07/21  1150 06/03/21  0603   WBC  --  7.7  --  7.8 6.3   HGB 10.0* 9.6* 10.0* 10.1* 8.2*   MCV  --  99  --  99 96   PLT  --  227  --  198 191     Most Recent 3 BMP's:  Recent Labs   Lab Test 07/14/21  0600 06/30/21  0840 06/14/21  0800    137 138   POTASSIUM 3.9 3.8 3.9   CHLORIDE 102 100 97   CO2 36* 34* 39*   BUN 22 18 16   CR 0.97 0.98 1.03   ANIONGAP 3 3 2*   RAJ 9.0 9.1 9.1   GLC 70 88 61*     Most Recent TSH and T4:  Recent Labs   Lab Test 05/18/21  1938   TSH 1.95         DISCHARGE PLAN:    Follow up labs: No labs orders/due      Medical Follow Up:      Follow up with primary care provider in 1 weeks   Patient to follow-up with Vascular Medicine for evaluation DVT, anticoagulation, and IVC filter management on 8/11   Patient to follow-up with Dr. Parisi (Cardiologist) as directed   Patient to follow-up with Dr. Davis (GI) for capsule endoscopy consideration on 8/3   Patient to follow-up with Dr. Sullivan (Urologist) as directed.    Current [place] scheduled appointments:   Future Appointments   Date Time Provider Department Center   8/11/2021 11:00 AM Kristi Jurado MD [facility]       Discharge Services: Home Care:  Occupational Therapy, Physical Therapy, Registered Nurse, Home Health Aide and From:  [facility]      Discharge Instructions Verbalized to Patient at Discharge:     Weigh yourself daily in the morning and keep a record. Call your primary clinic: a) if  you are more short of breath, or b) if your weight changes more than 3 pounds in one day or more than 5 pounds in one week.     Notify PCP if you have a fever greater than 100.5 degrees.     Oxygen at 2 liters/minute via nasal cannula.     Cardona catheter: size 16 French; last changed 7/4/21; change every 4 weeks and as needed.     Continue regular diet with nectar thick liquids    24-hour supervision is recommended for safety.         TOTAL DISCHARGE TIME:   Greater than 30 minutes    Electronically signed by:  ELIZABETH Newsome CNP

## 2021-07-20 NOTE — PROGRESS NOTES
Face to Face and Medical Necessity Statement for DME Provider visit    Demographic Information on Ron Gilmore:  Gender: male  : 1940  [address]   [place] [address]  [phone] (home)     Medical Record: [id number]  Social Security Number: [id number]  Primary Care Provider: Augustin Thomas  Insurance: Payor: [facility] / Plan: ARE MEDICARE NON [employer] PARTNERS / Product Type: HMO /     HPI:   Ron Gilmore is a 80 year old  (1940), who is being seen today for a face to face provider visit at [facility]; medical necessity statement for DME included. This patient requires the following:    DME Ordered and Medical Necessity Statement   Oxygen: 2 L/min via nasal cannula continuous ; Clinical Documentation: (Obtain documented RA sat with in 2 days of discharge in acute setting or within 30 days of provider visit):  Method 1: Room air at rest: Qualifing sat level is < 88% or below on room air at rest on 21 date.      Pt needing above DME with expected length of need of 99 years  due to medical necessity associated with following diagnosis:     Chronic respiratory failure with hypoxia (H)  Chronic obstructive pulmonary disease, unspecified COPD type (H)  Acute deep vein thrombosis (DVT) of right peroneal vein (H)  Paroxysmal atrial fibrillation (H)  Chronic congestive heart failure, unspecified heart failure type (H)  Dysphagia due to recent cerebrovascular accident (CVA)  Benign prostatic hyperplasia with incomplete bladder emptying  Iron deficiency anemia, unspecified iron deficiency anemia type  Hemiparesis affecting left side as late effect of cerebrovascular accident (CVA) (H)  Morbid obesity, unspecified obesity type (H)  Physical deconditioning  Episode of recurrent major depressive disorder, unspecified depression episode severity (H)      PMH   has a past medical history of BPH (benign prostatic hyperplasia), Cataract, Cholelithiasis, COPD (chronic  obstructive pulmonary disease) (H), Depression, Diabetes mellitus, Dyshidrotic foot dermatitis, Edema, Gout, Hyperlipidemia, Hypertension, Kidney stones, Lumbago, Lumbar disc displacement without myelopathy, Muscle weakness, Neuropathy, diabetic (H), Obesity, Spinal stenosis, Stroke (H), Unsteady gait, Urinary retention with incomplete bladder emptying, and UTI (urinary tract infection). He also has no past medical history of Asymptomatic human immunodeficiency virus (HIV) infection status (H), Blood in semen, Cerebral palsy (H), Complication of anesthesia, Congenital renal agenesis and dysgenesis, Difficult intubation, Epididymitis, bilateral, Epididymitis, left, Epididymitis, right, Goiter, Hernia, abdominal, History of spinal cord injury, History of thrombophlebitis, Horseshoe kidney, Hydrocephalus (H), Malignant hyperthermia, Mumps, Orchitis, epididymitis, and epididymo-orchitis, with abscess, Palpitations, Parkinsons disease (H), Penile discharge, PONV (postoperative nausea and vomiting), Prostate infection, Spider veins, Spina bifida (H), Spinal headache, STD (sexually transmitted disease), Swelling of testicle, Tethered cord (H), or Tuberculosis.    ROS:10 point ROS of systems including Constitutional, Eyes, Respiratory, Cardiovascular, Gastroenterology, Genitourinary, Integumentary, Musculoskeletal, Psychiatric were all negative except for pertinent positives noted in my HPI.    EXAM  Vitals: /72   Pulse 68   Temp 97  F (36.1  C)   Resp 18   Ht 1.829 m (6')   Wt 83.7 kg (184 lb 9.6 oz)   SpO2 94%   BMI 25.04 kg/m  ;BMI= Body mass index is 25.04 kg/m .  GENERAL APPEARANCE:  Alert, oriented, cooperative.  ENT:  Mouth and posterior oropharynx normal, moist mucous membranes, [place]  EYES:  EOM, conjunctivae, lids, pupils and irises normal, PERRL  RESP:  respiratory effort and palpation of chest normal, lungs clear to auscultation , no respiratory distress, diminished breath sounds at  bases  CV:  Palpation and auscultation of heart done , regular rate and rhythm, no murmur, rub, or gallop, no edema  M/S:   Gait and station abnormal, wheelchair bound. Uses aelks lift for transfers. L sided hemiparesis. Digits and nails normal  : Cardona intact w/adequate output  SKIN:  Inspection of skin and subcutaneous tissue baseline  NEURO:   Cranial nerves 2-12 are normal tested and grossly at patient's baseline  PSYCH:  Oriented X 3, affect and mood normal    ASSESSMENT/PLAN:  1. Chronic respiratory failure with hypoxia (H)    2. Chronic obstructive pulmonary disease, unspecified COPD type (H)    3. Acute deep vein thrombosis (DVT) of right peroneal vein (H)    4. Paroxysmal atrial fibrillation (H)    5. Chronic congestive heart failure, unspecified heart failure type (H)    6. Dysphagia due to recent cerebrovascular accident (CVA)    7. Benign prostatic hyperplasia with incomplete bladder emptying    8. Iron deficiency anemia, unspecified iron deficiency anemia type    9. Hemiparesis affecting left side as late effect of cerebrovascular accident (CVA) (H)    10. Morbid obesity, unspecified obesity type (H)    11. Physical deconditioning    12. Episode of recurrent major depressive disorder, unspecified depression episode severity (H)        Orders:  1. Facility staff/TC to contact DME company to get their order form for provider to fill out    ELECTRONICALLY SIGNED BY TEE CERTIFIED PROVIDER:  ELIZABETH Newsome CNP   NPI: [id number]  [facility]  [address] [place] [address], [address]  [place] [address]

## 2021-07-21 NOTE — PROGRESS NOTES
[facility] Discharge Orders    Name: Ron Gilmore  : 1940  Planned Discharge Date: 21  Discharged to: new assisted living for patient - [facility]      MEDICAL FOLLOW UP              Follow up with primary care provider in 1 weeks              Patient to follow-up with Vascular Medicine for evaluation DVT, anticoagulation, and IVC filter management on               Patient to follow-up with Dr. Parisi (Cardiologist) as directed              Patient to follow-up with Dr. Davis (GI) for capsule endoscopy consideration on 8/3              Patient to follow-up with Dr. Sullivan (Urologist) as directed.      FUTURE LABS: No labs orders/due      ORDER CHANGES:    Started on ferrous sulfate due to anemia    DISCHARGE MEDICATIONS:  The patient s pharmacy is authorized to dispense a 30-day supply of medications. Refill requests should be directed to the primary provider, Augustin Thomas.   No narcotics are prescribed at time of discharge.     Current Outpatient Medications   Medication Sig Dispense Refill     acetaminophen (TYLENOL) 325 MG tablet Take 650 mg by mouth every 4 hours as needed for mild pain as needed for pain/fever Max dose 3000mg/24hr       allopurinol (ZYLOPRIM) 300 MG tablet Take 300 mg by mouth daily       aspirin (ASA) 81 MG EC tablet Take 1 tablet (81 mg) by mouth daily       atorvastatin (LIPITOR) 10 MG tablet Take 10 mg by mouth daily       cyanocobalamin (VITAMIN B-12) 500 MCG SUBL sublingual tablet Place 1 tablet (500 mcg) under the tongue daily       Emollient (AMLACTIN ULTRA EX) Apply topically daily as needed TO FEET       ferrous sulfate (FEROSUL) 325 (65 Fe) MG tablet Take 325 mg by mouth daily       furosemide (LASIX) 40 MG tablet Take 1 tablet (40 mg) by mouth daily       ipratropium-albuterol (COMBIVENT RESPIMAT)  MCG/ACT inhaler Inhale 1 puff into the lungs 4 times daily 0800, 1200 ,1600 ,2000       metoprolol tartrate (LOPRESSOR) 25 MG  tablet Take 0.5 tablets (12.5 mg) by mouth 2 times daily       pantoprazole (PROTONIX) 40 MG EC tablet Take 1 tablet (40 mg) by mouth 2 times daily (before meals)       PARoxetine (PAXIL) 20 MG tablet Take 20 mg by mouth daily       polyethylene glycol (MIRALAX) 17 GM/Dose powder Take 17 g by mouth daily 510 g      Skin Protectants, Misc. (CALAZIME SKIN PROTECTANT) PSTE Externally apply topically 2 times daily Apply to buttocks topically for Excoriation to buttocks/unstageable Apply thin layer to buttocks  Also taking Apply to buttocks topically as needed for excoriation to butocks unstageable with incontinence episode         SERVICES:  Home Care:  Occupational Therapy, Physical Therapy, Registered Nurse, Home Health Aide and From:  [facility]    ADDITIONAL INSTRUCTIONS:  ? Weigh yourself daily in the morning and keep a record. Call your primary clinic: a) if you are more short of breath, or b) if your weight changes more than 3 pounds in one day or more than 5 pounds in one week.   ? Notify PCP if you have a fever greater than 100.5 degrees.   ? Oxygen at 2 liters/minute via nasal cannula.   ? Cardona catheter: size 16 French; last changed 7/4/21; change every 4 weeks and as needed.   ? Continue regular diet with nectar thick liquids  ? 24-hour supervision is recommended for safety.         ELIZABETH Newsome CNP  This document was electronically signed on July 20, 2021

## 2021-07-22 ENCOUNTER — MEDICAL CORRESPONDENCE (OUTPATIENT)
Dept: HEALTH INFORMATION MANAGEMENT | Facility: CLINIC | Age: 81
End: 2021-07-22

## 2021-07-25 NOTE — ED PROVIDER NOTES
"  History   Chief Complaint:  Shortness of Breath        HPI   History is limited secondary to patient being a poor historian. Available history is provided by patient and facility chart records.     Ron Gilmore is a 80 year old male with history of septic shock secondary to urosepsis in March 2021, paroxysmal atrial fibrillation, COPD, and CVA who presents with shortness of breath. Per chart review, the patient was admitted to the hospital from 5/1-5/4 due to shortness of breath and vomiting and tested positive for Covid-19 on 5/1. He was treated with dexamethasone and remdesevir. In addition, he was noted to have a UTI and was started on ciprofloxacin upon discharge. Today, a Nurse Practicioner at the [facility] stated that he was \"declining rapidly,\" experiencing increased shortness of breath, cough, and pharyngitis. She reports that he required 3 L of oxygen to keep sats between 90-91%. In addition, she reports that an x-ray obtained today showed infiltrates and that there was some concern for potential aspiration, prompting her to alert EMS. She states that he was also started on Levaquin and given his first dose today. Currently, the patient denies any chest pain, fever, chills, headache, nausea, vomiting, or any other symptoms.    Review of Systems   Constitutional: Negative for chills and fever.   HENT: Positive for sore throat.    Respiratory: Positive for cough and shortness of breath.    Cardiovascular: Negative for chest pain.   Gastrointestinal: Negative for nausea and vomiting.   Neurological: Negative for headaches.   All other systems reviewed and are negative.    Allergies:  The patient has no known allergies.     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  Muscle weakness  Neuropathy, diabetic  Obesity  Spinal stenosis  Stroke  Unsteady gait  UTI  Major depressive disorder  SIRS  Vasovagal episode  CAP  Closed tibia fracture  CVA  Left-sided weakness  Morbid obesity  Obstructive right sided ureteral stone resulting in hydronephrosis  Metabolic encephalopathy  Lactic acidosis  Severe sepsis     Past Surgical History:    Tonsillectomy  Laser holmium lithotripsy ureters, insert stent, combined  Laminectomy lumbar one level  Knee surgery, bilateral  Hip replacement, right  Ureteral stent placement, right   Nephrostomy tube placement, right  Right eye lid surgery  Loop recorder implant  Cystoscopy, transurethral resection prostate, combined  Cystoscopy  Colonoscopy  Cholecystectomy  Cataracts  Arthroscopy shoulder rotator cuff repair  Appendectomy    Family History:    Prostate cancer - father    Social History:  The patient presents via EMS and lives with his daughter.    Physical Exam     Patient Vitals for the past 24 hrs:   BP Temp Temp src Pulse Resp SpO2   05/11/21 0030 134/76 -- -- 88 -- 96 %   05/11/21 0015 124/80 -- -- 90 -- 96 %   05/11/21 0012 -- -- -- -- -- 97 %   05/11/21 0000 (!) 131/104 -- -- 91 -- 95 %   05/10/21 2130 -- -- -- -- -- 95 %   05/10/21 2125 -- -- -- -- -- 97 %   05/10/21 2117 -- -- -- -- -- (!) 88 %   05/10/21 2116 -- -- -- -- -- (!) 89 %   05/10/21 2109 -- -- -- -- -- 92 %   05/10/21 2108 -- -- -- -- -- 97 %   05/10/21 2100 -- -- -- -- -- 95 %   05/10/21 2046 104/72 97.9  F (36.6  C) Oral 86 20 97 %       Physical Exam  General: Alert and interactive. Appears chronically ill. On oxygen via NC.  Eyes: The pupils are equal and round. EOMs intact. No scleral icterus.  ENT: No abnormalities to the external nose or ears. Mucous membranes moist. Posterior oropharynx is non-erythematous.      Neck: Trachea is in the midline. No nuchal rigidity.     CV: Regular rate and rhythm. S1 and S2 normal without murmur,  click, gallop or rub.   Resp: Crackles in bilateral lung bases. Mild tachypnea. Non-labored, no retractions or accessory muscle use.     GI: Abdomen is soft without distension. No tenderness to palpation. No peritoneal signs.    MS: Moving all extremities well. Good muscle tone.   Skin: Warm and dry. No rash or lesions noted.  Neuro: Alert and oriented x 3. No focal neurologic deficits. Chronic left sided weakness.   Psych: Awake. Alert.  Normal affect. Appropriate interactions.  Lymph: No anterior or posterior cervical lymphadenopathy noted.    Emergency Department Course   ECG  ECG taken at 2137, ECG read at 2147  Sinus rhythm. Right bundle branch block. Abnormal ECG.   No significant change as compared to prior, dated 4/14/21.  Rate 83 bpm. FL interval 190 ms. QRS duration 132 ms. QT/QTc 408/479 ms. P-R-T axes 12 -12 22.     Imaging:    CT Chest (PE) Abdomen Pelvis w Contrast   Final Result   IMPRESSION:   1.  Allowing for respiratory motion artifact, there is no definitive evidence for pulmonary embolism.      2.  Hazy ill-defined nodular alveolar infiltrates with interstitial infiltrates in both lungs greater in the lower lobe most compatible with pneumonia. No pleural fluid.      3.  Moderate to marked centrilobular emphysema.      4.  No significant urinary collecting system dilatation or calculi. Minimal periureteral edema on the right extending toward the right renal hilum which can be seen with infectious or inflammatory etiologies associated with the urinary collecting system.    Symmetric contrast enhancement to both kidneys without evidence for renal abscess.      5.  Remainder of findings as detailed above.      XR Chest Port 1 View   Final Result   IMPRESSION: Stable left-sided implanted device. Low lung volumes. New   patchy left basilar and right upper lobe pulmonary opacities   suspicious for pneumonic infiltrates. No definite pleural effusion.   Stable heart size and central vascular prominence.  Suspected mild   pulmonary edema. Stable upper mediastinal soft tissue prominence   likely related to prominent upper mediastinal fat as seen on   comparison CT from 6/13/2017.      RUDY GALEANA MD          Laboratory:     CBC: WBC 8.3, HGB 11.3 (L),      CMP: Glucose 135 (H), Calcium 8.3 (L), Albumin 2.0 (L), o/w WNL (Creatinine 0.70)     Troponin (Collected 2110): <0.015    Ddimer: 1.4 (H)    Lactic acid (result time 2157): 1.7     Blood cultures: Pending  UA: in process    Emergency Department Course:    Reviewed:  I reviewed nursing notes, vitals, past medical history and care everywhere.    Assessments:  2100 I obtained history and examined the patient as noted above.     2254 I rechecked the patient and explained findings.     2351 I returned to recheck the patient.    Consults:   0151  I consulted with the hospitalist, Dr. Samson. They are in agreement to accept the patient for admission for further monitoring, evaluation, and treatment. I staffed patient with Dr. Pepper.     Interventions:  2330 Zosyn 4.5 g IV    Disposition:  The patient was admitted to the hospital.       Impression & Plan     Medical Decision Making:  Ron Gilmore is a 80 year old male with a recent history of urosepsis secondary to obstructive uropathy, CVA, type 2 diabetes mellitus, COVID pneumonia, who presents for evaluation of hypoxia, increased cough, and increased work of breathing. The patient is hypoxic in the upper 80s on room air, which is not his baseline at the nursing facility where he lives.  X-ray was obtained at the outside facility that showed bilateral pneumonia, and staff at the nursing home were concerned for aspiration pneumonia given that the patient has difficulty swallowing and is on nectar thick liquids.     Fortunately, the patient's laboratory work-up here has been reassuring. There is no leukocytosis, significant anemia, renal dysfunction, or electrolyte abnormalities. His lactic acid is normal  at 1.7. Blood cultures are pending at this time. His D-dimer is elevated at 1.4 but subsequent CT PE study shows no signs of pulmonary embolism. Troponin negative and ECG shows no new ischemic changes. CT does reveal bilateral infiltrates, suggestive of pneumonia. The patient is about to finish his quarantine, but given his ongoing symptoms, will continue special precautions in the emergency department.     Patient is with a history of uropathy from ureteral stone. CT shows periureteral edema and right renal hilum, possibly suggestive of infectious or inflammatory cause. No renal abscess. No new kidney stone. Cardona exchange, but UA still pending here.     The patient was initiated on oxygen and was given Zosyn to cover aspiration pneumonia and urinary tract infection simultaneously.  Blood cultures and urine cultures are pending at this time.  Discussed the patient with Dr. Samson, who will admit to an inpatient medical bed for further evaluation and treatment.  The patient is vitally stable here on oxygen.  His questions were answered.    Covid-19  Ron Gilmore was evaluated during a global COVID-19 pandemic, which necessitated consideration that the patient might be at risk for infection with the SARS-CoV-2 virus that causes COVID-19.   Applicable protocols for evaluation were followed during the patient's care.   COVID-19 was considered as part of the patient's evaluation. The plan for testing is:  a test was obtained at a previous visit and reviewed & considered today.    Diagnosis:    ICD-10-CM    1. Acute respiratory failure with hypoxia (H)  J96.01 Blood gas venous     UA reflex to Microscopic and Culture     Blood culture     D dimer quantitative   2. Pneumonia of both lungs due to infectious organism, unspecified part of lung  J18.9        Scribe Disclosure:  Adonis LAUREN, am serving as a scribe at 8:47 PM on 5/10/2021 to document services personally performed by Marine Morrell PA-C based on  my observations and the provider's statements to me.          Marine Morrell PA-C  05/11/21 0228     I have reviewed and confirmed nurses' notes...

## 2021-09-01 ENCOUNTER — HOSPITAL ENCOUNTER (EMERGENCY)
Facility: CLINIC | Age: 81
Discharge: HOME | End: 2021-09-01
Attending: EMERGENCY MEDICINE | Admitting: EMERGENCY MEDICINE
Payer: COMMERCIAL

## 2021-09-01 VITALS
SYSTOLIC BLOOD PRESSURE: 122 MMHG | RESPIRATION RATE: 20 BRPM | DIASTOLIC BLOOD PRESSURE: 76 MMHG | OXYGEN SATURATION: 95 % | TEMPERATURE: 98.6 F | HEART RATE: 81 BPM

## 2021-09-01 DIAGNOSIS — R34 DECREASED URINE OUTPUT: ICD-10-CM

## 2021-09-01 LAB
ALBUMIN UR-MCNC: 20 MG/DL
ANION GAP SERPL CALCULATED.3IONS-SCNC: 2 MMOL/L (ref 3–14)
APPEARANCE UR: CLEAR
BASOPHILS # BLD AUTO: 0 10E3/UL (ref 0–0.2)
BASOPHILS NFR BLD AUTO: 0 %
BILIRUB UR QL STRIP: NEGATIVE
BUN SERPL-MCNC: 19 MG/DL (ref 7–30)
CALCIUM SERPL-MCNC: 9.2 MG/DL (ref 8.5–10.1)
CHLORIDE BLD-SCNC: 101 MMOL/L (ref 94–109)
CO2 SERPL-SCNC: 34 MMOL/L (ref 20–32)
COLOR UR AUTO: ABNORMAL
CREAT SERPL-MCNC: 0.85 MG/DL (ref 0.66–1.25)
EOSINOPHIL # BLD AUTO: 0.4 10E3/UL (ref 0–0.7)
EOSINOPHIL NFR BLD AUTO: 5 %
ERYTHROCYTE [DISTWIDTH] IN BLOOD BY AUTOMATED COUNT: 16.3 % (ref 10–15)
GFR SERPL CREATININE-BSD FRML MDRD: 83 ML/MIN/1.73M2
GLUCOSE BLD-MCNC: 128 MG/DL (ref 70–99)
GLUCOSE UR STRIP-MCNC: NEGATIVE MG/DL
HCT VFR BLD AUTO: 37 % (ref 40–53)
HGB BLD-MCNC: 11.4 G/DL (ref 13.3–17.7)
HGB UR QL STRIP: ABNORMAL
HOLD SPECIMEN: NORMAL
HOLD SPECIMEN: NORMAL
HYALINE CASTS: 3 /LPF
IMM GRANULOCYTES # BLD: 0.1 10E3/UL
IMM GRANULOCYTES NFR BLD: 1 %
KETONES UR STRIP-MCNC: NEGATIVE MG/DL
LEUKOCYTE ESTERASE UR QL STRIP: ABNORMAL
LYMPHOCYTES # BLD AUTO: 1.5 10E3/UL (ref 0.8–5.3)
LYMPHOCYTES NFR BLD AUTO: 21 %
MCH RBC QN AUTO: 30.1 PG (ref 26.5–33)
MCHC RBC AUTO-ENTMCNC: 30.8 G/DL (ref 31.5–36.5)
MCV RBC AUTO: 98 FL (ref 78–100)
MONOCYTES # BLD AUTO: 0.8 10E3/UL (ref 0–1.3)
MONOCYTES NFR BLD AUTO: 11 %
MUCOUS THREADS #/AREA URNS LPF: PRESENT /LPF
NEUTROPHILS # BLD AUTO: 4.6 10E3/UL (ref 1.6–8.3)
NEUTROPHILS NFR BLD AUTO: 62 %
NITRATE UR QL: NEGATIVE
NRBC # BLD AUTO: 0 10E3/UL
NRBC BLD AUTO-RTO: 0 /100
PH UR STRIP: 6 [PH] (ref 5–7)
PLATELET # BLD AUTO: 215 10E3/UL (ref 150–450)
POTASSIUM BLD-SCNC: 3.6 MMOL/L (ref 3.4–5.3)
RBC # BLD AUTO: 3.79 10E6/UL (ref 4.4–5.9)
RBC URINE: 27 /HPF
SODIUM SERPL-SCNC: 137 MMOL/L (ref 133–144)
SP GR UR STRIP: 1.02 (ref 1–1.03)
UROBILINOGEN UR STRIP-MCNC: NORMAL MG/DL
WBC # BLD AUTO: 7.4 10E3/UL (ref 4–11)
WBC CLUMPS #/AREA URNS HPF: PRESENT /HPF
WBC URINE: 41 /HPF
YEAST #/AREA URNS HPF: ABNORMAL /HPF

## 2021-09-01 PROCEDURE — 36415 COLL VENOUS BLD VENIPUNCTURE: CPT | Performed by: EMERGENCY MEDICINE

## 2021-09-01 PROCEDURE — 96360 HYDRATION IV INFUSION INIT: CPT

## 2021-09-01 PROCEDURE — 99283 EMERGENCY DEPT VISIT LOW MDM: CPT | Mod: 25

## 2021-09-01 PROCEDURE — 87086 URINE CULTURE/COLONY COUNT: CPT | Performed by: EMERGENCY MEDICINE

## 2021-09-01 PROCEDURE — 81003 URINALYSIS AUTO W/O SCOPE: CPT | Performed by: EMERGENCY MEDICINE

## 2021-09-01 PROCEDURE — 85025 COMPLETE CBC W/AUTO DIFF WBC: CPT | Performed by: EMERGENCY MEDICINE

## 2021-09-01 PROCEDURE — 258N000003 HC RX IP 258 OP 636: Performed by: EMERGENCY MEDICINE

## 2021-09-01 PROCEDURE — 80048 BASIC METABOLIC PNL TOTAL CA: CPT | Performed by: EMERGENCY MEDICINE

## 2021-09-01 RX ADMIN — SODIUM CHLORIDE 500 ML: 9 INJECTION, SOLUTION INTRAVENOUS at 12:50

## 2021-09-01 ASSESSMENT — ENCOUNTER SYMPTOMS
FEVER: 0
CONSTIPATION: 1

## 2021-09-01 NOTE — ED NOTES
Bed: ED15  Expected date:   Expected time:   Means of arrival:   Comments:  KILEY - urinary retention constipation eta 1226

## 2021-09-01 NOTE — ED TRIAGE NOTES
Pt from [facility] - he had his chirinos changed out this am - there was sediment noted - not much for urinary output- the staff flushed with 120 ml and 200 was returned . Pt also complains of constipation - took miralx today . Uses oxygen 2lnc

## 2021-09-01 NOTE — DISCHARGE INSTRUCTIONS
Return for worsening constipation, vomiting, ongoing decreased urine output concerns or new issues.

## 2021-09-01 NOTE — ED NOTES
Writer called pt's facility and gave report to Shireen BLACK and informed her pt is being sent back to his facility

## 2021-09-01 NOTE — ED PROVIDER NOTES
History     Chief Complaint:  Urinary Retention and Constipation      The history is provided by the patient.      Ron Gilmore is a 80 year old male who presents with Urinary Retention and Constipation. The staff at his living facility was changing his catheter this AM and noticed that he had decreased urine output. He is constipated with last bowel movement occurring Monday. He notes abdominal discomfort due to the constipation, but is not in pain. He has been using Miralax for the his constipation, which he first took yesterday. There is no association of fever or vomiting.     Review of Systems   Constitutional: Negative for fever.   Gastrointestinal: Positive for constipation.   Genitourinary: Positive for decreased urine volume.   All other systems reviewed and are negative.      Allergies:  No Known Allergies    Medications:   Allopurinol   Atorvastatin   Ferosul   Lasix   Combivent   Lopressor   Protonix   Paxil   Miralax     Past Medical History:    BPH (benign prostatic hyperplasia)   Cataract   Cholelithiasis   COPD (chronic obstructive pulmonary disease) (H)   Depression   Diabetes mellitus   Dyshidrotic foot dermatitis   Edema   Gout   Hyperlipidemia   Hypertension   Kidney stones   Lumbago   Lumbar disc displacement without myelopathy   Muscle weakness   Neuropathy, diabetic (H)   Obesity   Spinal stenosis   Stroke (H)   Unsteady gait   Urinary retention with incomplete bladder emptying   UTI (urinary tract infection)   Hemiparesis   Dysphagia   Chronic respiratory failure   Pneumonia   Lactic acidosis   Severe sepsis   Uretal stone   Vasovagal episode   SIRS   Depressive disorder   CVA   Type 2 diabetes mellitus   Metabolic encephalopathy     Past Surgical History:    Appendectomy  Cholecystectomy   TURP  EP loop recorder implant   Right eye lid surgery   IVC filter placement   Nephrostomy tube placement   Uretal sent placement   Hip arthroplasty   Knee surgery   Lumbar laminectomy     Tonsillectomy     Family History:    Father - prostate cancer     Social History:  Patient was unaccompanied to the ED.  Patient lives in an Assisted Living facility   Uses an electric scooter to ambulate     Physical Exam     Patient Vitals for the past 24 hrs:   BP Temp Temp src Pulse Resp SpO2   09/01/21 1336 133/80 -- -- 86 20 96 %   09/01/21 1224 135/82 98.6  F (37  C) Oral 92 20 95 %       Physical Exam  VS: Reviewed per above  HENT: Mucous membranes moist  EYES: sclera anicteric  CV: Rate as noted, regular rhythm.   RESP: Effort normal. Breath sounds are normal bilaterally.  GI: no tenderness/rebound/guarding, not distended.  : chirinos catheter in place  NEURO: Alert, left-sided hemiparesis  MSK: No deformity of the extremities  SKIN: Warm and dry    Emergency Department Course   Laboratory:  UA with microscopic: Blood small, Leukocyte Esterase large, WBC Clumps present, Budding Yeast few, Mucus present, RBC 27 (H), WB 41 (H), Hylaline Casts 3 (H) o/w WNL     BMP: Carbon dioxide 34 (H), Anion gap 2 (L), Glucose 128 (H) o/w WNL (Creatinine 0.85)     CBC: WBC 7.4, HGB 11.4 (L),      Emergency Department Course:    Reviewed:  I reviewed nursing notes, vitals, past history and care everywhere    Assessments:  1223 I obtained history and examined the patient as noted above.   1410 I rechecked the patient and explained findings. I discussed plans for discharge.     Interventions:  1250 NS 500ml IV    Disposition:  The patient was discharged to home.    Impression & Plan    Medical Decision Making:  Patient presents to the ER from living facility with concern for decreased urinary output.  On arrival vital signs are reassuring.  On exam he has a Chirinos catheter in place without urine in the Chirinos catheter bag.  On bedside ultrasound, urinary bladder is decompressed.  Patient was given small IV fluid bolus at which point he started to make urine in the Chirinos catheter bag.  This was sent for testing.  Urine  was difficult to interpret in setting of chronic indwelling Cardona catheter and thus antibiotics will withheld while awaiting urine culture.  Renal function is at baseline.  Encouraged patient to drink lots of fluids in order to continue with adequate urine output, although it is not clear how long staff waited after changing his catheter this morning before determining that he was not making adequate urine.  With respect to constipation, it has been 2 days and he did start laxatives yesterday.  No vomiting or significant pain or distention to suggest bowel obstruction.  Encouraged ongoing laxative use and close primary care follow.  Return precautions discussed prior to discharge.      Covid-19  Ron Gilmore was evaluated during a global COVID-19 pandemic, which necessitated consideration that the patient might be at risk for infection with the SARS-CoV-2 virus that causes COVID-19.   Applicable protocols for evaluation were followed during the patient's care.       Diagnosis:    ICD-10-CM    1. Decreased urine output  R34        Discharge Medications:  New Prescriptions    No medications on file         Scribe Disclosure:  I, Hernando Lucero, am serving as a scribe at 12:23 PM on 9/1/2021 to document services personally performed by Cameron Klein MD based on my observations and the provider's statements to me.      Cameron Klein MD  09/01/21 1285

## 2021-09-02 LAB — BACTERIA UR CULT: NORMAL

## 2021-09-03 NOTE — RESULT ENCOUNTER NOTE
Final urine culture report is negative.  Adult Negative Urine culture parameters per protocol: Any # Urogenital single or mixed organism, <10,000 col/ml single organism (cath specimen), and <50,000 col/ml single organism (midstream).  [facility] Emergency Dept discharge antibiotic prescribed (If applicable): None  Treatment recommendations per [facility] ED Lab Result Urine Culture protocol.

## 2021-09-15 ENCOUNTER — TELEPHONE (OUTPATIENT)
Dept: CARDIOLOGY | Facility: CLINIC | Age: 81
End: 2021-09-15

## 2021-09-15 NOTE — TELEPHONE ENCOUNTER
Received a phone call from Shireen at [facility] ([phone]) inquiring about patient's next loop recorder check with the monitor he has in his room.     Called Shireen back and left a message informing her that patient is overdue for a loop recorder check and that he can send tomorrow to get a full check done. Patient was added to remote schedule and device clinic phone number was provided for call back with questions and concerns.

## 2021-09-24 ENCOUNTER — ANCILLARY PROCEDURE (OUTPATIENT)
Dept: CARDIOLOGY | Facility: CLINIC | Age: 81
End: 2021-09-24
Attending: INTERNAL MEDICINE
Payer: COMMERCIAL

## 2021-09-24 DIAGNOSIS — Z45.09 ENCOUNTER FOR LOOP RECORDER CHECK: ICD-10-CM

## 2021-09-24 DIAGNOSIS — I63.9 CEREBROVASCULAR ACCIDENT (CVA), UNSPECIFIED MECHANISM (H): ICD-10-CM

## 2021-09-24 PROCEDURE — 93297 REM INTERROG DEV EVAL ICPMS: CPT | Performed by: INTERNAL MEDICINE

## 2021-09-24 PROCEDURE — G2066 INTER DEVC REMOTE 30D: HCPCS | Performed by: INTERNAL MEDICINE

## 2021-10-04 LAB
MDC_IDC_EPISODE_DTM: NORMAL
MDC_IDC_EPISODE_DURATION: 17 S
MDC_IDC_EPISODE_ID: 17
MDC_IDC_EPISODE_TYPE: NORMAL
MDC_IDC_MSMT_BATTERY_STATUS: NORMAL
MDC_IDC_PG_IMPLANT_DTM: NORMAL
MDC_IDC_PG_MFG: NORMAL
MDC_IDC_PG_MODEL: NORMAL
MDC_IDC_PG_SERIAL: NORMAL
MDC_IDC_PG_TYPE: NORMAL
MDC_IDC_SESS_CLINIC_NAME: NORMAL
MDC_IDC_SESS_DTM: NORMAL
MDC_IDC_SESS_TYPE: NORMAL
MDC_IDC_SET_ZONE_DETECTION_INTERVAL: 2000 MS
MDC_IDC_SET_ZONE_DETECTION_INTERVAL: 3000 MS
MDC_IDC_SET_ZONE_DETECTION_INTERVAL: 390 MS
MDC_IDC_SET_ZONE_TYPE: NORMAL
MDC_IDC_STAT_AT_BURDEN_PERCENT: 0 %
MDC_IDC_STAT_AT_DTM_END: NORMAL
MDC_IDC_STAT_AT_DTM_START: NORMAL
MDC_IDC_STAT_EPISODE_RECENT_COUNT: 0
MDC_IDC_STAT_EPISODE_RECENT_COUNT: 1
MDC_IDC_STAT_EPISODE_RECENT_COUNT_DTM_END: NORMAL
MDC_IDC_STAT_EPISODE_RECENT_COUNT_DTM_START: NORMAL
MDC_IDC_STAT_EPISODE_TOTAL_COUNT: 0
MDC_IDC_STAT_EPISODE_TOTAL_COUNT: 1
MDC_IDC_STAT_EPISODE_TOTAL_COUNT: 16
MDC_IDC_STAT_EPISODE_TOTAL_COUNT_DTM_END: NORMAL
MDC_IDC_STAT_EPISODE_TOTAL_COUNT_DTM_START: NORMAL
MDC_IDC_STAT_EPISODE_TYPE: NORMAL

## 2021-10-20 ENCOUNTER — TELEPHONE (OUTPATIENT)
Dept: OTHER | Facility: CLINIC | Age: 81
End: 2021-10-20
Payer: MEDICARE

## 2021-10-20 NOTE — TELEPHONE ENCOUNTER
AMBER with wife to schedule Ron with either Ilda Goodrich or Dr. Jurado at next available.     Appt Note:  AC management and IVC filter       Zully DIOP

## 2021-11-08 ENCOUNTER — HOSPITAL ENCOUNTER (OUTPATIENT)
Facility: CLINIC | Age: 81
Setting detail: OBSERVATION
LOS: 1 days | Discharge: NURSING, REHAB OR LONG-TERM CARE | End: 2021-11-10
Attending: EMERGENCY MEDICINE | Admitting: EMERGENCY MEDICINE
Payer: COMMERCIAL

## 2021-11-08 ENCOUNTER — APPOINTMENT (OUTPATIENT)
Dept: GENERAL RADIOLOGY | Facility: CLINIC | Age: 81
End: 2021-11-08
Attending: EMERGENCY MEDICINE
Payer: COMMERCIAL

## 2021-11-08 DIAGNOSIS — N39.0 URINARY TRACT INFECTION ASSOCIATED WITH INDWELLING URETHRAL CATHETER, INITIAL ENCOUNTER (H): ICD-10-CM

## 2021-11-08 DIAGNOSIS — R31.9 URINARY TRACT INFECTION WITH HEMATURIA, SITE UNSPECIFIED: Primary | ICD-10-CM

## 2021-11-08 DIAGNOSIS — K92.2 GASTROINTESTINAL HEMORRHAGE, UNSPECIFIED GASTROINTESTINAL HEMORRHAGE TYPE: ICD-10-CM

## 2021-11-08 DIAGNOSIS — J44.9 CHRONIC OBSTRUCTIVE PULMONARY DISEASE, UNSPECIFIED COPD TYPE (H): ICD-10-CM

## 2021-11-08 DIAGNOSIS — A41.9 SEPSIS, DUE TO UNSPECIFIED ORGANISM, UNSPECIFIED WHETHER ACUTE ORGAN DYSFUNCTION PRESENT (H): ICD-10-CM

## 2021-11-08 DIAGNOSIS — N39.0 URINARY TRACT INFECTION WITH HEMATURIA, SITE UNSPECIFIED: Primary | ICD-10-CM

## 2021-11-08 DIAGNOSIS — T83.511A URINARY TRACT INFECTION ASSOCIATED WITH INDWELLING URETHRAL CATHETER, INITIAL ENCOUNTER (H): ICD-10-CM

## 2021-11-08 LAB
ALBUMIN SERPL-MCNC: 3.1 G/DL (ref 3.4–5)
ALBUMIN UR-MCNC: 100 MG/DL
ALP SERPL-CCNC: 65 U/L (ref 40–150)
ALT SERPL W P-5'-P-CCNC: 16 U/L (ref 0–70)
AMORPH CRY #/AREA URNS HPF: ABNORMAL /HPF
ANION GAP SERPL CALCULATED.3IONS-SCNC: 4 MMOL/L (ref 3–14)
APPEARANCE UR: ABNORMAL
AST SERPL W P-5'-P-CCNC: 11 U/L (ref 0–45)
BACTERIA #/AREA URNS HPF: ABNORMAL /HPF
BASOPHILS # BLD AUTO: 0 10E3/UL (ref 0–0.2)
BASOPHILS NFR BLD AUTO: 0 %
BILIRUB SERPL-MCNC: 0.5 MG/DL (ref 0.2–1.3)
BILIRUB UR QL STRIP: NEGATIVE
BUN SERPL-MCNC: 21 MG/DL (ref 7–30)
CALCIUM SERPL-MCNC: 9.5 MG/DL (ref 8.5–10.1)
CAOX CRY #/AREA URNS HPF: ABNORMAL /HPF
CHLORIDE BLD-SCNC: 103 MMOL/L (ref 94–109)
CO2 SERPL-SCNC: 32 MMOL/L (ref 20–32)
COLOR UR AUTO: ABNORMAL
CREAT SERPL-MCNC: 0.89 MG/DL (ref 0.66–1.25)
EOSINOPHIL # BLD AUTO: 0.2 10E3/UL (ref 0–0.7)
EOSINOPHIL NFR BLD AUTO: 1 %
ERYTHROCYTE [DISTWIDTH] IN BLOOD BY AUTOMATED COUNT: 17 % (ref 10–15)
FLUAV RNA SPEC QL NAA+PROBE: NEGATIVE
FLUBV RNA RESP QL NAA+PROBE: NEGATIVE
GFR SERPL CREATININE-BSD FRML MDRD: 81 ML/MIN/1.73M2
GLUCOSE BLD-MCNC: 175 MG/DL (ref 70–99)
GLUCOSE UR STRIP-MCNC: NEGATIVE MG/DL
HCO3 BLDV-SCNC: 33 MMOL/L (ref 21–28)
HCO3 BLDV-SCNC: 36 MMOL/L (ref 21–28)
HCT VFR BLD AUTO: 37 % (ref 40–53)
HGB BLD-MCNC: 11.3 G/DL (ref 13.3–17.7)
HGB UR QL STRIP: ABNORMAL
HYALINE CASTS: 6 /LPF
IMM GRANULOCYTES # BLD: 0.1 10E3/UL
IMM GRANULOCYTES NFR BLD: 1 %
INR PPP: 1.13 (ref 0.85–1.15)
KETONES UR STRIP-MCNC: NEGATIVE MG/DL
LACTATE BLD-SCNC: 1.7 MMOL/L
LACTATE BLD-SCNC: 2.6 MMOL/L
LEUKOCYTE ESTERASE UR QL STRIP: ABNORMAL
LYMPHOCYTES # BLD AUTO: 1 10E3/UL (ref 0.8–5.3)
LYMPHOCYTES NFR BLD AUTO: 9 %
MCH RBC QN AUTO: 30.7 PG (ref 26.5–33)
MCHC RBC AUTO-ENTMCNC: 30.5 G/DL (ref 31.5–36.5)
MCV RBC AUTO: 101 FL (ref 78–100)
MONOCYTES # BLD AUTO: 0.8 10E3/UL (ref 0–1.3)
MONOCYTES NFR BLD AUTO: 7 %
NEUTROPHILS # BLD AUTO: 9.4 10E3/UL (ref 1.6–8.3)
NEUTROPHILS NFR BLD AUTO: 82 %
NITRATE UR QL: POSITIVE
NRBC # BLD AUTO: 0 10E3/UL
NRBC BLD AUTO-RTO: 0 /100
PCO2 BLDV: 51 MM HG (ref 40–50)
PCO2 BLDV: 52 MM HG (ref 40–50)
PH BLDV: 7.42 [PH] (ref 7.32–7.43)
PH BLDV: 7.44 [PH] (ref 7.32–7.43)
PH UR STRIP: 8 [PH] (ref 5–7)
PLATELET # BLD AUTO: 238 10E3/UL (ref 150–450)
PO2 BLDV: 36 MM HG (ref 25–47)
PO2 BLDV: 47 MM HG (ref 25–47)
POTASSIUM BLD-SCNC: 4 MMOL/L (ref 3.4–5.3)
PROT SERPL-MCNC: 8.1 G/DL (ref 6.8–8.8)
RBC # BLD AUTO: 3.68 10E6/UL (ref 4.4–5.9)
RBC URINE: >182 /HPF
SAO2 % BLDV: 71 % (ref 94–100)
SAO2 % BLDV: 82 % (ref 94–100)
SARS-COV-2 RNA RESP QL NAA+PROBE: NEGATIVE
SODIUM SERPL-SCNC: 139 MMOL/L (ref 133–144)
SP GR UR STRIP: 1.02 (ref 1–1.03)
SQUAMOUS EPITHELIAL: 1 /HPF
UROBILINOGEN UR STRIP-MCNC: NORMAL MG/DL
WBC # BLD AUTO: 11.4 10E3/UL (ref 4–11)
WBC CLUMPS #/AREA URNS HPF: PRESENT /HPF
WBC URINE: >182 /HPF

## 2021-11-08 PROCEDURE — 99285 EMERGENCY DEPT VISIT HI MDM: CPT | Mod: 25

## 2021-11-08 PROCEDURE — 99223 1ST HOSP IP/OBS HIGH 75: CPT | Mod: AI | Performed by: HOSPITALIST

## 2021-11-08 PROCEDURE — 71045 X-RAY EXAM CHEST 1 VIEW: CPT

## 2021-11-08 PROCEDURE — 83550 IRON BINDING TEST: CPT | Performed by: HOSPITALIST

## 2021-11-08 PROCEDURE — 86900 BLOOD TYPING SEROLOGIC ABO: CPT | Performed by: EMERGENCY MEDICINE

## 2021-11-08 PROCEDURE — 96367 TX/PROPH/DG ADDL SEQ IV INF: CPT

## 2021-11-08 PROCEDURE — 80053 COMPREHEN METABOLIC PANEL: CPT | Performed by: EMERGENCY MEDICINE

## 2021-11-08 PROCEDURE — 82803 BLOOD GASES ANY COMBINATION: CPT

## 2021-11-08 PROCEDURE — 258N000003 HC RX IP 258 OP 636: Performed by: EMERGENCY MEDICINE

## 2021-11-08 PROCEDURE — 36415 COLL VENOUS BLD VENIPUNCTURE: CPT | Performed by: EMERGENCY MEDICINE

## 2021-11-08 PROCEDURE — C9803 HOPD COVID-19 SPEC COLLECT: HCPCS

## 2021-11-08 PROCEDURE — 87636 SARSCOV2 & INF A&B AMP PRB: CPT | Performed by: EMERGENCY MEDICINE

## 2021-11-08 PROCEDURE — 96375 TX/PRO/DX INJ NEW DRUG ADDON: CPT

## 2021-11-08 PROCEDURE — 96365 THER/PROPH/DIAG IV INF INIT: CPT

## 2021-11-08 PROCEDURE — 87086 URINE CULTURE/COLONY COUNT: CPT | Performed by: EMERGENCY MEDICINE

## 2021-11-08 PROCEDURE — 250N000011 HC RX IP 250 OP 636: Performed by: EMERGENCY MEDICINE

## 2021-11-08 PROCEDURE — 85610 PROTHROMBIN TIME: CPT | Performed by: EMERGENCY MEDICINE

## 2021-11-08 PROCEDURE — C9113 INJ PANTOPRAZOLE SODIUM, VIA: HCPCS | Performed by: EMERGENCY MEDICINE

## 2021-11-08 PROCEDURE — 85025 COMPLETE CBC W/AUTO DIFF WBC: CPT | Performed by: EMERGENCY MEDICINE

## 2021-11-08 PROCEDURE — 96361 HYDRATE IV INFUSION ADD-ON: CPT

## 2021-11-08 PROCEDURE — 96366 THER/PROPH/DIAG IV INF ADDON: CPT

## 2021-11-08 PROCEDURE — 81001 URINALYSIS AUTO W/SCOPE: CPT | Performed by: EMERGENCY MEDICINE

## 2021-11-08 RX ORDER — CEFTRIAXONE 1 G/1
1 INJECTION, POWDER, FOR SOLUTION INTRAMUSCULAR; INTRAVENOUS ONCE
Status: COMPLETED | OUTPATIENT
Start: 2021-11-08 | End: 2021-11-08

## 2021-11-08 RX ORDER — PAROXETINE 30 MG/1
30 TABLET, FILM COATED ORAL EVERY MORNING
Status: ON HOLD | COMMUNITY
End: 2022-05-01

## 2021-11-08 RX ADMIN — CEFTRIAXONE SODIUM 1 G: 1 INJECTION, POWDER, FOR SOLUTION INTRAMUSCULAR; INTRAVENOUS at 14:01

## 2021-11-08 RX ADMIN — SODIUM CHLORIDE 500 ML: 9 INJECTION, SOLUTION INTRAVENOUS at 13:54

## 2021-11-08 RX ADMIN — SODIUM CHLORIDE 500 ML: 9 INJECTION, SOLUTION INTRAVENOUS at 13:13

## 2021-11-08 RX ADMIN — PANTOPRAZOLE SODIUM 80 MG: 40 INJECTION, POWDER, FOR SOLUTION INTRAVENOUS at 13:50

## 2021-11-08 ASSESSMENT — ACTIVITIES OF DAILY LIVING (ADL)
ADLS_ACUITY_SCORE: 9

## 2021-11-08 NOTE — ED NOTES
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  He live at [facility].  Nursing was changing the pt chirinos and noted blood in the urine.  Pt states he had dark stool.  It appears grey on arrival to ED.    Focused Assessment:  Pt arrived via ambulance to ED A and O x 4. Pt states he does not feel well.  Arrives without a chirinos.  New catheter placed on arrival with 300cc output of thick, cloudy orange urine. Pt VSS, tachy in the low 100's Sepsis protocol started.  Pt lungs coarse on the left.      Treatments and/or interventions provided:   Results for orders placed or performed during the hospital encounter of 11/08/21   XR Chest Port 1 View     Status: None    Narrative    XR PORTABLE CHEST ONE VIEW   11/8/2021 3:38 PM     HISTORY: Cough    COMPARISON: 5/29/2021.      Impression    IMPRESSION: No acute cardiopulmonary disease.    TODD GALICIA MD         SYSTEM ID:  [id number]   UA with Microscopic reflex to Culture     Status: Abnormal    Specimen: Urine, Catheter   Result Value Ref Range    Color Urine Orange (A) Colorless, Straw, Light Yellow, Yellow    Appearance Urine Cloudy (A) Clear    Glucose Urine Negative Negative mg/dL    Bilirubin Urine Negative Negative    Ketones Urine Negative Negative mg/dL    Specific Gravity Urine 1.019 1.003 - 1.035    Blood Urine Moderate (A) Negative    pH Urine 8.0 (H) 5.0 - 7.0    Protein Albumin Urine 100  (A) Negative mg/dL     Urobilinogen Urine Normal Normal, 2.0 mg/dL    Nitrite Urine Positive (A) Negative    Leukocyte Esterase Urine Large (A) Negative    Bacteria Urine Moderate (A) None Seen /HPF    WBC Clumps Urine Present (A) None Seen /HPF    Amorphous Crystals Urine Few (A) None Seen /HPF    Calcium Oxalate Crystals Urine Moderate (A) None Seen /HPF    RBC Urine >182 (H) <=2 /HPF    WBC Urine >182 (H) <=5 /HPF    Squamous Epithelials Urine 1 <=1 /HPF    Hyaline Casts Urine 6 (H) <=2 /LPF    Narrative    Urine Culture ordered based on laboratory criteria   INR     Status: Normal   Result Value Ref Range    INR 1.13 0.85 - 1.15   Comprehensive metabolic panel     Status: Abnormal   Result Value Ref Range    Sodium 139 133 - 144 mmol/L    Potassium 4.0 3.4 - 5.3 mmol/L    Chloride 103 94 - 109 mmol/L    Carbon Dioxide (CO2) 32 20 - 32 mmol/L    Anion Gap 4 3 - 14 mmol/L    Urea Nitrogen 21 7 - 30 mg/dL    Creatinine 0.89 0.66 - 1.25 mg/dL    Calcium 9.5 8.5 - 10.1 mg/dL    Glucose 175 (H) 70 - 99 mg/dL    Alkaline Phosphatase 65 40 - 150 U/L    AST 11 0 - 45 U/L    ALT 16 0 - 70 U/L    Protein Total 8.1 6.8 - 8.8 g/dL    Albumin 3.1 (L) 3.4 - 5.0 g/dL    Bilirubin Total 0.5 0.2 - 1.3 mg/dL    GFR Estimate 81 >60 mL/min/1.73m2   CBC with platelets and differential     Status: Abnormal   Result Value Ref Range    WBC Count 11.4 (H) 4.0 - 11.0 10e3/uL    RBC Count 3.68 (L) 4.40 - 5.90 10e6/uL    Hemoglobin 11.3 (L) 13.3 - 17.7 g/dL    Hematocrit 37.0 (L) 40.0 - 53.0 %     (H) 78 - 100 fL    MCH 30.7 26.5 - 33.0 pg    MCHC 30.5 (L) 31.5 - 36.5 g/dL    RDW 17.0 (H) 10.0 - 15.0 %    Platelet Count 238 150 - 450 10e3/uL    % Neutrophils 82 %    % Lymphocytes 9 %    % Monocytes 7 %    % Eosinophils 1 %    % Basophils 0 %    % Immature Granulocytes 1 %    NRBCs per 100 WBC 0 <1 /100    Absolute Neutrophils 9.4 (H) 1.6 - 8.3 10e3/uL    Absolute Lymphocytes 1.0 0.8 - 5.3 10e3/uL    Absolute Monocytes 0.8 0.0 - 1.3 10e3/uL    Absolute  Eosinophils 0.2 0.0 - 0.7 10e3/uL    Absolute Basophils 0.0 0.0 - 0.2 10e3/uL    Absolute Immature Granulocytes 0.1 (H) <=0.0 10e3/uL    Absolute NRBCs 0.0 10e3/uL   iStat Gases (lactate) venous, POCT     Status: Abnormal   Result Value Ref Range    Lactic Acid POCT 2.6 (H) <=2.0 mmol/L    Bicarbonate Venous POCT 36 (H) 21 - 28 mmol/L    O2 Sat, Venous POCT 71 (L) 94 - 100 %    pCO2V Venous POCT 52 (H) 40 - 50 mm Hg    pH Venous POCT 7.44 (H) 7.32 - 7.43    pO2 Venous POCT 36 25 - 47 mm Hg   iStat Gases (lactate) venous, POCT     Status: Abnormal   Result Value Ref Range    Lactic Acid POCT 1.7 <=2.0 mmol/L    Bicarbonate Venous POCT 33 (H) 21 - 28 mmol/L    O2 Sat, Venous POCT 82 (L) 94 - 100 %    pCO2V Venous POCT 51 (H) 40 - 50 mm Hg    pH Venous POCT 7.42 7.32 - 7.43    pO2 Venous POCT 47 25 - 47 mm Hg   Symptomatic Influenza A/B & SARS-CoV2 (COVID-19) Virus PCR Multiplex Nasopharyngeal     Status: Normal    Specimen: Nasopharyngeal; Swab   Result Value Ref Range    Influenza A target Negative Negative    Influenza B target Negative Negative    SARS CoV2 PCR Negative Negative    Narrative    Testing was performed using the diaz SARS-CoV-2 & Influenza A/B Assay on the diaz Caren System. This test should be ordered for the detection of SARS-CoV-2 and influenza viruses in individuals who meet clinical and/or epidemiological criteria. Test performance is unknown in asymptomatic patients. This test is for in vitro diagnostic use under the FDA EUA for laboratories certified under CLIA to perform moderate and/or high complexity testing. This test has not been FDA cleared or approved. A negative result does not rule out the presence of PCR inhibitors in the specimen or target RNA in concentration below the limit of detection for the assay. If only one viral target is positive but coinfection with multiple targets is suspected, the sample should be re-tested with another FDA cleared, approved or authorized test, if  coinfection would change clinical management. [facility] are certified under the Clinical Laboratory Improvement Amendments of 1988 (CLIA-88) as  qualified to perform moderate and/or high complexity laboratory testing.   CBC with platelets differential     Status: Abnormal    Narrative    The following orders were created for panel order CBC with platelets differential.  Procedure                               Abnormality         Status                     ---------                               -----------         ------                     CBC with platelets and d...[[id number]]  Abnormal            Final result                 Please view results for these tests on the individual orders.   ABO/Rh type and screen     Status: None (In process)    Narrative    The following orders were created for panel order ABO/Rh type and screen.  Procedure                               Abnormality         Status                     ---------                               -----------         ------                     Adult Type and Screen[[id number]]                            In process                   Please view results for these tests on the individual orders.   Extra Tube     Status: None ()    Narrative    The following orders were created for panel order Extra Tube.  Procedure                               Abnormality         Status                     ---------                               -----------         ------                     Extra Red Top Tube[[id number]]                                                          Extra Green Top (Lithium...[[id number]]                                                   Please view results for these tests on the individual orders.       Patient's response to treatments and/or interventions: Lactic acid went from 2.6 to 1.7 after 1 litre NS.    To be done/followed up on inpatient unit:  Antibiotics    Does this patient have any cognitive concerns?: NA    Activity  level - Baseline/Home:  Total Care  Activity Level - Current:   Total Care    Patient's Preferred language: English   Needed?: No    Isolation: None  Infection: Not Applicable  Patient tested for COVID 19 prior to admission: YES  Bariatric?: Yes    Vital Signs:   Vitals:    11/08/21 1345 11/08/21 1400 11/08/21 1415 11/08/21 1500   BP: 133/84 104/69 105/74 114/70   Pulse: 98 96 93 93   Resp: 16 16 16 14   Temp:       TempSrc:       SpO2: 90% 90% 95% 96%   Height:           Cardiac Rhythm:     Was the PSS-3 completed:   Yes  What interventions are required if any?               Family Comments: Father aware pt is here  OBS brochure/video discussed/provided to patient/family: Yes              Name of person given brochure if not patient:  NA              Relationship to patient:     For the majority of the shift this patient's behavior was Green.   Behavioral interventions performed were NA.    ED NURSE PHONE NUMBER: [phone] Muna BLACK 3

## 2021-11-08 NOTE — ED TRIAGE NOTES
Live in [facility].  Abdominal pan past day.  RN went to change chirinos and noted blood in urine.  RN states today noted black stool

## 2021-11-08 NOTE — PHARMACY-ADMISSION MEDICATION HISTORY
Pharmacy Medication History  Admission medication history interview status for the 11/8/2021  admission is complete. See EPIC admission navigator for prior to admission medications     Location of Interview: Patient room  Medication history sources: Patient and MAR ([facility])    In the past week, patient estimated taking medication this percent of the time: greater than 90%    Medication reconciliation completed by provider prior to medication history? No    Time spent in this activity: 30 minutes    Prior to Admission medications    Medication Sig Last Dose Taking? Auth Provider   acetaminophen (TYLENOL) 325 MG tablet Take 650 mg by mouth every 4 hours as needed for mild pain as needed for pain/fever Max dose 3000mg/24hr 11/8/2021 at Unknown time Yes Reported, Patient   allopurinol (ZYLOPRIM) 300 MG tablet Take 300 mg by mouth daily 11/7/2021 at Unknown time Yes Unknown, Entered By History   aspirin (ASA) 81 MG EC tablet Take 1 tablet (81 mg) by mouth daily 11/7/2021 at Unknown time Yes Rg Tobin,    atorvastatin (LIPITOR) 10 MG tablet Take 10 mg by mouth daily 11/7/2021 at Unknown time Yes Unknown, Entered By History   cyanocobalamin (VITAMIN B-12) 500 MCG SUBL sublingual tablet Place 1 tablet (500 mcg) under the tongue daily 11/7/2021 at Unknown time Yes Barron Duque MD   Emollient (AMLACTIN ULTRA EX) Apply topically daily TO FEET 11/7/2021 at Unknown time Yes Reported, Patient   ferrous sulfate (FEROSUL) 325 (65 Fe) MG tablet Take 325 mg by mouth daily 11/7/2021 at Unknown time Yes Reported, Patient   furosemide (LASIX) 40 MG tablet Take 1 tablet (40 mg) by mouth daily 11/7/2021 at Unknown time Yes Barron Duque MD   ipratropium-albuterol (COMBIVENT RESPIMAT)  MCG/ACT inhaler Inhale 1 puff into the lungs 4 times daily 0800, 1200 ,1600 ,2000 11/7/2021 at Unknown time Yes Reported, Patient   metoprolol tartrate (LOPRESSOR) 25 MG tablet Take 0.5 tablets (12.5 mg)  by mouth 2 times daily 11/7/2021 at Unknown time Yes Romulo Cavanaugh MD   pantoprazole (PROTONIX) 40 MG EC tablet Take 1 tablet (40 mg) by mouth 2 times daily (before meals) 11/7/2021 at Unknown time Yes Rg Tobin,    PARoxetine (PAXIL) 30 MG tablet Take 30 mg by mouth every morning 11/7/2021 at Unknown time Yes Unknown, Entered By History   polyethylene glycol (MIRALAX) 17 GM/Dose powder Take 17 g by mouth daily  Patient taking differently: Take 17 g by mouth daily as needed  11/7/2021 at Unknown time Yes Romulo Cavanaugh MD   Skin Protectants, Misc. (CALAZIME SKIN PROTECTANT) PSTE Externally apply topically 2 times daily Apply to buttocks topically for Excoriation to buttocks/unstageable Apply thin layer to buttocks  Also taking Apply to buttocks topically as needed for excoriation to butocks unstageable with incontinence episode 11/7/2021 at Unknown time Yes Reported, Patient       The information provided in this note is only as accurate as the sources available at the time of update(s)

## 2021-11-08 NOTE — ED NOTES
Bed: ED19  Expected date:   Expected time:   Means of arrival:   Comments:  Saira - 81 M cath issues eta 1131

## 2021-11-08 NOTE — ED PROVIDER NOTES
Visit Date: 11/08/2021    CHIEF COMPLAINT:  Blood in the urine and black stool.    HISTORY OF PRESENT ILLNESS:  This is a 81-year-old male who presents from a nursing facility with the above complaints.  The patient had a stroke in the last year and has required an indwelling Cardona catheter.  The catheter was found to have blood in it and it was removed, but not replaced.  Black stool was also noted at that time and with this concern, the patient was sent to the ED for evaluation.  The patient himself initially presented with abdominal tenderness.  He had no nausea or vomiting, chest pain or shortness of breath, although he does have a chronic cough.  He had been admitted in May of this last year, at which time he had both EGD and colonoscopy.  He had respiratory failure, he had a possible PE and he had an IVC filter placed.  He was seen in the ED on 09/01/2021 for urinary retention and that is where he had a Cardona catheter placed.  He does take aspirin.    PAST MEDICAL HISTORY:  Also includes COPD, kidney stones, urinary tract infections, diabetes and prostatic hypertrophy.  He does have a history of community-acquired pneumonia, depression and has left sided weakness after a stroke.    MEDICATIONS:  As noted.  Also:  1.  Aspirin.  2.  Lasix.  3.  Lopressor.  4.  Protonix.  5.  Allopurinol.  6.  Lipitor.  7.  Iron.  8.  Paxil.    ALLERGIES:  NONE LISTED.    FAMILY AND SOCIAL HISTORY:  As noted above.  He is not currently smoking or drinking.    REVIEW OF SYSTEMS:  Also as noted above with all other systems negative.    PHYSICAL EXAMINATION:    GENERAL:  Reveals a heavyset white male, lying supine, in no respiratory distress, but an occasional cough.  VITAL SIGNS:  Blood pressure 150/99, temperature 98, pulse 114, respirations 22, pulse ox 92% on room air.  HEENT:  Pupils equal, reactive.  Oropharynx clear.  NECK:  Neck veins flat.  LUNGS:  Reveal some coarse rhonchi, left more than right.  HEART:  Regular.  No  murmurs appreciated.  ABDOMEN:  Soft.  No masses or hepatosplenomegaly.  1+ femoral pulses are noted.  GENITOURINARY:  Cardona catheter is now in place.  MUSCULOSKELETAL:  No swelling or tenderness.  NEUROLOGIC:  Awake, alert, appropriate.  Normal.  No facial asymmetry.  Severe weakness of the left arm and left leg.  SKIN:  No rash.    Rectal:  Dark gray/black stool present.    EMERGENCY DEPARTMENT COURSE:  IV was started.  The patient was placed on EKG, blood pressure, and oximetry monitoring.  EKG shows a sinus rhythm with right bundle branch block.  This is unchanged from previous.      LABORATORY DATA:  Initially his lactate was 2.6.  Urine had a large number of white and red cells with bacteria.  This was from his new catheter..  INR is normal.      Chemistries:  Glucose 175, otherwise normal.  White count 11.4, hemoglobin 11.3, platelet count is 238,000.  The patient received IV fluids.  He received Protonix.  His hemoglobin was compared to a previous hemoglobin on 2021.  At that point, it was 11.4, so relatively unchanged.      Cardona catheter was changed by nursing, as noted.  On chest x-ray, shows no acute cardiopulmonary disease.  A repeat lactate after IV fluids returned at 1.7.  It had normalized with IV fluids and Rocephin, which was used empirically for a possible UTI.      Given the patient's initial tachycardia, elevated lactate and possible urinary tract infection as well as possible GI bleed, although this cannot be certain, the patient will be brought in for further observation and treatment.    IMPRESSION:    1.  Sepsis.  2.  Urinary tract infection possible with indwelling catheter.  3.  Black stool, possible GI hemorrhage.  4.  Chronic obstructive pulmonary disease.    Phan Damon MD        D: 2021   T: 2021   MT: DORIS    Name:     SHERI THORPE  MRN:      [id number]        Account:    [id number]   :      1940           Visit Date: 2021     Document:  [id number]

## 2021-11-08 NOTE — H&P
[facility]    History and Physical  Hospitalist    Ron Gilmore MRN# [id number]   Age: 81 year old YOB: 1940     Date of Admission:  11/8/2021    Primary care provider: Augustin Thomas          Assessment and Plan:     Ron Gilmore is a 81 year old  male with medical history of CVA with residual left-sided weakness, COPD, chronic urinary retention, history of septic shock March 2021 secondary to E. coli bacteremia due to obstructive lumbosacral UV junction stone with hydronephrosis, status post right side percutaneous tube placement and removal and right side stent placement, hyperlipidemia, depression, diabetes mellitus, dysphagia sent into the ED for black-colored stools and blood in the urine.    Urinary tract infection, catheter associated.  Hematuria likely from urinary tract infection/traumatic.  Lactic acidosis with sepsis from UTI.  Chronic indwelling Cardona catheter.  History of obstructive right-sided ureteral stone with hydronephrosis status post percutaneous nephrostomy tube placement with subsequent removal.  Patient with chronic debility, had difficulty passing urine today and blood around the Cardona catheter, sent into ED.  In ED afebrile.  Tachycardic to 114.  WBC 11.4, lactic acid 2.6.  Hemoglobin at 11.4.  UA large number of white cells, red cells with bacteria.  Admit to inpatient.  Received IV fluids with which lactic acidosis improved.  Started on IV ceftriaxone, continue IV ceftriaxone at this time.  Follow urine culture results, procalcitonin levels.  Cardona catheter care, changed today in the ED.  Hold PTA aspirin tonight, monitor hemoglobin levels in AM.  Monitor for hematuria.    Melena, rule out GI bleed.  Chronic anemia, suspected upper GI bleed May 2021.  During his admission in May 2021 had EGD which was normal, colonoscopy showed diverticulosis.  Then received blood transfusion and iron infusion for anemia.  Now presenting with  dark-colored stools at assisted living facility.  Hemoglobin 11.4.  Start on IV Protonix twice daily.  Hold PTA oral Protonix.  Clear liquid diet.  N.p.o. from midnight.  Susan BAUMAN consulted.  Monitor hemoglobin levels tonight and in AM, earlier if symptomatic.  Follow TSH, iron studies.  Hold PTA aspirin 81 mg daily tonight.    Chronic hypoxic respiratory failure, oxygen dependent.  Chronic obstructive lung disease/emphysema  Possible obstructive sleep apnea.  In ED on home oxygen oxygen saturations greater than 90%.  Denies any chest pain or shortness of breath.   Chest x-ray no acute pathology.  Continue PTA inhaler therapy.  Continue PTA supplemental nasal oxygen.  Consider sleep studies as outpatient.     Paroxysmal atrial fibrillation not on anticoagulation   History of chronic heart failure with preserved EF.  Hypertension, hyperlipidemia.  Denies any new shortness of breath.  No lower extremity edema.  Tachycardic to 114.  EKG sinus rhythm with right bundle branch block.  Hold PTA aspirin 81 mg daily.  Continue PTA Lipitor, metoprolol.  Hold PTA oral Lasix today, received IV fluids as above for sepsis.  Telemetry monitoring overnight given tachycardia.    History of pulmonary embolism s/p IVC filter 5/2021  Right peroneal vein DVT  Holding PTA aspirin 81 mg tonight.    History of CVA with residual left-sided weakness.  Continue PTA statin.  Hold PTA aspirin.    Type 2 diabetes mellitus with last hemoglobin A1c of 6.  Monitor blood sugars periodically.     Depression   Continue PTA paroxetine     Obesity with a BMI greater than 30.  Consider lifestyle modification with diet and exercise as able to.    Physical deconditioning, chronic debility.  Patient with chronic debility, resident of assisted living facility.  Reports is mostly bedbound, uses Terrie lift to ambulate out of bed.   PT, OT evaluation requested.    Follow-up COVID-19 PCR test result  COVID-19 infection 5/2021 recovered.  Vaccinated for  COVID-19.    DVT Prophylaxis: SCD, hold off pharmacological prophylaxis in the setting of GI bleed.  Code Status: Full code.    Disposition: Expected discharge in 2 days pending clinical improvement.  More than 50% of time spent in direct patient care, care coordination, patient counseling, and formalizing plan of care.  Discussed with patient, ED physician.    Javad Rordiguez MD          Chief Complaint:     History is obtained from patient, review of medical records.    Ron Gilmore is a 81 year old  male with medical history of CVA with residual left-sided weakness, COPD, chronic urinary retention, history of septic shock March 2021 secondary to E. coli bacteremia due to obstructive lumbosacral UV junction stone with hydronephrosis, status post right side percutaneous tube placement and removal and right side stent placement, hyperlipidemia, depression, diabetes mellitus, dysphagia sent into the ED for black-colored stools and blood in the urine.    Patient with chronic debility, resident of assisted living facility.  Reports is mostly bedbound, uses Terrie lift to ambulate out of bed.  Reports had difficulty passing urine, was found to have blood around it, catheter removed and unable to be replaced.  Also noted to have black-colored stool by staff at assisted living facility prompting evaluation in ED.    Patient denies any fever or chills.  Denies any headache or dizziness.  Denies any nausea vomiting.  Denies any diarrhea or constipation.  Denies any  blood in the stools or blood in the urine.  Admits to having difficulty passing urine.  Denies any chest pain or shortness of breath.  Denies any tingling or numbness.  Denies any new focal weakness.  Denies any excessive alcohol intake.  Denies any excessive NSAID intake.  Prior to admission on aspirin 81 mg oral daily.  History of GI bleed in May 2021, had EGD and colonoscopy then.  Reports on 1 L of nasal oxygen at home.    In ED afebrile.  Tachycardic to  114.  Cardona catheter changed.  WBC 11.4, lactic acid 2.6.  Hemoglobin at 11.4.  Chest x-ray no acute pathology.  EKG sinus rhythm with right bundle branch block.  UA large number of white cells, red cells with bacteria.         Review of Systems:     GENERAL: See HPI.  EENT: No new vision changes, no sinus problems  PULMONARY: No shortness of breath  CARDIAC: no chest pain  GI: See HPI.  : See HPI.  NEURO: No significant headaches, no loss of consciousness  ENDOCRINE: No excessive thirst  MUSCULOSKELETAL: No new joint pain   SKIN: No skin rashes  PSYCHIATRY no anxiety    Medical History:     Past Medical History:   Diagnosis Date     BPH (benign prostatic hyperplasia)      Cataract      Cholelithiasis      COPD (chronic obstructive pulmonary disease) (H)      Depression      Diabetes mellitus     Type 2     Dyshidrotic foot dermatitis      Edema      Gout      Hyperlipidemia      Hypertension      Kidney stones     Bladder Stones     Lumbago      Lumbar disc displacement without myelopathy      Muscle weakness      Neuropathy, diabetic (H)      Obesity      Spinal stenosis      Stroke (H)     with residual effects- weakness LUE> LLE     Unsteady gait      Urinary retention with incomplete bladder emptying      UTI (urinary tract infection)      Vasovagal episode         Surgical History:      Past Surgical History:   Procedure Laterality Date     APPENDECTOMY OPEN       ARTHROSCOPY SHOULDER ROTATOR CUFF REPAIR       cataracts Bilateral      CHOLECYSTECTOMY       COLONOSCOPY  1986     COLONOSCOPY N/A 5/29/2021    Procedure: COLONOSCOPY;  Surgeon: Kofi Davis MD;  Location:  GI     CYSTOSCOPY  10/19/2011    Procedure:CYSTOSCOPY; CYSTOSCOPY, BLADDER STONE REMOVAL; Surgeon:IRVIN SAWYER; Location: OR     CYSTOSCOPY, TRANSURETHRAL RESECTION (TUR) PROSTATE, COMBINED N/A 2/21/2018    Procedure: COMBINED CYSTOSCOPY, TRANSURETHRAL RESECTION (TUR) PROSTATE;  COMBINED CYSTOSCOPY, TRANSURETHRAL RESECTION (TUR)  PROSTATE ;  Surgeon: Rob Sullivan MD;  Location:  OR     EP LOOP RECORDER IMPLANT N/A 1/20/2020    Procedure: EP Loop Recorder Implant;  Surgeon: Evgeny Parisi MD;  Location:  [facility] CATH LAB     ESOPHAGOSCOPY, GASTROSCOPY, DUODENOSCOPY (EGD), COMBINED N/A 5/28/2021    Procedure: ESOPHAGOGASTRODUODENOSCOPY (EGD);  Surgeon: Aurora Waterman MD;  Location:  GI     EYE SURGERY      right lid surgery      IR IVC FILTER PLACEMENT  5/24/2021     IR NEPHROSTOMY TUBE PLACEMENT RIGHT  3/9/2021     IR URETERAL STENT PLACEMENT RIGHT  3/16/2021     JOINT REPLACEMENT Right     HIP     KNEE SURGERY Bilateral      LAMINECTOMY LUMBAR ONE LEVEL       LASER HOLMIUM LITHOTRIPSY URETER(S), INSERT STENT, COMBINED Right 4/14/2021    Procedure: CYSTOSCOPY, BLADDER STONE REMOVAL, RIGHT URETEROSCOPY, HOLMIUM LASER LITHOTRIPSY, AND RIGHT STENT REMOVAL, RIGHT RETROGRADE;  Surgeon: Rob Sullivan MD;  Location:  OR     TONSILLECTOMY               Social History:      Social History     Tobacco Use     Smoking status: Former Smoker     Smokeless tobacco: Never Used   Substance Use Topics     Alcohol use: No     Comment: none for 29 yrs             Family History:     Family History   Problem Relation Age of Onset     Prostate Cancer Father              Allergies:   No Known Allergies          Medications:   Home meds reviewed          Physical Exam      Vital Signs with Ranges  Temp:  [98.7  F (37.1  C)] 98.7  F (37.1  C)  Pulse:  [] 93  Resp:  [14-22] 14  BP: (104-158)/(69-99) 114/70  SpO2:  [90 %-96 %] 96 %    PHYSICAL EXAM  GENERAL: Patient is in no distress. Alert and oriented.  HEENT: Oropharynx dry.  HEART: Regular rate and rhythm. S1S2. No murmurs  LUNGS: Clear to auscultation bilaterally. No expiratory wheeze.  Respirations unlabored  ABDOMEN: Soft, no abdominal tenderness, bowel sounds heard   No suprapubic tenderness.  No costovertebral angle tenderness.  NEURO: Cranial nerves grossly intact.   Left hemiplegia.  EXTREMITIES: No pedal edema. 2+ peripheral pulses.  SKIN: Warm, dry. No rash or bruising.  PSYCHIATRY Cooperative         Data:   All new lab and imaging data was reviewed.

## 2021-11-09 ENCOUNTER — APPOINTMENT (OUTPATIENT)
Dept: ULTRASOUND IMAGING | Facility: CLINIC | Age: 81
End: 2021-11-09
Attending: INTERNAL MEDICINE
Payer: COMMERCIAL

## 2021-11-09 ENCOUNTER — APPOINTMENT (OUTPATIENT)
Dept: PHYSICAL THERAPY | Facility: CLINIC | Age: 81
End: 2021-11-09
Attending: HOSPITALIST
Payer: COMMERCIAL

## 2021-11-09 PROBLEM — U07.1 INFECTION DUE TO 2019 NOVEL CORONAVIRUS: Status: RESOLVED | Noted: 2021-05-01 | Resolved: 2021-11-09

## 2021-11-09 LAB
ABO/RH(D): NORMAL
ALBUMIN SERPL-MCNC: 2.6 G/DL (ref 3.4–5)
ALP SERPL-CCNC: 56 U/L (ref 40–150)
ALT SERPL W P-5'-P-CCNC: 14 U/L (ref 0–70)
ANION GAP SERPL CALCULATED.3IONS-SCNC: 1 MMOL/L (ref 3–14)
ANTIBODY SCREEN: NEGATIVE
AST SERPL W P-5'-P-CCNC: 10 U/L (ref 0–45)
ATRIAL RATE - MUSE: 98 BPM
BACTERIA UR CULT: ABNORMAL
BASOPHILS # BLD AUTO: 0 10E3/UL (ref 0–0.2)
BASOPHILS NFR BLD AUTO: 0 %
BILIRUB SERPL-MCNC: 0.4 MG/DL (ref 0.2–1.3)
BUN SERPL-MCNC: 22 MG/DL (ref 7–30)
CALCIUM SERPL-MCNC: 8.8 MG/DL (ref 8.5–10.1)
CHLORIDE BLD-SCNC: 103 MMOL/L (ref 94–109)
CO2 SERPL-SCNC: 34 MMOL/L (ref 20–32)
CREAT SERPL-MCNC: 0.84 MG/DL (ref 0.66–1.25)
DIASTOLIC BLOOD PRESSURE - MUSE: NORMAL MMHG
EOSINOPHIL # BLD AUTO: 0.4 10E3/UL (ref 0–0.7)
EOSINOPHIL NFR BLD AUTO: 4 %
ERYTHROCYTE [DISTWIDTH] IN BLOOD BY AUTOMATED COUNT: 16.9 % (ref 10–15)
GFR SERPL CREATININE-BSD FRML MDRD: 83 ML/MIN/1.73M2
GLUCOSE BLD-MCNC: 131 MG/DL (ref 70–99)
HCT VFR BLD AUTO: 33.5 % (ref 40–53)
HGB BLD-MCNC: 10.2 G/DL (ref 13.3–17.7)
HGB BLD-MCNC: 10.2 G/DL (ref 13.3–17.7)
IMM GRANULOCYTES # BLD: 0.1 10E3/UL
IMM GRANULOCYTES NFR BLD: 1 %
INTERPRETATION ECG - MUSE: NORMAL
IRON SATN MFR SERPL: 9 % (ref 15–46)
IRON SERPL-MCNC: 32 UG/DL (ref 35–180)
LYMPHOCYTES # BLD AUTO: 1.9 10E3/UL (ref 0.8–5.3)
LYMPHOCYTES NFR BLD AUTO: 19 %
MCH RBC QN AUTO: 31 PG (ref 26.5–33)
MCHC RBC AUTO-ENTMCNC: 30.4 G/DL (ref 31.5–36.5)
MCV RBC AUTO: 102 FL (ref 78–100)
MONOCYTES # BLD AUTO: 0.9 10E3/UL (ref 0–1.3)
MONOCYTES NFR BLD AUTO: 9 %
NEUTROPHILS # BLD AUTO: 6.5 10E3/UL (ref 1.6–8.3)
NEUTROPHILS NFR BLD AUTO: 67 %
NRBC # BLD AUTO: 0 10E3/UL
NRBC BLD AUTO-RTO: 0 /100
P AXIS - MUSE: 44 DEGREES
PLATELET # BLD AUTO: 210 10E3/UL (ref 150–450)
POTASSIUM BLD-SCNC: 4 MMOL/L (ref 3.4–5.3)
PR INTERVAL - MUSE: 190 MS
PROT SERPL-MCNC: 7.1 G/DL (ref 6.8–8.8)
QRS DURATION - MUSE: 128 MS
QT - MUSE: 390 MS
QTC - MUSE: 497 MS
R AXIS - MUSE: 24 DEGREES
RBC # BLD AUTO: 3.29 10E6/UL (ref 4.4–5.9)
SODIUM SERPL-SCNC: 138 MMOL/L (ref 133–144)
SYSTOLIC BLOOD PRESSURE - MUSE: NORMAL MMHG
T AXIS - MUSE: 37 DEGREES
TIBC SERPL-MCNC: 351 UG/DL (ref 240–430)
TSH SERPL DL<=0.005 MIU/L-ACNC: 1.09 MU/L (ref 0.4–4)
VENTRICULAR RATE- MUSE: 98 BPM
WBC # BLD AUTO: 9.8 10E3/UL (ref 4–11)

## 2021-11-09 PROCEDURE — C9113 INJ PANTOPRAZOLE SODIUM, VIA: HCPCS | Performed by: HOSPITALIST

## 2021-11-09 PROCEDURE — 250N000013 HC RX MED GY IP 250 OP 250 PS 637: Performed by: HOSPITALIST

## 2021-11-09 PROCEDURE — 36415 COLL VENOUS BLD VENIPUNCTURE: CPT | Performed by: HOSPITALIST

## 2021-11-09 PROCEDURE — G0378 HOSPITAL OBSERVATION PER HR: HCPCS

## 2021-11-09 PROCEDURE — 82040 ASSAY OF SERUM ALBUMIN: CPT | Performed by: HOSPITALIST

## 2021-11-09 PROCEDURE — 85018 HEMOGLOBIN: CPT | Mod: 91 | Performed by: PHYSICIAN ASSISTANT

## 2021-11-09 PROCEDURE — 85025 COMPLETE CBC W/AUTO DIFF WBC: CPT | Performed by: HOSPITALIST

## 2021-11-09 PROCEDURE — 96376 TX/PRO/DX INJ SAME DRUG ADON: CPT

## 2021-11-09 PROCEDURE — 99233 SBSQ HOSP IP/OBS HIGH 50: CPT | Performed by: INTERNAL MEDICINE

## 2021-11-09 PROCEDURE — 250N000011 HC RX IP 250 OP 636: Performed by: HOSPITALIST

## 2021-11-09 PROCEDURE — 250N000013 HC RX MED GY IP 250 OP 250 PS 637: Performed by: INTERNAL MEDICINE

## 2021-11-09 PROCEDURE — 97161 PT EVAL LOW COMPLEX 20 MIN: CPT | Mod: GP

## 2021-11-09 PROCEDURE — 76770 US EXAM ABDO BACK WALL COMP: CPT

## 2021-11-09 PROCEDURE — 84443 ASSAY THYROID STIM HORMONE: CPT | Performed by: HOSPITALIST

## 2021-11-09 PROCEDURE — 36415 COLL VENOUS BLD VENIPUNCTURE: CPT | Performed by: PHYSICIAN ASSISTANT

## 2021-11-09 RX ORDER — ACETAMINOPHEN 650 MG/1
650 SUPPOSITORY RECTAL EVERY 6 HOURS PRN
Status: DISCONTINUED | OUTPATIENT
Start: 2021-11-09 | End: 2021-11-10 | Stop reason: HOSPADM

## 2021-11-09 RX ORDER — PAROXETINE 30 MG/1
30 TABLET, FILM COATED ORAL EVERY MORNING
Status: DISCONTINUED | OUTPATIENT
Start: 2021-11-10 | End: 2021-11-10 | Stop reason: HOSPADM

## 2021-11-09 RX ORDER — ALLOPURINOL 300 MG/1
300 TABLET ORAL DAILY
Status: DISCONTINUED | OUTPATIENT
Start: 2021-11-09 | End: 2021-11-10 | Stop reason: HOSPADM

## 2021-11-09 RX ORDER — FUROSEMIDE 40 MG
40 TABLET ORAL DAILY
Status: DISCONTINUED | OUTPATIENT
Start: 2021-11-09 | End: 2021-11-10 | Stop reason: HOSPADM

## 2021-11-09 RX ORDER — PAROXETINE 20 MG/1
20 TABLET, FILM COATED ORAL DAILY
Status: DISCONTINUED | OUTPATIENT
Start: 2021-11-09 | End: 2021-11-09 | Stop reason: DRUGHIGH

## 2021-11-09 RX ORDER — ACETAMINOPHEN 325 MG/1
650 TABLET ORAL EVERY 6 HOURS PRN
Status: DISCONTINUED | OUTPATIENT
Start: 2021-11-09 | End: 2021-11-10 | Stop reason: HOSPADM

## 2021-11-09 RX ORDER — DOCUSATE SODIUM 100 MG/1
100 CAPSULE, LIQUID FILLED ORAL 2 TIMES DAILY
Status: DISCONTINUED | OUTPATIENT
Start: 2021-11-09 | End: 2021-11-10 | Stop reason: HOSPADM

## 2021-11-09 RX ORDER — ONDANSETRON 4 MG/1
4 TABLET, ORALLY DISINTEGRATING ORAL EVERY 6 HOURS PRN
Status: DISCONTINUED | OUTPATIENT
Start: 2021-11-09 | End: 2021-11-10 | Stop reason: HOSPADM

## 2021-11-09 RX ORDER — CEFTRIAXONE 1 G/1
1 INJECTION, POWDER, FOR SOLUTION INTRAMUSCULAR; INTRAVENOUS EVERY 12 HOURS
Status: DISCONTINUED | OUTPATIENT
Start: 2021-11-09 | End: 2021-11-10 | Stop reason: HOSPADM

## 2021-11-09 RX ORDER — LIDOCAINE 40 MG/G
CREAM TOPICAL
Status: DISCONTINUED | OUTPATIENT
Start: 2021-11-09 | End: 2021-11-10 | Stop reason: HOSPADM

## 2021-11-09 RX ORDER — POLYETHYLENE GLYCOL 3350 17 G/17G
17 POWDER, FOR SOLUTION ORAL DAILY
Status: DISCONTINUED | OUTPATIENT
Start: 2021-11-09 | End: 2021-11-10 | Stop reason: HOSPADM

## 2021-11-09 RX ORDER — FERROUS SULFATE 325(65) MG
325 TABLET ORAL DAILY
Status: DISCONTINUED | OUTPATIENT
Start: 2021-11-09 | End: 2021-11-10 | Stop reason: HOSPADM

## 2021-11-09 RX ORDER — ONDANSETRON 2 MG/ML
4 INJECTION INTRAMUSCULAR; INTRAVENOUS EVERY 6 HOURS PRN
Status: DISCONTINUED | OUTPATIENT
Start: 2021-11-09 | End: 2021-11-10 | Stop reason: HOSPADM

## 2021-11-09 RX ORDER — ATORVASTATIN CALCIUM 10 MG/1
10 TABLET, FILM COATED ORAL DAILY
Status: DISCONTINUED | OUTPATIENT
Start: 2021-11-09 | End: 2021-11-10 | Stop reason: HOSPADM

## 2021-11-09 RX ADMIN — PANTOPRAZOLE SODIUM 40 MG: 40 INJECTION, POWDER, FOR SOLUTION INTRAVENOUS at 20:41

## 2021-11-09 RX ADMIN — FUROSEMIDE 40 MG: 40 TABLET ORAL at 14:18

## 2021-11-09 RX ADMIN — ALLOPURINOL 300 MG: 300 TABLET ORAL at 09:52

## 2021-11-09 RX ADMIN — CEFTRIAXONE SODIUM 1 G: 1 INJECTION, POWDER, FOR SOLUTION INTRAMUSCULAR; INTRAVENOUS at 14:18

## 2021-11-09 RX ADMIN — METOPROLOL TARTRATE 12.5 MG: 25 TABLET, FILM COATED ORAL at 09:53

## 2021-11-09 RX ADMIN — DOCUSATE SODIUM 100 MG: 100 CAPSULE, LIQUID FILLED ORAL at 20:41

## 2021-11-09 RX ADMIN — FERROUS SULFATE TAB 325 MG (65 MG ELEMENTAL FE) 325 MG: 325 (65 FE) TAB at 09:54

## 2021-11-09 RX ADMIN — ATORVASTATIN CALCIUM 10 MG: 10 TABLET, FILM COATED ORAL at 09:54

## 2021-11-09 RX ADMIN — PANTOPRAZOLE SODIUM 40 MG: 40 INJECTION, POWDER, FOR SOLUTION INTRAVENOUS at 08:36

## 2021-11-09 RX ADMIN — DOCUSATE SODIUM 100 MG: 100 CAPSULE, LIQUID FILLED ORAL at 09:53

## 2021-11-09 RX ADMIN — CEFTRIAXONE SODIUM 1 G: 1 INJECTION, POWDER, FOR SOLUTION INTRAMUSCULAR; INTRAVENOUS at 01:51

## 2021-11-09 RX ADMIN — METOPROLOL TARTRATE 12.5 MG: 25 TABLET, FILM COATED ORAL at 20:41

## 2021-11-09 RX ADMIN — Medication 500 MCG: at 16:00

## 2021-11-09 ASSESSMENT — ACTIVITIES OF DAILY LIVING (ADL)
ADLS_ACUITY_SCORE: 11
ADLS_ACUITY_SCORE: 9
ADLS_ACUITY_SCORE: 11
DEPENDENT_IADLS:: MEDICATION MANAGEMENT

## 2021-11-09 NOTE — PLAN OF CARE
VSS. 2L O2 nasal cannula. Alert/oriented x 4, able to express needs. Cardona is patent and draining. NPO for GI consult. Will continue to monitor.     /68   Pulse 85   Temp 98.9  F (37.2  C)   Resp 15   Ht 1.829 m (6')   SpO2 98%   BMI 25.04 kg/m

## 2021-11-09 NOTE — PLAN OF CARE
Physical Therapy Discharge Summary    Reason for therapy discharge:    No further expectations of functional progress. Evaluation only.    Progress towards therapy goal(s). See goals on Care Plan in [facility] electronic health record for goal details.  Patient at baseline levels of assist, uses Terrie lift and power chair, denied further PT.    Therapy recommendation(s):    Patient at baseline levels of assist, uses Terrie lift and power chair, patient denies need of physical therapy.

## 2021-11-09 NOTE — PROGRESS NOTES
[facility]    Hospitalist Progress Note    Brief Summary:  Ron Gilmore is a 81 year old  male with medical history of CVA with residual left-sided weakness, COPD, chronic urinary retention, history of septic shock March 2021 secondary to E. coli bacteremia due to obstructive l UV junction stone with hydronephrosis, status post right side percutaneous tube placement and removal and right side stent placement, hyperlipidemia, depression, diabetes mellitus, dysphagia sent into the ED for black-colored stools and blood in the urine.    Assessment & Plan      Urinary tract infection, catheter associated.  Hematuria likely from urinary tract infection/traumatic  Now resolved   Severe  Sepsis secondary to UTI   Chronic indwelling Cardona catheter.  History of obstructive right-sided ureteral stone with hydronephrosis status post percutaneous nephrostomy tube placement with subsequent removal in the past, I will go ahead and do the US renal to rule out any hydronephrosis at this time.     Patient send to ED from his facility as he has difficulty passing urine and evidence of some blood around the catheter, catheter replaced in ED, UA consistent with UTI and had elevated lactic acid, mild leukocytosis and tachycardia, he was give IV fluid bolus in ED and lactic acid now normal. He is started on IV Ceftriaxone.     Blood culture, urine culture pending, hematuria now resolve, having good urine out put, afebrile and feeling better. Overall improve at this time.     Black stool, rule out GI bleed.  Chronic anemia, suspected upper GI bleed May 2021.  During his admission in May 2021 had EGD which was normal, colonoscopy showed diverticulosis.  Then received blood transfusion and iron infusion for anemia.  Now presenting with dark-colored stools at assisted living facility.  Hemoglobin 11.3 on admission,   Now 10.2, most likely its dilutional at this time.   Patient has not seen stool by himself and he  is on ferrous sulfate. I will send the stool for occult blood.  Agree with IV Protonix, but normal EGD in May, not suspecting any acute GI bleed at this time.   GI consulted on admission.   Holding aspirin at this time, if Hb remain stable and no more melena will restart aspirin.   Sent stool for occult blood.      Chronic hypoxic respiratory failure, oxygen dependent.  Chronic obstructive lung disease/emphysema  Possible obstructive sleep apnea.  In ED on home oxygen oxygen saturations greater than 90%.  Denies any chest pain or shortness of breath.   Chest x-ray no acute pathology. He is on his baseline 1 liter of oxygen thru nasal cannula.   Continue PTA inhaler therapy.  Continue PTA supplemental nasal oxygen.  Consider sleep studies as outpatient.      Paroxysmal atrial fibrillation not on anticoagulation   History of chronic heart failure with preserved EF.  Hypertension, hyperlipidemia.  Denies any new shortness of breath.  No lower extremity edema.  Tachycardic to 114.  EKG sinus rhythm with right bundle branch block.  Hold PTA aspirin 81 mg daily.  Continue PTA Lipitor, metoprolol.  Lasix hold on admission, I will restart it today     History of pulmonary embolism s/p IVC filter 5/2021  Right peroneal vein DVT  Holding PTA aspirin 81 mg tonight, likely will restart from tomorrow.      History of CVA with residual left-sided weakness.  Continue PTA statin.  Hold PTA aspirin today,  Likely restart from tomorrow.      Type 2 diabetes mellitus with last hemoglobin A1c of 6.  Monitor blood sugars periodically.     Depression   Continue PTA paroxetine      Obesity with a BMI greater than 30.  Consider lifestyle modification with diet and exercise as able to.     Physical deconditioning, chronic debility.  Patient with chronic debility, resident of assisted living facility.  Reports is mostly bedbound, uses Terrie lift to ambulate out of bed because of his prior stroke and left hemiplegia.        COVID-19 PCR test  negative.   COVID-19 infection 5/2021 recovered.  Vaccinated for COVID-19.       DVT Prophylaxis: Pneumatic Compression Devices  Code Status: Full Code    Disposition: Expected discharge in 1-2 days once remain stable.     Jose George MD  Text Page  (7am - 6pm)    Interval History   Patient feeling much better, denies any chest pain, SOB, fever, chills, nausea, vomiting, headache, dizziness, abdominal pain or back pain at this time.     No other significant event overnight.     -Data reviewed today: I reviewed all new labs and imaging results over the last 24 hours. I personally reviewed no images or EKG's today.    Physical Exam   Temp: 97.9  F (36.6  C) Temp src: Oral BP: (!) 144/69 Pulse: 89   Resp: 18 SpO2: 94 % O2 Device: Nasal cannula Oxygen Delivery: 2 LPM  There were no vitals filed for this visit.  Vital Signs with Ranges  Temp:  [97.9  F (36.6  C)-98.9  F (37.2  C)] 97.9  F (36.6  C)  Pulse:  [] 89  Resp:  [11-20] 18  BP: (104-160)/(67-93) 144/69  SpO2:  [90 %-99 %] 94 %  I/O last 3 completed shifts:  In: -   Out: 1300 [Urine:1300]    Constitutional: awake, alert, cooperative, no apparent distress, and appears stated age  Eyes: Lids and lashes normal, pupils equal, round and reactive to light, extra ocular muscles intact, sclera clear, conjunctiva normal  Respiratory: No increased work of breathing, good air exchange, clear to auscultation bilaterally, no crackles or wheezing  Cardiovascular: Normal apical impulse, regular rate and rhythm, normal S1 and S2, no S3 or S4, and no murmur noted  GI: No scars, normal bowel sounds, soft, non-distended, non-tender, no masses palpated, no hepatosplenomegally  Musculoskeletal: no lower extremity pitting edema present  Neurologic: left hemiplegia     Medications       allopurinol  300 mg Oral Daily     atorvastatin  10 mg Oral Daily     cefTRIAXone  1 g Intravenous Q12H     docusate sodium  100 mg Oral BID     ferrous sulfate  325 mg Oral Daily      ipratropium-albuterol  1 puff Inhalation 4x Daily     metoprolol tartrate  12.5 mg Oral BID     pantoprazole (PROTONIX) IV  40 mg Intravenous BID     PARoxetine  20 mg Oral Daily     polyethylene glycol  17 g Oral Daily     sodium chloride (PF)  3 mL Intracatheter Q8H       Data   Recent Labs   Lab 11/09/21  0832 11/09/21  0158 11/08/21  1252   WBC  --  9.8 11.4*   HGB  --  10.2* 11.3*   MCV  --  102* 101*   PLT  --  210 238   INR  --   --  1.13     --  139   POTASSIUM 4.0  --  4.0   CHLORIDE 103  --  103   CO2 34*  --  32   BUN 22  --  21   CR 0.84  --  0.89   ANIONGAP 1*  --  4   RAJ 8.8  --  9.5   *  --  175*   ALBUMIN 2.6*  --  3.1*   PROTTOTAL 7.1  --  8.1   BILITOTAL 0.4  --  0.5   ALKPHOS 56  --  65   ALT 14  --  16   AST 10  --  11       Recent Results (from the past 24 hour(s))   XR Chest Port 1 View    Narrative    XR PORTABLE CHEST ONE VIEW   11/8/2021 3:38 PM     HISTORY: Cough    COMPARISON: 5/29/2021.      Impression    IMPRESSION: No acute cardiopulmonary disease.    TODD GALICIA MD         SYSTEM ID:  [id number]

## 2021-11-09 NOTE — ED NOTES
Black stool noted upon changing patient - pt cleaned and repositioned in stretcher. Lights dimmed, callbell at bedside

## 2021-11-09 NOTE — CONSULTS
Care Management Initial Consult    General Information  Assessment completed with: Care Team Member,  (Shireen @ [facility])  Type of CM/SW Visit: Initial Assessment    Primary Care Provider verified and updated as needed: Yes   Readmission within the last 30 days: no previous admission in last 30 days         Advance Care Planning: Advance Care Planning Reviewed: no concerns identified   (no HCA)       Communication Assessment  Patient's communication style: spoken language (English or Bilingual)    Hearing Difficulty or Deaf: no   Wear Glasses or Blind: yes    Cognitive  Cognitive/Neuro/Behavioral: .WDL except,speech,motor response  Level of Consciousness: alert  Arousal Level: opens eyes spontaneously  Orientation: oriented x 4  Mood/Behavior: calm,cooperative  Best Language: 1 - Mild to moderate  Speech: garbled    Living Environment:   People in home: facility resident     Current living Arrangements: assisted living  Name of Facility:  ([facility])   Able to return to prior arrangements: yes       Family/Social Support:  Care provided by:  Facility staff  Provides care for:  No one  Marital Status:  (Spouse Yuli lives in [facility])             Description of Support System:           Current Resources:   Patient receiving home care services: No     Community Resources:    Equipment currently used at home: wheelchair, power,lift device,hospital bed,shower chair (aleks to shower chair or bed bath)  Supplies currently used at home:      Employment/Financial:  Employment Status:          Financial Concerns: No concerns identified           Lifestyle & Psychosocial Needs:  Social Determinants of Health     Tobacco Use: Medium Risk     Smoking Tobacco Use: Former Smoker     Smokeless Tobacco Use: Never Used   Alcohol Use: Not on file   Financial Resource Strain: Not on file   Food Insecurity: Not on file   Transportation Needs: Not on file   Physical Activity: Not on  file   Stress: Not on file   Social Connections: Not on file   Intimate Partner Violence: Not on file   Depression: Not on file   Housing Stability: Not on file       Functional Status:  Prior to admission patient needed assistance:   Dependent ADLs:: Wheelchair-with assist,Toileting  Dependent IADLs:: Medication Management       Mental Health Status:  Chemical Dependency Status:  Values/Beliefs:  Spiritual, Cultural Beliefs, Mandaeism Practices, Values that affect care:                 Additional Information:  Spoke w/Shireen @ [facility] [phone].  Patient is essentially total care r/t CVI left sided hemiplegia. Assist w/ all ADL's, he can occasionally feed himself. Home oxygen 1 L NC. They use a Terrie and he has a motorized wheelchair.  Upon discharge, orders to be Faxed to [phone]  The facility uses [facility] for any new meds if ordered.  Will need Transport set up at discharge

## 2021-11-09 NOTE — PROGRESS NOTES
11/09/21 1141   Quick Adds   Type of Visit Initial PT Evaluation   Living Environment   People in home facility resident   Current Living Arrangements residential facility  ([facility])   Home Accessibility wheelchair accessible   Transportation Anticipated agency   Living Environment Comments Spouse lives in separate building at [facility]   Self-Care   Usual Activity Tolerance poor   Current Activity Tolerance poor   Regular Exercise No   Equipment Currently Used at Home wheelchair, power;lift device;hospital bed;shower chair  (aleks to shower chair or bed bath)   Activity/Exercise/Self-Care Comment Uses power wheelchair at baseline, aleks lift to wheelchair, catheter, bed bath   Disability/Function   Hearing Difficulty or Deaf no   Wear Glasses or Blind yes   Vision Management reading on/off   Concentrating, Remembering or Making Decisions Difficulty no   Difficulty Communicating no   Difficulty Eating/Swallowing no   Walking or Climbing Stairs Difficulty yes   Walking or Climbing Stairs transferring difficulty, requires equipment;transferring difficulty, dependent;ambulation difficulty, dependent   Mobility Management power chair, aleks lift   Dressing/Bathing Difficulty yes   Dressing/Bathing bathing difficulty, assistance 1 person;dressing difficulty, assistance 1 person   Dressing/Bathing Management staff assist   Toileting issues yes   Toileting Management catheter   Doing Errands Independently Difficulty (such as shopping) yes   Errands Management lives in AL   Fall history within last six months no   General Information   Onset of Illness/Injury or Date of Surgery 11/08/21   Referring Physician Javad Rodriguez MD   Patient/Family Therapy Goals Statement (PT) To return to AL   Existing Precautions/Restrictions fall  (non ambulatory)   General Observations Patient has been [facility] for 2.5 years   Cognition   Orientation Status (Cognition) oriented x 4   Posture    Posture Comments Patient lies supine in KAI  max ER, KAI feet inversion   Range of Motion (ROM)   ROM Comment PROM L LE WNL but painful in knee, R LE AROM WNL except unable to active DF (drop foot), PROM DF WNL   Strength   Manual Muscle Testing Quick Adds Able to perform R SLR   Strength Comments Unable to lift L LE, requires max A   Bed Mobility   Bed Mobility rolling left   Rolling Left [place] (Bed Mobility) moderate assist (50% patient effort)   Bed Mobility Limitations decreased ability to use legs for bridging/pushing   Impairments Contributing to Impaired Bed Mobility impaired motor control;decreased strength  (Unable to use L UE)   Assistive Device (Bed Mobility) bed rails   Transfers   Transfer Safety Comments Patient at baseline uses aleks lift at facility, denied performing today   Gait/Stairs (Locomotion)   Comment (Gait/Stairs) Not applicable, baseline uses power chair   Balance   Balance Comments Denied sitting   Sensory Examination   Sensory Perception Comments Patient in tact to LT in KAI LEs, states sharp pain in L LE sometimes   Muscle Tone   Muscle Tone Comments Flaccid L LE   Clinical Impression   Criteria for Skilled Therapeutic Intervention evaluation only   PT Diagnosis (PT) Baseline mobility: impaired bed mobility, impaired transfers   Influenced by the following impairments Flaccid L side, pain, decreased strength   Functional limitations due to impairments Impaired functional mobility   Clinical Presentation Stable/Uncomplicated   Clinical Presentation Rationale Due to current status, PMH and social support   Clinical Decision Making (Complexity) low complexity   Therapy Frequency (PT) Evaluation only   Predicted Duration of Therapy Intervention (days/wks) 1   Risk & Benefits of therapy have been explained evaluation/treatment results reviewed;care plan/treatment goals reviewed;risks/benefits reviewed;current/potential barriers reviewed;participants voiced agreement with care plan;participants included;patient   PT Discharge  Planning    PT Discharge Recommendation (DC Rec) Long term care facility;home with assist   PT Rationale for DC Rec Patient lives in AL facility, baseline mobility is Terrie lift and power chair, gets assistance with bathing/dressing. Patient is at basleine levels, can discharge back to facility with equipment.   Total Evaluation Time   Total Evaluation Time (Minutes) 30

## 2021-11-09 NOTE — ED NOTES
Writer updated Pt SOFIA Johnson, at [facility] with plan for admit.  Pt daughter López Draper  contact.  Encourage pt to call her to  his dentures at [facility]  Report to Rosemary

## 2021-11-09 NOTE — CONSULTS
[facility]  Gastroenterology Consultation         Ron Gilmore  [address]  [place] [address]  81 year old male    Admission Date/Time: 11/8/2021  Primary Care Provider: Augustin Thomas  Referring / Attending Physician:  Dr. Javad Rodriguez    We were asked to see the patient in consultation by Dr. Javad Rodriguez for evaluation of GI bleed.      CC: melena and hematuria    HPI:  Ron Gilmore is a 81 year old male who has a medical history of CVA with residual left-sided weakness, COPD, chronic urinary retention, history of septic shock March 2021 secondary to E. 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    Patient resides in assisted living facility, is bed bound and has had difficulty urinating with catheter, Catheter reportedly removed and noted blood. Stools noted to be black by staff on one occasion. Patient has had no fever, chills, dizziness, nausea, vomiting, diarrhea, hematochezia, shortness of breath or chest pain. He does not drink alcohol, take NSAIDs. Does report use of aspirin 81 mg daily. Takes daily iron.    Has had previous history of GI bleed in 05/2021 and underwent EGD and colonoscopy. EGD unremarkable. Colonoscopy noted multiple diverticula, and polyps- not removed. NO source bleeding noted.    Labs remarkable for Hemoglobin 11.3->10.2 with baseline of 11-12. UA notes UTI. Lactic acid 2.6->1.7       ROS: A comprehensive ten point review of systems was negative aside from those in mentioned in the HPI.      PAST MED HX:  I have reviewed this patient's medical history and updated it with pertinent information if needed.   Past Medical History:   Diagnosis Date     BPH  (benign prostatic hyperplasia)      Cataract      Cholelithiasis      COPD (chronic obstructive pulmonary disease) (H)      Depression      Diabetes mellitus     Type 2     Dyshidrotic foot dermatitis      Edema      Gout      Hyperlipidemia      Hypertension      Kidney stones     Bladder Stones     Lumbago      Lumbar disc displacement without myelopathy      Muscle weakness      Neuropathy, diabetic (H)      Obesity      Spinal stenosis      Stroke (H)     with residual effects- weakness LUE> LLE     Unsteady gait      Urinary retention with incomplete bladder emptying      UTI (urinary tract infection)      Vasovagal episode        MEDICATIONS:   Prior to Admission Medications   Prescriptions Last Dose Informant Patient Reported? Taking?   Emollient (AMLACTIN ULTRA EX) 11/7/2021 at Unknown time Nursing Home Yes Yes   Sig: Apply topically daily TO FEET   PARoxetine (PAXIL) 30 MG tablet 11/7/2021 at Unknown time Nursing Home Yes Yes   Sig: Take 30 mg by mouth every morning   Skin Protectants, Misc. (CALAZIME SKIN PROTECTANT) PSTE 11/7/2021 at Unknown time Nursing Home Yes Yes   Sig: Externally apply topically 2 times daily Apply to buttocks topically for Excoriation to buttocks/unstageable Apply thin layer to buttocks  Also taking Apply to buttocks topically as needed for excoriation to butocks unstageable with incontinence episode   acetaminophen (TYLENOL) 325 MG tablet 11/8/2021 at Unknown time Nursing Home Yes Yes   Sig: Take 650 mg by mouth every 4 hours as needed for mild pain as needed for pain/fever Max dose 3000mg/24hr   allopurinol (ZYLOPRIM) 300 MG tablet 11/7/2021 at Unknown time Nursing Home Yes Yes   Sig: Take 300 mg by mouth daily   aspirin (ASA) 81 MG EC tablet 11/7/2021 at Unknown time skilled nursing No Yes   Sig: Take 1 tablet (81 mg) by mouth daily   atorvastatin (LIPITOR) 10 MG tablet 11/7/2021 at Unknown time Nursing Home Yes Yes   Sig: Take 10 mg by mouth daily   cyanocobalamin (VITAMIN B-12)  500 MCG SUBL sublingual tablet 11/7/2021 at Unknown time senior care No Yes   Sig: Place 1 tablet (500 mcg) under the tongue daily   ferrous sulfate (FEROSUL) 325 (65 Fe) MG tablet 11/7/2021 at Unknown time Nursing Home Yes Yes   Sig: Take 325 mg by mouth daily   furosemide (LASIX) 40 MG tablet 11/7/2021 at Unknown time senior care No Yes   Sig: Take 1 tablet (40 mg) by mouth daily   ipratropium-albuterol (COMBIVENT RESPIMAT)  MCG/ACT inhaler 11/7/2021 at Unknown time Nursing Home Yes Yes   Sig: Inhale 1 puff into the lungs 4 times daily 0800, 1200 ,1600 ,2000   metoprolol tartrate (LOPRESSOR) 25 MG tablet 11/7/2021 at Unknown time senior care No Yes   Sig: Take 0.5 tablets (12.5 mg) by mouth 2 times daily   pantoprazole (PROTONIX) 40 MG EC tablet 11/7/2021 at Unknown time senior care No Yes   Sig: Take 1 tablet (40 mg) by mouth 2 times daily (before meals)   polyethylene glycol (MIRALAX) 17 GM/Dose powder 11/7/2021 at Unknown time senior care No Yes   Sig: Take 17 g by mouth daily   Patient taking differently: Take 17 g by mouth daily as needed       Facility-Administered Medications: None       ALLERGIES: No Known Allergies    SOCIAL HISTORY:  Social History     Tobacco Use     Smoking status: Former Smoker     Smokeless tobacco: Never Used   Substance Use Topics     Alcohol use: No     Comment: none for 29 yrs     Drug use: No       FAMILY HISTORY:  Family History   Problem Relation Age of Onset     Prostate Cancer Father        PHYSICAL EXAM:   General  Alert, oriented and comfortable  Vital Signs with Ranges  Temp: 97.9  F (36.6  C) Temp src: Oral BP: (!) 144/69 Pulse: 89   Resp: 18 SpO2: 94 % O2 Device: Nasal cannula Oxygen Delivery: 2 LPM  I/O last 3 completed shifts:  In: -   Out: 1300 [Urine:1300]    Constitutional: alert, no distress and cooperative   Cardiovascular: negative, PMI normal. No lifts, heaves, or thrills. RRR. No murmurs, clicks gallops or rub  Respiratory: negative, Percussion  normal. Good diaphragmatic excursion. Lungs clear  Abdomen: Abdomen soft, non-tender. BS normal. No masses, organomegaly          ADDITIONAL COMMENTS:   I reviewed the patient's new clinical lab test results.   Recent Labs   Lab Test 11/09/21  0158 11/08/21  1252 09/01/21  1251 03/13/21  0550 03/12/21  1128 03/11/21  0628 03/10/21  1420   WBC 9.8 11.4* 7.4   < > 14.1*   < >  --    HGB 10.2* 11.3* 11.4*   < > 11.5*   < >  --    * 101* 98   < > 92   < >  --     238 215   < > 21*   < >  --    INR  --  1.13  --   --  1.20*  --  1.58*    < > = values in this interval not displayed.     Recent Labs   Lab Test 11/09/21  0832 11/08/21  1252 09/01/21  1251   POTASSIUM 4.0 4.0 3.6   CHLORIDE 103 103 101   CO2 34* 32 34*   BUN 22 21 19   ANIONGAP 1* 4 2*     Recent Labs   Lab Test 11/09/21  0832 11/08/21  1252 11/08/21  1209 09/01/21  1255 05/30/21  1208 05/30/21  0628   ALBUMIN 2.6* 3.1*  --   --   --  1.8*   BILITOTAL 0.4 0.5  --   --   --  1.7*   ALT 14 16  --   --   --  12   AST 10 11  --   --   --  19   PROTEIN  --   --  100 * 20 * 30*  --        I reviewed the patient's new imaging results.        CONSULTATION ASSESSMENT AND PLAN:    Ron Gilmore is a 81 year old male with multiple chronic health problems, admitted with UTI, sepsis and dark stools. GI consulted on 11/9/21.    Active Problems:  Gastrointestinal hemorrhage, unspecified gastrointestinal hemorrhage type  Anemia  Hemoglobin near baseline 11.4->10.4  Cause of drop in hemoglobin likely dilutional vs blood loss from UTI and sepsis, unlikely PUD, minimal risk factors and stool black on one occasion.  Discussed with patient option to repeat EGD today to determine if any chance of upper GI bleed. Patient prefers to avoid any procedures    -- will treat conservatively with BID pantoprazole  -- Diet as per assisted living diet  -- Serial hemoglobin q 12 hours  -- Hold aspirin until hemoglobin more stable  -- Susan BAUMAN appreciates consult and will  continue to follow along      [facility].  Office: [phone]  Cell : [phone] (Dr. Davis)  Cell: [phone] (Ramya Evangelista PA-C)

## 2021-11-09 NOTE — PROGRESS NOTES
Patient was positive back in May of 2021 for COVID-19. As of 11/8/2021 patient was negative for COVID-19.     At this time patient cannot be considered recovered from COVID because the patient is outside their 90 day window.

## 2021-11-10 ENCOUNTER — PATIENT OUTREACH (OUTPATIENT)
Dept: CARE COORDINATION | Facility: CLINIC | Age: 81
End: 2021-11-10
Payer: MEDICARE

## 2021-11-10 VITALS
HEIGHT: 72 IN | RESPIRATION RATE: 18 BRPM | HEART RATE: 73 BPM | TEMPERATURE: 98.6 F | OXYGEN SATURATION: 95 % | BODY MASS INDEX: 31.9 KG/M2 | SYSTOLIC BLOOD PRESSURE: 118 MMHG | DIASTOLIC BLOOD PRESSURE: 72 MMHG | WEIGHT: 235.5 LBS

## 2021-11-10 LAB — HGB BLD-MCNC: 10.2 G/DL (ref 13.3–17.7)

## 2021-11-10 PROCEDURE — 250N000011 HC RX IP 250 OP 636: Performed by: HOSPITALIST

## 2021-11-10 PROCEDURE — G0378 HOSPITAL OBSERVATION PER HR: HCPCS

## 2021-11-10 PROCEDURE — 85018 HEMOGLOBIN: CPT | Performed by: PHYSICIAN ASSISTANT

## 2021-11-10 PROCEDURE — 250N000013 HC RX MED GY IP 250 OP 250 PS 637: Performed by: INTERNAL MEDICINE

## 2021-11-10 PROCEDURE — 99239 HOSP IP/OBS DSCHRG MGMT >30: CPT | Performed by: INTERNAL MEDICINE

## 2021-11-10 PROCEDURE — 96376 TX/PRO/DX INJ SAME DRUG ADON: CPT

## 2021-11-10 PROCEDURE — 250N000013 HC RX MED GY IP 250 OP 250 PS 637: Performed by: HOSPITALIST

## 2021-11-10 PROCEDURE — 999N000111 HC STATISTIC OT IP EVAL DEFER: Performed by: OCCUPATIONAL THERAPIST

## 2021-11-10 PROCEDURE — C9113 INJ PANTOPRAZOLE SODIUM, VIA: HCPCS | Performed by: HOSPITALIST

## 2021-11-10 PROCEDURE — 36415 COLL VENOUS BLD VENIPUNCTURE: CPT | Performed by: PHYSICIAN ASSISTANT

## 2021-11-10 RX ORDER — CEFPODOXIME PROXETIL 200 MG/1
200 TABLET, FILM COATED ORAL 2 TIMES DAILY
Qty: 14 TABLET | Refills: 0 | Status: SHIPPED | OUTPATIENT
Start: 2021-11-10 | End: 2021-11-17

## 2021-11-10 RX ADMIN — Medication 500 MCG: at 08:30

## 2021-11-10 RX ADMIN — PANTOPRAZOLE SODIUM 40 MG: 40 INJECTION, POWDER, FOR SOLUTION INTRAVENOUS at 08:29

## 2021-11-10 RX ADMIN — ATORVASTATIN CALCIUM 10 MG: 10 TABLET, FILM COATED ORAL at 08:30

## 2021-11-10 RX ADMIN — CEFTRIAXONE SODIUM 1 G: 1 INJECTION, POWDER, FOR SOLUTION INTRAMUSCULAR; INTRAVENOUS at 14:03

## 2021-11-10 RX ADMIN — ALLOPURINOL 300 MG: 300 TABLET ORAL at 08:30

## 2021-11-10 RX ADMIN — FUROSEMIDE 40 MG: 40 TABLET ORAL at 08:30

## 2021-11-10 RX ADMIN — PAROXETINE HYDROCHLORIDE 30 MG: 30 TABLET, FILM COATED ORAL at 08:30

## 2021-11-10 RX ADMIN — FERROUS SULFATE TAB 325 MG (65 MG ELEMENTAL FE) 325 MG: 325 (65 FE) TAB at 08:30

## 2021-11-10 RX ADMIN — DOCUSATE SODIUM 100 MG: 100 CAPSULE, LIQUID FILLED ORAL at 08:30

## 2021-11-10 RX ADMIN — METOPROLOL TARTRATE 12.5 MG: 25 TABLET, FILM COATED ORAL at 08:30

## 2021-11-10 RX ADMIN — CEFTRIAXONE SODIUM 1 G: 1 INJECTION, POWDER, FOR SOLUTION INTRAMUSCULAR; INTRAVENOUS at 03:48

## 2021-11-10 ASSESSMENT — MIFFLIN-ST. JEOR: SCORE: 1821.22

## 2021-11-10 NOTE — PROGRESS NOTES
[facility]  Gastroenterology Progress Note     Ron Gilmore MRN# [id number]   YOB: 1940 Age: 81 year old          Assessment and Plan:   Ron Gilmore is a 81 year old male with multiple chronic health problems, admitted with UTI, sepsis and dark stools. GI consulted on 11/9/21.     Active Problems:  Gastrointestinal hemorrhage, unspecified gastrointestinal hemorrhage type  Anemia  Hemoglobin stable at 10.4  Cause of drop in hemoglobin likely dilutional vs blood loss from UTI and sepsis, unlikely PUD, minimal risk factors and stool black on one occasion.  Discussed with patient option to repeat EGD today to determine if any chance of upper GI bleed. Patient prefers to avoid any procedures     -- will treat conservatively with BID pantoprazole  -- Diet as per assisted living diet  -- Daily hemoglobin  -- Ok to restart aspirin  -- [facility] appreciates consult and will continue to follow along                Interval History:   no new complaints, denies chest pain, denies shortness of breath, denies abdominal pain and doing well; no cp, sob, n/v/d, or abd pain.              Review of Systems:   C: NEGATIVE for fever, chills, change in weight  E/M: NEGATIVE for ear, mouth and throat problems  R: NEGATIVE for significant cough or SOB  CV: NEGATIVE for chest pain, palpitations or peripheral edema             Medications:   I have reviewed this patient's current medications    allopurinol  300 mg Oral Daily     atorvastatin  10 mg Oral Daily     cefTRIAXone  1 g Intravenous Q12H     cyanocobalamin  500 mcg Sublingual Daily     docusate sodium  100 mg Oral BID     ferrous sulfate  325 mg Oral Daily     furosemide  40 mg Oral Daily     ipratropium-albuterol  1 puff Inhalation 4x Daily     metoprolol tartrate  12.5 mg Oral BID     pantoprazole (PROTONIX) IV  40 mg Intravenous BID     PARoxetine  30 mg Oral QAM     polyethylene glycol  17 g Oral Daily     sodium chloride (PF)  3 mL  Intracatheter Q8H                  Physical Exam:   Vitals were reviewed  Vital Signs with Ranges  Temp:  [98.2  F (36.8  C)-98.6  F (37  C)] 98.2  F (36.8  C)  Pulse:  [77-97] 77  Resp:  [16] 16  BP: ()/(51-69) 126/67  SpO2:  [91 %-94 %] 93 %  I/O last 3 completed shifts:  In: -   Out: 1800 [Urine:1800]  Constitutional: healthy, alert and no distress   Cardiovascular: negative, PMI normal. No lifts, heaves, or thrills. RRR. No murmurs, clicks gallops or rub  Respiratory: negative, Percussion normal. Good diaphragmatic excursion. Lungs clear  Abdomen: Abdomen soft, non-tender. BS normal. No masses, organomegaly           Data:   I reviewed the patient's new clinical lab test results.   Recent Labs   Lab Test 11/10/21  0547 11/09/21  1819 11/09/21  0158 11/08/21  1252 09/01/21  1251 03/13/21  0550 03/12/21  1128 03/11/21  0628 03/10/21  1420   WBC  --   --  9.8 11.4* 7.4   < > 14.1*   < >  --    HGB 10.2* 10.2* 10.2* 11.3* 11.4*   < > 11.5*   < >  --    MCV  --   --  102* 101* 98   < > 92   < >  --    PLT  --   --  210 238 215   < > 21*   < >  --    INR  --   --   --  1.13  --   --  1.20*  --  1.58*    < > = values in this interval not displayed.     Recent Labs   Lab Test 11/09/21  0832 11/08/21  1252 09/01/21  1251   POTASSIUM 4.0 4.0 3.6   CHLORIDE 103 103 101   CO2 34* 32 34*   BUN 22 21 19   ANIONGAP 1* 4 2*     Recent Labs   Lab Test 11/09/21  0832 11/08/21  1252 11/08/21  1209 09/01/21  1255 05/30/21  1208 05/30/21  0628   ALBUMIN 2.6* 3.1*  --   --   --  1.8*   BILITOTAL 0.4 0.5  --   --   --  1.7*   ALT 14 16  --   --   --  12   AST 10 11  --   --   --  19   PROTEIN  --   --  100 * 20 * 30*  --        I reviewed the patient's new imaging results.    All laboratory data reviewed  All imaging studies reviewed by me.    Ramya Evangelista PA-C,  11/10/2021  [facility]  Office : [phone]  Cell: [phone] (Dr. Davis)  Cell: [phone] (Ramya Evangelista PA-C)

## 2021-11-10 NOTE — PLAN OF CARE
Occupational Therapy: Orders received. Chart reviewed and discussed with RN and patient briefly.  Patient admitted from assisted living facility and receives assist for all ADLs except eating, uses aleks lift and power wheelchair for transfers. Patient reporting he is at his baseline for eating, denies need for OT evaluation and treatment. No acute OT needs identified. Will complete orders.

## 2021-11-10 NOTE — UTILIZATION REVIEW
"  Admission Status; Secondary Review Determination         Under the authority of the Utilization Management Committee, the utilization review process indicated a secondary review on the above patient.  The review outcome is based on review of the medical records, discussions with staff, and applying clinical experience noted on the date of the review.       (x) Observation Status Appropriate - This patient does not meet hospital inpatient criteria and is placed in observation status. If this patient's primary payer is Medicare and was admitted as an inpatient, Condition Code 44 should be used and patient status changed to \"observation\".     RATIONALE FOR DETERMINATION   The patient is a 81-year-old male admitted on 11/8/2021.  Patient came to the emergency room from his assisted living facility with difficulty passing urine.  He was noted to have an elevated lactic acid level of 2.6 which dropped quickly to normal after hydration.  He also had a drop in his hemoglobin that was noted and was concerning for blood loss in the GI tract but was more likely due to rehydration and dilution based on GI assessment.  Patient did grow Proteus which is sensitive to the drug was started, ceftriaxone and is being discharged today with Vantin 200 mg twice a day for 7 days.  Likely the patient's diagnosis is significant urinary tract infection and there is not likely enough evidence to diagnose sepsis.  Tachycardia was likely due to dehydration.  Based on diagnosis and discharge today, recommend continuation of observation status.      The severity of illness, intensity of service provided, expected LOS and risk for adverse outcome make the care complex, high risk and appropriate for hospital admission.        The information on this document is developed by the utilization review team in order for the business office to ensure compliance.  This only denotes the appropriateness of proper admission status and does not reflect the " quality of care rendered.         The definitions of Inpatient Status and Observation Status used in making the determination above are those provided in the CMS Coverage Manual, Chapter 1 and Chapter 6, section 70.4.      Sincerely,     Francesco Francis MD  Physician Advisor  Utilization Review/ Case Management  [facility].

## 2021-11-10 NOTE — PLAN OF CARE
Discharge Note    Patient discharged to Assisted Living via EMS/BLS accompanied by no family/friend .  IV: Discontinued  Prescriptions sent with patient to discharge facility .   Belongings reviewed and sent with patient.   Home medications returned to patient: NA  Equipment sent with: patient.   patient verbalizes understanding of discharge instructions. AVS given to patient.

## 2021-11-10 NOTE — PLAN OF CARE
Pt is A&O x4. VSS on 1L O2 via NC. TELE: SR with BBB. Denies pain. L sided hemiplagia. Up A2 with lift, T/R q2h. Blanchable redness to coccyx, mepilex in place. Incontinent of stool. BS active, denies flatus, no BM this shift. Stool sample pending. Cardona patent with good clear yellow UOP, no blood noted. [facility], tolerating well, denies N/V. R PIV SL and infusing intermittent abx. Hgb recheck: 10.2 on q12h Hgb checks. Contact precautions maintained. Discharge pending improvement.

## 2021-11-10 NOTE — CONSULTS
Care Management Discharge Note    Discharge Date: 11/10/2021       Discharge Disposition:  [facility]    Discharge Services:  [facility] at 20:00 (earliest available and they will call [phone] number if earlier is available).    Discharge DME:      Discharge Transportation: agency    Private pay costs discussed: transportation costs discussed with Lula Dawn by connor [phone] based on Medical necessity.    PAS Confirmation Code:  n/a  Patient/family educated on Medicare website which has current facility and service quality ratings:  n/a    Education Provided on the Discharge Plan:  yes  Persons Notified of Discharge Plans: patient, patient's daughter and Shireen at [facility] [phone]  Patient/Family in Agreement with the Plan:  yes    Handoff Referral Completed: Yes packet to [facility] per Shireen seen by [facility] they will see the patient 11/11/21 orders sent to [facility] at [phone]    Additional Information:  Patient has been cleared for discharge back to [facility] today.  Writer faxed orders to [facility] phone [phone] Attn:Shireen  Fax# [phone].   Shireen is aware that the patient has follow up with [facility] and they will coordinate the ride through [employer].  Writer contacted patient's daughter López with the above and will send a copy of the discharge orders in packet to [facility] as she will likely attend the follow up's.  [id number] St[facility] ride is for 20:00 they will call the unit at [phone] if they have a cancellation will update bedside and they should call the [facility] at [phone] if this time changes.   No further needs identified at this time.     Addendum: 11/10/21 16:16  Writer received a call from [facility] and they are available to  the patient in the next 30 mins for return back to [facility]. Writer updated bedside and called and talked to staff at [facility], also let lula Dawn know.         Verenice Ohara RN

## 2021-11-10 NOTE — PLAN OF CARE
A&O x4, VSS on 1L O2. Arrived to unit at approx. 0930. A2 with lift due to left side hemiplagia. IV abx started for UTI. Hgb: 10.2. recheck q12h Stool sample to be collected. Cardona in place with adequate urine output no signs of blood in urine, Incontinent of bowel. Renal US showed no signs of hydronephrosis, GI does not wish to do imaging at this time. Discharge pending improvement

## 2021-11-10 NOTE — DISCHARGE SUMMARY
[facility]    Discharge Summary  Hospitalist    Date of Admission:  11/8/2021  Date of Discharge:  11/10/2021  Discharging Provider: Jose George MD  Date of Service (when I saw the patient): 11/10/21    Discharge Diagnoses   Please refer below     History of Present Illness   Ron Gilmore is an 81 year old male who presented with UTI    Hospital Course   Ron Gilmore is a 81 year old  male with medical history of CVA with residual left-sided weakness, COPD, chronic urinary retention, history of septic shock March 2021 secondary to E. coli bacteremia due to obstructive l UV junction stone with hydronephrosis, status post right side percutaneous tube placement and removal and right side stent placement, hyperlipidemia, depression, diabetes mellitus, dysphagia sent into the ED for black-colored stools and blood in the urine.        Final Discharge Diagnosis and Hospital Course       Urinary tract infection, catheter associated.  Hematuria likely from urinary tract infection/traumatic  Now resolved   Severe  Sepsis secondary to UTI : resolved   Chronic indwelling Cardona catheter.  History of obstructive right-sided ureteral stone with hydronephrosis status post percutaneous nephrostomy tube placement with subsequent removal in the past, US renal this admission negative forhydronephrosis at this time.      Patient send to ED from his facility as he has difficulty passing urine and evidence of some blood around the catheter, catheter replaced in ED, UA consistent with UTI and had elevated lactic acid, mild leukocytosis and tachycardia, he was give IV fluid bolus in ED and lactic acid now normal. He is started on IV Ceftriaxone.      Urine culture obtained and growing Proteus mirabilis sensitive to Ceftriaxone, hematuria now resolve, having good urine out put, afebrile and feeling better. Overall improve at this time.   Patient is feeling well at this time, denies any chest pain, SOB, fever,  chills, nausea, vomiting, headache or dizziness. Good urine out put, change IV ceftriaxone to oral Vantin 200 mg bid for 7 days and discharge him back to his [facility] in stable condition today      Black stool, rule out GI bleed.  Chronic anemia, suspected upper GI bleed May 2021.  During his admission in May 2021 had EGD which was normal, colonoscopy showed diverticulosis.  Then received blood transfusion and iron infusion for anemia.  Now presenting with dark-colored stools at assisted living facility.  Hemoglobin 11.3 on admission,   Now 10.2, most likely its dilutional at this time, every 6 hrs Hb check remain stable at 10.2 at this time.  Patient has not seen stool by himself and he is on ferrous sulfate.   Agree with IV Protonix, but normal EGD in May, not suspecting any acute GI bleed at this time.   GI consulted on admission.   Restart aspirin now, Hb stable, no active bleeding.      Chronic hypoxic respiratory failure, oxygen dependent.  Chronic obstructive lung disease/emphysema  Possible obstructive sleep apnea.  In ED on home oxygen oxygen saturations greater than 90%.  Denies any chest pain or shortness of breath.   Chest x-ray no acute pathology. He is on his baseline 1 liter of oxygen thru nasal cannula.   Continue PTA inhaler therapy.  Continue PTA supplemental nasal oxygen.  Consider sleep studies as outpatient.      Paroxysmal atrial fibrillation not on anticoagulation   History of chronic heart failure with preserved EF.  Hypertension, hyperlipidemia.  Denies any new shortness of breath.  No lower extremity edema.  Tachycardic to 114.  EKG sinus rhythm with right bundle branch block.  resume PTA aspirin 81 mg daily.  Continue PTA Lipitor, metoprolol, Hr now normal.   Restart lasix      History of pulmonary embolism s/p IVC filter 5/2021  Right peroneal vein DVT  resume PTA aspirin 81 mg      History of CVA with residual left-sided weakness.  Continue PTA statin.  resume PTA aspirin today,  Likely  restart from tomorrow.      Type 2 diabetes mellitus with last hemoglobin A1c of 6.  Monitor blood sugars periodically.     Depression   Continue PTA paroxetine      Obesity with a BMI greater than 30.  Consider lifestyle modification with diet and exercise as able to.     Physical deconditioning, chronic debility.  Patient with chronic debility, resident of assisted living facility.  Reports is mostly bedbound, uses Terrie lift to ambulate out of bed because of his prior stroke and left hemiplegia.      Jose George MD, MD    Significant Results and Procedures       Pending Results   These results will be followed up by PCP  Unresulted Labs Ordered in the Past 30 Days of this Admission     No orders found from 10/9/2021 to 11/9/2021.          Code Status   Full Code       Primary Care Physician   Augustin Thomas    Physical Exam   Temp: 98.2  F (36.8  C) Temp src: Oral BP: 126/67 Pulse: 77   Resp: 16 SpO2: 93 % O2 Device: Nasal cannula Oxygen Delivery: 1 LPM  Vitals:    11/10/21 0538   Weight: 106.8 kg (235 lb 8 oz)     Vital Signs with Ranges  Temp:  [98.2  F (36.8  C)-98.6  F (37  C)] 98.2  F (36.8  C)  Pulse:  [77-97] 77  Resp:  [16] 16  BP: ()/(51-69) 126/67  SpO2:  [91 %-94 %] 93 %  I/O last 3 completed shifts:  In: -   Out: 1800 [Urine:1800]    Constitutional: awake, alert, cooperative, no apparent distress, and appears stated age  Eyes: Lids and lashes normal, pupils equal, round and reactive to light, extra ocular muscles intact, sclera clear, conjunctiva normal  Respiratory: No increased work of breathing, good air exchange, clear to auscultation bilaterally, no crackles or wheezing  Cardiovascular: Normal apical impulse, regular rate and rhythm, normal S1 and S2, no S3 or S4, and no murmur noted  GI: No scars, normal bowel sounds, soft, non-distended, non-tender, no masses palpated, no hepatosplenomegally  Musculoskeletal: no lower extremity pitting edema present  Neurologic: left hemiplegia      Discharge Disposition   Discharged to assisted living  Condition at discharge: Stable    Consultations This Hospital Stay   GASTROENTEROLOGY IP CONSULT  CARE MANAGEMENT / SOCIAL WORK IP CONSULT  PHYSICAL THERAPY ADULT IP CONSULT  OCCUPATIONAL THERAPY ADULT IP CONSULT  PHYSICAL THERAPY ADULT IP CONSULT  OCCUPATIONAL THERAPY ADULT IP CONSULT    Time Spent on this Encounter   Jose LAUREN MD, personally saw the patient today and spent greater than 30 minutes discharging this patient.    Discharge Orders      Care Coordination Referral      Reason for your hospital stay    UTI catheter associated     Follow-up and recommended labs and tests     Please have [facility] see the patient within the next 3-5 days for hospital follow up and continued recommendations.   Follow up with Urology in 1-2 week patient declining the need for follow up at this time. Please have [facility] or staff discuss further if needed with the patient.   Follow up with IR for IVC filter removal. Please see patient's scheduled follow up with [facility].     Activity    Your activity upon discharge: activity as tolerated     Diet    Follow this diet upon discharge: Orders Placed This Encounter      Mechanical/Dental Soft Diet     Discharge Medications   Current Discharge Medication List      START taking these medications    Details   cefpodoxime (VANTIN) 200 MG tablet Take 1 tablet (200 mg) by mouth 2 times daily for 7 days  Qty: 14 tablet, Refills: 0    Associated Diagnoses: Urinary tract infection associated with indwelling urethral catheter, initial encounter (H); Sepsis, due to unspecified organism, unspecified whether acute organ dysfunction present (H)         CONTINUE these medications which have NOT CHANGED    Details   acetaminophen (TYLENOL) 325 MG tablet Take 650 mg by mouth every 4 hours as needed for mild pain as needed for pain/fever Max dose 3000mg/24hr      allopurinol (ZYLOPRIM) 300 MG tablet Take 300 mg by mouth daily       aspirin (ASA) 81 MG EC tablet Take 1 tablet (81 mg) by mouth daily  Qty:      Associated Diagnoses: Acute and chronic respiratory failure with hypoxia (H)      atorvastatin (LIPITOR) 10 MG tablet Take 10 mg by mouth daily      cyanocobalamin (VITAMIN B-12) 500 MCG SUBL sublingual tablet Place 1 tablet (500 mcg) under the tongue daily  Qty:      Associated Diagnoses: Vitamin B12 deficiency (non anemic)      Emollient (AMLACTIN ULTRA EX) Apply topically daily TO FEET      ferrous sulfate (FEROSUL) 325 (65 Fe) MG tablet Take 325 mg by mouth daily      furosemide (LASIX) 40 MG tablet Take 1 tablet (40 mg) by mouth daily    Associated Diagnoses: Acute diastolic congestive heart failure (H)      ipratropium-albuterol (COMBIVENT RESPIMAT)  MCG/ACT inhaler Inhale 1 puff into the lungs 4 times daily 0800, 1200 ,1600 ,2000      metoprolol tartrate (LOPRESSOR) 25 MG tablet Take 0.5 tablets (12.5 mg) by mouth 2 times daily    Associated Diagnoses: Cerebrovascular accident (CVA), unspecified mechanism (H)      pantoprazole (PROTONIX) 40 MG EC tablet Take 1 tablet (40 mg) by mouth 2 times daily (before meals)  Qty:      Associated Diagnoses: Acute and chronic respiratory failure with hypoxia (H)      PARoxetine (PAXIL) 30 MG tablet Take 30 mg by mouth every morning      polyethylene glycol (MIRALAX) 17 GM/Dose powder Take 17 g by mouth daily  Qty: 510 g    Associated Diagnoses: Constipation, unspecified constipation type      Skin Protectants, Misc. (CALAZIME SKIN PROTECTANT) PSTE Externally apply topically 2 times daily Apply to buttocks topically for Excoriation to buttocks/unstageable Apply thin layer to buttocks  Also taking Apply to buttocks topically as needed for excoriation to butocks unstageable with incontinence episode           Allergies   No Known Allergies  Data   Most Recent 3 CBC's:Recent Labs   Lab Test 11/10/21  0547 11/09/21  1819 11/09/21  0158 11/08/21  1252 09/01/21  1251   WBC  --   --  9.8 11.4*  7.4   HGB 10.2* 10.2* 10.2* 11.3* 11.4*   MCV  --   --  102* 101* 98   PLT  --   --  210 238 215      Most Recent 3 BMP's:  Recent Labs   Lab Test 11/09/21  0832 11/08/21  1252 09/01/21  1251    139 137   POTASSIUM 4.0 4.0 3.6   CHLORIDE 103 103 101   CO2 34* 32 34*   BUN 22 21 19   CR 0.84 0.89 0.85   ANIONGAP 1* 4 2*   RAJ 8.8 9.5 9.2   * 175* 128*     Most Recent 2 LFT's:  Recent Labs   Lab Test 11/09/21  0832 11/08/21  1252   AST 10 11   ALT 14 16   ALKPHOS 56 65   BILITOTAL 0.4 0.5     Most Recent INR's and Anticoagulation Dosing History:  Anticoagulation Dose History     Recent Dosing and Labs Latest Ref Rng & Units 7/13/2006 1/14/2020 3/10/2021 3/12/2021 11/8/2021    INR 0.85 - 1.15 1.01 1.13 1.58(H) 1.20(H) 1.13        Most Recent 3 Troponin's:  Recent Labs   Lab Test 05/18/21  1938 05/10/21  2110 05/01/21  2152   TROPI <0.015 <0.015 <0.015     Most Recent Cholesterol Panel:  Recent Labs   Lab Test 01/16/20  0735   CHOL 87   LDL 40   HDL 30*   TRIG 83     Most Recent 6 Bacteria Isolates From Any Culture (See EPIC Reports for Culture Details):  Recent Labs   Lab Test 05/30/21  1208 05/18/21  2146 05/18/21  2137 05/18/21 1938 05/11/21  0117 05/10/21  2306   CULT >100,000 colonies/mL  Candida tropicalis  Susceptibility testing not routinely done  * No growth >100,000 colonies/mL  Candida tropicalis  Susceptibility testing not routinely done  * No growth 10,000 to 50,000 colonies/mL  Enterococcus faecalis  *  10,000 to 50,000 colonies/mL  Candida tropicalis  Susceptibility testing not routinely done  * No growth     Most Recent TSH, T4 and A1c Labs:  Recent Labs   Lab Test 11/09/21  0832 05/18/21 1938 05/11/21  0545   TSH 1.09   < >  --    A1C  --   --  6.0*    < > = values in this interval not displayed.     Results for orders placed or performed during the hospital encounter of 11/08/21   XR Chest Port 1 View    Narrative    XR PORTABLE CHEST ONE VIEW   11/8/2021 3:38 PM     HISTORY:  Cough    COMPARISON: 5/29/2021.      Impression    IMPRESSION: No acute cardiopulmonary disease.    TODD GALICIA MD         SYSTEM ID:  [id number]   US Renal Complete    Narrative    US RENAL COMPLETE 11/9/2021 1:40 PM    CLINICAL HISTORY: UTI, rule out hydronephrosis or pyleo  TECHNIQUE: Routine Bilateral Renal and Bladder Ultrasound.    COMPARISON: 5/31/2021    FINDINGS:    RIGHT KIDNEY: 11.7 x 5.1 x 6 cm. Normal without hydronephrosis or  masses. Punctate calculus in the right kidney seen on CT is too small  to be seen sonographically.    LEFT KIDNEY: 10.6 x 4.9 x 4.4 cm. Normal without hydronephrosis or  masses.     BLADDER: Empty. Cardona catheter..      Impression    IMPRESSION:  1.  No hydronephrosis in either kidney.    PENNY DUBON MD         SYSTEM ID:  [id number]     Most Recent 3 CBC's:Recent Labs   Lab Test 11/10/21  0547 11/09/21  1819 11/09/21  0158 11/08/21  1252 09/01/21  1251   WBC  --   --  9.8 11.4* 7.4   HGB 10.2* 10.2* 10.2* 11.3* 11.4*   MCV  --   --  102* 101* 98   PLT  --   --  210 238 215     Most Recent 3 BMP's:Recent Labs   Lab Test 11/09/21  0832 11/08/21  1252 09/01/21  1251    139 137   POTASSIUM 4.0 4.0 3.6   CHLORIDE 103 103 101   CO2 34* 32 34*   BUN 22 21 19   CR 0.84 0.89 0.85   ANIONGAP 1* 4 2*   RAJ 8.8 9.5 9.2   * 175* 128*     Most Recent 2 LFT's:Recent Labs   Lab Test 11/09/21  0832 11/08/21  1252   AST 10 11   ALT 14 16   ALKPHOS 56 65   BILITOTAL 0.4 0.5

## 2021-11-10 NOTE — PROGRESS NOTES
Patient identified for care management outreach, however patient is not on a value based contract so cannot complete outreach. Will escalate to clinic staff if specific needs or resources are indicated.

## 2021-11-12 ENCOUNTER — PATIENT OUTREACH (OUTPATIENT)
Dept: CARE COORDINATION | Facility: CLINIC | Age: 81
End: 2021-11-12
Payer: MEDICARE

## 2021-11-12 NOTE — PROGRESS NOTES
Clinical Product Navigator RN reviewed chart; patient on payer product coverage.  Review results: Not met any any referral criteria at this time, it appears patient has changed primary care providers (per recent discharge summary) to [facility].  Will monitor for future needs    Shireen Dooley RN/Clinical Product Navigator

## 2021-11-22 ENCOUNTER — OFFICE VISIT (OUTPATIENT)
Dept: OTHER | Facility: CLINIC | Age: 81
End: 2021-11-22
Attending: PHYSICIAN ASSISTANT
Payer: COMMERCIAL

## 2021-11-22 VITALS
DIASTOLIC BLOOD PRESSURE: 63 MMHG | HEART RATE: 101 BPM | OXYGEN SATURATION: 99 % | BODY MASS INDEX: 31.83 KG/M2 | RESPIRATION RATE: 18 BRPM | HEIGHT: 72 IN | WEIGHT: 235 LBS | SYSTOLIC BLOOD PRESSURE: 89 MMHG

## 2021-11-22 DIAGNOSIS — I82.461 ACUTE DEEP VEIN THROMBOSIS (DVT) OF CALF MUSCLE VEIN OF RIGHT LOWER EXTREMITY (H): Primary | ICD-10-CM

## 2021-11-22 PROCEDURE — 99205 OFFICE O/P NEW HI 60 MIN: CPT | Performed by: PHYSICIAN ASSISTANT

## 2021-11-22 PROCEDURE — G0463 HOSPITAL OUTPT CLINIC VISIT: HCPCS

## 2021-11-22 ASSESSMENT — MIFFLIN-ST. JEOR: SCORE: 1818.95

## 2021-11-22 NOTE — PROGRESS NOTES
[facility] INITIAL VASCULAR MEDICINE CONSULT      PRIMARY HEALTH CARE PROVIDER:  Augustin Thomas      REFERRING HEALTH CARE PROVIDER;  Hospitalist Service from hospital stay in 5/2021      REASON FOR CONSULT:  Evaluation for timing of IVC filter removal and anticoagulation given history of DVT      HPI: Ron Gilmore is a very pleasant 81 year old male with a history of CVA with residual left-sided weakness, COPD, chronic urinary retention, history of septic shock March 2021 secondary to E. coli bacteremia due to obstructive lumbosacral UV junction stone with hydronephrosis, status post right side percutaneous tube placement and removal and right side stent placement, hyperlipidemia, depression, diabetes mellitus, dysphagia and paroxysmal atrial fibrillation who on May 19, 2021 was found to have a right lower extremity DVT in the peroneal vein. At that time he was having respiratory symptoms and a CT chest PE protocol was negative for PE in the main and lobar arteries, but due to motion artifact it could not accurately assess the segmental and subsegmental pulmonary arteries. He was placed on Lovenox.     Unfortunately, on May 24, 2021 he developed GI bleeding. Lovenox was stopped. EGD was negative. Colonoscopy revealed diverticulosis of the sigmoid colon and 2 sessile polyps, which were not removed. An IVC filter was placed. He was ultimately discharged on Aspirin 81 mg daily to a care facility. GI had mentioned following up with him in the outpatient setting for a possible capsule endoscopy.     He has a history of stroke and had a loop recorder implanted in 1/2020. In 3/2021, when septic, he had paroxysmal atrial fibrillation. He was supposed to follow-up with Dr. Parisi to discuss possible anticoagulation. He has a CHADS2-VASc score of 8 (male, over 75, HTN, DM, CVA, CHF, coronary calcifications on CT). He has yet to follow-up with him.     On 11/8/2021 he was hospitalized  again for possible GI bleed and blood around his chronic chirinos catheter. It was determined that the dark stool was from iron supplements.     He is feeling well and finally got into a more permanent care facility. His PCP has since left and he has not really followed up in the outpatient setting yet. He is wheelchair bound and requires a aleks lift for transfer.     PAST MEDICAL HISTORY  Past Medical History:   Diagnosis Date     BPH (benign prostatic hyperplasia)      Cataract      Cholelithiasis      COPD (chronic obstructive pulmonary disease) (H)      Depression      Diabetes mellitus     Type 2     Dyshidrotic foot dermatitis      Edema      Gout      Hyperlipidemia      Hypertension      Infection due to 2019 novel coronavirus 5/1/2021     Kidney stones     Bladder Stones     Lumbago      Lumbar disc displacement without myelopathy      Muscle weakness      Neuropathy, diabetic (H)      Obesity      Spinal stenosis      Stroke (H)     with residual effects- weakness LUE> LLE     Unsteady gait      Urinary retention with incomplete bladder emptying      UTI (urinary tract infection)      Vasovagal episode        CURRENT MEDICATIONS  acetaminophen (TYLENOL) 325 MG tablet, Take 650 mg by mouth every 4 hours as needed for mild pain as needed for pain/fever Max dose 3000mg/24hr  allopurinol (ZYLOPRIM) 300 MG tablet, Take 300 mg by mouth daily  aspirin (ASA) 81 MG EC tablet, Take 1 tablet (81 mg) by mouth daily  atorvastatin (LIPITOR) 10 MG tablet, Take 10 mg by mouth daily  cyanocobalamin (VITAMIN B-12) 500 MCG SUBL sublingual tablet, Place 1 tablet (500 mcg) under the tongue daily  Emollient (AMLACTIN ULTRA EX), Apply topically daily TO FEET  ferrous sulfate (FEROSUL) 325 (65 Fe) MG tablet, Take 325 mg by mouth daily  furosemide (LASIX) 40 MG tablet, Take 1 tablet (40 mg) by mouth daily  ipratropium-albuterol (COMBIVENT RESPIMAT)  MCG/ACT inhaler, Inhale 1 puff into the lungs 4 times daily 0800, 1200 ,1600  ,2000  metoprolol tartrate (LOPRESSOR) 25 MG tablet, Take 0.5 tablets (12.5 mg) by mouth 2 times daily  pantoprazole (PROTONIX) 40 MG EC tablet, Take 1 tablet (40 mg) by mouth 2 times daily (before meals)  PARoxetine (PAXIL) 30 MG tablet, Take 30 mg by mouth every morning  polyethylene glycol (MIRALAX) 17 GM/Dose powder, Take 17 g by mouth daily (Patient taking differently: Take 17 g by mouth daily as needed )  Skin Protectants, Misc. (CALAZIME SKIN PROTECTANT) PSTE, Externally apply topically 2 times daily Apply to buttocks topically for Excoriation to buttocks/unstageable Apply thin layer to buttocks  Also taking Apply to buttocks topically as needed for excoriation to butocks unstageable with incontinence episode    No current facility-administered medications on file prior to visit.      PAST SURGICAL HISTORY:  Past Surgical History:   Procedure Laterality Date     APPENDECTOMY OPEN       ARTHROSCOPY SHOULDER ROTATOR CUFF REPAIR       cataracts Bilateral      CHOLECYSTECTOMY       COLONOSCOPY  1986     COLONOSCOPY N/A 5/29/2021    Procedure: COLONOSCOPY;  Surgeon: Kofi Davis MD;  Location:  GI     CYSTOSCOPY  10/19/2011    Procedure:CYSTOSCOPY; CYSTOSCOPY, BLADDER STONE REMOVAL; Surgeon:ROB SAWYER; Location: OR     CYSTOSCOPY, TRANSURETHRAL RESECTION (TUR) PROSTATE, COMBINED N/A 2/21/2018    Procedure: COMBINED CYSTOSCOPY, TRANSURETHRAL RESECTION (TUR) PROSTATE;  COMBINED CYSTOSCOPY, TRANSURETHRAL RESECTION (TUR) PROSTATE ;  Surgeon: Rob Sawyer MD;  Location:  OR     EP LOOP RECORDER IMPLANT N/A 1/20/2020    Procedure: EP Loop Recorder Implant;  Surgeon: Evgeny Parisi MD;  Location:  [facility] C[facility] LAB     ESOPHAGOSCOPY, GASTROSCOPY, DUODENOSCOPY (EGD), COMBINED N/A 5/28/2021    Procedure: ESOPHAGOGASTRODUODENOSCOPY (EGD);  Surgeon: Aurora Waterman MD;  Location:  GI     EYE SURGERY      right lid surgery      IR IVC FILTER PLACEMENT  5/24/2021     IR NEPHROSTOMY TUBE  PLACEMENT RIGHT  3/9/2021     IR URETERAL STENT PLACEMENT RIGHT  3/16/2021     JOINT REPLACEMENT Right     HIP     KNEE SURGERY Bilateral      LAMINECTOMY LUMBAR ONE LEVEL       LASER HOLMIUM LITHOTRIPSY URETER(S), INSERT STENT, COMBINED Right 4/14/2021    Procedure: CYSTOSCOPY, BLADDER STONE REMOVAL, RIGHT URETEROSCOPY, HOLMIUM LASER LITHOTRIPSY, AND RIGHT STENT REMOVAL, RIGHT RETROGRADE;  Surgeon: Rob Sullivan MD;  Location: [facility] OR     TONSILLECTOMY         ALLERGIES   No Known Allergies    FAMILY HISTORY  Family History   Problem Relation Age of Onset     Prostate Cancer Father        SOCIAL HISTORY  Non-smoker. Lives in a care facility. Daughter with him today. He's .     ROS:   General: No change in weight, sleep or appetite.  Normal energy.  No fever or chills  Eyes: Negative for vision changes or eye problems  ENT: No problems with ears, nose or throat.  No difficulty swallowing.  Resp: No coughing, wheezing or shortness of breath  CV: No chest pains or palpitations  GI: No nausea, vomiting,  heartburn, abdominal pain, diarrhea, constipation or change in bowel habits. + GI bleed 5/2021.   : No urinary frequency or dysuria, bladder or kidney problems  Musculoskeletal: No significant muscle or joint pains. Left hemiplegia.   Neurologic: No headaches, numbness, tingling. Left hemiplegia from prior stroke.   Psychiatric: No problems with anxiety, depression or mental health  Heme/immune/allergy:  + RLE DVT in 5/2021.  No allergies or immune system problems  Endocrine: No history of thyroid disease, diabetes or other endocrine disorders  Skin: No rashes,worrisome lesions or skin problems  Vascular:  No claudication, lifestyle limiting or otherwise; no ischemic rest pain; no non-healing ulcers. No weakness, No loss of sensation     EXAM:  BP (!) 89/63 (BP Location: Right arm, Patient Position: Chair, Cuff Size: Adult Large)   Pulse 101   Resp 18   Ht 1.829 m (6')   Wt 106.6 kg (235 lb)   SpO2  99%   BMI 31.87 kg/m    In general, the patient is a pleasant male in no apparent distress.    HEENT: NC/AT.  PERRLA.  EOMI.  Sclerae white, not injected.  Nares clear.  Pharynx without erythema or exudate.  Heart: RRR. Normal S1, S2 splits physiologically. No murmur, rub, click, or gallop.   Lungs: CTA.  No ronchi, wheezes, rales.    Abdomen: Soft, nontender, nondistended.   Extremities: No clubbing, cyanosis, or edema. No wounds. Left sided hemiplegia.      Labs:    LIVER ENZYME RESULTS:  Lab Results   Component Value Date    AST 10 11/09/2021    AST 19 05/30/2021    ALT 14 11/09/2021    ALT 12 05/30/2021       CBC RESULTS:  Lab Results   Component Value Date    WBC 9.8 11/09/2021    WBC 7.7 06/30/2021    RBC 3.29 (L) 11/09/2021    RBC 3.33 (L) 06/30/2021    HGB 10.2 (L) 11/10/2021    HGB 9.6 (L) 06/30/2021    HCT 33.5 (L) 11/09/2021    HCT 32.8 (L) 06/30/2021     (H) 11/09/2021    MCV 99 06/30/2021    MCH 31.0 11/09/2021    MCH 28.8 06/30/2021    MCHC 30.4 (L) 11/09/2021    MCHC 29.3 (L) 06/30/2021    RDW 16.9 (H) 11/09/2021    RDW 17.8 (H) 06/30/2021     11/09/2021     06/30/2021       BMP RESULTS:  Lab Results   Component Value Date     11/09/2021     06/30/2021    POTASSIUM 4.0 11/09/2021    POTASSIUM 3.8 06/30/2021    CHLORIDE 103 11/09/2021    CHLORIDE 100 06/30/2021    CO2 34 (H) 11/09/2021    CO2 34 (H) 06/30/2021    ANIONGAP 1 (L) 11/09/2021    ANIONGAP 3 06/30/2021     (H) 11/09/2021    GLC 88 06/30/2021    BUN 22 11/09/2021    BUN 18 06/30/2021    CR 0.84 11/09/2021    CR 0.98 06/30/2021    GFRESTIMATED 83 11/09/2021    GFRESTIMATED 73 06/30/2021    GFRESTBLACK 84 06/30/2021    RAJ 8.8 11/09/2021    RAJ 9.1 06/30/2021        A1C RESULTS:  Lab Results   Component Value Date    A1C 6.0 (H) 05/11/2021       THYROID RESULTS:  Lab Results   Component Value Date    TSH 1.09 11/09/2021    TSH 1.95 05/18/2021       Procedures:   ULTRASOUND VENOUS LOWER EXTREMITY  BILATERAL   5/19/2021 3:49 PM      HISTORY: Positive d-dimer. COVID  +     COMPARISON: None.     TECHNIQUE: Ultrasound gray scale, Color Doppler flow, and spectral  Doppler waveform analysis performed.     FINDINGS: The right common femoral, deep profunda femoral, right  femoral and right popliteal veins are compressible with no evidence of  DVT. The right peroneal vein is noncompressible thrombus is  identified. Findings consistent with calf DVT.     The left common femoral, superficial femoral, popliteal and  segmentally visualized calf veins are patent and fully compressible  and demonstrate normal venous Doppler flow      The visualized greater saphenous veins are negative for thrombus.                                                                       IMPRESSION:   Thrombus identified right peroneal vein, findings consistent with  right calf DVT.    Assessment and Plan:   1.First lifetime thromboembolic event of right lower extremity DVT in 5/2021    -Likely provoked by immobility and hospitalizations.   -Developed GI bleed a few days after starting anticoagulation so this was discontinued and he was placed on Aspirin instead. IVC filter was subsequently placed on 5/31/21.     Plan:   -Continue aspirin.   -Wear compression stockings (20-30 mmHg knee high) during the day. Take them off at night.   -Get a repeat venous duplex of the right lower extremity to evaluate for progression versus resolution of thrombus as he has been off anticoagulation. Follow-up with me after.   -Will determine timing of IVC filter removal based on venous duplex results as well as recommendations on anticoagulation after he sees consultants below.      2. Paroxysmal Atrial Fibrillation    -This was noted when he was septic in 3/2021.   -He had a loop recorder implanted in 1/2020.  -He has a CHADS2-VASc score of 8 (male, over 75, HTN, DM, CVA, CHF, coronary calcifications on CT).  -Follow-up with Dr. Parisi of Electrophysiology to  determine if anticoagulation would be desired for paroxysmal atrial fibrillation.      3. GI bleed in 5/2021 after initiating anticoagulation     -EGD and colonoscopy with no clear source.   -GI mentioned following up for small bowel capsule endoscopy as outpatient - he has yet to do this.   -Follow up with Dr. Davis of Gastroenterology to determine if small bowel capsule endoscopy is needed and get clearance for anticoagulation if needed.         Ilda Goodrich PA-C      65 minutes spent on the date of the encounter doing chart review, history and exam, documentation, and further activities as noted above.

## 2021-11-22 NOTE — PATIENT INSTRUCTIONS
1. Continue aspirin for now.     2. Wear compression stockings (20-30 mmHg knee high) during the day. Take them off at night.     3. Follow-up with Dr. Parisi of Electrophysiology to determine if anticoagulation would be desired for paroxysmal atrial fibrillation.     4. Follow up with Dr. Davis of Gastroenterology to determine if small bowel capsule endoscopy is needed and get clearance for anticoagulation if needed.     5. Get an ultrasound of your right leg to re-evaluate your clot. Follow-up with me right after.      6. Please call us with any questions or concerns ([phone]).

## 2021-11-22 NOTE — PROGRESS NOTES
[facility]        Patient is here for a consult to discuss about IVC filter/DVT       BP (!) 89/63 (BP Location: Right arm, Patient Position: Chair, Cuff Size: Adult Large)   Pulse 101   Resp 18   Ht 6' (1.829 m)   Wt 235 lb (106.6 kg)   SpO2 99%   BMI 31.87 kg/m      The provider has been notified that the patient has no concerns.     Questions patient would like addressed today are: N/A.    Refills are needed: No    Has homecare services and agency name:  Rosa Hensley, [facility]

## 2021-11-24 ENCOUNTER — TELEPHONE (OUTPATIENT)
Dept: OTHER | Facility: CLINIC | Age: 81
End: 2021-11-24
Payer: MEDICARE

## 2021-11-24 ENCOUNTER — TRANSFERRED RECORDS (OUTPATIENT)
Dept: HEALTH INFORMATION MANAGEMENT | Facility: CLINIC | Age: 81
End: 2021-11-24
Payer: MEDICARE

## 2021-11-24 NOTE — TELEPHONE ENCOUNTER
RLE Venous US will be done at facility.  Results will be faxed to [facility] attn:  MILDRED Lambert RN BSN  [facility]  [phone]

## 2021-11-24 NOTE — TELEPHONE ENCOUNTER
[facility]    Who is the name of the provider?:  Joleen      What is the location you see this provider at?: [place]    Reason for call:   Has question re an order from the 11/22 visit    Can we leave a detailed message on this number?  YES ask for a nurse

## 2021-11-24 NOTE — TELEPHONE ENCOUNTER
Follow up to 11/22/21        RLE Venous US (Will require Terrie Lift)    In clinic visit with Ilda Goodrich PA-C same day following US.    Ramya Cid RN BSN  [facility]  [phone]

## 2021-11-24 NOTE — TELEPHONE ENCOUNTER
Called Maikol Amin (who arranges all of Ron's appointments).  She is going to schedule an appointment with Dr. Parisi and Dr. Davis (as suggested by Ilda Goodrich PA-C at office visit 11/23/21).  When these appointments are scheduled, she will call us back to arrange for a venous US (in the hospital) and a follow up the same day with MILDRED Lambert RN BSN  [facility]  [phone]

## 2021-11-29 NOTE — TELEPHONE ENCOUNTER
RL Venous US done 11/24/21 at patient's facility via [facility] revealed No DVT in RLE.    Left copy of report on Ilda Goodrich's desk for review.    Ramya Cid RN BSN  [facility]  [phone]

## 2021-12-01 ENCOUNTER — TELEPHONE (OUTPATIENT)
Dept: OTHER | Facility: CLINIC | Age: 81
End: 2021-12-01
Payer: MEDICARE

## 2021-12-01 NOTE — TELEPHONE ENCOUNTER
----- Message from Zully Walters sent at 10/20/2021  1:03 PM CDT -----  LM with wife for Ron to call us to schedule.  Also made encounter so if he calls back anyone can help him schedule.     VG  ----- Message -----  From: María Beverly RN  Sent: 10/20/2021  12:04 PM CDT  To: Keiry Michele MA, Roma Lai, #    Could we call him one more time to establish care with Dr. Jurado or Ilda?  After that we are done.    AC management and IVC filter    Thanks Nisreen

## 2021-12-29 ENCOUNTER — ANCILLARY PROCEDURE (OUTPATIENT)
Dept: CARDIOLOGY | Facility: CLINIC | Age: 81
End: 2021-12-29
Attending: INTERNAL MEDICINE
Payer: COMMERCIAL

## 2021-12-29 DIAGNOSIS — Z45.09 ENCOUNTER FOR LOOP RECORDER CHECK: ICD-10-CM

## 2021-12-29 DIAGNOSIS — I63.9 CEREBROVASCULAR ACCIDENT (CVA), UNSPECIFIED MECHANISM (H): ICD-10-CM

## 2021-12-29 PROCEDURE — 93297 REM INTERROG DEV EVAL ICPMS: CPT | Performed by: INTERNAL MEDICINE

## 2021-12-29 PROCEDURE — G2066 INTER DEVC REMOTE 30D: HCPCS | Performed by: INTERNAL MEDICINE

## 2022-01-11 LAB
MDC_IDC_EPISODE_DTM: NORMAL
MDC_IDC_EPISODE_DURATION: 120 S
MDC_IDC_EPISODE_ID: 19
MDC_IDC_EPISODE_TYPE: NORMAL
MDC_IDC_MSMT_BATTERY_STATUS: NORMAL
MDC_IDC_PG_IMPLANT_DTM: NORMAL
MDC_IDC_PG_MFG: NORMAL
MDC_IDC_PG_MODEL: NORMAL
MDC_IDC_PG_SERIAL: NORMAL
MDC_IDC_PG_TYPE: NORMAL
MDC_IDC_SESS_CLINIC_NAME: NORMAL
MDC_IDC_SESS_DTM: NORMAL
MDC_IDC_SESS_TYPE: NORMAL
MDC_IDC_SET_ZONE_DETECTION_INTERVAL: 2000 MS
MDC_IDC_SET_ZONE_DETECTION_INTERVAL: 3000 MS
MDC_IDC_SET_ZONE_DETECTION_INTERVAL: 390 MS
MDC_IDC_SET_ZONE_TYPE: NORMAL
MDC_IDC_STAT_AT_BURDEN_PERCENT: 0 %
MDC_IDC_STAT_AT_DTM_END: NORMAL
MDC_IDC_STAT_AT_DTM_START: NORMAL
MDC_IDC_STAT_EPISODE_RECENT_COUNT: 0
MDC_IDC_STAT_EPISODE_RECENT_COUNT: 2
MDC_IDC_STAT_EPISODE_RECENT_COUNT_DTM_END: NORMAL
MDC_IDC_STAT_EPISODE_RECENT_COUNT_DTM_START: NORMAL
MDC_IDC_STAT_EPISODE_TOTAL_COUNT: 0
MDC_IDC_STAT_EPISODE_TOTAL_COUNT: 1
MDC_IDC_STAT_EPISODE_TOTAL_COUNT: 18
MDC_IDC_STAT_EPISODE_TOTAL_COUNT_DTM_END: NORMAL
MDC_IDC_STAT_EPISODE_TOTAL_COUNT_DTM_START: NORMAL
MDC_IDC_STAT_EPISODE_TYPE: NORMAL

## 2022-01-13 ENCOUNTER — HOSPITAL ENCOUNTER (EMERGENCY)
Facility: CLINIC | Age: 82
Discharge: OTHER INSTITUTION | End: 2022-01-13
Attending: EMERGENCY MEDICINE | Admitting: EMERGENCY MEDICINE
Payer: COMMERCIAL

## 2022-01-13 VITALS
OXYGEN SATURATION: 89 % | SYSTOLIC BLOOD PRESSURE: 136 MMHG | TEMPERATURE: 98.3 F | DIASTOLIC BLOOD PRESSURE: 78 MMHG | RESPIRATION RATE: 18 BRPM | HEART RATE: 89 BPM

## 2022-01-13 DIAGNOSIS — T83.511A URINARY TRACT INFECTION ASSOCIATED WITH INDWELLING URETHRAL CATHETER, INITIAL ENCOUNTER (H): ICD-10-CM

## 2022-01-13 DIAGNOSIS — Z46.6 URINARY CATHETER (FOLEY) CHANGE REQUIRED: ICD-10-CM

## 2022-01-13 DIAGNOSIS — N39.0 URINARY TRACT INFECTION ASSOCIATED WITH INDWELLING URETHRAL CATHETER, INITIAL ENCOUNTER (H): ICD-10-CM

## 2022-01-13 LAB
ALBUMIN UR-MCNC: 30 MG/DL
APPEARANCE UR: CLEAR
BILIRUB UR QL STRIP: NEGATIVE
COLOR UR AUTO: ABNORMAL
GLUCOSE UR STRIP-MCNC: NEGATIVE MG/DL
HGB UR QL STRIP: ABNORMAL
KETONES UR STRIP-MCNC: NEGATIVE MG/DL
LEUKOCYTE ESTERASE UR QL STRIP: ABNORMAL
NITRATE UR QL: NEGATIVE
PH UR STRIP: 7.5 [PH] (ref 5–7)
RBC URINE: 15 /HPF
SP GR UR STRIP: 1.02 (ref 1–1.03)
UROBILINOGEN UR STRIP-MCNC: NORMAL MG/DL
WBC URINE: 56 /HPF

## 2022-01-13 PROCEDURE — 99284 EMERGENCY DEPT VISIT MOD MDM: CPT | Mod: 25

## 2022-01-13 PROCEDURE — 51702 INSERT TEMP BLADDER CATH: CPT

## 2022-01-13 PROCEDURE — 87086 URINE CULTURE/COLONY COUNT: CPT | Performed by: EMERGENCY MEDICINE

## 2022-01-13 PROCEDURE — 250N000013 HC RX MED GY IP 250 OP 250 PS 637: Performed by: EMERGENCY MEDICINE

## 2022-01-13 PROCEDURE — 81001 URINALYSIS AUTO W/SCOPE: CPT | Performed by: EMERGENCY MEDICINE

## 2022-01-13 RX ORDER — CEPHALEXIN 500 MG/1
500 CAPSULE ORAL 3 TIMES DAILY
Qty: 21 CAPSULE | Refills: 0 | Status: SHIPPED | OUTPATIENT
Start: 2022-01-13 | End: 2022-01-20

## 2022-01-13 RX ORDER — LIDOCAINE HYDROCHLORIDE 20 MG/ML
JELLY TOPICAL
Status: DISCONTINUED
Start: 2022-01-13 | End: 2022-01-13 | Stop reason: HOSPADM

## 2022-01-13 RX ORDER — CEPHALEXIN 500 MG/1
1000 CAPSULE ORAL ONCE
Status: COMPLETED | OUTPATIENT
Start: 2022-01-13 | End: 2022-01-13

## 2022-01-13 RX ADMIN — CEPHALEXIN 1000 MG: 500 CAPSULE ORAL at 14:46

## 2022-01-13 ASSESSMENT — ENCOUNTER SYMPTOMS: ABDOMINAL PAIN: 1

## 2022-01-13 NOTE — ED PROVIDER NOTES
"  History   Chief Complaint:  Urinary Retention       HPI   Ron Gilmore is a 81 year old male with a history of diabetes, sepsis, hypertension, with an indwelling chirinos catheter secondary to back surgery and a stroke with left sided deficits who presents due to concern by his home health nurse. He reports that his nurse came to flush his catheter and noticed a significant amount of sediment. While doing this the patient mentioned some pain he describes \"like she was pushing a spear\". The patient mentions that she flushed his catheter two days ago and it went well. Of note, patient had a urine culture obtained in November of 2021 which revealed proteus mirabilis which was widely susceptible with the exception of nitrofurantoin.     Review of Systems   Gastrointestinal: Positive for abdominal pain.   Genitourinary: Positive for penile pain.   All other systems reviewed and are negative.    Allergies:  The patient has no known allergies.     Medications:  Aspirin 81 mg  Lasix  Lopressor  Protonix  Zyloprim  Lipitor  Combivent  Paxil    Past Medical History:     BPH  Cataract  Cholelithiasis  COPD  Depression  Diabetes  Gout  Hyperlipidemia  Hypertension  Covid-19  Lumbar disc displacement  Diabetic neuropathy  Spinal stenosis  Stroke  SIRS  CAP  Severe sepsis  Acute and chronic respiratory failure with hypoxia  Hemiparesis affecting left side  DVT  GI hemorrhage    Past Surgical History:    Appendectomy  Arthroscopy shoulder rotator cuff repair  Cholecystectomy  Colonoscopy x2  Cystoscopy  Cystoscopy transurethral resection   Loop recorder implant  EGD combined  Lower lid lateral tarsal strip  IR IVC filter placement  Nephrostomy tube placement  Ureteral stent placement  Hip replacement  Knee surgery  Laminectomy lumbar  Laser holmium lithotripsy insert stent  Tonsillectomy    Family History:    Father: Prostate cancer    Social History:  The patient presents to the ED alone.     Physical Exam     Patient Vitals for " "CC: hands trembling/tingling and nosebleeds    History:  Remigio Ho is a 20 y.o. female who presents today for evaluation of the above problems.  Complains of hand trembling for the last 3-4 years.  Is not on any medicine currently. Feels like pins and needles in her hands. Had two nose bleeds this past weekend and presented at the ER. She states that she had sinus pain and pressure in her frontal sinuses and this was followed by epistaxis and a headache.   She was told to take 7 days of zyrtec and was started on Flonase.  Her hand trembling and paresthesia has been worse since her nosebleed.  She does admit to being somewhat anxious and stressed lately as well.    ROS:  Review of Systems   HENT:        Two nose bleeds this past week   Neurological: Positive for tremors (of both hands) and headaches.   Psychiatric/Behavioral: The patient is nervous/anxious.        No Known Allergies  Past Medical History:   Diagnosis Date   • Biliary dyskinesia    • Obesity      Past Surgical History:   Procedure Laterality Date   • CHOLECYSTECTOMY     • WISDOM TOOTH EXTRACTION  07/18/2017     Family History   Problem Relation Age of Onset   • Alcohol abuse Mother    • Hypertension Mother    • Alcohol abuse Father       reports that she has never smoked. She has never used smokeless tobacco. She reports that she does not drink alcohol or use drugs.      Current Outpatient Prescriptions:   •  fluticasone (FLONASE) 50 MCG/ACT nasal spray, 2 sprays into each nostril Daily., Disp: 1 bottle, Rfl: 0    OBJECTIVE:  /78 (BP Location: Left arm, Patient Position: Sitting, Cuff Size: Adult)   Pulse 78   Temp 98.5 °F (36.9 °C) (Oral)   Resp 16   Ht 170.2 cm (67\")   Wt 107 kg (236 lb 11.2 oz)   SpO2 99%   BMI 37.07 kg/m²    Physical Exam   Constitutional: She is oriented to person, place, and time. Vital signs are normal. She appears well-developed and well-nourished.   Cardiovascular: Normal rate.    Pulmonary/Chest: Effort " normal.   Musculoskeletal:   Positive Phalen's test.   Neurological: She is alert and oriented to person, place, and time.   Mild tremors noted in both hands.    Psychiatric: She has a normal mood and affect. Her behavior is normal.   Vitals reviewed.      Assessment/Plan    Diagnoses and all orders for this visit:    Numbness and tingling in both hands  Occasional tremors  -     Ferritin; Future  -     Vitamin B12; Future  -     Folate; Future  -     CBC (No Diff); Future  -     Comprehensive Metabolic Panel; Future  -     Vitamin D 25 hydroxy; Future  -     Iron Profile; Future  There are multiple possible differentials which include carpal tunnel, vitamin deficiency, anemia, and anxiety.  Will evaluate lab work initially and proceed as indicated.    Acute seasonal allergic rhinitis due to other allergen  -     fluticasone (FLONASE) 50 MCG/ACT nasal spray; 2 sprays into each nostril Daily.  I suspect that her nose bleeds are in no way related to the tremors and tingling in her hands.  I agree with the ER treatment of Flonase ordered and have encouraged her to continue this.    An After Visit Summary was printed and given to the patient at discharge.  Return for Next scheduled follow up.         SUNIL Duff 3/26/2016    the past 24 hrs:   BP Temp Temp src Pulse Resp SpO2   01/13/22 1339 -- -- -- -- -- (!) 89 %   01/13/22 1336 -- 98.3  F (36.8  C) Temporal -- 18 --   01/13/22 1241 -- -- -- -- -- 93 %   01/13/22 1240 136/78 -- -- 89 -- --       Physical Exam  General: Resting uncomfortably on the gurney. Markedly elevated BMI.  Head:  The scalp, face, and head appear normal  Eyes:  The pupils are equal, round, and reactive to light    There is no nystagmus    Extraocular muscles are intact    Conjunctivae and sclerae are normal  ENT:    The nose is normal    Pinnae are normal    The oropharynx is normal    Uvula is in the midline  Neck:  Normal range of motion    There is no rigidity noted    There is no midline cervical spine pain/tenderness    Trachea is in the midline    No mass is detected  CV:  Regular rate and underlying rhythm     Normal S1/S2, no S3/S4    No pathological murmur detected  Resp:  Lungs are clear    There is no tachypnea    Non-labored    No rales    No wheezing   GI:  Abdomen is soft, there is no rigidity    There is mild discomfort with suprapubic palpation. The remainder of the abdomen is benign    No distension    No tympani    No rebound tenderness     Non-surgical without peritoneal features  :   Penis is normal. There is some enlargement of the inferior urethral meatus from chronic indwelling chirinos. Circumcised. Testicles are normal bilaterally. Scrotum is normal  MS:  Normal muscular tone    Symmetric motor strength    No major joint effusions    No asymmetric leg swelling, no calf tenderness  Skin:  No rash or acute skin lesions noted  Neuro: Speech is normal and fluent. The patient has a chronic left hemiplegia.   Psych:  Awake. Alert.      Normal affect.  Appropriate interactions.    Emergency Department Course     Imaging:  POC US ABDOMEN LIMITED   Final Result   PROCEDURE: Limited Point of Care Bedside ED Ultrasound of the Urinary Bladder      PERFORMED BY: Dr. Augustin Santos      INDICATION:  Suprapubic pain, Possible obstrucion      PROBE: Low frequency convex probe   BODY LOCATION: Abdomen, Suprapubic   FINDINGS: Visualization of the bladder in longitudinal and transverse views demonstrated a very mildly distended state.        INTERPRETATION: The evaluation was consistent with mild urine accumulation but no evidence of acute severe urinary retention.  The ultrasound machine was unable to connect to the PACS system for some reason, therefore I was unable to save these images.      IMAGE DOCUMENTATION: No images are able to be saved for this study.  Real-time images were obtained demonstrating some urine in the bladder without evidence of severe urinary retention.  A Cardona catheter will be placed nonetheless.        Report per myself    Laboratory:  Labs Ordered and Resulted from Time of ED Arrival to Time of ED Departure   ROUTINE UA WITH MICROSCOPIC REFLEX TO CULTURE - Abnormal       Result Value    Color Urine Light Yellow      Appearance Urine Clear      Glucose Urine Negative      Bilirubin Urine Negative      Ketones Urine Negative      Specific Gravity Urine 1.019      Blood Urine Trace (*)     pH Urine 7.5 (*)     Protein Albumin Urine 30  (*)     Urobilinogen Urine Normal      Nitrite Urine Negative      Leukocyte Esterase Urine Large (*)     RBC Urine 15 (*)     WBC Urine 56 (*)    URINE CULTURE   URINE CULTURE      Emergency Department Course:    Reviewed:  I reviewed nursing notes, vitals, past medical history and Care Everywhere    Assessments:  1239 I obtained history and examined the patient as noted above.     1251 I rechecked the patient and performed bedside ultrasound.     1343 I rechecked the patient prior to discharge.     Disposition:  The patient was discharged to home.     Impression & Plan     Medical Decision Making:  This patient presents with a history of chronic indwelling urethral Cardona catheter, and history of urinary tract infections in the past, with need for catheter  insertion since his nurse could not get the catheter changed in the outpatient setting.  The catheter is usually changed monthly.  The patient presented with only a small amount of urine noted in the bladder.  There was some pyuria and microhematuria.  This could be from chronic colonization.  Patient had a urine culture obtained.  He has had urinary tract infection in the past with a myriad of organisms.  I have seen E. coli as well as Proteus mirabilis.  The patient was given Keflex 1000 mg orally to be on the safe side.  He is not having symptoms at this time.  He will be continued on Keflex for 1 week pending culture.  The patient does not have fevers, chills, sweats or other systemic features of infection.  The main purpose of the visit was to get a catheter in place.  There was no gross urinary retention as he was sent to the emergency department relatively quickly today, and he had a liter of urine in his bag which accumulated overnight indicating that there had been adequate drainage.  The patient's oxygen saturations run around 90 to 91%.  He is known to have chronic emphysema, COPD, and sleep apnea with chronic hypoxia.  There is no acute change.  Patient reportedly has access to oxygen at home when needed.    Diagnosis:    ICD-10-CM    1. Urinary tract infection associated with indwelling urethral catheter, initial encounter (H)  T83.511A     N39.0    2. Urinary catheter (Cardona) change required  Z46.6        Discharge Medications:  New Prescriptions    CEPHALEXIN (KEFLEX) 500 MG CAPSULE    Take 1 capsule (500 mg) by mouth 3 times daily for 7 days     Scribe Disclosure:  I, Huy Hernandez, am serving as a scribe at 12:39 PM on 1/13/2022 to document services personally performed by Augustin Santos MD based on my observations and the provider's statements to me.             Augustin Santos MD  01/13/22 5760

## 2022-01-13 NOTE — ED TRIAGE NOTES
Pt nurse was flushing catheter today and there was a lot of sediment.  Nurse was replacing catheter and was unable to do so.  Pt sent here.

## 2022-01-13 NOTE — DISCHARGE INSTRUCTIONS
Discharge Instructions  Urinary Tract Infection  You or your child have been diagnosed with a urinary tract infection, or UTI. The urinary tract includes the kidneys (which make urine/pee), ureters (the tubes that carry urine/pee from the kidneys to the bladder), the bladder (which stores urine/pee), and urethra (the tube that carries urine/pee out of the bladder). Urinary tract infections occur when bacteria travel up the urethra into the bladder (bladder infection) and, in some cases, from there into the kidneys (kidney infection).  Generally, every Emergency Department visit should have a follow-up clinic visit with either a primary or a specialty clinic/provider. Please follow-up as instructed by your emergency provider today.  Return to the Emergency Department if:  You or your child have severe back pain.  You or your child are vomiting (throwing up) so that you cannot take your medicine.  You or your child have a new fever (had not previously had a fever) over 101 F.  You or your child have confusion or are very weak, or feel very ill.  Your child seems much more ill, will not wake up, will not respond right, or is crying for a long time and will not calm down.  You or your child are showing signs of dehydration. These signs may include decreased urination (pee), dry mouth/gums/tongue, or decreased activity.    Follow-up with your provider:   Children under 24 months need to be seen by their regular provider within one week after a diagnosis of a UTI. It may be necessary to do some more tests to look at the child s kidney or bladder.  You should begin to feel better within 24 - 48 hours of starting your antibiotic; follow-up with your regular clinic/doctor/provider if this is not the case.    Treatment:   You will be treated with an antibiotic to kill the bacteria. We have to make an educated guess, based on what we know about common bacteria and antibiotics, as to which antibiotic will work for your  "infection. We will be correct most times but there will be some cases where the antibiotic chosen is not correct (see urine cultures below).  Take a pain medication such as acetaminophen (Tylenol ) or ibuprofen (Advil , Motrin , Nuprin ).  Phenazopyridine (Pyridium , Uristat ) is a prescription medication that numbs the bladder to reduce the burning pain of some UTIs.  The same medication is available in a non-prescription version (Azo-Standard , Urodol ). This medication will change the color of the urine and tears (usually blue or orange). If you wear contacts, do not wear them while taking this medication as they may be stained by the medication.    Urine Cultures:  If indicated, a urine culture may have been performed today. This test generally takes 24-48 hours to complete so the results are not known at this time. The results can confirm that an infection is present but also determine which antibiotic is effective for the specific bacteria that is causing the infection. If your urine culture shows that the antibiotic you were given today will not work to treat your infection, we will attempt to contact you to make arrangements to change the antibiotic. If the culture confirms that the antibiotic is effective for your infection, you will not be contacted. We often recommend follow-up with your regular physician/provider on the culture results regardless of this process.    Antibiotic Warning:   If you have been placed on antibiotics - watch for signs of allergic reaction.  These include rash, lip swelling, difficulty breathing, wheezing, and dizziness.  If you develop any of these symptoms, stop the antibiotic immediately and go to an emergency room or urgent care for evaluation.    Probiotics: If you have been given an antibiotic, you may want to also take a probiotic pill or eat yogurt with live cultures. Probiotics have \"good bacteria\" to help your intestines stay healthy. Studies have shown that probiotics " help prevent diarrhea and other intestine problems (including C. diff infection) when you take antibiotics. You can buy these without a prescription in the pharmacy section of the store.   If you were given a prescription for medicine here today, be sure to read all of the information (including the package insert) that comes with your prescription.  This will include important information about the medicine, its side effects, and any warnings that you need to know about.  The pharmacist who fills the prescription can provide more information and answer questions you may have about the medicine.  If you have questions or concerns that the pharmacist cannot address, please call or return to the Emergency Department.   Remember that you can always come back to the Emergency Department if you are not able to see your regular provider in the amount of time listed above, if you get any new symptoms, or if there is anything that worries you.        Take Keflex 500 mg 3 times a day for 7 days  If an antibiotic change is indicated, our culture nurse will notify you.

## 2022-01-14 LAB — BACTERIA UR CULT: NORMAL

## 2022-02-15 NOTE — TELEPHONE ENCOUNTER
Please arrange for the following in April:       RLE Venous US at [facility] (Patient is in a wheelchair and will need a Terrie Lift)    Follow up same day right after with MILDRED Lambert arranges all of Ron's appointments [phone].    Ramya Cid RN BSN  [facility]  [phone]

## 2022-02-15 NOTE — TELEPHONE ENCOUNTER
LM for patient to call us back. Pt needs to be scheduled in [place] with Terrie Mendiola and to see DARYL Lambert same day.

## 2022-02-17 NOTE — TELEPHONE ENCOUNTER
Call 2 of 2    Left Voicemail February 17, 2022 , 9:23 AM    Adonis Bowden I   [facility]  [email or web address]  [phone]

## 2022-03-28 ENCOUNTER — HOSPITAL ENCOUNTER (EMERGENCY)
Facility: CLINIC | Age: 82
Discharge: HOME | End: 2022-03-28
Attending: EMERGENCY MEDICINE | Admitting: EMERGENCY MEDICINE
Payer: MEDICARE

## 2022-03-28 VITALS
OXYGEN SATURATION: 93 % | SYSTOLIC BLOOD PRESSURE: 116 MMHG | RESPIRATION RATE: 16 BRPM | TEMPERATURE: 99.3 F | HEIGHT: 72 IN | HEART RATE: 89 BPM | DIASTOLIC BLOOD PRESSURE: 49 MMHG | BODY MASS INDEX: 32.51 KG/M2 | WEIGHT: 240 LBS

## 2022-03-28 DIAGNOSIS — T83.091A OBSTRUCTION OF FOLEY CATHETER, INITIAL ENCOUNTER (H): ICD-10-CM

## 2022-03-28 PROCEDURE — 51702 INSERT TEMP BLADDER CATH: CPT

## 2022-03-28 PROCEDURE — 99283 EMERGENCY DEPT VISIT LOW MDM: CPT

## 2022-03-28 ASSESSMENT — ENCOUNTER SYMPTOMS
FEVER: 0
ABDOMINAL PAIN: 1
DIFFICULTY URINATING: 1

## 2022-03-28 NOTE — ED TRIAGE NOTES
Pt with chirinos catheter that hasnt been draining since midnight. Was supposed to have it flushed friday but it was missed.

## 2022-03-28 NOTE — ED PROVIDER NOTES
History   Chief Complaint:  Catheter Problem (Pt with chirinos catheter that hasnt been draining since midnight. Was supposed to have it flushed friday but it was missed.)       THUY Gilmore is a 81 year old male with history of diabetes, sepsis, hypertension, with an indwelling chirinos catheter secondary to back surgery, and a stroke with left sided deficits who presents with catheter problem.  Patient has a chirinos catheter that has not been draining since midnight. It was supposed to have it flushed friday but it was missed. Here, patient reports urgency to urinate with no output. Patient is on 2L O2 at home.     Review of Systems   Constitutional: Negative for fever.   Gastrointestinal: Positive for abdominal pain.   Genitourinary: Positive for difficulty urinating and urgency.   All other systems reviewed and are negative.    Allergies:  No Known Allergies    Medications:  allopurinol  aspirin (ASA) 81 MG EC tablet  atorvastatin   Emollient   furosemide   ipratropium-albuterol  inhaler  metoprolol tartrate  pantoprazole   PARoxetine   polyethylene glycol     Past Medical History:     BPH  Cataract  Cholelithiasis  COPD  Depression  Diabetes  Gout  Hyperlipidemia  Hypertension  Covid-19  Lumbar disc displacement  Diabetic neuropathy  Spinal stenosis  Stroke  SIRS  CAP  Severe sepsis  Acute and chronic respiratory failure with hypoxia  Hemiparesis affecting left side  DVT  GI hemorrhage      Past Surgical History:    Appendectomy  Arthroscopy shoulder rotator cuff repair  Cholecystectomy  Colonoscopy x2  Cystoscopy  Cystoscopy transurethral resection   Loop recorder implant  EGD combined  Lower lid lateral tarsal strip  IR IVC filter placement  Nephrostomy tube placement  Ureteral stent placement  Hip replacement  Knee surgery  Laminectomy lumbar  Laser holmium lithotripsy insert stent  Tonsillectomy     Family History:    Father: Prostate cancer    Social History:  Presents alone.    Physical Exam     Patient  Vitals for the past 24 hrs:   BP Temp Temp src Pulse Resp SpO2 Height Weight   03/28/22 0236 116/49 99.3  F (37.4  C) Oral 89 16 93 % 1.829 m (6') 108.9 kg (240 lb)       Physical Exam  General:  Alert, nontoxic in appearance  CV:  Appears well perfused  Lungs:  No obvious respiratory distress  Abd:  Soft, discomfort in suprapubic region  Neuro:  Speaking clearly, no slurred speech  MSK:  Ambulatory      Emergency Department Course     Emergency Department Course:    Mental Health Risk Assessment      PSS-3    Date and Time Over the past 2 weeks have you felt down, depressed, or hopeless? Over the past 2 weeks have you had thoughts of killing yourself? Have you ever attempted to kill yourself? When did this last happen? User   03/28/22 0255 no no yes -- RK              Reviewed:  I reviewed nursing notes, vitals, past medical history and Care Everywhere    Assessments:  0240 I obtained history and examined the patient as noted above.   0300 I rechecked the patient and explained findings.     Disposition:  The patient was discharged to home.     Impression & Plan     Medical Decision Making:  Ron Gilmore is a 81-year-old gentleman who presents due to Cardona catheter not draining.  He apparently typically has it flushed a few times a week, but the last flush had excellently got missed.  He had the urge to go but has not been having any output.  We were unable to flush the Cardona catheter so we removed the old one and placed a new one.  With this the patient had complete resolution of his symptoms and felt much better.  I did not feel that a UA was necessary as he is not having any infectious type symptoms and UA would likely show chronic contamination.  Patient was discharged back home with recreation for continued Cardona catheter care.    Diagnosis:    ICD-10-CM    1. Obstruction of Cardona catheter, initial encounter (H)  T83.091A        Discharge Medications:  New Prescriptions    No medications on file       Scribe  Disclosure:  I, Anna Verdin, am serving as a scribe at 2:40 AM on 3/28/2022 to document services personally performed by Anatoliy Carrillo MD based on my observations and the provider's statements to me.          Anatoliy Carrillo MD  03/28/22 0504

## 2022-03-28 NOTE — ED NOTES
Spoke with SOFIA Baron at [facility]. Aware catheter has been changed out. No labs or meds done.

## 2022-03-28 NOTE — ED NOTES
Bed: ED25  Expected date:   Expected time:   Means of arrival:   Comments:  Saira 2 81M catheter issues urinary retention eta [id number]

## 2022-03-28 NOTE — ED NOTES
Attempted to flush pt's chirinos catheter but unable instill any fluid. Chirinos catheter removed without difficulty.

## 2022-04-06 ENCOUNTER — DOCUMENTATION ONLY (OUTPATIENT)
Dept: CARDIOLOGY | Facility: CLINIC | Age: 82
End: 2022-04-06
Payer: MEDICARE

## 2022-04-06 NOTE — PROGRESS NOTES
Patient missed remote device check 3/30/22.   Missed appointment letter sent 3/31/22. No response.  1 week missed appointment letter sent today, 4/6/22.    Cecilio Pierre,CVT  [facility]

## 2022-04-15 ENCOUNTER — HOSPITAL ENCOUNTER (EMERGENCY)
Facility: CLINIC | Age: 82
Discharge: HOME | End: 2022-04-15
Attending: EMERGENCY MEDICINE | Admitting: EMERGENCY MEDICINE
Payer: MEDICARE

## 2022-04-15 VITALS
SYSTOLIC BLOOD PRESSURE: 124 MMHG | RESPIRATION RATE: 20 BRPM | DIASTOLIC BLOOD PRESSURE: 79 MMHG | OXYGEN SATURATION: 93 % | TEMPERATURE: 97.5 F | BODY MASS INDEX: 32.51 KG/M2 | HEIGHT: 72 IN | HEART RATE: 81 BPM | WEIGHT: 240 LBS

## 2022-04-15 DIAGNOSIS — T83.011A MALFUNCTION OF FOLEY CATHETER, INITIAL ENCOUNTER (H): ICD-10-CM

## 2022-04-15 LAB
ALBUMIN UR-MCNC: 20 MG/DL
APPEARANCE UR: ABNORMAL
BACTERIA #/AREA URNS HPF: ABNORMAL /HPF
BILIRUB UR QL STRIP: NEGATIVE
COLOR UR AUTO: ABNORMAL
GLUCOSE UR STRIP-MCNC: NEGATIVE MG/DL
HGB UR QL STRIP: NEGATIVE
KETONES UR STRIP-MCNC: NEGATIVE MG/DL
LEUKOCYTE ESTERASE UR QL STRIP: ABNORMAL
MUCOUS THREADS #/AREA URNS LPF: PRESENT /LPF
NITRATE UR QL: POSITIVE
PH UR STRIP: 7.5 [PH] (ref 5–7)
RBC URINE: 12 /HPF
SP GR UR STRIP: 1.01 (ref 1–1.03)
UROBILINOGEN UR STRIP-MCNC: NORMAL MG/DL
WBC CLUMPS #/AREA URNS HPF: PRESENT /HPF
WBC URINE: 168 /HPF

## 2022-04-15 PROCEDURE — 51702 INSERT TEMP BLADDER CATH: CPT

## 2022-04-15 PROCEDURE — 99283 EMERGENCY DEPT VISIT LOW MDM: CPT

## 2022-04-15 PROCEDURE — 81001 URINALYSIS AUTO W/SCOPE: CPT | Performed by: EMERGENCY MEDICINE

## 2022-04-15 PROCEDURE — 87088 URINE BACTERIA CULTURE: CPT | Performed by: EMERGENCY MEDICINE

## 2022-04-15 RX ORDER — CIPROFLOXACIN 500 MG/1
500 TABLET, FILM COATED ORAL 2 TIMES DAILY
Qty: 10 TABLET | Refills: 0 | Status: SHIPPED | OUTPATIENT
Start: 2022-04-15 | End: 2022-04-15

## 2022-04-15 RX ORDER — CIPROFLOXACIN 500 MG/1
500 TABLET, FILM COATED ORAL 2 TIMES DAILY
Qty: 14 TABLET | Refills: 0 | Status: SHIPPED | OUTPATIENT
Start: 2022-04-15 | End: 2022-04-22

## 2022-04-15 ASSESSMENT — ENCOUNTER SYMPTOMS
FEVER: 0
ABDOMINAL PAIN: 1
VOMITING: 0

## 2022-04-15 NOTE — ED NOTES
Patient given first dose of prescribed ciprofloxacin that was filled by  pharmacy, transport ETA of 1930pm.

## 2022-04-15 NOTE — ED TRIAGE NOTES
Patient comes from [place] with clogged catheter. Unclear if they tried to flush. Patient alert and oriented. Uses aleks lift at home due to left sided weakness.

## 2022-04-15 NOTE — ED PROVIDER NOTES
History   Chief Complaint:  Catheter Problem       The history is provided by the patient.      Ron Gilmore is a 81 year old male with history of hypertension and type II diabetes mellitus who presents with a plugged catheter which he noticed at noon when they attempted to flush the catheter twice. He notes of abdominal pain due to urinary retention. He had this catheter placed a week ago. He always has one in place. His catheter was last working this morning. Denies vomiting or fever.     Review of Systems   Constitutional: Negative for fever.   Gastrointestinal: Positive for abdominal pain. Negative for diarrhea, nausea and vomiting.   Genitourinary: Negative for hematuria.   All other systems reviewed and are negative.      Allergies:  The patient has no known allergies.     Medications:  Zyloprim  Aspirin 81  Lipitor  Amlactin ultra  Ferosul  Lasix  Combivent respimat  Lopressor  Protonix  Paxil    Past Medical History:     BPH  Cataract  Cholelithiasis  COPD  Depression  Diabetes mellitus, type II  Dyshidrotic foot dermatitis  Edema  Gout  Hyperlipidemia  Hypertension  Covid-19 infection  Kidney stones  Lumbago  Neuropathy  Spinal stenosis  Stroke  Unsteady gait  UTI  Vasovagal episode  Systemic inflammatory response syndrome  CAP  Closed tibia fracture  Severe sepsis  Gastrointestinal hemorrhage  DVT  Dysphagia due to CVA  Iron deficiency anemia      Past Surgical History:    Appendectomy  Arthroscopy shoulder rotator cuff repair  Cataract surgery, bilateral  Colonoscopy x2  Cystoscopy  Cystoscopy, transurethral resection prostate, combined  EP loop recorder implant  ECG, combined  Eye surgery, right  IVC filter placement  Nephrostomy tube placement, right  Urethral stent placement, right  Joint replacement, hip  Knee surgery, bilateral  Laminectomy, lumbar one level  Laser holimum lithotripsy ureters, insert stent, combined, right  Tonsillectomy     Family History:    Father - prostate cancer    Social  History:  The patient presents to the ED alone.    Physical Exam     Patient Vitals for the past 24 hrs:   BP Temp Temp src Pulse Resp SpO2 Height Weight   04/15/22 1459 124/56 97.5  F (36.4  C) Temporal 86 20 95 % 1.829 m (6') 108.9 kg (240 lb)       Physical Exam  Constitutional: Well appearing.  HEENT: Atraumatic. Moist mucous membranes.  Neck: Soft.  Supple.   Cardiac: Regular rate and rhythm.  No murmur or rub.  Respiratory: Clear to auscultation bilaterally.  No respiratory distress.   Abdomen: Soft with mild suprapubic tenderness no rebound or guarding.  Nondistended.  Musculoskeletal: No edema.  Normal range of motion.  Neurologic: Alert and oriented x3.  Baseline hemiplegia.  Skin: No rashes.  No edema.  Psych: Normal affect.  Normal behavior.      Emergency Department Course     Laboratory:  Labs Ordered and Resulted from Time of ED Arrival to Time of ED Departure   ROUTINE UA WITH MICROSCOPIC REFLEX TO CULTURE - Abnormal       Result Value    Color Urine Straw      Appearance Urine Slightly Cloudy (*)     Glucose Urine Negative      Bilirubin Urine Negative      Ketones Urine Negative      Specific Gravity Urine 1.012      Blood Urine Negative      pH Urine 7.5 (*)     Protein Albumin Urine 20  (*)     Urobilinogen Urine Normal      Nitrite Urine Positive (*)     Leukocyte Esterase Urine Large (*)     Bacteria Urine Few (*)     WBC Clumps Urine Present (*)     Mucus Urine Present (*)     RBC Urine 12 (*)     WBC Urine 168 (*)    URINE CULTURE        Emergency Department Course:       Reviewed:  I reviewed nursing notes, vitals, past medical history and Care Everywhere    Assessments:  1528 I obtained history and examined the patient as noted above.     1640 I rechecked the patient and explained findings.     Disposition:  The patient was discharged to home.     Impression & Plan     CMS Diagnoses: None    Medical Decision Making:  Ron Gilmore is a 81-year-old man who is afebrile and hemodynamically  stable.  His clogged urinary Cardona catheter and pressure in the suprapubic area.  He has no systemic signs of infection.  We discussed options including irrigation versus replacement of the Cardona and he opted to replace Cardona which was performed.  He is now draining urine without difficulty and feels much improved.  His urine is suspicious for infection, however, he has chronic indwelling Cardona.  He discussed he is usually treated with Cipro so he is requesting Cipro until urine culture returns.  I discussed this with him and he is in agreement.  There is no negation for antibiotics or admission to the hospital.  He feels countable discharge home.  He was discharged home via EMS to his facility.    Critical Care Time: None    Diagnosis:    ICD-10-CM    1. Malfunction of Cardona catheter, initial encounter (H)  T83.011A        Discharge Medications:  Current Discharge Medication List      START taking these medications    Details   ciprofloxacin (CIPRO) 500 MG tablet Take 1 tablet (500 mg) by mouth 2 times daily for 7 days  Qty: 14 tablet, Refills: 0             Scribe Disclosure:  I, Ramya Dumont, am serving as a scribe at 3:28 PM on 4/15/2022 to document services personally performed by Edward Sebastian MD based on my observations and the provider's statements to me.            Edward Sebastian MD  04/16/22 8118

## 2022-04-15 NOTE — ED NOTES
Cardona cath replaced and soiled depends changed. Cream placed on pressure injury to buttock area.

## 2022-04-15 NOTE — ED NOTES
Bed: ED24  Expected date:   Expected time:   Means of arrival:   Comments:  Saira 2 81 M cath issues will need a [facility] ETA [id number]

## 2022-04-16 LAB
BACTERIA UR CULT: ABNORMAL
BACTERIA UR CULT: ABNORMAL

## 2022-04-16 ASSESSMENT — ENCOUNTER SYMPTOMS
NAUSEA: 0
DIARRHEA: 0
HEMATURIA: 0

## 2022-04-16 NOTE — ED NOTES
[facility] here for transport back to [facility]. Report given, discharge papers and prescription sent with. [facility] updated.

## 2022-05-01 ENCOUNTER — APPOINTMENT (OUTPATIENT)
Dept: GENERAL RADIOLOGY | Facility: CLINIC | Age: 82
DRG: 698 | End: 2022-05-01
Attending: EMERGENCY MEDICINE
Payer: MEDICARE

## 2022-05-01 ENCOUNTER — HOSPITAL ENCOUNTER (INPATIENT)
Facility: CLINIC | Age: 82
LOS: 4 days | Discharge: HOME HEALTH | DRG: 698 | End: 2022-05-05
Attending: EMERGENCY MEDICINE | Admitting: HOSPITALIST
Payer: MEDICARE

## 2022-05-01 ENCOUNTER — APPOINTMENT (OUTPATIENT)
Dept: CT IMAGING | Facility: CLINIC | Age: 82
DRG: 698 | End: 2022-05-01
Attending: EMERGENCY MEDICINE
Payer: MEDICARE

## 2022-05-01 DIAGNOSIS — J44.9 CHRONIC OBSTRUCTIVE PULMONARY DISEASE, UNSPECIFIED COPD TYPE (H): ICD-10-CM

## 2022-05-01 DIAGNOSIS — J96.01 ACUTE RESPIRATORY FAILURE WITH HYPOXIA (H): ICD-10-CM

## 2022-05-01 DIAGNOSIS — A41.9 SEVERE SEPSIS (H): ICD-10-CM

## 2022-05-01 DIAGNOSIS — N13.1 HYDRONEPHROSIS DUE TO OBSTRUCTION OF URETER: ICD-10-CM

## 2022-05-01 DIAGNOSIS — J18.9 COMMUNITY ACQUIRED PNEUMONIA, UNSPECIFIED LATERALITY: ICD-10-CM

## 2022-05-01 DIAGNOSIS — T83.511A URINARY TRACT INFECTION ASSOCIATED WITH INDWELLING URETHRAL CATHETER, INITIAL ENCOUNTER (H): ICD-10-CM

## 2022-05-01 DIAGNOSIS — N39.0 URINARY TRACT INFECTION ASSOCIATED WITH INDWELLING URETHRAL CATHETER, INITIAL ENCOUNTER (H): ICD-10-CM

## 2022-05-01 DIAGNOSIS — J18.9 PNEUMONIA OF BOTH LUNGS DUE TO INFECTIOUS ORGANISM, UNSPECIFIED PART OF LUNG: Primary | ICD-10-CM

## 2022-05-01 DIAGNOSIS — J96.11 CHRONIC RESPIRATORY FAILURE WITH HYPOXIA (H): ICD-10-CM

## 2022-05-01 DIAGNOSIS — R65.20 SEVERE SEPSIS (H): ICD-10-CM

## 2022-05-01 LAB
ALBUMIN SERPL-MCNC: 2.6 G/DL (ref 3.4–5)
ALBUMIN UR-MCNC: 20 MG/DL
ALP SERPL-CCNC: 60 U/L (ref 40–150)
ALT SERPL W P-5'-P-CCNC: 13 U/L (ref 0–70)
ANION GAP SERPL CALCULATED.3IONS-SCNC: 6 MMOL/L (ref 3–14)
APPEARANCE UR: ABNORMAL
AST SERPL W P-5'-P-CCNC: 11 U/L (ref 0–45)
ATRIAL RATE - MUSE: 105 BPM
BASOPHILS # BLD AUTO: 0 10E3/UL (ref 0–0.2)
BASOPHILS NFR BLD AUTO: 0 %
BILIRUB SERPL-MCNC: 0.8 MG/DL (ref 0.2–1.3)
BILIRUB UR QL STRIP: NEGATIVE
BUN SERPL-MCNC: 19 MG/DL (ref 7–30)
CALCIUM SERPL-MCNC: 9.1 MG/DL (ref 8.5–10.1)
CHLORIDE BLD-SCNC: 95 MMOL/L (ref 94–109)
CO2 SERPL-SCNC: 35 MMOL/L (ref 20–32)
COLOR UR AUTO: ABNORMAL
CREAT SERPL-MCNC: 1.21 MG/DL (ref 0.66–1.25)
D DIMER PPP FEU-MCNC: 1.2 UG/ML FEU (ref 0–0.5)
DIASTOLIC BLOOD PRESSURE - MUSE: NORMAL MMHG
EOSINOPHIL # BLD AUTO: 0 10E3/UL (ref 0–0.7)
EOSINOPHIL NFR BLD AUTO: 0 %
ERYTHROCYTE [DISTWIDTH] IN BLOOD BY AUTOMATED COUNT: 14.5 % (ref 10–15)
FLUAV RNA SPEC QL NAA+PROBE: NEGATIVE
FLUBV RNA RESP QL NAA+PROBE: NEGATIVE
GFR SERPL CREATININE-BSD FRML MDRD: 61 ML/MIN/1.73M2
GLUCOSE BLD-MCNC: 230 MG/DL (ref 70–99)
GLUCOSE UR STRIP-MCNC: NEGATIVE MG/DL
HBA1C MFR BLD: 6.1 % (ref 0–5.6)
HCO3 BLDV-SCNC: 36 MMOL/L (ref 21–28)
HCT VFR BLD AUTO: 40.2 % (ref 40–53)
HGB BLD-MCNC: 12.3 G/DL (ref 13.3–17.7)
HGB UR QL STRIP: ABNORMAL
HOLD SPECIMEN: NORMAL
HOLD SPECIMEN: NORMAL
HYALINE CASTS: 4 /LPF
IMM GRANULOCYTES # BLD: 0.1 10E3/UL
IMM GRANULOCYTES NFR BLD: 1 %
INTERPRETATION ECG - MUSE: NORMAL
KETONES UR STRIP-MCNC: NEGATIVE MG/DL
LACTATE BLD-SCNC: 2.2 MMOL/L
LEUKOCYTE ESTERASE UR QL STRIP: ABNORMAL
LYMPHOCYTES # BLD AUTO: 0.6 10E3/UL (ref 0.8–5.3)
LYMPHOCYTES NFR BLD AUTO: 4 %
MCH RBC QN AUTO: 31.5 PG (ref 26.5–33)
MCHC RBC AUTO-ENTMCNC: 30.6 G/DL (ref 31.5–36.5)
MCV RBC AUTO: 103 FL (ref 78–100)
MONOCYTES # BLD AUTO: 1 10E3/UL (ref 0–1.3)
MONOCYTES NFR BLD AUTO: 7 %
MUCOUS THREADS #/AREA URNS LPF: PRESENT /LPF
NEUTROPHILS # BLD AUTO: 12.4 10E3/UL (ref 1.6–8.3)
NEUTROPHILS NFR BLD AUTO: 88 %
NITRATE UR QL: NEGATIVE
NRBC # BLD AUTO: 0 10E3/UL
NRBC BLD AUTO-RTO: 0 /100
NT-PROBNP SERPL-MCNC: 546 PG/ML (ref 0–1800)
P AXIS - MUSE: 23 DEGREES
PCO2 BLDV: 60 MM HG (ref 40–50)
PH BLDV: 7.39 [PH] (ref 7.32–7.43)
PH UR STRIP: 6.5 [PH] (ref 5–7)
PLATELET # BLD AUTO: 196 10E3/UL (ref 150–450)
PO2 BLDV: 20 MM HG (ref 25–47)
POTASSIUM BLD-SCNC: 4.4 MMOL/L (ref 3.4–5.3)
PR INTERVAL - MUSE: 168 MS
PROCALCITONIN SERPL-MCNC: 0.22 NG/ML
PROT SERPL-MCNC: 7.6 G/DL (ref 6.8–8.8)
QRS DURATION - MUSE: 130 MS
QT - MUSE: 356 MS
QTC - MUSE: 470 MS
R AXIS - MUSE: 19 DEGREES
RBC # BLD AUTO: 3.9 10E6/UL (ref 4.4–5.9)
RBC URINE: >182 /HPF
RSV RNA SPEC NAA+PROBE: NEGATIVE
SAO2 % BLDV: 29 % (ref 94–100)
SARS-COV-2 RNA RESP QL NAA+PROBE: NEGATIVE
SODIUM SERPL-SCNC: 136 MMOL/L (ref 133–144)
SP GR UR STRIP: 1.01 (ref 1–1.03)
SQUAMOUS EPITHELIAL: 2 /HPF
SYSTOLIC BLOOD PRESSURE - MUSE: NORMAL MMHG
T AXIS - MUSE: 16 DEGREES
TROPONIN I SERPL HS-MCNC: 9 NG/L
UROBILINOGEN UR STRIP-MCNC: NORMAL MG/DL
VENTRICULAR RATE- MUSE: 105 BPM
WBC # BLD AUTO: 14 10E3/UL (ref 4–11)
WBC CLUMPS #/AREA URNS HPF: PRESENT /HPF
WBC URINE: >182 /HPF

## 2022-05-01 PROCEDURE — 96365 THER/PROPH/DIAG IV INF INIT: CPT | Mod: 59

## 2022-05-01 PROCEDURE — 120N000001 HC R&B MED SURG/OB

## 2022-05-01 PROCEDURE — 80053 COMPREHEN METABOLIC PANEL: CPT | Performed by: EMERGENCY MEDICINE

## 2022-05-01 PROCEDURE — 250N000011 HC RX IP 250 OP 636: Performed by: EMERGENCY MEDICINE

## 2022-05-01 PROCEDURE — 87077 CULTURE AEROBIC IDENTIFY: CPT | Performed by: EMERGENCY MEDICINE

## 2022-05-01 PROCEDURE — 83880 ASSAY OF NATRIURETIC PEPTIDE: CPT | Performed by: EMERGENCY MEDICINE

## 2022-05-01 PROCEDURE — 71275 CT ANGIOGRAPHY CHEST: CPT | Mod: ME

## 2022-05-01 PROCEDURE — 82803 BLOOD GASES ANY COMBINATION: CPT

## 2022-05-01 PROCEDURE — 99292 CRITICAL CARE ADDL 30 MIN: CPT

## 2022-05-01 PROCEDURE — 36415 COLL VENOUS BLD VENIPUNCTURE: CPT | Performed by: EMERGENCY MEDICINE

## 2022-05-01 PROCEDURE — 99291 CRITICAL CARE FIRST HOUR: CPT | Mod: 25

## 2022-05-01 PROCEDURE — 84145 PROCALCITONIN (PCT): CPT | Performed by: HOSPITALIST

## 2022-05-01 PROCEDURE — 71046 X-RAY EXAM CHEST 2 VIEWS: CPT

## 2022-05-01 PROCEDURE — 81001 URINALYSIS AUTO W/SCOPE: CPT | Performed by: EMERGENCY MEDICINE

## 2022-05-01 PROCEDURE — 250N000009 HC RX 250

## 2022-05-01 PROCEDURE — 85379 FIBRIN DEGRADATION QUANT: CPT | Performed by: EMERGENCY MEDICINE

## 2022-05-01 PROCEDURE — 83036 HEMOGLOBIN GLYCOSYLATED A1C: CPT | Performed by: HOSPITALIST

## 2022-05-01 PROCEDURE — 250N000009 HC RX 250: Performed by: HOSPITALIST

## 2022-05-01 PROCEDURE — 250N000011 HC RX IP 250 OP 636: Performed by: HOSPITALIST

## 2022-05-01 PROCEDURE — 250N000009 HC RX 250: Performed by: EMERGENCY MEDICINE

## 2022-05-01 PROCEDURE — 87149 DNA/RNA DIRECT PROBE: CPT | Performed by: EMERGENCY MEDICINE

## 2022-05-01 PROCEDURE — 93005 ELECTROCARDIOGRAM TRACING: CPT

## 2022-05-01 PROCEDURE — 87637 SARSCOV2&INF A&B&RSV AMP PRB: CPT | Performed by: EMERGENCY MEDICINE

## 2022-05-01 PROCEDURE — C9803 HOPD COVID-19 SPEC COLLECT: HCPCS

## 2022-05-01 PROCEDURE — G1004 CDSM NDSC: HCPCS

## 2022-05-01 PROCEDURE — 96375 TX/PRO/DX INJ NEW DRUG ADDON: CPT

## 2022-05-01 PROCEDURE — 250N000013 HC RX MED GY IP 250 OP 250 PS 637: Performed by: HOSPITALIST

## 2022-05-01 PROCEDURE — 85025 COMPLETE CBC W/AUTO DIFF WBC: CPT | Performed by: EMERGENCY MEDICINE

## 2022-05-01 PROCEDURE — 84484 ASSAY OF TROPONIN QUANT: CPT | Performed by: EMERGENCY MEDICINE

## 2022-05-01 PROCEDURE — 87086 URINE CULTURE/COLONY COUNT: CPT | Performed by: EMERGENCY MEDICINE

## 2022-05-01 PROCEDURE — 99223 1ST HOSP IP/OBS HIGH 75: CPT | Mod: AI | Performed by: HOSPITALIST

## 2022-05-01 RX ORDER — ONDANSETRON 2 MG/ML
4 INJECTION INTRAMUSCULAR; INTRAVENOUS EVERY 6 HOURS PRN
Status: DISCONTINUED | OUTPATIENT
Start: 2022-05-01 | End: 2022-05-05 | Stop reason: HOSPADM

## 2022-05-01 RX ORDER — AMMONIUM LACTATE 12 G/100G
LOTION TOPICAL DAILY
COMMUNITY
End: 2023-05-04

## 2022-05-01 RX ORDER — NYSTATIN 100000 [USP'U]/G
POWDER TOPICAL 2 TIMES DAILY
COMMUNITY
End: 2022-09-23

## 2022-05-01 RX ORDER — CIPROFLOXACIN 250 MG/1
250 TABLET, FILM COATED ORAL 2 TIMES DAILY
Status: ON HOLD | COMMUNITY
End: 2022-05-05

## 2022-05-01 RX ORDER — PANTOPRAZOLE SODIUM 40 MG/1
40 TABLET, DELAYED RELEASE ORAL
Status: DISCONTINUED | OUTPATIENT
Start: 2022-05-01 | End: 2022-05-05 | Stop reason: HOSPADM

## 2022-05-01 RX ORDER — ASPIRIN 81 MG/1
81 TABLET ORAL DAILY
Status: DISCONTINUED | OUTPATIENT
Start: 2022-05-02 | End: 2022-05-05 | Stop reason: HOSPADM

## 2022-05-01 RX ORDER — FUROSEMIDE 40 MG
40 TABLET ORAL DAILY
Status: DISCONTINUED | OUTPATIENT
Start: 2022-05-02 | End: 2022-05-05 | Stop reason: HOSPADM

## 2022-05-01 RX ORDER — ALLOPURINOL 300 MG/1
300 TABLET ORAL DAILY
Status: DISCONTINUED | OUTPATIENT
Start: 2022-05-02 | End: 2022-05-05 | Stop reason: HOSPADM

## 2022-05-01 RX ORDER — PAROXETINE 40 MG/1
40 TABLET, FILM COATED ORAL EVERY MORNING
COMMUNITY
End: 2024-08-20

## 2022-05-01 RX ORDER — IPRATROPIUM BROMIDE AND ALBUTEROL SULFATE 2.5; .5 MG/3ML; MG/3ML
3 SOLUTION RESPIRATORY (INHALATION) EVERY 4 HOURS
Status: DISCONTINUED | OUTPATIENT
Start: 2022-05-01 | End: 2022-05-05 | Stop reason: HOSPADM

## 2022-05-01 RX ORDER — ONDANSETRON 4 MG/1
4 TABLET, ORALLY DISINTEGRATING ORAL EVERY 6 HOURS PRN
Status: DISCONTINUED | OUTPATIENT
Start: 2022-05-01 | End: 2022-05-05 | Stop reason: HOSPADM

## 2022-05-01 RX ORDER — SIMETHICONE 80 MG
80 TABLET,CHEWABLE ORAL 3 TIMES DAILY PRN
Status: DISCONTINUED | OUTPATIENT
Start: 2022-05-01 | End: 2022-05-05 | Stop reason: HOSPADM

## 2022-05-01 RX ORDER — METHYLPREDNISOLONE SODIUM SUCCINATE 40 MG/ML
40 INJECTION, POWDER, LYOPHILIZED, FOR SOLUTION INTRAMUSCULAR; INTRAVENOUS ONCE
Status: COMPLETED | OUTPATIENT
Start: 2022-05-01 | End: 2022-05-01

## 2022-05-01 RX ORDER — ENOXAPARIN SODIUM 100 MG/ML
40 INJECTION SUBCUTANEOUS EVERY 24 HOURS
Status: DISCONTINUED | OUTPATIENT
Start: 2022-05-01 | End: 2022-05-05 | Stop reason: HOSPADM

## 2022-05-01 RX ORDER — POLYETHYLENE GLYCOL 3350 17 G/17G
17 POWDER, FOR SOLUTION ORAL DAILY PRN
Status: DISCONTINUED | OUTPATIENT
Start: 2022-05-01 | End: 2022-05-05 | Stop reason: HOSPADM

## 2022-05-01 RX ORDER — AMMONIUM LACTATE 12 G/100G
LOTION TOPICAL DAILY
Status: DISCONTINUED | OUTPATIENT
Start: 2022-05-02 | End: 2022-05-05 | Stop reason: HOSPADM

## 2022-05-01 RX ORDER — WATER 10 ML/10ML
INJECTION INTRAMUSCULAR; INTRAVENOUS; SUBCUTANEOUS
Status: COMPLETED
Start: 2022-05-01 | End: 2022-05-01

## 2022-05-01 RX ORDER — PAROXETINE 10 MG/1
10 TABLET, FILM COATED ORAL EVERY MORNING
Status: DISCONTINUED | OUTPATIENT
Start: 2022-05-02 | End: 2022-05-01

## 2022-05-01 RX ORDER — PIPERACILLIN SODIUM, TAZOBACTAM SODIUM 4; .5 G/20ML; G/20ML
4.5 INJECTION, POWDER, LYOPHILIZED, FOR SOLUTION INTRAVENOUS EVERY 6 HOURS
Status: DISCONTINUED | OUTPATIENT
Start: 2022-05-01 | End: 2022-05-01

## 2022-05-01 RX ORDER — METHYLPREDNISOLONE SODIUM SUCCINATE 40 MG/ML
40 INJECTION, POWDER, LYOPHILIZED, FOR SOLUTION INTRAMUSCULAR; INTRAVENOUS EVERY 12 HOURS
Status: DISCONTINUED | OUTPATIENT
Start: 2022-05-01 | End: 2022-05-03 | Stop reason: ALTCHOICE

## 2022-05-01 RX ORDER — PIPERACILLIN SODIUM, TAZOBACTAM SODIUM 4; .5 G/20ML; G/20ML
4.5 INJECTION, POWDER, LYOPHILIZED, FOR SOLUTION INTRAVENOUS ONCE
Status: COMPLETED | OUTPATIENT
Start: 2022-05-01 | End: 2022-05-01

## 2022-05-01 RX ORDER — SIMETHICONE 125 MG
125 TABLET,CHEWABLE ORAL 3 TIMES DAILY PRN
COMMUNITY

## 2022-05-01 RX ORDER — IOPAMIDOL 755 MG/ML
79 INJECTION, SOLUTION INTRAVASCULAR ONCE
Status: COMPLETED | OUTPATIENT
Start: 2022-05-01 | End: 2022-05-01

## 2022-05-01 RX ORDER — PAROXETINE 40 MG/1
40 TABLET, FILM COATED ORAL EVERY MORNING
Status: DISCONTINUED | OUTPATIENT
Start: 2022-05-02 | End: 2022-05-01

## 2022-05-01 RX ORDER — PIPERACILLIN SODIUM, TAZOBACTAM SODIUM 4; .5 G/20ML; G/20ML
4.5 INJECTION, POWDER, LYOPHILIZED, FOR SOLUTION INTRAVENOUS EVERY 6 HOURS
Status: DISCONTINUED | OUTPATIENT
Start: 2022-05-01 | End: 2022-05-05 | Stop reason: HOSPADM

## 2022-05-01 RX ORDER — LIDOCAINE 40 MG/G
CREAM TOPICAL
Status: DISCONTINUED | OUTPATIENT
Start: 2022-05-01 | End: 2022-05-05 | Stop reason: HOSPADM

## 2022-05-01 RX ORDER — FERROUS SULFATE 325(65) MG
325 TABLET ORAL DAILY
Status: DISCONTINUED | OUTPATIENT
Start: 2022-05-02 | End: 2022-05-05 | Stop reason: HOSPADM

## 2022-05-01 RX ORDER — PAROXETINE 10 MG/1
10 TABLET, FILM COATED ORAL EVERY MORNING
COMMUNITY
End: 2024-08-20

## 2022-05-01 RX ORDER — ATORVASTATIN CALCIUM 10 MG/1
10 TABLET, FILM COATED ORAL DAILY
Status: DISCONTINUED | OUTPATIENT
Start: 2022-05-02 | End: 2022-05-05 | Stop reason: HOSPADM

## 2022-05-01 RX ADMIN — PANTOPRAZOLE SODIUM 40 MG: 40 TABLET, DELAYED RELEASE ORAL at 21:38

## 2022-05-01 RX ADMIN — PIPERACILLIN AND TAZOBACTAM 4.5 G: 4; .5 INJECTION, POWDER, LYOPHILIZED, FOR SOLUTION INTRAVENOUS at 12:35

## 2022-05-01 RX ADMIN — ENOXAPARIN SODIUM 40 MG: 40 INJECTION SUBCUTANEOUS at 21:36

## 2022-05-01 RX ADMIN — IPRATROPIUM BROMIDE AND ALBUTEROL SULFATE 3 ML: .5; 3 SOLUTION RESPIRATORY (INHALATION) at 16:12

## 2022-05-01 RX ADMIN — SODIUM CHLORIDE 101 ML: 900 INJECTION INTRAVENOUS at 14:47

## 2022-05-01 RX ADMIN — IOPAMIDOL 79 ML: 755 INJECTION, SOLUTION INTRAVENOUS at 14:47

## 2022-05-01 RX ADMIN — METOPROLOL TARTRATE 12.5 MG: 25 TABLET, FILM COATED ORAL at 21:38

## 2022-05-01 RX ADMIN — METHYLPREDNISOLONE SODIUM SUCCINATE 40 MG: 40 INJECTION, POWDER, FOR SOLUTION INTRAMUSCULAR; INTRAVENOUS at 14:42

## 2022-05-01 RX ADMIN — MICONAZOLE NITRATE: 20 POWDER TOPICAL at 23:00

## 2022-05-01 RX ADMIN — PIPERACILLIN SODIUM AND TAZOBACTAM SODIUM 4.5 G: 4; .5 INJECTION, POWDER, LYOPHILIZED, FOR SOLUTION INTRAVENOUS at 19:57

## 2022-05-01 RX ADMIN — METHYLPREDNISOLONE SODIUM SUCCINATE 40 MG: 40 INJECTION, POWDER, FOR SOLUTION INTRAMUSCULAR; INTRAVENOUS at 21:26

## 2022-05-01 RX ADMIN — WATER: 1 INJECTION INTRAMUSCULAR; INTRAVENOUS; SUBCUTANEOUS at 21:44

## 2022-05-01 ASSESSMENT — ACTIVITIES OF DAILY LIVING (ADL)
ADLS_ACUITY_SCORE: 12
ADLS_ACUITY_SCORE: 12
ADLS_ACUITY_SCORE: 14
ADLS_ACUITY_SCORE: 12

## 2022-05-01 ASSESSMENT — ENCOUNTER SYMPTOMS
SHORTNESS OF BREATH: 1
COUGH: 1
ABDOMINAL PAIN: 0
FEVER: 1

## 2022-05-01 NOTE — ED PROVIDER NOTES
"  History     Chief Complaint:  Shortness of Breath       HPI   Ron Gilmore is a 81 year old male who presents with shortness of breath.  Patient notes that shortness of breath began 2 days ago and has progressively worsened.  He notes feeling feverish though not running fevers and having increased cough.  Patient has history of left-sided paralysis secondary to previous stroke and has indwelling Cardona catheter.  Reports concern for UTI 3 days ago though has not been initiated on antibiotics.  Patient was noted to be satting in the 80s on room air and this improved with 3 to 4 L nasal cannula.  Patient was tachypneic and tachycardic for EMS with \"wet lungs\".  On arrival patient denies abdominal or chest pain or significant increase in lower extremity swelling.  Patient with history of COPD and CHF; is on Lasix.  Patient is DNR/DNI.    ROS:  Review of Systems   Constitutional: Positive for fever (Subjective).   Respiratory: Positive for cough and shortness of breath.    Cardiovascular: Negative for chest pain.   Gastrointestinal: Negative for abdominal pain.   All other systems reviewed and are negative.    Allergies:  No Known Allergies     Medications:    acetaminophen (TYLENOL) 325 MG tablet  allopurinol (ZYLOPRIM) 300 MG tablet  aspirin (ASA) 81 MG EC tablet  atorvastatin (LIPITOR) 10 MG tablet  cyanocobalamin (VITAMIN B-12) 500 MCG SUBL sublingual tablet  Emollient (AMLACTIN ULTRA EX)  ferrous sulfate (FEROSUL) 325 (65 Fe) MG tablet  furosemide (LASIX) 40 MG tablet  ipratropium-albuterol (COMBIVENT RESPIMAT)  MCG/ACT inhaler  metoprolol tartrate (LOPRESSOR) 25 MG tablet  pantoprazole (PROTONIX) 40 MG EC tablet  PARoxetine (PAXIL) 30 MG tablet  polyethylene glycol (MIRALAX) 17 GM/Dose powder  Skin Protectants, Misc. (CALAZIME SKIN PROTECTANT) PSTE        Past Medical History:    Past Medical History:   Diagnosis Date     BPH (benign prostatic hyperplasia)      Cataract      Cholelithiasis      COPD " (chronic obstructive pulmonary disease) (H)      Depression      Diabetes mellitus      Dyshidrotic foot dermatitis      Edema      Gout      Hyperlipidemia      Hypertension      Infection due to 2019 novel coronavirus 5/1/2021     Kidney stones      Lumbago      Lumbar disc displacement without myelopathy      Muscle weakness      Neuropathy, diabetic (H)      Obesity      Spinal stenosis      Stroke (H)      Unsteady gait      Urinary retention with incomplete bladder emptying      UTI (urinary tract infection)      Vasovagal episode      Patient Active Problem List   Diagnosis     SIRS (systemic inflammatory response syndrome) (H)     Vasovagal episode     CAP (community acquired pneumonia)     Tibia/fibula fracture     Closed tibia fracture     Post-operative state     Major depressive disorder, recurrent episode (H)     Urinary retention     COPD (chronic obstructive pulmonary disease) (H)     Weakness of left upper extremity     Left-sided weakness     Cerebrovascular accident (CVA), unspecified mechanism (H)     Morbid obesity, unspecified obesity type (H)     Type 2 diabetes mellitus without complication, without long-term current use of insulin (H)     Metabolic encephalopathy     Urinary tract infection with hematuria, site unspecified     Severe sepsis (H)     Lactic acidosis     Febrile illness     Urinary tract infection associated with indwelling urethral catheter, initial encounter (H)     Obstructive right sided ureteral stone resulting in hydronephrosis     Acute respiratory failure with hypoxia (H)     Vomiting, intractability of vomiting not specified, presence of nausea not specified, unspecified vomiting type     Pneumonia of both lungs due to infectious organism, unspecified part of lung     Acute and chronic respiratory failure with hypoxia (H)     Iron deficiency anemia, unspecified iron deficiency anemia type     Physical deconditioning     Hemiparesis affecting left side as late effect of  cerebrovascular accident (CVA) (H)     Acute deep vein thrombosis (DVT) of right peroneal vein (H)     Dysphagia due to recent cerebrovascular accident (CVA)     Chronic respiratory failure with hypoxia (H)     Gastrointestinal hemorrhage, unspecified gastrointestinal hemorrhage type     Sepsis, due to unspecified organism, unspecified whether acute organ dysfunction present (H)        Past Surgical History:    Past Surgical History:   Procedure Laterality Date     APPENDECTOMY OPEN       ARTHROSCOPY SHOULDER ROTATOR CUFF REPAIR       cataracts Bilateral      CHOLECYSTECTOMY       COLONOSCOPY  1986     COLONOSCOPY N/A 5/29/2021    Procedure: COLONOSCOPY;  Surgeon: Kofi Davis MD;  Location:  GI     CYSTOSCOPY  10/19/2011    Procedure:CYSTOSCOPY; CYSTOSCOPY, BLADDER STONE REMOVAL; Surgeon:ROB SAWYER; Location: OR     CYSTOSCOPY, TRANSURETHRAL RESECTION (TUR) PROSTATE, COMBINED N/A 2/21/2018    Procedure: COMBINED CYSTOSCOPY, TRANSURETHRAL RESECTION (TUR) PROSTATE;  COMBINED CYSTOSCOPY, TRANSURETHRAL RESECTION (TUR) PROSTATE ;  Surgeon: Rob Sawyer MD;  Location:  OR     EP LOOP RECORDER IMPLANT N/A 1/20/2020    Procedure: EP Loop Recorder Implant;  Surgeon: Evgeny Parisi MD;  Location:  HEART CARDI[facility] CATH LAB     ESOPHAGOSCOPY, GASTROSCOPY, DUODENOSCOPY (EGD), COMBINED N/A 5/28/2021    Procedure: ESOPHAGOGASTRODUODENOSCOPY (EGD);  Surgeon: Aurora Waterman MD;  Location:  GI     EYE SURGERY      right lid surgery      IR IVC FILTER PLACEMENT  5/24/2021     IR NEPHROSTOMY TUBE PLACEMENT RIGHT  3/9/2021     IR URETERAL STENT PLACEMENT RIGHT  3/16/2021     JOINT REPLACEMENT Right     HIP     KNEE SURGERY Bilateral      LAMINECTOMY LUMBAR ONE LEVEL       LASER HOLMIUM LITHOTRIPSY URETER(S), INSERT STENT, COMBINED Right 4/14/2021    Procedure: CYSTOSCOPY, BLADDER STONE REMOVAL, RIGHT URETEROSCOPY, HOLMIUM LASER LITHOTRIPSY, AND RIGHT STENT REMOVAL, RIGHT RETROGRADE;  Surgeon: Nate  Rob Lucero MD;  Location: [facility] OR     TONSILLECTOMY          Family History:    family history includes Prostate Cancer in his father.    Social History:  Former smoker  Presents from skilled care facility   PCP: Augustin Thomas     Physical Exam     Patient Vitals for the past 24 hrs:   BP Temp Temp src Pulse Resp SpO2 Height Weight   05/01/22 1345 (!) 150/74 -- -- 98 26 98 % -- --   05/01/22 1300 125/69 -- -- 95 24 100 % -- --   05/01/22 1230 -- -- -- 99 21 99 % -- --   05/01/22 1200 132/69 -- -- 101 -- -- -- --   05/01/22 1130 139/81 -- -- 103 21 96 % -- --   05/01/22 1100 111/71 -- -- 104 22 98 % -- --   05/01/22 1030 (!) 118/91 -- -- 105 23 97 % -- --   05/01/22 1006 -- 98.9  F (37.2  C) Temporal -- -- -- -- --   05/01/22 1000 114/83 -- -- 104 23 100 % -- --   05/01/22 0957 114/83 -- -- 105 22 100 % 1.829 m (6') 108.9 kg (240 lb)        Physical Exam  General: Alert and cooperative with exam. Patient in mild distress. Normal mentation.  Head:  Scalp is NC/AT  Eyes:  No scleral icterus, PERRL  ENT:  The external nose and ears are normal. The oropharynx is normal and without erythema; mucus membranes are moist.   Neck:  Normal range of motion without rigidity.  CV:  Regular rate and rhythm  Resp:  Breath sounds are clear bilaterally.  Mild wet cough.  Nasal cannula in place  GI:  Abdomen is soft, no distension, no tenderness. No peritoneal signs.  Cardona catheter present  MS:  No significant lower extremity edema   Skin:  Warm and dry, No rash or lesions noted.  Neuro: Oriented x 3.  Left-sided hemiparesis      Emergency Department Course   ECG:  Sinus tachycardia  Right bundle branch block  No significant change from 11/8/2021  Ventricular rate 105, IN interval 168, QRS duration 130, QT/QTc 356/470, PRT axes 23-19-16  Interpretation: Barron Schroeder, DO    Imaging:  CT Chest Pulmonary Embolism w Contrast   Final Result   IMPRESSION:   1.  No evidence of pulmonary embolism.   2.  Severe coronary  atherosclerosis.         Chest XR,  PA & LAT   Final Result   IMPRESSION: Cardiac event monitoring device noted. No airspace consolidation, pneumothorax, or pleural effusion.            Report per radiology    Laboratory:  Labs Ordered and Resulted from Time of ED Arrival to Time of ED Departure   COMPREHENSIVE METABOLIC PANEL - Abnormal       Result Value    Sodium 136      Potassium 4.4      Chloride 95      Carbon Dioxide (CO2) 35 (*)     Anion Gap 6      Urea Nitrogen 19      Creatinine 1.21      Calcium 9.1      Glucose 230 (*)     Alkaline Phosphatase 60      AST 11      ALT 13      Protein Total 7.6      Albumin 2.6 (*)     Bilirubin Total 0.8      GFR Estimate 61     CBC WITH PLATELETS AND DIFFERENTIAL - Abnormal    WBC Count 14.0 (*)     RBC Count 3.90 (*)     Hemoglobin 12.3 (*)     Hematocrit 40.2       (*)     MCH 31.5      MCHC 30.6 (*)     RDW 14.5      Platelet Count 196      % Neutrophils 88      % Lymphocytes 4      % Monocytes 7      % Eosinophils 0      % Basophils 0      % Immature Granulocytes 1      NRBCs per 100 WBC 0      Absolute Neutrophils 12.4 (*)     Absolute Lymphocytes 0.6 (*)     Absolute Monocytes 1.0      Absolute Eosinophils 0.0      Absolute Basophils 0.0      Absolute Immature Granulocytes 0.1      Absolute NRBCs 0.0     UA MACROSCOPIC WITH REFLEX TO MICRO AND CULTURE - Abnormal    Color Urine Straw      Appearance Urine Slightly Cloudy (*)     Glucose Urine Negative      Bilirubin Urine Negative      Ketones Urine Negative      Specific Gravity Urine 1.008      Blood Urine Small (*)     pH Urine 6.5      Protein Albumin Urine 20  (*)     Urobilinogen Urine Normal      Nitrite Urine Negative      Leukocyte Esterase Urine Large (*)     WBC Clumps Urine Present (*)     Mucus Urine Present (*)     RBC Urine >182 (*)     WBC Urine >182 (*)     Squamous Epithelials Urine 2 (*)     Hyaline Casts Urine 4 (*)    ISTAT GASES LACTATE VENOUS POCT - Abnormal    Lactic Acid POCT 2.2  (*)     Bicarbonate Venous POCT 36 (*)     O2 Sat, Venous POCT 29 (*)     pCO2V Venous POCT 60 (*)     pH Venous POCT 7.39      pO2 Venous POCT 20 (*)    ISTAT GASES LACTATE VENOUS POCT - Abnormal    Lactic Acid POCT 3.2 (*)     Bicarbonate Venous POCT 39 (*)     pCO2V Venous POCT 64 (*)     pH Venous POCT 7.40      pO2 Venous POCT <15 (*)    D DIMER QUANTITATIVE - Abnormal    D-Dimer Quantitative 1.20 (*)    PROCALCITONIN - Abnormal    Procalcitonin 0.22 (*)    TROPONIN I - Normal    Troponin I High Sensitivity 9     INFLUENZA A/B & SARS-COV2 PCR MULTIPLEX - Normal    Influenza A PCR Negative      Influenza B PCR Negative      RSV PCR Negative      SARS CoV2 PCR Negative     NT PROBNP INPATIENT - Normal    N terminal Pro BNP Inpatient 546     HEMOGLOBIN A1C   BLOOD CULTURE   BLOOD CULTURE   URINE CULTURE      Emergency Department Course:    Reviewed:  I reviewed nursing notes, vitals and past medical history    Interventions:  Medications   0.9% sodium chloride BOLUS (0 mLs Intravenous Hold 5/1/22 1021)   ipratropium - albuterol 0.5 mg/2.5 mg/3 mL (DUONEB) neb solution 3 mL (has no administration in time range)   piperacillin-tazobactam (ZOSYN) 4.5 g vial to attach to  mL bag (0 g Intravenous Stopped 5/1/22 1256)   methylPREDNISolone sodium succinate (solu-MEDROL) injection 40 mg (40 mg Intravenous Given 5/1/22 1442)   iopamidol (ISOVUE-370) solution 79 mL (79 mLs Intravenous Given 5/1/22 1447)   SalineFlush (101 mLs Intravenous Given 5/1/22 1447)        Disposition:  The patient was admitted to the hospital under the care of Dr. Brizuela    Impression & Plan    CMS Diagnoses:   The patient has signs of Severe Sepsis        If one the following conditions is present, a 30 mL/kg bolus is recommended as part of the 6 hour bundle (IBW can be used for BMI >30, or document refusal/contraindication):      1.   Initial hypotension  defined as 2 bps < 90 or map < 65 in the 6hrs before or 3hrs after time zero.     2.   "Lactate >4.      The patient has signs of Severe Sepsis as evidenced by:    1. 2 SIRS criteria, AND  2. Suspected infection, AND   3. Organ dysfunction: Lactic Acidosis with value >2.0    Time severe sepsis diagnosis confirmed: 1011  05/01/22 as this was the time when Lactate resulted, and the level was > 2.0    3 Hour Severe Sepsis Bundle Completion:    1. Initial Lactic Acid Result:   Recent Labs   Lab Test 05/01/22  1235 05/01/22  1011 11/08/21  1438   LACT 2.2* 3.2* 1.7     2. Blood Cultures before Antibiotics: Yes  3. Broad Spectrum Antibiotics Administered:  yes       Anti-infectives (From admission through now)    Start     Dose/Rate Route Frequency Ordered Stop    05/01/22 1100  piperacillin-tazobactam (ZOSYN) 4.5 g vial to attach to  mL bag        Note to Pharmacy: For [facility], [facility] and [facility]: For Zosyn-naive patients, use the \"Zosyn initial dose + extended infusion\" order panel.    4.5 g  over 30 Minutes Intravenous ONCE 05/01/22 1046 05/01/22 1256          4. Is initial hypotension present?     No (IV fluid bolus NOT required). IV Fluids not administered due to concern for heart failure                    Severe Sepsis reassessment:  1. Repeat Lactic Acid Level within 6 hours of time zero: 2.2  2. MAP>65 after initial IVF bolus, will continue to monitor fluid status and vital signs      Covid-19  Ron Gilmore was evaluated during a global COVID-19 pandemic, which necessitated consideration that the patient might be at risk for infection with the SARS-CoV-2 virus that causes COVID-19.   Applicable protocols for evaluation were followed during the patient's care.   COVID-19 was considered as part of the patient's evaluation. The plan for testing is:  a test was obtained during this visit.    Medical Decision Making:  Patient is a 81-year-old male presents from care facility with increasing shortness of breath and hypoxia as well as mild tachycardia.  Patient's medical history and records were reviewed.  " Initial consideration for, but not limited to, infectious process, metabolic disturbance, PE, electrolyte abnormality, arrhythmia, pleural effusion, aspiration pneumonia/pneumonitis, COPD exacerbation, CHF exacerbation, among others.  Labs, EKG, and imaging was obtained.  EKG demonstrates mild sinus tachycardia without significant change from previous.  Patient denies chest pain and troponin is not significantly elevated; atypical ACS unlikely.  BNP normal and patient does not appear significantly volume overloaded on exam.  COVID and influenza testing negative.  Patient has a Cardona catheter in place and UA is concerning for infection.  Lactic acid significantly elevated (3.2; infection and/or hypoxia) with elevation of white count (14.0).  Given concern for severe sepsis, patient was provided empiric Zosyn after blood and urine cultures were obtained.  Chest x-ray without significant findings.  Patient is noted to be retaining CO2 on VBG; concern for mild COPD exacerbation; provided Solu-Medrol and DuoNeb after discussion with the hospitalist.  D-dimer did return mildly elevated and CTA of the chest was obtained and shows no evidence of PE; see results above.  Procalcitonin elevated (0.22).  Patient's mild hypoxia resolved with 3 L supplemental oxygen.  At this time patient's presentation is consistent with severe sepsis secondary to catheter associated urinary tract infection and hypoxia possibly secondary to mild COPD exacerbation.  Admitted to the hospitalist service.  Patient remained stable throughout my care.    Diagnosis:    ICD-10-CM    1. Acute respiratory failure with hypoxia (H)  J96.01    2. Urinary tract infection associated with indwelling urethral catheter, initial encounter (H)  T83.511A     N39.0    3. Severe sepsis (H)  A41.9     R65.20            Barron Schroeder,   05/01/22 2138

## 2022-05-01 NOTE — ED NOTES
[facility]  ED Nurse Handoff Report    ED Chief complaint: Shortness of Breath      ED Diagnosis:   Final diagnoses:   Acute respiratory failure with hypoxia (H)   Urinary tract infection associated with indwelling urethral catheter, initial encounter (H)       Code Status: Full Code    Allergies: No Known Allergies    Patient Story: Pt lives at [facility], shortness of breath increasing over last few days.  Focused Assessment:  A/Ox4, can make needs known. Pt. On 4lpm NC, tolerates neb well, sats 98%. HR 80-90, no complaints of chest pain. Left side pt does not use, previous stroke. Catheter changed today.    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               Family Comments: Daughter Katherine wants updates  OBS brochure/video discussed/provided to patient/family: N/A              Name of person given brochure if not patient: NA              Relationship to patient: Na    For the majority of the shift this patient's behavior was Green.   Behavioral interventions performed were None.    ED NURSE PHONE NUMBER: [phone]

## 2022-05-01 NOTE — PROGRESS NOTES
RECEIVING UNIT ED HANDOFF REVIEW    ED Nurse Handoff Report was reviewed by: Narda Burkett RN on May 1, 2022 at 5:01 PM

## 2022-05-01 NOTE — PROGRESS NOTES
Skin assessment upon arrival to floor from ED. Coccyx--L buttock dusky and boggy with open area, R buttock non blanchable erythema, open area at top of cleft; mepilex applied and WOC RN order requested for preexisting suspected pressure wound. R outer thigh-- non blanchable erythema; mepilex applied. Bilateral feet-- peeling skin.  Bilateral pedal pulses--not palpable, located by doppler only. BLE--cool and pale. L foot second digit--scab. Intact red skin behind R ear. Bandage on L ear.

## 2022-05-01 NOTE — ED NOTES
DATE:  5/1/2022   TIME OF RECEIPT FROM LAB:  1015  LAB TEST:  lactic  LAB VALUE:  3.43  RESULTS GIVEN WITH READ-BACK TO (PROVIDER):  Barron Schroeder*  TIME LAB VALUE REPORTED TO PROVIDER:   1013

## 2022-05-01 NOTE — ED TRIAGE NOTES
"Pt comes from assisted living, dx with known UTI, has not started ABs yet.  Has chirinos cath.  Reported SOB with productive cough since Friday.  Denies CP. Medics reported sats in 80s on RA.  Improved with 3-4.  Pt has hx stroke, hemiplegia L side.  Gets around in motor scooter.   Tachycardic and tachypnea on arrival - medics reported \"wet lungs.\"  Pt awake, alert x3.    "

## 2022-05-01 NOTE — H&P
[facility]    History and Physical - Hospitalist Service       Date of Admission:  5/1/2022    Assessment & Plan      Ron Gilmore is a 81 year old male who CVA with residual left-sided weakness, COPD, chronic urinary retention, history of septic shock March 2021 secondary to E. 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    1) acute hypoxic respiratory failure likely due to COPD exacerbation and aspiration pneumonia  -On admission presented with shortness of breath along with cough which is productive of sputum and he had tachypnea, tachycardia on arrival and his white count is elevated but his chest x-ray does not show any significant changes  -His COVID-19, influenza and RSV have been negative  -His D-dimer is elevated and CTA chest has been ordered and it shows no PE and emphysematous changes   -On exam patient does not have any edema and I did not appreciate any significant crackles and he is able to speak in full sentences and there are some wheeze  -He may have had microaspiration's given that he is on dysphagia diet and also seems to have  acute Chronic Obstructive Pulmonary Disease Exacerbation.   -Patient was started on Zosyn and was given 40 mg of IV Solu-Medrol and will continue with Zosyn, IV steroids, breathing treatment and wean him off from oxygen  -We will also send for sputum culture    2) likely catheter associated urinary tract infection-POA  -His UA does show  changes consistent with infection and blood cultures have been done and we will await the results of urine culture and he will be continued on Zosyn  -catheter was changed today    3) heart failure with preserved ejection fraction  - On exam he does not edema and his BNP is normal and chest x-ray does not look fluid overload and we will continue the patient with beta-blocker, aspirin, statin and oral Lasix    4) lactic acidosis-improving  -His initial lactate was 3.2 and he was only given oxygen and it has improved to 2.2 and this can be because of hypoxia given the significant improvement and we will recheck again and    5) dysphagia  -We will continue the patient with dysphagia diet and speech will be consulted    6) hyperlipidemia  - we will continue with atorvastatin     7) hypertension  - we will continue with PTA  metoprolol with holding parameter     8) depression  - we will continue with PTA  paxil     9) obesity    10) history of CVA with residual left-sided weakness    11) chronic anemia  -I did review his hemoglobin and it is stable and we will continue with prior to arrival ferrous sulfate    12) gout  -we will continue the patient with prior to arrival allopurinol    13)concern for pressure injury in buttock area-poa  - will consult wound care          Diet: Dysphagia diet  DVT Prophylaxis: Enoxaparin (Lovenox) SQ  Cardona Catheter: PRESENT, indication:    Central Lines: None  Cardiac Monitoring: None  Code Status:  full code     Clinically Significant Risk Factors Present on Admission             # Hypoalbuminemia: Albumin = 2.6 g/dL (Ref range: 3.4 - 5.0 g/dL) on admission, will monitor as appropriate    # Platelet Defect: home medication list includes an antiplatelet medication   # Obesity: Estimated body mass index is 32.55 kg/m  as calculated from the following:    Height as of this encounter: 1.829 m (6').    Weight as of this encounter: 108.9 kg (240 lb).      Disposition Plan   Expected Discharge:  05/04/2022  Anticipated discharge location:  Awaiting care coordination huddle  Delays:           The patient's care was discussed with the Bedside Nurse, Patient, Patient's Family and ED physician. I called his wife and gave her update and she requested to call her daughter and I spoke with daughter see and plan of care was discussed    Mc Brizuela MD  Hospitalist Service  [facility]  Securely message with the Vocera Web Console (learn more here)  Text page via [employer] Paging/Directory     I am on admitting shift and his care in am to be taken over by hospital medicine team     Will order all home meds when verified by pharmacy   ____________________________________________________________________    Chief Complaint     Shortness of breath     History is obtained from the patient    History of Present Illness      Ron Gilmore is a 81 year old male who CVA with residual left-sided weakness, COPD, chronic urinary retention, history of septic shock March 2021 secondary to E. coli bacteremia due to obstructive l UV junction stone with hydronephrosis, status post right side percutaneous tube placement and removal and right side stent placement, hyperlipidemia, depression, diabetes mellitus, dysphagia , Paroxysmal atrial fibrillation not on anticoagulation, History of chronic heart failure with preserved EF.Hypertension, History of pulmonary embolism s/p IVC filter 5/2021, Right peroneal vein DVT Presented to the ED with chief complaint of shortness of breath  Along with productive cough which have been presented since Friday.      Patient at baseline has left-sided hemiplegia and gets around with the help of for scooter and the patient mentioned that cough is productive and he has felt feverish.  He denies any nausea, vomiting, abdominal pain.  He does have chronic Cardona catheter in place.    As per my conversation with the ED physician and review of the EMR patient was short of breath and  was on 80% on room air and he was put on oxygen by EMS and was tachypneic and tachycardic on arrival and medics reported that his lungs were wet.    In the ER patient had work-up done and that he had tachycardia and was requiring oxygen and his blood pressure was stable.  Patient was put on oxygen    He had comprehensive metabolic panel done and his proBNP was 546, troponin was 9 and his initial lactic acid was 3.2.    WBC count was elevated at 14 with left shift with absolute neutrophil count of 12.4 and his hemoglobin was stable at 12.3 along with elevated MCV at 103.  Blood cultures were done in the ED    He had blood gas done initially which showed pH of 7.40 with PCO2 of 64 and he was put on oxygen and repeat blood gas showed pH with of 7.39 with PCO2 of 60 and his repeat lactate has trended down to 2.2.    He also had a UA done which showed leukocyte esterase, WBCs, clumps.  He had chest x-ray done which did not show any acute process    His D-dimer was elevated at 1.20    COVID-19, influenza and RSV they were all negative    cta chest nO PE    He was given  Zosyn and Solu-Medrol and iv fluids and was admitted     Seen in ed room 8 and he said his breathing is better but he was feeling hot    Did give me permission to speak with his wife     Review of Systems    The 10 point Review of Systems is negative other than noted in the HPI or here.     Past Medical History    I have reviewed this patient's medical history and updated it with pertinent information if needed.   Past Medical History:   Diagnosis Date     BPH (benign prostatic hyperplasia)      Cataract      Cholelithiasis      COPD (chronic obstructive pulmonary disease) (H)      Depression      Diabetes mellitus     Type 2     Dyshidrotic foot dermatitis      Edema      Gout      Hyperlipidemia      Hypertension      Infection due to 2019 novel coronavirus 5/1/2021     Kidney stones     Bladder Stones     Lumbago      Lumbar disc displacement without  myelopathy      Muscle weakness      Neuropathy, diabetic (H)      Obesity      Spinal stenosis      Stroke (H)     with residual effects- weakness LUE> LLE     Unsteady gait      Urinary retention with incomplete bladder emptying      UTI (urinary tract infection)      Vasovagal episode        Past Surgical History   I have reviewed this patient's surgical history and updated it with pertinent information if needed.  Past Surgical History:   Procedure Laterality Date     APPENDECTOMY OPEN       ARTHROSCOPY SHOULDER ROTATOR CUFF REPAIR       cataracts Bilateral      CHOLECYSTECTOMY       COLONOSCOPY  1986     COLONOSCOPY N/A 5/29/2021    Procedure: COLONOSCOPY;  Surgeon: Kofi Davis MD;  Location:  GI     CYSTOSCOPY  10/19/2011    Procedure:CYSTOSCOPY; CYSTOSCOPY, BLADDER STONE REMOVAL; Surgeon:ROB SAWYER; Location: OR     CYSTOSCOPY, TRANSURETHRAL RESECTION (TUR) PROSTATE, COMBINED N/A 2/21/2018    Procedure: COMBINED CYSTOSCOPY, TRANSURETHRAL RESECTION (TUR) PROSTATE;  COMBINED CYSTOSCOPY, TRANSURETHRAL RESECTION (TUR) PROSTATE ;  Surgeon: Rob Sawyer MD;  Location:  OR     EP LOOP RECORDER IMPLANT N/A 1/20/2020    Procedure: EP Loop Recorder Implant;  Surgeon: Evgeny Parisi MD;  Location:  [facility]     ESOPHAGOSCOPY, GASTROSCOPY, DUODENOSCOPY (EGD), COMBINED N/A 5/28/2021    Procedure: ESOPHAGOGASTRODUODENOSCOPY (EGD);  Surgeon: Aurora Waterman MD;  Location:  GI     EYE SURGERY      right lid surgery      IR IVC FILTER PLACEMENT  5/24/2021     IR NEPHROSTOMY TUBE PLACEMENT RIGHT  3/9/2021     IR URETERAL STENT PLACEMENT RIGHT  3/16/2021     JOINT REPLACEMENT Right     HIP     KNEE SURGERY Bilateral      LAMINECTOMY LUMBAR ONE LEVEL       LASER HOLMIUM LITHOTRIPSY URETER(S), INSERT STENT, COMBINED Right 4/14/2021    Procedure: CYSTOSCOPY, BLADDER STONE REMOVAL, RIGHT URETEROSCOPY, HOLMIUM LASER LITHOTRIPSY, AND RIGHT STENT REMOVAL, RIGHT RETROGRADE;  Surgeon:  Rob Sullivan MD;  Location: [facility] OR     TONSILLECTOMY         Social History   I have reviewed this patient's social history and updated it with pertinent information if needed.  Social History     Tobacco Use     Smoking status: Former Smoker     Smokeless tobacco: Never Used   Substance Use Topics     Alcohol use: No     Comment: none for 29 yrs     Drug use: No       Family History   I have reviewed this patient's family history and updated it with pertinent information if needed.  Family History   Problem Relation Age of Onset     Prostate Cancer Father        Prior to Admission Medications   Prior to Admission Medications   Prescriptions Last Dose Informant Patient Reported? Taking?   Emollient (AMLACTIN ULTRA EX)  Nursing Home Yes No   Sig: Apply topically daily TO FEET   PARoxetine (PAXIL) 30 MG tablet  Nursing Home Yes No   Sig: Take 30 mg by mouth every morning   Skin Protectants, Misc. (CALAZIME SKIN PROTECTANT) PSTE  Nursing Home Yes No   Sig: Externally apply topically 2 times daily Apply to buttocks topically for Excoriation to buttocks/unstageable Apply thin layer to buttocks  Also taking Apply to buttocks topically as needed for excoriation to butocks unstageable with incontinence episode   acetaminophen (TYLENOL) 325 MG tablet  Nursing Home Yes No   Sig: Take 650 mg by mouth every 4 hours as needed for mild pain as needed for pain/fever Max dose 3000mg/24hr   allopurinol (ZYLOPRIM) 300 MG tablet  Nursing Home Yes No   Sig: Take 300 mg by mouth daily   aspirin (ASA) 81 MG EC tablet  Nursing Home No No   Sig: Take 1 tablet (81 mg) by mouth daily   atorvastatin (LIPITOR) 10 MG tablet  Nursing Home Yes No   Sig: Take 10 mg by mouth daily   cyanocobalamin (VITAMIN B-12) 500 MCG SUBL sublingual tablet  Nursing Home No No   Sig: Place 1 tablet (500 mcg) under the tongue daily   ferrous sulfate (FEROSUL) 325 (65 Fe) MG tablet  Nursing Home Yes No   Sig: Take 325 mg by mouth daily   furosemide (LASIX)  40 MG tablet  Nursing Home No No   Sig: Take 1 tablet (40 mg) by mouth daily   ipratropium-albuterol (COMBIVENT RESPIMAT)  MCG/ACT inhaler  Nursing Home Yes No   Sig: Inhale 1 puff into the lungs 4 times daily 0800, 1200 ,1600 ,2000   metoprolol tartrate (LOPRESSOR) 25 MG tablet  Nursing Home No No   Sig: Take 0.5 tablets (12.5 mg) by mouth 2 times daily   pantoprazole (PROTONIX) 40 MG EC tablet  Nursing Home No No   Sig: Take 1 tablet (40 mg) by mouth 2 times daily (before meals)   polyethylene glycol (MIRALAX) 17 GM/Dose powder  Nursing Home No No   Sig: Take 17 g by mouth daily   Patient taking differently: Take 17 g by mouth daily as needed       Facility-Administered Medications: None     Allergies   No Known Allergies    Physical Exam   Vital Signs: Temp: 98.9  F (37.2  C) Temp src: Temporal BP: (!) 150/74 Pulse: 98   Resp: 26 SpO2: 98 % O2 Device: Nasal cannula Oxygen Delivery: 2 LPM  Weight: 240 lbs 0 oz        General: Patient appears comfortable and in no acute distress.  HEENT: Head is atraumatic, normocephalic.  Pupils are equal, round and reactive to light.  No scleral icterus. Oral mucosa is moist   Neck: Neck is supple and No Lymphadenopathy   Respiratory: Lungs are clear to auscultation bilaterally with mild wheeze and no crackles , able to speak in full sentences and is on 2LNC  Cardiovascular: Regular rate , S1 and S2 normal with no murmer or rubs or gallops  Abdomen:   soft , non tender , non distended and bowel sound present   Skin: No skin rashes or lesions to inspection or palpation.  Neurologic: Higher functions are within normal limits. No obvious defects in speech, language and memory. No facial droop  Musculoskeletal: Normal Range of motion over upper and lower extremities bilaterally except left side which has hemiplegia   Psychiatric: cooperative     Data   Data reviewed today: I reviewed all medications, new labs and imaging results over the last 24 hours. I personally reviewed the  chest CT image(s) showing NO PE.    Recent Labs   Lab 05/01/22  1006   WBC 14.0*   HGB 12.3*   *         POTASSIUM 4.4   CHLORIDE 95   CO2 35*   BUN 19   CR 1.21   ANIONGAP 6   RAJ 9.1   *   ALBUMIN 2.6*   PROTTOTAL 7.6   BILITOTAL 0.8   ALKPHOS 60   ALT 13   AST 11     Most Recent 3 CBC's:Recent Labs   Lab Test 05/01/22  1006 11/10/21  0547 11/09/21  1819 11/09/21  0158 11/08/21  1252   WBC 14.0*  --   --  9.8 11.4*   HGB 12.3* 10.2* 10.2* 10.2* 11.3*   *  --   --  102* 101*     --   --  210 238     Most Recent 3 BMP's:Recent Labs   Lab Test 05/01/22  1006 11/09/21  0832 11/08/21  1252    138 139   POTASSIUM 4.4 4.0 4.0   CHLORIDE 95 103 103   CO2 35* 34* 32   BUN 19 22 21   CR 1.21 0.84 0.89   ANIONGAP 6 1* 4   RAJ 9.1 8.8 9.5   * 131* 175*     14.0 (H)    \    12.3 (L)    /    196   N 88    L N/A    136    95    19 /   ------------------------------------ 230 (H)   ALT 13   AST 11   AP 60   ALB 2.6 (L)   Ca 9.1  4.4    35 (H)    1.21 \    % RETIC N/A    LDH N/A  Troponin N/A    BNP N/A    CK N/A  INR N/A   PTT N/A    D-dimer 1.20 (H)    Fibrinogen N/A    Antithrombin N/A  Ferritin N/A  CRP N/A    IL-6 N/A  Recent Results (from the past 24 hour(s))   Chest XR,  PA & LAT    Narrative    EXAM: XR CHEST 2 VIEW  LOCATION: [facility]  DATE/TIME: 05/01/2022, 11:50 AM    INDICATION: Shortness of breath.  COMPARISON: 11/08/2021.      Impression    IMPRESSION: Cardiac event monitoring device noted. No airspace consolidation, pneumothorax, or pleural effusion.     CT Chest Pulmonary Embolism w Contrast    Narrative    EXAM: CT CHEST PULMONARY EMBOLISM WITH CONTRAST  LOCATION: [facility]  DATE/TIME: 05/01/2022, 2:46 PM    INDICATION: PE suspected, low/intermediate probability, positive D-dimer.  COMPARISON: 05/31/2021  TECHNIQUE: CT chest pulmonary angiogram during arterial phase injection of IV contrast. Multiplanar  reformats and MIP reconstructions were performed. Dose reduction techniques were used.   CONTRAST: 83 mL Isovue 370.    FINDINGS:  ANGIOGRAM CHEST: Pulmonary arteries are normal caliber and negative for pulmonary emboli. Thoracic aorta is normal in caliber and negative for dissection.     LUNGS AND PLEURA: There are a few, scattered granulomata. Moderate emphysematous changes. No effusions.    MEDIASTINUM/AXILLAE: No thoracic or axillary adenopathy. Thyroid and esophagus are unremarkable.    CORONARY ARTERY CALCIFICATION: Severe.    UPPER ABDOMEN: Cholecystectomy.    MUSCULOSKELETAL: No destructive bone lesions.      Impression    IMPRESSION:  1.  No evidence of pulmonary embolism.  2.  Severe coronary atherosclerosis.

## 2022-05-02 ENCOUNTER — APPOINTMENT (OUTPATIENT)
Dept: SPEECH THERAPY | Facility: CLINIC | Age: 82
DRG: 698 | End: 2022-05-02
Attending: HOSPITALIST
Payer: MEDICARE

## 2022-05-02 LAB
ACINETOBACTER SPECIES: NOT DETECTED
ANION GAP SERPL CALCULATED.3IONS-SCNC: 6 MMOL/L (ref 3–14)
BACTERIA UR CULT: ABNORMAL
BUN SERPL-MCNC: 33 MG/DL (ref 7–30)
CALCIUM SERPL-MCNC: 9.6 MG/DL (ref 8.5–10.1)
CHLORIDE BLD-SCNC: 101 MMOL/L (ref 94–109)
CITROBACTER SPECIES: NOT DETECTED
CO2 SERPL-SCNC: 30 MMOL/L (ref 20–32)
CREAT SERPL-MCNC: 1.15 MG/DL (ref 0.66–1.25)
CTX-M: NOT DETECTED
ENTEROBACTER SPECIES: NOT DETECTED
ERYTHROCYTE [DISTWIDTH] IN BLOOD BY AUTOMATED COUNT: 14.2 % (ref 10–15)
ESCHERICHIA COLI: DETECTED
GFR SERPL CREATININE-BSD FRML MDRD: 65 ML/MIN/1.73M2
GLUCOSE BLD-MCNC: 187 MG/DL (ref 70–99)
HCO3 BLDV-SCNC: 39 MMOL/L (ref 21–28)
HCT VFR BLD AUTO: 42.8 % (ref 40–53)
HGB BLD-MCNC: 13.2 G/DL (ref 13.3–17.7)
IMP: NOT DETECTED
KLEBSIELLA OXYTOCA: NOT DETECTED
KLEBSIELLA PNEUMONIAE: NOT DETECTED
KPC: NOT DETECTED
LACTATE BLD-SCNC: 3.2 MMOL/L
MCH RBC QN AUTO: 31.4 PG (ref 26.5–33)
MCHC RBC AUTO-ENTMCNC: 30.8 G/DL (ref 31.5–36.5)
MCV RBC AUTO: 102 FL (ref 78–100)
NDM: NOT DETECTED
OXA (DETECTED/NOT DETECTED): NOT DETECTED
PCO2 BLDV: 64 MM HG (ref 40–50)
PH BLDV: 7.4 [PH] (ref 7.32–7.43)
PLATELET # BLD AUTO: 195 10E3/UL (ref 150–450)
PO2 BLDV: <15 MM HG (ref 25–47)
POTASSIUM BLD-SCNC: 4.1 MMOL/L (ref 3.4–5.3)
PROTEUS SPECIES: NOT DETECTED
PSEUDOMONAS AERUGINOSA: NOT DETECTED
RBC # BLD AUTO: 4.21 10E6/UL (ref 4.4–5.9)
SAO2 % BLDV: ABNORMAL %
SODIUM SERPL-SCNC: 137 MMOL/L (ref 133–144)
VIM: NOT DETECTED
WBC # BLD AUTO: 11.8 10E3/UL (ref 4–11)

## 2022-05-02 PROCEDURE — 92610 EVALUATE SWALLOWING FUNCTION: CPT | Mod: GN | Performed by: SPEECH-LANGUAGE PATHOLOGIST

## 2022-05-02 PROCEDURE — 250N000013 HC RX MED GY IP 250 OP 250 PS 637

## 2022-05-02 PROCEDURE — 250N000011 HC RX IP 250 OP 636: Performed by: HOSPITALIST

## 2022-05-02 PROCEDURE — 36415 COLL VENOUS BLD VENIPUNCTURE: CPT | Performed by: HOSPITALIST

## 2022-05-02 PROCEDURE — 92526 ORAL FUNCTION THERAPY: CPT | Mod: GN | Performed by: SPEECH-LANGUAGE PATHOLOGIST

## 2022-05-02 PROCEDURE — 120N000001 HC R&B MED SURG/OB

## 2022-05-02 PROCEDURE — G0463 HOSPITAL OUTPT CLINIC VISIT: HCPCS

## 2022-05-02 PROCEDURE — 999N000157 HC STATISTIC RCP TIME EA 10 MIN

## 2022-05-02 PROCEDURE — 94640 AIRWAY INHALATION TREATMENT: CPT | Mod: 76

## 2022-05-02 PROCEDURE — 250N000009 HC RX 250: Performed by: HOSPITALIST

## 2022-05-02 PROCEDURE — 80048 BASIC METABOLIC PNL TOTAL CA: CPT | Performed by: HOSPITALIST

## 2022-05-02 PROCEDURE — 85027 COMPLETE CBC AUTOMATED: CPT | Performed by: HOSPITALIST

## 2022-05-02 PROCEDURE — 94640 AIRWAY INHALATION TREATMENT: CPT

## 2022-05-02 PROCEDURE — 99233 SBSQ HOSP IP/OBS HIGH 50: CPT | Performed by: HOSPITALIST

## 2022-05-02 PROCEDURE — 250N000013 HC RX MED GY IP 250 OP 250 PS 637: Performed by: HOSPITALIST

## 2022-05-02 RX ORDER — WATER 10 ML/10ML
INJECTION INTRAMUSCULAR; INTRAVENOUS; SUBCUTANEOUS
Status: DISPENSED
Start: 2022-05-02 | End: 2022-05-02

## 2022-05-02 RX ADMIN — FERROUS SULFATE TAB 325 MG (65 MG ELEMENTAL FE) 325 MG: 325 (65 FE) TAB at 08:58

## 2022-05-02 RX ADMIN — MICONAZOLE NITRATE: 20 POWDER TOPICAL at 10:15

## 2022-05-02 RX ADMIN — IPRATROPIUM BROMIDE AND ALBUTEROL SULFATE 3 ML: .5; 3 SOLUTION RESPIRATORY (INHALATION) at 08:01

## 2022-05-02 RX ADMIN — PIPERACILLIN SODIUM AND TAZOBACTAM SODIUM 4.5 G: 4; .5 INJECTION, POWDER, LYOPHILIZED, FOR SOLUTION INTRAVENOUS at 13:23

## 2022-05-02 RX ADMIN — ASPIRIN 81 MG: 81 TABLET, COATED ORAL at 08:59

## 2022-05-02 RX ADMIN — PIPERACILLIN SODIUM AND TAZOBACTAM SODIUM 4.5 G: 4; .5 INJECTION, POWDER, LYOPHILIZED, FOR SOLUTION INTRAVENOUS at 21:29

## 2022-05-02 RX ADMIN — FUROSEMIDE 40 MG: 40 TABLET ORAL at 08:58

## 2022-05-02 RX ADMIN — IPRATROPIUM BROMIDE AND ALBUTEROL SULFATE 3 ML: .5; 3 SOLUTION RESPIRATORY (INHALATION) at 20:01

## 2022-05-02 RX ADMIN — METHYLPREDNISOLONE SODIUM SUCCINATE 40 MG: 40 INJECTION, POWDER, FOR SOLUTION INTRAMUSCULAR; INTRAVENOUS at 21:29

## 2022-05-02 RX ADMIN — ATORVASTATIN CALCIUM 10 MG: 10 TABLET, FILM COATED ORAL at 08:59

## 2022-05-02 RX ADMIN — PANTOPRAZOLE SODIUM 40 MG: 40 TABLET, DELAYED RELEASE ORAL at 17:18

## 2022-05-02 RX ADMIN — METHYLPREDNISOLONE SODIUM SUCCINATE 40 MG: 40 INJECTION, POWDER, FOR SOLUTION INTRAMUSCULAR; INTRAVENOUS at 10:14

## 2022-05-02 RX ADMIN — Medication 500 MCG: at 08:59

## 2022-05-02 RX ADMIN — PIPERACILLIN SODIUM AND TAZOBACTAM SODIUM 4.5 G: 4; .5 INJECTION, POWDER, LYOPHILIZED, FOR SOLUTION INTRAVENOUS at 06:37

## 2022-05-02 RX ADMIN — IPRATROPIUM BROMIDE AND ALBUTEROL SULFATE 3 ML: .5; 3 SOLUTION RESPIRATORY (INHALATION) at 11:10

## 2022-05-02 RX ADMIN — PANTOPRAZOLE SODIUM 40 MG: 40 TABLET, DELAYED RELEASE ORAL at 06:42

## 2022-05-02 RX ADMIN — ALLOPURINOL 300 MG: 300 TABLET ORAL at 08:59

## 2022-05-02 RX ADMIN — IPRATROPIUM BROMIDE AND ALBUTEROL SULFATE 3 ML: .5; 3 SOLUTION RESPIRATORY (INHALATION) at 23:45

## 2022-05-02 RX ADMIN — ENOXAPARIN SODIUM 40 MG: 40 INJECTION SUBCUTANEOUS at 21:29

## 2022-05-02 RX ADMIN — PAROXETINE HYDROCHLORIDE 50 MG: 10 TABLET, FILM COATED ORAL at 08:59

## 2022-05-02 RX ADMIN — IPRATROPIUM BROMIDE AND ALBUTEROL SULFATE 3 ML: .5; 3 SOLUTION RESPIRATORY (INHALATION) at 15:10

## 2022-05-02 RX ADMIN — MICONAZOLE NITRATE: 20 POWDER TOPICAL at 22:32

## 2022-05-02 RX ADMIN — PIPERACILLIN SODIUM AND TAZOBACTAM SODIUM 4.5 G: 4; .5 INJECTION, POWDER, LYOPHILIZED, FOR SOLUTION INTRAVENOUS at 01:46

## 2022-05-02 RX ADMIN — Medication: at 10:15

## 2022-05-02 ASSESSMENT — ACTIVITIES OF DAILY LIVING (ADL)
TOILETING: 2-->COMPLETELY DEPENDENT
BATHING: 2-->COMPLETELY DEPENDENT (NOT DEVELOPMENTALLY APPROPRIATE)
ADLS_ACUITY_SCORE: 14
ADLS_ACUITY_SCORE: 14
DRESS: 2-->COMPLETELY DEPENDENT (NOT DEVELOPMENTALLY APPROPRIATE)
DIFFICULTY_COMMUNICATING: NO
ADLS_ACUITY_SCORE: 27
DRESSING/BATHING_DIFFICULTY: YES
DOING_ERRANDS_INDEPENDENTLY_DIFFICULTY: YES
ADLS_ACUITY_SCORE: 14
WALKING_OR_CLIMBING_STAIRS_DIFFICULTY: YES
ADLS_ACUITY_SCORE: 24
ADLS_ACUITY_SCORE: 14
TRANSFERRING: 2-->COMPLETELY DEPENDENT (NOT DEVELOPMENTALLY APPROPRIATE)
ADLS_ACUITY_SCORE: 14
TRANSFERRING: 2-->COMPLETELY DEPENDENT
SWALLOWING: 2-->DIFFICULTY SWALLOWING LIQUIDS/FOODS
ADLS_ACUITY_SCORE: 27
CONCENTRATING,_REMEMBERING_OR_MAKING_DECISIONS_DIFFICULTY: NO
ADLS_ACUITY_SCORE: 24
WEAR_GLASSES_OR_BLIND: YES
DRESS: 2-->COMPLETELY DEPENDENT
ADLS_ACUITY_SCORE: 14
TOILETING: 2-->COMPLETELY DEPENDENT (NOT DEVELOPMENTALLY APPROPRIATE)
ADLS_ACUITY_SCORE: 14
ADLS_ACUITY_SCORE: 27
EATING: 1-->ASSISTANCE (EQUIPMENT/PERSON) NEEDED
ADLS_ACUITY_SCORE: 24
ADLS_ACUITY_SCORE: 14
ADLS_ACUITY_SCORE: 14
ADLS_ACUITY_SCORE: 24
ADLS_ACUITY_SCORE: 14
CHANGE_IN_FUNCTIONAL_STATUS_SINCE_ONSET_OF_CURRENT_ILLNESS/INJURY: NO
VISION_MANAGEMENT: READING
TOILETING_ASSISTANCE: TOILETING DIFFICULTY, DEPENDENT
ADLS_ACUITY_SCORE: 14
ADLS_ACUITY_SCORE: 27
SWALLOWING: 2-->DIFFICULTY SWALLOWING LIQUIDS/FOODS
ADLS_ACUITY_SCORE: 14
EATING: 0-->ASSISTANCE NEEDED (DEVELOPMENTALLY APPROPRIATE)
ADLS_ACUITY_SCORE: 24
TOILETING_ISSUES: YES
FALL_HISTORY_WITHIN_LAST_SIX_MONTHS: NO
ADLS_ACUITY_SCORE: 24
DIFFICULTY_EATING/SWALLOWING: YES
HEARING_DIFFICULTY_OR_DEAF: NO
EATING/SWALLOWING_MANAGEMENT: YES
ADLS_ACUITY_SCORE: 14
ADLS_ACUITY_SCORE: 27

## 2022-05-02 NOTE — CONSULTS
WO Nurse Inpatient Pressure Injury Assessment   Reason for consultation: Evaluate and treat Coccyx      ASSESSMENT  Pt does not currently have a pressure injury, pt has a moisture split due to chronic moisture and venous congested buttock tissue   Status: initial assessment  Recommend provider order: None, at this time     TREATMENT PLAN  Buttock/Deondre Cleft wound: BID   1. After any incontinent episode cleanse with brandan cleanse and protect and shanon dry wipes/washcloths.   2. Ensure area is completely dry by blotting and using circular motions, do not wipe as this can cause trauma to the skin   3. Apply thin layer of critic aid barrier paste. Remove only soiled paste, then reapply thin layer. If complete removal is needed use baby/mineral oil (located in pharmacy).   *Avoid pre moisten wipes.   *Avoid use of brief  *Use single covidien pad and limit number of linens underneath patient. Covidien pad can be used as incontinence pad and lift pad    Orders Written  WOC Nurse follow-up plan:weekly  Nursing to notify the Provider(s) and re-consult the WOC Nurse if wound(s) deteriorates or new skin concern.    Patient History  According to provider note(s):  Ron Gilmore is a 81 year old male who CVA with residual left-sided weakness, COPD, chronic urinary retention, history of septic shock 2021 secondary to E. coli bacteremia due to obstructive l UV junction stone with hydronephrosis, status post right side percutaneous tube placement and removal and right side stent placement, hyperlipidemia, depression, diabetes mellitus, dysphagia , Paroxysmal atrial fibrillation not on anticoagulation, History of chronic heart failure with preserved EF,Hypertension, History of pulmonary embolism s/p IVC filter 2021, Right peroneal vein DVT Presented to the ED with chief complaint of shortness of breath  Along with productive cough which have been presented since Friday.    Objective Data  Containment of urine/stool:  Brief and Incontinent pad in bed    Current Diet/ Nutrition:  None      Output:   I/O last 3 completed shifts:  In: -   Out: 800 [Urine:800]    Risk Assessment:   Sensory Perception: 3-->slightly limited  Moisture: 2-->very moist  Activity: 2-->chairfast  Mobility: 2-->very limited  Nutrition: 3-->adequate  Friction and Shear: 1-->problem  Lon Score: 13      Labs:   Recent Labs   Lab 22  1006   ALBUMIN 2.6*   HGB 12.3*   WBC 14.0*   A1C 6.1*       Physical Exam  Skin inspection: focused Buttock, coccyx, heels, back      Wound Location:   Cleft/Buttock  Date of last Photo 22      Wound History: Chronic tissue injury and venous congestion, with split over  cleft and Left buttock. Pt back is very moist due to perspiration  Measurements (length x width x depth, in cm) 0.4cm x 0.3 x 0.1cm over juanjose cleft and 0.3cm x 0.5cm x 0.1cm Left buttock   Wound Base:  100 % dermis, pink  Tunneling N/A  Undermining N/A  Palpation of the wound bed: normal   Periwound skin: intact  Color: dusky and purple, blanchable erythema  Temperature: normal   Drainage: none  Description of drainage: none  Odor: none  Pain: denies , none    Interventions  Current support surface: Standard  Atmos Air mattress ordered pulsate  Current off-loading measures: Pillows  Repositioning aid: Pillows  Visual inspection of wound(s) completed   Tube Securement: cath secure  Wound Care: was done per plan of care.  Supplies: at bedside  Educated provided: Moisture management  Education provided to: patient  and nurse  Discussed importance of:repositioning every 2 hours, off-loading pressure to wound, head elevation <30 degrees, off-loading mattress and moisture management  Discussed plan of care with Patient and Nurse    Ron Grant RN CWOCN

## 2022-05-02 NOTE — PROGRESS NOTES
[facility]    Hospitalist Progress Note      Assessment & Plan   Ron Gilmore is a 81 year old male who CVA with residual left-sided weakness, COPD, chronic urinary retention, history of septic shock March 2021 secondary to E. 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    1) acute hypoxic respiratory failure likely due to COPD exacerbation and aspiration pneumonia  -On admission presented with shortness of breath along with cough which is productive of sputum and he had tachypnea, tachycardia on arrival and his white count is elevated but his chest x-ray does not show any significant changes  -His COVID-19, influenza and RSV have been negative  -His D-dimer is elevated and CTA chest has been ordered and it shows no PE and emphysematous changes   -On exam patient does not have any edema and I did not appreciate any significant crackles and he is able to speak in full sentences and there are some wheeze  -He may have had microaspiration's given that he is on dysphagia diet and also seems to have  acute Chronic Obstructive Pulmonary Disease Exacerbation.   -Remains on O2 per NC, PTA no ambulatory O2.  -Patient was started on Zosyn and was given 40 mg of IV Solu-Medrol and will continue with Zosyn, likely transition to p.o. prednisone tomorrow.  IV steroids, breathing treatment and wean him off from oxygen  - sputum culture not sent.     2) likely catheter associated urinary tract  infection-POA  -His UA does show changes consistent with infection and blood cultures have been done and we will await the results of urine culture and he will be continued on Zosyn  -catheter was changed on admission  -Follow [facility]CS.    3) heart failure with preserved ejection fraction  - On exam he does not edema and his BNP is normal and chest x-ray does not look fluid overload and we will continue the patient with beta-blocker, aspirin, statin and oral Lasix     4) lactic acidosis-improving  -His initial lactate was 3.2 and he was only given oxygen and subsequently repeat improve to 2.2    5) dysphagia  -We will continue the patient with dysphagia diet and speech will be consulted     6) hyperlipidemia  - we will continue with atorvastatin      7) hypertension  - we will continue with PTA  metoprolol with holding parameter      8) depression  - we will continue with PTA  paxil      9) obesity     10) history of CVA with residual left-sided weakness     11) chronic anemia  -I did review his hemoglobin and it is stable and we will continue with prior to arrival ferrous sulfate     12) gout  -we will continue the patient with prior to arrival allopurinol     13)concern for pressure injury in buttock area-poa  - will consult wound care        DVT Prophylaxis: Enoxaparin (Lovenox) SQ  Code Status: Full Code  Expected Discharge:  2+ days pending clinical improvement, safe disposition    Chandrika Saavedra MD  Text Page (7am - 6pm, M-F)    Total unit/floor time 35 minutes:  time consisted of the following assuming Patient care, examination of patient, review of records including labs, imaging results, medications, interdisciplinary notes and completing documentation; > 50% Counseling/discussion with Patient regarding current condition including hypoxia, swallow mechanism and Coordination of Care time with Nursing re aspiration pneumonia, COPD care plan, management and surveillance.     Interval History   Denies cough,  sputum, no cough with eating.  No dysuria, suprapubic or flank pain.  Afebrile    SH: No tobacco.  Lives in group home.    ROS: Complete ROS negative except as above.     -Data reviewed today: I reviewed all new labs and imaging results over the last 24 hours.    Physical Exam   Temp: 97.7  F (36.5  C) Temp src: Oral BP: 114/74 Pulse: 105   Resp: 18 SpO2: 95 % O2 Device: Nasal cannula Oxygen Delivery: 2 LPM  Vitals:    05/01/22 0957 05/01/22 1840   Weight: 108.9 kg (240 lb) 107.4 kg (236 lb 12.8 oz)     Vital Signs with Ranges  Temp:  [97.5  F (36.4  C)-99.4  F (37.4  C)] 97.7  F (36.5  C)  Pulse:  [] 105  Resp:  [16-20] 18  BP: ()/(48-74) 114/74  SpO2:  [92 %-98 %] 95 %  I/O last 3 completed shifts:  In: -   Out: 800 [Urine:800]    General/Constitutional:   NAD, alert, calm, cooperative    HEENT/Head Exam:  atraumatic  Eyes:  PERRL, no conjunctivits  Mouth/Oral Pharynx:  Buccal mucosa WNL  Chest/Respiratory:  Air exchange bilateral lung fields; no rales or wheeze. Respiration nonlabored.  Cardiovascular:  no murmur appreciated.  LE edema trace  Gastrointestinal/Abdomen:  soft, nontender,  no rebound, guarding or other peritoneal signs.  Musculoskeletal:  extremities warm, dry, noncyanotic; no acitve synovitis.  Neuro.  Gross motor tested, nonfocal, sensory intact  Psych oriented, affect calm     Medications       sodium chloride 0.9%  1,000 mL Intravenous Once     allopurinol  300 mg Oral Daily     ammonium lactate   Topical Daily     aspirin  81 mg Oral Daily     atorvastatin  10 mg Oral Daily     cyanocobalamin  500 mcg Sublingual Daily     enoxaparin ANTICOAGULANT  40 mg Subcutaneous Q24H     ferrous sulfate  325 mg Oral Daily     furosemide  40 mg Oral Daily     ipratropium - albuterol 0.5 mg/2.5 mg/3 mL  3 mL Nebulization Q4H     methylPREDNISolone  40 mg Intravenous Q12H     metoprolol tartrate  12.5 mg Oral BID     miconazole   Topical BID     pantoprazole  40 mg Oral BID AC     PARoxetine  50 mg  Oral Daily     piperacillin-tazobactam  4.5 g Intravenous Q6H     sodium chloride (PF)  3 mL Intracatheter Q8H     sterile water (preservative free)           Data   Recent Labs   Lab 05/02/22  0850 05/01/22  1006   WBC 11.8* 14.0*   HGB 13.2* 12.3*   * 103*    196    136   POTASSIUM 4.1 4.4   CHLORIDE 101 95   CO2 30 35*   BUN 33* 19   CR 1.15 1.21   ANIONGAP 6 6   RAJ 9.6 9.1   * 230*   ALBUMIN  --  2.6*   PROTTOTAL  --  7.6   BILITOTAL  --  0.8   ALKPHOS  --  60   ALT  --  13   AST  --  11

## 2022-05-02 NOTE — PROGRESS NOTES
05/02/22 1017   General Information   Onset of Illness/Injury or Date of Surgery 05/01/22   Referring Physician Dr. Brizuela   Patient/Family Therapy Goal Statement (SLP) Stated he has no appetite.   Pertinent History of Current Problem Ron Gilmore is a 81 year old male who CVA with residual left-sided weakness, COPD, chronic urinary retention, history of septic shock March 2021 secondary to E. 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   General Observations Pleasant and cooperative.   Past History of Dysphagia Patient known to this department on multiple admissions. last video was on 3/17/21 and a DDL 2 with nectar thick liquids was recommend at that time.   Type of Evaluation   Type of Evaluation Swallow Evaluation   Oral Motor   Oral Musculature anomalies present   Structural Abnormalities none present   Mucosal Quality adequate   Dentition (Oral Motor)   Comment, Dentition (Oral Motor) Has upper and lower denture but not present at time of the evaluation.   Dentition (Oral Motor) edentulous;dental cavities apparent   Facial Symmetry (Oral Motor)   Facial Symmetry (Oral Motor) left side impairment   Lip Function (Oral Motor)   Lip Range of Motion (Oral Motor) retraction impairment   Lip Strength (Oral Motor) WFL   Lip Coordination (Oral Motor) left side;minimal impairment   Retraction, Lip Range of Motion minimal impairment;left side   Tongue Function (Oral Motor)   Tongue Strength (Oral Motor) WFL    Tongue Coordination/Speed (Oral Motor) slows down progressively   Tongue ROM (Oral Motor) elevation is impaired;lateralization is impaired   Lateralization, Tongue ROM Impairment (Oral Motor) left side;minimal impairment   Elevation, Tongue ROM Impairment (Oral Motor) minimal impairment;bilateral   Jaw Function (Oral Motor)   Jaw Function (Oral Motor) WNL   Cough/Swallow/Gag Reflex (Oral Motor)   Soft Palate/Velum (Oral Motor) WNL   Volitional Throat Clear/Cough (Oral Motor) WNL   Volitional Swallow (Oral Motor) mildly delayed   Vocal Quality/Secretion Management (Oral Motor)   Vocal Quality (Oral Motor) hoarse   General Swallowing Observations   Respiratory Support (General Swallowing Observations) nasal cannula;supplemental oxygen   Current Diet/Method of Nutritional Intake (General Swallowing Observations, NIS) NPO   Swallowing Evaluation Clinical swallow evaluation   Clinical Swallow Evaluation   Feeding Assistance frequent cues/help required   Clinical Swallow Evaluation Textures Trialed mildly thick liquids;pureed;minced & moist   Clinical Swallow Eval: Mildly Thick Liquids   Mode of Presentation cup;spoon;self-fed;fed by clinician   Volume Presented 4 oz of apple juice   Oral Phase premature pharyngeal entry   Pharyngeal Phase impaired;coughing/choking;reduction in laryngeal movement;repeated swallows   Diagnostic Statement Coughed x2 via the straw and by cup with larger bolus size when tilting head back to empty the cup. No coughing via the spoon.   Clinical Swallow Evaluation: Puree Solid Texture Trial   Mode of Presentation, Puree spoon;fed by clinician   Volume of Puree Presented 2 oz of apple sauce   Oral Phase, Puree WFL   Pharyngeal Phase, Puree impaired;repeated swallows   Diagnostic Statement No Sx of aspiration.   Clinical Swallow Eval: Minced & Moist   Mode of Presentation spoon;fed by clinician   Volume Presented 3 teaspoon amounts   Oral Phase delayed AP movement;residue in oral cavity   Oral  Residue mid posterior tongue;soft palate   Pharyngeal Phase impaired   Diagnostic Statement Minimal oral residue with 2 swallows to clear oral cavity.   Swallowing Recommendations   Diet Consistency Recommendations minced & moist (level 5);mildly thick liquids (level 2)   Supervision Level for Intake close supervision needed   Mode of Delivery Recommendations bolus size, small;no straws;slow rate of intake   Postural Recommendations none   Swallowing Maneuver Recommendations alternate food and liquid intake;extra swallow   Monitoring/Assistance Required (Eating/Swallowing) check mouth frequently for oral residue/pocketing;stop eating activities when fatigue is present;monitor for cough or change in vocal quality with intake   Recommended Feeding/Eating Techniques (Swallow Eval) maintain upright sitting position for eating;maintain upright posture during/after eating for 30 minutes;minimize distractions during oral intake;set-up and prepare tray;provide assist with feeding   Medication Administration Recommendations, Swallowing (SLP) crushed or whole in apple sauce.   General Therapy Interventions   Planned Therapy Interventions Dysphagia Treatment   Dysphagia treatment Modified diet education;Instruction of safe swallow strategies   Clinical Impression   Criteria for Skilled Therapeutic Interventions Met (SLP Eval) Yes, treatment indicated   SLP Diagnosis Mild to moderate oral and pharyngeal dysphagia   Risks & Benefits of therapy have been explained evaluation/treatment results reviewed;care plan/treatment goals reviewed;risks/benefits reviewed;current/potential barriers reviewed;participants voiced agreement with care plan;participants included;patient   Clinical Impression Comments Patient presents with mild to moderate oral and pharyngeal dysphagia at bedside, characterized by premature entry of mildly thick liquids with coughing after the swallow via the straw and with larger bolus size via the cup x1. No  coughing via the spoon. Pureed was tolerated without difficulty. Patient did not have his dentures here and was able to sufficiently masticate minced/moist texture with mildly prolonged mastication and only minimal oral residue. Patient is at an elevated risk for aspiration with mildly thick liquids especially via the straw or larger bolus size by cup. Monitor closely and if coughing via the cup present by spoon.   Recommend: 1. IDDSI level 5 minced/moist and mildly thick liquids. 2. Upr right, may need assistance, no straws, small bites/sips, check for oral residue and alternate liquids/solids.   SLP Discharge Planning   SLP Discharge Recommendation Transitional Care Facility;home with home care speech therapy   SLP Rationale for DC Rec Patient's swallow function is below his baseline.   SLP Brief overview of current status  Mild to moderate oral and pharyngeal dysphagia.

## 2022-05-02 NOTE — PHARMACY-ADMISSION MEDICATION HISTORY
Pharmacy Medication History  Admission medication history interview status for the 5/1/2022  admission is complete. See EPIC admission navigator for prior to admission medications     Location of Interview: Phone  Medication history sources: Surescripts and Medication list from [facility] ([phone])    Significant changes made to the medication list:  -Added cipro 250mg BID x 7 days 4/30/22-5/7/22 for UTI  -Changed paroxetine from 30mg to 50mg daily    In the past week, patient estimated taking medication this percent of the time: unable to determine. Nurse at facility said the medications from pill bin for this AM are not there so believe he had AM medications today but not able to confirm.    Medication reconciliation completed by provider prior to medication history? No    Time spent in this activity: 20 min    Prior to Admission medications    Medication Sig Last Dose Taking? Auth Provider   acetaminophen (TYLENOL) 325 MG tablet Take 650 mg by mouth every 4 hours as needed for mild pain as needed for pain/fever Max dose 3000mg/24hr prn Yes Reported, Patient   allopurinol (ZYLOPRIM) 300 MG tablet Take 300 mg by mouth daily ? 5/1/2022 at 0900 Yes Unknown, Entered By History   ammonium lactate (LAC-HYDRIN) 12 % external lotion Apply topically daily Apply thin film to bilateral feet and legs ? 5/1/2022 at 0900 Yes Unknown, Entered By History   aspirin (ASA) 81 MG EC tablet Take 1 tablet (81 mg) by mouth daily ? 5/1/2022 at 0900 Yes Rg Tobin, DO   atorvastatin (LIPITOR) 10 MG tablet Take 10 mg by mouth daily ? 5/1/2022 at 0900 Yes Unknown, Entered By History   ciprofloxacin (CIPRO) 250 MG tablet Take 250 mg by mouth 2 times daily X 7 days 4/30/22-5/7/22 ? 5/1/2022 at 0800 Yes Unknown, Entered By History   cyanocobalamin (VITAMIN B-12) 500 MCG SUBL sublingual tablet Place 1 tablet (500 mcg) under the tongue daily ? 5/1/2022 at 0900 Yes Barron Duque MD   Emollient (AMLACTIN ULTRA  EX) Apply topically daily TO FEET  Yes Reported, Patient   ferrous sulfate (FEROSUL) 325 (65 Fe) MG tablet Take 325 mg by mouth daily ? 5/1/2022 at 0900 Yes Reported, Patient   furosemide (LASIX) 40 MG tablet Take 1 tablet (40 mg) by mouth daily ? 5/1/2022 at 0900 Yes Barron Duque MD   ipratropium-albuterol (COMBIVENT RESPIMAT)  MCG/ACT inhaler Inhale 1 puff into the lungs 4 times daily 0900, 1200 ,1600 ,2000 ? 5/1/2022 at 0900 Yes Reported, Patient   metoprolol tartrate (LOPRESSOR) 25 MG tablet Take 0.5 tablets (12.5 mg) by mouth 2 times daily ? 5/1/2022 at 0900 Yes Romulo Cavanaugh MD   nystatin (MYCOSTATIN) 065757 UNIT/GM external powder Apply topically 2 times daily Apply to red areas in anterior neck twice a day until rash resolves ? 5/1/2022 at 0900 Yes Unknown, Entered By History   pantoprazole (PROTONIX) 40 MG EC tablet Take 1 tablet (40 mg) by mouth 2 times daily (before meals) ? 5/1/2022 at 0900 Yes Rg Tobin, DO   PARoxetine (PAXIL) 10 MG tablet Take 10 mg by mouth every morning Take with 40mg tablet to equal 50mg total ? 5/1/2022 at 0900 Yes Unknown, Entered By History   PARoxetine (PAXIL) 40 MG tablet Take 40 mg by mouth every morning Take with 10mg tablet to equal 50mg total ? 5/1/2022 at 0900 Yes Unknown, Entered By History   polyethylene glycol (MIRALAX) 17 GM/Dose powder Take 17 g by mouth daily  Patient taking differently: Take 17 g by mouth daily as needed prn Yes Romulo Cavanaugh MD   simethicone (MYLICON) 125 MG chewable tablet Take 125 mg by mouth 3 times daily as needed for intestinal gas prn Yes Unknown, Entered By History   Skin Protectants, Misc. (CALAZIME SKIN PROTECTANT) PSTE Externally apply topically 2 times daily Apply to buttocks topically for Excoriation to buttocks/unstageable Apply thin layer to buttocks  Also taking Apply to buttocks topically as needed for excoriation to butocks unstageable with incontinence episode ? 5/1/2022 at 0900 Yes  Reported, Patient       The information provided in this note is only as accurate as the sources available at the time of update(s)

## 2022-05-02 NOTE — PLAN OF CARE
Reason for Admission: SOB, UTI, COPD exacerbation, AMS  Cognitive/Mentation: A/Ox 4  Neuros/CMS: Pt has history of CVA, has L sided hemiplegia, R sided hemiparesis. Decreased sensation on R side. Pt diaphoretic at start of shift. Cool/clammy skin. Hoarse, slow speech.   VS: VSS on 2L NC.   Tele: NSR  GI: BS audible, + flatus, no BM this shift.  : Cardona in place for retention, good output.  Pulmonary: LS diminished.  Pain: Denies.   Drains/Lines: PIV SL  Skin: L buttock dusky and boggy with open area, R buttock non blanchable erythema, open area at top of cleft. Blanchable redness/scab on R thigh. Mepilex applied to both areas. T/R.    Activity: Assist x 2 with lift.  Diet: Diet pending speech eval. Takes pills whole with applesauce.   Discharge: Pending  Aggression Stoplight Tool: Green  End of shift summary: Pt had gram negative bacilli blood culture, detecting E. Coli. R foot drop from previous back surgery.

## 2022-05-02 NOTE — PROGRESS NOTES
SW: Writer received call from Tarik Aleman stating patient has a care coordinator [facility] [phone] and would like to be updated with discharge plans. At this time patient is not ready for discharge.       Will follow.    ARMEN Oconnor, LGSW    [facility]

## 2022-05-02 NOTE — PLAN OF CARE
Goal Outcome Evaluation:    Plan of Care Reviewed With: patient      Patient here with COPD exacerbation, pneumonia, UTI. Alert, oriented times 4. VSS, on 2 L of O2 per nasal cannula. Tele ST. Left side weak, at baseline, history of stroke. Left UE hemiplegia, LLE hemiparesis. Patient bed bound at baseline. Turned and repositioned. Cardona catheter in place. Patient on pulsate mattress  WOC RN following for redness on coccyx.

## 2022-05-03 ENCOUNTER — APPOINTMENT (OUTPATIENT)
Dept: SPEECH THERAPY | Facility: CLINIC | Age: 82
DRG: 698 | End: 2022-05-03
Payer: MEDICARE

## 2022-05-03 PROCEDURE — 999N000157 HC STATISTIC RCP TIME EA 10 MIN

## 2022-05-03 PROCEDURE — 250N000013 HC RX MED GY IP 250 OP 250 PS 637

## 2022-05-03 PROCEDURE — 250N000011 HC RX IP 250 OP 636: Performed by: HOSPITALIST

## 2022-05-03 PROCEDURE — 250N000009 HC RX 250: Performed by: HOSPITALIST

## 2022-05-03 PROCEDURE — 94640 AIRWAY INHALATION TREATMENT: CPT

## 2022-05-03 PROCEDURE — 120N000001 HC R&B MED SURG/OB

## 2022-05-03 PROCEDURE — 92526 ORAL FUNCTION THERAPY: CPT | Mod: GN | Performed by: SPEECH-LANGUAGE PATHOLOGIST

## 2022-05-03 PROCEDURE — 250N000013 HC RX MED GY IP 250 OP 250 PS 637: Performed by: HOSPITALIST

## 2022-05-03 PROCEDURE — 99232 SBSQ HOSP IP/OBS MODERATE 35: CPT | Performed by: HOSPITALIST

## 2022-05-03 PROCEDURE — 94640 AIRWAY INHALATION TREATMENT: CPT | Mod: 76

## 2022-05-03 RX ORDER — PREDNISONE 20 MG/1
40 TABLET ORAL DAILY
Status: DISCONTINUED | OUTPATIENT
Start: 2022-05-04 | End: 2022-05-05 | Stop reason: HOSPADM

## 2022-05-03 RX ADMIN — Medication: at 08:31

## 2022-05-03 RX ADMIN — ALLOPURINOL 300 MG: 300 TABLET ORAL at 08:18

## 2022-05-03 RX ADMIN — PIPERACILLIN SODIUM AND TAZOBACTAM SODIUM 4.5 G: 4; .5 INJECTION, POWDER, LYOPHILIZED, FOR SOLUTION INTRAVENOUS at 08:17

## 2022-05-03 RX ADMIN — IPRATROPIUM BROMIDE AND ALBUTEROL SULFATE 3 ML: .5; 3 SOLUTION RESPIRATORY (INHALATION) at 11:41

## 2022-05-03 RX ADMIN — PANTOPRAZOLE SODIUM 40 MG: 40 TABLET, DELAYED RELEASE ORAL at 16:20

## 2022-05-03 RX ADMIN — Medication 500 MCG: at 08:18

## 2022-05-03 RX ADMIN — PIPERACILLIN SODIUM AND TAZOBACTAM SODIUM 4.5 G: 4; .5 INJECTION, POWDER, LYOPHILIZED, FOR SOLUTION INTRAVENOUS at 21:34

## 2022-05-03 RX ADMIN — MICONAZOLE NITRATE: 20 POWDER TOPICAL at 08:31

## 2022-05-03 RX ADMIN — METHYLPREDNISOLONE SODIUM SUCCINATE 40 MG: 40 INJECTION, POWDER, FOR SOLUTION INTRAMUSCULAR; INTRAVENOUS at 10:45

## 2022-05-03 RX ADMIN — FUROSEMIDE 40 MG: 40 TABLET ORAL at 08:18

## 2022-05-03 RX ADMIN — ENOXAPARIN SODIUM 40 MG: 40 INJECTION SUBCUTANEOUS at 21:38

## 2022-05-03 RX ADMIN — IPRATROPIUM BROMIDE AND ALBUTEROL SULFATE 3 ML: .5; 3 SOLUTION RESPIRATORY (INHALATION) at 23:19

## 2022-05-03 RX ADMIN — ASPIRIN 81 MG: 81 TABLET, COATED ORAL at 08:18

## 2022-05-03 RX ADMIN — METOPROLOL TARTRATE 12.5 MG: 25 TABLET, FILM COATED ORAL at 21:38

## 2022-05-03 RX ADMIN — METOPROLOL TARTRATE 12.5 MG: 25 TABLET, FILM COATED ORAL at 08:17

## 2022-05-03 RX ADMIN — IPRATROPIUM BROMIDE AND ALBUTEROL SULFATE 3 ML: .5; 3 SOLUTION RESPIRATORY (INHALATION) at 19:59

## 2022-05-03 RX ADMIN — PANTOPRAZOLE SODIUM 40 MG: 40 TABLET, DELAYED RELEASE ORAL at 06:51

## 2022-05-03 RX ADMIN — PIPERACILLIN SODIUM AND TAZOBACTAM SODIUM 4.5 G: 4; .5 INJECTION, POWDER, LYOPHILIZED, FOR SOLUTION INTRAVENOUS at 03:07

## 2022-05-03 RX ADMIN — PAROXETINE HYDROCHLORIDE 50 MG: 10 TABLET, FILM COATED ORAL at 08:18

## 2022-05-03 RX ADMIN — FERROUS SULFATE TAB 325 MG (65 MG ELEMENTAL FE) 325 MG: 325 (65 FE) TAB at 08:18

## 2022-05-03 RX ADMIN — MICONAZOLE NITRATE: 20 POWDER TOPICAL at 21:40

## 2022-05-03 RX ADMIN — ATORVASTATIN CALCIUM 10 MG: 10 TABLET, FILM COATED ORAL at 08:18

## 2022-05-03 RX ADMIN — IPRATROPIUM BROMIDE AND ALBUTEROL SULFATE 3 ML: .5; 3 SOLUTION RESPIRATORY (INHALATION) at 07:46

## 2022-05-03 RX ADMIN — PIPERACILLIN SODIUM AND TAZOBACTAM SODIUM 4.5 G: 4; .5 INJECTION, POWDER, LYOPHILIZED, FOR SOLUTION INTRAVENOUS at 14:25

## 2022-05-03 ASSESSMENT — ACTIVITIES OF DAILY LIVING (ADL)
ADLS_ACUITY_SCORE: 27

## 2022-05-03 NOTE — PLAN OF CARE
Alert and oriented x4. VSS on 2L O2 via NC. Total cares, t/r frequently. Tolerating minced and moist diet with mildly thick liquids. LS fine crackles on lower lobes, otherwise diminished. BS+. BM-. Voiding via chirinos. Neuro's unchanged, LUE hemiplegia and LLE hemiparesis. DP pulses weak, palpable; PT pulses present with doppler. Non-blanchable redness on coccyx, excoriation on perineum. Denies pain/ nausea. Tele SR with BBB. Contact precautions maintained. PIV SL.

## 2022-05-03 NOTE — PROGRESS NOTES
[facility]    Hospitalist Progress Note      Assessment & Plan   Ron Gilmore is a 81 year old male who CVA with residual left-sided weakness, COPD, chronic urinary retention, history of septic shock March 2021 secondary to E. 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    1) acute hypoxic respiratory failure likely due to COPD exacerbation and aspiration pneumonia  -On admission presented with shortness of breath along with cough which is productive of sputum and he had tachypnea, tachycardia on arrival and his white count is elevated but his chest x-ray does not show any significant changes  -His COVID-19, influenza and RSV have been negative  -His D-dimer is elevated and CTA chest has been ordered and it shows no PE and emphysematous changes   -On exam patient does not have any edema and I did not appreciate any significant crackles and he is able to speak in full sentences and there are some wheeze  -He may have had microaspiration's given that he is on dysphagia diet and also seems to have  acute Chronic Obstructive Pulmonary Disease Exacerbation.   -Remains on O2 per NC, PTA no ambulatory O2.  Wean as able.  -Patient was started on Zosyn and was given 40 mg of IV Solu-Medrol ;continue with Zosyn, transition to p.o. prednisone 40 mg today.  -Known history aspiration, on dysphagia diet, thickened liquids, compliant.  - sputum culture not sent.     2) likely catheter associated  urinary tract infection-POA  -His UA does show changes consistent with infection and blood cultures have been done and we will await the results of urine culture and he will be continued on Zosyn  -catheter was changed on admission  -UC E. coli, for now continue Zosyn given history of aspiration pneumonia.  On discharge likely transition to Augmentin both respiration ammonia and UTI.    3) heart failure with preserved ejection fraction  - On exam he does not edema and his BNP is normal and chest x-ray does not look fluid overload and we will continue the patient with beta-blocker, aspirin, statin and oral Lasix     4) lactic acidosis-improving  -His initial lactate was 3.2 and he was only given oxygen and subsequently repeat improve to 2.2    5) dysphagia  -We will continue the patient with dysphagia diet and speech will be consulted     6) hyperlipidemia  - we will continue with atorvastatin      7) hypertension  - we will continue with PTA  metoprolol with holding parameter      8) depression  - we will continue with PTA  paxil      9) obesity     10) history of CVA with residual left-sided weakness     11) chronic anemia  -I did review his hemoglobin and it is stable and we will continue with prior to arrival ferrous sulfate     12) gout  -we will continue the patient with prior to arrival allopurinol     13)concern for pressure injury in buttock area-poa  - will consult wound care        DVT Prophylaxis: Enoxaparin (Lovenox) SQ  Code Status: Full Code  Expected Discharge: 1-2 days, wean off IV steroids, wean O2.  Likely back to Alex Cooper MD  Text Page (7am - 6pm, M-F)    I spent 25 minutes on the unit/floor in management of care today reviewing labs, medications, interdisciplinary notes; and completing documentation of encounter and orders with over 50% of my time was spent Counseling the Patient regarding dysphagia/history of aspiration, functional status PTA and Coordinating Care and  plan with Nursing re: aspiration pneumonia/UTI management and surveillance     Interval History   Compliant to dysphagia diet.  Denies cough, sputum.  States PTA was at [facility] requiring Terrie lift for mobility/hemiparesis.     SH: No tobacco.  Lives in group home.    ROS: Complete ROS negative except as above.     -Data reviewed today: I reviewed all new labs and imaging results over the last 24 hours.    Physical Exam   Temp: 97.8  F (36.6  C) Temp src: Oral BP: (!) 142/110 Pulse: 95   Resp: 18 SpO2: 94 % O2 Device: Nasal cannula Oxygen Delivery: 2 LPM  Vitals:    05/01/22 0957 05/01/22 1840   Weight: 108.9 kg (240 lb) 107.4 kg (236 lb 12.8 oz)     Vital Signs with Ranges  Temp:  [97.7  F (36.5  C)-98.1  F (36.7  C)] 97.8  F (36.6  C)  Pulse:  [] 95  Resp:  [18] 18  BP: (105-142)/() 142/110  SpO2:  [92 %-98 %] 94 %  I/O last 3 completed shifts:  In: 600 [P.O.:600]  Out: 900 [Urine:900]    General/Constitutional:    NAD, alert, calm, cooperative    HEENT/Head Exam:  atraumatic  Eyes:  PERRL, no conjunctivits  Mouth/Oral Pharynx:  Buccal mucosa WNL  Chest/Respiratory: No wheeze.  O2 per NC.  Cardiovascular:  no murmur appreciated.  LE edema trace  Gastrointestinal/Abdomen:  soft, nontender,  no rebound, guarding or other peritoneal signs.  Musculoskeletal:  extremities warm, dry, noncyanotic; no acitve synovitis.  Neuro.  Gross motor tested, nonfocal, sensory intact  Psych oriented, affect calm.    Medications       sodium chloride 0.9%  1,000 mL Intravenous Once     allopurinol  300 mg Oral Daily     ammonium lactate   Topical Daily     aspirin  81 mg Oral Daily     atorvastatin  10 mg Oral Daily     cyanocobalamin  500 mcg Sublingual Daily     enoxaparin ANTICOAGULANT  40 mg Subcutaneous Q24H     ferrous sulfate  325 mg Oral Daily     furosemide  40 mg Oral Daily     ipratropium - albuterol 0.5 mg/2.5 mg/3 mL  3 mL Nebulization Q4H     metoprolol tartrate  12.5 mg Oral BID     miconazole   Topical  BID     pantoprazole  40 mg Oral BID AC     PARoxetine  50 mg Oral Daily     piperacillin-tazobactam  4.5 g Intravenous Q6H     [START ON 5/4/2022] predniSONE  40 mg Oral Daily     sodium chloride (PF)  3 mL Intracatheter Q8H       Data   Recent Labs   Lab 05/02/22  0850 05/01/22  1006   WBC 11.8* 14.0*   HGB 13.2* 12.3*   * 103*    196    136   POTASSIUM 4.1 4.4   CHLORIDE 101 95   CO2 30 35*   BUN 33* 19   CR 1.15 1.21   ANIONGAP 6 6   RAJ 9.6 9.1   * 230*   ALBUMIN  --  2.6*   PROTTOTAL  --  7.6   BILITOTAL  --  0.8   ALKPHOS  --  60   ALT  --  13   AST  --  11

## 2022-05-03 NOTE — PROVIDER NOTIFICATION
Text page to Dr. Saavedra    Not sure if pt told you, he wants to change his code status. Also, do we still need to do Neuros? Thank you!

## 2022-05-03 NOTE — PROVIDER NOTIFICATION
MD Notification    Notified Person: MD    Notified Person Name: Ok Davey    Notification Date/Time: 05-03-22 4487    Notification Interaction: amcom    Purpose of Notification: Patient verbalized that he does not want full code and has advance directives at home.    Orders Received:    Comments:       Occupational Therapy    Chart reviewed in prep for skilled OT evaluation; however, pt LORNA for procedure. Will hold evaluation and follow up tomorrow as able and appropriate.     Thank you,  Teresa Solitario, OT

## 2022-05-03 NOTE — PLAN OF CARE
Goal Outcome Evaluation:    Discharge back to [facility] with [facility] Skilled RN services.

## 2022-05-03 NOTE — CONSULTS
Care Management Initial Consult    General Information  Assessment completed with: Care Team Member, [facility] Nurse Shireen  Type of CM/SW Visit: Initial Assessment    Primary Care Provider verified and updated as needed: Yes (Dr. Metcalf-Tarik Aleman Physician)   Readmission within the last 30 days: no previous admission in last 30 days      Reason for Consult: discharge planning  Advance Care Planning:            Communication Assessment  Patient's communication style: spoken language (English or Bilingual)    Hearing Difficulty or Deaf: no   Wear Glasses or Blind: yes    Cognitive  Cognitive/Neuro/Behavioral: .WDL except  Level of Consciousness: alert  Arousal Level: opens eyes spontaneously  Orientation: oriented x 4  Mood/Behavior: calm, cooperative  Best Language: 0 - No aphasia  Speech: clear    Living Environment:   People in home: facility resident     Current living Arrangements: assisted living ([phone])  Name of Facility:  ([facility])   Able to return to prior arrangements: yes       Family/Social Support:  Care provided by: homecare agency, other (see comments) ([facility] staff)  Provides care for: no one, unable/limited ability to care for self  Marital Status:   Wife, Children  Yuli       Description of Support System: Supportive, Involved         Current Resources:   Patient receiving home care services: Yes  [phone][facility]  Community Resources:    Equipment currently used at home: lift device, hospital bed, wheelchair, power  Supplies currently used at home: Incontinence Supplies, Other (chirinos catheter supplies)    Employment/Financial:  Employment Status: retired        Financial Concerns:             Lifestyle & Psychosocial Needs:  Social Determinants of Health     Tobacco Use: Medium Risk     Smoking Tobacco Use: Former Smoker     Smokeless Tobacco Use: Never Used   Alcohol Use: Not on file  "  Financial Resource Strain: Not on file   Food Insecurity: Not on file   Transportation Needs: Not on file   Physical Activity: Not on file   Stress: Not on file   Social Connections: Not on file   Intimate Partner Violence: Not on file   Depression: Not on file   Housing Stability: Not on file       Functional Status:  Prior to admission patient needed assistance:   Dependent ADLs:: Bathing, Dressing, Eating, Grooming, Incontinence, Positioning, Transfers, Wheelchair-with assist, Toileting          Mental Health Status:          Chemical Dependency Status:                Values/Beliefs:  Spiritual, Cultural Beliefs, Spiritism Practices, Values that affect care:                 Additional Information:  Per consult for elevated risk of readmission and chart review indicating pt resides at [facility]. Called [facility] [phone] and spoke to pt's Nurse Shireen.  Per Shireen, pt resides in the [facility] which is the [facility].  Per Shireen,  patient is aleks lift dependent with all transfers and spends time out of bed in a recliner or his Electric wheelchair. Patient receives 3 meals per day and is able to feed himself on most days. Patient is dependent and receives total care for all ADLs and has chirinos cares,safety checks, medication administration and laundry services provided.  Per Shireen, pt is open to [facility] Skilled RN Services for his chirinos catheter.  Patient receives chirinos catheter flushes  3x week and monthly chirinos catheter changes.  Shireen requested any new medications be filled at pt's pharmacy-[facility] and sent with pt.   Shireen requested a \"heads up\" when pt is ready for discharge and discharge orders be faxed to [phone].  Called [facility] [phone] and spoke to Cassidy in Intake.  Cassidy confirmed that pt is open for Skilled RN services and requested discharge orders be faxed  To [phone].  Called to spouse and message left.  Care Coordinator will " continue to follow for discharge needs.  Loida Ocampo, RN  Loida Ocampo RN, BSN, OCN   Inpatient Care Coordination [address]  Office: [phone] a

## 2022-05-04 LAB
BACTERIA BLD CULT: ABNORMAL
CREAT SERPL-MCNC: 1.21 MG/DL (ref 0.66–1.25)
GFR SERPL CREATININE-BSD FRML MDRD: 61 ML/MIN/1.73M2
PLATELET # BLD AUTO: 190 10E3/UL (ref 150–450)

## 2022-05-04 PROCEDURE — 250N000009 HC RX 250: Performed by: HOSPITALIST

## 2022-05-04 PROCEDURE — 250N000013 HC RX MED GY IP 250 OP 250 PS 637

## 2022-05-04 PROCEDURE — 250N000011 HC RX IP 250 OP 636: Performed by: HOSPITALIST

## 2022-05-04 PROCEDURE — 94640 AIRWAY INHALATION TREATMENT: CPT

## 2022-05-04 PROCEDURE — 999N000157 HC STATISTIC RCP TIME EA 10 MIN

## 2022-05-04 PROCEDURE — 250N000013 HC RX MED GY IP 250 OP 250 PS 637: Performed by: HOSPITALIST

## 2022-05-04 PROCEDURE — 94640 AIRWAY INHALATION TREATMENT: CPT | Mod: 76

## 2022-05-04 PROCEDURE — 250N000012 HC RX MED GY IP 250 OP 636 PS 637: Performed by: HOSPITALIST

## 2022-05-04 PROCEDURE — 85049 AUTOMATED PLATELET COUNT: CPT | Performed by: HOSPITALIST

## 2022-05-04 PROCEDURE — 99233 SBSQ HOSP IP/OBS HIGH 50: CPT | Performed by: HOSPITALIST

## 2022-05-04 PROCEDURE — 120N000001 HC R&B MED SURG/OB

## 2022-05-04 PROCEDURE — 36415 COLL VENOUS BLD VENIPUNCTURE: CPT | Performed by: HOSPITALIST

## 2022-05-04 RX ADMIN — IPRATROPIUM BROMIDE AND ALBUTEROL SULFATE 3 ML: .5; 3 SOLUTION RESPIRATORY (INHALATION) at 23:54

## 2022-05-04 RX ADMIN — ASPIRIN 81 MG: 81 TABLET, COATED ORAL at 08:12

## 2022-05-04 RX ADMIN — PIPERACILLIN SODIUM AND TAZOBACTAM SODIUM 4.5 G: 4; .5 INJECTION, POWDER, LYOPHILIZED, FOR SOLUTION INTRAVENOUS at 15:53

## 2022-05-04 RX ADMIN — FUROSEMIDE 40 MG: 40 TABLET ORAL at 08:12

## 2022-05-04 RX ADMIN — METOPROLOL TARTRATE 12.5 MG: 25 TABLET, FILM COATED ORAL at 20:59

## 2022-05-04 RX ADMIN — MICONAZOLE NITRATE: 20 POWDER TOPICAL at 08:13

## 2022-05-04 RX ADMIN — Medication 500 MCG: at 08:13

## 2022-05-04 RX ADMIN — PAROXETINE HYDROCHLORIDE 50 MG: 10 TABLET, FILM COATED ORAL at 08:12

## 2022-05-04 RX ADMIN — FERROUS SULFATE TAB 325 MG (65 MG ELEMENTAL FE) 325 MG: 325 (65 FE) TAB at 08:12

## 2022-05-04 RX ADMIN — IPRATROPIUM BROMIDE AND ALBUTEROL SULFATE 3 ML: .5; 3 SOLUTION RESPIRATORY (INHALATION) at 19:27

## 2022-05-04 RX ADMIN — PANTOPRAZOLE SODIUM 40 MG: 40 TABLET, DELAYED RELEASE ORAL at 06:06

## 2022-05-04 RX ADMIN — IPRATROPIUM BROMIDE AND ALBUTEROL SULFATE 3 ML: .5; 3 SOLUTION RESPIRATORY (INHALATION) at 03:54

## 2022-05-04 RX ADMIN — ALLOPURINOL 300 MG: 300 TABLET ORAL at 08:12

## 2022-05-04 RX ADMIN — PREDNISONE 40 MG: 20 TABLET ORAL at 08:13

## 2022-05-04 RX ADMIN — PIPERACILLIN SODIUM AND TAZOBACTAM SODIUM 4.5 G: 4; .5 INJECTION, POWDER, LYOPHILIZED, FOR SOLUTION INTRAVENOUS at 03:18

## 2022-05-04 RX ADMIN — PANTOPRAZOLE SODIUM 40 MG: 40 TABLET, DELAYED RELEASE ORAL at 15:53

## 2022-05-04 RX ADMIN — ATORVASTATIN CALCIUM 10 MG: 10 TABLET, FILM COATED ORAL at 08:12

## 2022-05-04 RX ADMIN — ENOXAPARIN SODIUM 40 MG: 40 INJECTION SUBCUTANEOUS at 20:59

## 2022-05-04 RX ADMIN — IPRATROPIUM BROMIDE AND ALBUTEROL SULFATE 3 ML: .5; 3 SOLUTION RESPIRATORY (INHALATION) at 16:07

## 2022-05-04 RX ADMIN — PIPERACILLIN SODIUM AND TAZOBACTAM SODIUM 4.5 G: 4; .5 INJECTION, POWDER, LYOPHILIZED, FOR SOLUTION INTRAVENOUS at 20:59

## 2022-05-04 RX ADMIN — METOPROLOL TARTRATE 12.5 MG: 25 TABLET, FILM COATED ORAL at 08:12

## 2022-05-04 RX ADMIN — IPRATROPIUM BROMIDE AND ALBUTEROL SULFATE 3 ML: .5; 3 SOLUTION RESPIRATORY (INHALATION) at 07:37

## 2022-05-04 RX ADMIN — PIPERACILLIN SODIUM AND TAZOBACTAM SODIUM 4.5 G: 4; .5 INJECTION, POWDER, LYOPHILIZED, FOR SOLUTION INTRAVENOUS at 08:12

## 2022-05-04 RX ADMIN — IPRATROPIUM BROMIDE AND ALBUTEROL SULFATE 3 ML: .5; 3 SOLUTION RESPIRATORY (INHALATION) at 11:08

## 2022-05-04 RX ADMIN — MICONAZOLE NITRATE: 20 POWDER TOPICAL at 21:05

## 2022-05-04 RX ADMIN — Medication: at 08:13

## 2022-05-04 ASSESSMENT — ACTIVITIES OF DAILY LIVING (ADL)
ADLS_ACUITY_SCORE: 27

## 2022-05-04 NOTE — PROGRESS NOTES
[facility]    Hospitalist Progress Note      Assessment & Plan   Ron Gilmore is a 81 year old male who CVA with residual left-sided weakness, COPD, chronic urinary retention, history of septic shock March 2021 secondary to E. 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    1) acute hypoxic respiratory failure likely due to COPD exacerbation and aspiration pneumonia  -On admission presented with shortness of breath along with cough which is productive of sputum and he had tachypnea, tachycardia on arrival and his white count is elevated but his chest x-ray does not show any significant changes  -His COVID-19, influenza and RSV have been negative  -His D-dimer is elevated and CTA chest has been ordered and it shows no PE and emphysematous changes   -On exam patient does not have any edema and I did not appreciate any significant crackles and he is able to speak in full sentences and there are some wheeze  -He may have had microaspiration's given that he is on dysphagia diet and also seems to have  acute Chronic Obstructive Pulmonary Disease Exacerbation.   -Remains on O2 per NC, PTA no ambulatory O2.  -Patient was started on Zosyn and was given 40 mg of IV Solu-Medrol and will continue with Zosyn, transition off IV Solu-Medrol to prednisone p.o. today, x3 days.  - sputum culture not sent.  -Wean O2 as able.  Baseline patient nonambulatory, Terrie lift for transitions.     2) likely  catheter associated urinary tract infection-POA  -His UA does show changes consistent with infection and blood cultures have been done and we will await the results of urine culture and he will be continued on Zosyn  -catheter was changed on admission  -UC/S, Proteus mirabilis.  Pansensitive except for nitrofurantoin.  Continues on Zosyn as above for aspiration pneumonia.  On discharge likely transition to Augmentin.    3) heart failure with preserved ejection fraction  - On exam he does not have edema and his BNP is normal and chest x-ray does not look fluid overload and we will continue the patient with beta-blocker, aspirin, statin and oral Lasix     4) lactic acidosis-improving  -His initial lactate was 3.2 and he was only given oxygen and subsequently repeat improve to 2.2    5) dysphagia  -We will continue the patient with dysphagia diet      6) hyperlipidemia  - we will continue with atorvastatin      7) hypertension  - we will continue with PTA  metoprolol with holding parameter      8) depression  - we will continue with PTA  paxil      9) obesity     10) history of CVA with residual left-sided weakness     11) chronic anemia  -I did review his hemoglobin and it is stable and we will continue with prior to arrival ferrous sulfate     12) gout  -we will continue the patient with prior to arrival allopurinol     13)concern for pressure injury in buttock area-poa  - will consult wound care     14) care plan.  5/4/2022 patient would like to discuss his CODE STATUS.  He clearly articulates and states he has ACP documents indicating wishes for resuscitation however no intubation.  Nurse present during discussion.  CODE STATUS changed to honor patient's wishes.       DVT Prophylaxis: Enoxaparin (Lovenox) SQ  Code Status: Special Code  Expected Discharge:  Likely tomorrow discharge back to [facility]. Wean O2    Chandrika Saavedra MD  Text Page (7am - 6pm, M-F)    Total unit/floor time 35 minutes:  time  consisted of the following, examination of patient, review of records including labs, imaging results, medications, interdisciplinary notes and completing documentation; > 50% Counseling/discussion with Patient regarding current condition including pneumonia; discussion on Code Status, see above; and Coordination of Care time with Nursing regarding code status and discharge care plan, management and surveillance.      Interval History   Denies dyspnea, cough, sputum.  Tolerating diet, no cough with eating.     SH: No tobacco.  Lives at [facility].    ROS: Complete ROS negative except as above.     -Data reviewed today: I reviewed all new labs and imaging results over the last 24 hours.    Physical Exam   Temp: 97.4  F (36.3  C) Temp src: Oral BP: 123/77 Pulse: 78   Resp: 20 SpO2: 94 % O2 Device: Nasal cannula Oxygen Delivery: 1 LPM  Vitals:    05/01/22 0957 05/01/22 1840   Weight: 108.9 kg (240 lb) 107.4 kg (236 lb 12.8 oz)     Vital Signs with Ranges  Temp:  [97.4  F (36.3  C)-98.6  F (37  C)] 97.4  F (36.3  C)  Pulse:  [70-97] 78  Resp:  [18-20] 20  BP: (111-132)/(52-79) 123/77  SpO2:  [94 %-100 %] 94 %  I/O last 3 completed shifts:  In: 480 [P.O.:480]  Out: 1360 [Urine:1360]    General/Constitutional:    NAD, alert, calm, cooperative    HEENT/Head Exam:  atraumatic  Eyes:  PERRL, no conjunctivits  Mouth/Oral Pharynx:  Buccal mucosa WNL  Chest/Respiratory:  Air exchange bilateral lung fields; no rales or wheeze. Respiration nonlabored on O2 pre NC .  Cardiovascular:  no murmur appreciated.  LE edema trace  Gastrointestinal/Abdomen:  soft, nontender,  no rebound, guarding or other peritoneal signs.  Musculoskeletal:  extremities warm, dry, noncyanotic; no acitve synovitis.  Neuro.  Gross motor tested, nonfocal, sensory intact  Psych oriented, affect calm     Medications       sodium chloride 0.9%  1,000 mL Intravenous Once     allopurinol  300 mg Oral Daily     ammonium lactate   Topical Daily     aspirin  81 mg  Oral Daily     atorvastatin  10 mg Oral Daily     cyanocobalamin  500 mcg Sublingual Daily     enoxaparin ANTICOAGULANT  40 mg Subcutaneous Q24H     ferrous sulfate  325 mg Oral Daily     furosemide  40 mg Oral Daily     ipratropium - albuterol 0.5 mg/2.5 mg/3 mL  3 mL Nebulization Q4H     metoprolol tartrate  12.5 mg Oral BID     miconazole   Topical BID     pantoprazole  40 mg Oral BID AC     PARoxetine  50 mg Oral Daily     piperacillin-tazobactam  4.5 g Intravenous Q6H     predniSONE  40 mg Oral Daily     sodium chloride (PF)  3 mL Intracatheter Q8H       Data   Recent Labs   Lab 05/04/22  0848 05/02/22  0850 05/01/22  1006   WBC  --  11.8* 14.0*   HGB  --  13.2* 12.3*   MCV  --  102* 103*    195 196   NA  --  137 136   POTASSIUM  --  4.1 4.4   CHLORIDE  --  101 95   CO2  --  30 35*   BUN  --  33* 19   CR  --  1.15 1.21  1.21   ANIONGAP  --  6 6   RAJ  --  9.6 9.1   GLC  --  187* 230*   ALBUMIN  --   --  2.6*   PROTTOTAL  --   --  7.6   BILITOTAL  --   --  0.8   ALKPHOS  --   --  60   ALT  --   --  13   AST  --   --  11

## 2022-05-04 NOTE — PLAN OF CARE
[id number]: Patient alert and oriented, very pleasant. VSS on room air. Up with lift. Tolerating minced and moist diet, mildly thick liquids. Plan to continue IV antibiotics. Discharge pending.

## 2022-05-04 NOTE — PLAN OF CARE
Reason for Admission: COPD exacerbation, pneumonia, UTI  Cognitive/Mentation: A/Ox 4  Neuros/CMS: Stable, L sided hemiplegia from past stroke. R sided weakness and decreased sensation. Hoarse, slow speech.  VS: VSS on 2L NC.   GI: BS audible, + flatus, no BM this shift.  : Cardona in place for retention, good output.   Pulmonary: LS diminished.  Pain: Denies.   Drains/Lines: PIV SL  Skin: Intact, non-blanchable redness on buttocks/sacrum/coccyx. Skin barrier applied. T/R.   Activity: Assist x 2 with lift device.  Diet: Minced and moist diet with mildly thick liquids. Takes pills whole in applesauce.   Discharge: Pending  Aggression Stoplight Tool: Green  End of shift summary: DP pulses weak, palpable. R foot drop from previous back surgery. On contact precautions for VRE.

## 2022-05-04 NOTE — PLAN OF CARE
Goal Outcome Evaluation:      Date: May 4, 2022  Shift: [id number]  Name: Ron Gilmore  Age: 81 year old  YOB: 1940    Reason for Admission: Acute respiratory failure with hypoxia (H) [J96.01]  Urinary tract infection associated with indwelling urethral catheter, initial encounter (H) [T83.511A, N39.0]     Cognitive/Mentation: A&Ox4  Neuros/CMS: L side hemiplegia, has slight sensation on L side. 3/5 RUE, 2/5 RLE.     VS: Stable on RA/.5 NC  Cardiac: WNL  GI: +BS, -flatus, no BM  : Cardona for retention  Pulmonary: LS dim  Pain: denies     Drains: Cardona PIV SL  Skin: Redness to sacrum/buttock  Activity: Turn and repo, Ax2 with lift    Diet: Minced Moist, Mildly thickened liquids     Therapies recs: WOCN/ST  Discharge: Pending

## 2022-05-05 ENCOUNTER — APPOINTMENT (OUTPATIENT)
Dept: SPEECH THERAPY | Facility: CLINIC | Age: 82
DRG: 698 | End: 2022-05-05
Payer: MEDICARE

## 2022-05-05 VITALS
WEIGHT: 236.8 LBS | DIASTOLIC BLOOD PRESSURE: 77 MMHG | SYSTOLIC BLOOD PRESSURE: 133 MMHG | RESPIRATION RATE: 18 BRPM | HEIGHT: 72 IN | BODY MASS INDEX: 32.07 KG/M2 | TEMPERATURE: 98.4 F | HEART RATE: 73 BPM | OXYGEN SATURATION: 96 %

## 2022-05-05 LAB
CREAT SERPL-MCNC: 1.13 MG/DL (ref 0.66–1.25)
ERYTHROCYTE [DISTWIDTH] IN BLOOD BY AUTOMATED COUNT: 14.6 % (ref 10–15)
GFR SERPL CREATININE-BSD FRML MDRD: 66 ML/MIN/1.73M2
HCT VFR BLD AUTO: 39.9 % (ref 40–53)
HGB BLD-MCNC: 12 G/DL (ref 13.3–17.7)
MCH RBC QN AUTO: 30.6 PG (ref 26.5–33)
MCHC RBC AUTO-ENTMCNC: 30.1 G/DL (ref 31.5–36.5)
MCV RBC AUTO: 102 FL (ref 78–100)
NT-PROBNP SERPL-MCNC: 511 PG/ML (ref 0–1800)
PLATELET # BLD AUTO: 191 10E3/UL (ref 150–450)
RBC # BLD AUTO: 3.92 10E6/UL (ref 4.4–5.9)
WBC # BLD AUTO: 10 10E3/UL (ref 4–11)

## 2022-05-05 PROCEDURE — 83880 ASSAY OF NATRIURETIC PEPTIDE: CPT | Performed by: HOSPITALIST

## 2022-05-05 PROCEDURE — 250N000013 HC RX MED GY IP 250 OP 250 PS 637: Performed by: HOSPITALIST

## 2022-05-05 PROCEDURE — 36415 COLL VENOUS BLD VENIPUNCTURE: CPT | Performed by: HOSPITALIST

## 2022-05-05 PROCEDURE — 85027 COMPLETE CBC AUTOMATED: CPT | Performed by: HOSPITALIST

## 2022-05-05 PROCEDURE — 99239 HOSP IP/OBS DSCHRG MGMT >30: CPT | Performed by: HOSPITALIST

## 2022-05-05 PROCEDURE — 250N000011 HC RX IP 250 OP 636: Performed by: HOSPITALIST

## 2022-05-05 PROCEDURE — 250N000013 HC RX MED GY IP 250 OP 250 PS 637

## 2022-05-05 PROCEDURE — 94640 AIRWAY INHALATION TREATMENT: CPT

## 2022-05-05 PROCEDURE — 250N000009 HC RX 250: Performed by: HOSPITALIST

## 2022-05-05 PROCEDURE — 94640 AIRWAY INHALATION TREATMENT: CPT | Mod: 76

## 2022-05-05 PROCEDURE — 92526 ORAL FUNCTION THERAPY: CPT | Mod: GN

## 2022-05-05 PROCEDURE — 250N000012 HC RX MED GY IP 250 OP 636 PS 637: Performed by: HOSPITALIST

## 2022-05-05 PROCEDURE — 82565 ASSAY OF CREATININE: CPT | Performed by: HOSPITALIST

## 2022-05-05 PROCEDURE — 999N000157 HC STATISTIC RCP TIME EA 10 MIN

## 2022-05-05 RX ORDER — PREDNISONE 20 MG/1
40 TABLET ORAL DAILY
Qty: 1 TABLET | Refills: 0 | Status: SHIPPED | OUTPATIENT
Start: 2022-05-06 | End: 2022-09-23

## 2022-05-05 RX ADMIN — FERROUS SULFATE TAB 325 MG (65 MG ELEMENTAL FE) 325 MG: 325 (65 FE) TAB at 09:38

## 2022-05-05 RX ADMIN — FUROSEMIDE 40 MG: 40 TABLET ORAL at 09:39

## 2022-05-05 RX ADMIN — MICONAZOLE NITRATE: 20 POWDER TOPICAL at 09:38

## 2022-05-05 RX ADMIN — ALLOPURINOL 300 MG: 300 TABLET ORAL at 09:40

## 2022-05-05 RX ADMIN — PIPERACILLIN SODIUM AND TAZOBACTAM SODIUM 4.5 G: 4; .5 INJECTION, POWDER, LYOPHILIZED, FOR SOLUTION INTRAVENOUS at 09:41

## 2022-05-05 RX ADMIN — ATORVASTATIN CALCIUM 10 MG: 10 TABLET, FILM COATED ORAL at 09:39

## 2022-05-05 RX ADMIN — PREDNISONE 40 MG: 20 TABLET ORAL at 09:40

## 2022-05-05 RX ADMIN — Medication 500 MCG: at 09:40

## 2022-05-05 RX ADMIN — PIPERACILLIN SODIUM AND TAZOBACTAM SODIUM 4.5 G: 4; .5 INJECTION, POWDER, LYOPHILIZED, FOR SOLUTION INTRAVENOUS at 03:05

## 2022-05-05 RX ADMIN — METOPROLOL TARTRATE 12.5 MG: 25 TABLET, FILM COATED ORAL at 09:40

## 2022-05-05 RX ADMIN — IPRATROPIUM BROMIDE AND ALBUTEROL SULFATE 3 ML: .5; 3 SOLUTION RESPIRATORY (INHALATION) at 04:21

## 2022-05-05 RX ADMIN — PANTOPRAZOLE SODIUM 40 MG: 40 TABLET, DELAYED RELEASE ORAL at 09:41

## 2022-05-05 RX ADMIN — ASPIRIN 81 MG: 81 TABLET, COATED ORAL at 09:39

## 2022-05-05 RX ADMIN — PANTOPRAZOLE SODIUM 40 MG: 40 TABLET, DELAYED RELEASE ORAL at 16:25

## 2022-05-05 RX ADMIN — PAROXETINE HYDROCHLORIDE 50 MG: 10 TABLET, FILM COATED ORAL at 09:40

## 2022-05-05 RX ADMIN — Medication: at 09:37

## 2022-05-05 RX ADMIN — IPRATROPIUM BROMIDE AND ALBUTEROL SULFATE 3 ML: .5; 3 SOLUTION RESPIRATORY (INHALATION) at 07:31

## 2022-05-05 ASSESSMENT — ACTIVITIES OF DAILY LIVING (ADL)
ADLS_ACUITY_SCORE: 27
ADLS_ACUITY_SCORE: 25
ADLS_ACUITY_SCORE: 27
ADLS_ACUITY_SCORE: 27
ADLS_ACUITY_SCORE: 25
ADLS_ACUITY_SCORE: 27
ADLS_ACUITY_SCORE: 25
ADLS_ACUITY_SCORE: 27
ADLS_ACUITY_SCORE: 25
ADLS_ACUITY_SCORE: 27

## 2022-05-05 NOTE — PLAN OF CARE
/63 (BP Location: Left arm)   Pulse 72   Temp 97.7  F (36.5  C) (Oral)   Resp 18   Ht 1.829 m (6')   Wt 107.4 kg (236 lb 12.8 oz)   SpO2 97%   BMI 32.12 kg/m      Patient is currently here with an exacerbation of COPD, pneumonia, and a urinary tract infection. Patient is alert and oriented, neurologically intact aside from a right foot drop at baseline, left-sided hemiparesis, generalized weakness and forgetfulness. VSS on two liters per minute via nasal cannula at baseline. Telemetry readings reveal a normal sinus rhythm. Patient is currently on an easy to chew diet with mildly-thickened liquids. Takes pills whole or crushed in applesauce. Patient transfers and ambulates with an assist of two plus the utilization of a lift device. Patient is incontinent of both bladder and bowel, currently has an indwelling chirinos catheter in place for retention that is patent and is displaying adequate urinary output. Patient's skin is intact and in good condition aside from scattered bruising as well as blanchable redness on the patient's sacrum/coccyx. Denies pain. Patient is currently scoring green on the Aggression Stop Light Tool. Plan to continue to assess for any potential neurological or cardiopulmonary changes. Discharge plans pending.

## 2022-05-05 NOTE — DISCHARGE SUMMARY
[facility]    Discharge Summary  Hospitalist    Date of Admission:  5/1/2022  Date of Discharge:  5/5/2022  Discharging Provider: Chandrika Saavedra MD  Date of Service (when I saw the patient): 05/05/22      History of Present Illness   Ron Gilmore is a 81 year old male who CVA with residual left-sided weakness, COPD, chronic urinary retention, history of septic shock March 2021 secondary to E. coli bacteremia due to obstructive l UV junction stone with hydronephrosis, status post right side percutaneous tube placement and removal and right side stent placement, hyperlipidemia, depression, diabetes mellitus, dysphagia , Paroxysmal atrial fibrillation not on anticoagulation, History of chronic heart failure with preserved EF,Hypertension, History of pulmonary embolism s/p IVC filter 5/2021, Right peroneal vein DVT Presented to the ED with chief complaint of shortness of breath     Hospital Course   Ron Gilmore was admitted on 5/1/2022.  The following problems were addressed during his hospitalization:    1) acute hypoxic respiratory failure likely due to COPD exacerbation and aspiration pneumonia  Sepsis by definition: Tachycardia up to 105;Tachypnea up to 27;Concurrent Organ Failure  acute respiratory failure with hypoxia. Elevated LA  -On admission presented with shortness of breath along with cough which is productive of sputum and he had tachypnea, tachycardia on arrival and his white count is elevated but his chest x-ray does not show any significant changes, Known dysphagia on DD.   -His COVID-19, influenza and RSV have been negative  -His D-dimer is elevated and CTA chest has been ordered and it shows no PE and emphysematous changes   -On admission; no edema.   -Remains on O2 per NC,  Patient states PTA on O2 at night  -Patient was started on Zosyn and was given 40 mg of IV Solu-Medrol;  transition off IV Solu-Medrol to prednisone p.o. today, x3 days.  -Baseline patient  nonambulatory, Terrie lift for transitions.  Has Home Health Care RN care plan PTA, will resume.  [facility] SLP added.  Face-to-face for ambulatory O2, see below.  Discharge back to [facility], resumption of prior care plan; transition to Augmentin to complete course, prednisone course completed after additional 1 day dosing.  Follow-up with PCP within 1 week of hospital discharge with BMP at that time.  CAUTI  Aspiration PNA       2) likely catheter associated urinary tract infection-POA  -His UA does show changes consistent with infection and blood cultures have been done and we will await the results of urine culture and he will be continued on Zosyn  -catheter was changed on admission  -UC/S, Proteus mirabilis.  Pansensitive except for nitrofurantoin.  Continues on Zosyn as above for aspiration pneumonia.  On discharge  transition to Augmentin, see #1.     3) heart failure with preserved ejection fraction  - On exam he does not have edema and his BNP is normal and chest x-ray does not look fluid overload and we will continue the patient with PTA beta-blocker, aspirin, statin and oral Lasix     4) lactic acidosis-resolved.  -His initial lactate was 3.2 and he was only given oxygen and subsequently repeat improve to 2.2     5) dysphagia  -We will continue the patient with dysphagia diet.  Orders for [facility] SLP to follow at [facility].  Patient knowledgeable and compliant regarding DD.     6) hyperlipidemia  - we will continue with atorvastatin      7) hypertension  - we will continue with PTA  metoprolol      8) depression  - we will continue with PTA  paxil      9) obesity     10) history of CVA with residual left-sided weakness.  Noted, no new or changed S/S.     11) chronic anemia  -I did review his hemoglobin and it is stable and we will continue with prior to arrival ferrous sulfate     12) gout  -we will continue the patient with prior to arrival allopurinol     Code Status   CPR; no intubation       Primary  Care Physician   Kalen Metcalf    General/Constitutional:     NAD, alert, calm, cooperative     HEENT/Head Exam:  atraumatic  Eyes:  PERRL, no conjunctivits  Mouth/Oral Pharynx:  Buccal mucosa WNL  Chest/Respiratory:  Air exchange bilateral lung fields; no rales or wheeze. Respiration nonlabored on O2 2L per NC .  Cardiovascular:  no murmur appreciated.  LE edema trace  Gastrointestinal/Abdomen:  soft, nontender,  no rebound, guarding or other peritoneal signs.  Musculoskeletal:  extremities warm, dry, noncyanotic; no acitve synovitis.  Neuro.  Gross motor tested, nonfocal, sensory intact  Psych oriented, affect calm     Discharge Disposition   Discharged to [facility]    Condition at discharge: Fair    Consultations This Hospital Stay   SPEECH LANGUAGE PATH ADULT IP CONSULT  WOUND OSTOMY CONTINENCE NURSE  IP CONSULT  CARE MANAGEMENT / SOCIAL WORK IP CONSULT    Time Spent on this Encounter   IChandrika MD, personally saw the patient today and spent greater than 30 minutes discharging this patient discussing discharge plans with Patient and Care Coordinator; face-to-face documentation for Home Health Care SLP, resume prior care plan for [facility]; face-to-face documentation for ambulatory O2; complete discharge orders, medications and follow-up.    Discharge Orders      Medication Therapy Management Referral      Home Care Referral      Reason for your hospital stay    Presented with shortness of breath     Follow-up and recommended labs and tests     Follow up with primary care provider, Kalen Metcalf, within 7 days for hospital follow- up.  The following labs/tests are recommended: BMP, CBC.     Activity    Your activity upon discharge: resume prior to admission care plan; activity as tolerated with assistance     Resume Home Care Services     Oxygen Adult/Peds    Oxygen Documentation:   I certify that this patient, Ron Gilmore has been under my care (or a nurse practitioner or physican's  assistant working with me). This is the face-to-face encounter for oxygen medical necessity.      Ron Gilmore is now in a chronic stable state and continues to require supplemental oxygen. Patient has continued oxygen desaturation due to COPD J44.9.    Alternative treatment(s) tried or considered and deemed clinically infective for treatment of COPD J44.9 include nebulizers, inhalers, and steroids.  If portability is ordered, is the patient mobile within the home? yes    **Patients who qualify for home O2 coverage under the CMS guidelines require ABG tests or O2 sat readings obtained closest to, but no earlier than 2 days prior to the discharge, as evidence of the need for home oxygen therapy. Testing must be performed while patient is in the chronic stable state. See notes for O2 sats.**     Diet    Follow this diet upon discharge:       Snacks/Supplements Adult: Ensure Enlive; With Meals      Combination Diet Easy to Chew (level 7); Mildly Thick (level 2) (No straws)     Discharge Medications   Current Discharge Medication List      START taking these medications    Details   amoxicillin-clavulanate (AUGMENTIN) 875-125 MG tablet Take 1 tablet by mouth 2 times daily FIRST dose this evening; then tomorrow twice a day.  Qty: 7 tablet, Refills: 0    Associated Diagnoses: Community acquired pneumonia, unspecified laterality      predniSONE (DELTASONE) 20 MG tablet Take 2 tablets (40 mg) by mouth daily Dosed tomorrow 5/6/22  Qty: 1 tablet, Refills: 0    Associated Diagnoses: Chronic respiratory failure with hypoxia (H)         CONTINUE these medications which have NOT CHANGED    Details   acetaminophen (TYLENOL) 325 MG tablet Take 650 mg by mouth every 4 hours as needed for mild pain as needed for pain/fever Max dose 3000mg/24hr      allopurinol (ZYLOPRIM) 300 MG tablet Take 300 mg by mouth daily      ammonium lactate (LAC-HYDRIN) 12 % external lotion Apply topically daily Apply thin film to bilateral feet and legs       aspirin (ASA) 81 MG EC tablet Take 1 tablet (81 mg) by mouth daily  Qty:      Associated Diagnoses: Acute and chronic respiratory failure with hypoxia (H)      atorvastatin (LIPITOR) 10 MG tablet Take 10 mg by mouth daily      cyanocobalamin (VITAMIN B-12) 500 MCG SUBL sublingual tablet Place 1 tablet (500 mcg) under the tongue daily  Qty:      Associated Diagnoses: Vitamin B12 deficiency (non anemic)      Emollient (AMLACTIN ULTRA EX) Apply topically daily TO FEET      ferrous sulfate (FEROSUL) 325 (65 Fe) MG tablet Take 325 mg by mouth daily      furosemide (LASIX) 40 MG tablet Take 1 tablet (40 mg) by mouth daily    Associated Diagnoses: Acute diastolic congestive heart failure (H)      ipratropium-albuterol (COMBIVENT RESPIMAT)  MCG/ACT inhaler Inhale 1 puff into the lungs 4 times daily 0900, 1200 ,1600 ,2000      metoprolol tartrate (LOPRESSOR) 25 MG tablet Take 0.5 tablets (12.5 mg) by mouth 2 times daily    Associated Diagnoses: Cerebrovascular accident (CVA), unspecified mechanism (H)      nystatin (MYCOSTATIN) 336155 UNIT/GM external powder Apply topically 2 times daily Apply to red areas in anterior neck twice a day until rash resolves      pantoprazole (PROTONIX) 40 MG EC tablet Take 1 tablet (40 mg) by mouth 2 times daily (before meals)  Qty:      Associated Diagnoses: Acute and chronic respiratory failure with hypoxia (H)      !! PARoxetine (PAXIL) 10 MG tablet Take 10 mg by mouth every morning Take with 40mg tablet to equal 50mg total      !! PARoxetine (PAXIL) 40 MG tablet Take 40 mg by mouth every morning Take with 10mg tablet to equal 50mg total      polyethylene glycol (MIRALAX) 17 GM/Dose powder Take 17 g by mouth daily  Qty: 510 g    Associated Diagnoses: Constipation, unspecified constipation type      simethicone (MYLICON) 125 MG chewable tablet Take 125 mg by mouth 3 times daily as needed for intestinal gas      Skin Protectants, Misc. (CALAZIME SKIN PROTECTANT) PSTE Externally  apply topically 2 times daily Apply to buttocks topically for Excoriation to buttocks/unstageable Apply thin layer to buttocks  Also taking Apply to buttocks topically as needed for excoriation to butocks unstageable with incontinence episode       !! - Potential duplicate medications found. Please discuss with provider.      STOP taking these medications       ciprofloxacin (CIPRO) 250 MG tablet Comments:   Reason for Stopping:             Allergies   No Known Allergies  Data   Most Recent 3 CBC's:  Recent Labs   Lab Test 05/05/22  0904 05/04/22  0848 05/02/22  0850 05/01/22  1006   WBC 10.0  --  11.8* 14.0*   HGB 12.0*  --  13.2* 12.3*   *  --  102* 103*    190 195 196      Most Recent 3 BMP's:  Recent Labs   Lab Test 05/05/22  0904 05/02/22  0850 05/01/22  1006 11/09/21  0832   NA  --  137 136 138   POTASSIUM  --  4.1 4.4 4.0   CHLORIDE  --  101 95 103   CO2  --  30 35* 34*   BUN  --  33* 19 22   CR 1.13 1.15 1.21  1.21 0.84   ANIONGAP  --  6 6 1*   RAJ  --  9.6 9.1 8.8   GLC  --  187* 230* 131*     Most Recent 2 LFT's:  Recent Labs   Lab Test 05/01/22  1006 11/09/21  0832   AST 11 10   ALT 13 14   ALKPHOS 60 56   BILITOTAL 0.8 0.4

## 2022-05-05 NOTE — PROGRESS NOTES
Care Management Follow Up    Length of Stay (days): 4    Expected Discharge Date: 05/05/2022     Concerns to be Addressed:       Patient plan of care discussed at interdisciplinary rounds: Yes    Anticipated Discharge Disposition: Assisted Living, Home Care     Anticipated Discharge Services:    Anticipated Discharge DME:      Patient/family educated on Medicare website which has current facility and service quality ratings: no  Education Provided on the Discharge Plan:    Patient/Family in Agreement with the Plan: yes    Referrals Placed by CM/SW: External Care Coordination, Homecare, Transportation  Private pay costs discussed: Not applicable    Additional Information:  Informed by Hospitalist that patient is ready for discharge today.   Scheduled E stretcher transport for 1745 (1st available).  Spoke to Shireen at [facility] and she would like orders faxed ASAP and any new medications should be filled at hospital pharmacy and sent with patient.  Also contacted patient's daughter López to inform her of discharge plan.    PCS form completed and faxed to [facility].    Care Management Discharge Note    Discharge Date: 05/05/2022       Discharge Disposition: Assisted Living, Home Care    Discharge Services:      Discharge DME:      Discharge Transportation:      Private pay costs discussed: Not applicable    PAS Confirmation Code:    Patient/family educated on Medicare website which has current facility and service quality ratings: no    Education Provided on the Discharge Plan:    Persons Notified of Discharge Plans: Shireen ([facility]), daughter López, RAMANI, RN, Chg RN  Patient/Family in Agreement with the Plan: yes    Handoff Referral Completed: No    Additional Information:  [facility]ders fax[facility] ([phone]) and [facility] ([phone]).    Monserrat Aguilar RN, BSN, PHN  Inpatient Care Coordination  [facility]  Phone: [phone]

## 2022-05-05 NOTE — PLAN OF CARE
Pt here with COPD exacerbation, pneumonia, and UTI. A&O x4. Neuros with LUE hemiplegia and LLE hemiparesis (LUE strength 1/5, LLE 2/5). Foot drop at baseline R > L. VSS on 2L NC. Minced and moist diet, mildly thickened liquids. Takes pills whole in applesauce. Up with A2 lift. Cardona in place for retention. No BM on shift. Denies pain. Contact precautions for VRE maintained. Pt scoring green on the Aggression Stop Light Tool. Discharge likely back to [facility] when weaned off O2.

## 2022-05-06 ENCOUNTER — PATIENT OUTREACH (OUTPATIENT)
Dept: CARE COORDINATION | Facility: CLINIC | Age: 82
End: 2022-05-06
Payer: MEDICARE

## 2022-05-06 DIAGNOSIS — Z71.89 OTHER SPECIFIED COUNSELING: ICD-10-CM

## 2022-05-06 NOTE — PROGRESS NOTES
Clinic Care Coordination Contact    Background: Care Coordination referral placed from [facility] discharge report for reason of patient meeting criteria for a TCM outreach call by [facility] team.    Assessment: Upon chart review, [facility] Team member will cancel/close the referral for TCM outreach due to reason below:    Patient lives in a [facility] with full services.    Plan: Care Coordination referral for TCM outreach canceled.    Lexi Brown MA  [facility] [place], [facility]

## 2022-05-06 NOTE — PLAN OF CARE
Speech Language Therapy Discharge Summary    Reason for therapy discharge:    Discharged to [facility] with home care SLP.    Progress towards therapy goal(s). See goals on Care Plan in [facility] electronic health record for goal details.  Goals met    Therapy recommendation(s):    Continued therapy is recommended.  Rationale/Recommendations:  Patient will benefit from skilled SLP tx to ensure safe diet tolerance and to advance diet as able.  Diet at discharge: Easy to Chew solids and mildly thick liquids. Upright positioning, slow rate and small bites/sips.

## 2022-05-09 ENCOUNTER — TELEPHONE (OUTPATIENT)
Dept: PHARMACY | Facility: OTHER | Age: 82
End: 2022-05-09
Payer: MEDICARE

## 2022-05-09 NOTE — TELEPHONE ENCOUNTER
MTM referral from: Transitions of Care (recent hospital discharge or ED visit)    MT referral outreach attempt #2 on May 9, 2022 at 10:04 AM      Outcome: Patient not reachable after several attempts, will route to [facility] Pharmacist/Provider as an FYI.  [facility] scheduling number is [phone].  Thank you for the referral.    Amara Goodrich [facility]

## 2022-05-11 ENCOUNTER — DOCUMENTATION ONLY (OUTPATIENT)
Dept: OTHER | Facility: CLINIC | Age: 82
End: 2022-05-11
Payer: MEDICARE

## 2022-06-06 ENCOUNTER — HOSPITAL ENCOUNTER (EMERGENCY)
Facility: CLINIC | Age: 82
Discharge: HOME | End: 2022-06-06
Attending: EMERGENCY MEDICINE | Admitting: EMERGENCY MEDICINE
Payer: MEDICARE

## 2022-06-06 VITALS
RESPIRATION RATE: 20 BRPM | DIASTOLIC BLOOD PRESSURE: 66 MMHG | BODY MASS INDEX: 32.12 KG/M2 | SYSTOLIC BLOOD PRESSURE: 118 MMHG | HEIGHT: 72 IN | TEMPERATURE: 97.7 F | HEART RATE: 94 BPM | OXYGEN SATURATION: 91 %

## 2022-06-06 DIAGNOSIS — T83.9XXA FOLEY CATHETER PROBLEM, INITIAL ENCOUNTER (H): ICD-10-CM

## 2022-06-06 DIAGNOSIS — R33.9 URINARY RETENTION: ICD-10-CM

## 2022-06-06 PROCEDURE — 51702 INSERT TEMP BLADDER CATH: CPT

## 2022-06-06 PROCEDURE — 99283 EMERGENCY DEPT VISIT LOW MDM: CPT

## 2022-06-06 ASSESSMENT — ENCOUNTER SYMPTOMS: DIFFICULTY URINATING: 1

## 2022-06-06 NOTE — ED NOTES
Bed: ED11  Expected date:   Expected time:   Means of arrival:   Comments:  Saira - 81 M blood in cath eta 1341

## 2022-06-06 NOTE — ED TRIAGE NOTES
EMS report: from [facility]. attempted to flush chirinos catheter, met resistance, attempted to advance with further resistance  immediately started bleeding, EMS called to bring to ER for urology consult.

## 2022-06-06 NOTE — ED PROVIDER NOTES
History   Chief Complaint:  Catheter Problem     The history is provided by the patient.      Ron Gimlore is a 81 year old male with history of diabetes and BPH who presents with catheter problems. Per the patient, his catheter gets flushed 3 times a week but yesterday it was clogged and today it would not flush. The staff at his nursing home attempted to change his catheter, but they were not able to change and it began bleeding. He came into the ED for help changing his catheter.     Review of Systems   Genitourinary: Positive for difficulty urinating.   All other systems reviewed and are negative.    Allergies:  The patient has no known allergies.     Medications:  Allopurinol  Amoxicillin-Clavulanate  Aspirin 81 mg  Atorvastatin  Emollient  Ferrous Sulfate  Furosemide  Ipratropium-Albuterol  Metoprolol Tartrate  Nystatin  Pantoprazole  Paroxetine  Polyethylene Glycol  Prednisone    Past Medical History:     BPH  Cataract  Cholelithiasis  COPD  Depression  Diabetes Mellitus  Dyshidrotic Foot Dermatitis  Edema  Gout  Hyperlipidemia  Hypertension  Kidney Stones  Lumbago  Lumbar Disc Displacement without Myelopathy  Muscle Weakness  Neuropathy, Diabetic  Obesity  Spinal Stenosis  Stroke  Unsteady Gait  Urinary Retention with Incomplete Bladder Emptying  UTI  Vasovagal Episode    Past Surgical History:    Appendectomy  Shoulder Rotator Cuff Repair  Cataracts  Cholecystectomy  Bladder Stone Removal  Transurethral Resection  EP Loop Recorder Implant  Eye Surgery  IR IVC Filter Placement  IR Nephrostomy Tube Placement Right  IR Ureteral Stent Placement Right  Hip Replacement  Laminectomy Lumbar One Level  Laser Holmium Lithotripsy Ureter and Right Stent Insertion  Tonsillectomy    Family History:    Prostate Cancer - Father    Social History:  Patient arrived via ambulance.  Patient is unaccompanied.     Physical Exam     Patient Vitals for the past 24 hrs:   BP Temp Temp src Pulse Resp SpO2 Height   06/06/22 1351  135/88 97.7  F (36.5  C) Temporal 94 20 93 % 1.829 m (6')       Physical Exam   Eye:  Pupils are equal, round, and reactive.  Extraocular movements intact.    ENT:  No rhinorrhea.  Moist mucus membranes.  Normal tongue and tonsil.    Cardiac:  Regular rate and rhythm.  No murmurs, gallops, or rubs.    Pulmonary:  Clear to auscultation bilaterally.  No wheezes, rales, or rhonchi.    Abdomen:  Positive bowel sounds.  Abdomen is soft and non-distended, without focal tenderness.    Musculoskeletal:  Normal movement of all extremities without evidence for deficit.    Skin:  Warm and dry without rashes.    Neurologic:  Non-focal exam without asymmetric weakness or numbness.     Psychiatric:  Normal affect with appropriate interaction with examiner.      Emergency Department Course   Emergency Department Course:    Reviewed:  I reviewed nursing notes, vitals and past medical history    Assessments:  2566 I obtained history and examined the patient as noted above.   1422 I rechecked the patient and saw his catheter was successfully placed.     Disposition:  The patient was discharged to home.     Impression & Plan   Medical Decision Making:  This 81-year-old man with a known history of urinary retention presents to us with a catheter problem.   The catheter was not flushing appropriately today and therefore it was removed.  The nurse at the facility had a difficult time replacing this, likely blowing up the balloon while it was still in urethra and causing him discomfort with some bleeding.  However, by the time he arrived here, there was no bleeding at the urethral meatus.  My nurse was able to pass the catheter without difficulty and it is now draining clear urine.  I see no indication to send this off for urinalysis or to perform further testing at this point as he feels well with an appropriately draining catheter.  He will be transported back to his facility via EMS.  He was invited back to our facility at any point  for worsening of condition or other emergent concerns.    Diagnosis:    ICD-10-CM    1. Urinary retention  R33.9    2. Cardona catheter problem, initial encounter (H)  T83.9XXA      Scribe Disclosure:  I, González Gay [employer] Gloria Rizzo, am serving as a scribe at 1:57 PM on 6/6/2022 to document services personally performed by Trierweiler, Chad A, MD based on my observations and the provider's statements to me.        Trierweiler, Chad A, MD  06/06/22 1811

## 2022-06-16 NOTE — IR NOTE
Interventional Radiology Intra-procedural Nursing Note    Patient Name: Ron Gilmore  Medical Record Number: [id number]  Today's Date: May 24, 2021    Start Time: 1414  End of procedure time: 1423  Procedure: IVC Filter placement  Report given to: Mehdi Maloney RN  Time pt departs:  1434  : no    Other Notes: Pt came to IR suite 1 on a cart.  Pt was moved over to the table and was placed in supine position.  Pt was draped and prepped with chlorhexidine soap.  Pt was given fentanyl and versed for comfort.      MEDICATIONS: Last Dose 1410    Fentanyl 50 mcg  Versed 1 mg  Lidocaine 6 ml's      Candido Chou RN     Quality 110: Preventive Care And Screening: Influenza Immunization: Influenza Immunization Administered during Influenza season Detail Level: Detailed Quality 111:Pneumonia Vaccination Status For Older Adults: Pneumococcal vaccine administered on or after patient’s 60th birthday and before the end of the measurement period

## 2022-07-19 ENCOUNTER — DOCUMENTATION ONLY (OUTPATIENT)
Dept: CARDIOLOGY | Facility: CLINIC | Age: 82
End: 2022-07-19

## 2022-07-19 NOTE — PROGRESS NOTES
Patient missed device check 3/30/22. Missed appointment letter sent 3/31/22. 1 week missed appointment letter sent 4/6/22. 3 month missed appointment letter sent 7/5/22. No response. Patient has been inactivated in [employer] system. Will have to call device clinic to get back on schedule.     Cecilio Pierre,CVT  Device Clinic

## 2022-09-23 ENCOUNTER — HOSPITAL ENCOUNTER (INPATIENT)
Facility: CLINIC | Age: 82
LOS: 5 days | Discharge: NURSING, REHAB OR LONG-TERM CARE | DRG: 377 | End: 2022-09-28
Attending: EMERGENCY MEDICINE | Admitting: HOSPITALIST
Payer: MEDICARE

## 2022-09-23 DIAGNOSIS — J69.0 ASPIRATION PNEUMONIA OF BOTH LOWER LOBES DUE TO REGURGITATED FOOD (H): Primary | ICD-10-CM

## 2022-09-23 DIAGNOSIS — K92.2 GASTROINTESTINAL HEMORRHAGE, UNSPECIFIED GASTROINTESTINAL HEMORRHAGE TYPE: ICD-10-CM

## 2022-09-23 DIAGNOSIS — D62 ANEMIA DUE TO BLOOD LOSS, ACUTE: ICD-10-CM

## 2022-09-23 LAB
ABO/RH(D): NORMAL
ALBUMIN SERPL-MCNC: 2.6 G/DL (ref 3.4–5)
ALP SERPL-CCNC: 66 U/L (ref 40–150)
ALT SERPL W P-5'-P-CCNC: 16 U/L (ref 0–70)
ANION GAP SERPL CALCULATED.3IONS-SCNC: 9 MMOL/L (ref 3–14)
ANTIBODY SCREEN: NEGATIVE
AST SERPL W P-5'-P-CCNC: 11 U/L (ref 0–45)
BASOPHILS # BLD AUTO: 0 10E3/UL (ref 0–0.2)
BASOPHILS NFR BLD AUTO: 0 %
BILIRUB DIRECT SERPL-MCNC: <0.1 MG/DL (ref 0–0.2)
BILIRUB SERPL-MCNC: 0.5 MG/DL (ref 0.2–1.3)
BUN SERPL-MCNC: 14 MG/DL (ref 7–30)
CALCIUM SERPL-MCNC: 8.5 MG/DL (ref 8.5–10.1)
CHLORIDE BLD-SCNC: 100 MMOL/L (ref 94–109)
CO2 SERPL-SCNC: 30 MMOL/L (ref 20–32)
CREAT SERPL-MCNC: 0.74 MG/DL (ref 0.66–1.25)
EOSINOPHIL # BLD AUTO: 0.3 10E3/UL (ref 0–0.7)
EOSINOPHIL NFR BLD AUTO: 3 %
ERYTHROCYTE [DISTWIDTH] IN BLOOD BY AUTOMATED COUNT: 15.7 % (ref 10–15)
GFR SERPL CREATININE-BSD FRML MDRD: >90 ML/MIN/1.73M2
GLUCOSE BLD-MCNC: 169 MG/DL (ref 70–99)
GLUCOSE BLDC GLUCOMTR-MCNC: 123 MG/DL (ref 70–99)
HCT VFR BLD AUTO: 29.5 % (ref 40–53)
HEMOCCULT STL QL: POSITIVE
HGB BLD-MCNC: 7.9 G/DL (ref 13.3–17.7)
HGB BLD-MCNC: 8.1 G/DL (ref 13.3–17.7)
HOLD SPECIMEN: NORMAL
IMM GRANULOCYTES # BLD: 0.1 10E3/UL
IMM GRANULOCYTES NFR BLD: 1 %
INR PPP: 1.16 (ref 0.85–1.15)
LIPASE SERPL-CCNC: 82 U/L (ref 73–393)
LYMPHOCYTES # BLD AUTO: 1.2 10E3/UL (ref 0.8–5.3)
LYMPHOCYTES NFR BLD AUTO: 13 %
MCH RBC QN AUTO: 28.6 PG (ref 26.5–33)
MCHC RBC AUTO-ENTMCNC: 27.5 G/DL (ref 31.5–36.5)
MCV RBC AUTO: 104 FL (ref 78–100)
MONOCYTES # BLD AUTO: 0.7 10E3/UL (ref 0–1.3)
MONOCYTES NFR BLD AUTO: 7 %
NEUTROPHILS # BLD AUTO: 7.5 10E3/UL (ref 1.6–8.3)
NEUTROPHILS NFR BLD AUTO: 76 %
NRBC # BLD AUTO: 0 10E3/UL
NRBC BLD AUTO-RTO: 0 /100
PLATELET # BLD AUTO: 374 10E3/UL (ref 150–450)
POTASSIUM BLD-SCNC: 3.1 MMOL/L (ref 3.4–5.3)
PROT SERPL-MCNC: 7 G/DL (ref 6.8–8.8)
RBC # BLD AUTO: 2.83 10E6/UL (ref 4.4–5.9)
SARS-COV-2 RNA RESP QL NAA+PROBE: NEGATIVE
SODIUM SERPL-SCNC: 139 MMOL/L (ref 133–144)
SPECIMEN EXPIRATION DATE: NORMAL
WBC # BLD AUTO: 9.8 10E3/UL (ref 4–11)

## 2022-09-23 PROCEDURE — 86850 RBC ANTIBODY SCREEN: CPT | Performed by: EMERGENCY MEDICINE

## 2022-09-23 PROCEDURE — 250N000013 HC RX MED GY IP 250 OP 250 PS 637: Performed by: HOSPITALIST

## 2022-09-23 PROCEDURE — 82248 BILIRUBIN DIRECT: CPT | Performed by: EMERGENCY MEDICINE

## 2022-09-23 PROCEDURE — C9113 INJ PANTOPRAZOLE SODIUM, VIA: HCPCS | Performed by: EMERGENCY MEDICINE

## 2022-09-23 PROCEDURE — 99223 1ST HOSP IP/OBS HIGH 75: CPT | Mod: AI | Performed by: HOSPITALIST

## 2022-09-23 PROCEDURE — 36415 COLL VENOUS BLD VENIPUNCTURE: CPT | Performed by: HOSPITALIST

## 2022-09-23 PROCEDURE — U0003 INFECTIOUS AGENT DETECTION BY NUCLEIC ACID (DNA OR RNA); SEVERE ACUTE RESPIRATORY SYNDROME CORONAVIRUS 2 (SARS-COV-2) (CORONAVIRUS DISEASE [COVID-19]), AMPLIFIED PROBE TECHNIQUE, MAKING USE OF HIGH THROUGHPUT TECHNOLOGIES AS DESCRIBED BY CMS-2020-01-R: HCPCS | Performed by: EMERGENCY MEDICINE

## 2022-09-23 PROCEDURE — 80053 COMPREHEN METABOLIC PANEL: CPT | Performed by: EMERGENCY MEDICINE

## 2022-09-23 PROCEDURE — 250N000011 HC RX IP 250 OP 636: Performed by: EMERGENCY MEDICINE

## 2022-09-23 PROCEDURE — 85610 PROTHROMBIN TIME: CPT | Performed by: EMERGENCY MEDICINE

## 2022-09-23 PROCEDURE — 36415 COLL VENOUS BLD VENIPUNCTURE: CPT | Performed by: EMERGENCY MEDICINE

## 2022-09-23 PROCEDURE — 82272 OCCULT BLD FECES 1-3 TESTS: CPT | Performed by: EMERGENCY MEDICINE

## 2022-09-23 PROCEDURE — 85025 COMPLETE CBC W/AUTO DIFF WBC: CPT | Performed by: EMERGENCY MEDICINE

## 2022-09-23 PROCEDURE — 120N000001 HC R&B MED SURG/OB

## 2022-09-23 PROCEDURE — 96374 THER/PROPH/DIAG INJ IV PUSH: CPT

## 2022-09-23 PROCEDURE — 85018 HEMOGLOBIN: CPT | Performed by: HOSPITALIST

## 2022-09-23 PROCEDURE — C9803 HOPD COVID-19 SPEC COLLECT: HCPCS

## 2022-09-23 PROCEDURE — 83690 ASSAY OF LIPASE: CPT | Performed by: EMERGENCY MEDICINE

## 2022-09-23 PROCEDURE — 99285 EMERGENCY DEPT VISIT HI MDM: CPT | Mod: 25

## 2022-09-23 PROCEDURE — 86901 BLOOD TYPING SEROLOGIC RH(D): CPT | Performed by: EMERGENCY MEDICINE

## 2022-09-23 RX ORDER — METHENAMINE HIPPURATE 1000 MG/1
1 TABLET ORAL 2 TIMES DAILY
Status: DISCONTINUED | OUTPATIENT
Start: 2022-09-23 | End: 2022-09-28 | Stop reason: HOSPADM

## 2022-09-23 RX ORDER — POTASSIUM CHLORIDE 20MEQ/15ML
40 LIQUID (ML) ORAL ONCE
Status: DISCONTINUED | OUTPATIENT
Start: 2022-09-23 | End: 2022-09-28 | Stop reason: HOSPADM

## 2022-09-23 RX ORDER — LIDOCAINE 40 MG/G
CREAM TOPICAL
Status: DISCONTINUED | OUTPATIENT
Start: 2022-09-23 | End: 2022-09-28 | Stop reason: HOSPADM

## 2022-09-23 RX ORDER — PAROXETINE 10 MG/1
10 TABLET, FILM COATED ORAL EVERY MORNING
Status: DISCONTINUED | OUTPATIENT
Start: 2022-09-24 | End: 2022-09-28 | Stop reason: HOSPADM

## 2022-09-23 RX ORDER — SUCRALFATE 1 G/1
1 TABLET ORAL 3 TIMES DAILY
Status: DISCONTINUED | OUTPATIENT
Start: 2022-09-23 | End: 2022-09-26

## 2022-09-23 RX ORDER — CARBOXYMETHYLCELLULOSE SODIUM 5 MG/ML
2 SOLUTION/ DROPS OPHTHALMIC 2 TIMES DAILY
Status: DISCONTINUED | OUTPATIENT
Start: 2022-09-23 | End: 2022-09-28 | Stop reason: HOSPADM

## 2022-09-23 RX ORDER — ONDANSETRON 2 MG/ML
4 INJECTION INTRAMUSCULAR; INTRAVENOUS EVERY 6 HOURS PRN
Status: DISCONTINUED | OUTPATIENT
Start: 2022-09-23 | End: 2022-09-28 | Stop reason: HOSPADM

## 2022-09-23 RX ORDER — SUCRALFATE 1 G/1
1 TABLET ORAL 3 TIMES DAILY
Status: ON HOLD | COMMUNITY
End: 2022-09-27

## 2022-09-23 RX ORDER — PAROXETINE 40 MG/1
40 TABLET, FILM COATED ORAL EVERY MORNING
Status: DISCONTINUED | OUTPATIENT
Start: 2022-09-24 | End: 2022-09-28 | Stop reason: HOSPADM

## 2022-09-23 RX ORDER — IPRATROPIUM BROMIDE AND ALBUTEROL SULFATE 2.5; .5 MG/3ML; MG/3ML
1 SOLUTION RESPIRATORY (INHALATION) 2 TIMES DAILY
Status: DISCONTINUED | OUTPATIENT
Start: 2022-09-23 | End: 2022-09-28 | Stop reason: HOSPADM

## 2022-09-23 RX ORDER — SIMETHICONE 125 MG
125 TABLET,CHEWABLE ORAL 3 TIMES DAILY PRN
Status: DISCONTINUED | OUTPATIENT
Start: 2022-09-23 | End: 2022-09-28 | Stop reason: HOSPADM

## 2022-09-23 RX ORDER — ALLOPURINOL 300 MG/1
300 TABLET ORAL DAILY
Status: DISCONTINUED | OUTPATIENT
Start: 2022-09-24 | End: 2022-09-24

## 2022-09-23 RX ORDER — IPRATROPIUM BROMIDE AND ALBUTEROL SULFATE 2.5; .5 MG/3ML; MG/3ML
1 SOLUTION RESPIRATORY (INHALATION) 2 TIMES DAILY
COMMUNITY
End: 2023-04-03

## 2022-09-23 RX ORDER — METHENAMINE HIPPURATE 1000 MG/1
1 TABLET ORAL 2 TIMES DAILY
COMMUNITY

## 2022-09-23 RX ORDER — ONDANSETRON 4 MG/1
4 TABLET, ORALLY DISINTEGRATING ORAL EVERY 6 HOURS PRN
Status: DISCONTINUED | OUTPATIENT
Start: 2022-09-23 | End: 2022-09-28 | Stop reason: HOSPADM

## 2022-09-23 RX ADMIN — PANTOPRAZOLE SODIUM 40 MG: 40 INJECTION, POWDER, FOR SOLUTION INTRAVENOUS at 15:47

## 2022-09-23 RX ADMIN — METHENAMINE HIPPURATE 1 G: 1 TABLET ORAL at 21:43

## 2022-09-23 RX ADMIN — SUCRALFATE 1 G: 1 TABLET ORAL at 21:43

## 2022-09-23 RX ADMIN — Medication 2 DROP: at 21:43

## 2022-09-23 RX ADMIN — METOPROLOL TARTRATE 12.5 MG: 25 TABLET, FILM COATED ORAL at 21:43

## 2022-09-23 RX ADMIN — UMECLIDINIUM BROMIDE AND VILANTEROL TRIFENATATE 1 PUFF: 62.5; 25 POWDER RESPIRATORY (INHALATION) at 21:43

## 2022-09-23 ASSESSMENT — ACTIVITIES OF DAILY LIVING (ADL)
ADLS_ACUITY_SCORE: 35
ADLS_ACUITY_SCORE: 36
ADLS_ACUITY_SCORE: 36
ADLS_ACUITY_SCORE: 35

## 2022-09-23 NOTE — ED NOTES
[facility]  ED Nurse Handoff Report    ED Chief complaint: GI Bleeding and Melena      ED Diagnosis:   Final diagnoses:   Anemia due to blood loss, acute   Gastrointestinal hemorrhage, unspecified gastrointestinal hemorrhage type       Code Status: DNR / DNI    Allergies: No Known Allergies    Patient Story: Patient arrived via EMS from assisted living with complaints of abdominal. Discomfort and losse, black tarry stools that began about two weeks ago to the best of the patient's knowledge.      Patient is alert and oriented x 4, calm and cooperative. VS are stable, skin is pale color, cool and dry, respirations are even and non-labored on room 2 L O2 via NC, 89% on room air on arrival. Patient is on 1 L supplemental O2 at home.     Patient denied chest pain, shortness of breath, dizziness, and nausea. Pulses are strong and regular with palpation, cap refill > 3 seconds.   Focused Assessment:  Patient is alert and oriented x 4, calm and cooperative. VS are stable, skin is warm and dry, respirations are even and non-labored on room air. Patient denied chest pain, shortness of breath, dizziness, and nausea. Patient has left-sided hemiparesis from a previous stroke in 2020, requiring assist of 2 for bed mobility, he is aleks transfer at his facility, has an existing indwelling chirinos catheter, is incontinent of stool, and has existing pressure related tissue injury on bilateral buttocks, over which Mepilex dressing were applied after pad change and barrier cream application.        Treatments and/or interventions provided:   Medications   pantoprazole (PROTONIX) IV push injection 40 mg (40 mg Intravenous Given 9/23/22 2737)       Patient's response to treatments and/or interventions: Stable.    To be done/followed up on inpatient unit:  Monitor    Does this patient have any cognitive concerns?: None    Activity level - Baseline/Home:  Total Care  Activity Level - Current:   Total Care    Patient's  Preferred language: English   Needed?: No    Isolation: None and Contact   Infection: Not Applicable  VRE  Patient tested for COVID 19 prior to admission: YES  Bariatric?: No    Vital Signs:   Vitals:    09/23/22 1515 09/23/22 1520 09/23/22 1540 09/23/22 1541   BP:       Pulse:       Resp:   (P) 16 16   Temp:    98.3  F (36.8  C)   TempSrc:    Oral   SpO2: 98% 99%         Cardiac Rhythm:     Was the PSS-3 completed:   Yes  What interventions are required if any?               Family Comments: No family present at this time.   OBS brochure/video discussed/provided to patient/family: N/A              Name of person given brochure if not patient: NA              Relationship to patient: NA    For the majority of the shift this patient's behavior was Green.   Behavioral interventions performed were  information and reassurance provided.  .    ED NURSE PHONE NUMBER: [phone]

## 2022-09-23 NOTE — ED PROVIDER NOTES
"  History   Chief Complaint:  Melena and anemia     The history is provided by the patient and medical records.      Ron Gilmore is a 82 year old male with history of \COPD, morbid obesity, type 2 diabetes mellitus, sepsis, and GI hemorrhage who presents via EMS from [facility] with complaints of black, tarry stools for multipel days.  Presently, he describes his abdominal pain as feeling like his \"stomach is stirred up\" and notes that he has been having decreased PO intake. He denies any recent emesis. Of note, he had blood work done 4 months ago, in May, which showed his hemoglobin to be 12. He had another hemoglobin test done yesterday which came back at 8.2 per EMS. The patient does have a history of GI related issues and was admitted to the hospital last year. At that time, he had a colonoscopy done by Dr. Davis, which showed diverticula and polyps. He also had an EGD, which came back normal. He also has a history of an indwelling chirinos as well as an appendectomy. The patient is currently prescribed baby aspirin, lasix, metoprolol, pantoprazole, and sucralfate daily.     The patient also has a DNR/DNI and lists decision influencer as his step-daughter, López Amin. Her phone number is [phone].    Review of Systems   All other systems reviewed and are negative.    Allergies:  The patient has no known allergies.     Medications:  Zyloprim  Lipitor  Amlactin  Lasix  Combvient respimat inhaler  Lopressor  Mycostatin  Protonix  Paxil  Prednisone  Mylicon    Past Medical History:     SIRS  CAP  Depression  COPD  CVA  Morbid obesity  Type 2 diabetes mellitus  Metabolic encephalopathy  UTI  Severe sepsis  Lactic acidosis  Obstructive right sided ureteral stone resulting in hydronephrosis  Chronic respiratory failure w/ hypoxia  NICOLE  Hemiparesis  DVT  GI hemorrhage     Past Surgical History:    Appendectomy  Shoulder arthroscopy, rotator cuff repair  Cataracts   Cholecystectomy  Colonoscopy " x2  Cystoscopy, TUR prostate combined  Cystoscopy  EP loop recorder implant  EGD  Right eyelid surgery  IR IVC filter placement  IR nephrostomy tube placement, right  IR ureteral stent placement, right  Hip replacement, right  Bilateral knee surgery  Laminectomy lumbar one level  Tonsillectomy     Family History:    Prostate cancer    Social History:  The patient presents to the ED via EMS.  The patient lives in an assisted living facility.  PCP: Kalen Metcalf     Physical Exam     Patient Vitals for the past 24 hrs:   BP Temp Temp src Pulse Resp SpO2   09/23/22 1541 -- 98.3  F (36.8  C) Oral -- 16 --   09/23/22 1540 -- -- -- -- (P) 16 --   09/23/22 1520 -- -- -- -- -- 99 %   09/23/22 1515 -- -- -- -- -- 98 %   09/23/22 1510 129/67 -- -- 85 -- 91 %   09/23/22 1508 -- -- -- -- -- (!) 89 %     Physical Exam  General: Chronically ill-appearing male sitting upright in room 29  HENT: mucous membranes moist, beard  CV: rate as above, regular rhythm  Resp: normal effort, speaks in full phrases, no stridor, no cough observed  GI: abdomen soft and nontender, protuberant, no guarding  Rectal: Moderate amount of loose very dark stool in diaper, indwelling Cardona catheter present draining concentrated but nonbloody appearing urine  MSK: no bony tenderness  Skin: appropriately warm and dry, abdominal scar  Neuro: alert, clear speech, oriented, left-sided extremity weakness (pre-existing)  Psych: cooperative, pleasant    Emergency Department Course     Laboratory:  Labs Ordered and Resulted from Time of ED Arrival to Time of ED Departure   INR - Abnormal       Result Value    INR 1.16 (*)    BASIC METABOLIC PANEL - Abnormal    Sodium 139      Potassium 3.1 (*)     Chloride 100      Carbon Dioxide (CO2) 30      Anion Gap 9      Urea Nitrogen 14      Creatinine 0.74      Calcium 8.5      Glucose 169 (*)     GFR Estimate >90     CBC WITH PLATELETS AND DIFFERENTIAL - Abnormal    WBC Count 9.8      RBC Count 2.83 (*)     Hemoglobin  8.1 (*)     Hematocrit 29.5 (*)      (*)     MCH 28.6      MCHC 27.5 (*)     RDW 15.7 (*)     Platelet Count 374      % Neutrophils 76      % Lymphocytes 13      % Monocytes 7      % Eosinophils 3      % Basophils 0      % Immature Granulocytes 1      NRBCs per 100 WBC 0      Absolute Neutrophils 7.5      Absolute Lymphocytes 1.2      Absolute Monocytes 0.7      Absolute Eosinophils 0.3      Absolute Basophils 0.0      Absolute Immature Granulocytes 0.1      Absolute NRBCs 0.0     HEPATIC FUNCTION PANEL - Abnormal    Bilirubin Total 0.5      Bilirubin Direct <0.1      Protein Total 7.0      Albumin 2.6 (*)     Alkaline Phosphatase 66      AST 11      ALT 16     OCCULT BLOOD STOOL - Abnormal    Occult Blood Positive (*)    LIPASE - Normal    Lipase 82     COVID-19 VIRUS (CORONAVIRUS) BY PCR - Normal    SARS CoV2 PCR Negative     TYPE AND SCREEN, ADULT    ABO/RH(D) B NEG      Antibody Screen Negative      SPECIMEN EXPIRATION DATE [id number]     ABO/RH TYPE AND SCREEN      Emergency Department Course:     Reviewed:  I reviewed nursing notes, vitals, past medical history and Care Everywhere    Assessments:  1515 I obtained history and examined the patient as noted above.     Consults:  1635 I consulted with Dr. Marcos, hospitalist, regarding the patient's history and presentation here in the emergency department who accepted the patient for admission.    Interventions:  1547 Protonix 40 mg IV    Disposition:  The patient was admitted to the hospital under the care of Dr. Marcos.     Impression & Plan   Medical Decision Making:  He presents with anemia and is found to have a GI bleed.  Vitals are satisfactory and his abdomen is without peritonitis, so I do not think he needs emergent abdominal imaging, though I do think he would benefit from upper endoscopy and for this reason he was hospitalized under the care of the hospitalist service.  He is anemic but I do not think he requires immediate transfusion, but  this can be reconsidered pending his clinical course and serial laboratory studies.  Patient confirms that he is DNR/DNI but would wish to consider endoscopy if clinically indicated.    Diagnosis:    ICD-10-CM    1. Anemia due to blood loss, acute  D62    2. Gastrointestinal hemorrhage, unspecified gastrointestinal hemorrhage type  K92.2      Scribe Disclosure:  LEONIDAS, Preethi Albarran, am serving as a scribe at 3:11 PM on 9/23/2022 to document services personally performed by Zane Denton MD based on my observations and the provider's statements to me.            Zane Denton MD  09/23/22 8914

## 2022-09-23 NOTE — PHARMACY-ADMISSION MEDICATION HISTORY
Pharmacy Medication History  Admission medication history interview status for the 9/23/2022  admission is complete. See EPIC admission navigator for prior to admission medications     Location of Interview: Outside patient room but on unit  Medication history sources: [facility] ([facility])    Significant changes made to the medication list:  Removed: Augmentin, nystatin, prednisone  Updated: Emollient to PRN, ferrous sulfate to BID  Added: Methenamine, Duo Nebs, sucralfate, artificial tears    In the past week, patient estimated taking medication this percent of the time: greater than 90%    Additional medication history information:   None    Medication reconciliation completed by provider prior to medication history? No    Time spent in this activity: 30 minutes    Prior to Admission medications    Medication Sig Last Dose Taking? Auth Provider Long Term End Date   acetaminophen (TYLENOL) 325 MG tablet Take 650 mg by mouth every 4 hours as needed for mild pain as needed for pain/fever Max dose 3000mg/24hr  at prn Yes Reported, Patient     allopurinol (ZYLOPRIM) 300 MG tablet Take 300 mg by mouth daily 9/23/2022 at am Yes Unknown, Entered By History     ammonium lactate (LAC-HYDRIN) 12 % external lotion Apply topically daily Apply thin film to bilateral feet and legs 9/23/2022 at am Yes Unknown, Entered By History     aspirin (ASA) 81 MG EC tablet Take 1 tablet (81 mg) by mouth daily 9/23/2022 at am Yes Rg Tobin DO     atorvastatin (LIPITOR) 10 MG tablet Take 10 mg by mouth daily 9/23/2022 at am Yes Unknown, Entered By History No    cyanocobalamin (VITAMIN B-12) 500 MCG SUBL sublingual tablet Place 1 tablet (500 mcg) under the tongue daily 9/23/2022 at am Yes Barron Duque MD     Emollient (AMLACTIN ULTRA EX) Apply topically as needed TO FEET  at prn Yes Reported, Patient     ferrous sulfate (FEROSUL) 325 (65 Fe) MG tablet Take 325 mg by mouth 2 times daily 9/23/2022 at am Yes  Reported, Patient     furosemide (LASIX) 40 MG tablet Take 1 tablet (40 mg) by mouth daily 9/23/2022 at am Yes Barron Duque MD Yes    hypromellose (ARTIFICIAL TEARS) 0.5 % SOLN ophthalmic solution 2 drops 2 times daily Into the affected eye (eye irritation) in addition to 2 drops twice daily PRN 9/23/2022 at am Yes Unknown, Entered By History     ipratropium - albuterol 0.5 mg/2.5 mg/3 mL (DUONEB) 0.5-2.5 (3) MG/3ML neb solution Take 1 vial by nebulization 2 times daily In addition to 1 vial twice a day PRN 9/23/2022 at am Yes Unknown, Entered By History No    ipratropium-albuterol (COMBIVENT RESPIMAT)  MCG/ACT inhaler Inhale 1 puff into the lungs 4 times daily 0900, 1200 ,1600 ,2000 9/23/2022 at am Yes Reported, Patient No    methenamine hippurate (HIPREX) 1 g tablet Take 1 g by mouth 2 times daily 9/23/2022 at am Yes Unknown, Entered By History     metoprolol tartrate (LOPRESSOR) 25 MG tablet Take 0.5 tablets (12.5 mg) by mouth 2 times daily 9/23/2022 at am Yes Romulo Cavanaugh MD Yes    pantoprazole (PROTONIX) 40 MG EC tablet Take 1 tablet (40 mg) by mouth 2 times daily (before meals) 9/23/2022 at am Yes Rg Tobin,      PARoxetine (PAXIL) 10 MG tablet Take 10 mg by mouth every morning Take with 40mg tablet to equal 50mg total 9/23/2022 at am Yes Unknown, Entered By History No    PARoxetine (PAXIL) 40 MG tablet Take 40 mg by mouth every morning Take with 10mg tablet to equal 50mg total 9/23/2022 at am Yes Unknown, Entered By History No    polyethylene glycol (MIRALAX) 17 GM/Dose powder Take 17 g by mouth daily  Patient taking differently: Take 17 g by mouth daily as needed  at prn Yes Romulo Cavanaugh MD     simethicone (MYLICON) 125 MG chewable tablet Take 125 mg by mouth 3 times daily as needed for intestinal gas  at prn Yes Unknown, Entered By History     Skin Protectants, Misc. (CALAZIME SKIN PROTECTANT) PSTE Externally apply topically 2 times daily Apply to buttocks  topically for Excoriation to buttocks/unstageable Apply thin layer to buttocks  Also taking Apply to buttocks topically as needed for excoriation to butocks unstageable with incontinence episode 9/23/2022 at am Yes Reported, Patient     sucralfate (CARAFATE) 1 GM tablet Take 1 g by mouth 3 times daily For 4 weeks (ending 10/7/22 9/23/2022 at am Yes Unknown, Entered By History         The information provided in this note is only as accurate as the sources available at the time of update(s)

## 2022-09-23 NOTE — ED NOTES
Bed: ED29  Expected date:   Expected time:   Means of arrival:   Comments:  E3  82 M poss GI bleed  [id number]

## 2022-09-23 NOTE — ED NOTES
Joi CONTRERAS from facility called for update, advised that patient will likely stay overnight in the hospital for further workup and treatment pending MD. [phone]

## 2022-09-23 NOTE — H&P
"[facility]    History and Physical - Hospitalist Service       Date of Admission:  9/23/2022    Assessment & Plan      Ron Gilmore is a 82 year old male with history of morbid obesity, type 2 diabetes, COPD, iron deficiency anemia, chronic infection 2 L, among others who presented to the ED with complaints of abdominal discomfort and black stool.  Patient came via EMS from [facility].  Patient has had these symptoms intermittently for years but more recently have increased and his stomach feels \"stirred up\".  He reports black stool as well.  He has not had any vomiting but has had some nausea with the discomfort at times.  He does have a history of similar symptoms last year and was seen by Dr. Davis where EGD was fairly unremarkable and colon showed diverticula and polyps.  Otherwise, patient denies recent illness-no fevers, chills, cough, shortness of breath, chest pain, dysuria, or lower extremity edema.  He has been taking his medications which do include aspirin, pantoprazole, and sucralfate among others.    Melena  Anemia, unclear chronicity, likely in part due to GI blood loss  History of iron deficiency anemia, per chart review  History of GI bleed  History of diverticulosis and colon polyps  Patient with worsening abdominal discomfort and melena which previously had been intermittent over the last few years.  Of note his hemoglobin in May was around 12 and today 8.1.  He denies dizziness or lightheadedness.  No vomiting.  He has been taking his Protonix and sucralfate.  He is on aspirin.  He did see Dr. Davis last year with unremarkable EGD and colon showing diverticula and polyps.  - Admit to inpatient  - Type and screen and consent for blood transfusion (if needed) he has been done in the ER  - Transfuse for less than 7 or active bleeding  - Follow hemoglobin later tonight and again tomorrow morning  - N.p.o. at midnight for potential of endoscopy in the morning  - GI " consult-Dr. Davis  - continue PPI BID    Hypokalemia  Potassium 3.1 upon admission.  Likely due to decreased oral intake while feeling unwell as above.  - Replace as able  - Follow BMP    Chronic hypoxic respiratory failure-on 2 L nasal cannula at home  Stable upon admission.  Continue supplemental oxygen.    Morbid obesity  Lifestyle modifications as able with PCP follow-up.    Chronic indwelling chirinos  In place.  Denies blood in urine. No new issues, states he has used chirinos for a long time. Continue.       Diet: NPO for Medical/Clinical Reasons Except for: No Exceptions    DVT Prophylaxis: Pneumatic Compression Devices  Chirinos Catheter: Not present  Central Lines: None  Cardiac Monitoring: None  Code Status:   DNR DNI - confirmed with patient    Clinically Significant Risk Factors Present on Admission             # Hypoalbuminemia: Albumin = 2.6 g/dL (Ref range: 3.4 - 5.0 g/dL) on admission, will monitor as appropriate   # Coagulation Defect: INR = 1.16 (Ref range: 0.85 - 1.15) and/or PTT = N/A on admission, will monitor for bleeding            Disposition Plan      Expected Discharge Date: 09/25/2022                The patient's care was discussed with the Patient and ED MD.    Kiet Marcos MD  Hospitalist Service  [facility]  Securely message with the [employer] Web Console (learn more here)  Text page via [employer] Paging/Directory         ______________________________________________________________________    Chief Complaint   Abdominal pain and black stool    History is obtained from the patient    History of Present Illness   Ron Gilmore is a 82 year old male with history of morbid obesity, type 2 diabetes, COPD, iron deficiency anemia, chronic infection 2 L, among others who presented to the ED with complaints of abdominal discomfort and black stool.  Patient came via EMS from [facility].  Patient has had these symptoms intermittently for years but more recently have  "increased and his stomach feels \"stirred up\".  He reports black stool as well.  He has not had any vomiting but has had some nausea with the discomfort at times.  He does have a history of similar symptoms last year and was seen by Dr. Davis where EGD was fairly unremarkable and colon showed diverticula and polyps.  Otherwise, patient denies recent illness-no fevers, chills, cough, shortness of breath, chest pain, dysuria, or lower extremity edema.  He has been taking his medications which do include aspirin, pantoprazole, and sucralfate among others.    Review of Systems    The 10 point Review of Systems is negative other than noted in the HPI or here.     Past Medical History    I have reviewed this patient's medical history and updated it with pertinent information if needed.   Past Medical History:   Diagnosis Date     BPH (benign prostatic hyperplasia)      Cataract      Cholelithiasis      COPD (chronic obstructive pulmonary disease) (H)      Depression      Diabetes mellitus     Type 2     Dyshidrotic foot dermatitis      Edema      Gout      Hyperlipidemia      Hypertension      Infection due to 2019 novel coronavirus 5/1/2021     Kidney stones     Bladder Stones     Lumbago      Lumbar disc displacement without myelopathy      Muscle weakness      Neuropathy, diabetic (H)      Obesity      Spinal stenosis      Stroke (H)     with residual effects- weakness LUE> LLE     Unsteady gait      Urinary retention with incomplete bladder emptying      UTI (urinary tract infection)      Vasovagal episode        Past Surgical History   I have reviewed this patient's surgical history and updated it with pertinent information if needed.  Past Surgical History:   Procedure Laterality Date     APPENDECTOMY OPEN       ARTHROSCOPY SHOULDER ROTATOR CUFF REPAIR       cataracts Bilateral      CHOLECYSTECTOMY       COLONOSCOPY  1986     COLONOSCOPY N/A 5/29/2021    Procedure: COLONOSCOPY;  Surgeon: Kofi Davis MD;  " Location:  GI     CYSTOSCOPY  10/19/2011    Procedure:CYSTOSCOPY; CYSTOSCOPY, BLADDER STONE REMOVAL; Surgeon:ROB SAWYER; Location: OR     CYSTOSCOPY, TRANSURETHRAL RESECTION (TUR) PROSTATE, COMBINED N/A 2/21/2018    Procedure: COMBINED CYSTOSCOPY, TRANSURETHRAL RESECTION (TUR) PROSTATE;  COMBINED CYSTOSCOPY, TRANSURETHRAL RESECTION (TUR) PROSTATE ;  Surgeon: Rob Sawyer MD;  Location:  OR     EP LOOP RECORDER IMPLANT N/A 1/20/2020    Procedure: EP Loop Recorder Implant;  Surgeon: Evgeny Parisi MD;  Location:  HEART CARD[facility] CATH LAB     ESOPHAGOSCOPY, GASTROSCOPY, DUODENOSCOPY (EGD), COMBINED N/A 5/28/2021    Procedure: ESOPHAGOGASTRODUODENOSCOPY (EGD);  Surgeon: Aurora Waterman MD;  Location:  GI     EYE SURGERY      right lid surgery      IR IVC FILTER PLACEMENT  5/24/2021     IR NEPHROSTOMY TUBE PLACEMENT RIGHT  3/9/2021     IR URETERAL STENT PLACEMENT RIGHT  3/16/2021     JOINT REPLACEMENT Right     HIP     KNEE SURGERY Bilateral      LAMINECTOMY LUMBAR ONE LEVEL       LASER HOLMIUM LITHOTRIPSY URETER(S), INSERT STENT, COMBINED Right 4/14/2021    Procedure: CYSTOSCOPY, BLADDER STONE REMOVAL, RIGHT URETEROSCOPY, HOLMIUM LASER LITHOTRIPSY, AND RIGHT STENT REMOVAL, RIGHT RETROGRADE;  Surgeon: Rob Sawyer MD;  Location:  OR     TONSILLECTOMY         Social History   I have reviewed this patient's social history and updated it with pertinent information if needed.  Social History     Tobacco Use     Smoking status: Former Smoker     Smokeless tobacco: Never Used   Substance Use Topics     Alcohol use: No     Comment: none for 29 yrs     Drug use: No       Family History   I have reviewed this patient's family history and updated it with pertinent information if needed.  Family History   Problem Relation Age of Onset     Prostate Cancer Father        Prior to Admission Medications   Prior to Admission Medications   Prescriptions Last Dose Informant Patient Reported? Taking?    Emollient (AMLACTIN ULTRA EX)  Nursing Home Yes No   Sig: Apply topically daily TO FEET   PARoxetine (PAXIL) 10 MG tablet   Yes No   Sig: Take 10 mg by mouth every morning Take with 40mg tablet to equal 50mg total   PARoxetine (PAXIL) 40 MG tablet   Yes No   Sig: Take 40 mg by mouth every morning Take with 10mg tablet to equal 50mg total   Skin Protectants, Misc. (CALAZIME SKIN PROTECTANT) PSTE  Nursing Home Yes No   Sig: Externally apply topically 2 times daily Apply to buttocks topically for Excoriation to buttocks/unstageable Apply thin layer to buttocks  Also taking Apply to buttocks topically as needed for excoriation to butocks unstageable with incontinence episode   acetaminophen (TYLENOL) 325 MG tablet  Nursing Home Yes No   Sig: Take 650 mg by mouth every 4 hours as needed for mild pain as needed for pain/fever Max dose 3000mg/24hr   allopurinol (ZYLOPRIM) 300 MG tablet  Nursing Home Yes No   Sig: Take 300 mg by mouth daily   ammonium lactate (LAC-HYDRIN) 12 % external lotion   Yes No   Sig: Apply topically daily Apply thin film to bilateral feet and legs   amoxicillin-clavulanate (AUGMENTIN) 875-125 MG tablet   No No   Sig: Take 1 tablet by mouth 2 times daily FIRST dose this evening; then tomorrow twice a day.   aspirin (ASA) 81 MG EC tablet  Nursing Home No No   Sig: Take 1 tablet (81 mg) by mouth daily   atorvastatin (LIPITOR) 10 MG tablet  Nursing Home Yes No   Sig: Take 10 mg by mouth daily   cyanocobalamin (VITAMIN B-12) 500 MCG SUBL sublingual tablet  Nursing Home No No   Sig: Place 1 tablet (500 mcg) under the tongue daily   ferrous sulfate (FEROSUL) 325 (65 Fe) MG tablet  Nursing Home Yes No   Sig: Take 325 mg by mouth daily   furosemide (LASIX) 40 MG tablet  Nursing Home No No   Sig: Take 1 tablet (40 mg) by mouth daily   ipratropium-albuterol (COMBIVENT RESPIMAT)  MCG/ACT inhaler  Nursing Home Yes No   Sig: Inhale 1 puff into the lungs 4 times daily 0900, 1200 ,1600 ,2000   metoprolol  tartrate (LOPRESSOR) 25 MG tablet  Nursing Home No No   Sig: Take 0.5 tablets (12.5 mg) by mouth 2 times daily   nystatin (MYCOSTATIN) 041214 UNIT/GM external powder   Yes No   Sig: Apply topically 2 times daily Apply to red areas in anterior neck twice a day until rash resolves   pantoprazole (PROTONIX) 40 MG EC tablet  Nursing Home No No   Sig: Take 1 tablet (40 mg) by mouth 2 times daily (before meals)   polyethylene glycol (MIRALAX) 17 GM/Dose powder  Nursing Home No No   Sig: Take 17 g by mouth daily   Patient taking differently: Take 17 g by mouth daily as needed   predniSONE (DELTASONE) 20 MG tablet   No No   Sig: Take 2 tablets (40 mg) by mouth daily Dosed tomorrow 5/6/22   simethicone (MYLICON) 125 MG chewable tablet   Yes No   Sig: Take 125 mg by mouth 3 times daily as needed for intestinal gas      Facility-Administered Medications: None     Allergies   No Known Allergies    Physical Exam   Vital Signs: Temp: 98.3  F (36.8  C) Temp src: Oral BP: 129/67 Pulse: 85   Resp: 16 SpO2: 99 % O2 Device: Nasal cannula Oxygen Delivery: 2 LPM  Weight: 0 lbs 0 oz    Gen: NAD, very pleasant  HEENT: EOMI, MMM  Resp: no crackles,  no wheezes, no increased work of resp  CV: S1S2 heard, reg rhythm, reg rate  Abdo: soft, nontender, nondistended, bowel sounds present  Ext: calves nontender, well perfused  Neuro: aa, conversing normally, moving ext, CN grossly intact, no facial asymmetry      Data   Data reviewed today: I reviewed all medications, new labs and imaging results over the last 24 hours. I personally reviewed no images or EKG's today.    Recent Labs   Lab 09/23/22  1523   WBC 9.8   HGB 8.1*   *      INR 1.16*      POTASSIUM 3.1*   CHLORIDE 100   CO2 30   BUN 14   CR 0.74   ANIONGAP 9   RAJ 8.5   *   ALBUMIN 2.6*   PROTTOTAL 7.0   BILITOTAL 0.5   ALKPHOS 66   ALT 16   AST 11   LIPASE 82     No results found for this or any previous visit (from the past 24 hour(s)).

## 2022-09-24 LAB
ANION GAP SERPL CALCULATED.3IONS-SCNC: 4 MMOL/L (ref 3–14)
BUN SERPL-MCNC: 14 MG/DL (ref 7–30)
CALCIUM SERPL-MCNC: 9 MG/DL (ref 8.5–10.1)
CHLORIDE BLD-SCNC: 104 MMOL/L (ref 94–109)
CO2 SERPL-SCNC: 34 MMOL/L (ref 20–32)
CREAT SERPL-MCNC: 0.92 MG/DL (ref 0.66–1.25)
ERYTHROCYTE [DISTWIDTH] IN BLOOD BY AUTOMATED COUNT: 15.9 % (ref 10–15)
GFR SERPL CREATININE-BSD FRML MDRD: 84 ML/MIN/1.73M2
GLUCOSE BLD-MCNC: 139 MG/DL (ref 70–99)
GLUCOSE BLDC GLUCOMTR-MCNC: 119 MG/DL (ref 70–99)
GLUCOSE BLDC GLUCOMTR-MCNC: 136 MG/DL (ref 70–99)
HCT VFR BLD AUTO: 29 % (ref 40–53)
HGB BLD-MCNC: 8.1 G/DL (ref 13.3–17.7)
MAGNESIUM SERPL-MCNC: 2.1 MG/DL (ref 1.6–2.3)
MCH RBC QN AUTO: 28.7 PG (ref 26.5–33)
MCHC RBC AUTO-ENTMCNC: 27.9 G/DL (ref 31.5–36.5)
MCV RBC AUTO: 103 FL (ref 78–100)
PLATELET # BLD AUTO: 342 10E3/UL (ref 150–450)
POTASSIUM BLD-SCNC: 3.2 MMOL/L (ref 3.4–5.3)
POTASSIUM BLD-SCNC: 3.4 MMOL/L (ref 3.4–5.3)
POTASSIUM BLD-SCNC: 4.3 MMOL/L (ref 3.4–5.3)
POTASSIUM BLD-SCNC: 4.3 MMOL/L (ref 3.4–5.3)
RBC # BLD AUTO: 2.82 10E6/UL (ref 4.4–5.9)
SODIUM SERPL-SCNC: 142 MMOL/L (ref 133–144)
WBC # BLD AUTO: 8.8 10E3/UL (ref 4–11)

## 2022-09-24 PROCEDURE — 250N000013 HC RX MED GY IP 250 OP 250 PS 637: Performed by: INTERNAL MEDICINE

## 2022-09-24 PROCEDURE — 0D738ZZ DILATION OF LOWER ESOPHAGUS, VIA NATURAL OR ARTIFICIAL OPENING ENDOSCOPIC: ICD-10-PCS | Performed by: INTERNAL MEDICINE

## 2022-09-24 PROCEDURE — 999N000157 HC STATISTIC RCP TIME EA 10 MIN

## 2022-09-24 PROCEDURE — 84132 ASSAY OF SERUM POTASSIUM: CPT | Performed by: HOSPITALIST

## 2022-09-24 PROCEDURE — 999N000099 HC STATISTIC MODERATE SEDATION < 10 MIN: Performed by: INTERNAL MEDICINE

## 2022-09-24 PROCEDURE — C9113 INJ PANTOPRAZOLE SODIUM, VIA: HCPCS | Performed by: HOSPITALIST

## 2022-09-24 PROCEDURE — 94640 AIRWAY INHALATION TREATMENT: CPT

## 2022-09-24 PROCEDURE — 94640 AIRWAY INHALATION TREATMENT: CPT | Mod: 76

## 2022-09-24 PROCEDURE — 99232 SBSQ HOSP IP/OBS MODERATE 35: CPT | Performed by: HOSPITALIST

## 2022-09-24 PROCEDURE — 250N000011 HC RX IP 250 OP 636: Performed by: HOSPITALIST

## 2022-09-24 PROCEDURE — 250N000013 HC RX MED GY IP 250 OP 250 PS 637: Performed by: HOSPITALIST

## 2022-09-24 PROCEDURE — 250N000011 HC RX IP 250 OP 636: Performed by: INTERNAL MEDICINE

## 2022-09-24 PROCEDURE — 120N000001 HC R&B MED SURG/OB

## 2022-09-24 PROCEDURE — 36415 COLL VENOUS BLD VENIPUNCTURE: CPT | Performed by: HOSPITALIST

## 2022-09-24 PROCEDURE — 83735 ASSAY OF MAGNESIUM: CPT | Performed by: INTERNAL MEDICINE

## 2022-09-24 PROCEDURE — 82310 ASSAY OF CALCIUM: CPT | Performed by: HOSPITALIST

## 2022-09-24 PROCEDURE — 85027 COMPLETE CBC AUTOMATED: CPT | Performed by: HOSPITALIST

## 2022-09-24 PROCEDURE — 250N000009 HC RX 250: Performed by: HOSPITALIST

## 2022-09-24 PROCEDURE — 43245 EGD DILATE STRICTURE: CPT | Performed by: INTERNAL MEDICINE

## 2022-09-24 RX ORDER — FENTANYL CITRATE 50 UG/ML
INJECTION, SOLUTION INTRAMUSCULAR; INTRAVENOUS PRN
Status: DISCONTINUED | OUTPATIENT
Start: 2022-09-24 | End: 2022-09-25

## 2022-09-24 RX ORDER — NALOXONE HYDROCHLORIDE 0.4 MG/ML
0.4 INJECTION, SOLUTION INTRAMUSCULAR; INTRAVENOUS; SUBCUTANEOUS
Status: DISCONTINUED | OUTPATIENT
Start: 2022-09-24 | End: 2022-09-24

## 2022-09-24 RX ORDER — NALOXONE HYDROCHLORIDE 0.4 MG/ML
0.2 INJECTION, SOLUTION INTRAMUSCULAR; INTRAVENOUS; SUBCUTANEOUS
Status: DISCONTINUED | OUTPATIENT
Start: 2022-09-24 | End: 2022-09-24

## 2022-09-24 RX ORDER — NALOXONE HYDROCHLORIDE 0.4 MG/ML
0.2 INJECTION, SOLUTION INTRAMUSCULAR; INTRAVENOUS; SUBCUTANEOUS
Status: DISCONTINUED | OUTPATIENT
Start: 2022-09-24 | End: 2022-09-28 | Stop reason: HOSPADM

## 2022-09-24 RX ORDER — ONDANSETRON 2 MG/ML
4 INJECTION INTRAMUSCULAR; INTRAVENOUS
Status: CANCELLED | OUTPATIENT
Start: 2022-09-24

## 2022-09-24 RX ORDER — LIDOCAINE 40 MG/G
CREAM TOPICAL
Status: CANCELLED | OUTPATIENT
Start: 2022-09-24

## 2022-09-24 RX ORDER — FLUMAZENIL 0.1 MG/ML
0.2 INJECTION, SOLUTION INTRAVENOUS
Status: ACTIVE | OUTPATIENT
Start: 2022-09-24 | End: 2022-09-25

## 2022-09-24 RX ORDER — NALOXONE HYDROCHLORIDE 0.4 MG/ML
0.4 INJECTION, SOLUTION INTRAMUSCULAR; INTRAVENOUS; SUBCUTANEOUS
Status: DISCONTINUED | OUTPATIENT
Start: 2022-09-24 | End: 2022-09-28 | Stop reason: HOSPADM

## 2022-09-24 RX ORDER — LIDOCAINE 40 MG/G
CREAM TOPICAL
Status: DISCONTINUED | OUTPATIENT
Start: 2022-09-24 | End: 2022-09-24 | Stop reason: HOSPADM

## 2022-09-24 RX ORDER — FUROSEMIDE 40 MG
40 TABLET ORAL DAILY
Status: DISCONTINUED | OUTPATIENT
Start: 2022-09-24 | End: 2022-09-28 | Stop reason: HOSPADM

## 2022-09-24 RX ORDER — POTASSIUM CHLORIDE 1500 MG/1
40 TABLET, EXTENDED RELEASE ORAL ONCE
Status: COMPLETED | OUTPATIENT
Start: 2022-09-24 | End: 2022-09-24

## 2022-09-24 RX ADMIN — PAROXETINE 40 MG: 40 TABLET, FILM COATED ORAL at 08:50

## 2022-09-24 RX ADMIN — PAROXETINE HYDROCHLORIDE 10 MG: 10 TABLET, FILM COATED ORAL at 08:51

## 2022-09-24 RX ADMIN — Medication 2 DROP: at 08:50

## 2022-09-24 RX ADMIN — FUROSEMIDE 40 MG: 40 TABLET ORAL at 16:43

## 2022-09-24 RX ADMIN — SUCRALFATE 1 G: 1 TABLET ORAL at 08:49

## 2022-09-24 RX ADMIN — METHENAMINE HIPPURATE 1 G: 1 TABLET ORAL at 20:50

## 2022-09-24 RX ADMIN — IPRATROPIUM BROMIDE AND ALBUTEROL SULFATE 3 ML: .5; 3 SOLUTION RESPIRATORY (INHALATION) at 20:10

## 2022-09-24 RX ADMIN — METOPROLOL TARTRATE 12.5 MG: 25 TABLET, FILM COATED ORAL at 20:50

## 2022-09-24 RX ADMIN — MIDAZOLAM 1 MG: 1 INJECTION INTRAMUSCULAR; INTRAVENOUS at 09:36

## 2022-09-24 RX ADMIN — SUCRALFATE 1 G: 1 TABLET ORAL at 20:50

## 2022-09-24 RX ADMIN — SUCRALFATE 1 G: 1 TABLET ORAL at 14:58

## 2022-09-24 RX ADMIN — METOPROLOL TARTRATE 12.5 MG: 25 TABLET, FILM COATED ORAL at 08:47

## 2022-09-24 RX ADMIN — ALLOPURINOL 300 MG: 300 TABLET ORAL at 08:50

## 2022-09-24 RX ADMIN — POTASSIUM CHLORIDE 40 MEQ: 1500 TABLET, EXTENDED RELEASE ORAL at 03:29

## 2022-09-24 RX ADMIN — IPRATROPIUM BROMIDE AND ALBUTEROL SULFATE 3 ML: .5; 3 SOLUTION RESPIRATORY (INHALATION) at 07:53

## 2022-09-24 RX ADMIN — FENTANYL CITRATE 50 MCG: 50 INJECTION, SOLUTION INTRAMUSCULAR; INTRAVENOUS at 09:35

## 2022-09-24 RX ADMIN — POLYETHYLENE GLYCOL 3350, SODIUM SULFATE ANHYDROUS, SODIUM BICARBONATE, SODIUM CHLORIDE, POTASSIUM CHLORIDE 4000 ML: 236; 22.74; 6.74; 5.86; 2.97 POWDER, FOR SOLUTION ORAL at 20:50

## 2022-09-24 RX ADMIN — PANTOPRAZOLE SODIUM 40 MG: 40 INJECTION, POWDER, FOR SOLUTION INTRAVENOUS at 08:49

## 2022-09-24 RX ADMIN — PANTOPRAZOLE SODIUM 40 MG: 40 INJECTION, POWDER, FOR SOLUTION INTRAVENOUS at 14:58

## 2022-09-24 RX ADMIN — METHENAMINE HIPPURATE 1 G: 1 TABLET ORAL at 08:50

## 2022-09-24 RX ADMIN — UMECLIDINIUM BROMIDE AND VILANTEROL TRIFENATATE 1 PUFF: 62.5; 25 POWDER RESPIRATORY (INHALATION) at 08:52

## 2022-09-24 RX ADMIN — Medication 2 DROP: at 20:50

## 2022-09-24 ASSESSMENT — ACTIVITIES OF DAILY LIVING (ADL)
ADLS_ACUITY_SCORE: 36
ADLS_ACUITY_SCORE: 38
ADLS_ACUITY_SCORE: 44
ADLS_ACUITY_SCORE: 44
ADLS_ACUITY_SCORE: 38

## 2022-09-24 NOTE — PLAN OF CARE
A&O x4. VSS on 1L. Up with A2/lift. NPO overnight. Tele SR. JANI Cardona with [facility].  Pt denies pain. Plan to for EGD today. Continue to monitor.

## 2022-09-24 NOTE — PROGRESS NOTES
"[facility]    Medicine Progress Note - Hospitalist Service    Date of Admission:  9/23/2022    Assessment & Plan        Ron Gilmore is a 82 year old male with history of morbid obesity, type 2 diabetes, COPD, iron deficiency anemia, chronic infection 2 L, among others who presented to the ED with complaints of abdominal discomfort and black stool.  Patient came via EMS from [facility].  Patient has had these symptoms intermittently for years but more recently have increased and his stomach feels \"stirred up\".  He reports black stool as well.  He has not had any vomiting but has had some nausea with the discomfort at times.  He does have a history of similar symptoms last year and was seen by Dr. Davis where EGD was fairly unremarkable and colon showed diverticula and polyps.  Otherwise, patient denies recent illness-no fevers, chills, cough, shortness of breath, chest pain, dysuria, or lower extremity edema.  He has been taking his medications which do include aspirin, pantoprazole, and sucralfate among others.     Melena  Anemia, unclear chronicity, likely in part due to GI blood loss  History of iron deficiency anemia, per chart review  History of GI bleed  History of diverticulosis and colon polyps  Patient with worsening abdominal discomfort and melena which previously had been intermittent over the last few years.  Of note his hemoglobin in May was around 12 and today 8.1.  He denies dizziness or lightheadedness.  No vomiting.  He has been taking his Protonix and sucralfate.  He is on aspirin.  He did see Dr. Davis last year with unremarkable EGD and colon showing diverticula and polyps.  - Admit to inpatient  - Type and screen and consent for blood transfusion (if needed) he has been done in the ER  - Transfuse for less than 7 or active bleeding  - hgb remains stable around 8  - GI consult-Dr. Davis  - continue PPI BID  - 9/24 -EGD reportedly unremarkable without etiology " of anemia/bleeding.  GI planning towards colonoscopy tomorrow otherwise outpatient capsule endoscopy    Hypokalemia  Potassium 3.1 upon admission.  Likely due to decreased oral intake while feeling unwell as above.  - Replace as able  - K improving     Chronic hypoxic respiratory failure-on 2 L nasal cannula at home  Stable upon admission.  Continue supplemental oxygen.     Morbid obesity  Lifestyle modifications as able with PCP follow-up.     Chronic indwelling chirinos  In place.  Denies blood in urine. No new issues, states he has used chirinos for a long time. Continue.         Diet: NPO for Medical/Clinical Reasons Except for: Meds, Ice Chips    DVT Prophylaxis: Pneumatic Compression Devices  Chirinos Catheter: PRESENT, indication: Retention  Central Lines: None  Cardiac Monitoring: ACTIVE order. Indication: gi bleed concern  Code Status: No CPR- Do NOT Intubate      Disposition Plan      Expected Discharge Date: 09/25/2022                The patient's care was discussed with the Bedside Nurse, Patient and GI Consultant.    Kiet Marcos MD  Hospitalist Service  [facility]  Securely message with the [employer] Web Console (learn more here)  Text page via [employer] Paging/Directory         Clinically Significant Risk Factors Present on Admission             # Hypoalbuminemia: Albumin = 2.6 g/dL (Ref range: 3.4 - 5.0 g/dL) on admission, will monitor as appropriate   # Coagulation Defect: INR = 1.16 (Ref range: 0.85 - 1.15) and/or PTT = N/A on admission, will monitor for bleeding            ______________________________________________________________________    Interval History   Patient seen and examined midday.  Little groggy from EGD but otherwise no new complaints.  No abdominal pain, fevers, chills, shortness of breath or cough.  GI reported the EGD was unremarkable, plan for colonoscopy tomorrow    Data reviewed today: I reviewed all medications, new labs and imaging results over the last 24  hours. I personally reviewed no images or EKG's today.    Physical Exam   Vital Signs: Temp: 98.6  F (37  C) Temp src: Oral BP: 131/72 Pulse: 79   Resp: 16 SpO2: 95 % O2 Device: Nasal cannula Oxygen Delivery: 1 LPM  Weight: 230 lbs 6.4 oz    Gen: NAD, pleasant  HEENT: EOMI, MMM  Resp: no crackles,  no wheezes, no increased work of resp  CV: S1S2 heard, reg rhythm, reg rate  Abdo: soft, nontender, nondistended, bowel sounds present  Ext: calves nontender, well perfused  Neuro: aa, conversant, CN grossly intact, no facial asymmetry      Data   Recent Labs   Lab 09/24/22  0835 09/24/22  0451 09/24/22  0211 09/24/22  0023 09/23/22  2303 09/23/22  2038 09/23/22  1523   WBC 8.8  --   --   --   --   --  9.8   HGB 8.1*  --   --   --  7.9*  --  8.1*   *  --   --   --   --   --  104*     --   --   --   --   --  374   INR  --   --   --   --   --   --  1.16*     --   --   --   --   --  139   POTASSIUM 4.3  4.3 3.4  --  3.2*  --   --  3.1*   CHLORIDE 104  --   --   --   --   --  100   CO2 34*  --   --   --   --   --  30   BUN 14  --   --   --   --   --  14   CR 0.92  --   --   --   --   --  0.74   ANIONGAP 4  --   --   --   --   --  9   RAJ 9.0  --   --   --   --   --  8.5   *  --  119*  --   --  123* 169*   ALBUMIN  --   --   --   --   --   --  2.6*   PROTTOTAL  --   --   --   --   --   --  7.0   BILITOTAL  --   --   --   --   --   --  0.5   ALKPHOS  --   --   --   --   --   --  66   ALT  --   --   --   --   --   --  16   AST  --   --   --   --   --   --  11   LIPASE  --   --   --   --   --   --  82     No results found for this or any previous visit (from the past 24 hour(s)).

## 2022-09-24 NOTE — UTILIZATION REVIEW
"Admission Status; Secondary Review Determination     Admission Date: 9/23/2022  3:03 PM       Under the authority of the Utilization Management Committee, the utilization review process indicated a secondary review on the above patient.  The review outcome is based on review of the medical records, discussions with staff, and applying clinical experience noted on the date of the review.        (x)      Inpatient Status Appropriate - This patient's medical care is consistent with medical management for inpatient care and reasonable inpatient medical practice.      () Observation Status Appropriate - This patient does not meet hospital inpatient criteria and is placed in observation status. If this patient's primary payer is Medicare and was admitted as an inpatient, Condition Code 44 should be used and patient status changed to \"observation\".   () Admission Status NOT Appropriate - This patient's medical care is not consistent with medical management for Inpatient or Observation Status.        () Outpatient Procedure Status Appropriate - Procedure not on Medicare Inpatient list and no complications at the time of this review       RATIONALE FOR DETERMINATION      Brief clinical presentation, information copied from the chart, abbreviated and edited for relevant content:     Ron Gilmore is a 82 year old male with history of morbid obesity, type 2 diabetes, COPD, iron deficiency anemia, chronic infection 2 L, among others who presented with complaints of abdominal discomfort and black stool.  His hemoglobin in May was around 12 and today 8.1.  Type and screen and consent for blood transfusion (if needed) he has been done in the ER  Plan to Transfuse for less than 7 or active bleeding  9/24 -EGD reportedly unremarkable without etiology of anemia/bleeding.  GI planning towards colonoscopy tomorrow otherwise outpatient capsule endoscopy     Discussed with attending, patient not clinically doing well, likely will need " 1-2 more nights for treatment and work up. Keep IP          In summary, the severity of illness, intensity of service provided, expected length of stay and risk for adverse outcome make the care complex, high risk and appropriate for hospital admission.        The information on this document is developed by the utilization review team in order for the business office to ensure compliance.  This only denotes the appropriateness of proper admission status and does not reflect the quality of care rendered.         The definitions of Inpatient Status and Observation Status used in making the determination above are those provided in the CMS Coverage Manual, Chapter 1 and Chapter 6, section 70.4.      Sincerely,      Kristal Elias MD   Utilization Review/ Case Management  [facility].

## 2022-09-24 NOTE — CONSULTS
"Ron Gilmore is a 82 year old male with history of morbid obesity, type 2 diabetes, COPD, iron deficiency anemia, chronic infection 2 L, among others who presented to the ED with complaints of abdominal discomfort and black stool.  Patient came via EMS from [facility].  Patient has had these symptoms intermittently for years but more recently have increased and his stomach feels \"stirred up\".  He reports black stool as well.  He has not had any vomiting but has had some nausea with the discomfort at times.  Positive occult blood  Negative EGD and colonoscopy for bleed in May.  Continue on I/V Protonix  NPO after midnight.  EGD in AM.    Kofi Davis MD FACP  [facility]    "

## 2022-09-24 NOTE — PLAN OF CARE
Received pt 2000, pt is AOx4, on 1L baseline pt pt reports, tolerated some clears for shift, NPO after midnight, smear black stool, repositioned q2hrs, chronic Cardona wnl, meds whole in applesauce, nectar thick liquid per pt report, ok to eat regular diet per pt, L side weakness baseline, sacral wound noted, assist x2 with lift, pending EGD in morning.

## 2022-09-24 NOTE — PROGRESS NOTES
EGD completed today with no findings. Discussed plan with pt for colonoscopy tomorrow, he's very much against doing this and would like to discuss other options with the provider tomorrow. Tolerating clear liquids, nectar thick consistency ever since stroke a couple of years ago. VSS on room air. Chronic chirinos in place. Denies pain. A&Ox4, forgetful. Baseline on 2L O2. GI following.

## 2022-09-25 LAB
GLUCOSE BLDC GLUCOMTR-MCNC: 134 MG/DL (ref 70–99)
HGB BLD-MCNC: 8.6 G/DL (ref 13.3–17.7)
MAGNESIUM SERPL-MCNC: 1.9 MG/DL (ref 1.6–2.3)
POTASSIUM BLD-SCNC: 3.6 MMOL/L (ref 3.4–5.3)

## 2022-09-25 PROCEDURE — 94640 AIRWAY INHALATION TREATMENT: CPT | Mod: 76

## 2022-09-25 PROCEDURE — 99232 SBSQ HOSP IP/OBS MODERATE 35: CPT | Performed by: HOSPITALIST

## 2022-09-25 PROCEDURE — 84132 ASSAY OF SERUM POTASSIUM: CPT | Performed by: HOSPITALIST

## 2022-09-25 PROCEDURE — 250N000013 HC RX MED GY IP 250 OP 250 PS 637: Performed by: HOSPITALIST

## 2022-09-25 PROCEDURE — C9113 INJ PANTOPRAZOLE SODIUM, VIA: HCPCS | Performed by: HOSPITALIST

## 2022-09-25 PROCEDURE — 999N000157 HC STATISTIC RCP TIME EA 10 MIN

## 2022-09-25 PROCEDURE — 36415 COLL VENOUS BLD VENIPUNCTURE: CPT | Performed by: HOSPITALIST

## 2022-09-25 PROCEDURE — 250N000011 HC RX IP 250 OP 636: Performed by: HOSPITALIST

## 2022-09-25 PROCEDURE — 120N000001 HC R&B MED SURG/OB

## 2022-09-25 PROCEDURE — 85018 HEMOGLOBIN: CPT | Performed by: HOSPITALIST

## 2022-09-25 PROCEDURE — 250N000009 HC RX 250: Performed by: HOSPITALIST

## 2022-09-25 PROCEDURE — 83735 ASSAY OF MAGNESIUM: CPT | Performed by: HOSPITALIST

## 2022-09-25 PROCEDURE — 94640 AIRWAY INHALATION TREATMENT: CPT

## 2022-09-25 RX ADMIN — Medication 2 DROP: at 21:25

## 2022-09-25 RX ADMIN — PANTOPRAZOLE SODIUM 40 MG: 40 INJECTION, POWDER, FOR SOLUTION INTRAVENOUS at 08:40

## 2022-09-25 RX ADMIN — SUCRALFATE 1 G: 1 TABLET ORAL at 13:12

## 2022-09-25 RX ADMIN — SUCRALFATE 1 G: 1 TABLET ORAL at 21:25

## 2022-09-25 RX ADMIN — PANTOPRAZOLE SODIUM 40 MG: 40 INJECTION, POWDER, FOR SOLUTION INTRAVENOUS at 12:45

## 2022-09-25 RX ADMIN — METOPROLOL TARTRATE 12.5 MG: 25 TABLET, FILM COATED ORAL at 08:40

## 2022-09-25 RX ADMIN — UMECLIDINIUM BROMIDE AND VILANTEROL TRIFENATATE 1 PUFF: 62.5; 25 POWDER RESPIRATORY (INHALATION) at 08:40

## 2022-09-25 RX ADMIN — PAROXETINE 40 MG: 40 TABLET, FILM COATED ORAL at 08:41

## 2022-09-25 RX ADMIN — ONDANSETRON 4 MG: 2 INJECTION INTRAMUSCULAR; INTRAVENOUS at 01:30

## 2022-09-25 RX ADMIN — Medication 2 DROP: at 08:40

## 2022-09-25 RX ADMIN — IPRATROPIUM BROMIDE AND ALBUTEROL SULFATE 3 ML: .5; 3 SOLUTION RESPIRATORY (INHALATION) at 19:11

## 2022-09-25 RX ADMIN — IPRATROPIUM BROMIDE AND ALBUTEROL SULFATE 3 ML: .5; 3 SOLUTION RESPIRATORY (INHALATION) at 08:18

## 2022-09-25 RX ADMIN — METHENAMINE HIPPURATE 1 G: 1 TABLET ORAL at 08:39

## 2022-09-25 RX ADMIN — SUCRALFATE 1 G: 1 TABLET ORAL at 08:40

## 2022-09-25 RX ADMIN — METOPROLOL TARTRATE 12.5 MG: 25 TABLET, FILM COATED ORAL at 21:25

## 2022-09-25 RX ADMIN — METHENAMINE HIPPURATE 1 G: 1 TABLET ORAL at 21:25

## 2022-09-25 RX ADMIN — PAROXETINE HYDROCHLORIDE 10 MG: 10 TABLET, FILM COATED ORAL at 08:41

## 2022-09-25 RX ADMIN — FUROSEMIDE 40 MG: 40 TABLET ORAL at 08:40

## 2022-09-25 ASSESSMENT — ACTIVITIES OF DAILY LIVING (ADL)
ADLS_ACUITY_SCORE: 43
ADLS_ACUITY_SCORE: 44
ADLS_ACUITY_SCORE: 44
ADLS_ACUITY_SCORE: 49
ADLS_ACUITY_SCORE: 50
ADLS_ACUITY_SCORE: 44
ADLS_ACUITY_SCORE: 49
ADLS_ACUITY_SCORE: 44
ADLS_ACUITY_SCORE: 43
ADLS_ACUITY_SCORE: 50
ADLS_ACUITY_SCORE: 49
ADLS_ACUITY_SCORE: 50

## 2022-09-25 NOTE — PLAN OF CARE
Pt is AOx4, L side weakness, abdomen soft, nontender, denies pain, BS +, no BM today yet, declined bowel prep but changed his mind at 1900, prep thickened for pt's dysphagia, meds whole in apple sauce, Cardona chronic wnl, Tele SR, sacral dressing in place. Repositioned q2hrs, pt refused at times, edu provided, assist x2 with lift, pending colonoscopy in am.

## 2022-09-25 NOTE — PLAN OF CARE
Goal Outcome Evaluation:    Plan of Care Reviewed With: patient     A&Ox4, VSS on 2L NC, up w/ A2/ceiling lift. PIV SL. Denies pain. Int nausea, zofran given x1. Pt has L sided weakness. Cardona in place w/ clear yellow UOP. Mepilex to sacrum. Pt supposed to complete bowel prep overnight for possible colonoscopy today, finished 1000mL and refused the rest. No BM overnight, BS +, passing gas. Tele on w/ NSR. Turn/repo q2 hours.

## 2022-09-25 NOTE — PROGRESS NOTES
"[facility] [place]    Medicine Progress Note - Hospitalist Service    Date of Admission:  9/23/2022    Assessment & Plan        Ron Gilmore is a 82 year old male with history of morbid obesity, type 2 diabetes, COPD, iron deficiency anemia, chronic infection 2 L, among others who presented to the ED with complaints of abdominal discomfort and black stool.  Patient came via EMS from [facility].  Patient has had these symptoms intermittently for years but more recently have increased and his stomach feels \"stirred up\".  He reports black stool as well.  He has not had any vomiting but has had some nausea with the discomfort at times.  He does have a history of similar symptoms last year and was seen by Dr. Davis where EGD was fairly unremarkable and colon showed diverticula and polyps.  Otherwise, patient denies recent illness-no fevers, chills, cough, shortness of breath, chest pain, dysuria, or lower extremity edema.  He has been taking his medications which do include aspirin, pantoprazole, and sucralfate among others.     Melena  Anemia, unclear chronicity, likely in part due to GI blood loss  History of iron deficiency anemia, per chart review  History of GI bleed  History of diverticulosis and colon polyps  Patient with worsening abdominal discomfort and melena which previously had been intermittent over the last few years.  Of note his hemoglobin in May was around 12 and today 8.1.  He denies dizziness or lightheadedness.  No vomiting.  He has been taking his Protonix and sucralfate.  He is on aspirin.  He did see Dr. Davis last year with unremarkable EGD and colon showing diverticula and polyps.  - Admit to inpatient  - Type and screen and consent for blood transfusion (if needed) he has been done in the ER  - Transfuse for less than 7 or active bleeding  - hgb remains stable around 8  - GI consult-Dr. Davis  - continue PPI BID  - 9/24 -EGD reportedly unremarkable without etiology " of anemia/bleeding.  GI planning towards colonoscopy tomorrow otherwise outpatient capsule endoscopy  - 9/25 - feeling fine but couldn't get thru prep last night, hoping GI will offer capsule endoscopy as outpatient as he does not feel he can do colonoscopy. Will await Dr Davis's recs.    - possible discharge home tomorrow  -- hgb stable/improved at 8.6     Hypokalemia  Potassium 3.1 upon admission.  Likely due to decreased oral intake while feeling unwell as above.  - Replace as able  - K improving/normalized     Chronic hypoxic respiratory failure-on 2 L nasal cannula at home  Stable upon admission.  Continue supplemental oxygen.     Morbid obesity  Lifestyle modifications as able with PCP follow-up.     Chronic indwelling chirinos  In place.  Denies blood in urine. No new issues, states he has used chirinos for a long time. Continue.            Diet: Clear Liquid Diet  Room Service    DVT Prophylaxis: Pneumatic Compression Devices  Chirinos Catheter: PRESENT, indication:  (chronic)  Central Lines: None  Cardiac Monitoring: ACTIVE order. Indication: gi bleed concern  Code Status: No CPR- Do NOT Intubate      Disposition Plan      Expected Discharge Date: 09/26/2022                The patient's care was discussed with the Bedside Nurse and Patient.    Kiet Marcos MD  Hospitalist Service  [facility]  Securely message with the Vocera Web Console (learn more here)  Text page via [employer]pot Paging/Directory         Clinically Significant Risk Factors Present on Admission               # Obesity: Estimated body mass index is 31.25 kg/m  as calculated from the following:    Height as of this encounter: 1.829 m (6').    Weight as of this encounter: 104.5 kg (230 lb 6.4 oz).        ______________________________________________________________________    Interval History   Seen and examined early afternoon. Feeling fine but couldn't do prep last night. Denies new pain, sob, fevers or chills. No more BM.      Data reviewed today: I reviewed all medications, new labs and imaging results over the last 24 hours. I personally reviewed no images or EKG's today.    Physical Exam   Vital Signs: Temp: 99.5  F (37.5  C) Temp src: Oral BP: 108/61 Pulse: 92   Resp: 16 SpO2: 94 % O2 Device: Nasal cannula Oxygen Delivery: 2 LPM  Weight: 230 lbs 6.4 oz    Gen: NAD, pleasant  HEENT: EOMI, MMM  Resp: no focal crackles,  no wheezes, no increased work of resp  CV: S1S2 heard, reg rhythm, reg rate  Abdo: soft, nontender, nondistended, bowel sounds present  Ext: calves nontender, well perfused  Neuro: aa, conversing normally, CN grossly intact, no facial asymmetry      Data   Recent Labs   Lab 09/25/22  0854 09/25/22  0819 09/25/22  0556 09/24/22  2235 09/24/22  0835 09/24/22  0451 09/24/22  0023 09/23/22  2303 09/23/22  2038 09/23/22  1523   WBC  --   --   --   --  8.8  --   --   --   --  9.8   HGB  --   --  8.6*  --  8.1*  --   --  7.9*  --  8.1*   MCV  --   --   --   --  103*  --   --   --   --  104*   PLT  --   --   --   --  342  --   --   --   --  374   INR  --   --   --   --   --   --   --   --   --  1.16*   NA  --   --   --   --  142  --   --   --   --  139   POTASSIUM 3.6  --   --   --  4.3  4.3 3.4   < >  --   --  3.1*   CHLORIDE  --   --   --   --  104  --   --   --   --  100   CO2  --   --   --   --  34*  --   --   --   --  30   BUN  --   --   --   --  14  --   --   --   --  14   CR  --   --   --   --  0.92  --   --   --   --  0.74   ANIONGAP  --   --   --   --  4  --   --   --   --  9   RAJ  --   --   --   --  9.0  --   --   --   --  8.5   GLC  --  134*  --  136* 139*  --    < >  --    < > 169*   ALBUMIN  --   --   --   --   --   --   --   --   --  2.6*   PROTTOTAL  --   --   --   --   --   --   --   --   --  7.0   BILITOTAL  --   --   --   --   --   --   --   --   --  0.5   ALKPHOS  --   --   --   --   --   --   --   --   --  66   ALT  --   --   --   --   --   --   --   --   --  16   AST  --   --   --   --   --   --   --    --   --  11   LIPASE  --   --   --   --   --   --   --   --   --  82    < > = values in this interval not displayed.     No results found for this or any previous visit (from the past 24 hour(s)).

## 2022-09-25 NOTE — PROGRESS NOTES
A/Ox4, VSS on 2L NC, denies pain, N/V.Tele NSR, PIV SL, taking adequate PO, on clears. Turn/repo, 2 assist with lift, chirinos patent, no BM on shift. Left sided weakness, intermittent productive cough present, on thickened liquids. Sacral mepilex in place, scattered bruising. Electrolytes within range, redraw in AM, discharge pending.

## 2022-09-26 ENCOUNTER — APPOINTMENT (OUTPATIENT)
Dept: GENERAL RADIOLOGY | Facility: CLINIC | Age: 82
DRG: 377 | End: 2022-09-26
Attending: HOSPITALIST
Payer: MEDICARE

## 2022-09-26 LAB
HGB BLD-MCNC: 8.5 G/DL (ref 13.3–17.7)
MAGNESIUM SERPL-MCNC: 1.9 MG/DL (ref 1.6–2.3)
NT-PROBNP SERPL-MCNC: 321 PG/ML (ref 0–1800)
POTASSIUM BLD-SCNC: 3.5 MMOL/L (ref 3.4–5.3)
PROCALCITONIN SERPL-MCNC: 0.19 NG/ML
UPPER GI ENDOSCOPY: NORMAL

## 2022-09-26 PROCEDURE — C9113 INJ PANTOPRAZOLE SODIUM, VIA: HCPCS | Performed by: HOSPITALIST

## 2022-09-26 PROCEDURE — 999N000157 HC STATISTIC RCP TIME EA 10 MIN

## 2022-09-26 PROCEDURE — 250N000009 HC RX 250: Performed by: HOSPITALIST

## 2022-09-26 PROCEDURE — 85018 HEMOGLOBIN: CPT | Performed by: HOSPITALIST

## 2022-09-26 PROCEDURE — 84132 ASSAY OF SERUM POTASSIUM: CPT | Performed by: INTERNAL MEDICINE

## 2022-09-26 PROCEDURE — 250N000011 HC RX IP 250 OP 636: Performed by: HOSPITALIST

## 2022-09-26 PROCEDURE — 120N000001 HC R&B MED SURG/OB

## 2022-09-26 PROCEDURE — 83880 ASSAY OF NATRIURETIC PEPTIDE: CPT | Performed by: HOSPITALIST

## 2022-09-26 PROCEDURE — 250N000013 HC RX MED GY IP 250 OP 250 PS 637: Performed by: HOSPITALIST

## 2022-09-26 PROCEDURE — G0463 HOSPITAL OUTPT CLINIC VISIT: HCPCS

## 2022-09-26 PROCEDURE — 83735 ASSAY OF MAGNESIUM: CPT | Performed by: INTERNAL MEDICINE

## 2022-09-26 PROCEDURE — 36415 COLL VENOUS BLD VENIPUNCTURE: CPT | Performed by: INTERNAL MEDICINE

## 2022-09-26 PROCEDURE — 71045 X-RAY EXAM CHEST 1 VIEW: CPT

## 2022-09-26 PROCEDURE — 94640 AIRWAY INHALATION TREATMENT: CPT

## 2022-09-26 PROCEDURE — 94640 AIRWAY INHALATION TREATMENT: CPT | Mod: 76

## 2022-09-26 PROCEDURE — 84145 PROCALCITONIN (PCT): CPT | Performed by: HOSPITALIST

## 2022-09-26 PROCEDURE — 99232 SBSQ HOSP IP/OBS MODERATE 35: CPT | Performed by: HOSPITALIST

## 2022-09-26 RX ORDER — FUROSEMIDE 10 MG/ML
40 INJECTION INTRAMUSCULAR; INTRAVENOUS ONCE
Status: COMPLETED | OUTPATIENT
Start: 2022-09-26 | End: 2022-09-26

## 2022-09-26 RX ADMIN — FUROSEMIDE 40 MG: 40 TABLET ORAL at 08:08

## 2022-09-26 RX ADMIN — Medication 2 DROP: at 08:08

## 2022-09-26 RX ADMIN — METOPROLOL TARTRATE 12.5 MG: 25 TABLET, FILM COATED ORAL at 23:07

## 2022-09-26 RX ADMIN — METOPROLOL TARTRATE 12.5 MG: 25 TABLET, FILM COATED ORAL at 08:08

## 2022-09-26 RX ADMIN — IPRATROPIUM BROMIDE AND ALBUTEROL SULFATE 3 ML: .5; 3 SOLUTION RESPIRATORY (INHALATION) at 19:17

## 2022-09-26 RX ADMIN — UMECLIDINIUM BROMIDE AND VILANTEROL TRIFENATATE 1 PUFF: 62.5; 25 POWDER RESPIRATORY (INHALATION) at 08:08

## 2022-09-26 RX ADMIN — FUROSEMIDE 40 MG: 10 INJECTION, SOLUTION INTRAMUSCULAR; INTRAVENOUS at 18:51

## 2022-09-26 RX ADMIN — METHENAMINE HIPPURATE 1 G: 1 TABLET ORAL at 08:08

## 2022-09-26 RX ADMIN — IPRATROPIUM BROMIDE AND ALBUTEROL SULFATE 3 ML: .5; 3 SOLUTION RESPIRATORY (INHALATION) at 07:43

## 2022-09-26 RX ADMIN — PANTOPRAZOLE SODIUM 40 MG: 40 INJECTION, POWDER, FOR SOLUTION INTRAVENOUS at 08:06

## 2022-09-26 RX ADMIN — SUCRALFATE 1 G: 1 TABLET ORAL at 08:07

## 2022-09-26 RX ADMIN — PAROXETINE 40 MG: 40 TABLET, FILM COATED ORAL at 08:07

## 2022-09-26 RX ADMIN — Medication 2 DROP: at 23:08

## 2022-09-26 RX ADMIN — PAROXETINE HYDROCHLORIDE 10 MG: 10 TABLET, FILM COATED ORAL at 08:08

## 2022-09-26 RX ADMIN — METHENAMINE HIPPURATE 1 G: 1 TABLET ORAL at 23:07

## 2022-09-26 ASSESSMENT — ACTIVITIES OF DAILY LIVING (ADL)
ADLS_ACUITY_SCORE: 48
ADLS_ACUITY_SCORE: 50
ADLS_ACUITY_SCORE: 44
ADLS_ACUITY_SCORE: 48
ADLS_ACUITY_SCORE: 44
ADLS_ACUITY_SCORE: 50
ADLS_ACUITY_SCORE: 50

## 2022-09-26 NOTE — CONSULTS
Care Management Initial Consult    General Information  Assessment completed with: Patient, Care Team Member, RYLAN-chart review, left  for nurse Iyer at [facility] to call back  Type of CM/SW Visit: Initial Assessment    Primary Care Provider verified and updated as needed: Yes   Readmission within the last 30 days: no previous admission in last 30 days      Reason for Consult: discharge planning  Advance Care Planning:            Communication Assessment  Patient's communication style: spoken language (English or Bilingual)    Hearing Difficulty or Deaf: no   Wear Glasses or Blind: no    Cognitive  Cognitive/Neuro/Behavioral: .WDL except, arousability  Level of Consciousness: lethargic  Arousal Level: arouses to voice, arouses to touch/gentle shaking, opens eyes spontaneously  Orientation: oriented x 4  Mood/Behavior: calm, cooperative  Best Language: 1 - Mild to moderate  Speech: clear, logical    Living Environment:   People in home: facility resident     Current living Arrangements: assisted living  Name of Facility: [facility]   Able to return to prior arrangements: yes       Family/Social Support:  Care provided by: other (see comments) (staff, self)  Provides care for: no one, unable/limited ability to care for self  Marital Status:   Wife, Children, Facility resident(s)/Staff          Description of Support System:           Current Resources:   Patient receiving home care services:  Waiting call back to confirm     Community Resources:    Equipment currently used at home: lift device  Supplies currently used at home:      Employment/Financial:  Employment Status: retired        Financial Concerns: No concerns identified   Referral to Financial Worker: No       Lifestyle & Psychosocial Needs:  Social Determinants of Health     Tobacco Use: Medium Risk     Smoking Tobacco Use: Former Smoker     Smokeless Tobacco Use: Never Used   Alcohol Use: Not on file   Financial Resource  Strain: Not on file   Food Insecurity: Not on file   Transportation Needs: Not on file   Physical Activity: Not on file   Stress: Not on file   Social Connections: Not on file   Intimate Partner Violence: Not on file   Depression: Not on file   Housing Stability: Not on file       Functional Status:  Prior to admission patient needed assistance: lift dependent at [facility]             Mental Health Status:  Mental Health Status: No Current Concerns       Chemical Dependency Status:  Chemical Dependency Status: No Current Concerns             Values/Beliefs:  Spiritual, Cultural Beliefs, Druze Practices, Values that affect care: no               Additional Information:  Initial consult done with patient and then call to Shireen BLACK at [facility] phone [phone]. [facility], spouse and daughter. Per chart review and per paperwork sent with patient. Shannon resides in the [facility] which is Extended Stay [facility] at [facility]. Patient states he is moved by a aleks lift throughout his room. Will await call back from Shireen to determine if patient open to home care services. Patient states he wears oxygen all the time and per records oxygen is through [facility].    Patient gives verbal permission to speak to wife, daughter and BRAYDON.     1225  Call back from Shireen at [facility] received. Updated RN that discharge is anticipated for tomorrow. Shireen reports would like return the earlier the better. Discharge [facility] orders to be faxed to [phone]. Shireen confirms [facility] agency of [facility] home Care.     Writer called [facility] and spoke to intake and confirmed skilled RN services for chirinos catheter cares, 2 x a week now and monthly chirinos catheter changes. Discharge resumption orders for home care to be faxed to [phone].    Shireen states patient can get to appointments in his electric wheelchair but it is very hard for him to leave for appointments. Sihreen reports  patient spends much of his time in his room. He gets total cares per Shireen, bathing, laundry, medications, showers, grooming, transferring etc.     1528 discussed with provider. Will plan for early afternoon return to facility on Tuesday pending medical clearance in AM. Writer called [facility] at [phone] and spoke to Gabriela Stretcher ride coordinated for 1300 on Tuesday 9/27 for patient to return to his [facility] .    Sandy Bone RN   [facility]   Phone [phone]

## 2022-09-26 NOTE — PROGRESS NOTES
[facility]  Gastroenterology Progress Note     Ron Gilmore MRN# [id number]   YOB: 1940 Age: 82 year old          Assessment and Plan:   Ron Gilmore noemy  82 year old male with PMHx of morbid obesity, type 2 diabetes, COPD, iron deficiency anemia, admitted on 9/23 with c/o melena.    Gastrointestinal hemorrhage, unspecified gastrointestinal hemorrhage type  Anemia due to blood loss, acute  9/24 -EGD reportedly unremarkable without etiology of anemia/bleeding  Planned colonoscopy on following day but unable to finish prep.  Hemoglobin stable in mid 8 range    -- plan outpatient capsule endoscopy and no plans for colonoscopy inpatient at this time  -- Monitor hemoglobin  -- GI will follow along  -- If has visible blood loss in stool or significant drop in hemoglobin could consider bleeding scan  -- Ok with GI to advance to regular diet    Had increase in oxygen demand since yesterday- consider pneumonia in differential. Differ to hospitalist for recommendations              Interval History:   denies chest pain, denies abdominal pain, alert, oriented to person, place and time, has had a bowel movement in the last 24 hours and shortness of breath              Review of Systems:   C: NEGATIVE for fever, chills, change in weight  E/M: NEGATIVE for ear, mouth and throat problems  R: NEGATIVE for significant cough or SOB  CV: NEGATIVE for chest pain, palpitations or peripheral edema             Medications:   I have reviewed this patient's current medications    carboxymethylcellulose PF  2 drop Both Eyes BID     furosemide  40 mg Oral Daily     ipratropium - albuterol 0.5 mg/2.5 mg/3 mL  1 vial Nebulization BID     methenamine hippurate  1 g Oral BID     metoprolol tartrate  12.5 mg Oral BID     pantoprazole  40 mg Intravenous BID 09 12     PARoxetine  10 mg Oral QAM     PARoxetine  40 mg Oral QAM     potassium chloride  40 mEq Oral or Feeding Tube Once     sodium chloride (PF)   3 mL Intracatheter Q8H     sucralfate  1 g Oral TID     umeclidinium-vilanterol  1 puff Inhalation Daily                  Physical Exam:   Vitals were reviewed  Vital Signs with Ranges  Temp:  [98.6  F (37  C)-99.2  F (37.3  C)] 98.8  F (37.1  C)  Pulse:  [] 109  Resp:  [18] 18  BP: (104-130)/(54-73) 109/73  SpO2:  [80 %-93 %] 92 %  I/O last 3 completed shifts:  In: 1120 [P.O.:1120]  Out: 750 [Urine:750]   Constitutional: healthy, alert, mild distress, severe distress and pale   Cardiovascular: negative, PMI normal. No lifts, heaves, or thrills. RRR. No murmurs, clicks gallops or rub  Respiratory: negative, Percussion normal. Good diaphragmatic excursion. Lungs clear  Abdomen: Abdomen soft, non-tender. BS normal. No masses, organomegaly           Data:   I reviewed the patient's new clinical lab test results.   Recent Labs   Lab Test 09/26/22  0656 09/25/22  0556 09/24/22  0835 09/23/22  2303 09/23/22  1523 05/05/22  0904 11/09/21  0158 11/08/21  1252 03/13/21  0550 03/12/21  1128   WBC  --   --  8.8  --  9.8 10.0   < > 11.4*   < > 14.1*   HGB 8.5* 8.6* 8.1*   < > 8.1* 12.0*   < > 11.3*   < > 11.5*   MCV  --   --  103*  --  104* 102*   < > 101*   < > 92   PLT  --   --  342  --  374 191   < > 238   < > 21*   INR  --   --   --   --  1.16*  --   --  1.13  --  1.20*    < > = values in this interval not displayed.     Recent Labs   Lab Test 09/26/22  0656 09/25/22  0854 09/24/22  0835 09/24/22  0023 09/23/22  1523 05/02/22  0850   POTASSIUM 3.5 3.6 4.3  4.3   < > 3.1* 4.1   CHLORIDE  --   --  104  --  100 101   CO2  --   --  34*  --  30 30   BUN  --   --  14  --  14 33*   ANIONGAP  --   --  4  --  9 6    < > = values in this interval not displayed.     Recent Labs   Lab Test 09/23/22  1523 05/01/22  1030 05/01/22  1006 04/15/22  1550 01/13/22  1305 11/09/21  0832   ALBUMIN 2.6*  --  2.6*  --   --  2.6*   BILITOTAL 0.5  --  0.8  --   --  0.4   ALT 16  --  13  --   --  14   AST 11  --  11  --   --  10   PROTEIN  --   20 *  --  20 * 30 *  --    LIPASE 82  --   --   --   --   --        I reviewed the patient's new imaging results.    All laboratory data reviewed  All imaging studies reviewed by me.    Ramya Evangelista PA-C,  9/26/2022  [facility]  Office : [phone]  Cell: [phone] (Dr. Davis)  Cell: [phone] (Ramya Evangelista PA-C)

## 2022-09-26 NOTE — PLAN OF CARE
A&Ox4, VSS on 3 L NC. up w/ A2/ceiling lift. PIV SL Tolerating thickening clear liquid diet. Denies pain and nausea. Pt has L sided weakness. Chronic chirinos in place w/ clear yellow/moderate output. Tele on w/ ST. Turn/repo q2 hours. On pulsate mattress. PIV SL. Will continue to monitor

## 2022-09-26 NOTE — CONSULTS
"[facility] Nurse Inpatient Assessment     Consulted for: Sacral PI     Patient History (according to provider note(s):         Ron Gilmore is a 82 year old male with history of morbid obesity, type 2 diabetes, COPD, iron deficiency anemia, chronic infection 2 L, among others who presented to the ED with complaints of abdominal discomfort and black stool.  Patient came via EMS from [facility].  Patient has had these symptoms intermittently for years but more recently have increased and his stomach feels \"stirred up\".  He reports black stool as well.  He has not had any vomiting but has had some nausea with the discomfort at times.  He does have a history of similar symptoms last year and was seen by Dr. Davis where EGD was fairly unremarkable and colon showed diverticula and polyps.  Otherwise, patient denies recent illness-no fevers, chills, cough, shortness of breath, chest pain, dysuria, or lower extremity edema.  He has been taking his medications which do include aspirin, pantoprazole, and sucralfate among others.    Areas Assessed:      Areas visualized during today's visit: Sacrum/coccyx and ears    Skin Injury Location: BL buttock    Last photo: 9/26/22        Skin injury due to: Chronic skin injury (often related to prolonged recliner use) and Incontinence associated dermatitis (IAD)  Skin history and plan of care:   Pt reports significant amt of sitting and states his bottom does get sore, but he was not aware of any open areas. Pt able to turn independently onto his side  Affected area:      Skin assessment: Blanchable purple erythema, congested tissue, few scattered scabs and couple macerated pea sized areas.     Measurements (length x width x depth, in cm) 8  x 12  x  0 cm      Color: purple     Temperature  normal      Drainage: none .      Color: none      Odor: none  Pain: denies , none  Pain interventions prior to dressing change: N/A  Treatment goal: " Protection  STATUS: initial assessment  Supplies ordered: at bedside    Pressure Injury Location: BL ears    Last photo: 9/26/22        Wound type: Pressure Injury     Pressure Injury Stage: 2, present on admission      This is a Medical Device Related Pressure Injury (MDRPI) due to oxygen tubing  Wound history/plan of care:   Tops of ears unable to get photo due to desating during assessment.     Wound base: Top of right and Left ear 100% pink dermis, L helix 90% dried crusty drainage and 10% pink dermis      Palpation of the wound bed: normal      Drainage: scant     Description of drainage: serous     Measurements (length x width x depth, in cm) Top of ears both approx 0.5 x 0.5 x 0.01 and L outer 1.5  x 0.5  x  0.01 cm     Periwound skin: Intact      Color: normal and consistent with surrounding tissue      Temperature: normal   Odor: none  Pain: mild, tender  Pain intervention prior to dressing change: slow and gentle cares   Treatment goal: Heal   STATUS: initial assessment  Supplies ordered: at bedside    Treatment Plan:     BL buttock wound(s): BID and PRN incontinence  1. After any incontinent episode cleanse with brandan cleanse and protect and shanon dry wipes/washcloths.   2. Ensure area is completely dry by blotting and using circular motions, do not wipe as this can cause trauma to the skin   3. Apply thin layer of critic aid barrier paste. Remove only soiled paste, then reapply thin layer. If complete removal is needed use baby/mineral oil (located in pharmacy).   *Avoid pre moisten wipes.   *Avoid use of brief  *Use single covidien pad and limit number of linens underneath patient. Covidien pad can be used as incontinence pad and lift pad    BL ears: Daily  1. Cleanse with wound cleanser and blot dry  2. Apply Cavilon No Sting Skin Prep (#[id number]) to periwound and allow to dry.  3. Apply 1/2 Mepilex wrapped around oxygen tubing.        Orders: Written    RECOMMEND PRIMARY TEAM ORDER: None, at this  time  Education provided: importance of repositioning, plan of care, Moisture management and Off-loading pressure  Discussed plan of care with: Patient and Nurse  WOC nurse follow-up plan: weekly  Notify WOC if wound(s) deteriorate.  Nursing to notify the Provider(s) and re-consult the WOC Nurse if new skin concern.    DATA:     Current support surface: Standard  Atmos Air mattress  Containment of urine/stool: Incontinence Protocol  BMI: Body mass index is 30.26 kg/m .   Active diet order: Orders Placed This Encounter      Clear Liquid Diet     Output: I/O last 3 completed shifts:  In: 1120 [P.O.:1120]  Out: 750 [Urine:750]     Labs: Recent Labs   Lab 09/26/22  0656 09/25/22  0556 09/24/22  0835 09/23/22  2303 09/23/22  1523   ALBUMIN  --   --   --   --  2.6*   HGB 8.5*   < > 8.1*   < > 8.1*   INR  --   --   --   --  1.16*   WBC  --   --  8.8  --  9.8    < > = values in this interval not displayed.     Pressure injury risk assessment:   Sensory Perception: 3-->slightly limited  Moisture: 3-->occasionally moist  Activity: 2-->chairfast  Mobility: 2-->very limited  Nutrition: 1-->very poor  Friction and Shear: 1-->problem  Lon Score: 12    Ron Grant RN CWOCN  Dept. Pager: [phone]  Dept. Office Number: [phone]

## 2022-09-26 NOTE — PLAN OF CARE
SLP: Orders received and chart reviewed. Patient is well known to this department due to history of dysphagia. 3/17/21. Patient has been on mildly thick liquids since his last video swallow study. Currently on a clear liquid diet and spoke with his nurse. She stated that she has been thickening his liquids. They should be mildly thick given by spoon or cup and avoid straws. Unable to see this afternoon and will reschedule the evaluation for 9/27/22.

## 2022-09-26 NOTE — PLAN OF CARE
Goal Outcome Evaluation:  Pt is A&Ox4, VSS on 2L NC. Up w/ A2 and ceiling lift. Denies pain. Cardona in place. Mepilex to sacrum, scattered bruises. Tele:NSR. Turn and Repo q2. L sided weakness. Int. cough. Tolerating thickened clear liquid diet. Incontinence care done. On pulsate mattress. PIV SL. Plan; continue to monitor. Possible discharge tomorrow.

## 2022-09-26 NOTE — PROGRESS NOTES
"[facility]    Medicine Progress Note - Hospitalist Service    Date of Admission:  9/23/2022    Assessment & Plan        Ron Gilmore is a 82 year old male with history of morbid obesity, type 2 diabetes, COPD, iron deficiency anemia, chronic infection 2 L, among others who presented to the ED with complaints of abdominal discomfort and black stool.  Patient came via EMS from [facility].  Patient has had these symptoms intermittently for years but more recently have increased and his stomach feels \"stirred up\".  He reports black stool as well.  He has not had any vomiting but has had some nausea with the discomfort at times.  He does have a history of similar symptoms last year and was seen by Dr. Davis where EGD was fairly unremarkable and colon showed diverticula and polyps.  Otherwise, patient denies recent illness-no fevers, chills, cough, shortness of breath, chest pain, dysuria, or lower extremity edema.  He has been taking his medications which do include aspirin, pantoprazole, and sucralfate among others.     Melena  Anemia, unclear chronicity, likely in part due to GI blood loss  History of iron deficiency anemia, per chart review  History of GI bleed  History of diverticulosis and colon polyps  Patient with worsening abdominal discomfort and melena which previously had been intermittent over the last few years.  Of note his hemoglobin in May was around 12 and today 8.1.  He denies dizziness or lightheadedness.  No vomiting.  He has been taking his Protonix and sucralfate.  He is on aspirin.  He did see Dr. Davis last year with unremarkable EGD and colon showing diverticula and polyps.  - Admit to inpatient  - Type and screen and consent for blood transfusion (if needed) he has been done in the ER  - Transfuse for less than 7 or active bleeding  - hgb remains stable around 8  - GI consult-Dr. Davis  - continue PPI BID  - 9/24 -EGD reportedly unremarkable without etiology " of anemia/bleeding.  GI planning towards colonoscopy tomorrow otherwise outpatient capsule endoscopy  - 9/25 - feeling fine but couldn't get thru prep last night  -- hgb stable/improved at 8.6  And now 8.5 9/26  - [facility] outpatient follow up with capsule to be arranged, plan for discharge to his home facility 9/27     Hypokalemia  Potassium 3.1 upon admission.  Likely due to decreased oral intake while feeling unwell as above.  - Replace as able  - K improving/normalized     Chronic hypoxic respiratory failure-on 2 L nasal cannula at home  Stable upon admission.  Continue supplemental oxygen.  -9/26 had some increased O2 need earlier today, seems to be nearing back to baseline later in the day.  Check BNP, chest x-ray and procalcitonin with consideration of something like aspiration pneumonia or may be fluid overload.  - will consider extra dose of lasix if indicated    Morbid obesity  Lifestyle modifications as able with PCP follow-up.     Chronic indwelling chirinos  In place.  Denies blood in urine. No new issues, states he has used chirinos for a long time. Continue.         Diet: Clear Liquid Diet  Room Service    DVT Prophylaxis: Pneumatic Compression Devices  Chirinos Catheter: PRESENT, indication: Other (Comment) (chronic)  Central Lines: None  Cardiac Monitoring: ACTIVE order. Indication: gi bleed concern  Code Status: No CPR- Do NOT Intubate      Disposition Plan     Expected Discharge Date: 09/26/2022      Destination: assisted living          The patient's care was discussed with the Bedside Nurse and Patient.    Kiet Marcos MD  Hospitalist Service  [facility]  Securely message with the Vocera Web Console (learn more here)  Text page via [employer] Paging/Directory         Clinically Significant Risk Factors Present on Admission               # Obesity: Estimated body mass index is 30.26 kg/m  as calculated from the following:    Height as of this encounter: 1.829 m (6').    Weight  as of this encounter: 101.2 kg (223 lb 1.6 oz).        ______________________________________________________________________    Interval History   Patient seen and examined.  No new complaints states feeling somewhat tired.  He did need some increased oxygen earlier today.  This seems to be improving.  No new fevers or chills.  No new pain.  Hemoglobin stable.  Likely discharge tomorrow.    Data reviewed today: I reviewed all medications, new labs and imaging results over the last 24 hours. I personally reviewed no images or EKG's today.    Physical Exam   Vital Signs: Temp: 98.8  F (37.1  C) Temp src: Oral BP: 109/73 Pulse: 99   Resp: 18 SpO2: 93 % O2 Device: Nasal cannula Oxygen Delivery: 3 LPM  Weight: 223 lbs 1.6 oz    Gen: NAD, pleasant  HEENT: EOMI, MMM  Resp: no focal crackles,  no wheezes, no increased work of resp  CV: S1S2 heard, reg rhythm, reg rate  Abdo: soft, nontender, nondistended, bowel sounds present  Ext: calves nontender, well perfused  Neuro: aa, conversant, CN grossly intact, no facial asymmetry      Data   Recent Labs   Lab 09/26/22  0656 09/25/22  0854 09/25/22  0819 09/25/22  0556 09/24/22  2235 09/24/22  0835 09/23/22  2038 09/23/22  1523   WBC  --   --   --   --   --  8.8  --  9.8   HGB 8.5*  --   --  8.6*  --  8.1*   < > 8.1*   MCV  --   --   --   --   --  103*  --  104*   PLT  --   --   --   --   --  342  --  374   INR  --   --   --   --   --   --   --  1.16*   NA  --   --   --   --   --  142  --  139   POTASSIUM 3.5 3.6  --   --   --  4.3  4.3   < > 3.1*   CHLORIDE  --   --   --   --   --  104  --  100   CO2  --   --   --   --   --  34*  --  30   BUN  --   --   --   --   --  14  --  14   CR  --   --   --   --   --  0.92  --  0.74   ANIONGAP  --   --   --   --   --  4  --  9   RAJ  --   --   --   --   --  9.0  --  8.5   GLC  --   --  134*  --  136* 139*   < > 169*   ALBUMIN  --   --   --   --   --   --   --  2.6*   PROTTOTAL  --   --   --   --   --   --   --  7.0   BILITOTAL  --   --    --   --   --   --   --  0.5   ALKPHOS  --   --   --   --   --   --   --  66   ALT  --   --   --   --   --   --   --  16   AST  --   --   --   --   --   --   --  11   LIPASE  --   --   --   --   --   --   --  82    < > = values in this interval not displayed.     Recent Results (from the past 24 hour(s))   XR Chest Port 1 View    Narrative    XR PORTABLE CHEST ONE VIEW   9/26/2022 12:40 PM     HISTORY: Hypoxia    COMPARISON: 5/1/2022.      Impression    IMPRESSION: New ill-defined opacities at the bilateral lower lungs.  Correlate with new airspace disease and/or atelectasis. Bilateral  vascular and interstitial prominence may be increased edema as well.  Hypoinflated lungs. Stable cardiac silhouette.    TODD GALICIA MD         SYSTEM ID:  [id number]

## 2022-09-27 ENCOUNTER — APPOINTMENT (OUTPATIENT)
Dept: SPEECH THERAPY | Facility: CLINIC | Age: 82
DRG: 377 | End: 2022-09-27
Attending: HOSPITALIST
Payer: MEDICARE

## 2022-09-27 LAB
ERYTHROCYTE [DISTWIDTH] IN BLOOD BY AUTOMATED COUNT: 16 % (ref 10–15)
HCT VFR BLD AUTO: 32.3 % (ref 40–53)
HGB BLD-MCNC: 9 G/DL (ref 13.3–17.7)
LACTATE SERPL-SCNC: 2.3 MMOL/L (ref 0.7–2)
LACTATE SERPL-SCNC: 2.7 MMOL/L (ref 0.7–2)
MAGNESIUM SERPL-MCNC: 2.1 MG/DL (ref 1.6–2.3)
MCH RBC QN AUTO: 28.1 PG (ref 26.5–33)
MCHC RBC AUTO-ENTMCNC: 27.9 G/DL (ref 31.5–36.5)
MCV RBC AUTO: 101 FL (ref 78–100)
PLATELET # BLD AUTO: 407 10E3/UL (ref 150–450)
POTASSIUM BLD-SCNC: 3.3 MMOL/L (ref 3.4–5.3)
POTASSIUM BLD-SCNC: 3.4 MMOL/L (ref 3.4–5.3)
POTASSIUM BLD-SCNC: 3.4 MMOL/L (ref 3.4–5.3)
PROCALCITONIN SERPL-MCNC: 0.69 NG/ML
RBC # BLD AUTO: 3.2 10E6/UL (ref 4.4–5.9)
WBC # BLD AUTO: 11.8 10E3/UL (ref 4–11)

## 2022-09-27 PROCEDURE — 250N000011 HC RX IP 250 OP 636: Performed by: PHYSICIAN ASSISTANT

## 2022-09-27 PROCEDURE — 250N000009 HC RX 250: Performed by: HOSPITALIST

## 2022-09-27 PROCEDURE — 94640 AIRWAY INHALATION TREATMENT: CPT

## 2022-09-27 PROCEDURE — 36415 COLL VENOUS BLD VENIPUNCTURE: CPT | Performed by: HOSPITALIST

## 2022-09-27 PROCEDURE — 250N000013 HC RX MED GY IP 250 OP 250 PS 637: Performed by: HOSPITALIST

## 2022-09-27 PROCEDURE — 83605 ASSAY OF LACTIC ACID: CPT | Performed by: HOSPITALIST

## 2022-09-27 PROCEDURE — 250N000011 HC RX IP 250 OP 636: Performed by: HOSPITALIST

## 2022-09-27 PROCEDURE — 99233 SBSQ HOSP IP/OBS HIGH 50: CPT | Performed by: HOSPITALIST

## 2022-09-27 PROCEDURE — 94640 AIRWAY INHALATION TREATMENT: CPT | Mod: 76

## 2022-09-27 PROCEDURE — 92610 EVALUATE SWALLOWING FUNCTION: CPT | Mod: GN | Performed by: SPEECH-LANGUAGE PATHOLOGIST

## 2022-09-27 PROCEDURE — 92526 ORAL FUNCTION THERAPY: CPT | Mod: GN | Performed by: SPEECH-LANGUAGE PATHOLOGIST

## 2022-09-27 PROCEDURE — 120N000001 HC R&B MED SURG/OB

## 2022-09-27 PROCEDURE — 85027 COMPLETE CBC AUTOMATED: CPT | Performed by: HOSPITALIST

## 2022-09-27 PROCEDURE — 84145 PROCALCITONIN (PCT): CPT | Performed by: HOSPITALIST

## 2022-09-27 PROCEDURE — 84132 ASSAY OF SERUM POTASSIUM: CPT | Performed by: HOSPITALIST

## 2022-09-27 PROCEDURE — C9113 INJ PANTOPRAZOLE SODIUM, VIA: HCPCS | Performed by: PHYSICIAN ASSISTANT

## 2022-09-27 PROCEDURE — 999N000157 HC STATISTIC RCP TIME EA 10 MIN

## 2022-09-27 PROCEDURE — 83735 ASSAY OF MAGNESIUM: CPT | Performed by: HOSPITALIST

## 2022-09-27 RX ORDER — POTASSIUM CHLORIDE 1500 MG/1
40 TABLET, EXTENDED RELEASE ORAL ONCE
Status: COMPLETED | OUTPATIENT
Start: 2022-09-27 | End: 2022-09-27

## 2022-09-27 RX ORDER — AMPICILLIN AND SULBACTAM 2; 1 G/1; G/1
3 INJECTION, POWDER, FOR SOLUTION INTRAMUSCULAR; INTRAVENOUS EVERY 6 HOURS
Status: DISCONTINUED | OUTPATIENT
Start: 2022-09-27 | End: 2022-09-28 | Stop reason: HOSPADM

## 2022-09-27 RX ORDER — POTASSIUM CHLORIDE 1.5 G/1.58G
40 POWDER, FOR SOLUTION ORAL ONCE
Status: DISCONTINUED | OUTPATIENT
Start: 2022-09-27 | End: 2022-09-27

## 2022-09-27 RX ADMIN — PANTOPRAZOLE SODIUM 40 MG: 40 INJECTION, POWDER, FOR SOLUTION INTRAVENOUS at 09:02

## 2022-09-27 RX ADMIN — PAROXETINE HYDROCHLORIDE 10 MG: 10 TABLET, FILM COATED ORAL at 09:03

## 2022-09-27 RX ADMIN — FUROSEMIDE 40 MG: 40 TABLET ORAL at 09:02

## 2022-09-27 RX ADMIN — METOPROLOL TARTRATE 12.5 MG: 25 TABLET, FILM COATED ORAL at 09:01

## 2022-09-27 RX ADMIN — IPRATROPIUM BROMIDE AND ALBUTEROL SULFATE 3 ML: .5; 3 SOLUTION RESPIRATORY (INHALATION) at 19:26

## 2022-09-27 RX ADMIN — METOPROLOL TARTRATE 12.5 MG: 25 TABLET, FILM COATED ORAL at 20:20

## 2022-09-27 RX ADMIN — POTASSIUM CHLORIDE 40 MEQ: 1500 TABLET, EXTENDED RELEASE ORAL at 10:43

## 2022-09-27 RX ADMIN — IPRATROPIUM BROMIDE AND ALBUTEROL SULFATE 3 ML: .5; 3 SOLUTION RESPIRATORY (INHALATION) at 07:51

## 2022-09-27 RX ADMIN — AMPICILLIN SODIUM AND SULBACTAM SODIUM 3 G: 2; 1 INJECTION, POWDER, FOR SOLUTION INTRAMUSCULAR; INTRAVENOUS at 20:20

## 2022-09-27 RX ADMIN — Medication 2 DROP: at 09:02

## 2022-09-27 RX ADMIN — Medication 2 DROP: at 20:20

## 2022-09-27 RX ADMIN — METHENAMINE HIPPURATE 1 G: 1 TABLET ORAL at 09:04

## 2022-09-27 RX ADMIN — METHENAMINE HIPPURATE 1 G: 1 TABLET ORAL at 22:06

## 2022-09-27 RX ADMIN — UMECLIDINIUM BROMIDE AND VILANTEROL TRIFENATATE 1 PUFF: 62.5; 25 POWDER RESPIRATORY (INHALATION) at 09:04

## 2022-09-27 RX ADMIN — AMPICILLIN SODIUM AND SULBACTAM SODIUM 3 G: 2; 1 INJECTION, POWDER, FOR SOLUTION INTRAMUSCULAR; INTRAVENOUS at 13:24

## 2022-09-27 RX ADMIN — PAROXETINE 40 MG: 40 TABLET, FILM COATED ORAL at 09:04

## 2022-09-27 ASSESSMENT — ACTIVITIES OF DAILY LIVING (ADL)
ADLS_ACUITY_SCORE: 46
ADLS_ACUITY_SCORE: 46
ADLS_ACUITY_SCORE: 44
ADLS_ACUITY_SCORE: 46
ADLS_ACUITY_SCORE: 46
ADLS_ACUITY_SCORE: 44
ADLS_ACUITY_SCORE: 46
ADLS_ACUITY_SCORE: 44
ADLS_ACUITY_SCORE: 46
ADLS_ACUITY_SCORE: 46

## 2022-09-27 NOTE — PROGRESS NOTES
"   09/27/22 1412   General Information   Onset of Illness/Injury or Date of Surgery 09/23/22   Referring Physician Kiet Marcos MD   Patient/Family Therapy Goal Statement (SLP) return to [facility]   Pertinent History of Current Problem \"Ron Gilmore is a 82 year old male with history of morbid obesity, type 2 diabetes, COPD, iron deficiency anemia, chronic infection 2 L, among others who presented to the ED with complaints of abdominal discomfort and black stool.\" Chronic dysphagia since CVA. Last VFSS was 3/17/21. Pt followed with SLP since 2018.   General Observations Pt reported that he took some sips of thin water when his diet orders were clear liquid/thin liquids. He independently verbalized risk of aspiration and pneumonia. Pt has an excellent recall of rationale for mildly thick liquids and reported plan to continue this modification as he has done since 2018.   Past History of Dysphagia see PMHX; currently follows a regular diet with mildly thick liquids and FWP for ice chips in between meals   Type of Evaluation   Type of Evaluation Swallow Evaluation   Dentition (Oral Motor)   Dentition (Oral Motor) edentulous   Facial Symmetry (Oral Motor)   Facial Symmetry (Oral Motor) left side impairment   Vocal Quality/Secretion Management (Oral Motor)   Vocal Quality (Oral Motor) hoarse   General Swallowing Observations   Respiratory Support (General Swallowing Observations) none   Current Diet/Method of Nutritional Intake (General Swallowing Observations, NIS) clear liquid diet;mildly thick liquids (level 2)   Swallowing Evaluation Clinical swallow evaluation   Clinical Swallow Evaluation   Feeding Assistance no assistance needed   Swallowing Recommendations   Diet Consistency Recommendations regular diet;mildly thick liquids (level 2)   Supervision Level for Intake patient independent   Mode of Delivery Recommendations bolus size, small;slow rate of intake;place food on right side of mouth   Swallowing " Maneuver Recommendations alternate food and liquid intake;double dry swallow;effortful (hard) swallow   Monitoring/Assistance Required (Eating/Swallowing) check mouth frequently for oral residue/pocketing;cue for finger/lingual sweep if oral pocketing present;stop eating activities when fatigue is present;monitor for cough or change in vocal quality with intake   Recommended Feeding/Eating Techniques (Swallow Eval) maintain upright sitting position for eating;maintain upright posture during/after eating for 30 minutes;minimize distractions during oral intake;moisten oral mucosa prior to intake   General Therapy Interventions   Planned Therapy Interventions Dysphagia Treatment   Dysphagia treatment Modified diet education;Instruction of safe swallow strategies;Compensatory strategies for swallowing   Clinical Impression   Criteria for Skilled Therapeutic Interventions Met (SLP Eval) Yes, treatment indicated   SLP Diagnosis Dysphagia   Risks & Benefits of therapy have been explained evaluation/treatment results reviewed;care plan/treatment goals reviewed;risks/benefits reviewed;current/potential barriers reviewed;participants voiced agreement with care plan;participants included;patient   Clinical Impression Comments SLP: Pt verbalized agreement that he appears to be at his baseline swallow function with chronic dysphagia s/p CVA . Hx of recurrent aspiration pneumonia. Pt alert and has an excellent recall of years of SLP services and previous evaluations with this SLP. Pt is edentulous, often talks with oral bolus and eats in a reclined position that increases his risk of aspiration. Pt self feeding ice chips as SLP arrived. Ice chips, mildly thick liquids and crackers trialed. Mild oral deficits noted. No overt s/sx of aspiration. Unable to rule out silent aspiration at bedside. Known hx of silent aspiration. Reviewed current status, history, and further work up. Pt would like to return to baseline diet: Regular diet  (selection of softer/moist foods), mildly thick liquids by spoon with meals. In between meals, provide thorough oral cares and okay for ice chips. No further SLP services at this time.   SLP Discharge Planning   SLP Discharge Recommendation Long term care facility   SLP Rationale for DC Rec Pt at his baseline swallow function. No further SLP services at this time.   SLP Brief overview of current status  Regular diet (selection of softer/moist foods), mildly thick liquids by spoon with meals. In between meals, provide thorough oral cares and okay for ice chips.    Total Evaluation Time   Total Evaluation Time (Minutes) 20   SLP Goals   Therapy Frequency (SLP Eval) one time eval and treatment only   Psychosocial Support   Trust Relationship/Rapport care explained;choices provided;questions answered;questions encouraged;reassurance provided;thoughts/feelings acknowledged

## 2022-09-27 NOTE — PROGRESS NOTES
Pt. A&Ox4. VSS on 2L O2 NC. Turn/repo, check and change. PIV, SL.  Cardona with good out. Denies pain.

## 2022-09-27 NOTE — PROGRESS NOTES
Care Management Follow Up    Length of Stay (days): 4    Expected Discharge Date: 09/27/2022     Concerns to be Addressed: discharge planning     Patient plan of care discussed at interdisciplinary rounds: Yes    Anticipated Discharge Disposition:  [facility]     Anticipated Discharge Services:  Home Care RN  Anticipated Discharge DME:  oxygen    Patient/family educated on Medicare website which has current facility and service quality ratings:    Education Provided on the Discharge Plan:    Patient/Family in Agreement with the Plan:  yes    Referrals Placed by CM/SW:    Private pay costs discussed:     Additional Information:  Orders faxed to [facility] at [phone]. Voice mail message left for SOFIA Iyer at [facility] regarding current ride time of 1300. Also updated in message inquiring resident could arrive a little later as MD is reassessing labs stat but if all ok patient would discharge.     Await call back from Shireen and await assessment of labs.     1130 call back received from Shireen. Appreciates the update. Ok to return later than 1300 if needed pending the labs. She will review the orders that are sent and make a note for staff. If ride time changes, writer will call back to update.    1212 MD paged regarding new lab results starting to come in. Ride changed to 1500, confirmed with Saritha at [facility] at phone [phone].    1251 updated by bedside RN that discharge is moved to possibly tomorrow. New orders from MD today for IV antibiotics and reevaluate tomorrow. Writer called Shireen BLACK at [facility] phone [phone] and updated with new discharge day of tomorrow with ride time at 1300 on 9/28/2022.   Writer called [facility] at [phone] and spoke to Saira White ride rescheduled for tomorrow, Wednesday, September 28th at 1300.    Sandy Bone RN   [facility]   Phone [phone]

## 2022-09-27 NOTE — PLAN OF CARE
Goal Outcome Evaluation:    Pt A&OX4. VSS on 2L O2, baseline. Turn and repositioning frequently with A2, lift. L sided hemiparesis. Sacrum mepilex applied per WOC order. Bilateral ears wounds care performed as well. Oxygen tubing wrap with foam. Cough with thin liquid, speech therapist consult order. Continue to monitor.

## 2022-09-27 NOTE — PLAN OF CARE
Goal Outcome Evaluation:    Plan of Care Reviewed With: patient     A&OX4, VSS, denies pain.  Repo q 2 hours.  Sacral/buttox area reddened, treated per POC with barrier cream.  Pulsate mattress.  Pt left sided hemiparesis.  Cardona patent with clear yellow urine. Left ear scab wound care per POC. Foam dressing wrapped around NC to protect ear from further injury. Tolerating diet with thickened liquids, however not much of an appetite.

## 2022-09-27 NOTE — PROGRESS NOTES
[facility]    Medicine Progress Note - Hospitalist Service    Date of Admission:  9/23/2022    Assessment & Plan        Ron Gilmore is a 82 year old male with history of morbid obesity, type 2 diabetes, COPD, iron deficiency anemia, chronic infection 2 L, among others who presented to the ED with complaints of abdominal discomfort and black stool. EGD unremarkable, could not complete colon prep. Susan will have capsule endoscopy from clinic after discharge. Concern of early aspiration pna is being treated as of 9/27 and he can likely discharge tmrw.      Possible aspiration pneumonia  As below, some increased O2 and coughing with food/drink. Lactic acid was checked at 9/27 from possibly a delayed alert yesterday - at any rate - was elevated at 2.7 prompting cbc, procalcitonin and LA recheck. LA improved to 2.3 on its own - will avoid further IVF. He is feeling well, afebrile, nontoxic, and on baseline O2 need.  Procalc has increased to 0.69 and now he has leukocytosis of 11.8. Combined with possible infiltrate on cxr yesterday.   ---> start IV unasyn   - likely can discharge tmrw afternoon    Chronic hypoxic respiratory failure-on 2 L nasal cannula at home  Stable upon admission.  Continue supplemental oxygen.  -9/26 had some increased O2 need earlier today, seems to be nearing back to baseline later in the day.  Check BNP, chest x-ray and procalcitonin with consideration of something like aspiration pneumonia or may be fluid overload. BNP normal.  - given 1x dose iv lasix 40    Melena (original reason for admission)  Anemia, unclear chronicity, likely in part due to GI blood loss  History of iron deficiency anemia, per chart review  History of GI bleed  History of diverticulosis and colon polyps  Patient with worsening abdominal discomfort and melena which previously had been intermittent over the last few years.  Of note his hemoglobin in May was around 12 and today 8.1.  He denies  dizziness or lightheadedness.  No vomiting.  He has been taking his Protonix and sucralfate.  He is on aspirin.  He did see Dr. Davis last year with unremarkable EGD and colon showing diverticula and polyps.  - Type and screen and consent for blood transfusion (if needed) he has been done in the ER  - Transfuse for less than 7 or active bleeding  - hgb remains stable around 8 --> hgb 8.5 --> 9   - GI consult - Dr. Davis  - continue PPI BID  - 9/24 -EGD reportedly unremarkable without etiology of anemia/bleeding.  GI planning towards colonoscopy tomorrow otherwise outpatient capsule endoscopy  - 9/25 - feeling fine but couldn't get thru prep last night  - [facility] outpatient follow up with capsule to be arranged, plan for discharge to his home facility 9/27     Hypokalemia  Potassium 3.1 upon admission.  Likely due to decreased oral intake while feeling unwell as above.  - Replace as able  - K improving/normalized     Morbid obesity  Lifestyle modifications as able with PCP follow-up.     Chronic indwelling chirinos  In place.  Denies blood in urine. No new issues, states he has used chirinos for a long time. Continue.         Diet: Room Service  Diet  Regular Diet Adult Mildly Thick (level 2)    DVT Prophylaxis: Pneumatic Compression Devices  Chirinos Catheter: PRESENT, indication:  (chronic)  Central Lines: None  Cardiac Monitoring: None  Code Status: No CPR- Do NOT Intubate      Disposition Plan      Expected Discharge Date: 09/27/2022,  3:00 PM    Destination: assisted living          The patient's care was discussed with the Bedside Nurse, Care Coordinator/ and Patient.    Kiet Marcos MD  Hospitalist Service  [facility]  Securely message with the Vocera Web Console (learn more here)  Text page via [employer] Paging/Directory         Clinically Significant Risk Factors Present on Admission               # Obesity: Estimated body mass index is 30.26 kg/m  as calculated from the  following:    Height as of this encounter: 1.829 m (6').    Weight as of this encounter: 101.2 kg (223 lb 1.6 oz).        Total encounter time 38 min with >50% spent in discussion and counseling with patient regarding lab and imaging results as well as planning abx for pna and coordination of care with RN, CC/SW, and staff.  ______________________________________________________________________    Interval History   Patient seen and examined late morning and revisited early afternoon (see first section above).  He reports feeling much better - no fevers, no pain, breathing back to normal with baseline 2L NC.  Plan was to go home today, but will delay one day to give IV antibiotics for aspiration pneumonia.     Data reviewed today: I reviewed all medications, new labs and imaging results over the last 24 hours. I personally reviewed no images or EKG's today.    Physical Exam   Vital Signs: Temp: 97.8  F (36.6  C) Temp src: Oral BP: 117/64 Pulse: 92   Resp: 18 SpO2: 96 % O2 Device: Nasal cannula Oxygen Delivery: 2 LPM  Weight: 223 lbs 1.6 oz    Gen: NAD, extremely pleasant  HEENT: EOMI, MMM  Resp: no focal crackles,  no wheezes, no increased work of resp  CV: S1S2 heard, reg rhythm, reg rate  Abdo: soft, nontender, nondistended, bowel sounds present  Ext: calves nontender, well perfused  Neuro: aa, conversant appropritaely, CN grossly intact, no facial asymmetry      Data   Recent Labs   Lab 09/27/22  1103 09/27/22  0631 09/26/22  0656 09/25/22  0854 09/25/22  0819 09/25/22  0556 09/24/22  2235 09/24/22  0835 09/23/22  2038 09/23/22  1523   WBC 11.8*  --   --   --   --   --   --  8.8  --  9.8   HGB 9.0*  --  8.5*  --   --  8.6*  --  8.1*   < > 8.1*   *  --   --   --   --   --   --  103*  --  104*     --   --   --   --   --   --  342  --  374   INR  --   --   --   --   --   --   --   --   --  1.16*   NA  --   --   --   --   --   --   --  142  --  139   POTASSIUM 3.3*  3.4 3.4 3.5   < >  --   --   --  4.3   4.3   < > 3.1*   CHLORIDE  --   --   --   --   --   --   --  104  --  100   CO2  --   --   --   --   --   --   --  34*  --  30   BUN  --   --   --   --   --   --   --  14  --  14   CR  --   --   --   --   --   --   --  0.92  --  0.74   ANIONGAP  --   --   --   --   --   --   --  4  --  9   RAJ  --   --   --   --   --   --   --  9.0  --  8.5   GLC  --   --   --   --  134*  --  136* 139*   < > 169*   ALBUMIN  --   --   --   --   --   --   --   --   --  2.6*   PROTTOTAL  --   --   --   --   --   --   --   --   --  7.0   BILITOTAL  --   --   --   --   --   --   --   --   --  0.5   ALKPHOS  --   --   --   --   --   --   --   --   --  66   ALT  --   --   --   --   --   --   --   --   --  16   AST  --   --   --   --   --   --   --   --   --  11   LIPASE  --   --   --   --   --   --   --   --   --  82    < > = values in this interval not displayed.     No results found for this or any previous visit (from the past 24 hour(s)).

## 2022-09-28 VITALS
RESPIRATION RATE: 18 BRPM | DIASTOLIC BLOOD PRESSURE: 62 MMHG | HEART RATE: 81 BPM | WEIGHT: 214.6 LBS | SYSTOLIC BLOOD PRESSURE: 125 MMHG | OXYGEN SATURATION: 96 % | TEMPERATURE: 98.5 F | HEIGHT: 72 IN | BODY MASS INDEX: 29.07 KG/M2

## 2022-09-28 PROBLEM — J69.0: Status: ACTIVE | Noted: 2022-09-28

## 2022-09-28 LAB — MAGNESIUM SERPL-MCNC: 2.2 MG/DL (ref 1.6–2.3)

## 2022-09-28 PROCEDURE — 99239 HOSP IP/OBS DSCHRG MGMT >30: CPT | Performed by: HOSPITALIST

## 2022-09-28 PROCEDURE — 94640 AIRWAY INHALATION TREATMENT: CPT

## 2022-09-28 PROCEDURE — 250N000009 HC RX 250: Performed by: HOSPITALIST

## 2022-09-28 PROCEDURE — 250N000011 HC RX IP 250 OP 636: Performed by: HOSPITALIST

## 2022-09-28 PROCEDURE — 999N000157 HC STATISTIC RCP TIME EA 10 MIN

## 2022-09-28 PROCEDURE — 250N000013 HC RX MED GY IP 250 OP 250 PS 637: Performed by: HOSPITALIST

## 2022-09-28 PROCEDURE — 250N000011 HC RX IP 250 OP 636: Performed by: PHYSICIAN ASSISTANT

## 2022-09-28 PROCEDURE — 83735 ASSAY OF MAGNESIUM: CPT | Performed by: HOSPITALIST

## 2022-09-28 PROCEDURE — C9113 INJ PANTOPRAZOLE SODIUM, VIA: HCPCS | Performed by: PHYSICIAN ASSISTANT

## 2022-09-28 PROCEDURE — 36415 COLL VENOUS BLD VENIPUNCTURE: CPT | Performed by: HOSPITALIST

## 2022-09-28 RX ORDER — PANTOPRAZOLE SODIUM 40 MG/1
40 TABLET, DELAYED RELEASE ORAL
Status: DISCONTINUED | OUTPATIENT
Start: 2022-09-29 | End: 2022-09-28 | Stop reason: HOSPADM

## 2022-09-28 RX ADMIN — AMPICILLIN SODIUM AND SULBACTAM SODIUM 3 G: 2; 1 INJECTION, POWDER, FOR SOLUTION INTRAMUSCULAR; INTRAVENOUS at 02:35

## 2022-09-28 RX ADMIN — UMECLIDINIUM BROMIDE AND VILANTEROL TRIFENATATE 1 PUFF: 62.5; 25 POWDER RESPIRATORY (INHALATION) at 09:55

## 2022-09-28 RX ADMIN — AMPICILLIN SODIUM AND SULBACTAM SODIUM 3 G: 2; 1 INJECTION, POWDER, FOR SOLUTION INTRAMUSCULAR; INTRAVENOUS at 09:55

## 2022-09-28 RX ADMIN — Medication 2 DROP: at 09:54

## 2022-09-28 RX ADMIN — PAROXETINE HYDROCHLORIDE 10 MG: 10 TABLET, FILM COATED ORAL at 09:54

## 2022-09-28 RX ADMIN — PAROXETINE 40 MG: 40 TABLET, FILM COATED ORAL at 09:55

## 2022-09-28 RX ADMIN — IPRATROPIUM BROMIDE AND ALBUTEROL SULFATE 3 ML: .5; 3 SOLUTION RESPIRATORY (INHALATION) at 07:59

## 2022-09-28 RX ADMIN — METOPROLOL TARTRATE 12.5 MG: 25 TABLET, FILM COATED ORAL at 09:54

## 2022-09-28 RX ADMIN — PANTOPRAZOLE SODIUM 40 MG: 40 INJECTION, POWDER, FOR SOLUTION INTRAVENOUS at 06:41

## 2022-09-28 RX ADMIN — METHENAMINE HIPPURATE 1 G: 1 TABLET ORAL at 09:54

## 2022-09-28 RX ADMIN — FUROSEMIDE 40 MG: 40 TABLET ORAL at 09:54

## 2022-09-28 ASSESSMENT — ACTIVITIES OF DAILY LIVING (ADL)
ADLS_ACUITY_SCORE: 46

## 2022-09-28 NOTE — DISCHARGE SUMMARY
[facility]  Hospitalist Discharge Summary      Date of Admission:  9/23/2022  Date of Discharge:  9/28/2022  Discharging Provider: Kiet Marcos MD  Discharge Service: Hospitalist Service    Discharge Diagnoses   Melena (resolved) - unclear etiology  Blood loss anemia, likely due to GI bleeding - unclear chronicity  Probable aspiration pneumonia  Chronic hypoxic respiratory failure - on 2L home O2  Hypokalemia- resolved  History of NICOLE  History of GI bleed  History of diverticulosis and colon polyps  Morbid obesity  Non-ambulatory  Chronic indwelling chirinos        Follow-ups Needed After Discharge   Follow-up Appointments     Follow-up and recommended labs and tests       Follow up with [facility] GI clinic  Follow up with PCP with BMP and CBC in 1 week             Unresulted Labs Ordered in the Past 30 Days of this Admission     No orders found from 8/24/2022 to 9/24/2022.      These results will be followed up by NA    Discharge Disposition    Discharged to assisted living / Longterm care  Condition at discharge: Stable      Hospital Course   Ron Gilmore is a 82 year old male with history of morbid obesity, type 2 diabetes, COPD, iron deficiency anemia, chronic infection 2 L, among others who presented to the ED with complaints of abdominal discomfort and black stool. EGD unremarkable, could not complete colon prep. [facility] will have capsule endoscopy from clinic after discharge. Concern of early aspiration pna is being treated as of 9/27 and he can likely discharge tmrw.      Possible aspiration pneumonia  As below, some increased O2 and coughing with food/drink. Lactic acid was checked at 9/27 from possibly a delayed alert yesterday - at any rate - was elevated at 2.7 prompting cbc, procalcitonin and LA recheck. LA improved to 2.3 on its own - will avoid further IVF. He is feeling well, afebrile, nontoxic, and on baseline O2 need.  Procalc has increased to 0.69 and now he has  leukocytosis of 11.8. Combined with possible infiltrate on cxr yesterday.   ---> start IV unasyn - given for 1 day in hospital, doing well, afebrile, on baseline O2, nontoxic and in agreement with plan to discharge to [facility] living arrangement with assistance. Augmentin bid for 6 days. PCP follow up.      Chronic hypoxic respiratory failure-on 2 L nasal cannula at home  Stable upon admission.  Continue supplemental oxygen.  -9/26 had some increased O2 need earlier today, seems to be nearing back to baseline later in the day.  Check BNP, chest x-ray and procalcitonin with consideration of something like aspiration pneumonia or may be fluid overload. BNP normal.  - given 1x dose iv lasix 40  - promptly returned to baseline oxygen need     Melena (original reason for admission)  Anemia, unclear chronicity, likely in part due to GI blood loss  History of iron deficiency anemia, per chart review  History of GI bleed  History of diverticulosis and colon polyps  Patient with worsening abdominal discomfort and melena which previously had been intermittent over the last few years.  Of note his hemoglobin in May was around 12 and today 8.1.  He denies dizziness or lightheadedness.  No vomiting.  He has been taking his Protonix and sucralfate.  He is on aspirin.  He did see Dr. Davis last year with unremarkable EGD and colon showing diverticula and polyps.  - Type and screen and consent for blood transfusion (if needed) he has been done in the ER  - Transfuse for less than 7 or active bleeding  - hgb remains stable around 8 --> hgb 8.5 --> 9   - GI consult - Dr. Davis  - continue PPI BID  - 9/24 -EGD reportedly unremarkable without etiology of anemia/bleeding.  GI planning towards colonoscopy tomorrow otherwise outpatient capsule endoscopy  - 9/25 - feeling fine but couldn't get thru prep last night  - [facility] outpatient follow up with capsule to be arranged, plan for discharge to his home facility  9/27     Hypokalemia  Potassium 3.1 upon admission.  Likely due to decreased oral intake while feeling unwell as above.  - Replace as able  - K improving/normalized     Morbid obesity  Non-ambulatory  Lifestyle modifications as able with PCP follow-up.     Chronic indwelling chirinos  In place.  Denies blood in urine. No new issues, states he s used chirinos for a long time. Continue.                Consultations This Hospital Stay   GASTROENTEROLOGY IP CONSULT  WOUND OSTOMY CONTINENCE NURSE  IP CONSULT  CARE MANAGEMENT / SOCIAL WORK IP CONSULT  SPEECH LANGUAGE PATH ADULT IP CONSULT    Code Status   No CPR- Do NOT Intubate    Time Spent on this Encounter   I, Kiet Marcos MD, personally saw the patient today and spent greater than 30 minutes discharging this patient.       Kiet Marcos MD  [address] [address]  Phone: [phone]  Fax: [phone]  ______________________________________________________________________    Physical Exam   Vital Signs: Temp: 98.5  F (36.9  C) Temp src: Oral BP: 125/62 Pulse: 81   Resp: 18 SpO2: 96 % O2 Device: Nasal cannula Oxygen Delivery: 2 LPM  Weight: 214 lbs 9.6 oz    Gen: NAD, extremely pleasant  HEENT: EOMI, MMM  Resp: no focal crackles,  no wheezes, no increased work of resp  CV: S1S2 heard, reg rhythm, reg rate  Abdo: soft, nontender, nondistended, bowel sounds present  Ext: calves nontender, well perfused  Neuro: aa, conversant appropriately, CN grossly intact, no facial asymmetry         Primary Care Physician   Kalen Metcalf    Discharge Orders      Reason for your hospital stay    Melena     Follow-up and recommended labs and tests     Follow up with [facility] GI clinic  Follow up with PCP with BMP and CBC in 1 week     Activity    Your activity upon discharge: activity as tolerated     Resume Home Care Services     Discharge Instructions    Continue home oxygen use. Continue another 6 days of  augmentin (antibiotic for likely pneumonia).  Follow up with [facility] GI clinic.   Follow up with your regular doctor for hospitalization follow up with basic labs BMP and CBC in 1 week or so.     Diet    Follow this diet upon discharge: Orders Placed This Encounter      Room Service      Clear Liquid Diet Mildly Thick (level 2)  - resume prior to admission diet upon discharge please       Significant Results and Procedures   Most Recent 3 CBC's:Recent Labs   Lab Test 09/27/22  1103 09/26/22  0656 09/25/22  0556 09/24/22  0835 09/23/22  2303 09/23/22  1523   WBC 11.8*  --   --  8.8  --  9.8   HGB 9.0* 8.5* 8.6* 8.1*   < > 8.1*   *  --   --  103*  --  104*     --   --  342  --  374    < > = values in this interval not displayed.     Procalcitonin 9/26 0.19  Procalcitonin 9/27 0.69  Most Recent 3 BMP's:Recent Labs   Lab Test 09/27/22  1103 09/27/22  0631 09/26/22  0656 09/25/22  0854 09/25/22  0819 09/24/22  2235 09/24/22  0835 09/23/22  2038 09/23/22  1523 05/05/22  0904 05/02/22  0850   NA  --   --   --   --   --   --  142  --  139  --  137   POTASSIUM 3.3*  3.4 3.4 3.5   < >  --   --  4.3  4.3   < > 3.1*  --  4.1   CHLORIDE  --   --   --   --   --   --  104  --  100  --  101   CO2  --   --   --   --   --   --  34*  --  30  --  30   BUN  --   --   --   --   --   --  14  --  14  --  33*   CR  --   --   --   --   --   --  0.92  --  0.74 1.13 1.15   ANIONGAP  --   --   --   --   --   --  4  --  9  --  6   RAJ  --   --   --   --   --   --  9.0  --  8.5  --  9.6   GLC  --   --   --   --  134* 136* 139*   < > 169*  --  187*    < > = values in this interval not displayed.     Most Recent 3 Troponin's:Recent Labs   Lab Test 05/18/21  1938 05/10/21  2110 05/01/21  2152   TROPI <0.015 <0.015 <0.015     Most Recent 3 BNP's:Recent Labs   Lab Test 09/26/22  0656 05/05/22  0904 05/01/22  1006   NTBNPI [phone]   ,   Results for orders placed or performed during the hospital encounter of 09/23/22   XR Chest Port 1  View    Narrative    XR PORTABLE CHEST ONE VIEW   9/26/2022 12:40 PM     HISTORY: Hypoxia    COMPARISON: 5/1/2022.      Impression    IMPRESSION: New ill-defined opacities at the bilateral lower lungs.  Correlate with new airspace disease and/or atelectasis. Bilateral  vascular and interstitial prominence may be increased edema as well.  Hypoinflated lungs. Stable cardiac silhouette.    TODD GALICIA MD         SYSTEM ID:  [id number]       Discharge Medications   Current Discharge Medication List      START taking these medications    Details   amoxicillin-clavulanate (AUGMENTIN) 875-125 MG tablet Take 1 tablet by mouth 2 times daily for 6 days  Qty: 12 tablet, Refills: 0    Associated Diagnoses: Aspiration pneumonia of both lower lobes due to regurgitated food (H)         CONTINUE these medications which have NOT CHANGED    Details   acetaminophen (TYLENOL) 325 MG tablet Take 650 mg by mouth every 4 hours as needed for mild pain as needed for pain/fever Max dose 3000mg/24hr      allopurinol (ZYLOPRIM) 300 MG tablet Take 300 mg by mouth daily      ammonium lactate (LAC-HYDRIN) 12 % external lotion Apply topically daily Apply thin film to bilateral feet and legs      aspirin (ASA) 81 MG EC tablet Take 1 tablet (81 mg) by mouth daily  Qty:      Associated Diagnoses: Acute and chronic respiratory failure with hypoxia (H)      atorvastatin (LIPITOR) 10 MG tablet Take 10 mg by mouth daily      cyanocobalamin (VITAMIN B-12) 500 MCG SUBL sublingual tablet Place 1 tablet (500 mcg) under the tongue daily  Qty:      Associated Diagnoses: Vitamin B12 deficiency (non anemic)      Emollient (AMLACTIN ULTRA EX) Apply topically as needed TO FEET      ferrous sulfate (FEROSUL) 325 (65 Fe) MG tablet Take 325 mg by mouth 2 times daily      furosemide (LASIX) 40 MG tablet Take 1 tablet (40 mg) by mouth daily    Associated Diagnoses: Acute diastolic congestive heart failure (H)      hypromellose (ARTIFICIAL TEARS) 0.5 % SOLN ophthalmic  solution 2 drops 2 times daily Into the affected eye (eye irritation) in addition to 2 drops twice daily PRN      ipratropium - albuterol 0.5 mg/2.5 mg/3 mL (DUONEB) 0.5-2.5 (3) MG/3ML neb solution Take 1 vial by nebulization 2 times daily In addition to 1 vial twice a day PRN      ipratropium-albuterol (COMBIVENT RESPIMAT)  MCG/ACT inhaler Inhale 1 puff into the lungs 4 times daily 0900, 1200 ,1600 ,2000      methenamine hippurate (HIPREX) 1 g tablet Take 1 g by mouth 2 times daily      metoprolol tartrate (LOPRESSOR) 25 MG tablet Take 0.5 tablets (12.5 mg) by mouth 2 times daily    Associated Diagnoses: Cerebrovascular accident (CVA), unspecified mechanism (H)      pantoprazole (PROTONIX) 40 MG EC tablet Take 1 tablet (40 mg) by mouth 2 times daily (before meals)  Qty:      Associated Diagnoses: Acute and chronic respiratory failure with hypoxia (H)      !! PARoxetine (PAXIL) 10 MG tablet Take 10 mg by mouth every morning Take with 40mg tablet to equal 50mg total      !! PARoxetine (PAXIL) 40 MG tablet Take 40 mg by mouth every morning Take with 10mg tablet to equal 50mg total      polyethylene glycol (MIRALAX) 17 GM/Dose powder Take 17 g by mouth daily  Qty: 510 g    Associated Diagnoses: Constipation, unspecified constipation type      simethicone (MYLICON) 125 MG chewable tablet Take 125 mg by mouth 3 times daily as needed for intestinal gas      Skin Protectants, Misc. (CALAZIME SKIN PROTECTANT) PSTE Externally apply topically 2 times daily Apply to buttocks topically for Excoriation to buttocks/unstageable Apply thin layer to buttocks  Also taking Apply to buttocks topically as needed for excoriation to butocks unstageable with incontinence episode       !! - Potential duplicate medications found. Please discuss with provider.      STOP taking these medications       sucralfate (CARAFATE) 1 GM tablet Comments:   Reason for Stopping:             Allergies   No Known Allergies

## 2022-09-28 NOTE — PLAN OF CARE
Goal Outcome Evaluation:    Pt is A&O x4, VSS on 2L NC, turn & repo Q 2hrs, left sided hemiparesis, Ax2 with lift, tolerating regular diet with mildly thick liquids, chirinos is intact with adequate output, denies pain, no BM this shift, discharge pending.

## 2022-09-28 NOTE — PLAN OF CARE
Goal Outcome Evaluation:    Plan of Care Reviewed With: patient     Patient discharged at 1:47 PM to back to [facility]. IV was discontinued.VSS, on RA. Denies pain. Cardona patent with adequate clear yellow OP. Wound cares done per orders, mepilex CDI. Turn/repo Q2H. Pt with poor appetite, denies N/V. BS active, passing flatus, no BM this shift. Pt verbalized understanding and all questions were answered.  Discharge meds and packet given with EMS transport.  At time of discharge, patient condition was stable and left the unit on stretcher escorted by MICHAEL.

## 2022-09-28 NOTE — PROGRESS NOTES
Care Management Discharge Note    Discharge Date: 09/28/2022       Discharge Disposition: Home    Discharge Services:      Discharge DME:      Discharge Transportation: agency    Private pay costs discussed: Transportation    PAS Confirmation Code:    Patient/family educated on Medicare website which has current facility and service quality ratings:      Education Provided on the Discharge Plan:  yes  Persons Notified of Discharge Plans: RN, Patient and left VM message for SOFIA Iyer at [facility]  Patient/Family in Agreement with the Plan:  Yes, patient notified his spouse this morning of his return  Handoff Referral Completed: Yes    Additional Information:  Message left at receiving facility regarding orders faxed and ride time of 1300. Message left with Shireen.     Patient to discharge today via stretcher ambulance at 1300 back to his assisted living.     Sandy Bone RN   [facility]   Phone [phone]

## 2022-10-05 ENCOUNTER — DOCUMENTATION ONLY (OUTPATIENT)
Dept: OTHER | Facility: CLINIC | Age: 82
End: 2022-10-05

## 2022-10-12 ENCOUNTER — MEDICAL CORRESPONDENCE (OUTPATIENT)
Dept: HEALTH INFORMATION MANAGEMENT | Facility: CLINIC | Age: 82
End: 2022-10-12

## 2023-01-19 ENCOUNTER — APPOINTMENT (OUTPATIENT)
Dept: CT IMAGING | Facility: CLINIC | Age: 83
End: 2023-01-19
Attending: EMERGENCY MEDICINE
Payer: MEDICARE

## 2023-01-19 ENCOUNTER — HOSPITAL ENCOUNTER (EMERGENCY)
Facility: CLINIC | Age: 83
Discharge: HOME | End: 2023-01-19
Attending: EMERGENCY MEDICINE | Admitting: EMERGENCY MEDICINE
Payer: MEDICARE

## 2023-01-19 VITALS
SYSTOLIC BLOOD PRESSURE: 135 MMHG | OXYGEN SATURATION: 99 % | HEART RATE: 98 BPM | TEMPERATURE: 97.9 F | DIASTOLIC BLOOD PRESSURE: 87 MMHG | RESPIRATION RATE: 16 BRPM

## 2023-01-19 DIAGNOSIS — J18.9 PNEUMONIA OF LEFT LOWER LOBE DUE TO INFECTIOUS ORGANISM: ICD-10-CM

## 2023-01-19 DIAGNOSIS — J96.21 ACUTE ON CHRONIC RESPIRATORY FAILURE WITH HYPOXIA (H): ICD-10-CM

## 2023-01-19 LAB
ALBUMIN SERPL BCG-MCNC: 3.4 G/DL (ref 3.5–5.2)
ALP SERPL-CCNC: 69 U/L (ref 40–129)
ALT SERPL W P-5'-P-CCNC: 14 U/L (ref 10–50)
ANION GAP SERPL CALCULATED.3IONS-SCNC: 10 MMOL/L (ref 7–15)
AST SERPL W P-5'-P-CCNC: 17 U/L (ref 10–50)
ATRIAL RATE - MUSE: 97 BPM
BASE EXCESS BLDV CALC-SCNC: 9.9 MMOL/L (ref -7.7–1.9)
BASOPHILS # BLD AUTO: 0 10E3/UL (ref 0–0.2)
BASOPHILS NFR BLD AUTO: 0 %
BILIRUB SERPL-MCNC: 0.4 MG/DL
BUN SERPL-MCNC: 18.5 MG/DL (ref 8–23)
CALCIUM SERPL-MCNC: 9.3 MG/DL (ref 8.8–10.2)
CHLORIDE SERPL-SCNC: 95 MMOL/L (ref 98–107)
CREAT SERPL-MCNC: 0.93 MG/DL (ref 0.67–1.17)
DEPRECATED HCO3 PLAS-SCNC: 33 MMOL/L (ref 22–29)
DIASTOLIC BLOOD PRESSURE - MUSE: NORMAL MMHG
EOSINOPHIL # BLD AUTO: 0.1 10E3/UL (ref 0–0.7)
EOSINOPHIL NFR BLD AUTO: 1 %
ERYTHROCYTE [DISTWIDTH] IN BLOOD BY AUTOMATED COUNT: 16.7 % (ref 10–15)
FLUAV RNA SPEC QL NAA+PROBE: NEGATIVE
FLUBV RNA RESP QL NAA+PROBE: NEGATIVE
GFR SERPL CREATININE-BSD FRML MDRD: 83 ML/MIN/1.73M2
GLUCOSE SERPL-MCNC: 186 MG/DL (ref 70–99)
HCO3 BLDV-SCNC: 38 MMOL/L (ref 21–28)
HCT VFR BLD AUTO: 43.2 % (ref 40–53)
HGB BLD-MCNC: 13.4 G/DL (ref 13.3–17.7)
HOLD SPECIMEN: NORMAL
HOLD SPECIMEN: NORMAL
IMM GRANULOCYTES # BLD: 0.1 10E3/UL
IMM GRANULOCYTES NFR BLD: 1 %
INTERPRETATION ECG - MUSE: NORMAL
LACTATE SERPL-SCNC: 1.5 MMOL/L (ref 0.7–2)
LYMPHOCYTES # BLD AUTO: 0.9 10E3/UL (ref 0.8–5.3)
LYMPHOCYTES NFR BLD AUTO: 7 %
MCH RBC QN AUTO: 30.1 PG (ref 26.5–33)
MCHC RBC AUTO-ENTMCNC: 31 G/DL (ref 31.5–36.5)
MCV RBC AUTO: 97 FL (ref 78–100)
MONOCYTES # BLD AUTO: 1.1 10E3/UL (ref 0–1.3)
MONOCYTES NFR BLD AUTO: 9 %
NEUTROPHILS # BLD AUTO: 9.8 10E3/UL (ref 1.6–8.3)
NEUTROPHILS NFR BLD AUTO: 82 %
NRBC # BLD AUTO: 0 10E3/UL
NRBC BLD AUTO-RTO: 0 /100
NT-PROBNP SERPL-MCNC: 197 PG/ML (ref 0–1800)
O2/TOTAL GAS SETTING VFR VENT: 3 %
OXYHGB MFR BLDV: 35 % (ref 70–75)
P AXIS - MUSE: 86 DEGREES
PCO2 BLDV: 64 MM HG (ref 40–50)
PH BLDV: 7.38 [PH] (ref 7.32–7.43)
PLATELET # BLD AUTO: 180 10E3/UL (ref 150–450)
PO2 BLDV: 23 MM HG (ref 25–47)
POTASSIUM SERPL-SCNC: 3.8 MMOL/L (ref 3.4–5.3)
PR INTERVAL - MUSE: 172 MS
PROCALCITONIN SERPL IA-MCNC: 0.06 NG/ML
PROT SERPL-MCNC: 7.7 G/DL (ref 6.4–8.3)
QRS DURATION - MUSE: 130 MS
QT - MUSE: 380 MS
QTC - MUSE: 482 MS
R AXIS - MUSE: -41 DEGREES
RBC # BLD AUTO: 4.45 10E6/UL (ref 4.4–5.9)
RSV RNA SPEC NAA+PROBE: NEGATIVE
SARS-COV-2 RNA RESP QL NAA+PROBE: NEGATIVE
SODIUM SERPL-SCNC: 138 MMOL/L (ref 136–145)
SYSTOLIC BLOOD PRESSURE - MUSE: NORMAL MMHG
T AXIS - MUSE: 31 DEGREES
VENTRICULAR RATE- MUSE: 97 BPM
WBC # BLD AUTO: 12 10E3/UL (ref 4–11)

## 2023-01-19 PROCEDURE — 85025 COMPLETE CBC W/AUTO DIFF WBC: CPT | Performed by: EMERGENCY MEDICINE

## 2023-01-19 PROCEDURE — 83605 ASSAY OF LACTIC ACID: CPT | Performed by: EMERGENCY MEDICINE

## 2023-01-19 PROCEDURE — 36415 COLL VENOUS BLD VENIPUNCTURE: CPT | Performed by: EMERGENCY MEDICINE

## 2023-01-19 PROCEDURE — 82805 BLOOD GASES W/O2 SATURATION: CPT | Performed by: EMERGENCY MEDICINE

## 2023-01-19 PROCEDURE — 94640 AIRWAY INHALATION TREATMENT: CPT

## 2023-01-19 PROCEDURE — 71275 CT ANGIOGRAPHY CHEST: CPT | Mod: ME

## 2023-01-19 PROCEDURE — 87637 SARSCOV2&INF A&B&RSV AMP PRB: CPT | Performed by: EMERGENCY MEDICINE

## 2023-01-19 PROCEDURE — 84145 PROCALCITONIN (PCT): CPT | Performed by: EMERGENCY MEDICINE

## 2023-01-19 PROCEDURE — 99285 EMERGENCY DEPT VISIT HI MDM: CPT | Mod: 25,CS

## 2023-01-19 PROCEDURE — 80053 COMPREHEN METABOLIC PANEL: CPT | Performed by: EMERGENCY MEDICINE

## 2023-01-19 PROCEDURE — 250N000009 HC RX 250: Performed by: EMERGENCY MEDICINE

## 2023-01-19 PROCEDURE — C9803 HOPD COVID-19 SPEC COLLECT: HCPCS

## 2023-01-19 PROCEDURE — 93005 ELECTROCARDIOGRAM TRACING: CPT

## 2023-01-19 PROCEDURE — 250N000013 HC RX MED GY IP 250 OP 250 PS 637: Performed by: EMERGENCY MEDICINE

## 2023-01-19 PROCEDURE — 83880 ASSAY OF NATRIURETIC PEPTIDE: CPT | Performed by: EMERGENCY MEDICINE

## 2023-01-19 PROCEDURE — 250N000011 HC RX IP 250 OP 636: Performed by: EMERGENCY MEDICINE

## 2023-01-19 RX ORDER — PANTOPRAZOLE SODIUM 40 MG/1
40 TABLET, DELAYED RELEASE ORAL 2 TIMES DAILY
COMMUNITY

## 2023-01-19 RX ORDER — AZITHROMYCIN 250 MG/1
TABLET, FILM COATED ORAL
Qty: 6 TABLET | Refills: 0 | Status: SHIPPED | OUTPATIENT
Start: 2023-01-19 | End: 2023-01-19

## 2023-01-19 RX ORDER — AZITHROMYCIN 250 MG/1
TABLET, FILM COATED ORAL
Qty: 4 TABLET | Refills: 0 | Status: SHIPPED | OUTPATIENT
Start: 2023-01-19 | End: 2023-04-03

## 2023-01-19 RX ORDER — IOPAMIDOL 755 MG/ML
76 INJECTION, SOLUTION INTRAVASCULAR ONCE
Status: COMPLETED | OUTPATIENT
Start: 2023-01-19 | End: 2023-01-19

## 2023-01-19 RX ORDER — AZITHROMYCIN 250 MG/1
500 TABLET, FILM COATED ORAL ONCE
Status: COMPLETED | OUTPATIENT
Start: 2023-01-19 | End: 2023-01-19

## 2023-01-19 RX ORDER — UREA 10 %
500 LOTION (ML) TOPICAL EVERY MORNING
COMMUNITY
Start: 2022-08-08 | End: 2023-09-20

## 2023-01-19 RX ORDER — IPRATROPIUM BROMIDE AND ALBUTEROL SULFATE 2.5; .5 MG/3ML; MG/3ML
3 SOLUTION RESPIRATORY (INHALATION)
Status: COMPLETED | OUTPATIENT
Start: 2023-01-19 | End: 2023-01-19

## 2023-01-19 RX ADMIN — AMOXICILLIN AND CLAVULANATE POTASSIUM 1 TABLET: 875; 125 TABLET ORAL at 14:57

## 2023-01-19 RX ADMIN — IPRATROPIUM BROMIDE AND ALBUTEROL SULFATE 3 ML: .5; 3 SOLUTION RESPIRATORY (INHALATION) at 12:12

## 2023-01-19 RX ADMIN — SODIUM CHLORIDE 98 ML: 9 INJECTION, SOLUTION INTRAVENOUS at 12:56

## 2023-01-19 RX ADMIN — IOPAMIDOL 76 ML: 755 INJECTION, SOLUTION INTRAVENOUS at 12:56

## 2023-01-19 RX ADMIN — AZITHROMYCIN MONOHYDRATE 500 MG: 250 TABLET ORAL at 14:57

## 2023-01-19 RX ADMIN — IPRATROPIUM BROMIDE AND ALBUTEROL SULFATE 3 ML: .5; 3 SOLUTION RESPIRATORY (INHALATION) at 12:13

## 2023-01-19 RX ADMIN — IPRATROPIUM BROMIDE AND ALBUTEROL SULFATE 3 ML: .5; 3 SOLUTION RESPIRATORY (INHALATION) at 12:11

## 2023-01-19 ASSESSMENT — ACTIVITIES OF DAILY LIVING (ADL)
ADLS_ACUITY_SCORE: 35
ADLS_ACUITY_SCORE: 35

## 2023-01-19 NOTE — ED PROVIDER NOTES
History     Chief Complaint:  No chief complaint on file.       HPI   Ron Gilmore is a 82 year old male past medical history significant for CVA, COPD, left-sided weakness who presents from his nursing home for hypoxia.  Patient reports gradually increasing shortness of breath and low-grade fevers, he denies any chest pain, chest pressure or heaviness but had some dyspnea with movement he is currently on 4 L nasal cannula, sounds like sats were as low as 70%.  He denies any leg swelling, does endorse some low-grade temps but no body aches, sore throat runny nose or cough.    After chart review and discussion with the nurse, it is confirmed that he does wear 2 L nasal cannula at home at all times.  In discussion with his nursing home, they are able to titrate up his oxygen as long as there is an order.      Independent Historian: Patient    Review of External Notes: Discharge summary 9/28/2022, admission for possible aspiration pneumonia with reported there are chronic hypoxic respiratory failure but not on oxygen currently.    ROS:  Review of Systems  10 point ROS completed, negative except as indicated in the HPI.    Allergies:  No Known Allergies     Medications:    acetaminophen (TYLENOL) 325 MG tablet  allopurinol (ZYLOPRIM) 300 MG tablet  ammonium lactate (LAC-HYDRIN) 12 % external lotion  aspirin (ASA) 81 MG EC tablet  atorvastatin (LIPITOR) 10 MG tablet  cyanocobalamin (VITAMIN B-12) 500 MCG SUBL sublingual tablet  Emollient (AMLACTIN ULTRA EX)  ferrous sulfate (FEROSUL) 325 (65 Fe) MG tablet  furosemide (LASIX) 40 MG tablet  hypromellose (ARTIFICIAL TEARS) 0.5 % SOLN ophthalmic solution  ipratropium - albuterol 0.5 mg/2.5 mg/3 mL (DUONEB) 0.5-2.5 (3) MG/3ML neb solution  ipratropium-albuterol (COMBIVENT RESPIMAT)  MCG/ACT inhaler  methenamine hippurate (HIPREX) 1 g tablet  metoprolol tartrate (LOPRESSOR) 25 MG tablet  pantoprazole (PROTONIX) 40 MG EC tablet  PARoxetine (PAXIL) 10 MG  tablet  PARoxetine (PAXIL) 40 MG tablet  polyethylene glycol (MIRALAX) 17 GM/Dose powder  simethicone (MYLICON) 125 MG chewable tablet  Skin Protectants, Misc. (CALAZIME SKIN PROTECTANT) PSTE        Past Medical History:    Past Medical History:   Diagnosis Date     BPH (benign prostatic hyperplasia)      Cataract      Cholelithiasis      COPD (chronic obstructive pulmonary disease) (H)      Depression      Diabetes mellitus      Dyshidrotic foot dermatitis      Edema      Gout      Hyperlipidemia      Hypertension      Infection due to 2019 novel coronavirus 5/1/2021     Kidney stones      Lumbago      Lumbar disc displacement without myelopathy      Muscle weakness      Neuropathy, diabetic (H)      Obesity      Spinal stenosis      Stroke (H)      Unsteady gait      Urinary retention with incomplete bladder emptying      UTI (urinary tract infection)      Vasovagal episode        Past Surgical History:    Past Surgical History:   Procedure Laterality Date     APPENDECTOMY OPEN       ARTHROSCOPY SHOULDER ROTATOR CUFF REPAIR       cataracts Bilateral      CHOLECYSTECTOMY       COLONOSCOPY  1986     COLONOSCOPY N/A 5/29/2021    Procedure: COLONOSCOPY;  Surgeon: Kofi Davis MD;  Location:  GI     CYSTOSCOPY  10/19/2011    Procedure:CYSTOSCOPY; CYSTOSCOPY, BLADDER STONE REMOVAL; Surgeon:ROB SAWYER; Location:[facility]     CYSTOSCOPY, TRANSURETHRAL RESECTION (TUR) PROSTATE, COMBINED N/A 2/21/2018    Procedure: COMBINED CYSTOSCOPY, TRANSURETHRAL RESECTION (TUR) PROSTATE;  COMBINED CYSTOSCOPY, TRANSURETHRAL RESECTION (TUR) PROSTATE ;  Surgeon: Rob Sawyer MD;  Location:  OR     EP LOOP RECORDER IMPLANT N/A 1/20/2020    Procedure: EP Loop Recorder Implant;  Surgeon: Evgeny Parisi MD;  Location:  HEART CARDIAC CATH LAB     ESOPHAGOSCOPY, GASTROSCOPY, DUODENOSCOPY (EGD), COMBINED N/A 5/28/2021    Procedure: ESOPHAGOGASTRODUODENOSCOPY (EGD);  Surgeon: Aurora Waterman MD;  Location:  GI      ESOPHAGOSCOPY, GASTROSCOPY, DUODENOSCOPY (EGD), DILATATION, COMBINED N/A 9/24/2022    Procedure: ESOPHAGOGASTRODUODENOSCOPY, WITH DILATION;  Surgeon: Kofi Davis MD;  Location:  GI     EYE SURGERY      right lid surgery      IR IVC FILTER PLACEMENT  5/24/2021     IR NEPHROSTOMY TUBE PLACEMENT RIGHT  3/9/2021     IR URETERAL STENT PLACEMENT RIGHT  3/16/2021     JOINT REPLACEMENT Right     HIP     KNEE SURGERY Bilateral      LAMINECTOMY LUMBAR ONE LEVEL       LASER HOLMIUM LITHOTRIPSY URETER(S), INSERT STENT, COMBINED Right 4/14/2021    Procedure: CYSTOSCOPY, BLADDER STONE REMOVAL, RIGHT URETEROSCOPY, HOLMIUM LASER LITHOTRIPSY, AND RIGHT STENT REMOVAL, RIGHT RETROGRADE;  Surgeon: Rob Sullivan MD;  Location:  OR     TONSILLECTOMY          Family History:    family history includes Prostate Cancer in his father.    Social History:   reports that he has quit smoking. He has never used smokeless tobacco. He reports that he does not drink alcohol and does not use drugs.  PCP: Kalen Metcalf     Physical Exam     Patient Vitals for the past 24 hrs:   BP Pulse Resp SpO2   01/19/23 1120 135/87 98 16 99 %        Physical Exam  Constitutional: Mildly drowsy, wakes with noxious stimuli  HENT:    Nose: Nose normal.    Mouth/Throat: Oropharynx is clear, mucous membranes are dry  Eyes: EOM are normal, anicteric, conjugate gaze  CV: regular rate and rhythm; no murmurs  Chest: Clear to auscultation bilaterally though decreased respiratory effort with mildly prolonged expiratory phase, no overt wheezing or rhonchi  GI:  non tender. No distension. No guarding or rebound.    MSK: No LE edema, no tenderness to palpation of BLE.  Neurological: Alert, attentive, contractures of the left side  Skin: Skin is warm and dry.      Emergency Department Course   ECG:  ECG results from 01/19/23   EKG 12 lead     Value    Systolic Blood Pressure     Diastolic Blood Pressure     Ventricular Rate 97    Atrial Rate 97    WY  Interval 172    QRS Duration 130        QTc 482    P Axis 86    R AXIS -41    T Axis 31    Interpretation ECG      Sinus rhythm  Left axis deviation  Right bundle branch block  Abnormal ECG  When compared with ECG of 01-MAY-2022 10:02,  No significant change was found           Imaging:  CT Chest Pulmonary Embolism w Contrast   Final Result   IMPRESSION:   1.  No pulmonary emboli.   2.  Advanced emphysema. Increased left basilar patchy opacities, which   could represent pneumonia and atelectasis.   3.  Stable mild mediastinal and hilar lymphadenopathy, nonspecific and   likely reactive.      PENNY DUBON MD            SYSTEM ID:  [id number]         Report per radiology    Laboratory:  Labs Ordered and Resulted from Time of ED Arrival to Time of ED Departure   CBC WITH PLATELETS AND DIFFERENTIAL - Abnormal       Result Value    WBC Count 12.0 (*)     RBC Count 4.45      Hemoglobin 13.4      Hematocrit 43.2      MCV 97      MCH 30.1      MCHC 31.0 (*)     RDW 16.7 (*)     Platelet Count 180      % Neutrophils 82      % Lymphocytes 7      % Monocytes 9      % Eosinophils 1      % Basophils 0      % Immature Granulocytes 1      NRBCs per 100 WBC 0      Absolute Neutrophils 9.8 (*)     Absolute Lymphocytes 0.9      Absolute Monocytes 1.1      Absolute Eosinophils 0.1      Absolute Basophils 0.0      Absolute Immature Granulocytes 0.1      Absolute NRBCs 0.0     LACTIC ACID WHOLE BLOOD - Normal    Lactic Acid 1.5     COMPREHENSIVE METABOLIC PANEL   INFLUENZA A/B & SARS-COV2 PCR MULTIPLEX   NT PROBNP INPATIENT   PROCALCITONIN   BLOOD GAS VENOUS WITH OXYHEMOGLOBIN          Emergency Department Course & Assessments:  Interventions:  Medications   ipratropium - albuterol 0.5 mg/2.5 mg/3 mL (DUONEB) neb solution 3 mL (3 mLs Nebulization Given 1/19/23 1213)   iopamidol (ISOVUE-370) solution 76 mL (76 mLs Intravenous Given 1/19/23 1256)   Saline Flush (98 mLs Intravenous Given 1/19/23 1256)        Independent Interpretation  (X-rays, CTs, rhythm strip):  EKG as above  CT PE at 1322, no overt central PE, no pneumothorax    Consultations/Discussion of Management or Tests:  None  Nursing was contacted, they are able to titrate up his oxygen    Social Determinants of Health affecting care:  Limited mobility due to stroke, requiring assistance to appointments.     Disposition:  Discharge back to nursing home with order for titratable oxygen up to 6 L and antibiotics.    Impression & Plan      Medical Decision Makin-year-old male past medical history significant for COPD, CVA with left-sided paresis presenting from his nursing home for slightly increased oxygen needs and cough.  He has a history of recurrent aspirations, last hospitalized in September.  Here he denies any chest pain, chest pressure or significant shortness of breath.  Given his debilitated status, CT PE study was obtained, this shows no evidence of PE but does show a left basilar atelectasis versus infiltrate, given his white count is mildly elevated, favor pneumonia, will cover with Augmentin and azithromycin for potential aspiration pneumonia.  He is afebrile here with stable vital signs and only 1 L or more per oxygen requirement at baseline.  Labs notable only for mildly elevated BNP however there is no pulmonary edema on CT chest, he is already on Lasix.  I do feel he is safe for discharge back to his nursing home, with titratable oxygen between 2 and 6 L and antibiotics.  Return precautions were reviewed, nursing home was informed and he was transported back via BLS.    Diagnosis:    ICD-10-CM    1. Pneumonia of left lower lobe due to infectious organism  J18.9 Home Oxygen Order for DME - ONLY FOR DME    history aspiration      2. Acute on chronic respiratory failure with hypoxia (H)  J96.21 Home Oxygen Order for DME - ONLY FOR DME           Discharge Medications:  New Prescriptions    AMOXICILLIN-CLAVULANATE (AUGMENTIN) 875-125 MG TABLET    Take 1 tablet by mouth  2 times daily for 7 days    AZITHROMYCIN (ZITHROMAX) 250 MG TABLET    Take 2 tablets (500 mg) by mouth daily for 1 day, THEN 1 tablet (250 mg) daily for 4 days.        Anatoliy Walker MD  [employer]  2:25 PM 01/19/23          Anatoliy Walker MD  01/19/23 3401

## 2023-01-19 NOTE — ED TRIAGE NOTES
Pt presents via EMS. Per report, patient comes from [facility] assisted living. Hx of COPD/CHF. Noted to have increased SOB and ROSS. Baseline on 2 liters oxygen. Hypoxic when EMS arrived, placed on 15 liters nonrebreather. Blood sugar 224.

## 2023-01-19 NOTE — DISCHARGE INSTRUCTIONS
You should take your antibiotics as prescribed, should you have persistent severe worsening breathing, persistent high fever or unable to take your antibiotics, you should return here to the emergency department.  It is okay to titrate your oxygen up to 6 L to keep your oxygen saturations in the mid 90s.  Given your history of COPD, you do not need sats of 98% or greater, in the mid 90s should be adequate.  You should otherwise follow-up with your primary doctor for recheck.

## 2023-01-19 NOTE — PHARMACY-ADMISSION MEDICATION HISTORY
Pharmacy Medication History  Admission medication history interview status for the 1/19/2023  admission is complete. See EPIC admission navigator for prior to admission medications     Location of Interview: Outside patient room but on unit  Medication history sources: Patient and Med list from [facility]    Significant changes made to the medication list:  none    In the past week, patient estimated taking medication this percent of the time: unknown    Medication Affordability:       Additional medication history information:   Patient said he got his morning meds  Both Combivent and Duoneb listed as scheduled    Medication reconciliation completed by provider prior to medication history? No    Time spent in this activity: 20 minutes    Prior to Admission medications    Medication Sig Last Dose Taking? Auth Provider Long Term End Date   acetaminophen (TYLENOL) 325 MG tablet Take 650 mg by mouth every 4 hours as needed for mild pain as needed for pain/fever Max dose 3000mg/24hr Unknown Yes Reported, Patient     allopurinol (ZYLOPRIM) 300 MG tablet Take 300 mg by mouth every morning 1/19/2023 at am Yes Unknown, Entered By History     ammonium lactate (LAC-HYDRIN) 12 % external lotion Apply topically daily Apply thin film to bilateral feet and legs 1/19/2023 at am Yes Unknown, Entered By History     aspirin (ASA) 81 MG EC tablet Take 1 tablet (81 mg) by mouth daily 1/19/2023 at am Yes Rg Tobin DO     atorvastatin (LIPITOR) 10 MG tablet Take 10 mg by mouth every morning 1/19/2023 at am Yes Unknown, Entered By History No    cyanocobalamin (VITAMIN B-12) 500 MCG tablet Take 500 mcg by mouth every morning 1/19/2023 at am Yes Unknown, Entered By History     Emollient (AMLACTIN ULTRA EX) Apply topically as needed TO FEET Unknown Yes Reported, Patient     ferrous sulfate (FEROSUL) 325 (65 Fe) MG tablet Take 325 mg by mouth 2 times daily 1/19/2023 at am Yes Reported, Patient     furosemide (LASIX) 40  MG tablet Take 1 tablet (40 mg) by mouth daily 1/19/2023 at am Yes Barron Duque MD Yes    hypromellose (ARTIFICIAL TEARS) 0.5 % SOLN ophthalmic solution 2 drops 2 times daily Into the affected eye (eye irritation) in addition to 2 drops twice daily PRN 1/19/2023 at am Yes Unknown, Entered By History     ipratropium - albuterol 0.5 mg/2.5 mg/3 mL (DUONEB) 0.5-2.5 (3) MG/3ML neb solution Take 1 vial by nebulization 2 times daily In addition to 1 vial twice a day PRN 1/19/2023 at am Yes Unknown, Entered By History No    ipratropium-albuterol (COMBIVENT RESPIMAT)  MCG/ACT inhaler Inhale 1 puff into the lungs 4 times daily 0900, 1200 ,1600 ,2000 1/19/2023 at 0900 Yes Reported, Patient No    methenamine hippurate (HIPREX) 1 g tablet Take 1 g by mouth 2 times daily 1/19/2023 at am Yes Unknown, Entered By History     metoprolol tartrate (LOPRESSOR) 25 MG tablet Take 0.5 tablets (12.5 mg) by mouth 2 times daily 1/19/2023 at am Yes Romulo Cavanaugh MD Yes    pantoprazole (PROTONIX) 40 MG EC tablet Take 40 mg by mouth 2 times daily 1/19/2023 at am Yes Unknown, Entered By History     PARoxetine (PAXIL) 10 MG tablet Take 10 mg by mouth every morning Take with 40mg tablet to equal 50mg total 1/19/2023 at am Yes Unknown, Entered By History No    PARoxetine (PAXIL) 40 MG tablet Take 40 mg by mouth every morning Take with 10mg tablet to equal 50mg total 1/19/2023 at am Yes Unknown, Entered By History No    polyethylene glycol (MIRALAX) 17 GM/Dose powder Take 17 g by mouth daily 1/19/2023 at am Yes Romulo Cavanaugh MD     simethicone (MYLICON) 125 MG chewable tablet Take 125 mg by mouth 3 times daily as needed for intestinal gas Unknown Yes Unknown, Entered By History     Skin Protectants, Misc. (CALAZIME SKIN PROTECTANT) PSTE Externally apply topically 2 times daily Apply to buttocks topically for Excoriation to buttocks/unstageable. Apply thin layer to buttocks.  Also apply to buttocks topically twice daily as  needed to vincent area/buttocks 1/19/2023 at am Yes Reported, Patient         The information provided in this note is only as accurate as the sources available at the time of update(s)

## 2023-01-30 ENCOUNTER — HOSPITAL ENCOUNTER (EMERGENCY)
Facility: CLINIC | Age: 83
Discharge: HOME | End: 2023-01-30
Attending: EMERGENCY MEDICINE | Admitting: EMERGENCY MEDICINE
Payer: MEDICARE

## 2023-01-30 ENCOUNTER — APPOINTMENT (OUTPATIENT)
Dept: CT IMAGING | Facility: CLINIC | Age: 83
End: 2023-01-30
Attending: EMERGENCY MEDICINE
Payer: MEDICARE

## 2023-01-30 VITALS
OXYGEN SATURATION: 93 % | TEMPERATURE: 97.4 F | DIASTOLIC BLOOD PRESSURE: 78 MMHG | RESPIRATION RATE: 13 BRPM | SYSTOLIC BLOOD PRESSURE: 111 MMHG | HEART RATE: 84 BPM

## 2023-01-30 DIAGNOSIS — Z86.73 HISTORY OF STROKE: ICD-10-CM

## 2023-01-30 DIAGNOSIS — R41.82 ALTERED MENTAL STATUS, UNSPECIFIED ALTERED MENTAL STATUS TYPE: ICD-10-CM

## 2023-01-30 DIAGNOSIS — R10.84 ABDOMINAL PAIN, GENERALIZED: ICD-10-CM

## 2023-01-30 LAB
ALBUMIN SERPL BCG-MCNC: 3.5 G/DL (ref 3.5–5.2)
ALP SERPL-CCNC: 84 U/L (ref 40–129)
ALT SERPL W P-5'-P-CCNC: 22 U/L (ref 10–50)
ANION GAP SERPL CALCULATED.3IONS-SCNC: 11 MMOL/L (ref 7–15)
AST SERPL W P-5'-P-CCNC: 22 U/L (ref 10–50)
ATRIAL RATE - MUSE: 88 BPM
BASOPHILS # BLD AUTO: 0 10E3/UL (ref 0–0.2)
BASOPHILS NFR BLD AUTO: 0 %
BILIRUB SERPL-MCNC: 0.3 MG/DL
BUN SERPL-MCNC: 16 MG/DL (ref 8–23)
CALCIUM SERPL-MCNC: 9.4 MG/DL (ref 8.8–10.2)
CHLORIDE SERPL-SCNC: 95 MMOL/L (ref 98–107)
CREAT SERPL-MCNC: 0.91 MG/DL (ref 0.67–1.17)
DEPRECATED HCO3 PLAS-SCNC: 33 MMOL/L (ref 22–29)
DIASTOLIC BLOOD PRESSURE - MUSE: NORMAL MMHG
EOSINOPHIL # BLD AUTO: 0.3 10E3/UL (ref 0–0.7)
EOSINOPHIL NFR BLD AUTO: 3 %
ERYTHROCYTE [DISTWIDTH] IN BLOOD BY AUTOMATED COUNT: 16.6 % (ref 10–15)
GFR SERPL CREATININE-BSD FRML MDRD: 85 ML/MIN/1.73M2
GLUCOSE SERPL-MCNC: 176 MG/DL (ref 70–99)
HCT VFR BLD AUTO: 49.2 % (ref 40–53)
HGB BLD-MCNC: 14.8 G/DL (ref 13.3–17.7)
IMM GRANULOCYTES # BLD: 0.1 10E3/UL
IMM GRANULOCYTES NFR BLD: 1 %
INTERPRETATION ECG - MUSE: NORMAL
LIPASE SERPL-CCNC: 23 U/L (ref 13–60)
LYMPHOCYTES # BLD AUTO: 1.3 10E3/UL (ref 0.8–5.3)
LYMPHOCYTES NFR BLD AUTO: 13 %
MCH RBC QN AUTO: 29.9 PG (ref 26.5–33)
MCHC RBC AUTO-ENTMCNC: 30.1 G/DL (ref 31.5–36.5)
MCV RBC AUTO: 99 FL (ref 78–100)
MONOCYTES # BLD AUTO: 0.7 10E3/UL (ref 0–1.3)
MONOCYTES NFR BLD AUTO: 7 %
NEUTROPHILS # BLD AUTO: 7.9 10E3/UL (ref 1.6–8.3)
NEUTROPHILS NFR BLD AUTO: 76 %
NRBC # BLD AUTO: 0 10E3/UL
NRBC BLD AUTO-RTO: 0 /100
P AXIS - MUSE: 78 DEGREES
PLATELET # BLD AUTO: 276 10E3/UL (ref 150–450)
POTASSIUM SERPL-SCNC: 3.7 MMOL/L (ref 3.4–5.3)
PR INTERVAL - MUSE: 186 MS
PROT SERPL-MCNC: 8 G/DL (ref 6.4–8.3)
QRS DURATION - MUSE: 138 MS
QT - MUSE: 408 MS
QTC - MUSE: 493 MS
R AXIS - MUSE: 27 DEGREES
RBC # BLD AUTO: 4.95 10E6/UL (ref 4.4–5.9)
SODIUM SERPL-SCNC: 139 MMOL/L (ref 136–145)
SYSTOLIC BLOOD PRESSURE - MUSE: NORMAL MMHG
T AXIS - MUSE: 36 DEGREES
TROPONIN T SERPL HS-MCNC: 19 NG/L
TROPONIN T SERPL HS-MCNC: 23 NG/L
VENTRICULAR RATE- MUSE: 88 BPM
WBC # BLD AUTO: 10.3 10E3/UL (ref 4–11)

## 2023-01-30 PROCEDURE — 250N000011 HC RX IP 250 OP 636: Performed by: EMERGENCY MEDICINE

## 2023-01-30 PROCEDURE — 99285 EMERGENCY DEPT VISIT HI MDM: CPT | Mod: 25

## 2023-01-30 PROCEDURE — 83690 ASSAY OF LIPASE: CPT | Performed by: EMERGENCY MEDICINE

## 2023-01-30 PROCEDURE — 74177 CT ABD & PELVIS W/CONTRAST: CPT | Mod: MG

## 2023-01-30 PROCEDURE — 84484 ASSAY OF TROPONIN QUANT: CPT | Performed by: EMERGENCY MEDICINE

## 2023-01-30 PROCEDURE — G1010 CDSM STANSON: HCPCS

## 2023-01-30 PROCEDURE — 999N000128 HC STATISTIC PERIPHERAL IV START W/O US GUIDANCE

## 2023-01-30 PROCEDURE — 93005 ELECTROCARDIOGRAM TRACING: CPT | Mod: RTG

## 2023-01-30 PROCEDURE — 250N000009 HC RX 250: Performed by: EMERGENCY MEDICINE

## 2023-01-30 PROCEDURE — 85025 COMPLETE CBC W/AUTO DIFF WBC: CPT | Performed by: EMERGENCY MEDICINE

## 2023-01-30 PROCEDURE — 36415 COLL VENOUS BLD VENIPUNCTURE: CPT | Performed by: EMERGENCY MEDICINE

## 2023-01-30 PROCEDURE — 80053 COMPREHEN METABOLIC PANEL: CPT | Performed by: EMERGENCY MEDICINE

## 2023-01-30 RX ORDER — IOPAMIDOL 755 MG/ML
107 INJECTION, SOLUTION INTRAVASCULAR ONCE
Status: COMPLETED | OUTPATIENT
Start: 2023-01-30 | End: 2023-01-30

## 2023-01-30 RX ADMIN — SODIUM CHLORIDE 98 ML: 9 INJECTION, SOLUTION INTRAVENOUS at 16:39

## 2023-01-30 RX ADMIN — IOPAMIDOL 107 ML: 755 INJECTION, SOLUTION INTRAVENOUS at 16:39

## 2023-01-30 ASSESSMENT — ACTIVITIES OF DAILY LIVING (ADL)
ADLS_ACUITY_SCORE: 35
ADLS_ACUITY_SCORE: 37

## 2023-01-30 NOTE — ED PROVIDER NOTES
"  History   Chief Complaint:  \"I need to poop\"    History limited by: Poor Historian.   History supplemented by electronic chart review, as well as phone call to the patient's wife later in his ED course.    Ron Gilmore is a 82 year old male who presents by EMS for evaluation. Earlier today, the nursing staff at his assisted living facility noticed a change in his responsiveness, so EMS was called to bring him to the ED. Upon being brought outside of the facility by the EMS personnel, paramedics state that his mental status improved immediately.  No apparent loss of consciousness, no witnessed seizure activity.  He has a history of ischemic stroke leaving him with left-sided hemiparesis for which she is not able to walk, and he resides at [facility].  He told EMS that he had nausea and abdominal pain. Also, he feels that \"he is going to explode\". At bedside, he claims to be constipated, but notes his last bowel movement was this morning.     He complaining of being chronically short of breath as well as being sleepy. The staff called the family, and the family wanted him to be brought to the ED.     He is DNR/DNI.      Review of External Notes: I personally performed electronic chart review, noting that he was in the ED on January 19 for hypoxia, CT of pulmonary embolism protocol was performed showing possible pneumonia, he was prescribed antibiotics.    ROS:  Review of Systems   Unable to perform ROS: Other (Poor Historian)     Allergies:  No Known Allergies     Medications:    Allopurinol   Aspirin   Atorvastatin   Azithromycin   Emollient   Furosemide   Duoneb   Combivent Respimat   Hiprex   Lopressor   Pantoprazole   Simethicone     Past Medical History:    BPH (benign prostatic hyperplasia)   Cataract   Cholelithiasis   COPD (chronic obstructive pulmonary disease) (H)   Depression   Diabetes mellitus   Dyshidrotic foot dermatitis   Edema   Gout   Hyperlipidemia   Hypertension   Infection due to 2019 " novel coronavirus   Kidney stones   Lumbago   Lumbar disc displacement without myelopathy   Muscle weakness   Neuropathy, diabetic (H)   Obesity   Spinal stenosis   Stroke (H)   Unsteady gait   Urinary retention with incomplete bladder emptying   UTI (urinary tract infection)   Vasovagal episode     Past Surgical History:    Appendectomy   Unspecified shoulder rotator cuff repair   Bilateral cataracts extraction   Cystoscopy w/ bladder stone removal   TURP procedure   Loop recorder implant   Unspecified R eye lid surgery   IVC filter placement   R nephrostomy w/ tube placement   R ureteral stent placement   Unspecified R joint placement   Unspecified bilateral knee surgery   Lumbar laminectomy   Tonsillectomy     Family History:    Father - prostate cancer     Social History:  Hx of smoking; denies smokeless tobacco use, alcohol use, or drug use   PCP: Kalen Metcalf   The patient presents to the ED alone via EMS    Physical Exam     Patient Vitals for the past 24 hrs:   BP Temp Temp src Pulse Resp SpO2   01/30/23 1410 (!) 172/95 97.4  F (36.3  C) Oral -- 13 --   01/30/23 1408 -- -- -- 84 13 93 %      Physical Exam  General: Chronically ill-appearing male recumbent in room 30  HENT: mucous membranes moist  CV: rate as above, regular rhythm, no murmur audible  Resp: normal effort, speaks in full phrases, no stridor, no cough observed  GI: abdomen soft and mild diffuse tenderness, no guarding  Cardona catheter in place draining yellow urine  Rectal: Large amount of soft but formed pasty dark brown stool in underwear, no blood, no melena  Stool present in rectum on [place] but no hard stool balls  MSK: no bony tenderness  Skin: appropriately warm and dry  Neuro: alert, clear speech, oriented though poor historian, left-sided hemiparesis (pre-existing)  Psych: cooperative, no apparent hallucinations    Emergency Department Course   ECG:  ECG taken at 1518, ECG read at 1533  Normal sinus rhythm   Right bundle branch block  (old)   T wave abnormality, consider lateral ischemia   Rate 88 bpm. RI interval 186 ms. QRS duration 138 ms. QT/QTc 408/493 ms. P-R-T axes 78 27 36.     Imaging:  CT Chest (PE) Abdomen Pelvis w Contrast  Preliminary Result  IMPRESSION:  1.  No evidence of pulmonary embolism.  2.  Upper lobes predominant emphysematous changes.  3.  The urinary bladder is decompressed with Cardona's catheter with diffuse urinary bladder wall thickening, could be due to underdistention versus chronic bladder outlet obstruction versus chronic cystitis.  4.  Large stool ball in the rectum, can be seen with fecal impaction.    CT Head w/o Contrast  Final Result  IMPRESSION:  1.  No CT evidence for acute intracranial process.  2.  Brain atrophy and presumed chronic microvascular ischemic changes as above.     Report per radiology    Laboratory:  Labs Ordered and Resulted from Time of ED Arrival to Time of ED Departure   COMPREHENSIVE METABOLIC PANEL - Abnormal       Result Value    Sodium 139      Potassium 3.7      Chloride 95 (*)     Carbon Dioxide (CO2) 33 (*)     Anion Gap 11      Urea Nitrogen 16.0      Creatinine 0.91      Calcium 9.4      Glucose 176 (*)     Alkaline Phosphatase 84      AST 22      ALT 22      Protein Total 8.0      Albumin 3.5      Bilirubin Total 0.3      GFR Estimate 85     TROPONIN T, HIGH SENSITIVITY - Abnormal    Troponin T, High Sensitivity 23 (*)    CBC WITH PLATELETS AND DIFFERENTIAL - Abnormal    WBC Count 10.3      RBC Count 4.95      Hemoglobin 14.8      Hematocrit 49.2      MCV 99      MCH 29.9      MCHC 30.1 (*)     RDW 16.6 (*)     Platelet Count 276      % Neutrophils 76      % Lymphocytes 13      % Monocytes 7      % Eosinophils 3      % Basophils 0      % Immature Granulocytes 1      NRBCs per 100 WBC 0      Absolute Neutrophils 7.9      Absolute Lymphocytes 1.3      Absolute Monocytes 0.7      Absolute Eosinophils 0.3      Absolute Basophils 0.0      Absolute Immature Granulocytes 0.1      Absolute  NRBCs 0.0     LIPASE - Normal    Lipase 23     TROPONIN T, HIGH SENSITIVITY - Normal    Troponin T, High Sensitivity 19        Emergency Department Course & Assessments:    Interventions:  Medications   iopamidol (ISOVUE-370) solution 107 mL (107 mLs Intravenous Given 1/30/23 1639)   Saline Flush (98 mLs Intravenous Given 1/30/23 1639)       Independent Interpretation (X-rays, CTs, rhythm strip):  I personally reviewed CT imaging, large stool burden in rectum, no PE.    Consultations/Discussion of Management or Tests:  1455 I obtained history and examined the patient as noted above.  1752 I spoke with the patient's wife, who provides further history.   I rechecked the patient and explained findings.    Disposition:  The patient was discharged to care facility    Impression & Plan    Medical Decision Making:  He was referred here for an episode of decreased responsiveness, unfortunately I am unable to obtain further details of this.  I note he was recently here with concern for hypoxia though found to have a possible infection.  He is oxygenating well on room air here, no respiratory distress.  A broad work-up was performed as above, fortunately no signs of intracranial hemorrhage, no acute focal neurologic deficits are detected, noting that he has prior hemiparesis from a stroke.  He has a benign abdominal exam but given reported pain and concern for his bowel movement CT was performed with results as above.  He has a significant stool burden in his rectum though this is very soft stool, he has passed large volumes of soft stool here, nonbloody, I do not think that he needs an enema or other more aggressive bowel regimen at this time though he can use his as needed MiraLAX which I see on his medication list.  I spoke with the patient's wife, acknowledge diagnostic uncertainty, discussed test results, she is very comfortable with him being discharged back to his care facility.  Patient states that he has no symptoms  at the time of discharge.  He is welcome back for crisis and would benefit from close follow-up through his primary clinician in the next few days.    Diagnosis:    ICD-10-CM    1. Altered mental status, unspecified altered mental status type  R41.82       2. History of stroke  Z86.73       3. Abdominal pain, generalized  R10.84         Scribe Disclosure:  I, Hernandostella Lucero, am serving as a scribe at 2:45 PM on 1/30/2023 to document services personally performed by aZne Denton MD based on my observations and the provider's statements to me.     1/30/2023   Zane Denton MD Reitsema, Jeffrey Alan, MD  01/30/23 1938

## 2023-01-30 NOTE — ED TRIAGE NOTES
"    Per EMS: Pt from [facility]. Nursing staff called for change in responsiveness. EMS stated pt was initially more somnolent but immediately woke up upon going outside. Now alert and oriented X 4. C/o nausea, abd pain, \"I feel like I'm going to explode\". Endorsed BM this AM but stated he both feels like he really needs to go, but doesn't feel urge.  "

## 2023-01-30 NOTE — ED NOTES
Bed: ED30  Expected date:   Expected time:   Means of arrival:   Comments:  Saira - 82 M weakness eta 9171

## 2023-01-31 NOTE — DISCHARGE INSTRUCTIONS
Discharge Instructions  Abdominal Pain    You can use the medicine already prescribed to help keep your bowel movements regular and help clean out your intestines.    Abdominal pain (belly pain) can be caused by many things. Your evaluation today does not show the exact cause for your pain. Your provider today has decided that it is unlikely your pain is due to a life threatening problem, or a problem requiring surgery or hospital admission. Sometimes those problems cannot be found right away, so it is very important that you follow up as directed.  Sometimes only the changes which occur over time allow the cause of your pain to be found.    Generally, every Emergency Department visit should have a follow-up clinic visit with either a primary or a specialty clinic/provider. Please follow-up as instructed by your emergency provider today. With abdominal pain, we often recommend very close follow-up, such as the following day.    ADULTS:  Return to the Emergency Department right away if:    You get an oral temperature above 102oF or as directed by your provider.  You have blood in your stools. This may be bright red or appear as black, tarry stools.    You keep vomiting (throwing up) or cannot drink liquids.  You see blood when you vomit.   You cannot have a bowel movement or you cannot pass gas.  Your stomach gets bloated or bigger.  Your skin or the whites of your eyes look yellow.  You faint.  You have bloody, frequent or painful urination (peeing).  You have new symptoms or anything that worries you.    CHILDREN:  Return to the Emergency Department right away if your child has any of the above-listed symptoms or the following:    Pushes your hand away or screams/cries when his/her belly is touched.  You notice your child is very fussy or weak.  Your child is very tired and is too tired to eat or drink.  Your child is dehydrated.  Signs of dehydration can be:  Significant change in the amount of wet  diapers/urine.  Your infant or child starts to have dry mouth and lips, or no saliva (spit) or tears.    PREGNANT WOMEN:  Return to the Emergency Department right away if you have any of the above-listed symptoms or the following:    You have bleeding, leaking fluid or passing tissue from the vagina.  You have worse pain or cramping, or pain in your shoulder or back.  You have vomiting that will not stop.  You have a temperature of 100oF or more.  Your baby is not moving as much as usual.  You faint.  You get a bad headache with or without eye problems and abdominal pain.  You have a seizure.  You have unusual discharge from your vagina and abdominal pain.    Abdominal pain is pretty common during pregnancy.  Your pain may or may not be related to your pregnancy. You should follow-up closely with your OB provider so they can evaluate you and your baby.  Until you follow-up with your regular provider, do the following:     Avoid sex and do not put anything in your vagina.  Drink clear fluids.  Only take medications approved by your provider.    MORE INFORMATION:    Appendicitis:  A possible cause of abdominal pain in any person who still has their appendix is acute appendicitis. Appendicitis is often hard to diagnose.  Testing does not always rule out early appendicitis or other causes of abdominal pain. Close follow-up with your provider and re-evaluations may be needed to figure out the reason for your abdominal pain.    Follow-up:  It is very important that you make an appointment with your clinic and go to the appointment.  If you do not follow-up with your primary provider, it may result in missing an important development which could result in permanent injury or disability and/or lasting pain.  If there is any problem keeping your appointment, call your provider or return to the Emergency Department.    Medications:  Take your medications as directed by your provider today.  Before using over-the-counter  "medications, ask your provider and make sure to take the medications as directed.  If you have any questions about medications, ask your provider.    Diet:  Resume your normal diet as much as possible, but do not eat fried, fatty or spicy foods while you have pain.  Do not drink alcohol or have caffeine.  Do not smoke tobacco.    Probiotics: If you have been given an antibiotic, you may want to also take a probiotic pill or eat yogurt with live cultures. Probiotics have \"good bacteria\" to help your intestines stay healthy. Studies have shown that probiotics help prevent diarrhea (loose stools) and other intestine problems (including C. diff infection) when you take antibiotics. You can buy these without a prescription in the pharmacy section of the store.     If you were given a prescription for medicine here today, be sure to read all of the information (including the package insert) that comes with your prescription.  This will include important information about the medicine, its side effects, and any warnings that you need to know about.  The pharmacist who fills the prescription can provide more information and answer questions you may have about the medicine.  If you have questions or concerns that the pharmacist cannot address, please call or return to the Emergency Department.       Remember that you can always come back to the Emergency Department if you are not able to see your regular provider in the amount of time listed above, if you get any new symptoms, or if there is anything that worries you.    "

## 2023-01-31 NOTE — ED NOTES
[facility] notifed pt would be returning this evening. Stretcher transport set up. Communicated pt should receive PRN Miralax tonight d/t CT results to ease abd discomfort.

## 2023-03-01 ENCOUNTER — HOSPITAL ENCOUNTER (EMERGENCY)
Facility: CLINIC | Age: 83
Discharge: NURSING, REHAB OR LONG-TERM CARE | End: 2023-03-01
Attending: EMERGENCY MEDICINE | Admitting: EMERGENCY MEDICINE
Payer: MEDICARE

## 2023-03-01 VITALS
TEMPERATURE: 98.4 F | HEIGHT: 72 IN | HEART RATE: 90 BPM | WEIGHT: 240 LBS | DIASTOLIC BLOOD PRESSURE: 84 MMHG | BODY MASS INDEX: 32.51 KG/M2 | RESPIRATION RATE: 20 BRPM | OXYGEN SATURATION: 98 % | SYSTOLIC BLOOD PRESSURE: 127 MMHG

## 2023-03-01 DIAGNOSIS — N39.0 URINARY TRACT INFECTION ASSOCIATED WITH INDWELLING URETHRAL CATHETER, INITIAL ENCOUNTER (H): ICD-10-CM

## 2023-03-01 DIAGNOSIS — T83.9XXA FOLEY CATHETER PROBLEM, INITIAL ENCOUNTER (H): ICD-10-CM

## 2023-03-01 DIAGNOSIS — R33.9 URINARY RETENTION: ICD-10-CM

## 2023-03-01 DIAGNOSIS — T83.511A URINARY TRACT INFECTION ASSOCIATED WITH INDWELLING URETHRAL CATHETER, INITIAL ENCOUNTER (H): ICD-10-CM

## 2023-03-01 LAB
ALBUMIN UR-MCNC: 30 MG/DL
APPEARANCE UR: ABNORMAL
BACTERIA #/AREA URNS HPF: ABNORMAL /HPF
BILIRUB UR QL STRIP: NEGATIVE
COLOR UR AUTO: YELLOW
GLUCOSE UR STRIP-MCNC: NEGATIVE MG/DL
HGB UR QL STRIP: ABNORMAL
KETONES UR STRIP-MCNC: NEGATIVE MG/DL
LEUKOCYTE ESTERASE UR QL STRIP: ABNORMAL
MUCOUS THREADS #/AREA URNS LPF: PRESENT /LPF
NITRATE UR QL: POSITIVE
PH UR STRIP: 6.5 [PH] (ref 5–7)
RBC URINE: 76 /HPF
SP GR UR STRIP: 1.02 (ref 1–1.03)
UROBILINOGEN UR STRIP-MCNC: NORMAL MG/DL
WBC CLUMPS #/AREA URNS HPF: PRESENT /HPF
WBC URINE: 146 /HPF

## 2023-03-01 PROCEDURE — 99283 EMERGENCY DEPT VISIT LOW MDM: CPT | Mod: 25

## 2023-03-01 PROCEDURE — 81001 URINALYSIS AUTO W/SCOPE: CPT | Performed by: EMERGENCY MEDICINE

## 2023-03-01 PROCEDURE — 51702 INSERT TEMP BLADDER CATH: CPT

## 2023-03-01 PROCEDURE — 87086 URINE CULTURE/COLONY COUNT: CPT | Performed by: EMERGENCY MEDICINE

## 2023-03-01 RX ORDER — CEPHALEXIN 500 MG/1
500 CAPSULE ORAL 3 TIMES DAILY
Qty: 21 CAPSULE | Refills: 0 | Status: SHIPPED | OUTPATIENT
Start: 2023-03-01 | End: 2023-03-08

## 2023-03-01 ASSESSMENT — ENCOUNTER SYMPTOMS
DIFFICULTY URINATING: 1
ABDOMINAL PAIN: 1

## 2023-03-01 ASSESSMENT — ACTIVITIES OF DAILY LIVING (ADL)
ADLS_ACUITY_SCORE: 35
ADLS_ACUITY_SCORE: 35

## 2023-03-01 NOTE — ED NOTES
Attempted to flush chirinos catheter without success.  Chirinos catheter replaced with output of 320 ml.

## 2023-03-01 NOTE — ED TRIAGE NOTES
Patient presents with urinary retention since yesterday around 1700.  He has a chirinos catheter and states it occasionally needs to be flushed.  He reports the staff at the facility are unable to flush the catheter.  He reports nursing staff comes to his facility to do catheter cares.   Bladder scan shows 382 ml of retained urine.     Triage Assessment     Row Name 03/01/23 0541       Triage Assessment (Adult)    Airway WDL WDL       Respiratory WDL    Respiratory WDL WDL       Skin Circulation/Temperature WDL    Skin Circulation/Temperature WDL WDL       Peripheral/Neurovascular WDL    Peripheral Neurovascular WDL WDL       Cognitive/Neuro/Behavioral WDL    Cognitive/Neuro/Behavioral WDL WDL

## 2023-03-01 NOTE — ED PROVIDER NOTES
History     Chief Complaint:  Urinary Retention       HPI   Ron Gilmore is a 82 year old male with a history of diabetes and HTN who presents with urinary retention. The patient states that at 2000 last night began to experience urinary retention and abdominal pain. He lives at [facility] in [place] and the staff there did not know how to flush his catheter so he was sent here. He has a history of his catheter getting clogged in the past.       Independent Historian:   None - Patient Only    Review of External Notes:   Urine Culture  Order: [id number]   Collected 5/1/2022 10:30 AM      Status: Final result      Visible to patient: No (inaccessible in MyChart)     Specimen Information: Urine, Cardona Catheter    0 Result Notes  Culture >100,000 CFU/mL Escherichia coli Abnormal             Resulting Agency: [facility]     Susceptibility     Escherichia coli     ZION     Ampicillin Resistant     Ampicillin/ Sulbactam Susceptible     Cefazolin Susceptible 1     Cefepime Susceptible     Cefoxitin Susceptible     Ceftazidime Susceptible     Ceftriaxone Susceptible     Ciprofloxacin Resistant     Gentamicin Susceptible     Levofloxacin Resistant     Nitrofurantoin Susceptible     Piperacillin/Tazobactam Susceptible     Tobramycin Susceptible     Trimethoprim/Sulfamethoxazole Resistant              1 Cefazolin ZION breakpoints are for the treatment of uncomplicated urinary tract infections. For the treatment of systemic infections, please contact the laboratory for additional testing.            Specimen Collected: 05/01/22 10:30 AM Last Resulted: 05/02/22  9:19 PM               ROS:  Review of Systems   Gastrointestinal: Positive for abdominal pain.   Genitourinary: Positive for difficulty urinating.   All other systems reviewed and are negative.      Allergies:  No Known Allergies      Medications:    Allopurinol   Aspirin   Atorvastatin   Azithromycin   Emollient   Furosemide   Duoneb   Combivent Respimat   Hiprex   Lopressor    Pantoprazole   Simethicone      Past Medical History:    BPH (benign prostatic hyperplasia)      Cataract            Cholelithiasis     COPD (chronic obstructive pulmonary disease) (H)    Depression        Diabetes mellitus          Dyshidrotic foot dermatitis        Edema   Gout      Hyperlipidemia              Hypertension     Kidney stones   Lumbago           Lumbar disc displacement without myelopathy           Neuropathy, diabetic (H)           Obesity              Spinal stenosis              Stroke (H)          UTI (urinary tract infection)          Past Surgical History:    Appendectomy   Unspecified shoulder rotator cuff repair   Bilateral cataracts extraction   Cystoscopy w/ bladder stone removal   TURP procedure   Loop recorder implant   Unspecified R eye lid surgery   IVC filter placement   R nephrostomy w/ tube placement   R ureteral stent placement   Unspecified R joint placement   Unspecified bilateral knee surgery   Lumbar laminectomy   Tonsillectomy      Family History:    Father - prostate cancer     Social History:  The patient presents to the ED via EMS.    Physical Exam     Patient Vitals for the past 24 hrs:   BP Temp Temp src Pulse Resp SpO2 Height Weight   03/01/23 0538 136/88 98.4  F (36.9  C) Oral 99 18 98 % 1.829 m (6') 108.9 kg (240 lb)        Physical Exam    General: Alert and Interactive.   Head: No signs of trauma.   Mouth/Throat: Oropharynx is clear and moist.   Eyes: Conjunctivae are normal. Pupils are equal, round, and reactive to light.   Neck: Normal range of motion. No nuchal rigidity.   CV: Normal rate and regular rhythm.    Resp: Effort normal and breath sounds normal. No respiratory distress.   GI: Soft. There is no tenderness or guarding.   MSK: Normal range of motion. no edema.   Neuro: The patient is alert and oriented to person, place, and time.  PERRLA, EOMI, strength in upper/lower extremities normal and symmetrical.   Sensation normal. Speech normal.  GCS eye  subscore is 4. GCS verbal subscore is 5. GCS motor subscore is 6.   Skin: Skin is warm and dry. No rash noted.   Psych: normal mood and affect. behavior is normal.     Emergency Department Course     Imaging:  No orders to display        Laboratory:  Labs Ordered and Resulted from Time of ED Arrival to Time of ED Departure   UA MACROSCOPIC WITH REFLEX TO MICRO AND CULTURE - Abnormal       Result Value    Color Urine Yellow      Appearance Urine Slightly Cloudy (*)     Glucose Urine Negative      Bilirubin Urine Negative      Ketones Urine Negative      Specific Gravity Urine 1.020      Blood Urine Moderate (*)     pH Urine 6.5      Protein Albumin Urine 30 (*)     Urobilinogen Urine Normal      Nitrite Urine Positive (*)     Leukocyte Esterase Urine Large (*)     Bacteria Urine Moderate (*)     WBC Clumps Urine Present (*)     Mucus Urine Present (*)     RBC Urine 76 (*)     WBC Urine 146 (*)    URINE CULTURE        Procedures   Chirinos catheter was changed    Emergency Department Course & Assessments:  Interventions:  Medications - No data to display     Independent Interpretation (X-rays, CTs, rhythm strip):  none    Social Determinants of Health affecting care:   Healthcare Access/Compliance    Assessments:  0555 Obtained the patients history and performed initial exam    Disposition:  The patient was discharged to home.     Impression & Plan      Medical Decision Making:  Ron Gilmore is a 82 year old male who presents for evaluation of abdominal pain and decreased urinary output. They have a chirinos catheter in place because of chronic urinary retention and it appears it is clogged.  Differential includes new UTI, hematuria, blood clot in catheter, chirinos malposition, catheter failure, etc. The history and exam are consistent with acute urinary retention from clogged chirinos catheter. A urinalysis was obtained and was indicating infection.  Will send home with new catheter and have patient followup with urology as  needed.  Urine culture was sent.  The cause of the clogged chirinos catheter is likely the urinary tract infection.  Medications for discharge noted below.  Patient is stable for discharge home.      Diagnosis:    ICD-10-CM    1. Urinary retention  R33.9       2. Chirinos catheter problem, initial encounter (H)  T83.9XXA       3. Urinary tract infection associated with indwelling urethral catheter, initial encounter (H)  T83.511A     N39.0            Discharge Medications:  Discharge Medication List as of 3/1/2023  7:34 AM      START taking these medications    Details   cephALEXin (KEFLEX) 500 MG capsule Take 1 capsule (500 mg) by mouth 3 times daily for 7 days, Disp-21 capsule, R-0, Local Print                Scribe Disclosure:  Brandon LAUREN, am serving as a scribe at 5:56 AM on 3/1/2023 to document services personally performed by Rob Alfred MD based on my observations and the provider's statements to me.     3/1/2023   oRb Alfred MD Joing, Todd Roger, MD  03/01/23 0196

## 2023-03-02 LAB — BACTERIA UR CULT: NORMAL

## 2023-04-03 ENCOUNTER — APPOINTMENT (OUTPATIENT)
Dept: GENERAL RADIOLOGY | Facility: CLINIC | Age: 83
DRG: 871 | End: 2023-04-03
Attending: EMERGENCY MEDICINE
Payer: MEDICARE

## 2023-04-03 ENCOUNTER — APPOINTMENT (OUTPATIENT)
Dept: CT IMAGING | Facility: CLINIC | Age: 83
DRG: 871 | End: 2023-04-03
Attending: EMERGENCY MEDICINE
Payer: MEDICARE

## 2023-04-03 ENCOUNTER — HOSPITAL ENCOUNTER (INPATIENT)
Facility: CLINIC | Age: 83
LOS: 6 days | Discharge: NURSING, REHAB OR LONG-TERM CARE | DRG: 871 | End: 2023-04-09
Attending: EMERGENCY MEDICINE | Admitting: INTERNAL MEDICINE
Payer: MEDICARE

## 2023-04-03 DIAGNOSIS — J18.9 PNEUMONIA DUE TO INFECTIOUS ORGANISM, UNSPECIFIED LATERALITY, UNSPECIFIED PART OF LUNG: ICD-10-CM

## 2023-04-03 DIAGNOSIS — I50.31 ACUTE DIASTOLIC CONGESTIVE HEART FAILURE (H): ICD-10-CM

## 2023-04-03 DIAGNOSIS — J96.11 CHRONIC RESPIRATORY FAILURE WITH HYPOXIA (H): ICD-10-CM

## 2023-04-03 DIAGNOSIS — J44.9 CHRONIC OBSTRUCTIVE PULMONARY DISEASE, UNSPECIFIED COPD TYPE (H): ICD-10-CM

## 2023-04-03 DIAGNOSIS — J96.01 ACUTE RESPIRATORY FAILURE WITH HYPOXIA (H): ICD-10-CM

## 2023-04-03 DIAGNOSIS — J69.0 ASPIRATION PNEUMONIA OF BOTH LOWER LOBES DUE TO REGURGITATED FOOD (H): Primary | ICD-10-CM

## 2023-04-03 LAB
ALBUMIN SERPL BCG-MCNC: 3.2 G/DL (ref 3.5–5.2)
ALBUMIN UR-MCNC: 100 MG/DL
ALP SERPL-CCNC: 76 U/L (ref 40–129)
ALT SERPL W P-5'-P-CCNC: 17 U/L (ref 10–50)
ANION GAP SERPL CALCULATED.3IONS-SCNC: 11 MMOL/L (ref 7–15)
APPEARANCE UR: ABNORMAL
AST SERPL W P-5'-P-CCNC: 21 U/L (ref 10–50)
ATRIAL RATE - MUSE: 90 BPM
BACTERIA #/AREA URNS HPF: ABNORMAL /HPF
BASE EXCESS BLDV CALC-SCNC: 4.8 MMOL/L (ref -7.7–1.9)
BASOPHILS # BLD AUTO: 0 10E3/UL (ref 0–0.2)
BASOPHILS NFR BLD AUTO: 0 %
BILIRUB SERPL-MCNC: 0.8 MG/DL
BILIRUB UR QL STRIP: NEGATIVE
BUN SERPL-MCNC: 18.5 MG/DL (ref 8–23)
CALCIUM SERPL-MCNC: 9.2 MG/DL (ref 8.8–10.2)
CHLORIDE SERPL-SCNC: 96 MMOL/L (ref 98–107)
COLOR UR AUTO: ABNORMAL
CREAT BLD-MCNC: 1 MG/DL (ref 0.7–1.3)
CREAT SERPL-MCNC: 0.9 MG/DL (ref 0.67–1.17)
CREAT SERPL-MCNC: 0.99 MG/DL (ref 0.67–1.17)
DEPRECATED HCO3 PLAS-SCNC: 29 MMOL/L (ref 22–29)
DIASTOLIC BLOOD PRESSURE - MUSE: NORMAL MMHG
EOSINOPHIL # BLD AUTO: 0 10E3/UL (ref 0–0.7)
EOSINOPHIL NFR BLD AUTO: 0 %
ERYTHROCYTE [DISTWIDTH] IN BLOOD BY AUTOMATED COUNT: 15.9 % (ref 10–15)
FLUAV RNA SPEC QL NAA+PROBE: NEGATIVE
FLUBV RNA RESP QL NAA+PROBE: NEGATIVE
GFR SERPL CREATININE-BSD FRML MDRD: 77 ML/MIN/1.73M2
GFR SERPL CREATININE-BSD FRML MDRD: 86 ML/MIN/1.73M2
GFR SERPL CREATININE-BSD FRML MDRD: >60 ML/MIN/1.73M2
GLUCOSE BLDC GLUCOMTR-MCNC: 232 MG/DL (ref 70–99)
GLUCOSE SERPL-MCNC: 186 MG/DL (ref 70–99)
GLUCOSE UR STRIP-MCNC: NEGATIVE MG/DL
HBA1C MFR BLD: 6.9 %
HCO3 BLDV-SCNC: 32 MMOL/L (ref 21–28)
HCO3 BLDV-SCNC: 34 MMOL/L (ref 21–28)
HCT VFR BLD AUTO: 42.8 % (ref 40–53)
HGB BLD-MCNC: 13.7 G/DL (ref 13.3–17.7)
HGB UR QL STRIP: ABNORMAL
HOLD SPECIMEN: NORMAL
HOLD SPECIMEN: NORMAL
HYALINE CASTS: 13 /LPF
IMM GRANULOCYTES # BLD: 0.1 10E3/UL
IMM GRANULOCYTES NFR BLD: 0 %
INTERPRETATION ECG - MUSE: NORMAL
KETONES UR STRIP-MCNC: NEGATIVE MG/DL
LACTATE BLD-SCNC: 1.5 MMOL/L
LEUKOCYTE ESTERASE UR QL STRIP: ABNORMAL
LYMPHOCYTES # BLD AUTO: 1 10E3/UL (ref 0.8–5.3)
LYMPHOCYTES NFR BLD AUTO: 6 %
MCH RBC QN AUTO: 31.6 PG (ref 26.5–33)
MCHC RBC AUTO-ENTMCNC: 32 G/DL (ref 31.5–36.5)
MCV RBC AUTO: 99 FL (ref 78–100)
MONOCYTES # BLD AUTO: 1.6 10E3/UL (ref 0–1.3)
MONOCYTES NFR BLD AUTO: 11 %
MUCOUS THREADS #/AREA URNS LPF: PRESENT /LPF
NEUTROPHILS # BLD AUTO: 12.5 10E3/UL (ref 1.6–8.3)
NEUTROPHILS NFR BLD AUTO: 83 %
NITRATE UR QL: NEGATIVE
NRBC # BLD AUTO: 0 10E3/UL
NRBC BLD AUTO-RTO: 0 /100
NT-PROBNP SERPL-MCNC: 903 PG/ML (ref 0–1800)
O2/TOTAL GAS SETTING VFR VENT: 80 %
OXYHGB MFR BLDV: 92 % (ref 70–75)
P AXIS - MUSE: 76 DEGREES
PCO2 BLDV: 55 MM HG (ref 40–50)
PCO2 BLDV: 57 MM HG (ref 40–50)
PH BLDV: 7.37 [PH] (ref 7.32–7.43)
PH BLDV: 7.39 [PH] (ref 7.32–7.43)
PH UR STRIP: 6.5 [PH] (ref 5–7)
PLATELET # BLD AUTO: 175 10E3/UL (ref 150–450)
PO2 BLDV: 59 MM HG (ref 25–47)
PO2 BLDV: 68 MM HG (ref 25–47)
POTASSIUM SERPL-SCNC: 4.5 MMOL/L (ref 3.4–5.3)
PR INTERVAL - MUSE: 166 MS
PROT SERPL-MCNC: 7.3 G/DL (ref 6.4–8.3)
QRS DURATION - MUSE: 130 MS
QT - MUSE: 394 MS
QTC - MUSE: 481 MS
R AXIS - MUSE: 18 DEGREES
RBC # BLD AUTO: 4.34 10E6/UL (ref 4.4–5.9)
RBC URINE: 52 /HPF
RSV RNA SPEC NAA+PROBE: NEGATIVE
SAO2 % BLDV: 89 % (ref 94–100)
SARS-COV-2 RNA RESP QL NAA+PROBE: NEGATIVE
SODIUM SERPL-SCNC: 136 MMOL/L (ref 136–145)
SP GR UR STRIP: 1.03 (ref 1–1.03)
SYSTOLIC BLOOD PRESSURE - MUSE: NORMAL MMHG
T AXIS - MUSE: 35 DEGREES
TROPONIN T SERPL HS-MCNC: 21 NG/L
TROPONIN T SERPL HS-MCNC: 24 NG/L
TROPONIN T SERPL HS-MCNC: 27 NG/L
UROBILINOGEN UR STRIP-MCNC: NORMAL MG/DL
VENTRICULAR RATE- MUSE: 90 BPM
WBC # BLD AUTO: 15.2 10E3/UL (ref 4–11)
WBC CLUMPS #/AREA URNS HPF: PRESENT /HPF
WBC URINE: >182 /HPF

## 2023-04-03 PROCEDURE — 36415 COLL VENOUS BLD VENIPUNCTURE: CPT | Performed by: INTERNAL MEDICINE

## 2023-04-03 PROCEDURE — 258N000003 HC RX IP 258 OP 636: Performed by: EMERGENCY MEDICINE

## 2023-04-03 PROCEDURE — 84484 ASSAY OF TROPONIN QUANT: CPT | Performed by: INTERNAL MEDICINE

## 2023-04-03 PROCEDURE — 87040 BLOOD CULTURE FOR BACTERIA: CPT | Performed by: EMERGENCY MEDICINE

## 2023-04-03 PROCEDURE — 93005 ELECTROCARDIOGRAM TRACING: CPT

## 2023-04-03 PROCEDURE — 120N000013 HC R&B IMCU

## 2023-04-03 PROCEDURE — 250N000009 HC RX 250: Performed by: INTERNAL MEDICINE

## 2023-04-03 PROCEDURE — 83605 ASSAY OF LACTIC ACID: CPT

## 2023-04-03 PROCEDURE — 999N000157 HC STATISTIC RCP TIME EA 10 MIN

## 2023-04-03 PROCEDURE — 250N000013 HC RX MED GY IP 250 OP 250 PS 637: Performed by: INTERNAL MEDICINE

## 2023-04-03 PROCEDURE — 83880 ASSAY OF NATRIURETIC PEPTIDE: CPT | Performed by: EMERGENCY MEDICINE

## 2023-04-03 PROCEDURE — 82565 ASSAY OF CREATININE: CPT | Performed by: INTERNAL MEDICINE

## 2023-04-03 PROCEDURE — 81001 URINALYSIS AUTO W/SCOPE: CPT | Performed by: EMERGENCY MEDICINE

## 2023-04-03 PROCEDURE — 250N000011 HC RX IP 250 OP 636: Performed by: EMERGENCY MEDICINE

## 2023-04-03 PROCEDURE — 82805 BLOOD GASES W/O2 SATURATION: CPT | Performed by: INTERNAL MEDICINE

## 2023-04-03 PROCEDURE — 83036 HEMOGLOBIN GLYCOSYLATED A1C: CPT | Performed by: INTERNAL MEDICINE

## 2023-04-03 PROCEDURE — 250N000011 HC RX IP 250 OP 636: Performed by: INTERNAL MEDICINE

## 2023-04-03 PROCEDURE — 96365 THER/PROPH/DIAG IV INF INIT: CPT

## 2023-04-03 PROCEDURE — 999N000127 HC STATISTIC PERIPHERAL IV START W US GUIDANCE

## 2023-04-03 PROCEDURE — G1010 CDSM STANSON: HCPCS

## 2023-04-03 PROCEDURE — 36415 COLL VENOUS BLD VENIPUNCTURE: CPT | Performed by: EMERGENCY MEDICINE

## 2023-04-03 PROCEDURE — 80053 COMPREHEN METABOLIC PANEL: CPT | Performed by: EMERGENCY MEDICINE

## 2023-04-03 PROCEDURE — 5A09357 ASSISTANCE WITH RESPIRATORY VENTILATION, LESS THAN 24 CONSECUTIVE HOURS, CONTINUOUS POSITIVE AIRWAY PRESSURE: ICD-10-PCS | Performed by: EMERGENCY MEDICINE

## 2023-04-03 PROCEDURE — 250N000009 HC RX 250: Performed by: EMERGENCY MEDICINE

## 2023-04-03 PROCEDURE — 87637 SARSCOV2&INF A&B&RSV AMP PRB: CPT | Performed by: EMERGENCY MEDICINE

## 2023-04-03 PROCEDURE — 84484 ASSAY OF TROPONIN QUANT: CPT | Performed by: EMERGENCY MEDICINE

## 2023-04-03 PROCEDURE — 94640 AIRWAY INHALATION TREATMENT: CPT

## 2023-04-03 PROCEDURE — 71045 X-RAY EXAM CHEST 1 VIEW: CPT

## 2023-04-03 PROCEDURE — 99291 CRITICAL CARE FIRST HOUR: CPT | Mod: 25,CS

## 2023-04-03 PROCEDURE — C9803 HOPD COVID-19 SPEC COLLECT: HCPCS

## 2023-04-03 PROCEDURE — 96375 TX/PRO/DX INJ NEW DRUG ADDON: CPT

## 2023-04-03 PROCEDURE — 85025 COMPLETE CBC W/AUTO DIFF WBC: CPT | Performed by: EMERGENCY MEDICINE

## 2023-04-03 PROCEDURE — 82565 ASSAY OF CREATININE: CPT

## 2023-04-03 PROCEDURE — 94640 AIRWAY INHALATION TREATMENT: CPT | Mod: 76

## 2023-04-03 PROCEDURE — 99223 1ST HOSP IP/OBS HIGH 75: CPT | Mod: AI | Performed by: INTERNAL MEDICINE

## 2023-04-03 PROCEDURE — 87086 URINE CULTURE/COLONY COUNT: CPT | Performed by: EMERGENCY MEDICINE

## 2023-04-03 RX ORDER — IPRATROPIUM BROMIDE AND ALBUTEROL SULFATE 2.5; .5 MG/3ML; MG/3ML
1 SOLUTION RESPIRATORY (INHALATION) DAILY PRN
Status: DISCONTINUED | OUTPATIENT
Start: 2023-04-03 | End: 2023-04-09 | Stop reason: HOSPADM

## 2023-04-03 RX ORDER — SIMETHICONE 125 MG
125 TABLET,CHEWABLE ORAL 3 TIMES DAILY PRN
Status: DISCONTINUED | OUTPATIENT
Start: 2023-04-03 | End: 2023-04-09 | Stop reason: HOSPADM

## 2023-04-03 RX ORDER — AMMONIUM LACTATE 12 G/100G
LOTION TOPICAL DAILY
Status: DISCONTINUED | OUTPATIENT
Start: 2023-04-04 | End: 2023-04-09 | Stop reason: HOSPADM

## 2023-04-03 RX ORDER — PAROXETINE 40 MG/1
40 TABLET, FILM COATED ORAL EVERY MORNING
Status: DISCONTINUED | OUTPATIENT
Start: 2023-04-04 | End: 2023-04-03

## 2023-04-03 RX ORDER — PAROXETINE 10 MG/1
10 TABLET, FILM COATED ORAL EVERY MORNING
Status: DISCONTINUED | OUTPATIENT
Start: 2023-04-04 | End: 2023-04-03

## 2023-04-03 RX ORDER — AZITHROMYCIN 250 MG/1
250 TABLET, FILM COATED ORAL DAILY
Status: COMPLETED | OUTPATIENT
Start: 2023-04-04 | End: 2023-04-07

## 2023-04-03 RX ORDER — AMOXICILLIN 250 MG
1 CAPSULE ORAL 2 TIMES DAILY
COMMUNITY

## 2023-04-03 RX ORDER — ATORVASTATIN CALCIUM 10 MG/1
10 TABLET, FILM COATED ORAL EVERY MORNING
Status: DISCONTINUED | OUTPATIENT
Start: 2023-04-04 | End: 2023-04-09 | Stop reason: HOSPADM

## 2023-04-03 RX ORDER — IPRATROPIUM BROMIDE AND ALBUTEROL SULFATE 2.5; .5 MG/3ML; MG/3ML
3 SOLUTION RESPIRATORY (INHALATION) ONCE
Status: COMPLETED | OUTPATIENT
Start: 2023-04-03 | End: 2023-04-03

## 2023-04-03 RX ORDER — PANTOPRAZOLE SODIUM 40 MG/1
40 TABLET, DELAYED RELEASE ORAL 2 TIMES DAILY
Status: DISCONTINUED | OUTPATIENT
Start: 2023-04-03 | End: 2023-04-09 | Stop reason: HOSPADM

## 2023-04-03 RX ORDER — DEXTROSE MONOHYDRATE 25 G/50ML
25-50 INJECTION, SOLUTION INTRAVENOUS
Status: DISCONTINUED | OUTPATIENT
Start: 2023-04-03 | End: 2023-04-09 | Stop reason: HOSPADM

## 2023-04-03 RX ORDER — PIPERACILLIN SODIUM, TAZOBACTAM SODIUM 4; .5 G/20ML; G/20ML
4.5 INJECTION, POWDER, LYOPHILIZED, FOR SOLUTION INTRAVENOUS ONCE
Status: COMPLETED | OUTPATIENT
Start: 2023-04-03 | End: 2023-04-03

## 2023-04-03 RX ORDER — LOPERAMIDE HCL 2 MG
2 CAPSULE ORAL EVERY 6 HOURS PRN
COMMUNITY

## 2023-04-03 RX ORDER — POLYETHYLENE GLYCOL 3350 17 G/17G
17 POWDER, FOR SOLUTION ORAL DAILY PRN
Status: DISCONTINUED | OUTPATIENT
Start: 2023-04-03 | End: 2023-04-09 | Stop reason: HOSPADM

## 2023-04-03 RX ORDER — ACETAMINOPHEN 650 MG/1
650 SUPPOSITORY RECTAL EVERY 6 HOURS PRN
Status: DISCONTINUED | OUTPATIENT
Start: 2023-04-03 | End: 2023-04-09 | Stop reason: HOSPADM

## 2023-04-03 RX ORDER — PIPERACILLIN SODIUM, TAZOBACTAM SODIUM 3; .375 G/15ML; G/15ML
3.38 INJECTION, POWDER, LYOPHILIZED, FOR SOLUTION INTRAVENOUS EVERY 6 HOURS
Status: DISCONTINUED | OUTPATIENT
Start: 2023-04-03 | End: 2023-04-09 | Stop reason: HOSPADM

## 2023-04-03 RX ORDER — ALLOPURINOL 300 MG/1
300 TABLET ORAL EVERY MORNING
Status: DISCONTINUED | OUTPATIENT
Start: 2023-04-04 | End: 2023-04-09 | Stop reason: HOSPADM

## 2023-04-03 RX ORDER — ASPIRIN 81 MG/1
81 TABLET ORAL DAILY
Status: DISCONTINUED | OUTPATIENT
Start: 2023-04-04 | End: 2023-04-09 | Stop reason: HOSPADM

## 2023-04-03 RX ORDER — GLIPIZIDE 10 MG/1
1 TABLET ORAL 2 TIMES DAILY PRN
Status: DISCONTINUED | OUTPATIENT
Start: 2023-04-03 | End: 2023-04-09 | Stop reason: HOSPADM

## 2023-04-03 RX ORDER — LIDOCAINE 40 MG/G
CREAM TOPICAL
Status: DISCONTINUED | OUTPATIENT
Start: 2023-04-03 | End: 2023-04-03

## 2023-04-03 RX ORDER — IPRATROPIUM BROMIDE AND ALBUTEROL SULFATE 2.5; .5 MG/3ML; MG/3ML
1 SOLUTION RESPIRATORY (INHALATION) 3 TIMES DAILY PRN
COMMUNITY

## 2023-04-03 RX ORDER — LEVALBUTEROL INHALATION SOLUTION 1.25 MG/3ML
1.25 SOLUTION RESPIRATORY (INHALATION) 3 TIMES DAILY
Status: DISCONTINUED | OUTPATIENT
Start: 2023-04-03 | End: 2023-04-09 | Stop reason: HOSPADM

## 2023-04-03 RX ORDER — AMOXICILLIN 250 MG
1 CAPSULE ORAL DAILY PRN
Status: DISCONTINUED | OUTPATIENT
Start: 2023-04-03 | End: 2023-04-09 | Stop reason: HOSPADM

## 2023-04-03 RX ORDER — METHYLPREDNISOLONE SODIUM SUCCINATE 125 MG/2ML
125 INJECTION, POWDER, LYOPHILIZED, FOR SOLUTION INTRAMUSCULAR; INTRAVENOUS ONCE
Status: COMPLETED | OUTPATIENT
Start: 2023-04-03 | End: 2023-04-03

## 2023-04-03 RX ORDER — NICOTINE POLACRILEX 4 MG
15-30 LOZENGE BUCCAL
Status: DISCONTINUED | OUTPATIENT
Start: 2023-04-03 | End: 2023-04-09 | Stop reason: HOSPADM

## 2023-04-03 RX ORDER — LIDOCAINE 40 MG/G
CREAM TOPICAL
Status: DISCONTINUED | OUTPATIENT
Start: 2023-04-03 | End: 2023-04-09 | Stop reason: HOSPADM

## 2023-04-03 RX ORDER — ONDANSETRON 2 MG/ML
4 INJECTION INTRAMUSCULAR; INTRAVENOUS EVERY 6 HOURS PRN
Status: DISCONTINUED | OUTPATIENT
Start: 2023-04-03 | End: 2023-04-09 | Stop reason: HOSPADM

## 2023-04-03 RX ORDER — AMOXICILLIN 250 MG
1 CAPSULE ORAL DAILY PRN
COMMUNITY

## 2023-04-03 RX ORDER — AZITHROMYCIN 500 MG/1
500 INJECTION, POWDER, LYOPHILIZED, FOR SOLUTION INTRAVENOUS ONCE
Status: COMPLETED | OUTPATIENT
Start: 2023-04-03 | End: 2023-04-03

## 2023-04-03 RX ORDER — IPRATROPIUM BROMIDE AND ALBUTEROL SULFATE 2.5; .5 MG/3ML; MG/3ML
1 SOLUTION RESPIRATORY (INHALATION) 3 TIMES DAILY
Status: DISCONTINUED | OUTPATIENT
Start: 2023-04-03 | End: 2023-04-03

## 2023-04-03 RX ORDER — ENOXAPARIN SODIUM 100 MG/ML
40 INJECTION SUBCUTANEOUS EVERY 24 HOURS
Status: DISCONTINUED | OUTPATIENT
Start: 2023-04-03 | End: 2023-04-09 | Stop reason: HOSPADM

## 2023-04-03 RX ORDER — NITROGLYCERIN 0.4 MG/1
0.4 TABLET SUBLINGUAL EVERY 5 MIN PRN
Status: DISCONTINUED | OUTPATIENT
Start: 2023-04-03 | End: 2023-04-09 | Stop reason: HOSPADM

## 2023-04-03 RX ORDER — ONDANSETRON 4 MG/1
4 TABLET, ORALLY DISINTEGRATING ORAL EVERY 6 HOURS PRN
Status: DISCONTINUED | OUTPATIENT
Start: 2023-04-03 | End: 2023-04-09 | Stop reason: HOSPADM

## 2023-04-03 RX ORDER — IOPAMIDOL 755 MG/ML
79 INJECTION, SOLUTION INTRAVASCULAR ONCE
Status: COMPLETED | OUTPATIENT
Start: 2023-04-03 | End: 2023-04-03

## 2023-04-03 RX ORDER — ACETAMINOPHEN 325 MG/1
650 TABLET ORAL EVERY 6 HOURS PRN
Status: DISCONTINUED | OUTPATIENT
Start: 2023-04-03 | End: 2023-04-09 | Stop reason: HOSPADM

## 2023-04-03 RX ORDER — IPRATROPIUM BROMIDE AND ALBUTEROL SULFATE 2.5; .5 MG/3ML; MG/3ML
1 SOLUTION RESPIRATORY (INHALATION) 3 TIMES DAILY
COMMUNITY
End: 2024-08-20

## 2023-04-03 RX ADMIN — UMECLIDINIUM BROMIDE AND VILANTEROL TRIFENATATE 1 PUFF: 62.5; 25 POWDER RESPIRATORY (INHALATION) at 22:25

## 2023-04-03 RX ADMIN — IOPAMIDOL 79 ML: 755 INJECTION, SOLUTION INTRAVENOUS at 11:53

## 2023-04-03 RX ADMIN — SODIUM CHLORIDE 100 ML: 900 INJECTION INTRAVENOUS at 11:53

## 2023-04-03 RX ADMIN — ENOXAPARIN SODIUM 40 MG: 40 INJECTION SUBCUTANEOUS at 18:29

## 2023-04-03 RX ADMIN — AZITHROMYCIN MONOHYDRATE 500 MG: 500 INJECTION, POWDER, LYOPHILIZED, FOR SOLUTION INTRAVENOUS at 13:42

## 2023-04-03 RX ADMIN — METHYLPREDNISOLONE SODIUM SUCCINATE 125 MG: 125 INJECTION, POWDER, FOR SOLUTION INTRAMUSCULAR; INTRAVENOUS at 11:43

## 2023-04-03 RX ADMIN — IPRATROPIUM BROMIDE AND ALBUTEROL SULFATE 3 ML: .5; 3 SOLUTION RESPIRATORY (INHALATION) at 12:09

## 2023-04-03 RX ADMIN — PIPERACILLIN AND TAZOBACTAM 4.5 G: 4; .5 INJECTION, POWDER, FOR SOLUTION INTRAVENOUS at 13:03

## 2023-04-03 RX ADMIN — IPRATROPIUM BROMIDE 0.5 MG: 0.5 SOLUTION RESPIRATORY (INHALATION) at 20:49

## 2023-04-03 RX ADMIN — LEVALBUTEROL HYDROCHLORIDE 1.25 MG: 1.25 SOLUTION RESPIRATORY (INHALATION) at 20:49

## 2023-04-03 RX ADMIN — PANTOPRAZOLE SODIUM 40 MG: 40 TABLET, DELAYED RELEASE ORAL at 20:06

## 2023-04-03 RX ADMIN — METOPROLOL TARTRATE 12.5 MG: 25 TABLET, FILM COATED ORAL at 20:34

## 2023-04-03 RX ADMIN — IPRATROPIUM BROMIDE AND ALBUTEROL SULFATE 6 ML: .5; 3 SOLUTION RESPIRATORY (INHALATION) at 12:07

## 2023-04-03 RX ADMIN — SODIUM CHLORIDE 1000 ML: 9 INJECTION, SOLUTION INTRAVENOUS at 13:03

## 2023-04-03 ASSESSMENT — ACTIVITIES OF DAILY LIVING (ADL)
ADLS_ACUITY_SCORE: 35
ADLS_ACUITY_SCORE: 37
ADLS_ACUITY_SCORE: 35

## 2023-04-03 ASSESSMENT — ENCOUNTER SYMPTOMS
DYSURIA: 0
ABDOMINAL PAIN: 0
FEVER: 0
SHORTNESS OF BREATH: 0
CHILLS: 0
COUGH: 1
WHEEZING: 0

## 2023-04-03 NOTE — ED NOTES
Bedside rounding performed. Patient A/O x4, tolerating Bipap well. Reports thirst; IVF bag (1L) completed after abx. Pt reports he has decubitus ulcers on his buttocks, writer informed him we will plan for repositioning side to side q2h as tolerated. Pt/Family (step dtr) in agreement.

## 2023-04-03 NOTE — ED PROVIDER NOTES
History     Chief Complaint:  Shortness of Breath       HPI   Ron Gilmore is a 82 year old male who presents for evaluation of hypoxia.  He arrives via EMS.  EMS reports that the nursing home staff states that he vomited this morning but the patient cannot quite remember if he did or not.  EMS staff noted that the staff at his facility found him hypoxic on his baseline 2 L and called EMS.  He was hypoxic on for EMS and requiring 6 L nasal cannula on transport here.  The patient states he been coughing since this morning.  He denies any chest pain, fever, or chills.  He does not feel nauseous and has no abdominal pain.  He has not heard any wheezing.  No known sick contacts      Independent Historian:   EMS - They report EMS    Review of External Notes:      ROS:  Review of Systems   Constitutional: Negative for chills and fever.   Respiratory: Positive for cough. Negative for shortness of breath and wheezing.    Cardiovascular: Negative for chest pain and leg swelling.   Gastrointestinal: Negative for abdominal pain.   Genitourinary: Negative for dysuria.   All other systems reviewed and are negative.      Allergies:  No Known Allergies     Medications:    acetaminophen (TYLENOL) 325 MG tablet  allopurinol (ZYLOPRIM) 300 MG tablet  ammonium lactate (LAC-HYDRIN) 12 % external lotion  aspirin (ASA) 81 MG EC tablet  atorvastatin (LIPITOR) 10 MG tablet  cyanocobalamin (VITAMIN B-12) 500 MCG tablet  Emollient (AMLACTIN ULTRA EX)  furosemide (LASIX) 40 MG tablet  hypromellose (ARTIFICIAL TEARS) 0.5 % SOLN ophthalmic solution  hypromellose (ARTIFICIAL TEARS) 0.5 % SOLN ophthalmic solution  ipratropium - albuterol 0.5 mg/2.5 mg/3 mL (DUONEB) 0.5-2.5 (3) MG/3ML neb solution  ipratropium - albuterol 0.5 mg/2.5 mg/3 mL (DUONEB) 0.5-2.5 (3) MG/3ML neb solution  ipratropium-albuterol (COMBIVENT RESPIMAT)  MCG/ACT inhaler  loperamide (IMODIUM) 2 MG capsule  methenamine hippurate (HIPREX) 1 g tablet  metoprolol tartrate  (LOPRESSOR) 25 MG tablet  pantoprazole (PROTONIX) 40 MG EC tablet  PARoxetine (PAXIL) 10 MG tablet  PARoxetine (PAXIL) 40 MG tablet  polyethylene glycol (MIRALAX) 17 GM/Dose powder  senna-docusate (SENOKOT-S/PERICOLACE) 8.6-50 MG tablet  senna-docusate (SENOKOT-S/PERICOLACE) 8.6-50 MG tablet  simethicone (MYLICON) 125 MG chewable tablet  Skin Protectants, Misc. (LANTISEPTIC SKIN PROTECTANT EX)  Skin Protectants, Misc. (LANTISEPTIC SKIN PROTECTANT EX)        Past Medical History:    Past Medical History:   Diagnosis Date     BPH (benign prostatic hyperplasia)      Cataract      Cholelithiasis      COPD (chronic obstructive pulmonary disease) (H)      Depression      Diabetes mellitus      Dyshidrotic foot dermatitis      Edema      Gout      Hyperlipidemia      Hypertension      Infection due to 2019 novel coronavirus 5/1/2021     Kidney stones      Lumbago      Lumbar disc displacement without myelopathy      Muscle weakness      Neuropathy, diabetic (H)      Obesity      Spinal stenosis      Stroke (H)      Unsteady gait      Urinary retention with incomplete bladder emptying      UTI (urinary tract infection)      Vasovagal episode        Past Surgical History:    Past Surgical History:   Procedure Laterality Date     APPENDECTOMY OPEN       ARTHROSCOPY SHOULDER ROTATOR CUFF REPAIR       cataracts Bilateral      CHOLECYSTECTOMY       COLONOSCOPY  1986     COLONOSCOPY N/A 5/29/2021    Procedure: COLONOSCOPY;  Surgeon: Kofi Davis MD;  Location:  GI     CYSTOSCOPY  10/19/2011    Procedure:CYSTOSCOPY; CYSTOSCOPY, BLADDER STONE REMOVAL; Surgeon:ROB SAWYER; Location: OR     CYSTOSCOPY, TRANSURETHRAL RESECTION (TUR) PROSTATE, COMBINED N/A 2/21/2018    Procedure: COMBINED CYSTOSCOPY, TRANSURETHRAL RESECTION (TUR) PROSTATE;  COMBINED CYSTOSCOPY, TRANSURETHRAL RESECTION (TUR) PROSTATE ;  Surgeon: Rob Sawyer MD;  Location:  OR     EP LOOP RECORDER IMPLANT N/A 1/20/2020    Procedure: EP Loop  Recorder Implant;  Surgeon: Evgeny Parisi MD;  Location:  [facility]     ESOPHAGOSCOPY, GASTROSCOPY, DUODENOSCOPY (EGD), COMBINED N/A 5/28/2021    Procedure: ESOPHAGOGASTRODUODENOSCOPY (EGD);  Surgeon: Aurora Waterman MD;  Location:  GI     ESOPHAGOSCOPY, GASTROSCOPY, DUODENOSCOPY (EGD), DILATATION, COMBINED N/A 9/24/2022    Procedure: ESOPHAGOGASTRODUODENOSCOPY, WITH DILATION;  Surgeon: Kofi Davis MD;  Location:  GI     EYE SURGERY      right lid surgery      IR IVC FILTER PLACEMENT  5/24/2021     IR NEPHROSTOMY TUBE PLACEMENT RIGHT  3/9/2021     IR URETERAL STENT PLACEMENT RIGHT  3/16/2021     JOINT REPLACEMENT Right     HIP     KNEE SURGERY Bilateral      LAMINECTOMY LUMBAR ONE LEVEL       LASER HOLMIUM LITHOTRIPSY URETER(S), INSERT STENT, COMBINED Right 4/14/2021    Procedure: CYSTOSCOPY, BLADDER STONE REMOVAL, RIGHT URETEROSCOPY, HOLMIUM LASER LITHOTRIPSY, AND RIGHT STENT REMOVAL, RIGHT RETROGRADE;  Surgeon: Rob Sullivan MD;  Location:  OR     TONSILLECTOMY          Family History:    family history includes Prostate Cancer in his father.    Social History:   reports that he has quit smoking. He has never used smokeless tobacco. He reports that he does not drink alcohol and does not use drugs.  PCP: Kalen Metcalf     Physical Exam     Patient Vitals for the past 24 hrs:   BP Temp Temp src Pulse Resp SpO2   04/03/23 1504 -- -- -- -- -- 96 %   04/03/23 1500 129/82 -- -- 88 -- --   04/03/23 1448 -- -- -- -- -- 92 %   04/03/23 1430 121/74 -- -- 89 -- 94 %   04/03/23 1417 -- -- -- -- -- 92 %   04/03/23 1410 -- -- -- -- -- 90 %   04/03/23 1409 -- -- -- -- -- (!) 89 %   04/03/23 1408 -- -- -- -- -- (!) 88 %   04/03/23 1406 -- -- -- -- -- 90 %   04/03/23 1400 138/77 -- -- 92 15 92 %   04/03/23 1330 133/86 -- -- 93 16 95 %   04/03/23 1300 (!) 142/89 -- -- 88 15 96 %   04/03/23 1230 94/77 -- -- 87 16 98 %   04/03/23 1215 -- -- -- 87 15 98 %   04/03/23 1200 132/72 -- -- -- --  --   04/03/23 1133 -- 98.4  F (36.9  C) Temporal -- 22 --   04/03/23 1130 130/89 -- -- 93 -- 96 %   04/03/23 1106 129/74 -- -- -- -- 95 %   04/03/23 1051 -- -- -- -- -- 92 %   04/03/23 1036 (!) 141/81 -- -- -- -- --        Physical Exam  Constitutional: Well appearing.  HEENT: Atraumatic.    Moist mucous membranes.  Neck: Soft.  Supple.  No JVD.  Cardiac: Regular rate and rhythm.  No murmur or rub.  Respiratory: Exhibits mild tachypnea.  No wheezing noted.  He does have some faint rhonchi in the lower lung fields  Abdomen: Soft and nontender.   Nondistended.  Musculoskeletal: No edema.  Normal range of motion.  Neurologic: Alert and oriented x3.  Normal tone and bulk.   Skin: No rashes.  No edema.  Psych: Normal affect.  Normal behavior.      Emergency Department Course     ECG results from 04/03/23   EKG 12-lead, tracing only     Value    Systolic Blood Pressure     Diastolic Blood Pressure     Ventricular Rate 90    Atrial Rate 90    CO Interval 166    QRS Duration 130        QTc 481    P Axis 76    R AXIS 18    T Axis 35    Interpretation ECG      Sinus rhythm  Right bundle branch block  Abnormal ECG  When compared with ECG of 30-JAN-2023 15:18,  T wave inversion no longer evident in Anterior leads  Confirmed by GENERATED REPORT, COMPUTER ([id number]),  Shannon Mejia ([id number]) on 4/3/2023 11:19:38 AM             Imaging:  CT Chest Pulmonary Embolism w Contrast   Final Result   IMPRESSION:   1.  No pulmonary embolism demonstrated.   2.  Nonspecific mild adenopathy.   3.  Bibasilar atelectasis and/or infiltrate left worse than right.      BRISSA MENESES MD            SYSTEM ID:  [id number]      XR Chest Port 1 View   Final Result   IMPRESSION: No acute disease.      BRISSA MENESES MD            SYSTEM ID:  [id number]         Report per radiology    Laboratory:  Labs Ordered and Resulted from Time of ED Arrival to Time of ED Departure   COMPREHENSIVE METABOLIC PANEL - Abnormal       Result Value    Sodium 136       Potassium 4.5      Chloride 96 (*)     Carbon Dioxide (CO2) 29      Anion Gap 11      Urea Nitrogen 18.5      Creatinine 0.99      Calcium 9.2      Glucose 186 (*)     Alkaline Phosphatase 76      AST 21      ALT 17      Protein Total 7.3      Albumin 3.2 (*)     Bilirubin Total 0.8      GFR Estimate 77     TROPONIN T, HIGH SENSITIVITY - Abnormal    Troponin T, High Sensitivity 27 (*)    ROUTINE UA WITH MICROSCOPIC REFLEX TO CULTURE - Abnormal    Color Urine Orange (*)     Appearance Urine Cloudy (*)     Glucose Urine Negative      Bilirubin Urine Negative      Ketones Urine Negative      Specific Gravity Urine 1.027      Blood Urine Small (*)     pH Urine 6.5      Protein Albumin Urine 100 (*)     Urobilinogen Urine Normal      Nitrite Urine Negative      Leukocyte Esterase Urine Large (*)     Bacteria Urine Few (*)     WBC Clumps Urine Present (*)     Mucus Urine Present (*)     RBC Urine 52 (*)     WBC Urine >182 (*)     Hyaline Casts Urine 13 (*)    CBC WITH PLATELETS AND DIFFERENTIAL - Abnormal    WBC Count 15.2 (*)     RBC Count 4.34 (*)     Hemoglobin 13.7      Hematocrit 42.8      MCV 99      MCH 31.6      MCHC 32.0      RDW 15.9 (*)     Platelet Count 175      % Neutrophils 83      % Lymphocytes 6      % Monocytes 11      % Eosinophils 0      % Basophils 0      % Immature Granulocytes 0      NRBCs per 100 WBC 0      Absolute Neutrophils 12.5 (*)     Absolute Lymphocytes 1.0      Absolute Monocytes 1.6 (*)     Absolute Eosinophils 0.0      Absolute Basophils 0.0      Absolute Immature Granulocytes 0.1      Absolute NRBCs 0.0     ISTAT GASES LACTATE VENOUS POCT - Abnormal    Lactic Acid POCT 1.5      Bicarbonate Venous POCT 34 (*)     O2 Sat, Venous POCT 89 (*)     pCO2V Venous POCT 57 (*)     pH Venous POCT 7.39      pO2 Venous POCT 59 (*)    HEMOGLOBIN A1C - Abnormal    Hemoglobin A1C 6.9 (*)    TROPONIN T, HIGH SENSITIVITY - Abnormal    Troponin T, High Sensitivity 24 (*)    NT PROBNP INPATIENT - Normal     N terminal Pro BNP Inpatient 903     INFLUENZA A/B, RSV, & SARS-COV2 PCR - Normal    Influenza A PCR Negative      Influenza B PCR Negative      RSV PCR Negative      SARS CoV2 PCR Negative     ISTAT CREATININE POCT - Normal    Creatinine POCT 1.0      GFR, ESTIMATED POCT >60     CREATININE - Normal    Creatinine 0.90      GFR Estimate 86     BLOOD GAS ARTERIAL WITH OXYHEMOGLOBIN   GLUCOSE MONITOR NURSING POCT   GLUCOSE MONITOR NURSING POCT   GLUCOSE MONITOR NURSING POCT   GLUCOSE MONITOR NURSING POCT   TROPONIN T, HIGH SENSITIVITY   ISTAT CREATININE POCT   BLOOD CULTURE   BLOOD CULTURE   URINE CULTURE        Procedures       Emergency Department Course & Assessments:             Interventions:  Medications   allopurinol (ZYLOPRIM) tablet 300 mg (has no administration in time range)   ammonium lactate (LAC-HYDRIN) 12 % lotion (has no administration in time range)   aspirin EC tablet 81 mg (has no administration in time range)   atorvastatin (LIPITOR) tablet 10 mg (has no administration in time range)   hypromellose (ARTIFICIAL TEARS) 0.5 % ophthalmic solution 1 drop (has no administration in time range)   ipratropium - albuterol 0.5 mg/2.5 mg/3 mL (DUONEB) neb solution 3 mL (has no administration in time range)   ipratropium - albuterol 0.5 mg/2.5 mg/3 mL (DUONEB) neb solution 3 mL (has no administration in time range)   ipratropium-albuterol (COMBIVENT RESPIMAT) inhaler 1 puff (has no administration in time range)   metoprolol tartrate (LOPRESSOR) half-tab 12.5 mg (has no administration in time range)   pantoprazole (PROTONIX) EC tablet 40 mg (has no administration in time range)   PARoxetine (PAXIL) tablet 10 mg (has no administration in time range)   PARoxetine (PAXIL) tablet 40 mg (has no administration in time range)   senna-docusate (SENOKOT-S/PERICOLACE) 8.6-50 MG per tablet 1 tablet (has no administration in time range)   simethicone (MYLICON) chewable tablet 125 mg (has no administration in time range)    lidocaine 1 % 0.1-1 mL (has no administration in time range)   lidocaine (LMX4) cream (has no administration in time range)   sodium chloride (PF) 0.9% PF flush 3 mL (has no administration in time range)   sodium chloride (PF) 0.9% PF flush 3 mL (has no administration in time range)   acetaminophen (TYLENOL) tablet 650 mg (has no administration in time range)     Or   acetaminophen (TYLENOL) Suppository 650 mg (has no administration in time range)   melatonin tablet 1 mg (has no administration in time range)   polyethylene glycol (MIRALAX) Packet 17 g (has no administration in time range)   ondansetron (ZOFRAN ODT) ODT tab 4 mg (has no administration in time range)     Or   ondansetron (ZOFRAN) injection 4 mg (has no administration in time range)   glucose gel 15-30 g (has no administration in time range)     Or   dextrose 50 % injection 25-50 mL (has no administration in time range)     Or   glucagon injection 1 mg (has no administration in time range)   enoxaparin ANTICOAGULANT (LOVENOX) injection 40 mg (has no administration in time range)   insulin aspart (NovoLOG) injection (RAPID ACTING) (has no administration in time range)   insulin aspart (NovoLOG) injection (RAPID ACTING) (has no administration in time range)   piperacillin-tazobactam (ZOSYN) 3.375 g vial to attach to  mL bag (has no administration in time range)   azithromycin (ZITHROMAX) tablet 250 mg (has no administration in time range)   lidocaine 1 % 0.1-1 mL (has no administration in time range)   lidocaine (LMX4) cream (has no administration in time range)   sodium chloride (PF) 0.9% PF flush 3 mL (has no administration in time range)   sodium chloride (PF) 0.9% PF flush 3 mL (has no administration in time range)   nitroGLYcerin (NITROSTAT) sublingual tablet 0.4 mg (has no administration in time range)   ipratropium - albuterol 0.5 mg/2.5 mg/3 mL (DUONEB) neb solution 3 mL (3 mLs Nebulization $Given 4/3/23 8331)   ipratropium - albuterol  0.5 mg/2.5 mg/3 mL (DUONEB) neb solution 3 mL (6 mLs Nebulization $Given 4/3/23 1207)   methylPREDNISolone sodium succinate (solu-MEDROL) injection 125 mg (125 mg Intravenous $Given 4/3/23 1143)   Saline flush (100 mLs Intravenous $Given 4/3/23 1153)   iopamidol (ISOVUE-370) solution 79 mL (79 mLs Intravenous $Given 4/3/23 1153)   piperacillin-tazobactam (ZOSYN) 4.5 g vial to attach to  mL bag (0 g Intravenous Stopped 4/3/23 1340)   azithromycin (ZITHROMAX) 500 mg vial to attach to  mL bag (500 mg Intravenous $New Bag 4/3/23 1342)   0.9% sodium chloride BOLUS (0 mLs Intravenous Stopped 4/3/23 1438)        Assessments:      Independent Interpretation (X-rays, CTs, rhythm strip):  Chest x-ray without acute disease on my read.    Consultations/Discussion of Management or Tests:  Hospitalist service       Social Determinants of Health affecting care:   None    Disposition:  The patient was admitted to the hospital under the care of Dr. Siu.     Impression & Plan    CMedical Decision Making:  Ron Gilmore is an 82-year-old man who is afebrile but hypoxic.  He is about 90 to 91% on 15 L nonrebreather.  He tells me he is DNR/DNI and does have COPD so I do not keep him on 15 L nonrebreather for prolonged period so we did do BiPAP with good improvement of his oxygen saturation and breathing.  Chest x-ray is unremarkable for acute disease.  Lab work-up as noted as above.  CT scan of the chest with PE protocol revealed potential bilateral infiltrates consistent with potential pneumonia and concern for potential aspiration given his vomiting earlier.  I will cover empirically with antibiotics.  I discussed admission and he is in agreement.  Spoke with hospital service who accepts him to the intermediate floor and is in stable condition awaiting transport to the floor.    Diagnosis:    ICD-10-CM    1. Acute respiratory failure with hypoxia (H)  J96.01       2. Pneumonia due to infectious organism, unspecified  laterality, unspecified part of lung  J18.9          4/3/2023   MD Jaz Sheriff Nicholas J, MD  04/03/23 1612

## 2023-04-03 NOTE — PHARMACY-ADMISSION MEDICATION HISTORY
Pharmacist Admission Medication History    Admission medication history is complete. The information provided in this note is only as accurate as the sources available at the time of the update.    Medication reconciliation/reorder completed by provider prior to medication history? No    Information Source(s): Facility ([facility]/NH/) medication list/MAR - [facility] - via N/A    Pertinent Information: Verified with RN at facility, pt uses both scheduled Duoneb (TID) and Combivent (QID).    Changes made to PTA medication list:    Added: Imodium, senna-S    Deleted: azithromycin, ferrous sulfate    Changed:   o Scheduled Duoneb from BID -> TID  o Furosemide from 40 mg -> 30 mg    Medication Affordability:  Not including over the counter (OTC) medications, was there a time in the past 12 months when you did not take your medications as prescribed because of cost?: Unable to Assess    Allergies reviewed with patient and updates made in EHR: unable to assess    Medication History Completed By: Amie Ocampo [facility] 4/3/2023 1:15 PM    Prior to Admission medications    Medication Sig Last Dose Taking? Auth Provider Long Term End Date   acetaminophen (TYLENOL) 325 MG tablet Take 650 mg by mouth every 4 hours as needed for mild pain as needed for pain/fever Max dose 3000mg/24hr prn Yes Reported, Patient     allopurinol (ZYLOPRIM) 300 MG tablet Take 300 mg by mouth every morning 4/3/2023 at 0900 Yes Unknown, Entered By History     ammonium lactate (LAC-HYDRIN) 12 % external lotion Apply topically daily Apply thin film to bilateral feet and legs 4/3/2023 at 0900 Yes Unknown, Entered By History     aspirin (ASA) 81 MG EC tablet Take 1 tablet (81 mg) by mouth daily 4/3/2023 at 0900 Yes Rg Tobin,      atorvastatin (LIPITOR) 10 MG tablet Take 10 mg by mouth every morning 4/3/2023 at 0900 Yes Unknown, Entered By History No    cyanocobalamin (VITAMIN B-12) 500 MCG tablet Take 500 mcg by mouth every morning  4/3/2023 at 0900 Yes Unknown, Entered By History     Emollient (AMLACTIN ULTRA EX) Apply topically as needed TO FEET prn Yes Reported, Patient     furosemide (LASIX) 40 MG tablet Take 1 tablet (40 mg) by mouth daily  Patient taking differently: Take 30 mg by mouth daily 4/3/2023 at 0900 Yes Barron Duque MD Yes    hypromellose (ARTIFICIAL TEARS) 0.5 % SOLN ophthalmic solution 1 drop 2 times daily as needed (eye irritation) Into affected eye prn Yes Unknown, Entered By History     hypromellose (ARTIFICIAL TEARS) 0.5 % SOLN ophthalmic solution 2 drops 2 times daily Into the affected eye (eye irritation)  0900, 2000 4/3/2023 at 0900 Yes Unknown, Entered By History     ipratropium - albuterol 0.5 mg/2.5 mg/3 mL (DUONEB) 0.5-2.5 (3) MG/3ML neb solution Take 1 vial by nebulization daily as needed for shortness of breath, wheezing or cough prn Yes Unknown, Entered By History No    ipratropium - albuterol 0.5 mg/2.5 mg/3 mL (DUONEB) 0.5-2.5 (3) MG/3ML neb solution Take 1 vial by nebulization 3 times daily 0900, 1400, 2100 4/3/2023 at 0900 Yes Unknown, Entered By History No    ipratropium-albuterol (COMBIVENT RESPIMAT)  MCG/ACT inhaler Inhale 1 puff into the lungs 4 times daily 0900, 1200 ,1600 ,2000 4/3/2023 at 0900 Yes Reported, Patient No    loperamide (IMODIUM) 2 MG capsule Take 2 mg by mouth every 6 hours as needed for diarrhea prn Yes Unknown, Entered By History     methenamine hippurate (HIPREX) 1 g tablet Take 1 g by mouth 2 times daily 4/3/2023 at 0900 Yes Unknown, Entered By History     metoprolol tartrate (LOPRESSOR) 25 MG tablet Take 0.5 tablets (12.5 mg) by mouth 2 times daily 4/3/2023 at 0900 Yes Romulo Cavanaugh MD Yes    pantoprazole (PROTONIX) 40 MG EC tablet Take 40 mg by mouth 2 times daily 4/3/2023 at 0900 Yes Unknown, Entered By History     PARoxetine (PAXIL) 10 MG tablet Take 10 mg by mouth every morning Take with 40mg tablet to equal 50mg total 4/3/2023 at 0900 Yes Unknown, Entered By  History No    PARoxetine (PAXIL) 40 MG tablet Take 40 mg by mouth every morning Take with 10mg tablet to equal 50mg total 4/3/2023 at 0900 Yes Unknown, Entered By History No    polyethylene glycol (MIRALAX) 17 GM/Dose powder Take 17 g by mouth daily 4/3/2023 at 0900 Yes Romulo Cavanaugh MD     senna-docusate (SENOKOT-S/PERICOLACE) 8.6-50 MG tablet Take 1 tablet by mouth daily 4/3/2023 at 0900 Yes Unknown, Entered By History     senna-docusate (SENOKOT-S/PERICOLACE) 8.6-50 MG tablet Take 1 tablet by mouth daily as needed for constipation prn Yes Unknown, Entered By History     simethicone (MYLICON) 125 MG chewable tablet Take 125 mg by mouth 3 times daily as needed for intestinal gas prn Yes Unknown, Entered By History     Skin Protectants, Misc. (LANTISEPTIC SKIN PROTECTANT EX) Externally apply topically 2 times daily as needed Apply a thin film to vincent area/buttocks prn Yes Unknown, Entered By History     Skin Protectants, Misc. (LANTISEPTIC SKIN PROTECTANT EX) Externally apply topically 2 times daily Apply a thin film to vincent area/buttocks 4/3/2023 at 0800 Yes Unknown, Entered By History

## 2023-04-03 NOTE — PLAN OF CARE
Goal Outcome Evaluation:  Pt arrived to the unit on BiPAP 90% 12/6. 98%. Obtained VBG. During mouth cares pt was able to tolerate O2. Currently on 3L NC. Pulsate mattress ordered. Decubitus ulcer in place from care home. Pt states improving. Turn Q 2. A/O x 4. Lift pt. Lungs crackles throughout. Remains NPO @ this time.

## 2023-04-03 NOTE — H&P
[facility]    History and Physical  Hospitalist       Date of Admission:  4/3/2023    Assessment & Plan   Ron Gilmore is a 82 year old male with PMH of COPD on chronic oxygen, hx CVA, chronic indwelling chirinos catheter, DM2, HTN, HLD, BPH, depression, neuropathy, spinal stenosis, hx GIB, wheechair bound, who is being admitted for acute hypoxic respiratory failure.    Probable health care acquired pneumonia  Acute hypoxic respiratory failure  Sepsis  COPD on chronic oxygen 1.5L  Patient presents from long term care facility with hypoxia, he reports two days of malaise and sore throat. Reports he is chronically on 1.5L. Noted sating 90% on 15L in the ED and switched over to BiPap. He looks comfortable. CXR unremarkable. CT PE negative for PE but does show chronic COPD changes and some L > R basilar infiltrates concerning for pneumonia. Noted leukocytosis 15k. NT pro BNP is not high, he does not have a cardiac history, most likely he is somewhat volume down, given 1L IVF in the ED. Overall clinical appearance appears most consistent with pneumonia, possibly aspiration, treat and monitor for improvement.  - Continue BiPap, wean off as able  - Zosyn and azithromycin started in the ED, continue  - Hold further IVF for now, also hold PTA lasix  - IMC status due to Bipap need  - Check VBG in AM  - Continue PTA Duonebs TID  - PT/OT  - Given methylpred 125mg IV x1 in the ED, will hold further doses no acute wheezing noted    Type 2 MI due to above  Initial troponin 26. EKG NSR.  - Trend troponin x1    DM2  Neuropathy   on admission. A1c 6.1% in 5/2022  - Sliding scale insulin  - Check A1c    Hx CVA  Dysphagia  Reportedly has some chronic LUE weakness. He notes he is on a thickened liquid diet at his facility.  - Speech consult    Chronic indwelling chirinos catheter  BPH  Has history of UTIs. UA appears dirty here not unexpected. Already covered by antibiotics above  - Follow urine culture  -  Continue chirinos catheter here    Decubitus ulcers: unclear stage, reported per daughter  - Please have nurse to evaluate on admission    HTN, HLD: Continue PTA statin and aspirin and metoprolol  Hx Gout: Continue PTA allopurinol  Depression: Continue PTA paxil    DVT Prophylaxis: Enoxaparin (Lovenox) SQ  Code Status: DNR.   - Patient indicated he was DNI to ED provider, then to me that he was okay to intubate. Possibly some confusion here, consider readdressing code status this admission.    Disposition: Expected discharge 3-4 days pending improvement in respiratory failure    Agustin Siu MD, MD    Primary Care Physician   Kalen Metcalf    Chief Complaint   Acute hypoxic respiratory failure    History is obtained from the patient  Case discussed with ED provider  Called and updated López (daughter)    History of Present Illness   Ron Gilmore is a 82 year old male who presents with acute hypoxic respiratory failure. He was brought in from his care facility for hypoxia. He reports that he has been feeling under the weather and has had a sore throat for 2 days. He eats a regular diet with thickened liquids at this facility. Denies any chocking or vomiting episodes recently. He otherwise denies any other complaints such as abdominal pain, diarrhea, constipation. He denies fevers, chest pain, and also denies any shortness of breath. He is currently on BiPap in the ED however he notes that his breathing felt fine before being put on the Bipap.    Past Medical History    I have reviewed this patient's medical history and updated it with pertinent information if needed.   Past Medical History:   Diagnosis Date     BPH (benign prostatic hyperplasia)      Cataract      Cholelithiasis      COPD (chronic obstructive pulmonary disease) (H)      Depression      Diabetes mellitus     Type 2     Dyshidrotic foot dermatitis      Edema      Gout      Hyperlipidemia      Hypertension      Infection due to 2019 novel  coronavirus 5/1/2021     Kidney stones     Bladder Stones     Lumbago      Lumbar disc displacement without myelopathy      Muscle weakness      Neuropathy, diabetic (H)      Obesity      Spinal stenosis      Stroke (H)     with residual effects- weakness LUE> LLE     Unsteady gait      Urinary retention with incomplete bladder emptying      UTI (urinary tract infection)      Vasovagal episode        Past Surgical History   I have reviewed this patient's surgical history and updated it with pertinent information if needed.  Past Surgical History:   Procedure Laterality Date     APPENDECTOMY OPEN       ARTHROSCOPY SHOULDER ROTATOR CUFF REPAIR       cataracts Bilateral      CHOLECYSTECTOMY       COLONOSCOPY  1986     COLONOSCOPY N/A 5/29/2021    Procedure: COLONOSCOPY;  Surgeon: Kofi Davis MD;  Location:  GI     CYSTOSCOPY  10/19/2011    Procedure:CYSTOSCOPY; CYSTOSCOPY, BLADDER STONE REMOVAL; Surgeon:ROB SAWYER; Location: OR     CYSTOSCOPY, TRANSURETHRAL RESECTION (TUR) PROSTATE, COMBINED N/A 2/21/2018    Procedure: COMBINED CYSTOSCOPY, TRANSURETHRAL RESECTION (TUR) PROSTATE;  COMBINED CYSTOSCOPY, TRANSURETHRAL RESECTION (TUR) PROSTATE ;  Surgeon: Rob Sawyer MD;  Location:  OR     EP LOOP RECORDER IMPLANT N/A 1/20/2020    Procedure: EP Loop Recorder Implant;  Surgeon: Evgeny Parisi MD;  Location:  HE[facility]ARD[facility] CATH LAB     ESOPHAGOSCOPY, GASTROSCOPY, DUODENOSCOPY (EGD), COMBINED N/A 5/28/2021    Procedure: ESOPHAGOGASTRODUODENOSCOPY (EGD);  Surgeon: Aurora Waterman MD;  Location:  GI     ESOPHAGOSCOPY, GASTROSCOPY, DUODENOSCOPY (EGD), DILATATION, COMBINED N/A 9/24/2022    Procedure: ESOPHAGOGASTRODUODENOSCOPY, WITH DILATION;  Surgeon: Kofi Davis MD;  Location:  GI     EYE SURGERY      right lid surgery      IR IVC FILTER PLACEMENT  5/24/2021     IR NEPHROSTOMY TUBE PLACEMENT RIGHT  3/9/2021     IR URETERAL STENT PLACEMENT RIGHT  3/16/2021     JOINT REPLACEMENT  Right     HIP     KNEE SURGERY Bilateral      LAMINECTOMY LUMBAR ONE LEVEL       LASER HOLMIUM LITHOTRIPSY URETER(S), INSERT STENT, COMBINED Right 4/14/2021    Procedure: CYSTOSCOPY, BLADDER STONE REMOVAL, RIGHT URETEROSCOPY, HOLMIUM LASER LITHOTRIPSY, AND RIGHT STENT REMOVAL, RIGHT RETROGRADE;  Surgeon: Rob Sullivan MD;  Location: [facility] OR     TONSILLECTOMY         Prior to Admission Medications   Prior to Admission Medications   Prescriptions Last Dose Informant Patient Reported? Taking?   Emollient (AMLACTIN ULTRA EX) prn Nursing Home Yes Yes   Sig: Apply topically as needed TO FEET   PARoxetine (PAXIL) 10 MG tablet 4/3/2023 at 0900 [facility] Yes Yes   Sig: Take 10 mg by mouth every morning Take with 40mg tablet to equal 50mg total   PARoxetine (PAXIL) 40 MG tablet 4/3/2023 at 0900 [facility] Yes Yes   Sig: Take 40 mg by mouth every morning Take with 10mg tablet to equal 50mg total   Skin Protectants, Misc. (LANTISEPTIC SKIN PROTECTANT EX) prn Nursing Home Yes Yes   Sig: Externally apply topically 2 times daily as needed Apply a thin film to vincent area/buttocks   Skin Protectants, Misc. (LANTISEPTIC SKIN PROTECTANT EX) 4/3/2023 at 0800 Nursing Home Yes Yes   Sig: Externally apply topically 2 times daily Apply a thin film to vincent area/buttocks   acetaminophen (TYLENOL) 325 MG tablet prn Nursing Home Yes Yes   Sig: Take 650 mg by mouth every 4 hours as needed for mild pain as needed for pain/fever Max dose 3000mg/24hr   allopurinol (ZYLOPRIM) 300 MG tablet 4/3/2023 at 0900 [facility] Yes Yes   Sig: Take 300 mg by mouth every morning   ammonium lactate (LAC-HYDRIN) 12 % external lotion 4/3/2023 at 0900 [facility] Yes Yes   Sig: Apply topically daily Apply thin film to bilateral feet and legs   aspirin (ASA) 81 MG EC tablet 4/3/2023 at 0900 [facility] No Yes   Sig: Take 1 tablet (81 mg) by mouth daily   atorvastatin (LIPITOR) 10 MG tablet 4/3/2023 at 0900 [facility] Yes Yes   Sig: Take 10 mg by  mouth every morning   cyanocobalamin (VITAMIN B-12) 500 MCG tablet 4/3/2023 at 0900 [facility] Yes Yes   Sig: Take 500 mcg by mouth every morning   furosemide (LASIX) 40 MG tablet 4/3/2023 at 0900 [facility] No Yes   Sig: Take 1 tablet (40 mg) by mouth daily   Patient taking differently: Take 30 mg by mouth daily   hypromellose (ARTIFICIAL TEARS) 0.5 % SOLN ophthalmic solution 4/3/2023 at 0900 [facility] Yes Yes   Si drops 2 times daily Into the affected eye (eye irritation)  900,    hypromellose (ARTIFICIAL TEARS) 0.5 % SOLN ophthalmic solution prn [facility] Yes Yes   Si drop 2 times daily as needed (eye irritation) Into affected eye   ipratropium - albuterol 0.5 mg/2.5 mg/3 mL (DUONEB) 0.5-2.5 (3) MG/3ML neb solution prn [facility] Yes Yes   Sig: Take 1 vial by nebulization daily as needed for shortness of breath, wheezing or cough   ipratropium - albuterol 0.5 mg/2.5 mg/3 mL (DUONEB) 0.5-2.5 (3) MG/3ML neb solution 4/3/2023 at 0900 [facility] Yes Yes   Sig: Take 1 vial by nebulization 3 times daily 0900, 1400, 2100   ipratropium-albuterol (COMBIVENT RESPIMAT)  MCG/ACT inhaler 4/3/2023 at [address] [place] [address] Yes Yes   Sig: Inhale 1 puff into the lungs 4 times daily 0900, 1200 ,1600 ,   loperamide (IMODIUM) 2 MG capsule prn [facility] Yes Yes   Sig: Take 2 mg by mouth every 6 hours as needed for diarrhea   methenamine hippurate (HIPREX) 1 g tablet 4/3/2023 at 0900 [facility] Yes Yes   Sig: Take 1 g by mouth 2 times daily   metoprolol tartrate (LOPRESSOR) 25 MG tablet 4/3/2023 at 0900 [facility] No Yes   Sig: Take 0.5 tablets (12.5 mg) by mouth 2 times daily   pantoprazole (PROTONIX) 40 MG EC tablet 4/3/2023 at [address] [place] [address] Yes Yes   Sig: Take 40 mg by mouth 2 times daily   polyethylene glycol (MIRALAX) 17 GM/Dose powder 4/3/2023 at 0900 [facility] No Yes   Sig: Take 17 g by mouth daily   senna-docusate (SENOKOT-S/PERICOLACE) 8.6-50 MG tablet 4/3/2023 at 0900 Nursing  Home Yes Yes   Sig: Take 1 tablet by mouth daily   senna-docusate (SENOKOT-S/PERICOLACE) 8.6-50 MG tablet prn Nursing Home Yes Yes   Sig: Take 1 tablet by mouth daily as needed for constipation   simethicone (MYLICON) 125 MG chewable tablet prn Nursing Home Yes Yes   Sig: Take 125 mg by mouth 3 times daily as needed for intestinal gas      Facility-Administered Medications: None     Allergies   No Known Allergies    Social History   I have reviewed this patient's social history and updated it with pertinent information if needed. Ron Gilmore  reports that he has quit smoking. He has never used smokeless tobacco. He reports that he does not drink alcohol and does not use drugs.    Family History   I have reviewed this patient's family history and updated it with pertinent information if needed.   Family History   Problem Relation Age of Onset     Prostate Cancer Father        Review of Systems   The 10 point Review of Systems is negative other than noted in the HPI or here.    Physical Exam   Temp: 98.4  F (36.9  C) Temp src: Temporal BP: 94/77 Pulse: 87   Resp: 16 SpO2: 98 % O2 Device: BiPAP/CPAP    Vital Signs with Ranges  Temp:  [98.4  F (36.9  C)] 98.4  F (36.9  C)  Pulse:  [87-93] 87  Resp:  [15-22] 16  BP: ()/(72-89) 94/77  FiO2 (%):  [80 %] 80 %  SpO2:  [92 %-98 %] 98 %  0 lbs 0 oz    Constitutional: Obese in NAD, chronically ill appearing  Eyes: PERRL, nonicteric, normal ocular movements  HEENT: Normocephalic, atraumatic, bipap mask on  Respiratory: Ronchorous, no clear crackles  Cardiovascular: RRR, normal S1/2, no m/r/g  GI: Nontender, nondistended  Vascular: Trace lower extremity pitting edema  Musculoskeletal: Moves all extremities  Neurologic: A&Ox3  Psychiatric: Appropriate affect and mood    Data   Data reviewed today:  I personally reviewed CXR, CT, .  Recent Labs   Lab 04/03/23  1120 04/03/23  1058   WBC  --  15.2*   HGB  --  13.7   MCV  --  99   PLT  --  175   NA  --  136   POTASSIUM  --   4.5   CHLORIDE  --  96*   CO2  --  29   BUN  --  18.5   CR 1.0 0.99   ANIONGAP  --  11   RAJ  --  9.2   GLC  --  186*   ALBUMIN  --  3.2*   PROTTOTAL  --  7.3   BILITOTAL  --  0.8   ALKPHOS  --  76   ALT  --  17   AST  --  21       Imaging:  Recent Results (from the past 24 hour(s))   XR Chest Port 1 View    Narrative    CHEST ONE VIEW  4/3/2023 11:21 AM     HISTORY: Respiratory distress    COMPARISON: None.      Impression    IMPRESSION: No acute disease.    BRISSA MENESES MD         SYSTEM ID:  [id number]   CT Chest Pulmonary Embolism w Contrast    Narrative    CT CHEST PULMONARY EMBOLISM WITH CONTRAST April 3, 2023 12:04 PM    CLINICAL HISTORY: Chest Pain. Hypoxia, bed bound.    TECHNIQUE: CT angiogram chest during arterial phase injection IV  contrast. 2D and 3D MIP reconstructions were performed by the CT  technologist. Dose reduction techniques were used.   CONTRAST: 79mL [id number].    COMPARISON: January 30, 2023.    FINDINGS:  ANGIOGRAM CHEST: Pulmonary arteries are normal caliber and negative  for pulmonary emboli. Thoracic aorta is negative for dissection. No CT  evidence of right heart strain.    LUNGS AND PLEURA: Mild dependent atelectasis and/or infiltrate.  Elevated left hemidiaphragm. No effusions. Benign granulomatous  change.    MEDIASTINUM/AXILLAE: Mildly enlarged paratracheal node measuring 1.6 x  1.1 cm, this is increased in size from previous. Mildly prominent  right hilar node measuring 2.5 x 2.0 cm. No aneurysm.    CORONARY ARTERY CALCIFICATIONS: Severe and/or stents.    UPPER ABDOMEN: No acute findings.    MUSCULOSKELETAL: No frankly destructive bony lesions.      Impression    IMPRESSION:  1.  No pulmonary embolism demonstrated.  2.  Nonspecific mild adenopathy.  3.  Bibasilar atelectasis and/or infiltrate left worse than right.

## 2023-04-03 NOTE — ED NOTES
Pt arrived from Care facility ([facility])  sp cva , wheelchair bound.   Left UE weakness following stroke.   Chronic indwelling cath-   dk urine output.     Staff at facility states low sats (82%)   medic witnessed lowest 86% on 2L.   4 L per medics 89%.   Vomit this am.  Pt without complaints.       LS some crackles upper lobes per medics.

## 2023-04-03 NOTE — ED NOTES
[facility]  ED Nurse Handoff Report    ED Chief complaint: Shortness of Breath      ED Diagnosis:   Final diagnoses:   None       Code Status: not addressed at this time    Allergies: No Known Allergies    Patient Story: Pt arrived from Care facility [facility]  sp cva , wheelchair bound.   Left UE weakness following stroke.   Chronic indwelling cath-   dk urine output.     Staff at facility states low sats (82%)   medic witnessed lowest 86% on 2L.   4 L per medics 89%.   Vomit this am.  Pt without complaints.   Focused Assessment: On arrival pt was on 15L oxymask at 90%. Placed on BIPAP 80% 12/5. A&Ox4, denies pain, denies SOB. Gen weakness noted.   Labs Ordered and Resulted from Time of ED Arrival to Time of ED Departure   COMPREHENSIVE METABOLIC PANEL - Abnormal       Result Value    Sodium 136      Potassium 4.5      Chloride 96 (*)     Carbon Dioxide (CO2) 29      Anion Gap 11      Urea Nitrogen 18.5      Creatinine 0.99      Calcium 9.2      Glucose 186 (*)     Alkaline Phosphatase 76      AST 21      ALT 17      Protein Total 7.3      Albumin 3.2 (*)     Bilirubin Total 0.8      GFR Estimate 77     TROPONIN T, HIGH SENSITIVITY - Abnormal    Troponin T, High Sensitivity 27 (*)    ROUTINE UA WITH MICROSCOPIC REFLEX TO CULTURE - Abnormal    Color Urine Orange (*)     Appearance Urine Cloudy (*)     Glucose Urine Negative      Bilirubin Urine Negative      Ketones Urine Negative      Specific Gravity Urine 1.027      Blood Urine Small (*)     pH Urine 6.5      Protein Albumin Urine 100 (*)     Urobilinogen Urine Normal      Nitrite Urine Negative      Leukocyte Esterase Urine Large (*)     Bacteria Urine Few (*)     WBC Clumps Urine Present (*)     Mucus Urine Present (*)     RBC Urine 52 (*)     WBC Urine >182 (*)     Hyaline Casts Urine 13 (*)    CBC WITH PLATELETS AND DIFFERENTIAL - Abnormal    WBC Count 15.2 (*)     RBC Count 4.34 (*)     Hemoglobin 13.7      Hematocrit 42.8      MCV 99       MCH 31.6      MCHC 32.0      RDW 15.9 (*)     Platelet Count 175      % Neutrophils 83      % Lymphocytes 6      % Monocytes 11      % Eosinophils 0      % Basophils 0      % Immature Granulocytes 0      NRBCs per 100 WBC 0      Absolute Neutrophils 12.5 (*)     Absolute Lymphocytes 1.0      Absolute Monocytes 1.6 (*)     Absolute Eosinophils 0.0      Absolute Basophils 0.0      Absolute Immature Granulocytes 0.1      Absolute NRBCs 0.0     ISTAT GASES LACTATE VENOUS POCT - Abnormal    Lactic Acid POCT 1.5      Bicarbonate Venous POCT 34 (*)     O2 Sat, Venous POCT 89 (*)     pCO2V Venous POCT 57 (*)     pH Venous POCT 7.39      pO2 Venous POCT 59 (*)    NT PROBNP INPATIENT - Normal    N terminal Pro BNP Inpatient 903     INFLUENZA A/B, RSV, & SARS-COV2 PCR - Normal    Influenza A PCR Negative      Influenza B PCR Negative      RSV PCR Negative      SARS CoV2 PCR Negative     ISTAT CREATININE POCT - Normal    Creatinine POCT 1.0      GFR, ESTIMATED POCT >60     BLOOD GAS ARTERIAL WITH OXYHEMOGLOBIN   ISTAT CREATININE POCT   BLOOD CULTURE   BLOOD CULTURE   URINE CULTURE     CT Chest Pulmonary Embolism w Contrast   Preliminary Result   IMPRESSION:   1.  No pulmonary embolism demonstrated.   2.  Nonspecific mild adenopathy.   3.  Bibasilar atelectasis and/or infiltrate left worse than right.      XR Chest Port 1 View   Preliminary Result   IMPRESSION: No acute disease.          Treatments and/or interventions provided: see MAR  Patient's response to treatments and/or interventions: continuing to assess    To be done/followed up on inpatient unit:  cont with admitting MD orders    Does this patient have any cognitive concerns?: Forgetful    Activity level - Baseline/Home:  Stand with assist x2  Activity Level - Current:   Total Care    Patient's Preferred language: English   Needed?: No    Isolation: None  Infection: Not Applicable  Patient tested for COVID 19 prior to admission: YES  Bariatric?: Yes    Vital  Signs:   Vitals:    04/03/23 1133 04/03/23 1200 04/03/23 1215 04/03/23 1230   BP:  132/72  94/77   Pulse:   87 87   Resp: 22  15 16   Temp: 98.4  F (36.9  C)      TempSrc: Temporal      SpO2:   98% 98%       Cardiac Rhythm:     Was the PSS-3 completed:   Yes  What interventions are required if any?               Family Comments: none at bedside      For the majority of the shift this patient's behavior was Green.   Behavioral interventions performed were none .    ED NURSE PHONE NUMBER: [phone]

## 2023-04-04 ENCOUNTER — APPOINTMENT (OUTPATIENT)
Dept: GENERAL RADIOLOGY | Facility: CLINIC | Age: 83
DRG: 871 | End: 2023-04-04
Payer: MEDICARE

## 2023-04-04 ENCOUNTER — APPOINTMENT (OUTPATIENT)
Dept: SPEECH THERAPY | Facility: CLINIC | Age: 83
DRG: 871 | End: 2023-04-04
Attending: INTERNAL MEDICINE
Payer: MEDICARE

## 2023-04-04 LAB
ANION GAP SERPL CALCULATED.3IONS-SCNC: 11 MMOL/L (ref 7–15)
BASE EXCESS BLDV CALC-SCNC: 7.2 MMOL/L (ref -7.7–1.9)
BUN SERPL-MCNC: 23 MG/DL (ref 8–23)
CALCIUM SERPL-MCNC: 9.7 MG/DL (ref 8.8–10.2)
CHLORIDE SERPL-SCNC: 101 MMOL/L (ref 98–107)
CREAT SERPL-MCNC: 0.95 MG/DL (ref 0.67–1.17)
DEPRECATED HCO3 PLAS-SCNC: 29 MMOL/L (ref 22–29)
ERYTHROCYTE [DISTWIDTH] IN BLOOD BY AUTOMATED COUNT: 15.9 % (ref 10–15)
ERYTHROCYTE [DISTWIDTH] IN BLOOD BY AUTOMATED COUNT: 16.2 % (ref 10–15)
GFR SERPL CREATININE-BSD FRML MDRD: 81 ML/MIN/1.73M2
GLUCOSE BLDC GLUCOMTR-MCNC: 141 MG/DL (ref 70–99)
GLUCOSE BLDC GLUCOMTR-MCNC: 141 MG/DL (ref 70–99)
GLUCOSE BLDC GLUCOMTR-MCNC: 143 MG/DL (ref 70–99)
GLUCOSE BLDC GLUCOMTR-MCNC: 164 MG/DL (ref 70–99)
GLUCOSE SERPL-MCNC: 158 MG/DL (ref 70–99)
HCO3 BLDV-SCNC: 34 MMOL/L (ref 21–28)
HCT VFR BLD AUTO: 40.3 % (ref 40–53)
HCT VFR BLD AUTO: 40.6 % (ref 40–53)
HGB BLD-MCNC: 12.5 G/DL (ref 13.3–17.7)
HGB BLD-MCNC: 12.9 G/DL (ref 13.3–17.7)
LACTATE SERPL-SCNC: 2.2 MMOL/L (ref 0.7–2)
LACTATE SERPL-SCNC: 3 MMOL/L (ref 0.7–2)
LACTATE SERPL-SCNC: 3.3 MMOL/L (ref 0.7–2)
MCH RBC QN AUTO: 31.3 PG (ref 26.5–33)
MCH RBC QN AUTO: 31.5 PG (ref 26.5–33)
MCHC RBC AUTO-ENTMCNC: 31 G/DL (ref 31.5–36.5)
MCHC RBC AUTO-ENTMCNC: 31.8 G/DL (ref 31.5–36.5)
MCV RBC AUTO: 101 FL (ref 78–100)
MCV RBC AUTO: 99 FL (ref 78–100)
O2/TOTAL GAS SETTING VFR VENT: 2 %
PCO2 BLDV: 52 MM HG (ref 40–50)
PH BLDV: 7.42 [PH] (ref 7.32–7.43)
PLATELET # BLD AUTO: 165 10E3/UL (ref 150–450)
PLATELET # BLD AUTO: 183 10E3/UL (ref 150–450)
PO2 BLDV: 51 MM HG (ref 25–47)
POTASSIUM SERPL-SCNC: 4.2 MMOL/L (ref 3.4–5.3)
RBC # BLD AUTO: 4 10E6/UL (ref 4.4–5.9)
RBC # BLD AUTO: 4.09 10E6/UL (ref 4.4–5.9)
SODIUM SERPL-SCNC: 141 MMOL/L (ref 136–145)
WBC # BLD AUTO: 16.6 10E3/UL (ref 4–11)
WBC # BLD AUTO: 16.8 10E3/UL (ref 4–11)

## 2023-04-04 PROCEDURE — 94640 AIRWAY INHALATION TREATMENT: CPT

## 2023-04-04 PROCEDURE — G0463 HOSPITAL OUTPT CLINIC VISIT: HCPCS

## 2023-04-04 PROCEDURE — 120N000013 HC R&B IMCU

## 2023-04-04 PROCEDURE — 250N000011 HC RX IP 250 OP 636: Performed by: INTERNAL MEDICINE

## 2023-04-04 PROCEDURE — 92610 EVALUATE SWALLOWING FUNCTION: CPT | Mod: GN

## 2023-04-04 PROCEDURE — 85027 COMPLETE CBC AUTOMATED: CPT

## 2023-04-04 PROCEDURE — 250N000009 HC RX 250: Performed by: INTERNAL MEDICINE

## 2023-04-04 PROCEDURE — 82803 BLOOD GASES ANY COMBINATION: CPT | Performed by: INTERNAL MEDICINE

## 2023-04-04 PROCEDURE — 258N000003 HC RX IP 258 OP 636

## 2023-04-04 PROCEDURE — 94640 AIRWAY INHALATION TREATMENT: CPT | Mod: 76

## 2023-04-04 PROCEDURE — 99232 SBSQ HOSP IP/OBS MODERATE 35: CPT | Performed by: HOSPITALIST

## 2023-04-04 PROCEDURE — 80048 BASIC METABOLIC PNL TOTAL CA: CPT | Performed by: INTERNAL MEDICINE

## 2023-04-04 PROCEDURE — 71045 X-RAY EXAM CHEST 1 VIEW: CPT

## 2023-04-04 PROCEDURE — 999N000147 HC STATISTIC PT IP EVAL DEFER: Performed by: PHYSICAL THERAPIST

## 2023-04-04 PROCEDURE — 250N000012 HC RX MED GY IP 250 OP 636 PS 637: Performed by: INTERNAL MEDICINE

## 2023-04-04 PROCEDURE — 36415 COLL VENOUS BLD VENIPUNCTURE: CPT | Performed by: HOSPITALIST

## 2023-04-04 PROCEDURE — 999N000157 HC STATISTIC RCP TIME EA 10 MIN

## 2023-04-04 PROCEDURE — 258N000003 HC RX IP 258 OP 636: Performed by: HOSPITALIST

## 2023-04-04 PROCEDURE — 82805 BLOOD GASES W/O2 SATURATION: CPT | Performed by: HOSPITALIST

## 2023-04-04 PROCEDURE — 250N000013 HC RX MED GY IP 250 OP 250 PS 637: Performed by: INTERNAL MEDICINE

## 2023-04-04 PROCEDURE — 93005 ELECTROCARDIOGRAM TRACING: CPT

## 2023-04-04 PROCEDURE — 83605 ASSAY OF LACTIC ACID: CPT | Performed by: HOSPITALIST

## 2023-04-04 PROCEDURE — 36415 COLL VENOUS BLD VENIPUNCTURE: CPT

## 2023-04-04 PROCEDURE — 99291 CRITICAL CARE FIRST HOUR: CPT

## 2023-04-04 PROCEDURE — 93010 ELECTROCARDIOGRAM REPORT: CPT | Performed by: INTERNAL MEDICINE

## 2023-04-04 PROCEDURE — 85027 COMPLETE CBC AUTOMATED: CPT | Performed by: INTERNAL MEDICINE

## 2023-04-04 PROCEDURE — 36415 COLL VENOUS BLD VENIPUNCTURE: CPT | Performed by: INTERNAL MEDICINE

## 2023-04-04 RX ORDER — CARBOXYMETHYLCELLULOSE SODIUM 5 MG/ML
1 SOLUTION/ DROPS OPHTHALMIC
Status: DISCONTINUED | OUTPATIENT
Start: 2023-04-04 | End: 2023-04-09 | Stop reason: HOSPADM

## 2023-04-04 RX ADMIN — IPRATROPIUM BROMIDE 0.5 MG: 0.5 SOLUTION RESPIRATORY (INHALATION) at 07:30

## 2023-04-04 RX ADMIN — ACETAMINOPHEN 650 MG: 325 TABLET, FILM COATED ORAL at 01:44

## 2023-04-04 RX ADMIN — LEVALBUTEROL HYDROCHLORIDE 1.25 MG: 1.25 SOLUTION RESPIRATORY (INHALATION) at 19:38

## 2023-04-04 RX ADMIN — ATORVASTATIN CALCIUM 10 MG: 10 TABLET, FILM COATED ORAL at 08:54

## 2023-04-04 RX ADMIN — INSULIN ASPART 1 UNITS: 100 INJECTION, SOLUTION INTRAVENOUS; SUBCUTANEOUS at 12:56

## 2023-04-04 RX ADMIN — PIPERACILLIN AND TAZOBACTAM 3.38 G: 3; .375 INJECTION, POWDER, FOR SOLUTION INTRAVENOUS at 23:30

## 2023-04-04 RX ADMIN — ALLOPURINOL 300 MG: 300 TABLET ORAL at 08:54

## 2023-04-04 RX ADMIN — PANTOPRAZOLE SODIUM 40 MG: 40 TABLET, DELAYED RELEASE ORAL at 21:28

## 2023-04-04 RX ADMIN — PAROXETINE HYDROCHLORIDE 50 MG: 10 TABLET, FILM COATED ORAL at 08:54

## 2023-04-04 RX ADMIN — LEVALBUTEROL HYDROCHLORIDE 1.25 MG: 1.25 SOLUTION RESPIRATORY (INHALATION) at 12:06

## 2023-04-04 RX ADMIN — PIPERACILLIN AND TAZOBACTAM 3.38 G: 3; .375 INJECTION, POWDER, FOR SOLUTION INTRAVENOUS at 05:56

## 2023-04-04 RX ADMIN — ACETAMINOPHEN 650 MG: 325 TABLET, FILM COATED ORAL at 22:08

## 2023-04-04 RX ADMIN — ASPIRIN 81 MG: 81 TABLET, COATED ORAL at 08:54

## 2023-04-04 RX ADMIN — PANTOPRAZOLE SODIUM 40 MG: 40 TABLET, DELAYED RELEASE ORAL at 08:55

## 2023-04-04 RX ADMIN — INSULIN ASPART 1 UNITS: 100 INJECTION, SOLUTION INTRAVENOUS; SUBCUTANEOUS at 16:45

## 2023-04-04 RX ADMIN — IPRATROPIUM BROMIDE 0.5 MG: 0.5 SOLUTION RESPIRATORY (INHALATION) at 12:06

## 2023-04-04 RX ADMIN — SODIUM CHLORIDE 500 ML: 9 INJECTION, SOLUTION INTRAVENOUS at 16:40

## 2023-04-04 RX ADMIN — PIPERACILLIN AND TAZOBACTAM 3.38 G: 3; .375 INJECTION, POWDER, FOR SOLUTION INTRAVENOUS at 00:33

## 2023-04-04 RX ADMIN — IPRATROPIUM BROMIDE 0.5 MG: 0.5 SOLUTION RESPIRATORY (INHALATION) at 19:38

## 2023-04-04 RX ADMIN — UMECLIDINIUM BROMIDE AND VILANTEROL TRIFENATATE 1 PUFF: 62.5; 25 POWDER RESPIRATORY (INHALATION) at 08:55

## 2023-04-04 RX ADMIN — LEVALBUTEROL HYDROCHLORIDE 1.25 MG: 1.25 SOLUTION RESPIRATORY (INHALATION) at 07:30

## 2023-04-04 RX ADMIN — ENOXAPARIN SODIUM 40 MG: 40 INJECTION SUBCUTANEOUS at 17:54

## 2023-04-04 RX ADMIN — METOPROLOL TARTRATE 12.5 MG: 25 TABLET, FILM COATED ORAL at 21:28

## 2023-04-04 RX ADMIN — PIPERACILLIN AND TAZOBACTAM 3.38 G: 3; .375 INJECTION, POWDER, FOR SOLUTION INTRAVENOUS at 12:55

## 2023-04-04 RX ADMIN — PIPERACILLIN AND TAZOBACTAM 3.38 G: 3; .375 INJECTION, POWDER, FOR SOLUTION INTRAVENOUS at 17:54

## 2023-04-04 RX ADMIN — METOPROLOL TARTRATE 12.5 MG: 25 TABLET, FILM COATED ORAL at 08:54

## 2023-04-04 RX ADMIN — Medication: at 08:55

## 2023-04-04 RX ADMIN — SODIUM CHLORIDE, POTASSIUM CHLORIDE, SODIUM LACTATE AND CALCIUM CHLORIDE 2000 ML: 600; 310; 30; 20 INJECTION, SOLUTION INTRAVENOUS at 19:36

## 2023-04-04 RX ADMIN — AZITHROMYCIN MONOHYDRATE 250 MG: 250 TABLET ORAL at 08:54

## 2023-04-04 ASSESSMENT — ACTIVITIES OF DAILY LIVING (ADL)
ADLS_ACUITY_SCORE: 39
ADLS_ACUITY_SCORE: 35
ADLS_ACUITY_SCORE: 39
ADLS_ACUITY_SCORE: 39
ADLS_ACUITY_SCORE: 35
ADLS_ACUITY_SCORE: 35
ADLS_ACUITY_SCORE: 39
ADLS_ACUITY_SCORE: 35
ADLS_ACUITY_SCORE: 35
ADLS_ACUITY_SCORE: 39
ADLS_ACUITY_SCORE: 39
ADLS_ACUITY_SCORE: 35

## 2023-04-04 NOTE — PLAN OF CARE
DATE & TIME: 0700-1930    Cognitive Concerns/ Orientation : A&O x4   BEHAVIOR & AGGRESSION TOOL COLOR: green   CIWA SCORE: na    ABNL VS/O2: vss, on 3 LPM NC, lungs fine crackles. Pt on 2 LPM NC at baseline per [facility] staff.   MOBILITY: lift at baseline, wheelchair bound. Turn Q 2, able to assist in turning to left side, will need Ax2 to turn to right side--left sided weakness due to prior stroke. Declined to get out of bed today-claiming he was too tired.   PAIN MANAGMENT: denied pain.   DIET: mod cho, mildly thick.   BOWEL/BLADDER: chronic chirinos, was exchanged  on 3/31/2023. Irrigated today per pt request. He is following irrigation twice per week at [facility]- order in for while in hospital.   ABNL LAB/BG: sepsis fired, lactic 3.0, bolus, will recheck at 1900. WBC 16.8. pt already on IV abx for PNA.   DRAIN/DEVICES: PIV, chirinos.   TELEMETRY RHYTHM: ST, discontinued now.   SKIN: pre-existing wound on buttock and left ear. Seen by wound care RN.   TESTS/PROCEDURES: na   D/C DATE: pending.     OTHER IMPORTANT INFO: had been living at [facility] for the last 15 months.

## 2023-04-04 NOTE — PROGRESS NOTES
Notified lactate came back at 3 as sepsis protocol triggered.  Will do 500 cc NS bolus.  Already on appropriate antibiotics.

## 2023-04-04 NOTE — PROVIDER NOTIFICATION
"MD Notification    Notified Person: MD    Notified Person Name: Ian Foy    Notification Date/Time: 4/3/23 2248    Notification Interaction: AMCOM     Purpose of Notification: \"3[address],Me pt has chronic chirinos catheter that was placed before admission. Do you mind placing an order for this? Thanks!\"    Orders Received: Continue chronic indwelling chirinos catheter    Comments:  "

## 2023-04-04 NOTE — PROGRESS NOTES
Occupational Therapy: Orders received. Chart reviewed and discussed with care team.? Occupational Therapy not indicated due to pt from LTC. Uses aleks and power WC at baseline, assist with ADL.? Defer discharge recommendations to care team.? Will complete orders.

## 2023-04-04 NOTE — PROVIDER NOTIFICATION
MD Notification    Notified Person: MD    Notified Person Name: Salih, Mohsin    Notification Date/Time: 1859 4/4/2023     Notification Interaction: amcom     Purpose of Notification: lactic 3.3 after bolus. Now needing 8 liters of O2 vs 3 liters earlier. Increased congestion and crackles noted    Orders Received: received call from MD, will need to activate RRT    Comments: pt also received thin liquid from dietary which he consumed about 1/4 cup for dinner.

## 2023-04-04 NOTE — PROVIDER NOTIFICATION
MD Notification    Notified Person: MD    Notified Person Name: Ovkellie     Notification Date/Time: 4/4/2023 1625    Notification Interaction: "[employer] messaging.     Purpose of Notification: lactic 3.0    Orders Received: 500 ml bolus.     Comments:

## 2023-04-04 NOTE — PLAN OF CARE
A&O x 4, pt forgetful at times. VSS on 3L via NC. Lung sounds: crackles in all lobes. Tele: SR w/ BBB. Assist x2 w/ lift, turn and repo q2h. NPO diet, except ice chips and meds, nectar thickened liquid w/ meds. PIV x1 CDI/SL. Decubitus ulcer acquired prior to admission, new mepi in place. Denies nausea/SOB, pt reports throat pain w/ swallowing, PRN tylenol provided. Adequate UOP via chirinos catheter. No BM during shift. Continue plan of care.

## 2023-04-04 NOTE — PROGRESS NOTES
Notified provider about indwelling chirinos catheter discussed removal or continued need.    Did provider choose to remove indwelling chirinos catheter? Yes    Provider's chirinos indication for keeping indwelling chirinos catheter: strict I&O    Is there an order for indwelling chirinos catheter? Yes    *If there is a plan to keep chirinos catheter in place at discharge daily notification with provider is not necessary, but please add a notation in the treatment team sticky note that the patient will be discharging with the catheter.

## 2023-04-04 NOTE — PLAN OF CARE
PT: Chart reviewed and orders received. Discussed with pt and RN, pt uses a power chair and Terrie lift for transfers at baseline. Will complete orders.

## 2023-04-04 NOTE — PROGRESS NOTES
04/04/23 1003   Appointment Info   Signing Clinician's Name / Credentials (SLP) An Rizzo MS CCC-SLP   General Information   Onset of Illness/Injury or Date of Surgery 04/03/23   Referring Physician Agustin Siu MD   Patient/Family Therapy Goal Statement (SLP) None stated   Pertinent History of Current Problem The pt is an 82 year old male with PMH of COPD on chronic oxygen, hx CVA, chronic indwelling chirinos catheter, DM2, HTN, HLD, BPH, depression, neuropathy, spinal stenosis, hx GIB, wheechair bound, who is being admitted for acute hypoxic respiratory failure. Patient presents from [facility] term care [facility] with hypoxia, he reports two days of malaise and sore throat. Reports he is chronically on 1.5L. Noted sating 90% on 15L in the ED and switched over to BiPap. He looks comfortable. CXR unremarkable. CT PE negative for PE but does show chronic COPD changes and some L > R basilar infiltrates concerning for pneumonia. Noted leukocytosis 15k. NT pro BNP is not high, he does not have a cardiac history, most likely he is somewhat volume down, given 1L IVF in the ED. Overall clinical appearance appears most consistent with pneumonia, possibly aspiration, treat and monitor for improvement. Clinical swallow evaluation ordered per MD.   General Observations The pt is alert, pleasant, and agreeable to evaluation. He is a reliable historian.   Pain Assessment   Patient Currently in Pain No   Type of Evaluation   Type of Evaluation Swallow Evaluation   Oral Motor   Oral Musculature anomalies present   Structural Abnormalities none present   Mucosal Quality dry   Dentition (Oral Motor)   Comment, Dentition (Oral Motor) Pt placed dentures for evaluation   Dentition (Oral Motor) edentulous;dental appliance/dentures   Dental Appliance/Denture (Oral Motor) upper and lower;dentures, full   Facial Symmetry (Oral Motor)   Facial Symmetry (Oral Motor) left side impairment   Left Side Facial Asymmetry minimal  impairment  (baseline)   Lip Function (Oral Motor)   Lip Range of Motion (Oral Motor) retraction impairment   Lip Coordination (Oral Motor) left side;minimal impairment   Retraction, Lip Range of Motion minimal impairment;left side   Tongue Function (Oral Motor)   Tongue Coordination/Speed (Oral Motor) reduced rate   Tongue ROM (Oral Motor) lateralization is impaired   Lateralization, Tongue ROM Impairment (Oral Motor) left side;minimal impairment   Jaw Function (Oral Motor)   Jaw Function (Oral Motor) WNL   Cough/Swallow/Gag Reflex (Oral Motor)   Volitional Throat Clear/Cough (Oral Motor) WNL   Volitional Swallow (Oral Motor) mildly delayed   Vocal Quality/Secretion Management (Oral Motor)   Vocal Quality (Oral Motor) breathy   Secretion Management (Oral Motor) WNL   General Swallowing Observations   Past History of Dysphagia The pt is familiar to inpatient SLP department. He has chronic pharyngeal dysphagia s/p CVA in 2018. Last VFSS was 2021 with recommendation for mildly thick liquids. He was last seen in September 2022 with recommendation for regular solids and mildly thick liquids. Ok for ice chips between meals. The pt reports that he avoids some foods such as peas and corn as they stick in throat - otherwise follows a regular diet. RN reports good tolerance of meds this AM, some coughing with mildly thick water and ice chips.   Respiratory Support (General Swallowing Observations) nasal cannula  (3 lpm)   Current Diet/Method of Nutritional Intake (General Swallowing Observations, NIS) NPO   Swallowing Evaluation Clinical swallow evaluation   Clinical Swallow Evaluation   Feeding Assistance frequent cues/help required  (d/t chronic left-sided weakness)   Clinical Swallow Evaluation Textures Trialed pureed;solid foods;mildly thick liquids   Clinical Swallow Eval: Mildly Thick Liquids   Mode of Presentation spoon;cup;fed by clinician;self-fed   Volume Presented 4 oz   Oral Phase WFL   Pharyngeal Phase intact    Diagnostic Statement No s/s of aspiration or difficulty. Pt took single and double swallows.   Clinical Swallow Evaluation: Puree Solid Texture Trial   Mode of Presentation, Puree spoon;self-fed   Volume of Puree Presented 3 oz   Oral Phase, Puree WFL   Pharyngeal Phase, Puree intact   Diagnostic Statement No s/s of aspiration or oral residuals   Clinical Swallow Evaluation: Solid Food Texture Trial   Mode of Presentation self-fed   Volume Presented 2 crackers   Oral Phase delayed AP movement   Pharyngeal Phase intact   Diagnostic Statement No s/s of aspiration, however mastication was mildly prolonged. Pt attempting to converse with food in mouth, but receptive to cues.   Esophageal Phase of Swallow   Patient reports or presents with symptoms of esophageal dysphagia No   Swallowing Recommendations   Diet Consistency Recommendations mildly thick liquids (level 2);regular diet   Supervision Level for Intake distant supervision needed  (1:1 assist with some feeding)   Mode of Delivery Recommendations slow rate of intake;no straws   Swallowing Maneuver Recommendations alternate food and liquid intake   Monitoring/Assistance Required (Eating/Swallowing) monitor for cough or change in vocal quality with intake   Recommended Feeding/Eating Techniques (Swallow Eval) maintain upright sitting position for eating;encourage use of dentures;set-up and prepare tray;provide assist with feeding   Medication Administration Recommendations, Swallowing (SLP) per pt preference   Instrumental Assessment Recommendations instrumental evaluation not recommended at this time   General Therapy Interventions   Planned Therapy Interventions Dysphagia Treatment   Dysphagia treatment Modified diet education;Instruction of safe swallow strategies   Clinical Impression   Criteria for Skilled Therapeutic Interventions Met (SLP Eval) Yes, treatment indicated   SLP Diagnosis Mild chronic dysphagia   Risks & Benefits of therapy have been explained  care plan/treatment goals reviewed;evaluation/treatment results reviewed;risks/benefits reviewed;current/potential barriers reviewed;participants voiced agreement with care plan;patient;participants included   Clinical Impression Comments Clinical swallow evaluation completed per MD order. The pt presents with mild chronic oropharyngeal dysphagia s/p CVA in 2018. The pt is familiar to inpatient s/p department and excellent recall of strategies/precautions. Oral mech significant for edentulous status with upper and lower full dentures. Left-sided lingual and labial weakness at baseline with minimal facial droop. Vocal quality is mildly breathy at baseline. The pt consumed mildly thick liquids, puree solids, and regular solids with no s/s of aspiration. He did require some encouragement to sit upright and chew/swallow food before engaging in conversation, however receptive to cues. Oral phase was mildly prolonged, but the pt cleared all minimal residuals with liquid wash. He had clear vocal quality and denied globus sensation. He has known hx of silent aspiration and is currently in hospital for acute pneumonia. Recommend regular solids with mildly thick liquids with use of strategies/precautions including: upright with PO, slow pace, alternate food/drink, no straws, use spoon with liquids for meals. OK for ice chips between meals after oral cares. Given dx, could consider VFSS if there is concern for silent aspiration. If pt does not follow strategies (i.e. use straws, eat in reclined position), he is at heightened risk for aspiration. SLP will follow.   SLP Total Evaluation Time   Eval: oral/pharyngeal swallow function, clinical swallow Minutes (29278) 20   SLP Discharge Planning   SLP Plan diet tolerance, at baseline diet - can d/c if no further concerns for aspiration   SLP Discharge Recommendation Long term care facility   SLP Rationale for DC Rec Suspect swallow function is at baseline, short course for SLP  strategies/monitoring given dx   SLP Brief overview of current status  Recommend regular solids with mildly thick liquids with use of strategies/precautions including: upright with PO, slow pace, alternate food/drink, no straws, use spoon with liquids for meals. OK for ice chips between meals after oral cares. Given dx, could consider VFSS if there is concern for silent aspiration. If pt does not follow strategies (i.e. use straws, eat in reclined position), he is at heightened risk for aspiration. SLP will follow.   Total Session Time   Total Session Time (sum of timed and untimed services) 20

## 2023-04-04 NOTE — PROGRESS NOTES
[facility]    Hospitalist Progress Note    Date of Admission:  4/3/2023    Assessment & Plan     Ron Gilmore is a 82 year old male with PMH of COPD on chronic oxygen, hx CVA, chronic indwelling chirinos catheter, DM2, HTN, HLD, BPH, depression, neuropathy, spinal stenosis, hx GIB, wheechair bound, who is being admitted for acute hypoxic respiratory failure.    Probable health care acquired pneumonia  Acute on chronic hypoxic respiratory failure  Sepsis  COPD on chronic oxygen 1.5L  Patient presents from long term care facility with hypoxia, he reports two days of malaise and sore throat. Reports he is chronically on 1.5L. Noted sating 90% on 15L in the ED and switched over to BiPap. He looks comfortable. CXR unremarkable. CT PE negative for PE but does show chronic COPD changes and some L > R basilar infiltrates concerning for pneumonia. Noted leukocytosis 15k. NT pro BNP is not high, he does not have a cardiac history, most likely he is somewhat volume down, given 1L IVF in the ED. Overall clinical appearance appears most consistent with pneumonia, possibly aspiration, treat and monitor for improvement.  - Weaned off Bipap, now stable on NC O2. Discontinued IMC.  - Zosyn and azithromycin started in the ED, continue  - Hold further IVF for now, also hold PTA lasix  - VBG with no significant CO2 retention  - Blood and urine cultures NGTD  - Continue PTA Duonebs TID  - PT/OT  - Given methylpred 125mg IV x1 in the ED, will hold further doses no acute wheezing noted  -Awaiting speech pathology evaluation.     Type 2 NSTEMI due to above  Initial troponin 26. EKG NSR.  - Serial trops flat  -Continue PTA aspirin, statin, metoprolol     DM2  Neuropathy   on admission. A1c 6.1% in 5/2022  - Sliding scale insulin  - Check A1c- 6.9 at admission     Hx CVA  Dysphagia  Reportedly has some chronic LUE weakness. He notes he is on a thickened liquid diet at his facility.  - Speech consult.  Concern  for aspiration     Chronic indwelling chirinos catheter  BPH  Has history of UTIs. UA appears dirty here not unexpected. Already covered by antibiotics above  - Follow urine culture  - Continue chirinos catheter here     Decubitus ulcers: unclear stage, reported per daughter  - WOC RN consult     HTN, HLD: Continue PTA statin, aspirin and metoprolol  Hx Gout: Continue PTA allopurinol  Depression: Continue PTA paxil     DVT Prophylaxis: Enoxaparin (Lovenox) SQ  Code Status: DNR.   - Patient indicated he was DNI to ED provider, then later indicated okay to intubate to admitting hospitalist.     Disposition: Expected discharge 1-2 days pending improvement in respiratory failure         Medical Decision Making               Clinically Significant Risk Factors           # Hypercalcemia: corrected calcium is >10.1, will monitor as appropriate    # Hypoalbuminemia: Lowest albumin = 3.2 g/dL at 4/3/2023 10:58 AM, will monitor as appropriate           # DMII: A1C = 6.9 % (Ref range: <5.7 %) within past 6 months, PRESENT ON ADMISSION            Jessica Bowles MD  Text Page (7am - 6pm, M-F)  [facility]  Securely message with the [employer] Web Console (learn more here)  Text page via [employer] Paging/Directory      Interval History   Stable overnight.  On nasal cannula oxygen.  Feels well.  Denies any shortness of breath, chest pain or significant cough.  Does note that he seems to have difficulty swallowing.    -Data reviewed today: I reviewed all new labs and imaging results over the last 24 hours. I personally reviewed chest x-ray, CT.    Physical Exam   Temp: 98.5  F (36.9  C) Temp src: Oral BP: 118/72 Pulse: 101   Resp: 20 SpO2: 94 % O2 Device: Nasal cannula Oxygen Delivery: 2 LPM  Vitals:    04/04/23 0600   Weight: 108.8 kg (239 lb 13.8 oz)     Vital Signs with Ranges  Temp:  [97.5  F (36.4  C)-98.9  F (37.2  C)] 98.5  F (36.9  C)  Pulse:  [] 101  Resp:  [14-20] 20  BP: ()/(63-83) 118/72  FiO2  (%):  [80 %] 80 %  SpO2:  [89 %-100 %] 94 %  I/O last 3 completed shifts:  In: 340 [P.O.:240; I.V.:100]  Out: 925 [Urine:925]    Constitutional: Alert, appears comfortable, in no acute distress, chronically ill-appearing, left-sided hemiparesis  Respiratory: Non labored breathing, clear anteriorly  Cardiovascular: Heart sounds regular rate and rhythm, no murmurs, trace leg edema  GI: Abdomen is soft, non distended, non tender. Normal BS  Neuro: alert, converses appropriately, left-sided hemiparesis  Psych: mood and affect appropriate      Medications       allopurinol  300 mg Oral QAM     ammonium lactate   Topical Daily     aspirin  81 mg Oral Daily     atorvastatin  10 mg Oral QAM     azithromycin  250 mg Oral Daily     enoxaparin ANTICOAGULANT  40 mg Subcutaneous Q24H     insulin aspart  1-7 Units Subcutaneous TID AC     insulin aspart  1-5 Units Subcutaneous At Bedtime     ipratropium  0.5 mg Nebulization TID    And     levalbuterol  1.25 mg Nebulization TID     metoprolol tartrate  12.5 mg Oral BID     pantoprazole  40 mg Oral BID     PARoxetine  50 mg Oral Daily     piperacillin-tazobactam  3.375 g Intravenous Q6H     sodium chloride (PF)  3 mL Intracatheter Q8H     umeclidinium-vilanterol  1 puff Inhalation Daily       Data   Recent Labs   Lab 04/04/23  1251 04/04/23  0537 04/04/23  0153 04/03/23  2223 04/03/23  1412 04/03/23  1120 04/03/23  1058   WBC  --  16.8*  --   --   --   --  15.2*   HGB  --  12.9*  --   --   --   --  13.7   MCV  --  99  --   --   --   --  99   PLT  --  165  --   --   --   --  175   NA  --  141  --   --   --   --  136   POTASSIUM  --  4.2  --   --   --   --  4.5   CHLORIDE  --  101  --   --   --   --  96*   CO2  --  29  --   --   --   --  29   BUN  --  23.0  --   --   --   --  18.5   CR  --  0.95  --   --  0.90 1.0 0.99   ANIONGAP  --  11  --   --   --   --  11   RAJ  --  9.7  --   --   --   --  9.2   * 158* 164*   < >  --   --  186*   ALBUMIN  --   --   --   --   --   --  3.2*    PROTTOTAL  --   --   --   --   --   --  7.3   BILITOTAL  --   --   --   --   --   --  0.8   ALKPHOS  --   --   --   --   --   --  76   ALT  --   --   --   --   --   --  17   AST  --   --   --   --   --   --  21    < > = values in this interval not displayed.       Imaging  No results found for this or any previous visit (from the past 24 hour(s)).

## 2023-04-04 NOTE — CONSULTS
[facility]  WO Nurse Inpatient Assessment     Consulted for: Decub Ulcers    Patient History (according to provider note(s):    Ron Gilmore is a 82 year old male with PMH of COPD on chronic oxygen, hx CVA, chronic indwelling chirinos catheter, DM2, HTN, HLD, BPH, depression, neuropathy, spinal stenosis, hx GIB, wheechair bound, who is being admitted for acute hypoxic respiratory failure.     Areas Assessed:      Areas visualized during today's visit: Sacrum/coccyx and ears    Skin Injury Location: BL buttock/Coccyx    Last photo: 4/4/23          Skin injury due to: Mixed Etiology: Chronic skin injury (often related to prolonged recliner use), Friction, Incontinence associated dermatitis (IAD) and DTPI to BL coccyx POA  Skin history and plan of care:   Pt reports significant amt of sitting and states his bottom does get sore, but he was not aware of any open areas. Pt able to turn independently onto his left side. Due to respiratory requirements pt sitting up higher   Affected area:      Skin assessment:BL coccyx with non blanchable maroon/purple erythema with extensive venous congestion to surrounding tissue. Very macerated tissue to midline coccyx. Scant peeling epidermal erosions.      Measurements (length x width x depth, in cm) 7  x 11  x  0 cm      Color: pink, purple and maroon     Temperature  normal      Drainage: none .      Color: none      Odor: none  Pain: denies , none  Pain interventions prior to dressing change: N/A  Treatment goal: Decrease moisture, Maintain (prevention of deterioration) and Protection  STATUS: initial assessment  Supplies ordered: at bedside    Pressure Injury Location: BL ears    Last photo: 4/4/23      Right Ear 4/4          Left Ear 4/4      Wound type: Pressure Injury     Pressure Injury Stage: 2, present on admission to  tops of ears due to chronic use of oxygen tubing     This is a Medical Device Related Pressure Injury (MDRPI) due to oxygen  tubing  Wound history/plan of care:  Pt with chronic wounds to tops and lateral (helix) with open and scabbed areas.    Wound base: Top of right and Left ear 100% pink dermis, L helix 90% dried crusty drainage and 10% pink dermis      Palpation of the wound bed: normal      Drainage: scant     Description of drainage: serous     Measurements (length x width x depth, in cm) Top of ears both approx 0.5 x 0.5 x 0.1cm and L outer 3 small open areas less than 0.5 x 0.5 x 0.1 cm     Periwound skin: Intact      Color: normal and consistent with surrounding tissue      Temperature: normal   Odor: none  Pain: mild, tender  Pain intervention prior to dressing change: slow and gentle cares   Treatment goal: Heal   STATUS: initial assessment  Supplies ordered: at bedside    Treatment Plan:     BL buttock wound(s): every three days and PRN  1. Clean wound with saline or MicroKlenz Spray, pat dry  2. Wipe entire coccyx/buttock area with Cavilon No Sting Skin Prep #[id number] and allow to dry. This will help protect periwound and help dressing adherence  3. Press a Mepilex  Sacral Dressing (PS#[id number])  to the area, making sure to conform nicely to skin curvatures.   4. Time and date dressing change  NOTE  -Reposition pt side to side sheila when in bed, every 2 hours-get the pt way over on side to completely offload pressure. This will benefit skin and respiratory function   -Keep heels elevated and floating on pillows at all times. Try using at least 2 pillows under each calf.  -When up to the chair pt needs to fully offload every 2 hours and use a chair cushion if needed       BL ears: Daily  1. Cleanse with wound cleanser and blot dry  2. Apply a thin layer of vaseline to wounds on both ears  3. Apply 1/2 Mepilex wrapped around oxygen tubing to offload        Orders: Written    RECOMMEND PRIMARY TEAM ORDER: None, at this time  Education provided: importance of repositioning, plan of care, Moisture management and Off-loading  pressure  Discussed plan of care with: Patient and Nurse  WOC nurse follow-up plan: weekly  Notify WOC if wound(s) deteriorate.  Nursing to notify the Provider(s) and re-consult the WOC Nurse if new skin concern.    DATA:     Current support surface: Standard  Low air loss mattress  Containment of urine/stool: Incontinence Protocol  BMI: Body mass index is 32.53 kg/m .   Active diet order: Orders Placed This Encounter      Combination Diet Regular Diet; Moderate Consistent Carb (60 g CHO per Meal) Diet; Mildly Thick (level 2); No Straws     Output: I/O last 3 completed shifts:  In: 1000 [IV Piggyback:1000]  Out: 725 [Urine:725]     Labs:   Recent Labs   Lab 04/04/23  0537 04/03/23  1058   ALBUMIN  --  3.2*   HGB 12.9* 13.7   WBC 16.8* 15.2*   A1C  --  6.9*     Pressure injury risk assessment:   Sensory Perception: 3-->slightly limited  Moisture: 4-->rarely moist  Activity: 2-->chairfast  Mobility: 2-->very limited  Nutrition: 3-->adequate  Friction and Shear: 1-->problem  Lon Score: 15    Ron Grant RN CWOCN  -Securely message with [employer] (more info) - can reach individually by name or search 'WOC Nurse' (Corrina) to reach all current WOCs on duty.  WO Office Phone: [phone]

## 2023-04-05 ENCOUNTER — APPOINTMENT (OUTPATIENT)
Dept: SPEECH THERAPY | Facility: CLINIC | Age: 83
DRG: 871 | End: 2023-04-05
Payer: MEDICARE

## 2023-04-05 LAB
BACTERIA UR CULT: ABNORMAL
BASE EXCESS BLDV CALC-SCNC: 7.5 MMOL/L (ref -7.7–1.9)
BASE EXCESS BLDV CALC-SCNC: 7.8 MMOL/L (ref -7.7–1.9)
GLUCOSE BLDC GLUCOMTR-MCNC: 112 MG/DL (ref 70–99)
GLUCOSE BLDC GLUCOMTR-MCNC: 134 MG/DL (ref 70–99)
GLUCOSE BLDC GLUCOMTR-MCNC: 138 MG/DL (ref 70–99)
GLUCOSE BLDC GLUCOMTR-MCNC: 143 MG/DL (ref 70–99)
HCO3 BLDV-SCNC: 33 MMOL/L (ref 21–28)
HCO3 BLDV-SCNC: 35 MMOL/L (ref 21–28)
LACTATE SERPL-SCNC: 1.1 MMOL/L (ref 0.7–2)
LACTATE SERPL-SCNC: 1.5 MMOL/L (ref 0.7–2)
LACTATE SERPL-SCNC: 1.7 MMOL/L (ref 0.7–2)
O2/TOTAL GAS SETTING VFR VENT: 45 %
O2/TOTAL GAS SETTING VFR VENT: 45 %
OXYHGB MFR BLDV: 26 % (ref 70–75)
OXYHGB MFR BLDV: 88 % (ref 70–75)
PCO2 BLDV: 50 MM HG (ref 40–50)
PCO2 BLDV: 63 MM HG (ref 40–50)
PH BLDV: 7.36 [PH] (ref 7.32–7.43)
PH BLDV: 7.43 [PH] (ref 7.32–7.43)
PO2 BLDV: 20 MM HG (ref 25–47)
PO2 BLDV: 54 MM HG (ref 25–47)
POTASSIUM SERPL-SCNC: 4.2 MMOL/L (ref 3.4–5.3)
PROCALCITONIN SERPL IA-MCNC: 0.13 NG/ML

## 2023-04-05 PROCEDURE — 84132 ASSAY OF SERUM POTASSIUM: CPT | Performed by: HOSPITALIST

## 2023-04-05 PROCEDURE — 99233 SBSQ HOSP IP/OBS HIGH 50: CPT | Performed by: INTERNAL MEDICINE

## 2023-04-05 PROCEDURE — 83605 ASSAY OF LACTIC ACID: CPT | Performed by: HOSPITALIST

## 2023-04-05 PROCEDURE — 36415 COLL VENOUS BLD VENIPUNCTURE: CPT

## 2023-04-05 PROCEDURE — 83605 ASSAY OF LACTIC ACID: CPT | Performed by: INTERNAL MEDICINE

## 2023-04-05 PROCEDURE — 36415 COLL VENOUS BLD VENIPUNCTURE: CPT | Performed by: INTERNAL MEDICINE

## 2023-04-05 PROCEDURE — 250N000009 HC RX 250: Performed by: INTERNAL MEDICINE

## 2023-04-05 PROCEDURE — 250N000013 HC RX MED GY IP 250 OP 250 PS 637: Performed by: INTERNAL MEDICINE

## 2023-04-05 PROCEDURE — 120N000013 HC R&B IMCU

## 2023-04-05 PROCEDURE — 250N000011 HC RX IP 250 OP 636: Performed by: INTERNAL MEDICINE

## 2023-04-05 PROCEDURE — 94640 AIRWAY INHALATION TREATMENT: CPT | Mod: 76

## 2023-04-05 PROCEDURE — 36415 COLL VENOUS BLD VENIPUNCTURE: CPT | Performed by: HOSPITALIST

## 2023-04-05 PROCEDURE — 82805 BLOOD GASES W/O2 SATURATION: CPT

## 2023-04-05 PROCEDURE — 94640 AIRWAY INHALATION TREATMENT: CPT

## 2023-04-05 PROCEDURE — 999N000157 HC STATISTIC RCP TIME EA 10 MIN

## 2023-04-05 PROCEDURE — 84145 PROCALCITONIN (PCT): CPT | Performed by: INTERNAL MEDICINE

## 2023-04-05 PROCEDURE — 92526 ORAL FUNCTION THERAPY: CPT | Mod: GN

## 2023-04-05 RX ORDER — QUETIAPINE FUMARATE 25 MG/1
25 TABLET, FILM COATED ORAL AT BEDTIME
Status: DISCONTINUED | OUTPATIENT
Start: 2023-04-05 | End: 2023-04-06

## 2023-04-05 RX ADMIN — PIPERACILLIN AND TAZOBACTAM 3.38 G: 3; .375 INJECTION, POWDER, FOR SOLUTION INTRAVENOUS at 12:22

## 2023-04-05 RX ADMIN — Medication: at 10:50

## 2023-04-05 RX ADMIN — LEVALBUTEROL HYDROCHLORIDE 1.25 MG: 1.25 SOLUTION RESPIRATORY (INHALATION) at 19:35

## 2023-04-05 RX ADMIN — ALLOPURINOL 300 MG: 300 TABLET ORAL at 10:46

## 2023-04-05 RX ADMIN — ACETAMINOPHEN 650 MG: 325 TABLET, FILM COATED ORAL at 18:52

## 2023-04-05 RX ADMIN — METOPROLOL TARTRATE 12.5 MG: 25 TABLET, FILM COATED ORAL at 21:07

## 2023-04-05 RX ADMIN — PIPERACILLIN AND TAZOBACTAM 3.38 G: 3; .375 INJECTION, POWDER, FOR SOLUTION INTRAVENOUS at 18:51

## 2023-04-05 RX ADMIN — PANTOPRAZOLE SODIUM 40 MG: 40 TABLET, DELAYED RELEASE ORAL at 10:46

## 2023-04-05 RX ADMIN — IPRATROPIUM BROMIDE 0.5 MG: 0.5 SOLUTION RESPIRATORY (INHALATION) at 08:54

## 2023-04-05 RX ADMIN — PAROXETINE HYDROCHLORIDE 50 MG: 10 TABLET, FILM COATED ORAL at 10:46

## 2023-04-05 RX ADMIN — UMECLIDINIUM BROMIDE AND VILANTEROL TRIFENATATE 1 PUFF: 62.5; 25 POWDER RESPIRATORY (INHALATION) at 10:51

## 2023-04-05 RX ADMIN — INSULIN ASPART 1 UNITS: 100 INJECTION, SOLUTION INTRAVENOUS; SUBCUTANEOUS at 18:52

## 2023-04-05 RX ADMIN — IPRATROPIUM BROMIDE 0.5 MG: 0.5 SOLUTION RESPIRATORY (INHALATION) at 19:35

## 2023-04-05 RX ADMIN — PIPERACILLIN AND TAZOBACTAM 3.38 G: 3; .375 INJECTION, POWDER, FOR SOLUTION INTRAVENOUS at 05:15

## 2023-04-05 RX ADMIN — ASPIRIN 81 MG: 81 TABLET, COATED ORAL at 10:46

## 2023-04-05 RX ADMIN — PANTOPRAZOLE SODIUM 40 MG: 40 TABLET, DELAYED RELEASE ORAL at 21:07

## 2023-04-05 RX ADMIN — ENOXAPARIN SODIUM 40 MG: 40 INJECTION SUBCUTANEOUS at 18:52

## 2023-04-05 RX ADMIN — LEVALBUTEROL HYDROCHLORIDE 1.25 MG: 1.25 SOLUTION RESPIRATORY (INHALATION) at 08:53

## 2023-04-05 RX ADMIN — ATORVASTATIN CALCIUM 10 MG: 10 TABLET, FILM COATED ORAL at 10:46

## 2023-04-05 RX ADMIN — AZITHROMYCIN MONOHYDRATE 250 MG: 250 TABLET ORAL at 10:46

## 2023-04-05 RX ADMIN — QUETIAPINE FUMARATE 25 MG: 25 TABLET ORAL at 21:07

## 2023-04-05 RX ADMIN — METOPROLOL TARTRATE 12.5 MG: 25 TABLET, FILM COATED ORAL at 10:46

## 2023-04-05 ASSESSMENT — ACTIVITIES OF DAILY LIVING (ADL)
ADLS_ACUITY_SCORE: 39
ADLS_ACUITY_SCORE: 41
ADLS_ACUITY_SCORE: 39

## 2023-04-05 NOTE — PROVIDER NOTIFICATION
MD Notification    Notified Person: MD    Notified Person Name: Romeo Claire    Notification Date/Time:    Notification Interaction: FYI: Pt lactic 2.2. WBC 16.6 sepsis protocol fired again. Given Tylenol for 100.6 fever at 2125. Last temp 99.1. -110. Other vitals stable. Pt disoriented place/situation. Hallucinations.    Purpose of Notification: Update    Orders Received:    Comments:

## 2023-04-05 NOTE — CODE/RAPID RESPONSE
"Sepsis Evaluation     I was called to see Ron Gilmore due to a change in vital signs and lactic acid level of 3.3. He is known to have an infection.     PHYSICAL EXAM  Vital Signs:  Temp: (!) 100.8  F (38.2  C) Temp src: Oral BP: 122/85 Pulse: 105   Resp: 22 SpO2: 96 % O2 Device: High Flow Nasal Cannula (HFNC) (95% 45 L HFNC) Oxygen Delivery: 15 LPM    General: acutely ill appearing  Mental Status: AAOx4.     Remainder of physical exam is significant for bilateral rales in lungs. This was following an aspiration event with patient's dinner. Patient became hypoxic and required HFNC after acute hypoxic respiratory failure.    DATA  Lactic Acid   Date Value Ref Range Status   04/04/2023 3.3 (H) 0.7 - 2.0 mmol/L Final   04/04/2023 3.0 (H) 0.7 - 2.0 mmol/L Final   05/18/2021 1.7 0.7 - 2.1 mmol/L Final   05/10/2021 1.7 0.7 - 2.0 mmol/L Final     Lactate for Sepsis Protocol   Date Value Ref Range Status   05/30/2021 1.2 0.7 - 2.0 mmol/L Final   05/19/2021 1.3 0.7 - 2.0 mmol/L Final       ASSESSMENT AND PLAN  Ron Gilmore meets SIRS criteria AND has a lactate >2 or other evidence of acute organ damage.  These vital signs, lab and physical exam findings constitute a diagnosis of SEVERE SEPSIS, based on: Lactate resulted, and the level was > 2.0 and Acute Resp Failure requiring BiPAP or Mechanical Vent    CXR, VBG, CBC, repeat lactic      Anti-infectives (From now, onward)    Start     Dose/Rate Route Frequency Ordered Stop    04/04/23 0800  azithromycin (ZITHROMAX) tablet 250 mg         250 mg Oral DAILY 04/03/23 1408 04/08/23 0759    04/03/23 1800  piperacillin-tazobactam (ZOSYN) 3.375 g vial to attach to  mL bag        Note to Pharmacy: For [facility], [facility] and [facility]: For Zosyn-naive patients, use the \"Zosyn initial dose + extended infusion\" order panel.    3.375 g  over 30 Minutes Intravenous EVERY 6 HOURS 04/03/23 1402          Current antibiotic coverage is appropriate for source of infection.    3 Hour Severe Sepsis " Bundle Completion:  1. Initial Lactic Acid result shown above. Repeat lactic acid ordered by reflex for 3 hours from initial collection.  2. Blood Cultures before Antibiotics: Yes  3. Broad Spectrum Antibiotics Administered: yes  4. Is hypotension present? No (IV fluid bolus NOT required). IV Fluid volume administered: 2000 ml    Disposition: The patient will remain on the current unit. We will continue to monitor this patient closely..  ELIZABETH Hernandez CNP  04/04/23, 7:57 PM    Sepsis Criteria   Sepsis: The body's generalized inflammatory state as a response to an infection. Sepsis Predictive Model includes >80 variable to alert to potential sepsis.  Severe Sepsis: Sepsis plus one or more variables of acute organ dysfunction (Note: lactic acid >2 or acute encephalopathy each qualify as organ dysfunction)  Septic Shock: Sepsis AND hypotension despite adequate volume resuscitation with crystalloid or lactic acid >=4  Note: HYPOTENSION is defined as 2 BP readings measured 3 hrs apart that have a SBP <90, MAP <65, or decrease >40 mmHg, occurring 6 hrs before or after t-zero

## 2023-04-05 NOTE — PROGRESS NOTES
RT overnight report:    Pt on HFNC overnight. Now weaned to 45L 45%. SpO2 95-98%. No issues overnight. RT to follow    Phan Sanchez, RT  04/05/23  5:17 AM     Transplant Admission H&P:    Date: 10/28/2017    CC/Reason for Admission: Fevers, rigors, recent transplant.     HPI:  Rishabh Cruz is a 50 year old male with a H/O EtOH cirrhosis S/P OLT (DBD) 9/14/17. Post-op. course complicated by hepatic hematoma and bx proven mild-moderate rejection (9/25/17). Patient was treated with Solumedrol 500 mg QOD x 3 days and an increase in his Prograf trough goal to 10-12 ng/mL. Additonal PMH significant for HTN.      Patient recently admitted to [facility] through the ED 10/17/17 with C/O N/V, decreased appetite, constipation, and worsening abdominal pain. Patient was evaluated in the clinic earlier in the day and his biliary drain was D/C'd without difficulty. CT in the ED without acute findings. Patient pan-cultured, all results normal. Pain Management consulted and followed throughout hospitalization. Patient vomited on 10/19 in which abdominal series obtained with concern for SBO vs ileus. Patient given Relistor and NG tube placed, NPO.  Evening on 10/19, patient had large BM with significant improvement in abdominal pain. 10/20/17 NG tube removed, patient tolerated regular diet and discharge home in stable condition.    Patient presented to [facility] ED 10/28/17 due to weakness, fevers, chills, and joint pain. He reports a fever of 100.5 at home. He reports that he feels \"aweful\" like he has a terrible flu. He reports that this is unusual for him because he never gets sick. Patient has not travelled and he has no sick contacts. He has not had any nausea or vomiting and has been able to eat, take his medications, and drink fluids. No constipation or diarrhea. Patient denies any change to his incision or new pain or discharge. Patient pan-cultured including transplant respiratory pathogen and admitted for further evaluation and management.      Patient Active Problem List    Diagnosis Date Noted   • Liver transplanted ([facility] / [facility], 9/14/17) 09/14/2017     Priority: Low   •  Immunosuppression (CMS/HCC) 09/14/2017     Priority: Low   • Hyponatremia 09/04/2017     Priority: Low   • Elevated troponin 09/04/2017     Priority: Low   • Alcoholic liver disease (CMS/HCC) 07/03/2017     Priority: Low   • Anemia 07/03/2017     Priority: Low   • Abdominal distention 06/28/2017     Priority: Low   • Elevated LFTs 06/28/2017     Priority: Low   • Hyperbilirubinemia 06/28/2017     Priority: Low   • Alcohol abuse 06/28/2017     Priority: Low       Past Medical History:   Diagnosis Date   • Cirrhosis of liver (CMS/HCC)    • Fracture        Past Surgical History:   Procedure Laterality Date   • BACK SURGERY     • FRACTURE SURGERY     • IR PARACENTESIS  06/28/2017 7/21/17, 08/23/2018, 08/30/2017, 09/06/2017, 9/13/2017   • SHOULDER SURGERY         Prior to Admission medications    Medication Sig Start Date End Date Taking? Authorizing Provider   tacrolimus (PROGRAF) 0.5 MG capsule Take 0.5 mg by mouth 2 times daily. Take with (5) 1mg cap prograf for total dose 5.5mg   Yes Outside Provider   tacrolimus (PROGRAF) 1 MG capsule Take 5 mg by mouth 2 times daily. 5 caps (=5mg)  Take with (1) 0.5mg cap prograf for total dose 5.5mg   Yes Outside Provider   pantoprazole (PROTONIX) 40 MG tablet Take 40 mg by mouth daily.   Yes Outside Provider   megestrol (MEGACE) 40 MG/ML suspension Take 10 mLs by mouth daily.  Patient taking differently: Take 400 mg by mouth daily as needed.  10/17/17  Yes Zack Kate MD   predniSONE (DELTASONE) 5 MG tablet Take 1 tablet by mouth daily. 10/11/17  Yes Kaur Lee MD   aspirin 81 MG EC tablet Take 1 tablet by mouth daily. 10/2/17  Yes Fortunato Grande NP   dapsone 100 MG tablet Take 1 tablet by mouth daily. 10/2/17  Yes Fortunato Grande NP   ursodiol (ACTIGALL) 500 MG tablet Take 1 tablet by mouth 2 times daily (with meals). 10/1/17  Yes Fortunato Grande NP   docusate sodium-sennosides (SENOKOT S) 50-8.6 MG per tablet Take 1 tablet by mouth nightly.  Patient taking  differently: Take 1 tablet by mouth nightly as needed for Constipation.  9/20/17  Yes VINH Wayne   hydroCORTisone (ANUSOL-HC) 25 MG suppository Place 1 suppository rectally 2 times daily as needed for Hemorrhoids. 9/20/17  Yes VINH Wayne   mycophenolate (CELLCEPT) 500 MG tablet Take 2 tablets by mouth twice daily. 9/15/17  Yes VINH Wayne   valGANciclovir (VALCYTE) 450 MG tablet Take 2 tablets by mouth daily. 9/15/17  Yes VINH Wayne   melatonin 3 MG Take 1 tablet by mouth nightly as needed (insomnia). 9/6/17  Yes Anoop Chang MD   ondansetron (ZOFRAN ODT) 4 MG disintegrating tablet Take 1 tablet by mouth every 6 hours.  Patient taking differently: Take 4 mg by mouth every 6 hours as needed.  8/12/17  Yes CAT Braden   cholecalciferol (VITAMIN D3) 1000 UNITS tablet Take 2 tablets by mouth daily. 7/14/17  Yes Dav Guzman MD   tacrolimus (PROGRAF) 1 MG capsule Take 5.5 mg twice daily 10/27/17 10/28/17  Laxmi Us MD   tacrolimus (PROGRAF) 0.5 MG capsule Take as directed 10/27/17 10/28/17  Laxmi Us MD   OxyCODONE HCl 10 MG immediate release tablet Take 1 tablet by mouth every 4 hours as needed (pain). 10/10/17 10/28/17  CAT Lancaster   amLODIPine (NORVASC) 10 MG tablet Take 1 tablet by mouth daily. 10/2/17   Fortunato Grande NP   traMADol (ULTRAM) 50 MG tablet Take 2 tablets by mouth every 6 hours. 10/1/17 10/28/17  Fortunato Grande NP       ALLERGIES:  No Known Allergies    Social History   Substance Use Topics   • Smoking status: Never Smoker   • Smokeless tobacco: Never Used   • Alcohol use No      Comment: quit  last drink 6/26/17       Family History   Problem Relation Age of Onset   • Heart disease Mother    • Cancer Mother      lung   • Diabetes Father    • Substance abuse Father    • Cancer Maternal Aunt    • Cancer Maternal Grandmother        Review of Systems  Constitutional:  Positive for chills, fatigue and fevers  Ears, nose, mouth, throat, and  face:  negative for nasal congestion and sore throat  Respiratory:  negative for cough, dyspnea on exertion and sputum  Cardiovascular:  negative for chest pain, irregular heart beat  Gastrointestinal:  negative for new abdominal pain, change in bowel habits, constipation, diarrhea, hematemesis, hematochezia, melena, nausea and vomiting  Genitourinary:  negative for dysuria  Integument/breast:  negative for rash and skin lesion(s)  Musculoskeletal:  Positive for muscle weakness and myalgias  Neurological:  negative for altered mental status, headache and dizziness    Objective:     Vitals:    10/28/17 1141   BP: (!) 140/94   Pulse: 106   Resp: 20   Temp: 98.3 °F (36.8 °C)     Physical Exam  General- Awake/alert in NAD. Calm/cooperative. Appears fatigued. + chills.  Skin- Warm and dry to touch. No rashes/lesions. No jaundice.   Head- Atraumatic and normocephalic.   Eyes- No scleral icterus. EOMs intact.   Throat/Neck- Neck without masses, trachea midline and supple.  CV- S1S2, RRR. No murmurs, rubs or gallops to auscultation.  Pulmonary- Clear breath sounds to auscultation. On RA. Normal Respiratory Effort.  Abdomen- Round, soft, non-tender, and non-distended with palpation. + BS. Stella incision with small area of dehiscence on the right lateral aspect. No erythema or drainage.   Extremities- No erythema. Trace LE edema.   Musculoskeletal- MAETC.  Neuro-  No focal deficits. Strength and sensation grossly intact. Speech clear.  Psych- Alert and oriented to person, place, and time. Mood and affect appropriate.     Diagnostics/Imaging:  Reviewed previous imaging.   CXR, UA ordered.     Assessment/Plan  1. H/O EtOH cirrhosis S/P OLT (DBD) 9/14/17. POD 44.  -- Liver bx 9/25/17 noted mild-moderate rejection.  -- Treated with Solumedrol 500 mg QOD x 3 days. Immunosuppression as below.   -- DSA negative (9/26/17).  -- Biliary drain D/C'd 10/17/17, admitted post-removal for abdominal pain without findings of leak. Cultures  negative.   -- Continue ASA and Ursodiol 500 mg BID.  -- Patient participating in Relapse Prevention.   2. Prophylactic immunosuppression.  -- Continue Prednisone 5 mg QD x 3 months given findings of ACR.   -- Cellcept 1 g BID x 3 months given ACR (will re-evaluate at 3 month jose raul).   -- Prograf level sub-therapeutic at home. On Prograf 5.5mg BID.  Trough goal 10-12 ng/mL.   3. Pain management.   -- Continue tramadol 50 mg every 6 hours PRN.   -- Continue Senokot S HS and Miralax 17 g QD for constipation prophylaxis.   -- Received Relistor 10/19 and 10/20.   4. Transplant prophylaxis.              -- PCP. Dapsone 100 mg QD x 3 months (end date 12/14/17).  -- CMV (D+/R-). Valcyte 900 mg QD x 3 months (end date 12/14/17). CMV quant. not detected (10/16/17).  -- GI. Protonix 40 mg HS.   5. HTN. Norvasc 10 mg QD.  6. Fever, Chills, fatigue.    -- No Leukocytosis. LFTs stable.    -- Pan-cultured.    -- CMV, EBV, and transplant respiratory panel ordered.    -- PCT 0.05. Monitor off Abx.   7. Prophylaxis. SCDs and TEDs  8. Disposition. Admitted to observation. Tentative discharge 1-2 days pending labs, cultures, and clinical course.      Patient seen and examined, agree with exam, assessment and plan as outlined above  Admitted for fever and chills at home   Reports arthralgia and weakness   VSS  Abdomen soft and not tender   Labs reviewed   Plan as above    Kaur Lee MD

## 2023-04-05 NOTE — PROGRESS NOTES
[facility]    HOSPITALIST PROGRESS NOTE :   --------------------------------------------------    Date of Admission:  4/3/2023    Cumulative Summary: Ron Gilmore is a 82 year old male with PMH of COPD on chronic oxygen, hx CVA, chronic indwelling chirinos catheter, DM2, HTN, HLD, BPH, depression, neuropathy, spinal stenosis, hx GIB, wheechair bound, who is being admitted for acute hypoxic respiratory failure on 4/3/2023.     Assessment & Plan      Acute on chronic hypoxic respiratory failure; most likely secondary to pneumonia  Patient is a resident of long-term care facility, chronically on 1.5 L of oxygen, was noted to be saturating 90% on 15 L initially in ED and was switched over to BiPAP.  Chest x-ray was unremarkable  CT PE was negative for pulmonary embolism but did show chronic COPD changes and some left basilar infiltrates more than the right side concerning for bilateral pneumonia  Patient also had leukocytosis with WBC count of 15 K  BNP was not elevated  Concern for healthcare acquired pneumonia: as resident of [facility], bilateral infiltrates, unknown organism  Severe sepsis  History of COPD on chronic oxygen 1.5 L    -- Patient was [facility] [place] [facility] [place] for further evaluation and management  -- Patient was a started on IV Zosyn and IV azithromycin  -- Patient was given initially 1 L of IV fluid in the ED, PTA Lasix was held  -- PTA DuoNebs 3 times daily ordered  -- Patient also received methylprednisolone 125 mg IV x1 in ED, not continued on admission due to no acute bronchospasm  -- On 04/04/2023, patient initially was able to be weaned off from BiPAP, was on nasal cannula  -- Speech therapy evaluation was requested, has been known to them for chronic mild dysphagia, which he has since CVA in 2018.  Recommended regular solids with mildly thick liquids with use of aspiration precautions.  Patient remains at increased risk for aspiration  -- Recommending  consideration of VFSS if there is concern for silent aspiration.  -- Patient repeat lactic acid came back elevated around 3.0, patient was given normal saline bolus 500 ml  -- Patient later developed worsening of respiratory status, started to require 8 L of oxygen with increased congestion and crackles on examination, RRT was activated  -- Repeat chest x-ray from 8 PM last night showed increased streaky right infrahilar opacities corresponding to previously noted peribronchial thickening, retained secretions and associated bronchopneumonia.  -- Appreciate RT evaluation, patient was placed on HFNC overnight has been weaned down to 45 L this morning at 5 AM from 80%  --Telemetry was reviewed showing sinus tachycardia with occasional PVCs  -- Lactic acidosis is resolved  -- Continue current antibiotics  -- VBG reviewed from this morning, showing pH of 7.36, PCO2 of 63, PO2 of 20 and bicarb of 35  -- On examination, patient does not have any significant bronchospasm, has been a started on general diet with thickened liquid this morning by speech therapy, will not order any further IV fluids and IV Solu-Medrol at this point.  -- Will start patient on Seroquel 25 mg at bedtime for few nights to help with acute illness delirium as patient was having hallucinations last night, will monitor for signs of somnolence closely.    Elevated troponin: Secondary to supply demand ischemia from sepsis, no signs of ACSInitial troponin 26. EKG NSR.    -- Serial trops flat  --Continue PTA aspirin, statin, metoprolol     DM2  Neuropathy   on admission. A1c 6.1% in 5/2022  -- Sliding scale insulin  -- Check A1c- 6.9 at admission     Hx CVA  Dysphagia  Reportedly has some chronic LUE weakness. He notes he is on a thickened liquid diet at his facility.  --As above, patient has been evaluated by speech therapy     Chronic indwelling chirinos catheter  BPH  Has history of UTIs. UA appears dirty as unexpected. Already covered by antibiotics  above  -- Follow urine culture  -- Continue chirinos catheter here     Decubitus ulcers: unclear stage, reported per daughter  -- [facility] RN consult     HTN, HLD: Continue PTA statin, aspirin and metoprolol  Hx Gout: Continue PTA allopurinol  Depression: Continue PTA paxil    Clinically Significant Risk Factors              # Hypoalbuminemia: Lowest albumin = 3.2 g/dL at 4/3/2023 10:58 AM, will monitor as appropriate          # DMII: A1C = 6.9 % (Ref range: <5.7 %) within past 6 months, PRESENT ON ADMISSION          Diet: Snacks/Supplements Adult: Glucerna; With Meals  Room Service  Regular Diet Adult Mildly Thick (level 2)    Chirinos Catheter: PRESENT, indication: Retention  DVT Prophylaxis: Enoxaparin (Lovenox) SQ  Code Status: No CPR- Pre-arrest intubation OK    The patient's care was discussed with the Bedside Nurse and Patient.    Medical Decision Making   50 min were spent in total patient care today including the floor time , more than 50% of the time was spent in patient care coordination and counseling.    Disposition Plan  Patient will most likely return to his long-term care facility, expecting patient to be in the hospital for the next couple of nights.     Expected Discharge Date: 04/07/2023,  3:00 PM    Destination: assisted living  Discharge Comments: [facility]     Entered: Sue Estevez MD 04/05/2023, 3:44 PM       Sue Estevez MD, MD, FACP  Text Page (7am - 6pm)    ----------------------------------------------------------------------------------------------------------------------      Interval History   Patient care was assumed this morning, patient was seen and examined.  Patient had developed significant hypoxia the evening before and required placement of high flow nasal cannula with 80% FiO2 from 2 L nasal cannula.  Patient repeat chest x-ray did not show any significant pulmonary edema but was concerning for worsening pneumonia.  Patient has been continued on IV antibiotics, now has been able to get  weaned down to 40% FiO2 and 40 L oxygen this morning, appreciate RT help.  Patient does not remember any incident from yesterday evening, was hallucinating last night, was cooperative this morning on examination.  I reviewed his current ongoing plan with patient and bedside nursing in detail    -Data reviewed today: I reviewed all new labs and imaging results over the last 24 hours.    I personally reviewed the chest x-ray image(s) showing Worsening infiltrates and consolidation bilateral.    Physical Exam   Temp: 98.5  F (36.9  C) Temp src: Oral BP: 118/76 Pulse: 105   Resp: 23 SpO2: 93 % O2 Device: High Flow Nasal Cannula (HFNC) Oxygen Delivery: 45 LPM  Vitals:    04/04/23 0600   Weight: 108.8 kg (239 lb 13.8 oz)     Vital Signs with Ranges  Temp:  [97.6  F (36.4  C)-100.8  F (38.2  C)] 98.5  F (36.9  C)  Pulse:  [] 105  Resp:  [12-28] 23  BP: (109-153)/(56-90) 118/76  FiO2 (%):  [45 %-95 %] 50 %  SpO2:  [90 %-98 %] 93 %  I/O last 3 completed shifts:  In: 2960 [P.O.:760; I.V.:2200]  Out: 950 [Urine:950]    GENERAL: Alert , awake and oriented to person and place, slightly confused, looks chronically sick, wearing HFNC NAD. Conversational, appropriate.   HEENT: Normocephalic. EOMI. No icterus or injection. Nares normal.   LUNGS: Clear to auscultation. No dyspnea at rest.  Decreased air entry in bases, no bronchospasm or wheezing  HEART: Regular rate. Extremities perfused.   ABDOMEN: Soft, nontender, and nondistended. Positive bowel sounds.   EXTREMITIES: No LE edema noted.   NEUROLOGIC: Moves extremities x4 on command. No acute focal neurologic abnormalities noted.     Medications     - MEDICATION INSTRUCTIONS -         allopurinol  300 mg Oral QAM     ammonium lactate   Topical Daily     aspirin  81 mg Oral Daily     atorvastatin  10 mg Oral QAM     azithromycin  250 mg Oral Daily     enoxaparin ANTICOAGULANT  40 mg Subcutaneous Q24H     insulin aspart  1-7 Units Subcutaneous TID AC     insulin aspart  1-5  Units Subcutaneous At Bedtime     ipratropium  0.5 mg Nebulization TID    And     levalbuterol  1.25 mg Nebulization TID     metoprolol tartrate  12.5 mg Oral BID     pantoprazole  40 mg Oral BID     PARoxetine  50 mg Oral Daily     piperacillin-tazobactam  3.375 g Intravenous Q6H     sodium chloride (PF)  3 mL Intracatheter Q8H     umeclidinium-vilanterol  1 puff Inhalation Daily       Data   Recent Labs   Lab 04/05/23  1146 04/05/23  0722 04/05/23  0614 04/04/23  2221 04/04/23  2215 04/04/23  1251 04/04/23  0537 04/03/23  2223 04/03/23  1412 04/03/23  1120 04/03/23  1058   WBC  --   --   --  16.6*  --   --  16.8*  --   --   --  15.2*   HGB  --   --   --  12.5*  --   --  12.9*  --   --   --  13.7   MCV  --   --   --  101*  --   --  99  --   --   --  99   PLT  --   --   --  183  --   --  165  --   --   --  175   NA  --   --   --   --   --   --  141  --   --   --  136   POTASSIUM  --   --  4.2  --   --   --  4.2  --   --   --  4.5   CHLORIDE  --   --   --   --   --   --  101  --   --   --  96*   CO2  --   --   --   --   --   --  29  --   --   --  29   BUN  --   --   --   --   --   --  23.0  --   --   --  18.5   CR  --   --   --   --   --   --  0.95  --  0.90 1.0 0.99   ANIONGAP  --   --   --   --   --   --  11  --   --   --  11   RAJ  --   --   --   --   --   --  9.7  --   --   --  9.2   * 112*  --   --  143*   < > 158*   < >  --   --  186*   ALBUMIN  --   --   --   --   --   --   --   --   --   --  3.2*   PROTTOTAL  --   --   --   --   --   --   --   --   --   --  7.3   BILITOTAL  --   --   --   --   --   --   --   --   --   --  0.8   ALKPHOS  --   --   --   --   --   --   --   --   --   --  76   ALT  --   --   --   --   --   --   --   --   --   --  17   AST  --   --   --   --   --   --   --   --   --   --  21    < > = values in this interval not displayed.       Imaging:   Recent Results (from the past 24 hour(s))   XR Chest Port 1 View    Narrative    EXAM: XR CHEST PORT 1 VIEW  LOCATION: [facility]  DATE/TIME: 4/4/2023 7:55 PM    INDICATION: increased o2 needs  COMPARISON: CTA chest and chest x-ray 04/03/2023 and multiple prior studies.      Impression    IMPRESSION: Large body habitus. Shallower inspiration. Emphysematous changes are better appreciated on the CT.     Increased streaky right infrahilar opacities corresponding to previously noted peribronchial thickening, retained secretions and associated bronchopneumonia. Similar changes in the left base noted on the CT are obscured by the heart. No pneumothorax.

## 2023-04-05 NOTE — CONSULTS
Care Management Initial Consult    General Information  Assessment completed with: Care Team Member,    Type of CM/SW Visit: Initial Assessment    Primary Care Provider verified and updated as needed:     Readmission within the last 30 days:        Reason for Consult: discharge planning  Advance Care Planning:            Communication Assessment  Patient's communication style: spoken language (English or Bilingual)    Hearing Difficulty or Deaf: yes        Cognitive  Cognitive/Neuro/Behavioral: .WDL except, speech  Level of Consciousness: alert, confused (at times)  Arousal Level: opens eyes spontaneously  Orientation: oriented x 4  Mood/Behavior: cooperative, calm  Best Language: 0 - No aphasia  Speech: garbled (at times)    Living Environment:   People in home: facility resident     Current living Arrangements: assisted living  Name of Facility: [facility]   Able to return to prior arrangements: yes       Family/Social Support:  Care provided by:    Provides care for: no one, unable/limited ability to care for self  Marital Status:   Children, Wife          Description of Support System: Involved    Support Assessment: Adequate family and caregiver support    Current Resources:   Patient receiving home care services: Yes  Skilled Home Care Services: Skilled Nursing  Community Resources: None  Equipment currently used at home:    Supplies currently used at home:      Employment/Financial:  Employment Status: retired        Financial Concerns: No concerns identified   Referral to Financial Worker: No       Lifestyle & Psychosocial Needs:  Social Determinants of Health     Tobacco Use: Medium Risk (9/26/2022)    Patient History      Smoking Tobacco Use: Former      Smokeless Tobacco Use: Never      Passive Exposure: Not on file   Alcohol Use: Not on file   Financial Resource Strain: Not on file   Food Insecurity: Not on file   Transportation Needs: Not on file   Physical Activity: Not on file   Stress:  "Not on file   Social Connections: Not on file   Intimate Partner Violence: Not on file   Depression: Not on file   Housing Stability: Not on file       Functional Status:  Prior to admission patient needed assistance:              Mental Health Status:  Mental Health Status: No Current Concerns       Chemical Dependency Status:  Chemical Dependency Status: No Current Concerns             Values/Beliefs:  Spiritual, Cultural Beliefs, Sabianist Practices, Values that affect care: no               Additional Information:  SW reviewed chart. Pt admitted from [facility]. [facility]. Spoke with the RN, Shireen, [phone]. Pt uses a [facility] list for transfers and is \"total care.\" He has weekly bathing, meals, laundry, medication administration, hourly safety checks, a chirinos catheter and a call light. PT/OT signed off as it appeared pt was at his baseline. Shireen agreed and pt can return when ready. SW also updated his Fady CM, Sri, [phone]. Pt uses [facility]. SW following for discharge planning.     RAMEN Diane, LGSW  [phone] Desk phone  [phone] Cell/text (Preferred)  [facility]          "

## 2023-04-05 NOTE — PLAN OF CARE
A&O x 2-3, orientation fluctuates, hallucinations overnight. Received pt post RRT. Lactic improved after 2L LR bolus. Fever resolved w/Tylenol. Slightly tachycardic, otherwise VSS on HFNC FiO2 from 80% down to 45%. Tele: ST w/occasional PVCs. Lift w/assist of 2, turn/repo q2h. NPO except ice chips and meds. Chronic chirinos in place for retention w/adequate UO. No BM this shift, +BS. Wound care done on sacral wound. Rt ear wound open, no drainage. PIV rt SL. LS coarse and diminished in lower lobes. Blood culture and urine culture pending result. K protocol. Continue plan of care.      Goal Outcome Evaluation:      Plan of Care Reviewed With: patient    Overall Patient Progress: improving    Outcome Evaluation: Titrating down from HFNC. Fever resolved. LS less coarse and ABG lactic improving.

## 2023-04-06 ENCOUNTER — APPOINTMENT (OUTPATIENT)
Dept: SPEECH THERAPY | Facility: CLINIC | Age: 83
DRG: 871 | End: 2023-04-06
Payer: MEDICARE

## 2023-04-06 LAB
ANION GAP SERPL CALCULATED.3IONS-SCNC: 8 MMOL/L (ref 7–15)
BUN SERPL-MCNC: 22.9 MG/DL (ref 8–23)
CALCIUM SERPL-MCNC: 8.7 MG/DL (ref 8.8–10.2)
CHLORIDE SERPL-SCNC: 105 MMOL/L (ref 98–107)
CREAT SERPL-MCNC: 0.89 MG/DL (ref 0.67–1.17)
DEPRECATED HCO3 PLAS-SCNC: 29 MMOL/L (ref 22–29)
ERYTHROCYTE [DISTWIDTH] IN BLOOD BY AUTOMATED COUNT: 16.3 % (ref 10–15)
GFR SERPL CREATININE-BSD FRML MDRD: 87 ML/MIN/1.73M2
GLUCOSE BLDC GLUCOMTR-MCNC: 111 MG/DL (ref 70–99)
GLUCOSE BLDC GLUCOMTR-MCNC: 112 MG/DL (ref 70–99)
GLUCOSE BLDC GLUCOMTR-MCNC: 116 MG/DL (ref 70–99)
GLUCOSE BLDC GLUCOMTR-MCNC: 128 MG/DL (ref 70–99)
GLUCOSE SERPL-MCNC: 128 MG/DL (ref 70–99)
HCT VFR BLD AUTO: 35.7 % (ref 40–53)
HGB BLD-MCNC: 11.1 G/DL (ref 13.3–17.7)
MCH RBC QN AUTO: 31.4 PG (ref 26.5–33)
MCHC RBC AUTO-ENTMCNC: 31.1 G/DL (ref 31.5–36.5)
MCV RBC AUTO: 101 FL (ref 78–100)
PLATELET # BLD AUTO: 154 10E3/UL (ref 150–450)
POTASSIUM SERPL-SCNC: 3.7 MMOL/L (ref 3.4–5.3)
RBC # BLD AUTO: 3.54 10E6/UL (ref 4.4–5.9)
SODIUM SERPL-SCNC: 142 MMOL/L (ref 136–145)
WBC # BLD AUTO: 9.1 10E3/UL (ref 4–11)

## 2023-04-06 PROCEDURE — 94640 AIRWAY INHALATION TREATMENT: CPT | Mod: 76

## 2023-04-06 PROCEDURE — 94640 AIRWAY INHALATION TREATMENT: CPT

## 2023-04-06 PROCEDURE — 250N000013 HC RX MED GY IP 250 OP 250 PS 637: Performed by: INTERNAL MEDICINE

## 2023-04-06 PROCEDURE — 36415 COLL VENOUS BLD VENIPUNCTURE: CPT | Performed by: INTERNAL MEDICINE

## 2023-04-06 PROCEDURE — 250N000011 HC RX IP 250 OP 636: Performed by: INTERNAL MEDICINE

## 2023-04-06 PROCEDURE — 85027 COMPLETE CBC AUTOMATED: CPT | Performed by: INTERNAL MEDICINE

## 2023-04-06 PROCEDURE — 120N000013 HC R&B IMCU

## 2023-04-06 PROCEDURE — 999N000157 HC STATISTIC RCP TIME EA 10 MIN

## 2023-04-06 PROCEDURE — 99232 SBSQ HOSP IP/OBS MODERATE 35: CPT | Performed by: INTERNAL MEDICINE

## 2023-04-06 PROCEDURE — 80048 BASIC METABOLIC PNL TOTAL CA: CPT | Performed by: INTERNAL MEDICINE

## 2023-04-06 PROCEDURE — 92526 ORAL FUNCTION THERAPY: CPT | Mod: GN | Performed by: SPEECH-LANGUAGE PATHOLOGIST

## 2023-04-06 PROCEDURE — 250N000009 HC RX 250: Performed by: INTERNAL MEDICINE

## 2023-04-06 RX ADMIN — PIPERACILLIN AND TAZOBACTAM 3.38 G: 3; .375 INJECTION, POWDER, FOR SOLUTION INTRAVENOUS at 18:26

## 2023-04-06 RX ADMIN — METOPROLOL TARTRATE 12.5 MG: 25 TABLET, FILM COATED ORAL at 08:21

## 2023-04-06 RX ADMIN — PAROXETINE HYDROCHLORIDE 50 MG: 10 TABLET, FILM COATED ORAL at 08:21

## 2023-04-06 RX ADMIN — IPRATROPIUM BROMIDE 0.5 MG: 0.5 SOLUTION RESPIRATORY (INHALATION) at 12:55

## 2023-04-06 RX ADMIN — ALLOPURINOL 300 MG: 300 TABLET ORAL at 08:21

## 2023-04-06 RX ADMIN — ATORVASTATIN CALCIUM 10 MG: 10 TABLET, FILM COATED ORAL at 08:21

## 2023-04-06 RX ADMIN — LEVALBUTEROL HYDROCHLORIDE 1.25 MG: 1.25 SOLUTION RESPIRATORY (INHALATION) at 19:29

## 2023-04-06 RX ADMIN — PIPERACILLIN AND TAZOBACTAM 3.38 G: 3; .375 INJECTION, POWDER, FOR SOLUTION INTRAVENOUS at 06:39

## 2023-04-06 RX ADMIN — PIPERACILLIN AND TAZOBACTAM 3.38 G: 3; .375 INJECTION, POWDER, FOR SOLUTION INTRAVENOUS at 12:10

## 2023-04-06 RX ADMIN — PIPERACILLIN AND TAZOBACTAM 3.38 G: 3; .375 INJECTION, POWDER, FOR SOLUTION INTRAVENOUS at 00:23

## 2023-04-06 RX ADMIN — AZITHROMYCIN MONOHYDRATE 250 MG: 250 TABLET ORAL at 08:21

## 2023-04-06 RX ADMIN — UMECLIDINIUM BROMIDE AND VILANTEROL TRIFENATATE 1 PUFF: 62.5; 25 POWDER RESPIRATORY (INHALATION) at 08:22

## 2023-04-06 RX ADMIN — PIPERACILLIN AND TAZOBACTAM 3.38 G: 3; .375 INJECTION, POWDER, FOR SOLUTION INTRAVENOUS at 23:15

## 2023-04-06 RX ADMIN — ENOXAPARIN SODIUM 40 MG: 40 INJECTION SUBCUTANEOUS at 18:55

## 2023-04-06 RX ADMIN — IPRATROPIUM BROMIDE 0.5 MG: 0.5 SOLUTION RESPIRATORY (INHALATION) at 07:36

## 2023-04-06 RX ADMIN — METOPROLOL TARTRATE 12.5 MG: 25 TABLET, FILM COATED ORAL at 20:42

## 2023-04-06 RX ADMIN — Medication: at 08:22

## 2023-04-06 RX ADMIN — LEVALBUTEROL HYDROCHLORIDE 1.25 MG: 1.25 SOLUTION RESPIRATORY (INHALATION) at 12:55

## 2023-04-06 RX ADMIN — PANTOPRAZOLE SODIUM 40 MG: 40 TABLET, DELAYED RELEASE ORAL at 08:21

## 2023-04-06 RX ADMIN — PANTOPRAZOLE SODIUM 40 MG: 40 TABLET, DELAYED RELEASE ORAL at 20:43

## 2023-04-06 RX ADMIN — LEVALBUTEROL HYDROCHLORIDE 1.25 MG: 1.25 SOLUTION RESPIRATORY (INHALATION) at 07:35

## 2023-04-06 RX ADMIN — SENNOSIDES AND DOCUSATE SODIUM 1 TABLET: 50; 8.6 TABLET ORAL at 16:37

## 2023-04-06 RX ADMIN — ASPIRIN 81 MG: 81 TABLET, COATED ORAL at 08:21

## 2023-04-06 RX ADMIN — IPRATROPIUM BROMIDE 0.5 MG: 0.5 SOLUTION RESPIRATORY (INHALATION) at 19:27

## 2023-04-06 ASSESSMENT — ACTIVITIES OF DAILY LIVING (ADL)
ADLS_ACUITY_SCORE: 41

## 2023-04-06 NOTE — PLAN OF CARE
Goal Outcome Evaluation:  Orientations: A&Ox2-3, increasing hallucinations this evening. Seroquel added for bedtime.   Vitals/Pain: VSS on HFNC 45% 45L. Denies pain.   Tele: ST with PVCs  Lines/Drains: PIV SL. Chronic chirinos  Skin/Wounds: Scattered bruising. Coccyx wound, T/R q.2H. Left ear wound.   GI/: Reg diet, mildly thick liquids. Adequate UOP. No BM this shift.   Labs: Abnormal/Trends, Electrolyte Replacement- none  Ambulation/Assist: Ax2  Sleep Quality: good  Plan: Continue abx, wean o2 as able. Tylenol given for temp of 100.2 at end of shift. Daughter at bedside and updated today. Speech consult completed this morning. Discharge back to [facility] when able.

## 2023-04-06 NOTE — PROGRESS NOTES
Care Management Follow Up    Length of Stay (days): 3    Expected Discharge Date: 04/10/2023     Concerns to be Addressed: discharge planning     Patient plan of care discussed at interdisciplinary rounds: Yes    Anticipated Discharge Disposition: Assisted Living     Anticipated Discharge Services:    Anticipated Discharge DME:      Patient/family educated on Medicare website which has current facility and service quality ratings: no  Education Provided on the Discharge Plan:    Patient/Family in Agreement with the Plan: yes    Referrals Placed by CM/SW: Post Acute Facilities  Private pay costs discussed: Not applicable    Additional Information:  Spoke with SOFIA Iyer at [facility]. Updated her, per her request. Pt can return on the weekend, if ready. Call the main number at [phone]. New meds would need to be filled at the hospital and sent with.       ARMEN Diane, SW  [phone] Desk phone  [phone] Cell/text (Preferred)  [facility]

## 2023-04-06 NOTE — PROGRESS NOTES
[facility]    HOSPITALIST PROGRESS NOTE :   --------------------------------------------------    Date of Admission:  4/3/2023    Cumulative Summary: Ron Gilmore is a 82 year old male with PMH of COPD on chronic oxygen, hx CVA, chronic indwelling chirinos catheter, DM2, HTN, HLD, BPH, depression, neuropathy, spinal stenosis, hx GIB, wheechair bound, who is being admitted for acute hypoxic respiratory failure on 4/3/2023.     Assessment & Plan      Acute on chronic hypoxic respiratory failure; most likely secondary to pneumonia  Patient is a resident of long-term care facility, chronically on 1.5 L of oxygen, was noted to be saturating 90% on 15 L initially in ED and was switched over to BiPAP.  Chest x-ray was unremarkable  CT PE was negative for pulmonary embolism but did show chronic COPD changes and some left basilar infiltrates more than the right side concerning for bilateral pneumonia  Patient also had leukocytosis with WBC count of 15 K  BNP was not elevated  Concern for healthcare acquired pneumonia: as resident of [facility], bilateral infiltrates, unknown organism  Severe sepsis  History of COPD on chronic oxygen 1.5 L    -- Patient is admitted to [facility] for further evaluation and management  -- Patient was started on IV Zosyn and IV azithromycin  -- Patient was given initially 1 L of IV fluid in the ED, PTA Lasix was held  -- PTA DuoNebs 3 times daily ordered  -- Patient also received methylprednisolone 125 mg IV x1 in ED, not continued on admission due to no acute bronchospasm  -- On 04/04/2023, patient initially was able to be weaned off from BiPAP, was on nasal cannula  -- Speech therapy evaluation was requested, has been known to them for chronic mild dysphagia, which he has since CVA in 2018.  Recommended regular solids with mildly thick liquids with use of aspiration precautions.  Patient remains at increased risk for aspiration  -- Recommending  consideration of VFSS if there is concern for silent aspiration.  -- Patient repeat lactic acid came back elevated around 3.0, patient was given normal saline bolus 500 ml  -- Patient later developed worsening of respiratory status, started to require 8 L of oxygen with increased congestion and crackles on examination, RRT was activated  -- Repeat chest x-ray showed increased streaky right infrahilar opacities corresponding to previously noted peribronchial thickening, retained secretions and associated bronchopneumonia.  -- Appreciate RT evaluation, patient was placed on HFNC overnight has been weaned down to 45 L , RT will evaluate if can be switched to NC later  --Telemetry was reviewed showing sinus tachycardia with occasional PVCs  -- Lactic acidosis is resolved  -- Continue current antibiotics  -- VBG reviewed from this morning, showing pH of 7.36, PCO2 of 63, PO2 of 20 and bicarb of 35  -- On examination, patient does not have any significant bronchospasm, has been a started on general diet with thickened liquid this morning by speech therapy, will not order any further IV fluids and IV Solu-Medrol at this point.  -- Started patient on Seroquel 25 mg at bedtime for few nights to help with acute illness delirium as patient was having hallucinations last night, will monitor for signs of somnolence closely.  -- Patient has been more somnolent after start of Seroquel , will discontinue    Elevated troponin: Secondary to supply demand ischemia from sepsis, no signs of ACSInitial troponin 26. EKG NSR.    -- Serial trops flat  --Continue PTA aspirin, statin, metoprolol     DM2  Neuropathy   on admission. A1c 6.1% in 5/2022  -- Sliding scale insulin  -- Check A1c- 6.9 at admission     Hx CVA  Dysphagia  Reportedly has some chronic LUE weakness. He notes he is on a thickened liquid diet at his facility.  --As above, patient has been evaluated by speech therapy, Continue current diet with aspiration  precautions     Chronic indwelling chirinos catheter  BPH  Has history of UTIs. UA appears dirty as unexpected. Already covered by antibiotics above  -- Follow urine culture  -- Continue chirinos catheter here  -- will curbside urology as patient PTA orders include irrigation twice a week      Decubitus ulcers: unclear stage, reported per daughter  -- [facility] RN consult     HTN, HLD: Continue PTA statin, aspirin and metoprolol  Hx Gout: Continue PTA allopurinol  Depression: Continue PTA paxil    Clinically Significant Risk Factors              # Hypoalbuminemia: Lowest albumin = 3.2 g/dL at 4/3/2023 10:58 AM, will monitor as appropriate          # DMII: A1C = 6.9 % (Ref range: <5.7 %) within past 6 months, PRESENT ON ADMISSION          Diet: Snacks/Supplements Adult: Glucerna; With Meals  Room Service  Regular Diet Adult Mildly Thick (level 2)    Chirinos Catheter: PRESENT, indication: Retention  DVT Prophylaxis: Enoxaparin (Lovenox) SQ  Code Status: No CPR- Pre-arrest intubation OK    The patient's care was discussed with the Bedside Nurse and Patient.    Medical Decision Making   35 min were spent in total patient care today including the floor time , more than 50% of the time was spent in patient care coordination and counseling.    Disposition Plan  Patient will most likely return to his long-term care facility, expecting patient to be in the hospital for the next couple of nights.tentative discharge on Saturday depending upon the clinical improvement       Expected Discharge Date: 04/07/2023,  3:00 PM    Destination: assisted living  Discharge Comments: betsy     Entered: Sue Estevez MD 04/06/2023, 8:16 AM       Sue Estevez MD, MD, FACP  Text Page (7am - 6pm)    ----------------------------------------------------------------------------------------------------------------------      Interval History      Patient was seen and examined, seems more tired and somnolent as compared to yesterday although did open his eyes and  answers appropriately, slightly slow in responding.  Patient has been a started on Seroquel last night due to the hallucinations the night before, we discussed about his stopping Seroquel.  Patient thinks that his breathing is improving, already has been following we will try to wean his oxygen further down later in the day.  Patient is otherwise denying any fever or chills    -Data reviewed today: I reviewed all new labs and imaging results over the last 24 hours.    I personally reviewed the chest x-ray image(s) showing Worsening infiltrates and consolidation bilateral.    Physical Exam   Temp: 97.9  F (36.6  C) Temp src: Axillary BP: (!) 146/98 Pulse: 80   Resp: 16 SpO2: 100 % O2 Device: High Flow Nasal Cannula (HFNC) Oxygen Delivery: 45 LPM  Vitals:    04/04/23 0600   Weight: 108.8 kg (239 lb 13.8 oz)     Vital Signs with Ranges  Temp:  [97.9  F (36.6  C)-100.2  F (37.9  C)] 97.9  F (36.6  C)  Pulse:  [] 80  Resp:  [14-26] 16  BP: ()/(58-98) 146/98  FiO2 (%):  [45 %-50 %] 50 %  SpO2:  [92 %-100 %] 100 %  I/O last 3 completed shifts:  In: 1130 [P.O.:1030; I.V.:100]  Out: 1175 [Urine:1175]    GENERAL: Alert , awake and oriented to person and place, slightly confused, looks chronically sick, wearing HFNC NAD. Conversational, appropriate.   HEENT: Normocephalic. EOMI. No icterus or injection. Nares normal.   LUNGS: Clear to auscultation. No dyspnea at rest.  Decreased air entry in bases, no bronchospasm or wheezing  HEART: Regular rate. Extremities perfused.   ABDOMEN: Soft, nontender, and nondistended. Positive bowel sounds.   EXTREMITIES: No LE edema noted.   NEUROLOGIC: Moves extremities x4 on command. No acute focal neurologic abnormalities noted.     Medications     - MEDICATION INSTRUCTIONS -         allopurinol  300 mg Oral QAM     ammonium lactate   Topical Daily     aspirin  81 mg Oral Daily     atorvastatin  10 mg Oral QAM     azithromycin  250 mg Oral Daily     enoxaparin ANTICOAGULANT  40 mg  Subcutaneous Q24H     insulin aspart  1-7 Units Subcutaneous TID AC     insulin aspart  1-5 Units Subcutaneous At Bedtime     ipratropium  0.5 mg Nebulization TID    And     levalbuterol  1.25 mg Nebulization TID     metoprolol tartrate  12.5 mg Oral BID     pantoprazole  40 mg Oral BID     PARoxetine  50 mg Oral Daily     piperacillin-tazobactam  3.375 g Intravenous Q6H     QUEtiapine  25 mg Oral At Bedtime     sodium chloride (PF)  3 mL Intracatheter Q8H     umeclidinium-vilanterol  1 puff Inhalation Daily       Data   Recent Labs   Lab 04/06/23  0514 04/06/23  0154 04/05/23  2224 04/05/23  0722 04/05/23  0614 04/04/23  2221 04/04/23  1251 04/04/23  0537 04/03/23  2223 04/03/23  1412 04/03/23  1120 04/03/23  1058   WBC 9.1  --   --   --   --  16.6*  --  16.8*  --   --   --  15.2*   HGB 11.1*  --   --   --   --  12.5*  --  12.9*  --   --   --  13.7   *  --   --   --   --  101*  --  99  --   --   --  99     --   --   --   --  183  --  165  --   --   --  175     --   --   --   --   --   --  141  --   --   --  136   POTASSIUM 3.7  --   --   --  4.2  --   --  4.2  --   --   --  4.5   CHLORIDE 105  --   --   --   --   --   --  101  --   --   --  96*   CO2 29  --   --   --   --   --   --  29  --   --   --  29   BUN 22.9  --   --   --   --   --   --  23.0  --   --   --  18.5   CR 0.89  --   --   --   --   --   --  0.95  --  0.90   < > 0.99   ANIONGAP 8  --   --   --   --   --   --  11  --   --   --  11   RAJ 8.7*  --   --   --   --   --   --  9.7  --   --   --  9.2   * 128* 138*   < >  --   --    < > 158*   < >  --   --  186*   ALBUMIN  --   --   --   --   --   --   --   --   --   --   --  3.2*   PROTTOTAL  --   --   --   --   --   --   --   --   --   --   --  7.3   BILITOTAL  --   --   --   --   --   --   --   --   --   --   --  0.8   ALKPHOS  --   --   --   --   --   --   --   --   --   --   --  76   ALT  --   --   --   --   --   --   --   --   --   --   --  17   AST  --   --   --   --   --    --   --   --   --   --   --  21    < > = values in this interval not displayed.       Imaging:   No results found for this or any previous visit (from the past 24 hour(s)).

## 2023-04-07 ENCOUNTER — APPOINTMENT (OUTPATIENT)
Dept: SPEECH THERAPY | Facility: CLINIC | Age: 83
DRG: 871 | End: 2023-04-07
Payer: MEDICARE

## 2023-04-07 LAB
ATRIAL RATE - MUSE: 117 BPM
CA-I BLD-MCNC: 4.6 MG/DL (ref 4.4–5.2)
DIASTOLIC BLOOD PRESSURE - MUSE: NORMAL MMHG
GLUCOSE BLDC GLUCOMTR-MCNC: 101 MG/DL (ref 70–99)
GLUCOSE BLDC GLUCOMTR-MCNC: 129 MG/DL (ref 70–99)
GLUCOSE BLDC GLUCOMTR-MCNC: 130 MG/DL (ref 70–99)
GLUCOSE BLDC GLUCOMTR-MCNC: 132 MG/DL (ref 70–99)
GLUCOSE BLDC GLUCOMTR-MCNC: 162 MG/DL (ref 70–99)
INTERPRETATION ECG - MUSE: NORMAL
MAGNESIUM SERPL-MCNC: 2.3 MG/DL (ref 1.7–2.3)
P AXIS - MUSE: 103 DEGREES
POTASSIUM SERPL-SCNC: 3.9 MMOL/L (ref 3.4–5.3)
PR INTERVAL - MUSE: 170 MS
QRS DURATION - MUSE: 126 MS
QT - MUSE: 332 MS
QTC - MUSE: 463 MS
R AXIS - MUSE: -29 DEGREES
SYSTOLIC BLOOD PRESSURE - MUSE: NORMAL MMHG
T AXIS - MUSE: 8 DEGREES
VENTRICULAR RATE- MUSE: 117 BPM

## 2023-04-07 PROCEDURE — 93010 ELECTROCARDIOGRAM REPORT: CPT | Performed by: INTERNAL MEDICINE

## 2023-04-07 PROCEDURE — 250N000011 HC RX IP 250 OP 636: Performed by: INTERNAL MEDICINE

## 2023-04-07 PROCEDURE — 250N000009 HC RX 250: Performed by: INTERNAL MEDICINE

## 2023-04-07 PROCEDURE — 999N000157 HC STATISTIC RCP TIME EA 10 MIN

## 2023-04-07 PROCEDURE — 83735 ASSAY OF MAGNESIUM: CPT | Performed by: INTERNAL MEDICINE

## 2023-04-07 PROCEDURE — 92526 ORAL FUNCTION THERAPY: CPT | Mod: GN

## 2023-04-07 PROCEDURE — 250N000013 HC RX MED GY IP 250 OP 250 PS 637: Performed by: INTERNAL MEDICINE

## 2023-04-07 PROCEDURE — 93005 ELECTROCARDIOGRAM TRACING: CPT

## 2023-04-07 PROCEDURE — 84132 ASSAY OF SERUM POTASSIUM: CPT | Performed by: INTERNAL MEDICINE

## 2023-04-07 PROCEDURE — 82330 ASSAY OF CALCIUM: CPT | Performed by: INTERNAL MEDICINE

## 2023-04-07 PROCEDURE — 120N000001 HC R&B MED SURG/OB

## 2023-04-07 PROCEDURE — 94640 AIRWAY INHALATION TREATMENT: CPT | Mod: 76

## 2023-04-07 PROCEDURE — 94640 AIRWAY INHALATION TREATMENT: CPT

## 2023-04-07 PROCEDURE — 99232 SBSQ HOSP IP/OBS MODERATE 35: CPT | Performed by: INTERNAL MEDICINE

## 2023-04-07 PROCEDURE — 36415 COLL VENOUS BLD VENIPUNCTURE: CPT | Performed by: INTERNAL MEDICINE

## 2023-04-07 RX ADMIN — PIPERACILLIN AND TAZOBACTAM 3.38 G: 3; .375 INJECTION, POWDER, FOR SOLUTION INTRAVENOUS at 05:47

## 2023-04-07 RX ADMIN — METOPROLOL TARTRATE 12.5 MG: 25 TABLET, FILM COATED ORAL at 20:08

## 2023-04-07 RX ADMIN — ATORVASTATIN CALCIUM 10 MG: 10 TABLET, FILM COATED ORAL at 09:09

## 2023-04-07 RX ADMIN — PIPERACILLIN AND TAZOBACTAM 3.38 G: 3; .375 INJECTION, POWDER, FOR SOLUTION INTRAVENOUS at 18:50

## 2023-04-07 RX ADMIN — ENOXAPARIN SODIUM 40 MG: 40 INJECTION SUBCUTANEOUS at 18:50

## 2023-04-07 RX ADMIN — LEVALBUTEROL HYDROCHLORIDE 1.25 MG: 1.25 SOLUTION RESPIRATORY (INHALATION) at 08:50

## 2023-04-07 RX ADMIN — UMECLIDINIUM BROMIDE AND VILANTEROL TRIFENATATE 1 PUFF: 62.5; 25 POWDER RESPIRATORY (INHALATION) at 09:08

## 2023-04-07 RX ADMIN — LEVALBUTEROL HYDROCHLORIDE 1.25 MG: 1.25 SOLUTION RESPIRATORY (INHALATION) at 13:10

## 2023-04-07 RX ADMIN — PANTOPRAZOLE SODIUM 40 MG: 40 TABLET, DELAYED RELEASE ORAL at 20:08

## 2023-04-07 RX ADMIN — IPRATROPIUM BROMIDE 0.5 MG: 0.5 SOLUTION RESPIRATORY (INHALATION) at 19:52

## 2023-04-07 RX ADMIN — ALLOPURINOL 300 MG: 300 TABLET ORAL at 09:09

## 2023-04-07 RX ADMIN — SENNOSIDES AND DOCUSATE SODIUM 1 TABLET: 50; 8.6 TABLET ORAL at 11:20

## 2023-04-07 RX ADMIN — LEVALBUTEROL HYDROCHLORIDE 1.25 MG: 1.25 SOLUTION RESPIRATORY (INHALATION) at 19:52

## 2023-04-07 RX ADMIN — IPRATROPIUM BROMIDE 0.5 MG: 0.5 SOLUTION RESPIRATORY (INHALATION) at 08:50

## 2023-04-07 RX ADMIN — Medication: at 09:08

## 2023-04-07 RX ADMIN — INSULIN ASPART 1 UNITS: 100 INJECTION, SOLUTION INTRAVENOUS; SUBCUTANEOUS at 14:24

## 2023-04-07 RX ADMIN — AZITHROMYCIN MONOHYDRATE 250 MG: 250 TABLET ORAL at 09:09

## 2023-04-07 RX ADMIN — METOPROLOL TARTRATE 12.5 MG: 25 TABLET, FILM COATED ORAL at 09:08

## 2023-04-07 RX ADMIN — PIPERACILLIN AND TAZOBACTAM 3.38 G: 3; .375 INJECTION, POWDER, FOR SOLUTION INTRAVENOUS at 23:37

## 2023-04-07 RX ADMIN — IPRATROPIUM BROMIDE 0.5 MG: 0.5 SOLUTION RESPIRATORY (INHALATION) at 13:10

## 2023-04-07 RX ADMIN — ASPIRIN 81 MG: 81 TABLET, COATED ORAL at 09:09

## 2023-04-07 RX ADMIN — POLYETHYLENE GLYCOL 3350 17 G: 17 POWDER, FOR SOLUTION ORAL at 11:20

## 2023-04-07 RX ADMIN — PIPERACILLIN AND TAZOBACTAM 3.38 G: 3; .375 INJECTION, POWDER, FOR SOLUTION INTRAVENOUS at 11:20

## 2023-04-07 RX ADMIN — PANTOPRAZOLE SODIUM 40 MG: 40 TABLET, DELAYED RELEASE ORAL at 09:09

## 2023-04-07 RX ADMIN — PAROXETINE HYDROCHLORIDE 50 MG: 10 TABLET, FILM COATED ORAL at 09:08

## 2023-04-07 ASSESSMENT — ACTIVITIES OF DAILY LIVING (ADL)
ADLS_ACUITY_SCORE: 41
ADLS_ACUITY_SCORE: 49
ADLS_ACUITY_SCORE: 41
ADLS_ACUITY_SCORE: 49
ADLS_ACUITY_SCORE: 43
ADLS_ACUITY_SCORE: 45
ADLS_ACUITY_SCORE: 49
ADLS_ACUITY_SCORE: 45
ADLS_ACUITY_SCORE: 43
ADLS_ACUITY_SCORE: 41
ADLS_ACUITY_SCORE: 45
ADLS_ACUITY_SCORE: 41

## 2023-04-07 NOTE — PROGRESS NOTES
[facility]    HOSPITALIST PROGRESS NOTE :   --------------------------------------------------    Date of Admission:  4/3/2023    Cumulative Summary: Ron Gilmore is a 82 year old male with PMH of COPD on chronic oxygen, hx CVA, chronic indwelling chirinos catheter, DM2, HTN, HLD, BPH, depression, neuropathy, spinal stenosis, hx GIB, wheechair bound, who is being admitted for acute hypoxic respiratory failure on 4/3/2023.     Assessment & Plan      Acute on chronic hypoxic respiratory failure; most likely secondary to pneumonia  Patient is a resident of long-term care facility, chronically on 1.5 L of oxygen, was noted to be saturating 90% on 15 L initially in ED and was switched over to BiPAP.  Chest x-ray was unremarkable  CT PE was negative for pulmonary embolism but did show chronic COPD changes and some left basilar infiltrates more than the right side concerning for bilateral pneumonia  Patient also had leukocytosis with WBC count of 15 K  BNP was not elevated  Concern for healthcare acquired pneumonia: as resident of [facility], bilateral infiltrates, unknown organism  Severe sepsis  History of COPD on chronic oxygen 1.5 L    -- Patient was admitted for further evaluation and management, initially was on IMC status   -- Patient was started on IV Zosyn and IV azithromycin  -- Patient was given initially 1 L of IV fluid in the ED, PTA Lasix was held  -- PTA DuoNebs 3 times daily ordered  -- Patient also received methylprednisolone 125 mg IV x1 in ED, not continued on admission due to no acute bronchospasm  -- On 04/04/2023, patient initially was able to be weaned off from BiPAP, was on nasal cannula  -- Speech therapy evaluation was requested, has been known to them for chronic mild dysphagia, which he has since CVA in 2018.  Recommended regular solids with mildly thick liquids with use of aspiration precautions.  Patient remains at increased risk for aspiration  --  Recommending consideration of VFSS if there is concern for silent aspiration.  -- Patient repeat lactic acid came back elevated around 3.0, patient was given normal saline bolus 500 ml  -- Patient later developed worsening of respiratory status, started to require 8 L of oxygen with increased congestion and crackles on examination, RRT was activated  -- Repeat chest x-ray showed increased streaky right infrahilar opacities corresponding to previously noted peribronchial thickening, retained secretions and associated bronchopneumonia.  -- Appreciate RT evaluation, patient was placed on HFNC overnight , weaned down to 45 L , now has been on face mask since yesterday evening , on 6 liters of Oxygen  --Telemetry was reviewed showing sinus tachycardia with occasional PVCs  -- Lactic acidosis is resolved  -- Continue current antibiotics  -- On examination, patient does not have any significant bronchospasm, has been a started on general diet with thickened liquid this morning by speech therapy, will not order any further IV fluids and IV Solu-Medrol at this point.  -- S/p RRT, as patient was having significant hallucinations, patient was a started on Seroquel 25 mg p.o. at bedtime  -- Patient was noticed to be more somnolent on 04/6/23, Seroquel was discontinued  -- Patient seems significantly more alert awake and oriented, seems to be at his baseline, thinks that he is feeling better he is saturating 94% on 5 L nasal cannula  -- His baseline oxygen demand is 1.5 L    Elevated troponin: Secondary to supply demand ischemia from sepsis, no signs of ACSInitial troponin 26. EKG NSR.    -- Serial trops flat  --Continue PTA aspirin, statin, metoprolol     DM2  Neuropathy   on admission. A1c 6.1% in 5/2022  -- Sliding scale insulin  -- Check A1c- 6.9 at admission     Hx CVA  Dysphagia  Reportedly has some chronic LUE weakness. He notes he is on a thickened liquid diet at his facility.  --As above, patient has been  evaluated by speech therapy, Continue current diet with aspiration precautions     Chronic indwelling chirinos catheter  BPH  Has history of UTIs. UA appears dirty as unexpected. Already covered by antibiotics above  -- Follow urine culture  -- Continue chirinos catheter here  -- According to patient, he does require Chirinos catheter irrigation twice a week due to significant sediments present in the urine so it does not cause clogging.     Decubitus ulcers: unclear stage, reported per daughter  -- WOC RN consult     HTN, HLD: Continue PTA statin, aspirin and metoprolol  Hx Gout: Continue PTA allopurinol  Depression: Continue PTA paxil    Clinically Significant Risk Factors              # Hypoalbuminemia: Lowest albumin = 3.2 g/dL at 4/3/2023 10:58 AM, will monitor as appropriate          # DMII: A1C = 6.9 % (Ref range: <5.7 %) within past 6 months, PRESENT ON ADMISSION          Diet: Snacks/Supplements Adult: Glucerna; With Meals  Room Service  Combination Diet Pureed Diet (level 4); Mildly Thick (level 2) (By spoon)    Chirinos Catheter: PRESENT, indication: Retention  DVT Prophylaxis: Enoxaparin (Lovenox) SQ  Code Status: No CPR- Pre-arrest intubation OK    The patient's care was discussed with the Bedside Nurse and Patient.    Medical Decision Making   45 min were spent in total patient care today including the floor time , more than 50% of the time was spent in patient care coordination and counseling.  Tried calling patient,s wife Yuli and left voice mail on her personal cell phone number     Disposition Plan  Patient will most likely return to his long-term care facility, expecting patient to be in the hospital for the next couple of nights.tentative discharge on Saturday depending upon the clinical improvement       Expected Discharge Date: 04/10/2023,  3:00 PM    Destination: assisted living  Discharge Comments: [facility]     Entered: Sue Estevez MD 04/07/2023, 8:20 AM       Sue Estevez MD, MD, FACP  Text Page (7am -  6pm)    ----------------------------------------------------------------------------------------------------------------------      Interval History      Patient was seen and examined, looks significantly clinically improved today, feeling much better, alert awake and oriented, has been on facemask since yesterday, we discussed about continuing to hopefully wean him off the oxygen, his baseline oxygen is 1.5, discussed with him that he might still be requiring high level of oxygen when he will be discharged.      -Data reviewed today: I reviewed all new labs and imaging results over the last 24 hours.    I personally reviewed the chest x-ray image(s) showing Worsening infiltrates and consolidation bilateral.    Physical Exam   Temp: 98.9  F (37.2  C) Temp src: Oral BP: 103/59 Pulse: 88   Resp: 18 SpO2: 95 % O2 Device: Oxymask Oxygen Delivery: 6 LPM  Vitals:    04/04/23 0600 04/06/23 0833   Weight: 108.8 kg (239 lb 13.8 oz) 111.6 kg (246 lb 1.6 oz)     Vital Signs with Ranges  Temp:  [98.6  F (37  C)-100.1  F (37.8  C)] 98.9  F (37.2  C)  Pulse:  [81-99] 88  Resp:  [14-20] 18  BP: ()/(54-79) 103/59  FiO2 (%):  [35 %-40 %] 35 %  SpO2:  [92 %-98 %] 95 %  I/O last 3 completed shifts:  In: 820 [P.O.:720; I.V.:100]  Out: 900 [Urine:900]    GENERAL: Alert , awake and oriented to person and place, slightly  looks clinically improved , wearing face mask at 6 liters,. Conversational, appropriate.   HEENT: Normocephalic. EOMI. No icterus or injection. Nares normal.   LUNGS: Clear to auscultation. No dyspnea at rest.  Decreased air entry in bases, no bronchospasm or wheezing  HEART: Regular rate. Extremities perfused.   ABDOMEN: Soft, nontender, and nondistended. Positive bowel sounds.   EXTREMITIES: No LE edema noted.   NEUROLOGIC: Moves extremities x4 on command. No acute focal neurologic abnormalities noted.     Medications     - MEDICATION INSTRUCTIONS -         allopurinol  300 mg Oral QAM     ammonium lactate    Topical Daily     aspirin  81 mg Oral Daily     atorvastatin  10 mg Oral QAM     azithromycin  250 mg Oral Daily     enoxaparin ANTICOAGULANT  40 mg Subcutaneous Q24H     insulin aspart  1-7 Units Subcutaneous TID AC     insulin aspart  1-5 Units Subcutaneous At Bedtime     ipratropium  0.5 mg Nebulization TID    And     levalbuterol  1.25 mg Nebulization TID     metoprolol tartrate  12.5 mg Oral BID     pantoprazole  40 mg Oral BID     PARoxetine  50 mg Oral Daily     piperacillin-tazobactam  3.375 g Intravenous Q6H     sodium chloride (PF)  3 mL Intracatheter Q8H     umeclidinium-vilanterol  1 puff Inhalation Daily       Data   Recent Labs   Lab 04/07/23  0527 04/07/23  0236 04/06/23  2128 04/06/23  1843 04/06/23  1104 04/06/23  0514 04/05/23  0722 04/05/23  0614 04/04/23  2221 04/04/23  1251 04/04/23  0537 04/03/23  2223 04/03/23  1412 04/03/23  1120 04/03/23  1058   WBC  --   --   --   --   --  9.1  --   --  16.6*  --  16.8*  --   --   --  15.2*   HGB  --   --   --   --   --  11.1*  --   --  12.5*  --  12.9*  --   --   --  13.7   MCV  --   --   --   --   --  101*  --   --  101*  --  99  --   --   --  99   PLT  --   --   --   --   --  154  --   --  183  --  165  --   --   --  175   NA  --   --   --   --   --  142  --   --   --   --  141  --   --   --  136   POTASSIUM 3.9  --   --   --   --  3.7  --  4.2  --   --  4.2  --   --   --  4.5   CHLORIDE  --   --   --   --   --  105  --   --   --   --  101  --   --   --  96*   CO2  --   --   --   --   --  29  --   --   --   --  29  --   --   --  29   BUN  --   --   --   --   --  22.9  --   --   --   --  23.0  --   --   --  18.5   CR  --   --   --   --   --  0.89  --   --   --   --  0.95  --  0.90   < > 0.99   ANIONGAP  --   --   --   --   --  8  --   --   --   --  11  --   --   --  11   RAJ  --   --   --   --   --  8.7*  --   --   --   --  9.7  --   --   --  9.2   GLC  --  132* 111* 112*   < > 128*   < >  --   --    < > 158*   < >  --   --  186*   ALBUMIN  --   --   --    --   --   --   --   --   --   --   --   --   --   --  3.2*   PROTTOTAL  --   --   --   --   --   --   --   --   --   --   --   --   --   --  7.3   BILITOTAL  --   --   --   --   --   --   --   --   --   --   --   --   --   --  0.8   ALKPHOS  --   --   --   --   --   --   --   --   --   --   --   --   --   --  76   ALT  --   --   --   --   --   --   --   --   --   --   --   --   --   --  17   AST  --   --   --   --   --   --   --   --   --   --   --   --   --   --  21    < > = values in this interval not displayed.       Imaging:   No results found for this or any previous visit (from the past 24 hour(s)).

## 2023-04-07 NOTE — PLAN OF CARE
Goal Outcome Evaluation:  Orientations: A&Ox3 disoriented to situation.   Vitals/Pain: VSS on 1.5L NC (baseline). Denies pain.   Tele: SR/ST w/ BBB   Lines/Drains: 1 PIV. Chronic chirinos. Irrigated per orders.  Skin/Wounds: Wound on coccyx. Wound on L ear. Wound care per orders.   GI/: Mod carb diet. Adequate UOP. No bm. Senna given.   Labs: Abnormal/Trends, Electrolyte Replacement- None  Ambulation/Assist: Ax2 T/R  Sleep Quality: Good  Plan: IV abx. Pt lethargic most of morning, switched to pureed diet by SLP. Was more alert for lunch. Able to stay awake and eat safely. Seroquel discontinued. No hallucinations today. Still intermittently confused on situation. Will discharge back to [facility].

## 2023-04-07 NOTE — PROVIDER NOTIFICATION
Notified provider about indwelling chirinos catheter discussed removal or continued need.    Did provider choose to remove indwelling chirinos catheter? NO    Provider's chirinos indication for keeping indwelling chirinos catheter: Indication for continued use: Retention    Is there an order for indwelling chirinos catheter? YES    *If there is a plan to keep chirinos catheter in place at discharge daily notification with provider is not necessary, but please add a notation in the treatment team sticky note that the patient will be discharging with the catheter.

## 2023-04-07 NOTE — PROGRESS NOTES
Care Management Follow Up    Length of Stay (days): 4    Expected Discharge Date: 04/09/2023     Concerns to be Addressed: discharge planning     Patient plan of care discussed at interdisciplinary rounds: Yes    Anticipated Discharge Disposition: Assisted Living     Anticipated Discharge Services:    Anticipated Discharge DME:      Patient/family educated on Medicare website which has current facility and service quality ratings: no  Education Provided on the Discharge Plan:    Patient/Family in Agreement with the Plan: yes    Referrals Placed by CM/SW: Post Acute Facilities  Private pay costs discussed: Not applicable    Additional Information:  Spoke to SOFIA Iyer at [facility], [phone]. She reports pt is on 2L O2 in their facility. Staff can manage 3L, if pt requires, however, no parameters. If ready this weekend call the main number and fax orders to [phone]. Fill new meds and send with. Also updated Djaa OQUENDO RN, [place], [phone], (weekend, [phone]) fax [phone]. SW following.      ARMEN Diane, LGSW  [phone] Desk phone  [phone] Cell/text (Preferred)  [facility]

## 2023-04-07 NOTE — PLAN OF CARE
Pt disoriented to situation. VSS on 6L oxymask. Tele SR w/ BBB and having runs of bigeminal PVCs- MD aware. Lung sounds diminished. Baseline left sided weakness present. Chronic chirinos w/ adequate output. Bowels active, flatus+, no BM overnight. Turned q2h. Up w/ lift. Denies pain. Tolerating thickened diet.

## 2023-04-08 LAB
BACTERIA BLD CULT: NO GROWTH
BACTERIA BLD CULT: NO GROWTH
GLUCOSE BLDC GLUCOMTR-MCNC: 102 MG/DL (ref 70–99)
GLUCOSE BLDC GLUCOMTR-MCNC: 108 MG/DL (ref 70–99)
GLUCOSE BLDC GLUCOMTR-MCNC: 131 MG/DL (ref 70–99)
GLUCOSE BLDC GLUCOMTR-MCNC: 138 MG/DL (ref 70–99)
GLUCOSE BLDC GLUCOMTR-MCNC: 151 MG/DL (ref 70–99)
POTASSIUM SERPL-SCNC: 3.9 MMOL/L (ref 3.4–5.3)

## 2023-04-08 PROCEDURE — 84132 ASSAY OF SERUM POTASSIUM: CPT | Performed by: INTERNAL MEDICINE

## 2023-04-08 PROCEDURE — 94640 AIRWAY INHALATION TREATMENT: CPT | Mod: 76

## 2023-04-08 PROCEDURE — 94640 AIRWAY INHALATION TREATMENT: CPT

## 2023-04-08 PROCEDURE — 250N000013 HC RX MED GY IP 250 OP 250 PS 637: Performed by: INTERNAL MEDICINE

## 2023-04-08 PROCEDURE — 120N000001 HC R&B MED SURG/OB

## 2023-04-08 PROCEDURE — 999N000157 HC STATISTIC RCP TIME EA 10 MIN

## 2023-04-08 PROCEDURE — 36415 COLL VENOUS BLD VENIPUNCTURE: CPT | Performed by: INTERNAL MEDICINE

## 2023-04-08 PROCEDURE — 250N000011 HC RX IP 250 OP 636: Performed by: INTERNAL MEDICINE

## 2023-04-08 PROCEDURE — 99232 SBSQ HOSP IP/OBS MODERATE 35: CPT | Performed by: INTERNAL MEDICINE

## 2023-04-08 PROCEDURE — 250N000009 HC RX 250: Performed by: INTERNAL MEDICINE

## 2023-04-08 RX ADMIN — METOPROLOL TARTRATE 12.5 MG: 25 TABLET, FILM COATED ORAL at 08:46

## 2023-04-08 RX ADMIN — Medication: at 08:50

## 2023-04-08 RX ADMIN — PIPERACILLIN AND TAZOBACTAM 3.38 G: 3; .375 INJECTION, POWDER, FOR SOLUTION INTRAVENOUS at 18:56

## 2023-04-08 RX ADMIN — PIPERACILLIN AND TAZOBACTAM 3.38 G: 3; .375 INJECTION, POWDER, FOR SOLUTION INTRAVENOUS at 06:43

## 2023-04-08 RX ADMIN — ENOXAPARIN SODIUM 40 MG: 40 INJECTION SUBCUTANEOUS at 18:56

## 2023-04-08 RX ADMIN — LEVALBUTEROL HYDROCHLORIDE 1.25 MG: 1.25 SOLUTION RESPIRATORY (INHALATION) at 07:45

## 2023-04-08 RX ADMIN — PIPERACILLIN AND TAZOBACTAM 3.38 G: 3; .375 INJECTION, POWDER, FOR SOLUTION INTRAVENOUS at 13:30

## 2023-04-08 RX ADMIN — IPRATROPIUM BROMIDE 0.5 MG: 0.5 SOLUTION RESPIRATORY (INHALATION) at 19:18

## 2023-04-08 RX ADMIN — IPRATROPIUM BROMIDE 0.5 MG: 0.5 SOLUTION RESPIRATORY (INHALATION) at 13:52

## 2023-04-08 RX ADMIN — PAROXETINE HYDROCHLORIDE 50 MG: 10 TABLET, FILM COATED ORAL at 08:46

## 2023-04-08 RX ADMIN — UMECLIDINIUM BROMIDE AND VILANTEROL TRIFENATATE 1 PUFF: 62.5; 25 POWDER RESPIRATORY (INHALATION) at 08:50

## 2023-04-08 RX ADMIN — ALLOPURINOL 300 MG: 300 TABLET ORAL at 08:47

## 2023-04-08 RX ADMIN — METOPROLOL TARTRATE 12.5 MG: 25 TABLET, FILM COATED ORAL at 22:17

## 2023-04-08 RX ADMIN — LEVALBUTEROL HYDROCHLORIDE 1.25 MG: 1.25 SOLUTION RESPIRATORY (INHALATION) at 19:18

## 2023-04-08 RX ADMIN — ASPIRIN 81 MG: 81 TABLET, COATED ORAL at 08:47

## 2023-04-08 RX ADMIN — ATORVASTATIN CALCIUM 10 MG: 10 TABLET, FILM COATED ORAL at 08:47

## 2023-04-08 RX ADMIN — PANTOPRAZOLE SODIUM 40 MG: 40 TABLET, DELAYED RELEASE ORAL at 22:17

## 2023-04-08 RX ADMIN — IPRATROPIUM BROMIDE 0.5 MG: 0.5 SOLUTION RESPIRATORY (INHALATION) at 07:45

## 2023-04-08 RX ADMIN — PANTOPRAZOLE SODIUM 40 MG: 40 TABLET, DELAYED RELEASE ORAL at 08:47

## 2023-04-08 RX ADMIN — LEVALBUTEROL HYDROCHLORIDE 1.25 MG: 1.25 SOLUTION RESPIRATORY (INHALATION) at 13:51

## 2023-04-08 ASSESSMENT — ACTIVITIES OF DAILY LIVING (ADL)
ADLS_ACUITY_SCORE: 49
ADLS_ACUITY_SCORE: 51
ADLS_ACUITY_SCORE: 45
ADLS_ACUITY_SCORE: 49
ADLS_ACUITY_SCORE: 51
ADLS_ACUITY_SCORE: 49
ADLS_ACUITY_SCORE: 51
ADLS_ACUITY_SCORE: 45
ADLS_ACUITY_SCORE: 49

## 2023-04-08 NOTE — PROGRESS NOTES
A&Ox3-4, forgetful. VSS on 2L NC. Denies pain. SR w/BBB off tele now. PIV SL. Wound on saccrum/coccyx, cleaned, mepilex changed. Wounds on bilateral ears cleaned per WOC, L side has bleeding/scabbing. Baseline L hemiparesis, lift, turn and repo. Chronic chirinos with adequate output. BM x2 today. BG checks, no insulin needed. Poor appetite, 1:1 feed - purreed diet with mildly thick liquids with spoons. Takes pills whole in applesauce. Possible discharge to LTC facility tomorrow.

## 2023-04-08 NOTE — PLAN OF CARE
Pt alert, oriented to self, date. VSS on 4LNC. Denies nausea, pain. ROSS. Tele SR w/ freq PVCs. Mepilex on sacrum. Wounds on ears cleansed per WOC RN protocol. Baseline L sided weakness. AUOP w/ chronic chirinos. Small BM today. Pureed diet w/ mildly thick liquids, 1:1 feeding assistance, poor appetite. Encourage oral intake. Assist x 2 w/ lift. Q2H turn and repos. Continue plan of care.

## 2023-04-08 NOTE — PROGRESS NOTES
[facility]    HOSPITALIST PROGRESS NOTE :   --------------------------------------------------    Date of Admission:  4/3/2023    Cumulative Summary: Ron Gilmore is a 82 year old male with PMH of COPD on chronic oxygen, hx CVA, chronic indwelling chirinos catheter, DM2, HTN, HLD, BPH, depression, neuropathy, spinal stenosis, hx GIB, wheechair bound, who is being admitted for acute hypoxic respiratory failure on 4/3/2023.     Assessment & Plan      Acute on chronic hypoxic respiratory failure; most likely secondary to pneumonia  Patient is a resident of long-term care facility, chronically on 1.5 L of oxygen, was noted to be saturating 90% on 15 L initially in ED and was switched over to BiPAP.  Chest x-ray was unremarkable  CT PE was negative for pulmonary embolism but did show chronic COPD changes and some left basilar infiltrates more than the right side concerning for bilateral pneumonia  Patient also had leukocytosis with WBC count of 15 K  BNP was not elevated  Concern for healthcare acquired pneumonia: as resident of [facility], bilateral infiltrates, unknown organism  Severe sepsis  History of COPD on chronic oxygen 1.5 L    -- Patient was admitted for further evaluation and management, initially was on IMC status   -- Patient was started on IV Zosyn and IV azithromycin  -- Patient was given initially 1 L of IV fluid in the ED, PTA Lasix was held  -- PTA DuoNebs 3 times daily ordered  -- Patient also received methylprednisolone 125 mg IV x1 in ED, not continued on admission due to no acute bronchospasm  -- On 04/04/2023, patient initially was able to be weaned off from BiPAP  -- Speech therapy evaluation was requested, has been known to them for chronic mild dysphagia, which he has since CVA in 2018.    -- Recommended regular solids with mildly thick liquids with use of aspiration precautions.    -- Recommending consideration of VFSS if there is concern for silent  aspiration.  -- on admission, Patient repeat lactic acid came back elevated around 3.0, patient was given normal saline bolus 500 ml  -- Patient later developed worsening of respiratory status, started to require 8 L of oxygen with increased congestion and crackles on examination, RRT was activated  -- Repeat chest x-ray showed increased streaky right infrahilar opacities corresponding to previously noted peribronchial thickening, retained secretions and associated bronchopneumonia.  -- Appreciate RT evaluation, patient was placed on HFNC overnight , weaned down to 45 L , now has been on face mask since yesterday evening , on 6 liters of Oxygen, down to 3 liters this morning   --Telemetry was reviewed showing sinus tachycardia with occasional PVCs  -- Lactic acidosis is resolved  -- Continue current antibiotics  -- His baseline oxygen demand is 1.5 L, as patient Oxygen demand is decreasing hoping that patient can be discharged back to his LTC with baseline or slightly higher Oxygen levels than his baseline   -- left voice message for wife for update , I was able to get hold of daughter López and updated her regarding tentative discharge tomorrow and regarding Shannon,s current clinical status.    Elevated troponin: Secondary to supply demand ischemia from sepsis, no signs of ACSInitial troponin 26. EKG NSR.    -- Serial trops flat  -- Continue PTA aspirin, statin, metoprolol     DM2  Neuropathy   on admission. A1c 6.1% in 5/2022  -- Sliding scale insulin  -- Check A1c- 6.9 at admission     Hx CVA  Dysphagia  Reportedly has some chronic LUE weakness. He notes he is on a thickened liquid diet at his facility.  --As above, patient has been evaluated by speech therapy, Continue current diet with aspiration precautions     Chronic indwelling chirinos catheter  BPH  Has history of UTIs. UA appears dirty as unexpected. Already covered by antibiotics above  -- Follow urine culture  -- Continue chirinos catheter here  --  According to patient, he does require Cardona catheter irrigation twice a week due to significant sediments present in the urine so it does not cause clogging.     Decubitus ulcers: unclear stage, reported per daughter  -- WOC RN consult     HTN, HLD: Continue PTA statin, aspirin and metoprolol  Hx Gout: Continue PTA allopurinol  Depression: Continue PTA paxil    Clinically Significant Risk Factors              # Hypoalbuminemia: Lowest albumin = 3.2 g/dL at 4/3/2023 10:58 AM, will monitor as appropriate          # DMII: A1C = 6.9 % (Ref range: <5.7 %) within past 6 months           Diet: Snacks/Supplements Adult: Glucerna; With Meals  Room Service  Combination Diet Pureed Diet (level 4); Mildly Thick (level 2) (By spoon)    Cardona Catheter: PRESENT, indication: Retention  DVT Prophylaxis: Enoxaparin (Lovenox) SQ  Code Status: No CPR- Pre-arrest intubation OK    The patient's care was discussed with the Bedside Nurse and Patient.    Medical Decision Making   45 min were spent in total patient care today including the floor time , more than 50% of the time was spent in patient care coordination and counseling.  Tried calling patient,s wife Yuli and left voice mail on her personal cell phone number     Disposition Plan  Plan to discharge patient back to his facility tomorrow 0/9/2023    Expected Discharge Date: 04/09/2023,  3:00 PM    Destination: assisted living  Discharge Comments: 4/7 Wean 02     Entered: Sue Estevez MD 04/08/2023, 8:27 AM       Sue Estevez MD, MD, FACP  Text Page (7am - 6pm)    ----------------------------------------------------------------------------------------------------------------------      Interval History      Patient was seen and examined, looks significantly improved, down to 3 L of oxygen from 6 L, we discussed about tentative discharge tomorrow I am hoping that he can be down to his 1.5 L of oxygen which is his baseline if not then will place discharge instructions for weaning  patient down the oxygen to 1.5 L in the coming days.  Patient will be finishing course of antibiotic after the discharge.  I also told the patient that I will update his daughter López  Patient is otherwise denying any chest pain or palpitations     -Data reviewed today: I reviewed all new labs and imaging results over the last 24 hours.    I personally reviewed the chest x-ray image(s) showing Worsening infiltrates and consolidation bilateral.    Physical Exam   Temp: 97.9  F (36.6  C) Temp src: Oral BP: 118/73 Pulse: 87   Resp: 16 SpO2: 93 % O2 Device: Nasal cannula Oxygen Delivery: 5 LPM  Vitals:    04/04/23 0600 04/06/23 0833   Weight: 108.8 kg (239 lb 13.8 oz) 111.6 kg (246 lb 1.6 oz)     Vital Signs with Ranges  Temp:  [97.9  F (36.6  C)-99  F (37.2  C)] 97.9  F (36.6  C)  Pulse:  [84-96] 87  Resp:  [14-17] 16  BP: (118-136)/(71-78) 118/73  SpO2:  [93 %-96 %] 93 %  I/O last 3 completed shifts:  In: 958 [P.O.:958]  Out: 965 [Urine:965]    GENERAL: Alert , awake and oriented to person and place, slightly  looks clinically improved , wearing NC today, Conversational, appropriate.   HEENT: Normocephalic. EOMI. No icterus or injection. Nares normal.   LUNGS: Clear to auscultation. No dyspnea at rest.  Decreased air entry in bases, no bronchospasm or wheezing  HEART: Regular rate. Extremities perfused.   ABDOMEN: Soft, nontender, and nondistended. Positive bowel sounds.   EXTREMITIES: No LE edema noted.   NEUROLOGIC: Moves extremities x4 on command. No acute focal neurologic abnormalities noted.     Medications     - MEDICATION INSTRUCTIONS -         allopurinol  300 mg Oral QAM     ammonium lactate   Topical Daily     aspirin  81 mg Oral Daily     atorvastatin  10 mg Oral QAM     enoxaparin ANTICOAGULANT  40 mg Subcutaneous Q24H     insulin aspart  1-7 Units Subcutaneous TID AC     insulin aspart  1-5 Units Subcutaneous At Bedtime     ipratropium  0.5 mg Nebulization TID    And     levalbuterol  1.25 mg Nebulization  TID     metoprolol tartrate  12.5 mg Oral BID     pantoprazole  40 mg Oral BID     PARoxetine  50 mg Oral Daily     piperacillin-tazobactam  3.375 g Intravenous Q6H     sodium chloride (PF)  3 mL Intracatheter Q8H     umeclidinium-vilanterol  1 puff Inhalation Daily       Data   Recent Labs   Lab 04/08/23  0720 04/08/23  0648 04/07/23  2310 04/07/23  0827 04/07/23  0527 04/06/23  1104 04/06/23  0514 04/05/23  0722 04/05/23  0614 04/04/23  2221 04/04/23  1251 04/04/23  0537 04/03/23  2223 04/03/23  1412 04/03/23  1120 04/03/23  1058   WBC  --   --   --   --   --   --  9.1  --   --  16.6*  --  16.8*  --   --   --  15.2*   HGB  --   --   --   --   --   --  11.1*  --   --  12.5*  --  12.9*  --   --   --  13.7   MCV  --   --   --   --   --   --  101*  --   --  101*  --  99  --   --   --  99   PLT  --   --   --   --   --   --  154  --   --  183  --  165  --   --   --  175   NA  --   --   --   --   --   --  142  --   --   --   --  141  --   --   --  136   POTASSIUM  --   --   --   --  3.9  --  3.7  --  4.2  --   --  4.2  --   --   --  4.5   CHLORIDE  --   --   --   --   --   --  105  --   --   --   --  101  --   --   --  96*   CO2  --   --   --   --   --   --  29  --   --   --   --  29  --   --   --  29   BUN  --   --   --   --   --   --  22.9  --   --   --   --  23.0  --   --   --  18.5   CR  --   --   --   --   --   --  0.89  --   --   --   --  0.95  --  0.90   < > 0.99   ANIONGAP  --   --   --   --   --   --  8  --   --   --   --  11  --   --   --  11   RAJ  --   --   --   --   --   --  8.7*  --   --   --   --  9.7  --   --   --  9.2   * 151* 130*   < >  --    < > 128*   < >  --   --    < > 158*   < >  --   --  186*   ALBUMIN  --   --   --   --   --   --   --   --   --   --   --   --   --   --   --  3.2*   PROTTOTAL  --   --   --   --   --   --   --   --   --   --   --   --   --   --   --  7.3   BILITOTAL  --   --   --   --   --   --   --   --   --   --   --   --   --   --   --  0.8   ALKPHOS  --   --   --   --    --   --   --   --   --   --   --   --   --   --   --  76   ALT  --   --   --   --   --   --   --   --   --   --   --   --   --   --   --  17   AST  --   --   --   --   --   --   --   --   --   --   --   --   --   --   --  21    < > = values in this interval not displayed.       Imaging:   No results found for this or any previous visit (from the past 24 hour(s)).

## 2023-04-08 NOTE — PROGRESS NOTES
Patient transferred from 33 to 66 today at 1500. A & O x 2-3. VSS on 2 L NC. Denies pain. Belongings remained with patient in room. Contact precautions maintained. Nursing will continue to monitor and assess.     Brianna Grider RN on 4/8/2023 at 3:17 PM

## 2023-04-08 NOTE — PLAN OF CARE
Goal Outcome Evaluation:  Orientations: A/O x 2-3. Disoriented to situation  Vitals/Pain: VSS on 5L NC, denies pain/nausea.  -tolerating pureed diet with mildly thick liquids, with 1:1 feeding assistance. Poor appetite  Tele: SR with BBB, freq PVCs  Lines/Drains: PIV SL, int antibiotics  Skin/Wounds: Wound on sacrum/coccyx with mepi in place. Wound on L ear LUIS FERNANDO. Baseline hemiparesis on left side  GI/: Chronic Cardona with adequate urine output, BM x 2. Fecal impaction  Labs: Abnormal/Trends, Electrolyte Replacement- BG checks ACHS, K/mg recheck in AM  Ambulation/Assist: Assist x 2 with lift, turn/repo Q2  Sleep Quality: Good  Plan: Continue plan of care, wean O2

## 2023-04-09 VITALS
TEMPERATURE: 98.6 F | OXYGEN SATURATION: 92 % | SYSTOLIC BLOOD PRESSURE: 111 MMHG | RESPIRATION RATE: 16 BRPM | WEIGHT: 246.1 LBS | BODY MASS INDEX: 33.38 KG/M2 | HEART RATE: 83 BPM | DIASTOLIC BLOOD PRESSURE: 65 MMHG

## 2023-04-09 LAB
CREAT SERPL-MCNC: 0.82 MG/DL (ref 0.67–1.17)
GFR SERPL CREATININE-BSD FRML MDRD: 89 ML/MIN/1.73M2
GLUCOSE BLDC GLUCOMTR-MCNC: 109 MG/DL (ref 70–99)
GLUCOSE BLDC GLUCOMTR-MCNC: 122 MG/DL (ref 70–99)
GLUCOSE BLDC GLUCOMTR-MCNC: 147 MG/DL (ref 70–99)
PLATELET # BLD AUTO: 191 10E3/UL (ref 150–450)

## 2023-04-09 PROCEDURE — 250N000013 HC RX MED GY IP 250 OP 250 PS 637: Performed by: INTERNAL MEDICINE

## 2023-04-09 PROCEDURE — 999N000157 HC STATISTIC RCP TIME EA 10 MIN

## 2023-04-09 PROCEDURE — 99239 HOSP IP/OBS DSCHRG MGMT >30: CPT | Performed by: INTERNAL MEDICINE

## 2023-04-09 PROCEDURE — 85049 AUTOMATED PLATELET COUNT: CPT | Performed by: INTERNAL MEDICINE

## 2023-04-09 PROCEDURE — 94640 AIRWAY INHALATION TREATMENT: CPT

## 2023-04-09 PROCEDURE — 36415 COLL VENOUS BLD VENIPUNCTURE: CPT | Performed by: INTERNAL MEDICINE

## 2023-04-09 PROCEDURE — 94640 AIRWAY INHALATION TREATMENT: CPT | Mod: 76

## 2023-04-09 PROCEDURE — 250N000011 HC RX IP 250 OP 636: Performed by: INTERNAL MEDICINE

## 2023-04-09 PROCEDURE — 250N000009 HC RX 250: Performed by: INTERNAL MEDICINE

## 2023-04-09 PROCEDURE — 82565 ASSAY OF CREATININE: CPT | Performed by: INTERNAL MEDICINE

## 2023-04-09 RX ORDER — FUROSEMIDE 20 MG
30 TABLET ORAL DAILY
Qty: 45 TABLET | Refills: 0 | Status: ON HOLD | OUTPATIENT
Start: 2023-04-09 | End: 2023-09-24

## 2023-04-09 RX ORDER — FUROSEMIDE 20 MG
30 TABLET ORAL DAILY
DISCHARGE
Start: 2023-04-09 | End: 2023-04-09

## 2023-04-09 RX ADMIN — IPRATROPIUM BROMIDE 0.5 MG: 0.5 SOLUTION RESPIRATORY (INHALATION) at 13:24

## 2023-04-09 RX ADMIN — ATORVASTATIN CALCIUM 10 MG: 10 TABLET, FILM COATED ORAL at 08:54

## 2023-04-09 RX ADMIN — LEVALBUTEROL HYDROCHLORIDE 1.25 MG: 1.25 SOLUTION RESPIRATORY (INHALATION) at 08:58

## 2023-04-09 RX ADMIN — Medication: at 08:56

## 2023-04-09 RX ADMIN — IPRATROPIUM BROMIDE 0.5 MG: 0.5 SOLUTION RESPIRATORY (INHALATION) at 08:58

## 2023-04-09 RX ADMIN — PIPERACILLIN AND TAZOBACTAM 3.38 G: 3; .375 INJECTION, POWDER, FOR SOLUTION INTRAVENOUS at 11:34

## 2023-04-09 RX ADMIN — ALLOPURINOL 300 MG: 300 TABLET ORAL at 08:54

## 2023-04-09 RX ADMIN — PAROXETINE HYDROCHLORIDE 50 MG: 10 TABLET, FILM COATED ORAL at 08:54

## 2023-04-09 RX ADMIN — UMECLIDINIUM BROMIDE AND VILANTEROL TRIFENATATE 1 PUFF: 62.5; 25 POWDER RESPIRATORY (INHALATION) at 08:56

## 2023-04-09 RX ADMIN — ASPIRIN 81 MG: 81 TABLET, COATED ORAL at 08:54

## 2023-04-09 RX ADMIN — PIPERACILLIN AND TAZOBACTAM 3.38 G: 3; .375 INJECTION, POWDER, FOR SOLUTION INTRAVENOUS at 05:45

## 2023-04-09 RX ADMIN — METOPROLOL TARTRATE 12.5 MG: 25 TABLET, FILM COATED ORAL at 08:54

## 2023-04-09 RX ADMIN — LEVALBUTEROL HYDROCHLORIDE 1.25 MG: 1.25 SOLUTION RESPIRATORY (INHALATION) at 13:24

## 2023-04-09 RX ADMIN — PIPERACILLIN AND TAZOBACTAM 3.38 G: 3; .375 INJECTION, POWDER, FOR SOLUTION INTRAVENOUS at 00:12

## 2023-04-09 RX ADMIN — PANTOPRAZOLE SODIUM 40 MG: 40 TABLET, DELAYED RELEASE ORAL at 08:54

## 2023-04-09 ASSESSMENT — ACTIVITIES OF DAILY LIVING (ADL)
ADLS_ACUITY_SCORE: 46
ADLS_ACUITY_SCORE: 51
ADLS_ACUITY_SCORE: 51
ADLS_ACUITY_SCORE: 46
ADLS_ACUITY_SCORE: 51
ADLS_ACUITY_SCORE: 51
ADLS_ACUITY_SCORE: 50

## 2023-04-09 NOTE — DISCHARGE SUMMARY
[facility]  Hospitalist Discharge Summary      Date of Admission:  4/3/2023  Date of Discharge:  4/9/2023  Discharging Provider: Sue Estevez MD, FACP  Discharge Service: Hospitalist Service    Discharge Diagnoses   Acute on chronic hypoxic respiratory failure, improving.  Possible aspiration pneumonia   Healthcare acquired pneumonia cannot be completely ruled out.  Severe sepsis.  History of COPD on chronic oxygen 1.5 L.  Evaded troponin, secondary to sepsis, no signs of any acute coronary syndrome.  Type 2 diabetes mellitus with neuropathy.  History of CVA and chronic dysphagia.  Chronic indwelling Chirinos catheter.  History of BPH.  Decubitus ulcer.  Essential hypertension  Hyperlipidemia.  History of gout.  History of depression.      Follow-ups Needed After Discharge       Unresulted Labs Ordered in the Past 30 Days of this Admission     No orders found from 3/4/2023 to 4/4/2023.          Discharge Disposition   Discharged to assisted living facility  Condition at discharge: Stable    Hospital Course     Cumulative Summary: Ron Gilmore is a 82 year old male with PMH of COPD on chronic oxygen, hx CVA, chronic indwelling chirinos catheter, DM2, HTN, HLD, BPH, depression, neuropathy, spinal stenosis, hx GIB, wheechair bound, who is being admitted for acute hypoxic respiratory failure on 4/3/2023.   Here are further details regarding his current hospitalization     Acute on chronic hypoxic respiratory failure; most likely secondary to pneumonia  Patient is a resident of long-term care facility, chronically on 1.5 L of oxygen, was noted to be saturating 90% on 15 L initially in ED and was switched over to BiPAP.  Chest x-ray was unremarkable  CT PE was negative for pulmonary embolism but did show chronic COPD changes and some left basilar infiltrates more than the right side concerning for bilateral pneumonia  Patient also had leukocytosis with WBC count of 15 K  BNP was not elevated  Concern  for healthcare acquired pneumonia: as resident of long-term care facility, bilateral infiltrates, unknown organism  Severe sepsis  History of COPD on chronic oxygen 1.5 L     -- Patient was admitted for further evaluation and management with [facility] status    -- Patient was started on IV Zosyn and IV azithromycin  -- PTA DuoNebs 3 times daily ordered  -- Patient received methylprednisolone 125 mg IV x1 in ED, not continued on admission due to no acute bronchospasm  -- On 04/04/2023, patient initially was able to be weaned off from BiPAP  -- Speech therapy evaluation was requested, has been known to them for chronic mild dysphagia, which he has since CVA in 2018.    -- Recommended regular solids with mildly thick liquids with use of aspiration precautions.    -- Recommending consideration of VFSS if there is concern for silent aspiration.  -- on admission, Patient repeat lactic acid came back elevated around 3.0, patient was given normal saline bolus 500 ml  -- Patient later developed worsening of respiratory status on the same evening , started to require 8 L of oxygen with increased congestion and crackles on examination, RRT was activated  -- Repeat chest x-ray showed increased streaky right infrahilar opacities corresponding to previously noted peribronchial thickening, retained secretions and associated bronchopneumonia.  -- Appreciate RT evaluation, patient was placed on HFNC  , weaned down to 45 L , then to face mask and now down to 2 liters this morning   -- While in the hospital patient was monitored on telemetry, lactic acidosis is resolved  -- Patient will complete 3 more days of oral Augmentin to complete 10-day course  -- Patient's daughter was updated regarding his clinical status during the hospitalization.  -- Patient now seems to be close to his baseline, only requiring 2 L of oxygen, clinically improving and is planned to be discharged back to his assisted living facility.     Elevated troponin:  Secondary to supply demand ischemia from sepsis, no signs of ACSInitial troponin 26. EKG NSR.     -- Serial trops flat  -- Continue PTA aspirin, statin, metoprolol on discharge   -- Patient has only been using Lasix 30 mg p.o. daily instead of 40 mg, prescription was changed on discharge to 30 mg.     DM2  Neuropathy   on admission. A1c 6.1% in 5/2022  -- Sliding scale insulin  -- Continue PTA management     Hx CVA  Dysphagia  Reportedly has some chronic LUE weakness. He notes he is on a thickened liquid diet at his facility.  --As above, patient has been evaluated by speech therapy, Continue current diet with aspiration precautions     Chronic indwelling chirinos catheter  BPH  Has history of UTIs. UA appears dirty as unexpected. Already covered by antibiotics above  -- Follow urine culture  -- Continued chirinos catheter here  -- According to patient, he does require Chirinos catheter irrigation twice a week due to significant sediments present in the urine so it does not cause clogging.     Decubitus ulcers: unclear stage, reported per daughter  -- C RN consult.  RN was continued on discharge     HTN, HLD: Continue PTA statin, aspirin and metoprolol  Hx Gout: Continue PTA allopurinol  Depression: Continue PTA paxil     Consultations This Hospital Stay   PHYSICAL THERAPY ADULT IP CONSULT  OCCUPATIONAL THERAPY ADULT IP CONSULT  SPEECH LANGUAGE PATH ADULT IP CONSULT  VASCULAR ACCESS ADULT IP CONSULT  WOUND OSTOMY CONTINENCE NURSE  IP CONSULT  CARE MANAGEMENT / SOCIAL WORK IP CONSULT  SPEECH LANGUAGE PATH ADULT IP CONSULT    Code Status   No CPR- Pre-arrest intubation OK    Time Spent on this Encounter   I, Sue Estevez MD, personally saw the patient today and spent greater than 30 minutes discharging this patient.     Sue Estevez MD, FACP  [place] [address] [facility]  [address] [place] [address]  Phone:  [phone]  ______________________________________________________________________    Physical Exam   Vital Signs: Temp: 98.6  F (37  C) Temp src: Oral BP: 111/65 Pulse: 83   Resp: 16 SpO2: 92 % O2 Device: Nasal cannula Oxygen Delivery: 2 LPM  Weight: 246 lbs 1.6 oz    Physical Exam  Vitals and nursing note reviewed.   Constitutional:       Appearance: He is well-developed.   HENT:      Head: Normocephalic and atraumatic.   Eyes:      Pupils: Pupils are equal, round, and reactive to light.   Neck:      Thyroid: No thyromegaly.   Cardiovascular:      Rate and Rhythm: Normal rate and regular rhythm.      Heart sounds: Normal heart sounds.   Pulmonary:      Effort: Pulmonary effort is normal. No respiratory distress.      Breath sounds: Normal breath sounds.   Abdominal:      General: Bowel sounds are normal. There is no distension.      Palpations: Abdomen is soft.   Musculoskeletal:         General: No tenderness. Normal range of motion.      Cervical back: Normal range of motion and neck supple.   Skin:     General: Skin is warm and dry.   Neurological:      Mental Status: He is alert and oriented to person, place, and time. Mental status is at baseline.   Psychiatric:         Behavior: Behavior normal.          Primary Care Physician   Kalen Metcalf    Discharge Orders     Significant Results and Procedures   Results for orders placed or performed during the hospital encounter of 04/03/23   XR Chest Port 1 View    Narrative    CHEST ONE VIEW  4/3/2023 11:21 AM     HISTORY: Respiratory distress    COMPARISON: None.      Impression    IMPRESSION: No acute disease.    BRISSA MENESES MD         SYSTEM ID:  [id number]   CT Chest Pulmonary Embolism w Contrast    Narrative    CT CHEST PULMONARY EMBOLISM WITH CONTRAST April 3, 2023 12:04 PM    CLINICAL HISTORY: Chest Pain. Hypoxia, bed bound.    TECHNIQUE: CT angiogram chest during arterial phase injection IV  contrast. 2D and 3D MIP reconstructions were performed by the  CT  technologist. Dose reduction techniques were used.   CONTRAST: 79mL [id number].    COMPARISON: January 30, 2023.    FINDINGS:  ANGIOGRAM CHEST: Pulmonary arteries are normal caliber and negative  for pulmonary emboli. Thoracic aorta is negative for dissection. No CT  evidence of right heart strain.    LUNGS AND PLEURA: Mild dependent atelectasis and/or infiltrate.  Elevated left hemidiaphragm. No effusions. Benign granulomatous  change.    MEDIASTINUM/AXILLAE: Mildly enlarged paratracheal node measuring 1.6 x  1.1 cm, this is increased in size from previous. Mildly prominent  right hilar node measuring 2.5 x 2.0 cm. No aneurysm.    CORONARY ARTERY CALCIFICATIONS: Severe and/or stents.    UPPER ABDOMEN: No acute findings.    MUSCULOSKELETAL: No frankly destructive bony lesions.      Impression    IMPRESSION:  1.  No pulmonary embolism demonstrated.  2.  Nonspecific mild adenopathy.  3.  Bibasilar atelectasis and/or infiltrate left worse than right.    BRISSA MENESES MD         SYSTEM ID:  [id number]   XR Chest Port 1 View    Narrative    EXAM: XR CHEST PORT 1 VIEW  LOCATION: [facility]  DATE/TIME: 4/4/2023 7:55 PM    INDICATION: increased o2 needs  COMPARISON: CTA chest and chest x-ray 04/03/2023 and multiple prior studies.      Impression    IMPRESSION: Large body habitus. Shallower inspiration. Emphysematous changes are better appreciated on the CT.     Increased streaky right infrahilar opacities corresponding to previously noted peribronchial thickening, retained secretions and associated bronchopneumonia. Similar changes in the left base noted on the CT are obscured by the heart. No pneumothorax.       Discharge Medications   Discharge Medication List as of 4/9/2023 12:29 PM      CONTINUE these medications which have CHANGED    Details   amoxicillin-clavulanate (AUGMENTIN) 875-125 MG tablet Take 1 tablet by mouth 2 times daily for 3 days, Disp-6 tablet, R-0, E-PrescribeFuture refills  by PCP Dr. Kalen Metcalf with phone number [phone].      furosemide (LASIX) 20 MG tablet Take 1.5 tablets (30 mg) by mouth daily, Disp-45 tablet, R-0, E-PrescribeFuture refills by PCP Dr. Kalen Metcalf with phone number [phone].      umeclidinium-vilanterol (ANORO ELLIPTA) 62.5-25 MCG/ACT oral inhaler Inhale 1 puff into the lungs daily, Disp-60 each, R-0, E-PrescribeFuture refills by PCP Dr. Kalen Metcalf with phone number [phone].         CONTINUE these medications which have NOT CHANGED    Details   acetaminophen (TYLENOL) 325 MG tablet Take 650 mg by mouth every 4 hours as needed for mild pain as needed for pain/fever Max dose 3000mg/24hr, Historical      allopurinol (ZYLOPRIM) 300 MG tablet Take 300 mg by mouth every morning, Historical      ammonium lactate (LAC-HYDRIN) 12 % external lotion Apply topically daily Apply thin film to bilateral feet and legsHistorical      aspirin (ASA) 81 MG EC tablet Take 1 tablet (81 mg) by mouth daily, Transitional      atorvastatin (LIPITOR) 10 MG tablet Take 10 mg by mouth every morning, Historical      cyanocobalamin (VITAMIN B-12) 500 MCG tablet Take 500 mcg by mouth every morning, Historical      Emollient (AMLACTIN ULTRA EX) Apply topically as needed TO FEET, Historical      !! hypromellose (ARTIFICIAL TEARS) 0.5 % SOLN ophthalmic solution 1 drop 2 times daily as needed (eye irritation) Into affected eye, Historical      !! hypromellose (ARTIFICIAL TEARS) 0.5 % SOLN ophthalmic solution 2 drops 2 times daily Into the affected eye (eye irritation)  0900, 2000, Historical      !! ipratropium - albuterol 0.5 mg/2.5 mg/3 mL (DUONEB) 0.5-2.5 (3) MG/3ML neb solution Take 1 vial by nebulization daily as needed for shortness of breath, wheezing or cough, Historical      !! ipratropium - albuterol 0.5 mg/2.5 mg/3 mL (DUONEB) 0.5-2.5 (3) MG/3ML neb solution Take 1 vial by nebulization 3 times daily 0900, 1400, 2100, Historical      ipratropium-albuterol  (COMBIVENT RESPIMAT)  MCG/ACT inhaler Inhale 1 puff into the lungs 4 times daily 0900, 1200 ,1600 ,2000, Historical      loperamide (IMODIUM) 2 MG capsule Take 2 mg by mouth every 6 hours as needed for diarrhea, Historical      methenamine hippurate (HIPREX) 1 g tablet Take 1 g by mouth 2 times daily, Historical      metoprolol tartrate (LOPRESSOR) 25 MG tablet Take 0.5 tablets (12.5 mg) by mouth 2 times daily, No Print Out      pantoprazole (PROTONIX) 40 MG EC tablet Take 40 mg by mouth 2 times daily, Historical      !! PARoxetine (PAXIL) 10 MG tablet Take 10 mg by mouth every morning Take with 40mg tablet to equal 50mg total, Historical      !! PARoxetine (PAXIL) 40 MG tablet Take 40 mg by mouth every morning Take with 10mg tablet to equal 50mg total, Historical      polyethylene glycol (MIRALAX) 17 GM/Dose powder Take 17 g by mouth daily, Disp-510 g, OTC      !! senna-docusate (SENOKOT-S/PERICOLACE) 8.6-50 MG tablet Take 1 tablet by mouth daily, Historical      !! senna-docusate (SENOKOT-S/PERICOLACE) 8.6-50 MG tablet Take 1 tablet by mouth daily as needed for constipation, Historical      simethicone (MYLICON) 125 MG chewable tablet Take 125 mg by mouth 3 times daily as needed for intestinal gas, Historical      !! Skin Protectants, Misc. (LANTISEPTIC SKIN PROTECTANT EX) Externally apply topically 2 times daily as needed Apply a thin film to vincent area/buttocks, Historical      !! Skin Protectants, Misc. (LANTISEPTIC SKIN PROTECTANT EX) Externally apply topically 2 times daily Apply a thin film to vincent area/buttocks, Historical       !! - Potential duplicate medications found. Please discuss with provider.        Allergies   No Known Allergies

## 2023-04-09 NOTE — PROGRESS NOTES
Care Management Discharge Note    Discharge Date: 04/09/2023       Discharge Disposition:  (Return to [facility]. ([facility]))    Discharge Services:      Discharge DME:      Discharge Transportation:  ([facility] with portable oxygen)    Private pay costs discussed: Not applicable    PAS Confirmation Code:  Not Needed  Patient/family educated on Medicare website which has current facility and service quality ratings: no    Education Provided on the Discharge Plan:  yes  Persons Notified of Discharge Plans: nurse at [facility] Joi, [facility] nurse Carlyn at [phone] and wife of patient.   Patient/Family in Agreement with the Plan: yes    Handoff Referral Completed: No    Additional Information:  Discharge was arranged with [facility] nurse and [facility] HC.  The nurse, Joi explains the [facility] staff provided the sacral/coccyx wound care. Writer also explained about the new orders for ear skin care which is daily and she said they can provide this service.  Caryln at [facility] explains they provide catheter care. Orders, pertinent chart information  faxed to [facility] at  [phone] and to [facility] at [phone].  Wound care supplies sent with patient.   Writer contacted wife to tell her of the discharge.  Wife lives at [facility] and was aware patient returning.   Discharge medications filled and went with patient.          BAILEE Lopez

## 2023-04-09 NOTE — DISCHARGE SUMMARY
Pt discharged to [facility] with transport. IV discontinued. AVS printed, pt educated. Medications received and put into belongings bag. All belongings went home with pt.

## 2023-04-09 NOTE — PLAN OF CARE
.DATE & TIME: 04/08/23 Evenings    Cognitive Concerns/ Orientation : A&Ox3. Some mild confusion to place and situation at times. Makes needs known.    BEHAVIOR & AGGRESSION TOOL COLOR: Green   CIWA SCORE: NA    ABNL VS/O2: VSS, on 2L O2 baseline.   MOBILITY: L sided weakness from prev stroke. Lift. Assisted with repositioning and turns.   PAIN MANAGMENT: Denied   DIET: Pureed with mild thick fluids  BOWEL/BLADDER: Chronic chirinos, patent. No BM this shift.   ABNL LAB/BG: , 138, K+3.9  DRAIN/DEVICES: Chirinos, PIV SL in RFA   TELEMETRY RHYTHM: NA   SKIN: Wounds on ears and coccyx. Dry skin, some +2 edema in feet.   TESTS/PROCEDURES: Am labs   D/C DATE: Pending improvements. Back to LTC facility.

## 2023-04-09 NOTE — DISCHARGE INSTRUCTIONS
Wound care  DAILY        Comments: BL ears: Daily   1. Cleanse with wound cleanser and blot dry   2. Apply a thin layer of vaseline to wounds on both ears   3. Apply 1/2 Mepilex wrapped around oxygen tubing to offload         Wound care  EVERY THIRD DAY      Comments: BL buttock wound(s): every three days and PRN   1. Clean wound with saline or MicroKlenz Spray, pat dry   2. Wipe entire coccyx/buttock area with Cavilon No Sting Skin Prep #[id number] and allow to dry. This will help protect periwound and help dressing adherence   3. Press a Mepilex  Sacral Dressing (PS#[id number])  to the area, making sure to conform nicely to skin curvatures.   4. Time and date dressing change   NOTE   -Reposition pt side to side sheila when in bed, every 2 hours-get the pt way over on side to completely offload pressure. This will benefit skin and respiratory function   -Keep heels elevated and floating on pillows at all times. Try using at least 2 pillows under each calf.   -When up to the chair pt needs to fully offload every 2 hours and use a chair cushion if needed

## 2023-04-09 NOTE — PLAN OF CARE
DATE & TIME: 04/08/2023 Night      Cognitive Concerns/ Orientation: Orientation waxes/wanes--Alert/oriented x 4 at shift start, then disoriented to time/place; confusion/forgetfulness at times    BEHAVIOR & AGGRESSION TOOL COLOR: Green        ABNL VS/O2: VSS, 2 LPM oxygen via nasal cannula; Baseline oxygen use 1.5-2.0 LPM   MOBILITY: Assist-2, lift; Left-sided weakness from previous stroke with contractures of L upper/lower extremities; Turn/Repo every 2 hours   PAIN MANAGMENT: Denies  DIET: Pureed (Level 4) + Mildly thickened liquids (level 2)  BOWEL/BLADDER: Chronic chirinos, patent; incontinent of bowel-1 BM this shift  ABNL LAB/BG:   DRAIN/DEVICES: Chirinos, R PIV saline locked   SKIN: Wounds on ears (from O2 use)-tubing wrapped in new Mepilex with vaseline applied onto ear per WOC RN; dry skin; +2 Edema to bilateral feet   TESTS/PROCEDURES: n/a  D/C DATE: Possibly 04/09 back to [facility] facility

## 2023-04-10 ENCOUNTER — HOSPITAL ENCOUNTER (INPATIENT)
Facility: CLINIC | Age: 83
LOS: 4 days | Discharge: HOME HEALTH | DRG: 177 | End: 2023-04-14
Attending: EMERGENCY MEDICINE | Admitting: INTERNAL MEDICINE
Payer: MEDICARE

## 2023-04-10 ENCOUNTER — PATIENT OUTREACH (OUTPATIENT)
Dept: CARE COORDINATION | Facility: CLINIC | Age: 83
End: 2023-04-10
Payer: MEDICARE

## 2023-04-10 ENCOUNTER — APPOINTMENT (OUTPATIENT)
Dept: GENERAL RADIOLOGY | Facility: CLINIC | Age: 83
DRG: 177 | End: 2023-04-10
Attending: EMERGENCY MEDICINE
Payer: MEDICARE

## 2023-04-10 DIAGNOSIS — J18.9 PNEUMONIA DUE TO INFECTIOUS ORGANISM, UNSPECIFIED LATERALITY, UNSPECIFIED PART OF LUNG: Primary | ICD-10-CM

## 2023-04-10 DIAGNOSIS — R06.03 RESPIRATORY DIFFICULTY: ICD-10-CM

## 2023-04-10 DIAGNOSIS — J96.01 ACUTE RESPIRATORY FAILURE WITH HYPOXIA (H): ICD-10-CM

## 2023-04-10 DIAGNOSIS — J69.0 ASPIRATION PNEUMONIA OF BOTH LOWER LOBES DUE TO REGURGITATED FOOD (H): ICD-10-CM

## 2023-04-10 DIAGNOSIS — J96.11 CHRONIC RESPIRATORY FAILURE WITH HYPOXIA (H): ICD-10-CM

## 2023-04-10 LAB
ALBUMIN SERPL BCG-MCNC: 3 G/DL (ref 3.5–5.2)
ALP SERPL-CCNC: 74 U/L (ref 40–129)
ALT SERPL W P-5'-P-CCNC: 21 U/L (ref 10–50)
ANION GAP SERPL CALCULATED.3IONS-SCNC: 10 MMOL/L (ref 7–15)
AST SERPL W P-5'-P-CCNC: 22 U/L (ref 10–50)
ATRIAL RATE - MUSE: 90 BPM
ATRIAL RATE - MUSE: 92 BPM
BASE EXCESS BLDV CALC-SCNC: 5.3 MMOL/L (ref -7.7–1.9)
BASE EXCESS BLDV CALC-SCNC: 5.3 MMOL/L (ref -7.7–1.9)
BASOPHILS # BLD AUTO: 0 10E3/UL (ref 0–0.2)
BASOPHILS NFR BLD AUTO: 0 %
BILIRUB SERPL-MCNC: 0.4 MG/DL
BUN SERPL-MCNC: 13.7 MG/DL (ref 8–23)
CALCIUM SERPL-MCNC: 8.7 MG/DL (ref 8.8–10.2)
CHLORIDE SERPL-SCNC: 101 MMOL/L (ref 98–107)
CREAT SERPL-MCNC: 0.8 MG/DL (ref 0.67–1.17)
DEPRECATED HCO3 PLAS-SCNC: 28 MMOL/L (ref 22–29)
DIASTOLIC BLOOD PRESSURE - MUSE: NORMAL MMHG
DIASTOLIC BLOOD PRESSURE - MUSE: NORMAL MMHG
EOSINOPHIL # BLD AUTO: 0.4 10E3/UL (ref 0–0.7)
EOSINOPHIL NFR BLD AUTO: 4 %
ERYTHROCYTE [DISTWIDTH] IN BLOOD BY AUTOMATED COUNT: 16.1 % (ref 10–15)
GFR SERPL CREATININE-BSD FRML MDRD: 89 ML/MIN/1.73M2
GLUCOSE BLDC GLUCOMTR-MCNC: 188 MG/DL (ref 70–99)
GLUCOSE BLDC GLUCOMTR-MCNC: 193 MG/DL (ref 70–99)
GLUCOSE SERPL-MCNC: 151 MG/DL (ref 70–99)
HCO3 BLDV-SCNC: 31 MMOL/L (ref 21–28)
HCT VFR BLD AUTO: 40.1 % (ref 40–53)
HGB BLD-MCNC: 12.5 G/DL (ref 13.3–17.7)
HOLD SPECIMEN: NORMAL
IMM GRANULOCYTES # BLD: 0.2 10E3/UL
IMM GRANULOCYTES NFR BLD: 2 %
INTERPRETATION ECG - MUSE: NORMAL
INTERPRETATION ECG - MUSE: NORMAL
LACTATE BLD-SCNC: 0.9 MMOL/L
LACTATE BLD-SCNC: 1.1 MMOL/L
LYMPHOCYTES # BLD AUTO: 1 10E3/UL (ref 0.8–5.3)
LYMPHOCYTES NFR BLD AUTO: 10 %
MCH RBC QN AUTO: 31.3 PG (ref 26.5–33)
MCHC RBC AUTO-ENTMCNC: 31.2 G/DL (ref 31.5–36.5)
MCV RBC AUTO: 100 FL (ref 78–100)
MONOCYTES # BLD AUTO: 0.5 10E3/UL (ref 0–1.3)
MONOCYTES NFR BLD AUTO: 5 %
NEUTROPHILS # BLD AUTO: 7.4 10E3/UL (ref 1.6–8.3)
NEUTROPHILS NFR BLD AUTO: 79 %
NRBC # BLD AUTO: 0 10E3/UL
NRBC BLD AUTO-RTO: 0 /100
NT-PROBNP SERPL-MCNC: 2833 PG/ML (ref 0–1800)
O2/TOTAL GAS SETTING VFR VENT: 35 %
O2/TOTAL GAS SETTING VFR VENT: 35 %
OXYHGB MFR BLDV: 80 % (ref 70–75)
P AXIS - MUSE: 24 DEGREES
P AXIS - MUSE: 46 DEGREES
PCO2 BLDV: 48 MM HG (ref 40–50)
PCO2 BLDV: 48 MM HG (ref 40–50)
PCO2 BLDV: 50 MM HG (ref 40–50)
PCO2 BLDV: 55 MM HG (ref 40–50)
PH BLDV: 7.36 [PH] (ref 7.32–7.43)
PH BLDV: 7.41 [PH] (ref 7.32–7.43)
PH BLDV: 7.42 [PH] (ref 7.32–7.43)
PH BLDV: 7.42 [PH] (ref 7.32–7.43)
PLATELET # BLD AUTO: 247 10E3/UL (ref 150–450)
PO2 BLDV: 34 MM HG (ref 25–47)
PO2 BLDV: 36 MM HG (ref 25–47)
PO2 BLDV: 45 MM HG (ref 25–47)
PO2 BLDV: 45 MM HG (ref 25–47)
POTASSIUM SERPL-SCNC: 3.7 MMOL/L (ref 3.4–5.3)
PR INTERVAL - MUSE: 172 MS
PR INTERVAL - MUSE: 176 MS
PROCALCITONIN SERPL IA-MCNC: 0.08 NG/ML
PROT SERPL-MCNC: 7.2 G/DL (ref 6.4–8.3)
QRS DURATION - MUSE: 134 MS
QRS DURATION - MUSE: 140 MS
QT - MUSE: 416 MS
QT - MUSE: 424 MS
QTC - MUSE: 514 MS
QTC - MUSE: 518 MS
R AXIS - MUSE: -18 DEGREES
R AXIS - MUSE: 45 DEGREES
RBC # BLD AUTO: 4 10E6/UL (ref 4.4–5.9)
SAO2 % BLDV: 64 % (ref 94–100)
SAO2 % BLDV: 65 % (ref 94–100)
SARS-COV-2 RNA RESP QL NAA+PROBE: NEGATIVE
SODIUM SERPL-SCNC: 139 MMOL/L (ref 136–145)
SYSTOLIC BLOOD PRESSURE - MUSE: NORMAL MMHG
SYSTOLIC BLOOD PRESSURE - MUSE: NORMAL MMHG
T AXIS - MUSE: -19 DEGREES
T AXIS - MUSE: 30 DEGREES
TROPONIN T SERPL HS-MCNC: 35 NG/L
VENTRICULAR RATE- MUSE: 90 BPM
VENTRICULAR RATE- MUSE: 92 BPM
WBC # BLD AUTO: 9.4 10E3/UL (ref 4–11)

## 2023-04-10 PROCEDURE — 99223 1ST HOSP IP/OBS HIGH 75: CPT | Mod: AI | Performed by: INTERNAL MEDICINE

## 2023-04-10 PROCEDURE — 93005 ELECTROCARDIOGRAM TRACING: CPT

## 2023-04-10 PROCEDURE — 83880 ASSAY OF NATRIURETIC PEPTIDE: CPT | Performed by: EMERGENCY MEDICINE

## 2023-04-10 PROCEDURE — 250N000011 HC RX IP 250 OP 636: Performed by: INTERNAL MEDICINE

## 2023-04-10 PROCEDURE — 82803 BLOOD GASES ANY COMBINATION: CPT | Performed by: INTERNAL MEDICINE

## 2023-04-10 PROCEDURE — 99291 CRITICAL CARE FIRST HOUR: CPT | Mod: 25,CS

## 2023-04-10 PROCEDURE — 99292 CRITICAL CARE ADDL 30 MIN: CPT | Mod: CS

## 2023-04-10 PROCEDURE — 36415 COLL VENOUS BLD VENIPUNCTURE: CPT | Performed by: INTERNAL MEDICINE

## 2023-04-10 PROCEDURE — 85025 COMPLETE CBC W/AUTO DIFF WBC: CPT | Performed by: EMERGENCY MEDICINE

## 2023-04-10 PROCEDURE — 80053 COMPREHEN METABOLIC PANEL: CPT | Performed by: EMERGENCY MEDICINE

## 2023-04-10 PROCEDURE — 87040 BLOOD CULTURE FOR BACTERIA: CPT | Performed by: EMERGENCY MEDICINE

## 2023-04-10 PROCEDURE — 999N000157 HC STATISTIC RCP TIME EA 10 MIN

## 2023-04-10 PROCEDURE — 250N000012 HC RX MED GY IP 250 OP 636 PS 637: Performed by: INTERNAL MEDICINE

## 2023-04-10 PROCEDURE — U0003 INFECTIOUS AGENT DETECTION BY NUCLEIC ACID (DNA OR RNA); SEVERE ACUTE RESPIRATORY SYNDROME CORONAVIRUS 2 (SARS-COV-2) (CORONAVIRUS DISEASE [COVID-19]), AMPLIFIED PROBE TECHNIQUE, MAKING USE OF HIGH THROUGHPUT TECHNOLOGIES AS DESCRIBED BY CMS-2020-01-R: HCPCS | Performed by: EMERGENCY MEDICINE

## 2023-04-10 PROCEDURE — C9113 INJ PANTOPRAZOLE SODIUM, VIA: HCPCS | Performed by: INTERNAL MEDICINE

## 2023-04-10 PROCEDURE — 5A09357 ASSISTANCE WITH RESPIRATORY VENTILATION, LESS THAN 24 CONSECUTIVE HOURS, CONTINUOUS POSITIVE AIRWAY PRESSURE: ICD-10-PCS | Performed by: EMERGENCY MEDICINE

## 2023-04-10 PROCEDURE — 250N000011 HC RX IP 250 OP 636: Performed by: EMERGENCY MEDICINE

## 2023-04-10 PROCEDURE — 36415 COLL VENOUS BLD VENIPUNCTURE: CPT | Performed by: EMERGENCY MEDICINE

## 2023-04-10 PROCEDURE — 84145 PROCALCITONIN (PCT): CPT | Performed by: EMERGENCY MEDICINE

## 2023-04-10 PROCEDURE — 84484 ASSAY OF TROPONIN QUANT: CPT | Performed by: EMERGENCY MEDICINE

## 2023-04-10 PROCEDURE — C9803 HOPD COVID-19 SPEC COLLECT: HCPCS

## 2023-04-10 PROCEDURE — 120N000013 HC R&B IMCU

## 2023-04-10 PROCEDURE — 96374 THER/PROPH/DIAG INJ IV PUSH: CPT

## 2023-04-10 PROCEDURE — 71045 X-RAY EXAM CHEST 1 VIEW: CPT

## 2023-04-10 PROCEDURE — 82805 BLOOD GASES W/O2 SATURATION: CPT | Performed by: INTERNAL MEDICINE

## 2023-04-10 PROCEDURE — 83605 ASSAY OF LACTIC ACID: CPT

## 2023-04-10 PROCEDURE — 120N000001 HC R&B MED SURG/OB

## 2023-04-10 RX ORDER — IBUPROFEN 200 MG
CAPSULE ORAL DAILY PRN
COMMUNITY
End: 2023-05-04

## 2023-04-10 RX ORDER — FUROSEMIDE 10 MG/ML
60 INJECTION INTRAMUSCULAR; INTRAVENOUS ONCE
Status: COMPLETED | OUTPATIENT
Start: 2023-04-10 | End: 2023-04-10

## 2023-04-10 RX ORDER — LIDOCAINE 40 MG/G
CREAM TOPICAL
Status: DISCONTINUED | OUTPATIENT
Start: 2023-04-10 | End: 2023-04-14 | Stop reason: HOSPADM

## 2023-04-10 RX ORDER — DEXTROSE MONOHYDRATE 25 G/50ML
25-50 INJECTION, SOLUTION INTRAVENOUS
Status: DISCONTINUED | OUTPATIENT
Start: 2023-04-10 | End: 2023-04-11

## 2023-04-10 RX ORDER — CARBOXYMETHYLCELLULOSE SODIUM 5 MG/ML
1 SOLUTION/ DROPS OPHTHALMIC
Status: DISCONTINUED | OUTPATIENT
Start: 2023-04-10 | End: 2023-04-14 | Stop reason: HOSPADM

## 2023-04-10 RX ORDER — METHYLPREDNISOLONE SODIUM SUCCINATE 125 MG/2ML
60 INJECTION, POWDER, LYOPHILIZED, FOR SOLUTION INTRAMUSCULAR; INTRAVENOUS EVERY 8 HOURS
Status: DISCONTINUED | OUTPATIENT
Start: 2023-04-11 | End: 2023-04-11

## 2023-04-10 RX ORDER — ENOXAPARIN SODIUM 100 MG/ML
40 INJECTION SUBCUTANEOUS EVERY 24 HOURS
Status: DISCONTINUED | OUTPATIENT
Start: 2023-04-10 | End: 2023-04-14 | Stop reason: HOSPADM

## 2023-04-10 RX ORDER — LIDOCAINE 40 MG/G
CREAM TOPICAL
Status: DISCONTINUED | OUTPATIENT
Start: 2023-04-10 | End: 2023-04-10

## 2023-04-10 RX ORDER — IBUPROFEN 200 MG
CAPSULE ORAL
COMMUNITY
End: 2023-05-04

## 2023-04-10 RX ORDER — METHYLPREDNISOLONE SODIUM SUCCINATE 125 MG/2ML
125 INJECTION, POWDER, LYOPHILIZED, FOR SOLUTION INTRAMUSCULAR; INTRAVENOUS ONCE
Status: COMPLETED | OUTPATIENT
Start: 2023-04-10 | End: 2023-04-10

## 2023-04-10 RX ORDER — PIPERACILLIN SODIUM, TAZOBACTAM SODIUM 3; .375 G/15ML; G/15ML
3.38 INJECTION, POWDER, LYOPHILIZED, FOR SOLUTION INTRAVENOUS EVERY 8 HOURS
Status: DISCONTINUED | OUTPATIENT
Start: 2023-04-10 | End: 2023-04-11

## 2023-04-10 RX ORDER — FUROSEMIDE 10 MG/ML
60 INJECTION INTRAMUSCULAR; INTRAVENOUS
Status: COMPLETED | OUTPATIENT
Start: 2023-04-11 | End: 2023-04-11

## 2023-04-10 RX ORDER — NICOTINE POLACRILEX 4 MG
15-30 LOZENGE BUCCAL
Status: DISCONTINUED | OUTPATIENT
Start: 2023-04-10 | End: 2023-04-11

## 2023-04-10 RX ORDER — NITROGLYCERIN 0.4 MG/1
0.4 TABLET SUBLINGUAL EVERY 5 MIN PRN
Status: DISCONTINUED | OUTPATIENT
Start: 2023-04-10 | End: 2023-04-14 | Stop reason: HOSPADM

## 2023-04-10 RX ADMIN — METHYLPREDNISOLONE SODIUM SUCCINATE 62.5 MG: 125 INJECTION, POWDER, FOR SOLUTION INTRAMUSCULAR; INTRAVENOUS at 23:31

## 2023-04-10 RX ADMIN — INSULIN ASPART 2 UNITS: 100 INJECTION, SOLUTION INTRAVENOUS; SUBCUTANEOUS at 22:23

## 2023-04-10 RX ADMIN — FUROSEMIDE 60 MG: 10 INJECTION, SOLUTION INTRAMUSCULAR; INTRAVENOUS at 14:03

## 2023-04-10 RX ADMIN — PANTOPRAZOLE SODIUM 40 MG: 40 INJECTION, POWDER, FOR SOLUTION INTRAVENOUS at 22:23

## 2023-04-10 RX ADMIN — METHYLPREDNISOLONE SODIUM SUCCINATE 125 MG: 125 INJECTION, POWDER, FOR SOLUTION INTRAMUSCULAR; INTRAVENOUS at 14:23

## 2023-04-10 RX ADMIN — PIPERACILLIN AND TAZOBACTAM 3.38 G: 3; .375 INJECTION, POWDER, FOR SOLUTION INTRAVENOUS at 23:31

## 2023-04-10 RX ADMIN — ENOXAPARIN SODIUM 40 MG: 40 INJECTION SUBCUTANEOUS at 18:34

## 2023-04-10 RX ADMIN — PIPERACILLIN AND TAZOBACTAM 3.38 G: 3; .375 INJECTION, POWDER, FOR SOLUTION INTRAVENOUS at 16:08

## 2023-04-10 ASSESSMENT — ACTIVITIES OF DAILY LIVING (ADL)
ADLS_ACUITY_SCORE: 37
ADLS_ACUITY_SCORE: 35
ADLS_ACUITY_SCORE: 35
ADLS_ACUITY_SCORE: 37
ADLS_ACUITY_SCORE: 37
ADLS_ACUITY_SCORE: 35

## 2023-04-10 ASSESSMENT — ENCOUNTER SYMPTOMS: SHORTNESS OF BREATH: 1

## 2023-04-10 NOTE — PLAN OF CARE
Speech Language Therapy Discharge Summary    Reason for therapy discharge:    Discharged to long term care facility.    Progress towards therapy goal(s). See goals on Care Plan in [facility] electronic health record for goal details.  Goals partially met.  Barriers to achieving goals:   discharge from facility.    Therapy recommendation(s):    Continued therapy is recommended.  Rationale/Recommendations:  SLP follow up at care facility for management of dysphagia and consideration of repeat OP VFSS.        At time of discharge - Continue puree solids, mildly thick liquids (IDDSI 4, 2) via single cup sips or spoon (spoon only if increased difficulty with cup). 1:1 supervision/assistance, feed only when fully alert, small bites, slow rate and alternate liquids/solids. Hold diet if sx of aspiration present or increased oxygen needs.

## 2023-04-10 NOTE — ED TRIAGE NOTES
Patient presents via EMS. Per report, patient comes form [facility] living. Recent hospitalization for UTI and sepsis, discharged yesterday. EMS reports patients baseline is AOX4 and 1.5-2 L NC. Nurses at assisted living went to check on patient today and he had increased WOB and dyspnea. 1 duoneb given but still 82% on 2 liters with wet lungs per medics. Patient on 15 liters oxymask on arrival. . Transitioned to bipap by RT.      Triage Assessment     Row Name 04/10/23 1242       Triage Assessment (Adult)    Airway WDL WDL       Respiratory WDL    Respiratory WDL X;rhythm/pattern    Rhythm/Pattern, Respiratory shortness of breath;labored       Skin Circulation/Temperature WDL    Skin Circulation/Temperature WDL WDL       Cardiac WDL    Cardiac WDL WDL       Peripheral/Neurovascular WDL    Peripheral Neurovascular WDL WDL       Cognitive/Neuro/Behavioral WDL    Cognitive/Neuro/Behavioral WDL WDL

## 2023-04-10 NOTE — PLAN OF CARE
Orientation: A&Ox3, disoriented to time   Vitals/Pain: VSS on 35% bipap sating >90%, weaned to 3L NC while awake. Pt denies pain   Tele: NSR   Lines/Drains: R PIV saline locked. Chronic chirinos cath in place   LS: diminished with crackles   GI/: BS +, x) BM this shift. Pt having adequate urine output throughout shift   Labs: Abnormal/Trends, Electrolyte Replacement- VBG recheck   Ambulation/Assist: q2h turn, x2 and a lift. Pulsate mattress ordered   Skin/Wounds: coccyx pressure injury present on admission, mepilex in place,   Plan: IV lasix, wean O2 as pt tolerates back to 2L NC baseline     Goal Outcome Evaluation:  Plan of Care Reviewed With: patient  Overall Patient Progress: improving  Outcome Evaluation: Pt CO2 improving, will trail off Bipap once new VBG comes back. Pt now down to 35% Bipap sating >90%

## 2023-04-10 NOTE — PROGRESS NOTES
Patient admitted to ED with SOB.   Placed on CPAP/BiPAP Settings  IPAP: 14  EPAP: 8  Rate: 12  FiO2: 50  Timed Inspiration (sec): .9      RT to continue to follow.

## 2023-04-10 NOTE — ED NOTES
Noted that due to an IT issue, vital signs were not crossing over from the Space lab monitor into Epic. The problem has now been corrected. VSS remain stable at this time. Will continue to monitor.

## 2023-04-10 NOTE — ED PROVIDER NOTES
History     Chief Complaint:  Shortness of Breath       The history is provided by the EMS personnel and the patient.      Ron Gilmore is a 82 year old male with a DNR/DNI order with a history of DVT, CVA, COPD, hypertension, CHF, and type 2 diabetes who presents via EMS from his permanent residence at an assisted living facility with shortness of breath and hypoxia.  Per EMS, he was discharged from EMS yesterday for possible aspiration vs HCAP pneumonia and UTI; discharged on Augmentin.  He also had shown decreased mentation at this time.  This morning at his assisted living facility, he appeared to have returned to baseline mentation, per nurse report to EMS.  Approximately 45 minutes ago, one of his nurse's came to his room and found him to have profound dyspnea with decreased mentation.  His oxygen level was 85% on his 2 L chronic oxygen.  Upon EMS arrival, patient was still hypoxic with wet lung sounds.  Oxygen was increased to 15 L on oxymask.  EMS notes his history of CVA with left-sided deficits. Other history includes possible afib, CHF, respiratory distress, and DVT.  He does not take blood thinning medications.      Independent Historian:   EMS - They report history as noted above.    Review of External Notes: I reviewed the discharge summary from yesterday.  He was admitted to [facility] from 4/3-4/9/23 for aspiration pneumonia.  During this time, he had an elevated troponin secondary to sepsis.  He was adminstered IV Zosyn and IV azithromycin and steroids and started on Augmentin after discharge yesterday.  His chronic oxygen was also increased from 1.5 L to 2 L    ROS:  Review of Systems   Respiratory: Positive for shortness of breath.    All other systems reviewed and are negative.      Allergies:  No Known Allergies     Medications:    Augmentin  Lasix  Ellipta inhaler  Allopurinol  Aspirin  Atorvastatin  Albuterol duoneb  Hiprex  Metoprolol  Pantoprazole  Paroxetine  Simethicone     Past Medical  History:    BPH  COPD  Depression   Type 2 diabetes   Hyperlipidemia  Hypertension  Spinal stenosis  CHF  Respiratory distress  Pneumonia   DVT  CVA   Sepsis      Past Surgical History:    Appendectomy  Arthroscopy shoulder rotator cuff repair  Cataract removal, bilateral  Cholecystectomy  Colonoscopy x2  Cystoscopy x2  TUR prostate  Loop recorder implant  EGD x2  Right eyelid surgery  IVC filter placement  Nephrostomy tube placement, right  Ureteral stent placement, right  Hip replacement, right  Knee surgery, bilateral  Laminectomy lumbar  Laser holmium lithotripsy ureters  Tonsillectomy      Family History:    Prostate cancer    Social History:  The patient presents via EMS from assisted living facility  The patient presents alone   PCP: Kalen Metcalf     Physical Exam     Patient Vitals for the past 24 hrs:   BP Temp Temp src Pulse Resp SpO2   04/10/23 1255 -- 97  F (36.1  C) Temporal -- -- 96 %   04/10/23 1241 (!) 117/92 -- -- 86 26 98 %        Physical Exam  Appearance: appears stated age, well-groomed, appears sedated and tiring out, able to answer questions in 1-2 word phrases  Eyes: EOMI, PERRL, no scleral injection, no icterus  ENT:TMs clear, nl external auditory canals, bilateral nares clear, MMM,   Cardiovascular: RRR, nl S1S2, no m/r/g, 2+ pulses in all 4 extremities, cap refill <2sec  Respiratory: diffuse rhonchi, on oximask, increased WOB, tachypnea  Back: no lesions, no midline ttp, no muscular spasms palpated, no CVA ttp  GI: abd soft and obese, non-distended, nttp, no HSM, no rebound, no guarding, nl BS  MSK: DALEY, good tone, no bony abnormality, no joint abnormality  Skin: warm and well-perfused, no rash, no edema, no ecchymosis, nl turgor  Neuro: GCS 14 (E3), oriented but sedated appearing, no focal neuro deficits  Heme: no petechia, no purpura, no active bleeding  Lymph: no cervical LAD      Emergency Department Course   ECG  ECG taken at 1238, ECG read at 1242  Sinus rhythm with frequent  premature ventricular complexes  Right bundle branch black  Abnormal ECG   as compared to prior, dated 4/4/23.  Rate 90 bpm. NJ interval 176 ms. QRS duration 140 ms. QT/QTc 424/518 ms. P-R-T axes 24 -18 -19.     Imaging:  XR Chest Port 1 View   Final Result   IMPRESSION: Low lung volumes. Emphysema is better seen on the prior CT   on 4/3/2023. Stable mild cardiomegaly. No pleural effusion or   pneumothorax. Bibasilar linear opacities, either atelectasis or   infiltrate. Implanted device left chest.      PENNY DUBON MD            SYSTEM ID:  [id number]         Report per radiology    Laboratory:  Labs Ordered and Resulted from Time of ED Arrival to Time of ED Departure   COMPREHENSIVE METABOLIC PANEL - Abnormal       Result Value    Sodium 139      Potassium 3.7      Chloride 101      Carbon Dioxide (CO2) 28      Anion Gap 10      Urea Nitrogen 13.7      Creatinine 0.80      Calcium 8.7 (*)     Glucose 151 (*)     Alkaline Phosphatase 74      AST 22      ALT 21      Protein Total 7.2      Albumin 3.0 (*)     Bilirubin Total 0.4      GFR Estimate 89     TROPONIN T, HIGH SENSITIVITY - Abnormal    Troponin T, High Sensitivity 35 (*)    NT PROBNP INPATIENT - Abnormal    N terminal Pro BNP Inpatient 2,833 (*)    PROCALCITONIN - Abnormal    Procalcitonin 0.08 (*)    CBC WITH PLATELETS AND DIFFERENTIAL - Abnormal    WBC Count 9.4      RBC Count 4.00 (*)     Hemoglobin 12.5 (*)     Hematocrit 40.1            MCH 31.3      MCHC 31.2 (*)     RDW 16.1 (*)     Platelet Count 247      % Neutrophils 79      % Lymphocytes 10      % Monocytes 5      % Eosinophils 4      % Basophils 0      % Immature Granulocytes 2      NRBCs per 100 WBC 0      Absolute Neutrophils 7.4      Absolute Lymphocytes 1.0      Absolute Monocytes 0.5      Absolute Eosinophils 0.4      Absolute Basophils 0.0      Absolute Immature Granulocytes 0.2      Absolute NRBCs 0.0     ISTAT GASES LACTATE VENOUS POCT - Abnormal    Lactic Acid POCT 1.1       Bicarbonate Venous POCT 31 (*)     O2 Sat, Venous POCT 65 (*)     pCO2V Venous POCT 55 (*)     pH Venous POCT 7.36      pO2 Venous POCT 36     COVID-19 VIRUS (CORONAVIRUS) BY PCR   BLOOD CULTURE   BLOOD CULTURE        Emergency Department Course & Assessments:    Interventions:  Medications   methylPREDNISolone sodium succinate (solu-MEDROL) injection 125 mg (has no administration in time range)   furosemide (LASIX) injection 60 mg (60 mg Intravenous $Given 4/10/23 1403)        Assessments:  1234 Patient arrived to the ED room via EMS.  Initial assessment.  I gathered history and examined the patient as noted above.  1455 Pt much more alert on BiPap. Updated him on results.    Independent Interpretation (X-rays, CTs, rhythm strip):  None    Consultations/Discussion of Management or Tests:  1410 Dr Blanca    Social Determinants of Health affecting care:   None    Disposition:  The patient was admitted to the hospital under the care of Dr. Blanca.     Impression & Plan      Medical Decision Making:  This patient is an 82-year-old male who was just discharged yesterday after a weeklong stay for healthcare acquired pneumonia versus aspiration pneumonia and a UTI.  In the hospital he was treated with Zosyn and azithromycin and he was transitioned to Augmentin upon discharge.  He presents as he is redeveloped acute respiratory distress this afternoon.  Clinical picture is not totally convincing for 1 pathology versus another.  I considered continued infection however he is afebrile, has a normal white count, has a low procalcitonin, has no obvious infiltrate on x-ray.  I considered COPD however he has a very wet sounding lungs so he is being given Solu-Medrol and DuoNebs but I think this is less likely the cause.  I considered heart failure as his BNP is elevated compared to previous BNP's however he does not have obvious fluid overload on x-ray or physical exam.  I am giving him 60 Lasix so given how wet his lungs  sounded.  When he arrived on mask he appeared to be tiring out so we did put him on BiPAP which appears to be helping.  He will be admitted to the hospitalist service for further management.  Critical Care Time: 45 min  Diagnosis:    ICD-10-CM    1. Respiratory difficulty  R06.03            Discharge Medications:  New Prescriptions    No medications on file          Scribe Disclosure:  I, Marina Manuel, am serving as a scribe at 1:22 PM on 4/10/2023 to document services personally performed by Shadia Uribe MD based on my observations and the provider's statements to me.   4/10/2023   Shadia Uribe MD Mahoney, Katherine Collins, MD  04/10/23 1295       Shadia Uribe MD  04/10/23 6032

## 2023-04-10 NOTE — ED NOTES
[facility]  ED Nurse Handoff Report    ED Chief complaint: Shortness of Breath      ED Diagnosis:   Final diagnoses:   None       Code Status: DNR    Allergies: No Known Allergies    Patient Story: SOB  Focused Assessment:  82 year old male with a DNR/DNI order with a history of DVT, CVA, COPD, hypertension, CHF, and type 2 diabetes who presents via EMS from his permanent residence at an assisted living facility with shortness of breath and hypoxia.  Per EMS, he was discharged from EMS yesterday for possible aspiration vs HCAP pneumonia and UTI; discharged on Augmentin.  He also had shown decreased mentation at this time.  This morning at his assisted living facility, he appeared to have returned to baseline mentation, per nurse report to EMS.  Approximately 45 minutes ago, one of his nurse's came to his room and found him to have profound dyspnea with decreased mentation.  His oxygen level was 85% on his 2 L chronic oxygen.  Upon EMS arrival, patient was still hypoxic with wet lung sounds.  Oxygen was increased to 15 L on oxymask.  EMS notes his history of CVA with left-sided deficits. Other history includes possible afib, CHF, respiratory distress, and DVT.      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               Family Comments:     OBS brochure/video discussed/provided to patient/family: N/A              Name of person given brochure if not patient:                 Relationship to patient:       For the majority of the shift this patient's behavior was Green.   Behavioral interventions performed were   .    ED NURSE PHONE NUMBER: [id number]

## 2023-04-10 NOTE — PHARMACY-ADMISSION MEDICATION HISTORY
Pharmacist Admission Medication History    Admission medication history is complete. The information provided in this note is only as accurate as the sources available at the time of the update.    Medication reconciliation/reorder completed by provider prior to medication history? No    Information Source(s): Facility ([facility]/) medication list/MAR via med list sent from [facility] (not an MAR)    Pertinent Information: Scheduled duoneb, combivent, and anoro ellipta all listed as scheduled meds    Changes made to PTA medication list:    Added: vaseline and triple antibiotic ointment    Deleted: None    Changed: None    Medication Affordability:  Not including over the counter (OTC) medications, was there a time in the past 12 months when you did not take your medications as prescribed because of cost?: Unable to Assess    Allergies reviewed with patient and updates made in EHR: yes    Medication History Completed By: Shawnee Bradley RPH 4/10/2023 4:20 PM      PTA Med List   Medication Sig Last Dose     allopurinol (ZYLOPRIM) 300 MG tablet Take 300 mg by mouth every morning 4/10/2023 at am     ammonium lactate (LAC-HYDRIN) 12 % external lotion Apply topically daily Apply thin film to bilateral feet and legs 4/10/2023 at am     amoxicillin-clavulanate (AUGMENTIN) 875-125 MG tablet Take 1 tablet by mouth 2 times daily for 3 days 4/10/2023 at am     aspirin (ASA) 81 MG EC tablet Take 1 tablet (81 mg) by mouth daily 4/10/2023 at am     atorvastatin (LIPITOR) 10 MG tablet Take 10 mg by mouth every morning 4/10/2023 at am     cyanocobalamin (VITAMIN B-12) 500 MCG tablet Take 500 mcg by mouth every morning 4/10/2023 at am     furosemide (LASIX) 20 MG tablet Take 1.5 tablets (30 mg) by mouth daily 4/10/2023 at am     hypromellose (ARTIFICIAL TEARS) 0.5 % SOLN ophthalmic solution 2 drops 2 times daily Into the affected eye (eye irritation)  0900, 2000 4/10/2023 at am     ipratropium - albuterol 0.5 mg/2.5 mg/3 mL  (DUONEB) 0.5-2.5 (3) MG/3ML neb solution Take 1 vial by nebulization 3 times daily 0900, 1400, 2100 4/10/2023 at am     ipratropium-albuterol (COMBIVENT RESPIMAT)  MCG/ACT inhaler Inhale 1 puff into the lungs 4 times daily 0900, 1200 ,1600 ,2000 4/10/2023 at am     methenamine hippurate (HIPREX) 1 g tablet Take 1 g by mouth 2 times daily 4/10/2023 at am     metoprolol tartrate (LOPRESSOR) 25 MG tablet Take 0.5 tablets (12.5 mg) by mouth 2 times daily 4/10/2023 at am     Neomycin-Bacitracin-Polymyxin (TRIPLE ANTIBIOTIC) 3.5-400-5000 OINT ointment Externally apply topically three times a week To left inner buttocks until resolved 4/10/2023     Neomycin-Bacitracin-Polymyxin (TRIPLE ANTIBIOTIC) 3.5-400-5000 OINT ointment Externally apply topically daily as needed (to open area) prn     pantoprazole (PROTONIX) 40 MG EC tablet Take 40 mg by mouth 2 times daily 4/10/2023 at am     PARoxetine (PAXIL) 10 MG tablet Take 10 mg by mouth every morning Take with 40mg tablet to equal 50mg total 4/10/2023 at am     PARoxetine (PAXIL) 40 MG tablet Take 40 mg by mouth every morning Take with 10mg tablet to equal 50mg total 4/10/2023 at am     polyethylene glycol (MIRALAX) 17 GM/Dose powder Take 17 g by mouth daily 4/10/2023 at am     senna-docusate (SENOKOT-S/PERICOLACE) 8.6-50 MG tablet Take 1 tablet by mouth daily 4/10/2023 at m     Skin Protectants, Misc. (LANTISEPTIC SKIN PROTECTANT EX) Externally apply topically 2 times daily Apply a thin film to vincent area/buttocks 4/10/2023 at am     umeclidinium-vilanterol (ANORO ELLIPTA) 62.5-25 MCG/ACT oral inhaler Inhale 1 puff into the lungs daily 4/10/2023 at am     White Petrolatum ointment Apply topically 2 times daily To nares 4/10/2023 at am     Time Spent on this activity: 20 minutes

## 2023-04-10 NOTE — PROGRESS NOTES
[facility] [place]    Background: Transitional Care Management program identified per system criteria and reviewed by [facility] team for possible outreach.    Assessment: Upon chart review, [facility] Team member will not proceed with patient outreach related to this episode of Transitional Care Management program due to reason below:    Patient has discharged to a Memory Care, Long-term Care, Assisted Living or Group Home where patient is receiving on-site support with their daily cares, including support with hospital follow up plan.     Discharged to assisted living facility    Plan: Transitional Care Management episode addressed appropriately per reason noted above.          BRANDY Cooper  [facility] [place], [facility]    *Connected Care Resource Team does NOT follow patient ongoing. Referrals are identified based on internal discharge reports and the outreach is to ensure patient has an understanding of their discharge instructions.

## 2023-04-10 NOTE — H&P
[facility]    History and Physical - Hospitalist Service       Date of Admission:  4/10/2023    Assessment & Plan      Ron Gilmore is a 82 year old male who was actually just hospitalized from 4/3-4/9 for acute hypoxic respiratory failure thought secondary to possible aspiration vs healthcare associated pneumonia and discharged back to his [facility] on 4/9.  He returned to our ED on 4/10/2023 due to worsening respiratory failure    Acute hypoxic respiratory failure   Possible COPD exacerbation   Patient was at his [facility] this morning when they noted the patient to have increased work of breathing and hypoxic on his baseline 2 L.  Patient was satting in to the 80s on 2 L.  Initially in the ED he was on 15 L and subsequently had to be placed on BiPAP.  On exam in the ED patient had rhonchi.  BNP is elevated at 2,833.  Troponin minimally elevated at 35.  Procalcitonin 0.08. Initial VBG was 7.36/55/36 but improved with BiPAP.  CXR showed low lung volume, emphysema, mild cardiomegaly but no other obvious edema.    - Admit to [facility]MC  - Titrate off BiPAP as able and titrate O2 back to baseline  - IV Solu-medrol 125 mg given.  Will continue 60 mg q8h   - PTA Anoro Ellipta   - RCAT     Possible decompensated CHF   HTN/HLP   CXR in the ED showed cardiomegaly.  BNP elevated and rhonchi on exam.  Could be patient has newly found CHF.  Last echo from 5/16/21 showed a normal EF of 60-65% with moderate to severe concentric LVH but no RWMA or significant valvular disease    - Telemetry  - Strict I/Os and daily weights  - Lasix 60 IV given in the ED.  Will continue with 60 mg q12h tomorrow  - Echocardiogram ordered  - Holding PTA Metoprolool while on BiPAP.  Can consider transitioning to IV Lopressor scheduled if blood pressures are elevated     Recent aspiration vs healthcare associated pneumonia  Patient was just discharged from the hospital for this on 4/9.  While here he was treated with IV Zosyn and  Azithromycin and discharged on Augmentin.  Unlikely this is the cause of his new respiratory failure   - Switched back to IV Zosyn as patient is now on BiPAP to complete 3 more days of therapy     DM type II with neuropathy   HgbA1c 6.9 during last hospitalization.  Does not appear to be on any medications for this   - Medium intensity SSI q4h for now.  Suspect hyperglycemia with steroids     Stable chronic medical issues - Resume PTA medications as appropriate   H/o CVA - persistent left sided deficits   Dysphagia   BPH w/chronic indwelling chirinos catheter       Diet:   NPO for now   DVT Prophylaxis: Enoxaparin (Lovenox) SQ  Chirinos Catheter: PRESENT, indication: Strict 1-2 Hour I&O;Retention  Lines: None     Cardiac Monitoring: None  Code Status:   DNR.  Pre-arrest intubation okay     Clinically Significant Risk Factors Present on Admission              # Hypoalbuminemia: Lowest albumin = 3 g/dL at 4/10/2023 12:43 PM, will monitor as appropriate       # Non-Invasive mechanical ventilation: current O2 Device: BiPAP/CPAP  # Acute hypoxic respiratory failure: continue supplemental O2 as needed    # DMII: A1C = 6.9 % (Ref range: <5.7 %) within past 6 months            Disposition Plan      Expected Discharge Date: 04/12/2023                  Hernando Blanca DO  Hospitalist Service  [facility]  Securely message with [employer] (more info)  Text page via [employer] Paging/Directory     ______________________________________________________________________    Chief Complaint   SOB     History is limited by patient's acute hypoxic respiratory failure and currently being on BiPAP     History of Present Illness   Ron Gilmore is a 82 year old male who was actually just hospitalized from 4/3-4/9 for acute hypoxic respiratory failure thought secondary to possible aspiration vs healthcare associated pneumonia and discharged back to his [facility] on 4/9.  He returned to our ED on 4/10/2023 due to worsening  respiratory failure.  Patient was at his [facility] this morning when they noted the patient to have increased work of breathing and hypoxic on his baseline 2 L.  Patient was satting in to the 80s on 2 L.  Initially in the ED he was on 15 L and subsequently had to be placed on BiPAP.  Patient afebrile.  No reports of vomiting or a cough.  Unable to assess further due to patient's respiratory status       Past Medical History    Past Medical History:   Diagnosis Date     BPH (benign prostatic hyperplasia)      Cataract      Cholelithiasis      COPD (chronic obstructive pulmonary disease) (H)      Depression      Diabetes mellitus     Type 2     Dyshidrotic foot dermatitis      Edema      Gout      Hyperlipidemia      Hypertension      Infection due to 2019 novel coronavirus 5/1/2021     Kidney stones     Bladder Stones     Lumbago      Lumbar disc displacement without myelopathy      Muscle weakness      Neuropathy, diabetic (H)      Obesity      Spinal stenosis      Stroke (H)     with residual effects- weakness LUE> LLE     Unsteady gait      Urinary retention with incomplete bladder emptying      UTI (urinary tract infection)      Vasovagal episode        Past Surgical History   Past Surgical History:   Procedure Laterality Date     APPENDECTOMY OPEN       ARTHROSCOPY SHOULDER ROTATOR CUFF REPAIR       cataracts Bilateral      CHOLECYSTECTOMY       COLONOSCOPY  1986     COLONOSCOPY N/A 5/29/2021    Procedure: COLONOSCOPY;  Surgeon: Kofi Davis MD;  Location:  GI     CYSTOSCOPY  10/19/2011    Procedure:CYSTOSCOPY; CYSTOSCOPY, BLADDER STONE REMOVAL; Surgeon:ROB SAWYER; Location: OR     CYSTOSCOPY, TRANSURETHRAL RESECTION (TUR) PROSTATE, COMBINED N/A 2/21/2018    Procedure: COMBINED CYSTOSCOPY, TRANSURETHRAL RESECTION (TUR) PROSTATE;  COMBINED CYSTOSCOPY, TRANSURETHRAL RESECTION (TUR) PROSTATE ;  Surgeon: Rob Sawyer MD;  Location:  OR     EP LOOP RECORDER IMPLANT N/A 1/20/2020    Procedure: EP  Loop Recorder Implant;  Surgeon: Evgeny Parisi MD;  Location:  [facility]     ESOPHAGOSCOPY, GASTROSCOPY, DUODENOSCOPY (EGD), COMBINED N/A 5/28/2021    Procedure: ESOPHAGOGASTRODUODENOSCOPY (EGD);  Surgeon: Aurora Waterman MD;  Location:  GI     ESOPHAGOSCOPY, GASTROSCOPY, DUODENOSCOPY (EGD), DILATATION, COMBINED N/A 9/24/2022    Procedure: ESOPHAGOGASTRODUODENOSCOPY, WITH DILATION;  Surgeon: Kofi Davis MD;  Location:  GI     EYE SURGERY      right lid surgery      IR IVC FILTER PLACEMENT  5/24/2021     IR NEPHROSTOMY TUBE PLACEMENT RIGHT  3/9/2021     IR URETERAL STENT PLACEMENT RIGHT  3/16/2021     JOINT REPLACEMENT Right     HIP     KNEE SURGERY Bilateral      LAMINECTOMY LUMBAR ONE LEVEL       LASER HOLMIUM LITHOTRIPSY URETER(S), INSERT STENT, COMBINED Right 4/14/2021    Procedure: CYSTOSCOPY, BLADDER STONE REMOVAL, RIGHT URETEROSCOPY, HOLMIUM LASER LITHOTRIPSY, AND RIGHT STENT REMOVAL, RIGHT RETROGRADE;  Surgeon: Rob Sullivan MD;  Location:  OR     TONSILLECTOMY         Prior to Admission Medications   Prior to Admission Medications   Prescriptions Last Dose Informant Patient Reported? Taking?   Emollient (AMLACTIN ULTRA EX)  Nursing Home Yes No   Sig: Apply topically as needed TO FEET   PARoxetine (PAXIL) 10 MG tablet  Nursing Home Yes No   Sig: Take 10 mg by mouth every morning Take with 40mg tablet to equal 50mg total   PARoxetine (PAXIL) 40 MG tablet  Nursing Home Yes No   Sig: Take 40 mg by mouth every morning Take with 10mg tablet to equal 50mg total   Skin Protectants, Misc. (LANTISEPTIC SKIN PROTECTANT EX)  Nursing Home Yes No   Sig: Externally apply topically 2 times daily as needed Apply a thin film to vincent area/buttocks   Skin Protectants, Misc. (LANTISEPTIC SKIN PROTECTANT EX)  Nursing Home Yes No   Sig: Externally apply topically 2 times daily Apply a thin film to vincent area/buttocks   acetaminophen (TYLENOL) 325 MG tablet  Nursing Home Yes No   Sig: Take  650 mg by mouth every 4 hours as needed for mild pain as needed for pain/fever Max dose 3000mg/24hr   allopurinol (ZYLOPRIM) 300 MG tablet  Nursing Home Yes No   Sig: Take 300 mg by mouth every morning   ammonium lactate (LAC-HYDRIN) 12 % external lotion  Nursing Home Yes No   Sig: Apply topically daily Apply thin film to bilateral feet and legs   amoxicillin-clavulanate (AUGMENTIN) 875-125 MG tablet   No No   Sig: Take 1 tablet by mouth 2 times daily for 3 days   aspirin (ASA) 81 MG EC tablet  Nursing Home No No   Sig: Take 1 tablet (81 mg) by mouth daily   atorvastatin (LIPITOR) 10 MG tablet  Nursing Home Yes No   Sig: Take 10 mg by mouth every morning   cyanocobalamin (VITAMIN B-12) 500 MCG tablet  Nursing Home Yes No   Sig: Take 500 mcg by mouth every morning   furosemide (LASIX) 20 MG tablet   No No   Sig: Take 1.5 tablets (30 mg) by mouth daily   hypromellose (ARTIFICIAL TEARS) 0.5 % SOLN ophthalmic solution  Nursing Home Yes No   Si drops 2 times daily Into the affected eye (eye irritation)  0900, 2000   hypromellose (ARTIFICIAL TEARS) 0.5 % SOLN ophthalmic solution  Nursing Home Yes No   Si drop 2 times daily as needed (eye irritation) Into affected eye   ipratropium - albuterol 0.5 mg/2.5 mg/3 mL (DUONEB) 0.5-2.5 (3) MG/3ML neb solution  Nursing Home Yes No   Sig: Take 1 vial by nebulization daily as needed for shortness of breath, wheezing or cough   ipratropium - albuterol 0.5 mg/2.5 mg/3 mL (DUONEB) 0.5-2.5 (3) MG/3ML neb solution  Nursing Home Yes No   Sig: Take 1 vial by nebulization 3 times daily 0900, 1400, 2100   ipratropium-albuterol (COMBIVENT RESPIMAT)  MCG/ACT inhaler  Nursing Home Yes No   Sig: Inhale 1 puff into the lungs 4 times daily 0900, 1200 ,1600 ,2000   loperamide (IMODIUM) 2 MG capsule  Nursing Home Yes No   Sig: Take 2 mg by mouth every 6 hours as needed for diarrhea   methenamine hippurate (HIPREX) 1 g tablet  Nursing Home Yes No   Sig: Take 1 g by mouth 2 times daily    metoprolol tartrate (LOPRESSOR) 25 MG tablet  Nursing Home No No   Sig: Take 0.5 tablets (12.5 mg) by mouth 2 times daily   pantoprazole (PROTONIX) 40 MG EC tablet  Nursing Home Yes No   Sig: Take 40 mg by mouth 2 times daily   polyethylene glycol (MIRALAX) 17 GM/Dose powder  Nursing Home No No   Sig: Take 17 g by mouth daily   senna-docusate (SENOKOT-S/PERICOLACE) 8.6-50 MG tablet  Nursing Home Yes No   Sig: Take 1 tablet by mouth daily   senna-docusate (SENOKOT-S/PERICOLACE) 8.6-50 MG tablet  Nursing Home Yes No   Sig: Take 1 tablet by mouth daily as needed for constipation   simethicone (MYLICON) 125 MG chewable tablet  Nursing Home Yes No   Sig: Take 125 mg by mouth 3 times daily as needed for intestinal gas   umeclidinium-vilanterol (ANORO ELLIPTA) 62.5-25 MCG/ACT oral inhaler   No No   Sig: Inhale 1 puff into the lungs daily      Facility-Administered Medications: None        Review of Systems    Review of systems not obtained due to patient factors - critical condition    Social History   I have reviewed this patient's social history and updated it with pertinent information if needed.  Social History     Tobacco Use     Smoking status: Former     Smokeless tobacco: Never   Substance Use Topics     Alcohol use: No     Comment: none for 29 yrs     Drug use: No       Family History   I have reviewed this patient's family history and updated it with pertinent information if needed.  Family History   Problem Relation Age of Onset     Prostate Cancer Father        Allergies   No Known Allergies     Physical Exam   Vital Signs: Temp: 97  F (36.1  C) Temp src: Temporal BP: 114/77 Pulse: 85   Resp: 19 SpO2: 94 % O2 Device: BiPAP/CPAP Oxygen Delivery: 50 LPM  Weight: 0 lbs 0 oz    General Appearance: Resting in bed.  NAD  Eyes: Eyes closed.  Briefly opens  HEENT: NC/AT  Respiratory: BiPAP on.  No respiratory distress on BiPAP.  Difficult to auscultate further   Cardiovascular: RRR.  Difficult to auscultate further  due to BiPAP  GI: Soft.  Non-distended   Skin: No obvious rashes or cyanosis  Musculoskeletal: No significant edema.  No bony abnormalities  Neurologic: Moving extremities grossly.  Unable to assess further  Psychiatric: Alert.  Unable to assess further     Medical Decision Making       100 MINUTES SPENT BY ME on the date of service doing chart review, history, exam, documentation & further activities per the note.      Data   ------------------------- PAST 24 HR DATA REVIEWED -----------------------------------------------    I have personally reviewed the following data over the past 24 hrs:    9.4  \   12.5 (L)   / 247     139 101 13.7 /  151 (H)   3.7 28 0.80 \       ALT: 21 AST: 22 AP: 74 TBILI: 0.4   ALB: 3.0 (L) TOT PROTEIN: 7.2 LIPASE: N/A       Trop: 35 (H) BNP: 2,833 (H)       Procal: 0.08 (H) CRP: N/A Lactic Acid: 0.9         Imaging results reviewed over the past 24 hrs:   Recent Results (from the past 24 hour(s))   XR Chest Port 1 View    Narrative    XR CHEST PORT 1 VIEW 4/10/2023 12:50 PM    HISTORY: SOB    COMPARISON: 4/4/2023      Impression    IMPRESSION: Low lung volumes. Emphysema is better seen on the prior CT  on 4/3/2023. Stable mild cardiomegaly. No pleural effusion or  pneumothorax. Bibasilar linear opacities, either atelectasis or  infiltrate. Implanted device left chest.    PENNY DUBON MD         SYSTEM ID:  [id number]

## 2023-04-11 LAB
ANION GAP SERPL CALCULATED.3IONS-SCNC: 12 MMOL/L (ref 7–15)
BUN SERPL-MCNC: 20.3 MG/DL (ref 8–23)
CALCIUM SERPL-MCNC: 8.9 MG/DL (ref 8.8–10.2)
CHLORIDE SERPL-SCNC: 99 MMOL/L (ref 98–107)
CREAT SERPL-MCNC: 0.89 MG/DL (ref 0.67–1.17)
DEPRECATED HCO3 PLAS-SCNC: 29 MMOL/L (ref 22–29)
GFR SERPL CREATININE-BSD FRML MDRD: 87 ML/MIN/1.73M2
GLUCOSE BLDC GLUCOMTR-MCNC: 149 MG/DL (ref 70–99)
GLUCOSE BLDC GLUCOMTR-MCNC: 155 MG/DL (ref 70–99)
GLUCOSE BLDC GLUCOMTR-MCNC: 161 MG/DL (ref 70–99)
GLUCOSE BLDC GLUCOMTR-MCNC: 164 MG/DL (ref 70–99)
GLUCOSE BLDC GLUCOMTR-MCNC: 170 MG/DL (ref 70–99)
GLUCOSE BLDC GLUCOMTR-MCNC: 182 MG/DL (ref 70–99)
GLUCOSE SERPL-MCNC: 172 MG/DL (ref 70–99)
PLATELET # BLD AUTO: 245 10E3/UL (ref 150–450)
POTASSIUM SERPL-SCNC: 3.6 MMOL/L (ref 3.4–5.3)
SODIUM SERPL-SCNC: 140 MMOL/L (ref 136–145)

## 2023-04-11 PROCEDURE — 250N000013 HC RX MED GY IP 250 OP 250 PS 637: Performed by: INTERNAL MEDICINE

## 2023-04-11 PROCEDURE — 85049 AUTOMATED PLATELET COUNT: CPT | Performed by: INTERNAL MEDICINE

## 2023-04-11 PROCEDURE — 250N000011 HC RX IP 250 OP 636: Performed by: INTERNAL MEDICINE

## 2023-04-11 PROCEDURE — 120N000001 HC R&B MED SURG/OB

## 2023-04-11 PROCEDURE — G0463 HOSPITAL OUTPT CLINIC VISIT: HCPCS

## 2023-04-11 PROCEDURE — 36415 COLL VENOUS BLD VENIPUNCTURE: CPT | Performed by: INTERNAL MEDICINE

## 2023-04-11 PROCEDURE — 99233 SBSQ HOSP IP/OBS HIGH 50: CPT | Performed by: INTERNAL MEDICINE

## 2023-04-11 PROCEDURE — 82310 ASSAY OF CALCIUM: CPT | Performed by: INTERNAL MEDICINE

## 2023-04-11 PROCEDURE — C9113 INJ PANTOPRAZOLE SODIUM, VIA: HCPCS | Performed by: INTERNAL MEDICINE

## 2023-04-11 RX ORDER — PIPERACILLIN SODIUM, TAZOBACTAM SODIUM 3; .375 G/15ML; G/15ML
3.38 INJECTION, POWDER, LYOPHILIZED, FOR SOLUTION INTRAVENOUS EVERY 6 HOURS
Status: DISCONTINUED | OUTPATIENT
Start: 2023-04-11 | End: 2023-04-12

## 2023-04-11 RX ORDER — METHYLPREDNISOLONE SODIUM SUCCINATE 125 MG/2ML
60 INJECTION, POWDER, LYOPHILIZED, FOR SOLUTION INTRAMUSCULAR; INTRAVENOUS EVERY 24 HOURS
Status: DISCONTINUED | OUTPATIENT
Start: 2023-04-12 | End: 2023-04-13

## 2023-04-11 RX ORDER — ATORVASTATIN CALCIUM 10 MG/1
10 TABLET, FILM COATED ORAL EVERY MORNING
Status: DISCONTINUED | OUTPATIENT
Start: 2023-04-11 | End: 2023-04-14 | Stop reason: HOSPADM

## 2023-04-11 RX ORDER — PAROXETINE 10 MG/1
10 TABLET, FILM COATED ORAL EVERY MORNING
Status: DISCONTINUED | OUTPATIENT
Start: 2023-04-11 | End: 2023-04-14 | Stop reason: HOSPADM

## 2023-04-11 RX ORDER — METHYLPREDNISOLONE SODIUM SUCCINATE 125 MG/2ML
60 INJECTION, POWDER, LYOPHILIZED, FOR SOLUTION INTRAMUSCULAR; INTRAVENOUS EVERY 24 HOURS
Status: DISCONTINUED | OUTPATIENT
Start: 2023-04-12 | End: 2023-04-11

## 2023-04-11 RX ORDER — DEXTROSE MONOHYDRATE 25 G/50ML
25-50 INJECTION, SOLUTION INTRAVENOUS
Status: DISCONTINUED | OUTPATIENT
Start: 2023-04-11 | End: 2023-04-14 | Stop reason: HOSPADM

## 2023-04-11 RX ORDER — ALLOPURINOL 300 MG/1
300 TABLET ORAL EVERY MORNING
Status: DISCONTINUED | OUTPATIENT
Start: 2023-04-11 | End: 2023-04-14 | Stop reason: HOSPADM

## 2023-04-11 RX ORDER — PAROXETINE 40 MG/1
40 TABLET, FILM COATED ORAL EVERY MORNING
Status: DISCONTINUED | OUTPATIENT
Start: 2023-04-11 | End: 2023-04-14 | Stop reason: HOSPADM

## 2023-04-11 RX ORDER — ASPIRIN 81 MG/1
81 TABLET ORAL DAILY
Status: DISCONTINUED | OUTPATIENT
Start: 2023-04-11 | End: 2023-04-14 | Stop reason: HOSPADM

## 2023-04-11 RX ORDER — NICOTINE POLACRILEX 4 MG
15-30 LOZENGE BUCCAL
Status: DISCONTINUED | OUTPATIENT
Start: 2023-04-11 | End: 2023-04-14 | Stop reason: HOSPADM

## 2023-04-11 RX ADMIN — INSULIN ASPART 1 UNITS: 100 INJECTION, SOLUTION INTRAVENOUS; SUBCUTANEOUS at 02:56

## 2023-04-11 RX ADMIN — FUROSEMIDE 60 MG: 10 INJECTION, SOLUTION INTRAMUSCULAR; INTRAVENOUS at 08:47

## 2023-04-11 RX ADMIN — UMECLIDINIUM BROMIDE AND VILANTEROL TRIFENATATE 1 PUFF: 62.5; 25 POWDER RESPIRATORY (INHALATION) at 09:10

## 2023-04-11 RX ADMIN — PANTOPRAZOLE SODIUM 40 MG: 40 INJECTION, POWDER, FOR SOLUTION INTRAVENOUS at 08:48

## 2023-04-11 RX ADMIN — PANTOPRAZOLE SODIUM 40 MG: 40 INJECTION, POWDER, FOR SOLUTION INTRAVENOUS at 22:01

## 2023-04-11 RX ADMIN — PIPERACILLIN AND TAZOBACTAM 3.38 G: 3; .375 INJECTION, POWDER, FOR SOLUTION INTRAVENOUS at 08:48

## 2023-04-11 RX ADMIN — PIPERACILLIN AND TAZOBACTAM 3.38 G: 3; .375 INJECTION, POWDER, FOR SOLUTION INTRAVENOUS at 14:27

## 2023-04-11 RX ADMIN — ENOXAPARIN SODIUM 40 MG: 40 INJECTION SUBCUTANEOUS at 18:08

## 2023-04-11 RX ADMIN — METHYLPREDNISOLONE SODIUM SUCCINATE 62.5 MG: 125 INJECTION, POWDER, FOR SOLUTION INTRAMUSCULAR; INTRAVENOUS at 08:48

## 2023-04-11 RX ADMIN — PIPERACILLIN AND TAZOBACTAM 3.38 G: 3; .375 INJECTION, POWDER, FOR SOLUTION INTRAVENOUS at 22:01

## 2023-04-11 RX ADMIN — INSULIN ASPART 1 UNITS: 100 INJECTION, SOLUTION INTRAVENOUS; SUBCUTANEOUS at 06:19

## 2023-04-11 RX ADMIN — FUROSEMIDE 60 MG: 10 INJECTION, SOLUTION INTRAMUSCULAR; INTRAVENOUS at 18:08

## 2023-04-11 ASSESSMENT — ACTIVITIES OF DAILY LIVING (ADL)
ADLS_ACUITY_SCORE: 39
ADLS_ACUITY_SCORE: 45
ADLS_ACUITY_SCORE: 35
ADLS_ACUITY_SCORE: 37
ADLS_ACUITY_SCORE: 35
ADLS_ACUITY_SCORE: 45
ADLS_ACUITY_SCORE: 37
ADLS_ACUITY_SCORE: 45
ADLS_ACUITY_SCORE: 35

## 2023-04-11 NOTE — CONSULTS
[facility] Nurse Inpatient Assessment     Consulted for: Coccyx    Patient History (according to provider note(s):    Ron Gilmore is a 82 year old male with PMH of COPD on chronic oxygen, hx CVA, chronic indwelling chirinos catheter, DM2, HTN, HLD, BPH, depression, neuropathy, spinal stenosis, hx GIB, wheechair bound, who is being admitted for acute hypoxic respiratory failure.     Areas Assessed:      Areas visualized during today's visit: Sacrum/coccyx and ears    Skin Injury Location: BL buttock/Coccyx    Last photo: 4/11/23 4/4/23          Skin injury due to: Mixed Etiology: Chronic skin injury (often related to prolonged recliner use), Friction and Incontinence associated dermatitis (IAD)  Skin history and plan of care:   Pt reports significant amt of sitting and states his bottom does get sore. Good improvement over the past week.  Pt able to turn independently onto his left side. Due to respiratory requirements pt sitting up higher on oxygen  Affected area:      Skin assessment:BL Buttock with non blanchable deep purple to light purple/pink erythema. Scant peeling epidermal erosion to Left buttock      Measurements (length x width x depth, in cm) Area 7  x 11  x  0 cm  With 1 x 1 x 0.1cm to left buttock     Color: pink and purple     Temperature  normal      Drainage: scant     Color: serous     Odor: none  Pain: denies , none  Pain interventions prior to dressing change: N/A  Treatment goal: Decrease moisture, Maintain (prevention of deterioration) and Protection  STATUS: initial assessment this admission  Supplies ordered: at bedside    Pressure Injury Location: BL ears    Last photo: 4/11/23      Right Ear  4/11/23                                                              Left ear 4/11/23 4/4/23      Right Ear 4/4          Left Ear 4/4      Wound type: Pressure Injury     Pressure Injury Stage: 2, present on admission to BL tops of ears due to chronic use of  oxygen tubing, showing improvement     This is a Medical Device Related Pressure Injury (MDRPI) due to oxygen tubing  Wound history/plan of care:  Pt with chronic wounds to tops and lateral (helix) with open and scabbed areas.    Wound base: Mixed dried crusty drainage and pink dermis      Palpation of the wound bed: normal      Drainage: scant     Description of drainage: bloody     Measurements (length x width x depth, in cm) Top of ears both approx 0.3 x 0.3 x 0.1cm and L outer 3 small open areas less than 0.5 x 0.5 x 0.1 cm     Periwound skin: Intact      Color: normal and consistent with surrounding tissue      Temperature: normal   Odor: none  Pain: mild, tender  Pain intervention prior to dressing change: slow and gentle cares   Treatment goal: Heal   STATUS: initial assessment this admission  Supplies ordered: at bedside    Treatment Plan:     BL buttock wound(s): every three days and PRN  1. Clean wound with saline or MicroKlenz Spray, pat dry  2. Wipe entire coccyx/buttock area with Cavilon No Sting Skin Prep #[id number] and allow to dry. This will help protect periwound and help dressing adherence  3. Press a Mepilex  Sacral Dressing (PS#[id number])  to the area, making sure to conform nicely to skin curvatures.   4. Time and date dressing change  NOTE  -Reposition pt side to side sheila when in bed, every 2 hours-get the pt way over on side to completely offload pressure. This will benefit skin and respiratory function   -Keep heels elevated and floating on pillows at all times. Try using at least 2 pillows under each calf.  -When up to the chair pt needs to fully offload every 2 hours and use a chair cushion if needed       BL ears: Daily  1. Cleanse with wound cleanser and blot dry  2. Apply a thin layer of vaseline to wounds on both ears  3. Apply 1/2 Mepilex wrapped around oxygen tubing to offload        Orders: Written    RECOMMEND PRIMARY TEAM ORDER: None, at this time  Education provided: importance of  repositioning, plan of care, Moisture management and Off-loading pressure  Discussed plan of care with: Patient and Nurse  WOC nurse follow-up plan: weekly  Notify WOC if wound(s) deteriorate.  Nursing to notify the Provider(s) and re-consult the WOC Nurse if new skin concern.    DATA:     Current support surface: Standard  Atmos Air mattress  Containment of urine/stool: Incontinence Protocol  BMI: Body mass index is 31.04 kg/m .   Active diet order: Orders Placed This Encounter      Pureed Diet (level 4) Mildly Thick (level 2)     Output: I/O last 3 completed shifts:  In: -   Out: 1450 [Urine:1450]     Labs:   Recent Labs   Lab 04/10/23  1243   ALBUMIN 3.0*   HGB 12.5*   WBC 9.4     Pressure injury risk assessment:   Sensory Perception: 3-->slightly limited  Moisture: 3-->occasionally moist  Activity: 2-->chairfast  Mobility: 2-->very limited  Nutrition: 2-->probably inadequate  Friction and Shear: 1-->problem  Lon Score: 13    Ron Grant RN CWOCN  -Securely message with [employer] (more info) - can reach individually by name or search 'WOC Nurse' (Corrina) to reach all current WOCs on duty.  WOC Office Phone: [phone]

## 2023-04-11 NOTE — PROVIDER NOTIFICATION
Patient coughing after receiving mildly thickened liquids via spoon. Speech consulted per MD. Plan for video swallow study. Holding oral medications until assessment completed, Dr. Cavanaugh aware.

## 2023-04-11 NOTE — PLAN OF CARE
IMC status discontinued. Disoriented to situation and place at times. AVSS on 3L NC; 1.5 L NC baseline. Tele SR w/ BBB and PVC.  Repositioned Q2H, pulsate mattress placed. Denies pain. LS diminished. Chronic chirinos in place, adequate output. Attempted spoon of mildly thickened liquids and patient did not tolerate, speech consulted per MD. Video swallow study pending. Oral cares as needed.  Bilateral ear and buttock wound care completed per orders. Contact precautions continued.

## 2023-04-11 NOTE — PLAN OF CARE
Goal Outcome Evaluation:    Pt. A&O x2, disoriented to time and situation. Stable vitals on 3 lpm via NC overnight. Pt. denies pain, sob or trouble breathing. Diminished lung sounds with some crackles in bases.Tele: NSR. NPO. Strict I/O. Active BS, no BM , passing flatus at this shift. Cardona cath in place for retention, inadequate urine output at this shift (provider notified). 1x PIV, R arm, saline locked. Turn/repo q2h. Ax2 with lift. Sacral/coccyx area pressure injury noted,mepilex applied. Scattered bruising. BG checks with insulin coverage.      .

## 2023-04-11 NOTE — PROGRESS NOTES
[facility]    Medicine Progress Note - Hospitalist Service        Date of Admission:  4/10/2023 12:37 PM    Assessment & Plan:   Ron Gilmore is a 82 year old male who was actually just hospitalized from 4/3-4/9 for acute hypoxic respiratory failure thought secondary to possible aspiration vs healthcare associated pneumonia and discharged back to his [facility] on 4/9.  He returned to our ED on 4/10/2023 due to worsening respiratory failure     Acute hypoxic respiratory failure   Suspected aspiration  Possible COPD acute exacerbation   -Patient was at his [facility] this morning when they noted the patient to have increased work of breathing and hypoxic on his baseline 2 L.  Patient was satting in to the 80s on 2 L.  Initially in the ED he was on 15 L and subsequently had to be placed on BiPAP.  On exam in the ED patient had rhonchi.  BNP is elevated at 2,833.  Troponin minimally elevated at 35.  Procalcitonin 0.08. Initial VBG was 7.36/55/36 but improved with BiPAP.  CXR showed low lung volume, emphysema, mild cardiomegaly but no other obvious edema.    -Initially on BiPAP, weaned off to oxygen at 3 L/min this morning  -Discontinue IMC  -Decrease Solu-Medrol to 60 mg IV daily  -No evidence of new pneumonia, complete previous course of antibiotics as discussed below.    Dysphagia  -Patient has known dysphagia and I suspect acute respiratory decompensation yesterday was most likely related to another silent aspiration event  -SLP evaluation     Suspected acute exacerbation of chronic heart failure with preserved EF  Hypertension  Hyperlipidemia  -CXR in the ED showed cardiomegaly.  BNP elevated and rhonchi on exam. Last echo from 5/16/21 showed a normal EF of 60-65% with moderate to severe concentric LVH but no RWMA or significant valvular disease    -Telemetry for 24 hours  -Strict I/Os and daily weights  -Patient has a known history of diastolic congestive heart failure  -Continue Lasix 60 mg IV twice a day  for today  -Likely will resume prior to admission regimen of 30 mg daily starting tomorrow  -Continue prior to admission metoprolol    Recent aspiration vs healthcare associated pneumonia  -Patient was just discharged from the hospital for this on 4/9.  While here he was treated with IV Zosyn and Azithromycin and discharged on Augmentin.    -Switched back to IV Zosyn as patient is was initially placed on BiPAP  -Continue Zosyn to complete his PTA course(2 days more)     DM type II with neuropathy   HgbA1c 6.9 during last hospitalization.  Does not appear to be on any medications for this   -High intensity sliding scale given concurrent use of steroids     Stable chronic medical issues     H/o CVA - persistent left sided deficits   Dysphagia   BPH w/chronic indwelling chirinos catheter       Diet: Pureed Diet (level 4) Mildly Thick (level 2)     DVT Prophylaxis: Pneumatic Compression Devices   Chirinos Catheter: PRESENT, indication: Other (Comment) (chronic)  Code Status: No CPR- Pre-arrest intubation OK     Disposition Plan      Expected Discharge Date: 04/12/2023              Entered: Romulo Cavanaugh MD 04/11/2023, 9:21 AM        Clinically Significant Risk Factors Present on Admission              # Hypoalbuminemia: Lowest albumin = 3 g/dL at 4/10/2023 12:43 PM, will monitor as appropriate         # DMII: A1C = 6.9 % (Ref range: <5.7 %) within past 6 months    # Obesity: Estimated body mass index is 31.04 kg/m  as calculated from the following:    Height as of this encounter: 1.829 m (6').    Weight as of this encounter: 103.8 kg (228 lb 13.4 oz).              The patient's care was discussed with the Bedside Nurse and Patient.    Medical Decision Making       **CLEAR ALL SELECTIONS**        Labs/Imaging Reviewed:  See Information above and Data section below    Time SPENT BY ME on the date of service doing chart review, history, exam, documentation & further activities per the note:  50 MINUTES    Romulo Cavanaugh  MD  Hospitalist Service  [facility]  Text Page 7AM-6PM  Securely message with the [employer] Web Console (learn more here)  Text page via [employer] Paging/Directory    ______________________________________________________________________    Interval History   Dyspnea much better.  Weaned off BiPAP.  Currently on 3 L oxygen.  Denies chest pain.  Afebrile.    Data reviewed today: I reviewed all medications, new labs and imaging results over the last 24 hours. I personally reviewed no images or EKG's today.    Physical Exam   Vital signs:  Temp: 97.2  F (36.2  C) Temp src: Oral BP: 137/87 Pulse: 82   Resp: 15 SpO2: 93 % O2 Device: Nasal cannula Oxygen Delivery: 3 LPM Height: 182.9 cm (6') Weight: 103.8 kg (228 lb 13.4 oz)  Estimated body mass index is 31.04 kg/m  as calculated from the following:    Height as of this encounter: 1.829 m (6').    Weight as of this encounter: 103.8 kg (228 lb 13.4 oz).      Wt Readings from Last 2 Encounters:   04/11/23 103.8 kg (228 lb 13.4 oz)   04/06/23 111.6 kg (246 lb 1.6 oz)       Gen: AAOX2-3, NAD, comfortable  HEENT: Supple neck, moist oral mucosa, no pallor  Resp: Diminished air entry at both the bases, normal effort of breathing  CVS: RRR, no murmur  Abd/GI: Soft, non-tender. BS- normoactive.  No G/R/R  Skin: Warm, dry no rashes  MSK: Trace pedal edema  Neuro- CN- intact.       Data   Recent Labs   Lab 04/11/23  0618 04/11/23  0226 04/10/23  2205 04/10/23  1727 04/10/23  1243 04/09/23  0811 04/09/23  0622 04/08/23  1153 04/08/23  1136 04/07/23  0827 04/07/23 0527 04/06/23  1104 04/06/23  0514 04/05/23 0614 04/04/23 2221   WBC  --   --   --   --  9.4  --   --   --   --   --   --   --  9.1  --  16.6*   HGB  --   --   --   --  12.5*  --   --   --   --   --   --   --  11.1*  --  12.5*   MCV  --   --   --   --  100  --   --   --   --   --   --   --  101*  --  101*   PLT  --   --   --   --  247  --  191  --   --   --   --   --  154  --  183   NA  --   --   --    --  139  --   --   --   --   --   --   --  142  --   --    POTASSIUM  --   --   --   --  3.7  --   --   --  3.9  --  3.9  --  3.7   < >  --    CHLORIDE  --   --   --   --  101  --   --   --   --   --   --   --  105  --   --    CO2  --   --   --   --  28  --   --   --   --   --   --   --  29  --   --    BUN  --   --   --   --  13.7  --   --   --   --   --   --   --  22.9  --   --    CR  --   --   --   --  0.80  --  0.82  --   --   --   --   --  0.89  --   --    ANIONGAP  --   --   --   --  10  --   --   --   --   --   --   --  8  --   --    RAJ  --   --   --   --  8.7*  --   --   --   --   --   --   --  8.7*  --   --    * 170* 193*   < > 151*   < >  --    < >  --    < >  --    < > 128*   < >  --    ALBUMIN  --   --   --   --  3.0*  --   --   --   --   --   --   --   --   --   --    PROTTOTAL  --   --   --   --  7.2  --   --   --   --   --   --   --   --   --   --    BILITOTAL  --   --   --   --  0.4  --   --   --   --   --   --   --   --   --   --    ALKPHOS  --   --   --   --  74  --   --   --   --   --   --   --   --   --   --    ALT  --   --   --   --  21  --   --   --   --   --   --   --   --   --   --    AST  --   --   --   --  22  --   --   --   --   --   --   --   --   --   --     < > = values in this interval not displayed.       Recent Results (from the past 24 hour(s))   XR Chest Port 1 View    Narrative    XR CHEST PORT 1 VIEW 4/10/2023 12:50 PM    HISTORY: SOB    COMPARISON: 4/4/2023      Impression    IMPRESSION: Low lung volumes. Emphysema is better seen on the prior CT  on 4/3/2023. Stable mild cardiomegaly. No pleural effusion or  pneumothorax. Bibasilar linear opacities, either atelectasis or  infiltrate. Implanted device left chest.    PENNY DUBON MD         SYSTEM ID:  [id number]     Medications     - MEDICATION INSTRUCTIONS -       - MEDICATION INSTRUCTIONS -         enoxaparin ANTICOAGULANT  40 mg Subcutaneous Q24H     furosemide  60 mg Intravenous BID     insulin aspart  1-6 Units  Subcutaneous Q4H     methylPREDNISolone  62.5 mg Intravenous Q8H     pantoprazole  40 mg Intravenous BID     piperacillin-tazobactam  3.375 g Intravenous Q8H     sodium chloride (PF)  3 mL Intracatheter Q8H     sodium chloride (PF)  3 mL Intracatheter Q8H     umeclidinium-vilanterol  1 puff Inhalation Daily

## 2023-04-11 NOTE — PROVIDER NOTIFICATION
"Paged provider (Dr. Veloz)  via [facility], \"Pt. Is NPO. VSS on 3 lpm via NC. Cardona in place but inadequate urine output. Can we get fluid infusion order?\"  Provider (Dr. Veloz) responded via phone, \"Pt. has hx of HF, fluid infusion is not needed and keep NPO'.   "

## 2023-04-11 NOTE — PLAN OF CARE
SLP: Swallow evaluation order received. Pt was recently admitted and now re admitted for worsening respiratory status. Pt has a history of aspiration, most recent VFSS was over two years.     Will plan to repeat VFSS during this admission for further assessment of dysphagia and aspiration risk. Recommendations and plan to follow upon completion of VFSS.

## 2023-04-11 NOTE — PROVIDER NOTIFICATION
Notified provider about indwelling chirinos catheter discussed removal or continued need.    Did provider choose to remove indwelling chirinos catheter? No    Provider's chirinos indication for keeping indwelling chirinos catheter: Chronic    Is there an order for indwelling chirinos catheter? Yes    *If there is a plan to keep chirinos catheter in place at discharge daily notification with provider is not necessary, but please add a notation in the treatment team sticky note that the patient will be discharging with the catheter.

## 2023-04-12 ENCOUNTER — APPOINTMENT (OUTPATIENT)
Dept: GENERAL RADIOLOGY | Facility: CLINIC | Age: 83
DRG: 177 | End: 2023-04-12
Attending: INTERNAL MEDICINE
Payer: MEDICARE

## 2023-04-12 ENCOUNTER — APPOINTMENT (OUTPATIENT)
Dept: CARDIOLOGY | Facility: CLINIC | Age: 83
DRG: 177 | End: 2023-04-12
Attending: INTERNAL MEDICINE
Payer: MEDICARE

## 2023-04-12 ENCOUNTER — APPOINTMENT (OUTPATIENT)
Dept: SPEECH THERAPY | Facility: CLINIC | Age: 83
DRG: 177 | End: 2023-04-12
Attending: INTERNAL MEDICINE
Payer: MEDICARE

## 2023-04-12 LAB
ANION GAP SERPL CALCULATED.3IONS-SCNC: 12 MMOL/L (ref 7–15)
BUN SERPL-MCNC: 26.2 MG/DL (ref 8–23)
CALCIUM SERPL-MCNC: 9.1 MG/DL (ref 8.8–10.2)
CHLORIDE SERPL-SCNC: 98 MMOL/L (ref 98–107)
CREAT SERPL-MCNC: 0.96 MG/DL (ref 0.67–1.17)
DEPRECATED HCO3 PLAS-SCNC: 31 MMOL/L (ref 22–29)
GFR SERPL CREATININE-BSD FRML MDRD: 80 ML/MIN/1.73M2
GLUCOSE BLDC GLUCOMTR-MCNC: 132 MG/DL (ref 70–99)
GLUCOSE BLDC GLUCOMTR-MCNC: 139 MG/DL (ref 70–99)
GLUCOSE BLDC GLUCOMTR-MCNC: 142 MG/DL (ref 70–99)
GLUCOSE BLDC GLUCOMTR-MCNC: 172 MG/DL (ref 70–99)
GLUCOSE BLDC GLUCOMTR-MCNC: 190 MG/DL (ref 70–99)
GLUCOSE SERPL-MCNC: 136 MG/DL (ref 70–99)
LVEF ECHO: NORMAL
POTASSIUM SERPL-SCNC: 3.3 MMOL/L (ref 3.4–5.3)
POTASSIUM SERPL-SCNC: 3.8 MMOL/L (ref 3.4–5.3)
SODIUM SERPL-SCNC: 141 MMOL/L (ref 136–145)

## 2023-04-12 PROCEDURE — 82310 ASSAY OF CALCIUM: CPT | Performed by: INTERNAL MEDICINE

## 2023-04-12 PROCEDURE — 36415 COLL VENOUS BLD VENIPUNCTURE: CPT | Performed by: INTERNAL MEDICINE

## 2023-04-12 PROCEDURE — 92610 EVALUATE SWALLOWING FUNCTION: CPT | Mod: GN | Performed by: SPEECH-LANGUAGE PATHOLOGIST

## 2023-04-12 PROCEDURE — 250N000013 HC RX MED GY IP 250 OP 250 PS 637: Performed by: INTERNAL MEDICINE

## 2023-04-12 PROCEDURE — 74230 X-RAY XM SWLNG FUNCJ C+: CPT

## 2023-04-12 PROCEDURE — 120N000001 HC R&B MED SURG/OB

## 2023-04-12 PROCEDURE — 250N000011 HC RX IP 250 OP 636: Performed by: INTERNAL MEDICINE

## 2023-04-12 PROCEDURE — 92526 ORAL FUNCTION THERAPY: CPT | Mod: GN | Performed by: SPEECH-LANGUAGE PATHOLOGIST

## 2023-04-12 PROCEDURE — 92611 MOTION FLUOROSCOPY/SWALLOW: CPT | Mod: GN | Performed by: SPEECH-LANGUAGE PATHOLOGIST

## 2023-04-12 PROCEDURE — 999N000127 HC STATISTIC PERIPHERAL IV START W US GUIDANCE

## 2023-04-12 PROCEDURE — 93306 TTE W/DOPPLER COMPLETE: CPT | Mod: 26 | Performed by: INTERNAL MEDICINE

## 2023-04-12 PROCEDURE — 999N000040 HC STATISTIC CONSULT NO CHARGE VASC ACCESS

## 2023-04-12 PROCEDURE — 255N000002 HC RX 255 OP 636: Performed by: INTERNAL MEDICINE

## 2023-04-12 PROCEDURE — 84132 ASSAY OF SERUM POTASSIUM: CPT | Performed by: INTERNAL MEDICINE

## 2023-04-12 PROCEDURE — 999N000208 ECHOCARDIOGRAM COMPLETE

## 2023-04-12 PROCEDURE — 99232 SBSQ HOSP IP/OBS MODERATE 35: CPT | Performed by: INTERNAL MEDICINE

## 2023-04-12 RX ORDER — PIPERACILLIN SODIUM, TAZOBACTAM SODIUM 4; .5 G/20ML; G/20ML
4.5 INJECTION, POWDER, LYOPHILIZED, FOR SOLUTION INTRAVENOUS EVERY 6 HOURS
Status: COMPLETED | OUTPATIENT
Start: 2023-04-12 | End: 2023-04-12

## 2023-04-12 RX ORDER — POTASSIUM CHLORIDE 1500 MG/1
40 TABLET, EXTENDED RELEASE ORAL ONCE
Status: COMPLETED | OUTPATIENT
Start: 2023-04-12 | End: 2023-04-12

## 2023-04-12 RX ORDER — BARIUM SULFATE 400 MG/ML
60 SUSPENSION ORAL ONCE
Status: COMPLETED | OUTPATIENT
Start: 2023-04-12 | End: 2023-04-12

## 2023-04-12 RX ORDER — ACYCLOVIR 200 MG/1
10 CAPSULE ORAL ONCE
Status: DISCONTINUED | OUTPATIENT
Start: 2023-04-12 | End: 2023-04-14 | Stop reason: HOSPADM

## 2023-04-12 RX ORDER — PANTOPRAZOLE SODIUM 40 MG/1
40 TABLET, DELAYED RELEASE ORAL
Status: DISCONTINUED | OUTPATIENT
Start: 2023-04-12 | End: 2023-04-14 | Stop reason: HOSPADM

## 2023-04-12 RX ORDER — POTASSIUM CHLORIDE 7.45 MG/ML
10 INJECTION INTRAVENOUS
Status: DISCONTINUED | OUTPATIENT
Start: 2023-04-12 | End: 2023-04-12 | Stop reason: ALTCHOICE

## 2023-04-12 RX ADMIN — UMECLIDINIUM BROMIDE AND VILANTEROL TRIFENATATE 1 PUFF: 62.5; 25 POWDER RESPIRATORY (INHALATION) at 12:52

## 2023-04-12 RX ADMIN — ALLOPURINOL 300 MG: 300 TABLET ORAL at 11:11

## 2023-04-12 RX ADMIN — PAROXETINE HYDROCHLORIDE 10 MG: 10 TABLET, FILM COATED ORAL at 11:11

## 2023-04-12 RX ADMIN — BARIUM SULFATE 25 ML: 400 SUSPENSION ORAL at 09:51

## 2023-04-12 RX ADMIN — PIPERACILLIN AND TAZOBACTAM 3.38 G: 3; .375 INJECTION, POWDER, FOR SOLUTION INTRAVENOUS at 02:40

## 2023-04-12 RX ADMIN — POTASSIUM CHLORIDE 10 MEQ: 7.46 INJECTION, SOLUTION INTRAVENOUS at 08:46

## 2023-04-12 RX ADMIN — ENOXAPARIN SODIUM 40 MG: 40 INJECTION SUBCUTANEOUS at 18:28

## 2023-04-12 RX ADMIN — METHYLPREDNISOLONE SODIUM SUCCINATE 62.5 MG: 125 INJECTION, POWDER, FOR SOLUTION INTRAMUSCULAR; INTRAVENOUS at 11:09

## 2023-04-12 RX ADMIN — PIPERACILLIN AND TAZOBACTAM 4.5 G: 4; .5 INJECTION, POWDER, FOR SOLUTION INTRAVENOUS at 16:11

## 2023-04-12 RX ADMIN — ASPIRIN 81 MG: 81 TABLET, COATED ORAL at 11:11

## 2023-04-12 RX ADMIN — PIPERACILLIN AND TAZOBACTAM 4.5 G: 4; .5 INJECTION, POWDER, FOR SOLUTION INTRAVENOUS at 20:08

## 2023-04-12 RX ADMIN — PAROXETINE 40 MG: 40 TABLET, FILM COATED ORAL at 11:12

## 2023-04-12 RX ADMIN — METOPROLOL TARTRATE 12.5 MG: 25 TABLET, FILM COATED ORAL at 20:11

## 2023-04-12 RX ADMIN — POTASSIUM CHLORIDE 40 MEQ: 1500 TABLET, EXTENDED RELEASE ORAL at 12:54

## 2023-04-12 RX ADMIN — METOPROLOL TARTRATE 12.5 MG: 25 TABLET, FILM COATED ORAL at 11:11

## 2023-04-12 RX ADMIN — PIPERACILLIN AND TAZOBACTAM 3.38 G: 3; .375 INJECTION, POWDER, FOR SOLUTION INTRAVENOUS at 11:06

## 2023-04-12 RX ADMIN — HUMAN ALBUMIN MICROSPHERES AND PERFLUTREN 6 ML: 10; .22 INJECTION, SOLUTION INTRAVENOUS at 11:46

## 2023-04-12 RX ADMIN — ATORVASTATIN CALCIUM 10 MG: 10 TABLET, FILM COATED ORAL at 11:11

## 2023-04-12 RX ADMIN — PANTOPRAZOLE SODIUM 40 MG: 40 TABLET, DELAYED RELEASE ORAL at 11:11

## 2023-04-12 ASSESSMENT — ACTIVITIES OF DAILY LIVING (ADL)
WALKING_OR_CLIMBING_STAIRS: TRANSFERRING DIFFICULTY, DEPENDENT
PATIENT'S_PREFERRED_MEANS_OF_COMMUNICATION: VERBAL
DRESS: 2-->COMPLETELY DEPENDENT (NOT DEVELOPMENTALLY APPROPRIATE)
SWALLOWING: 2-->DIFFICULTY SWALLOWING LIQUIDS/FOODS
ADLS_ACUITY_SCORE: 63
ADLS_ACUITY_SCORE: 45
ADLS_ACUITY_SCORE: 45
DIFFICULTY_EATING/SWALLOWING: YES
HEARING_DIFFICULTY_OR_DEAF: NO
TOILETING_ISSUES: YES
WEAR_GLASSES_OR_BLIND: NO
ADLS_ACUITY_SCORE: 69
ADLS_ACUITY_SCORE: 63
ADLS_ACUITY_SCORE: 63
DIFFICULTY_COMMUNICATING: NO
TOILETING_MANAGEMENT: CATHETER
ADLS_ACUITY_SCORE: 69
WERE_AUXILIARY_AIDS_OFFERED?: NO
SWALLOWING: 2-->DIFFICULTY SWALLOWING LIQUIDS/FOODS
TOILETING: 2-->COMPLETELY DEPENDENT (NOT DEVELOPMENTALLY APPROPRIATE)
DRESSING/BATHING: DRESSING DIFFICULTY, DEPENDENT
EATING: 1-->ASSISTANCE (EQUIPMENT/PERSON) NEEDED (NOT DEVELOPMENTALLY APPROPRIATE)
TRANSFERRING: 2-->COMPLETELY DEPENDENT (NOT DEVELOPMENTALLY APPROPRIATE)
DRESSING/BATHING_DIFFICULTY: YES
FALL_HISTORY_WITHIN_LAST_SIX_MONTHS: NO
CONCENTRATING,_REMEMBERING_OR_MAKING_DECISIONS_DIFFICULTY: YES
DOING_ERRANDS_INDEPENDENTLY_DIFFICULTY: YES
ADLS_ACUITY_SCORE: 69
EATING/SWALLOWING: EATING;SWALLOWING LIQUIDS
TRANSFERRING: 2-->COMPLETELY DEPENDENT
TOILETING: 2-->COMPLETELY DEPENDENT
WALKING_OR_CLIMBING_STAIRS_DIFFICULTY: YES
ADLS_ACUITY_SCORE: 45
CHANGE_IN_FUNCTIONAL_STATUS_SINCE_ONSET_OF_CURRENT_ILLNESS/INJURY: NO
TOILETING_ASSISTANCE: TOILETING DIFFICULTY, DEPENDENT
BATHING: 2-->COMPLETELY DEPENDENT (NOT DEVELOPMENTALLY APPROPRIATE)
EATING: 1-->ASSISTANCE (EQUIPMENT/PERSON) NEEDED
ADLS_ACUITY_SCORE: 69
DRESS: 2-->COMPLETELY DEPENDENT
EQUIPMENT_CURRENTLY_USED_AT_HOME: WHEELCHAIR, POWER;LIFT DEVICE
ADLS_ACUITY_SCORE: 69
ADLS_ACUITY_SCORE: 45

## 2023-04-12 NOTE — PLAN OF CARE
Goal Outcome Evaluation:      Plan of Care Reviewed With: patient    Overall Patient Progress: improvingOverall Patient Progress: improving    [facility] Nursing [facility]    Patient Information  Name: Ron Gilmore  Age: 82 year old    Assessment  Orientation/Neuro: Alert and Oriented x4 but forgetful  Cardiac/Tele: NSR with BBB and PVCs  Resp: ROSS   GI/: Incont of bowels, chronic urinary chirinos    Mobility: A2 lift   Pain: denies   Diet: Orders Placed This Encounter      Combination Diet Minced and Moist Diet (level 5); Mildly Thick (level 2) (by spoon)  Procedures/Imaging: echo completed today - results pending    Vital Signs  B/P: 141/88, T: 97.5, P: 83, R: 16, O2: 94% on 3L, wean down to baseline as able    Plan  Discharge back to his facility in 1-2 days    Jud Ervin RN

## 2023-04-12 NOTE — PLAN OF CARE
AxOx3-4, forgetful, intermittently disoriented to place/situation.  Contact precautions. Tele = SR w/ BBB + PVC's.    VSS on 4LNC; intermittently desats to mid-80's then returns to 90's. Assist x1-2 w/ lift; turn and repo q2. NPO until swallow study tomorrow. Cardona in place with adequate output. No BM this shift.    PIV x1 SL. Scheduled abx.    Bilateral scabbing/erythema to ears, LUIS FERNANDO. Pressure injury to coccyx CDI with foam dressing.  Scattered bruises.    Denies pain / nausea.    BG q4: 155, 142, 132.  Insulin given per sliding scale.    Plan for video swallow study today. Continue plan of care.

## 2023-04-12 NOTE — PROGRESS NOTES
[facility]    Medicine Progress Note - Hospitalist Service        Date of Admission:  4/10/2023 12:37 PM    Assessment & Plan:   Ron Gilmore is a 82 year old male who was actually just hospitalized from 4/3-4/9 for acute hypoxic respiratory failure thought secondary to possible aspiration vs healthcare associated pneumonia and discharged back to his [facility] on 4/9.  He returned to our ED on 4/10/2023 due to worsening respiratory failure     Acute hypoxic respiratory failure   Suspected aspiration  Possible COPD acute exacerbation   -Patient was at his [facility] this morning when they noted the patient to have increased work of breathing and hypoxic on his baseline 2 L.  Patient was satting in to the 80s on 2 L.  Initially in the ED he was on 15 L and subsequently had to be placed on BiPAP.  On exam in the ED patient had rhonchi.  BNP is elevated at 2,833.  Troponin minimally elevated at 35.  Procalcitonin 0.08. Initial VBG was 7.36/55/36 but improved with BiPAP.  CXR showed low lung volume, emphysema, mild cardiomegaly but no other obvious edema.    -Initially on BiPAP, weaned BiPAP to nasal cannula at 2-3 L  -Continue Solu-Medrol 60 mg IV daily, anticipate transitioning to prednisone tomorrow  -No evidence of new pneumonia, complete previous course of antibiotics as discussed below.    Dysphagia  -Patient has known dysphagia and I suspect acute respiratory decompensation yesterday was most likely related to another silent aspiration event  -SLP following, patient underwent cervical and video swallow evaluation today.  They recommend minced and moist diet with mildly thick liquid by spoon, one-to-one feeding assistance while in hospital.  There was no aspiration during swallow study however patient has significant swallow delay and is at risk for aspiration.    Suspected acute exacerbation of chronic heart failure with preserved EF  Hypertension  Hyperlipidemia  -CXR in the ED showed cardiomegaly.  BNP  elevated and rhonchi on exam. Last echo from 5/16/21 showed a normal EF of 60-65% with moderate to severe concentric LVH but no RWMA or significant valvular disease    -Telemetry for 24 hours  -Strict I/Os and daily weights  -Patient has a known history of diastolic congestive heart failure  -Continue Lasix 60 mg IV twice a day for today  -Likely will resume prior to admission regimen of 30 mg daily starting tomorrow  -Continue prior to admission metoprolol    Recent aspiration vs healthcare associated pneumonia  -Patient was just discharged from the hospital for this on 4/9.  While here he was treated with IV Zosyn and Azithromycin and discharged on Augmentin.    -Switched back to IV Zosyn as patient is was initially placed on BiPAP  -Continue Zosyn to complete his PTA course(1 day more)     DM type II with neuropathy   HgbA1c 6.9 during last hospitalization.  Does not appear to be on any medications for this   -High intensity sliding scale given concurrent use of steroids     Stable chronic medical issues     H/o CVA - persistent left sided deficits   Dysphagia   BPH w/chronic indwelling chirinos catheter       Diet: Combination Diet Minced and Moist Diet (level 5); Mildly Thick (level 2) (by spoon)     DVT Prophylaxis: Pneumatic Compression Devices   Chirinos Catheter: PRESENT, indication: Retention (chronic chirinos)  Code Status: No CPR- Pre-arrest intubation OK     Disposition Plan       Expected Discharge Date: 04/13/2023              Entered: Romulo Cavanaugh MD 04/12/2023, 12:03 PM        Clinically Significant Risk Factors        # Hypokalemia: Lowest K = 3.3 mmol/L in last 2 days, will replace as needed       # Hypoalbuminemia: Lowest albumin = 3 g/dL at 4/10/2023 12:43 PM, will monitor as appropriate           # DMII: A1C = 6.9 % (Ref range: <5.7 %) within past 6 months, PRESENT ON ADMISSION  # Obesity: Estimated body mass index is 32.41 kg/m  as calculated from the following:    Height as of this encounter:  1.829 m (6').    Weight as of this encounter: 108.4 kg (238 lb 15.7 oz)., PRESENT ON ADMISSION            The patient's care was discussed with the Bedside Nurse and Patient.    Medical Decision Making       **CLEAR ALL SELECTIONS**        Labs/Imaging Reviewed:  See Information above and Data section below    Time SPENT BY ME on the date of service doing chart review, history, exam, documentation & further activities per the note:  35 MINUTES    Romulo Cavanaugh MD  Hospitalist Service  [facility]  Text Page 7AM-6PM  Securely message with the [employer] Web Console (learn more here)  Text page via [employer] Paging/Directory    ______________________________________________________________________    Interval History   Overall feels much better.  Oxygenation almost back to normal at 2-3 L.  Denies cough.  Afebrile.    Data reviewed today: I reviewed all medications, new labs and imaging results over the last 24 hours. I personally reviewed no images or EKG's today.    Physical Exam   Vital signs:  Temp: 97.5  F (36.4  C) Temp src: Axillary BP: (!) 141/88 Pulse: 83   Resp: 16 SpO2: 96 % O2 Device: Nasal cannula Oxygen Delivery: 4 LPM Height: 182.9 cm (6') Weight: 108.4 kg (238 lb 15.7 oz)  Estimated body mass index is 32.41 kg/m  as calculated from the following:    Height as of this encounter: 1.829 m (6').    Weight as of this encounter: 108.4 kg (238 lb 15.7 oz).      Wt Readings from Last 2 Encounters:   04/12/23 108.4 kg (238 lb 15.7 oz)   04/06/23 111.6 kg (246 lb 1.6 oz)       Gen: AAOX2-3, NAD, comfortable  Resp: Diminished air entry at both the bases, normal effort of breathing  CVS: RRR, no murmur  Abd/GI: Soft, non-tender. BS- normoactive.    Skin: Warm, dry no rashes  MSK: Trace pedal edema  Neuro- CN- intact.       Data   Recent Labs   Lab 04/12/23  0545 04/12/23  0208 04/11/23  1431 04/11/23  1305 04/10/23  1727 04/10/23  1243 04/09/23  0811 04/09/23  0622 04/06/23  1104  04/06/23  0514   WBC  --   --   --   --   --  9.4  --   --   --  9.1   HGB  --   --   --   --   --  12.5*  --   --   --  11.1*   MCV  --   --   --   --   --  100  --   --   --  101*   PLT  --   --   --  245  --  247  --  191  --  154     --   --  140  --  139  --   --   --  142   POTASSIUM 3.3*  --   --  3.6  --  3.7  --   --    < > 3.7   CHLORIDE 98  --   --  99  --  101  --   --   --  105   CO2 31*  --   --  29  --  28  --   --   --  29   BUN 26.2*  --   --  20.3  --  13.7  --   --   --  22.9   CR 0.96  --   --  0.89  --  0.80  --  0.82  --  0.89   ANIONGAP 12  --   --  12  --  10  --   --   --  8   RAJ 9.1  --   --  8.9  --  8.7*  --   --   --  8.7*   *  136* 142*   < > 172*   < > 151*   < >  --    < > 128*   ALBUMIN  --   --   --   --   --  3.0*  --   --   --   --    PROTTOTAL  --   --   --   --   --  7.2  --   --   --   --    BILITOTAL  --   --   --   --   --  0.4  --   --   --   --    ALKPHOS  --   --   --   --   --  74  --   --   --   --    ALT  --   --   --   --   --  21  --   --   --   --    AST  --   --   --   --   --  22  --   --   --   --     < > = values in this interval not displayed.       Recent Results (from the past 24 hour(s))   XR Video Swallow with SLP or OT    Narrative    VIDEO SWALLOWING EVALUATION   4/12/2023 10:10 AM     HISTORY: Dysphagia, aspiration history.    COMPARISON: None.    FLUOROSCOPY TIME: 2.0 minutes     Number of cine runs: 13    FINDINGS:    Thin: Premature spill to the piriforms. One episode of moderate flash  penetration. Otherwise normal.    Slightly thick: Premature spill to the piriforms. Otherwise normal.    Mildly thick: Premature spill to the piriforms. Otherwise normal.    Moderately thick: Not administered.    Puree: Normal.    Semisolid: Premature spill to the vallecula. Otherwise normal.    Regular: Not administered.    This study only includes the cervical esophagus.    RUBY CARBONE MD         SYSTEM ID:  [id number]     Medications     - MEDICATION  INSTRUCTIONS -       - MEDICATION INSTRUCTIONS -         allopurinol  300 mg Oral QAM     aspirin  81 mg Oral Daily     atorvastatin  10 mg Oral QAM     enoxaparin ANTICOAGULANT  40 mg Subcutaneous Q24H     insulin aspart  1-12 Units Subcutaneous Q4H     methylPREDNISolone  62.5 mg Intravenous Q24H     metoprolol tartrate  12.5 mg Oral BID     pantoprazole  40 mg Oral QAM AC     PARoxetine  10 mg Oral QAM     PARoxetine  40 mg Oral QAM     piperacillin-tazobactam  3.375 g Intravenous Q6H     potassium chloride  40 mEq Oral Once     sodium chloride (PF)  3 mL Intracatheter Q8H     sodium chloride (PF)  3 mL Intracatheter Q8H     sodium chloride bacteriostatic  10 mL Intravenous Once     umeclidinium-vilanterol  1 puff Inhalation Daily

## 2023-04-12 NOTE — PROGRESS NOTES
CLINICAL AND VIDEO SWALLOW STUDY       04/12/23 0916   Appointment Info   Signing Clinician's Name / Credentials (SLP) Sigrid Griffin M.A., CCC-SLP, BCS-S   General Information   Onset of Illness/Injury or Date of Surgery 04/10/23   Referring Physician Dr. Cavanaugh   Patient/Family Therapy Goal Statement (SLP) Patient would like to drink Gatorade, likes ice chips and wants to return to baseline oral intake.  Dentures are missing.   Pertinent History of Current Problem The pt is an 82 year old male with PMH of COPD on chronic oxygen, hx CVA, chronic indwelling chirinos catheter, DM2, HTN, HLD, BPH, depression, neuropathy, spinal stenosis, hx GIB, wheechair bound, who was admitted for acute hypoxic respiratory failure and pneumonia, possibly aspiration last week, and now readmitted with recurrent concern for aspiration pneumonia resulting in hypoxic respiratory failure.  His last video swallow study was 3/2021, indicating aspiration risks with mildly thick and thin liquids at that time.   Pain Assessment   Patient Currently in Pain No   Type of Evaluation   Type of Evaluation Swallow Evaluation   Oral Motor   Oral Musculature anomalies present   Structural Abnormalities none present   Mucosal Quality dry   Dentition (Oral Motor)   Dentition (Oral Motor) edentulous;dental appliance/dentures   Comment, Dentition (Oral Motor) Missing dentures currently, edentulous for exam   Facial Symmetry (Oral Motor)   Facial Symmetry (Oral Motor) left side impairment   Left Side Facial Asymmetry minimal impairment  (baseline)   Lip Function (Oral Motor)   Lip Range of Motion (Oral Motor) retraction impairment   Retraction, Lip Range of Motion minimal impairment;left side   Lip Coordination (Oral Motor) left side;minimal impairment   Tongue Function (Oral Motor)   Tongue ROM (Oral Motor) lateralization is impaired   Lateralization, Tongue ROM Impairment (Oral Motor) left side;minimal impairment   Tongue Coordination/Speed (Oral Motor)  reduced rate   Jaw Function (Oral Motor)   Jaw Function (Oral Motor) WNL   Cough/Swallow/Gag Reflex (Oral Motor)   Volitional Throat Clear/Cough (Oral Motor) WNL   Volitional Swallow (Oral Motor) mildly delayed   Vocal Quality/Secretion Management (Oral Motor)   Vocal Quality (Oral Motor) breathy;phonation breaks;hypophonic   Comment, Vocal Quality/Secretion Management (Oral Motor) Patient reports voice quality worsened, aphonia reported after aspiration of nebulized chemical med.   General Swallowing Observations   Current Diet/Method of Nutritional Intake (General Swallowing Observations, NIS) pureed (level 4);mildly thick liquids (level 2)   Respiratory Support (General Swallowing Observations) nasal cannula   Past History of Dysphagia Significant dysphagia hx since CVA (left sided residual deficits).   Swallowing Evaluation Clinical swallow evaluation;Videofluoroscopic swallow study (VFSS)   Clinical Swallow Evaluation   Feeding Assistance frequent cues/help required  (d/t chronic left-sided weakness)   Adaptive Eating Utensils Uses pop bottle drinking at home with all liquids, feeding self.   Additional evaluation(s) completed today Yes;Recommended   Rationale for completing additional evaluation Video swallow study indicated to assess silent aspiration risk, oropharyngeal and cervical swallow function   VFSS Evaluation   Radiologist Dr. Sanchez   Views Taken left lateral;A/P   VFSS Textures Trialed thin liquids;slightly thick liquids;mildly thick liquids;moderately thick liquids/liquidized;pureed;minced & moist;solid foods   VFSS Eval: Thin Liquid Texture Trial   Mode of Presentation, Thin Liquid spoon;fed by clinician   Order of Presentation 8, 9, 10, 13   Preparatory Phase anterior loss of bolus;impaired labial seal;poor bolus control   Oral Phase, Thin Liquid premature pharyngeal entry   Bolus Location When Swallow Triggered pyriforms   Pharyngeal Phase, Thin Liquid impaired hyolaryngeal excursion;impaired  tongue base retraction   Rosenbek's Penetration Aspiration Scale: Thin Liquid Trial Results 3 - contrast remains above the vocal cords, visible residue remains (penetration)   Strategies and Compensations chin tuck   Diagnostic Statement Trace residue remaining in vestibule after penetration of larger bolus by spoon.  Chin tuck resulting in oral-labial leakage and did not prevent premature pharyngeal entry.   VFSS Eval: Slightly Thick Liquids   Mode of Presentation spoon;fed by clinician   Order of Presentation 5, 6, 7, 14   Preparatory Phase anterior loss of bolus;impaired labial seal   Oral Phase impaired AP movement;premature pharyngeal entry   Bolus Location When Swallow Triggered pyriforms   Pharyngeal Phase impaired hyolaryngel excursion;impaired tongue base retraction   Rosenbek's Penetration Aspiration Scale 1 - no aspiration, contrast does not enter airway   Diagnostic Statement Sips by spoon are prematurely moved to pyriform sinus, at risk of overflow to airway with larger than teaspoon size   VFSS Eval: Mildly Thick Liquids   Mode of Presentation spoon;cup;self-fed;fed by clinician   Order of Presentation 1, 2, 3, 4   Preparatory Phase poor bolus control   Oral Phase impaired AP movement;premature pharyngeal entry   Bolus Location When Swallow Triggered pyriforms   Pharyngeal Phase impaired hyolaryngel excursion;impaired tongue base retraction   Rosenbek's Penetration Aspiration Scale 1 - no aspiration, contrast does not enter airway   Diagnostic Statement Sips by spoon are prematurely moved to pyriform sinus, at risk of overflow to airway with larger than teaspoon size   VFSS Evaluation: Puree Solid Texture Trial   Mode of Presentation, Puree spoon;fed by clinician   Order of Presentation 11   Preparatory Phase prolonged bolus preparation;poor bolus control   Oral Phase, Puree impaired AP movement;premature pharyngeal entry   Bolus Location When Swallow Triggered valleculae   Pharyngeal Phase, Puree WFL    Rosenbek's Penetration Aspiration Scale: Puree Food Trial Results 1 - no aspiration, contrast does not enter airway   Diagnostic Statement No pharyngeal residue, but premature entry to valleculae.   VFSS Eval: Minced & Moist    Mode of Presentation spoon;fed by clinician   Order of Presentation 12   Preparatory Phase insufficient mastication;prolonged bolus preparation   Oral Phase impaired AP movement;premature pharyngeal entry   Bolus Location When Swallow Triggered valleculae   Pharyngeal Phase WFL   Rosenbek's Penetration Aspiration Scale 1 - no aspiration, contrast does not enter airway   Diagnostic Statement Edentulous for VFSS: No pharyngeal residue, but premature entry to valleculae.   VFSS Evaluation: Solid Food Texture Trial   Diagnostic Statement Not trialed due to edentulous status for eval   Esophageal Phase of Swallow   Patient reports or presents with symptoms of esophageal dysphagia No   Swallowing Recommendations   Diet Consistency Recommendations mildly thick liquids (level 2);minced & moist (level 5);free water protocol  (Reassess if dentures obtained, FWP appropriate with assist)   Supervision Level for Intake close supervision needed   Mode of Delivery Recommendations slow rate of intake;no straws;liquids via spoon only   Swallowing Maneuver Recommendations alternate food and liquid intake   Monitoring/Assistance Required (Eating/Swallowing) monitor for cough or change in vocal quality with intake   Recommended Feeding/Eating Techniques (Swallow Eval) maintain upright sitting position for eating;encourage use of dentures;provide assist with feeding   Medication Administration Recommendations, Swallowing (SLP) Whole with thick liquids or puree carrier   General Therapy Interventions   Planned Therapy Interventions Dysphagia Treatment   Dysphagia treatment Modified diet education;Instruction of safe swallow strategies   Clinical Impression   Criteria for Skilled Therapeutic Interventions Met  (SLP Eval) Yes, treatment indicated   SLP Diagnosis Moderate oropharyngeal dysphagia   Risks & Benefits of therapy have been explained evaluation/treatment results reviewed;risks/benefits reviewed;patient   SLP Total Evaluation Time   Eval: oral/pharyngeal swallow function, clinical swallow Minutes (19511) 10   Evaluation, videofluoroscopic eval of swallow function Minutes (64228) 25   SLP Goals   SLP Predicted Duration/Target Date for Goal Attainment 04/26/23   SLP Goals Swallow   SLP: Safely tolerate diet without signs/symptoms of aspiration With use of compensatory swallow strategies;With assistance/supervision;With use of swallow precautions   Interventions   Interventions Quick Adds Swallowing Dysfunction   Swallowing Dysfunction &/or Oral Function for Feeding   Treatment of Swallowing Dysfunction &/or Oral Function for Feeding Minutes (86989) 15   Symptoms Noted During/After Treatment None   Treatment Detail/Skilled Intervention Patient trained re: effective safe swallowing strategies as trialed during video swallow study. Patient verbalized comprehension of safety recommendation for liquids by spoon while in hospital, ensure head in neutral position when transitioning to drinking thick liquids by pop bottle at home.   SLP Discharge Planning   SLP Plan Meal follow up, trial SB6 textures if dentures obtained, strategies for liquids by spoon, FWP (ice chips)   SLP Discharge Recommendation Long term care facility   SLP Rationale for DC Rec Below baseline swallow function, recommend SLP services for transition to least restrictive diet when appropriate.   SLP Brief overview of current status  Clinical and video swallow study completed: recommend minced and moist diet with mildly thick liquids by spoon, 1:1 feeding assistance while in hospital.  Patient reporting sudden onset of worsened voice, throat pain, and swallowing difficulty associated with nebulizer treatment when liquid medication dripped into his mouth  and was possibly aspirated prior to his previous admission.  There was no aspiration during swallow study today, however, with significant swallow delay, drinking liquids in large quantity with head back position (drinking from water/pop bottle at home) may pose aspiration risk with spillover from pyriform sinus.   Total Session Time   Total Session Time (sum of timed and untimed services) 50

## 2023-04-12 NOTE — PLAN OF CARE
Precautions: Contact VRE    Cognitive/Mentation: A/Ox 4 forgetful. L sided weakness from previous stroke  VS: Stable. Tele: SR w/ BBB and PVCs.  GI: BS +, + flatus. Incontinent.  : Chronic chirinos.  Pulmonary: LS diminished, on 3L NC.  Pain: Denies.     IVs: PIV infusing TKO fluids  Drains: None  Skin: PI to coccyx  Activity: Assist x 2 with lift.  Diet: Minced and moist with mildly thickened liquids. Takes pills crushed in applesauce.     Aggression Stoplight Score: Green  Therapies recs: Return to facility with home care  Discharge: Pending possibly tomorrow    End of shift summary: Insulin given for meal time BG. Turned and repositioned.

## 2023-04-12 NOTE — CONSULTS
Care Management Initial Consult    General Information  Assessment completed with: Care Team Member,    Type of CM/SW Visit: Initial Assessment    Primary Care Provider verified and updated as needed:     Readmission within the last 30 days:        Reason for Consult: discharge planning  Advance Care Planning:            Communication Assessment  Patient's communication style: spoken language (English or Bilingual)    Hearing Difficulty or Deaf: no   Wear Glasses or Blind: no    Cognitive  Cognitive/Neuro/Behavioral: .WDL except, orientation  Level of Consciousness: alert, intermittent confusion  Arousal Level: opens eyes spontaneously  Orientation: oriented x 4  Mood/Behavior: calm, cooperative  Best Language: 0 - No aphasia  Speech: logical, spontaneous, hoarse, slow    Living Environment:   People in home: facility resident     Current living Arrangements: assisted living  Name of Facility: [facility]   Able to return to prior arrangements: yes       Family/Social Support:  Care provided by: other (see comments), homecare agency  Provides care for: no one, unable/limited ability to care for self  Marital Status:   Wife, Facility resident(s)/Staff          Description of Support System: Involved    Support Assessment: Adequate family and caregiver support    Current Resources:   Patient receiving home care services: Yes  Skilled Home Care Services: Skilled Nursing  Community Resources:    Equipment currently used at home: wheelchair, power, lift device  Supplies currently used at home:      Employment/Financial:  Employment Status: retired        Financial Concerns: No concerns identified   Referral to Financial Worker: No       Lifestyle & Psychosocial Needs:  Social Determinants of Health     Tobacco Use: Medium Risk (9/26/2022)    Patient History      Smoking Tobacco Use: Former      Smokeless Tobacco Use: Never      Passive Exposure: Not on file   Alcohol Use: Not on file   Financial Resource  "Strain: Not on file   Food Insecurity: Not on file   Transportation Needs: Not on file   Physical Activity: Not on file   Stress: Not on file   Social Connections: Not on file   Intimate Partner Violence: Not on file   Depression: Not on file   Housing Stability: Not on file       Functional Status:  Prior to admission patient needed assistance:              Mental Health Status:  Mental Health Status: No Current Concerns       Chemical Dependency Status:  Chemical Dependency Status: No Current Concerns             Values/Beliefs:  Spiritual, Cultural Beliefs, Denominational Practices, Values that affect care: no               Additional Information:  SW reviewed chart. Pt discharged from [facility] 4/9 and re-admitted 4/10. Pt admitted from [facility]. He lives in the [facility]. Spoke with the RN, Shireen, [phone]. Pt uses a [facility] list for transfers and is \"total care.\" He has weekly bathing, meals, laundry, medication administration, hourly safety checks, a chirinos catheter and a call light. Pt has been using [facility] for catheter care. His RN with [facility] is Muna, [phone], per Muna, [facility] will no longer be servicing pt as they do not feel [facility] is an appropriate level of care. Pt will need a new home care agency upon discharge. Referrals placed via the [facility]. His [facility] CC is Sri, [phone]. Shireen has stated pt can return when ready. SW following for discharge planning.     ARMEN Diane, LICSW  [phone] Desk phone  [phone] Cell/text (Preferred)  [facility]          "

## 2023-04-13 ENCOUNTER — APPOINTMENT (OUTPATIENT)
Dept: SPEECH THERAPY | Facility: CLINIC | Age: 83
DRG: 177 | End: 2023-04-13
Payer: MEDICARE

## 2023-04-13 LAB
GLUCOSE BLDC GLUCOMTR-MCNC: 107 MG/DL (ref 70–99)
GLUCOSE BLDC GLUCOMTR-MCNC: 110 MG/DL (ref 70–99)
GLUCOSE BLDC GLUCOMTR-MCNC: 136 MG/DL (ref 70–99)
GLUCOSE BLDC GLUCOMTR-MCNC: 140 MG/DL (ref 70–99)
GLUCOSE BLDC GLUCOMTR-MCNC: 167 MG/DL (ref 70–99)
GLUCOSE BLDC GLUCOMTR-MCNC: 183 MG/DL (ref 70–99)
POTASSIUM SERPL-SCNC: 4.2 MMOL/L (ref 3.4–5.3)

## 2023-04-13 PROCEDURE — 999N000157 HC STATISTIC RCP TIME EA 10 MIN

## 2023-04-13 PROCEDURE — 250N000011 HC RX IP 250 OP 636: Performed by: INTERNAL MEDICINE

## 2023-04-13 PROCEDURE — 999N000032 HC STATISTIC CHRONIC DISEASE SPECIALIST RT CONSULT

## 2023-04-13 PROCEDURE — 99232 SBSQ HOSP IP/OBS MODERATE 35: CPT | Performed by: INTERNAL MEDICINE

## 2023-04-13 PROCEDURE — 92526 ORAL FUNCTION THERAPY: CPT | Mod: GN | Performed by: SPEECH-LANGUAGE PATHOLOGIST

## 2023-04-13 PROCEDURE — 36415 COLL VENOUS BLD VENIPUNCTURE: CPT | Performed by: INTERNAL MEDICINE

## 2023-04-13 PROCEDURE — 250N000013 HC RX MED GY IP 250 OP 250 PS 637: Performed by: INTERNAL MEDICINE

## 2023-04-13 PROCEDURE — G0463 HOSPITAL OUTPT CLINIC VISIT: HCPCS

## 2023-04-13 PROCEDURE — 84132 ASSAY OF SERUM POTASSIUM: CPT | Performed by: INTERNAL MEDICINE

## 2023-04-13 PROCEDURE — 120N000001 HC R&B MED SURG/OB

## 2023-04-13 PROCEDURE — 999N000033 HC STATISTIC CHRONIC PULMONARY DISEASE SPECIALIST

## 2023-04-13 RX ORDER — MULTIPLE VITAMINS W/ MINERALS TAB 9MG-400MCG
1 TAB ORAL DAILY
Status: DISCONTINUED | OUTPATIENT
Start: 2023-04-13 | End: 2023-04-14 | Stop reason: HOSPADM

## 2023-04-13 RX ORDER — PREDNISONE 20 MG/1
40 TABLET ORAL DAILY
Status: DISCONTINUED | OUTPATIENT
Start: 2023-04-14 | End: 2023-04-14 | Stop reason: HOSPADM

## 2023-04-13 RX ADMIN — PAROXETINE HYDROCHLORIDE 10 MG: 10 TABLET, FILM COATED ORAL at 09:59

## 2023-04-13 RX ADMIN — PAROXETINE 40 MG: 40 TABLET, FILM COATED ORAL at 09:59

## 2023-04-13 RX ADMIN — METOPROLOL TARTRATE 12.5 MG: 25 TABLET, FILM COATED ORAL at 20:55

## 2023-04-13 RX ADMIN — MULTIPLE VITAMINS W/ MINERALS TAB 1 TABLET: TAB at 16:40

## 2023-04-13 RX ADMIN — ALLOPURINOL 300 MG: 300 TABLET ORAL at 09:59

## 2023-04-13 RX ADMIN — ASPIRIN 81 MG: 81 TABLET, COATED ORAL at 10:00

## 2023-04-13 RX ADMIN — PANTOPRAZOLE SODIUM 40 MG: 40 TABLET, DELAYED RELEASE ORAL at 09:59

## 2023-04-13 RX ADMIN — ATORVASTATIN CALCIUM 10 MG: 10 TABLET, FILM COATED ORAL at 10:00

## 2023-04-13 RX ADMIN — FUROSEMIDE 30 MG: 20 TABLET ORAL at 12:57

## 2023-04-13 RX ADMIN — UMECLIDINIUM BROMIDE AND VILANTEROL TRIFENATATE 1 PUFF: 62.5; 25 POWDER RESPIRATORY (INHALATION) at 10:09

## 2023-04-13 RX ADMIN — METHYLPREDNISOLONE SODIUM SUCCINATE 62.5 MG: 125 INJECTION, POWDER, FOR SOLUTION INTRAMUSCULAR; INTRAVENOUS at 10:05

## 2023-04-13 RX ADMIN — ENOXAPARIN SODIUM 40 MG: 40 INJECTION SUBCUTANEOUS at 18:24

## 2023-04-13 RX ADMIN — METOPROLOL TARTRATE 12.5 MG: 25 TABLET, FILM COATED ORAL at 09:59

## 2023-04-13 ASSESSMENT — ACTIVITIES OF DAILY LIVING (ADL)
ADLS_ACUITY_SCORE: 69

## 2023-04-13 NOTE — CONSULTS
Discharge Pharmacy Test Claim    Patient's Medicare Part B plan and [employer] will cover brovana and peforomist as long as patient meets Medicare Part B's COPD criteria.    Test Claim Copay   brovana 0.00   peroformist 0.00       Juli Gamino  [facility] Pharmacy Liaison  Ph: [phone] Pager: [phone]   Securely message with the Vocera Web Console (learn more here)

## 2023-04-13 NOTE — PROGRESS NOTES
[facility]    Medicine Progress Note - Hospitalist Service        Date of Admission:  4/10/2023 12:37 PM    Assessment & Plan:   Ron Gilmore is a 82 year old male who was actually just hospitalized from 4/3-4/9 for acute hypoxic respiratory failure thought secondary to possible aspiration vs healthcare associated pneumonia and discharged back to his [facility] on 4/9.  He returned to our ED on 4/10/2023 due to worsening respiratory failure     Acute hypoxic respiratory failure   Suspected aspiration  Possible COPD acute exacerbation   -Patient was at his [facility] this morning when they noted the patient to have increased work of breathing and hypoxic on his baseline 2 L.  Patient was satting in to the 80s on 2 L.  Initially in the ED he was on 15 L and subsequently had to be placed on BiPAP.  On exam in the ED patient had rhonchi.  BNP is elevated at 2,833.  Troponin minimally elevated at 35.  Procalcitonin 0.08. Initial VBG was 7.36/55/36 but improved with BiPAP.  CXR showed low lung volume, emphysema, mild cardiomegaly but no other obvious edema.    -Initially on BiPAP, weaned BiPAP to nasal cannula at 2-3 L  -Discontinue Solu-Medrol, start prednisone 40 mg p.o. daily  -Completed PTA course of antibiotics on 4/12.  -Respiratory therapist consult to optimize inhalers prior to discharge    Dysphagia  -Patient has known dysphagia and I suspect acute respiratory decompensation yesterday was most likely related to another silent aspiration event  -SLP following, patient underwent cervical and video swallow evaluation today.  They recommend minced and moist diet with mildly thick liquid by spoon, one-to-one feeding assistance while in hospital.  There was no aspiration during swallow study however patient has significant swallow delay and is at risk for aspiration.    Suspected acute exacerbation of chronic heart failure with preserved EF  Hypertension  Hyperlipidemia  -CXR in the ED showed cardiomegaly.  BNP  elevated and rhonchi on exam. Last echo from 5/16/21 showed a normal EF of 60-65% with moderate to severe concentric LVH but no RWMA or significant valvular disease    -Telemetry for 24 hours  -Strict I/Os and daily weights  -Patient has a known history of diastolic congestive heart failure  -Initially received few doses of IV Lasix  -Resume PTA dose of Lasix 30 mg p.o. daily today  -Continue prior to admission metoprolol    Recent aspiration vs healthcare associated pneumonia  -Patient was just discharged from the hospital for this on 4/9.  While here he was treated with IV Zosyn and Azithromycin and discharged on Augmentin.    -Completed antibiotic course yesterday.     DM type II with neuropathy   HgbA1c 6.9 during last hospitalization.  Does not appear to be on any medications for this   -High intensity sliding scale given concurrent use of steroids     Stable chronic medical issues     H/o CVA - persistent left sided deficits   Dysphagia   BPH w/chronic indwelling chirinos catheter       Diet: Combination Diet Minced and Moist Diet (level 5); Mildly Thick (level 2) (by spoon)     DVT Prophylaxis: Pneumatic Compression Devices   Chirinos Catheter: PRESENT, indication: Retention  Code Status: No CPR- Pre-arrest intubation OK     Disposition Plan       Expected Discharge Date: 04/14/2023              Entered: Romulo Cavanaugh MD 04/13/2023, 9:23 AM        Clinically Significant Risk Factors        # Hypokalemia: Lowest K = 3.3 mmol/L in last 2 days, will replace as needed       # Hypoalbuminemia: Lowest albumin = 3 g/dL at 4/10/2023 12:43 PM, will monitor as appropriate           # DMII: A1C = 6.9 % (Ref range: <5.7 %) within past 6 months, PRESENT ON ADMISSION  # Obesity: Estimated body mass index is 30.68 kg/m  as calculated from the following:    Height as of this encounter: 1.829 m (6').    Weight as of this encounter: 102.6 kg (226 lb 3.1 oz)., PRESENT ON ADMISSION            The patient's care was discussed with  the Bedside Nurse and Patient.    Medical Decision Making       **CLEAR ALL SELECTIONS**        Labs/Imaging Reviewed:  See Information above and Data section below    Time SPENT BY ME on the date of service doing chart review, history, exam, documentation & further activities per the note:  35 MINUTES    Romulo Cavanaugh MD  Hospitalist Service  [facility]  Text Page 7AM-6PM  Securely message with the Vocera Web Console (learn more here)  Text page via [employer] Paging/Directory    ______________________________________________________________________    Interval History   Overall feels better.  Respiratory status stable.  No nausea or vomiting.  Tolerating oral intake okay so far.    Data reviewed today: I reviewed all medications, new labs and imaging results over the last 24 hours. I personally reviewed no images or EKG's today.    Physical Exam   Vital signs:  Temp: 97.9  F (36.6  C) Temp src: Oral BP: (!) 141/93 Pulse: 51   Resp: 13 SpO2: 100 % O2 Device: Nasal cannula Oxygen Delivery: 5 LPM Height: 182.9 cm (6') Weight: 102.6 kg (226 lb 3.1 oz)  Estimated body mass index is 30.68 kg/m  as calculated from the following:    Height as of this encounter: 1.829 m (6').    Weight as of this encounter: 102.6 kg (226 lb 3.1 oz).      Wt Readings from Last 2 Encounters:   04/13/23 102.6 kg (226 lb 3.1 oz)   04/06/23 111.6 kg (246 lb 1.6 oz)       Gen: AAOX2-3, NAD, comfortable  Resp: Diminished air entry at both the bases, normal effort of breathing  CVS: RRR, no murmur  Abd/GI: Soft, non-tender. BS- normoactive.    Skin: Warm, dry no rashes  MSK: Trace pedal edema  Neuro- CN- intact.       Data   Recent Labs   Lab 04/13/23  0721 04/13/23  0607 04/13/23  0215 04/12/23  2109 04/12/23  1807 04/12/23  1542 04/12/23  1211 04/12/23  0545 04/11/23  1431 04/11/23  1305 04/10/23  1727 04/10/23  1243 04/09/23  0811 04/09/23  0622   WBC  --   --   --   --   --   --   --   --   --   --   --  9.4  --   --     HGB  --   --   --   --   --   --   --   --   --   --   --  12.5*  --   --    MCV  --   --   --   --   --   --   --   --   --   --   --  100  --   --    PLT  --   --   --   --   --   --   --   --   --  245  --  247  --  191   NA  --   --   --   --   --   --   --  141  --  140  --  139  --   --    POTASSIUM 4.2  --   --   --   --  3.8  --  3.3*  --  3.6  --  3.7  --   --    CHLORIDE  --   --   --   --   --   --   --  98  --  99  --  101  --   --    CO2  --   --   --   --   --   --   --  31*  --  29  --  28  --   --    BUN  --   --   --   --   --   --   --  26.2*  --  20.3  --  13.7  --   --    CR  --   --   --   --   --   --   --  0.96  --  0.89  --  0.80  --  0.82   ANIONGAP  --   --   --   --   --   --   --  12  --  12  --  10  --   --    RAJ  --   --   --   --   --   --   --  9.1  --  8.9  --  8.7*  --   --    GLC  --  140* 136* 190*   < >  --    < > 132*  136*   < > 172*   < > 151*   < >  --    ALBUMIN  --   --   --   --   --   --   --   --   --   --   --  3.0*  --   --    PROTTOTAL  --   --   --   --   --   --   --   --   --   --   --  7.2  --   --    BILITOTAL  --   --   --   --   --   --   --   --   --   --   --  0.4  --   --    ALKPHOS  --   --   --   --   --   --   --   --   --   --   --  74  --   --    ALT  --   --   --   --   --   --   --   --   --   --   --  21  --   --    AST  --   --   --   --   --   --   --   --   --   --   --  22  --   --     < > = values in this interval not displayed.       Recent Results (from the past 24 hour(s))   XR Video Swallow with SLP or OT    Narrative    VIDEO SWALLOWING EVALUATION   4/12/2023 10:10 AM     HISTORY: Dysphagia, aspiration history.    COMPARISON: None.    FLUOROSCOPY TIME: 2.0 minutes     Number of cine runs: 13    FINDINGS:    Thin: Premature spill to the piriforms. One episode of moderate flash  penetration. Otherwise normal.    Slightly thick: Premature spill to the piriforms. Otherwise normal.    Mildly thick: Premature spill to the piriforms. Otherwise  normal.    Moderately thick: Not administered.    Puree: Normal.    Semisolid: Premature spill to the vallecula. Otherwise normal.    Regular: Not administered.    This study only includes the cervical esophagus.    RUBY CARBONE MD         SYSTEM ID:  [id number]   Echocardiogram Complete   Result Value    LVEF  55-60%    Narrative    [id number]  [id number]  [id number]  [id number]^EMORY^HOOD^CONNOR     [facility]  Echocardiography Laboratory  [facility] [place] [address]     Name: SHERI THORPE  MRN: [id number]  : 1940  Study Date: 2023 11:27 AM  Age: 82 yrs  Gender: Male  Patient Location: [facility]  Reason For Study: CHF  Ordering Physician: HOOD CAT  Referring Physician: HOOD CAT  Performed By: No Adnrade     BSA: 2.3 m2  Height: 72 in  Weight: 245 lb  HR: 92  ______________________________________________________________________________  Procedure  Complete Echo Adult. Optison (NDC #[id number]) given intravenously.  ______________________________________________________________________________  Interpretation Summary     The left ventricle is normal in size.  The visual ejection fraction is 55-60%.  No regional wall motion abnormalities noted.  The right ventricle is moderately dilated.  Mildly decreased right ventricular systolic function  Mild aortic root dilatation.  No significant valvular heart disease.  ______________________________________________________________________________  Left Ventricle  The left ventricle is normal in size. There is normal left ventricular wall  thickness. The visual ejection fraction is 55-60%. No regional wall motion  abnormalities noted.     Right Ventricle  The right ventricle is moderately dilated. Mildly decreased right ventricular  systolic function.     Atria  Normal left atrial size. Right atrial size is normal. There is no color  Doppler evidence of an atrial shunt.     Mitral Valve  The mitral valve  leaflets are mildly thickened. There is mild mitral annular  calcification. There is trace mitral regurgitation.     Tricuspid Valve  There is trace tricuspid regurgitation.     Aortic Valve  The aortic valve is not well visualized. No aortic regurgitation is present.     Pulmonic Valve  There is trace pulmonic valvular regurgitation.     Vessels  Mild aortic root dilatation. The ascending aorta is Borderline dilated.     Pericardium  There is no pericardial effusion.     Rhythm  Sinus rhythm was noted.  ______________________________________________________________________________  MMode/2D Measurements & Calculations     IVSd: 1.1 cm  LVIDd: 4.8 cm  LVIDs: 3.5 cm  LVPWd: 0.90 cm  FS: 26.4 %  LV mass(C)d: 175.0 grams  LV mass(C)dI: 75.3 grams/m2  Ao root diam: 4.2 cm  asc Aorta Diam: 3.7 cm  LVOT diam: 2.2 cm  LVOT area: 3.9 cm2  RWT: 0.37     Doppler Measurements & Calculations  MV E max kaushik: 49.3 cm/sec  MV A max kaushik: 85.7 cm/sec  MV E/A: 0.58  Ao V2 max: 84.3 cm/sec  Ao max PG: 3.0 mmHg  FLORA(V,D): 2.8 cm2  LV V1 max P.4 mmHg  LV V1 max: 60.0 cm/sec  LV V1 VTI: 13.1 cm  SV(LVOT): 50.6 ml  SI(LVOT): 21.8 ml/m2  PA V2 max: 76.0 cm/sec  PA max P.3 mmHg  PA acc time: 0.10 sec  AV Kaushik Ratio (DI): 0.71     ______________________________________________________________________________  Report approved by: Maynor Gardiner 2023 03:23 PM           Medications     - MEDICATION INSTRUCTIONS -       - MEDICATION INSTRUCTIONS -         allopurinol  300 mg Oral QAM     aspirin  81 mg Oral Daily     atorvastatin  10 mg Oral QAM     enoxaparin ANTICOAGULANT  40 mg Subcutaneous Q24H     insulin aspart  1-12 Units Subcutaneous Q4H     methylPREDNISolone  62.5 mg Intravenous Q24H     metoprolol tartrate  12.5 mg Oral BID     pantoprazole  40 mg Oral QAM AC     PARoxetine  10 mg Oral QAM     PARoxetine  40 mg Oral QAM     sodium chloride (PF)  3 mL Intracatheter Q8H     sodium chloride bacteriostatic  10 mL  Intravenous Once     umeclidinium-vilanterol  1 puff Inhalation Daily

## 2023-04-13 NOTE — PLAN OF CARE
A&O x 2, disoriented to place and situation. VSS on BiPAP over night, 4LNC while awake. Tele: SR. Q2h turn/repos. Minced/moist diet, thick liquid tolerating well. 2xPIV, SL. Coccyx PI, meplex in place. Lungs dim, crackles in lower lobes. Pt denies pain. Cardona in place. -BM, +BS. Continue plan of care.

## 2023-04-13 NOTE — CONSULTS
CLINICAL NUTRITION SERVICES  -  ASSESSMENT NOTE      Recommendations Ordered by Registered Dietitian (RD):   - ordered chocolate Magic Cup with meals TID  - RD introduced self to pt, role in care. Discussed options for ONS. Encouraged protein intake with meals   - ordered thera-vit-m for wound healing   Future/Additional Recommendations:   Obtain nutrition hx when pt is available    Malnutrition:   % Weight Loss:  Difficult to assess  % Intake:  Unable to assess  Subcutaneous Fat Loss:  Orbital region mild depletion and Upper arm region mild depletion - brief visual exam   Muscle Loss:  Clavicle bone region mild depletion  - brief visual exam   Fluid Retention:  Trace to mild generalized edema     Malnutrition Diagnosis: Unable to determine due to difficulty evaluating wt trends and lack of nutrition hx        REASON FOR ASSESSMENT  Ron Gilmore is a 82 year old male seen by Registered Dietitian for Nutrition Lon < 3  Comments: Pt would like chocolate boost    NUTRITION HISTORY  - Information obtained from chart review  - Unable to obtain nutrition history from pt. Pt was busy with speech during visit   - PMH of T2DM, HTN, HLD, COPD, BPH, CHF, depression, hx of DVT, CVA  - recently hospitalized from 4/3-4/9 for acute hypoxic respiratory failure thought secondary to possible aspiration vs healthcare associated pneumonia and discharged back to his [facility] on 4/9.      CURRENT NUTRITION ORDERS  Diet Order: Level 5: Minced & Moist Dysphagia Diet  and Mildly Thick  Thickened Liquids     Diet adv yesterday to pureed diet, mildly thick liquids. Previously NPO until 4/11    Current Intake/Tolerance:  - visited with pt this afternoon. Pt was busy with speech pathologist. Pt reported he would like a chocolate Boost-- RD discussed with pt that [facility] has Ensure Enlive. Pt questioned if it will be thickened. Pt does not want Ensure if it will thickened. Pt open to chocolate Magic cup. RD encouraged pt to have magic cup with meals  "for additional protein intake.   - per nursing flow sheet, 50% intake documented   - per health touch, pt had 2 meals yesterday and lunch today       PER CHART REVIEW  4/12: SLP eval = Moderate oropharyngeal dysphagia --> recommend minced and moist diet with mildly thick liquids     4/11: WOC  Skin Injury Location: BL buttock/Coccyx  Skin injury due to: Mixed Etiology: Chronic skin injury (often related to prolonged recliner use), Friction and Incontinence associated dermatitis (IAD)  STATUS: initial assessment this admission  Pressure Injury Location: BL ears  Wound type: Pressure Injury     Pressure Injury Stage: 2, present on admission to  tops of ears due to chronic use of oxygen tubing, showing improvement  STATUS: initial assessment this admission    NUTRITION FOCUSED PHYSICAL ASSESSMENT FOR DIAGNOSING MALNUTRITION)  Yes - brief visual          Observed:    Subcutaneous fat loss (refer to documentation in Malnutrition section)    ANTHROPOMETRICS  Height: 6' 0\"  Weight: 226 lbs 3.07 oz  Body mass index is 30.68 kg/m .  Weight Status:  Obesity Grade I BMI 30-34.9  IBW: 80.9 kg  % IBW: 127%  Weight History: difficult to evaluate variable wt hx  Wt Readings from Last 10 Encounters:   04/13/23 102.6 kg (226 lb 3.1 oz)   04/06/23 111.6 kg (246 lb 1.6 oz)   03/01/23 108.9 kg (240 lb)   09/28/22 97.3 kg (214 lb 9.6 oz)   05/01/22 107.4 kg (236 lb 12.8 oz)   04/15/22 108.9 kg (240 lb)   03/28/22 108.9 kg (240 lb)   11/22/21 106.6 kg (235 lb)   11/10/21 106.8 kg (235 lb 8 oz)   07/18/21 83.7 kg (184 lb 9.6 oz)       LABS  Labs reviewed:   Lab Results   Component Value Date     04/12/2023    POTASSIUM 4.2 04/13/2023    BUN 26.2 (H) 04/12/2023    CR 0.96 04/12/2023    MAG 2.3 04/07/2023    PHOS 3.4 03/22/2021    ALKPHOS 74 04/10/2023    ALT 21 04/10/2023    AST 22 04/10/2023     Lab Results   Component Value Date     (H) 04/13/2023     (H) 04/13/2023     (H) 04/13/2023     (H) " 04/13/2023     (H) 04/12/2023       MEDICATIONS  Medications reviewed: lasix, insulin aspart (HSSI), protonix, prednisone      ASSESSED NUTRITION NEEDS PER APPROVED PRACTICE GUIDELINES:  Dosing Weight: 86 kg (adjusted, based on 102.6 kg-- 4/13)  Estimated Energy Needs: 2150+ kcals (25+ Kcal/Kg)  Justification: maintenance  Estimated Protein Needs: 103-129 grams protein (1.2-1.5 g pro/Kg)  Justification: wound healing, preservation of lean body mass and increased needs r/t age  Estimated Fluid Needs: 1 mL/Kcal  Justification: maintenance OR per provider pending fluid status      MALNUTRITION:  % Weight Loss:  Difficult to assess  % Intake:  Unable to assess  Subcutaneous Fat Loss:  Orbital region mild depletion and Upper arm region mild depletion - brief visual exam   Muscle Loss:  Clavicle bone region mild depletion  - brief visual exam   Fluid Retention:  Trace to mild generalized edema     Malnutrition Diagnosis: Unable to determine due to difficulty evaluating wt trends and lack of nutrition hx         NUTRITION DIAGNOSIS:  Increased nutrient needs (protein) related to wound healing as evidenced by min 1.2 g/kg protein needs, and need for ONS       NUTRITION INTERVENTIONS  Recommendations / Nutrition Prescription  - ordered chocolate Magic Cup with meals TID  - RD introduced self to pt, role in care. Discussed options for ONS. Encouraged protein intake with meals   - ordered thera-vit-m for wound healing    Implementation  Nutrition education   Medical Food Supplement  Multivitamin/Mineral    Nutrition Goals  Pt to consume >50% of TID meals + TID supplements       MONITORING AND EVALUATION:  Progress towards goals will be monitored and evaluated per protocol and Practice Guidelines      Madeleine Vail RD, LD

## 2023-04-13 NOTE — PLAN OF CARE
Goal Outcome Evaluation:      Plan of Care Reviewed With: patient    Overall Patient Progress: improvingOverall Patient Progress: improving    Nursing Note    Patient Information  Name: Ron Gilmore  Age: 82 year old    Assessment  Orientation/Neuro: Alert and Oriented x4 but forgetful  Cardiac/Tele: SR with BBB and prolonged QT  Resp: ROSS    GI/: No nausea, vomiting, abdominal pain, diarrhea, constipation or change in bowel habits  Cardona cath in place  Mobility: A2 lift  Pain: denies   Diet: Orders Placed This Encounter      Combination Diet Minced and Moist Diet (level 5); Mildly Thick (level 2) (by spoon)  Nutrition consult placed - snacks and supplements ordered - pt would like chocolate supplement     Vital Signs  B/P: 138/78, T: 97.9, P: 60, R: 18, O2: 93% on 3L NC    Plan  Potential discharge tomorrow     Jud Ervin RN

## 2023-04-13 NOTE — PLAN OF CARE
[id number]: A&O to person, place and situation. Forgetful VSS on 3-5L NC. Bipap at night, placed at 2230. Tele SR BBB with occ PVCs. Baseline L sided weakness and hoarse speech. LS diminished with fine crackles. BLE +3 edema. Bg checks. Wounds to coccyx and L ear, dressings CDI. Chronic chirinos catheter in place. Incontinent of bowel. Repo q2hr. Contact precautions for VRE. Up with lift. Pill given whole in applesauce, tolerated well. Plan for return to facility 4-13.

## 2023-04-13 NOTE — CONSULTS
"RT Chronic Pulmonary Disease Specialist Consult   COPD Initial Assessment   2023    Patient: Ron Gilmore      :  1940                    MRN:[id number]      Reason for Consult:  COPD EDUC and treatment optimization     History of Present Illness: Ron Gilmore is a 82 year old male who was actually just hospitalized from 4/3- for acute hypoxic respiratory failure thought secondary to possible aspiration vs healthcare associated pneumonia and discharged back to his nursing home on .  He returned to our ED on 4/10/2023 due to worsening respiratory failure     Smoking Hx:  Per patient, quit 50 years ago                  Pulmonologist/Last office visit: None     Most recent  PFT/interpretation on: None           Sleep Studies:  None    Home Oxygen Use:   Baseline 2LPM       Pulmonary Rehab History:    None    Home respiratory medications include:      **Anoro once daily   **DuoNeb TID  IP Meds:  **Anoro once daily    Assessment:  Semi-fowlers in hosp bed. Awake, alert, and oriented. Not in resp distress. Sp02 high 90s on 4LPM, turned him down to 2lpm. Sp02 90-92%. BS=slight coarse, otherwise overall diminished. Exhibits strong, dry, non-productive cough. Attempted to instruct on flutter valve, not able to following/remember directions.     Assessed ins flows. Although he's able to breath in adequately, he's not able to sustain more than 1 second. Most inhalers require a sustained inhalation of 3-4 seconds. As such, recommending all NEB tx regimen--IP and OP.     Although chest CT shows \"advanced emphysema,\" no PFTs or OP pulm encounters found on EMR.     Action:         Evaluated patients inspiratory flow using In-Check device:     --Low resistance settinLPM, sufficient inspiratory flow for rescue inhalers, which requuire a slow inhalation of 20-60 LPM for optimal drug disposition with 5-10 second breath hold.      --Medium resistance settinLPM, borderline insufficient inspiratory flow for PTA " "Anoro, which requires a fast and deep inhalation of 30-80LPM with 5-10 second breath hold.     --High resistance setting: <20LPM, insufficient inspiratory flow for any maintenance inhaler      Recommendations:    **Due to poor insp flows, please consider switching him to Perforomist neb BID (discontinue Anoro) for IP and OP; please add DuoNeb Q6PRN. Per chart review, already has a newer neb compressor/machine    **PT/OT consult to perform cognitive evaluation, assess patients functional abilities, limitations, home care needs, and make recommendations for safe transition to home or TCU    **Home Oxygen Assessment Testing 24-48 hours prior to discharge to determine O2 needs at rest and with exertion/activity. If the patient requires oxygen at rest, the walk test must be performed using the new baseline O2 to determine if patient needs more oxygen with activity.     In the event patient qualifies for oxygen, at rest or with activity, please use the following verbiage in oxygen order: \"Please provide portable oxygen concentrator AND please test for oxygen conserving device using ____LPM to maintain sats between ____)      I spent 30 minutes with the patient.    Regulo Casas, RRT, CTTS  Chronic Pulmonary Disease Specialist  Cardiopulmonary Services   Office: [phone]  Pager: [phone]        "

## 2023-04-14 VITALS
RESPIRATION RATE: 18 BRPM | OXYGEN SATURATION: 94 % | BODY MASS INDEX: 30.64 KG/M2 | SYSTOLIC BLOOD PRESSURE: 118 MMHG | DIASTOLIC BLOOD PRESSURE: 57 MMHG | HEART RATE: 76 BPM | TEMPERATURE: 98.3 F | WEIGHT: 226.19 LBS | HEIGHT: 72 IN

## 2023-04-14 LAB
CREAT SERPL-MCNC: 0.96 MG/DL (ref 0.67–1.17)
GFR SERPL CREATININE-BSD FRML MDRD: 80 ML/MIN/1.73M2
GLUCOSE BLDC GLUCOMTR-MCNC: 114 MG/DL (ref 70–99)
GLUCOSE BLDC GLUCOMTR-MCNC: 140 MG/DL (ref 70–99)
GLUCOSE BLDC GLUCOMTR-MCNC: 152 MG/DL (ref 70–99)
GLUCOSE BLDC GLUCOMTR-MCNC: 186 MG/DL (ref 70–99)
GLUCOSE BLDC GLUCOMTR-MCNC: 95 MG/DL (ref 70–99)
PLATELET # BLD AUTO: 261 10E3/UL (ref 150–450)
POTASSIUM SERPL-SCNC: 3.8 MMOL/L (ref 3.4–5.3)

## 2023-04-14 PROCEDURE — 85049 AUTOMATED PLATELET COUNT: CPT | Performed by: INTERNAL MEDICINE

## 2023-04-14 PROCEDURE — 82565 ASSAY OF CREATININE: CPT | Performed by: INTERNAL MEDICINE

## 2023-04-14 PROCEDURE — 999N000105 HC STATISTIC NO DOCUMENTATION TO SUPPORT CHARGE

## 2023-04-14 PROCEDURE — 36415 COLL VENOUS BLD VENIPUNCTURE: CPT | Performed by: INTERNAL MEDICINE

## 2023-04-14 PROCEDURE — 250N000009 HC RX 250: Performed by: INTERNAL MEDICINE

## 2023-04-14 PROCEDURE — 84132 ASSAY OF SERUM POTASSIUM: CPT | Performed by: INTERNAL MEDICINE

## 2023-04-14 PROCEDURE — 250N000012 HC RX MED GY IP 250 OP 636 PS 637: Performed by: INTERNAL MEDICINE

## 2023-04-14 PROCEDURE — 250N000013 HC RX MED GY IP 250 OP 250 PS 637: Performed by: INTERNAL MEDICINE

## 2023-04-14 PROCEDURE — 99239 HOSP IP/OBS DSCHRG MGMT >30: CPT | Performed by: INTERNAL MEDICINE

## 2023-04-14 RX ORDER — FORMOTEROL FUMARATE DIHYDRATE 20 UG/2ML
20 SOLUTION RESPIRATORY (INHALATION) EVERY 12 HOURS
Status: DISCONTINUED | OUTPATIENT
Start: 2023-04-14 | End: 2023-04-14 | Stop reason: HOSPADM

## 2023-04-14 RX ORDER — PREDNISONE 20 MG/1
40 TABLET ORAL DAILY
Qty: 6 TABLET | Refills: 0 | Status: SHIPPED | OUTPATIENT
Start: 2023-04-15 | End: 2023-04-18

## 2023-04-14 RX ORDER — FORMOTEROL FUMARATE DIHYDRATE 20 UG/2ML
20 SOLUTION RESPIRATORY (INHALATION) EVERY 12 HOURS
Qty: 120 ML | Refills: 1 | Status: SHIPPED | OUTPATIENT
Start: 2023-04-14 | End: 2024-08-20

## 2023-04-14 RX ADMIN — PAROXETINE HYDROCHLORIDE 10 MG: 10 TABLET, FILM COATED ORAL at 09:19

## 2023-04-14 RX ADMIN — FORMOTEROL FUMARATE DIHYDRATE 20 MCG: 20 SOLUTION RESPIRATORY (INHALATION) at 12:05

## 2023-04-14 RX ADMIN — FUROSEMIDE 30 MG: 20 TABLET ORAL at 09:18

## 2023-04-14 RX ADMIN — PAROXETINE 40 MG: 40 TABLET, FILM COATED ORAL at 09:19

## 2023-04-14 RX ADMIN — METOPROLOL TARTRATE 12.5 MG: 25 TABLET, FILM COATED ORAL at 09:19

## 2023-04-14 RX ADMIN — UMECLIDINIUM BROMIDE AND VILANTEROL TRIFENATATE 1 PUFF: 62.5; 25 POWDER RESPIRATORY (INHALATION) at 09:20

## 2023-04-14 RX ADMIN — MULTIPLE VITAMINS W/ MINERALS TAB 1 TABLET: TAB at 09:20

## 2023-04-14 RX ADMIN — ASPIRIN 81 MG: 81 TABLET, COATED ORAL at 09:19

## 2023-04-14 RX ADMIN — ATORVASTATIN CALCIUM 10 MG: 10 TABLET, FILM COATED ORAL at 09:18

## 2023-04-14 RX ADMIN — ALLOPURINOL 300 MG: 300 TABLET ORAL at 09:19

## 2023-04-14 RX ADMIN — PANTOPRAZOLE SODIUM 40 MG: 40 TABLET, DELAYED RELEASE ORAL at 09:18

## 2023-04-14 RX ADMIN — PREDNISONE 40 MG: 20 TABLET ORAL at 09:18

## 2023-04-14 ASSESSMENT — ACTIVITIES OF DAILY LIVING (ADL)
ADLS_ACUITY_SCORE: 69

## 2023-04-14 NOTE — PLAN OF CARE
"Speech Language Therapy Discharge Summary    Reason for therapy discharge:    Discharged to [facility] with  SLP    Progress towards therapy goal(s). See goals on Care Plan in [facility] electronic health record for goal details.  Goals not met.  Barriers to achieving goals:   discharge from facility.    Therapy recommendation(s):    Continued therapy is recommended.  Rationale/Recommendations:  Ongoing dysphagia management: Meal follow up, trial SB6 textures if dentures obtained, strategies for liquids by spoon, FWP (ice chips).    Per evaluating SLP \"Clinical and video swallow study completed: recommend minced and moist diet with mildly thick liquids by spoon, 1:1 feeding assistance while in hospital.  Patient reporting sudden onset of worsened voice, throat pain, and swallowing difficulty associated with nebulizer treatment when liquid medication dripped into his mouth and was possibly aspirated prior to his previous admission.  There was no aspiration during swallow study today, however, with significant swallow delay, drinking liquids in large quantity with head back position (drinking from water/pop bottle at home) may pose aspiration risk with spillover from pyriform sinus.\"    *Pt not seen by discharging therapist on this date, note written based on previous treating therapist's notes and recommendations         "

## 2023-04-14 NOTE — PROGRESS NOTES
Care Management Follow Up    Length of Stay (days): 4    Expected Discharge Date: 04/14/2023     Concerns to be Addressed:       Patient plan of care discussed at interdisciplinary rounds: Yes    Anticipated Discharge Disposition: Assisted Living, Home Care     Anticipated Discharge Services:    Anticipated Discharge DME:      Patient/family educated on Medicare website which has current facility and service quality ratings: no  Education Provided on the Discharge Plan:    Patient/Family in Agreement with the Plan: yes    Referrals Placed by CM/SW: Post Acute Facilities, Homecare  Private pay costs discussed: Not applicable    Additional Information:  JEROME left a message for RN Shireen with [facility] letting her know that patient is ready to discharge back to them today. Asking for a call back to coordinate. Appears [facility] has accepted patient for home care.     Addendum:    JEROME spoke with Shireen at [facility] and she confirmed patient can come back today. JEROME faxed discharge orders to [phone]. [facility] stretcher ride set up for [phone]. JEROME will complete PCS.     JEROME will continue to follow.       TIFFANY Zuniga

## 2023-04-14 NOTE — PLAN OF CARE
[id number]    AO x4, forgetful. VSS on 3-4LNC (baseline). Denies pain, nausea, SOB. Chronic chirinos in place, AUOP. Minced and moist diet, 1:1 feedings w/ tsp, mildly thickened liquids. Assist x 2, turn and repos Q2H. Prepare to discharge back to AL facility this afternoon.

## 2023-04-14 NOTE — PROGRESS NOTES
Care Management Discharge Note    Discharge Date: 04/14/2023       Discharge Disposition: Assisted Living    Discharge Services:      Discharge DME:      Discharge Transportation:      Private pay costs discussed: Not applicable    PAS Confirmation Code:    Patient/family educated on Medicare website which has current facility and service quality ratings: no    Education Provided on the Discharge Plan:    Persons Notified of Discharge Plans: yes  Patient/Family in Agreement with the Plan: yes    Handoff Referral Completed: No    Additional Information:  Patient is discharging to [facility] via [facility] stretcher transport between [id number]. Patient's family aware and in agreement. Reason for stretcher is deconditioned and confusion with need for supervision in the back of the rig. PCS completed and faxed.         TIFFANY Zuniga

## 2023-04-14 NOTE — PLAN OF CARE
A&O x 3-4, forgetful. VSS on 4LNC. Q2h turn/repos. Minced/moist diet, thick liquid tolerating well. 2xPIV, SL. Coccyx PI, meplex in place. Lungs dim, crackles in lower lobes. Pt denies pain. Cardona in place. -BM, +BS. Continue plan of care.

## 2023-04-14 NOTE — DISCHARGE SUMMARY
[facility]    Discharge Summary  Hospitalist    Date of Admission:  4/10/2023  Date of Discharge:  4/14/2023  Discharging Provider: Romulo Cavanaugh MD, MD    Discharge Diagnoses     Acute on chronic hypoxic respiratory failure   Suspected aspiration  Possible COPD acute exacerbation   Dysphagia  Suspected acute exacerbation of chronic heart failure with preserved EF  Hypertension  Hyperlipidemia  Recent aspiration vs healthcare associated pneumonia  DM type II with neuropathy   H/o CVA   BPH w/chronic indwelling chirinos catheter  Stage 2 Pressure Injury of Bilateral Ears-present on admission       Hospital Course:    Ron Gilmore is a 82 year old male who was actually just hospitalized from 4/3-4/9 for acute hypoxic respiratory failure thought secondary to possible aspiration vs healthcare associated pneumonia and discharged back to his [facility] on 4/9.  He returned to our ED on 4/10/2023 due to worsening respiratory failure     Acute on chronic hypoxic respiratory failure   Suspected aspiration  Possible COPD acute exacerbation   -Patient was at his long-term this morning when they noted the patient to have increased work of breathing and hypoxic on his baseline 2 L.  Patient was satting in to the 80s on 2 L.  Initially in the ED he was on 15 L and subsequently had to be placed on BiPAP.  On exam in the ED patient had rhonchi.  BNP is elevated at 2,833.  Troponin minimally elevated at 35.  Procalcitonin 0.08. Initial VBG was 7.36/55/36 but improved with BiPAP.  CXR showed low lung volume, emphysema, mild cardiomegaly but no other obvious edema.    -I suspect his reason for presentation was mild episode of aspiration.  Rest of the work-up essentially unremarkable.  Moreover he improved fairly quickly after coming to the hospital.  -Initially on BiPAP, weaned BiPAP to nasal cannula at 2-3 L, which is his baseline  -Initially on IV Solu-Medrol, transition to oral prednisone, will continue for 3 more  days then stop  -Completed PTA course of antibiotics on 4/12.  -Respiratory therapist consulted, given poor inspiratory volume they recommended discontinuing Anoro and switching to Perforomist nebulization.     Dysphagia  -Patient has known dysphagia and I suspect acute respiratory decompensation yesterday was most likely related to another silent aspiration event  -SLP following, patient underwent cervical and video swallow evaluation.  They recommend minced and moist diet with mildly thick liquid by spoon, one-to-one feeding assistance while in hospital.  There was no aspiration during swallow study however patient has significant swallow delay and is at risk for aspiration.     Suspected acute exacerbation of chronic heart failure with preserved EF  Hypertension  Hyperlipidemia  -CXR in the ED showed cardiomegaly.  BNP elevated and rhonchi on exam. Last echo from 5/16/21 showed a normal EF of 60-65% with moderate to severe concentric LVH but no RWMA or significant valvular disease    -Patient has a known history of diastolic congestive heart failure  -Initially received few doses of IV Lasix  -Resumed PTA dose of Lasix 30 mg p.o. daily   -Continue prior to admission metoprolol     Recent aspiration vs healthcare associated pneumonia  -Patient was just discharged from the hospital for this on 4/9.  While here he was treated with IV Zosyn and Azithromycin and discharged on Augmentin.    -Completed antibiotic course this hospitalization.     DM type II with neuropathy   HgbA1c 6.9 during last hospitalization.  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-present on admission  -Due to chronic oxygen use     Stable chronic medical issues     H/o CVA - persistent left sided deficits   Dysphagia   BPH w/chronic indwelling chirinos catheter       Romulo Cavanaugh MD, MD    Significant Results and Procedures       Pending Results     Unresulted Labs Ordered in the Past 30  Days of this Admission     Date and Time Order Name Status Description    4/10/2023  1:27 PM Blood Culture Peripheral Blood Preliminary     4/10/2023  1:27 PM Blood Culture Peripheral Blood Preliminary           Code Status   DNR       Primary Care Physician   Kalen Metcalf    Physical Exam   Temp: 98.4  F (36.9  C) Temp src: Oral BP: 136/79 Pulse: 64   Resp: 18 SpO2: 96 % O2 Device: Nasal cannula Oxygen Delivery: 3 LPM    Constitutional: AAOX3, NAD  Respiratory: Diminished air entry bilaterally, no active wheeze.  Normal effort of breathing cardiovascular: RRR, No murmur  GI: Soft, Non- tender, BS- normoactive  Skin/Integument: Warm and dry, no rashes  Neuro: CN- grossly intact    Discharge Disposition   Discharged to assisted living  Condition at discharge: Stable    Consultations This Hospital Stay   CARE MANAGEMENT / SOCIAL WORK IP CONSULT  SPEECH LANGUAGE PATH ADULT IP CONSULT  WOUND OSTOMY CONTINENCE NURSE  IP CONSULT  VASCULAR ACCESS ADULT IP CONSULT  PHARMACY LIAISON FOR MEDICATION COVERAGE CONSULT  IP RESPIRATORY CARE CHRONIC PULMONARY DISEASE SPECIALIST  NUTRITION SERVICES ADULT IP CONSULT    Time Spent on this Encounter   I, Romulo Cavanaugh MD, personally saw the patient today and spent greater than 30 minutes discharging this patient.    Discharge Orders      Medication Therapy Management Referral      Reason for your hospital stay    Acute on chronic hypoxic respiratory failure     Follow-up and recommended labs and tests    Follow up with primary care provider, Kalen Metcalf, within 7 days for hospital follow- up.     Activity    Your activity upon discharge: activity as tolerated     Resume Home Care Services     Oxygen Adult/Peds    .     Diet    Follow this diet upon discharge: Orders Placed This Encounter      Snacks/Supplements Adult: Magic Cup; With Meals      Combination Diet Minced and Moist Diet (level 5); Mildly Thick (level 2) (by spoon)     Discharge Medications   Current Discharge  Medication List      START taking these medications    Details   formoterol (PERFOROMIST) 20 MCG/2ML neb solution Take 2 mLs (20 mcg) by nebulization every 12 hours for 60 days  Qty: 120 mL, Refills: 1    Comments: Future refills by PCP Dr. Kalen Metcalf with phone number [phone].  Associated Diagnoses: Chronic respiratory failure with hypoxia (H)      predniSONE (DELTASONE) 20 MG tablet Take 2 tablets (40 mg) by mouth daily for 3 days  Qty: 6 tablet, Refills: 0    Associated Diagnoses: Chronic respiratory failure with hypoxia (H)         CONTINUE these medications which have NOT CHANGED    Details   allopurinol (ZYLOPRIM) 300 MG tablet Take 300 mg by mouth every morning      ammonium lactate (LAC-HYDRIN) 12 % external lotion Apply topically daily Apply thin film to bilateral feet and legs      aspirin (ASA) 81 MG EC tablet Take 1 tablet (81 mg) by mouth daily  Qty:      Associated Diagnoses: Acute and chronic respiratory failure with hypoxia (H)      atorvastatin (LIPITOR) 10 MG tablet Take 10 mg by mouth every morning      cyanocobalamin (VITAMIN B-12) 500 MCG tablet Take 500 mcg by mouth every morning      furosemide (LASIX) 20 MG tablet Take 1.5 tablets (30 mg) by mouth daily  Qty: 45 tablet, Refills: 0    Comments: Future refills by PCP Dr. Kalen Metcalf with phone number [phone].  Associated Diagnoses: Acute diastolic congestive heart failure (H)      !! hypromellose (ARTIFICIAL TEARS) 0.5 % SOLN ophthalmic solution 2 drops 2 times daily Into the affected eye (eye irritation)  0900, 2000      !! ipratropium - albuterol 0.5 mg/2.5 mg/3 mL (DUONEB) 0.5-2.5 (3) MG/3ML neb solution Take 1 vial by nebulization 3 times daily 0900, 1400, 2100      ipratropium-albuterol (COMBIVENT RESPIMAT)  MCG/ACT inhaler Inhale 1 puff into the lungs 4 times daily 0900, 1200 ,1600 ,2000      methenamine hippurate (HIPREX) 1 g tablet Take 1 g by mouth 2 times daily      metoprolol tartrate (LOPRESSOR) 25 MG tablet  Take 0.5 tablets (12.5 mg) by mouth 2 times daily    Associated Diagnoses: Cerebrovascular accident (CVA), unspecified mechanism (H)      !! Neomycin-Bacitracin-Polymyxin (TRIPLE ANTIBIOTIC) 3.5-400-5000 OINT ointment Externally apply topically three times a week To left inner buttocks until resolved      !! Neomycin-Bacitracin-Polymyxin (TRIPLE ANTIBIOTIC) 3.5-400-5000 OINT ointment Externally apply topically daily as needed (to open area)      pantoprazole (PROTONIX) 40 MG EC tablet Take 40 mg by mouth 2 times daily      !! PARoxetine (PAXIL) 10 MG tablet Take 10 mg by mouth every morning Take with 40mg tablet to equal 50mg total      !! PARoxetine (PAXIL) 40 MG tablet Take 40 mg by mouth every morning Take with 10mg tablet to equal 50mg total      polyethylene glycol (MIRALAX) 17 GM/Dose powder Take 17 g by mouth daily  Qty: 510 g    Associated Diagnoses: Constipation, unspecified constipation type      !! senna-docusate (SENOKOT-S/PERICOLACE) 8.6-50 MG tablet Take 1 tablet by mouth daily      !! Skin Protectants, Misc. (LANTISEPTIC SKIN PROTECTANT EX) Externally apply topically 2 times daily Apply a thin film to vincent area/buttocks      White Petrolatum ointment Apply topically 2 times daily To nares      acetaminophen (TYLENOL) 325 MG tablet Take 650 mg by mouth every 4 hours as needed for mild pain as needed for pain/fever Max dose 3000mg/24hr      Emollient (AMLACTIN ULTRA EX) Apply topically daily as needed TO FEET      !! hypromellose (ARTIFICIAL TEARS) 0.5 % SOLN ophthalmic solution 1 drop 2 times daily as needed (eye irritation) Into affected eye      !! ipratropium - albuterol 0.5 mg/2.5 mg/3 mL (DUONEB) 0.5-2.5 (3) MG/3ML neb solution Take 1 vial by nebulization daily as needed for shortness of breath, wheezing or cough      loperamide (IMODIUM) 2 MG capsule Take 2 mg by mouth every 6 hours as needed for diarrhea      !! senna-docusate (SENOKOT-S/PERICOLACE) 8.6-50 MG tablet Take 1 tablet by mouth daily  as needed for constipation      simethicone (MYLICON) 125 MG chewable tablet Take 125 mg by mouth 3 times daily as needed for intestinal gas      !! Skin Protectants, Misc. (LANTISEPTIC SKIN PROTECTANT EX) Externally apply topically 2 times daily as needed Apply a thin film to vincent area/buttocks       !! - Potential duplicate medications found. Please discuss with provider.      STOP taking these medications       amoxicillin-clavulanate (AUGMENTIN) 875-125 MG tablet Comments:   Reason for Stopping:         umeclidinium-vilanterol (ANORO ELLIPTA) 62.5-25 MCG/ACT oral inhaler Comments:   Reason for Stopping:             Allergies   No Known Allergies  Data   Most Recent 3 CBC's:  Recent Labs   Lab Test 04/11/23  1305 04/10/23  1243 04/09/23  0622 04/06/23  0514 04/04/23  2221   WBC  --  9.4  --  9.1 16.6*   HGB  --  12.5*  --  11.1* 12.5*   MCV  --  100  --  101* 101*    247 191 154 183      Most Recent 3 BMP's:  Recent Labs   Lab Test 04/14/23  0830 04/14/23  0512 04/14/23  0231 04/13/23  0944 04/13/23  0721 04/12/23  1807 04/12/23  1542 04/12/23  1211 04/12/23  0545 04/11/23  1431 04/11/23  1305 04/10/23  1727 04/10/23  1243   NA  --   --   --   --   --   --   --   --  141  --  140  --  139   POTASSIUM  --   --   --   --  4.2  --  3.8  --  3.3*  --  3.6  --  3.7   CHLORIDE  --   --   --   --   --   --   --   --  98  --  99  --  101   CO2  --   --   --   --   --   --   --   --  31*  --  29  --  28   BUN  --   --   --   --   --   --   --   --  26.2*  --  20.3  --  13.7   CR  --   --   --   --   --   --   --   --  0.96  --  0.89  --  0.80   ANIONGAP  --   --   --   --   --   --   --   --  12  --  12  --  10   RAJ  --   --   --   --   --   --   --   --  9.1  --  8.9  --  8.7*   GLC 95 140* 186*   < >  --    < >  --    < > 132*  136*   < > 172*   < > 151*    < > = values in this interval not displayed.     Most Recent 2 LFT's:  Recent Labs   Lab Test 04/10/23  1243 04/03/23  1058   AST 22 21   ALT 21 17    ALKPHOS 74 76   BILITOTAL 0.4 0.8     Most Recent INR's and Anticoagulation Dosing History:  Anticoagulation Dose History         Latest Ref Rng & Units 7/13/2006 1/14/2020 3/10/2021 3/12/2021   Recent Dosing and Labs   INR 0.85 - 1.15 1.01   1.13   1.58   1.20         11/8/2021 9/23/2022   Recent Dosing and Labs   INR 1.13   1.16                Most Recent 3 Troponin's:  Recent Labs   Lab Test 05/18/21  1938 05/10/21  2110 05/01/21  2152   TROPI <0.015 <0.015 <0.015     Most Recent Cholesterol Panel:  Recent Labs   Lab Test 01/16/20  0735   CHOL 87   LDL 40   HDL 30*   TRIG 83     Most Recent 6 Bacteria Isolates From Any Culture (See EPIC Reports for Culture Details):  Recent Labs   Lab Test 05/30/21  1208 05/18/21  2146 05/18/21  2137 05/18/21  1938 05/11/21  0117 05/10/21  2306   CULT >100,000 colonies/mL  Candida tropicalis  Susceptibility testing not routinely done  * No growth >100,000 colonies/mL  Candida tropicalis  Susceptibility testing not routinely done  * No growth 10,000 to 50,000 colonies/mL  Enterococcus faecalis  *  10,000 to 50,000 colonies/mL  Candida tropicalis  Susceptibility testing not routinely done  * No growth     Most Recent TSH, T4 and A1c Labs:  Recent Labs   Lab Test 04/03/23  1058 05/01/22  1006 11/09/21  0832   TSH  --   --  1.09   A1C 6.9*   < >  --     < > = values in this interval not displayed.       Results for orders placed or performed during the hospital encounter of 04/10/23   XR Chest Port 1 View    Narrative    XR CHEST PORT 1 VIEW 4/10/2023 12:50 PM    HISTORY: SOB    COMPARISON: 4/4/2023      Impression    IMPRESSION: Low lung volumes. Emphysema is better seen on the prior CT  on 4/3/2023. Stable mild cardiomegaly. No pleural effusion or  pneumothorax. Bibasilar linear opacities, either atelectasis or  infiltrate. Implanted device left chest.    PENNY DUBON MD         SYSTEM ID:  [id number]   XR Video Swallow with SLP or OT    Narrative    VIDEO SWALLOWING EVALUATION    2023 10:10 AM     HISTORY: Dysphagia, aspiration history.    COMPARISON: None.    FLUOROSCOPY TIME: 2.0 minutes     Number of cine runs: 13    FINDINGS:    Thin: Premature spill to the piriforms. One episode of moderate flash  penetration. Otherwise normal.    Slightly thick: Premature spill to the piriforms. Otherwise normal.    Mildly thick: Premature spill to the piriforms. Otherwise normal.    Moderately thick: Not administered.    Puree: Normal.    Semisolid: Premature spill to the vallecula. Otherwise normal.    Regular: Not administered.    This study only includes the cervical esophagus.    RUBY CARBONE MD         SYSTEM ID:  [id number]   Echocardiogram Complete     Value    LVEF  55-60%    Narrative    [id number]  [id number]  [id number]  [id number]^EMORY^HOOD^CONNOR     [facility]  Echocardiography Laboratory  [address] [place] [address]     Name: SHERI THORPE  MRN: [id number]  : 1940  Study Date: 2023 11:27 AM  Age: 82 yrs  Gender: Male  Patient Location: [facility]  Reason For Study: CHF  Ordering Physician: HOOD CAT  Referring Physician: HOOD CAT  Performed By: No Andrade     BSA: 2.3 m2  Height: 72 in  Weight: 245 lb  HR: 92  ______________________________________________________________________________  Procedure  Complete Echo Adult. Optison (NDC #[id number]) given intravenously.  ______________________________________________________________________________  Interpretation Summary     The left ventricle is normal in size.  The visual ejection fraction is 55-60%.  No regional wall motion abnormalities noted.  The right ventricle is moderately dilated.  Mildly decreased right ventricular systolic function  Mild aortic root dilatation.  No significant valvular heart disease.  ______________________________________________________________________________  Left Ventricle  The left ventricle is normal in size. There is normal left  ventricular wall  thickness. The visual ejection fraction is 55-60%. No regional wall motion  abnormalities noted.     Right Ventricle  The right ventricle is moderately dilated. Mildly decreased right ventricular  systolic function.     Atria  Normal left atrial size. Right atrial size is normal. There is no color  Doppler evidence of an atrial shunt.     Mitral Valve  The mitral valve leaflets are mildly thickened. There is mild mitral annular  calcification. There is trace mitral regurgitation.     Tricuspid Valve  There is trace tricuspid regurgitation.     Aortic Valve  The aortic valve is not well visualized. No aortic regurgitation is present.     Pulmonic Valve  There is trace pulmonic valvular regurgitation.     Vessels  Mild aortic root dilatation. The ascending aorta is Borderline dilated.     Pericardium  There is no pericardial effusion.     Rhythm  Sinus rhythm was noted.  ______________________________________________________________________________  MMode/2D Measurements & Calculations     IVSd: 1.1 cm  LVIDd: 4.8 cm  LVIDs: 3.5 cm  LVPWd: 0.90 cm  FS: 26.4 %  LV mass(C)d: 175.0 grams  LV mass(C)dI: 75.3 grams/m2  Ao root diam: 4.2 cm  asc Aorta Diam: 3.7 cm  LVOT diam: 2.2 cm  LVOT area: 3.9 cm2  RWT: 0.37     Doppler Measurements & Calculations  MV E max kaushik: 49.3 cm/sec  MV A max kaushik: 85.7 cm/sec  MV E/A: 0.58  Ao V2 max: 84.3 cm/sec  Ao max PG: 3.0 mmHg  FLORA(V,D): 2.8 cm2  LV V1 max P.4 mmHg  LV V1 max: 60.0 cm/sec  LV V1 VTI: 13.1 cm  SV(LVOT): 50.6 ml  SI(LVOT): 21.8 ml/m2  PA V2 max: 76.0 cm/sec  PA max P.3 mmHg  PA acc time: 0.10 sec  AV Kaushik Ratio (DI): 0.71     ______________________________________________________________________________  Report approved by: Maynor Gardiner 2023 03:23 PM

## 2023-04-15 LAB
BACTERIA BLD CULT: NO GROWTH
BACTERIA BLD CULT: NO GROWTH

## 2023-04-19 ENCOUNTER — DOCUMENTATION ONLY (OUTPATIENT)
Dept: OTHER | Facility: CLINIC | Age: 83
End: 2023-04-19
Payer: MEDICARE

## 2023-05-04 ENCOUNTER — APPOINTMENT (OUTPATIENT)
Dept: CT IMAGING | Facility: CLINIC | Age: 83
DRG: 189 | End: 2023-05-04
Attending: EMERGENCY MEDICINE
Payer: MEDICARE

## 2023-05-04 ENCOUNTER — APPOINTMENT (OUTPATIENT)
Dept: GENERAL RADIOLOGY | Facility: CLINIC | Age: 83
DRG: 189 | End: 2023-05-04
Attending: EMERGENCY MEDICINE
Payer: MEDICARE

## 2023-05-04 ENCOUNTER — HOSPITAL ENCOUNTER (INPATIENT)
Facility: CLINIC | Age: 83
LOS: 4 days | Discharge: HOME HEALTH | DRG: 189 | End: 2023-05-08
Attending: EMERGENCY MEDICINE | Admitting: INTERNAL MEDICINE
Payer: MEDICARE

## 2023-05-04 DIAGNOSIS — T83.511A URINARY TRACT INFECTION ASSOCIATED WITH CATHETERIZATION OF URINARY TRACT, UNSPECIFIED INDWELLING URINARY CATHETER TYPE, INITIAL ENCOUNTER (H): ICD-10-CM

## 2023-05-04 DIAGNOSIS — N39.0 URINARY TRACT INFECTION ASSOCIATED WITH CATHETERIZATION OF URINARY TRACT, UNSPECIFIED INDWELLING URINARY CATHETER TYPE, INITIAL ENCOUNTER (H): ICD-10-CM

## 2023-05-04 DIAGNOSIS — A41.9 SEPSIS, DUE TO UNSPECIFIED ORGANISM, UNSPECIFIED WHETHER ACUTE ORGAN DYSFUNCTION PRESENT (H): ICD-10-CM

## 2023-05-04 DIAGNOSIS — J96.22 ACUTE ON CHRONIC RESPIRATORY FAILURE WITH HYPERCAPNIA (H): ICD-10-CM

## 2023-05-04 DIAGNOSIS — R31.9 URINARY TRACT INFECTION WITH HEMATURIA, SITE UNSPECIFIED: Primary | ICD-10-CM

## 2023-05-04 DIAGNOSIS — N39.0 URINARY TRACT INFECTION WITH HEMATURIA, SITE UNSPECIFIED: Primary | ICD-10-CM

## 2023-05-04 LAB
ALBUMIN SERPL BCG-MCNC: 3.5 G/DL (ref 3.5–5.2)
ALBUMIN UR-MCNC: 30 MG/DL
ALP SERPL-CCNC: 92 U/L (ref 40–129)
ALT SERPL W P-5'-P-CCNC: 26 U/L (ref 10–50)
ANION GAP SERPL CALCULATED.3IONS-SCNC: 11 MMOL/L (ref 7–15)
APPEARANCE UR: ABNORMAL
AST SERPL W P-5'-P-CCNC: 31 U/L (ref 10–50)
ATRIAL RATE - MUSE: 106 BPM
BACTERIA #/AREA URNS HPF: ABNORMAL /HPF
BASOPHILS # BLD AUTO: 0 10E3/UL (ref 0–0.2)
BASOPHILS NFR BLD AUTO: 0 %
BILIRUB SERPL-MCNC: 0.5 MG/DL
BILIRUB UR QL STRIP: NEGATIVE
BUN SERPL-MCNC: 19.3 MG/DL (ref 8–23)
CALCIUM SERPL-MCNC: 9.6 MG/DL (ref 8.8–10.2)
CHLORIDE SERPL-SCNC: 94 MMOL/L (ref 98–107)
COLOR UR AUTO: ABNORMAL
CREAT SERPL-MCNC: 1.31 MG/DL (ref 0.67–1.17)
DEPRECATED HCO3 PLAS-SCNC: 34 MMOL/L (ref 22–29)
DIASTOLIC BLOOD PRESSURE - MUSE: NORMAL MMHG
EOSINOPHIL # BLD AUTO: 0.1 10E3/UL (ref 0–0.7)
EOSINOPHIL NFR BLD AUTO: 2 %
ERYTHROCYTE [DISTWIDTH] IN BLOOD BY AUTOMATED COUNT: 15.5 % (ref 10–15)
GFR SERPL CREATININE-BSD FRML MDRD: 55 ML/MIN/1.73M2
GLUCOSE SERPL-MCNC: 146 MG/DL (ref 70–99)
GLUCOSE UR STRIP-MCNC: NEGATIVE MG/DL
HCO3 BLDV-SCNC: 39 MMOL/L (ref 21–28)
HCO3 BLDV-SCNC: 41 MMOL/L (ref 21–28)
HCT VFR BLD AUTO: 47.8 % (ref 40–53)
HGB BLD-MCNC: 14.8 G/DL (ref 13.3–17.7)
HGB UR QL STRIP: ABNORMAL
HOLD SPECIMEN: NORMAL
HYALINE CASTS: 40 /LPF
IMM GRANULOCYTES # BLD: 0 10E3/UL
IMM GRANULOCYTES NFR BLD: 1 %
INR PPP: 1.04 (ref 0.85–1.15)
INTERPRETATION ECG - MUSE: NORMAL
KETONES UR STRIP-MCNC: NEGATIVE MG/DL
LACTATE BLD-SCNC: 1.6 MMOL/L
LACTATE BLD-SCNC: 2.1 MMOL/L
LACTATE SERPL-SCNC: 1.4 MMOL/L (ref 0.7–2)
LEUKOCYTE ESTERASE UR QL STRIP: ABNORMAL
LYMPHOCYTES # BLD AUTO: 1 10E3/UL (ref 0.8–5.3)
LYMPHOCYTES NFR BLD AUTO: 17 %
MCH RBC QN AUTO: 31 PG (ref 26.5–33)
MCHC RBC AUTO-ENTMCNC: 31 G/DL (ref 31.5–36.5)
MCV RBC AUTO: 100 FL (ref 78–100)
MONOCYTES # BLD AUTO: 0.7 10E3/UL (ref 0–1.3)
MONOCYTES NFR BLD AUTO: 12 %
MUCOUS THREADS #/AREA URNS LPF: PRESENT /LPF
NEUTROPHILS # BLD AUTO: 3.9 10E3/UL (ref 1.6–8.3)
NEUTROPHILS NFR BLD AUTO: 68 %
NITRATE UR QL: NEGATIVE
NRBC # BLD AUTO: 0 10E3/UL
NRBC BLD AUTO-RTO: 0 /100
NT-PROBNP SERPL-MCNC: 99 PG/ML (ref 0–1800)
P AXIS - MUSE: 32 DEGREES
PCO2 BLDV: 69 MM HG (ref 40–50)
PCO2 BLDV: 71 MM HG (ref 40–50)
PH BLDV: 7.34 [PH] (ref 7.32–7.43)
PH BLDV: 7.38 [PH] (ref 7.32–7.43)
PH UR STRIP: 6.5 [PH] (ref 5–7)
PLATELET # BLD AUTO: 160 10E3/UL (ref 150–450)
PO2 BLDV: 16 MM HG (ref 25–47)
PO2 BLDV: 20 MM HG (ref 25–47)
POTASSIUM SERPL-SCNC: 3.8 MMOL/L (ref 3.4–5.3)
PR INTERVAL - MUSE: 162 MS
PROCALCITONIN SERPL IA-MCNC: 0.09 NG/ML
PROT SERPL-MCNC: 7.8 G/DL (ref 6.4–8.3)
QRS DURATION - MUSE: 132 MS
QT - MUSE: 366 MS
QTC - MUSE: 486 MS
R AXIS - MUSE: -2 DEGREES
RBC # BLD AUTO: 4.77 10E6/UL (ref 4.4–5.9)
RBC URINE: >182 /HPF
SAO2 % BLDV: 19 % (ref 94–100)
SAO2 % BLDV: 29 % (ref 94–100)
SODIUM SERPL-SCNC: 139 MMOL/L (ref 136–145)
SP GR UR STRIP: 1.02 (ref 1–1.03)
SYSTOLIC BLOOD PRESSURE - MUSE: NORMAL MMHG
T AXIS - MUSE: 12 DEGREES
TROPONIN T SERPL HS-MCNC: 34 NG/L
UROBILINOGEN UR STRIP-MCNC: NORMAL MG/DL
VENTRICULAR RATE- MUSE: 106 BPM
WBC # BLD AUTO: 5.7 10E3/UL (ref 4–11)
WBC URINE: >182 /HPF
YEAST #/AREA URNS HPF: ABNORMAL /HPF
YEAST #/AREA URNS HPF: ABNORMAL /HPF

## 2023-05-04 PROCEDURE — 83605 ASSAY OF LACTIC ACID: CPT

## 2023-05-04 PROCEDURE — 80053 COMPREHEN METABOLIC PANEL: CPT | Performed by: EMERGENCY MEDICINE

## 2023-05-04 PROCEDURE — 250N000009 HC RX 250: Performed by: EMERGENCY MEDICINE

## 2023-05-04 PROCEDURE — 87040 BLOOD CULTURE FOR BACTERIA: CPT | Performed by: EMERGENCY MEDICINE

## 2023-05-04 PROCEDURE — 85610 PROTHROMBIN TIME: CPT | Performed by: EMERGENCY MEDICINE

## 2023-05-04 PROCEDURE — 93005 ELECTROCARDIOGRAM TRACING: CPT

## 2023-05-04 PROCEDURE — 84145 PROCALCITONIN (PCT): CPT | Performed by: EMERGENCY MEDICINE

## 2023-05-04 PROCEDURE — 71045 X-RAY EXAM CHEST 1 VIEW: CPT

## 2023-05-04 PROCEDURE — 99291 CRITICAL CARE FIRST HOUR: CPT | Mod: 25

## 2023-05-04 PROCEDURE — 99223 1ST HOSP IP/OBS HIGH 75: CPT | Mod: AI | Performed by: INTERNAL MEDICINE

## 2023-05-04 PROCEDURE — 999N000157 HC STATISTIC RCP TIME EA 10 MIN

## 2023-05-04 PROCEDURE — 250N000011 HC RX IP 250 OP 636: Performed by: EMERGENCY MEDICINE

## 2023-05-04 PROCEDURE — 96365 THER/PROPH/DIAG IV INF INIT: CPT

## 2023-05-04 PROCEDURE — G1010 CDSM STANSON: HCPCS

## 2023-05-04 PROCEDURE — 258N000003 HC RX IP 258 OP 636: Performed by: EMERGENCY MEDICINE

## 2023-05-04 PROCEDURE — 84484 ASSAY OF TROPONIN QUANT: CPT | Performed by: EMERGENCY MEDICINE

## 2023-05-04 PROCEDURE — 87086 URINE CULTURE/COLONY COUNT: CPT | Performed by: EMERGENCY MEDICINE

## 2023-05-04 PROCEDURE — 5A09357 ASSISTANCE WITH RESPIRATORY VENTILATION, LESS THAN 24 CONSECUTIVE HOURS, CONTINUOUS POSITIVE AIRWAY PRESSURE: ICD-10-PCS | Performed by: EMERGENCY MEDICINE

## 2023-05-04 PROCEDURE — 120N000001 HC R&B MED SURG/OB

## 2023-05-04 PROCEDURE — 36415 COLL VENOUS BLD VENIPUNCTURE: CPT

## 2023-05-04 PROCEDURE — 36415 COLL VENOUS BLD VENIPUNCTURE: CPT | Performed by: EMERGENCY MEDICINE

## 2023-05-04 PROCEDURE — 81001 URINALYSIS AUTO W/SCOPE: CPT | Performed by: EMERGENCY MEDICINE

## 2023-05-04 PROCEDURE — 83880 ASSAY OF NATRIURETIC PEPTIDE: CPT | Performed by: EMERGENCY MEDICINE

## 2023-05-04 PROCEDURE — 85025 COMPLETE CBC W/AUTO DIFF WBC: CPT | Performed by: EMERGENCY MEDICINE

## 2023-05-04 RX ORDER — POLYVINYL ALCOHOL 14 MG/ML
SOLUTION/ DROPS OPHTHALMIC
COMMUNITY
Start: 2022-08-31 | End: 2023-08-25

## 2023-05-04 RX ORDER — CEFTRIAXONE 2 G/1
2 INJECTION, POWDER, FOR SOLUTION INTRAMUSCULAR; INTRAVENOUS ONCE
Status: COMPLETED | OUTPATIENT
Start: 2023-05-04 | End: 2023-05-04

## 2023-05-04 RX ORDER — IOPAMIDOL 755 MG/ML
79 INJECTION, SOLUTION INTRAVASCULAR ONCE
Status: COMPLETED | OUTPATIENT
Start: 2023-05-04 | End: 2023-05-04

## 2023-05-04 RX ADMIN — CEFTRIAXONE SODIUM 2 G: 2 INJECTION, POWDER, FOR SOLUTION INTRAMUSCULAR; INTRAVENOUS at 22:17

## 2023-05-04 RX ADMIN — SODIUM CHLORIDE, POTASSIUM CHLORIDE, SODIUM LACTATE AND CALCIUM CHLORIDE 1000 ML: 600; 310; 30; 20 INJECTION, SOLUTION INTRAVENOUS at 22:26

## 2023-05-04 RX ADMIN — SODIUM CHLORIDE, POTASSIUM CHLORIDE, SODIUM LACTATE AND CALCIUM CHLORIDE 1000 ML: 600; 310; 30; 20 INJECTION, SOLUTION INTRAVENOUS at 23:58

## 2023-05-04 RX ADMIN — SODIUM CHLORIDE 100 ML: 9 INJECTION, SOLUTION INTRAVENOUS at 21:54

## 2023-05-04 RX ADMIN — IOPAMIDOL 79 ML: 755 INJECTION, SOLUTION INTRAVENOUS at 21:54

## 2023-05-04 ASSESSMENT — ENCOUNTER SYMPTOMS
EYE PAIN: 0
HEADACHES: 1
FEVER: 0
DYSURIA: 0
WEAKNESS: 1
CHILLS: 0
BACK PAIN: 0
PALPITATIONS: 0
NECK PAIN: 0
CONFUSION: 1
RHINORRHEA: 0
HEMATURIA: 0
NAUSEA: 0
VOMITING: 0
COLOR CHANGE: 0
SHORTNESS OF BREATH: 1

## 2023-05-04 ASSESSMENT — ACTIVITIES OF DAILY LIVING (ADL)
ADLS_ACUITY_SCORE: 35
ADLS_ACUITY_SCORE: 35

## 2023-05-05 LAB
ANION GAP SERPL CALCULATED.3IONS-SCNC: 15 MMOL/L (ref 7–15)
BUN SERPL-MCNC: 18.9 MG/DL (ref 8–23)
CALCIUM SERPL-MCNC: 8.9 MG/DL (ref 8.8–10.2)
CHLORIDE SERPL-SCNC: 98 MMOL/L (ref 98–107)
CREAT SERPL-MCNC: 1.12 MG/DL (ref 0.67–1.17)
DEPRECATED HCO3 PLAS-SCNC: 27 MMOL/L (ref 22–29)
ERYTHROCYTE [DISTWIDTH] IN BLOOD BY AUTOMATED COUNT: 15.5 % (ref 10–15)
GFR SERPL CREATININE-BSD FRML MDRD: 66 ML/MIN/1.73M2
GLUCOSE BLDC GLUCOMTR-MCNC: 150 MG/DL (ref 70–99)
GLUCOSE BLDC GLUCOMTR-MCNC: 162 MG/DL (ref 70–99)
GLUCOSE BLDC GLUCOMTR-MCNC: 185 MG/DL (ref 70–99)
GLUCOSE BLDC GLUCOMTR-MCNC: 188 MG/DL (ref 70–99)
GLUCOSE BLDC GLUCOMTR-MCNC: 193 MG/DL (ref 70–99)
GLUCOSE SERPL-MCNC: 133 MG/DL (ref 70–99)
HCT VFR BLD AUTO: 42.5 % (ref 40–53)
HGB BLD-MCNC: 13.1 G/DL (ref 13.3–17.7)
LACTATE SERPL-SCNC: 2.3 MMOL/L (ref 0.7–2)
LACTATE SERPL-SCNC: 4.5 MMOL/L (ref 0.7–2)
MCH RBC QN AUTO: 30.9 PG (ref 26.5–33)
MCHC RBC AUTO-ENTMCNC: 30.8 G/DL (ref 31.5–36.5)
MCV RBC AUTO: 100 FL (ref 78–100)
PLATELET # BLD AUTO: 136 10E3/UL (ref 150–450)
POTASSIUM SERPL-SCNC: 4.2 MMOL/L (ref 3.4–5.3)
RBC # BLD AUTO: 4.24 10E6/UL (ref 4.4–5.9)
SODIUM SERPL-SCNC: 140 MMOL/L (ref 136–145)
WBC # BLD AUTO: 4.9 10E3/UL (ref 4–11)

## 2023-05-05 PROCEDURE — 36415 COLL VENOUS BLD VENIPUNCTURE: CPT | Performed by: HOSPITALIST

## 2023-05-05 PROCEDURE — 99231 SBSQ HOSP IP/OBS SF/LOW 25: CPT

## 2023-05-05 PROCEDURE — 250N000012 HC RX MED GY IP 250 OP 636 PS 637: Performed by: INTERNAL MEDICINE

## 2023-05-05 PROCEDURE — 85014 HEMATOCRIT: CPT | Performed by: INTERNAL MEDICINE

## 2023-05-05 PROCEDURE — 80048 BASIC METABOLIC PNL TOTAL CA: CPT | Performed by: INTERNAL MEDICINE

## 2023-05-05 PROCEDURE — 258N000003 HC RX IP 258 OP 636

## 2023-05-05 PROCEDURE — 250N000013 HC RX MED GY IP 250 OP 250 PS 637: Performed by: INTERNAL MEDICINE

## 2023-05-05 PROCEDURE — 120N000001 HC R&B MED SURG/OB

## 2023-05-05 PROCEDURE — 82962 GLUCOSE BLOOD TEST: CPT

## 2023-05-05 PROCEDURE — 999N000157 HC STATISTIC RCP TIME EA 10 MIN

## 2023-05-05 PROCEDURE — 83605 ASSAY OF LACTIC ACID: CPT | Performed by: HOSPITALIST

## 2023-05-05 PROCEDURE — 36415 COLL VENOUS BLD VENIPUNCTURE: CPT | Performed by: INTERNAL MEDICINE

## 2023-05-05 PROCEDURE — 250N000009 HC RX 250: Performed by: INTERNAL MEDICINE

## 2023-05-05 PROCEDURE — 99232 SBSQ HOSP IP/OBS MODERATE 35: CPT | Performed by: HOSPITALIST

## 2023-05-05 PROCEDURE — 258N000003 HC RX IP 258 OP 636: Performed by: INTERNAL MEDICINE

## 2023-05-05 PROCEDURE — 250N000011 HC RX IP 250 OP 636: Performed by: INTERNAL MEDICINE

## 2023-05-05 RX ORDER — LOPERAMIDE HCL 2 MG
2 CAPSULE ORAL EVERY 6 HOURS PRN
Status: DISCONTINUED | OUTPATIENT
Start: 2023-05-05 | End: 2023-05-08 | Stop reason: HOSPADM

## 2023-05-05 RX ORDER — NICOTINE POLACRILEX 4 MG
15-30 LOZENGE BUCCAL
Status: DISCONTINUED | OUTPATIENT
Start: 2023-05-05 | End: 2023-05-08 | Stop reason: HOSPADM

## 2023-05-05 RX ORDER — NITROGLYCERIN 0.4 MG/1
0.4 TABLET SUBLINGUAL EVERY 5 MIN PRN
Status: DISCONTINUED | OUTPATIENT
Start: 2023-05-05 | End: 2023-05-08 | Stop reason: HOSPADM

## 2023-05-05 RX ORDER — PANTOPRAZOLE SODIUM 40 MG/1
40 TABLET, DELAYED RELEASE ORAL 2 TIMES DAILY
Status: DISCONTINUED | OUTPATIENT
Start: 2023-05-05 | End: 2023-05-08 | Stop reason: HOSPADM

## 2023-05-05 RX ORDER — ONDANSETRON 4 MG/1
4 TABLET, ORALLY DISINTEGRATING ORAL EVERY 6 HOURS PRN
Status: DISCONTINUED | OUTPATIENT
Start: 2023-05-05 | End: 2023-05-08 | Stop reason: HOSPADM

## 2023-05-05 RX ORDER — ASPIRIN 81 MG/1
81 TABLET ORAL DAILY
Status: DISCONTINUED | OUTPATIENT
Start: 2023-05-05 | End: 2023-05-08 | Stop reason: HOSPADM

## 2023-05-05 RX ORDER — CARBOXYMETHYLCELLULOSE SODIUM 5 MG/ML
1 SOLUTION/ DROPS OPHTHALMIC
Status: DISCONTINUED | OUTPATIENT
Start: 2023-05-05 | End: 2023-05-08 | Stop reason: HOSPADM

## 2023-05-05 RX ORDER — DEXTROSE MONOHYDRATE 25 G/50ML
25-50 INJECTION, SOLUTION INTRAVENOUS
Status: DISCONTINUED | OUTPATIENT
Start: 2023-05-05 | End: 2023-05-08 | Stop reason: HOSPADM

## 2023-05-05 RX ORDER — AMOXICILLIN 250 MG
1 CAPSULE ORAL DAILY
Status: DISCONTINUED | OUTPATIENT
Start: 2023-05-05 | End: 2023-05-08 | Stop reason: HOSPADM

## 2023-05-05 RX ORDER — PROCHLORPERAZINE 25 MG
12.5 SUPPOSITORY, RECTAL RECTAL EVERY 12 HOURS PRN
Status: DISCONTINUED | OUTPATIENT
Start: 2023-05-05 | End: 2023-05-08 | Stop reason: HOSPADM

## 2023-05-05 RX ORDER — ONDANSETRON 2 MG/ML
4 INJECTION INTRAMUSCULAR; INTRAVENOUS EVERY 6 HOURS PRN
Status: DISCONTINUED | OUTPATIENT
Start: 2023-05-05 | End: 2023-05-08 | Stop reason: HOSPADM

## 2023-05-05 RX ORDER — ACETAMINOPHEN 325 MG/1
650 TABLET ORAL EVERY 4 HOURS PRN
Status: DISCONTINUED | OUTPATIENT
Start: 2023-05-05 | End: 2023-05-08 | Stop reason: HOSPADM

## 2023-05-05 RX ORDER — ALLOPURINOL 300 MG/1
300 TABLET ORAL EVERY MORNING
Status: DISCONTINUED | OUTPATIENT
Start: 2023-05-05 | End: 2023-05-08 | Stop reason: HOSPADM

## 2023-05-05 RX ORDER — SIMETHICONE 125 MG
125 TABLET,CHEWABLE ORAL 3 TIMES DAILY PRN
Status: DISCONTINUED | OUTPATIENT
Start: 2023-05-05 | End: 2023-05-08 | Stop reason: HOSPADM

## 2023-05-05 RX ORDER — CEFTRIAXONE 2 G/1
2 INJECTION, POWDER, FOR SOLUTION INTRAMUSCULAR; INTRAVENOUS EVERY 24 HOURS
Status: DISCONTINUED | OUTPATIENT
Start: 2023-05-05 | End: 2023-05-06

## 2023-05-05 RX ORDER — PAROXETINE 40 MG/1
40 TABLET, FILM COATED ORAL EVERY MORNING
Status: DISCONTINUED | OUTPATIENT
Start: 2023-05-05 | End: 2023-05-08 | Stop reason: HOSPADM

## 2023-05-05 RX ORDER — METHYLPREDNISOLONE SODIUM SUCCINATE 40 MG/ML
40 INJECTION, POWDER, LYOPHILIZED, FOR SOLUTION INTRAMUSCULAR; INTRAVENOUS EVERY 24 HOURS
Status: DISCONTINUED | OUTPATIENT
Start: 2023-05-05 | End: 2023-05-06

## 2023-05-05 RX ORDER — PROCHLORPERAZINE MALEATE 5 MG
5 TABLET ORAL EVERY 6 HOURS PRN
Status: DISCONTINUED | OUTPATIENT
Start: 2023-05-05 | End: 2023-05-08 | Stop reason: HOSPADM

## 2023-05-05 RX ORDER — IPRATROPIUM BROMIDE AND ALBUTEROL SULFATE 2.5; .5 MG/3ML; MG/3ML
1 SOLUTION RESPIRATORY (INHALATION) 3 TIMES DAILY
Status: DISCONTINUED | OUTPATIENT
Start: 2023-05-05 | End: 2023-05-06

## 2023-05-05 RX ORDER — LIDOCAINE 40 MG/G
CREAM TOPICAL
Status: DISCONTINUED | OUTPATIENT
Start: 2023-05-05 | End: 2023-05-05

## 2023-05-05 RX ORDER — FORMOTEROL FUMARATE DIHYDRATE 20 UG/2ML
20 SOLUTION RESPIRATORY (INHALATION) EVERY 12 HOURS
Status: DISCONTINUED | OUTPATIENT
Start: 2023-05-05 | End: 2023-05-08 | Stop reason: HOSPADM

## 2023-05-05 RX ORDER — PAROXETINE 10 MG/1
10 TABLET, FILM COATED ORAL EVERY MORNING
Status: DISCONTINUED | OUTPATIENT
Start: 2023-05-05 | End: 2023-05-08 | Stop reason: HOSPADM

## 2023-05-05 RX ORDER — LIDOCAINE 40 MG/G
CREAM TOPICAL
Status: DISCONTINUED | OUTPATIENT
Start: 2023-05-05 | End: 2023-05-08 | Stop reason: HOSPADM

## 2023-05-05 RX ORDER — AMOXICILLIN 250 MG
1 CAPSULE ORAL DAILY PRN
Status: DISCONTINUED | OUTPATIENT
Start: 2023-05-05 | End: 2023-05-08 | Stop reason: HOSPADM

## 2023-05-05 RX ORDER — ALBUTEROL SULFATE 0.83 MG/ML
2.5 SOLUTION RESPIRATORY (INHALATION)
Status: DISCONTINUED | OUTPATIENT
Start: 2023-05-05 | End: 2023-05-08 | Stop reason: HOSPADM

## 2023-05-05 RX ADMIN — CEFTRIAXONE SODIUM 2 G: 2 INJECTION, POWDER, FOR SOLUTION INTRAMUSCULAR; INTRAVENOUS at 21:53

## 2023-05-05 RX ADMIN — ALLOPURINOL 300 MG: 300 TABLET ORAL at 09:12

## 2023-05-05 RX ADMIN — INSULIN ASPART 1 UNITS: 100 INJECTION, SOLUTION INTRAVENOUS; SUBCUTANEOUS at 21:54

## 2023-05-05 RX ADMIN — IPRATROPIUM BROMIDE AND ALBUTEROL 1 PUFF: 20; 100 SPRAY, METERED RESPIRATORY (INHALATION) at 09:11

## 2023-05-05 RX ADMIN — INSULIN ASPART 1 UNITS: 100 INJECTION, SOLUTION INTRAVENOUS; SUBCUTANEOUS at 18:08

## 2023-05-05 RX ADMIN — IPRATROPIUM BROMIDE AND ALBUTEROL 1 PUFF: 20; 100 SPRAY, METERED RESPIRATORY (INHALATION) at 21:53

## 2023-05-05 RX ADMIN — SENNOSIDES AND DOCUSATE SODIUM 1 TABLET: 50; 8.6 TABLET ORAL at 09:11

## 2023-05-05 RX ADMIN — PANTOPRAZOLE SODIUM 40 MG: 40 TABLET, DELAYED RELEASE ORAL at 20:13

## 2023-05-05 RX ADMIN — IPRATROPIUM BROMIDE AND ALBUTEROL SULFATE 3 ML: .5; 3 SOLUTION RESPIRATORY (INHALATION) at 14:30

## 2023-05-05 RX ADMIN — PAROXETINE 40 MG: 40 TABLET, FILM COATED ORAL at 09:12

## 2023-05-05 RX ADMIN — ASPIRIN 81 MG: 81 TABLET, COATED ORAL at 10:20

## 2023-05-05 RX ADMIN — PAROXETINE HYDROCHLORIDE 10 MG: 10 TABLET, FILM COATED ORAL at 09:11

## 2023-05-05 RX ADMIN — METHYLPREDNISOLONE SODIUM SUCCINATE 40 MG: 40 INJECTION, POWDER, FOR SOLUTION INTRAMUSCULAR; INTRAVENOUS at 06:04

## 2023-05-05 RX ADMIN — SODIUM CHLORIDE, POTASSIUM CHLORIDE, SODIUM LACTATE AND CALCIUM CHLORIDE 500 ML: 600; 310; 30; 20 INJECTION, SOLUTION INTRAVENOUS at 20:00

## 2023-05-05 RX ADMIN — IPRATROPIUM BROMIDE AND ALBUTEROL 1 PUFF: 20; 100 SPRAY, METERED RESPIRATORY (INHALATION) at 18:06

## 2023-05-05 RX ADMIN — SODIUM CHLORIDE 500 ML: 9 INJECTION, SOLUTION INTRAVENOUS at 03:00

## 2023-05-05 RX ADMIN — FORMOTEROL FUMARATE DIHYDRATE 20 MCG: 20 SOLUTION RESPIRATORY (INHALATION) at 09:11

## 2023-05-05 RX ADMIN — ALBUTEROL SULFATE 2.5 MG: 2.5 SOLUTION RESPIRATORY (INHALATION) at 06:24

## 2023-05-05 RX ADMIN — IPRATROPIUM BROMIDE AND ALBUTEROL SULFATE 3 ML: .5; 3 SOLUTION RESPIRATORY (INHALATION) at 09:11

## 2023-05-05 RX ADMIN — PANTOPRAZOLE SODIUM 40 MG: 40 TABLET, DELAYED RELEASE ORAL at 09:13

## 2023-05-05 ASSESSMENT — ACTIVITIES OF DAILY LIVING (ADL)
ADLS_ACUITY_SCORE: 41
ADLS_ACUITY_SCORE: 35
ADLS_ACUITY_SCORE: 35
ADLS_ACUITY_SCORE: 41
ADLS_ACUITY_SCORE: 35
ADLS_ACUITY_SCORE: 41
ADLS_ACUITY_SCORE: 35

## 2023-05-05 NOTE — PROGRESS NOTES
RECEIVING UNIT ED HANDOFF REVIEW    ED Nurse Handoff Report was reviewed by: Maria D Merlos RN on May 5, 2023 at 5:14 PM

## 2023-05-05 NOTE — ED NOTES
Pt weaned off BIPAP and currently on 3 LPM/ NC with Spo2 at 91-93% and ETCo2 ~33-42; RR~18-22;    Small formed BM during turns.   Excoriation on coccyx. Mepilex applied.  Cardona draining freely.    Leslie Brower RN,.......................................... 5/5/2023   4:00 AM

## 2023-05-05 NOTE — ED NOTES
[facility]  ED Nurse Handoff Report    ED Chief complaint: Shortness of Breath and Generalized Weakness      ED Diagnosis:   Final diagnoses:   Acute on chronic respiratory failure with hypercapnia (H)   Urinary tract infection associated with catheterization of urinary tract, unspecified indwelling urinary catheter type, initial encounter (H)   Sepsis, due to unspecified organism, unspecified whether acute organ dysfunction present (H)       Code Status:  Admitting MD to assess.      Allergies: No Known Allergies    Patient Story: Pt weaned off BIPAP and currently on 3 LPM/ NC with Spo2 at 91-93% and ETCo2 ~33-42; RR~18-22  Focused Assessment:  Patient is alert and oriented x 3, calm and cooperative. VS are stable, skin is warm and dry, respirations are even and non-labored Patient denied chest pain, shortness of breath, dizziness, and nausea.       Treatments and/or interventions provided:   Medications   allopurinol (ZYLOPRIM) tablet 300 mg (300 mg Oral $Given 5/5/23 0912)   aspirin EC tablet 81 mg (81 mg Oral $Given 5/5/23 1020)   acetaminophen (TYLENOL) tablet 650 mg (has no administration in time range)   formoterol (PERFOROMIST) neb solution 20 mcg (20 mcg Nebulization $Given 5/5/23 0911)   ipratropium - albuterol 0.5 mg/2.5 mg/3 mL (DUONEB) neb solution 3 mL (3 mLs Nebulization $Given 5/5/23 1430)   ipratropium-albuterol (COMBIVENT RESPIMAT) inhaler 1 puff (1 puff Inhalation Not Given 5/5/23 1430)   loperamide (IMODIUM) capsule 2 mg (has no administration in time range)   pantoprazole (PROTONIX) EC tablet 40 mg (40 mg Oral $Given 5/5/23 0913)   PARoxetine (PAXIL) tablet 10 mg (10 mg Oral $Given 5/5/23 0911)   PARoxetine (PAXIL) tablet 40 mg (40 mg Oral $Given 5/5/23 0912)   senna-docusate (SENOKOT-S/PERICOLACE) 8.6-50 MG per tablet 1 tablet (1 tablet Oral $Given 5/5/23 0911)   senna-docusate (SENOKOT-S/PERICOLACE) 8.6-50 MG per tablet 1 tablet (has no administration in time range)    simethicone (MYLICON) chewable tablet 125 mg (has no administration in time range)   albuterol (PROVENTIL) neb solution 2.5 mg (2.5 mg Nebulization $Given 5/5/23 0624)   sodium chloride (PF) 0.9% PF flush 3 mL (3 mLs Intracatheter $Given 5/5/23 1431)   sodium chloride (PF) 0.9% PF flush 3 mL (has no administration in time range)   melatonin tablet 1 mg (has no administration in time range)   lidocaine 1 % 0.1-1 mL (has no administration in time range)   lidocaine (LMX4) cream (has no administration in time range)   nitroGLYcerin (NITROSTAT) sublingual tablet 0.4 mg (has no administration in time range)   ondansetron (ZOFRAN ODT) ODT tab 4 mg (has no administration in time range)     Or   ondansetron (ZOFRAN) injection 4 mg (has no administration in time range)   prochlorperazine (COMPAZINE) injection 5 mg (has no administration in time range)     Or   prochlorperazine (COMPAZINE) tablet 5 mg (has no administration in time range)     Or   prochlorperazine (COMPAZINE) suppository 12.5 mg (has no administration in time range)   cefTRIAXone (ROCEPHIN) 2 g vial to attach to  ml bag for ADULTS or NS 50 ml bag for PEDS (has no administration in time range)   No lozenges or gum should be given while patient on BIPAP/AVAPS/AVAPS AE (has no administration in time range)   carboxymethylcellulose PF (REFRESH PLUS) 0.5 % ophthalmic solution 1 drop (has no administration in time range)   Patient may continue current oral medications (has no administration in time range)   methylPREDNISolone sodium succinate (solu-MEDROL) injection 40 mg (40 mg Intravenous $Given 5/5/23 0604)   glucose gel 15-30 g (has no administration in time range)     Or   dextrose 50 % injection 25-50 mL (has no administration in time range)     Or   glucagon injection 1 mg (has no administration in time range)   insulin aspart (NovoLOG) injection (RAPID ACTING) (1 Units Subcutaneous Not Given 5/5/23 1424)   lactated ringers BOLUS 1,000 mL (0 mLs  Intravenous Stopped 5/5/23 0259)   iopamidol (ISOVUE-370) solution 79 mL (79 mLs Intravenous $Given 5/4/23 2154)   Saline Flush (100 mLs Intravenous $Given 5/4/23 2154)   cefTRIAXone (ROCEPHIN) 2 g vial to attach to  ml bag for ADULTS or NS 50 ml bag for PEDS (0 g Intravenous Stopped 5/4/23 2247)   lactated ringers BOLUS 1,000 mL (0 mLs Intravenous Stopped 5/5/23 0218)   0.9% sodium chloride BOLUS (0 mLs Intravenous Stopped 5/5/23 0400)       Patient's response to treatments and/or interventions: Stable    To be done/followed up on inpatient unit:  Monitor    Does this patient have any cognitive concerns?: Forgetful    Activity level - Baseline/Home:  Unknown  Activity Level - Current:   Unknown    Patient's Preferred language: English   Needed?: No    Isolation: None  Infection: Not Applicable  Patient tested for COVID 19 prior to admission: NO  Bariatric?: No    Vital Signs:   Vitals:    05/05/23 1235 05/05/23 1305 05/05/23 1335 05/05/23 1405   BP: 135/75 (!) 158/84 120/75 135/82   Pulse: 113 108 112 111   Resp: 19 12 17 17   Temp:       TempSrc:       SpO2: 94% 94% 92% 92%   Weight:           Cardiac Rhythm:     Was the PSS-3 completed:   Yes  What interventions are required if any?               Family Comments: No family present at this time.    OBS brochure/video discussed/provided to patient/family: Yes              Name of person given brochure if not patient: NA              Relationship to patient: NA    For the majority of the shift this patient's behavior was Green.   Behavioral interventions performed were  information and reassurance provided.  .    ED NURSE PHONE NUMBER: [phone]

## 2023-05-05 NOTE — ED PROVIDER NOTES
History     Chief Complaint:  Shortness of Breath and Generalized Weakness     The history is provided by the patient (and RN).     History limited due to acuity of patient condition.    Ron Gilmore is a 82 year old male with a history of hypertension, hyperlipidemia, stoke, COPD, and diabetes mellitus who presents with shortness of breath and generalized weakness. RN reports patient is present for shortness of breath and increased generalized weakness sent in by family, and reports that patient is normally on 2 liters of oxygen. Patient oxygen saturation was 88% upon arrival on 2 L nasal cannula requiring titration to 4 L.  Ron reports feeling confused currently.  He denies pain, and states that he was brought to the ED with his daughter. He states that he had a Cardona catheter replaced this past Monday. He reports he normally does not move. He denies chest pain, vomiting, and being on blood thinners.    Independent Historian:   RN    Review of External Notes: I reviewed the 4/14/23 discharge note summary.    ROS:  Review of Systems   Constitutional: Negative for chills and fever.   HENT: Negative for congestion and rhinorrhea.    Eyes: Negative for pain and visual disturbance.   Respiratory: Positive for shortness of breath.    Cardiovascular: Negative for chest pain and palpitations.   Gastrointestinal: Negative for nausea and vomiting.   Genitourinary: Negative for dysuria and hematuria.   Musculoskeletal: Negative for back pain and neck pain.   Skin: Negative for color change and pallor.   Neurological: Positive for weakness (generalized) and headaches.   Psychiatric/Behavioral: Positive for confusion.   All other systems reviewed and are negative.    Allergies:  No known drug allergies     Medications:    Zyloprim  Aspirin 81 mg  Lipitor  Vitamin B-12  Emollient  Formoterol  Lasix  Albuterol  Imodium  Hiprex  Lopressor  Protonix  Paxil  Mylicon  Flomax  Lopressor  Norco    Past Medical History:    Major  depressive disorder  BPH  Cataract  Cholelithiasis  COPD  Depression  Diabetes mellitus  Dyshidrotic foot dermatitis  Edema  Gout  Hyperlipidemia  Hypertension  Kidney stones  Lumbago  Lumbar disc displacement without myelopathy  Neuropathy  Muscle weakness  Diabetic neuropathy  Stroke  Spinal stenosis  Urinary retention with incomplete bladder emptying  UTI  Vasovagal episode  SIRS  CVA  Metabolic encephalopathy  Severe sepsis  Lactic acidosis  Febrile illness  Obstructive right sided ureteral stone resulting in hydronephrosis  Hemiparesis affecting left side  DVT of right peroneal vein  Gastrointestinal hemorrhage     Past Surgical History:    Appendectomy  Arthroscopy shoulder rotator cuff repair  Bilateral cataract surgery  Cholecystectomy   Colonoscopy x2  Cystoscopy  Cystoscopy, transurethral resection  EP loop recorder implant   EGD x2  Right eyelid surgery  IVC filter placement  Nephrostomy tube placement right  Right ureteral stent placement   Right hip replacement  Bilateral knee surgery  Laminectomy lumbar one level  Laser holmium lithotripsy ureter, right stent insertion  Tonsillectomy    Family History:    Father: Prostate cancer    Social History:  Patient presents to the ED via EMS alone.   PCP: Kalen Metcalf     Physical Exam     Patient Vitals for the past 24 hrs:   BP Temp Temp src Pulse Resp SpO2 Weight   05/05/23 0430 113/61 -- -- 92 20 93 % --   05/05/23 0400 121/61 -- -- 92 14 95 % --   05/05/23 0330 105/52 -- -- 93 21 92 % --   05/05/23 0323 -- -- -- -- -- 95 % --   05/05/23 0300 (!) 85/60 -- -- 91 13 95 % --   05/05/23 0250 (!) 89/57 -- -- 90 19 95 % --   05/05/23 0230 97/59 -- -- -- 18 94 % --   05/05/23 0215 91/57 -- -- 93 19 92 % --   05/05/23 0200 (!) 86/56 -- -- 93 17 92 % --   05/05/23 0145 (!) 80/54 -- -- 93 20 94 % --   05/05/23 0130 (!) 78/47 -- -- 93 16 93 % --   05/05/23 0115 (!) 85/50 -- -- 95 19 95 % --   05/05/23 0015 (!) 66/40 -- -- 98 22 94 % --   05/05/23 0000 (!) 81/53 --  -- 97 20 95 % --   05/04/23 2315 (!) 82/56 -- -- 101 20 92 % --   05/04/23 2247 (!) 87/52 -- -- 103 19 91 % --   05/04/23 2232 98/55 -- -- 100 21 94 % --   05/04/23 2227 -- -- -- -- -- 94 % --   05/04/23 2217 94/57 -- -- 99 20 95 % --   05/04/23 2147 -- -- -- 104 22 93 % --   05/04/23 2132 96/54 -- -- 105 21 94 % --   05/04/23 2117 (!) 79/55 -- -- 105 21 94 % --   05/04/23 2102 107/70 -- -- 105 23 94 % --   05/04/23 2049 101/67 -- -- 104 17 93 % --   05/04/23 2034 104/66 -- -- 103 22 93 % --   05/04/23 1956 121/74 99  F (37.2  C) Oral 101 22 95 % 108.9 kg (240 lb)        Physical Exam  Constitutional:       Appearance: Somnolent appearance.   HENT:      Head: Normocephalic and atraumatic.   Eyes:      Extraocular Movements: Extraocular movements intact.      Conjunctiva/sclera: Conjunctivae normal.   Cardiovascular:      Rate and Rhythm: Normal rate and regular rhythm.   Pulmonary:      Effort: Pulmonary effort is tachypneic. Not in respiratory distress.      Breath sounds: Clear to auscultation bilaterally.  Abdominal:      General: Abdomen is flat. There is no distension.      Palpations: Abdomen is soft.      Tenderness: There is no abdominal tenderness.   Musculoskeletal:      Cervical back: Normal range of motion. No rigidity.       Right lower leg: No edema.      Left lower leg: No edema.   Skin:     General: Skin is warm and dry.   Neurological:      General: Baseline left upper extremity and left lower extremity paralysis.     Mental Status: Somnolent, but wakes to command and follows simple commands and answering questions appropriately.   Psychiatric:         Mood and Affect: Mood normal.         Behavior: Behavior normal.    Emergency Department Course   ECG  ECG results from 05/04/23   EKG 12-lead, tracing only     Value    Systolic Blood Pressure     Diastolic Blood Pressure     Ventricular Rate 106    Atrial Rate 106    WV Interval 162    QRS Duration 132        QTc 486    P Axis 32    R AXIS -2     T Axis 12    Interpretation ECG      Sinus tachycardia  Indeterminate axis  Right bundle branch block  Abnormal ECG  When compared with ECG of 10-APR-2023 12:38,  Premature ventricular complexes are no longer Present  Confirmed by GENERATED REPORT, COMPUTER ([id number]),  DALLAS RODRIGUES ([id number]) on 5/4/2023 8:07:15 PM        Imaging:  CT Chest Pulmonary Embolism w Contrast   Final Result   IMPRESSION:   1.  No acute cardiovascular abnormality.      XR Chest Port 1 View   Final Result   IMPRESSION: Negative chest.         Report per radiology    Laboratory:  Labs Ordered and Resulted from Time of ED Arrival to Time of ED Departure   COMPREHENSIVE METABOLIC PANEL - Abnormal       Result Value    Sodium 139      Potassium 3.8      Chloride 94 (*)     Carbon Dioxide (CO2) 34 (*)     Anion Gap 11      Urea Nitrogen 19.3      Creatinine 1.31 (*)     Calcium 9.6      Glucose 146 (*)     Alkaline Phosphatase 92      AST 31      ALT 26      Protein Total 7.8      Albumin 3.5      Bilirubin Total 0.5      GFR Estimate 55 (*)    TROPONIN T, HIGH SENSITIVITY - Abnormal    Troponin T, High Sensitivity 34 (*)    CBC WITH PLATELETS AND DIFFERENTIAL - Abnormal    WBC Count 5.7      RBC Count 4.77      Hemoglobin 14.8      Hematocrit 47.8            MCH 31.0      MCHC 31.0 (*)     RDW 15.5 (*)     Platelet Count 160      % Neutrophils 68      % Lymphocytes 17      % Monocytes 12      % Eosinophils 2      % Basophils 0      % Immature Granulocytes 1      NRBCs per 100 WBC 0      Absolute Neutrophils 3.9      Absolute Lymphocytes 1.0      Absolute Monocytes 0.7      Absolute Eosinophils 0.1      Absolute Basophils 0.0      Absolute Immature Granulocytes 0.0      Absolute NRBCs 0.0     ISTAT GASES LACTATE VENOUS POCT - Abnormal    Lactic Acid POCT 1.6      Bicarbonate Venous POCT 39 (*)     O2 Sat, Venous POCT 19 (*)     pCO2V Venous POCT 71 (*)     pH Venous POCT 7.34      pO2 Venous POCT 16 (*)    ROUTINE UA WITH MICROSCOPIC  REFLEX TO CULTURE - Abnormal    Color Urine Brown (*)     Appearance Urine Cloudy (*)     Glucose Urine Negative      Bilirubin Urine Negative      Ketones Urine Negative      Specific Gravity Urine 1.020      Blood Urine Large (*)     pH Urine 6.5      Protein Albumin Urine 30 (*)     Urobilinogen Urine Normal      Nitrite Urine Negative      Leukocyte Esterase Urine Moderate (*)     Bacteria Urine Many (*)     Budding Yeast Urine Few (*)     Hyphal Yeast Urine Few (*)     Mucus Urine Present (*)     RBC Urine >182 (*)     WBC Urine >182 (*)     Hyaline Casts Urine 40 (*)    PROCALCITONIN - Abnormal    Procalcitonin 0.09 (*)    ISTAT GASES LACTATE VENOUS POCT - Abnormal    Lactic Acid POCT 2.1 (*)     Bicarbonate Venous POCT 41 (*)     O2 Sat, Venous POCT 29 (*)     pCO2V Venous POCT 69 (*)     pH Venous POCT 7.38      pO2 Venous POCT 20 (*)    INR - Normal    INR 1.04     NT PROBNP INPATIENT - Normal    N terminal Pro BNP Inpatient 99     LACTIC ACID WHOLE BLOOD - Normal    Lactic Acid 1.4     BASIC METABOLIC PANEL   CBC WITH PLATELETS   BLOOD CULTURE   BLOOD CULTURE   URINE CULTURE        Emergency Department Course & Assessments:     Interventions:  Medications   allopurinol (ZYLOPRIM) tablet 300 mg (has no administration in time range)   aspirin EC tablet 81 mg (has no administration in time range)   acetaminophen (TYLENOL) tablet 650 mg (has no administration in time range)   formoterol (PERFOROMIST) neb solution 20 mcg (has no administration in time range)   ipratropium - albuterol 0.5 mg/2.5 mg/3 mL (DUONEB) neb solution 3 mL (has no administration in time range)   ipratropium-albuterol (COMBIVENT RESPIMAT) inhaler 1 puff (has no administration in time range)   loperamide (IMODIUM) capsule 2 mg (has no administration in time range)   pantoprazole (PROTONIX) EC tablet 40 mg (has no administration in time range)   PARoxetine (PAXIL) tablet 10 mg (has no administration in time range)   PARoxetine (PAXIL) tablet  40 mg (has no administration in time range)   senna-docusate (SENOKOT-S/PERICOLACE) 8.6-50 MG per tablet 1 tablet (has no administration in time range)   senna-docusate (SENOKOT-S/PERICOLACE) 8.6-50 MG per tablet 1 tablet (has no administration in time range)   simethicone (MYLICON) chewable tablet 125 mg (has no administration in time range)   albuterol (PROVENTIL) neb solution 2.5 mg (has no administration in time range)   sodium chloride (PF) 0.9% PF flush 3 mL (has no administration in time range)   sodium chloride (PF) 0.9% PF flush 3 mL (has no administration in time range)   melatonin tablet 1 mg (has no administration in time range)   lidocaine 1 % 0.1-1 mL (has no administration in time range)   lidocaine (LMX4) cream (has no administration in time range)   nitroGLYcerin (NITROSTAT) sublingual tablet 0.4 mg (has no administration in time range)   ondansetron (ZOFRAN ODT) ODT tab 4 mg (has no administration in time range)     Or   ondansetron (ZOFRAN) injection 4 mg (has no administration in time range)   prochlorperazine (COMPAZINE) injection 5 mg (has no administration in time range)     Or   prochlorperazine (COMPAZINE) tablet 5 mg (has no administration in time range)     Or   prochlorperazine (COMPAZINE) suppository 12.5 mg (has no administration in time range)   cefTRIAXone (ROCEPHIN) 2 g vial to attach to  ml bag for ADULTS or NS 50 ml bag for PEDS (has no administration in time range)   No lozenges or gum should be given while patient on BIPAP/AVAPS/AVAPS AE (has no administration in time range)   carboxymethylcellulose PF (REFRESH PLUS) 0.5 % ophthalmic solution 1 drop (has no administration in time range)   Patient may continue current oral medications (has no administration in time range)   methylPREDNISolone sodium succinate (solu-MEDROL) injection 40 mg (has no administration in time range)   glucose gel 15-30 g (has no administration in time range)     Or   dextrose 50 % injection 25-50  mL (has no administration in time range)     Or   glucagon injection 1 mg (has no administration in time range)   insulin aspart (NovoLOG) injection (RAPID ACTING) (has no administration in time range)   lactated ringers BOLUS 1,000 mL (0 mLs Intravenous Stopped 5/5/23 0259)   iopamidol (ISOVUE-370) solution 79 mL (79 mLs Intravenous $Given 5/4/23 2154)   Saline Flush (100 mLs Intravenous $Given 5/4/23 2154)   cefTRIAXone (ROCEPHIN) 2 g vial to attach to  ml bag for ADULTS or NS 50 ml bag for PEDS (0 g Intravenous Stopped 5/4/23 2247)   lactated ringers BOLUS 1,000 mL (0 mLs Intravenous Stopped 5/5/23 0218)   0.9% sodium chloride BOLUS (500 mLs Intravenous $New Bag 5/5/23 0300)        Independent Interpretation (X-rays, CTs, rhythm strip):  See ED Course    Consultations/Discussion of Management or Tests:  ED Course as of 05/05/23 0502   u May 04, 2023   1957 EKG 12-lead, tracing only  Sinus tachycardia.  Rate of 106.  Normal LA.  Right bundle branch block.  Normal QTc.  No acute ST elevation or depression as compared with 4/10/2023 EKG.   2015 I obtained history and examined the patient as noted above.    2109 XR Chest Port 1 View  On my independent septation, there is right middle to lower lung focal opacity/infiltrate that appears similar as compared with prior chest x-ray.  No mediastinal widening.  No pneumothorax.   2135 I consulted with pharmacy regarding appropriate antibiotic initiation given recent hospitalization and history of VRE.   2245 CT Chest Pulmonary Embolism w Contrast  Neg PE/Pneumonia   2245 Creatinine(!): 1.31  AIDA   2326 I discussed patient with Dr. Ordonez who [place] [facility] [place]       Social Determinants of Health affecting care:   None    Disposition:  The patient was admitted to the hospital under the care of Dr. Ordonez.     Impression & Plan    CMS Diagnoses:   The patient has signs of Severe Sepsis        If one the following conditions is present, a 30 mL/kg  bolus is recommended as part of the 6 hour bundle (IBW can be used for BMI >30, or document refusal/contraindication):      1.   Initial hypotension  defined as 2 bps < 90 or map < 65 in the 6hrs before or 3hrs after time zero.     2.  Lactate >4.      The patient has signs of Severe Sepsis as evidenced by:    1. 2 SIRS criteria, AND  2. Suspected infection, AND   3. Organ dysfunction: Lactic Acidosis with value >2.0    Time severe sepsis diagnosis confirmed: 21 as this was the time when Lactate resulted, and the level was > 2.0    3 Hour Severe Sepsis Bundle Completion:    1. Initial Lactic Acid Result:   Recent Labs   Lab Test 23  2013   LACT 1.4 2.1* 1.6     2. Blood Cultures before Antibiotics: Yes  3. Broad Spectrum Antibiotics Administered:  yes       Anti-infectives (From admission through now)    Start     Dose/Rate Route Frequency Ordered Stop    23 2210  cefTRIAXone (ROCEPHIN) 2 g vial to attach to  ml bag for ADULTS or NS 50 ml bag for PEDS         2 g  over 30 Minutes Intravenous ONCE 23 2247          4. Is initial hypotension present?     No (IV fluid bolus NOT required). IV Fluid volume administered: 2L                    Severe Sepsis reassessment:  1. Repeat Lactic Acid Level within 6 hours of time zero:   2. MAP>65 after initial IVF bolus, will continue to monitor fluid status and vital signs    Medical Decision Makin-year-old male as described above presents to the emergency department for increased shortness of breath and confusion.  Patient hemodynamically stable at time evaluation.  Afebrile.  However, patient somnolent upon arrival with increased work of breathing.  Bedside VBG demonstrates PCO2 levels in the 70s and was reported to have initial arrival saturation in the upper 80s on home 2 L nasal requiring titration up to 4 L in the ER.  Given combination of increased somnolence, CO2 retention, work of  breathing, and increased oxygen requirement, patient was placed on BiPAP.  Patient notes immediate improvement in symptoms while on BiPAP.  Differential diagnosis considered includes, but not limited to, COPD exacerbation, pulmonary embolism, pneumonia, sepsis, UTI, and CHF exacerbation.  Cardiac work-up ordered.  Sepsis evaluation.  Given tachycardia, hypoxia, and otherwise clear lung sounds to auscultation, we will obtain CT PE for evaluation for pulm embolism and pneumonia. Discussed care plan with patient who voiced understanding and agreement with plan.  Answered all questions.  Additional work-up and orders as listed in chart.    Please refer to ED course above for details on the patient's treatment course and any changes or updates in care plan beyond my initial evaluation and MDM.    Critical Care:     I have determined that Ron Gilmore is critically ill with high probability of imminent, life threatening deterioration that could result in multi-organ failure.     Total Critical Care time was approximately 45 minutes for this patient exclusive of separately billable procedures, treating other patients, and teaching time.       Critical care services included obtaining a history, examining the patient, pulse oximetry, ordering and review of studies, arranging urgent treatment with development of a management plan, evaluation of patient's response to treatment, frequent reassessment, and discussions with other providers. He is critically ill but stabilized upon admission.       Please see MDM section and the rest of the note for further information on patient assessment and treatment.      Diagnosis:    ICD-10-CM    1. Acute on chronic respiratory failure with hypercapnia (H)  J96.22       2. Urinary tract infection associated with catheterization of urinary tract, unspecified indwelling urinary catheter type, initial encounter (H)  T83.511A     N39.0       3. Sepsis, due to unspecified organism,  unspecified whether acute organ dysfunction present (H)  A41.9            Discharge Medications:  New Prescriptions    No medications on file        Scribe Disclosure:  I, Kamla Lyn, am serving as a scribe at 8:33 PM on 5/4/2023 to document services personally performed by Jeb Silva DO based on my observations and the provider's statements to me.   5/4/2023   Jeb Silva DO Yeh, Ferris, DO  05/05/23 0459       Jeb Silva DO  05/05/23 0501       Jeb Silva DO  05/05/23 0502

## 2023-05-05 NOTE — ED TRIAGE NOTES
[facility]  ED Arrival Note      Means of Arrival: EMS  Comes from: Nursing Home    Story: Hx of COPD. Presents with increased SOB and generalized weakness.         EMS/PD Interventions: PIV and Oxygen  EMS Medications: N/A    Meets Stroke Criteria? No  Meets Trauma Criteria? No  Shock Index: <0.8      Directed to: Main ED  Belongings: In room locker             Triage Assessment     Row Name 05/04/23 1957       Triage Assessment (Adult)    Airway WDL WDL       Respiratory WDL    Respiratory WDL X       Skin Circulation/Temperature WDL    Skin Circulation/Temperature WDL WDL       Cardiac WDL    Cardiac WDL X       Peripheral/Neurovascular WDL    Peripheral Neurovascular WDL WDL       Cognitive/Neuro/Behavioral WDL    Cognitive/Neuro/Behavioral WDL WDL

## 2023-05-05 NOTE — PLAN OF CARE
Goal Outcome Evaluation:  Summary:  Admitted w/ SOB & generalized weakness, found to have UTI and hypercarbia Hx of COPD, CVA, HF.  DATE & TIME: 5/5/23, 4346-0869  Cognitive Concerns/ Orientation : Alert to self only. Calm and cooperative. Confused at times.  BEHAVIOR & AGGRESSION TOOL COLOR: Green  ABNL VS/O2: VSS, except tachy, and on 3L O2, sats 94-96%  MOBILITY: Lift, turn and repo, hx of stroke w/ left sided weakness  PAIN MANAGMENT: Denies  DIET: Mod Carb  BOWEL/BLADDER: Cardona in place, good output, pt unsure when last BM was  ABNL LAB/BG: bg 185, Hgb 13.1  DRAIN/DEVICES: PIV R.arm x2 SL.   TELEMETRY RHYTHM: n\/a  SKIN: Excoriation on buttock  TESTS/PROCEDURES: None  D/C DAY/GOALS/PLACE: TBD  OTHER IMPORTANT INFO: On contact precautions for VRE.

## 2023-05-05 NOTE — ED NOTES
Bed: ED20  Expected date:   Expected time:   Means of arrival:   Comments:  Saira 1 82M generalized weakness

## 2023-05-05 NOTE — PROVIDER NOTIFICATION
MD Notification    Notified Person: MD    Notified Person Name: Dr. Marcos    Notification Date/Time:  5/5/23 6840    Notification Interaction: Vocera    Purpose of Notification: EMMA Sampson in 612 has no diet ordered. Not sure if you want him NPO or not. He is no longer on Bipap. Thanks     Can you enter mod carb for me? Thanks     Orders Received: Verbal Mod carb diet order    Comments:

## 2023-05-05 NOTE — PROGRESS NOTES
Pt was on Bipap 10/5 in ED for few hours. Weaned off Bipap and currently on 2-3L NC.     Lisandra Beckett, RT

## 2023-05-05 NOTE — PROGRESS NOTES
[facility]    Medicine Progress Note - Hospitalist Service    Date of Admission:  5/4/2023    Assessment & Plan   Ron Gilmore is a 82 year old male with a history of COPD (2L), Htn, CVA, HFpEF who is admitted on 5/4/2023 with shortness of breath and weakness, found to have hypercarbia and UTI.      Acute on chronic hypercarbic respiratory failure with hypoxia  O2 dependent COPD with exacerbation - 2L chronically  HFpEF - EF 55-60% on TTE 4/23  Recent admission for pneumonia - 4/3-9 and 4/10-14  This admission CT of the chest is negative for acute findings including PE, pneumonia or volume overload. Does have some increased wheezing consistent with possible COPD exacerbation. Haven't definitively seen encephalopathy but wonder if could have some decreased respiratory drive from weakness related to UTI.   - weaned off bipap in the early morning hours of 5/5 - now ok to go to med floor  - solumedrol 40mg daily - now switching do pred po short course starting 5/6  - continue scheduled and prn nebs  - wean to chronic 2L O2 as able  - monitor for signs of infection or volume overload  - holding lasix given mild hypotension, possibly resume 5/6 or sooner if necessary and BP ok     AIDA - resolving  baseline creatinine about 0.9-1, up to 1.3 on admission. May be slightly volume deplete  Mild hypotension  - hold metoprolol and lasix for now  - IVF now stopped  - 5/5 creat 1.1  - follow bmp     UTI: associated with indwelling chirinos cathter  - continue ceftriaxone  - follow up urine culture     Mild troponin elevation: chronically elevated over months. No chest pain or other new cardiac symptoms. TTE in April with preserved EF and no WMA.   - monitor for symptoms     Hypertension  Dyslipidemia  Previous CVA  - continue PTA ASA, hold metoprolol for now given mild hypotension  - resume lipitor at discharge     Depression: paxil     DM: appears to be diet controlled. Blood sugars likely to rise with  steroids.   - initiate sliding scale     Gout: allopurinol     GERD: PPI        Diet:  npo until off bipap, mod carb  DVT Prophylaxis: Pneumatic Compression Devices  Cardona Catheter: Not present  Lines: None     Cardiac Monitoring: None  Code Status:   DNR DNI, pre arrest intubation ok    Clinically Significant Risk Factors Present on Admission                 # Acute Kidney Injury, unspecified: based on a >150% or 0.3 mg/dL increase in last creatinine compared to past 90 day average, will monitor renal function      # DMII: A1C = 6.9 % (Ref range: <5.7 %) within past 6 months    # Obesity: Estimated body mass index is 32.55 kg/m  as calculated from the following:    Height as of 4/10/23: 1.829 m (6').    Weight as of this encounter: 108.9 kg (240 lb).           Disposition Plan      Expected Discharge Date: 05/06/2023                  Kiet Marcos MD  Hospitalist Service  [facility]  Securely message with [employer] (more info)  Text page via [employer] Paging/Directory   ______________________________________________________________________    Interval History   Care assumed today. Seen and examined in ED midday. A little sleepy but able to relay that he is feeling improvement in his breathing. No chest pain. No fevers.    Physical Exam   Vital Signs: Temp: 99  F (37.2  C) Temp src: Oral BP: 128/70 Pulse: 103   Resp: 21 SpO2: 91 % O2 Device: Oxymask Oxygen Delivery: 4 LPM  Weight: 240 lbs 0 oz      Gen: NAD, pleasant  HEENT: EOMI, MMM  Resp: no focal crackles, coarse, no wheezes, no increased work of resp  CV: S1S2 heard, reg rhythm, reg rate  Abdo: soft, nontender, nondistended, bowel sounds present  Ext: calves nontender, well perfused  Neuro: aa, conversant, oriented to self, year, president, needed reminder of location, CN grossly intact, no facial asymmetry        Medical Decision Making       44 MINUTES SPENT BY ME on the date of service doing chart review, history, exam, documentation &  further activities per the note.      Data   Recent Labs   Lab 05/05/23  0908 05/05/23  0731 05/04/23 2011   WBC  --  4.9 5.7   HGB  --  13.1* 14.8   MCV  --  100 100   PLT  --  136* 160   INR  --   --  1.04   NA  --  140 139   POTASSIUM  --  4.2 3.8   CHLORIDE  --  98 94*   CO2  --  27 34*   BUN  --  18.9 19.3   CR  --  1.12 1.31*   ANIONGAP  --  15 11   RAJ  --  8.9 9.6   * 133* 146*   ALBUMIN  --   --  3.5   PROTTOTAL  --   --  7.8   BILITOTAL  --   --  0.5   ALKPHOS  --   --  92   ALT  --   --  26   AST  --   --  31

## 2023-05-05 NOTE — PHARMACY-ADMISSION MEDICATION HISTORY
Pharmacist Admission Medication History    Admission medication history is complete. The information provided in this note is only as accurate as the sources available at the time of the update.    Medication reconciliation/reorder completed by provider prior to medication history? No    Information Source(s): Facility ([facility]/) medication list/MAR ([facility]) via in-person    Pertinent Information:      Changes made to PTA medication list:    Added: None    Deleted: None    Changed: None    Medication Affordability:  Not including over the counter (OTC) medications, was there a time in the past 12 months when you did not take your medications as prescribed because of cost?: Unable to Assess    Allergies reviewed with patient and updates made in EHR: unable to assess    Medication History Completed By: ANTONIO WOODRUFF RP 5/4/2023 10:42 PM    Prior to Admission medications    Medication Sig Last Dose Taking? Auth Provider Long Term End Date   acetaminophen (TYLENOL) 325 MG tablet Take 650 mg by mouth every 4 hours as needed for mild pain as needed for pain/fever Max dose 3000mg/24hr Unknown Yes Reported, Patient     allopurinol (ZYLOPRIM) 300 MG tablet Take 300 mg by mouth every morning 5/4/2023 at 0900 Yes Unknown, Entered By History     aspirin (ASA) 81 MG EC tablet Take 1 tablet (81 mg) by mouth daily 5/4/2023 at 0900 Yes Rg Tobin,      atorvastatin (LIPITOR) 10 MG tablet Take 10 mg by mouth every morning 5/4/2023 at 0900 Yes Unknown, Entered By History No    cyanocobalamin (VITAMIN B-12) 500 MCG tablet Take 500 mcg by mouth every morning 5/4/2023 at 0900 Yes Unknown, Entered By History     Emollient (AMLACTIN ULTRA EX) Apply topically daily as needed TO FEET Unknown Yes Reported, Patient     formoterol (PERFOROMIST) 20 MCG/2ML neb solution Take 2 mLs (20 mcg) by nebulization every 12 hours for 60 days 5/4/2023 at 1000 Yes Romulo Cavanaugh MD Yes 6/13/23   furosemide (LASIX) 20 MG  tablet Take 1.5 tablets (30 mg) by mouth daily 5/4/2023 at 0900 Yes Sue Estevez MD Yes    ipratropium - albuterol 0.5 mg/2.5 mg/3 mL (DUONEB) 0.5-2.5 (3) MG/3ML neb solution Take 1 vial by nebulization daily as needed for shortness of breath, wheezing or cough Unknown Yes Unknown, Entered By History No    ipratropium - albuterol 0.5 mg/2.5 mg/3 mL (DUONEB) 0.5-2.5 (3) MG/3ML neb solution Take 1 vial by nebulization 3 times daily 0900, 1400, 2100 5/4/2023 Yes Unknown, Entered By History No    ipratropium-albuterol (COMBIVENT RESPIMAT)  MCG/ACT inhaler Inhale 1 puff into the lungs 4 times daily 0900, 1200 ,1600 ,2000 5/4/2023 Yes Reported, Patient No    loperamide (IMODIUM) 2 MG capsule Take 2 mg by mouth every 6 hours as needed for diarrhea Unknown Yes Unknown, Entered By History     methenamine hippurate (HIPREX) 1 g tablet Take 1 g by mouth 2 times daily 5/4/2023 at 0900 Yes Unknown, Entered By History     metoprolol tartrate (LOPRESSOR) 25 MG tablet Take 0.5 tablets (12.5 mg) by mouth 2 times daily 5/4/2023 at 0900 Yes Romulo Cavanaugh MD Yes    pantoprazole (PROTONIX) 40 MG EC tablet Take 40 mg by mouth 2 times daily 5/4/2023 at 0900 Yes Unknown, Entered By History     PARoxetine (PAXIL) 10 MG tablet Take 10 mg by mouth every morning Take with 40mg tablet to equal 50mg total 5/4/2023 at 0900 Yes Unknown, Entered By History No    PARoxetine (PAXIL) 40 MG tablet Take 40 mg by mouth every morning Take with 10mg tablet to equal 50mg total 5/4/2023 at 0900 Yes Unknown, Entered By History No    polyvinyl alcohol (LIQUIFILM TEARS) 1.4 % ophthalmic solution Instill 2 drops into affected eyes twice daily as needed Unknown Yes Unknown, Entered By History  8/25/23   senna-docusate (SENOKOT-S/PERICOLACE) 8.6-50 MG tablet Take 1 tablet by mouth daily 5/4/2023 at 0900 Yes Unknown, Entered By History     senna-docusate (SENOKOT-S/PERICOLACE) 8.6-50 MG tablet Take 1 tablet by mouth daily as needed for constipation  Unknown Yes Unknown, Entered By History     simethicone (MYLICON) 125 MG chewable tablet Take 125 mg by mouth 3 times daily as needed for intestinal gas Unknown Yes Unknown, Entered By History     Skin Protectants, Misc. (LANTISEPTIC SKIN PROTECTANT EX) Externally apply topically 2 times daily as needed Apply a thin film to vincent area/buttocks Unknown Yes Unknown, Entered By History     Skin Protectants, Misc. (LANTISEPTIC SKIN PROTECTANT EX) Externally apply topically 2 times daily Apply a thin film to vincent area/buttocks Unknown Yes Unknown, Entered By History     polyethylene glycol (MIRALAX) 17 GM/Dose powder Take 17 g by mouth daily  Patient not taking: Reported on 5/4/2023 Not Taking  Romulo Cavanaugh MD

## 2023-05-05 NOTE — ED NOTES
Hand off to fidel BLACK.  Leslie Brower RN,.......................................... 5/5/2023   7:26 AM

## 2023-05-05 NOTE — ED NOTES
Assumed care at this time.    Pt appears tolerating BIPAP~28% w/ Spo2 at 95% and with intermittent dry cough.  Other VSS.    Leslie Brower RN,.......................................... 5/5/2023   3:30 AM

## 2023-05-05 NOTE — H&P
[facility]    History and Physical - Hospitalist Service       Date of Admission:  5/4/2023     Assessment & Plan      Ron Gilmore is a 82 year old male with a history of COPD (2L), Htn, CVA, HFpEF who is admitted on 5/4/2023 with shortness of breath and weakness, found to have hypercarbia and UTI.     Acute on chronic hypercarbic respiratory failure with hypoxia  O2 dependent COPD with exacerbation - 2L chronically  HFpEF - EF 55-60% on TTE 4/23  Recent admission for pneumonia - 4/3-9 and 4/10-14  This admission CT of the chest is negative for acute findings including PE, pneumonia or volume overload. Does have some increased wheezing consistent with possible COPD exacerbation. Haven't definitively seen encephalopathy but wonder if could have some decreased respiratory drive from weakness related to UTI.   -continue Bipap with likely weaning in the morning  -solumedrol 40mg daily - switch to short course prednisone when off Bipap  -continue scheduled and prn nebs  -wean to chronic 2L O2 as able  -monitor for signs of infection or volume overload  -holding lasix given mild hypotension    AIDA: baseline creatinine about 0.9-1, up to 1.3 on admission. May be slightly volume deplete  Mild hypotension  -hold metoprolol and lasix for now  -continue judicious IVF    UTI: associated with indwelling chirinos cathter  -continue ceftriaxone  -follow up urine culture    Mild troponin elevation: chronically elevated over months. No chest pain or other new cardiac symptoms. TTE in April with preserved EF and no WMA.   -monitor for symptoms    Hypertension  Dyslipidemia  Previous CVA  -continue PTA ASA, hold metoprolol for now given mild hypotension  -resume lipitor at discharge    Depression: paxil    DM: appears to be diet controlled. Blood sugars likely to rise with steroids.   -initiate sliding scale    Gout: allopurinol    GERD: PPI       Diet:   NPO while on Bipap  DVT Prophylaxis: Pneumatic  Compression Devices  Chirinos Catheter: Not present  Lines: None     Cardiac Monitoring: None  Code Status:   DNR/DNI    Disposition: IMC status, expect 2-3 days in the hospital for treatment of UTI and weaning off bipap for stability in respiratory status    Clinically Significant Risk Factors Present on Admission                 # Acute Kidney Injury, unspecified: based on a >150% or 0.3 mg/dL increase in last creatinine compared to past 90 day average, will monitor renal function      # Non-Invasive mechanical ventilation: current O2 Device: BiPAP/CPAP  # Acute hypoxic respiratory failure: continue supplemental O2 as needed    # DMII: A1C = 6.9 % (Ref range: <5.7 %) within past 6 months      # Obesity: Estimated body mass index is 32.55 kg/m  as calculated from the following:    Height as of 4/10/23: 1.829 m (6').    Weight as of this encounter: 108.9 kg (240 lb).               Kiet Ordonez DO  Hospitalist Service  [facility]  Securely message with [employer] (more info)  Text page via [employer] Paging/Directory     ______________________________________________________________________    Chief Complaint   Shortness of breath and weakness    History is obtained from the patient    History of Present Illness   Ron Gilmore is a 82 year old male who presents with weakness and shortness of breath. He has O2 dependent COPD and normally on 2L but was mildly hypoxic on presentation. He has been more weak recently though it sounds like is nonambulatory at baseline. No chest pain. He has a slight cough at times. He has noted he is wheezy, more than typical. He was recently admitted for respiratory failure and pneumonia with COPD exacerbation initially from 4/3-9 then 4/10-14. Unclear if he has had sick contacts since leaving the hospital. No reported fevers at home. He did have a chirinos catheter on Monday. Doesn't sound like there have been reports of significant confusion or somnolence. In the ED  he had a VBG showing hypercarbia and started on Bipap which also helped his oxygenation. CXR and CT chest negative for any acute findings. No PE, no peumonia, no evidence of volume overload. He does have evidence of UTI so started on ceftriaxone. Being admitted to [facility].       Past Medical History    Past Medical History:   Diagnosis Date     BPH (benign prostatic hyperplasia)      Cataract      Cholelithiasis      COPD (chronic obstructive pulmonary disease) (H)      Depression      Diabetes mellitus     Type 2     Dyshidrotic foot dermatitis      Edema      Gout      Hyperlipidemia      Hypertension      Infection due to 2019 novel coronavirus 5/1/2021     Kidney stones     Bladder Stones     Lumbago      Lumbar disc displacement without myelopathy      Muscle weakness      Neuropathy, diabetic (H)      Obesity      Spinal stenosis      Stroke (H)     with residual effects- weakness LUE> LLE     Unsteady gait      Urinary retention with incomplete bladder emptying      UTI (urinary tract infection)      Vasovagal episode        Past Surgical History   Past Surgical History:   Procedure Laterality Date     APPENDECTOMY OPEN       ARTHROSCOPY SHOULDER ROTATOR CUFF REPAIR       cataracts Bilateral      CHOLECYSTECTOMY       COLONOSCOPY  1986     COLONOSCOPY N/A 5/29/2021    Procedure: COLONOSCOPY;  Surgeon: Kofi Davis MD;  Location:  GI     CYSTOSCOPY  10/19/2011    Procedure:CYSTOSCOPY; CYSTOSCOPY, BLADDER STONE REMOVAL; Surgeon:ROB SAWYER; Location: OR     CYSTOSCOPY, TRANSURETHRAL RESECTION (TUR) PROSTATE, COMBINED N/A 2/21/2018    Procedure: COMBINED CYSTOSCOPY, TRANSURETHRAL RESECTION (TUR) PROSTATE;  COMBINED CYSTOSCOPY, TRANSURETHRAL RESECTION (TUR) PROSTATE ;  Surgeon: Rob Sawyer MD;  Location:  OR     EP LOOP RECORDER IMPLANT N/A 1/20/2020    Procedure: EP Loop Recorder Implant;  Surgeon: Evgeny Parisi MD;  Location:  HEART CARDIAC CATH LAB     ESOPHAGOSCOPY, GASTROSCOPY,  DUODENOSCOPY (EGD), COMBINED N/A 5/28/2021    Procedure: ESOPHAGOGASTRODUODENOSCOPY (EGD);  Surgeon: Aurora Waterman MD;  Location:  GI     ESOPHAGOSCOPY, GASTROSCOPY, DUODENOSCOPY (EGD), DILATATION, COMBINED N/A 9/24/2022    Procedure: ESOPHAGOGASTRODUODENOSCOPY, WITH DILATION;  Surgeon: Kofi Davis MD;  Location:  GI     EYE SURGERY      right lid surgery      IR IVC FILTER PLACEMENT  5/24/2021     IR NEPHROSTOMY TUBE PLACEMENT RIGHT  3/9/2021     IR URETERAL STENT PLACEMENT RIGHT  3/16/2021     JOINT REPLACEMENT Right     HIP     KNEE SURGERY Bilateral      LAMINECTOMY LUMBAR ONE LEVEL       LASER HOLMIUM LITHOTRIPSY URETER(S), INSERT STENT, COMBINED Right 4/14/2021    Procedure: CYSTOSCOPY, BLADDER STONE REMOVAL, RIGHT URETEROSCOPY, HOLMIUM LASER LITHOTRIPSY, AND RIGHT STENT REMOVAL, RIGHT RETROGRADE;  Surgeon: Rob Sullivan MD;  Location:  OR     TONSILLECTOMY         Prior to Admission Medications   Prior to Admission Medications   Prescriptions Last Dose Informant Patient Reported? Taking?   Emollient (AMLACTIN ULTRA EX) Unknown Nursing Home Yes Yes   Sig: Apply topically daily as needed TO FEET   PARoxetine (PAXIL) 10 MG tablet 5/4/2023 at 0900 Nursing Home Yes Yes   Sig: Take 10 mg by mouth every morning Take with 40mg tablet to equal 50mg total   PARoxetine (PAXIL) 40 MG tablet 5/4/2023 at 0900 Nursing Home Yes Yes   Sig: Take 40 mg by mouth every morning Take with 10mg tablet to equal 50mg total   Skin Protectants, Misc. (LANTISEPTIC SKIN PROTECTANT EX) Unknown Nursing Home Yes Yes   Sig: Externally apply topically 2 times daily as needed Apply a thin film to vincent area/buttocks   Skin Protectants, Misc. (LANTISEPTIC SKIN PROTECTANT EX) Unknown Nursing Home Yes Yes   Sig: Externally apply topically 2 times daily Apply a thin film to vincent area/buttocks   acetaminophen (TYLENOL) 325 MG tablet Unknown Nursing Home Yes Yes   Sig: Take 650 mg by mouth every 4 hours as needed for mild  pain as needed for pain/fever Max dose 3000mg/24hr   allopurinol (ZYLOPRIM) 300 MG tablet 5/4/2023 at 0900 [facility] Home Yes Yes   Sig: Take 300 mg by mouth every morning   aspirin (ASA) 81 MG EC tablet 5/4/2023 at 0900 [facility] No Yes   Sig: Take 1 tablet (81 mg) by mouth daily   atorvastatin (LIPITOR) 10 MG tablet 5/4/2023 at 0900 [facility] Home Yes Yes   Sig: Take 10 mg by mouth every morning   cyanocobalamin (VITAMIN B-12) 500 MCG tablet 5/4/2023 at 0900 [facility] Home Yes Yes   Sig: Take 500 mcg by mouth every morning   formoterol (PERFOROMIST) 20 MCG/2ML neb solution 5/4/2023 at 1000  No Yes   Sig: Take 2 mLs (20 mcg) by nebulization every 12 hours for 60 days   furosemide (LASIX) 20 MG tablet 5/4/2023 at 0900 [facility] No Yes   Sig: Take 1.5 tablets (30 mg) by mouth daily   ipratropium - albuterol 0.5 mg/2.5 mg/3 mL (DUONEB) 0.5-2.5 (3) MG/3ML neb solution Unknown Nursing Home Yes Yes   Sig: Take 1 vial by nebulization daily as needed for shortness of breath, wheezing or cough   ipratropium - albuterol 0.5 mg/2.5 mg/3 mL (DUONEB) 0.5-2.5 (3) MG/3ML neb solution 5/4/2023 [facility] Home Yes Yes   Sig: Take 1 vial by nebulization 3 times daily 0900, 1400, 2100   ipratropium-albuterol (COMBIVENT RESPIMAT)  MCG/ACT inhaler 5/4/2023 [facility] Yes Yes   Sig: Inhale 1 puff into the lungs 4 times daily 0900, 1200 ,1600 ,2000   loperamide (IMODIUM) 2 MG capsule Unknown Nursing Home Yes Yes   Sig: Take 2 mg by mouth every 6 hours as needed for diarrhea   methenamine hippurate (HIPREX) 1 g tablet 5/4/2023 at 0900 [facility] Home Yes Yes   Sig: Take 1 g by mouth 2 times daily   metoprolol tartrate (LOPRESSOR) 25 MG tablet 5/4/2023 at 0900 [facility] No Yes   Sig: Take 0.5 tablets (12.5 mg) by mouth 2 times daily   pantoprazole (PROTONIX) 40 MG EC tablet 5/4/2023 at 0900 Nursing Home Yes Yes   Sig: Take 40 mg by mouth 2 times daily   polyethylene glycol (MIRALAX) 17 GM/Dose powder Not Taking Nursing Home No No    Sig: Take 17 g by mouth daily   Patient not taking: Reported on 5/4/2023   polyvinyl alcohol (LIQUIFILM TEARS) 1.4 % ophthalmic solution Unknown  Yes Yes   Sig: Instill 2 drops into affected eyes twice daily as needed   senna-docusate (SENOKOT-S/PERICOLACE) 8.6-50 MG tablet 5/4/2023 at 0900 Nursing Home Yes Yes   Sig: Take 1 tablet by mouth daily   senna-docusate (SENOKOT-S/PERICOLACE) 8.6-50 MG tablet Unknown Nursing Home Yes Yes   Sig: Take 1 tablet by mouth daily as needed for constipation   simethicone (MYLICON) 125 MG chewable tablet Unknown Nursing Home Yes Yes   Sig: Take 125 mg by mouth 3 times daily as needed for intestinal gas      Facility-Administered Medications: None        Review of Systems    The 10 point Review of Systems is negative other than noted in the HPI or here.      Physical Exam   Vital Signs: Temp: 99  F (37.2  C) Temp src: Oral BP: (!) 87/52 Pulse: 103   Resp: 19 SpO2: 91 % O2 Device: BiPAP/CPAP Oxygen Delivery: 4 LPM  Weight: 240 lbs 0 oz    General: lying in bed, bipap in place, appears weak and deconditioned  CV: RRR, no m/r/g  Pulm: wheezing noted, no crackles  GI: +BS, soft, NT/ND      Medical Decision Making             Data     I have personally reviewed the following data over the past 24 hrs:    5.7  \   14.8   / 160     139 94 (L) 19.3 /  146 (H)   3.8 34 (H) 1.31 (H) \       ALT: 26 AST: 31 AP: 92 TBILI: 0.5   ALB: 3.5 TOT PROTEIN: 7.8 LIPASE: N/A       Trop: 34 (H) BNP: 99       Procal: 0.09 (H) CRP: N/A Lactic Acid: 2.1 (H)       INR:  1.04 PTT:  N/A   D-dimer:  N/A Fibrinogen:  N/A       Imaging results reviewed over the past 24 hrs:   Recent Results (from the past 24 hour(s))   XR Chest Port 1 View    Narrative    EXAM: XR CHEST PORT 1 VIEW  LOCATION: [facility]  DATE/TIME: 5/4/2023 9:11 PM CDT    INDICATION: SOB  COMPARISON: 04/10/2023      Impression    IMPRESSION: Negative chest.   CT Chest Pulmonary Embolism w Contrast    Narrative    EXAM:  CT CHEST PULMONARY EMBOLISM W CONTRAST  LOCATION: [facility]  DATE/TIME: 5/4/2023 10:09 PM CDT    INDICATION: Shortness of breath, tachycardia hypoxia  COMPARISON: CXR 05/04/2023 CTA 04/03/2023  TECHNIQUE: CT chest pulmonary angiogram during arterial phase injection of IV contrast. Multiplanar reformats and MIP reconstructions were performed. Dose reduction techniques were used.   CONTRAST: 79 mL Isovue 370    FINDINGS:  ANGIOGRAM CHEST: Pulmonary arteries are normal caliber and negative for pulmonary emboli. Arch bovine configuration. Atherosclerosis. Thoracic aorta is negative for dissection. No CT evidence of right heart strain.    LUNGS AND PLEURA: Hypoaeration. Moderate centrilobular emphysema. Dependent atelectasis. Eventration hemidiaphragms. No pleural collections.    MEDIASTINUM/AXILLAE: Prominent mediastinal and hilar nodes likely reactive. Heart size normal.    CORONARY ARTERY CALCIFICATION: Mild.    UPPER ABDOMEN: Cholecystectomy.    MUSCULOSKELETAL: Degenerative changes. Dresser screws right humeral neck.      Impression    IMPRESSION:  1.  No acute cardiovascular abnormality.

## 2023-05-06 ENCOUNTER — APPOINTMENT (OUTPATIENT)
Dept: SPEECH THERAPY | Facility: CLINIC | Age: 83
DRG: 189 | End: 2023-05-06
Attending: HOSPITALIST
Payer: MEDICARE

## 2023-05-06 LAB
BACTERIA UR CULT: NORMAL
BASE EXCESS BLDV CALC-SCNC: 10 MMOL/L (ref -7.7–1.9)
GLUCOSE BLDC GLUCOMTR-MCNC: 125 MG/DL (ref 70–99)
GLUCOSE BLDC GLUCOMTR-MCNC: 140 MG/DL (ref 70–99)
GLUCOSE BLDC GLUCOMTR-MCNC: 145 MG/DL (ref 70–99)
GLUCOSE BLDC GLUCOMTR-MCNC: 167 MG/DL (ref 70–99)
GLUCOSE BLDC GLUCOMTR-MCNC: 201 MG/DL (ref 70–99)
HCO3 BLDV-SCNC: 36 MMOL/L (ref 21–28)
LACTATE SERPL-SCNC: 1.3 MMOL/L (ref 0.7–2)
O2/TOTAL GAS SETTING VFR VENT: 2 %
PCO2 BLDV: 56 MM HG (ref 40–50)
PH BLDV: 7.42 [PH] (ref 7.32–7.43)
PO2 BLDV: 45 MM HG (ref 25–47)

## 2023-05-06 PROCEDURE — 999N000157 HC STATISTIC RCP TIME EA 10 MIN

## 2023-05-06 PROCEDURE — 82803 BLOOD GASES ANY COMBINATION: CPT | Performed by: INTERNAL MEDICINE

## 2023-05-06 PROCEDURE — 120N000001 HC R&B MED SURG/OB

## 2023-05-06 PROCEDURE — 99232 SBSQ HOSP IP/OBS MODERATE 35: CPT | Performed by: HOSPITALIST

## 2023-05-06 PROCEDURE — 36415 COLL VENOUS BLD VENIPUNCTURE: CPT | Performed by: INTERNAL MEDICINE

## 2023-05-06 PROCEDURE — 250N000011 HC RX IP 250 OP 636: Performed by: INTERNAL MEDICINE

## 2023-05-06 PROCEDURE — 94640 AIRWAY INHALATION TREATMENT: CPT

## 2023-05-06 PROCEDURE — 258N000003 HC RX IP 258 OP 636

## 2023-05-06 PROCEDURE — 250N000009 HC RX 250: Performed by: INTERNAL MEDICINE

## 2023-05-06 PROCEDURE — 92610 EVALUATE SWALLOWING FUNCTION: CPT | Mod: GN

## 2023-05-06 PROCEDURE — 250N000013 HC RX MED GY IP 250 OP 250 PS 637: Performed by: HOSPITALIST

## 2023-05-06 PROCEDURE — 250N000013 HC RX MED GY IP 250 OP 250 PS 637: Performed by: INTERNAL MEDICINE

## 2023-05-06 PROCEDURE — 36415 COLL VENOUS BLD VENIPUNCTURE: CPT

## 2023-05-06 PROCEDURE — 83605 ASSAY OF LACTIC ACID: CPT

## 2023-05-06 RX ORDER — IPRATROPIUM BROMIDE AND ALBUTEROL SULFATE 2.5; .5 MG/3ML; MG/3ML
1 SOLUTION RESPIRATORY (INHALATION) EVERY 4 HOURS PRN
Status: DISCONTINUED | OUTPATIENT
Start: 2023-05-06 | End: 2023-05-08 | Stop reason: HOSPADM

## 2023-05-06 RX ORDER — PREDNISONE 20 MG/1
40 TABLET ORAL DAILY
Status: DISCONTINUED | OUTPATIENT
Start: 2023-05-07 | End: 2023-05-08 | Stop reason: HOSPADM

## 2023-05-06 RX ADMIN — METHYLPREDNISOLONE SODIUM SUCCINATE 40 MG: 40 INJECTION, POWDER, FOR SOLUTION INTRAMUSCULAR; INTRAVENOUS at 06:55

## 2023-05-06 RX ADMIN — SENNOSIDES AND DOCUSATE SODIUM 1 TABLET: 50; 8.6 TABLET ORAL at 08:50

## 2023-05-06 RX ADMIN — IPRATROPIUM BROMIDE AND ALBUTEROL 1 PUFF: 20; 100 SPRAY, METERED RESPIRATORY (INHALATION) at 21:54

## 2023-05-06 RX ADMIN — FORMOTEROL FUMARATE DIHYDRATE 20 MCG: 20 SOLUTION RESPIRATORY (INHALATION) at 08:08

## 2023-05-06 RX ADMIN — PAROXETINE 40 MG: 40 TABLET, FILM COATED ORAL at 08:50

## 2023-05-06 RX ADMIN — SODIUM CHLORIDE, POTASSIUM CHLORIDE, SODIUM LACTATE AND CALCIUM CHLORIDE 250 ML: 600; 310; 30; 20 INJECTION, SOLUTION INTRAVENOUS at 00:15

## 2023-05-06 RX ADMIN — INSULIN ASPART 1 UNITS: 100 INJECTION, SOLUTION INTRAVENOUS; SUBCUTANEOUS at 03:26

## 2023-05-06 RX ADMIN — ALLOPURINOL 300 MG: 300 TABLET ORAL at 08:49

## 2023-05-06 RX ADMIN — ASPIRIN 81 MG: 81 TABLET, COATED ORAL at 08:49

## 2023-05-06 RX ADMIN — FUROSEMIDE 30 MG: 20 TABLET ORAL at 16:11

## 2023-05-06 RX ADMIN — IPRATROPIUM BROMIDE AND ALBUTEROL 1 PUFF: 20; 100 SPRAY, METERED RESPIRATORY (INHALATION) at 12:52

## 2023-05-06 RX ADMIN — PANTOPRAZOLE SODIUM 40 MG: 40 TABLET, DELAYED RELEASE ORAL at 08:49

## 2023-05-06 RX ADMIN — METOPROLOL TARTRATE 12.5 MG: 25 TABLET, FILM COATED ORAL at 21:03

## 2023-05-06 RX ADMIN — PAROXETINE HYDROCHLORIDE 10 MG: 10 TABLET, FILM COATED ORAL at 08:49

## 2023-05-06 RX ADMIN — PANTOPRAZOLE SODIUM 40 MG: 40 TABLET, DELAYED RELEASE ORAL at 21:04

## 2023-05-06 RX ADMIN — IPRATROPIUM BROMIDE AND ALBUTEROL 1 PUFF: 20; 100 SPRAY, METERED RESPIRATORY (INHALATION) at 08:49

## 2023-05-06 RX ADMIN — METOPROLOL TARTRATE 12.5 MG: 25 TABLET, FILM COATED ORAL at 13:59

## 2023-05-06 ASSESSMENT — ACTIVITIES OF DAILY LIVING (ADL)
ADLS_ACUITY_SCORE: 53
ADLS_ACUITY_SCORE: 46
ADLS_ACUITY_SCORE: 46
ADLS_ACUITY_SCORE: 49
ADLS_ACUITY_SCORE: 53
ADLS_ACUITY_SCORE: 41
ADLS_ACUITY_SCORE: 53
ADLS_ACUITY_SCORE: 53
ADLS_ACUITY_SCORE: 49
ADLS_ACUITY_SCORE: 53

## 2023-05-06 NOTE — PROGRESS NOTES
[facility]    Medicine Progress Note - Hospitalist Service    Date of Admission:  5/4/2023    Assessment & Plan   Ron Gilmore is a 82 year old male with a history of COPD (2L), Htn, CVA, HFpEF who is admitted on 5/4/2023 with shortness of breath and weakness, found to have hypercarbia and UTI.      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 Does have some increased wheezing consistent with possible COPD exacerbation. Haven't definitively seen encephalopathy but wonder if could have some decreased respiratory drive from weakness related to UTI.   - weaned off bipap in the early morning hours of 5/5 - now ok to go to med floor  - solumedrol 40mg daily - now switching do pred po short course starting 5/7 (3 more days)  - continue scheduled and prn nebs  - wean to chronic 2L O2 as able  - monitor for signs of infection or volume overload  - resume pta lasix 5/6, PTA metop also resumed with holding parameters (5/6)  - back on chronic 2L  - from [facility]? - possibly home in 1-2 days (CT/SW consulted and appreciated)     Technically severe sepsis PM 5/5 with lactic acidosis 4.5   RRT called for this - afebrile at the time with stable VS. Not convinced that this was truly sepsis especially as urine culture has now came back with mixed patty. Could have been lactic acidosis in relation to multiple albuterol treatments. LA normalized with IVF.     Mild-moderate oral pharyngeal dysphagia  Appreciate RN and SLP efforts - patient now on correct modified diet.  - continue ST per SLP guidance    AIDA - resolving  baseline creatinine about 0.9-1, up to 1.3 on admission. May be slightly volume deplete  Mild  hypotension  - hold metoprolol and lasix for now  - IVF now stopped  - 5/5 creat 1.1  - follow bmp 5/7     Concern of UTI, mixed patty on culture   associated with indwelling chirinos cathter  - ceftriaxone stopped, received 2 doses  - monitor     Mild troponin elevation: chronically elevated over months. No chest pain or other new cardiac symptoms. TTE in April with preserved EF and no WMA.   - monitor for symptoms     Hypertension  Dyslipidemia  Previous CVA  - continue PTA ASA, PTA metop and lasix held initially, resume 5/6  - resume lipitor at discharge     Depression: paxil     DM: appears to be diet controlled. Blood sugars likely to rise with steroids.   - initiate sliding scale     Gout: allopurinol     GERD: PPI           Diet: Moderate Consistent Carb (60 g CHO per Meal) Diet    DVT Prophylaxis: Pneumatic Compression Devices  Chirinos Catheter: PRESENT, indication: Retention  Lines: None     Cardiac Monitoring: None  Code Status: No CPR- Do NOT Intubate      Clinically Significant Risk Factors                       # DMII: A1C = 6.9 % (Ref range: <5.7 %) within past 6 months, PRESENT ON ADMISSION  # Obesity: Estimated body mass index is 32.55 kg/m  as calculated from the following:    Height as of 4/10/23: 1.829 m (6').    Weight as of this encounter: 108.9 kg (240 lb)., PRESENT ON ADMISSION         Disposition Plan     Expected Discharge Date: 05/07/2023                  Kiet Marcos MD  Hospitalist Service  [facility]  Securely message with [employer] (more info)  Text page via [facility] Paging/Directory   ______________________________________________________________________    Interval History   Seen and examined midday.  Patient doing much better than yesterday.  Stable on his baseline 2 L nasal cannula oxygen.  Denies any pain, fevers, or chills.  Occasional cough noted.    Physical Exam   Vital Signs: Temp: 98  F (36.7  C) Temp src: Oral BP: 125/73 Pulse: 87   Resp: 16 SpO2: 93 % O2  Device: Nasal cannula Oxygen Delivery: 2 LPM  Weight: 240 lbs 0 oz    Gen: NAD, pleasant  HEENT: EOMI, MMM  Resp: no focal crackles,  no wheezes, no increased work of resp  CV: S1S2 heard, reg rhythm, reg rate  Abdo: soft, nontender, nondistended, bowel sounds present  Ext: calves nontender, well perfused  Neuro: aa, conversant, bedbound at baseline, moving BUE, CN grossly intact, no facial asymmetry      Medical Decision Making       39 MINUTES SPENT BY ME on the date of service doing chart review, history, exam, documentation & further activities per the note.      Data   Recent Labs   Lab 05/06/23  1219 05/06/23  0634 05/06/23  0236 05/05/23  0908 05/05/23  0731 05/04/23 2011   WBC  --   --   --   --  4.9 5.7   HGB  --   --   --   --  13.1* 14.8   MCV  --   --   --   --  100 100   PLT  --   --   --   --  136* 160   INR  --   --   --   --   --  1.04   NA  --   --   --   --  140 139   POTASSIUM  --   --   --   --  4.2 3.8   CHLORIDE  --   --   --   --  98 94*   CO2  --   --   --   --  27 34*   BUN  --   --   --   --  18.9 19.3   CR  --   --   --   --  1.12 1.31*   ANIONGAP  --   --   --   --  15 11   RAJ  --   --   --   --  8.9 9.6   * 125* 140*   < > 133* 146*   ALBUMIN  --   --   --   --   --  3.5   PROTTOTAL  --   --   --   --   --  7.8   BILITOTAL  --   --   --   --   --  0.5   ALKPHOS  --   --   --   --   --  92   ALT  --   --   --   --   --  26   AST  --   --   --   --   --  31    < > = values in this interval not displayed.

## 2023-05-06 NOTE — PROGRESS NOTES
Bedside Swallow Evaluation      05/06/23 1300   Appointment Info   Signing Clinician's Name / Credentials (SLP) Doris Glass MA, CCC-SLP   General Information   Onset of Illness/Injury or Date of Surgery 05/04/23   Referring Physician Kiet Marcos MD   Patient/Family Therapy Goal Statement (SLP) Did not state   Pertinent History of Current Problem Per MD note: Ron Gilmore is a 82 year old male with a history of COPD (2L), Htn, CVA, HFpEF who is admitted on 5/4/2023 with shortness of breath and weakness, found to have hypercarbia and UTI.   General Observations Had just pulled IV and with nurses cleaning up. Pt pleasant and cooperative during evaluation   Type of Evaluation   Type of Evaluation Swallow Evaluation   Oral Motor   Oral Musculature anomalies present   Structural Abnormalities none present   Mucosal Quality dry   Dentition (Oral Motor)   Dentition (Oral Motor) edentulous;dental appliance/dentures   Comment, Dentition (Oral Motor) Dentures at bedside and pt reports only sometimes wears them   Facial Symmetry (Oral Motor)   Facial Symmetry (Oral Motor) left side impairment   Left Side Facial Asymmetry minimal impairment   Comment, Facial Symmetry (Oral Motor) Baseline L side deficits   Lip Function (Oral Motor)   Comment, Lip Function (Oral Motor) L side retraction impairment   Tongue Function (Oral Motor)   Comment, Tongue Function (Oral Motor) L side impaired lateralization   Jaw Function (Oral Motor)   Jaw Function (Oral Motor) WNL   Cough/Swallow/Gag Reflex (Oral Motor)   Volitional Throat Clear/Cough (Oral Motor) impaired;reduced strength   Volitional Swallow (Oral Motor) WNL   Vocal Quality/Secretion Management (Oral Motor)   Comment, Vocal Quality/Secretion Management (Oral Motor) Occasional instances of breathy speech   General Swallowing Observations   Current Diet/Method of Nutritional Intake (General Swallowing Observations, NIS) regular diet;thin liquids (level 0)   Respiratory  "Support (General Swallowing Observations) nasal cannula   Past History of Dysphagia Patient has a significant dysphagia history and is familiar to [facility] SLP department. Most recently discharged on 4/14 with the following recommendations: Per evaluating SLP \"Clinical and video swallow study completed: recommend minced and moist diet with mildly thick liquids by spoon, 1:1 feeding assistance while in hospital.  Patient reporting sudden onset of worsened voice, throat pain, and swallowing difficulty associated with nebulizer treatment when liquid medication dripped into his mouth and was possibly aspirated prior to his previous admission.  There was no aspiration during swallow study today, however, with significant swallow delay, drinking liquids in large quantity with head back position (drinking from water/pop bottle at home) may pose aspiration risk with spillover from pyriform sinus.\"    Per pt, been on a modified diet at living facility but \"most of the food lands on my chest, I can't pick it up\". He reports the board said mod cho and he thought that was his modified diet. He remembers that he needs thickened liquids but does not drink them from a spoon. \"Please don't make me do that again\".      Swallowing Evaluation Clinical swallow evaluation;Videofluoroscopic swallow study (VFSS)   Clinical Swallow Evaluation   Feeding Assistance frequent cues/help required   Adaptive Eating Utensils Likes to use pop bottle to drink d/t small top   Clinical Swallow Evaluation Textures Trialed mildly thick liquids;pureed   Clinical Swallow Eval: Mildly Thick Liquids   Mode of Presentation cup;spoon;self-fed;fed by clinician   Volume Presented 2 oz   Oral Phase premature pharyngeal entry   Pharyngeal Phase impaired;coughing/choking   Diagnostic Statement Pt tolerated teaspoons of mildly thick liquids. Initially tolerated 2 sips mildly thick via cup. On last sip, large coughing episode   Clinical Swallow Evaluation: Puree Solid " Texture Trial   Mode of Presentation, Puree spoon;fed by clinician   Volume of Puree Presented 2 oz   Oral Phase, Puree WFL   Pharyngeal Phase, Puree intact   Diagnostic Statement no difficulties   Esophageal Phase of Swallow   Patient reports or presents with symptoms of esophageal dysphagia No   Swallowing Recommendations   Diet Consistency Recommendations minced & moist (level 5);mildly thick liquids (level 2)   Supervision Level for Intake 1:1 supervision needed   Mode of Delivery Recommendations slow rate of intake;no straws;liquids via spoon only   Swallowing Maneuver Recommendations alternate food and liquid intake   Monitoring/Assistance Required (Eating/Swallowing) monitor for cough or change in vocal quality with intake   Recommended Feeding/Eating Techniques (Swallow Eval) maintain upright sitting position for eating;encourage use of dentures;provide assist with feeding   Medication Administration Recommendations, Swallowing (SLP) Whole with liquids or purees   Instrumental Assessment Recommendations instrumental evaluation not recommended at this time   Comment, Swallowing Recommendations Tolerated mildly thick liquids via spoon, large coughing episode with mildly thick by cup. Dentures at bedside but not in (pt reports he does not like them: makes him talk funny, fall out when he is sleeping). Tolerated purees without difficulty. Requires assistance with feeding   General Therapy Interventions   Planned Therapy Interventions Dysphagia Treatment   Dysphagia treatment Modified diet education;Instruction of safe swallow strategies   Clinical Impression   Criteria for Skilled Therapeutic Interventions Met (SLP Eval) Yes, treatment indicated   SLP Diagnosis mild-moderate oral pharyngeal dysphagia   Risks & Benefits of therapy have been explained evaluation/treatment results reviewed;care plan/treatment goals reviewed;risks/benefits reviewed;current/potential barriers reviewed;participants voiced agreement  with care plan;participants included;patient   Clinical Impression Comments Patient presents with ongoing mild-moderate oral pharyngeal dysphagia. Familiar to SLP department from previous admissions. Initially mod cho with thin liquids diet placed and RN observed coughing with thin liquids. Recommend continue previous feeding plan: minced/moist with mildly thick liquids via spoon. 1-1 assist due to difficulty self-feeding and concern for not following liquid by spoon, limit distractions, slow rate.   SLP Total Evaluation Time   Eval: oral/pharyngeal swallow function, clinical swallow Minutes (12248) 25   SLP Discharge Planning   SLP Plan diet tolenace, mildly by cup   SLP Discharge Recommendation Long term care facility   SLP Rationale for DC Rec Below baseline swallow function, recommend SLP services for transition to least restrictive diet when appropriate.   SLP Brief overview of current status  clinical swallow evaluation completed. Recommend continue previous feeding plan: minced/moist with mildly thick liquids via spoon. 1-1 assist due to difficulty self-feeding and concern for not following liquid by spoon, limit distractions, slow rate.   Total Session Time   Total Session Time (sum of timed and untimed services) 25

## 2023-05-06 NOTE — PROVIDER NOTIFICATION
MD Notification    Notified Person: MD    Notified Person Name:  Donnie Andrade     Notification Date/Time: 5/5/23,     Notification Interaction:  Vocera    Purpose of Notification: Hi, just an FYI, just received a critical lactic of 4.5 for M.B in 612    Hello! Does patient need to be evaluated?     I just called an RRT for the lactic. Per protocol since its over 4.0    Orders Received:    Comments:

## 2023-05-06 NOTE — PLAN OF CARE
Summary:  Admitted w/ SOB & generalized weakness, found to have UTI and hypercarbia Hx of COPD, CVA, HF.  DATE & TIME: 5/5/23, NOC  Cognitive Concerns/ Orientation : Alert to self only. Calm and cooperative. Confused at times.  BEHAVIOR & AGGRESSION TOOL COLOR: Green  ABNL VS/O2: VSS, except tachy, and on 2L O2, sats 94-96%, Lactic acid 1.3  MOBILITY: Lift, turn and repo, hx of stroke w/ left sided weakness  PAIN MANAGMENT: Denies  DIET: Mod Carb  BOWEL/BLADDER: Cardona in place, good output, pt unsure when last BM was  ABNL LAB/BG: bg 162, 140, 125, Hgb 13.1  DRAIN/DEVICES: PIV R.arm x2 SL.   TELEMETRY RHYTHM: n\/a  SKIN: Excoriation on buttock  TESTS/PROCEDURES: None  D/C DAY/GOALS/PLACE: TBD  OTHER IMPORTANT INFO: On contact precautions for VRE.

## 2023-05-06 NOTE — PLAN OF CARE
Summary: Admitted w/ SOB & generalized weakness, found to have UTI and hypercarbia Hx of COPD, CVA, HF.  DATE & TIME: 05/06/23 7-3 pm   Cognitive Concerns/ Orientation : A & O x 3, disorientated to situation. Calm and cooperative. Garbled speech at times. Hx of prior stroke.   BEHAVIOR & AGGRESSION TOOL COLOR: Green  ABNL VS/O2: VSS on 2L nasal cannula except tachycardia.   MOBILITY: Total care. Lift, turn and repo, hx of stroke w/ left sided weakness.   PAIN MANAGMENT: Denies  DIET: Mod carb/Minced and moist/ mildly thick liquids by spoon only. Total feed.   BOWEL/BLADDER: Cardona in place, low urine output. MD restarted Lasix.   ABNL LAB/BG:   DRAIN/DEVICES: PIV SL and Cardona  SKIN: Pale. Scattered bruising. Generalized edema +1-2. Excoriation/wounds on buttock, mepilex changed. Pressure injuries behind ears (chronic). Heels red blanchable.   TESTS/PROCEDURES: None  D/C DAY/GOALS/PLACE: 1-2 days back to LTC  OTHER IMPORTANT INFO: Contact precautions maintained. ROSS. SP assessed pt needs modified diet and one on one feeding. Take pills whole in apple sauce. Stable this shift.

## 2023-05-06 NOTE — CODE/RAPID RESPONSE
Sepsis Evaluation     I was called to see Ron Gilmore due to Lactic acid level of 4.5. He is known to have an infection. Positive UA on 5/4/2023, currently on ceftriaxone 2g Q24h.    PHYSICAL EXAM  Vital Signs:  Temp: 97.8  F (36.6  C) Temp src: Oral BP: 122/76 Pulse: 105   Resp: 18 SpO2: 96 % O2 Device: Nasal cannula Oxygen Delivery: 3 LPM    General: in no acute distress  Mental Status: AAOx4, drowsy but easily wakes.    Remainder of physical exam is significant for pallor, cool extremities with weak pulses.    DATA  Lactic Acid   Date Value Ref Range Status   05/05/2023 4.5 (HH) 0.7 - 2.0 mmol/L Final   05/04/2023 1.4 0.7 - 2.0 mmol/L Final   05/18/2021 1.7 0.7 - 2.1 mmol/L Final   05/10/2021 1.7 0.7 - 2.0 mmol/L Final     Lactic Acid POCT   Date Value Ref Range Status   05/04/2023 2.1 (H) <=2.0 mmol/L Final   05/04/2023 1.6 <=2.0 mmol/L Final     Lactate for Sepsis Protocol   Date Value Ref Range Status   05/30/2021 1.2 0.7 - 2.0 mmol/L Final   05/19/2021 1.3 0.7 - 2.0 mmol/L Final       ASSESSMENT AND PLAN  Ron Gilmore meets SIRS criteria AND has a lactate >2 or other evidence of acute organ damage.  These vital signs, lab and physical exam findings constitute a diagnosis of SEVERE SEPSIS, based on: Lactate resulted, and the level was > 2.0          Anti-infectives (From now, onward)    Start     Dose/Rate Route Frequency Ordered Stop    05/05/23 2200  cefTRIAXone (ROCEPHIN) 2 g vial to attach to  ml bag for ADULTS or NS 50 ml bag for PEDS         2 g  over 30 Minutes Intravenous EVERY 24 HOURS 05/05/23 0408          Current antibiotic coverage is appropriate for source of infection.    3 Hour Severe Sepsis Bundle Completion:  1. Initial Lactic Acid result shown above. Repeat lactic acid ordered by reflex for 3 hours from initial collection.  2. Blood Cultures before Antibiotics: Yes  3. Broad Spectrum Antibiotics Administered: no  4. Is hypotension present? No (IV fluid bolus NOT required). IV  Fluid volume administered: 500 mL in addition to 500 mL received in ED.    Disposition: The patient will remain on the current unit. We will continue to monitor this patient closely.  ELIZABETH Hernandez CNP  05/05/23, 7:38 PM    2300 Addendum:  Lactic acid reduced to 2.3 after fluid bolus of 500 mL, patient otherwise remains asymptomatic for sepsis. Give additional 250 mL fluid bolus and repeat lactic acid in 3 hours.    ELIZABETH Orosco, CNP  Hospitalist - [facility]  Text me on the [employer] gisele for a textback  Text page with web based paging for a callback      Sepsis Criteria   Sepsis: The body's generalized inflammatory state as a response to an infection. Sepsis Predictive Model includes >80 variable to alert to potential sepsis.  Severe Sepsis: Sepsis plus one or more variables of acute organ dysfunction (Note: lactic acid >2 or acute encephalopathy each qualify as organ dysfunction)  Septic Shock: Sepsis AND hypotension despite adequate volume resuscitation with crystalloid or lactic acid >=4  Note: HYPOTENSION is defined as 2 BP readings measured 3 hrs apart that have a SBP <90, MAP <65, or decrease >40 mmHg, occurring 6 hrs before or after t-zero

## 2023-05-07 LAB
ANION GAP SERPL CALCULATED.3IONS-SCNC: 10 MMOL/L (ref 7–15)
BUN SERPL-MCNC: 25.9 MG/DL (ref 8–23)
CALCIUM SERPL-MCNC: 9.3 MG/DL (ref 8.8–10.2)
CHLORIDE SERPL-SCNC: 100 MMOL/L (ref 98–107)
CREAT SERPL-MCNC: 0.98 MG/DL (ref 0.67–1.17)
DEPRECATED HCO3 PLAS-SCNC: 33 MMOL/L (ref 22–29)
ERYTHROCYTE [DISTWIDTH] IN BLOOD BY AUTOMATED COUNT: 15.1 % (ref 10–15)
GFR SERPL CREATININE-BSD FRML MDRD: 78 ML/MIN/1.73M2
GLUCOSE BLDC GLUCOMTR-MCNC: 113 MG/DL (ref 70–99)
GLUCOSE BLDC GLUCOMTR-MCNC: 140 MG/DL (ref 70–99)
GLUCOSE BLDC GLUCOMTR-MCNC: 145 MG/DL (ref 70–99)
GLUCOSE BLDC GLUCOMTR-MCNC: 161 MG/DL (ref 70–99)
GLUCOSE BLDC GLUCOMTR-MCNC: 191 MG/DL (ref 70–99)
GLUCOSE SERPL-MCNC: 109 MG/DL (ref 70–99)
HCT VFR BLD AUTO: 41 % (ref 40–53)
HGB BLD-MCNC: 12.5 G/DL (ref 13.3–17.7)
MCH RBC QN AUTO: 30.6 PG (ref 26.5–33)
MCHC RBC AUTO-ENTMCNC: 30.5 G/DL (ref 31.5–36.5)
MCV RBC AUTO: 100 FL (ref 78–100)
PLATELET # BLD AUTO: 157 10E3/UL (ref 150–450)
POTASSIUM SERPL-SCNC: 3.6 MMOL/L (ref 3.4–5.3)
RBC # BLD AUTO: 4.09 10E6/UL (ref 4.4–5.9)
SODIUM SERPL-SCNC: 143 MMOL/L (ref 136–145)
WBC # BLD AUTO: 6.1 10E3/UL (ref 4–11)

## 2023-05-07 PROCEDURE — 250N000013 HC RX MED GY IP 250 OP 250 PS 637: Performed by: HOSPITALIST

## 2023-05-07 PROCEDURE — 999N000157 HC STATISTIC RCP TIME EA 10 MIN

## 2023-05-07 PROCEDURE — 99232 SBSQ HOSP IP/OBS MODERATE 35: CPT | Performed by: HOSPITALIST

## 2023-05-07 PROCEDURE — 250N000013 HC RX MED GY IP 250 OP 250 PS 637: Performed by: INTERNAL MEDICINE

## 2023-05-07 PROCEDURE — 82310 ASSAY OF CALCIUM: CPT | Performed by: HOSPITALIST

## 2023-05-07 PROCEDURE — 250N000012 HC RX MED GY IP 250 OP 636 PS 637: Performed by: HOSPITALIST

## 2023-05-07 PROCEDURE — 36415 COLL VENOUS BLD VENIPUNCTURE: CPT | Performed by: HOSPITALIST

## 2023-05-07 PROCEDURE — 94640 AIRWAY INHALATION TREATMENT: CPT | Mod: 76

## 2023-05-07 PROCEDURE — 94640 AIRWAY INHALATION TREATMENT: CPT

## 2023-05-07 PROCEDURE — 250N000009 HC RX 250: Performed by: INTERNAL MEDICINE

## 2023-05-07 PROCEDURE — 85027 COMPLETE CBC AUTOMATED: CPT | Performed by: HOSPITALIST

## 2023-05-07 PROCEDURE — 120N000001 HC R&B MED SURG/OB

## 2023-05-07 RX ADMIN — IPRATROPIUM BROMIDE AND ALBUTEROL 1 PUFF: 20; 100 SPRAY, METERED RESPIRATORY (INHALATION) at 09:12

## 2023-05-07 RX ADMIN — FORMOTEROL FUMARATE DIHYDRATE 20 MCG: 20 SOLUTION RESPIRATORY (INHALATION) at 19:05

## 2023-05-07 RX ADMIN — PANTOPRAZOLE SODIUM 40 MG: 40 TABLET, DELAYED RELEASE ORAL at 19:45

## 2023-05-07 RX ADMIN — PAROXETINE HYDROCHLORIDE 10 MG: 10 TABLET, FILM COATED ORAL at 09:13

## 2023-05-07 RX ADMIN — PREDNISONE 40 MG: 20 TABLET ORAL at 09:13

## 2023-05-07 RX ADMIN — IPRATROPIUM BROMIDE AND ALBUTEROL 1 PUFF: 20; 100 SPRAY, METERED RESPIRATORY (INHALATION) at 19:44

## 2023-05-07 RX ADMIN — IPRATROPIUM BROMIDE AND ALBUTEROL 1 PUFF: 20; 100 SPRAY, METERED RESPIRATORY (INHALATION) at 12:29

## 2023-05-07 RX ADMIN — FUROSEMIDE 30 MG: 20 TABLET ORAL at 09:12

## 2023-05-07 RX ADMIN — ALLOPURINOL 300 MG: 300 TABLET ORAL at 09:12

## 2023-05-07 RX ADMIN — FORMOTEROL FUMARATE DIHYDRATE 20 MCG: 20 SOLUTION RESPIRATORY (INHALATION) at 08:35

## 2023-05-07 RX ADMIN — METOPROLOL TARTRATE 12.5 MG: 25 TABLET, FILM COATED ORAL at 21:56

## 2023-05-07 RX ADMIN — PAROXETINE 40 MG: 40 TABLET, FILM COATED ORAL at 09:13

## 2023-05-07 RX ADMIN — IPRATROPIUM BROMIDE AND ALBUTEROL 1 PUFF: 20; 100 SPRAY, METERED RESPIRATORY (INHALATION) at 17:40

## 2023-05-07 RX ADMIN — SENNOSIDES AND DOCUSATE SODIUM 1 TABLET: 50; 8.6 TABLET ORAL at 09:12

## 2023-05-07 RX ADMIN — PANTOPRAZOLE SODIUM 40 MG: 40 TABLET, DELAYED RELEASE ORAL at 09:13

## 2023-05-07 RX ADMIN — ASPIRIN 81 MG: 81 TABLET, COATED ORAL at 09:13

## 2023-05-07 RX ADMIN — METOPROLOL TARTRATE 12.5 MG: 25 TABLET, FILM COATED ORAL at 09:12

## 2023-05-07 ASSESSMENT — ACTIVITIES OF DAILY LIVING (ADL)
ADLS_ACUITY_SCORE: 46
ADLS_ACUITY_SCORE: 48
ADLS_ACUITY_SCORE: 46
ADLS_ACUITY_SCORE: 48

## 2023-05-07 NOTE — PLAN OF CARE
Goal Outcome Evaluation:    5/6/23, 3 p to 11 p  5/4/23 admit, Sepsis, unspecified source  Orientation: A&O to self, place, waxes and wanes, calm and pleasant    Vitals/Tele: Mildly tachycardic, on 2 L baseline    IV Access/drains: SCOTT SL    Diet: Mod carb, Minced and moist, Mildly thick liquid, using spoon, FEEDER    Mobility: Q2 T and Repo, Lift, up in chair during meals    GI/: Chronic Cardona    Wound/Skin: Pre existing healing wound in posterior bilateral ears, sacrum coccyx    Consults: Hospitalist following    Discharge Plan: Possibly Monday back to facility      See Flow sheets for assessment

## 2023-05-07 NOTE — PROGRESS NOTES
[facility]    Medicine Progress Note - Hospitalist Service    Date of Admission:  5/4/2023    Assessment & Plan   Ron Gilmore is a 82 year old male with a history of COPD (2L), Htn, CVA, HFpEF who is admitted on 5/4/2023 with shortness of breath and weakness, found to have hypercarbia and UTI.      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 Does have some increased wheezing consistent with possible COPD exacerbation. Haven't definitively seen encephalopathy but wonder if could have some decreased respiratory drive from weakness related to UTI.   - weaned off bipap in the early morning hours of 5/5 - now ok to go to med floor  - solumedrol 40mg daily - now switching do pred po short course starting 5/7 (3 more days)  - continue scheduled and prn nebs  - wean to chronic 2L O2 as able  - monitor for signs of infection or volume overload  - resume pta lasix 5/6, PTA metop also resumed with holding parameters (5/6)  - back on chronic 2L  - from [facility] Long term nursing facility? - possibly home in 1-2 days (CT/SW consulted and appreciated)     Technically severe sepsis PM 5/5 with lactic acidosis 4.5   RRT called for this - afebrile at the time with stable VS. Not convinced that this was truly sepsis especially as urine culture has now came back with mixed patty. Could have been lactic acidosis in relation to multiple albuterol treatments. LA normalized with IVF.      Mild-moderate oral pharyngeal dysphagia  Appreciate RN and SLP efforts - patient now on correct modified diet.  - continue ST per SLP guidance     AIDA - resolving  baseline creatinine about 0.9-1, up to 1.3 on admission. May be slightly volume  deplete  Mild hypotension  - hold metoprolol and lasix for now  - IVF now stopped  - 5/5 creat 1.1  - follow bmp 5/7     Concern of UTI, mixed patty on culture   associated with indwelling chirinos cathter  - ceftriaxone stopped, received 2 doses  - monitor     Mild troponin elevation: chronically elevated over months. No chest pain or other new cardiac symptoms. TTE in April with preserved EF and no WMA.   - monitor for symptoms     Hypertension  Dyslipidemia  Previous CVA  - continue PTA ASA, PTA metop and lasix held initially, resume 5/6  - resume lipitor at discharge     Depression: paxil     DM: appears to be diet controlled. Blood sugars likely to rise with steroids.   - initiate sliding scale     Gout: allopurinol     GERD: PPI           Diet: Combination Diet Moderate Consistent Carb (60 g CHO per Meal) Diet; Minced and Moist Diet (level 5); Mildly Thick (level 2) (Spoon only)    DVT Prophylaxis: Pneumatic Compression Devices  Chirinos Catheter: PRESENT, indication: Retention  Lines: None     Cardiac Monitoring: None  Code Status: No CPR- Do NOT Intubate      Clinically Significant Risk Factors                       # DMII: A1C = 6.9 % (Ref range: <5.7 %) within past 6 months, PRESENT ON ADMISSION  # Obesity: Estimated body mass index is 31.07 kg/m  as calculated from the following:    Height as of 4/10/23: 1.829 m (6').    Weight as of this encounter: 103.9 kg (229 lb 0.9 oz)., PRESENT ON ADMISSION         Disposition Plan      Expected Discharge Date: 05/09/2023                  Kiet Marcos MD  Hospitalist Service  [facility]  Securely message with [employer] (more info)  Text page via [employer] Paging/Directory   ______________________________________________________________________    Interval History   Seen and examined. Stable breathing on baseline 2L. No new pain. No fevers.    Physical Exam   Vital Signs: Temp: 97.3  F (36.3  C) Temp src: Oral BP: (!) 140/82 Pulse: 84   Resp: 18  SpO2: 95 % O2 Device: Nasal cannula Oxygen Delivery: 2 LPM  Weight: 229 lbs .93 oz    Gen: NAD, pleasant  HEENT: EOMI, MMM  Resp: no crackles, no wheezes, no increased work of resp  CV: S1S2 heard, reg rhythm, reg rate  Abdo: soft, nontender, nondistended, bowel sounds present  Ext: calves nontender, well perfused  Neuro: aa, conversant, moving arms, baseline bedbound, CN grossly intact, no facial asymmetry      Medical Decision Making       37 MINUTES SPENT BY ME on the date of service doing chart review, history, exam, documentation & further activities per the note.      Data   Recent Labs   Lab 05/07/23  1139 05/07/23  0910 05/07/23  0854 05/05/23  0908 05/05/23  0731 05/04/23 2011   WBC  --   --  6.1  --  4.9 5.7   HGB  --   --  12.5*  --  13.1* 14.8   MCV  --   --  100  --  100 100   PLT  --   --  157  --  136* 160   INR  --   --   --   --   --  1.04   NA  --   --  143  --  140 139   POTASSIUM  --   --  3.6  --  4.2 3.8   CHLORIDE  --   --  100  --  98 94*   CO2  --   --  33*  --  27 34*   BUN  --   --  25.9*  --  18.9 19.3   CR  --   --  0.98  --  1.12 1.31*   ANIONGAP  --   --  10  --  15 11   RAJ  --   --  9.3  --  8.9 9.6   * 113* 109*   < > 133* 146*   ALBUMIN  --   --   --   --   --  3.5   PROTTOTAL  --   --   --   --   --  7.8   BILITOTAL  --   --   --   --   --  0.5   ALKPHOS  --   --   --   --   --  92   ALT  --   --   --   --   --  26   AST  --   --   --   --   --  31    < > = values in this interval not displayed.

## 2023-05-07 NOTE — PLAN OF CARE
Summary: Admitted w/ SOB & generalized weakness, found to have UTI and hypercarbia Hx of COPD, CVA, HF.  DATE & TIME: 05/06/23, 2300 - 0730   Cognitive Concerns/ Orientation : A&O x 2. Disorientated to time and situation. Calm and cooperative. Garbled speech at times. Hx of prior stroke.   BEHAVIOR & AGGRESSION TOOL COLOR: Green  ABNL VS/O2: VSS on O2 2 LPM   MOBILITY: Total care. Lift, turn and repo, hx of stroke w/ left sided weakness.   PAIN MANAGMENT: Denies  DIET: Mod carb/Minced and moist/ mildly thick liquids by spoon only. Total feed.   BOWEL/BLADDER: Cardona in place drained 250 ml urine, slightly blood tinged  ABNL LAB/BG:   DRAIN/DEVICES: PIV SL, Cardona  SKIN: Pale. Scattered bruising. Generalized edema +1-2. Wounds on buttock, mepilex in place, CDI. Chronic pressure injuries behind ears. Heels red blanchable. Elevated on pillows  TESTS/PROCEDURES: None  D/C DAY/GOALS/PLACE: 1-2 days back to LTC  OTHER IMPORTANT INFO: Contact precautions maintained. Speech following. Take pills whole in apple sauce.

## 2023-05-08 VITALS
TEMPERATURE: 97.6 F | WEIGHT: 229.06 LBS | BODY MASS INDEX: 31.07 KG/M2 | DIASTOLIC BLOOD PRESSURE: 67 MMHG | OXYGEN SATURATION: 93 % | HEART RATE: 64 BPM | SYSTOLIC BLOOD PRESSURE: 139 MMHG | RESPIRATION RATE: 18 BRPM

## 2023-05-08 LAB
GLUCOSE BLDC GLUCOMTR-MCNC: 101 MG/DL (ref 70–99)
GLUCOSE BLDC GLUCOMTR-MCNC: 110 MG/DL (ref 70–99)
GLUCOSE BLDC GLUCOMTR-MCNC: 167 MG/DL (ref 70–99)

## 2023-05-08 PROCEDURE — 99239 HOSP IP/OBS DSCHRG MGMT >30: CPT | Performed by: HOSPITALIST

## 2023-05-08 PROCEDURE — 250N000013 HC RX MED GY IP 250 OP 250 PS 637: Performed by: INTERNAL MEDICINE

## 2023-05-08 PROCEDURE — 250N000013 HC RX MED GY IP 250 OP 250 PS 637: Performed by: HOSPITALIST

## 2023-05-08 PROCEDURE — 250N000012 HC RX MED GY IP 250 OP 636 PS 637: Performed by: HOSPITALIST

## 2023-05-08 RX ORDER — PREDNISONE 20 MG/1
40 TABLET ORAL DAILY
Qty: 2 TABLET | Refills: 0 | Status: SHIPPED | OUTPATIENT
Start: 2023-05-09 | End: 2023-05-10

## 2023-05-08 RX ADMIN — PANTOPRAZOLE SODIUM 40 MG: 40 TABLET, DELAYED RELEASE ORAL at 08:45

## 2023-05-08 RX ADMIN — ALLOPURINOL 300 MG: 300 TABLET ORAL at 08:45

## 2023-05-08 RX ADMIN — ASPIRIN 81 MG: 81 TABLET, COATED ORAL at 08:45

## 2023-05-08 RX ADMIN — PREDNISONE 40 MG: 20 TABLET ORAL at 08:45

## 2023-05-08 RX ADMIN — IPRATROPIUM BROMIDE AND ALBUTEROL 1 PUFF: 20; 100 SPRAY, METERED RESPIRATORY (INHALATION) at 12:56

## 2023-05-08 RX ADMIN — PAROXETINE 40 MG: 40 TABLET, FILM COATED ORAL at 08:45

## 2023-05-08 RX ADMIN — SENNOSIDES AND DOCUSATE SODIUM 1 TABLET: 50; 8.6 TABLET ORAL at 08:45

## 2023-05-08 RX ADMIN — METOPROLOL TARTRATE 12.5 MG: 25 TABLET, FILM COATED ORAL at 08:45

## 2023-05-08 RX ADMIN — PAROXETINE HYDROCHLORIDE 10 MG: 10 TABLET, FILM COATED ORAL at 08:45

## 2023-05-08 RX ADMIN — FUROSEMIDE 30 MG: 20 TABLET ORAL at 08:45

## 2023-05-08 ASSESSMENT — ACTIVITIES OF DAILY LIVING (ADL)
DOING_ERRANDS_INDEPENDENTLY_DIFFICULTY: YES
DRESSING/BATHING_DIFFICULTY: YES
WEAR_GLASSES_OR_BLIND: NO
DIFFICULTY_EATING/SWALLOWING: YES
ADLS_ACUITY_SCORE: 51
ADLS_ACUITY_SCORE: 51
EQUIPMENT_CURRENTLY_USED_AT_HOME: WHEELCHAIR, POWER
DRESSING/BATHING: BATHING DIFFICULTY, DEPENDENT;DRESSING DIFFICULTY, DEPENDENT
ADLS_ACUITY_SCORE: 51
FALL_HISTORY_WITHIN_LAST_SIX_MONTHS: NO
TOILETING_ASSISTANCE: TOILETING DIFFICULTY, DEPENDENT
ADLS_ACUITY_SCORE: 58
ADLS_ACUITY_SCORE: 51
WALKING_OR_CLIMBING_STAIRS: TRANSFERRING DIFFICULTY, DEPENDENT;STAIR CLIMBING DIFFICULTY, DEPENDENT;AMBULATION DIFFICULTY, DEPENDENT
EATING/SWALLOWING: SWALLOWING LIQUIDS;SWALLOWING SOLID FOOD
WALKING_OR_CLIMBING_STAIRS_DIFFICULTY: YES
CHANGE_IN_FUNCTIONAL_STATUS_SINCE_ONSET_OF_CURRENT_ILLNESS/INJURY: NO
ADLS_ACUITY_SCORE: 54
TOILETING_ISSUES: YES
CONCENTRATING,_REMEMBERING_OR_MAKING_DECISIONS_DIFFICULTY: NO
ADLS_ACUITY_SCORE: 51
ADLS_ACUITY_SCORE: 51

## 2023-05-08 NOTE — PLAN OF CARE
Goal Outcome Evaluation:    Summary: Admitted w/ SOB & generalized weakness, found to have UTI and hypercarbia Hx of COPD, CVA, HF.  DATE & TIME: 5/8/23, 3160-0647  Cognitive Concerns/ Orientation : Alert and oriented x 3. Disoriented to time. Calm and cooperative. Garbled speech at times. Hx of prior stroke.   BEHAVIOR & AGGRESSION TOOL COLOR: Green  ABNL VS/O2: VSS on 2 LPM NC (baseline)  MOBILITY: Total care. Lift, turned and repositioned. Left side weakness, hx of stroke  PAIN MANAGMENT: Denies  DIET: Mod carb/Minced and moist/ mildly thick liquids by spoon only. Total feed. Meds whole with applesauce  BOWEL/BLADDER: Chirinos, no BM this shift, scheduled senna given  ABNL LAB/BG: , 167  DRAIN/DEVICES: PIV SL removed for discharge, chronic chirinos   SKIN: Pale. Scattered bruising. Generalized edema +1. Wounds on buttock, mepilex in place, CDI. Chronic pressure injuries behind ears. Heels red blanchable. Elevated on pillows  TESTS/PROCEDURES: None  D/C DAY/GOALS/PLACE: Discharge to [facility] with [facility]N today by stretcher transportation 2-3pm  OTHER IMPORTANT INFO: Contact precautions maintained. Speech following.

## 2023-05-08 NOTE — PLAN OF CARE
Goal Outcome Evaluation:        5-7-2023 7p-11p  Cognitive Concerns/ Orientation : Alert and oriented x 4. Calm and cooperative. Garbled speech at times. Hx of prior stroke.   BEHAVIOR & AGGRESSION TOOL COLOR: Green  ABNL VS/O2: VSS on 2 LPM NC (chronic)  MOBILITY: Total care. Lift, turn and reposition every 2 hours, hx of stroke w/ left sided weakness.   PAIN MANAGMENT: Denies  DIET: Mod carb/Minced and moist/ mildly thick liquids by spoon only. Total feed. Meds whole with applesauce  BOWEL/BLADDER: Chirinos, no BM this evening  ABNL LAB/BG:   DRAIN/DEVICES: Peripheral IV SL right arm, chronic chirinos   SKIN: Pale. Scattered bruising. Generalized edema +1 to +2. Wounds on buttock, mepilex in place, CDI. Chronic pressure injuries behind ears. Heels red blanchable. Elevated on pillows  TESTS/PROCEDURES: None  D/C DAY/GOALS/PLACE: 1-2 days back to LTC  OTHER IMPORTANT INFO: Contact precautions maintained. Speech following.

## 2023-05-08 NOTE — PLAN OF CARE
Summary: Admitted w/ SOB & generalized weakness, found to have UTI and hypercarbia Hx of COPD, CVA, HF.  DATE & TIME: 5/7/23, 2300 - 0730  Cognitive Concerns/ Orientation : Alert and oriented x 3. Disoriented to time. Calm and cooperative. Garbled speech at times. Hx of prior stroke.   BEHAVIOR & AGGRESSION TOOL COLOR: Green  ABNL VS/O2: VSS on 2 LPM NC (chronic)  MOBILITY: Total care. Lift, turned and repositioned  PAIN MANAGMENT: Denies  DIET: Mod carb/Minced and moist/ mildly thick liquids by spoon only. Total feed. Meds whole with applesauce  BOWEL/BLADDER: Chirinos, no BM this evening  ABNL LAB/BG:   DRAIN/DEVICES: PIV SL, chronic chirinos   SKIN: Pale. Scattered bruising. Generalized edema +1. Wounds on buttock, mepilex in place, CDI. Chronic pressure injuries behind ears. Heels red blanchable. Elevated on pillows  TESTS/PROCEDURES: None  D/C DAY/GOALS/PLACE: Discharge back to LTC, possibly 5/9/23 per MD notes  OTHER IMPORTANT INFO: Contact precautions maintained. Speech following.

## 2023-05-08 NOTE — DISCHARGE SUMMARY
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      Follow-ups Needed After Discharge   Follow-up Appointments     Follow-up and recommended labs and tests       PCP for hospitalization follow up             Unresulted Labs Ordered in the Past 30 Days of this Admission     Date and Time Order Name Status Description    5/4/2023  8:00 PM Blood Culture Peripheral Blood Preliminary     5/4/2023  8:00 PM Blood Culture Line, venous Preliminary       These results will be followed up by IM/PCP    Discharge Disposition   Discharged to prior living arrangement - assisted living [facility] [place], [place]  Condition at discharge: Stable      Hospital Course   Ron Gilmore is a 82 year old male with a history of COPD (2L), Htn, CVA, HFpEF who is admitted on 5/4/2023 with shortness of breath and weakness, found to have hypercarbia and UTI.      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 Does have some increased wheezing consistent with possible COPD exacerbation. Haven't definitively seen encephalopathy but wonder if could have some decreased respiratory drive from weakness related to UTI.   - weaned off bipap in the early morning hours of 5/5 - now ok to go to [facility] floor  - solumedrol  40mg daily - now switching do pred po short course starting 5/7 (3 more days)  - continue scheduled and prn nebs  - wean to chronic 2L O2 as able  - monitor for signs of infection or volume overload  - resume pta lasix 5/6, PTA metop also resumed with holding parameters (5/6)  - back on chronic 2L  - one more day of prednisone on 5/9 to complete 5 day course  - PCP for hospitalization follow up  - resume PTA HHC RN     Technically severe sepsis PM 5/5 with lactic acidosis 4.5   RRT called for this - afebrile at the time with stable VS. Not convinced that this was truly sepsis especially as urine culture has now came back with mixed patty. Could have been lactic acidosis in relation to multiple albuterol treatments. LA normalized with IVF.      Mild-moderate oral pharyngeal dysphagia  Appreciate RN and SLP efforts - patient now on correct modified diet.  - continue ST per SLP guidance     AIDA - resolving  baseline creatinine about 0.9-1, up to 1.3 on admission. May be slightly volume deplete  Mild hypotension  - hold metoprolol and lasix for now  - IVF now stopped  - 5/5 creat 1.1  - creat 0.98 at discharge     Concern of UTI, mixed patty on culture   associated with indwelling chirinos cathter  - ceftriaxone stopped, received 2 doses  - monitor     Mild troponin elevation: chronically elevated over months. No chest pain or other new cardiac symptoms. TTE in April with preserved EF and no WMA.   - monitor for symptoms     Hypertension  Dyslipidemia  Previous CVA  - continue PTA ASA, PTA metop and lasix held initially, resume 5/6  - resume lipitor at discharge     Depression: paxil continued     DM: appears to be diet controlled. Blood sugars likely to rise with steroids.   - initiate sliding scale  - resume PTA regimen at discharge     Gout: allopurinol continued     GERD: PPI continued    Consultations This Hospital Stay   SPEECH LANGUAGE PATH ADULT IP CONSULT  CARE MANAGEMENT / SOCIAL WORK IP CONSULT    Code Status    Prior    Time Spent on this Encounter   I, Kiet Marcos MD, personally saw the patient today and spent greater than 30 minutes discharging this patient.       Kiet Marcos MD  [place] [address] [facility]  [address] [place] [address]  Phone: [phone]  ______________________________________________________________________    Physical Exam   Vital Signs: Temp: 97.6  F (36.4  C) Temp src: Oral BP: 139/67 Pulse: 64   Resp: 18 SpO2: 93 % O2 Device: Nasal cannula Oxygen Delivery: 2 LPM  Weight: 229 lbs .93 oz    Gen: NAD, pleasant  HEENT: EOMI, MMM  Resp: no focal crackles, no wheezes, no increased work of resp  CV: S1S2 heard, reg rhythm, reg rate  Abdo: soft, nontender, nondistended, bowel sounds present  Ext: calves nontender, well perfused  Neuro: aa, conversing, bedbound at baseline, CN grossly intact, no facial asymmetry         Primary Care Physician   Kalen Metcalf    Discharge Orders      Medication Therapy Management Referral      Reason for your hospital stay    Shortness of breath and weakness     Follow-up and recommended labs and tests     PCP for hospitalization follow up     Activity    Your activity upon discharge: activity as tolerated     Resume Home Care Services    Resume PTA C RN services     Diet    Follow this diet upon discharge: Orders Placed This Encounter      Combination Diet Moderate Consistent Carb (60 g CHO per Meal) Diet; Minced and Moist Diet (level 5); Mildly Thick (level 2) (Spoon only)       Significant Results and Procedures   Most Recent 3 CBC's:Recent Labs   Lab Test 05/07/23  0854 05/05/23  0731 05/04/23 2011   WBC 6.1 4.9 5.7   HGB 12.5* 13.1* 14.8    100 100    136* 160     Most Recent 3 BMP's:Recent Labs   Lab Test 05/08/23  1301 05/08/23  0959 05/08/23  0144 05/07/23  0910 05/07/23  0854 05/05/23  0908 05/05/23  0731 05/04/23 2011   NA  --   --   --   --  143  --  140 139   POTASSIUM  --   --   --   --  3.6   --  4.2 3.8   CHLORIDE  --   --   --   --  100  --  98 94*   CO2  --   --   --   --  33*  --  27 34*   BUN  --   --   --   --  25.9*  --  18.9 19.3   CR  --   --   --   --  0.98  --  1.12 1.31*   ANIONGAP  --   --   --   --  10  --  15 11   RAJ  --   --   --   --  9.3  --  8.9 9.6   * 101* 110*   < > 109*   < > 133* 146*    < > = values in this interval not displayed.   ,   Results for orders placed or performed during the hospital encounter of 05/04/23   XR Chest Port 1 View    Narrative    EXAM: XR CHEST PORT 1 VIEW  LOCATION: [facility]  DATE/TIME: 5/4/2023 9:11 PM CDT    INDICATION: SOB  COMPARISON: 04/10/2023      Impression    IMPRESSION: Negative chest.   CT Chest Pulmonary Embolism w Contrast    Narrative    EXAM: CT CHEST PULMONARY EMBOLISM W CONTRAST  LOCATION: [facility]  DATE/TIME: 5/4/2023 10:09 PM CDT    INDICATION: Shortness of breath, tachycardia hypoxia  COMPARISON: CXR 05/04/2023 CTA 04/03/2023  TECHNIQUE: CT chest pulmonary angiogram during arterial phase injection of IV contrast. Multiplanar reformats and MIP reconstructions were performed. Dose reduction techniques were used.   CONTRAST: 79 mL Isovue 370    FINDINGS:  ANGIOGRAM CHEST: Pulmonary arteries are normal caliber and negative for pulmonary emboli. Arch bovine configuration. Atherosclerosis. Thoracic aorta is negative for dissection. No CT evidence of right heart strain.    LUNGS AND PLEURA: Hypoaeration. Moderate centrilobular emphysema. Dependent atelectasis. Eventration hemidiaphragms. No pleural collections.    MEDIASTINUM/AXILLAE: Prominent mediastinal and hilar nodes likely reactive. Heart size normal.    CORONARY ARTERY CALCIFICATION: Mild.    UPPER ABDOMEN: Cholecystectomy.    MUSCULOSKELETAL: Degenerative changes. [place] screws right humeral neck.      Impression    IMPRESSION:  1.  No acute cardiovascular abnormality.       Discharge Medications   Discharge Medication  List as of 5/8/2023  1:53 PM      START taking these medications    Details   predniSONE (DELTASONE) 20 MG tablet Take 2 tablets (40 mg) by mouth daily for 1 day On Tuesday 5/9, Disp-2 tablet, R-0, E-Prescribe         CONTINUE these medications which have NOT CHANGED    Details   acetaminophen (TYLENOL) 325 MG tablet Take 650 mg by mouth every 4 hours as needed for mild pain as needed for pain/fever Max dose 3000mg/24hr, Historical      allopurinol (ZYLOPRIM) 300 MG tablet Take 300 mg by mouth every morning, Historical      aspirin (ASA) 81 MG EC tablet Take 1 tablet (81 mg) by mouth daily, Transitional      atorvastatin (LIPITOR) 10 MG tablet Take 10 mg by mouth every morning, Historical      cyanocobalamin (VITAMIN B-12) 500 MCG tablet Take 500 mcg by mouth every morning, Historical      Emollient (AMLACTIN ULTRA EX) Apply topically daily as needed TO FEET, Historical      formoterol (PERFOROMIST) 20 MCG/2ML neb solution Take 2 mLs (20 mcg) by nebulization every 12 hours for 60 days, Disp-120 mL, R-1, E-PrescribeFuture refills by PCP Dr. Kalen Metcalf with phone number [phone].      furosemide (LASIX) 20 MG tablet Take 1.5 tablets (30 mg) by mouth daily, Disp-45 tablet, R-0, E-PrescribeFuture refills by PCP Dr. Kalen Metcalf with phone number [phone].      !! ipratropium - albuterol 0.5 mg/2.5 mg/3 mL (DUONEB) 0.5-2.5 (3) MG/3ML neb solution Take 1 vial by nebulization daily as needed for shortness of breath, wheezing or cough, Historical      !! ipratropium - albuterol 0.5 mg/2.5 mg/3 mL (DUONEB) 0.5-2.5 (3) MG/3ML neb solution Take 1 vial by nebulization 3 times daily 0900, 1400, 2100, Historical      ipratropium-albuterol (COMBIVENT RESPIMAT)  MCG/ACT inhaler Inhale 1 puff into the lungs 4 times daily 0900, 1200 ,1600 ,2000, Historical      loperamide (IMODIUM) 2 MG capsule Take 2 mg by mouth every 6 hours as needed for diarrhea, Historical      methenamine hippurate (HIPREX) 1 g tablet Take  1 g by mouth 2 times daily, Historical      metoprolol tartrate (LOPRESSOR) 25 MG tablet Take 0.5 tablets (12.5 mg) by mouth 2 times daily, No Print Out      pantoprazole (PROTONIX) 40 MG EC tablet Take 40 mg by mouth 2 times daily, Historical      !! PARoxetine (PAXIL) 10 MG tablet Take 10 mg by mouth every morning Take with 40mg tablet to equal 50mg total, Historical      !! PARoxetine (PAXIL) 40 MG tablet Take 40 mg by mouth every morning Take with 10mg tablet to equal 50mg total, Historical      polyethylene glycol (MIRALAX) 17 GM/Dose powder Take 17 g by mouth daily, Disp-510 g, OTC      polyvinyl alcohol (LIQUIFILM TEARS) 1.4 % ophthalmic solution Instill 2 drops into affected eyes twice daily as needed, Historical      !! senna-docusate (SENOKOT-S/PERICOLACE) 8.6-50 MG tablet Take 1 tablet by mouth daily, Historical      !! senna-docusate (SENOKOT-S/PERICOLACE) 8.6-50 MG tablet Take 1 tablet by mouth daily as needed for constipation, Historical      simethicone (MYLICON) 125 MG chewable tablet Take 125 mg by mouth 3 times daily as needed for intestinal gas, Historical      !! Skin Protectants, Misc. (LANTISEPTIC SKIN PROTECTANT EX) Externally apply topically 2 times daily as needed Apply a thin film to vincent area/buttocks, Historical      !! Skin Protectants, Misc. (LANTISEPTIC SKIN PROTECTANT EX) Externally apply topically 2 times daily Apply a thin film to vincent area/buttocks, Historical       !! - Potential duplicate medications found. Please discuss with provider.        Allergies   No Known Allergies

## 2023-05-08 NOTE — CONSULTS
Care Management Initial Consult    General Information  Assessment completed with: Care Team Member, Shireen BRYSON RN       Primary Care Provider verified and updated as needed: Yes   Readmission within the last 30 days:        Reason for Consult: discharge planning  Advance Care Planning:            Communication Assessment  Patient's communication style: spoken language (English or Bilingual)             Cognitive  Cognitive/Neuro/Behavioral: .WDL except  Level of Consciousness: alert  Arousal Level: opens eyes spontaneously  Orientation: disoriented to, time  Mood/Behavior: calm, cooperative  Best Language: 1 - Mild to moderate  Speech: garbled, slow, spontaneous    Living Environment:   People in home: facility resident     Current living Arrangements: assisted living  Name of Facility: [facility]   Able to return to prior arrangements: yes       Family/Social Support:  Care provided by:    Provides care for:    Marital Status:              Description of Support System: Supportive, Involved         Current Resources:   Patient receiving home care services: Yes  Skilled Home Care Services: Skilled Nursing  Community Resources:    Equipment currently used at home:    Supplies currently used at home:      Employment/Financial:  Employment Status: retired        Financial Concerns:             Does the patient's insurance plan have a 3 day qualifying hospital stay waiver?  No    Lifestyle & Psychosocial Needs:  Social Determinants of Health     Tobacco Use: Medium Risk (9/26/2022)    Patient History      Smoking Tobacco Use: Former      Smokeless Tobacco Use: Never      Passive Exposure: Not on file   Alcohol Use: Not on file   Financial Resource Strain: Not on file   Food Insecurity: Not on file   Transportation Needs: Not on file   Physical Activity: Not on file   Stress: Not on file   Social Connections: Not on file   Intimate Partner Violence: Not on file   Depression: Not on file    Housing Stability: Not on file       Functional Status:  Prior to admission patient needed assistance:              Mental Health Status:          Chemical Dependency Status:                Values/Beliefs:  Spiritual, Cultural Beliefs, Confucianism Practices, Values that affect care:                 Additional Information:  Note that patient lives at [facility].     CC called to Shireen Nurse of building  [phone]  Pt lives in their extended care [facility].  Receives total cares, has aleks lift.  Gets medication management, meals, laundry, showers. Pt on chronic 2L o2.  Pt is on dysphagia/mechanical soft, nectar thick liquids.  Open to [facility] for RN (candis)  Pt can return at discharge.     Fill new meds and send with  Fax orders to [phone]    MD updated and will write discharge orders.     CC called [facility] [phone].  They confirm open to [facility] RN.  They do not offer SLP.   MD updated and will only resume Home RN.  Information added to AVS, will fax orders upon return.     [facility]  (due to weakness, o2, confusion) booked with [facility] for [phone].  PCS faxed and on chart.     Wife Yuli updated of discharge and okay with that plan to return to [facility].     CC updated Shireen or return time and [facility] resumption.  Will fax upon return.   [facility] to make packet    Bedside RN updated via M87.     CC will follow for orders and fax.     Care Management Discharge Note    Discharge Date: 05/08/2023       Discharge Disposition: Home Care, Assisted Living    Discharge Services:      Discharge DME:      Discharge Transportation: health plan transportation    Private pay costs discussed: Not applicable    Does the patient's insurance plan have a 3 day qualifying hospital stay waiver?  No    PAS Confirmation Code:    Patient/family educated on Medicare website which has current facility and service quality ratings:      Education Provided on the Discharge Plan:    Persons Notified of  Discharge Plans: Bedside RN/assisted/MD/wife  Patient/Family in Agreement with the Plan: yes    Handoff Referral Completed: No    Additional Information:  Orders received for discharge back to [facility]    Orders faxed via Epic to [phone]  Orders faxed via Home care tab to [facility] for resumption of Home RN.  No SLP needed.     Nurse to review AVS and have Pt ready for stretcher at above times.         Flora Maurice, RN                Flora Maurice, RN

## 2023-05-08 NOTE — PROGRESS NOTES
Notified provider about indwelling chirinos catheter discussed removal or continued need.    Did provider choose to remove indwelling chirinos catheter? No    Provider's chirinos indication for keeping indwelling chirinos catheter: Other - chronic indwelling chirinos catheter with plan to discharge with catheter in place.    Is there an order for indwelling chirinos catheter? Yes    *If there is a plan to keep chirinos catheter in place at discharge daily notification with provider is not necessary, but please add a notation in the treatment team sticky note that the patient will be discharging with the catheter.

## 2023-05-09 NOTE — PLAN OF CARE
Speech Language Therapy Discharge Summary    Reason for therapy discharge:    Discharged to home with home therapy.    Progress towards therapy goal(s). See goals on Care Plan in [facility] electronic health record for goal details.  Goals not met.  Barriers to achieving goals:   discharge from facility.    Therapy recommendation(s):    Continued therapy is recommended.  Rationale/Recommendations:  Continue ST needs for dysphagia management.  clinical swallow evaluation completed. Recommend continue previous feeding plan: minced/moist with mildly thick liquids via spoon. 1-1 assist due to difficulty self-feeding and concern for not following liquid by spoon, limit distractions, slow rate.

## 2023-05-10 LAB
BACTERIA BLD CULT: NO GROWTH
BACTERIA BLD CULT: NO GROWTH

## 2023-05-14 ENCOUNTER — HOSPITAL ENCOUNTER (EMERGENCY)
Facility: CLINIC | Age: 83
Discharge: HOME | End: 2023-05-14
Attending: EMERGENCY MEDICINE | Admitting: EMERGENCY MEDICINE
Payer: MEDICARE

## 2023-05-14 VITALS
HEART RATE: 87 BPM | SYSTOLIC BLOOD PRESSURE: 106 MMHG | RESPIRATION RATE: 18 BRPM | DIASTOLIC BLOOD PRESSURE: 74 MMHG | OXYGEN SATURATION: 99 % | TEMPERATURE: 98.9 F

## 2023-05-14 DIAGNOSIS — T83.9XXA COMPLICATION OF FOLEY CATHETER, INITIAL ENCOUNTER (H): ICD-10-CM

## 2023-05-14 LAB
ALBUMIN SERPL BCG-MCNC: 3.1 G/DL (ref 3.5–5.2)
ALBUMIN UR-MCNC: NEGATIVE MG/DL
ALP SERPL-CCNC: 75 U/L (ref 40–129)
ALT SERPL W P-5'-P-CCNC: 41 U/L (ref 10–50)
ANION GAP SERPL CALCULATED.3IONS-SCNC: 8 MMOL/L (ref 7–15)
APPEARANCE UR: ABNORMAL
AST SERPL W P-5'-P-CCNC: 17 U/L (ref 10–50)
BASOPHILS # BLD AUTO: 0 10E3/UL (ref 0–0.2)
BASOPHILS NFR BLD AUTO: 0 %
BILIRUB SERPL-MCNC: 0.7 MG/DL
BILIRUB UR QL STRIP: NEGATIVE
BUN SERPL-MCNC: 23.5 MG/DL (ref 8–23)
CALCIUM SERPL-MCNC: 8.9 MG/DL (ref 8.8–10.2)
CHLORIDE SERPL-SCNC: 93 MMOL/L (ref 98–107)
COLOR UR AUTO: ABNORMAL
CREAT SERPL-MCNC: 0.95 MG/DL (ref 0.67–1.17)
DEPRECATED HCO3 PLAS-SCNC: 37 MMOL/L (ref 22–29)
EOSINOPHIL # BLD AUTO: 0.1 10E3/UL (ref 0–0.7)
EOSINOPHIL NFR BLD AUTO: 1 %
ERYTHROCYTE [DISTWIDTH] IN BLOOD BY AUTOMATED COUNT: 15.4 % (ref 10–15)
GFR SERPL CREATININE-BSD FRML MDRD: 81 ML/MIN/1.73M2
GLUCOSE SERPL-MCNC: 148 MG/DL (ref 70–99)
GLUCOSE UR STRIP-MCNC: NEGATIVE MG/DL
HCT VFR BLD AUTO: 47.5 % (ref 40–53)
HGB BLD-MCNC: 14.8 G/DL (ref 13.3–17.7)
HGB UR QL STRIP: ABNORMAL
HOLD SPECIMEN: NORMAL
IMM GRANULOCYTES # BLD: 0.2 10E3/UL
IMM GRANULOCYTES NFR BLD: 2 %
KETONES UR STRIP-MCNC: NEGATIVE MG/DL
LEUKOCYTE ESTERASE UR QL STRIP: ABNORMAL
LYMPHOCYTES # BLD AUTO: 1.2 10E3/UL (ref 0.8–5.3)
LYMPHOCYTES NFR BLD AUTO: 11 %
MCH RBC QN AUTO: 30.8 PG (ref 26.5–33)
MCHC RBC AUTO-ENTMCNC: 31.2 G/DL (ref 31.5–36.5)
MCV RBC AUTO: 99 FL (ref 78–100)
MONOCYTES # BLD AUTO: 1 10E3/UL (ref 0–1.3)
MONOCYTES NFR BLD AUTO: 9 %
MUCOUS THREADS #/AREA URNS LPF: PRESENT /LPF
NEUTROPHILS # BLD AUTO: 8.2 10E3/UL (ref 1.6–8.3)
NEUTROPHILS NFR BLD AUTO: 77 %
NITRATE UR QL: NEGATIVE
NRBC # BLD AUTO: 0 10E3/UL
NRBC BLD AUTO-RTO: 0 /100
PH UR STRIP: 7.5 [PH] (ref 5–7)
PLATELET # BLD AUTO: 211 10E3/UL (ref 150–450)
POTASSIUM SERPL-SCNC: 3.4 MMOL/L (ref 3.4–5.3)
PROT SERPL-MCNC: 6.6 G/DL (ref 6.4–8.3)
RBC # BLD AUTO: 4.81 10E6/UL (ref 4.4–5.9)
RBC URINE: 50 /HPF
SODIUM SERPL-SCNC: 138 MMOL/L (ref 136–145)
SP GR UR STRIP: 1.01 (ref 1–1.03)
SQUAMOUS EPITHELIAL: <1 /HPF
UROBILINOGEN UR STRIP-MCNC: NORMAL MG/DL
WBC # BLD AUTO: 10.7 10E3/UL (ref 4–11)
WBC CLUMPS #/AREA URNS HPF: PRESENT /HPF
WBC URINE: >182 /HPF
YEAST #/AREA URNS HPF: ABNORMAL /HPF

## 2023-05-14 PROCEDURE — 85025 COMPLETE CBC W/AUTO DIFF WBC: CPT | Performed by: EMERGENCY MEDICINE

## 2023-05-14 PROCEDURE — 51702 INSERT TEMP BLADDER CATH: CPT

## 2023-05-14 PROCEDURE — 87106 FUNGI IDENTIFICATION YEAST: CPT | Performed by: EMERGENCY MEDICINE

## 2023-05-14 PROCEDURE — 81001 URINALYSIS AUTO W/SCOPE: CPT | Performed by: EMERGENCY MEDICINE

## 2023-05-14 PROCEDURE — 99283 EMERGENCY DEPT VISIT LOW MDM: CPT

## 2023-05-14 PROCEDURE — 36415 COLL VENOUS BLD VENIPUNCTURE: CPT | Performed by: EMERGENCY MEDICINE

## 2023-05-14 PROCEDURE — 80053 COMPREHEN METABOLIC PANEL: CPT | Performed by: EMERGENCY MEDICINE

## 2023-05-14 ASSESSMENT — ENCOUNTER SYMPTOMS
DIARRHEA: 0
COUGH: 0
FEVER: 0
VOMITING: 0
ABDOMINAL PAIN: 1

## 2023-05-14 ASSESSMENT — ACTIVITIES OF DAILY LIVING (ADL)
ADLS_ACUITY_SCORE: 35
ADLS_ACUITY_SCORE: 35

## 2023-05-14 NOTE — ED PROVIDER NOTES
History     Chief Complaint:  Abdominal Pain       The history is provided by the patient.      Ron Gilmore is a 82 year old male with a history of COPD, diabetes, and stroke who presents with pain at the catheter site and sensation of a clogged chirinos catheter. Patient reports he noticed his catheter became clogged at 1200 this afternoon. He denies any fevers, vomiting, diarrhea, or cough. He suspects the last time he had his chirinos exchanged was 2 weeks ago during his hospital visit.     Review of External Notes: I personally reviewed the discharge summary from 5/8.  The patient was admitted for COPD exacerbation.  At that time it was noted that he has a chronic indwelling Chirinos catheter for BPH.    ROS:  Review of Systems   Constitutional: Negative for fever.   Respiratory: Negative for cough.    Gastrointestinal: Positive for abdominal pain. Negative for diarrhea and vomiting.   All other systems reviewed and are negative.    Allergies:  No known drug allergies      Medications:    Zyloprim  Aspirin 81 mg  Lipitor  Vitamin B-12  Emollient  Formoterol  Lasix  Albuterol  Imodium  Hiprex  Lopressor  Protonix  Paxil  Mylicon  Flomax  Lopressor  Norco     Past Medical History:    Major depressive disorder  BPH  Cataract  Cholelithiasis  COPD  Depression  Diabetes mellitus  Dyshidrotic foot dermatitis  Edema  Gout  Hyperlipidemia  Hypertension  Kidney stones  Lumbago  Lumbar disc displacement without myelopathy  Neuropathy  Muscle weakness  Diabetic neuropathy  Stroke  Spinal stenosis  Urinary retention with incomplete bladder emptying  UTI  Vasovagal episode  SIRS  CVA  Metabolic encephalopathy  Severe sepsis  Lactic acidosis  Febrile illness  Obstructive right sided ureteral stone resulting in hydronephrosis  Hemiparesis affecting left side  DVT of right peroneal vein  Gastrointestinal hemorrhage      Past Surgical History:    Appendectomy  Arthroscopy shoulder rotator cuff repair  Bilateral cataract  surgery  Cholecystectomy   Colonoscopy x2  Cystoscopy  Cystoscopy, transurethral resection  EP loop recorder implant   EGD x2  Right eyelid surgery  IVC filter placement  Nephrostomy tube placement right  Right ureteral stent placement   Right hip replacement  Bilateral knee surgery  Laminectomy lumbar one level  Laser holmium lithotripsy ureter, right stent insertion  Tonsillectomy     Family History:    Father: Prostate cancer    Social History:  Presents alone.   PCP: Kalen Metcalf     Physical Exam     Patient Vitals for the past 24 hrs:   BP Temp Temp src Pulse Resp SpO2   05/14/23 1354 122/77 98.9  F (37.2  C) Oral 83 18 99 %        Physical Exam  Constitutional:       General: He is not in acute distress.     Appearance: Normal appearance. He is not toxic-appearing.   HENT:      Head: Atraumatic.      Mouth/Throat:      Mouth: Mucous membranes are moist.      Pharynx: Oropharynx is clear.   Eyes:      General: No scleral icterus.     Conjunctiva/sclera: Conjunctivae normal.   Cardiovascular:      Rate and Rhythm: Normal rate and regular rhythm.      Heart sounds: Normal heart sounds.   Pulmonary:      Effort: Pulmonary effort is normal. No respiratory distress.      Breath sounds: Normal breath sounds.   Abdominal:      Palpations: Abdomen is soft.      Comments: Mild suprapubic tenderness   Genitourinary:     Penis: Normal.       Testes: Normal.      Comments: Cardona catheter in place with sediment present.  Musculoskeletal:         General: No deformity.      Cervical back: Neck supple.   Skin:     General: Skin is warm.      Capillary Refill: Capillary refill takes less than 2 seconds.   Neurological:      Mental Status: He is alert.      Comments: Alert and keenly interactive.  Left upper and lower extremity weakness compared to the right which is baseline.   Psychiatric:         Mood and Affect: Mood normal.         Behavior: Behavior normal.           Emergency Department Course     Laboratory:  Labs  Ordered and Resulted from Time of ED Arrival to Time of ED Departure   COMPREHENSIVE METABOLIC PANEL - Abnormal       Result Value    Sodium 138      Potassium 3.4      Chloride 93 (*)     Carbon Dioxide (CO2) 37 (*)     Anion Gap 8      Urea Nitrogen 23.5 (*)     Creatinine 0.95      Calcium 8.9      Glucose 148 (*)     Alkaline Phosphatase 75      AST 17      ALT 41      Protein Total 6.6      Albumin 3.1 (*)     Bilirubin Total 0.7      GFR Estimate 81     CBC WITH PLATELETS AND DIFFERENTIAL - Abnormal    WBC Count 10.7      RBC Count 4.81      Hemoglobin 14.8      Hematocrit 47.5      MCV 99      MCH 30.8      MCHC 31.2 (*)     RDW 15.4 (*)     Platelet Count 211      % Neutrophils 77      % Lymphocytes 11      % Monocytes 9      % Eosinophils 1      % Basophils 0      % Immature Granulocytes 2      NRBCs per 100 WBC 0      Absolute Neutrophils 8.2      Absolute Lymphocytes 1.2      Absolute Monocytes 1.0      Absolute Eosinophils 0.1      Absolute Basophils 0.0      Absolute Immature Granulocytes 0.2      Absolute NRBCs 0.0     ROUTINE UA WITH MICROSCOPIC REFLEX TO CULTURE - Abnormal    Color Urine Straw      Appearance Urine Slightly Cloudy (*)     Glucose Urine Negative      Bilirubin Urine Negative      Ketones Urine Negative      Specific Gravity Urine 1.011      Blood Urine Moderate (*)     pH Urine 7.5 (*)     Protein Albumin Urine Negative      Urobilinogen Urine Normal      Nitrite Urine Negative      Leukocyte Esterase Urine Large (*)     WBC Clumps Urine Present (*)     Budding Yeast Urine Few (*)     Mucus Urine Present (*)     RBC Urine 50 (*)     WBC Urine >182 (*)     Squamous Epithelials Urine <1     URINE CULTURE      Emergency Department Course & Assessments:    Bladder scan with less than 100 mils of urine    Disposition:  The patient was discharged to home.     Impression & Plan      Medical Decision Making:  This patient is an 82-year-old man with a chronic indwelling Cardona catheter.  He  feels that the catheter is not draining since noon today and that he has pain at the catheter insertion site.  He has had no subjective symptoms of an infection.  He is afebrile and is not tachycardic or hypotensive.  White count is normal.  Renal function looks good.  There is quite a bit of sediment in the Cardona catheter.  The Cardona was exchanged.  He has had chronic pyuria and bacteriuria with his most recent urine culture is not demonstrating any pathologic organism.  He does have consistent changes today and we will follow cultures and hold off on initiating antibiotics.  The patient feels improved and his catheter is draining well following discharge.  He had less than 100 mils of urine on his initial bladder scan.    Diagnosis:    ICD-10-CM    1. Complication of Cardona catheter, initial encounter (H)  T83.9XXA          Scribe Disclosure:  I, DOTTIE SOLIS, am serving as a scribe at 2:09 PM on 5/14/2023 to document services personally performed by Carlos Remy MD based on my observations and the provider's statements to me.   5/14/2023   Carlos Remy MD McRoberts, Sean Edward, MD  05/14/23 1600

## 2023-05-14 NOTE — ED NOTES
Bed: ED14  Expected date:   Expected time:   Means of arrival:   Comments:  Saira - 82 M abd pain eta 1350

## 2023-05-14 NOTE — ED TRIAGE NOTES
Lower abdominal pain and bowel pain today.  Hx of catheter, in the hospitals last week for UTI and pneumonia.  Patient is at his mental baseline.

## 2023-05-16 LAB — BACTERIA UR CULT: ABNORMAL

## 2023-07-17 ENCOUNTER — HOSPITAL ENCOUNTER (EMERGENCY)
Facility: CLINIC | Age: 83
Discharge: NURSING, REHAB OR LONG-TERM CARE | End: 2023-07-17
Attending: EMERGENCY MEDICINE | Admitting: EMERGENCY MEDICINE
Payer: MEDICARE

## 2023-07-17 VITALS
SYSTOLIC BLOOD PRESSURE: 127 MMHG | DIASTOLIC BLOOD PRESSURE: 80 MMHG | OXYGEN SATURATION: 93 % | HEART RATE: 84 BPM | TEMPERATURE: 98.2 F | RESPIRATION RATE: 22 BRPM

## 2023-07-17 DIAGNOSIS — T83.9XXA FOLEY CATHETER PROBLEM, INITIAL ENCOUNTER (H): ICD-10-CM

## 2023-07-17 PROCEDURE — 99283 EMERGENCY DEPT VISIT LOW MDM: CPT | Mod: 25

## 2023-07-17 PROCEDURE — 51702 INSERT TEMP BLADDER CATH: CPT

## 2023-07-17 ASSESSMENT — ACTIVITIES OF DAILY LIVING (ADL)
ADLS_ACUITY_SCORE: 35
ADLS_ACUITY_SCORE: 35

## 2023-07-18 NOTE — ED NOTES
New 16 F catheter placed with no issues. Will call for ride back to [facility].

## 2023-07-18 NOTE — ED NOTES
Bed: ED19  Expected date: 7/17/23  Expected time: 9:00 PM  Means of arrival: Ambulance  Comments:  Saira 2 82M dislodged catheter

## 2023-07-18 NOTE — ED PROVIDER NOTES
History     Chief Complaint:  Catheter Problem       The history is provided by the patient.      Ron Gilmore is a 82 year old male who presents with a catheter problem. He said that his catheter fell out today. He had the catheter for the past 7 years. Denies, nausea, vomiting, fever, chills, abdominal pain, diarrhea, or bleeding at the tip of the penis. He lives at [facility].    Independent Historian:   None - Patient Only    Review of External Notes:   I reviewed the paperwork that was sent with the patient from the facility, [facility], including medications.    Medications:    Zyloprim  Aspirin 81 mg   Lipitor  Amlactin  Lasix  Duoneb  Lopressor     Past Medical History:    Benign prostatic hyperplasia   Cataract surgery   COPD  Depression   Diabetes mellitus   Hyperlipidemia   Hypertension   Obesity  Peripheral neuropathy   Spinal stenosis  Stroke  Urinary retention    Past Surgical History:    Appendectomy   Rotator cuff repair   Cataract surgery cholecystectomy   Colonoscopy   Cystoscopy  Loop recorder implant  Esophagogastroduodenoscopy   IVC filter placement  Nephrostomy tube placement right  Ureteral stent placement right  Total hip arthroplasty, right  Laminectomy  Bilateral knee surgery   Tonsillectomy      Physical Exam     Patient Vitals for the past 24 hrs:   BP Temp Temp src Pulse Resp SpO2   07/17/23 2142 -- 98.2  F (36.8  C) Tympanic -- -- --   07/17/23 2138 -- -- -- 84 -- --   07/17/23 2137 -- -- -- -- 22 93 %   07/17/23 2132 -- -- -- -- -- 96 %   07/17/23 2107 127/80 -- -- -- -- 95 %        Physical Exam  General:  Sitting on bed, comfortable appearing.   HENT:  No obvious trauma to head  Right Ear:  External ear normal.   Left Ear:  External ear normal.   Nose:  Nose normal.   Eyes:  Conjunctivae and EOM are normal.  Neck: Normal range of motion. Neck supple. No tracheal deviation present.   Pulm/Chest: No respiratory distress  M/S: Normal range of motion.    Neuro: Alert. GCS 15.  Skin: Skin is warm and dry. No rash noted. Not diaphoretic.   Psych: Normal mood and affect. Behavior is normal.   : Circumcised male. No chirinos catheter in place.  No blood at the tip of the penis or drainage or discharge    Emergency Department Course   Emergency Department Course & Assessments:  Interventions:  Medications - No data to display     Assessments:  2119 I obtained the history and examined the patient as noted above.     Independent Interpretation (X-rays, CTs, rhythm strip):  None    Consultations/Discussion of Management or Tests:  None     Social Determinants of Health affecting care:   None    Disposition:  The patient was discharged to home.     Impression & Plan    Medical Decision Making:  Ron Gilmore is a very pleasant 82 year old male who presents today for a catheter problem.  Needing a Chirinos catheter placed.  He has had a chronic indwelling Chirinos catheter for the past 7 years secondary to her prior stroke causing hemiparesis.  The patient reports it was accidentally pulled out today.  He has no pain.  No bleeding at the tip of the penis.  He has a benign abdominal exam.  No fever.  A catheter was easily replaced with clear yellow urine return.  Chirinos catheter care discussed.  Patient had no additional concerns.  No indication for urine analysis.  Patient sent back to [facility].    The treatment plan was discussed with the patient and they expressed understanding of this plan and consented to the plan.  In addition, the patient will return to the emergency department if their symptoms persist, worsen, if new symptoms arise or if there is any concern as other pathology may be present that is not evident at this time. They also understand the importance of close follow up in the clinic and if unable to do so will return to the emergency department for a reevaluation. All questions were answered.    Diagnosis:    ICD-10-CM    1. Chirinos catheter problem,  initial encounter (H)  T83.9XXA            Discharge Medications:  New Prescriptions    No medications on file     Scribe Disclosure:  I, Kylah Caro, am serving as a scribe  for Kiet Farr at 9:30 PM on 7/17/2023 to document services personally performed by Ok Jurado DO based on my observations and the provider's statements to me.    I, Kiet Farr, am serving as a scribe at 9:26 PM on 7/17/2023 to document services personally performed by Ok Jurado DO based on my observations and the provider's statements to me.     7/17/2023   Ok Jurado DO Anderson, Robert James, DO  07/17/23 2142

## 2023-07-18 NOTE — ED TRIAGE NOTES
Pt comes in via EMS from [facility] after staff found his catheter dislodged sometime around noon. Someone was supposed to come and replace it around 6 pm and never showed up. Pt has had the catheter in for 7 years.       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 : prior coronary artery disease .  prior bradycardia.  4 weeks event monitor.   he is wearing it now. \par mild ischemia in RCa. medical management.  repeat stress test to evaluate severity of ischemia.  \par  2) Leg pain: claudication. recent intervention on the left leg. Successful intervention via left brachial to the leftpopliteal artery. Failed Angioplasty of right common iliac .  Will have  intervention of the Right common iliac.  will sent for cath lab procedure booking if KAREEM and US arterial duplex very abnormal. \par  stable. symptoms. Peripheral vascular disease./    . Dual antiplatelet therapy \par 3) Atrial fibrillation: left renal hematoma and bleeding. s/p watchman.  1 year CANDIDA- no leak.   asa 81  .   4 weeks emcot monitor result to be reviewed.   if higher heart rates high then add toprol xl. \par 4) CAD: moderate  LAD diseae and severe RPKL disease. Dual antiplatelet therapy Ct statins. lipitor 20 mg daily. : stress test: mild RcA ischemia. medical management.  repeat stress test  \par 5) Hypotension: On midodrine. Orthostatic.  midodrine  15 mg Q8. Compression stiockings \par 5 ) Mild carotid atherosclerosis. '6) SAH: due to fall.

## 2023-08-16 ENCOUNTER — TRANSFERRED RECORDS (OUTPATIENT)
Dept: HEALTH INFORMATION MANAGEMENT | Facility: CLINIC | Age: 83
End: 2023-08-16

## 2023-08-17 ENCOUNTER — TELEPHONE (OUTPATIENT)
Dept: CARDIOLOGY | Facility: CLINIC | Age: 83
End: 2023-08-17
Payer: MEDICARE

## 2023-08-17 NOTE — TELEPHONE ENCOUNTER
Alert for Reveal LinQ 2 reaching RRT on 8/16/23.  Placed a call out to pt with no answer.  Left a VM to have Pt call clinic back to discuss next steps (whether he would like to keep it in or make an appt. To take it out).     Will await pt's return call.

## 2023-08-21 NOTE — TELEPHONE ENCOUNTER
Talked to López Amin about RUBEN of loop recorder.  Explained to her that Ron had the choice to leave the loop recorder in or take it out.      López stated that she would have a conversation with Ron and call us back to let us know.

## 2023-08-31 NOTE — TELEPHONE ENCOUNTER
Spoke with López.  She discussed with Ron who has decided to leave the loop in place.  They are aware to call if he ever changes his mind.  ELVER BLACK

## 2023-09-20 ENCOUNTER — HOSPITAL ENCOUNTER (INPATIENT)
Facility: CLINIC | Age: 83
LOS: 3 days | Discharge: NURSING, REHAB OR LONG-TERM CARE | DRG: 178 | End: 2023-09-24
Attending: EMERGENCY MEDICINE | Admitting: INTERNAL MEDICINE
Payer: MEDICARE

## 2023-09-20 ENCOUNTER — APPOINTMENT (OUTPATIENT)
Dept: GENERAL RADIOLOGY | Facility: CLINIC | Age: 83
DRG: 178 | End: 2023-09-20
Attending: EMERGENCY MEDICINE
Payer: MEDICARE

## 2023-09-20 ENCOUNTER — APPOINTMENT (OUTPATIENT)
Dept: CT IMAGING | Facility: CLINIC | Age: 83
DRG: 178 | End: 2023-09-20
Attending: EMERGENCY MEDICINE
Payer: MEDICARE

## 2023-09-20 DIAGNOSIS — J69.0 ASPIRATION PNEUMONIA OF RIGHT LOWER LOBE, UNSPECIFIED ASPIRATION PNEUMONIA TYPE (H): ICD-10-CM

## 2023-09-20 DIAGNOSIS — I69.354 HEMIPARESIS AFFECTING LEFT SIDE AS LATE EFFECT OF CEREBROVASCULAR ACCIDENT (CVA) (H): ICD-10-CM

## 2023-09-20 DIAGNOSIS — I50.31 ACUTE DIASTOLIC CONGESTIVE HEART FAILURE (H): ICD-10-CM

## 2023-09-20 DIAGNOSIS — Z66 DNR (DO NOT RESUSCITATE): ICD-10-CM

## 2023-09-20 DIAGNOSIS — S31.809A OPEN WOUND OF BUTTOCK, UNSPECIFIED LATERALITY, INITIAL ENCOUNTER: Primary | ICD-10-CM

## 2023-09-20 DIAGNOSIS — J18.9 PNEUMONIA DUE TO INFECTIOUS ORGANISM, UNSPECIFIED LATERALITY, UNSPECIFIED PART OF LUNG: ICD-10-CM

## 2023-09-20 DIAGNOSIS — J96.01 ACUTE RESPIRATORY FAILURE WITH HYPOXIA (H): ICD-10-CM

## 2023-09-20 DIAGNOSIS — R53.81 PHYSICAL DECONDITIONING: ICD-10-CM

## 2023-09-20 DIAGNOSIS — Z97.8 CHRONIC INDWELLING FOLEY CATHETER: ICD-10-CM

## 2023-09-20 DIAGNOSIS — J69.0 ASPIRATION PNEUMONIA OF BOTH LOWER LOBES DUE TO REGURGITATED FOOD (H): ICD-10-CM

## 2023-09-20 LAB
ALBUMIN SERPL BCG-MCNC: 3.3 G/DL (ref 3.5–5.2)
ALBUMIN UR-MCNC: NEGATIVE MG/DL
ALP SERPL-CCNC: 73 U/L (ref 40–129)
ALT SERPL W P-5'-P-CCNC: 12 U/L (ref 0–70)
AMMONIA PLAS-SCNC: 16 UMOL/L (ref 16–60)
AMORPH CRY #/AREA URNS HPF: ABNORMAL /HPF
ANION GAP SERPL CALCULATED.3IONS-SCNC: 12 MMOL/L (ref 7–15)
APPEARANCE UR: ABNORMAL
AST SERPL W P-5'-P-CCNC: 14 U/L (ref 0–45)
BACTERIA #/AREA URNS HPF: ABNORMAL /HPF
BASOPHILS # BLD AUTO: 0 10E3/UL (ref 0–0.2)
BASOPHILS NFR BLD AUTO: 0 %
BILIRUB SERPL-MCNC: 0.8 MG/DL
BILIRUB UR QL STRIP: NEGATIVE
BUN SERPL-MCNC: 16.9 MG/DL (ref 8–23)
CALCIUM SERPL-MCNC: 9.2 MG/DL (ref 8.8–10.2)
CHLORIDE SERPL-SCNC: 93 MMOL/L (ref 98–107)
COLOR UR AUTO: ABNORMAL
CREAT SERPL-MCNC: 0.87 MG/DL (ref 0.67–1.17)
DEPRECATED HCO3 PLAS-SCNC: 30 MMOL/L (ref 22–29)
EGFRCR SERPLBLD CKD-EPI 2021: 87 ML/MIN/1.73M2
EOSINOPHIL # BLD AUTO: 0.3 10E3/UL (ref 0–0.7)
EOSINOPHIL NFR BLD AUTO: 2 %
ERYTHROCYTE [DISTWIDTH] IN BLOOD BY AUTOMATED COUNT: 15.1 % (ref 10–15)
FLUAV RNA SPEC QL NAA+PROBE: NEGATIVE
FLUBV RNA RESP QL NAA+PROBE: NEGATIVE
GLUCOSE SERPL-MCNC: 167 MG/DL (ref 70–99)
GLUCOSE UR STRIP-MCNC: NEGATIVE MG/DL
HCO3 BLDV-SCNC: 35 MMOL/L (ref 21–28)
HCT VFR BLD AUTO: 39.3 % (ref 40–53)
HGB BLD-MCNC: 12.1 G/DL (ref 13.3–17.7)
HGB UR QL STRIP: NEGATIVE
HOLD SPECIMEN: NORMAL
HYALINE CASTS: 2 /LPF
IMM GRANULOCYTES # BLD: 0 10E3/UL
IMM GRANULOCYTES NFR BLD: 0 %
KETONES UR STRIP-MCNC: NEGATIVE MG/DL
LACTATE BLD-SCNC: 1.9 MMOL/L
LEUKOCYTE ESTERASE UR QL STRIP: ABNORMAL
LYMPHOCYTES # BLD AUTO: 0.9 10E3/UL (ref 0.8–5.3)
LYMPHOCYTES NFR BLD AUTO: 9 %
MAGNESIUM SERPL-MCNC: 1.9 MG/DL (ref 1.7–2.3)
MCH RBC QN AUTO: 30.7 PG (ref 26.5–33)
MCHC RBC AUTO-ENTMCNC: 30.8 G/DL (ref 31.5–36.5)
MCV RBC AUTO: 100 FL (ref 78–100)
MONOCYTES # BLD AUTO: 0.9 10E3/UL (ref 0–1.3)
MONOCYTES NFR BLD AUTO: 8 %
MUCOUS THREADS #/AREA URNS LPF: PRESENT /LPF
NEUTROPHILS # BLD AUTO: 8.3 10E3/UL (ref 1.6–8.3)
NEUTROPHILS NFR BLD AUTO: 81 %
NITRATE UR QL: NEGATIVE
NRBC # BLD AUTO: 0 10E3/UL
NRBC BLD AUTO-RTO: 0 /100
NT-PROBNP SERPL-MCNC: 215 PG/ML (ref 0–1800)
PCO2 BLDV: 61 MM HG (ref 40–50)
PH BLDV: 7.37 [PH] (ref 7.32–7.43)
PH UR STRIP: 7 [PH] (ref 5–7)
PLATELET # BLD AUTO: 198 10E3/UL (ref 150–450)
PO2 BLDV: 20 MM HG (ref 25–47)
POTASSIUM SERPL-SCNC: 3.5 MMOL/L (ref 3.4–5.3)
PROT SERPL-MCNC: 7.5 G/DL (ref 6.4–8.3)
RBC # BLD AUTO: 3.94 10E6/UL (ref 4.4–5.9)
RBC URINE: 4 /HPF
RSV RNA SPEC NAA+PROBE: NEGATIVE
SAO2 % BLDV: 28 % (ref 94–100)
SARS-COV-2 RNA RESP QL NAA+PROBE: NEGATIVE
SODIUM SERPL-SCNC: 135 MMOL/L (ref 136–145)
SP GR UR STRIP: 1.01 (ref 1–1.03)
TSH SERPL DL<=0.005 MIU/L-ACNC: 1.12 UIU/ML (ref 0.3–4.2)
UROBILINOGEN UR STRIP-MCNC: NORMAL MG/DL
WBC # BLD AUTO: 10.4 10E3/UL (ref 4–11)
WBC URINE: 25 /HPF

## 2023-09-20 PROCEDURE — 250N000011 HC RX IP 250 OP 636: Performed by: EMERGENCY MEDICINE

## 2023-09-20 PROCEDURE — 96367 TX/PROPH/DG ADDL SEQ IV INF: CPT

## 2023-09-20 PROCEDURE — 87637 SARSCOV2&INF A&B&RSV AMP PRB: CPT | Performed by: EMERGENCY MEDICINE

## 2023-09-20 PROCEDURE — G0378 HOSPITAL OBSERVATION PER HR: HCPCS

## 2023-09-20 PROCEDURE — 258N000003 HC RX IP 258 OP 636: Performed by: EMERGENCY MEDICINE

## 2023-09-20 PROCEDURE — 83880 ASSAY OF NATRIURETIC PEPTIDE: CPT | Performed by: EMERGENCY MEDICINE

## 2023-09-20 PROCEDURE — 96365 THER/PROPH/DIAG IV INF INIT: CPT

## 2023-09-20 PROCEDURE — G1010 CDSM STANSON: HCPCS

## 2023-09-20 PROCEDURE — 71046 X-RAY EXAM CHEST 2 VIEWS: CPT

## 2023-09-20 PROCEDURE — 82803 BLOOD GASES ANY COMBINATION: CPT

## 2023-09-20 PROCEDURE — 85025 COMPLETE CBC W/AUTO DIFF WBC: CPT | Performed by: EMERGENCY MEDICINE

## 2023-09-20 PROCEDURE — 99222 1ST HOSP IP/OBS MODERATE 55: CPT | Mod: AI | Performed by: INTERNAL MEDICINE

## 2023-09-20 PROCEDURE — 80053 COMPREHEN METABOLIC PANEL: CPT | Performed by: EMERGENCY MEDICINE

## 2023-09-20 PROCEDURE — 82140 ASSAY OF AMMONIA: CPT | Performed by: EMERGENCY MEDICINE

## 2023-09-20 PROCEDURE — 96376 TX/PRO/DX INJ SAME DRUG ADON: CPT

## 2023-09-20 PROCEDURE — 83605 ASSAY OF LACTIC ACID: CPT

## 2023-09-20 PROCEDURE — 36415 COLL VENOUS BLD VENIPUNCTURE: CPT | Performed by: EMERGENCY MEDICINE

## 2023-09-20 PROCEDURE — 84443 ASSAY THYROID STIM HORMONE: CPT | Performed by: EMERGENCY MEDICINE

## 2023-09-20 PROCEDURE — 83735 ASSAY OF MAGNESIUM: CPT | Performed by: EMERGENCY MEDICINE

## 2023-09-20 PROCEDURE — 250N000011 HC RX IP 250 OP 636: Performed by: INTERNAL MEDICINE

## 2023-09-20 PROCEDURE — 250N000013 HC RX MED GY IP 250 OP 250 PS 637: Performed by: INTERNAL MEDICINE

## 2023-09-20 PROCEDURE — 81001 URINALYSIS AUTO W/SCOPE: CPT | Performed by: EMERGENCY MEDICINE

## 2023-09-20 PROCEDURE — 99285 EMERGENCY DEPT VISIT HI MDM: CPT | Mod: 25

## 2023-09-20 PROCEDURE — 87086 URINE CULTURE/COLONY COUNT: CPT | Performed by: EMERGENCY MEDICINE

## 2023-09-20 RX ORDER — AMPICILLIN AND SULBACTAM 2; 1 G/1; G/1
3 INJECTION, POWDER, FOR SOLUTION INTRAMUSCULAR; INTRAVENOUS ONCE
Status: COMPLETED | OUTPATIENT
Start: 2023-09-20 | End: 2023-09-20

## 2023-09-20 RX ORDER — AMOXICILLIN 250 MG
1 CAPSULE ORAL 2 TIMES DAILY PRN
Status: DISCONTINUED | OUTPATIENT
Start: 2023-09-20 | End: 2023-09-24 | Stop reason: HOSPADM

## 2023-09-20 RX ORDER — FORMOTEROL FUMARATE DIHYDRATE 20 UG/2ML
20 SOLUTION RESPIRATORY (INHALATION) EVERY 12 HOURS
Status: DISCONTINUED | OUTPATIENT
Start: 2023-09-21 | End: 2023-09-24 | Stop reason: HOSPADM

## 2023-09-20 RX ORDER — AMOXICILLIN 250 MG
2 CAPSULE ORAL 2 TIMES DAILY PRN
Status: DISCONTINUED | OUTPATIENT
Start: 2023-09-20 | End: 2023-09-24 | Stop reason: HOSPADM

## 2023-09-20 RX ORDER — IPRATROPIUM BROMIDE AND ALBUTEROL SULFATE 2.5; .5 MG/3ML; MG/3ML
1 SOLUTION RESPIRATORY (INHALATION) 3 TIMES DAILY
Status: DISCONTINUED | OUTPATIENT
Start: 2023-09-20 | End: 2023-09-24 | Stop reason: HOSPADM

## 2023-09-20 RX ORDER — VITAMIN B COMPLEX
1 TABLET ORAL DAILY
COMMUNITY

## 2023-09-20 RX ORDER — DOXYCYCLINE 100 MG/1
100 CAPSULE ORAL EVERY 12 HOURS SCHEDULED
Status: DISCONTINUED | OUTPATIENT
Start: 2023-09-20 | End: 2023-09-24 | Stop reason: HOSPADM

## 2023-09-20 RX ORDER — AMOXICILLIN 250 MG
1 CAPSULE ORAL DAILY
Status: DISCONTINUED | OUTPATIENT
Start: 2023-09-21 | End: 2023-09-24 | Stop reason: HOSPADM

## 2023-09-20 RX ORDER — AMPICILLIN AND SULBACTAM 2; 1 G/1; G/1
3 INJECTION, POWDER, FOR SOLUTION INTRAMUSCULAR; INTRAVENOUS EVERY 6 HOURS
Status: DISCONTINUED | OUTPATIENT
Start: 2023-09-20 | End: 2023-09-24 | Stop reason: HOSPADM

## 2023-09-20 RX ORDER — ASPIRIN 81 MG/1
81 TABLET ORAL DAILY
Status: DISCONTINUED | OUTPATIENT
Start: 2023-09-21 | End: 2023-09-24 | Stop reason: HOSPADM

## 2023-09-20 RX ORDER — DOXYCYCLINE 100 MG/10ML
100 INJECTION, POWDER, LYOPHILIZED, FOR SOLUTION INTRAVENOUS ONCE
Status: COMPLETED | OUTPATIENT
Start: 2023-09-20 | End: 2023-09-20

## 2023-09-20 RX ORDER — PANTOPRAZOLE SODIUM 40 MG/1
40 TABLET, DELAYED RELEASE ORAL 2 TIMES DAILY
Status: DISCONTINUED | OUTPATIENT
Start: 2023-09-20 | End: 2023-09-24 | Stop reason: HOSPADM

## 2023-09-20 RX ORDER — ONDANSETRON 2 MG/ML
4 INJECTION INTRAMUSCULAR; INTRAVENOUS EVERY 6 HOURS PRN
Status: DISCONTINUED | OUTPATIENT
Start: 2023-09-20 | End: 2023-09-24 | Stop reason: HOSPADM

## 2023-09-20 RX ORDER — ONDANSETRON 4 MG/1
4 TABLET, ORALLY DISINTEGRATING ORAL EVERY 6 HOURS PRN
Status: DISCONTINUED | OUTPATIENT
Start: 2023-09-20 | End: 2023-09-24 | Stop reason: HOSPADM

## 2023-09-20 RX ORDER — ACETAMINOPHEN 325 MG/1
650 TABLET ORAL EVERY 6 HOURS PRN
Status: DISCONTINUED | OUTPATIENT
Start: 2023-09-20 | End: 2023-09-24 | Stop reason: HOSPADM

## 2023-09-20 RX ORDER — ALLOPURINOL 300 MG/1
300 TABLET ORAL EVERY MORNING
Status: DISCONTINUED | OUTPATIENT
Start: 2023-09-21 | End: 2023-09-24 | Stop reason: HOSPADM

## 2023-09-20 RX ORDER — ACETAMINOPHEN 650 MG/1
650 SUPPOSITORY RECTAL EVERY 6 HOURS PRN
Status: DISCONTINUED | OUTPATIENT
Start: 2023-09-20 | End: 2023-09-24 | Stop reason: HOSPADM

## 2023-09-20 RX ADMIN — SODIUM CHLORIDE 1000 ML: 9 INJECTION, SOLUTION INTRAVENOUS at 14:53

## 2023-09-20 RX ADMIN — PANTOPRAZOLE SODIUM 40 MG: 40 TABLET, DELAYED RELEASE ORAL at 21:48

## 2023-09-20 RX ADMIN — AMPICILLIN SODIUM AND SULBACTAM SODIUM 3 G: 2; 1 INJECTION, POWDER, FOR SOLUTION INTRAMUSCULAR; INTRAVENOUS at 14:38

## 2023-09-20 RX ADMIN — DOXYCYCLINE HYCLATE 100 MG: 100 CAPSULE ORAL at 23:26

## 2023-09-20 RX ADMIN — POLYETHYLENE GLYCOL 400 AND PROPYLENE GLYCOL 2 DROP: 4; 3 SOLUTION/ DROPS OPHTHALMIC at 23:03

## 2023-09-20 RX ADMIN — DOXYCYCLINE 100 MG: 100 INJECTION, POWDER, LYOPHILIZED, FOR SOLUTION INTRAVENOUS at 15:30

## 2023-09-20 RX ADMIN — AMPICILLIN SODIUM AND SULBACTAM SODIUM 3 G: 2; 1 INJECTION, POWDER, FOR SOLUTION INTRAMUSCULAR; INTRAVENOUS at 23:00

## 2023-09-20 ASSESSMENT — ACTIVITIES OF DAILY LIVING (ADL)
ADLS_ACUITY_SCORE: 35

## 2023-09-20 NOTE — ED PROVIDER NOTES
History     Chief Complaint:  Headache       HPI   Ron Gilmore is a 83 year old male with a past medical history significant for COPD, aspiration pneumonia, recurrent UTIs who presents to the ED via/accompanied by EMS with a chief complaint of generalized weakness, cough and headache occurring upon awakening.  Patient reports that he has had a cough somewhat chronically but worsening over the last 1 week.  He is currently at his baseline supplemental oxygen level.  He denies chest pain, abdominal pain.  His catheter continues to drain.  Given concern for possible infection, the patient was referred into the emergency department from [facility].      Independent Historian: History provided by the patient    Review of External Notes: See MDM    ROS:  Review of Systems  Full ROS completed and negative other than pertinent positives and negatives noted in HPI    Allergies:  No Known Allergies     Medications:    acetaminophen (TYLENOL) 325 MG tablet  allopurinol (ZYLOPRIM) 300 MG tablet  aspirin (ASA) 81 MG EC tablet  atorvastatin (LIPITOR) 10 MG tablet  cyanocobalamin (VITAMIN B-12) 500 MCG tablet  Emollient (AMLACTIN ULTRA EX)  formoterol (PERFOROMIST) 20 MCG/2ML neb solution  furosemide (LASIX) 20 MG tablet  ipratropium - albuterol 0.5 mg/2.5 mg/3 mL (DUONEB) 0.5-2.5 (3) MG/3ML neb solution  ipratropium - albuterol 0.5 mg/2.5 mg/3 mL (DUONEB) 0.5-2.5 (3) MG/3ML neb solution  ipratropium-albuterol (COMBIVENT RESPIMAT)  MCG/ACT inhaler  loperamide (IMODIUM) 2 MG capsule  methenamine hippurate (HIPREX) 1 g tablet  metoprolol tartrate (LOPRESSOR) 25 MG tablet  pantoprazole (PROTONIX) 40 MG EC tablet  PARoxetine (PAXIL) 10 MG tablet  PARoxetine (PAXIL) 40 MG tablet  polyethylene glycol (MIRALAX) 17 GM/Dose powder  senna-docusate (SENOKOT-S/PERICOLACE) 8.6-50 MG tablet  senna-docusate (SENOKOT-S/PERICOLACE) 8.6-50 MG tablet  simethicone (MYLICON) 125 MG chewable tablet  Skin Protectants, Misc.  Patient to OR per RN. Patient with daughter at bedside. Patient and daughter's questions answered. Patient on portable monitor, o2 tank, and chart at bedside. Patient transported to OR, where OR anesthesia was met. Patient with stable vital signs. Patient without complaints of pain or shortness of breath.    (LANTISEPTIC SKIN PROTECTANT EX)  Skin Protectants, Misc. (LANTISEPTIC SKIN PROTECTANT EX)        Past Medical History:    Past Medical History:   Diagnosis Date    BPH (benign prostatic hyperplasia)     Cataract     Cholelithiasis     COPD (chronic obstructive pulmonary disease) (H)     Depression     Diabetes mellitus     Dyshidrotic foot dermatitis     Edema     Gout     Hyperlipidemia     Hypertension     Infection due to 2019 novel coronavirus 5/1/2021    Kidney stones     Lumbago     Lumbar disc displacement without myelopathy     Muscle weakness     Neuropathy, diabetic (H)     Obesity     Spinal stenosis     Stroke (H)     Unsteady gait     Urinary retention with incomplete bladder emptying     UTI (urinary tract infection)     Vasovagal episode        Past Surgical History:    Past Surgical History:   Procedure Laterality Date    APPENDECTOMY OPEN      ARTHROSCOPY SHOULDER ROTATOR CUFF REPAIR      cataracts Bilateral     CHOLECYSTECTOMY      COLONOSCOPY  1986    COLONOSCOPY N/A 5/29/2021    Procedure: COLONOSCOPY;  Surgeon: Kofi Davis MD;  Location:  GI    CYSTOSCOPY  10/19/2011    Procedure:CYSTOSCOPY; CYSTOSCOPY, BLADDER STONE REMOVAL; Surgeon:ROB SAWYER; Location: OR    CYSTOSCOPY, TRANSURETHRAL RESECTION (TUR) PROSTATE, COMBINED N/A 2/21/2018    Procedure: COMBINED CYSTOSCOPY, TRANSURETHRAL RESECTION (TUR) PROSTATE;  COMBINED CYSTOSCOPY, TRANSURETHRAL RESECTION (TUR) PROSTATE ;  Surgeon: Rob Sawyer MD;  Location:  OR    EP LOOP RECORDER IMPLANT N/A 1/20/2020    Procedure: EP Loop Recorder Implant;  Surgeon: Evgeny Parisi MD;  Location:  [facility]    ESOPHAGOSCOPY, GASTROSCOPY, DUODENOSCOPY (EGD), COMBINED N/A 5/28/2021    Procedure: ESOPHAGOGASTRODUODENOSCOPY (EGD);  Surgeon: Aurora Waterman MD;  Location:  GI    ESOPHAGOSCOPY, GASTROSCOPY, DUODENOSCOPY (EGD), DILATATION, COMBINED N/A 9/24/2022    Procedure: ESOPHAGOGASTRODUODENOSCOPY, WITH DILATION;   Surgeon: Kofi Davis MD;  Location:  GI    EYE SURGERY      right lid surgery     IR IVC FILTER PLACEMENT  5/24/2021    IR NEPHROSTOMY TUBE PLACEMENT RIGHT  3/9/2021    IR URETERAL STENT PLACEMENT RIGHT  3/16/2021    JOINT REPLACEMENT Right     HIP    KNEE SURGERY Bilateral     LAMINECTOMY LUMBAR ONE LEVEL      LASER HOLMIUM LITHOTRIPSY URETER(S), INSERT STENT, COMBINED Right 4/14/2021    Procedure: CYSTOSCOPY, BLADDER STONE REMOVAL, RIGHT URETEROSCOPY, HOLMIUM LASER LITHOTRIPSY, AND RIGHT STENT REMOVAL, RIGHT RETROGRADE;  Surgeon: Rob Sullivan MD;  Location:  OR    TONSILLECTOMY          Family History:    family history includes Prostate Cancer in his father.    Social History:   reports that he has quit smoking. He has never used smokeless tobacco. He reports that he does not drink alcohol and does not use drugs.  PCP: Kalen Metcalf     Physical Exam   Patient Vitals for the past 24 hrs:   BP Temp Temp src Pulse Resp SpO2 Height Weight   09/20/23 1218 120/74 98.6  F (37  C) Oral 100 18 92 % 1.829 m (6') 99.8 kg (220 lb)        Physical Exam  Constitutional: Well developed, nontox appearance, intermittently coughing  Head: Atraumatic.   Mouth/Throat: Oropharynx is clear and moist.   Neck:  no stridor  Eyes: no scleral icterus  Cardiovascular: Borderline regular tachycardia, 2+ bilat radial pulses  Pulmonary/Chest: nml resp effort, Clear BS bilat  Abdominal: ND, soft, NT, no rebound or guarding   : no CVA tenderness bilat, catheter in place and draining  Ext: Warm, well perfused  Neurological: A&Ox3, symmetric facies, moves ext x4  Skin: Skin is warm and dry.   Psychiatric: Behavior is normal. Thought content normal.   Nursing note and vitals reviewed.    Emergency Department Course   ECG:  ECG results from 05/04/23   EKG 12-lead, tracing only     Value    Systolic Blood Pressure     Diastolic Blood Pressure     Ventricular Rate 106    Atrial Rate 106    AK Interval 162    QRS Duration  132        QTc 486    P Axis 32    R AXIS -2    T Axis 12    Interpretation ECG      Sinus tachycardia  Indeterminate axis  Right bundle branch block  Abnormal ECG  When compared with ECG of 10-APR-2023 12:38,  Premature ventricular complexes are no longer Present  Confirmed by GENERATED REPORT, COMPUTER ([id number]),  DALLAS RODRIGUES ([id number]) on 5/4/2023 8:07:15 PM         Imaging:  CT Head w/o Contrast   Final Result   IMPRESSION:    1. No acute intracranial pathology.   2. Stable brain atrophy and presumed sequela of chronic microvascular   ischemic disease, as detailed.      LUÍS STRICKLAND DO            SYSTEM ID:  [id number]      Chest XR,  PA & LAT   Final Result   IMPRESSION: Shallow inspiration. Opacity at the right lung base   medially is suspicious for pneumonia. The left lung is clear. Loop   recorder device is projected over the left chest laterally. No pleural   effusions are seen, although lateral view is degraded related to   patient body habitus and arm positioning.      BRUNO PAYAN MD            SYSTEM ID:  [id number]           Report per radiology unless otherwise specified in report or noted in MDM    Laboratory:  Labs Ordered and Resulted from Time of ED Arrival to Time of ED Departure   COMPREHENSIVE METABOLIC PANEL - Abnormal       Result Value    Sodium 135 (*)     Potassium 3.5      Chloride 93 (*)     Carbon Dioxide (CO2) 30 (*)     Anion Gap 12      Urea Nitrogen 16.9      Creatinine 0.87      Calcium 9.2      Glucose 167 (*)     Alkaline Phosphatase 73      AST 14      ALT 12      Protein Total 7.5      Albumin 3.3 (*)     Bilirubin Total 0.8      GFR Estimate 87     ROUTINE UA WITH MICROSCOPIC REFLEX TO CULTURE - Abnormal    Color Urine Light Yellow      Appearance Urine Slightly Cloudy (*)     Glucose Urine Negative      Bilirubin Urine Negative      Ketones Urine Negative      Specific Gravity Urine 1.011      Blood Urine Negative      pH Urine 7.0      Protein Albumin Urine Negative       Urobilinogen Urine Normal      Nitrite Urine Negative      Leukocyte Esterase Urine Large (*)     Bacteria Urine Few (*)     Mucus Urine Present (*)     Amorphous Crystals Urine Few (*)     RBC Urine 4 (*)     WBC Urine 25 (*)     Hyaline Casts Urine 2     CBC WITH PLATELETS AND DIFFERENTIAL - Abnormal    WBC Count 10.4      RBC Count 3.94 (*)     Hemoglobin 12.1 (*)     Hematocrit 39.3 (*)           MCH 30.7      MCHC 30.8 (*)     RDW 15.1 (*)     Platelet Count 198      % Neutrophils 81      % Lymphocytes 9      % Monocytes 8      % Eosinophils 2      % Basophils 0      % Immature Granulocytes 0      NRBCs per 100 WBC 0      Absolute Neutrophils 8.3      Absolute Lymphocytes 0.9      Absolute Monocytes 0.9      Absolute Eosinophils 0.3      Absolute Basophils 0.0      Absolute Immature Granulocytes 0.0      Absolute NRBCs 0.0     ISTAT GASES LACTATE VENOUS POCT - Abnormal    Lactic Acid POCT 1.9      Bicarbonate Venous POCT 35 (*)     O2 Sat, Venous POCT 28 (*)     pCO2 Venous POCT 61 (*)     pH Venous POCT 7.37      pO2 Venous POCT 20 (*)    AMMONIA - Normal    Ammonia 16     MAGNESIUM - Normal    Magnesium 1.9     TSH WITH FREE T4 REFLEX - Normal    TSH 1.12     INFLUENZA A/B, RSV, & SARS-COV2 PCR - Normal    Influenza A PCR Negative      Influenza B PCR Negative      RSV PCR Negative      SARS CoV2 PCR Negative     NT PROBNP INPATIENT - Normal    N terminal Pro BNP Inpatient 215     URINE CULTURE        Procedures       Emergency Department Course & Assessments:             Interventions:  Medications   ampicillin-sulbactam (UNASYN) 3 g vial to attach to  mL bag (3 g Intravenous $New Bag 9/20/23 2678)   doxycycline (VIBRAMYCIN) 100 mg vial to attach to  mL bag (has no administration in time range)   sodium chloride 0.9% BOLUS 1,000 mL (has no administration in time range)        Independent Interpretation (X-rays, CTs, rhythm strip):  See MDM    Consultations/Discussion of Management or  Tests:  I discussed the patient case with admitting hospitalist service    Social Determinants of Health affecting care:  See MDM    Disposition:  The patient was admitted to the hospital under the care of Dr. Ray.     Impression & Plan    CMS Diagnoses:     Medical Decision Makin year old male presenting w/ generalized weakness, cough    Social determinants affecting patient's health include: Age increasing risk for presentation to the emergency department, lives in community long-term care facility potentially increasing risk for infection and presentation to the emergency department     I reviewed medical records from hospital admission on 2023, and hospital admission on 4/10/2023    DDx includes viral syndrome NOS, influenza-like illness, influenza, COVID-19, UTI, sepsis, severe sepsis, bacteremia, intracranial hemorrhage although less likely.  Doubt meningitis, encephalitis given history and physical exam.  Labs significant for normal white blood cell count, COVID-19 negative UA improved from previous.  Imaging sig for no pneumothorax my independent interpretation, right lower lobe opacity on radiology read.  Upon review of the patient's previous urine cultures, they have had mixed urogenital patty and Candida.  Given the patient is afebrile with normal white count, I do not feel he has an acute UTI that needs to be treated.  I do think would be appropriate to treat the patient for aspiration pneumonia given his respiratory symptoms and consideration of his chest x-ray with right lower lobe pneumonia in addition to his history of aspiration.  Unasyn and doxycycline ordered as noted above.  Given the patient's high risk for decompensation, he was admitted to the hospital service under observation for further evaluation and management.  Patient counseled on all results, disposition and diagnosis.  They are understanding and agreeable to plan. Patient admitted in guarded condition.        Diagnosis:     ICD-10-CM    1. Aspiration pneumonia of right lower lobe, unspecified aspiration pneumonia type (H)  J69.0            Discharge Medications:  New Prescriptions    No medications on file        9/20/2023   Barron Lino MD Vaughn, Christopher E, MD  09/20/23 0432

## 2023-09-20 NOTE — PHARMACY-ADMISSION MEDICATION HISTORY
Pharmacist Admission Medication History    Admission medication history is complete. The information provided in this note is only as accurate as the sources available at the time of the update.    Medication reconciliation/reorder completed by provider prior to medication history? No    Information Source(s): Facility ([facility] medication list/MAR via     Pertinent Information:     Changes made to PTA medication list:  Added: None  Deleted: None  Changed: None    Medication Affordability:       Allergies reviewed with patient and updates made in EHR: no    Medication History Completed By: Mercy Tyson RPH 9/20/2023 3:52 PM    Prior to Admission medications    Medication Sig Last Dose Taking? Auth Provider Long Term End Date   acetaminophen (TYLENOL) 325 MG tablet Take 650 mg by mouth every 4 hours as needed for mild pain as needed for pain/fever Max dose 3000mg/24hr  Yes Reported, Patient     allopurinol (ZYLOPRIM) 300 MG tablet Take 300 mg by mouth every morning 9/20/2023 at 0900 Yes Unknown, Entered By History     aspirin (ASA) 81 MG EC tablet Take 1 tablet (81 mg) by mouth daily 9/20/2023 at 0900 Yes Rg Tobin,      atorvastatin (LIPITOR) 10 MG tablet Take 10 mg by mouth every morning 9/20/2023 at 0900 Yes Unknown, Entered By History No    Emollient (AMLACTIN ULTRA EX) Apply topically daily as needed TO FEET  Yes Reported, Patient     formoterol (PERFOROMIST) 20 MCG/2ML neb solution Take 2 mLs (20 mcg) by nebulization every 12 hours for 60 days 9/19/2023 at 2300 Yes Romulo Cavanaugh MD Yes 11/2/23   furosemide (LASIX) 20 MG tablet Take 1.5 tablets (30 mg) by mouth daily 9/20/2023 at 0900 Yes Sue Estevez MD Yes    hypromellose (ARTIFICIAL TEARS) 0.5 % SOLN ophthalmic solution Place 2 drops into both eyes 2 times daily 9/20/2023 at 0900 Yes Unknown, Entered By History     hypromellose (ARTIFICIAL TEARS) 0.5 % SOLN ophthalmic solution Place 2 drops into both eyes 2 times daily as needed  for dry eyes  Yes Unknown, Entered By History     ipratropium - albuterol 0.5 mg/2.5 mg/3 mL (DUONEB) 0.5-2.5 (3) MG/3ML neb solution Take 1 vial by nebulization daily as needed for shortness of breath, wheezing or cough Unknown at PRN Yes Unknown, Entered By History No    ipratropium - albuterol 0.5 mg/2.5 mg/3 mL (DUONEB) 0.5-2.5 (3) MG/3ML neb solution Take 1 vial by nebulization 3 times daily 0900, 1400, 2100 9/20/2023 at 0900 Yes Unknown, Entered By History No    ipratropium-albuterol (COMBIVENT RESPIMAT)  MCG/ACT inhaler Inhale 1 puff into the lungs 4 times daily 0900, 1200 ,1600 ,2000 9/20/2023 at 0900 Yes Reported, Patient No    loperamide (IMODIUM) 2 MG capsule Take 2 mg by mouth every 6 hours as needed for diarrhea Unknown at PRN Yes Unknown, Entered By History     methenamine hippurate (HIPREX) 1 g tablet Take 1 g by mouth 2 times daily 9/20/2023 Yes Unknown, Entered By History     metoprolol tartrate (LOPRESSOR) 25 MG tablet Take 0.5 tablets (12.5 mg) by mouth 2 times daily 9/20/2023 Yes Romulo Cavanaugh MD Yes    pantoprazole (PROTONIX) 40 MG EC tablet Take 40 mg by mouth 2 times daily 9/20/2023 Yes Unknown, Entered By History     PARoxetine (PAXIL) 10 MG tablet Take 10 mg by mouth every morning Take with 40mg tablet to equal 50mg total 9/20/2023 Yes Unknown, Entered By History No    PARoxetine (PAXIL) 40 MG tablet Take 40 mg by mouth every morning Take with 10mg tablet to equal 50mg total 9/20/2023 Yes Unknown, Entered By History No    senna-docusate (SENOKOT-S/PERICOLACE) 8.6-50 MG tablet Take 1 tablet by mouth daily 9/20/2023 Yes Unknown, Entered By History     senna-docusate (SENOKOT-S/PERICOLACE) 8.6-50 MG tablet Take 1 tablet by mouth daily as needed for constipation  Yes Unknown, Entered By History     simethicone (MYLICON) 125 MG chewable tablet Take 125 mg by mouth 3 times daily as needed for intestinal gas  Yes Unknown, Entered By History     Skin Protectants, Misc. (LANTISEPTIC SKIN  PROTECTANT EX) Externally apply topically 2 times daily as needed Apply a thin film to vincent area/buttocks  Yes Unknown, Entered By History     Skin Protectants, Misc. (LANTISEPTIC SKIN PROTECTANT EX) Externally apply topically 2 times daily Apply a thin film to vincent area/buttocks 9/20/2023 Yes Unknown, Entered By History     Vitamin D3 (VITAMIN D, CHOLECALCIFEROL,) 25 mcg (1000 units) tablet Take 1 tablet by mouth daily 9/20/2023 Yes Unknown, Entered By History     polyethylene glycol (MIRALAX) 17 GM/Dose powder Take 17 g by mouth daily  Patient not taking: Reported on 5/4/2023   Romulo Cavanaugh MD Marci Jacobson PharmD

## 2023-09-20 NOTE — H&P
[facility]    History and Physical - Hospitalist Service       Date of Admission:  9/20/2023    Assessment & Plan        Ron Gilmore is an 83 year old male with COPD, chronically on 2 L of oxygen, hypertension, CVA, chronic diastolic CHF with preserved EF, oropharyngeal dysphagia, aspiration pneumonia, chronic urinary retention with chronic Cardona in place, resident of assisted living who presents on 9/20/2023 with generalized weakness and worsening cough.  Work-up showed right lung base infiltrate concerning for pneumonia.  UA mildly abnormal.      Right lung base pneumonia, likely aspiration pneumonia  Oropharyngeal dysphagia  -Has known moderate oropharyngeal dysphagia and is on minced and moist diet with mildly thick liquids  -Has chronic cough which has worsened in the last few days.  Reports he bled like he had a pneumonia.  -Afebrile, no leukocytosis, normal lactate.  COVID-19/influenza/RSV negative  -Chest x-ray showed right basilar infiltrate concerning for pneumonia  -Received IV Unasyn and doxycycline in ER, will continue  -Speech therapy evaluation      COPD  Chronic hypoxic and hypercapnic respiratory failure, on 2 L of oxygen at baseline  -Does not seem to have COPD exacerbation.  Oxygen requirements are stable.  Saturations 92-97% on usual 2 L of oxygen  -VBG's showed normal pH of 7.37, mildly elevated PCO2 of 61  -Continue usual  nebs-formoterol, DuoNebs  -Continue supplemental oxygen at 2 L/min      Essential hypertension  -Continue metoprolol 12.5 mg twice daily    Chronic diastolic CHF with preserved EF of 55-60%, echo 4/2023  -Appears euvolemic  -Continue Lasix 30 mg daily    Dyslipidemia  -Resume atorvastatin after discharge    Previous CVA  -Continue aspirin 81 mg daily    Depression  -Stable.  Continue paroxetine 50 mg daily    Diabetes mellitus type 2, diet controlled, with neuropathy  -Not on any medications.  No need to check blood sugars    GERD  -Continue  Protonix    Chronic gout  -No acute exacerbation.  Continue allopurinol               Diet:  Minced and moist diet with mildly thick liquids  DVT Prophylaxis: Low Risk/Ambulatory with no VTE prophylaxis indicated  Cardona Catheter: Not present  Lines: None     Cardiac Monitoring: None  Code Status:  DNR/DNI    Clinically Significant Risk Factors Present on Admission              # Hypoalbuminemia: Lowest albumin = 3.3 g/dL at 9/20/2023 12:41 PM, will monitor as appropriate   # Drug Induced Platelet Defect: home medication list includes an antiplatelet medication       # DMII: A1C = N/A within past 6 months    # Overweight: Estimated body mass index is 29.84 kg/m  as calculated from the following:    Height as of this encounter: 1.829 m (6').    Weight as of this encounter: 99.8 kg (220 lb).       # COPD: noted on problem list        Disposition Plan      Expected Discharge Date: 09/21/2023                  July Ray MD  Hospitalist Service  [facility]  Securely message with [employer] (more info)  Text page via [employer] Paging/Directory     ______________________________________________________________________    Chief Complaint     Cough, generalized weakness    History is obtained from the patient    History of Present Illness     Ron Gilmore is an 83 year old male with COPD, chronically on 2 L of oxygen, hypertension, CVA, chronic diastolic CHF with preserved EF, oropharyngeal dysphagia, aspiration pneumonia, chronic urinary retention with chronic Cardona in place, resident of assisted living who presents on 9/20/2023 with generalized weakness and worsening cough.    Patient reports that he has a cough at baseline but in the last few days his cough has significantly worsened.  He has been feeling weaker than usual.  Mentions that his lungs sound different and he is suspicious that he has a pneumonia.  Reports he is also been feeling goofy.    He denies any fever or chills.  No nausea or  vomiting.  No abdominal pain or dysuria.    Heart rate was mildly elevated at 104 on admission.  Oxygen saturations 92-97 percent on usual 2 L of oxygen, afebrile, normal blood pressure.    Labs showed no leukocytosis, normal lactate     UA showed   25 WBCs and large leukocyte esterase.  Cardona catheter was last changed on 9/1/2023    Chest x-ray showed right lung base opacity concerning for pneumonia.    Head CT showed no acute changes    Due to concerns of aspiration pneumonia, he was administered IV Unasyn and doxycycline in the ER.        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    Unsteady gait     Urinary retention with incomplete bladder emptying     UTI (urinary tract infection)     Vasovagal episode        Past Surgical History   Past Surgical History:   Procedure Laterality Date    APPENDECTOMY OPEN      ARTHROSCOPY SHOULDER ROTATOR CUFF REPAIR      cataracts Bilateral     CHOLECYSTECTOMY      COLONOSCOPY  1986    COLONOSCOPY N/A 5/29/2021    Procedure: COLONOSCOPY;  Surgeon: Kofi Davis MD;  Location:  GI    CYSTOSCOPY  10/19/2011    Procedure:CYSTOSCOPY; CYSTOSCOPY, BLADDER STONE REMOVAL; Surgeon:IRVIN SAWYER; Location: OR    CYSTOSCOPY, TRANSURETHRAL RESECTION (TUR) PROSTATE, COMBINED N/A 2/21/2018    Procedure: COMBINED CYSTOSCOPY, TRANSURETHRAL RESECTION (TUR) PROSTATE;  COMBINED CYSTOSCOPY, TRANSURETHRAL RESECTION (TUR) PROSTATE  ;  Surgeon: Rob Sullivan MD;  Location:  OR    EP LOOP RECORDER IMPLANT N/A 1/20/2020    Procedure: EP Loop Recorder Implant;  Surgeon: Evgeny Parisi MD;  Location:  HEART CARDIAC CATH LAB    ESOPHAGOSCOPY, GASTROSCOPY, DUODENOSCOPY (EGD), COMBINED N/A 5/28/2021    Procedure: ESOPHAGOGASTRODUODENOSCOPY (EGD);  Surgeon: Aurora Waterman MD;  Location:  GI    ESOPHAGOSCOPY, GASTROSCOPY, DUODENOSCOPY (EGD), DILATATION, COMBINED N/A 9/24/2022    Procedure: ESOPHAGOGASTRODUODENOSCOPY, WITH DILATION;  Surgeon: Kfoi Davis MD;  Location:  GI    EYE SURGERY      right lid surgery     IR IVC FILTER PLACEMENT  5/24/2021    IR NEPHROSTOMY TUBE PLACEMENT RIGHT  3/9/2021    IR URETERAL STENT PLACEMENT RIGHT  3/16/2021    JOINT REPLACEMENT Right     HIP    KNEE SURGERY Bilateral     LAMINECTOMY LUMBAR ONE LEVEL      LASER HOLMIUM LITHOTRIPSY URETER(S), INSERT STENT, COMBINED Right 4/14/2021    Procedure: CYSTOSCOPY, BLADDER STONE REMOVAL, RIGHT URETEROSCOPY, HOLMIUM LASER LITHOTRIPSY, AND RIGHT STENT REMOVAL, RIGHT RETROGRADE;  Surgeon: Rob Sullivan MD;  Location:  OR    TONSILLECTOMY         Prior to Admission Medications   Prior to Admission Medications   Prescriptions Last Dose Informant Patient Reported? Taking?   Emollient (AMLACTIN ULTRA EX)  Nursing Home Yes No   Sig: Apply topically daily as needed TO FEET   PARoxetine (PAXIL) 10 MG tablet  Nursing Home Yes No   Sig: Take 10 mg by mouth every morning Take with 40mg tablet to equal 50mg total   PARoxetine (PAXIL) 40 MG tablet  Nursing Home Yes No   Sig: Take 40 mg by mouth every morning Take with 10mg tablet to equal 50mg total   Skin Protectants, Misc. (LANTISEPTIC SKIN PROTECTANT EX)  Nursing Home Yes No   Sig: Externally apply topically 2 times daily as needed Apply a thin film to vincent area/buttocks   Skin Protectants, Misc. (LANTISEPTIC SKIN PROTECTANT EX)  Nursing Home Yes No   Sig: Externally apply topically 2 times daily  Apply a thin film to vincent area/buttocks   acetaminophen (TYLENOL) 325 MG tablet  Nursing Home Yes No   Sig: Take 650 mg by mouth every 4 hours as needed for mild pain as needed for pain/fever Max dose 3000mg/24hr   allopurinol (ZYLOPRIM) 300 MG tablet  Nursing Home Yes No   Sig: Take 300 mg by mouth every morning   aspirin (ASA) 81 MG EC tablet  Nursing Home No No   Sig: Take 1 tablet (81 mg) by mouth daily   atorvastatin (LIPITOR) 10 MG tablet  Nursing Home Yes No   Sig: Take 10 mg by mouth every morning   cyanocobalamin (VITAMIN B-12) 500 MCG tablet  Nursing Home Yes No   Sig: Take 500 mcg by mouth every morning   formoterol (PERFOROMIST) 20 MCG/2ML neb solution   No No   Sig: Take 2 mLs (20 mcg) by nebulization every 12 hours for 60 days   furosemide (LASIX) 20 MG tablet  Nursing Home No No   Sig: Take 1.5 tablets (30 mg) by mouth daily   ipratropium - albuterol 0.5 mg/2.5 mg/3 mL (DUONEB) 0.5-2.5 (3) MG/3ML neb solution  Nursing Home Yes No   Sig: Take 1 vial by nebulization daily as needed for shortness of breath, wheezing or cough   ipratropium - albuterol 0.5 mg/2.5 mg/3 mL (DUONEB) 0.5-2.5 (3) MG/3ML neb solution  Nursing Home Yes No   Sig: Take 1 vial by nebulization 3 times daily 0900, 1400, 2100   ipratropium-albuterol (COMBIVENT RESPIMAT)  MCG/ACT inhaler  Nursing Home Yes No   Sig: Inhale 1 puff into the lungs 4 times daily 0900, 1200 ,1600 ,2000   loperamide (IMODIUM) 2 MG capsule  Nursing Home Yes No   Sig: Take 2 mg by mouth every 6 hours as needed for diarrhea   methenamine hippurate (HIPREX) 1 g tablet  Nursing Home Yes No   Sig: Take 1 g by mouth 2 times daily   metoprolol tartrate (LOPRESSOR) 25 MG tablet  Nursing Home No No   Sig: Take 0.5 tablets (12.5 mg) by mouth 2 times daily   pantoprazole (PROTONIX) 40 MG EC tablet  Nursing Home Yes No   Sig: Take 40 mg by mouth 2 times daily   polyethylene glycol (MIRALAX) 17 GM/Dose powder  Nursing Home No No   Sig: Take 17 g by mouth daily    Patient not taking: Reported on 5/4/2023   senna-docusate (SENOKOT-S/PERICOLACE) 8.6-50 MG tablet  Nursing Home Yes No   Sig: Take 1 tablet by mouth daily   senna-docusate (SENOKOT-S/PERICOLACE) 8.6-50 MG tablet  Nursing Home Yes No   Sig: Take 1 tablet by mouth daily as needed for constipation   simethicone (MYLICON) 125 MG chewable tablet  Nursing Home Yes No   Sig: Take 125 mg by mouth 3 times daily as needed for intestinal gas      Facility-Administered Medications: None        Social History   I have reviewed this patient's social history and updated it with pertinent information if needed.  Social History     Tobacco Use    Smoking status: Former    Smokeless tobacco: Never   Substance Use Topics    Alcohol use: No     Comment: none for 29 yrs    Drug use: No         Family History   I have reviewed this patient's family history and updated it with pertinent information if needed.  Family History   Problem Relation Age of Onset    Prostate Cancer Father         Physical Exam   Vital Signs: Temp: 98.6  F (37  C) Temp src: Oral BP: 120/74 Pulse: 100   Resp: 18 SpO2: 97 % O2 Device: Nasal cannula Oxygen Delivery: 2 LPM  Weight: 220 lbs 0 oz    Constitutional - alert, resting in bed, appears comfortable  Head - normocephalic, atraumatic  ENT - normal eye lids and lashes, no conjunctival hyperemia, no icterus, extraocular movements are normal, normal nose, no discharge, moist oral mucosa, no ulcers or exudates, normal external ear  Neck - no thyromegaly or lymphadenopathy. Tracheal is midline  CV - regular rate and rhythm, no murmurs, no edema  Pulmonary -bilateral rhonchi, no wheezing   GI - abdomen is soft, non distended, non tender, bowel sounds are present, no organomegaly  Neurological - alert and oriented, normal speech  Musculoskeletal - no joint erythema or swelling, ROM is ok          Medical Decision Making       60 MINUTES SPENT BY ME on the date of service doing chart review, history, exam,  documentation & further activities per the note.      Data     I have personally reviewed the following data over the past 24 hrs:    10.4  \   12.1 (L)   / 198     135 (L) 93 (L) 16.9 /  167 (H)   3.5 30 (H) 0.87 \     ALT: 12 AST: 14 AP: 73 TBILI: 0.8   ALB: 3.3 (L) TOT PROTEIN: 7.5 LIPASE: N/A     Trop: N/A BNP: 215     TSH: 1.12 T4: N/A A1C: N/A     Procal: N/A CRP: N/A Lactic Acid: 1.9         Imaging results reviewed over the past 24 hrs:   Recent Results (from the past 24 hour(s))   Chest XR,  PA & LAT    Narrative    CHEST TWO VIEWS September 20, 2023 1:01 PM     HISTORY: Worsening cough.    COMPARISON: 5/4/2023.      Impression    IMPRESSION: Shallow inspiration. Opacity at the right lung base  medially is suspicious for pneumonia. The left lung is clear. Loop  recorder device is projected over the left chest laterally. No pleural  effusions are seen, although lateral view is degraded related to  patient body habitus and arm positioning.    BRUNO PAYAN MD         SYSTEM ID:  [id number]   CT Head w/o Contrast    Narrative    EXAM: CT HEAD W/O CONTRAST  9/20/2023 1:19 PM     HISTORY: altered mental status       COMPARISON: 1/30/2023    TECHNIQUE: Using multidetector thin collimation helical acquisition  technique, axial, coronal and sagittal CT images from the skull base  to the vertex were obtained without intravenous contrast.   (topogram) image(s) also obtained and reviewed. Dose reduction  techniques were used.    FINDINGS:  No acute intracranial hemorrhage, mass effect, or midline shift.  Stable subcortical, periventricular areas of white matter  hypoattenuation. Mild parenchymal volume loss, compensatory  ventricular dilatation. Stable bilateral basal ganglia lacunar  infarcts. No CT findings of acute infarct or hydrocephalus. Preserved  subarachnoid spaces.    Atraumatic calvarium. No substantial paranasal sinus mucosal disease.  Clear mastoid air cells. Nonfocal orbits.       Impression     IMPRESSION:   1. No acute intracranial pathology.  2. Stable brain atrophy and presumed sequela of chronic microvascular  ischemic disease, as detailed.    LUÍS STRICKLAND DO         SYSTEM ID:  [id number]

## 2023-09-20 NOTE — ED NOTES
[facility]  ED Nurse Handoff Report    ED Chief complaint: Headache      ED Diagnosis:   Final diagnoses:   Aspiration pneumonia of right lower lobe, unspecified aspiration pneumonia type (H)       Code Status: To be addressed by admitting provider    Allergies: No Known Allergies    Patient Story: Patient comes from [facility] for headache and possible infection. Patients PCP had concerns of possible UTI and aspiration pneumonia.   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               Family Comments: Patient daughter primary contact and medical decision maker  OBS brochure/video discussed/provided to patient/family: N/A              Name of person given brochure if not patient:               Relationship to patient:     For the majority of the shift this patient's behavior was Green.   Behavioral interventions performed were n/a.    ED NURSE PHONE NUMBER: [phone]

## 2023-09-20 NOTE — ED NOTES
Bed: ED27  Expected date:   Expected time:   Means of arrival:   Comments:  Saira - 83 M confusion uti eta 1210

## 2023-09-20 NOTE — ED TRIAGE NOTES
Patient comes from nursing home. Per EMS patient was seen by primary provider, concerns of possible infection, hx uti possible aspiration pneumonia. Pt bedbound, has suprapubic catheter, 2L NC at baseline. Aox4, patient complains of generalized headache/ pressure     Triage Assessment       Row Name 09/20/23 1220       Triage Assessment (Adult)    Airway WDL WDL       Respiratory WDL    Respiratory WDL X;cough    Cough Frequency frequent    Cough Type nonproductive       Skin Circulation/Temperature WDL    Skin Circulation/Temperature WDL WDL       Cardiac WDL    Cardiac WDL WDL       Peripheral/Neurovascular WDL    Peripheral Neurovascular WDL WDL       Cognitive/Neuro/Behavioral WDL    Cognitive/Neuro/Behavioral WDL WDL

## 2023-09-21 ENCOUNTER — APPOINTMENT (OUTPATIENT)
Dept: SPEECH THERAPY | Facility: CLINIC | Age: 83
DRG: 178 | End: 2023-09-21
Attending: INTERNAL MEDICINE
Payer: MEDICARE

## 2023-09-21 LAB
ANION GAP SERPL CALCULATED.3IONS-SCNC: 11 MMOL/L (ref 7–15)
BUN SERPL-MCNC: 16 MG/DL (ref 8–23)
CALCIUM SERPL-MCNC: 9.1 MG/DL (ref 8.8–10.2)
CHLORIDE SERPL-SCNC: 99 MMOL/L (ref 98–107)
CREAT SERPL-MCNC: 0.81 MG/DL (ref 0.67–1.17)
DEPRECATED HCO3 PLAS-SCNC: 28 MMOL/L (ref 22–29)
EGFRCR SERPLBLD CKD-EPI 2021: 89 ML/MIN/1.73M2
ERYTHROCYTE [DISTWIDTH] IN BLOOD BY AUTOMATED COUNT: 15 % (ref 10–15)
GLUCOSE SERPL-MCNC: 132 MG/DL (ref 70–99)
HCT VFR BLD AUTO: 36.8 % (ref 40–53)
HGB BLD-MCNC: 11.5 G/DL (ref 13.3–17.7)
MCH RBC QN AUTO: 30.5 PG (ref 26.5–33)
MCHC RBC AUTO-ENTMCNC: 31.3 G/DL (ref 31.5–36.5)
MCV RBC AUTO: 98 FL (ref 78–100)
PLATELET # BLD AUTO: 177 10E3/UL (ref 150–450)
POTASSIUM SERPL-SCNC: 4 MMOL/L (ref 3.4–5.3)
RBC # BLD AUTO: 3.77 10E6/UL (ref 4.4–5.9)
SODIUM SERPL-SCNC: 138 MMOL/L (ref 136–145)
WBC # BLD AUTO: 10 10E3/UL (ref 4–11)

## 2023-09-21 PROCEDURE — 250N000013 HC RX MED GY IP 250 OP 250 PS 637: Performed by: INTERNAL MEDICINE

## 2023-09-21 PROCEDURE — 999N000157 HC STATISTIC RCP TIME EA 10 MIN

## 2023-09-21 PROCEDURE — 99232 SBSQ HOSP IP/OBS MODERATE 35: CPT | Performed by: INTERNAL MEDICINE

## 2023-09-21 PROCEDURE — 92610 EVALUATE SWALLOWING FUNCTION: CPT | Mod: GN | Performed by: REHABILITATION PRACTITIONER

## 2023-09-21 PROCEDURE — 94640 AIRWAY INHALATION TREATMENT: CPT

## 2023-09-21 PROCEDURE — 85027 COMPLETE CBC AUTOMATED: CPT | Performed by: INTERNAL MEDICINE

## 2023-09-21 PROCEDURE — 250N000009 HC RX 250: Performed by: INTERNAL MEDICINE

## 2023-09-21 PROCEDURE — 250N000011 HC RX IP 250 OP 636: Performed by: INTERNAL MEDICINE

## 2023-09-21 PROCEDURE — 120N000001 HC R&B MED SURG/OB

## 2023-09-21 PROCEDURE — 36415 COLL VENOUS BLD VENIPUNCTURE: CPT | Performed by: INTERNAL MEDICINE

## 2023-09-21 PROCEDURE — 82310 ASSAY OF CALCIUM: CPT | Performed by: INTERNAL MEDICINE

## 2023-09-21 PROCEDURE — G0378 HOSPITAL OBSERVATION PER HR: HCPCS

## 2023-09-21 PROCEDURE — 96376 TX/PRO/DX INJ SAME DRUG ADON: CPT

## 2023-09-21 RX ADMIN — FORMOTEROL FUMARATE DIHYDRATE 20 MCG: 20 SOLUTION RESPIRATORY (INHALATION) at 19:52

## 2023-09-21 RX ADMIN — METOPROLOL TARTRATE 12.5 MG: 25 TABLET, FILM COATED ORAL at 08:30

## 2023-09-21 RX ADMIN — PAROXETINE HYDROCHLORIDE 50 MG: 10 TABLET, FILM COATED ORAL at 11:40

## 2023-09-21 RX ADMIN — AMPICILLIN SODIUM AND SULBACTAM SODIUM 3 G: 2; 1 INJECTION, POWDER, FOR SOLUTION INTRAMUSCULAR; INTRAVENOUS at 05:03

## 2023-09-21 RX ADMIN — PANTOPRAZOLE SODIUM 40 MG: 40 TABLET, DELAYED RELEASE ORAL at 08:30

## 2023-09-21 RX ADMIN — AMPICILLIN SODIUM AND SULBACTAM SODIUM 3 G: 2; 1 INJECTION, POWDER, FOR SOLUTION INTRAMUSCULAR; INTRAVENOUS at 11:43

## 2023-09-21 RX ADMIN — ACETAMINOPHEN 650 MG: 325 TABLET, FILM COATED ORAL at 14:15

## 2023-09-21 RX ADMIN — SENNOSIDES AND DOCUSATE SODIUM 1 TABLET: 50; 8.6 TABLET ORAL at 08:28

## 2023-09-21 RX ADMIN — ASPIRIN 81 MG: 81 TABLET, COATED ORAL at 08:30

## 2023-09-21 RX ADMIN — AMPICILLIN SODIUM AND SULBACTAM SODIUM 3 G: 2; 1 INJECTION, POWDER, FOR SOLUTION INTRAMUSCULAR; INTRAVENOUS at 22:13

## 2023-09-21 RX ADMIN — POLYETHYLENE GLYCOL 400 AND PROPYLENE GLYCOL 2 DROP: 4; 3 SOLUTION/ DROPS OPHTHALMIC at 08:31

## 2023-09-21 RX ADMIN — METOPROLOL TARTRATE 12.5 MG: 25 TABLET, FILM COATED ORAL at 21:00

## 2023-09-21 RX ADMIN — POLYETHYLENE GLYCOL 400 AND PROPYLENE GLYCOL 2 DROP: 4; 3 SOLUTION/ DROPS OPHTHALMIC at 21:00

## 2023-09-21 RX ADMIN — DOXYCYCLINE HYCLATE 100 MG: 100 CAPSULE ORAL at 08:28

## 2023-09-21 RX ADMIN — AMPICILLIN SODIUM AND SULBACTAM SODIUM 3 G: 2; 1 INJECTION, POWDER, FOR SOLUTION INTRAMUSCULAR; INTRAVENOUS at 16:32

## 2023-09-21 RX ADMIN — FUROSEMIDE 30 MG: 20 TABLET ORAL at 08:29

## 2023-09-21 RX ADMIN — PANTOPRAZOLE SODIUM 40 MG: 40 TABLET, DELAYED RELEASE ORAL at 21:00

## 2023-09-21 RX ADMIN — ALLOPURINOL 300 MG: 300 TABLET ORAL at 08:30

## 2023-09-21 RX ADMIN — DOXYCYCLINE HYCLATE 100 MG: 100 CAPSULE ORAL at 21:00

## 2023-09-21 ASSESSMENT — ACTIVITIES OF DAILY LIVING (ADL)
DRESS: 2-->COMPLETELY DEPENDENT (NOT DEVELOPMENTALLY APPROPRIATE)
FALL_HISTORY_WITHIN_LAST_SIX_MONTHS: NO
SWALLOWING: 2-->DIFFICULTY SWALLOWING LIQUIDS
WALKING_OR_CLIMBING_STAIRS: TRANSFERRING DIFFICULTY, REQUIRES EQUIPMENT;TRANSFERRING DIFFICULTY, DEPENDENT
CONCENTRATING,_REMEMBERING_OR_MAKING_DECISIONS_DIFFICULTY: NO
TRANSFERRING: 2-->COMPLETELY DEPENDENT
TRANSFERRING: 2-->COMPLETELY DEPENDENT (NOT DEVELOPMENTALLY APPROPRIATE)
TOILETING: 2-->COMPLETELY DEPENDENT (NOT DEVELOPMENTALLY APPROPRIATE)
ADLS_ACUITY_SCORE: 35
SWALLOWING: 2-->DIFFICULTY SWALLOWING LIQUIDS
DRESSING/BATHING_DIFFICULTY: YES
DRESSING/BATHING: BATHING DIFFICULTY, DEPENDENT
ADLS_ACUITY_SCORE: 43
ADLS_ACUITY_SCORE: 35
EQUIPMENT_CURRENTLY_USED_AT_HOME: WHEELCHAIR, POWER
BATHING: 2-->COMPLETELY DEPENDENT (NOT DEVELOPMENTALLY APPROPRIATE)
DIFFICULTY_EATING/SWALLOWING: YES
ADLS_ACUITY_SCORE: 45
DRESS: 2-->COMPLETELY DEPENDENT
ADLS_ACUITY_SCORE: 47
EATING: 1-->ASSISTANCE (EQUIPMENT/PERSON) NEEDED (NOT DEVELOPMENTALLY APPROPRIATE)
ADLS_ACUITY_SCORE: 43
WEAR_GLASSES_OR_BLIND: NO
ADLS_ACUITY_SCORE: 43
ADLS_ACUITY_SCORE: 35
ADLS_ACUITY_SCORE: 35
ADLS_ACUITY_SCORE: 45
EATING: 1-->ASSISTANCE (EQUIPMENT/PERSON) NEEDED
TOILETING: 2-->COMPLETELY DEPENDENT
ADLS_ACUITY_SCORE: 35
EATING/SWALLOWING: SWALLOWING LIQUIDS;EATING
ADLS_ACUITY_SCORE: 43
DOING_ERRANDS_INDEPENDENTLY_DIFFICULTY: YES
TOILETING_ISSUES: YES
WALKING_OR_CLIMBING_STAIRS_DIFFICULTY: YES
CHANGE_IN_FUNCTIONAL_STATUS_SINCE_ONSET_OF_CURRENT_ILLNESS/INJURY: NO
TOILETING_ASSISTANCE: TOILETING DIFFICULTY, ASSISTANCE 1 PERSON;TOILETING DIFFICULTY, DEPENDENT

## 2023-09-21 NOTE — PROVIDER NOTIFICATION
MD Notification    Notified Person: MD    Notified Person Name:Jose George MD     Notification Date/Time:1300    Notification Interaction: vocera    Purpose of Notification:Please order woc consult for coccyx wound if approp. Purple. Repositioning pt, pulsating mattress, mepilex in place. Also, Flushed and irrigated for sediment. Chirinos appears to be patent and draining except a small portion of chirinos near penis containing clear urine. Bladder scan 0, no discomfort or signs of distress.     Orders Received:    Comments:

## 2023-09-21 NOTE — PLAN OF CARE
Top portion must ALWAYS be completed  PRIMARY Concern: Aspiration pneumonia, UTI  SAFETY RISK Concerns (fall risk, behaviors, etc.): Fall risk, dysphagia  Tests/Procedures for NEXT shift: video swallow study  Consults? (Pending/following, signed-off?) speech following, woc 9-22  Where is patient from? (Home, TCU, etc.[facility]  Other Important info for NEXT shift: na  Anticipated DC date & active delays: tbd  _____________________________________________________________________________  SUMMARY NOTE:              (either paste note here OR complete the info below- RN to choose one- delete info below if not used)  Orientation/Cognitive: A&O. Dis to place when woken this afternoon.   Observation Goals (Met/ Not Met): inp  Mobility Level/Assist Equipment: Repo, Lift. Hx cva. Unable to move LUE. Minimal movement LLE  Antibiotics & Plan (IV/po, length of tx left): IV abx  Pain Management: tylenol x1 HA, resolved  Tele/VS/O2: VSS on 2L O2. 2 L baseline. Freq cough. Congested, unable to produce sputum, encouragement provided.  ABNL Lab/BG: vre. urine culture pending  Diet: minced moist, mildly thick, mod carb  Bowel/Bladder: incont chirinos, irrigated, flushed for sediment. Patent.   Skin Concerns: buttock, purple. Woc consult ordered. Pulsating mattress.   Drains/Devices: chirinos. dentures  Patient Stated Goal for Today: make jokes

## 2023-09-21 NOTE — PROGRESS NOTES
PRIMARY Concern: Aspiration pneumonia  SAFETY RISK Concerns : Fall, aspiration  Tests/Procedures for NEXT shift: None  Consults? Case management/social works, Speech  Where is patient from?  Nursing home ([facility])      Summary:  Admitted for aspiration pneumonia  Care Plan Summary Note:  Orientation: A&O x4  Observation Goals (met & not met): not met  Activity Level: A2/lift  Fall Risk: yes. Bed alarm on  Behavior & Aggression Tool Color: green  Pain Management: denies  ABNL VS/O2: VSS on 2L (BL)  ABNL Lab/BG: NA  Diet: minced and moist, mild;ly thickened liquids  Bowel/Bladder: chronic chirinos, incontinent of bowel  Drains/Devices: Chirinos  Tests/Procedures for next shift: NA  Anticipated DC date: pending  Other Important Info: hemiplegic on L side from previous CVA. Coccyx/sacrum wound, covered with mepilex. WOC consult needed.

## 2023-09-21 NOTE — CONSULTS
Care Management Initial Consult    General Information  Assessment completed with: Care Team MemberShireen  Type of CM/SW Visit: Initial Assessment    Primary Care Provider verified and updated as needed: Yes   Readmission within the last 30 days: no previous admission in last 30 days      Reason for Consult: discharge planning  Advance Care Planning: Advance Care Planning Reviewed: present on chart          Communication Assessment  Patient's communication style: spoken language (English or Bilingual)             Cognitive  Cognitive/Neuro/Behavioral: WDL  Level of Consciousness: alert     Orientation: oriented x 4  Mood/Behavior: calm, cooperative  Best Language: 0 - No aphasia  Speech: spontaneous, logical, hoarse    Living Environment:   People in home: facility resident     Current living Arrangements: assisted living  Name of Facility: [facility]   Able to return to prior arrangements: yes  Living Arrangement Comments: Pt is in the extended assisted living at facility    Family/Social Support:  Care provided by: self, homecare agency, other (see comments) (staff)  Provides care for: no one, unable/limited ability to care for self  Marital Status:   Wife, Children          Description of Support System: Supportive    Support Assessment: Adequate family and caregiver support    Current Resources:   Patient receiving home care services: Yes  Skilled Home Care Services: Skilled Nursing, Speech Therapy  Community Resources: None  Equipment currently used at home:    Supplies currently used at home: Incontinence Supplies, Other    Employment/Financial:  Employment Status: retired        Financial Concerns:             Does the patient's insurance plan have a 3 day qualifying hospital stay waiver?  Yes   Will the waiver be used for post-acute placement? No    Lifestyle & Psychosocial Needs:  Social Determinants of Health     Food Insecurity: Not on file   Depression: Not on file   Housing Stability: Not  on file   Tobacco Use: Medium Risk (9/26/2022)    Patient History     Smoking Tobacco Use: Former     Smokeless Tobacco Use: Never     Passive Exposure: Not on file   Financial Resource Strain: Not on file   Alcohol Use: Not on file   Transportation Needs: Not on file   Physical Activity: Not on file   Interpersonal Safety: Not on file   Stress: Not on file   Social Connections: Not on file       Functional Status:  Prior to admission patient needed assistance:   Dependent ADLs:: Bathing, Dressing, Eating, Grooming, Incontinence, Positioning, Transfers, Wheelchair-with assist, Toileting  Dependent IADLs:: Meal Preparation, Cooking, Cleaning, Laundry, Medication Management, Transportation       Mental Health Status:          Chemical Dependency Status:                Values/Beliefs:  Spiritual, Cultural Beliefs, Latter-day Practices, Values that affect care:                 Additional Information:  Spoke with Shireen from [facility] for initial consult/discharge planning. Pt was admitted on 9/20/23 for generalized weakness and worsening cough, work-up showed right lung base infiltrate concerning for pneumonia, per H&P. Information obtained from SOFIA Iyer at [facility]. Per Shireen, pt lives in the extended assisted living, receives total cares for ADL's, utilizes a aleks lift, they help with meals, cleaning, meds, laundry, safety checks every hour. Pt has electric wheelchair but doesn't use it, primarily sits in recliner majority of the time. Pt has [facility] home care for RN (marion chirinos) and speech services. Provided update to Shireen on pt's current status. Identified potential discharge of tomorrow, due to pt needing SLP video swallow study tomorrow, but provided with recommendation for outpatient speech therapy.     Any new medications will need to be filled here and sent with pt back to [facility]. Their fax number is [phone].     Received call from Fady Fierro care coordinator  [phone]. Updated on pt's status and potential discharge plans. She mentioned that pt may need more supervision around eating when at [facility].     Iwona Lopez, JANESSAW  Social Work  [facility]

## 2023-09-21 NOTE — PROGRESS NOTES
"Speech Language Pathology- Clinical swallow evaluation     09/21/23 0922   Appointment Info   Signing Clinician's Name / Credentials (SLP) Daniela Marcos MS CCC SLP   General Information   Onset of Illness/Injury or Date of Surgery 09/20/23   Referring Physician July Ray MD   Patient/Family Therapy Goal Statement (SLP) Patient would like to be able to eat/drink without getting sick.   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  Work-up showed right lung base infiltrate concerning for pneumonia.\"   General Observations Patient is seen sitting up in bed. He reports he recently discontinued his minced/moist diet at his facility due to dislike of texture.  He states he had been eating a regular diet and mildly thick liquids via pop/water bottle rim for several weeks. He uses a bottle to reduce anterior loss to L side. He wonders if mildly thick liquids are thick enough as he has been coughing when drinking sometimes.  He is agreeable to a modified diet here in the hospital.   Type of Evaluation   Type of Evaluation Swallow Evaluation   Oral Motor   Oral Musculature generally intact   Dentition (Oral Motor)   Dentition (Oral Motor) dental appliance/dentures;edentulous  (dentures not present)   Facial Symmetry (Oral Motor)   Facial Symmetry (Oral Motor) left side impairment  (minimal)   Left Side Facial Asymmetry minimal impairment   Lip Function (Oral Motor)   Lip Range of Motion (Oral Motor) protrusion impairment;retraction impairment   Lip Strength (Oral Motor) left side  (minimal)   Protrusion, Lip Range of Motion left side;minimal impairment   Retraction, Lip Range " "of Motion left side;minimal impairment   Tongue Function (Oral Motor)   Tongue ROM (Oral Motor) depression is impaired;elevation is impaired;protrusion is impaired;retraction is impaired;lateralization is impaired   Comment, Tongue Function (Oral Motor) minimal L sided deficits   Vocal Quality/Secretion Management (Oral Motor)   Vocal Quality (Oral Motor) wet/gurgly  (intermittently gurgly)   General Swallowing Observations   Past History of Dysphagia Prior video swallow study 4/12/23 \"Clinical and video swallow study completed: recommend minced and moist diet with mildly thick liquids by spoon, 1:1 feeding assistance while in hospital. Patient reporting sudden onset of worsened voice, throat pain, and swallowing difficulty associated with nebulizer treatment when liquid medication dripped into his mouth and was possibly aspirated prior to his previous admission. There was no aspiration during swallow study today, however, with significant swallow delay, drinking liquids in large quantity with head back position (drinking from water/pop bottle at home) may pose aspiration risk with spillover from pyriform sinus.\"   Respiratory Support (General Swallowing Observations) supplemental oxygen;nasal cannula  (2L)   Current Diet/Method of Nutritional Intake (General Swallowing Observations, NIS) mildly thick liquids (level 2);minced & moist (level 5)   Swallowing Evaluation Clinical swallow evaluation   Clinical Swallow Evaluation   Feeding Assistance set up only required   Clinical Swallow Evaluation Textures Trialed mildly thick liquids;pureed;minced & moist   Clinical Swallow Eval: Mildly Thick Liquids   Mode of Presentation cup;self-fed   Volume Presented 2 oz   Oral Phase premature pharyngeal entry  (suspected)   Pharyngeal Phase repeated swallows   Clinical Swallow Evaluation: Puree Solid Texture Trial   Mode of Presentation, Puree spoon;self-fed   Volume of Puree Presented 2 oz   Oral Phase, Puree effortful AP " movement   Pharyngeal Phase, Puree   (no overt s/s aspiration)   Clinical Swallow Eval: Minced & Moist   Mode of Presentation fed by clinician;spoon   Volume Presented 2 oz   Oral Phase effortful AP movement   Pharyngeal Phase   (no overt s/s aspiration)   Esophageal Phase of Swallow   Patient reports or presents with symptoms of esophageal dysphagia No   Swallowing Recommendations   Diet Consistency Recommendations mildly thick liquids (level 2);minced & moist (level 5)   Supervision Level for Intake distant supervision needed   Mode of Delivery Recommendations bolus size, small;slow rate of intake   Swallowing Maneuver Recommendations alternate food and liquid intake   Recommended Feeding/Eating Techniques (Swallow Eval) maintain upright sitting position for eating   Medication Administration Recommendations, Swallowing (SLP) whole with thick liquids or puree carrier.   Instrumental Assessment Recommendations VFSS (videofluoroscopic swallowing study)   Comment, Swallowing Recommendations Patient wishes to pursue repeat VFSS as he is concerned mildly thick liquids may not be thick enough.   General Therapy Interventions   Planned Therapy Interventions Dysphagia Treatment   Dysphagia treatment Instruction of safe swallow strategies;Compensatory strategies for swallowing   Clinical Impression   Criteria for Skilled Therapeutic Interventions Met (SLP Eval) Yes, treatment indicated   SLP Diagnosis Dysphagia   Risks & Benefits of therapy have been explained evaluation/treatment results reviewed;patient;participants included;participants voiced agreement with care plan   Clinical Impression Comments Patient seen for clinical swallow evaluation. Patient presents with moderate oropharyngeal dysphagia characterized by L sided oral motor deficits (minimal) and suspected premature pharyngeal entry. Patient reports having recently discontinued minced/moist solids at the facility due to texture dislike and had been eating a  regular diet. He is concerned mildly thick liquids are not adequately thick and would like to pursue repeat video swallow study (prior video 4/2023).  Continue minced/moist solids/mildly thick liquids for now. Plan videoflouroscopic swallow study tomorrow.   SLP Total Evaluation Time   Eval: oral/pharyngeal swallow function, clinical swallow Minutes (68442) 20   SLP Goals   Therapy Frequency (SLP Eval) daily   SLP Predicted Duration/Target Date for Goal Attainment 09/28/23   SLP Discharge Planning   SLP Plan SLP: video   SLP Discharge Recommendation home with outpatient speech therapy  (Back to SNF with ongoing SLP.)   SLP Rationale for DC Rec Patient may require ongoing dysphagia management.   SLP Brief overview of current status  Continue minced/moist solids/mildly thick liquids for now. Plan videoflouroscopic swallow study tomorrow.   Total Session Time   Total Session Time (sum of timed and untimed services) 20

## 2023-09-21 NOTE — UTILIZATION REVIEW
Admission Status; Secondary Review Determination       Under the authority of the Utilization Management Committee, the utilization review process indicated a secondary review on the above patient. The review outcome is based on review of the medical records, discussions with staff, and applying clinical experience noted on the date of the review.     (x) Inpatient Status Appropriate - This patient's medical care is consistent with medical management for inpatient care and reasonable inpatient medical practice.     RATIONALE FOR DETERMINATION     83-year-old male with a history of COPD, chronic oxygen use, hypertension, oropharyngeal dysphagia, and aspiration pneumonia presented with generalized weakness and a worsening cough. Work-up revealed a right lung base infiltrate suggestive of pneumonia, likely due to aspiration. He has stable COPD with no exacerbation, and oxygen requirements remain at 2 L/min, with normal VBG results. His chronic diastolic CHF with preserved EF appears euvolemic, and he continues on Lasix. Other chronic conditions include essential hypertension, dyslipidemia, and a previous CVA, for which he takes metoprolol, atorvastatin, and aspirin. The patient received appropriate antibiotics and is undergoing a speech therapy evaluation for oropharyngeal dysphagia.  The expected length of stay at the time of admission was more than 2 nights because of the severity of illness, intensity of service provided, and risk for adverse outcome. Inpatient admission is appropriate.         This document was produced using voice recognition software       The information on this document is developed by the utilization review team in order for the business office to ensure compliance. This only denotes the appropriateness of proper admission status and does not reflect the quality of care rendered.   The definitions of Inpatient Status and Observation Status used in making the determination above are those  provided in the CMS Coverage Manual, Chapter 1 and Chapter 6, section 70.4.   Sincerely,   MAYELA MO MD   System Medical Director   Utilization Management   [facility].

## 2023-09-21 NOTE — PROGRESS NOTES
Observation goals  PRIOR TO DISCHARGE       -diagnostic tests and consults completed and resulted: met  -vital signs normal or at patient baseline: met   -tolerating oral intake to maintain hydration: met  -infection is improving: in progress  -dyspnea improved and O2 sats greater than 88% on room air or prior home oxygen levels: met  -returns to baseline functional status: met

## 2023-09-21 NOTE — PROGRESS NOTES
[facility]    Hospitalist Progress Note    Brief Summary:  Ron Gilmore is an 83 year old male with COPD, chronically on 2 L of oxygen, hypertension, CVA, chronic diastolic CHF with preserved EF, oropharyngeal dysphagia, aspiration pneumonia, chronic urinary retention with chronic Cardona in place, resident of assisted living who presents on 9/20/2023 with generalized weakness and worsening cough.  Work-up showed right lung base infiltrate concerning for pneumonia.  UA mildly abnormal.     Assessment & Plan     Possible aspiration pneumonia  Oropharyngeal dysphagia  -Has known moderate oropharyngeal dysphagia and is on minced and moist diet with mildly thick liquids  -Has chronic cough which has worsened in the last few days, and coughing up some phlegm.  -Afebrile, no leukocytosis, normal lactate.  COVID-19/influenza/RSV negative  -Chest x-ray showed right basilar infiltrate concerning for pneumonia  He is started on IV Unasyn and doxycycline we will continue with that.  Speech is consulted, recommend video swallow study, which is scheduled for tomorrow.  Which I agree  At this time overall improved, continue with IV antibiotics.  And switch to oral once ready for discharge        COPD  Chronic hypoxic and hypercapnic respiratory failure, on 2 L of oxygen at baseline  -Does not seem to have COPD exacerbation.  Oxygen requirements are stable.  Saturations 92-97% on usual 2 L of oxygen  -VBG's showed normal pH of 7.37, mildly elevated PCO2 of 61  -Continue usual  nebs-formoterol, DuoNebs  -Continue supplemental oxygen at 2 L/min  Incentive spirometry and flutter.        Essential hypertension  -Continue metoprolol 12.5 mg twice daily     Chronic diastolic CHF with preserved EF of 55-60%, echo 4/2023  -He is on Lasix 30 mg daily we will continue with that.  -Not in acute exacerbation at this time     Dyslipidemia  -Resume atorvastatin after discharge     Previous CVA  -Continue aspirin 81 mg  daily     Depression  -Stable.  Continue paroxetine 50 mg daily     Diabetes mellitus type 2, diet controlled, with neuropathy  -Not on any medications.  No need to check blood sugars     GERD  -Continue Protonix     Chronic gout  -No acute exacerbation.  Continue allopurinol           Diet:  Minced and moist diet with mildly thick liquids      DVT Prophylaxis: Enoxaparin (Lovenox) SQ  Code Status: No CPR- Do NOT Intubate    Disposition: Expected discharge tomorrow if remains stable.    Jose George MD, MD  Text Page  (7am - 6pm)    Interval History   Patient seen and evaluated in his room today, feeling better, still having some cough.  Denies any fever chills chest pain shortness of breath nausea vomiting headache dizziness or lightheadedness.    No other significant event overnight    -Data reviewed today: I reviewed all new labs and imaging results over the last 24 hours. I personally reviewed no images or EKG's today.    Physical Exam   Temp: 97.9  F (36.6  C) Temp src: Oral BP: 127/75 Pulse: 97   Resp: 18 SpO2: 95 % O2 Device: Nasal cannula Oxygen Delivery: 2 LPM  Vitals:    09/20/23 1218 09/20/23 2206   Weight: 99.8 kg (220 lb) 97.7 kg (215 lb 6.2 oz)     Vital Signs with Ranges  Temp:  [97.9  F (36.6  C)-99.1  F (37.3  C)] 97.9  F (36.6  C)  Pulse:  [] 97  Resp:  [16-18] 18  BP: ()/(50-82) 127/75  SpO2:  [92 %-97 %] 95 %  I/O last 3 completed shifts:  In: -   Out: 1250 [Urine:1250]    Constitutional: awake, alert, cooperative, no apparent distress, and appears stated age  Eyes: Lids and lashes normal, pupils equal, round and reactive to light, extra ocular muscles intact, sclera clear, conjunctiva normal  Respiratory: No increased work of breathing, diminished air entry bilaterally, bilateral rhonchi positive with occasional crackles  Cardiovascular: Normal apical impulse, regular rate and rhythm, normal S1 and S2, no S3 or S4, and no murmur noted  GI: No scars, normal bowel sounds, soft,  non-distended, non-tender, no masses palpated, no hepatosplenomegally  Musculoskeletal: no lower extremity pitting edema present  Neurologic: Functional quadriplegia, not able to move left side, very limited movement on the right side.  Bedbound    Medications      allopurinol  300 mg Oral QAM    ampicillin-sulbactam  3 g Intravenous Q6H    aspirin  81 mg Oral Daily    doxycycline hyclate  100 mg Oral Q12H [facility] (08/20)    formoterol  20 mcg Nebulization Q12H    furosemide  30 mg Oral Daily    ipratropium - albuterol 0.5 mg/2.5 mg/3 mL  1 vial Nebulization TID    metoprolol tartrate  12.5 mg Oral BID    pantoprazole  40 mg Oral BID    PARoxetine  50 mg Oral Daily    polyethylene glycol-propylene glycol PF  2 drop Both Eyes BID    senna-docusate  1 tablet Oral Daily       Data   Recent Labs   Lab 09/21/23  0633 09/20/23  1241   WBC 10.0 10.4   HGB 11.5* 12.1*   MCV 98 100    198    135*   POTASSIUM 4.0 3.5   CHLORIDE 99 93*   CO2 28 30*   BUN 16.0 16.9   CR 0.81 0.87   ANIONGAP 11 12   RAJ 9.1 9.2   * 167*   ALBUMIN  --  3.3*   PROTTOTAL  --  7.5   BILITOTAL  --  0.8   ALKPHOS  --  73   ALT  --  12   AST  --  14       Recent Results (from the past 24 hour(s))   Chest XR,  PA & LAT    Narrative    CHEST TWO VIEWS September 20, 2023 1:01 PM     HISTORY: Worsening cough.    COMPARISON: 5/4/2023.      Impression    IMPRESSION: Shallow inspiration. Opacity at the right lung base  medially is suspicious for pneumonia. The left lung is clear. Loop  recorder device is projected over the left chest laterally. No pleural  effusions are seen, although lateral view is degraded related to  patient body habitus and arm positioning.    BRUNO PAYAN MD         SYSTEM ID:  [id number]   CT Head w/o Contrast    Narrative    EXAM: CT HEAD W/O CONTRAST  9/20/2023 1:19 PM     HISTORY: altered mental status       COMPARISON: 1/30/2023    TECHNIQUE: Using multidetector thin collimation helical acquisition  technique,  axial, coronal and sagittal CT images from the skull base  to the vertex were obtained without intravenous contrast.   (topogram) image(s) also obtained and reviewed. Dose reduction  techniques were used.    FINDINGS:  No acute intracranial hemorrhage, mass effect, or midline shift.  Stable subcortical, periventricular areas of white matter  hypoattenuation. Mild parenchymal volume loss, compensatory  ventricular dilatation. Stable bilateral basal ganglia lacunar  infarcts. No CT findings of acute infarct or hydrocephalus. Preserved  subarachnoid spaces.    Atraumatic calvarium. No substantial paranasal sinus mucosal disease.  Clear mastoid air cells. Nonfocal orbits.       Impression    IMPRESSION:   1. No acute intracranial pathology.  2. Stable brain atrophy and presumed sequela of chronic microvascular  ischemic disease, as detailed.    LUÍS STRICKLAND DO         SYSTEM ID:  [id number]

## 2023-09-21 NOTE — PROGRESS NOTES
Observation goals  PRIOR TO DISCHARGE       -diagnostic tests and consults completed and resulted: not met  -vital signs normal or at patient baseline: met   -tolerating oral intake to maintain hydration: met  -infection is improving: in progress  -dyspnea improved and O2 sats greater than 88% on room air or prior home oxygen levels: met. On 2L oxygen at baseline  -returns to baseline functional status: met

## 2023-09-21 NOTE — PROGRESS NOTES
Admission/Transfer from: ED  2 RN skin assessment completed. Yes  Significant findings include: wound on coccyx  WOC Nurse Consult Ordered?  Requested from doctor, but order not placed yet

## 2023-09-21 NOTE — PROGRESS NOTES
Observation goals  PRIOR TO DISCHARGE       -diagnostic tests and consults completed and resulted: met  -vital signs normal or at patient baseline: met   -tolerating oral intake to maintain hydration: met  -infection is improving: in progress  -dyspnea improved and O2 sats greater than 88% on room air or prior home oxygen levels: met. On 2L oxygen at baseline  -returns to baseline functional status: me

## 2023-09-22 ENCOUNTER — APPOINTMENT (OUTPATIENT)
Dept: SPEECH THERAPY | Facility: CLINIC | Age: 83
DRG: 178 | End: 2023-09-22
Payer: MEDICARE

## 2023-09-22 ENCOUNTER — APPOINTMENT (OUTPATIENT)
Dept: GENERAL RADIOLOGY | Facility: CLINIC | Age: 83
DRG: 178 | End: 2023-09-22
Attending: INTERNAL MEDICINE
Payer: MEDICARE

## 2023-09-22 LAB
BACTERIA UR CULT: ABNORMAL

## 2023-09-22 PROCEDURE — 74230 X-RAY XM SWLNG FUNCJ C+: CPT

## 2023-09-22 PROCEDURE — 94640 AIRWAY INHALATION TREATMENT: CPT | Mod: 76

## 2023-09-22 PROCEDURE — 999N000128 HC STATISTIC PERIPHERAL IV START W/O US GUIDANCE

## 2023-09-22 PROCEDURE — 999N000040 HC STATISTIC CONSULT NO CHARGE VASC ACCESS

## 2023-09-22 PROCEDURE — 94640 AIRWAY INHALATION TREATMENT: CPT

## 2023-09-22 PROCEDURE — G0463 HOSPITAL OUTPT CLINIC VISIT: HCPCS

## 2023-09-22 PROCEDURE — 99207 PR NO CHARGE LOS: CPT | Performed by: INTERNAL MEDICINE

## 2023-09-22 PROCEDURE — 99232 SBSQ HOSP IP/OBS MODERATE 35: CPT | Performed by: INTERNAL MEDICINE

## 2023-09-22 PROCEDURE — 92611 MOTION FLUOROSCOPY/SWALLOW: CPT | Mod: GN | Performed by: SPEECH-LANGUAGE PATHOLOGIST

## 2023-09-22 PROCEDURE — 250N000011 HC RX IP 250 OP 636: Performed by: INTERNAL MEDICINE

## 2023-09-22 PROCEDURE — 250N000013 HC RX MED GY IP 250 OP 250 PS 637: Performed by: INTERNAL MEDICINE

## 2023-09-22 PROCEDURE — 92526 ORAL FUNCTION THERAPY: CPT | Mod: GN | Performed by: SPEECH-LANGUAGE PATHOLOGIST

## 2023-09-22 PROCEDURE — 250N000009 HC RX 250: Performed by: INTERNAL MEDICINE

## 2023-09-22 PROCEDURE — 120N000001 HC R&B MED SURG/OB

## 2023-09-22 PROCEDURE — 999N000157 HC STATISTIC RCP TIME EA 10 MIN

## 2023-09-22 RX ORDER — BARIUM SULFATE 400 MG/ML
SUSPENSION ORAL ONCE
Status: COMPLETED | OUTPATIENT
Start: 2023-09-22 | End: 2023-09-22

## 2023-09-22 RX ADMIN — FORMOTEROL FUMARATE DIHYDRATE 20 MCG: 20 SOLUTION RESPIRATORY (INHALATION) at 07:38

## 2023-09-22 RX ADMIN — PANTOPRAZOLE SODIUM 40 MG: 40 TABLET, DELAYED RELEASE ORAL at 20:57

## 2023-09-22 RX ADMIN — METOPROLOL TARTRATE 12.5 MG: 25 TABLET, FILM COATED ORAL at 09:05

## 2023-09-22 RX ADMIN — DOXYCYCLINE HYCLATE 100 MG: 100 CAPSULE ORAL at 20:58

## 2023-09-22 RX ADMIN — DOXYCYCLINE HYCLATE 100 MG: 100 CAPSULE ORAL at 09:05

## 2023-09-22 RX ADMIN — AMPICILLIN SODIUM AND SULBACTAM SODIUM 3 G: 2; 1 INJECTION, POWDER, FOR SOLUTION INTRAMUSCULAR; INTRAVENOUS at 12:17

## 2023-09-22 RX ADMIN — AMPICILLIN SODIUM AND SULBACTAM SODIUM 3 G: 2; 1 INJECTION, POWDER, FOR SOLUTION INTRAMUSCULAR; INTRAVENOUS at 22:52

## 2023-09-22 RX ADMIN — METOPROLOL TARTRATE 12.5 MG: 25 TABLET, FILM COATED ORAL at 20:58

## 2023-09-22 RX ADMIN — AMPICILLIN SODIUM AND SULBACTAM SODIUM 3 G: 2; 1 INJECTION, POWDER, FOR SOLUTION INTRAMUSCULAR; INTRAVENOUS at 04:29

## 2023-09-22 RX ADMIN — BARIUM SULFATE 40 ML: 400 SUSPENSION ORAL at 10:33

## 2023-09-22 RX ADMIN — IPRATROPIUM BROMIDE AND ALBUTEROL SULFATE 3 ML: .5; 3 SOLUTION RESPIRATORY (INHALATION) at 20:47

## 2023-09-22 RX ADMIN — PAROXETINE HYDROCHLORIDE 50 MG: 10 TABLET, FILM COATED ORAL at 09:06

## 2023-09-22 RX ADMIN — ALLOPURINOL 300 MG: 300 TABLET ORAL at 09:05

## 2023-09-22 RX ADMIN — SENNOSIDES AND DOCUSATE SODIUM 1 TABLET: 50; 8.6 TABLET ORAL at 09:06

## 2023-09-22 RX ADMIN — FORMOTEROL FUMARATE DIHYDRATE 20 MCG: 20 SOLUTION RESPIRATORY (INHALATION) at 20:47

## 2023-09-22 RX ADMIN — FUROSEMIDE 30 MG: 20 TABLET ORAL at 09:05

## 2023-09-22 RX ADMIN — AMPICILLIN SODIUM AND SULBACTAM SODIUM 3 G: 2; 1 INJECTION, POWDER, FOR SOLUTION INTRAMUSCULAR; INTRAVENOUS at 17:07

## 2023-09-22 RX ADMIN — POLYETHYLENE GLYCOL 400 AND PROPYLENE GLYCOL 2 DROP: 4; 3 SOLUTION/ DROPS OPHTHALMIC at 09:06

## 2023-09-22 RX ADMIN — PANTOPRAZOLE SODIUM 40 MG: 40 TABLET, DELAYED RELEASE ORAL at 09:05

## 2023-09-22 RX ADMIN — ASPIRIN 81 MG: 81 TABLET, COATED ORAL at 09:05

## 2023-09-22 ASSESSMENT — ACTIVITIES OF DAILY LIVING (ADL)
ADLS_ACUITY_SCORE: 51
ADLS_ACUITY_SCORE: 47
ADLS_ACUITY_SCORE: 51
ADLS_ACUITY_SCORE: 47
ADLS_ACUITY_SCORE: 51
ADLS_ACUITY_SCORE: 47
ADLS_ACUITY_SCORE: 51
ADLS_ACUITY_SCORE: 51

## 2023-09-22 NOTE — PROGRESS NOTES
[facility]    Hospitalist Progress Note    Brief Summary:  Ron Gilmore is an 83 year old male with COPD, chronically on 2 L of oxygen, hypertension, CVA, chronic diastolic CHF with preserved EF, oropharyngeal dysphagia, aspiration pneumonia, chronic urinary retention with chronic Cardona in place, resident of assisted living who presents on 9/20/2023 with generalized weakness and worsening cough.  Work-up showed right lung base infiltrate concerning for pneumonia.  UA mildly abnormal.     Assessment & Plan     Possible aspiration pneumonia  Oropharyngeal dysphagia  -Has known moderate oropharyngeal dysphagia and is on minced and moist diet with mildly thick liquids  -Has chronic cough which has worsened in the last few days, and coughing up some phlegm.  -Afebrile, no leukocytosis, normal lactate.  COVID-19/influenza/RSV negative  -Chest x-ray showed right basilar infiltrate concerning for pneumonia  He is started on IV Unasyn and doxycycline we will continue with that.  Speech is consulted, recommend video swallow study, which is scheduled for tomorrow.  Which I agree  At this time overall improved, continue with IV antibiotics.  And switch to oral once ready for discharge  .  Swallow evaluation done today, speech is following, final recommendations pending at this time.        COPD  Chronic hypoxic and hypercapnic respiratory failure, on 2 L of oxygen at baseline  -Does not seem to have COPD exacerbation.  Oxygen requirements are stable.  Saturations 92-97% on usual 2 L of oxygen  -VBG's showed normal pH of 7.37, mildly elevated PCO2 of 61  -Continue usual  nebs-formoterol, DuoNebs  -Continue supplemental oxygen at 2 L/min  Incentive spirometry and flutter.        Essential hypertension  -Continue metoprolol 12.5 mg twice daily     Chronic diastolic CHF with preserved EF of 55-60%, echo 4/2023  -He is on Lasix 30 mg daily we will continue with that.  -Not in acute exacerbation at this  time     Dyslipidemia  -Resume atorvastatin after discharge     Previous CVA  -Continue aspirin 81 mg daily     Depression  -Stable.  Continue paroxetine 50 mg daily     Diabetes mellitus type 2, diet controlled, with neuropathy  -Not on any medications.  No need to check blood sugars     GERD  -Continue Protonix     Chronic gout  -No acute exacerbation.  Continue allopurinol           Diet:  Minced and moist diet with mildly thick liquids      DVT Prophylaxis: Enoxaparin (Lovenox) SQ  Code Status: No CPR- Do NOT Intubate    Disposition: Expected discharge tomorrow if remains stable.    Jose George MD, MD  Text Page  (7am - 6pm)    Interval History   Patient seen and evaluated in his room today, has been having splints breath, feeling well has some mild cough.  Not coughing up any phlegm.  Denies any fever chills chest pain abdominal pain back pain dysuria hematuria constipation diarrhea at this time.    No other significant event overnight    -Data reviewed today: I reviewed all new labs and imaging results over the last 24 hours. I personally reviewed no images or EKG's today.    Physical Exam   Temp: 98.1  F (36.7  C) Temp src: Oral BP: 92/54 Pulse: 83   Resp: 18 SpO2: 94 % O2 Device: Nasal cannula Oxygen Delivery: 2 LPM  Vitals:    09/20/23 1218 09/20/23 2206   Weight: 99.8 kg (220 lb) 97.7 kg (215 lb 6.2 oz)     Vital Signs with Ranges  Temp:  [97.2  F (36.2  C)-98.2  F (36.8  C)] 98.1  F (36.7  C)  Pulse:  [76-87] 83  Resp:  [18] 18  BP: ()/(54-87) 92/54  SpO2:  [93 %-95 %] 94 %  I/O last 3 completed shifts:  In: 240 [P.O.:240]  Out: 1325 [Urine:1325]    Constitutional: awake, alert, cooperative, no apparent distress, and appears stated age  Eyes: Lids and lashes normal, pupils equal, round and reactive to light, extra ocular muscles intact, sclera clear, conjunctiva normal  Respiratory: No increased work of breathing, diminished air entry bilaterally, mild bilateral rhonchi positive cardiovascular:  Normal apical impulse, regular rate and rhythm, normal S1 and S2, no S3 or S4, and no murmur noted  GI: No scars, normal bowel sounds, soft, non-distended, non-tender, no masses palpated, no hepatosplenomegally  Musculoskeletal: no lower extremity pitting edema present  Neurologic: Functional quadriplegia, not able to move left side, very limited movement on the right side.  Bedbound    Medications      allopurinol  300 mg Oral QAM    ampicillin-sulbactam  3 g Intravenous Q6H    aspirin  81 mg Oral Daily    doxycycline hyclate  100 mg Oral Q12H [facility] (08/20)    formoterol  20 mcg Nebulization Q12H    furosemide  30 mg Oral Daily    ipratropium - albuterol 0.5 mg/2.5 mg/3 mL  1 vial Nebulization TID    metoprolol tartrate  12.5 mg Oral BID    pantoprazole  40 mg Oral BID    PARoxetine  50 mg Oral Daily    polyethylene glycol-propylene glycol PF  2 drop Both Eyes BID    senna-docusate  1 tablet Oral Daily       Data   Recent Labs   Lab 09/21/23  0633 09/20/23  1241   WBC 10.0 10.4   HGB 11.5* 12.1*   MCV 98 100    198    135*   POTASSIUM 4.0 3.5   CHLORIDE 99 93*   CO2 28 30*   BUN 16.0 16.9   CR 0.81 0.87   ANIONGAP 11 12   RAJ 9.1 9.2   * 167*   ALBUMIN  --  3.3*   PROTTOTAL  --  7.5   BILITOTAL  --  0.8   ALKPHOS  --  73   ALT  --  12   AST  --  14       Recent Results (from the past 24 hour(s))   XR Video Swallow with SLP or OT    Narrative    VIDEO SWALLOWING EVALUATION   9/22/2023 10:39 AM     HISTORY: aspiration    COMPARISON: None.    FLUOROSCOPY TIME: 1.7 minutes.  SPOT IMAGES OR CINE RUNS: 9    FINDINGS:    Flash penetration with mildly thick consistency. Refer to speech  pathology report.    NADYA BONILLA MD         SYSTEM ID:  [id number]

## 2023-09-22 NOTE — PROGRESS NOTES
MD Notification    Notified Person: MD    Notified Person Name: Jose George    Notification Date/Time: 9/22 at 3:00 pm     Notification Interaction: Vocera  page    Purpose of Notification: Just wanted to update you that this afternoon I had to call a staff assist as patient was choking. He is medically stable. But Would you like 1:1 supervision while eating? Also, repeat UA? Urine cultures positive for many gram negative bacilli. Thank you.     Orders Received: Pending     Comments:

## 2023-09-22 NOTE — PROGRESS NOTES
Goal Outcome Evaluation:      Plan of Care Reviewed With: Patient     Overall Patient Progress: improvingOverall Patient Progress: Improving     PRIMARY Concern: Aspiration pneumonia, UTI  SAFETY RISK Concerns (fall risk, behaviors, etc.): Fall risk, dysphagia  Tests/Procedures for NEXT shift: None   Consults? (Pending/following, signed-off?) Speech following  Where is patient from? (Home, TCU, etc.): [facility] [place]  Other Important info for NEXT shift: Staff assist called today d/t patient choking   Anticipated DC date & active delays: Possibly 9/23  _____________________________________________________________________________  SUMMARY NOTE: 09/21-22/2023 [id number]            Orientation/Cognitive: AOx4; Forgetful at times.   Observation Goals (Met/ Not Met): Inpatient   Mobility Level/Assist Equipment: A2/Lift/Repo q2h. Hx CVA. Unable to move RUE. Minimal movement RLE  Antibiotics & Plan (IV/po, length of tx left): IV ABX  Pain Management: Denies pain. On scheduled Tylenol  Tele/VS/O2: VSS on 2L O2. 2 L baseline. Freq cough, nonproductive  ABNL Lab/BG: Urine culture +   Diet: Minced moist, mildly thick, mod carb  Bowel/Bladder: Incontinent of bowel, Cardona in place, patent  Skin Concerns: Pressure sore to sacrum. WOC following. Dressing changes BID and PRN, Cleansed and mepilex applied to sacurm  Drains/Devices: Cardona  Patient Stated Goal for Today: Rest

## 2023-09-22 NOTE — PLAN OF CARE
Goal Outcome Evaluation:      Plan of Care Reviewed With: patient    Overall Patient Progress: improvingOverall Patient Progress: improving    PRIMARY Concern: Aspiration pneumonia, UTI  SAFETY RISK Concerns (fall risk, behaviors, etc.): Fall risk, dysphagia  Tests/Procedures for NEXT shift: video swallow study will be completed this morning   Consults? (Pending/following, signed-off?) speech following, woc 9-22  Where is patient from? (Home, TCU, etc.): [facility]  Other Important info for NEXT shift: na  Anticipated DC date & active delays: Possibly 9/22 after swallowing study is completed.   _____________________________________________________________________________  SUMMARY NOTE: 09/21-22/2023 [id number]            Orientation/Cognitive: A&O x4; can be forgetful at times.   Observation Goals (Met/ Not Met): Inpatient   Mobility Level/Assist Equipment: Repo q2h, Lift. Hx cva. Unable to move RUE. Minimal movement RLE  Antibiotics & Plan (IV/po, length of tx left): IV abx  Pain Management: Denies pain. Tylenol available.   Tele/VS/O2: VSS on 2L O2. 2 L baseline. Freq cough. Congested, unable to produce sputum, encouragement provided.  ABNL Lab/BG: vre. urine culture pending  Diet: minced moist, mildly thick, mod carb  Bowel/Bladder: Incontinent of bowel, Chirinos in place. Sediment present; chirinos draining well.    Skin Concerns: buttock, purple. Woc consult ordered. Pulsating mattress. Mapilex applied to affected area   Drains/Devices: Chirinos. dentures  Patient Stated Goal for Today: Go home

## 2023-09-22 NOTE — CONSULTS
"[facility] Nurse Inpatient Assessment     Consulted for: Coccyx    Summary: Chronic skin injury to bilateral buttocks with MASD and friction.     Patient History (according to provider note(s):      \"Ron Gilmore is an 83 year old male with COPD, chronically on 2 L of oxygen, hypertension, CVA, chronic diastolic CHF with preserved EF, oropharyngeal dysphagia, aspiration pneumonia, chronic urinary retention with chronic Cardona in place, resident of assisted living who presents on 9/20/2023 with generalized weakness and worsening cough.  Work-up showed right lung base infiltrate concerning for pneumonia.  UA mildly abnormal.\"    Assessment:      Areas visualized during today's visit: Posterior surfaces  and Sacrum/coccyx    Skin Injury Location: bilateral buttocks and coccyx        Last photo: 9/22/23  Skin injury due to: Chronic skin injury (often related to prolonged recliner use), Friction, and Moisture associated skin damage (MASD)  Skin history and plan of care:  Patient reports wounds have been present about two months. Tells writer he spends half his day in bed and the other half in his recliner. Wears a brief to bed due to occasional fecal incontinence. Confirms that he has a cushion for his recliner. Expresses that he does not like to lay on his side. Wounded area is covered with sacral Mepilex on assessment.   Affected area:      Skin assessment: Dry/flaky, Erosion of epidermis, and Scaly; purple, blanchable, intact epidermis; moist, pink tissue slightly macerated in coccyx     Measurements (length x width x depth, in cm) Total area spanning buttocks 13  x 10  x  0.1 cm      Color: dusky and purple     Temperature  normal      Drainage: small .      Color: bloody      Odor: none  Pain: mild, intermittent, stinging, and itchy  Pain interventions prior to dressing change: patient tolerated well, slow and gentle cares , and distraction  Treatment goal: Heal  and " "Protection  STATUS: initial assessment  Supplies ordered: supplies stored on unit      Treatment Plan:     Buttock wound(s): BID and PRN with episodes of incontinence  Use Koki spray and white cloths for incontinence and perineal care. Avoid pre-moistened blue Bath wipes for vincent-care. Dry well.  Apply a thin layer of Critic-aid barrier paste to open areas on buttocks and coccyx. This does not need to be removed completely with each cleansing. Wipe away soiled layer and reapply PRN.  No need to use an additional protective dressing, but if patient prefers place Adaptic over wounded areas to prevent dressing from sticking to wound.  Follow PIP Plan.    Pressure Injury Prevention (PIP) Plan:  -If patient is declining pressure injury prevention interventions: Explore reason why and address patient's concerns, Educate on pressure injury risk and prevention intervention(s), If patient is still declining, document \"informed refusal\" , and Ensure Care team is aware ( provider, charge nurse, etc)  -Mattress: Follow bed algorithm, reassess daily and order specialty mattress, if indicated.  -HOB: Maintain at or below 30 degrees, unless contraindicated  -Repositioning in bed: Every 1-2 hours , Left/right positioning; avoid supine, and Raise foot of bed prior to raising head of bed, to reduce patient sliding down (shear)  -Heels: Keep elevated off mattress  -Protective Dressing: None  -Positioning Equipment:  pillows  -Chair positioning: Chair cushion (#[id number]) , Assist patient to reposition hourly, and Do NOT use a donut for sitting (this increases pressure to smaller area and creates a higher potential for injury)    -Moisture Management: Perineal cleansing /protection: Follow Incontinence Protocol, Avoid brief in bed, and Moisturize dry skin  -Under Devices: Inspect skin under all medical devices during skin inspection , Ensure tubes are stabilized without tension, and Ensure patient is not lying on medical devices or " "equipment when repositioned     Orders: Written    RECOMMEND PRIMARY TEAM ORDER: None, at this time  Education provided: importance of repositioning, plan of care, wound progress, Moisture management, and Off-loading pressure  Discussed plan of care with: Patient  WOC nurse follow-up plan: weekly  Notify WOC if wound(s) deteriorate.  Nursing to notify the Provider(s) and re-consult the WOC Nurse if new skin concern.    DATA:     Current support surface: Standard  Standard gel/foam mattress (IsoFlex, Atmos air, etc)  Containment of urine/stool: Incontinence Protocol, Brief, Incontinent pad in bed, and Indwelling catheter  BMI: Body mass index is 29.21 kg/m .   Active diet order: Orders Placed This Encounter      Combination Diet Regular Diet; Moderate Consistent Carb (60 g CHO per Meal) Diet; Minced and Moist Diet (level 5); Mildly Thick (level 2)     Output: I/O last 3 completed shifts:  In: 240 [P.O.:240]  Out: 1325 [Urine:1325]     Labs:   Recent Labs   Lab 09/21/23  0633 09/20/23  1241   ALBUMIN  --  3.3*   HGB 11.5* 12.1*   WBC 10.0 10.4     Pressure injury risk assessment:   Sensory Perception: 3-->slightly limited  Moisture: 3-->occasionally moist  Activity: 2-->chairfast  Mobility: 2-->very limited  Nutrition: 3-->adequate  Friction and Shear: 1-->problem  Lon Score: 14    Jud Tinsley RN, CWON  Please contact via [employer] at name or group \"[facility] nurse\"- M-F [phone]-4P  Leave  @ [phone] for non-urgent needs. Checked occasionally M-F.   "

## 2023-09-22 NOTE — PROGRESS NOTES
"Speech Language Pathology- Clinical swallow evaluation     09/21/23 0922   Appointment Info   Signing Clinician's Name / Credentials (SLP) Daniela Marcos MS CCC SLP   General Information   Onset of Illness/Injury or Date of Surgery 09/20/23   Referring Physician July Ray MD   Patient/Family Therapy Goal Statement (SLP) Patient would like to be able to eat/drink without getting sick.   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  Work-up showed right lung base infiltrate concerning for pneumonia.\"   General Observations Patient is seen sitting up in bed. He reports he recently discontinued his minced/moist diet at his facility due to dislike of texture.  He states he had been eating a regular diet and mildly thick liquids via pop/water bottle rim for several weeks. He uses a bottle to reduce anterior loss to L side. He wonders if mildly thick liquids are thick enough as he has been coughing when drinking sometimes.  He is agreeable to a modified diet here in the hospital.   Type of Evaluation   Type of Evaluation Swallow Evaluation   Oral Motor   Oral Musculature generally intact   Dentition (Oral Motor)   Dentition (Oral Motor) dental appliance/dentures;edentulous  (dentures not present)   Facial Symmetry (Oral Motor)   Facial Symmetry (Oral Motor) left side impairment  (minimal)   Left Side Facial Asymmetry minimal impairment   Lip Function (Oral Motor)   Lip Range of Motion (Oral Motor) protrusion impairment;retraction impairment   Lip Strength (Oral Motor) left side  (minimal)   Protrusion, Lip Range of Motion left side;minimal impairment   Retraction, Lip Range " "of Motion left side;minimal impairment   Tongue Function (Oral Motor)   Tongue ROM (Oral Motor) depression is impaired;elevation is impaired;protrusion is impaired;retraction is impaired;lateralization is impaired   Comment, Tongue Function (Oral Motor) minimal L sided deficits   Vocal Quality/Secretion Management (Oral Motor)   Vocal Quality (Oral Motor) wet/gurgly  (intermittently gurgly)   General Swallowing Observations   Past History of Dysphagia Prior video swallow study 4/12/23 \"Clinical and video swallow study completed: recommend minced and moist diet with mildly thick liquids by spoon, 1:1 feeding assistance while in hospital. Patient reporting sudden onset of worsened voice, throat pain, and swallowing difficulty associated with nebulizer treatment when liquid medication dripped into his mouth and was possibly aspirated prior to his previous admission. There was no aspiration during swallow study today, however, with significant swallow delay, drinking liquids in large quantity with head back position (drinking from water/pop bottle at home) may pose aspiration risk with spillover from pyriform sinus.\"   Respiratory Support (General Swallowing Observations) supplemental oxygen;nasal cannula  (2L)   Current Diet/Method of Nutritional Intake (General Swallowing Observations, NIS) mildly thick liquids (level 2);minced & moist (level 5)   Swallowing Evaluation Clinical swallow evaluation   Clinical Swallow Evaluation   Feeding Assistance set up only required   Clinical Swallow Evaluation Textures Trialed mildly thick liquids;pureed;minced & moist   Clinical Swallow Eval: Mildly Thick Liquids   Mode of Presentation cup;self-fed   Volume Presented 2 oz   Oral Phase premature pharyngeal entry  (suspected)   Pharyngeal Phase repeated swallows   Clinical Swallow Evaluation: Puree Solid Texture Trial   Mode of Presentation, Puree spoon;self-fed   Volume of Puree Presented 2 oz   Oral Phase, Puree effortful AP " movement   Pharyngeal Phase, Puree   (no overt s/s aspiration)   Clinical Swallow Eval: Minced & Moist   Mode of Presentation fed by clinician;spoon   Volume Presented 2 oz   Oral Phase effortful AP movement   Pharyngeal Phase   (no overt s/s aspiration)   Esophageal Phase of Swallow   Patient reports or presents with symptoms of esophageal dysphagia No   Swallowing Recommendations   Diet Consistency Recommendations mildly thick liquids (level 2);minced & moist (level 5)   Supervision Level for Intake distant supervision needed   Mode of Delivery Recommendations bolus size, small;slow rate of intake   Swallowing Maneuver Recommendations alternate food and liquid intake   Recommended Feeding/Eating Techniques (Swallow Eval) maintain upright sitting position for eating   Medication Administration Recommendations, Swallowing (SLP) whole with thick liquids or puree carrier.   Instrumental Assessment Recommendations VFSS (videofluoroscopic swallowing study)   Comment, Swallowing Recommendations Patient wishes to pursue repeat VFSS as he is concerned mildly thick liquids may not be thick enough.   General Therapy Interventions   Planned Therapy Interventions Dysphagia Treatment   Dysphagia treatment Instruction of safe swallow strategies;Compensatory strategies for swallowing   Clinical Impression   Criteria for Skilled Therapeutic Interventions Met (SLP Eval) Yes, treatment indicated   SLP Diagnosis Dysphagia   Risks & Benefits of therapy have been explained evaluation/treatment results reviewed;patient;participants included;participants voiced agreement with care plan   Clinical Impression Comments Patient seen for clinical swallow evaluation. Patient presents with moderate oropharyngeal dysphagia characterized by L sided oral motor deficits (minimal) and suspected premature pharyngeal entry. Patient reports having recently discontinued minced/moist solids at the facility due to texture dislike and had been eating a  regular diet. He is concerned mildly thick liquids are not adequately thick and would like to pursue repeat video swallow study (prior video 4/2023).  Continue minced/moist solids/mildly thick liquids for now. Plan videoflouroscopic swallow study tomorrow.   SLP Total Evaluation Time   Eval: oral/pharyngeal swallow function, clinical swallow Minutes (14888) 20   SLP Goals   Therapy Frequency (SLP Eval) daily   SLP Predicted Duration/Target Date for Goal Attainment 09/28/23   SLP Goals Swallow   SLP: Safely tolerate diet without signs/symptoms of aspiration Minced & moist diet;Mildly thick liquids;With use of compensatory swallow strategies;With use of swallow precautions   SLP Discharge Planning   SLP Plan SLP: video   SLP Discharge Recommendation home with outpatient speech therapy  (Back to SNF with ongoing SLP.)   SLP Rationale for DC Rec Patient may require ongoing dysphagia management.   SLP Brief overview of current status  Continue minced/moist solids/mildly thick liquids for now. Plan videoflouroscopic swallow study tomorrow.   Total Session Time   Total Session Time (sum of timed and untimed services) 20                                                                                Psychiatric      OUTPATIENT SPEECH LANGUAGE PATHOLOGY EVALUATION  PLAN OF TREATMENT FOR OUTPATIENT REHABILITATION  (COMPLETE FOR INITIAL CLAIMS ONLY)  Patient's Last Name, First Name, M.I.  YOB: 1940  Ron Gilmore                        Provider's Name  Psychiatric Medical Record No.  [id number]                               Onset Date:  09/20/23  Start of Care Date:      Type:     ___PT   ___OT   _X_SLP Medical Diagnosis:            SLP Diagnosis:  Dysphagia  Visits from SOC:  1   ________________________________________________________________  Plan of Treatment/Functional Goals    Planned Interventions:   Dysphagia Treatment       Instruction of  safe swallow strategies, Compensatory strategies for swallowing        Goals: See Speech Language Pathology Goals on Care Plan in [facility] electronic health record.    Therapy Frequency:daily   Predicted Duration of Therapy Intervention: 09/28/23  ________________________________________________________________________________    I CERTIFY THE NEED FOR THESE SERVICES FURNISHED UNDER        THIS PLAN OF TREATMENT AND WHILE UNDER MY CARE     (Physician attestation of this document indicates review and certification of the therapy plan).                     Referring Physician: July Ray MD           Initial Assessment        See Speech Language Pathology documentation in Epic electronic health record, evaluation dated

## 2023-09-22 NOTE — PROGRESS NOTES
Patient declined Duoneb treatments; he states that he has been doing them for years and does not perceive any benefit. No c/o shortness of breath. He is willing to continue with Perforomist.

## 2023-09-22 NOTE — PROGRESS NOTES
"VIDEO SWALLOW STUDY       09/22/23 1043   Appointment Info   Signing Clinician's Name / Credentials (SLP) Sigrid Griffin M.A., CCC-SLP, [facility]   General Information   Onset of Illness/Injury or Date of Surgery 09/20/23   Referring Physician Dr. Jose George   Patient/Family Therapy Goal Statement (SLP) Patient would like to be able to eat/drink without getting sick.   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  Work-up showed right lung base infiltrate concerning for pneumonia.\"  He has experienced recurrent aspiration pneumonias over the past couple years and would like to avoid any kind of tube feeding.   General Observations Patient reports he inhaled some scrambled egg right before video swallow study.   Dentition (Oral Motor)   Dentition (Oral Motor) dental appliance/dentures;edentulous  (dentures not present)   Facial Symmetry (Oral Motor)   Facial Symmetry (Oral Motor) left side impairment  (minimal)   General Swallowing Observations   Past History of Dysphagia Significant dysphagia with previous video swallow on 4/12/23 yielding recommendations for minced and moist diet with mildly thick liquids by spoon at that time.   Respiratory Support (General Swallowing Observations) supplemental oxygen;nasal cannula  (2L)   Current Diet/Method of Nutritional Intake (General Swallowing Observations, NIS) mildly thick liquids (level 2);minced & moist (level 5)   Swallowing Evaluation Videofluoroscopic swallow study (VFSS)   Clinical Swallow Evaluation   Feeding Assistance set up only required   VFSS Evaluation   Radiologist Dr. Crisostomo   Views Taken left lateral   VFSS " Textures Trialed mildly thick liquids;pureed;minced & moist   VFSS Eval: Mildly Thick Liquids   Mode of Presentation cup;spoon;self-fed;fed by clinician   Order of Presentation 1, 2, 3, 4, 8, 9   Preparatory Phase poor bolus control;anterior loss of bolus;impaired labial seal   Oral Phase impaired AP movement;premature pharyngeal entry   Bolus Location When Swallow Triggered pyriforms   Pharyngeal Phase impaired epiglottic movement;impaired hyolaryngel excursion;impaired tongue base retraction;residue in vallecula   Rosenbek's Penetration Aspiration Scale 2 - contrast enters airway, remains above the vocal cords, no residue remains (penetration)   Strategies and Compensations double swallow;reduce bolus size   Diagnostic Statement Flash penetration x1, premature pharyngeal entry posing penetration/aspiration risk with large boluses of liquids.   VFSS Evaluation: Puree Solid Texture Trial   Mode of Presentation, Puree spoon;self-fed   Order of Presentation 5   Preparatory Phase WFL   Oral Phase, Puree premature pharyngeal entry   Bolus Location When Swallow Triggered valleculae   Pharyngeal Phase, Puree impaired epiglottic movement   Rosenbek's Penetration Aspiration Scale: Puree Food Trial Results 1 - no aspiration, contrast does not enter airway   Diagnostic Statement Sluggish epiglottic inversion, premature pharyngeal entry   VFSS Eval: Minced & Moist    Mode of Presentation spoon;self-fed   Order of Presentation 6, 7   Preparatory Phase poor bolus control   Oral Phase impaired AP movement;premature pharyngeal entry   Bolus Location When Swallow Triggered valleculae   Pharyngeal Phase impaired epiglottic movement   Rosenbek's Penetration Aspiration Scale 1 - no aspiration, contrast does not enter airway   Diagnostic Statement Sluggish epiglottic inversion, premature pharyngeal entry   Esophageal Phase of Swallow   Patient reports or presents with symptoms of esophageal dysphagia No   Swallowing Recommendations    Diet Consistency Recommendations mildly thick liquids (level 2);minced & moist (level 5)   Supervision Level for Intake distant supervision needed   Mode of Delivery Recommendations bolus size, small;slow rate of intake   Swallowing Maneuver Recommendations effortful (hard) swallow;alternate food and liquid intake;extra swallow   Recommended Feeding/Eating Techniques (Swallow Eval) maintain upright sitting position for eating;maintain upright posture during/after eating for 30 minutes;set-up and prepare tray   Comment, Swallowing Recommendations Premature pharyngeal entry posing penetration/aspiration risk with large boluses of liquids.  No aspiration, not appropriate for thin liquid trials.   General Therapy Interventions   Planned Therapy Interventions Dysphagia Treatment   Dysphagia treatment Instruction of safe swallow strategies;Compensatory strategies for swallowing   Clinical Impression   Criteria for Skilled Therapeutic Interventions Met (SLP Eval) Yes, treatment indicated   SLP Diagnosis Oropharyngeal dysphagia   Risks & Benefits of therapy have been explained evaluation/treatment results reviewed;patient;participants included;participants voiced agreement with care plan   Clinical Impression Comments Premature pharyngeal entry posing penetration/aspiration risk with large boluses of liquids. No aspiration, not appropriate for thin liquid trials.   SLP Total Evaluation Time   Evaluation, videofluoroscopic eval of swallow function Minutes (28129) 25   SLP Goals   Therapy Frequency (SLP Eval) 3 times/wk   SLP Predicted Duration/Target Date for Goal Attainment 09/29/23   SLP Goals Swallow   SLP: Safely tolerate diet without signs/symptoms of aspiration Minced & moist diet;Mildly thick liquids;With use of compensatory swallow strategies;With use of swallow precautions   Interventions   Interventions Quick Adds Swallowing Dysfunction   Swallowing Dysfunction &/or Oral Function for Feeding   Treatment of  Swallowing Dysfunction &/or Oral Function for Feeding Minutes (46944) 15   Symptoms Noted During/After Treatment None   Treatment Detail/Skilled Intervention Patient trained with verbal, demonstration training for double swallow with biofeedback provided for efficacy demonstration of double swallow.  Educated re: solid food consistency requirements with avoidance of foods that break apart like eggs or rice without sauces.   SLP Discharge Planning   SLP Plan Meal follow up and double swallow strategy with liquids.   SLP Discharge Recommendation Transitional Care Facility  (Back to SNF with ongoing SLP.)   SLP Rationale for DC Rec Patient could benefit from follow up SLP services for compensatory strategy training and carryover for diet modification awareness and restrictions.   SLP Brief overview of current status  Video swallow study completed: there was flash penetration of mildly thick liquids due to premature entry with large sips, but no aspiration.  Recommend continue minced and moist diet with mildly thick liquids, patient feeding self small bites/sips, upright at 90 degrees, ensure enough moisture with foods like scrambled eggs (should be a cohesive bite with sauce added as needed), double swallow to clear oropharyngeal residue with each bite and sip.   Total Session Time   Total Session Time (sum of timed and untimed services) 40

## 2023-09-23 PROBLEM — Z97.8 CHRONIC INDWELLING FOLEY CATHETER: Status: ACTIVE | Noted: 2023-09-23

## 2023-09-23 PROBLEM — Z66 DNR (DO NOT RESUSCITATE): Status: ACTIVE | Noted: 2023-09-23

## 2023-09-23 PROCEDURE — 250N000011 HC RX IP 250 OP 636: Performed by: INTERNAL MEDICINE

## 2023-09-23 PROCEDURE — 120N000001 HC R&B MED SURG/OB

## 2023-09-23 PROCEDURE — 250N000013 HC RX MED GY IP 250 OP 250 PS 637: Performed by: INTERNAL MEDICINE

## 2023-09-23 PROCEDURE — 250N000011 HC RX IP 250 OP 636: Mod: JZ | Performed by: HOSPITALIST

## 2023-09-23 PROCEDURE — 94640 AIRWAY INHALATION TREATMENT: CPT | Mod: 76

## 2023-09-23 PROCEDURE — 999N000147 HC STATISTIC PT IP EVAL DEFER

## 2023-09-23 PROCEDURE — 99232 SBSQ HOSP IP/OBS MODERATE 35: CPT | Performed by: HOSPITALIST

## 2023-09-23 PROCEDURE — 94640 AIRWAY INHALATION TREATMENT: CPT

## 2023-09-23 PROCEDURE — 999N000157 HC STATISTIC RCP TIME EA 10 MIN

## 2023-09-23 PROCEDURE — 250N000009 HC RX 250: Performed by: INTERNAL MEDICINE

## 2023-09-23 RX ORDER — ENOXAPARIN SODIUM 100 MG/ML
40 INJECTION SUBCUTANEOUS EVERY 24 HOURS
Status: DISCONTINUED | OUTPATIENT
Start: 2023-09-23 | End: 2023-09-24 | Stop reason: HOSPADM

## 2023-09-23 RX ADMIN — DOXYCYCLINE HYCLATE 100 MG: 100 CAPSULE ORAL at 10:05

## 2023-09-23 RX ADMIN — PANTOPRAZOLE SODIUM 40 MG: 40 TABLET, DELAYED RELEASE ORAL at 20:07

## 2023-09-23 RX ADMIN — AMPICILLIN SODIUM AND SULBACTAM SODIUM 3 G: 2; 1 INJECTION, POWDER, FOR SOLUTION INTRAMUSCULAR; INTRAVENOUS at 05:20

## 2023-09-23 RX ADMIN — FORMOTEROL FUMARATE DIHYDRATE 20 MCG: 20 SOLUTION RESPIRATORY (INHALATION) at 07:55

## 2023-09-23 RX ADMIN — AMPICILLIN SODIUM AND SULBACTAM SODIUM 3 G: 2; 1 INJECTION, POWDER, FOR SOLUTION INTRAMUSCULAR; INTRAVENOUS at 16:20

## 2023-09-23 RX ADMIN — FUROSEMIDE 30 MG: 20 TABLET ORAL at 10:06

## 2023-09-23 RX ADMIN — FORMOTEROL FUMARATE DIHYDRATE 20 MCG: 20 SOLUTION RESPIRATORY (INHALATION) at 21:13

## 2023-09-23 RX ADMIN — SENNOSIDES AND DOCUSATE SODIUM 1 TABLET: 50; 8.6 TABLET ORAL at 10:07

## 2023-09-23 RX ADMIN — METOPROLOL TARTRATE 12.5 MG: 25 TABLET, FILM COATED ORAL at 20:08

## 2023-09-23 RX ADMIN — DOXYCYCLINE HYCLATE 100 MG: 100 CAPSULE ORAL at 20:07

## 2023-09-23 RX ADMIN — ENOXAPARIN SODIUM 40 MG: 40 INJECTION SUBCUTANEOUS at 16:11

## 2023-09-23 RX ADMIN — ALLOPURINOL 300 MG: 300 TABLET ORAL at 10:06

## 2023-09-23 RX ADMIN — AMPICILLIN SODIUM AND SULBACTAM SODIUM 3 G: 2; 1 INJECTION, POWDER, FOR SOLUTION INTRAMUSCULAR; INTRAVENOUS at 12:28

## 2023-09-23 RX ADMIN — AMPICILLIN SODIUM AND SULBACTAM SODIUM 3 G: 2; 1 INJECTION, POWDER, FOR SOLUTION INTRAMUSCULAR; INTRAVENOUS at 22:30

## 2023-09-23 RX ADMIN — ASPIRIN 81 MG: 81 TABLET, COATED ORAL at 10:06

## 2023-09-23 RX ADMIN — METOPROLOL TARTRATE 12.5 MG: 25 TABLET, FILM COATED ORAL at 10:06

## 2023-09-23 RX ADMIN — PANTOPRAZOLE SODIUM 40 MG: 40 TABLET, DELAYED RELEASE ORAL at 10:05

## 2023-09-23 RX ADMIN — POLYETHYLENE GLYCOL 400 AND PROPYLENE GLYCOL 2 DROP: 4; 3 SOLUTION/ DROPS OPHTHALMIC at 10:07

## 2023-09-23 RX ADMIN — IPRATROPIUM BROMIDE AND ALBUTEROL SULFATE 3 ML: .5; 3 SOLUTION RESPIRATORY (INHALATION) at 07:55

## 2023-09-23 RX ADMIN — PAROXETINE HYDROCHLORIDE 50 MG: 10 TABLET, FILM COATED ORAL at 10:05

## 2023-09-23 ASSESSMENT — ACTIVITIES OF DAILY LIVING (ADL)
ADLS_ACUITY_SCORE: 53
ADLS_ACUITY_SCORE: 51
ADLS_ACUITY_SCORE: 57
ADLS_ACUITY_SCORE: 51
ADLS_ACUITY_SCORE: 53
ADLS_ACUITY_SCORE: 57
ADLS_ACUITY_SCORE: 51
ADLS_ACUITY_SCORE: 57

## 2023-09-23 NOTE — PLAN OF CARE
PT: Orders received. Chart reviewed and discussed with care team.  PT not indicated due to patient being total assist at baseline, utilizes aleks lift for transfers. Pt with plans to return to LTC facility with resumption of cares.  Defer discharge recommendations to medical team.  Will complete orders.

## 2023-09-23 NOTE — CARE PLAN
OT: Orders rec'd. Patient receives A with all ADLs at baseline. OT eval not indicated here. Plan is to return to LTC with continuation of services. Order complete.

## 2023-09-23 NOTE — PLAN OF CARE
Date: 9/23/2023 [id number]    Summary: R lung pneumonia. UTI. Chronic chirinos. Contact for VRE.  Orientation: A&Ox4   Behavior Color: green   CIWA: na   VS/O2: VSS on 2L NC - baseline  Mobility: Lift, T&R q2h. Hemiparesis L  Pain: Denies  Diet: Minced & moist, mildly thick. Mod carb.  B/B: Chronic chirinos for retention. Incont BM x1.  ABNL LAB: none  Drain/Device: R PIV SL, int abx  Tele: none  Skin: Buttock wound, wound care done x1  Test/Procedures: none  D/C Goal/Place: Discharge pending improvement. R LS crackles. Dentures in.

## 2023-09-23 NOTE — PLAN OF CARE
Goal Outcome Evaluation:       Top portion must ALWAYS be completed  PRIMARY Concern: pneumonia  SAFETY RISK Concerns (fall risk, behaviors, etc.): y      Tests/Procedures for NEXT shift: n  Consults? (Pending/following, signed-off?) speech, wocc  Where is patient from? (Home, TCU, etc.): nursing home  Other Important info for NEXT shift: aspiration precautions  Anticipated DC date & active delays: TBD  _____________________________________________________________________________  SUMMARY NOTE:              (either paste note here OR complete the info below- RN to choose one- delete info below if not used)  Orientation/Cognitive: alert and oriented  Observation Goals (Met/ Not Met): n  Mobility Level/Assist Equipment: bedrest  Antibiotics & Plan (IV/po, length of tx left): antibiotics iv  Pain Management: n  Tele/VS/O2: vss 2liters nasel cannula at baseline  ABNL Lab/BG: urine culture positive  Diet: minced meat, thickened liquids  Bowel/Bladder: chirinos, incontinent of urine  Skin Concerns: wound coccyx, pillow support, bed and repo  Drains/Devices: chirinos  Patient Stated Goal for Today: y

## 2023-09-23 NOTE — PROGRESS NOTES
[facility]    Medicine Progress Note - Hospitalist Service    Date of Admission:  9/20/2023    Assessment & Plan   Principal Problem:    Aspiration pneumonia of right lower lobe, unspecified aspiration pneumonia type (H)  Active Problems:    Major depressive disorder, recurrent episode (H)    COPD (chronic obstructive pulmonary disease) (H)    Morbid obesity, unspecified obesity type (H)    Type 2 diabetes mellitus without complication, without long-term current use of insulin (H)    Hemiparesis affecting left side as late effect of cerebrovascular accident (CVA) (H)    DNR (do not resuscitate)    Chronic indwelling Cardona catheter      Possible aspiration pneumonia  Oropharyngeal dysphagia  Urine culture with multiple organisms-possible colonization with indwelling Cardona catheter  -Has known moderate oropharyngeal dysphagia and is on minced and moist diet with mildly thick liquids  -Has chronic cough which has worsened in the last few days, and coughing up some phlegm.  -Afebrile, no leukocytosis, normal lactate.  COVID-19/influenza/RSV negative  -Chest x-ray showed right basilar infiltrate concerning for pneumonia  He is started on IV Unasyn and doxycycline we will continue with that.  Speech is consulted, recommend video swallow study-FINDINGS:    Flash penetration with mildly thick consistency. Refer to speech  pathology report.  At this time overall improved, continue with IV antibiotics.  And switch to oral once ready for discharge          COPD  Chronic hypoxic and hypercapnic respiratory failure, on 2 L of oxygen at baseline  -Does not seem to have COPD exacerbation.  Oxygen requirements are stable.  Saturations 92-97% on usual 2 L of oxygen  -VBG's showed normal pH of 7.37, mildly elevated PCO2 of 61  -Continue usual  nebs-formoterol, DuoNebs  -Continue supplemental oxygen at 2 L/min  Incentive spirometry and flutter.        Essential hypertension  -Continue metoprolol 12.5 mg twice  daily     Chronic diastolic CHF with preserved EF of 55-60%, echo 4/2023  -He is on Lasix 30 mg daily we will continue with that.  -Not in acute exacerbation at this time     Dyslipidemia  -Resume atorvastatin after discharge     Previous CVA  -Continue aspirin 81 mg daily     Depression  -Stable.  Continue paroxetine 50 mg daily     Diabetes mellitus type 2, diet controlled, with neuropathy  -Not on any medications.  No need to check blood sugars at this time last A1c was 6.9, will recheck tomorrow and adjust accordingly     GERD  -Continue Protonix     Chronic gout  -No acute exacerbation.  Continue allopurinol     History of stroke with left hemiparesis  -PT OT  -Chronic aspirin     Diet:  Minced and moist diet with mildly thick liquids      DVT Prophylaxis: Enoxaparin (Lovenox) SQ  Code Status: No CPR- Do NOT Intubate    Disposition: Expected discharge tomorrow if remains stable.     Diet: Combination Diet Regular Diet; Moderate Consistent Carb (60 g CHO per Meal) Diet; Minced and Moist Diet (level 5); Mildly Thick (level 2)    DVT Prophylaxis: Enoxaparin (Lovenox) SQ  Cardona Catheter: PRESENT, indication: Retention (chronic)  Lines: None     Cardiac Monitoring: None  Code Status: No CPR- Do NOT Intubate      Clinically Significant Risk Factors              # Hypoalbuminemia: Lowest albumin = 3.3 g/dL at 9/20/2023 12:41 PM, will monitor as appropriate           # DMII: A1C = N/A within past 6 months, PRESENT ON ADMISSION    # Overweight: Estimated body mass index is 29.21 kg/m  as calculated from the following:    Height as of this encounter: 1.829 m (6').    Weight as of this encounter: 97.7 kg (215 lb 6.2 oz)., PRESENT ON ADMISSION     # COPD: noted on problem list        Disposition Plan      Expected Discharge Date: 09/23/2023      Destination: assisted living  Discharge Comments: on IV ABX and oral  WOC and speech saw pt  SW in contact with [facility]    pt from [facility] Hurst  MD  Hospitalist Service  [facility]  Securely message with Malia (more info)  Text page via [employer] Paging/Directory   ______________________________________________________________________    Interval History   Feels better, denies any active shortness of breath although slightly lethargic urine culture with multiple organisms, video swallow does show some penetration.  Continue antibiotics    Physical Exam   Vital Signs: Temp: 97.9  F (36.6  C) Temp src: Oral BP: 113/71 Pulse: 79   Resp: 18 SpO2: 97 % O2 Device: Nasal cannula Oxygen Delivery: 2 LPM  Weight: 215 lbs 6.23 oz  Alert awake-although lethargic, chronic oxygen needs  Vision Baseline  Neck supple  Oral mucosa moist  bilateral air entry heard, basilar crackle  S1-S2 normal  Abdomen is soft no tenderness  Indwelling Cardona  Extremities are fully mobile and there is no visible swelling noted  No skin cyanosis  Neurologically- no new Gross deficits-baseline hemiparesis      Medical Decision Making       40 MINUTES SPENT BY ME on the date of service doing chart review, history, exam, documentation & further activities per the note.      Data         Imaging results reviewed over the past 24 hrs:   No results found for this or any previous visit (from the past 24 hour(s)).

## 2023-09-24 VITALS
OXYGEN SATURATION: 96 % | SYSTOLIC BLOOD PRESSURE: 99 MMHG | HEIGHT: 72 IN | WEIGHT: 219.4 LBS | BODY MASS INDEX: 29.72 KG/M2 | TEMPERATURE: 97.7 F | RESPIRATION RATE: 16 BRPM | DIASTOLIC BLOOD PRESSURE: 65 MMHG | HEART RATE: 73 BPM

## 2023-09-24 DIAGNOSIS — Z09 HOSPITAL DISCHARGE FOLLOW-UP: ICD-10-CM

## 2023-09-24 LAB
ANION GAP SERPL CALCULATED.3IONS-SCNC: 12 MMOL/L (ref 7–15)
BUN SERPL-MCNC: 18.8 MG/DL (ref 8–23)
CALCIUM SERPL-MCNC: 9.3 MG/DL (ref 8.8–10.2)
CHLORIDE SERPL-SCNC: 100 MMOL/L (ref 98–107)
CREAT SERPL-MCNC: 0.88 MG/DL (ref 0.67–1.17)
DEPRECATED HCO3 PLAS-SCNC: 30 MMOL/L (ref 22–29)
EGFRCR SERPLBLD CKD-EPI 2021: 86 ML/MIN/1.73M2
ERYTHROCYTE [DISTWIDTH] IN BLOOD BY AUTOMATED COUNT: 15.1 % (ref 10–15)
GLUCOSE SERPL-MCNC: 109 MG/DL (ref 70–99)
HBA1C MFR BLD: 6.5 %
HCT VFR BLD AUTO: 38 % (ref 40–53)
HGB BLD-MCNC: 11.8 G/DL (ref 13.3–17.7)
MCH RBC QN AUTO: 30.3 PG (ref 26.5–33)
MCHC RBC AUTO-ENTMCNC: 31.1 G/DL (ref 31.5–36.5)
MCV RBC AUTO: 97 FL (ref 78–100)
PLATELET # BLD AUTO: 197 10E3/UL (ref 150–450)
POTASSIUM SERPL-SCNC: 3.5 MMOL/L (ref 3.4–5.3)
RBC # BLD AUTO: 3.9 10E6/UL (ref 4.4–5.9)
SODIUM SERPL-SCNC: 142 MMOL/L (ref 136–145)
WBC # BLD AUTO: 8.7 10E3/UL (ref 4–11)

## 2023-09-24 PROCEDURE — 83036 HEMOGLOBIN GLYCOSYLATED A1C: CPT | Performed by: HOSPITALIST

## 2023-09-24 PROCEDURE — 80048 BASIC METABOLIC PNL TOTAL CA: CPT | Performed by: HOSPITALIST

## 2023-09-24 PROCEDURE — 250N000009 HC RX 250: Performed by: INTERNAL MEDICINE

## 2023-09-24 PROCEDURE — 250N000013 HC RX MED GY IP 250 OP 250 PS 637: Performed by: INTERNAL MEDICINE

## 2023-09-24 PROCEDURE — 36415 COLL VENOUS BLD VENIPUNCTURE: CPT | Performed by: HOSPITALIST

## 2023-09-24 PROCEDURE — 94640 AIRWAY INHALATION TREATMENT: CPT

## 2023-09-24 PROCEDURE — 85027 COMPLETE CBC AUTOMATED: CPT | Performed by: HOSPITALIST

## 2023-09-24 PROCEDURE — 250N000011 HC RX IP 250 OP 636: Performed by: INTERNAL MEDICINE

## 2023-09-24 PROCEDURE — 999N000157 HC STATISTIC RCP TIME EA 10 MIN

## 2023-09-24 PROCEDURE — 99239 HOSP IP/OBS DSCHRG MGMT >30: CPT | Performed by: HOSPITALIST

## 2023-09-24 RX ORDER — DOXYCYCLINE 100 MG/1
100 CAPSULE ORAL EVERY 12 HOURS
Qty: 6 CAPSULE | Refills: 0 | Status: SHIPPED | OUTPATIENT
Start: 2023-09-24 | End: 2023-09-27

## 2023-09-24 RX ORDER — FUROSEMIDE 20 MG
20 TABLET ORAL DAILY
Qty: 45 TABLET | Refills: 0 | COMMUNITY
Start: 2023-09-24

## 2023-09-24 RX ADMIN — ASPIRIN 81 MG: 81 TABLET, COATED ORAL at 09:41

## 2023-09-24 RX ADMIN — METOPROLOL TARTRATE 12.5 MG: 25 TABLET, FILM COATED ORAL at 09:41

## 2023-09-24 RX ADMIN — AMPICILLIN SODIUM AND SULBACTAM SODIUM 3 G: 2; 1 INJECTION, POWDER, FOR SOLUTION INTRAMUSCULAR; INTRAVENOUS at 06:31

## 2023-09-24 RX ADMIN — SENNOSIDES AND DOCUSATE SODIUM 1 TABLET: 50; 8.6 TABLET ORAL at 09:42

## 2023-09-24 RX ADMIN — FORMOTEROL FUMARATE DIHYDRATE 20 MCG: 20 SOLUTION RESPIRATORY (INHALATION) at 08:28

## 2023-09-24 RX ADMIN — PANTOPRAZOLE SODIUM 40 MG: 40 TABLET, DELAYED RELEASE ORAL at 09:41

## 2023-09-24 RX ADMIN — POLYETHYLENE GLYCOL 400 AND PROPYLENE GLYCOL 2 DROP: 4; 3 SOLUTION/ DROPS OPHTHALMIC at 09:42

## 2023-09-24 RX ADMIN — ALLOPURINOL 300 MG: 300 TABLET ORAL at 09:41

## 2023-09-24 RX ADMIN — IPRATROPIUM BROMIDE AND ALBUTEROL SULFATE 3 ML: .5; 3 SOLUTION RESPIRATORY (INHALATION) at 08:28

## 2023-09-24 RX ADMIN — PAROXETINE HYDROCHLORIDE 50 MG: 10 TABLET, FILM COATED ORAL at 09:41

## 2023-09-24 RX ADMIN — AMPICILLIN SODIUM AND SULBACTAM SODIUM 3 G: 2; 1 INJECTION, POWDER, FOR SOLUTION INTRAMUSCULAR; INTRAVENOUS at 11:12

## 2023-09-24 RX ADMIN — DOXYCYCLINE HYCLATE 100 MG: 100 CAPSULE ORAL at 09:41

## 2023-09-24 RX ADMIN — FUROSEMIDE 30 MG: 20 TABLET ORAL at 09:41

## 2023-09-24 ASSESSMENT — ACTIVITIES OF DAILY LIVING (ADL)
ADLS_ACUITY_SCORE: 53
ADLS_ACUITY_SCORE: 53
ADLS_ACUITY_SCORE: 72
ADLS_ACUITY_SCORE: 53
ADLS_ACUITY_SCORE: 53
ADLS_ACUITY_SCORE: 72
ADLS_ACUITY_SCORE: 53

## 2023-09-24 NOTE — PROGRESS NOTES
Care Management Discharge Note    Discharge Date: 09/24/2023 at      Discharge Disposition: Assisted Living    Discharge Services: Transportation Services    Discharge DME:      Discharge Transportation: agency    Private pay costs discussed: Not applicable    Does the patient's insurance plan have a 3 day qualifying hospital stay waiver?  No    PAS Confirmation Code:  na  Patient/family educated on Medicare website which has current facility and service quality ratings: no    Education Provided on the Discharge Plan:  yes  Persons Notified of Discharge Plans: [facility] team  Patient/Family in Agreement with the Plan: unable to assess    Handoff Referral Completed: No    Additional Information:  Spoke with nurse at [facility] (phone [phone], fax  [phone] ) who gave my number to Shireen dolan's  for discharge planning.  Received callback; she is fully supportive of return to [facility] with resumed home services ( [facility] ).  AINSLEY-RN communicated with provider who entered home with home sam orders which were faxed to nurse at [facility]--meds will be filled at hospital and sent with pt.      Date of ride: 09/24/23  Time of ride: 1:50-2:40pm  Method of ride: stretcher   [facility]  [phone]  From: [facility] room 5503   To: [facility] [facility] , [address], [place] [address]    Weight: 233 pounds, VRE contact  infection precautions, baseline 2L O2    Updated bedside staff, and patient who appreciates the update.    Keyla Quinteros RN, BSN, PHN  [facility]  Inpatient Care Management - FLOAT  Short Stay [facility] RN Mobile: [phone] daily 7:30-4:00

## 2023-09-24 NOTE — PLAN OF CARE
Pt adequate for discharge back to [facility]. CC set up stretcher ride. A&Ox4. VSS on 2L, pt's baseline. A2-lift, turn and repo. Chronic chirinos, exchanged per MD, 18 Uzbek straight cath. Mildly thick liquids, minced and moist mod carb diet. Pills whole in yogurt today.  PIV removed. Wound on buttocks, wound cares completed, supplies sent with patient. Discharge paperwork explained to pt, discharge medications explained to pt. All questions answered. No further questions. Assistance getting onto stretcher.

## 2023-09-24 NOTE — DISCHARGE SUMMARY
[facility]  Hospitalist Discharge Summary      Date of Admission:  9/20/2023  Date of Discharge:  9/24/2023  Discharging Provider: Duane Hurst MD  Discharge Service: Hospitalist Service    Discharge Diagnoses   Principal Problem:    Aspiration pneumonia of right lower lobe, unspecified aspiration pneumonia type (H)  Active Problems:    Major depressive disorder, recurrent episode (H)    COPD (chronic obstructive pulmonary disease) (H)    Morbid obesity, unspecified obesity type (H)    Type 2 diabetes mellitus without complication, without long-term current use of insulin (H)    Hemiparesis affecting left side as late effect of cerebrovascular accident (CVA) (H)    DNR (do not resuscitate)    Chronic indwelling Chirinos catheter    Possible aspiration pneumonia  Oropharyngeal dysphagia  Urine culture with multiple organisms-possible colonization with indwelling Chirinos catheter, chirinos exchanged on 9/1 last- will request exchange sooner.  -Has known moderate oropharyngeal dysphagia and is on minced and moist diet with mildly thick liquids  -Has chronic cough which has worsened in the last few days, and coughing up some phlegm.  -Afebrile, no leukocytosis, normal lactate.  COVID-19/influenza/RSV negative  -Chest x-ray showed right basilar infiltrate concerning for pneumonia  He was started on IV Unasyn and doxycycline   Speech is consulted, recommended video swallow study-FINDINGS:    Flash penetration with mildly thick consistency. Refer to speech  pathology report.  At this time overall improved, continue with IV antibiotics.    And switch to oral today- ready for discharge WITH PO augemntin   Return [facility] to [facility] with HHC- RN  for chirinos care, ST  for swallow, lift dependent at baseline, PT/OT appreciated           COPD  Chronic hypoxic and hypercapnic respiratory failure, on 2 L of oxygen at baseline  -Does not seem to have COPD exacerbation.  Oxygen requirements are stable.  Saturations  92-97% on usual 2 L of oxygen  -VBG's showed normal pH of 7.37, mildly elevated PCO2 of 61  -Continue supplemental oxygen at 2 L/min          Essential hypertension  -Continue metoprolol 12.5 mg twice daily     Chronic diastolic CHF with preserved EF of 55-60%, echo 4/2023  -He is on Lasix 30 mg daily , with Thickened liquids- decreased to 20 mg daily  -Not in acute exacerbation at this time     Dyslipidemia  -Resume atorvastatin after discharge     Previous CVA  -Continue aspirin 81 mg daily     Depression  -Stable.  Continue paroxetine 50 mg daily     Diabetes mellitus type 2, diet controlled, with neuropathy  -Not on any medications.  No need to check blood sugars at this time =A1c was 6.9,     GERD  -Continue Protonix     Chronic gout  -No acute exacerbation.  Continue allopurinol     History of stroke with left hemiparesis  -PT OT  -Chronic aspirin     Diet:  Minced and moist diet with mildly thick liquids        DVT Prophylaxis: Enoxaparin (Lovenox) subcutaneous was given  Code Status: No CPR- Do NOT Intubate    Clinically Significant Risk Factors     # DMII: A1C = 6.5 % (Ref range: <5.7 %) within past 6 months  # Overweight: Estimated body mass index is 29.76 kg/m  as calculated from the following:    Height as of this encounter: 1.829 m (6').    Weight as of this encounter: 99.5 kg (219 lb 6.4 oz).       Follow-ups Needed After Discharge   Follow-up Appointments     Follow-up and recommended labs and tests       Follow up with primary care provider, Kalen Metcalf, within 7 days for   hospital follow- up.  No follow up labs or test are needed.            Unresulted Labs Ordered in the Past 30 Days of this Admission       No orders found from 8/21/2023 to 9/21/2023.        These results will be followed up by PCP    Discharge Disposition   Discharged to assisted living  Condition at discharge: Stable    Consultations This Hospital Stay   SPEECH LANGUAGE PATH ADULT IP CONSULT  CARE MANAGEMENT / SOCIAL WORK IP  CONSULT  WOUND OSTOMY CONTINENCE NURSE  IP CONSULT  VASCULAR ACCESS ADULT IP CONSULT  PHYSICAL THERAPY ADULT IP CONSULT  OCCUPATIONAL THERAPY ADULT IP CONSULT    Code Status   No CPR- Do NOT Intubate    Time Spent on this Encounter   I, Duane Hurst MD, personally saw the patient today and spent greater than 30 minutes discharging this patient.       Duane Hurst MD  [facility]  [address] [address]  Phone: [phone]  ______________________________________________________________________    Physical Exam   Vital Signs: Temp: 97.7  F (36.5  C) Temp src: Oral BP: 99/65 Pulse: 73   Resp: 16 SpO2: 96 % O2 Device: Nasal cannula Oxygen Delivery: 2 LPM  Weight: 219 lbs 6.4 oz  Stable, lungs cleared       Primary Care Physician   Kalen Metcalf    Discharge Orders      Home Care Referral      Reason for your hospital stay    Aspiration PNA     Follow-up and recommended labs and tests     Follow up with primary care provider, Kalen Metcalf, within 7 days for hospital follow- up.  No follow up labs or test are needed.     Activity    Your activity upon discharge: as previous     No CPR- Do NOT Intubate     Diet    Follow this diet upon discharge: Orders Placed This Encounter      Combination Diet Regular Diet; Moderate Consistent Carb (60 g CHO per Meal) Diet; Minced and Moist Diet (level 5); Mildly Thick (level 2)       Significant Results and Procedures   Results for orders placed or performed during the hospital encounter of 09/20/23   CT Head w/o Contrast    Narrative    EXAM: CT HEAD W/O CONTRAST  9/20/2023 1:19 PM     HISTORY: altered mental status       COMPARISON: 1/30/2023    TECHNIQUE: Using multidetector thin collimation helical acquisition  technique, axial, coronal and sagittal CT images from the skull base  to the vertex were obtained without intravenous contrast.   (topogram) image(s) also obtained and reviewed. Dose  reduction  techniques were used.    FINDINGS:  No acute intracranial hemorrhage, mass effect, or midline shift.  Stable subcortical, periventricular areas of white matter  hypoattenuation. Mild parenchymal volume loss, compensatory  ventricular dilatation. Stable bilateral basal ganglia lacunar  infarcts. No CT findings of acute infarct or hydrocephalus. Preserved  subarachnoid spaces.    Atraumatic calvarium. No substantial paranasal sinus mucosal disease.  Clear mastoid air cells. Nonfocal orbits.       Impression    IMPRESSION:   1. No acute intracranial pathology.  2. Stable brain atrophy and presumed sequela of chronic microvascular  ischemic disease, as detailed.    LUÍS STRICKLAND DO         SYSTEM ID:  [id number]   Chest XR,  PA & LAT    Narrative    CHEST TWO VIEWS September 20, 2023 1:01 PM     HISTORY: Worsening cough.    COMPARISON: 5/4/2023.      Impression    IMPRESSION: Shallow inspiration. Opacity at the right lung base  medially is suspicious for pneumonia. The left lung is clear. Loop  recorder device is projected over the left chest laterally. No pleural  effusions are seen, although lateral view is degraded related to  patient body habitus and arm positioning.    BRUNO PAYAN MD         SYSTEM ID:  [id number]   XR Video Swallow with SLP or OT    Narrative    VIDEO SWALLOWING EVALUATION   9/22/2023 10:39 AM     HISTORY: aspiration    COMPARISON: None.    FLUOROSCOPY TIME: 1.7 minutes.  SPOT IMAGES OR CINE RUNS: 9    FINDINGS:    Flash penetration with mildly thick consistency. Refer to speech  pathology report.    NADYA BONILLA MD         SYSTEM ID:  [id number]       Discharge Medications   Current Discharge Medication List        START taking these medications    Details   amoxicillin-clavulanate (AUGMENTIN) 875-125 MG tablet Take 1 tablet by mouth 2 times daily for 5 days  Qty: 10 tablet, Refills: 0    Associated Diagnoses: Aspiration pneumonia of both lower lobes due to regurgitated food (H)       doxycycline hyclate (VIBRAMYCIN) 100 MG capsule Take 1 capsule (100 mg) by mouth every 12 hours for 3 days  Qty: 6 capsule, Refills: 0    Associated Diagnoses: Aspiration pneumonia of both lower lobes due to regurgitated food (H)           CONTINUE these medications which have CHANGED    Details   furosemide (LASIX) 20 MG tablet Take 1 tablet (20 mg) by mouth daily  Qty: 45 tablet, Refills: 0    Associated Diagnoses: Acute diastolic congestive heart failure (H)           CONTINUE these medications which have NOT CHANGED    Details   acetaminophen (TYLENOL) 325 MG tablet Take 650 mg by mouth every 4 hours as needed for mild pain as needed for pain/fever Max dose 3000mg/24hr      allopurinol (ZYLOPRIM) 300 MG tablet Take 300 mg by mouth every morning      aspirin (ASA) 81 MG EC tablet Take 1 tablet (81 mg) by mouth daily  Qty:      Associated Diagnoses: Acute and chronic respiratory failure with hypoxia (H)      atorvastatin (LIPITOR) 10 MG tablet Take 10 mg by mouth every morning      Emollient (AMLACTIN ULTRA EX) Apply topically daily as needed TO FEET      formoterol (PERFOROMIST) 20 MCG/2ML neb solution Take 2 mLs (20 mcg) by nebulization every 12 hours for 60 days  Qty: 120 mL, Refills: 1    Comments: Future refills by PCP Dr. Kalen Metcalf with phone number [phone].  Associated Diagnoses: Chronic respiratory failure with hypoxia (H)      !! hypromellose (ARTIFICIAL TEARS) 0.5 % SOLN ophthalmic solution Place 2 drops into both eyes 2 times daily      !! hypromellose (ARTIFICIAL TEARS) 0.5 % SOLN ophthalmic solution Place 2 drops into both eyes 2 times daily as needed for dry eyes      !! ipratropium - albuterol 0.5 mg/2.5 mg/3 mL (DUONEB) 0.5-2.5 (3) MG/3ML neb solution Take 1 vial by nebulization daily as needed for shortness of breath, wheezing or cough      !! ipratropium - albuterol 0.5 mg/2.5 mg/3 mL (DUONEB) 0.5-2.5 (3) MG/3ML neb solution Take 1 vial by nebulization 3 times daily 0900, 1400, 2100       ipratropium-albuterol (COMBIVENT RESPIMAT)  MCG/ACT inhaler Inhale 1 puff into the lungs 4 times daily 0900, 1200 ,1600 ,2000      loperamide (IMODIUM) 2 MG capsule Take 2 mg by mouth every 6 hours as needed for diarrhea      methenamine hippurate (HIPREX) 1 g tablet Take 1 g by mouth 2 times daily      metoprolol tartrate (LOPRESSOR) 25 MG tablet Take 0.5 tablets (12.5 mg) by mouth 2 times daily    Associated Diagnoses: Cerebrovascular accident (CVA), unspecified mechanism (H)      pantoprazole (PROTONIX) 40 MG EC tablet Take 40 mg by mouth 2 times daily      !! PARoxetine (PAXIL) 10 MG tablet Take 10 mg by mouth every morning Take with 40mg tablet to equal 50mg total      !! PARoxetine (PAXIL) 40 MG tablet Take 40 mg by mouth every morning Take with 10mg tablet to equal 50mg total      !! senna-docusate (SENOKOT-S/PERICOLACE) 8.6-50 MG tablet Take 1 tablet by mouth daily      !! senna-docusate (SENOKOT-S/PERICOLACE) 8.6-50 MG tablet Take 1 tablet by mouth daily as needed for constipation      simethicone (MYLICON) 125 MG chewable tablet Take 125 mg by mouth 3 times daily as needed for intestinal gas      !! Skin Protectants, Misc. (LANTISEPTIC SKIN PROTECTANT EX) Externally apply topically 2 times daily as needed Apply a thin film to vincent area/buttocks      !! Skin Protectants, Misc. (LANTISEPTIC SKIN PROTECTANT EX) Externally apply topically 2 times daily Apply a thin film to vincent area/buttocks      Vitamin D3 (VITAMIN D, CHOLECALCIFEROL,) 25 mcg (1000 units) tablet Take 1 tablet by mouth daily       !! - Potential duplicate medications found. Please discuss with provider.        Allergies   No Known Allergies

## 2023-09-24 NOTE — PLAN OF CARE
Goal Outcome Evaluation:       Top portion must ALWAYS be completed  PRIMARY Concern: pneumonia  SAFETY RISK Concerns (fall risk, behaviors, etc.): fall risk, aspiration      Tests/Procedures for NEXT shift: n  Consults? (Pending/following, signed-off?) speech  Where is patient from? (Home, TCU, etc.): long term  Other Important info for NEXT shift: n  Anticipated DC date & active delays: TBD  _____________________________________________________________________________  SUMMARY NOTE:              (either paste note here OR complete the info below- RN to choose one- delete info below if not used)  Orientation/Cognitive: alert and oriented  Observation Goals (Met/ Not Met): n  Mobility Level/Assist Equipment: bedrest  Antibiotics & Plan (IV/po, length of tx left): unisyn  Pain Management: n  Tele/VS/O2: vs with soft blood pressure oxygen at baseline  ABNL Lab/BG: urine positive  Diet: minced meat thickened liquid  Bowel/Bladder: incontinent, chirinos  Skin Concerns: sacral wunds  Drains/Devices: chirinos  Patient Stated Goal for Today: y

## 2023-09-25 NOTE — PROGRESS NOTES
Speech Language Therapy Discharge Summary    Reason for therapy discharge:    Discharged to TCU/SNF    Progress towards therapy goal(s). See goals on Care Plan in [facility] electronic health record for goal details.  Goals partially met.  Barriers to achieving goals:   discharge from facility.    Therapy recommendation(s):    See below           09/22/23 6299   SLP Discharge Planning   SLP Plan Meal follow up and double swallow strategy with liquids.   SLP Discharge Recommendation Transitional Care Facility  (Back to SNF with ongoing SLP.)   SLP Rationale for DC Rec Patient could benefit from follow up SLP services for compensatory strategy training and carryover for diet modification awareness and restrictions.   SLP Brief overview of current status  Video swallow study completed: there was flash penetration of mildly thick liquids due to premature entry with large sips, but no aspiration.  Recommend continue minced and moist diet with mildly thick liquids, patient feeding self small bites/sips, upright at 90 degrees, ensure enough moisture with foods like scrambled eggs (should be a cohesive bite with sauce added as needed), double swallow to clear oropharyngeal residue with each bite and sip.

## 2023-11-10 NOTE — ED NOTES
Bed: ED01  Expected date:   Expected time:   Means of arrival:   Comments:  stabe 2     Pt CC is chest pain that's radiating down left arm into her neck and upper abdominal pain that started about an hour ago that woke her out of her sleep. Pt took 325mg aspirin prior to coming.  Pt also having n/v.

## 2024-04-28 ENCOUNTER — HOSPITAL ENCOUNTER (EMERGENCY)
Facility: CLINIC | Age: 84
Discharge: HOME | End: 2024-04-28
Attending: EMERGENCY MEDICINE | Admitting: EMERGENCY MEDICINE
Payer: MEDICARE

## 2024-04-28 VITALS
SYSTOLIC BLOOD PRESSURE: 161 MMHG | OXYGEN SATURATION: 94 % | BODY MASS INDEX: 28.23 KG/M2 | HEIGHT: 74 IN | WEIGHT: 220 LBS | DIASTOLIC BLOOD PRESSURE: 91 MMHG | RESPIRATION RATE: 20 BRPM | HEART RATE: 98 BPM | TEMPERATURE: 98.9 F

## 2024-04-28 DIAGNOSIS — T83.011A MALFUNCTION OF FOLEY CATHETER, INITIAL ENCOUNTER (H): ICD-10-CM

## 2024-04-28 DIAGNOSIS — N39.0 ACUTE UTI: ICD-10-CM

## 2024-04-28 LAB
ALBUMIN UR-MCNC: NEGATIVE MG/DL
ANION GAP SERPL CALCULATED.3IONS-SCNC: 12 MMOL/L (ref 7–15)
APPEARANCE UR: CLEAR
BACTERIA #/AREA URNS HPF: ABNORMAL /HPF
BASOPHILS # BLD AUTO: 0 10E3/UL (ref 0–0.2)
BASOPHILS NFR BLD AUTO: 0 %
BILIRUB UR QL STRIP: NEGATIVE
BUN SERPL-MCNC: 20.2 MG/DL (ref 8–23)
CALCIUM SERPL-MCNC: 9.1 MG/DL (ref 8.8–10.2)
CHLORIDE SERPL-SCNC: 99 MMOL/L (ref 98–107)
COLOR UR AUTO: ABNORMAL
CREAT SERPL-MCNC: 0.82 MG/DL (ref 0.67–1.17)
DEPRECATED HCO3 PLAS-SCNC: 28 MMOL/L (ref 22–29)
EGFRCR SERPLBLD CKD-EPI 2021: 88 ML/MIN/1.73M2
EOSINOPHIL # BLD AUTO: 0.4 10E3/UL (ref 0–0.7)
EOSINOPHIL NFR BLD AUTO: 4 %
ERYTHROCYTE [DISTWIDTH] IN BLOOD BY AUTOMATED COUNT: 14.9 % (ref 10–15)
GLUCOSE SERPL-MCNC: 150 MG/DL (ref 70–99)
GLUCOSE UR STRIP-MCNC: NEGATIVE MG/DL
HCT VFR BLD AUTO: 40.4 % (ref 40–53)
HGB BLD-MCNC: 12.7 G/DL (ref 13.3–17.7)
HGB UR QL STRIP: ABNORMAL
IMM GRANULOCYTES # BLD: 0 10E3/UL
IMM GRANULOCYTES NFR BLD: 0 %
KETONES UR STRIP-MCNC: NEGATIVE MG/DL
LEUKOCYTE ESTERASE UR QL STRIP: ABNORMAL
LYMPHOCYTES # BLD AUTO: 1.2 10E3/UL (ref 0.8–5.3)
LYMPHOCYTES NFR BLD AUTO: 12 %
MCH RBC QN AUTO: 30.4 PG (ref 26.5–33)
MCHC RBC AUTO-ENTMCNC: 31.4 G/DL (ref 31.5–36.5)
MCV RBC AUTO: 97 FL (ref 78–100)
MONOCYTES # BLD AUTO: 0.9 10E3/UL (ref 0–1.3)
MONOCYTES NFR BLD AUTO: 9 %
NEUTROPHILS # BLD AUTO: 7.4 10E3/UL (ref 1.6–8.3)
NEUTROPHILS NFR BLD AUTO: 75 %
NITRATE UR QL: POSITIVE
NRBC # BLD AUTO: 0 10E3/UL
NRBC BLD AUTO-RTO: 0 /100
PH UR STRIP: 7 [PH] (ref 5–7)
PLATELET # BLD AUTO: 192 10E3/UL (ref 150–450)
POTASSIUM SERPL-SCNC: 4.2 MMOL/L (ref 3.4–5.3)
RBC # BLD AUTO: 4.18 10E6/UL (ref 4.4–5.9)
RBC URINE: 36 /HPF
SODIUM SERPL-SCNC: 139 MMOL/L (ref 135–145)
SP GR UR STRIP: 1.01 (ref 1–1.03)
SQUAMOUS EPITHELIAL: <1 /HPF
UROBILINOGEN UR STRIP-MCNC: NORMAL MG/DL
WBC # BLD AUTO: 9.8 10E3/UL (ref 4–11)
WBC URINE: 42 /HPF

## 2024-04-28 PROCEDURE — 85025 COMPLETE CBC W/AUTO DIFF WBC: CPT | Performed by: EMERGENCY MEDICINE

## 2024-04-28 PROCEDURE — 99284 EMERGENCY DEPT VISIT MOD MDM: CPT | Mod: 25

## 2024-04-28 PROCEDURE — 87186 SC STD MICRODIL/AGAR DIL: CPT | Performed by: EMERGENCY MEDICINE

## 2024-04-28 PROCEDURE — 80048 BASIC METABOLIC PNL TOTAL CA: CPT | Performed by: EMERGENCY MEDICINE

## 2024-04-28 PROCEDURE — 36415 COLL VENOUS BLD VENIPUNCTURE: CPT | Performed by: EMERGENCY MEDICINE

## 2024-04-28 PROCEDURE — 250N000009 HC RX 250: Performed by: EMERGENCY MEDICINE

## 2024-04-28 PROCEDURE — 99283 EMERGENCY DEPT VISIT LOW MDM: CPT

## 2024-04-28 PROCEDURE — 51702 INSERT TEMP BLADDER CATH: CPT

## 2024-04-28 PROCEDURE — 51798 US URINE CAPACITY MEASURE: CPT

## 2024-04-28 PROCEDURE — 81001 URINALYSIS AUTO W/SCOPE: CPT | Performed by: EMERGENCY MEDICINE

## 2024-04-28 PROCEDURE — 250N000013 HC RX MED GY IP 250 OP 250 PS 637: Performed by: EMERGENCY MEDICINE

## 2024-04-28 PROCEDURE — 87086 URINE CULTURE/COLONY COUNT: CPT | Performed by: EMERGENCY MEDICINE

## 2024-04-28 RX ORDER — SULFAMETHOXAZOLE/TRIMETHOPRIM 800-160 MG
1 TABLET ORAL ONCE
Status: COMPLETED | OUTPATIENT
Start: 2024-04-28 | End: 2024-04-28

## 2024-04-28 RX ORDER — LIDOCAINE HYDROCHLORIDE 20 MG/ML
JELLY TOPICAL
Status: DISCONTINUED
Start: 2024-04-28 | End: 2024-04-28 | Stop reason: HOSPADM

## 2024-04-28 RX ORDER — LIDOCAINE HYDROCHLORIDE 20 MG/ML
10 JELLY TOPICAL ONCE
Status: COMPLETED | OUTPATIENT
Start: 2024-04-28 | End: 2024-04-28

## 2024-04-28 RX ORDER — SULFAMETHOXAZOLE/TRIMETHOPRIM 800-160 MG
1 TABLET ORAL 2 TIMES DAILY
Qty: 14 TABLET | Refills: 0 | Status: SHIPPED | OUTPATIENT
Start: 2024-04-28 | End: 2024-05-05

## 2024-04-28 RX ADMIN — SULFAMETHOXAZOLE AND TRIMETHOPRIM 1 TABLET: 800; 160 TABLET ORAL at 11:42

## 2024-04-28 RX ADMIN — LIDOCAINE HYDROCHLORIDE 10 ML: 20 JELLY TOPICAL at 10:03

## 2024-04-28 ASSESSMENT — ACTIVITIES OF DAILY LIVING (ADL)
ADLS_ACUITY_SCORE: 39

## 2024-04-28 ASSESSMENT — COLUMBIA-SUICIDE SEVERITY RATING SCALE - C-SSRS
6. HAVE YOU EVER DONE ANYTHING, STARTED TO DO ANYTHING, OR PREPARED TO DO ANYTHING TO END YOUR LIFE?: NO
2. HAVE YOU ACTUALLY HAD ANY THOUGHTS OF KILLING YOURSELF IN THE PAST MONTH?: NO
1. IN THE PAST MONTH, HAVE YOU WISHED YOU WERE DEAD OR WISHED YOU COULD GO TO SLEEP AND NOT WAKE UP?: NO

## 2024-04-28 NOTE — ED PROVIDER NOTES
History     Chief Complaint:  Catheter Problem       HPI   Ron Gilmore is an 83 year old male brought in by ambulance from his nursing home due to lower abdominal pain and a problem with his Cardona catheter.  Apparently, this current Cardona catheter has been in place for about 3 weeks, however he has had Cardona catheters for about 7 years.  He states that about 2 AM is when his pain started and he felt like he needed to urinate.  There was still some yellow urine in the Cardona catheter but it was not draining well.  He denies any nausea or vomiting or any fevers.  He also does not have any other abdominal pain or back pain.  He has no further complaints at this time.  Of note, he recently completed a course of Keflex for a presumed UTI as well.  Additionally, he is in the nursing home due to a prior stroke and left hemiparesis.      Independent Historian:   None - Patient Only    Review of External Notes:   I reviewed the discharge summary from 9/24/2023.      Medications:    sulfamethoxazole-trimethoprim (BACTRIM DS) 800-160 MG tablet  acetaminophen (TYLENOL) 325 MG tablet  allopurinol (ZYLOPRIM) 300 MG tablet  aspirin (ASA) 81 MG EC tablet  atorvastatin (LIPITOR) 10 MG tablet  Emollient (AMLACTIN ULTRA EX)  formoterol (PERFOROMIST) 20 MCG/2ML neb solution  furosemide (LASIX) 20 MG tablet  hypromellose (ARTIFICIAL TEARS) 0.5 % SOLN ophthalmic solution  hypromellose (ARTIFICIAL TEARS) 0.5 % SOLN ophthalmic solution  ipratropium - albuterol 0.5 mg/2.5 mg/3 mL (DUONEB) 0.5-2.5 (3) MG/3ML neb solution  ipratropium - albuterol 0.5 mg/2.5 mg/3 mL (DUONEB) 0.5-2.5 (3) MG/3ML neb solution  ipratropium-albuterol (COMBIVENT RESPIMAT)  MCG/ACT inhaler  loperamide (IMODIUM) 2 MG capsule  methenamine hippurate (HIPREX) 1 g tablet  metoprolol tartrate (LOPRESSOR) 25 MG tablet  pantoprazole (PROTONIX) 40 MG EC tablet  PARoxetine (PAXIL) 10 MG tablet  PARoxetine (PAXIL) 40 MG tablet  senna-docusate (SENOKOT-S/PERICOLACE)  8.6-50 MG tablet  senna-docusate (SENOKOT-S/PERICOLACE) 8.6-50 MG tablet  simethicone (MYLICON) 125 MG chewable tablet  Skin Protectants, Misc. (LANTISEPTIC SKIN PROTECTANT EX)  Skin Protectants, Misc. (LANTISEPTIC SKIN PROTECTANT EX)  Vitamin D3 (VITAMIN D, CHOLECALCIFEROL,) 25 mcg (1000 units) tablet        Past Medical History:    Past Medical History:   Diagnosis Date    BPH (benign prostatic hyperplasia)     Cataract     Cholelithiasis     COPD (chronic obstructive pulmonary disease) (H)     Depression     Diabetes mellitus     Dyshidrotic foot dermatitis     Edema     Gout     Hyperlipidemia     Hypertension     Infection due to 2019 novel coronavirus 5/1/2021    Kidney stones     Lumbago     Lumbar disc displacement without myelopathy     Muscle weakness     Neuropathy, diabetic (H)     Obesity     Spinal stenosis     Stroke (H)     Unsteady gait     Urinary retention with incomplete bladder emptying     UTI (urinary tract infection)     Vasovagal episode        Past Surgical History:    Past Surgical History:   Procedure Laterality Date    APPENDECTOMY OPEN      ARTHROSCOPY SHOULDER ROTATOR CUFF REPAIR      cataracts Bilateral     CHOLECYSTECTOMY      COLONOSCOPY  1986    COLONOSCOPY N/A 5/29/2021    Procedure: COLONOSCOPY;  Surgeon: Kofi Davis MD;  Location:  GI    CYSTOSCOPY  10/19/2011    Procedure:CYSTOSCOPY; CYSTOSCOPY, BLADDER STONE REMOVAL; Surgeon:ROB SAWYER; Location: OR    CYSTOSCOPY, TRANSURETHRAL RESECTION (TUR) PROSTATE, COMBINED N/A 2/21/2018    Procedure: COMBINED CYSTOSCOPY, TRANSURETHRAL RESECTION (TUR) PROSTATE;  COMBINED CYSTOSCOPY, TRANSURETHRAL RESECTION (TUR) PROSTATE ;  Surgeon: Rob Sawyer MD;  Location:  OR    EP LOOP RECORDER IMPLANT N/A 1/20/2020    Procedure: EP Loop Recorder Implant;  Surgeon: Evgeny Parisi MD;  Location:  HEART CARDIAC CATH LAB    ESOPHAGOSCOPY, GASTROSCOPY, DUODENOSCOPY (EGD), COMBINED N/A 5/28/2021    Procedure:  "ESOPHAGOGASTRODUODENOSCOPY (EGD);  Surgeon: Aurora Waterman MD;  Location:  GI    ESOPHAGOSCOPY, GASTROSCOPY, DUODENOSCOPY (EGD), DILATATION, COMBINED N/A 9/24/2022    Procedure: ESOPHAGOGASTRODUODENOSCOPY, WITH DILATION;  Surgeon: Kofi Davis MD;  Location:  GI    EYE SURGERY      right lid surgery     IR IVC FILTER PLACEMENT  5/24/2021    IR NEPHROSTOMY TUBE PLACEMENT RIGHT  3/9/2021    IR URETERAL STENT PLACEMENT RIGHT  3/16/2021    JOINT REPLACEMENT Right     HIP    KNEE SURGERY Bilateral     LAMINECTOMY LUMBAR ONE LEVEL      LASER HOLMIUM LITHOTRIPSY URETER(S), INSERT STENT, COMBINED Right 4/14/2021    Procedure: CYSTOSCOPY, BLADDER STONE REMOVAL, RIGHT URETEROSCOPY, HOLMIUM LASER LITHOTRIPSY, AND RIGHT STENT REMOVAL, RIGHT RETROGRADE;  Surgeon: Rob Sullivan MD;  Location:  OR    TONSILLECTOMY          Physical Exam   Patient Vitals for the past 24 hrs:   BP Temp Temp src Pulse Resp SpO2 Height Weight   04/28/24 0958 -- -- -- -- -- 94 % -- --   04/28/24 0955 (!) 161/91 98.9  F (37.2  C) Temporal 98 20 94 % 1.88 m (6' 2\") 99.8 kg (220 lb)        Physical Exam  Nursing note and vitals reviewed.  Constitutional:  Oriented to person, place, and time. Cooperative.  Cardona catheter in place.  Oxygen by nasal cannula in place.  HENT:   Nose:    Nose normal.   Mouth/Throat:   Mucous membranes are normal.   Eyes:    Conjunctivae normal and EOM are normal.      Pupils are equal, round, and reactive to light.   Neck:    Trachea normal.   Cardiovascular:  Normal rate, regular rhythm, normal heart sounds and normal pulses. No murmur heard.  Pulmonary/Chest:  Effort normal and breath sounds normal.   Abdominal:   Soft. Normal appearance and bowel sounds are normal.      There is no tenderness to palpation at this time.      There is no rebound and no CVA tenderness.   Musculoskeletal:  Extremities atraumatic x 4.   Lymphadenopathy:  No cervical adenopathy.   Neurological:   Alert and oriented to " person, place, and time.  Left hemiparesis present but otherwise normal strength.      No cranial nerve deficit or sensory deficit. GCS eye subscore is 4. GCS verbal subscore is 5. GCS motor subscore is 6.   Skin:    Skin is intact. No rash noted.   Psychiatric:   Normal mood and affect.      Emergency Department Course     Laboratory:  Labs Ordered and Resulted from Time of ED Arrival to Time of ED Departure   ROUTINE UA WITH MICROSCOPIC REFLEX TO CULTURE - Abnormal       Result Value    Color Urine Light Yellow      Appearance Urine Clear      Glucose Urine Negative      Bilirubin Urine Negative      Ketones Urine Negative      Specific Gravity Urine 1.012      Blood Urine Moderate (*)     pH Urine 7.0      Protein Albumin Urine Negative      Urobilinogen Urine Normal      Nitrite Urine Positive (*)     Leukocyte Esterase Urine Large (*)     Bacteria Urine Moderate (*)     RBC Urine 36 (*)     WBC Urine 42 (*)     Squamous Epithelials Urine <1     BASIC METABOLIC PANEL - Abnormal    Sodium 139      Potassium 4.2      Chloride 99      Carbon Dioxide (CO2) 28      Anion Gap 12      Urea Nitrogen 20.2      Creatinine 0.82      GFR Estimate 88      Calcium 9.1      Glucose 150 (*)    CBC WITH PLATELETS AND DIFFERENTIAL - Abnormal    WBC Count 9.8      RBC Count 4.18 (*)     Hemoglobin 12.7 (*)     Hematocrit 40.4      MCV 97      MCH 30.4      MCHC 31.4 (*)     RDW 14.9      Platelet Count 192      % Neutrophils 75      % Lymphocytes 12      % Monocytes 9      % Eosinophils 4      % Basophils 0      % Immature Granulocytes 0      NRBCs per 100 WBC 0      Absolute Neutrophils 7.4      Absolute Lymphocytes 1.2      Absolute Monocytes 0.9      Absolute Eosinophils 0.4      Absolute Basophils 0.0      Absolute Immature Granulocytes 0.0      Absolute NRBCs 0.0     URINE CULTURE        Procedures       Emergency Department Course & Assessments:    Interventions:  Medications   lidocaine (XYLOCAINE) 2 % external gel 10 mL  (10 mLs Urethral $Given 4/28/24 1003)   sulfamethoxazole-trimethoprim (BACTRIM DS) 800-160 MG per tablet 1 tablet (1 tablet Oral $Given 4/28/24 1146)          Independent Interpretation (X-rays, CTs, rhythm strip):  None    Consultations/Discussion of Management or Tests:  None        Social Determinants of Health affecting care:   None    Disposition:  The patient was discharged.     Impression & Plan    CMS Diagnoses: None         Medical Decision Making:  This is an 83-year-old male brought in by ambulance for what appeared to be a malfunctioning Cardona catheter.  The nurse replaced the Cardona catheter, and he had immediate relief.  He does not appear septic or toxic, but I felt it was reasonable to check his blood work to rule out any signs of renal impairment, and we also checked a UA.  His UA does show signs of a possible infection, although this certainly could be colonization as well.  However I am concerned that he could have an infection due to the fact that he had a recurrent obstruction.  We will be culturing the urine, and I will start him on oral Bactrim.  He understands that someone will get in touch with him or his nursing home if the urine culture grows bacteria requiring a change in management.  He should follow-up with his urologist and certainly return with any concerns or worsening symptoms.      Diagnosis:    ICD-10-CM    1. Malfunction of Cardona catheter, initial encounter (H24)  T83.011A       2. Acute UTI  N39.0            Discharge Medications:  Discharge Medication List as of 4/28/2024 11:58 AM        START taking these medications    Details   sulfamethoxazole-trimethoprim (BACTRIM DS) 800-160 MG tablet Take 1 tablet by mouth 2 times daily for 7 days, Disp-14 tablet, R-0, Local Print                  4/28/2024   Shawn Moya MD Lashkowitz, Seth H, MD  04/28/24 3569

## 2024-04-28 NOTE — ED TRIAGE NOTES
Pt comes from a care facility, has indwelling cath, pt states he has been having lower abd/penis pain since 2am, informed staff at 8am. Pt chirinos draining yellow urine with sediment, chirinos removed,      Triage Assessment (Adult)       Row Name 04/28/24 0957          Triage Assessment    Airway WDL WDL        Respiratory WDL    Respiratory WDL WDL        Cardiac WDL    Cardiac WDL WDL        Cognitive/Neuro/Behavioral WDL    Cognitive/Neuro/Behavioral WDL WDL

## 2024-04-28 NOTE — ED NOTES
Spoke with RN at [facility];  they are aware that pt is on the way back and of his discharge instructions.

## 2024-04-28 NOTE — ED NOTES
Bed: ED14  Expected date:   Expected time:   Means of arrival:   Comments:  Saira 3 here now, clogged catheter

## 2024-04-29 ENCOUNTER — TRANSFERRED RECORDS (OUTPATIENT)
Dept: HEALTH INFORMATION MANAGEMENT | Facility: CLINIC | Age: 84
End: 2024-04-29
Payer: MEDICARE

## 2024-04-30 LAB
BACTERIA UR CULT: ABNORMAL
BACTERIA UR CULT: ABNORMAL

## 2024-05-01 ENCOUNTER — TELEPHONE (OUTPATIENT)
Dept: NURSING | Facility: CLINIC | Age: 84
End: 2024-05-01
Payer: MEDICARE

## 2024-05-01 NOTE — TELEPHONE ENCOUNTER
[facility]    Reason for call: Lab Result Notification     Lab Result (including Rx patient on, if applicable).  If culture, copy of lab report at bottom.  Lab Result: See below    Prescribed sulfamethoxazole-trimethoprim (BACTRIM DS) 800-160 MG tablet BID x 7 days on 4/28/2024    Creatinine Level (mg/dl)   Creatinine   Date Value Ref Range Status   04/28/2024 0.82 0.67 - 1.17 mg/dL Final   06/30/2021 0.98 0.66 - 1.25 mg/dL Final    Creatinine clearance (ml/min), if applicable    Serum creatinine: 0.82 mg/dL 04/28/24 1028  Estimated creatinine clearance: 89.1 mL/min     Patient's current Symptoms:   Unable to assess. Left message. Letter sent.     RN Recommendations/Instructions per [facility] ED lab result protocol:   [facility] ED lab result protocol utilized: Urine culture    JEAN CLAUDE COPELAND RN

## 2024-05-15 ENCOUNTER — TRANSCRIBE ORDERS (OUTPATIENT)
Dept: OTHER | Age: 84
End: 2024-05-15

## 2024-05-15 DIAGNOSIS — C44.219 BCC (BASAL CELL CARCINOMA), EAR, LEFT: Primary | ICD-10-CM

## 2024-05-21 ENCOUNTER — TELEPHONE (OUTPATIENT)
Dept: DERMATOLOGY | Facility: CLINIC | Age: 84
End: 2024-05-21
Payer: MEDICARE

## 2024-06-19 ENCOUNTER — VIRTUAL VISIT (OUTPATIENT)
Dept: DERMATOLOGY | Facility: CLINIC | Age: 84
End: 2024-06-19
Payer: MEDICARE

## 2024-06-19 DIAGNOSIS — C44.219 BASAL CELL CARCINOMA (BCC) OF LEFT EAR: Primary | ICD-10-CM

## 2024-06-19 PROCEDURE — 99441 PR PHYSICIAN TELEPHONE EVALUATION 5-10 MIN: CPT | Mod: GC | Performed by: DERMATOLOGY

## 2024-06-19 ASSESSMENT — PAIN SCALES - GENERAL: PAINLEVEL: NO PAIN (0)

## 2024-06-19 NOTE — PROGRESS NOTES
[facility] Dermatology Note  Encounter Date: Jun 19, 2024  Store-and-Forward and Telephone. Location of teledermatologist: [facility] DERMATOLOGIC SURGERY CLINIC [place].  Start time: 1450. End time: 1459.    Dermatologic Surgery Telemedicine Consult Note    Dermatology Problem List:  nBCC, left posterior ear, s/p shave 4/29/24, MMS [place] 6/27/24 @ 830a [facility]    CC: Derm Problem (Consultation for Mohs of left posterior ear on the BCC)      Subjective: Ron Gilmore is a 83 year old male who presents today for Mohs micrographic surgery consultation for a recent diagnosis of skin cancer.  - Skin cancer(s): BCC  - Location(s): L Posterior Ear   - Referred by Dr. Gisell V[facility]quez [facility].   - requires a Terrie lift   - stepdaughter Maikol present on call  - lives in assisted living   - on chronic oxygen  - on ASA   - no other concerns today      Objective:   Skin: Focused examination of the left posterior ear within the teledermatology photograph(s) on 6/19/24 was performed.   - roughly 2.5 cm pearly pink eroded crusted plaque on left mid helical rim       Path report:   Case: [id number]  Collected: 4/29/24    Diagnosis:   Skin (Professional), ear, left, posterior  Basal Cell Carcinoma, nodular - See description    Assessment and Plan:     1. Plan for Mohs micrographic surgery for skin cancer(s) above:  *Review lab result(s): [facility]erm[facility] report   - We discussed the nature of the diagnosis/condition above. We discussed the treatment options, including the risks benefits and expectations of these options. We recommend micrographic surgery as the most effective and most tissue sparing option for treatment, and the patient agrees to proceed with this.  The patient is aware of the risks, benefits and expectations of this procedure. The patient will be scheduled for this procedure, if not already done so.  - We anticipate the following closure type: Sliding or lifting flap vs  KATELYN     The patient was discussed with and evaluated by attending physician, Cristi Hartman MD.    Keyla Maher MD  Micrographic Surgery and Dermatologic Oncology (MSDO) Fellow, PGY-5    Staff Involved:   Scribe/Fellow/Staff    Scribe Disclosure:   I, CARMENZA MARI, am serving as a scribe; to document services personally performed by Cristi Hartman MD -based on data collection and the provider's statements to me.     Scribe Disclosure:   I, Pamela Woo, am serving as a scribe; to document services personally performed by Cristi Hartman MD -based on data collection and the provider's statements to me.     Provider Disclosure:  I agree with above History, Review of Systems, Physical exam and Plan.  I have reviewed the content of the documentation and have edited it as needed. I have personally performed the services documented here and the documentation accurately represents those services and the decisions I have made.      Electronically signed by:  Attending Attestation  I attest that I discussed the case with the Fellow.  I agree with the plan.   I have reviewed the note and edited it as necessary.  I was physcially present with the fellow for the entire interview.  Cristi Hartman M.D.  Professor  Director of Dermatologic Surgery  Department of Dermatology  [facility]

## 2024-06-19 NOTE — NURSING NOTE
Chief Complaint   Patient presents with    Derm Problem     Consultation for Mohs of left posterior ear on the BCC     Jeanine Vallejo LPN

## 2024-06-19 NOTE — LETTER
6/19/2024       RE: Ron Gilmore  [address]  [place] [address]     Dear Colleague,    Thank you for referring your patient, Ron Gilmore, to the [facility] DERMATOLOGIC SUR[facility]ERY [facility]INIC [place] at [facility]. Please see a copy of my visit note below.    [facility] Dermatology Note  Encounter Date: Jun 19, 2024  Store-and-Forward and Telephone. Location of teledermatologist: [facility]MATOLOG[facility] [place].  Start time: 1450. End time: 1459.    Dermatologic Surgery Telemedicine Consult Note    Dermatology Problem List:  nBCC, left posterior ear, s/p shave 4/29/24, MMS scheduled 6/27/24 @ 830a [facility] [place]    CC: Derm Problem (Consultation for Mohs of left posterior ear on the BCC)      Subjective: Ron Gilmore is a 83 year old male who presents today for Mohs micrographic surgery consultation for a recent diagnosis of skin cancer.  - Skin cancer(s): BCC  - Location(s): L Posterior Ear   - Referred by Dr. Gisell V[facility]quez of [facility].   - requires a Terrie lift   - stepdaughter Maikol present on call  - lives in assisted living   - on chronic oxygen  - on ASA   - no other concerns today    Objective:   Skin: Focused examination of the left posterior ear within the teledermatology photograph(s) on 6/19/24 was performed.   - roughly 2.5 cm pearly pink eroded crusted plaque on left mid helical rim     Path report:   Case: [id number]  Collected: 4/29/24    Diagnosis:   Skin (Professional), ear, left, posterior  Basal Cell Carcinoma, nodular - See description    Assessment and Plan:     1. Plan for Mohs micrographic surgery for skin cancer(s) above:  *Review lab result(s): Dermpath report   - We discussed the nature of the diagnosis/condition above. We discussed the treatment options, including the risks benefits and expectations of these options. We recommend micrographic surgery as the most effective  and most tissue sparing option for treatment, and the patient agrees to proceed with this.  The patient is aware of the risks, benefits and expectations of this procedure. The patient will be scheduled for this procedure, if not already done so.  - We anticipate the following closure type: Sliding or lifting flap vs RAIF     The patient was discussed with and evaluated by attending physician, Cristi Hartman MD.    Keyla Maher MD  Micrographic Surgery and Dermatologic Oncology (MSDO) Fellow, PGY-5    Staff Involved:   Scribe/Fellow/Staff    Scribe Disclosure:   I, CARMENZA MARI, am serving as a scribe; to document services personally performed by Cristi Hartman MD -based on data collection and the provider's statements to me.     Scribe Disclosure:   I, Pamela Woo, am serving as a scribe; to document services personally performed by Cristi Hartman MD -based on data collection and the provider's statements to me.     Provider Disclosure:  I agree with above History, Review of Systems, Physical exam and Plan.  I have reviewed the content of the documentation and have edited it as needed. I have personally performed the services documented here and the documentation accurately represents those services and the decisions I have made.      Electronically signed by:  Attending Attestation  I attest that I discussed the case with the Fellow.  I agree with the plan.   I have reviewed the note and edited it as necessary.  I was physcially present with the fellow for the entire interview.  Cristi Hartman M.D.  Professor  Director of Dermatologic Surgery  Department of Dermatology  [facility]

## 2024-06-25 ENCOUNTER — HOSPITAL ENCOUNTER (EMERGENCY)
Facility: CLINIC | Age: 84
Discharge: HOME | End: 2024-06-25
Attending: EMERGENCY MEDICINE | Admitting: EMERGENCY MEDICINE
Payer: MEDICARE

## 2024-06-25 VITALS
DIASTOLIC BLOOD PRESSURE: 74 MMHG | OXYGEN SATURATION: 96 % | RESPIRATION RATE: 18 BRPM | HEART RATE: 90 BPM | BODY MASS INDEX: 30.48 KG/M2 | SYSTOLIC BLOOD PRESSURE: 130 MMHG | WEIGHT: 225 LBS | TEMPERATURE: 98.5 F | HEIGHT: 72 IN

## 2024-06-25 DIAGNOSIS — T83.9XXA COMPLICATION OF FOLEY CATHETER, INITIAL ENCOUNTER (H): ICD-10-CM

## 2024-06-25 DIAGNOSIS — R31.9 URINARY TRACT INFECTION WITH HEMATURIA, SITE UNSPECIFIED: ICD-10-CM

## 2024-06-25 DIAGNOSIS — N39.0 URINARY TRACT INFECTION WITH HEMATURIA, SITE UNSPECIFIED: ICD-10-CM

## 2024-06-25 LAB
ALBUMIN UR-MCNC: 100 MG/DL
APPEARANCE UR: ABNORMAL
BACTERIA #/AREA URNS HPF: ABNORMAL /HPF
BILIRUB UR QL STRIP: NEGATIVE
COLOR UR AUTO: ABNORMAL
GLUCOSE UR STRIP-MCNC: NEGATIVE MG/DL
HGB UR QL STRIP: ABNORMAL
KETONES UR STRIP-MCNC: NEGATIVE MG/DL
LEUKOCYTE ESTERASE UR QL STRIP: ABNORMAL
MUCOUS THREADS #/AREA URNS LPF: PRESENT /LPF
NITRATE UR QL: NEGATIVE
PH UR STRIP: 7 [PH] (ref 5–7)
RBC URINE: 147 /HPF
SP GR UR STRIP: 1.02 (ref 1–1.03)
UROBILINOGEN UR STRIP-MCNC: NORMAL MG/DL
WBC CLUMPS #/AREA URNS HPF: PRESENT /HPF
WBC URINE: >182 /HPF
YEAST #/AREA URNS HPF: ABNORMAL /HPF

## 2024-06-25 PROCEDURE — 51702 INSERT TEMP BLADDER CATH: CPT

## 2024-06-25 PROCEDURE — 81001 URINALYSIS AUTO W/SCOPE: CPT | Performed by: EMERGENCY MEDICINE

## 2024-06-25 PROCEDURE — 87086 URINE CULTURE/COLONY COUNT: CPT | Performed by: EMERGENCY MEDICINE

## 2024-06-25 PROCEDURE — 87186 SC STD MICRODIL/AGAR DIL: CPT | Performed by: EMERGENCY MEDICINE

## 2024-06-25 PROCEDURE — 250N000013 HC RX MED GY IP 250 OP 250 PS 637: Performed by: EMERGENCY MEDICINE

## 2024-06-25 PROCEDURE — 99283 EMERGENCY DEPT VISIT LOW MDM: CPT | Mod: 25

## 2024-06-25 RX ORDER — SULFAMETHOXAZOLE/TRIMETHOPRIM 800-160 MG
1 TABLET ORAL ONCE
Status: COMPLETED | OUTPATIENT
Start: 2024-06-25 | End: 2024-06-25

## 2024-06-25 RX ORDER — SULFAMETHOXAZOLE/TRIMETHOPRIM 800-160 MG
1 TABLET ORAL 2 TIMES DAILY
Qty: 14 TABLET | Refills: 0 | Status: SHIPPED | OUTPATIENT
Start: 2024-06-25 | End: 2024-07-02

## 2024-06-25 RX ADMIN — SULFAMETHOXAZOLE AND TRIMETHOPRIM 1 TABLET: 800; 160 TABLET ORAL at 14:24

## 2024-06-25 ASSESSMENT — ACTIVITIES OF DAILY LIVING (ADL)
ADLS_ACUITY_SCORE: 39

## 2024-06-25 NOTE — ED TRIAGE NOTES
Pt brought in by EMS from [facility] living for bladder and penis pain that started this morning 0930-10 am. Facility gave him tylenol and pain is getting much better

## 2024-06-25 NOTE — ED PROVIDER NOTES
Emergency Department Note      History of Present Illness     Chief Complaint   Urinary Frequency (UTI )    HPI   Ron Gilmore is a 83 year old male with a history of hypertension, hyperlipidemia, diabetes, stroke, and CVA who presents to the ER for bladder and penis pain that was alleviated with Tylenol.  Patient has had UTIs in the past.  He reports this feels like a urinary tract infection.  He reports being on an antibiotic for 10 days that he finished a few weeks ago.  He does not remember the name of the antibiotic.  Patient denies pain in his abdomin or sides as well as vomiting and diarrhea.     Independent Historian   None    Review of External Notes   I reviewed the note from 4/28/24 from Dr. Moya  Past Medical History   Medical History and Problem List   BPH   Cataract  Cholelithiasis  COPD   CVA  Chronic respiratory failure   Depression  Diabetes mellitus  Dyshidrotic foot dermatitis  DNR  Edema  Gout  Hyperlipidemia  Hypertension  Kidney stones  Lumbago  Lumbar disc displacement without myelopathy  Muscle weakness  Neuropathy  Obesity  Spinal stenosis  Stroke   UTI   Vasovagal episode  Former smoker     Medications   Allopurinol  Aspirin 81 mg  AMOX  Atorvastatin  Furosemide  Fluconazole   Methenamine   Metoprolol  Pantoprazole  Paroxetine  Senna-docusate  Simethicone    Surgical History   Appendectomy  Shoulder arthritis  Cataracts  Cholecystectomy  Colonoscopy  Cystoscopy  EP loop recorder implant  EGD  Eye surgery , right  Nephrostomy tube placement, right  Knee surgery  Hip replacement, right  Lumbar laminectomy, bilateral  Tonsillectomy  Physical Exam   Patient Vitals for the past 24 hrs:   BP Temp Temp src Pulse Resp SpO2 Height Weight   06/25/24 1341 -- -- -- -- -- 96 % -- --   06/25/24 1234 110/74 98.5  F (36.9  C) Oral 94 18 94 % 1.829 m (6') 102.1 kg (225 lb)     Physical Exam  General:  Sitting on bed, comfortable appearing.   HENT:  No obvious trauma to head  Right Ear:  External  ear normal.   Left Ear:  External ear normal.   Nose:  Nose normal.   Eyes:  Conjunctivae and EOM are normal.  Neck: Normal range of motion. Neck supple. No tracheal deviation present.   Pulm/Chest: No respiratory distress  Abdominal: No abdominal tenderness, guarding, rigidity or rebound tenderness.  Cardona catheter in place.  Circumcised male.  No rash, lesion, urine around the meatus or bleeding.  M/S: Normal range of motion.   Neuro: Alert. GCS 15.  Skin: Skin is warm and dry. No rash noted. Not diaphoretic.   Psych: Normal mood and affect. Behavior is normal.     Diagnostics   Lab Results   Labs Ordered and Resulted from Time of ED Arrival to Time of ED Departure   ROUTINE UA WITH MICROSCOPIC REFLEX TO CULTURE - Abnormal       Result Value    Color Urine Orange (*)     Appearance Urine Cloudy (*)     Glucose Urine Negative      Bilirubin Urine Negative      Ketones Urine Negative      Specific Gravity Urine 1.017      Blood Urine Moderate (*)     pH Urine 7.0      Protein Albumin Urine 100 (*)     Urobilinogen Urine Normal      Nitrite Urine Negative      Leukocyte Esterase Urine Large (*)     Bacteria Urine Few (*)     WBC Clumps Urine Present (*)     Budding Yeast Urine Few (*)     Mucus Urine Present (*)     RBC Urine 147 (*)     WBC Urine >182 (*)    URINE CULTURE       Imaging   No orders to display     Independent Interpretation   None  ED Course    Medications Administered   Medications   sulfamethoxazole-trimethoprim (BACTRIM DS) 800-160 MG per tablet 1 tablet (1 tablet Oral $Given 6/25/24 4129)       Discussion of Management   I consulted our clinical pharmacist.  He confirms the patient was recently on Keflex for 10 days, but there is no urine analysis available in the chart.  Most recent urine cultures have shown multiple bacteria that are sensitive to Bactrim.  He agrees that Bactrim would be a excellent choice of antibiotics for this patient.    Social Determinants of Health adding to complexity of  care   None    ED Course   ED Course as of 06/25/24 1438   Tue Jun 25, 2024   9994 I obtained the history and examined the patient as noted above.      Medical Decision Making / Diagnosis   CMS Diagnoses: None    MIPS       None    MDM   Ron Gilmore is a very pleasant 83 year old male who has symptoms consistent with a urinary tract infection.  He has a chronic Cardona catheter in.  The urine was cloudy within it.  He had some pain at the tip of the penis.  Is unclear if the catheter was adequately draining and it was last replaced over a month ago.  Catheter was replaced today.  Urine analysis obtained after this.  Urinalysis confirms the infection. There has been no fever, back/flank pain or significant abdominal pain. There is no clinical evidence of pyelonephritis, appendicitis, Colitis, diverticulitis or any intraabdominal catastrophe.  Prior urine cultures reviewed.  Bactrim seems like the most appropriate antibiotic after consulting our clinical pharmacist.  The patient will be started on antibiotics for the infection. Return if increasing pain, vomiting, fever, or inability to tolerate the oral antibiotic. Follow up with primary physician is indicated if not improving in 2-3 days.     The treatment plan was discussed with the patient and they expressed understanding of this plan and consented to the plan.  In addition, the patient will return to the emergency department if their symptoms persist, worsen, if new symptoms arise or if there is any concern as other pathology may be present that is not evident at this time. They also understand the importance of close follow up in the clinic and if unable to do so will return to the emergency department for a reevaluation. All questions were answered.    Disposition   The patient was discharged.     ICD-10 Codes:     ICD-10-CM    1. Complication of Cardona catheter, initial encounter (H24)  T83.9XXA       2. Urinary tract infection with hematuria, site unspecified   N39.0     R31.9            Discharge Medications   New Prescriptions    SULFAMETHOXAZOLE-TRIMETHOPRIM (BACTRIM DS) 800-160 MG TABLET    Take 1 tablet by mouth 2 times daily for 7 days         Scribe Disclosure:  I, Anatoliy Fish, am serving as a scribe at 12:43 PM on 6/25/2024 to document services personally performed by Ok Jurado DO based on my observations and the provider's statements to me.        Ok Jurado DO  06/25/24 4667

## 2024-06-26 NOTE — TELEPHONE ENCOUNTER
FUTURE VISIT INFORMATION      FUTURE VISIT INFORMATION:  Date: 6.27.24  Time: 8:30  Location: [facility]  REFERRAL INFORMATION:  Referring provider:  Esme  Referring providers clinic:  [facility]  Reason for visit/diagnosis  **needs aleks lift. Left posterior ear, BCC      RECORDS REQUESTED FROM:       Clinic name Comments Records Status Imaging Status   [facility] 4.29.24  Path # [id number] Received Received

## 2024-06-27 ENCOUNTER — HOSPITAL ENCOUNTER (EMERGENCY)
Facility: CLINIC | Age: 84
Discharge: HOME | End: 2024-06-28
Attending: EMERGENCY MEDICINE | Admitting: EMERGENCY MEDICINE
Payer: MEDICARE

## 2024-06-27 ENCOUNTER — PRE VISIT (OUTPATIENT)
Dept: DERMATOLOGY | Facility: CLINIC | Age: 84
End: 2024-06-27

## 2024-06-27 ENCOUNTER — OFFICE VISIT (OUTPATIENT)
Dept: DERMATOLOGY | Facility: CLINIC | Age: 84
End: 2024-06-27
Payer: MEDICARE

## 2024-06-27 ENCOUNTER — TELEPHONE (OUTPATIENT)
Dept: DERMATOLOGY | Facility: CLINIC | Age: 84
End: 2024-06-27

## 2024-06-27 VITALS — HEART RATE: 83 BPM | SYSTOLIC BLOOD PRESSURE: 122 MMHG | DIASTOLIC BLOOD PRESSURE: 78 MMHG

## 2024-06-27 DIAGNOSIS — C44.219 BCC (BASAL CELL CARCINOMA), EAR, LEFT: ICD-10-CM

## 2024-06-27 DIAGNOSIS — H95.41 POSTOPERATIVE HEMORRHAGE OF EAR FOLLOWING PROCEDURE ON EAR: ICD-10-CM

## 2024-06-27 LAB
HOLD SPECIMEN: NORMAL

## 2024-06-27 PROCEDURE — 250N000009 HC RX 250: Performed by: EMERGENCY MEDICINE

## 2024-06-27 PROCEDURE — 17311 MOHS 1 STAGE H/N/HF/G: CPT | Performed by: DERMATOLOGY

## 2024-06-27 PROCEDURE — 15275 SKIN SUB GRAFT FACE/NK/HF/G: CPT | Performed by: DERMATOLOGY

## 2024-06-27 PROCEDURE — 36415 COLL VENOUS BLD VENIPUNCTURE: CPT | Performed by: EMERGENCY MEDICINE

## 2024-06-27 PROCEDURE — 17312 MOHS ADDL STAGE: CPT | Performed by: DERMATOLOGY

## 2024-06-27 PROCEDURE — 99283 EMERGENCY DEPT VISIT LOW MDM: CPT

## 2024-06-27 RX ORDER — TRANEXAMIC ACID 100 MG/ML
INJECTION, SOLUTION INTRAVENOUS ONCE
Status: DISCONTINUED | OUTPATIENT
Start: 2024-06-27 | End: 2024-06-28 | Stop reason: HOSPADM

## 2024-06-27 RX ORDER — TRANEXAMIC ACID 100 MG/ML
INJECTION, SOLUTION INTRAVENOUS ONCE
Status: COMPLETED | OUTPATIENT
Start: 2024-06-27 | End: 2024-06-27

## 2024-06-27 RX ORDER — LIDOCAINE HYDROCHLORIDE AND EPINEPHRINE 10; 10 MG/ML; UG/ML
5 INJECTION, SOLUTION INFILTRATION; PERINEURAL ONCE
Status: DISCONTINUED | OUTPATIENT
Start: 2024-06-27 | End: 2024-06-28 | Stop reason: HOSPADM

## 2024-06-27 RX ORDER — TRANEXAMIC ACID 100 MG/ML
INJECTION, SOLUTION INTRAVENOUS ONCE
Status: DISCONTINUED | OUTPATIENT
Start: 2024-06-27 | End: 2024-06-27

## 2024-06-27 RX ADMIN — TRANEXAMIC ACID 1000 MG: 100 INJECTION INTRAVENOUS at 20:49

## 2024-06-27 ASSESSMENT — ACTIVITIES OF DAILY LIVING (ADL)
ADLS_ACUITY_SCORE: 39

## 2024-06-27 ASSESSMENT — PAIN SCALES - GENERAL: PAINLEVEL: NO PAIN (0)

## 2024-06-27 NOTE — TELEPHONE ENCOUNTER
Called and scheduled with López. She will send photos via [employer].     Prachi Mar, Complex  6/27/2024 2:01 PM

## 2024-06-27 NOTE — PATIENT INSTRUCTIONS
Purocol collagen foam placed in wound. It will dissolve on its own with time. Sometimes it can turn a yellow-carlos color, this is normal. Needs to be covered with a copious amount of vaseline to heal. Clean and change bandage daily starting in 2 days post surgery      Post-Operative Care for Granulating Site  After your surgery, a pressure bandage will be placed over the area. This will help prevent bleeding. Please follow these instructions as they will help you to prevent complications as your wound heals.  For the First 48 hours After Surgery:  Leave the pressure bandage on and keep it dry. If it should come loose, you may retape it, but do not take it off.  Relax and take it easy. Do not do any vigorous exercise, heavy lifting, or bending forward. This could cause the wound to bleed.  Post-operative pain is usually mild. You may alternate between 1000 mg of Tylenol (acetaminophen) and 400 mg of Ibuprofen every 4 hours.  Do not take more than 4,000mg of acetaminophen in a 24 hour period or 3200 mg of Ibuprofen in a 24 hr period.  Avoid alcohol and vitamin E as these may increase your tendency to bleed.  You may put an ice pack around the bandaged area for 20 minutes every 2-3 hours. This may help reduce swelling, bruising, and pain. Make sure the ice pack is waterproof so that the pressure bandage does not get wet.   You may see a small amount of drainage or blood on your pressure bandage. This is normal. However, if drainage or bleeding continues or saturates the bandage, you will need to apply firm pressure over the bandage with a washcloth for 15 minutes. If bleeding continues after applying pressure for 15 minutes, apply an ice pack to the bandaged area for 15 minutes. If bleeding still continues, go to the nearest emergency room.  48 Hours After Surgery:  Carefully remove the bandage and start daily wound care and dressing changes. You may also now shower and get the wound wet. Use caution when washing the  "wound, be gentle.  Do not use a wash cloth on the wound.  Daily Wound Care:  Wash with mild soap and water every day until wound appears well-healed.  Rinse well and pat dry.  Apply Vaseline over site with a Q-tip and re-apply a dressing until wound appears well healed.  A well healed wound is \"pinked over\" with a shiny surface.  When area is \"pinked over\" it is no longer necessary to apply Vaseline.  Call Us If:  You have pain that is not controlled with Tylenol.  You have signs or symptoms of an infection, such as: fever over 100 degrees F, redness, warmth, or foul-smelling or yellow/creamy drainage from the wound.  Who should I call with questions?  [facility]: [phone]  [facility]: [phone]  For urgent needs outside of business hours call the [facility] at [phone] and ask for the dermatology resident on call   "

## 2024-06-27 NOTE — TELEPHONE ENCOUNTER
Follow up call completed following Mohs procedure with Dr. Hartman. Spoke with family member. Patient had post op bleeding addressed in ED last night      Are you having pain?   Are you taking pain medication?   Are you applying ice?    Have you had any noticeable bleeding through the bandage? YES, managed in ED   Do you have any other concerns? no       Please call [phone] if you have any questions or concerns.

## 2024-06-27 NOTE — LETTER
6/27/2024       RE: Ron Gilmore  [address]  [facility] [address]     Dear Colleague,    Thank you for referring your patient, Ron Gilmore, to the [facility] DERMATOLOGIC SURGERY CLINIC [place] at [facility]. Please see a copy of my visit note below.      [facility] Dermatologic Surgery Clinic [place] Procedure Note    Date of Service:  Jun 27, 2024  Surgery: Mohs micrographic surgery    Case 1  Repair Type: secondary with puracol  Repair Size: 6x3 cm  Suture Material: n/a  Tumor Type: BCC - Basal cell carcinoma  Location: L posterior ear  Derm-Path Accession #: [id number]  PreOp Size: 4.5x2 cm  PostOp Size: 6x2 cm  Mohs Accession #:   Level of Defect: cartilage      Procedure:  We discussed the principles of treatment and most likely complications including scarring, bleeding, infection, swelling, pain, crusting, nerve damage, large wound,  incomplete excision, wound dehiscence,  nerve damage, recurrence, and a second procedure may be recommended to obtain the best cosmetic or functional result.    Informed consent was obtained and the patient underwent the procedure as follows:  The patient was placed supine on the operating table.  The cancer was identified, outlined with a marker, and verified by the patient.  The entire surgical field was prepped with Hibiclens.  The surgical site was anesthetized using Lidocaine 1% with epi 1:100,000.      The area of clinically apparent tumor was debulked. The layer of tissue was then surgically excised using a #15 blade and was then transferred onto a specimen sheet maintaining the orientation of the specimen. Hemostasis was obtained using electrofulguration. The wound site was then covered with a dressing while the tissue samples were processed for examination.    The excised tissue was transported to the Mohs histology laboratory maintaining the tissue orientation.  The tissue specimen  was relaxed so that the entire surgical margin was in a a single horizontal plane for sectioning and inked for precise mapping.  A precise reference map was drawn to reflect the sectioning of the specimen, colored inking of the margins, and orientation on the patient.  The tissue was processed using horizontal sectioning of the base and continuous peripheral margins.  The histopathologic sections were reviewed in conjunction with the reference map.    Total blocks: 2    Total slides:  6    Residual tumor was identified and indicated in red on the reference map, identifying the location where further tissue excision was necessary. Therefore, an additional stage of Mohs Micrographic surgery was deemed necessary.     Stage II   The patient was returned to the operating room, and the area prepped in the usual manner. The residual tumor was excised using the reference map as a guide. The specimen was transfered to a labeled specimen sheet maintaining the orientation of the specimen. Hemostasis was obtained and the wound site was covered with a dressing while the tissue was processed for examination.     The excised tissue was transported to the Mohs histology laboratory maintaining orientation. The specimen margins were inked for precise mapping and a reference map was prepared for the is additional stage to maintain precise orientation as described above. The tissue was processed using horizontal sectioning of the base and continuous peripheral margins. The histopathologic sections were reviewed in conjunction with the reference map.     Total blocks: 1    Total slides:  4    There were no cancer cells visualized on examination, therefore Mohs surgery was complete.      EVALUATION OF MOHS DEFECT FOR RECONSTRUCTION    The patient is status post Mohs' micrographic surgery.  The surgical site was examined with attention to normal anatomic and functional relationships.  After consideration and discussion of multiple options  with the patient, it was determined that healing by second intention would offer the best chance for preservation/restoration of all normal anatomic and functional relationships.  The patient verbalized understanding and agrees with this plan. It is also understood that should second intention healing be sub-optimal, additional procedures such as scar revision, steroid injection or dermabrasion may be recommended.    The open wound was cleaned with Betadine, and a thick layer of ointment was applied.  A pressure dressing consisting of non-adherent gauze, gauze and hypafixwas applied.   Wound care was discussed with patient both orally and in writing.  The patient stated understanding and agreement of the course of care.    Attending attestation:  I personally performed the entire procedure.  I have reviewed the note and edited it as necessary, and agree with its contents.    Cristi Hartman M.D.  Professor  Director of Dermatologic Surgery  Department of Dermatology  [facility]    Dermatology Surgery Clinic  [facility] [place] [id number]

## 2024-06-27 NOTE — PROGRESS NOTES
[facility] Dermatologic Surgery Clinic [place] Procedure Note    Date of Service:  Jun 27, 2024  Surgery: Mohs micrographic surgery    Case 1  Repair Type: secondary with puracol  Repair Size: 6x3 cm  Suture Material: n/a  Tumor Type: BCC - Basal cell carcinoma  Location: L posterior ear  Derm-Path Accession #: [id number]  PreOp Size: 4.5x2 cm  PostOp Size: 6x2 cm  Mohs Accession #:   Level of Defect: cartilage      Procedure:  We discussed the principles of treatment and most likely complications including scarring, bleeding, infection, swelling, pain, crusting, nerve damage, large wound,  incomplete excision, wound dehiscence,  nerve damage, recurrence, and a second procedure may be recommended to obtain the best cosmetic or functional result.    Informed consent was obtained and the patient underwent the procedure as follows:  The patient was placed supine on the operating table.  The cancer was identified, outlined with a marker, and verified by the patient.  The entire surgical field was prepped with Hibiclens.  The surgical site was anesthetized using Lidocaine 1% with epi 1:100,000.      The area of clinically apparent tumor was debulked. The layer of tissue was then surgically excised using a #15 blade and was then transferred onto a specimen sheet maintaining the orientation of the specimen. Hemostasis was obtained using electrofulguration. The wound site was then covered with a dressing while the tissue samples were processed for examination.    The excised tissue was transported to the Mohs histology laboratory maintaining the tissue orientation.  The tissue specimen was relaxed so that the entire surgical margin was in a a single horizontal plane for sectioning and inked for precise mapping.  A precise reference map was drawn to reflect the sectioning of the specimen, colored inking of the margins, and orientation on the patient.  The tissue was processed using horizontal sectioning  of the base and continuous peripheral margins.  The histopathologic sections were reviewed in conjunction with the reference map.    Total blocks: 2    Total slides:  6    Residual tumor was identified and indicated in red on the reference map, identifying the location where further tissue excision was necessary. Therefore, an additional stage of Mohs Micrographic surgery was deemed necessary.     Stage II   The patient was returned to the operating room, and the area prepped in the usual manner. The residual tumor was excised using the reference map as a guide. The specimen was transfered to a labeled specimen sheet maintaining the orientation of the specimen. Hemostasis was obtained and the wound site was covered with a dressing while the tissue was processed for examination.     The excised tissue was transported to the Mohs histology laboratory maintaining orientation. The specimen margins were inked for precise mapping and a reference map was prepared for the is additional stage to maintain precise orientation as described above. The tissue was processed using horizontal sectioning of the base and continuous peripheral margins. The histopathologic sections were reviewed in conjunction with the reference map.     Total blocks: 1    Total slides:  4    There were no cancer cells visualized on examination, therefore Mohs surgery was complete.      EVALUATION OF MOHS DEFECT FOR RECONSTRUCTION    The patient is status post Mohs' micrographic surgery.  The surgical site was examined with attention to normal anatomic and functional relationships.  After consideration and discussion of multiple options with the patient, it was determined that healing by second intention would offer the best chance for preservation/restoration of all normal anatomic and functional relationships.  The patient verbalized understanding and agrees with this plan. It is also understood that should second intention healing be sub-optimal,  additional procedures such as scar revision, steroid injection or dermabrasion may be recommended.    The open wound was cleaned with Betadine, and a thick layer of ointment was applied.  A pressure dressing consisting of non-adherent gauze, gauze and hypafixwas applied.   Wound care was discussed with patient both orally and in writing.  The patient stated understanding and agreement of the course of care.    Attending attestation:  I personally performed the entire procedure.  I have reviewed the note and edited it as necessary, and agree with its contents.    Cristi Hartman M.D.  Professor  Director of Dermatologic Surgery  Department of Dermatology  [facility]    Dermatology Surgery Clinic  [facility]  [address] [place], [place] [id number]         Severe sepsis Hyperlipidemia

## 2024-06-28 VITALS
OXYGEN SATURATION: 92 % | TEMPERATURE: 98.7 F | DIASTOLIC BLOOD PRESSURE: 85 MMHG | RESPIRATION RATE: 24 BRPM | HEART RATE: 86 BPM | SYSTOLIC BLOOD PRESSURE: 128 MMHG

## 2024-06-28 ASSESSMENT — ACTIVITIES OF DAILY LIVING (ADL)
ADLS_ACUITY_SCORE: 39

## 2024-06-28 NOTE — ED TRIAGE NOTES
"[facility]  ED Arrival Note      Means of Arrival: EMS  Comes from: Nursing Home    Story: Pt brought in by EMS for left ear bleeding. Pt had surgery to remove \"Cancer\" . Pt takes blood thinners.         EMS/PD Interventions: N/A  EMS Medications: N/A    Meets Stroke Criteria? No  Meets Trauma Criteria? No  Shock Index (HR/SBP): N/A      Directed to: Main ED  Belongings: Remain with patient              "

## 2024-06-28 NOTE — ED PROVIDER NOTES
Emergency Department Note      History of Present Illness     Chief Complaint   Laceration      HPI   Ron Gilmore is a 83 year old male who presents to the emergency department for post-op bleeding. The patient reports having a Mohs procedure done at the [facility] earlier today. He notes that around 1700 today his ear started to bleed profusely. He denies any pain, dizziness, or lightheadedness.       Independent Historian   None    Review of External Notes   I reviewed an unfinished note for the patient's procedure 6/27/24    Past Medical History     Medical History and Problem List   BPH   Cataract   Cholelithiasis   COPD   Depression   Type 2 diabetes   Dyshidrotic foot dermatitis   Gout   Hyperlipidemia   Hypertension   Kidney stones   Lumbago   LDD   Muscle weakness   Neuropathy   Obesity   Spinal stenosis   Stroke   Urinary retention   UTI       Medications   Zyloprim   Aspirin 81 mg   Lipitor   Amlactin   Performomist   Lasix   Imodium   Hiprex   Lopressor   Protonix   Paxil   Senokot   Mylicon   Bactrim   Augmentin   Dulcolax   Keflex  Vibramycin   Diflucan     Surgical History   Appendectomy open   Arthroscopy shoulder rotator cuff repair   Cataracts   Cholecystectomy   Colonoscopy x2  Cystoscopy x3   Ep loop recorder implant   EGD combined x 2   Eye surgery   IVC filter placement   Nephrostomy tube placement right   Ureteral stent placement right   Joint replacement   Knee surgery   Laminectomy lumbar one level   Laser holmium lithotripsy ureters insert stent combined   Tonsillectomy    Physical Exam     Patient Vitals for the past 24 hrs:   BP Temp Temp src Pulse Resp SpO2   06/28/24 0000 121/76 -- -- 86 20 93 %   06/27/24 2300 131/72 -- -- 90 17 91 %   06/27/24 2125 -- -- -- 93 19 90 %   06/27/24 2100 130/78 -- -- 96 26 94 %   06/27/24 2017 -- 98.7  F (37.1  C) Temporal -- -- --   06/27/24 2016 -- -- -- -- 21 --   06/27/24 2012 114/76 -- -- 98 -- 90 %     Physical Exam  General: No  acute distress.  Head: Brisk bleeding from the posterior left ear. Significant coagulated blood on the lower face and neck.  Eyes:  Conjunctivae clear.   Neck: Normal range of motion.   CV: Skin is well perfused, no cyanosis  Respiratory: Effort normal. No audible wheezing.  Gastrointestinal: Non-distended.  Musculoskeletal: Normal range of motion. No gross deformities.  Neuro: Alert. Moving all extremities appropriately.  Normal speech.    Skin: No rashes or lesions on exposed skin.      Diagnostics     Lab Results   Labs Ordered and Resulted from Time of ED Arrival to Time of ED Departure - No data to display    Imaging   No orders to display       Independent Interpretation   None    ED Course      Medications Administered   Medications   lidocaine 1% with EPINEPHrine 1:100,000 injection 5 mL (has no administration in time range)   tranexamic acid (CYKLOKAPRON) TOPICAL (injection used topically) (has no administration in time range)   tranexamic acid (CYKLOKAPRON) bolus 1 g vial attach to NaCl 50 or 100 mL bag ADULT (has no administration in time range)   tranexamic acid (CYKLOKAPRON) TOPICAL (injection used topically) (1,000 mg Topical $Given 6/27/24 2049)       Procedures   Procedures     Discussion of Management   Dermatology, Dr. Matthews    Social Determinants of Health adding to complexity of care   None    ED Course   ED Course as of 06/28/24 0115   Thu Jun 27, 2024 2016 Initial evaluation and assessment of patient   2040 I reevaluated the patient   2156 I reevaluated the patient   Fri Jun 28, 2024   0042 I reevaluated the patient       Medical Decision Making / Diagnosis     CMS Diagnoses: None    MIPS       None    [facility]   Ron Gilmore is a 83 year old male presenting with significant bleeding from the left ear after a Mohs procedure.  Coagulated blood was removed from the area, and a pressure dressing is placed.  Upon reevaluation, the bleeding is continuing.  TXA soaked gauze is put in place with a  pressure dressing.  This is held in place for 50 minutes.  Upon reevaluation, it is continuing to bleed.  I discussed the patient with dermatology.  He recommends injecting lidocaine with epi into the ear, and then injecting TXA into the air, followed by TXA soaked gauze with a pressure dressing.  I cathetered my supplies and an attempt to inject the ear, but on reevaluation the bleeding has stopped.  We placed TXA soaked gauze over the ear again for approximately 30 minutes, and the bleeding continues to be stopped.  The patient is cleaned up and a clean pressure dressing is placed over the left ear.  He remains in stable condition and has no further complaints.  Return precautions are given and he verbalizes understanding.  He is discharged home in stable condition.    Disposition   The patient was discharged.     Diagnosis     ICD-10-CM    1. Postoperative hemorrhage of ear following procedure on ear  H95.41            Discharge Medications   New Prescriptions    No medications on file         Scribe Disclosure:  I, Marisol Love, am serving as a scribe at 8:43 PM on 6/27/2024 to document services personally performed by Oral Estrada MD based on my observations and the provider's statements to me.        Oral Estrada MD  06/28/24 0135

## 2024-06-28 NOTE — DISCHARGE INSTRUCTIONS
Keep your ear clean and dry.  Follow the postoperative instructions given to you by the dermatologist.  Please return the emergency department if you develop any new or concerning symptoms.

## 2024-06-28 NOTE — ED NOTES
Bed: ED01  Expected date:   Expected time:   Means of arrival:   Comments:  Saria 2 83M ear lac, bleeding not controlled eta 1957

## 2024-06-29 ENCOUNTER — TELEPHONE (OUTPATIENT)
Dept: NURSING | Facility: CLINIC | Age: 84
End: 2024-06-29
Payer: MEDICARE

## 2024-06-29 LAB
BACTERIA UR CULT: ABNORMAL

## 2024-07-15 ENCOUNTER — VIRTUAL VISIT (OUTPATIENT)
Dept: DERMATOLOGY | Facility: CLINIC | Age: 84
End: 2024-07-15
Payer: MEDICARE

## 2024-07-15 DIAGNOSIS — Z48.89 ENCOUNTER FOR POSTOPERATIVE WOUND CHECK: ICD-10-CM

## 2024-07-15 DIAGNOSIS — Z85.828 HISTORY OF BASAL CELL CARCINOMA (BCC): Primary | ICD-10-CM

## 2024-07-15 PROCEDURE — 99207 PR NO BILLABLE SERVICE THIS VISIT: CPT | Mod: 93 | Performed by: DERMATOLOGY

## 2024-07-15 NOTE — LETTER
7/15/2024       RE: Ron Gilmore  [address]  [facility] [address]     Dear Colleague,    Thank you for referring your patient, Ron Gilmore, to the [facility] DERMATOLOGIC SURGERY CLINIC [place] at [facility]. Please see a copy of my visit note below.    [facility] Dermatology Note  Encounter Date: Jul 15, 2024  Store-and-Forward and Telephone. Location of teledermatologist: [facility] DERMATOLOGIC SURGERY CLINIC [place].  Start time: 3:47 pm. End time: 3:50 pm .    Dermatology Problem List:  1. nBCC, left posterior ear, s/p shave 4/29/24, s/p MMS on 6/27/24       CC: RECHECK (Wound check ear, having trouble keeping bandage on but otherwise doing well)      Subjective: Ron Gilmore is a 83 year old male who presents today for wound check after 6/27/24 MMS of nBCC of left posterior ear.   - reports tenderness in the area  - no other concerns today    Objective:   Focused examination of the left ear within the teledermatology photograph(s) on 7/15/24 was performed.   - no redness, oozing  - The surgical site noted above is clean, dry, and intact. There is no surrounding erythema or purulence. No clinical evidence of infection noted today.                Assessment and Plan:     1. nBCC, left posterior ear, s/p shave 4/29/24, s/p MMS on 6/27/24   - The patient's surgery site(s) is/are healing very well. No evidence of infection on examination today.  - The patient was told to continue with wound cares until the area(s) is/are no longer crusted.   - The patient should follow up with dermatologic surgery in 3 months, as well as continue with regular skin exams in general dermatology clinic.    Patient was discussed with and evaluated by attending physician Dr. Hartman.      Staff Involved:   Scribe/Fellow/Staff     Scribe Disclosure:   MAICOL LAUREN, am serving as a scribe; to document services personally performed by  Cristi Hartman MD -based on data collection and the provider's statements to me.      Attending Attestation  I attest that the Scribe recorded the interview and exam that I personally performed.  I have reviewed the note and edited it as necessary.    Cristi Hartman M.D.  Professor  Director of Dermatologic Surgery  Department of Dermatology  [facility]        Again, thank you for allowing me to participate in the care of your patient.      Sincerely,    Cristi Hartman MD

## 2024-07-15 NOTE — PROGRESS NOTES
[facility] Dermatology Note  Encounter Date: Jul 15, 2024  Store-and-Forward and Telephone. Location of teledermatologist: [facility] DERMATOLOGIC SURGERY CLINIC [place].  Start time: 3:47 pm. End time: 3:50 pm .    Dermatology Problem List:  1. nBCC, left posterior ear, s/p shave 4/29/24, s/p MMS on 6/27/24       CC: RECHECK (Wound check ear, having trouble keeping bandage on but otherwise doing well)      Subjective: Ron Gilmore is a 83 year old male who presents today for wound check after 6/27/24 MMS of nBCC of left posterior ear.   - reports tenderness in the area  - no other concerns today    Objective:   Focused examination of the left ear within the teledermatology photograph(s) on 7/15/24 was performed.   - no redness, oozing  - The surgical site noted above is clean, dry, and intact. There is no surrounding erythema or purulence. No clinical evidence of infection noted today.                Assessment and Plan:     1. nBCC, left posterior ear, s/p shave 4/29/24, s/p MMS on 6/27/24   - The patient's surgery site(s) is/are healing very well. No evidence of infection on examination today.  - The patient was told to continue with wound cares until the area(s) is/are no longer crusted.   - The patient should follow up with dermatologic surgery in 3 months, as well as continue with regular skin exams in general dermatology clinic.    Patient was discussed with and evaluated by attending physician Dr. Hartman.      Staff Involved:   Scribe/Fellow/Staff     Scribe Disclosure:   I, MAICOL BELL, am serving as a scribe; to document services personally performed by Cristi Hartman MD -based on data collection and the provider's statements to me.      Attending Attestation  I attest that the Scribe recorded the interview and exam that I personally performed.  I have reviewed the note and edited it as necessary.    Cristi Hartman M.D.  Professor  Director of Dermatologic Surgery  Department of  Dermatology  [facility]

## 2024-07-15 NOTE — NURSING NOTE
Chief Complaint   Patient presents with    RECHECK     Wound check ear, having trouble keeping bandage on but otherwise doing well     Isis DE OLIVEIRA, RN-BSN  Dermatology Surgery  [phone]

## 2024-07-29 ENCOUNTER — HOSPITAL ENCOUNTER (EMERGENCY)
Facility: CLINIC | Age: 84
Discharge: HOME | End: 2024-07-29
Attending: EMERGENCY MEDICINE | Admitting: EMERGENCY MEDICINE
Payer: MEDICARE

## 2024-07-29 VITALS
SYSTOLIC BLOOD PRESSURE: 125 MMHG | TEMPERATURE: 98.1 F | HEART RATE: 78 BPM | DIASTOLIC BLOOD PRESSURE: 76 MMHG | RESPIRATION RATE: 18 BRPM | OXYGEN SATURATION: 90 %

## 2024-07-29 DIAGNOSIS — Z97.8 CHRONIC INDWELLING FOLEY CATHETER: ICD-10-CM

## 2024-07-29 DIAGNOSIS — T83.9XXA FOLEY CATHETER PROBLEM, INITIAL ENCOUNTER (H): Primary | ICD-10-CM

## 2024-07-29 LAB
ALBUMIN UR-MCNC: NEGATIVE MG/DL
APPEARANCE UR: CLEAR
BILIRUB UR QL STRIP: NEGATIVE
COLOR UR AUTO: ABNORMAL
GLUCOSE UR STRIP-MCNC: NEGATIVE MG/DL
HGB UR QL STRIP: ABNORMAL
KETONES UR STRIP-MCNC: NEGATIVE MG/DL
LEUKOCYTE ESTERASE UR QL STRIP: ABNORMAL
NITRATE UR QL: NEGATIVE
PH UR STRIP: 6.5 [PH] (ref 5–7)
RBC URINE: 10 /HPF
SP GR UR STRIP: 1.01 (ref 1–1.03)
UROBILINOGEN UR STRIP-MCNC: NORMAL MG/DL
WBC URINE: 19 /HPF

## 2024-07-29 PROCEDURE — 87086 URINE CULTURE/COLONY COUNT: CPT | Performed by: EMERGENCY MEDICINE

## 2024-07-29 PROCEDURE — 81001 URINALYSIS AUTO W/SCOPE: CPT | Performed by: EMERGENCY MEDICINE

## 2024-07-29 PROCEDURE — 99283 EMERGENCY DEPT VISIT LOW MDM: CPT

## 2024-07-29 PROCEDURE — 87186 SC STD MICRODIL/AGAR DIL: CPT | Mod: 59 | Performed by: EMERGENCY MEDICINE

## 2024-07-29 PROCEDURE — 51702 INSERT TEMP BLADDER CATH: CPT

## 2024-07-29 RX ORDER — SULFAMETHOXAZOLE/TRIMETHOPRIM 800-160 MG
1 TABLET ORAL ONCE
Status: DISCONTINUED | OUTPATIENT
Start: 2024-07-29 | End: 2024-07-29

## 2024-07-29 RX ORDER — SULFAMETHOXAZOLE/TRIMETHOPRIM 800-160 MG
1 TABLET ORAL 2 TIMES DAILY
Qty: 14 TABLET | Refills: 0 | Status: SHIPPED | OUTPATIENT
Start: 2024-07-29 | End: 2024-07-29

## 2024-07-29 ASSESSMENT — COLUMBIA-SUICIDE SEVERITY RATING SCALE - C-SSRS
6. HAVE YOU EVER DONE ANYTHING, STARTED TO DO ANYTHING, OR PREPARED TO DO ANYTHING TO END YOUR LIFE?: NO
3. HAVE YOU BEEN THINKING ABOUT HOW YOU MIGHT KILL YOURSELF?: NO
2. HAVE YOU ACTUALLY HAD ANY THOUGHTS OF KILLING YOURSELF IN THE PAST MONTH?: YES
5. HAVE YOU STARTED TO WORK OUT OR WORKED OUT THE DETAILS OF HOW TO KILL YOURSELF? DO YOU INTEND TO CARRY OUT THIS PLAN?: NO
4. HAVE YOU HAD THESE THOUGHTS AND HAD SOME INTENTION OF ACTING ON THEM?: NO
1. IN THE PAST MONTH, HAVE YOU WISHED YOU WERE DEAD OR WISHED YOU COULD GO TO SLEEP AND NOT WAKE UP?: YES

## 2024-07-29 ASSESSMENT — ACTIVITIES OF DAILY LIVING (ADL)
ADLS_ACUITY_SCORE: 39

## 2024-07-29 NOTE — ED PROVIDER NOTES
Emergency Department Note      History of Present Illness   Chief Complaint   Catheter Problem    HPI   Ron Gilmore is a 83 year old male with a history of urinary retention who presents for evaluation of a catheter problem. Patient lives at a facility where staff was attempting to flush and replace his current catheter but after taking it out were unable to place a new one in. Patient is on chronic oxygen at baseline. He denies experiencing any fever, chest pain, abdominal pain, or shortness of breath.     Independent Historian   None    Review of External Notes   I reviewed the ED visit notes from 4/28 and 6/25 when patient was experiencing chirinos catheter malfunctions. His catheter was replaced both times and his urine showed an infection before he was discharged.    Past Medical History     Medical History and Problem List   Benign prostatic hyperplasia   Vasovagal episode  UTI  Dyshidrotic foot dermatitis   Urinary retention  Stroke  Spinal stenosis  Muscle weakness  Lumbago  Kidney stones  Obesity  Diabetic neuropathy  Muscle weakness  Hypertension  Hyperlipidemia  Gout  Diabetes  Depression  Cataract  Cholelithiasis  COPD       Medications   Zyloprim  Aspirin 81mg  Lipitor  Emollient  Perforomist  Lasix  Duoneb  Imodium  Hiprex  Lopressor  Protonix  Paxil  Senokot  Mylicon      Surgical History   Open appendectomy  Tonsillectomy  Arthroscopy shoulder rotator cuff repair  Bilateral cataract surgery  Cholecystectomy  Colonoscopy x2  Cystoscopy  Cystoscopy, transurethral resection prostate, combined  EP loop recorder implant  Esophagogastroduodenoscopy  Esophagogastroduodenoscopy, dilatation, combined  Right eyelid surgery  IVC filter replacement  Nephrostomy tube replacement right  Uretal stent placement right  Hip joint replacement  Knee surgery  Laminectomy lumbar one level  Laser holmium lithotripsy ureter, insert stent, combined      Physical Exam     Patient Vitals for the past 24 hrs:   BP Temp Temp src  Pulse Resp SpO2   07/29/24 1201 125/76 98.1  F (36.7  C) Oral 78 18 90 %     Physical Exam  General: Alert, appears elderly. Cooperative.  HEENT:  Head:  Atraumatic  Ears:  External ears are normal  Mouth/Throat:  Oropharynx is without erythema or exudate and mucous membranes are dry.   Eyes:   Conjunctivae normal and EOM are normal. No scleral icterus.  CV:  Normal rate, regular rhythm, normal heart sounds and radial pulses are 2+ and symmetric.  No murmur.  Resp:  Breath sounds are clear bilaterally, on chronic supplemental O2 by NC.      Non-labored, no retractions or accessory muscle use  GI:  Abdomen is soft, no distension, no tenderness. No rebound or guarding.  No CVA tenderness bilaterally  :  Normal age appropriate genitalia.  No chirinos catheter present.  MS:  Normal range of motion. No edema.    Back atraumatic.    No midline cervical, thoracic, or lumbar tenderness  Skin:  Warm and dry.  No rash or lesions noted.  Neuro:   Alert. Normal strength.  GCS: 15  Psych: Normal mood and affect.    Diagnostics     Lab Results   Labs Ordered and Resulted from Time of ED Arrival to Time of ED Departure   ROUTINE UA WITH MICROSCOPIC REFLEX TO CULTURE - Abnormal       Result Value    Color Urine Light Yellow      Appearance Urine Clear      Glucose Urine Negative      Bilirubin Urine Negative      Ketones Urine Negative      Specific Gravity Urine 1.015      Blood Urine Trace (*)     pH Urine 6.5      Protein Albumin Urine Negative      Urobilinogen Urine Normal      Nitrite Urine Negative      Leukocyte Esterase Urine Large (*)     RBC Urine 10 (*)     WBC Urine 19 (*)    URINE CULTURE       Imaging   No orders to display     ED Course      Medications Administered   Medications - No data to display    Procedures   Procedures     Discussion of Management   None    ED Course   ED Course as of 07/29/24 1343   Mon Jul 29, 2024   1204 I obtained patient history and performed a physical exam.        Optional/Additional  Documentation  None    Medical Decision Making / Diagnosis     CMS Diagnoses: None    MIPS       None    [facility]   Ron Gilmore is a 83 year old male who presents after a catheter issue at his facility.  Patient was unable to get a Chirinos catheter replaced given his chronic indwelling Chirinos catheter status.  His prior Chirinos catheter was removed and he presents today for placement of a Chirinos catheter.  He has no abdominal pain.  Physical examination unremarkable.  The Chirinos catheter was successfully inserted by our nurse at bedside.  Urinalysis shows faint WBCs and large leukocyte esterase but negative nitrite.  In the setting of a chronic indwelling Chirinos catheter suspect chronic colonization and low suspicion for acute infectious processes.  No indication for antibiotics at this time although urine culture in process at time of disposition.  Patient will be sent back to facility with a chronic indwelling Chirinos catheter.  Plans for replacement on a monthly basis with his primary care team.  Discharged home.    Disposition   The patient was discharged.     Diagnosis     ICD-10-CM    1. Chirinos catheter problem, initial encounter (H24)  T83.9XXA     Unable to replace chronic indwelling chirinos catheter at care facility      2. Chronic indwelling Chirinos catheter  Z97.8            Discharge Medications   Current Discharge Medication List            Scribe Disclosure:  I, Maryana Singleton, am serving as a scribe at 12:18 PM on 7/29/2024 to document services personally performed by Adriano Villasenor MD based on my observations and the provider's statements to me.        Adriano Villasenor MD  07/29/24 6718

## 2024-07-29 NOTE — ED TRIAGE NOTES
BIBA d/t catheter issue. Facility was going to flush and replace current catheter, but was unable to replace with another catheter.      Triage Assessment (Adult)       Row Name 07/29/24 5972          Triage Assessment    Airway WDL WDL        Respiratory WDL    Respiratory WDL WDL        Skin Circulation/Temperature WDL    Skin Circulation/Temperature WDL WDL        Cardiac WDL    Cardiac WDL WDL        Peripheral/Neurovascular WDL    Peripheral Neurovascular WDL WDL        Cognitive/Neuro/Behavioral WDL    Cognitive/Neuro/Behavioral WDL WDL

## 2024-07-29 NOTE — ED NOTES
Bed: ED26  Expected date:   Expected time:   Means of arrival:   Comments:  Mei 83M cath issue ETA [id number]

## 2024-07-31 ENCOUNTER — TELEPHONE (OUTPATIENT)
Dept: NURSING | Facility: CLINIC | Age: 84
End: 2024-07-31
Payer: MEDICARE

## 2024-07-31 LAB
BACTERIA UR CULT: ABNORMAL

## 2024-07-31 NOTE — TELEPHONE ENCOUNTER
[facility]    Reason for call: Lab Result Notification   Called Carri and left message with Gardenia .  Called pt number and all calls are taken by robel Dawn. She advised to call 3rd floor nurse.  Spoke with Hannah BLACK and she said to fax results  Lab Result (including Rx patient on, if applicable).  If culture, copy of lab report at bottom.  No antibiotic prescribed  Lab Result: urine culture prelim.    RN Recommendations/Instructions per [facility] ED lab result protocol:   [facility] ED lab result protocol utilized: urine culture    P  Bette Olivarez, RN

## 2024-08-01 NOTE — RESULT ENCOUNTER NOTE
[Final] result faxed to the [email or web address]]: [facility]  Name of Nurse: Blanca  Fax Number: [phone]

## 2024-08-01 NOTE — TELEPHONE ENCOUNTER
[facility]    Reason for call: Lab Result Notification     Lab Result (including Rx patient on, if applicable).  If culture, copy of lab report at bottom.  Lab Result: See below    Patient's current Symptoms:   NA    RN Recommendations/Instructions per [facility] ED lab result protocol:   [facility] ED lab result protocol utilized: urine culture  Spoke with SOFIA Rios at [facility],    Faxed final report to [phone]  They have a Provider who will review result and advised treatment.        Alejandro Snow RN

## 2024-08-07 ENCOUNTER — HOSPITAL ENCOUNTER (EMERGENCY)
Facility: CLINIC | Age: 84
Discharge: HOME | End: 2024-08-07
Attending: EMERGENCY MEDICINE | Admitting: EMERGENCY MEDICINE
Payer: MEDICARE

## 2024-08-07 VITALS
DIASTOLIC BLOOD PRESSURE: 61 MMHG | TEMPERATURE: 97.6 F | SYSTOLIC BLOOD PRESSURE: 116 MMHG | OXYGEN SATURATION: 95 % | RESPIRATION RATE: 18 BRPM | HEART RATE: 90 BPM

## 2024-08-07 DIAGNOSIS — T83.011A MALFUNCTION OF FOLEY CATHETER, INITIAL ENCOUNTER (H): ICD-10-CM

## 2024-08-07 LAB
ALBUMIN UR-MCNC: NEGATIVE MG/DL
APPEARANCE UR: CLEAR
BACTERIA #/AREA URNS HPF: ABNORMAL /HPF
BILIRUB UR QL STRIP: NEGATIVE
COLOR UR AUTO: ABNORMAL
GLUCOSE UR STRIP-MCNC: NEGATIVE MG/DL
HGB UR QL STRIP: ABNORMAL
HYALINE CASTS: 5 /LPF
KETONES UR STRIP-MCNC: NEGATIVE MG/DL
LEUKOCYTE ESTERASE UR QL STRIP: ABNORMAL
NITRATE UR QL: NEGATIVE
PH UR STRIP: 6 [PH] (ref 5–7)
RBC URINE: 30 /HPF
SP GR UR STRIP: 1.01 (ref 1–1.03)
SQUAMOUS EPITHELIAL: <1 /HPF
UROBILINOGEN UR STRIP-MCNC: NORMAL MG/DL
WBC URINE: 17 /HPF

## 2024-08-07 PROCEDURE — 99283 EMERGENCY DEPT VISIT LOW MDM: CPT | Mod: 25

## 2024-08-07 PROCEDURE — 81001 URINALYSIS AUTO W/SCOPE: CPT | Performed by: EMERGENCY MEDICINE

## 2024-08-07 PROCEDURE — 87086 URINE CULTURE/COLONY COUNT: CPT | Performed by: EMERGENCY MEDICINE

## 2024-08-07 ASSESSMENT — ACTIVITIES OF DAILY LIVING (ADL)
ADLS_ACUITY_SCORE: 39

## 2024-08-07 ASSESSMENT — COLUMBIA-SUICIDE SEVERITY RATING SCALE - C-SSRS
1. IN THE PAST MONTH, HAVE YOU WISHED YOU WERE DEAD OR WISHED YOU COULD GO TO SLEEP AND NOT WAKE UP?: NO
6. HAVE YOU EVER DONE ANYTHING, STARTED TO DO ANYTHING, OR PREPARED TO DO ANYTHING TO END YOUR LIFE?: NO
2. HAVE YOU ACTUALLY HAD ANY THOUGHTS OF KILLING YOURSELF IN THE PAST MONTH?: NO

## 2024-08-07 NOTE — ED NOTES
Bed: ED04  Expected date:   Expected time:   Means of arrival:   Comments:  PRAKASH 511 cath issues 83 M

## 2024-08-07 NOTE — DISCHARGE INSTRUCTIONS
*You may resume diet and activities.  *No new medications.  *Followup with your doctor for a recheck in 1-2 days.  *Return if the chirinos stops draining, you develop fever or vomiting or any worsening.

## 2024-08-07 NOTE — ED PROVIDER NOTES
Emergency Department Note      History of Present Illness     Chief Complaint   Catheter Problem      HPI   Ron Gilmore is a 83 year old male with a complicated past medical history of a chronic indwelling Cardona who presents to the emergency department from his care facility for evaluation of a catheter problem. He currently resides in a long term care facility, and was brought in by ambulance for evaluation of his catheter, which he states has not been draining since sometime in the morning. He has not been having any fevers. He reports that he did have some draining of urine this morning, but ever since then he thinks that his catheter has been blocked. He is also experiencing some discomfort and pain with this. No other symptoms, and no pain elsewhere.    Nursing was unable to flush the Cardona.  They replaced the Cardona.  Nurse reports that the bladder stone did come out with replacement.    Independent Historian   None    Review of External Notes   None    Past Medical History   Medical History and Problem List   BPH  Cataract  Cholelithiasis  COPD  Depression  Diabetes mellitus  Dyshidrotic foot dermatitis  Edema  Gout  HLD  HTN  Infection due to 2019 novel coronavirus  Kidney stones  Cholelithiasis  Lumbago  Lumbar disc displacement without myelopathy  Neuropathy, diabetic  Obesity  Spinal stenosis  Stroke  Urinary retention with incomplete bladder emptying  UTI  Vasovagal episode  SIRS  Vasovagal episode  CAP   Tibia/fibula fracture  Closed tibia fracture  CVA  Metabolic encephalopathy  Severe sepsis  Lactic acidosis  Obstructive right sided ureteral stone resulting in hydronephrosis  Acute respiratory failure with hypoxia  Acute and chronic respiratory failure with hypoxia  Iron deficiency anemia  Hemiparesis  DVT  Gastrointestinal hemorrhage  DNR  Chronic indwelling Cardona catheter    Medications   allopurinol  Aspirin 81 mg  atorvastatin  Emollient  formoterol  furosemide  ipratropium -  albuterol  loperamide   methenamine hippurate  metoprolol tartrate  pantoprazole  Paroxetine  senna-docusate  simethicone    Surgical History   Appendectomy  Arthroscopy shoulder and rotator cuff repair  Cataract removal  Cholecystectomy  Colonoscopy  Cystoscopy  EP loop recorder implant  EGD  Eye surgery  IVC filter placement  Nephrostomy tube placement  Ureteral stent placement right  Joint hip replacement  Knee surgery  Laminectomy lumbar  Laser holmium lithotripsy and stent insertion  tonsillectomy  Physical Exam     Patient Vitals for the past 24 hrs:   BP Temp Temp src Pulse Resp SpO2   08/07/24 1409 -- 97.6  F (36.4  C) Temporal -- 18 --   08/07/24 1404 -- -- -- -- -- 95 %   08/07/24 1403 116/61 -- -- 90 -- --     Physical Exam  General: Well-nourished,  appears to be resting comfortably when I enter the room  Eyes: PERRL, conjunctivae pink no scleral icterus or conjunctival injection  ENT:  Moist mucus membranes, posterior oropharynx clear without erythema or exudates  Respiratory:  Lungs clear to auscultation bilaterally, no crackles/rubs/wheezes.  Good air movement  CV: Normal rate and rhythm, no murmurs/rubs/gallops  GI:  Abdomen soft and non-distended.  Normoactive BS.  No tenderness, guarding or rebound  : Replace Cardona is draining clear yellow urine, there is approximately 500 mL in the Cardona bag.  Skin: Warm, dry.  No rashes or petechiae  Musculoskeletal: No peripheral edema or calf tenderness  Neuro: Alert and oriented to person/place/time  Psychiatric: Normal affect      Diagnostics     Lab Results   Labs Ordered and Resulted from Time of ED Arrival to Time of ED Departure   ROUTINE UA WITH MICROSCOPIC REFLEX TO CULTURE - Abnormal       Result Value    Color Urine Light Yellow      Appearance Urine Clear      Glucose Urine Negative      Bilirubin Urine Negative      Ketones Urine Negative      Specific Gravity Urine 1.013      Blood Urine Small (*)     pH Urine 6.0      Protein Albumin Urine  Negative      Urobilinogen Urine Normal      Nitrite Urine Negative      Leukocyte Esterase Urine Moderate (*)     Bacteria Urine Few (*)     RBC Urine 30 (*)     WBC Urine 17 (*)     Squamous Epithelials Urine <1      Hyaline Casts Urine 5 (*)    URINE CULTURE       Imaging   No orders to display       EKG   None    Independent Interpretation   None    ED Course      Medications Administered   Medications - No data to display    Procedures   Procedures     Discussion of Management   None    ED Course   ED Course as of 08/07/24 1603   Wed Aug 07, 2024   1413 I obtained history and examined the patient as noted above.     1518 I rechecked the patient and explained findings.  I discussed plan for discharge home.         Additional Documentation  None    Medical Decision Making / Diagnosis     CMS Diagnoses: None    MIPS       None    MDM   Ron Gilmore is a 83 year old male with a history of a chronic indwelling Cardona catheter that comes in for malfunction of his Cardona catheter.  Nursing was unable to flush it.  They replaced the catheter without difficulty and drained.  Catheter has not malfunctioning long enough that I would expect any acute kidney injury and patient has no signs or symptoms of urinary tract infection.  Urinalysis is not interpretable in the setting of a chronic indwelling Cardona catheter.  Is nitrite negative.  Urine culture is sent.  However, given no signs or symptoms of infection, I do not believe he needs antibiotics.  Given that the catheter is not functioning, he is safe for discharge back to his nursing facility.  Instructed to return if any worsening or signs of infection.    Disposition   The patient was discharged.     Diagnosis     ICD-10-CM    1. Malfunction of Cardona catheter, initial encounter (H24)  T83.011A            Discharge Medications   New Prescriptions    No medications on file         Scribe Disclosure:  I, Emeli Newell, am serving as a scribe at 4:02 PM on 8/7/2024 to  document services personally performed by Nicole Ibrahim MD based on my observations and the provider's statements to me.        Nicole Ibrahim MD  08/07/24 6682

## 2024-08-07 NOTE — ED TRIAGE NOTES
Patient arrived via EMS from his LTC facility with complaints of his catheter not draining.      Triage Assessment (Adult)       Row Name 08/07/24 9603          Triage Assessment    Airway WDL WDL        Respiratory WDL    Respiratory WDL WDL        Skin Circulation/Temperature WDL    Skin Circulation/Temperature WDL WDL        Cardiac WDL    Cardiac WDL WDL        Peripheral/Neurovascular WDL    Peripheral Neurovascular WDL WDL        Cognitive/Neuro/Behavioral WDL    Cognitive/Neuro/Behavioral WDL WDL

## 2024-08-08 LAB — BACTERIA UR CULT: NORMAL

## 2024-08-10 ENCOUNTER — HEALTH MAINTENANCE LETTER (OUTPATIENT)
Age: 84
End: 2024-08-10

## 2024-08-20 ENCOUNTER — HOSPITAL ENCOUNTER (INPATIENT)
Facility: CLINIC | Age: 84
LOS: 3 days | Discharge: HOME HEALTH | DRG: 871 | End: 2024-08-23
Attending: EMERGENCY MEDICINE | Admitting: INTERNAL MEDICINE
Payer: MEDICARE

## 2024-08-20 ENCOUNTER — APPOINTMENT (OUTPATIENT)
Dept: GENERAL RADIOLOGY | Facility: CLINIC | Age: 84
DRG: 871 | End: 2024-08-20
Attending: EMERGENCY MEDICINE
Payer: MEDICARE

## 2024-08-20 DIAGNOSIS — J96.21 ACUTE ON CHRONIC RESPIRATORY FAILURE WITH HYPOXIA (H): ICD-10-CM

## 2024-08-20 DIAGNOSIS — J69.0 ASPIRATION PNEUMONIA OF RIGHT LOWER LOBE, UNSPECIFIED ASPIRATION PNEUMONIA TYPE (H): ICD-10-CM

## 2024-08-20 DIAGNOSIS — J96.11 CHRONIC RESPIRATORY FAILURE WITH HYPOXIA (H): Primary | ICD-10-CM

## 2024-08-20 PROBLEM — R33.9 URINARY RETENTION: Status: ACTIVE | Noted: 2018-10-12

## 2024-08-20 PROBLEM — J44.9 COPD (CHRONIC OBSTRUCTIVE PULMONARY DISEASE) (H): Status: ACTIVE | Noted: 2018-10-12

## 2024-08-20 PROBLEM — M10.9 GOUT: Status: ACTIVE | Noted: 2024-08-20

## 2024-08-20 LAB
ANION GAP SERPL CALCULATED.3IONS-SCNC: 12 MMOL/L (ref 7–15)
ATRIAL RATE - MUSE: 107 BPM
BASE EXCESS BLDV CALC-SCNC: 8 MMOL/L (ref -3–3)
BASOPHILS # BLD AUTO: 0 10E3/UL (ref 0–0.2)
BASOPHILS NFR BLD AUTO: 0 %
BUN SERPL-MCNC: 17.2 MG/DL (ref 8–23)
CALCIUM SERPL-MCNC: 9.1 MG/DL (ref 8.8–10.4)
CHLORIDE SERPL-SCNC: 97 MMOL/L (ref 98–107)
CREAT SERPL-MCNC: 0.94 MG/DL (ref 0.67–1.17)
CREAT SERPL-MCNC: 0.94 MG/DL (ref 0.67–1.17)
DIASTOLIC BLOOD PRESSURE - MUSE: NORMAL MMHG
EGFRCR SERPLBLD CKD-EPI 2021: 81 ML/MIN/1.73M2
EGFRCR SERPLBLD CKD-EPI 2021: 81 ML/MIN/1.73M2
EOSINOPHIL # BLD AUTO: 0.2 10E3/UL (ref 0–0.7)
EOSINOPHIL NFR BLD AUTO: 1 %
ERYTHROCYTE [DISTWIDTH] IN BLOOD BY AUTOMATED COUNT: 15.5 % (ref 10–15)
FLUAV RNA SPEC QL NAA+PROBE: NEGATIVE
FLUBV RNA RESP QL NAA+PROBE: NEGATIVE
GLUCOSE BLDC GLUCOMTR-MCNC: 210 MG/DL (ref 70–99)
GLUCOSE SERPL-MCNC: 121 MG/DL (ref 70–99)
HBA1C MFR BLD: 6.5 %
HCO3 BLDV-SCNC: 35 MMOL/L (ref 21–28)
HCO3 SERPL-SCNC: 29 MMOL/L (ref 22–29)
HCT VFR BLD AUTO: 38.7 % (ref 40–53)
HGB BLD-MCNC: 11.8 G/DL (ref 13.3–17.7)
IMM GRANULOCYTES # BLD: 0.1 10E3/UL
IMM GRANULOCYTES NFR BLD: 1 %
INTERPRETATION ECG - MUSE: NORMAL
LACTATE BLD-SCNC: 2 MMOL/L
LYMPHOCYTES # BLD AUTO: 0.6 10E3/UL (ref 0.8–5.3)
LYMPHOCYTES NFR BLD AUTO: 5 %
MCH RBC QN AUTO: 28.6 PG (ref 26.5–33)
MCHC RBC AUTO-ENTMCNC: 30.5 G/DL (ref 31.5–36.5)
MCV RBC AUTO: 94 FL (ref 78–100)
MONOCYTES # BLD AUTO: 0.9 10E3/UL (ref 0–1.3)
MONOCYTES NFR BLD AUTO: 7 %
NEUTROPHILS # BLD AUTO: 11.2 10E3/UL (ref 1.6–8.3)
NEUTROPHILS NFR BLD AUTO: 86 %
NRBC # BLD AUTO: 0 10E3/UL
NRBC BLD AUTO-RTO: 0 /100
NT-PROBNP SERPL-MCNC: 190 PG/ML (ref 0–1800)
P AXIS - MUSE: 66 DEGREES
PCO2 BLDV: 59 MM HG (ref 40–50)
PH BLDV: 7.38 [PH] (ref 7.32–7.43)
PLATELET # BLD AUTO: 256 10E3/UL (ref 150–450)
PO2 BLDV: 21 MM HG (ref 25–47)
POTASSIUM SERPL-SCNC: 4.6 MMOL/L (ref 3.4–5.3)
PR INTERVAL - MUSE: 168 MS
PROCALCITONIN SERPL IA-MCNC: 0.06 NG/ML
QRS DURATION - MUSE: 116 MS
QT - MUSE: 352 MS
QTC - MUSE: 469 MS
R AXIS - MUSE: -10 DEGREES
RBC # BLD AUTO: 4.12 10E6/UL (ref 4.4–5.9)
RSV RNA SPEC NAA+PROBE: NEGATIVE
SAO2 % BLDV: 33 % (ref 70–75)
SARS-COV-2 RNA RESP QL NAA+PROBE: NEGATIVE
SODIUM SERPL-SCNC: 138 MMOL/L (ref 135–145)
SYSTOLIC BLOOD PRESSURE - MUSE: NORMAL MMHG
T AXIS - MUSE: 28 DEGREES
TROPONIN T SERPL HS-MCNC: 30 NG/L
VENTRICULAR RATE- MUSE: 107 BPM
WBC # BLD AUTO: 13 10E3/UL (ref 4–11)

## 2024-08-20 PROCEDURE — 83036 HEMOGLOBIN GLYCOSYLATED A1C: CPT | Performed by: INTERNAL MEDICINE

## 2024-08-20 PROCEDURE — 94640 AIRWAY INHALATION TREATMENT: CPT | Mod: 76

## 2024-08-20 PROCEDURE — 36415 COLL VENOUS BLD VENIPUNCTURE: CPT | Performed by: EMERGENCY MEDICINE

## 2024-08-20 PROCEDURE — 94640 AIRWAY INHALATION TREATMENT: CPT

## 2024-08-20 PROCEDURE — 87637 SARSCOV2&INF A&B&RSV AMP PRB: CPT | Performed by: EMERGENCY MEDICINE

## 2024-08-20 PROCEDURE — 87040 BLOOD CULTURE FOR BACTERIA: CPT | Performed by: EMERGENCY MEDICINE

## 2024-08-20 PROCEDURE — 84145 PROCALCITONIN (PCT): CPT | Performed by: EMERGENCY MEDICINE

## 2024-08-20 PROCEDURE — 120N000013 HC R&B IMCU

## 2024-08-20 PROCEDURE — 84484 ASSAY OF TROPONIN QUANT: CPT | Performed by: EMERGENCY MEDICINE

## 2024-08-20 PROCEDURE — 82803 BLOOD GASES ANY COMBINATION: CPT

## 2024-08-20 PROCEDURE — 85025 COMPLETE CBC W/AUTO DIFF WBC: CPT | Performed by: EMERGENCY MEDICINE

## 2024-08-20 PROCEDURE — 71045 X-RAY EXAM CHEST 1 VIEW: CPT

## 2024-08-20 PROCEDURE — 99223 1ST HOSP IP/OBS HIGH 75: CPT | Mod: AI | Performed by: INTERNAL MEDICINE

## 2024-08-20 PROCEDURE — 250N000009 HC RX 250: Performed by: EMERGENCY MEDICINE

## 2024-08-20 PROCEDURE — 999N000157 HC STATISTIC RCP TIME EA 10 MIN

## 2024-08-20 PROCEDURE — 96365 THER/PROPH/DIAG IV INF INIT: CPT

## 2024-08-20 PROCEDURE — 250N000011 HC RX IP 250 OP 636: Performed by: INTERNAL MEDICINE

## 2024-08-20 PROCEDURE — 99285 EMERGENCY DEPT VISIT HI MDM: CPT | Mod: 25

## 2024-08-20 PROCEDURE — 250N000011 HC RX IP 250 OP 636: Performed by: EMERGENCY MEDICINE

## 2024-08-20 PROCEDURE — 83880 ASSAY OF NATRIURETIC PEPTIDE: CPT | Performed by: EMERGENCY MEDICINE

## 2024-08-20 PROCEDURE — 250N000009 HC RX 250: Performed by: INTERNAL MEDICINE

## 2024-08-20 PROCEDURE — 82565 ASSAY OF CREATININE: CPT | Performed by: EMERGENCY MEDICINE

## 2024-08-20 PROCEDURE — 250N000013 HC RX MED GY IP 250 OP 250 PS 637: Performed by: INTERNAL MEDICINE

## 2024-08-20 RX ORDER — ALLOPURINOL 300 MG/1
300 TABLET ORAL EVERY MORNING
Status: DISCONTINUED | OUTPATIENT
Start: 2024-08-21 | End: 2024-08-23 | Stop reason: HOSPADM

## 2024-08-20 RX ORDER — HYDROMORPHONE HYDROCHLORIDE 2 MG/1
2 TABLET ORAL EVERY 4 HOURS PRN
Status: DISCONTINUED | OUTPATIENT
Start: 2024-08-20 | End: 2024-08-23 | Stop reason: HOSPADM

## 2024-08-20 RX ORDER — IPRATROPIUM BROMIDE AND ALBUTEROL SULFATE 2.5; .5 MG/3ML; MG/3ML
1 SOLUTION RESPIRATORY (INHALATION) 3 TIMES DAILY PRN
Status: DISCONTINUED | OUTPATIENT
Start: 2024-08-20 | End: 2024-08-23 | Stop reason: HOSPADM

## 2024-08-20 RX ORDER — AMOXICILLIN 250 MG
1 CAPSULE ORAL 2 TIMES DAILY PRN
Status: DISCONTINUED | OUTPATIENT
Start: 2024-08-20 | End: 2024-08-23 | Stop reason: HOSPADM

## 2024-08-20 RX ORDER — NALOXONE HYDROCHLORIDE 0.4 MG/ML
0.2 INJECTION, SOLUTION INTRAMUSCULAR; INTRAVENOUS; SUBCUTANEOUS
Status: DISCONTINUED | OUTPATIENT
Start: 2024-08-20 | End: 2024-08-23 | Stop reason: HOSPADM

## 2024-08-20 RX ORDER — IPRATROPIUM BROMIDE AND ALBUTEROL SULFATE 2.5; .5 MG/3ML; MG/3ML
3 SOLUTION RESPIRATORY (INHALATION)
Status: COMPLETED | OUTPATIENT
Start: 2024-08-20 | End: 2024-08-20

## 2024-08-20 RX ORDER — SIMETHICONE 125 MG
125 TABLET,CHEWABLE ORAL 3 TIMES DAILY PRN
Status: DISCONTINUED | OUTPATIENT
Start: 2024-08-20 | End: 2024-08-23 | Stop reason: HOSPADM

## 2024-08-20 RX ORDER — LIDOCAINE 40 MG/G
CREAM TOPICAL
Status: DISCONTINUED | OUTPATIENT
Start: 2024-08-20 | End: 2024-08-23 | Stop reason: HOSPADM

## 2024-08-20 RX ORDER — AMOXICILLIN 250 MG
2 CAPSULE ORAL 2 TIMES DAILY PRN
Status: DISCONTINUED | OUTPATIENT
Start: 2024-08-20 | End: 2024-08-23 | Stop reason: HOSPADM

## 2024-08-20 RX ORDER — METHENAMINE HIPPURATE 1000 MG/1
1 TABLET ORAL 2 TIMES DAILY
Status: DISCONTINUED | OUTPATIENT
Start: 2024-08-20 | End: 2024-08-23 | Stop reason: HOSPADM

## 2024-08-20 RX ORDER — AMOXICILLIN 250 MG
1 CAPSULE ORAL DAILY PRN
Status: DISCONTINUED | OUTPATIENT
Start: 2024-08-20 | End: 2024-08-20

## 2024-08-20 RX ORDER — NALOXONE HYDROCHLORIDE 0.4 MG/ML
0.4 INJECTION, SOLUTION INTRAMUSCULAR; INTRAVENOUS; SUBCUTANEOUS
Status: DISCONTINUED | OUTPATIENT
Start: 2024-08-20 | End: 2024-08-23 | Stop reason: HOSPADM

## 2024-08-20 RX ORDER — METHYLPREDNISOLONE SODIUM SUCCINATE 125 MG/2ML
125 INJECTION, POWDER, LYOPHILIZED, FOR SOLUTION INTRAMUSCULAR; INTRAVENOUS ONCE
Status: DISCONTINUED | OUTPATIENT
Start: 2024-08-20 | End: 2024-08-20

## 2024-08-20 RX ORDER — FORMOTEROL FUMARATE DIHYDRATE 20 UG/2ML
20 SOLUTION RESPIRATORY (INHALATION) EVERY 12 HOURS
COMMUNITY

## 2024-08-20 RX ORDER — CEFTRIAXONE 2 G/1
2 INJECTION, POWDER, FOR SOLUTION INTRAMUSCULAR; INTRAVENOUS ONCE
Status: COMPLETED | OUTPATIENT
Start: 2024-08-20 | End: 2024-08-20

## 2024-08-20 RX ORDER — AMPICILLIN AND SULBACTAM 2; 1 G/1; G/1
3 INJECTION, POWDER, FOR SOLUTION INTRAMUSCULAR; INTRAVENOUS EVERY 6 HOURS
Status: DISCONTINUED | OUTPATIENT
Start: 2024-08-20 | End: 2024-08-23 | Stop reason: HOSPADM

## 2024-08-20 RX ORDER — CALCIUM CARBONATE 500 MG/1
1000 TABLET, CHEWABLE ORAL 4 TIMES DAILY PRN
Status: DISCONTINUED | OUTPATIENT
Start: 2024-08-20 | End: 2024-08-23 | Stop reason: HOSPADM

## 2024-08-20 RX ORDER — ATORVASTATIN CALCIUM 10 MG/1
10 TABLET, FILM COATED ORAL EVERY MORNING
Status: DISCONTINUED | OUTPATIENT
Start: 2024-08-21 | End: 2024-08-23 | Stop reason: HOSPADM

## 2024-08-20 RX ORDER — FORMOTEROL FUMARATE DIHYDRATE 20 UG/2ML
20 SOLUTION RESPIRATORY (INHALATION) EVERY 12 HOURS
Status: DISCONTINUED | OUTPATIENT
Start: 2024-08-20 | End: 2024-08-23 | Stop reason: HOSPADM

## 2024-08-20 RX ORDER — ACETAMINOPHEN 325 MG/1
650 TABLET ORAL EVERY 4 HOURS PRN
Status: DISCONTINUED | OUTPATIENT
Start: 2024-08-20 | End: 2024-08-23 | Stop reason: HOSPADM

## 2024-08-20 RX ORDER — FUROSEMIDE 20 MG
20 TABLET ORAL DAILY
Status: DISCONTINUED | OUTPATIENT
Start: 2024-08-21 | End: 2024-08-23 | Stop reason: HOSPADM

## 2024-08-20 RX ORDER — ASPIRIN 81 MG/1
81 TABLET ORAL DAILY
Status: DISCONTINUED | OUTPATIENT
Start: 2024-08-21 | End: 2024-08-23 | Stop reason: HOSPADM

## 2024-08-20 RX ORDER — LOPERAMIDE HCL 2 MG
2 CAPSULE ORAL EVERY 6 HOURS PRN
Status: DISCONTINUED | OUTPATIENT
Start: 2024-08-20 | End: 2024-08-23 | Stop reason: HOSPADM

## 2024-08-20 RX ORDER — AMMONIUM LACTATE 12 G/100G
LOTION TOPICAL DAILY
COMMUNITY

## 2024-08-20 RX ORDER — PANTOPRAZOLE SODIUM 40 MG/1
40 TABLET, DELAYED RELEASE ORAL 2 TIMES DAILY
Status: DISCONTINUED | OUTPATIENT
Start: 2024-08-20 | End: 2024-08-23 | Stop reason: HOSPADM

## 2024-08-20 RX ORDER — AMOXICILLIN 250 MG
1 CAPSULE ORAL 2 TIMES DAILY
Status: DISCONTINUED | OUTPATIENT
Start: 2024-08-20 | End: 2024-08-23 | Stop reason: HOSPADM

## 2024-08-20 RX ORDER — ENOXAPARIN SODIUM 100 MG/ML
40 INJECTION SUBCUTANEOUS EVERY 24 HOURS
Status: DISCONTINUED | OUTPATIENT
Start: 2024-08-20 | End: 2024-08-23 | Stop reason: HOSPADM

## 2024-08-20 RX ADMIN — FORMOTEROL FUMARATE DIHYDRATE 20 MCG: 20 SOLUTION RESPIRATORY (INHALATION) at 23:42

## 2024-08-20 RX ADMIN — METOPROLOL TARTRATE 12.5 MG: 25 TABLET, FILM COATED ORAL at 23:37

## 2024-08-20 RX ADMIN — PANTOPRAZOLE SODIUM 40 MG: 40 TABLET, DELAYED RELEASE ORAL at 23:02

## 2024-08-20 RX ADMIN — METHENAMINE HIPPURATE 1 G: 1 TABLET ORAL at 23:37

## 2024-08-20 RX ADMIN — IPRATROPIUM BROMIDE AND ALBUTEROL SULFATE 3 ML: .5; 3 SOLUTION RESPIRATORY (INHALATION) at 17:13

## 2024-08-20 RX ADMIN — CEFTRIAXONE SODIUM 2 G: 2 INJECTION, POWDER, FOR SOLUTION INTRAMUSCULAR; INTRAVENOUS at 18:13

## 2024-08-20 RX ADMIN — METHYLPREDNISOLONE SODIUM SUCCINATE 125 MG: 125 INJECTION, POWDER, FOR SOLUTION INTRAMUSCULAR; INTRAVENOUS at 19:02

## 2024-08-20 RX ADMIN — ENOXAPARIN SODIUM 40 MG: 40 INJECTION SUBCUTANEOUS at 22:53

## 2024-08-20 RX ADMIN — IPRATROPIUM BROMIDE AND ALBUTEROL SULFATE 3 ML: .5; 3 SOLUTION RESPIRATORY (INHALATION) at 18:14

## 2024-08-20 RX ADMIN — AMPICILLIN SODIUM AND SULBACTAM SODIUM 3 G: 2; 1 INJECTION, POWDER, FOR SOLUTION INTRAMUSCULAR; INTRAVENOUS at 22:53

## 2024-08-20 ASSESSMENT — ACTIVITIES OF DAILY LIVING (ADL)
ADLS_ACUITY_SCORE: 39

## 2024-08-20 NOTE — PHARMACY-ADMISSION MEDICATION HISTORY
Pharmacist Admission Medication History    Admission medication history is complete. The information provided in this note is only as accurate as the sources available at the time of the update.    Information Source(s): Facility (U/NH/) medication list/MAR and CareEverywhere/SureScripts via N/A    Pertinent Information: List obtained from [facility].    Changes made to PTA medication list:  Added: Lac-hydrin, Zoloft  Deleted: paxil  Changed:   Senna-docusate daily --> BID    Allergies reviewed with patient and updates made in EHR: unable to assess    Medication History Completed By: Juli Mcnair [facility] 8/20/2024 5:38 PM    PTA Med List   Medication Sig Last Dose    acetaminophen (TYLENOL) 325 MG tablet Take 650 mg by mouth every 4 hours as needed for mild pain as needed for pain/fever Max dose 3000mg/24hr  at PRN    allopurinol (ZYLOPRIM) 300 MG tablet Take 300 mg by mouth every morning 0900 8/20/2024 at 0900    ammonium lactate (LAC-HYDRIN) 12 % external lotion Apply topically daily Apply a thin film to bilateral feet and legs 8/20/2024 at 0900    aspirin (ASA) 81 MG EC tablet Take 1 tablet (81 mg) by mouth daily (Patient taking differently: Take 81 mg by mouth daily 0900) 8/20/2024 at 0900    atorvastatin (LIPITOR) 10 MG tablet Take 10 mg by mouth every morning 0900 8/20/2024 at 0900    Emollient (AMLACTIN ULTRA EX) Apply topically daily as needed To feet  at PRN    formoterol (PERFOROMIST) 20 MCG/2ML neb solution Take 20 mcg by nebulization every 12 hours 1000, 2300 8/20/2024 at 1000    furosemide (LASIX) 20 MG tablet Take 20 mg by mouth daily 0900 8/20/2024 at 0900    hypromellose (ARTIFICIAL TEARS) 0.5 % SOLN ophthalmic solution Place 2 drops into both eyes 2 times daily 0800, 2000 8/20/2024 at 0800    hypromellose (ARTIFICIAL TEARS) 0.5 % SOLN ophthalmic solution Place 2 drops into both eyes 2 times daily as needed for dry eyes  at PRN    ipratropium - albuterol 0.5 mg/2.5 mg/3 mL (DUONEB) 0.5-2.5  (3) MG/3ML neb solution Take 1 vial by nebulization 3 times daily as needed for shortness of breath, wheezing or cough  at PRN    ipratropium-albuterol (COMBIVENT RESPIMAT)  MCG/ACT inhaler Inhale 1 puff into the lungs 4 times daily 0900, 1200 ,1600 ,2000 8/20/2024 at 1200    loperamide (IMODIUM) 2 MG capsule Take 2 mg by mouth every 6 hours as needed for diarrhea  at PRN    methenamine hippurate (HIPREX) 1 g tablet Take 1 g by mouth 2 times daily 0900, 2000 8/20/2024 at 0900    metoprolol tartrate (LOPRESSOR) 25 MG tablet Take 0.5 tablets (12.5 mg) by mouth 2 times daily (Patient taking differently: Take 12.5 mg by mouth 2 times daily 0900, 2000) 8/20/2024 at 0900    pantoprazole (PROTONIX) 40 MG EC tablet Take 40 mg by mouth 2 times daily 0900, 2000 8/20/2024 at 0900    senna-docusate (SENOKOT-S/PERICOLACE) 8.6-50 MG tablet Take 1 tablet by mouth 2 times daily 0900, 2000 8/20/2024 at 0900    senna-docusate (SENOKOT-S/PERICOLACE) 8.6-50 MG tablet Take 1 tablet by mouth daily as needed for constipation  at PRN    sertraline (ZOLOFT) 50 MG tablet Take 50 mg by mouth daily 0900 8/20/2024 at 0900    simethicone (MYLICON) 125 MG chewable tablet Take 125 mg by mouth 3 times daily as needed for intestinal gas  at PRN    Skin Protectants, Misc. (LANTISEPTIC SKIN PROTECTANT EX) Externally apply topically 2 times daily as needed Apply a thin film to vincent area/buttocks  at PRN    Skin Protectants, Misc. (LANTISEPTIC SKIN PROTECTANT EX) Externally apply topically 2 times daily Apply a thin film to vincent area/buttocks 8/20/2024 at 0900    Vitamin D3 (VITAMIN D, CHOLECALCIFEROL,) 25 mcg (1000 units) tablet Take 1 tablet by mouth daily 0800 8/20/2024 at 0800

## 2024-08-20 NOTE — ED PROVIDER NOTES
Emergency Department Note      History of Present Illness     Chief Complaint  Shortness of Breath    History limited by acute illness.  HPI  Ron Gilmore is a 83 year old male with a history of aspiration pneumonia, previous CVA, type 2 diabetes, COPD, heart failure, who presents with shortness of breath. Patient reports his symptoms onset out of the blue at 1500 this afternoon accompanied by chills and generalized weakness/myalgias. He also endorses a cough. No fevers, chest pain, leg swelling. Is on 2L of oxygen at baseline.     Independent Historian  None    Review of External Notes  Patient seen most recently in the ED for malfunction of the Cardona catheter.    I reviewed most recent admission 9/24/2023.  Patient admitted with possible aspiration pneumonia, oropharyngeal dysphagia, COPD exacerbation.  Most recent CODE STATUS was DNR/DNI.    Past Medical History   Medical History and Problem List   BPH  Cataract  Cholelithiasis  COPD  Depression  Diabetes mellitus  Dyshidrotic foot dermatitis  Edema  Gout  HLD  HTN  Infection due to 2019 novel coronavirus  Kidney stones  Cholelithiasis  Lumbago  Lumbar disc displacement without myelopathy  Neuropathy, diabetic  Obesity  Spinal stenosis  Stroke  Urinary retention with incomplete bladder emptying  UTI  Vasovagal episode  SIRS  Vasovagal episode  CAP   Tibia/fibula fracture  Closed tibia fracture  CVA  Metabolic encephalopathy  Severe sepsis  Lactic acidosis  Obstructive right sided ureteral stone resulting in hydronephrosis  Acute respiratory failure with hypoxia  Acute and chronic respiratory failure with hypoxia  Iron deficiency anemia  Hemiparesis  DVT  Gastrointestinal hemorrhage  DNR  Chronic indwelling Cardona catheter     Medications   allopurinol  Aspirin 81 mg  atorvastatin  formoterol  furosemide  ipratropium - albuterol  loperamide   methenamine hippurate  metoprolol tartrate  pantoprazole  Paroxetine  senna-docusate  simethicone     Surgical History    Appendectomy  Arthroscopy shoulder and rotator cuff repair  Cataract removal  Cholecystectomy  Colonoscopy  Cystoscopy  EP loop recorder implant  EGD  Eye surgery  IVC filter placement  Nephrostomy tube placement  Ureteral stent placement right  Joint hip replacement  Knee surgery  Laminectomy lumbar  Laser holmium lithotripsy and stent insertion  Tonsillectomy    Physical Exam   Patient Vitals for the past 24 hrs:   BP Temp Temp src Pulse Resp SpO2 Weight   08/20/24 2045 -- -- -- 98 20 -- --   08/20/24 2030 118/70 -- -- 98 20 -- --   08/20/24 2015 -- -- -- 100 20 -- --   08/20/24 2000 116/66 -- -- 103 22 -- --   08/20/24 1945 -- -- -- 106 14 -- --   08/20/24 1930 135/70 -- -- 107 24 -- --   08/20/24 1905 (!) 133/95 -- -- 112 24 95 % --   08/20/24 1900 -- -- -- 110 27 -- --   08/20/24 1808 128/59 (!) 100.5  F (38.1  C) Oral -- -- 97 % 99.8 kg (220 lb)   08/20/24 1800 -- -- -- 111 25 -- --   08/20/24 1749 -- -- -- 112 25 98 % --   08/20/24 1726 -- -- -- -- -- 93 % --   08/20/24 1700 (!) 164/73 -- -- 108 29 96 % --     Physical Exam  General: appears fatigued, answers questions  HENT: mucous membranes moist  CV: tachycardic rate, regular rhythm  Resp: Coarse bilateral breath sounds, wet sounding lungs.  GI: abdomen soft and nontender, no guarding  MSK: Atrophy to the left upper extremity and left lower extremity.  Skin: appropriately warm and dry  Extremities: no edema, calves non-tender  Neuro: Fatigued, chronic weakness in the left upper and lower extremities.  Psych: Able to assess      Diagnostics   Lab Results   Labs Ordered and Resulted from Time of ED Arrival to Time of ED Departure   BASIC METABOLIC PANEL - Abnormal       Result Value    Sodium 138      Potassium 4.6      Chloride 97 (*)     Carbon Dioxide (CO2) 29      Anion Gap 12      Urea Nitrogen 17.2      Creatinine 0.94      GFR Estimate 81      Calcium 9.1      Glucose 121 (*)    TROPONIN T, HIGH SENSITIVITY - Abnormal    Troponin T, High Sensitivity  30 (*)    CBC WITH PLATELETS AND DIFFERENTIAL - Abnormal    WBC Count 13.0 (*)     RBC Count 4.12 (*)     Hemoglobin 11.8 (*)     Hematocrit 38.7 (*)     MCV 94      MCH 28.6      MCHC 30.5 (*)     RDW 15.5 (*)     Platelet Count 256      % Neutrophils 86      % Lymphocytes 5      % Monocytes 7      % Eosinophils 1      % Basophils 0      % Immature Granulocytes 1      NRBCs per 100 WBC 0      Absolute Neutrophils 11.2 (*)     Absolute Lymphocytes 0.6 (*)     Absolute Monocytes 0.9      Absolute Eosinophils 0.2      Absolute Basophils 0.0      Absolute Immature Granulocytes 0.1      Absolute NRBCs 0.0     ISTAT GASES LACTATE VENOUS POCT - Abnormal    Lactic Acid POCT 2.0      Bicarbonate Venous POCT 35 (*)     O2 Sat, Venous POCT 33 (*)     pCO2 Venous POCT 59 (*)     pH Venous POCT 7.38      pO2 Venous POCT 21 (*)     Base Excess/Deficit (+/-) POCT 8.0 (*)    INFLUENZA A/B, RSV, & SARS-COV2 PCR - Normal    Influenza A PCR Negative      Influenza B PCR Negative      RSV PCR Negative      SARS CoV2 PCR Negative     NT PROBNP INPATIENT - Normal    N terminal Pro BNP Inpatient 190     PROCALCITONIN - Normal    Procalcitonin 0.06     BLOOD CULTURE       Imaging  XR Chest Port 1 View   Final Result   IMPRESSION: Low lung volumes. No acute infiltrates.        Independent Interpretation  CXR: No pneumothorax, low lung volumes, no focal infiltrates.    ED Course    Medications Administered  Medications   methylPREDNISolone Na Suc (solu-MEDROL) injection 125 mg (has no administration in time range)   ipratropium - albuterol 0.5 mg/2.5 mg/3 mL (DUONEB) neb solution 3 mL (3 mLs Nebulization $Given 8/20/24 1814)   cefTRIAXone (ROCEPHIN) 2 g vial to attach to  ml bag for ADULTS or NS 50 ml bag for PEDS (2 g Intravenous $New Bag 8/20/24 1813)       Discussion of Management  Admitting Hospitalist, Dr. Echeverria    Social Determinants of Health adding to complexity of care  None    ED Course  ED Course as of 08/20/24 1839   Tue  Aug 20, 2024   1659 I evaluated the patient.    1849 C/w Dr. Echeverria, hospitalist.        Medical Decision Making / Diagnosis   CMS Diagnoses:   The patient has signs of Severe Sepsis  If one the following conditions is present, a 30 mL/kg bolus is recommended as part of the 6 hour bundle (IBW can be used for BMI >30, or document refusal/contraindication):      1.   Initial hypotension  defined as 2 bps < 90 or map < 65 in the 6hrs before or 3hrs after time zero.     2.  Lactate >4.      The patient has signs of Severe Sepsis as evidenced by:    1. 2 SIRS criteria, AND  2. Suspected infection, AND   3. Organ dysfunction: New need for positive pressure ventilation    Time severe sepsis diagnosis confirmed: 1745 08/20/24 as this was the time when Lactate resulted, and the level was > 2.0    3 Hour Severe Sepsis Bundle Completion:    1. Initial Lactic Acid Result:   Recent Labs   Lab Test 08/20/24  1736 09/20/23  1243 05/06/23  0308   LACT 2.0 1.9 1.3     2. Blood Cultures before Antibiotics: Yes  Note: Due to a national blood culture bottle shortage, reduced blood cultures may have been drawn on this patient.  3. Broad Spectrum Antibiotics Administered:  yes       Anti-infectives (From admission through now)      Start     Dose/Rate Route Frequency Ordered Stop    08/20/24 1745  cefTRIAXone (ROCEPHIN) 2 g vial to attach to  ml bag for ADULTS or NS 50 ml bag for PEDS         2 g  over 30 Minutes Intravenous ONCE 08/20/24 1743 08/20/24 1843            4. Is initial hypotension present?     No (IV fluid bolus NOT required). IV Fluid volume administered: none                      MIPS  None    [facility]  Ron Gilmore is a 83 year old male with a history of COPD, CHF, aspiration pneumonia, CVA, type 2 diabetes, presenting today with shortness of breath, fever, and hypoxia.  On exam, the patient has 6 L oxygen requirement, well above his regular 2 L oxygen requirement.  He has coarse breath sounds bilaterally, and a  temperature of 100.5.  He has responded well in the ED to high flow nasal cannula.  VBG does not demonstrate any retention or acidosis, therefore I do not think he needs additional supportive measures.  He is not a good candidate for BiPAP given that he is bearded.  EKG negative for acute ischemic changes.  Troponin is elevated, but this appears to be baseline for the patient.  He is not complaining of chest pain in the ED.  He was noted to have an elevated white blood cell count, and was started on treatment for commune acquired pneumonia and COPD exacerbation.  Plan to admit to the hospitalist service for continued resuscitation, respiratory support.    Disposition  The patient was admitted to the hospital.     ICD-10 Codes:    ICD-10-CM    1. Acute on chronic respiratory failure with hypoxia (H)  J96.21            Scribe Disclosure:  DOTTIE LAUREN, am serving as a scribe at 4:55 PM on 8/20/2024 to document services personally performed by Corinna Powers MD based on my observations and the provider's statements to me.     [employer]      Corinna Powers MD  08/20/24 [id number]

## 2024-08-20 NOTE — ED TRIAGE NOTES
Pt arrived via EMS due to having increased SOB since 2pm today. Hx of COPD and uses 2lpm of O2 at baseline.   Per EMS, pt resides at [facility].

## 2024-08-21 ENCOUNTER — APPOINTMENT (OUTPATIENT)
Dept: SPEECH THERAPY | Facility: CLINIC | Age: 84
DRG: 871 | End: 2024-08-21
Attending: INTERNAL MEDICINE
Payer: MEDICARE

## 2024-08-21 LAB
ANION GAP SERPL CALCULATED.3IONS-SCNC: 11 MMOL/L (ref 7–15)
BUN SERPL-MCNC: 21.7 MG/DL (ref 8–23)
CALCIUM SERPL-MCNC: 9.3 MG/DL (ref 8.8–10.4)
CHLORIDE SERPL-SCNC: 97 MMOL/L (ref 98–107)
CREAT SERPL-MCNC: 0.89 MG/DL (ref 0.67–1.17)
EGFRCR SERPLBLD CKD-EPI 2021: 86 ML/MIN/1.73M2
ERYTHROCYTE [DISTWIDTH] IN BLOOD BY AUTOMATED COUNT: 15.4 % (ref 10–15)
GLUCOSE BLDC GLUCOMTR-MCNC: 110 MG/DL (ref 70–99)
GLUCOSE BLDC GLUCOMTR-MCNC: 126 MG/DL (ref 70–99)
GLUCOSE BLDC GLUCOMTR-MCNC: 133 MG/DL (ref 70–99)
GLUCOSE BLDC GLUCOMTR-MCNC: 149 MG/DL (ref 70–99)
GLUCOSE BLDC GLUCOMTR-MCNC: 188 MG/DL (ref 70–99)
GLUCOSE SERPL-MCNC: 196 MG/DL (ref 70–99)
HCO3 SERPL-SCNC: 28 MMOL/L (ref 22–29)
HCT VFR BLD AUTO: 39.9 % (ref 40–53)
HGB BLD-MCNC: 12.2 G/DL (ref 13.3–17.7)
MCH RBC QN AUTO: 28.9 PG (ref 26.5–33)
MCHC RBC AUTO-ENTMCNC: 30.6 G/DL (ref 31.5–36.5)
MCV RBC AUTO: 95 FL (ref 78–100)
PLATELET # BLD AUTO: 222 10E3/UL (ref 150–450)
POTASSIUM SERPL-SCNC: 4.5 MMOL/L (ref 3.4–5.3)
RBC # BLD AUTO: 4.22 10E6/UL (ref 4.4–5.9)
SODIUM SERPL-SCNC: 136 MMOL/L (ref 135–145)
WBC # BLD AUTO: 11.6 10E3/UL (ref 4–11)

## 2024-08-21 PROCEDURE — 94640 AIRWAY INHALATION TREATMENT: CPT

## 2024-08-21 PROCEDURE — 80048 BASIC METABOLIC PNL TOTAL CA: CPT | Performed by: INTERNAL MEDICINE

## 2024-08-21 PROCEDURE — 36415 COLL VENOUS BLD VENIPUNCTURE: CPT | Performed by: INTERNAL MEDICINE

## 2024-08-21 PROCEDURE — 250N000011 HC RX IP 250 OP 636: Performed by: INTERNAL MEDICINE

## 2024-08-21 PROCEDURE — 999N000157 HC STATISTIC RCP TIME EA 10 MIN

## 2024-08-21 PROCEDURE — 92526 ORAL FUNCTION THERAPY: CPT | Mod: GN | Performed by: SPEECH-LANGUAGE PATHOLOGIST

## 2024-08-21 PROCEDURE — 85027 COMPLETE CBC AUTOMATED: CPT | Performed by: INTERNAL MEDICINE

## 2024-08-21 PROCEDURE — 250N000013 HC RX MED GY IP 250 OP 250 PS 637: Performed by: INTERNAL MEDICINE

## 2024-08-21 PROCEDURE — 120N000013 HC R&B IMCU

## 2024-08-21 PROCEDURE — 99233 SBSQ HOSP IP/OBS HIGH 50: CPT | Performed by: HOSPITALIST

## 2024-08-21 PROCEDURE — 250N000009 HC RX 250: Performed by: INTERNAL MEDICINE

## 2024-08-21 PROCEDURE — 92610 EVALUATE SWALLOWING FUNCTION: CPT | Mod: GN | Performed by: SPEECH-LANGUAGE PATHOLOGIST

## 2024-08-21 RX ORDER — DEXTROSE MONOHYDRATE 25 G/50ML
25-50 INJECTION, SOLUTION INTRAVENOUS
Status: DISCONTINUED | OUTPATIENT
Start: 2024-08-21 | End: 2024-08-23 | Stop reason: HOSPADM

## 2024-08-21 RX ORDER — NICOTINE POLACRILEX 4 MG
15-30 LOZENGE BUCCAL
Status: DISCONTINUED | OUTPATIENT
Start: 2024-08-21 | End: 2024-08-23 | Stop reason: HOSPADM

## 2024-08-21 RX ADMIN — AMPICILLIN SODIUM AND SULBACTAM SODIUM 3 G: 2; 1 INJECTION, POWDER, FOR SOLUTION INTRAMUSCULAR; INTRAVENOUS at 09:18

## 2024-08-21 RX ADMIN — PANTOPRAZOLE SODIUM 40 MG: 40 TABLET, DELAYED RELEASE ORAL at 21:08

## 2024-08-21 RX ADMIN — METOPROLOL TARTRATE 12.5 MG: 25 TABLET, FILM COATED ORAL at 21:07

## 2024-08-21 RX ADMIN — SENNOSIDES AND DOCUSATE SODIUM 1 TABLET: 50; 8.6 TABLET ORAL at 09:11

## 2024-08-21 RX ADMIN — ATORVASTATIN CALCIUM 10 MG: 10 TABLET, FILM COATED ORAL at 09:11

## 2024-08-21 RX ADMIN — SERTRALINE HYDROCHLORIDE 50 MG: 50 TABLET ORAL at 09:11

## 2024-08-21 RX ADMIN — AMPICILLIN SODIUM AND SULBACTAM SODIUM 3 G: 2; 1 INJECTION, POWDER, FOR SOLUTION INTRAMUSCULAR; INTRAVENOUS at 04:09

## 2024-08-21 RX ADMIN — PANTOPRAZOLE SODIUM 40 MG: 40 TABLET, DELAYED RELEASE ORAL at 09:11

## 2024-08-21 RX ADMIN — SENNOSIDES AND DOCUSATE SODIUM 1 TABLET: 50; 8.6 TABLET ORAL at 21:08

## 2024-08-21 RX ADMIN — METHENAMINE HIPPURATE 1 G: 1 TABLET ORAL at 09:18

## 2024-08-21 RX ADMIN — FORMOTEROL FUMARATE DIHYDRATE 20 MCG: 20 SOLUTION RESPIRATORY (INHALATION) at 07:14

## 2024-08-21 RX ADMIN — ASPIRIN 81 MG: 81 TABLET, COATED ORAL at 09:11

## 2024-08-21 RX ADMIN — ENOXAPARIN SODIUM 40 MG: 40 INJECTION SUBCUTANEOUS at 23:24

## 2024-08-21 RX ADMIN — AMPICILLIN SODIUM AND SULBACTAM SODIUM 3 G: 2; 1 INJECTION, POWDER, FOR SOLUTION INTRAMUSCULAR; INTRAVENOUS at 21:11

## 2024-08-21 RX ADMIN — METOPROLOL TARTRATE 12.5 MG: 25 TABLET, FILM COATED ORAL at 09:11

## 2024-08-21 RX ADMIN — AMPICILLIN SODIUM AND SULBACTAM SODIUM 3 G: 2; 1 INJECTION, POWDER, FOR SOLUTION INTRAMUSCULAR; INTRAVENOUS at 15:28

## 2024-08-21 RX ADMIN — METHENAMINE HIPPURATE 1 G: 1 TABLET ORAL at 21:09

## 2024-08-21 RX ADMIN — ALLOPURINOL 300 MG: 300 TABLET ORAL at 09:11

## 2024-08-21 ASSESSMENT — ACTIVITIES OF DAILY LIVING (ADL)
ADLS_ACUITY_SCORE: 55
ADLS_ACUITY_SCORE: 50
ADLS_ACUITY_SCORE: 55
ADLS_ACUITY_SCORE: 57
ADLS_ACUITY_SCORE: 50
ADLS_ACUITY_SCORE: 51
ADLS_ACUITY_SCORE: 50
ADLS_ACUITY_SCORE: 55
ADLS_ACUITY_SCORE: 55
ADLS_ACUITY_SCORE: 50
ADLS_ACUITY_SCORE: 55
ADLS_ACUITY_SCORE: 50
ADLS_ACUITY_SCORE: 55
ADLS_ACUITY_SCORE: 50

## 2024-08-21 NOTE — PLAN OF CARE
Goal Outcome Evaluation:      Plan of Care Reviewed With: patient    Orientations: A&O x4  Vitals/Pain: afebrile, VSS, on HFNC at 50% FiO2 and 30L O2, tolerated  Tele: SR with occasional PVCs and BBB  Lines/Drains: R PIV-SL, abx given  Skin/Wounds: dual skin checks done with [facility] RN; L ear redness with surgical defect, non-blanchable redness on coccyx, covered with mepilex. Redness on perineum, for WOC consult; L knee and R hip surgical scar; BLE contractured; left-sided weakness post CVA  GI/: incontinent, had a BM, chronic Cardona in place, pericares and cath care done  Labs: Abnormal/Trends, Electrolyte Replacement- routine  Ambulation/Assist: A2-CL, turn and repo regularly  Sleep Quality: short intervals  Plan: TBD

## 2024-08-21 NOTE — PLAN OF CARE
Patient Name: Indigo  MRN: [id number]  Date of Admission: 8/20/2024  Reason for Admission: Sepsis, Aspiration PNA   Level of Care: IMC    Resp: HFNC @ 30L this AM, weened to 2L of O2 by this evening. 2L O2 is baseline for patient.  Telemetry: SR w BBB, occasional PVC.   Neuro: AxOx4, left side flaccid/contracted from previous CVA  GI/: Incontinent of bowel. Chronic catheter in place.  Skin/Wounds: Non-blanchable redness to coccyx, WOC consult placed. Otherwise skin intact.  Lines/Drains: Chronic chirinos in place.  Activity: Repo Q2, baseline wheelchair bound.  Abnormal Labs: Labs WDL.    Aggression Stop Light: Green          Patient Care Plan: Pt denies respiratory complaints. States he coughs a lot while drinking fluids. Speech therapy consulted and evaluated, placed on moderately thick liquids and minced/moist. Continuing FELIX. Updated family.

## 2024-08-21 NOTE — H&P
[facility]    History and Physical - Hospitalist Service       Date of Admission:  8/20/2024    Assessment & Plan      Ron Gilmore is a 83 year old male with numerous medical problems including COPD, on 2 L nasal cannula oxygen, gout, major depression, history of basal cell cancer requiring surgery, urinary retention with chronic Cardona, dysphagia with history of aspiration presents emergency department with fever and shortness of breath.    Principal Problem:    Severe sepsis (H)    Acute on chronic respiratory failure with hypoxia (H)  Clinical history, lab and radiologic workup are consistent with severe sepsis, presumed due to pneumonia, possibly aspiration  Admit as inpatient-IMC  Treat with broad-spectrum antibiotics per pneumonia order set, including Unasyn  Follow-up blood cultures, urine cultures.  Reviewed patient's microbiology for the past year which includes many urinary tract infections, multiple organisms typically sensitive to nearly all antibiotics tested.  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  Supplemental oxygen to keep oxygen saturations greater than or equal to 90%  Nebulized bronchodilators  Good pulmonary toilet, encourage incentive spirometer use    Active Problems:    Dysphagia, with history of aspiration  Has been on a minced and moist diet with mildly thick liquids  SLP evaluate and treat      Urinary retention  Continue chronic indwelling Cardona catheter  Continue PTA Hip-Dale 1 g twice daily      COPD (chronic obstructive pulmonary disease) (H)  See discussion above, continue PTA DuoNeb 3 times daily      Type 2 diabetes mellitus without complication, without long-term current use of insulin (H)  Most recent hemoglobin A1c in our system is 9/24/2023, 6.5%  Recheck hemoglobin A1c  Did not order corrective dose insulin or other hypoglycemics at this time, since he is not on any diabetes medication as outpatient, and pending results of hemoglobin A1c       "Hypertension  Continue PTA metoprolol 12.5 mg twice daily      Chronic HFpEF, not in exacerbation  Continue PTA Lasix 20 mg daily, metoprolol 12.5 mg twice daily, aspirin 81 mg daily      Dyslipidemia  Continue PTA atorvastatin 10 mg daily      Gout  Continue PTA allopurinol      Major depressive disorder, recurrent episode (H24)   Continue PTA Zoloft           Diet:  Dysphagia diet  DVT Prophylaxis: Enoxaparin (Lovenox) SQ  Cardona Catheter: Not present  Lines: None     Cardiac Monitoring: None  Code Status:  CPR okay, no intubation, discussed with patient    Clinically Significant Risk Factors Present on Admission                # Drug Induced Platelet Defect: home medication list includes an antiplatelet medication               # COPD: noted on problem list       Disposition Plan     Medically Ready for Discharge: Anticipated in 2-4 Days      Marcie Echeverria MD  Hospitalist Service  [facility]  Securely message with [employer] (more info)  Text page via [employer] Paging/Directory     ______________________________________________________________________    Chief Complaint   \"It hit me all of a sudden.\"      History of Present Illness   Ron Gilmore is a 83 year old male with numerous medical problems including COPD, on 2 L nasal cannula oxygen, gout, major depression, history of basal cell cancer requiring surgery, urinary retention with chronic Cardona, dysphagia with history of aspiration presents emergency department with fever and shortness of breath.    Patient says he had sudden onset of fever and shortness of breath at 3 PM today.  He has COPD and uses 2 L of oxygen at baseline.  He lives in long-term care at [facility] and called a caregiver reporting chills, weakness and dyspnea.  They called EMS.    Here, labs show normal electrolytes, creatinine of 0.94.  Lactic acid is 2.0.  Procalcitonin is 0.06.  Troponin is 30 ng/L.  VBG as follows 7.3 8/59/21/35.  WBC is 13.0, hemoglobin " 11.8, platelet count 256,000.  COVID/influenza/RSV PCR is negative.  Chest x-ray shows low lung volumes with no acute infiltrates.  EKG shows sinus tachycardia with occasional PVCs.  He was febrile with temperature of 100.5, heart rate 110 bpm, required high flow oxygen to maintain oxygen saturation of greater than 90%.  He is admitted for further evaluation and treatment of sepsis.      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    Unsteady gait     Urinary retention with incomplete bladder emptying     UTI (urinary tract infection)     Vasovagal episode        Past Surgical History   Past Surgical History:   Procedure Laterality Date    APPENDECTOMY OPEN      ARTHROSCOPY SHOULDER ROTATOR CUFF REPAIR      cataracts Bilateral     CHOLECYSTECTOMY      COLONOSCOPY  1986    COLONOSCOPY N/A 5/29/2021    Procedure: COLONOSCOPY;  Surgeon: Kofi Davis MD;  Location:  GI    CYSTOSCOPY  10/19/2011    Procedure:CYSTOSCOPY; CYSTOSCOPY, BLADDER STONE REMOVAL; Surgeon:ROB SAWYER; Location: OR    CYSTOSCOPY, TRANSURETHRAL RESECTION (TUR) PROSTATE, COMBINED N/A 2/21/2018    Procedure: COMBINED CYSTOSCOPY, TRANSURETHRAL RESECTION (TUR) PROSTATE;  COMBINED CYSTOSCOPY, TRANSURETHRAL RESECTION (TUR) PROSTATE ;  Surgeon: Rob Sawyer MD;  Location:  OR    EP LOOP RECORDER IMPLANT N/A 1/20/2020    Procedure: EP Loop Recorder Implant;  Surgeon: Ailin  Evgeny Powers MD;  Location:  HEART CARDI[facility] CATH LAB    ESOPHAGOSCOPY, GASTROSCOPY, DUODENOSCOPY (EGD), COMBINED N/A 5/28/2021    Procedure: ESOPHAGOGASTRODUODENOSCOPY (EGD);  Surgeon: Aurora Waterman MD;  Location:  GI    ESOPHAGOSCOPY, GASTROSCOPY, DUODENOSCOPY (EGD), DILATATION, COMBINED N/A 9/24/2022    Procedure: ESOPHAGOGASTRODUODENOSCOPY, WITH DILATION;  Surgeon: Kofi Davis MD;  Location:  GI    EYE SURGERY      right lid surgery     IR IVC FILTER PLACEMENT  5/24/2021    IR NEPHROSTOMY TUBE PLACEMENT RIGHT  3/9/2021    IR URETERAL STENT PLACEMENT RIGHT  3/16/2021    JOINT REPLACEMENT Right     HIP    KNEE SURGERY Bilateral     LAMINECTOMY LUMBAR ONE LEVEL      LASER HOLMIUM LITHOTRIPSY URETER(S), INSERT STENT, COMBINED Right 4/14/2021    Procedure: CYSTOSCOPY, BLADDER STONE REMOVAL, RIGHT URETEROSCOPY, HOLMIUM LASER LITHOTRIPSY, AND RIGHT STENT REMOVAL, RIGHT RETROGRADE;  Surgeon: Rob Sullivan MD;  Location:  OR    TONSILLECTOMY         Prior to Admission Medications   Prior to Admission Medications   Prescriptions Last Dose Informant Patient Reported? Taking?   Emollient (AMLACTIN ULTRA EX)  at [facility] Yes Yes   Sig: Apply topically daily as needed To feet   Skin Protectants, Misc. (LANTISEPTIC SKIN PROTECTANT EX)  at [facility] Yes Yes   Sig: Externally apply topically 2 times daily as needed Apply a thin film to vincent area/buttocks   Skin Protectants, Misc. (LANTISEPTIC SKIN PROTECTANT EX) 8/20/2024 at 0900 Nursing Home Yes Yes   Sig: Externally apply topically 2 times daily Apply a thin film to vincent area/buttocks   Vitamin D3 (VITAMIN D, CHOLECALCIFEROL,) 25 mcg (1000 units) tablet 8/20/2024 at 0800 Nursing Home Yes Yes   Sig: Take 1 tablet by mouth daily 0800   acetaminophen (TYLENOL) 325 MG tablet  at [facility] Yes Yes   Sig: Take 650 mg by mouth every 4 hours as needed for mild pain as needed for pain/fever Max dose 3000mg/24hr   allopurinol (ZYLOPRIM) 300  MG tablet 8/20/2024 at 0900 [facility] Yes Yes   Sig: Take 300 mg by mouth every morning 0900   ammonium lactate (LAC-HYDRIN) 12 % external lotion 8/20/2024 at 0900 [facility] Yes Yes   Sig: Apply topically daily Apply a thin film to bilateral feet and legs   aspirin (ASA) 81 MG EC tablet 8/20/2024 at 0900 [facility] No Yes   Sig: Take 1 tablet (81 mg) by mouth daily   Patient taking differently: Take 81 mg by mouth daily 0900   atorvastatin (LIPITOR) 10 MG tablet 8/20/2024 at 0900 [facility] Yes Yes   Sig: Take 10 mg by mouth every morning 0900   formoterol (PERFOROMIST) 20 MCG/2ML neb solution 8/20/2024 at 1000 [facility] Yes Yes   Sig: Take 20 mcg by nebulization every 12 hours 1000, 2300   furosemide (LASIX) 20 MG tablet 8/20/2024 at 0900 [facility] Yes Yes   Sig: Take 20 mg by mouth daily 0900   hypromellose (ARTIFICIAL TEARS) 0.5 % SOLN ophthalmic solution 8/20/2024 at 0800 [facility] Yes Yes   Sig: Place 2 drops into both eyes 2 times daily 0800, 2000   hypromellose (ARTIFICIAL TEARS) 0.5 % SOLN ophthalmic solution  at [facility] Yes Yes   Sig: Place 2 drops into both eyes 2 times daily as needed for dry eyes   ipratropium - albuterol 0.5 mg/2.5 mg/3 mL (DUONEB) 0.5-2.5 (3) MG/3ML neb solution  at [facility] Yes Yes   Sig: Take 1 vial by nebulization 3 times daily as needed for shortness of breath, wheezing or cough   ipratropium-albuterol (COMBIVENT RESPIMAT)  MCG/ACT inhaler 8/20/2024 at 1200 [facility] Yes Yes   Sig: Inhale 1 puff into the lungs 4 times daily 0900, 1200 ,1600 ,2000   loperamide (IMODIUM) 2 MG capsule  at [facility] Yes Yes   Sig: Take 2 mg by mouth every 6 hours as needed for diarrhea   methenamine hippurate (HIPREX) 1 g tablet 8/20/2024 at 0900 [facility] Yes Yes   Sig: Take 1 g by mouth 2 times daily 0900, 2000   metoprolol tartrate (LOPRESSOR) 25 MG tablet 8/20/2024 at 0900 [facility] No Yes   Sig: Take 0.5 tablets (12.5 mg) by mouth 2 times  daily   Patient taking differently: Take 12.5 mg by mouth 2 times daily 0900, 2000   pantoprazole (PROTONIX) 40 MG EC tablet 8/20/2024 at 0900 Nursing Home Yes Yes   Sig: Take 40 mg by mouth 2 times daily 0900, 2000   senna-docusate (SENOKOT-S/PERICOLACE) 8.6-50 MG tablet 8/20/2024 at 0900 Nursing Home Yes Yes   Sig: Take 1 tablet by mouth 2 times daily 0900, 2000   senna-docusate (SENOKOT-S/PERICOLACE) 8.6-50 MG tablet  at [facility] Yes Yes   Sig: Take 1 tablet by mouth daily as needed for constipation   sertraline (ZOLOFT) 50 MG tablet 8/20/2024 at 0900 Nursing Home Yes Yes   Sig: Take 50 mg by mouth daily 0900   simethicone (MYLICON) 125 MG chewable tablet  at [facility] Yes Yes   Sig: Take 125 mg by mouth 3 times daily as needed for intestinal gas      Facility-Administered Medications: None           Physical Exam   Vital Signs: Temp: (!) 100.5  F (38.1  C) Temp src: Oral BP: (!) 133/95 Pulse: 112   Resp: 24 SpO2: 95 % O2 Device: High Flow Nasal Cannula (HFNC) Oxygen Delivery: 40 LPM  Weight: 220 lbs 0 oz    Constitutional: Awake, alert, cooperative, no apparent distress  Respiratory: He was mildly tachypneic but able to talk in full sentences without pausing due to breathlessness.  Breath sounds are decreased throughout but lungs are quite clear  Cardiovascular: Tachycardic, regular rate and rhythm  GI: Normal bowel sounds, soft, non-distended, non-tender  Skin/Integumen: Left ear pinna has a large, well-healed surgical defect.  He is pale, with a very mild, doughy, bilateral lower extremity edema  Other: Mood is pleasant and talkative      Medical Decision Making       75 MINUTES SPENT BY ME on the date of service doing chart review, history, exam, documentation & further activities per the note.      Data     I have personally reviewed the following data over the past 24 hrs:    13.0 (H)  \   11.8 (L)   / 256     138 97 (L) 17.2 /  121 (H)   4.6 29 0.94 \     Trop: 30 (H) BNP: 190     Procal:  0.06 CRP: N/A Lactic Acid: 2.0         Imaging results reviewed over the past 24 hrs:   Recent Results (from the past 24 hour(s))   XR Chest Port 1 View    Narrative    EXAM: XR CHEST PORT 1 VIEW  LOCATION: [facility]  DATE: 8/20/2024    INDICATION: Shortness of breath  COMPARISON: 9/20/2023      Impression    IMPRESSION: Low lung volumes. No acute infiltrates.

## 2024-08-21 NOTE — PROGRESS NOTES
SLP Bedside Swallow Evaluation  08/21/24 7863   Appointment Info   Signing Clinician's Name / Credentials (SLP) Nicole Lassiter MA [facility] SLP   General Information   Onset of Illness/Injury or Date of Surgery 08/20/24   Referring Physician Dr. Echeverria   Patient/Family Therapy Goal Statement (SLP) Pt agreed to downgrade of liquids to moderately thick, but stated wishes for no diet downgrade to pureed food.  Goal to return to regular solids per pt.   Pertinent History of Current Problem Per notes: Ron Gilmore is a 83 year old male with numerous medical problems including COPD, on 2 L nasal cannula oxygen, gout, major depression, history of basal cell cancer requiring surgery, urinary retention with chronic Cardona, dysphagia with history of aspiration presents emergency department with fever and shortness of breath.     Principal Problem: Severe sepsis (H) Acute on chronic respiratory failure with hypoxia (H) Clinical history, lab and radiologic workup are consistent with severe sepsis, presumed due to pneumonia, possibly aspiration   General Observations Pt reports a recent increase in swallowing difficulty gradually over time.  Pt reports increased coughing with po intake despite use of general precautions and thickener.  Pt admits he eats too fast.  Pt demonstrated decreased recall of type of thickened liquids used at [facility] - ? mildly thick vs slighlty thick liquids; Pt reports increased coughing with thickened liquids today and recently at [facility].  RN reports pt has been coughing with po intake this am.   Type of Evaluation   Type of Evaluation Swallow Evaluation   Oral Motor   Oral Musculature   (Pt reports gradual increase in drooling on L)   Dentition (Oral Motor)   Dentition (Oral Motor) dental appliance/dentures  (Lower dentures not at [facility])   Cough/Swallow/Gag Reflex (Oral Motor)   Comment, Cough/Swallow/Gag Reflex (Oral Motor) Strong cough on command, congestion noted, pt coughed out phlegm x 1   Vocal  Quality/Secretion Management (Oral Motor)   Comment, Vocal Quality/Secretion Management (Oral Motor) WFL   General Swallowing Observations   Past History of Dysphagia Longstanding hx of dysphagia; Last VFSS 9/22/23 - Video swallow study completed: there was flash penetration of mildly thick liquids due to premature entry with large sips, but no aspiration.  Recommend continue minced and moist diet with mildly thick liquids, patient feeding self small bites/sips, upright at 90 degrees, ensure enough moisture with foods like scrambled eggs (should be a cohesive bite with sauce added as needed), double swallow to clear oropharyngeal residue with each bite and sip.      See notes above re: pt report of increased coughing with po intake recently.   Respiratory Support   (HFNC 30L - saturation levels above 92%)   Current Diet/Method of Nutritional Intake (General Swallowing Observations, NIS) minced and moist (dysphagia mechanically altered) (level 5);mildly thick (nectar-thick) liquids (level 2)   Swallowing Evaluation Clinical swallow evaluation   Clinical Swallow Evaluation   Clinical Swallow Evaluation Textures Trialed mildly thick liquids;moderately thick liquids/liquidized;pureed;minced & moist   Clinical Swallow Eval: Mildly Thick Liquids   Mode of Presentation cup   Volume Presented sip x 1   Oral Phase premature pharyngeal entry   Pharyngeal Phase repeated swallows  (delay suspected)   Diagnostic Statement overt extensive coughing   Clinical Swallow Eval: Moderately Thick Liquids   Mode of Presentation spoon;cup   Volume Presented tsps x 3, sip by cup x 1   Oral Phase premature pharyngeal entry   Pharyngeal Phase repeated swallows  (delay suspected)   Diagnostic Statement no aspiration signs by tsp with mod-max cues/assist, pt distracted and needed cues to swallow at times, no immediate overt coughing after trial by cup   Clinical Swallow Evaluation: Puree Solid Texture Trial   Mode of Presentation, Puree spoon  "  Volume of Puree Presented tsps x 3   Oral Phase, Puree WFL   Pharyngeal Phase, Puree   (delay suspected)   Diagnostic Statement pt distracted and needed cues to swallow at times, no immediate aspiration signs   Clinical Swallow Eval: Minced & Moist   Mode of Presentation spoon   Volume Presented tsps x 2   Oral Phase   (increased mastication time)   Pharyngeal Phase   (delay suspected)   Diagnostic Statement pt distracted and needed cues to swallow at times, no immediate aspiration signs   Esophageal Phase of Swallow   Patient reports or presents with symptoms of esophageal dysphagia Yes   Esophageal comments EGD 9/2022  \"The examined esophagus was normal.  Abnormal motility was noted in the esophagus. The cricopharyngeus was  abnormal. There are extra peristaltic waves in the esophageal body. The  distal esophagus/lower esophageal sphincter is open. A TTS dilator was   passed through the scope. Dilation with a 15-16.5-18 mm balloon dilator  was performed to 18 mm.   Food was found in the lower third of the esophagus.  The entire examined stomach was normal.   The in the duodenum was normal. \"       Pt states he starts coughing more toward the end of meals.  Pt does not report increased GERD symptoms.  Increase congested coughing was noted in conversation after all po trials were completed during today's eval.   Swallowing Recommendations   Diet Consistency Recommendations minced & moist (level 5);moderately thick liquids/liquidized (level 3)   Supervision Level for Intake close supervision needed   Instrumental Assessment Recommendations   (to be determined)   Comment, Swallowing Recommendations By tsp or cup, NO STRAW, sit at 90/as high as tolerated, stay up for 60+ min after eating, hard swallows each bite/sip, slow rate, meds with puree, hold po if respiratory status declines   Clinical Impression   Criteria for Skilled Therapeutic Interventions Met (SLP Eval) Yes, treatment indicated   SLP Diagnosis " Mild-moderate oral-pharyngeal dysphagia, symptoms of esophageal dysphagia   Risks & Benefits of therapy have been explained evaluation/treatment results reviewed;care plan/treatment goals reviewed;risks/benefits reviewed;current/potential barriers reviewed;participants voiced agreement with care plan;participants included;patient   Clinical Impression Comments Pt presents with mild-moderate oral-pharyngeal dysphagia and symptoms of esophageal dysphagia at bedside.  Deficits/risk factors include hx of longstanding dysphagia and COPD, pt report of increased coughing at every meal recently, admit with respiratory failure and possible aspiration pneumonia, HFNC needs, decreased oral/swallow awareness, delayed swallows, need for extra swallows, delayed coughing after po trials, and overt direct cough after mildly thick liquids by cup.  Recommend downgrade of liquids to moderately thick by tsp or cup, minced and moist diet, and reminders for safe swallow strategies/precautions:  No Straw, minced and moist diet and reminders for the following: sit at 90/as high as tolerated, stay up for 60+ min after eating, hard swallows each bite/sip, slow rate, meds with puree, hold po if respiratory status declines.  Plan to continue Tx to assess diet tolerance, train strategies, and trial upgrades as indicated. Will dertermine repeat VFSS needs during the course of Tx.   SLP Total Evaluation Time   Eval: oral/pharyngeal swallow function, clinical swallow Minutes (64364) 15   Interventions   Interventions Quick Adds Swallowing Dysfunction   Swallowing Dysfunction &/or Oral Function for Feeding   Treatment of Swallowing Dysfunction &/or Oral Function for Feeding Minutes (45033) 15   Symptoms Noted During/After Treatment   (Brief SOB after coughing episodes, sats remained above 91%)   Treatment Detail/Skilled Intervention Educated pt regarding current findings, recommendation for downgrade of liquids to mod thick with precautions today  and consideration of a pureed diet. Pt accepted liquid downgrade, but stated wishes to continue minced and moist diet/no downgrade to pureed food.  Educated on strategies to use with po intake, hold po if respiratory status declines.  Pt verbalized understanding.  Mod-max cues were needed to use hard swallows after mod thick liquid by cup sip before continued talking in conversation during Tx.  No immediate aspiration signs noted.   SLP Discharge Planning   SLP Plan assess diet tolerance, train strategies, trial upgrades as indicated (? repeat VFSS needs)   SLP Discharge Recommendation Transitional Care Facility   SLP Rationale for DC Rec Swallow function is below baseline; short term SLP swallow Tx after discharge to maximize safety for a least restrictive diet as indicated   SLP Brief overview of current status  Mild-moderate oral-pharyngeal dysphagia, symptoms of esophageal dysphagia; Rec: downgrade liquids to moderately thick by tsp or cup, No Straw, minced and moist diet and reminders for the following: sit at 90/as high as tolerated, stay up for 60+ min after eating, hard swallows each bite/sip, slow rate, meds with puree, hold po if respiratory status declines   Total Session Time   Total Session Time (sum of timed and untimed services) 30

## 2024-08-21 NOTE — ED NOTES
[facility]  ED Nurse Handoff Report    ED Chief complaint: Shortness of Breath      ED Diagnosis:   Final diagnoses:   Acute on chronic respiratory failure with hypoxia (H)       Code Status: To be determined by admitting provider    Allergies: No Known Allergies    Patient Story: Pt arrived via EMS from [facility] due to increased SOB since 2pm today. Hx of COPD and is on 2lpm of O2 at baseline.   Focused Assessment:    Abnormal Labs Resulted from Time of ED Arrival to Time of ED Departure   BASIC METABOLIC PANEL - Abnormal       Result Value    Sodium 138      Potassium 4.6      Chloride 97 (*)     Carbon Dioxide (CO2) 29      Anion Gap 12      Urea Nitrogen 17.2      Creatinine 0.94      GFR Estimate 81      Calcium 9.1      Glucose 121 (*)    TROPONIN T, HIGH SENSITIVITY - Abnormal    Troponin T, High Sensitivity 30 (*)    CBC WITH PLATELETS AND DIFFERENTIAL - Abnormal    WBC Count 13.0 (*)     RBC Count 4.12 (*)     Hemoglobin 11.8 (*)     Hematocrit 38.7 (*)     MCV 94      MCH 28.6      MCHC 30.5 (*)     RDW 15.5 (*)     Platelet Count 256      % Neutrophils 86      % Lymphocytes 5      % Monocytes 7      % Eosinophils 1      % Basophils 0      % Immature Granulocytes 1      NRBCs per 100 WBC 0      Absolute Neutrophils 11.2 (*)     Absolute Lymphocytes 0.6 (*)     Absolute Monocytes 0.9      Absolute Eosinophils 0.2      Absolute Basophils 0.0      Absolute Immature Granulocytes 0.1      Absolute NRBCs 0.0     ISTAT GASES LACTATE VENOUS POCT - Abnormal    Lactic Acid POCT 2.0      Bicarbonate Venous POCT 35 (*)     O2 Sat, Venous POCT 33 (*)     pCO2 Venous POCT 59 (*)     pH Venous POCT 7.38      pO2 Venous POCT 21 (*)     Base Excess/Deficit (+/-) POCT 8.0 (*)       XR Chest Port 1 View   Final Result   IMPRESSION: Low lung volumes. No acute infiltrates.           Treatments and/or interventions provided: Rocephin, nebs, and solumedrol (see MAR)  Patient's response to treatments  and/or interventions: tolerated well    To be done/followed up on inpatient unit:  see new orders    Does this patient have any cognitive concerns?:  n/a    Activity level - Baseline/Home:  wheelchair  Activity Level - Current:   Unknown    Patient's Preferred language: English   Needed?: No    Isolation: None  Infection: Not Applicable  Patient tested for COVID 19 prior to admission: YES  Bariatric?: Yes    Vital Signs:   Vitals:    08/20/24 1700 08/20/24 1726 08/20/24 1749 08/20/24 1808   BP: (!) 164/73   128/59   Pulse: 108  112    Resp: 29  25    Temp:    (!) 100.5  F (38.1  C)   TempSrc:    Oral   SpO2: 96% 93% 98% 97%   Weight:    99.8 kg (220 lb)       Cardiac Rhythm:Cardiac Rhythm: Sinus tachycardia (HR 110s on monitor. EMS stated HR in 100s en route)           Family Comments: n/a  OBS brochure/video discussed/provided to patient/family: N/A              For the majority of the shift this patient's behavior was Green.   Behavioral interventions performed were n/a.    ED NURSE PHONE NUMBER: [phone]

## 2024-08-21 NOTE — PROGRESS NOTES
[facility]    Medicine Progress Note - Hospitalist Service    Date of Admission:  8/20/2024    Assessment & Plan      Ron Gilmore is a 83 year old male with numerous medical problems including COPD, on 2 L nasal cannula oxygen, gout, major depression, history of basal cell cancer requiring surgery, urinary retention with chronic Cardona, dysphagia with history of aspiration presents emergency department with fever and shortness of breath.      Severe sepsis (H)    Acute on chronic respiratory failure with hypoxia (H)  -- Likely secondary to aspiration pneumonia  -- Started on Unasyn on admission, will continue.  -- Blood cultures obtained, negative to date.  Follow-up final results.  -- Continue supplemental O2 to keep saturations greater than 90  -- Nebulized bronchodilators  -- Encourage spirometry use.  -- SLP for swallow eval      Dysphagia, with history of aspiration  -- Has been on a minced and moist diet with mildly thick liquids  -- SLP evaluate and treat      Urinary retention  -- Continue chronic indwelling Cardona catheter  -- Continue PTA Hip-Dale 1 g twice daily      COPD   -- Does not appear to be in exacerbation  -- Continue PTA DuoNebs 3 times daily.      Type 2 diabetes mellitus without complication, without long-term current use of insulin (H  --A1c 6.5  --Not on any hypoglycemics as an outpatient  --sliding scale insulin  -- Monitor blood glucose.      Hypertension  -- Continue PTA metoprolol 12.5 mg twice daily      Chronic HFpEF, not in exacerbation  -- Continue PTA Lasix 20 mg daily, metoprolol 12.5 mg twice daily, aspirin 81 mg daily      Dyslipidemia  -- Continue PTA atorvastatin 10 mg daily      Gout  -- Continue PTA allopurinol      Major depressive disorder, recurrent episode   -- Continue PTA Zoloft             Diet: Combination Diet Minced and Moist Diet (level 5); Liquidized/Moderately Thick (level 3) (By tsp or cup, NO STRAW, sit at 90/as high as tolerated, stay  up for 60+ min after eating, hard swallows each bite/sip, slow rate, meds with puree, hold po if...    DVT Prophylaxis: Pneumatic Compression Devices  Cardona Catheter: PRESENT, indication: Acute retention or obstruction  Lines: None     Cardiac Monitoring: ACTIVE order. Indication: severe sepsis  Code Status:  Special code    Clinically Significant Risk Factors Present on Admission                # Drug Induced Platelet Defect: home medication list includes an antiplatelet medication          # DMII: A1C = 6.5 % (Ref range: <5.7 %) within past 6 months        # COPD: noted on problem list              Disposition Plan     Medically Ready for Discharge: Anticipated in 2-4 Days             Kristie Arriaga MD  Hospitalist Service  [facility]  Securely message with [employer] (more info)  Text page via [employer] Paging/Directory   ______________________________________________________________________    Interval History   Patient laying in bed, has no complaints at this time.    Physical Exam   Vital Signs: Temp: 98.4  F (36.9  C) Temp src: Oral BP: 114/79 Pulse: 80   Resp: 19 SpO2: 98 % O2 Device: None (Room air) Oxygen Delivery: 30 LPM  Weight: 220 lbs 0 oz    General Appearance: Well appearing for stated age.  Respiratory: CTAB, no rales or ronchi  Cardiovascular: S1, S2 normal, no murmurs  GI: non-tender on palpation, BS present      Medical Decision Making       55 MINUTES SPENT BY ME on the date of service doing chart review, history, exam, documentation & further activities per the note.      Data     I have personally reviewed the following data over the past 24 hrs:    11.6 (H)  \   12.2 (L)   / 222     136 97 (L) 21.7 /  149 (H)   4.5 28 0.89 \     Trop: 30 (H) BNP: 190     TSH: N/A T4: N/A A1C: 6.5 (H)     Procal: 0.06 CRP: N/A Lactic Acid: 2.0         Imaging results reviewed over the past 24 hrs:   Recent Results (from the past 24 hour(s))   XR Chest Port 1 View    Narrative    EXAM: XR CHEST  PORT 1 VIEW  LOCATION: [facility]  DATE: 8/20/2024    INDICATION: Shortness of breath  COMPARISON: 9/20/2023      Impression    IMPRESSION: Low lung volumes. No acute infiltrates.

## 2024-08-21 NOTE — CONSULTS
Care Management Initial Consult    General Information  Assessment completed with: Care Team MemberGardenia  Type of CM/SW Visit: Initial Assessment    Primary Care Provider verified and updated as needed: Yes   Readmission within the last 30 days: no previous admission in last 30 days      Reason for Consult: discharge planning  Advance Care Planning: Advance Care Planning Reviewed: present on chart          Communication Assessment  Patient's communication style: spoken language (English or Bilingual)             Cognitive  Cognitive/Neuro/Behavioral: WDL  Level of Consciousness: alert  Arousal Level: opens eyes spontaneously  Orientation: oriented x 4  Mood/Behavior: calm, cooperative  Best Language: 0 - No aphasia  Speech: clear, logical    Living Environment:   People in home: facility resident     Current living Arrangements: assisted living  Name of Facility: [facility]   Able to return to prior arrangements: yes       Family/Social Support:  Care provided by: other (see comments) (Staff at [facility])  Provides care for: no one  Marital Status:   Children          Description of Support System: Supportive    Support Assessment: Adequate family and caregiver support    Current Resources:   Patient receiving home care services: No     Community Resources:    Equipment currently used at home: grab bar, tub/shower, lift device, hospital bed, wheelchair, power  Supplies currently used at home: Incontinence Supplies, Nutritional Supplements    Employment/Financial:  Employment Status: retired        Financial Concerns:             Does the patient's insurance plan have a 3 day qualifying hospital stay waiver?  No    Lifestyle & Psychosocial Needs:  Social Determinants of Health     Food Insecurity: Not on file   Depression: Not on file   Housing Stability: Not on file   Tobacco Use: Medium Risk (7/15/2024)    Patient History     Smoking Tobacco Use: Former     Smokeless Tobacco Use: Never     Passive  Exposure: Not on file   Financial Resource Strain: Not on file   Alcohol Use: Not on file   Transportation Needs: Not on file   Physical Activity: Not on file   Interpersonal Safety: Not on file   Stress: Not on file   Social Connections: Not on file   Health Literacy: Not on file       Functional Status:  Prior to admission patient needed assistance:   Dependent ADLs:: Bathing, Dressing, Grooming, Incontinence, Positioning, Transfers, Toileting  Dependent IADLs:: Laundry, Meal Preparation, Medication Management, Transportation, Cleaning  Assesssment of Functional Status: At functional baseline    Mental Health Status:  Mental Health Status: No Current Concerns       Chemical Dependency Status:  Chemical Dependency Status: No Current Concerns             Values/Beliefs:  Spiritual, Cultural Beliefs, Yazdanism Practices, Values that affect care: no               Additional Information:  Spoke with patients Step daughter regarding plan of care and discharge plans.   Patient resides at [facility]. Their RN coordinator is Gardenia # [phone]-   Daughter states patient is fully dependent with cares. They use a aleks lift to transfer.  Patient has a motorized w/c  which he has been able to maneuver. AL manges his medications and patient has [facility] as PCP   [facility] [place] is Jenn [phone]  [facility] Care Coordinator is Emeli Deleon  [phone]      Pt has h/o  basal cell cancer requiring surgery, urinary retention with chronic Cardona, dysphagia with history of aspiration .   Patient admitted with Severe sepsis and  acute on chronic respiratory failure with hypoxia .  [facility] for [facility] when stable[facility].     Cassi Steel RN [phone]

## 2024-08-22 ENCOUNTER — APPOINTMENT (OUTPATIENT)
Dept: SPEECH THERAPY | Facility: CLINIC | Age: 84
DRG: 871 | End: 2024-08-22
Payer: MEDICARE

## 2024-08-22 LAB
GLUCOSE BLDC GLUCOMTR-MCNC: 110 MG/DL (ref 70–99)
GLUCOSE BLDC GLUCOMTR-MCNC: 88 MG/DL (ref 70–99)
GLUCOSE BLDC GLUCOMTR-MCNC: 97 MG/DL (ref 70–99)

## 2024-08-22 PROCEDURE — 92526 ORAL FUNCTION THERAPY: CPT | Mod: GN | Performed by: SPEECH-LANGUAGE PATHOLOGIST

## 2024-08-22 PROCEDURE — 250N000009 HC RX 250: Performed by: INTERNAL MEDICINE

## 2024-08-22 PROCEDURE — 94640 AIRWAY INHALATION TREATMENT: CPT | Mod: 76

## 2024-08-22 PROCEDURE — 250N000013 HC RX MED GY IP 250 OP 250 PS 637: Performed by: INTERNAL MEDICINE

## 2024-08-22 PROCEDURE — 99232 SBSQ HOSP IP/OBS MODERATE 35: CPT | Performed by: STUDENT IN AN ORGANIZED HEALTH CARE EDUCATION/TRAINING PROGRAM

## 2024-08-22 PROCEDURE — 94640 AIRWAY INHALATION TREATMENT: CPT

## 2024-08-22 PROCEDURE — G0463 HOSPITAL OUTPT CLINIC VISIT: HCPCS

## 2024-08-22 PROCEDURE — 250N000011 HC RX IP 250 OP 636: Performed by: INTERNAL MEDICINE

## 2024-08-22 PROCEDURE — 999N000157 HC STATISTIC RCP TIME EA 10 MIN

## 2024-08-22 PROCEDURE — 120N000001 HC R&B MED SURG/OB

## 2024-08-22 RX ADMIN — ASPIRIN 81 MG: 81 TABLET, COATED ORAL at 08:14

## 2024-08-22 RX ADMIN — AMPICILLIN SODIUM AND SULBACTAM SODIUM 3 G: 2; 1 INJECTION, POWDER, FOR SOLUTION INTRAMUSCULAR; INTRAVENOUS at 21:40

## 2024-08-22 RX ADMIN — SENNOSIDES AND DOCUSATE SODIUM 1 TABLET: 50; 8.6 TABLET ORAL at 20:25

## 2024-08-22 RX ADMIN — ATORVASTATIN CALCIUM 10 MG: 10 TABLET, FILM COATED ORAL at 08:14

## 2024-08-22 RX ADMIN — AMPICILLIN SODIUM AND SULBACTAM SODIUM 3 G: 2; 1 INJECTION, POWDER, FOR SOLUTION INTRAMUSCULAR; INTRAVENOUS at 09:47

## 2024-08-22 RX ADMIN — FORMOTEROL FUMARATE DIHYDRATE 20 MCG: 20 SOLUTION RESPIRATORY (INHALATION) at 07:53

## 2024-08-22 RX ADMIN — ENOXAPARIN SODIUM 40 MG: 40 INJECTION SUBCUTANEOUS at 21:40

## 2024-08-22 RX ADMIN — AMPICILLIN SODIUM AND SULBACTAM SODIUM 3 G: 2; 1 INJECTION, POWDER, FOR SOLUTION INTRAMUSCULAR; INTRAVENOUS at 15:28

## 2024-08-22 RX ADMIN — FORMOTEROL FUMARATE DIHYDRATE 20 MCG: 20 SOLUTION RESPIRATORY (INHALATION) at 19:36

## 2024-08-22 RX ADMIN — ALLOPURINOL 300 MG: 300 TABLET ORAL at 08:14

## 2024-08-22 RX ADMIN — METOPROLOL TARTRATE 12.5 MG: 25 TABLET, FILM COATED ORAL at 08:14

## 2024-08-22 RX ADMIN — SERTRALINE HYDROCHLORIDE 50 MG: 50 TABLET ORAL at 08:14

## 2024-08-22 RX ADMIN — AMPICILLIN SODIUM AND SULBACTAM SODIUM 3 G: 2; 1 INJECTION, POWDER, FOR SOLUTION INTRAMUSCULAR; INTRAVENOUS at 04:36

## 2024-08-22 RX ADMIN — PANTOPRAZOLE SODIUM 40 MG: 40 TABLET, DELAYED RELEASE ORAL at 20:25

## 2024-08-22 RX ADMIN — PANTOPRAZOLE SODIUM 40 MG: 40 TABLET, DELAYED RELEASE ORAL at 08:14

## 2024-08-22 RX ADMIN — METOPROLOL TARTRATE 12.5 MG: 25 TABLET, FILM COATED ORAL at 20:25

## 2024-08-22 RX ADMIN — METHENAMINE HIPPURATE 1 G: 1 TABLET ORAL at 08:14

## 2024-08-22 RX ADMIN — METHENAMINE HIPPURATE 1 G: 1 TABLET ORAL at 20:33

## 2024-08-22 ASSESSMENT — ACTIVITIES OF DAILY LIVING (ADL)
ADLS_ACUITY_SCORE: 57
ADLS_ACUITY_SCORE: 58
ADLS_ACUITY_SCORE: 57
ADLS_ACUITY_SCORE: 56
ADLS_ACUITY_SCORE: 57
ADLS_ACUITY_SCORE: 57
ADLS_ACUITY_SCORE: 58
ADLS_ACUITY_SCORE: 58
ADLS_ACUITY_SCORE: 57
ADLS_ACUITY_SCORE: 57
ADLS_ACUITY_SCORE: 58
ADLS_ACUITY_SCORE: 57
ADLS_ACUITY_SCORE: 58
ADLS_ACUITY_SCORE: 58
ADLS_ACUITY_SCORE: 57
ADLS_ACUITY_SCORE: 57
ADLS_ACUITY_SCORE: 56
ADLS_ACUITY_SCORE: 58
ADLS_ACUITY_SCORE: 58
ADLS_ACUITY_SCORE: 57
ADLS_ACUITY_SCORE: 57

## 2024-08-22 NOTE — PROGRESS NOTES
[facility]    Medicine Progress Note - Hospitalist Service    Date of Admission:  8/20/2024    Assessment & Plan     Ron Gilmore is a 83 year old male with numerous medical problems including COPD, on 2 L nasal cannula oxygen, gout, major depression, history of basal cell cancer requiring surgery, urinary retention with chronic Cardona, dysphagia with history of aspiration presents emergency department with fever and shortness of breath.       Severe sepsis (H) Improving     Acute on chronic respiratory failure with hypoxia (H)  -- Likely secondary to aspiration pneumonia  -- continue. unasyn  -- Blood cultures obtained, negative to date.  Follow-up final results.  -- Continue supplemental O2 to keep saturations greater than 90  -- Nebulized bronchodilators  -- Encourage spirometry use.  -- SLP for swallow eval  -Can discontinue imc orders  -Down to 2liters oxygen          Dysphagia, with history of aspiration  -- Has been on a minced and moist diet with mildly thick liquids  -- SLP evaluate and treat  Orders Placed This Encounter      Combination Diet Minced and Moist Diet (level 5); Liquidized/Moderately Thick (level 3) (By tsp or cup, NO STRAW, sit at 90/as high as tolerated, stay up for 60+ min after eating, hard swallows each bite/sip, slow rate, meds with puree, hold po if...          Urinary retention  -- Continue chronic indwelling Cardona catheter  -- Continue PTA Hip-Dale 1 g twice daily       COPD   -- Does not appear to be in exacerbation  -- Continue PTA DuoNebs 3 times daily.       Type 2 diabetes mellitus without complication, without long-term current use of insulin (H  --A1c 6.5  --Not on any hypoglycemics as an outpatient  --sliding scale insulin  -- Monitor blood glucose.       Hypertension  -- Continue PTA metoprolol 12.5 mg twice daily       Chronic HFpEF, not in exacerbation  -- Continue PTA Lasix 20 mg daily, metoprolol 12.5 mg twice daily, aspirin 81 mg daily        Dyslipidemia  -- Continue PTA atorvastatin 10 mg daily       Gout  -- Continue PTA allopurinol       Major depressive disorder, recurrent episode   -- Continue PTA Zoloft     Sacrum Pressure Injury and chronic skin changes/discoloration, Stage Unknown ,POA   -Wound care consulted   Sacrum wound(s): Every other day and prn  Cleanse area with saline  Apply Sween cream only to dark/discolored area  Cover with Sacral Mepilex  Follow PIP plan        Diet: Combination Diet Minced and Moist Diet (level 5); Liquidized/Moderately Thick (level 3) (By tsp or cup, NO STRAW, sit at 90/as high as tolerated, stay up for 60+ min after eating, hard swallows each bite/sip, slow rate, meds with puree, hold po if...  Room Service    DVT Prophylaxis: Enoxaparin (Lovenox) SQ  Cardona Catheter: PRESENT, indication: Other (Comment) (chronic Cardona for retention)  Lines: None     Cardiac Monitoring: ACTIVE order. Indication: severe sepsis  Code Status:  No CPR Pre intubation ok    Clinically Significant Risk Factors                           # DMII: A1C = 6.5 % (Ref range: <5.7 %) within past 6 months, PRESENT ON ADMISSION      # COPD: noted on problem list              Disposition Plan     Medically Ready for Discharge: Anticipated Tomorrow             Samanta Ventura MD  Hospitalist Service  [facility]  Securely message with [employer] (more info)  Text page via [employer] Paging/Directory   ______________________________________________________________________    Interval History   Patient seen and examined  Feels well  No chest pain  No shorness of breath   Physical Exam   Vital Signs: Temp: 98.4  F (36.9  C) Temp src: Oral BP: 110/53 Pulse: 68   Resp: 16 SpO2: 96 % O2 Device: Nasal cannula Oxygen Delivery: 2 LPM  Weight: 220 lbs 0 oz    Physical Exam  Cardiovascular:      Rate and Rhythm: Normal rate and regular rhythm.   Pulmonary:      Effort: Pulmonary effort is normal. No respiratory distress.   Abdominal:       General: There is no distension.      Palpations: Abdomen is soft.      Tenderness: There is no abdominal tenderness.          Medical Decision Making       40 MINUTES SPENT BY ME on the date of service doing chart review, history, exam, documentation & further activities per the note.      Data         Imaging results reviewed over the past 24 hrs:   No results found for this or any previous visit (from the past 24 hour(s)).

## 2024-08-22 NOTE — CONSULTS
"[facility] Nurse Inpatient Assessment     Consulted for: \"coccyx\"    Summary: POA nonblanchable & slowly blanchable discoloration to sacrum.     Patient History (according to provider note(s):      \"83 year old male with numerous medical problems including COPD, on 2 L nasal cannula oxygen, gout, major depression, history of basal cell cancer requiring surgery, urinary retention with chronic Cardona, dysphagia with history of aspiration presents emergency department with fever and shortness of breath.     Principal Problem:    Severe sepsis (H)    Acute on chronic respiratory failure with hypoxia (H)  Clinical history, lab and radiologic workup are consistent with severe sepsis, presumed due to pneumonia, possibly aspiration  Admit as inpatient-IMC  Treat with broad-spectrum antibiotics per pneumonia order set, including Unasyn  Follow-up blood cultures, urine cultures.  Reviewed patient's microbiology for the past year which includes many urinary tract infections, multiple organisms typically sensitive to nearly all antibiotics tested.  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  Supplemental oxygen to keep oxygen saturations greater than or equal to 90%  Nebulized bronchodilators  Good pulmonary toilet, encourage incentive spirometer use\"    Assessment:      Areas visualized during today's visit: Focused: and Sacrum/coccyx    Wound location: Sacrum      Last photo: 8/22  Wound due to: Pressure Injury and chronic skin changes/discoloration  Wound history/plan of care: POA nonblanchable & slowly blanchable discoloration to sacrum. Hx of sacral pressure injury after a back surgery several years ago, but patient does not know what stage it was. Discoloration appears chronic.  Wound base: blanchable , non-blanchable, erythema, and dark, dusky, dry, itchy     Palpation of the wound bed: normal      Drainage: none     Description of drainage: none     Measurements (length x width x depth, in cm): " "~ 8  x 13 cm      Tunneling: N/A     Undermining: N/A  Periwound skin: Intact      Color: normal and consistent with surrounding tissue      Temperature: normal   Odor: none  Pain: denies  and during dressing change, none  Pain interventions prior to dressing change: patient tolerated well and slow and gentle cares   Treatment goal: Maintain (prevention of deterioration) and Protection  STATUS: initial assessment  Supplies ordered: gathered, at bedside, supplies stored on unit, discussed with RN, and discussed with patient        Treatment Plan:     Sacrum wound(s): Every other day and prn  Cleanse area with saline  Apply Sween cream only to dark/discolored area  Cover with Sacral Mepilex  Follow PIP plan    Pressure Injury Prevention (PIP) Plan:  If patient is declining pressure injury prevention interventions: Explore reason why and address patient's concerns, Educate on pressure injury risk and prevention intervention(s), If patient is still declining, document \"informed refusal\" , and Ensure Care team is aware ( provider, charge nurse, etc)  Mattress: Follow bed algorithm, reassess daily and order specialty mattress, if indicated.  HOB: Maintain at or below 30 degrees, unless contraindicated  Repositioning in bed: Every 1-2 hours , Left/right positioning; avoid supine, and Raise foot of bed prior to raising head of bed, to reduce patient sliding down (shear)  Heels: Keep elevated off mattress and Pillows under calves  Protective Dressing: Sacral Mepilex for prevention (#[id number]),  especially for the agitated patient   Positioning Equipment:None  Chair positioning: Repositions self: patient to shift weight every 15 minutes, Assist patient to reposition hourly, and Do NOT use a donut for sitting (this increases pressure to smaller area and creates a higher potential for injury)    If patient has a buttock pressure injury, or high risk for PI use chair cushion or SPS.  Moisture Management: Perineal cleansing " /protection: Follow Incontinence Protocol, Avoid brief in bed, Clean and dry skin folds with bathing , and Moisturize dry skin  Under Devices: Inspect skin under all medical devices during skin inspection , Ensure tubes are stabilized without tension, and Ensure patient is not lying on medical devices or equipment when repositioned  Ask provider to discontinue device when no longer needed.       Orders: Written    RECOMMEND PRIMARY TEAM ORDER: None, at this time  Education provided: importance of repositioning, plan of care, wound progress, Moisture management, Hygiene, and Off-loading pressure  Discussed plan of care with: Patient and Nurse  [facility] nurse follow-up plan: weekly  Notify [facility] if wound(s) deteriorate.  Nursing to notify the Provider(s) and re-consult the [facility] Nurse if new skin concern.    DATA:     Current support surface: Standard  Standard Isoflex gel  Containment of urine/stool: Incontinent pad in bed and Indwelling catheter  BMI: Body mass index is 29.84 kg/m .   Active diet order: Orders Placed This Encounter      Combination Diet Minced and Moist Diet (level 5); Liquidized/Moderately Thick (level 3) (By tsp or cup, NO STRAW, sit at 90/as high as tolerated, stay up for 60+ min after eating, hard swallows each bite/sip, slow rate, meds with puree, hold po if...     Output: I/O last 3 completed shifts:  In: 540 [P.O.:540]  Out: 850 [Urine:850]     Labs:   Recent Labs   Lab 08/21/24  0541 08/20/24  1726   HGB 12.2* 11.8*   WBC 11.6* 13.0*   A1C  --  6.5*     Pressure injury risk assessment:   Sensory Perception: 2-->very limited  Moisture: 3-->occasionally moist  Activity: 2-->chairfast  Mobility: 2-->very limited  Nutrition: 3-->adequate  Friction and Shear: 2-->potential problem  Lon Score: 14    Zuleika Lowe RN CWCN  Pager no longer is use, please contact through [employer] group: [facility] Nurse Corrina

## 2024-08-22 NOTE — PLAN OF CARE
Patient Name: Indigo  MRN: [id number]  Date of Admission: 8/20/2024  Reason for Admission: Sepsis  Level of Care: Med/Surg    Resp: 2L of O2.   Telemetry: SR/SB w BBB.  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 AxOx4.  GI/: BM + , chronic chirinos  Skin/Wounds: Foam to coccyx, WOC evaluated today for coccyx.  Lines/Drains: Chirinos, IV, O2  Activity: Repo Q2, baseline wheelchair bound  Abnormal Lab: N/a    Aggression Stop Light: Green          Patient Care Plan: Discontinued IMC status. Speech following for ongoing dysphagia diet. Pt continues to cough infrequently with moderately thick liquids. FELIX continued. Possible discharge back to facility Saturday.

## 2024-08-22 NOTE — PLAN OF CARE
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   Lines/Drains: L PIV SL, chronic Cardona in use  Activity: Turn/repo q2-3h as pt tolerated, wheelchair bound at baseline  Sleep: slept comfortably between cares  Abnormal Labs:  at bedtime, BG ACHS. Awaiting AM labs.    Aggression Stop Light: Green          Patient Care Plan: Denies SOB, infrequent congested cough at times. Takes pills whole w/applesauce. Tolerated moderately thickened liquids overnight. Continue IV abx.      Goal Outcome Evaluation:      Plan of Care Reviewed With: patient    Overall Patient Progress: improving    Outcome Evaluation: VSS on 2L NC, pt uses 2L oxygen at baseline.

## 2024-08-23 VITALS
OXYGEN SATURATION: 91 % | TEMPERATURE: 97.9 F | DIASTOLIC BLOOD PRESSURE: 66 MMHG | HEART RATE: 78 BPM | BODY MASS INDEX: 29.84 KG/M2 | SYSTOLIC BLOOD PRESSURE: 118 MMHG | WEIGHT: 220 LBS | RESPIRATION RATE: 18 BRPM

## 2024-08-23 LAB
ANION GAP SERPL CALCULATED.3IONS-SCNC: 5 MMOL/L (ref 7–15)
BUN SERPL-MCNC: 29.9 MG/DL (ref 8–23)
CALCIUM SERPL-MCNC: 9 MG/DL (ref 8.8–10.4)
CHLORIDE SERPL-SCNC: 102 MMOL/L (ref 98–107)
CREAT SERPL-MCNC: 0.86 MG/DL (ref 0.67–1.17)
EGFRCR SERPLBLD CKD-EPI 2021: 87 ML/MIN/1.73M2
ERYTHROCYTE [DISTWIDTH] IN BLOOD BY AUTOMATED COUNT: 15.5 % (ref 10–15)
GLUCOSE BLDC GLUCOMTR-MCNC: 105 MG/DL (ref 70–99)
GLUCOSE BLDC GLUCOMTR-MCNC: 109 MG/DL (ref 70–99)
GLUCOSE BLDC GLUCOMTR-MCNC: 120 MG/DL (ref 70–99)
GLUCOSE BLDC GLUCOMTR-MCNC: 89 MG/DL (ref 70–99)
GLUCOSE SERPL-MCNC: 105 MG/DL (ref 70–99)
HCO3 SERPL-SCNC: 32 MMOL/L (ref 22–29)
HCT VFR BLD AUTO: 36.5 % (ref 40–53)
HGB BLD-MCNC: 11.2 G/DL (ref 13.3–17.7)
MCH RBC QN AUTO: 29.2 PG (ref 26.5–33)
MCHC RBC AUTO-ENTMCNC: 30.7 G/DL (ref 31.5–36.5)
MCV RBC AUTO: 95 FL (ref 78–100)
PLATELET # BLD AUTO: 214 10E3/UL (ref 150–450)
POTASSIUM SERPL-SCNC: 4.1 MMOL/L (ref 3.4–5.3)
RBC # BLD AUTO: 3.83 10E6/UL (ref 4.4–5.9)
SODIUM SERPL-SCNC: 139 MMOL/L (ref 135–145)
WBC # BLD AUTO: 8.2 10E3/UL (ref 4–11)

## 2024-08-23 PROCEDURE — 80048 BASIC METABOLIC PNL TOTAL CA: CPT | Performed by: STUDENT IN AN ORGANIZED HEALTH CARE EDUCATION/TRAINING PROGRAM

## 2024-08-23 PROCEDURE — 250N000009 HC RX 250: Performed by: INTERNAL MEDICINE

## 2024-08-23 PROCEDURE — 85027 COMPLETE CBC AUTOMATED: CPT | Performed by: STUDENT IN AN ORGANIZED HEALTH CARE EDUCATION/TRAINING PROGRAM

## 2024-08-23 PROCEDURE — 250N000011 HC RX IP 250 OP 636: Performed by: INTERNAL MEDICINE

## 2024-08-23 PROCEDURE — 36415 COLL VENOUS BLD VENIPUNCTURE: CPT | Performed by: STUDENT IN AN ORGANIZED HEALTH CARE EDUCATION/TRAINING PROGRAM

## 2024-08-23 PROCEDURE — 250N000013 HC RX MED GY IP 250 OP 250 PS 637: Performed by: INTERNAL MEDICINE

## 2024-08-23 PROCEDURE — 99239 HOSP IP/OBS DSCHRG MGMT >30: CPT | Performed by: STUDENT IN AN ORGANIZED HEALTH CARE EDUCATION/TRAINING PROGRAM

## 2024-08-23 RX ADMIN — SERTRALINE HYDROCHLORIDE 50 MG: 50 TABLET ORAL at 09:20

## 2024-08-23 RX ADMIN — SENNOSIDES AND DOCUSATE SODIUM 1 TABLET: 50; 8.6 TABLET ORAL at 09:21

## 2024-08-23 RX ADMIN — FORMOTEROL FUMARATE DIHYDRATE 20 MCG: 20 SOLUTION RESPIRATORY (INHALATION) at 07:35

## 2024-08-23 RX ADMIN — ALLOPURINOL 300 MG: 300 TABLET ORAL at 09:20

## 2024-08-23 RX ADMIN — AMPICILLIN SODIUM AND SULBACTAM SODIUM 3 G: 2; 1 INJECTION, POWDER, FOR SOLUTION INTRAMUSCULAR; INTRAVENOUS at 09:56

## 2024-08-23 RX ADMIN — METOPROLOL TARTRATE 12.5 MG: 25 TABLET, FILM COATED ORAL at 09:20

## 2024-08-23 RX ADMIN — AMPICILLIN SODIUM AND SULBACTAM SODIUM 3 G: 2; 1 INJECTION, POWDER, FOR SOLUTION INTRAMUSCULAR; INTRAVENOUS at 05:01

## 2024-08-23 RX ADMIN — ASPIRIN 81 MG: 81 TABLET, COATED ORAL at 09:20

## 2024-08-23 RX ADMIN — METHENAMINE HIPPURATE 1 G: 1 TABLET ORAL at 09:24

## 2024-08-23 RX ADMIN — PANTOPRAZOLE SODIUM 40 MG: 40 TABLET, DELAYED RELEASE ORAL at 09:20

## 2024-08-23 RX ADMIN — ATORVASTATIN CALCIUM 10 MG: 10 TABLET, FILM COATED ORAL at 09:20

## 2024-08-23 ASSESSMENT — ACTIVITIES OF DAILY LIVING (ADL)
ADLS_ACUITY_SCORE: 58
ADLS_ACUITY_SCORE: 57
ADLS_ACUITY_SCORE: 58

## 2024-08-23 NOTE — PLAN OF CARE
Goal Outcome Evaluation:       Patient Name: Indigo  MRN: [id number]  Date of Admission: 8/20/2024  Reason for Admission: asp pneumonia   Level of Care: acute    Vitals:   BP Readings from Last 1 Encounters:   08/23/24 118/66     Pulse Readings from Last 1 Encounters:   08/23/24 78     Wt Readings from Last 1 Encounters:   08/20/24 99.8 kg (220 lb)     Ht Readings from Last 1 Encounters:   06/25/24 1.829 m (6')     Estimated body mass index is 29.84 kg/m  as calculated from the following:    Height as of 6/25/24: 1.829 m (6').    Weight as of this encounter: 99.8 kg (220 lb).  Temp Readings from Last 1 Encounters:   08/23/24 97.9  F (36.6  C) (Oral)       Pain: Pain goal 0 Pain Rating 0 Effective pain medication/regimen not needed  Assessment  Resp: infreq prod cough, LS diminished   Telemetry: SR  Neuro: at baseline and unchanged, L sided weakness, WC bound, AOx4  GI/: chronic chirinos with good UO, BM+  Skin/Wounds: coccyx wound, dressing changed  Lines/Drains: PIV for inter abx  Activity: T/R, lift  Aggression Stop Light: Green          Patient Care Plan: discharging back home today with established home care serices

## 2024-08-23 NOTE — PLAN OF CARE
Goal Outcome Evaluation:    Patient Name: Indigo  MRN: [id number]  Date of Admission: 8/20/2024  Reason for Admission: Sepsis  Level of Care: Med/Surg    Vitals:   BP Readings from Last 1 Encounters:   08/23/24 118/68     Pulse Readings from Last 1 Encounters:   08/23/24 67     Wt Readings from Last 1 Encounters:   08/20/24 99.8 kg (220 lb)     Ht Readings from Last 1 Encounters:   06/25/24 1.829 m (6')     Estimated body mass index is 29.84 kg/m  as calculated from the following:    Height as of 6/25/24: 1.829 m (6').    Weight as of this encounter: 99.8 kg (220 lb).  Temp Readings from Last 1 Encounters:   08/23/24 98.6  F (37  C) (Oral)       Pain: Pain goal 0 Pain Rating 0 Effective pain medication/regimen NA    CV Surgery Patient: No    Assessment    Resp: 2 L NC  Telemetry: SR w BBB  Neuro: left sided weakness from stroke. A&O x 4  GI/: +BM, chronic chirinos  Skin/Wounds: blanchable redness on coccyx  Lines/Drains: L PIV SL  Activity: Wheelchair bound baseline, Q 2 turn/repo  Sleep: good  Abnormal Labs: Hgb=11.2    Aggression Stop Light: Green          Patient Care Plan: continue plan of care

## 2024-08-23 NOTE — PROGRESS NOTES
Care Management Discharge Note    Discharge Date: 08/23/2024       Discharge Disposition: Assisted Living    Discharge Services: None    Discharge DME: Oxygen    Discharge Transportation: health plan transportation    Private pay costs discussed: Not applicable    Does the patient's insurance plan have a 3 day qualifying hospital stay waiver?  No    PAS Confirmation Code:    Patient/family educated on Medicare website which has current facility and service quality ratings:      Education Provided on the Discharge Plan:  yes  Persons Notified of Discharge Plans: LESLEY Varner  Patient/Family in Agreement with the Plan: yes    Additional Information:  Patient discharging today by stretcher [facility] scheduled for 3 pm.  Gardenia BLACK from [facility] notified and Faxed orders to  [phone]    Cassi Steel RN [phone]

## 2024-08-23 NOTE — DISCHARGE INSTRUCTIONS
Sacrum wound(s): Every other day and prn  Cleanse area with saline  Apply Sween cream only to dark/discolored area  Cover with Sacral Mepilex  Follow PIP plan

## 2024-08-24 ENCOUNTER — PATIENT OUTREACH (OUTPATIENT)
Dept: CARE COORDINATION | Facility: CLINIC | Age: 84
End: 2024-08-24
Payer: MEDICARE

## 2024-08-24 NOTE — PROGRESS NOTES
Connected [facility]    Background: Transitional Care Management program identified per system criteria and reviewed by [facility] team for possible outreach.    Assessment: Upon chart review, [facility] Team member will not proceed with patient outreach related to this episode of Transitional Care Management program due to reason below:    Patient has discharged to a Memory Care, Long-term Care, Assisted Living or Group Home where patient is receiving on-site support with their daily cares, including support with hospital follow up plan.    Plan: Transitional Care Management episode addressed appropriately per reason noted above.      Gladys Hoang MA  Connected Care Resource [place], [facility]    *Connected Care Resource Team does NOT follow patient ongoing. Referrals are identified based on internal discharge reports and the outreach is to ensure patient has an understanding of their discharge instructions.

## 2024-08-24 NOTE — PLAN OF CARE
"Speech Language Therapy Discharge Summary    Reason for therapy discharge:    Discharged to halfway    Progress towards therapy goal(s). See goals on Care Plan in [facility] electronic health record for goal details.  Goals not met.  Barriers to achieving goals:   discharge from facility.    Therapy recommendation(s):    Continued therapy is recommended.  Rationale/Recommendations:  \"Swallow function is below baseline; short term SLP swallow Tx after discharge to maximize safety for a least restrictive diet as indicated\".    Per last SLP: \"Progress made in Tx. Rec: continue moderately thick liquids by tsp or cup, No Straw, minced and moist diet and reminders for the following: sit at 90/as high as tolerated, stay up for 60+ min after eating, hard swallows each bite/sip, slow rate, meds with puree, hold po if respiratory status declines \"    *Pt not seen by discharging therapist on this date, note written based on previous treating therapist's notes and recommendations     "

## 2024-08-25 LAB — BACTERIA BLD CULT: NO GROWTH

## 2024-08-26 NOTE — DISCHARGE SUMMARY
[facility]  Hospitalist Discharge Summary      Date of Admission:  8/20/2024  Date of Discharge:  8/23/2024  5:04 PM  Discharging Provider: Samanta Ventura MD  Discharge Service: Hospitalist Service    Discharge Diagnoses        Severe sepsis (H) Improving     Acute on chronic respiratory failure with hypoxia (H)  aspiration pneumonia  Clinically Significant Risk Factors     # DMII: A1C = 6.5 % (Ref range: <5.7 %) within past 6 months       Follow-ups Needed After Discharge   Follow-up Appointments     Follow-up and recommended labs and tests       Follow up with primary care provider, [facility],   within 7 days for hospital follow- up.  The following labs/tests are   recommended: cbc and bmp.          Unresulted Labs Ordered in the Past 30 Days of this Admission       No orders found from 7/21/2024 to 8/21/2024.          Discharge Disposition   Discharged to home  Condition at discharge: Stable    Hospital Course   Ron Gilmore is a 83 year old male with numerous medical problems including COPD, on 2 L nasal cannula oxygen, gout, major depression, history of basal cell cancer requiring surgery, urinary retention with chronic Cardona, dysphagia with history of aspiration presents emergency department with fever and shortness of breath.       Severe sepsis (H) Improving     Acute on chronic respiratory failure with hypoxia (H)  -- Likely secondary to aspiration pneumonia  -- continue. unasyn  -- Blood cultures obtained, negative to date.  Follow-up final results.  -- Continue supplemental O2 to keep saturations greater than 90  -- Nebulized bronchodilators  -- Encourage spirometry use.  -- SLP for swallow eval  -Can discontinue imc orders  -Down to 2liters oxygen which is his baseline  -Discharged on Augmentin         Dysphagia, with history of aspiration  -- Has been on a minced and moist diet with mildly thick liquids  -- SLP evaluate and treat  Orders Placed  This Encounter      Combination Diet Minced and Moist Diet (level 5); Liquidized/Moderately Thick (level 3) (By tsp or cup, NO STRAW, sit at 90/as high as tolerated, stay up for 60+ min after eating, hard swallows each bite/sip, slow rate, meds with puree, hold po if          Urinary retention  -- Continue chronic indwelling Cardona catheter  -- Continue PTA Hip-Dale 1 g twice daily       COPD   -- Does not appear to be in exacerbation  -- Continue PTA DuoNebs 3 times daily.       Type 2 diabetes mellitus without complication, without long-term current use of insulin (H  --A1c 6.5  --Not on any hypoglycemics as an outpatient  ---- Monitor blood glucose.       Hypertension  -- Continue PTA metoprolol 12.5 mg twice daily       Chronic HFpEF, not in exacerbation  -- Continue PTA Lasix 20 mg daily, metoprolol 12.5 mg twice daily, aspirin 81 mg daily       Dyslipidemia  -- Continue PTA atorvastatin 10 mg daily       Gout  -- Continue PTA allopurinol       Major depressive disorder, recurrent episode   -- Continue PTA Zoloft     Sacrum Pressure Injury and chronic skin changes/discoloration, Stage Unknown ,POA   -Wound care consulted   Sacrum wound(s): Every other day and prn  Cleanse area with saline  Apply Sween cream only to dark/discolored area  Cover with Sacral Mepilex  Follow PIP plan    Consultations This Hospital Stay   SPEECH LANGUAGE PATH ADULT IP CONSULT  CARE MANAGEMENT / SOCIAL WORK IP CONSULT  WOUND OSTOMY CONTINENCE NURSE  IP CONSULT    Code Status   Prior    Time Spent on this Encounter   I, Samanta Ventura MD, personally saw the patient today and spent greater than 30 minutes discharging this patient.       Samanta Ventura MD  [address] [place] [place] [address]  Phone: [phone]  ______________________________________________________________________    Physical Exam   Vital Signs:                    Weight: 220 lbs 0 oz  Physical  Exam  Cardiovascular:      Rate and Rhythm: Normal rate and regular rhythm.      Heart sounds: Normal heart sounds.   Pulmonary:      Effort: Pulmonary effort is normal. No respiratory distress.   Abdominal:      General: There is no distension.      Palpations: Abdomen is soft.      Tenderness: There is no abdominal tenderness.   Neurological:      Mental Status: He is alert.             Primary Care Physician   [facility]    Discharge Orders      Home Care Referral      Reason for your hospital stay    Aspiration Pnuemonia     Follow-up and recommended labs and tests     Follow up with primary care provider, [facility], within 7 days for hospital follow- up.  The following labs/tests are recommended: cbc and bmp.     Activity    Your activity upon discharge: activity as tolerated     Oxygen Adult/Peds    Oxygen Documentation  I certify that this patient, Ron Gilmore has been under my care (or a nurse practitioner or physican's assistant working with me). This is the face-to-face encounter for oxygen medical necessity.      At the time of this encounter, I have reviewed the qualifying testing and have determined that supplemental oxygen is reasonable and necessary and is expected to improve the patient's condition in a home setting.         Patient has continued oxygen desaturation due to Chronic Respiratory Failure with Hypoxia J96.11.    If portability is ordered, is the patient mobile within the home? yes    Was this visit performed as a telehealth visit: No     Diet    Follow this diet upon discharge: Current Diet:Orders Placed This Encounter      Room Service      Combination Diet Minced and Moist Diet (level 5); Liquidized/Moderately Thick (level 3) (By tsp or cup, NO STRAW, sit at 90/as high as tolerated, stay up for 60+ min after eating, hard swallows each bite/sip, slow rate, meds with puree, hold po if...    Aspiration precaustions       Significant Results and  Procedures   Most Recent 3 CBC's:  Recent Labs   Lab Test 08/23/24  0648 08/21/24  0541 08/20/24  1726   WBC 8.2 11.6* 13.0*   HGB 11.2* 12.2* 11.8*   MCV 95 95 94    222 256     Most Recent 3 BMP's:  Recent Labs   Lab Test 08/23/24  1242 08/23/24  0848 08/23/24  0648 08/21/24  1320 08/21/24  0541 08/20/24  2205 08/20/24  1726   NA  --   --  139  --  136  --  138   POTASSIUM  --   --  4.1  --  4.5  --  4.6   CHLORIDE  --   --  102  --  97*  --  97*   CO2  --   --  32*  --  28  --  29   BUN  --   --  29.9*  --  21.7  --  17.2   CR  --   --  0.86  --  0.89  --  0.94  0.94   ANIONGAP  --   --  5*  --  11  --  12   RAJ  --   --  9.0  --  9.3  --  9.1   * 89 105*   < > 196*   < > 121*    < > = values in this interval not displayed.   ,   Results for orders placed or performed during the hospital encounter of 08/20/24   XR Chest Port 1 View    Narrative    EXAM: XR CHEST PORT 1 VIEW  LOCATION: [facility]  DATE: 8/20/2024    INDICATION: Shortness of breath  COMPARISON: 9/20/2023      Impression    IMPRESSION: Low lung volumes. No acute infiltrates.       Discharge Medications   Discharge Medication List as of 8/23/2024  3:22 PM        START taking these medications    Details   amoxicillin-clavulanate (AUGMENTIN) 875-125 MG tablet Take 1 tablet by mouth 2 times daily for 5 days., Disp-10 tablet, R-0, E-Prescribe           CONTINUE these medications which have NOT CHANGED    Details   acetaminophen (TYLENOL) 325 MG tablet Take 650 mg by mouth every 4 hours as needed for mild pain as needed for pain/fever Max dose 3000mg/24hr, Historical      allopurinol (ZYLOPRIM) 300 MG tablet Take 300 mg by mouth every morning 0900, Historical      ammonium lactate (LAC-HYDRIN) 12 % external lotion Apply topically daily Apply a thin film to bilateral feet and legsHistorical      aspirin (ASA) 81 MG EC tablet Take 1 tablet (81 mg) by mouth daily, Transitional      atorvastatin (LIPITOR) 10 MG tablet  Take 10 mg by mouth every morning 0900, Historical      Emollient (AMLACTIN ULTRA EX) Apply topically daily as needed To feet, Historical      formoterol (PERFOROMIST) 20 MCG/2ML neb solution Take 20 mcg by nebulization every 12 hours 1000, 2300, Historical      furosemide (LASIX) 20 MG tablet Take 20 mg by mouth daily 0900, Disp-45 tablet, R-0, Historical      !! hypromellose (ARTIFICIAL TEARS) 0.5 % SOLN ophthalmic solution Place 2 drops into both eyes 2 times daily 0800, 2000, Historical      !! hypromellose (ARTIFICIAL TEARS) 0.5 % SOLN ophthalmic solution Place 2 drops into both eyes 2 times daily as needed for dry eyes, Historical      ipratropium - albuterol 0.5 mg/2.5 mg/3 mL (DUONEB) 0.5-2.5 (3) MG/3ML neb solution Take 1 vial by nebulization 3 times daily as needed for shortness of breath, wheezing or cough, Historical      ipratropium-albuterol (COMBIVENT RESPIMAT)  MCG/ACT inhaler Inhale 1 puff into the lungs 4 times daily 0900, 1200 ,1600 ,2000, Historical      loperamide (IMODIUM) 2 MG capsule Take 2 mg by mouth every 6 hours as needed for diarrhea, Historical      methenamine hippurate (HIPREX) 1 g tablet Take 1 g by mouth 2 times daily 0900, 2000, Historical      metoprolol tartrate (LOPRESSOR) 25 MG tablet Take 0.5 tablets (12.5 mg) by mouth 2 times daily, No Print Out      pantoprazole (PROTONIX) 40 MG EC tablet Take 40 mg by mouth 2 times daily 0900, 2000, Historical      !! senna-docusate (SENOKOT-S/PERICOLACE) 8.6-50 MG tablet Take 1 tablet by mouth 2 times daily 0900, 2000, Historical      !! senna-docusate (SENOKOT-S/PERICOLACE) 8.6-50 MG tablet Take 1 tablet by mouth daily as needed for constipation, Historical      sertraline (ZOLOFT) 50 MG tablet Take 50 mg by mouth daily 0900, Historical      simethicone (MYLICON) 125 MG chewable tablet Take 125 mg by mouth 3 times daily as needed for intestinal gas, Historical      !! Skin Protectants, Misc. (LANTISEPTIC SKIN PROTECTANT EX)  Externally apply topically 2 times daily as needed Apply a thin film to vincent area/buttocks, Historical      !! Skin Protectants, Misc. (LANTISEPTIC SKIN PROTECTANT EX) Externally apply topically 2 times daily Apply a thin film to vincent area/buttocks, Historical      Vitamin D3 (VITAMIN D, CHOLECALCIFEROL,) 25 mcg (1000 units) tablet Take 1 tablet by mouth daily 0800, Historical       !! - Potential duplicate medications found. Please discuss with provider.        Allergies   No Known Allergies

## 2024-09-27 ENCOUNTER — HOSPITAL ENCOUNTER (EMERGENCY)
Facility: CLINIC | Age: 84
Discharge: HOME | End: 2024-09-27
Attending: EMERGENCY MEDICINE | Admitting: EMERGENCY MEDICINE
Payer: MEDICARE

## 2024-09-27 VITALS
HEART RATE: 75 BPM | RESPIRATION RATE: 20 BRPM | TEMPERATURE: 98.9 F | OXYGEN SATURATION: 94 % | DIASTOLIC BLOOD PRESSURE: 81 MMHG | SYSTOLIC BLOOD PRESSURE: 155 MMHG

## 2024-09-27 DIAGNOSIS — T83.9XXA DIFFICULT FOLEY CATHETER PLACEMENT (H): ICD-10-CM

## 2024-09-27 DIAGNOSIS — R33.9 URINARY RETENTION WITH INCOMPLETE BLADDER EMPTYING: ICD-10-CM

## 2024-09-27 PROCEDURE — 250N000013 HC RX MED GY IP 250 OP 250 PS 637: Performed by: EMERGENCY MEDICINE

## 2024-09-27 PROCEDURE — 51702 INSERT TEMP BLADDER CATH: CPT

## 2024-09-27 PROCEDURE — 99283 EMERGENCY DEPT VISIT LOW MDM: CPT | Mod: 25

## 2024-09-27 PROCEDURE — 250N000009 HC RX 250: Performed by: EMERGENCY MEDICINE

## 2024-09-27 RX ORDER — LIDOCAINE HYDROCHLORIDE 20 MG/ML
JELLY TOPICAL
Status: DISCONTINUED | OUTPATIENT
Start: 2024-09-27 | End: 2024-09-27 | Stop reason: HOSPADM

## 2024-09-27 RX ORDER — CIPROFLOXACIN 500 MG/1
500 TABLET, FILM COATED ORAL ONCE
Status: COMPLETED | OUTPATIENT
Start: 2024-09-27 | End: 2024-09-27

## 2024-09-27 RX ORDER — LIDOCAINE HYDROCHLORIDE 20 MG/ML
10 JELLY TOPICAL ONCE
Status: COMPLETED | OUTPATIENT
Start: 2024-09-27 | End: 2024-09-27

## 2024-09-27 RX ADMIN — LIDOCAINE HYDROCHLORIDE 10 ML: 20 JELLY TOPICAL at 13:56

## 2024-09-27 RX ADMIN — CIPROFLOXACIN HYDROCHLORIDE 500 MG: 500 TABLET, FILM COATED ORAL at 14:39

## 2024-09-27 ASSESSMENT — COLUMBIA-SUICIDE SEVERITY RATING SCALE - C-SSRS
6. HAVE YOU EVER DONE ANYTHING, STARTED TO DO ANYTHING, OR PREPARED TO DO ANYTHING TO END YOUR LIFE?: NO
1. IN THE PAST MONTH, HAVE YOU WISHED YOU WERE DEAD OR WISHED YOU COULD GO TO SLEEP AND NOT WAKE UP?: NO
2. HAVE YOU ACTUALLY HAD ANY THOUGHTS OF KILLING YOURSELF IN THE PAST MONTH?: NO

## 2024-09-27 ASSESSMENT — ACTIVITIES OF DAILY LIVING (ADL): ADLS_ACUITY_SCORE: 39

## 2024-09-27 NOTE — ED TRIAGE NOTES
Brought in by ambulance from [facility] for the staff being unable to re-insert new urinary catheter.

## 2024-09-27 NOTE — ED NOTES
Bed: ED12  Expected date:   Expected time:   Means of arrival:   Comments:  E1  Cath issues/aleks lift pt  Here now

## 2024-09-27 NOTE — ED PROVIDER NOTES
Emergency Department Note      History of Present Illness     Chief Complaint   Catheter Problem      HPI   Ron Gilmore is a 84 year old male who presents with a cathetar problems. Patient states that his Cardona catheter malfunction today and the staff remove the catheter was trying to replace the catheter but they were unable to replace the catheter, prompting his visit to the emergency department.  He currently complains of suprapubic abdominal pain which feels that he is retaining urine.  Patient is followed by Dr. Victor at [facility]. Patient denies any fever or vomiting.  No recent hematuria.    Independent Historian   None    Review of External Notes   none    Past Medical History     Medical History anBPH  Cataract  Cholelithiasis  COPD  Depression  Diabetes mellitus  Dyshidrotic foot dermatitis  Edema  Gout  HLD  HTN  Infection due to 2019 novel coronavirus  Kidney stones  Cholelithiasis  Lumbago  Lumbar disc displacement without myelopathy  Neuropathy, diabetic  Obesity  Spinal stenosis  Stroke  Urinary retention with incomplete bladder emptying  UTI  Vasovagal episode  SIRS  Vasovagal episode  CAP   Tibia/fibula fracture  Closed tibia fracture  CVA  Metabolic encephalopathy  Severe sepsis  Lactic acidosis  Obstructive right sided ureteral stone resulting in hydronephrosis  Acute respiratory failure with hypoxia  Acute and chronic respiratory failure with hypoxia  Iron deficiency anemia  Hemiparesis  DVT  Gastrointestinal hemorrhage  DNR  Chronic indwelling Cardona catheter     Medications   Allopurinol  Aspirin 81 mg  Atorvastatin  Formoterol  Furosemide  Ipratropium - albuterol  Loperamide   Methenamine hippurate  Metoprolol tartrate  Pantoprazole  Paroxetine  Senna-docusate  Simethicone    Surgical History   Appendectomy  Arthroscopy shoulder and rotator cuff repair  Cataract removal  Cholecystectomy  Colonoscopy  Cystoscopy  EP loop recorder implant  EGD  Eye surgery  IVC filter  placement  Nephrostomy tube placement  Ureteral stent placement right  Joint hip replacement  Knee surgery  Laminectomy lumbar  Laser holmium lithotripsy and stent insertion  Tonsillectomy    Physical Exam     Patient Vitals for the past 24 hrs:   BP Temp Temp src Pulse Resp SpO2   09/27/24 1331 (!) 168/87 98.9  F (37.2  C) Oral 86 20 92 %     Physical Exam  Nursing note and vitals reviewed.  Constitutional:  Appears well-developed and well-nourished.   HENT:   Head:    Atraumatic.   Mouth/Throat:   Oropharynx is clear and moist. No oropharyngeal exudate.   Eyes:    Pupils are equal, round, and reactive to light.   Neck:    Normal range of motion. Neck supple.      No tracheal deviation present. No thyromegaly present.   Cardiovascular:  Normal rate, regular rhythm, no murmur   Pulmonary/Chest: Breath sounds are clear and equal without wheezes or crackles.  Abdominal:   Tenderness and fullness to the suprapubic region with guarding but no rebound.  Musculoskeletal:  Exhibits no edema.   Lymphadenopathy:  No cervical adenopathy.   Neurological:   Alert and oriented to person, place, and time.   Skin:    Skin is warm and dry. No rash noted. No pallor.      Diagnostics   Lab Results   None     Imaging   None     EKG   None     Independent Interpretation   None    ED Course    Medications Administered   Medications   lidocaine (XYLOCAINE) 2 % external gel (has no administration in time range)   ciprofloxacin (CIPRO) tablet 500 mg (has no administration in time range)   lidocaine (XYLOCAINE) 2 % external gel 10 mL (10 mLs Urethral $Given 9/27/24 1356)     Procedures   None      Discussion of Management/ED Course   ED Course as of 09/27/24 1425   Fri Sep 27, 2024   1340 I obtained history and examined the patient as noted above.        Additional Documentation  None    Medical Decision Making / Diagnosis   CMS Diagnoses: None    [facility]   Ron Gilmore is a 84 year old male who arrives to the emergency department for Cardona  catheter placement since the nurse at his facility was unable to replace the catheter after it had malfunction.  Our emergency department nurse was able to replace the catheter there was initially a blood clot which cleared, indicating that the nurse at his facility had caused some trauma.  He was given a dose of Cipro here and discharged with a catheter in place to follow-up with his urologist next week.  He was not having any symptoms concerning for UTI.    Disposition   The patient was discharged.     Diagnosis     ICD-10-CM    1. Urinary retention with incomplete bladder emptying  R33.9       2. Difficult Cardona catheter placement (H)  T83.9XXA          Discharge Medications   New Prescriptions    No medications on file     Scribe Disclosure:  Pierre LAUREN, am serving as a scribe at 2:24 PM on 9/27/2024 to document services personally performed by Maria Elena Bailey MD based on my observations and the provider's statements to me.        Maria Elena Bailey MD  09/27/24 2445

## 2024-10-11 ENCOUNTER — APPOINTMENT (OUTPATIENT)
Dept: CT IMAGING | Facility: CLINIC | Age: 84
DRG: 871 | End: 2024-10-11
Attending: SOCIAL WORKER
Payer: MEDICARE

## 2024-10-11 ENCOUNTER — HOSPITAL ENCOUNTER (INPATIENT)
Facility: CLINIC | Age: 84
LOS: 4 days | Discharge: NURSING, REHAB OR LONG-TERM CARE | DRG: 871 | End: 2024-10-15
Attending: SOCIAL WORKER | Admitting: INTERNAL MEDICINE
Payer: MEDICARE

## 2024-10-11 DIAGNOSIS — Z87.09 HISTORY OF COPD: ICD-10-CM

## 2024-10-11 DIAGNOSIS — J96.11 CHRONIC RESPIRATORY FAILURE WITH HYPOXIA, ON HOME OXYGEN THERAPY (H): ICD-10-CM

## 2024-10-11 DIAGNOSIS — K59.00 CONSTIPATION, UNSPECIFIED CONSTIPATION TYPE: Primary | ICD-10-CM

## 2024-10-11 DIAGNOSIS — Z99.81 CHRONIC RESPIRATORY FAILURE WITH HYPOXIA, ON HOME OXYGEN THERAPY (H): ICD-10-CM

## 2024-10-11 DIAGNOSIS — Z87.01 HISTORY OF ASPIRATION PNEUMONIA: ICD-10-CM

## 2024-10-11 DIAGNOSIS — J18.9 PNEUMONIA OF BOTH LUNGS DUE TO INFECTIOUS ORGANISM, UNSPECIFIED PART OF LUNG: ICD-10-CM

## 2024-10-11 LAB
ANION GAP SERPL CALCULATED.3IONS-SCNC: 9 MMOL/L (ref 7–15)
ATRIAL RATE - MUSE: 105 BPM
BASE EXCESS BLDV CALC-SCNC: 6 MMOL/L (ref -3–3)
BASOPHILS # BLD MANUAL: 0 10E3/UL (ref 0–0.2)
BASOPHILS NFR BLD MANUAL: 0 %
BUN SERPL-MCNC: 21.5 MG/DL (ref 8–23)
CALCIUM SERPL-MCNC: 9.2 MG/DL (ref 8.8–10.4)
CHLORIDE SERPL-SCNC: 97 MMOL/L (ref 98–107)
CREAT SERPL-MCNC: 1.14 MG/DL (ref 0.67–1.17)
D DIMER PPP FEU-MCNC: 1.58 UG/ML FEU (ref 0–0.5)
DIASTOLIC BLOOD PRESSURE - MUSE: NORMAL MMHG
EGFRCR SERPLBLD CKD-EPI 2021: 64 ML/MIN/1.73M2
EOSINOPHIL # BLD MANUAL: 0 10E3/UL (ref 0–0.7)
EOSINOPHIL NFR BLD MANUAL: 0 %
ERYTHROCYTE [DISTWIDTH] IN BLOOD BY AUTOMATED COUNT: 16.7 % (ref 10–15)
FLUAV RNA SPEC QL NAA+PROBE: NEGATIVE
FLUBV RNA RESP QL NAA+PROBE: NEGATIVE
GLUCOSE SERPL-MCNC: 163 MG/DL (ref 70–99)
HCO3 BLDV-SCNC: 33 MMOL/L (ref 21–28)
HCO3 SERPL-SCNC: 30 MMOL/L (ref 22–29)
HCT VFR BLD AUTO: 36.7 % (ref 40–53)
HGB BLD-MCNC: 10.9 G/DL (ref 13.3–17.7)
HOLD SPECIMEN: NORMAL
INTERPRETATION ECG - MUSE: NORMAL
LACTATE BLD-SCNC: 2.1 MMOL/L
LACTATE SERPL-SCNC: 1.9 MMOL/L (ref 0.7–2)
LACTATE SERPL-SCNC: 2 MMOL/L (ref 0.7–2)
LYMPHOCYTES # BLD MANUAL: 1.3 10E3/UL (ref 0.8–5.3)
LYMPHOCYTES NFR BLD MANUAL: 6 %
MCH RBC QN AUTO: 26.1 PG (ref 26.5–33)
MCHC RBC AUTO-ENTMCNC: 29.7 G/DL (ref 31.5–36.5)
MCV RBC AUTO: 88 FL (ref 78–100)
METAMYELOCYTES # BLD MANUAL: 0.6 10E3/UL
METAMYELOCYTES NFR BLD MANUAL: 3 %
MONOCYTES # BLD MANUAL: 0.6 10E3/UL (ref 0–1.3)
MONOCYTES NFR BLD MANUAL: 3 %
NEUTROPHILS # BLD MANUAL: 18.6 10E3/UL (ref 1.6–8.3)
NEUTROPHILS NFR BLD MANUAL: 88 %
NT-PROBNP SERPL-MCNC: 1630 PG/ML (ref 0–1800)
P AXIS - MUSE: 64 DEGREES
PCO2 BLDV: 58 MM HG (ref 40–50)
PH BLDV: 7.36 [PH] (ref 7.32–7.43)
PLAT MORPH BLD: ABNORMAL
PLATELET # BLD AUTO: 247 10E3/UL (ref 150–450)
PO2 BLDV: 26 MM HG (ref 25–47)
POTASSIUM SERPL-SCNC: 4.3 MMOL/L (ref 3.4–5.3)
PR INTERVAL - MUSE: 170 MS
QRS DURATION - MUSE: 126 MS
QT - MUSE: 362 MS
QTC - MUSE: 478 MS
R AXIS - MUSE: -25 DEGREES
RBC # BLD AUTO: 4.17 10E6/UL (ref 4.4–5.9)
RBC MORPH BLD: ABNORMAL
RSV RNA SPEC NAA+PROBE: NEGATIVE
SAO2 % BLDV: 44 % (ref 70–75)
SARS-COV-2 RNA RESP QL NAA+PROBE: NEGATIVE
SODIUM SERPL-SCNC: 136 MMOL/L (ref 135–145)
STOMATOCYTES BLD QL SMEAR: ABNORMAL
SYSTOLIC BLOOD PRESSURE - MUSE: NORMAL MMHG
T AXIS - MUSE: 34 DEGREES
TROPONIN T SERPL HS-MCNC: 28 NG/L
TROPONIN T SERPL HS-MCNC: 29 NG/L
VENTRICULAR RATE- MUSE: 105 BPM
WBC # BLD AUTO: 21.1 10E3/UL (ref 4–11)

## 2024-10-11 PROCEDURE — 36415 COLL VENOUS BLD VENIPUNCTURE: CPT | Performed by: SOCIAL WORKER

## 2024-10-11 PROCEDURE — 81001 URINALYSIS AUTO W/SCOPE: CPT | Performed by: INTERNAL MEDICINE

## 2024-10-11 PROCEDURE — 36415 COLL VENOUS BLD VENIPUNCTURE: CPT

## 2024-10-11 PROCEDURE — 250N000011 HC RX IP 250 OP 636: Performed by: SOCIAL WORKER

## 2024-10-11 PROCEDURE — 84484 ASSAY OF TROPONIN QUANT: CPT | Performed by: SOCIAL WORKER

## 2024-10-11 PROCEDURE — 83605 ASSAY OF LACTIC ACID: CPT

## 2024-10-11 PROCEDURE — 74177 CT ABD & PELVIS W/CONTRAST: CPT | Mod: MG

## 2024-10-11 PROCEDURE — 96365 THER/PROPH/DIAG IV INF INIT: CPT | Mod: 59

## 2024-10-11 PROCEDURE — 87040 BLOOD CULTURE FOR BACTERIA: CPT | Performed by: SOCIAL WORKER

## 2024-10-11 PROCEDURE — 80048 BASIC METABOLIC PNL TOTAL CA: CPT | Performed by: SOCIAL WORKER

## 2024-10-11 PROCEDURE — 250N000011 HC RX IP 250 OP 636: Performed by: INTERNAL MEDICINE

## 2024-10-11 PROCEDURE — 99285 EMERGENCY DEPT VISIT HI MDM: CPT | Mod: 25

## 2024-10-11 PROCEDURE — 93005 ELECTROCARDIOGRAM TRACING: CPT

## 2024-10-11 PROCEDURE — 250N000009 HC RX 250: Performed by: SOCIAL WORKER

## 2024-10-11 PROCEDURE — 85007 BL SMEAR W/DIFF WBC COUNT: CPT | Performed by: SOCIAL WORKER

## 2024-10-11 PROCEDURE — 82803 BLOOD GASES ANY COMBINATION: CPT

## 2024-10-11 PROCEDURE — 85379 FIBRIN DEGRADATION QUANT: CPT | Performed by: SOCIAL WORKER

## 2024-10-11 PROCEDURE — 87186 SC STD MICRODIL/AGAR DIL: CPT | Performed by: INTERNAL MEDICINE

## 2024-10-11 PROCEDURE — 83605 ASSAY OF LACTIC ACID: CPT | Performed by: SOCIAL WORKER

## 2024-10-11 PROCEDURE — 87637 SARSCOV2&INF A&B&RSV AMP PRB: CPT | Performed by: SOCIAL WORKER

## 2024-10-11 PROCEDURE — 83880 ASSAY OF NATRIURETIC PEPTIDE: CPT | Performed by: SOCIAL WORKER

## 2024-10-11 PROCEDURE — 250N000013 HC RX MED GY IP 250 OP 250 PS 637: Performed by: INTERNAL MEDICINE

## 2024-10-11 PROCEDURE — 85014 HEMATOCRIT: CPT | Performed by: SOCIAL WORKER

## 2024-10-11 PROCEDURE — 120N000001 HC R&B MED SURG/OB

## 2024-10-11 PROCEDURE — 99223 1ST HOSP IP/OBS HIGH 75: CPT | Performed by: INTERNAL MEDICINE

## 2024-10-11 RX ORDER — IOPAMIDOL 755 MG/ML
111 INJECTION, SOLUTION INTRAVASCULAR ONCE
Status: COMPLETED | OUTPATIENT
Start: 2024-10-11 | End: 2024-10-11

## 2024-10-11 RX ORDER — PANTOPRAZOLE SODIUM 40 MG/1
40 TABLET, DELAYED RELEASE ORAL 2 TIMES DAILY
Status: DISCONTINUED | OUTPATIENT
Start: 2024-10-11 | End: 2024-10-15 | Stop reason: HOSPADM

## 2024-10-11 RX ORDER — IPRATROPIUM BROMIDE AND ALBUTEROL SULFATE 2.5; .5 MG/3ML; MG/3ML
3 SOLUTION RESPIRATORY (INHALATION)
Status: DISCONTINUED | OUTPATIENT
Start: 2024-10-11 | End: 2024-10-11

## 2024-10-11 RX ORDER — ONDANSETRON 4 MG/1
4 TABLET, ORALLY DISINTEGRATING ORAL EVERY 6 HOURS PRN
Status: DISCONTINUED | OUTPATIENT
Start: 2024-10-11 | End: 2024-10-15 | Stop reason: HOSPADM

## 2024-10-11 RX ORDER — ACETAMINOPHEN 325 MG/1
650 TABLET ORAL EVERY 4 HOURS PRN
Status: DISCONTINUED | OUTPATIENT
Start: 2024-10-11 | End: 2024-10-15 | Stop reason: HOSPADM

## 2024-10-11 RX ORDER — CARBOXYMETHYLCELLULOSE SODIUM 5 MG/ML
2 SOLUTION/ DROPS OPHTHALMIC 2 TIMES DAILY PRN
Status: DISCONTINUED | OUTPATIENT
Start: 2024-10-11 | End: 2024-10-15 | Stop reason: HOSPADM

## 2024-10-11 RX ORDER — IPRATROPIUM BROMIDE AND ALBUTEROL SULFATE 2.5; .5 MG/3ML; MG/3ML
3 SOLUTION RESPIRATORY (INHALATION) ONCE
Status: COMPLETED | OUTPATIENT
Start: 2024-10-11 | End: 2024-10-11

## 2024-10-11 RX ORDER — CARBOXYMETHYLCELLULOSE SODIUM 5 MG/ML
2 SOLUTION/ DROPS OPHTHALMIC 2 TIMES DAILY
Status: DISCONTINUED | OUTPATIENT
Start: 2024-10-11 | End: 2024-10-15 | Stop reason: HOSPADM

## 2024-10-11 RX ORDER — POLYETHYLENE GLYCOL 3350 17 G/17G
17 POWDER, FOR SOLUTION ORAL 2 TIMES DAILY
Status: DISCONTINUED | OUTPATIENT
Start: 2024-10-11 | End: 2024-10-15 | Stop reason: HOSPADM

## 2024-10-11 RX ORDER — LIDOCAINE 40 MG/G
CREAM TOPICAL
Status: DISCONTINUED | OUTPATIENT
Start: 2024-10-11 | End: 2024-10-15 | Stop reason: HOSPADM

## 2024-10-11 RX ORDER — AMOXICILLIN 250 MG
2 CAPSULE ORAL 2 TIMES DAILY
Status: DISCONTINUED | OUTPATIENT
Start: 2024-10-11 | End: 2024-10-15 | Stop reason: HOSPADM

## 2024-10-11 RX ORDER — ENOXAPARIN SODIUM 100 MG/ML
40 INJECTION SUBCUTANEOUS EVERY 24 HOURS
Status: DISCONTINUED | OUTPATIENT
Start: 2024-10-11 | End: 2024-10-15 | Stop reason: HOSPADM

## 2024-10-11 RX ORDER — PIPERACILLIN SODIUM, TAZOBACTAM SODIUM 4; .5 G/20ML; G/20ML
4.5 INJECTION, POWDER, LYOPHILIZED, FOR SOLUTION INTRAVENOUS ONCE
Status: COMPLETED | OUTPATIENT
Start: 2024-10-11 | End: 2024-10-11

## 2024-10-11 RX ORDER — ACETAMINOPHEN 650 MG/1
650 SUPPOSITORY RECTAL EVERY 4 HOURS PRN
Status: DISCONTINUED | OUTPATIENT
Start: 2024-10-11 | End: 2024-10-15 | Stop reason: HOSPADM

## 2024-10-11 RX ORDER — SIMETHICONE 80 MG
80 TABLET,CHEWABLE ORAL 3 TIMES DAILY PRN
Status: DISCONTINUED | OUTPATIENT
Start: 2024-10-11 | End: 2024-10-15 | Stop reason: HOSPADM

## 2024-10-11 RX ORDER — FORMOTEROL FUMARATE DIHYDRATE 20 UG/2ML
20 SOLUTION RESPIRATORY (INHALATION) EVERY 12 HOURS
Status: DISCONTINUED | OUTPATIENT
Start: 2024-10-11 | End: 2024-10-15 | Stop reason: HOSPADM

## 2024-10-11 RX ORDER — AMOXICILLIN 250 MG
2 CAPSULE ORAL 2 TIMES DAILY PRN
Status: DISCONTINUED | OUTPATIENT
Start: 2024-10-11 | End: 2024-10-15 | Stop reason: HOSPADM

## 2024-10-11 RX ORDER — PIPERACILLIN SODIUM, TAZOBACTAM SODIUM 4; .5 G/20ML; G/20ML
4.5 INJECTION, POWDER, LYOPHILIZED, FOR SOLUTION INTRAVENOUS EVERY 6 HOURS
Status: DISCONTINUED | OUTPATIENT
Start: 2024-10-12 | End: 2024-10-14

## 2024-10-11 RX ORDER — ACETAMINOPHEN 500 MG
1000 TABLET ORAL ONCE
Status: DISCONTINUED | OUTPATIENT
Start: 2024-10-11 | End: 2024-10-11

## 2024-10-11 RX ORDER — CALCIUM CARBONATE 500 MG/1
1000 TABLET, CHEWABLE ORAL 4 TIMES DAILY PRN
Status: DISCONTINUED | OUTPATIENT
Start: 2024-10-11 | End: 2024-10-15 | Stop reason: HOSPADM

## 2024-10-11 RX ORDER — FUROSEMIDE 20 MG/1
20 TABLET ORAL DAILY
Status: DISCONTINUED | OUTPATIENT
Start: 2024-10-11 | End: 2024-10-15 | Stop reason: HOSPADM

## 2024-10-11 RX ORDER — ATORVASTATIN CALCIUM 10 MG/1
10 TABLET, FILM COATED ORAL EVERY MORNING
Status: DISCONTINUED | OUTPATIENT
Start: 2024-10-12 | End: 2024-10-15 | Stop reason: HOSPADM

## 2024-10-11 RX ORDER — ONDANSETRON 2 MG/ML
4 INJECTION INTRAMUSCULAR; INTRAVENOUS EVERY 6 HOURS PRN
Status: DISCONTINUED | OUTPATIENT
Start: 2024-10-11 | End: 2024-10-15 | Stop reason: HOSPADM

## 2024-10-11 RX ORDER — ALLOPURINOL 300 MG/1
300 TABLET ORAL EVERY MORNING
Status: DISCONTINUED | OUTPATIENT
Start: 2024-10-12 | End: 2024-10-15 | Stop reason: HOSPADM

## 2024-10-11 RX ORDER — AMOXICILLIN 250 MG
1 CAPSULE ORAL 2 TIMES DAILY PRN
Status: DISCONTINUED | OUTPATIENT
Start: 2024-10-11 | End: 2024-10-15 | Stop reason: HOSPADM

## 2024-10-11 RX ORDER — ASPIRIN 81 MG/1
81 TABLET ORAL DAILY
Status: DISCONTINUED | OUTPATIENT
Start: 2024-10-11 | End: 2024-10-15 | Stop reason: HOSPADM

## 2024-10-11 RX ORDER — IPRATROPIUM BROMIDE AND ALBUTEROL SULFATE 2.5; .5 MG/3ML; MG/3ML
1 SOLUTION RESPIRATORY (INHALATION)
Status: DISCONTINUED | OUTPATIENT
Start: 2024-10-11 | End: 2024-10-15 | Stop reason: HOSPADM

## 2024-10-11 RX ADMIN — PIPERACILLIN AND TAZOBACTAM 4.5 G: 4; .5 INJECTION, POWDER, FOR SOLUTION INTRAVENOUS at 18:00

## 2024-10-11 RX ADMIN — SENNOSIDES AND DOCUSATE SODIUM 2 TABLET: 50; 8.6 TABLET ORAL at 22:17

## 2024-10-11 RX ADMIN — IOPAMIDOL 111 ML: 755 INJECTION, SOLUTION INTRAVENOUS at 16:54

## 2024-10-11 RX ADMIN — METOPROLOL TARTRATE 12.5 MG: 25 TABLET, FILM COATED ORAL at 22:18

## 2024-10-11 RX ADMIN — PANTOPRAZOLE SODIUM 40 MG: 40 TABLET, DELAYED RELEASE ORAL at 22:18

## 2024-10-11 RX ADMIN — IPRATROPIUM BROMIDE AND ALBUTEROL SULFATE 3 ML: .5; 3 SOLUTION RESPIRATORY (INHALATION) at 16:18

## 2024-10-11 RX ADMIN — ENOXAPARIN SODIUM 40 MG: 40 INJECTION SUBCUTANEOUS at 22:20

## 2024-10-11 RX ADMIN — ASPIRIN 81 MG: 81 TABLET, COATED ORAL at 22:18

## 2024-10-11 RX ADMIN — ACETAMINOPHEN 650 MG: 325 TABLET ORAL at 22:17

## 2024-10-11 RX ADMIN — CARBOXYMETHYLCELLULOSE SODIUM 2 DROP: 5 SOLUTION/ DROPS OPHTHALMIC at 22:23

## 2024-10-11 RX ADMIN — SODIUM CHLORIDE 100 ML: 9 INJECTION, SOLUTION INTRAVENOUS at 16:54

## 2024-10-11 ASSESSMENT — ACTIVITIES OF DAILY LIVING (ADL)
ADLS_ACUITY_SCORE: 39

## 2024-10-11 NOTE — ED TRIAGE NOTES
Worsening SOB and chest pain for 4 days. History of CHF. Oxygen saturation down to 92% on 2 L home oxygen, requiring more oxygen today. Duoneb given at SNF.      Triage Assessment (Adult)       Row Name 10/11/24 1442          Triage Assessment    Airway WDL WDL        Respiratory WDL    Respiratory WDL X        Skin Circulation/Temperature WDL    Skin Circulation/Temperature WDL WDL        Cardiac WDL    Cardiac WDL X        Peripheral/Neurovascular WDL    Peripheral Neurovascular WDL WDL        Cognitive/Neuro/Behavioral WDL    Cognitive/Neuro/Behavioral WDL WDL

## 2024-10-11 NOTE — ED NOTES
[facility]  ED Nurse Handoff Report    ED Chief complaint: Chest Pain and Shortness of Breath      ED Diagnosis:   Final diagnoses:   None       Code Status: To be addressed by hospitalist team    Allergies: No Known Allergies    Patient Story: Worsening SOB and chest pain for 4 days. History of CHF, Aspiration PNA. Oxygen saturation down to 92% on 2 L home oxygen, requiring more oxygen today. Mild improvement with duonebs PTA. Contracted L side. W/c bound.    Focused Assessment:  O2 sats maintained around 92% on 2L NC. Patient states typically around 97% on 2L. Lactic elevated, WBC elevated, Troponin elevated, Ddimer elevated.    Treatments and/or interventions provided: Duonebs, O2, IV abx  Patient's response to treatments and/or interventions: Mild temporary improvement from Duoneb.    To be done/followed up on inpatient unit:  Inpatient orders    Does this patient have any cognitive concerns?:  A/O x4    Activity level - Baseline/Home:  Wheelchair  Activity Level - Current:   Wheelchair and Total Care    Patient's Preferred language: English   Needed?: No    Isolation: Contact   Infection: VRE  Patient tested for COVID 19 prior to admission: YES  Bariatric?: No    Vital Signs:   Vitals:    10/11/24 1441 10/11/24 1442 10/11/24 1802   BP: 111/68  113/65   Pulse: 100 97 95   Resp: 18 23    Temp: 98.1  F (36.7  C)     TempSrc: Oral     SpO2: 94% 93% 92%   Weight: 99.8 kg (220 lb)     Height: 1.829 m (6')         For the majority of the shift this patient's behavior was Green.   Behavioral interventions performed were N/A.    ED NURSE PHONE NUMBER: *[phone]

## 2024-10-12 LAB
ALBUMIN UR-MCNC: 50 MG/DL
ANION GAP SERPL CALCULATED.3IONS-SCNC: 9 MMOL/L (ref 7–15)
APPEARANCE UR: CLEAR
BILIRUB UR QL STRIP: NEGATIVE
BUN SERPL-MCNC: 24.8 MG/DL (ref 8–23)
CALCIUM SERPL-MCNC: 9 MG/DL (ref 8.8–10.4)
CHLORIDE SERPL-SCNC: 102 MMOL/L (ref 98–107)
COLOR UR AUTO: YELLOW
CREAT SERPL-MCNC: 1.11 MG/DL (ref 0.67–1.17)
EGFRCR SERPLBLD CKD-EPI 2021: 66 ML/MIN/1.73M2
ERYTHROCYTE [DISTWIDTH] IN BLOOD BY AUTOMATED COUNT: 16.8 % (ref 10–15)
GLUCOSE SERPL-MCNC: 135 MG/DL (ref 70–99)
GLUCOSE UR STRIP-MCNC: NEGATIVE MG/DL
HCO3 SERPL-SCNC: 29 MMOL/L (ref 22–29)
HCT VFR BLD AUTO: 34 % (ref 40–53)
HGB BLD-MCNC: 10 G/DL (ref 13.3–17.7)
HGB UR QL STRIP: NEGATIVE
KETONES UR STRIP-MCNC: NEGATIVE MG/DL
LEUKOCYTE ESTERASE UR QL STRIP: ABNORMAL
MCH RBC QN AUTO: 26.1 PG (ref 26.5–33)
MCHC RBC AUTO-ENTMCNC: 29.4 G/DL (ref 31.5–36.5)
MCV RBC AUTO: 89 FL (ref 78–100)
MUCOUS THREADS #/AREA URNS LPF: PRESENT /LPF
NITRATE UR QL: NEGATIVE
PH UR STRIP: 8 [PH] (ref 5–7)
PLATELET # BLD AUTO: 217 10E3/UL (ref 150–450)
POTASSIUM SERPL-SCNC: 4.3 MMOL/L (ref 3.4–5.3)
RBC # BLD AUTO: 3.83 10E6/UL (ref 4.4–5.9)
RBC URINE: 2 /HPF
SODIUM SERPL-SCNC: 140 MMOL/L (ref 135–145)
SP GR UR STRIP: 1 (ref 1–1.03)
SQUAMOUS EPITHELIAL: <1 /HPF
TRI-PHOS CRY #/AREA URNS HPF: ABNORMAL /HPF
UROBILINOGEN UR STRIP-MCNC: NORMAL MG/DL
WBC # BLD AUTO: 18 10E3/UL (ref 4–11)
WBC URINE: 37 /HPF

## 2024-10-12 PROCEDURE — 999N000157 HC STATISTIC RCP TIME EA 10 MIN

## 2024-10-12 PROCEDURE — 250N000011 HC RX IP 250 OP 636: Performed by: INTERNAL MEDICINE

## 2024-10-12 PROCEDURE — 250N000013 HC RX MED GY IP 250 OP 250 PS 637: Performed by: INTERNAL MEDICINE

## 2024-10-12 PROCEDURE — 250N000009 HC RX 250: Performed by: INTERNAL MEDICINE

## 2024-10-12 PROCEDURE — 36415 COLL VENOUS BLD VENIPUNCTURE: CPT | Performed by: INTERNAL MEDICINE

## 2024-10-12 PROCEDURE — 80048 BASIC METABOLIC PNL TOTAL CA: CPT | Performed by: INTERNAL MEDICINE

## 2024-10-12 PROCEDURE — 94640 AIRWAY INHALATION TREATMENT: CPT | Mod: 76

## 2024-10-12 PROCEDURE — 99232 SBSQ HOSP IP/OBS MODERATE 35: CPT | Performed by: INTERNAL MEDICINE

## 2024-10-12 PROCEDURE — 85014 HEMATOCRIT: CPT | Performed by: INTERNAL MEDICINE

## 2024-10-12 PROCEDURE — 120N000001 HC R&B MED SURG/OB

## 2024-10-12 PROCEDURE — 94640 AIRWAY INHALATION TREATMENT: CPT

## 2024-10-12 RX ADMIN — FORMOTEROL FUMARATE DIHYDRATE 20 MCG: 20 SOLUTION RESPIRATORY (INHALATION) at 20:09

## 2024-10-12 RX ADMIN — METOPROLOL TARTRATE 12.5 MG: 25 TABLET, FILM COATED ORAL at 08:29

## 2024-10-12 RX ADMIN — PIPERACILLIN AND TAZOBACTAM 4.5 G: 4; .5 INJECTION, POWDER, FOR SOLUTION INTRAVENOUS at 17:11

## 2024-10-12 RX ADMIN — POLYETHYLENE GLYCOL 3350 17 G: 17 POWDER, FOR SOLUTION ORAL at 08:28

## 2024-10-12 RX ADMIN — SENNOSIDES AND DOCUSATE SODIUM 2 TABLET: 50; 8.6 TABLET ORAL at 20:33

## 2024-10-12 RX ADMIN — FORMOTEROL FUMARATE DIHYDRATE 20 MCG: 20 SOLUTION RESPIRATORY (INHALATION) at 07:07

## 2024-10-12 RX ADMIN — ASPIRIN 81 MG: 81 TABLET, COATED ORAL at 08:29

## 2024-10-12 RX ADMIN — IPRATROPIUM BROMIDE AND ALBUTEROL SULFATE 3 ML: .5; 3 SOLUTION RESPIRATORY (INHALATION) at 07:07

## 2024-10-12 RX ADMIN — SERTRALINE HYDROCHLORIDE 75 MG: 50 TABLET ORAL at 08:29

## 2024-10-12 RX ADMIN — PIPERACILLIN AND TAZOBACTAM 4.5 G: 4; .5 INJECTION, POWDER, FOR SOLUTION INTRAVENOUS at 00:05

## 2024-10-12 RX ADMIN — METOPROLOL TARTRATE 12.5 MG: 25 TABLET, FILM COATED ORAL at 20:33

## 2024-10-12 RX ADMIN — CARBOXYMETHYLCELLULOSE SODIUM 2 DROP: 5 SOLUTION/ DROPS OPHTHALMIC at 20:33

## 2024-10-12 RX ADMIN — IPRATROPIUM BROMIDE AND ALBUTEROL SULFATE 3 ML: .5; 3 SOLUTION RESPIRATORY (INHALATION) at 20:09

## 2024-10-12 RX ADMIN — PANTOPRAZOLE SODIUM 40 MG: 40 TABLET, DELAYED RELEASE ORAL at 08:29

## 2024-10-12 RX ADMIN — IPRATROPIUM BROMIDE AND ALBUTEROL SULFATE 3 ML: .5; 3 SOLUTION RESPIRATORY (INHALATION) at 10:54

## 2024-10-12 RX ADMIN — PANTOPRAZOLE SODIUM 40 MG: 40 TABLET, DELAYED RELEASE ORAL at 20:33

## 2024-10-12 RX ADMIN — SENNOSIDES AND DOCUSATE SODIUM 2 TABLET: 50; 8.6 TABLET ORAL at 08:29

## 2024-10-12 RX ADMIN — ATORVASTATIN CALCIUM 10 MG: 10 TABLET, FILM COATED ORAL at 08:29

## 2024-10-12 RX ADMIN — ENOXAPARIN SODIUM 40 MG: 40 INJECTION SUBCUTANEOUS at 20:33

## 2024-10-12 RX ADMIN — IPRATROPIUM BROMIDE AND ALBUTEROL SULFATE 3 ML: .5; 3 SOLUTION RESPIRATORY (INHALATION) at 15:17

## 2024-10-12 RX ADMIN — PIPERACILLIN AND TAZOBACTAM 4.5 G: 4; .5 INJECTION, POWDER, FOR SOLUTION INTRAVENOUS at 05:50

## 2024-10-12 RX ADMIN — PIPERACILLIN AND TAZOBACTAM 4.5 G: 4; .5 INJECTION, POWDER, FOR SOLUTION INTRAVENOUS at 11:34

## 2024-10-12 RX ADMIN — ALLOPURINOL 300 MG: 300 TABLET ORAL at 08:28

## 2024-10-12 ASSESSMENT — ACTIVITIES OF DAILY LIVING (ADL)
ADLS_ACUITY_SCORE: 44
ADLS_ACUITY_SCORE: 39
ADLS_ACUITY_SCORE: 44
ADLS_ACUITY_SCORE: 39
ADLS_ACUITY_SCORE: 44
ADLS_ACUITY_SCORE: 44
ADLS_ACUITY_SCORE: 39
ADLS_ACUITY_SCORE: 44
ADLS_ACUITY_SCORE: 44
ADLS_ACUITY_SCORE: 39
ADLS_ACUITY_SCORE: 44
ADLS_ACUITY_SCORE: 39
ADLS_ACUITY_SCORE: 44
ADLS_ACUITY_SCORE: 44
ADLS_ACUITY_SCORE: 39
ADLS_ACUITY_SCORE: 40
ADLS_ACUITY_SCORE: 44
ADLS_ACUITY_SCORE: 39
ADLS_ACUITY_SCORE: 44

## 2024-10-12 NOTE — PLAN OF CARE
Pt A&O x4. On 3L nc, PIV SL. Chronic chirinos in place- patent. Assist x2 w/ lift, turned and repositioned- wheelchair bound at baseline. Tolerating a minced & moist diet w/ midly thick (level). [place] any pain. Contact precaution maintained. Plan of care ongoing.

## 2024-10-12 NOTE — PLAN OF CARE
Goal Outcome Evaluation:         10/12/24 [id number]    Orientation: A/Ox4. Pleasant.   Aggression Stop Light: Green  Activity: A2 lift. Q2h turn/repo as pt allows. Refused T/R at times, provided education.   Diet/BS Checks: minced/moist, mildly thicken. Room service. Boost with each meal. No Straw. Pt asks for ice chips ( tolerating well). Can feed himself-needs tray set-up.   Tele:  N/A  IV Access/Drains: R PIV SL patent.   Pain Management: Denies pain/nausea.   Abnormal VS/Results: VSS on 2-3L via NC ( baseline 2L)- wean off to 2L as tolerated, tachycardic at times.   Bowel/Bladder: Continent of bowel. No BM. On scheduled bowel regimen. Chronic Cardona in place.   Skin/Wounds: scattered bruising. Dry.   Consults: CM, nutrition.   D/C Disposition: pending.   Other Info:      -From [facility].   -Left sided body weakness/shaking from the stroke ( 2020) per pt.

## 2024-10-12 NOTE — H&P
[facility]    History and Physical - Hospitalist Service       Date of Admission:  10/11/2024    Assessment & Plan      Ron Gilmore is an 84 year old male with h/o COPD, chronically on 2 L of oxygen, HTN, CVA, HFpEF, oropharyngeal dysphagia, aspiration pneumonia, chronic urinary retention with chronic Cardona in place, nonambulatory, Terrie lift dependent, resident of assisted living who presents on 10/11/2024 with cough with shortness of breath and generalized weakness    heart rate 101, afebrile, respiratory rate 26, saturation is 93% on 2 L, blood pressure 113/65    CT C PE/A/P showed -   1.  No evidence pulmonary embolus.  2.  New bibasilar airspace disease/wall thickening/opacification, L > R compatible with pneumonia or aspiration.   3.  Mediastinal and hilar adenopathy may be reactive in the setting of infection.   4.  Rectal fecal impaction. Associated wall thickening suspicious for developing stercoral colitis.      Severe sepsis due to bibasilar pneumonia -due to aspiration vs bacterial, L>R  Reactive mediastinal and hilar lymphadenopathy  Oropharyngeal dysphagia  -Reports 2-day history of increased shortness of breath with cough and generalized weakness  -With lactate of 2.1, WBC 21, heart rate 101, respiratory 26-met criteria for severe sepsis   -VBG's showed pH 7.36, pCO2 58, bicarb 33  -COVID-19/influenza/RSV negative  -CT showed bibasilar infiltrate compatible with pneumonia or aspiration along with reactive mediastinal and hilar lymphadenopathy  -Has known history of oropharyngeal dysphagia and is on minced and moist diet with mildly thick liquids at baseline  -Was recently admitted from 8/23-8/25 due to aspiration pneumonia with sepsis  -Obtain sputum culture, blood culture  -Started on IV Zosyn in ER, continue  -Speech therapy evaluation       COPD  Chronic hypoxic and hypercapnic respiratory failure, on 2 L of oxygen at baseline  -Does not seem to have COPD exacerbation.   Oxygen requirements are stable.  Saturations 93% on 2 L  -VBG's showed normal pH of 7.36, mildly elevated PCO2 of 58 which is his baseline  -Continue usual  nebs-formoterol twice daily and DuoNebs every 4 hours while awake  -Continue supplemental oxygen at 2 L/min    Rectal fecal impaction with developing stercoral colitis  -Noted on CT.  Reports having bowel movement 2-3 days ago  -No nausea or vomiting.  No abdominal or rectal pain  -Start on aggressive bowel regimen-administer pink lady enema daily for 3 days, twice daily MiraLAX, Colace-senna 2 tablets twice daily    Chronic urinary retention with chronic Cardona in place  -Change Cardona catheter-obtain UA      Mildly elevated troponin-likely demand ischemia due to pneumonia with severe sepsis  -Troponin mildly elevated at 29 --> 28.  Stable trend.  EKG showed sinus tachycardia with no acute ischemic changes  -No further workup needed    Essential hypertension  -Continue metoprolol 12.5 mg twice daily     Chronic diastolic CHF with preserved EF of 55-60%, echo 4/2023  -Appears euvolemic.  proBNP 1630  -Hold Lasix 20 mg daily     Dyslipidemia  -Continue atorvastatin 10 mg daily     Previous CVA  -Continue aspirin 81 mg daily, atorvastatin 10 mg daily     Depression  -Stable.  Continue sertraline 75 mg daily     Diabetes mellitus type 2, diet controlled, with neuropathy, A1c 6.5  -Not on any medications.      GERD  -Continue Protonix     Chronic gout  -No acute exacerbation.  Continue allopurinol            Diet: Minced & Moist Diet (level 5) Mildly Thick (level 2)  DVT Prophylaxis: Enoxaparin (Lovenox) SQ  Cardona Catheter: Not present  Lines: None     Cardiac Monitoring: None  Code Status: No CPR- Do NOT Intubate    Clinically Significant Risk Factors Present on Admission          # Hypochloremia: Lowest Cl = 97 mmol/L in last 2 days, will monitor as appropriate        # Drug Induced Platelet Defect: home medication list includes an antiplatelet medication        #  Anemia: based on hgb <11      # DMII: A1C = 6.5 % (Ref range: <5.7 %) within past 6 months    # Overweight: Estimated body mass index is 29.84 kg/m  as calculated from the following:    Height as of this encounter: 1.829 m (6').    Weight as of this encounter: 99.8 kg (220 lb).       # COPD: noted on problem list        Disposition Plan         Medically Ready for Discharge: Anticipated in 2-4 Days           July Ray MD  Hospitalist Service  [facility]  Securely message with [employer] (more info)  Text page via [facility] Paging/Directory     ______________________________________________________________________    Chief Complaint     Cough, shortness of breath, generalized weakness    History is obtained from the patient    History of Present Illness     Ron Gilmore is an 84 year old male with h/o COPD, chronically on 2 L of oxygen, HTN, CVA, HFpEF, oropharyngeal dysphagia, aspiration pneumonia, chronic urinary retention with chronic Cardona in place, nonambulatory, Terrie lift dependent, resident of assisted living who presents on 10/11/2024 with cough with shortness of breath and generalized weakness    Patient reports that he started experiencing increasing shortness of breath 1-2 days ago.  He also reports productive cough which is worse than before.  He denies any fever but reports chills.  He presented to ER for evaluation.    On arrival to ER, heart rate 101, afebrile, respiratory rate 26, saturation is 93% on 2 L, blood pressure 113/65    Labs showed lactate of 2.1 that improved to 1.9, WBC 21, hemoglobin 10.9    VBG's showed pH 7.36, pCO2 58, bicarb 33    COVID-19/influenza/RSV negative    Troponin 29, 28.  proBNP 1630      CT C PE/A/P showed -   1.  No evidence pulmonary embolus.  2.  New bibasilar airspace disease/wall thickening/opacification, L > R compatible with pneumonia or aspiration.   3.  Mediastinal and hilar adenopathy may be reactive in the setting of infection.   4.   Rectal fecal impaction. Associated wall thickening suspicious for developing stercoral colitis.      He was administered DuoNeb, IV Zosyn in ER.      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    Unsteady gait     Urinary retention with incomplete bladder emptying     UTI (urinary tract infection)     Vasovagal episode        Past Surgical History   Past Surgical History:   Procedure Laterality Date    APPENDECTOMY OPEN      ARTHROSCOPY SHOULDER ROTATOR CUFF REPAIR      cataracts Bilateral     CHOLECYSTECTOMY      COLONOSCOPY  1986    COLONOSCOPY N/A 5/29/2021    Procedure: COLONOSCOPY;  Surgeon: Kofi Davis MD;  Location:  GI    CYSTOSCOPY  10/19/2011    Procedure:CYSTOSCOPY; CYSTOSCOPY, BLADDER STONE REMOVAL; Surgeon:ROB SAWYER; Location: OR    CYSTOSCOPY, TRANSURETHRAL RESECTION (TUR) PROSTATE, COMBINED N/A 2/21/2018    Procedure: COMBINED CYSTOSCOPY, TRANSURETHRAL RESECTION (TUR) PROSTATE;  COMBINED CYSTOSCOPY, TRANSURETHRAL RESECTION (TUR) PROSTATE ;  Surgeon: Rob Sawyer MD;  Location:  OR    EP LOOP RECORDER IMPLANT N/A 1/20/2020    Procedure: EP Loop Recorder Implant;  Surgeon: Evgeny Parisi MD;  Location:  HEART CARDIAC CATH LAB    ESOPHAGOSCOPY, GASTROSCOPY, DUODENOSCOPY (EGD), COMBINED N/A 5/28/2021    Procedure: ESOPHAGOGASTRODUODENOSCOPY (EGD);  Surgeon: Aurora Waterman MD;  Location:  GI    ESOPHAGOSCOPY,  GASTROSCOPY, DUODENOSCOPY (EGD), DILATATION, COMBINED N/A 9/24/2022    Procedure: ESOPHAGOGASTRODUODENOSCOPY, WITH DILATION;  Surgeon: Kofi Davis MD;  Location:  GI    EYE SURGERY      right lid surgery     IR IVC FILTER PLACEMENT  5/24/2021    IR NEPHROSTOMY TUBE PLACEMENT RIGHT  3/9/2021    IR URETERAL STENT PLACEMENT RIGHT  3/16/2021    JOINT REPLACEMENT Right     HIP    KNEE SURGERY Bilateral     LAMINECTOMY LUMBAR ONE LEVEL      LASER HOLMIUM LITHOTRIPSY URETER(S), INSERT STENT, COMBINED Right 4/14/2021    Procedure: CYSTOSCOPY, BLADDER STONE REMOVAL, RIGHT URETEROSCOPY, HOLMIUM LASER LITHOTRIPSY, AND RIGHT STENT REMOVAL, RIGHT RETROGRADE;  Surgeon: Rob Sullivan MD;  Location:  OR    TONSILLECTOMY         Prior to Admission Medications   Prior to Admission Medications   Prescriptions Last Dose Informant Patient Reported? Taking?   Emollient (AMLACTIN ULTRA EX) prn Nursing Home Yes Yes   Sig: Apply topically daily as needed To feet   Skin Protectants, Misc. (LANTISEPTIC SKIN PROTECTANT EX) prn Nursing Home Yes Yes   Sig: Externally apply topically 2 times daily as needed Apply a thin film to vincent area/buttocks   Skin Protectants, Misc. (LANTISEPTIC SKIN PROTECTANT EX) 10/11/2024 at am Nursing Home Yes Yes   Sig: Externally apply topically 2 times daily Apply a thin film to vincent area/buttocks   Vitamin D3 (VITAMIN D, CHOLECALCIFEROL,) 25 mcg (1000 units) tablet 10/11/2024 at am Nursing Home Yes Yes   Sig: Take 1 tablet by mouth daily 0800   acetaminophen (TYLENOL) 325 MG tablet prn Nursing Home Yes Yes   Sig: Take 650 mg by mouth every 4 hours as needed for mild pain as needed for pain/fever Max dose 3000mg/24hr   allopurinol (ZYLOPRIM) 300 MG tablet 10/11/2024 at am Nursing Home Yes Yes   Sig: Take 300 mg by mouth every morning 0900   ammonium lactate (LAC-HYDRIN) 12 % external lotion 10/11/2024 Nursing Home Yes Yes   Sig: Apply topically daily Apply a thin film to bilateral feet and  legs   aspirin (ASA) 81 MG EC tablet 10/11/2024 at am Nursing Home No Yes   Sig: Take 1 tablet (81 mg) by mouth daily   atorvastatin (LIPITOR) 10 MG tablet 10/11/2024 at am Nursing Home Yes Yes   Sig: Take 10 mg by mouth every morning 0900   formoterol (PERFOROMIST) 20 MCG/2ML neb solution 10/11/2024 at am Nursing Home Yes Yes   Sig: Take 20 mcg by nebulization every 12 hours 1000, 2300   furosemide (LASIX) 20 MG tablet 10/11/2024 at am Nursing Home Yes Yes   Sig: Take 20 mg by mouth daily 0900   hypromellose (ARTIFICIAL TEARS) 0.5 % SOLN ophthalmic solution 10/11/2024 at am Nursing Home Yes Yes   Sig: Place 2 drops into both eyes 2 times daily 0800, 2000   hypromellose (ARTIFICIAL TEARS) 0.5 % SOLN ophthalmic solution prn Nursing Home Yes Yes   Sig: Place 2 drops into both eyes 2 times daily as needed for dry eyes   ipratropium - albuterol 0.5 mg/2.5 mg/3 mL (DUONEB) 0.5-2.5 (3) MG/3ML neb solution prn Nursing Home Yes Yes   Sig: Take 1 vial by nebulization 3 times daily as needed for shortness of breath, wheezing or cough   ipratropium-albuterol (COMBIVENT RESPIMAT)  MCG/ACT inhaler 10/11/2024 Nursing Home Yes Yes   Sig: Inhale 1 puff into the lungs 4 times daily 0900, 1200 ,1600 ,2000   loperamide (IMODIUM) 2 MG capsule prn Nursing Home Yes Yes   Sig: Take 2 mg by mouth every 6 hours as needed for diarrhea   melatonin 3 MG tablet 10/10/2024  Yes Yes   Sig: Take 3 mg by mouth at bedtime.   methenamine hippurate (HIPREX) 1 g tablet 10/11/2024 Nursing Home Yes Yes   Sig: Take 1 g by mouth 2 times daily 0900, 2000   metoprolol tartrate (LOPRESSOR) 25 MG tablet 10/11/2024 Nursing Home No Yes   Sig: Take 0.5 tablets (12.5 mg) by mouth 2 times daily   pantoprazole (PROTONIX) 40 MG EC tablet 10/11/2024 Nursing Home Yes Yes   Sig: Take 40 mg by mouth 2 times daily 0900, 2000   senna-docusate (SENOKOT-S/PERICOLACE) 8.6-50 MG tablet 10/11/2024 Nursing Home Yes Yes   Sig: Take 1 tablet by mouth 2 times daily 0900, 2000    senna-docusate (SENOKOT-S/PERICOLACE) 8.6-50 MG tablet prn Nursing Home Yes Yes   Sig: Take 1 tablet by mouth daily as needed for constipation   sertraline (ZOLOFT) 50 MG tablet 10/11/2024 at am Nursing Home Yes Yes   Sig: Take 75 mg by mouth daily. 0900   simethicone (MYLICON) 125 MG chewable tablet prn Nursing Home Yes Yes   Sig: Take 125 mg by mouth 3 times daily as needed for intestinal gas      Facility-Administered Medications: None           Physical Exam   Vital Signs: Temp: 98.1  F (36.7  C) Temp src: Oral BP: 113/65 Pulse: 101   Resp: 26 SpO2: 93 % O2 Device: Nasal cannula Oxygen Delivery: 2 LPM  Weight: 220 lbs 0 oz    Constitutional - alert, resting in bed, appears comfortable  Head - normocephalic, atraumatic  ENT - normal eye lids and lashes, no conjunctival hyperemia, no icterus, extraocular movements are normal, normal nose, no discharge, moist oral mucosa, no ulcers or exudates, normal external ear  Neck - no thyromegaly or lymphadenopathy.   CV - regular rate and rhythm, no murmurs, 1+ edema  Pulmonary -coarse breath sounds with rhonchi bilaterally   GI - abdomen is soft, non distended, non tender, bowel sounds are present, no organomegaly  Neurological - alert and oriented, normal speech, chronic left-sided weakness  Musculoskeletal - no joint erythema or swelling, ROM is ok          Medical Decision Making       75 MINUTES SPENT BY ME on the date of service doing chart review, history, exam, documentation & further activities per the note.      Data     I have personally reviewed the following data over the past 24 hrs:    21.1 (H)  \   10.9 (L)   / 247     136 97 (L) 21.5 /  163 (H)   4.3 30 (H) 1.14 \     Trop: 28 (H) BNP: 1,630     Procal: N/A CRP: N/A Lactic Acid: 1.9       INR:  N/A PTT:  N/A   D-dimer:  1.58 (H) Fibrinogen:  N/A       Imaging results reviewed over the past 24 hrs:   Recent Results (from the past 24 hour(s))   CT Chest (PE) Abdomen Pelvis w Contrast    Narrative    EXAM:  CT CHEST PE ABDOMEN PELVIS W CONTRAST  LOCATION: [facility]  DATE: 10/11/2024    INDICATION: Cough and shortness of breath, LUQ rib pain with an elevated d dimer  COMPARISON: 5/4/2023  TECHNIQUE: CT chest pulmonary angiogram and routine CT abdomen pelvis with IV contrast. Arterial phase through the chest and venous phase through the abdomen and pelvis. Multiplanar reformats and MIP reconstructions were performed. Dose reduction   techniques were used.   CONTRAST: 111mL ISOVUE 370    FINDINGS:  ANGIOGRAM CHEST: Pulmonary arteries are normal caliber and negative for pulmonary emboli. Thoracic aorta is negative for dissection. No CT evidence of right heart strain.     LUNGS AND PLEURA: New dense left lower lobe consolidation compatible with aspiration or pneumonia. Patchy right lower lobe airspace disease is also new in the interval. Associated right lower lobe bronchial wall thickening and opacification of left lower   lobe bronchi. Underlying changes of diffuse centrilobular emphysema. There are a few scattered calcified granulomas bilaterally..    MEDIASTINUM/AXILLAE: Enlarged paratracheal, prevascular, and bilateral hilar nodes, slightly increased in prominence compared to prior exam. Normal heart size.    CORONARY ARTERY CALCIFICATION: Moderate to severe    HEPATOBILIARY: Cholecystectomy.    PANCREAS: Unremarkable    SPLEEN: Unremarkable    ADRENAL GLANDS: Unremarkable    KIDNEYS/BLADDER: 7 mm left upper pole renal calculus. No hydronephrosis. Cardona catheter within the thick-walled decompressed bladder.    BOWEL: Large volume of fecal material in the rectum with associated wall thickening. No small bowel obstruction.    LYMPH NODES: No significant retroperitoneal adenopathy.    VASCULATURE: IVC filter. Moderate atherosclerotic disease within the aorta and pelvic branches.    PELVIC ORGANS: Mild prostatic hypertrophy.    MUSCULOSKELETAL: Right hip arthroplasty. Advanced degenerative  changes in the left hip. Multilevel spondylosis.      Impression    IMPRESSION:  1.  No evidence pulmonary embolus.  2.  New bibasilar airspace disease] wall thickening/opacification, left greater than right compatible with pneumonia or aspiration. Clinical correlation and follow-up to resolution recommended.  3.  Mediastinal and hilar adenopathy may be reactive in the setting of infection. Attention at follow-up advised.  4.  CT findings consistent with rectal fecal impaction. Associated wall thickening suspicious for developing stercoral colitis.

## 2024-10-12 NOTE — CONSULTS
"CLINICAL NUTRITION SERVICES  -  ASSESSMENT NOTE      Recommendations Ordered by Registered Dietitian (RD):   Ensure Enlive 3x/day with meals (350 kcal, 20 g protein each)      Malnutrition:   Moderate malnutrition          REASON FOR ASSESSMENT  Ron Gilmore is a 84 year old male seen by Registered Dietitian for RN Consult - Supplement request.       NUTRITION HISTORY  Pt reports reduced appetite for the past month, largely relying on oral nutrition supplements alone. He receives Boost 3x/day at living facility and wishes to continue this while admitted. Reports having issues swallowing/choking as well. He suspects weight loss, but unsure how much.       CURRENT NUTRITION ORDERS  Diet Order:     Minced & moist (level 5), with mildly thick (level 2) liquids     Current Intake/Tolerance:  OK intake thus far       NUTRITION FOCUSED PHYSICAL ASSESSMENT FOR DIAGNOSING MALNUTRITION)  Observed:    Subcutaneous fat loss (refer to documentation in Malnutrition section)    Obtained from Chart/Interdisciplinary Team:  Rectal fecal impaction with developing stercoral colitis    ANTHROPOMETRICS  Height: 6' 0\"  Weight: 220 lbs 0 oz (99.8 kg)   Body mass index is 29.84 kg/m .  Weight Status:  Overweight BMI 25-29.9  IBW: 80.9 kg  % IBW: 123  Weight History:    Wt Readings from Last 10 Encounters:   10/11/24 99.8 kg (220 lb)   08/20/24 99.8 kg (220 lb)   06/25/24 102.1 kg (225 lb)   04/28/24 99.8 kg (220 lb)   09/24/23 99.5 kg (219 lb 6.4 oz)   05/06/23 103.9 kg (229 lb 0.9 oz)   04/13/23 102.6 kg (226 lb 3.1 oz)   04/06/23 111.6 kg (246 lb 1.6 oz)   03/01/23 108.9 kg (240 lb)   09/28/22 97.3 kg (214 lb 9.6 oz)         LABS  Labs reviewed    MEDICATIONS  Medications reviewed      ASSESSED NUTRITION NEEDS PER APPROVED PRACTICE GUIDELINES:    Dosing Weight 86 kg (adjusted, based on IBW of 80.9 kg and actual wt of 99.8 kg)   Estimated Energy Needs: 8859-5138 kcals (25-30 Kcal/Kg)  Justification: maintenance  Estimated Protein Needs: "  grams protein (1-1.2 g pro/Kg)  Justification: preservation of lean body mass  Estimated Fluid Needs: 6582-0055  mL (1 mL/Kcal)  Justification: maintenance    MALNUTRITION:  % Weight Loss:  None noted  % Intake:  <75% for >/= 1 month (moderate malnutrition)  Subcutaneous Fat Loss:  None observed  Muscle Loss:  Temporal region mild depletion, Clavicle bone region mild depletion, and Acromion bone region mild depletion  Fluid Retention:  None noted    Malnutrition Diagnosis: Moderate malnutrition  In Context of:  Acute illness or injury  Chronic illness or disease    NUTRITION DIAGNOSIS:  Inadequate oral intake related to reduced appetite as evidenced by estimate eating <75% nutrition needs over the past month.       INTERVENTIONS  Recommendations / Nutrition Prescription  See above    Goals  Pt to consume at least 75% of three nutritionally adequate meals per day (or equivalent via snacks/supplements).       MONITORING AND EVALUATION:  Progress towards goals will be monitored and evaluated per protocol and Practice Guidelines    Maria Luisa Lozano, DELONTE, LD, CNSC \

## 2024-10-12 NOTE — ED PROVIDER NOTES
Emergency Department Note      History of Present Illness     Chief Complaint   Chest Pain and Shortness of Breath      HPI   Ron Gilmore is a 84 year old male with a history of COPD on 2 L of oxygen at home, left-sided deficits in the setting of a previous CVA, indwelling Cardona in the setting of urinary retention, who presents to the emergency department with a chief complaint of shortness breath, cough and left lower rib area pain when he coughs. No CP at rest.  Symptoms began 1.5 days ago and came on slowly.  Denies any hemoptysis.  Cough worsened in the past 2 days.  No orthopnea.  He is not on any anticoagulation.  Reports he has not had anything to eat all night or all day because he felt unwell.  No nausea or vomiting. No new abdominal discomfort.     Independent Historian   None    Review of External Notes   I reviewed the ED visit from 9/27/2024, seen for urinary retention and replacement of Cardona catheter after catheter malfunction.  Discharged after dose of Cipro.    I reviewed the admission from 8/20 - 8/23, admitted for acute on chronic respiratory failure in the setting of likely aspiration pneumonia.    Past Medical History     Medical History and Problem List   Past Medical History:   Diagnosis Date    BPH (benign prostatic hyperplasia)     Cataract     Cholelithiasis     COPD (chronic obstructive pulmonary disease) (H)     Depression     Diabetes mellitus     Dyshidrotic foot dermatitis     Edema     Gout     Hyperlipidemia     Hypertension     Infection due to 2019 novel coronavirus 5/1/2021    Kidney stones     Lumbago     Lumbar disc displacement without myelopathy     Muscle weakness     Neuropathy, diabetic (H)     Obesity     Spinal stenosis     Stroke (H)     Unsteady gait     Urinary retention with incomplete bladder emptying     UTI (urinary tract infection)     Vasovagal episode        Medications   acetaminophen (TYLENOL) 325 MG tablet  allopurinol (ZYLOPRIM) 300 MG tablet  ammonium  lactate (LAC-HYDRIN) 12 % external lotion  aspirin (ASA) 81 MG EC tablet  atorvastatin (LIPITOR) 10 MG tablet  Emollient (AMLACTIN ULTRA EX)  formoterol (PERFOROMIST) 20 MCG/2ML neb solution  furosemide (LASIX) 20 MG tablet  hypromellose (ARTIFICIAL TEARS) 0.5 % SOLN ophthalmic solution  hypromellose (ARTIFICIAL TEARS) 0.5 % SOLN ophthalmic solution  ipratropium - albuterol 0.5 mg/2.5 mg/3 mL (DUONEB) 0.5-2.5 (3) MG/3ML neb solution  ipratropium-albuterol (COMBIVENT RESPIMAT)  MCG/ACT inhaler  loperamide (IMODIUM) 2 MG capsule  methenamine hippurate (HIPREX) 1 g tablet  metoprolol tartrate (LOPRESSOR) 25 MG tablet  pantoprazole (PROTONIX) 40 MG EC tablet  senna-docusate (SENOKOT-S/PERICOLACE) 8.6-50 MG tablet  senna-docusate (SENOKOT-S/PERICOLACE) 8.6-50 MG tablet  sertraline (ZOLOFT) 50 MG tablet  simethicone (MYLICON) 125 MG chewable tablet  Skin Protectants, Misc. (LANTISEPTIC SKIN PROTECTANT EX)  Skin Protectants, Misc. (LANTISEPTIC SKIN PROTECTANT EX)  Vitamin D3 (VITAMIN D, CHOLECALCIFEROL,) 25 mcg (1000 units) tablet        Surgical History   Past Surgical History:   Procedure Laterality Date    APPENDECTOMY OPEN      ARTHROSCOPY SHOULDER ROTATOR CUFF REPAIR      cataracts Bilateral     CHOLECYSTECTOMY      COLONOSCOPY  1986    COLONOSCOPY N/A 5/29/2021    Procedure: COLONOSCOPY;  Surgeon: Kofi Davis MD;  Location:  GI    CYSTOSCOPY  10/19/2011    Procedure:CYSTOSCOPY; CYSTOSCOPY, BLADDER STONE REMOVAL; Surgeon:ROB SAWYER; Location: OR    CYSTOSCOPY, TRANSURETHRAL RESECTION (TUR) PROSTATE, COMBINED N/A 2/21/2018    Procedure: COMBINED CYSTOSCOPY, TRANSURETHRAL RESECTION (TUR) PROSTATE;  COMBINED CYSTOSCOPY, TRANSURETHRAL RESECTION (TUR) PROSTATE ;  Surgeon: Rob Sawyer MD;  Location:  OR    EP LOOP RECORDER IMPLANT N/A 1/20/2020    Procedure: EP Loop Recorder Implant;  Surgeon: Evgeny Parisi MD;  Location: [facility] CARDIAC CATH LAB    ESOPHAGOSCOPY, GASTROSCOPY,  DUODENOSCOPY (EGD), COMBINED N/A 5/28/2021    Procedure: ESOPHAGOGASTRODUODENOSCOPY (EGD);  Surgeon: Aurora Waterman MD;  Location:  GI    ESOPHAGOSCOPY, GASTROSCOPY, DUODENOSCOPY (EGD), DILATATION, COMBINED N/A 9/24/2022    Procedure: ESOPHAGOGASTRODUODENOSCOPY, WITH DILATION;  Surgeon: Kofi Davis MD;  Location:  GI    EYE SURGERY      right lid surgery     IR IVC FILTER PLACEMENT  5/24/2021    IR NEPHROSTOMY TUBE PLACEMENT RIGHT  3/9/2021    IR URETERAL STENT PLACEMENT RIGHT  3/16/2021    JOINT REPLACEMENT Right     HIP    KNEE SURGERY Bilateral     LAMINECTOMY LUMBAR ONE LEVEL      LASER HOLMIUM LITHOTRIPSY URETER(S), INSERT STENT, COMBINED Right 4/14/2021    Procedure: CYSTOSCOPY, BLADDER STONE REMOVAL, RIGHT URETEROSCOPY, HOLMIUM LASER LITHOTRIPSY, AND RIGHT STENT REMOVAL, RIGHT RETROGRADE;  Surgeon: Rob Sullivan MD;  Location:  OR    TONSILLECTOMY         Physical Exam     Patient Vitals for the past 24 hrs:   BP Temp Temp src Pulse Resp SpO2 Height Weight   10/11/24 1802 113/65 -- -- 95 -- 92 % -- --   10/11/24 1442 -- -- -- 97 23 93 % -- --   10/11/24 1441 111/68 98.1  F (36.7  C) Oral 100 18 94 % 1.829 m (6') 99.8 kg (220 lb)     Physical Exam  General: Overall nontoxic-appearing  HEENT: No conjunctival pallor, no scleral icterus   Neuro: Baseline left upper extremity left lower extremity without any movement  CV: Borderline tachycardic rate   Respiratory: Tachypnea present, coarse breath sounds with rhonchi that there are some with coughing, trace expiratory wheeze  Abdomen: Soft, tender to palpation in the left upper quadrant   No suprapubic tenderness  : Cardona catheter in place draining concentrated appearing urine   MSK: No lower extremity swelling or tenderness    Diagnostics     Lab Results   Labs Ordered and Resulted from Time of ED Arrival to Time of ED Departure   BASIC METABOLIC PANEL - Abnormal       Result Value    Sodium 136      Potassium 4.3      Chloride 97 (*)      Carbon Dioxide (CO2) 30 (*)     Anion Gap 9      Urea Nitrogen 21.5      Creatinine 1.14      GFR Estimate 64      Calcium 9.2      Glucose 163 (*)    TROPONIN T, HIGH SENSITIVITY - Abnormal    Troponin T, High Sensitivity 29 (*)    CBC WITH PLATELETS AND DIFFERENTIAL - Abnormal    WBC Count 21.1 (*)     RBC Count 4.17 (*)     Hemoglobin 10.9 (*)     Hematocrit 36.7 (*)     MCV 88      MCH 26.1 (*)     MCHC 29.7 (*)     RDW 16.7 (*)     Platelet Count 247     MANUAL DIFFERENTIAL - Abnormal    % Neutrophils 88      % Lymphocytes 6      % Monocytes 3      % Eosinophils 0      % Basophils 0      % Metamyelocytes 3      Absolute Neutrophils 18.6 (*)     Absolute Lymphocytes 1.3      Absolute Monocytes 0.6      Absolute Eosinophils 0.0      Absolute Basophils 0.0      Absolute Metamyelocytes 0.6 (*)     RBC Morphology Confirmed RBC Indices      Platelet Assessment        Value: Automated Count Confirmed. Platelet morphology is normal.    Stomatocytes Moderate (*)    D DIMER QUANTITATIVE - Abnormal    D-Dimer Quantitative 1.58 (*)    ISTAT GASES LACTATE VENOUS POCT - Abnormal    Lactic Acid POCT 2.1 (*)     Bicarbonate Venous POCT 33 (*)     O2 Sat, Venous POCT 44 (*)     pCO2 Venous POCT 58 (*)     pH Venous POCT 7.36      pO2 Venous POCT 26      Base Excess/Deficit (+/-) POCT 6.0 (*)    TROPONIN T, HIGH SENSITIVITY - Abnormal    Troponin T, High Sensitivity 28 (*)    NT PROBNP INPATIENT - Normal    N terminal Pro BNP Inpatient 1,630     LACTIC ACID WHOLE BLOOD WITH 1X REPEAT IN 2 HR WHEN >2 - Normal    Lactic Acid, Initial 2.0     INFLUENZA A/B, RSV, & SARS-COV2 PCR - Normal    Influenza A PCR Negative      Influenza B PCR Negative      RSV PCR Negative      SARS CoV2 PCR Negative     LACTIC ACID WHOLE BLOOD - Normal    Lactic Acid 1.9     BLOOD CULTURE       Imaging   CT Chest (PE) Abdomen Pelvis w Contrast   Final Result   IMPRESSION:   1.  No evidence pulmonary embolus.   2.  New bibasilar airspace disease] wall  thickening/opacification, left greater than right compatible with pneumonia or aspiration. Clinical correlation and follow-up to resolution recommended.   3.  Mediastinal and hilar adenopathy may be reactive in the setting of infection. Attention at follow-up advised.   4.  CT findings consistent with rectal fecal impaction. Associated wall thickening suspicious for developing stercoral colitis.      XR Chest 2 Views    (Results Pending)       EKG   EKG 2011  Sinus tachycardia with a right bundle branch block, overall appears unchanged from the EKG from August 20, 2024  Rate 105   QTc 478    Independent Interpretation   None    ED Course      Medications Administered   Medications   sodium chloride (PF) 0.9% PF flush 3 mL (has no administration in time range)   sodium chloride (PF) 0.9% PF flush 3 mL (3 mLs Intracatheter $Given 10/11/24 1536)   ipratropium - albuterol 0.5 mg/2.5 mg/3 mL (DUONEB) neb solution 3 mL (has no administration in time range)   ipratropium - albuterol 0.5 mg/2.5 mg/3 mL (DUONEB) neb solution 3 mL (3 mLs Nebulization $Given 10/11/24 1618)   piperacillin-tazobactam (ZOSYN) 4.5 g vial to attach to  mL bag (4.5 g Intravenous $New Bag 10/11/24 1800)   iopamidol (ISOVUE-370) solution 111 mL (111 mLs Intravenous $Given 10/11/24 1654)   Saline Flush - CT (100 mLs Intravenous $Given 10/11/24 1654)       Procedures   Procedures     Discussion of Management   Admitting Hospitalist, Dr. Sierra    ED Course   ED Course as of 10/11/24 2020   Fri Oct 11, 2024   9019 I assessed and examined patient        Additional Documentation  None    Medical Decision Making / Diagnosis     CMS Diagnoses: The patient has signs of sepsis   Sepsis ED evaluation   The patient has signs of sepsis as evidenced by:  1. Presence of 2 SIRS criteria, suspected infection, AND  2. Organ dysfunction: Lactic Acidosis with value >2.0 due to infection    Time zero:  1534  on 10/11/24 as this was the time when   "lactate resulted, raising suspicion for bacterial infection and Lactate was resulted and the level was greater than 2.    Note: Due to a national blood culture bottle shortage, reduced blood cultures may have been drawn on this patient.    Lactic Acid Results:  Recent Labs   Lab Test 10/11/24  1800 10/11/24  1534 10/11/24  1532   LACT 1.9 2.1* 2.0       3 Hour Bundle 6 Hour Bundle (Reassessment)   Blood Cultures before IV Antibiotics: Yes  Antibiotics given: see below  Prehospital fluid volume (mL):                     Total fluids given (ED +Pre-hospital):  The patient responded to a lesser volume of IV fluids. The initial volume ordered was 100 mL.    Repeat Lactic Acid Level: Ordered by reflex for 2 hours after initial lactic acid collection.  Vasopressors: MAP>65 after initial IVF bolus, will continue to monitor fluid status and vital signs.  Repeat perfusion exam: I attest to having performed a repeat sepsis exam and assessment of perfusion at  1810 .   BMI Readings from Last 1 Encounters:   10/11/24 29.84 kg/m        Anti-infectives (From admission through now)      Start     Dose/Rate Route Frequency Ordered Stop    10/11/24 1620  piperacillin-tazobactam (ZOSYN) 4.5 g vial to attach to  mL bag        Note to Pharmacy: For [facility], [facility] and [facility]: For Zosyn-naive patients, use the \"Zosyn initial dose + extended infusion\" order panel.    4.5 g  over 30 Minutes Intravenous ONCE 10/11/24 1619 10/11/24 1830                MIPS   CT for PE was ordered because the patient had an abnormal d-dimer.    LIZANDRO Gilmore is a 84 year old male with above medical history who presents to the emergency department chief complaint of cough shortness of breath.  On examination, patient tachypneic however able speak in full sentences. Did not feel that patient required NIPPV. D-dimer elevated and CT PE showed no PE however did show blt consolidation. With leukocytosis to 21, cough, consolidations, felt picture overall more " consistent with pneumonia rather than HF exacerbation. No lower extremity edema furthermore and no orthopnea. Pt Pco2 c/w baseline and trop also c/w baseline, EKG without acute ischemic signs. Given zosyn with his history of aspiration pneumonia as well as duoneb and patient felt much improved. Discussed with hospitalist Dr Ray who kindly accepted for admission.     Disposition   The patient was admitted to the hospital.     Diagnosis     ICD-10-CM    1. Pneumonia of both lungs due to infectious organism, unspecified part of lung  J18.9       2. History of COPD  Z87.09       3. History of aspiration pneumonia  Z87.01       4. Chronic respiratory failure with hypoxia, on home oxygen therapy (H)  J96.11     Z99.81            Discharge Medications   New Prescriptions    No medications on file         MD Ward Sam Connie, MD  10/11/24 6765

## 2024-10-12 NOTE — PROGRESS NOTES
RECEIVING UNIT ED HANDOFF REVIEW    ED Nurse Handoff Report was reviewed by: Sarah Gregorio RN on October 11, 2024 at 8:07 PM

## 2024-10-12 NOTE — PHARMACY-ADMISSION MEDICATION HISTORY
Pharmacist Admission Medication History    Admission medication history is complete. The information provided in this note is only as accurate as the sources available at the time of the update.    Information Source(s): Facility ([facility] medication list/MAR via n/a    Pertinent Information:   -- Med list from [facility] ([phone]).  RN there reported that they do not have a MAR to share but reported that pt has all of his medications this morning.    Changes made to PTA medication list:  Added: None  Deleted: None  Changed: None    Allergies reviewed with patient and updates made in EHR: no    Medication History Completed By: Hoda Wang PharmD 10/11/2024 7:18 PM    PTA Med List   Medication Sig Last Dose    acetaminophen (TYLENOL) 325 MG tablet Take 650 mg by mouth every 4 hours as needed for mild pain as needed for pain/fever Max dose 3000mg/24hr prn    allopurinol (ZYLOPRIM) 300 MG tablet Take 300 mg by mouth every morning 0900 10/11/2024 at am    ammonium lactate (LAC-HYDRIN) 12 % external lotion Apply topically daily Apply a thin film to bilateral feet and legs 10/11/2024    aspirin (ASA) 81 MG EC tablet Take 1 tablet (81 mg) by mouth daily 10/11/2024 at am    atorvastatin (LIPITOR) 10 MG tablet Take 10 mg by mouth every morning 0900 10/11/2024 at am    Emollient (AMLACTIN ULTRA EX) Apply topically daily as needed To feet prn    formoterol (PERFOROMIST) 20 MCG/2ML neb solution Take 20 mcg by nebulization every 12 hours 1000, 2300 10/11/2024 at am    furosemide (LASIX) 20 MG tablet Take 20 mg by mouth daily 0900 10/11/2024 at am    hypromellose (ARTIFICIAL TEARS) 0.5 % SOLN ophthalmic solution Place 2 drops into both eyes 2 times daily 0800, 2000 10/11/2024 at am    hypromellose (ARTIFICIAL TEARS) 0.5 % SOLN ophthalmic solution Place 2 drops into both eyes 2 times daily as needed for dry eyes prn    ipratropium - albuterol 0.5 mg/2.5 mg/3 mL (DUONEB) 0.5-2.5 (3) MG/3ML neb solution Take 1 vial by  nebulization 3 times daily as needed for shortness of breath, wheezing or cough prn    ipratropium-albuterol (COMBIVENT RESPIMAT)  MCG/ACT inhaler Inhale 1 puff into the lungs 4 times daily 0900, 1200 ,1600 ,2000 10/11/2024    loperamide (IMODIUM) 2 MG capsule Take 2 mg by mouth every 6 hours as needed for diarrhea prn    melatonin 3 MG tablet Take 3 mg by mouth at bedtime. 10/10/2024    methenamine hippurate (HIPREX) 1 g tablet Take 1 g by mouth 2 times daily 0900, 2000 10/11/2024    metoprolol tartrate (LOPRESSOR) 25 MG tablet Take 0.5 tablets (12.5 mg) by mouth 2 times daily 10/11/2024    pantoprazole (PROTONIX) 40 MG EC tablet Take 40 mg by mouth 2 times daily 0900, 2000 10/11/2024    senna-docusate (SENOKOT-S/PERICOLACE) 8.6-50 MG tablet Take 1 tablet by mouth 2 times daily 0900, 2000 10/11/2024    senna-docusate (SENOKOT-S/PERICOLACE) 8.6-50 MG tablet Take 1 tablet by mouth daily as needed for constipation prn    sertraline (ZOLOFT) 50 MG tablet Take 75 mg by mouth daily. 0900 10/11/2024 at am    simethicone (MYLICON) 125 MG chewable tablet Take 125 mg by mouth 3 times daily as needed for intestinal gas prn    Skin Protectants, Misc. (LANTISEPTIC SKIN PROTECTANT EX) Externally apply topically 2 times daily as needed Apply a thin film to vincent area/buttocks prn    Skin Protectants, Misc. (LANTISEPTIC SKIN PROTECTANT EX) Externally apply topically 2 times daily Apply a thin film to vincent area/buttocks 10/11/2024 at am    Vitamin D3 (VITAMIN D, CHOLECALCIFEROL,) 25 mcg (1000 units) tablet Take 1 tablet by mouth daily 0800 10/11/2024 at am

## 2024-10-13 LAB
ANION GAP SERPL CALCULATED.3IONS-SCNC: 9 MMOL/L (ref 7–15)
BUN SERPL-MCNC: 29.8 MG/DL (ref 8–23)
CALCIUM SERPL-MCNC: 8.7 MG/DL (ref 8.8–10.4)
CHLORIDE SERPL-SCNC: 100 MMOL/L (ref 98–107)
CREAT SERPL-MCNC: 1.02 MG/DL (ref 0.67–1.17)
EGFRCR SERPLBLD CKD-EPI 2021: 73 ML/MIN/1.73M2
ERYTHROCYTE [DISTWIDTH] IN BLOOD BY AUTOMATED COUNT: 17 % (ref 10–15)
GLUCOSE SERPL-MCNC: 169 MG/DL (ref 70–99)
HCO3 SERPL-SCNC: 28 MMOL/L (ref 22–29)
HCT VFR BLD AUTO: 32.7 % (ref 40–53)
HGB BLD-MCNC: 9.5 G/DL (ref 13.3–17.7)
MCH RBC QN AUTO: 25.8 PG (ref 26.5–33)
MCHC RBC AUTO-ENTMCNC: 29.1 G/DL (ref 31.5–36.5)
MCV RBC AUTO: 89 FL (ref 78–100)
PLATELET # BLD AUTO: 216 10E3/UL (ref 150–450)
POTASSIUM SERPL-SCNC: 3.8 MMOL/L (ref 3.4–5.3)
RBC # BLD AUTO: 3.68 10E6/UL (ref 4.4–5.9)
SODIUM SERPL-SCNC: 137 MMOL/L (ref 135–145)
WBC # BLD AUTO: 10.3 10E3/UL (ref 4–11)

## 2024-10-13 PROCEDURE — 120N000001 HC R&B MED SURG/OB

## 2024-10-13 PROCEDURE — 99232 SBSQ HOSP IP/OBS MODERATE 35: CPT | Performed by: INTERNAL MEDICINE

## 2024-10-13 PROCEDURE — 94640 AIRWAY INHALATION TREATMENT: CPT

## 2024-10-13 PROCEDURE — 80048 BASIC METABOLIC PNL TOTAL CA: CPT | Performed by: INTERNAL MEDICINE

## 2024-10-13 PROCEDURE — 250N000013 HC RX MED GY IP 250 OP 250 PS 637: Performed by: INTERNAL MEDICINE

## 2024-10-13 PROCEDURE — 250N000011 HC RX IP 250 OP 636: Performed by: INTERNAL MEDICINE

## 2024-10-13 PROCEDURE — 999N000157 HC STATISTIC RCP TIME EA 10 MIN

## 2024-10-13 PROCEDURE — 85027 COMPLETE CBC AUTOMATED: CPT | Performed by: INTERNAL MEDICINE

## 2024-10-13 PROCEDURE — 94640 AIRWAY INHALATION TREATMENT: CPT | Mod: 76

## 2024-10-13 PROCEDURE — 36415 COLL VENOUS BLD VENIPUNCTURE: CPT | Performed by: INTERNAL MEDICINE

## 2024-10-13 PROCEDURE — 82310 ASSAY OF CALCIUM: CPT | Performed by: INTERNAL MEDICINE

## 2024-10-13 PROCEDURE — 250N000009 HC RX 250: Performed by: INTERNAL MEDICINE

## 2024-10-13 RX ADMIN — PIPERACILLIN AND TAZOBACTAM 4.5 G: 4; .5 INJECTION, POWDER, FOR SOLUTION INTRAVENOUS at 06:57

## 2024-10-13 RX ADMIN — ACETAMINOPHEN 650 MG: 325 TABLET ORAL at 05:56

## 2024-10-13 RX ADMIN — PANTOPRAZOLE SODIUM 40 MG: 40 TABLET, DELAYED RELEASE ORAL at 08:21

## 2024-10-13 RX ADMIN — IPRATROPIUM BROMIDE AND ALBUTEROL SULFATE 3 ML: .5; 3 SOLUTION RESPIRATORY (INHALATION) at 15:52

## 2024-10-13 RX ADMIN — SENNOSIDES AND DOCUSATE SODIUM 2 TABLET: 50; 8.6 TABLET ORAL at 08:20

## 2024-10-13 RX ADMIN — CARBOXYMETHYLCELLULOSE SODIUM 2 DROP: 5 SOLUTION/ DROPS OPHTHALMIC at 20:47

## 2024-10-13 RX ADMIN — FORMOTEROL FUMARATE DIHYDRATE 20 MCG: 20 SOLUTION RESPIRATORY (INHALATION) at 19:23

## 2024-10-13 RX ADMIN — IPRATROPIUM BROMIDE AND ALBUTEROL SULFATE 3 ML: .5; 3 SOLUTION RESPIRATORY (INHALATION) at 19:23

## 2024-10-13 RX ADMIN — ASPIRIN 81 MG: 81 TABLET, COATED ORAL at 08:21

## 2024-10-13 RX ADMIN — IPRATROPIUM BROMIDE AND ALBUTEROL SULFATE 3 ML: .5; 3 SOLUTION RESPIRATORY (INHALATION) at 11:54

## 2024-10-13 RX ADMIN — SENNOSIDES AND DOCUSATE SODIUM 2 TABLET: 50; 8.6 TABLET ORAL at 20:47

## 2024-10-13 RX ADMIN — SIMETHICONE 226 ML: 125 CAPSULE, LIQUID FILLED ORAL at 14:25

## 2024-10-13 RX ADMIN — PIPERACILLIN AND TAZOBACTAM 4.5 G: 4; .5 INJECTION, POWDER, FOR SOLUTION INTRAVENOUS at 23:49

## 2024-10-13 RX ADMIN — PIPERACILLIN AND TAZOBACTAM 4.5 G: 4; .5 INJECTION, POWDER, FOR SOLUTION INTRAVENOUS at 11:39

## 2024-10-13 RX ADMIN — ALLOPURINOL 300 MG: 300 TABLET ORAL at 08:21

## 2024-10-13 RX ADMIN — PIPERACILLIN AND TAZOBACTAM 4.5 G: 4; .5 INJECTION, POWDER, FOR SOLUTION INTRAVENOUS at 17:16

## 2024-10-13 RX ADMIN — FORMOTEROL FUMARATE DIHYDRATE 20 MCG: 20 SOLUTION RESPIRATORY (INHALATION) at 07:10

## 2024-10-13 RX ADMIN — ATORVASTATIN CALCIUM 10 MG: 10 TABLET, FILM COATED ORAL at 08:21

## 2024-10-13 RX ADMIN — METOPROLOL TARTRATE 12.5 MG: 25 TABLET, FILM COATED ORAL at 20:47

## 2024-10-13 RX ADMIN — PIPERACILLIN AND TAZOBACTAM 4.5 G: 4; .5 INJECTION, POWDER, FOR SOLUTION INTRAVENOUS at 01:28

## 2024-10-13 RX ADMIN — PANTOPRAZOLE SODIUM 40 MG: 40 TABLET, DELAYED RELEASE ORAL at 20:47

## 2024-10-13 RX ADMIN — IPRATROPIUM BROMIDE AND ALBUTEROL SULFATE 3 ML: .5; 3 SOLUTION RESPIRATORY (INHALATION) at 07:10

## 2024-10-13 RX ADMIN — SERTRALINE HYDROCHLORIDE 75 MG: 50 TABLET ORAL at 08:21

## 2024-10-13 ASSESSMENT — ACTIVITIES OF DAILY LIVING (ADL)
ADLS_ACUITY_SCORE: 48
ADLS_ACUITY_SCORE: 48
ADLS_ACUITY_SCORE: 44
ADLS_ACUITY_SCORE: 48
ADLS_ACUITY_SCORE: 44
ADLS_ACUITY_SCORE: 48
ADLS_ACUITY_SCORE: 44
ADLS_ACUITY_SCORE: 48
ADLS_ACUITY_SCORE: 44
ADLS_ACUITY_SCORE: 48
ADLS_ACUITY_SCORE: 48
ADLS_ACUITY_SCORE: 44

## 2024-10-13 NOTE — PLAN OF CARE
Goal Outcome Evaluation:             10/13/24 [id number]     Orientation: A/Ox4. Pleasant.   Aggression Stop Light: Green  Activity: A2 lift. Q2h turn/repo as pt allows. Refused T/R at times, provided education.   Diet/BS Checks: minced/moist, mildly thicken. Room service. Ensure with each meal. Pt asks for ice chips ( tolerating well). Can feed himself-needs tray set-up.   Tele:  N/A  IV Access/Drains: R PIV SL patent.   Pain Management: Denies pain/nausea.   Abnormal VS/Results: VSS on 2-3L via NC ( baseline 2L)- wean off to 2L as tolerated, tachycardic at times.   Bowel/Bladder: Continent of bowel. On scheduled bowel regimen. Chronic Cardona in place. Pt agreed to do schedule Enema this shift- some soft formed brown BM.   Skin/Wounds: scattered bruising. Dry.   Consults: CM, nutrition.   D/C Disposition: pending.   Other Info:     -From [facility]  -Left sided body weakness/shaking from the stroke ( 2020) per pt.    -Had bed bath this shift. Had Enema done.

## 2024-10-13 NOTE — PROGRESS NOTES
[id number]  Orientation: A/Ox4.   Aggression Stop Light: Green  Activity: A2 lift. Q2h turn/repo as pt allows. Refused T/R at times  Diet/BS Checks: minced/moist, mildly thicken. Room service. Tolerates Ice chips well.   Tele:  N/A  IV Access/Drains: R PIV SL patent.   Pain Management: Denies pain/nausea.   Abnormal VS/Results: VSS on 3L via NC, Pt's baseline is 2L NC    Bowel/Bladder: Continent of bowel. No BM. Chronic Cardona in place.   Skin/Wounds: scattered bruising. Dry.   Consults: CM, nutrition.   D/C Disposition: pending.   Other Info:  Hx of Stroke - L side and Body weakness w/ shakes.

## 2024-10-13 NOTE — PROGRESS NOTES
[facility]    Medicine Progress Note - Hospitalist Service    Date of Admission:  10/11/2024    Assessment & Plan     Ron Gilmore is an 84 year old male with h/o COPD, chronically on 2 L of oxygen, HTN, CVA, HFpEF, oropharyngeal dysphagia, aspiration pneumonia, chronic urinary retention with chronic Cardona in place, nonambulatory, Terrie lift dependent, resident of assisted living who presents on 10/11/2024 with cough with shortness of breath and generalized weakness     , afebrile, respiratory rate 26, saturation is 93% on 2 L, blood pressure 113/65     CT C PE/A/P showed -   1.  No evidence pulmonary embolus.  2.  New bibasilar airspace disease/wall thickening/opacification, L > R compatible with pneumonia or aspiration.   3.  Mediastinal and hilar adenopathy may be reactive in the setting of infection.   4.  Rectal fecal impaction. Associated wall thickening suspicious for developing stercoral colitis.        Severe sepsis due to bibasilar pneumonia -due to aspiration vs bacterial, L>R  Reactive mediastinal and hilar lymphadenopathy  Oropharyngeal dysphagia  -2-day history of increased shortness of breath with cough and generalized weakness  -With lactate of 2.1, WBC 21, heart rate 101, respiratory 26-met criteria for severe sepsis   -VBG's showed pH 7.36, pCO2 58, bicarb 33  -COVID-19/influenza/RSV negative  -CT showed bibasilar infiltrate compatible with pneumonia or aspiration along with reactive mediastinal and hilar lymphadenopathy  -Has known history of oropharyngeal dysphagia and is on minced and moist diet with mildly thick liquids at baseline but does not follow it. Eats regular diet  -Was recently admitted from 8/23-8/25 due to aspiration pneumonia with sepsis  -blood culture negative.  Leukocytosis resolved  -Switch from IV Zosyn to p.o. Augmentin  -Speech therapy evaluation        COPD  Chronic hypoxic and hypercapnic respiratory failure, on 2 L of oxygen at  baseline  -stable, no exacerbation.  Oxygen requirements are stable.  Saturations 93% on 2 L  -VBG's showed normal pH of 7.36, mildly elevated PCO2 of 58 which is his baseline  -Continue usual  nebs-formoterol twice daily and DuoNebs every 4 hours while awake  -Continue supplemental oxygen at 2 L/min     Rectal fecal impaction with developing stercoral colitis  -Noted on CT.  Reports having bowel movement 2-3 days before admission  -No nausea or vomiting.  No abdominal or rectal pain  -continue aggressive bowel regimen- twice daily MiraLAX, Colace-senna 2 tablets twice daily  -Pink lady enema ordered on admission for 3 days.  Patient has so far been declining.  Agreeable to receive enema today.  -No documented bowel movement since admission     Chronic urinary retention with chronic Chirinos in place  -chirinos changed on admission. UA showed 37 WBCs, moderate LES, negative nitrities         Mildly elevated troponin-likely demand ischemia due to pneumonia with severe sepsis  -Troponin mildly elevated at 29 --> 28.  Stable trend.  EKG showed sinus tachycardia with no acute ischemic changes  -No further workup needed     Essential hypertension  -Continue metoprolol 12.5 mg twice daily     Chronic diastolic CHF with preserved EF of 55-60%, echo 4/2023  -Appears euvolemic.  proBNP 1630  -Hold Lasix 20 mg daily     Dyslipidemia  -Continue atorvastatin 10 mg daily     Previous CVA  -Continue aspirin 81 mg daily, atorvastatin 10 mg daily     Depression  -Stable.  Continue sertraline 75 mg daily     Diabetes mellitus type 2, diet controlled, with neuropathy, A1c 6.5  -Not on any medications.      GERD  -Continue Protonix     Chronic gout  -No acute exacerbation.  Continue allopurinol             Diet: Minced & Moist Diet (level 5) Mildly Thick (level 2)  Room Service  Snacks/Supplements Adult: Ensure Enlive; With Meals    DVT Prophylaxis: Enoxaparin (Lovenox) SQ  Chirinos Catheter: PRESENT, indication: Other (Comment) (Chronic  retention)  Lines: None     Cardiac Monitoring: None  Code Status: No CPR- Do NOT Intubate      Clinically Significant Risk Factors          # Hypochloremia: Lowest Cl = 97 mmol/L in last 2 days, will monitor as appropriate                   # DMII: A1C = 6.5 % (Ref range: <5.7 %) within past 6 months, PRESENT ON ADMISSION  # Overweight: Estimated body mass index is 29.84 kg/m  as calculated from the following:    Height as of this encounter: 1.829 m (6').    Weight as of this encounter: 99.8 kg (220 lb)., PRESENT ON ADMISSION  # Moderate Malnutrition: based on nutrition assessment, PRESENT ON ADMISSION   # COPD: noted on problem list        Disposition Plan     Medically Ready for Discharge: Anticipated in 2-4 Days             July Ray MD  Hospitalist Service  [facility]  Securely message with [employer] (more info)  Text page via [facility] Paging/Directory   ______________________________________________________________________    Interval History     Reports shortness of breath has improved.  Breathing is at baseline.  Now afebrile.    No bowel movement since admission.  Has been declining pink lady enema.    Discussed with patient about importance of taking enema.  He is agreeable.        Physical Exam   Vital Signs: Temp: 97.4  F (36.3  C) Temp src: Axillary BP: 101/60 Pulse: 81   Resp: 17 SpO2: 95 % O2 Device: Nasal cannula Oxygen Delivery: 2 LPM  Weight: 220 lbs 0 oz    Constitutional - awake and alert, resting in bed, in no acute distress  Cardiovascular - regular rate and rhythm, no murmurs, no edema  Pulmonary - bilateral rhonchi improved from before  GI - abdomen is soft, nontender, nondistended      Medical Decision Making       45 MINUTES SPENT BY ME on the date of service doing chart review, history, exam, documentation & further activities per the note.      Data     I have personally reviewed the following data over the past 24 hrs:    10.3  \   9.5 (L)   / 216     137 100  29.8 (H) /  169 (H)   3.8 28 1.02 \       Imaging results reviewed over the past 24 hrs:   No results found for this or any previous visit (from the past 24 hour(s)).

## 2024-10-13 NOTE — PROGRESS NOTES
[facility]    Medicine Progress Note - Hospitalist Service    Date of Admission:  10/11/2024    Assessment & Plan     Ron Gilmore is an 84 year old male with h/o COPD, chronically on 2 L of oxygen, HTN, CVA, HFpEF, oropharyngeal dysphagia, aspiration pneumonia, chronic urinary retention with chronic Cardona in place, nonambulatory, Terrie lift dependent, resident of assisted living who presents on 10/11/2024 with cough with shortness of breath and generalized weakness     , afebrile, respiratory rate 26, saturation is 93% on 2 L, blood pressure 113/65     CT C PE/A/P showed -   1.  No evidence pulmonary embolus.  2.  New bibasilar airspace disease/wall thickening/opacification, L > R compatible with pneumonia or aspiration.   3.  Mediastinal and hilar adenopathy may be reactive in the setting of infection.   4.  Rectal fecal impaction. Associated wall thickening suspicious for developing stercoral colitis.        Severe sepsis due to bibasilar pneumonia -due to aspiration vs bacterial, L>R  Reactive mediastinal and hilar lymphadenopathy  Oropharyngeal dysphagia  -2-day history of increased shortness of breath with cough and generalized weakness  -With lactate of 2.1, WBC 21, heart rate 101, respiratory 26-met criteria for severe sepsis   -VBG's showed pH 7.36, pCO2 58, bicarb 33  -COVID-19/influenza/RSV negative  -CT showed bibasilar infiltrate compatible with pneumonia or aspiration along with reactive mediastinal and hilar lymphadenopathy  -Has known history of oropharyngeal dysphagia and is on minced and moist diet with mildly thick liquids at baseline but does not follow it. Eats regular diet  -Was recently admitted from 8/23-8/25 due to aspiration pneumonia with sepsis  -Obtain sputum culture, blood culture  -continue IV Zosyn  -Speech therapy evaluation        COPD  Chronic hypoxic and hypercapnic respiratory failure, on 2 L of oxygen at baseline  -stable, no exacerbation.   Oxygen requirements are stable.  Saturations 93% on 2 L  -VBG's showed normal pH of 7.36, mildly elevated PCO2 of 58 which is his baseline  -Continue usual  nebs-formoterol twice daily and DuoNebs every 4 hours while awake  -Continue supplemental oxygen at 2 L/min     Rectal fecal impaction with developing stercoral colitis  -Noted on CT.  Reports having bowel movement 2-3 days before admission  -No nausea or vomiting.  No abdominal or rectal pain  -continue aggressive bowel regimen- pink lady enema daily for 3 days, twice daily MiraLAX, Colace-senna 2 tablets twice daily     Chronic urinary retention with chronic Chirinos in place  -chirinos changed on admission. UA showed 37 WBCs, moderate LES, negative nitrities         Mildly elevated troponin-likely demand ischemia due to pneumonia with severe sepsis  -Troponin mildly elevated at 29 --> 28.  Stable trend.  EKG showed sinus tachycardia with no acute ischemic changes  -No further workup needed     Essential hypertension  -Continue metoprolol 12.5 mg twice daily     Chronic diastolic CHF with preserved EF of 55-60%, echo 4/2023  -Appears euvolemic.  proBNP 1630  -Hold Lasix 20 mg daily     Dyslipidemia  -Continue atorvastatin 10 mg daily     Previous CVA  -Continue aspirin 81 mg daily, atorvastatin 10 mg daily     Depression  -Stable.  Continue sertraline 75 mg daily     Diabetes mellitus type 2, diet controlled, with neuropathy, A1c 6.5  -Not on any medications.      GERD  -Continue Protonix     Chronic gout  -No acute exacerbation.  Continue allopurinol             Diet: Minced & Moist Diet (level 5) Mildly Thick (level 2)  Room Service  Snacks/Supplements Adult: Ensure Enlive; With Meals    DVT Prophylaxis: Enoxaparin (Lovenox) SQ  Chirinos Catheter: PRESENT, indication:    Lines: None     Cardiac Monitoring: None  Code Status: No CPR- Do NOT Intubate      Clinically Significant Risk Factors          # Hypochloremia: Lowest Cl = 97 mmol/L in last 2 days, will monitor as  appropriate                   # DMII: A1C = 6.5 % (Ref range: <5.7 %) within past 6 months, PRESENT ON ADMISSION  # Overweight: Estimated body mass index is 29.84 kg/m  as calculated from the following:    Height as of this encounter: 1.829 m (6').    Weight as of this encounter: 99.8 kg (220 lb)., PRESENT ON ADMISSION  # Moderate Malnutrition: based on nutrition assessment, PRESENT ON ADMISSION   # COPD: noted on problem list        Disposition Plan     Medically Ready for Discharge: Anticipated in 2-4 Days             July Ray MD  Hospitalist Service  [facility]  Securely message with [employer] (more info)  Text page via [employer] Paging/Directory   ______________________________________________________________________    Interval History   Feels better than yesterday. Shortness of breath has improved  Afebrile     Physical Exam   Vital Signs: Temp: 98.5  F (36.9  C) Temp src: Oral BP: 139/70 Pulse: 57   Resp: 18 SpO2: 95 % O2 Device: Nasal cannula Oxygen Delivery: 2 LPM  Weight: 220 lbs 0 oz    Constitutional - awake and alert, resting in bed, in no acute distress  Cardiovascular - regular rate and rhythm, no murmurs, no edema  Pulmonary - bilateral rhonchi improved from before  GI - abdomen is soft, nontender, nondistended      Medical Decision Making       45 MINUTES SPENT BY ME on the date of service doing chart review, history, exam, documentation & further activities per the note.      Data     I have personally reviewed the following data over the past 24 hrs:    18.0 (H)  \   10.0 (L)   / 217     140 102 24.8 (H) /  135 (H)   4.3 29 1.11 \       Imaging results reviewed over the past 24 hrs:   No results found for this or any previous visit (from the past 24 hour(s)).

## 2024-10-14 LAB
BACTERIA UR CULT: ABNORMAL
BACTERIA UR CULT: ABNORMAL
CREAT SERPL-MCNC: 0.95 MG/DL (ref 0.67–1.17)
EGFRCR SERPLBLD CKD-EPI 2021: 80 ML/MIN/1.73M2
PLATELET # BLD AUTO: 208 10E3/UL (ref 150–450)

## 2024-10-14 PROCEDURE — 85049 AUTOMATED PLATELET COUNT: CPT | Performed by: INTERNAL MEDICINE

## 2024-10-14 PROCEDURE — 94640 AIRWAY INHALATION TREATMENT: CPT | Mod: 76

## 2024-10-14 PROCEDURE — 99232 SBSQ HOSP IP/OBS MODERATE 35: CPT | Performed by: INTERNAL MEDICINE

## 2024-10-14 PROCEDURE — 250N000011 HC RX IP 250 OP 636: Performed by: INTERNAL MEDICINE

## 2024-10-14 PROCEDURE — 250N000009 HC RX 250: Performed by: INTERNAL MEDICINE

## 2024-10-14 PROCEDURE — 82565 ASSAY OF CREATININE: CPT | Performed by: INTERNAL MEDICINE

## 2024-10-14 PROCEDURE — 250N000013 HC RX MED GY IP 250 OP 250 PS 637: Performed by: INTERNAL MEDICINE

## 2024-10-14 PROCEDURE — 36415 COLL VENOUS BLD VENIPUNCTURE: CPT | Performed by: INTERNAL MEDICINE

## 2024-10-14 PROCEDURE — 999N000157 HC STATISTIC RCP TIME EA 10 MIN

## 2024-10-14 PROCEDURE — 94640 AIRWAY INHALATION TREATMENT: CPT

## 2024-10-14 PROCEDURE — 120N000001 HC R&B MED SURG/OB

## 2024-10-14 RX ADMIN — SIMETHICONE 226 ML: 125 CAPSULE, LIQUID FILLED ORAL at 13:40

## 2024-10-14 RX ADMIN — ASPIRIN 81 MG: 81 TABLET, COATED ORAL at 09:52

## 2024-10-14 RX ADMIN — METOPROLOL TARTRATE 12.5 MG: 25 TABLET, FILM COATED ORAL at 09:52

## 2024-10-14 RX ADMIN — AMOXICILLIN AND CLAVULANATE POTASSIUM 1 TABLET: 875; 125 TABLET, FILM COATED ORAL at 09:52

## 2024-10-14 RX ADMIN — PIPERACILLIN AND TAZOBACTAM 4.5 G: 4; .5 INJECTION, POWDER, FOR SOLUTION INTRAVENOUS at 06:20

## 2024-10-14 RX ADMIN — FORMOTEROL FUMARATE DIHYDRATE 20 MCG: 20 SOLUTION RESPIRATORY (INHALATION) at 07:05

## 2024-10-14 RX ADMIN — CARBOXYMETHYLCELLULOSE SODIUM 2 DROP: 5 SOLUTION/ DROPS OPHTHALMIC at 09:52

## 2024-10-14 RX ADMIN — METOPROLOL TARTRATE 12.5 MG: 25 TABLET, FILM COATED ORAL at 20:29

## 2024-10-14 RX ADMIN — PANTOPRAZOLE SODIUM 40 MG: 40 TABLET, DELAYED RELEASE ORAL at 09:54

## 2024-10-14 RX ADMIN — AMOXICILLIN AND CLAVULANATE POTASSIUM 1 TABLET: 875; 125 TABLET, FILM COATED ORAL at 20:30

## 2024-10-14 RX ADMIN — IPRATROPIUM BROMIDE AND ALBUTEROL SULFATE 3 ML: .5; 3 SOLUTION RESPIRATORY (INHALATION) at 18:45

## 2024-10-14 RX ADMIN — IPRATROPIUM BROMIDE AND ALBUTEROL SULFATE 3 ML: .5; 3 SOLUTION RESPIRATORY (INHALATION) at 11:20

## 2024-10-14 RX ADMIN — PANTOPRAZOLE SODIUM 40 MG: 40 TABLET, DELAYED RELEASE ORAL at 20:30

## 2024-10-14 RX ADMIN — IPRATROPIUM BROMIDE AND ALBUTEROL SULFATE 3 ML: .5; 3 SOLUTION RESPIRATORY (INHALATION) at 07:05

## 2024-10-14 RX ADMIN — IPRATROPIUM BROMIDE AND ALBUTEROL SULFATE 3 ML: .5; 3 SOLUTION RESPIRATORY (INHALATION) at 15:20

## 2024-10-14 RX ADMIN — CARBOXYMETHYLCELLULOSE SODIUM 2 DROP: 5 SOLUTION/ DROPS OPHTHALMIC at 20:30

## 2024-10-14 RX ADMIN — SERTRALINE HYDROCHLORIDE 75 MG: 50 TABLET ORAL at 09:54

## 2024-10-14 RX ADMIN — FORMOTEROL FUMARATE DIHYDRATE 20 MCG: 20 SOLUTION RESPIRATORY (INHALATION) at 18:45

## 2024-10-14 RX ADMIN — ATORVASTATIN CALCIUM 10 MG: 10 TABLET, FILM COATED ORAL at 09:52

## 2024-10-14 RX ADMIN — ALLOPURINOL 300 MG: 300 TABLET ORAL at 09:53

## 2024-10-14 RX ADMIN — SENNOSIDES AND DOCUSATE SODIUM 2 TABLET: 50; 8.6 TABLET ORAL at 09:52

## 2024-10-14 RX ADMIN — SENNOSIDES AND DOCUSATE SODIUM 2 TABLET: 50; 8.6 TABLET ORAL at 20:30

## 2024-10-14 ASSESSMENT — ACTIVITIES OF DAILY LIVING (ADL)
ADLS_ACUITY_SCORE: 43
ADLS_ACUITY_SCORE: 51
ADLS_ACUITY_SCORE: 43
ADLS_ACUITY_SCORE: 51
ADLS_ACUITY_SCORE: 43
ADLS_ACUITY_SCORE: 43
ADLS_ACUITY_SCORE: 51
ADLS_ACUITY_SCORE: 43
ADLS_ACUITY_SCORE: 43
ADLS_ACUITY_SCORE: 51
ADLS_ACUITY_SCORE: 43
ADLS_ACUITY_SCORE: 51
ADLS_ACUITY_SCORE: 43
ADLS_ACUITY_SCORE: 51
ADLS_ACUITY_SCORE: 43
ADLS_ACUITY_SCORE: 51
ADLS_ACUITY_SCORE: 43

## 2024-10-14 NOTE — PLAN OF CARE
Goal Outcome Evaluation:      Plan of Care Reviewed With: patient (voicemail with senior living)

## 2024-10-14 NOTE — PLAN OF CARE
Goal Outcome Evaluation:     A&O x4.  VSS, on 2 L O2 NC per baseline. Dysphagia diet, mildly thick liquids. Takes pills whole with apple sauce. Left hemiparesis from hx of CVA, up with lift, reposition on sides Q2H, hyperpigmented skin/previous healed sacral/coccyx wound. Denies pain. Chronic chirinos patent. Enema done today, large stool output right after.

## 2024-10-14 NOTE — CONSULTS
Care Management Initial Consult    General Information  Assessment completed with: Patient, Children, Maikol (phone)  Type of CM/SW Visit: Initial Assessment    Primary Care Provider verified and updated as needed: Yes   Readmission within the last 30 days: no previous admission in last 30 days      Reason for Consult: discharge planning  Advance Care Planning: Advance Care Planning Reviewed: no concerns identified (on file)          Communication Assessment  Patient's communication style: spoken language (English or Bilingual)             Cognitive  Cognitive/Neuro/Behavioral: WDL                      Living Environment:   People in home: facility resident     Current living Arrangements: assisted living  Name of Facility: [facility] sakshi Chávez   Able to return to prior arrangements: yes       Family/Social Support:  Care provided by: homecare agency  Provides care for: no one  Marital Status:   Support system: Children          Description of Support System: Supportive, Involved         Current Resources:   Patient receiving home care services:  No        Community Resources:  Insurance  Equipment currently used at home:  Wheelchair, Lift, O2  Supplies currently used at home:      Employment/Financial:  Employment Status: retired        Financial Concerns: none           Does the patient's insurance plan have a 3 day qualifying hospital stay waiver?  No    Lifestyle & Psychosocial Needs:  Social Determinants of Health     Food Insecurity: Not on file   Depression: Not on file   Housing Stability: Not on file   Tobacco Use: Medium Risk (7/15/2024)    Patient History     Smoking Tobacco Use: Former     Smokeless Tobacco Use: Never     Passive Exposure: Not on file   Financial Resource Strain: Not on file   Alcohol Use: Not on file   Transportation Needs: Not on file   Physical Activity: Not on file   Interpersonal Safety: Not on file   Stress: Not on file   Social Connections: Not on file   Health Literacy: Not on  "file       Functional Status:  Prior to admission patient needed assistance: Patient is a total care with ADLS, is able to feed himself.             Mental Health Status: no concerns          Chemical Dependency Status: no concerns                Values/Beliefs:  Spiritual, Cultural Beliefs, Hinduism Practices, Values that affect care: no               Discussed  Partnership in Safe Discharge Planning  document with patient/family: Yes: Shannon    Additional Information:    Writer introduced our role in discharge planning. It was reported that Shannon is alert and oriented x4, but he says since his stroke \"I don't know anything anymore\". He lives at [facility], claims he does not receive any home care services, but also that \"they\" make appointments and schedule rides for appointments. He is largely bed bound, but has an electric scooter, he is having a motorized wheelchair built, it should be done around November. Writer called the [facility] and left a message. Looking to clarify his services, Oxygen and diet orders, as well as timing of discharge tomorrow.    Ritika Almonte RN Care Coordinator  [facility]      Addendum: 2:00pm yulissa left with Blanca [phone]    Addendum: Blanca returned call, verified patient receives no outside [facility] services. They require patient to be back no later than 1500 on DOD, ask that orders be faxed ASAP to [phone]. In anticipation of discharge Tuesday (10/15) Stretcher ride was scheduled with [facility] between 11:40-12:20      "

## 2024-10-14 NOTE — PROGRESS NOTES
[facility]    Medicine Progress Note - Hospitalist Service    Date of Admission:  10/11/2024    Assessment & Plan     Ron Gilmore is an 84 year old male with h/o COPD, chronically on 2 L of oxygen, HTN, CVA, HFpEF, oropharyngeal dysphagia, aspiration pneumonia, chronic urinary retention with chronic Cardona in place, nonambulatory, Terrie lift dependent, resident of assisted living who presents on 10/11/2024 with cough with shortness of breath and generalized weakness     , afebrile, respiratory rate 26, saturation is 93% on 2 L, blood pressure 113/65     CT C PE/A/P showed -   1.  No evidence pulmonary embolus.  2.  New bibasilar airspace disease/wall thickening/opacification, L > R compatible with pneumonia or aspiration.   3.  Mediastinal and hilar adenopathy may be reactive in the setting of infection.   4.  Rectal fecal impaction. Associated wall thickening suspicious for developing stercoral colitis.        Severe sepsis due to bibasilar pneumonia -due to aspiration vs bacterial, L>R  Reactive mediastinal and hilar lymphadenopathy  Oropharyngeal dysphagia  -2-day history of increased shortness of breath with cough and generalized weakness  -With lactate of 2.1, WBC 21, heart rate 101, respiratory 26-met criteria for severe sepsis   -VBG's showed pH 7.36, pCO2 58, bicarb 33  -COVID-19/influenza/RSV negative  -CT showed bibasilar infiltrate compatible with pneumonia or aspiration along with reactive mediastinal and hilar lymphadenopathy  -Has known history of oropharyngeal dysphagia and is on minced and moist diet with mildly thick liquids at baseline but does not follow it. Eats regular diet  -Was recently admitted from 8/23-8/25 due to aspiration pneumonia with sepsis  -Blood cultures negative  -Has been on IV Zosyn since 10/11.  Switch to Augmentin on 10/14.  7 day antibiotic course   -Speech therapy evaluation        COPD  Chronic hypoxic and hypercapnic respiratory failure,  on 2 L of oxygen at baseline  -stable, no exacerbation.  Oxygen requirements are stable.  Saturations 93% on 2 L  -VBG's showed normal pH of 7.36, mildly elevated PCO2 of 58 which is his baseline  -Continue usual  nebs-formoterol twice daily and DuoNebs every 4 hours while awake  -Continue supplemental oxygen at 2-3 L/min.  Currently on 2 L/min     Rectal fecal impaction with developing stercoral colitis  -Noted on CT.  Reports having bowel movement 2-3 days before admission  -No nausea or vomiting.  No abdominal or rectal pain  -Had good results with pink lady enema on 10/13.  Administer again on 10/14.    -Continue twice daily MiraLAX, Colace-senna 2 tablets twice daily     Chronic urinary retention with chronic Chirinos in place  -chirinos changed on admission. UA showed 37 WBCs, moderate LES, negative nitrities   -Urine culture negative        Mildly elevated troponin-likely demand ischemia due to pneumonia with severe sepsis  -Troponin mildly elevated at 29 --> 28.  Stable trend.  EKG showed sinus tachycardia with no acute ischemic changes  -No further workup needed     Essential hypertension  -Continue metoprolol 12.5 mg twice daily     Chronic diastolic CHF with preserved EF of 55-60%, echo 4/2023  -Appears euvolemic.  proBNP 1630  -Hold Lasix 20 mg daily     Dyslipidemia  -Continue atorvastatin 10 mg daily     Previous CVA  -Continue aspirin 81 mg daily, atorvastatin 10 mg daily     Depression  -Stable.  Continue sertraline 75 mg daily     Diabetes mellitus type 2, diet controlled, with neuropathy, A1c 6.5  -Not on any medications.      GERD  -Continue Protonix     Chronic gout  -No acute exacerbation.  Continue allopurinol             Diet: Minced & Moist Diet (level 5) Mildly Thick (level 2)  Room Service  Snacks/Supplements Adult: Ensure Enlive; With Meals    DVT Prophylaxis: Enoxaparin (Lovenox) SQ  Chirinos Catheter: PRESENT, indication: Other (Comment) (Chronic retention)  Lines: None     Cardiac Monitoring:  None  Code Status: No CPR- Do NOT Intubate      Clinically Significant Risk Factors                            # DMII: A1C = 6.5 % (Ref range: <5.7 %) within past 6 months, PRESENT ON ADMISSION  # Obesity: Estimated body mass index is 30.65 kg/m  as calculated from the following:    Height as of this encounter: 1.829 m (6').    Weight as of this encounter: 102.5 kg (225 lb 15.5 oz)., PRESENT ON ADMISSION  # Moderate Malnutrition: based on nutrition assessment, PRESENT ON ADMISSION   # COPD: noted on problem list        Disposition Plan         Medically Ready for Discharge: Anticipated Tomorrow             July Ray MD  Hospitalist Service  [facility]  Securely message with [employer] (more info)  Text page via [employer] Paging/Directory   ______________________________________________________________________    Interval History   Patient reports he feels okay.  Denies any shortness of breath.  Feels his breathing is at baseline  Had good bowel movement with pink lady enema yesterday      Physical Exam   Vital Signs: Temp: 97.1  F (36.2  C) Temp src: Oral BP: 107/63 Pulse: 87   Resp: 16 SpO2: 98 % O2 Device: Nasal cannula Oxygen Delivery: 2 LPM  Weight: 225 lbs 15.54 oz    Constitutional - awake and alert, resting in bed, in no acute distress  Cardiovascular - regular rate and rhythm, no murmurs, no edema  Pulmonary - bilateral rhonchi improved from before  GI - abdomen is soft, nontender, nondistended      Medical Decision Making       40 MINUTES SPENT BY ME on the date of service doing chart review, history, exam, documentation & further activities per the note.      Data     I have personally reviewed the following data over the past 24 hrs:    N/A  \   N/A   / 208     N/A N/A N/A /  N/A   N/A N/A 0.95 \       Imaging results reviewed over the past 24 hrs:   No results found for this or any previous visit (from the past 24 hour(s)).

## 2024-10-14 NOTE — PROGRESS NOTES
[id number]  Orientation: A/Ox4.   Aggression Stop Light: Green  Activity: A2 lift. Q2h turn/repo as pt allows. Refused T/R at times  Diet/BS Checks: minced/moist, mildly thicken. Room service. Tolerates Ice chips well.   Tele:  N/A  IV Access/Drains: R PIV SL patent.   Pain Management: Denies pain/nausea.   Abnormal VS/Results: VSS on 3L via NC, Pt's baseline is 2L NC    Bowel/Bladder: Continent of bowel. Bm x1 - Refused Miralax, Chronic Cardona in place.   Skin/Wounds: scattered bruising. Dry.   Consults: CM, nutrition.   D/C Disposition: pending.   Other Info:  Hx of Stroke - L side and Body weakness w/ shakes

## 2024-10-15 VITALS
TEMPERATURE: 99 F | WEIGHT: 225.97 LBS | HEART RATE: 82 BPM | DIASTOLIC BLOOD PRESSURE: 79 MMHG | BODY MASS INDEX: 30.61 KG/M2 | HEIGHT: 72 IN | OXYGEN SATURATION: 92 % | SYSTOLIC BLOOD PRESSURE: 141 MMHG | RESPIRATION RATE: 16 BRPM

## 2024-10-15 LAB — GLUCOSE BLDC GLUCOMTR-MCNC: 115 MG/DL (ref 70–99)

## 2024-10-15 PROCEDURE — 250N000013 HC RX MED GY IP 250 OP 250 PS 637: Performed by: INTERNAL MEDICINE

## 2024-10-15 PROCEDURE — 999N000157 HC STATISTIC RCP TIME EA 10 MIN

## 2024-10-15 PROCEDURE — 94640 AIRWAY INHALATION TREATMENT: CPT

## 2024-10-15 PROCEDURE — 99239 HOSP IP/OBS DSCHRG MGMT >30: CPT | Performed by: INTERNAL MEDICINE

## 2024-10-15 PROCEDURE — 250N000009 HC RX 250: Performed by: INTERNAL MEDICINE

## 2024-10-15 RX ORDER — POLYETHYLENE GLYCOL 3350 17 G/17G
17 POWDER, FOR SOLUTION ORAL DAILY
Qty: 510 G | Refills: 0 | Status: SHIPPED | OUTPATIENT
Start: 2024-10-15 | End: 2024-10-15

## 2024-10-15 RX ORDER — LEVOFLOXACIN 500 MG/1
500 TABLET, FILM COATED ORAL DAILY
Qty: 4 TABLET | Refills: 0 | Status: SHIPPED | OUTPATIENT
Start: 2024-10-15 | End: 2024-10-19

## 2024-10-15 RX ORDER — BISACODYL 10 MG
10 SUPPOSITORY, RECTAL RECTAL
Qty: 10 SUPPOSITORY | Refills: 0 | Status: SHIPPED | OUTPATIENT
Start: 2024-10-15 | End: 2024-10-29

## 2024-10-15 RX ORDER — LEVOFLOXACIN 500 MG/1
500 TABLET, FILM COATED ORAL DAILY
Status: DISCONTINUED | OUTPATIENT
Start: 2024-10-15 | End: 2024-10-15 | Stop reason: HOSPADM

## 2024-10-15 RX ADMIN — IPRATROPIUM BROMIDE AND ALBUTEROL SULFATE 3 ML: .5; 3 SOLUTION RESPIRATORY (INHALATION) at 11:09

## 2024-10-15 RX ADMIN — PANTOPRAZOLE SODIUM 40 MG: 40 TABLET, DELAYED RELEASE ORAL at 09:04

## 2024-10-15 RX ADMIN — METOPROLOL TARTRATE 12.5 MG: 25 TABLET, FILM COATED ORAL at 09:04

## 2024-10-15 RX ADMIN — ALLOPURINOL 300 MG: 300 TABLET ORAL at 09:04

## 2024-10-15 RX ADMIN — FORMOTEROL FUMARATE DIHYDRATE 20 MCG: 20 SOLUTION RESPIRATORY (INHALATION) at 09:00

## 2024-10-15 RX ADMIN — ASPIRIN 81 MG: 81 TABLET, COATED ORAL at 09:04

## 2024-10-15 RX ADMIN — LEVOFLOXACIN 500 MG: 500 TABLET, FILM COATED ORAL at 11:38

## 2024-10-15 RX ADMIN — IPRATROPIUM BROMIDE AND ALBUTEROL SULFATE 3 ML: .5; 3 SOLUTION RESPIRATORY (INHALATION) at 09:00

## 2024-10-15 RX ADMIN — AMOXICILLIN AND CLAVULANATE POTASSIUM 1 TABLET: 875; 125 TABLET, FILM COATED ORAL at 09:04

## 2024-10-15 RX ADMIN — SERTRALINE HYDROCHLORIDE 75 MG: 50 TABLET ORAL at 09:04

## 2024-10-15 RX ADMIN — ATORVASTATIN CALCIUM 10 MG: 10 TABLET, FILM COATED ORAL at 09:04

## 2024-10-15 RX ADMIN — SENNOSIDES AND DOCUSATE SODIUM 2 TABLET: 50; 8.6 TABLET ORAL at 09:04

## 2024-10-15 RX ADMIN — CARBOXYMETHYLCELLULOSE SODIUM 2 DROP: 5 SOLUTION/ DROPS OPHTHALMIC at 08:59

## 2024-10-15 ASSESSMENT — ACTIVITIES OF DAILY LIVING (ADL)
ADLS_ACUITY_SCORE: 50
ADLS_ACUITY_SCORE: 52
ADLS_ACUITY_SCORE: 50
ADLS_ACUITY_SCORE: 50
ADLS_ACUITY_SCORE: 56
ADLS_ACUITY_SCORE: 52
ADLS_ACUITY_SCORE: 50
ADLS_ACUITY_SCORE: 52
ADLS_ACUITY_SCORE: 52

## 2024-10-15 NOTE — PROVIDER NOTIFICATION
MD Notification    Notified Person: MD    Notified Person Name: Dr. Ray    Notification Date/Time: 0958 10/15/24    Notification Interaction: [employer] webpage    Purpose of Notification: you can remove miralax from pt discharge meds. pt refuses to take it    Orders Received:    Comments:

## 2024-10-15 NOTE — PLAN OF CARE
Orientation: A&Ox4  Aggression Stop Light: Green  Activity: A2 lift, T&R  Diet/BS Checks: Minced/moist with mildly thin liquids   Tele:  n/a  IV Access/Drains: RPIV CDI SL   Pain Management: Denied  Abnormal VS/Results: VSS on RA. UC positive, MD aware.  Bowel/Bladder: Cardona patent and CDI, incontinent of bowel   Skin/Wounds: Blue/purple to sacrum but intact, scattered bruising   Consults: Care coordinator   D/C Disposition: pending tomorrow, stretcher set up for 11:40-12:20   Other Info: Takes meds whole in applesauce

## 2024-10-15 NOTE — DISCHARGE SUMMARY
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    Probable colonization vs catheter associated acute cystitis due to Pseudomonas aeruginosa and Proteus mirabilis      Mildly elevated troponin-likely demand ischemia due to pneumonia with severe sepsis    Essential hypertension     Chronic diastolic CHF with preserved EF of 55-60%, echo 4/2023     Dyslipidemia     Previous CVA     Depression     Diabetes mellitus type 2, diet controlled, with neuropathy, A1c 6.5, not on medications     GERD     Chronic gout    Obesity: Estimated body mass index is 30.65 kg/m  as calculated from the following:    Moderate Malnutrition:     Clinically Significant Risk Factors     # DMII: A1C = 6.5 % (Ref range: <5.7 %) within past 6 months  # Obesity: Estimated body mass index is 30.65 kg/m  as calculated from the following:    Height as of this encounter: 1.829 m (6').    Weight as of this encounter: 102.5 kg (225 lb 15.5 oz).  # Moderate Malnutrition: based on nutrition assessment      Follow-ups Needed After Discharge   Follow-up Appointments     Follow-up and recommended labs and tests       Follow up with primary care provider, [facility],   within 7 days for hospital follow- up.  No follow up labs or test are   needed.        {Additional follow-up instructions/to-do's for PCP    :    Unresulted Labs Ordered in the Past 30 Days of this Admission       Date  and Time Order Name Status Description    10/11/2024  3:03 PM Blood Culture Peripheral Blood Preliminary             Discharge Disposition   Discharged to assisted living  Condition at discharge: Stable    Hospital Course     Ron Gilmore is an 84 year old male with h/o COPD, chronically on 2 L of oxygen, HTN, CVA, HFpEF, oropharyngeal dysphagia, aspiration pneumonia, chronic urinary retention with chronic Cardona in place, nonambulatory, Terrie lift dependent, resident of assisted living who presents on 10/11/2024 with cough with shortness of breath and generalized weakness     , afebrile, respiratory rate 26, saturation is 93% on 2 L, blood pressure 113/65     CT C PE/A/P showed -   1.  No evidence pulmonary embolus.  2.  New bibasilar airspace disease/wall thickening/opacification, L > R compatible with pneumonia or aspiration.   3.  Mediastinal and hilar adenopathy may be reactive in the setting of infection.   4.  Rectal fecal impaction. Associated wall thickening suspicious for developing stercoral colitis.        Severe sepsis due to bibasilar pneumonia -due to aspiration vs bacterial, L>R  Reactive mediastinal and hilar lymphadenopathy  Oropharyngeal dysphagia  -2-day history of increased shortness of breath with cough and generalized weakness  -With lactate of 2.1, WBC 21, heart rate 101, respiratory 26-met criteria for severe sepsis   -VBG's showed pH 7.36, pCO2 58, bicarb 33  -COVID-19/influenza/RSV negative  -CT showed bibasilar infiltrate compatible with pneumonia or aspiration along with reactive mediastinal and hilar lymphadenopathy  -Has known history of oropharyngeal dysphagia and is on minced and moist diet with mildly thick liquids at baseline but does not follow it. Eats regular diet  -Was recently admitted from 8/23-8/25 due to aspiration pneumonia with sepsis  -Blood cultures negative  -Treated with IV Zosyn (10/11-10/14).  Switch to Augmentin on 10/14.  Switch to levofloxacin 500 mg daily  for 4 more days at discharge  (to cover pneumonia and UTI )   -Minced and moist diet with mildly thick liquids prescribed at discharge.        COPD  Chronic hypoxic and hypercapnic respiratory failure, on 2 L of oxygen at baseline  -stable, no exacerbation.  Oxygen requirements are stable.  Saturations 93% on 2 L  -VBG's showed normal pH of 7.36, mildly elevated PCO2 of 58 which is his baseline  -Continue PTA nebs-formoterol twice daily and DuoNebs   -Continue supplemental oxygen at 2 L/min.       Rectal fecal impaction with developing stercoral colitis  -Noted on CT.  No nausea or vomiting.  No abdominal or rectal pain  -Had good results with pink lady enema on 10/13 and 10/14.    -Continue Colace-senna 1 tablets twice daily  -Patient declined MiraLAX.  Started on Dulcolax suppository every 72 hours at discharge     Chronic urinary retention with chronic Chirinos in place  Probable colonization versus catheter associated acute cystitis due to Pseudomonas aeruginosa and Proteus mirabilis  -chirinos changed on admission. UA showed 37 WBCs, moderate LES, negative nitrities   -No urinary symptoms.  Acute illness and sepsis protocol to the due to pneumonia.  Rule out  -Urine culture Pseudomonas aeruginosa and Proteus mirabilis.  -He received IV Zosyn on admission for aspiration pneumonia which would have covered UTI as well.  At discharge he was switched to levofloxacin to cover both pneumonia and UTI.  Though not entirely clear if he had true UTI or this was colonization.        Mildly elevated troponin-likely demand ischemia due to pneumonia with severe sepsis  -Troponin mildly elevated at 29 --> 28.  Stable trend.  EKG showed sinus tachycardia with no acute ischemic changes  -No further workup needed     Essential hypertension  -Continue metoprolol 12.5 mg twice daily     Chronic diastolic CHF with preserved EF of 55-60%, echo 4/2023  -Appears euvolemic.  proBNP 1630  -Lasix 20 mg daily was held in hospital.  Resumed at  discharge     Dyslipidemia  -Continue atorvastatin 10 mg daily     Previous CVA  -Continue aspirin 81 mg daily, atorvastatin 10 mg daily     Depression  -Continue sertraline 75 mg daily     Diabetes mellitus type 2, diet controlled, with neuropathy, A1c 6.5  -Not on any medications.      GERD  -Continue Protonix     Chronic gout  -No acute exacerbation.  Continue allopurinol    Obesity: Estimated body mass index is 30.65 kg/m  as calculated from the following:    Height as of this encounter: 1.829 m (6').    Weight as of this encounter: 102.5 kg (225 lb 15.5 oz)    Moderate Malnutrition: based on nutrition assessment, PRESENT ON ADMISSION       Patient discharged back to assisted living.  He is nonambulatory and is wheelchair-bound.        Consultations This Hospital Stay   CARE MANAGEMENT / SOCIAL WORK IP CONSULT  NUTRITION SERVICES ADULT IP CONSULT    Code Status   No CPR- Do NOT Intubate    Time Spent on this Encounter   IJuly MD, personally saw the patient today and spent greater than 30 minutes discharging this patient.       July Ray MD  [place] [id number] [facility]  [address] [place], [place] [address], [address] [address]  Phone: [phone]  ______________________________________________________________________    Physical Exam   Vital Signs: Temp: 99  F (37.2  C) Temp src: Oral BP: (!) 141/79 Pulse: 82   Resp: 16 SpO2: 92 % O2 Device: Nasal cannula Oxygen Delivery: 2 LPM  Weight: 225 lbs 15.54 oz    Constitutional - awake and alert, resting in bed, in no acute distress  Cardiovascular - regular rate and rhythm, no murmurs  Pulmonary -mild bilateral rhonchi   GI - abdomen is soft, nontender, nondistended  Integumentary - skin is warm and dry, no rashes or ulcers  Neurological - awake, alert and oriented x3.  normal speech, no focal deficits         Primary Care Physician   [facility]    Discharge Orders      Reason for your hospital stay    Pneumonia, UTI     Follow-up  and recommended labs and tests     Follow up with primary care provider, [facility], within 7 days for hospital follow- up.  No follow up labs or test are needed.     Activity    Your activity upon discharge: activity as tolerated     Oxygen Adult/Peds    Oxygen Documentation  I certify that this patient, Ron Gilmore has been under my care (or a nurse practitioner or physican's assistant working with me). This is the face-to-face encounter for oxygen medical necessity.      At the time of this encounter, I have reviewed the qualifying testing and have determined that supplemental oxygen is reasonable and necessary and is expected to improve the patient's condition in a home setting.         Patient has continued oxygen desaturation due to Chronic Respiratory Failure with Hypoxia J96.11  COPD J44.9.    If portability is ordered, is the patient mobile within the home? yes    Was this visit performed as a telehealth visit: No     Diet    Follow this diet upon discharge:       Snacks/Supplements Adult: Ensure Enlive; With Meals      Minced & Moist Diet (level 5) Mildly Thick (level 2)       Significant Results and Procedures   Results for orders placed or performed during the hospital encounter of 10/11/24   CT Chest (PE) Abdomen Pelvis w Contrast    Narrative    EXAM: CT CHEST PE ABDOMEN PELVIS W CONTRAST  LOCATION: [facility]  DATE: 10/11/2024    INDICATION: Cough and shortness of breath, LUQ rib pain with an elevated d dimer  COMPARISON: 5/4/2023  TECHNIQUE: CT chest pulmonary angiogram and routine CT abdomen pelvis with IV contrast. Arterial phase through the chest and venous phase through the abdomen and pelvis. Multiplanar reformats and MIP reconstructions were performed. Dose reduction   techniques were used.   CONTRAST: 111mL ISOVUE 370    FINDINGS:  ANGIOGRAM CHEST: Pulmonary arteries are normal caliber and negative for pulmonary emboli. Thoracic aorta is negative  for dissection. No CT evidence of right heart strain.     LUNGS AND PLEURA: New dense left lower lobe consolidation compatible with aspiration or pneumonia. Patchy right lower lobe airspace disease is also new in the interval. Associated right lower lobe bronchial wall thickening and opacification of left lower   lobe bronchi. Underlying changes of diffuse centrilobular emphysema. There are a few scattered calcified granulomas bilaterally..    MEDIASTINUM/AXILLAE: Enlarged paratracheal, prevascular, and bilateral hilar nodes, slightly increased in prominence compared to prior exam. Normal heart size.    CORONARY ARTERY CALCIFICATION: Moderate to severe    HEPATOBILIARY: Cholecystectomy.    PANCREAS: Unremarkable    SPLEEN: Unremarkable    ADRENAL GLANDS: Unremarkable    KIDNEYS/BLADDER: 7 mm left upper pole renal calculus. No hydronephrosis. Cardona catheter within the thick-walled decompressed bladder.    BOWEL: Large volume of fecal material in the rectum with associated wall thickening. No small bowel obstruction.    LYMPH NODES: No significant retroperitoneal adenopathy.    VASCULATURE: IVC filter. Moderate atherosclerotic disease within the aorta and pelvic branches.    PELVIC ORGANS: Mild prostatic hypertrophy.    MUSCULOSKELETAL: Right hip arthroplasty. Advanced degenerative changes in the left hip. Multilevel spondylosis.      Impression    IMPRESSION:  1.  No evidence pulmonary embolus.  2.  New bibasilar airspace disease] wall thickening/opacification, left greater than right compatible with pneumonia or aspiration. Clinical correlation and follow-up to resolution recommended.  3.  Mediastinal and hilar adenopathy may be reactive in the setting of infection. Attention at follow-up advised.  4.  CT findings consistent with rectal fecal impaction. Associated wall thickening suspicious for developing stercoral colitis.       Discharge Medications   Current Discharge Medication List        START taking these  medications    Details   bisacodyl (DULCOLAX) 10 MG suppository Place 1 suppository (10 mg) rectally every 72 hours.  Qty: 10 suppository, Refills: 0    Associated Diagnoses: Constipation, unspecified constipation type      levofloxacin (LEVAQUIN) 500 MG tablet Take 1 tablet (500 mg) by mouth daily for 4 days.  Qty: 4 tablet, Refills: 0    Associated Diagnoses: Pneumonia of both lungs due to infectious organism, unspecified part of lung           CONTINUE these medications which have NOT CHANGED    Details   acetaminophen (TYLENOL) 325 MG tablet Take 650 mg by mouth every 4 hours as needed for mild pain as needed for pain/fever Max dose 3000mg/24hr      allopurinol (ZYLOPRIM) 300 MG tablet Take 300 mg by mouth every morning 0900      ammonium lactate (LAC-HYDRIN) 12 % external lotion Apply topically daily Apply a thin film to bilateral feet and legs      aspirin (ASA) 81 MG EC tablet Take 1 tablet (81 mg) by mouth daily  Qty:      Associated Diagnoses: Acute and chronic respiratory failure with hypoxia (H)      atorvastatin (LIPITOR) 10 MG tablet Take 10 mg by mouth every morning 0900      Emollient (AMLACTIN ULTRA EX) Apply topically daily as needed To feet      formoterol (PERFOROMIST) 20 MCG/2ML neb solution Take 20 mcg by nebulization every 12 hours 1000, 2300      furosemide (LASIX) 20 MG tablet Take 20 mg by mouth daily 0900  Qty: 45 tablet, Refills: 0    Associated Diagnoses: Acute diastolic congestive heart failure (H)      !! hypromellose (ARTIFICIAL TEARS) 0.5 % SOLN ophthalmic solution Place 2 drops into both eyes 2 times daily 0800, 2000      !! hypromellose (ARTIFICIAL TEARS) 0.5 % SOLN ophthalmic solution Place 2 drops into both eyes 2 times daily as needed for dry eyes      ipratropium - albuterol 0.5 mg/2.5 mg/3 mL (DUONEB) 0.5-2.5 (3) MG/3ML neb solution Take 1 vial by nebulization 3 times daily as needed for shortness of breath, wheezing or cough      ipratropium-albuterol (COMBIVENT RESPIMAT)   MCG/ACT inhaler Inhale 1 puff into the lungs 4 times daily 0900, 1200 ,1600 ,2000      loperamide (IMODIUM) 2 MG capsule Take 2 mg by mouth every 6 hours as needed for diarrhea      melatonin 3 MG tablet Take 3 mg by mouth at bedtime.      methenamine hippurate (HIPREX) 1 g tablet Take 1 g by mouth 2 times daily 0900, 2000      metoprolol tartrate (LOPRESSOR) 25 MG tablet Take 0.5 tablets (12.5 mg) by mouth 2 times daily    Associated Diagnoses: Cerebrovascular accident (CVA), unspecified mechanism (H)      pantoprazole (PROTONIX) 40 MG EC tablet Take 40 mg by mouth 2 times daily 0900, 2000      !! senna-docusate (SENOKOT-S/PERICOLACE) 8.6-50 MG tablet Take 1 tablet by mouth 2 times daily 0900, 2000      !! senna-docusate (SENOKOT-S/PERICOLACE) 8.6-50 MG tablet Take 1 tablet by mouth daily as needed for constipation      sertraline (ZOLOFT) 50 MG tablet Take 75 mg by mouth daily. 0900      simethicone (MYLICON) 125 MG chewable tablet Take 125 mg by mouth 3 times daily as needed for intestinal gas      !! Skin Protectants, Misc. (LANTISEPTIC SKIN PROTECTANT EX) Externally apply topically 2 times daily as needed Apply a thin film to vincent area/buttocks      !! Skin Protectants, Misc. (LANTISEPTIC SKIN PROTECTANT EX) Externally apply topically 2 times daily Apply a thin film to vincent area/buttocks      Vitamin D3 (VITAMIN D, CHOLECALCIFEROL,) 25 mcg (1000 units) tablet Take 1 tablet by mouth daily 0800       !! - Potential duplicate medications found. Please discuss with provider.        Allergies   No Known Allergies

## 2024-10-15 NOTE — PLAN OF CARE
Goal Outcome Evaluation:      Plan of Care Reviewed With: patient      Shift:[id number]     Summary:Severe sepsis due to bibasilar pneumonia -due to aspiration vs bacterial, L>R,Reactive mediastinal and hilar lymphadenopathy,Oropharyngeal dysphagia    Orientation: A/Ox4.   Aggression Stop Light: Green  Activity: A2 lift. Q2h turn/repo   Diet/BS Checks: minced/moist, mildly thicken.   Tele:  N/A  IV Access/Drains: R PIV SL    Pain Management: Denies pain  Abnormal VS/Results: VSS on 2L via NC,baseline   Bowel/Bladder: Continent of bowel.No B/M this shift, Chronic Cardona in place.   Skin/Wounds: scattered bruising. Dry.   Consults: CM, nutrition.   D/C Disposition: pending.   Other Info:  Hx of Stroke - L side and Body weakness w/ shakes

## 2024-10-15 NOTE — PLAN OF CARE
Goal Outcome Evaluation:      Plan of Care Reviewed With: patient    Overall Patient Progress: improvingOverall Patient Progress: improving     [id number]  Discharge Note    Patient discharged to Assisted Living via EMS/BLS accompanied by no family/friend .  IV: Discontinued  Prescriptions filled and given to patient/family.   Belongings reviewed and sent with patient.   Home medications returned to patient: NA  Equipment sent with: patient, N/A.   patient verbalizes understanding of discharge instructions. AVS given to patient & EMS.

## 2024-10-15 NOTE — PROGRESS NOTES
Care Management Discharge Note    Discharge Date: 10/15/2024       Discharge Disposition: Assisted Living    Discharge Services: Transportation Services    Discharge DME:      Discharge Transportation: health plan transportation    Private pay costs discussed: Not applicable    Does the patient's insurance plan have a 3 day qualifying hospital stay waiver?  No    PAS Confirmation Code:    Patient/family educated on Medicare website which has current facility and service quality ratings:      Education Provided on the Discharge Plan: Yes  Persons Notified of Discharge Plans:  Gardenia RN at [facility], and López (stepdaughter)  Patient/Family in Agreement with the Plan: yes    Handoff Referral Completed: No, handoff not indicated or clinically appropriate    Additional Information:    Writer faxed orders to [facility], called and verified receipt with Blanca BLACK. ALso called step-daughter López to let her know he had been discharged, and transportation was set up for 11:40-12:30pm.    Ritika Almonte, RN Care Coordinator  [facility]

## 2024-10-16 LAB — BACTERIA BLD CULT: NO GROWTH

## 2024-10-29 ENCOUNTER — APPOINTMENT (OUTPATIENT)
Dept: SPEECH THERAPY | Facility: CLINIC | Age: 84
DRG: 556 | End: 2024-10-29
Attending: PHYSICIAN ASSISTANT
Payer: MEDICARE

## 2024-10-29 ENCOUNTER — APPOINTMENT (OUTPATIENT)
Dept: CT IMAGING | Facility: CLINIC | Age: 84
DRG: 556 | End: 2024-10-29
Attending: EMERGENCY MEDICINE
Payer: MEDICARE

## 2024-10-29 ENCOUNTER — HOSPITAL ENCOUNTER (INPATIENT)
Facility: CLINIC | Age: 84
LOS: 2 days | Discharge: NURSING, REHAB OR LONG-TERM CARE | DRG: 556 | End: 2024-10-31
Attending: EMERGENCY MEDICINE | Admitting: INTERNAL MEDICINE
Payer: MEDICARE

## 2024-10-29 ENCOUNTER — APPOINTMENT (OUTPATIENT)
Dept: MRI IMAGING | Facility: CLINIC | Age: 84
DRG: 556 | End: 2024-10-29
Attending: PHYSICIAN ASSISTANT
Payer: MEDICARE

## 2024-10-29 ENCOUNTER — APPOINTMENT (OUTPATIENT)
Dept: GENERAL RADIOLOGY | Facility: CLINIC | Age: 84
DRG: 556 | End: 2024-10-29
Attending: EMERGENCY MEDICINE
Payer: MEDICARE

## 2024-10-29 DIAGNOSIS — I63.9 ACUTE CVA (CEREBROVASCULAR ACCIDENT) (H): ICD-10-CM

## 2024-10-29 DIAGNOSIS — R53.1 LEFT-SIDED WEAKNESS: Primary | ICD-10-CM

## 2024-10-29 LAB
ALBUMIN UR-MCNC: NEGATIVE MG/DL
ANION GAP SERPL CALCULATED.3IONS-SCNC: 8 MMOL/L (ref 7–15)
APPEARANCE UR: CLEAR
APTT PPP: 28 SECONDS (ref 22–38)
ATRIAL RATE - MUSE: 75 BPM
BACTERIA #/AREA URNS HPF: ABNORMAL /HPF
BASOPHILS # BLD AUTO: 0 10E3/UL (ref 0–0.2)
BASOPHILS NFR BLD AUTO: 0 %
BILIRUB UR QL STRIP: NEGATIVE
BUN SERPL-MCNC: 19.3 MG/DL (ref 8–23)
CALCIUM SERPL-MCNC: 9.2 MG/DL (ref 8.8–10.4)
CHLORIDE SERPL-SCNC: 97 MMOL/L (ref 98–107)
COLOR UR AUTO: YELLOW
CREAT SERPL-MCNC: 0.95 MG/DL (ref 0.67–1.17)
DIASTOLIC BLOOD PRESSURE - MUSE: NORMAL MMHG
EGFRCR SERPLBLD CKD-EPI 2021: 80 ML/MIN/1.73M2
EOSINOPHIL # BLD AUTO: 0.4 10E3/UL (ref 0–0.7)
EOSINOPHIL NFR BLD AUTO: 5 %
ERYTHROCYTE [DISTWIDTH] IN BLOOD BY AUTOMATED COUNT: 17.3 % (ref 10–15)
GLUCOSE BLDC GLUCOMTR-MCNC: 119 MG/DL (ref 70–99)
GLUCOSE BLDC GLUCOMTR-MCNC: 94 MG/DL (ref 70–99)
GLUCOSE SERPL-MCNC: 133 MG/DL (ref 70–99)
GLUCOSE UR STRIP-MCNC: NEGATIVE MG/DL
HCO3 SERPL-SCNC: 33 MMOL/L (ref 22–29)
HCT VFR BLD AUTO: 36 % (ref 40–53)
HGB BLD-MCNC: 10.7 G/DL (ref 13.3–17.7)
HGB UR QL STRIP: NEGATIVE
IMM GRANULOCYTES # BLD: 0 10E3/UL
IMM GRANULOCYTES NFR BLD: 1 %
INR PPP: 1.1 (ref 0.85–1.15)
INTERPRETATION ECG - MUSE: NORMAL
KETONES UR STRIP-MCNC: NEGATIVE MG/DL
LEUKOCYTE ESTERASE UR QL STRIP: ABNORMAL
LYMPHOCYTES # BLD AUTO: 1.3 10E3/UL (ref 0.8–5.3)
LYMPHOCYTES NFR BLD AUTO: 15 %
MCH RBC QN AUTO: 25.8 PG (ref 26.5–33)
MCHC RBC AUTO-ENTMCNC: 29.7 G/DL (ref 31.5–36.5)
MCV RBC AUTO: 87 FL (ref 78–100)
MONOCYTES # BLD AUTO: 0.9 10E3/UL (ref 0–1.3)
MONOCYTES NFR BLD AUTO: 11 %
MUCOUS THREADS #/AREA URNS LPF: PRESENT /LPF
NEUTROPHILS # BLD AUTO: 5.8 10E3/UL (ref 1.6–8.3)
NEUTROPHILS NFR BLD AUTO: 68 %
NITRATE UR QL: POSITIVE
NRBC # BLD AUTO: 0 10E3/UL
NRBC BLD AUTO-RTO: 0 /100
P AXIS - MUSE: 32 DEGREES
PH UR STRIP: 5.5 [PH] (ref 5–7)
PLATELET # BLD AUTO: 224 10E3/UL (ref 150–450)
POTASSIUM SERPL-SCNC: 4.4 MMOL/L (ref 3.4–5.3)
PR INTERVAL - MUSE: 196 MS
QRS DURATION - MUSE: 128 MS
QT - MUSE: 426 MS
QTC - MUSE: 475 MS
R AXIS - MUSE: -5 DEGREES
RBC # BLD AUTO: 4.14 10E6/UL (ref 4.4–5.9)
RBC URINE: 52 /HPF
SODIUM SERPL-SCNC: 138 MMOL/L (ref 135–145)
SP GR UR STRIP: 1.01 (ref 1–1.03)
SQUAMOUS EPITHELIAL: <1 /HPF
SYSTOLIC BLOOD PRESSURE - MUSE: NORMAL MMHG
T AXIS - MUSE: 32 DEGREES
TROPONIN T SERPL HS-MCNC: 26 NG/L
TROPONIN T SERPL HS-MCNC: 33 NG/L
UROBILINOGEN UR STRIP-MCNC: NORMAL MG/DL
VENTRICULAR RATE- MUSE: 75 BPM
WBC # BLD AUTO: 8.5 10E3/UL (ref 4–11)
WBC URINE: 31 /HPF
YEAST #/AREA URNS HPF: ABNORMAL /HPF

## 2024-10-29 PROCEDURE — 80048 BASIC METABOLIC PNL TOTAL CA: CPT | Performed by: EMERGENCY MEDICINE

## 2024-10-29 PROCEDURE — 258N000003 HC RX IP 258 OP 636: Performed by: PHYSICIAN ASSISTANT

## 2024-10-29 PROCEDURE — 70496 CT ANGIOGRAPHY HEAD: CPT | Mod: MG

## 2024-10-29 PROCEDURE — 250N000011 HC RX IP 250 OP 636: Performed by: EMERGENCY MEDICINE

## 2024-10-29 PROCEDURE — 71045 X-RAY EXAM CHEST 1 VIEW: CPT

## 2024-10-29 PROCEDURE — A9585 GADOBUTROL INJECTION: HCPCS | Performed by: PHYSICIAN ASSISTANT

## 2024-10-29 PROCEDURE — 99223 1ST HOSP IP/OBS HIGH 75: CPT | Performed by: PHYSICIAN ASSISTANT

## 2024-10-29 PROCEDURE — 87186 SC STD MICRODIL/AGAR DIL: CPT | Performed by: PHYSICIAN ASSISTANT

## 2024-10-29 PROCEDURE — 70553 MRI BRAIN STEM W/O & W/DYE: CPT | Mod: MG

## 2024-10-29 PROCEDURE — 70498 CT ANGIOGRAPHY NECK: CPT | Mod: MG

## 2024-10-29 PROCEDURE — 84484 ASSAY OF TROPONIN QUANT: CPT | Performed by: PHYSICIAN ASSISTANT

## 2024-10-29 PROCEDURE — 84295 ASSAY OF SERUM SODIUM: CPT | Performed by: EMERGENCY MEDICINE

## 2024-10-29 PROCEDURE — 0042T CT HEAD PERFUSION W CONTRAST: CPT

## 2024-10-29 PROCEDURE — 999N000157 HC STATISTIC RCP TIME EA 10 MIN

## 2024-10-29 PROCEDURE — G1010 CDSM STANSON: HCPCS

## 2024-10-29 PROCEDURE — 250N000013 HC RX MED GY IP 250 OP 250 PS 637: Performed by: EMERGENCY MEDICINE

## 2024-10-29 PROCEDURE — 84484 ASSAY OF TROPONIN QUANT: CPT | Performed by: EMERGENCY MEDICINE

## 2024-10-29 PROCEDURE — 82565 ASSAY OF CREATININE: CPT | Performed by: EMERGENCY MEDICINE

## 2024-10-29 PROCEDURE — 92526 ORAL FUNCTION THERAPY: CPT | Mod: GN

## 2024-10-29 PROCEDURE — 85014 HEMATOCRIT: CPT | Performed by: EMERGENCY MEDICINE

## 2024-10-29 PROCEDURE — 120N000001 HC R&B MED SURG/OB

## 2024-10-29 PROCEDURE — 250N000009 HC RX 250: Performed by: EMERGENCY MEDICINE

## 2024-10-29 PROCEDURE — 85610 PROTHROMBIN TIME: CPT | Performed by: EMERGENCY MEDICINE

## 2024-10-29 PROCEDURE — 99221 1ST HOSP IP/OBS SF/LOW 40: CPT | Performed by: PSYCHIATRY & NEUROLOGY

## 2024-10-29 PROCEDURE — 36415 COLL VENOUS BLD VENIPUNCTURE: CPT | Performed by: PHYSICIAN ASSISTANT

## 2024-10-29 PROCEDURE — 250N000013 HC RX MED GY IP 250 OP 250 PS 637: Performed by: INTERNAL MEDICINE

## 2024-10-29 PROCEDURE — 94640 AIRWAY INHALATION TREATMENT: CPT

## 2024-10-29 PROCEDURE — 36415 COLL VENOUS BLD VENIPUNCTURE: CPT | Performed by: EMERGENCY MEDICINE

## 2024-10-29 PROCEDURE — 250N000009 HC RX 250: Performed by: PHYSICIAN ASSISTANT

## 2024-10-29 PROCEDURE — 85730 THROMBOPLASTIN TIME PARTIAL: CPT | Performed by: EMERGENCY MEDICINE

## 2024-10-29 PROCEDURE — 85025 COMPLETE CBC W/AUTO DIFF WBC: CPT | Performed by: EMERGENCY MEDICINE

## 2024-10-29 PROCEDURE — 255N000002 HC RX 255 OP 636: Performed by: PHYSICIAN ASSISTANT

## 2024-10-29 PROCEDURE — 99291 CRITICAL CARE FIRST HOUR: CPT | Mod: 25

## 2024-10-29 PROCEDURE — 81003 URINALYSIS AUTO W/O SCOPE: CPT | Performed by: PHYSICIAN ASSISTANT

## 2024-10-29 PROCEDURE — 92610 EVALUATE SWALLOWING FUNCTION: CPT | Mod: GN

## 2024-10-29 PROCEDURE — 93005 ELECTROCARDIOGRAM TRACING: CPT

## 2024-10-29 RX ORDER — LOPERAMIDE HYDROCHLORIDE 2 MG/1
2 CAPSULE ORAL EVERY 6 HOURS PRN
Status: DISCONTINUED | OUTPATIENT
Start: 2024-10-29 | End: 2024-10-31 | Stop reason: HOSPADM

## 2024-10-29 RX ORDER — IPRATROPIUM BROMIDE AND ALBUTEROL SULFATE 2.5; .5 MG/3ML; MG/3ML
1 SOLUTION RESPIRATORY (INHALATION) 3 TIMES DAILY PRN
COMMUNITY

## 2024-10-29 RX ORDER — NICOTINE POLACRILEX 4 MG
15-30 LOZENGE BUCCAL
Status: DISCONTINUED | OUTPATIENT
Start: 2024-10-29 | End: 2024-10-31 | Stop reason: HOSPADM

## 2024-10-29 RX ORDER — SODIUM CHLORIDE 9 MG/ML
INJECTION, SOLUTION INTRAVENOUS CONTINUOUS
Status: DISCONTINUED | OUTPATIENT
Start: 2024-10-29 | End: 2024-10-30

## 2024-10-29 RX ORDER — LORAZEPAM 2 MG/ML
.5-1 INJECTION INTRAMUSCULAR EVERY 4 HOURS PRN
Status: DISCONTINUED | OUTPATIENT
Start: 2024-10-29 | End: 2024-10-31 | Stop reason: HOSPADM

## 2024-10-29 RX ORDER — BISACODYL 10 MG
10 SUPPOSITORY, RECTAL RECTAL
Status: DISCONTINUED | OUTPATIENT
Start: 2024-10-30 | End: 2024-10-31 | Stop reason: HOSPADM

## 2024-10-29 RX ORDER — GLIPIZIDE 10 MG/1
2 TABLET ORAL 2 TIMES DAILY
Status: DISCONTINUED | OUTPATIENT
Start: 2024-10-29 | End: 2024-10-31 | Stop reason: HOSPADM

## 2024-10-29 RX ORDER — CLOPIDOGREL 300 MG/1
300 TABLET, FILM COATED ORAL ONCE
Status: COMPLETED | OUTPATIENT
Start: 2024-10-29 | End: 2024-10-29

## 2024-10-29 RX ORDER — METHENAMINE HIPPURATE 1000 MG/1
1 TABLET ORAL 2 TIMES DAILY
Status: DISCONTINUED | OUTPATIENT
Start: 2024-10-29 | End: 2024-10-31 | Stop reason: HOSPADM

## 2024-10-29 RX ORDER — ASPIRIN 81 MG/1
81 TABLET ORAL DAILY
Status: DISCONTINUED | OUTPATIENT
Start: 2024-10-30 | End: 2024-10-31 | Stop reason: HOSPADM

## 2024-10-29 RX ORDER — ACETAMINOPHEN 650 MG/1
650 SUPPOSITORY RECTAL EVERY 4 HOURS PRN
Status: DISCONTINUED | OUTPATIENT
Start: 2024-10-29 | End: 2024-10-31 | Stop reason: HOSPADM

## 2024-10-29 RX ORDER — ASPIRIN 325 MG
325 TABLET ORAL ONCE
Status: COMPLETED | OUTPATIENT
Start: 2024-10-29 | End: 2024-10-29

## 2024-10-29 RX ORDER — FORMOTEROL FUMARATE DIHYDRATE 20 UG/2ML
20 SOLUTION RESPIRATORY (INHALATION) EVERY 12 HOURS
Status: DISCONTINUED | OUTPATIENT
Start: 2024-10-29 | End: 2024-10-31 | Stop reason: HOSPADM

## 2024-10-29 RX ORDER — SIMETHICONE 125 MG
125 TABLET,CHEWABLE ORAL 3 TIMES DAILY PRN
Status: DISCONTINUED | OUTPATIENT
Start: 2024-10-29 | End: 2024-10-31 | Stop reason: HOSPADM

## 2024-10-29 RX ORDER — AMOXICILLIN 250 MG
1 CAPSULE ORAL DAILY PRN
Status: DISCONTINUED | OUTPATIENT
Start: 2024-10-29 | End: 2024-10-31 | Stop reason: HOSPADM

## 2024-10-29 RX ORDER — CLOPIDOGREL BISULFATE 75 MG/1
75 TABLET ORAL DAILY
Status: DISCONTINUED | OUTPATIENT
Start: 2024-10-30 | End: 2024-10-30

## 2024-10-29 RX ORDER — ASPIRIN 81 MG/1
81 TABLET, CHEWABLE ORAL DAILY
Status: DISCONTINUED | OUTPATIENT
Start: 2024-10-30 | End: 2024-10-31 | Stop reason: HOSPADM

## 2024-10-29 RX ORDER — ONDANSETRON 2 MG/ML
4 INJECTION INTRAMUSCULAR; INTRAVENOUS EVERY 6 HOURS PRN
Status: DISCONTINUED | OUTPATIENT
Start: 2024-10-29 | End: 2024-10-31 | Stop reason: HOSPADM

## 2024-10-29 RX ORDER — ALLOPURINOL 300 MG/1
300 TABLET ORAL EVERY MORNING
Status: DISCONTINUED | OUTPATIENT
Start: 2024-10-30 | End: 2024-10-31 | Stop reason: HOSPADM

## 2024-10-29 RX ORDER — LIDOCAINE 40 MG/G
CREAM TOPICAL
Status: DISCONTINUED | OUTPATIENT
Start: 2024-10-29 | End: 2024-10-31 | Stop reason: HOSPADM

## 2024-10-29 RX ORDER — ACETAMINOPHEN 325 MG/1
650 TABLET ORAL EVERY 4 HOURS PRN
Status: DISCONTINUED | OUTPATIENT
Start: 2024-10-29 | End: 2024-10-31 | Stop reason: HOSPADM

## 2024-10-29 RX ORDER — GADOBUTROL 604.72 MG/ML
10 INJECTION INTRAVENOUS ONCE
Status: COMPLETED | OUTPATIENT
Start: 2024-10-29 | End: 2024-10-29

## 2024-10-29 RX ORDER — PANTOPRAZOLE SODIUM 40 MG/1
40 TABLET, DELAYED RELEASE ORAL 2 TIMES DAILY
Status: DISCONTINUED | OUTPATIENT
Start: 2024-10-29 | End: 2024-10-31 | Stop reason: HOSPADM

## 2024-10-29 RX ORDER — ONDANSETRON 4 MG/1
4 TABLET, ORALLY DISINTEGRATING ORAL EVERY 6 HOURS PRN
Status: DISCONTINUED | OUTPATIENT
Start: 2024-10-29 | End: 2024-10-31 | Stop reason: HOSPADM

## 2024-10-29 RX ORDER — IOPAMIDOL 755 MG/ML
117 INJECTION, SOLUTION INTRAVASCULAR ONCE
Status: COMPLETED | OUTPATIENT
Start: 2024-10-29 | End: 2024-10-29

## 2024-10-29 RX ORDER — AMOXICILLIN 250 MG
2 CAPSULE ORAL 2 TIMES DAILY
Status: DISCONTINUED | OUTPATIENT
Start: 2024-10-29 | End: 2024-10-31 | Stop reason: HOSPADM

## 2024-10-29 RX ORDER — ATORVASTATIN CALCIUM 10 MG/1
10 TABLET, FILM COATED ORAL EVERY MORNING
Status: DISCONTINUED | OUTPATIENT
Start: 2024-10-30 | End: 2024-10-31 | Stop reason: HOSPADM

## 2024-10-29 RX ORDER — BISACODYL 10 MG
10 SUPPOSITORY, RECTAL RECTAL
COMMUNITY

## 2024-10-29 RX ORDER — IPRATROPIUM BROMIDE AND ALBUTEROL SULFATE 2.5; .5 MG/3ML; MG/3ML
1 SOLUTION RESPIRATORY (INHALATION) 3 TIMES DAILY PRN
Status: DISCONTINUED | OUTPATIENT
Start: 2024-10-29 | End: 2024-10-31 | Stop reason: HOSPADM

## 2024-10-29 RX ORDER — ACETAMINOPHEN 325 MG/10.15ML
650 LIQUID ORAL EVERY 4 HOURS PRN
Status: DISCONTINUED | OUTPATIENT
Start: 2024-10-29 | End: 2024-10-31 | Stop reason: HOSPADM

## 2024-10-29 RX ORDER — DEXTROSE MONOHYDRATE 25 G/50ML
25-50 INJECTION, SOLUTION INTRAVENOUS
Status: DISCONTINUED | OUTPATIENT
Start: 2024-10-29 | End: 2024-10-31 | Stop reason: HOSPADM

## 2024-10-29 RX ADMIN — FORMOTEROL FUMARATE DIHYDRATE 20 MCG: 20 SOLUTION RESPIRATORY (INHALATION) at 19:43

## 2024-10-29 RX ADMIN — SODIUM CHLORIDE 100 ML: 9 INJECTION, SOLUTION INTRAVENOUS at 10:00

## 2024-10-29 RX ADMIN — PANTOPRAZOLE SODIUM 40 MG: 40 INJECTION, POWDER, FOR SOLUTION INTRAVENOUS at 21:09

## 2024-10-29 RX ADMIN — IPRATROPIUM BROMIDE AND ALBUTEROL 1 PUFF: 20; 100 SPRAY, METERED RESPIRATORY (INHALATION) at 21:00

## 2024-10-29 RX ADMIN — CLOPIDOGREL BISULFATE 300 MG: 300 TABLET, FILM COATED ORAL at 11:36

## 2024-10-29 RX ADMIN — IOPAMIDOL 117 ML: 755 INJECTION, SOLUTION INTRAVENOUS at 10:00

## 2024-10-29 RX ADMIN — SODIUM CHLORIDE: 900 INJECTION, SOLUTION INTRAVENOUS at 18:31

## 2024-10-29 RX ADMIN — DEXTRAN 70, GLYCERIN, HYPROMELLOSE 2 DROP: 1; 2; 3 SOLUTION/ DROPS OPHTHALMIC at 20:58

## 2024-10-29 RX ADMIN — GADOBUTROL 10 ML: 604.72 INJECTION INTRAVENOUS at 16:14

## 2024-10-29 RX ADMIN — ASPIRIN 325 MG ORAL TABLET 325 MG: 325 PILL ORAL at 11:36

## 2024-10-29 ASSESSMENT — ACTIVITIES OF DAILY LIVING (ADL)
ADLS_ACUITY_SCORE: 0

## 2024-10-29 NOTE — PROGRESS NOTES
UPDATE/ADDENDUM:    SLP contacted and patient with noted overt s/s of aspiration with small amounts of mild and moderate thickened liquids (worse from baseline).  Recommending NPO with non-oral means of medication until video swallow can be completed tomorrow (10/30) morning.    --Hold order placed for PO medications.    --IV fluids with NS at 75 ml/hr ordered for 20 hours.      Tavares Magallon PA-C  [facility]  Securely message with the Vocera Web Console (learn more here)

## 2024-10-29 NOTE — ED NOTES
Per patient report, patient has a modified diet at baseline. Takes all medications whole in applesauce. Patient took medications without difficulty with applesauce.

## 2024-10-29 NOTE — PHARMACY-ADMISSION MEDICATION HISTORY
Pharmacist Admission Medication History    Admission medication history is complete. The information provided in this note is only as accurate as the sources available at the time of the update.    Information Source(s): Facility ([facility] medication list/MAR via N/A    Pertinent Information:   Med list obtained from [facility]. Patient was able to report when he last took his meds.     Changes made to PTA medication list:  Added: None  Deleted: None  Changed: None    Allergies reviewed with patient and updates made in EHR: no    Medication History Completed By: Emeli Matthews [facility] 10/29/2024 11:29 AM    PTA Med List   Medication Sig Last Dose/Taking    acetaminophen (TYLENOL) 325 MG tablet Take 650 mg by mouth every 4 hours as needed for mild pain as needed for pain/fever Max dose 3000mg/24hr Past Month    allopurinol (ZYLOPRIM) 300 MG tablet Take 300 mg by mouth every morning 0900 10/29/2024 Morning    aspirin (ASA) 81 MG EC tablet Take 1 tablet (81 mg) by mouth daily 10/29/2024 Morning    atorvastatin (LIPITOR) 10 MG tablet Take 10 mg by mouth every morning 0900 10/29/2024 Morning    bisacodyl (DULCOLAX) 10 MG suppository Place 10 mg rectally three times a week. Mon/Wed/Fri 10/28/2024 Morning    formoterol (PERFOROMIST) 20 MCG/2ML neb solution Take 20 mcg by nebulization every 12 hours 1000, 2300 10/29/2024 Morning    furosemide (LASIX) 20 MG tablet Take 20 mg by mouth daily 0900 10/29/2024 Morning    hypromellose (ARTIFICIAL TEARS) 0.5 % SOLN ophthalmic solution Place 2 drops into both eyes 2 times daily 0800, 2000 10/29/2024 Morning    hypromellose (ARTIFICIAL TEARS) 0.5 % SOLN ophthalmic solution Place 2 drops into both eyes 2 times daily as needed for dry eyes Past Month    ipratropium - albuterol 0.5 mg/2.5 mg/3 mL (DUONEB) 0.5-2.5 (3) MG/3ML neb solution Take 1 vial by nebulization 3 times daily as needed for shortness of breath, wheezing or cough. Past Month    ipratropium-albuterol (COMBIVENT  RESPIMAT)  MCG/ACT inhaler Inhale 1 puff into the lungs 4 times daily 0900, 1200 ,1600 ,2000 10/29/2024 Morning    loperamide (IMODIUM) 2 MG capsule Take 2 mg by mouth every 6 hours as needed for diarrhea Past Month    melatonin 3 MG tablet Take 3 mg by mouth at bedtime. 10/28/2024 Bedtime    methenamine hippurate (HIPREX) 1 g tablet Take 1 g by mouth 2 times daily 0900, 2000 10/29/2024 Morning    metoprolol tartrate (LOPRESSOR) 25 MG tablet Take 0.5 tablets (12.5 mg) by mouth 2 times daily 10/29/2024 Morning    pantoprazole (PROTONIX) 40 MG EC tablet Take 40 mg by mouth 2 times daily 0900, 2000 10/29/2024 Morning    senna-docusate (SENOKOT-S/PERICOLACE) 8.6-50 MG tablet Take 2 tablets by mouth 2 times daily. 0900, 2000 10/29/2024 Morning    senna-docusate (SENOKOT-S/PERICOLACE) 8.6-50 MG tablet Take 1 tablet by mouth daily as needed for constipation Past Month    sertraline (ZOLOFT) 50 MG tablet Take 75 mg by mouth daily. 0900 10/29/2024 Morning    simethicone (MYLICON) 125 MG chewable tablet Take 125 mg by mouth 3 times daily as needed for intestinal gas Past Month    Vitamin D3 (VITAMIN D, CHOLECALCIFEROL,) 25 mcg (1000 units) tablet Take 1 tablet by mouth daily 0800 10/29/2024 Morning

## 2024-10-29 NOTE — ED PROVIDER NOTES
Emergency Department Note      History of Present Illness     Chief Complaint   One-sided Weakness      HPI   Ron Gilmore is a 84 year old male who presents to the ED with right-sided weakness.  The patient has a history of a prior stroke with residual left-sided weakness.  He lives in a care facility.  He says he felt normal when he went to bed at 8:00 last night.  Normal for him means that he has good strength in the right arm and leg.  He is able to use his right arm without any deficits.  When he awoke this morning he was not able to lift his right arm against gravity.  Staff at his facility called EMS and he was brought in.  The patient uses aspirin but no other anticoagulants.  He denies any recent fever.  He says he has a chronic cough but this is not worse lately.  He has an indwelling Cardona catheter.    Independent Historian   History taken from EMS who note that the patient was well at 8:00 last night.    Review of External Notes   Discharge summary reviewed from October 15, 2024 when the patient was admitted with pneumonia.    Past Medical History     Medical History and Problem List   Past Medical History:   Diagnosis Date    BPH (benign prostatic hyperplasia)     Cataract     Cholelithiasis     COPD (chronic obstructive pulmonary disease) (H)     Depression     Diabetes mellitus     Dyshidrotic foot dermatitis     Edema     Gout     Hyperlipidemia     Hypertension     Infection due to 2019 novel coronavirus 5/1/2021    Kidney stones     Lumbago     Lumbar disc displacement without myelopathy     Muscle weakness     Neuropathy, diabetic (H)     Obesity     Spinal stenosis     Stroke (H)     Unsteady gait     Urinary retention with incomplete bladder emptying     UTI (urinary tract infection)     Vasovagal episode        Medications   acetaminophen (TYLENOL) 325 MG tablet  allopurinol (ZYLOPRIM) 300 MG tablet  aspirin (ASA) 81 MG EC tablet  atorvastatin (LIPITOR) 10 MG tablet  bisacodyl (DULCOLAX) 10  MG suppository  formoterol (PERFOROMIST) 20 MCG/2ML neb solution  furosemide (LASIX) 20 MG tablet  hypromellose (ARTIFICIAL TEARS) 0.5 % SOLN ophthalmic solution  hypromellose (ARTIFICIAL TEARS) 0.5 % SOLN ophthalmic solution  ipratropium - albuterol 0.5 mg/2.5 mg/3 mL (DUONEB) 0.5-2.5 (3) MG/3ML neb solution  ipratropium-albuterol (COMBIVENT RESPIMAT)  MCG/ACT inhaler  loperamide (IMODIUM) 2 MG capsule  melatonin 3 MG tablet  methenamine hippurate (HIPREX) 1 g tablet  metoprolol tartrate (LOPRESSOR) 25 MG tablet  pantoprazole (PROTONIX) 40 MG EC tablet  senna-docusate (SENOKOT-S/PERICOLACE) 8.6-50 MG tablet  senna-docusate (SENOKOT-S/PERICOLACE) 8.6-50 MG tablet  sertraline (ZOLOFT) 50 MG tablet  simethicone (MYLICON) 125 MG chewable tablet  Vitamin D3 (VITAMIN D, CHOLECALCIFEROL,) 25 mcg (1000 units) tablet  ammonium lactate (LAC-HYDRIN) 12 % external lotion        Surgical History   Past Surgical History:   Procedure Laterality Date    APPENDECTOMY OPEN      ARTHROSCOPY SHOULDER ROTATOR CUFF REPAIR      cataracts Bilateral     CHOLECYSTECTOMY      COLONOSCOPY  1986    COLONOSCOPY N/A 5/29/2021    Procedure: COLONOSCOPY;  Surgeon: Kofi Davis MD;  Location:  GI    CYSTOSCOPY  10/19/2011    Procedure:CYSTOSCOPY; CYSTOSCOPY, BLADDER STONE REMOVAL; Surgeon:ROB SAWYER; Location: OR    CYSTOSCOPY, TRANSURETHRAL RESECTION (TUR) PROSTATE, COMBINED N/A 2/21/2018    Procedure: COMBINED CYSTOSCOPY, TRANSURETHRAL RESECTION (TUR) PROSTATE;  COMBINED CYSTOSCOPY, TRANSURETHRAL RESECTION (TUR) PROSTATE ;  Surgeon: Rob Sawyer MD;  Location:  OR    EP LOOP RECORDER IMPLANT N/A 1/20/2020    Procedure: EP Loop Recorder Implant;  Surgeon: Evgeny Parisi MD;  Location:  [facility]    ESOPHAGOSCOPY, GASTROSCOPY, DUODENOSCOPY (EGD), COMBINED N/A 5/28/2021    Procedure: ESOPHAGOGASTRODUODENOSCOPY (EGD);  Surgeon: Aurora Waterman MD;  Location:  GI    ESOPHAGOSCOPY, GASTROSCOPY,  DUODENOSCOPY (EGD), DILATATION, COMBINED N/A 9/24/2022    Procedure: ESOPHAGOGASTRODUODENOSCOPY, WITH DILATION;  Surgeon: Kofi Davis MD;  Location:  GI    EYE SURGERY      right lid surgery     IR IVC FILTER PLACEMENT  5/24/2021    IR NEPHROSTOMY TUBE PLACEMENT RIGHT  3/9/2021    IR URETERAL STENT PLACEMENT RIGHT  3/16/2021    JOINT REPLACEMENT Right     HIP    KNEE SURGERY Bilateral     LAMINECTOMY LUMBAR ONE LEVEL      LASER HOLMIUM LITHOTRIPSY URETER(S), INSERT STENT, COMBINED Right 4/14/2021    Procedure: CYSTOSCOPY, BLADDER STONE REMOVAL, RIGHT URETEROSCOPY, HOLMIUM LASER LITHOTRIPSY, AND RIGHT STENT REMOVAL, RIGHT RETROGRADE;  Surgeon: Rob Sullivan MD;  Location:  OR    TONSILLECTOMY         Physical Exam     Patient Vitals for the past 24 hrs:   BP Temp Temp src Pulse Resp SpO2 Weight   10/29/24 1100 125/72 -- -- 70 15 92 % --   10/29/24 1045 -- -- -- 72 17 94 % --   10/29/24 1030 125/70 -- -- 75 19 95 % --   10/29/24 0952 137/77 97.4  F (36.3  C) Temporal 81 22 93 % 102 kg (224 lb 13.9 oz)     Physical Exam  Constitutional:       General: He is in acute distress.   HENT:      Head: Atraumatic.      Right Ear: External ear normal.      Left Ear: External ear normal.      Mouth/Throat:      Mouth: Mucous membranes are moist.      Pharynx: Oropharynx is clear.   Eyes:      General: No scleral icterus.     Conjunctiva/sclera: Conjunctivae normal.   Cardiovascular:      Rate and Rhythm: Normal rate and regular rhythm.      Heart sounds: Normal heart sounds.   Pulmonary:      Effort: Pulmonary effort is normal. No respiratory distress.      Breath sounds: Normal breath sounds.   Abdominal:      General: There is no distension.      Palpations: Abdomen is soft.      Tenderness: There is no abdominal tenderness.   Musculoskeletal:         General: No deformity.      Cervical back: Neck supple.   Skin:     General: Skin is warm.      Capillary Refill: Capillary refill takes less than 2  seconds.   Neurological:      Mental Status: He is alert.      Comments: No facial asymmetry.  Speech is fluent.  The patient has flaccid paresis of the left arm and leg.  He is unable to resist gravity with the right arm.  He has 4 out of 5 strength of the right leg but does have limb dysmetria.   Psychiatric:         Mood and Affect: Mood normal.         Behavior: Behavior normal.           Diagnostics     Lab Results   Labs Ordered and Resulted from Time of ED Arrival to Time of ED Departure   BASIC METABOLIC PANEL - Abnormal       Result Value    Sodium 138      Potassium 4.4      Chloride 97 (*)     Carbon Dioxide (CO2) 33 (*)     Anion Gap 8      Urea Nitrogen 19.3      Creatinine 0.95      GFR Estimate 80      Calcium 9.2      Glucose 133 (*)    TROPONIN T, HIGH SENSITIVITY - Abnormal    Troponin T, High Sensitivity 26 (*)    CBC WITH PLATELETS AND DIFFERENTIAL - Abnormal    WBC Count 8.5      RBC Count 4.14 (*)     Hemoglobin 10.7 (*)     Hematocrit 36.0 (*)     MCV 87      MCH 25.8 (*)     MCHC 29.7 (*)     RDW 17.3 (*)     Platelet Count 224      % Neutrophils 68      % Lymphocytes 15      % Monocytes 11      % Eosinophils 5      % Basophils 0      % Immature Granulocytes 1      NRBCs per 100 WBC 0      Absolute Neutrophils 5.8      Absolute Lymphocytes 1.3      Absolute Monocytes 0.9      Absolute Eosinophils 0.4      Absolute Basophils 0.0      Absolute Immature Granulocytes 0.0      Absolute NRBCs 0.0     INR - Normal    INR 1.10     PARTIAL THROMBOPLASTIN TIME - Normal    aPTT 28     GLUCOSE MONITOR NURSING POCT   ROUTINE UA WITH MICROSCOPIC REFLEX TO CULTURE       Imaging   XR Chest Port 1 View   Preliminary Result   IMPRESSION: No significant interval change. Shallow inspiration. Mild   bibasilar opacities may be related to atelectasis or infection. No   pneumothorax. Heart size is stable. Pulmonary vascularity is within   normal limits. Loop recorder device is projected over the left   hemithorax  laterally.       CT Head Perfusion w Contrast - For Tier 2 Stroke   Final Result   IMPRESSION: There is motion degradation of the examination. This   markedly limits assessment. While there is no clear evidence of   significant tissue at risk, the rapid software identifies areas that   do not correspond to brain tissue. Recommend that treatment decisions   based upon imaging should not be based on the CT perfusion but rather   the CT brain and CTA of the neck.      TAURUS HAGEN MD            SYSTEM ID:  [id number]      CTA Head Neck with Contrast   Final Result   IMPRESSION: No large vessel occlusion findings intracranially. No   high-grade stenosis in the neck.      Case discussed with Dr. Remy at 10:17 AM CST.            TAURUS HAGEN MD            SYSTEM ID:  [id number]      CT Head w/o Contrast   Final Result   IMPRESSION:   No acute intracranial hemorrhage.         TAURUS HAGEN MD            SYSTEM ID:  [id number]      MR Brain w/o & w Contrast    (Results Pending)       EKG   ECG results from 10/29/24   EKG 12-lead, tracing only     Value    Systolic Blood Pressure     Diastolic Blood Pressure     Ventricular Rate 75    Atrial Rate 75    SC Interval 196    QRS Duration 128        QTc 475    P Axis 32    R AXIS -5    T Axis 32    Interpretation ECG      Sinus rhythm  Right bundle branch block  Abnormal ECG  When compared with ECG of 11-Oct-2024 20:11,  No significant change was found          Independent Interpretation   Chest x-ray dependently interpreted.  No infiltrate.    ED Course      Medications Administered   Medications   iopamidol (ISOVUE-370) solution 117 mL (117 mLs Intravenous $Given 10/29/24 1000)     And   sodium chloride 0.9 % bag for CT scan flush use (100 mLs As instructed $Given 10/29/24 1000)   clopidogrel (PLAVIX) tablet 300 mg (300 mg Oral $Given 10/29/24 1136)   aspirin (ASA) tablet 325 mg (325 mg Oral $Given 10/29/24 1136)       Discussion of Management   [facility]  Vascular  neurology    ED Course   ED Course as of 10/29/24 1138   Tue Oct 29, 2024   1015 I spoke with radiology regarding the patient's CT findings         Medical Decision Making / Diagnosis     LIZANDRO Gilmore is a 84 year old male who presents to the ED with new right sided arm weakness.  Given his last known well time at 8 PM yesterday he had a tier 2 code stroke activated.  I saw him immediately on arrival and reevaluated him.  He has had a persistent right sided weakness.  CT scan is negative.  However, given his strong history with stroke in the past we feel most likely has had a subtle recurrent stroke.  Will get an MRI.  Stroke mimic such as migraine or seizure or electrolyte disturbances felt to be less likely.  He was admitted with pneumonia but x-ray does not show recurrent pneumonia today compared to earlier this month.  We will get a urine culture.    The patient has had close frequent repeat evaluations.  I spoke multiple times with stroke neurology who will be formally consulting.  The patient is overall hemodynamically stable.  He was loaded with Plavix and aspirin.  He will be admitted to the hospitalist service.    Critical Care time was 35 minutes for this patient excluding procedures.     Disposition   The patient was admitted to the hospital.     Diagnosis     ICD-10-CM    1. Acute CVA (cerebrovascular accident) (H)  I63.9             Carlos Remy MD  10/29/24 1138

## 2024-10-29 NOTE — PLAN OF CARE
Goal Outcome Evaluation:       Reason for Admission: R arm weakness - r/o CVA.    Cognitive/Mentation: A/Ox 4  Neuros/CMS: Intact ex baseline L hemiplegia (able to wiggle toes of L foot), RUE 4/5 with pronator drift, and dysarthria.  VS: WDL on 2 L O2 (uses O2 at home).   Tele: SR with a BBB.  /GI: Pre-existing chirinos for retention - adequate output. Continent. Last BM 10/28 per patient report.   Pulmonary: LS Diminished.  Pain: denies.     Drains/Lines: PIV saline locked.  Skin: Purple, blanchable area on coccyx with scabbing on R buttock.  WOC consulted. Repositioned q 2 hrs.   Activity: Assist x 2 with lift.  Diet: NPO with plan for video swallow eval tomorrow.     Therapies recs: TBD  Discharge: TBD    Aggression Stoplight Tool: green    End of shift summary: Transferred from ED. Pt had pureed diet with thickened liquids ordered, but speech was slurred so kept NPO until he was evaluated by SLP.  Continuing NPO with plan for video swallow eval.  RUE strength 3-4/5.  Completed MRI, result pending.

## 2024-10-29 NOTE — PROGRESS NOTES
RECEIVING UNIT ED HANDOFF REVIEW    ED Nurse Handoff Report was reviewed by: RAYMOND VARGAS RN on October 29, 2024 at 12:39 PM

## 2024-10-29 NOTE — CONSULTS
"[facility]     Stroke Code Note          History of Present Illness     Chief Complaint: One-sided Weakness      Ron Gilmore is a 84 year old male with past medical history significant for prior R internal capsule stroke (2020), HTN, HLD, COPD, DM2, urinary retention. He presented to the ED for evaluation of right arm weakness that was present on waking. He has left sided weakness at baseline from a prior stroke. He requires Terrie lift for transfers.          Past Medical History     Stroke risk factors: HTN, HLD, COPD, DM2, prior stroke    Preadmission antithrombotic regimen: aspirin    Modified Aiden Score (Pre-morbid)  4-Moderately severe disability; unable to walk without assistance and unable to attend to own bodily                   Assessment and Plan       1.  Right arm weakness that was present on waking. Inconsistent weakness on stroke team evaluation. Chronic left hemiparesis at baseline.      Intravenous Thrombolysis  Not given due to:   - unclear or unfavorable risk-benefit profile for extended window thrombolysis beyond the conventional 4.5 hour time window     Endovascular Treatment  Not initiated due to absence of proximal vessel occlusion     Plan:  - MRI brain w/wo.   - Continue PTA aspirin, load with Plavix 300 mg. Final antiplatelet plan pending MRI brain  - Continue statin, goal LDL 40-70  - Permissive HTN pending MRI brain.  - Further recommendations pending imaging     ___________________________________________________________________    Evelyn Orozco, CNP  Vascular Neurology    To page me or covering stroke neurology team member, click here: [facility]  Choose \"On Call\" tab at top, then select \"NEUROLOGY/ALL SITES\" from middle drop-down box, press Enter, then look for \"stroke\" or \"telestroke\" for your site.  ___________________________________________________________________        Imaging/Labs   (personally reviewed all)    CT head: negative for acute " pathology  CTA head/neck: no significant stenosis or LVO  CT Perfusion head: unremarkable          Physical Examination     BP: 129/68   Pulse: 72   Resp: 18   Temp: 98.5  F (36.9  C)   Temp src: Oral   SpO2: 97 %   O2 Device: Nasal cannula   Weight: 102.2 kg (225 lb 5 oz)    Wt Readings from Last 2 Encounters:   10/29/24 102.2 kg (225 lb 5 oz)   10/14/24 102.5 kg (225 lb 15.5 oz)       Neurologic  Mental Status:  alert, oriented x 3, follows commands, speech clear and fluent, naming and repetition normal  Cranial Nerves:  visual fields intact, EOMI with normal smooth pursuit, facial sensation intact and symmetric, hearing not formally tested but intact to conversation, no dysarthria, tongue protrusion midline, mild L facial weakness, edentulous   Motor:  no abnormal movements, give-way weakness in RUE (initially drifts to bed but later able to hold antigravity), RLE knee extention 4/5, R drop foot (chronic), LUE/LLE not maintained antigravity  Reflexes:   deferred  Sensory:  light touch sensation intact and symmetric throughout upper and lower extremities  Coordination:   no ataxia out of proportion to weakness  Station/Gait:  deferred        Stroke Scales       NIHSS  1a. Level of Consciousness 0-->Alert, keenly responsive   1b. LOC Questions 0-->Answers both questions correctly   1c. LOC Commands 0-->Performs both tasks correctly   2.   Best Gaze 0-->Normal   3.   Visual 0-->No visual loss   4.   Facial Palsy 1-->Minor paralysis (flattened nasolabial fold, asymmetry on smiling)   5a. Motor Arm, Left 2-->Some effort against gravity, limb cannot get to or maintain (if cued) 90 (or 45) degrees, drifts down to bed, but has some effort against gravity   5b. Motor Arm, Right 2-->Some effort against gravity, limb cannot get to or maintain (if cued) 90 (or 45) degrees, drifts down to bed, but has some effort against gravity   6a. Motor Leg, Left 3-->No effort against gravity, leg falls to bed immediately   6b. Motor Leg,  right 2-->Some effort against gravity, leg falls to bed by 5 secs, but has some effort against gravity   7.   Limb Ataxia 0-->Absent   8.   Sensory 0-->Normal, no sensory loss   9.   Best Language 0-->No aphasia, normal   10. Dysarthria 0-->Normal   11. Extinction and Inattention  0-->No abnormality   Total 10 (10/29/24 1010)            Labs     CBC  Lab Results   Component Value Date    HGB 10.7 (L) 10/29/2024    HCT 36.0 (L) 10/29/2024    WBC 8.5 10/29/2024     10/29/2024       BMP  Lab Results   Component Value Date     10/29/2024    POTASSIUM 4.4 10/29/2024    CHLORIDE 97 (L) 10/29/2024    CO2 33 (H) 10/29/2024    BUN 19.3 10/29/2024    CR 0.95 10/29/2024     (H) 10/29/2024    RAJ 9.2 10/29/2024       INR  INR   Date Value Ref Range Status   10/29/2024 1.10 0.85 - 1.15 Final   05/04/2023 1.04 0.85 - 1.15 Final   09/23/2022 1.16 (H) 0.85 - 1.15 Final       Data   Stroke Code Data  (for stroke code without tele)  Stroke code activated 10/29/24  0955   First stroke provider response 10/29/24  0957   Last known normal 10/28/24  2000   Time of discovery (or onset of symptoms) 10/29/24  0900   Head CT read by Stroke Neuro Provider 10/29/24  1008   Was stroke code de-escalated? Yes  10/29/24  1022        Clinically Significant Risk Factors Present on Admission          # Hypochloremia: Lowest Cl = 97 mmol/L in last 2 days, will monitor as appropriate        # Drug Induced Platelet Defect: home medication list includes an antiplatelet medication      # Anemia: based on hgb <11      # DMII: A1C = 6.5 % (Ref range: <5.7 %) within past 6 months    # Obesity: Estimated body mass index is 30.56 kg/m  as calculated from the following:    Height as of 10/11/24: 1.829 m (6').    Weight as of this encounter: 102.2 kg (225 lb 5 oz).       # Financial/Environmental Concerns:    # COPD: noted on problem list       Time Spent on this Encounter   Billing: I personally examined and evaluated the patient today. At  the time of my evaluation and management the patient was critical condition today due to hemiparesis. I personally managed chart review. Key decisions made today included MRI. I spent a total of 45 minutes providing critical care services, evaluating the patient, directing care and reviewing laboratory values and radiologic reports.

## 2024-10-29 NOTE — ED TRIAGE NOTES
Pt presents to the ED via EMS for evaluation right arm weakness.     Per EMS: Right arm weakness noted this morning, LKW at 2000 last evening, hx of stroke in 2020 left sided deficits from previous stroke. Indwelling chirinos, 2L via NC. Pre hospital blood sugar: 165.      Triage Assessment (Adult)       Row Name 10/29/24 0949          Triage Assessment    Airway WDL WDL        Respiratory WDL    Respiratory WDL rhythm/pattern     Rhythm/Pattern, Respiratory depth regular;pattern regular;unlabored        Cognitive/Neuro/Behavioral WDL    Cognitive/Neuro/Behavioral WDL all     Level of Consciousness alert     Arousal Level opens eyes spontaneously     Orientation oriented x 4     Speech clear;spontaneous;logical     Mood/Behavior calm;cooperative

## 2024-10-29 NOTE — ED NOTES
[facility]  ED Nurse Handoff Report    ED Chief complaint: One-sided Weakness      ED Diagnosis:   Final diagnoses:   Acute CVA (cerebrovascular accident) (H)       Code Status: DNR / DNI    Allergies: No Known Allergies    Patient Story: Pt presents to the ED via EMS for evaluation right arm weakness.      Per EMS: Right arm weakness noted this morning, LKW at 2000 last evening, hx of stroke in 2020 left sided deficits from previous stroke. Indwelling chirinos, 2L via NC. Pre hospital blood sugar: 165    Focused Assessment:   84 year old male who presents to the ED with right-sided weakness.  The patient has a history of a prior stroke with residual left-sided weakness.  He lives in a care facility.  He says he felt normal when he went to bed at 8:00 last night.  Normal for him means that he has good strength in the right arm and leg.  He is able to use his right arm without any deficits.  When he awoke this morning he was not able to lift his right arm against gravity.  Staff at his facility called EMS and he was brought in.  The patient uses aspirin but no other anticoagulants.  He denies any recent fever.  He says he has a chronic cough but this is not worse lately.  He has an indwelling Chirinos catheter.     Treatments and/or interventions provided:   XR Chest Port 1 View   Preliminary Result   IMPRESSION: No significant interval change. Shallow inspiration. Mild   bibasilar opacities may be related to atelectasis or infection. No   pneumothorax. Heart size is stable. Pulmonary vascularity is within   normal limits. Loop recorder device is projected over the left   hemithorax laterally.       CT Head Perfusion w Contrast - For Tier 2 Stroke   Final Result   IMPRESSION: There is motion degradation of the examination. This   markedly limits assessment. While there is no clear evidence of   significant tissue at risk, the rapid software identifies areas that   do not correspond to brain tissue.  Recommend that treatment decisions   based upon imaging should not be based on the CT perfusion but rather   the CT brain and CTA of the neck.      TAURUS HAGEN MD            SYSTEM ID:  [id number]      CTA Head Neck with Contrast   Final Result   IMPRESSION: No large vessel occlusion findings intracranially. No   high-grade stenosis in the neck.      Case discussed with Dr. Remy at 10:17 AM CST.            TAURUS HAGEN MD            SYSTEM ID:  [id number]      CT Head w/o Contrast   Final Result   IMPRESSION:   No acute intracranial hemorrhage.         TAURUS HAGEN MD            SYSTEM ID:  [id number]      MR Brain w/o & w Contrast    (Results Pending)      Labs Ordered and Resulted from Time of ED Arrival to Time of ED Departure   BASIC METABOLIC PANEL - Abnormal       Result Value    Sodium 138      Potassium 4.4      Chloride 97 (*)     Carbon Dioxide (CO2) 33 (*)     Anion Gap 8      Urea Nitrogen 19.3      Creatinine 0.95      GFR Estimate 80      Calcium 9.2      Glucose 133 (*)    TROPONIN T, HIGH SENSITIVITY - Abnormal    Troponin T, High Sensitivity 26 (*)    CBC WITH PLATELETS AND DIFFERENTIAL - Abnormal    WBC Count 8.5      RBC Count 4.14 (*)     Hemoglobin 10.7 (*)     Hematocrit 36.0 (*)     MCV 87      MCH 25.8 (*)     MCHC 29.7 (*)     RDW 17.3 (*)     Platelet Count 224      % Neutrophils 68      % Lymphocytes 15      % Monocytes 11      % Eosinophils 5      % Basophils 0      % Immature Granulocytes 1      NRBCs per 100 WBC 0      Absolute Neutrophils 5.8      Absolute Lymphocytes 1.3      Absolute Monocytes 0.9      Absolute Eosinophils 0.4      Absolute Basophils 0.0      Absolute Immature Granulocytes 0.0      Absolute NRBCs 0.0     INR - Normal    INR 1.10     PARTIAL THROMBOPLASTIN TIME - Normal    aPTT 28     GLUCOSE MONITOR NURSING POCT   ROUTINE UA WITH MICROSCOPIC REFLEX TO CULTURE      Does this patient have any cognitive concerns?: Forgetful    Activity level - Baseline/Home:  Total  Care  Activity Level - Current:   Total Care    Patient's Preferred language: English   Needed?: No    Isolation: Contact  Infection: Not Applicable  Patient tested for COVID 19 prior to admission: NO  Bariatric?: No    Vital Signs:   Vitals:    10/29/24 0952 10/29/24 1030 10/29/24 1045 10/29/24 1100   BP: 137/77 125/70  125/72   Pulse: 81 75 72 70   Resp: 22 19 17 15   Temp: 97.4  F (36.3  C)      TempSrc: Temporal      SpO2: 93% 95% 94% 92%   Weight: 102 kg (224 lb 13.9 oz)          Cardiac Rhythm:Cardiac Rhythm: Normal sinus rhythm (denies cp)    Was the PSS-3 completed:   Yes  Family Comments: Family updated  OBS brochure/video discussed/provided to patient/family: No    For the majority of the shift this patient's behavior was Green.   Behavioral interventions performed were Care explained.    ED NURSE PHONE NUMBER: [phone]

## 2024-10-29 NOTE — H&P
[facility]  History and Physical - Hospitalist Service       Date of Admission:  10/29/2024  PRIMARY CARE PROVIDER:    Zach, Fady Physician    Assessment & Plan   Ron Gilmore is a 84 year old male admitted on 10/29/2024 due to suspected CVA with right sided weakness.      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    Patient presented to the ED due to right arm weakness (unable to lift up against gravity).  This developed this morning and he was last known well ~around 8PM the night prior to presentation.  Staff at his facility called EMS.      Work-up in the ED included a BMP that revealed a chloride of 97, CO2 of 33, glucose of 133 otherwise unremarkable.  CBC with differential revealed a hemoglobin of 10.7, hematocrit of 36, RBC count of 4.14, MCH of 25.8, MCHC of 29.7, RDW of 17.3 otherwise unremarkable.  INR and PTT were within normal limits.  High-sensitivity troponin T was elevated at 26.  EKG showed sinus rhythm with right bundle branch block.    Head CT without contrast was negative for acute intercranial hemorrhage, no hydrocephalus or extra-axial hemorrhage, no midline shift or mass effect noted, multifocal remote lacunar infarcts in the bilateral basal ganglia is noted, intracranial atheromatous disease is present, mild to moderate global cortical volume loss with ex vacuo dilatation of the ventricular system and chronic small vessel ischemic changes and periventricular distribution.  Head/neck CTA with contrast was negative for large vessel occlusion (intracranially), no high-grade stenosis noted in the neck.  Perfusion CT of the head with contrast with noted  motion degradation which markedly limits assessment.  One-view portable chest x-ray with mild bibasilar opacities (may be related to atelectasis or an infection) Loop recorder devices project over the left hemothorax laterally.    Loading dose of Plavix 300 mg and full dose aspirin (325 mg) have been ordered but yet to be administered.    Suspected Acute CVA with right upper extremity weakness  Residual left sided deficit from previous CVA  *A1c from 8/20/24 at 6.5%.    - Stroke Neurology consult requested.  - Vital signs and neuro checks every 4 hours.    - Brain MRI w and w/o contrast.     --PRN Ativan available due to claustrophobia.    - Monitor on telemetry.    - ECHO ordered.     - Trend troponin.    - Lipid panel ordered.    - Modified diet as below.    - PT/OT/SLP consults requested.    - SW/CM consult requested.    - Allow for permissive hypertension for the next 24 hours.  Hold PTA antihypertensives (Lopressor 12.5 mg BID and lasix 20 mg/d)  - PRN IV antihypertensives available (hydralazine, labetalol).  - ASA 81 mg/d and Plavix 75 mg/d to start 10/30 (received loading dose of Plavix 300 mg in the ED as well as a full dose ASA).      Oropharyngeal dysphagia  - Continue with modified diet (level 5-Minced and Moist) with mildly thickened (level 2) liquids.    - SLP consult requested.      Chronic hypoxic and hypercapnic respiratory failure  Recent bibasilar pneumonia  *Recent hospitalization 10/11-10/15 due to bibasilar pneumonia.  Patient has completed course of antibiotics.    - Continue with chronic supplemental O2 at 2 L/m.      Urinary retention with chronic chirinos catheter  BPH  - Continue with Chirinos Catheter and cares.    - Resumed on PTA Hiprex 1g BID.      Constipation  - Scheduled and PRN bowel medications ordered.      HTN  - Allow for permissive hypertension for the next 24 hours.  Hold PTA antihypertensives (Lopressor 12.5 mg BID and lasix 20 mg/d)  - PRN IV antihypertensives available  (hydralazine, labetalol).    HLP  - Resumed on PTA atorvastatin 10 mg/d.  - Lipid panel ordered.        Type 2 DM  Neuropathy  Recent Labs   Lab Test 08/20/24  1726   A1C 6.5*   - Glucose checks ordered.    - Hypoglycemic protocol in place.      Obesity   BRAD  Body mass index is 30.5 kg/m .  Patient is non-compliant with CPAP (could not tolerate this).  Increase in all-cause morbidity and mortality.   - Follow up with PCP regarding ongoing management.    - Continue with chronic supplemental O2.      COPD   - Resumed on PTA Combivent inhaler, formoterol neb therapy every 12 hours, PRN DuoNeb TID.    - Encourage use of Flutter valve/IS.      Major depressive disorder with anxiety  - Resumed on PTA Zoloft 75 mg/d.    Gout  - Resumed on PTA allopurinol 300 mg/d.      Lower extremity edema  - Hold PTA lasix 20 mg/d.      Cholelithiasis  *Noted on chart review.  No interventions.      History of VRE infection  - Contact isolation.      Clinically Significant Risk Factors Present on Admission          # Hypochloremia: Lowest Cl = 97 mmol/L in last 2 days, will monitor as appropriate        # Drug Induced Platelet Defect: home medication list includes an antiplatelet medication      # Anemia: based on hgb <11  # DMII: A1C = 6.5 % (Ref range: <5.7 %) within past 6 months    # Obesity: Estimated body mass index is 30.5 kg/m  as calculated from the following:    Height as of 10/11/24: 1.829 m (6').    Weight as of this encounter: 102 kg (224 lb 13.9 oz).       # Financial/Environmental Concerns:    # COPD: noted on problem list        Diet: Modified diet as above  DVT Prophylaxis: Pneumatic Compression Devices  Cardona Catheter: Not present  Lines: None     Cardiac Monitoring: ORDERED  Code Status: DNR/DNI         Disposition Plan   Inpatient status.  Anticipate greater than 2 evening hospitalization while undergoing continued work-up/management of suspected CVA with right upper extremity weakness.      Medically Ready for  Discharge: Anticipated in 2-4 Days    The patient's care was discussed with the Bedside Nurse, Patient, and Dr. Remy .  Reviewed ED notes and recent Discharge Summary .      The patient has been discussed with Dr. Villasenor, who agrees with the assessment and plan at this time.    Tavares Magallon PA-C  [facility]  Securely message with the Vocera Web Console (learn more here)    ______________________________________________________________________    Chief Complaint   Right arm/sided weakness    History is obtained from Dr. Remy, the patient and EMR.      History of Present Illness   Ron Gilmore is a 84 year old male admitted on 10/29/2024 due to suspected CVA with right sided weakness.      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    Patient presented to the ED due to right arm weakness (unable to lift up against gravity).  This developed this morning and he was last known well ~around 8PM the night prior to presentation.  Staff at his facility called EMS.      Work-up in the ED included a BMP that revealed a chloride of 97, CO2 of 33, glucose of 133 otherwise unremarkable.  CBC with differential revealed a hemoglobin of 10.7, hematocrit of 36, RBC count of 4.14, MCH of 25.8, MCHC of 29.7, RDW of 17.3 otherwise unremarkable.  INR and PTT were within normal limits.  High-sensitivity troponin T was elevated at 26.  EKG showed sinus rhythm with right bundle branch block.    Head CT without contrast was negative for acute intercranial hemorrhage, no hydrocephalus or extra-axial hemorrhage, no midline shift or mass effect noted, multifocal remote lacunar infarcts in the  bilateral basal ganglia is noted, intracranial atheromatous disease is present, mild to moderate global cortical volume loss with ex vacuo dilatation of the ventricular system and chronic small vessel ischemic changes and periventricular distribution.  Head/neck CTA with contrast was negative for large vessel occlusion (intracranially), no high-grade stenosis noted in the neck.  Perfusion CT of the head with contrast with noted motion degradation which markedly limits assessment.  One-view portable chest x-ray with mild bibasilar opacities (may be related to atelectasis or an infection) Loop recorder devices project over the left hemothorax laterally.    Loading dose of Plavix 300 mg and full dose aspirin (325 mg) have been ordered but yet to be administered.    Patient was seen in the ED where he was resting comfortably on the gurney upon arrival.  We reviewed events that led to patient's presentation to the ED.  He confirmed that he was able to move his right arm without any difficulty last night around 8 PM.  He awoke this morning and has been unable to lift his arm or move his arm.  He has not had any change or difficulty with the movement of his hand or wrist and indicates it is just from his wrist to his shoulder.    Upon questioning, patient stated that he has been experiencing some headaches recently in the morning.  He stated it might be from watching too much TV and having eyestrain.  He also stated he had some bleeding in his right ear a few weeks ago that is since resolved.  Patient described having recent pneumonia and being on antibiotics (confirmed with facility MAR that he is no longer on antibiotic therapy).  He is on chronic supplemental oxygen at 2 L/min and is been on this since his stroke diagnosis.  Patient has a frequent cough that is nonproductive and he indicates that phlegm/mucus gets stuck in the back of his throat and he is unable to cough it up or swallow it down.  He has ongoing issues  "with constipation.  He has a chronic Chirinos catheter.  Patient does develop some vertigo occasionally when using the Terrie lift at the facility.    Patient resides at the [facility] the [facility].  He is a former smoker which he stated he smoked \"way too much for way too long\".  He stopped smoking in 1998.  Patient indicated that he has been sober from alcohol for approximately 43 years.  He does not use recreational drugs.  He does not ambulate he is lifted out of bed with a Terrie and utilizes a scooter which he controls with his right upper extremity.  He is chronically on supplemental oxygen at 2 L/min.  He had previously been prescribed a CPAP but did not tolerate this and does not utilize this any more.    Discussed and reviewed CODE STATUS and patient elected to be DNR/DNI.      Past Medical History    I have reviewed this patient's medical history and updated it with pertinent information if needed.   Past Medical History:   Diagnosis Date    BPH (benign prostatic hyperplasia)     Cataract     Cholelithiasis     COPD (chronic obstructive pulmonary disease) (H)     Depression     Diabetes mellitus     Type 2    Dyshidrotic foot dermatitis     Edema     Gout     Hyperlipidemia     Hypertension     Infection due to 2019 novel coronavirus 5/1/2021    Kidney stones     Bladder Stones    Lumbago     Lumbar disc displacement without myelopathy     Muscle weakness     Neuropathy, diabetic (H)     Obesity     Spinal stenosis     Stroke (H)     with residual effects- weakness LUE> LLE    Unsteady gait     Urinary retention with incomplete bladder emptying     UTI (urinary tract infection)     Vasovagal episode    Residual left sided 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    Prior to Admission Medications   Prior to Admission Medications   Prescriptions Last Dose Informant Patient Reported? Taking?   Emollient (AMLACTIN ULTRA EX)  Nursing Home Yes No   Sig: Apply topically daily as needed To feet   Skin Protectants, Misc. (LANTISEPTIC SKIN PROTECTANT EX)  Nursing Home Yes No   Sig: Externally apply topically 2 times daily as needed Apply a thin film to vincent area/buttocks   Skin Protectants, Misc. (LANTISEPTIC SKIN PROTECTANT EX)  Nursing Home Yes No   Sig: Externally apply topically 2 times daily Apply a thin film to vincent area/buttocks   Vitamin D3 (VITAMIN D, CHOLECALCIFEROL,) 25 mcg (1000 units) tablet  Nursing Home Yes No   Sig: Take 1 tablet by mouth daily 0800   acetaminophen (TYLENOL) 325 MG tablet  Nursing Home Yes No   Sig: Take 650 mg by mouth every 4 hours as needed for mild pain as needed for pain/fever Max dose 3000mg/24hr   allopurinol (ZYLOPRIM) 300 MG tablet  Nursing Home Yes No   Sig: Take 300 mg by mouth every morning 0900   ammonium lactate (LAC-HYDRIN) 12 % external lotion  Nursing Home Yes No   Sig: Apply topically daily Apply a thin film to bilateral feet and legs   aspirin (ASA) 81 MG EC tablet  Nursing Home No No   Sig: Take 1 tablet (81 mg) by mouth daily   atorvastatin (LIPITOR) 10 MG tablet  Nursing Home Yes No   Sig: Take 10 mg by mouth every morning 0900   bisacodyl (DULCOLAX) 10 MG suppository   No No   Sig: Place 1 suppository (10 mg) rectally every 72 hours.   formoterol (PERFOROMIST) 20 MCG/2ML neb solution  Nursing Home Yes No   Sig: Take 20 mcg by nebulization every 12 hours 1000, 2300   furosemide (LASIX) 20 MG tablet  Nursing Home Yes No   Sig: Take 20 mg by mouth daily 0900   hypromellose (ARTIFICIAL TEARS) 0.5 % SOLN ophthalmic solution  Nursing Home Yes No   Sig: Place 2 drops into both eyes 2 times daily 0800, 2000   hypromellose (ARTIFICIAL TEARS) 0.5 % SOLN ophthalmic solution  Nursing Home Yes No   Sig: Place 2 drops into both  eyes 2 times daily as needed for dry eyes   ipratropium - albuterol 0.5 mg/2.5 mg/3 mL (DUONEB) 0.5-2.5 (3) MG/3ML neb solution  Nursing Home Yes No   Sig: Take 1 vial by nebulization 3 times daily as needed for shortness of breath, wheezing or cough   ipratropium-albuterol (COMBIVENT RESPIMAT)  MCG/ACT inhaler  Nursing Home Yes No   Sig: Inhale 1 puff into the lungs 4 times daily 0900, 1200 ,1600 ,2000   loperamide (IMODIUM) 2 MG capsule  Nursing Home Yes No   Sig: Take 2 mg by mouth every 6 hours as needed for diarrhea   melatonin 3 MG tablet   Yes No   Sig: Take 3 mg by mouth at bedtime.   methenamine hippurate (HIPREX) 1 g tablet  Nursing Home Yes No   Sig: Take 1 g by mouth 2 times daily 0900, 2000   metoprolol tartrate (LOPRESSOR) 25 MG tablet  Nursing Home No No   Sig: Take 0.5 tablets (12.5 mg) by mouth 2 times daily   pantoprazole (PROTONIX) 40 MG EC tablet  Nursing Home Yes No   Sig: Take 40 mg by mouth 2 times daily 0900, 2000   senna-docusate (SENOKOT-S/PERICOLACE) 8.6-50 MG tablet  Nursing Home Yes No   Sig: Take 1 tablet by mouth 2 times daily 0900, 2000   senna-docusate (SENOKOT-S/PERICOLACE) 8.6-50 MG tablet  Nursing Home Yes No   Sig: Take 1 tablet by mouth daily as needed for constipation   sertraline (ZOLOFT) 50 MG tablet  Nursing Home Yes No   Sig: Take 75 mg by mouth daily. 0900   simethicone (MYLICON) 125 MG chewable tablet  Nursing Home Yes No   Sig: Take 125 mg by mouth 3 times daily as needed for intestinal gas      Facility-Administered Medications: None     Allergies   No Known Allergies    Physical Exam   Vital Signs: Temp: 97.4  F (36.3  C) Temp src: Temporal BP: 125/72 Pulse: 70   Resp: 15 SpO2: 92 % O2 Device: None (Room air)    Weight: 224 lbs 13.91 oz    Constitutional: Awake, alert, cooperative, no apparent distress.    ENT: Normocephalic, without obvious abnormality, atraumatic, oral pharynx with moist mucus membranes.  Eyes extra occular movements intact.  Normal sclera.     Neck: Supple, symmetrical, trachea midline, no adenopathy.  Pulmonary: Supplemental O2 at 2 L/m.  No increased work of breathing, fair air exchange, clear to auscultation bilaterally, no crackles or wheezing.  Cardiovascular: Regular rate and rhythm, normal S1 and S2, no S3 or S4, and no murmur noted.  GI: Normal bowel sounds, soft, non-distended, non-tender.  Obese.  Skin/Integumen: Visualized skin appeared clear.  Neuro: CN II-XII grossly intact.  Unable to lift right upper extremity off the gurney or maintain in the air if passively raised.  Right hand strength/ strength intact.  Right lower extremity strength intact.  Flaccid paresis of the left upper and lower extremities.    Psych:  Alert and oriented x 3. Normal affect.  Extremities: No lower extremity edema noted, and calves are non-tender to palpation bilaterally.     Medical Decision Making       Please see A&P for additional details of medical decision making.  75 MINUTES SPENT BY ME on the date of service doing chart review, history, exam, documentation & further activities per the note.       Data   Data reviewed today: I reviewed all medications, new labs and imaging results over the last 24 hours. I personally reviewed the EKG tracing showing sinus rhythm with right BBB .      I have personally reviewed the following data over the past 24 hrs:    8.5  \   10.7 (L)   / 224     138 97 (L) 19.3 /  133 (H)   4.4 33 (H) 0.95 \     Trop: 26 (H) BNP: N/A     INR:  1.10 PTT:  28   D-dimer:  N/A Fibrinogen:  N/A       Imaging results reviewed over the past 24 hrs:   Recent Results (from the past 24 hours)   CT Head w/o Contrast    Narrative    CT SCAN OF THE HEAD WITHOUT CONTRAST   10/29/2024 10:06 AM     HISTORY: Code Stroke to evaluate for potential thrombolysis and  thrombectomy. PLEASE READ IMMEDIATELY.    TECHNIQUE:  Axial images of the head and coronal reformations without  IV contrast material. Radiation dose for this scan was reduced  using  automated exposure control, adjustment of the mA and/or kV according  to patient size, or iterative reconstruction technique.    COMPARISON: 9/20/2023    FINDINGS: No midline shift or mass effect. No acute intracranial  hemorrhage. No hydrocephalus or extra-axial hemorrhage. Multifocal  remote lacunar infarcts in the bilateral basal ganglia. Intracranial  atheromatous disease is present. No clear evidence of acute loss of  gray-white differentiation. Mild to moderate global cortical volume  loss with ex vacuo dilatation of the ventricular system. Chronic small  vessel ischemic changes in a periventricular distribution.      Impression    IMPRESSION:   No acute intracranial hemorrhage.      TAURUS HAGEN MD         SYSTEM ID:  [id number]   CTA Head Neck with Contrast    Narrative    CT ANGIOGRAM OF THE HEAD AND NECK WITH CONTRAST  10/29/2024 10:07 AM     HISTORY: Code Stroke to evaluate for potential thrombolysis and  thrombectomy. PLEASE READ IMMEDIATELY.    TECHNIQUE:  CT angiography with an injection of 67mL Isovue-370 IV  with scans through the head and neck. Images were transferred to a  separate 3-D workstation where multiplanar reformations and 3-D images  were created. Estimates of carotid stenoses are made relative to the  distal internal carotid artery diameters except as noted. Radiation  dose for this scan was reduced using automated exposure control,  adjustment of the mA and/or kV according to patient size, or iterative  reconstruction technique.    COMPARISON: CT brain from the same day.     CT HEAD FINDINGS: No contrast enhancing lesions. Cerebral blood flow  is grossly normal.     CT ANGIOGRAM HEAD FINDINGS: The anterior and middle cerebral arterial  distributions are without vascular occlusion. The A1 and M1 segments  are preserved without vascular occlusion. No evidence of high-grade  stenosis. There are findings of atheromatous disease involving the  petrous and cavernous segments of the  internal carotid arteries    The vertebrobasilar system is unremarkable. The vertebral arteries  intracranially demonstrates some mild atheromatous changes without  high-grade stenosis.      CT ANGIOGRAM NECK FINDINGS:   2 vessel aortic arch. No high-grade stenosis in the neck. There are  some mild atheromatous changes at the origins of the internal carotid  arteries with no hemodynamically significant stenosis. The vertebral  arteries are unremarkable throughout their course in the neck     Other findings: Emphysematous changes in the lung apices..       Impression    IMPRESSION: No large vessel occlusion findings intracranially. No  high-grade stenosis in the neck.    Case discussed with Dr. Remy at 10:17 AM CST.        TAURUS HAGEN MD         SYSTEM ID:  [id number]   CT Head Perfusion w Contrast - For Tier 2 Stroke    Narrative    CT BRAIN PERFUSION 10/29/2024 10:19 AM    HISTORY: Code Stroke to evaluate for potential thrombolysis and  thrombectomy. Evaluate mismatch between penumbra and core infarct.  READ IMMEDIATELY.    TECHNIQUE: Time sequential axial CT images of the head were acquired  during the administration of 117ml isovue 370 IV. Color perfusion maps  of the brain were created from this time sequential axial source data.      Radiation dose for this scan was reduced using automated exposure  control, adjustment of the mA and/or kV according to patient size, or  iterative reconstruction technique.    COMPARISON: CT brain and CTA head and neck.    FINDINGS: Please note the study is significantly degraded by motion  artifact which limits assessment. Areas identified on the rapid  software correspond to extracranial structures and do not reflect  tissue at risk most likely. A large region of the identified TMAX  abnormality corresponds to bifrontal extra-axial spaces and  extra-axial spaces at the cranial apex.      Impression    IMPRESSION: There is motion degradation of the examination. This  markedly  limits assessment. While there is no clear evidence of  significant tissue at risk, the rapid software identifies areas that  do not correspond to brain tissue. Recommend that treatment decisions  based upon imaging should not be based on the CT perfusion but rather  the CT brain and CTA of the neck.    TAURUS HAGEN MD         SYSTEM ID:  [id number]   XR Chest Port 1 View    Narrative    CHEST ONE VIEW PORTABLE   10/29/2024 10:23 AM     HISTORY:  Cough.    COMPARISON: 8/20/2024.      Impression    IMPRESSION: No significant interval change. Shallow inspiration. Mild  bibasilar opacities may be related to atelectasis or infection. No  pneumothorax. Heart size is stable. Pulmonary vascularity is within  normal limits. Loop recorder device is projected over the left  hemithorax laterally.

## 2024-10-29 NOTE — PROGRESS NOTES
Speech-Language Pathology: Clinical Swallow Evaluation     10/29/24 6173   Appointment Info   Signing Clinician's Name / Credentials (SLP) An Rizzo MS CCC-SLP   General Information   Onset of Illness/Injury or Date of Surgery 10/29/24   Referring Physician Tavares Magallon PA-C   Patient/Family Therapy Goal Statement (SLP) To eat/drink   Pertinent History of Current Problem The pt is an is a 84 year old male admitted on 10/29/2024 due to suspected CVA with right sided weakness.       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     Patient presented to the ED due to right arm weakness (unable to lift up against gravity).  This developed this morning and he was last known well ~around 8PM the night prior to presentation.  Staff at his facility called EMS.       Work-up in the ED included a BMP that revealed a chloride of 97, CO2 of 33, glucose of 133 otherwise unremarkable.  CBC with differential revealed a hemoglobin of 10.7, hematocrit of 36, RBC count of 4.14, MCH of 25.8, MCHC of 29.7, RDW of 17.3 otherwise unremarkable.  INR and PTT were within normal limits.  High-sensitivity troponin T was elevated at 26.  EKG showed sinus rhythm with right bundle branch block.     Head CT without contrast was negative for acute intercranial hemorrhage, no hydrocephalus or extra-axial hemorrhage, no midline shift or mass effect noted, multifocal remote lacunar infarcts in the bilateral basal ganglia is noted, intracranial atheromatous disease is present, mild to moderate global cortical volume loss with ex vacuo dilatation of the ventricular system and chronic small vessel ischemic changes and periventricular  distribution.  Head/neck CTA with contrast was negative for large vessel occlusion (intracranially), no high-grade stenosis noted in the neck.  Perfusion CT of the head with contrast with noted motion degradation which markedly limits assessment.  One-view portable chest x-ray with mild bibasilar opacities (may be related to atelectasis or an infection) Loop recorder devices project over the left hemothorax laterally.  Swallow evaluation ordered per MD. Pt going to MRI after evaluation.   General Observations The pt is alert, pleasant, and agreeable to evaluation. Pt is a reliable historian.   Pain Assessment   Patient Currently in Pain No   Type of Evaluation   Type of Evaluation Swallow Evaluation   Oral Motor   Oral Musculature anomalies present   Structural Abnormalities none present   Mucosal Quality dry   Dentition (Oral Motor)   Comment, Dentition (Oral Motor) pt reports that he only puts in bottom dentures if he is eating something really tough   Dentition (Oral Motor) dental appliance/dentures   Dental Appliance/Denture (Oral Motor) upper and lower;dentures, full;only used when eating   Facial Symmetry (Oral Motor)   Facial Symmetry (Oral Motor) bilateral impairment   Comment, Facial Symmetry (Oral Motor) left > right side droop   Bilateral Facial Asymmetry left side;moderate impairment;right side;minimal impairment   Lip Function (Oral Motor)   Lip Range of Motion (Oral Motor) protrusion impairment   Lip Strength (Oral Motor) left side;mild impairment   Lip Coordination (Oral Motor) bilateral;minimal impairment   Protrusion, Lip Range of Motion left side;minimal impairment   Tongue Function (Oral Motor)   Tongue ROM (Oral Motor) WNL   Jaw Function (Oral Motor)   Jaw Function (Oral Motor) WNL   Facial Sensation   Facial Sensation WNL  (pt reports cysts under cheek skin)   Cough/Swallow/Gag Reflex (Oral Motor)   Volitional Throat Clear/Cough (Oral Motor) impaired;reduced strength   Volitional Swallow (Oral  Motor) mildly delayed;effortful;weak   Vocal Quality/Secretion Management (Oral Motor)   Vocal Quality (Oral Motor) WFL   Secretion Management (Oral Motor) WNL   Comment, Vocal Quality/Secretion Management (Oral Motor) Mild dysrthria present   General Swallowing Observations   Past History of Dysphagia Last VFSS 9/22/23 - Video swallow study completed: there was flash penetration of mildly thick liquids due to premature entry with large sips, but no aspiration.  Recommend continue minced and moist diet with mildly thick liquids, patient feeding self small bites/sips, upright at 90 degrees, ensure enough moisture with foods like scrambled eggs (should be a cohesive bite with sauce added as needed), double swallow to clear oropharyngeal residue with each bite and sip. Pt seen last month for swallow evaluation/tx and recommended the same diet. Pt reports that he uses thickener 100% of the time with liquids unless it is Boost, as that is naturally thicker. He reports that he follows a minced diet, but notes coughing/choking if he tries regular foods. He is aware that he currently has pneumonia, but does not recall recent aspiration/choking episode.   Respiratory Support nasal cannula  (2 lpm)   Current Diet/Method of Nutritional Intake (General Swallowing Observations, NIS) mildly thick (nectar-thick) liquids (level 2);minced and moist (dysphagia mechanically altered) (level 5)   Swallowing Evaluation Clinical swallow evaluation   Clinical Swallow Evaluation   Feeding Assistance frequent cues/help required   Clinical Swallow Evaluation Textures Trialed mildly thick liquids;moderately thick liquids/liquidized;pureed   Clinical Swallow Eval: Mildly Thick Liquids   Mode of Presentation spoon;self-fed   Volume Presented x1 tsp   Oral Phase premature pharyngeal entry   Pharyngeal Phase coughing/choking;feeling of something stuck in throat;reduction in laryngeal movement;repeated swallows;wet vocal quality after swallow    Diagnostic Statement Overt s/s of aspiration including coughing, wet voicing, multiple swallows, and pt endorsing aspiration. Suspect poor oral bolus control with premature spillage to pharynx   Clinical Swallow Eval: Moderately Thick Liquids   Mode of Presentation spoon;self-fed   Volume Presented x3   Oral Phase premature pharyngeal entry   Pharyngeal Phase coughing/choking;repeated swallows;throat clearing   Diagnostic Statement Overt s/s of aspiration despite small bolus in 2/3 opps, including coughing, throat clearing and sensation of aspiration   Clinical Swallow Evaluation: Puree Solid Texture Trial   Mode of Presentation, Puree spoon;fed by clinician   Volume of Puree Presented x3   Oral Phase, Puree WFL   Pharyngeal Phase, Puree repeated swallows   Diagnostic Statement Double swallows with 1/3 opps, otherwise no s/s of aspiration   Esophageal Phase of Swallow   Patient reports or presents with symptoms of esophageal dysphagia No   Swallowing Recommendations   Diet Consistency Recommendations NPO   Medication Administration Recommendations, Swallowing (SLP) recommend non-oral means, essential meds crushed in puree if needed   Instrumental Assessment Recommendations VFSS (videofluoroscopic swallowing study)   General Therapy Interventions   Planned Therapy Interventions Dysphagia Treatment   Dysphagia treatment Oropharyngeal exercise training;Modified diet education;Instruction of safe swallow strategies;Compensatory strategies for swallowing   Clinical Impression   Criteria for Skilled Therapeutic Interventions Met (SLP Eval) Yes, treatment indicated   SLP Diagnosis Suspect at least mod-severe oropharyngeal dysphagia   Risks & Benefits of therapy have been explained evaluation/treatment results reviewed;care plan/treatment goals reviewed;risks/benefits reviewed;current/potential barriers reviewed;participants voiced agreement with care plan;participants included;patient   Clinical Impression Comments  Clinical swallow evaluation completed per MD order. The pt presents with suspect at least mod-severe oropharyngeal dysphagia. Oral mech signficant for bilateral facial drooping, left greater than right (left is baseline), reduced lingual/labial cohesion, and weak congested coughing. Pt assessed with small amounts of mildly thick, mod thick, and puree solids. Oral phase significant for reduced bolus cohesion and suspected premature spillage to pharynx. Overt s/s of aspiration present with mildly thick and mod thick despite small amounts. Pt had coughing, throat clearing, and endorsed sensation of aspiration. Pt does have cough in absence of PO, however this cough was immediately after initation of swallow and appeared directly related to PO. No s/s of aspiration with puree except for double swallow x1, however suspect some risk given severity of dificulty with liquids. Recommend NPO with non-oral means of medication. Recommend VFSS to further assess pharyngeal phase given hx of dysphagia, new deficits with inc s/s of aspiration, and current pneumonia with coughing at baseline. SLP will initiate services.   SLP Total Evaluation Time   Eval: oral/pharyngeal swallow function, clinical swallow Minutes (48120) 13   SLP Discharge Planning   SLP Plan VFSS, PO readiness   SLP Discharge Recommendation Transitional Care Facility   SLP Rationale for DC Rec Swallow function is significantly below baseline   SLP Brief overview of current status  Recommend NPO with non-oral means of medication. Recommend VFSS to further assess pharyngeal phase given hx of dysphagia, new deficits with inc s/s of aspiration, and current pneumonia with coughing at baseline. SLP will initiate services.   SLP Time and Intention   Total Session Time (sum of timed and untimed services) 21

## 2024-10-30 ENCOUNTER — APPOINTMENT (OUTPATIENT)
Dept: SPEECH THERAPY | Facility: CLINIC | Age: 84
DRG: 556 | End: 2024-10-30
Payer: MEDICARE

## 2024-10-30 ENCOUNTER — APPOINTMENT (OUTPATIENT)
Dept: OCCUPATIONAL THERAPY | Facility: CLINIC | Age: 84
DRG: 556 | End: 2024-10-30
Attending: PHYSICIAN ASSISTANT
Payer: MEDICARE

## 2024-10-30 ENCOUNTER — APPOINTMENT (OUTPATIENT)
Dept: GENERAL RADIOLOGY | Facility: CLINIC | Age: 84
DRG: 556 | End: 2024-10-30
Attending: PHYSICIAN ASSISTANT
Payer: MEDICARE

## 2024-10-30 LAB
GLUCOSE BLDC GLUCOMTR-MCNC: 106 MG/DL (ref 70–99)
GLUCOSE BLDC GLUCOMTR-MCNC: 110 MG/DL (ref 70–99)
GLUCOSE BLDC GLUCOMTR-MCNC: 110 MG/DL (ref 70–99)
GLUCOSE BLDC GLUCOMTR-MCNC: 123 MG/DL (ref 70–99)

## 2024-10-30 PROCEDURE — 250N000013 HC RX MED GY IP 250 OP 250 PS 637: Performed by: INTERNAL MEDICINE

## 2024-10-30 PROCEDURE — 92611 MOTION FLUOROSCOPY/SWALLOW: CPT | Mod: GN | Performed by: SPEECH-LANGUAGE PATHOLOGIST

## 2024-10-30 PROCEDURE — 74230 X-RAY XM SWLNG FUNCJ C+: CPT

## 2024-10-30 PROCEDURE — 120N000001 HC R&B MED SURG/OB

## 2024-10-30 PROCEDURE — 99207 PR NO BILLABLE SERVICE THIS VISIT: CPT

## 2024-10-30 PROCEDURE — 258N000003 HC RX IP 258 OP 636: Performed by: PHYSICIAN ASSISTANT

## 2024-10-30 PROCEDURE — 999N000157 HC STATISTIC RCP TIME EA 10 MIN

## 2024-10-30 PROCEDURE — 250N000009 HC RX 250: Performed by: PHYSICIAN ASSISTANT

## 2024-10-30 PROCEDURE — 250N000013 HC RX MED GY IP 250 OP 250 PS 637: Performed by: PHYSICIAN ASSISTANT

## 2024-10-30 PROCEDURE — 97110 THERAPEUTIC EXERCISES: CPT | Mod: GO

## 2024-10-30 PROCEDURE — 94640 AIRWAY INHALATION TREATMENT: CPT | Mod: 76

## 2024-10-30 PROCEDURE — 97165 OT EVAL LOW COMPLEX 30 MIN: CPT | Mod: GO

## 2024-10-30 PROCEDURE — 92526 ORAL FUNCTION THERAPY: CPT | Mod: GN | Performed by: SPEECH-LANGUAGE PATHOLOGIST

## 2024-10-30 PROCEDURE — 94640 AIRWAY INHALATION TREATMENT: CPT

## 2024-10-30 RX ORDER — BARIUM SULFATE 400 MG/ML
SUSPENSION ORAL ONCE
Status: COMPLETED | OUTPATIENT
Start: 2024-10-30 | End: 2024-10-30

## 2024-10-30 RX ADMIN — SODIUM CHLORIDE: 900 INJECTION, SOLUTION INTRAVENOUS at 07:17

## 2024-10-30 RX ADMIN — IPRATROPIUM BROMIDE AND ALBUTEROL 1 PUFF: 20; 100 SPRAY, METERED RESPIRATORY (INHALATION) at 20:04

## 2024-10-30 RX ADMIN — FORMOTEROL FUMARATE DIHYDRATE 20 MCG: 20 SOLUTION RESPIRATORY (INHALATION) at 07:53

## 2024-10-30 RX ADMIN — DEXTRAN 70, GLYCERIN, HYPROMELLOSE 2 DROP: 1; 2; 3 SOLUTION/ DROPS OPHTHALMIC at 20:04

## 2024-10-30 RX ADMIN — METOPROLOL TARTRATE 12.5 MG: 25 TABLET, FILM COATED ORAL at 20:04

## 2024-10-30 RX ADMIN — IPRATROPIUM BROMIDE AND ALBUTEROL 1 PUFF: 20; 100 SPRAY, METERED RESPIRATORY (INHALATION) at 10:21

## 2024-10-30 RX ADMIN — PANTOPRAZOLE SODIUM 40 MG: 40 INJECTION, POWDER, FOR SOLUTION INTRAVENOUS at 10:21

## 2024-10-30 RX ADMIN — SENNOSIDES AND DOCUSATE SODIUM 2 TABLET: 50; 8.6 TABLET ORAL at 15:44

## 2024-10-30 RX ADMIN — METOPROLOL TARTRATE 12.5 MG: 25 TABLET, FILM COATED ORAL at 15:44

## 2024-10-30 RX ADMIN — BARIUM SULFATE 25 ML: 400 SUSPENSION ORAL at 09:04

## 2024-10-30 RX ADMIN — SENNOSIDES AND DOCUSATE SODIUM 2 TABLET: 50; 8.6 TABLET ORAL at 20:04

## 2024-10-30 RX ADMIN — DEXTRAN 70, GLYCERIN, HYPROMELLOSE 2 DROP: 1; 2; 3 SOLUTION/ DROPS OPHTHALMIC at 10:20

## 2024-10-30 RX ADMIN — IPRATROPIUM BROMIDE AND ALBUTEROL 1 PUFF: 20; 100 SPRAY, METERED RESPIRATORY (INHALATION) at 17:24

## 2024-10-30 RX ADMIN — FORMOTEROL FUMARATE DIHYDRATE 20 MCG: 20 SOLUTION RESPIRATORY (INHALATION) at 19:40

## 2024-10-30 RX ADMIN — BARIUM SULFATE 45 ML: 400 SUSPENSION ORAL at 09:03

## 2024-10-30 RX ADMIN — ASPIRIN 81 MG CHEWABLE TABLET 81 MG: 81 TABLET CHEWABLE at 15:44

## 2024-10-30 RX ADMIN — PANTOPRAZOLE SODIUM 40 MG: 40 INJECTION, POWDER, FOR SOLUTION INTRAVENOUS at 20:04

## 2024-10-30 RX ADMIN — SERTRALINE HYDROCHLORIDE 75 MG: 50 TABLET ORAL at 15:44

## 2024-10-30 RX ADMIN — IPRATROPIUM BROMIDE AND ALBUTEROL 1 PUFF: 20; 100 SPRAY, METERED RESPIRATORY (INHALATION) at 13:39

## 2024-10-30 RX ADMIN — ALLOPURINOL 300 MG: 300 TABLET ORAL at 15:44

## 2024-10-30 RX ADMIN — ATORVASTATIN CALCIUM 10 MG: 10 TABLET, FILM COATED ORAL at 15:44

## 2024-10-30 RX ADMIN — METHENAMINE HIPPURATE 1 G: 1 TABLET ORAL at 20:04

## 2024-10-30 RX ADMIN — METHENAMINE HIPPURATE 1 G: 1 TABLET ORAL at 15:44

## 2024-10-30 RX ADMIN — BISACODYL 10 MG: 10 SUPPOSITORY RECTAL at 10:19

## 2024-10-30 NOTE — PROGRESS NOTES
"MD Notification    Notified Person: MD    Notified Person Name: Huma Azar.     Notification Date/Time: 10/30  0436    Notification Interaction: Malia and [place].    Purpose of Notification: \"I am noticing new ataxia with RUE and pt is reporting RUE feeling heavier. Please advise.\"    Orders Received:    MD Notification    Notified Person: MD    Notified Person Name: Ok aDvey     Notification Date/Time: 10/30 0541.     Notification Interaction: Malia.     Purpose of Notification: \"Just letting you know pt has new ataxia with RUE and pt reporting RUE feeling heavier, 3/5. Please advise.\"    Orders Received: \"Call an RRT\"    Comments:      "

## 2024-10-30 NOTE — CONSULTS
Care Management Initial Consult    General Information  Assessment completed with: Care Team Member, Patient, Nurse Gardenia  Type of CM/SW Visit: Initial Assessment    Primary Care Provider verified and updated as needed: Yes   Readmission within the last 30 days: current reason for admission unrelated to previous admission      Reason for Consult: discharge planning  Advance Care Planning: Advance Care Planning Reviewed: present on chart        Communication Assessment  Patient's communication style: spoken language (English or Bilingual)        Cognitive  Cognitive/Neuro/Behavioral: WDL  Level of Consciousness: alert  Arousal Level: opens eyes spontaneously  Orientation: oriented x 4  Mood/Behavior: calm, cooperative  Best Language: 0 - No aphasia  Speech: clear, spontaneous, logical    Living Environment:   People in home: facility resident     Current living Arrangements: extended care facility  Name of Facility: [facility]   Able to return to prior arrangements: yes     Family/Social Support:  Care provided by: other (see comments)  Provides care for: no one  Marital Status:   Support system: Children, Facility resident(s)/Staff          Description of Support System: Supportive, Involved    Support Assessment: Adequate family and caregiver support    Current Resources:   Patient receiving home care services: No  Community Resources: None  Equipment currently used at home: lift device, wheelchair, power    Employment/Financial:  Employment Status: retired     Financial Concerns: none   Referral to Financial Worker: No     Does the patient's insurance plan have a 3 day qualifying hospital stay waiver?  No    Lifestyle & Psychosocial Needs:  Social Drivers of Health     Food Insecurity: Not on file   Depression: Not on file   Housing Stability: Not on file   Tobacco Use: Medium Risk (7/15/2024)    Patient History     Smoking Tobacco Use: Former     Smokeless Tobacco Use: Never      Passive Exposure: Not on file   Financial Resource Strain: Not on file   Alcohol Use: Not on file   Transportation Needs: Not on file   Physical Activity: Not on file   Interpersonal Safety: Not on file   Stress: Not on file   Social Connections: Not on file   Health Literacy: Not on file     Functional Status:  Prior to admission patient needed assistance:   Dependent ADLs:: Dressing, Bathing, Grooming, Incontinence, Transfers, Wheelchair-with assist, Toileting  Dependent IADLs:: Cleaning, Cooking, Laundry, Medication Management, Transportation, Money Management     Mental Health Status:  Mental Health Status: No Current Concerns     Chemical Dependency Status:  Chemical Dependency Status: No Current Concerns           Values/Beliefs:  Spiritual, Cultural Beliefs, Mormonism Practices, Values that affect care: no            Discussed  Partnership in Safe Discharge Planning  document with patient/family: No    Additional Information:  SW consulted for discharge planning and completed chart review. Per ED Provider notes, patient presented via EMS with right sided weakness. Patient has been hospitalized recently several times, with the most recent being 10/11-10/15 for constipation and 8/20-8/23 for chronic respiratory failure. Patient discharged back to his home/[facility] after each of these hospitalizations.     Patient resides in assisted living at [facility], so JEROME contacted nurse Gardenia (phone: [phone] extension [phone]) to confirm services and discuss discharge back to them. She reports patient is total cares there, he is mostly bed bound but has an electric scooter and motorized wheelchair and uses a aleks lift. They manage medications and he sees PCP with [facility]. He is in the [place] [facility] and can return tomorrow. Hospitalist confirmed that he will be ready to go. Gardenia states patient always transports by stretcher, chart review confirms that this is the common mode of transport recently.  PCS completed by hand and faxed as patient is unable to hold himself up sitting, is bed bound, on oxygen and has weakness. They are asking patient return around 1500, orders need to be faxed as soon as possible to [phone]. [facility] is scheduled for tomorrow 10/31 with pickup between [id number] to return home to [facility].     Upon further chart review, SW found notes from [facility], SW called them (AINSLEY Brandon [phone]) to see if they provide HHC to patient. They are open only for RN/catheter management, they do not need discharge orders from hospital.     Ashley Blakely, ARMEN, LGSW   Social Work   [facility]

## 2024-10-30 NOTE — CODE/RAPID RESPONSE
Brief House NP Note    RRT called for ataxia/RUE weakness.     Upon my arrival the patient had returned to his admission baseline strength and coordination though patient endorsed that it was still significantly weaker than his prior to admission baseline. Patient stated that he was very tired when RN initially assessed his strength and it improved when he was more awake. Given that patient had a CT, CTA, CT perfusion, and MRI for this same weakness I will not order further imaging now as there have been no acute changes.    Right arm weakness, worsened immediately after waking, improved within minutes  Continues to have waxing/waning symptoms but endorses overall much weaker than his baseline at home. Query hypoperfusion as the weakness seems to be worse when tired or immediately upon waking. Monitor BP overnight to check for peaks/valleys and evaluate for correlation with symptoms.    ELIZABETH Orosco, CNP  Hospitalist - House GISELE  Text me on the [employer] gisele for a textback

## 2024-10-30 NOTE — PLAN OF CARE
Reason for Admission: Suspected CVA with R sided weakness.     Cognitive/Mentation: A/O x4.   Neuros/CMS: Baseline L hemiplegia (able to do plantarflexion and dorsiflexion), R foot drop (able to do plantarflexion not dorsiflexion), LLE/LUE 0/5, RLE 4/5, RUE 3-4/5. Tremor in LUE since prior strokes.   VS: Stable on 2 L NC @ baseline.   Tele: SR with BBB.   GI: LBM PTA.  : Chronic chirinos for retention.   Pulmonary: LS diminished.   Pain: denies.     Drains/Lines: PIV infusing NS @75 mL/hr.  Skin: Blanchable redness area on coccyx with scabbing on R buttock, plan for WOC consult today.   Activity: Assist x2 with lift. Q 2hr turn/repo.   Diet: NPO until video swallow study.     Therapies recs: pending.   Discharge: pending.     Aggression Spotlight Tool: green.    End of shift summary: Plan for VFSS today.

## 2024-10-30 NOTE — PROGRESS NOTES
[facility]    Hospitalist Progress Note    Date of Admission: 10/29/2024    Assessment & Plan   Ron Gilmore is a 84 year old male with PMHx of prior CVA with residual left sided deficits, dysphagia, chronic hypoxic/hypercapnic respiratory failure, urinary retention with chronic chirinos catheter, hypertension, hyperlipidemia, DM II, BPH, peripheral neuropathy, COPD, BRAD, depression, anxiety, gout and chronic LE edema among other medical problems who was admitted on 10/29/2024 for evaluation of right sided weakness.     Fluctuating RUE weakness of unclear etiology, TIA/CVA ruled out  Residual left sided deficit from previous CVA  *Resides in a care facility ([facility] with total cares). Brought to ED on the day of admission for evaluation of right arm weakness. Unclear when the weakness developed, but had been in his usual state of health the night prior. No other new neurologic changes noted.   *In the ED, was afebrile and hemodynamically stable. Labs were generally unremarkable. Trop neg. EKG showed NSR with a RBBB. CXR neg. Initial head CT was neg for acute changes, showed findings of remote infarcts. CTA head/neck was negative for any large vessel occlusions. CT perfusion scan was limited dt motion degradation. Stroke team was contacted, given Plavix load and full dose ASA, plan was to start DAPT on 10/30.   *MRI brain showed extensive chronic changes, no acute ischemia; some small regions of hemosiderin deposition in the brain parenchyma which could be dt ischemic changes vs cerebral amyloid angiopathy.  *RRT called on 10/30 AM for fluctuating RUE neurologic changes. No additional imaging pursued.   -- per stroke team, findings of fluctuating right arm weakness with element of giveaway weakness are not consistent with TIA, MRI brain neg  -- stroke team no longer recommending DAPT, advised to stop Plavix and cont ASA 81mg daily indefinitely  -- remainder of stroke workup unremarkable -- recent  A1c 6.5 in 8/2024, serial trops elevated but flat, FLP pending  -- no additional stroke workup needed this stay, no need for repeat echo  -- goal SBP <130/80  -- OP follow up with PCP after discharge  -- PT/OT, SLP as below -- initial recommendation is for return to [facility] w/assist    Oropharyngeal dysphagia  *On a modified diet at baseline.   *After initial SLP assessment on the day of admission, was kept NPO dt concerns for aspiration. As such, was placed on IVFs.   *Reassessed per SLP on 10/30 with VSS, okay'd for pureed diet and moderately thick liquids.     Hypertension  Hyperlipidemia  Chronic LE edema  *Chronic and stable on statin, metoprolol and Lasix.     DM II, well managed, with peripheral neuropathy  *A1c 6.5 in 8/2024. Not on specific treatment. No need to monitor BG or use sliding scale insulin this stay.     Chronic hypoxic and hypercapnic respiratory failure, on 2L NC at baseline  COPD  BRAD, unable to tolerate CPAP  Recent bibasilar pneumonia  *Recent hospitalization 10/11-10/15 due to bibasilar pneumonia; has completed course of antibiotics.    *Chronic and stable on home inhalers, baseline O2 needs    Urinary retention with chronic chirinos catheter  BPH  *Chronic and stable, cont Hiprex. Routine chirinos cares.   *Abnl UA this stay with mod leuk est, +nitrites and 31 WBCs. UC grew 50-100k pseudomonas aeruginosa. Has grown this organism on prior UCs; given lack of systemic symptoms of infection (fever, leukocytosis), favor findings represent colonization, no indication to treat at this time    Constipation  *Cont sched/prn bowel regimen.    Obesity   *Body mass index is 30.5 kg/m .  Follow up with PCP regarding ongoing management.      Depression, anxiety  *Chronic and stable on sertraline.    Gout  *Chronic and stable on allopurinol.     FEN: IVFs dc'd as okay'd for modified diet per SLP  DVT Prophylaxis: PCDs  Code Status: No CPR- Do NOT Intubate    Disposition: Anticipate discharge back to [facility] tomorrow.  JEROME updated    Medically Ready for Discharge: Anticipated Tomorrow      Maliyahir Peacock Jacklyn, DO    Clinically Significant Risk Factors          # Hypochloremia: Lowest Cl = 97 mmol/L in last 2 days, will monitor as appropriate                   # DMII: A1C = 6.5 % (Ref range: <5.7 %) within past 6 months, PRESENT ON ADMISSION  # Obesity: Estimated body mass index is 30.56 kg/m  as calculated from the following:    Height as of 10/11/24: 1.829 m (6').    Weight as of this encounter: 102.2 kg (225 lb 5 oz)., PRESENT ON ADMISSION     # Financial/Environmental Concerns:    # COPD: noted on problem list          Medical Decision Making       Please see A&P for additional details of medical decision making.         Interval History   Chart reviewed. Seen this afternoon.  Working with SLP.  Alert and conversing appropriately.  Denies complaints.  No chest pain, shortness of breath, cough, abdominal pain, nausea or vomiting.  Moving extremities.  Generally in good spirits.    -Data reviewed today: I reviewed all new labs and imaging results over the last 24 hours. I personally reviewed no images or EKG's today.    Physical Exam   Temp: 97.7  F (36.5  C) Temp src: Oral BP: 137/70 Pulse: 88   Resp: 17 SpO2: 97 % O2 Device: Nasal cannula Oxygen Delivery: 2 LPM  Vitals:    10/29/24 0952 10/29/24 1338   Weight: 102 kg (224 lb 13.9 oz) 102.2 kg (225 lb 5 oz)     Vital Signs with Ranges  Temp:  [97.5  F (36.4  C)-98.5  F (36.9  C)] 97.7  F (36.5  C)  Pulse:  [72-88] 88  Resp:  [17-18] 17  BP: (119-138)/(59-86) 137/70  SpO2:  [95 %-97 %] 97 %  I/O last 3 completed shifts:  In: -   Out: 1375 [Urine:1375]    Constitutional: Resting comfortably, alert and conversing appropriately NAD  Respiratory: CTAB, no wheeze, no increased work of breathing  Cardiovascular: HRRR, no MGR, no LE edema  GI: S, NT, ND, +BS  Skin/Integumen: warm/dry  Neuro: CNs 2-12 intact, +L sided weakness  Other: Cardona catheter draining dark yellow  urine    Medications   Current Facility-Administered Medications   Medication Dose Route Frequency Provider Last Rate Last Admin     Current Facility-Administered Medications   Medication Dose Route Frequency Provider Last Rate Last Admin    [Held by provider] allopurinol (ZYLOPRIM) tablet 300 mg  300 mg Oral QAM Tavares Magallon PA-C        artificial tears (GENTEAL) 0.1-0.2-0.3 % ophthalmic solution 2 drop  2 drop Both Eyes BID Jacklyn Mali Madonna, DO   2 drop at 10/30/24 1020    [Held by provider] aspirin EC tablet 81 mg  81 mg Oral Daily Tavares Magallon PA-C        Or    [Held by provider] aspirin (ASA) chewable tablet 81 mg  81 mg Oral or NG Tube Daily Tavares Magallon PA-C        [Held by provider] atorvastatin (LIPITOR) tablet 10 mg  10 mg Oral QAM Tavares Magallon PA-C        bisacodyl (DULCOLAX) suppository 10 mg  10 mg Rectal Once per day on Monday Wednesday Friday Tavares Magallon PA-C   10 mg at 10/30/24 1019    [Held by provider] clopidogrel (PLAVIX) tablet 75 mg  75 mg Oral or NG Tube Daily Tavares Magallon PA-C        formoterol (PERFOROMIST) neb solution 20 mcg  20 mcg Nebulization Q12H Tavares Magallon PA-C   20 mcg at 10/30/24 0753    ipratropium-albuterol (COMBIVENT RESPIMAT) inhaler 1 puff  1 puff Inhalation 4x Daily Tavares Magallon PA-C   1 puff at 10/30/24 1339    [Held by provider] methenamine hippurate (HIPREX) tablet 1 g  1 g Oral BID Tavares Magallon PA-C        [Held by provider] metoprolol tartrate (LOPRESSOR) half-tab 12.5 mg  12.5 mg Oral BID Tavares Magallon PA-C        [Held by provider] pantoprazole (PROTONIX) EC tablet 40 mg  40 mg Oral BID Tavares Magallon PA-C        pantoprazole (PROTONIX) IV push injection 40 mg  40 mg Intravenous BID Tavares Magallon PA-C   40 mg at 10/30/24 1021    [Held by provider] senna-docusate (SENOKOT-S/PERICOLACE) 8.6-50 MG per tablet 2  tablet  2 tablet Oral BID Tavares Magallon PA-C        [Held by provider] sertraline (ZOLOFT) tablet 75 mg  75 mg Oral Daily Tavares Magallon PA-C        sodium chloride (PF) 0.9% PF flush 3 mL  3 mL Intracatheter Q8H Tavares Magallon PA-C   3 mL at 10/30/24 1339       Data   Recent Labs   Lab 10/30/24  1111 10/30/24  0822 10/30/24  0553 10/29/24  1701 10/29/24  0954   WBC  --   --   --   --  8.5   HGB  --   --   --   --  10.7*   MCV  --   --   --   --  87   PLT  --   --   --   --  224   INR  --   --   --   --  1.10   NA  --   --   --   --  138   POTASSIUM  --   --   --   --  4.4   CHLORIDE  --   --   --   --  97*   CO2  --   --   --   --  33*   BUN  --   --   --   --  19.3   CR  --   --   --   --  0.95   ANIONGAP  --   --   --   --  8   RAJ  --   --   --   --  9.2   * 110* 123*   < > 133*    < > = values in this interval not displayed.       Recent Results (from the past 24 hours)   MR Brain w/o & w Contrast    Narrative    MRI BRAIN WITHOUT AND WITH CONTRAST  10/29/2024 4:20 PM     HISTORY:  Right arm weakness     TECHNIQUE:  Multiplanar, multisequence MRI of the brain without and  with 10 mL Gadavist     COMPARISON: CT brain from earlier in the day.     FINDINGS: No restricted diffusion to suggest acute ischemia. Midline  developmental anatomy is grossly unremarkable. There is overall  moderate to severe global cortical volume loss with ex vacuo  dilatation of the ventricular system. Extensive remote ischemic  findings with remote lacunar infarcts in the bilateral basal ganglia  and thalamic distributions. Multiple remote lacunar infarcts in the  midbrain and pontine medullary distribution. No acute intracranial  hemorrhage. No hydrocephalus or extra-axial hemorrhage. Susceptibility  imaging demonstrates a few foci of hemosiderin deposition in the  bilateral cerebellar hemispheres as well as in the basal ganglia and  thalamic distributions. Postcontrast imaging demonstrates no  abnormal  intraparenchymal enhancement.      Impression    IMPRESSION: Extensive chronic changes as detailed above. No evidence  of abnormal enhancement. No evidence of restricted diffusion to  suggest acute ischemia..    Some small regions of hemosiderin deposition are noted within the  brain parenchyma which could be the result of remote ischemic change  with associated small amounts of hemosiderin versus cerebral amyloid  angiopathy.          TAURUS HAGEN MD         SYSTEM ID:  [id number]   XR Video Swallow with SLP or OT    Narrative    VIDEO SWALLOWING EVALUATION   10/30/2024 8:58 AM     HISTORY: new possible CVA and hx of dysphagia    COMPARISON: None.    FLUOROSCOPY TIME: 1.8 minutes.      Impression    IMPRESSION:    Thin: No penetration or aspiration.    Slightly thick: No penetration or aspiration.    Mildly thick: No penetration or aspiration.    Moderately thick: No penetration or aspiration.    Puree: No penetration or aspiration.    This study only includes the cervical esophagus. Please see speech  pathology note for further details.    LUÍS STRICKLAND DO         SYSTEM ID:  [id number]

## 2024-10-30 NOTE — PROGRESS NOTES
10/30/24 0912   Appointment Info   Signing Clinician's Name / Credentials (SLP) Saritha Castro MS CCC SLP   General Information   Onset of Illness/Injury or Date of Surgery 10/30/24   Referring Physician Tavares Magallon PA-C   Patient/Family Therapy Goal Statement (SLP) To eat/drink   Pertinent History of Current Problem The pt is an is a 84 year old male admitted on 10/29/2024 due to suspected CVA with right sided weakness.       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     Patient presented to the ED due to right arm weakness (unable to lift up against gravity).  This developed this morning and he was last known well ~around 8PM the night prior to presentation.  Staff at his facility called EMS.       Work-up in the ED included a BMP that revealed a chloride of 97, CO2 of 33, glucose of 133 otherwise unremarkable.  CBC with differential revealed a hemoglobin of 10.7, hematocrit of 36, RBC count of 4.14, MCH of 25.8, MCHC of 29.7, RDW of 17.3 otherwise unremarkable.  INR and PTT were within normal limits.  High-sensitivity troponin T was elevated at 26.  EKG showed sinus rhythm with right bundle branch block.     Head CT without contrast was negative for acute intercranial hemorrhage, no hydrocephalus or extra-axial hemorrhage, no midline shift or mass effect noted, multifocal remote lacunar infarcts in the bilateral basal ganglia is noted, intracranial atheromatous disease is present, mild to moderate global cortical volume loss with ex vacuo dilatation of the ventricular system and chronic small vessel ischemic changes and periventricular distribution.  Head/neck CTA with contrast was negative  for large vessel occlusion (intracranially), no high-grade stenosis noted in the neck.  Perfusion CT of the head with contrast with noted motion degradation which markedly limits assessment.  One-view portable chest x-ray with mild bibasilar opacities (may be related to atelectasis or an infection) Loop recorder devices project over the left hemothorax laterally.  Swallow evaluation ordered per MD. Pt going to MRI after evaluation.   General Observations Pleasant and alert with mild dysarthria   Type of Evaluation   Type of Evaluation Swallow Evaluation   General Swallowing Observations   Swallowing Evaluation Videofluoroscopic swallow study (VFSS)   VFSS Evaluation   Radiologist Dr. Bravo   Views Taken left lateral   Physical Location of Procedure [facility]   VFSS Textures Trialed thin liquids;slightly thick liquids;mildly thick liquids;moderately thick liquids/liquidized;pureed   VFSS Eval: Thin Liquid Texture Trial   Mode of Presentation, Thin Liquid spoon;fed by clinician   Order of Presentation 8   Preparatory Phase poor bolus control   Oral Phase, Thin Liquid premature pharyngeal entry   Bolus Location When Swallow Triggered pyriforms   Pharyngeal Phase, Thin Liquid impaired epiglottic movement   Rosenbek's Penetration Aspiration Scale: Thin Liquid Trial Results 1 - no aspiration, contrast does not enter airway   Diagnostic Statement premature spillage to the pyriform sinuses by spoon np penetration.   VFSS Eval: Slightly Thick Liquids   Mode of Presentation spoon;fed by clinician   Order of Presentation 7   Preparatory Phase poor bolus control   Oral Phase premature pharyngeal entry   Bolus Location When Swallow Triggered pyriforms   Pharyngeal Phase impaired epiglottic movement   Rosenbek's Penetration Aspiration Scale 1 - no aspiration, contrast does not enter airway   Diagnostic Statement premature spillage to the pyriform sinuses by spoon np penetration.   VFSS Eval: Mildly Thick Liquids   Mode of Presentation  spoon;cup;fed by clinician   Order of Presentation 3 4 5 11   Preparatory Phase poor bolus control   Oral Phase impaired AP movement;premature pharyngeal entry;residue in oral cavity   Bolus Location When Swallow Triggered posterior laryngeal surface of epiglottis;pyriforms   Pharyngeal Phase impaired epiglottic movement;impaired tongue base retraction   Rosenbek's Penetration Aspiration Scale 1 - no aspiration, contrast does not enter airway   Diagnostic Statement Premature spillage to past tip of the epiglottis via the spoon and too the pyriform sinuses via the cup.   VFSS Eval: Moderately Thick Liquids   Mode of Presentation spoon;cup;fed by clinician   Order of Presentation 1 2 6 10   Preparatory Phase WFL   Oral Phase impaired AP movement;residue in oral cavity;premature pharyngeal entry   Bolus Location When Swallow Triggered posterior laryngeal surface of epiglottis;valleculae   Pharyngeal Phase impaired epiglottic movement;impaired tongue base retraction   Rosenbek's Penetration Aspiration Scale 1 - no aspiration, contrast does not enter airway   Diagnostic Statement Mildly reduced AP movement with premature entry to the valleculae via the spoon and past tip of the epglottis via the cup.   VFSS Evaluation: Puree Solid Texture Trial   Mode of Presentation, Puree spoon;fed by clinician   Order of Presentation 9   Preparatory Phase WFL   Oral Phase, Puree impaired AP movement;residue in oral cavity;premature pharyngeal entry   Bolus Location When Swallow Triggered valleculae   Pharyngeal Phase, Puree impaired epiglottic movement;impaired tongue base retraction   Rosenbek's Penetration Aspiration Scale: Puree Food Trial Results 1 - no aspiration, contrast does not enter airway   Diagnostic Statement Mildly reduced AP movement with minimal/mild BOT residue and delay.   Esophageal Phase of Swallow   Patient reports or presents with symptoms of esophageal dysphagia No   Swallowing Recommendations   Diet Consistency  Recommendations pureed (level 4);moderately thick liquids/liquidized (level 3)   Supervision Level for Intake close supervision needed   Mode of Delivery Recommendations bolus size, small;no straws;slow rate of intake   Postural Recommendations none   Swallowing Maneuver Recommendations alternate food and liquid intake;effortful (hard) swallow   Monitoring/Assistance Required (Eating/Swallowing) stop eating activities when fatigue is present;monitor for cough or change in vocal quality with intake   Medication Administration Recommendations, Swallowing (SLP) Crush meds in puree if unable to crush whole in puree   General Therapy Interventions   Planned Therapy Interventions Dysphagia Treatment   Dysphagia treatment Oropharyngeal exercise training;Modified diet education;Instruction of safe swallow strategies   Clinical Impression   Criteria for Skilled Therapeutic Interventions Met (SLP Eval) Yes, treatment indicated   SLP Diagnosis Moderate oral/pharyngeal dysphagia   Risks & Benefits of therapy have been explained evaluation/treatment results reviewed;care plan/treatment goals reviewed;risks/benefits reviewed;current/potential barriers reviewed;participants voiced agreement with care plan;participants included;patient   Clinical Impression Comments Patient presents with moderate oral and pharyngeal dysphagia on today's study characterized by mildly reduced bolus control during AP movement with premature entry, mildly reduced BOT retraction and epilgottic inversion/retraction. No penetration or aspiration occurred during the study, but is at risk due to premature spillage and  slow epiglottic movement. Timing/control of the swallow was best with thicker consistencies.   Recommend: 1. IDDSI level 4 puree and moderately thick liquids. 2. Assistance with feeding, upright, liquids by spoon to start with, slow pace, effortful swallows, alternate liquids/solids. 3. SLP will follow for diet tolerance, advancement and  execises.   SLP Total Evaluation Time   Evaluation, videofluoroscopic eval of swallow function Minutes (65948) 18   Interventions   Interventions Quick Adds Swallowing Dysfunction   Swallowing Intervention   Treatment of Swallowing Dysfunction &/or Oral Function for Feeding Minutes (34866) 10   Symptoms Noted During/After Treatment None   Treatment Detail/Skilled Intervention Patient was seen for swallow treatment after the study to provide education on the anatomy and physiology of the swallow and swallow strategies with a modified diet.   SLP Discharge Planning   SLP Plan Meal f/u puree and moderately thick by spoon/cup trial mild if doing ok.   SLP Discharge Recommendation Transitional Care Facility   SLP Rationale for DC Rec Swallow function is below baseline.   SLP Brief overview of current status  Moderate dysphagia per video puree and moderately thick by spoon wih assistance   SLP Time and Intention   Total Session Time (sum of timed and untimed services) 28

## 2024-10-30 NOTE — PLAN OF CARE
Physical Therapy: Orders received. Chart reviewed and discussed with care team.? Physical Therapy not indicated due to patient being aleks lift dependent with all mobility at baseline at long term care facility. No acute physical therapy needs identified at this time.? Defer discharge recommendations to occupational therapy and care team.? Will complete orders.

## 2024-10-30 NOTE — PROGRESS NOTES
"   10/30/24 1003   Appointment Info   Signing Clinician's Name / Credentials (OT) Trinity Granados, OTD   Living Environment   People in Home facility resident   Current Living Arrangements extended care facility   Home Accessibility no concerns   Transportation Anticipated health plan transportation   Self-Care   Usual Activity Tolerance poor   Current Activity Tolerance poor   Regular Exercise No   Equipment Currently Used at Home lift device;wheelchair, power   Fall history within last six months no   General Information   Onset of Illness/Injury or Date of Surgery 10/29/24   Referring Physician Tavares Magallon PA-C   Additional Occupational Profile Info/Pertinent History of Current Problem Suspected CVA, MRI negative for stroke. 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   Existing Precautions/Restrictions fall;oxygen therapy device and L/min  (O2 at baseline)   Cognitive Status Examination   Orientation Status orientation to person, place and time   Follows Commands follows one-step commands;over 90% accuracy   Cognitive Status Comments Good historian   Visual Perception   Impact of Vision Impairment on Function (Vision) Pt reports some impairements at baseline in terms of driving electric scooter, pt reports \"get's distracted easily and bumps into things\". Intact tracking, acuity and saccades.   Pain Assessment   Patient Currently in Pain No   Posture   Posture protracted shoulders;forward head position   Range of Motion Comprehensive   General Range of Motion upper extremity range of motion deficits identified   Comment, General Range of Motion L UE only shld shrug, baseline. R UE inconsistent movement. Pt reports previous shld sx on rotator cuff that \"messed up that shoulder\".   General Upper Extremity Assessment (Range of Motion)   Comment: Upper Extremity ROM Full finger flex/ext, able to flex/ext at elbow but with shld compensation. Hikes shld to elevate arm, able to raise over head with increased " "effort and attention to task.   Strength Comprehensive (MMT)   Comment, General Manual Muscle Testing (MMT) Assessment L UE 0/5, R UE hand 5/5, elbow 4/5, shld 3/5.   Coordination   Upper Extremity Coordination Left UE impaired   Bed Mobility   Comment (Bed Mobility) NT, pt reports he does not complete at home. \"they do it for me\"   Transfers   Transfer Comments NT, aleks dependent at baseline.   Activities of Daily Living   BADL Assessment/Intervention lower body dressing;upper body dressing;bathing;feeding;grooming;toileting   Bathing Assessment/Intervention   Wal[place] Level (Bathing) dependent (less than 25% patient effort)   Upper Body Dressing Assessment/Training   [place] Level (Upper Body Dressing) dependent (less than 25% patient effort)   Lower Body Dressing Assessment/Training   [place] Level (Lower Body Dressing) dependent (less than 25% patient effort)   Grooming Assessment/Training   [place] Level (Grooming) moderate assist (50% patient effort)   Comment, (Grooming) Limited R UE arm movement to brush hair   Eating/Self Feeding   [place] Level (Feeding) minimum assist (75% patient effort)   Comment, (Feeding) Limited R UE arm movement to feed self   Toileting   Comment, (Toileting) Baseline catheter and brief changes for BM   [place] Level (Toileting) dependent (less than 25% patient effort)   Clinical Impression   Criteria for Skilled Therapeutic Interventions Met (OT) Yes, treatment indicated   OT Diagnosis impaired R UE function   OT Problem List-Impairments impacting ADL problems related to;range of motion (ROM);strength;coordination   Assessment of Occupational Performance 1-3 Performance Deficits   Identified Performance Deficits self-feeding and grooming   Planned Therapy Interventions (OT) ADL retraining;fine motor coordination training;neuromuscular re-education;strengthening;ROM   Clinical Decision Making Complexity (OT) problem focused assessment/low complexity "   Risk & Benefits of therapy have been explained evaluation/treatment results reviewed;care plan/treatment goals reviewed   OT Total Evaluation Time   OT Eval, Low Complexity Minutes (78995) 10   OT Goals   Therapy Frequency (OT) Daily   OT Predicted Duration/Target Date for Goal Attainment 11/04/24   OT Goals Hygiene/Grooming;OT Goal 1;OT Goal 2   OT: Hygiene/Grooming supervision/stand-by assist   OT: Goal 1 Patient will demo self-feeding using R UE with set-up   OT: Goal 2 Pt will complete HEP for R UE indep.   Interventions   Interventions Quick Adds Neuromuscular Re-education;Therapeutic Procedures/Exercise   Therapeutic Procedures/Exercise   Therapeutic Procedure: strength, endurance, ROM, flexibillity minutes (50767) 11   Symptoms Noted During/After Treatment fatigue   Treatment Detail/Skilled Intervention Pt participates in exercises for R UE to increase functional use during ADLs. Pt given foam block for strengthening, demos understanding. AAROM for elbow flex/ext, chest press and overhead raise. Resistance added and tolerates well. Only able to complete 1 round of 10 reps before needing a break.   OT Discharge Planning   OT Plan R UE exercises - given foam block, hand to mouth and overhead raise for exercises   OT Discharge Recommendation (DC Rec) Long term care facility;home with assist   OT Rationale for DC Rec Patient is dependent for transfers and ADLs at baseline. Pt reports he spends most of the day in bed. Total A for toileting and dressing. Pt does not sit at EOB at baseline, reports he would tip over to the L. Occasionally completes own grooming but typically feeds self with R UE. R UE slightly weaker than baseline but anticipate return to long term care facility appropriate.   OT Brief overview of current status Goals of therapy will be to address safe mobility and make recs for d/c to next level of care. Pt and RN will continue to follow all falls risk precautions as documented by RN staff while  hospitalized  (does not get out of bed, able to raise R UE with increased time and cues.)   Total Session Time   Timed Code Treatment Minutes 11   Total Session Time (sum of timed and untimed services) 21

## 2024-10-30 NOTE — SIGNIFICANT EVENT
Significant Event Note    Time of event: 5:43 AM October 30, 2024    Description of event:  Paged for new ataxia of the right upper extremity and worsening RUE weakness.    Plan:  Recommended RRT    Discussed with: bedside nurse    Ok Davey MD

## 2024-10-30 NOTE — PHARMACY-CONSULT NOTE
Pharmacy Consult to evaluate for medication related stroke core measures    Ron Gilmore, 84 year old male admitted for evaluation of right arm weakness that was present on waking on 10/29/2024.    Thrombolytic was not given because of Time from onset contraindications    VTE Prophylaxis SCDs /PCDs placed on ,ordered on 10/29/2024, as appropriate prior to end of hospital day 2.    Antithrombotic: aspirin and clopidogrel started on 10/29/2024, as appropriate by end of hospital day 2. Continue antithrombotic therapy on discharge to meet quality measures, unless contraindicated.    Anticoagulation if history of A-fib/flutter: Patient does not have history of A-fib/flutter - anticoagulation not required for medication related stroke core measures.     LDL Cholesterol Calculated   Date Value Ref Range Status   01/16/2020 40 <100 mg/dL Final     Comment:     Desirable:       <100 mg/dl       Patient's home statin, Lipitor (atorvastatin) restarted; continue statin on discharge to meet quality measures, unless contraindicated.     Recommendations: None at this time    Thank you for the consult.    Vanessa Miller RPH 10/30/2024 9:33 AM

## 2024-10-30 NOTE — CONSULTS
"[facility] Nurse Inpatient Assessment     Consulted for: \"Pre-existing bilateral buttocks pressure wounds\"     Summary: Chronic tissue damage and friction to bilateral buttocks, POA.    [facility] nurse will sign off.     Patient History (according to provider note(s):      \"Ron Gilmore is a 84 year old male with past medical history significant for prior R internal capsule stroke (2020), HTN, HLD, COPD, DM2, urinary retention. He presented to the ED for evaluation of right arm weakness that was present on waking. He has left sided weakness at baseline from a prior stroke. He requires Terrie lift for transfers.\"    Assessment:      Areas visualized during today's visit: Focused: and Sacrum/coccyx    Skin Injury Location: Bilateral buttocks        Last photo: 10/30  Skin injury due to: Chronic skin injury (often related to prolonged recliner use) and Friction  Skin history and plan of care: Per RN and patient, Shannon prefers wearing a brief. Staff at his care facility administer a suppository 3x weekly and he has an incontinent BM. Patient requires lift for transfers.   Affected area:      Skin assessment: Erythema and Superficial scabbing     Measurements (length x width x depth, in cm) scab L buttock ~ 2  x 1  x  0.1 cm      Color: dusky     Temperature  normal      Drainage: none .      Color: none      Odor: none  Pain: no grimacing or signs of discomfort, none  Pain interventions prior to dressing change: slow and gentle cares   Treatment goal: Decrease moisture and Protection  STATUS: initial assessment  Supplies ordered: gathered, at bedside, supplies stored on unit, discussed with RN, and discussed with patient       Treatment Plan:     Bilateral buttock wound(s): BID and PRN with episodes of incontinence  Cleanse skin per incontinence protocol (available on intranet). Avoid moist Bath wipes. Use Koki spray and white cloths.   Apply a thin layer of moisture barrier paste (Critic-aid) to " "affected area. Not necessary to remove all paste with each cleansing. Wipe away soiled paste and reapply PRN.  Follow PIP Plan.      Pressure Injury Prevention (PIP) Plan:  -If patient is declining pressure injury prevention interventions: Explore reason why and address patient's concerns, Educate on pressure injury risk and prevention intervention(s), If patient is still declining, document \"informed refusal\" , and Ensure Care team is aware ( provider, charge nurse, etc)  -Mattress: Add NOÉ pump to mattress PRN moisture issues  -HOB: Maintain at or below 30 degrees, unless contraindicated  -Repositioning in bed: Every 1-2 hours , Left/right positioning; avoid supine, and Raise foot of bed prior to raising head of bed, to reduce patient sliding down (shear)  -Heels: Keep elevated off mattress  -Protective Dressing: None  -Positioning Equipment:  pillows  -Chair positioning: Assist patient to reposition hourly and Do NOT use a donut for sitting (this increases pressure to smaller area and creates a higher potential for injury)    -Moisture Management: Perineal cleansing /protection: Follow Incontinence Protocol, Avoid brief in bed, Clean and dry skin folds with bathing , and Moisturize dry skin  -Under Devices: Inspect skin under all medical devices during skin inspection , Ensure tubes are stabilized without tension, and Ensure patient is not lying on medical devices or equipment when repositioned     Orders: Written    RECOMMEND PRIMARY TEAM ORDER: None, at this time  Education provided: importance of repositioning, plan of care, Moisture management, Hygiene, and Off-loading pressure  Discussed plan of care with: Patient and Nurse  WOC nurse follow-up plan: signing off  Notify WOC if wound(s) deteriorate.  Nursing to notify the Provider(s) and re-consult the WOC Nurse if new skin concern.    DATA:     Current support surface: Standard  Standard gel mattress (Isoflex)  Containment of urine/stool: Incontinence " "Protocol and Incontinent pad in bed  BMI: Body mass index is 30.56 kg/m .   Active diet order: Orders Placed This Encounter      Combination Diet Pureed Diet (level 4); Liquidized/Moderately Thick (level 3) (Upright liquids by spoon or small sip from the cup, slow pace hard swallows and alternate liquids/solids.)     Output: I/O last 3 completed shifts:  In: -   Out: 1375 [Urine:1375]     Labs:   Recent Labs   Lab 10/29/24  0954   HGB 10.7*   INR 1.10   WBC 8.5     Pressure injury risk assessment:   Sensory Perception: 4-->no impairment  Moisture: 4-->rarely moist  Activity: 2-->chairfast  Mobility: 2-->very limited  Nutrition: 3-->adequate  Friction and Shear: 1-->problem  Lon Score: 16    Jud Tinsley RN, CWOCN  Please contact via [employer] at group \"[facility] nurse\"- M-F [phone]-4P    "

## 2024-10-30 NOTE — PLAN OF CARE
Goal Outcome Evaluation:      Plan of Care Reviewed With: patient    Overall Patient Progress: improvingOverall Patient Progress: improving     Reason for Admission: CVA work up (neg)    Cognitive/Mentation: A/Ox 4  Neuros/CMS: L hemiplegia, r foot droop no plantar flection.   VS: stable.   Tele: NSR.  /GI: Continent. Last BM 10/30 chirinos in place for retention.   Pulmonary: LS dimnished. 2 L NC inhalers and nebs per orders.  Pain: denies.     Drains/Lines: piv patent intact and SL  Skin: old wound to coccyx barrier cream in place open to air  Activity: Assist x two with lift.  Diet: pureed with mod thick liquids. Prefers to take pills whole. Educated on safety of medication administration and consumption.    Therapies recs: LTC  Discharge: pending    Aggression Stoplight Tool: green

## 2024-10-30 NOTE — PROGRESS NOTES
[facility]    Vascular Neurology Progress Note    Interval History     RRT called this morning for worsening right-sided weakness that was present when he awoke.  Symptoms rapidly improved and he was able to move the arm at his baseline.  MRI brain was negative for acute stroke.    He reports that he had surgery on his right shoulder 20 years ago.  Since then, he has had stiffness in the shoulder and some pain.  This morning on evaluation, he initially had difficulty elevating the right arm but when assisted antigravity strength was 5 out of 5.  On repeat examination, he had no difficulty elevating the arm.    Hospital Course     Chief complaint: One-sided Weakness    84 year old male with past medical history significant for prior R internal capsule stroke (2020), HTN, HLD, COPD, DM2, urinary retention. He presented to the ED for evaluation of right arm weakness that was present on waking. He has left sided weakness at baseline from a prior stroke. He requires Terrie lift for transfers.     Assessment and Plan     1.  Fluctuating right arm weakness with element of giveaway weakness.  MRI brain negative for acute stroke.  Symptoms not consistent with TIA.  2.  Multiple chronic lacunar infarcts and small regions of hemosiderin deposition.    -Discontinue Plavix, continue PTA aspirin 81 mg daily indefinitely  -LDL pending, continue PTA statin with titration to goal LDL 40-70  -Goal A1c <7.0  -Goal BP <130/80 with tighter control associated with decreased overall CV risk, if tolerated  -Therapies  -No further stroke evaluation indicated at this time, no need for TTE    DVT Prophylaxis: SCDs    Patient Follow-up    - in the next 1-2 week(s) with PCP    No further stroke evaluation is recommended, so we will sign off. Please contact us with any additional questions.      Evelyn Orozco, CNP  Vascular Neurology    To page me or covering stroke neurology team member, click here:  "[facility]  Choose \"On Call\" tab at top, then select \"NEUROLOGY/ALL SITES\" from middle drop-down box, press Enter, then look for \"stroke\" or \"telestroke\" for your site.    Physical Examination     Temp: 97.7  F (36.5  C) Temp src: Oral BP: 137/70 Pulse: 88   Resp: 17 SpO2: 97 % O2 Device: Nasal cannula Oxygen Delivery: 2 LPM    Neurologic  Mental Status:  alert, oriented x 3, follows commands, speech clear and fluent, naming and repetition normal  Cranial Nerves:  visual fields intact, EOMI with normal smooth pursuit, hearing not formally tested but intact to conversation, tongue protrusion midline, mild left facial weakness  Motor:  normal muscle tone and bulk, no abnormal movements, right upper extremity with decreased range of motion in shoulder but no pronator drift, elbow flexion/extension 5/5, chronic right foot drop, no drift in right leg, left arm and leg not antigravity, able to move left fingers and wiggle left toes  Reflexes:   Deferred  Sensory:  light touch sensation intact and symmetric throughout upper and lower extremities, no extinction on double simultaneous stimulation   Coordination:   No ataxia out of proportion to weakness  Station/Gait:  deferred    Imaging/Labs   (Bolded imaging and labs new and/or personally reviewed or re-reviewed by me today)    MRI/Head CT MRI brain negative for acute stroke, extensive chronic lacunar infarcts.  Some small regions of hemosiderin deposition noted.   Intracranial Vasculature Unremarkable   Cervical Vasculature Unremarkable       Time Spent on this Encounter   Billing: I have personally spent a total of 25 minutes providing care today, time spent in reviewing medical records and devising the plan as recorded above.    "

## 2024-10-30 NOTE — PLAN OF CARE
Goal Outcome Evaluation:          Reason for Admission: R arm weakness - r/o CVA.     Cognitive/Mentation: A/Ox 4  Neuros/CMS: Intact ex baseline L hemiplegia (able to wiggle toes of L foot), RUE 4/5, and dysarthria.  VS: WDL on 2 L O2 (uses O2 at home).   Tele: SR with a BBB.  /GI: Pre-existing chirinos for retention - adequate output. Continent. Received scheduled suppository and later had soft BM.  Pulmonary: LS Diminished.  Pain: denies.      Drains/Lines: PIV saline locked.  Skin: Purple, blanchable area on coccyx with scabbing on R buttock.  WOC consulted. Repositioned q 2 hrs and barrier cream applied. Pt wears briefs  - educated by WOC RN to consider having brief off in bed.  Activity: Assist x 2 with lift. Pt refused to get into recliner chair stating it hurts his back. Pt stated he has a better chair at his long term care facility and will be in the chair when he returns home.  Diet: Pureed diet with mod thick liquids reordered after video swallow eval. Pt coughed while being fed by NA - SLP saw pt at the bedside and provided additional strategies for eating.     Therapies recs: Return to long-term care facility.  Discharge: 10/31     Aggression Stoplight Tool: green     End of shift summary: Neuro exam unchanged. R arm back to baseline per pt.

## 2024-10-31 ENCOUNTER — APPOINTMENT (OUTPATIENT)
Dept: OCCUPATIONAL THERAPY | Facility: CLINIC | Age: 84
DRG: 556 | End: 2024-10-31
Payer: MEDICARE

## 2024-10-31 VITALS
RESPIRATION RATE: 16 BRPM | TEMPERATURE: 98.2 F | BODY MASS INDEX: 30.56 KG/M2 | DIASTOLIC BLOOD PRESSURE: 62 MMHG | WEIGHT: 225.31 LBS | OXYGEN SATURATION: 87 % | HEART RATE: 67 BPM | SYSTOLIC BLOOD PRESSURE: 125 MMHG

## 2024-10-31 PROCEDURE — 97530 THERAPEUTIC ACTIVITIES: CPT | Mod: GO

## 2024-10-31 PROCEDURE — 94640 AIRWAY INHALATION TREATMENT: CPT

## 2024-10-31 PROCEDURE — 250N000013 HC RX MED GY IP 250 OP 250 PS 637: Performed by: INTERNAL MEDICINE

## 2024-10-31 PROCEDURE — 999N000157 HC STATISTIC RCP TIME EA 10 MIN

## 2024-10-31 PROCEDURE — 97110 THERAPEUTIC EXERCISES: CPT | Mod: GO

## 2024-10-31 PROCEDURE — 99239 HOSP IP/OBS DSCHRG MGMT >30: CPT | Performed by: INTERNAL MEDICINE

## 2024-10-31 PROCEDURE — 250N000009 HC RX 250: Performed by: PHYSICIAN ASSISTANT

## 2024-10-31 RX ADMIN — ATORVASTATIN CALCIUM 10 MG: 10 TABLET, FILM COATED ORAL at 09:00

## 2024-10-31 RX ADMIN — SENNOSIDES AND DOCUSATE SODIUM 2 TABLET: 50; 8.6 TABLET ORAL at 08:57

## 2024-10-31 RX ADMIN — FORMOTEROL FUMARATE DIHYDRATE 20 MCG: 20 SOLUTION RESPIRATORY (INHALATION) at 07:42

## 2024-10-31 RX ADMIN — PANTOPRAZOLE SODIUM 40 MG: 40 INJECTION, POWDER, FOR SOLUTION INTRAVENOUS at 09:00

## 2024-10-31 RX ADMIN — METHENAMINE HIPPURATE 1 G: 1 TABLET ORAL at 08:57

## 2024-10-31 RX ADMIN — DEXTRAN 70, GLYCERIN, HYPROMELLOSE 2 DROP: 1; 2; 3 SOLUTION/ DROPS OPHTHALMIC at 09:00

## 2024-10-31 RX ADMIN — IPRATROPIUM BROMIDE AND ALBUTEROL 1 PUFF: 20; 100 SPRAY, METERED RESPIRATORY (INHALATION) at 09:00

## 2024-10-31 RX ADMIN — METOPROLOL TARTRATE 12.5 MG: 25 TABLET, FILM COATED ORAL at 08:57

## 2024-10-31 RX ADMIN — ALLOPURINOL 300 MG: 300 TABLET ORAL at 08:57

## 2024-10-31 RX ADMIN — IPRATROPIUM BROMIDE AND ALBUTEROL 1 PUFF: 20; 100 SPRAY, METERED RESPIRATORY (INHALATION) at 12:16

## 2024-10-31 RX ADMIN — ASPIRIN 81 MG CHEWABLE TABLET 81 MG: 81 TABLET CHEWABLE at 09:00

## 2024-10-31 RX ADMIN — SERTRALINE HYDROCHLORIDE 75 MG: 50 TABLET ORAL at 09:00

## 2024-10-31 ASSESSMENT — ACTIVITIES OF DAILY LIVING (ADL)
ADLS_ACUITY_SCORE: 0

## 2024-10-31 NOTE — PROGRESS NOTES
Care Management Discharge Note    Discharge Date: 10/31/2024       Discharge Disposition:  [facility]   Discharge Services:  transport  Discharge Transportation: [facility]     Private pay costs discussed: Not applicable    Does the patient's insurance plan have a 3 day qualifying hospital stay waiver?  No    Handoff Referral Completed: No, handoff not indicated or clinically appropriate    Additional Information:  Patient is medically ready to discharge today back to his assisted living. Orders faxed to facility. [facility] will pick patient up between [id number] to go to [facility]. JEROME called Gardenia at the facility and confirmed the plan and she has received the discharge orders. There are no new medications to be filled. Left voicemail with stepdaughter López letting her know the plan.     ARMEN Lake, LGSW   Social Work   [facility]

## 2024-10-31 NOTE — PLAN OF CARE
Speech Language Therapy Discharge Summary    Reason for therapy discharge:    Discharged to home with home therapy.    Progress towards therapy goal(s). See goals on Care Plan in [facility] electronic health record for goal details.  Goals not met.  Barriers to achieving goals:   discharge from facility.    Therapy recommendation(s):    Continued therapy is recommended.  Rationale/Recommendations:  Continue short term SLP needs for diet tolerance of puree and moderately thick liquids. Advance diet as tolerated. Patient completed a video swallow study as an IP. Patient presents with moderate oral and pharyngeal dysphagia on today's study characterized by mildly reduced bolus control during AP movement with premature entry, mildly reduced BOT retraction and epilgottic inversion/retraction. No penetration or aspiration occurred during the study, but is at risk due to premature spillage and slow epiglottic movement. Timing/control of the swallow was best with thicker consistencies. Recommend: 1. IDDSI level 4 puree and moderately thick liquids. 2. Assistance with feeding, upright, liquids by spoon to start with, slow pace, effortful swallows, alternate liquids/solids. 3. SLP will follow for diet tolerance, advancement and execises.

## 2024-10-31 NOTE — PLAN OF CARE
Occupational Therapy Discharge Summary    Reason for therapy discharge:    Discharged to home with home therapy.    Progress towards therapy goal(s). See goals on Care Plan in [facility] electronic health record for goal details.  Goals partially met.  Barriers to achieving goals:   discharge from facility.    Therapy recommendation(s):    Continued therapy is recommended.  Rationale/Recommendations:  Patient is dependent for transfers and ADLs at baseline. Pt reports he spends most of the day in bed. Total A for toileting and dressing. Pt does not sit at EOB at baseline, reports he would tip over to the L. Occasionally completes own grooming but typically feeds self with R UE. R UE slightly weaker than baseline but anticipate return to long term care facility appropriate..

## 2024-10-31 NOTE — PLAN OF CARE
Lisa Rubio RN on 10/31/2024 at 1:50 AM    Reason for Admission: R sided weakness.      Cognitive/Mentation: A/O x4.   Neuros/CMS: Baseline L hemiplegia (able to do wiggle toes on L foot), LLE/LUE 0-1/5, RLE 4/5, RUE 3-4/5, dysarthria, spastic LUE, R foot drop.  VS: Stable on 2 L NC @ baseline.   Tele: SR with BBB.   GI: Continent. LBM 10/30.   : Chronic chirinos for retention.   Pulmonary: LS diminished.   Pain: denies.      Drains/Lines: PIV infusing SL.   Skin: PTA wound on buttock- barrier cream in place and LUIS FERNANDO. Q2 hr turn/repo.   Activity: Assist x2 with lift. Q 2hr turn/repo.   Diet: Pureed with mod thick liquids.      Therapies recs: LTC.   Discharge: pending, possibly today.      Aggression Spotlight Tool: green.     End of shift summary: No acute changes overnight.

## 2024-10-31 NOTE — DISCHARGE SUMMARY
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  Oropharyngeal dysphagia  Hypertension  Hyperlipidemia  Chronic LE edema  DM II, well managed, with peripheral neuropathy  Chronic hypoxic and hypercapnic respiratory failure, on 2L NC at baseline  COPD  BRAD, unable to tolerate CPAP  Recent bibasilar pneumonia  Urinary retention with chronic chirinos catheter  Abnl UC, suspect dt colonization  BPH  Constipation  Obesity   Depression, anxiety  Gout    History of Present Illness   oRn Gilmore is a 84 year old male with PMHx of prior CVA with residual left sided deficits, dysphagia, chronic hypoxic/hypercapnic respiratory failure, urinary retention with chronic chirinos catheter, hypertension, hyperlipidemia, DM II, BPH, peripheral neuropathy, COPD, BRAD, depression, anxiety, gout and chronic LE edema among other medical problems who was admitted on 10/29/2024 for evaluation of right sided weakness.     Hospital Course   Ron Gilmore was admitted on 10/29/2024.  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). Brought to ED on the day of admission for evaluation of right arm weakness. Unclear when the weakness developed, but had been in his usual state of health the night prior. No other new neurologic changes noted.   *In the ED, was afebrile and hemodynamically stable. Labs were generally unremarkable. Trop neg. EKG showed NSR with a RBBB. CXR neg. Initial head CT was neg for acute changes, showed findings of remote infarcts. CTA head/neck was negative for any large  vessel occlusions. CT perfusion scan was limited dt motion degradation. Stroke team was contacted, given Plavix load and full dose ASA, plan was to start DAPT on 10/30.   *MRI brain showed extensive chronic changes, no acute ischemia; some small regions of hemosiderin deposition in the brain parenchyma which could be dt ischemic changes vs cerebral amyloid angiopathy.  *RRT called on 10/30 AM for fluctuating RUE neurologic changes. No additional imaging pursued.   *Per stroke team, findings of fluctuating right arm weakness with element of giveaway weakness are not consistent with TIA, MRI brain neg  *Remainder of stroke workup unremarkable -- recent A1c 6.5 in 8/2024, serial trops elevated but flat. No additional stroke workup needed this stay, no need for repeat echo. Goal SBP <130/80.  *Discharged back to [facility] w/assist as per prior to admission.  *OP follow up with PCP after discharge    Oropharyngeal dysphagia  *On a modified diet at baseline.   *After initial SLP assessment on the day of admission, was kept NPO dt concerns for aspiration. As such, was placed on IVFs.   *Reassessed per SLP on 10/30 with VSS, okay'd for pureed diet and moderately thick liquids.     Hypertension  Hyperlipidemia  Chronic LE edema  *Chronic and stable on statin, metoprolol and Lasix.     DM II, well managed, with peripheral neuropathy  *A1c 6.5 in 8/2024. Not on specific treatment. No need to monitor BG or use sliding scale insulin this stay.     Chronic hypoxic and hypercapnic respiratory failure, on 2L NC at baseline  COPD  BRAD, unable to tolerate CPAP  Recent bibasilar pneumonia  *Recent hospitalization 10/11-10/15 due to bibasilar pneumonia; has completed course of antibiotics.    *Chronic and stable on home inhalers, baseline O2 needs    Urinary retention with chronic chirinos catheter  Abnl UC, suspect dt colonization  BPH  *Chronic and stable, cont Hiprex. Routine chirinos cares.   *Abnl UA this stay with mod leuk est, +nitrites and  31 WBCs. UC grew 2 strains of 50-100k pseudomonas aeruginosa. Has grown this organism on prior UCs; given lack of systemic symptoms of infection (fever, leukocytosis), favor findings represent colonization, no indication to treat at this time    Constipation  *Cont sched/prn bowel regimen.    Obesity   *Body mass index is 30.5 kg/m .  Follow up with PCP regarding ongoing management.      Depression, anxiety  *Chronic and stable on sertraline.    Gout  *Chronic and stable on allopurinol.     Mali Villasenor,     Pending Results   These results will be followed up by PCP  Unresulted Labs Ordered in the Past 30 Days of this Admission       Date and Time Order Name Status Description    10/29/2024  3:30 PM Urine Culture Preliminary             Code Status   DNR / DNI       Primary Care Physician   [facility]    Physical Exam   Temp: 98.9  F (37.2  C) Temp src: Oral BP: 124/64 Pulse: 72   Resp: 17 SpO2: 96 % O2 Device: Nasal cannula Oxygen Delivery: 2 LPM  Vitals:    10/29/24 0952 10/29/24 1338   Weight: 102 kg (224 lb 13.9 oz) 102.2 kg (225 lb 5 oz)     Vital Signs with Ranges  Temp:  [97.5  F (36.4  C)-98.9  F (37.2  C)] 98.9  F (37.2  C)  Pulse:  [66-90] 72  Resp:  [17-19] 17  BP: (124-137)/(60-72) 124/64  SpO2:  [94 %-98 %] 96 %  I/O last 3 completed shifts:  In: 1270 [P.O.:270; I.V.:1000]  Out: 575 [Urine:575]    General: Resting comfortably, alert, conversive, NAD  Respiratory: CTAB, no wheeze, no increased work of breathing  Cardiovascular: HRRR, no MGR, no LE edema  GI: S, NT, ND, +BS  Skin/Integumen: warm/dry  Neuro: CNs 2-12 intact, +L sided weakness  Other:  Cardona catheter draining dark yellow urine    Discharge Disposition   Discharged to assisted living  Condition at discharge: Stable    Consultations This Hospital Stay   NEUROLOGY IP STROKE CONSULT  -----------------  SPEECH LANGUAGE PATH ADULT IP CONSULT  PHYSICAL THERAPY ADULT IP CONSULT  OCCUPATIONAL THERAPY ADULT IP  CONSULT  PHARMACY IP CONSULT  REHAB ADMISSIONS LIAISON IP CONSULT  CARE MANAGEMENT / SOCIAL WORK IP CONSULT  WOUND OSTOMY CONTINENCE NURSE  IP CONSULT  SMOKING CESSATION PROGRAM IP CONSULT    Time Spent on this Encounter   I, Mali Villasenor DO, personally saw the patient today and spent greater than 30 minutes discharging this patient.      Discharge Orders      Medication Therapy Management Referral      Reason for your hospital stay    Evaluation of your right-sided weakness, which was concerning for possible stroke.  On imaging this day, you had no evidence to suggest findings of a new stroke.     Activity    Your activity upon discharge: Up with assist     Follow-up and recommended labs and tests     1.  Follow-up with your PCP or facility physician in the next 1 to 2 weeks.  No specific labs needed     Diet    Follow this diet upon discharge: Pureed Diet (level 4); Liquidized/Moderately Thick (level 3) (Upright liquids by spoon or small sip from the cup, slow pace hard swallows and alternate liquids/solids.)       Discharge Medications   Current Discharge Medication List        CONTINUE these medications which have NOT CHANGED    Details   acetaminophen (TYLENOL) 325 MG tablet Take 650 mg by mouth every 4 hours as needed for mild pain as needed for pain/fever Max dose 3000mg/24hr      allopurinol (ZYLOPRIM) 300 MG tablet Take 300 mg by mouth every morning 0900      aspirin (ASA) 81 MG EC tablet Take 1 tablet (81 mg) by mouth daily  Qty:      Associated Diagnoses: Acute and chronic respiratory failure with hypoxia (H)      atorvastatin (LIPITOR) 10 MG tablet Take 10 mg by mouth every morning 0900      bisacodyl (DULCOLAX) 10 MG suppository Place 10 mg rectally three times a week. Mon/Wed/Fri      formoterol (PERFOROMIST) 20 MCG/2ML neb solution Take 20 mcg by nebulization every 12 hours 1000, 2300      furosemide (LASIX) 20 MG tablet Take 20 mg by mouth daily 0900  Qty: 45 tablet, Refills: 0     Associated Diagnoses: Acute diastolic congestive heart failure (H)      !! hypromellose (ARTIFICIAL TEARS) 0.5 % SOLN ophthalmic solution Place 2 drops into both eyes 2 times daily 0800, 2000      !! hypromellose (ARTIFICIAL TEARS) 0.5 % SOLN ophthalmic solution Place 2 drops into both eyes 2 times daily as needed for dry eyes      ipratropium - albuterol 0.5 mg/2.5 mg/3 mL (DUONEB) 0.5-2.5 (3) MG/3ML neb solution Take 1 vial by nebulization 3 times daily as needed for shortness of breath, wheezing or cough.      ipratropium-albuterol (COMBIVENT RESPIMAT)  MCG/ACT inhaler Inhale 1 puff into the lungs 4 times daily 0900, 1200 ,1600 ,2000      loperamide (IMODIUM) 2 MG capsule Take 2 mg by mouth every 6 hours as needed for diarrhea      melatonin 3 MG tablet Take 3 mg by mouth at bedtime.      methenamine hippurate (HIPREX) 1 g tablet Take 1 g by mouth 2 times daily 0900, 2000      metoprolol tartrate (LOPRESSOR) 25 MG tablet Take 0.5 tablets (12.5 mg) by mouth 2 times daily    Associated Diagnoses: Cerebrovascular accident (CVA), unspecified mechanism (H)      pantoprazole (PROTONIX) 40 MG EC tablet Take 40 mg by mouth 2 times daily 0900, 2000      !! senna-docusate (SENOKOT-S/PERICOLACE) 8.6-50 MG tablet Take 2 tablets by mouth 2 times daily. 0900, 2000      !! senna-docusate (SENOKOT-S/PERICOLACE) 8.6-50 MG tablet Take 1 tablet by mouth daily as needed for constipation      sertraline (ZOLOFT) 50 MG tablet Take 75 mg by mouth daily. 0900      simethicone (MYLICON) 125 MG chewable tablet Take 125 mg by mouth 3 times daily as needed for intestinal gas      Vitamin D3 (VITAMIN D, CHOLECALCIFEROL,) 25 mcg (1000 units) tablet Take 1 tablet by mouth daily 0800      ammonium lactate (LAC-HYDRIN) 12 % external lotion Apply topically daily Apply a thin film to bilateral feet and legs       !! - Potential duplicate medications found. Please discuss with provider.        Allergies   No Known Allergies    Data   Results  for orders placed or performed during the hospital encounter of 10/29/24   CT Head w/o Contrast    Narrative    CT SCAN OF THE HEAD WITHOUT CONTRAST   10/29/2024 10:06 AM     HISTORY: Code Stroke to evaluate for potential thrombolysis and  thrombectomy. PLEASE READ IMMEDIATELY.    TECHNIQUE:  Axial images of the head and coronal reformations without  IV contrast material. Radiation dose for this scan was reduced using  automated exposure control, adjustment of the mA and/or kV according  to patient size, or iterative reconstruction technique.    COMPARISON: 9/20/2023    FINDINGS: No midline shift or mass effect. No acute intracranial  hemorrhage. No hydrocephalus or extra-axial hemorrhage. Multifocal  remote lacunar infarcts in the bilateral basal ganglia. Intracranial  atheromatous disease is present. No clear evidence of acute loss of  gray-white differentiation. Mild to moderate global cortical volume  loss with ex vacuo dilatation of the ventricular system. Chronic small  vessel ischemic changes in a periventricular distribution.      Impression    IMPRESSION:   No acute intracranial hemorrhage.      TAURUS HAGEN MD         SYSTEM ID:  [id number]   CTA Head Neck with Contrast    Narrative    CT ANGIOGRAM OF THE HEAD AND NECK WITH CONTRAST  10/29/2024 10:07 AM     HISTORY: Code Stroke to evaluate for potential thrombolysis and  thrombectomy. PLEASE READ IMMEDIATELY.    TECHNIQUE:  CT angiography with an injection of 67mL Isovue-370 IV  with scans through the head and neck. Images were transferred to a  separate 3-D workstation where multiplanar reformations and 3-D images  were created. Estimates of carotid stenoses are made relative to the  distal internal carotid artery diameters except as noted. Radiation  dose for this scan was reduced using automated exposure control,  adjustment of the mA and/or kV according to patient size, or iterative  reconstruction technique.    COMPARISON: CT brain from the same day.      CT HEAD FINDINGS: No contrast enhancing lesions. Cerebral blood flow  is grossly normal.     CT ANGIOGRAM HEAD FINDINGS: The anterior and middle cerebral arterial  distributions are without vascular occlusion. The A1 and M1 segments  are preserved without vascular occlusion. No evidence of high-grade  stenosis. There are findings of atheromatous disease involving the  petrous and cavernous segments of the internal carotid arteries    The vertebrobasilar system is unremarkable. The vertebral arteries  intracranially demonstrates some mild atheromatous changes without  high-grade stenosis.      CT ANGIOGRAM NECK FINDINGS:   2 vessel aortic arch. No high-grade stenosis in the neck. There are  some mild atheromatous changes at the origins of the internal carotid  arteries with no hemodynamically significant stenosis. The vertebral  arteries are unremarkable throughout their course in the neck     Other findings: Emphysematous changes in the lung apices..       Impression    IMPRESSION: No large vessel occlusion findings intracranially. No  high-grade stenosis in the neck.    Case discussed with Dr. Remy at 10:17 AM CST.        TAURUS HAGEN MD         SYSTEM ID:  [id number]   CT Head Perfusion w Contrast - For Tier 2 Stroke    Narrative    CT BRAIN PERFUSION 10/29/2024 10:19 AM    HISTORY: Code Stroke to evaluate for potential thrombolysis and  thrombectomy. Evaluate mismatch between penumbra and core infarct.  READ IMMEDIATELY.    TECHNIQUE: Time sequential axial CT images of the head were acquired  during the administration of 117ml isovue 370 IV. Color perfusion maps  of the brain were created from this time sequential axial source data.      Radiation dose for this scan was reduced using automated exposure  control, adjustment of the mA and/or kV according to patient size, or  iterative reconstruction technique.    COMPARISON: CT brain and CTA head and neck.    FINDINGS: Please note the study is significantly  degraded by motion  artifact which limits assessment. Areas identified on the rapid  software correspond to extracranial structures and do not reflect  tissue at risk most likely. A large region of the identified TMAX  abnormality corresponds to bifrontal extra-axial spaces and  extra-axial spaces at the cranial apex.      Impression    IMPRESSION: There is motion degradation of the examination. This  markedly limits assessment. While there is no clear evidence of  significant tissue at risk, the rapid software identifies areas that  do not correspond to brain tissue. Recommend that treatment decisions  based upon imaging should not be based on the CT perfusion but rather  the CT brain and CTA of the neck.    TAURUS HAGEN MD         SYSTEM ID:  [id number]   XR Chest Port 1 View    Narrative    CHEST ONE VIEW PORTABLE   10/29/2024 10:23 AM     HISTORY:  Cough.    COMPARISON: 8/20/2024.      Impression    IMPRESSION: No significant interval change. Shallow inspiration. Mild  bibasilar opacities may be related to atelectasis or infection. No  pneumothorax. Heart size is stable. Pulmonary vascularity is within  normal limits. Loop recorder device is projected over the left  hemithorax laterally.     BRUNO PAYAN MD         SYSTEM ID:  [id number]   MR Brain w/o & w Contrast    Narrative    MRI BRAIN WITHOUT AND WITH CONTRAST  10/29/2024 4:20 PM     HISTORY:  Right arm weakness     TECHNIQUE:  Multiplanar, multisequence MRI of the brain without and  with 10 mL Gadavist     COMPARISON: CT brain from earlier in the day.     FINDINGS: No restricted diffusion to suggest acute ischemia. Midline  developmental anatomy is grossly unremarkable. There is overall  moderate to severe global cortical volume loss with ex vacuo  dilatation of the ventricular system. Extensive remote ischemic  findings with remote lacunar infarcts in the bilateral basal ganglia  and thalamic distributions. Multiple remote lacunar infarcts in  the  midbrain and pontine medullary distribution. No acute intracranial  hemorrhage. No hydrocephalus or extra-axial hemorrhage. Susceptibility  imaging demonstrates a few foci of hemosiderin deposition in the  bilateral cerebellar hemispheres as well as in the basal ganglia and  thalamic distributions. Postcontrast imaging demonstrates no abnormal  intraparenchymal enhancement.      Impression    IMPRESSION: Extensive chronic changes as detailed above. No evidence  of abnormal enhancement. No evidence of restricted diffusion to  suggest acute ischemia..    Some small regions of hemosiderin deposition are noted within the  brain parenchyma which could be the result of remote ischemic change  with associated small amounts of hemosiderin versus cerebral amyloid  angiopathy.          TAURUS HAGEN MD         SYSTEM ID:  [id number]   XR Video Swallow with SLP or OT    Narrative    VIDEO SWALLOWING EVALUATION   10/30/2024 8:58 AM     HISTORY: new possible CVA and hx of dysphagia    COMPARISON: None.    FLUOROSCOPY TIME: 1.8 minutes.      Impression    IMPRESSION:    Thin: No penetration or aspiration.    Slightly thick: No penetration or aspiration.    Mildly thick: No penetration or aspiration.    Moderately thick: No penetration or aspiration.    Puree: No penetration or aspiration.    This study only includes the cervical esophagus. Please see speech  pathology note for further details.    LUÍS STRICKLAND DO         SYSTEM ID:  [id number]     Most Recent 3 CBC's:  Recent Labs   Lab Test 10/29/24  0954 10/14/24  0742 10/13/24  1026 10/12/24  0754   WBC 8.5  --  10.3 18.0*   HGB 10.7*  --  9.5* 10.0*   MCV 87  --  89 89    208 216 217     Most Recent 3 BMP's:  Recent Labs   Lab Test 10/30/24  1437 10/30/24  1111 10/30/24  0822 10/29/24  1701 10/29/24  0954 10/15/24  0818 10/14/24  0742 10/13/24  1026 10/12/24  0754   NA  --   --   --   --  138  --   --  137 140   POTASSIUM  --   --   --   --  4.4  --   --  3.8 4.3    CHLORIDE  --   --   --   --  97*  --   --  100 102   CO2  --   --   --   --  33*  --   --  28 29   BUN  --   --   --   --  19.3  --   --  29.8* 24.8*   CR  --   --   --   --  0.95  --  0.95 1.02 1.11   ANIONGAP  --   --   --   --  8  --   --  9 9   RAJ  --   --   --   --  9.2  --   --  8.7* 9.0   * 106* 110*   < > 133*   < >  --  169* 135*    < > = values in this interval not displayed.     7-Day Micro Results       Collected Updated Procedure Result Status      10/29/2024 1515 10/31/2024 0723 Urine Culture [[id number]]   (Abnormal)   Urine, Cardona Catheter    Preliminary result Component Value   Culture 50,000-100,000 CFU/mL Pseudomonas aeruginosa  [P]     50,000-100,000 CFU/mL Pseudomonas aeruginosa  [P]

## 2024-11-01 LAB
BACTERIA UR CULT: ABNORMAL
BACTERIA UR CULT: ABNORMAL

## 2024-11-05 ENCOUNTER — DOCUMENTATION ONLY (OUTPATIENT)
Dept: OTHER | Facility: CLINIC | Age: 84
End: 2024-11-05
Payer: MEDICARE

## 2024-12-10 NOTE — PLAN OF CARE
DATE & TIME: 05/7/23 7115-2275   Cognitive Concerns/ Orientation : A&O x 2. Disorientated to time and situation. Calm and cooperative. Garbled speech at times. Hx of prior stroke.   BEHAVIOR & AGGRESSION TOOL COLOR: Green  ABNL VS/O2: VSS on 2 LPM NC (chronic)  MOBILITY: Total care. Lift, turn and repo, hx of stroke w/ left sided weakness. Refused up to chair today, said chair was very uncomfortable yesterday. Gave ideas on how to make chair more comfortable, pt still declined.   PAIN MANAGMENT: Denies  DIET: Mod carb/Minced and moist/ mildly thick liquids by spoon only. Total feed.   BOWEL/BLADDER: Cardona   ABNL LAB/BG: , 145/191  DRAIN/DEVICES: PIV SL, Cardona  SKIN: Pale. Scattered bruising. Generalized edema +1-2. Wounds on buttock, mepilex in place, CDI. Chronic pressure injuries behind ears. Heels red blanchable. Elevated on pillows  TESTS/PROCEDURES: None  D/C DAY/GOALS/PLACE: 1-2 days back to LTC  OTHER IMPORTANT INFO: Contact precautions maintained. Speech following. Take pills whole in applesauce.    Not indicated

## 2024-12-26 ENCOUNTER — HOSPITAL ENCOUNTER (INPATIENT)
Facility: CLINIC | Age: 84
End: 2024-12-26
Attending: EMERGENCY MEDICINE | Admitting: STUDENT IN AN ORGANIZED HEALTH CARE EDUCATION/TRAINING PROGRAM
Payer: MEDICARE

## 2024-12-26 ENCOUNTER — APPOINTMENT (OUTPATIENT)
Dept: CT IMAGING | Facility: CLINIC | Age: 84
End: 2024-12-26
Attending: EMERGENCY MEDICINE
Payer: MEDICARE

## 2024-12-26 VITALS
HEART RATE: 109 BPM | SYSTOLIC BLOOD PRESSURE: 130 MMHG | HEIGHT: 72 IN | BODY MASS INDEX: 29.8 KG/M2 | TEMPERATURE: 101.9 F | DIASTOLIC BLOOD PRESSURE: 66 MMHG | RESPIRATION RATE: 20 BRPM | OXYGEN SATURATION: 93 % | WEIGHT: 220 LBS

## 2024-12-26 DIAGNOSIS — N39.0 URINARY TRACT INFECTION ASSOCIATED WITH INDWELLING URETHRAL CATHETER, INITIAL ENCOUNTER: Primary | ICD-10-CM

## 2024-12-26 DIAGNOSIS — T83.511A URINARY TRACT INFECTION ASSOCIATED WITH INDWELLING URETHRAL CATHETER, INITIAL ENCOUNTER: Primary | ICD-10-CM

## 2024-12-26 DIAGNOSIS — N39.0 URINARY TRACT INFECTION WITHOUT HEMATURIA, SITE UNSPECIFIED: ICD-10-CM

## 2024-12-26 DIAGNOSIS — J96.22 ACUTE ON CHRONIC RESPIRATORY FAILURE WITH HYPERCAPNIA (H): ICD-10-CM

## 2024-12-26 DIAGNOSIS — J96.21 ACUTE ON CHRONIC RESPIRATORY FAILURE WITH HYPOXIA (H): ICD-10-CM

## 2024-12-26 LAB
ALBUMIN SERPL BCG-MCNC: 3.5 G/DL (ref 3.5–5.2)
ALBUMIN UR-MCNC: 10 MG/DL
ALP SERPL-CCNC: 77 U/L (ref 40–150)
ALT SERPL W P-5'-P-CCNC: 15 U/L (ref 0–70)
ANION GAP SERPL CALCULATED.3IONS-SCNC: 7 MMOL/L (ref 7–15)
APPEARANCE UR: ABNORMAL
AST SERPL W P-5'-P-CCNC: 19 U/L (ref 0–45)
ATRIAL RATE - MUSE: 105 BPM
BACTERIA #/AREA URNS HPF: ABNORMAL /HPF
BASOPHILS # BLD AUTO: 0 10E3/UL (ref 0–0.2)
BASOPHILS NFR BLD AUTO: 0 %
BILIRUB SERPL-MCNC: 0.3 MG/DL
BILIRUB UR QL STRIP: NEGATIVE
BUN SERPL-MCNC: 19.7 MG/DL (ref 8–23)
CALCIUM SERPL-MCNC: 8.9 MG/DL (ref 8.8–10.4)
CHLORIDE SERPL-SCNC: 100 MMOL/L (ref 98–107)
COLOR UR AUTO: YELLOW
CREAT SERPL-MCNC: 1.05 MG/DL (ref 0.67–1.17)
DIASTOLIC BLOOD PRESSURE - MUSE: NORMAL MMHG
EGFRCR SERPLBLD CKD-EPI 2021: 71 ML/MIN/1.73M2
EOSINOPHIL # BLD AUTO: 0.2 10E3/UL (ref 0–0.7)
EOSINOPHIL NFR BLD AUTO: 2 %
ERYTHROCYTE [DISTWIDTH] IN BLOOD BY AUTOMATED COUNT: 19.8 % (ref 10–15)
EST. AVERAGE GLUCOSE BLD GHB EST-MCNC: 137 MG/DL
FLUAV RNA SPEC QL NAA+PROBE: NEGATIVE
FLUBV RNA RESP QL NAA+PROBE: NEGATIVE
GLUCOSE BLDC GLUCOMTR-MCNC: 113 MG/DL (ref 70–99)
GLUCOSE SERPL-MCNC: 152 MG/DL (ref 70–99)
GLUCOSE UR STRIP-MCNC: NEGATIVE MG/DL
HBA1C MFR BLD: 6.4 %
HCO3 SERPL-SCNC: 31 MMOL/L (ref 22–29)
HCT VFR BLD AUTO: 34.9 % (ref 40–53)
HGB BLD-MCNC: 10.5 G/DL (ref 13.3–17.7)
HGB UR QL STRIP: NEGATIVE
HOLD SPECIMEN: NORMAL
IMM GRANULOCYTES # BLD: 0 10E3/UL
IMM GRANULOCYTES NFR BLD: 1 %
INTERPRETATION ECG - MUSE: NORMAL
KETONES UR STRIP-MCNC: NEGATIVE MG/DL
LACTATE SERPL-SCNC: 1.5 MMOL/L (ref 0.7–2)
LEUKOCYTE ESTERASE UR QL STRIP: ABNORMAL
LYMPHOCYTES # BLD AUTO: 0.2 10E3/UL (ref 0.8–5.3)
LYMPHOCYTES NFR BLD AUTO: 3 %
MCH RBC QN AUTO: 25.2 PG (ref 26.5–33)
MCHC RBC AUTO-ENTMCNC: 30.1 G/DL (ref 31.5–36.5)
MCV RBC AUTO: 84 FL (ref 78–100)
MONOCYTES # BLD AUTO: 0.8 10E3/UL (ref 0–1.3)
MONOCYTES NFR BLD AUTO: 10 %
MUCOUS THREADS #/AREA URNS LPF: PRESENT /LPF
NEUTROPHILS # BLD AUTO: 6.8 10E3/UL (ref 1.6–8.3)
NEUTROPHILS NFR BLD AUTO: 85 %
NITRATE UR QL: POSITIVE
NRBC # BLD AUTO: 0 10E3/UL
NRBC BLD AUTO-RTO: 0 /100
NT-PROBNP SERPL-MCNC: 785 PG/ML (ref 0–1800)
P AXIS - MUSE: 34 DEGREES
PH UR STRIP: 5.5 [PH] (ref 5–7)
PLATELET # BLD AUTO: 172 10E3/UL (ref 150–450)
POTASSIUM SERPL-SCNC: 4.2 MMOL/L (ref 3.4–5.3)
PR INTERVAL - MUSE: 188 MS
PROT SERPL-MCNC: 7.4 G/DL (ref 6.4–8.3)
QRS DURATION - MUSE: 126 MS
QT - MUSE: 358 MS
QTC - MUSE: 473 MS
R AXIS - MUSE: 1 DEGREES
RBC # BLD AUTO: 4.16 10E6/UL (ref 4.4–5.9)
RBC URINE: 7 /HPF
RSV RNA SPEC NAA+PROBE: NEGATIVE
SARS-COV-2 RNA RESP QL NAA+PROBE: NEGATIVE
SODIUM SERPL-SCNC: 138 MMOL/L (ref 135–145)
SP GR UR STRIP: 1.02 (ref 1–1.03)
SQUAMOUS EPITHELIAL: <1 /HPF
SYSTOLIC BLOOD PRESSURE - MUSE: NORMAL MMHG
T AXIS - MUSE: 37 DEGREES
TROPONIN T SERPL HS-MCNC: 26 NG/L
TROPONIN T SERPL HS-MCNC: 28 NG/L
UROBILINOGEN UR STRIP-MCNC: NORMAL MG/DL
VENTRICULAR RATE- MUSE: 105 BPM
WBC # BLD AUTO: 8 10E3/UL (ref 4–11)
WBC URINE: 52 /HPF

## 2024-12-26 PROCEDURE — 250N000009 HC RX 250: Performed by: STUDENT IN AN ORGANIZED HEALTH CARE EDUCATION/TRAINING PROGRAM

## 2024-12-26 PROCEDURE — 36415 COLL VENOUS BLD VENIPUNCTURE: CPT | Performed by: STUDENT IN AN ORGANIZED HEALTH CARE EDUCATION/TRAINING PROGRAM

## 2024-12-26 PROCEDURE — 87040 BLOOD CULTURE FOR BACTERIA: CPT | Performed by: STUDENT IN AN ORGANIZED HEALTH CARE EDUCATION/TRAINING PROGRAM

## 2024-12-26 PROCEDURE — 84484 ASSAY OF TROPONIN QUANT: CPT | Performed by: EMERGENCY MEDICINE

## 2024-12-26 PROCEDURE — 96365 THER/PROPH/DIAG IV INF INIT: CPT | Mod: 59

## 2024-12-26 PROCEDURE — 93005 ELECTROCARDIOGRAM TRACING: CPT

## 2024-12-26 PROCEDURE — 81001 URINALYSIS AUTO W/SCOPE: CPT | Performed by: EMERGENCY MEDICINE

## 2024-12-26 PROCEDURE — 80053 COMPREHEN METABOLIC PANEL: CPT | Performed by: EMERGENCY MEDICINE

## 2024-12-26 PROCEDURE — 83605 ASSAY OF LACTIC ACID: CPT | Performed by: EMERGENCY MEDICINE

## 2024-12-26 PROCEDURE — 250N000013 HC RX MED GY IP 250 OP 250 PS 637: Performed by: STUDENT IN AN ORGANIZED HEALTH CARE EDUCATION/TRAINING PROGRAM

## 2024-12-26 PROCEDURE — 87637 SARSCOV2&INF A&B&RSV AMP PRB: CPT | Performed by: EMERGENCY MEDICINE

## 2024-12-26 PROCEDURE — 87040 BLOOD CULTURE FOR BACTERIA: CPT | Performed by: EMERGENCY MEDICINE

## 2024-12-26 PROCEDURE — 120N000001 HC R&B MED SURG/OB

## 2024-12-26 PROCEDURE — 83880 ASSAY OF NATRIURETIC PEPTIDE: CPT | Performed by: EMERGENCY MEDICINE

## 2024-12-26 PROCEDURE — 258N000003 HC RX IP 258 OP 636: Performed by: EMERGENCY MEDICINE

## 2024-12-26 PROCEDURE — 99291 CRITICAL CARE FIRST HOUR: CPT | Mod: 25

## 2024-12-26 PROCEDURE — 82435 ASSAY OF BLOOD CHLORIDE: CPT | Performed by: EMERGENCY MEDICINE

## 2024-12-26 PROCEDURE — 250N000011 HC RX IP 250 OP 636: Performed by: EMERGENCY MEDICINE

## 2024-12-26 PROCEDURE — G1010 CDSM STANSON: HCPCS

## 2024-12-26 PROCEDURE — 96361 HYDRATE IV INFUSION ADD-ON: CPT

## 2024-12-26 PROCEDURE — 83036 HEMOGLOBIN GLYCOSYLATED A1C: CPT | Performed by: STUDENT IN AN ORGANIZED HEALTH CARE EDUCATION/TRAINING PROGRAM

## 2024-12-26 PROCEDURE — 85014 HEMATOCRIT: CPT | Performed by: EMERGENCY MEDICINE

## 2024-12-26 PROCEDURE — 250N000009 HC RX 250: Performed by: EMERGENCY MEDICINE

## 2024-12-26 PROCEDURE — 87186 SC STD MICRODIL/AGAR DIL: CPT | Performed by: EMERGENCY MEDICINE

## 2024-12-26 PROCEDURE — 85049 AUTOMATED PLATELET COUNT: CPT | Performed by: EMERGENCY MEDICINE

## 2024-12-26 PROCEDURE — 99223 1ST HOSP IP/OBS HIGH 75: CPT | Mod: AI | Performed by: STUDENT IN AN ORGANIZED HEALTH CARE EDUCATION/TRAINING PROGRAM

## 2024-12-26 PROCEDURE — 36415 COLL VENOUS BLD VENIPUNCTURE: CPT | Performed by: EMERGENCY MEDICINE

## 2024-12-26 RX ORDER — ASPIRIN 81 MG/1
81 TABLET ORAL DAILY
Status: ACTIVE | OUTPATIENT
Start: 2024-12-27

## 2024-12-26 RX ORDER — FORMOTEROL FUMARATE DIHYDRATE 20 UG/2ML
20 SOLUTION RESPIRATORY (INHALATION) EVERY 12 HOURS
Status: DISPENSED | OUTPATIENT
Start: 2024-12-26

## 2024-12-26 RX ORDER — GLIPIZIDE 10 MG/1
2 TABLET ORAL 2 TIMES DAILY
Status: DISPENSED | OUTPATIENT
Start: 2024-12-26

## 2024-12-26 RX ORDER — LIDOCAINE 40 MG/G
CREAM TOPICAL
Status: ACTIVE | OUTPATIENT
Start: 2024-12-26

## 2024-12-26 RX ORDER — SERTRALINE HYDROCHLORIDE 100 MG/1
100 TABLET, FILM COATED ORAL DAILY
Status: ACTIVE | OUTPATIENT
Start: 2024-12-27

## 2024-12-26 RX ORDER — ALLOPURINOL 300 MG/1
300 TABLET ORAL EVERY MORNING
Status: ACTIVE | OUTPATIENT
Start: 2024-12-27

## 2024-12-26 RX ORDER — ONDANSETRON 4 MG/1
4 TABLET, ORALLY DISINTEGRATING ORAL EVERY 6 HOURS PRN
Status: ACTIVE | OUTPATIENT
Start: 2024-12-26

## 2024-12-26 RX ORDER — IOPAMIDOL 755 MG/ML
77 INJECTION, SOLUTION INTRAVASCULAR ONCE
Status: COMPLETED | OUTPATIENT
Start: 2024-12-26 | End: 2024-12-26

## 2024-12-26 RX ORDER — PANTOPRAZOLE SODIUM 40 MG/1
40 TABLET, DELAYED RELEASE ORAL 2 TIMES DAILY
Status: DISPENSED | OUTPATIENT
Start: 2024-12-26

## 2024-12-26 RX ORDER — CEFEPIME HYDROCHLORIDE 2 G/1
2 INJECTION, POWDER, FOR SOLUTION INTRAVENOUS EVERY 8 HOURS
Status: ACTIVE | OUTPATIENT
Start: 2024-12-27

## 2024-12-26 RX ORDER — SIMETHICONE 125 MG
125 TABLET,CHEWABLE ORAL 3 TIMES DAILY PRN
Status: ACTIVE | OUTPATIENT
Start: 2024-12-26

## 2024-12-26 RX ORDER — CEFEPIME HYDROCHLORIDE 2 G/1
2 INJECTION, POWDER, FOR SOLUTION INTRAVENOUS ONCE
Status: COMPLETED | OUTPATIENT
Start: 2024-12-26 | End: 2024-12-26

## 2024-12-26 RX ORDER — PROCHLORPERAZINE MALEATE 5 MG/1
5 TABLET ORAL EVERY 6 HOURS PRN
Status: ACTIVE | OUTPATIENT
Start: 2024-12-26

## 2024-12-26 RX ORDER — AMOXICILLIN 250 MG
2 CAPSULE ORAL 2 TIMES DAILY PRN
Status: ACTIVE | OUTPATIENT
Start: 2024-12-26

## 2024-12-26 RX ORDER — FUROSEMIDE 20 MG/1
20 TABLET ORAL DAILY
Status: ACTIVE | OUTPATIENT
Start: 2024-12-27

## 2024-12-26 RX ORDER — AMOXICILLIN 250 MG
1 CAPSULE ORAL 2 TIMES DAILY PRN
Status: ACTIVE | OUTPATIENT
Start: 2024-12-26

## 2024-12-26 RX ORDER — ACETAMINOPHEN 650 MG/1
650 SUPPOSITORY RECTAL EVERY 4 HOURS PRN
Status: ACTIVE | OUTPATIENT
Start: 2024-12-26

## 2024-12-26 RX ORDER — BISACODYL 10 MG
10 SUPPOSITORY, RECTAL RECTAL
Status: ACTIVE | OUTPATIENT
Start: 2024-12-27

## 2024-12-26 RX ORDER — ACETAMINOPHEN 325 MG/1
650 TABLET ORAL EVERY 4 HOURS PRN
Status: DISPENSED | OUTPATIENT
Start: 2024-12-26

## 2024-12-26 RX ORDER — CALCIUM CARBONATE 500 MG/1
1000 TABLET, CHEWABLE ORAL 4 TIMES DAILY PRN
Status: ACTIVE | OUTPATIENT
Start: 2024-12-26

## 2024-12-26 RX ORDER — METHENAMINE HIPPURATE 1000 MG/1
1 TABLET ORAL 2 TIMES DAILY
Status: DISPENSED | OUTPATIENT
Start: 2024-12-26

## 2024-12-26 RX ORDER — NICOTINE POLACRILEX 4 MG
15-30 LOZENGE BUCCAL
Status: ACTIVE | OUTPATIENT
Start: 2024-12-26

## 2024-12-26 RX ORDER — ONDANSETRON 2 MG/ML
4 INJECTION INTRAMUSCULAR; INTRAVENOUS EVERY 6 HOURS PRN
Status: ACTIVE | OUTPATIENT
Start: 2024-12-26

## 2024-12-26 RX ORDER — ACETAMINOPHEN 325 MG/1
650 TABLET ORAL EVERY 4 HOURS PRN
Status: DISCONTINUED | OUTPATIENT
Start: 2024-12-26 | End: 2024-12-26

## 2024-12-26 RX ORDER — POLYETHYLENE GLYCOL 3350 17 G/17G
17 POWDER, FOR SOLUTION ORAL 2 TIMES DAILY PRN
Status: ACTIVE | OUTPATIENT
Start: 2024-12-26

## 2024-12-26 RX ORDER — DEXTROSE MONOHYDRATE 25 G/50ML
25-50 INJECTION, SOLUTION INTRAVENOUS
Status: ACTIVE | OUTPATIENT
Start: 2024-12-26

## 2024-12-26 RX ORDER — IPRATROPIUM BROMIDE AND ALBUTEROL SULFATE 2.5; .5 MG/3ML; MG/3ML
1 SOLUTION RESPIRATORY (INHALATION) 3 TIMES DAILY PRN
Status: ACTIVE | OUTPATIENT
Start: 2024-12-26

## 2024-12-26 RX ORDER — LOPERAMIDE HYDROCHLORIDE 2 MG/1
2 CAPSULE ORAL EVERY 6 HOURS PRN
Status: ACTIVE | OUTPATIENT
Start: 2024-12-26

## 2024-12-26 RX ORDER — AMOXICILLIN 250 MG
2 CAPSULE ORAL 2 TIMES DAILY
Status: DISPENSED | OUTPATIENT
Start: 2024-12-26

## 2024-12-26 RX ORDER — ATORVASTATIN CALCIUM 10 MG/1
10 TABLET, FILM COATED ORAL EVERY MORNING
Status: ACTIVE | OUTPATIENT
Start: 2024-12-27

## 2024-12-26 RX ADMIN — METOPROLOL TARTRATE 12.5 MG: 25 TABLET, FILM COATED ORAL at 23:06

## 2024-12-26 RX ADMIN — DEXTRAN 70, GLYCERIN, HYPROMELLOSE 2 DROP: 1; 2; 3 SOLUTION/ DROPS OPHTHALMIC at 23:06

## 2024-12-26 RX ADMIN — SODIUM CHLORIDE 100 ML: 9 INJECTION, SOLUTION INTRAVENOUS at 17:39

## 2024-12-26 RX ADMIN — CEFEPIME 2 G: 2 INJECTION, POWDER, FOR SOLUTION INTRAVENOUS at 19:01

## 2024-12-26 RX ADMIN — IPRATROPIUM BROMIDE AND ALBUTEROL 1 PUFF: 20; 100 SPRAY, METERED RESPIRATORY (INHALATION) at 23:15

## 2024-12-26 RX ADMIN — SODIUM CHLORIDE 1000 ML: 9 INJECTION, SOLUTION INTRAVENOUS at 20:01

## 2024-12-26 RX ADMIN — IOPAMIDOL 77 ML: 755 INJECTION, SOLUTION INTRAVENOUS at 17:39

## 2024-12-26 RX ADMIN — SENNOSIDES AND DOCUSATE SODIUM 2 TABLET: 50; 8.6 TABLET ORAL at 23:06

## 2024-12-26 RX ADMIN — PANTOPRAZOLE SODIUM 40 MG: 40 TABLET, DELAYED RELEASE ORAL at 23:06

## 2024-12-26 RX ADMIN — METHENAMINE HIPPURATE 1 G: 1 TABLET ORAL at 23:06

## 2024-12-26 RX ADMIN — SODIUM CHLORIDE 1000 ML: 9 INJECTION, SOLUTION INTRAVENOUS at 16:18

## 2024-12-26 RX ADMIN — ACETAMINOPHEN 650 MG: 325 TABLET, FILM COATED ORAL at 23:06

## 2024-12-26 ASSESSMENT — ACTIVITIES OF DAILY LIVING (ADL)
ADLS_ACUITY_SCORE: 61

## 2024-12-26 NOTE — ED TRIAGE NOTES
Patient brought in by EMS from [facility]. Patient comes in with increased work of breathing. Patient states SOB and has a cough. Has felt warm all day. Staff at facility concerned he aspirated.     Triage Assessment (Adult)       Row Name 12/26/24 1531          Triage Assessment    Airway WDL WDL        Respiratory WDL    Respiratory WDL X;rhythm/pattern;expansion/retractions;cough     Rhythm/Pattern, Respiratory shortness of breath;labored;dyspnea upon exertion     Expansion/Accessory Muscles/Retractions abdominal muscle use     Cough Frequency frequent     Cough Type congested        Skin Circulation/Temperature WDL    Skin Circulation/Temperature WDL WDL        Cardiac WDL    Cardiac WDL WDL        Peripheral/Neurovascular WDL    Peripheral Neurovascular WDL WDL        Cognitive/Neuro/Behavioral WDL    Cognitive/Neuro/Behavioral WDL WDL

## 2024-12-26 NOTE — ED PROVIDER NOTES
"  Emergency Department Note      History of Present Illness     Chief Complaint   No chief complaint on file.    HPI   Ron Gilmore is a 84 year old male who presents for evaluation of fever, cough, shortness of breath.  He reports started feeling unwell overnight.  He feels warm and like he is \"burning up.\" He has a productive cough.  He has chronic left hemiplegia and is bedbound.  No known sick contacts.  He has no chest pain.  No abdominal pain.  No vomiting or diarrhea.    Independent Historian   None    Review of External Notes   Discharge summary from this facility dated 10/31/2024 where he was seen and treated for likely aspiration pneumonia and sepsis.    Past Medical History     Medical History and Problem List   Past Medical History:   Diagnosis Date    BPH (benign prostatic hyperplasia)     Cataract     Cholelithiasis     COPD (chronic obstructive pulmonary disease) (H)     Depression     Diabetes mellitus     Dyshidrotic foot dermatitis     Edema     Gout     Hyperlipidemia     Hypertension     Infection due to 2019 novel coronavirus 5/1/2021    Kidney stones     Lumbago     Lumbar disc displacement without myelopathy     Muscle weakness     Neuropathy, diabetic (H)     Obesity     Spinal stenosis     Stroke (H)     Unsteady gait     Urinary retention with incomplete bladder emptying     UTI (urinary tract infection)     Vasovagal episode        Medications   acetaminophen (TYLENOL) 325 MG tablet  allopurinol (ZYLOPRIM) 300 MG tablet  ammonium lactate (LAC-HYDRIN) 12 % external lotion  aspirin (ASA) 81 MG EC tablet  atorvastatin (LIPITOR) 10 MG tablet  bisacodyl (DULCOLAX) 10 MG suppository  formoterol (PERFOROMIST) 20 MCG/2ML neb solution  furosemide (LASIX) 20 MG tablet  hypromellose (ARTIFICIAL TEARS) 0.5 % SOLN ophthalmic solution  hypromellose (ARTIFICIAL TEARS) 0.5 % SOLN ophthalmic solution  ipratropium - albuterol 0.5 mg/2.5 mg/3 mL (DUONEB) 0.5-2.5 (3) MG/3ML neb " solution  ipratropium-albuterol (COMBIVENT RESPIMAT)  MCG/ACT inhaler  loperamide (IMODIUM) 2 MG capsule  melatonin 3 MG tablet  methenamine hippurate (HIPREX) 1 g tablet  metoprolol tartrate (LOPRESSOR) 25 MG tablet  pantoprazole (PROTONIX) 40 MG EC tablet  senna-docusate (SENOKOT-S/PERICOLACE) 8.6-50 MG tablet  senna-docusate (SENOKOT-S/PERICOLACE) 8.6-50 MG tablet  sertraline (ZOLOFT) 50 MG tablet  simethicone (MYLICON) 125 MG chewable tablet  Vitamin D3 (VITAMIN D, CHOLECALCIFEROL,) 25 mcg (1000 units) tablet        Surgical History   Past Surgical History:   Procedure Laterality Date    APPENDECTOMY OPEN      ARTHROSCOPY SHOULDER ROTATOR CUFF REPAIR      cataracts Bilateral     CHOLECYSTECTOMY      COLONOSCOPY  1986    COLONOSCOPY N/A 5/29/2021    Procedure: COLONOSCOPY;  Surgeon: Kofi Davis MD;  Location:  GI    CYSTOSCOPY  10/19/2011    Procedure:CYSTOSCOPY; CYSTOSCOPY, BLADDER STONE REMOVAL; Surgeon:ROB SAWYER; Location: OR    CYSTOSCOPY, TRANSURETHRAL RESECTION (TUR) PROSTATE, COMBINED N/A 2/21/2018    Procedure: COMBINED CYSTOSCOPY, TRANSURETHRAL RESECTION (TUR) PROSTATE;  COMBINED CYSTOSCOPY, TRANSURETHRAL RESECTION (TUR) PROSTATE ;  Surgeon: Rob Sawyer MD;  Location:  OR    EP LOOP RECORDER IMPLANT N/A 1/20/2020    Procedure: EP Loop Recorder Implant;  Surgeon: Evgeny Parisi MD;  Location:  [facility]    ESOPHAGOSCOPY, GASTROSCOPY, DUODENOSCOPY (EGD), COMBINED N/A 5/28/2021    Procedure: ESOPHAGOGASTRODUODENOSCOPY (EGD);  Surgeon: Aurora Waterman MD;  Location:  GI    ESOPHAGOSCOPY, GASTROSCOPY, DUODENOSCOPY (EGD), DILATATION, COMBINED N/A 9/24/2022    Procedure: ESOPHAGOGASTRODUODENOSCOPY, WITH DILATION;  Surgeon: Kofi Davis MD;  Location:  GI    EYE SURGERY      right lid surgery     IR IVC FILTER PLACEMENT  5/24/2021    IR NEPHROSTOMY TUBE PLACEMENT RIGHT  3/9/2021    IR URETERAL STENT PLACEMENT RIGHT  3/16/2021    JOINT  REPLACEMENT Right     HIP    KNEE SURGERY Bilateral     LAMINECTOMY LUMBAR ONE LEVEL      LASER HOLMIUM LITHOTRIPSY URETER(S), INSERT STENT, COMBINED Right 4/14/2021    Procedure: CYSTOSCOPY, BLADDER STONE REMOVAL, RIGHT URETEROSCOPY, HOLMIUM LASER LITHOTRIPSY, AND RIGHT STENT REMOVAL, RIGHT RETROGRADE;  Surgeon: Rob Sullivan MD;  Location:  OR    TONSILLECTOMY         Physical Exam     No data found.  Physical Exam  Constitutional: Nontoxic appearing.  HEENT: Atraumatic.  PERRL.  EOMI.  Moist mucous membranes.  Neck: Soft.  Supple.  No JVD.  Cardiac: Regular rate and rhythm.  No murmur or rub.  Respiratory: Clear to auscultation bilaterally.  No respiratory distress.  No wheezing, rhonchi, or rales.  Abdomen: Soft and nontender.  No rebound or guarding.  Nondistended.  Musculoskeletal: No edema.  Normal range of motion.  Neurologic: Alert and oriented x3.  Normal tone and bulk.  Left hemiplegia.  5 out of 5/5 strength right upper and lower extremities.    Skin: No rashes.  No edema.  Psych: Normal affect.  Normal behavior.            Diagnostics     Lab Results   Labs Ordered and Resulted from Time of ED Arrival to Time of ED Departure   COMPREHENSIVE METABOLIC PANEL - Abnormal       Result Value    Sodium 138      Potassium 4.2      Carbon Dioxide (CO2) 31 (*)     Anion Gap 7      Urea Nitrogen 19.7      Creatinine 1.05      GFR Estimate 71      Calcium 8.9      Chloride 100      Glucose 152 (*)     Alkaline Phosphatase 77      AST 19      ALT 15      Protein Total 7.4      Albumin 3.5      Bilirubin Total 0.3     CBC WITH PLATELETS AND DIFFERENTIAL - Abnormal    WBC Count 8.0      RBC Count 4.16 (*)     Hemoglobin 10.5 (*)     Hematocrit 34.9 (*)     MCV 84      MCH 25.2 (*)     MCHC 30.1 (*)     RDW 19.8 (*)     Platelet Count 172      % Neutrophils 85      % Lymphocytes 3      % Monocytes 10      % Eosinophils 2      % Basophils 0      % Immature Granulocytes 1      NRBCs per 100 WBC 0      Absolute  Neutrophils 6.8      Absolute Lymphocytes 0.2 (*)     Absolute Monocytes 0.8      Absolute Eosinophils 0.2      Absolute Basophils 0.0      Absolute Immature Granulocytes 0.0      Absolute NRBCs 0.0     TROPONIN T, HIGH SENSITIVITY - Abnormal    Troponin T, High Sensitivity 28 (*)    ROUTINE UA WITH MICROSCOPIC REFLEX TO CULTURE - Abnormal    Color Urine Yellow      Appearance Urine Slightly Cloudy (*)     Glucose Urine Negative      Bilirubin Urine Negative      Ketones Urine Negative      Specific Gravity Urine 1.020      Blood Urine Negative      pH Urine 5.5      Protein Albumin Urine 10 (*)     Urobilinogen Urine Normal      Nitrite Urine Positive (*)     Leukocyte Esterase Urine Large (*)     Bacteria Urine Few (*)     Mucus Urine Present (*)     RBC Urine 7 (*)     WBC Urine 52 (*)     Squamous Epithelials Urine <1     TROPONIN T, HIGH SENSITIVITY - Abnormal    Troponin T, High Sensitivity 26 (*)    LACTIC ACID WHOLE BLOOD - Normal    Lactic Acid 1.5     NT PROBNP INPATIENT - Normal    N terminal Pro BNP Inpatient 785     INFLUENZA A/B, RSV AND SARS-COV2 PCR - Normal    Influenza A PCR Negative      Influenza B PCR Negative      RSV PCR Negative      SARS CoV2 PCR Negative     URINE CULTURE   BLOOD CULTURE   BLOOD CULTURE       Imaging   CT Chest Pulmonary Embolism w Contrast   Final Result   IMPRESSION:   1.  Possible mild bronchitis without jean airspace consolidation or pleural effusion.   2.  No pulmonary embolus.   3.  Persistent enlarged mediastinal and hilar lymph nodes which again could be reactive but consider follow-up chest CT in 6 months to document stability/resolution.          EKG   ECG results from 12/26/24   EKG 12-lead, tracing only     Value    Systolic Blood Pressure     Diastolic Blood Pressure     Ventricular Rate 105    Atrial Rate 105    DE Interval 188    QRS Duration 126        QTc 473    P Axis 34    R AXIS 1    T Axis 37    Interpretation ECG      Sinus tachycardia with  occasional Premature ventricular complexes  Right bundle branch block  Abnormal ECG  When compared with ECG of 29-Oct-2024 10:24,  Premature ventricular complexes are now Present  Inverted T waves have replaced nonspecific T wave abnormality in Anterior leads  Confirmed by GENERATED REPORT, COMPUTER ([id number]),  DALLAS RODRIGUES ([id number]) on 12/26/2024 4:03:17 PM           Independent Interpretation   None    ED Course      Medications Administered   IV cefepime 2 g  IV fluids 2 L    Procedures   Procedures     Discussion of Management   Admitting Hospitalist,      ED Course        Additional Documentation  None    Medical Decision Making / Diagnosis     CMS Diagnoses: None    MIPS       CT for PE was ordered because the patient is high risk for pulmonary embolism.    LIZANDRO Gilmore is a 84 year old male who presents with fever and tachycardia and increased work of breathing.  He has increased oxygen requirement.  CT scan of his chest was done with PE protocol given high risk factors given bedbound status.  Thankfully, no PE but evidence of bronchitis.  UA is concerning for infection, however, unclear if it is colonization given his catheter.  Given his fever, tachycardia, increased work of breathing and oxygen requirement I given his IV cefepime initially for UTI and concern for pneumonia.  Discussed plan for admission and he is in agreement.  Spoke with hospital service who accepts for admission and he is in stable condition at time of admission.    Disposition   The patient was admitted to the hospital.     Diagnosis     ICD-10-CM    1. Acute on chronic respiratory failure with hypoxia (H)  J96.21       2. Urinary tract infection without hematuria, site unspecified  N39.0            Discharge Medications   New Prescriptions    No medications on file         MD Jaz Sheriff Nicholas J, MD  12/26/24 2148

## 2024-12-27 LAB
ANION GAP SERPL CALCULATED.3IONS-SCNC: 11 MMOL/L (ref 7–15)
BUN SERPL-MCNC: 20.4 MG/DL (ref 8–23)
CALCIUM SERPL-MCNC: 8.4 MG/DL (ref 8.8–10.4)
CHLORIDE SERPL-SCNC: 104 MMOL/L (ref 98–107)
CREAT SERPL-MCNC: 0.97 MG/DL (ref 0.67–1.17)
EGFRCR SERPLBLD CKD-EPI 2021: 78 ML/MIN/1.73M2
ERYTHROCYTE [DISTWIDTH] IN BLOOD BY AUTOMATED COUNT: 19.9 % (ref 10–15)
GLUCOSE BLDC GLUCOMTR-MCNC: 105 MG/DL (ref 70–99)
GLUCOSE BLDC GLUCOMTR-MCNC: 116 MG/DL (ref 70–99)
GLUCOSE BLDC GLUCOMTR-MCNC: 118 MG/DL (ref 70–99)
GLUCOSE BLDC GLUCOMTR-MCNC: 118 MG/DL (ref 70–99)
GLUCOSE BLDC GLUCOMTR-MCNC: 129 MG/DL (ref 70–99)
GLUCOSE SERPL-MCNC: 115 MG/DL (ref 70–99)
HCO3 SERPL-SCNC: 25 MMOL/L (ref 22–29)
HCT VFR BLD AUTO: 33.7 % (ref 40–53)
HGB BLD-MCNC: 9.7 G/DL (ref 13.3–17.7)
MCH RBC QN AUTO: 24.6 PG (ref 26.5–33)
MCHC RBC AUTO-ENTMCNC: 28.8 G/DL (ref 31.5–36.5)
MCV RBC AUTO: 85 FL (ref 78–100)
PLATELET # BLD AUTO: 166 10E3/UL (ref 150–450)
POTASSIUM SERPL-SCNC: 4.7 MMOL/L (ref 3.4–5.3)
RBC # BLD AUTO: 3.95 10E6/UL (ref 4.4–5.9)
SODIUM SERPL-SCNC: 140 MMOL/L (ref 135–145)
WBC # BLD AUTO: 6.4 10E3/UL (ref 4–11)

## 2024-12-27 PROCEDURE — 250N000011 HC RX IP 250 OP 636: Performed by: STUDENT IN AN ORGANIZED HEALTH CARE EDUCATION/TRAINING PROGRAM

## 2024-12-27 PROCEDURE — 250N000013 HC RX MED GY IP 250 OP 250 PS 637: Performed by: STUDENT IN AN ORGANIZED HEALTH CARE EDUCATION/TRAINING PROGRAM

## 2024-12-27 PROCEDURE — 94640 AIRWAY INHALATION TREATMENT: CPT

## 2024-12-27 PROCEDURE — 36415 COLL VENOUS BLD VENIPUNCTURE: CPT | Performed by: STUDENT IN AN ORGANIZED HEALTH CARE EDUCATION/TRAINING PROGRAM

## 2024-12-27 PROCEDURE — 80048 BASIC METABOLIC PNL TOTAL CA: CPT | Performed by: STUDENT IN AN ORGANIZED HEALTH CARE EDUCATION/TRAINING PROGRAM

## 2024-12-27 PROCEDURE — 85018 HEMOGLOBIN: CPT | Performed by: STUDENT IN AN ORGANIZED HEALTH CARE EDUCATION/TRAINING PROGRAM

## 2024-12-27 PROCEDURE — 99232 SBSQ HOSP IP/OBS MODERATE 35: CPT | Performed by: HOSPITALIST

## 2024-12-27 PROCEDURE — 258N000003 HC RX IP 258 OP 636: Performed by: HOSPITALIST

## 2024-12-27 PROCEDURE — 999N000157 HC STATISTIC RCP TIME EA 10 MIN

## 2024-12-27 PROCEDURE — 82565 ASSAY OF CREATININE: CPT | Performed by: STUDENT IN AN ORGANIZED HEALTH CARE EDUCATION/TRAINING PROGRAM

## 2024-12-27 PROCEDURE — 94640 AIRWAY INHALATION TREATMENT: CPT | Mod: 76

## 2024-12-27 PROCEDURE — 250N000009 HC RX 250: Performed by: STUDENT IN AN ORGANIZED HEALTH CARE EDUCATION/TRAINING PROGRAM

## 2024-12-27 PROCEDURE — 120N000001 HC R&B MED SURG/OB

## 2024-12-27 PROCEDURE — 82310 ASSAY OF CALCIUM: CPT | Performed by: STUDENT IN AN ORGANIZED HEALTH CARE EDUCATION/TRAINING PROGRAM

## 2024-12-27 RX ORDER — SODIUM CHLORIDE 9 MG/ML
INJECTION, SOLUTION INTRAVENOUS CONTINUOUS
Status: DISCONTINUED | OUTPATIENT
Start: 2024-12-27 | End: 2024-12-28

## 2024-12-27 RX ADMIN — SERTRALINE HYDROCHLORIDE 100 MG: 100 TABLET ORAL at 08:36

## 2024-12-27 RX ADMIN — DEXTRAN 70, GLYCERIN, HYPROMELLOSE 2 DROP: 1; 2; 3 SOLUTION/ DROPS OPHTHALMIC at 08:37

## 2024-12-27 RX ADMIN — ASPIRIN 81 MG: 81 TABLET, COATED ORAL at 08:36

## 2024-12-27 RX ADMIN — DEXTRAN 70, GLYCERIN, HYPROMELLOSE 2 DROP: 1; 2; 3 SOLUTION/ DROPS OPHTHALMIC at 20:25

## 2024-12-27 RX ADMIN — FORMOTEROL FUMARATE DIHYDRATE 20 MCG: 20 SOLUTION RESPIRATORY (INHALATION) at 07:33

## 2024-12-27 RX ADMIN — PANTOPRAZOLE SODIUM 40 MG: 40 TABLET, DELAYED RELEASE ORAL at 08:36

## 2024-12-27 RX ADMIN — IPRATROPIUM BROMIDE AND ALBUTEROL 1 PUFF: 20; 100 SPRAY, METERED RESPIRATORY (INHALATION) at 17:32

## 2024-12-27 RX ADMIN — FUROSEMIDE 20 MG: 20 TABLET ORAL at 08:36

## 2024-12-27 RX ADMIN — ACETAMINOPHEN 650 MG: 325 TABLET, FILM COATED ORAL at 20:24

## 2024-12-27 RX ADMIN — CEFEPIME 2 G: 2 INJECTION, POWDER, FOR SOLUTION INTRAVENOUS at 10:50

## 2024-12-27 RX ADMIN — CEFEPIME 2 G: 2 INJECTION, POWDER, FOR SOLUTION INTRAVENOUS at 18:09

## 2024-12-27 RX ADMIN — FORMOTEROL FUMARATE DIHYDRATE 20 MCG: 20 SOLUTION RESPIRATORY (INHALATION) at 19:46

## 2024-12-27 RX ADMIN — IPRATROPIUM BROMIDE AND ALBUTEROL 1 PUFF: 20; 100 SPRAY, METERED RESPIRATORY (INHALATION) at 21:40

## 2024-12-27 RX ADMIN — ATORVASTATIN CALCIUM 10 MG: 10 TABLET, FILM COATED ORAL at 08:36

## 2024-12-27 RX ADMIN — IPRATROPIUM BROMIDE AND ALBUTEROL 1 PUFF: 20; 100 SPRAY, METERED RESPIRATORY (INHALATION) at 12:47

## 2024-12-27 RX ADMIN — METHENAMINE HIPPURATE 1 G: 1 TABLET ORAL at 08:36

## 2024-12-27 RX ADMIN — SENNOSIDES AND DOCUSATE SODIUM 2 TABLET: 50; 8.6 TABLET ORAL at 08:35

## 2024-12-27 RX ADMIN — SODIUM CHLORIDE: 9 INJECTION, SOLUTION INTRAVENOUS at 10:03

## 2024-12-27 RX ADMIN — METOPROLOL TARTRATE 12.5 MG: 25 TABLET, FILM COATED ORAL at 08:36

## 2024-12-27 RX ADMIN — METHENAMINE HIPPURATE 1 G: 1 TABLET ORAL at 20:24

## 2024-12-27 RX ADMIN — IPRATROPIUM BROMIDE AND ALBUTEROL 1 PUFF: 20; 100 SPRAY, METERED RESPIRATORY (INHALATION) at 08:37

## 2024-12-27 RX ADMIN — SODIUM CHLORIDE: 9 INJECTION, SOLUTION INTRAVENOUS at 21:40

## 2024-12-27 RX ADMIN — PANTOPRAZOLE SODIUM 40 MG: 40 TABLET, DELAYED RELEASE ORAL at 20:24

## 2024-12-27 RX ADMIN — ALLOPURINOL 300 MG: 300 TABLET ORAL at 08:36

## 2024-12-27 RX ADMIN — ACETAMINOPHEN 650 MG: 325 TABLET, FILM COATED ORAL at 08:34

## 2024-12-27 RX ADMIN — SENNOSIDES AND DOCUSATE SODIUM 2 TABLET: 50; 8.6 TABLET ORAL at 20:24

## 2024-12-27 RX ADMIN — METOPROLOL TARTRATE 12.5 MG: 25 TABLET, FILM COATED ORAL at 20:24

## 2024-12-27 RX ADMIN — CEFEPIME 2 G: 2 INJECTION, POWDER, FOR SOLUTION INTRAVENOUS at 04:16

## 2024-12-27 RX ADMIN — ACETAMINOPHEN 650 MG: 325 TABLET, FILM COATED ORAL at 15:27

## 2024-12-27 ASSESSMENT — ACTIVITIES OF DAILY LIVING (ADL)
ADLS_ACUITY_SCORE: 67
CONCENTRATING,_REMEMBERING_OR_MAKING_DECISIONS_DIFFICULTY: NO
ADLS_ACUITY_SCORE: 61
ADLS_ACUITY_SCORE: 74
ADLS_ACUITY_SCORE: 61
ADLS_ACUITY_SCORE: 74
WALKING_OR_CLIMBING_STAIRS: TRANSFERRING DIFFICULTY, DEPENDENT
HEARING_DIFFICULTY_OR_DEAF: NO
DRESSING/BATHING: BATHING DIFFICULTY, ASSISTANCE 1 PERSON
ADLS_ACUITY_SCORE: 77
ADLS_ACUITY_SCORE: 77
FALL_HISTORY_WITHIN_LAST_SIX_MONTHS: NO
ADLS_ACUITY_SCORE: 74
DOING_ERRANDS_INDEPENDENTLY_DIFFICULTY: YES
TOILETING: 2-->COMPLETELY DEPENDENT (NOT DEVELOPMENTALLY APPROPRIATE)
ADLS_ACUITY_SCORE: 74
ADLS_ACUITY_SCORE: 74
WALKING_OR_CLIMBING_STAIRS_DIFFICULTY: YES
ADLS_ACUITY_SCORE: 77
DIFFICULTY_COMMUNICATING: NO
DRESSING/BATHING_DIFFICULTY: YES
ADLS_ACUITY_SCORE: 67
ADLS_ACUITY_SCORE: 74
ADLS_ACUITY_SCORE: 77
ADLS_ACUITY_SCORE: 74
CHANGE_IN_FUNCTIONAL_STATUS_SINCE_ONSET_OF_CURRENT_ILLNESS/INJURY: NO
ADLS_ACUITY_SCORE: 74
TOILETING_ASSISTANCE: TOILETING DIFFICULTY, DEPENDENT
ADLS_ACUITY_SCORE: 77
TOILETING: 2-->COMPLETELY DEPENDENT
ADLS_ACUITY_SCORE: 74
ADLS_ACUITY_SCORE: 77
ADLS_ACUITY_SCORE: 74
EATING/SWALLOWING: OTHER (SEE COMMENTS)
DIFFICULTY_EATING/SWALLOWING: YES
WEAR_GLASSES_OR_BLIND: NO
ADLS_ACUITY_SCORE: 74
TOILETING_ISSUES: YES
ADLS_ACUITY_SCORE: 77
EQUIPMENT_CURRENTLY_USED_AT_HOME: LIFT DEVICE
ADLS_ACUITY_SCORE: 74

## 2024-12-27 NOTE — ED NOTES
[facility]  ED Nurse Handoff Report    ED Chief complaint: Shortness of Breath      ED Diagnosis:   Final diagnoses:   None       Code Status: not addressed by ED MD.    Allergies: No Known Allergies    Patient Story: Patient has history of stroke involving left side. Patient has contractures and hemiplegia of left arm and leg.  Focused Assessment:  Patient comes in today with complaints of SOB, and felt like he was too hot. Patient is on thickened liquids. Patient has a cough and has increased work of breathing. Patient uses 2L NC at baseline oxygen at 3 L NC.    Treatments and/or interventions provided: Antibiotics  Patient's response to treatments and/or interventions: Patient is stable    To be done/followed up on inpatient unit:  continue to monitor    Does this patient have any cognitive concerns?: Forgetful    Activity level - Baseline/Home:   Uses a aleks lift and wheelchair bound  Activity Level - Current:   Total Care    Patient's Preferred language: English   Needed?: No    Isolation: Contact   Infection: VRE  Patient tested for COVID 19 prior to admission: YES  Bariatric?: No    Vital Signs:   Vitals:    12/26/24 1530 12/26/24 1615 12/26/24 1800 12/26/24 1900   BP: 114/62 126/67 113/80 113/60   Pulse: 112 105 101 101   Resp: 12 20 20 23   Temp: 99.2  F (37.3  C)      TempSrc: Oral      SpO2: 91% 95% 94%    Weight: 99.8 kg (220 lb)      Height: 1.829 m (6')          Cardiac Rhythm:     Was the PSS-3 completed:   Yes  What interventions are required if any?               Family Comments: Not in attendance  OBS brochure/video discussed/provided to patient/family: No              Name of person given brochure if not patient: n/a              Relationship to patient: n/a    For the majority of the shift this patient's behavior was Green.   Behavioral interventions performed were none.    ED NURSE PHONE NUMBER: [phone]

## 2024-12-27 NOTE — PLAN OF CARE
Goal Outcome Evaluation:    Summary:  UTI, acute on chronic respiratory failure  DATE & TIME: 11/26/24 4658-1472    Cognitive Concerns/ Orientation : A&Ox4, can be forgetful at times   BEHAVIOR & AGGRESSION TOOL COLOR: green  CIWA SCORE: NA   ABNL VS/O2: VSS ex temp 101.9--99.6, on 4L NC  MOBILITY: 2A Lift  PAIN MANAGMENT: denies pain, given tylenol for fever  DIET: Pureed diet, moderately thick liquids, takes pills whole with apple sauce  BOWEL/BLADDER: chronic chirinos, no bm this shift  ABNL LAB/BG: ,129  DRAIN/DEVICES: PIV SL, Chirinos  TELEMETRY RHYTHM: SR w/PVC's  SKIN: Peeling to sacrum/coccyx mepi in place, redness to scrotum, and scattered bruising  TESTS/PROCEDURES: SLP consult  D/C DAY/GOALS/PLACE: pending  OTHER IMPORTANT INFO: urine & blood cultures pending, Patient came with only top set denture, patient unaware if he brought them to ED. Called ED and they did not find them. Patient requesting to ask [facility] if he left them there.

## 2024-12-27 NOTE — PLAN OF CARE
Goal Outcome Evaluation:       Summary:  UTI, acute on chronic respiratory failure  DATE & TIME: 11/27/24 9816-3336    Cognitive Concerns/ Orientation : A&Ox4, can be forgetful at times   BEHAVIOR & AGGRESSION TOOL COLOR: green  CIWA SCORE: NA   ABNL VS/O2: Max temp 102.2- tylenol given and ice packs x2, tachy at times, other vitals stable, on 4 liters oxygen (baseline 2 liters)   MOBILITY: 2 assist with Lift- turn and repositioning   PAIN MANAGMENT: denies pain, given tylenol for fever  DIET: Pureed diet, moderately thick liquids-poor appetite   BOWEL/BLADDER: chronic chirinos, no bm this shift  ABNL LAB/BG: , 116, 118, Hemoglobin 9.7, UC/BC pending   DRAIN/DEVICES: PIV NS infusing 75/hr, Chirinos  TELEMETRY RHYTHM: NSR  SKIN: Peeling to sacrum/coccyx mepi in place, redness to scrotum, and scattered bruising  TESTS/PROCEDURES: SLP consult  D/C DAY/GOALS/PLACE: pending improvement   OTHER IMPORTANT INFO: urine & blood cultures pending- Iv antibiotics

## 2024-12-27 NOTE — PHARMACY-ADMISSION MEDICATION HISTORY
Pharmacist Admission Medication History    Admission medication history is complete. The information provided in this note is only as accurate as the sources available at the time of the update.    Information Source(s): Facility ([facility]/) medication list/MAR via N[facility]A. [facility]    Changes made to PTA medication list:  Added: None  Deleted: None  Changed: sertraline 75mg > 100mg    Allergies reviewed with patient and updates made in EHR:  NKA per facility sheet    Medication History Completed By: Shadia Zhou [facility] 12/26/2024 7:49 PM    PTA Med List   Medication Sig Last Dose/Taking    acetaminophen (TYLENOL) 325 MG tablet Take 650 mg by mouth every 4 hours as needed for mild pain as needed for pain/fever Max dose 3000mg/24hr Taking As Needed    allopurinol (ZYLOPRIM) 300 MG tablet Take 300 mg by mouth every morning 0900 Taking    aspirin (ASA) 81 MG EC tablet Take 1 tablet (81 mg) by mouth daily Taking    atorvastatin (LIPITOR) 10 MG tablet Take 10 mg by mouth every morning 0900 Taking    bisacodyl (DULCOLAX) 10 MG suppository Place 10 mg rectally three times a week. Mon/Wed/Fri Taking    formoterol (PERFOROMIST) 20 MCG/2ML neb solution Take 20 mcg by nebulization every 12 hours 1000, 2300 Taking    furosemide (LASIX) 20 MG tablet Take 20 mg by mouth daily 0900 Taking    hypromellose (ARTIFICIAL TEARS) 0.5 % SOLN ophthalmic solution Place 2 drops into both eyes 2 times daily 0800, 2000 Taking    hypromellose (ARTIFICIAL TEARS) 0.5 % SOLN ophthalmic solution Place 2 drops into both eyes 2 times daily as needed for dry eyes Taking As Needed    ipratropium - albuterol 0.5 mg/2.5 mg/3 mL (DUONEB) 0.5-2.5 (3) MG/3ML neb solution Take 1 vial by nebulization 3 times daily as needed for shortness of breath, wheezing or cough. Taking As Needed    ipratropium-albuterol (COMBIVENT RESPIMAT)  MCG/ACT inhaler Inhale 1 puff into the lungs 4 times daily 0900, 1200 ,1600 ,2000 Taking    loperamide (IMODIUM) 2 MG  capsule Take 2 mg by mouth every 6 hours as needed for diarrhea Taking As Needed    melatonin 3 MG tablet Take 3 mg by mouth at bedtime. May repeat dose if not effective Taking    methenamine hippurate (HIPREX) 1 g tablet Take 1 g by mouth 2 times daily 0900, 2000 Taking    metoprolol tartrate (LOPRESSOR) 25 MG tablet Take 0.5 tablets (12.5 mg) by mouth 2 times daily Taking    pantoprazole (PROTONIX) 40 MG EC tablet Take 40 mg by mouth 2 times daily 0900, 2000 Taking    senna-docusate (SENOKOT-S/PERICOLACE) 8.6-50 MG tablet Take 2 tablets by mouth 2 times daily. 0900, 2000 Taking    senna-docusate (SENOKOT-S/PERICOLACE) 8.6-50 MG tablet Take 1 tablet by mouth daily as needed for constipation Taking As Needed    sertraline (ZOLOFT) 50 MG tablet Take 100 mg by mouth daily. 0900 Taking    simethicone (MYLICON) 125 MG chewable tablet Take 125 mg by mouth 3 times daily as needed for intestinal gas Taking As Needed    Vitamin D3 (VITAMIN D, CHOLECALCIFEROL,) 25 mcg (1000 units) tablet Take 1 tablet by mouth daily 0800 Taking

## 2024-12-27 NOTE — H&P
[facility]    History and Physical - Hospitalist Service       Date of Admission:  12/26/2024    Assessment & Plan      Ron Gilmore is a 84 year old male admitted on 12/26/2024. He is admitted with sepsis likely due to CAUTI in chronic chirinos as well as possible bronchitis.     Sepsis (fever, tachycardia, UTI)  CAUTI, suspected  Chronic chirinos, POA  Possible bronchitis  UA appears affected but bladder is chronically cathed. Has suprapubic tenderness on admission   CT with possible bronchitis. Patient has dry, non-productive cough  - inpatient  - cefepime  - follow blood and urine cultures  - chirinos changed in ED    Hx of CVA with residual left sided deficits  HTN  HLD  -continue ASA, statin, lasix, metoprolol    Oropharyngeal dysphagia  - SLP consult  - modified diet    DMT2  Not on PTA meds. Glucose elevated on admission  - med dose sliding scale    BRAD  Chronic hypoxic respiratory failure on 2L O2 at baseline, 3L on admission   COPD  -does not tolerate bipap  -continue PTA nebs    BPH    Depression  Anxiety  - sertraline    GERD  - PPI    Gout  - continue allopurinol     Mediastinal and hilar adenopathy  - Consider repeat CT in 6 months          Diet: Combination Diet Regular Diet Adult; Pureed Diet (level 4); Liquidized/Moderately Thick (level 3)  DVT Prophylaxis: Pneumatic Compression Devices  Chirinos Catheter: Not present  Lines: None     Cardiac Monitoring: ACTIVE order. Indication: Tachyarrhythmias, acute (48 hours)  Code Status: No CPR- Do NOT Intubate    Clinically Significant Risk Factors Present on Admission                 # Drug Induced Platelet Defect: home medication list includes an antiplatelet medication        # Anemia: based on hgb <11      # DMII: A1C = N/A within past 6 months    # Overweight: Estimated body mass index is 29.84 kg/m  as calculated from the following:    Height as of this encounter: 1.829 m (6').    Weight as of this encounter: 99.8 kg (220 lb).         #  "Financial/Environmental Concerns:    # COPD: noted on problem list        Disposition Plan     Medically Ready for Discharge: Anticipated in 2-4 Days           Everett Dyer MD  Hospitalist Service  [facility]  Securely message with [employer] (more info)  Text page via [employer] Paging/Directory     ______________________________________________________________________    Chief Complaint   fever    History is obtained from the patient, electronic health record, and emergency department physician    History of Present Illness   Ron Gilmore is a 84 year old male who has a history of prior CVA with residual left-sided deficits, dysphagia, chronic hypoxic, hypercapnic respiratory failure, urinary retention with chronic Cardona, HTN, HLD, DMT2, BPH, peripheral neuropathy, COPD, BRAD, depression, anxiety, gout, and chronic lower extremity edema along with other medical problems who was admitted on 12/26/2024 after presenting with reported fever, nonproductive cough, and shortness of breath.  He reports that he began feeling unwell approximately 12 to 24 hours prior to presentation.  Overnight he reported he felt like he was \"on fire\".  He has a chronic Cardona and does not endorse any localizing pain.  On exam he was noted to have suprapubic tenderness.    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    Unsteady gait     Urinary retention with " incomplete bladder emptying     UTI (urinary tract infection)     Vasovagal episode        Past Surgical History   Past Surgical History:   Procedure Laterality Date    APPENDECTOMY OPEN      ARTHROSCOPY SHOULDER ROTATOR CUFF REPAIR      cataracts Bilateral     CHOLECYSTECTOMY      COLONOSCOPY  1986    COLONOSCOPY N/A 5/29/2021    Procedure: COLONOSCOPY;  Surgeon: Kofi Davis MD;  Location:  GI    CYSTOSCOPY  10/19/2011    Procedure:CYSTOSCOPY; CYSTOSCOPY, BLADDER STONE REMOVAL; Surgeon:ROB SAWYER; Location: OR    CYSTOSCOPY, TRANSURETHRAL RESECTION (TUR) PROSTATE, COMBINED N/A 2/21/2018    Procedure: COMBINED CYSTOSCOPY, TRANSURETHRAL RESECTION (TUR) PROSTATE;  COMBINED CYSTOSCOPY, TRANSURETHRAL RESECTION (TUR) PROSTATE ;  Surgeon: Rob Sawyer MD;  Location:  OR    EP LOOP RECORDER IMPLANT N/A 1/20/2020    Procedure: EP Loop Recorder Implant;  Surgeon: Evgeny Parisi MD;  Location:  HEART CARDIAC CATH LAB    ESOPHAGOSCOPY, GASTROSCOPY, DUODENOSCOPY (EGD), COMBINED N/A 5/28/2021    Procedure: ESOPHAGOGASTRODUODENOSCOPY (EGD);  Surgeon: Aurora Waterman MD;  Location:  GI    ESOPHAGOSCOPY, GASTROSCOPY, DUODENOSCOPY (EGD), DILATATION, COMBINED N/A 9/24/2022    Procedure: ESOPHAGOGASTRODUODENOSCOPY, WITH DILATION;  Surgeon: Kofi Davis MD;  Location:  GI    EYE SURGERY      right lid surgery     IR IVC FILTER PLACEMENT  5/24/2021    IR NEPHROSTOMY TUBE PLACEMENT RIGHT  3/9/2021    IR URETERAL STENT PLACEMENT RIGHT  3/16/2021    JOINT REPLACEMENT Right     HIP    KNEE SURGERY Bilateral     LAMINECTOMY LUMBAR ONE LEVEL      LASER HOLMIUM LITHOTRIPSY URETER(S), INSERT STENT, COMBINED Right 4/14/2021    Procedure: CYSTOSCOPY, BLADDER STONE REMOVAL, RIGHT URETEROSCOPY, HOLMIUM LASER LITHOTRIPSY, AND RIGHT STENT REMOVAL, RIGHT RETROGRADE;  Surgeon: Rob Sawyer MD;  Location:  OR    TONSILLECTOMY         Prior to Admission Medications   Prior to Admission Medications    Prescriptions Last Dose Informant Patient Reported? Taking?   Vitamin D3 (VITAMIN D, CHOLECALCIFEROL,) 25 mcg (1000 units) tablet  Nursing Home Yes Yes   Sig: Take 1 tablet by mouth daily 0800   acetaminophen (TYLENOL) 325 MG tablet  Nursing Home Yes Yes   Sig: Take 650 mg by mouth every 4 hours as needed for mild pain as needed for pain/fever Max dose 3000mg/24hr   allopurinol (ZYLOPRIM) 300 MG tablet  Nursing Home Yes Yes   Sig: Take 300 mg by mouth every morning 0900   ammonium lactate (LAC-HYDRIN) 12 % external lotion  Nursing Home Yes No   Sig: Apply topically daily Apply a thin film to bilateral feet and legs   aspirin (ASA) 81 MG EC tablet  Nursing Home No Yes   Sig: Take 1 tablet (81 mg) by mouth daily   atorvastatin (LIPITOR) 10 MG tablet  Nursing Home Yes Yes   Sig: Take 10 mg by mouth every morning 0900   bisacodyl (DULCOLAX) 10 MG suppository  USP Yes Yes   Sig: Place 10 mg rectally three times a week. Mon/Wed/Fri   formoterol (PERFOROMIST) 20 MCG/2ML neb solution  USP Yes Yes   Sig: Take 20 mcg by nebulization every 12 hours 1000, 2300   furosemide (LASIX) 20 MG tablet  Nursing Home Yes Yes   Sig: Take 20 mg by mouth daily 0900   hypromellose (ARTIFICIAL TEARS) 0.5 % SOLN ophthalmic solution  USP Yes Yes   Sig: Place 2 drops into both eyes 2 times daily 0800, 2000   hypromellose (ARTIFICIAL TEARS) 0.5 % SOLN ophthalmic solution  USP Yes Yes   Sig: Place 2 drops into both eyes 2 times daily as needed for dry eyes   ipratropium - albuterol 0.5 mg/2.5 mg/3 mL (DUONEB) 0.5-2.5 (3) MG/3ML neb solution  USP Yes Yes   Sig: Take 1 vial by nebulization 3 times daily as needed for shortness of breath, wheezing or cough.   ipratropium-albuterol (COMBIVENT RESPIMAT)  MCG/ACT inhaler  USP Yes Yes   Sig: Inhale 1 puff into the lungs 4 times daily 0900, 1200 ,1600 ,2000   loperamide (IMODIUM) 2 MG capsule  Nursing Home Yes Yes   Sig: Take 2 mg by mouth  every 6 hours as needed for diarrhea   melatonin 3 MG tablet  Nursing Home Yes Yes   Sig: Take 3 mg by mouth at bedtime. May repeat dose if not effective   methenamine hippurate (HIPREX) 1 g tablet  Nursing Home Yes Yes   Sig: Take 1 g by mouth 2 times daily 0900, 2000   metoprolol tartrate (LOPRESSOR) 25 MG tablet  Nursing Home No Yes   Sig: Take 0.5 tablets (12.5 mg) by mouth 2 times daily   pantoprazole (PROTONIX) 40 MG EC tablet  Nursing Home Yes Yes   Sig: Take 40 mg by mouth 2 times daily 0900, 2000   senna-docusate (SENOKOT-S/PERICOLACE) 8.6-50 MG tablet  Nursing Home Yes Yes   Sig: Take 2 tablets by mouth 2 times daily. 0900, 2000   senna-docusate (SENOKOT-S/PERICOLACE) 8.6-50 MG tablet  Nursing Home Yes Yes   Sig: Take 1 tablet by mouth daily as needed for constipation   sertraline (ZOLOFT) 50 MG tablet  Nursing Home Yes Yes   Sig: Take 100 mg by mouth daily. 0900   simethicone (MYLICON) 125 MG chewable tablet  Nursing Home Yes Yes   Sig: Take 125 mg by mouth 3 times daily as needed for intestinal gas      Facility-Administered Medications: None           Physical Exam   Vital Signs: Temp: (!) 101.9  F (38.8  C) Temp src: Oral BP: 130/66 Pulse: 109   Resp: 20 SpO2: 93 % O2 Device: None (Room air) Oxygen Delivery: 4 LPM  Weight: 220 lbs 0 oz    Constitutional: Awake, alert, cooperative, no apparent distress  Respiratory: Clear to auscultation bilaterally, no crackles or wheezing  Cardiovascular: Regular rate and rhythm, normal S1 and S2, and no murmur noted  GI: Normal bowel sounds, soft, non-distended, non-tender  Skin/Integumen: No rashes, no cyanosis, no edema  : Suprapubic tenderness, Cardona with clear yellow urine.      Medical Decision Making       85 MINUTES SPENT BY ME on the date of service doing chart review, history, exam, documentation & further activities per the note.      Data   ------------------------- PAST 24 HR DATA REVIEWED -----------------------------------------------    I have  personally reviewed the following data over the past 24 hrs:    8.0  \   10.5 (L)   / 172     138 100 19.7 /  152 (H)   4.2 31 (H) 1.05 \     ALT: 15 AST: 19 AP: 77 TBILI: 0.3   ALB: 3.5 TOT PROTEIN: 7.4 LIPASE: N/A     Trop: 26 (H) BNP: 785     Procal: N/A CRP: N/A Lactic Acid: 1.5         Imaging results reviewed over the past 24 hrs:   Recent Results (from the past 24 hours)   CT Chest Pulmonary Embolism w Contrast    Narrative    EXAM: CT CHEST PULMONARY EMBOLISM W CONTRAST  LOCATION: [facility]  DATE: 12/26/2024    INDICATION: hypoxia, cough, bed bound, eval PE vs infection  COMPARISON: CT 10/11/2024  TECHNIQUE: CT chest pulmonary angiogram during arterial phase injection of IV contrast. Multiplanar reformats and MIP reconstructions were performed. Dose reduction techniques were used.   CONTRAST: 77 mL Isovue 370    FINDINGS:  ANGIOGRAM CHEST: Pulmonary arteries are normal caliber and negative for pulmonary emboli. Thoracic aorta is negative for dissection. No CT evidence of right heart strain.    LUNGS AND PLEURA: Moderate upper lobe predominant centrilobular emphysema. Bibasilar hypoventilatory changes without definite airspace consolidation, pleural effusion or pneumothorax. Central airways are patent. Lower lobe predominant bronchial wall   thickening is noted. Calcified granulomas are noted.    MEDIASTINUM/AXILLAE: Stable size of mildly enlarged mediastinal and hilar lymph nodes with representative right paratracheal lymph node measuring 1.6 cm in short axis, previously 1.2 cm (series 5 image 72). No definite new lymphadenopathy. No pericardial   effusion.    CORONARY ARTERY CALCIFICATION: Severe.    UPPER ABDOMEN: Cholecystectomy.    MUSCULOSKELETAL: No acute bony abnormality.      Impression    IMPRESSION:  1.  Possible mild bronchitis without jean airspace consolidation or pleural effusion.  2.  No pulmonary embolus.  3.  Persistent enlarged mediastinal and hilar lymph nodes  which again could be reactive but consider follow-up chest CT in 6 months to document stability/resolution.

## 2024-12-27 NOTE — PROGRESS NOTES
[facility]    Hospitalist Progress Note    Date of Service (when I saw the patient): 12/27/2024    Assessment & Plan   Ron Gilmore is a 84 year old male admitted on 12/26/2024. He is admitted with sepsis likely due to CAUTI in chronic chirinos as well as possible bronchitis.      Sepsis (fever, tachycardia, UTI)  CAUTI, suspected  Chronic chirinos, POA  Possible bronchitis  UA appears infeccted but bladder is chronically cathed. Has suprapubic tenderness on admission   CT with possible bronchitis. Patient has dry, non-productive cough  UCX shows GNB.  - Cont cefepime pending cx (prior pseudomonas).   - follow blood and urine cultures  - chirinos changed in ED     Hx of CVA with residual left sided deficits  HTN  HLD  -continue ASA, statin, lasix, metoprolol     Oropharyngeal dysphagia  - SLP consult  - modified diet     DMT2  Not on PTA meds. Glucose elevated on admission  - med dose sliding scale     BRAD  Chronic hypoxic respiratory failure on 2L O2 at baseline, 3L on admission   COPD  -does not tolerate bipap  -continue PTA nebs     BPH     Depression  Anxiety  - sertraline     GERD  - PPI     Gout  - continue allopurinol      Mediastinal and hilar adenopathy  - Consider repeat CT in 6 months       Diet: Combination Diet Regular Diet Adult; Pureed Diet (level 4); Liquidized/Moderately Thick (level 3)  DVT Prophylaxis: Pneumatic Compression Devices  Chirinos Catheter: Not present  Lines: None     Cardiac Monitoring: ACTIVE order. Indication: Tachyarrhythmias, acute (48 hours)  Code Status: No CPR- Do NOT Intubate       Disposition Plan     Medically Ready for Discharge: Anticipated in 2-4 Days  Culture and sens.  Comes from intermediate.      Laura Singh MD    Interval History   No complaints this am.  Awake and oriented x3.      Physical Exam   Temp: (!) 101.8  F (38.8  C) Temp src: Oral BP: 123/68 Pulse: 107   Resp: 20 SpO2: 90 % O2 Device: Nasal cannula Oxygen Delivery: 4 LPM  Vitals:    12/26/24 1530  12/27/24 0619   Weight: 99.8 kg (220 lb) 104 kg (229 lb 4.5 oz)     Vital Signs with Ranges  Temp:  [99.2  F (37.3  C)-101.9  F (38.8  C)] 101.8  F (38.8  C)  Pulse:  [] 107  Resp:  [12-26] 20  BP: (103-130)/(43-80) 123/68  SpO2:  [90 %-95 %] 90 %  I/O last 3 completed shifts:  In: -   Out: 700 [Urine:700]    Constitutional: Awake, alert, cooperative, no apparent distress  Respiratory: Clear to auscultation bilaterally, no crackles or wheezing  Cardiovascular: Regular rate and rhythm, normal S1 and S2, and no murmur noted  GI: Normal bowel sounds, soft, non-distended, non-tender  Skin/Integumen: No rashes, no cyanosis, no edema  : Suprapubic tenderness, Cardona with clear yellow urine.    LABS/MEDS:

## 2024-12-28 ENCOUNTER — APPOINTMENT (OUTPATIENT)
Dept: GENERAL RADIOLOGY | Facility: CLINIC | Age: 84
DRG: 698 | End: 2024-12-28
Attending: HOSPITALIST
Payer: MEDICARE

## 2024-12-28 LAB
ANION GAP SERPL CALCULATED.3IONS-SCNC: 7 MMOL/L (ref 7–15)
BASE EXCESS BLDV CALC-SCNC: 3.2 MMOL/L (ref -3–3)
BUN SERPL-MCNC: 23.6 MG/DL (ref 8–23)
CALCIUM SERPL-MCNC: 8.1 MG/DL (ref 8.8–10.4)
CHLORIDE SERPL-SCNC: 106 MMOL/L (ref 98–107)
CREAT SERPL-MCNC: 1.03 MG/DL (ref 0.67–1.17)
EGFRCR SERPLBLD CKD-EPI 2021: 73 ML/MIN/1.73M2
ERYTHROCYTE [DISTWIDTH] IN BLOOD BY AUTOMATED COUNT: 20.1 % (ref 10–15)
GLUCOSE BLDC GLUCOMTR-MCNC: 143 MG/DL (ref 70–99)
GLUCOSE BLDC GLUCOMTR-MCNC: 154 MG/DL (ref 70–99)
GLUCOSE BLDC GLUCOMTR-MCNC: 177 MG/DL (ref 70–99)
GLUCOSE BLDC GLUCOMTR-MCNC: 94 MG/DL (ref 70–99)
GLUCOSE BLDC GLUCOMTR-MCNC: 97 MG/DL (ref 70–99)
GLUCOSE SERPL-MCNC: 88 MG/DL (ref 70–99)
HCO3 BLDV-SCNC: 30 MMOL/L (ref 21–28)
HCO3 SERPL-SCNC: 26 MMOL/L (ref 22–29)
HCT VFR BLD AUTO: 34.8 % (ref 40–53)
HGB BLD-MCNC: 10 G/DL (ref 13.3–17.7)
MCH RBC QN AUTO: 24.9 PG (ref 26.5–33)
MCHC RBC AUTO-ENTMCNC: 28.7 G/DL (ref 31.5–36.5)
MCV RBC AUTO: 87 FL (ref 78–100)
O2/TOTAL GAS SETTING VFR VENT: 4 %
OXYHGB MFR BLDV: 77 % (ref 70–75)
PCO2 BLDV: 53 MM HG (ref 40–50)
PH BLDV: 7.36 [PH] (ref 7.32–7.43)
PLATELET # BLD AUTO: 119 10E3/UL (ref 150–450)
PO2 BLDV: 45 MM HG (ref 25–47)
POTASSIUM SERPL-SCNC: 4.4 MMOL/L (ref 3.4–5.3)
RBC # BLD AUTO: 4.02 10E6/UL (ref 4.4–5.9)
SAO2 % BLDV: 78.9 % (ref 70–75)
SODIUM SERPL-SCNC: 139 MMOL/L (ref 135–145)
WBC # BLD AUTO: 5 10E3/UL (ref 4–11)

## 2024-12-28 PROCEDURE — 250N000011 HC RX IP 250 OP 636: Performed by: STUDENT IN AN ORGANIZED HEALTH CARE EDUCATION/TRAINING PROGRAM

## 2024-12-28 PROCEDURE — 82310 ASSAY OF CALCIUM: CPT | Performed by: HOSPITALIST

## 2024-12-28 PROCEDURE — 71045 X-RAY EXAM CHEST 1 VIEW: CPT

## 2024-12-28 PROCEDURE — 36415 COLL VENOUS BLD VENIPUNCTURE: CPT | Performed by: HOSPITALIST

## 2024-12-28 PROCEDURE — 999N000157 HC STATISTIC RCP TIME EA 10 MIN

## 2024-12-28 PROCEDURE — 999N000111 HC STATISTIC OT IP EVAL DEFER

## 2024-12-28 PROCEDURE — 80048 BASIC METABOLIC PNL TOTAL CA: CPT | Performed by: HOSPITALIST

## 2024-12-28 PROCEDURE — 99232 SBSQ HOSP IP/OBS MODERATE 35: CPT | Performed by: HOSPITALIST

## 2024-12-28 PROCEDURE — 250N000013 HC RX MED GY IP 250 OP 250 PS 637: Performed by: STUDENT IN AN ORGANIZED HEALTH CARE EDUCATION/TRAINING PROGRAM

## 2024-12-28 PROCEDURE — 120N000001 HC R&B MED SURG/OB

## 2024-12-28 PROCEDURE — 999N000147 HC STATISTIC PT IP EVAL DEFER

## 2024-12-28 PROCEDURE — 82805 BLOOD GASES W/O2 SATURATION: CPT | Performed by: HOSPITALIST

## 2024-12-28 PROCEDURE — 94640 AIRWAY INHALATION TREATMENT: CPT

## 2024-12-28 PROCEDURE — 85027 COMPLETE CBC AUTOMATED: CPT | Performed by: HOSPITALIST

## 2024-12-28 PROCEDURE — 250N000012 HC RX MED GY IP 250 OP 636 PS 637: Performed by: HOSPITALIST

## 2024-12-28 PROCEDURE — 250N000009 HC RX 250: Performed by: STUDENT IN AN ORGANIZED HEALTH CARE EDUCATION/TRAINING PROGRAM

## 2024-12-28 PROCEDURE — 250N000013 HC RX MED GY IP 250 OP 250 PS 637: Performed by: HOSPITALIST

## 2024-12-28 RX ORDER — AZITHROMYCIN 250 MG/1
500 TABLET, FILM COATED ORAL DAILY
Status: COMPLETED | OUTPATIENT
Start: 2024-12-28 | End: 2024-12-30

## 2024-12-28 RX ORDER — PREDNISONE 20 MG/1
40 TABLET ORAL DAILY
Status: COMPLETED | OUTPATIENT
Start: 2024-12-28 | End: 2025-01-01

## 2024-12-28 RX ADMIN — PANTOPRAZOLE SODIUM 40 MG: 40 TABLET, DELAYED RELEASE ORAL at 08:17

## 2024-12-28 RX ADMIN — CEFEPIME 2 G: 2 INJECTION, POWDER, FOR SOLUTION INTRAVENOUS at 20:13

## 2024-12-28 RX ADMIN — PANTOPRAZOLE SODIUM 40 MG: 40 TABLET, DELAYED RELEASE ORAL at 20:14

## 2024-12-28 RX ADMIN — ALLOPURINOL 300 MG: 300 TABLET ORAL at 08:17

## 2024-12-28 RX ADMIN — IPRATROPIUM BROMIDE AND ALBUTEROL 1 PUFF: 20; 100 SPRAY, METERED RESPIRATORY (INHALATION) at 08:18

## 2024-12-28 RX ADMIN — METHENAMINE HIPPURATE 1 G: 1 TABLET ORAL at 20:13

## 2024-12-28 RX ADMIN — FORMOTEROL FUMARATE DIHYDRATE 20 MCG: 20 SOLUTION RESPIRATORY (INHALATION) at 08:39

## 2024-12-28 RX ADMIN — ACETAMINOPHEN 650 MG: 325 TABLET, FILM COATED ORAL at 00:05

## 2024-12-28 RX ADMIN — CEFEPIME 2 G: 2 INJECTION, POWDER, FOR SOLUTION INTRAVENOUS at 10:00

## 2024-12-28 RX ADMIN — ASPIRIN 81 MG: 81 TABLET, COATED ORAL at 08:18

## 2024-12-28 RX ADMIN — SERTRALINE HYDROCHLORIDE 100 MG: 100 TABLET ORAL at 08:17

## 2024-12-28 RX ADMIN — CEFEPIME 2 G: 2 INJECTION, POWDER, FOR SOLUTION INTRAVENOUS at 02:55

## 2024-12-28 RX ADMIN — DEXTRAN 70, GLYCERIN, HYPROMELLOSE 2 DROP: 1; 2; 3 SOLUTION/ DROPS OPHTHALMIC at 20:13

## 2024-12-28 RX ADMIN — DEXTRAN 70, GLYCERIN, HYPROMELLOSE 2 DROP: 1; 2; 3 SOLUTION/ DROPS OPHTHALMIC at 08:18

## 2024-12-28 RX ADMIN — METHENAMINE HIPPURATE 1 G: 1 TABLET ORAL at 08:18

## 2024-12-28 RX ADMIN — IPRATROPIUM BROMIDE AND ALBUTEROL 1 PUFF: 20; 100 SPRAY, METERED RESPIRATORY (INHALATION) at 18:09

## 2024-12-28 RX ADMIN — SENNOSIDES AND DOCUSATE SODIUM 2 TABLET: 50; 8.6 TABLET ORAL at 08:17

## 2024-12-28 RX ADMIN — IPRATROPIUM BROMIDE AND ALBUTEROL 1 PUFF: 20; 100 SPRAY, METERED RESPIRATORY (INHALATION) at 13:23

## 2024-12-28 RX ADMIN — ATORVASTATIN CALCIUM 10 MG: 10 TABLET, FILM COATED ORAL at 08:18

## 2024-12-28 RX ADMIN — METOPROLOL TARTRATE 12.5 MG: 25 TABLET, FILM COATED ORAL at 08:17

## 2024-12-28 RX ADMIN — PREDNISONE 40 MG: 20 TABLET ORAL at 09:56

## 2024-12-28 RX ADMIN — METOPROLOL TARTRATE 12.5 MG: 25 TABLET, FILM COATED ORAL at 20:21

## 2024-12-28 RX ADMIN — FUROSEMIDE 20 MG: 20 TABLET ORAL at 08:18

## 2024-12-28 RX ADMIN — AZITHROMYCIN DIHYDRATE 500 MG: 250 TABLET ORAL at 18:08

## 2024-12-28 RX ADMIN — ACETAMINOPHEN 650 MG: 325 TABLET, FILM COATED ORAL at 12:17

## 2024-12-28 ASSESSMENT — ACTIVITIES OF DAILY LIVING (ADL)
ADLS_ACUITY_SCORE: 76
ADLS_ACUITY_SCORE: 78
ADLS_ACUITY_SCORE: 76
ADLS_ACUITY_SCORE: 78
ADLS_ACUITY_SCORE: 82
ADLS_ACUITY_SCORE: 74
ADLS_ACUITY_SCORE: 82
ADLS_ACUITY_SCORE: 74
ADLS_ACUITY_SCORE: 76
ADLS_ACUITY_SCORE: 74
ADLS_ACUITY_SCORE: 76
ADLS_ACUITY_SCORE: 76
ADLS_ACUITY_SCORE: 78
ADLS_ACUITY_SCORE: 78
ADLS_ACUITY_SCORE: 80
ADLS_ACUITY_SCORE: 76
ADLS_ACUITY_SCORE: 78
ADLS_ACUITY_SCORE: 78
ADLS_ACUITY_SCORE: 82
ADLS_ACUITY_SCORE: 78
ADLS_ACUITY_SCORE: 82
ADLS_ACUITY_SCORE: 78
ADLS_ACUITY_SCORE: 74

## 2024-12-28 NOTE — PROGRESS NOTES
MD Notification    Notified Person: MD    Notified Person Name: Rg Veloz    Notification Date/Time: 12/28/24 0135    Notification Interaction: Vocera massage    Purpose of Notification: FYI: Patient had increase temperature between 101- 100.6, PRN Tylenol given x2 since starting my shift in 1900    Orders Received:    Comments: Will continue to monitor

## 2024-12-28 NOTE — PLAN OF CARE
Goal Outcome Evaluation:  Summary:  UTI, acute on chronic respiratory failure  DATE & TIME: 11/28/24 3665-2353  Cognitive Concerns/ Orientation : A&Ox4, can be forgetful at times   BEHAVIOR & AGGRESSION TOOL COLOR: Green  CIWA SCORE: NA   ABNL VS/O2: VSS on 4L TMAX 101.6 - gave PRN tylenol  MOBILITY: A2 T&R  PAIN MANAGMENT: denies pain, given tylenol for fever  DIET: Pureed diet, moderately thick liquids-poor appetite. Takes pills whole with applesauce.   BOWEL/BLADDER: Chronic chirinos. BM this shift  ABNL LAB/BG:   DRAIN/DEVICES: R PIV SL  TELEMETRY RHYTHM: NSR  SKIN: Peeling to sacrum/coccyx mepi in place, redness to scrotum, and scattered bruising. Peeling heels.  TESTS/PROCEDURES: SLP consult requested - never ordered or completed.  D/C DAY/GOALS/PLACE: Pending improvement. From [facility].  OTHER IMPORTANT INFO: LS coarse, wheezing. Educated patient on importance of coughing. IS encouraged. ID consult placed regarding abx and persistent fever.

## 2024-12-28 NOTE — PLAN OF CARE
Physical Therapy: Orders received. Chart reviewed and discussed with care team.? Physical Therapy not indicated per discussion with OT, pt receives total A for all cares at his [facility] at baseline. Per chart in October 2024, pt mostly bed bound, but has an electric scooter, they aleks lift him to. Pt has no acute skilled needs. Nursing staff to address pt's IP mobility needs at this time.? Defer discharge recommendations to medical team.? Will complete orders.

## 2024-12-28 NOTE — PLAN OF CARE
Occupational Therapy: Orders received. Chart reviewed and discussed with care team.? Occupational Therapy not indicated. Spoke with SW and per chart, pt from [facility] at baseline and has total assist with all I/ADLs and aleks lift for transfers at baseline. No acute OT needs identified. Defer discharge recommendations to Care Team.? Will complete orders.

## 2024-12-28 NOTE — PROGRESS NOTES
[facility]    Hospitalist Progress Note    Date of Service (when I saw the patient): 12/28/2024    Assessment & Plan   Ron Gilmore is a 84 year old male admitted on 12/26/2024. He is admitted with sepsis likely due to CAUTI in chronic chirinos as well as possible bronchitis.      Sepsis (fever, tachycardia, UTI)  CAUTI, suspected  Chronic chirinos, POA  Possible bronchitis  UA appears infeccted but bladder is chronically cathed. Has suprapubic tenderness on admission   CT with possible bronchitis. Patient has dry, non-productive cough  UCX shows pseudomonas and enterococcus.sens pending.  BCX NTD.  Continue to be febrile.  - Cont cefepime pending sens. Moght need coverage for enterococcus.  - follow blood and urine cultures  - chirinos changed in ED  - Consult ID for persistent fever and abx plan.     Hx of CVA with residual left sided deficits  HTN  HLD  -continue ASA, statin, lasix, metoprolol     Oropharyngeal dysphagia  - SLP consult  - modified diet     DMT2  Not on PTA meds. Glucose elevated on admission  - med dose sliding scale     BRAD  Chronic hypoxic respiratory failure on 2L O2 at baseline, 3L on admission   COPD  Mild increase on sob.  Wheezing on exam.  CXR on 12/28 shows atalectasis vs PNA. CO2 at baseline on VBG..  - Add azithromycin x 3 days  - Add Prednisone burst given wheezing.  - Does not tolerate bipap  - Continue PTA nebs    Hold IVF for now.     BPH     Depression  Anxiety  - sertraline     GERD  - PPI     Gout  - continue allopurinol      Mediastinal and hilar adenopathy  - Consider repeat CT in 6 months       Diet: Combination Diet Regular Diet Adult; Pureed Diet (level 4); Liquidized/Moderately Thick (level 3)  DVT Prophylaxis: Pneumatic Compression Devices  Chirinos Catheter: Not present  Lines: None     Cardiac Monitoring: ACTIVE order. Indication: Tachyarrhythmias, acute (48 hours)  Code Status: No CPR- Do NOT Intubate       Disposition Plan     Medically Ready for  Discharge: Anticipated in 2-4 Days  Culture and sens.  Comes from [facility].      Laura Singh MD    Interval History   Mildly increased sob this am.  Awake and oriented x3.      Physical Exam   Temp: 100.1  F (37.8  C) Temp src: Oral BP: (!) 144/78 Pulse: 97   Resp: 18 SpO2: 93 % O2 Device: Nasal cannula Oxygen Delivery: 4 LPM  Vitals:    12/26/24 1530 12/27/24 0619   Weight: 99.8 kg (220 lb) 104 kg (229 lb 4.5 oz)     Vital Signs with Ranges  Temp:  [98.6  F (37  C)-102.2  F (39  C)] 100.1  F (37.8  C)  Pulse:  [67-98] 97  Resp:  [18-20] 18  BP: (113-144)/(55-78) 144/78  SpO2:  [92 %-99 %] 93 %  I/O last 3 completed shifts:  In: 1391 [P.O.:460; I.V.:931]  Out: 1200 [Urine:1200]    Constitutional: Awake, alert, cooperative, no apparent distress  Respiratory: Clear to auscultation bilaterally, no crackles or wheezing  Cardiovascular: Regular rate and rhythm, normal S1 and S2, and no murmur noted  GI: Normal bowel sounds, soft, non-distended, non-tender  Skin/Integumen: No rashes, no cyanosis, no edema  : Suprapubic tenderness, Cardona with clear yellow urine.    LABS/MEDS:

## 2024-12-28 NOTE — PROGRESS NOTES
Date & Time: 1900-1100  Summary: Admitted for sepsis due to CAUTI in chronic chirinos as well as possible bronchitis.   Orientation/Cognitive: A&Ox4  Mobility Level/Assist Equipment: A2 with every 2 hours reposition  Fall Risk (Y/N): Yes  Behavior Concerns: Green  Pain Management: C/O minimal penis pain  Tele/VS/O2: VSS on 4L of oxygen via NC, except slightly HTN BP medication was give this morning  TELE: NSR/PVC  ABNL Lab/BG: Trops 28, 26, Hgb 10.8, [id number], Tavares 8.1, Urea 23.6. B, 97, & 94  Diet: Puree diet and liquidized/moderately  Bowel/Bladder: Incontinent of bowel. Chirinos catheter patent. No BM during the shift  Skin Concerns: Blanchable redness to buttocks/sacrum with mepilex in place. Generalized scattered bruising  Drains/Devices: R PIV infusing NS at 75 ml/hr. Chirinos catheter  Tests/Procedures for next shift: Chest X-ray order  Anticipated DC date & active delays: TBD  Other Important information: Patient has been running a temperature off and on. Had a temp of 100.5 during my initial assessment. PRN Tylenol given x2, MD is aware. PT/OT consulted. Started on prednisone 40 mg daily. BG check change to BID without 0200 checks per provider because patient is complaining of frequents checks. Stated that he didn't want to eat anything for breakfast.        BP (!) 144/78 (BP Location: Right arm, Patient Position: Supine, Cuff Size: Adult Regular)   Pulse 97   Temp 100.1  F (37.8  C) (Oral)   Resp 18   Ht 1.829 m (6')   Wt 104 kg (229 lb 4.5 oz)   SpO2 93%   BMI 31.10 kg/m

## 2024-12-28 NOTE — PROVIDER NOTIFICATION
MD Notification    Notified Person: MD    Notified Person Name: Francisco    Notification Date/Time: 12/28/24 4003    Notification Interaction: Vocera     Purpose of Notification: Hes on 5L may need some suctioning. Do you want to put in order for it just incase? RT about to come up here. I also dont see a speech consult order or completed. Can you order one?    Orders Received: Yes  And yes  Thank you  Goal says is 88-92%  COPD  On home postgame at baseline    Comments:

## 2024-12-29 LAB
ANION GAP SERPL CALCULATED.3IONS-SCNC: 6 MMOL/L (ref 7–15)
BACTERIA UR CULT: ABNORMAL
BACTERIA UR CULT: ABNORMAL
BUN SERPL-MCNC: 26.9 MG/DL (ref 8–23)
CALCIUM SERPL-MCNC: 8.7 MG/DL (ref 8.8–10.4)
CHLORIDE SERPL-SCNC: 104 MMOL/L (ref 98–107)
CREAT SERPL-MCNC: 0.97 MG/DL (ref 0.67–1.17)
EGFRCR SERPLBLD CKD-EPI 2021: 78 ML/MIN/1.73M2
GLUCOSE BLDC GLUCOMTR-MCNC: 125 MG/DL (ref 70–99)
GLUCOSE BLDC GLUCOMTR-MCNC: 143 MG/DL (ref 70–99)
GLUCOSE BLDC GLUCOMTR-MCNC: 145 MG/DL (ref 70–99)
GLUCOSE BLDC GLUCOMTR-MCNC: 190 MG/DL (ref 70–99)
GLUCOSE BLDC GLUCOMTR-MCNC: 96 MG/DL (ref 70–99)
GLUCOSE SERPL-MCNC: 112 MG/DL (ref 70–99)
HCO3 SERPL-SCNC: 30 MMOL/L (ref 22–29)
POTASSIUM SERPL-SCNC: 5 MMOL/L (ref 3.4–5.3)
SODIUM SERPL-SCNC: 140 MMOL/L (ref 135–145)

## 2024-12-29 PROCEDURE — 82435 ASSAY OF BLOOD CHLORIDE: CPT | Performed by: HOSPITALIST

## 2024-12-29 PROCEDURE — 250N000013 HC RX MED GY IP 250 OP 250 PS 637: Performed by: HOSPITALIST

## 2024-12-29 PROCEDURE — 250N000009 HC RX 250: Performed by: STUDENT IN AN ORGANIZED HEALTH CARE EDUCATION/TRAINING PROGRAM

## 2024-12-29 PROCEDURE — 80048 BASIC METABOLIC PNL TOTAL CA: CPT | Performed by: HOSPITALIST

## 2024-12-29 PROCEDURE — 36415 COLL VENOUS BLD VENIPUNCTURE: CPT | Performed by: HOSPITALIST

## 2024-12-29 PROCEDURE — 94640 AIRWAY INHALATION TREATMENT: CPT

## 2024-12-29 PROCEDURE — 99232 SBSQ HOSP IP/OBS MODERATE 35: CPT | Performed by: HOSPITALIST

## 2024-12-29 PROCEDURE — 94640 AIRWAY INHALATION TREATMENT: CPT | Mod: 76

## 2024-12-29 PROCEDURE — 250N000011 HC RX IP 250 OP 636: Performed by: INTERNAL MEDICINE

## 2024-12-29 PROCEDURE — 999N000157 HC STATISTIC RCP TIME EA 10 MIN

## 2024-12-29 PROCEDURE — 250N000013 HC RX MED GY IP 250 OP 250 PS 637: Performed by: STUDENT IN AN ORGANIZED HEALTH CARE EDUCATION/TRAINING PROGRAM

## 2024-12-29 PROCEDURE — 250N000012 HC RX MED GY IP 250 OP 636 PS 637: Performed by: HOSPITALIST

## 2024-12-29 PROCEDURE — 120N000001 HC R&B MED SURG/OB

## 2024-12-29 PROCEDURE — 250N000011 HC RX IP 250 OP 636: Performed by: STUDENT IN AN ORGANIZED HEALTH CARE EDUCATION/TRAINING PROGRAM

## 2024-12-29 RX ORDER — PIPERACILLIN SODIUM, TAZOBACTAM SODIUM 4; .5 G/20ML; G/20ML
4.5 INJECTION, POWDER, LYOPHILIZED, FOR SOLUTION INTRAVENOUS EVERY 6 HOURS
Status: DISCONTINUED | OUTPATIENT
Start: 2024-12-29 | End: 2025-01-02

## 2024-12-29 RX ADMIN — ASPIRIN 81 MG: 81 TABLET, COATED ORAL at 08:23

## 2024-12-29 RX ADMIN — PIPERACILLIN AND TAZOBACTAM 4.5 G: 4; .5 INJECTION, POWDER, FOR SOLUTION INTRAVENOUS at 16:48

## 2024-12-29 RX ADMIN — PANTOPRAZOLE SODIUM 40 MG: 40 TABLET, DELAYED RELEASE ORAL at 21:08

## 2024-12-29 RX ADMIN — PIPERACILLIN AND TAZOBACTAM 4.5 G: 4; .5 INJECTION, POWDER, FOR SOLUTION INTRAVENOUS at 11:04

## 2024-12-29 RX ADMIN — PANTOPRAZOLE SODIUM 40 MG: 40 TABLET, DELAYED RELEASE ORAL at 08:23

## 2024-12-29 RX ADMIN — IPRATROPIUM BROMIDE AND ALBUTEROL 1 PUFF: 20; 100 SPRAY, METERED RESPIRATORY (INHALATION) at 21:08

## 2024-12-29 RX ADMIN — IPRATROPIUM BROMIDE AND ALBUTEROL 1 PUFF: 20; 100 SPRAY, METERED RESPIRATORY (INHALATION) at 08:33

## 2024-12-29 RX ADMIN — DEXTRAN 70, GLYCERIN, HYPROMELLOSE 2 DROP: 1; 2; 3 SOLUTION/ DROPS OPHTHALMIC at 21:08

## 2024-12-29 RX ADMIN — FORMOTEROL FUMARATE DIHYDRATE 20 MCG: 20 SOLUTION RESPIRATORY (INHALATION) at 20:17

## 2024-12-29 RX ADMIN — METHENAMINE HIPPURATE 1 G: 1 TABLET ORAL at 08:24

## 2024-12-29 RX ADMIN — FORMOTEROL FUMARATE DIHYDRATE 20 MCG: 20 SOLUTION RESPIRATORY (INHALATION) at 08:10

## 2024-12-29 RX ADMIN — PIPERACILLIN AND TAZOBACTAM 4.5 G: 4; .5 INJECTION, POWDER, FOR SOLUTION INTRAVENOUS at 21:08

## 2024-12-29 RX ADMIN — CEFEPIME 2 G: 2 INJECTION, POWDER, FOR SOLUTION INTRAVENOUS at 04:11

## 2024-12-29 RX ADMIN — METHENAMINE HIPPURATE 1 G: 1 TABLET ORAL at 21:08

## 2024-12-29 RX ADMIN — PREDNISONE 40 MG: 20 TABLET ORAL at 08:24

## 2024-12-29 RX ADMIN — METOPROLOL TARTRATE 12.5 MG: 25 TABLET, FILM COATED ORAL at 21:08

## 2024-12-29 RX ADMIN — SENNOSIDES AND DOCUSATE SODIUM 2 TABLET: 50; 8.6 TABLET ORAL at 08:24

## 2024-12-29 RX ADMIN — SENNOSIDES AND DOCUSATE SODIUM 2 TABLET: 50; 8.6 TABLET ORAL at 21:08

## 2024-12-29 RX ADMIN — SERTRALINE HYDROCHLORIDE 100 MG: 100 TABLET ORAL at 08:24

## 2024-12-29 RX ADMIN — IPRATROPIUM BROMIDE AND ALBUTEROL 1 PUFF: 20; 100 SPRAY, METERED RESPIRATORY (INHALATION) at 13:40

## 2024-12-29 RX ADMIN — FUROSEMIDE 20 MG: 20 TABLET ORAL at 08:24

## 2024-12-29 RX ADMIN — AZITHROMYCIN DIHYDRATE 500 MG: 250 TABLET ORAL at 08:23

## 2024-12-29 RX ADMIN — METOPROLOL TARTRATE 12.5 MG: 25 TABLET, FILM COATED ORAL at 08:23

## 2024-12-29 RX ADMIN — ALLOPURINOL 300 MG: 300 TABLET ORAL at 08:24

## 2024-12-29 RX ADMIN — ATORVASTATIN CALCIUM 10 MG: 10 TABLET, FILM COATED ORAL at 08:23

## 2024-12-29 ASSESSMENT — ACTIVITIES OF DAILY LIVING (ADL)
ADLS_ACUITY_SCORE: 78
ADLS_ACUITY_SCORE: 82
ADLS_ACUITY_SCORE: 82
ADLS_ACUITY_SCORE: 78
ADLS_ACUITY_SCORE: 82
ADLS_ACUITY_SCORE: 82
ADLS_ACUITY_SCORE: 78
ADLS_ACUITY_SCORE: 78
ADLS_ACUITY_SCORE: 82
ADLS_ACUITY_SCORE: 82
ADLS_ACUITY_SCORE: 78
ADLS_ACUITY_SCORE: 82
ADLS_ACUITY_SCORE: 78
ADLS_ACUITY_SCORE: 82
ADLS_ACUITY_SCORE: 82
ADLS_ACUITY_SCORE: 78
ADLS_ACUITY_SCORE: 82
ADLS_ACUITY_SCORE: 78
ADLS_ACUITY_SCORE: 82

## 2024-12-29 NOTE — PROVIDER NOTIFICATION
MD Notification    Notified Person: MD    Notified Person Name: Francisco     Notification Date/Time: 12/29/2024, 1305     Notification Interaction: [employer] messaging     Purpose of Notification: Hi Dr. Singh. I have not seen speech come by yet. I am not seeing the consult in the orders? Thanks!    Orders Received:  Please order    Comments:  -order placed with help of charge.

## 2024-12-29 NOTE — PLAN OF CARE
DATE & TIME: 11/29/24 2642-3340  Cognitive Concerns/ Orientation : A&O x 4, forgetful  BEHAVIOR & AGGRESSION TOOL COLOR: Green  CIWA SCORE: NA   ABNL VS/O2: VSS, afebrile this shift. On 2L O2 via NC (baseline).   MOBILITY: Ax2, lift. Turns Q2h. L sided weakness from previous CVA.   PAIN MANAGMENT: Denies   DIET: Pureed diet, moderately thick liquids. Pills whole and 1 at a time with apple sauce.   BOWEL/BLADDER: Chronic chirinos. No BM this shift.   ABNL LAB/BG:   DRAIN/DEVICES: R PIV SL with intermittent abx   TELEMETRY RHYTHM: NSR with BBB  SKIN: Peeling to sacrum/coccyx mepi in place, redness to scrotum, and scattered bruising. Peeling heels. Moist and clammy skin.   TESTS/PROCEDURES: SLP consult requested - never ordered or completed.  D/C DAY/GOALS/PLACE: Pending improvement. From [facility].  OTHER IMPORTANT INFO: LS coarse, infrequent congested cough.Scheduled nebs and inhalers.   IS encouraged. ID consult. Contact precautions maintained.

## 2024-12-29 NOTE — PLAN OF CARE
Goal Outcome Evaluation:      Plan of Care Reviewed With: patient    Summary:  Sepsis likely due to CAUTI in chronic chirinos as well as possible bronchitis   DATE & TIME: 11/29/24, [id number]  Cognitive Concerns/ Orientation : A&O x 3-4, forgetful, very pleasant   BEHAVIOR & AGGRESSION TOOL COLOR: Green  CIWA SCORE: NA   ABNL VS/O2: VSS, afebrile this shift. On 2L O2 via NC (baseline), sats low 90s.   MOBILITY: Ax2, lift. Turns Q2h. L sided weakness from previous CVA.   PAIN MANAGMENT: Denies   DIET: Pureed diet, moderately thick liquids. Poor appetite due to diet. Pills whole and 1 at a time with apple sauce. Swallowing well for nursing, speech consulted to advance diet.   BOWEL/BLADDER: Chronic chirinos, good UOP. x 2 BM this shift.   ABNL LAB/BG: BG 96, 143. Urea 26.9, calcium 8.7   DRAIN/DEVICES: R PIV SL with intermittent abx   TELEMETRY RHYTHM: NSR  SKIN: Peeling to sacrum/coccyx mepilex in place, redness to scrotum, and scattered bruising. Peeling heels. Moist and clammy skin.   TESTS/PROCEDURES: NA   D/C DAY/GOALS/PLACE: Pending improvement. From [facility].  OTHER IMPORTANT INFO: LS coarse, infrequent congested cough.Scheduled nebs and inhalers. IS encouraged.Contact precautions maintained for VRE. ID consult today- plan to start IV zosyn for now with oral conversion on discharge.

## 2024-12-29 NOTE — PLAN OF CARE
"Summary:  UTI, acute on chronic respiratory failure    DATE & TIME: 11/28/24 Evenings   Cognitive Concerns/ Orientation : A&Ox2-3, [place]. Pt asking \"where he was\" and if \"he was still alive.\" Calm and cooperative.   BEHAVIOR & AGGRESSION TOOL COLOR: Green  CIWA SCORE: NA   ABNL VS/O2: VSS, afebrile this shift. On 4L O2 via NC. Coarse and congested. Scheduled nebs and inhalers.   MOBILITY: 2PA, lift. Turns Q2h. L sided weakness for previous CVA.   PAIN MANAGMENT: Denies this shift   DIET: Pureed diet, moderately thick liquids. Assisted with ordering and feeding. Ate 75% this evening. Pills whole and 1 at a time with apple sauce.   BOWEL/BLADDER: Chronic chirinos. No BM this shift.   ABNL LAB/BG: , 177. Urea 23.6, Hgb 10.0  DRAIN/DEVICES: R PIV SL with intermittent abx   TELEMETRY RHYTHM: NSR  SKIN: Peeling to sacrum/coccyx mepi in place, redness to scrotum, and scattered bruising. Peeling heels. Moist and clammy skin.   TESTS/PROCEDURES: SLP consult requested - never ordered or completed.  D/C DAY/GOALS/PLACE: Pending improvement. From [facility].  OTHER IMPORTANT INFO: LS coarse, wheezing. Educated patient on importance of coughing. IS encouraged. ID consult placed regarding abx and recent fever. Contact precautions maintained. Bed alarm on for safety. Rounded on freq.                       "

## 2024-12-29 NOTE — PROGRESS NOTES
[facility]    Hospitalist Progress Note    Date of Service (when I saw the patient): 12/29/2024    Assessment & Plan   Ron Gilmore is a 84 year old male admitted on 12/26/2024. He is admitted with sepsis likely due to CAUTI in chronic chirinos as well as possible bronchitis.      Sepsis (fever, tachycardia, UTI)  CAUTI, suspected  Chronic chirinos, POA  Possible bronchitis  UA appears infeccted but bladder is chronically cathed. Has suprapubic tenderness on admission   CT with possible bronchitis. Patient has dry, non-productive cough  UCX shows pseudomonas and enterococcus.  BCX NTD.  Afebrile since yesterday at around noon.  - Cont cefepime for now,  - follow blood and urine cultures  - chirinos changed in ED  - Consulted ID for abx plan.     Hx of CVA with residual left sided deficits  HTN  HLD  -continue ASA, statin, lasix, metoprolol     Oropharyngeal dysphagia  - Diet per SLP.       DMT2  Not on PTA meds. Glucose elevated on admission  - med dose sliding scale     BRAD  Chronic hypoxic respiratory failure on 2L O2 at baseline, 3L on admission   COPD with acute exacerbation  Sob improved to  baseline  Requiring less oxygen (back to 2 L nc--at baseline).  Wheezing resolved.  CXR on 12/28 shows atalectasis vs PNA. CO2 at baseline on VBG..  - Cont azithromycin x 3 days  - Cont Prednisone burst .  - Does not tolerate bipap  - Continue PTA nebs       BPH  Depression  Anxiety  - resume PTA meds.     GERD  - PPI     Gout  - continue allopurinol      Mediastinal and hilar adenopathy  - Consider repeat CT in 6 months       Diet: Combination Diet Regular Diet Adult; Pureed Diet (level 4); Liquidized/Moderately Thick (level 3)  DVT Prophylaxis: Pneumatic Compression Devices  Chirinos Catheter: Not present  Lines: None     Cardiac Monitoring: ACTIVE order. Indication: Tachyarrhythmias, acute (48 hours)  Code Status: No CPR- Do NOT Intubate       Disposition Plan  Medically Ready for Discharge: Anticipated  in 2-4 Days  Likely back to [facility] tomorrow pending abx plan. Provided that there are no new issues.        Awil JUAQUIN Singh MD    Interval History   Breathing better.  No cp.  Awake and oriented x3.      Physical Exam   Temp: 97.5  F (36.4  C) Temp src: Oral BP: (!) 143/77 Pulse: 76   Resp: 18 SpO2: 96 % O2 Device: Nasal cannula Oxygen Delivery: 2 LPM  Vitals:    12/26/24 1530 12/27/24 0619   Weight: 99.8 kg (220 lb) 104 kg (229 lb 4.5 oz)     Vital Signs with Ranges  Temp:  [97.2  F (36.2  C)-101.6  F (38.7  C)] 97.5  F (36.4  C)  Pulse:  [70-90] 76  Resp:  [18-20] 18  BP: (117-143)/(68-78) 143/77  SpO2:  [93 %-99 %] 96 %  I/O last 3 completed shifts:  In: 360 [P.O.:360]  Out: 1575 [Urine:1575]    Constitutional: Awake, alert, cooperative, no apparent distress  Respiratory: Clear to auscultation bilaterally, no crackles or wheezing  Cardiovascular: Regular rate and rhythm, normal S1 and S2, and no murmur noted  GI: Normal bowel sounds, soft, non-distended, non-tender  Skin/Integumen: No rashes, no cyanosis, no edema  : Suprapubic tenderness, Cardona with clear yellow urine.    LABS/MEDS:

## 2024-12-29 NOTE — CONSULTS
[facility]  INFECTIOUS DISEASES CONSULTATION  Ron Gilmore 12/29/2024    History  85 yo man with history of CVA and L sided residual weakness, dysphagia, chronic hypoxia and respiratory failure, urinary retention with chirinos, HTN, DM, COPD, BRAD, neuropathy, gout, anxiety/depression, chronic LE edema  Presented to hospital on 12/26/24 with dry cough, fever and SOB.   He had felt worse for 1 day PTA  In the ED, his temp was 38.8 deg C  CT PE study showed no PE and no obvious new infiltrate but some evidence of bronchitis.  WBC was 8.0  UA had 52 WBC, UC has grown ,000 Pseudomonas and Enterococcus faecalis  Was started on iv cefepime  He had fever through yesterday noon but non since  Remains a little confused he admits but better  Some cough and his oxygen need has come down to baseline     ROS: 10 point ROS neg other than the symptoms noted above in the HPI.    Past Medical History:   Diagnosis Date    BPH (benign prostatic hyperplasia)     Cataract     Cholelithiasis     COPD (chronic obstructive pulmonary disease) (H)     Depression     Diabetes mellitus     Type 2    Dyshidrotic foot dermatitis     Edema     Gout     Hyperlipidemia     Hypertension     Infection due to 2019 novel coronavirus 5/1/2021    Kidney stones     Bladder Stones    Lumbago     Lumbar disc displacement without myelopathy     Muscle weakness     Neuropathy, diabetic (H)     Obesity     Spinal stenosis     Stroke (H)     with residual effects- weakness LUE> LLE    Unsteady gait     Urinary retention with incomplete bladder emptying     UTI (urinary tract infection)     Vasovagal episode      Past Surgical History:   Procedure Laterality Date    APPENDECTOMY OPEN      ARTHROSCOPY SHOULDER ROTATOR CUFF REPAIR      cataracts Bilateral     CHOLECYSTECTOMY      COLONOSCOPY  1986    COLONOSCOPY N/A 5/29/2021    Procedure: COLONOSCOPY;  Surgeon: Kofi Davis MD;  Location:  GI    CYSTOSCOPY  10/19/2011     Procedure:CYSTOSCOPY; CYSTOSCOPY, BLADDER STONE REMOVAL; Surgeon:ROB SAWYER; Location: OR    CYSTOSCOPY, TRANSURETHRAL RESECTION (TUR) PROSTATE, COMBINED N/A 2/21/2018    Procedure: COMBINED CYSTOSCOPY, TRANSURETHRAL RESECTION (TUR) PROSTATE;  COMBINED CYSTOSCOPY, TRANSURETHRAL RESECTION (TUR) PROSTATE ;  Surgeon: Rob Sawyer MD;  Location:  OR    EP LOOP RECORDER IMPLANT N/A 1/20/2020    Procedure: EP Loop Recorder Implant;  Surgeon: Evgeny Parisi MD;  Location:  HEART CARDIAC CATH LAB    ESOPHAGOSCOPY, GASTROSCOPY, DUODENOSCOPY (EGD), COMBINED N/A 5/28/2021    Procedure: ESOPHAGOGASTRODUODENOSCOPY (EGD);  Surgeon: Aurora Waterman MD;  Location:  GI    ESOPHAGOSCOPY, GASTROSCOPY, DUODENOSCOPY (EGD), DILATATION, COMBINED N/A 9/24/2022    Procedure: ESOPHAGOGASTRODUODENOSCOPY, WITH DILATION;  Surgeon: Kofi Davis MD;  Location:  GI    EYE SURGERY      right lid surgery     IR IVC FILTER PLACEMENT  5/24/2021    IR NEPHROSTOMY TUBE PLACEMENT RIGHT  3/9/2021    IR URETERAL STENT PLACEMENT RIGHT  3/16/2021    JOINT REPLACEMENT Right     HIP    KNEE SURGERY Bilateral     LAMINECTOMY LUMBAR ONE LEVEL      LASER HOLMIUM LITHOTRIPSY URETER(S), INSERT STENT, COMBINED Right 4/14/2021    Procedure: CYSTOSCOPY, BLADDER STONE REMOVAL, RIGHT URETEROSCOPY, HOLMIUM LASER LITHOTRIPSY, AND RIGHT STENT REMOVAL, RIGHT RETROGRADE;  Surgeon: Rob Sawyer MD;  Location:  OR    TONSILLECTOMY       Social History     Social History Narrative    Not on file     Family History   Problem Relation Age of Onset    Prostate Cancer Father         No Known Allergies  Current Facility-Administered Medications   Medication Dose Route Frequency Provider Last Rate Last Admin    acetaminophen (TYLENOL) tablet 650 mg  650 mg Oral Q4H PRN Everett Dyer MD   650 mg at 12/28/24 1217    Or    acetaminophen (TYLENOL) Suppository 650 mg  650 mg Rectal Q4H PRN Everett Dyer MD        allopurinol  (ZYLOPRIM) tablet 300 mg  300 mg Oral Everett Garcia MD   300 mg at 12/28/24 0817    artificial tears (GENTEAL) 0.1-0.2-0.3 % ophthalmic solution 2 drop  2 drop Both Eyes BID Everett Dyer MD   2 drop at 12/28/24 2013    aspirin EC tablet 81 mg  81 mg Oral Daily Everett Dyer MD   81 mg at 12/28/24 0818    atorvastatin (LIPITOR) tablet 10 mg  10 mg Oral Everett Garcia MD   10 mg at 12/28/24 0818    azithromycin (ZITHROMAX) tablet 500 mg  500 mg Oral Daily Laura Singh MD   500 mg at 12/28/24 1808    bisacodyl (DULCOLAX) suppository 10 mg  10 mg Rectal Once per day on Monday Wednesday Friday Everett Dyer MD        calcium carbonate (TUMS) chewable tablet 1,000 mg  1,000 mg Oral 4x Daily PRN Everett Dyer MD        ceFEPIme (MAXIPIME) 2 g vial to attach to  mL bag for ADULTS or NS 50 mL bag for PEDS  2 g Intravenous Q8H Everett Dyer  mL/hr at 12/28/24 1000 2 g at 12/29/24 0411    glucose gel 15-30 g  15-30 g Oral Q15 Min PRN Everett Dyer MD        Or    dextrose 50 % injection 25-50 mL  25-50 mL Intravenous Q15 Min PRN Everett Dyer MD        Or    glucagon injection 1 mg  1 mg Subcutaneous Q15 Min PRN Everett Dyer MD        formoterol (PERFOROMIST) neb solution 20 mcg  20 mcg Nebulization Q12H Everett Dyer MD   20 mcg at 12/28/24 0839    furosemide (LASIX) tablet 20 mg  20 mg Oral Daily Everett Dyer MD   20 mg at 12/28/24 0818    hypromellose (ARTIFICIAL TEARS) 0.5 % ophthalmic solution 2 drop  2 drop Both Eyes BID PRN Everett Dyer MD        insulin aspart (NovoLOG) injection (RAPID ACTING)  1-7 Units Subcutaneous TID AC Everett Dyer MD   1 Units at 12/28/24 1808    insulin aspart (NovoLOG) injection (RAPID ACTING)  1-5 Units Subcutaneous At Bedtime Everett Dyer MD        ipratropium - albuterol 0.5 mg/2.5 mg/3 mL (DUONEB) neb solution 3 mL  1 vial  Nebulization TID PRN Everett Dyer MD        ipratropium-albuterol (COMBIVENT RESPIMAT) inhaler 1 puff  1 puff Inhalation 4x Daily Everett Dyer MD   1 puff at 12/28/24 1809    lidocaine (LMX4) cream   Topical Q1H PRN Everett Dyer MD        lidocaine 1 % 0.1-1 mL  0.1-1 mL Other Q1H PRN Everett Dyer MD        loperamide (IMODIUM) capsule 2 mg  2 mg Oral Q6H PRN Everett Dyer MD        methenamine hippurate (HIPREX) tablet 1 g  1 g Oral BID Everett Dyer MD   1 g at 12/28/24 2013    metoprolol tartrate (LOPRESSOR) half-tab 12.5 mg  12.5 mg Oral BID Everett Dyer MD   12.5 mg at 12/28/24 2021    ondansetron (ZOFRAN ODT) ODT tab 4 mg  4 mg Oral Q6H PRN Everett Dyer MD        Or    ondansetron (ZOFRAN) injection 4 mg  4 mg Intravenous Q6H PRN Everett Dyer MD        pantoprazole (PROTONIX) EC tablet 40 mg  40 mg Oral BID Everett Dyer MD   40 mg at 12/28/24 2014    polyethylene glycol (MIRALAX) Packet 17 g  17 g Oral BID PRN Everett Dyer MD        predniSONE (DELTASONE) tablet 40 mg  40 mg Oral Daily Laura Singh MD   40 mg at 12/28/24 0956    prochlorperazine (COMPAZINE) injection 5 mg  5 mg Intravenous Q6H PRN Everett Dyer MD        Or    prochlorperazine (COMPAZINE) tablet 5 mg  5 mg Oral Q6H PRN Everett Dyer MD        senna-docusate (SENOKOT-S/PERICOLACE) 8.6-50 MG per tablet 1 tablet  1 tablet Oral BID PRN Everett Dyer MD        Or    senna-docusate (SENOKOT-S/PERICOLACE) 8.6-50 MG per tablet 2 tablet  2 tablet Oral BID PRN Everett Dyer MD        senna-docusate (SENOKOT-S/PERICOLACE) 8.6-50 MG per tablet 2 tablet  2 tablet Oral BID Everett Dyer MD   2 tablet at 12/28/24 0817    sertraline (ZOLOFT) tablet 100 mg  100 mg Oral Daily Everett Dyer MD   100 mg at 12/28/24 0817    simethicone (MYLICON) chewable tablet 125 mg  125 mg Oral TID PRN Gomez,  "Everett Roberts MD        sodium chloride (PF) 0.9% PF flush 3 mL  3 mL Intracatheter Q8H Everett Dyer MD   3 mL at 12/28/24 1218    sodium chloride (PF) 0.9% PF flush 3 mL  3 mL Intracatheter q1 min prn Everett Dyer MD   3 mL at 12/28/24 2013           Physical Examination  /68 (BP Location: Right arm)   Pulse 75   Temp 97.5  F (36.4  C) (Oral)   Resp 18   Ht 1.829 m (6')   Wt 104 kg (229 lb 4.5 oz)   SpO2 96%   BMI 31.10 kg/m    Gen: NAD, coughing some, on 2 LNC  HEENT:, scleral anicteric , no scleral hemorrhages,   Op clear  Neck supple, no KATHARINA  CV: RRR  Lungs Coarse bs bilat  Abd soft, NT, ND  Ext; no c/c/e, no splinter hemorrhages or nodes  Neuro: L sided paralysis and LE spasms with passive movement.     LABORATORY DATA  Lab Results   Component Value Date    WBC 5.0 12/28/2024    WBC 7.7 06/30/2021     Lab Results   Component Value Date    RBC 4.02 12/28/2024    RBC 3.33 06/30/2021     Lab Results   Component Value Date    HGB 10.0 12/28/2024    HGB 9.6 06/30/2021     Lab Results   Component Value Date    HCT 34.8 12/28/2024    HCT 32.8 06/30/2021     No components found for: \"MCT\"  Lab Results   Component Value Date    MCV 87 12/28/2024    MCV 99 06/30/2021     Lab Results   Component Value Date    MCH 24.9 12/28/2024    MCH 28.8 06/30/2021     Lab Results   Component Value Date    MCHC 28.7 12/28/2024    MCHC 29.3 06/30/2021     Lab Results   Component Value Date    RDW 20.1 12/28/2024    RDW 17.8 06/30/2021     Lab Results   Component Value Date     12/28/2024     06/30/2021     Last Comprehensive Metabolic Panel:  Sodium   Date Value Ref Range Status   12/29/2024 140 135 - 145 mmol/L Final   06/30/2021 137 133 - 144 mmol/L Final     Potassium   Date Value Ref Range Status   12/29/2024 5.0 3.4 - 5.3 mmol/L Final   09/27/2022 3.4 3.4 - 5.3 mmol/L Final   09/27/2022 3.3 (L) 3.4 - 5.3 mmol/L Final   06/30/2021 3.8 3.4 - 5.3 mmol/L Final     Chloride   Date Value Ref " Range Status   12/29/2024 104 98 - 107 mmol/L Final   09/24/2022 104 94 - 109 mmol/L Final   06/30/2021 100 94 - 109 mmol/L Final     Carbon Dioxide   Date Value Ref Range Status   06/30/2021 34 (H) 20 - 32 mmol/L Final     Carbon Dioxide (CO2)   Date Value Ref Range Status   12/29/2024 30 (H) 22 - 29 mmol/L Final   09/24/2022 34 (H) 20 - 32 mmol/L Final     Anion Gap   Date Value Ref Range Status   12/29/2024 6 (L) 7 - 15 mmol/L Final   09/24/2022 4 3 - 14 mmol/L Final   06/30/2021 3 3 - 14 mmol/L Final     Glucose   Date Value Ref Range Status   12/29/2024 112 (H) 70 - 99 mg/dL Final   09/24/2022 139 (H) 70 - 99 mg/dL Final   06/30/2021 88 70 - 99 mg/dL Final     GLUCOSE BY METER POCT   Date Value Ref Range Status   12/29/2024 125 (H) 70 - 99 mg/dL Final     Comment:     Dr/RN Notified     Urea Nitrogen   Date Value Ref Range Status   12/29/2024 26.9 (H) 8.0 - 23.0 mg/dL Final   09/24/2022 14 7 - 30 mg/dL Final   06/30/2021 18 7 - 30 mg/dL Final     Creatinine   Date Value Ref Range Status   12/29/2024 0.97 0.67 - 1.17 mg/dL Final   06/30/2021 0.98 0.66 - 1.25 mg/dL Final     GFR Estimate   Date Value Ref Range Status   12/29/2024 78 >60 mL/min/1.73m2 Final     Comment:     eGFR calculated using 2021 CKD-EPI equation.   06/30/2021 73 >60 mL/min/[1.73_m2] Final     Comment:     Non  GFR Calc  Starting 12/18/2018, serum creatinine based estimated GFR (eGFR) will be   calculated using the Chronic Kidney Disease Epidemiology Collaboration   (CKD-EPI) equation.       GFR, ESTIMATED POCT   Date Value Ref Range Status   04/03/2023 >60 >60 mL/min/1.73m2 Final     Calcium   Date Value Ref Range Status   12/29/2024 8.7 (L) 8.8 - 10.4 mg/dL Final     Comment:     Reference intervals for this test were updated on 7/16/2024 to reflect our healthy population more accurately. There may be differences in the flagging of prior results with similar values performed with this method. Those prior results can be  interpreted in the context of the updated reference intervals.   06/30/2021 9.1 8.5 - 10.1 mg/dL Final     Bilirubin Total   Date Value Ref Range Status   12/26/2024 0.3 <=1.2 mg/dL Final   05/30/2021 1.7 (H) 0.2 - 1.3 mg/dL Final     Alkaline Phosphatase   Date Value Ref Range Status   12/26/2024 77 40 - 150 U/L Final   05/30/2021 67 40 - 150 U/L Final     ALT   Date Value Ref Range Status   12/26/2024 15 0 - 70 U/L Final   05/30/2021 12 0 - 70 U/L Final     AST   Date Value Ref Range Status   12/26/2024 19 0 - 45 U/L Final   05/30/2021 19 0 - 45 U/L Final     MICROBIOLOGY     0 Result Notes  Culture 50,000-100,000 CFU/mL Pseudomonas aeruginosa Abnormal    50,000-100,000 CFU/mL Enterococcus faecalis Abnormal         Resulting Agency: [facility]     Susceptibility     Pseudomonas aeruginosa Enterococcus faecalis     ZION ZION     Ampicillin   <=2 ug/mL Susceptible     Cefepime 2 ug/mL Susceptible       Ceftazidime 2 ug/mL Susceptible       Ciprofloxacin 0.12 ug/mL Susceptible <=0.5 ug/mL Susceptible *     Daptomycin   2 ug/mL Susceptible *     Doxycycline   8 ug/mL Intermediate *     Gentamicin Synergy   Susceptible... Susceptible *,1     Levofloxacin 0.5 ug/mL Susceptible 1 ug/mL Susceptible *     Linezolid   2 ug/mL Susceptible *     Meropenem <=0.25 ug/mL Susceptible       Nitrofurantoin   <=16 ug/mL Susceptible     Piperacillin/Tazobactam 8 ug/mL Susceptible       Streptomycin Synergy   Susceptible... Susceptible *     Tigecycline   <=0.12 ug/mL Susceptible *     Vancomycin   2 ug/mL Susceptible                   RADIOLOGY    EXAM: CT CHEST PULMONARY EMBOLISM W CONTRAST  LOCATION: [facility]  DATE: 12/26/2024  INDICATION: hypoxia, cough, bed bound, eval PE vs infection  COMPARISON: CT 10/11/2024  TECHNIQUE: CT chest pulmonary angiogram during arterial phase injection of IV contrast. Multiplanar reformats and MIP reconstructions were performed. Dose reduction techniques were used.   CONTRAST: 77  mL Isovue 370  FINDINGS:  ANGIOGRAM CHEST: Pulmonary arteries are normal caliber and negative for pulmonary emboli. Thoracic aorta is negative for dissection. No CT evidence of right heart strain.  LUNGS AND PLEURA: Moderate upper lobe predominant centrilobular emphysema. Bibasilar hypoventilatory changes without definite airspace consolidation, pleural effusion or pneumothorax. Central airways are patent. Lower lobe predominant bronchial wall   thickening is noted. Calcified granulomas are noted.  MEDIASTINUM/AXILLAE: Stable size of mildly enlarged mediastinal and hilar lymph nodes with representative right paratracheal lymph node measuring 1.6 cm in short axis, previously 1.2 cm (series 5 image 72). No definite new lymphadenopathy. No pericardial   effusion.  CORONARY ARTERY CALCIFICATION: Severe.  UPPER ABDOMEN: Cholecystectomy.  MUSCULOSKELETAL: No acute bony abnormality                                                        IMPRESSION:  1.  Possible mild bronchitis without jean airspace consolidation or pleural effusion.  2.  No pulmonary embolus.  3.  Persistent enlarged mediastinal and hilar lymph nodes which again could be reactive but consider follow-up chest CT in 6 months to document stability/resolution.     IMPRESSION AND PLAN  85 yo man with prior CVA and L sided residual paralysis, dysphagia, chronic hypoxia and respiratory failure, urinary retention with chirinos,   Admitted with sepsis likely urinary tract > respiratory  .  Sepsis / urinary tract origin / UTI / pyelonephritis  Respiratory failure, acute on chronic  Fever    His urine on admission had pyuria and grew both Pseudomonas and Enterococcus in equal numbers.  While both can be pathogens, he has improved with therpay directed against the pseudomonas (cephalosporins have little activity against Enterococcus)  However, I think it is reasonable to cover both with a single agent for now  He may have had some pulmonary infection (chronically) but  seems back to baseline now    Recommendations  Stopping the cefepime and starting iv zosyn for now with oral conversion on discharge possible (Levaquin  +/- Amox)    Thank you very much for this consultation, we will follow      Deric Davis MD

## 2024-12-30 ENCOUNTER — APPOINTMENT (OUTPATIENT)
Dept: SPEECH THERAPY | Facility: CLINIC | Age: 84
End: 2024-12-30
Attending: HOSPITALIST
Payer: MEDICARE

## 2024-12-30 LAB
ALBUMIN SERPL BCG-MCNC: 3.1 G/DL (ref 3.5–5.2)
ALLEN'S TEST: NO
ALP SERPL-CCNC: 67 U/L (ref 40–150)
ALT SERPL W P-5'-P-CCNC: 23 U/L (ref 0–70)
ANION GAP SERPL CALCULATED.3IONS-SCNC: 8 MMOL/L (ref 7–15)
AST SERPL W P-5'-P-CCNC: 36 U/L (ref 0–45)
BASE EXCESS BLDA CALC-SCNC: 6.4 MMOL/L (ref -3–3)
BILIRUB SERPL-MCNC: 0.4 MG/DL
BUN SERPL-MCNC: 22.7 MG/DL (ref 8–23)
CALCIUM SERPL-MCNC: 8.6 MG/DL (ref 8.8–10.4)
CHLORIDE SERPL-SCNC: 100 MMOL/L (ref 98–107)
CREAT SERPL-MCNC: 1.07 MG/DL (ref 0.67–1.17)
EGFRCR SERPLBLD CKD-EPI 2021: 69 ML/MIN/1.73M2
ERYTHROCYTE [DISTWIDTH] IN BLOOD BY AUTOMATED COUNT: 20.2 % (ref 10–15)
GLUCOSE BLDC GLUCOMTR-MCNC: 101 MG/DL (ref 70–99)
GLUCOSE BLDC GLUCOMTR-MCNC: 106 MG/DL (ref 70–99)
GLUCOSE BLDC GLUCOMTR-MCNC: 144 MG/DL (ref 70–99)
GLUCOSE BLDC GLUCOMTR-MCNC: 184 MG/DL (ref 70–99)
GLUCOSE BLDC GLUCOMTR-MCNC: 202 MG/DL (ref 70–99)
GLUCOSE SERPL-MCNC: 189 MG/DL (ref 70–99)
HCO3 BLD-SCNC: 32 MMOL/L (ref 21–28)
HCO3 SERPL-SCNC: 30 MMOL/L (ref 22–29)
HCT VFR BLD AUTO: 34.8 % (ref 40–53)
HGB BLD-MCNC: 10.5 G/DL (ref 13.3–17.7)
MCH RBC QN AUTO: 25 PG (ref 26.5–33)
MCHC RBC AUTO-ENTMCNC: 30.2 G/DL (ref 31.5–36.5)
MCV RBC AUTO: 83 FL (ref 78–100)
O2/TOTAL GAS SETTING VFR VENT: 29 %
OXYHGB MFR BLDA: 89 % (ref 92–100)
PCO2 BLD: 48 MM HG (ref 35–45)
PEEP: 0 CM H2O
PH BLD: 7.43 [PH] (ref 7.35–7.45)
PLATELET # BLD AUTO: 123 10E3/UL (ref 150–450)
PO2 BLD: 59 MM HG (ref 80–105)
POTASSIUM SERPL-SCNC: 4.3 MMOL/L (ref 3.4–5.3)
PROT SERPL-MCNC: 7.1 G/DL (ref 6.4–8.3)
RBC # BLD AUTO: 4.2 10E6/UL (ref 4.4–5.9)
SAO2 % BLDA: 90.6 % (ref 95–96)
SODIUM SERPL-SCNC: 138 MMOL/L (ref 135–145)
WBC # BLD AUTO: 4 10E3/UL (ref 4–11)

## 2024-12-30 PROCEDURE — 82805 BLOOD GASES W/O2 SATURATION: CPT | Performed by: NURSE PRACTITIONER

## 2024-12-30 PROCEDURE — 82040 ASSAY OF SERUM ALBUMIN: CPT | Performed by: NURSE PRACTITIONER

## 2024-12-30 PROCEDURE — 250N000012 HC RX MED GY IP 250 OP 636 PS 637: Performed by: STUDENT IN AN ORGANIZED HEALTH CARE EDUCATION/TRAINING PROGRAM

## 2024-12-30 PROCEDURE — 36600 WITHDRAWAL OF ARTERIAL BLOOD: CPT

## 2024-12-30 PROCEDURE — 36415 COLL VENOUS BLD VENIPUNCTURE: CPT | Performed by: HOSPITALIST

## 2024-12-30 PROCEDURE — 250N000013 HC RX MED GY IP 250 OP 250 PS 637: Performed by: STUDENT IN AN ORGANIZED HEALTH CARE EDUCATION/TRAINING PROGRAM

## 2024-12-30 PROCEDURE — 36415 COLL VENOUS BLD VENIPUNCTURE: CPT | Performed by: NURSE PRACTITIONER

## 2024-12-30 PROCEDURE — 250N000013 HC RX MED GY IP 250 OP 250 PS 637: Performed by: HOSPITALIST

## 2024-12-30 PROCEDURE — 99232 SBSQ HOSP IP/OBS MODERATE 35: CPT | Performed by: HOSPITALIST

## 2024-12-30 PROCEDURE — 87040 BLOOD CULTURE FOR BACTERIA: CPT | Performed by: HOSPITALIST

## 2024-12-30 PROCEDURE — 99207 PR APP CREDIT; MD BILLING SHARED VISIT: CPT | Performed by: NURSE PRACTITIONER

## 2024-12-30 PROCEDURE — 85014 HEMATOCRIT: CPT | Performed by: NURSE PRACTITIONER

## 2024-12-30 PROCEDURE — 250N000011 HC RX IP 250 OP 636: Performed by: INTERNAL MEDICINE

## 2024-12-30 PROCEDURE — 250N000009 HC RX 250: Performed by: STUDENT IN AN ORGANIZED HEALTH CARE EDUCATION/TRAINING PROGRAM

## 2024-12-30 PROCEDURE — 80053 COMPREHEN METABOLIC PANEL: CPT | Performed by: NURSE PRACTITIONER

## 2024-12-30 PROCEDURE — 250N000012 HC RX MED GY IP 250 OP 636 PS 637: Performed by: HOSPITALIST

## 2024-12-30 PROCEDURE — 92526 ORAL FUNCTION THERAPY: CPT | Mod: GN | Performed by: SPEECH-LANGUAGE PATHOLOGIST

## 2024-12-30 PROCEDURE — 120N000001 HC R&B MED SURG/OB

## 2024-12-30 PROCEDURE — 94640 AIRWAY INHALATION TREATMENT: CPT

## 2024-12-30 PROCEDURE — 92610 EVALUATE SWALLOWING FUNCTION: CPT | Mod: GN | Performed by: SPEECH-LANGUAGE PATHOLOGIST

## 2024-12-30 PROCEDURE — 999N000157 HC STATISTIC RCP TIME EA 10 MIN

## 2024-12-30 PROCEDURE — 94640 AIRWAY INHALATION TREATMENT: CPT | Mod: 76

## 2024-12-30 RX ORDER — PREDNISONE 20 MG/1
40 TABLET ORAL DAILY
Qty: 4 TABLET | Refills: 0 | Status: SHIPPED | OUTPATIENT
Start: 2024-12-30 | End: 2025-01-02

## 2024-12-30 RX ADMIN — ASPIRIN 81 MG: 81 TABLET, COATED ORAL at 09:20

## 2024-12-30 RX ADMIN — PANTOPRAZOLE SODIUM 40 MG: 40 TABLET, DELAYED RELEASE ORAL at 09:20

## 2024-12-30 RX ADMIN — AZITHROMYCIN DIHYDRATE 500 MG: 250 TABLET ORAL at 09:20

## 2024-12-30 RX ADMIN — IPRATROPIUM BROMIDE AND ALBUTEROL 1 PUFF: 20; 100 SPRAY, METERED RESPIRATORY (INHALATION) at 13:33

## 2024-12-30 RX ADMIN — PANTOPRAZOLE SODIUM 40 MG: 40 TABLET, DELAYED RELEASE ORAL at 20:29

## 2024-12-30 RX ADMIN — METHENAMINE HIPPURATE 1 G: 1 TABLET ORAL at 09:28

## 2024-12-30 RX ADMIN — IPRATROPIUM BROMIDE AND ALBUTEROL 1 PUFF: 20; 100 SPRAY, METERED RESPIRATORY (INHALATION) at 17:10

## 2024-12-30 RX ADMIN — DEXTRAN 70, GLYCERIN, HYPROMELLOSE 2 DROP: 1; 2; 3 SOLUTION/ DROPS OPHTHALMIC at 20:27

## 2024-12-30 RX ADMIN — IPRATROPIUM BROMIDE AND ALBUTEROL 1 PUFF: 20; 100 SPRAY, METERED RESPIRATORY (INHALATION) at 21:50

## 2024-12-30 RX ADMIN — FORMOTEROL FUMARATE DIHYDRATE 20 MCG: 20 SOLUTION RESPIRATORY (INHALATION) at 07:25

## 2024-12-30 RX ADMIN — PIPERACILLIN AND TAZOBACTAM 4.5 G: 4; .5 INJECTION, POWDER, FOR SOLUTION INTRAVENOUS at 10:29

## 2024-12-30 RX ADMIN — ATORVASTATIN CALCIUM 10 MG: 10 TABLET, FILM COATED ORAL at 09:20

## 2024-12-30 RX ADMIN — FUROSEMIDE 20 MG: 20 TABLET ORAL at 09:20

## 2024-12-30 RX ADMIN — ACETAMINOPHEN 650 MG: 325 TABLET, FILM COATED ORAL at 13:33

## 2024-12-30 RX ADMIN — PREDNISONE 40 MG: 20 TABLET ORAL at 09:20

## 2024-12-30 RX ADMIN — ACETAMINOPHEN 650 MG: 325 TABLET, FILM COATED ORAL at 09:20

## 2024-12-30 RX ADMIN — SENNOSIDES AND DOCUSATE SODIUM 2 TABLET: 50; 8.6 TABLET ORAL at 20:29

## 2024-12-30 RX ADMIN — INSULIN ASPART 1 UNITS: 100 INJECTION, SOLUTION INTRAVENOUS; SUBCUTANEOUS at 21:51

## 2024-12-30 RX ADMIN — ALLOPURINOL 300 MG: 300 TABLET ORAL at 09:20

## 2024-12-30 RX ADMIN — SERTRALINE HYDROCHLORIDE 100 MG: 100 TABLET ORAL at 09:20

## 2024-12-30 RX ADMIN — PIPERACILLIN AND TAZOBACTAM 4.5 G: 4; .5 INJECTION, POWDER, FOR SOLUTION INTRAVENOUS at 21:50

## 2024-12-30 RX ADMIN — METHENAMINE HIPPURATE 1 G: 1 TABLET ORAL at 20:28

## 2024-12-30 RX ADMIN — FORMOTEROL FUMARATE DIHYDRATE 20 MCG: 20 SOLUTION RESPIRATORY (INHALATION) at 17:43

## 2024-12-30 RX ADMIN — PIPERACILLIN AND TAZOBACTAM 4.5 G: 4; .5 INJECTION, POWDER, FOR SOLUTION INTRAVENOUS at 15:52

## 2024-12-30 RX ADMIN — IPRATROPIUM BROMIDE AND ALBUTEROL 1 PUFF: 20; 100 SPRAY, METERED RESPIRATORY (INHALATION) at 09:28

## 2024-12-30 RX ADMIN — PIPERACILLIN AND TAZOBACTAM 4.5 G: 4; .5 INJECTION, POWDER, FOR SOLUTION INTRAVENOUS at 04:14

## 2024-12-30 RX ADMIN — METOPROLOL TARTRATE 12.5 MG: 25 TABLET, FILM COATED ORAL at 20:29

## 2024-12-30 ASSESSMENT — ACTIVITIES OF DAILY LIVING (ADL)
ADLS_ACUITY_SCORE: 78
ADLS_ACUITY_SCORE: 77
ADLS_ACUITY_SCORE: 78
ADLS_ACUITY_SCORE: 77
ADLS_ACUITY_SCORE: 78
ADLS_ACUITY_SCORE: 78
ADLS_ACUITY_SCORE: 77
ADLS_ACUITY_SCORE: 77
DEPENDENT_IADLS:: CLEANING;COOKING;LAUNDRY;SHOPPING;MEAL PREPARATION;MEDICATION MANAGEMENT;MONEY MANAGEMENT;TRANSPORTATION
ADLS_ACUITY_SCORE: 77
ADLS_ACUITY_SCORE: 77
ADLS_ACUITY_SCORE: 78
ADLS_ACUITY_SCORE: 77
ADLS_ACUITY_SCORE: 78

## 2024-12-30 NOTE — PLAN OF CARE
Summary:  Sepsis likely due to CAUTI in chronic chirinos as well as possible bronchitis   DATE & TIME: 11/29/24, 2040-3245  Cognitive Concerns/ Orientation : A&O x 4, forgetful, very pleasant, [place]  BEHAVIOR & AGGRESSION TOOL COLOR: Green  CIWA SCORE: NA   ABNL VS/O2: VSS, afebrile this shift. On 2L O2 via NC (baseline), sats low 90s.   MOBILITY: Ax2, lift. Turns Q2h. L sided weakness from previous CVA.   PAIN MANAGMENT: Denies   DIET: Pureed diet, moderately thick liquids. good appetite when fed. Pills whole and 1 at a time with apple sauce. Swallowing well for nursing, speech consulted to advance diet.   BOWEL/BLADDER: Chronic chirinos, good UOP. No BM this shift.   ABNL LAB/BG: , 145 Urea 26.9, calcium 8.7   DRAIN/DEVICES: R PIV SL with intermittent abx   TELEMETRY RHYTHM: discontinued  SKIN: Peeling to sacrum/coccyx mepilex in place, redness to scrotum, and scattered bruising. Peeling heels. clammy skin.   TESTS/PROCEDURES: NA   D/C DAY/GOALS/PLACE: Pending improvement. From [facility].  OTHER IMPORTANT INFO: LS coarse, infrequent congested cough.Scheduled nebs and inhalers. IS encouraged.Contact precautions maintained for VRE. ID consult today- plan to start IV zosyn for now with oral conversion on discharge. SLP consult placed

## 2024-12-30 NOTE — PLAN OF CARE
Summary:  Sepsis likely due to CAUTI in chronic chirinos as well as possible bronchitis     DATE & TIME: 11/29/24, [id number]  Cognitive Concerns/ Orientation: A&O x 4, forgetful, very pleasant, [place]  BEHAVIOR & AGGRESSION TOOL COLOR: Green    ABNL VS/O2: Temp 99.4, /76, 2 LPM oxygen via nasal cannula (baseline), sats low 90s  MOBILITY: Assist-2, lift; Turn/repo Q2h. L sided weakness from previous CVA   PAIN MANAGMENT: Denies   DIET: Pureed diet (level 4), moderately thick liquids (level 3)--assist with feed; Pills whole one at a time in applesauce; SLP to reassess diet consistency   BOWEL/BLADDER: Chronic chirinos, No BM this shift.   ABNL LAB/BG: , 145 Urea 26.9, calcium 8.7   DRAIN/DEVICES: R PIV saline locked, IV Zosyn (Q6hrs)  SKIN: Peeling to sacrum/coccyx mepilex in place, redness to scrotum, and scattered bruising, Peeling heels, clammy skin  TESTS/PROCEDURES: N/A   D/C DAY/GOALS/PLACE: Pending improvement, back to senior care, possibly 12/30  OTHER IMPORTANT INFO: Contact precaution for VRE (11/2021); LS coarse, infrequent congested cough-Scheduled nebs, inhalers; ID/SLP/SW following; switch to oral antibiotics upon d/c

## 2024-12-30 NOTE — CONSULTS
Care Management Initial Consult    General Information  Assessment completed with: Care Team Member, RYLAN-chart review, AINSLEY [facility]  Type of CM/SW Visit: Initial Assessment    Primary Care Provider verified and updated as needed: Yes   Readmission within the last 30 days: no previous admission in last 30 days      Reason for Consult: discharge planning  Advance Care Planning: Advance Care Planning Reviewed: present on chart     General Information Comments: High readmission risk    Communication Assessment  Patient's communication style: spoken language (English or Bilingual)    Hearing Difficulty or Deaf: no   Wear Glasses or Blind: no    Cognitive  Cognitive/Neuro/Behavioral: WDL  Level of Consciousness: alert  Arousal Level: opens eyes spontaneously  Orientation: oriented x 4  Mood/Behavior: calm, cooperative  Best Language: 0 - No aphasia  Speech: clear    Living Environment:   People in home: facility resident     Current living Arrangements: assisted living  Name of Facility: [facility]   Able to return to prior arrangements: yes  Living Arrangement Comments: Needs to return to [facility] by 3:00 PM    Family/Social Support:  Care provided by: other (see comments), homecare agency  Provides care for: no one, unable/limited ability to care for self  Marital Status:   Support system: Facility resident(s)/Staff          Description of Support System: Involved    Support Assessment: Adequate family and caregiver support    Current Resources:   Patient receiving home care services: Yes  Skilled Home Care Services: Skilled Nursing (only for urinary catheter change and wound care (if he had this need))     Community Resources: Home Care  Equipment currently used at home: grab bar, toilet, grab bar, tub/shower, lift device, shower chair, wheelchair, power  Supplies currently used at home: Incontinence Supplies    Employment/Financial:  Employment Status: retired        Financial  Concerns: none      Does the patient's insurance plan have a 3 day qualifying hospital stay waiver?  No    Lifestyle & Psychosocial Needs:  Social Drivers of Health     Food Insecurity: Low Risk  (12/28/2024)    Food Insecurity     Within the past 12 months, did you worry that your food would run out before you got money to buy more?: No     Within the past 12 months, did the food you bought just not last and you didn t have money to get more?: No   Depression: Not on file   Housing Stability: Low Risk  (12/28/2024)    Housing Stability     Do you have housing? : Yes     Are you worried about losing your housing?: No   Tobacco Use: Medium Risk (7/15/2024)    Patient History     Smoking Tobacco Use: Former     Smokeless Tobacco Use: Never     Passive Exposure: Not on file   Financial Resource Strain: Low Risk  (12/28/2024)    Financial Resource Strain     Within the past 12 months, have you or your family members you live with been unable to get utilities (heat, electricity) when it was really needed?: No   Alcohol Use: Not on file   Transportation Needs: Low Risk  (12/28/2024)    Transportation Needs     Within the past 12 months, has lack of transportation kept you from medical appointments, getting your medicines, non-medical meetings or appointments, work, or from getting things that you need?: No   Physical Activity: Not on file   Interpersonal Safety: Low Risk  (12/27/2024)    Interpersonal Safety     Do you feel physically and emotionally safe where you currently live?: Yes     Within the past 12 months, have you been hit, slapped, kicked or otherwise physically hurt by someone?: No     Within the past 12 months, have you been humiliated or emotionally abused in other ways by your partner or ex-partner?: No   Stress: Not on file   Social Connections: Not on file   Health Literacy: Not on file       Functional Status:  Prior to admission patient needed assistance:   Dependent ADLs:: Bathing, Dressing, Grooming,  Incontinence, Positioning, Transfers, Wheelchair-with assist, Toileting  Dependent IADLs:: Cleaning, Cooking, Laundry, Shopping, Meal Preparation, Medication Management, Money Management, Transportation       Mental Health Status:  Mental Health Status: No Current Concerns       Chemical Dependency Status:  Chemical Dependency Status: No Current Concerns             Values/Beliefs:  Spiritual, Cultural Beliefs, Faith Practices, Values that affect care: no               Discussed  Partnership in Safe Discharge Planning  document with patient/family: No    Additional Information:  Care Management consulted due to high readmission risk and discharge planning.  Chart was reviewed.  Patient was asleep when CC went to meet with him.  Patient resides at [facility].  Have conversed with AINSLEY Varner from the [facility] ([phone]).  Patient receives full cares and medication management along with laundry services and housekeeping..  Patient is able to feed himself.  Patient is mostly bed bound and gets up via aleks lift to his motorized wheelchair.    Patient is on chronic oxygen therapy at 2L/NC and receives nebulizer treatments 3x per day and also as needed.  Liz thought the oxygen company was [employer].  Patient has a chronic chirinos catheter that is changed by [facility].  This is the only service this home care agency provides to patient.  Liz will be in communication with this agency.    Patient's PCP is thru the [facility] and they see patient at the [facility].  Liz will communicate follow up need with his PCP.    Planning discharge tomorrow pending remains afebrile. It is not looking promising due to still febrile this afternoon.  Liz is asking that patient return by 3:00 PM if possible.  New medications are to be filled here and sent with patient.  Orders will need to be faxed to [facility] at ([phone]).  This facility will not be able to accept back on Wednesday  due to low staff availability with the Holiday.     has also communicated with patient's Stepdaughter López concerning tentative discharge tomorrow.  Patient will need stretcher transport.  López is in agreement for stretcher transport.  López noted she had talked to patient this afternoon and noted he was quite confused.  This is not patient's baseline and she felt he sounded much better yesterday.    Next Steps:   CM is following  Will need stretcher transport      Valery Lima, RN  Inpatient Care Management  [phone]

## 2024-12-30 NOTE — PLAN OF CARE
"Goal Outcome Evaluation:      Plan of Care Reviewed With: patient    Summary: Sepsis likely due to CAUTI in chronic chirinos as well as possible bronchitis   DATE & TIME: 12/30/24, 6915-8120  Cognitive Concerns/ Orientation: A&O x 4, forgetful, very pleasant, [place]. More confused today. Stated he felt like he was \"underwater\" but had just awoken from a nap.   BEHAVIOR & AGGRESSION TOOL COLOR: Green    ABNL VS/O2: VSS ex febrile- max 102.1, trending down with tylenol- (MD aware), bradycardic- HR 50-55, and required increase in O2 needs early am. Currently on 3 L NC, sats low to mid 90s. 2 LPM oxygen via nasal cannula (baseline)  MOBILITY: Assist-2, lift; Turn/repo Q2h. L sided weakness from previous CVA   PAIN MANAGMENT: Denies   DIET: Pureed diet (level 4), mild thick liquids--assist with feed; Pills whole one at a time in applesauce; SLP saw patient today- per note continues to be at risk for aspiration. Continue current diet with change to mildly thicken. May return to moderately thick if coughing. Poor appetite due to limited meal selections.   BOWEL/BLADDER: Chronic chirinos, No BM this shift.   ABNL LAB/BG: , 144   DRAIN/DEVICES: R PIV saline locked, IV Zosyn (Q6hrs)  SKIN: Peeling to sacrum/coccyx mepilex in place, redness to scrotum, and scattered bruising, Peeling heels, clammy skin. 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   "

## 2024-12-30 NOTE — PROGRESS NOTES
[facility]    Hospitalist Progress Note    Date of Service (when I saw the patient): 12/30/2024    Assessment & Plan   Ron Gilmore is a 84 year old male admitted on 12/26/2024. He is admitted with sepsis likely due to CAUTI in chronic chirinos as well as possible bronchitis.      Sepsis (fever, tachycardia, UTI)  CAUTI, suspected  Chronic chirinos, POA  UA appears infeccted but bladder is chronically cathed. Has suprapubic tenderness on admission   CT with possible bronchitis. Patient has dry, non-productive cough  UCX shows pseudomonas and enterococcus.  BCX NTD.  Was afebrile for 2 days but has fever of 102 today.  Initially on cefepime-->zosyn per ID (coverage for both bacteria).  - Follow blood cultures.  - Continue zosyn.  - ID on board.  - chirinos changed in ED  - Follow ID recs.     Hx of CVA with residual left sided deficits  HTN  HLD  -continue ASA, statin, lasix, metoprolol     Oropharyngeal dysphagia  - Diet per SLP.       DMT2  Not on PTA meds. Glucose elevated on admission  - med dose sliding scale     BRAD  Acute on Chronic Respiratory failure with hypoxia,  on 2L O2 at baseline, 3L on admission   COPD with acute exacerbation  PNA vs bronchitis.  Sob improved to  baseline  Requiring less oxygen (back to 2 L nc--at baseline).  Wheezing resolved.  CXR on 12/28 shows atalectasis vs PNA. CO2 at baseline on VBG..  - Azithromycin x 3 days  - Cont Prednisone burst .  - Does not tolerate bipap  - Continue PTA nebs       BPH  Depression  Anxiety  - resume PTA meds.     GERD  - PPI     Gout  - continue allopurinol      Mediastinal and hilar adenopathy  - Consider repeat CT in 6 months       Diet: Combination Diet Regular Diet Adult; Pureed Diet (level 4); Liquidized/Moderately Thick (level 3)  DVT Prophylaxis: Pneumatic Compression Devices  Chirinos Catheter: Not present  Lines: None     Cardiac Monitoring: ACTIVE order. Indication: Tachyarrhythmias, acute (48 hours)  Code Status: No CPR- Do NOT  Intubate       Disposition Plan  Not ready today because of fever.  Reassess tomorrow.  Abx plan per ID prior to discharge.    Back to [facility].        Laura Singh MD    Interval History   Breathing at baseline.  No cp.  Awake and oriented x3.      Physical Exam   Temp: 99.4  F (37.4  C) Temp src: Oral BP: (!) 150/76 Pulse: 81   Resp: 16 SpO2: 90 % O2 Device: Nasal cannula Oxygen Delivery: 3 LPM  Vitals:    12/26/24 1530 12/27/24 0619   Weight: 99.8 kg (220 lb) 104 kg (229 lb 4.5 oz)     Vital Signs with Ranges  Temp:  [98.1  F (36.7  C)-99.4  F (37.4  C)] 99.4  F (37.4  C)  Pulse:  [76-82] 81  Resp:  [16-18] 16  BP: (119-150)/(64-77) 150/76  SpO2:  [89 %-96 %] 90 %  I/O last 3 completed shifts:  In: 720 [P.O.:720]  Out: 2010 [Urine:2010]    Constitutional: Awake, alert, cooperative, no apparent distress  Respiratory: Clear to auscultation bilaterally, no crackles or wheezing  Cardiovascular: Regular rate and rhythm, normal S1 and S2, and no murmur noted  GI: Normal bowel sounds, soft, non-distended, non-tender  Skin/Integumen: No rashes, no cyanosis, no edema  : Suprapubic tenderness, Cardona with clear yellow urine.    LABS/MEDS:

## 2024-12-30 NOTE — PROGRESS NOTES
12/30/24 2732   Appointment Info   Signing Clinician's Name / Credentials (SLP) Saritha Castro MS CCC SLP   General Information   Onset of Illness/Injury or Date of Surgery 12/26/24   Referring Physician Everett Dyer   Patient/Family Therapy Goal Statement (SLP) Patient did not state.   Pertinent History of Current Problem Per notes; Ron Gilmore is a 84 year old male admitted on 12/26/2024. He is admitted with sepsis likely due to CAUTI in chronic chirinos as well as possible bronchitis.      Sepsis (fever, tachycardia, UTI)  CAUTI, suspected  Chronic chirinos, POA  Possible bronchitis   General Observations Pleasant and cooperative   Type of Evaluation   Type of Evaluation Swallow Evaluation   Oral Motor   Oral Musculature anomalies present   Structural Abnormalities none present   Dentition (Oral Motor)   Comment, Dentition (Oral Motor) Lower denture not present   Dentition (Oral Motor) dental appliance/dentures   Dental Appliance/Denture (Oral Motor) upper   Facial Symmetry (Oral Motor)   Facial Symmetry (Oral Motor) bilateral impairment   Bilateral Facial Asymmetry left side;moderate impairment;right side;minimal impairment   Lip Function (Oral Motor)   Lip Range of Motion (Oral Motor) protrusion impairment   Lip Strength (Oral Motor) left side;mild impairment   Protrusion, Lip Range of Motion left side;minimal impairment   Tongue Function (Oral Motor)   Tongue Strength (Oral Motor) WFL   Tongue Coordination/Speed (Oral Motor) slows down progressively   Tongue ROM (Oral Motor) elevation is impaired   Elevation, Tongue ROM Impairment (Oral Motor) bilateral;minimal impairment   Jaw Function (Oral Motor)   Jaw Function (Oral Motor) WNL   Cough/Swallow/Gag Reflex (Oral Motor)   Volitional Throat Clear/Cough (Oral Motor) impaired;reduced strength   Volitional Swallow (Oral Motor) mildly delayed   Vocal Quality/Secretion Management (Oral Motor)   Vocal Quality (Oral Motor) WFL   General Swallowing Observations    Past History of Dysphagia Patient last seen for a video swallow on 10/30/24  Patient presents with moderate oral and pharyngeal dysphagia on today's study characterized by mildly reduced bolus control during AP movement with premature entry, mildly reduced BOT retraction and epilgottic inversion/retraction. No penetration or aspiration occurred during the study, but is at risk due to premature spillage and slow epiglottic movement. Timing/control of the swallow was best with thicker consistencies. Recommend: 1. IDDSI level 4 puree and moderately thick liquids. 2. Assistance with feeding, upright, liquids by spoon to start with, slow pace, effortful swallows, alternate liquids/solids. 3. SLP will follow for diet tolerance, advancement and execises.   Respiratory Support supplemental oxygen   Current Diet/Method of Nutritional Intake (General Swallowing Observations, NIS) pureed (dysphagia pureed) (level 4);moderately thick (honey-thick) liquids (level 3)   Swallowing Evaluation Clinical swallow evaluation   Clinical Swallow Evaluation   Feeding Assistance dependent   Clinical Swallow Evaluation Textures Trialed mildly thick liquids;pureed;soft & bite-sized   Clinical Swallow Eval: Mildly Thick Liquids   Volume Presented 4 oz of juice   Oral Phase premature pharyngeal entry   Pharyngeal Phase impaired;repeated swallows   Diagnostic Statement premature entry suspected without overt sx of aspiration via the spoon or cup.   Clinical Swallow Evaluation: Puree Solid Texture Trial   Mode of Presentation, Puree spoon;fed by clinician   Volume of Puree Presented 4 teaspoons of pudding   Oral Phase, Puree WFL   Pharyngeal Phase, Puree impaired;repeated swallows   Diagnostic Statement Mild delay in AP movement clearing oral cavity. No overt sx of aspiration.   Clinical Swallow Eval: Soft & Bite Sized   Mode of Presentation spoon;fed by clinician   Volume Presented 1 teaspoon of soft/bite size   Oral Phase impaired  mastication;delayed AP movement;residue in oral cavity;premature pharyngeal entry   Oral Residue soft palate;mid posterior tongue   Pharyngeal Phase impaired;cough/choking;repeated swallows;other (see comments)  (gagging almost vomited.)   Swallowing Recommendations   Diet Consistency Recommendations pureed (level 4);mildly thick liquids (level 2)   Supervision Level for Intake close supervision needed   Mode of Delivery Recommendations bolus size, small;food moistened;no straws;slow rate of intake   Postural Recommendations none   Swallowing Maneuver Recommendations alternate food and liquid intake;effortful (hard) swallow   Monitoring/Assistance Required (Eating/Swallowing) stop eating activities when fatigue is present;monitor for cough or change in vocal quality with intake   Recommended Feeding/Eating Techniques (Swallow Eval) maintain upright sitting position for eating;maintain upright posture during/after eating for 30 minutes;minimize distractions during oral intake;set-up and prepare tray;provide assist with feeding   Medication Administration Recommendations, Swallowing (SLP) Whole or crushed in apple sauce as tolerated.   Instrumental Assessment Recommendations instrumental evaluation not recommended at this time   General Therapy Interventions   Planned Therapy Interventions Dysphagia Treatment   Dysphagia treatment Modified diet education;Instruction of safe swallow strategies   Clinical Impression   Criteria for Skilled Therapeutic Interventions Met (SLP Eval) Yes, treatment indicated   SLP Diagnosis Moderate oral and pharyngeal dysphagia   Risks & Benefits of therapy have been explained evaluation/treatment results reviewed;care plan/treatment goals reviewed;risks/benefits reviewed;current/potential barriers reviewed;participants voiced agreement with care plan;participants included;patient   Clinical Impression Comments Patient presents with moderate oral and pharyngeal dysphagia at bedside. Patient  is well known to this department on multiple admissions. Oral motor function was mildly impaired on the left. He demonstrated premature entry of mildly thick liquids without overt sx of aspiration via the spoon or small single sips from the cup with assistance. Mastication was insufficient for soft and bite size texture with premature entry suspected due to loss of the bolus with immediate coughing, gagging and almost vomited. Patient continues to be at risk for aspiration and needs to be monitored closely.   Recommend: 1. Continue on IDDSI level 4 puree and change to mildly thick liquids. 2. Needs assistance with feeding, no straws, small bites/sips, alternate liquids/solids. If increased coughing noted with mildly thick change back to moderately thick.   SLP Total Evaluation Time   Eval: oral/pharyngeal swallow function, clinical swallow Minutes (61881) 16   SLP Goals   Therapy Frequency (SLP Eval) 4 times/week   SLP Predicted Duration/Target Date for Goal Attainment 01/09/25   SLP Goals Swallow   SLP: Safely tolerate diet without signs/symptoms of aspiration Soft & bite sized diet;Mildly thick liquids;With use of swallow precautions;With assistance/supervision   Interventions   Interventions Quick Adds Swallowing Dysfunction   Swallowing Intervention   Treatment of Swallowing Dysfunction &/or Oral Function for Feeding Minutes (23838) 10   Symptoms Noted During/After Treatment Shortness of breath   Treatment Detail/Skilled Intervention Patient provided education on the rationale for continued pureed diet but can change the liquids to mildly thick. Swallow strategies implemented by therapist and education provided to patient. Will need on going education and training.   SLP Discharge Planning   SLP Plan meal f/u and increased textures as tolerated.   SLP Discharge Recommendation home with home health   SLP Rationale for DC Rec Patient's swallow function is mildly below baseline or near baseline.   SLP Brief  overview of current status  Patient continues to be at risk for aspiration. Continue on the puree diet and changed to mildly thick liquids. If couging with mildly thick return to moderately thick.   SLP Time and Intention   Total Session Time (sum of timed and untimed services) 26

## 2024-12-30 NOTE — PLAN OF CARE
Goal Outcome Evaluation:                 Outcome Evaluation: Patient will transition back to [facility]

## 2024-12-30 NOTE — PROVIDER NOTIFICATION
MD Notification    Notified Person: MD    Notified Person Name: Francisco    Notification Date/Time: 12/30/2024, 0908     Notification Interaction: [employer] messaging     Purpose of Notification: Hi Dr. Singh. Just a quick update. He spiked a temp this morning of 102.1. I will give him PRN tylenol. also His O2 needs have increased. He is around 90% on 4 L. Feeling SOB and coughing frequently. Otherwise appears the same from yesterday. thanks.    Also, his HR is 50-55. Would you like me to hold metoprolol this am?    Orders Received:  -will keep him  -yes to hold metoprolol    Comments:

## 2024-12-31 LAB
BACTERIA BLD CULT: NO GROWTH
BACTERIA BLD CULT: NO GROWTH
GLUCOSE BLDC GLUCOMTR-MCNC: 109 MG/DL (ref 70–99)
GLUCOSE BLDC GLUCOMTR-MCNC: 158 MG/DL (ref 70–99)
GLUCOSE BLDC GLUCOMTR-MCNC: 189 MG/DL (ref 70–99)
GLUCOSE BLDC GLUCOMTR-MCNC: 219 MG/DL (ref 70–99)
GLUCOSE BLDC GLUCOMTR-MCNC: 96 MG/DL (ref 70–99)

## 2024-12-31 PROCEDURE — 999N000157 HC STATISTIC RCP TIME EA 10 MIN

## 2024-12-31 PROCEDURE — 250N000011 HC RX IP 250 OP 636: Performed by: INTERNAL MEDICINE

## 2024-12-31 PROCEDURE — 120N000001 HC R&B MED SURG/OB

## 2024-12-31 PROCEDURE — 99232 SBSQ HOSP IP/OBS MODERATE 35: CPT | Performed by: HOSPITALIST

## 2024-12-31 PROCEDURE — 250N000012 HC RX MED GY IP 250 OP 636 PS 637: Performed by: HOSPITALIST

## 2024-12-31 PROCEDURE — 250N000013 HC RX MED GY IP 250 OP 250 PS 637: Performed by: STUDENT IN AN ORGANIZED HEALTH CARE EDUCATION/TRAINING PROGRAM

## 2024-12-31 PROCEDURE — 94640 AIRWAY INHALATION TREATMENT: CPT | Mod: 76

## 2024-12-31 PROCEDURE — 250N000009 HC RX 250: Performed by: STUDENT IN AN ORGANIZED HEALTH CARE EDUCATION/TRAINING PROGRAM

## 2024-12-31 PROCEDURE — 250N000009 HC RX 250: Performed by: HOSPITALIST

## 2024-12-31 PROCEDURE — 94640 AIRWAY INHALATION TREATMENT: CPT

## 2024-12-31 RX ADMIN — ASPIRIN 81 MG: 81 TABLET, COATED ORAL at 10:11

## 2024-12-31 RX ADMIN — FORMOTEROL FUMARATE DIHYDRATE 20 MCG: 20 SOLUTION RESPIRATORY (INHALATION) at 19:50

## 2024-12-31 RX ADMIN — ALLOPURINOL 300 MG: 300 TABLET ORAL at 10:12

## 2024-12-31 RX ADMIN — METHENAMINE HIPPURATE 1 G: 1 TABLET ORAL at 10:11

## 2024-12-31 RX ADMIN — SENNOSIDES AND DOCUSATE SODIUM 2 TABLET: 50; 8.6 TABLET ORAL at 10:11

## 2024-12-31 RX ADMIN — IPRATROPIUM BROMIDE AND ALBUTEROL 1 PUFF: 20; 100 SPRAY, METERED RESPIRATORY (INHALATION) at 13:09

## 2024-12-31 RX ADMIN — PIPERACILLIN AND TAZOBACTAM 4.5 G: 4; .5 INJECTION, POWDER, FOR SOLUTION INTRAVENOUS at 17:47

## 2024-12-31 RX ADMIN — IPRATROPIUM BROMIDE AND ALBUTEROL 1 PUFF: 20; 100 SPRAY, METERED RESPIRATORY (INHALATION) at 10:07

## 2024-12-31 RX ADMIN — SENNOSIDES AND DOCUSATE SODIUM 2 TABLET: 50; 8.6 TABLET ORAL at 21:11

## 2024-12-31 RX ADMIN — IPRATROPIUM BROMIDE AND ALBUTEROL 1 PUFF: 20; 100 SPRAY, METERED RESPIRATORY (INHALATION) at 21:26

## 2024-12-31 RX ADMIN — INSULIN ASPART 1 UNITS: 100 INJECTION, SOLUTION INTRAVENOUS; SUBCUTANEOUS at 21:24

## 2024-12-31 RX ADMIN — PANTOPRAZOLE SODIUM 40 MG: 40 INJECTION, POWDER, FOR SOLUTION INTRAVENOUS at 17:47

## 2024-12-31 RX ADMIN — METHENAMINE HIPPURATE 1 G: 1 TABLET ORAL at 21:11

## 2024-12-31 RX ADMIN — METOPROLOL TARTRATE 12.5 MG: 25 TABLET, FILM COATED ORAL at 10:11

## 2024-12-31 RX ADMIN — METOPROLOL TARTRATE 12.5 MG: 25 TABLET, FILM COATED ORAL at 21:11

## 2024-12-31 RX ADMIN — SERTRALINE HYDROCHLORIDE 100 MG: 100 TABLET ORAL at 10:11

## 2024-12-31 RX ADMIN — IPRATROPIUM BROMIDE AND ALBUTEROL 1 PUFF: 20; 100 SPRAY, METERED RESPIRATORY (INHALATION) at 17:52

## 2024-12-31 RX ADMIN — PIPERACILLIN AND TAZOBACTAM 4.5 G: 4; .5 INJECTION, POWDER, FOR SOLUTION INTRAVENOUS at 10:12

## 2024-12-31 RX ADMIN — ATORVASTATIN CALCIUM 10 MG: 10 TABLET, FILM COATED ORAL at 10:11

## 2024-12-31 RX ADMIN — PIPERACILLIN AND TAZOBACTAM 4.5 G: 4; .5 INJECTION, POWDER, FOR SOLUTION INTRAVENOUS at 22:58

## 2024-12-31 RX ADMIN — DEXTRAN 70, GLYCERIN, HYPROMELLOSE 2 DROP: 1; 2; 3 SOLUTION/ DROPS OPHTHALMIC at 10:10

## 2024-12-31 RX ADMIN — PIPERACILLIN AND TAZOBACTAM 4.5 G: 4; .5 INJECTION, POWDER, FOR SOLUTION INTRAVENOUS at 04:30

## 2024-12-31 RX ADMIN — DEXTRAN 70, GLYCERIN, HYPROMELLOSE 2 DROP: 1; 2; 3 SOLUTION/ DROPS OPHTHALMIC at 21:21

## 2024-12-31 RX ADMIN — FORMOTEROL FUMARATE DIHYDRATE 20 MCG: 20 SOLUTION RESPIRATORY (INHALATION) at 07:59

## 2024-12-31 RX ADMIN — PREDNISONE 40 MG: 20 TABLET ORAL at 10:11

## 2024-12-31 RX ADMIN — FUROSEMIDE 20 MG: 20 TABLET ORAL at 10:11

## 2024-12-31 ASSESSMENT — ACTIVITIES OF DAILY LIVING (ADL)
ADLS_ACUITY_SCORE: 76
ADLS_ACUITY_SCORE: 86
ADLS_ACUITY_SCORE: 86
ADLS_ACUITY_SCORE: 76
ADLS_ACUITY_SCORE: 86
ADLS_ACUITY_SCORE: 76
ADLS_ACUITY_SCORE: 76
ADLS_ACUITY_SCORE: 78
ADLS_ACUITY_SCORE: 76
ADLS_ACUITY_SCORE: 86
ADLS_ACUITY_SCORE: 76
ADLS_ACUITY_SCORE: 76
ADLS_ACUITY_SCORE: 78
ADLS_ACUITY_SCORE: 76
ADLS_ACUITY_SCORE: 86
ADLS_ACUITY_SCORE: 76
ADLS_ACUITY_SCORE: 86
ADLS_ACUITY_SCORE: 78
ADLS_ACUITY_SCORE: 86
ADLS_ACUITY_SCORE: 76

## 2024-12-31 NOTE — CODE/RAPID RESPONSE
[facility]    RRT Note  12/30/2024   Time Called: 518    RRT called for: alterned mental status    HPI:    Ron Gilmore is a 84 year old male w/ PMH of  Stroke with left-sided deficit, hypertension, hyperlipidemia, oropharyngeal dysphagia, T2DM, BRAD, chronic hypoxic respiratory failure on 2 L O2 chronically, COPD,  who was admitted on 12/26/2024 for sepsis from suspected CAUTI which he is on Zosyn for urine cultures growing Pseudomonas and Enterococcus.  He is also on high-dose prednisone for AECOPD.      RRT activated for Altered mental status.  RN reported previously he had been oriented x 4 but now he falls asleep easily and at one point eyes appear to be rolling back.  patient also subjectively endorses confusion. On my arrival he wakes easily to verbal stimuli is able to follow commands to briskly show thumbs up on the right, her rate is 70, /67, SpO2 94% on 2 L nasal cannula, BP recent temp 98.1 orally.  Patient is largely without complaints but does endorse poor sleep    Assessment & Plan   Encephalopathy suspect metabolic type in the setting of acute infectious process and new environment, recent steroids contributing to poor sleep  Differential diagnosis:    INTERVENTIONS:  -Glucose 184 mg/dL  - Stat ABG  -I have asked ICU lying squad RN to perform as thorough a neurologic exam as is feasible given his prior left-sided deficits.  But generally appears nonfocal noting pre-existing left hemiplegia  -Stat CMP, CBC to ensure no new organ failure contributing to his encephalopathy    Last 24H PRN:     acetaminophen (TYLENOL) tablet 650 mg, 650 mg at 12/30/24 1333 **OR** acetaminophen (TYLENOL) Suppository 650 mg    sodium chloride (PF) 0.9% PF flush 3 mL, 3 mL at 12/30/24 9635    Working diagnosis: Suspect hypoactive delirium in the setting of the above outlined infection environment medications  -if any recurrence of eyes rolling back and/or seizure like activity can obtain  neuro consult and eeg but didn't appear toxic or post ictal during my eval    disposition continue current level of care    Code Status: No CPR- Do NOT Intubate    Physical Exam   Vital Signs with Ranges:  Temp:  [97.4  F (36.3  C)-102.1  F (38.9  C)] 97.4  F (36.3  C)  Pulse:  [69-95] 69  Resp:  [16-18] 16  BP: (100-150)/(54-76) 100/67  SpO2:  [89 %-95 %] 93 %  I/O last 3 completed shifts:  In: 480 [P.O.:480]  Out: 1550 [Urine:1550]      Constitutional: vs as above and/or per EMR  General: Fatigued appearing elderly man lying in bed without acute distress   GCS:   Motor 6=Obeys commands   Verbal 4=Confused   Eye Opening 3=To speech   Total: 13     Neuro: +follows commands briskly to wiggle toes and show was up on right, left-sided weakness bilat, face symmetric, tongue midline, speech is not verbose but he is able to answer my questions  Head, ENT & mouth: NC/AT,  mouth moist oral mucosa  Neck: supple  CV S1S2 no murmurs  resp: CTAB upper  lobes  gi:normoactive bowel sounds, soft, nontender, nondisteded  Ext: no mottling  Skin: no rashes on exposed skin  Musculoskeletal no bony joint deformities      Data     ABG:  -  Recent Labs   Lab 12/30/24  1735   PH 7.43   PCO2 48*   PO2 59*   HCO3 32*   O2PER 29     CBC with Diff:  Recent Labs   Lab Test 12/28/24  0731 12/26/24  1540 10/29/24  0954   WBC 5.0   < > 8.5   HGB 10.0*   < > 10.7*   MCV 87   < > 87   *   < > 224   INR  --   --  1.10    < > = values in this interval not displayed.      Comprehensive Metabolic Panel:  Recent Labs   Lab 12/30/24  1710 12/29/24  0818 12/29/24  0607 12/26/24  2303 12/26/24  1540   NA  --   --  140   < > 138   POTASSIUM  --   --  5.0   < > 4.2   CHLORIDE  --   --  104   < > 100   CO2  --   --  30*   < > 31*   ANIONGAP  --   --  6*   < > 7   *   < > 112*   < > 152*   BUN  --   --  26.9*   < > 19.7   CR  --   --  0.97   < > 1.05   GFRESTIMATED  --   --  78   < > 71   RAJ  --   --  8.7*   < > 8.9   PROTTOTAL  --   --   --    --  7.4   ALBUMIN  --   --   --   --  3.5   BILITOTAL  --   --   --   --  0.3   ALKPHOS  --   --   --   --  77   AST  --   --   --   --  19   ALT  --   --   --   --  15    < > = values in this interval not displayed.       Time Spent on this Encounter   I spent 10 minutes on the unit/floor managing the care of Ron Gilmore. Over 50% of my time was spent counseling the patient and/or coordinating care regarding services listed in this note.    ELIZABETH Payton [facility]  Hospitalist Service  [facility]  Securely message with [employer] (more info)  Text page via [employer] Paging/Directory

## 2024-12-31 NOTE — PLAN OF CARE
Goal Outcome Evaluation:    Summary: Sepsis likely due to CAUTI in chronic chirinos as well as possible bronchitis   DATE & TIME: 12/30/24, [id number]  Cognitive Concerns/ Orientation: Somnolent, confused at start of shift- see RRT not. A&Ox3, at end of shift. Forgetful, very pleasant, [place].   BEHAVIOR & AGGRESSION TOOL COLOR: Green    ABNL VS/O2: VSS on 2 L NC-baseline, sats low to mid 90s.   MOBILITY: Assist-2, lift; Turn/repo Q2h. L sided weakness from previous CVA   PAIN MANAGMENT: Denies   DIET: Pureed diet (level 4), mild thick liquids--assist with feed; Pills whole one at a time in applesauce; SLP saw patient today- per note continues to be at risk for aspiration. Continue current diet with change to mildly thickend. May return to moderately thick if coughing. Poor appetite   BOWEL/BLADDER: Chronic chirinos, No BM this shift.   ABNL LAB/BG: , 202  DRAIN/DEVICES: R PIV saline locked, IV Zosyn (Q6hrs)  SKIN: Peeling to sacrum/coccyx mepilex in place, redness to scrotum, and scattered bruising, Peeling heels, clammy skin. 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   TESTS/PROCEDURES: N/A   D/C DAY/GOALS/PLACE: Pending improvement, back to nursing home, possibly 12/31  OTHER IMPORTANT INFO: Contact precaution for VRE, LS coarse, infrequent congested cough-Scheduled nebs, inhalers; ID/SLP/SW following; switch to oral antibiotics upon discharge.

## 2024-12-31 NOTE — PLAN OF CARE
Goal Outcome Evaluation:      Plan of Care Reviewed With: patient    Overall Patient Progress: no changeOverall Patient Progress: no change    Outcome Evaluation: Patient's BS well managed. Little appetite, is not happy with Pureed and Mildly Thick diet.    Shift: [id number] 12/31/2024  Summary: Sepsis likely due to CAUTI in chronic chirinos as well as possible bronchitis     Orientation: AO x 2-3; Intermittent confusion; [place]   BEHAVIOR & AGGRESSION TOOL COLOR: Green  Vitals/Tele: VSS on 2 L NC-baseline, sats low to mid 90s.   PAIN MANAGMENT: Denies   IV Access/drains: PIV SL; q6h Zosyn   Diet: Pureed diet (level 4), mild thick liquids--assist with feed; Pills whole one at a time in applesauce; SLP saw patient 12/31- per note continues to be at risk for aspiration. Continue current diet with change to mildly thickend. May return to moderately thick if coughing. Poor appetite. Does not Like Pureed Diet and is unhappy with it   Mobility: Assist-2, lift; Turn/repo Q2h. L sided weakness from previous CVA   GI/: Chronic chirinos, No BM this shift.   Wound/Skin: Peeling to sacrum/coccyx mepilex in place, redness to scrotum, and scattered bruising, Peeling heels, clammy skin. 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   Consults: None today   Discharge Plan: Pending improvement, back to [facility], possibly today  OTHER IMPORTANT INFO: Contact precaution for VRE, LS coarse, infrequent congested cough-Scheduled nebs, inhalers; ID/SLP/SW following; switch to oral antibiotics upon discharge. Prefers Pills Whole in Applesauce     See Flow sheets for assessment

## 2024-12-31 NOTE — PLAN OF CARE
Summary: Sepsis likely due to CAUTI in chronic chirinos as well as possible bronchitis   DATE & TIME: 12/30/24- 12/31/24, 8913-6698  Cognitive Concerns/ Orientation: Somnolent, confused at start of shift- see RRT not. A&Ox3, at end of shift. Forgetful, very pleasant, [place].   BEHAVIOR & AGGRESSION TOOL COLOR: Green    ABNL VS/O2: VSS on 2 L NC-baseline, sats low to mid 90s.   MOBILITY: Assist-2, lift; Turn/repo Q2h. L sided weakness from previous CVA   PAIN MANAGMENT: Denies   DIET: Pureed diet (level 4), mild thick liquids--assist with feed; Pills whole one at a time in applesauce; SLP saw patient 12/31- per note continues to be at risk for aspiration. Continue current diet with change to mildly thickend. May return to moderately thick if coughing. Poor appetite   BOWEL/BLADDER: Chronic chirinos, No BM this shift.   ABNL LAB/BG: See results  DRAIN/DEVICES: R PIV saline locked, IV Zosyn (Q6hrs), Chronic chirinos for retention - good urine out put  SKIN: Peeling to sacrum/coccyx mepilex in place, redness to scrotum, and scattered bruising, Peeling heels, clammy skin. 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   TESTS/PROCEDURES: N/A   D/C DAY/GOALS/PLACE: Pending improvement, back to [facility], possibly today  OTHER IMPORTANT INFO: Contact precaution for VRE, LS coarse, infrequent congested cough-Scheduled nebs, inhalers; ID/SLP/SW following; switch to oral antibiotics upon discharge.

## 2024-12-31 NOTE — PROGRESS NOTES
House [facility] brief RRT follow up:    Acute on chronic hypoxic respiratory failure suspect multifactorial 2/2 COPD exacerbation in setting of bronchitis vs PNA, sepsis, atelectasis, mild pulmonary edema vs emphysema.  Acute encephalopathy suspect 2/2 metabolic, sepsis.  Thrombocytopenia possibly 2/2 sepsis.    Received sign out from colleague, ELIZABETH Blank, CNP, and was asked to follow up on pending ABG ordered during recent RRT.  Please refer to Norma's initial RRT note for further details; acute encephalopathy suspected due to hypoactive delirium in setting of urosepsis, no obvious focal neuro deficits noted during RRT (has chronic L sided hemiplegia).      Contacted by nursing at 1848 noting ABG resulted, inquiring if any additional measures deemed necessary, including HFNC.  Noted pt's O2 sats remain > 90% on PTA 2L O2 via NC.  Nursing notes pt with no increased work of breathing.  At this time, continue pt on continuous pulse oximetry and adjust O2 based on O2 sat maintaining > 90%.  Continue with previously outlined plan of care per hospitalist and regulo [place] [facility].         Recent Labs   Lab 12/30/24  1735   PH 7.43   PO2 59*   PCO2 48*   HCO3 32*   MIHAELA 6.4*     Recent Labs   Lab 12/30/24  1933 12/28/24  0731 12/27/24  0739   WBC 4.0 5.0 6.4   HGB 10.5* 10.0* 9.7*   HCT 34.8* 34.8* 33.7*   MCV 83 87 85   * 119* 166     Recent Labs   Lab 12/30/24  1933 12/30/24  1710 12/30/24  1144 12/29/24  0818 12/29/24  0607 12/28/24  0831 12/28/24  0731 12/26/24  2303 12/26/24  1540     --   --   --  140  --  139   < > 138   POTASSIUM 4.3  --   --   --  5.0  --  4.4   < > 4.2   CHLORIDE 100  --   --   --  104  --  106   < > 100   CO2 30*  --   --   --  30*  --  26   < > 31*   ANIONGAP 8  --   --   --  6*  --  7   < > 7   * 184* 144*   < > 112*   < > 88   < > 152*   BUN 22.7  --   --   --  26.9*  --  23.6*   < > 19.7   CR 1.07  --   --   --  0.97  --  1.03   < > 1.05   GFRESTIMATED 69  --   --   --  78   --  73   < > 71   RAJ 8.6*  --   --   --  8.7*  --  8.1*   < > 8.9   PROTTOTAL 7.1  --   --   --   --   --   --   --  7.4   ALBUMIN 3.1*  --   --   --   --   --   --   --  3.5   BILITOTAL 0.4  --   --   --   --   --   --   --  0.3   ALKPHOS 67  --   --   --   --   --   --   --  77   AST 36  --   --   --   --   --   --   --  19   ALT 23  --   --   --   --   --   --   --  15    < > = values in this interval not displayed.     ELIZABETH Brown, CNP  Hospitalist-House [facility]  Hospitalist Service  Securely message with [employer] (more info)  Text page via [facility] Paging/Directory     Medical Decision Making       10 MINUTES SPENT BY ME on the date of service doing chart review, history, exam, documentation & further activities per the note.

## 2024-12-31 NOTE — PROGRESS NOTES
[facility]    Medicine Progress Note - Hospitalist Service    Date of Admission:  12/26/2024    Assessment & Plan   Ron Gilmore is a 84 year old male admitted on 12/26/2024. He is admitted with sepsis likely due to CAUTI in chronic chirinos as well as possible bronchitis.      Sepsis (fever, tachycardia, UTI)  CAUTI, suspected  Chronic chirinos, POA  UA appears infeccted but bladder is chronically cathed. Has suprapubic tenderness on admission. Chirinos changed on admission 12/26  CT with possible bronchitis. Patient has dry, non-productive cough  UCX shows pseudomonas and enterococcus.  BCX NTD.  Was afebrile for 2 days but had fever of 102 on 12/30  Initially on cefepime-->zosyn per ID (coverage for both bacteria).  - 12/30 BCx NG at 12h  - Continue zosyn.  - ID recommends discharge on levaquin and amoxicillin to finish course  - discharge held on 12/30 due to fever and AMS late in day, monitored on 12/31 to monitor if afternoon/evening recurrence  - if has increased O2 needs (one time 5LPM requirement noted at end of day) persist into 1/1 would repeat chest imaging     Hx of CVA with residual left sided deficits  HTN  HLD  -continue ASA, statin, lasix, metoprolol     Oropharyngeal dysphagia  - Diet per SLP.     DMT2  Not on PTA meds. Glucose elevated on admission  - med dose sliding scale     BRAD  Acute on Chronic Respiratory failure with hypoxia,  on 2L O2 at baseline, 3L on admission   COPD with acute exacerbation  PNA vs bronchitis.  CXR on 12/28 shows atalectasis vs PNA. CO2 at baseline on VBG.  - Azithromycin x 3 days, completed 12/30  - Cont Prednisone for 5 days, last dose 1/1  - Does not tolerate bipap  - Continue PTA nebs     BPH  Depression  Anxiety  - resume PTA meds.     GERD  - PPI     Gout  - continue allopurinol      Mediastinal and hilar adenopathy  - Consider repeat CT in 6 months          Diet: Diet  Combination Diet Regular Diet Adult; Pureed Diet (level 4); Mildly Thick  (level 2) (Upright, assistance with feeding, no straws, small bites/sips and alternate liquids/solids. If increased coughing noted with mildly thick return to moderately thic...    DVT Prophylaxis: Pneumatic Compression Devices  Cardona Catheter: PRESENT, indication: Other (Comment) (Chronic)  Lines: None     Cardiac Monitoring: None  Code Status: No CPR- Do NOT Intubate      Clinically Significant Risk Factors               # Hypoalbuminemia: Lowest albumin = 3.1 g/dL at 12/30/2024  7:33 PM, will monitor as appropriate   # Thrombocytopenia: Lowest platelets = 123 in last 2 days, will monitor for bleeding       # Acute Hypoxic Respiratory Failure: Documented O2 saturation < 90%. Continue supplemental oxygen as needed         # Obesity: Estimated body mass index is 31.1 kg/m  as calculated from the following:    Height as of this encounter: 1.829 m (6').    Weight as of this encounter: 104 kg (229 lb 4.5 oz).      # Financial/Environmental Concerns: none  # COPD: noted on problem list        Social Drivers of Health    Tobacco Use: Medium Risk (7/15/2024)    Patient History     Smoking Tobacco Use: Former     Smokeless Tobacco Use: Never          Disposition Plan     Medically Ready for Discharge: Anticipated in 2-4 Days  Unable to discharge back to facility on 1/1 due to holiday. Ongoing monitoring.           Daniella Alexander DO  Hospitalist Service  [facility]  Securely message with [employer] (more info)  Text page via [employer] Paging/Directory   ______________________________________________________________________    Interval History   Patient seen and examined. He is okay. Less confused today, but still tells me he isn't sure what is going on. We discussed his fever day before and change in mental status that was concerning. Given his facility can't accept him after 3pm today or on 1/1, we will continue to monitor for progressive symptoms. He will be completing his course of steroids on 1/1. So  far no further fevers, but he did briefly require increased O2 in early afternoon. No other new symptoms per patient, no change in cough or production with cough. No discomfort or burning sensation with catheter.    Attempted to call to López (stepdaughter), no answer.    Physical Exam   Vital Signs: Temp: 98.4  F (36.9  C) Temp src: Oral BP: (!) 153/78 Pulse: 82   Resp: 18 SpO2: 91 % O2 Device: Nasal cannula Oxygen Delivery: 2 LPM  Weight: 229 lbs 4.45 oz    Constitutional: Awake, alert, cooperative, no apparent distress. Little hard of hearing.  Respiratory: Clear to auscultation bilaterally, no crackles or wheezing on 2LPM O2 with sats around 90%  Cardiovascular: Regular rate and rhythm, normal S1 and S2, and no murmur noted  GI: Normal bowel sounds, soft, non-distended, non-tender  Skin/Integumen: No rashes, no cyanosis, no edema  Other:  Cardona with light yellow urine    Medical Decision Making       35 MINUTES SPENT BY ME on the date of service doing chart review, history, exam, documentation & further activities per the note.      Data     I have personally reviewed the following data over the past 24 hrs:    4.0  \   10.5 (L)   / 123 (L)     138 100 22.7 /  109 (H)   4.3 30 (H) 1.07 \     ALT: 23 AST: 36 AP: 67 TBILI: 0.4   ALB: 3.1 (L) TOT PROTEIN: 7.1 LIPASE: N/A       Imaging results reviewed over the past 24 hrs:   No results found for this or any previous visit (from the past 24 hours).

## 2025-01-01 LAB
ANION GAP SERPL CALCULATED.3IONS-SCNC: 9 MMOL/L (ref 7–15)
BASE EXCESS BLDV CALC-SCNC: 10.1 MMOL/L (ref -3–3)
BUN SERPL-MCNC: 26.5 MG/DL (ref 8–23)
CALCIUM SERPL-MCNC: 8.9 MG/DL (ref 8.8–10.4)
CHLORIDE SERPL-SCNC: 103 MMOL/L (ref 98–107)
CREAT SERPL-MCNC: 1.05 MG/DL (ref 0.67–1.17)
EGFRCR SERPLBLD CKD-EPI 2021: 71 ML/MIN/1.73M2
ERYTHROCYTE [DISTWIDTH] IN BLOOD BY AUTOMATED COUNT: 20.5 % (ref 10–15)
GLUCOSE BLDC GLUCOMTR-MCNC: 124 MG/DL (ref 70–99)
GLUCOSE BLDC GLUCOMTR-MCNC: 132 MG/DL (ref 70–99)
GLUCOSE BLDC GLUCOMTR-MCNC: 165 MG/DL (ref 70–99)
GLUCOSE BLDC GLUCOMTR-MCNC: 196 MG/DL (ref 70–99)
GLUCOSE SERPL-MCNC: 96 MG/DL (ref 70–99)
HCO3 BLDV-SCNC: 37 MMOL/L (ref 21–28)
HCO3 SERPL-SCNC: 33 MMOL/L (ref 22–29)
HCT VFR BLD AUTO: 38.4 % (ref 40–53)
HGB BLD-MCNC: 11.2 G/DL (ref 13.3–17.7)
MAGNESIUM SERPL-MCNC: 2.2 MG/DL (ref 1.7–2.3)
MCH RBC QN AUTO: 24.7 PG (ref 26.5–33)
MCHC RBC AUTO-ENTMCNC: 29.2 G/DL (ref 31.5–36.5)
MCV RBC AUTO: 85 FL (ref 78–100)
O2/TOTAL GAS SETTING VFR VENT: 3 %
OXYHGB MFR BLDV: 38 % (ref 70–75)
PCO2 BLDV: 64 MM HG (ref 40–50)
PH BLDV: 7.38 [PH] (ref 7.32–7.43)
PHOSPHATE SERPL-MCNC: 3.2 MG/DL (ref 2.5–4.5)
PLATELET # BLD AUTO: 133 10E3/UL (ref 150–450)
PO2 BLDV: 26 MM HG (ref 25–47)
POTASSIUM SERPL-SCNC: 3.8 MMOL/L (ref 3.4–5.3)
RBC # BLD AUTO: 4.53 10E6/UL (ref 4.4–5.9)
SAO2 % BLDV: 38.3 % (ref 70–75)
SODIUM SERPL-SCNC: 145 MMOL/L (ref 135–145)
WBC # BLD AUTO: 5.1 10E3/UL (ref 4–11)

## 2025-01-01 PROCEDURE — 82565 ASSAY OF CREATININE: CPT | Performed by: HOSPITALIST

## 2025-01-01 PROCEDURE — 82310 ASSAY OF CALCIUM: CPT | Performed by: HOSPITALIST

## 2025-01-01 PROCEDURE — 85027 COMPLETE CBC AUTOMATED: CPT | Performed by: HOSPITALIST

## 2025-01-01 PROCEDURE — 999N000157 HC STATISTIC RCP TIME EA 10 MIN

## 2025-01-01 PROCEDURE — 94640 AIRWAY INHALATION TREATMENT: CPT

## 2025-01-01 PROCEDURE — 99232 SBSQ HOSP IP/OBS MODERATE 35: CPT | Performed by: INTERNAL MEDICINE

## 2025-01-01 PROCEDURE — 120N000001 HC R&B MED SURG/OB

## 2025-01-01 PROCEDURE — 250N000009 HC RX 250: Performed by: HOSPITALIST

## 2025-01-01 PROCEDURE — 83735 ASSAY OF MAGNESIUM: CPT | Performed by: HOSPITALIST

## 2025-01-01 PROCEDURE — 36415 COLL VENOUS BLD VENIPUNCTURE: CPT | Performed by: HOSPITALIST

## 2025-01-01 PROCEDURE — 84100 ASSAY OF PHOSPHORUS: CPT | Performed by: HOSPITALIST

## 2025-01-01 PROCEDURE — 94640 AIRWAY INHALATION TREATMENT: CPT | Mod: 76

## 2025-01-01 PROCEDURE — 82805 BLOOD GASES W/O2 SATURATION: CPT | Performed by: HOSPITALIST

## 2025-01-01 PROCEDURE — 250N000011 HC RX IP 250 OP 636: Performed by: INTERNAL MEDICINE

## 2025-01-01 PROCEDURE — 250N000009 HC RX 250: Performed by: STUDENT IN AN ORGANIZED HEALTH CARE EDUCATION/TRAINING PROGRAM

## 2025-01-01 PROCEDURE — 250N000012 HC RX MED GY IP 250 OP 636 PS 637: Performed by: HOSPITALIST

## 2025-01-01 PROCEDURE — 250N000013 HC RX MED GY IP 250 OP 250 PS 637: Performed by: STUDENT IN AN ORGANIZED HEALTH CARE EDUCATION/TRAINING PROGRAM

## 2025-01-01 PROCEDURE — 80048 BASIC METABOLIC PNL TOTAL CA: CPT | Performed by: HOSPITALIST

## 2025-01-01 RX ADMIN — PANTOPRAZOLE SODIUM 40 MG: 40 INJECTION, POWDER, FOR SOLUTION INTRAVENOUS at 17:21

## 2025-01-01 RX ADMIN — METOPROLOL TARTRATE 12.5 MG: 25 TABLET, FILM COATED ORAL at 08:30

## 2025-01-01 RX ADMIN — PANTOPRAZOLE SODIUM 40 MG: 40 INJECTION, POWDER, FOR SOLUTION INTRAVENOUS at 06:14

## 2025-01-01 RX ADMIN — FORMOTEROL FUMARATE DIHYDRATE 20 MCG: 20 SOLUTION RESPIRATORY (INHALATION) at 19:49

## 2025-01-01 RX ADMIN — IPRATROPIUM BROMIDE AND ALBUTEROL 1 PUFF: 20; 100 SPRAY, METERED RESPIRATORY (INHALATION) at 17:21

## 2025-01-01 RX ADMIN — ALLOPURINOL 300 MG: 300 TABLET ORAL at 08:30

## 2025-01-01 RX ADMIN — METOPROLOL TARTRATE 12.5 MG: 25 TABLET, FILM COATED ORAL at 21:40

## 2025-01-01 RX ADMIN — ASPIRIN 81 MG: 81 TABLET, COATED ORAL at 08:31

## 2025-01-01 RX ADMIN — IPRATROPIUM BROMIDE AND ALBUTEROL 1 PUFF: 20; 100 SPRAY, METERED RESPIRATORY (INHALATION) at 21:43

## 2025-01-01 RX ADMIN — PIPERACILLIN AND TAZOBACTAM 4.5 G: 4; .5 INJECTION, POWDER, FOR SOLUTION INTRAVENOUS at 10:02

## 2025-01-01 RX ADMIN — PIPERACILLIN AND TAZOBACTAM 4.5 G: 4; .5 INJECTION, POWDER, FOR SOLUTION INTRAVENOUS at 17:21

## 2025-01-01 RX ADMIN — IPRATROPIUM BROMIDE AND ALBUTEROL 1 PUFF: 20; 100 SPRAY, METERED RESPIRATORY (INHALATION) at 13:32

## 2025-01-01 RX ADMIN — DEXTRAN 70, GLYCERIN, HYPROMELLOSE 2 DROP: 1; 2; 3 SOLUTION/ DROPS OPHTHALMIC at 21:40

## 2025-01-01 RX ADMIN — ATORVASTATIN CALCIUM 10 MG: 10 TABLET, FILM COATED ORAL at 08:30

## 2025-01-01 RX ADMIN — DEXTRAN 70, GLYCERIN, HYPROMELLOSE 2 DROP: 1; 2; 3 SOLUTION/ DROPS OPHTHALMIC at 08:34

## 2025-01-01 RX ADMIN — METHENAMINE HIPPURATE 1 G: 1 TABLET ORAL at 08:33

## 2025-01-01 RX ADMIN — PIPERACILLIN AND TAZOBACTAM 4.5 G: 4; .5 INJECTION, POWDER, FOR SOLUTION INTRAVENOUS at 04:54

## 2025-01-01 RX ADMIN — FORMOTEROL FUMARATE DIHYDRATE 20 MCG: 20 SOLUTION RESPIRATORY (INHALATION) at 08:05

## 2025-01-01 RX ADMIN — IPRATROPIUM BROMIDE AND ALBUTEROL 1 PUFF: 20; 100 SPRAY, METERED RESPIRATORY (INHALATION) at 08:33

## 2025-01-01 RX ADMIN — FUROSEMIDE 20 MG: 20 TABLET ORAL at 08:31

## 2025-01-01 RX ADMIN — PIPERACILLIN AND TAZOBACTAM 4.5 G: 4; .5 INJECTION, POWDER, FOR SOLUTION INTRAVENOUS at 21:43

## 2025-01-01 RX ADMIN — SERTRALINE HYDROCHLORIDE 100 MG: 100 TABLET ORAL at 08:30

## 2025-01-01 RX ADMIN — PREDNISONE 40 MG: 20 TABLET ORAL at 08:30

## 2025-01-01 RX ADMIN — METHENAMINE HIPPURATE 1 G: 1 TABLET ORAL at 21:42

## 2025-01-01 ASSESSMENT — ACTIVITIES OF DAILY LIVING (ADL)
ADLS_ACUITY_SCORE: 82
ADLS_ACUITY_SCORE: 82
ADLS_ACUITY_SCORE: 88
ADLS_ACUITY_SCORE: 82
ADLS_ACUITY_SCORE: 86
ADLS_ACUITY_SCORE: 88
ADLS_ACUITY_SCORE: 82
ADLS_ACUITY_SCORE: 82
ADLS_ACUITY_SCORE: 86
ADLS_ACUITY_SCORE: 82
ADLS_ACUITY_SCORE: 82
ADLS_ACUITY_SCORE: 86
ADLS_ACUITY_SCORE: 82
ADLS_ACUITY_SCORE: 82
ADLS_ACUITY_SCORE: 86
ADLS_ACUITY_SCORE: 82
ADLS_ACUITY_SCORE: 88
ADLS_ACUITY_SCORE: 82
ADLS_ACUITY_SCORE: 86
ADLS_ACUITY_SCORE: 82
ADLS_ACUITY_SCORE: 86

## 2025-01-01 NOTE — PROGRESS NOTES
[facility]    Medicine Progress Note - Hospitalist Service    Date of Admission:  12/26/2024    Assessment & Plan   Ron Gilmore is a 84 year old male admitted on 12/26/2024. He is admitted with sepsis likely due to CAUTI in chronic chirinos as well as possible bronchitis.      Sepsis (fever, tachycardia, UTI)  CAUTI, suspected  Chronic chirinos, POA  UA appears infeccted but bladder is chronically cathed. Has suprapubic tenderness on admission. Chirinos changed on admission 12/26  CT with possible bronchitis. Patient has dry, non-productive cough  UCX shows pseudomonas and enterococcus.  BCX NTD.  Was afebrile for 2 days but had fever of 102 on 12/30  Initially on cefepime-->zosyn per ID (coverage for both bacteria).  - 12/30 BCx NG at 12h  - Continue zosyn.  - ID recommends discharge on levaquin and amoxicillin to finish course  - discharge held on 12/30 due to fever and AMS late in day, monitored on 12/31 to monitor if afternoon/evening recurrence     Hx of CVA with residual left sided deficits  HTN  HLD  -continue ASA, statin, lasix, metoprolol     Oropharyngeal dysphagia  - Diet per SLP.     DMT2  Not on PTA meds. Glucose elevated on admission  - med dose sliding scale     BRAD  Acute on Chronic Respiratory failure with hypoxia,  on 2L O2 at baseline, 3L on admission   COPD with acute exacerbation  PNA vs bronchitis.  CXR on 12/28 shows atalectasis vs PNA. CO2 at baseline on VBG.  - Azithromycin x 3 days, completed 12/30  - Cont Prednisone for 5 days, last dose 1/1  - Does not tolerate bipap  - Continue PTA nebs  - if has increased O2 needs (one time 5LPM requirement noted at end of day 12/31) persist into 1/2 would repeat chest imaging     BPH  Depression  Anxiety  - resume PTA meds.     GERD  - PPI     Gout  - continue allopurinol      Mediastinal and hilar adenopathy  - Consider repeat CT in 6 months          Diet: Diet  Combination Diet Regular Diet Adult; Pureed Diet (level 4); Mildly  Thick (level 2) (Upright, assistance with feeding, no straws, small bites/sips and alternate liquids/solids. If increased coughing noted with mildly thick return to moderately thic...  Room Service    DVT Prophylaxis: Pneumatic Compression Devices  Cardona Catheter: PRESENT, indication: Other (Comment), Acute retention or obstruction (Chronic)  Lines: None     Cardiac Monitoring: None  Code Status: No CPR- Do NOT Intubate      Clinically Significant Risk Factors               # Hypoalbuminemia: Lowest albumin = 3.1 g/dL at 12/30/2024  7:33 PM, will monitor as appropriate   # Thrombocytopenia: Lowest platelets = 123 in last 2 days, will monitor for bleeding       # Acute Hypoxic Respiratory Failure: Documented O2 saturation < 90%. Continue supplemental oxygen as needed         # Obesity: Estimated body mass index is 31.1 kg/m  as calculated from the following:    Height as of this encounter: 1.829 m (6').    Weight as of this encounter: 104 kg (229 lb 4.5 oz).        # Financial/Environmental Concerns: none  # COPD: noted on problem list        Social Drivers of Health    Tobacco Use: Medium Risk (7/15/2024)    Patient History     Smoking Tobacco Use: Former     Smokeless Tobacco Use: Never          Disposition Plan     Medically Ready for Discharge: Anticipated in 2-4 Days  Unable to discharge back to facility on 1/1 due to holiday. Ongoing monitoring.           Donnie Andrade MD  Hospitalist Service  [facility]  Securely message with [employer] (more info)  Text page via [employer] Paging/Directory   ______________________________________________________________________    Interval History   - I assumed care this AM  - On 2L NC this AM  - Facility unable to accept today given holiday  - Patient with no acute concerns    Physical Exam   Vital Signs: Temp: 97.6  F (36.4  C) Temp src: Oral BP: 135/67 Pulse: 65   Resp: 20 SpO2: 92 % O2 Device: Nasal cannula with humidification Oxygen Delivery: 3  LPM  Weight: 229 lbs 4.45 oz    Constitutional: Awake, alert, cooperative, no apparent distress. Little hard of hearing.  Respiratory: Clear to auscultation bilaterally, no crackles or wheezing on 3LPM O2 with sats around 92%  Cardiovascular: Regular rate and rhythm, normal S1 and S2, and no murmur noted  GI: Normal bowel sounds, soft, non-distended, non-tender  Skin/Integumen: No rashes, no cyanosis, no edema  Other:  Cardona with light yellow urine    Medical Decision Making       Data     I have personally reviewed the following data over the past 24 hrs:    5.1  \   11.2 (L)   / 133 (L)     145 103 26.5 (H) /  96   3.8 33 (H) 1.05 \       Imaging results reviewed over the past 24 hrs:   No results found for this or any previous visit (from the past 24 hours).

## 2025-01-01 NOTE — PLAN OF CARE
Shift: 12/31/2024 Evenings   Summary: Sepsis likely due to CAUTI in chronic chirinos as well as possible bronchitis     Orientation: A&Ox2-3; Intermittent confusion; [place]. Losing voice this evening.   BEHAVIOR & AGGRESSION TOOL COLOR: Green  Vitals/Tele: VSS on 2-3L NC-baseline, sats low to mid 90s. Did need to encourage deep breathing and coughing and had to bump O2 to 5L briefly. LS coarse diminished.   PAIN MANAGMENT: Denies   IV Access/drains: PIV SL; q6h Zosyn   Diet: Pureed diet (level 4), mild thick liquids--assist with feed; Pills whole one at a time in applesauce; SLP saw patient 12/31- per note continues to be at risk for aspiration. Continue current diet with change to mildly thickend. May return to moderately thick if coughing. Poor appetite. Does not Like Pureed Diet and is unhappy with it. Needs assist with feeding and ordering. Asking for whole fruit.   Mobility: 2PA, lift.  Turn/repo Q2h. LUE/LLL sided weakness from previous CVA   GI/: Chronic chirinos, No BM this shift. Scheduled Senna given.   Wound/Skin: Peeling to sacrum/coccyx mepilex in place, redness to scrotum, and scattered bruising, Peeling heels, clammy skin. 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   Consults: None today   Discharge Plan: Pending improvement, back to [facility]  OTHER IMPORTANT INFO: Contact precaution for VRE, LS coarse, infrequent congested cough-Scheduled nebs, inhalers; ID/SLP/SW following; switch to oral antibiotics upon discharge. Prefers Pills Whole in Applesauce. Bed alarm on for safety. Calls occasionally. Rounded on freq.

## 2025-01-01 NOTE — PROGRESS NOTES
Summary: Sepsis likely due to CAUTI in chronic chirinos as well as possible bronchitis   DATE & TIME: 1/1/25: 0700-1530hr  Orientation: A&Ox3; Intermittent confusion; [place]. Losing voice at times.   BEHAVIOR & AGGRESSION TOOL COLOR: Green  Vitals/Tele: VSS on 2-3L NC-baseline, sats low to mid 90s.LS coarse diminished.   PAIN MANAGMENT: Denies   IV Access/drains: PIV SL; q6h Zosyn   Diet: Pureed diet (level 4), mild thick liquids--assist with feed; Pills whole one at a time in applesauce. Poor appetite. Does not Like Pureed Diet. Needs assist with feeding. Room service.   Mobility: 2PA, lift.  Turn/repo Q2h. LUE/LLL sided weakness from previous CVA   GI/: Chronic chirinos, Loose stool this shift. Scheduled Senna given and bisacodyl supp held. .   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   Consults: None today   Discharge Plan: Ready to discharge back to correction, when the correction is ready.   OTHER IMPORTANT INFO: Contact precaution for VRE. ID/SLP/SW following; switch to oral antibiotics upon discharge.

## 2025-01-01 NOTE — PROGRESS NOTES
Summary: Sepsis likely due to CAUTI in chronic chirinos as well as possible bronchitis     Contact precaution for VRE      Orientation: A/O x3    Vitals/Tele:VSS on 2L NC    IV Access/drains: PIV SL    Diet: Puree with mildly thick Liquid    Mobility: A x2, lift, T/Repo    GI/: Incontinent of bowel, x1 large BM this shift, chronic chirinos in place.    Wound/Skin: Peeling to sacrum/coccyx mepilex in place, redness to scrotum, and scattered bruising, Peeling heels, clammy skin. Deformity L ear d/t previous skin cancer removal.     Consults: ID/SLP/SW following     Discharge Plan:  Pending improvement to [facility]        See Flow sheets for assessment

## 2025-01-02 ENCOUNTER — APPOINTMENT (OUTPATIENT)
Dept: SPEECH THERAPY | Facility: CLINIC | Age: 85
End: 2025-01-02
Payer: MEDICARE

## 2025-01-02 VITALS
BODY MASS INDEX: 31.05 KG/M2 | TEMPERATURE: 97.9 F | HEART RATE: 62 BPM | WEIGHT: 229.28 LBS | DIASTOLIC BLOOD PRESSURE: 47 MMHG | SYSTOLIC BLOOD PRESSURE: 104 MMHG | RESPIRATION RATE: 18 BRPM | HEIGHT: 72 IN | OXYGEN SATURATION: 89 %

## 2025-01-02 LAB
BACTERIA BLD CULT: NORMAL
BACTERIA BLD CULT: NORMAL
GLUCOSE BLDC GLUCOMTR-MCNC: 114 MG/DL (ref 70–99)
GLUCOSE BLDC GLUCOMTR-MCNC: 114 MG/DL (ref 70–99)
GLUCOSE BLDC GLUCOMTR-MCNC: 90 MG/DL (ref 70–99)
GLUCOSE BLDC GLUCOMTR-MCNC: 92 MG/DL (ref 70–99)
GLUCOSE BLDC GLUCOMTR-MCNC: 98 MG/DL (ref 70–99)

## 2025-01-02 PROCEDURE — 250N000013 HC RX MED GY IP 250 OP 250 PS 637: Performed by: STUDENT IN AN ORGANIZED HEALTH CARE EDUCATION/TRAINING PROGRAM

## 2025-01-02 PROCEDURE — 94640 AIRWAY INHALATION TREATMENT: CPT

## 2025-01-02 PROCEDURE — 94640 AIRWAY INHALATION TREATMENT: CPT | Mod: 76

## 2025-01-02 PROCEDURE — 250N000011 HC RX IP 250 OP 636: Performed by: INTERNAL MEDICINE

## 2025-01-02 PROCEDURE — 92526 ORAL FUNCTION THERAPY: CPT | Mod: GN | Performed by: SPEECH-LANGUAGE PATHOLOGIST

## 2025-01-02 PROCEDURE — 99233 SBSQ HOSP IP/OBS HIGH 50: CPT | Performed by: INTERNAL MEDICINE

## 2025-01-02 PROCEDURE — 250N000009 HC RX 250: Performed by: STUDENT IN AN ORGANIZED HEALTH CARE EDUCATION/TRAINING PROGRAM

## 2025-01-02 PROCEDURE — 250N000009 HC RX 250: Performed by: HOSPITALIST

## 2025-01-02 PROCEDURE — 250N000013 HC RX MED GY IP 250 OP 250 PS 637: Performed by: INTERNAL MEDICINE

## 2025-01-02 PROCEDURE — 999N000157 HC STATISTIC RCP TIME EA 10 MIN

## 2025-01-02 PROCEDURE — 120N000001 HC R&B MED SURG/OB

## 2025-01-02 RX ORDER — LEVOFLOXACIN 250 MG/1
500 TABLET, FILM COATED ORAL DAILY
Status: DISCONTINUED | OUTPATIENT
Start: 2025-01-02 | End: 2025-01-03 | Stop reason: HOSPADM

## 2025-01-02 RX ORDER — LEVOFLOXACIN 500 MG/1
500 TABLET, FILM COATED ORAL DAILY
Qty: 6 TABLET | Refills: 0 | Status: SHIPPED | OUTPATIENT
Start: 2025-01-02 | End: 2025-01-08

## 2025-01-02 RX ORDER — AMOXICILLIN 500 MG/1
500 CAPSULE ORAL EVERY 8 HOURS SCHEDULED
Status: DISCONTINUED | OUTPATIENT
Start: 2025-01-02 | End: 2025-01-03 | Stop reason: HOSPADM

## 2025-01-02 RX ORDER — PANTOPRAZOLE SODIUM 40 MG/1
40 TABLET, DELAYED RELEASE ORAL
Status: DISCONTINUED | OUTPATIENT
Start: 2025-01-03 | End: 2025-01-03 | Stop reason: HOSPADM

## 2025-01-02 RX ORDER — AMOXICILLIN 500 MG/1
500 CAPSULE ORAL EVERY 8 HOURS
Qty: 18 CAPSULE | Refills: 0 | Status: SHIPPED | OUTPATIENT
Start: 2025-01-02 | End: 2025-01-08

## 2025-01-02 RX ADMIN — ATORVASTATIN CALCIUM 10 MG: 10 TABLET, FILM COATED ORAL at 10:11

## 2025-01-02 RX ADMIN — DEXTRAN 70, GLYCERIN, HYPROMELLOSE 2 DROP: 1; 2; 3 SOLUTION/ DROPS OPHTHALMIC at 10:09

## 2025-01-02 RX ADMIN — SERTRALINE HYDROCHLORIDE 100 MG: 100 TABLET ORAL at 10:11

## 2025-01-02 RX ADMIN — ALLOPURINOL 300 MG: 300 TABLET ORAL at 10:11

## 2025-01-02 RX ADMIN — ASPIRIN 81 MG: 81 TABLET, COATED ORAL at 10:11

## 2025-01-02 RX ADMIN — PANTOPRAZOLE SODIUM 40 MG: 40 INJECTION, POWDER, FOR SOLUTION INTRAVENOUS at 06:36

## 2025-01-02 RX ADMIN — IPRATROPIUM BROMIDE AND ALBUTEROL 1 PUFF: 20; 100 SPRAY, METERED RESPIRATORY (INHALATION) at 10:10

## 2025-01-02 RX ADMIN — FORMOTEROL FUMARATE DIHYDRATE 20 MCG: 20 SOLUTION RESPIRATORY (INHALATION) at 19:49

## 2025-01-02 RX ADMIN — METOPROLOL TARTRATE 12.5 MG: 25 TABLET, FILM COATED ORAL at 10:11

## 2025-01-02 RX ADMIN — METHENAMINE HIPPURATE 1 G: 1 TABLET ORAL at 10:11

## 2025-01-02 RX ADMIN — AMOXICILLIN 500 MG: 500 CAPSULE ORAL at 13:00

## 2025-01-02 RX ADMIN — LEVOFLOXACIN 500 MG: 250 TABLET, FILM COATED ORAL at 12:59

## 2025-01-02 RX ADMIN — PIPERACILLIN AND TAZOBACTAM 4.5 G: 4; .5 INJECTION, POWDER, FOR SOLUTION INTRAVENOUS at 04:53

## 2025-01-02 RX ADMIN — DEXTRAN 70, GLYCERIN, HYPROMELLOSE 2 DROP: 1; 2; 3 SOLUTION/ DROPS OPHTHALMIC at 21:42

## 2025-01-02 RX ADMIN — METHENAMINE HIPPURATE 1 G: 1 TABLET ORAL at 21:42

## 2025-01-02 RX ADMIN — FORMOTEROL FUMARATE DIHYDRATE 20 MCG: 20 SOLUTION RESPIRATORY (INHALATION) at 08:11

## 2025-01-02 RX ADMIN — FUROSEMIDE 20 MG: 20 TABLET ORAL at 10:11

## 2025-01-02 RX ADMIN — PIPERACILLIN AND TAZOBACTAM 4.5 G: 4; .5 INJECTION, POWDER, FOR SOLUTION INTRAVENOUS at 10:08

## 2025-01-02 RX ADMIN — IPRATROPIUM BROMIDE AND ALBUTEROL 1 PUFF: 20; 100 SPRAY, METERED RESPIRATORY (INHALATION) at 12:59

## 2025-01-02 RX ADMIN — IPRATROPIUM BROMIDE AND ALBUTEROL 1 PUFF: 20; 100 SPRAY, METERED RESPIRATORY (INHALATION) at 21:44

## 2025-01-02 RX ADMIN — METOPROLOL TARTRATE 12.5 MG: 25 TABLET, FILM COATED ORAL at 21:34

## 2025-01-02 RX ADMIN — AMOXICILLIN 500 MG: 500 CAPSULE ORAL at 21:42

## 2025-01-02 RX ADMIN — IPRATROPIUM BROMIDE AND ALBUTEROL 1 PUFF: 20; 100 SPRAY, METERED RESPIRATORY (INHALATION) at 19:37

## 2025-01-02 ASSESSMENT — ACTIVITIES OF DAILY LIVING (ADL)
ADLS_ACUITY_SCORE: 86
ADLS_ACUITY_SCORE: 84
ADLS_ACUITY_SCORE: 86
ADLS_ACUITY_SCORE: 86
ADLS_ACUITY_SCORE: 84
ADLS_ACUITY_SCORE: 84
ADLS_ACUITY_SCORE: 86
ADLS_ACUITY_SCORE: 84
ADLS_ACUITY_SCORE: 84
ADLS_ACUITY_SCORE: 86
ADLS_ACUITY_SCORE: 84
ADLS_ACUITY_SCORE: 84
ADLS_ACUITY_SCORE: 86
ADLS_ACUITY_SCORE: 84
ADLS_ACUITY_SCORE: 86
ADLS_ACUITY_SCORE: 86
ADLS_ACUITY_SCORE: 84
ADLS_ACUITY_SCORE: 86
ADLS_ACUITY_SCORE: 86
ADLS_ACUITY_SCORE: 84
ADLS_ACUITY_SCORE: 84

## 2025-01-02 NOTE — PROGRESS NOTES
Care Management Follow Up    Length of Stay (days): 7    Expected Discharge Date: 01/03/25     Concerns to be Addressed: discharge planning  will need transportation set-up  Patient plan of care discussed at interdisciplinary rounds: Yes    Anticipated Discharge Disposition: Assisted Living        Anticipated Discharge Services: Transportation Services  Anticipated Discharge DME: Oxygen (resume)    Patient/family educated on Medicare website which has current facility and service quality ratings:  NA  Education Provided on the Discharge Plan: Yes  Patient/Family in Agreement with the Plan: yes    Referrals Placed by CM/SW: External Care Coordination, [facility] ([facility])  Private pay costs discussed: transportation costs    Discussed  Partnership in Safe Discharge Planning  document with patient/family: No     Handoff Completed: Yes, non-[facility] PCP: External handoff communication completed    Additional Information:  Awaiting ID with final recommendations on antibiotics.  Dr Davis had seen patient on 12/29 with recommendation for stopping the cefepime and starting iv zosyn for now with oral conversion on discharge possible (Levaquin +/- Amox).  Patient resides at [facility].  In anticipation that patient would be ready for discharge by tomorrow, transportation has been set-up via stretcher with service window of 11:07 to 11:52 AM tomorrow.  The [facility] would need patient to return before 3:00 PM.  New medications to be filled here and sent with patient.  Patient's Stepdaughter López approved of stretcher transport and she was updated today on plan for discharge tomorrow.  Electronic PCS form has been submitted.    Next Steps:   Care Management following for safe return to [facility]    Valery Lima RN  Inpatient Care Management  [phone]

## 2025-01-02 NOTE — PROGRESS NOTES
[facility]    Medicine Progress Note - Hospitalist Service    Date of Admission:  12/26/2024    Assessment & Plan   Ron Gilmore is a 84 year old male admitted on 12/26/2024. He is admitted with sepsis likely due to CAUTI in chronic chirinos as well as possible bronchitis.      Sepsis (fever, tachycardia, UTI)  CAUTI, suspected  Chronic chirinos, POA  Chirinos changed on admission 12/26  CT with possible bronchitis. Patient has dry, non-productive cough  UCX shows pseudomonas and enterococcus.  BCX NTD.  Was afebrile for 2 days but had fever of 102 on 12/30  Initially on cefepime-->zosyn per ID (coverage for both bacteria).12/29--1/2: Will change to p.o. Levaquin and amoxicillin today.  If remains stable, discharge tomorrow morning    Hx of CVA with residual left sided deficits  HTN  HLD  -continue ASA, statin, lasix, metoprolol     Oropharyngeal dysphagia  - Diet per SLP.     DMT2  Not on PTA meds. Glucose elevated on admission  - med dose sliding scale     BRAD  Acute on Chronic Respiratory failure with hypoxia,  on 2L O2 at baseline, 3L on admission   COPD with acute exacerbation  PNA vs bronchitis.  CXR on 12/28 shows atalectasis vs PNA. CO2 at baseline on VBG.  - Azithromycin x 3 days, completed 12/30  - Prednisone for 5 days, last dose 1/1  - Does not tolerate bipap  - Continue PTA nebs    BPH  Depression  Anxiety  - resume PTA meds.     GERD  - PPI     Gout  - continue allopurinol      Mediastinal and hilar adenopathy  - Consider repeat CT in 6 months        Diet: Diet  Combination Diet Regular Diet Adult; Pureed Diet (level 4); Mildly Thick (level 2) (Upright, assistance with feeding, no straws, small bites/sips and alternate liquids/solids. If increased coughing noted with mildly thick return to moderately thic...  Room Service    DVT Prophylaxis: Pneumatic Compression Devices  Chirinos Catheter: PRESENT, indication: Other (Comment) (Chronic)  Lines: None     Cardiac Monitoring: None  Code  "Status: No CPR- Do NOT Intubate      Clinically Significant Risk Factors               # Hypoalbuminemia: Lowest albumin = 3.1 g/dL at 12/30/2024  7:33 PM, will monitor as appropriate         # Acute Hypoxic Respiratory Failure: Documented O2 saturation < 90%. Continue supplemental oxygen as needed         # Obesity: Estimated body mass index is 31.1 kg/m  as calculated from the following:    Height as of this encounter: 1.829 m (6').    Weight as of this encounter: 104 kg (229 lb 4.5 oz).      # Financial/Environmental Concerns: none  # COPD: noted on problem list        Social Drivers of Health    Tobacco Use: Medium Risk (7/15/2024)    Patient History     Smoking Tobacco Use: Former     Smokeless Tobacco Use: Never          Disposition Plan     Medically Ready for Discharge: Anticipated Tomorrow         Low Puri MD  Hospitalist Service  [facility]  Securely message with [employer] (more info)  Text page via [employer] Paging/Directory   \"This dictation was performed with voice recognition software and may contain errors,  omissions and inadvertent word substitution.\"    ______________________________________________________________________    Interval History   History reviewed.  Feels better  No specific new complaint  Physical Exam   BP (!) 154/77 (BP Location: Right arm)   Pulse 61   Temp 97.6  F (36.4  C) (Oral)   Resp 18   Ht 1.829 m (6')   Wt 104 kg (229 lb 4.5 oz)   SpO2 93%   BMI 31.10 kg/m    Gen- pleasant   Neck- supple  CVS- I+II+ no m/r/g  RS- CTAB  Abdo- soft, no tenderness . No g/r/r    Medical Decision Making       51 MINUTES SPENT BY ME on the date of service doing chart review, history, exam, documentation & further activities per the note.      Data   ------------------------- PAST 24 HR DATA REVIEWED -----------------------------------------------        Imaging results reviewed over the past 24 hrs:   No results found for this or any previous visit (from the past " 24 hours).

## 2025-01-02 NOTE — PLAN OF CARE
Summary: Sepsis likely due to CAUTI in chronic chirinos as well as possible bronchitis     Shift: 1/01/25 Evenings   Orientation: A&Ox2-3; Intermittent confusion; [place]. Voice improving after oral cares and fluids.   BEHAVIOR & AGGRESSION TOOL COLOR: Green  Vitals/Tele: VSS on 2-3L NC-baseline, sats low to mid 90s.Scheduled Nebs and inhaler.   PAIN MANAGMENT: Denies   IV Access/drains: RFA PIV SL with intermittent Zosyn Q6h   Diet: Pureed diet (level 4), mild thick liquids--assist with feed. Need assist with ordering and feeding. Ate 50% with assist this evening. Encouraged fluids. Takes pills whole 1 at a time with applesauce.   Mobility: 2PA, lift.  Turn/repo Q2h. LUE/LLL sided weakness from previous CVA   GI/: Chronic chirinos, 1 loose BM this shift. Scheduled Senna held d/t reported multiple stools last night and on days.    Wound/Skin: Peeling to sacrum/coccyx mepilex in place, barrier cream applied, redness to scrotum, and scattered bruising, Peeling heels, clammy skin. 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   Consults: None today   Discharge Plan: Pending improvement, back to [facility]  OTHER IMPORTANT INFO: Contact precaution for VRE, LS coarse, infrequent congested cough-Scheduled nebs, inhalers; ID/SLP/SW following; switch to oral antibiotics upon discharge. Prefers Pills Whole in Applesauce. Bed alarm on for safety. Calls occasionally. Rounded on freq.

## 2025-01-02 NOTE — PLAN OF CARE
Summary: Sepsis likely due to CAUTI in chronic chirinos as well as possible bronchitis      Shift: 1/01-2/25 23:00-07:30  Orientation: A&Ox3-4; Intermittent confusion; [place]. Voice improving w/ fluids.   BEHAVIOR & AGGRESSION TOOL COLOR: Green  Vitals/Tele: VSS on 3.5L NC-baseline 2-3, sats low to mid 90s.Scheduled Nebs and inhaler.   PAIN MANAGMENT: Denies rain this shift   IV Access/drains: RPIV SL with intermittent Zosyn Q6h   Diet: Pureed diet (level 4), mild thick liquids--assist with feed. Need assist with ordering and feeding.Encouraged fluids. Takes pills whole 1 at a time with applesauce.   Mobility: Ax2, lift.  Turn/repo Q2h. L sided weakness from previous CVA   GI/: Chronic chirinos,  no BM this shift.   Wound/Skin: Peeling to sacrum/coccyx mepilex in place, redness to scrotum, and scattered bruising, Peeling heels, clammy skin. 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   Consults: None today   Discharge Plan: Pending improvement, back to [facility]  OTHER IMPORTANT INFO: Contact precaution for VRE, LS coarse, infrequent congested cough-Scheduled nebs, inhalers; ID/SLP/SW following; switch to oral antibiotics upon discharge. Prefers Pills Whole in Applesauce.

## 2025-01-03 VITALS
WEIGHT: 217.15 LBS | HEIGHT: 72 IN | DIASTOLIC BLOOD PRESSURE: 65 MMHG | BODY MASS INDEX: 29.41 KG/M2 | RESPIRATION RATE: 18 BRPM | SYSTOLIC BLOOD PRESSURE: 131 MMHG | HEART RATE: 62 BPM | OXYGEN SATURATION: 96 % | TEMPERATURE: 97.5 F

## 2025-01-03 LAB
GLUCOSE BLDC GLUCOMTR-MCNC: 107 MG/DL (ref 70–99)
GLUCOSE BLDC GLUCOMTR-MCNC: 130 MG/DL (ref 70–99)

## 2025-01-03 PROCEDURE — 250N000009 HC RX 250: Performed by: STUDENT IN AN ORGANIZED HEALTH CARE EDUCATION/TRAINING PROGRAM

## 2025-01-03 PROCEDURE — 999N000157 HC STATISTIC RCP TIME EA 10 MIN

## 2025-01-03 PROCEDURE — 94640 AIRWAY INHALATION TREATMENT: CPT

## 2025-01-03 PROCEDURE — 99239 HOSP IP/OBS DSCHRG MGMT >30: CPT | Performed by: INTERNAL MEDICINE

## 2025-01-03 PROCEDURE — 250N000013 HC RX MED GY IP 250 OP 250 PS 637: Performed by: STUDENT IN AN ORGANIZED HEALTH CARE EDUCATION/TRAINING PROGRAM

## 2025-01-03 PROCEDURE — 250N000013 HC RX MED GY IP 250 OP 250 PS 637: Performed by: INTERNAL MEDICINE

## 2025-01-03 RX ADMIN — IPRATROPIUM BROMIDE AND ALBUTEROL 1 PUFF: 20; 100 SPRAY, METERED RESPIRATORY (INHALATION) at 08:10

## 2025-01-03 RX ADMIN — LEVOFLOXACIN 500 MG: 250 TABLET, FILM COATED ORAL at 08:12

## 2025-01-03 RX ADMIN — FUROSEMIDE 20 MG: 20 TABLET ORAL at 08:12

## 2025-01-03 RX ADMIN — PANTOPRAZOLE SODIUM 40 MG: 40 TABLET, DELAYED RELEASE ORAL at 08:12

## 2025-01-03 RX ADMIN — ASPIRIN 81 MG: 81 TABLET, COATED ORAL at 08:11

## 2025-01-03 RX ADMIN — AMOXICILLIN 500 MG: 500 CAPSULE ORAL at 06:28

## 2025-01-03 RX ADMIN — ATORVASTATIN CALCIUM 10 MG: 10 TABLET, FILM COATED ORAL at 08:12

## 2025-01-03 RX ADMIN — METOPROLOL TARTRATE 12.5 MG: 25 TABLET, FILM COATED ORAL at 08:11

## 2025-01-03 RX ADMIN — FORMOTEROL FUMARATE DIHYDRATE 20 MCG: 20 SOLUTION RESPIRATORY (INHALATION) at 07:42

## 2025-01-03 RX ADMIN — METHENAMINE HIPPURATE 1 G: 1 TABLET ORAL at 08:12

## 2025-01-03 RX ADMIN — ALLOPURINOL 300 MG: 300 TABLET ORAL at 08:12

## 2025-01-03 RX ADMIN — SERTRALINE HYDROCHLORIDE 100 MG: 100 TABLET ORAL at 08:11

## 2025-01-03 RX ADMIN — DEXTRAN 70, GLYCERIN, HYPROMELLOSE 2 DROP: 1; 2; 3 SOLUTION/ DROPS OPHTHALMIC at 08:10

## 2025-01-03 ASSESSMENT — ACTIVITIES OF DAILY LIVING (ADL)
ADLS_ACUITY_SCORE: 84

## 2025-01-03 NOTE — PLAN OF CARE
DATE & TIME: 1/2/25 9419-1732    Cognitive Concerns/ Orientation : Pt A/Ox3, disoriented to time. Intermittent confusion. [place].   BEHAVIOR & AGGRESSION TOOL COLOR: Green   ABNL VS/O2: VSS on 3L NC  MOBILITY: Lift, fall risk. Reposition q2h.  PAIN MANAGMENT: Denies  DIET: Pureed, mildly thick. Needs assistance with feeding. Pt takes pills one at a time with applesauce.  BOWEL/BLADDER: Chronic chirinos patent with adequate output.   ABNL LAB/BG:   DRAIN/DEVICES: IV SL  SKIN: Sacrum/coccyx blanchable redness, mepilex in place. Redness to scrotum. Scattered bruising.   D/C DATE: Discharge to [facility] today between 6345-1690  OTHER IMPORTANT INFO: Lung sounds diminished/coarse. Infrequent, congested cough.

## 2025-01-03 NOTE — PLAN OF CARE
Continue mematine     Goal Outcome Evaluation:      Plan of Care Reviewed With: patient    Overall Patient Progress: improvingOverall Patient Progress: improving         Summary  Sepsis likely due to CAUTI in chronic chirinos as well as possible bronchitis    Hx CVA with residual left sided deficits, HTN, HLD, DMT2, BPH, Depression, GERD, Gout     1/2/2025 7739-1136     Orientation A&O x 4    Vitals/Tele VSS on 2L NC-baseline (humidified)     IV Access/drains PIV SL     Diet Pureed diet (level 4), mild thick liquids--assist with feed. Need assist with ordering and feeding. Encouraged fluids. Takes pills whole 1 at a time with applesauce.       Mobility Ast 2/  T/R LUE/LLL sided weakness from previous CVA    GI/ 1 BM this shift, Chirinos in place good uop    Wound/Skin Pale, dry heels, redness on buttocks/ sacrum mepilex in place     Consults respiratory     Discharge Plan Pending back to car facility tomorrow between 11-noon      See Flow sheets for assessment

## 2025-01-03 NOTE — PROGRESS NOTES
Discharge    Patient discharged to [facility] via stretcher with [facility] transport.   Care plan note. Antibiotics supplied by discharge pharmacy and sent with patient.    Listed belongings gathered and given to patient (including from security/pharmacy). Yes  Care Plan and Patient education resolved: Yes  Prescriptions if needed, hard copies sent with patient  NA  Medication Bin checked and emptied on discharge Yes  SW/care coordinator/charge RN aware of discharge: Yes

## 2025-01-03 NOTE — DISCHARGE SUMMARY
[facility]  Hospitalist Discharge Summary      Date of Admission:  12/26/2024  Date of Discharge:  1/3/2025  Discharging Provider: Low Puri MD  Discharge Service: Hospitalist Service    Discharge Diagnoses     Sepsis (fever, tachycardia, UTI)  CAUTI, suspected  Chronic chirinos, POA    Hx of CVA with residual left sided deficits  HTN  HLD    Oropharyngeal dysphagia       DMT2       BRAD  Acute on Chronic Respiratory failure with hypoxia,  on 2L O2 at baseline, 3L on admission   COPD with acute exacerbation  PNA vs bronchitis.  BPH  Depression  Anxiety   GERD   Gout   Mediastinal and hilar adenopathy      Clinically Significant Risk Factors     # Overweight: Estimated body mass index is 29.45 kg/m  as calculated from the following:    Height as of this encounter: 1.829 m (6').    Weight as of this encounter: 98.5 kg (217 lb 2.5 oz).       Follow-ups Needed After Discharge   Follow-up Appointments       Hospital Follow-up with Existing Primary Care Provider (PCP)      Please see details below         Schedule Primary Care visit within: 7 Days           {Additional follow-up instructions/to-do's for PCP    :Mediastinal and hilar adenopathy  - Consider repeat CT in 6 months    Unresulted Labs Ordered in the Past 30 Days of this Admission       Date and Time Order Name Status Description    12/30/2024 12:38 PM Blood Culture Hand, Right Preliminary     12/30/2024 12:38 PM Blood Culture Hand, Right Preliminary             Discharge Disposition   Discharged to assisted living  Condition at discharge: Stable    Hospital Course   Ron Gilmore is a 84 year old male admitted on 12/26/2024. He is admitted with sepsis likely due to CAUTI in chronic chirinos as well as possible bronchitis.      Sepsis (fever, tachycardia, UTI)  CAUTI, suspected  Chronic chirinos, POA  Chirinos changed on admission 12/26  CT with possible bronchitis. Patient has dry, non-productive cough  UCX shows pseudomonas and  enterococcus.  BCX NTD.  Initially on cefepime-->zosyn per ID (coverage for both bacteria).12/29--1/2:  changed to p.o. Levaquin and amoxicillin 1/2 to complete 10 day course     Hx of CVA with residual left sided deficits  HTN  HLD  -continued ASA, statin, lasix, metoprolol     Oropharyngeal dysphagia  - Diet per SLP.     DMT2  Not on PTA meds. Glucose elevated on admission  - med dose sliding scale     BRAD  Acute on Chronic Respiratory failure with hypoxia,  on 2L O2 at baseline, 3L on admission   COPD with acute exacerbation  PNA vs bronchitis.  CXR on 12/28 shows atalectasis vs PNA. CO2 at baseline on VBG.  - Azithromycin x 3 days, completed 12/30  - Prednisone for 5 days, last dose 1/1  - Does not tolerate bipap  - Continued PTA nebs     BPH  Depression  Anxiety  - resumed PTA meds.     GERD  - PPI     Gout  - continued allopurinol      Mediastinal and hilar adenopathy  - Consider repeat CT in 6 months       Consultations This Hospital Stay   CARE MANAGEMENT / SOCIAL WORK IP CONSULT  PHYSICAL THERAPY ADULT IP CONSULT  OCCUPATIONAL THERAPY ADULT IP CONSULT  INFECTIOUS DISEASES IP CONSULT  SPEECH LANGUAGE PATH ADULT IP CONSULT  INFECTIOUS DISEASES IP CONSULT    Code Status   No CPR- Do NOT Intubate    Time Spent on this Encounter   I, Low Puri MD, personally saw the patient today and spent greater than 30 minutes discharging this patient.       Low Puri MD  [place] [address] [facility]  [facility] [address] [address]  Phone: [phone]  ______________________________________________________________________    Physical Exam   Vital Signs: Temp: 97.5  F (36.4  C) Temp src: Axillary BP: 131/65 Pulse: 62   Resp: 18 SpO2: 96 % O2 Device: Nasal cannula Oxygen Delivery: 3 LPM  Weight: 217 lbs 2.45 oz  Gen- pleasant   Neck- supple  CVS- I+II+ no m/r/g  RS- CTAB  Abdo- soft, no tenderness . No g/r/r       Primary Care Physician   [facility]    Discharge  Orders      Reason for your hospital stay    Sepsis  UTI/Pyelonephritis.  COPD exacerbation.     Activity    Your activity upon discharge: activity as tolerated     Discharge Instructions    Resume PTA oxygen.  Resume PTA therapies and other cares.    Resume chirinos care.     Resume Home Care Services     Diet    Follow this diet upon discharge: Current Diet:Orders Placed This Encounter      Combination Diet Regular Diet Adult; Pureed Diet (level 4); Liquidized/Moderately Thick (level 3)     Hospital Follow-up with Existing Primary Care Provider (PCP)    Please see details below            Significant Results and Procedures   Results for orders placed or performed during the hospital encounter of 12/26/24   CT Chest Pulmonary Embolism w Contrast    Narrative    EXAM: CT CHEST PULMONARY EMBOLISM W CONTRAST  LOCATION: [facility]  DATE: 12/26/2024    INDICATION: hypoxia, cough, bed bound, eval PE vs infection  COMPARISON: CT 10/11/2024  TECHNIQUE: CT chest pulmonary angiogram during arterial phase injection of IV contrast. Multiplanar reformats and MIP reconstructions were performed. Dose reduction techniques were used.   CONTRAST: 77 mL Isovue 370    FINDINGS:  ANGIOGRAM CHEST: Pulmonary arteries are normal caliber and negative for pulmonary emboli. Thoracic aorta is negative for dissection. No CT evidence of right heart strain.    LUNGS AND PLEURA: Moderate upper lobe predominant centrilobular emphysema. Bibasilar hypoventilatory changes without definite airspace consolidation, pleural effusion or pneumothorax. Central airways are patent. Lower lobe predominant bronchial wall   thickening is noted. Calcified granulomas are noted.    MEDIASTINUM/AXILLAE: Stable size of mildly enlarged mediastinal and hilar lymph nodes with representative right paratracheal lymph node measuring 1.6 cm in short axis, previously 1.2 cm (series 5 image 72). No definite new lymphadenopathy. No pericardial    effusion.    CORONARY ARTERY CALCIFICATION: Severe.    UPPER ABDOMEN: Cholecystectomy.    MUSCULOSKELETAL: No acute bony abnormality.      Impression    IMPRESSION:  1.  Possible mild bronchitis without jean airspace consolidation or pleural effusion.  2.  No pulmonary embolus.  3.  Persistent enlarged mediastinal and hilar lymph nodes which again could be reactive but consider follow-up chest CT in 6 months to document stability/resolution.   XR Chest Port 1 View    Narrative    EXAM: XR CHEST PORT 1 VIEW  LOCATION: [facility]  DATE: 12/28/2024    INDICATION: Shortness of breath.  COMPARISON: Chest radiograph 10/29/2024. Chest CT 12/26/2024.      Impression    IMPRESSION:     Lung volumes are low. Hazy opacities at the bases of the right upper lobe and both lower lobes are favored to reflect atelectasis, although infection could also be possible. Increased interstitial markings throughout the lungs could reflect mild   interstitial edema or changes related to underlying emphysema. Possible trace pleural effusions. No pneumothorax.    Stable cardiomediastinal silhouette with prominence of the right paratracheal soft tissues.    Loop recorder device overlies the left chest wall.       Discharge Medications   Current Discharge Medication List        START taking these medications    Details   amoxicillin (AMOXIL) 500 MG capsule Take 1 capsule (500 mg) by mouth every 8 hours for 6 days.  Qty: 18 capsule, Refills: 0    Associated Diagnoses: Urinary tract infection associated with indwelling urethral catheter, initial encounter (H)      levofloxacin (LEVAQUIN) 500 MG tablet Take 1 tablet (500 mg) by mouth daily for 6 days.  Qty: 6 tablet, Refills: 0    Associated Diagnoses: Urinary tract infection associated with indwelling urethral catheter, initial encounter (H)           CONTINUE these medications which have NOT CHANGED    Details   acetaminophen (TYLENOL) 325 MG tablet Take 650 mg by mouth  every 4 hours as needed for mild pain as needed for pain/fever Max dose 3000mg/24hr      allopurinol (ZYLOPRIM) 300 MG tablet Take 300 mg by mouth every morning 0900      aspirin (ASA) 81 MG EC tablet Take 1 tablet (81 mg) by mouth daily  Qty:      Associated Diagnoses: Acute and chronic respiratory failure with hypoxia (H)      atorvastatin (LIPITOR) 10 MG tablet Take 10 mg by mouth every morning 0900      bisacodyl (DULCOLAX) 10 MG suppository Place 10 mg rectally three times a week. Mon/Wed/Fri      formoterol (PERFOROMIST) 20 MCG/2ML neb solution Take 20 mcg by nebulization every 12 hours 1000, 2300      furosemide (LASIX) 20 MG tablet Take 20 mg by mouth daily 0900  Qty: 45 tablet, Refills: 0    Associated Diagnoses: Acute diastolic congestive heart failure (H)      !! hypromellose (ARTIFICIAL TEARS) 0.5 % SOLN ophthalmic solution Place 2 drops into both eyes 2 times daily 0800, 2000      !! hypromellose (ARTIFICIAL TEARS) 0.5 % SOLN ophthalmic solution Place 2 drops into both eyes 2 times daily as needed for dry eyes      ipratropium - albuterol 0.5 mg/2.5 mg/3 mL (DUONEB) 0.5-2.5 (3) MG/3ML neb solution Take 1 vial by nebulization 3 times daily as needed for shortness of breath, wheezing or cough.      ipratropium-albuterol (COMBIVENT RESPIMAT)  MCG/ACT inhaler Inhale 1 puff into the lungs 4 times daily 0900, 1200 ,1600 ,2000      loperamide (IMODIUM) 2 MG capsule Take 2 mg by mouth every 6 hours as needed for diarrhea      melatonin 3 MG tablet Take 3 mg by mouth at bedtime. May repeat dose if not effective      methenamine hippurate (HIPREX) 1 g tablet Take 1 g by mouth 2 times daily 0900, 2000      metoprolol tartrate (LOPRESSOR) 25 MG tablet Take 0.5 tablets (12.5 mg) by mouth 2 times daily    Associated Diagnoses: Cerebrovascular accident (CVA), unspecified mechanism (H)      pantoprazole (PROTONIX) 40 MG EC tablet Take 40 mg by mouth 2 times daily 0900, 2000      !! senna-docusate  (SENOKOT-S/PERICOLACE) 8.6-50 MG tablet Take 2 tablets by mouth 2 times daily. 0900, 2000      !! senna-docusate (SENOKOT-S/PERICOLACE) 8.6-50 MG tablet Take 1 tablet by mouth daily as needed for constipation      sertraline (ZOLOFT) 50 MG tablet Take 100 mg by mouth daily. 0900      simethicone (MYLICON) 125 MG chewable tablet Take 125 mg by mouth 3 times daily as needed for intestinal gas      Vitamin D3 (VITAMIN D, CHOLECALCIFEROL,) 25 mcg (1000 units) tablet Take 1 tablet by mouth daily 0800      ammonium lactate (LAC-HYDRIN) 12 % external lotion Apply topically daily Apply a thin film to bilateral feet and legs       !! - Potential duplicate medications found. Please discuss with provider.        Allergies   No Known Allergies

## 2025-01-03 NOTE — PROGRESS NOTES
Care Management Discharge Note    Discharge Date: 01/03/2025       Discharge Disposition: [facility]    Discharge Services: Transportation Services    Discharge DME: Oxygen (resume)    Discharge Transportation: agency    Private pay costs discussed: transportation costs    Does the patient's insurance plan have a 3 day qualifying hospital stay waiver?  No    PAS Confirmation Code:  NA  Patient/family educated on Medicare website which has current facility and service quality ratings:  NA    Education Provided on the Discharge Plan: Yes  Persons Notified of Discharge Plans: Patient, Stepdaughter López; Liz, Nurse from [facility]; Ns  Patient/Family in Agreement with the Plan: yes    Handoff Referral Completed: No, handoff not indicated or clinically appropriate    Additional Information:  Patient is discharging back to [facility] this AM.  Stretcher transport is scheduled for transport (11:07 to 11:52 AM ).  Orders have been faxed to [facility] at ([phone]) and have had conversation with Liz Nurse   ([phone]) for the [employer] [facility] at [facility].  Medications have been filled here and will be sent with patient.  Patient's Stepdaughter López was updated yesterday afternoon and in agreement with discharge plan.    Valery Lima, RN  Inpatient Care Management  [phone]

## 2025-01-03 NOTE — PLAN OF CARE
Speech Language Therapy Discharge Summary    Reason for therapy discharge:    Discharged to [facility]    Progress towards therapy goal(s). See goals on Care Plan in [facility] electronic health record for goal details.  Goals partially met.  Barriers to achieving goals:   discharge from facility.    Therapy recommendation(s):    Continued therapy is recommended.  Rationale/Recommendations:  Pt will benefit from skilled SLP services for dysphagia management to safely advance to least restrictive diet while reducing aspiration risk.  Patient continues to be at risk for aspiration. Continue on the puree diet and changed to mildly thick liquids. If couging with mildly thick return to moderately thick. See SLP evaluation for recommended swallow strategies.

## 2025-01-04 ENCOUNTER — PATIENT OUTREACH (OUTPATIENT)
Dept: CARE COORDINATION | Facility: CLINIC | Age: 85
End: 2025-01-04
Payer: MEDICARE

## 2025-01-04 LAB
BACTERIA BLD CULT: NO GROWTH
BACTERIA BLD CULT: NO GROWTH

## 2025-01-04 NOTE — PROGRESS NOTES
[facility] Care Resource [facility]    Background: Transitional Care Management program identified per system criteria and reviewed by [facility] team for possible outreach.    Assessment: Upon chart review, [facility] Team member will not proceed with patient outreach related to this episode of Transitional Care Management program due to reason below:    Patient has discharged to a Memory Care, Long-term Care, Assisted Living or Group Home where patient is receiving on-site support with their daily cares, including support with hospital follow up plan.    Plan: Transitional Care Management episode addressed appropriately per reason noted above.      BRANDY Jackson  Connected Care Resource [facility]    *Connected Care Resource Team does NOT follow patient ongoing. Referrals are identified based on internal discharge reports and the outreach is to ensure patient has an understanding of their discharge instructions.

## 2025-01-29 ENCOUNTER — HOSPITAL ENCOUNTER (INPATIENT)
Facility: CLINIC | Age: 85
End: 2025-01-29
Attending: EMERGENCY MEDICINE | Admitting: HOSPITALIST
Payer: MEDICARE

## 2025-01-29 ENCOUNTER — APPOINTMENT (OUTPATIENT)
Dept: GENERAL RADIOLOGY | Facility: CLINIC | Age: 85
End: 2025-01-29
Attending: EMERGENCY MEDICINE
Payer: MEDICARE

## 2025-01-29 DIAGNOSIS — F33.0 MILD EPISODE OF RECURRENT MAJOR DEPRESSIVE DISORDER: ICD-10-CM

## 2025-01-29 DIAGNOSIS — J18.9 PNEUMONIA DUE TO INFECTIOUS ORGANISM, UNSPECIFIED LATERALITY, UNSPECIFIED PART OF LUNG: ICD-10-CM

## 2025-01-29 DIAGNOSIS — J96.21 ACUTE ON CHRONIC RESPIRATORY FAILURE WITH HYPOXIA (H): ICD-10-CM

## 2025-01-29 DIAGNOSIS — J18.9 PNEUMONIA OF BOTH LOWER LOBES DUE TO INFECTIOUS ORGANISM: ICD-10-CM

## 2025-01-29 DIAGNOSIS — N39.0 URINARY TRACT INFECTION WITHOUT HEMATURIA, SITE UNSPECIFIED: Primary | ICD-10-CM

## 2025-01-29 LAB
ALBUMIN SERPL BCG-MCNC: 3.4 G/DL (ref 3.5–5.2)
ALBUMIN UR-MCNC: 50 MG/DL
ALP SERPL-CCNC: 92 U/L (ref 40–150)
ALT SERPL W P-5'-P-CCNC: 12 U/L (ref 0–70)
ANION GAP SERPL CALCULATED.3IONS-SCNC: 8 MMOL/L (ref 7–15)
APPEARANCE UR: ABNORMAL
AST SERPL W P-5'-P-CCNC: 21 U/L (ref 0–45)
BACTERIA #/AREA URNS HPF: ABNORMAL /HPF
BASE EXCESS BLDV CALC-SCNC: 5 MMOL/L (ref -3–3)
BASOPHILS # BLD AUTO: 0 10E3/UL (ref 0–0.2)
BASOPHILS NFR BLD AUTO: 0 %
BILIRUB DIRECT SERPL-MCNC: <0.2 MG/DL (ref 0–0.3)
BILIRUB SERPL-MCNC: 0.3 MG/DL
BILIRUB UR QL STRIP: NEGATIVE
BUN SERPL-MCNC: 18.8 MG/DL (ref 8–23)
CALCIUM SERPL-MCNC: 9 MG/DL (ref 8.8–10.4)
CHLORIDE SERPL-SCNC: 99 MMOL/L (ref 98–107)
COLOR UR AUTO: YELLOW
CREAT SERPL-MCNC: 0.86 MG/DL (ref 0.67–1.17)
EGFRCR SERPLBLD CKD-EPI 2021: 86 ML/MIN/1.73M2
EOSINOPHIL # BLD AUTO: 0.1 10E3/UL (ref 0–0.7)
EOSINOPHIL NFR BLD AUTO: 1 %
ERYTHROCYTE [DISTWIDTH] IN BLOOD BY AUTOMATED COUNT: 19.8 % (ref 10–15)
FLUAV RNA SPEC QL NAA+PROBE: NEGATIVE
FLUBV RNA RESP QL NAA+PROBE: NEGATIVE
GLUCOSE BLDC GLUCOMTR-MCNC: 110 MG/DL (ref 70–99)
GLUCOSE BLDC GLUCOMTR-MCNC: 87 MG/DL (ref 70–99)
GLUCOSE BLDC GLUCOMTR-MCNC: 97 MG/DL (ref 70–99)
GLUCOSE SERPL-MCNC: 178 MG/DL (ref 70–99)
GLUCOSE UR STRIP-MCNC: NEGATIVE MG/DL
HCO3 BLDV-SCNC: 31 MMOL/L (ref 21–28)
HCO3 SERPL-SCNC: 29 MMOL/L (ref 22–29)
HCT VFR BLD AUTO: 38 % (ref 40–53)
HGB BLD-MCNC: 11.3 G/DL (ref 13.3–17.7)
HGB UR QL STRIP: ABNORMAL
HOLD SPECIMEN: NORMAL
HOLD SPECIMEN: NORMAL
IMM GRANULOCYTES # BLD: 0.1 10E3/UL
IMM GRANULOCYTES NFR BLD: 1 %
KETONES UR STRIP-MCNC: NEGATIVE MG/DL
LACTATE BLD-SCNC: 1.9 MMOL/L
LEUKOCYTE ESTERASE UR QL STRIP: ABNORMAL
LYMPHOCYTES # BLD AUTO: 0.7 10E3/UL (ref 0.8–5.3)
LYMPHOCYTES NFR BLD AUTO: 4 %
MCH RBC QN AUTO: 24.6 PG (ref 26.5–33)
MCHC RBC AUTO-ENTMCNC: 29.7 G/DL (ref 31.5–36.5)
MCV RBC AUTO: 83 FL (ref 78–100)
MONOCYTES # BLD AUTO: 1.1 10E3/UL (ref 0–1.3)
MONOCYTES NFR BLD AUTO: 7 %
MUCOUS THREADS #/AREA URNS LPF: PRESENT /LPF
NEUTROPHILS # BLD AUTO: 14.7 10E3/UL (ref 1.6–8.3)
NEUTROPHILS NFR BLD AUTO: 88 %
NITRATE UR QL: NEGATIVE
NRBC # BLD AUTO: 0 10E3/UL
NRBC BLD AUTO-RTO: 0 /100
NT-PROBNP SERPL-MCNC: 257 PG/ML (ref 0–1800)
PCO2 BLDV: 55 MM HG (ref 40–50)
PH BLDV: 7.36 [PH] (ref 7.32–7.43)
PH UR STRIP: 6 [PH] (ref 5–7)
PLATELET # BLD AUTO: 199 10E3/UL (ref 150–450)
PO2 BLDV: 44 MM HG (ref 25–47)
POTASSIUM SERPL-SCNC: 4.7 MMOL/L (ref 3.4–5.3)
PROT SERPL-MCNC: 7.8 G/DL (ref 6.4–8.3)
RBC # BLD AUTO: 4.59 10E6/UL (ref 4.4–5.9)
RBC URINE: 66 /HPF
RSV RNA SPEC NAA+PROBE: NEGATIVE
SAO2 % BLDV: 76 % (ref 70–75)
SARS-COV-2 RNA RESP QL NAA+PROBE: NEGATIVE
SODIUM SERPL-SCNC: 136 MMOL/L (ref 135–145)
SP GR UR STRIP: 1.03 (ref 1–1.03)
TROPONIN T SERPL HS-MCNC: 22 NG/L
UROBILINOGEN UR STRIP-MCNC: NORMAL MG/DL
WBC # BLD AUTO: 16.7 10E3/UL (ref 4–11)
WBC CLUMPS #/AREA URNS HPF: PRESENT /HPF
WBC URINE: >182 /HPF
YEAST #/AREA URNS HPF: ABNORMAL /HPF

## 2025-01-29 PROCEDURE — 250N000009 HC RX 250: Performed by: EMERGENCY MEDICINE

## 2025-01-29 PROCEDURE — 83880 ASSAY OF NATRIURETIC PEPTIDE: CPT | Performed by: EMERGENCY MEDICINE

## 2025-01-29 PROCEDURE — 84484 ASSAY OF TROPONIN QUANT: CPT | Performed by: EMERGENCY MEDICINE

## 2025-01-29 PROCEDURE — 81003 URINALYSIS AUTO W/O SCOPE: CPT | Performed by: EMERGENCY MEDICINE

## 2025-01-29 PROCEDURE — 85004 AUTOMATED DIFF WBC COUNT: CPT | Performed by: EMERGENCY MEDICINE

## 2025-01-29 PROCEDURE — 87637 SARSCOV2&INF A&B&RSV AMP PRB: CPT | Performed by: EMERGENCY MEDICINE

## 2025-01-29 PROCEDURE — 250N000011 HC RX IP 250 OP 636: Performed by: HOSPITALIST

## 2025-01-29 PROCEDURE — 82962 GLUCOSE BLOOD TEST: CPT

## 2025-01-29 PROCEDURE — 80053 COMPREHEN METABOLIC PANEL: CPT | Performed by: EMERGENCY MEDICINE

## 2025-01-29 PROCEDURE — 250N000013 HC RX MED GY IP 250 OP 250 PS 637: Performed by: HOSPITALIST

## 2025-01-29 PROCEDURE — 99223 1ST HOSP IP/OBS HIGH 75: CPT | Mod: AI | Performed by: HOSPITALIST

## 2025-01-29 PROCEDURE — 82248 BILIRUBIN DIRECT: CPT | Performed by: EMERGENCY MEDICINE

## 2025-01-29 PROCEDURE — 258N000003 HC RX IP 258 OP 636: Performed by: HOSPITALIST

## 2025-01-29 PROCEDURE — 85014 HEMATOCRIT: CPT | Performed by: EMERGENCY MEDICINE

## 2025-01-29 PROCEDURE — 82803 BLOOD GASES ANY COMBINATION: CPT

## 2025-01-29 PROCEDURE — 250N000011 HC RX IP 250 OP 636: Performed by: EMERGENCY MEDICINE

## 2025-01-29 PROCEDURE — 71046 X-RAY EXAM CHEST 2 VIEWS: CPT

## 2025-01-29 PROCEDURE — 36415 COLL VENOUS BLD VENIPUNCTURE: CPT | Performed by: EMERGENCY MEDICINE

## 2025-01-29 PROCEDURE — 87186 SC STD MICRODIL/AGAR DIL: CPT | Performed by: EMERGENCY MEDICINE

## 2025-01-29 PROCEDURE — 99285 EMERGENCY DEPT VISIT HI MDM: CPT

## 2025-01-29 PROCEDURE — 87899 AGENT NOS ASSAY W/OPTIC: CPT | Performed by: HOSPITALIST

## 2025-01-29 PROCEDURE — 120N000001 HC R&B MED SURG/OB

## 2025-01-29 PROCEDURE — 87086 URINE CULTURE/COLONY COUNT: CPT | Performed by: EMERGENCY MEDICINE

## 2025-01-29 PROCEDURE — 258N000003 HC RX IP 258 OP 636: Performed by: EMERGENCY MEDICINE

## 2025-01-29 RX ORDER — ALLOPURINOL 300 MG/1
300 TABLET ORAL EVERY MORNING
Status: DISPENSED | OUTPATIENT
Start: 2025-01-30

## 2025-01-29 RX ORDER — LIDOCAINE HYDROCHLORIDE 20 MG/ML
10 JELLY TOPICAL ONCE
Status: COMPLETED | OUTPATIENT
Start: 2025-01-29 | End: 2025-01-29

## 2025-01-29 RX ORDER — CARBOXYMETHYLCELLULOSE SODIUM 5 MG/ML
2 SOLUTION/ DROPS OPHTHALMIC 2 TIMES DAILY
Status: DISPENSED | OUTPATIENT
Start: 2025-01-29

## 2025-01-29 RX ORDER — ASPIRIN 81 MG/1
81 TABLET ORAL DAILY
Status: DISPENSED | OUTPATIENT
Start: 2025-01-30

## 2025-01-29 RX ORDER — FUROSEMIDE 20 MG/1
20 TABLET ORAL DAILY
Status: ACTIVE | OUTPATIENT
Start: 2025-01-30

## 2025-01-29 RX ORDER — IPRATROPIUM BROMIDE AND ALBUTEROL SULFATE 2.5; .5 MG/3ML; MG/3ML
1 SOLUTION RESPIRATORY (INHALATION) 3 TIMES DAILY PRN
Status: ACTIVE | OUTPATIENT
Start: 2025-01-29

## 2025-01-29 RX ORDER — CARBOXYMETHYLCELLULOSE SODIUM 5 MG/ML
2 SOLUTION/ DROPS OPHTHALMIC 2 TIMES DAILY PRN
Status: ACTIVE | OUTPATIENT
Start: 2025-01-29

## 2025-01-29 RX ORDER — LIDOCAINE 40 MG/G
CREAM TOPICAL
Status: ACTIVE | OUTPATIENT
Start: 2025-01-29

## 2025-01-29 RX ORDER — AZITHROMYCIN 250 MG/1
250 TABLET, FILM COATED ORAL DAILY
Status: DISPENSED | OUTPATIENT
Start: 2025-01-30 | End: 2025-02-03

## 2025-01-29 RX ORDER — SIMETHICONE 80 MG
80 TABLET,CHEWABLE ORAL 3 TIMES DAILY PRN
Status: ACTIVE | OUTPATIENT
Start: 2025-01-29

## 2025-01-29 RX ORDER — BISACODYL 10 MG
10 SUPPOSITORY, RECTAL RECTAL
Status: ACTIVE | OUTPATIENT
Start: 2025-01-31

## 2025-01-29 RX ORDER — AMOXICILLIN 250 MG
2 CAPSULE ORAL 2 TIMES DAILY
Status: DISPENSED | OUTPATIENT
Start: 2025-01-29

## 2025-01-29 RX ORDER — NICOTINE POLACRILEX 4 MG
15-30 LOZENGE BUCCAL
Status: DISCONTINUED | OUTPATIENT
Start: 2025-01-29 | End: 2025-01-30

## 2025-01-29 RX ORDER — CEFTRIAXONE 2 G/1
2 INJECTION, POWDER, FOR SOLUTION INTRAMUSCULAR; INTRAVENOUS ONCE
Status: DISCONTINUED | OUTPATIENT
Start: 2025-01-29 | End: 2025-01-29

## 2025-01-29 RX ORDER — DEXTROSE MONOHYDRATE 25 G/50ML
25-50 INJECTION, SOLUTION INTRAVENOUS
Status: DISCONTINUED | OUTPATIENT
Start: 2025-01-29 | End: 2025-01-30

## 2025-01-29 RX ORDER — ONDANSETRON 4 MG/1
4 TABLET, ORALLY DISINTEGRATING ORAL EVERY 6 HOURS PRN
Status: ACTIVE | OUTPATIENT
Start: 2025-01-29

## 2025-01-29 RX ORDER — ENOXAPARIN SODIUM 100 MG/ML
40 INJECTION SUBCUTANEOUS EVERY 24 HOURS
Status: DISPENSED | OUTPATIENT
Start: 2025-01-29

## 2025-01-29 RX ORDER — AZITHROMYCIN MONOHYDRATE 500 MG/5ML
500 INJECTION, POWDER, LYOPHILIZED, FOR SOLUTION INTRAVENOUS ONCE
Status: COMPLETED | OUTPATIENT
Start: 2025-01-29 | End: 2025-01-29

## 2025-01-29 RX ORDER — VITAMIN B COMPLEX
25 TABLET ORAL DAILY
Status: DISPENSED | OUTPATIENT
Start: 2025-01-30

## 2025-01-29 RX ORDER — ONDANSETRON 2 MG/ML
4 INJECTION INTRAMUSCULAR; INTRAVENOUS EVERY 6 HOURS PRN
Status: ACTIVE | OUTPATIENT
Start: 2025-01-29

## 2025-01-29 RX ORDER — SODIUM CHLORIDE 9 MG/ML
INJECTION, SOLUTION INTRAVENOUS CONTINUOUS
Status: ACTIVE | OUTPATIENT
Start: 2025-01-29 | End: 2025-01-29

## 2025-01-29 RX ORDER — PIPERACILLIN SODIUM, TAZOBACTAM SODIUM 4; .5 G/20ML; G/20ML
4.5 INJECTION, POWDER, LYOPHILIZED, FOR SOLUTION INTRAVENOUS ONCE
Status: COMPLETED | OUTPATIENT
Start: 2025-01-29 | End: 2025-01-29

## 2025-01-29 RX ORDER — CALCIUM CARBONATE 500 MG/1
1000 TABLET, CHEWABLE ORAL 4 TIMES DAILY PRN
Status: ACTIVE | OUTPATIENT
Start: 2025-01-29

## 2025-01-29 RX ORDER — PANTOPRAZOLE SODIUM 40 MG/1
40 TABLET, DELAYED RELEASE ORAL 2 TIMES DAILY
Status: DISPENSED | OUTPATIENT
Start: 2025-01-29

## 2025-01-29 RX ORDER — AMOXICILLIN 250 MG
1 CAPSULE ORAL DAILY PRN
Status: ACTIVE | OUTPATIENT
Start: 2025-01-29

## 2025-01-29 RX ORDER — GUAIFENESIN 200 MG/10ML
200 LIQUID ORAL EVERY 4 HOURS PRN
Status: ACTIVE | OUTPATIENT
Start: 2025-01-29

## 2025-01-29 RX ORDER — ATORVASTATIN CALCIUM 10 MG/1
10 TABLET, FILM COATED ORAL EVERY MORNING
Status: DISPENSED | OUTPATIENT
Start: 2025-01-30

## 2025-01-29 RX ORDER — PIPERACILLIN SODIUM, TAZOBACTAM SODIUM 3; .375 G/15ML; G/15ML
3.38 INJECTION, POWDER, LYOPHILIZED, FOR SOLUTION INTRAVENOUS EVERY 6 HOURS
Status: DISCONTINUED | OUTPATIENT
Start: 2025-01-29 | End: 2025-01-30

## 2025-01-29 RX ORDER — FORMOTEROL FUMARATE DIHYDRATE 20 UG/2ML
20 SOLUTION RESPIRATORY (INHALATION) EVERY 12 HOURS
Status: DISPENSED | OUTPATIENT
Start: 2025-01-30

## 2025-01-29 RX ADMIN — PIPERACILLIN AND TAZOBACTAM 3.38 G: 3; .375 INJECTION, POWDER, FOR SOLUTION INTRAVENOUS at 20:05

## 2025-01-29 RX ADMIN — PANTOPRAZOLE SODIUM 40 MG: 40 TABLET, DELAYED RELEASE ORAL at 20:01

## 2025-01-29 RX ADMIN — ENOXAPARIN SODIUM 40 MG: 40 INJECTION SUBCUTANEOUS at 18:51

## 2025-01-29 RX ADMIN — SODIUM CHLORIDE 1000 ML: 9 INJECTION, SOLUTION INTRAVENOUS at 12:52

## 2025-01-29 RX ADMIN — AZITHROMYCIN MONOHYDRATE 500 MG: 500 INJECTION, POWDER, LYOPHILIZED, FOR SOLUTION INTRAVENOUS at 13:08

## 2025-01-29 RX ADMIN — SODIUM CHLORIDE: 9 INJECTION, SOLUTION INTRAVENOUS at 20:05

## 2025-01-29 RX ADMIN — METOPROLOL TARTRATE 12.5 MG: 25 TABLET, FILM COATED ORAL at 20:01

## 2025-01-29 RX ADMIN — CARBOXYMETHYLCELLULOSE SODIUM 2 DROP: 5 SOLUTION/ DROPS OPHTHALMIC at 20:01

## 2025-01-29 RX ADMIN — LIDOCAINE HYDROCHLORIDE 10 ML: 20 JELLY TOPICAL at 11:42

## 2025-01-29 RX ADMIN — PIPERACILLIN AND TAZOBACTAM 4.5 G: 4; .5 INJECTION, POWDER, FOR SOLUTION INTRAVENOUS at 13:01

## 2025-01-29 RX ADMIN — SENNOSIDES AND DOCUSATE SODIUM 2 TABLET: 50; 8.6 TABLET ORAL at 20:01

## 2025-01-29 RX ADMIN — UMECLIDINIUM BROMIDE AND VILANTEROL TRIFENATATE 1 PUFF: 62.5; 25 POWDER RESPIRATORY (INHALATION) at 20:52

## 2025-01-29 ASSESSMENT — ACTIVITIES OF DAILY LIVING (ADL)
ADLS_ACUITY_SCORE: 63
ADLS_ACUITY_SCORE: 71
ADLS_ACUITY_SCORE: 63
ADLS_ACUITY_SCORE: 71

## 2025-01-29 NOTE — PHARMACY-ADMISSION MEDICATION HISTORY
Pharmacist Admission Medication History    Admission medication history is complete. The information provided in this note is only as accurate as the sources available at the time of the update.    Information Source(s): Facility ([facility] medication list/MAR ([facility] medication list) via N/A    Pertinent Information: None    Changes made to PTA medication list:  Added: None  Deleted: None  Changed: sertraline; 100 mg to 75 mg    Allergies reviewed with patient and updates made in EHR: no    Medication History Completed By: Tanner Carter RPH 1/29/2025 12:13 PM    PTA Med List   Medication Sig Last Dose/Taking    acetaminophen (TYLENOL) 325 MG tablet Take 650 mg by mouth every 4 hours as needed for mild pain as needed for pain/fever Max dose 3000mg/24hr Taking As Needed    allopurinol (ZYLOPRIM) 300 MG tablet Take 300 mg by mouth every morning 0900 1/29/2025 Morning    ammonium lactate (LAC-HYDRIN) 12 % external lotion Apply topically daily Apply a thin film to bilateral feet and legs Taking    aspirin (ASA) 81 MG EC tablet Take 1 tablet (81 mg) by mouth daily 1/29/2025 Morning    atorvastatin (LIPITOR) 10 MG tablet Take 10 mg by mouth every morning 0900 1/29/2025 Morning    bisacodyl (DULCOLAX) 10 MG suppository Place 10 mg rectally three times a week. Mon/Wed/Fri Taking    formoterol (PERFOROMIST) 20 MCG/2ML neb solution Take 20 mcg by nebulization every 12 hours 1000, 2300 Taking    furosemide (LASIX) 20 MG tablet Take 20 mg by mouth daily 0900 1/29/2025 Morning    hypromellose (ARTIFICIAL TEARS) 0.5 % SOLN ophthalmic solution Place 2 drops into both eyes 2 times daily 0800, 2000 1/29/2025 Morning    hypromellose (ARTIFICIAL TEARS) 0.5 % SOLN ophthalmic solution Place 2 drops into both eyes 2 times daily as needed for dry eyes Taking As Needed    ipratropium - albuterol 0.5 mg/2.5 mg/3 mL (DUONEB) 0.5-2.5 (3) MG/3ML neb solution Take 1 vial by nebulization 3 times daily as needed for shortness of  breath, wheezing or cough. Taking As Needed    ipratropium-albuterol (COMBIVENT RESPIMAT)  MCG/ACT inhaler Inhale 1 puff into the lungs 4 times daily 0900, 1200 ,1600 ,2000 1/29/2025 Morning    loperamide (IMODIUM) 2 MG capsule Take 2 mg by mouth every 6 hours as needed for diarrhea Taking As Needed    melatonin 3 MG tablet Take 3 mg by mouth at bedtime. May repeat dose if not effective Taking    methenamine hippurate (HIPREX) 1 g tablet Take 1 g by mouth 2 times daily 0900, 2000 1/29/2025 Morning    metoprolol tartrate (LOPRESSOR) 25 MG tablet Take 0.5 tablets (12.5 mg) by mouth 2 times daily 1/29/2025 Morning    pantoprazole (PROTONIX) 40 MG EC tablet Take 40 mg by mouth 2 times daily 0900, 2000 1/29/2025 Morning    senna-docusate (SENOKOT-S/PERICOLACE) 8.6-50 MG tablet Take 2 tablets by mouth 2 times daily. 0900, 2000 1/29/2025 Morning    senna-docusate (SENOKOT-S/PERICOLACE) 8.6-50 MG tablet Take 1 tablet by mouth daily as needed for constipation Taking As Needed    sertraline (ZOLOFT) 50 MG tablet Take 75 mg by mouth daily. 0900 1/29/2025 Morning    simethicone (MYLICON) 125 MG chewable tablet Take 125 mg by mouth 3 times daily as needed for intestinal gas Taking As Needed    Vitamin D3 (VITAMIN D, CHOLECALCIFEROL,) 25 mcg (1000 units) tablet Take 1 tablet by mouth daily 0800 1/29/2025 Morning

## 2025-01-29 NOTE — ED PROVIDER NOTES
Emergency Department Note      History of Present Illness     Chief Complaint   Shortness of Breath and Nausea      HPI   Ron Gilmore is a 84 year old male, transported via EMS with history of hypertension, atrial fibrillation, DVT, who presents to the ED for evaluation of shortness of breath and nausea. Patient reports that this morning that he was was feeling feverish and shortness of breath . He states that he is normally on two liters of oxygen. He denies cough, abdominal pain, and vomiting. He endorses that he has not choked on anything.     Independent Historian   None    Review of External Notes   ***    Past Medical History     Medical History and Problem List   Benign prostatic hyperplasia without lower urinary tract symptoms   Difficulty walking  Hypertension  Hematuria  Hyperlipidemia  Depression  Muscle weakness  Atrial fibulation  Fracture to right talus  Fracture to***  COPD  Aspiration pneumonia   CAP  Type 2 diabetes  DVT  Chronic respiratory failure hypoxia  UTI  Anemia  CVA  Chronic indwelling chirinos catheter  DNR  Febrile illness  Gout  Hemiparesis  Lactic acidosis  Metabolic encephalopathy  Morbid obesity  Obstructive right sided ureteral stone resulting in hydronephrosis  Sepsis  SIRS  Urinary retention  Vasovagal episode  Gastrointestinal hemorrhage  Weakness of left upper extremities    Medications   Allopurinol   Atorvastatin   Bisacodyl   Formoterol   Furosemide   Ipratropium-albuterol   Loperamide   Melatonin   Methenamine hippurate   Metoprolol tartrate  Pantoprazole   Senna-docusate   Sertraline   Simethicone   Cephalexin  Paroxetine  Triamcinolon    Surgical History   Appendectomy colonoscopy x 2   Cholecystectomy  Arthroscopy shoulder rotator cuff repair cataracts  Cytoscopy x 2   Ep loop recorder implant   Esophagoscopy, gastroscopy, duodenoscopy x 2   Eye surgery  Ir IVC filter placement  Nephrostomy tube placement right   Ureteral stent placement right  Joint replacement  "hip  Knee surgery  Laminectomy lumbar one level  Laser holmium lithotripsy ureter, insert stent   Tonsillectomy    Physical Exam     Patient Vitals for the past 24 hrs:   BP Temp Temp src Pulse Resp SpO2 Height Weight   01/29/25 0957 -- -- -- -- -- 92 % -- --   01/29/25 0940 (!) 134/101 99.4  F (37.4  C) Oral 115 22 92 % 1.829 m (6') 90.7 kg (200 lb)     Physical Exam  ***    Diagnostics     Lab Results   Labs Ordered and Resulted from Time of ED Arrival to Time of ED Departure   CBC WITH PLATELETS AND DIFFERENTIAL - Abnormal       Result Value    WBC Count 16.7 (*)     RBC Count 4.59      Hemoglobin 11.3 (*)     Hematocrit 38.0 (*)     MCV 83      MCH 24.6 (*)     MCHC 29.7 (*)     RDW 19.8 (*)     Platelet Count 199      % Neutrophils 88      % Lymphocytes 4      % Monocytes 7      % Eosinophils 1      % Basophils 0      % Immature Granulocytes 1      NRBCs per 100 WBC 0      Absolute Neutrophils 14.7 (*)     Absolute Lymphocytes 0.7 (*)     Absolute Monocytes 1.1      Absolute Eosinophils 0.1      Absolute Basophils 0.0      Absolute Immature Granulocytes 0.1      Absolute NRBCs 0.0     ISTAT GASES LACTATE VENOUS POCT - Abnormal    Lactic Acid POCT 1.9      Bicarbonate Venous POCT 31 (*)     O2 Sat, Venous POCT 76 (*)     pCO2 Venous POCT 55 (*)     pH Venous POCT 7.36      pO2 Venous POCT 44      Base Excess/Deficit (+/-) POCT 5.0 (*)    BASIC METABOLIC PANEL   INFLUENZA A/B, RSV AND SARS-COV2 PCR       Imaging   No orders to display       EKG   ECG taken at ***, ECG read at ***  ***   *** as compared to prior, dated ***/***/***.  Rate *** bpm. AR interval *** ms. QRS duration *** ms. QT/QTc ***/*** ms. P-R-T axes *** *** ***.    Independent Interpretation   {IndependentReview:942541::\"None\"}    ED Course      Medications Administered   Medications - No data to display    Procedures   Procedures     Discussion of Management   {Consults/Care Discussions:329913::\"None\"}    ED Course   ED Course as of 01/29/25 1021 " "  Wed Jan 29, 2025   1014 I obtained history and examined the patient as noted above.        Additional Documentation  {EPPAAdditionalPhrase:909731::\"None\"}    Medical Decision Making / Diagnosis     CMS Diagnoses: {Sepsis/Septic Shock/Stemi/Stroke:245015::\"None\"}    MIPS       {EPPA MIPS:897923::\"None\"}    LIZANDRO Gilmore is a 84 year old male ***    Disposition   {EPPAF Dispo:795099}    Diagnosis   No diagnosis found.     Discharge Medications   New Prescriptions    No medications on file         {Provider or scribe signature:655709}    " complexes  Right bundle branch block  Abnormal ECG  When compared with ECG of 29-Oct-2024 10:24,  Premature ventricular complexes are now Present  Inverted T waves have replaced nonspecific T wave abnormality in Anterior leads  Confirmed by GENERATED REPORT, COMPUTER ([id number]),  DALLAS RODRIGUES ([id number]) on 12/26/2024 4:03:17 PM           Independent Interpretation   CXR: Interstitial opacities.    ED Course      Medications Administered   Medications   sodium chloride 0.9 % infusion ( Intravenous $New Bag 1/29/25 2005)   sodium chloride 0.9% BOLUS 1,000 mL (0 mLs Intravenous Stopped 1/29/25 1350)   lidocaine (XYLOCAINE) 2 % external gel 10 mL (10 mLs Urethral $Given 1/29/25 1142)   azithromycin (ZITHROMAX) 500 mg vial to attach to  mL bag (0 mg Intravenous Stopped 1/29/25 1415)   piperacillin-tazobactam (ZOSYN) 4.5 g vial to attach to  mL bag (0 g Intravenous Stopped 1/29/25 1335)   azithromycin (ZITHROMAX) tablet 250 mg (250 mg Oral $Given 2/2/25 0824)       Procedures   Procedures     Discussion of Management   Admitting Hospitalist,      ED Course   ED Course as of 01/29/25 1021   Wed Jan 29, 2025   1014 I obtained history and examined the patient as noted above.        Additional Documentation  None    Medical Decision Making / Diagnosis     CMS Diagnoses: The patient has signs of sepsis   Sepsis ED evaluation   The patient has signs of sepsis as evidenced by:  1. Presence of 2 SIRS criteria, suspected infection, AND  2. Organ dysfunction: Sepsis work up in progress. Will continue to monitor for signs of organ dysfunction    Sepsis Care Initiation: Starting on 02/04/25 at  on 02/08/25, until specified. Prior to this documentation, sepsis, severe sepsis, or septic shock was NOT thought to be a significant cause of illness. This order represents the first time infection was seriously considered to be affecting the patient.    Lactic Acid Results:  Recent Labs   Lab Test 01/29/25  0952 12/26/24  9407  "10/11/24  1800   LACT 1.9 1.5 1.9       3 Hour Bundle 6 Hour Bundle (Reassessment)   Blood Cultures before IV Antibiotics: Yes  Antibiotics given: see below  Prehospital fluid volume (mL):                     Total fluids given (ED +Pre-hospital):  The patient responded to a lesser volume of IV fluids. The initial volume ordered was   mL.    Repeat Lactic Acid Level: Ordered by reflex for 2 hours after initial lactic acid collection.  Vasopressors: MAP>65 after initial IVF bolus, will continue to monitor fluid status and vital signs.  Repeat perfusion exam: I attest to having performed a repeat sepsis exam and assessment of perfusion at  1150 .   BMI Readings from Last 1 Encounters:   02/03/25 28.85 kg/m        Anti-infectives (From admission through now)      Start     Dose/Rate Route Frequency Ordered Stop    01/29/25 1220  piperacillin-tazobactam (ZOSYN) 4.5 g vial to attach to  mL bag        Note to Pharmacy: For [facility], [facility] and [facility]: For Zosyn-naive patients, use the \"Zosyn initial dose + extended infusion\" order panel.    4.5 g  over 30 Minutes Intravenous ONCE 01/29/25 1215 01/29/25 1335    01/29/25 1135  azithromycin (ZITHROMAX) 500 mg vial to attach to  mL bag         500 mg  over 1 Hours Intravenous ONCE 01/29/25 1133 01/29/25 1415             and None    MIPS       None    [facility]   Ron Gilmore is a 84 year old male who presents hypoxic requiring 10 L nasal cannula oxygen.  Broad workup is initiated.  Chest x-ray is concerning for pneumonia and interstitial opacities.  BNP normal.  Given concern for aspiration he was given antibiotics and given his significant increase in oxygen requirement discussed plan for admission.  He is in agreement.  Spoke with hospital service who accepts for admission and he is in stable condition at time of admission.    Disposition   The patient was admitted to the hospital.     Diagnosis     ICD-10-CM    1. Urinary tract infection without hematuria, site unspecified  " N39.0 ampicillin (PRINCIPEN) 500 MG capsule      2. Acute on chronic respiratory failure with hypoxia (H)  J96.21       3. Pneumonia due to infectious organism, unspecified laterality, unspecified part of lung  J18.9       4. Pneumonia of both lower lobes due to infectious organism  J18.9 dextromethorphan-guaiFENesin (MUCINEX DM)  MG 12 hr tablet      5. Mild episode of recurrent major depressive disorder  F33.0 QUEtiapine (SEROQUEL) 25 MG tablet     DISCONTINUED: QUEtiapine (SEROQUEL) 25 MG tablet           Discharge Medications   New Prescriptions    No medications on file         MD Jaz Sheriff Nicholas J, MD  02/08/25 1193

## 2025-01-29 NOTE — ED NOTES
[facility]  ED Nurse Handoff Report    ED Chief complaint: Shortness of Breath and Nausea      ED Diagnosis:   Final diagnoses:   None       Code Status: Hospitalist to discuss with patient    Allergies: No Known Allergies    Patient Story: Pt BIB EMS, from [facility], with hypoxia (70s% on RA to 2L O2 via NC), nausea, and fever. Hx of aspiration pneumonia. SpO2 95% on 10L O2 via non-rebreather. 4 mg zofran administered by EMS. /77.  bpm. Pt reports these symptoms started yesterday.      .      Pt has a chirinos catheter that was changed last Wednesday, per pt statement.  Focused Assessment:  SpO2 92-94% on 6L O2 via NC    Treatments and/or interventions provided:   Medications   sodium chloride 0.9% BOLUS 1,000 mL (has no administration in time range)   lidocaine (XYLOCAINE) 2 % external gel 10 mL (has no administration in time range)   cefTRIAXone (ROCEPHIN) 2 g vial to attach to  ml bag for ADULTS or NS 50 ml bag for PEDS (has no administration in time range)   azithromycin (ZITHROMAX) 500 mg vial to attach to  mL bag (has no administration in time range)     XR Chest 2 Views   Preliminary Result   IMPRESSION:    Cardiomegaly is accentuated by low lung volumes. Diffuse interstitial infiltrates may be due to edema and CHF. Infiltrate or small effusion is present in the left base. No pneumothorax. No significant bony abnormalities.        Patient's response to treatments and/or interventions: Pending, weaning O2 as appropriate    To be done/followed up on inpatient unit:  Per MD orders    Does this patient have any cognitive concerns?:  none    Activity level - Baseline/Home:  Unknown  Activity Level - Current:   Total Care    Patient's Preferred language: English   Needed?: No    Isolation: Contact   Infection: VRE  Patient tested for COVID 19 prior to admission: YES  Bariatric?: No    Vital Signs:   Vitals:    01/29/25 0940 01/29/25 0957 01/29/25 1000  01/29/25 1100   BP: (!) 134/101  130/81 138/82   Pulse: 115  112 105   Resp: 22  22 21   Temp: 99.4  F (37.4  C)      TempSrc: Oral      SpO2: 92% 92% 94% 93%   Weight: 90.7 kg (200 lb)      Height: 1.829 m (6')          Cardiac Rhythm:Cardiac Rhythm: Sinus tachycardia    Was the PSS-3 completed:   Yes  What interventions are required if any?                For the majority of the shift this patient's behavior was Green.   Behavioral interventions performed were none.

## 2025-01-29 NOTE — H&P
[facility]    History and Physical - Hospitalist Service       Date of Admission:  1/29/2025    Assessment & Plan      Ron Gilmore is a 84 year old male with medical history significant for CVA with left-sided weakness, dysphagia, chronic indwelling Cardona catheter with UTI and sepsis, DM2, BRAD, COPD and chronic hypoxic respiratory failure, depression and anxiety, BPH, GERD, gout was sent to the ER for evaluation of fever and worsening hypoxia.  Found to have pneumonia and admitted on 1/29/2025.      Pneumonia, suspect aspiration with sepsis  Acute on chronic hypoxic respiratory failure  Suspect CAUTI  Reported fever 101 per patient, dyspnea and fatigue.  Leukocytosis 16.7, mildly tachycardic.  CXR with prominent LLL opacity but also has diffuse interstitial opacities bilaterally, concerning pulmonary edema but clinically appears dehydrated, proBNP normal and no overt crackles.  Hypoxic needing 10 LPM, now down to 6 in ER.  Baseline 2 LPM O2 need.  -Admit to inpatient  -Blood cultures and urine culture sent, UA grew Pseudomonas and Enterococcus in the past  -IV Zosyn and azithromycin  -Supplemental O2, wean as able  -Received 1L NS, although CXR shows interstitial opacity concerning for fluid overload, appears dry clinically, tolerated bolus fluid in ER,  cc/hour x 5 hours and monitor respiratory status  -Follow CBC, fever trend, continuous pulse ox.      COPD with chronic hypoxic respiratory failure  BRAD  No excessive wheezing.  Uses supplemental O2 2 LPM at baseline.  Increased hypoxia due to pneumonia as above.  No concern for COPD exacerbation.  -Continue PTA inhalers last nebs    BPH  Chronic indwelling Cardona catheter  Per ER physician, indwelling catheter was replaced in ER    History of CVA, residual left-sided anna-plegia  Hypertension  Hyperlipidemia  Continue PTA aspirin, Lipitor, metoprolol  -Hold PTA Lasix for now.  -Patient reports he is on regular diet.  Monitor for  signs of aspiration, SLP eval if noted    Depression and anxiety  -Continue PTA sertraline    DM2  Noted HbA1c 6.4 last month, prediabetes.  Not on medication.  Blood sugar 168 in ER  -Insulin sliding scale    GERD  -Continue PTA PPI    Gout  -Continue PTA allopurinol    Chronic anemia  -Hemoglobin has fluctuated, 9-10 is likely his baseline.  Presents with 11.3.  Likely hemoconcentrated, anticipate some drop with IV fluid and given his acute illness, monitor        Diet:  Regular   DVT Prophylaxis: Enoxaparin (Lovenox) SQ  Cardona Catheter: PRESENT, indication:    Lines: None     Cardiac Monitoring: None  Code Status:  DNR/DNI, discussed with patient, POLST reviewed.    Clinically Significant Risk Factors Present on Admission               # Hypoalbuminemia: Lowest albumin = 3.4 g/dL at 1/29/2025  9:50 AM, will monitor as appropriate              # Overweight: Estimated body mass index is 27.12 kg/m  as calculated from the following:    Height as of this encounter: 1.829 m (6').    Weight as of this encounter: 90.7 kg (200 lb).       # Financial/Environmental Concerns:          Disposition Plan     Medically Ready for Discharge: Anticipated in 2-4 Days           Parish Rawls MD  Hospitalist Service  [facility]  Securely message with [employer] (more info)  Text page via [facility] Paging/Directory     ______________________________________________________________________    Chief Complaint   Fever, shortness of breath, nausea    History is obtained from the patient, chart review, discussion with ER MD    History of Present Illness     Ron Gilmore is a 84 year old male with medical history significant for CVA with left-sided weakness, dysphagia, chronic indwelling Cardona catheter with UTI and sepsis, DM2, BRAD, COPD and chronic hypoxic respiratory failure, depression and anxiety, BPH, GERD, gout was sent to the ER for evaluation of fever and worsening hypoxia.    According to the patient, he was  in his usual state until he went to bed last night.  This morning he woke up, was feeling unwell, reports fever of 101, was short of breath and with mild headache and nausea.  No vomiting, abdominal pain, diarrhea.  No cough or purulent sputum.  No sinus congestion or sore throat.  Per EMS, he was hypoxic to 70% on room air.  Patient normally is on 2 LPM NC O2.  He was placed on 10 LPM of oxygen and transported to ER where it has been weaned down to 6 LPM.    In ER, his Tmax was 99.4, mildly tachycardic.  Labs showed leukocytosis of 16.7.  Chest x-ray showed diffuse bilateral interstitial opacity with more prominent opacity on left lower lobe.  CHF versus pneumonia.  Blood sugar 178.  VBG showed pH of 7.36.  pCO2 48.  Lactic acid negative.  proBNP not elevated.  Troponin negative.  Cardona catheter was changed and UA was obtained which shows pyuria, hematuria, WBC clumps, bacteria, large leukocyte esterase.  COVID-19, influenza AMB and RSV negative.    Hospitalist contacted for admission.  Rocephin and azithromycin IV was ordered, I have requested IV Zosyn.  NS 1L was given.          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    Unsteady gait     Urinary retention with incomplete bladder emptying     UTI (urinary tract infection)     Vasovagal episode        Past Surgical History   Past Surgical History:   Procedure Laterality Date    APPENDECTOMY OPEN       ARTHROSCOPY SHOULDER ROTATOR CUFF REPAIR      cataracts Bilateral     CHOLECYSTECTOMY      COLONOSCOPY  1986    COLONOSCOPY N/A 5/29/2021    Procedure: COLONOSCOPY;  Surgeon: Kofi Davis MD;  Location:  GI    CYSTOSCOPY  10/19/2011    Procedure:CYSTOSCOPY; CYSTOSCOPY, BLADDER STONE REMOVAL; Surgeon:ROB SAWYER; Location: OR    CYSTOSCOPY, TRANSURETHRAL RESECTION (TUR) PROSTATE, COMBINED N/A 2/21/2018    Procedure: COMBINED CYSTOSCOPY, TRANSURETHRAL RESECTION (TUR) PROSTATE;  COMBINED CYSTOSCOPY, TRANSURETHRAL RESECTION (TUR) PROSTATE ;  Surgeon: Rob Sawyer MD;  Location:  OR    EP LOOP RECORDER IMPLANT N/A 1/20/2020    Procedure: EP Loop Recorder Implant;  Surgeon: Evgeny Parisi MD;  Location:  HEART CARDIAC CATH LAB    ESOPHAGOSCOPY, GASTROSCOPY, DUODENOSCOPY (EGD), COMBINED N/A 5/28/2021    Procedure: ESOPHAGOGASTRODUODENOSCOPY (EGD);  Surgeon: Aurora Waterman MD;  Location:  GI    ESOPHAGOSCOPY, GASTROSCOPY, DUODENOSCOPY (EGD), DILATATION, COMBINED N/A 9/24/2022    Procedure: ESOPHAGOGASTRODUODENOSCOPY, WITH DILATION;  Surgeon: Kofi Davis MD;  Location:  GI    EYE SURGERY      right lid surgery     IR IVC FILTER PLACEMENT  5/24/2021    IR NEPHROSTOMY TUBE PLACEMENT RIGHT  3/9/2021    IR URETERAL STENT PLACEMENT RIGHT  3/16/2021    JOINT REPLACEMENT Right     HIP    KNEE SURGERY Bilateral     LAMINECTOMY LUMBAR ONE LEVEL      LASER HOLMIUM LITHOTRIPSY URETER(S), INSERT STENT, COMBINED Right 4/14/2021    Procedure: CYSTOSCOPY, BLADDER STONE REMOVAL, RIGHT URETEROSCOPY, HOLMIUM LASER LITHOTRIPSY, AND RIGHT STENT REMOVAL, RIGHT RETROGRADE;  Surgeon: Rob Sawyer MD;  Location:  OR    TONSILLECTOMY         Prior to Admission Medications   Prior to Admission Medications   Prescriptions Last Dose Informant Patient Reported? Taking?   Vitamin D3 (VITAMIN D, CHOLECALCIFEROL,) 25 mcg (1000 units) tablet 1/29/2025 [facility] Yes Yes   Sig: Take 1 tablet  by mouth daily 0800   acetaminophen (TYLENOL) 325 MG tablet  Nursing Home Yes Yes   Sig: Take 650 mg by mouth every 4 hours as needed for mild pain as needed for pain/fever Max dose 3000mg/24hr   allopurinol (ZYLOPRIM) 300 MG tablet 1/29/2025 [facility] Yes Yes   Sig: Take 300 mg by mouth every morning 0900   ammonium lactate (LAC-HYDRIN) 12 % external lotion  Nursing Home Yes Yes   Sig: Apply topically daily Apply a thin film to bilateral feet and legs   aspirin (ASA) 81 MG EC tablet 1/29/2025 [facility] No Yes   Sig: Take 1 tablet (81 mg) by mouth daily   atorvastatin (LIPITOR) 10 MG tablet 1/29/2025 [facility] Yes Yes   Sig: Take 10 mg by mouth every morning 0900   bisacodyl (DULCOLAX) 10 MG suppository  [facility] Yes Yes   Sig: Place 10 mg rectally three times a week. Mon/Wed/Fri   formoterol (PERFOROMIST) 20 MCG/2ML neb solution  [facility] Yes Yes   Sig: Take 20 mcg by nebulization every 12 hours 1000, 2300   furosemide (LASIX) 20 MG tablet 1/29/2025 [facility] Yes Yes   Sig: Take 20 mg by mouth daily 0900   hypromellose (ARTIFICIAL TEARS) 0.5 % SOLN ophthalmic solution 1/29/2025 [facility] Yes Yes   Sig: Place 2 drops into both eyes 2 times daily 0800, 2000   hypromellose (ARTIFICIAL TEARS) 0.5 % SOLN ophthalmic solution  [facility] Yes Yes   Sig: Place 2 drops into both eyes 2 times daily as needed for dry eyes   ipratropium - albuterol 0.5 mg/2.5 mg/3 mL (DUONEB) 0.5-2.5 (3) MG/3ML neb solution  [facility] Yes Yes   Sig: Take 1 vial by nebulization 3 times daily as needed for shortness of breath, wheezing or cough.   ipratropium-albuterol (COMBIVENT RESPIMAT)  MCG/ACT inhaler 1/29/2025 [facility] Yes Yes   Sig: Inhale 1 puff into the lungs 4 times daily 0900, 1200 ,1600 ,2000   loperamide (IMODIUM) 2 MG capsule  Nursing Home Yes Yes   Sig: Take 2 mg by mouth every 6 hours as needed for diarrhea   melatonin 3 MG tablet  [facility]  Yes Yes   Sig: Take 3 mg by mouth at bedtime. May repeat dose if not effective   methenamine hippurate (HIPREX) 1 g tablet 1/29/2025 [facility] Yes Yes   Sig: Take 1 g by mouth 2 times daily 0900, 2000   metoprolol tartrate (LOPRESSOR) 25 MG tablet 1/29/2025 [facility] No Yes   Sig: Take 0.5 tablets (12.5 mg) by mouth 2 times daily   pantoprazole (PROTONIX) 40 MG EC tablet 1/29/2025 [facility] Yes Yes   Sig: Take 40 mg by mouth 2 times daily 0900, 2000   senna-docusate (SENOKOT-S/PERICOLACE) 8.6-50 MG tablet 1/29/2025 [facility] Yes Yes   Sig: Take 2 tablets by mouth 2 times daily. 0900, 2000   senna-docusate (SENOKOT-S/PERICOLACE) 8.6-50 MG tablet  [facility]urs[facility] Home Yes Yes   Sig: Take 1 tablet by mouth daily as needed for constipation   sertraline (ZOLOFT) 50 MG tablet 1/29/2025 [facility] Yes Yes   Sig: Take 75 mg by mouth daily. 0900   simethicone (MYLICON) 125 MG chewable tablet  [facility]urs[facility] Home Yes Yes   Sig: Take 125 mg by mouth 3 times daily as needed for intestinal gas      Facility-Administered Medications: None        Review of Systems    The 10 point Review of Systems is negative other than noted in the HPI or here.       Physical Exam   Vital Signs: Temp: 99.4  F (37.4  C) Temp src: Oral BP: 138/82 Pulse: 105   Resp: 21 SpO2: 93 % O2 Device: Nasal cannula Oxygen Delivery: 6 LPM  Weight: 200 lbs 0 oz    General: AAOx3, very pleasant, appears comfortable.  HEENT: PERRLA EOMI. Mucosa appears quite dry  Lungs: Bilateral equal air entry. Clear to auscultation anteriorly, coarse breath sounds posteriorly, no wheezing, normal work of breathing.   CVS: S1S2 regular, mildly tachycardic, no murmur .   Abdomen: Soft, NT, ND. BS heard.  MSK: Dense left hemiplegia noted.  Neuro: AAOX3.  Face symmetrical, dense left hemiplegia with contracture noted.  Skin: No rash.       Medical Decision Making       80 MINUTES SPENT BY ME on the date of service doing chart review, history,  exam, documentation & further activities per the note.      Data     I have personally reviewed the following data over the past 24 hrs:    16.7 (H)  \   11.3 (L)   / 199     136 99 18.8 /  178 (H)   4.7 29 0.86 \     ALT: 12 AST: 21 AP: 92 TBILI: 0.3   ALB: 3.4 (L) TOT PROTEIN: 7.8 LIPASE: N/A     Trop: 22 BNP: 257     Procal: N/A CRP: N/A Lactic Acid: 1.9         Imaging results reviewed over the past 24 hrs:   Recent Results (from the past 24 hours)   XR Chest 2 Views    Narrative    EXAM: XR CHEST 2 VIEWS  LOCATION: [facility]  DATE: 1/29/2025    INDICATION: Fever, cough, hypoxia.  COMPARISON: 12/28/2024.      Impression    IMPRESSION:   Cardiomegaly is accentuated by low lung volumes. Diffuse interstitial infiltrates may be due to edema and CHF. Infiltrate or small effusion is present in the left base. No pneumothorax. No significant bony abnormalities.

## 2025-01-29 NOTE — PROGRESS NOTES
Care Management Follow Up    Length of Stay (days): 0    Expected Discharge Date: 01/31/2025     Concerns to be Addressed:       Patient plan of care discussed at interdisciplinary rounds: No    Anticipated Discharge Disposition:                Anticipated Discharge Services:    Anticipated Discharge DME:      Patient/family educated on Medicare website which has current facility and service quality ratings:    Education Provided on the Discharge Plan:    Patient/Family in Agreement with the Plan:      Referrals Placed by CM/SW:    Private pay costs discussed: Not applicable    Discussed  Partnership in Safe Discharge Planning  document with patient/family: No     Handoff Completed: No, handoff not indicated or clinically appropriate    Additional Information:   received voicemail from complex care manager Ava with [facility] regarding patient hospital stay and history.    Ava stated she works with Amie Pineda, the primary care physician who sees patient at [facility]. Ava stated patient resides in the [place] building of the facility, and he was supposed to be seen today by PCP. Patient came to hospital before appointment occurred. Ava stated that patient has been to ED numerous times and has usually returned medically stable to facility. Ava noted now there has been a declining trend in terms of his health. Ava stated patient has a long term catheter and receives home care from [facility], including 2L oxygen. Ava stated at a baseline patient is bedbound and that conversations regarding palliative care have begun. Ava inquired if patient admitted to hospital and if hospice appropriate. Ava stated patient likely will be recommended to begin comfort cares from PCP moving forward.    Writer called Ava back and left voicemail inviting call back to discuss patient plan of care. Writer received call from Ava and then shared patient has been admitted to  hospital. Writer stated there is a hospice team on site and physician may also broach topic with patient/patient's family. Writer stated he would check in with patient and patient's family to determine where they are at in terms of next steps. Writer stated if patient admits to hospital prior to these conversations occurring that Care Management team able to assist as well.      Next Steps: Follow for discharge planning; Connect with patient/patient's family to determine thoughts on hospice; Update Ava ([phone]) if hospice elected    TIFFANY Mcdonnell  [facility]  Inpatient Care Management

## 2025-01-29 NOTE — ED NOTES
Per Dr. Sebastian RN to change out chronic chirinos and replace with new chirinos, prior to obtaining UA. MD ordered chronic chirinos management. RN notified receiving SOFIA Steen.

## 2025-01-30 VITALS
SYSTOLIC BLOOD PRESSURE: 119 MMHG | DIASTOLIC BLOOD PRESSURE: 55 MMHG | BODY MASS INDEX: 27.23 KG/M2 | HEIGHT: 72 IN | RESPIRATION RATE: 18 BRPM | WEIGHT: 201.06 LBS | HEART RATE: 96 BPM | OXYGEN SATURATION: 94 % | TEMPERATURE: 98 F

## 2025-01-30 LAB
ALBUMIN SERPL BCG-MCNC: 3 G/DL (ref 3.5–5.2)
ALP SERPL-CCNC: 72 U/L (ref 40–150)
ALT SERPL W P-5'-P-CCNC: 9 U/L (ref 0–70)
ANION GAP SERPL CALCULATED.3IONS-SCNC: 5 MMOL/L (ref 7–15)
AST SERPL W P-5'-P-CCNC: 13 U/L (ref 0–45)
BACTERIA SPT CULT: NORMAL
BACTERIA UR CULT: ABNORMAL
BASOPHILS # BLD AUTO: 0 10E3/UL (ref 0–0.2)
BASOPHILS NFR BLD AUTO: 0 %
BILIRUB SERPL-MCNC: 0.5 MG/DL
BUN SERPL-MCNC: 17.1 MG/DL (ref 8–23)
CALCIUM SERPL-MCNC: 8.6 MG/DL (ref 8.8–10.4)
CHLORIDE SERPL-SCNC: 102 MMOL/L (ref 98–107)
CREAT SERPL-MCNC: 0.85 MG/DL (ref 0.67–1.17)
EGFRCR SERPLBLD CKD-EPI 2021: 87 ML/MIN/1.73M2
EOSINOPHIL # BLD AUTO: 0.4 10E3/UL (ref 0–0.7)
EOSINOPHIL NFR BLD AUTO: 4 %
ERYTHROCYTE [DISTWIDTH] IN BLOOD BY AUTOMATED COUNT: 20 % (ref 10–15)
GLUCOSE BLDC GLUCOMTR-MCNC: 105 MG/DL (ref 70–99)
GLUCOSE BLDC GLUCOMTR-MCNC: 107 MG/DL (ref 70–99)
GLUCOSE BLDC GLUCOMTR-MCNC: 143 MG/DL (ref 70–99)
GLUCOSE BLDC GLUCOMTR-MCNC: 93 MG/DL (ref 70–99)
GLUCOSE SERPL-MCNC: 116 MG/DL (ref 70–99)
GRAM STAIN RESULT: NORMAL
HCO3 SERPL-SCNC: 30 MMOL/L (ref 22–29)
HCT VFR BLD AUTO: 34.7 % (ref 40–53)
HGB BLD-MCNC: 10 G/DL (ref 13.3–17.7)
IMM GRANULOCYTES # BLD: 0.1 10E3/UL
IMM GRANULOCYTES NFR BLD: 1 %
L PNEUMO1 AG UR QL IA: NEGATIVE
LYMPHOCYTES # BLD AUTO: 0.9 10E3/UL (ref 0.8–5.3)
LYMPHOCYTES NFR BLD AUTO: 9 %
MCH RBC QN AUTO: 24.8 PG (ref 26.5–33)
MCHC RBC AUTO-ENTMCNC: 28.8 G/DL (ref 31.5–36.5)
MCV RBC AUTO: 86 FL (ref 78–100)
MONOCYTES # BLD AUTO: 1 10E3/UL (ref 0–1.3)
MONOCYTES NFR BLD AUTO: 10 %
NEUTROPHILS # BLD AUTO: 7.6 10E3/UL (ref 1.6–8.3)
NEUTROPHILS NFR BLD AUTO: 76 %
NRBC # BLD AUTO: 0 10E3/UL
NRBC BLD AUTO-RTO: 0 /100
PLATELET # BLD AUTO: 170 10E3/UL (ref 150–450)
POTASSIUM SERPL-SCNC: 5.1 MMOL/L (ref 3.4–5.3)
PROT SERPL-MCNC: 6.8 G/DL (ref 6.4–8.3)
RBC # BLD AUTO: 4.03 10E6/UL (ref 4.4–5.9)
S PNEUM AG SPEC QL: NEGATIVE
SODIUM SERPL-SCNC: 137 MMOL/L (ref 135–145)
SPECIMEN TYPE: NORMAL
WBC # BLD AUTO: 10 10E3/UL (ref 4–11)

## 2025-01-30 PROCEDURE — 85018 HEMOGLOBIN: CPT | Performed by: HOSPITALIST

## 2025-01-30 PROCEDURE — 250N000009 HC RX 250: Performed by: HOSPITALIST

## 2025-01-30 PROCEDURE — 120N000001 HC R&B MED SURG/OB

## 2025-01-30 PROCEDURE — 250N000013 HC RX MED GY IP 250 OP 250 PS 637: Performed by: HOSPITALIST

## 2025-01-30 PROCEDURE — 94640 AIRWAY INHALATION TREATMENT: CPT

## 2025-01-30 PROCEDURE — 999N000157 HC STATISTIC RCP TIME EA 10 MIN

## 2025-01-30 PROCEDURE — 94640 AIRWAY INHALATION TREATMENT: CPT | Mod: 76

## 2025-01-30 PROCEDURE — 36415 COLL VENOUS BLD VENIPUNCTURE: CPT | Performed by: HOSPITALIST

## 2025-01-30 PROCEDURE — 87070 CULTURE OTHR SPECIMN AEROBIC: CPT | Performed by: HOSPITALIST

## 2025-01-30 PROCEDURE — 85004 AUTOMATED DIFF WBC COUNT: CPT | Performed by: HOSPITALIST

## 2025-01-30 PROCEDURE — 99232 SBSQ HOSP IP/OBS MODERATE 35: CPT | Performed by: INTERNAL MEDICINE

## 2025-01-30 PROCEDURE — 84155 ASSAY OF PROTEIN SERUM: CPT | Performed by: HOSPITALIST

## 2025-01-30 PROCEDURE — 85041 AUTOMATED RBC COUNT: CPT | Performed by: HOSPITALIST

## 2025-01-30 PROCEDURE — 250N000011 HC RX IP 250 OP 636: Performed by: HOSPITALIST

## 2025-01-30 PROCEDURE — 80053 COMPREHEN METABOLIC PANEL: CPT | Performed by: HOSPITALIST

## 2025-01-30 RX ORDER — PIPERACILLIN SODIUM, TAZOBACTAM SODIUM 4; .5 G/20ML; G/20ML
4.5 INJECTION, POWDER, LYOPHILIZED, FOR SOLUTION INTRAVENOUS EVERY 6 HOURS
Status: DISPENSED | OUTPATIENT
Start: 2025-01-30

## 2025-01-30 RX ORDER — GUAIFENESIN AND DEXTROMETHORPHAN HYDROBROMIDE 600; 30 MG/1; MG/1
1 TABLET, EXTENDED RELEASE ORAL 2 TIMES DAILY PRN
Status: ACTIVE | OUTPATIENT
Start: 2025-01-30

## 2025-01-30 RX ADMIN — METOPROLOL TARTRATE 12.5 MG: 25 TABLET, FILM COATED ORAL at 10:40

## 2025-01-30 RX ADMIN — SERTRALINE HYDROCHLORIDE 75 MG: 50 TABLET ORAL at 10:39

## 2025-01-30 RX ADMIN — ASPIRIN 81 MG: 81 TABLET, COATED ORAL at 10:39

## 2025-01-30 RX ADMIN — SENNOSIDES AND DOCUSATE SODIUM 2 TABLET: 50; 8.6 TABLET ORAL at 21:00

## 2025-01-30 RX ADMIN — AZITHROMYCIN DIHYDRATE 250 MG: 250 TABLET ORAL at 10:40

## 2025-01-30 RX ADMIN — UMECLIDINIUM BROMIDE AND VILANTEROL TRIFENATATE 1 PUFF: 62.5; 25 POWDER RESPIRATORY (INHALATION) at 08:30

## 2025-01-30 RX ADMIN — SENNOSIDES AND DOCUSATE SODIUM 2 TABLET: 50; 8.6 TABLET ORAL at 10:39

## 2025-01-30 RX ADMIN — PIPERACILLIN AND TAZOBACTAM 3.38 G: 3; .375 INJECTION, POWDER, FOR SOLUTION INTRAVENOUS at 06:53

## 2025-01-30 RX ADMIN — ATORVASTATIN CALCIUM 10 MG: 10 TABLET, FILM COATED ORAL at 10:40

## 2025-01-30 RX ADMIN — CARBOXYMETHYLCELLULOSE SODIUM 2 DROP: 5 SOLUTION/ DROPS OPHTHALMIC at 08:26

## 2025-01-30 RX ADMIN — Medication 25 MCG: at 10:39

## 2025-01-30 RX ADMIN — FORMOTEROL FUMARATE DIHYDRATE 20 MCG: 20 SOLUTION RESPIRATORY (INHALATION) at 07:54

## 2025-01-30 RX ADMIN — FORMOTEROL FUMARATE DIHYDRATE 20 MCG: 20 SOLUTION RESPIRATORY (INHALATION) at 19:23

## 2025-01-30 RX ADMIN — PIPERACILLIN AND TAZOBACTAM 4.5 G: 4; .5 INJECTION, POWDER, FOR SOLUTION INTRAVENOUS at 12:35

## 2025-01-30 RX ADMIN — PANTOPRAZOLE SODIUM 40 MG: 40 TABLET, DELAYED RELEASE ORAL at 21:00

## 2025-01-30 RX ADMIN — PIPERACILLIN AND TAZOBACTAM 4.5 G: 4; .5 INJECTION, POWDER, FOR SOLUTION INTRAVENOUS at 18:59

## 2025-01-30 RX ADMIN — ALLOPURINOL 300 MG: 300 TABLET ORAL at 10:40

## 2025-01-30 RX ADMIN — ENOXAPARIN SODIUM 40 MG: 40 INJECTION SUBCUTANEOUS at 17:30

## 2025-01-30 RX ADMIN — PIPERACILLIN AND TAZOBACTAM 3.38 G: 3; .375 INJECTION, POWDER, FOR SOLUTION INTRAVENOUS at 01:41

## 2025-01-30 RX ADMIN — PANTOPRAZOLE SODIUM 40 MG: 40 TABLET, DELAYED RELEASE ORAL at 10:40

## 2025-01-30 RX ADMIN — METOPROLOL TARTRATE 12.5 MG: 25 TABLET, FILM COATED ORAL at 21:00

## 2025-01-30 ASSESSMENT — ACTIVITIES OF DAILY LIVING (ADL)
ADLS_ACUITY_SCORE: 73
ADLS_ACUITY_SCORE: 79
ADLS_ACUITY_SCORE: 73
ADLS_ACUITY_SCORE: 79
ADLS_ACUITY_SCORE: 79
ADLS_ACUITY_SCORE: 73
ADLS_ACUITY_SCORE: 71
DEPENDENT_IADLS:: CLEANING;COOKING;LAUNDRY;SHOPPING;MEAL PREPARATION;MONEY MANAGEMENT;MEDICATION MANAGEMENT;TRANSPORTATION;INCONTINENCE
ADLS_ACUITY_SCORE: 73
ADLS_ACUITY_SCORE: 79
ADLS_ACUITY_SCORE: 71
ADLS_ACUITY_SCORE: 79
ADLS_ACUITY_SCORE: 73
ADLS_ACUITY_SCORE: 79
ADLS_ACUITY_SCORE: 73
ADLS_ACUITY_SCORE: 79

## 2025-01-30 NOTE — PROGRESS NOTES
RECEIVING UNIT ED HANDOFF REVIEW    ED Nurse Handoff Report was reviewed by: Augustin Garza RN on January 29, 2025 at 6:02 PM

## 2025-01-30 NOTE — PROGRESS NOTES
[facility]  Hospitalist Progress Note  Steve Rendon MD  01/30/2025    Assessment & Plan   Ron Gilmore is a 84 year old male with medical history significant for CVA with left-sided weakness, dysphagia, chronic indwelling Cardona catheter with UTI and sepsis, DM2, BRAD, COPD and chronic hypoxic respiratory failure, depression and anxiety, BPH, GERD, gout was sent to the ER for evaluation of fever and worsening hypoxia.  Found to have pneumonia and admitted on 1/29/2025.       Pneumonia, suspect aspiration with sepsis  Acute on chronic hypoxic respiratory failure  Suspect CAUTI  Reported fever 101 per patient, dyspnea and fatigue.  Leukocytosis 16.7, mildly tachycardic.  CXR with prominent LLL opacity but also has diffuse interstitial opacities bilaterally, concerning pulmonary edema but clinically appears dehydrated, proBNP normal and no overt crackles.  Hypoxic needing 10 LPM, now down to 6 in ER.  Baseline 2 LPM O2 need.  -Admit to inpatient  -Blood cultures and urine culture sent, UA grew Pseudomonas and Enterococcus in the past  -IV Zosyn and azithromycin  -Supplemental O2, wean as able  -Received 1L NS, although CXR shows interstitial opacity concerning for fluid overload, appears dry clinically, tolerated bolus fluid in ER,  cc/hour x 5 hours and monitor respiratory status  -Follow CBC, fever trend, oxygen back to baseline.  -stop continuous pulse oximetry and telemetry        COPD with chronic hypoxic respiratory failure  BRAD  No excessive wheezing.  Uses supplemental O2 2 LPM at baseline.  Increased hypoxia due to pneumonia as above.  No concern for COPD exacerbation.  -Continue PTA inhalers last nebs  - add mucinex     BPH  Chronic indwelling Cardona catheter  Per ER physician, indwelling catheter was replaced in ER     History of CVA, residual left-sided anna-plegia  Hypertension  Hyperlipidemia  Continue PTA aspirin, Lipitor, metoprolol  -Hold PTA Lasix for  now.  -Patient reports he is on regular diet.  Monitor for signs of aspiration, SLP eval if noted     Depression and anxiety  -Continue PTA sertraline     DM2  Noted HbA1c 6.4 last month, prediabetes.  Not on medication.  Blood sugar 168 in ER  - stop sliding scale insulin, BG ok     GERD  -Continue PTA PPI     Gout  -Continue PTA allopurinol     Chronic anemia  -Hemoglobin has fluctuated, 9-10 is likely his baseline.  Presents with 11.3.  Likely hemoconcentrated, anticipate some drop with IV fluid and given his acute illness, monitor        Diet:  Regular   DVT Prophylaxis: Enoxaparin (Lovenox) SQ  Cardona Catheter: PRESENT, indication:    Lines: None     Cardiac Monitoring: None  Code Status:  DNR/DNI, discussed with patient, POLST reviewed.        Clinically Significant Risk Factors Present on Admission               # Hypoalbuminemia: Lowest albumin = 3.4 g/dL at 1/29/2025  9:50 AM, will monitor as appropriate               # Overweight: Estimated body mass index is 27.12 kg/m  as calculated from the following:    Height as of this encounter: 1.829 m (6').    Weight as of this encounter: 90.7 kg (200 lb).       # Financial/Environmental Concerns:          Disposition Plan  -- back to Assisted living, he uses a wheel chair     Medically Ready for Discharge:  -- anticipate in 1-2 days    Disposition:     Interval History   -- chart reviewed  -- oxygen back to baseline  -- still not feeling better    -Data reviewed today: I reviewed all new labs and imaging over the last 24 hours. I personally reviewed no images or EKG's today.    Physical Exam    , Blood pressure 117/70, pulse 88, temperature 97.9  F (36.6  C), temperature source Oral, resp. rate 18, height 1.829 m (6'), weight 91.2 kg (201 lb 1 oz), SpO2 94%.  Vitals:    01/29/25 0940 01/30/25 0612   Weight: 90.7 kg (200 lb) 91.2 kg (201 lb 1 oz)     Vital Signs with Ranges  Temp:  [97.9  F (36.6  C)-98.2  F (36.8  C)] 97.9  F (36.6  C)  Pulse:  [62-88] 88  Resp:   [16-18] 18  BP: (117-130)/(65-79) 117/70  SpO2:  [94 %-96 %] 94 %  I/O's Last 24 hours  I/O last 3 completed shifts:  In: 240 [P.O.:240]  Out: 600 [Urine:600]    Constitutional: Awake, alert, cooperative, no apparent distress  Respiratory: Coarse anteriorly  Cardiovascular: Regular rate and rhythm, normal S1 and S2, and no murmur noted  GI: Normal bowel sounds, soft, non-distended, non-tender  Skin/Integumen: No rashes, no cyanosis   Other:      Medications   All medications were reviewed.  Current Facility-Administered Medications   Medication Dose Route Frequency Provider Last Rate Last Admin     Current Facility-Administered Medications   Medication Dose Route Frequency Provider Last Rate Last Admin    allopurinol (ZYLOPRIM) tablet 300 mg  300 mg Oral Parish Berry MD   300 mg at 01/30/25 1040    aspirin EC tablet 81 mg  81 mg Oral Daily Parish Rawls MD   81 mg at 01/30/25 1039    atorvastatin (LIPITOR) tablet 10 mg  10 mg Oral Parish Berry MD   10 mg at 01/30/25 1040    azithromycin (ZITHROMAX) tablet 250 mg  250 mg Oral Daily Parish Rawls MD   250 mg at 01/30/25 1040    [START ON 1/31/2025] bisacodyl (DULCOLAX) suppository 10 mg  10 mg Rectal Once per day on Monday Wednesday Friday Parish Rawls MD        carboxymethylcellulose PF (REFRESH PLUS) 0.5 % ophthalmic solution 2 drop  2 drop Both Eyes BID Parish Rawls MD   2 drop at 01/30/25 0826    enoxaparin ANTICOAGULANT (LOVENOX) injection 40 mg  40 mg Subcutaneous Q24H Parish Rawls MD   40 mg at 01/29/25 1851    formoterol (PERFOROMIST) neb solution 20 mcg  20 mcg Nebulization Q12H Parish Rawls MD   20 mcg at 01/30/25 0754    [Held by provider] furosemide (LASIX) tablet 20 mg  20 mg Oral Daily Parish Rawls MD        insulin aspart (NovoLOG) injection (RAPID ACTING)  1-7 Units Subcutaneous TID Parish Lopez MD        insulin aspart (NovoLOG) injection (RAPID ACTING)  1-5 Units Subcutaneous At Bedtime  Parish Rawls MD        metoprolol tartrate (LOPRESSOR) half-tab 12.5 mg  12.5 mg Oral BID Parish Rawls MD   12.5 mg at 01/30/25 1040    pantoprazole (PROTONIX) EC tablet 40 mg  40 mg Oral BID Parish Rawls MD   40 mg at 01/30/25 1040    piperacillin-tazobactam (ZOSYN) 4.5 g vial to attach to  mL bag  4.5 g Intravenous Q6H Parish Rawls MD   4.5 g at 01/30/25 1235    senna-docusate (SENOKOT-S/PERICOLACE) 8.6-50 MG per tablet 2 tablet  2 tablet Oral BID Parish Rawls MD   2 tablet at 01/30/25 1039    sertraline (ZOLOFT) tablet 75 mg  75 mg Oral Daily Parish Rawls MD   75 mg at 01/30/25 1039    sodium chloride (PF) 0.9% PF flush 3 mL  3 mL Intracatheter Q8H Parish Rawls MD   3 mL at 01/29/25 2005    umeclidinium-vilanterol (ANORO ELLIPTA) 62.5-25 MCG/ACT oral inhaler 1 puff  1 puff Inhalation Daily Parish Rawls MD   1 puff at 01/30/25 0830    Vitamin D3 (CHOLECALCIFEROL) tablet 25 mcg  25 mcg Oral Daily Parish Rawls MD   25 mcg at 01/30/25 1039        Data   Recent Labs   Lab 01/30/25  1222 01/30/25  0855 01/30/25  0853 01/29/25  1713 01/29/25  0950   WBC  --   --  10.0  --  16.7*   HGB  --   --  10.0*  --  11.3*   MCV  --   --  86  --  83   PLT  --   --  170  --  199   NA  --   --  137  --  136   POTASSIUM  --   --  5.1  --  4.7   CHLORIDE  --   --  102  --  99   CO2  --   --  30*  --  29   BUN  --   --  17.1  --  18.8   CR  --   --  0.85  --  0.86   ANIONGAP  --   --  5*  --  8   RAJ  --   --  8.6*  --  9.0   * 105* 116*   < > 178*   ALBUMIN  --   --  3.0*  --  3.4*   PROTTOTAL  --   --  6.8  --  7.8   BILITOTAL  --   --  0.5  --  0.3   ALKPHOS  --   --  72  --  92   ALT  --   --  9  --  12   AST  --   --  13  --  21    < > = values in this interval not displayed.       No results found for this or any previous visit (from the past 24 hours).    Steve Rendon MD  Text Page  (7am to 6pm)

## 2025-01-30 NOTE — PROVIDER NOTIFICATION
MD Notification    Notified Person: MD    Notified Person Name: Dr. Rendon     Notification Date/Time: 1452 1/30/25    Notification Interaction: [employer] webpage    Purpose of Notification: FYI the sputum sample that was collected this AM was contaminated with oral bacteria and if necessary will need to be reordered and collected    Orders Received:    Comments:

## 2025-01-30 NOTE — PLAN OF CARE
Goal Outcome Evaluation:      Plan of Care Reviewed With: patient    Overall Patient Progress: improvingOverall Patient Progress: improving     [id number]  Orientation: AOx4-pt has baseline garbled speech associated with previous CVA. Aggression Stop Light: Green  Activity: A2 T/R. He has baseline L sided weakness. Not OOB this shift.   Diet/BS Checks: Reg with moderately thick liquids,   Tele: N/A  IV Access/Drains: L/R PIV SL  Pain Management: Denies pain this shift   Abnormal VS/Results: VSS 2LNC BL for pt, coarse lung sounds  Bowel/Bladder: Chronic Cardona  Skin/Wounds: Blanchable redness to bottom  Consults: SW   D/C Disposition: Pending    Other Info:   - Contact precautions for VRE maintained   - K+ protocol, recheck in AM  - Pills whole in applesauce  - Sputum sample collected, per lab was contaminated with oral bacteria, will need to be recollected if MD requires results. MD notified

## 2025-01-30 NOTE — CONSULTS
Care Management Initial Consult    General Information  Assessment completed with: Care Team Member, SOFIA Kuhn Case Manager [phone]  Type of CM/SW Visit: Initial Assessment    Primary Care Provider verified and updated as needed: Yes   Readmission within the last 30 days: unable to assess      Reason for Consult: discharge planning, other (see comments) (Elevated Risk Score)  Advance Care Planning: Advance Care Planning Reviewed: present on chart          Communication Assessment  Patient's communication style: spoken language (English or Bilingual)             Cognitive  Cognitive/Neuro/Behavioral: WDL, all  Level of Consciousness: alert, intermittent confusion  Arousal Level: opens eyes spontaneously  Orientation: oriented x 4     Best Language: 1 - Mild to moderate  Speech: garbled, other (see comments) (baseline from prior stroke)    Living Environment:   People in home: facility resident     Current living Arrangements: assisted living  Name of Facility: [facility]   Able to return to prior arrangements: yes ([facility] will only accept back M-F, must be back by 3:00PM)       Family/Social Support:  Care provided by: homecare agency, other (see comments) ([facility] staff)  Provides care for: no one, unable/limited ability to care for self  Marital Status:   Support system: Children, Facility resident(s)/Staff (step daughter)          Description of Support System: Supportive, Involved         Current Resources:   Patient receiving home care services: Yes  Skilled Home Care Services: Skilled Nursing (only for urinary catheter change)     Community Resources: County Programs, County Worker  Equipment currently used at home: grab bar, toilet, grab bar, tub/shower, lift device, hospital bed, wheelchair, power, shower chair  Supplies currently used at home: Incontinence Supplies, Oxygen Tubing/Supplies, Other (chirinos catheter supplies)    Employment/Financial:  Employment Status:  retired        Financial Concerns:             Does the patient's insurance plan have a 3 day qualifying hospital stay waiver?  No    Lifestyle & Psychosocial Needs:  Social Drivers of Health     Food Insecurity: Low Risk  (12/28/2024)    Food Insecurity     Within the past 12 months, did you worry that your food would run out before you got money to buy more?: No     Within the past 12 months, did the food you bought just not last and you didn t have money to get more?: No   Depression: Not on file   Housing Stability: Low Risk  (12/28/2024)    Housing Stability     Do you have housing? : Yes     Are you worried about losing your housing?: No   Tobacco Use: Medium Risk (7/15/2024)    Patient History     Smoking Tobacco Use: Former     Smokeless Tobacco Use: Never     Passive Exposure: Not on file   Financial Resource Strain: Low Risk  (12/28/2024)    Financial Resource Strain     Within the past 12 months, have you or your family members you live with been unable to get utilities (heat, electricity) when it was really needed?: No   Alcohol Use: Not on file   Transportation Needs: Low Risk  (12/28/2024)    Transportation Needs     Within the past 12 months, has lack of transportation kept you from medical appointments, getting your medicines, non-medical meetings or appointments, work, or from getting things that you need?: No   Physical Activity: Not on file   Interpersonal Safety: Low Risk  (12/27/2024)    Interpersonal Safety     Do you feel physically and emotionally safe where you currently live?: Yes     Within the past 12 months, have you been hit, slapped, kicked or otherwise physically hurt by someone?: No     Within the past 12 months, have you been humiliated or emotionally abused in other ways by your partner or ex-partner?: No   Stress: Not on file   Social Connections: Not on file   Health Literacy: Not on file       Functional Status:  Prior to admission patient needed assistance:   Dependent ADLs::  Bathing, Dressing, Grooming, Incontinence, Positioning, Transfers, Wheelchair-with assist, Toileting  Dependent IADLs:: Cleaning, Cooking, Laundry, Shopping, Meal Preparation, Money Management, Medication Management, Transportation, Incontinence       Mental Health Status:          Chemical Dependency Status:                Values/Beliefs:  Spiritual, Cultural Beliefs, Muslim Practices, Values that affect care: yes (Taoist)               Discussed  Partnership in Safe Discharge Planning  document with patient/family: No    Additional Information:  Per consult for Elevated Risk Score and discharge planning, and per chart review, patient resides at [facility].  Called [facility] [phone] and spoke to Hattie RN Care Manager.  Per Hattie, patient resides at the [facility].  Hattie confirmed that pt receives full cares with his ADLs, medication administration, meals, laundry and housekeeping.  Patient is able to feed himself.  Per Hattie, patient requires assistance of 2 and use of a aleks lift to transfer from bed to motorized wheelchair.  Hattie shared that pt is open to [facility] Skilled RN services for pt's chronic chirinos catheter.  Hattie shared that pt using oxygen at 2L per NC and has neb tx 3x per day and prn.  Patient is followed by [facility] at the [facility].  Per Hattie, patient is able to return to [facility] as he is assist of 2.  Hattie shared that they don't accept pt back on the weekend as they do not have Management in the Building on weekends and requested patient return during the week by 3pm.  Discussed that Jose Antonio RIDDLEW spoke with pt's Provider Care team and that hospice was going to be recommended with pt.  Per Hattie, they would be able to accept pt back on hospice.  Called pt's step-daughter López.  López confirmed services that patient is receiving and shared that pt is on an Elderly Waiver.  López shared that any new medication need to be filled at  "[facility] updated and requested that discharge orders be faxed to [phone].  López shared that pt is coming up on his spouses 1st year anniversary of her passing on February 14th.  López mentioned hospice and believes pt may getting ready to transition to this now that he has gone through the 1st holidays/anniversary's.  López shared that pt wants to eat real food and not the \"mushy food\" that speech will most likely recommend.  Discussed with López having Palliative Care consulted to discuss goals of care and having Dr. Rendon call to discuss this further and she was in agreement with this.  Per López, she will be bringing in pt's dentures.  Message Dr. Rendon to call pts step-daughter López to discuss hospice/Palliative Care.  Called [facility] and left message to return call to RNCC.      Next Steps: Await outcome of plan at discharge.  Care Management will continue to follow for discharge planning.   Addendum:  Per bedside RN Tamara, she received phone call from [facility] stating-they manage pt catheter and any skilled nursing needs he would have. They called and gave their contact number and fax number for when patient gets discharged, requesting to receive the discharge paperwork. Fax: [phone], Phone: [phone].  Loida Ocampo RN  Loida Ocampo RN, BSN, OCN   Inpatient Care Coordination [address]  Office: [phone]      "

## 2025-01-30 NOTE — PLAN OF CARE
Orientation: AOx4-pt has baseline garbled speech associated with previous CVA. Fatigued and slept most of this shift.   Aggression Stop Light: Green  Activity: A2 T/R. He has baseline L sided weakness. Not OOB this shift.   Diet/BS Checks: Reg with moderately thick liquids, BG ACHS- 110 and 93 this shift.   Tele:  NSR  IV Access/Drains: L and R PIVs SL with int abx.   Pain Management: Denies pain this shift   Abnormal VS/Results: VSS on pt's baseline 2L NC. Lung sounds are coarse with wheezing.   Bowel/Bladder: Cardona in place (had PTA), no BM this shift.   Skin/Wounds: Blanchable redness to bottom  Consults: JEROME,   D/C Disposition: Pending, JEROME note states possible discharge on 1/31     Other Info:   -Contact precautions for VRE maintained   -K+ Replacement protocol-rechecks pending   -Still needs sputum sample   -Sticky note in for Speech to see pt for eval given his baseline dysphagia   -Pills whole in applesauce

## 2025-01-30 NOTE — PROGRESS NOTES
[id number]    Pt Aox4. VSS on 2L NC. Pt denies pain. Denies N/V, numbness and tingling. Regular diet, pt prefers thickened liquids and no straws due to dysphagia. Will put sticky note for speech consult to assess pt. BS Check ACHS. Cardona with good UOP. No bm. LS diminished. Pt has baseline left side weakness in UE. Pt needs legionella and resp sputum culture collected. Discharge pending.

## 2025-01-30 NOTE — PLAN OF CARE
Goal Outcome Evaluation:      Plan of Care Reviewed With: child          Outcome Evaluation: Discharge back to [facility]

## 2025-01-30 NOTE — PROVIDER NOTIFICATION
MD Notification    Notified Person: CM    Notified Person Name: Loida Ocampo    Notification Date/Time: 1332 1/30/25    Notification Interaction: [facility] webpage    Purpose of Notification: [facility] -- they manage pt catheter and any skilled nursing needs he would have. They called and gave their contact number and fax number for when patient gets discharged, requesting to receive the discharge paperwork. Fax: [phone], Phone: [phone]    Orders Received:    Comments:

## 2025-01-31 LAB
BACTERIA UR CULT: ABNORMAL
BACTERIA UR CULT: ABNORMAL
CREAT SERPL-MCNC: 0.94 MG/DL (ref 0.67–1.17)
EGFRCR SERPLBLD CKD-EPI 2021: 81 ML/MIN/1.73M2
POTASSIUM SERPL-SCNC: 4.7 MMOL/L (ref 3.4–5.3)

## 2025-01-31 PROCEDURE — 36415 COLL VENOUS BLD VENIPUNCTURE: CPT | Performed by: INTERNAL MEDICINE

## 2025-01-31 PROCEDURE — 99232 SBSQ HOSP IP/OBS MODERATE 35: CPT | Performed by: INTERNAL MEDICINE

## 2025-01-31 PROCEDURE — 250N000013 HC RX MED GY IP 250 OP 250 PS 637: Performed by: INTERNAL MEDICINE

## 2025-01-31 PROCEDURE — 250N000013 HC RX MED GY IP 250 OP 250 PS 637: Performed by: HOSPITALIST

## 2025-01-31 PROCEDURE — 999N000157 HC STATISTIC RCP TIME EA 10 MIN

## 2025-01-31 PROCEDURE — 84132 ASSAY OF SERUM POTASSIUM: CPT | Performed by: INTERNAL MEDICINE

## 2025-01-31 PROCEDURE — 94640 AIRWAY INHALATION TREATMENT: CPT | Mod: 76

## 2025-01-31 PROCEDURE — 120N000001 HC R&B MED SURG/OB

## 2025-01-31 PROCEDURE — 82565 ASSAY OF CREATININE: CPT | Performed by: HOSPITALIST

## 2025-01-31 PROCEDURE — 250N000011 HC RX IP 250 OP 636: Performed by: HOSPITALIST

## 2025-01-31 PROCEDURE — 94640 AIRWAY INHALATION TREATMENT: CPT

## 2025-01-31 PROCEDURE — 250N000009 HC RX 250: Performed by: HOSPITALIST

## 2025-01-31 RX ORDER — QUETIAPINE FUMARATE 25 MG/1
25 TABLET, FILM COATED ORAL AT BEDTIME
Status: DISCONTINUED | OUTPATIENT
Start: 2025-01-31 | End: 2025-02-03 | Stop reason: HOSPADM

## 2025-01-31 RX ADMIN — CARBOXYMETHYLCELLULOSE SODIUM 2 DROP: 5 SOLUTION/ DROPS OPHTHALMIC at 08:53

## 2025-01-31 RX ADMIN — METOPROLOL TARTRATE 12.5 MG: 25 TABLET, FILM COATED ORAL at 21:19

## 2025-01-31 RX ADMIN — CARBOXYMETHYLCELLULOSE SODIUM 2 DROP: 5 SOLUTION/ DROPS OPHTHALMIC at 21:20

## 2025-01-31 RX ADMIN — SENNOSIDES AND DOCUSATE SODIUM 2 TABLET: 50; 8.6 TABLET ORAL at 08:52

## 2025-01-31 RX ADMIN — ENOXAPARIN SODIUM 40 MG: 40 INJECTION SUBCUTANEOUS at 18:06

## 2025-01-31 RX ADMIN — METOPROLOL TARTRATE 12.5 MG: 25 TABLET, FILM COATED ORAL at 08:52

## 2025-01-31 RX ADMIN — AZITHROMYCIN DIHYDRATE 250 MG: 250 TABLET ORAL at 08:52

## 2025-01-31 RX ADMIN — QUETIAPINE FUMARATE 25 MG: 25 TABLET ORAL at 21:20

## 2025-01-31 RX ADMIN — SERTRALINE HYDROCHLORIDE 75 MG: 50 TABLET ORAL at 08:52

## 2025-01-31 RX ADMIN — PIPERACILLIN AND TAZOBACTAM 4.5 G: 4; .5 INJECTION, POWDER, FOR SOLUTION INTRAVENOUS at 18:07

## 2025-01-31 RX ADMIN — UMECLIDINIUM BROMIDE AND VILANTEROL TRIFENATATE 1 PUFF: 62.5; 25 POWDER RESPIRATORY (INHALATION) at 09:20

## 2025-01-31 RX ADMIN — Medication 25 MCG: at 08:52

## 2025-01-31 RX ADMIN — ASPIRIN 81 MG: 81 TABLET, COATED ORAL at 08:52

## 2025-01-31 RX ADMIN — BISACODYL 10 MG: 10 SUPPOSITORY RECTAL at 08:53

## 2025-01-31 RX ADMIN — ALLOPURINOL 300 MG: 300 TABLET ORAL at 08:52

## 2025-01-31 RX ADMIN — PIPERACILLIN AND TAZOBACTAM 4.5 G: 4; .5 INJECTION, POWDER, FOR SOLUTION INTRAVENOUS at 13:01

## 2025-01-31 RX ADMIN — FORMOTEROL FUMARATE DIHYDRATE 20 MCG: 20 SOLUTION RESPIRATORY (INHALATION) at 19:17

## 2025-01-31 RX ADMIN — PIPERACILLIN AND TAZOBACTAM 4.5 G: 4; .5 INJECTION, POWDER, FOR SOLUTION INTRAVENOUS at 07:41

## 2025-01-31 RX ADMIN — PIPERACILLIN AND TAZOBACTAM 4.5 G: 4; .5 INJECTION, POWDER, FOR SOLUTION INTRAVENOUS at 01:05

## 2025-01-31 RX ADMIN — PANTOPRAZOLE SODIUM 40 MG: 40 TABLET, DELAYED RELEASE ORAL at 08:52

## 2025-01-31 RX ADMIN — ATORVASTATIN CALCIUM 10 MG: 10 TABLET, FILM COATED ORAL at 08:52

## 2025-01-31 RX ADMIN — FORMOTEROL FUMARATE DIHYDRATE 20 MCG: 20 SOLUTION RESPIRATORY (INHALATION) at 09:40

## 2025-01-31 RX ADMIN — PANTOPRAZOLE SODIUM 40 MG: 40 TABLET, DELAYED RELEASE ORAL at 21:20

## 2025-01-31 ASSESSMENT — ACTIVITIES OF DAILY LIVING (ADL)
ADLS_ACUITY_SCORE: 79
ADLS_ACUITY_SCORE: 83
ADLS_ACUITY_SCORE: 79
ADLS_ACUITY_SCORE: 79
ADLS_ACUITY_SCORE: 83
ADLS_ACUITY_SCORE: 79
ADLS_ACUITY_SCORE: 83
ADLS_ACUITY_SCORE: 79
ADLS_ACUITY_SCORE: 79
ADLS_ACUITY_SCORE: 83
ADLS_ACUITY_SCORE: 79
ADLS_ACUITY_SCORE: 83
ADLS_ACUITY_SCORE: 79
ADLS_ACUITY_SCORE: 83
ADLS_ACUITY_SCORE: 79

## 2025-01-31 NOTE — PROGRESS NOTES
[facility]  Hospitalist Progress Note  Steve Rendon MD  01/31/2025    Assessment & Plan   Ron Gilmore is a 84 year old male with medical history significant for CVA with left-sided weakness, dysphagia, chronic indwelling Cardona catheter with UTI and sepsis, DM2, BRAD, COPD and chronic hypoxic respiratory failure, depression and anxiety, BPH, GERD, gout was sent to the ER for evaluation of fever and worsening hypoxia.  Found to have pneumonia and admitted on 1/29/2025.       Pneumonia, suspect aspiration with sepsis  Acute on chronic hypoxic respiratory failure  Suspect CAUTI  Reported fever 101 per patient, dyspnea and fatigue.  Leukocytosis 16.7, mildly tachycardic.  CXR with prominent LLL opacity but also has diffuse interstitial opacities bilaterally, concerning pulmonary edema but clinically appears dehydrated, proBNP normal and no overt crackles.  Hypoxic needing 10 LPM, now down to 6 in ER.  Baseline 2 LPM O2 need.  -Admit to inpatient  -Blood cultures and urine culture sent, UA grew Pseudomonas and Enterococcus in the past  -IV Zosyn and azithromycin  -Supplemental O2, wean as able  -Received 1L NS, although CXR shows interstitial opacity concerning for fluid overload, appears dry clinically, tolerated bolus fluid in ER,  cc/hour x 5 hours and monitor respiratory status  - CBC back to [place], no fever, oxygen back to baseline.  - Ucx shows enterococcus fecalis which along with pseudomonas likely colonizer  - will add seroquel for sleep        COPD with chronic hypoxic respiratory failure  BRAD  No excessive wheezing.  Uses supplemental O2 2 LPM at baseline.  Increased hypoxia due to pneumonia as above.  No concern for COPD exacerbation.  -Continue PTA inhalers last nebs  - added mucinex     BPH  Chronic indwelling Cardona catheter  Per ER physician, indwelling catheter was replaced in ER     History of CVA, residual left-sided  anna-plegia  Hypertension  Hyperlipidemia  Continue PTA aspirin, Lipitor, metoprolol  -Hold PTA Lasix for now.  -Patient reports he is on regular diet.  Monitor for signs of aspiration, SLP eval if noted     Depression and anxiety  -Continue PTA sertraline  -add seroquel at bedtime for sleep     DM2  Noted HbA1c 6.4 last month, prediabetes.  Not on medication.  Blood sugar 168 in ER  - stop sliding scale insulin, BG ok     GERD  -Continue PTA PPI     Gout  -Continue PTA allopurinol     Chronic anemia  -Hemoglobin has fluctuated, 9-10 is likely his baseline.  Presents with 11.3.  Likely hemoconcentrated, anticipate some drop with IV fluid and given his acute illness, monitor        Diet:  Regular   DVT Prophylaxis: Enoxaparin (Lovenox) SQ  Cardona Catheter: PRESENT, indication:    Lines: None     Cardiac Monitoring: None  Code Status:  DNR/DNI, discussed with patient, POLST reviewed.        Clinically Significant Risk Factors Present on Admission               # Hypoalbuminemia: Lowest albumin = 3.4 g/dL at 1/29/2025  9:50 AM, will monitor as appropriate               # Overweight: Estimated body mass index is 27.12 kg/m  as calculated from the following:    Height as of this encounter: 1.829 m (6').    Weight as of this encounter: 90.7 kg (200 lb).       # Financial/Environmental Concerns:          Disposition Plan  -- back to Assisted living, he uses a wheel chair     Medically Ready for Discharge:  -- anticipate in 1-2 days    Disposition:     Interval History   -- poor sleep  -- oxygen back to baseline  -- still not feeling better, feels very weak, poor sleep    -Data reviewed today: I reviewed all new labs and imaging over the last 24 hours. I personally reviewed no images or EKG's today.    Physical Exam    , Blood pressure 130/67, pulse 78, temperature 97.7  F (36.5  C), temperature source Oral, resp. rate 17, height 1.829 m (6'), weight 91.3 kg (201 lb 4.5 oz), SpO2 96%.  Vitals:    01/29/25 0940 01/30/25 0612  01/31/25 0627   Weight: 90.7 kg (200 lb) 91.2 kg (201 lb 1 oz) 91.3 kg (201 lb 4.5 oz)     Vital Signs with Ranges  Temp:  [97.7  F (36.5  C)-98.2  F (36.8  C)] 97.7  F (36.5  C)  Pulse:  [76-96] 78  Resp:  [17-18] 17  BP: (105-130)/(55-67) 130/67  SpO2:  [92 %-96 %] 96 %  I/O's Last 24 hours  I/O last 3 completed shifts:  In: 120 [P.O.:120]  Out: 850 [Urine:850]    Constitutional: Awake, alert, cooperative, no apparent distress  Respiratory: Coarse anteriorly  Cardiovascular: Regular rate and rhythm, normal S1 and S2, and no murmur noted  GI: Normal bowel sounds, soft, non-distended, non-tender  Skin/Integumen: No rashes, no cyanosis   Other:      Medications   All medications were reviewed.  Current Facility-Administered Medications   Medication Dose Route Frequency Provider Last Rate Last Admin     Current Facility-Administered Medications   Medication Dose Route Frequency Provider Last Rate Last Admin    allopurinol (ZYLOPRIM) tablet 300 mg  300 mg Oral Parish Berry MD   300 mg at 01/31/25 0852    aspirin EC tablet 81 mg  81 mg Oral Daily Parish Rawls MD   81 mg at 01/31/25 0852    atorvastatin (LIPITOR) tablet 10 mg  10 mg Oral Parish Berry MD   10 mg at 01/31/25 0852    azithromycin (ZITHROMAX) tablet 250 mg  250 mg Oral Daily Parish Rawls MD   250 mg at 01/31/25 0852    bisacodyl (DULCOLAX) suppository 10 mg  10 mg Rectal Once per day on Monday Wednesday Friday Parish Rawls MD   10 mg at 01/31/25 0853    carboxymethylcellulose PF (REFRESH PLUS) 0.5 % ophthalmic solution 2 drop  2 drop Both Eyes BID Parish Rawls MD   2 drop at 01/31/25 0853    enoxaparin ANTICOAGULANT (LOVENOX) injection 40 mg  40 mg Subcutaneous Q24H Parish Rawls MD   40 mg at 01/30/25 1730    formoterol (PERFOROMIST) neb solution 20 mcg  20 mcg Nebulization Q12H Parish Rawls MD   20 mcg at 01/31/25 0940    [Held by provider] furosemide (LASIX) tablet 20 mg  20 mg Oral Daily Parish Rawls MD         metoprolol tartrate (LOPRESSOR) half-tab 12.5 mg  12.5 mg Oral BID Parish Rawls MD   12.5 mg at 01/31/25 0852    pantoprazole (PROTONIX) EC tablet 40 mg  40 mg Oral BID Parish Rawls MD   40 mg at 01/31/25 0852    piperacillin-tazobactam (ZOSYN) 4.5 g vial to attach to  mL bag  4.5 g Intravenous Q6H Parish Rawls MD   4.5 g at 01/31/25 0741    senna-docusate (SENOKOT-S/PERICOLACE) 8.6-50 MG per tablet 2 tablet  2 tablet Oral BID Parish Rawls MD   2 tablet at 01/31/25 0852    sertraline (ZOLOFT) tablet 75 mg  75 mg Oral Daily Parish Rawls MD   75 mg at 01/31/25 0852    sodium chloride (PF) 0.9% PF flush 3 mL  3 mL Intracatheter Q8H Parihs Rawls MD   3 mL at 01/31/25 1129    umeclidinium-vilanterol (ANORO ELLIPTA) 62.5-25 MCG/ACT oral inhaler 1 puff  1 puff Inhalation Daily Parish Rawls MD   1 puff at 01/31/25 0920    Vitamin D3 (CHOLECALCIFEROL) tablet 25 mcg  25 mcg Oral Daily Parish Rawls MD   25 mcg at 01/31/25 0852        Data   Recent Labs   Lab 01/31/25  0830 01/30/25  1222 01/30/25  0855 01/30/25  0853 01/29/25  1713 01/29/25  0950   WBC  --   --   --  10.0  --  16.7*   HGB  --   --   --  10.0*  --  11.3*   MCV  --   --   --  86  --  83   PLT  --   --   --  170  --  199   NA  --   --   --  137  --  136   POTASSIUM 4.7  --   --  5.1  --  4.7   CHLORIDE  --   --   --  102  --  99   CO2  --   --   --  30*  --  29   BUN  --   --   --  17.1  --  18.8   CR  --   --   --  0.85  --  0.86   ANIONGAP  --   --   --  5*  --  8   RAJ  --   --   --  8.6*  --  9.0   GLC  --  143* 105* 116*   < > 178*   ALBUMIN  --   --   --  3.0*  --  3.4*   PROTTOTAL  --   --   --  6.8  --  7.8   BILITOTAL  --   --   --  0.5  --  0.3   ALKPHOS  --   --   --  72  --  92   ALT  --   --   --  9  --  12   AST  --   --   --  13  --  21    < > = values in this interval not displayed.       No results found for this or any previous visit (from the past 24 hours).    Steve Rendon MD  Text Page  (7am  to 6pm)

## 2025-01-31 NOTE — PLAN OF CARE
Orientation: A&Ox4  Aggression Stop Light: Green   Activity: A2 lift, T&R  Diet/BS Checks: Regular with mod thick liquids   Tele:  n/a  IV Access/Drains: L/R PIV CDI SL  Pain Management: Denied  Abnormal VS/Results: VSS on 2L NC. K recheck in AM  Bowel/Bladder: Cardona CDI and patent, no BM this shift   Skin/Wounds: Intact, scattered bruising and lainey redness to sacrum/coccyx  Consults: Care Coordinator   D/C Disposition: Pending   Other Info: Pt takes pills whole in pudding

## 2025-01-31 NOTE — PLAN OF CARE
8536 - 1806    Orientation: A&Ox4, garbled speech  Aggression Stop Light: Green  Activity: A2 w/lift, T/R q 2 hrs  Diet/BS Checks: Regular, mildly-thickened liquids  Tele: N/A  IV Access/Drains: L PIV SL  Pain Management: Denies pain  Abnormal VS/Results: VSS on 2L NC  Bowel/Bladder: Cardona in place, no BM this shift  Skin/Wounds: Blanchable redness to coccyx, scattered bruising, +1 BLE edema  Consults: SW  D/C Disposition: Pending improvement  Other Info:   - Takes meds whole one at a time with pudding

## 2025-02-01 LAB
PLATELET # BLD AUTO: 211 10E3/UL (ref 150–450)
POTASSIUM SERPL-SCNC: 4.3 MMOL/L (ref 3.4–5.3)

## 2025-02-01 PROCEDURE — 999N000157 HC STATISTIC RCP TIME EA 10 MIN

## 2025-02-01 PROCEDURE — 250N000013 HC RX MED GY IP 250 OP 250 PS 637: Performed by: HOSPITALIST

## 2025-02-01 PROCEDURE — 36415 COLL VENOUS BLD VENIPUNCTURE: CPT | Performed by: HOSPITALIST

## 2025-02-01 PROCEDURE — 99232 SBSQ HOSP IP/OBS MODERATE 35: CPT | Performed by: INTERNAL MEDICINE

## 2025-02-01 PROCEDURE — 120N000001 HC R&B MED SURG/OB

## 2025-02-01 PROCEDURE — 250N000013 HC RX MED GY IP 250 OP 250 PS 637: Performed by: INTERNAL MEDICINE

## 2025-02-01 PROCEDURE — 94640 AIRWAY INHALATION TREATMENT: CPT

## 2025-02-01 PROCEDURE — 250N000011 HC RX IP 250 OP 636: Performed by: HOSPITALIST

## 2025-02-01 PROCEDURE — 85049 AUTOMATED PLATELET COUNT: CPT | Performed by: HOSPITALIST

## 2025-02-01 PROCEDURE — 250N000009 HC RX 250: Performed by: HOSPITALIST

## 2025-02-01 PROCEDURE — 84132 ASSAY OF SERUM POTASSIUM: CPT | Performed by: INTERNAL MEDICINE

## 2025-02-01 PROCEDURE — 94640 AIRWAY INHALATION TREATMENT: CPT | Mod: 76

## 2025-02-01 RX ADMIN — PIPERACILLIN AND TAZOBACTAM 4.5 G: 4; .5 INJECTION, POWDER, FOR SOLUTION INTRAVENOUS at 12:10

## 2025-02-01 RX ADMIN — CARBOXYMETHYLCELLULOSE SODIUM 2 DROP: 5 SOLUTION/ DROPS OPHTHALMIC at 22:00

## 2025-02-01 RX ADMIN — FORMOTEROL FUMARATE DIHYDRATE 20 MCG: 20 SOLUTION RESPIRATORY (INHALATION) at 19:25

## 2025-02-01 RX ADMIN — FORMOTEROL FUMARATE DIHYDRATE 20 MCG: 20 SOLUTION RESPIRATORY (INHALATION) at 07:21

## 2025-02-01 RX ADMIN — ATORVASTATIN CALCIUM 10 MG: 10 TABLET, FILM COATED ORAL at 08:27

## 2025-02-01 RX ADMIN — PIPERACILLIN AND TAZOBACTAM 4.5 G: 4; .5 INJECTION, POWDER, FOR SOLUTION INTRAVENOUS at 18:12

## 2025-02-01 RX ADMIN — PANTOPRAZOLE SODIUM 40 MG: 40 TABLET, DELAYED RELEASE ORAL at 08:31

## 2025-02-01 RX ADMIN — ASPIRIN 81 MG: 81 TABLET, COATED ORAL at 08:27

## 2025-02-01 RX ADMIN — UMECLIDINIUM BROMIDE AND VILANTEROL TRIFENATATE 1 PUFF: 62.5; 25 POWDER RESPIRATORY (INHALATION) at 08:42

## 2025-02-01 RX ADMIN — Medication 25 MCG: at 08:28

## 2025-02-01 RX ADMIN — SENNOSIDES AND DOCUSATE SODIUM 2 TABLET: 50; 8.6 TABLET ORAL at 22:00

## 2025-02-01 RX ADMIN — METOPROLOL TARTRATE 12.5 MG: 25 TABLET, FILM COATED ORAL at 22:01

## 2025-02-01 RX ADMIN — PIPERACILLIN AND TAZOBACTAM 4.5 G: 4; .5 INJECTION, POWDER, FOR SOLUTION INTRAVENOUS at 01:51

## 2025-02-01 RX ADMIN — CARBOXYMETHYLCELLULOSE SODIUM 2 DROP: 5 SOLUTION/ DROPS OPHTHALMIC at 08:36

## 2025-02-01 RX ADMIN — PANTOPRAZOLE SODIUM 40 MG: 40 TABLET, DELAYED RELEASE ORAL at 22:01

## 2025-02-01 RX ADMIN — AZITHROMYCIN DIHYDRATE 250 MG: 250 TABLET ORAL at 08:28

## 2025-02-01 RX ADMIN — SERTRALINE HYDROCHLORIDE 75 MG: 50 TABLET ORAL at 08:27

## 2025-02-01 RX ADMIN — PIPERACILLIN AND TAZOBACTAM 4.5 G: 4; .5 INJECTION, POWDER, FOR SOLUTION INTRAVENOUS at 06:35

## 2025-02-01 RX ADMIN — QUETIAPINE FUMARATE 25 MG: 25 TABLET ORAL at 22:00

## 2025-02-01 RX ADMIN — ENOXAPARIN SODIUM 40 MG: 40 INJECTION SUBCUTANEOUS at 16:06

## 2025-02-01 RX ADMIN — METOPROLOL TARTRATE 12.5 MG: 25 TABLET, FILM COATED ORAL at 08:27

## 2025-02-01 RX ADMIN — SENNOSIDES AND DOCUSATE SODIUM 2 TABLET: 50; 8.6 TABLET ORAL at 08:28

## 2025-02-01 RX ADMIN — ALLOPURINOL 300 MG: 300 TABLET ORAL at 08:28

## 2025-02-01 ASSESSMENT — ACTIVITIES OF DAILY LIVING (ADL)
ADLS_ACUITY_SCORE: 83

## 2025-02-01 NOTE — PLAN OF CARE
Goal Outcome Evaluation:      Plan of Care Reviewed With: patient    Overall Patient Progress: no changeOverall Patient Progress: no change     2300 - 0730    Orientation: A&Ox4, garbled speech, Pleasant  Aggression Stop Light: Green  Activity: Ax2 w/lift, T/R q2 hrs  Diet/BS Checks: Regular diet, mildly-thickened liquids. Takes meds whole one at a time in pudding  Tele: N/A  IV Access/Drains: R & L PIV SL with int abx  Pain Management: Denied pain this shift  Abnormal VS/Results: VSS on 2L NC. On K+ replacement, recheck in AM  Bowel/Bladder: Incontinent of bowel, pt had 1 large BM yesterday after scheduled suppository. Cardona in place with adequate UOP  Skin/Wounds: Blanchable redness with excoriation on sacrum/coccyx, scattered bruising and scabs, trace edema to BLE  Consults: JEROME  D/C Disposition: Pending  Other Info:   - Contact precautions maintained

## 2025-02-01 NOTE — PROGRESS NOTES
[facility]  Hospitalist Progress Note  Steve Rendon MD  02/01/2025    Assessment & Plan   Ron Gilmore is a 84 year old male with medical history significant for CVA with left-sided weakness, dysphagia, chronic indwelling Cardona catheter with UTI and sepsis, DM2, BRAD, COPD and chronic hypoxic respiratory failure, depression and anxiety, BPH, GERD, gout was sent to the ER for evaluation of fever and worsening hypoxia.  Found to have pneumonia and admitted on 1/29/2025.       Pneumonia, suspect aspiration with sepsis  Acute on chronic hypoxic respiratory failure  Suspect CAUTI  Reported fever 101 per patient, dyspnea and fatigue.  Leukocytosis 16.7, mildly tachycardic.  CXR with prominent LLL opacity but also has diffuse interstitial opacities bilaterally, concerning pulmonary edema but clinically appears dehydrated, proBNP normal and no overt crackles.  Hypoxic needing 10 LPM, now down to 6 in ER.  Baseline 2 LPM O2 need.  -Admit to inpatient  -Blood cultures and urine culture sent, UA grew Pseudomonas and Enterococcus in the past  -IV Zosyn and azithromycin, to oral po antibiotics tomorrow  -Supplemental O2, wean as able  -Received 1L NS, although CXR shows interstitial opacity concerning for fluid overload, appears dry clinically, tolerated bolus fluid in ER,  cc/hour x 5 hours and monitor respiratory status  - CBC back to [place], no fever, oxygen back to baseline.  - Ucx shows enterococcus fecalis which along with pseudomonas likely colonizer  - was given seroquel for sleep and patient feels much better, slept till 11 AM        COPD with chronic hypoxic respiratory failure  BRAD  No excessive wheezing.  Uses supplemental O2 2 LPM at baseline.  Increased hypoxia due to pneumonia as above.  No concern for COPD exacerbation.  -Continue PTA inhalers last nebs  -continue mucinex     BPH  Chronic indwelling Cardona catheter  Per ER physician, indwelling catheter was replaced in ER      History of CVA, residual left-sided anna-plegia  Hypertension  Hyperlipidemia  Continue PTA aspirin, Lipitor, metoprolol  -Hold PTA Lasix for now.  -Patient reports he is on regular diet.  Monitor for signs of aspiration, SLP eval if noted     Depression and anxiety  -Continue PTA sertraline  -add seroquel at bedtime for sleep     DM2  Noted HbA1c 6.4 last month, prediabetes.  Not on medication.  Blood sugar 168 in ER  - stop sliding scale insulin, BG ok     GERD  -Continue PTA PPI     Gout  -Continue PTA allopurinol     Chronic anemia  -Hemoglobin has fluctuated, 9-10 is likely his baseline.  Presents with 11.3.  Likely hemoconcentrated, anticipate some drop with IV fluid and given his acute illness, monitor        Diet:  Regular   DVT Prophylaxis: Enoxaparin (Lovenox) SQ  Cardona Catheter: PRESENT, indication:    Lines: None     Cardiac Monitoring: None  Code Status:  DNR/DNI, discussed with patient, POLST reviewed.        Clinically Significant Risk Factors Present on Admission               # Hypoalbuminemia: Lowest albumin = 3.4 g/dL at 1/29/2025  9:50 AM, will monitor as appropriate               # Overweight: Estimated body mass index is 27.12 kg/m  as calculated from the following:    Height as of this encounter: 1.829 m (6').    Weight as of this encounter: 90.7 kg (200 lb).       # Financial/Environmental Concerns:          Disposition Plan  -- back to Assisted living, he uses a wheel chair     Medically Ready for Discharge:  -- anticipate back to LTC on 2/2    Disposition:     Interval History   -- slept much better  -- oxygen back to baseline  -- feeling significantly better today    -Data reviewed today: I reviewed all new labs and imaging over the last 24 hours. I personally reviewed no images or EKG's today.    Physical Exam    , Blood pressure 130/64, pulse 77, temperature 98.1  F (36.7  C), temperature source Oral, resp. rate 18, height 1.829 m (6'), weight 98.7 kg (217 lb 9.5 oz), SpO2  93%.  Vitals:    01/30/25 0612 01/31/25 0627 02/01/25 0641   Weight: 91.2 kg (201 lb 1 oz) 91.3 kg (201 lb 4.5 oz) 98.7 kg (217 lb 9.5 oz)     Vital Signs with Ranges  Temp:  [98.1  F (36.7  C)-98.6  F (37  C)] 98.1  F (36.7  C)  Pulse:  [71-81] 77  Resp:  [16-18] 18  BP: (126-132)/(60-67) 130/64  SpO2:  [90 %-93 %] 93 %  I/O's Last 24 hours  I/O last 3 completed shifts:  In: -   Out: 1600 [Urine:1600]    Constitutional: Awake, alert, cooperative, no apparent distress  Respiratory: Coarse anteriorly  Cardiovascular: Regular rate and rhythm, normal S1 and S2, and no murmur noted  GI: Normal bowel sounds, soft, non-distended, non-tender  Skin/Integumen: No rashes, no cyanosis   Other:      Medications   All medications were reviewed.  Current Facility-Administered Medications   Medication Dose Route Frequency Provider Last Rate Last Admin     Current Facility-Administered Medications   Medication Dose Route Frequency Provider Last Rate Last Admin    allopurinol (ZYLOPRIM) tablet 300 mg  300 mg Oral Parish Berry MD   300 mg at 02/01/25 0828    aspirin EC tablet 81 mg  81 mg Oral Daily Parish Rawls MD   81 mg at 02/01/25 0827    atorvastatin (LIPITOR) tablet 10 mg  10 mg Oral Parish Berry MD   10 mg at 02/01/25 0827    azithromycin (ZITHROMAX) tablet 250 mg  250 mg Oral Daily Parish Rawls MD   250 mg at 02/01/25 0828    bisacodyl (DULCOLAX) suppository 10 mg  10 mg Rectal Once per day on Monday Wednesday Friday Parish Rawls MD   10 mg at 01/31/25 0853    carboxymethylcellulose PF (REFRESH PLUS) 0.5 % ophthalmic solution 2 drop  2 drop Both Eyes BID Parish Rawls MD   2 drop at 02/01/25 0836    enoxaparin ANTICOAGULANT (LOVENOX) injection 40 mg  40 mg Subcutaneous Q24H Parish Rawls MD   40 mg at 01/31/25 1806    formoterol (PERFOROMIST) neb solution 20 mcg  20 mcg Nebulization Q12H Parish Rawls MD   20 mcg at 02/01/25 0721    [Held by provider] furosemide (LASIX) tablet 20  mg  20 mg Oral Daily Parish Rawls MD        metoprolol tartrate (LOPRESSOR) half-tab 12.5 mg  12.5 mg Oral BID Parish Rawls MD   12.5 mg at 02/01/25 0827    pantoprazole (PROTONIX) EC tablet 40 mg  40 mg Oral BID Parish Rawls MD   40 mg at 02/01/25 0831    piperacillin-tazobactam (ZOSYN) 4.5 g vial to attach to  mL bag  4.5 g Intravenous Q6H Parish Rawls MD   4.5 g at 02/01/25 1210    QUEtiapine (SEROquel) tablet 25 mg  25 mg Oral At Bedtime Steve Rendon MD   25 mg at 01/31/25 2120    senna-docusate (SENOKOT-S/PERICOLACE) 8.6-50 MG per tablet 2 tablet  2 tablet Oral BID Parish Rawls MD   2 tablet at 02/01/25 0828    sertraline (ZOLOFT) tablet 75 mg  75 mg Oral Daily Parish Rawls MD   75 mg at 02/01/25 0827    sodium chloride (PF) 0.9% PF flush 3 mL  3 mL Intracatheter Q8H Parish Rawls MD   3 mL at 02/01/25 1044    umeclidinium-vilanterol (ANORO ELLIPTA) 62.5-25 MCG/ACT oral inhaler 1 puff  1 puff Inhalation Daily Parish Rawls MD   1 puff at 02/01/25 0842    Vitamin D3 (CHOLECALCIFEROL) tablet 25 mcg  25 mcg Oral Daily Parish Rawls MD   25 mcg at 02/01/25 0828        Data   Recent Labs   Lab 02/01/25  1410 01/31/25  0830 01/30/25  1222 01/30/25  0855 01/30/25  0853 01/29/25  1713 01/29/25  0950   WBC  --   --   --   --  10.0  --  16.7*   HGB  --   --   --   --  10.0*  --  11.3*   MCV  --   --   --   --  86  --  83     --   --   --  170  --  199   NA  --   --   --   --  137  --  136   POTASSIUM 4.3 4.7  --   --  5.1  --  4.7   CHLORIDE  --   --   --   --  102  --  99   CO2  --   --   --   --  30*  --  29   BUN  --   --   --   --  17.1  --  18.8   CR  --  0.94  --   --  0.85  --  0.86   ANIONGAP  --   --   --   --  5*  --  8   RAJ  --   --   --   --  8.6*  --  9.0   GLC  --   --  143* 105* 116*   < > 178*   ALBUMIN  --   --   --   --  3.0*  --  3.4*   PROTTOTAL  --   --   --   --  6.8  --  7.8   BILITOTAL  --   --   --   --  0.5  --  0.3   ALKPHOS  --    --   --   --  72  --  92   ALT  --   --   --   --  9  --  12   AST  --   --   --   --  13  --  21    < > = values in this interval not displayed.       No results found for this or any previous visit (from the past 24 hours).    Steve Rendon MD  Text Page  (7am to 6pm)

## 2025-02-01 NOTE — PLAN OF CARE
Orientation: A&Ox4, garbled speech  Aggression Stop Light: Green  Activity: Ax2 w/lift, T/R q2 hrs  Diet/BS Checks: Regular diet, mildly-thickened liquids. Takes meds whole one at a time in pudding  Tele: N/A  IV Access/Drains: R & L PIV SL with int abx  Pain Management: Denied pain this shift  Abnormal VS/Results: VSS on 2L NC. On K+ replacement, recheck in AM  Bowel/Bladder: Incontinent of bowel, pt had 1 large BM this shift after scheduled suppository. Cardona in place with adequate UOP  Skin/Wounds: Blanchable redness with excoriation on sacrum/coccyx, scattered bruising and scabs, trace edema to BLE  Consults: JEROME  D/C Disposition: Pending  Other Info:   - Contact precautions maintained

## 2025-02-01 NOTE — PLAN OF CARE
Goal Outcome Evaluation:         Reason for Admission: pneumonia    Cognitive/Mentation: A/Ox 4, forgetful  VS: VSS on 2L NC.   Tele: n/a.  /GI: incontinent. Last BM 01/31/25.   Pulmonary: frequent, nonproductive cough.  Pain: denies.     Drains/Lines: PIV SL  Skin: blanchable redness with peeling on sacrum, scattered bruising/scabs, BLE trace edema  Activity: Assist x 2 with lift.  Diet: regular with midly thickened liquids. Takes pills whole one at a time in pudding.     Therapies recs: pending  Discharge: pending    Aggression Stoplight Tool: green    End of shift summary: plan to discharge back to facility when medically ready

## 2025-02-02 LAB — POTASSIUM SERPL-SCNC: 4.3 MMOL/L (ref 3.4–5.3)

## 2025-02-02 PROCEDURE — 36415 COLL VENOUS BLD VENIPUNCTURE: CPT | Performed by: INTERNAL MEDICINE

## 2025-02-02 PROCEDURE — 99232 SBSQ HOSP IP/OBS MODERATE 35: CPT | Performed by: INTERNAL MEDICINE

## 2025-02-02 PROCEDURE — 250N000011 HC RX IP 250 OP 636: Performed by: HOSPITALIST

## 2025-02-02 PROCEDURE — 250N000013 HC RX MED GY IP 250 OP 250 PS 637: Performed by: INTERNAL MEDICINE

## 2025-02-02 PROCEDURE — 120N000001 HC R&B MED SURG/OB

## 2025-02-02 PROCEDURE — 84132 ASSAY OF SERUM POTASSIUM: CPT | Performed by: INTERNAL MEDICINE

## 2025-02-02 PROCEDURE — 999N000157 HC STATISTIC RCP TIME EA 10 MIN

## 2025-02-02 PROCEDURE — 94640 AIRWAY INHALATION TREATMENT: CPT

## 2025-02-02 PROCEDURE — 250N000013 HC RX MED GY IP 250 OP 250 PS 637: Performed by: HOSPITALIST

## 2025-02-02 PROCEDURE — 250N000009 HC RX 250: Performed by: HOSPITALIST

## 2025-02-02 PROCEDURE — 94640 AIRWAY INHALATION TREATMENT: CPT | Mod: 76

## 2025-02-02 RX ORDER — AMPICILLIN 500 MG/1
500 CAPSULE ORAL EVERY 6 HOURS SCHEDULED
Status: DISCONTINUED | OUTPATIENT
Start: 2025-02-02 | End: 2025-02-03 | Stop reason: HOSPADM

## 2025-02-02 RX ADMIN — CARBOXYMETHYLCELLULOSE SODIUM 2 DROP: 5 SOLUTION/ DROPS OPHTHALMIC at 20:15

## 2025-02-02 RX ADMIN — AZITHROMYCIN DIHYDRATE 250 MG: 250 TABLET ORAL at 08:24

## 2025-02-02 RX ADMIN — PIPERACILLIN AND TAZOBACTAM 4.5 G: 4; .5 INJECTION, POWDER, FOR SOLUTION INTRAVENOUS at 06:31

## 2025-02-02 RX ADMIN — PIPERACILLIN AND TAZOBACTAM 4.5 G: 4; .5 INJECTION, POWDER, FOR SOLUTION INTRAVENOUS at 01:11

## 2025-02-02 RX ADMIN — AMPICILLIN 500 MG: 500 CAPSULE ORAL at 17:09

## 2025-02-02 RX ADMIN — METOPROLOL TARTRATE 12.5 MG: 25 TABLET, FILM COATED ORAL at 08:23

## 2025-02-02 RX ADMIN — ASPIRIN 81 MG: 81 TABLET, COATED ORAL at 08:23

## 2025-02-02 RX ADMIN — QUETIAPINE FUMARATE 25 MG: 25 TABLET ORAL at 22:29

## 2025-02-02 RX ADMIN — ENOXAPARIN SODIUM 40 MG: 40 INJECTION SUBCUTANEOUS at 17:09

## 2025-02-02 RX ADMIN — ATORVASTATIN CALCIUM 10 MG: 10 TABLET, FILM COATED ORAL at 08:23

## 2025-02-02 RX ADMIN — SENNOSIDES AND DOCUSATE SODIUM 2 TABLET: 50; 8.6 TABLET ORAL at 08:22

## 2025-02-02 RX ADMIN — PANTOPRAZOLE SODIUM 40 MG: 40 TABLET, DELAYED RELEASE ORAL at 20:15

## 2025-02-02 RX ADMIN — PIPERACILLIN AND TAZOBACTAM 4.5 G: 4; .5 INJECTION, POWDER, FOR SOLUTION INTRAVENOUS at 13:11

## 2025-02-02 RX ADMIN — METOPROLOL TARTRATE 12.5 MG: 25 TABLET, FILM COATED ORAL at 20:15

## 2025-02-02 RX ADMIN — SERTRALINE HYDROCHLORIDE 75 MG: 50 TABLET ORAL at 08:23

## 2025-02-02 RX ADMIN — CARBOXYMETHYLCELLULOSE SODIUM 2 DROP: 5 SOLUTION/ DROPS OPHTHALMIC at 08:46

## 2025-02-02 RX ADMIN — SENNOSIDES AND DOCUSATE SODIUM 2 TABLET: 50; 8.6 TABLET ORAL at 20:14

## 2025-02-02 RX ADMIN — Medication 25 MCG: at 08:23

## 2025-02-02 RX ADMIN — UMECLIDINIUM BROMIDE AND VILANTEROL TRIFENATATE 1 PUFF: 62.5; 25 POWDER RESPIRATORY (INHALATION) at 08:24

## 2025-02-02 RX ADMIN — FORMOTEROL FUMARATE DIHYDRATE 20 MCG: 20 SOLUTION RESPIRATORY (INHALATION) at 07:18

## 2025-02-02 RX ADMIN — PANTOPRAZOLE SODIUM 40 MG: 40 TABLET, DELAYED RELEASE ORAL at 08:24

## 2025-02-02 RX ADMIN — ALLOPURINOL 300 MG: 300 TABLET ORAL at 08:24

## 2025-02-02 RX ADMIN — FORMOTEROL FUMARATE DIHYDRATE 20 MCG: 20 SOLUTION RESPIRATORY (INHALATION) at 18:36

## 2025-02-02 ASSESSMENT — ACTIVITIES OF DAILY LIVING (ADL)
ADLS_ACUITY_SCORE: 82
ADLS_ACUITY_SCORE: 83
ADLS_ACUITY_SCORE: 82
ADLS_ACUITY_SCORE: 80
ADLS_ACUITY_SCORE: 82
ADLS_ACUITY_SCORE: 82
ADLS_ACUITY_SCORE: 80
ADLS_ACUITY_SCORE: 82
ADLS_ACUITY_SCORE: 82

## 2025-02-02 NOTE — PLAN OF CARE
Orientation: A&Ox4, garbled speech  Aggression Stop Light: Green  Activity: Ax2 w/lift, T/R q2 hrs  Diet/BS Checks: Regular diet, mildly-thickened liquids.   Tele: N/A  IV Access/Drains: R & L PIV SL with int abx  Pain Management: Denied pain this shift  Abnormal VS/Results: VSS on 2L via NC. On K+ replacement, recheck in AM  Bowel/Bladder: Incontinent of bowel, No BM this shift. Cardona in place with adequate UOP  Skin/Wounds: Blanchable redness with excoriation on sacrum/coccyx- mepi in place, scattered bruising and scabs, trace edema to BLE  Consults: JEROME  D/C Disposition: Possible discharge tomorrow 2/2 to LTC  Other Info:   - Takes meds whole in applesauce/pudding

## 2025-02-02 NOTE — PROGRESS NOTES
[facility]  Hospitalist Progress Note  Steve Rendon MD  02/02/2025    Assessment & Plan   Ron Gilmore is a 84 year old male with medical history significant for CVA with left-sided weakness, dysphagia, chronic indwelling Cardona catheter with UTI and sepsis, DM2, BRAD, COPD and chronic hypoxic respiratory failure, depression and anxiety, BPH, GERD, gout was sent to the ER for evaluation of fever and worsening hypoxia.  Found to have pneumonia and admitted on 1/29/2025.       Pneumonia, suspect aspiration with sepsis  Acute on chronic hypoxic respiratory failure  Acute diastolic congestive heart failure  Reported fever 101 per patient, dyspnea and fatigue.  Leukocytosis 16.7, mildly tachycardic.  CXR with prominent LLL opacity but also has diffuse interstitial opacities bilaterally, concerning pulmonary edema but clinically appears dehydrated, proBNP normal and no overt crackles.  Hypoxic needing 10 LPM, now down to 6 in ER.  Baseline 2 LPM O2 need.  -Admit to inpatient  -Blood cultures and urine culture sent, UA grew Pseudomonas and Enterococcus in the past  -initially treated with Zosyn and azithromycin   -Supplemental O2, wean as able  -Received 1L NS, although CXR shows interstitial opacity concerning for fluid overload, appears dry clinically, tolerated bolus fluid in ER,  cc/hour x 5 hours and monitor respiratory status  - CBC back to [place], no fever, oxygen back to baseline.  - was given seroquel for sleep and patient feels much better         COPD with chronic hypoxic respiratory failure  BRAD  No excessive wheezing.  Uses supplemental O2 2 LPM at baseline.  Increased hypoxia due to pneumonia as above.  No concern for COPD exacerbation.  -Continue PTA inhalers last nebs  -continue mucinex    Suspect CAUTI  BPH  Chronic indwelling Cardona catheter  Per ER physician, indwelling catheter was replaced in ER  - Ucx shows enterococcus fecalis    - change from Zosyn to ampicillin  to complete 7 day course (until 2/4/25)     History of CVA, residual left-sided anna-plegia  Hypertension  Hyperlipidemia  Continue PTA aspirin, Lipitor, metoprolol  -continue Lasix  -Patient reports he is on regular diet.  Monitor for signs of aspiration, SLP eval if noted     Depression and anxiety  -Continue PTA sertraline  -continue seroquel at bedtime for sleep     DM2  Noted HbA1c 6.4 last month, prediabetes.  Not on medication.  Blood sugar 168 in ER  - stop sliding scale insulin, BG ok     GERD  -Continue PTA PPI     Gout  -Continue PTA allopurinol     Chronic anemia  -Hemoglobin has fluctuated, 9-10 is likely his baseline.  Presents with 11.3.  Likely hemoconcentrated, anticipate some drop with IV fluid and given his acute illness, monitor        Diet:  Regular   DVT Prophylaxis: Enoxaparin (Lovenox) SQ  Cardona Catheter: PRESENT, indication:    Lines: None     Cardiac Monitoring: None  Code Status:  DNR/DNI, discussed with patient, POLST reviewed.        Clinically Significant Risk Factors Present on Admission               # Hypoalbuminemia: Lowest albumin = 3.4 g/dL at 1/29/2025  9:50 AM, will monitor as appropriate          # Overweight: Estimated body mass index is 27.12 kg/m  as calculated from the following:    Height as of this encounter: 1.829 m (6').    Weight as of this encounter: 90.7 kg (200 lb).       # Financial/Environmental Concerns:          Disposition Plan  -- back to Assisted living, he uses a wheel chair     Medically Ready for Discharge:  -- anticipate back to LTC on 2/2    Disposition:     Interval History   -- continues to feel improved  -- oxygen back to baseline    -Data reviewed today: I reviewed all new labs and imaging over the last 24 hours. I personally reviewed no images or EKG's today.    Physical Exam    , Blood pressure 126/65, pulse 79, temperature 98.1  F (36.7  C), temperature source Oral, resp. rate 18, height 1.829 m (6'), weight 98.8 kg (217 lb 13 oz), SpO2  92%.  Vitals:    01/31/25 0627 02/01/25 0641 02/02/25 0705   Weight: 91.3 kg (201 lb 4.5 oz) 98.7 kg (217 lb 9.5 oz) 98.8 kg (217 lb 13 oz)     Vital Signs with Ranges  Temp:  [97.7  F (36.5  C)-99.1  F (37.3  C)] 98.1  F (36.7  C)  Pulse:  [77-87] 79  Resp:  [16-18] 18  BP: (119-146)/(65-75) 126/65  SpO2:  [79 %-95 %] 92 %  I/O's Last 24 hours  I/O last 3 completed shifts:  In: 50 [P.O.:50]  Out: 950 [Urine:950]    Constitutional: Awake, alert, cooperative, no apparent distress  Respiratory: Coarse anteriorly  Cardiovascular: Regular rate and rhythm, normal S1 and S2   GI: Normal bowel sounds, soft, non-distended, non-tender  Skin/Integumen: No rashes, no cyanosis   Other:      Medications   All medications were reviewed.  Current Facility-Administered Medications   Medication Dose Route Frequency Provider Last Rate Last Admin     Current Facility-Administered Medications   Medication Dose Route Frequency Provider Last Rate Last Admin    allopurinol (ZYLOPRIM) tablet 300 mg  300 mg Oral Parish Berry MD   300 mg at 02/02/25 0824    ampicillin (PRINCIPEN) 500 MG capsule 500 mg  500 mg Oral Q6H Steve Yeboah MD        aspirin EC tablet 81 mg  81 mg Oral Daily Parish Rawls MD   81 mg at 02/02/25 0823    atorvastatin (LIPITOR) tablet 10 mg  10 mg Oral Parish Berry MD   10 mg at 02/02/25 0823    bisacodyl (DULCOLAX) suppository 10 mg  10 mg Rectal Once per day on Monday Wednesday Friday Parish Rawls MD   10 mg at 01/31/25 0853    carboxymethylcellulose PF (REFRESH PLUS) 0.5 % ophthalmic solution 2 drop  2 drop Both Eyes BID Parish Rawls MD   2 drop at 02/02/25 0846    enoxaparin ANTICOAGULANT (LOVENOX) injection 40 mg  40 mg Subcutaneous Q24H Parish Rawls MD   40 mg at 02/01/25 1606    formoterol (PERFOROMIST) neb solution 20 mcg  20 mcg Nebulization Q12H Parish Rawls MD   20 mcg at 02/02/25 0718    furosemide (LASIX) tablet 20 mg  20 mg Oral Daily Steve Rendon,  MD        metoprolol tartrate (LOPRESSOR) half-tab 12.5 mg  12.5 mg Oral BID Parish Rawls MD   12.5 mg at 02/02/25 0823    pantoprazole (PROTONIX) EC tablet 40 mg  40 mg Oral BID Parish Rawls MD   40 mg at 02/02/25 0824    QUEtiapine (SEROquel) tablet 25 mg  25 mg Oral At Bedtime Steve Rendon MD   25 mg at 02/01/25 2200    senna-docusate (SENOKOT-S/PERICOLACE) 8.6-50 MG per tablet 2 tablet  2 tablet Oral BID Parish Rawls MD   2 tablet at 02/02/25 0822    sertraline (ZOLOFT) tablet 75 mg  75 mg Oral Daily Parish Rawls MD   75 mg at 02/02/25 0823    sodium chloride (PF) 0.9% PF flush 3 mL  3 mL Intracatheter Q8H Parish Rawls MD   3 mL at 02/02/25 1047    umeclidinium-vilanterol (ANORO ELLIPTA) 62.5-25 MCG/ACT oral inhaler 1 puff  1 puff Inhalation Daily Parish Rawls MD   1 puff at 02/02/25 0824    Vitamin D3 (CHOLECALCIFEROL) tablet 25 mcg  25 mcg Oral Daily Parish Rawls MD   25 mcg at 02/02/25 0823        Data   Recent Labs   Lab 02/01/25  1410 01/31/25  0830 01/30/25  1222 01/30/25  0855 01/30/25  0853 01/29/25  1713 01/29/25  0950   WBC  --   --   --   --  10.0  --  16.7*   HGB  --   --   --   --  10.0*  --  11.3*   MCV  --   --   --   --  86  --  83     --   --   --  170  --  199   NA  --   --   --   --  137  --  136   POTASSIUM 4.3 4.7  --   --  5.1  --  4.7   CHLORIDE  --   --   --   --  102  --  99   CO2  --   --   --   --  30*  --  29   BUN  --   --   --   --  17.1  --  18.8   CR  --  0.94  --   --  0.85  --  0.86   ANIONGAP  --   --   --   --  5*  --  8   RAJ  --   --   --   --  8.6*  --  9.0   GLC  --   --  143* 105* 116*   < > 178*   ALBUMIN  --   --   --   --  3.0*  --  3.4*   PROTTOTAL  --   --   --   --  6.8  --  7.8   BILITOTAL  --   --   --   --  0.5  --  0.3   ALKPHOS  --   --   --   --  72  --  92   ALT  --   --   --   --  9  --  12   AST  --   --   --   --  13  --  21    < > = values in this interval not displayed.       No results found for this or  any previous visit (from the past 24 hours).    Steve Rendon MD  Text Page  (7am to 6pm)

## 2025-02-02 NOTE — PROGRESS NOTES
RECEIVING UNIT ED HANDOFF REVIEW    ED Nurse Handoff Report was reviewed by: Denilson Mcintosh RN on February 2, 2025 at 5:02 PM

## 2025-02-02 NOTE — PLAN OF CARE
Goal Outcome Evaluation:          1900- 2330     Orientation: A&Ox4, garblemaria e speech  Aggression Stop Light: Green  Activity: A2 w/lift, T/R q 2 hrs  Diet/BS Checks: Regular, mildly-thickened liquids  Tele: NS  IV Access/Drains: L PIV SL  Pain Management: Denies pain  Abnormal VS/Results: VSS on 2L NC  Bowel/Bladder: Cardona in place, no BM this shift  Skin/Wounds: Blanchable redness to coccyx, scattered bruising, +1 BLE edema  Consults: SW  D/C Disposition: Pending improvement  Other Info: had bed bath on this sift , A&O turn and repo takes pill whole with apple sauce

## 2025-02-02 NOTE — PROGRESS NOTES
Care Management Follow Up    Length of Stay (days): 4    Expected Discharge Date: 02/03/2025     Concerns to be Addressed:       Patient plan of care discussed at interdisciplinary rounds: Yes    Anticipated Discharge Disposition: Assisted Living              Anticipated Discharge Services:  [facility]  Anticipated Discharge DME:      Patient/family educated on Medicare website which has current facility and service quality ratings:    Education Provided on the Discharge Plan:    Patient/Family in Agreement with the Plan:      Referrals Placed by CM/SW: [facility] Care Coordination, Homecare  Private pay costs discussed: Not applicable    Discussed  Partnership in Safe Discharge Planning  document with patient/family: No     Handoff Completed: No, handoff not indicated or clinically appropriate    Additional Information:  Per notes Pt will be medically ready tomorrow to discharge back to [facility] (due to supervision/o2 need) set for [id number].  PCS done/submitted.   MD/Charge updated on time    Talked to Klarissa at facility who will leave message for staff  CC on 2/3 will need to call to confirm with nursing    [facility] [phone]  SOFIA Kuhn Care Manager   discharge orders be faxed to [phone].   Meds [facility]  Pt open to [facility]  Fax: [phone], Phone: [phone].  Need resumption orders    Updated step dtr López.  She would like update from MD and discuss hospice (see note from 1/30 CC).   Malia message to MD to call López      Next Steps: Follow for discharge back to [facility]    Flora Maurice RN

## 2025-02-02 NOTE — PLAN OF CARE
Goal Outcome Evaluation:  Orientation: A&Ox4, garbled speech. Angry to be awakened  Aggression Stop Light: Green  Activity: A2 w/lift, T/R q 2 hrs at tolerated  Diet/BS Checks: Regular, mildly-thickened liquids- drank ~  50cc tonight  Tele: none  IV Access/Drains: L PIV SL for abx  Pain Management: Denies pain  Abnormal VS/Results: VSS on 2L NC  Bowel/Bladder: Cardona in place, no BM this shift  Skin/Wounds: Blanchable redness to coccyx, scattered bruising, +1 BLE edema  Consults: SW  D/C Disposition: Pending improvement  Other Info: takes pill whole with apple sauce    2/1/2025 [id number]

## 2025-02-03 VITALS
WEIGHT: 212.74 LBS | TEMPERATURE: 97.8 F | RESPIRATION RATE: 18 BRPM | SYSTOLIC BLOOD PRESSURE: 127 MMHG | DIASTOLIC BLOOD PRESSURE: 67 MMHG | HEIGHT: 72 IN | OXYGEN SATURATION: 95 % | BODY MASS INDEX: 28.82 KG/M2 | HEART RATE: 82 BPM

## 2025-02-03 LAB
CREAT SERPL-MCNC: 0.86 MG/DL (ref 0.67–1.17)
EGFRCR SERPLBLD CKD-EPI 2021: 86 ML/MIN/1.73M2
POTASSIUM SERPL-SCNC: 4.2 MMOL/L (ref 3.4–5.3)

## 2025-02-03 PROCEDURE — 250N000013 HC RX MED GY IP 250 OP 250 PS 637: Performed by: HOSPITALIST

## 2025-02-03 PROCEDURE — 84132 ASSAY OF SERUM POTASSIUM: CPT | Performed by: INTERNAL MEDICINE

## 2025-02-03 PROCEDURE — 250N000009 HC RX 250: Performed by: HOSPITALIST

## 2025-02-03 PROCEDURE — 36415 COLL VENOUS BLD VENIPUNCTURE: CPT | Performed by: INTERNAL MEDICINE

## 2025-02-03 PROCEDURE — 99239 HOSP IP/OBS DSCHRG MGMT >30: CPT | Performed by: INTERNAL MEDICINE

## 2025-02-03 PROCEDURE — 94640 AIRWAY INHALATION TREATMENT: CPT

## 2025-02-03 PROCEDURE — 82565 ASSAY OF CREATININE: CPT | Performed by: INTERNAL MEDICINE

## 2025-02-03 PROCEDURE — 250N000013 HC RX MED GY IP 250 OP 250 PS 637: Performed by: INTERNAL MEDICINE

## 2025-02-03 PROCEDURE — 999N000157 HC STATISTIC RCP TIME EA 10 MIN

## 2025-02-03 RX ORDER — QUETIAPINE FUMARATE 25 MG/1
25 TABLET, FILM COATED ORAL AT BEDTIME
Qty: 30 TABLET | Refills: 0 | Status: SHIPPED | OUTPATIENT
Start: 2025-02-03 | End: 2025-02-03

## 2025-02-03 RX ORDER — AMPICILLIN 500 MG/1
500 CAPSULE ORAL EVERY 6 HOURS
Qty: 8 CAPSULE | Refills: 0 | Status: SHIPPED | OUTPATIENT
Start: 2025-02-03 | End: 2025-02-05

## 2025-02-03 RX ORDER — GUAIFENESIN AND DEXTROMETHORPHAN HYDROBROMIDE 600; 30 MG/1; MG/1
1 TABLET, EXTENDED RELEASE ORAL EVERY 12 HOURS
Qty: 10 TABLET | Refills: 0 | Status: SHIPPED | OUTPATIENT
Start: 2025-02-03 | End: 2025-02-08

## 2025-02-03 RX ORDER — QUETIAPINE FUMARATE 25 MG/1
12.5 TABLET, FILM COATED ORAL AT BEDTIME
Qty: 30 TABLET | Refills: 0 | Status: SHIPPED | OUTPATIENT
Start: 2025-02-03

## 2025-02-03 RX ADMIN — UMECLIDINIUM BROMIDE AND VILANTEROL TRIFENATATE 1 PUFF: 62.5; 25 POWDER RESPIRATORY (INHALATION) at 09:08

## 2025-02-03 RX ADMIN — FORMOTEROL FUMARATE DIHYDRATE 20 MCG: 20 SOLUTION RESPIRATORY (INHALATION) at 07:01

## 2025-02-03 RX ADMIN — METOPROLOL TARTRATE 12.5 MG: 25 TABLET, FILM COATED ORAL at 09:09

## 2025-02-03 RX ADMIN — ASPIRIN 81 MG: 81 TABLET, COATED ORAL at 09:09

## 2025-02-03 RX ADMIN — ATORVASTATIN CALCIUM 10 MG: 10 TABLET, FILM COATED ORAL at 09:09

## 2025-02-03 RX ADMIN — FUROSEMIDE 20 MG: 20 TABLET ORAL at 09:09

## 2025-02-03 RX ADMIN — CARBOXYMETHYLCELLULOSE SODIUM 2 DROP: 5 SOLUTION/ DROPS OPHTHALMIC at 09:07

## 2025-02-03 RX ADMIN — AMPICILLIN 500 MG: 500 CAPSULE ORAL at 06:04

## 2025-02-03 RX ADMIN — AMPICILLIN 500 MG: 500 CAPSULE ORAL at 00:15

## 2025-02-03 RX ADMIN — Medication 25 MCG: at 09:09

## 2025-02-03 RX ADMIN — PANTOPRAZOLE SODIUM 40 MG: 40 TABLET, DELAYED RELEASE ORAL at 09:08

## 2025-02-03 RX ADMIN — SENNOSIDES AND DOCUSATE SODIUM 2 TABLET: 50; 8.6 TABLET ORAL at 09:09

## 2025-02-03 RX ADMIN — SERTRALINE HYDROCHLORIDE 75 MG: 50 TABLET ORAL at 09:08

## 2025-02-03 ASSESSMENT — ACTIVITIES OF DAILY LIVING (ADL)
ADLS_ACUITY_SCORE: 80
ADLS_ACUITY_SCORE: 72
ADLS_ACUITY_SCORE: 80

## 2025-02-03 NOTE — DISCHARGE SUMMARY
[facility]  Hospitalist Discharge Summary      Date of Admission:  1/29/2025  Date of Discharge:  2/3/2025  Discharging Provider: Steve Rendon MD  Discharge Service: Hospitalist Service    Discharge Diagnoses         Pneumonia, suspect aspiration with sepsis  Acute on chronic hypoxic respiratory failure  Acute diastolic congestive heart failure           COPD with chronic hypoxic respiratory failure  BRAD        Suspect CAUTI  BPH  Chronic indwelling Cardona catheter        History of CVA, residual left-sided anna-plegia  Hypertension  Hyperlipidemia        Depression and anxiety        DM2        GERD        Gout        Chronic anemia         Disposition Plan  -- back to Assisted living, he uses a wheel chair     Medically Ready for Discharge:  -- anticipate back to LTC on 2/2       Clinically Significant Risk Factors     # Overweight: Estimated body mass index is 28.85 kg/m  as calculated from the following:    Height as of this encounter: 1.829 m (6').    Weight as of this encounter: 96.5 kg (212 lb 11.9 oz).       Follow-ups Needed After Discharge   Follow-up Appointments       Hospital Follow-up with Existing Primary Care Provider (PCP)      Please see details below         Schedule Primary Care visit within: 14 Days           {Additional follow-up instructions/to-do's for PCP      Unresulted Labs Ordered in the Past 30 Days of this Admission       No orders found from 12/30/2024 to 1/30/2025.        These results will be followed up by PCP    Discharge Disposition   Discharged to assisted living  Condition at discharge: Stable    Hospital Course   Ron Gilmore is a 84 year old male with medical history significant for CVA with left-sided weakness, dysphagia, chronic indwelling Cardona catheter with UTI and sepsis, DM2, BRAD, COPD and chronic hypoxic respiratory failure, depression and anxiety, BPH, GERD, gout was sent to the ER for evaluation of fever and worsening hypoxia.  Found  to have pneumonia and admitted on 1/29/2025.       Pneumonia, suspect aspiration with sepsis  Acute on chronic hypoxic respiratory failure  Acute diastolic congestive heart failure  Reported fever 101 per patient, dyspnea and fatigue.  Leukocytosis 16.7, mildly tachycardic.  CXR with prominent LLL opacity but also has diffuse interstitial opacities bilaterally, concerning pulmonary edema but clinically appears dehydrated, proBNP normal and no overt crackles.  Hypoxic needing 10 LPM, now down to 6 in ER.  Baseline 2 LPM O2 need.  -Blood cultures and urine culture sent, UA grew Pseudomonas and Enterococcus in the past  -initially treated with Zosyn and azithromycin   -Supplemental O2, he is on 2L which is his baseline  -Received 1L NS, although CXR shows interstitial opacity concerning for fluid overload, appears dry clinically, tolerated bolus fluid in ER,  cc/hour x 5 hours and monitor respiratory status  - CBC back to [place], no fever, oxygen back to baseline.  - was given seroquel for sleep and patient feels much better         COPD with chronic hypoxic respiratory failure  BRAD  No excessive wheezing.  Uses supplemental O2 2 LPM at baseline.  Increased hypoxia due to pneumonia as above.  No concern for COPD exacerbation.  -Continue PTA inhalers last nebs  -continue mucinex     Suspect CAUTI  BPH  Chronic indwelling Cardona catheter  Per ER physician, indwelling catheter was replaced in ER  - Ucx shows enterococcus fecalis    - change from Zosyn to ampicillin to complete 7 day course (until 2/4/25)     History of CVA, residual left-sided anna-plegia  Hypertension  Hyperlipidemia  Continue PTA aspirin, Lipitor, metoprolol  -continue Lasix  -Patient reports he is on regular diet.  Monitor for signs of aspiration, SLP eval if noted     Depression and anxiety  -Continue PTA sertraline  -continue seroquel at bedtime for sleep     DM2  Noted HbA1c 6.4 last month, prediabetes.  Not on medication.       GERD  -Continue  PTA PPI     Gout  -Continue PTA allopurinol     Chronic anemia  -Hemoglobin has fluctuated, 9-10 is likely his baseline.  Presents with 11.3.  Likely hemoconcentrated, anticipate some drop with IV fluid and given his acute illness, monitor        Diet:  Regular   DVT Prophylaxis: Enoxaparin (Lovenox) SQ  Cardona Catheter: PRESENT, indication:    Lines: None     Cardiac Monitoring: None  Code Status:  DNR/DNI, discussed with patient, POLST reviewed.        Clinically Significant Risk Factors Present on Admission               # Hypoalbuminemia: Lowest albumin = 3.4 g/dL at 1/29/2025  9:50 AM, will monitor as appropriate          # Overweight: Estimated body mass index is 27.12 kg/m  as calculated from the following:    Height as of this encounter: 1.829 m (6').    Weight as of this encounter: 90.7 kg (200 lb).       # Financial/Environmental Concerns:           Disposition Plan  -- back to Assisted living, he uses a wheel chair     Medically Ready for Discharge:  -- anticipate back to LTC on 2/2     Consultations This Hospital Stay   CARE MANAGEMENT / SOCIAL WORK IP CONSULT  CARE MANAGEMENT / SOCIAL WORK IP CONSULT    Code Status   No CPR- Do NOT Intubate    Time Spent on this Encounter   I, Steve Rendon MD, personally saw the patient today and spent greater than 30 minutes discharging this patient.       Steve Rendon MD  [place] [address] [facility]  [address] [place] [address] AVE, [address] [address]  Phone: [phone]  ______________________________________________________________________    Physical Exam   Vital Signs: Temp: 97.8  F (36.6  C) Temp src: Oral BP: 127/67 Pulse: 82   Resp: 18 SpO2: 95 % O2 Device: Nasal cannula Oxygen Delivery: 2 LPM  Weight: 212 lbs 11.9 oz         Primary Care Physician   [facility]    Discharge Orders      Reason for your hospital stay    Admit with UTI and possible pneumonia     Activity    Your activity upon discharge: activity as tolerated      Resume Home Care Services     Diet    Follow this diet upon discharge: Current Diet:Orders Placed This Encounter      Room Service      Regular Diet Adult Mildly Thick (level 2)     Hospital Follow-up with Existing Primary Care Provider (PCP)    Please see details below            Significant Results and Procedures   Most Recent 3 CBC's:  Recent Labs   Lab Test 02/01/25  1410 01/30/25  0853 01/29/25  0950 01/01/25  0619   WBC  --  10.0 16.7* 5.1   HGB  --  10.0* 11.3* 11.2*   MCV  --  86 83 85    170 199 133*     Most Recent 3 BMP's:  Recent Labs   Lab Test 02/03/25  0801 02/02/25  1416 02/01/25  1410 01/31/25  0830 01/30/25  1222 01/30/25  0855 01/30/25  0853 01/29/25  1713 01/29/25  0950 01/01/25  1138 01/01/25  0619   NA  --   --   --   --   --   --  137  --  136  --  145   POTASSIUM 4.2 4.3 4.3 4.7  --   --  5.1  --  4.7  --  3.8   CHLORIDE  --   --   --   --   --   --  102  --  99  --  103   CO2  --   --   --   --   --   --  30*  --  29  --  33*   BUN  --   --   --   --   --   --  17.1  --  18.8  --  26.5*   CR 0.86  --   --  0.94  --   --  0.85  --  0.86  --  1.05   ANIONGAP  --   --   --   --   --   --  5*  --  8  --  9   RAJ  --   --   --   --   --   --  8.6*  --  9.0  --  8.9   GLC  --   --   --   --  143* 105* 116*   < > 178*   < > 96    < > = values in this interval not displayed.   ,   Results for orders placed or performed during the hospital encounter of 01/29/25   XR Chest 2 Views    Narrative    EXAM: XR CHEST 2 VIEWS  LOCATION: [facility]  DATE: 1/29/2025    INDICATION: Fever, cough, hypoxia.  COMPARISON: 12/28/2024.      Impression    IMPRESSION:   Cardiomegaly is accentuated by low lung volumes. Diffuse interstitial infiltrates may be due to edema and CHF. Infiltrate or small effusion is present in the left base. No pneumothorax. No significant bony abnormalities.       Discharge Medications   Current Discharge Medication List        START taking these  medications    Details   ampicillin (PRINCIPEN) 500 MG capsule Take 1 capsule (500 mg) by mouth every 6 hours for 2 days.  Qty: 8 capsule, Refills: 0    Associated Diagnoses: Urinary tract infection without hematuria, site unspecified      dextromethorphan-guaiFENesin (MUCINEX DM)  MG 12 hr tablet Take 1 tablet by mouth every 12 hours for 5 days.  Qty: 10 tablet, Refills: 0    Associated Diagnoses: Pneumonia of both lower lobes due to infectious organism      QUEtiapine (SEROQUEL) 25 MG tablet Take 0.5 tablets (12.5 mg) by mouth at bedtime.  Qty: 30 tablet, Refills: 0    Associated Diagnoses: Mild episode of recurrent major depressive disorder           CONTINUE these medications which have NOT CHANGED    Details   acetaminophen (TYLENOL) 325 MG tablet Take 650 mg by mouth every 4 hours as needed for mild pain as needed for pain/fever Max dose 3000mg/24hr      allopurinol (ZYLOPRIM) 300 MG tablet Take 300 mg by mouth every morning 0900      ammonium lactate (LAC-HYDRIN) 12 % external lotion Apply topically daily Apply a thin film to bilateral feet and legs      aspirin (ASA) 81 MG EC tablet Take 1 tablet (81 mg) by mouth daily  Qty:      Associated Diagnoses: Acute and chronic respiratory failure with hypoxia (H)      atorvastatin (LIPITOR) 10 MG tablet Take 10 mg by mouth every morning 0900      bisacodyl (DULCOLAX) 10 MG suppository Place 10 mg rectally three times a week. Mon/Wed/Fri      formoterol (PERFOROMIST) 20 MCG/2ML neb solution Take 20 mcg by nebulization every 12 hours 1000, 2300      furosemide (LASIX) 20 MG tablet Take 20 mg by mouth daily 0900  Qty: 45 tablet, Refills: 0    Associated Diagnoses: Acute diastolic congestive heart failure (H)      !! hypromellose (ARTIFICIAL TEARS) 0.5 % SOLN ophthalmic solution Place 2 drops into both eyes 2 times daily 0800, 2000      !! hypromellose (ARTIFICIAL TEARS) 0.5 % SOLN ophthalmic solution Place 2 drops into both eyes 2 times daily as needed for dry  eyes      ipratropium - albuterol 0.5 mg/2.5 mg/3 mL (DUONEB) 0.5-2.5 (3) MG/3ML neb solution Take 1 vial by nebulization 3 times daily as needed for shortness of breath, wheezing or cough.      ipratropium-albuterol (COMBIVENT RESPIMAT)  MCG/ACT inhaler Inhale 1 puff into the lungs 4 times daily 0900, 1200 ,1600 ,2000      loperamide (IMODIUM) 2 MG capsule Take 2 mg by mouth every 6 hours as needed for diarrhea      melatonin 3 MG tablet Take 3 mg by mouth at bedtime. May repeat dose if not effective      methenamine hippurate (HIPREX) 1 g tablet Take 1 g by mouth 2 times daily 0900, 2000      metoprolol tartrate (LOPRESSOR) 25 MG tablet Take 0.5 tablets (12.5 mg) by mouth 2 times daily    Associated Diagnoses: Cerebrovascular accident (CVA), unspecified mechanism (H)      pantoprazole (PROTONIX) 40 MG EC tablet Take 40 mg by mouth 2 times daily 0900, 2000      !! senna-docusate (SENOKOT-S/PERICOLACE) 8.6-50 MG tablet Take 2 tablets by mouth 2 times daily. 0900, 2000      !! senna-docusate (SENOKOT-S/PERICOLACE) 8.6-50 MG tablet Take 1 tablet by mouth daily as needed for constipation      sertraline (ZOLOFT) 50 MG tablet Take 75 mg by mouth daily. 0900      simethicone (MYLICON) 125 MG chewable tablet Take 125 mg by mouth 3 times daily as needed for intestinal gas      Vitamin D3 (VITAMIN D, CHOLECALCIFEROL,) 25 mcg (1000 units) tablet Take 1 tablet by mouth daily 0800       !! - Potential duplicate medications found. Please discuss with provider.        Allergies   No Known Allergies

## 2025-02-03 NOTE — PROVIDER NOTIFICATION
"MD Notification    Notified Person: MD    Notified Person Name: Dr. Saavedra     Notification Date/Time: 2/2/2025 at 2136    Notification Interaction: [place] web messaging     Purpose of Notification:  SBAR Situation: 802 M.B. pt reporting \"bladder fullness\".  also reporting pressure near tip of penis stating he feels like it is \"clogged\". Has chronic chirinos. chirinos replaced in ED on admission. urine cultulre +, on oral ampicillin. slight pink tinged urine noted with sediment in chirinos tubing. pt stated sediment in baseline. bladder scanned for 148mL. any interventions?    Orders Received:  Flush chirinos    Comments:   "

## 2025-02-03 NOTE — PROGRESS NOTES
Care Management Discharge Note    Discharge Date: 02/03/2025       Discharge Disposition: Assisted Living    Discharge Services:      Discharge DME:      Discharge Transportation:      Private pay costs discussed: Not applicable    Does the patient's insurance plan have a 3 day qualifying hospital stay waiver?  No    PAS Confirmation Code:    Patient/family educated on Medicare website which has current facility and service quality ratings:      Education Provided on the Discharge Plan:    Persons Notified of Discharge Plans:   Patient/Family in Agreement with the Plan:      Handoff Referral Completed: No, handoff not indicated or clinically appropriate    Additional Information:  Patient discharging today.    AINSLEY Kuhn at [facility] aware and accepting.      Stretcher ride previously scheduled for today [id number].  The PCS was submitted yesterday.    Meds were filled here to be sent with patient.  Hattie was okay with meds filled here and sent with patient.    Orders and MAR faxed to [facility] attn Hattie fax [phone] via communications tab.    Patient open with [facility]; called them to advise of today's discharge as well as faxed home care discharge orders to them.    The Care Coordinator updated daughter López yesterday.      Rossana Zuleta RN  Inpatient Float Care Coordinator  [facility]  [email or web address]

## 2025-02-03 NOTE — PLAN OF CARE
"[id number]    Orientation: A&Ox4  Aggression Stop Light: green   Activity: Ax2 T/R.  Diet/BS Checks: regular with mildly thickened liquids  Tele:  none  IV Access/Drains: R&L PIV SL  Pain Management: had discomfort at tip of penis, MD notified, chirinos flushed with good relief.  Abnormal VS/Results: Vss on 2L NC  Bowel/Bladder: chirinos in place. 1 incontinent BM this shift  Skin/Wounds: scattered bruising and scabs. Blanchable redness to sacrum/buttocks.   Consults: SW.   D/C Disposition: pending back to [facility] 2/3 between [id number]    Other Info:   VSS on 2L NC  Reported \"bladder fullness\" and pain at tip of penis stating his chirinos \"felt clogged\", MD notified, chirinos flushed per orders with good relief.   K replacement protocol.   Baseline L sided deficits from previous CVA  Remains on contact precautions.             "

## 2025-02-03 NOTE — PLAN OF CARE
Orientation: A&Ox4, garbled speech  Aggression Stop Light: Green  Activity: Ax2 w/lift, T/R q2 hrs  Diet/BS Checks: Regular diet, mildly-thickened liquids.   Tele: N/A  IV Access/Drains: R & L PIV SL.  Pain Management: Denied pain this shift  Abnormal VS/Results: VSS on 2L via NC.   Bowel/Bladder: Incontinent of bowel, No BM this shift. Cardona in place with dark allan UOP  Skin/Wounds: Blanchable redness with excoriation on sacrum/coccyx- mepi in place, scattered bruising and scabs, trace edema to BLE  Consults: SW  D/C Disposition: Plan to discharge tomorrow to LTC. Ride set up for [id number]  Other Info:   - On K+ replacement, recheck in AM  - Transitioned to oral abx this shift  - Takes meds whole in applesauce/pudding

## 2025-02-04 ENCOUNTER — PATIENT OUTREACH (OUTPATIENT)
Dept: CARE COORDINATION | Facility: CLINIC | Age: 85
End: 2025-02-04
Payer: MEDICARE

## 2025-02-04 NOTE — PROGRESS NOTES
Connected Care Resource [place]    Background: Transitional Care Management program identified per system criteria and reviewed by [facility] team for possible outreach.    Assessment: Upon chart review, [facility] Team member will not proceed with patient outreach related to this episode of Transitional Care Management program due to reason below:    Patient has discharged to a Memory Care, Long-term Care, Assisted Living or Group Home where patient is receiving on-site support with their daily cares, including support with hospital follow up plan.    Plan: Transitional Care Management episode addressed appropriately per reason noted above.      BRANDY Fernandez  Connected Care Resource [facility]    *Connected Care Resource Team does NOT follow patient ongoing. Referrals are identified based on internal discharge reports and the outreach is to ensure patient has an understanding of their discharge instructions.

## 2025-04-16 ENCOUNTER — HOSPITAL ENCOUNTER (INPATIENT)
Facility: CLINIC | Age: 85
DRG: 178 | End: 2025-04-16
Attending: EMERGENCY MEDICINE | Admitting: INTERNAL MEDICINE
Payer: MEDICARE

## 2025-04-16 ENCOUNTER — MEDICAL CORRESPONDENCE (OUTPATIENT)
Dept: HEALTH INFORMATION MANAGEMENT | Facility: CLINIC | Age: 85
End: 2025-04-16

## 2025-04-16 ENCOUNTER — APPOINTMENT (OUTPATIENT)
Dept: CT IMAGING | Facility: CLINIC | Age: 85
DRG: 178 | End: 2025-04-16
Attending: EMERGENCY MEDICINE
Payer: MEDICARE

## 2025-04-16 DIAGNOSIS — K56.41 FECAL IMPACTION IN RECTUM (H): ICD-10-CM

## 2025-04-16 DIAGNOSIS — K59.00 CONSTIPATION, UNSPECIFIED CONSTIPATION TYPE: ICD-10-CM

## 2025-04-16 DIAGNOSIS — L89.156 PRESSURE INJURY OF DEEP TISSUE OF SACRAL REGION: Primary | ICD-10-CM

## 2025-04-16 DIAGNOSIS — J44.9 CHRONIC OBSTRUCTIVE PULMONARY DISEASE, UNSPECIFIED COPD TYPE (H): ICD-10-CM

## 2025-04-16 DIAGNOSIS — J18.9 PNEUMONIA OF BOTH LOWER LOBES DUE TO INFECTIOUS ORGANISM: ICD-10-CM

## 2025-04-16 DIAGNOSIS — Z51.5 HOSPICE CARE PATIENT: ICD-10-CM

## 2025-04-16 DIAGNOSIS — J69.0 ASPIRATION PNEUMONIA OF RIGHT LOWER LOBE, UNSPECIFIED ASPIRATION PNEUMONIA TYPE (H): ICD-10-CM

## 2025-04-16 DIAGNOSIS — J96.21 ACUTE ON CHRONIC RESPIRATORY FAILURE WITH HYPOXIA (H): ICD-10-CM

## 2025-04-16 LAB
ALBUMIN SERPL BCG-MCNC: 2.9 G/DL (ref 3.5–5.2)
ALP SERPL-CCNC: 93 U/L (ref 40–150)
ALT SERPL W P-5'-P-CCNC: 22 U/L (ref 0–70)
ANION GAP SERPL CALCULATED.3IONS-SCNC: 9 MMOL/L (ref 7–15)
AST SERPL W P-5'-P-CCNC: 26 U/L (ref 0–45)
BASOPHILS # BLD AUTO: 0 10E3/UL (ref 0–0.2)
BASOPHILS NFR BLD AUTO: 0 %
BILIRUB SERPL-MCNC: 0.4 MG/DL
BUN SERPL-MCNC: 20.1 MG/DL (ref 8–23)
CALCIUM SERPL-MCNC: 8.8 MG/DL (ref 8.8–10.4)
CHLORIDE SERPL-SCNC: 95 MMOL/L (ref 98–107)
CREAT SERPL-MCNC: 0.82 MG/DL (ref 0.67–1.17)
EGFRCR SERPLBLD CKD-EPI 2021: 88 ML/MIN/1.73M2
EOSINOPHIL # BLD AUTO: 0.1 10E3/UL (ref 0–0.7)
EOSINOPHIL NFR BLD AUTO: 1 %
ERYTHROCYTE [DISTWIDTH] IN BLOOD BY AUTOMATED COUNT: 19.3 % (ref 10–15)
GLUCOSE SERPL-MCNC: 223 MG/DL (ref 70–99)
HCO3 SERPL-SCNC: 32 MMOL/L (ref 22–29)
HCT VFR BLD AUTO: 35.4 % (ref 40–53)
HGB BLD-MCNC: 10.2 G/DL (ref 13.3–17.7)
HOLD SPECIMEN: NORMAL
IMM GRANULOCYTES # BLD: 0.1 10E3/UL
IMM GRANULOCYTES NFR BLD: 1 %
LACTATE SERPL-SCNC: 2.2 MMOL/L (ref 0.7–2)
LACTATE SERPL-SCNC: 2.7 MMOL/L (ref 0.7–2)
LIPASE SERPL-CCNC: 21 U/L (ref 13–60)
LYMPHOCYTES # BLD AUTO: 0.7 10E3/UL (ref 0.8–5.3)
LYMPHOCYTES NFR BLD AUTO: 6 %
MCH RBC QN AUTO: 23.7 PG (ref 26.5–33)
MCHC RBC AUTO-ENTMCNC: 28.8 G/DL (ref 31.5–36.5)
MCV RBC AUTO: 82 FL (ref 78–100)
MONOCYTES # BLD AUTO: 0.7 10E3/UL (ref 0–1.3)
MONOCYTES NFR BLD AUTO: 6 %
NEUTROPHILS # BLD AUTO: 10.4 10E3/UL (ref 1.6–8.3)
NEUTROPHILS NFR BLD AUTO: 87 %
NRBC # BLD AUTO: 0 10E3/UL
NRBC BLD AUTO-RTO: 0 /100
PLATELET # BLD AUTO: 262 10E3/UL (ref 150–450)
POTASSIUM SERPL-SCNC: 3.8 MMOL/L (ref 3.4–5.3)
PROT SERPL-MCNC: 7.6 G/DL (ref 6.4–8.3)
RBC # BLD AUTO: 4.31 10E6/UL (ref 4.4–5.9)
SODIUM SERPL-SCNC: 136 MMOL/L (ref 135–145)
WBC # BLD AUTO: 11.9 10E3/UL (ref 4–11)

## 2025-04-16 PROCEDURE — 96375 TX/PRO/DX INJ NEW DRUG ADDON: CPT

## 2025-04-16 PROCEDURE — G0378 HOSPITAL OBSERVATION PER HR: HCPCS

## 2025-04-16 PROCEDURE — 87637 SARSCOV2&INF A&B&RSV AMP PRB: CPT | Performed by: EMERGENCY MEDICINE

## 2025-04-16 PROCEDURE — 80053 COMPREHEN METABOLIC PANEL: CPT | Performed by: EMERGENCY MEDICINE

## 2025-04-16 PROCEDURE — 99222 1ST HOSP IP/OBS MODERATE 55: CPT | Mod: AI | Performed by: INTERNAL MEDICINE

## 2025-04-16 PROCEDURE — 96365 THER/PROPH/DIAG IV INF INIT: CPT | Mod: 59

## 2025-04-16 PROCEDURE — 87040 BLOOD CULTURE FOR BACTERIA: CPT | Performed by: EMERGENCY MEDICINE

## 2025-04-16 PROCEDURE — 83690 ASSAY OF LIPASE: CPT | Performed by: EMERGENCY MEDICINE

## 2025-04-16 PROCEDURE — 99285 EMERGENCY DEPT VISIT HI MDM: CPT | Mod: 25

## 2025-04-16 PROCEDURE — 96361 HYDRATE IV INFUSION ADD-ON: CPT

## 2025-04-16 PROCEDURE — 258N000003 HC RX IP 258 OP 636: Performed by: EMERGENCY MEDICINE

## 2025-04-16 PROCEDURE — 250N000009 HC RX 250: Performed by: EMERGENCY MEDICINE

## 2025-04-16 PROCEDURE — 74177 CT ABD & PELVIS W/CONTRAST: CPT

## 2025-04-16 PROCEDURE — 250N000011 HC RX IP 250 OP 636: Performed by: EMERGENCY MEDICINE

## 2025-04-16 PROCEDURE — 96367 TX/PROPH/DG ADDL SEQ IV INF: CPT

## 2025-04-16 PROCEDURE — 83605 ASSAY OF LACTIC ACID: CPT | Performed by: EMERGENCY MEDICINE

## 2025-04-16 PROCEDURE — 85025 COMPLETE CBC W/AUTO DIFF WBC: CPT | Performed by: EMERGENCY MEDICINE

## 2025-04-16 PROCEDURE — 36415 COLL VENOUS BLD VENIPUNCTURE: CPT | Performed by: EMERGENCY MEDICINE

## 2025-04-16 RX ORDER — PIPERACILLIN SODIUM, TAZOBACTAM SODIUM 4; .5 G/20ML; G/20ML
4.5 INJECTION, POWDER, LYOPHILIZED, FOR SOLUTION INTRAVENOUS EVERY 6 HOURS
Status: DISCONTINUED | OUTPATIENT
Start: 2025-04-16 | End: 2025-04-18 | Stop reason: HOSPADM

## 2025-04-16 RX ORDER — IOPAMIDOL 755 MG/ML
106 INJECTION, SOLUTION INTRAVASCULAR ONCE
Status: COMPLETED | OUTPATIENT
Start: 2025-04-16 | End: 2025-04-16

## 2025-04-16 RX ORDER — HYDROMORPHONE HYDROCHLORIDE 1 MG/ML
0.5 INJECTION, SOLUTION INTRAMUSCULAR; INTRAVENOUS; SUBCUTANEOUS EVERY 30 MIN PRN
Status: DISCONTINUED | OUTPATIENT
Start: 2025-04-16 | End: 2025-04-17

## 2025-04-16 RX ORDER — AZITHROMYCIN MONOHYDRATE 500 MG/5ML
500 INJECTION, POWDER, LYOPHILIZED, FOR SOLUTION INTRAVENOUS ONCE
Status: COMPLETED | OUTPATIENT
Start: 2025-04-16 | End: 2025-04-17

## 2025-04-16 RX ADMIN — HYDROMORPHONE HYDROCHLORIDE 0.5 MG: 1 INJECTION, SOLUTION INTRAMUSCULAR; INTRAVENOUS; SUBCUTANEOUS at 19:20

## 2025-04-16 RX ADMIN — SODIUM CHLORIDE 72 ML: 9 INJECTION, SOLUTION INTRAVENOUS at 21:32

## 2025-04-16 RX ADMIN — IOPAMIDOL 106 ML: 755 INJECTION, SOLUTION INTRAVENOUS at 21:33

## 2025-04-16 RX ADMIN — AZITHROMYCIN MONOHYDRATE 500 MG: 500 INJECTION, POWDER, LYOPHILIZED, FOR SOLUTION INTRAVENOUS at 23:05

## 2025-04-16 RX ADMIN — SODIUM CHLORIDE 1000 ML: 9 INJECTION, SOLUTION INTRAVENOUS at 20:53

## 2025-04-16 RX ADMIN — PIPERACILLIN AND TAZOBACTAM 4.5 G: 4; .5 INJECTION, POWDER, FOR SOLUTION INTRAVENOUS at 21:27

## 2025-04-16 RX ADMIN — SODIUM CHLORIDE 500 ML: 0.9 INJECTION, SOLUTION INTRAVENOUS at 22:58

## 2025-04-16 ASSESSMENT — ACTIVITIES OF DAILY LIVING (ADL)
ADLS_ACUITY_SCORE: 59

## 2025-04-16 NOTE — ED PROVIDER NOTES
Emergency Department Note      History of Present Illness     Chief Complaint   Abdominal pain     THUY Gilmore is a 84 year old male with a past medical history significant for CVA, DVT, type 2 diabetes, hypertension , hyperlipidemia, COPD here for evaluation of abdominal pain and constipation. Patient was brought in by ambulance from [facility] in [place] where patient is on hospice. Patient reported to EMS that he has had intermittent abdominal pain since last July, reports increased pain in the last few weeks. He is on 5 mg subinguinal tablet of Morphine as needed in hospice care. He has not had a bowel movement for 7 days. He is on 2 L oxygen at baseline, 91-92 is his normal. Temp is 99.3 Patient is on hospice care. Patient has history of appendectomy and cholecystectomy. Denies history of bowel obstruction. No fever, nausea, vomiting.  I spoke with his daughter who states that the patient has a history of aspiration and that he was on antibiotics for pneumonia until a couple weeks ago when he was improving but then antibiotics were restarted yesterday due to concern for pneumonia.    Independent Historian   None    Review of External Notes   I reviewed the medication list from [facility]. No known drug allergies   I reviewed the patient's hospitalization from 1/29/25-2/3/25 - Patient was admitted for pneumonia suspected to be due to aspiration pneumonia with sepsis, also had acute on chronic respiratory failure, CHF and suspected CAUTI, has chronic indwelling chirinos catheter, history CVA with residual left sided hemiplegia     Past Medical History     Medical History and Problem List   Cataract  Cholelithiasis  COPD  Depression  Diabetes mellitus  Dyshidrotic foot dermatitis  Edema  Gout  HLD  HTN  Kidney stones  Cholelithiasis  Lumbago  Lumbar disc displacement without myelopathy  Neuropathy, diabetic  Obesity  Spinal stenosis  Stroke  Urinary retention with  incomplete bladder emptying  UTI  Vasovagal episode  SIRS  Vasovagal episode  CAP   Tibia/fibula fracture  Closed tibia fracture  CVA  Metabolic encephalopathy  Severe sepsis  Lactic acidosis  Obstructive right sided ureteral stone resulting in hydronephrosis  Acute respiratory failure with hypoxia  Acute and chronic respiratory failure with hypoxia  Iron deficiency anemia  Hemiparesis  DVT  Gastrointestinal hemorrhage  DNR  Chronic indwelling Cardona catheter    Medications   Lasix   Protonix  Metoprolol tartrate   Duoneb   Seroquel       Surgical History   Appendectomy  Arthroscopy shoulder and rotator cuff repair  Cataract removal  Cholecystectomy  Colonoscopy  Cystoscopy  EP loop recorder implant  EGD  Eye surgery  IVC filter placement  Nephrostomy tube placement  Ureteral stent placement right  Joint hip replacement  Knee surgery  Laminectomy lumbar  Laser holmium lithotripsy and stent insertion  Tonsillectomy      Physical Exam     Patient Vitals for the past 24 hrs:   BP Temp Temp src Pulse Resp SpO2 Height Weight   04/16/25 1830 118/56 99.3  F (37.4  C) Oral 107 16 92 % 1.829 m (6') 96.2 kg (212 lb)     Physical Exam  Nursing note and vitals reviewed.  Constitutional:  Appears well-developed and well-nourished.   HENT:   Head:    Atraumatic.   Mouth/Throat:   Oropharynx is clear and moist. No oropharyngeal exudate.   Eyes:    Pupils are equal, round, and reactive to light.   Neck:    Normal range of motion. Neck supple.      No tracheal deviation present. No thyromegaly present.   Cardiovascular:  Normal rate, regular rhythm, no murmur   Pulmonary/Chest: Breath sounds are clear and equal without wheezes or crackles.  Abdominal:   Soft. Bowel sounds are normal. Exhibits no distension and      no mass. There is no tenderness. Diffuse abdominal tenderness guarding in right lower quadrant and the left side of the abdomen.   :   There is a large amount of hard stool in the rectal vault.      There is no rebound  and no guarding.   Musculoskeletal:  Exhibits no edema.   Lymphadenopathy:  No cervical adenopathy.   Neurological:   Alert and oriented to person, place, and time.   Skin:    Skin is warm and dry. No rash noted. No pallor.      Diagnostics     Lab Results   Labs Ordered and Resulted from Time of ED Arrival to Time of ED Departure   COMPREHENSIVE METABOLIC PANEL - Abnormal       Result Value    Sodium 136      Potassium 3.8      Carbon Dioxide (CO2) 32 (*)     Anion Gap 9      Urea Nitrogen 20.1      Creatinine 0.82      GFR Estimate 88      Calcium 8.8      Chloride 95 (*)     Glucose 223 (*)     Alkaline Phosphatase 93      AST 26      ALT 22      Protein Total 7.6      Albumin 2.9 (*)     Bilirubin Total 0.4     LACTIC ACID WHOLE BLOOD WITH 1X REPEAT IN 2 HR WHEN >2 - Abnormal    Lactic Acid, Initial 2.7 (*)    CBC WITH PLATELETS AND DIFFERENTIAL - Abnormal    WBC Count 11.9 (*)     RBC Count 4.31 (*)     Hemoglobin 10.2 (*)     Hematocrit 35.4 (*)     MCV 82      MCH 23.7 (*)     MCHC 28.8 (*)     RDW 19.3 (*)     Platelet Count 262      % Neutrophils 87      % Lymphocytes 6      % Monocytes 6      % Eosinophils 1      % Basophils 0      % Immature Granulocytes 1      NRBCs per 100 WBC 0      Absolute Neutrophils 10.4 (*)     Absolute Lymphocytes 0.7 (*)     Absolute Monocytes 0.7      Absolute Eosinophils 0.1      Absolute Basophils 0.0      Absolute Immature Granulocytes 0.1      Absolute NRBCs 0.0     LACTIC ACID WHOLE BLOOD - Abnormal    Lactic Acid 2.2 (*)    LIPASE - Normal    Lipase 21     INFLUENZA A/B, RSV AND SARS-COV2 PCR   BLOOD CULTURE   BLOOD CULTURE       Imaging   CT Chest/Abdomen/Pelvis w Contrast   Final Result   IMPRESSION:   1.  New bilateral lower lobe pneumonia.   2.  Large volume of fecal material distending the rectum and distal sigmoid colon. No evidence of bowel obstruction or perforation.   3.  Chronic changes as discussed above.             Independent Interpretation   None    ED  Course      Medications Administered   Medications - No data to display    Procedures   Procedures     Discussion of Management   Admitting Hospitalist, Dr. Rivera    ED Course   ED Course as of 04/16/25 0616   Wed Apr 16, 2025   1834 I obtained history and examined the patient as noted above.    2307 I spoke with the on-call Hospice RN Selena.  I made her aware that the patient wants to be admitted to the hospital for treatment and that his daughter agrees and she is his POA.  She states that it is fine for him to be admitted to the hospital and be treated for the fecal impaction and constipation along with antibiotics for the pneumonia.  She states he does not need to be disenrolled from hospice since he came to the emergency department due to abdominal pain and constipation.       Additional Documentation  None    Medical Decision Making / Diagnosis     CMS Diagnoses: CMS Diagnoses: The patient has signs of severe sepsis   Sepsis ED evaluation   The patient has signs of severe sepsis as evidenced by:  1. Presence of 2 SIRS criteria, suspected infection, AND  2. Organ dysfunction: Lactic Acidosis with value >2.0 due to sepsis    Sepsis Care Initiation: Starting at  18:42 PM on 04/17/25, until specified. Prior to this documentation, sepsis, severe sepsis, or septic shock was NOT thought to be a significant cause of illness. This order represents the first time infection was seriously considered to be affecting the patient.    Lactic Acid Results:  Recent Labs   Lab Test 04/16/25 2128 04/16/25 1842 01/29/25  0952   LACT 2.2* 2.7* 1.9       3 Hour Bundle 6 Hour Bundle (Reassessment)   Blood Cultures before IV Antibiotics: Yes  Antibiotics given: see below  Prehospital fluid volume (mL):                     Total fluids given (ED +Pre-hospital):  The patient responded to a lesser volume of IV fluids. The initial volume ordered was 1000 mL.    Repeat Lactic Acid Level: Ordered by reflex for 2 hours after initial lactic  acid collection.  Vasopressors: MAP>65 after initial IVF bolus, will continue to monitor fluid status and vital signs.  Repeat perfusion exam: I attest to having performed a repeat sepsis exam and assessment of perfusion at  21:28 PM.   BMI Readings from Last 1 Encounters:   04/16/25 28.75 kg/m        Anti-infectives (From admission through now)      Start     Dose/Rate Route Frequency Ordered Stop    04/16/25 2300  azithromycin (ZITHROMAX) 500 mg vial to attach to  mL bag         500 mg  over 1 Hours Intravenous ONCE 04/16/25 2256      04/16/25 2045  piperacillin-tazobactam (ZOSYN) 4.5 g vial to attach to  mL bag         4.5 g  over 30 Minutes Intravenous EVERY 6 HOURS 04/16/25 2044                       MIPS       None    MDM   Ron Gilmore is a 84 year old male who arrives to the emergency department due to abdominal pain.  I found the patient to have fecal impaction of stool in the rectum and sigmoid colon which I feel is the cause of his abdominal pain.  There was no sign of bowel obstruction or bowel perforation or abscess.  I attempted to perform manual fecal impaction which was too painful so pink lady enema was ordered.  I also found him to have acute bilateral pneumonia which is likely due to aspiration pneumonia so Zosyn and azithromycin antibiotics were given after blood cultures x 2 were obtained.  I spoke with the patient and his daughter who is his POA and the patient request to be admitted to the hospital and his daughter supports this decision.  I spoke with the hospice nurse who agrees.  She does not think the hospice needs to be revoked since she spoke with the hospice doctor who states that the hospice does not need to be revoked since patient was sent to the ER due to his abdominal pain and constipation.    Disposition   The patient was admitted to the hospital.     Diagnosis     ICD-10-CM    1. Pneumonia of both lower lobes due to infectious organism  J18.9     likely Aspiration  pneumonia      2. Fecal impaction in rectum (H)  K56.41       3. Constipation, unspecified constipation type  K59.00            Discharge Medications   New Prescriptions    No medications on file         Scribe Disclosure:  I, Shadia Salinas, am serving as a scribe at 6:39 PM on 4/16/2025 to document services personally performed by Maria Elena Bailey MD based on my observations and the provider's statements to me.        Maria Elena Bailey MD  04/17/25 0017

## 2025-04-16 NOTE — ED TRIAGE NOTES
Arrives via EMS from the [facility] where pt is on Hospice. Pt. Has abdominal pain, no BM for 7 days. The pain comes and goes. Pt. Is alert and oriented x 3. Tells EMS he has had this pain since July 2. Pt comes with paperwork, POLST.      Triage Assessment (Adult)       Row Name 04/16/25 6179          Triage Assessment    Airway WDL WDL        Respiratory WDL    Respiratory WDL X  chronic 02 2lpm        Skin Circulation/Temperature WDL    Skin Circulation/Temperature WDL WDL        Cardiac WDL    Cardiac WDL WDL        Peripheral/Neurovascular WDL    Peripheral Neurovascular WDL WDL        Cognitive/Neuro/Behavioral WDL    Cognitive/Neuro/Behavioral WDL WDL

## 2025-04-16 NOTE — ED NOTES
Bed: ED09  Expected date:   Expected time:   Means of arrival:   Comments:  Saira 3 84 M bowel obstruction

## 2025-04-17 VITALS
BODY MASS INDEX: 28.4 KG/M2 | TEMPERATURE: 99 F | OXYGEN SATURATION: 98 % | HEART RATE: 82 BPM | SYSTOLIC BLOOD PRESSURE: 111 MMHG | DIASTOLIC BLOOD PRESSURE: 53 MMHG | RESPIRATION RATE: 16 BRPM | HEIGHT: 72 IN | WEIGHT: 209.66 LBS

## 2025-04-17 PROBLEM — L89.156 PRESSURE INJURY OF DEEP TISSUE OF SACRAL REGION: Status: ACTIVE | Noted: 2025-04-17

## 2025-04-17 LAB
FLUAV RNA SPEC QL NAA+PROBE: NEGATIVE
FLUBV RNA RESP QL NAA+PROBE: NEGATIVE
RSV RNA SPEC NAA+PROBE: NEGATIVE
SARS-COV-2 RNA RESP QL NAA+PROBE: NEGATIVE

## 2025-04-17 PROCEDURE — G0463 HOSPITAL OUTPT CLINIC VISIT: HCPCS

## 2025-04-17 PROCEDURE — G0378 HOSPITAL OBSERVATION PER HR: HCPCS

## 2025-04-17 PROCEDURE — 99233 SBSQ HOSP IP/OBS HIGH 50: CPT | Mod: GW | Performed by: INTERNAL MEDICINE

## 2025-04-17 PROCEDURE — 250N000013 HC RX MED GY IP 250 OP 250 PS 637: Performed by: INTERNAL MEDICINE

## 2025-04-17 PROCEDURE — 120N000001 HC R&B MED SURG/OB

## 2025-04-17 PROCEDURE — 96376 TX/PRO/DX INJ SAME DRUG ADON: CPT

## 2025-04-17 PROCEDURE — 250N000013 HC RX MED GY IP 250 OP 250 PS 637: Performed by: EMERGENCY MEDICINE

## 2025-04-17 PROCEDURE — 250N000011 HC RX IP 250 OP 636: Mod: JZ | Performed by: INTERNAL MEDICINE

## 2025-04-17 PROCEDURE — 96366 THER/PROPH/DIAG IV INF ADDON: CPT

## 2025-04-17 PROCEDURE — 250N000011 HC RX IP 250 OP 636: Performed by: INTERNAL MEDICINE

## 2025-04-17 RX ORDER — UMECLIDINIUM BROMIDE AND VILANTEROL TRIFENATATE 62.5; 25 UG/1; UG/1
1 POWDER RESPIRATORY (INHALATION) DAILY
Status: DISCONTINUED | OUTPATIENT
Start: 2025-04-17 | End: 2025-04-18 | Stop reason: HOSPADM

## 2025-04-17 RX ORDER — FUROSEMIDE 20 MG/1
20 TABLET ORAL DAILY
Status: DISCONTINUED | OUTPATIENT
Start: 2025-04-17 | End: 2025-04-18 | Stop reason: HOSPADM

## 2025-04-17 RX ORDER — SORBITOL SOLUTION 70 %
20 SOLUTION, ORAL MISCELLANEOUS 2 TIMES DAILY
COMMUNITY

## 2025-04-17 RX ORDER — DEXTROMETHORPHAN POLISTIREX 30 MG/5ML
60 SUSPENSION ORAL EVERY 4 HOURS PRN
COMMUNITY

## 2025-04-17 RX ORDER — DEXTROMETHORPHAN POLISTIREX 30 MG/5ML
60 SUSPENSION ORAL EVERY 12 HOURS PRN
Status: DISCONTINUED | OUTPATIENT
Start: 2025-04-17 | End: 2025-04-18 | Stop reason: HOSPADM

## 2025-04-17 RX ORDER — PANTOPRAZOLE SODIUM 40 MG/1
40 TABLET, DELAYED RELEASE ORAL 2 TIMES DAILY
Status: DISCONTINUED | OUTPATIENT
Start: 2025-04-17 | End: 2025-04-18 | Stop reason: HOSPADM

## 2025-04-17 RX ORDER — AMOXICILLIN 250 MG
2 CAPSULE ORAL 2 TIMES DAILY PRN
Status: DISCONTINUED | OUTPATIENT
Start: 2025-04-17 | End: 2025-04-18 | Stop reason: HOSPADM

## 2025-04-17 RX ORDER — CARBOXYMETHYLCELLULOSE SODIUM 5 MG/ML
2 SOLUTION/ DROPS OPHTHALMIC 2 TIMES DAILY PRN
Status: DISCONTINUED | OUTPATIENT
Start: 2025-04-17 | End: 2025-04-18 | Stop reason: HOSPADM

## 2025-04-17 RX ORDER — ACETAMINOPHEN 650 MG/1
650 SUPPOSITORY RECTAL EVERY 4 HOURS PRN
Status: DISCONTINUED | OUTPATIENT
Start: 2025-04-17 | End: 2025-04-18 | Stop reason: HOSPADM

## 2025-04-17 RX ORDER — OXYCODONE HYDROCHLORIDE 5 MG/1
5 TABLET ORAL EVERY 4 HOURS PRN
Status: DISCONTINUED | OUTPATIENT
Start: 2025-04-17 | End: 2025-04-18 | Stop reason: HOSPADM

## 2025-04-17 RX ORDER — ALBUTEROL SULFATE 0.83 MG/ML
2.5 SOLUTION RESPIRATORY (INHALATION)
Status: DISCONTINUED | OUTPATIENT
Start: 2025-04-17 | End: 2025-04-18 | Stop reason: HOSPADM

## 2025-04-17 RX ORDER — LIDOCAINE 40 MG/G
CREAM TOPICAL
Status: DISCONTINUED | OUTPATIENT
Start: 2025-04-17 | End: 2025-04-18 | Stop reason: HOSPADM

## 2025-04-17 RX ORDER — SERTRALINE HYDROCHLORIDE 100 MG/1
100 TABLET, FILM COATED ORAL DAILY
Status: DISCONTINUED | OUTPATIENT
Start: 2025-04-17 | End: 2025-04-18 | Stop reason: HOSPADM

## 2025-04-17 RX ORDER — HYDROMORPHONE HCL IN WATER/PF 6 MG/30 ML
0.4 PATIENT CONTROLLED ANALGESIA SYRINGE INTRAVENOUS
Status: DISCONTINUED | OUTPATIENT
Start: 2025-04-17 | End: 2025-04-18 | Stop reason: HOSPADM

## 2025-04-17 RX ORDER — HYOSCYAMINE SULFATE 0.125 MG
0.12 TABLET,DISINTEGRATING ORAL EVERY 4 HOURS PRN
COMMUNITY

## 2025-04-17 RX ORDER — ACETAMINOPHEN 325 MG/1
650 TABLET ORAL EVERY 4 HOURS PRN
Status: DISCONTINUED | OUTPATIENT
Start: 2025-04-17 | End: 2025-04-18 | Stop reason: HOSPADM

## 2025-04-17 RX ORDER — PROCHLORPERAZINE MALEATE 5 MG/1
5 TABLET ORAL EVERY 6 HOURS PRN
Status: DISCONTINUED | OUTPATIENT
Start: 2025-04-17 | End: 2025-04-18 | Stop reason: HOSPADM

## 2025-04-17 RX ORDER — ALLOPURINOL 300 MG/1
300 TABLET ORAL EVERY MORNING
Status: DISCONTINUED | OUTPATIENT
Start: 2025-04-17 | End: 2025-04-18 | Stop reason: HOSPADM

## 2025-04-17 RX ORDER — HYOSCYAMINE SULFATE 0.12 MG/1
125 TABLET ORAL EVERY 4 HOURS PRN
Status: DISCONTINUED | OUTPATIENT
Start: 2025-04-17 | End: 2025-04-18 | Stop reason: HOSPADM

## 2025-04-17 RX ORDER — METHENAMINE HIPPURATE 1000 MG/1
1 TABLET ORAL 2 TIMES DAILY
Status: DISCONTINUED | OUTPATIENT
Start: 2025-04-17 | End: 2025-04-18 | Stop reason: HOSPADM

## 2025-04-17 RX ORDER — SIMETHICONE 80 MG
80 TABLET,CHEWABLE ORAL 3 TIMES DAILY PRN
Status: DISCONTINUED | OUTPATIENT
Start: 2025-04-17 | End: 2025-04-18 | Stop reason: HOSPADM

## 2025-04-17 RX ORDER — SORBITOL SOLUTION 70 %
20 SOLUTION, ORAL MISCELLANEOUS 2 TIMES DAILY
Status: DISCONTINUED | OUTPATIENT
Start: 2025-04-17 | End: 2025-04-17

## 2025-04-17 RX ORDER — NALOXONE HYDROCHLORIDE 0.4 MG/ML
0.2 INJECTION, SOLUTION INTRAMUSCULAR; INTRAVENOUS; SUBCUTANEOUS
Status: DISCONTINUED | OUTPATIENT
Start: 2025-04-17 | End: 2025-04-18 | Stop reason: HOSPADM

## 2025-04-17 RX ORDER — POLYETHYLENE GLYCOL 3350 17 G/17G
17 POWDER, FOR SOLUTION ORAL 2 TIMES DAILY PRN
Status: DISCONTINUED | OUTPATIENT
Start: 2025-04-17 | End: 2025-04-18 | Stop reason: HOSPADM

## 2025-04-17 RX ORDER — ALBUTEROL SULFATE 0.83 MG/ML
2.5 SOLUTION RESPIRATORY (INHALATION) EVERY 4 HOURS PRN
COMMUNITY
Start: 2025-03-23

## 2025-04-17 RX ORDER — CARBOXYMETHYLCELLULOSE SODIUM 5 MG/ML
2 SOLUTION/ DROPS OPHTHALMIC 2 TIMES DAILY
Status: DISCONTINUED | OUTPATIENT
Start: 2025-04-17 | End: 2025-04-18 | Stop reason: HOSPADM

## 2025-04-17 RX ORDER — BISACODYL 10 MG
10 SUPPOSITORY, RECTAL RECTAL DAILY
Status: DISCONTINUED | OUTPATIENT
Start: 2025-04-17 | End: 2025-04-18 | Stop reason: HOSPADM

## 2025-04-17 RX ORDER — SORBITOL SOLUTION 70 %
30 SOLUTION, ORAL MISCELLANEOUS 2 TIMES DAILY
Status: DISCONTINUED | OUTPATIENT
Start: 2025-04-17 | End: 2025-04-18 | Stop reason: HOSPADM

## 2025-04-17 RX ORDER — ONDANSETRON 2 MG/ML
4 INJECTION INTRAMUSCULAR; INTRAVENOUS EVERY 6 HOURS PRN
Status: DISCONTINUED | OUTPATIENT
Start: 2025-04-17 | End: 2025-04-18 | Stop reason: HOSPADM

## 2025-04-17 RX ORDER — NALOXONE HYDROCHLORIDE 0.4 MG/ML
0.4 INJECTION, SOLUTION INTRAMUSCULAR; INTRAVENOUS; SUBCUTANEOUS
Status: DISCONTINUED | OUTPATIENT
Start: 2025-04-17 | End: 2025-04-18 | Stop reason: HOSPADM

## 2025-04-17 RX ORDER — BISACODYL 5 MG/1
10 TABLET, DELAYED RELEASE ORAL DAILY
COMMUNITY

## 2025-04-17 RX ORDER — ACETAMINOPHEN 325 MG/1
650 TABLET ORAL EVERY 4 HOURS PRN
Status: DISCONTINUED | OUTPATIENT
Start: 2025-04-17 | End: 2025-04-17

## 2025-04-17 RX ORDER — QUETIAPINE FUMARATE 25 MG/1
12.5 TABLET, FILM COATED ORAL EVERY 4 HOURS PRN
COMMUNITY

## 2025-04-17 RX ORDER — AZITHROMYCIN 250 MG/1
250 TABLET, FILM COATED ORAL DAILY
Status: DISCONTINUED | OUTPATIENT
Start: 2025-04-17 | End: 2025-04-18 | Stop reason: HOSPADM

## 2025-04-17 RX ORDER — BISACODYL 5 MG/1
10 TABLET, DELAYED RELEASE ORAL DAILY
Status: DISCONTINUED | OUTPATIENT
Start: 2025-04-17 | End: 2025-04-17

## 2025-04-17 RX ORDER — AMOXICILLIN 250 MG
2 CAPSULE ORAL 2 TIMES DAILY
Status: DISCONTINUED | OUTPATIENT
Start: 2025-04-17 | End: 2025-04-18 | Stop reason: HOSPADM

## 2025-04-17 RX ORDER — MORPHINE SULFATE 10 MG/5ML
5 SOLUTION ORAL
Status: DISCONTINUED | OUTPATIENT
Start: 2025-04-17 | End: 2025-04-18 | Stop reason: HOSPADM

## 2025-04-17 RX ORDER — HYDROMORPHONE HCL IN WATER/PF 6 MG/30 ML
0.2 PATIENT CONTROLLED ANALGESIA SYRINGE INTRAVENOUS
Status: DISCONTINUED | OUTPATIENT
Start: 2025-04-17 | End: 2025-04-18 | Stop reason: HOSPADM

## 2025-04-17 RX ORDER — AMOXICILLIN 250 MG
1 CAPSULE ORAL 2 TIMES DAILY PRN
Status: DISCONTINUED | OUTPATIENT
Start: 2025-04-17 | End: 2025-04-18 | Stop reason: HOSPADM

## 2025-04-17 RX ORDER — AMOXICILLIN 250 MG
1 CAPSULE ORAL 2 TIMES DAILY
Status: DISCONTINUED | OUTPATIENT
Start: 2025-04-17 | End: 2025-04-18 | Stop reason: HOSPADM

## 2025-04-17 RX ORDER — ONDANSETRON 4 MG/1
4 TABLET, ORALLY DISINTEGRATING ORAL EVERY 6 HOURS PRN
Status: DISCONTINUED | OUTPATIENT
Start: 2025-04-17 | End: 2025-04-18 | Stop reason: HOSPADM

## 2025-04-17 RX ORDER — MORPHINE SULFATE 30 MG/1
5 TABLET ORAL
Status: ON HOLD | COMMUNITY
End: 2025-04-18

## 2025-04-17 RX ADMIN — NALOXEGOL OXALATE 12.5 MG: 12.5 TABLET, FILM COATED ORAL at 09:45

## 2025-04-17 RX ADMIN — ACETAMINOPHEN 650 MG: 325 TABLET, FILM COATED ORAL at 03:52

## 2025-04-17 RX ADMIN — CARBOXYMETHYLCELLULOSE SODIUM 2 DROP: 5 SOLUTION/ DROPS OPHTHALMIC at 21:42

## 2025-04-17 RX ADMIN — PIPERACILLIN AND TAZOBACTAM 4.5 G: 4; .5 INJECTION, POWDER, FOR SOLUTION INTRAVENOUS at 03:15

## 2025-04-17 RX ADMIN — UMECLIDINIUM BROMIDE AND VILANTEROL TRIFENATATE 1 PUFF: 62.5; 25 POWDER RESPIRATORY (INHALATION) at 09:51

## 2025-04-17 RX ADMIN — AZITHROMYCIN DIHYDRATE 250 MG: 250 TABLET ORAL at 21:42

## 2025-04-17 RX ADMIN — HYDROMORPHONE HYDROCHLORIDE 0.2 MG: 0.2 INJECTION, SOLUTION INTRAMUSCULAR; INTRAVENOUS; SUBCUTANEOUS at 11:15

## 2025-04-17 RX ADMIN — SORBITOL SOLUTION (BULK) 30 ML: 70 SOLUTION at 21:41

## 2025-04-17 RX ADMIN — PANTOPRAZOLE SODIUM 40 MG: 40 TABLET, DELAYED RELEASE ORAL at 21:42

## 2025-04-17 RX ADMIN — PIPERACILLIN AND TAZOBACTAM 4.5 G: 4; .5 INJECTION, POWDER, FOR SOLUTION INTRAVENOUS at 16:12

## 2025-04-17 RX ADMIN — METHENAMINE HIPPURATE 1 G: 1 TABLET ORAL at 21:42

## 2025-04-17 RX ADMIN — PIPERACILLIN AND TAZOBACTAM 4.5 G: 4; .5 INJECTION, POWDER, FOR SOLUTION INTRAVENOUS at 09:47

## 2025-04-17 RX ADMIN — METOPROLOL TARTRATE 12.5 MG: 25 TABLET, FILM COATED ORAL at 21:42

## 2025-04-17 RX ADMIN — SORBITOL SOLUTION (BULK) 30 ML: 70 SOLUTION at 09:52

## 2025-04-17 RX ADMIN — ALLOPURINOL 300 MG: 300 TABLET ORAL at 10:06

## 2025-04-17 RX ADMIN — METOPROLOL TARTRATE 12.5 MG: 25 TABLET, FILM COATED ORAL at 09:46

## 2025-04-17 RX ADMIN — FUROSEMIDE 20 MG: 20 TABLET ORAL at 16:17

## 2025-04-17 RX ADMIN — QUETIAPINE 12.5 MG: 25 TABLET, FILM COATED ORAL at 21:42

## 2025-04-17 RX ADMIN — PIPERACILLIN AND TAZOBACTAM 4.5 G: 4; .5 INJECTION, POWDER, FOR SOLUTION INTRAVENOUS at 21:41

## 2025-04-17 RX ADMIN — OXYCODONE HYDROCHLORIDE 2.5 MG: 5 TABLET ORAL at 10:05

## 2025-04-17 RX ADMIN — CARBOXYMETHYLCELLULOSE SODIUM 2 DROP: 5 SOLUTION/ DROPS OPHTHALMIC at 09:46

## 2025-04-17 RX ADMIN — SIMETHICONE 226 ML: 125 CAPSULE, LIQUID FILLED ORAL at 00:27

## 2025-04-17 RX ADMIN — SIMETHICONE 226 ML: 125 CAPSULE, LIQUID FILLED ORAL at 10:13

## 2025-04-17 RX ADMIN — PANTOPRAZOLE SODIUM 40 MG: 40 TABLET, DELAYED RELEASE ORAL at 09:46

## 2025-04-17 RX ADMIN — SERTRALINE HYDROCHLORIDE 100 MG: 100 TABLET ORAL at 09:46

## 2025-04-17 RX ADMIN — METHENAMINE HIPPURATE 1 G: 1 TABLET ORAL at 09:46

## 2025-04-17 ASSESSMENT — ACTIVITIES OF DAILY LIVING (ADL)
ADLS_ACUITY_SCORE: 72
ADLS_ACUITY_SCORE: 59
ADLS_ACUITY_SCORE: 65
ADLS_ACUITY_SCORE: 72
ADLS_ACUITY_SCORE: 71
ADLS_ACUITY_SCORE: 65
ADLS_ACUITY_SCORE: 71
ADLS_ACUITY_SCORE: 65
ADLS_ACUITY_SCORE: 65
ADLS_ACUITY_SCORE: 59
ADLS_ACUITY_SCORE: 65
ADLS_ACUITY_SCORE: 59
ADLS_ACUITY_SCORE: 65
ADLS_ACUITY_SCORE: 71
ADLS_ACUITY_SCORE: 59
ADLS_ACUITY_SCORE: 72
DEPENDENT_IADLS:: CLEANING;COOKING;LAUNDRY;SHOPPING;MEAL PREPARATION;MEDICATION MANAGEMENT;MONEY MANAGEMENT;TRANSPORTATION;INCONTINENCE
ADLS_ACUITY_SCORE: 65
ADLS_ACUITY_SCORE: 72
ADLS_ACUITY_SCORE: 65
ADLS_ACUITY_SCORE: 65

## 2025-04-17 NOTE — ED NOTES
[facility]  ED Nurse Handoff Report    ED Chief complaint: Abdominal Pain      ED Diagnosis:   Final diagnoses:   None       Code Status: DNR / DNI    Allergies: No Known Allergies    Patient Story:   Pt comes in from hospice with abdominal pain. Pt has not had a BM for 7 days. Staff sent here for eval and possible bowel obstruction. Pt comes with papers and POLST order.     Focused Assessment:    Neuro: Alert, oriented x 3  Respiratory:Clear lung sounds, on room air   Cardiology:  ST   Gastrointestinal: soft, tender,  distended   Genitourinary/Renal: Cardona in place, changed 3 days ago per pt  Musculoskeletal: moves all extremities   Skin: Intact skin   Lines: PIV    Labs Ordered and Resulted from Time of ED Arrival to Time of ED Departure   COMPREHENSIVE METABOLIC PANEL - Abnormal       Result Value    Sodium 136      Potassium 3.8      Carbon Dioxide (CO2) 32 (*)     Anion Gap 9      Urea Nitrogen 20.1      Creatinine 0.82      GFR Estimate 88      Calcium 8.8      Chloride 95 (*)     Glucose 223 (*)     Alkaline Phosphatase 93      AST 26      ALT 22      Protein Total 7.6      Albumin 2.9 (*)     Bilirubin Total 0.4     LACTIC ACID WHOLE BLOOD WITH 1X REPEAT IN 2 HR WHEN >2 - Abnormal    Lactic Acid, Initial 2.7 (*)    CBC WITH PLATELETS AND DIFFERENTIAL - Abnormal    WBC Count 11.9 (*)     RBC Count 4.31 (*)     Hemoglobin 10.2 (*)     Hematocrit 35.4 (*)     MCV 82      MCH 23.7 (*)     MCHC 28.8 (*)     RDW 19.3 (*)     Platelet Count 262      % Neutrophils 87      % Lymphocytes 6      % Monocytes 6      % Eosinophils 1      % Basophils 0      % Immature Granulocytes 1      NRBCs per 100 WBC 0      Absolute Neutrophils 10.4 (*)     Absolute Lymphocytes 0.7 (*)     Absolute Monocytes 0.7      Absolute Eosinophils 0.1      Absolute Basophils 0.0      Absolute Immature Granulocytes 0.1      Absolute NRBCs 0.0     LACTIC ACID WHOLE BLOOD - Abnormal    Lactic Acid 2.2 (*)    LIPASE - Normal     Lipase 21     BLOOD CULTURE   BLOOD CULTURE        CT Chest/Abdomen/Pelvis w Contrast   Final Result   IMPRESSION:   1.  New bilateral lower lobe pneumonia.   2.  Large volume of fecal material distending the rectum and distal sigmoid colon. No evidence of bowel obstruction or perforation.   3.  Chronic changes as discussed above.               Treatments and/or interventions provided:      Medications   HYDROmorphone (PF) (DILAUDID) injection 0.5 mg (0.5 mg Intravenous $Given 4/16/25 1920)   piperacillin-tazobactam (ZOSYN) 4.5 g vial to attach to  mL bag (0 g Intravenous Stopped 4/16/25 2207)   sodium chloride 0.9% BOLUS 500 mL (has no administration in time range)   sodium chloride 0.9% BOLUS 1,000 mL (0 mLs Intravenous Stopped 4/16/25 2127)   iopamidol (ISOVUE-370) solution 106 mL (106 mLs Intravenous $Given 4/16/25 2133)   sodium chloride 0.9 % bag for CT scan flush (72 mLs Intravenous $Given 4/16/25 2132)        Patient's response to treatments and/or interventions:   Resting comfortably    To be done/followed up on inpatient unit:    See any in-patient orders    Does this patient have any cognitive concerns?:  None    Activity level - Baseline/Home:    Wheelchair    Activity Level - Current:     Total Care    Patient's Preferred language: English     Needed?: No    Isolation: None and Contact   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   Behavioral interventions performed were     ED NURSE PHONE NUMBER: [phone]

## 2025-04-17 NOTE — PROGRESS NOTES
[facility]    Medicine Progress Note - Hospitalist Service    Date of Admission:  4/16/2025    Assessment & Plan      Ron Gilmore is a 84 year old male with a history of stroke, chronic left-sided hemiparesis, urinary retention with chronic indwelling Cardona catheter, obstructive sleep apnea, major depression, recurrent aspiration pneumonitis, chronic respiratory failure with hypoxia admitted on 4/16/2025. He presented to the emergency department for evaluation of abdominal pain and is found to have large volume rectal stool burden with minimal success following disimpaction.  Also found to have bilateral pneumonia on CT somewhat similar to prior CT imaging in the setting of chronic aspiration and recurrent aspiration pneumonitis.    Bilateral pneumonia secondary to chronic aspiration: No measured fevers, but mild leukocytosis at 11.9, lactic acid elevated at 2.7 and subsequently 2.2.  Chronic respiratory failure with hypoxia: Baseline on 2 L nasal cannula oxygen.  No hypoxia increasing hypoxia from his baseline, but rattling breath sounds throughout lung fields.  Some of these are transmitted upper airway sounds with his chronic aspiration.  - apparently he was initiated on anti-infectives yesterday by his hospice team  - Sputum culture if able to obtain  - IV zosyn q6h; azithromycin.  Zosyn was initiated in the emergency department when it was thought he had undifferentiated sepsis, potentially from an abdominal source.   - Discussed with [facility] hospice RN, Francesca Sharma.  Current approach including use of broad-spectrum anti-infectives is not generally consistent with hospice philosophy.  She says she will discuss with their hospice MD    Constipation with rectal stool impaction: Has some chronic issues with constipation, undergoes manual disimpaction through his hospice team.  Was to be on sorbitol twice daily, but apparently this prescription did not go through? (by chart review).   -  Manual disimpaction attempted in the emergency department with minimal effect; uncomfortable for patient  - Sorbitol 30 mL 70% solution twice daily  - Scheduled senna  - Additional as needed bowel regimen  - Enema   - Added Movantik for OIC      COPD with chronic hypoxic respiratory failure:  - Continue DuoNebs 4 times daily  - As needed albuterol nebulizer treatments  - continue PTA anoro ellipta as per LTC facesheet    History of CVA with left-sided hemiplegia:  - Wound consult for buttock wound  - Continue chronic Cardona catheter for bladder outlet obstruction/neurogenic bladder.    - Continue prior to admission Hippurate for hx of CAUTI    Hypertension:  - Continuing PTAmetoprolol tartrate 12.5 mg twice daily    Diet controlled type 2 diabetes: Not on insulin; currently enrolled in hospice.  - Have not ordered blood glucose checks or insulin given comfort cares    Gout:  - Continuing PTA allopurinol    GERD:  - Continue PTA Protonix    Chronic diastolic heart failure:  - Continue PTA metoprolol tartrate 12.5 mg twice daily    Depression:  - Continue prior to admission low-dose Seroquel 12.5 mg at bedtime; consider discontinuation if ongoing issues with constipation.    - Continue prior to admission Zoloft 100 mg daily    Other:   Patient is on hospice through [facility], enrolled at the end of February following hospitalization.  Hospice team was contacted by ER provider and it was felt that he did not need to disenroll from hospice at this time.  See Dr. Bailey's note.  They are aware of antibiotic treatment for pneumonia, and apparently started patient on antibiotic within the past 24 hours  -Treat for comfort.  Have not ordered additional labs or imaging studies.  - I discussed with [facility] hospice RN Francesca Sharma ([phone], Th and F until 5 p.m.).  She says they will discuss with patient's daughter and [facility] will see the patient daily while hospitalized           Observation Goals:  "-diagnostic tests and consults completed and resulted, -vital signs normal or at patient baseline, -tolerating oral intake to maintain hydration, -adequate pain control on oral analgesics, -dyspnea improved and O2 sats greater than 88% on room air or prior home oxygen levels, -safe disposition plan has been identified, Nurse to notify provider when observation goals have been met and patient is ready for discharge.  Diet: Combination Diet Regular Diet; Mechanical/Dental Soft Diet; Liquidized/Moderately Thick (level 3)    DVT Prophylaxis: Pneumatic Compression Devices  Cardona Catheter: PRESENT, indication:    Lines: None     Cardiac Monitoring: None  Code Status: No CPR- Do NOT Intubate      Clinically Significant Risk Factors Present on Admission          # Hypochloremia: Lowest Cl = 95 mmol/L in last 2 days, will monitor as appropriate      # Hypoalbuminemia: Lowest albumin = 2.9 g/dL at 4/16/2025  6:42 PM, will monitor as appropriate   # Drug Induced Platelet Defect: home medication list includes an antiplatelet medication        # Anemia: based on hgb <11       # Overweight: Estimated body mass index is 28.43 kg/m  as calculated from the following:    Height as of this encounter: 1.829 m (6').    Weight as of this encounter: 95.1 kg (209 lb 10.5 oz).       # Financial/Environmental Concerns:    # COPD: noted on problem list        Social Drivers of Health    Tobacco Use: Medium Risk (7/15/2024)    Patient History     Smoking Tobacco Use: Former     Smokeless Tobacco Use: Never          Disposition Plan     Medically Ready for Discharge: Anticipated Tomorrow         Marcie Echeverria MD  Hospitalist Service  [facility]  Securely message with Malia (more info)  Text page via [employer] Paging/Directory   ______________________________________________________________________    Interval History   \"Where is my fan?\"  Patient says he feels hot and clammy.  He says he has diffuse abdominal " "discomfort related to constipation.  He says he has been coughing more lately.  I asked him about treatment with antibiotics, he says, \"you'll have to ask my power of .\"    Physical Exam   Vital Signs: Temp: 99  F (37.2  C) Temp src: Oral BP: 111/67 Pulse: 95   Resp: 16 SpO2: 95 % O2 Device: Nasal cannula Oxygen Delivery: 2 LPM  Weight: 209 lbs 10.52 oz    Constitutional: Chronically ill-appearing man, awake, alert  Respiratory: Much upper airway noise, bilateral rhonchi  Cardiovascular: Regular rate and rhythm  GI: Abdomen is obese, slightly distended, soft, seems diffusely tender without rebound or guarding  Skin/Integumen: No rash on exposed skin.    Other: Mood seems annoyed      Medical Decision Making       50 MINUTES SPENT BY ME on the date of service doing chart review, history, exam, documentation & further activities per the note.  Including discussion with patient, bedside RN, also [facility] hospice RN    Data     I have personally reviewed the following data over the past 24 hrs:    11.9 (H)  \   10.2 (L)   / 262     136 95 (L) 20.1 /  223 (H)   3.8 32 (H) 0.82 \     ALT: 22 AST: 26 AP: 93 TBILI: 0.4   ALB: 2.9 (L) TOT PROTEIN: 7.6 LIPASE: 21     Procal: N/A CRP: N/A Lactic Acid: 2.2 (H)         Imaging results reviewed over the past 24 hrs:   Recent Results (from the past 24 hours)   CT Chest/Abdomen/Pelvis w Contrast    Narrative    EXAM: CT CHEST/ABDOMEN/PELVIS W CONTRAST  LOCATION: [facility]  DATE: 4/16/2025    INDICATION: Abdominal pain, no BM X 1 week and productive cough  check for pneumonia, SBO, Diverticulitis, bowel perforation, pancreatitis, biliary obstruction  COMPARISON: Chest CT December 26, 2024. CT chest, abdomen, pelvis October 11, 2024.  TECHNIQUE: CT scan of the chest, abdomen, and pelvis was performed following injection of IV contrast. Multiplanar reformats were obtained. Dose reduction techniques were used.   CONTRAST: 106 mL Isovue 370    FINDINGS: "   LUNGS AND PLEURA: Emphysematous change with new parenchymal opacification in the lower lobes associated with air bronchograms. There is less extensive opacity along the dependent upper lobes and lingula. Secretions along the right bronchus intermedius.   No pleural effusion.    MEDIASTINUM/AXILLAE: Unchanged mediastinal lymphadenopathy. Right paratracheal node on image 3:44 measures 14 mm short axis. Normal thoracic esophagus.    CORONARY ARTERY CALCIFICATION: Severe.    HEPATOBILIARY: Cholecystectomy. Normal hepatic parenchyma and biliary tree.    PANCREAS: Normal.    SPLEEN: Normal.    ADRENAL GLANDS: Normal.    KIDNEYS/BLADDER: 10 mm left upper pole calyceal calculus. Otherwise normal appearance of the kidneys and ureters. The urinary bladder is decompressed by a Cardona catheter.    BOWEL: The rectum and distal sigmoid colon are distended with a large volume of fecal material to 9 x 10 cm diameter over length of about 23 cm. No bowel wall thickening. The stomach and small bowel appear normal. The appendix is not identified. There is   no inflammation around the cecum.    LYMPH NODES: Normal.    VASCULATURE: Atheromatous vascular calcification with normal caliber abdominal aorta and normal enhancement of the mesenteric vessels. Inferior vena cava filter within the normally enhancing IVC.    PELVIC ORGANS: Normal.    MUSCULOSKELETAL: Degenerative change at the left hip and lumbar spine. Old right humerus fracture. Rotator cuff muscle atrophy bilaterally suggesting there chronic bilateral full-thickness cuff tears.      Impression    IMPRESSION:  1.  New bilateral lower lobe pneumonia.  2.  Large volume of fecal material distending the rectum and distal sigmoid colon. No evidence of bowel obstruction or perforation.  3.  Chronic changes as discussed above.

## 2025-04-17 NOTE — DISCHARGE INSTRUCTIONS
Wound Care Instructions:  Sacral wound(s): Every other day and  as needed for soilage  1. Cleanse with brandan cleanse & protect and soft white disposable cloths.   2. Apply a Sacral Mepilex dressing.

## 2025-04-17 NOTE — PLAN OF CARE
Goal Outcome Evaluation:      Plan of Care Reviewed With: patient    Shift Summary 1900-0700    Admitting Diagnosis: Fecal impaction in rectum (H) [K56.41]  Constipation, unspecified constipation type [K59.00]  Pneumonia of both lower lobes due to infectious organism [J18.9]     Mental Status: A/Ox3.   Activity/dangle: A2/lift.   Diet: mech dental soft/mod thick.   Pain: 5/10 when resting per pt. Managed with prn tylenol.   Chirinos/Voiding: chirinos in place.   Tele/Restraints/Iso: Iso: Contact precaution for VRE.   02/LDA: 2L NC. PIV: SL.   D/C Date: TBD.     Other Info: 2nd nurse skin check done with SOFIA Gunderson. Dry scabby wound on buttocks, mepi on. Pt shakes his arms and legs intermittently due to pain per pt.  Pt take meds whole with apple sauce. Pt is left sided paralyze due to stroke per pt. Pt unable to give sputum culture this shift.     Temp: 99  F (37.2  C) Temp src: Oral BP: 111/67 Pulse: 95   Resp: 16 SpO2: 95 % O2 Device: Nasal cannula Oxygen Delivery: 2 LPM

## 2025-04-17 NOTE — CONSULTS
"[facility] Nurse Inpatient Assessment     Consulted for: buttocks    Summary: Chronic tissue injury to the bilateral buttocks/sacral spine. Evolving deep tissue pressure injury to the left sacral spine, present on admission.     [facility] nurse follow-up plan: weekly    Patient History (according to provider note(s):      \"84 year old male with a history of stroke, chronic left-sided hemiparesis, urinary retention with chronic indwelling Cardona catheter, obstructive sleep apnea, major depression, recurrent aspiration pneumonitis, chronic respiratory failure with hypoxia admitted on 4/16/2025. He presented to the emergency department for evaluation of abdominal pain and is found to have large volume rectal stool burden with minimal success following disimpaction.  Also found to have bilateral pneumonia on CT somewhat similar to prior CT imaging in the setting of chronic aspiration and recurrent aspiration pneumonitis.\"    Assessment:      Areas visualized during today's visit: Focused: and Sacrum/coccyx    Pressure Injury Location: sacral spine    Last photo: 4/17/25    Wound type: Pressure Injury, friction, chronic tissue damage     Pressure Injury Stage: Deep Tissue Pressure Injury (DTPI), evolving, present on admission   Wound history/plan of care:   Sacral mepilex in place. Patient with chronic tissue damage to the bilateral buttocks. Hyperpigmented, dry skin over bilateral buttocks/sacral spine. Left sacral spine with areas of nonblanchable maroon tissue, partial-thickness wounds. Recommending 5 layer silicone adhesive foam dressing (sacral mepilex) for improved pressure redistribution as well as following PIP orders.     Wound base: 10 % maroon, non-blanchable skin, 90 % red, moist     Palpation of the wound bed: normal      Drainage: scant     Description of drainage: serous     Measurements (length x width x depth, in cm) 3.8  x 2  x  0.1 cm      Tunneling N/A     Undermining " "N/A  Periwound skin:  fragile,  dry      Color: hyperpigmented, blanchable erythema       Temperature: normal   Odor: none  Pain: denies , none  Pain intervention prior to dressing change: patient tolerated well, no significant pain present , and slow and gentle cares   Treatment goal: Heal  and Protection  STATUS: initial assessment and evolving  Supplies ordered: supplies stored on unit, discussed with RN, and discussed with patient     My PI Risk Assessment     Sensory Perception: 2 - Very Limited     Moisture: 3 - Occasionally moist      Activity: 1 - Bedfast      Mobility: 1 - Completely immobile      Nutrition: 2 - Probably inadequate      Friction/Shear: 1 - Problem     TOTAL: 10    Treatment Plan:     Sacral wound(s): Every other day and prn for soilage  Cleanse with brandan cleanse & protect and soft white disposable cloths.   Apply a Sacral Mepilex dressing. Follow PIP orders.      Pressure Injury Prevention (PIP) Plan:  -If patient is declining pressure injury prevention interventions: Explore reason why and address patient's concerns, Educate on pressure injury risk and prevention intervention(s), If patient is still declining, document \"informed refusal\" , and Ensure Care team is aware ( provider, charge nurse, etc)  -Mattress: Add NOÉ pump to mattress PRN moisture issues  -HOB: Maintain at or below 30 degrees, unless contraindicated  -Repositioning in bed: Every 1-2 hours , Left/right positioning; avoid supine, Raise foot of bed prior to raising head of bed, to reduce patient sliding down (shear), and Frequent microturns using TAPS wedges, as patient tolerates  -Heels: Keep elevated off mattress and Pillows under calves  -Protective Dressing: Sacral Mepilex for prevention (#[id number]),  especially for the agitated patient   -Positioning Equipment: TAPS wedges (#[id number]) to help maintain 30 degree side lying position   -Chair positioning: Chair cushion (#[id number]) , Assist patient to reposition hourly, and Do " "NOT use a donut for sitting (this increases pressure to smaller area and creates a higher potential for injury)    -Moisture Management: Perineal cleansing /protection: Follow Incontinence Protocol, Avoid brief in bed, Clean and dry skin folds with bathing , Consider InterDry (#[id number]) between folds, and Moisturize dry skin  -Under Devices: Inspect skin under all medical devices during skin inspection , Ensure tubes are stabilized without tension, and Ensure patient is not lying on medical devices or equipment when repositioned      Orders: Written    RECOMMEND PRIMARY TEAM ORDER: None, at this time  Education provided: importance of repositioning, plan of care, wound progress, Moisture management, and Off-loading pressure  Discussed plan of care with: Patient and Nurse  Notify [facility] if wound(s) deteriorate.  Nursing to notify the Provider(s) and re-consult the [facility] Nurse if new skin concern.    DATA:     Current support surface: Standard  Standard gel mattress (Isoflex)  Containment of urine/stool: Incontinence Protocol and Indwelling catheter  BMI: Body mass index is 28.43 kg/m .   Active diet order: Orders Placed This Encounter      Combination Diet Regular Diet; Mechanical/Dental Soft Diet; Liquidized/Moderately Thick (level 3)     Output: No intake/output data recorded.     Labs:   Recent Labs   Lab 04/16/25  1842   ALBUMIN 2.9*   HGB 10.2*   WBC 11.9*     Pressure injury risk assessment:   Sensory Perception: 3-->slightly limited  Moisture: 3-->occasionally moist  Activity: 1-->bedfast  Mobility: 2-->very limited  Nutrition: 2-->probably inadequate  Friction and Shear: 2-->potential problem  Lon Score: 13    Ame Grover RN, CWON  Please contact via [employer] at group \"[facility] nurse\"- M-F [phone]  "

## 2025-04-17 NOTE — PROGRESS NOTES
RECEIVING UNIT ED HANDOFF REVIEW    ED Nurse Handoff Report was reviewed by: Juancarlos Saldana RN on April 17, 2025 at 2:02 AM

## 2025-04-17 NOTE — CONSULTS
Care Management Initial Consult    General Information  Assessment completed with: Patient, Care Team Member, RYLAN-chart review, Janell-nurse at [facility]  Type of CM/SW Visit: Initial Assessment    Primary Care Provider verified and updated as needed: Yes   Readmission within the last 30 days: no previous admission in last 30 days      Reason for Consult: discharge planning, other (see comments) (elevated risk)  Advance Care Planning: Advance Care Planning Reviewed: present on chart, other (see comments) (Hattie to fax updated POLST and HCD)          Communication Assessment  Patient's communication style: spoken language (English or Bilingual)             Cognitive  Cognitive/Neuro/Behavioral: .WDL except, orientation  Level of Consciousness: alert  Arousal Level: opens eyes spontaneously  Orientation: disoriented to, place  Mood/Behavior: calm, cooperative          Living Environment:   People in home: facility resident     Current living Arrangements: assisted living  Name of Facility: [facility]   Able to return to prior arrangements:         Family/Social Support:  Care provided by: other (see comments) (staff, hospice agency)  Provides care for: no one, unable/limited ability to care for self  Marital Status:   Support system: Facility resident(s)/Staff, Other (specify) (stepdaughter López)          Description of Support System: Supportive, Involved         Current Resources:   Patient receiving home care services: No        Community Resources: County Programs, County Worker, Hospice  Equipment currently used at home: grab bar, toilet, grab bar, tub/shower, hospital bed, lift device, shower chair, wheelchair, power  Supplies currently used at home: Incontinence Supplies, Oxygen Tubing/Supplies, Nebulizer tubing    Employment/Financial:  Employment Status: retired        Financial Concerns: none   Referral to Financial Worker: No       Does the patient's insurance  plan have a 3 day qualifying hospital stay waiver?  No    Lifestyle & Psychosocial Needs:  Social Drivers of Health     Food Insecurity: Low Risk  (12/28/2024)    Food Insecurity     Within the past 12 months, did you worry that your food would run out before you got money to buy more?: No     Within the past 12 months, did the food you bought just not last and you didn t have money to get more?: No   Depression: Not on file   Housing Stability: Low Risk  (12/28/2024)    Housing Stability     Do you have housing? : Yes     Are you worried about losing your housing?: No   Tobacco Use: Medium Risk (7/15/2024)    Patient History     Smoking Tobacco Use: Former     Smokeless Tobacco Use: Never     Passive Exposure: Not on file   Financial Resource Strain: Low Risk  (12/28/2024)    Financial Resource Strain     Within the past 12 months, have you or your family members you live with been unable to get utilities (heat, electricity) when it was really needed?: No   Alcohol Use: Not on file   Transportation Needs: Low Risk  (12/28/2024)    Transportation Needs     Within the past 12 months, has lack of transportation kept you from medical appointments, getting your medicines, non-medical meetings or appointments, work, or from getting things that you need?: No   Physical Activity: Not on file   Interpersonal Safety: Low Risk  (12/27/2024)    Interpersonal Safety     Do you feel physically and emotionally safe where you currently live?: Yes     Within the past 12 months, have you been hit, slapped, kicked or otherwise physically hurt by someone?: No     Within the past 12 months, have you been humiliated or emotionally abused in other ways by your partner or ex-partner?: No   Stress: Not on file   Social Connections: Not on file   Health Literacy: Not on file       Functional Status:  Prior to admission patient needed assistance:   Dependent ADLs:: Bathing, Dressing, Grooming, Incontinence, Positioning, Transfers  Dependent  "IADLs:: Cleaning, Cooking, Laundry, Shopping, Meal Preparation, Medication Management, Money Management, Transportation, Incontinence       Mental Health Status:  Mental Health Status: No Current Concerns       Chemical Dependency Status:  Chemical Dependency Status: No Current Concerns             Values/Beliefs:  Spiritual, Cultural Beliefs, Judaism Practices, Values that affect care: no               Discussed  Partnership in Safe Discharge Planning  document with patient/family: Yes: patient    Additional Information:  Consult for elevated risk score.  Met with pt and introduced self and role.  Reviewed SNYDER and observation status with pt.  Reviewed with patient previous consult and pt confirmed still lives at [facility].  Pt currently enrolled in hospice with [facility] updated SW.  Discussed CC calling [facility] to speak with nursing regarding pt and pt in agreement.    CM team called (facility name) [facility], phone [phone] and Spoke with Hattie, nurse who provided the following information:     In what kind of facility does pt reside?  Facility Type: nursing home (assisted living) - \"home discharge\"    Name of building/unit: [place]-pt recently moved to this building.  New address is: [facility] [address]-this is the address on pt facesheet   Any steps to get in (#)? no                2.   Best number to reach facility nursing? Phone [phone] Ext [phone] for Hattie  Fax:  [phone]         Evening/weekend number? [phone]-main number     3.  What is the preferred return time for the resident?  Before 3pm  Can patient return on weekend? no       Do you know how they typically transport? Returned via stretcher after last hospitalization    4.   Does facility manage medications?   Yes If yes, contracted pharmacy? Name:  [facility]         If new Rx meds at discharge, fill at hospital pharmacy or contracted pharmacy?   contracted pharmacy     5.   What is pt's baseline " "cognition and mobility? Total assist with ADL's/IADL's except pt feeds self         What level of cognition/mobility is required for safe return? Total care and on hospice, able to return at discharge     6.  Is resident enrolled in any \"services?\"  yes  If so, what services:  medication management, meals, lift for transfers, bathing, dressing, grooming, incontinence, housekeeping      (ask about: meals, physical assist with ADLs, med management, housekeeping, and if they have built in or wearable call       buttons)    7.  Are \"skilled home health\" or \"on-site outpatient therapy services\" available there? No  name/agency, if        known: Pt is enrolled with [facility]    8.   Is the resident seen by PCP: on-site   Name: Fady    9.   Does pt have any personal DME (describe)? Yes          (ex; grab bars, shower chair, O2, glucometer, etc.) hospital bed, lift, oxygen, nebulizer, power wheelchair    Hattie to fax pt HCD and POLST to CM.    Next Steps: Coordinate return to [facility] when medically ready.  Update [facility] when pt discharging back to [facility]    Kate Cardoza RN, BS  Care Coordinator  [email or web address]      "

## 2025-04-17 NOTE — H&P
[facility]    History and Physical - Hospitalist Service       Date of Admission:  4/16/2025    Assessment & Plan      Ron Gilmore is a 84 year old male with a history of stroke, chronic left-sided hemiparesis, urinary retention with chronic indwelling Cardona catheter, obstructive sleep apnea, major depression, recurrent aspiration pneumonitis, chronic respiratory failure with hypoxia admitted on 4/16/2025. He presented to the emergency department for evaluation of abdominal pain and is found to have large volume rectal stool burden with minimal success following disimpaction.  Also found to have bilateral pneumonia on CT somewhat similar to prior CT imaging in the setting of chronic aspiration and recurrent aspiration pneumonitis.    Bilateral pneumonia secondary to chronic aspiration: No measured fevers, but mild leukocytosis at 11.9, lactic acid elevated at 2.7 and subsequently 2.2.  Chronic respiratory failure with hypoxia: Baseline on 2 L nasal cannula oxygen.  No hypoxia increasing hypoxia from his baseline, but rattling breath sounds throughout lung fields.  Some of these are transmitted upper airway sounds with his chronic aspiration.  - Await pharmacy reconciliation of prior to admission medications; apparently he was initiated on anti-infectives yesterday by his hospice team  - Sputum culture if able to obtain  - IV zosyn q6h; azithromycin.  Zosyn was initiated in the emergency department when it was thought he had undifferentiated sepsis, potentially from an abdominal source.    Constipation with rectal stool impaction: Has some chronic issues with constipation, undergoes manual disimpaction through his hospice team.  Was to be on sorbitol twice daily, but apparently this prescription did not go through? (by chart review).   - Manual disimpaction attempted in the emergency department with minimal effect; uncomfortable for patient  - Sorbitol 30 mL 70% solution twice daily  -  Scheduled senna  - Additional as needed bowel regimen  - Enema to be administered on admission in the emergency department, repeat enema in a.m.  May require additional disimpaction or GI consultation for endoscopic disimpaction if no response    COPD with chronic hypoxic respiratory failure:  - Continue DuoNebs 4 times daily  - As needed albuterol nebulizer treatments  - continue PTA anoro ellipta as per LT facesheet    History of CVA with left-sided hemiplegia:  - Wound consult for buttock wound  - Continue chronic Cardona catheter for bladder outlet obstruction/neurogenic bladder.    - Continue prior to admission Hippurate for hx of CAUTI    Hypertension:  - Continuing prior to admission metoprolol tartrate 12.5 mg twice daily    Diet controlled type 2 diabetes: Not on insulin; currently enrolled in hospice.  - Have not ordered blood glucose checks or insulin given comfort cares    Gout:  - Continuing prior to admission allopurinol    GERD:  - Continue prior to admission Protonix    Chronic diastolic heart failure:  - Continue prior to admission metoprolol tartrate 12.5 mg twice daily  - Await pharmacy reconciliation of prior to admission medications before reinitiating furosemide given his elevated lactic acid and fluid administration    Depression:  - Continue prior to admission low-dose Seroquel 12.5 mg at bedtime; consider discontinuation if ongoing issues with constipation.  I am not certain the degree to which this medication is needed for patient's symptoms.  - Continue prior to admission Zoloft 100 mg daily    Other:   Patient is on hospice through [facility] hospice, enrolled at the end of February following hospitalization.  Hospice team was contacted by ER provider and it was felt that he did not need to disenroll from hospice at this time.  See Dr. Bailey's note.  They are aware of antibiotic treatment for pneumonia, and apparently started patient on antibiotic within the past 24 hours  -Treat for  comfort.  Have not ordered additional labs or imaging studies.  - May be worth contacting patient's hospice team for update as they likely do not have privileges here at [facility].  Hospice contact information is available on patient's facesheet              Diet:  Soft diet with thickened liquids  DVT Prophylaxis: Pneumatic Compression Devices  Cardona Catheter: Not present  Lines: None     Cardiac Monitoring: None  Code Status:   DNR/DNI    Clinically Significant Risk Factors Present on Admission          # Hypochloremia: Lowest Cl = 95 mmol/L in last 2 days, will monitor as appropriate      # Hypoalbuminemia: Lowest albumin = 2.9 g/dL at 4/16/2025  6:42 PM, will monitor as appropriate   # Drug Induced Platelet Defect: home medication list includes an antiplatelet medication        # Anemia: based on hgb <11       # Overweight: Estimated body mass index is 28.75 kg/m  as calculated from the following:    Height as of this encounter: 1.829 m (6').    Weight as of this encounter: 96.2 kg (212 lb).       # Financial/Environmental Concerns:    # COPD: noted on problem list        Disposition Plan     Medically Ready for Discharge: Anticipated Tomorrow following ability to pass stool and improvement in abdominal pain           Deric Rivera MD  Hospitalist Service  [facility]  Securely message with [employer] (more info)  Text page via [employer] Paging/Directory     ______________________________________________________________________    Chief Complaint   Abdominal pain    History is obtained from the patient, chart review, discussion with Dr. Bailey in the emergency department.    History of Present Illness   Ron Gilmore is a 84 year old male who has a history of stroke with left-sided hemiplegia, chronic indwelling Cardona catheter, COPD, diastolic heart failure, recurrent aspiration pneumonia and chronic respiratory failure with 2 L oxygen dependence who presents from his care  facility with complaints of abdominal pain and constipation.    Patient is enrolled in hospice through [facility] as of late February it appears he has had some issues with abdominal discomfort and constipation note manual disimpaction attempts in the preceding days by review of hospice notes.    I see that patient was to be initiated on sorbitol twice daily, but per 4/15/25 note at noon, had not been getting sorbitol with prior order as it was not transcribed.  3 weeks of issues with constipation, going 4 days or more without a bowel movement.    Patient had apparently also recently had a aspiration pneumonia for which she was treated with anti-infectives.  Yesterday's hospice staff apparently initiated anti-infectives again as he was having coughing and choking with drinking fluids; see 4/15 11 AM note where witnessed aspiration was seen.  I am uncertain what anti-infectives were started for pneumonia, but apparently this took place yesterday.    Patient continued to have waves of abdominal pain and plan was made to transport patient to the emergency department for evaluation.  His hospice team was made aware, see chart documentation.  He did not need to disenroll in hospice for this evaluation.    In the emergency department, a CT of his chest, abdomen, and pelvis was performed.  Laboratory studies were sent including CBC with a mild leukocytosis and an elevated lactic acid in the 2 range.  A large amount of stool was noted in his rectal vault and sigmoid colon as well as bilateral pneumonia similar to a CT from a year ago in the setting of known chronic aspiration pneumonitis.    Patient is on his baseline oxygen, has no increased work of breathing or dyspnea when asked.  Does have rattling breath sounds which largely appear to be transmitted upper airway sounds.  Had a low-grade temperature of 99 Fahrenheit.  When I asked about any fever, he tells me that he had a fever 1 year ago.  No recent fever  identified.    Manual disimpaction attempted in the emergency department without effect, uncomfortable for patient.  Enema ordered and observation admission requested.    Discussed with patient plan for enema, bowel regimen, sorbitol.  May require additional manual disimpaction after enema, and if this is unsuccessful, noting prior attempts at disimpaction through his hospice team in the preceding week as well, may need endoscopic disimpaction via GI or colorectal.    He continues to have the urge to defecate, but unable to do so.      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    Unsteady gait     Urinary retention with incomplete bladder emptying     UTI (urinary tract infection)     Vasovagal episode        Past Surgical History   Past Surgical History:   Procedure Laterality Date    APPENDECTOMY OPEN      ARTHROSCOPY SHOULDER ROTATOR CUFF REPAIR      cataracts Bilateral     CHOLECYSTECTOMY      COLONOSCOPY  1986    COLONOSCOPY N/A 5/29/2021    Procedure: COLONOSCOPY;  Surgeon: Kofi Davis MD;  Location:  GI    CYSTOSCOPY  10/19/2011    Procedure:CYSTOSCOPY; CYSTOSCOPY, BLADDER STONE REMOVAL; Surgeon:IRVIN SAWYER; Location: OR    CYSTOSCOPY, TRANSURETHRAL RESECTION (TUR) PROSTATE, COMBINED N/A 2/21/2018    Procedure: COMBINED CYSTOSCOPY, TRANSURETHRAL RESECTION (TUR) PROSTATE;  COMBINED CYSTOSCOPY, TRANSURETHRAL RESECTION (TUR) PROSTATE ;   Surgeon: Rob Sullivan MD;  Location:  OR    EP LOOP RECORDER IMPLANT N/A 1/20/2020    Procedure: EP Loop Recorder Implant;  Surgeon: Evgeny Parisi MD;  Location:  HEART CARDIAC CATH LAB    ESOPHAGOSCOPY, GASTROSCOPY, DUODENOSCOPY (EGD), COMBINED N/A 5/28/2021    Procedure: ESOPHAGOGASTRODUODENOSCOPY (EGD);  Surgeon: Aurora Waterman MD;  Location:  GI    ESOPHAGOSCOPY, GASTROSCOPY, DUODENOSCOPY (EGD), DILATATION, COMBINED N/A 9/24/2022    Procedure: ESOPHAGOGASTRODUODENOSCOPY, WITH DILATION;  Surgeon: Kofi Davis MD;  Location:  GI    EYE SURGERY      right lid surgery     IR IVC FILTER PLACEMENT  5/24/2021    IR NEPHROSTOMY TUBE PLACEMENT RIGHT  3/9/2021    IR URETERAL STENT PLACEMENT RIGHT  3/16/2021    JOINT REPLACEMENT Right     HIP    KNEE SURGERY Bilateral     LAMINECTOMY LUMBAR ONE LEVEL      LASER HOLMIUM LITHOTRIPSY URETER(S), INSERT STENT, COMBINED Right 4/14/2021    Procedure: CYSTOSCOPY, BLADDER STONE REMOVAL, RIGHT URETEROSCOPY, HOLMIUM LASER LITHOTRIPSY, AND RIGHT STENT REMOVAL, RIGHT RETROGRADE;  Surgeon: Rob Sullivan MD;  Location:  OR    TONSILLECTOMY         Prior to Admission Medications   Prior to Admission Medications   Prescriptions Last Dose Informant Patient Reported? Taking?   QUEtiapine (SEROQUEL) 25 MG tablet   No No   Sig: Take 0.5 tablets (12.5 mg) by mouth at bedtime.   Vitamin D3 (VITAMIN D, CHOLECALCIFEROL,) 25 mcg (1000 units) tablet  Nursing Home Yes No   Sig: Take 1 tablet by mouth daily 0800   acetaminophen (TYLENOL) 325 MG tablet  Nursing Home Yes No   Sig: Take 650 mg by mouth every 4 hours as needed for mild pain as needed for pain/fever Max dose 3000mg/24hr   allopurinol (ZYLOPRIM) 300 MG tablet  Nursing Home Yes No   Sig: Take 300 mg by mouth every morning 0900   ammonium lactate (LAC-HYDRIN) 12 % external lotion  Nursing Home Yes No   Sig: Apply topically daily Apply a thin film to bilateral feet and legs   aspirin (ASA) 81 MG EC  tablet  Nursing Home No No   Sig: Take 1 tablet (81 mg) by mouth daily   atorvastatin (LIPITOR) 10 MG tablet  Nursing Home Yes No   Sig: Take 10 mg by mouth every morning 0900   bisacodyl (DULCOLAX) 10 MG suppository  Nursing Home Yes No   Sig: Place 10 mg rectally three times a week. Mon/Wed/Fri   formoterol (PERFOROMIST) 20 MCG/2ML neb solution  Nursing Home Yes No   Sig: Take 20 mcg by nebulization every 12 hours 1000, 2300   furosemide (LASIX) 20 MG tablet  Nursing Home Yes No   Sig: Take 20 mg by mouth daily 0900   hypromellose (ARTIFICIAL TEARS) 0.5 % SOLN ophthalmic solution  Nursing Home Yes No   Sig: Place 2 drops into both eyes 2 times daily 0800, 2000   hypromellose (ARTIFICIAL TEARS) 0.5 % SOLN ophthalmic solution  Nursing Home Yes No   Sig: Place 2 drops into both eyes 2 times daily as needed for dry eyes   ipratropium - albuterol 0.5 mg/2.5 mg/3 mL (DUONEB) 0.5-2.5 (3) MG/3ML neb solution  Nursing Home Yes No   Sig: Take 1 vial by nebulization 3 times daily as needed for shortness of breath, wheezing or cough.   ipratropium-albuterol (COMBIVENT RESPIMAT)  MCG/ACT inhaler  Nursing Home Yes No   Sig: Inhale 1 puff into the lungs 4 times daily 0900, 1200 ,1600 ,2000   loperamide (IMODIUM) 2 MG capsule  Nursing Home Yes No   Sig: Take 2 mg by mouth every 6 hours as needed for diarrhea   melatonin 3 MG tablet  Nursing Home Yes No   Sig: Take 3 mg by mouth at bedtime. May repeat dose if not effective   methenamine hippurate (HIPREX) 1 g tablet  Nursing Home Yes No   Sig: Take 1 g by mouth 2 times daily 0900, 2000   metoprolol tartrate (LOPRESSOR) 25 MG tablet  Nursing Home No No   Sig: Take 0.5 tablets (12.5 mg) by mouth 2 times daily   pantoprazole (PROTONIX) 40 MG EC tablet  Nursing Home Yes No   Sig: Take 40 mg by mouth 2 times daily 0900, 2000   senna-docusate (SENOKOT-S/PERICOLACE) 8.6-50 MG tablet  Nursing Home Yes No   Sig: Take 2 tablets by mouth 2 times daily. 0900, 2000   senna-docusate  (SENOKOT-S/PERICOLACE) 8.6-50 MG tablet  Nursing Home Yes No   Sig: Take 1 tablet by mouth daily as needed for constipation   sertraline (ZOLOFT) 50 MG tablet  Nursing Home Yes No   Sig: Take 75 mg by mouth daily. 0900   simethicone (MYLICON) 125 MG chewable tablet  Nursing Home Yes No   Sig: Take 125 mg by mouth 3 times daily as needed for intestinal gas      Facility-Administered Medications: None           Physical Exam   Vital Signs: Temp: 99.3  F (37.4  C) Temp src: Oral BP: 119/57 Pulse: 105   Resp: 16 SpO2: 95 % O2 Device: Nasal cannula Oxygen Delivery: 2 LPM  Weight: 212 lbs 0 oz    General Appearance: Obese 84-year-old male resting on [facility].  No acute distress, though occasionally will have waves of discomfort in his abdomen  Eyes: No scleral icterus or injection  Respiratory: Rattling breath sounds throughout lung fields bilaterally.  Largely these appear to be transmitted upper airway sounds  Cardiovascular: Regular rate and rhythm with heart rate in the 80-90s range.  GI: Protuberant, tender to palpation in the lower quadrants.  No palpable mass  Lymph/Hematologic: 1+ edema bilaterally  Genitourinary: Cardona catheter in place draining clear yellow urine  Neurologic: Chronic left hemiparesis.  Alert, conversant, appropriate in conversation.  Psychiatric: Pleasant, normal affect    Medical Decision Making       65 MINUTES SPENT BY ME on the date of service doing chart review, history, exam, documentation & further activities per the note.      Data     I have personally reviewed the following data over the past 24 hrs:    11.9 (H)  \   10.2 (L)   / 262     136 95 (L) 20.1 /  223 (H)   3.8 32 (H) 0.82 \     ALT: 22 AST: 26 AP: 93 TBILI: 0.4   ALB: 2.9 (L) TOT PROTEIN: 7.6 LIPASE: 21     Procal: N/A CRP: N/A Lactic Acid: 2.2 (H)         Imaging results reviewed over the past 24 hrs:   Recent Results (from the past 24 hours)   CT Chest/Abdomen/Pelvis w Contrast    Narrative    EXAM: CT  CHEST/ABDOMEN/PELVIS W CONTRAST  LOCATION: [facility]  DATE: 4/16/2025    INDICATION: Abdominal pain, no BM X 1 week and productive cough  check for pneumonia, SBO, Diverticulitis, bowel perforation, pancreatitis, biliary obstruction  COMPARISON: Chest CT December 26, 2024. CT chest, abdomen, pelvis October 11, 2024.  TECHNIQUE: CT scan of the chest, abdomen, and pelvis was performed following injection of IV contrast. Multiplanar reformats were obtained. Dose reduction techniques were used.   CONTRAST: 106 mL Isovue 370    FINDINGS:   LUNGS AND PLEURA: Emphysematous change with new parenchymal opacification in the lower lobes associated with air bronchograms. There is less extensive opacity along the dependent upper lobes and lingula. Secretions along the right bronchus intermedius.   No pleural effusion.    MEDIASTINUM/AXILLAE: Unchanged mediastinal lymphadenopathy. Right paratracheal node on image 3:44 measures 14 mm short axis. Normal thoracic esophagus.    CORONARY ARTERY CALCIFICATION: Severe.    HEPATOBILIARY: Cholecystectomy. Normal hepatic parenchyma and biliary tree.    PANCREAS: Normal.    SPLEEN: Normal.    ADRENAL GLANDS: Normal.    KIDNEYS/BLADDER: 10 mm left upper pole calyceal calculus. Otherwise normal appearance of the kidneys and ureters. The urinary bladder is decompressed by a Cardona catheter.    BOWEL: The rectum and distal sigmoid colon are distended with a large volume of fecal material to 9 x 10 cm diameter over length of about 23 cm. No bowel wall thickening. The stomach and small bowel appear normal. The appendix is not identified. There is   no inflammation around the cecum.    LYMPH NODES: Normal.    VASCULATURE: Atheromatous vascular calcification with normal caliber abdominal aorta and normal enhancement of the mesenteric vessels. Inferior vena cava filter within the normally enhancing IVC.    PELVIC ORGANS: Normal.    MUSCULOSKELETAL: Degenerative change at  the left hip and lumbar spine. Old right humerus fracture. Rotator cuff muscle atrophy bilaterally suggesting there chronic bilateral full-thickness cuff tears.      Impression    IMPRESSION:  1.  New bilateral lower lobe pneumonia.  2.  Large volume of fecal material distending the rectum and distal sigmoid colon. No evidence of bowel obstruction or perforation.  3.  Chronic changes as discussed above.

## 2025-04-17 NOTE — PHARMACY-ADMISSION MEDICATION HISTORY
Pharmacist Admission Medication History    Admission medication history is complete. The information provided in this note is only as accurate as the sources available at the time of the update.    Information Source(s): Facility ([facility]/) medication list/MAR via N/A    Pertinent Information: Resident [facility]; [phone]     Changes made to PTA medication list:  Added: Albuterol neb, Bisacodyl oral tab, hycosamine, morphine, PRN quetiapine  Deleted: Ammonium lactate, aspirin, atorvastatin, formoterol, loperamide, simethicone  Changed: senna 2 tab bid --> 4 tab BID. Sertraline 75mg daily --> 10mg dialy    Allergies reviewed with patient and updates made in EHR: unable to assess    Medication History Completed By: Irena Solis RPH 4/17/2025 9:22 AM    PTA Med List   Medication Sig Last Dose/Taking    acetaminophen (TYLENOL) 325 MG tablet Take 650 mg by mouth every 4 hours as needed for mild pain as needed for pain/fever Max dose 3000mg/24hr Taking As Needed    albuterol (PROVENTIL) (2.5 MG/3ML) 0.083% neb solution Inhale 2.5 mg into the lungs every 4 hours as needed. Taking As Needed    allopurinol (ZYLOPRIM) 300 MG tablet Take 300 mg by mouth every morning 0900 Taking    bisacodyl (DULCOLAX) 10 MG suppository Place 10 mg rectally daily as needed for constipation. Mon/Wed/Fri Taking As Needed    bisacodyl (DULCOLAX) 5 MG EC tablet Take 10 mg by mouth daily. Taking    dextromethorphan (DELSYM) 30 MG/5ML liquid Take 60 mg by mouth every 4 hours as needed for cough. Taking As Needed    furosemide (LASIX) 20 MG tablet Take 20 mg by mouth daily 0900 Taking    hyoscyamine 0.125 MG TBDP Take 0.125 mg by mouth every 4 hours as needed for cramping. Taking As Needed    hypromellose (ARTIFICIAL TEARS) 0.5 % SOLN ophthalmic solution Place 2 drops into both eyes 2 times daily 0800, 2000 Taking    hypromellose (ARTIFICIAL TEARS) 0.5 % SOLN ophthalmic solution Place 2 drops into both eyes 2 times daily as needed  for dry eyes Taking As Needed    ipratropium - albuterol 0.5 mg/2.5 mg/3 mL (DUONEB) 0.5-2.5 (3) MG/3ML neb solution Take 1 vial by nebulization 3 times daily as needed for shortness of breath, wheezing or cough. Taking As Needed    ipratropium-albuterol (COMBIVENT RESPIMAT)  MCG/ACT inhaler Inhale 1 puff into the lungs 4 times daily 0900, 1200 ,1600 ,2000 Taking    melatonin 3 MG tablet Take 3 mg by mouth at bedtime. May repeat dose if not effective Taking    methenamine hippurate (HIPREX) 1 g tablet Take 1 g by mouth 2 times daily 0900, 2000 Taking    metoprolol tartrate (LOPRESSOR) 25 MG tablet Take 0.5 tablets (12.5 mg) by mouth 2 times daily Taking    morphine 5 MG solu-tab Place 5 mg under the tongue every hour as needed for shortness of breath or moderate to severe pain. Taking As Needed    pantoprazole (PROTONIX) 40 MG EC tablet Take 40 mg by mouth 2 times daily 0900, 2000 Taking    QUEtiapine (SEROQUEL) 25 MG tablet Take 12.5 mg by mouth every 4 hours as needed. Taking As Needed    QUEtiapine (SEROQUEL) 25 MG tablet Take 0.5 tablets (12.5 mg) by mouth at bedtime. Taking    senna-docusate (SENOKOT-S/PERICOLACE) 8.6-50 MG tablet Take 4 tablets by mouth 2 times daily. Taking    sertraline (ZOLOFT) 100 MG tablet Take 100 mg by mouth daily. 0900 Taking    sorbitol 70 % solution Take 20 mLs by mouth 2 times daily. Taking

## 2025-04-17 NOTE — PLAN OF CARE
Goal Outcome Evaluation:      Plan of Care Reviewed With: patient          Outcome Evaluation: Amticipate return to assisted living with continued hospice at discharge.

## 2025-04-18 PROCEDURE — 250N000013 HC RX MED GY IP 250 OP 250 PS 637: Performed by: INTERNAL MEDICINE

## 2025-04-18 PROCEDURE — 250N000011 HC RX IP 250 OP 636: Performed by: INTERNAL MEDICINE

## 2025-04-18 PROCEDURE — 99239 HOSP IP/OBS DSCHRG MGMT >30: CPT | Mod: GW | Performed by: INTERNAL MEDICINE

## 2025-04-18 RX ORDER — SIMETHICONE 125 MG
125 TABLET,CHEWABLE ORAL 3 TIMES DAILY PRN
DISCHARGE
Start: 2025-04-18

## 2025-04-18 RX ORDER — MORPHINE SULFATE 30 MG/1
5 TABLET ORAL
Qty: 12 TABLET | Refills: 0 | Status: SHIPPED | OUTPATIENT
Start: 2025-04-18

## 2025-04-18 RX ORDER — AZITHROMYCIN 250 MG/1
250 TABLET, FILM COATED ORAL DAILY
DISCHARGE
Start: 2025-04-18 | End: 2025-04-22

## 2025-04-18 RX ADMIN — METOPROLOL TARTRATE 12.5 MG: 25 TABLET, FILM COATED ORAL at 08:52

## 2025-04-18 RX ADMIN — NALOXEGOL OXALATE 12.5 MG: 12.5 TABLET, FILM COATED ORAL at 06:42

## 2025-04-18 RX ADMIN — PIPERACILLIN AND TAZOBACTAM 4.5 G: 4; .5 INJECTION, POWDER, FOR SOLUTION INTRAVENOUS at 09:04

## 2025-04-18 RX ADMIN — PIPERACILLIN AND TAZOBACTAM 4.5 G: 4; .5 INJECTION, POWDER, FOR SOLUTION INTRAVENOUS at 03:28

## 2025-04-18 RX ADMIN — METHENAMINE HIPPURATE 1 G: 1 TABLET ORAL at 08:52

## 2025-04-18 RX ADMIN — UMECLIDINIUM BROMIDE AND VILANTEROL TRIFENATATE 1 PUFF: 62.5; 25 POWDER RESPIRATORY (INHALATION) at 08:57

## 2025-04-18 RX ADMIN — SORBITOL SOLUTION (BULK) 30 ML: 70 SOLUTION at 08:58

## 2025-04-18 RX ADMIN — CARBOXYMETHYLCELLULOSE SODIUM 2 DROP: 5 SOLUTION/ DROPS OPHTHALMIC at 08:52

## 2025-04-18 RX ADMIN — BISACODYL 10 MG: 10 SUPPOSITORY RECTAL at 08:53

## 2025-04-18 RX ADMIN — CARBOXYMETHYLCELLULOSE SODIUM 2 DROP: 5 SOLUTION/ DROPS OPHTHALMIC at 08:57

## 2025-04-18 RX ADMIN — ALLOPURINOL 300 MG: 300 TABLET ORAL at 09:08

## 2025-04-18 RX ADMIN — PANTOPRAZOLE SODIUM 40 MG: 40 TABLET, DELAYED RELEASE ORAL at 08:52

## 2025-04-18 RX ADMIN — SERTRALINE HYDROCHLORIDE 100 MG: 100 TABLET ORAL at 08:53

## 2025-04-18 RX ADMIN — FUROSEMIDE 20 MG: 20 TABLET ORAL at 08:53

## 2025-04-18 ASSESSMENT — ACTIVITIES OF DAILY LIVING (ADL)
ADLS_ACUITY_SCORE: 72
ADLS_ACUITY_SCORE: 84
ADLS_ACUITY_SCORE: 72
ADLS_ACUITY_SCORE: 84
ADLS_ACUITY_SCORE: 72
ADLS_ACUITY_SCORE: 84
ADLS_ACUITY_SCORE: 72

## 2025-04-18 NOTE — PROGRESS NOTES
Admitting Diagnosis: Fecal impaction in rectum (H) [K56.41]  Constipation, unspecified constipation type [K59.00]  Pneumonia of both lower lobes due to infectious organism [J18.9]   Vitals VSS  Pain 9/10. Taking oxy/IV dilaudid PRN. Last dose n/a  A&Ox4  Voiding Cardona in place; chronic  Mobility Assist of 2 with a lift  CMS Left sided weakness  Lung Sounds Rhonchi bilateral lungs 2L O2 via nasal cannula   GI Active, 4 BMs today; 2 extra large ones.  Dressing. Mepi on coccyx.     Orders Placed This Encounter      Combination Diet Regular Diet; Mechanical/Dental Soft Diet; Liquidized/Moderately Thick (level 3)

## 2025-04-18 NOTE — DISCHARGE SUMMARY
Patient A&Ox 2 disoriented to Place and time. Up with lift. Incontinent of BM., Chronic Cardona in place and patent. Patient discharge via [facility]. AVS printed, No Medication send with Pt. Writer did call Pharmacy because at first Pt suppose to be sending Azithromycin and Simethicone and Printed Script of Morphine was tube to Pharmacy which was sent back due to Pharmacy saying this medications cannot be compounded here.  writer called to make sure Pt can be send with three day worth medication per SW communication to writer, Pharmacy stated Yes they will make sure medication are bubble rubs and three day worth; but at discharge time medications where not send to floor writer called to check what happened, Pharmacy told writer there is no medication order for this Pharmacy and for sure writer look there was not pharmacy list, so Pt was discharge with no Medications.  Belonging returned to Pt

## 2025-04-18 NOTE — PROGRESS NOTES
Care Management Discharge Note    Discharge Date: 04/18/2025       Discharge Disposition: Assisted Living, Hospice    Discharge Services:      Discharge DME:      Discharge Transportation: health plan transportation    Private pay costs discussed: Not applicable    Does the patient's insurance plan have a 3 day qualifying hospital stay waiver?  No    PAS Confirmation Code:    Patient/family educated on Medicare website which has current facility and service quality ratings:      Education Provided on the Discharge Plan:    Persons Notified of Discharge Plans: Patient's family, hospice, [facility], and [facility] staff  Patient/Family in Agreement with the Plan: yes    Handoff Referral Completed: No, handoff not indicated or clinically appropriate    Additional Information:  Writer was approached by Shahla, representative with [facility] who informed the writer they were following the patient still for hospice. Shahla informed the writer they spoke with both the patient and HCA, step daughter, López, who were both in agreement with the treatment plan of getting off IV abs and switching to Orals. Shahla informed the writer they were placing a detailed POLST in the patient's chart for the provider to sign. Writer stated they will communicate with the hospitalist.     Writer reached out to the hospitalist. Hospitalist stopped by and confirmed the patient would be ready to discharge today.      reached out to [facility] and spoke with Karel ([phone]). Karel stated they would need the orders and med list sent to them to confirm not much has changed. Karel stated if all is clear they would be able to take the patient anytime before 1500.     Writer requested discharge order.  received the order and sent them via [employer] to [facility].      contacted [employer] and set up a stretcher ride for today at [phone] at the earliest.  completed PCS form.      contacted Shahla with [facility] ([phone]) and  left a VM with the update on the discharge plan.     Writer reached out to the HCA, López ( [phone]) and updated on the discharge plan. López consented.     Writer reached out to the nursing staff to send down medications to pharmacy for a 3 day supply.     Writer attempted to contact Karel at [facility], but left a VM waiting to confirm they were ok to accept the patient.     Patient will discharge from [facility] to [facility] with [facility]:  Cindy at [phone].  Please refer to  progress notes for further details.No further care management intervention anticipated at this time.  Please re-consult if further needs arise.  Care management signing off.        TIFFANY Preston  [facility]  Social Work

## 2025-04-18 NOTE — PROVIDER NOTIFICATION
Added inpatient order per UR recommendations as pt is staying an additional midnight.    Deric Rivera MD  10:24 PM

## 2025-04-18 NOTE — DISCHARGE SUMMARY
[facility]  Hospitalist Discharge Summary      Date of Admission:  4/16/2025  Date of Discharge:  4/18/2025  Discharging Provider: Marcie Echeverria MD  Discharge Service: Hospitalist Service    Discharge Diagnoses   Bilateral pneumonia secondary to chronic aspiration  Chronic hypoxic respiratory failure due to advanced COPD  Opioid-induced constipation with rectal stool impaction, resolved  History of CVA with left-sided hemiplegia  Hypertension  Diet-controlled type 2 diabetes mellitus  Gout  GERD  Chronic diastolic heart failure  Depression  Hospice care patient  -Sacral spine Deep Tissue Pressure Injury, evolving, (POA)     Clinically Significant Risk Factors     # Overweight: Estimated body mass index is 28.43 kg/m  as calculated from the following:    Height as of this encounter: 1.829 m (6').    Weight as of this encounter: 95.1 kg (209 lb 10.5 oz).       Follow-ups Needed After Discharge   Follow-up Appointments       Follow Up      Follow up with [facility] team after returning to [facility].  No further labs or tests are needed.                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 He presented to the emergency department for evaluation of abdominal pain and is found to have  "large volume rectal stool burden with minimal success following disimpaction.  Also found to have bilateral pneumonia on CT somewhat similar to prior CT imaging in the setting of chronic aspiration and recurrent aspiration pneumonitis.    Bilateral pneumonia secondary to chronic aspiration: No measured fevers, but mild leukocytosis at 11.9, lactic acid elevated at 2.7 and subsequently 2.2.  Chronic respiratory failure with hypoxia: Baseline on 2 L nasal cannula oxygen.  No hypoxia increasing hypoxia from his baseline, but rattling breath sounds throughout lung fields.  Some of these are transmitted upper airway sounds with his chronic aspiration.  - apparently he was initiated on anti-infectives yesterday by his hospice team  - IV zosyn q6h; azithromycin.  Zosyn was started in the emergency department when it was thought he had undifferentiated sepsis, potentially from an abdominal source.   - Discussed with [facility] RN, Francesca.  - see SW note from 4/17:  \"Writer was approached by Shahla, representative with [facility] who informed the writer they were following the patient still for hospice. Shahla informed the writer they spoke with both the patient and HCA, step daughter, López, who were both in agreement with the treatment plan of getting off IV abs and switching to Orals. Shahla informed the writer they were placing a detailed POLST in the patient's chart for the provider to sign.\" (Signed.)    Constipation with rectal stool impaction: Has some chronic issues with constipation, undergoes manual disimpaction through his hospice team.  Was to be on sorbitol twice daily, but apparently this prescription did not go through? (by chart review).   - Manual disimpaction attempted in the emergency department with minimal effect; uncomfortable for patient  - Sorbitol 30 mL 70% solution twice daily  - Scheduled senna  - Additional as needed bowel regimen  - Enema   - Added Movantik for OIC.  Patient had excellent results, " says he feels comfortable today.      COPD with chronic hypoxic respiratory failure:  - Continue DuoNebs 4 times daily  - As needed albuterol nebulizer treatments  - continue PTA anoro ellipta as per LTC facesheet    History of CVA with left-sided hemiplegia:  - Wound consult for buttock wound  - Continue chronic Cardona catheter for bladder outlet obstruction/neurogenic bladder.    - Continue prior to admission Hippurate for hx of CAUTI    Hypertension:  - Continuing PTAmetoprolol tartrate 12.5 mg twice daily    Diet controlled type 2 diabetes: Not on insulin; currently enrolled in hospice.  - Have not ordered blood glucose checks or insulin given comfort cares    Gout:  - Continuing PTA allopurinol    GERD:  - Continue PTA Protonix    Chronic diastolic heart failure:  - Continue PTA metoprolol tartrate 12.5 mg twice daily    Depression:  - Continue prior to admission low-dose Seroquel 12.5 mg at bedtime; consider discontinuation if ongoing issues with constipation.    - Continue prior to admission Zoloft 100 mg daily    Other:   Patient is on hospice through [facility] hospice, enrolled at the end of February following hospitalization.  Hospice team was contacted by ER provider and it was felt that he did not need to unenroll from hospice at this time.  See Dr. Bailey's note.  They are aware of antibiotic treatment for pneumonia, and apparently started patient on antibiotic within the past 24 hours  -Treat for comfort.  Have not ordered additional labs or imaging studies.      Consultations This Hospital Stay   CARE MANAGEMENT / SOCIAL WORK IP CONSULT  WOUND OSTOMY CONTINENCE NURSE  IP CONSULT    Code Status   No CPR- Do NOT Intubate    Time Spent on this Encounter   I, Marcie Echeverria MD, personally saw the patient today and spent greater than 30 minutes discharging this patient.       Marcie Echeverria MD  [facility]  [address] [address]  Phone:  [phone]  Fax: [phone]  ______________________________________________________________________    Physical Exam   Vital Signs:     BP: 111/53 Pulse: 82     SpO2: 98 % O2 Device: Nasal cannula Oxygen Delivery: 2 LPM  Weight: 209 lbs 10.52 oz  I saw and examined the patient on the date of discharge.         Primary Care Physician   [facility]    Discharge Orders      Reason for your hospital stay    You were very constipated, mostly due to medications used to treat pain and shortness of breath.     Activity    Your activity upon discharge: activity as tolerated     Tubes and Drains    Current Tubes and Drains:     Drain  Duration           Urinary Drain 04/17/25 Urethral Catheter 1 day         To straight gravity drainage. Change catheter every 2 weeks and PRN for leaking or decreased urine output with signs of bladder distention. DO NOT change catheter without a specific Provider order IF diagnosis of benign prostatic hypertrophy (BPH), neurogenic bladder, or other urological conditions      Follow Up    Follow up with [facility] hospice team after returning to [facility].  No further labs or tests are needed.     Oxygen Adult/Peds    Oxygen Documentation  I certify that this patient, Ron Gilmore has been under my care (or a nurse practitioner or physican's assistant working with me). This is the face-to-face encounter for oxygen medical necessity.      At the time of this encounter, I have reviewed the qualifying testing and have determined that supplemental oxygen is reasonable and necessary and is expected to improve the patient's condition in a home setting.         Patient has continued oxygen desaturation due to COPD J44.9.    If portability is ordered, is the patient mobile within the home? no    Was this visit performed as a telehealth visit: No     Diet    Follow this diet upon discharge: Current Diet:Orders Placed This Encounter      Combination Diet Regular Diet;  Mechanical/Dental Soft Diet; Liquidized/Moderately Thick (level 3)       Significant Results and Procedures   Most Recent 3 CBC's:  Recent Labs   Lab Test 04/16/25 1842 02/01/25  1410 01/30/25  0853 01/29/25  0950   WBC 11.9*  --  10.0 16.7*   HGB 10.2*  --  10.0* 11.3*   MCV 82  --  86 83    211 170 199     Most Recent 3 BMP's:  Recent Labs   Lab Test 04/16/25  1842 02/03/25  0801 02/02/25  1416 02/01/25  1410 01/31/25  0830 01/30/25  1222 01/30/25  0855 01/30/25  0853 01/29/25  1713 01/29/25  0950     --   --   --   --   --   --  137  --  136   POTASSIUM 3.8 4.2 4.3   < > 4.7  --   --  5.1  --  4.7   CHLORIDE 95*  --   --   --   --   --   --  102  --  99   CO2 32*  --   --   --   --   --   --  30*  --  29   BUN 20.1  --   --   --   --   --   --  17.1  --  18.8   CR 0.82 0.86  --   --  0.94  --   --  0.85  --  0.86   ANIONGAP 9  --   --   --   --   --   --  5*  --  8   RAJ 8.8  --   --   --   --   --   --  8.6*  --  9.0   *  --   --   --   --  143* 105* 116*   < > 178*    < > = values in this interval not displayed.     7-Day Micro Results       Collected Updated Procedure Result Status      04/16/2025 2307 04/17/2025 0029 Influenza A/B, RSV and SARS-CoV2 PCR (COVID-19) Nasopharyngeal [[id number]]    Swab from Nasopharyngeal    Final result Component Value   Influenza A PCR Negative   Influenza B PCR Negative   RSV PCR Negative   SARS CoV2 PCR Negative   NEGATIVE: SARS-CoV-2 (COVID-19) RNA not detected, presumed negative.            04/16/2025 2128 04/18/2025 0031 Blood Culture Line, venous [[id number]]   Blood from Line, venous    Preliminary result Component Value   Culture No growth after 1 day  [P]                04/16/2025 1842 04/18/2025 0031 Blood Culture Peripheral Blood [[id number]]   Peripheral Blood    Preliminary result Component Value   Culture No growth after 1 day  [P]                    ,   Results for orders placed or performed during the hospital encounter of 04/16/25    CT Chest/Abdomen/Pelvis w Contrast    Narrative    EXAM: CT CHEST/ABDOMEN/PELVIS W CONTRAST  LOCATION: [facility]  DATE: 4/16/2025    INDICATION: Abdominal pain, no BM X 1 week and productive cough  check for pneumonia, SBO, Diverticulitis, bowel perforation, pancreatitis, biliary obstruction  COMPARISON: Chest CT December 26, 2024. CT chest, abdomen, pelvis October 11, 2024.  TECHNIQUE: CT scan of the chest, abdomen, and pelvis was performed following injection of IV contrast. Multiplanar reformats were obtained. Dose reduction techniques were used.   CONTRAST: 106 mL Isovue 370    FINDINGS:   LUNGS AND PLEURA: Emphysematous change with new parenchymal opacification in the lower lobes associated with air bronchograms. There is less extensive opacity along the dependent upper lobes and lingula. Secretions along the right bronchus intermedius.   No pleural effusion.    MEDIASTINUM/AXILLAE: Unchanged mediastinal lymphadenopathy. Right paratracheal node on image 3:44 measures 14 mm short axis. Normal thoracic esophagus.    CORONARY ARTERY CALCIFICATION: Severe.    HEPATOBILIARY: Cholecystectomy. Normal hepatic parenchyma and biliary tree.    PANCREAS: Normal.    SPLEEN: Normal.    ADRENAL GLANDS: Normal.    KIDNEYS/BLADDER: 10 mm left upper pole calyceal calculus. Otherwise normal appearance of the kidneys and ureters. The urinary bladder is decompressed by a Cardona catheter.    BOWEL: The rectum and distal sigmoid colon are distended with a large volume of fecal material to 9 x 10 cm diameter over length of about 23 cm. No bowel wall thickening. The stomach and small bowel appear normal. The appendix is not identified. There is   no inflammation around the cecum.    LYMPH NODES: Normal.    VASCULATURE: Atheromatous vascular calcification with normal caliber abdominal aorta and normal enhancement of the mesenteric vessels. Inferior vena cava filter within the normally enhancing IVC.    PELVIC  ORGANS: Normal.    MUSCULOSKELETAL: Degenerative change at the left hip and lumbar spine. Old right humerus fracture. Rotator cuff muscle atrophy bilaterally suggesting there chronic bilateral full-thickness cuff tears.      Impression    IMPRESSION:  1.  New bilateral lower lobe pneumonia.  2.  Large volume of fecal material distending the rectum and distal sigmoid colon. No evidence of bowel obstruction or perforation.  3.  Chronic changes as discussed above.         Discharge Medications   Current Discharge Medication List        START taking these medications    Details   azithromycin (ZITHROMAX) 250 MG tablet Take 1 tablet (250 mg) by mouth daily for 4 days.    Associated Diagnoses: Aspiration pneumonia of right lower lobe, unspecified aspiration pneumonia type (H)           CONTINUE these medications which have CHANGED    Details   morphine 5 MG solu-tab Place 1 tablet (5 mg) under the tongue every hour as needed for shortness of breath or moderate to severe pain.  Qty: 12 tablet, Refills: 0    Associated Diagnoses: Hospice care patient      simethicone (MYLICON) 125 MG chewable tablet Take 1 tablet (125 mg) by mouth 3 times daily as needed for intestinal gas.    Associated Diagnoses: Hospice care patient           CONTINUE these medications which have NOT CHANGED    Details   acetaminophen (TYLENOL) 325 MG tablet Take 650 mg by mouth every 4 hours as needed for mild pain as needed for pain/fever Max dose 3000mg/24hr      albuterol (PROVENTIL) (2.5 MG/3ML) 0.083% neb solution Inhale 2.5 mg into the lungs every 4 hours as needed.      allopurinol (ZYLOPRIM) 300 MG tablet Take 300 mg by mouth every morning 0900      bisacodyl (DULCOLAX) 10 MG suppository Place 10 mg rectally daily as needed for constipation. Mon/Wed/Fri      bisacodyl (DULCOLAX) 5 MG EC tablet Take 10 mg by mouth daily.      dextromethorphan (DELSYM) 30 MG/5ML liquid Take 60 mg by mouth every 4 hours as needed for cough.      furosemide (LASIX)  20 MG tablet Take 20 mg by mouth daily 0900  Qty: 45 tablet, Refills: 0    Associated Diagnoses: Acute diastolic congestive heart failure (H)      hyoscyamine 0.125 MG TBDP Take 0.125 mg by mouth every 4 hours as needed for cramping.      !! hypromellose (ARTIFICIAL TEARS) 0.5 % SOLN ophthalmic solution Place 2 drops into both eyes 2 times daily 0800, 2000      !! hypromellose (ARTIFICIAL TEARS) 0.5 % SOLN ophthalmic solution Place 2 drops into both eyes 2 times daily as needed for dry eyes      ipratropium - albuterol 0.5 mg/2.5 mg/3 mL (DUONEB) 0.5-2.5 (3) MG/3ML neb solution Take 1 vial by nebulization 3 times daily as needed for shortness of breath, wheezing or cough.      ipratropium-albuterol (COMBIVENT RESPIMAT)  MCG/ACT inhaler Inhale 1 puff into the lungs 4 times daily 0900, 1200 ,1600 ,2000      melatonin 3 MG tablet Take 3 mg by mouth at bedtime. May repeat dose if not effective      methenamine hippurate (HIPREX) 1 g tablet Take 1 g by mouth 2 times daily 0900, 2000      metoprolol tartrate (LOPRESSOR) 25 MG tablet Take 0.5 tablets (12.5 mg) by mouth 2 times daily    Associated Diagnoses: Cerebrovascular accident (CVA), unspecified mechanism (H)      pantoprazole (PROTONIX) 40 MG EC tablet Take 40 mg by mouth 2 times daily 0900, 2000      !! QUEtiapine (SEROQUEL) 25 MG tablet Take 12.5 mg by mouth every 4 hours as needed.      !! QUEtiapine (SEROQUEL) 25 MG tablet Take 0.5 tablets (12.5 mg) by mouth at bedtime.  Qty: 30 tablet, Refills: 0    Associated Diagnoses: Mild episode of recurrent major depressive disorder      senna-docusate (SENOKOT-S/PERICOLACE) 8.6-50 MG tablet Take 4 tablets by mouth 2 times daily.      sertraline (ZOLOFT) 100 MG tablet Take 100 mg by mouth daily. 0900      sorbitol 70 % solution Take 20 mLs by mouth 2 times daily.       !! - Potential duplicate medications found. Please discuss with provider.        Allergies   No Known Allergies

## 2025-04-19 ENCOUNTER — PATIENT OUTREACH (OUTPATIENT)
Dept: CARE COORDINATION | Facility: CLINIC | Age: 85
End: 2025-04-19
Payer: MEDICARE

## 2025-04-19 NOTE — PROGRESS NOTES
[facility]    Background: Transitional Care Management program identified per system criteria and reviewed by [facility] team for possible outreach.    Assessment: Upon chart review, [facility] Team member will not proceed with patient outreach related to this episode of Transitional Care Management program due to reason below:    Patient has been discharged with Hospice Care    Discharged to [facility] assisted living, with [facility] hospice     Plan: Transitional Care Management episode addressed appropriately per reason noted above.          BRANDY Keys  [phone]  [facility]

## 2025-04-22 ENCOUNTER — DOCUMENTATION ONLY (OUTPATIENT)
Dept: OTHER | Facility: CLINIC | Age: 85
End: 2025-04-22
Payer: MEDICARE

## 2025-04-22 LAB
BACTERIA BLD CULT: NO GROWTH
BACTERIA BLD CULT: NO GROWTH

## 2025-05-07 ENCOUNTER — DOCUMENTATION ONLY (OUTPATIENT)
Dept: OTHER | Facility: CLINIC | Age: 85
End: 2025-05-07
Payer: OTHER MISCELLANEOUS

## 2025-07-05 ENCOUNTER — HEALTH MAINTENANCE LETTER (OUTPATIENT)
Age: 85
End: 2025-07-05

## 2025-08-16 ENCOUNTER — HEALTH MAINTENANCE LETTER (OUTPATIENT)
Age: 85
End: 2025-08-16

## (undated) DEVICE — GLOVE PROTEXIS W/NEU-THERA 8.0  2D73TE80

## (undated) DEVICE — SOL WATER IRRIG 3000ML BAG 2B7117

## (undated) DEVICE — SOL WATER IRRIG 1000ML BOTTLE 2F7114

## (undated) DEVICE — SOL NACL 0.9% IRRIG 3000ML BAG 07972-08

## (undated) DEVICE — BAG CYSTO TABLE DRAIN

## (undated) DEVICE — BAG URINARY DRAIN 4000ML LF 153509

## (undated) DEVICE — SOL NACL 0.9% IRRIG 1000ML BOTTLE 2F7124

## (undated) DEVICE — RAD RX ISOVUE 300 (50ML) 61% IOPAMIDOL CHARGE PER ML

## (undated) DEVICE — DECANTER BAG 2002S

## (undated) DEVICE — PACK TUR CUSTOM SBA15RUFSE

## (undated) DEVICE — PACK CYSTOSCOPY SBA15CYFSI

## (undated) DEVICE — Device

## (undated) DEVICE — PAD CHUX UNDERPAD 23X24" 7136

## (undated) DEVICE — DRAPE SLEEVE MEDT STERILE 10FT 6177

## (undated) DEVICE — CATH FOLEY 18FR 5ML LATEX 0165SI18

## (undated) DEVICE — LINEN TOWEL PACK X5 5464

## (undated) DEVICE — LASER FIBER HOLMIUM FLEXIVA 200UM M0068403910 840-391

## (undated) DEVICE — CATH FOLEY 3WAY 22FR 30ML LATEX 0167SI22

## (undated) DEVICE — CATH URETERAL OPEN END 6FR AXXCESS

## (undated) DEVICE — FLEXOR URETERAL ACCESS SHEATH

## (undated) DEVICE — ESU GROUND PAD UNIVERSAL W/O CORD

## (undated) DEVICE — GUIDEWIRE SENSOR DUAL FLEX STR 0.035"X150CM M0066703080

## (undated) DEVICE — TUBING IRRIG TUR Y TYPE 96" LF 6543-01

## (undated) RX ORDER — FENTANYL CITRATE 50 UG/ML
INJECTION, SOLUTION INTRAMUSCULAR; INTRAVENOUS
Status: DISPENSED
Start: 2021-05-29

## (undated) RX ORDER — FENTANYL CITRATE 50 UG/ML
INJECTION, SOLUTION INTRAMUSCULAR; INTRAVENOUS
Status: DISPENSED
Start: 2021-05-24

## (undated) RX ORDER — LIDOCAINE HYDROCHLORIDE 20 MG/ML
JELLY TOPICAL
Status: DISPENSED
Start: 2021-03-15

## (undated) RX ORDER — FENTANYL CITRATE 50 UG/ML
INJECTION, SOLUTION INTRAMUSCULAR; INTRAVENOUS
Status: DISPENSED
Start: 2021-04-14

## (undated) RX ORDER — LIDOCAINE HYDROCHLORIDE 10 MG/ML
INJECTION, SOLUTION INFILTRATION; PERINEURAL
Status: DISPENSED
Start: 2021-03-16

## (undated) RX ORDER — LIDOCAINE HYDROCHLORIDE 10 MG/ML
INJECTION, SOLUTION EPIDURAL; INFILTRATION; INTRACAUDAL; PERINEURAL
Status: DISPENSED
Start: 2020-01-20

## (undated) RX ORDER — CEFAZOLIN SODIUM 1 G/50ML
SOLUTION INTRAVENOUS
Status: DISPENSED
Start: 2018-02-21

## (undated) RX ORDER — LIDOCAINE HYDROCHLORIDE 10 MG/ML
INJECTION, SOLUTION INFILTRATION; PERINEURAL
Status: DISPENSED
Start: 2021-03-09

## (undated) RX ORDER — ACETAMINOPHEN 325 MG/1
TABLET ORAL
Status: DISPENSED
Start: 2021-04-14

## (undated) RX ORDER — NEOSTIGMINE METHYLSULFATE 1 MG/ML
VIAL (ML) INJECTION
Status: DISPENSED
Start: 2018-02-21

## (undated) RX ORDER — HEPARIN SODIUM 200 [USP'U]/100ML
INJECTION, SOLUTION INTRAVENOUS
Status: DISPENSED
Start: 2021-05-24

## (undated) RX ORDER — FENTANYL CITRATE 50 UG/ML
INJECTION, SOLUTION INTRAMUSCULAR; INTRAVENOUS
Status: DISPENSED
Start: 2018-02-21

## (undated) RX ORDER — FENTANYL CITRATE 50 UG/ML
INJECTION, SOLUTION INTRAMUSCULAR; INTRAVENOUS
Status: DISPENSED
Start: 2021-05-28

## (undated) RX ORDER — FENTANYL CITRATE 50 UG/ML
INJECTION, SOLUTION INTRAMUSCULAR; INTRAVENOUS
Status: DISPENSED
Start: 2022-09-24

## (undated) RX ORDER — FENTANYL CITRATE 50 UG/ML
INJECTION, SOLUTION INTRAMUSCULAR; INTRAVENOUS
Status: DISPENSED
Start: 2021-03-16

## (undated) RX ORDER — FENTANYL CITRATE 50 UG/ML
INJECTION, SOLUTION INTRAMUSCULAR; INTRAVENOUS
Status: DISPENSED
Start: 2021-03-09

## (undated) RX ORDER — LIDOCAINE HYDROCHLORIDE 10 MG/ML
INJECTION, SOLUTION INFILTRATION; PERINEURAL
Status: DISPENSED
Start: 2021-05-24